# Patient Record
Sex: FEMALE | Race: WHITE | NOT HISPANIC OR LATINO | Employment: OTHER | URBAN - METROPOLITAN AREA
[De-identification: names, ages, dates, MRNs, and addresses within clinical notes are randomized per-mention and may not be internally consistent; named-entity substitution may affect disease eponyms.]

---

## 2020-10-16 ENCOUNTER — OFFICE VISIT (OUTPATIENT)
Dept: URGENT CARE | Facility: CLINIC | Age: 50
End: 2020-10-16
Payer: MEDICARE

## 2020-10-16 VITALS
RESPIRATION RATE: 20 BRPM | SYSTOLIC BLOOD PRESSURE: 138 MMHG | HEART RATE: 99 BPM | TEMPERATURE: 97 F | DIASTOLIC BLOOD PRESSURE: 90 MMHG | OXYGEN SATURATION: 97 %

## 2020-10-16 DIAGNOSIS — M54.50 CHRONIC BILATERAL LOW BACK PAIN WITHOUT SCIATICA: ICD-10-CM

## 2020-10-16 DIAGNOSIS — G89.29 CHRONIC BILATERAL LOW BACK PAIN WITHOUT SCIATICA: ICD-10-CM

## 2020-10-16 DIAGNOSIS — R30.0 DYSURIA: Primary | ICD-10-CM

## 2020-10-16 LAB
SL AMB  POCT GLUCOSE, UA: ABNORMAL
SL AMB LEUKOCYTE ESTERASE,UA: ABNORMAL
SL AMB POCT BILIRUBIN,UA: ABNORMAL
SL AMB POCT BLOOD,UA: ABNORMAL
SL AMB POCT CLARITY,UA: CLEAR
SL AMB POCT COLOR,UA: YELLOW
SL AMB POCT KETONES,UA: ABNORMAL
SL AMB POCT NITRITE,UA: ABNORMAL
SL AMB POCT PH,UA: 5
SL AMB POCT SPECIFIC GRAVITY,UA: 1.01
SL AMB POCT URINE PROTEIN: ABNORMAL
SL AMB POCT UROBILINOGEN: 1

## 2020-10-16 PROCEDURE — 87086 URINE CULTURE/COLONY COUNT: CPT | Performed by: PHYSICIAN ASSISTANT

## 2020-10-16 PROCEDURE — 99203 OFFICE O/P NEW LOW 30 MIN: CPT | Performed by: PHYSICIAN ASSISTANT

## 2020-10-16 PROCEDURE — G0463 HOSPITAL OUTPT CLINIC VISIT: HCPCS | Performed by: PHYSICIAN ASSISTANT

## 2020-10-16 PROCEDURE — 81002 URINALYSIS NONAUTO W/O SCOPE: CPT | Performed by: PHYSICIAN ASSISTANT

## 2020-10-16 RX ORDER — HALOPERIDOL 10 MG/1
10 TABLET ORAL 2 TIMES DAILY
COMMUNITY
End: 2021-01-18 | Stop reason: HOSPADM

## 2020-10-16 RX ORDER — OLANZAPINE 5 MG/1
20 TABLET ORAL
COMMUNITY
End: 2021-01-18 | Stop reason: HOSPADM

## 2020-10-16 RX ORDER — ACETAMINOPHEN 325 MG/1
650 TABLET ORAL EVERY 6 HOURS PRN
Qty: 30 TABLET | Refills: 0 | Status: SHIPPED | OUTPATIENT
Start: 2020-10-16 | End: 2021-11-23 | Stop reason: HOSPADM

## 2020-10-16 RX ORDER — ASPIRIN 81 MG/1
81 TABLET ORAL DAILY
Status: ON HOLD | COMMUNITY
End: 2021-01-18 | Stop reason: SDUPTHER

## 2020-10-16 RX ORDER — DIVALPROEX SODIUM 500 MG/1
500 TABLET, DELAYED RELEASE ORAL EVERY 12 HOURS SCHEDULED
Status: ON HOLD | COMMUNITY
End: 2021-01-18 | Stop reason: SDUPTHER

## 2020-10-16 RX ORDER — LISINOPRIL 20 MG/1
20 TABLET ORAL DAILY
COMMUNITY
End: 2021-01-18 | Stop reason: HOSPADM

## 2020-10-16 RX ORDER — OMEPRAZOLE 20 MG/1
20 CAPSULE, DELAYED RELEASE ORAL DAILY
COMMUNITY
End: 2021-01-07

## 2020-10-16 RX ORDER — FLUOXETINE HYDROCHLORIDE 40 MG/1
40 CAPSULE ORAL DAILY
COMMUNITY
End: 2021-01-18 | Stop reason: HOSPADM

## 2020-10-16 RX ORDER — NITROFURANTOIN 25; 75 MG/1; MG/1
100 CAPSULE ORAL 2 TIMES DAILY
Qty: 10 CAPSULE | Refills: 0 | Status: SHIPPED | OUTPATIENT
Start: 2020-10-16 | End: 2020-10-21

## 2020-10-16 RX ORDER — PALIPERIDONE PALMITATE 156 MG/ML
156 INJECTION INTRAMUSCULAR
COMMUNITY
End: 2021-01-07

## 2020-10-16 RX ORDER — ATORVASTATIN CALCIUM 40 MG/1
40 TABLET, FILM COATED ORAL DAILY
COMMUNITY
End: 2021-10-15 | Stop reason: HOSPADM

## 2020-10-17 LAB — BACTERIA UR CULT: NORMAL

## 2020-10-25 ENCOUNTER — HOSPITAL ENCOUNTER (EMERGENCY)
Facility: HOSPITAL | Age: 50
End: 2020-10-27
Attending: EMERGENCY MEDICINE | Admitting: EMERGENCY MEDICINE
Payer: MEDICARE

## 2020-10-25 DIAGNOSIS — R45.851 SUICIDAL IDEATION: Primary | ICD-10-CM

## 2020-10-25 LAB
AMPHETAMINES SERPL QL SCN: NEGATIVE
BARBITURATES UR QL: NEGATIVE
BENZODIAZ UR QL: NEGATIVE
COCAINE UR QL: NEGATIVE
ETHANOL EXG-MCNC: 0 MG/DL
ETHANOL EXG-MCNC: 0 MG/DL
EXT PREG TEST URINE: NEGATIVE
EXT. CONTROL ED NAV: NORMAL
METHADONE UR QL: NEGATIVE
OPIATES UR QL SCN: NEGATIVE
OXYCODONE+OXYMORPHONE UR QL SCN: NEGATIVE
PCP UR QL: NEGATIVE
SARS-COV-2 RNA RESP QL NAA+PROBE: NEGATIVE
THC UR QL: NEGATIVE

## 2020-10-25 PROCEDURE — 99284 EMERGENCY DEPT VISIT MOD MDM: CPT | Performed by: EMERGENCY MEDICINE

## 2020-10-25 PROCEDURE — 99285 EMERGENCY DEPT VISIT HI MDM: CPT

## 2020-10-25 PROCEDURE — 80307 DRUG TEST PRSMV CHEM ANLYZR: CPT | Performed by: EMERGENCY MEDICINE

## 2020-10-25 PROCEDURE — 82075 ASSAY OF BREATH ETHANOL: CPT

## 2020-10-25 PROCEDURE — 81025 URINE PREGNANCY TEST: CPT | Performed by: EMERGENCY MEDICINE

## 2020-10-25 PROCEDURE — 82075 ASSAY OF BREATH ETHANOL: CPT | Performed by: EMERGENCY MEDICINE

## 2020-10-25 PROCEDURE — 87635 SARS-COV-2 COVID-19 AMP PRB: CPT | Performed by: EMERGENCY MEDICINE

## 2020-10-26 PROCEDURE — 99204 OFFICE O/P NEW MOD 45 MIN: CPT | Performed by: PHYSICIAN ASSISTANT

## 2020-10-26 RX ORDER — ATORVASTATIN CALCIUM 40 MG/1
40 TABLET, FILM COATED ORAL
Status: DISCONTINUED | OUTPATIENT
Start: 2020-10-26 | End: 2020-10-27 | Stop reason: HOSPADM

## 2020-10-26 RX ORDER — ASPIRIN 81 MG/1
81 TABLET ORAL DAILY
Status: DISCONTINUED | OUTPATIENT
Start: 2020-10-26 | End: 2020-10-27 | Stop reason: HOSPADM

## 2020-10-26 RX ORDER — LISINOPRIL 5 MG/1
20 TABLET ORAL DAILY
Status: DISCONTINUED | OUTPATIENT
Start: 2020-10-26 | End: 2020-10-27 | Stop reason: HOSPADM

## 2020-10-26 RX ORDER — FLUOXETINE 10 MG/1
10 CAPSULE ORAL EVERY MORNING
Status: DISCONTINUED | OUTPATIENT
Start: 2020-10-26 | End: 2020-10-27 | Stop reason: HOSPADM

## 2020-10-26 RX ORDER — OLANZAPINE 5 MG/1
5 TABLET ORAL
Status: DISCONTINUED | OUTPATIENT
Start: 2020-10-26 | End: 2020-10-27 | Stop reason: HOSPADM

## 2020-10-26 RX ORDER — DIVALPROEX SODIUM 250 MG/1
500 TABLET, DELAYED RELEASE ORAL EVERY 12 HOURS SCHEDULED
Status: DISCONTINUED | OUTPATIENT
Start: 2020-10-26 | End: 2020-10-27 | Stop reason: HOSPADM

## 2020-10-26 RX ORDER — LORAZEPAM 1 MG/1
1 TABLET ORAL ONCE
Status: COMPLETED | OUTPATIENT
Start: 2020-10-26 | End: 2020-10-26

## 2020-10-26 RX ORDER — FLUOXETINE 10 MG/1
20 CAPSULE ORAL
Status: DISCONTINUED | OUTPATIENT
Start: 2020-10-26 | End: 2020-10-27 | Stop reason: HOSPADM

## 2020-10-26 RX ORDER — PANTOPRAZOLE SODIUM 20 MG/1
20 TABLET, DELAYED RELEASE ORAL
Status: DISCONTINUED | OUTPATIENT
Start: 2020-10-26 | End: 2020-10-27 | Stop reason: HOSPADM

## 2020-10-26 RX ORDER — HALOPERIDOL 10 MG/1
10 TABLET ORAL 2 TIMES DAILY
Status: DISCONTINUED | OUTPATIENT
Start: 2020-10-26 | End: 2020-10-27 | Stop reason: HOSPADM

## 2020-10-26 RX ADMIN — LISINOPRIL 20 MG: 5 TABLET ORAL at 09:05

## 2020-10-26 RX ADMIN — ATORVASTATIN CALCIUM 40 MG: 40 TABLET, FILM COATED ORAL at 17:38

## 2020-10-26 RX ADMIN — PANTOPRAZOLE SODIUM 20 MG: 20 TABLET, DELAYED RELEASE ORAL at 05:18

## 2020-10-26 RX ADMIN — OLANZAPINE 5 MG: 2.5 TABLET, FILM COATED ORAL at 02:05

## 2020-10-26 RX ADMIN — FLUOXETINE 20 MG: 10 CAPSULE ORAL at 17:37

## 2020-10-26 RX ADMIN — HALOPERIDOL 10 MG: 10 TABLET ORAL at 09:05

## 2020-10-26 RX ADMIN — ASPIRIN 81 MG: 81 TABLET, COATED ORAL at 09:05

## 2020-10-26 RX ADMIN — DIVALPROEX SODIUM 500 MG: 250 TABLET, DELAYED RELEASE ORAL at 02:06

## 2020-10-26 RX ADMIN — FLUOXETINE 10 MG: 10 CAPSULE ORAL at 09:05

## 2020-10-26 RX ADMIN — LORAZEPAM 1 MG: 1 TABLET ORAL at 14:04

## 2020-10-26 RX ADMIN — HALOPERIDOL 10 MG: 10 TABLET ORAL at 17:38

## 2020-10-26 RX ADMIN — DIVALPROEX SODIUM 500 MG: 250 TABLET, DELAYED RELEASE ORAL at 13:40

## 2020-10-27 VITALS
SYSTOLIC BLOOD PRESSURE: 129 MMHG | WEIGHT: 275 LBS | OXYGEN SATURATION: 99 % | HEIGHT: 64 IN | TEMPERATURE: 97.6 F | DIASTOLIC BLOOD PRESSURE: 70 MMHG | RESPIRATION RATE: 18 BRPM | BODY MASS INDEX: 46.95 KG/M2 | HEART RATE: 86 BPM

## 2020-10-27 RX ADMIN — ASPIRIN 81 MG: 81 TABLET, COATED ORAL at 08:46

## 2020-10-27 RX ADMIN — DIVALPROEX SODIUM 500 MG: 250 TABLET, DELAYED RELEASE ORAL at 02:16

## 2020-10-27 RX ADMIN — HALOPERIDOL 10 MG: 10 TABLET ORAL at 09:00

## 2020-10-27 RX ADMIN — OLANZAPINE 5 MG: 2.5 TABLET, FILM COATED ORAL at 00:53

## 2020-10-27 RX ADMIN — PANTOPRAZOLE SODIUM 20 MG: 20 TABLET, DELAYED RELEASE ORAL at 08:03

## 2020-10-27 RX ADMIN — LISINOPRIL 20 MG: 5 TABLET ORAL at 08:46

## 2020-10-27 RX ADMIN — FLUOXETINE 10 MG: 10 CAPSULE ORAL at 08:46

## 2020-11-28 ENCOUNTER — HOSPITAL ENCOUNTER (EMERGENCY)
Facility: HOSPITAL | Age: 50
End: 2020-11-29
Attending: EMERGENCY MEDICINE
Payer: MEDICARE

## 2020-11-28 DIAGNOSIS — R45.851 SUICIDAL IDEATIONS: ICD-10-CM

## 2020-11-28 DIAGNOSIS — F32.A DEPRESSION: Primary | ICD-10-CM

## 2020-11-28 LAB
AMPHETAMINES SERPL QL SCN: NEGATIVE
BARBITURATES UR QL: NEGATIVE
BENZODIAZ UR QL: NEGATIVE
COCAINE UR QL: NEGATIVE
ETHANOL EXG-MCNC: 0 MG/DL
EXT PREG TEST URINE: NEGATIVE
EXT. CONTROL ED NAV: NORMAL
METHADONE UR QL: NEGATIVE
OPIATES UR QL SCN: NEGATIVE
OXYCODONE+OXYMORPHONE UR QL SCN: NEGATIVE
PCP UR QL: NEGATIVE
SARS-COV-2 RNA SPEC QL NAA+PROBE: NEGATIVE
THC UR QL: NEGATIVE

## 2020-11-28 PROCEDURE — 99285 EMERGENCY DEPT VISIT HI MDM: CPT | Performed by: EMERGENCY MEDICINE

## 2020-11-28 PROCEDURE — 80307 DRUG TEST PRSMV CHEM ANLYZR: CPT | Performed by: EMERGENCY MEDICINE

## 2020-11-28 PROCEDURE — 99285 EMERGENCY DEPT VISIT HI MDM: CPT

## 2020-11-28 PROCEDURE — 81025 URINE PREGNANCY TEST: CPT | Performed by: EMERGENCY MEDICINE

## 2020-11-28 PROCEDURE — 82075 ASSAY OF BREATH ETHANOL: CPT | Performed by: EMERGENCY MEDICINE

## 2020-11-28 PROCEDURE — U0003 INFECTIOUS AGENT DETECTION BY NUCLEIC ACID (DNA OR RNA); SEVERE ACUTE RESPIRATORY SYNDROME CORONAVIRUS 2 (SARS-COV-2) (CORONAVIRUS DISEASE [COVID-19]), AMPLIFIED PROBE TECHNIQUE, MAKING USE OF HIGH THROUGHPUT TECHNOLOGIES AS DESCRIBED BY CMS-2020-01-R: HCPCS | Performed by: EMERGENCY MEDICINE

## 2020-11-28 RX ORDER — FLUOXETINE 10 MG/1
10 CAPSULE ORAL
Status: DISCONTINUED | OUTPATIENT
Start: 2020-11-29 | End: 2020-11-29 | Stop reason: HOSPADM

## 2020-11-28 RX ORDER — PANTOPRAZOLE SODIUM 20 MG/1
20 TABLET, DELAYED RELEASE ORAL DAILY
Status: DISCONTINUED | OUTPATIENT
Start: 2020-11-29 | End: 2020-11-29 | Stop reason: HOSPADM

## 2020-11-28 RX ORDER — FLUOXETINE 10 MG/1
20 CAPSULE ORAL
Status: DISCONTINUED | OUTPATIENT
Start: 2020-11-28 | End: 2020-11-29 | Stop reason: HOSPADM

## 2020-11-28 RX ORDER — ATORVASTATIN CALCIUM 40 MG/1
40 TABLET, FILM COATED ORAL
Status: DISCONTINUED | OUTPATIENT
Start: 2020-11-28 | End: 2020-11-29 | Stop reason: HOSPADM

## 2020-11-28 RX ORDER — OLANZAPINE 2.5 MG/1
5 TABLET ORAL
Status: DISCONTINUED | OUTPATIENT
Start: 2020-11-28 | End: 2020-11-29 | Stop reason: HOSPADM

## 2020-11-28 RX ORDER — HALOPERIDOL 10 MG/1
10 TABLET ORAL 2 TIMES DAILY
Status: DISCONTINUED | OUTPATIENT
Start: 2020-11-28 | End: 2020-11-29 | Stop reason: HOSPADM

## 2020-11-28 RX ORDER — VALPROIC ACID 250 MG/1
500 CAPSULE, LIQUID FILLED ORAL 2 TIMES DAILY
Status: DISCONTINUED | OUTPATIENT
Start: 2020-11-28 | End: 2020-11-29 | Stop reason: HOSPADM

## 2020-11-28 RX ORDER — ASPIRIN 81 MG/1
81 TABLET, CHEWABLE ORAL DAILY
Status: DISCONTINUED | OUTPATIENT
Start: 2020-11-29 | End: 2020-11-29 | Stop reason: HOSPADM

## 2020-11-28 RX ORDER — LISINOPRIL 5 MG/1
20 TABLET ORAL DAILY
Status: DISCONTINUED | OUTPATIENT
Start: 2020-11-29 | End: 2020-11-29 | Stop reason: HOSPADM

## 2020-11-28 RX ADMIN — HALOPERIDOL 10 MG: 10 TABLET ORAL at 18:47

## 2020-11-28 RX ADMIN — OLANZAPINE 5 MG: 2.5 TABLET, FILM COATED ORAL at 22:17

## 2020-11-28 RX ADMIN — FLUOXETINE 20 MG: 10 CAPSULE ORAL at 22:17

## 2020-11-28 RX ADMIN — VALPROIC ACID 500 MG: 250 CAPSULE, LIQUID FILLED ORAL at 22:17

## 2020-11-28 RX ADMIN — ATORVASTATIN CALCIUM 40 MG: 40 TABLET, FILM COATED ORAL at 18:47

## 2020-11-29 VITALS
HEART RATE: 71 BPM | OXYGEN SATURATION: 97 % | TEMPERATURE: 98.1 F | SYSTOLIC BLOOD PRESSURE: 120 MMHG | DIASTOLIC BLOOD PRESSURE: 63 MMHG | BODY MASS INDEX: 46.95 KG/M2 | RESPIRATION RATE: 17 BRPM | HEIGHT: 64 IN | WEIGHT: 275 LBS

## 2021-01-06 ENCOUNTER — HOSPITAL ENCOUNTER (EMERGENCY)
Facility: HOSPITAL | Age: 51
DRG: 885 | End: 2021-01-08
Attending: EMERGENCY MEDICINE | Admitting: EMERGENCY MEDICINE
Payer: MEDICARE

## 2021-01-06 DIAGNOSIS — F39 MOOD DISORDER (HCC): Primary | ICD-10-CM

## 2021-01-06 DIAGNOSIS — R45.851 SUICIDAL IDEATION: ICD-10-CM

## 2021-01-06 LAB
ETHANOL EXG-MCNC: 0 MG/DL
EXT PREG TEST URINE: NEGATIVE
EXT. CONTROL ED NAV: NORMAL
FLUAV RNA RESP QL NAA+PROBE: NEGATIVE
FLUBV RNA RESP QL NAA+PROBE: NEGATIVE
RSV RNA RESP QL NAA+PROBE: NEGATIVE
SARS-COV-2 RNA RESP QL NAA+PROBE: NEGATIVE

## 2021-01-06 PROCEDURE — 0241U HB NFCT DS VIR RESP RNA 4 TRGT: CPT | Performed by: EMERGENCY MEDICINE

## 2021-01-06 PROCEDURE — 80307 DRUG TEST PRSMV CHEM ANLYZR: CPT | Performed by: EMERGENCY MEDICINE

## 2021-01-06 PROCEDURE — 99285 EMERGENCY DEPT VISIT HI MDM: CPT

## 2021-01-06 PROCEDURE — 99285 EMERGENCY DEPT VISIT HI MDM: CPT | Performed by: EMERGENCY MEDICINE

## 2021-01-06 PROCEDURE — 82075 ASSAY OF BREATH ETHANOL: CPT | Performed by: EMERGENCY MEDICINE

## 2021-01-06 PROCEDURE — 81025 URINE PREGNANCY TEST: CPT | Performed by: EMERGENCY MEDICINE

## 2021-01-06 NOTE — Clinical Note
Sue Francisco should be transferred out to [address] [place] [address] and has been medically cleared

## 2021-01-07 LAB
AMPHETAMINES SERPL QL SCN: NEGATIVE
BARBITURATES UR QL: NEGATIVE
BENZODIAZ UR QL: NEGATIVE
COCAINE UR QL: NEGATIVE
METHADONE UR QL: NEGATIVE
OPIATES UR QL SCN: NEGATIVE
OXYCODONE+OXYMORPHONE UR QL SCN: NEGATIVE
PCP UR QL: NEGATIVE
THC UR QL: NEGATIVE

## 2021-01-07 RX ORDER — LISINOPRIL 5 MG/1
20 TABLET ORAL ONCE
Status: COMPLETED | OUTPATIENT
Start: 2021-01-07 | End: 2021-01-07

## 2021-01-07 RX ORDER — ATORVASTATIN CALCIUM 40 MG/1
40 TABLET, FILM COATED ORAL
Status: DISCONTINUED | OUTPATIENT
Start: 2021-01-07 | End: 2021-01-08 | Stop reason: HOSPADM

## 2021-01-07 RX ORDER — FLUOXETINE 10 MG/1
40 CAPSULE ORAL DAILY
Status: DISCONTINUED | OUTPATIENT
Start: 2021-01-07 | End: 2021-01-08 | Stop reason: HOSPADM

## 2021-01-07 RX ORDER — OLANZAPINE 2.5 MG/1
5 TABLET ORAL
Status: DISCONTINUED | OUTPATIENT
Start: 2021-01-07 | End: 2021-01-08 | Stop reason: HOSPADM

## 2021-01-07 RX ORDER — DIVALPROEX SODIUM 250 MG/1
500 TABLET, DELAYED RELEASE ORAL EVERY 12 HOURS SCHEDULED
Status: DISCONTINUED | OUTPATIENT
Start: 2021-01-07 | End: 2021-01-08 | Stop reason: HOSPADM

## 2021-01-07 RX ORDER — PRAZOSIN HYDROCHLORIDE 1 MG/1
1 CAPSULE ORAL
Status: ON HOLD | COMMUNITY
End: 2021-01-18 | Stop reason: SDUPTHER

## 2021-01-07 RX ORDER — PANTOPRAZOLE SODIUM 20 MG/1
20 TABLET, DELAYED RELEASE ORAL DAILY
COMMUNITY
End: 2021-02-20

## 2021-01-07 RX ADMIN — OLANZAPINE 5 MG: 2.5 TABLET, FILM COATED ORAL at 22:26

## 2021-01-07 RX ADMIN — DIVALPROEX SODIUM 500 MG: 250 TABLET, DELAYED RELEASE ORAL at 14:25

## 2021-01-07 RX ADMIN — DIVALPROEX SODIUM 500 MG: 250 TABLET, DELAYED RELEASE ORAL at 22:26

## 2021-01-07 RX ADMIN — FLUOXETINE 40 MG: 10 CAPSULE ORAL at 14:25

## 2021-01-07 RX ADMIN — ATORVASTATIN CALCIUM 40 MG: 40 TABLET, FILM COATED ORAL at 17:19

## 2021-01-07 RX ADMIN — LISINOPRIL 20 MG: 5 TABLET ORAL at 14:25

## 2021-01-07 NOTE — ED NOTES
Pt laying in bed, no signs of distress  Offered food/drink, bathroom and/or readjustment in bed  Pt denied        Yodit Khan, RN  01/07/21 0020

## 2021-01-07 NOTE — ED NOTES
Patient presents to the emergency department with suicidal ideation with plan  Patient states she has been having worsening depression over the the few days, feels it is caused by several factors  COVID restrictions, boyfriend living far away, and not being able to see other important people to her  Patient also expressed auditory hallucinations starting over the last few days, telling her she is worthless as well as command hallucinations telling her to kill herself  Patient expressed feelings of hopelessness  Patient has had multiple past suicide attempts, most recent she believes was last spring via overdose  Patient denies homicidal ideation as well as visual hallucinations  Patient is compliant with outpatient treatment and medications  Patient is willing to sign a 201

## 2021-01-07 NOTE — ED CARE HANDOFF
Emergency Department Sign Out Note        Sign out and transfer of care from Dr Kamran Cm  See Separate Emergency Department note  The patient, Benny Varghese, was evaluated by the previous provider for suicidal ideation with command hallucinations  Workup Completed:  Patient medically cleared  Crisis consulted  201 signed  Placement pending  ED Course / Workup Pending (followup): Patient monitored in ED with no acute events                                     Procedures  MDM    Disposition  Final diagnoses:   None     ED Disposition     None      MD Documentation      Most Recent Value   Sending MD  Dr Alvarez Chaney    None       Patient's Medications   Discharge Prescriptions    No medications on file     No discharge procedures on file         ED Provider  Electronically Signed by     Boby Bhakta MD  01/07/21 5297

## 2021-01-07 NOTE — ED NOTES
Pt laying in bed  Offered bathroom, food/drink, readjustment of bed and/or blanket for pt  Pt denied  Pt continues to lay in bed with even rise/fall of chest and no signs of distress        Vivian Donovan RN  01/07/21 0021

## 2021-01-07 NOTE — ED PROVIDER NOTES
History  Chief Complaint   Patient presents with    Suicidal     Patient states the voices in her head are telling her to cut her throat and wrists      67-year-old female with prior psychiatric history presents for evaluation of suicidal ideations that started yesterday  Patient reports audio hallucinations with her own thoughts telling her to slit her wrists or slit her throat  Patient states she has been compliant with her medications  Denies drug abuse  No further complaints at this time  Prior to Admission Medications   Prescriptions Last Dose Informant Patient Reported? Taking? FLUoxetine (PROzac) 40 MG capsule   Yes No   Sig: Take 40 mg by mouth daily Take one 40 mg capsule by mouth every morning for depression   OLANZapine (ZyPREXA) 5 mg tablet  Care Giver Yes No   Sig: Take 20 mg by mouth daily at bedtime    acetaminophen (TYLENOL) 325 mg tablet   No No   Sig: Take 2 tablets (650 mg total) by mouth every 6 (six) hours as needed for mild pain   aspirin (ECOTRIN LOW STRENGTH) 81 mg EC tablet   Yes No   Sig: Take 81 mg by mouth daily   atorvastatin (LIPITOR) 40 mg tablet   Yes No   Sig: Take 40 mg by mouth daily   divalproex sodium (DEPAKOTE) 500 mg EC tablet   Yes No   Sig: Take 500 mg by mouth every 12 (twelve) hours   haloperidol (HALDOL) 10 mg tablet   Yes No   Sig: Take 10 mg by mouth 2 (two) times a day   lisinopril (ZESTRIL) 20 mg tablet   Yes No   Sig: Take 20 mg by mouth daily   pantoprazole (PROTONIX) 20 mg tablet  Care Giver Yes Yes   Sig: Take 20 mg by mouth daily Take one capsule every morning before breakfast for heartburn   prazosin (MINIPRESS) 1 mg capsule  Care Giver Yes Yes   Sig: Take 1 mg by mouth daily at bedtime Take one capsule by mouth at bedtime for dreams / sleep disturbances        Facility-Administered Medications: None       Past Medical History:   Diagnosis Date    Bipolar 1 disorder ([facility] [address] )     Hypertension     Psychiatric disorder     PTSD (post-traumatic stress disorder)        Past Surgical History:   Procedure Laterality Date    APPENDECTOMY      CHOLECYSTECTOMY      FOOT SURGERY Right        History reviewed  No pertinent family history  I have reviewed and agree with the history as documented  E-Cigarette/Vaping     E-Cigarette/Vaping Substances     Social History     Tobacco Use    Smoking status: Former Smoker    Smokeless tobacco: Never Used   Substance Use Topics    Alcohol use: Not Currently    Drug use: Not Currently       Review of Systems   Constitutional: Negative for chills, diaphoresis and fever  HENT: Negative for congestion and rhinorrhea  Eyes: Negative for pain and visual disturbance  Respiratory: Negative for cough, shortness of breath and wheezing  Cardiovascular: Negative for chest pain and leg swelling  Gastrointestinal: Negative for abdominal pain, diarrhea, nausea and vomiting  Genitourinary: Negative for difficulty urinating, dysuria, frequency and urgency  Musculoskeletal: Negative for back pain and neck pain  Skin: Negative for color change and rash  Neurological: Negative for syncope, numbness and headaches  Psychiatric/Behavioral: Positive for dysphoric mood, hallucinations and suicidal ideas  All other systems reviewed and are negative  Physical Exam  Physical Exam  Vitals signs and nursing note reviewed  Constitutional:       Appearance: She is well-developed  Comments: Calm, cooperative   HENT:      Head: Normocephalic and atraumatic  Eyes:      Conjunctiva/sclera: Conjunctivae normal    Neck:      Musculoskeletal: Normal range of motion and neck supple  Cardiovascular:      Rate and Rhythm: Normal rate and regular rhythm  Pulmonary:      Effort: Pulmonary effort is normal  No respiratory distress  Abdominal:      Palpations: Abdomen is soft  Tenderness: There is no abdominal tenderness  Musculoskeletal: Normal range of motion  General: No tenderness     Skin:     General: Skin is warm  Findings: No erythema  Neurological:      Mental Status: She is alert and oriented to person, place, and time  Psychiatric:         Behavior: Behavior normal          Vital Signs  ED Triage Vitals [01/06/21 2047]   Temperature Pulse Respirations Blood Pressure SpO2   97 9 °F (36 6 °C) 100 20 136/77 95 %      Temp Source Heart Rate Source Patient Position - Orthostatic VS BP Location FiO2 (%)   Tympanic Monitor Sitting Left arm --      Pain Score       --           Vitals:    01/06/21 2047 01/07/21 1253 01/07/21 1910   BP: 136/77 124/73 101/59   Pulse: 100 86 76   Patient Position - Orthostatic VS: Sitting Sitting Lying         Visual Acuity      ED Medications  Medications   atorvastatin (LIPITOR) tablet 40 mg (40 mg Oral Given 1/7/21 1719)   divalproex sodium (DEPAKOTE) EC tablet 500 mg (500 mg Oral Given 1/7/21 2226)   FLUoxetine (PROzac) capsule 40 mg (40 mg Oral Given 1/7/21 1425)   OLANZapine (ZyPREXA) tablet 5 mg (5 mg Oral Given 1/7/21 2226)   lisinopril (ZESTRIL) tablet 20 mg (20 mg Oral Given 1/7/21 1425)       Diagnostic Studies  Results Reviewed     Procedure Component Value Units Date/Time    Rapid drug screen, urine [[id number]]  (Normal) Collected: 01/06/21 2335    Lab Status: Final result Specimen: Urine, Clean Catch Updated: 01/07/21 0012     Amph/Meth UR Negative     Barbiturate Ur Negative     Benzodiazepine Urine Negative     Cocaine Urine Negative     Methadone Urine Negative     Opiate Urine Negative     PCP Ur Negative     THC Urine Negative     Oxycodone Urine Negative    Narrative:      FOR MEDICAL PURPOSES ONLY  IF CONFIRMATION NEEDED PLEASE CONTACT THE LAB WITHIN 5 DAYS      Drug Screen Cutoff Levels:  AMPHETAMINE/METHAMPHETAMINES  1000 ng/mL  BARBITURATES     200 ng/mL  BENZODIAZEPINES     200 ng/mL  COCAINE      300 ng/mL  METHADONE      300 ng/mL  OPIATES      300 ng/mL  PHENCYCLIDINE     25 ng/mL  THC       50 ng/mL  OXYCODONE      100 ng/mL    POCT pregnancy, urine [[id number]]  (Normal) Resulted: 01/06/21 2335    Lab Status: Final result Updated: 01/06/21 2335     EXT PREG TEST UR (Ref: Negative) negative     Control valid    COVID19, Influenza A/B, RSV PCR, [facility] [[id number]]  (Normal) Collected: 01/06/21 2052    Lab Status: Final result Specimen: Nasopharyngeal Swab Updated: 01/06/21 2151     SARS-CoV-2 Negative     INFLUENZA A PCR Negative     INFLUENZA B PCR Negative     RSV PCR Negative    Narrative: This test has been authorized by FDA under an EUA (Emergency Use Assay) for use by authorized laboratories  Clinical caution and judgement should be used with the interpretation of these results with consideration of the clinical impression and other laboratory testing  Testing reported as "Positive" or "Negative" has been proven to be accurate according to standard laboratory validation requirements  All testing is performed with control materials showing appropriate reactivity at standard intervals  POCT alcohol breath test [[id number]]  (Normal) Resulted: 01/06/21 2049    Lab Status: Final result Updated: 01/06/21 2049     EXTBreath Alcohol 0 00                 No orders to display              Procedures  Procedures         ED Course                             SBIRT 20yo+      Most Recent Value   SBIRT (25 yo +)   In order to provide better care to our patients, we are screening all of our patients for alcohol and drug use  Would it be okay to ask you these screening questions? Yes Filed at: 01/06/2021 2105   Initial Alcohol Screen: US AUDIT-C    1  How often do you have a drink containing alcohol?  0 Filed at: 01/06/2021 2105   2  How many drinks containing alcohol do you have on a typical day you are drinking? 0 Filed at: 01/06/2021 2105   3b  FEMALE Any Age, or MALE 65+: How often do you have 4 or more drinks on one occassion?   0 Filed at: 01/06/2021 2105   Audit-C Score  0 Filed at: 01/06/2021 2105   DAT: How many times in the past year have you  Used an illegal drug or used a prescription medication for non-medical reasons? Never Filed at: 01/06/2021 2105                    MDM  Number of Diagnoses or Management Options  Diagnosis management comments: 25-year-old female with suicidal ideations and audio hallucinations  Would like to sign a 201  Obtain drug screen, alcohol, COVID and crisis consultation  Disposition  Final diagnoses:   None     ED Disposition     None      MD Documentation      Most Recent Value   Sending MD Dr Nova Nolan    None         Patient's Medications   Discharge Prescriptions    No medications on file     No discharge procedures on file      PDMP Review     None          ED Provider  Electronically Signed by           Valentina Mayers DO  01/08/21 0274

## 2021-01-07 NOTE — ED NOTES
Bed Search    [facility]- no beds currently, suggested returning call in the morning  [facility]- Per Marcelina Wallace, patient has exhausted therapeutic benefits   [facility]- no appropriate beds  [place]- no beds at this time  [facility]- no beds at this time, please check back in the morning  [facility]- no beds  [facility]- no beds  [facility]- no beds        Bed search exhausted at this time to continue next shift

## 2021-01-07 NOTE — ED NOTES
Called [facility] earlier this shift to verify they received faxed referral  Was told their faxes sometimes took an hour or more to reach their admission department, so I should try back later to ask again  Called again and was told they never received it  Re-faxed referral to [facility]

## 2021-01-07 NOTE — ED NOTES
Pt sitting up in bed  Pt states she continues to hear voices in her head telling her to cut her wrists  Pt denies any physical distress nor demonstrates it  Pt continues to lay in bed, awaiting crisis        Maykel Muñiz RN  01/07/21 9372

## 2021-01-07 NOTE — ED NOTES
Pt's caregiver provided RN with a list of pt's current medications  Pt's home medications updated in Epic  Caregiver also provided [facility], Michelle Osullivan,  phone number of [phone]         Kelly Tse RN  01/07/21 7423

## 2021-01-07 NOTE — ED NOTES
Information faxed to [facility] at [facility] for review and possible admission

## 2021-01-08 ENCOUNTER — HOSPITAL ENCOUNTER (INPATIENT)
Facility: HOSPITAL | Age: 51
LOS: 10 days | Discharge: HOME | DRG: 885 | End: 2021-01-18
Attending: STUDENT IN AN ORGANIZED HEALTH CARE EDUCATION/TRAINING PROGRAM | Admitting: STUDENT IN AN ORGANIZED HEALTH CARE EDUCATION/TRAINING PROGRAM
Payer: MEDICARE

## 2021-01-08 VITALS
DIASTOLIC BLOOD PRESSURE: 60 MMHG | SYSTOLIC BLOOD PRESSURE: 118 MMHG | TEMPERATURE: 97.6 F | HEART RATE: 88 BPM | OXYGEN SATURATION: 93 % | RESPIRATION RATE: 20 BRPM

## 2021-01-08 DIAGNOSIS — F43.10 PTSD (POST-TRAUMATIC STRESS DISORDER): ICD-10-CM

## 2021-01-08 DIAGNOSIS — Z00.8 MEDICAL CLEARANCE FOR PSYCHIATRIC ADMISSION: Primary | ICD-10-CM

## 2021-01-08 DIAGNOSIS — Z79.82 ENCOUNTER FOR LONG-TERM (CURRENT) ASPIRIN USE: ICD-10-CM

## 2021-01-08 DIAGNOSIS — F31.9 BIPOLAR DISORDER (HCC): ICD-10-CM

## 2021-01-08 DIAGNOSIS — F39 MOOD DISORDER (HCC): ICD-10-CM

## 2021-01-08 RX ORDER — ACETAMINOPHEN 325 MG/1
325 TABLET ORAL EVERY 6 HOURS PRN
Status: CANCELLED | OUTPATIENT
Start: 2021-01-08

## 2021-01-08 RX ORDER — ACETAMINOPHEN 325 MG/1
325 TABLET ORAL EVERY 6 HOURS PRN
Status: DISCONTINUED | OUTPATIENT
Start: 2021-01-08 | End: 2021-01-18 | Stop reason: HOSPADM

## 2021-01-08 RX ORDER — FLUOXETINE 10 MG/1
40 CAPSULE ORAL DAILY
Status: CANCELLED | OUTPATIENT
Start: 2021-01-09

## 2021-01-08 RX ORDER — HALOPERIDOL 5 MG/ML
5 INJECTION INTRAMUSCULAR EVERY 6 HOURS PRN
Status: DISCONTINUED | OUTPATIENT
Start: 2021-01-08 | End: 2021-01-18 | Stop reason: HOSPADM

## 2021-01-08 RX ORDER — LORAZEPAM 2 MG/ML
2 INJECTION INTRAMUSCULAR EVERY 6 HOURS PRN
Status: DISCONTINUED | OUTPATIENT
Start: 2021-01-08 | End: 2021-01-18 | Stop reason: HOSPADM

## 2021-01-08 RX ORDER — FLUOXETINE HYDROCHLORIDE 20 MG/1
40 CAPSULE ORAL DAILY
Status: DISCONTINUED | OUTPATIENT
Start: 2021-01-09 | End: 2021-01-14

## 2021-01-08 RX ORDER — ACETAMINOPHEN 325 MG/1
650 TABLET ORAL EVERY 4 HOURS PRN
Status: DISCONTINUED | OUTPATIENT
Start: 2021-01-08 | End: 2021-01-18 | Stop reason: HOSPADM

## 2021-01-08 RX ORDER — BENZTROPINE MESYLATE 1 MG/ML
1 INJECTION INTRAMUSCULAR; INTRAVENOUS EVERY 6 HOURS PRN
Status: CANCELLED | OUTPATIENT
Start: 2021-01-08

## 2021-01-08 RX ORDER — BENZTROPINE MESYLATE 0.5 MG/1
1 TABLET ORAL EVERY 6 HOURS PRN
Status: CANCELLED | OUTPATIENT
Start: 2021-01-08

## 2021-01-08 RX ORDER — OLANZAPINE 10 MG/1
10 INJECTION, POWDER, LYOPHILIZED, FOR SOLUTION INTRAMUSCULAR EVERY 8 HOURS PRN
Status: CANCELLED | OUTPATIENT
Start: 2021-01-08

## 2021-01-08 RX ORDER — ATORVASTATIN CALCIUM 40 MG/1
40 TABLET, FILM COATED ORAL
Status: CANCELLED | OUTPATIENT
Start: 2021-01-08

## 2021-01-08 RX ORDER — LORAZEPAM 1 MG/1
1 TABLET ORAL EVERY 6 HOURS PRN
Status: CANCELLED | OUTPATIENT
Start: 2021-01-08

## 2021-01-08 RX ORDER — MAGNESIUM HYDROXIDE/ALUMINUM HYDROXICE/SIMETHICONE 120; 1200; 1200 MG/30ML; MG/30ML; MG/30ML
30 SUSPENSION ORAL EVERY 4 HOURS PRN
Status: DISCONTINUED | OUTPATIENT
Start: 2021-01-08 | End: 2021-01-18 | Stop reason: HOSPADM

## 2021-01-08 RX ORDER — BENZTROPINE MESYLATE 1 MG/ML
1 INJECTION INTRAMUSCULAR; INTRAVENOUS EVERY 6 HOURS PRN
Status: DISCONTINUED | OUTPATIENT
Start: 2021-01-08 | End: 2021-01-18 | Stop reason: HOSPADM

## 2021-01-08 RX ORDER — TRAZODONE HYDROCHLORIDE 50 MG/1
50 TABLET ORAL
Status: DISCONTINUED | OUTPATIENT
Start: 2021-01-08 | End: 2021-01-18 | Stop reason: HOSPADM

## 2021-01-08 RX ORDER — BENZTROPINE MESYLATE 1 MG/1
1 TABLET ORAL EVERY 6 HOURS PRN
Status: DISCONTINUED | OUTPATIENT
Start: 2021-01-08 | End: 2021-01-18 | Stop reason: HOSPADM

## 2021-01-08 RX ORDER — HYDROXYZINE 50 MG/1
50 TABLET, FILM COATED ORAL EVERY 6 HOURS PRN
Status: DISCONTINUED | OUTPATIENT
Start: 2021-01-08 | End: 2021-01-18 | Stop reason: HOSPADM

## 2021-01-08 RX ORDER — HALOPERIDOL 5 MG/ML
5 INJECTION INTRAMUSCULAR EVERY 6 HOURS PRN
Status: CANCELLED | OUTPATIENT
Start: 2021-01-08

## 2021-01-08 RX ORDER — HYDROXYZINE HYDROCHLORIDE 25 MG/1
25 TABLET, FILM COATED ORAL EVERY 6 HOURS PRN
Status: CANCELLED | OUTPATIENT
Start: 2021-01-08

## 2021-01-08 RX ORDER — OLANZAPINE 10 MG/1
10 TABLET ORAL EVERY 8 HOURS PRN
Status: DISCONTINUED | OUTPATIENT
Start: 2021-01-08 | End: 2021-01-18 | Stop reason: HOSPADM

## 2021-01-08 RX ORDER — LORAZEPAM 2 MG/ML
2 INJECTION INTRAMUSCULAR EVERY 6 HOURS PRN
Status: CANCELLED | OUTPATIENT
Start: 2021-01-08

## 2021-01-08 RX ORDER — LISINOPRIL 20 MG/1
20 TABLET ORAL ONCE
Status: COMPLETED | OUTPATIENT
Start: 2021-01-09 | End: 2021-01-09

## 2021-01-08 RX ORDER — TRAZODONE HYDROCHLORIDE 50 MG/1
50 TABLET ORAL
Status: CANCELLED | OUTPATIENT
Start: 2021-01-08

## 2021-01-08 RX ORDER — IBUPROFEN 600 MG/1
600 TABLET ORAL EVERY 6 HOURS PRN
Status: DISCONTINUED | OUTPATIENT
Start: 2021-01-08 | End: 2021-01-18 | Stop reason: HOSPADM

## 2021-01-08 RX ORDER — LORAZEPAM 1 MG/1
1 TABLET ORAL EVERY 6 HOURS PRN
Status: DISCONTINUED | OUTPATIENT
Start: 2021-01-08 | End: 2021-01-18 | Stop reason: HOSPADM

## 2021-01-08 RX ORDER — HYDROXYZINE HYDROCHLORIDE 25 MG/1
25 TABLET, FILM COATED ORAL EVERY 6 HOURS PRN
Status: DISCONTINUED | OUTPATIENT
Start: 2021-01-08 | End: 2021-01-18 | Stop reason: HOSPADM

## 2021-01-08 RX ORDER — MAGNESIUM HYDROXIDE/ALUMINUM HYDROXICE/SIMETHICONE 120; 1200; 1200 MG/30ML; MG/30ML; MG/30ML
30 SUSPENSION ORAL EVERY 4 HOURS PRN
Status: CANCELLED | OUTPATIENT
Start: 2021-01-08

## 2021-01-08 RX ORDER — HALOPERIDOL 5 MG
5 TABLET ORAL EVERY 6 HOURS PRN
Status: DISCONTINUED | OUTPATIENT
Start: 2021-01-08 | End: 2021-01-18 | Stop reason: HOSPADM

## 2021-01-08 RX ORDER — HYDROXYZINE HYDROCHLORIDE 25 MG/1
50 TABLET, FILM COATED ORAL EVERY 6 HOURS PRN
Status: CANCELLED | OUTPATIENT
Start: 2021-01-08

## 2021-01-08 RX ORDER — IBUPROFEN 600 MG/1
600 TABLET ORAL EVERY 6 HOURS PRN
Status: CANCELLED | OUTPATIENT
Start: 2021-01-08

## 2021-01-08 RX ORDER — ATORVASTATIN CALCIUM 40 MG/1
40 TABLET, FILM COATED ORAL
Status: DISCONTINUED | OUTPATIENT
Start: 2021-01-09 | End: 2021-01-18 | Stop reason: HOSPADM

## 2021-01-08 RX ORDER — RISPERIDONE 1 MG/1
1 TABLET, ORALLY DISINTEGRATING ORAL
Status: DISCONTINUED | OUTPATIENT
Start: 2021-01-08 | End: 2021-01-18 | Stop reason: HOSPADM

## 2021-01-08 RX ORDER — OLANZAPINE 5 MG/1
5 TABLET ORAL
Status: DISCONTINUED | OUTPATIENT
Start: 2021-01-08 | End: 2021-01-15

## 2021-01-08 RX ORDER — OLANZAPINE 2.5 MG/1
5 TABLET ORAL
Status: CANCELLED | OUTPATIENT
Start: 2021-01-08

## 2021-01-08 RX ORDER — OLANZAPINE 2.5 MG/1
10 TABLET ORAL EVERY 8 HOURS PRN
Status: CANCELLED | OUTPATIENT
Start: 2021-01-08

## 2021-01-08 RX ORDER — DIVALPROEX SODIUM 500 MG/1
500 TABLET, DELAYED RELEASE ORAL EVERY 12 HOURS SCHEDULED
Status: DISCONTINUED | OUTPATIENT
Start: 2021-01-08 | End: 2021-01-18 | Stop reason: HOSPADM

## 2021-01-08 RX ORDER — HALOPERIDOL 5 MG
5 TABLET ORAL EVERY 6 HOURS PRN
Status: CANCELLED | OUTPATIENT
Start: 2021-01-08

## 2021-01-08 RX ORDER — ACETAMINOPHEN 325 MG/1
650 TABLET ORAL EVERY 4 HOURS PRN
Status: CANCELLED | OUTPATIENT
Start: 2021-01-08

## 2021-01-08 RX ORDER — RISPERIDONE 1 MG/1
1 TABLET, ORALLY DISINTEGRATING ORAL
Status: CANCELLED | OUTPATIENT
Start: 2021-01-08

## 2021-01-08 RX ORDER — DIVALPROEX SODIUM 250 MG/1
500 TABLET, DELAYED RELEASE ORAL EVERY 12 HOURS SCHEDULED
Status: CANCELLED | OUTPATIENT
Start: 2021-01-08

## 2021-01-08 RX ORDER — LISINOPRIL 5 MG/1
20 TABLET ORAL ONCE
Status: CANCELLED | OUTPATIENT
Start: 2021-01-09

## 2021-01-08 RX ORDER — OLANZAPINE 10 MG/1
10 INJECTION, POWDER, LYOPHILIZED, FOR SOLUTION INTRAMUSCULAR EVERY 8 HOURS PRN
Status: DISCONTINUED | OUTPATIENT
Start: 2021-01-08 | End: 2021-01-18 | Stop reason: HOSPADM

## 2021-01-08 RX ADMIN — TRAZODONE HYDROCHLORIDE 50 MG: 50 TABLET ORAL at 21:52

## 2021-01-08 RX ADMIN — DIVALPROEX SODIUM 500 MG: 250 TABLET, DELAYED RELEASE ORAL at 08:57

## 2021-01-08 RX ADMIN — DIVALPROEX SODIUM 500 MG: 500 TABLET, DELAYED RELEASE ORAL at 21:51

## 2021-01-08 RX ADMIN — FLUOXETINE 40 MG: 10 CAPSULE ORAL at 08:57

## 2021-01-08 RX ADMIN — ATORVASTATIN CALCIUM 40 MG: 40 TABLET, FILM COATED ORAL at 15:49

## 2021-01-08 RX ADMIN — OLANZAPINE 5 MG: 5 TABLET, FILM COATED ORAL at 21:51

## 2021-01-08 NOTE — ED NOTES
Patient walked to and from the bathroom with no incidents       [place] [address]  01/07/21 2044       [place] [address]  01/07/21 2045

## 2021-01-08 NOTE — ED NOTES
Physician notified of need for psych consult  Awaiting further orders        Sue Dear, RN  01/08/21 1985

## 2021-01-08 NOTE — ED NOTES
Patient is accepted at [facility]  Patient is accepted by Bhargav Sanchez PA-C per Beau Mccarthy at intake     Transportation is at [address] via [address] report is to be called to [phone] prior to patient transfer

## 2021-01-08 NOTE — EMTALA/ACUTE CARE TRANSFER
[facility] EMERGENCY DEPARTMENT  [address] [address]  Dept: [phone]      EMTALA TRANSFER CONSENT    NAME Leydi Pugh                                         1970                              MRN [id number]    I have been informed of my rights regarding examination, treatment, and transfer   by Dr Gretta Whitlock att  providers found    Benefits:      Risks:        Consent for Transfer:  I acknowledge that my medical condition has been evaluated and explained to me by the emergency department physician or other qualified medical person and/or my attending physician, who has recommended that I be transferred to the service of  Accepting Physician: Dr Joana Lane at [address] Name, [address] : [place]  The above potential benefits of such transfer, the potential risks associated with such transfer, and the probable risks of not being transferred have been explained to me, and I fully understand them  The doctor has explained that, in my case, the benefits of transfer outweigh the risks  I agree to be transferred  I authorize the performance of emergency medical procedures and treatments upon me in both transit and upon arrival at the receiving facility  Additionally, I authorize the release of any and all medical records to the receiving facility and request they be transported with me, if possible  I understand that the safest mode of transportation during a medical emergency is an ambulance and that the Hospital advocates the use of this mode of transport  Risks of traveling to the receiving facility by car, including absence of medical control, life sustaining equipment, such as oxygen, and medical personnel has been explained to me and I fully understand them  (PRAKASH CORRECT BOX BELOW)  [  ]  I consent to the stated transfer and to be transported by ambulance/helicopter    [  ]  I consent to the stated transfer, but refuse transportation by ambulance and accept full responsibility for my transportation by car  I understand the risks of non-ambulance transfers and I exonerate the Hospital and its staff from any deterioration in my condition that results from this refusal     X___________________________________________    DATE  21  TIME________  Signature of patient or legally responsible individual signing on patient behalf           RELATIONSHIP TO PATIENT_________________________          Provider Certification    NAME Leydi Mcgrath                                         1970                              MRN [id number]    A medical screening exam was performed on the above named patient  Based on the examination:    Condition Necessitating Transfer The primary encounter diagnosis was Mood disorder ([facility] [address] )  A diagnosis of Suicidal ideation was also pertinent to this visit  Patient Condition: The patient has been stabilized such that within reasonable medical probability, no material deterioration of the patient condition or the condition of the unborn child(courtney) is likely to result from the transfer    Reason for Transfer: Level of Care needed not available at this facility    Transfer Requirements: [facility] [address]   · Space available and qualified personnel available for treatment as acknowledged by    · Agreed to accept transfer and to provide appropriate medical treatment as acknowledged by       Dr Abram Kim  · Appropriate medical records of the examination and treatment of the patient are provided at the time of transfer   [address], [address] _______  · Transfer will be performed by qualified personnel from    and appropriate transfer equipment as required, including the use of necessary and appropriate life support measures      Provider Certification: I have examined the patient and explained the following risks and benefits of being transferred/refusing transfer to the patient/family:  General risk, such as traffic hazards, adverse weather conditions, rough terrain or turbulence, possible failure of equipment (including vehicle or aircraft), or consequences of actions of persons outside the control of the transport personnel, Unanticipated needs of medical equipment and personnel during transport, The possibility of a transport vehicle being unavailable, Risk of worsening condition      Based on these reasonable risks and benefits to the patient and/or the unborn child(courtney), and based upon the information available at the time of the patients examination, I certify that the medical benefits reasonably to be expected from the provision of appropriate medical treatments at another medical facility outweigh the increasing risks, if any, to the individuals medical condition, and in the case of labor to the unborn child, from effecting the transfer      X____________________________________________ DATE 01/08/21        TIME_______      ORIGINAL - SEND TO MEDICAL RECORDS   COPY - SEND WITH PATIENT DURING TRANSFER

## 2021-01-08 NOTE — ED NOTES
Medicare A & B primary    Insurance Authorization for admission:   Phone call placed to [facility]  Phone number: [phone] to [address] completed  Level of care: 201 inpatient mental health treatment         EVS (Eligibility Verification System) called - [phone]  Automated system indicates: active and eligible with [facility]

## 2021-01-08 NOTE — ED NOTES
Patient appears to be sleeping  Symmetrical chest rise, no facial grimace, and comfortable position noted        Rosa Elena Newby RN  01/08/21 5983

## 2021-01-09 PROBLEM — G89.29 CHRONIC MIDLINE LOW BACK PAIN WITHOUT SCIATICA: Status: ACTIVE | Noted: 2021-01-09

## 2021-01-09 PROBLEM — E78.5 HYPERLIPIDEMIA: Status: ACTIVE | Noted: 2021-01-09

## 2021-01-09 PROBLEM — E66.813 CLASS 3 SEVERE OBESITY DUE TO EXCESS CALORIES WITHOUT SERIOUS COMORBIDITY IN ADULT (HCC): Status: ACTIVE | Noted: 2021-01-09

## 2021-01-09 PROBLEM — B96.89 BACTERIAL CONJUNCTIVITIS OF BOTH EYES: Status: ACTIVE | Noted: 2021-01-09

## 2021-01-09 PROBLEM — H10.9 BACTERIAL CONJUNCTIVITIS OF BOTH EYES: Status: ACTIVE | Noted: 2021-01-09

## 2021-01-09 PROBLEM — F31.9 BIPOLAR DISORDER (HCC): Status: ACTIVE | Noted: 2021-01-09

## 2021-01-09 PROBLEM — F43.10 PTSD (POST-TRAUMATIC STRESS DISORDER): Status: ACTIVE | Noted: 2021-01-09

## 2021-01-09 PROBLEM — E66.01 CLASS 3 SEVERE OBESITY DUE TO EXCESS CALORIES WITHOUT SERIOUS COMORBIDITY IN ADULT (HCC): Status: ACTIVE | Noted: 2021-01-09

## 2021-01-09 PROBLEM — I10 ESSENTIAL HYPERTENSION: Status: ACTIVE | Noted: 2021-01-09

## 2021-01-09 PROBLEM — Z00.8 MEDICAL CLEARANCE FOR PSYCHIATRIC ADMISSION: Status: ACTIVE | Noted: 2021-01-09

## 2021-01-09 PROBLEM — M54.50 CHRONIC MIDLINE LOW BACK PAIN WITHOUT SCIATICA: Status: ACTIVE | Noted: 2021-01-09

## 2021-01-09 LAB
BACTERIA UR QL AUTO: ABNORMAL /HPF
BILIRUB UR QL STRIP: NEGATIVE
CLARITY UR: CLEAR
COLOR UR: YELLOW
GLUCOSE UR STRIP-MCNC: NEGATIVE MG/DL
HGB UR QL STRIP.AUTO: NEGATIVE
HYALINE CASTS #/AREA URNS LPF: ABNORMAL /LPF
KETONES UR STRIP-MCNC: NEGATIVE MG/DL
LEUKOCYTE ESTERASE UR QL STRIP: ABNORMAL
MUCOUS THREADS UR QL AUTO: ABNORMAL
NITRITE UR QL STRIP: NEGATIVE
NON-SQ EPI CELLS URNS QL MICRO: ABNORMAL /HPF
PH UR STRIP.AUTO: 6.5 [PH]
PROT UR STRIP-MCNC: NEGATIVE MG/DL
RBC #/AREA URNS AUTO: ABNORMAL /HPF
SP GR UR STRIP.AUTO: 1.02 (ref 1–1.03)
UROBILINOGEN UR QL STRIP.AUTO: 1 E.U./DL
WBC #/AREA URNS AUTO: ABNORMAL /HPF

## 2021-01-09 PROCEDURE — 99223 1ST HOSP IP/OBS HIGH 75: CPT | Performed by: PHYSICIAN ASSISTANT

## 2021-01-09 PROCEDURE — 81001 URINALYSIS AUTO W/SCOPE: CPT | Performed by: PHYSICIAN ASSISTANT

## 2021-01-09 PROCEDURE — 99222 1ST HOSP IP/OBS MODERATE 55: CPT | Performed by: STUDENT IN AN ORGANIZED HEALTH CARE EDUCATION/TRAINING PROGRAM

## 2021-01-09 RX ORDER — PANTOPRAZOLE SODIUM 20 MG/1
20 TABLET, DELAYED RELEASE ORAL
Status: DISCONTINUED | OUTPATIENT
Start: 2021-01-09 | End: 2021-01-18 | Stop reason: HOSPADM

## 2021-01-09 RX ORDER — ZIPRASIDONE HYDROCHLORIDE 20 MG/1
20 CAPSULE ORAL 2 TIMES DAILY WITH MEALS
Status: DISCONTINUED | OUTPATIENT
Start: 2021-01-09 | End: 2021-01-11

## 2021-01-09 RX ORDER — ERYTHROMYCIN 5 MG/G
0.5 OINTMENT OPHTHALMIC EVERY 6 HOURS SCHEDULED
Status: DISPENSED | OUTPATIENT
Start: 2021-01-09 | End: 2021-01-14

## 2021-01-09 RX ORDER — LIDOCAINE 50 MG/G
1 PATCH TOPICAL DAILY
Status: DISCONTINUED | OUTPATIENT
Start: 2021-01-09 | End: 2021-01-18 | Stop reason: HOSPADM

## 2021-01-09 RX ORDER — ASPIRIN 81 MG/1
81 TABLET ORAL DAILY
Status: DISCONTINUED | OUTPATIENT
Start: 2021-01-09 | End: 2021-01-18 | Stop reason: HOSPADM

## 2021-01-09 RX ORDER — PRAZOSIN HYDROCHLORIDE 1 MG/1
1 CAPSULE ORAL
Status: DISCONTINUED | OUTPATIENT
Start: 2021-01-09 | End: 2021-01-18 | Stop reason: HOSPADM

## 2021-01-09 RX ADMIN — PANTOPRAZOLE SODIUM 20 MG: 20 TABLET, DELAYED RELEASE ORAL at 09:18

## 2021-01-09 RX ADMIN — DIVALPROEX SODIUM 500 MG: 500 TABLET, DELAYED RELEASE ORAL at 21:15

## 2021-01-09 RX ADMIN — HALOPERIDOL 5 MG: 5 TABLET ORAL at 07:56

## 2021-01-09 RX ADMIN — TRAZODONE HYDROCHLORIDE 50 MG: 50 TABLET ORAL at 21:15

## 2021-01-09 RX ADMIN — ZIPRASIDONE HYDROCHLORIDE 20 MG: 20 CAPSULE ORAL at 10:19

## 2021-01-09 RX ADMIN — LISINOPRIL 20 MG: 20 TABLET ORAL at 08:32

## 2021-01-09 RX ADMIN — ASPIRIN 81 MG: 81 TABLET, COATED ORAL at 08:04

## 2021-01-09 RX ADMIN — DIVALPROEX SODIUM 500 MG: 500 TABLET, DELAYED RELEASE ORAL at 08:04

## 2021-01-09 RX ADMIN — ZIPRASIDONE HYDROCHLORIDE 20 MG: 20 CAPSULE ORAL at 17:00

## 2021-01-09 RX ADMIN — LORAZEPAM 1 MG: 1 TABLET ORAL at 07:55

## 2021-01-09 RX ADMIN — OLANZAPINE 5 MG: 5 TABLET, FILM COATED ORAL at 21:15

## 2021-01-09 RX ADMIN — ERYTHROMYCIN 0.5 INCH: 5 OINTMENT OPHTHALMIC at 17:00

## 2021-01-09 RX ADMIN — LIDOCAINE 1 PATCH: 50 PATCH TOPICAL at 08:02

## 2021-01-09 RX ADMIN — PRAZOSIN HYDROCHLORIDE 1 MG: 1 CAPSULE ORAL at 21:15

## 2021-01-09 RX ADMIN — FLUOXETINE 40 MG: 20 CAPSULE ORAL at 08:04

## 2021-01-09 RX ADMIN — ATORVASTATIN CALCIUM 40 MG: 40 TABLET, FILM COATED ORAL at 17:00

## 2021-01-09 NOTE — ASSESSMENT & PLAN NOTE
· Patient reports mild midline low back tenderness for several months  · Is able to ambulate without difficulty  No numbness/tingling    No bowel/bladder incontinence  · Reports relief with ibuprofen  · Add lidocaine patch, heating pad PRN

## 2021-01-09 NOTE — ASSESSMENT & PLAN NOTE
· Noted to have purulent discharge, L > R  Conjunctivae injected    Pt denies visual changes  · Start erythromycin drops qid   · May use warm compresses

## 2021-01-09 NOTE — TREATMENT PLAN
TREATMENT PLAN REVIEW - Dg [age] y o  1970 female MRN: [id number]    [address] [place] [address] Room / Bed: [place] [address]/[facility] [id number] Encounter: [id number]          Admit Date/Time:  1/8/2021  7:54 PM    Treatment Team: Attending Provider: Merari Zarco MD; Consulting Physician: Irwin Morales MD; Registered Nurse: Tanesha Bates RN; Medications RN: Marisol Unger RN; Patient Care Technician: Avinash Rodrigues; Patient Care Technician: Johnny Yancey; Patient Care Assistant: Wade Tse; Registered Nurse: Jose Angel Chaney LPN; Registered Nurse: Christina Malhotra RN; Registered Nurse: Catarina Landau, RN; Security: Leticia Salts;  Patient Care Technician: Yee Wbeb    Diagnosis: Principal Problem:    Bipolar disorder ([facility] [address] )  Active Problems:    Medical clearance for psychiatric admission    Essential hypertension    Hyperlipidemia    Class 3 severe obesity due to excess calories without serious comorbidity in adult [facility])    Chronic midline low back pain without sciatica    Bacterial conjunctivitis of both eyes    PTSD (post-traumatic stress disorder)      Patient Strengths/Assets: cooperative, motivation for treatment/growth, patient is on a voluntary commitment    Patient Barriers/Limitations: lack of social/family support    Short Term Goals: decrease in depressive symptoms, decrease in anxiety symptoms, decrease in paranoid thoughts, decrease in psychotic symptoms, decrease in suicidal thoughts    Long Term Goals: improvement in depression, improvement in anxiety, resolution of depressive symptoms, stabilization of mood, free of suicidal thoughts    Progress Towards Goals: starting psychiatric medications as prescribed    Recommended Treatment: medication management, patient medication education, group therapy, milieu therapy, continued Behavioral Health psychiatric evaluation/assessment process    Treatment Frequency: daily medication monitoring, group and milieu therapy daily, monitoring through interdisciplinary rounds, monitoring through weekly patient care conferences    Expected Discharge Date:  5-7 days    Discharge Plan: referral for outpatient medication management with a psychiatrist, referral for outpatient psychotherapy    Treatment Plan Created/Updated By: Scott Villalobos MD

## 2021-01-09 NOTE — PROGRESS NOTES
Patient arrived to the unit with the following items:      At bedside: a shirt, pair of pants, pair of underwear, 2 puzzle books, and deodorant    In locker: a sweatshirt, cell phone, , paperwork, toothbrush, toothpaste and a pen

## 2021-01-09 NOTE — ED NOTES
Patient states, "How are you tonight? 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

## 2021-01-09 NOTE — TREATMENT TEAM
01/09/21 0700   Team Meeting   Meeting Type Daily Rounds   Team Members Present   Team Members Present Physician;Nurse   Physician Team Member Dr Vincent Gaffney Team Member 31 Christy Antonio   Patient/Family Present   Patient Present No   Patient's Family Present No   Daily Rounds: Pt new admission from [address] [facility] ED on 201 with AH to cut throat/wrists  UDS/BAT neg  Pt resides at [facility]  Hx of bipolar and PTSD  Pt c/o AH this morning and received PRN Haldol and Ativan  Visible on unit and sitting in tv room
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
## 2021-01-09 NOTE — PROGRESS NOTES
Leydi has been visible throughout shift oberved socializing with peers  Pt was med and meal compliant and continues to voices auditory hallucinations, pt states voices are"telling me to hurt myself"  Individual denies plan and currently contracts for safety on unit  Pt was encouraged to seek staff with any changes  Will continue to monitor
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
## 2021-01-09 NOTE — CONSULTS
Consult- Miguel Alt 1970, 53 y o  female MRN: [id number]    Unit/Bed#: [facility] 254-02 Encounter: [id number]    Primary Care Provider: No primary care provider on file  Date and time admitted to hospital: 1/8/2021  7:54 PM      Inpatient consult for Medical Clearance for [address] patient  Consult performed by: Laney Mandel PA-C  Consult ordered by: Alon Gonzalez PA-C        Medical clearance for psychiatric admission  Assessment & Plan  · Vital signs stable at time of assessment  · CBC, CMP, TSH ordered not yet collected  · Check EKG  · Patient appears medically stable at this time for inpatient psychiatric treatment    Essential hypertension  Assessment & Plan  · Blood pressure stable  · Continue lisinopril    Hyperlipidemia  Assessment & Plan  · Continue statin    Bacterial conjunctivitis of both eyes  Assessment & Plan  · Noted to have purulent discharge, L > R  Conjunctivae injected  Pt denies visual changes  · Start erythromycin drops qid   · May use warm compresses    Chronic midline low back pain without sciatica  Assessment & Plan  · Patient reports mild midline low back tenderness for several months  · Is able to ambulate without difficulty  No numbness/tingling  No bowel/bladder incontinence  · Reports relief with ibuprofen  · Add lidocaine patch, heating pad PRN    Class 3 severe obesity due to excess calories without serious comorbidity in adult [facility])  Assessment & Plan  · BMI noted to be 45 35  · Would benefit from weight loss  · Lifestyle modifications    Bipolar disorder ([facility] [address] )  Assessment & Plan  · Presented to the ED with auditory hallucinations  · Further plan per psychiatry    VTE Prophylaxis: Reason for no pharmacologic prophylaxis ambulate  / reason for no mechanical VTE prophylaxis psychiatric unit, ambulate     Recommendations for Discharge:  · Follow up with PCP after discharge    Counseling / Coordination of Care Time: 30 minutes    Greater than 50% of total time spent on patient counseling and coordination of care  Collaboration of Care: Were Recommendations Directly Discussed with Primary Treatment Team? - No     History of Present Illness:    Ric Preston is a 53 y o  female who is originally admitted to the psychiatry service due to [facility]  We are consulted for medical clearance  Past medical history significant for HTN, HLD, bipolar disorder  Patient presented to the ED due to persistent AH to harm herself  Patient reports low back pain which has been ongoing for several months - she denies any trauma/recent falls  Is able to ambulate without difficulty  Denies any radiation of pain down her legs or numbness/tingling  Denies saddle anesthesia or urinary/bowel incontinence  Ibuprofen usually helps  She also reports in the morning her eyes have been very itchy with thick discharge  Denies any visual changes  Denies chest pain/palpitations, shortness of breath, nausea/vomiting, abdominal pain  Review of Systems:    Review of Systems   Constitutional: Negative for chills, fatigue, fever and unexpected weight change  HENT: Negative for congestion, sore throat and trouble swallowing  Eyes: Positive for discharge, redness and itching  Negative for photophobia, pain and visual disturbance  Respiratory: Negative for cough, shortness of breath and wheezing  Cardiovascular: Negative for chest pain, palpitations and leg swelling  Gastrointestinal: Negative for abdominal pain, constipation, diarrhea, nausea and vomiting  Endocrine: Negative for polyuria  Genitourinary: Negative for difficulty urinating, dysuria, flank pain, hematuria and urgency  Musculoskeletal: Positive for back pain  Negative for myalgias, neck pain and neck stiffness  Skin: Negative for pallor and rash  Neurological: Negative for dizziness, tremors, syncope, weakness, light-headedness, numbness and headaches  Hematological: Does not bruise/bleed easily     Psychiatric/Behavioral: Positive for hallucinations  Negative for agitation and confusion  The patient is nervous/anxious  Past Medical and Surgical History:     Past Medical History:   Diagnosis Date    Bipolar 1 disorder ([address] )     Hypertension     Psychiatric disorder     PTSD (post-traumatic stress disorder)        Past Surgical History:   Procedure Laterality Date    APPENDECTOMY      CHOLECYSTECTOMY      FOOT SURGERY Right        Meds/Allergies:    all medications and allergies reviewed    Allergies: Allergies   Allergen Reactions    Fish-Derived Products        Social History:     Marital Status: Single    Substance Use History:   Social History     Substance and Sexual Activity   Alcohol Use Not Currently     Social History     Tobacco Use   Smoking Status Former Smoker   Smokeless Tobacco Never Used     Social History     Substance and Sexual Activity   Drug Use Not Currently       Family History:    History reviewed  No pertinent family history  Physical Exam:     Vitals:   Blood Pressure: 115/64 (01/09/21 0727)  Pulse: 99 (01/09/21 0727)  Temperature: (!) 96 5 °F (35 8 °C) (01/09/21 0727)  Temp Source: Tympanic (01/09/21 0727)  Respirations: 16 (01/09/21 0727)  Height: 5' 6" (167 6 cm) (01/08/21 2019)  Weight - Scale: 127 kg (281 lb) (01/08/21 2019)  SpO2: 96 % (01/08/21 2019)    Physical Exam  Vitals signs and nursing note reviewed  Constitutional:       Appearance: Normal appearance  Comments: Appears comfortable, no acute distress   HENT:      Head: Normocephalic  Eyes:      General: No scleral icterus  Extraocular Movements: Extraocular movements intact  Conjunctiva/sclera:      Right eye: Right conjunctiva is injected  Exudate present  Left eye: Left conjunctiva is injected  Exudate present  Neck:      Musculoskeletal: Normal range of motion  Cardiovascular:      Rate and Rhythm: Normal rate and regular rhythm        Heart sounds: S1 normal and S2 normal    Pulmonary:      Effort: Pulmonary effort is normal       Breath sounds: Normal breath sounds  No wheezing, rhonchi or rales  Abdominal:      General: Bowel sounds are normal       Palpations: Abdomen is soft  Tenderness: There is no abdominal tenderness  There is no guarding or rebound  Musculoskeletal:         General: No swelling, tenderness or deformity  Comments: Ambulating unit without difficulty  Paraspinal tenderness lumbar spine  No obvious deformity  No extremity edema   Skin:     General: Skin is warm and dry  Neurological:      Mental Status: She is alert and oriented to person, place, and time  Psychiatric:         Mood and Affect: Mood normal          Speech: Speech normal          Behavior: Behavior normal            Additional Data:     Lab Results: I have personally reviewed pertinent reports  No results found for: HGBA1C            Imaging: I have personally reviewed pertinent reports  No orders to display       EKG, Pathology, and Other Studies Reviewed on Admission:   · EKG: pending    ** Please Note: This note has been constructed using a voice recognition system   **

## 2021-01-09 NOTE — ASSESSMENT & PLAN NOTE
· Vital signs stable at time of assessment  · CBC, CMP, TSH ordered not yet collected  · Check EKG  · Patient appears medically stable at this time for inpatient psychiatric treatment

## 2021-01-09 NOTE — CMS CERTIFICATION NOTE
Recertification: Based upon physical, mental and social evaluations, I certify that inpatient psychiatric services continue to be medically necessary for this patient for a duration of 7 midnights for the treatment of  Bipolar disorder [facility])   Available alternative community resources still do not meet the patient's mental health care needs  I further attest that an established written individualized plan of care has been updated and is outlined in the patient's medical records

## 2021-01-10 LAB
ALBUMIN SERPL BCP-MCNC: 3.1 G/DL (ref 3.5–5)
ALP SERPL-CCNC: 77 U/L (ref 46–116)
ALT SERPL W P-5'-P-CCNC: 23 U/L (ref 12–78)
ANION GAP SERPL CALCULATED.3IONS-SCNC: 6 MMOL/L (ref 4–13)
AST SERPL W P-5'-P-CCNC: 11 U/L (ref 5–45)
BASOPHILS # BLD AUTO: 0.05 THOUSANDS/ΜL (ref 0–0.1)
BASOPHILS NFR BLD AUTO: 1 % (ref 0–1)
BILIRUB SERPL-MCNC: 0.6 MG/DL (ref 0.2–1)
BUN SERPL-MCNC: 16 MG/DL (ref 5–25)
CALCIUM ALBUM COR SERPL-MCNC: 9.4 MG/DL (ref 8.3–10.1)
CALCIUM SERPL-MCNC: 8.7 MG/DL (ref 8.3–10.1)
CHLORIDE SERPL-SCNC: 101 MMOL/L (ref 100–108)
CO2 SERPL-SCNC: 31 MMOL/L (ref 21–32)
CREAT SERPL-MCNC: 0.62 MG/DL (ref 0.6–1.3)
EOSINOPHIL # BLD AUTO: 0.08 THOUSAND/ΜL (ref 0–0.61)
EOSINOPHIL NFR BLD AUTO: 1 % (ref 0–6)
ERYTHROCYTE [DISTWIDTH] IN BLOOD BY AUTOMATED COUNT: 14.5 % (ref 11.6–15.1)
GFR SERPL CREATININE-BSD FRML MDRD: 106 ML/MIN/1.73SQ M
GLUCOSE P FAST SERPL-MCNC: 107 MG/DL (ref 65–99)
GLUCOSE SERPL-MCNC: 107 MG/DL (ref 65–140)
HCG SERPL QL: NEGATIVE
HCT VFR BLD AUTO: 35.8 % (ref 34.8–46.1)
HGB BLD-MCNC: 11.6 G/DL (ref 11.5–15.4)
IMM GRANULOCYTES # BLD AUTO: 0.09 THOUSAND/UL (ref 0–0.2)
IMM GRANULOCYTES NFR BLD AUTO: 1 % (ref 0–2)
LYMPHOCYTES # BLD AUTO: 2 THOUSANDS/ΜL (ref 0.6–4.47)
LYMPHOCYTES NFR BLD AUTO: 29 % (ref 14–44)
MCH RBC QN AUTO: 31.4 PG (ref 26.8–34.3)
MCHC RBC AUTO-ENTMCNC: 32.4 G/DL (ref 31.4–37.4)
MCV RBC AUTO: 97 FL (ref 82–98)
MONOCYTES # BLD AUTO: 0.67 THOUSAND/ΜL (ref 0.17–1.22)
MONOCYTES NFR BLD AUTO: 10 % (ref 4–12)
NEUTROPHILS # BLD AUTO: 4.09 THOUSANDS/ΜL (ref 1.85–7.62)
NEUTS SEG NFR BLD AUTO: 58 % (ref 43–75)
NRBC BLD AUTO-RTO: 0 /100 WBCS
PLATELET # BLD AUTO: 203 THOUSANDS/UL (ref 149–390)
PMV BLD AUTO: 10.3 FL (ref 8.9–12.7)
POTASSIUM SERPL-SCNC: 4.1 MMOL/L (ref 3.5–5.3)
PROT SERPL-MCNC: 6.6 G/DL (ref 6.4–8.2)
RBC # BLD AUTO: 3.7 MILLION/UL (ref 3.81–5.12)
SODIUM SERPL-SCNC: 138 MMOL/L (ref 136–145)
TSH SERPL DL<=0.05 MIU/L-ACNC: 3.64 UIU/ML (ref 0.36–3.74)
VALPROATE SERPL-MCNC: 48 UG/ML (ref 50–100)
WBC # BLD AUTO: 6.98 THOUSAND/UL (ref 4.31–10.16)

## 2021-01-10 PROCEDURE — 84443 ASSAY THYROID STIM HORMONE: CPT | Performed by: STUDENT IN AN ORGANIZED HEALTH CARE EDUCATION/TRAINING PROGRAM

## 2021-01-10 PROCEDURE — 80164 ASSAY DIPROPYLACETIC ACD TOT: CPT | Performed by: STUDENT IN AN ORGANIZED HEALTH CARE EDUCATION/TRAINING PROGRAM

## 2021-01-10 PROCEDURE — 93005 ELECTROCARDIOGRAM TRACING: CPT

## 2021-01-10 PROCEDURE — 84703 CHORIONIC GONADOTROPIN ASSAY: CPT | Performed by: STUDENT IN AN ORGANIZED HEALTH CARE EDUCATION/TRAINING PROGRAM

## 2021-01-10 PROCEDURE — 80053 COMPREHEN METABOLIC PANEL: CPT | Performed by: STUDENT IN AN ORGANIZED HEALTH CARE EDUCATION/TRAINING PROGRAM

## 2021-01-10 PROCEDURE — 85025 COMPLETE CBC W/AUTO DIFF WBC: CPT | Performed by: STUDENT IN AN ORGANIZED HEALTH CARE EDUCATION/TRAINING PROGRAM

## 2021-01-10 PROCEDURE — 99232 SBSQ HOSP IP/OBS MODERATE 35: CPT | Performed by: STUDENT IN AN ORGANIZED HEALTH CARE EDUCATION/TRAINING PROGRAM

## 2021-01-10 PROCEDURE — 86592 SYPHILIS TEST NON-TREP QUAL: CPT | Performed by: STUDENT IN AN ORGANIZED HEALTH CARE EDUCATION/TRAINING PROGRAM

## 2021-01-10 RX ADMIN — DIVALPROEX SODIUM 500 MG: 500 TABLET, DELAYED RELEASE ORAL at 08:09

## 2021-01-10 RX ADMIN — TRAZODONE HYDROCHLORIDE 50 MG: 50 TABLET ORAL at 21:58

## 2021-01-10 RX ADMIN — ERYTHROMYCIN 0.5 INCH: 5 OINTMENT OPHTHALMIC at 12:02

## 2021-01-10 RX ADMIN — ASPIRIN 81 MG: 81 TABLET, COATED ORAL at 08:10

## 2021-01-10 RX ADMIN — FLUOXETINE 40 MG: 20 CAPSULE ORAL at 08:10

## 2021-01-10 RX ADMIN — LIDOCAINE 1 PATCH: 50 PATCH TOPICAL at 08:10

## 2021-01-10 RX ADMIN — ZIPRASIDONE HYDROCHLORIDE 20 MG: 20 CAPSULE ORAL at 08:09

## 2021-01-10 RX ADMIN — ATORVASTATIN CALCIUM 40 MG: 40 TABLET, FILM COATED ORAL at 16:52

## 2021-01-10 RX ADMIN — ZIPRASIDONE HYDROCHLORIDE 20 MG: 20 CAPSULE ORAL at 16:52

## 2021-01-10 RX ADMIN — PRAZOSIN HYDROCHLORIDE 1 MG: 1 CAPSULE ORAL at 21:57

## 2021-01-10 RX ADMIN — DIVALPROEX SODIUM 500 MG: 500 TABLET, DELAYED RELEASE ORAL at 21:57

## 2021-01-10 RX ADMIN — ERYTHROMYCIN 0.5 INCH: 5 OINTMENT OPHTHALMIC at 18:03

## 2021-01-10 RX ADMIN — OLANZAPINE 5 MG: 5 TABLET, FILM COATED ORAL at 21:57

## 2021-01-10 RX ADMIN — PANTOPRAZOLE SODIUM 20 MG: 20 TABLET, DELAYED RELEASE ORAL at 05:52

## 2021-01-10 NOTE — PROGRESS NOTES
Pt pleasant, calm and cooperative  Visible in the milieu, social with select peers  Attended groups today  Denies SI but reports AH and racing thoughts about "wanting to harm myself", verbally contracts for safety  Denies HI/VH  Compliant with medications  Denies concerns

## 2021-01-10 NOTE — PLAN OF CARE
Problem: Alteration in Thoughts and Perception  Goal: Verbalize thoughts and feelings  Description: Interventions:  - Promote a nonjudgmental and trusting relationship with the patient through active listening and therapeutic communication  - Assess patient's level of functioning, behavior and potential for risk  - Engage patient in 1 on 1 interactions  - Encourage patient to express fears, feelings, frustrations, and discuss symptoms    - [place] patient to reality, help patient recognize reality-based thinking   - Administer medications as ordered and assess for potential side effects  - Provide the patient education related to the signs and symptoms of the illness and desired effects of prescribed medications  Outcome: Progressing  Goal: Refrain from acting on delusional thinking/internal stimuli  Description: Interventions:  - Monitor patient closely, per order   - Utilize least restrictive measures   - Set reasonable limits, give positive feedback for acceptable   - Administer medications as ordered and monitor of potential side effects  Outcome: Progressing     Problem: Depression  Goal: Refrain from harming self  Description: Interventions:  - Monitor patient closely, per order   - Supervise medication ingestion, monitor effects and side effects   Outcome: Progressing  Goal: Refrain from isolation  Description: Interventions:  - Develop a trusting relationship   - Encourage socialization   Outcome: Progressing  Goal: Attend and participate in unit activities, including therapeutic, recreational, and educational groups  Description: Interventions:  - Provide therapeutic and educational activities daily, encourage attendance and participation, and document same in the medical record   Outcome: Progressing  Goal: Complete daily ADLs, including personal hygiene independently, as able  Description: Interventions:  - Observe, teach, and assist patient with ADLS  -  Monitor and promote a balance of rest/activity, with adequate nutrition and elimination   Outcome: Progressing     Problem: Anxiety  Goal: Anxiety is at manageable level  Description: Interventions:  - Assess and monitor patient's anxiety level  - Monitor for signs and symptoms (heart palpitations, chest pain, shortness of breath, headaches, nausea, feeling jumpy, restlessness, irritable, apprehensive)  - Collaborate with interdisciplinary team and initiate plan and interventions as ordered    - [place] patient to unit/surroundings  - Explain treatment plan  - Encourage participation in care  - Encourage verbalization of concerns/fears  - Identify coping mechanisms  - Assist in developing anxiety-reducing skills  - Administer/offer alternative therapies  - Limit or eliminate stimulants  Outcome: Progressing     Problem: SELF HARM/SUICIDALITY  Goal: Will have no self-injury during hospital stay  Description: INTERVENTIONS:  - Q 15 MINUTES: Routine safety checks  - Q WAKING SHIFT & PRN: Assess risk to determine if routine checks are adequate to maintain patient safety  - Encourage patient to participate actively in care by formulating a plan to combat response to suicidal ideation, identify supports and resources  Outcome: Progressing

## 2021-01-10 NOTE — NURSING NOTE
Pt visible in the milieu, observed watching tv with peers social   Did not attend evening group, however, was in attendance to morning group and participated appropriately  Denies HI and visual hallucinations  Continues to acknowledge auditory voices telling her to hurt herself  Pt verbally contracts for safety on the unit  Will continue to monitor throughout the night

## 2021-01-10 NOTE — NURSING NOTE
Pt visible and social on unit  Pt reported having CAH of "my own voice to hurt myself  But I don't want to " Pt verbally contracts for safety on the unit  Pt encouraged to utilize coping skills and pt agreeable  Pt sitting in dayroom watching TV with peers  Pt denies any questions or concerns at this time

## 2021-01-10 NOTE — PLAN OF CARE
Problem: Alteration in Thoughts and Perception  Goal: Verbalize thoughts and feelings  Description: Interventions:  - Promote a nonjudgmental and trusting relationship with the patient through active listening and therapeutic communication  - Assess patient's level of functioning, behavior and potential for risk  - Engage patient in 1 on 1 interactions  - Encourage patient to express fears, feelings, frustrations, and discuss symptoms    - [place] patient to reality, help patient recognize reality-based thinking   - Administer medications as ordered and assess for potential side effects  - Provide the patient education related to the signs and symptoms of the illness and desired effects of prescribed medications  Outcome: Progressing  Goal: Refrain from acting on delusional thinking/internal stimuli  Description: Interventions:  - Monitor patient closely, per order   - Utilize least restrictive measures   - Set reasonable limits, give positive feedback for acceptable   - Administer medications as ordered and monitor of potential side effects  Outcome: Progressing     Problem: Depression  Goal: Refrain from harming self  Description: Interventions:  - Monitor patient closely, per order   - Supervise medication ingestion, monitor effects and side effects   Outcome: Progressing  Goal: Refrain from isolation  Description: Interventions:  - Develop a trusting relationship   - Encourage socialization   Outcome: Progressing  Goal: Refrain from self-neglect  Outcome: Progressing  Goal: Attend and participate in unit activities, including therapeutic, recreational, and educational groups  Description: Interventions:  - Provide therapeutic and educational activities daily, encourage attendance and participation, and document same in the medical record   Outcome: Progressing  Goal: Complete daily ADLs, including personal hygiene independently, as able  Description: Interventions:  - Observe, teach, and assist patient with ADLS  -  Monitor and promote a balance of rest/activity, with adequate nutrition and elimination   Outcome: Progressing     Problem: Anxiety  Goal: Anxiety is at manageable level  Description: Interventions:  - Assess and monitor patient's anxiety level  - Monitor for signs and symptoms (heart palpitations, chest pain, shortness of breath, headaches, nausea, feeling jumpy, restlessness, irritable, apprehensive)  - Collaborate with interdisciplinary team and initiate plan and interventions as ordered    - [place] patient to unit/surroundings  - Explain treatment plan  - Encourage participation in care  - Encourage verbalization of concerns/fears  - Identify coping mechanisms  - Assist in developing anxiety-reducing skills  - Administer/offer alternative therapies  - Limit or eliminate stimulants  Outcome: Progressing     Problem: SELF HARM/SUICIDALITY  Goal: Will have no self-injury during hospital stay  Description: INTERVENTIONS:  - Q 15 MINUTES: Routine safety checks  - Q WAKING SHIFT & PRN: Assess risk to determine if routine checks are adequate to maintain patient safety  - Encourage patient to participate actively in care by formulating a plan to combat response to suicidal ideation, identify supports and resources  Outcome: Progressing

## 2021-01-10 NOTE — PROGRESS NOTES
Progress Note - Behavioral Health   Leydi Ehsan Zabala 53 y o  female MRN: [id number]  Unit/Bed#: [facility] 254-02 Encounter: [id number]    Assessment/Plan   Principal Problem:    Bipolar disorder ([address] )  Active Problems:    Medical clearance for psychiatric admission    Essential hypertension    Hyperlipidemia    Class 3 severe obesity due to excess calories without serious comorbidity in [facility])    Chronic midline low back pain without sciatica    Bacterial conjunctivitis of both eyes    PTSD (post-traumatic stress disorder)      Subjective: Patient was seen, chart reviewed and case discussed with team   Patient appears withdrawn, isolative and internally preoccupied  Endorses depressed mood, CAH to kill self and passive death wishes  Sleep and appetite are ok      Psychiatric Review of Systems:  Behavior over the last 24 hours:  unchanged  Sleep: normal  Appetite: normal  Medication side effects: No  ROS: no complaints, all others negative    Current Medications:  Current Facility-Administered Medications   Medication Dose Route Frequency    acetaminophen (TYLENOL) tablet 325 mg  325 mg Oral Q6H PRN    acetaminophen (TYLENOL) tablet 650 mg  650 mg Oral Q4H PRN    aluminum-magnesium hydroxide-simethicone (MYLANTA) oral suspension 30 mL  30 mL Oral Q4H PRN    aspirin (ECOTRIN LOW STRENGTH) EC tablet 81 mg  81 mg Oral Daily    atorvastatin (LIPITOR) tablet 40 mg  40 mg Oral Daily With Dinner    benztropine (COGENTIN) injection 1 mg  1 mg Intramuscular Q6H PRN    benztropine (COGENTIN) tablet 1 mg  1 mg Oral Q6H PRN    divalproex sodium (DEPAKOTE) EC tablet 500 mg  500 mg Oral Q12H [address]    erythromycin (ILOTYCIN) 0 5 % ophthalmic ointment 0 5 inch  0 5 inch Both Eyes Q6H [address]    FLUoxetine (PROzac) capsule 40 mg  40 mg Oral Daily    haloperidol (HALDOL) tablet 5 mg  5 mg Oral Q6H PRN    haloperidol lactate (HALDOL) injection 5 mg  5 mg Intramuscular Q6H PRN    hydrOXYzine HCL (ATARAX) tablet 25 mg  25 mg Oral Q6H PRN  hydrOXYzine HCL (ATARAX) tablet 50 mg  50 mg Oral Q6H PRN    ibuprofen (MOTRIN) tablet 600 mg  600 mg Oral Q6H PRN    lidocaine (LIDODERM) 5 % patch 1 patch  1 patch Topical Daily    LORazepam (ATIVAN) injection 2 mg  2 mg Intramuscular Q6H PRN    LORazepam (ATIVAN) tablet 1 mg  1 mg Oral Q6H PRN    magnesium hydroxide (MILK OF MAGNESIA) oral suspension 30 mL  30 mL Oral Daily PRN    OLANZapine (ZyPREXA) IM injection 10 mg  10 mg Intramuscular Q8H PRN    OLANZapine (ZyPREXA) tablet 10 mg  10 mg Oral Q8H PRN    OLANZapine (ZyPREXA) tablet 5 mg  5 mg Oral HS    pantoprazole (PROTONIX) EC tablet 20 mg  20 mg Oral Early Morning    prazosin (MINIPRESS) capsule 1 mg  1 mg Oral HS    risperiDONE (RisperDAL M-TABS) dispersible tablet 1 mg  1 mg Oral Q3H PRN    traZODone (DESYREL) tablet 50 mg  50 mg Oral HS PRN    ziprasidone (GEODON) capsule 20 mg  20 mg Oral BID With Meals       Behavioral Health Medications:   all current active meds have been reviewed, continue current psychiatric medications and current meds:   Current Facility-Administered Medications   Medication Dose Route Frequency    acetaminophen (TYLENOL) tablet 325 mg  325 mg Oral Q6H PRN    acetaminophen (TYLENOL) tablet 650 mg  650 mg Oral Q4H PRN    aluminum-magnesium hydroxide-simethicone (MYLANTA) oral suspension 30 mL  30 mL Oral Q4H PRN    aspirin (ECOTRIN LOW STRENGTH) EC tablet 81 mg  81 mg Oral Daily    atorvastatin (LIPITOR) tablet 40 mg  40 mg Oral Daily With Dinner    benztropine (COGENTIN) injection 1 mg  1 mg Intramuscular Q6H PRN    benztropine (COGENTIN) tablet 1 mg  1 mg Oral Q6H PRN    divalproex sodium (DEPAKOTE) EC tablet 500 mg  500 mg Oral Q12H [facility]    erythromycin (ILOTYCIN) 0 5 % ophthalmic ointment 0 5 inch  0 5 inch Both Eyes Q6H [facility]    FLUoxetine (PROzac) capsule 40 mg  40 mg Oral Daily    haloperidol (HALDOL) tablet 5 mg  5 mg Oral Q6H PRN    haloperidol lactate (HALDOL) injection 5 mg  5 mg Intramuscular Q6H PRN    hydrOXYzine HCL (ATARAX) tablet 25 mg  25 mg Oral Q6H PRN    hydrOXYzine HCL (ATARAX) tablet 50 mg  50 mg Oral Q6H PRN    ibuprofen (MOTRIN) tablet 600 mg  600 mg Oral Q6H PRN    lidocaine (LIDODERM) 5 % patch 1 patch  1 patch Topical Daily    LORazepam (ATIVAN) injection 2 mg  2 mg Intramuscular Q6H PRN    LORazepam (ATIVAN) tablet 1 mg  1 mg Oral Q6H PRN    magnesium hydroxide (MILK OF MAGNESIA) oral suspension 30 mL  30 mL Oral Daily PRN    OLANZapine (ZyPREXA) IM injection 10 mg  10 mg Intramuscular Q8H PRN    OLANZapine (ZyPREXA) tablet 10 mg  10 mg Oral Q8H PRN    OLANZapine (ZyPREXA) tablet 5 mg  5 mg Oral HS    pantoprazole (PROTONIX) EC tablet 20 mg  20 mg Oral Early Morning    prazosin (MINIPRESS) capsule 1 mg  1 mg Oral HS    risperiDONE (RisperDAL M-TABS) dispersible tablet 1 mg  1 mg Oral Q3H PRN    traZODone (DESYREL) tablet 50 mg  50 mg Oral HS PRN    ziprasidone (GEODON) capsule 20 mg  20 mg Oral BID With Meals     Vitals:  Vitals:    01/10/21 0726   BP: 136/65   Pulse: 79   Resp: 17   Temp: (!) 96 5 °F (35 8 °C)   SpO2:        Laboratory results:  I have personally reviewed all pertinent laboratory/tests results  Mental Status Evaluation:  Appearance:  age appropriate and casually dressed   Behavior:  guarded   Speech:  soft   Mood:  anxious and depressed   Affect:  constricted   Language Appropriate   Thought Process:  concrete, goal directed and logical   Thought Content:  normal   Perceptual Disturbances:  Auditory hallucinations with commands to kill self   Risk Potential: Suicidal Ideations none, Homicidal Ideations none and Potential for Aggression No   Sensorium:  person, place, time/date, situation, day of week, month of year and year   Cognition:  recent and remote memory grossly intact   Consciousness:  alert    Attention: attention span appeared shorter than expected for age   Insight:  limited   Judgment: limited   Gait/Station: normal gait/station   Motor Activity: no abnormal movements     Progress Toward Goals: Slow progress    Recommended Treatment: Continue with pharmacotherapy, group therapy, milieu therapy and occupational therapy

## 2021-01-10 NOTE — TREATMENT TEAM
01/10/21 0700   Team Meeting   Meeting Type Daily Rounds   Team Members Present   Team Members Present Physician;Nurse   Physician Team Member Dr Patt Mata Team Member 31 Christy Antonio   Patient/Family Present   Patient Present No   Patient's Family Present No   Daily Rounds: Pt is pleasant and calm on unit  Visible and social   Still having CAH yesterday however consenting for safety  Slept overnight

## 2021-01-11 LAB
ATRIAL RATE: 87 BPM
ATRIAL RATE: 87 BPM
P AXIS: 30 DEGREES
P AXIS: 30 DEGREES
PR INTERVAL: 212 MS
PR INTERVAL: 212 MS
QRS AXIS: 53 DEGREES
QRS AXIS: 53 DEGREES
QRSD INTERVAL: 88 MS
QRSD INTERVAL: 88 MS
QT INTERVAL: 380 MS
QT INTERVAL: 380 MS
QTC INTERVAL: 457 MS
QTC INTERVAL: 457 MS
RPR SER QL: NORMAL
T WAVE AXIS: 57 DEGREES
T WAVE AXIS: 57 DEGREES
VENTRICULAR RATE: 87 BPM
VENTRICULAR RATE: 87 BPM

## 2021-01-11 PROCEDURE — 99232 SBSQ HOSP IP/OBS MODERATE 35: CPT | Performed by: STUDENT IN AN ORGANIZED HEALTH CARE EDUCATION/TRAINING PROGRAM

## 2021-01-11 PROCEDURE — 93010 ELECTROCARDIOGRAM REPORT: CPT | Performed by: INTERNAL MEDICINE

## 2021-01-11 RX ORDER — ZIPRASIDONE HYDROCHLORIDE 40 MG/1
40 CAPSULE ORAL 2 TIMES DAILY WITH MEALS
Status: DISCONTINUED | OUTPATIENT
Start: 2021-01-11 | End: 2021-01-15

## 2021-01-11 RX ORDER — SENNA PLUS 8.6 MG/1
1 TABLET ORAL 2 TIMES DAILY PRN
Status: DISCONTINUED | OUTPATIENT
Start: 2021-01-11 | End: 2021-01-11

## 2021-01-11 RX ORDER — SENNOSIDES 8.6 MG
1 TABLET ORAL 2 TIMES DAILY PRN
Status: DISCONTINUED | OUTPATIENT
Start: 2021-01-11 | End: 2021-01-17

## 2021-01-11 RX ADMIN — LORAZEPAM 1 MG: 1 TABLET ORAL at 17:58

## 2021-01-11 RX ADMIN — OLANZAPINE 10 MG: 10 TABLET, FILM COATED ORAL at 12:26

## 2021-01-11 RX ADMIN — ATORVASTATIN CALCIUM 40 MG: 40 TABLET, FILM COATED ORAL at 17:15

## 2021-01-11 RX ADMIN — DIVALPROEX SODIUM 500 MG: 500 TABLET, DELAYED RELEASE ORAL at 09:33

## 2021-01-11 RX ADMIN — PRAZOSIN HYDROCHLORIDE 1 MG: 1 CAPSULE ORAL at 21:46

## 2021-01-11 RX ADMIN — ASPIRIN 81 MG: 81 TABLET, COATED ORAL at 09:33

## 2021-01-11 RX ADMIN — FLUOXETINE 40 MG: 20 CAPSULE ORAL at 09:33

## 2021-01-11 RX ADMIN — ZIPRASIDONE HYDROCHLORIDE 20 MG: 20 CAPSULE ORAL at 09:33

## 2021-01-11 RX ADMIN — ZIPRASIDONE HYDROCHLORIDE 40 MG: 40 CAPSULE ORAL at 17:15

## 2021-01-11 RX ADMIN — OLANZAPINE 5 MG: 5 TABLET, FILM COATED ORAL at 21:46

## 2021-01-11 RX ADMIN — DIVALPROEX SODIUM 500 MG: 500 TABLET, DELAYED RELEASE ORAL at 21:46
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
## 2021-01-11 NOTE — PROGRESS NOTES
Progress Note - Behavioral Health   Leydi Fulton 53 y o  female MRN: [id number]  Unit/Bed#: [facility] 254-02 Encounter: [id number]    Assessment/Plan   Principal Problem:    Bipolar disorder ([address] )  Active Problems:    Medical clearance for psychiatric admission    Essential hypertension    Hyperlipidemia    Class 3 severe obesity due to excess calories without serious comorbidity in [facility])    Chronic midline low back pain without sciatica    Bacterial conjunctivitis of both eyes    PTSD (post-traumatic stress disorder)      Subjective: Patient was seen, chart reviewed and case discussed with team   Patient endorses depressed mood and CAH to kill self  She denies any plan or intent  She received PRN medications  Sleep and appetite are good    Psychiatric Review of Systems:  Behavior over the last 24 hours:  unchanged  Sleep: normal  Appetite: normal  Medication side effects: No  ROS: no complaints, all others negative    Current Medications:  Current Facility-Administered Medications   Medication Dose Route Frequency    acetaminophen (TYLENOL) tablet 325 mg  325 mg Oral Q6H PRN    acetaminophen (TYLENOL) tablet 650 mg  650 mg Oral Q4H PRN    aluminum-magnesium hydroxide-simethicone (MYLANTA) oral suspension 30 mL  30 mL Oral Q4H PRN    aspirin (ECOTRIN LOW STRENGTH) EC tablet 81 mg  81 mg Oral Daily    atorvastatin (LIPITOR) tablet 40 mg  40 mg Oral Daily With Dinner    benztropine (COGENTIN) injection 1 mg  1 mg Intramuscular Q6H PRN    benztropine (COGENTIN) tablet 1 mg  1 mg Oral Q6H PRN    divalproex sodium (DEPAKOTE) EC tablet 500 mg  500 mg Oral Q12H [address]    erythromycin (ILOTYCIN) 0 5 % ophthalmic ointment 0 5 inch  0 5 inch Both Eyes Q6H [address]    FLUoxetine (PROzac) capsule 40 mg  40 mg Oral Daily    haloperidol (HALDOL) tablet 5 mg  5 mg Oral Q6H PRN    haloperidol lactate (HALDOL) injection 5 mg  5 mg Intramuscular Q6H PRN    hydrOXYzine HCL (ATARAX) tablet 25 mg  25 mg Oral Q6H PRN    hydrOXYzine HCL (ATARAX) tablet 50 mg  50 mg Oral Q6H PRN    ibuprofen (MOTRIN) tablet 600 mg  600 mg Oral Q6H PRN    lidocaine (LIDODERM) 5 % patch 1 patch  1 patch Topical Daily    LORazepam (ATIVAN) injection 2 mg  2 mg Intramuscular Q6H PRN    LORazepam (ATIVAN) tablet 1 mg  1 mg Oral Q6H PRN    magnesium hydroxide (MILK OF MAGNESIA) oral suspension 30 mL  30 mL Oral Daily PRN    OLANZapine (ZyPREXA) IM injection 10 mg  10 mg Intramuscular Q8H PRN    OLANZapine (ZyPREXA) tablet 10 mg  10 mg Oral Q8H PRN    OLANZapine (ZyPREXA) tablet 5 mg  5 mg Oral HS    pantoprazole (PROTONIX) EC tablet 20 mg  20 mg Oral Early Morning    prazosin (MINIPRESS) capsule 1 mg  1 mg Oral HS    risperiDONE (RisperDAL M-TABS) dispersible tablet 1 mg  1 mg Oral Q3H PRN    senna (SENOKOT) tablet 8 6 mg  1 tablet Oral BID PRN    traZODone (DESYREL) tablet 50 mg  50 mg Oral HS PRN    ziprasidone (GEODON) capsule 40 mg  40 mg Oral BID With Meals       Behavioral Health Medications:   all current active meds have been reviewed, continue current psychiatric medications and current meds:   Current Facility-Administered Medications   Medication Dose Route Frequency    acetaminophen (TYLENOL) tablet 325 mg  325 mg Oral Q6H PRN    acetaminophen (TYLENOL) tablet 650 mg  650 mg Oral Q4H PRN    aluminum-magnesium hydroxide-simethicone (MYLANTA) oral suspension 30 mL  30 mL Oral Q4H PRN    aspirin (ECOTRIN LOW STRENGTH) EC tablet 81 mg  81 mg Oral Daily    atorvastatin (LIPITOR) tablet 40 mg  40 mg Oral Daily With Dinner    benztropine (COGENTIN) injection 1 mg  1 mg Intramuscular Q6H PRN    benztropine (COGENTIN) tablet 1 mg  1 mg Oral Q6H PRN    divalproex sodium (DEPAKOTE) EC tablet 500 mg  500 mg Oral Q12H [address]    erythromycin (ILOTYCIN) 0 5 % ophthalmic ointment 0 5 inch  0 5 inch Both Eyes Q6H [address]    FLUoxetine (PROzac) capsule 40 mg  40 mg Oral Daily    haloperidol (HALDOL) tablet 5 mg  5 mg Oral Q6H PRN    haloperidol lactate (HALDOL) injection 5 mg  5 mg Intramuscular Q6H PRN    hydrOXYzine HCL (ATARAX) tablet 25 mg  25 mg Oral Q6H PRN    hydrOXYzine HCL (ATARAX) tablet 50 mg  50 mg Oral Q6H PRN    ibuprofen (MOTRIN) tablet 600 mg  600 mg Oral Q6H PRN    lidocaine (LIDODERM) 5 % patch 1 patch  1 patch Topical Daily    LORazepam (ATIVAN) injection 2 mg  2 mg Intramuscular Q6H PRN    LORazepam (ATIVAN) tablet 1 mg  1 mg Oral Q6H PRN    magnesium hydroxide (MILK OF MAGNESIA) oral suspension 30 mL  30 mL Oral Daily PRN    OLANZapine (ZyPREXA) IM injection 10 mg  10 mg Intramuscular Q8H PRN    OLANZapine (ZyPREXA) tablet 10 mg  10 mg Oral Q8H PRN    OLANZapine (ZyPREXA) tablet 5 mg  5 mg Oral HS    pantoprazole (PROTONIX) EC tablet 20 mg  20 mg Oral Early Morning    prazosin (MINIPRESS) capsule 1 mg  1 mg Oral HS    risperiDONE (RisperDAL M-TABS) dispersible tablet 1 mg  1 mg Oral Q3H PRN    senna (SENOKOT) tablet 8 6 mg  1 tablet Oral BID PRN    traZODone (DESYREL) tablet 50 mg  50 mg Oral HS PRN    ziprasidone (GEODON) capsule 40 mg  40 mg Oral BID With Meals     Vitals:  Vitals:    01/11/21 0818   BP: 115/60   Pulse: 77   Resp: 18   Temp: (!) 97 3 °F (36 3 °C)   SpO2:        Laboratory results:  I have personally reviewed all pertinent laboratory/tests results  Mental Status Evaluation:  Appearance:  age appropriate and casually dressed   Behavior:  guarded and restless and fidgety   Speech:  soft   Mood:  anxious and depressed   Affect:  constricted, flat and labile   Language Appropriate   Thought Process:  concrete, goal directed, logical and perserverative   Thought Content:  normal   Perceptual Disturbances:  Auditory hallucinations with commands to kill self   Risk Potential: Suicidal Ideations none, Homicidal Ideations none and Potential for Aggression No   Sensorium:  person, place, time/date, situation, day of week, month of year and year   Cognition:  recent and remote memory grossly intact   Consciousness: alert    Attention: attention span appeared shorter than expected for age   Insight:  limited   Judgment: limited   Gait/Station: normal gait/station   Motor Activity: no abnormal movements     Progress Toward Goals: Progressing    Recommended Treatment: Continue with pharmacotherapy, group therapy, milieu therapy and occupational therapy      1  Increase geodon to 40 mg BID

## 2021-01-11 NOTE — PROGRESS NOTES
Pt continues to report having CAH to harm self however pt attempting to ignore these voices by distractions  Pt reports SI however denies intent and verbally contracts for safety  Pt remains social and pleasant in the milieu with peers  Watching television and states enjoying attending groups  States prn medication she received earlier did help to decrease these voices and are not as loud currently  Calm and cooperative

## 2021-01-11 NOTE — PROGRESS NOTES
Pt reports having CAH to hurt self, states having SI however no intent to harm self and approached staff stating voices were loud and she wanted to take prn medication to help decrease AH  Encouraged to medication window to receive prn  Denies HI, VH  Pt attending groups throughout the day  Cooperative in conversation

## 2021-01-11 NOTE — PLAN OF CARE
Problem: Alteration in Thoughts and Perception  Goal: Verbalize thoughts and feelings  Description: Interventions:  - Promote a nonjudgmental and trusting relationship with the patient through active listening and therapeutic communication  - Assess patient's level of functioning, behavior and potential for risk  - Engage patient in 1 on 1 interactions  - Encourage patient to express fears, feelings, frustrations, and discuss symptoms    - [place] patient to reality, help patient recognize reality-based thinking   - Administer medications as ordered and assess for potential side effects  - Provide the patient education related to the signs and symptoms of the illness and desired effects of prescribed medications  Outcome: Progressing  Goal: Refrain from acting on delusional thinking/internal stimuli  Description: Interventions:  - Monitor patient closely, per order   - Utilize least restrictive measures   - Set reasonable limits, give positive feedback for acceptable   - Administer medications as ordered and monitor of potential side effects  Outcome: Progressing     Problem: Depression  Goal: Refrain from harming self  Description: Interventions:  - Monitor patient closely, per order   - Supervise medication ingestion, monitor effects and side effects   Outcome: Progressing  Goal: Refrain from isolation  Description: Interventions:  - Develop a trusting relationship   - Encourage socialization   Outcome: Completed  Goal: Refrain from self-neglect  Outcome: Progressing  Goal: Attend and participate in unit activities, including therapeutic, recreational, and educational groups  Description: Interventions:  - Provide therapeutic and educational activities daily, encourage attendance and participation, and document same in the medical record   Outcome: Progressing  Goal: Complete daily ADLs, including personal hygiene independently, as able  Description: Interventions:  - Observe, teach, and assist patient with ADLS  -  Monitor and promote a balance of rest/activity, with adequate nutrition and elimination   Outcome: Progressing     Problem: Anxiety  Goal: Anxiety is at manageable level  Description: Interventions:  - Assess and monitor patient's anxiety level  - Monitor for signs and symptoms (heart palpitations, chest pain, shortness of breath, headaches, nausea, feeling jumpy, restlessness, irritable, apprehensive)  - Collaborate with interdisciplinary team and initiate plan and interventions as ordered  - [place] patient to unit/surroundings  - Explain treatment plan  - Encourage participation in care  - Encourage verbalization of concerns/fears  - Identify coping mechanisms  - Assist in developing anxiety-reducing skills  - Administer/offer alternative therapies  - Limit or eliminate stimulants  Outcome: Progressing     Problem: SELF HARM/SUICIDALITY  Goal: Will have no self-injury during hospital stay  Description: INTERVENTIONS:  - Q 15 MINUTES: Routine safety checks  - Q WAKING SHIFT & PRN: Assess risk to determine if routine checks are adequate to maintain patient safety  - Encourage patient to participate actively in care by formulating a plan to combat response to suicidal ideation, identify supports and resources  Outcome: Progressing     Problem: Nutrition/Hydration-ADULT  Goal: Nutrient/Hydration intake appropriate for improving, restoring or maintaining nutritional needs  Description: Monitor and assess patient's nutrition/hydration status for malnutrition  Collaborate with interdisciplinary team and initiate plan and interventions as ordered  Monitor patient's weight and dietary intake as ordered or per policy  Utilize nutrition screening tool and intervene as necessary  Determine patient's food preferences and provide high-protein, high-caloric foods as appropriate       INTERVENTIONS:  - Monitor oral intake, urinary output, labs, and treatment plans  - Assess nutrition and hydration status and recommend course of action  - Evaluate amount of meals eaten  - Assist patient with eating if necessary   - Allow adequate time for meals  - Recommend/ encourage appropriate diets, oral nutritional supplements, and vitamin/mineral supplements  - Order, calculate, and assess calorie counts as needed  - Recommend, monitor, and adjust tube feedings and TPN/PPN based on assessed needs  - Assess need for intravenous fluids  - Provide specific nutrition/hydration education as appropriate  - Include patient/family/caregiver in decisions related to nutrition  Outcome: Progressing

## 2021-01-11 NOTE — PROGRESS NOTES
01/11/21 0915 01/11/21 1000 01/11/21 1145   Activity/Group Checklist   Group Admission/Discharge  (self assessment) Community meeting  (positive affirmations/goal setting) Nursing Education   Attendance Attended Attended Attended   Attendance Duration (min) 16-30 31-45 16-30   Interactions Interacted appropriately Interacted appropriately  (engaged in interactions with prompting) Interacted appropriately   Affect/Mood Appropriate Appropriate Appropriate   Goals Achieved Identified feelings; Identified triggers; Discussed coping strategies Able to engage in interactions; Able to listen to others  --       01/11/21 1315   Activity/Group Checklist   Group Life Skills  ([place] of control)   Attendance Attended   Attendance Duration (min) 46-60   Interactions Interacted appropriately  (engaged more frequenlty in group interactions)   Affect/Mood Appropriate   Goals Achieved Able to listen to others; Able to engage in interactions; Identified feelings; Discussed coping strategies; Able to reflect/comment on own behavior;Able to manage/cope with feelings   Pt attended 4/4 therapeutic groups throughout the day  She presented as pleasant and cooperative in group interactions  Pt identified wordsearch puzzles and music as preferred activities for positive coping

## 2021-01-11 NOTE — NURSING NOTE
Pt showered  Staff assisted with dressing as patient reports having difficulty bending her back  Patient appreciative, cooperative, pleasant with staff

## 2021-01-11 NOTE — PROGRESS NOTES
Treatment Plan Meeting:  Pt declined to meet with Treatment Team at this time; CM will review plan  Diagnosis of Bipolar Disorder & PTSD reviewed  Discussed short term goals for decrease in depression, anxiety, paranoid thoughts, psychotic symptoms, & decrease in suicidal thoughts  Pt resides in a [facility] through [address] will return there at discharge  Pt has had multiple hospitalizations & reports she wants a new place to live  At this time, no discharge date is set  All parties in agreement & treatment plan was signed       01/11/21 1495   Team Meeting   Meeting Type Tx Team Meeting   Initial Conference Date 01/11/21   Next Conference Date 01/17/21   Team Members Present   Team Members Present Physician;Nurse;   Physician Team Member Dr Ania Danielle Team Member DANDRE [facility] Management Team Member Eldon   Patient/Family Present   Patient Present No   Patient's Family Present No

## 2021-01-11 NOTE — PROGRESS NOTES
Patient approached staff to show her arm which patient wrote on with her pen stating "Suicidal, kill myself " pt states "This is what the voices are telling me  I dont want to hurt myself but I just wanted you to know what they say to me " pt verbally contracts for safety  Asked staff to help pt wash the writing off  Pt listened to the radio for some times which pt states help distract her from the voices  After some time, pt went to dayroom to watch television  States music and television has been effective with distraction

## 2021-01-11 NOTE — PROGRESS NOTES
Med note: PRN for anxiety  Pt received Ativan 1 mg PO @1758 for severe anxiety  Smith score 27  Upon follow up, pt resting and watching TV  Reports still being anxious  Pt encouraged to use positive coping skills  Medication mildly effective

## 2021-01-12 PROCEDURE — 99232 SBSQ HOSP IP/OBS MODERATE 35: CPT | Performed by: STUDENT IN AN ORGANIZED HEALTH CARE EDUCATION/TRAINING PROGRAM

## 2021-01-12 RX ADMIN — FLUOXETINE 40 MG: 20 CAPSULE ORAL at 08:53

## 2021-01-12 RX ADMIN — PRAZOSIN HYDROCHLORIDE 1 MG: 1 CAPSULE ORAL at 21:30

## 2021-01-12 RX ADMIN — OLANZAPINE 5 MG: 5 TABLET, FILM COATED ORAL at 21:30

## 2021-01-12 RX ADMIN — DIVALPROEX SODIUM 500 MG: 500 TABLET, DELAYED RELEASE ORAL at 08:53

## 2021-01-12 RX ADMIN — ATORVASTATIN CALCIUM 40 MG: 40 TABLET, FILM COATED ORAL at 16:31

## 2021-01-12 RX ADMIN — ZIPRASIDONE HYDROCHLORIDE 40 MG: 40 CAPSULE ORAL at 16:31

## 2021-01-12 RX ADMIN — ZIPRASIDONE HYDROCHLORIDE 40 MG: 40 CAPSULE ORAL at 08:53

## 2021-01-12 RX ADMIN — PANTOPRAZOLE SODIUM 20 MG: 20 TABLET, DELAYED RELEASE ORAL at 06:44

## 2021-01-12 RX ADMIN — ASPIRIN 81 MG: 81 TABLET, COATED ORAL at 08:53

## 2021-01-12 RX ADMIN — DIVALPROEX SODIUM 500 MG: 500 TABLET, DELAYED RELEASE ORAL at 21:30

## 2021-01-12 NOTE — PROGRESS NOTES
Patient denies present SI, however reports "I hear voices though and they tell me to hurt myself, but I do not have a plan "  Pt CFS  OOB and social with peers, sitting in dayroom

## 2021-01-12 NOTE — PLAN OF CARE
Problem: Alteration in Thoughts and Perception  Goal: Verbalize thoughts and feelings  Description: Interventions:  - Promote a nonjudgmental and trusting relationship with the patient through active listening and therapeutic communication  - Assess patient's level of functioning, behavior and potential for risk  - Engage patient in 1 on 1 interactions  - Encourage patient to express fears, feelings, frustrations, and discuss symptoms    - [place] patient to reality, help patient recognize reality-based thinking   - Administer medications as ordered and assess for potential side effects  - Provide the patient education related to the signs and symptoms of the illness and desired effects of prescribed medications  Outcome: Progressing  Goal: Refrain from acting on delusional thinking/internal stimuli  Description: Interventions:  - Monitor patient closely, per order   - Utilize least restrictive measures   - Set reasonable limits, give positive feedback for acceptable   - Administer medications as ordered and monitor of potential side effects  Outcome: Progressing     Problem: Depression  Goal: Refrain from harming self  Description: Interventions:  - Monitor patient closely, per order   - Supervise medication ingestion, monitor effects and side effects   Outcome: Progressing  Goal: Refrain from self-neglect  Outcome: Progressing  Goal: Attend and participate in unit activities, including therapeutic, recreational, and educational groups  Description: Interventions:  - Provide therapeutic and educational activities daily, encourage attendance and participation, and document same in the medical record   Outcome: Progressing  Goal: Complete daily ADLs, including personal hygiene independently, as able  Description: Interventions:  - Observe, teach, and assist patient with ADLS  -  Monitor and promote a balance of rest/activity, with adequate nutrition and elimination   Outcome: Progressing     Problem: Anxiety  Goal: Anxiety is at manageable level  Description: Interventions:  - Assess and monitor patient's anxiety level  - Monitor for signs and symptoms (heart palpitations, chest pain, shortness of breath, headaches, nausea, feeling jumpy, restlessness, irritable, apprehensive)  - Collaborate with interdisciplinary team and initiate plan and interventions as ordered    - [place] patient to unit/surroundings  - Explain treatment plan  - Encourage participation in care  - Encourage verbalization of concerns/fears  - Identify coping mechanisms  - Assist in developing anxiety-reducing skills  - Administer/offer alternative therapies  - Limit or eliminate stimulants  Outcome: Progressing     Problem: SELF HARM/SUICIDALITY  Goal: Will have no self-injury during hospital stay  Description: INTERVENTIONS:  - Q 15 MINUTES: Routine safety checks  - Q WAKING SHIFT & PRN: Assess risk to determine if routine checks are adequate to maintain patient safety  - Encourage patient to participate actively in care by formulating a plan to combat response to suicidal ideation, identify supports and resources  Outcome: Progressing     Problem: DISCHARGE PLANNING  Goal: Discharge to home or other facility with appropriate resources  Description: INTERVENTIONS:  - Identify barriers to discharge w/patient and caregiver  - Arrange for needed discharge resources and transportation as appropriate  - Identify discharge learning needs (meds, wound care, etc )  - Arrange for interpretive services to assist at discharge as needed  - Refer to Case Management Department for coordinating discharge planning if the patient needs post-hospital services based on physician/advanced practitioner order or complex needs related to functional status, cognitive ability, or social support system  Outcome: Progressing     Problem: Ineffective Coping  Goal: Participates in unit activities  Description: Interventions:  - Provide therapeutic environment   - Provide required programming   - Redirect inappropriate behaviors   Outcome: Progressing     Problem: Nutrition/Hydration-ADULT  Goal: Nutrient/Hydration intake appropriate for improving, restoring or maintaining nutritional needs  Description: Monitor and assess patient's nutrition/hydration status for malnutrition  Collaborate with interdisciplinary team and initiate plan and interventions as ordered  Monitor patient's weight and dietary intake as ordered or per policy  Utilize nutrition screening tool and intervene as necessary  Determine patient's food preferences and provide high-protein, high-caloric foods as appropriate       INTERVENTIONS:  - Monitor oral intake, urinary output, labs, and treatment plans  - Assess nutrition and hydration status and recommend course of action  - Evaluate amount of meals eaten  - Assist patient with eating if necessary   - Allow adequate time for meals  - Recommend/ encourage appropriate diets, oral nutritional supplements, and vitamin/mineral supplements  - Order, calculate, and assess calorie counts as needed  - Recommend, monitor, and adjust tube feedings and TPN/PPN based on assessed needs  - Assess need for intravenous fluids  - Provide specific nutrition/hydration education as appropriate  - Include patient/family/caregiver in decisions related to nutrition  Outcome: Progressing

## 2021-01-12 NOTE — PROGRESS NOTES
Progress Note - Behavioral Health   Leydi Cherrie Schooling 53 y o  female MRN: [id number]  Unit/Bed#: [facility] 254-02 Encounter: [id number]    Assessment/Plan   Principal Problem:    Bipolar disorder ([address] )  Active Problems:    Medical clearance for psychiatric admission    Essential hypertension    Hyperlipidemia    Class 3 severe obesity due to excess calories without serious comorbidity in [facility])    Chronic midline low back pain without sciatica    Bacterial conjunctivitis of both eyes    PTSD (post-traumatic stress disorder)      Subjective: Patient was seen, chart reviewed and case discussed with team  As per report patient received PRN medications for voices  Patient appears withdrawn, sad and internally preoccupied  Reports depressed mood and voices telling to hurt herself  Denies any intent or plan, feels safe in the hospital  She is complaint with medications with out side effects     Psychiatric Review of Systems:  Behavior over the last 24 hours:  unchanged  Sleep: normal  Appetite: normal  Medication side effects: No  ROS: no complaints, all others negative    Current Medications:  Current Facility-Administered Medications   Medication Dose Route Frequency    acetaminophen (TYLENOL) tablet 325 mg  325 mg Oral Q6H PRN    acetaminophen (TYLENOL) tablet 650 mg  650 mg Oral Q4H PRN    aluminum-magnesium hydroxide-simethicone (MYLANTA) oral suspension 30 mL  30 mL Oral Q4H PRN    aspirin (ECOTRIN LOW STRENGTH) EC tablet 81 mg  81 mg Oral Daily    atorvastatin (LIPITOR) tablet 40 mg  40 mg Oral Daily With Dinner    benztropine (COGENTIN) injection 1 mg  1 mg Intramuscular Q6H PRN    benztropine (COGENTIN) tablet 1 mg  1 mg Oral Q6H PRN    divalproex sodium (DEPAKOTE) EC tablet 500 mg  500 mg Oral Q12H FEDE    erythromycin (ILOTYCIN) 0 5 % ophthalmic ointment 0 5 inch  0 5 inch Both Eyes Q6H [address]    FLUoxetine (PROzac) capsule 40 mg  40 mg Oral Daily    haloperidol (HALDOL) tablet 5 mg  5 mg Oral Q6H PRN    haloperidol lactate (HALDOL) injection 5 mg  5 mg Intramuscular Q6H PRN    hydrOXYzine HCL (ATARAX) tablet 25 mg  25 mg Oral Q6H PRN    hydrOXYzine HCL (ATARAX) tablet 50 mg  50 mg Oral Q6H PRN    ibuprofen (MOTRIN) tablet 600 mg  600 mg Oral Q6H PRN    lidocaine (LIDODERM) 5 % patch 1 patch  1 patch Topical Daily    LORazepam (ATIVAN) injection 2 mg  2 mg Intramuscular Q6H PRN    LORazepam (ATIVAN) tablet 1 mg  1 mg Oral Q6H PRN    magnesium hydroxide (MILK OF MAGNESIA) oral suspension 30 mL  30 mL Oral Daily PRN    OLANZapine (ZyPREXA) IM injection 10 mg  10 mg Intramuscular Q8H PRN    OLANZapine (ZyPREXA) tablet 10 mg  10 mg Oral Q8H PRN    OLANZapine (ZyPREXA) tablet 5 mg  5 mg Oral HS    pantoprazole (PROTONIX) EC tablet 20 mg  20 mg Oral Early Morning    prazosin (MINIPRESS) capsule 1 mg  1 mg Oral HS    risperiDONE (RisperDAL M-TABS) dispersible tablet 1 mg  1 mg Oral Q3H PRN    senna (SENOKOT) tablet 8 6 mg  1 tablet Oral BID PRN    traZODone (DESYREL) tablet 50 mg  50 mg Oral HS PRN    ziprasidone (GEODON) capsule 40 mg  40 mg Oral BID With Meals       Behavioral Health Medications:   all current active meds have been reviewed, continue current psychiatric medications and current meds:   Current Facility-Administered Medications   Medication Dose Route Frequency    acetaminophen (TYLENOL) tablet 325 mg  325 mg Oral Q6H PRN    acetaminophen (TYLENOL) tablet 650 mg  650 mg Oral Q4H PRN    aluminum-magnesium hydroxide-simethicone (MYLANTA) oral suspension 30 mL  30 mL Oral Q4H PRN    aspirin (ECOTRIN LOW STRENGTH) EC tablet 81 mg  81 mg Oral Daily    atorvastatin (LIPITOR) tablet 40 mg  40 mg Oral Daily With Dinner    benztropine (COGENTIN) injection 1 mg  1 mg Intramuscular Q6H PRN    benztropine (COGENTIN) tablet 1 mg  1 mg Oral Q6H PRN    divalproex sodium (DEPAKOTE) EC tablet 500 mg  500 mg Oral Q12H [address]    erythromycin (ILOTYCIN) 0 5 % ophthalmic ointment 0 5 inch  0 5 inch Both Eyes Q6H [facility]    FLUoxetine (PROzac) capsule 40 mg  40 mg Oral Daily    haloperidol (HALDOL) tablet 5 mg  5 mg Oral Q6H PRN    haloperidol lactate (HALDOL) injection 5 mg  5 mg Intramuscular Q6H PRN    hydrOXYzine HCL (ATARAX) tablet 25 mg  25 mg Oral Q6H PRN    hydrOXYzine HCL (ATARAX) tablet 50 mg  50 mg Oral Q6H PRN    ibuprofen (MOTRIN) tablet 600 mg  600 mg Oral Q6H PRN    lidocaine (LIDODERM) 5 % patch 1 patch  1 patch Topical Daily    LORazepam (ATIVAN) injection 2 mg  2 mg Intramuscular Q6H PRN    LORazepam (ATIVAN) tablet 1 mg  1 mg Oral Q6H PRN    magnesium hydroxide (MILK OF MAGNESIA) oral suspension 30 mL  30 mL Oral Daily PRN    OLANZapine (ZyPREXA) IM injection 10 mg  10 mg Intramuscular Q8H PRN    OLANZapine (ZyPREXA) tablet 10 mg  10 mg Oral Q8H PRN    OLANZapine (ZyPREXA) tablet 5 mg  5 mg Oral HS    pantoprazole (PROTONIX) EC tablet 20 mg  20 mg Oral Early Morning    prazosin (MINIPRESS) capsule 1 mg  1 mg Oral HS    risperiDONE (RisperDAL M-TABS) dispersible tablet 1 mg  1 mg Oral Q3H PRN    senna (SENOKOT) tablet 8 6 mg  1 tablet Oral BID PRN    traZODone (DESYREL) tablet 50 mg  50 mg Oral HS PRN    ziprasidone (GEODON) capsule 40 mg  40 mg Oral BID With Meals     Vitals:  Vitals:    01/12/21 0751   BP: 143/66   Pulse: 79   Resp: 18   Temp: 98 3 °F (36 8 °C)   SpO2:        Laboratory results:  I have personally reviewed all pertinent laboratory/tests results  Mental Status Evaluation:  Appearance:  age appropriate, casually dressed and obese   Behavior:  guarded   Speech:  soft   Mood:  anxious and depressed   Affect:  constricted   Language Appropriate   Thought Process:  concrete, goal directed and logical   Thought Content:  normal   Perceptual Disturbances:  Auditory hallucinations with commands to kill self   Risk Potential: Suicidal Ideations none, Homicidal Ideations none and Potential for Aggression No   Sensorium:  person, place, time/date, situation, day of week, month of year and year   Cognition:  recent and remote memory grossly intact   Consciousness:  alert    Attention: attention span appeared shorter than expected for age   Insight:  limited   Judgment: limited   Gait/Station: normal gait/station   Motor Activity: no abnormal movements     Progress Toward Goals: Progressing    Recommended Treatment: Continue with pharmacotherapy, group therapy, milieu therapy and occupational therapy      1  Increase geodon

## 2021-01-12 NOTE — PROGRESS NOTES
Patient napping often through the morning, denying any HI, AVH, but does report passive SI, however CFS

## 2021-01-13 PROCEDURE — 99232 SBSQ HOSP IP/OBS MODERATE 35: CPT | Performed by: PHYSICIAN ASSISTANT

## 2021-01-13 RX ADMIN — LIDOCAINE 1 PATCH: 50 PATCH TOPICAL at 09:23

## 2021-01-13 RX ADMIN — ZIPRASIDONE HYDROCHLORIDE 40 MG: 40 CAPSULE ORAL at 09:21

## 2021-01-13 RX ADMIN — DIVALPROEX SODIUM 500 MG: 500 TABLET, DELAYED RELEASE ORAL at 21:55

## 2021-01-13 RX ADMIN — HALOPERIDOL 5 MG: 5 TABLET ORAL at 10:52

## 2021-01-13 RX ADMIN — FLUOXETINE 40 MG: 20 CAPSULE ORAL at 09:22

## 2021-01-13 RX ADMIN — DIVALPROEX SODIUM 500 MG: 500 TABLET, DELAYED RELEASE ORAL at 09:22

## 2021-01-13 RX ADMIN — ASPIRIN 81 MG: 81 TABLET, COATED ORAL at 09:22

## 2021-01-13 RX ADMIN — PRAZOSIN HYDROCHLORIDE 1 MG: 1 CAPSULE ORAL at 21:55

## 2021-01-13 RX ADMIN — ZIPRASIDONE HYDROCHLORIDE 40 MG: 40 CAPSULE ORAL at 16:16

## 2021-01-13 RX ADMIN — ATORVASTATIN CALCIUM 40 MG: 40 TABLET, FILM COATED ORAL at 16:16

## 2021-01-13 RX ADMIN — OLANZAPINE 5 MG: 5 TABLET, FILM COATED ORAL at 21:55

## 2021-01-13 NOTE — DISCHARGE INSTR - APPOINTMENTS
Ken Rendon RN, our [employer], will be calling you after your discharge, on the phone number that you provided  She will be available as an additional support, if needed  If you wish to speak with her, you may contact Cady Godinez at [phone]

## 2021-01-13 NOTE — PROGRESS NOTES
Pt OOB and visible on unit throughout the morning  On interaction, pt reports worsening CAH  Pt appears comfortable and smiling and watching tv  Pt states she will see med RN for PRN  Pt reports poor sleep d/t AH however per staff pt appears to sleep all night

## 2021-01-13 NOTE — CASE MANAGEMENT
CM contacted Maricarmen Zimmerman @ [phone] & was told to call [employer] @ [phone]  CM called & spoke to Brandi loomis, who said that CM would need to call [phone]  CM called this number & spoke with Grace Alcantar, who confirmed he was working at [address] [place] [address], where Pt was prior to his admission  He reported it is a temporary set-up for individuals that are hospitalized, so they can be quarantined before returning to their typical placements at [facility] or [facility]  He said that quarantine is typically 14 days, or if they were tested negative, it may be 10 days  Avinash Collins confirmed that they do use [employer]  Avinash Collisn said that he does not have information on Pt & that her assigned care coordinator at [facility] would be able to provide more history  He said that he didn't know who she worked with & provided the [phone] Prasanna Monae [phone], [facility] 128 [id number]  He also gave the emergency number for [facility]: [phone]  CM met with Pt who report she was doing okay; she was resting in bed  Pt had no questions for CM at this time

## 2021-01-13 NOTE — PROGRESS NOTES
01/13/21 1000 01/13/21 1315 01/13/21 1400   Activity/Group Checklist   Group Community meeting  (Self-compassion/goal setting) Life Skills  (positive self talk) Other (Comment)  (creative leisure)   Attendance Attended Attended Attended   Attendance Duration (min) 31-45 31-45 31-45   Interactions Interacted appropriately Interacted appropriately  (engaged spontaneously in group interactions) Interacted appropriately   Affect/Mood Appropriate Appropriate  (appears distracted at times, but easily redirected) Appropriate   Goals Achieved Discussed coping strategies; Able to listen to others; Able to engage in interactions Able to listen to others; Able to engage in interactions; Able to reflect/comment on own behavior;Able to manage/cope with feelings; Discussed coping strategies; Identified feelings Able to engage in interactions   Pt attended 3/3 therapeutic groups today  She verbalized struggling with negative thoughts but wanted to attend groups for distraction and to learn positive coping skills  Pt engaged more spontaneously in group interactions and offered encouraging feedback to peers during creative leisure group  Pt verbalized wordsearch puzzles and the radio as positive coping skills to manage negative thoughts

## 2021-01-13 NOTE — PROGRESS NOTES
Progress Note - Behavioral Health   Leydi Varma 53 y o  female MRN: [id number]  Unit/Bed#: [facility] 254-02 Encounter: [id number]    Assessment/Plan   Principal Problem:    Bipolar disorder ([address] )  Active Problems:    Medical clearance for psychiatric admission    Essential hypertension    Hyperlipidemia    Class 3 severe obesity due to excess calories without serious comorbidity in [facility])    Chronic midline low back pain without sciatica    Bacterial conjunctivitis of both eyes    PTSD (post-traumatic stress disorder)      Subjective: Patient was seen, chart reviewed and case discussed with team   Patient today reports that she remains depressed but appears concrete in thought process  Reports she has most depressive symptoms listed for major depressive disorder  Did not elaborate  Reports low energy, poor sleep, poor appetite, hopelessness, worthlessness, and suicidal thoughts  Contracts for safety  Denies any plan  Not currently manic or agitated  Remains with chronic auditory hallucinations of voices that command her to kill herself  Denied other forms of hallucinations  Did not appear paranoid  Medication compliant  Appears to be tolerating medications well without serious side effects        Psychiatric Review of Systems:  Behavior over the last 24 hours:  unchanged  Sleep: insomnia  Appetite: poor  Medication side effects: No  ROS: no complaints, all others negative    Current Medications:  Current Facility-Administered Medications   Medication Dose Route Frequency    acetaminophen (TYLENOL) tablet 325 mg  325 mg Oral Q6H PRN    acetaminophen (TYLENOL) tablet 650 mg  650 mg Oral Q4H PRN    aluminum-magnesium hydroxide-simethicone (MYLANTA) oral suspension 30 mL  30 mL Oral Q4H PRN    aspirin (ECOTRIN LOW STRENGTH) EC tablet 81 mg  81 mg Oral Daily    atorvastatin (LIPITOR) tablet 40 mg  40 mg Oral Daily With Dinner    benztropine (COGENTIN) injection 1 mg  1 mg Intramuscular Q6H PRN    benztropine (COGENTIN) tablet 1 mg  1 mg Oral Q6H PRN    divalproex sodium (DEPAKOTE) EC tablet 500 mg  500 mg Oral Q12H [facility]    erythromycin (ILOTYCIN) 0 5 % ophthalmic ointment 0 5 inch  0 5 inch Both Eyes Q6H [facility]    FLUoxetine (PROzac) capsule 40 mg  40 mg Oral Daily    haloperidol (HALDOL) tablet 5 mg  5 mg Oral Q6H PRN    haloperidol lactate (HALDOL) injection 5 mg  5 mg Intramuscular Q6H PRN    hydrOXYzine HCL (ATARAX) tablet 25 mg  25 mg Oral Q6H PRN    hydrOXYzine HCL (ATARAX) tablet 50 mg  50 mg Oral Q6H PRN    ibuprofen (MOTRIN) tablet 600 mg  600 mg Oral Q6H PRN    lidocaine (LIDODERM) 5 % patch 1 patch  1 patch Topical Daily    LORazepam (ATIVAN) injection 2 mg  2 mg Intramuscular Q6H PRN    LORazepam (ATIVAN) tablet 1 mg  1 mg Oral Q6H PRN    magnesium hydroxide (MILK OF MAGNESIA) oral suspension 30 mL  30 mL Oral Daily PRN    OLANZapine (ZyPREXA) IM injection 10 mg  10 mg Intramuscular Q8H PRN    OLANZapine (ZyPREXA) tablet 10 mg  10 mg Oral Q8H PRN    OLANZapine (ZyPREXA) tablet 5 mg  5 mg Oral HS    pantoprazole (PROTONIX) EC tablet 20 mg  20 mg Oral Early Morning    prazosin (MINIPRESS) capsule 1 mg  1 mg Oral HS    risperiDONE (RisperDAL M-TABS) dispersible tablet 1 mg  1 mg Oral Q3H PRN    senna (SENOKOT) tablet 8 6 mg  1 tablet Oral BID PRN    traZODone (DESYREL) tablet 50 mg  50 mg Oral HS PRN    ziprasidone (GEODON) capsule 40 mg  40 mg Oral BID With Meals       Behavioral Health Medications: all current active meds have been reviewed and continue current psychiatric medications  Vitals:  Vitals:    01/13/21 0731   BP: 113/55   Pulse: 74   Resp: 18   Temp: 98 2 °F (36 8 °C)   SpO2:        Laboratory results:    I have personally reviewed all pertinent laboratory/tests results    Most Recent Labs:   Lab Results   Component Value Date    WBC 6 98 01/10/2021    RBC 3 70 (L) 01/10/2021    HGB 11 6 01/10/2021    HCT 35 8 01/10/2021     01/10/2021    RDW 14 5 01/10/2021 NEUTROABS 4 09 01/10/2021    SODIUM 138 01/10/2021    K 4 1 01/10/2021     01/10/2021    CO2 31 01/10/2021    BUN 16 01/10/2021    CREATININE 0 62 01/10/2021    GLUC 107 01/10/2021    GLUF 107 (H) 01/10/2021    CALCIUM 8 7 01/10/2021    AST 11 01/10/2021    ALT 23 01/10/2021    ALKPHOS 77 01/10/2021    TP 6 6 01/10/2021    ALB 3 1 (L) 01/10/2021    TBILI 0 60 01/10/2021    VALPROICTOT 48 (L) 01/10/2021    [id number] 3 640 01/10/2021    PREGSERUM Negative 01/10/2021    RPR Non-Reactive 01/10/2021       Mental Status Evaluation:  Appearance:  casually dressed   Behavior:  guarded but cooperative   Speech:  normal pitch and normal volume   Mood:  depressed   Affect:  increased in range   Language Appropriate   Thought Process:  concrete and goal directed   Thought Content:  paranoia   Perceptual Disturbances: chronic AH of voices with commands   Risk Potential: Suicidal ideation without plan  Denied HI  Potential for aggression: No   Sensorium:  person, place and time/date   Cognition:  recent and remote memory grossly intact   Consciousness:  alert and awake    Attention: attention span appeared shorter than expected for age   Insight:  limited   Judgment: limited   Gait/Station: normal gait/station and normal balance   Motor Activity: no abnormal movements     Progress Toward Goals: progressing slowly    Recommended Treatment: Continue with pharmacotherapy, group therapy, milieu therapy and occupational therapy  1   Continue current medications  2  No discharge date at this time    Risks, benefits and possible side effects of Medications:   Risks, benefits, and possible side effects of medications explained to patient and patient verbalizes understanding

## 2021-01-13 NOTE — PROGRESS NOTES
Pt is visible in the milieu and is cooperative and pleasant in conversation  Pt reports fleeting SI with no plan  Pt reports that she is watching television as a way to distract her mind from thinking negative thoughts  Pt denies HI/AVH  Denies having any other concerns or questions

## 2021-01-13 NOTE — CASE MANAGEMENT
CM met with Pt, who initially reported she was "good" but then said she was getting worse  Pt said that she likes listening to country music & that helps; Pt was utilizing the radio  Pt said that she only had one outfit, which staff did wash for her last night & she plans to shower & put on her clothing this afternoon  Pt asked that CM have staff bring in clothing & her shampoo & conditioner  [facility] [address]  Nany Desir @ [phone] & left a messages on the following extensions: Gera [phone], Yesi Mcfadden [phone], & Marylu Ohara [phone]  CM contacted Rudy Zuleta at [phone] & his voicemail said that for immediate assistance to contact [employer] at [phone]  CM called & he reported that Nuris Bunch should be the point of contact & provided cell phone number [phone]; he is currently on the phone & will call when he is available  CM contacted [address] [place] [address] @ [phone] & left a message to notify them of Pt's admission & request a return call to schedule follow-up appointments

## 2021-01-13 NOTE — DISCHARGE INSTR - OTHER ORDERS
[facility]  24/7: [phone]  Vicente  [address]    [facility] has 3 crisis centers:   [employer] at [facility] at [facility] at [facility] services are accessible by phone and through three [facility] hospital Emergency Rooms to individuals, families, and law enforcement  Psychiatric and treatment planning is available, providing for evaluation and disposition and the immediate start of medication and treatment  Substance abuse crisis issues are also addressed  Access to psychiatric consultation is limited to a few hours each weekday  These services are available 24/7 at [facility] at [facility] ([place] and [address]; [phone]) and [employer] at [facility] [address], [facility], [address]; [phone]  The most active crisis unit is [facility] at [address], [address] [facility]; [phone])  Suicide Prevention Lifeline  (816) 804-TALK or (141) Arielle 1165 (959) 840-LQHL    Crisis Text Line is free, 24/7 support for those in crisis  Text HOME to 528710 from anywhere in the [place] to text with a trained Crisis Counselor  Peer Recovery Warmline through [facility], hours of operation are 1:00 PM to 5:00 PM, Monday through Friday, except major holidays  [address] [phone] ·   [address], [place], [facility]  Provides free, anonymous and confidential telephone helpline services to help with severe mental health issues to financial concerns, family issues or simply a desire to be heard      What you need to know aboutcoronavirus disease 2019 (COVID-19)     What is coronavirus disease 2019 (COVID-19)? Coronavirus disease 2019 (COVID-19) is a respiratory illness that can spread from person to person  The virus that causes COVID-19 is a novel coronavirus that was first identified during an investigation into an outbreak in [place], [place]  [place] people in the [place]  get COVID[place]19? Yes  COVID-19 is spreading from person to person in parts of the [facility]  Risk of infection with COVID-19 is higher for people who are close contacts of someone known to have COVID-19, for example healthcare workers, or household members  Other people at higher risk for infection are those who live in or have recently been in an area with ongoing spread of COVID-19  Learn more about places with ongoing spread at   [facility]  html#geographic  Have there been cases of COVID-19 in the [place] ?   Yes  The first case of COVID-19 in the [place] was reported on January 21, 2020  The current count of cases of COVID-19 in the [place] is available on [employer] at [facility]  How does COVID-19 spread? The virus that causes COVID-19 probably emerged from an animal source, but is now spreading from person to person  The virus is thought to spread mainly between people who are in close contact with one another (within about 6 feet) through respiratory droplets produced when an infected person coughs or sneezes  It also may be possible that a person can get COVID-19 by touching a surface or object that has the virus on it and then touching their own mouth, nose, or possibly their eyes, but this is not thought to be the main way the virus spreads  Learn what is known about the spread of newly emerged coronaviruses at [facility]  What are the symptoms of COVID-19?   Patients with COVID-19 have had mild to severe respiratory illness with symptoms of   fever   cough   shortness of breath  What are severe complications from this virus? Some patients have pneumonia in both lungs, multi-organ failure and in some cases death  How can I help protect myself? People can help protect themselves from respiratory illness with everyday preventive actions  Avoid close contact with people who are sick  Avoid touching your eyes, nose, and mouth withunwashed hands  Wash your hands often with soap and water for at least 20 seconds  Use an alcohol-based hand  that contains at least 60% alcohol if soap and water are not available  If you are sick, to keep from spreading respiratory illness to others, you should   Stay home when you are sick  Cover your cough or sneeze with a tissue, then throw the tissue in the trash  Clean and disinfect frequently touched objectsand surfaces  What should I do if I recently traveled from an area with ongoing spread of COVID-19? If you have traveled from an affected area, there may be restrictions on your movements for up to 2 weeks  If you develop symptoms during that period (fever, cough, trouble breathing), seek medical advice  Call the office of your health care provider before you go, and tell them about your travel and your symptoms  They will give you instructions on how to get care without exposing other people to your illness  While sick, avoid contact with people, don't go out and delay any travel to reduce the possibility of spreading illness to others  Is there a vaccine? There is currently no vaccine to protect against COVID-19  The best way to prevent infection is to take everyday preventive actions, like avoiding close contact with people who are sick and washing your hands often  Is there a treatment? There is no specific antiviral treatment for COVID-19  People with COVID-19 can seek medical care to helprelieve symptoms    For more information: www cdc gov/WUDJK78QS 102310-M 03/03/2020       What to do if you are sick withcoronavirus disease 2019 (COVID-19)     If you are sick with COVID-19 or suspect you are infected with the virus that causes COVID-19, follow the steps below to help prevent the disease from spreading to people in your home and community  Stay home except to get medical care   You should restrict activities outside your home, except for getting medical care  Do not go to work, school, or public areas  Avoid using public transportation, ride-sharing, or taxis  Separate yourself from other people and animals inyour home  People: As much as possible, you should stay in a specific room and away from other people in your home  Also, you should use a separate bathroom, if available  Animals: Do not handle pets or other animals while sick  See COVID-19 and Animals for more information  Call ahead before visiting your doctor   If you have a medical appointment, call the healthcare provider and tell them that you have or may have COVID-19  This will help the healthcare provider's office take steps to keep other people from getting infected or exposed  Wear a facemask  You should wear a facemask when you are around other people (e g , sharing a room or vehicle) or pets and before you enter a healthcare provider's office  If you are not able to wear a facemask (for example, because it causes trouble breathing), then people who live with you should not stay in the same room with you, or they should wear a facemask if they enteryour room  Cover your coughs and sneezes   Cover your mouth and nose with a tissue when you cough or sneeze  Throw used tissues in a lined trash can; immediately wash your hands with soap and water for at least 20 seconds or clean your hands with an alcohol-based hand  that contains at least 60 to 95% alcohol, covering all surfaces of your hands and rubbing them together until they feel dry  Soap and water should be used preferentially if hands are visibly dirty    Avoid sharing personal household items   You should not share dishes, drinking glasses, cups, eating utensils, towels, or bedding with other people or pets in your home  After using these items, they should be washed thoroughly with soap and water  Clean your hands often  Wash your hands often with soap and water for at least 20 seconds  If soap and water are not available, clean your hands with an alcohol-based hand  that contains at least 60% alcohol, covering all surfaces of your hands and rubbing them together until they feel dry  Soap and water should be used preferentially if hands are visibly dirty  Avoid touching your eyes, nose, and mouth with unwashed hands  Clean all "high-touch" surfaces every day  High touch surfaces include counters, tabletops, doorknobs, bathroom fixtures, toilets, phones, keyboards, tablets, and bedside tables  Also, clean any surfaces that may have blood, stool, or body fluids on them  Use a household cleaning spray or wipe, according to the label instructions  Labels contain instructions for safe and effective use of the cleaning product including precautions you should take when applying the product, such as wearing gloves and making sure you have good ventilation during use of the product  Monitor your symptoms  Seek prompt medical attention if your illness is worsening (e g , difficulty breathing)  Before seeking care, call your healthcare provider and tell them that you have, or are being evaluated for, COVID-19  Put on a facemask before you enter the facility  These steps will help the healthcare provider's office to keep other people in the office or waiting room from getting infectedor exposed  Ask your healthcare provider to call the local or [facility] health department  Persons who are placed under active monitoring or facilitated self-monitoring should follow instructions provided by their local health department or occupational health professionals, as appropriate     If you have a medical emergency and need to call 911, notify the dispatch personnel that you have, or are being evaluated for COVID-19  If possible, put on a facemask before emergency medical services arrive  Discontinuing home isolation  Patients with confirmed COVID-19 should remain under home isolation precautions until the risk of secondary transmission to others is thought to be low  The decision to discontinue home isolation precautions should be made on a case-by-case basis, in consultation with healthcare providers and state and local health departments  For more information: www cdc gov/YCKIH25DN 155365-R 02/24/2020       Stay home when you are sick,except to get medical care  Wash your hands often with soap and water for at least 20 seconds  Cover your cough or sneeze with a tissue, then throw the tissue in the trash  Clean and disinfect frequently touched objects and surfaces  Avoid touching your eyes, nose, and mouth  STOP THE SPREAD OF GERMS  For more information: www cdc gov/COVID19 Avoid close contact with people who are sick  Help prevent the spread of respiratory diseases like COVID-19  What you need to know aboutcoronavirus disease 2019 (COVID-19)     What is coronavirus disease 2019 (COVID-19)? Coronavirus disease 2019 (COVID-19) is a respiratory illness that can spread from person to person  The virus that causes COVID-19 is a novel coronavirus that was first identified during an investigation into an outbreak in [place], [place]  Can people in the [place]  get COVID[place]19? Yes  COVID-19 is spreading from person to person in parts of the [place]  Risk of infection with COVID-19 is higher for people who are close contacts of someone known to have COVID-19, for example healthcare workers, or household members  Other people at higher risk for infection are those who live in or have recently been in an area with ongoing spread of COVID-19   Learn more about places with ongoing spread at [facility]  html#geographic  Have there been cases of COVID-19 in [place] ?   Yes  The first case of COVID-19 in the [place] was reported on January 21, 2020  The current count of cases of COVID-19 in the [place] is available on [employer] at [facility]  How does COVID-19 spread? The virus that causes COVID-19 probably emerged from an animal source, but is now spreading from person to person  The virus is thought to spread mainly between people who are in close contact with one another (within about 6 feet) through respiratory droplets produced when an infected person coughs or sneezes  It also may be possible that a person can get COVID-19 by touching a surface or object that has the virus on it and then touching their own mouth, nose, or possibly their eyes, but this is not thought to be the main way the virus spreads  Learn what is known about the spread of newly emerged coronaviruses at [facility]  What are the symptoms of COVID-19? Patients with COVID-19 have had mild to severe respiratory illness with symptoms of   fever   cough   shortness of breath  What are severe complications from this virus? Some patients have pneumonia in both lungs, multi-organ failure and in some cases death  How can I help protect myself? People can help protect themselves from respiratory illness with everyday preventive actions  Avoid close contact with people who are sick  Avoid touching your eyes, nose, and mouth withunwashed hands  Wash your hands often with soap and water for at least 20 seconds  Use an alcohol-based hand  that contains at least 60% alcohol if soap and water are not available  If you are sick, to keep from spreading respiratory illness to others, you should   Stay home when you are sick      Cover your cough or sneeze with a tissue, then throw the tissue in the trash  Clean and disinfect frequently touched objectsand surfaces  What should I do if I recently traveled from an area with ongoing spread of COVID-19? If you have traveled from an affected area, there may be restrictions on your movements for up to 2 weeks  If you develop symptoms during that period (fever, cough, trouble breathing), seek medical advice  Call the office of your health care provider before you go, and tell them about your travel and your symptoms  They will give you instructions on how to get care without exposing other people to your illness  While sick, avoid contact with people, don't go out and delay any travel to reduce the possibility of spreading illness to others  Is there a vaccine? There is currently no vaccine to protect against COVID-19  The best way to prevent infection is to take everyday preventive actions, like avoiding close contact with people who are sick and washing your hands often  Is there a treatment? There is no specific antiviral treatment for COVID-19  People with COVID-19 can seek medical care to helprelieve symptoms  For more information: www cdc gov/QWXLT50MB 044263-S 03/03/2020       What to do if you are sick withcoronavirus disease 2019 (COVID-19)     If you are sick with COVID-19 or suspect you are infected with the virus that causes COVID-19, follow the steps below to help prevent the disease from spreading to people in your home and community  Stay home except to get medical care   You should restrict activities outside your home, except for getting medical care  Do not go to work, school, or public areas  Avoid using public transportation, ride-sharing, or taxis  Separate yourself from other people and animals inyour home  People: As much as possible, you should stay in a specific room and away from other people in your home  Also, you should use a separate bathroom, if available    Animals: Do not handle pets or other animals while sick  See COVID-19 and Animals for more information  Call ahead before visiting your doctor   If you have a medical appointment, call the healthcare provider and tell them that you have or may have COVID-19  This will help the healthcare provider's office take steps to keep other people from getting infected or exposed  Wear a facemask  You should wear a facemask when you are around other people (e g , sharing a room or vehicle) or pets and before you enter a healthcare provider's office  If you are not able to wear a facemask (for example, because it causes trouble breathing), then people who live with you should not stay in the same room with you, or they should wear a facemask if they enteryour room  Cover your coughs and sneezes   Cover your mouth and nose with a tissue when you cough or sneeze  Throw used tissues in a lined trash can; immediately wash your hands with soap and water for at least 20 seconds or clean your hands with an alcohol-based hand  that contains at least 60 to 95% alcohol, covering all surfaces of your hands and rubbing them together until they feel dry  Soap and water should be used preferentially if hands are visibly dirty  Avoid sharing personal household items   You should not share dishes, drinking glasses, cups, eating utensils, towels, or bedding with other people or pets in your home  After using these items, they should be washed thoroughly with soap and water  Clean your hands often  Wash your hands often with soap and water for at least 20 seconds  If soap and water are not available, clean your hands with an alcohol-based hand  that contains at least 60% alcohol, covering all surfaces of your hands and rubbing them together until they feel dry  Soap and water should be used preferentially if hands are visibly dirty  Avoid touching your eyes, nose, and mouth with unwashed hands    Clean all "high-touch" surfaces every day  High touch surfaces include counters, tabletops, doorknobs, bathroom fixtures, toilets, phones, keyboards, tablets, and bedside tables  Also, clean any surfaces that may have blood, stool, or body fluids on them  Use a household cleaning spray or wipe, according to the label instructions  Labels contain instructions for safe and effective use of the cleaning product including precautions you should take when applying the product, such as wearing gloves and making sure you have good ventilation during use of the product  Monitor your symptoms  Seek prompt medical attention if your illness is worsening (e g , difficulty breathing)  Before seeking care, call your healthcare provider and tell them that you have, or are being evaluated for, COVID-19  Put on a facemask before you enter the facility  These steps will help the healthcare provider's office to keep other people in the office or waiting room from getting infectedor exposed  Ask your healthcare provider to call the local or state health department  Persons who are placed under active monitoring or facilitated self-monitoring should follow instructions provided by their local health department or occupational health professionals, as appropriate  If you have a medical emergency and need to call 911, notify the dispatch personnel that you have, or are being evaluated for COVID-19  If possible, put on a facemask before emergency medical services arrive  Discontinuing home isolation  Patients with confirmed COVID-19 should remain under home isolation precautions until the risk of secondary transmission to others is thought to be low  The decision to discontinue home isolation precautions should be made on a case-by-case basis, in consultation with healthcare providers and state and local health departments  For more information: www cdc gov/ZTRNE22MU 934548-C 02/24/2020       Stay home when you are sick,except to get medical care    Wash your hands often with soap and water for at least 20 seconds  Cover your cough or sneeze with a tissue, then throw the tissue in the trash  Clean and disinfect frequently touched objects and surfaces  Avoid touching your eyes, nose, and mouth  STOP THE SPREAD OF GERMS  For more information: www cdc gov/COVID19 Avoid close contact with people who are sick  Help prevent the spread of respiratory diseases like COVID-19

## 2021-01-13 NOTE — PLAN OF CARE
Problem: Alteration in Thoughts and Perception  Goal: Verbalize thoughts and feelings  Description: Interventions:  - Promote a nonjudgmental and trusting relationship with the patient through active listening and therapeutic communication  - Assess patient's level of functioning, behavior and potential for risk  - Engage patient in 1 on 1 interactions  - Encourage patient to express fears, feelings, frustrations, and discuss symptoms    - [place] patient to reality, help patient recognize reality-based thinking   - Administer medications as ordered and assess for potential side effects  - Provide the patient education related to the signs and symptoms of the illness and desired effects of prescribed medications  Outcome: Progressing  Goal: Refrain from acting on delusional thinking/internal stimuli  Description: Interventions:  - Monitor patient closely, per order   - Utilize least restrictive measures   - Set reasonable limits, give positive feedback for acceptable   - Administer medications as ordered and monitor of potential side effects  Outcome: Progressing     Problem: Depression  Goal: Refrain from harming self  Description: Interventions:  - Monitor patient closely, per order   - Supervise medication ingestion, monitor effects and side effects   Outcome: Progressing  Goal: Refrain from self-neglect  Outcome: Progressing  Goal: Attend and participate in unit activities, including therapeutic, recreational, and educational groups  Description: Interventions:  - Provide therapeutic and educational activities daily, encourage attendance and participation, and document same in the medical record   Outcome: Progressing  Goal: Complete daily ADLs, including personal hygiene independently, as able  Description: Interventions:  - Observe, teach, and assist patient with ADLS  -  Monitor and promote a balance of rest/activity, with adequate nutrition and elimination   Outcome: Progressing     Problem: Anxiety  Goal: Anxiety is at manageable level  Description: Interventions:  - Assess and monitor patient's anxiety level  - Monitor for signs and symptoms (heart palpitations, chest pain, shortness of breath, headaches, nausea, feeling jumpy, restlessness, irritable, apprehensive)  - Collaborate with interdisciplinary team and initiate plan and interventions as ordered  - [place] patient to unit/surroundings  - Explain treatment plan  - Encourage participation in care  - Encourage verbalization of concerns/fears  - Identify coping mechanisms  - Assist in developing anxiety-reducing skills  - Administer/offer alternative therapies  - Limit or eliminate stimulants  Outcome: Progressing     Problem: SELF HARM/SUICIDALITY  Goal: Will have no self-injury during hospital stay  Description: INTERVENTIONS:  - Q 15 MINUTES: Routine safety checks  - Q WAKING SHIFT & PRN: Assess risk to determine if routine checks are adequate to maintain patient safety  - Encourage patient to participate actively in care by formulating a plan to combat response to suicidal ideation, identify supports and resources  Outcome: Progressing     Problem: Nutrition/Hydration-ADULT  Goal: Nutrient/Hydration intake appropriate for improving, restoring or maintaining nutritional needs  Description: Monitor and assess patient's nutrition/hydration status for malnutrition  Collaborate with interdisciplinary team and initiate plan and interventions as ordered  Monitor patient's weight and dietary intake as ordered or per policy  Utilize nutrition screening tool and intervene as necessary  Determine patient's food preferences and provide high-protein, high-caloric foods as appropriate       INTERVENTIONS:  - Monitor oral intake, urinary output, labs, and treatment plans  - Assess nutrition and hydration status and recommend course of action  - Evaluate amount of meals eaten  - Assist patient with eating if necessary   - Allow adequate time for meals  - Recommend/ encourage appropriate diets, oral nutritional supplements, and vitamin/mineral supplements  - Order, calculate, and assess calorie counts as needed  - Recommend, monitor, and adjust tube feedings and TPN/PPN based on assessed needs  - Assess need for intravenous fluids  - Provide specific nutrition/hydration education as appropriate  - Include patient/family/caregiver in decisions related to nutrition  Outcome: Progressing

## 2021-01-13 NOTE — PROGRESS NOTES
Status: Pt reported CAH to cut herself  Pt redirectable & utilizes coping skills of writing/journaling, & listening to music  Pt came out in the evening & had written on her arm, "suicidal, kill myself"  Pt verbally CFS on the unit  Pt was assisted with washing it off & she eventually went to the day room to watch TV     Medication: no changes / PRN - Zyprexa & Ativan  D/C: next week     01/12/21 0750   Team Meeting   Meeting Type Daily Rounds   Team Members Present   Team Members Present Physician;Nurse;Occupational Therapist;   Physician Team Member Dr Abram Kim / Uilces Cough Team Member DAND[facility] Management Team Member Zoë Thornton / Maulik Trinidad   OT Team Member Nichol Rey / Izzy Hart   Patient/Family Present   Patient Present No   Patient's Family Present No

## 2021-01-13 NOTE — PROGRESS NOTES
Status: Pt refusing her ointment for eye  Pt reported having A/H & said she was distracting herself by watching TV  Pt did not attempt groups yesterday & was sleeping; she had attended the day before     Medication: no changes /   D/C:next week      01/13/21 0830   Team Meeting   Meeting Type Daily Rounds   Team Members Present   Team Members Present Physician;Nurse;Occupational Therapist;   Physician Team Member Dr Kushal Bowens / Judi West Team Member [facility] Management Team Member Rebecca Resendez / Donna Caceres   OT Team Member Slick Major / Merritt Clonts   Patient/Family Present   Patient Present No   Patient's Family Present No

## 2021-01-14 PROCEDURE — 99232 SBSQ HOSP IP/OBS MODERATE 35: CPT | Performed by: PHYSICIAN ASSISTANT

## 2021-01-14 RX ORDER — FLUOXETINE HYDROCHLORIDE 20 MG/1
60 CAPSULE ORAL DAILY
Status: DISCONTINUED | OUTPATIENT
Start: 2021-01-15 | End: 2021-01-18 | Stop reason: HOSPADM

## 2021-01-14 RX ADMIN — DIVALPROEX SODIUM 500 MG: 500 TABLET, DELAYED RELEASE ORAL at 09:25

## 2021-01-14 RX ADMIN — ZIPRASIDONE HYDROCHLORIDE 40 MG: 40 CAPSULE ORAL at 15:54

## 2021-01-14 RX ADMIN — ASPIRIN 81 MG: 81 TABLET, COATED ORAL at 09:25

## 2021-01-14 RX ADMIN — ATORVASTATIN CALCIUM 40 MG: 40 TABLET, FILM COATED ORAL at 15:54

## 2021-01-14 RX ADMIN — ZIPRASIDONE HYDROCHLORIDE 40 MG: 40 CAPSULE ORAL at 09:25

## 2021-01-14 RX ADMIN — PRAZOSIN HYDROCHLORIDE 1 MG: 1 CAPSULE ORAL at 21:49

## 2021-01-14 RX ADMIN — PANTOPRAZOLE SODIUM 20 MG: 20 TABLET, DELAYED RELEASE ORAL at 06:31

## 2021-01-14 RX ADMIN — OLANZAPINE 5 MG: 5 TABLET, FILM COATED ORAL at 21:48

## 2021-01-14 RX ADMIN — FLUOXETINE 40 MG: 20 CAPSULE ORAL at 09:25

## 2021-01-14 RX ADMIN — DIVALPROEX SODIUM 500 MG: 500 TABLET, DELAYED RELEASE ORAL at 21:49

## 2021-01-14 NOTE — PROGRESS NOTES
Pt reports feeling like she got a restless night of sleep overnight and continues to feel tired this morning  Declined to eat breakfast due to feeling tired and wanting to sleep more  Reports having SI with no plan-verbally contracts for safety, States having AH, command in nature stating to harm self however pt reports trying to ignore these voices  Spoke to patient about coping skills such as sound machine, music to assist with distraction  Pt denies any concerns/questions regarding prescribed medications

## 2021-01-14 NOTE — CASE MANAGEMENT
ROSE received a call from Bhupendra Hayes, care coordinator at [facility], who had received 's message yesterday  She reported that Pt had been at [facility] for only 1 day, before she was transferred to [facility] for her quarantine  She said that she believes that Pt had been at another [facility] facility for about a month prior, but she didn't have a lot of information  CM expressed her frustration in not being able to identify a point of contact for Pt through [facility] [employer]  CM reviewed that she had left a message for Sanya at [facility] requesting clothing, but nothing had been delivered yet  Bhupendra Hayes reported that [facility] & they would need to get things to the hospital      CM received a call from [address] reviewed that Bhupendra Hayes had said that he would be the one to contact regarding getting Pt some clothing & hygiene items to the hospital  He reported he didn't know what Pt had at their site but he would follow-up regarding this  Pt asked CM every time she saw her if she had an update regarding her clothing, & CM reviewed that she had spoken with different people at different locations throughout [facility], & was trying to establish who would bring items for Pt

## 2021-01-14 NOTE — PROGRESS NOTES
Progress Note - Behavioral Health   Leydi Andria Pugh 53 y o  female MRN: [id number]  Unit/Bed#: [facility] 254-02 Encounter: [id number]    Assessment/Plan   Principal Problem:    Bipolar disorder ([address] )  Active Problems:    Medical clearance for psychiatric admission    Essential hypertension    Hyperlipidemia    Class 3 severe obesity due to excess calories without serious comorbidity in [facility])    Chronic midline low back pain without sciatica    Bacterial conjunctivitis of both eyes    PTSD (post-traumatic stress disorder)      Subjective: Patient was seen, chart reviewed and case discussed with team   Patient today seclusive to her room not wanting to go to morning group  States she feels depressed and has suicidal thoughts without plan  Does contract for safety  States the voices tell her to kill herself  Does say she was suicidal unprovoked and appears that she has concrete thought process  Denied having any other form of hallucination  Did not appear paranoid  States she has low energy, lack of motivation, anhedonia, hopelessness, and hypersomnia  Did not appear paranoid  Denied PTSD related symptoms  No signs of otoniel or agitation  Medication compliant  Appears to be tolerating medications well without serious side effects        Psychiatric Review of Systems:  Behavior over the last 24 hours:  unchanged  Sleep: normal  Appetite: normal  Medication side effects: No  ROS: no complaints, all others negative    Current Medications:  Current Facility-Administered Medications   Medication Dose Route Frequency    acetaminophen (TYLENOL) tablet 325 mg  325 mg Oral Q6H PRN    acetaminophen (TYLENOL) tablet 650 mg  650 mg Oral Q4H PRN    aluminum-magnesium hydroxide-simethicone (MYLANTA) oral suspension 30 mL  30 mL Oral Q4H PRN    aspirin (ECOTRIN LOW STRENGTH) EC tablet 81 mg  81 mg Oral Daily    atorvastatin (LIPITOR) tablet 40 mg  40 mg Oral Daily With Dinner    benztropine (COGENTIN) injection 1 mg  1 mg Intramuscular Q6H PRN    benztropine (COGENTIN) tablet 1 mg  1 mg Oral Q6H PRN    divalproex sodium (DEPAKOTE) EC tablet 500 mg  500 mg Oral Q12H [address]    FLUoxetine (PROzac) capsule 40 mg  40 mg Oral Daily    haloperidol (HALDOL) tablet 5 mg  5 mg Oral Q6H PRN    haloperidol lactate (HALDOL) injection 5 mg  5 mg Intramuscular Q6H PRN    hydrOXYzine HCL (ATARAX) tablet 25 mg  25 mg Oral Q6H PRN    hydrOXYzine HCL (ATARAX) tablet 50 mg  50 mg Oral Q6H PRN    ibuprofen (MOTRIN) tablet 600 mg  600 mg Oral Q6H PRN    lidocaine (LIDODERM) 5 % patch 1 patch  1 patch Topical Daily    LORazepam (ATIVAN) injection 2 mg  2 mg Intramuscular Q6H PRN    LORazepam (ATIVAN) tablet 1 mg  1 mg Oral Q6H PRN    magnesium hydroxide (MILK OF MAGNESIA) oral suspension 30 mL  30 mL Oral Daily PRN    OLANZapine (ZyPREXA) IM injection 10 mg  10 mg Intramuscular Q8H PRN    OLANZapine (ZyPREXA) tablet 10 mg  10 mg Oral Q8H PRN    OLANZapine (ZyPREXA) tablet 5 mg  5 mg Oral HS    pantoprazole (PROTONIX) EC tablet 20 mg  20 mg Oral Early Morning    prazosin (MINIPRESS) capsule 1 mg  1 mg Oral HS    risperiDONE (RisperDAL M-TABS) dispersible tablet 1 mg  1 mg Oral Q3H PRN    senna (SENOKOT) tablet 8 6 mg  1 tablet Oral BID PRN    traZODone (DESYREL) tablet 50 mg  50 mg Oral HS PRN    ziprasidone (GEODON) capsule 40 mg  40 mg Oral BID With Meals       Behavioral Health Medications: all current active meds have been reviewed and continue current psychiatric medications  Vitals:  Vitals:    01/14/21 0743   BP: 107/61   Pulse: 80   Resp: 16   Temp: 97 7 °F (36 5 °C)   SpO2:        Laboratory results:    I have personally reviewed all pertinent laboratory/tests results    Most Recent Labs:   Lab Results   Component Value Date    WBC 6 98 01/10/2021    RBC 3 70 (L) 01/10/2021    HGB 11 6 01/10/2021    HCT 35 8 01/10/2021     01/10/2021    RDW 14 5 01/10/2021    NEUTROABS 4 09 01/10/2021    SODIUM 138 01/10/2021    K 4 1 01/10/2021     01/10/2021    CO2 31 01/10/2021    BUN 16 01/10/2021    CREATININE 0 62 01/10/2021    GLUC 107 01/10/2021    GLUF 107 (H) 01/10/2021    CALCIUM 8 7 01/10/2021    AST 11 01/10/2021    ALT 23 01/10/2021    ALKPHOS 77 01/10/2021    TP 6 6 01/10/2021    ALB 3 1 (L) 01/10/2021    TBILI 0 60 01/10/2021    VALPROICTOT 48 (L) 01/10/2021    [id number] 3 640 01/10/2021    PREGSERUM Negative 01/10/2021    RPR Non-Reactive 01/10/2021       Mental Status Evaluation:  Appearance:  in hospital attire   Behavior:  guarded   Speech:  soft   Mood:  depressed   Affect:  blunted   Language Appropriate   Thought Process:  concrete and perserverative   Thought Content:  did not appear paranoid   Perceptual Disturbances: AH of voices   Risk Potential: Suicidal ideation without plan  Denied HI  Potential for aggression: No   Sensorium:  person, place and time/date   Cognition:  recent and remote memory grossly intact   Consciousness:  alert and awake    Attention: attention span appeared shorter than expected for age   Insight:  limited   Judgment: limited   Gait/Station: normal gait/station and normal balance   Motor Activity: no abnormal movements     Progress Toward Goals: progressing slowly    Recommended Treatment: Continue with pharmacotherapy, group therapy, milieu therapy and occupational therapy  1   Increase in Prozac dosing to 60mg QD for depression  2  Continue other medications  3  No discharge date at this time  4  Will repeat Depakote level (last one not taken after 3 days of consistent dosing)  Consider increase in dosing    Risks, benefits and possible side effects of Medications:   Risks, benefits, and possible side effects of medications explained to patient and patient verbalizes understanding

## 2021-01-14 NOTE — PROGRESS NOTES
Status: Pt reported fleeting SI & attends groups  No issues to report  Medication: no changes / PRN - haldol  D/C: next week / CM did leave a message for residence requesting they bring in clothing & hygiene items; they did not bring anything       01/14/21 5898   Team Meeting   Meeting Type Daily Rounds   Team Members Present   Team Members Present Physician;Nurse;Occupational Therapist;   Physician Team Member Dr Nicolasa Houser / Claudene Fujisawa Team Member [facility] Management Team Member Tracie Morejon / Marcello Sanz   OT Team Member Heidi Landry / Kostas Narvaez   Patient/Family Present   Patient Present No   Patient's Family Present No

## 2021-01-15 LAB — VALPROATE SERPL-MCNC: 48 UG/ML (ref 50–100)

## 2021-01-15 PROCEDURE — 80164 ASSAY DIPROPYLACETIC ACD TOT: CPT | Performed by: PHYSICIAN ASSISTANT

## 2021-01-15 PROCEDURE — 99232 SBSQ HOSP IP/OBS MODERATE 35: CPT | Performed by: STUDENT IN AN ORGANIZED HEALTH CARE EDUCATION/TRAINING PROGRAM

## 2021-01-15 RX ORDER — ZIPRASIDONE HYDROCHLORIDE 60 MG/1
60 CAPSULE ORAL 2 TIMES DAILY WITH MEALS
Status: DISCONTINUED | OUTPATIENT
Start: 2021-01-15 | End: 2021-01-18 | Stop reason: HOSPADM

## 2021-01-15 RX ADMIN — ASPIRIN 81 MG: 81 TABLET, COATED ORAL at 09:50

## 2021-01-15 RX ADMIN — FLUOXETINE 60 MG: 20 CAPSULE ORAL at 09:50

## 2021-01-15 RX ADMIN — ATORVASTATIN CALCIUM 40 MG: 40 TABLET, FILM COATED ORAL at 17:10

## 2021-01-15 RX ADMIN — ZIPRASIDONE HCL 60 MG: 60 CAPSULE ORAL at 17:10

## 2021-01-15 RX ADMIN — PRAZOSIN HYDROCHLORIDE 1 MG: 1 CAPSULE ORAL at 21:23

## 2021-01-15 RX ADMIN — DIVALPROEX SODIUM 500 MG: 500 TABLET, DELAYED RELEASE ORAL at 21:23

## 2021-01-15 RX ADMIN — PANTOPRAZOLE SODIUM 20 MG: 20 TABLET, DELAYED RELEASE ORAL at 06:15

## 2021-01-15 RX ADMIN — DIVALPROEX SODIUM 500 MG: 500 TABLET, DELAYED RELEASE ORAL at 10:09

## 2021-01-15 RX ADMIN — ZIPRASIDONE HYDROCHLORIDE 40 MG: 40 CAPSULE ORAL at 09:50

## 2021-01-15 NOTE — PROGRESS NOTES
Status: Pt wrote on her arm with pen, while in the day room, "suicidal", & then showed it to her peers  Pt cooperative with washing it off & reported SI, but no plan & reported she had written this because that was what the voices were telling her to do  Pt has a valproic acid level this morning  Medication:Prozac to increase to 60mg daily / no PRNs  D/C: early next week / CM continues to struggle to make contact with staff from Pt's residence        01/15/21 0750   Team Meeting   Meeting Type Daily Rounds   Team Members Present   Team Members Present Physician;Nurse;Occupational Therapist;   Physician Team Member Dr Terrell Salazar / Frank General Team Member [facility] Management Team Member Hong Jasmine / Gabriel Maguire / Sara Castaneda   OT Team Member Irais Erazo / Tessa Wheat   Patient/Family Present   Patient Present No   Patient's Family Present No

## 2021-01-15 NOTE — CASE MANAGEMENT
ROSE contacted Rajwinder Easton at [address] @ [phone] to review that d/c is tentative for Monday  He asked if anyone had brought Pt her belongings which had been requested, but ROSE reported not  Lucila Mortimer confirmed prescriptions should be faxed to [address] [place] [address] he asked that the current medication list be faxed to his attention at [phone]; list faxed as requested  Lucila Mortimer agreed that staff would provide transportation on Monday at 10 AM   He requested that Pt be re-tested for COVID, prior to discharge, as they have having to set up quarantine

## 2021-01-15 NOTE — PROGRESS NOTES
Pt remains pleasant in conversation however scant  Reports having CAH stating to harm self however pt denies plan and verbally contracts for safety  Pt attending groups and remains social with select peers  Compliant with scheduled medications and denies any concerns or questions at this time

## 2021-01-15 NOTE — PROGRESS NOTES
Pleasant, calm and cooperative  Visible in the milieu, social with select peers/staff  Attending groups  Reports AH, command in nature to harm self  Verbally contracts for safety  Denies HI/VH  Compliant with medications

## 2021-01-15 NOTE — PROGRESS NOTES
Progress Note - Behavioral Health   Leydi Annabella Skiff 53 y o  female MRN: [id number]  Unit/Bed#: [facility] 254-02 Encounter: [id number]    Assessment/Plan   Principal Problem:    Bipolar disorder ([address] )  Active Problems:    Medical clearance for psychiatric admission    Essential hypertension    Hyperlipidemia    Class 3 severe obesity due to excess calories without serious comorbidity in [facility])    Chronic midline low back pain without sciatica    Bacterial conjunctivitis of both eyes    PTSD (post-traumatic stress disorder)      Subjective: Patient was seen, chart reviewed and case discussed with team  Patient reports that her mood is getting better as the geodon is "kicking inn" voices are diminishing  Endorses mild depressed mood and fleeting suicidal ideations  Denies any intent or plan  She is visible on the unit participating n groups and activities     Psychiatric Review of Systems:  Behavior over the last 24 hours:  improved  Sleep: normal  Appetite: normal  Medication side effects: No  ROS: no complaints, all others negative    Current Medications:  Current Facility-Administered Medications   Medication Dose Route Frequency    acetaminophen (TYLENOL) tablet 325 mg  325 mg Oral Q6H PRN    acetaminophen (TYLENOL) tablet 650 mg  650 mg Oral Q4H PRN    aluminum-magnesium hydroxide-simethicone (MYLANTA) oral suspension 30 mL  30 mL Oral Q4H PRN    aspirin (ECOTRIN LOW STRENGTH) EC tablet 81 mg  81 mg Oral Daily    atorvastatin (LIPITOR) tablet 40 mg  40 mg Oral Daily With Dinner    benztropine (COGENTIN) injection 1 mg  1 mg Intramuscular Q6H PRN    benztropine (COGENTIN) tablet 1 mg  1 mg Oral Q6H PRN    divalproex sodium (DEPAKOTE) EC tablet 500 mg  500 mg Oral Q12H [address]    FLUoxetine (PROzac) capsule 60 mg  60 mg Oral Daily    haloperidol (HALDOL) tablet 5 mg  5 mg Oral Q6H PRN    haloperidol lactate (HALDOL) injection 5 mg  5 mg Intramuscular Q6H PRN    hydrOXYzine HCL (ATARAX) tablet 25 mg  25 mg Oral Q6H PRN    hydrOXYzine HCL (ATARAX) tablet 50 mg  50 mg Oral Q6H PRN    ibuprofen (MOTRIN) tablet 600 mg  600 mg Oral Q6H PRN    lidocaine (LIDODERM) 5 % patch 1 patch  1 patch Topical Daily    LORazepam (ATIVAN) injection 2 mg  2 mg Intramuscular Q6H PRN    LORazepam (ATIVAN) tablet 1 mg  1 mg Oral Q6H PRN    magnesium hydroxide (MILK OF MAGNESIA) oral suspension 30 mL  30 mL Oral Daily PRN    OLANZapine (ZyPREXA) IM injection 10 mg  10 mg Intramuscular Q8H PRN    OLANZapine (ZyPREXA) tablet 10 mg  10 mg Oral Q8H PRN    OLANZapine (ZyPREXA) tablet 5 mg  5 mg Oral HS    pantoprazole (PROTONIX) EC tablet 20 mg  20 mg Oral Early Morning    prazosin (MINIPRESS) capsule 1 mg  1 mg Oral HS    risperiDONE (RisperDAL M-TABS) dispersible tablet 1 mg  1 mg Oral Q3H PRN    senna (SENOKOT) tablet 8 6 mg  1 tablet Oral BID PRN    traZODone (DESYREL) tablet 50 mg  50 mg Oral HS PRN    ziprasidone (GEODON) capsule 40 mg  40 mg Oral BID With Meals       Behavioral Health Medications: all current active meds have been reviewed  Vitals:  Vitals:    01/15/21 0739   BP: 139/64   Pulse: 73   Resp: 17   Temp: 97 9 °F (36 6 °C)   SpO2:        Laboratory results:  I have personally reviewed all pertinent laboratory/tests results      Mental Status Evaluation:  Appearance:  age appropriate   Behavior:  normal   Speech:  normal pitch and normal volume   Mood:  depressed   Affect:  mood-congruent   Language Appropriate   Thought Process:  concrete, goal directed and logical   Thought Content:  normal   Perceptual Disturbances: None   Risk Potential: Suicidal Ideations none, Homicidal Ideations none and Potential for Aggression No   Sensorium:  person, place, time/date, situation, day of week, month of year and year   Cognition:  recent and remote memory grossly intact   Consciousness:  alert    Attention: attention span appeared shorter than expected for age   Insight:  limited   Judgment: fair   Gait/Station: normal gait/station   Motor Activity: no abnormal movements     Progress Toward Goals: Progressing    Recommended Treatment: Continue with pharmacotherapy, group therapy, milieu therapy and occupational therapy  1  Increase geodon to 60 mg BID  2  discontinue zyprexa  3  COVID testing for placement    Risks, benefits and possible side effects of Medications:   Risks, benefits, and possible side effects of medications explained to patient and patient verbalizes understanding  Risks of medications in pregnancy explained if female patient  Patient verbalizes understanding and agrees to notify her doctor if she becomes pregnant

## 2021-01-15 NOTE — PLAN OF CARE
Problem: Alteration in Thoughts and Perception  Goal: Verbalize thoughts and feelings  Description: Interventions:  - Promote a nonjudgmental and trusting relationship with the patient through active listening and therapeutic communication  - Assess patient's level of functioning, behavior and potential for risk  - Engage patient in 1 on 1 interactions  - Encourage patient to express fears, feelings, frustrations, and discuss symptoms    - [place] patient to reality, help patient recognize reality-based thinking   - Administer medications as ordered and assess for potential side effects  - Provide the patient education related to the signs and symptoms of the illness and desired effects of prescribed medications  Outcome: Progressing  Goal: Refrain from acting on delusional thinking/internal stimuli  Description: Interventions:  - Monitor patient closely, per order   - Utilize least restrictive measures   - Set reasonable limits, give positive feedback for acceptable   - Administer medications as ordered and monitor of potential side effects  Outcome: Progressing     Problem: Depression  Goal: Refrain from harming self  Description: Interventions:  - Monitor patient closely, per order   - Supervise medication ingestion, monitor effects and side effects   Outcome: Progressing  Goal: Refrain from self-neglect  Outcome: Progressing  Goal: Attend and participate in unit activities, including therapeutic, recreational, and educational groups  Description: Interventions:  - Provide therapeutic and educational activities daily, encourage attendance and participation, and document same in the medical record   Outcome: Progressing  Goal: Complete daily ADLs, including personal hygiene independently, as able  Description: Interventions:  - Observe, teach, and assist patient with ADLS  -  Monitor and promote a balance of rest/activity, with adequate nutrition and elimination   Outcome: Progressing     Problem: Anxiety  Goal: Anxiety is at manageable level  Description: Interventions:  - Assess and monitor patient's anxiety level  - Monitor for signs and symptoms (heart palpitations, chest pain, shortness of breath, headaches, nausea, feeling jumpy, restlessness, irritable, apprehensive)  - Collaborate with interdisciplinary team and initiate plan and interventions as ordered  - [place] patient to unit/surroundings  - Explain treatment plan  - Encourage participation in care  - Encourage verbalization of concerns/fears  - Identify coping mechanisms  - Assist in developing anxiety-reducing skills  - Administer/offer alternative therapies  - Limit or eliminate stimulants  Outcome: Progressing     Problem: SELF HARM/SUICIDALITY  Goal: Will have no self-injury during hospital stay  Description: INTERVENTIONS:  - Q 15 MINUTES: Routine safety checks  - Q WAKING SHIFT & PRN: Assess risk to determine if routine checks are adequate to maintain patient safety  - Encourage patient to participate actively in care by formulating a plan to combat response to suicidal ideation, identify supports and resources  Outcome: Progressing     Problem: Nutrition/Hydration-ADULT  Goal: Nutrient/Hydration intake appropriate for improving, restoring or maintaining nutritional needs  Description: Monitor and assess patient's nutrition/hydration status for malnutrition  Collaborate with interdisciplinary team and initiate plan and interventions as ordered  Monitor patient's weight and dietary intake as ordered or per policy  Utilize nutrition screening tool and intervene as necessary  Determine patient's food preferences and provide high-protein, high-caloric foods as appropriate       INTERVENTIONS:  - Monitor oral intake, urinary output, labs, and treatment plans  - Assess nutrition and hydration status and recommend course of action  - Evaluate amount of meals eaten  - Assist patient with eating if necessary   - Allow adequate time for meals  - Recommend/ encourage appropriate diets, oral nutritional supplements, and vitamin/mineral supplements  - Order, calculate, and assess calorie counts as needed  - Recommend, monitor, and adjust tube feedings and TPN/PPN based on assessed needs  - Assess need for intravenous fluids  - Provide specific nutrition/hydration education as appropriate  - Include patient/family/caregiver in decisions related to nutrition  Outcome: Progressing

## 2021-01-15 NOTE — PLAN OF CARE
Problem: Alteration in Thoughts and Perception  Goal: Verbalize thoughts and feelings  Description: Interventions:  - Promote a nonjudgmental and trusting relationship with the patient through active listening and therapeutic communication  - Assess patient's level of functioning, behavior and potential for risk  - Engage patient in 1 on 1 interactions  - Encourage patient to express fears, feelings, frustrations, and discuss symptoms    - [place] patient to reality, help patient recognize reality-based thinking   - Administer medications as ordered and assess for potential side effects  - Provide the patient education related to the signs and symptoms of the illness and desired effects of prescribed medications  Outcome: Progressing  Goal: Refrain from acting on delusional thinking/internal stimuli  Description: Interventions:  - Monitor patient closely, per order   - Utilize least restrictive measures   - Set reasonable limits, give positive feedback for acceptable   - Administer medications as ordered and monitor of potential side effects  Outcome: Progressing     Problem: Depression  Goal: Refrain from harming self  Description: Interventions:  - Monitor patient closely, per order   - Supervise medication ingestion, monitor effects and side effects   Outcome: Progressing  Goal: Refrain from self-neglect  Outcome: Progressing  Goal: Attend and participate in unit activities, including therapeutic, recreational, and educational groups  Description: Interventions:  - Provide therapeutic and educational activities daily, encourage attendance and participation, and document same in the medical record   Outcome: Progressing  Goal: Complete daily ADLs, including personal hygiene independently, as able  Description: Interventions:  - Observe, teach, and assist patient with ADLS  -  Monitor and promote a balance of rest/activity, with adequate nutrition and elimination   Outcome: Progressing     Problem: Anxiety  Goal: Anxiety is at manageable level  Description: Interventions:  - Assess and monitor patient's anxiety level  - Monitor for signs and symptoms (heart palpitations, chest pain, shortness of breath, headaches, nausea, feeling jumpy, restlessness, irritable, apprehensive)  - Collaborate with interdisciplinary team and initiate plan and interventions as ordered  - [place] patient to unit/surroundings  - Explain treatment plan  - Encourage participation in care  - Encourage verbalization of concerns/fears  - Identify coping mechanisms  - Assist in developing anxiety-reducing skills  - Administer/offer alternative therapies  - Limit or eliminate stimulants  Outcome: Progressing     Problem: Anxiety  Goal: Anxiety is at manageable level  Description: Interventions:  - Assess and monitor patient's anxiety level  - Monitor for signs and symptoms (heart palpitations, chest pain, shortness of breath, headaches, nausea, feeling jumpy, restlessness, irritable, apprehensive)  - Collaborate with interdisciplinary team and initiate plan and interventions as ordered    - [place] patient to unit/surroundings  - Explain treatment plan  - Encourage participation in care  - Encourage verbalization of concerns/fears  - Identify coping mechanisms  - Assist in developing anxiety-reducing skills  - Administer/offer alternative therapies  - Limit or eliminate stimulants  Outcome: Progressing     Problem: SELF HARM/SUICIDALITY  Goal: Will have no self-injury during hospital stay  Description: INTERVENTIONS:  - Q 15 MINUTES: Routine safety checks  - Q WAKING SHIFT & PRN: Assess risk to determine if routine checks are adequate to maintain patient safety  - Encourage patient to participate actively in care by formulating a plan to combat response to suicidal ideation, identify supports and resources  Outcome: Progressing     Problem: Nutrition/Hydration-ADULT  Goal: Nutrient/Hydration intake appropriate for improving, restoring or maintaining nutritional needs  Description: Monitor and assess patient's nutrition/hydration status for malnutrition  Collaborate with interdisciplinary team and initiate plan and interventions as ordered  Monitor patient's weight and dietary intake as ordered or per policy  Utilize nutrition screening tool and intervene as necessary  Determine patient's food preferences and provide high-protein, high-caloric foods as appropriate       INTERVENTIONS:  - Monitor oral intake, urinary output, labs, and treatment plans  - Assess nutrition and hydration status and recommend course of action  - Evaluate amount of meals eaten  - Assist patient with eating if necessary   - Allow adequate time for meals  - Recommend/ encourage appropriate diets, oral nutritional supplements, and vitamin/mineral supplements  - Order, calculate, and assess calorie counts as needed  - Recommend, monitor, and adjust tube feedings and TPN/PPN based on assessed needs  - Assess need for intravenous fluids  - Provide specific nutrition/hydration education as appropriate  - Include patient/family/caregiver in decisions related to nutrition  Outcome: Progressing

## 2021-01-15 NOTE — CASE MANAGEMENT
CM contacted [address] [facility] @ [phone] & spoke with Catia Dukes, who reported that Pt is scheduled to talk to her therapist on 1/18 at 4:15 PM(virtual)  Pt did have an appointment with Dr Suzy Steve on Tues, however, it had been cancelled  CM was transferred to [facility] to see if this appointment was still available  CM reached voicemail & left a message

## 2021-01-15 NOTE — CASE MANAGEMENT
Pt yelling for CM from the day room, stating she wanted to go home today  CM reviewed that if Pt had a good weekend, they would discuss discharge for Monday  Pt agreeable with this plan & again asked if someone from the home would be bringing her clothing? ROSE reviewed that Sumit CRESPO had said that someone would, but no one had as of yet

## 2021-01-15 NOTE — PROGRESS NOTES
Pt in dayroom with peers and wrote with a pen on her arm "suicidal" and then showed arm to peers  This writer asked patient to wash arm which pt was cooperative with  Reports SI with no plan-verbally contracts for safety  States she wrote this on her arm "because that's what the voices keep saying to me " Encouraged pt to continue working on healthy coping skills  Pt smiled and states she will watch television with her peers  Social with select peers and denies any current concerns  Encouraged pt to notify staff if voices become bothersome- pt agreeable

## 2021-01-16 PROCEDURE — 99233 SBSQ HOSP IP/OBS HIGH 50: CPT | Performed by: STUDENT IN AN ORGANIZED HEALTH CARE EDUCATION/TRAINING PROGRAM

## 2021-01-16 RX ADMIN — FLUOXETINE 60 MG: 20 CAPSULE ORAL at 08:36

## 2021-01-16 RX ADMIN — PANTOPRAZOLE SODIUM 20 MG: 20 TABLET, DELAYED RELEASE ORAL at 06:37

## 2021-01-16 RX ADMIN — TRAZODONE HYDROCHLORIDE 50 MG: 50 TABLET ORAL at 21:23

## 2021-01-16 RX ADMIN — PRAZOSIN HYDROCHLORIDE 1 MG: 1 CAPSULE ORAL at 21:21

## 2021-01-16 RX ADMIN — LORAZEPAM 1 MG: 1 TABLET ORAL at 14:16

## 2021-01-16 RX ADMIN — ASPIRIN 81 MG: 81 TABLET, COATED ORAL at 08:36

## 2021-01-16 RX ADMIN — ZIPRASIDONE HCL 60 MG: 60 CAPSULE ORAL at 17:00

## 2021-01-16 RX ADMIN — DIVALPROEX SODIUM 500 MG: 500 TABLET, DELAYED RELEASE ORAL at 21:21

## 2021-01-16 RX ADMIN — DIVALPROEX SODIUM 500 MG: 500 TABLET, DELAYED RELEASE ORAL at 08:37

## 2021-01-16 RX ADMIN — HALOPERIDOL 5 MG: 5 TABLET ORAL at 13:45

## 2021-01-16 RX ADMIN — ZIPRASIDONE HCL 60 MG: 60 CAPSULE ORAL at 08:37

## 2021-01-16 RX ADMIN — ATORVASTATIN CALCIUM 40 MG: 40 TABLET, FILM COATED ORAL at 17:00

## 2021-01-16 NOTE — PROGRESS NOTES
Pt remains pleasant in conversation, denies SI/HI, reports CAH have decreased significantly  States she slept well overnight and denies any concerns at this time  Social with select peers and attending groups throughout the day

## 2021-01-16 NOTE — PROGRESS NOTES
Pt reports having increased anxiety and "bad thoughts" due to [facility]  Pt states having a difficult time decreasing her anxiety using coping skills of music, sound machine and television  Did receive prn medications and encouraged to socialize with peer  Pt able to verbally contract for safety  Reports a decrease in anxiety and bad thoughts with prn medications

## 2021-01-16 NOTE — NURSING NOTE
Haldol administered for c/o "bad voices" which patient verbalized as ineffective  Ativan then administered for continued c/o AH  Patient verbalizes that she will let staff know if voices tell her to harm self  Emotional support provided by staff  Patient currently utilizing effective coping techniques, such as socializing with peers and openly talking to staff about AH

## 2021-01-16 NOTE — PROGRESS NOTES
Pt reports AH and suicidal thoughts at the beginning of the shift, wrote "suicidal" on her arm, denies plan and verbally contracts for safety  Pt spoke with her aunt on the phone before dinner and appeared calm, pt then received scheduled Geodon at 1700  Pt reported "feeling bad again" shortly after but was advised to give Geodon more time to work, pt reported no issues or concerns on reassessment  Out in milieu, social with peers, cooperative

## 2021-01-16 NOTE — PROGRESS NOTES
Med note: PRN for anxiety  Pt received Ativan 1 mg PO for severe anxiety @9457  Upon follow up, patient was resting and watching TV in the dayroom  Medication partially effective  Pt stating still anxious

## 2021-01-16 NOTE — TREATMENT TEAM
AM rounds- Continues to report vague SI with no plan, reports having CAH to harm self however denies intent  Social with peers, calm and cooperative  Slept well

## 2021-01-16 NOTE — PROGRESS NOTES
Progress Note - Behavioral Health   Leydi Lesli Roth 53 y o  female MRN: [id number]  Unit/Bed#: [facility] 893-11 Encounter: [id number]    Subjective:    Per nursing, patient remains pleasant in conversation, denies SI/HI this morning, reports CAH have decreased a bit, slept well overnight, denies any concerns, social with peers, received Haldol prn this afternoon with worsening of hallucinations  Per patient, patient reports that the symptoms are "back again "  She reports having suicidal thoughts, reports that she wants to kill herself  She reports feeling "depressed "  She reports that she slept badly last night, reports hard to stay asleep  She reports appetite has been okay  She reports hearing voices telling her to kill herself, reports that she has been talking to staff when feels unsafe  She reports watching the television  Behavior over the last 24 hours:  improved  Medication side effects: No  ROS: no complaints    Objective:    Temp:  [98 4 °F (36 9 °C)] 98 4 °F (36 9 °C)  HR:  [80] 80  Resp:  [16] 16  BP: (106-142)/(56-75) 110/56    Mental Status Evaluation:  Appearance:  sitting comfortably in chair, dressed in casual clothing, adequate hygiene and grooming, unkempt, no apparent distress   Behavior:  No tics, tremors, or behaviors observed   Speech:  Normal rate, rhythm, and volume   Mood:  "depressed"   Affect:  Appears blunted   Thought Process:  Linear and goal directed and [place]   Associations intact associations   Thought Content:  Active suicidal ideation without plan   Perceptual Disturbances: Endorses CAH to harm self- feels safe on unit   Sensorium:  Oriented to person, place, time, and situation   Memory:  recent and remote memory grossly intact   Consciousness:  alert and awake   Attention: mild concentration impairments   Insight:  Limited   Judgment: limited   Gait/Station: normal gait/station   Motor Activity: no abnormal movements       Labs:    I have personally reviewed all pertinent laboratory/tests results  Labs in last 72 hours:   Recent Labs     01/15/21  1008   VALPROICTOT 48*       Progress Toward Goals: Regressing a bit    Recommended Treatment: Continue with group therapy, milieu therapy and occupational therapy  Risks, benefits and possible side effects of Medications:   Risks, benefits, and possible side effects of medications explained to patient and patient verbalizes understanding  Medications: all current active meds have been reviewed    Current Facility-Administered Medications   Medication Dose Route Frequency Provider Last Rate    acetaminophen  325 mg Oral Q6H PRN [facility], PA-C      acetaminophen  650 mg Oral Q4H PRN [facility], PA-C      aluminum-magnesium hydroxide-simethicone  30 mL Oral Q4H PRN [facility], PA-C      aspirin  81 mg Oral Daily [place], [place]      atorvastatin  40 mg Oral Daily With Jed Wiseman, PA-IKER      benztropine  1 mg Intramuscular Q6H PRN [facility], PA-C      benztropine  1 mg Oral Q6H PRN [facility], PA-C      divalproex sodium  500 mg Oral Q12H [facility] [place], [place]      FLUoxetine  60 mg Oral Daily [place], [place]      haloperidol  5 mg Oral Q6H PRN [facility], PA-C      haloperidol lactate  5 mg Intramuscular Q6H PRN [facility], PA-C      hydrOXYzine HCL  25 mg Oral Q6H PRN [facility], PA-C      hydrOXYzine HCL  50 mg Oral Q6H PRN [facility], PA-C      ibuprofen  600 mg Oral Q6H PRN [facility], PA-C      lidocaine  1 patch Topical Daily [place], [place]      LORazepam  2 mg Intramuscular Q6H PRN [facility], PA-C      LORazepam  1 mg Oral Q6H PRN [facility], PA-C      magnesium hydroxide  30 mL Oral Daily PRN [facility], PA-C      OLANZapine  10 mg Intramuscular Q8H PRN [facility], PA-C      OLANZapine  10 mg Oral Q8H PRN [facility], PA-C      pantoprazole  20 mg Oral Early Morning Susi Manriquez PA-C      prazosin  1 mg Oral HS Karri Barger MD      risperiDONE  1 mg Oral Q3H PRN Gayl Graft, JO      senna  1 tablet Oral BID PRN Karri Barger MD      traZODone  50 mg Oral HS PRN Gayl Graft, JO      ziprasidone  60 mg Oral BID With Meals Karri Barger MD         Assessment/Plan   Principal Problem:    Bipolar disorder [facility])  Active Problems:    Medical clearance for psychiatric admission    Essential hypertension    Hyperlipidemia    Class 3 severe obesity due to excess calories without serious comorbidity in adult [facility])    Chronic midline low back pain without sciatica    Bacterial conjunctivitis of both eyes    PTSD (post-traumatic stress disorder)    52 y/o Female with bipolar disorder- continues to report depressive symptoms, fleeting active SI appears chronic in nature, chronic AH, some increased distress today    Plan:  -Geodon dosing adjusted yesterday- will continue to monitor on elevated dosage   -May consider titrating Depakote

## 2021-01-17 PROCEDURE — 87635 SARS-COV-2 COVID-19 AMP PRB: CPT | Performed by: STUDENT IN AN ORGANIZED HEALTH CARE EDUCATION/TRAINING PROGRAM

## 2021-01-17 PROCEDURE — U0003 INFECTIOUS AGENT DETECTION BY NUCLEIC ACID (DNA OR RNA); SEVERE ACUTE RESPIRATORY SYNDROME CORONAVIRUS 2 (SARS-COV-2) (CORONAVIRUS DISEASE [COVID-19]), AMPLIFIED PROBE TECHNIQUE, MAKING USE OF HIGH THROUGHPUT TECHNOLOGIES AS DESCRIBED BY CMS-2020-01-R: HCPCS | Performed by: STUDENT IN AN ORGANIZED HEALTH CARE EDUCATION/TRAINING PROGRAM

## 2021-01-17 PROCEDURE — 99232 SBSQ HOSP IP/OBS MODERATE 35: CPT | Performed by: STUDENT IN AN ORGANIZED HEALTH CARE EDUCATION/TRAINING PROGRAM

## 2021-01-17 RX ORDER — SENNOSIDES 8.6 MG
1 TABLET ORAL 2 TIMES DAILY PRN
Status: DISCONTINUED | OUTPATIENT
Start: 2021-01-17 | End: 2021-01-18 | Stop reason: HOSPADM

## 2021-01-17 RX ADMIN — ATORVASTATIN CALCIUM 40 MG: 40 TABLET, FILM COATED ORAL at 17:08

## 2021-01-17 RX ADMIN — DIVALPROEX SODIUM 500 MG: 500 TABLET, DELAYED RELEASE ORAL at 21:15

## 2021-01-17 RX ADMIN — SENNOSIDES 8.6 MG: 8.6 TABLET, FILM COATED ORAL at 16:18

## 2021-01-17 RX ADMIN — ZIPRASIDONE HCL 60 MG: 60 CAPSULE ORAL at 10:02

## 2021-01-17 RX ADMIN — ASPIRIN 81 MG: 81 TABLET, COATED ORAL at 10:01

## 2021-01-17 RX ADMIN — DIVALPROEX SODIUM 500 MG: 500 TABLET, DELAYED RELEASE ORAL at 10:02

## 2021-01-17 RX ADMIN — PRAZOSIN HYDROCHLORIDE 1 MG: 1 CAPSULE ORAL at 21:15

## 2021-01-17 RX ADMIN — FLUOXETINE 60 MG: 20 CAPSULE ORAL at 10:01

## 2021-01-17 RX ADMIN — PANTOPRAZOLE SODIUM 20 MG: 20 TABLET, DELAYED RELEASE ORAL at 05:52

## 2021-01-17 RX ADMIN — ZIPRASIDONE HCL 60 MG: 60 CAPSULE ORAL at 17:08

## 2021-01-17 NOTE — PLAN OF CARE
Problem: Alteration in Thoughts and Perception  Goal: Verbalize thoughts and feelings  Description: Interventions:  - Promote a nonjudgmental and trusting relationship with the patient through active listening and therapeutic communication  - Assess patient's level of functioning, behavior and potential for risk  - Engage patient in 1 on 1 interactions  - Encourage patient to express fears, feelings, frustrations, and discuss symptoms    - [place] patient to reality, help patient recognize reality-based thinking   - Administer medications as ordered and assess for potential side effects  - Provide the patient education related to the signs and symptoms of the illness and desired effects of prescribed medications  Outcome: Progressing  Goal: Refrain from acting on delusional thinking/internal stimuli  Description: Interventions:  - Monitor patient closely, per order   - Utilize least restrictive measures   - Set reasonable limits, give positive feedback for acceptable   - Administer medications as ordered and monitor of potential side effects  Outcome: Progressing     Problem: Depression  Goal: Refrain from harming self  Description: Interventions:  - Monitor patient closely, per order   - Supervise medication ingestion, monitor effects and side effects   Outcome: Progressing  Goal: Refrain from self-neglect  Outcome: Progressing  Goal: Attend and participate in unit activities, including therapeutic, recreational, and educational groups  Description: Interventions:  - Provide therapeutic and educational activities daily, encourage attendance and participation, and document same in the medical record   Outcome: Progressing  Goal: Complete daily ADLs, including personal hygiene independently, as able  Description: Interventions:  - Observe, teach, and assist patient with ADLS  -  Monitor and promote a balance of rest/activity, with adequate nutrition and elimination   Outcome: Progressing     Problem: Anxiety  Goal: Anxiety is at manageable level  Description: Interventions:  - Assess and monitor patient's anxiety level  - Monitor for signs and symptoms (heart palpitations, chest pain, shortness of breath, headaches, nausea, feeling jumpy, restlessness, irritable, apprehensive)  - Collaborate with interdisciplinary team and initiate plan and interventions as ordered  - [place] patient to unit/surroundings  - Explain treatment plan  - Encourage participation in care  - Encourage verbalization of concerns/fears  - Identify coping mechanisms  - Assist in developing anxiety-reducing skills  - Administer/offer alternative therapies  - Limit or eliminate stimulants  Outcome: Progressing     Problem: SELF HARM/SUICIDALITY  Goal: Will have no self-injury during hospital stay  Description: INTERVENTIONS:  - Q 15 MINUTES: Routine safety checks  - Q WAKING SHIFT & PRN: Assess risk to determine if routine checks are adequate to maintain patient safety  - Encourage patient to participate actively in care by formulating a plan to combat response to suicidal ideation, identify supports and resources  Outcome: Progressing     Problem: Ineffective Coping  Goal: Participates in unit activities  Description: Interventions:  - Provide therapeutic environment   - Provide required programming   - Redirect inappropriate behaviors   Outcome: Progressing     Problem: Nutrition/Hydration-ADULT  Goal: Nutrient/Hydration intake appropriate for improving, restoring or maintaining nutritional needs  Description: Monitor and assess patient's nutrition/hydration status for malnutrition  Collaborate with interdisciplinary team and initiate plan and interventions as ordered  Monitor patient's weight and dietary intake as ordered or per policy  Utilize nutrition screening tool and intervene as necessary  Determine patient's food preferences and provide high-protein, high-caloric foods as appropriate       INTERVENTIONS:  - Monitor oral intake, urinary output, labs, and treatment plans  - Assess nutrition and hydration status and recommend course of action  - Evaluate amount of meals eaten  - Assist patient with eating if necessary   - Allow adequate time for meals  - Recommend/ encourage appropriate diets, oral nutritional supplements, and vitamin/mineral supplements  - Order, calculate, and assess calorie counts as needed  - Recommend, monitor, and adjust tube feedings and TPN/PPN based on assessed needs  - Assess need for intravenous fluids  - Provide specific nutrition/hydration education as appropriate  - Include patient/family/caregiver in decisions related to nutrition  Outcome: Progressing

## 2021-01-17 NOTE — PROGRESS NOTES
Progress Note - Behavioral Health   Leydi Lesli Roth 53 y o  female MRN: [id number]  Unit/Bed#: [facility] 254-02 Encounter: [id number]    Subjective:    Per nursing, patient wrote suicidal on arm yesterday evening, spoke with aunt on phone before dinner and was calmer, later felt badly again  Social with peers and cooperative  Had COVID testing this morning, had some SI this morning, attending groups  Per patient, patient reports that today is going well  She reports her mood has been "pretty good," denying feelings of sadness or depression, denying anger or irritability  She reports sleeping well last night, took a sleeping pill to help her to sleep  She reports appetite has been good  Patient denies any passive or active suicidal ideation, intent, or plan  She reports that the voices have been better than yesterday, no current AH  She reports that she will have to be quarantined at group home  Behavior over the last 24 hours:  improved  Medication side effects: No  ROS: no complaints    Objective:    Temp:  [97 7 °F (36 5 °C)-97 9 °F (36 6 °C)] 97 7 °F (36 5 °C)  HR:  [72-88] 72  Resp:  [17-18] 17  BP: (124-132)/(70-73) 126/73    Mental Status Evaluation:  Appearance:  sitting comfortably in chair, dressed in casual clothing, adequate hygiene and grooming, unkempt   Behavior:  No tics, tremors, or behaviors observed   Speech:  Normal rate, rhythm, and volume   Mood:  "pretty good"   Affect:  Appears generally euthymic, stable, mood-congruent   Thought Process:  Linear and goal directed   Associations intact associations   Thought Content:  No passive or active suicidal or homicidal ideation, intent, or plan     Perceptual Disturbances: Denies any auditory or visual hallucinations   Sensorium:  Oriented to person, place, time, and situation   Memory:  recent and remote memory grossly intact   Consciousness:  alert and awake   Attention: attention span and concentration were age appropriate   Insight:  poor Judgment: limited   Gait/Station: normal gait/station   Motor Activity: no abnormal movements       Labs: I have personally reviewed all pertinent laboratory/tests results  Labs in last 72 hours:   Recent Labs     01/15/21  1008   VALPROICTOT 48*       Progress Toward Goals: Progressing    Recommended Treatment: Continue with group therapy, milieu therapy and occupational therapy  Risks, benefits and possible side effects of Medications:   Risks, benefits, and possible side effects of medications explained to patient and patient verbalizes understanding  Medications: all current active meds have been reviewed    Current Facility-Administered Medications   Medication Dose Route Frequency Provider Last Rate    acetaminophen  325 mg Oral Q6H PRN Henry Levo, PA-C      acetaminophen  650 mg Oral Q4H PRN Henry Levo, PA-C      aluminum-magnesium hydroxide-simethicone  30 mL Oral Q4H PRN Henry Levo, PA-C      aspirin  81 mg Oral Daily [place], [place]      atorvastatin  40 mg Oral Daily With Adkins Soup, PA-C      benztropine  1 mg Intramuscular Q6H PRN Henry Levo, PA-C      benztropine  1 mg Oral Q6H PRN Henry Levo, PA-C      divalproex sodium  500 mg Oral Q12H [facility], [place]      FLUoxetine  60 mg Oral Daily [place]      haloperidol  5 mg Oral Q6H PRN Henry Levo, PA-C      haloperidol lactate  5 mg Intramuscular Q6H PRN Henry Levo, PA-C      hydrOXYzine HCL  25 mg Oral Q6H PRN Henry Levo, PA-C      hydrOXYzine HCL  50 mg Oral Q6H PRN Henry Levo, PA-C      ibuprofen  600 mg Oral Q6H PRN Henry Levo, PA-C      lidocaine  1 patch Topical Daily [place], [place]      LORazepam  2 mg Intramuscular Q6H PRN Henry Levo, PA-C      LORazepam  1 mg Oral Q6H PRN Henry Levo, PA-C      magnesium hydroxide  30 mL Oral Daily PRN Henry Levo, PA-C      OLANZapine 10 mg Intramuscular Q8H PRN Doc Flirt, JO      OLANZapine  10 mg Oral Q8H PRN Doc Flirt, JO      pantoprazole  20 mg Oral Early Morning Jaxson Reyna PA-C      prazosin  1 mg Oral HS Jero Leonardo MD      risperiDONE  1 mg Oral Q3H PRN Doc Flirt, JO      senna  1 tablet Oral BID PRN Jero Leonardo MD      traZODone  50 mg Oral HS PRN Doc Flirt, JO      ziprasidone  60 mg Oral BID With Meals Jero Leonardo MD             Assessment/Plan   Principal Problem:    Bipolar disorder [facility])  Active Problems:    Medical clearance for psychiatric admission    Essential hypertension    Hyperlipidemia    Class 3 severe obesity due to excess calories without serious comorbidity in adult [facility])    Chronic midline low back pain without sciatica    Bacterial conjunctivitis of both eyes    PTSD (post-traumatic stress disorder)    54 y/o Female with bipolar disorder- some increase in symptoms likely related to upcoming discharge, feeling better today than yesterday denying AVH, denying SI  Plan:  -COVID testing completed- awaiting results for placement  -Continue current med regimen

## 2021-01-17 NOTE — TREATMENT TEAM
AM rounds-in the morning reports AH improved, denies SI  Later in day reports SI returned as well as CAH  Received prn medications  Pt wrote "suicidal" on arm," Attending groups, slept overnight

## 2021-01-17 NOTE — PROGRESS NOTES
Pt reports having SI with no plan-verbally contracts for safety  Denies HI  Pt reports having CAH however at this time not bothersome and pt is able to distract self  Pt attending groups and social with select peers

## 2021-01-17 NOTE — PROGRESS NOTES
Med note: PRN for insomnia  Pt received 50 mg po @2123  Upon follow up, patient is sleeping in bed  Medication effective

## 2021-01-18 VITALS
SYSTOLIC BLOOD PRESSURE: 140 MMHG | TEMPERATURE: 97.2 F | OXYGEN SATURATION: 97 % | WEIGHT: 284.39 LBS | HEART RATE: 77 BPM | BODY MASS INDEX: 45.71 KG/M2 | RESPIRATION RATE: 17 BRPM | HEIGHT: 66 IN | DIASTOLIC BLOOD PRESSURE: 79 MMHG

## 2021-01-18 LAB — SARS-COV-2 RNA RESP QL NAA+PROBE: NEGATIVE

## 2021-01-18 PROCEDURE — 99239 HOSP IP/OBS DSCHRG MGMT >30: CPT | Performed by: STUDENT IN AN ORGANIZED HEALTH CARE EDUCATION/TRAINING PROGRAM

## 2021-01-18 RX ORDER — DIVALPROEX SODIUM 500 MG/1
500 TABLET, DELAYED RELEASE ORAL EVERY 12 HOURS SCHEDULED
Qty: 14 TABLET | Refills: 0 | Status: SHIPPED | OUTPATIENT
Start: 2021-01-18 | End: 2021-05-20 | Stop reason: ALTCHOICE

## 2021-01-18 RX ORDER — ZIPRASIDONE HYDROCHLORIDE 60 MG/1
60 CAPSULE ORAL 2 TIMES DAILY WITH MEALS
Qty: 14 CAPSULE | Refills: 0 | Status: SHIPPED | OUTPATIENT
Start: 2021-01-18 | End: 2021-02-20

## 2021-01-18 RX ORDER — PRAZOSIN HYDROCHLORIDE 1 MG/1
1 CAPSULE ORAL
Qty: 7 CAPSULE | Refills: 0 | Status: SHIPPED | OUTPATIENT
Start: 2021-01-18 | End: 2021-10-15 | Stop reason: HOSPADM

## 2021-01-18 RX ORDER — FLUOXETINE HYDROCHLORIDE 20 MG/1
60 CAPSULE ORAL DAILY
Qty: 21 CAPSULE | Refills: 0 | Status: SHIPPED | OUTPATIENT
Start: 2021-01-18 | End: 2021-10-15 | Stop reason: HOSPADM

## 2021-01-18 RX ORDER — ASPIRIN 81 MG/1
81 TABLET ORAL DAILY
Start: 2021-01-18 | End: 2021-10-15 | Stop reason: HOSPADM

## 2021-01-18 RX ADMIN — DIVALPROEX SODIUM 500 MG: 500 TABLET, DELAYED RELEASE ORAL at 09:06

## 2021-01-18 RX ADMIN — ASPIRIN 81 MG: 81 TABLET, COATED ORAL at 09:06

## 2021-01-18 RX ADMIN — ZIPRASIDONE HCL 60 MG: 60 CAPSULE ORAL at 08:10

## 2021-01-18 RX ADMIN — FLUOXETINE 60 MG: 20 CAPSULE ORAL at 09:07

## 2021-01-18 RX ADMIN — PANTOPRAZOLE SODIUM 20 MG: 20 TABLET, DELAYED RELEASE ORAL at 06:26
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
## 2021-01-18 NOTE — CASE MANAGEMENT
CM met with Pt to review & sign IMM  Pt verbalized readiness for discharge & wanted to know who would be picking her up  CM reviewed she had spoken with Nathalia Ibarra from [place] [address] on Friday, but he had not said who would transport Pt  CM reviewed that Pt is scheduled to talk to her therapist today & maybe her psychiatrist tomorrow; Pt in agreement & denied any questions at this time  ROSE received a return call from Carla Combs at [address] [place] [address] & she was able to re-schedule Pt to talk to Dr Tamera Conley tomorrow at 12:30 PM       CM met with Pt to review this appointment & she was in agreement

## 2021-01-18 NOTE — PROGRESS NOTES
Status: Pt social & cooperative on the unit  On Saturday, Pt wrote "suicidal" on her arm but denied any plan & contracted for safety  Pt denied any SI/HI on Sunday  Pt did get re-tested for COVID, per residences requirement  Medication: no changes / PRN - Senokot  D/C: today / [facility] residence is to provide transportation at 10 AM   Pt is scheduled to talk to her therapist this afternoon & possibly see the psychiatrist either tomorrow or next Tues; CM is waiting for a returned call       01/18/21 5200   Team Meeting   Meeting Type Daily Rounds   Team Members Present   Team Members Present Physician;Nurse;Occupational Therapist;; Other (Discipline and Name)   Physician Team Member Dr Sylvia Zhou / Seferino Bazan / Mami Preston Member Pranav Proctor / Nolan Rhodes Management Team Member Kristie Miles / Jennifer Pollard   OT Team Member Gurwinder Desir / Juany Bolivar   Other (Discipline and Name) Nursing Students   Patient/Family Present   Patient Present No   Patient's Family Present No

## 2021-01-18 NOTE — PROGRESS NOTES
Pt is calm and cooperative, pleasant during interaction  Reports sleeping well overnight  Denies SI/HI/AVH this morning  Denies any negative thoughts  Expresses readiness for discharge

## 2021-01-18 NOTE — PROGRESS NOTES
Pt is visible in the milieu and is noted to be social with select peers  Pt is cooperative and pleasant in conversation  Pt denies anxiety and depression and also denies SI/HI/AVH  Pt also denies having any questions or concerns

## 2021-01-18 NOTE — PLAN OF CARE
Problem: Alteration in Thoughts and Perception  Goal: Verbalize thoughts and feelings  Description: Interventions:  - Promote a nonjudgmental and trusting relationship with the patient through active listening and therapeutic communication  - Assess patient's level of functioning, behavior and potential for risk  - Engage patient in 1 on 1 interactions  - Encourage patient to express fears, feelings, frustrations, and discuss symptoms    - [place] patient to reality, help patient recognize reality-based thinking   - Administer medications as ordered and assess for potential side effects  - Provide the patient education related to the signs and symptoms of the illness and desired effects of prescribed medications  Outcome: Progressing  Goal: Refrain from acting on delusional thinking/internal stimuli  Description: Interventions:  - Monitor patient closely, per order   - Utilize least restrictive measures   - Set reasonable limits, give positive feedback for acceptable   - Administer medications as ordered and monitor of potential side effects  Outcome: Progressing     Problem: Depression  Goal: Refrain from harming self  Description: Interventions:  - Monitor patient closely, per order   - Supervise medication ingestion, monitor effects and side effects   Outcome: Progressing  Goal: Refrain from self-neglect  Outcome: Progressing  Goal: Attend and participate in unit activities, including therapeutic, recreational, and educational groups  Description: Interventions:  - Provide therapeutic and educational activities daily, encourage attendance and participation, and document same in the medical record   Outcome: Progressing  Goal: Complete daily ADLs, including personal hygiene independently, as able  Description: Interventions:  - Observe, teach, and assist patient with ADLS  -  Monitor and promote a balance of rest/activity, with adequate nutrition and elimination   Outcome: Progressing     Problem: Anxiety  Goal: Anxiety is at manageable level  Description: Interventions:  - Assess and monitor patient's anxiety level  - Monitor for signs and symptoms (heart palpitations, chest pain, shortness of breath, headaches, nausea, feeling jumpy, restlessness, irritable, apprehensive)  - Collaborate with interdisciplinary team and initiate plan and interventions as ordered  - [place] patient to unit/surroundings  - Explain treatment plan  - Encourage participation in care  - Encourage verbalization of concerns/fears  - Identify coping mechanisms  - Assist in developing anxiety-reducing skills  - Administer/offer alternative therapies  - Limit or eliminate stimulants  Outcome: Progressing     Problem: SELF HARM/SUICIDALITY  Goal: Will have no self-injury during hospital stay  Description: INTERVENTIONS:  - Q 15 MINUTES: Routine safety checks  - Q WAKING SHIFT & PRN: Assess risk to determine if routine checks are adequate to maintain patient safety  - Encourage patient to participate actively in care by formulating a plan to combat response to suicidal ideation, identify supports and resources  Outcome: Progressing     Problem: Nutrition/Hydration-ADULT  Goal: Nutrient/Hydration intake appropriate for improving, restoring or maintaining nutritional needs  Description: Monitor and assess patient's nutrition/hydration status for malnutrition  Collaborate with interdisciplinary team and initiate plan and interventions as ordered  Monitor patient's weight and dietary intake as ordered or per policy  Utilize nutrition screening tool and intervene as necessary  Determine patient's food preferences and provide high-protein, high-caloric foods as appropriate       INTERVENTIONS:  - Monitor oral intake, urinary output, labs, and treatment plans  - Assess nutrition and hydration status and recommend course of action  - Evaluate amount of meals eaten  - Assist patient with eating if necessary   - Allow adequate time for meals  - Recommend/ encourage appropriate diets, oral nutritional supplements, and vitamin/mineral supplements  - Order, calculate, and assess calorie counts as needed  - Recommend, monitor, and adjust tube feedings and TPN/PPN based on assessed needs  - Assess need for intravenous fluids  - Provide specific nutrition/hydration education as appropriate  - Include patient/family/caregiver in decisions related to nutrition  Outcome: Progressing

## 2021-01-22 ENCOUNTER — OFFICE VISIT (OUTPATIENT)
Dept: URGENT CARE | Facility: CLINIC | Age: 51
End: 2021-01-22
Payer: MEDICARE

## 2021-01-22 VITALS
SYSTOLIC BLOOD PRESSURE: 112 MMHG | HEIGHT: 64 IN | RESPIRATION RATE: 18 BRPM | BODY MASS INDEX: 49.2 KG/M2 | OXYGEN SATURATION: 96 % | DIASTOLIC BLOOD PRESSURE: 68 MMHG | TEMPERATURE: 97.6 F | WEIGHT: 288.2 LBS | HEART RATE: 88 BPM

## 2021-01-22 DIAGNOSIS — R42 VERTIGO: Primary | ICD-10-CM

## 2021-01-22 PROCEDURE — G0463 HOSPITAL OUTPT CLINIC VISIT: HCPCS | Performed by: FAMILY MEDICINE

## 2021-01-22 PROCEDURE — 99213 OFFICE O/P EST LOW 20 MIN: CPT | Performed by: FAMILY MEDICINE

## 2021-01-22 RX ORDER — FLUOXETINE HYDROCHLORIDE 40 MG/1
20 CAPSULE ORAL DAILY
COMMUNITY
End: 2021-02-20

## 2021-01-22 RX ORDER — LISINOPRIL 20 MG/1
20 TABLET ORAL
COMMUNITY
Start: 2020-12-16 | End: 2021-10-15 | Stop reason: HOSPADM

## 2021-01-22 RX ORDER — MECLIZINE HCL 12.5 MG/1
12.5 TABLET ORAL EVERY 12 HOURS PRN
Qty: 14 TABLET | Refills: 0 | Status: SHIPPED | OUTPATIENT
Start: 2021-01-22 | End: 2021-02-20

## 2021-01-22 RX ORDER — DULOXETIN HYDROCHLORIDE 20 MG/1
20 CAPSULE, DELAYED RELEASE ORAL DAILY
COMMUNITY
End: 2021-02-20

## 2021-01-22 RX ORDER — TRAZODONE HYDROCHLORIDE 50 MG/1
50 TABLET ORAL
COMMUNITY
Start: 2020-12-15 | End: 2021-02-20

## 2021-01-22 RX ORDER — OLANZAPINE 20 MG/1
20 TABLET ORAL
Status: ON HOLD | COMMUNITY
End: 2021-10-10

## 2021-01-22 NOTE — PROGRESS NOTES
[facility]        NAME: Sumit Murray is a 53 y o  female  : 1970    MRN: [id number]  DATE: 2021  TIME: 6:00 PM    Assessment and Plan   Vertigo [R42]  1  Vertigo           Patient Instructions       Follow up with PCP in 3-5 days  Proceed to  ER if symptoms worsen  Chief Complaint     Chief Complaint   Patient presents with    Dizziness     onset two days dizzy spels, just started smoking 1-2 cogarettes daily, foot ankle pain, drinks Iced tea and coca cola as main drinks, sedentary  History of Present Illness         78-year-old female with a 2 day history of dizziness  Patient feels as if the room is spinning around her  She denies any nausea or vomiting  She denies any chest pain or shortness of breath  She denies any headaches or syncope  Review of Systems   Review of Systems   Constitutional: Negative  HENT: Negative  Eyes: Negative  Respiratory: Negative  Negative for shortness of breath  Cardiovascular: Negative  Negative for chest pain  Gastrointestinal: Negative  Genitourinary: Negative  Musculoskeletal: Negative  Skin: Negative  Allergic/Immunologic: Negative  Neurological: Positive for dizziness  Negative for headaches  Hematological: Negative  Psychiatric/Behavioral: Negative            Current Medications       Current Outpatient Medications:     acetaminophen (TYLENOL) 325 mg tablet, Take 2 tablets (650 mg total) by mouth every 6 (six) hours as needed for mild pain, Disp: 30 tablet, Rfl: 0    aspirin (ECOTRIN LOW STRENGTH) 81 mg EC tablet, Take 1 tablet (81 mg total) by mouth daily At 9am, Disp:  , Rfl:     atorvastatin (LIPITOR) 40 mg tablet, Take 40 mg by mouth daily, Disp: , Rfl:     divalproex sodium (DEPAKOTE) 500 mg EC tablet, Take 1 tablet (500 mg total) by mouth every 12 (twelve) hours At 9am and 9pm, Disp: 14 tablet, Rfl: 0    DULoxetine (Cymbalta) 20 mg capsule, Take 20 mg by mouth daily, Disp: , Rfl:   FLUoxetine (PROzac) 20 mg capsule, Take 3 capsules (60 mg total) by mouth daily At 9am (Patient taking differently: Take 40 mg by mouth daily At 9am), Disp: 21 capsule, Rfl: 0    FLUoxetine (PROzac) 40 MG capsule, Take 20 mg by mouth daily, Disp: , Rfl:     lisinopril (ZESTRIL) 20 mg tablet, Take 20 mg by mouth, Disp: , Rfl:     OLANZapine (ZyPREXA) 20 MG tablet, Take 20 mg by mouth daily at bedtime, Disp: , Rfl:     pantoprazole (PROTONIX) 20 mg tablet, Take 20 mg by mouth daily Take one capsule every morning before breakfast for heartburn, Disp: , Rfl:     prazosin (MINIPRESS) 1 mg capsule, Take 1 capsule (1 mg total) by mouth daily at bedtime Take one capsule by mouth at bedtime for dreams / sleep disturbances  , Disp: 7 capsule, Rfl: 0    traZODone (DESYREL) 50 mg tablet, Take 50 mg by mouth, Disp: , Rfl:     ziprasidone (GEODON) 60 mg capsule, Take 1 capsule (60 mg total) by mouth 2 (two) times a day with meals With breakfast and dinner, Disp: 14 capsule, Rfl: 0    Current Allergies     Allergies as of 01/22/2021 - Reviewed 01/22/2021   Allergen Reaction Noted    Fish-derived products  10/16/2020            The following portions of the patient's history were reviewed and updated as appropriate: allergies, current medications, past family history, past medical history, past social history, past surgical history and problem list      Past Medical History:   Diagnosis Date    Bipolar 1 disorder ([facility] [address] )     Hypertension     Psychiatric disorder     PTSD (post-traumatic stress disorder)        Past Surgical History:   Procedure Laterality Date    APPENDECTOMY      CHOLECYSTECTOMY      FOOT SURGERY Right        History reviewed  No pertinent family history  Medications have been verified  Objective   /68   Pulse 88   Temp 97 6 °F (36 4 °C) (Temporal)   Resp 18   Ht 5' 4" (1 626 m)   Wt 131 kg (288 lb 3 2 oz)   SpO2 96%   BMI 49 47 kg/m²   No LMP recorded   Patient is postmenopausal        Physical Exam     Physical Exam  HENT:      Nose: Nose normal       Mouth/Throat:      Mouth: Mucous membranes are moist    Eyes:      Pupils: Pupils are equal, round, and reactive to light  Neck:      Musculoskeletal: Normal range of motion  Cardiovascular:      Rate and Rhythm: Normal rate  Abdominal:      General: Abdomen is flat  Musculoskeletal: Normal range of motion  Skin:     General: Skin is warm  Neurological:      Mental Status: She is alert

## 2021-01-23 ENCOUNTER — HOSPITAL ENCOUNTER (EMERGENCY)
Facility: HOSPITAL | Age: 51
End: 2021-01-25
Attending: EMERGENCY MEDICINE | Admitting: EMERGENCY MEDICINE
Payer: MEDICARE

## 2021-01-23 DIAGNOSIS — R45.851 SUICIDAL IDEATION: Primary | ICD-10-CM

## 2021-01-23 LAB
AMPHETAMINES SERPL QL SCN: NEGATIVE
BARBITURATES UR QL: NEGATIVE
BENZODIAZ UR QL: NEGATIVE
COCAINE UR QL: NEGATIVE
ETHANOL EXG-MCNC: 0 MG/DL
EXT PREG TEST URINE: NEGATIVE
EXT. CONTROL ED NAV: NORMAL
FLUAV RNA RESP QL NAA+PROBE: NEGATIVE
FLUBV RNA RESP QL NAA+PROBE: NEGATIVE
METHADONE UR QL: NEGATIVE
OPIATES UR QL SCN: NEGATIVE
OXYCODONE+OXYMORPHONE UR QL SCN: NEGATIVE
PCP UR QL: NEGATIVE
RSV RNA RESP QL NAA+PROBE: NEGATIVE
SARS-COV-2 RNA RESP QL NAA+PROBE: NEGATIVE
THC UR QL: NEGATIVE

## 2021-01-23 PROCEDURE — 82075 ASSAY OF BREATH ETHANOL: CPT | Performed by: EMERGENCY MEDICINE

## 2021-01-23 PROCEDURE — 99285 EMERGENCY DEPT VISIT HI MDM: CPT | Performed by: EMERGENCY MEDICINE

## 2021-01-23 PROCEDURE — 81025 URINE PREGNANCY TEST: CPT | Performed by: EMERGENCY MEDICINE

## 2021-01-23 PROCEDURE — 0241U HB NFCT DS VIR RESP RNA 4 TRGT: CPT | Performed by: EMERGENCY MEDICINE

## 2021-01-23 PROCEDURE — 80307 DRUG TEST PRSMV CHEM ANLYZR: CPT | Performed by: EMERGENCY MEDICINE

## 2021-01-23 PROCEDURE — 99285 EMERGENCY DEPT VISIT HI MDM: CPT

## 2021-01-23 RX ORDER — VALPROIC ACID 250 MG/1
500 CAPSULE, LIQUID FILLED ORAL EVERY 12 HOURS SCHEDULED
Status: DISCONTINUED | OUTPATIENT
Start: 2021-01-24 | End: 2021-01-25 | Stop reason: HOSPADM

## 2021-01-23 RX ORDER — TRAZODONE HYDROCHLORIDE 50 MG/1
50 TABLET ORAL
Status: DISCONTINUED | OUTPATIENT
Start: 2021-01-23 | End: 2021-01-25 | Stop reason: HOSPADM

## 2021-01-23 RX ORDER — ZIPRASIDONE HYDROCHLORIDE 60 MG/1
60 CAPSULE ORAL 2 TIMES DAILY WITH MEALS
Status: DISCONTINUED | OUTPATIENT
Start: 2021-01-24 | End: 2021-01-25 | Stop reason: HOSPADM

## 2021-01-23 RX ORDER — FLUOXETINE 10 MG/1
40 CAPSULE ORAL DAILY
Status: DISCONTINUED | OUTPATIENT
Start: 2021-01-24 | End: 2021-01-25 | Stop reason: HOSPADM

## 2021-01-23 RX ORDER — ASPIRIN 81 MG/1
81 TABLET ORAL DAILY
Status: DISCONTINUED | OUTPATIENT
Start: 2021-01-24 | End: 2021-01-25 | Stop reason: HOSPADM

## 2021-01-23 RX ORDER — ATORVASTATIN CALCIUM 40 MG/1
40 TABLET, FILM COATED ORAL
Status: DISCONTINUED | OUTPATIENT
Start: 2021-01-24 | End: 2021-01-25 | Stop reason: HOSPADM

## 2021-01-23 RX ORDER — PRAZOSIN HYDROCHLORIDE 1 MG/1
1 CAPSULE ORAL
Status: DISCONTINUED | OUTPATIENT
Start: 2021-01-24 | End: 2021-01-25 | Stop reason: HOSPADM

## 2021-01-23 RX ORDER — OLANZAPINE 10 MG/1
20 TABLET ORAL
Status: DISCONTINUED | OUTPATIENT
Start: 2021-01-24 | End: 2021-01-24

## 2021-01-23 RX ORDER — ACETAMINOPHEN 325 MG/1
650 TABLET ORAL EVERY 4 HOURS PRN
Status: DISCONTINUED | OUTPATIENT
Start: 2021-01-23 | End: 2021-01-25 | Stop reason: HOSPADM

## 2021-01-23 RX ORDER — LISINOPRIL 5 MG/1
20 TABLET ORAL DAILY
Status: DISCONTINUED | OUTPATIENT
Start: 2021-01-24 | End: 2021-01-25 | Stop reason: HOSPADM

## 2021-01-23 RX ORDER — PANTOPRAZOLE SODIUM 20 MG/1
20 TABLET, DELAYED RELEASE ORAL
Status: DISCONTINUED | OUTPATIENT
Start: 2021-01-24 | End: 2021-01-25 | Stop reason: HOSPADM

## 2021-01-23 RX ORDER — MECLIZINE HCL 12.5 MG/1
12.5 TABLET ORAL EVERY 12 HOURS PRN
Status: DISCONTINUED | OUTPATIENT
Start: 2021-01-23 | End: 2021-01-25 | Stop reason: HOSPADM

## 2021-01-23 NOTE — ED NOTES
Crisis Worker (CW) completed intake and safety assessment with patient (Pt) remotely from [facility] after Pt verbally agreed  Pt admits to feeling suicidal for several months  She stated that she recently moved in a new group home at [facility] about a week ago and the transition has been stressful  Pt admits to feeling depressed and isolated as she has to stay inside her residence due to the Covid-19 pandemic and cannot see her boyfriend, Oj Gonzales who resides at another group home  Pt admits to a command auditory hallcuination telling her to kill herself and the voice has been present and constant for months  Pt admits she attempted suicide 12 times in past with most recent in April 2020 by overdoing on someone else's medications  Pt denies homicidal ideation  Pt lists her supports as her boyfriend who she converses with over the phone  Pt is seeking inpatient mental health treatment and asmits to a superficial cut on her arm done by a plastic fork a few days ago

## 2021-01-23 NOTE — ED CARE HANDOFF
Emergency Department Sign Out Note        Sign out and transfer of care from Dr Amara Walker  See Separate Emergency Department note  The patient, Cara Francisco, was evaluated by the previous provider for suicidal ideation  Workup Completed:  Patient medically cleared and evaluated by Crisis  201 signed  Placement pending  ED Course / Workup Pending (followup): Home medications ordered  Patient continues to await placement  Procedures  MDM    Disposition  Final diagnoses:   None     ED Disposition     None      MD Documentation      Most Recent Value   Sending MD  Dr Donna Quezada    None       Patient's Medications   Discharge Prescriptions    No medications on file     No discharge procedures on file         ED Provider  Electronically Signed by     Gloria Thomason MD  01/23/21 4107

## 2021-01-23 NOTE — ED NOTES
CW called [facility] and spoke to [employer] and she stated to fax [phone] and chart for review as they have one bed left  CW told Patience that he will fax info needed to them soon

## 2021-01-23 NOTE — ED NOTES
CW called [facility] and spoke to Wendy Mcmillan who stated that they will review but will not have a bed until Monday  She stated they will call back after review

## 2021-01-23 NOTE — ED NOTES
Pt ambulated to bathroom at this time with no difficulties, provided with recliner, drink and snack per pt request       Priya Kulkarni  01/23/21 5774

## 2021-01-23 NOTE — ED NOTES
Pt given breakfast tray pt does not want to wake up to eat tray sitting on bedside table      Rosalee Bullard  01/23/21 1010
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
## 2021-01-23 NOTE — ED NOTES
On the phone with crisis, isaias tray at bedside  States that she has no appetite       Deirdre Muñiz RN  01/23/21 4853

## 2021-01-24 PROCEDURE — 99284 EMERGENCY DEPT VISIT MOD MDM: CPT | Performed by: PSYCHIATRY & NEUROLOGY

## 2021-01-24 RX ORDER — LORAZEPAM 0.5 MG/1
0.5 TABLET ORAL ONCE
Status: COMPLETED | OUTPATIENT
Start: 2021-01-24 | End: 2021-01-24

## 2021-01-24 RX ADMIN — VALPROIC ACID 500 MG: 250 CAPSULE, LIQUID FILLED ORAL at 22:00

## 2021-01-24 RX ADMIN — ATORVASTATIN CALCIUM 40 MG: 40 TABLET, FILM COATED ORAL at 16:11

## 2021-01-24 RX ADMIN — LISINOPRIL 20 MG: 5 TABLET ORAL at 08:30

## 2021-01-24 RX ADMIN — ZIPRASIDONE HYDROCHLORIDE 60 MG: 60 CAPSULE ORAL at 08:29

## 2021-01-24 RX ADMIN — ASPIRIN 81 MG: 81 TABLET, DELAYED RELEASE ORAL at 08:30

## 2021-01-24 RX ADMIN — ZIPRASIDONE HYDROCHLORIDE 60 MG: 60 CAPSULE ORAL at 19:09

## 2021-01-24 RX ADMIN — LORAZEPAM 0.5 MG: 0.5 TABLET ORAL at 13:49

## 2021-01-24 RX ADMIN — FLUOXETINE 40 MG: 10 CAPSULE ORAL at 08:31

## 2021-01-24 RX ADMIN — PRAZOSIN HYDROCHLORIDE 1 MG: 1 CAPSULE ORAL at 22:05

## 2021-01-24 RX ADMIN — PANTOPRAZOLE SODIUM 20 MG: 20 TABLET, DELAYED RELEASE ORAL at 07:02

## 2021-01-24 NOTE — ED NOTES
Pt resting comfortably and denies any complaints  Pt states she is still hearing voices that are telling her to kill herself  Snacks and drink provided to patient at this time  Will continue to monitor        Jim Mins  01/23/21 2019

## 2021-01-24 NOTE — ED NOTES
Bed Search Efforts    [facility] per Radha Bennett, no beds  [facility]  [place]- Per Hardy, may have bed, chart reviewd patient has exhausted therapeutic benefits at this facility   [address]- Per Mohit, no beds  [place]- Per Dane, no beds  [place]- Per Jason Kennedy, no beds  [place]- Per [place], no beds  [place]- Per Lorri, no beds  [place]- no psych beds  [facility]- Per Jakub Martinez, patient has exhausted her medicare days and they are not in contract with secondary [facility]  [facility]- no admissions overnight  [place]- Per [place]- No beds  [facility]- no beds         Patient has exhausted Medicare Days and will need to go to facility in network with [facility] and pre cert through them

## 2021-01-24 NOTE — ED NOTES
Pt ambulated to restroom  No complaints at present  Pt went back to bed        David Walsh RN  01/24/21 6028

## 2021-01-24 NOTE — ED CARE HANDOFF
Emergency Department Sign Out Note        Sign out and transfer of care from Dr Savage Serve  See Separate Emergency Department note  The patient, Cara Francisco, was evaluated by the previous provider for suicidal ideation  Workup Completed:  Patient medically cleared and crisis consulted  201 signed  Awaiting placement  Evaluated by psychiatry today  ED Course / Workup Pending (followup): Patient continues to await placement  Procedures  MDM    Disposition  Final diagnoses:   Suicidal ideation     Time reflects when diagnosis was documented in both MDM as applicable and the Disposition within this note     Time User Action Codes Description Comment    1/24/2021  1:04 AM Demar Wolf Add [G23 [id number]] Suicidal ideation       ED Disposition     None      MD Documentation      Most Recent Value   Sending MD Dr Donna Quezada    None       Patient's Medications   Discharge Prescriptions    No medications on file     No discharge procedures on file         ED Provider  Electronically Signed by     Gloria Thomason MD  01/25/21 0110

## 2021-01-24 NOTE — ED CARE HANDOFF
Emergency Department Sign Out Note        Sign out and transfer of care from [address] [place] [address] 110**  See Separate Emergency Department note  The patient, Glorious Blade, was evaluated by the previous provider for suicidal ideation  Workup Completed:  Uds, etoh, covid-19 negative    ED Course / Workup Pending (followup):  201 signed, bed search in place  15 minute checks, cooperative  Routine meds ordered  Consult to psych placed and I texted psychiatry 8 am 1-24  ED Course as of Jan 24 1557   Sun Jan 24, 2021   9221 Psych Arpromise Pace called back - he is aware of consult      [address] [place] [address] from [facility] states probable hold until tomorrow      1336 Anxious with suicidal thoughts - will give ativan 0 5 mg po      1555 Signed out to [place] - suicidal ideation, signed 201, bed search - continual observation        Procedures  MDM  Number of Diagnoses or Management Options  Suicidal ideation: new and requires workup     Amount and/or Complexity of Data Reviewed  Clinical lab tests: reviewed  Obtain history from someone other than the patient: yes  Discuss the patient with other providers: yes    Patient Progress  Patient progress: stable      Disposition  Final diagnoses:   Suicidal ideation     Time reflects when diagnosis was documented in both MDM as applicable and the Disposition within this note     Time User Action Codes Description Comment    1/24/2021  1:04 AM Can Kay Add [V36 [id number]] Suicidal ideation       ED Disposition     None      MD Documentation      Most Recent Value   Sending MD Dr Jennifer Yun    None       Patient's Medications   Discharge Prescriptions    No medications on file     No discharge procedures on file         ED Provider  Electronically Signed by     Sha Giron DO  01/24/21 3764
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
## 2021-01-24 NOTE — ED NOTES
Spoke with Laureano George at [facility] regarding status of referral  Pt is still being reviewed at this time and a decision will not be made until Pt Medicare days available can be verified closer to day shift  Admissions will follow up with [facility] crisis when a decision is made

## 2021-01-25 VITALS
OXYGEN SATURATION: 96 % | RESPIRATION RATE: 17 BRPM | SYSTOLIC BLOOD PRESSURE: 117 MMHG | HEART RATE: 77 BPM | TEMPERATURE: 97.6 F | DIASTOLIC BLOOD PRESSURE: 70 MMHG

## 2021-01-25 RX ADMIN — ZIPRASIDONE HYDROCHLORIDE 60 MG: 60 CAPSULE ORAL at 08:10

## 2021-01-25 RX ADMIN — LISINOPRIL 20 MG: 5 TABLET ORAL at 08:11

## 2021-01-25 RX ADMIN — ASPIRIN 81 MG: 81 TABLET, DELAYED RELEASE ORAL at 08:11

## 2021-01-25 RX ADMIN — VALPROIC ACID 500 MG: 250 CAPSULE, LIQUID FILLED ORAL at 08:11

## 2021-01-25 RX ADMIN — PANTOPRAZOLE SODIUM 20 MG: 20 TABLET, DELAYED RELEASE ORAL at 06:11

## 2021-01-25 RX ADMIN — FLUOXETINE 40 MG: 10 CAPSULE ORAL at 08:11

## 2021-01-25 NOTE — ED NOTES
Patient is accepted at [facility]  Patient is accepted by Dr Michelle Pablo per Jeannette 60 is arranged with TBD   Transportation is scheduled for TBD  Patient may go to the floor at [address] [facility] is working on transport and will call back once patient's  time as been arranged, if [facility] calls ED please notify crisis worker of time so they can coordinate with facility      No nurse report needed for this facility

## 2021-01-25 NOTE — ED NOTES
Bed Search [facility]- Per Charli Maxwell, no beds at this time  [facility]- Per [facility], no beds  [facility]- Per Fermin Robison, no beds, also does not take [address] [place]- Per Alejandro Young, possible discharge bed willing to review, chart faxed at this time, bed called and cancelled  [address]- Per Yasmany Holley, no beds   [facility]- Per Lisa, no beds  [facility]- denied previously and not willing to re-review  [place]- Per Eddie Matthews, possible bed, chart faxed, willing to review and patient has been accepted, see accepting note

## 2021-01-25 NOTE — ED NOTES
Transportation is arranged with [facility]  Transportation is scheduled for 1100  Patient may go to the floor at 1100    Crisis informed Bri[facility] [address]25 admissions of p/u time

## 2021-01-25 NOTE — EMTALA/ACUTE CARE TRANSFER
[facility] EMERGENCY DEPARTMENT  [address] [facility] [address]  Dept: [phone]      EMTALA TRANSFER CONSENT    NAME Leydi Pugh                                         1970                              MRN [id number]    I have been informed of my rights regarding examination, treatment, and transfer   by Dr Gretta Whitlock att  providers found    Benefits:      Risks:        Transfer Request   I acknowledge that my medical condition has been evaluated and explained to me by the emergency department physician or other qualified medical person and/or my attending physician who has recommended and offered to me further medical examination and treatment  I understand the Hospital's obligation with respect to the treatment and stabilization of my emergency medical condition  I nevertheless request to be transferred  I release the Hospital, the doctor, and any other persons caring for me from all responsibility or liability for any injury or ill effects that may result from my transfer and agree to accept all responsibility for the consequences of my choice to transfer, rather than receive stabilizing treatment at the Hospital  I understand that because the transfer is my request, my insurance may not provide reimbursement for the services  The Hospital will assist and direct me and my family in how to make arrangements for transfer, but the hospital is not liable for any fees charged by the transport service  In spite of this understanding, I refuse to consent to further medical examination and treatment which has been offered to me, and request transfer to    I authorize the performance of emergency medical procedures and treatments upon me in both transit and upon arrival at the receiving facility  Additionally, I authorize the release of any and all medical records to the receiving facility and request they be transported with me, if possible      I authorize the performance of emergency medical procedures and treatments upon me in both transit and upon arrival at the receiving facility  Additionally, I authorize the release of any and all medical records to the receiving facility and request they be transported with me, if possible  I understand that the safest mode of transportation during a medical emergency is an ambulance and that the Hospital advocates the use of this mode of transport  Risks of traveling to the receiving facility by car, including absence of medical control, life sustaining equipment, such as oxygen, and medical personnel has been explained to me and I fully understand them  (PRAKASH CORRECT BOX BELOW)  [  ]  I consent to the stated transfer and to be transported by ambulance/helicopter  [  ]  I consent to the stated transfer, but refuse transportation by ambulance and accept full responsibility for my transportation by car  I understand the risks of non-ambulance transfers and I exonerate the Hospital and its staff from any deterioration in my condition that results from this refusal     X___________________________________________    DATE  21  TIME________  Signature of patient or legally responsible individual signing on patient behalf           RELATIONSHIP TO PATIENT_________________________          Provider Certification    NAME Leydi Myers                                        [facility] 1970                              MRN [id number]    A medical screening exam was performed on the above named patient  Based on the examination:    Condition Necessitating Transfer The encounter diagnosis was Suicidal ideation      Patient Condition:      Reason for Transfer:      Transfer Requirements: Facility     · Space available and qualified personnel available for treatment as acknowledged by    · Agreed to accept transfer and to provide appropriate medical treatment as acknowledged by          · Appropriate medical records of the examination and treatment of the patient are provided at the time of transfer   [address] [facility] _______  · Transfer will be performed by qualified personnel from    and appropriate transfer equipment as required, including the use of necessary and appropriate life support measures  Provider Certification: I have examined the patient and explained the following risks and benefits of being transferred/refusing transfer to the patient/family:         Based on these reasonable risks and benefits to the patient and/or the unborn child(courtney), and based upon the information available at the time of the patients examination, I certify that the medical benefits reasonably to be expected from the provision of appropriate medical treatments at another medical facility outweigh the increasing risks, if any, to the individuals medical condition, and in the case of labor to the unborn child, from effecting the transfer      X____________________________________________ DATE 01/25/21        TIME_______      ORIGINAL - SEND TO MEDICAL RECORDS   COPY - SEND WITH PATIENT DURING TRANSFER

## 2021-01-25 NOTE — ED NOTES
Insurance Authorization for admission:   Phone call placed to [facility]  Phone number: [phone]  Spoke to [place] [address]     10 days approved  Level of care: Inpatient Psych 201  Review on 2/3  Authorization # [id number]  EVS (Eligibility Verification System) called - [phone]  Automated system indicates: active with [address] [place] [address] for Transportation:  Not needed if Pt is scheduled with [facility]

## 2021-01-25 NOTE — ED NOTES
Spoke with Aamir Hughes at [facility] regarding transport  [facility] will contact [facility] in AM with transport time for [facility]  [facility] will need to be updated on ETA at that time

## 2021-01-25 NOTE — ED NOTES
Pt woken for vital signs  No complaints at present  Offered food and drink, but pt refused at this time       Butch Medellin, RN  01/25/21 8359

## 2021-02-20 ENCOUNTER — HOSPITAL ENCOUNTER (EMERGENCY)
Facility: HOSPITAL | Age: 51
End: 2021-02-21
Attending: EMERGENCY MEDICINE | Admitting: EMERGENCY MEDICINE
Payer: MEDICARE

## 2021-02-20 DIAGNOSIS — R45.851 SUICIDAL IDEATION: ICD-10-CM

## 2021-02-20 DIAGNOSIS — F32.A DEPRESSION: Primary | ICD-10-CM

## 2021-02-20 LAB
ALBUMIN SERPL BCP-MCNC: 3.5 G/DL (ref 3.5–5)
ALP SERPL-CCNC: 91 U/L (ref 46–116)
ALT SERPL W P-5'-P-CCNC: 21 U/L (ref 12–78)
AMPHETAMINES SERPL QL SCN: NEGATIVE
ANION GAP SERPL CALCULATED.3IONS-SCNC: 8 MMOL/L (ref 4–13)
AST SERPL W P-5'-P-CCNC: 11 U/L (ref 5–45)
BARBITURATES UR QL: NEGATIVE
BASOPHILS # BLD AUTO: 0.04 THOUSANDS/ΜL (ref 0–0.1)
BASOPHILS NFR BLD AUTO: 1 % (ref 0–1)
BENZODIAZ UR QL: NEGATIVE
BILIRUB SERPL-MCNC: 0.8 MG/DL (ref 0.2–1)
BUN SERPL-MCNC: 11 MG/DL (ref 5–25)
CALCIUM SERPL-MCNC: 8.9 MG/DL (ref 8.3–10.1)
CHLORIDE SERPL-SCNC: 99 MMOL/L (ref 100–108)
CO2 SERPL-SCNC: 30 MMOL/L (ref 21–32)
COCAINE UR QL: NEGATIVE
CREAT SERPL-MCNC: 0.64 MG/DL (ref 0.6–1.3)
EOSINOPHIL # BLD AUTO: 0.13 THOUSAND/ΜL (ref 0–0.61)
EOSINOPHIL NFR BLD AUTO: 2 % (ref 0–6)
ERYTHROCYTE [DISTWIDTH] IN BLOOD BY AUTOMATED COUNT: 15.1 % (ref 11.6–15.1)
ETHANOL EXG-MCNC: 0 MG/DL
FLUAV RNA RESP QL NAA+PROBE: NEGATIVE
FLUBV RNA RESP QL NAA+PROBE: NEGATIVE
GFR SERPL CREATININE-BSD FRML MDRD: 104 ML/MIN/1.73SQ M
GLUCOSE SERPL-MCNC: 93 MG/DL (ref 65–140)
HCT VFR BLD AUTO: 36.3 % (ref 34.8–46.1)
HGB BLD-MCNC: 11.9 G/DL (ref 11.5–15.4)
IMM GRANULOCYTES # BLD AUTO: 0.17 THOUSAND/UL (ref 0–0.2)
IMM GRANULOCYTES NFR BLD AUTO: 2 % (ref 0–2)
LYMPHOCYTES # BLD AUTO: 1.74 THOUSANDS/ΜL (ref 0.6–4.47)
LYMPHOCYTES NFR BLD AUTO: 24 % (ref 14–44)
MCH RBC QN AUTO: 31.2 PG (ref 26.8–34.3)
MCHC RBC AUTO-ENTMCNC: 32.8 G/DL (ref 31.4–37.4)
MCV RBC AUTO: 95 FL (ref 82–98)
METHADONE UR QL: NEGATIVE
MONOCYTES # BLD AUTO: 1.04 THOUSAND/ΜL (ref 0.17–1.22)
MONOCYTES NFR BLD AUTO: 14 % (ref 4–12)
NEUTROPHILS # BLD AUTO: 4.27 THOUSANDS/ΜL (ref 1.85–7.62)
NEUTS SEG NFR BLD AUTO: 57 % (ref 43–75)
NRBC BLD AUTO-RTO: 0 /100 WBCS
OPIATES UR QL SCN: NEGATIVE
OXYCODONE+OXYMORPHONE UR QL SCN: NEGATIVE
PCP UR QL: NEGATIVE
PLATELET # BLD AUTO: 201 THOUSANDS/UL (ref 149–390)
PMV BLD AUTO: 10 FL (ref 8.9–12.7)
POTASSIUM SERPL-SCNC: 4.3 MMOL/L (ref 3.5–5.3)
PROT SERPL-MCNC: 7.6 G/DL (ref 6.4–8.2)
RBC # BLD AUTO: 3.82 MILLION/UL (ref 3.81–5.12)
RSV RNA RESP QL NAA+PROBE: NEGATIVE
SARS-COV-2 RNA RESP QL NAA+PROBE: NEGATIVE
SODIUM SERPL-SCNC: 137 MMOL/L (ref 136–145)
THC UR QL: NEGATIVE
VALPROATE SERPL-MCNC: 52 UG/ML (ref 50–100)
WBC # BLD AUTO: 7.39 THOUSAND/UL (ref 4.31–10.16)

## 2021-02-20 PROCEDURE — 0241U HB NFCT DS VIR RESP RNA 4 TRGT: CPT | Performed by: EMERGENCY MEDICINE

## 2021-02-20 PROCEDURE — 80053 COMPREHEN METABOLIC PANEL: CPT | Performed by: EMERGENCY MEDICINE

## 2021-02-20 PROCEDURE — 80164 ASSAY DIPROPYLACETIC ACD TOT: CPT | Performed by: EMERGENCY MEDICINE

## 2021-02-20 PROCEDURE — 80307 DRUG TEST PRSMV CHEM ANLYZR: CPT | Performed by: EMERGENCY MEDICINE

## 2021-02-20 PROCEDURE — 82075 ASSAY OF BREATH ETHANOL: CPT | Performed by: EMERGENCY MEDICINE

## 2021-02-20 PROCEDURE — 99285 EMERGENCY DEPT VISIT HI MDM: CPT

## 2021-02-20 PROCEDURE — 85025 COMPLETE CBC W/AUTO DIFF WBC: CPT | Performed by: EMERGENCY MEDICINE

## 2021-02-20 PROCEDURE — 99285 EMERGENCY DEPT VISIT HI MDM: CPT | Performed by: EMERGENCY MEDICINE

## 2021-02-20 PROCEDURE — 36415 COLL VENOUS BLD VENIPUNCTURE: CPT | Performed by: EMERGENCY MEDICINE

## 2021-02-20 PROCEDURE — 93005 ELECTROCARDIOGRAM TRACING: CPT

## 2021-02-20 RX ORDER — LANOLIN ALCOHOL/MO/W.PET/CERES
3 CREAM (GRAM) TOPICAL
Status: DISCONTINUED | OUTPATIENT
Start: 2021-02-20 | End: 2021-02-21 | Stop reason: HOSPADM

## 2021-02-20 RX ORDER — CLOZAPINE 25 MG/1
12.5 TABLET ORAL
Status: DISCONTINUED | OUTPATIENT
Start: 2021-02-20 | End: 2021-02-21 | Stop reason: HOSPADM

## 2021-02-20 RX ORDER — CLOZAPINE 25 MG/1
12.5 TABLET ORAL
Status: ON HOLD | COMMUNITY
End: 2021-10-10

## 2021-02-20 RX ORDER — ACETAMINOPHEN 325 MG/1
650 TABLET ORAL EVERY 8 HOURS PRN
Status: DISCONTINUED | OUTPATIENT
Start: 2021-02-20 | End: 2021-02-21 | Stop reason: HOSPADM

## 2021-02-20 RX ORDER — FLUOXETINE 10 MG/1
40 CAPSULE ORAL DAILY
Status: DISCONTINUED | OUTPATIENT
Start: 2021-02-21 | End: 2021-02-21 | Stop reason: HOSPADM

## 2021-02-20 RX ORDER — LANOLIN ALCOHOL/MO/W.PET/CERES
3 CREAM (GRAM) TOPICAL
Status: ON HOLD | COMMUNITY
End: 2021-10-10

## 2021-02-20 RX ORDER — LISINOPRIL 5 MG/1
20 TABLET ORAL ONCE
Status: COMPLETED | OUTPATIENT
Start: 2021-02-20 | End: 2021-02-20

## 2021-02-20 RX ORDER — HALOPERIDOL 5 MG
5 TABLET ORAL 2 TIMES DAILY
Status: ON HOLD | COMMUNITY
End: 2021-10-10

## 2021-02-20 RX ORDER — ATORVASTATIN CALCIUM 40 MG/1
40 TABLET, FILM COATED ORAL
Status: DISCONTINUED | OUTPATIENT
Start: 2021-02-20 | End: 2021-02-21 | Stop reason: HOSPADM

## 2021-02-20 RX ORDER — PRAZOSIN HYDROCHLORIDE 1 MG/1
1 CAPSULE ORAL
Status: DISCONTINUED | OUTPATIENT
Start: 2021-02-20 | End: 2021-02-21 | Stop reason: HOSPADM

## 2021-02-20 RX ORDER — HALOPERIDOL 5 MG
5 TABLET ORAL 2 TIMES DAILY
Status: DISCONTINUED | OUTPATIENT
Start: 2021-02-20 | End: 2021-02-21 | Stop reason: HOSPADM

## 2021-02-20 RX ORDER — OLANZAPINE 5 MG/1
20 TABLET, ORALLY DISINTEGRATING ORAL
Status: DISCONTINUED | OUTPATIENT
Start: 2021-02-20 | End: 2021-02-21 | Stop reason: HOSPADM

## 2021-02-20 RX ORDER — ASPIRIN 81 MG/1
81 TABLET, CHEWABLE ORAL EVERY MORNING
Status: DISCONTINUED | OUTPATIENT
Start: 2021-02-21 | End: 2021-02-21 | Stop reason: HOSPADM

## 2021-02-20 RX ORDER — DIVALPROEX SODIUM 250 MG/1
500 TABLET, DELAYED RELEASE ORAL 2 TIMES DAILY
Status: DISCONTINUED | OUTPATIENT
Start: 2021-02-20 | End: 2021-02-21 | Stop reason: HOSPADM

## 2021-02-20 RX ADMIN — OLANZAPINE 20 MG: 5 TABLET, ORALLY DISINTEGRATING ORAL at 21:54

## 2021-02-20 RX ADMIN — LISINOPRIL 20 MG: 5 TABLET ORAL at 19:22

## 2021-02-20 RX ADMIN — MELATONIN 3 MG: at 21:55

## 2021-02-20 RX ADMIN — PRAZOSIN HYDROCHLORIDE 1 MG: 1 CAPSULE ORAL at 21:55

## 2021-02-20 RX ADMIN — ATORVASTATIN CALCIUM 40 MG: 40 TABLET, FILM COATED ORAL at 19:23

## 2021-02-20 RX ADMIN — DIVALPROEX SODIUM 500 MG: 250 TABLET, DELAYED RELEASE ORAL at 21:54

## 2021-02-20 RX ADMIN — CLOZAPINE 12.5 MG: 25 TABLET ORAL at 21:55

## 2021-02-20 RX ADMIN — HALOPERIDOL 5 MG: 5 TABLET ORAL at 19:23
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
## 2021-02-20 NOTE — ED PROVIDER NOTES
History  Chief Complaint   Patient presents with    Psychiatric Evaluation     Patient states that she feels suicidal and wants to cut her self     This is a 64-year-old female who presents via ambulance from [facility] for crisis evaluation  Patient has a history of schizophrenia bipolar disease and posttraumatic stress disorder  She states that she is having thoughts of cutting her throat secondary to depression over relationships and life stress in general   Denies any visual or auditory hallucinations she was admitted to [address] [place] [address] approximately 1 month ago with similar complaints      History provided by:  Patient and EMS personnel  Medical Problem  Location:  Generalized  Quality:  Depression and suicidal ideation  Onset quality:  Gradual  Duration:  2 days  Timing:  Constant  Progression:  Worsening  Chronicity:  Recurrent  Context:  Depression and suicidal ideations over the past 2 days patient comes from a long-term psychiatric facility  Relieved by:  Nothing  Worsened by:  Stress      Prior to Admission Medications   Prescriptions Last Dose Informant Patient Reported? Taking?    FLUoxetine (PROzac) 20 mg capsule   No No   Sig: Take 3 capsules (60 mg total) by mouth daily At 9am   Patient taking differently: Take 40 mg by mouth daily At 9am   OLANZapine (ZyPREXA) 20 MG tablet   Yes No   Sig: Take 20 mg by mouth daily at bedtime   acetaminophen (TYLENOL) 325 mg tablet   No No   Sig: Take 2 tablets (650 mg total) by mouth every 6 (six) hours as needed for mild pain   aspirin (ECOTRIN LOW STRENGTH) 81 mg EC tablet   No No   Sig: Take 1 tablet (81 mg total) by mouth daily At 9am   atorvastatin (LIPITOR) 40 mg tablet   Yes No   Sig: Take 40 mg by mouth daily   cloZAPine (CLOZARIL) 25 mg tablet   Yes Yes   Sig: Take 12 5 mg by mouth daily at bedtime   divalproex sodium (DEPAKOTE) 500 mg EC tablet   No No   Sig: Take 1 tablet (500 mg total) by mouth every 12 (twelve) hours At 9am and 9pm   haloperidol (HALDOL) 5 mg tablet   Yes Yes   Sig: Take 5 mg by mouth 2 (two) times a day   lisinopril (ZESTRIL) 20 mg tablet   Yes No   Sig: Take 20 mg by mouth   melatonin 3 mg   Yes Yes   Sig: Take 3 mg by mouth daily at bedtime   prazosin (MINIPRESS) 1 mg capsule   No No   Sig: Take 1 capsule (1 mg total) by mouth daily at bedtime Take one capsule by mouth at bedtime for dreams / sleep disturbances  Facility-Administered Medications: None       Past Medical History:   Diagnosis Date    Bipolar 1 disorder ([place] [address] )     Borderline personality disorder ([place] [address] )     CAD (coronary artery disease)     Dyslipidemia     GERD (gastroesophageal reflux disease)     Hypertension     Obesity     Psychiatric disorder     PTSD (post-traumatic stress disorder)     Schizoaffective disorder ([place] [address] )     Seizure ([place] [address] )        Past Surgical History:   Procedure Laterality Date    APPENDECTOMY      CHOLECYSTECTOMY      FOOT SURGERY Right        History reviewed  No pertinent family history  I have reviewed and agree with the history as documented  E-Cigarette/Vaping    E-Cigarette Use Never User      E-Cigarette/Vaping Substances    Nicotine No     THC No     CBD No     Flavoring No     Other No     Unknown No      Social History     Tobacco Use    Smoking status: Current Every Day Smoker    Smokeless tobacco: Never Used   Substance Use Topics    Alcohol use: Not Currently    Drug use: Not Currently       Review of Systems   Psychiatric/Behavioral: Positive for dysphoric mood and suicidal ideas  The patient is nervous/anxious  All other systems reviewed and are negative  Physical Exam  Physical Exam  Vitals signs and nursing note reviewed  Constitutional:       General: She is not in acute distress  Appearance: She is obese  She is not ill-appearing, toxic-appearing or diaphoretic  HENT:      Head: Normocephalic and atraumatic        Right Ear: External ear normal       Left Ear: External ear normal  Nose: Nose normal    Eyes:      General: No scleral icterus  Right eye: No discharge  Left eye: No discharge  Extraocular Movements: Extraocular movements intact  Pupils: Pupils are equal, round, and reactive to light  Neck:      Musculoskeletal: Normal range of motion and neck supple  No neck rigidity or muscular tenderness  Cardiovascular:      Rate and Rhythm: Normal rate and regular rhythm  Pulses: Normal pulses  Heart sounds: Normal heart sounds  No murmur  No friction rub  No gallop  Pulmonary:      Effort: Pulmonary effort is normal  No respiratory distress  Breath sounds: Normal breath sounds  No stridor  No wheezing, rhonchi or rales  Abdominal:      General: Abdomen is flat  Bowel sounds are normal  There is no distension  Palpations: Abdomen is soft  Tenderness: There is no abdominal tenderness  There is no guarding or rebound  Musculoskeletal: Normal range of motion  General: No tenderness or signs of injury  Right lower leg: Edema present  Left lower leg: Edema present  Skin:     General: Skin is warm and dry  Findings: No rash  Neurological:      General: No focal deficit present  Mental Status: She is alert and oriented to person, place, and time  Cranial Nerves: No cranial nerve deficit  Sensory: No sensory deficit        Coordination: Coordination normal    Psychiatric:      Comments: Depression with suicidal ideation to cut her throat         Vital Signs  ED Triage Vitals [02/20/21 1726]   Temperature Pulse Respirations Blood Pressure SpO2   99 2 °F (37 3 °C) 79 18 115/80 96 %      Temp Source Heart Rate Source Patient Position - Orthostatic VS BP Location FiO2 (%)   Oral -- Lying Left arm --      Pain Score       --           Vitals:    02/20/21 1726 02/20/21 1922 02/20/21 1927 02/20/21 2155   BP: 115/80 132/63 132/63 115/63   Pulse: 79  84    Patient Position - Orthostatic VS: Lying Visual Acuity      ED Medications  Medications   aspirin chewable tablet 81 mg (81 mg Oral Given 2/21/21 0935)   acetaminophen (TYLENOL) tablet 650 mg (has no administration in time range)   atorvastatin (LIPITOR) tablet 40 mg (40 mg Oral Given 2/20/21 1923)   cloZAPine (CLOZARIL) tablet 12 5 mg (12 5 mg Oral Given 2/20/21 2155)   divalproex sodium (DEPAKOTE) EC tablet 500 mg (500 mg Oral Given 2/21/21 0935)   FLUoxetine (PROzac) capsule 40 mg (40 mg Oral Given 2/21/21 0934)   haloperidol (HALDOL) tablet 5 mg (5 mg Oral Given 2/21/21 0935)   melatonin tablet 3 mg (3 mg Oral Given 2/20/21 2155)   OLANZapine (ZyPREXA ZYDIS) dispersible tablet 20 mg (20 mg Oral Given 2/20/21 2154)   prazosin (MINIPRESS) capsule 1 mg (1 mg Oral Given 2/20/21 2155)   lisinopril (ZESTRIL) tablet 20 mg (20 mg Oral Given 2/20/21 1922)       Diagnostic Studies  Results Reviewed     Procedure Component Value Units Date/Time    COVID19, Influenza A/B, RSV PCR, [facility] [[id number]]  (Normal) Collected: 02/20/21 1744    Lab Status: Final result Specimen: Nares from Nasopharyngeal Swab Updated: 02/20/21 1845     SARS-CoV-2 Negative     INFLUENZA A PCR Negative     INFLUENZA B PCR Negative     RSV PCR Negative    Narrative: This test has been authorized by FDA under an EUA (Emergency Use Assay) for use by authorized laboratories  Clinical caution and judgement should be used with the interpretation of these results with consideration of the clinical impression and other laboratory testing  Testing reported as "Positive" or "Negative" has been proven to be accurate according to standard laboratory validation requirements  All testing is performed with control materials showing appropriate reactivity at standard intervals      POCT pregnancy, urine [[id number]]     Lab Status: No result     Rapid drug screen, urine [[id number]]  (Normal) Collected: 02/20/21 1744    Lab Status: Final result Specimen: Urine, Clean Catch Updated: 02/20/21 1813     Amph/Meth UR Negative     Barbiturate Ur Negative     Benzodiazepine Urine Negative     Cocaine Urine Negative     Methadone Urine Negative     Opiate Urine Negative     PCP Ur Negative     THC Urine Negative     Oxycodone Urine Negative    Narrative:      FOR MEDICAL PURPOSES ONLY  IF CONFIRMATION NEEDED PLEASE CONTACT THE LAB WITHIN 5 DAYS      Drug Screen Cutoff Levels:  AMPHETAMINE/METHAMPHETAMINES  1000 ng/mL  BARBITURATES     200 ng/mL  BENZODIAZEPINES     200 ng/mL  COCAINE      300 ng/mL  METHADONE      300 ng/mL  OPIATES      300 ng/mL  PHENCYCLIDINE     25 ng/mL  THC       50 ng/mL  OXYCODONE      100 ng/mL    Valproic acid level, total [[id number]]  (Normal) Collected: 02/20/21 1739    Lab Status: Final result Specimen: Blood from Arm, Right Updated: 02/20/21 1813     Valproic Acid, Total 52 ug/mL     Comprehensive metabolic panel [[id number]]  (Abnormal) Collected: 02/20/21 1739    Lab Status: Final result Specimen: Blood from Arm, Right Updated: 02/20/21 1812     Sodium 137 mmol/L      Potassium 4 3 mmol/L      Chloride 99 mmol/L      CO2 30 mmol/L      ANION GAP 8 mmol/L      BUN 11 mg/dL      Creatinine 0 64 mg/dL      Glucose 93 mg/dL      Calcium 8 9 mg/dL      AST 11 U/L      ALT 21 U/L      Alkaline Phosphatase 91 U/L      Total Protein 7 6 g/dL      Albumin 3 5 g/dL      Total Bilirubin 0 80 mg/dL      eGFR 104 ml/min/1 73sq m     Narrative:      [facility] guidelines for Chronic Kidney Disease (CKD):     Stage 1 with normal or high GFR (GFR > 90 mL/min/1 73 square meters)    Stage 2 Mild CKD (GFR = 60-89 mL/min/1 73 square meters)    Stage 3A Moderate CKD (GFR = 45-59 mL/min/1 73 square meters)    Stage 3B Moderate CKD (GFR = 30-44 mL/min/1 73 square meters)    Stage 4 Severe CKD (GFR = 15-29 mL/min/1 73 square meters)    Stage 5 End Stage CKD (GFR <15 mL/min/1 73 square meters)  Note: GFR calculation is accurate only with a steady state creatinine    CBC and differential [[id number]]  (Abnormal) Collected: 02/20/21 1739    Lab Status: Final result Specimen: Blood from Arm, Right Updated: 02/20/21 1753     WBC 7 39 Thousand/uL      RBC 3 82 Million/uL      Hemoglobin 11 9 g/dL      Hematocrit 36 3 %      MCV 95 fL      MCH 31 2 pg      MCHC 32 8 g/dL      RDW 15 1 %      MPV 10 0 fL      Platelets 101 Thousands/uL      nRBC 0 /100 WBCs      Neutrophils Relative 57 %      Immat GRANS % 2 %      Lymphocytes Relative 24 %      Monocytes Relative 14 %      Eosinophils Relative 2 %      Basophils Relative 1 %      Neutrophils Absolute 4 27 Thousands/µL      Immature Grans Absolute 0 17 Thousand/uL      Lymphocytes Absolute 1 74 Thousands/µL      Monocytes Absolute 1 04 Thousand/µL      Eosinophils Absolute 0 13 Thousand/µL      Basophils Absolute 0 04 Thousands/µL     POCT alcohol breath test [[id number]]  (Normal) Resulted: 02/20/21 1747    Lab Status: Final result Updated: 02/20/21 1747     EXTBreath Alcohol 0 000                 No orders to display              Procedures  ECG 12 Lead Documentation Only    Date/Time: 2/20/2021 5:52 PM  Performed by: Marcin Delacruz DO  Authorized by: Marcin Delacruz DO     ECG reviewed by me, the ED Provider: yes    Patient location:  ED  Rate:     ECG rate:  80  Rhythm:     Rhythm: sinus rhythm    Conduction:     Conduction: normal    T waves:     T waves: normal               ED Course  ED Course as of Feb 21 1212   Sat Feb 20, 2021   1816 Patient is medically cleared for psychiatric admission                                              MDM  Number of Diagnoses or Management Options  Diagnosis management comments: Depression with suicidal ideations here for medical clearance and crisis evaluation       Amount and/or Complexity of Data Reviewed  Clinical lab tests: ordered        Disposition  Final diagnoses:   Depression   Suicidal ideation     Time reflects when diagnosis was documented in both MDM as applicable and the Disposition within this note     Time User Action Codes Description Comment    2/20/2021  5:35 PM Marian Glover [F32 9] Depression     2/20/2021  5:35 PM Hayden Puga [J96 [id number]] Suicidal ideation       ED Disposition     ED Disposition Condition Date/Time Comment    Transfer to [facility] Feb 20, 2021 10:23 PM Warden Saavedra should be transferred out to [facility] and has been medically cleared          MD Documentation      Most Recent Value   Patient Condition  The patient has been stabilized such that within reasonable medical probability, no material deterioration of the patient condition or the condition of the unborn child(courtney) is likely to result from the transfer   Reason for Transfer  Level of Care needed not available at this facility   Benefits of Transfer  Specialized equipment and/or services available at the receiving facility (Include comment)________________________   Risks of Transfer  Potential for delay in receiving treatment, Potential deterioration of medical condition   Accepting Physician  Dr Syed Mendez NameSapphire   Sending MD  Dr Mansoor Crawford      RN Documentation      [address] Name, [place]      Follow-up Information    None         Discharge Medication List as of 2/21/2021 10:29 AM      CONTINUE these medications which have NOT CHANGED    Details   cloZAPine (CLOZARIL) 25 mg tablet Take 12 5 mg by mouth daily at bedtime, Historical Med      haloperidol (HALDOL) 5 mg tablet Take 5 mg by mouth 2 (two) times a day, Historical Med      melatonin 3 mg Take 3 mg by mouth daily at bedtime, Historical Med      acetaminophen (TYLENOL) 325 mg tablet Take 2 tablets (650 mg total) by mouth every 6 (six) hours as needed for mild pain, Starting Fri 10/16/2020, Print      aspirin (ECOTRIN LOW STRENGTH) 81 mg EC tablet Take 1 tablet (81 mg total) by mouth daily At 9am, Starting Mon 1/18/2021, No Print      atorvastatin (LIPITOR) 40 mg tablet Take 40 mg by mouth daily, Historical Med      divalproex sodium (DEPAKOTE) 500 mg EC tablet Take 1 tablet (500 mg total) by mouth every 12 (twelve) hours At 9am and 9pm, Starting Mon 1/18/2021, Normal      FLUoxetine (PROzac) 20 mg capsule Take 3 capsules (60 mg total) by mouth daily At 9am, Starting Mon 1/18/2021, Normal      lisinopril (ZESTRIL) 20 mg tablet Take 20 mg by mouth, Starting Wed 12/16/2020, Historical Med      OLANZapine (ZyPREXA) 20 MG tablet Take 20 mg by mouth daily at bedtime, Historical Med      prazosin (MINIPRESS) 1 mg capsule Take 1 capsule (1 mg total) by mouth daily at bedtime Take one capsule by mouth at bedtime for dreams / sleep disturbances  , Starting Mon 1/18/2021, Normal           No discharge procedures on file      PDMP Review     None          ED Provider  Electronically Signed by           Geoffrey Bravo DO  02/21/21 8629

## 2021-02-20 NOTE — ED NOTES
Bed Search to following facilities:     [facility]: No beds available  [place]: Spoke with [facility], possible bed availability; chart faxed for review  [facility]: No female beds available  [facility]: Possible beds available; chart faxed for review  [facility]: Patient has exhausted therapeutic benefits  [place]: No answer in admissions      RORY Scott  02/20/21   0016

## 2021-02-20 NOTE — ED NOTES
Per EVS, patient has Medicare primary and [facility] secondary coverage  As of patient's last admission, it was noted that she has exhausted Medicare days and will require placement at a facility in-network with [facility] (ID# [id number])       Chidi Urrutia, RORY  02/20/21   1041

## 2021-02-21 VITALS
RESPIRATION RATE: 16 BRPM | OXYGEN SATURATION: 99 % | BODY MASS INDEX: 48.06 KG/M2 | SYSTOLIC BLOOD PRESSURE: 115 MMHG | TEMPERATURE: 99.2 F | WEIGHT: 279.98 LBS | DIASTOLIC BLOOD PRESSURE: 63 MMHG | HEART RATE: 84 BPM

## 2021-02-21 LAB
ATRIAL RATE: 80 BPM
P AXIS: 32 DEGREES
PR INTERVAL: 208 MS
QRS AXIS: 49 DEGREES
QRSD INTERVAL: 82 MS
QT INTERVAL: 372 MS
QTC INTERVAL: 429 MS
T WAVE AXIS: 36 DEGREES
VENTRICULAR RATE: 80 BPM

## 2021-02-21 PROCEDURE — 93010 ELECTROCARDIOGRAM REPORT: CPT | Performed by: INTERNAL MEDICINE

## 2021-02-21 RX ADMIN — DIVALPROEX SODIUM 500 MG: 250 TABLET, DELAYED RELEASE ORAL at 09:35

## 2021-02-21 RX ADMIN — HALOPERIDOL 5 MG: 5 TABLET ORAL at 09:35

## 2021-02-21 RX ADMIN — ASPIRIN 81 MG: 81 TABLET, CHEWABLE ORAL at 09:35

## 2021-02-21 RX ADMIN — FLUOXETINE 40 MG: 10 CAPSULE ORAL at 09:34

## 2021-02-21 NOTE — ED NOTES
Patient is accepted at [facility]  Patient is accepted by Dr Minh Miranda per [facility] is arranged with [facility]   Transportation is scheduled for 10am  Patient may go to the floor after 10am (21st)        Nurse report is not required for this facility

## 2021-02-21 NOTE — ED NOTES
Insurance Authorization for admission:   Phone call placed to [facility])  Phone number: [phone]  Spoke to [place]     10 days approved  Level of care: [facility]  Review on 03/02/2021  Authorization # [id number]       EVS (Eligibility Verification System) called - [phone]    Automated system indicates: Medicare A and B(exhausted days) [address] for Transportation:    Transportation is arranged with [facility]

## 2021-02-21 NOTE — EMTALA/ACUTE CARE TRANSFER
[facility] EMERGENCY DEPARTMENT  [address] [facility] [address]  Dept: [phone]      EMTALA TRANSFER CONSENT    NAME Leydi Fulton                                         1970                              MRN [id number]    I have been informed of my rights regarding examination, treatment, and transfer   by Dr Wilton Blount DO    Benefits: Specialized equipment and/or services available at the receiving facility (Include comment)________________________    Risks: Potential for delay in receiving treatment, Potential deterioration of medical condition      Consent for Transfer:  I acknowledge that my medical condition has been evaluated and explained to me by the emergency department physician or other qualified medical person and/or my attending physician, who has recommended that I be transferred to the service of  Accepting Physician: Dr Fredy Randle at [address] [facility] Name, [address] : [facility]  The above potential benefits of such transfer, the potential risks associated with such transfer, and the probable risks of not being transferred have been explained to me, and I fully understand them  The doctor has explained that, in my case, the benefits of transfer outweigh the risks  I agree to be transferred  I authorize the performance of emergency medical procedures and treatments upon me in both transit and upon arrival at the receiving facility  Additionally, I authorize the release of any and all medical records to the receiving facility and request they be transported with me, if possible  I understand that the safest mode of transportation during a medical emergency is an ambulance and that the Hospital advocates the use of this mode of transport   Risks of traveling to the receiving facility by car, including absence of medical control, life sustaining equipment, such as oxygen, and medical personnel has been explained to me and I fully understand them     ([address] [facility])  [  ]  I consent to the stated transfer and to be transported by ambulance/helicopter  [  ]  I consent to the stated transfer, but refuse transportation by ambulance and accept full responsibility for my transportation by car  I understand the risks of non-ambulance transfers and I exonerate the Hospital and its staff from any deterioration in my condition that results from this refusal     X___________________________________________    DATE  21  TIME________  Signature of patient or legally responsible individual signing on patient behalf           RELATIONSHIP TO PATIENT_________________________          Provider Certification    NAME Leydi Fulton                                         1970                              MRN [id number]    A medical screening exam was performed on the above named patient  Based on the examination:    Condition Necessitating Transfer The primary encounter diagnosis was Depression  A diagnosis of Suicidal ideation was also pertinent to this visit  Patient Condition: The patient has been stabilized such that within reasonable medical probability, no material deterioration of the patient condition or the condition of the unborn child(courtney) is likely to result from the transfer    Reason for Transfer: Level of Care needed not available at this facility    Transfer Requirements: [address]   · Space available and qualified personnel available for treatment as acknowledged by    · Agreed to accept transfer and to provide appropriate medical treatment as acknowledged by       Dr Fredy Randle  · Appropriate medical records of the examination and treatment of the patient are provided at the time of transfer   [address] [facility], [address] _______  · Transfer will be performed by qualified personnel from    and appropriate transfer equipment as required, including the use of necessary and appropriate life support measures      Provider Certification: I have examined the patient and explained the following risks and benefits of being transferred/refusing transfer to the patient/family:         Based on these reasonable risks and benefits to the patient and/or the unborn child(courtney), and based upon the information available at the time of the patients examination, I certify that the medical benefits reasonably to be expected from the provision of appropriate medical treatments at another medical facility outweigh the increasing risks, if any, to the individuals medical condition, and in the case of labor to the unborn child, from effecting the transfer      X____________________________________________ DATE 02/20/21        TIME_______      ORIGINAL - SEND TO MEDICAL RECORDS   COPY - SEND WITH PATIENT DURING TRANSFER

## 2021-05-20 ENCOUNTER — HOSPITAL ENCOUNTER (EMERGENCY)
Facility: HOSPITAL | Age: 51
Discharge: HOME | End: 2021-05-20
Attending: EMERGENCY MEDICINE
Payer: MEDICARE

## 2021-05-20 VITALS
RESPIRATION RATE: 18 BRPM | HEART RATE: 74 BPM | SYSTOLIC BLOOD PRESSURE: 123 MMHG | TEMPERATURE: 97.3 F | DIASTOLIC BLOOD PRESSURE: 70 MMHG | OXYGEN SATURATION: 99 % | WEIGHT: 279 LBS | BODY MASS INDEX: 47.89 KG/M2

## 2021-05-20 DIAGNOSIS — R45.851 SUICIDAL IDEATIONS: ICD-10-CM

## 2021-05-20 DIAGNOSIS — F32.A DEPRESSION: Primary | ICD-10-CM

## 2021-05-20 LAB
ALBUMIN SERPL BCP-MCNC: 3.6 G/DL (ref 3.5–5)
ALP SERPL-CCNC: 75 U/L (ref 46–116)
ALT SERPL W P-5'-P-CCNC: 26 U/L (ref 12–78)
AMPHETAMINES SERPL QL SCN: NEGATIVE
ANION GAP SERPL CALCULATED.3IONS-SCNC: 8 MMOL/L (ref 4–13)
AST SERPL W P-5'-P-CCNC: 15 U/L (ref 5–45)
BARBITURATES UR QL: NEGATIVE
BASOPHILS # BLD AUTO: 0.05 THOUSANDS/ΜL (ref 0–0.1)
BASOPHILS NFR BLD AUTO: 1 % (ref 0–1)
BENZODIAZ UR QL: NEGATIVE
BILIRUB SERPL-MCNC: 0.6 MG/DL (ref 0.2–1)
BUN SERPL-MCNC: 12 MG/DL (ref 5–25)
CALCIUM SERPL-MCNC: 8.6 MG/DL (ref 8.3–10.1)
CHLORIDE SERPL-SCNC: 105 MMOL/L (ref 100–108)
CO2 SERPL-SCNC: 29 MMOL/L (ref 21–32)
COCAINE UR QL: NEGATIVE
CREAT SERPL-MCNC: 0.74 MG/DL (ref 0.6–1.3)
EOSINOPHIL # BLD AUTO: 0.13 THOUSAND/ΜL (ref 0–0.61)
EOSINOPHIL NFR BLD AUTO: 2 % (ref 0–6)
ERYTHROCYTE [DISTWIDTH] IN BLOOD BY AUTOMATED COUNT: 14.9 % (ref 11.6–15.1)
ETHANOL EXG-MCNC: 0 MG/DL
ETHANOL SERPL-MCNC: <3 MG/DL (ref 0–3)
GFR SERPL CREATININE-BSD FRML MDRD: 95 ML/MIN/1.73SQ M
GLUCOSE SERPL-MCNC: 109 MG/DL (ref 65–140)
HCT VFR BLD AUTO: 36.2 % (ref 34.8–46.1)
HGB BLD-MCNC: 12.1 G/DL (ref 11.5–15.4)
IMM GRANULOCYTES # BLD AUTO: 0.16 THOUSAND/UL (ref 0–0.2)
IMM GRANULOCYTES NFR BLD AUTO: 2 % (ref 0–2)
LYMPHOCYTES # BLD AUTO: 1.94 THOUSANDS/ΜL (ref 0.6–4.47)
LYMPHOCYTES NFR BLD AUTO: 23 % (ref 14–44)
MCH RBC QN AUTO: 32.4 PG (ref 26.8–34.3)
MCHC RBC AUTO-ENTMCNC: 33.4 G/DL (ref 31.4–37.4)
MCV RBC AUTO: 97 FL (ref 82–98)
METHADONE UR QL: NEGATIVE
MONOCYTES # BLD AUTO: 0.74 THOUSAND/ΜL (ref 0.17–1.22)
MONOCYTES NFR BLD AUTO: 9 % (ref 4–12)
NEUTROPHILS # BLD AUTO: 5.33 THOUSANDS/ΜL (ref 1.85–7.62)
NEUTS SEG NFR BLD AUTO: 63 % (ref 43–75)
NRBC BLD AUTO-RTO: 0 /100 WBCS
OPIATES UR QL SCN: NEGATIVE
OXYCODONE+OXYMORPHONE UR QL SCN: NEGATIVE
PCP UR QL: NEGATIVE
PLATELET # BLD AUTO: 198 THOUSANDS/UL (ref 149–390)
PMV BLD AUTO: 9.7 FL (ref 8.9–12.7)
POTASSIUM SERPL-SCNC: 4.2 MMOL/L (ref 3.5–5.3)
PROT SERPL-MCNC: 8 G/DL (ref 6.4–8.2)
RBC # BLD AUTO: 3.73 MILLION/UL (ref 3.81–5.12)
SODIUM SERPL-SCNC: 142 MMOL/L (ref 136–145)
THC UR QL: NEGATIVE
TSH SERPL DL<=0.05 MIU/L-ACNC: 1.73 UIU/ML (ref 0.36–3.74)
WBC # BLD AUTO: 8.35 THOUSAND/UL (ref 4.31–10.16)

## 2021-05-20 PROCEDURE — 80053 COMPREHEN METABOLIC PANEL: CPT | Performed by: EMERGENCY MEDICINE

## 2021-05-20 PROCEDURE — 84443 ASSAY THYROID STIM HORMONE: CPT | Performed by: EMERGENCY MEDICINE

## 2021-05-20 PROCEDURE — 80307 DRUG TEST PRSMV CHEM ANLYZR: CPT | Performed by: EMERGENCY MEDICINE

## 2021-05-20 PROCEDURE — 99284 EMERGENCY DEPT VISIT MOD MDM: CPT | Performed by: EMERGENCY MEDICINE

## 2021-05-20 PROCEDURE — 99284 EMERGENCY DEPT VISIT MOD MDM: CPT

## 2021-05-20 PROCEDURE — 82077 ASSAY SPEC XCP UR&BREATH IA: CPT | Performed by: EMERGENCY MEDICINE

## 2021-05-20 PROCEDURE — 85025 COMPLETE CBC W/AUTO DIFF WBC: CPT | Performed by: EMERGENCY MEDICINE

## 2021-05-20 PROCEDURE — 36415 COLL VENOUS BLD VENIPUNCTURE: CPT | Performed by: EMERGENCY MEDICINE

## 2021-05-20 PROCEDURE — 82075 ASSAY OF BREATH ETHANOL: CPT | Performed by: EMERGENCY MEDICINE

## 2021-05-20 RX ORDER — VALPROIC ACID 250 MG/1
500 CAPSULE, LIQUID FILLED ORAL 4 TIMES DAILY
Status: ON HOLD | COMMUNITY
End: 2021-10-14 | Stop reason: SDUPTHER

## 2021-05-20 RX ORDER — PANTOPRAZOLE SODIUM 40 MG/1
40 TABLET, DELAYED RELEASE ORAL
COMMUNITY
Start: 2020-12-15 | End: 2021-10-15 | Stop reason: HOSPADM

## 2021-05-20 NOTE — ED PROVIDER NOTES
History  Chief Complaint   Patient presents with    Psychiatric Evaluation     To ED for evaluation of depression  Patient states that no one cares for her at her facility and this makes her want to hurt herself  States that sh ehad to stratch her arm today "just to get sent to the hospital"      73-year-old female from [address] has history depression, schizophrenia, bipolar disorder posttraumatic stress disorder  She says she always hears her voice in her head telling her that she  Is fat, is dumb, etcetera  This made her feel suicidal today  She cut her left forearm with a today  She says she wants around front of traffic  She says she is not sure why is happening again to her  She denies any change in medications lately  She denies any recent illness  Prior to Admission Medications   Prescriptions Last Dose Informant Patient Reported? Taking?    FLUoxetine (PROzac) 20 mg capsule   No No   Sig: Take 3 capsules (60 mg total) by mouth daily At 9am   Patient taking differently: Take 40 mg by mouth daily At 9am   OLANZapine (ZyPREXA) 20 MG tablet   Yes No   Sig: Take 20 mg by mouth daily at bedtime   acetaminophen (TYLENOL) 325 mg tablet   No No   Sig: Take 2 tablets (650 mg total) by mouth every 6 (six) hours as needed for mild pain   aspirin (ECOTRIN LOW STRENGTH) 81 mg EC tablet   No No   Sig: Take 1 tablet (81 mg total) by mouth daily At 9am   atorvastatin (LIPITOR) 40 mg tablet   Yes No   Sig: Take 40 mg by mouth daily   cloZAPine (CLOZARIL) 25 mg tablet   Yes No   Sig: Take 12 5 mg by mouth daily at bedtime   haloperidol (HALDOL) 5 mg tablet   Yes No   Sig: Take 5 mg by mouth 2 (two) times a day   lisinopril (ZESTRIL) 20 mg tablet   Yes No   Sig: Take 20 mg by mouth   melatonin 3 mg   Yes No   Sig: Take 3 mg by mouth daily at bedtime   pantoprazole (PROTONIX) 20 mg tablet   Yes Yes   Sig: Take 20 mg by mouth   prazosin (MINIPRESS) 1 mg capsule   No No   Sig: Take 1 capsule (1 mg total) by mouth daily at bedtime Take one capsule by mouth at bedtime for dreams / sleep disturbances  valproic acid (DEPAKENE) 250 mg capsule   Yes No   Sig: Take 500 mg by mouth 4 (four) times a day      Facility-Administered Medications: None       Past Medical History:   Diagnosis Date    Bipolar 1 disorder ([place] [address] )     Borderline personality disorder ([place] [address] )     CAD (coronary artery disease)     Dyslipidemia     GERD (gastroesophageal reflux disease)     Hypertension     Obesity     Psychiatric disorder     PTSD (post-traumatic stress disorder)     Schizoaffective disorder ([place] [address] )     Seizure ([place] [address] )        Past Surgical History:   Procedure Laterality Date    APPENDECTOMY      CHOLECYSTECTOMY      FOOT SURGERY Right        History reviewed  No pertinent family history  I have reviewed and agree with the history as documented  E-Cigarette/Vaping    E-Cigarette Use Never User      E-Cigarette/Vaping Substances    Nicotine No     THC No     CBD No     Flavoring No     Other No     Unknown No      Social History     Tobacco Use    Smoking status: Current Every Day Smoker    Smokeless tobacco: Never Used   Substance Use Topics    Alcohol use: Not Currently    Drug use: Not Currently       Review of Systems   Constitutional: Negative  HENT: Negative  Eyes: Negative  Respiratory: Negative  Cardiovascular: Negative  Gastrointestinal: Negative  Endocrine: Negative  Genitourinary: Negative  Musculoskeletal: Negative  Skin: Negative  Allergic/Immunologic: Negative  Neurological: Negative  Hematological: Negative  Psychiatric/Behavioral: Positive for behavioral problems, decreased concentration, dysphoric mood, hallucinations, self-injury, sleep disturbance and suicidal ideas  All other systems reviewed and are negative  Physical Exam  Physical Exam  Vitals signs and nursing note reviewed  Constitutional:       Appearance: She is well-developed   She is obese  She is not ill-appearing  HENT:      Head: Normocephalic and atraumatic  Nose: Nose normal    Eyes:      Conjunctiva/sclera: Conjunctivae normal       Pupils: Pupils are equal, round, and reactive to light  Neck:      Musculoskeletal: Normal range of motion and neck supple  No neck rigidity or muscular tenderness  Cardiovascular:      Rate and Rhythm: Normal rate and regular rhythm  Pulses: Normal pulses  Heart sounds: No murmur  Pulmonary:      Effort: Pulmonary effort is normal       Breath sounds: Normal breath sounds  Abdominal:      General: Bowel sounds are normal       Palpations: Abdomen is soft  Tenderness: There is no abdominal tenderness  Musculoskeletal: Normal range of motion  General: Signs of injury (  Several parallel linear superficial abrasions along the left ulna ) present  Right lower leg: No edema  Left lower leg: No edema  Skin:     General: Skin is warm and dry  Capillary Refill: Capillary refill takes less than 2 seconds  Findings: Lesion ( superficial abrasions left forearm) present  No rash  Neurological:      General: No focal deficit present  Mental Status: She is alert and oriented to person, place, and time  Mental status is at baseline  Cranial Nerves: No cranial nerve deficit  Coordination: Coordination normal       Deep Tendon Reflexes: Reflexes are normal and symmetric  Psychiatric:         Attention and Perception: Attention and perception normal          Mood and Affect: Affect is flat  Behavior: Behavior normal  Behavior is cooperative  Thought Content: Thought content includes suicidal ideation  Thought content includes suicidal plan           Vital Signs  ED Triage Vitals [05/20/21 1821]   Temperature Pulse Respirations Blood Pressure SpO2   (!) 97 3 °F (36 3 °C) 74 18 123/70 99 %      Temp Source Heart Rate Source Patient Position - Orthostatic VS BP Location FiO2 (%) Tympanic Monitor Lying Right arm --      Pain Score       --           Vitals:    05/20/21 1821   BP: 123/70   Pulse: 74   Patient Position - Orthostatic VS: Lying         Visual Acuity      ED Medications  Medications - No data to display    Diagnostic Studies  Results Reviewed     Procedure Component Value Units Date/Time    TSH [[id number]]  (Normal) Collected: 05/20/21 1836    Lab Status: Final result Specimen: Blood from Arm, Right Updated: 05/20/21 1956     TSH 3RD GENERATON 1 728 uIU/mL     Narrative:      Patients undergoing fluorescein dye angiography may retain small amounts of fluorescein in the body for 48-72 hours post procedure  Samples containing fluorescein can produce falsely depressed TSH values  If the patient had this procedure,a specimen should be resubmitted post fluorescein clearance  Ethanol [[id number]]  (Normal) Collected: 05/20/21 1836    Lab Status: Final result Specimen: Blood from Arm, Right Updated: 05/20/21 1931     Ethanol Lvl <3 mg/dL     Rapid drug screen, urine [[id number]]  (Normal) Collected: 05/20/21 1836    Lab Status: Final result Specimen: Urine, Clean Catch Updated: 05/20/21 1900     Amph/Meth UR Negative     Barbiturate Ur Negative     Benzodiazepine Urine Negative     Cocaine Urine Negative     Methadone Urine Negative     Opiate Urine Negative     PCP Ur Negative     THC Urine Negative     Oxycodone Urine Negative    Narrative:      FOR MEDICAL PURPOSES ONLY  IF CONFIRMATION NEEDED PLEASE CONTACT THE LAB WITHIN 5 DAYS      Drug Screen Cutoff Levels:  AMPHETAMINE/METHAMPHETAMINES  1000 ng/mL  BARBITURATES     200 ng/mL  BENZODIAZEPINES     200 ng/mL  COCAINE      300 ng/mL  METHADONE      300 ng/mL  OPIATES      300 ng/mL  PHENCYCLIDINE     25 ng/mL  THC       50 ng/mL  OXYCODONE      100 ng/mL    Comprehensive metabolic panel [[id number]] Collected: 05/20/21 1836    Lab Status: Final result Specimen: Blood from Arm, Right Updated: 05/20/21 1858     Sodium 142 mmol/L Potassium 4 2 mmol/L      Chloride 105 mmol/L      CO2 29 mmol/L      ANION GAP 8 mmol/L      BUN 12 mg/dL      Creatinine 0 74 mg/dL      Glucose 109 mg/dL      Calcium 8 6 mg/dL      AST 15 U/L      ALT 26 U/L      Alkaline Phosphatase 75 U/L      Total Protein 8 0 g/dL      Albumin 3 6 g/dL      Total Bilirubin 0 60 mg/dL      eGFR 95 ml/min/1 73sq m     Narrative:      National Kid[employer] Disease Foundation guidelines for Chronic Kidney Disease (CKD):     Stage 1 with normal or high GFR (GFR > 90 mL/min/1 73 square meters)    Stage 2 Mild CKD (GFR = 60-89 mL/min/1 73 square meters)    Stage 3A Moderate CKD (GFR = 45-59 mL/min/1 73 square meters)    Stage 3B Moderate CKD (GFR = 30-44 mL/min/1 73 square meters)    Stage 4 Severe CKD (GFR = 15-29 mL/min/1 73 square meters)    Stage 5 End Stage CKD (GFR <15 mL/min/1 73 square meters)  Note: GFR calculation is accurate only with a steady state creatinine    CBC and differential [[id number]]  (Abnormal) Collected: 05/20/21 1836    Lab Status: Final result Specimen: Blood from Arm, Right Updated: 05/20/21 1842     WBC 8 35 Thousand/uL      RBC 3 73 Million/uL      Hemoglobin 12 1 g/dL      Hematocrit 36 2 %      MCV 97 fL      MCH 32 4 pg      MCHC 33 4 g/dL      RDW 14 9 %      MPV 9 7 fL      Platelets 062 Thousands/uL      nRBC 0 /100 WBCs      Neutrophils Relative 63 %      Immat GRANS % 2 %      Lymphocytes Relative 23 %      Monocytes Relative 9 %      Eosinophils Relative 2 %      Basophils Relative 1 %      Neutrophils Absolute 5 33 Thousands/µL      Immature Grans Absolute 0 16 Thousand/uL      Lymphocytes Absolute 1 94 Thousands/µL      Monocytes Absolute 0 74 Thousand/µL      Eosinophils Absolute 0 13 Thousand/µL      Basophils Absolute 0 05 Thousands/µL     POCT alcohol breath test [[id number]]  (Normal) Resulted: 05/20/21 1828    Lab Status: Final result Updated: 05/20/21 1828     EXTBreath Alcohol 0 000                 No orders to display Procedures  Procedures         ED Course  ED Course as of May 20 2121   Thu May 20, 2021   2111   Seen and interviewed by crisis worker  Case discussed with her  Patient is suitable for discharge to group home with continued outpatient therapy and medication management  MDM  Number of Diagnoses or Management Options  Depression: established and worsening  Suicidal ideations: established and improving  Diagnosis management comments:   71-year-old female with bipolar depression, schizophrenia and PTSD seen here very frequently stating she wants to go back into the hospital    She called the ambulance herself tonight  She often talks about suicidal desires but it seems to be this is awaiting get back into hospital   Patient has appropriate outpatient follow-up in his a group home  She is currently on medication  Deemed stable for transfer back to the group home  Staff a group home feels that she does not need hospitalization either  Amount and/or Complexity of Data Reviewed  Clinical lab tests: ordered and reviewed  Review and summarize past medical records: yes  Discuss the patient with other providers: yes  Independent visualization of images, tracings, or specimens: yes        Disposition  Final diagnoses:   Depression   Suicidal ideations     Time reflects when diagnosis was documented in both MDM as applicable and the Disposition within this note     Time User Action Codes Description Comment    5/20/2021  9:16 PM Ree Greenhouse Add [F32 9] Depression     5/20/2021  9:16 PM Ree Greenhouse Add [H79 [id number]] Suicidal ideations     5/20/2021  9:16 PM Bryant Stevevard Suicidal ideations     5/20/2021  9:16 PM Ree Greenhouse Add [G43 [id number]] Suicidal ideations       ED Disposition     ED Disposition Condition Date/Time Comment    Discharge Stable Thu May 20, 2021  9:16 PM Leydi Xiong discharge to home/self care              Follow-up Information     Follow up With Specialties Details Why [facility] Call in 2 days  [address]  [phone]            Patient's Medications   Discharge Prescriptions    No medications on file     No discharge procedures on file      PDMP Review     None          ED Provider  Electronically Signed by           Lane Christensen DO  05/20/21 8125

## 2021-05-21 NOTE — ED NOTES
Patient is well known in the department and typically comes in seeking inpatient treatment when she is upset about something at her group home  She states she enjoys inpatient stays  She reports she was most recently admitted 3-4 weeks ago and has been in the hospital many times this year as well as previous years  She has no specific reason that she wants admitted tonight  She says she feels like she needs inpatient help  She has no specific plan to harm herself  She has outpatient services (psychiatrist and therapist), and lives in a supportive living program  No HI, no psychosis  No specific SI or self-harm plan

## 2021-05-21 NOTE — ED NOTES
Called Domonique Leon for [facility] for ride home, direct number given to staff member to contact KOLBY Rangel RN  05/20/21 7017

## 2021-05-21 NOTE — ED NOTES
Mohini Rivers to report that this patient is ready for discharge  Was told they would try to find someone to come pick her up, and would let us know

## 2021-05-21 NOTE — DISCHARGE INSTRUCTIONS
Continue your present medications  Continue with therapy  Contact your therapist Monday, sooner if worse

## 2021-06-06 ENCOUNTER — HOSPITAL ENCOUNTER (EMERGENCY)
Facility: HOSPITAL | Age: 51
Discharge: HOME | End: 2021-06-07
Attending: EMERGENCY MEDICINE | Admitting: EMERGENCY MEDICINE
Payer: MEDICARE

## 2021-06-06 DIAGNOSIS — R45.851 SUICIDAL IDEATION: Primary | ICD-10-CM

## 2021-06-06 PROBLEM — F39 MOOD DISORDER (HCC): Status: ACTIVE | Noted: 2020-11-29

## 2021-06-06 LAB
AMPHETAMINES SERPL QL SCN: NEGATIVE
ANION GAP SERPL CALCULATED.3IONS-SCNC: 10 MMOL/L (ref 4–13)
BARBITURATES UR QL: NEGATIVE
BASOPHILS # BLD AUTO: 0.06 THOUSANDS/ΜL (ref 0–0.1)
BASOPHILS NFR BLD AUTO: 1 % (ref 0–1)
BENZODIAZ UR QL: NEGATIVE
BILIRUB UR QL STRIP: NEGATIVE
BUN SERPL-MCNC: 11 MG/DL (ref 5–25)
CALCIUM SERPL-MCNC: 8.6 MG/DL (ref 8.3–10.1)
CHLORIDE SERPL-SCNC: 105 MMOL/L (ref 100–108)
CLARITY UR: CLEAR
CO2 SERPL-SCNC: 29 MMOL/L (ref 21–32)
COCAINE UR QL: NEGATIVE
COLOR UR: YELLOW
CREAT SERPL-MCNC: 0.61 MG/DL (ref 0.6–1.3)
EOSINOPHIL # BLD AUTO: 0.16 THOUSAND/ΜL (ref 0–0.61)
EOSINOPHIL NFR BLD AUTO: 2 % (ref 0–6)
ERYTHROCYTE [DISTWIDTH] IN BLOOD BY AUTOMATED COUNT: 15.5 % (ref 11.6–15.1)
ETHANOL EXG-MCNC: 0 MG/DL
GFR SERPL CREATININE-BSD FRML MDRD: 106 ML/MIN/1.73SQ M
GLUCOSE SERPL-MCNC: 106 MG/DL (ref 65–140)
GLUCOSE UR STRIP-MCNC: NEGATIVE MG/DL
HCT VFR BLD AUTO: 33.3 % (ref 34.8–46.1)
HGB BLD-MCNC: 11 G/DL (ref 11.5–15.4)
HGB UR QL STRIP.AUTO: NEGATIVE
IMM GRANULOCYTES # BLD AUTO: 0.19 THOUSAND/UL (ref 0–0.2)
IMM GRANULOCYTES NFR BLD AUTO: 2 % (ref 0–2)
KETONES UR STRIP-MCNC: NEGATIVE MG/DL
LEUKOCYTE ESTERASE UR QL STRIP: NEGATIVE
LYMPHOCYTES # BLD AUTO: 2.22 THOUSANDS/ΜL (ref 0.6–4.47)
LYMPHOCYTES NFR BLD AUTO: 24 % (ref 14–44)
MCH RBC QN AUTO: 32.8 PG (ref 26.8–34.3)
MCHC RBC AUTO-ENTMCNC: 33 G/DL (ref 31.4–37.4)
MCV RBC AUTO: 99 FL (ref 82–98)
METHADONE UR QL: NEGATIVE
MONOCYTES # BLD AUTO: 1.02 THOUSAND/ΜL (ref 0.17–1.22)
MONOCYTES NFR BLD AUTO: 11 % (ref 4–12)
NEUTROPHILS # BLD AUTO: 5.68 THOUSANDS/ΜL (ref 1.85–7.62)
NEUTS SEG NFR BLD AUTO: 60 % (ref 43–75)
NITRITE UR QL STRIP: NEGATIVE
NRBC BLD AUTO-RTO: 0 /100 WBCS
OPIATES UR QL SCN: NEGATIVE
OXYCODONE+OXYMORPHONE UR QL SCN: NEGATIVE
PCP UR QL: NEGATIVE
PH UR STRIP.AUTO: 6 [PH]
PLATELET # BLD AUTO: 206 THOUSANDS/UL (ref 149–390)
PMV BLD AUTO: 9.5 FL (ref 8.9–12.7)
POTASSIUM SERPL-SCNC: 3.9 MMOL/L (ref 3.5–5.3)
PROT UR STRIP-MCNC: NEGATIVE MG/DL
RBC # BLD AUTO: 3.35 MILLION/UL (ref 3.81–5.12)
SARS-COV-2 RNA RESP QL NAA+PROBE: NEGATIVE
SODIUM SERPL-SCNC: 144 MMOL/L (ref 136–145)
SP GR UR STRIP.AUTO: 1.02 (ref 1–1.03)
THC UR QL: NEGATIVE
TSH SERPL DL<=0.05 MIU/L-ACNC: 1.97 UIU/ML (ref 0.36–3.74)
UROBILINOGEN UR QL STRIP.AUTO: 1 E.U./DL
VALPROATE SERPL-MCNC: 49 UG/ML (ref 50–100)
WBC # BLD AUTO: 9.33 THOUSAND/UL (ref 4.31–10.16)

## 2021-06-06 PROCEDURE — U0005 INFEC AGEN DETEC AMPLI PROBE: HCPCS | Performed by: EMERGENCY MEDICINE

## 2021-06-06 PROCEDURE — U0003 INFECTIOUS AGENT DETECTION BY NUCLEIC ACID (DNA OR RNA); SEVERE ACUTE RESPIRATORY SYNDROME CORONAVIRUS 2 (SARS-COV-2) (CORONAVIRUS DISEASE [COVID-19]), AMPLIFIED PROBE TECHNIQUE, MAKING USE OF HIGH THROUGHPUT TECHNOLOGIES AS DESCRIBED BY CMS-2020-01-R: HCPCS | Performed by: EMERGENCY MEDICINE

## 2021-06-06 PROCEDURE — 85025 COMPLETE CBC W/AUTO DIFF WBC: CPT | Performed by: EMERGENCY MEDICINE

## 2021-06-06 PROCEDURE — 93005 ELECTROCARDIOGRAM TRACING: CPT

## 2021-06-06 PROCEDURE — 80048 BASIC METABOLIC PNL TOTAL CA: CPT | Performed by: EMERGENCY MEDICINE

## 2021-06-06 PROCEDURE — 99285 EMERGENCY DEPT VISIT HI MDM: CPT

## 2021-06-06 PROCEDURE — 80164 ASSAY DIPROPYLACETIC ACD TOT: CPT | Performed by: EMERGENCY MEDICINE

## 2021-06-06 PROCEDURE — 82075 ASSAY OF BREATH ETHANOL: CPT | Performed by: EMERGENCY MEDICINE

## 2021-06-06 PROCEDURE — 99285 EMERGENCY DEPT VISIT HI MDM: CPT | Performed by: EMERGENCY MEDICINE

## 2021-06-06 PROCEDURE — 80307 DRUG TEST PRSMV CHEM ANLYZR: CPT | Performed by: EMERGENCY MEDICINE

## 2021-06-06 PROCEDURE — 36415 COLL VENOUS BLD VENIPUNCTURE: CPT | Performed by: EMERGENCY MEDICINE

## 2021-06-06 PROCEDURE — 81003 URINALYSIS AUTO W/O SCOPE: CPT | Performed by: EMERGENCY MEDICINE

## 2021-06-06 PROCEDURE — 84443 ASSAY THYROID STIM HORMONE: CPT | Performed by: EMERGENCY MEDICINE

## 2021-06-06 RX ORDER — VALPROIC ACID 250 MG/1
500 CAPSULE, LIQUID FILLED ORAL EVERY 12 HOURS SCHEDULED
Status: DISCONTINUED | OUTPATIENT
Start: 2021-06-06 | End: 2021-06-08 | Stop reason: HOSPADM

## 2021-06-06 RX ORDER — ACETAMINOPHEN 325 MG/1
650 TABLET ORAL EVERY 6 HOURS PRN
Status: DISCONTINUED | OUTPATIENT
Start: 2021-06-06 | End: 2021-06-08 | Stop reason: HOSPADM

## 2021-06-06 RX ORDER — ASPIRIN 81 MG/1
81 TABLET ORAL DAILY
Status: DISCONTINUED | OUTPATIENT
Start: 2021-06-07 | End: 2021-06-08 | Stop reason: HOSPADM

## 2021-06-06 RX ORDER — OLANZAPINE 10 MG/1
20 TABLET ORAL
Status: DISCONTINUED | OUTPATIENT
Start: 2021-06-06 | End: 2021-06-06

## 2021-06-06 RX ORDER — HALOPERIDOL 5 MG
5 TABLET ORAL 2 TIMES DAILY
Status: CANCELLED | OUTPATIENT
Start: 2021-06-06

## 2021-06-06 RX ORDER — LORAZEPAM 1 MG/1
1 TABLET ORAL ONCE
Status: COMPLETED | OUTPATIENT
Start: 2021-06-06 | End: 2021-06-06

## 2021-06-06 RX ORDER — LANOLIN ALCOHOL/MO/W.PET/CERES
3 CREAM (GRAM) TOPICAL
Status: DISCONTINUED | OUTPATIENT
Start: 2021-06-06 | End: 2021-06-08 | Stop reason: HOSPADM

## 2021-06-06 RX ORDER — FLUOXETINE 10 MG/1
40 CAPSULE ORAL DAILY
Status: DISCONTINUED | OUTPATIENT
Start: 2021-06-07 | End: 2021-06-08 | Stop reason: HOSPADM

## 2021-06-06 RX ORDER — CLOZAPINE 25 MG/1
12.5 TABLET ORAL
Status: CANCELLED | OUTPATIENT
Start: 2021-06-06

## 2021-06-06 RX ORDER — VALPROIC ACID 250 MG/1
500 CAPSULE, LIQUID FILLED ORAL EVERY 6 HOURS SCHEDULED
Status: DISCONTINUED | OUTPATIENT
Start: 2021-06-06 | End: 2021-06-06

## 2021-06-06 RX ORDER — ATORVASTATIN CALCIUM 40 MG/1
40 TABLET, FILM COATED ORAL
Status: DISCONTINUED | OUTPATIENT
Start: 2021-06-07 | End: 2021-06-08 | Stop reason: HOSPADM

## 2021-06-06 RX ORDER — LISINOPRIL 5 MG/1
20 TABLET ORAL DAILY
Status: DISCONTINUED | OUTPATIENT
Start: 2021-06-07 | End: 2021-06-08 | Stop reason: HOSPADM

## 2021-06-06 RX ORDER — PANTOPRAZOLE SODIUM 20 MG/1
20 TABLET, DELAYED RELEASE ORAL
Status: DISCONTINUED | OUTPATIENT
Start: 2021-06-07 | End: 2021-06-08 | Stop reason: HOSPADM

## 2021-06-06 RX ADMIN — LORAZEPAM 1 MG: 1 TABLET ORAL at 17:03

## 2021-06-06 RX ADMIN — VALPROIC ACID 500 MG: 250 CAPSULE, LIQUID FILLED ORAL at 22:29

## 2021-06-06 RX ADMIN — MELATONIN 3 MG: at 22:28

## 2021-06-06 RX ADMIN — OLANZAPINE 15 MG: 10 TABLET, FILM COATED ORAL at 22:28

## 2021-06-06 NOTE — ED NOTES
Patient reporting she does not like the group home where she has lived for several months  She wants to leave, and agrees that her desire not to be there likely contributes to her unhappiness and frequent desire for hospitalization  She says she needs more attention than she is given at the group home, and wishes to live elsewhere, but has been told she must live there for 5 or 6 months at least before she can try to get transferred to some other living situation  She says she most wishes to live with her boyfriend  He lived with her when she had an apartment, but both she and he were hospitalized often back then as well, per her report, and returning to that type of living arrangement seems not to be possible according to what she states  She denies HI and psychosis  She says she has frequent thoughts about how she is ugly or fat or unwanted, but states she only hears herself saying those things in her mind, not other voices  She is reporting a desire to stab herself in the neck with a fork and says she feels suicidal  She says she would not feel safe in any setting other than inpatient hospitalization

## 2021-06-06 NOTE — ED PROVIDER NOTES
History  Chief Complaint   Patient presents with    Psychiatric Evaluation     To ED via EMS for evaluation  Patient states that she does not like the staff at her facility  States that they pick on her and no one loves her there  "they tell me what to do all the time"     Patient with PMH bipolar, borderline brought in by ambulance from [facility] where she resides for thoughts of killing herself  She states that these thoughts have been continuous since the last she was here in May  They are triggered by staff picking on her to lose weight  She has thoughts of stabbing herself with a metal fork in her neck today  Patient would like to go to inpatient psych care  Prior to Admission Medications   Prescriptions Last Dose Informant Patient Reported? Taking? FLUoxetine (PROzac) 20 mg capsule   No No   Sig: Take 3 capsules (60 mg total) by mouth daily At 9am   Patient taking differently: Take 40 mg by mouth daily At 9am   OLANZapine (ZyPREXA) 20 MG tablet   Yes No   Sig: Take 20 mg by mouth daily at bedtime   acetaminophen (TYLENOL) 325 mg tablet   No No   Sig: Take 2 tablets (650 mg total) by mouth every 6 (six) hours as needed for mild pain   aspirin (ECOTRIN LOW STRENGTH) 81 mg EC tablet   No No   Sig: Take 1 tablet (81 mg total) by mouth daily At 9am   atorvastatin (LIPITOR) 40 mg tablet   Yes No   Sig: Take 40 mg by mouth daily   cloZAPine (CLOZARIL) 25 mg tablet   Yes No   Sig: Take 12 5 mg by mouth daily at bedtime   haloperidol (HALDOL) 5 mg tablet   Yes No   Sig: Take 5 mg by mouth 2 (two) times a day   lisinopril (ZESTRIL) 20 mg tablet   Yes No   Sig: Take 20 mg by mouth   melatonin 3 mg   Yes No   Sig: Take 3 mg by mouth daily at bedtime   pantoprazole (PROTONIX) 20 mg tablet   Yes No   Sig: Take 20 mg by mouth   prazosin (MINIPRESS) 1 mg capsule   No No   Sig: Take 1 capsule (1 mg total) by mouth daily at bedtime Take one capsule by mouth at bedtime for dreams / sleep disturbances  valproic acid (DEPAKENE) 250 mg capsule   Yes No   Sig: Take 500 mg by mouth 4 (four) times a day      Facility-Administered Medications: None       Past Medical History:   Diagnosis Date    Bipolar 1 disorder ([place] [address] )     Borderline personality disorder ([place] [address] )     CAD (coronary artery disease)     Dyslipidemia     GERD (gastroesophageal reflux disease)     Hypertension     Obesity     Psychiatric disorder     PTSD (post-traumatic stress disorder)     Schizoaffective disorder ([place] [address] )     Seizure ([place] [address] )        Past Surgical History:   Procedure Laterality Date    APPENDECTOMY      CHOLECYSTECTOMY      FOOT SURGERY Right        History reviewed  No pertinent family history  I have reviewed and agree with the history as documented  E-Cigarette/Vaping    E-Cigarette Use Never User      E-Cigarette/Vaping Substances    Nicotine No     THC No     CBD No     Flavoring No     Other No     Unknown No      Social History     Tobacco Use    Smoking status: Former Smoker    Smokeless tobacco: Never Used   Substance Use Topics    Alcohol use: Not Currently    Drug use: Not Currently       Review of Systems   Constitutional: Negative for chills and fever  HENT: Negative for rhinorrhea and sore throat  Respiratory: Negative for shortness of breath  Cardiovascular: Negative for chest pain  Gastrointestinal: Negative for constipation, diarrhea, nausea and vomiting  Genitourinary: Negative for dysuria and frequency  Skin: Negative for rash  Psychiatric/Behavioral: Positive for suicidal ideas  All other systems reviewed and are negative  Physical Exam  Physical Exam  Vitals signs and nursing note reviewed  Constitutional:       Appearance: She is well-developed  HENT:      Head: Normocephalic and atraumatic        Right Ear: External ear normal       Left Ear: External ear normal       Nose: Nose normal    Eyes:      Conjunctiva/sclera: Conjunctivae normal       Pupils: Pupils are equal, round, and reactive to light  Neck:      Musculoskeletal: Normal range of motion and neck supple  No spinous process tenderness  Cardiovascular:      Rate and Rhythm: Normal rate and regular rhythm  Heart sounds: Normal heart sounds  Pulmonary:      Effort: Pulmonary effort is normal  No respiratory distress  Breath sounds: Normal breath sounds  No wheezing  Abdominal:      General: Bowel sounds are normal  There is no distension  Palpations: Abdomen is soft  Tenderness: There is no abdominal tenderness  Musculoskeletal: Normal range of motion  General: No deformity  Skin:     General: Skin is warm and dry  Findings: No rash  Neurological:      General: No focal deficit present  Mental Status: She is alert and oriented to person, place, and time  GCS: GCS eye subscore is 4  GCS verbal subscore is 5  GCS motor subscore is 6  Sensory: No sensory deficit  Psychiatric:         Attention and Perception: Attention normal          Mood and Affect: Mood is anxious and depressed  Speech: Speech is rapid and pressured  Behavior: Behavior normal  Behavior is cooperative  Thought Content: Thought content includes suicidal ideation  Thought content includes suicidal plan           Cognition and Memory: Cognition normal          Judgment: Judgment normal          Vital Signs  ED Triage Vitals [06/06/21 1645]   Temperature Pulse Respirations Blood Pressure SpO2   98 3 °F (36 8 °C) 100 20 142/70 99 %      Temp Source Heart Rate Source Patient Position - Orthostatic VS BP Location FiO2 (%)   Tympanic Monitor Lying Right arm --      Pain Score       --           Vitals:    06/06/21 1645 06/06/21 2120   BP: 142/70 119/60   Pulse: 100 83   Patient Position - Orthostatic VS: Lying Lying         Visual Acuity  Visual Acuity      Most Recent Value   L Pupil Size (mm)  3   R Pupil Size (mm)  3          ED Medications  Medications   acetaminophen (TYLENOL) tablet 650 mg (has no administration in time range)   aspirin (ECOTRIN LOW STRENGTH) EC tablet 81 mg (has no administration in time range)   atorvastatin (LIPITOR) tablet 40 mg (has no administration in time range)   FLUoxetine (PROzac) capsule 40 mg (has no administration in time range)   lisinopril (ZESTRIL) tablet 20 mg (has no administration in time range)   melatonin tablet 3 mg (3 mg Oral Given 6/6/21 2228)   pantoprazole (PROTONIX) EC tablet 20 mg (has no administration in time range)   valproic acid (DEPAKENE) capsule 500 mg (500 mg Oral Given 6/6/21 2229)   OLANZapine (ZyPREXA) tablet 15 mg (15 mg Oral Given 6/6/21 2228)   LORazepam (ATIVAN) tablet 1 mg (1 mg Oral Given 6/6/21 1703)       Diagnostic Studies  Results Reviewed     Procedure Component Value Units Date/Time    UA w Reflex to Microscopic w Reflex to Culture [[id number]] Collected: 06/06/21 1707    Lab Status: Final result Specimen: Urine, Clean Catch Updated: 06/06/21 1814     Color, UA Yellow     Clarity, UA Clear     Specific [place], UA 1 020     pH, UA 6 0     Leukocytes, UA Negative     Nitrite, UA Negative     Protein, UA Negative mg/dl      Glucose, UA Negative mg/dl      Ketones, UA Negative mg/dl      Urobilinogen, UA 1 0 E U /dl      Bilirubin, UA Negative     Blood, UA Negative    Novel Coronavirus (Covid-19),PCR [facility] - 2 hour stat [[id number]]  (Normal) Collected: 06/06/21 1702    Lab Status: Final result Specimen: Nares from Nasopharyngeal Swab Updated: 06/06/21 1804     SARS-CoV-2 Negative    Narrative: The specimen collection materials, transport medium, and/or testing methodology utilized in the production of these test results have been proven to be reliable in a limited validation with an abbreviated program under the Emergency Utilization Authorization provided by the FDA  Testing reported as "Presumptive positive" will be confirmed with secondary testing to ensure result accuracy    Clinical caution and judgement should be used with the interpretation of these results with consideration of the clinical impression and other laboratory testing  Testing reported as "Positive" or "Negative" has been proven to be accurate according to standard laboratory validation requirements  All testing is performed with control materials showing appropriate reactivity at standard intervals  Valproic acid level, total [[id number]]  (Abnormal) Collected: 06/06/21 1702    Lab Status: Final result Specimen: Blood from Arm, Right Updated: 06/06/21 1737     Valproic Acid, Total 49 ug/mL     Basic metabolic panel [[id number]] Collected: 06/06/21 1702    Lab Status: Final result Specimen: Blood from Arm, Right Updated: 06/06/21 1737     Sodium 144 mmol/L      Potassium 3 9 mmol/L      Chloride 105 mmol/L      CO2 29 mmol/L      ANION GAP 10 mmol/L      BUN 11 mg/dL      Creatinine 0 61 mg/dL      Glucose 106 mg/dL      Calcium 8 6 mg/dL      eGFR 106 ml/min/1 73sq m     Narrative:      [place] guidelines for Chronic Kidney Disease (CKD):     Stage 1 with normal or high GFR (GFR > 90 mL/min/1 73 square meters)    Stage 2 Mild CKD (GFR = 60-89 mL/min/1 73 square meters)    Stage 3A Moderate CKD (GFR = 45-59 mL/min/1 73 square meters)    Stage 3B Moderate CKD (GFR = 30-44 mL/min/1 73 square meters)    Stage 4 Severe CKD (GFR = 15-29 mL/min/1 73 square meters)    Stage 5 End Stage CKD (GFR <15 mL/min/1 73 square meters)  Note: GFR calculation is accurate only with a steady state creatinine    TSH, 3rd generation with Free T4 reflex [[id number]]  (Normal) Collected: 06/06/21 1702    Lab Status: Final result Specimen: Blood from Arm, Right Updated: 06/06/21 1737     TSH 3RD GENERATON 1 965 uIU/mL     Narrative:      Patients undergoing fluorescein dye angiography may retain small amounts of fluorescein in the body for 48-72 hours post procedure   Samples containing fluorescein can produce falsely depressed TSH values  If the patient had this procedure,a specimen should be resubmitted post fluorescein clearance  Rapid drug screen, urine [[id number]]  (Normal) Collected: 06/06/21 1707    Lab Status: Final result Specimen: Urine, Clean Catch Updated: 06/06/21 1722     Amph/Meth UR Negative     Barbiturate Ur Negative     Benzodiazepine Urine Negative     Cocaine Urine Negative     Methadone Urine Negative     Opiate Urine Negative     PCP Ur Negative     THC Urine Negative     Oxycodone Urine Negative    Narrative:      FOR MEDICAL PURPOSES ONLY  IF CONFIRMATION NEEDED PLEASE CONTACT THE LAB WITHIN 5 DAYS      Drug Screen Cutoff Levels:  AMPHETAMINE/METHAMPHETAMINES  1000 ng/mL  BARBITURATES     200 ng/mL  BENZODIAZEPINES     200 ng/mL  COCAINE      300 ng/mL  METHADONE      300 ng/mL  OPIATES      300 ng/mL  PHENCYCLIDINE     25 ng/mL  THC       50 ng/mL  OXYCODONE      100 ng/mL    POCT alcohol breath test [[id number]]  (Normal) Resulted: 06/06/21 1720    Lab Status: Final result Updated: 06/06/21 1720     EXTBreath Alcohol 0 000    CBC and differential [[id number]]  (Abnormal) Collected: 06/06/21 1702    Lab Status: Final result Specimen: Blood from Arm, Right Updated: 06/06/21 1712     WBC 9 33 Thousand/uL      RBC 3 35 Million/uL      Hemoglobin 11 0 g/dL      Hematocrit 33 3 %      MCV 99 fL      MCH 32 8 pg      MCHC 33 0 g/dL      RDW 15 5 %      MPV 9 5 fL      Platelets 311 Thousands/uL      nRBC 0 /100 WBCs      Neutrophils Relative 60 %      Immat GRANS % 2 %      Lymphocytes Relative 24 %      Monocytes Relative 11 %      Eosinophils Relative 2 %      Basophils Relative 1 %      Neutrophils Absolute 5 68 Thousands/µL      Immature Grans Absolute 0 19 Thousand/uL      Lymphocytes Absolute 2 22 Thousands/µL      Monocytes Absolute 1 02 Thousand/µL      Eosinophils Absolute 0 16 Thousand/µL      Basophils Absolute 0 06 Thousands/µL                  No orders to display              Procedures  ECG 12 Lead Documentation Only    Date/Time: 6/6/2021 7:54 PM  Performed by: Alessandra Belcher DO  Authorized by: Alessandra Belcher DO     Indications / Diagnosis:  Elder psych patient  ECG reviewed by me, the ED Provider: yes    Patient location:  ED  Previous ECG:     Previous ECG:  Compared to current    Comparison ECG info:  2-21    Similarity:  No change  Interpretation:     Interpretation: normal    Rate:     ECG rate:  90    ECG rate assessment: normal    Rhythm:     Rhythm: sinus rhythm    Ectopy:     Ectopy: none    QRS:     QRS axis:  Normal    QRS intervals:  Normal  Conduction:     Conduction: normal    ST segments:     ST segments:  Normal  T waves:     T waves: normal               ED Course  ED Course as of Jun 06 2301   Sun Jun 06, 2021   1753 Patient medically cleared for inpatient psychiatric care  1754 Reviewed anemia with patient - she denies any current blood in BM or stool, no vaginal bleeding  Anemia possibly from Depakote  2219 Signed out to [employer] suicidal with plan, 201 faxed to Patient Communicator  SBIRT 20yo+      Most Recent Value   SBIRT (24 yo +)   In order to provide better care to our patients, we are screening all of our patients for alcohol and drug use  Would it be okay to ask you these screening questions?   No Filed at: 06/06/2021 1734                    MDM  Number of Diagnoses or Management Options  Suicidal ideation: new and requires workup     Amount and/or Complexity of Data Reviewed  Clinical lab tests: ordered and reviewed  Obtain history from someone other than the patient: yes  Discuss the patient with other providers: yes    Patient Progress  Patient progress: improved      Disposition  Final diagnoses:   Suicidal ideation - acute with plan     Time reflects when diagnosis was documented in both MDM as applicable and the Disposition within this note     Time User Action Codes Description Comment    6/6/2021  5:54 PM Crista Graf Add [L25 061] Suicidal ideation     6/6/2021 11:01 PM aJckelyn Goode Modify [T47 093] Suicidal ideation acute with plan      ED Disposition     ED Disposition Condition Date/Time Comment    Transfer to [facility] Jun 6, 2021  5:54 PM Magdalena Goncalves should be transferred out to  and has been medically cleared  MD Documentation      Most Recent Value   Sending MD Dr Madison Sims    None         Patient's Medications   Discharge Prescriptions    No medications on file     No discharge procedures on file      PDMP Review     None          ED Provider  Electronically Signed by           Sheridan Dickens DO  06/06/21 4149

## 2021-06-07 VITALS
SYSTOLIC BLOOD PRESSURE: 128 MMHG | BODY MASS INDEX: 47.89 KG/M2 | WEIGHT: 279 LBS | DIASTOLIC BLOOD PRESSURE: 92 MMHG | OXYGEN SATURATION: 97 % | RESPIRATION RATE: 18 BRPM | TEMPERATURE: 97.1 F | HEART RATE: 86 BPM

## 2021-06-07 LAB
ATRIAL RATE: 90 BPM
P AXIS: 59 DEGREES
PR INTERVAL: 210 MS
QRS AXIS: 68 DEGREES
QRSD INTERVAL: 82 MS
QT INTERVAL: 364 MS
QTC INTERVAL: 445 MS
T WAVE AXIS: 65 DEGREES
VENTRICULAR RATE: 90 BPM

## 2021-06-07 PROCEDURE — 93010 ELECTROCARDIOGRAM REPORT: CPT | Performed by: INTERNAL MEDICINE

## 2021-06-07 RX ADMIN — ASPIRIN 81 MG: 81 TABLET, COATED ORAL at 08:50

## 2021-06-07 RX ADMIN — PANTOPRAZOLE SODIUM 20 MG: 20 TABLET, DELAYED RELEASE ORAL at 08:50

## 2021-06-07 RX ADMIN — VALPROIC ACID 500 MG: 250 CAPSULE, LIQUID FILLED ORAL at 08:50

## 2021-06-07 RX ADMIN — OLANZAPINE 15 MG: 10 TABLET, FILM COATED ORAL at 21:38

## 2021-06-07 RX ADMIN — VALPROIC ACID 500 MG: 250 CAPSULE, LIQUID FILLED ORAL at 21:38

## 2021-06-07 RX ADMIN — ATORVASTATIN CALCIUM 40 MG: 40 TABLET, FILM COATED ORAL at 18:55

## 2021-06-07 RX ADMIN — FLUOXETINE 40 MG: 10 CAPSULE ORAL at 08:50

## 2021-06-07 RX ADMIN — LISINOPRIL 20 MG: 5 TABLET ORAL at 08:50

## 2021-06-07 NOTE — ED NOTES
Patient was denied by Janette Valdovinos last night per Henny during a conversation today  No beds available at [facility] per [facility]ong [facility] has one potential bed, and per Ga Degroot, they are in network with [address] [place] [address], which is the patient's secondary insurance  She has used all of her Medicare days, so precert will be needed from secondary

## 2021-06-07 NOTE — ED NOTES
[facility] might have an available bed, but they feel patient's last blood pressure reading was too high  They will only consider her referral if her systolic pressure is lower  She said they would likely consider if it was in the 120-130 range

## 2021-06-07 NOTE — ED NOTES
EVS (Eligibility Verification System) called - [phone]    Automated system indicates: Medicare A and B, [facility] Recipient ID [id number]

## 2021-06-07 NOTE — ED NOTES
Per [facility], Patient is out of medicare days and they are not in network with [facility] - patient will need a precert with [facility]

## 2021-06-07 NOTE — ED NOTES
Patient resting comfortably after nightime med pass  Patient has been sleeping the duration of the night without incident        Jessie Abad RN  06/07/21 4023

## 2021-06-07 NOTE — ED NOTES
Crisis worker spoke with Mia, who had some questions  Crisis worker answered some of the questions, but Mia asked to speak with a nurse or someone who can answer  Mia primarily needs to know when the patients last seizure was  Someone from day shift should talk to the staff at the group home for this information  They can then contact Tabby Nicely ([phone])  And provide them with this information

## 2021-06-07 NOTE — ED NOTES
Bed Search    [facility] Per [facility], no beds   [facility] - Per [place], no beds  [facility]- Per [place], no beds  [facility], no beds   [facility], can review for possible discharge, chart faxed at this time  [address] Per [facility], no beds  [facility] Per [facility], no beds  [facility]- Per [place], no beds  [facility]- Per [place], no beds   [facility]- Per [facility], no beds         Crisis to follow up with Jose and continue bed search next shift

## 2021-06-08 NOTE — ED NOTES
Received word from Maxwell Henson at [facility] that [address] will transport patient to [place] with 2130 pickup  Called Sammy and relayed that information to EDD in admissions department

## 2021-06-08 NOTE — ED NOTES
Patient is accepted at [facility] by Dr Mary Jane Lee in admissions there says no authorizations are needed and they will be in contact and provide [facility] with whatever information is needed  She instructed to set up transport and then let them know eta

## 2021-06-08 NOTE — EMTALA/ACUTE CARE TRANSFER
[facility] EMERGENCY DEPARTMENT  [address]  [facility] [address]  Dept: [phone]      EMTALA TRANSFER CONSENT    NAME Leydi Esparza                                         1970                              MRN [id number]    I have been informed of my rights regarding examination, treatment, and transfer   by Dr Gloria Weinstein DO    Benefits: Specialized equipment and/or services available at the receiving facility (Include comment)________________________    Risks: Potential for delay in receiving treatment      Consent for Transfer:  I acknowledge that my medical condition has been evaluated and explained to me by the emergency department physician or other qualified medical person and/or my attending physician, who has recommended that I be transferred to the service of  Accepting Physician: Dr Pito Unger at [address] Name, [address] : Dameon Solis  The above potential benefits of such transfer, the potential risks associated with such transfer, and the probable risks of not being transferred have been explained to me, and I fully understand them  The doctor has explained that, in my case, the benefits of transfer outweigh the risks  I agree to be transferred  I authorize the performance of emergency medical procedures and treatments upon me in both transit and upon arrival at the receiving facility  Additionally, I authorize the release of any and all medical records to the receiving facility and request they be transported with me, if possible  I understand that the safest mode of transportation during a medical emergency is an ambulance and that the Hospital advocates the use of this mode of transport  Risks of traveling to the receiving facility by car, including absence of medical control, life sustaining equipment, such as oxygen, and medical personnel has been explained to me and I fully understand them      ([facility])  [  ]  I consent to the stated transfer and to be transported by ambulance/helicopter  [  ]  I consent to the stated transfer, but refuse transportation by ambulance and accept full responsibility for my transportation by car  I understand the risks of non-ambulance transfers and I exonerate the Hospital and its staff from any deterioration in my condition that results from this refusal     X___________________________________________    DATE  21  TIME________  Signature of patient or legally responsible individual signing on patient behalf           RELATIONSHIP TO PATIENT_________________________          Provider Certification    NAME Leydi Page                                         1970                              MRN [id number]    A medical screening exam was performed on the above named patient  Based on the examination:    Condition Necessitating Transfer The encounter diagnosis was Suicidal ideation  Patient Condition: The patient has been stabilized such that within reasonable medical probability, no material deterioration of the patient condition or the condition of the unborn child(courtney) is likely to result from the transfer    Reason for Transfer: Level of Care needed not available at this facility    Transfer Requirements: [facility],[address]   · [facility] available and qualified personnel available for treatment as acknowledged by Herman Carson [phone]  · Agreed to accept transfer and to provide appropriate medical treatment as acknowledged by       Dr Aggie Gosselin  · Appropriate medical records of the examination and treatment of the patient are provided at the time of transfer   [address], [address] _______  · Transfer will be performed by qualified personnel from [address] [place] [address]  and appropriate transfer equipment as required, including the use of necessary and appropriate life support measures      Provider Certification: I have examined the patient and explained the following risks and benefits of being transferred/refusing transfer to the patient/family:  General risk, such as traffic hazards, adverse weather conditions, rough terrain or turbulence, possible failure of equipment (including vehicle or aircraft), or consequences of actions of persons outside the control of the transport personnel      Based on these reasonable risks and benefits to the patient and/or the unborn child(courtney), and based upon the information available at the time of the patients examination, I certify that the medical benefits reasonably to be expected from the provision of appropriate medical treatments at another medical facility outweigh the increasing risks, if any, to the individuals medical condition, and in the case of labor to the unborn child, from effecting the transfer      X____________________________________________ DATE 06/07/21        TIME_______      ORIGINAL - SEND TO MEDICAL RECORDS   COPY - SEND WITH PATIENT DURING TRANSFER

## 2021-07-23 ENCOUNTER — HOSPITAL ENCOUNTER (EMERGENCY)
Facility: HOSPITAL | Age: 51
Discharge: NURSING, REHAB OR LONG-TERM CARE | End: 2021-07-25
Attending: EMERGENCY MEDICINE
Payer: MEDICARE

## 2021-07-23 DIAGNOSIS — R45.851 SUICIDAL IDEATION: Primary | ICD-10-CM

## 2021-07-23 LAB
AMPHETAMINES SERPL QL SCN: NEGATIVE
BARBITURATES UR QL: NEGATIVE
BENZODIAZ UR QL: NEGATIVE
COCAINE UR QL: NEGATIVE
ETHANOL EXG-MCNC: 0 MG/DL
EXT PREG TEST URINE: NEGATIVE
EXT. CONTROL ED NAV: NORMAL
METHADONE UR QL: NEGATIVE
OPIATES UR QL SCN: NEGATIVE
OXYCODONE+OXYMORPHONE UR QL SCN: NEGATIVE
PCP UR QL: NEGATIVE
SARS-COV-2 RNA RESP QL NAA+PROBE: NEGATIVE
THC UR QL: NEGATIVE

## 2021-07-23 PROCEDURE — U0003 INFECTIOUS AGENT DETECTION BY NUCLEIC ACID (DNA OR RNA); SEVERE ACUTE RESPIRATORY SYNDROME CORONAVIRUS 2 (SARS-COV-2) (CORONAVIRUS DISEASE [COVID-19]), AMPLIFIED PROBE TECHNIQUE, MAKING USE OF HIGH THROUGHPUT TECHNOLOGIES AS DESCRIBED BY CMS-2020-01-R: HCPCS | Performed by: EMERGENCY MEDICINE

## 2021-07-23 PROCEDURE — U0005 INFEC AGEN DETEC AMPLI PROBE: HCPCS | Performed by: EMERGENCY MEDICINE

## 2021-07-23 PROCEDURE — 99285 EMERGENCY DEPT VISIT HI MDM: CPT | Performed by: EMERGENCY MEDICINE

## 2021-07-23 PROCEDURE — 80307 DRUG TEST PRSMV CHEM ANLYZR: CPT | Performed by: EMERGENCY MEDICINE

## 2021-07-23 PROCEDURE — 82075 ASSAY OF BREATH ETHANOL: CPT | Performed by: EMERGENCY MEDICINE

## 2021-07-23 PROCEDURE — 99285 EMERGENCY DEPT VISIT HI MDM: CPT

## 2021-07-23 PROCEDURE — 81025 URINE PREGNANCY TEST: CPT | Performed by: EMERGENCY MEDICINE

## 2021-07-23 NOTE — ED PROVIDER NOTES
History  Chief Complaint   Patient presents with    Suicidal     pt comes from [facility], pt states that " I miss my boyfriend, no one cares about me, life is going down the toilet I want to kill myself" via  traffic or drink bleach  pt cut her left arm with a plastic fork earlier today  60-year-old female with history of bipolar disorder and borderline personality disorder presents to the emergency department for psychiatric evaluation  The patient is coming from her group home and states that she is having thoughts of suicide  States that she has a plan to  traffic or drink bleach  The patient reports that she is upset because she has not seen her boyfriend and that no one cares about her  The patient denies HI and AVH  She reports that she has no access to a firearm  The patient states that she did try to harm herself with a plastic knife earlier in the day  She has no medical complaints at this time  The patient states that she wants to sign a 201 for inpatient behavioral health treatment  Prior to Admission Medications   Prescriptions Last Dose Informant Patient Reported? Taking?    FLUoxetine (PROzac) 20 mg capsule   No No   Sig: Take 3 capsules (60 mg total) by mouth daily At 9am   Patient taking differently: Take 40 mg by mouth daily At 9am   OLANZapine (ZyPREXA) 20 MG tablet   Yes No   Sig: Take 20 mg by mouth daily at bedtime   acetaminophen (TYLENOL) 325 mg tablet   No No   Sig: Take 2 tablets (650 mg total) by mouth every 6 (six) hours as needed for mild pain   aspirin (ECOTRIN LOW STRENGTH) 81 mg EC tablet   No No   Sig: Take 1 tablet (81 mg total) by mouth daily At 9am   atorvastatin (LIPITOR) 40 mg tablet   Yes No   Sig: Take 40 mg by mouth daily   cloZAPine (CLOZARIL) 25 mg tablet   Yes No   Sig: Take 12 5 mg by mouth daily at bedtime   haloperidol (HALDOL) 5 mg tablet   Yes No   Sig: Take 5 mg by mouth 2 (two) times a day   lisinopril (ZESTRIL) 20 mg tablet   Yes No   Sig: Take 20 mg by mouth   melatonin 3 mg   Yes No   Sig: Take 3 mg by mouth daily at bedtime   pantoprazole (PROTONIX) 20 mg tablet   Yes No   Sig: Take 20 mg by mouth   prazosin (MINIPRESS) 1 mg capsule   No No   Sig: Take 1 capsule (1 mg total) by mouth daily at bedtime Take one capsule by mouth at bedtime for dreams / sleep disturbances  valproic acid (DEPAKENE) 250 mg capsule   Yes No   Sig: Take 500 mg by mouth 4 (four) times a day      Facility-Administered Medications: None       Past Medical History:   Diagnosis Date    Bipolar 1 disorder ([place] [address] )     Borderline personality disorder ([place] [address] )     CAD (coronary artery disease)     Dyslipidemia     GERD (gastroesophageal reflux disease)     Hypertension     Obesity     Psychiatric disorder     PTSD (post-traumatic stress disorder)     Schizoaffective disorder ([place] [address] )     Seizure ([place] [address] )        Past Surgical History:   Procedure Laterality Date    APPENDECTOMY      CHOLECYSTECTOMY      FOOT SURGERY Right        History reviewed  No pertinent family history  I have reviewed and agree with the history as documented  E-Cigarette/Vaping    E-Cigarette Use Never User      E-Cigarette/Vaping Substances    Nicotine No     THC No     CBD No     Flavoring No     Other No     Unknown No      Social History     Tobacco Use    Smoking status: Former Smoker    Smokeless tobacco: Never Used   Vaping Use    Vaping Use: Never used   Substance Use Topics    Alcohol use: Not Currently    Drug use: Not Currently        Review of Systems   Constitutional: Negative for chills and fever  HENT: Negative for ear pain and sore throat  Eyes: Negative for pain and visual disturbance  Respiratory: Negative for cough and shortness of breath  Cardiovascular: Negative for chest pain and palpitations  Gastrointestinal: Negative for abdominal pain and vomiting  Genitourinary: Negative for dysuria and hematuria     Musculoskeletal: Negative for arthralgias and back pain  Skin: Negative for color change and rash  Neurological: Negative for seizures and syncope  Psychiatric/Behavioral: Positive for dysphoric mood, self-injury and suicidal ideas  Negative for hallucinations  All other systems reviewed and are negative  Physical Exam  ED Triage Vitals   Temperature Pulse Respirations Blood Pressure SpO2   07/23/21 1854 07/23/21 1847 07/23/21 1847 07/23/21 1847 07/23/21 1847   98 7 °F (37 1 °C) 94 18 146/66 97 %      Temp Source Heart Rate Source Patient Position - Orthostatic VS BP Location FiO2 (%)   07/23/21 1854 07/24/21 0618 07/24/21 0618 07/24/21 0618 --   Oral Monitor Lying Right arm       Pain Score       07/23/21 1847       No Pain             Orthostatic Vital Signs  Vitals:    07/23/21 1847 07/24/21 0618 07/24/21 1955 07/25/21 1055   BP: 146/66 131/85 150/93 118/76   Pulse: 94 73 73 89   Patient Position - Orthostatic VS:  Lying Sitting Lying       Physical Exam  Vitals and nursing note reviewed  Constitutional:       General: She is not in acute distress  Appearance: She is well-developed  HENT:      Head: Normocephalic and atraumatic  Eyes:      Conjunctiva/sclera: Conjunctivae normal    Cardiovascular:      Rate and Rhythm: Normal rate and regular rhythm  Heart sounds: No murmur heard  Pulmonary:      Effort: Pulmonary effort is normal  No respiratory distress  Breath sounds: Normal breath sounds  Abdominal:      Palpations: Abdomen is soft  Tenderness: There is no abdominal tenderness  Musculoskeletal:      Cervical back: Neck supple  Skin:     General: Skin is warm and dry  Neurological:      Mental Status: She is alert  Psychiatric:         Attention and Perception: She does not perceive auditory or visual hallucinations  Thought Content: Thought content includes suicidal ideation  Thought content does not include homicidal ideation  Thought content includes suicidal plan   Thought content does not include homicidal plan  Judgment: Judgment is inappropriate  ED Medications  Medications   OLANZapine (ZyPREXA) tablet 20 mg (20 mg Oral Given 7/25/21 0044)       Diagnostic Studies  Results Reviewed     Procedure Component Value Units Date/Time    CBC and differential [[id number]] Collected: 07/24/21 0645    Lab Status: Final result Specimen: Blood from Arm, Right Updated: 07/24/21 0743     WBC 8 99 Thousand/uL      RBC 3 85 Million/uL      Hemoglobin 12 4 g/dL      Hematocrit 37 9 %      MCV 98 fL      MCH 32 2 pg      MCHC 32 7 g/dL      RDW 14 8 %      MPV 9 1 fL      Platelets 195 Thousands/uL      nRBC 0 /100 WBCs     Narrative: This is an appended report  These results have been appended to a previously verified report      Manual Differential(PHLEBS Do Not Order) [[id number]]  (Abnormal) Collected: 07/24/21 0645    Lab Status: Final result Specimen: Blood from Arm, Right Updated: 07/24/21 0743     Segmented % 65 %      Bands % 2 %      Lymphocytes % 14 %      Monocytes % 7 %      Eosinophils, % 3 %      Basophils % 0 %      Myelocytes % 1 %      Atypical Lymphocytes % 8 %      Absolute Neutrophils 6 02 Thousand/uL      Lymphocytes Absolute 1 26 Thousand/uL      Monocytes Absolute 0 63 Thousand/uL      Eosinophils Absolute 0 27 Thousand/uL      Basophils Absolute 0 00 Thousand/uL      Total Counted --     Platelet Estimate Adequate    Narrative:      WBC has been corrected due to the presence of NRBC, please see adjusted WBC     Comprehensive metabolic panel [[id number]]  (Abnormal) Collected: 07/24/21 0645    Lab Status: Final result Specimen: Blood from Arm, Right Updated: 07/24/21 0709     Sodium 138 mmol/L      Potassium 3 9 mmol/L      Chloride 105 mmol/L      CO2 30 mmol/L      ANION GAP 3 mmol/L      BUN 10 mg/dL      Creatinine 0 54 mg/dL      Glucose 92 mg/dL      Calcium 9 0 mg/dL      Corrected Calcium 9 6 mg/dL      AST 10 U/L      ALT 22 U/L      Alkaline Phosphatase 68 U/L      Total Protein 7 6 g/dL      Albumin 3 2 g/dL      Total Bilirubin 0 47 mg/dL      eGFR 110 ml/min/1 73sq m     Narrative:      [place] guidelines for Chronic Kidney Disease (CKD):     Stage 1 with normal or high GFR (GFR > 90 mL/min/1 73 square meters)    Stage 2 Mild CKD (GFR = 60-89 mL/min/1 73 square meters)    Stage 3A Moderate CKD (GFR = 45-59 mL/min/1 73 square meters)    Stage 3B Moderate CKD (GFR = 30-44 mL/min/1 73 square meters)    Stage 4 Severe CKD (GFR = 15-29 mL/min/1 73 square meters)    Stage 5 End Stage CKD (GFR <15 mL/min/1 73 square meters)  Note: GFR calculation is accurate only with a steady state creatinine    Novel Coronavirus (Covid-19),PCR [facility] - 2 Hour Stat [[id number]]  (Normal) Collected: 07/23/21 1914    Lab Status: Final result Specimen: Nares from Nose Updated: 07/23/21 2103     SARS-CoV-2 Negative    Narrative:           Rapid drug screen, urine [[id number]]  (Normal) Collected: 07/23/21 1921    Lab Status: Final result Specimen: Urine, Clean Catch Updated: 07/23/21 2017     Amph/Meth UR Negative     Barbiturate Ur Negative     Benzodiazepine Urine Negative     Cocaine Urine Negative     Methadone Urine Negative     Opiate Urine Negative     PCP Ur Negative     THC Urine Negative     Oxycodone Urine Negative    Narrative:      FOR MEDICAL PURPOSES ONLY  IF CONFIRMATION NEEDED PLEASE CONTACT THE LAB WITHIN 5 DAYS      Drug Screen Cutoff Levels:  AMPHETAMINE/METHAMPHETAMINES  1000 ng/mL  BARBITURATES     200 ng/mL  BENZODIAZEPINES     200 ng/mL  COCAINE      300 ng/mL  METHADONE      300 ng/mL  OPIATES      300 ng/mL  PHENCYCLIDINE     25 ng/mL  THC       50 ng/mL  OXYCODONE      100 ng/mL    POCT alcohol breath test [[id number]]  (Normal) Resulted: 07/23/21 1930    Lab Status: Final result Updated: 07/23/21 1930     EXTBreath Alcohol 0    POCT pregnancy, urine [[id number]]  (Normal) Resulted: 07/23/21 1929    Lab Status: Final result Updated: 07/23/21 1930     EXT PREG TEST UR (Ref: Negative) negative     Control valid                 No orders to display         Procedures  Procedures      ED Course  ED Course as of Jul 26 1452   Fri Jul 23, 2021 2039 Patient is medically clear for crisis evaluation  2040 201 signed      Sat Jul 24, 2021 1844 Accepted at [facility] by Dr Alannah Rodgers  Transportation is scheduled for 1330 (07/25)                                SBIRT 22yo+      Most Recent Value   SBIRT (23 yo +)   In order to provide better care to our patients, we are screening all of our patients for alcohol and drug use  Would it be okay to ask you these screening questions? No Filed at: 07/23/2021 2234   Initial Alcohol Screen: US AUDIT-C    1  How often do you have a drink containing alcohol?  0 Filed at: 07/23/2021 2234   2  How many drinks containing alcohol do you have on a typical day you are drinking? 0 Filed at: 07/23/2021 2234   3a  Male UNDER 65: How often do you have five or more drinks on one occasion? 0 Filed at: 07/23/2021 2234   3b  FEMALE Any Age, or MALE 65+: How often do you have 4 or more drinks on one occassion? 0 Filed at: 07/23/2021 2234   Audit-C Score  0 Filed at: 07/23/2021 2234   DAT: How many times in the past year have you    Used an illegal drug or used a prescription medication for non-medical reasons? Never Filed at: 07/23/2021 2234                MDM  Number of Diagnoses or Management Options  Suicidal ideation  Diagnosis management comments: 42-year-old female presented to the emergency department for psychiatric evaluation  Patient expressing suicidal ideation and wanting to sign a 201 form for inpatient behavioral health treatment  The patient was medically cleared for crisis evaluation  Patient signed a 201 form and is currently pending placement  The patient was signed out to a different provider prior to final disposition        Disposition  Final diagnoses:   Suicidal ideation Time reflects when diagnosis was documented in both MDM as applicable and the Disposition within this note     Time User Action Codes Description Comment    7/23/2021  8:39 PM Ale Justin Add [S21 740] Suicidal ideation       ED Disposition     ED Disposition Condition Date/Time Comment    Transfer to [facility]  Fri Jul 23, 2021  8:39 PM Lynnette Muniz should be transferred out and has been medically cleared          MD Documentation      Most Recent Value   Patient Condition  The patient has been stabilized such that within reasonable medical probability, no material deterioration of the patient condition or the condition of the unborn child(courtney) is likely to result from the transfer   Reason for Transfer  Level of Care needed not available at this facility   Benefits of Transfer  Other benefits (Include comment)_______________________ Charlette Heimlich [address]]   Risks of Transfer  Potential for delay in receiving treatment   Accepting Physician  Dr Leno Del Valle Name, [facility]    (Name & Tel number)  Anjel Hinton, LSW   Transported by [employer] and Unit #)  CTS   Sending MD Dr Osullivan Rule   Provider Certification  General risk, such as traffic hazards, adverse weather conditions, rough terrain or turbulence, possible failure of equipment (including vehicle or aircraft), or consequences of actions of persons outside the control of the transport personnel      RN Documentation      [facility] [address] [facility] Name, [facility]   [facility] (Name & Tel number)  Anjel Hinton, LSW   Transported by [employer] and Unit #)  CTS      Follow-up Information    None         Discharge Medication List as of 7/25/2021  1:40 PM      CONTINUE these medications which have NOT CHANGED    Details   acetaminophen (TYLENOL) 325 mg tablet Take 2 tablets (650 mg total) by mouth every 6 (six) hours as needed for mild pain, Starting Fri 10/16/2020, Print      aspirin (ECOTRIN LOW STRENGTH) 81 mg EC tablet Take 1 tablet (81 mg total) by mouth daily At 9am, Starting Mon 1/18/2021, No Print      atorvastatin (LIPITOR) 40 mg tablet Take 40 mg by mouth daily, Historical Med      cloZAPine (CLOZARIL) 25 mg tablet Take 12 5 mg by mouth daily at bedtime, Historical Med      FLUoxetine (PROzac) 20 mg capsule Take 3 capsules (60 mg total) by mouth daily At 9am, Starting Mon 1/18/2021, Normal      haloperidol (HALDOL) 5 mg tablet Take 5 mg by mouth 2 (two) times a day, Historical Med      lisinopril (ZESTRIL) 20 mg tablet Take 20 mg by mouth, Starting Wed 12/16/2020, Historical Med      melatonin 3 mg Take 3 mg by mouth daily at bedtime, Historical Med      OLANZapine (ZyPREXA) 20 MG tablet Take 20 mg by mouth daily at bedtime, Historical Med      pantoprazole (PROTONIX) 20 mg tablet Take 20 mg by mouth, Starting Tue 12/15/2020, Historical Med      prazosin (MINIPRESS) 1 mg capsule Take 1 capsule (1 mg total) by mouth daily at bedtime Take one capsule by mouth at bedtime for dreams / sleep disturbances  , Starting Mon 1/18/2021, Normal      valproic acid (DEPAKENE) 250 mg capsule Take 500 mg by mouth 4 (four) times a day, Historical Med           No discharge procedures on file  PDMP Review     None           ED Provider  Attending physically available and evaluated Ady Kasper I managed the patient along with the ED Attending      Electronically Signed by         Julee Gayle MD  07/26/21 4150

## 2021-07-24 LAB
ALBUMIN SERPL BCP-MCNC: 3.2 G/DL (ref 3.5–5)
ALP SERPL-CCNC: 68 U/L (ref 46–116)
ALT SERPL W P-5'-P-CCNC: 22 U/L (ref 12–78)
ANION GAP SERPL CALCULATED.3IONS-SCNC: 3 MMOL/L (ref 4–13)
AST SERPL W P-5'-P-CCNC: 10 U/L (ref 5–45)
BASOPHILS # BLD MANUAL: 0 THOUSAND/UL (ref 0–0.1)
BASOPHILS NFR MAR MANUAL: 0 % (ref 0–1)
BILIRUB SERPL-MCNC: 0.47 MG/DL (ref 0.2–1)
BUN SERPL-MCNC: 10 MG/DL (ref 5–25)
CALCIUM ALBUM COR SERPL-MCNC: 9.6 MG/DL (ref 8.3–10.1)
CALCIUM SERPL-MCNC: 9 MG/DL (ref 8.3–10.1)
CHLORIDE SERPL-SCNC: 105 MMOL/L (ref 100–108)
CO2 SERPL-SCNC: 30 MMOL/L (ref 21–32)
CREAT SERPL-MCNC: 0.54 MG/DL (ref 0.6–1.3)
EOSINOPHIL # BLD MANUAL: 0.27 THOUSAND/UL (ref 0–0.4)
EOSINOPHIL NFR BLD MANUAL: 3 % (ref 0–6)
ERYTHROCYTE [DISTWIDTH] IN BLOOD BY AUTOMATED COUNT: 14.8 % (ref 11.6–15.1)
GFR SERPL CREATININE-BSD FRML MDRD: 110 ML/MIN/1.73SQ M
GLUCOSE SERPL-MCNC: 92 MG/DL (ref 65–140)
HCT VFR BLD AUTO: 37.9 % (ref 34.8–46.1)
HGB BLD-MCNC: 12.4 G/DL (ref 11.5–15.4)
LYMPHOCYTES # BLD AUTO: 1.26 THOUSAND/UL (ref 0.6–4.47)
LYMPHOCYTES # BLD AUTO: 14 % (ref 14–44)
MCH RBC QN AUTO: 32.2 PG (ref 26.8–34.3)
MCHC RBC AUTO-ENTMCNC: 32.7 G/DL (ref 31.4–37.4)
MCV RBC AUTO: 98 FL (ref 82–98)
MONOCYTES # BLD AUTO: 0.63 THOUSAND/UL (ref 0–1.22)
MONOCYTES NFR BLD: 7 % (ref 4–12)
MYELOCYTES NFR BLD MANUAL: 1 % (ref 0–1)
NEUTROPHILS # BLD MANUAL: 6.02 THOUSAND/UL (ref 1.85–7.62)
NEUTS BAND NFR BLD MANUAL: 2 % (ref 0–8)
NEUTS SEG NFR BLD AUTO: 65 % (ref 43–75)
NRBC BLD AUTO-RTO: 0 /100 WBCS
PLATELET # BLD AUTO: 160 THOUSANDS/UL (ref 149–390)
PLATELET BLD QL SMEAR: ADEQUATE
PMV BLD AUTO: 9.1 FL (ref 8.9–12.7)
POTASSIUM SERPL-SCNC: 3.9 MMOL/L (ref 3.5–5.3)
PROT SERPL-MCNC: 7.6 G/DL (ref 6.4–8.2)
RBC # BLD AUTO: 3.85 MILLION/UL (ref 3.81–5.12)
SODIUM SERPL-SCNC: 138 MMOL/L (ref 136–145)
VARIANT LYMPHS # BLD AUTO: 8 %
WBC # BLD AUTO: 8.99 THOUSAND/UL (ref 4.31–10.16)

## 2021-07-24 PROCEDURE — 80053 COMPREHEN METABOLIC PANEL: CPT | Performed by: EMERGENCY MEDICINE

## 2021-07-24 PROCEDURE — 85027 COMPLETE CBC AUTOMATED: CPT | Performed by: EMERGENCY MEDICINE

## 2021-07-24 PROCEDURE — 85007 BL SMEAR W/DIFF WBC COUNT: CPT | Performed by: EMERGENCY MEDICINE

## 2021-07-24 PROCEDURE — 36415 COLL VENOUS BLD VENIPUNCTURE: CPT | Performed by: EMERGENCY MEDICINE

## 2021-07-24 RX ORDER — CLOZAPINE 25 MG/1
12.5 TABLET ORAL
Status: DISCONTINUED | OUTPATIENT
Start: 2021-07-24 | End: 2021-07-25 | Stop reason: HOSPADM

## 2021-07-24 RX ORDER — HALOPERIDOL 5 MG
5 TABLET ORAL 2 TIMES DAILY
Status: DISCONTINUED | OUTPATIENT
Start: 2021-07-24 | End: 2021-07-25 | Stop reason: HOSPADM

## 2021-07-24 RX ORDER — FLUOXETINE HYDROCHLORIDE 20 MG/1
40 CAPSULE ORAL ONCE
Status: DISCONTINUED | OUTPATIENT
Start: 2021-07-24 | End: 2021-07-25 | Stop reason: HOSPADM

## 2021-07-24 RX ORDER — LANOLIN ALCOHOL/MO/W.PET/CERES
3 CREAM (GRAM) TOPICAL
Status: DISCONTINUED | OUTPATIENT
Start: 2021-07-24 | End: 2021-07-25 | Stop reason: HOSPADM

## 2021-07-24 RX ORDER — PRAZOSIN HYDROCHLORIDE 1 MG/1
1 CAPSULE ORAL
Status: DISCONTINUED | OUTPATIENT
Start: 2021-07-24 | End: 2021-07-25 | Stop reason: HOSPADM

## 2021-07-24 RX ORDER — VALPROIC ACID 250 MG/1
500 CAPSULE, LIQUID FILLED ORAL EVERY 6 HOURS SCHEDULED
Status: DISCONTINUED | OUTPATIENT
Start: 2021-07-25 | End: 2021-07-25 | Stop reason: HOSPADM

## 2021-07-24 RX ORDER — OLANZAPINE 10 MG/1
20 TABLET ORAL ONCE
Status: COMPLETED | OUTPATIENT
Start: 2021-07-24 | End: 2021-07-25

## 2021-07-24 RX ORDER — PANTOPRAZOLE SODIUM 20 MG/1
20 TABLET, DELAYED RELEASE ORAL
Status: DISCONTINUED | OUTPATIENT
Start: 2021-07-25 | End: 2021-07-25 | Stop reason: HOSPADM

## 2021-07-24 RX ORDER — LISINOPRIL 20 MG/1
20 TABLET ORAL DAILY
Status: DISCONTINUED | OUTPATIENT
Start: 2021-07-25 | End: 2021-07-25 | Stop reason: HOSPADM

## 2021-07-24 RX ORDER — ASPIRIN 81 MG/1
81 TABLET, CHEWABLE ORAL DAILY
Status: DISCONTINUED | OUTPATIENT
Start: 2021-07-25 | End: 2021-07-25 | Stop reason: HOSPADM

## 2021-07-24 RX ORDER — ACETAMINOPHEN 325 MG/1
650 TABLET ORAL EVERY 6 HOURS PRN
Status: DISCONTINUED | OUTPATIENT
Start: 2021-07-24 | End: 2021-07-25 | Stop reason: HOSPADM

## 2021-07-24 NOTE — ED NOTES
[address] [facility] reported they can still review even with [facility] being primary payor d/t pt being on a 201  Labs were not received  Faxed resulted labs to [facility]  Crisis to f/u

## 2021-07-24 NOTE — ED ATTENDING ATTESTATION
7/23/2021  I saw and evaluated the patient  I have discussed the patient with the resident physician and agree with the resident's findings, assessment and plan as documented in the resident physician's note, unless otherwise documented below  All available laboratory and imaging studies were reviewed by myself  I was present for key portions of any procedure(s) performed by the resident and I was immediately available to provide assistance  I agree with the current assessment done in the Emergency Department  I have conducted an independent evaluation of this patient  Chief Complaint   Patient presents with    Suicidal     pt comes from [facility], pt states that " I miss my boyfriend, no one cares about me, life is going down the toilet I want to kill myself" via  traffic or drink bleach  pt cut her left arm with a plastic fork earlier today  Raiza Fiore is a 49 y o  female with past medical history of schizoaffective disorder, PTSD, borderline personality disorder, bipolar 1 disorder, coronary artery disease, hypertension, presenting from her group home with worsening low moods and suicide thoughts  Patient reports that due to the distance between her and her boyfriend, patient's boyfriend has not been able to visit and she has been feeling very sad  She feels as though no one cares about her at her group home  Patient reports thoughts of killing herself by standing in the traffic or by drinking bleach  Patient denies drinking any bleach or taking medications in overdose today  She did use a spork to hurt herself by cutting her left medial forearm, denies any pain at present  Does not have access to lethal means  Has been taking medications as prescribed  No recent illnesses      Review of Systems  Constitutional: denies fevers, chills  Visual/Eyes: no changes in vision  HENT: no rhinorrhea, no sore throat  Cardiac: no chest pain  Respiratory: no shortness of breath, no cough  GI: no abdominal pain, nausea, vomiting, or diarrhea  Heme/Onc: no easy bruising  Neuro: no focal weakness or numbness, no headaches    Ten systems reviewed and negative unless otherwise noted in HPI and above    Past Medical History:   Diagnosis Date    Bipolar 1 disorder ([place] [address] )     Borderline personality disorder ([place] [address] )     CAD (coronary artery disease)     Dyslipidemia     GERD (gastroesophageal reflux disease)     Hypertension     Obesity     Psychiatric disorder     PTSD (post-traumatic stress disorder)     Schizoaffective disorder ([place] [address] )     Seizure ([place] [address] )         Physical Exam  Vitals:    07/23/21 1847 07/23/21 1854   BP: 146/66    TempSrc:  Oral   Pulse: 94    Resp: 18    Temp:  98 7 °F (37 1 °C)     SPO2 RA Rest      ED from 7/23/2021 in [address] [place] [address] Emergency Department   SpO2  97 %   SpO2 Activity  At Rest   O2 Device  None (Room air)   O2 Flow Rate  --        Constitutional:  Awake, alert, oriented  No acute distress  HEENT:  Normocephalic, atraumatic  Sclera anicteric, conjunctiva not injected  Moist oral mucosa  Cardiac:  Regular rate and rhythm, no murmurs, rubs, or gallops  2+ radial pulses  Respiratory:  Lungs are clear to auscultation bilaterally, no wheezes or rales  Abdomen:  Nondistended  Extremities:  No deformities, no edema  There is a superficial radial to left medial forearm measuring approximately 10 cm, nothing to repair  Integument:  No rashes over exposed areas, cap refill less than 2 seconds  Neurologic:  Awake, alert, and oriented x3  Moves all extremities spontaneously  Nonfocal exam   Psychiatric:  Somewhat flat affect, mood is low, good eye contact  Patient reports having low moods and reports thoughts of suicide by either standing in traffic or by drinking bleach  Had a self-harm attempt by cutting earlier today  Does not appear to attend to internal stimuli  No evidence of audiovisual hallucinations          Diagnostic Studies  Results Reviewed     Procedure Component Value Units Date/Time    Novel Coronavirus [facility] - 2 Hour Stat [[id number]]  (Normal) Collected: 07/23/21 1914    Lab Status: Final result Specimen: Nares from Nose Updated: 07/23/21 2103     SARS-CoV-2 Negative    Narrative:           Rapid drug screen, urine [[id number]]  (Normal) Collected: 07/23/21 1921    Lab Status: Final result Specimen: Urine, Clean Catch Updated: 07/23/21 2017     Amph/Meth UR Negative     Barbiturate Ur Negative     Benzodiazepine Urine Negative     Cocaine Urine Negative     Methadone Urine Negative     Opiate Urine Negative     PCP Ur Negative     THC Urine Negative     Oxycodone Urine Negative    Narrative:      FOR MEDICAL PURPOSES ONLY  IF CONFIRMATION NEEDED PLEASE CONTACT THE LAB WITHIN 5 DAYS  Drug Screen Cutoff Levels:  AMPHETAMINE/METHAMPHETAMINES  1000 ng/mL  BARBITURATES     200 ng/mL  BENZODIAZEPINES     200 ng/mL  COCAINE      300 ng/mL  METHADONE      300 ng/mL  OPIATES      300 ng/mL  PHENCYCLIDINE     25 ng/mL  THC       50 ng/mL  OXYCODONE      100 ng/mL    POCT alcohol breath test [[id number]]  (Normal) Resulted: 07/23/21 1930    Lab Status: Final result Updated: 07/23/21 1930     EXTBreath Alcohol 0    POCT pregnancy, urine [[id number]]  (Normal) Resulted: 07/23/21 1929    Lab Status: Final result Updated: 07/23/21 1930     EXT PREG TEST UR (Ref: Negative) negative     Control valid          No orders to display       Procedures  Procedures            ED Course  Medications - No data to display    52-year-old female presenting with suicidal ideation  Vital signs reviewed, afebrile, within normal limits  Patient denies chest pain, shortness of breath, abdominal pain, nausea, vomiting  Nothing to suggest underlying medical illness to exacerbate patient's worsening depression and thoughts of suicide    Patient denies self-harm except for 1 episode of cutting earlier today, nothing to do for the abrasion/laceration  Patient is up-to-date on Tdap  COVID-19 is negative  Urine drug screen is negative  Breathalyzer alcohol is 0  Patient is medically cleared for psychiatric evaluation  Patient would like to sign involuntarily, 201 voluntary commitment form signed  We unfortunately do not have patient's home medication list and patient does not recall her medications  We will attempt to obtain medication list from patient's group home      Clinical Impression  Final diagnoses:   Suicidal ideation

## 2021-07-24 NOTE — ED NOTES
Called [facility] and spoke with Tyesha Blackburn they are still reviewing and would like additional labs (CBC &CMP)      FAX LABS TO [facility] INTAKE @ [phone] WHEN THEY ARE DONE

## 2021-07-24 NOTE — ED NOTES
Insurance Authorization for admission:   Phone call placed to [facility]  Phone number: [phone]  Spoke to Nash  5 days approved  Level of care: Acute Inpt  (201)  Review on 07/29/2021  Authorization # [id number]  EVS (Eligibility Verification System) called - [phone]    Automated system indicates: Primary Medicare A&B, secondary [address]  07/24/21   5933

## 2021-07-24 NOTE — ED NOTES
Patient is accepted at [facility]  Patient is accepted by Dr Amina Perez per [place] in Admissions  Transportation is arranged with [facility]  Transportation is scheduled for 1330 (07/25)  Patient may go to the floor at anytime  Nurse report is not needed          RORY Gamboa  07/24/21   8382

## 2021-07-24 NOTE — ED NOTES
Bed Search    [facility]- Per Mary, can review, chart to be faxed  [facility]  [facility]- Per Chelsy Rosas, no beds  [facility]- Per Mehran Moscoso, can review, chart to be faxed  [facility] [employer], can review, chart to be faxed

## 2021-07-24 NOTE — ED NOTES
[address] they are still reviewing      CW called [facility] and spoke with Jewel and she was declined as they do not have an appropriate bed at this time

## 2021-07-24 NOTE — ED NOTES
Per EVS, patient has primary Medicare A&B, and secondary with [address] (ID# [id number])       Angie Kowalski, RORY  07/23/21 2037

## 2021-07-24 NOTE — ED NOTES
Pt is a 49 y o  female who was brought to the ED due to suicidal ideations with a plan to run into traffic  Patient states she started to have these thoughts a few days ago, but didn't mention it to her staff members until tonight  Patient has been living at [facility] since January but doesn't like it there  She cannot identify any specific reason, but feels that nobody cares about her  Patient's group home has staff 24 hours and she has her own room; she denies any issues with the other residents  Patient states that "life in general" is her stressor because it sucks  Patient identifies feeling lonely since she lives so far away from her boyfriend who is in an all male group home  Patient states several times that she doesn't feel like anybody cares about her so there is no point in living  Patient has chronic feelings that she rather be dead than alive  Patient reports prior suicide attempts, most recently in April 2020 via overdose  Patient has received inpatient treatment multiple times in the past, most recently at [facility] in June 2021  Patient is active in outpatient with [facility]  She meets with her therapist every Wednesday and has a good relationship with her  Patient is unsure the last time she saw the psychiatrist or had a medication adjustment  Patient denies homicidal ideations and auditory/visual halluciantions  Patient does hear her own voice telling her that she's "no good, fat, and ugly"  Patient reports poor sleep due to constantly waking up throughout the night  Patient denies any other stressors or triggers  Patient does not feel safe returning home at this time and would like to sign in for inpatient treatment  Chief Complaint   Patient presents with    Suicidal     pt comes from [place]  [employer], pt states that " I miss my boyfriend, no one cares about me, life is going down the toilet I want to kill myself" via  traffic or drink bleach   pt cut her left arm with a plastic fork earlier today        Intake Assessment completed, Safety risk Assessment completed    JonnyRORY Garces  07/23/21 2054

## 2021-07-24 NOTE — ED NOTES
Per Brandy Flor at [facility], patient has exhausted therapeutic benefits, and director has stated patient can not be accommodated at their facility anymore, not willing to reconsider at this time

## 2021-07-24 NOTE — ED NOTES
Call from [facility]  Sanam, requesting an update and providing collateral information  She advised pt resides in their care, has Moderate IDD, Borderline Personality d/o, struggles with utilizing her coping skills when upset or angered, which often has resulted in her being hospitalized, per her history  Sanam is requesting to speak with pt by phone  Advised pt signed a 201 and bed search is in process with [facility] reviewing  Sanam would like to be notified of disposition        :Sanam: [phone]

## 2021-07-25 VITALS
DIASTOLIC BLOOD PRESSURE: 76 MMHG | WEIGHT: 279 LBS | BODY MASS INDEX: 47.89 KG/M2 | SYSTOLIC BLOOD PRESSURE: 118 MMHG | HEART RATE: 89 BPM | RESPIRATION RATE: 19 BRPM | OXYGEN SATURATION: 93 % | TEMPERATURE: 98.7 F

## 2021-07-25 RX ADMIN — PHENYTOIN 1 MG: 125 SUSPENSION ORAL at 00:44

## 2021-07-25 RX ADMIN — HALOPERIDOL 5 MG: 5 TABLET ORAL at 00:45

## 2021-07-25 RX ADMIN — ASPIRIN 81 MG 81 MG: 81 TABLET ORAL at 13:29

## 2021-07-25 RX ADMIN — VALPROIC ACID 500 MG: 250 CAPSULE, LIQUID FILLED ORAL at 00:44

## 2021-07-25 RX ADMIN — Medication 3 MG: at 00:45

## 2021-07-25 RX ADMIN — HALOPERIDOL 5 MG: 5 TABLET ORAL at 13:29

## 2021-07-25 RX ADMIN — LISINOPRIL 20 MG: 20 TABLET ORAL at 13:29

## 2021-07-25 RX ADMIN — OLANZAPINE 20 MG: 10 TABLET, FILM COATED ORAL at 00:44

## 2021-07-25 NOTE — ED CARE HANDOFF
Emergency Department Note      DOS: 07/24/21     The patient, King Ayala, presented on 7/23/2021 for worsening low moods and suicidal ideation  Patient has been calm and cooperative  We were unsuccessful in reaching patient's group home for her medication list     Workup Completed:  Medical clearance    ED Course / Workup Pending (followup): Medications   FLUoxetine (PROzac) capsule 40 mg (has no administration in time range)   OLANZapine (ZyPREXA) tablet 20 mg (has no administration in time range)   acetaminophen (TYLENOL) tablet 650 mg (has no administration in time range)   aspirin chewable tablet 81 mg (has no administration in time range)   cloZAPine (CLOZARIL) tablet 12 5 mg (has no administration in time range)   haloperidol (HALDOL) tablet 5 mg (has no administration in time range)   lisinopril (ZESTRIL) tablet 20 mg (has no administration in time range)   melatonin tablet 3 mg (has no administration in time range)   pantoprazole (PROTONIX) EC tablet 20 mg (has no administration in time range)   prazosin (MINIPRESS) capsule 1 mg (has no administration in time range)   valproic acid (DEPAKENE) capsule 500 mg (has no administration in time range)      Patient has been accepted for admission for at [facility] (Accepted by Dr Joycelyn Mccall), patient is to be transferred at 1330 on 7/25/2021  Patient's home medications ordered         Procedures  MDM  Number of Diagnoses or Management Options  Suicidal ideation: new and requires workup     Amount and/or Complexity of Data Reviewed  Clinical lab tests: ordered and reviewed    Risk of Complications, Morbidity, and/or Mortality  Presenting problems: high  Diagnostic procedures: high  Management options: high    Patient Progress  Patient progress: stable      Disposition  Final diagnoses:   Suicidal ideation     Time reflects when diagnosis was documented in both MDM as applicable and the Disposition within this note     Time User Action Codes Description Comment 7/23/2021  8:39 PM [employer] Add [L75 [id number]] Suicidal ideation       ED Disposition     ED Disposition Condition Date/Time Comment    Transfer to [facility]  Fri Jul 23, 2021  8:39 PM Leydi Owusu should be transferred out and has been medically cleared  MD Documentation      Most Recent Value   Patient Condition  The patient has been stabilized such that within reasonable medical probability, no material deterioration of the patient condition or the condition of the unborn child(courtney) is likely to result from the transfer   Reason for Transfer  Level of Care needed not available at this facility   Benefits of Transfer  Other benefits (Include comment)_______________________ [facility] tx]   Risks of Transfer  Potential for delay in receiving treatment   Accepting Physician  Dr Vikram Rod Name, [facility]    (Name & Tel number)  RORY Payne   Transported by [employer] and Unit #)  Homar Patel   Sending MD Dr Seda Snider   Provider Certification  General risk, such as traffic hazards, adverse weather conditions, rough terrain or turbulence, possible failure of equipment (including vehicle or aircraft), or consequences of actions of persons outside the control of the transport personnel      RN Documentation      [facility] Name, [facility]   [facility] (Name & Tel number)  RORY Payne   Transported by [employer] and Unit #)  CTS      Follow-up Information    None       Patient's Medications   Discharge Prescriptions    No medications on file     No discharge procedures on file         ED Provider  Electronically Signed by     Lu Matthews MD  07/24/21 0180

## 2021-07-25 NOTE — ED NOTES
Pt ambulated to bathroom and given breakfast tray w/ water Pt ate only the Vietnamese toast sticks and stated she did not want the rest of the breakfast  Pt calm, cooperative, and pleasant at this time       Ace Rodgers  07/25/21 [facility]  07/25/21 3472

## 2021-07-25 NOTE — ED NOTES
1:1 care assumed by charge nurse from 10:45-12:00 due to level A Trauma       Fco Arceo  07/25/21 9497

## 2021-07-25 NOTE — ED NOTES
Phone call placed to [facility], spoke with Arlyn Schirmer and let her know that patient will be transported to [facility] tomorrow and provided [facility] contact information

## 2021-07-25 NOTE — ED NOTES
[employer] called by this ED RN numerous times, left message in regards to obtaining patient's medications       Jelena Bryson, JOSÉ MIGUEL  07/25/21 2798

## 2021-07-25 NOTE — ED NOTES
Pt provided with a drink and a box full of snacks  Pt's bed sheet removed and pt provided with new blankets per pt's request at this time  Pt calm and cooperative       Greg Monsivais  07/24/21 [address] [facility] Loi  07/24/21 8295

## 2021-07-25 NOTE — EMTALA/ACUTE CARE TRANSFER
[facility] [address]  Dept: [phone]      GIWXPO TRANSFER CONSENT    NAME Leydi Rolon 1970                              MRN [id number]    I have been informed of my rights regarding examination, treatment, and transfer   by Dr Alan Anderson MD    Benefits: Other benefits (Include comment)_______________________ (Inpt MH tx)    Risks: Potential for delay in receiving treatment      Transfer Request   I acknowledge that my medical condition has been evaluated and explained to me by the emergency department physician or other qualified medical person and/or my attending physician who has recommended and offered to me further medical examination and treatment  I understand the Hospital's obligation with respect to the treatment and stabilization of my emergency medical condition  I nevertheless request to be transferred  I release the Hospital, the doctor, and any other persons caring for me from all responsibility or liability for any injury or ill effects that may result from my transfer and agree to accept all responsibility for the consequences of my choice to transfer, rather than receive stabilizing treatment at the Hospital  I understand that because the transfer is my request, my insurance may not provide reimbursement for the services  The Hospital will assist and direct me and my family in how to make arrangements for transfer, but the hospital is not liable for any fees charged by the transport service  In spite of this understanding, I refuse to consent to further medical examination and treatment which has been offered to me, and request transfer to  [facility]  Name, [address] : Constantin Carrier  I authorize the performance of emergency medical procedures and treatments upon me in both transit and upon arrival at the receiving facility    Additionally, I authorize the release of any and all medical records to the receiving facility and request they be transported with me, if possible  I authorize the performance of emergency medical procedures and treatments upon me in both transit and upon arrival at the receiving facility  Additionally, I authorize the release of any and all medical records to the receiving facility and request they be transported with me, if possible  I understand that the safest mode of transportation during a medical emergency is an ambulance and that the Hospital advocates the use of this mode of transport  Risks of traveling to the receiving facility by car, including absence of medical control, life sustaining equipment, such as oxygen, and medical personnel has been explained to me and I fully understand them  (PRAKASH CORRECT BOX BELOW)  [  ]  I consent to the stated transfer and to be transported by ambulance/helicopter  [  ]  I consent to the stated transfer, but refuse transportation by ambulance and accept full responsibility for my transportation by car  I understand the risks of non-ambulance transfers and I exonerate the Hospital and its staff from any deterioration in my condition that results from this refusal     X___________________________________________    DATE  21  TIME________  Signature of patient or legally responsible individual signing on patient behalf           RELATIONSHIP TO PATIENT_________________________          Provider Certification    NAME Leydi Sánchez                                         1970                              MRN [id number]    A medical screening exam was performed on the above named patient  Based on the examination:    Condition Necessitating Transfer The encounter diagnosis was Suicidal ideation      Patient Condition: The patient has been stabilized such that within reasonable medical probability, no material deterioration of the patient condition or the condition of the unborn child(courtney) is likely to result from the transfer    Reason for Transfer: Level of Care needed not available at this facility    Transfer Requirements: [facility]   · Space available and qualified personnel available for treatment as acknowledged by RORY Moon  · Agreed to accept transfer and to provide appropriate medical treatment as acknowledged by       Dr Neptali Heller  · Appropriate medical records of the examination and treatment of the patient are provided at the time of transfer   [facility],[address] _______  · Transfer will be performed by qualified personnel from [address] [place] [address]  and appropriate transfer equipment as required, including the use of necessary and appropriate life support measures  Provider Certification: I have examined the patient and explained the following risks and benefits of being transferred/refusing transfer to the patient/family:  General risk, such as traffic hazards, adverse weather conditions, rough terrain or turbulence, possible failure of equipment (including vehicle or aircraft), or consequences of actions of persons outside the control of the transport personnel      Based on these reasonable risks and benefits to the patient and/or the unborn child(courtney), and based upon the information available at the time of the patients examination, I certify that the medical benefits reasonably to be expected from the provision of appropriate medical treatments at another medical facility outweigh the increasing risks, if any, to the individuals medical condition, and in the case of labor to the unborn child, from effecting the transfer      X____________________________________________ DATE 07/25/21        TIME_______      ORIGINAL - SEND TO MEDICAL RECORDS   COPY - SEND WITH PATIENT DURING TRANSFER

## 2021-08-06 ENCOUNTER — HOSPITAL ENCOUNTER (EMERGENCY)
Facility: HOSPITAL | Age: 51
End: 2021-08-07
Attending: EMERGENCY MEDICINE | Admitting: EMERGENCY MEDICINE
Payer: MEDICARE

## 2021-08-06 DIAGNOSIS — R45.851 SUICIDAL IDEATIONS: ICD-10-CM

## 2021-08-06 DIAGNOSIS — Z00.8 ENCOUNTER FOR PSYCHOLOGICAL EVALUATION: Primary | ICD-10-CM

## 2021-08-06 LAB
AMPHETAMINES SERPL QL SCN: NEGATIVE
BARBITURATES UR QL: NEGATIVE
BENZODIAZ UR QL: NEGATIVE
COCAINE UR QL: NEGATIVE
ETHANOL EXG-MCNC: 0 MG/DL
METHADONE UR QL: NEGATIVE
OPIATES UR QL SCN: NEGATIVE
OXYCODONE+OXYMORPHONE UR QL SCN: NEGATIVE
PCP UR QL: NEGATIVE
SARS-COV-2 RNA RESP QL NAA+PROBE: NEGATIVE
THC UR QL: NEGATIVE

## 2021-08-06 PROCEDURE — 99284 EMERGENCY DEPT VISIT MOD MDM: CPT | Performed by: EMERGENCY MEDICINE

## 2021-08-06 PROCEDURE — 99285 EMERGENCY DEPT VISIT HI MDM: CPT

## 2021-08-06 PROCEDURE — U0003 INFECTIOUS AGENT DETECTION BY NUCLEIC ACID (DNA OR RNA); SEVERE ACUTE RESPIRATORY SYNDROME CORONAVIRUS 2 (SARS-COV-2) (CORONAVIRUS DISEASE [COVID-19]), AMPLIFIED PROBE TECHNIQUE, MAKING USE OF HIGH THROUGHPUT TECHNOLOGIES AS DESCRIBED BY CMS-2020-01-R: HCPCS | Performed by: EMERGENCY MEDICINE

## 2021-08-06 PROCEDURE — 82075 ASSAY OF BREATH ETHANOL: CPT | Performed by: EMERGENCY MEDICINE

## 2021-08-06 PROCEDURE — 80307 DRUG TEST PRSMV CHEM ANLYZR: CPT | Performed by: EMERGENCY MEDICINE

## 2021-08-06 RX ORDER — HYDROXYZINE HYDROCHLORIDE 25 MG/1
25 TABLET, FILM COATED ORAL ONCE
Status: COMPLETED | OUTPATIENT
Start: 2021-08-06 | End: 2021-08-06

## 2021-08-06 RX ADMIN — HYDROXYZINE HYDROCHLORIDE 25 MG: 25 TABLET, FILM COATED ORAL at 21:31

## 2021-08-06 NOTE — ED PROVIDER NOTES
History  Chief Complaint   Patient presents with    Psychiatric Evaluation     HPI    81-year-old female with extensive psychiatric history, presenting for evaluation of suicidal ideation  Patient presents from a group home, states that yesterday she developed suicidal ideation with a plan to jump in front of a car and slid wrist   Patient did not attempt anything  Patient reports multiple suicide attempts in the past with multiple psychiatric admissions  Patient denies HI/ AH/VH  Admits to sporadic alcohol and cigarette usage  Denies usage of any other recreational drugs  Admits to taking her psych meds   I spoke to her caregiver at group facility, her caregiver states that Leydi has a tendency to admit to SI when upset at the facility in order to leave  States that she does not believe that Leydi is truly suicidal  States that Leydi does not have access to knives outside of plastic silverware  Prior to Admission Medications   Prescriptions Last Dose Informant Patient Reported? Taking?    FLUoxetine (PROzac) 20 mg capsule   No Yes   Sig: Take 3 capsules (60 mg total) by mouth daily At 9am   Patient taking differently: Take 40 mg by mouth daily At 9am   OLANZapine (ZyPREXA) 20 MG tablet   Yes Yes   Sig: Take 20 mg by mouth daily at bedtime   acetaminophen (TYLENOL) 325 mg tablet   No Yes   Sig: Take 2 tablets (650 mg total) by mouth every 6 (six) hours as needed for mild pain   aspirin (ECOTRIN LOW STRENGTH) 81 mg EC tablet   No Yes   Sig: Take 1 tablet (81 mg total) by mouth daily At 9am   atorvastatin (LIPITOR) 40 mg tablet   Yes Yes   Sig: Take 40 mg by mouth daily   cloZAPine (CLOZARIL) 25 mg tablet   Yes Yes   Sig: Take 12 5 mg by mouth daily at bedtime   haloperidol (HALDOL) 5 mg tablet   Yes Yes   Sig: Take 5 mg by mouth 2 (two) times a day   lisinopril (ZESTRIL) 20 mg tablet   Yes Yes   Sig: Take 20 mg by mouth   melatonin 3 mg   Yes Yes   Sig: Take 3 mg by mouth daily at bedtime   pantoprazole (PROTONIX) 20 mg tablet   Yes Yes   Sig: Take 20 mg by mouth   prazosin (MINIPRESS) 1 mg capsule   No Yes   Sig: Take 1 capsule (1 mg total) by mouth daily at bedtime Take one capsule by mouth at bedtime for dreams / sleep disturbances  valproic acid (DEPAKENE) 250 mg capsule   Yes Yes   Sig: Take 500 mg by mouth 4 (four) times a day      Facility-Administered Medications: None       Past Medical History:   Diagnosis Date    Bipolar 1 disorder ([place] [address] )     Borderline personality disorder ([place] [address] )     CAD (coronary artery disease)     Dyslipidemia     GERD (gastroesophageal reflux disease)     Hypertension     Obesity     Psychiatric disorder     PTSD (post-traumatic stress disorder)     Schizoaffective disorder ([place] [address] )     Seizure ([place] [address] )        Past Surgical History:   Procedure Laterality Date    APPENDECTOMY      CHOLECYSTECTOMY      FOOT SURGERY Right        History reviewed  No pertinent family history  I have reviewed and agree with the history as documented  E-Cigarette/Vaping    E-Cigarette Use Never User      E-Cigarette/Vaping Substances    Nicotine No     THC No     CBD No     Flavoring No     Other No     Unknown No      Social History     Tobacco Use    Smoking status: Former Smoker    Smokeless tobacco: Never Used   Vaping Use    Vaping Use: Never used   Substance Use Topics    Alcohol use: Not Currently    Drug use: Not Currently        Review of Systems   Constitutional: Negative for chills and fever  Respiratory: Negative for cough and shortness of breath  Cardiovascular: Negative for chest pain and palpitations  Gastrointestinal: Negative for abdominal pain  Genitourinary: Negative for flank pain  Musculoskeletal: Negative for myalgias  Neurological: Negative for dizziness, light-headedness and headaches  Psychiatric/Behavioral: Positive for self-injury and suicidal ideas  All other systems reviewed and are negative        Physical Exam  ED Triage Vitals Temperature Pulse Respirations Blood Pressure SpO2   08/06/21 1813 08/06/21 1813 08/06/21 1813 08/06/21 1813 08/06/21 1813   98 9 °F (37 2 °C) 87 18 130/62 92 %      Temp Source Heart Rate Source Patient Position - Orthostatic VS BP Location FiO2 (%)   08/06/21 1813 08/06/21 2122 08/06/21 2122 08/06/21 1813 --   Tympanic Monitor Lying Left arm       Pain Score       --                    Orthostatic Vital Signs  Vitals:    08/06/21 1813 08/06/21 2122 08/07/21 0540   BP: 130/62 132/60 123/58   Pulse: 87 81 83   Patient Position - Orthostatic VS:  Lying        Physical Exam  Vitals and nursing note reviewed  Constitutional:       General: She is not in acute distress  Appearance: She is obese  She is not ill-appearing or toxic-appearing  HENT:      Head: Normocephalic and atraumatic  Right Ear: External ear normal       Left Ear: External ear normal       Nose: Nose normal       Mouth/Throat:      Mouth: Mucous membranes are moist       Pharynx: Oropharynx is clear  Eyes:      General: No scleral icterus  Extraocular Movements: Extraocular movements intact  Cardiovascular:      Rate and Rhythm: Normal rate and regular rhythm  Pulses: Normal pulses  Pulmonary:      Effort: Pulmonary effort is normal  No respiratory distress  Breath sounds: Normal breath sounds  Abdominal:      Palpations: Abdomen is soft  Tenderness: There is no abdominal tenderness  Musculoskeletal:         General: Normal range of motion  Cervical back: Normal range of motion and neck supple  Skin:     General: Skin is warm  Capillary Refill: Capillary refill takes less than 2 seconds  Neurological:      General: No focal deficit present  Mental Status: She is alert and oriented to person, place, and time  Psychiatric:         Attention and Perception: Attention and perception normal          Mood and Affect: Mood is depressed           Speech: Speech normal          Behavior: Behavior normal          ED Medications  Medications   hydrOXYzine HCL (ATARAX) tablet 25 mg (25 mg Oral Given 8/6/21 2131)   haloperidol (HALDOL) tablet 2 mg (2 mg Oral Given 8/7/21 1121)   diphenhydrAMINE (BENADRYL) tablet 25 mg (25 mg Oral Given 8/7/21 1122)       Diagnostic Studies  Results Reviewed     Procedure Component Value Units Date/Time    Novel Coronavirus (Covid-19),PCR [facility] [[id number]]  (Normal) Resulted: 08/06/21 1953    Lab Status: Final result Specimen: Nares from Nose Updated: 08/06/21 1953     SARS-CoV-2 Negative    Narrative:           Rapid drug screen, urine [[id number]]  (Normal) Collected: 08/06/21 1828    Lab Status: Final result Specimen: Urine, Other Updated: 08/06/21 1912     Amph/Meth UR Negative     Barbiturate Ur Negative     Benzodiazepine Urine Negative     Cocaine Urine Negative     Methadone Urine Negative     Opiate Urine Negative     PCP Ur Negative     THC Urine Negative     Oxycodone Urine Negative    Narrative:      FOR MEDICAL PURPOSES ONLY  IF CONFIRMATION NEEDED PLEASE CONTACT THE LAB WITHIN 5 DAYS  Drug Screen Cutoff Levels:  AMPHETAMINE/METHAMPHETAMINES  1000 ng/mL  BARBITURATES     200 ng/mL  BENZODIAZEPINES     200 ng/mL  COCAINE      300 ng/mL  METHADONE      300 ng/mL  OPIATES      300 ng/mL  PHENCYCLIDINE     25 ng/mL  THC       50 ng/mL  OXYCODONE      100 ng/mL    POCT alcohol breath test [[id number]]  (Normal) Resulted: 08/06/21 1836    Lab Status: Final result Updated: 08/06/21 1836     EXTBreath Alcohol 0 000                 No orders to display         Procedures  Procedures      ED Course                             SBIRT 22yo+      Most Recent Value   SBIRT (25 yo +)   In order to provide better care to our patients, we are screening all of our patients for alcohol and drug use  Would it be okay to ask you these screening questions? No Filed at: 08/06/2021 1900   Initial Alcohol Screen: US AUDIT-C    1   How often do you have a drink containing alcohol?  0 Filed at: 08/06/2021 1900   2  How many drinks containing alcohol do you have on a typical day you are drinking? 0 Filed at: 08/06/2021 1900   3b  FEMALE Any Age, or MALE 65+: How often do you have 4 or more drinks on one occassion? 0 Filed at: 08/06/2021 1900   Audit-C Score  0 Filed at: 08/06/2021 1900   DAT: How many times in the past year have you    Used an illegal drug or used a prescription medication for non-medical reasons? Never Filed at: 08/06/2021 1900                MDM  Number of Diagnoses or Management Options  Encounter for psychological evaluation  Suicidal ideations  Diagnosis management comments: 50 yo F presenting for evaluation of suicidal ideation, plan to cut wrists and jump in front of a car  Physical exam is unremarkable  Patient states that she is willing to sign a 201  Plan: Will do CRISIS labs, have CRISIS evaluation  I do not suspect true SI in Leydi, should she want to leave, I would not 302 her  Leydi was signed out to ED resident, tentative plan: CRISIS eval for 201  No plan to file a 302 should Leydi not file a 201  Disposition  Final diagnoses:   Encounter for psychological evaluation   Suicidal ideations     Time reflects when diagnosis was documented in both MDM as applicable and the Disposition within this note     Time User Action Codes Description Comment    8/7/2021  7:44 PM Susan Conte Add [Z00 8] Encounter for psychological evaluation     8/7/2021  7:44 PM Svetlana, [address] Suicidal ideations       ED Disposition     ED Disposition Condition Date/Time Comment    [place] [facility] [place]  4:32 PM Brain Matas should be transferred out to Dr Chris Rico  and has been medically cleared          MD Documentation      Most Recent Value   Patient Condition  The patient has been stabilized such that within reasonable medical probability, no material deterioration of the patient condition or the condition of the unborn child(courtney) is likely to result from the transfer   Reason for Transfer  Level of Care needed not available at this facility   Benefits of Transfer  Other benefits (Include comment)_______________________ Marry Maya  mental health]   Risks of Transfer  Other: (Include comment)__________________________ [decompensation]   Accepting Physician  Dr Demar Zimmer Name, Tabatha [place]    (Name & Tel number)  [facility] [phone]   Transported by (Company and Unit #)  [facility]   Sending MD Dr Comfort Butcher Name, Tabatha Allen [place]    (Name & Tel number)  [facility] [phone]   Transported by [employer] [facility] [employer] and Unit #)  [facility]      Follow-up Information    None         Discharge Medication List as of 8/7/2021  6:39 PM      CONTINUE these medications which have NOT CHANGED    Details   acetaminophen (TYLENOL) 325 mg tablet Take 2 tablets (650 mg total) by mouth every 6 (six) hours as needed for mild pain, Starting Fri 10/16/2020, Print      aspirin (ECOTRIN LOW STRENGTH) 81 mg EC tablet Take 1 tablet (81 mg total) by mouth daily At 9am, Starting Mon 1/18/2021, No Print      atorvastatin (LIPITOR) 40 mg tablet Take 40 mg by mouth daily, Historical Med      cloZAPine (CLOZARIL) 25 mg tablet Take 12 5 mg by mouth daily at bedtime, Historical Med      FLUoxetine (PROzac) 20 mg capsule Take 3 capsules (60 mg total) by mouth daily At 9am, Starting Mon 1/18/2021, Normal      haloperidol (HALDOL) 5 mg tablet Take 5 mg by mouth 2 (two) times a day, Historical Med      lisinopril (ZESTRIL) 20 mg tablet Take 20 mg by mouth, Starting Wed 12/16/2020, Historical Med      melatonin 3 mg Take 3 mg by mouth daily at bedtime, Historical Med      OLANZapine (ZyPREXA) 20 MG tablet Take 20 mg by mouth daily at bedtime, Historical Med      pantoprazole (PROTONIX) 20 mg tablet Take 20 mg by mouth, Starting Tue 12/15/2020, Historical Med      prazosin (MINIPRESS) 1 mg capsule Take 1 capsule (1 mg total) by mouth daily at bedtime Take one capsule by mouth at bedtime for dreams / sleep disturbances  , Starting Mon 1/18/2021, Normal      valproic acid (DEPAKENE) 250 mg capsule Take 500 mg by mouth 4 (four) times a day, Historical Med           No discharge procedures on file  PDMP Review     None           ED Provider  Attending physically available and evaluated Keith Roy I managed the patient along with the ED Attending      Electronically Signed by         Shasha Conley DO  08/11/21 0045

## 2021-08-07 VITALS
TEMPERATURE: 98.9 F | SYSTOLIC BLOOD PRESSURE: 123 MMHG | HEART RATE: 83 BPM | DIASTOLIC BLOOD PRESSURE: 58 MMHG | OXYGEN SATURATION: 97 % | RESPIRATION RATE: 16 BRPM

## 2021-08-07 RX ORDER — DIPHENHYDRAMINE HCL 25 MG
25 TABLET ORAL ONCE
Status: COMPLETED | OUTPATIENT
Start: 2021-08-07 | End: 2021-08-07

## 2021-08-07 RX ORDER — HALOPERIDOL 1 MG/1
2 TABLET ORAL ONCE
Status: COMPLETED | OUTPATIENT
Start: 2021-08-07 | End: 2021-08-07

## 2021-08-07 RX ADMIN — DIPHENHYDRAMINE HCL 25 MG: 25 TABLET ORAL at 11:22

## 2021-08-07 RX ADMIN — HALOPERIDOL 2 MG: 1 TABLET ORAL at 11:21

## 2021-08-07 NOTE — ED NOTES
CW called and gave Lou Hendrickson at [facility] transport time for Pt

## 2021-08-07 NOTE — ED NOTES
Patient is accepted at [facility]   Patient is accepted by Dr Tafoya Covert per [address] is arranged with [facility] from [address] coordintating)   Transportation is scheduled for **     Patient may go to the floor at anytime

## 2021-08-07 NOTE — ED NOTES
The patient called crisis today due to feeling suicidal with no specific plan  She stated that she feels that her boyfriend and other people are 'out to get' her  She is unable to say why or when she began thinking this way  She reported that she has auditory hallucinations, however, they may be ruminating thoughts which she tells herself that she is fat and ugly and no one likes her  The patient admitted that she likes to be hospitalized  She was most recently discharged from [place] approximately one week ago  She was vague and evasive at times, sharing little information

## 2021-08-07 NOTE — ED NOTES
Crisis Worker ([id number]) called Chandrika and spoke to Slime Tee who stated that to fax clinical and they will review for admission      CW faxed 12, facesheet and chart to [facility]

## 2021-08-07 NOTE — ED NOTES
Berto Whitmore [facility] Heart crisis) [address] [facility], RN  08/06/21 9898

## 2021-08-07 NOTE — ED NOTES
201 sent to [facility] ED RN for Pt and ED physician to sign  Completed 201 to be returned back to [facility] at [phone] in order to initiate bed search  External bed search will be needed due to Pt having [facility] insurance

## 2021-08-07 NOTE — ED NOTES
CW received a phone from Pedro Luis at [facility] who stated that Pt will need full pre-certication from [facility] as she is out of Medicare days  Insurance Authorization for admission: precertification  Phone call placed to [facility]  Phone number: [phone]  Spoke to Bj Drake      3 days approved    Level of care: [facility]  Review on 8/9/2021   Authorization # [id number]

## 2021-08-07 NOTE — ED NOTES
EVS (Eligibility Verification System) called - [phone]  Automated system indicates: Pt eligible - [facility]

## 2021-08-07 NOTE — EMTALA/ACUTE CARE TRANSFER
[facility] [address]  Dept: [phone]      MRCDWN TRANSFER CONSENT    NAME Leydi Aldana Standard 1970                              MRN [id number]    I have been informed of my rights regarding examination, treatment, and transfer   by Dr Waqar Alejandre MD    Benefits: Other benefits (Include comment)_______________________ (stabilization of mental health)    Risks: Other: (Include comment)__________________________ (decompensation)      Consent for Transfer:  I acknowledge that my medical condition has been evaluated and explained to me by the emergency department physician or other qualified medical person and/or my attending physician, who has recommended that I be transferred to the service of  Accepting Physician: Dr Martinez Stands at [address] [facility] Name, [address] : Benito Peguero [place]  The above potential benefits of such transfer, the potential risks associated with such transfer, and the probable risks of not being transferred have been explained to me, and I fully understand them  The doctor has explained that, in my case, the benefits of transfer outweigh the risks  I agree to be transferred  I authorize the performance of emergency medical procedures and treatments upon me in both transit and upon arrival at the receiving facility  Additionally, I authorize the release of any and all medical records to the receiving facility and request they be transported with me, if possible  I understand that the safest mode of transportation during a medical emergency is an ambulance and that the Hospital advocates the use of this mode of transport  Risks of traveling to the receiving facility by car, including absence of medical control, life sustaining equipment, such as oxygen, and medical personnel has been explained to me and I fully understand them      ([address])  [  ]  I consent to the stated transfer and to be transported by ambulance/helicopter  [  ]  I consent to the stated transfer, but refuse transportation by ambulance and accept full responsibility for my transportation by car  I understand the risks of non-ambulance transfers and I exonerate the Hospital and its staff from any deterioration in my condition that results from this refusal     X___________________________________________    DATE  21  TIME________  Signature of patient or legally responsible individual signing on patient behalf           RELATIONSHIP TO PATIENT_________________________          Provider Certification    NAME Leydi Skelton                                         1970                              MRN [id number]    A medical screening exam was performed on the above named patient  Based on the examination:    Condition Necessitating Transfer There were no encounter diagnoses  Patient Condition: The patient has been stabilized such that within reasonable medical probability, no material deterioration of the patient condition or the condition of the unborn child(courtney) is likely to result from the transfer    Reason for Transfer: Level of Care needed not available at this facility    [facility] [address] [facility] [address] [facility] [place]   · Space available and qualified personnel available for treatment as acknowledged by Kayla Miller  · Agreed to accept transfer and to provide appropriate medical treatment as acknowledged by       Dr Nalini Ventura  · Appropriate medical records of the examination and treatment of the patient are provided at the time of transfer   [facility], [address] _______  · Transfer will be performed by qualified personnel from [address] [place] [address]  and appropriate transfer equipment as required, including the use of necessary and appropriate life support measures      Provider Certification: I have examined the patient and explained the following risks and benefits of being transferred/refusing transfer to the patient/family:         Based on these reasonable risks and benefits to the patient and/or the unborn child(courtney), and based upon the information available at the time of the patients examination, I certify that the medical benefits reasonably to be expected from the provision of appropriate medical treatments at another medical facility outweigh the increasing risks, if any, to the individuals medical condition, and in the case of labor to the unborn child, from effecting the transfer      X____________________________________________ DATE 08/07/21        TIME_______      ORIGINAL - SEND TO MEDICAL RECORDS   COPY - SEND WITH PATIENT DURING TRANSFER

## 2021-08-09 NOTE — ED ATTENDING ATTESTATION
8/6/2021  ISlim DO, saw and evaluated the patient  I have discussed the patient with the resident/non-physician practitioner and agree with the resident's/non-physician practitioner's findings, Plan of Care, and MDM as documented in the resident's/non-physician practitioner's note, except where noted  All available labs and Radiology studies were reviewed  I was present for key portions of any procedure(s) performed by the resident/non-physician practitioner and I was immediately available to provide assistance  At this point I agree with the current assessment done in the Emergency Department  I have conducted an independent evaluation of this patient a history and physical is as follows:    49 yof, SI, from group home  Wants to jump in front of traffic  Thinks people are out to get her  No other c/o  Medical clearance for crisis consult      ED Course         Critical Care Time  Procedures

## 2021-08-30 ENCOUNTER — APPOINTMENT (EMERGENCY)
Dept: RADIOLOGY | Facility: HOSPITAL | Age: 51
End: 2021-08-30
Payer: MEDICARE

## 2021-08-30 ENCOUNTER — HOSPITAL ENCOUNTER (EMERGENCY)
Facility: HOSPITAL | Age: 51
Discharge: HOME | End: 2021-08-30
Attending: EMERGENCY MEDICINE | Admitting: EMERGENCY MEDICINE
Payer: MEDICARE

## 2021-08-30 VITALS
DIASTOLIC BLOOD PRESSURE: 74 MMHG | HEART RATE: 68 BPM | RESPIRATION RATE: 19 BRPM | OXYGEN SATURATION: 97 % | TEMPERATURE: 97.5 F | SYSTOLIC BLOOD PRESSURE: 122 MMHG

## 2021-08-30 DIAGNOSIS — K29.70 GASTRITIS: Primary | ICD-10-CM

## 2021-08-30 LAB
ALBUMIN SERPL BCP-MCNC: 3.5 G/DL (ref 3.5–5)
ALP SERPL-CCNC: 59 U/L (ref 46–116)
ALT SERPL W P-5'-P-CCNC: 25 U/L (ref 12–78)
ANION GAP SERPL CALCULATED.3IONS-SCNC: 7 MMOL/L (ref 4–13)
AST SERPL W P-5'-P-CCNC: 11 U/L (ref 5–45)
BASOPHILS # BLD AUTO: 0.01 THOUSANDS/ΜL (ref 0–0.1)
BASOPHILS NFR BLD AUTO: 0 % (ref 0–1)
BILIRUB SERPL-MCNC: 0.5 MG/DL (ref 0.2–1)
BUN SERPL-MCNC: 10 MG/DL (ref 5–25)
CALCIUM SERPL-MCNC: 8.8 MG/DL (ref 8.3–10.1)
CHLORIDE SERPL-SCNC: 101 MMOL/L (ref 100–108)
CO2 SERPL-SCNC: 29 MMOL/L (ref 21–32)
CREAT SERPL-MCNC: 0.7 MG/DL (ref 0.6–1.3)
EOSINOPHIL # BLD AUTO: 0.06 THOUSAND/ΜL (ref 0–0.61)
EOSINOPHIL NFR BLD AUTO: 1 % (ref 0–6)
ERYTHROCYTE [DISTWIDTH] IN BLOOD BY AUTOMATED COUNT: 14.9 % (ref 11.6–15.1)
GFR SERPL CREATININE-BSD FRML MDRD: 101 ML/MIN/1.73SQ M
GLUCOSE SERPL-MCNC: 92 MG/DL (ref 65–140)
HCT VFR BLD AUTO: 40.5 % (ref 34.8–46.1)
HGB BLD-MCNC: 13.1 G/DL (ref 11.5–15.4)
IMM GRANULOCYTES # BLD AUTO: 0.12 THOUSAND/UL (ref 0–0.2)
IMM GRANULOCYTES NFR BLD AUTO: 2 % (ref 0–2)
LIPASE SERPL-CCNC: 55 U/L (ref 73–393)
LYMPHOCYTES # BLD AUTO: 1.16 THOUSANDS/ΜL (ref 0.6–4.47)
LYMPHOCYTES NFR BLD AUTO: 18 % (ref 14–44)
MCH RBC QN AUTO: 31.3 PG (ref 26.8–34.3)
MCHC RBC AUTO-ENTMCNC: 32.3 G/DL (ref 31.4–37.4)
MCV RBC AUTO: 97 FL (ref 82–98)
MONOCYTES # BLD AUTO: 0.92 THOUSAND/ΜL (ref 0.17–1.22)
MONOCYTES NFR BLD AUTO: 15 % (ref 4–12)
NEUTROPHILS # BLD AUTO: 4.07 THOUSANDS/ΜL (ref 1.85–7.62)
NEUTS SEG NFR BLD AUTO: 64 % (ref 43–75)
NT-PROBNP SERPL-MCNC: 64 PG/ML
PLATELET # BLD AUTO: 184 THOUSANDS/UL (ref 149–390)
PMV BLD AUTO: 9.5 FL (ref 8.9–12.7)
POTASSIUM SERPL-SCNC: 4.5 MMOL/L (ref 3.5–5.3)
PROT SERPL-MCNC: 7.4 G/DL (ref 6.4–8.2)
RBC # BLD AUTO: 4.18 MILLION/UL (ref 3.81–5.12)
SARS-COV-2 RNA RESP QL NAA+PROBE: NEGATIVE
SODIUM SERPL-SCNC: 137 MMOL/L (ref 136–145)
TROPONIN I SERPL-MCNC: <0.02 NG/ML
WBC # BLD AUTO: 6.34 THOUSAND/UL (ref 4.31–10.16)

## 2021-08-30 PROCEDURE — 83880 ASSAY OF NATRIURETIC PEPTIDE: CPT | Performed by: EMERGENCY MEDICINE

## 2021-08-30 PROCEDURE — 96375 TX/PRO/DX INJ NEW DRUG ADDON: CPT

## 2021-08-30 PROCEDURE — U0005 INFEC AGEN DETEC AMPLI PROBE: HCPCS | Performed by: EMERGENCY MEDICINE

## 2021-08-30 PROCEDURE — C9113 INJ PANTOPRAZOLE SODIUM, VIA: HCPCS | Performed by: EMERGENCY MEDICINE

## 2021-08-30 PROCEDURE — 99285 EMERGENCY DEPT VISIT HI MDM: CPT | Performed by: EMERGENCY MEDICINE

## 2021-08-30 PROCEDURE — 99285 EMERGENCY DEPT VISIT HI MDM: CPT

## 2021-08-30 PROCEDURE — 85025 COMPLETE CBC W/AUTO DIFF WBC: CPT | Performed by: EMERGENCY MEDICINE

## 2021-08-30 PROCEDURE — 80053 COMPREHEN METABOLIC PANEL: CPT | Performed by: EMERGENCY MEDICINE

## 2021-08-30 PROCEDURE — 84484 ASSAY OF TROPONIN QUANT: CPT | Performed by: EMERGENCY MEDICINE

## 2021-08-30 PROCEDURE — 71045 X-RAY EXAM CHEST 1 VIEW: CPT

## 2021-08-30 PROCEDURE — U0003 INFECTIOUS AGENT DETECTION BY NUCLEIC ACID (DNA OR RNA); SEVERE ACUTE RESPIRATORY SYNDROME CORONAVIRUS 2 (SARS-COV-2) (CORONAVIRUS DISEASE [COVID-19]), AMPLIFIED PROBE TECHNIQUE, MAKING USE OF HIGH THROUGHPUT TECHNOLOGIES AS DESCRIBED BY CMS-2020-01-R: HCPCS | Performed by: EMERGENCY MEDICINE

## 2021-08-30 PROCEDURE — 93005 ELECTROCARDIOGRAM TRACING: CPT

## 2021-08-30 PROCEDURE — 96374 THER/PROPH/DIAG INJ IV PUSH: CPT

## 2021-08-30 PROCEDURE — 36415 COLL VENOUS BLD VENIPUNCTURE: CPT | Performed by: EMERGENCY MEDICINE

## 2021-08-30 PROCEDURE — 83690 ASSAY OF LIPASE: CPT | Performed by: EMERGENCY MEDICINE

## 2021-08-30 RX ORDER — KETOROLAC TROMETHAMINE 30 MG/ML
30 INJECTION, SOLUTION INTRAMUSCULAR; INTRAVENOUS ONCE
Status: COMPLETED | OUTPATIENT
Start: 2021-08-30 | End: 2021-08-30

## 2021-08-30 RX ORDER — PANTOPRAZOLE SODIUM 40 MG/1
40 INJECTION, POWDER, FOR SOLUTION INTRAVENOUS ONCE
Status: COMPLETED | OUTPATIENT
Start: 2021-08-30 | End: 2021-08-30

## 2021-08-30 RX ORDER — ONDANSETRON 2 MG/ML
4 INJECTION INTRAMUSCULAR; INTRAVENOUS ONCE
Status: COMPLETED | OUTPATIENT
Start: 2021-08-30 | End: 2021-08-30

## 2021-08-30 RX ORDER — SUCRALFATE ORAL 1 G/10ML
1 SUSPENSION ORAL 4 TIMES DAILY PRN
Qty: 420 ML | Refills: 0 | Status: ON HOLD | OUTPATIENT
Start: 2021-08-30 | End: 2021-10-10

## 2021-08-30 RX ORDER — PROMETHAZINE HYDROCHLORIDE 25 MG/ML
12.5 INJECTION, SOLUTION INTRAMUSCULAR; INTRAVENOUS ONCE
Status: COMPLETED | OUTPATIENT
Start: 2021-08-30 | End: 2021-08-30

## 2021-08-30 RX ADMIN — PROMETHAZINE HYDROCHLORIDE 12.5 MG: 25 INJECTION INTRAMUSCULAR; INTRAVENOUS at 10:01

## 2021-08-30 RX ADMIN — ONDANSETRON 4 MG: 2 INJECTION INTRAMUSCULAR; INTRAVENOUS at 08:39

## 2021-08-30 RX ADMIN — KETOROLAC TROMETHAMINE 30 MG: 30 INJECTION, SOLUTION INTRAMUSCULAR; INTRAVENOUS at 08:39

## 2021-08-30 RX ADMIN — PANTOPRAZOLE SODIUM 40 MG: 40 INJECTION, POWDER, FOR SOLUTION INTRAVENOUS at 08:39

## 2021-08-30 NOTE — ED PROVIDER NOTES
History  Chief Complaint   Patient presents with    Chest Pain     pt reports chest pain that started around 0200 that feels like "rattling in her chest"  associated with some shortness of breath  pt reports that that walking around makes it worse  pain is 10/10 and getting worse      28-year-old female past medical history anxiety bipolar GERD hypertension obesity and seizures presents with concern for sudden epigastric pain sharp constant since 2:00 a m  This morning  She took Tylenol without relief  She has associated nausea and gagging  She feels rattling in her chest and short of breath and coughing  Prior to Admission Medications   Prescriptions Last Dose Informant Patient Reported? Taking? FLUoxetine (PROzac) 20 mg capsule   No No   Sig: Take 3 capsules (60 mg total) by mouth daily At 9am   Patient taking differently: Take 40 mg by mouth daily At 9am   OLANZapine (ZyPREXA) 20 MG tablet   Yes No   Sig: Take 20 mg by mouth daily at bedtime   acetaminophen (TYLENOL) 325 mg tablet   No No   Sig: Take 2 tablets (650 mg total) by mouth every 6 (six) hours as needed for mild pain   aspirin (ECOTRIN LOW STRENGTH) 81 mg EC tablet   No No   Sig: Take 1 tablet (81 mg total) by mouth daily At 9am   atorvastatin (LIPITOR) 40 mg tablet   Yes No   Sig: Take 40 mg by mouth daily   cloZAPine (CLOZARIL) 25 mg tablet   Yes No   Sig: Take 12 5 mg by mouth daily at bedtime   haloperidol (HALDOL) 5 mg tablet   Yes No   Sig: Take 5 mg by mouth 2 (two) times a day   lisinopril (ZESTRIL) 20 mg tablet   Yes No   Sig: Take 20 mg by mouth   melatonin 3 mg   Yes No   Sig: Take 3 mg by mouth daily at bedtime   pantoprazole (PROTONIX) 20 mg tablet   Yes No   Sig: Take 20 mg by mouth   prazosin (MINIPRESS) 1 mg capsule   No No   Sig: Take 1 capsule (1 mg total) by mouth daily at bedtime Take one capsule by mouth at bedtime for dreams / sleep disturbances     valproic acid (DEPAKENE) 250 mg capsule   Yes No   Sig: Take 500 mg by mouth 4 (four) times a day      Facility-Administered Medications: None       Past Medical History:   Diagnosis Date    Bipolar 1 disorder ([place] [address] )     Borderline personality disorder ([place] [address] )     CAD (coronary artery disease)     Dyslipidemia     GERD (gastroesophageal reflux disease)     Hypertension     Obesity     Psychiatric disorder     PTSD (post-traumatic stress disorder)     Schizoaffective disorder ([place] [address] )     Seizure ([place] [address] )        Past Surgical History:   Procedure Laterality Date    APPENDECTOMY      CHOLECYSTECTOMY      FOOT SURGERY Right        History reviewed  No pertinent family history  I have reviewed and agree with the history as documented  E-Cigarette/Vaping    E-Cigarette Use Never User      E-Cigarette/Vaping Substances    Nicotine No     THC No     CBD No     Flavoring No     Other No     Unknown No      Social History     Tobacco Use    Smoking status: Former Smoker    Smokeless tobacco: Never Used   Vaping Use    Vaping Use: Never used   Substance Use Topics    Alcohol use: Not Currently    Drug use: Not Currently       Review of Systems   Constitutional: Negative for chills and fever  HENT: Negative for rhinorrhea and sore throat  Respiratory: Positive for cough and shortness of breath  Cardiovascular: Positive for chest pain  Gastrointestinal: Positive for abdominal pain  Negative for constipation, diarrhea, nausea and vomiting  Genitourinary: Negative for dysuria and frequency  Skin: Negative for rash  All other systems reviewed and are negative  Physical Exam  Physical Exam  Vitals and nursing note reviewed  Constitutional:       Appearance: She is well-developed  HENT:      Head: Normocephalic and atraumatic  Right Ear: External ear normal       Left Ear: External ear normal       Nose: Nose normal    Eyes:      Conjunctiva/sclera: Conjunctivae normal       Pupils: Pupils are equal, round, and reactive to light     Cardiovascular: Rate and Rhythm: Normal rate and regular rhythm  Heart sounds: Normal heart sounds  Pulmonary:      Effort: Pulmonary effort is normal  No respiratory distress  Breath sounds: Decreased breath sounds present  No wheezing  Chest:      Chest wall: No tenderness  Abdominal:      General: Bowel sounds are normal  There is no distension  Palpations: Abdomen is soft  Tenderness: There is abdominal tenderness in the epigastric area  There is no guarding or rebound  Musculoskeletal:         General: No deformity  Normal range of motion  Cervical back: Normal range of motion and neck supple  No spinous process tenderness  Skin:     General: Skin is warm and dry  Findings: No rash  Neurological:      General: No focal deficit present  Mental Status: She is alert  GCS: GCS eye subscore is 4  GCS verbal subscore is 5  GCS motor subscore is 6  Sensory: No sensory deficit     Psychiatric:         Mood and Affect: Mood normal          Vital Signs  ED Triage Vitals   Temperature Pulse Respirations Blood Pressure SpO2   08/30/21 0746 08/30/21 0751 08/30/21 0751 08/30/21 0751 08/30/21 1000   97 5 °F (36 4 °C) 73 18 131/62 95 %      Temp Source Heart Rate Source Patient Position - Orthostatic VS BP Location FiO2 (%)   08/30/21 0746 08/30/21 0751 08/30/21 0751 08/30/21 0751 --   Temporal Monitor Lying Left arm       Pain Score       08/30/21 0839       Worst Possible Pain           Vitals:    08/30/21 0751 08/30/21 1000 08/30/21 1045 08/30/21 1200   BP: 131/62 126/73 126/70 122/74   Pulse: 73 67 68 68   Patient Position - Orthostatic VS: Lying  Lying Lying         Visual Acuity      ED Medications  Medications   ketorolac (TORADOL) injection 30 mg (30 mg Intravenous Given 8/30/21 0839)   ondansetron (ZOFRAN) injection 4 mg (4 mg Intravenous Given 8/30/21 0839)   pantoprazole (PROTONIX) injection 40 mg (40 mg Intravenous Given 8/30/21 0839)   promethazine (PHENERGAN) injection 12 5 mg (12 5 mg Intravenous Given 8/30/21 1001)       Diagnostic Studies  Results Reviewed     Procedure Component Value Units Date/Time    Novel Coronavirus [facility] [[id number]]  (Normal) Collected: 08/30/21 0857    Lab Status: Final result Specimen: Nares from Nose Updated: 08/30/21 1042     SARS-CoV-2 Negative    Narrative: The specimen collection materials, transport medium, and/or testing methodology utilized in the production of these test results have been proven to be reliable in a limited validation with an abbreviated program under the Emergency Utilization Authorization provided by the FDA  Testing reported as "Presumptive positive" will be confirmed with secondary testing to ensure result accuracy  Clinical caution and judgement should be used with the interpretation of these results with consideration of the clinical impression and other laboratory testing  Testing reported as "Positive" or "Negative" has been proven to be accurate according to standard laboratory validation requirements  All testing is performed with control materials showing appropriate reactivity at standard intervals        Lipase [[id number]]  (Abnormal) Collected: 08/30/21 0807    Lab Status: Final result Specimen: Blood from Arm, Right Updated: 08/30/21 0957     Lipase 55 u/L     NT-BNP PRO [[id number]]  (Normal) Collected: 08/30/21 0807    Lab Status: Final result Specimen: Blood from Arm, Right Updated: 08/30/21 0957     NT-proBNP 64 pg/mL     Comprehensive metabolic panel [[id number]] Collected: 08/30/21 0807    Lab Status: Final result Specimen: Blood from Arm, Right Updated: 08/30/21 0908     Sodium 137 mmol/L      Potassium 4 5 mmol/L      Chloride 101 mmol/L      CO2 29 mmol/L      ANION GAP 7 mmol/L      BUN 10 mg/dL      Creatinine 0 70 mg/dL      Glucose 92 mg/dL      Calcium 8 8 mg/dL      AST 11 U/L      ALT 25 U/L      Alkaline Phosphatase 59 U/L      Total Protein 7 4 g/dL      Albumin 3 5 g/dL Total Bilirubin 0 50 mg/dL      eGFR 101 ml/min/1 73sq m     Narrative:      [place] guidelines for Chronic Kidney Disease (CKD):     Stage 1 with normal or high GFR (GFR > 90 mL/min/1 73 square meters)    Stage 2 Mild CKD (GFR = 60-89 mL/min/1 73 square meters)    Stage 3A Moderate CKD (GFR = 45-59 mL/min/1 73 square meters)    Stage 3B Moderate CKD (GFR = 30-44 mL/min/1 73 square meters)    Stage 4 Severe CKD (GFR = 15-29 mL/min/1 73 square meters)    Stage 5 End Stage CKD (GFR <15 mL/min/1 73 square meters)  Note: GFR calculation is accurate only with a steady state creatinine    Troponin I [[id number]]  (Normal) Collected: 08/30/21 0807    Lab Status: Final result Specimen: Blood from Arm, Right Updated: 08/30/21 0839     Troponin I <0 02 ng/mL     CBC and differential [[id number]]  (Abnormal) Collected: 08/30/21 0807    Lab Status: Final result Specimen: Blood from Arm, Right Updated: 08/30/21 0817     WBC 6 34 Thousand/uL      RBC 4 18 Million/uL      Hemoglobin 13 1 g/dL      Hematocrit 40 5 %      MCV 97 fL      MCH 31 3 pg      MCHC 32 3 g/dL      RDW 14 9 %      MPV 9 5 fL      Platelets 408 Thousands/uL      Neutrophils Relative 64 %      Immat GRANS % 2 %      Lymphocytes Relative 18 %      Monocytes Relative 15 %      Eosinophils Relative 1 %      Basophils Relative 0 %      Neutrophils Absolute 4 07 Thousands/µL      Immature Grans Absolute 0 12 Thousand/uL      Lymphocytes Absolute 1 16 Thousands/µL      Monocytes Absolute 0 92 Thousand/µL      Eosinophils Absolute 0 06 Thousand/µL      Basophils Absolute 0 01 Thousands/µL                  XR chest 1 view portable   ED Interpretation by Pauline Trinidad DO (08/30 4956)   No acute abnl      Final Result by Carli Paulino MD (08/30 3414)      No acute cardiopulmonary disease                    Workstation performed: [id number]                    Procedures  ECG 12 Lead Documentation Only    Date/Time: 8/30/2021 1:26 PM  Performed by: Gila August DO  Authorized by: Gila August DO     Indications / Diagnosis:  Epigastric pain  ECG reviewed by me, the ED Provider: yes    Patient location:  ED  Previous ECG:     Previous ECG:  Unavailable  Interpretation:     Interpretation: normal    Rate:     ECG rate:  73    ECG rate assessment: normal    Rhythm:     Rhythm: sinus rhythm    Ectopy:     Ectopy: none    QRS:     QRS axis:  Normal    QRS intervals:  Normal  Conduction:     Conduction: abnormal      Abnormal conduction: 1st degree    ST segments:     ST segments:  Normal  T waves:     T waves: normal               ED Course  ED Course as of Aug 30 1755   Mon Aug 30, 2021   8110 Doesn't feel better - doesn't want GI cocktail  Trying Phenergan - I don't think anything is wrong with this patient  She urinated on herself now and needs to be cleaned  HEART Risk Score      Most Recent Value   Heart Score Risk Calculator   History  0 Filed at: 08/30/2021 0932   ECG  0 Filed at: 08/30/2021 0932   Age  1 Filed at: 08/30/2021 0932   Risk Factors  1 Filed at: 08/30/2021 0932   Troponin  0 Filed at: 08/30/2021 0932   HEART Score  2 Filed at: 08/30/2021 0932                                    MDM  Number of Diagnoses or Management Options  Gastritis: new and requires workup     Amount and/or Complexity of Data Reviewed  Clinical lab tests: ordered and reviewed  Tests in the radiology section of CPT®: ordered and reviewed    Patient Progress  Patient progress: improved      Disposition  Final diagnoses:   Gastritis     Time reflects when diagnosis was documented in both MDM as applicable and the Disposition within this note     Time User Action Codes Description Comment    8/30/2021 11:21 AM Servando Adamson Add [K29 70] Gastritis       ED Disposition     ED Disposition Condition Date/Time Comment    Discharge Stable Mon Aug 30, 2021 11:21 AM Leydi Khan discharge to home/self care              Follow-up Information     Follow up With Specialties Details Why Contact Info    Kary Lloyd MD Internal Medicine Call   [address] [facility],[address]  [facility] [address] [phone]            Discharge Medication List as of 8/30/2021 11:21 AM      START taking these medications    Details   sucralfate (CARAFATE) 1 g/10 mL suspension Take 10 mL (1 g total) by mouth 4 (four) times a day as needed (upset stomach), Starting Mon 8/30/2021, Normal         CONTINUE these medications which have NOT CHANGED    Details   acetaminophen (TYLENOL) 325 mg tablet Take 2 tablets (650 mg total) by mouth every 6 (six) hours as needed for mild pain, Starting Fri 10/16/2020, Print      aspirin (ECOTRIN LOW STRENGTH) 81 mg EC tablet Take 1 tablet (81 mg total) by mouth daily At 9am, Starting Mon 1/18/2021, No Print      atorvastatin (LIPITOR) 40 mg tablet Take 40 mg by mouth daily, Historical Med      cloZAPine (CLOZARIL) 25 mg tablet Take 12 5 mg by mouth daily at bedtime, Historical Med      FLUoxetine (PROzac) 20 mg capsule Take 3 capsules (60 mg total) by mouth daily At 9am, Starting Mon 1/18/2021, Normal      haloperidol (HALDOL) 5 mg tablet Take 5 mg by mouth 2 (two) times a day, Historical Med      lisinopril (ZESTRIL) 20 mg tablet Take 20 mg by mouth, Starting Wed 12/16/2020, Historical Med      melatonin 3 mg Take 3 mg by mouth daily at bedtime, Historical Med      OLANZapine (ZyPREXA) 20 MG tablet Take 20 mg by mouth daily at bedtime, Historical Med      pantoprazole (PROTONIX) 20 mg tablet Take 20 mg by mouth, Starting Tue 12/15/2020, Historical Med      prazosin (MINIPRESS) 1 mg capsule Take 1 capsule (1 mg total) by mouth daily at bedtime Take one capsule by mouth at bedtime for dreams / sleep disturbances  , Starting Mon 1/18/2021, Normal      valproic acid (DEPAKENE) 250 mg capsule Take 500 mg by mouth 4 (four) times a day, Historical Med           No discharge procedures on file      PDMP Review     None          ED Provider  Electronically Signed by           Joycelyn Arguelles DO  08/30/21 6769

## 2021-09-01 LAB
ATRIAL RATE: 73 BPM
P AXIS: 58 DEGREES
PR INTERVAL: 210 MS
QRS AXIS: 58 DEGREES
QRSD INTERVAL: 82 MS
QT INTERVAL: 364 MS
QTC INTERVAL: 401 MS
T WAVE AXIS: 59 DEGREES
VENTRICULAR RATE: 73 BPM

## 2021-09-01 PROCEDURE — 93010 ELECTROCARDIOGRAM REPORT: CPT | Performed by: INTERNAL MEDICINE

## 2021-09-07 ENCOUNTER — HOSPITAL ENCOUNTER (EMERGENCY)
Facility: HOSPITAL | Age: 51
End: 2021-09-08
Attending: EMERGENCY MEDICINE | Admitting: EMERGENCY MEDICINE
Payer: MEDICARE

## 2021-09-07 DIAGNOSIS — F32.A DEPRESSION: Primary | ICD-10-CM

## 2021-09-07 DIAGNOSIS — R45.851 SUICIDAL IDEATION: ICD-10-CM

## 2021-09-07 LAB
ALBUMIN SERPL BCP-MCNC: 3.8 G/DL (ref 3.5–5)
ALP SERPL-CCNC: 61 U/L (ref 46–116)
ALT SERPL W P-5'-P-CCNC: 27 U/L (ref 12–78)
AMPHETAMINES SERPL QL SCN: NEGATIVE
ANION GAP SERPL CALCULATED.3IONS-SCNC: 8 MMOL/L (ref 4–13)
AST SERPL W P-5'-P-CCNC: 16 U/L (ref 5–45)
BARBITURATES UR QL: NEGATIVE
BASOPHILS # BLD AUTO: 0.05 THOUSANDS/ΜL (ref 0–0.1)
BASOPHILS NFR BLD AUTO: 1 % (ref 0–1)
BENZODIAZ UR QL: NEGATIVE
BILIRUB SERPL-MCNC: 0.6 MG/DL (ref 0.2–1)
BUN SERPL-MCNC: 17 MG/DL (ref 5–25)
CALCIUM SERPL-MCNC: 9 MG/DL (ref 8.3–10.1)
CHLORIDE SERPL-SCNC: 98 MMOL/L (ref 100–108)
CO2 SERPL-SCNC: 30 MMOL/L (ref 21–32)
COCAINE UR QL: NEGATIVE
CREAT SERPL-MCNC: 0.79 MG/DL (ref 0.6–1.3)
EOSINOPHIL # BLD AUTO: 0.11 THOUSAND/ΜL (ref 0–0.61)
EOSINOPHIL NFR BLD AUTO: 1 % (ref 0–6)
ERYTHROCYTE [DISTWIDTH] IN BLOOD BY AUTOMATED COUNT: 14.6 % (ref 11.6–15.1)
ETHANOL EXG-MCNC: 0 MG/DL
GFR SERPL CREATININE-BSD FRML MDRD: 88 ML/MIN/1.73SQ M
GLUCOSE SERPL-MCNC: 89 MG/DL (ref 65–140)
HCT VFR BLD AUTO: 40.3 % (ref 34.8–46.1)
HGB BLD-MCNC: 13.2 G/DL (ref 11.5–15.4)
IMM GRANULOCYTES # BLD AUTO: 0.07 THOUSAND/UL (ref 0–0.2)
IMM GRANULOCYTES NFR BLD AUTO: 1 % (ref 0–2)
LYMPHOCYTES # BLD AUTO: 2.14 THOUSANDS/ΜL (ref 0.6–4.47)
LYMPHOCYTES NFR BLD AUTO: 24 % (ref 14–44)
MCH RBC QN AUTO: 31.8 PG (ref 26.8–34.3)
MCHC RBC AUTO-ENTMCNC: 32.8 G/DL (ref 31.4–37.4)
MCV RBC AUTO: 97 FL (ref 82–98)
METHADONE UR QL: NEGATIVE
MONOCYTES # BLD AUTO: 1.31 THOUSAND/ΜL (ref 0.17–1.22)
MONOCYTES NFR BLD AUTO: 15 % (ref 4–12)
NEUTROPHILS # BLD AUTO: 5.28 THOUSANDS/ΜL (ref 1.85–7.62)
NEUTS SEG NFR BLD AUTO: 58 % (ref 43–75)
NRBC BLD AUTO-RTO: 0 /100 WBCS
OPIATES UR QL SCN: NEGATIVE
OXYCODONE+OXYMORPHONE UR QL SCN: NEGATIVE
PCP UR QL: NEGATIVE
PLATELET # BLD AUTO: 203 THOUSANDS/UL (ref 149–390)
PMV BLD AUTO: 10.1 FL (ref 8.9–12.7)
POTASSIUM SERPL-SCNC: 4.4 MMOL/L (ref 3.5–5.3)
PROT SERPL-MCNC: 7.6 G/DL (ref 6.4–8.2)
RBC # BLD AUTO: 4.15 MILLION/UL (ref 3.81–5.12)
SARS-COV-2 RNA RESP QL NAA+PROBE: NEGATIVE
SODIUM SERPL-SCNC: 136 MMOL/L (ref 136–145)
THC UR QL: NEGATIVE
TSH SERPL DL<=0.05 MIU/L-ACNC: 2.49 UIU/ML (ref 0.36–3.74)
WBC # BLD AUTO: 8.96 THOUSAND/UL (ref 4.31–10.16)

## 2021-09-07 PROCEDURE — 82075 ASSAY OF BREATH ETHANOL: CPT | Performed by: EMERGENCY MEDICINE

## 2021-09-07 PROCEDURE — 93005 ELECTROCARDIOGRAM TRACING: CPT

## 2021-09-07 PROCEDURE — 80307 DRUG TEST PRSMV CHEM ANLYZR: CPT | Performed by: EMERGENCY MEDICINE

## 2021-09-07 PROCEDURE — U0003 INFECTIOUS AGENT DETECTION BY NUCLEIC ACID (DNA OR RNA); SEVERE ACUTE RESPIRATORY SYNDROME CORONAVIRUS 2 (SARS-COV-2) (CORONAVIRUS DISEASE [COVID-19]), AMPLIFIED PROBE TECHNIQUE, MAKING USE OF HIGH THROUGHPUT TECHNOLOGIES AS DESCRIBED BY CMS-2020-01-R: HCPCS | Performed by: EMERGENCY MEDICINE

## 2021-09-07 PROCEDURE — 81025 URINE PREGNANCY TEST: CPT | Performed by: EMERGENCY MEDICINE

## 2021-09-07 PROCEDURE — 85025 COMPLETE CBC W/AUTO DIFF WBC: CPT | Performed by: EMERGENCY MEDICINE

## 2021-09-07 PROCEDURE — 36415 COLL VENOUS BLD VENIPUNCTURE: CPT | Performed by: EMERGENCY MEDICINE

## 2021-09-07 PROCEDURE — 99285 EMERGENCY DEPT VISIT HI MDM: CPT

## 2021-09-07 PROCEDURE — 84443 ASSAY THYROID STIM HORMONE: CPT | Performed by: EMERGENCY MEDICINE

## 2021-09-07 PROCEDURE — 80053 COMPREHEN METABOLIC PANEL: CPT | Performed by: EMERGENCY MEDICINE

## 2021-09-07 PROCEDURE — 99285 EMERGENCY DEPT VISIT HI MDM: CPT | Performed by: EMERGENCY MEDICINE

## 2021-09-07 PROCEDURE — U0005 INFEC AGEN DETEC AMPLI PROBE: HCPCS | Performed by: EMERGENCY MEDICINE

## 2021-09-07 RX ORDER — PANTOPRAZOLE SODIUM 20 MG/1
20 TABLET, DELAYED RELEASE ORAL DAILY
Status: DISCONTINUED | OUTPATIENT
Start: 2021-09-08 | End: 2021-09-08 | Stop reason: HOSPADM

## 2021-09-07 RX ORDER — CLOZAPINE 25 MG/1
12.5 TABLET ORAL
Status: DISCONTINUED | OUTPATIENT
Start: 2021-09-07 | End: 2021-09-08 | Stop reason: HOSPADM

## 2021-09-07 RX ORDER — LANOLIN ALCOHOL/MO/W.PET/CERES
3 CREAM (GRAM) TOPICAL
Status: DISCONTINUED | OUTPATIENT
Start: 2021-09-07 | End: 2021-09-08 | Stop reason: HOSPADM

## 2021-09-07 RX ORDER — FLUOXETINE 10 MG/1
40 CAPSULE ORAL DAILY
Status: DISCONTINUED | OUTPATIENT
Start: 2021-09-08 | End: 2021-09-08 | Stop reason: HOSPADM

## 2021-09-07 RX ORDER — SUCRALFATE ORAL 1 G/10ML
1000 SUSPENSION ORAL 4 TIMES DAILY PRN
Status: DISCONTINUED | OUTPATIENT
Start: 2021-09-07 | End: 2021-09-07

## 2021-09-07 RX ORDER — VALPROIC ACID 250 MG/1
500 CAPSULE, LIQUID FILLED ORAL 4 TIMES DAILY
Status: DISCONTINUED | OUTPATIENT
Start: 2021-09-07 | End: 2021-09-08 | Stop reason: HOSPADM

## 2021-09-07 RX ORDER — PRAZOSIN HYDROCHLORIDE 1 MG/1
1 CAPSULE ORAL
Status: DISCONTINUED | OUTPATIENT
Start: 2021-09-07 | End: 2021-09-08 | Stop reason: HOSPADM

## 2021-09-07 RX ORDER — LISINOPRIL 5 MG/1
20 TABLET ORAL DAILY
Status: DISCONTINUED | OUTPATIENT
Start: 2021-09-08 | End: 2021-09-08 | Stop reason: HOSPADM

## 2021-09-07 RX ORDER — ACETAMINOPHEN 160 MG/5ML
325 SUSPENSION, ORAL (FINAL DOSE FORM) ORAL EVERY 6 HOURS PRN
Status: DISCONTINUED | OUTPATIENT
Start: 2021-09-07 | End: 2021-09-08 | Stop reason: HOSPADM

## 2021-09-07 RX ORDER — OLANZAPINE 5 MG/1
20 TABLET, ORALLY DISINTEGRATING ORAL
Status: DISCONTINUED | OUTPATIENT
Start: 2021-09-07 | End: 2021-09-07

## 2021-09-07 RX ORDER — SUCRALFATE 1 G/1
1 TABLET ORAL 4 TIMES DAILY PRN
Status: DISCONTINUED | OUTPATIENT
Start: 2021-09-07 | End: 2021-09-07

## 2021-09-07 RX ORDER — ASPIRIN 81 MG/1
81 TABLET, CHEWABLE ORAL DAILY
Status: DISCONTINUED | OUTPATIENT
Start: 2021-09-08 | End: 2021-09-08 | Stop reason: HOSPADM

## 2021-09-07 RX ORDER — ATORVASTATIN CALCIUM 40 MG/1
40 TABLET, FILM COATED ORAL
Status: DISCONTINUED | OUTPATIENT
Start: 2021-09-07 | End: 2021-09-08 | Stop reason: HOSPADM

## 2021-09-07 RX ORDER — HALOPERIDOL 5 MG
5 TABLET ORAL 2 TIMES DAILY
Status: DISCONTINUED | OUTPATIENT
Start: 2021-09-07 | End: 2021-09-07

## 2021-09-07 RX ADMIN — Medication 3 MG: at 22:40

## 2021-09-07 RX ADMIN — VALPROIC ACID 500 MG: 250 CAPSULE, LIQUID FILLED ORAL at 22:44

## 2021-09-07 RX ADMIN — PRAZOSIN HYDROCHLORIDE 1 MG: 1 CAPSULE ORAL at 22:40

## 2021-09-07 RX ADMIN — ATORVASTATIN CALCIUM 40 MG: 40 TABLET, FILM COATED ORAL at 18:54

## 2021-09-07 NOTE — ED NOTES
Pt is a 49 y o  female who was brought to the ED with suicidal ideations since last night  Patient reports thoughts to cut her wrists and throat following a break-up with her boyfriend  Patient also feels stressed from life in general and feels like she gets picked on at home  Patient feels as though she does everything wrong and nobody is supportive of her  Patient reports ongoing suicidal ideations with prior attempt, last being in April via overdose  Patient denies homicidal ideations and visual hallucinations  She does report hearing her own voice that tells her to cut her wrists, throat, or walk in front of a car  Patient reports a decrease in appetite over the last 3-4 days and trouble with sleep because of frequently waking up  Patient has received inpatient treatment multiple times in the past, last discharged about 2 weeks ago from [facility]  Patient is active in outpatient treatment with [facility]  She meets with her therapist weekly and psychiatrist monthly  Patient reports recent medicaiton changes, but is unsure of what they all were  Patient continues to endorse suicidal ideations and does not feel as though she would be safe if discharged  Patient requesting to sign a 201      Chief Complaint   Patient presents with    Psychiatric Evaluation     patient presents to the ER with c/o SI and plan to slit her wrists, states she feels this way because she cant see her boyfriend      Intake Assessment completed, Safety risk Assessment completed    RORY Wolf  09/07/21 1938

## 2021-09-07 NOTE — ED PROVIDER NOTES
History  Chief Complaint   Patient presents with    Psychiatric Evaluation     patient presents to the ER with c/o SI and plan to slit her wrists, states she feels this way because she cant see her boyfriend       This is a 32-year-old female who presents via ambulance from [place] [address] for crisis evaluation suicidal ideation to cut her wrist since yesterday because of her life situation and not being able to be with her boyfriend      History provided by:  Patient  Medical Problem  Location:   generalized  Quality:   anxiety and depression with suicidal ideation to cut wrist  Severity:  Severe  Onset quality:  Gradual  Duration:  2 days  Timing:  Constant  Progression:  Waxing and waning  Chronicity:  New  Context:   anxiety depression and suicidal ideation  Relieved by:   nothing  Worsened by:   not seeing her boyfriend and living situation      Prior to Admission Medications   Prescriptions Last Dose Informant Patient Reported? Taking?    FLUoxetine (PROzac) 20 mg capsule   No No   Sig: Take 3 capsules (60 mg total) by mouth daily At 9am   Patient taking differently: Take 40 mg by mouth daily At 9am   OLANZapine (ZyPREXA) 20 MG tablet Not Taking at Unknown time  Yes No   Sig: Take 20 mg by mouth daily at bedtime   Patient not taking: Reported on 9/7/2021   acetaminophen (TYLENOL) 325 mg tablet   No No   Sig: Take 2 tablets (650 mg total) by mouth every 6 (six) hours as needed for mild pain   aspirin (ECOTRIN LOW STRENGTH) 81 mg EC tablet   No No   Sig: Take 1 tablet (81 mg total) by mouth daily At 9am   atorvastatin (LIPITOR) 40 mg tablet   Yes No   Sig: Take 40 mg by mouth daily   cloZAPine (CLOZARIL) 25 mg tablet Not Taking at Unknown time  Yes No   Sig: Take 12 5 mg by mouth daily at bedtime   Patient not taking: Reported on 9/7/2021   haloperidol (HALDOL) 5 mg tablet Not Taking at Unknown time  Yes No   Sig: Take 5 mg by mouth 2 (two) times a day   Patient not taking: Reported on 9/7/2021   lisinopril (ZESTRIL) 20 mg tablet   Yes No   Sig: Take 20 mg by mouth   melatonin 3 mg   Yes No   Sig: Take 3 mg by mouth daily at bedtime   pantoprazole (PROTONIX) 20 mg tablet   Yes No   Sig: Take 20 mg by mouth   prazosin (MINIPRESS) 1 mg capsule   No No   Sig: Take 1 capsule (1 mg total) by mouth daily at bedtime Take one capsule by mouth at bedtime for dreams / sleep disturbances  sucralfate (CARAFATE) 1 g/10 mL suspension Not Taking at Unknown time  No No   Sig: Take 10 mL (1 g total) by mouth 4 (four) times a day as needed (upset stomach)   Patient not taking: Reported on 9/7/2021   valproic acid (DEPAKENE) 250 mg capsule   Yes No   Sig: Take 500 mg by mouth 4 (four) times a day      Facility-Administered Medications: None       Past Medical History:   Diagnosis Date    Bipolar 1 disorder ([facility] )     Borderline personality disorder ([place] [address] )     CAD (coronary artery disease)     Dyslipidemia     GERD (gastroesophageal reflux disease)     Hypertension     Obesity     Psychiatric disorder     PTSD (post-traumatic stress disorder)     Schizoaffective disorder ([facility] )     Seizure ([facility] )        Past Surgical History:   Procedure Laterality Date    APPENDECTOMY      CHOLECYSTECTOMY      FOOT SURGERY Right        History reviewed  No pertinent family history  I have reviewed and agree with the history as documented  E-Cigarette/Vaping    E-Cigarette Use Never User      E-Cigarette/Vaping Substances    Nicotine No     THC No     CBD No     Flavoring No     Other No     Unknown No      Social History     Tobacco Use    Smoking status: Former Smoker    Smokeless tobacco: Never Used   Vaping Use    Vaping Use: Never used   Substance Use Topics    Alcohol use: Not Currently    Drug use: Not Currently       Review of Systems   Psychiatric/Behavioral: Positive for suicidal ideas  The patient is nervous/anxious  All other systems reviewed and are negative        Physical Exam  Physical Exam  Vitals and nursing note reviewed  Constitutional:       General: She is not in acute distress  Appearance: She is not ill-appearing, toxic-appearing or diaphoretic  HENT:      Head: Normocephalic and atraumatic  Right Ear: Tympanic membrane, ear canal and external ear normal       Left Ear: Tympanic membrane, ear canal and external ear normal       Nose: Nose normal    Eyes:      General: No scleral icterus  Right eye: No discharge  Left eye: No discharge  Extraocular Movements: Extraocular movements intact  Pupils: Pupils are equal, round, and reactive to light  Cardiovascular:      Rate and Rhythm: Normal rate and regular rhythm  Pulses: Normal pulses  Heart sounds: Normal heart sounds  No murmur heard  No friction rub  No gallop  Pulmonary:      Effort: Pulmonary effort is normal  No respiratory distress  Breath sounds: Normal breath sounds  No stridor  No wheezing, rhonchi or rales  Abdominal:      General: There is no distension  Palpations: Abdomen is soft  Tenderness: There is no abdominal tenderness  There is no guarding or rebound  Musculoskeletal:         General: No swelling, tenderness, deformity or signs of injury  Normal range of motion  Cervical back: Normal range of motion and neck supple  No rigidity or tenderness  Right lower leg: No edema  Left lower leg: No edema  Skin:     General: Skin is warm and dry  Coloration: Skin is not jaundiced  Findings: No bruising, erythema or rash  Neurological:      General: No focal deficit present  Mental Status: She is alert and oriented to person, place, and time  Cranial Nerves: No cranial nerve deficit  Sensory: No sensory deficit        Coordination: Coordination normal    Psychiatric:         Behavior: Behavior normal       Comments: Flat affect with suicidal ideation to cut her wrist         Vital Signs  ED Triage Vitals [09/07/21 1725]   Temperature Pulse Respirations Blood Pressure SpO2   98 1 °F (36 7 °C) 87 20 126/70 93 %      Temp Source Heart Rate Source Patient Position - Orthostatic VS BP Location FiO2 (%)   Temporal Monitor Sitting Right arm --      Pain Score       --           Vitals:    09/07/21 1725   BP: 126/70   Pulse: 87   Patient Position - Orthostatic VS: Sitting         Visual Acuity      ED Medications  Medications   aspirin chewable tablet 81 mg (has no administration in time range)   acetaminophen (TYLENOL) oral suspension 325 mg (has no administration in time range)   valproic acid (DEPAKENE) capsule 500 mg (500 mg Oral Not Given 9/7/21 1751)   prazosin (MINIPRESS) capsule 1 mg (has no administration in time range)   pantoprazole (PROTONIX) EC tablet 20 mg (has no administration in time range)   melatonin tablet 3 mg (has no administration in time range)   lisinopril (ZESTRIL) tablet 20 mg (has no administration in time range)   FLUoxetine (PROzac) capsule 40 mg (has no administration in time range)   cloZAPine (CLOZARIL) tablet 12 5 mg (has no administration in time range)   atorvastatin (LIPITOR) tablet 40 mg (40 mg Oral Given 9/7/21 1854)       Diagnostic Studies  Results Reviewed     Procedure Component Value Units Date/Time    Novel Coronavirus (Covid-19),PCR [facility] - 2 Hour Stat [[id number]]  (Normal) Collected: 09/07/21 1807    Lab Status: Final result Specimen: Nares from Nose Updated: 09/07/21 1945     SARS-CoV-2 Negative    Narrative: The specimen collection materials, transport medium, and/or testing methodology utilized in the production of these test results have been proven to be reliable in a limited validation with an abbreviated program under the Emergency Utilization Authorization provided by the FDA  Testing reported as "Presumptive positive" will be confirmed with secondary testing to ensure result accuracy    Clinical caution and judgement should be used with the interpretation of these results with consideration of the clinical impression and other laboratory testing  Testing reported as "Positive" or "Negative" has been proven to be accurate according to standard laboratory validation requirements  All testing is performed with control materials showing appropriate reactivity at standard intervals  TSH [[id number]]  (Normal) Collected: 09/07/21 1807    Lab Status: Final result Specimen: Blood from Arm, Right Updated: 09/07/21 1848     TSH 3RD GENERATON 2 494 uIU/mL     Narrative:      Patients undergoing fluorescein dye angiography may retain small amounts of fluorescein in the body for 48-72 hours post procedure  Samples containing fluorescein can produce falsely depressed TSH values  If the patient had this procedure,a specimen should be resubmitted post fluorescein clearance        Comprehensive metabolic panel [[id number]]  (Abnormal) Collected: 09/07/21 1807    Lab Status: Final result Specimen: Blood from Arm, Right Updated: 09/07/21 1839     Sodium 136 mmol/L      Potassium 4 4 mmol/L      Chloride 98 mmol/L      CO2 30 mmol/L      ANION GAP 8 mmol/L      BUN 17 mg/dL      Creatinine 0 79 mg/dL      Glucose 89 mg/dL      Calcium 9 0 mg/dL      AST 16 U/L      ALT 27 U/L      Alkaline Phosphatase 61 U/L      Total Protein 7 6 g/dL      Albumin 3 8 g/dL      Total Bilirubin 0 60 mg/dL      eGFR 88 ml/min/1 73sq m     Narrative:      [place] guidelines for Chronic Kidney Disease (CKD):     Stage 1 with normal or high GFR (GFR > 90 mL/min/1 73 square meters)    Stage 2 Mild CKD (GFR = 60-89 mL/min/1 73 square meters)    Stage 3A Moderate CKD (GFR = 45-59 mL/min/1 73 square meters)    Stage 3B Moderate CKD (GFR = 30-44 mL/min/1 73 square meters)    Stage 4 Severe CKD (GFR = 15-29 mL/min/1 73 square meters)    Stage 5 End Stage CKD (GFR <15 mL/min/1 73 square meters)  Note: GFR calculation is accurate only with a steady state creatinine    UA w Reflex to Microscopic w Reflex to Culture [[id number]]     Lab Status: No result Specimen: Urine     POCT pregnancy, urine [[id number]]     Lab Status: No result     Rapid drug screen, urine [[id number]]  (Normal) Collected: 09/07/21 1807    Lab Status: Final result Specimen: Urine, Clean Catch Updated: 09/07/21 1827     Amph/Meth UR Negative     Barbiturate Ur Negative     Benzodiazepine Urine Negative     Cocaine Urine Negative     Methadone Urine Negative     Opiate Urine Negative     PCP Ur Negative     THC Urine Negative     Oxycodone Urine Negative    Narrative:      FOR MEDICAL PURPOSES ONLY  IF CONFIRMATION NEEDED PLEASE CONTACT THE LAB WITHIN 5 DAYS      Drug Screen Cutoff Levels:  AMPHETAMINE/METHAMPHETAMINES  1000 ng/mL  BARBITURATES     200 ng/mL  BENZODIAZEPINES     200 ng/mL  COCAINE      300 ng/mL  METHADONE      300 ng/mL  OPIATES      300 ng/mL  PHENCYCLIDINE     25 ng/mL  THC       50 ng/mL  OXYCODONE      100 ng/mL    CBC and differential [[id number]]  (Abnormal) Collected: 09/07/21 1807    Lab Status: Final result Specimen: Blood from Arm, Right Updated: 09/07/21 1815     WBC 8 96 Thousand/uL      RBC 4 15 Million/uL      Hemoglobin 13 2 g/dL      Hematocrit 40 3 %      MCV 97 fL      MCH 31 8 pg      MCHC 32 8 g/dL      RDW 14 6 %      MPV 10 1 fL      Platelets 893 Thousands/uL      nRBC 0 /100 WBCs      Neutrophils Relative 58 %      Immat GRANS % 1 %      Lymphocytes Relative 24 %      Monocytes Relative 15 %      Eosinophils Relative 1 %      Basophils Relative 1 %      Neutrophils Absolute 5 28 Thousands/µL      Immature Grans Absolute 0 07 Thousand/uL      Lymphocytes Absolute 2 14 Thousands/µL      Monocytes Absolute 1 31 Thousand/µL      Eosinophils Absolute 0 11 Thousand/µL      Basophils Absolute 0 05 Thousands/µL     POCT alcohol breath test [[id number]]     Lab Status: No result                  No orders to display              Procedures  ECG 12 Lead Documentation Only    Date/Time: 9/7/2021 5:50 PM  Performed by: Tenisha Phipps Harper Adrian DO  Authorized by: Rehan River DO     ECG reviewed by me, the ED Provider: yes    Patient location:  ED  Rate:     ECG rate:  89  Rhythm:     Rhythm: sinus rhythm    Conduction:     Conduction: normal    T waves:     T waves: normal               ED Course                             SBIRT 22yo+      Most Recent Value   SBIRT (24 yo +)   In order to provide better care to our patients, we are screening all of our patients for alcohol and drug use  Would it be okay to ask you these screening questions? Yes Filed at: 09/07/2021 1743   Initial Alcohol Screen: US AUDIT-C    1  How often do you have a drink containing alcohol?  0 Filed at: 09/07/2021 1743   2  How many drinks containing alcohol do you have on a typical day you are drinking? 0 Filed at: 09/07/2021 1743   3a  Male UNDER 65: How often do you have five or more drinks on one occasion? 0 Filed at: 09/07/2021 1743   3b  FEMALE Any Age, or MALE 65+: How often do you have 4 or more drinks on one occassion? 0 Filed at: 09/07/2021 1743   Audit-C Score  0 Filed at: 09/07/2021 1743   DAT: How many times in the past year have you    Used an illegal drug or used a prescription medication for non-medical reasons?   Never Filed at: 09/07/2021 1743                    MDM  Number of Diagnoses or Management Options  Diagnosis management comments:  Patient presents with suicidal ideation medical clearance in progress       Amount and/or Complexity of Data Reviewed  Clinical lab tests: ordered        Disposition  Final diagnoses:   Depression   Suicidal ideation     Time reflects when diagnosis was documented in both MDM as applicable and the Disposition within this note     Time User Action Codes Description Comment    9/7/2021  5:50 PM Colt Fajardo [F32 9] Depression     9/7/2021  5:50 PM Sari Speaker Add [Z70 181] Suicidal ideation       ED Disposition     ED Disposition Condition Date/Time Comment    Transfer to [address] [place] [address] Sep 7, 2021 5:51 PM Gi Houston should be transferred out to ** behavior health bed search in progress* and has been medically cleared  MD Documentation      Most Recent Value   Patient Condition  The patient has been stabilized such that within reasonable medical probability, no material deterioration of the patient condition or the condition of the unborn child(courtney) is likely to result from the transfer   Reason for Transfer  Level of Care needed not available at this facility   Benefits of Transfer  Other benefits (Include comment)_______________________ Rafa Poll [facility] tx]   Risks of Transfer  Potential for delay in receiving treatment   Accepting Physician  Dr Areli Mata Name, [facility]    (Name & Tel number)  Rosalinda Morelos [facility]   Sending MD  Dr Donovan Must   Provider Certification  General risk, such as traffic hazards, adverse weather conditions, rough terrain or turbulence, possible failure of equipment (including vehicle or aircraft), or consequences of actions of persons outside the control of the transport personnel, Unanticipated needs of medical equipment and personnel during transport      RN Documentation      Most [facility] Name, [facility]    (Name & Tel number)  RORY Blackwood      Follow-up Information    None         Patient's Medications   Discharge Prescriptions    No medications on file     No discharge procedures on file      PDMP Review     None          ED Provider  Electronically Signed by           Jaqueline Nava DO  09/07/21 2145

## 2021-09-08 VITALS
SYSTOLIC BLOOD PRESSURE: 122 MMHG | RESPIRATION RATE: 17 BRPM | HEART RATE: 77 BPM | TEMPERATURE: 98.1 F | DIASTOLIC BLOOD PRESSURE: 59 MMHG | OXYGEN SATURATION: 97 %

## 2021-09-08 LAB
BILIRUB UR QL STRIP: NEGATIVE
CLARITY UR: CLEAR
COLOR UR: YELLOW
EXT PREG TEST URINE: NEGATIVE
EXT. CONTROL ED NAV: NORMAL
GLUCOSE UR STRIP-MCNC: NEGATIVE MG/DL
HGB UR QL STRIP.AUTO: NEGATIVE
KETONES UR STRIP-MCNC: ABNORMAL MG/DL
LEUKOCYTE ESTERASE UR QL STRIP: NEGATIVE
NITRITE UR QL STRIP: NEGATIVE
PH UR STRIP.AUTO: 6 [PH]
PROT UR STRIP-MCNC: NEGATIVE MG/DL
SP GR UR STRIP.AUTO: >=1.03 (ref 1–1.03)
UROBILINOGEN UR QL STRIP.AUTO: 2 E.U./DL

## 2021-09-08 PROCEDURE — 81003 URINALYSIS AUTO W/O SCOPE: CPT | Performed by: EMERGENCY MEDICINE

## 2021-09-08 RX ADMIN — VALPROIC ACID 500 MG: 250 CAPSULE, LIQUID FILLED ORAL at 08:13

## 2021-09-08 RX ADMIN — PANTOPRAZOLE SODIUM 20 MG: 20 TABLET, DELAYED RELEASE ORAL at 08:13

## 2021-09-08 RX ADMIN — FLUOXETINE 40 MG: 10 CAPSULE ORAL at 08:11

## 2021-09-08 RX ADMIN — LISINOPRIL 20 MG: 5 TABLET ORAL at 08:11

## 2021-09-08 RX ADMIN — VALPROIC ACID 500 MG: 250 CAPSULE, LIQUID FILLED ORAL at 11:43

## 2021-09-08 RX ADMIN — ASPIRIN 81 MG: 81 TABLET, CHEWABLE ORAL at 08:13

## 2021-09-08 NOTE — ED NOTES
Spoke with Sanam from [facility] and let her know that Leydi is being transferred to [facility] at [facility]  Sanam is Leydi's care coordinator        [address] [facility]  09/08/21 1037

## 2021-09-08 NOTE — ED CARE HANDOFF
Emergency Department Sign Out Note        Sign out and transfer of care from Dr Susi Magallanes  See Separate Emergency Department note  The patient, Stephanie Sosa, was evaluated by the previous provider for [facility]                                 ED Course as of Sep 08 1401   Wed Sep 08, 2021   0813 Patient care signed out from Dr Susi Magallanes  Patient p/w SI  201 signed  Accepted to inpt psych  EMTALA completed  Procedures  MDM    Disposition  Final diagnoses:   Depression   Suicidal ideation     Time reflects when diagnosis was documented in both MDM as applicable and the Disposition within this note     Time User Action Codes Description Comment    9/7/2021  5:50 PM Jodi Taveras Add [F32 9] Depression     9/7/2021  5:50 PM Jodi Taveras Add [E62 299] Suicidal ideation       ED Disposition     ED Disposition Condition Date/Time Comment    Transfer to [address] [place] [address] Sep 7, 2021  5:51 PM Stephanie Sosa should be transferred out to ** behavior health bed search in progress* and has been medically cleared          MD Documentation      Most Recent Value   Patient Condition  The patient has been stabilized such that within reasonable medical probability, no material deterioration of the patient condition or the condition of the unborn child(courtney) is likely to result from the transfer   Reason for Transfer  Level of Care needed not available at this facility   Benefits of Transfer  Other benefits (Include comment)_______________________ Len[address] tx]   Risks of Transfer  Potential for delay in receiving treatment   Accepting Physician  Dr Thompson Estimable Name, [address] [place] [address]    (Name & Tel number)  Avi Barber [facility]   Sending MD Dr Luis Whitlock   Provider Certification  General risk, such as traffic hazards, adverse weather conditions, rough terrain or turbulence, possible failure of equipment (including vehicle or aircraft), or consequences of actions of persons outside the control of the transport personnel, Unanticipated needs of medical equipment and personnel during transport      RN Documentation      Most [address] Name, [address] [place]    (Name & Tel number)  RORY Watson      Follow-up Information    None       Discharge Medication List as of 9/8/2021  1:27 PM      CONTINUE these medications which have NOT CHANGED    Details   acetaminophen (TYLENOL) 325 mg tablet Take 2 tablets (650 mg total) by mouth every 6 (six) hours as needed for mild pain, Starting Fri 10/16/2020, Print      aspirin (ECOTRIN LOW STRENGTH) 81 mg EC tablet Take 1 tablet (81 mg total) by mouth daily At 9am, Starting Mon 1/18/2021, No Print      atorvastatin (LIPITOR) 40 mg tablet Take 40 mg by mouth daily, Historical Med      cloZAPine (CLOZARIL) 25 mg tablet Take 12 5 mg by mouth daily at bedtime, Historical Med      FLUoxetine (PROzac) 20 mg capsule Take 3 capsules (60 mg total) by mouth daily At 9am, Starting Mon 1/18/2021, Normal      haloperidol (HALDOL) 5 mg tablet Take 5 mg by mouth 2 (two) times a day, Historical Med      lisinopril (ZESTRIL) 20 mg tablet Take 20 mg by mouth, Starting Wed 12/16/2020, Historical Med      melatonin 3 mg Take 3 mg by mouth daily at bedtime, Historical Med      OLANZapine (ZyPREXA) 20 MG tablet Take 20 mg by mouth daily at bedtime, Historical Med      pantoprazole (PROTONIX) 20 mg tablet Take 20 mg by mouth, Starting Tue 12/15/2020, Historical Med      prazosin (MINIPRESS) 1 mg capsule Take 1 capsule (1 mg total) by mouth daily at bedtime Take one capsule by mouth at bedtime for dreams / sleep disturbances  , Starting Mon 1/18/2021, Normal      sucralfate (CARAFATE) 1 g/10 mL suspension Take 10 mL (1 g total) by mouth 4 (four) times a day as needed (upset stomach), Starting Mon 8/30/2021, Normal      valproic acid (DEPAKENE) 250 mg capsule Take 500 mg by mouth 4 (four) times a day, Historical Med           No discharge procedures on file         ED Provider  Electronically Signed by     Perry Brown DO  09/08/21 0630

## 2021-09-08 NOTE — ED NOTES
Patient is accepted at [facility]  Patient is accepted by Dr Can per Rizwana Sales in Admissions  Transportation is arranged with [facility]  Transportation is scheduled for 1pm    Patient may go to the floor at anytime  Nurse report is not needed       Krys Aid, LSW  09/07/21   3024

## 2021-09-08 NOTE — EMTALA/ACUTE CARE TRANSFER
[facility] EMERGENCY DEPARTMENT  [address]  [facility] [address]  Dept: [phone]      EMTALA TRANSFER CONSENT    NAME Leydi Zapata                                         1970                              MRN [id number]    I have been informed of my rights regarding examination, treatment, and transfer   by Dr Carol Chávez DO    Benefits: Other benefits (Include comment)_______________________ (Inpt MH tx)    Risks: Potential for delay in receiving treatment      Consent for Transfer:  I acknowledge that my medical condition has been evaluated and explained to me by the emergency department physician or other qualified medical person and/or my attending physician, who has recommended that I be transferred to the service of  Accepting Physician: Dr Steven Leyva at [address] [facility] Name, [address] : Rani Kingo 1213  The above potential benefits of such transfer, the potential risks associated with such transfer, and the probable risks of not being transferred have been explained to me, and I fully understand them  The doctor has explained that, in my case, the benefits of transfer outweigh the risks  I agree to be transferred  I authorize the performance of emergency medical procedures and treatments upon me in both transit and upon arrival at the receiving facility  Additionally, I authorize the release of any and all medical records to the receiving facility and request they be transported with me, if possible  I understand that the safest mode of transportation during a medical emergency is an ambulance and that the Hospital advocates the use of this mode of transport  Risks of traveling to the receiving facility by car, including absence of medical control, life sustaining equipment, such as oxygen, and medical personnel has been explained to me and I fully understand them      (PRAKASH CORRECT BOX BELOW)  [  ]  I consent to the stated transfer and to be transported by ambulance/helicopter  [  ]  I consent to the stated transfer, but refuse transportation by ambulance and accept full responsibility for my transportation by car  I understand the risks of non-ambulance transfers and I exonerate the Hospital and its staff from any deterioration in my condition that results from this refusal     X___________________________________________    DATE  21  TIME________  Signature of patient or legally responsible individual signing on patient behalf           RELATIONSHIP TO PATIENT_________________________          Provider Certification    NAME Leydi Rosado                                         1970                              MRN [id number]    A medical screening exam was performed on the above named patient  Based on the examination:    Condition Necessitating Transfer The primary encounter diagnosis was Depression  A diagnosis of Suicidal ideation was also pertinent to this visit  Patient Condition: The patient has been stabilized such that within reasonable medical probability, no material deterioration of the patient condition or the condition of the unborn child(courtney) is likely to result from the transfer    Reason for Transfer: Level of Care needed not available at this facility    Transfer Requirements: [address]   · Space available and qualified personnel available for treatment as acknowledged by RORY Moon  · Agreed to accept transfer and to provide appropriate medical treatment as acknowledged by       Dr Can  · Appropriate medical records of the examination and treatment of the patient are provided at the time of transfer   [address], [address] _______  · Transfer will be performed by qualified personnel from    and appropriate transfer equipment as required, including the use of necessary and appropriate life support measures      Provider Certification: I have examined the patient and explained the following risks and benefits of being transferred/refusing transfer to the patient/family:  General risk, such as traffic hazards, adverse weather conditions, rough terrain or turbulence, possible failure of equipment (including vehicle or aircraft), or consequences of actions of persons outside the control of the transport personnel, Unanticipated needs of medical equipment and personnel during transport      Based on these reasonable risks and benefits to the patient and/or the unborn child(courtney), and based upon the information available at the time of the patients examination, I certify that the medical benefits reasonably to be expected from the provision of appropriate medical treatments at another medical facility outweigh the increasing risks, if any, to the individuals medical condition, and in the case of labor to the unborn child, from effecting the transfer      X____________________________________________ DATE 09/07/21        TIME_______      ORIGINAL - SEND TO MEDICAL RECORDS   COPY - SEND WITH PATIENT DURING TRANSFER

## 2021-09-08 NOTE — EMTALA/ACUTE CARE TRANSFER
[facility] EMERGENCY DEPARTMENT  [address] [facility] [address]  Dept: [phone]      EMTALA TRANSFER CONSENT    NAME Leydi Segura                                         1970                              MRN [id number]    I have been informed of my rights regarding examination, treatment, and transfer   by Dr Abigail Curran DO    Benefits: Other benefits (Include comment)_______________________ (Inpt MH tx)    Risks: Potential for delay in receiving treatment      Consent for Transfer:  I acknowledge that my medical condition has been evaluated and explained to me by the emergency department physician or other qualified medical person and/or my attending physician, who has recommended that I be transferred to the service of  Accepting Physician: Dr Fernando Don at [address] [facility] Name, [address] : Rani Marin 1213  The above potential benefits of such transfer, the potential risks associated with such transfer, and the probable risks of not being transferred have been explained to me, and I fully understand them  The doctor has explained that, in my case, the benefits of transfer outweigh the risks  I agree to be transferred  I authorize the performance of emergency medical procedures and treatments upon me in both transit and upon arrival at the receiving facility  Additionally, I authorize the release of any and all medical records to the receiving facility and request they be transported with me, if possible  I understand that the safest mode of transportation during a medical emergency is an ambulance and that the Hospital advocates the use of this mode of transport  Risks of traveling to the receiving facility by car, including absence of medical control, life sustaining equipment, such as oxygen, and medical personnel has been explained to me and I fully understand them      (PRAKASH CORRECT BOX BELOW)  [  ]  I consent to the stated transfer and to be transported by ambulance/helicopter  [  ]  I consent to the stated transfer, but refuse transportation by ambulance and accept full responsibility for my transportation by car  I understand the risks of non-ambulance transfers and I exonerate the Hospital and its staff from any deterioration in my condition that results from this refusal     X___________________________________________    DATE  21  TIME________  Signature of patient or legally responsible individual signing on patient behalf           RELATIONSHIP TO PATIENT_________________________          Provider Certification    NAME Leydi Ribeiro                                         1970                              MRN [id number]    A medical screening exam was performed on the above named patient  Based on the examination:    Condition Necessitating Transfer The primary encounter diagnosis was Depression  A diagnosis of Suicidal ideation was also pertinent to this visit  Patient Condition: The patient has been stabilized such that within reasonable medical probability, no material deterioration of the patient condition or the condition of the unborn child(courtney) is likely to result from the transfer    Reason for Transfer: Level of Care needed not available at this facility    Transfer Requirements: [address]   · Space available and qualified personnel available for treatment as acknowledged by RORY Wolf  · Agreed to accept transfer and to provide appropriate medical treatment as acknowledged by       Dr Scotty Wetzel  · Appropriate medical records of the examination and treatment of the patient are provided at the time of transfer   [address] _______  · Transfer will be performed by qualified personnel from    and appropriate transfer equipment as required, including the use of necessary and appropriate life support measures      Provider Certification: I have examined the patient and explained the following risks and benefits of being transferred/refusing transfer to the patient/family:  General risk, such as traffic hazards, adverse weather conditions, rough terrain or turbulence, possible failure of equipment (including vehicle or aircraft), or consequences of actions of persons outside the control of the transport personnel, Unanticipated needs of medical equipment and personnel during transport      Based on these reasonable risks and benefits to the patient and/or the unborn child(courtney), and based upon the information available at the time of the patients examination, I certify that the medical benefits reasonably to be expected from the provision of appropriate medical treatments at another medical facility outweigh the increasing risks, if any, to the individuals medical condition, and in the case of labor to the unborn child, from effecting the transfer      X____________________________________________ DATE 09/07/21        TIME_______      ORIGINAL - SEND TO MEDICAL RECORDS   COPY - SEND WITH PATIENT DURING TRANSFER

## 2021-09-08 NOTE — ED NOTES
Insurance Authorization for admission:   Phone call placed to [facility]  Phone number: [phone]  Spoke to [facility]      3 days approved  Level of care: Acute Inpt  (201)  Review on 09/10/2021  Authorization # to be obtained by accepting facility upon arrival         EVS (Eligibility Verification System) called - [phone]    Automated system indicates: Primary Medicare A&B, secondary [facility]  (Medicare days exhausted)    RORY Blackwood  09/07/21   2119

## 2021-09-08 NOTE — ED NOTES
Bed search initiated to following facilities:     [facility]: Spoke with Kira Patton, no current beds available  [facility]: No beds available  [facility]: Spoke with Taoalhaji Kd, possible bed available; chart faxed for review  [facility]: No beds available  [facility]: Spoke with Maryjane, no current beds available but can review for discharge; chart faxed for review  [facility]: Spoke with Jack Temple, no beds available  [facility]: Spoke with Clifford Alexander, no beds available  [facility]: Spoke with Rickey Bui, bed available; chart faxed for review       RORY Adame  09/07/21   2030

## 2021-09-09 LAB
ATRIAL RATE: 89 BPM
P AXIS: 23 DEGREES
PR INTERVAL: 198 MS
QRS AXIS: 50 DEGREES
QRSD INTERVAL: 80 MS
QT INTERVAL: 348 MS
QTC INTERVAL: 423 MS
T WAVE AXIS: 55 DEGREES
VENTRICULAR RATE: 89 BPM

## 2021-09-09 PROCEDURE — 93010 ELECTROCARDIOGRAM REPORT: CPT | Performed by: INTERNAL MEDICINE

## 2021-10-08 ENCOUNTER — HOSPITAL ENCOUNTER (EMERGENCY)
Facility: HOSPITAL | Age: 51
DRG: 885 | End: 2021-10-09
Attending: EMERGENCY MEDICINE | Admitting: EMERGENCY MEDICINE
Payer: MEDICARE

## 2021-10-08 DIAGNOSIS — M54.50 LUMBAR BACK PAIN: ICD-10-CM

## 2021-10-08 DIAGNOSIS — R45.851 SUICIDAL IDEATION: Primary | ICD-10-CM

## 2021-10-08 LAB
ALBUMIN SERPL BCP-MCNC: 3.6 G/DL (ref 3.5–5)
ALP SERPL-CCNC: 66 U/L (ref 46–116)
ALT SERPL W P-5'-P-CCNC: 22 U/L (ref 12–78)
AMPHETAMINES SERPL QL SCN: NEGATIVE
ANION GAP SERPL CALCULATED.3IONS-SCNC: 8 MMOL/L (ref 4–13)
ANISOCYTOSIS BLD QL SMEAR: PRESENT
APAP SERPL-MCNC: <2 UG/ML (ref 10–20)
AST SERPL W P-5'-P-CCNC: 11 U/L (ref 5–45)
BARBITURATES UR QL: NEGATIVE
BASOPHILS # BLD MANUAL: 0 THOUSAND/UL (ref 0–0.1)
BASOPHILS NFR MAR MANUAL: 0 % (ref 0–1)
BENZODIAZ UR QL: NEGATIVE
BILIRUB SERPL-MCNC: 0.4 MG/DL (ref 0.2–1)
BILIRUB UR QL STRIP: NEGATIVE
BUN SERPL-MCNC: 13 MG/DL (ref 5–25)
CALCIUM SERPL-MCNC: 8.6 MG/DL (ref 8.3–10.1)
CHLORIDE SERPL-SCNC: 101 MMOL/L (ref 100–108)
CLARITY UR: CLEAR
CO2 SERPL-SCNC: 29 MMOL/L (ref 21–32)
COCAINE UR QL: NEGATIVE
COLOR UR: YELLOW
CREAT SERPL-MCNC: 0.74 MG/DL (ref 0.6–1.3)
EOSINOPHIL # BLD MANUAL: 0.08 THOUSAND/UL (ref 0–0.4)
EOSINOPHIL NFR BLD MANUAL: 1 % (ref 0–6)
ERYTHROCYTE [DISTWIDTH] IN BLOOD BY AUTOMATED COUNT: 14.6 % (ref 11.6–15.1)
ETHANOL SERPL-MCNC: <3 MG/DL (ref 0–3)
GFR SERPL CREATININE-BSD FRML MDRD: 95 ML/MIN/1.73SQ M
GLUCOSE SERPL-MCNC: 90 MG/DL (ref 65–140)
GLUCOSE UR STRIP-MCNC: NEGATIVE MG/DL
HCT VFR BLD AUTO: 37.9 % (ref 34.8–46.1)
HGB BLD-MCNC: 12.3 G/DL (ref 11.5–15.4)
HGB UR QL STRIP.AUTO: NEGATIVE
KETONES UR STRIP-MCNC: NEGATIVE MG/DL
LEUKOCYTE ESTERASE UR QL STRIP: NEGATIVE
LYMPHOCYTES # BLD AUTO: 1.79 THOUSAND/UL (ref 0.6–4.47)
LYMPHOCYTES # BLD AUTO: 22 % (ref 14–44)
MCH RBC QN AUTO: 31.1 PG (ref 26.8–34.3)
MCHC RBC AUTO-ENTMCNC: 32.5 G/DL (ref 31.4–37.4)
MCV RBC AUTO: 96 FL (ref 82–98)
METHADONE UR QL: NEGATIVE
MONOCYTES # BLD AUTO: 0.73 THOUSAND/UL (ref 0–1.22)
MONOCYTES NFR BLD: 9 % (ref 4–12)
NEUTROPHILS # BLD MANUAL: 5.54 THOUSAND/UL (ref 1.85–7.62)
NEUTS SEG NFR BLD AUTO: 68 % (ref 43–75)
NITRITE UR QL STRIP: NEGATIVE
OPIATES UR QL SCN: NEGATIVE
OVALOCYTES BLD QL SMEAR: PRESENT
OXYCODONE+OXYMORPHONE UR QL SCN: NEGATIVE
PCP UR QL: NEGATIVE
PH UR STRIP.AUTO: 6.5 [PH]
PLATELET # BLD AUTO: 187 THOUSANDS/UL (ref 149–390)
PLATELET BLD QL SMEAR: ADEQUATE
PMV BLD AUTO: 9.5 FL (ref 8.9–12.7)
POTASSIUM SERPL-SCNC: 4.7 MMOL/L (ref 3.5–5.3)
PROT SERPL-MCNC: 7 G/DL (ref 6.4–8.2)
PROT UR STRIP-MCNC: NEGATIVE MG/DL
RBC # BLD AUTO: 3.95 MILLION/UL (ref 3.81–5.12)
RBC MORPH BLD: PRESENT
SALICYLATES SERPL-MCNC: <3 MG/DL (ref 3–20)
SARS-COV-2 RNA RESP QL NAA+PROBE: NEGATIVE
SODIUM SERPL-SCNC: 138 MMOL/L (ref 136–145)
SP GR UR STRIP.AUTO: 1.02 (ref 1–1.03)
THC UR QL: NEGATIVE
TSH SERPL DL<=0.05 MIU/L-ACNC: 3.34 UIU/ML (ref 0.36–3.74)
UROBILINOGEN UR QL STRIP.AUTO: 4 E.U./DL
WBC # BLD AUTO: 8.14 THOUSAND/UL (ref 4.31–10.16)

## 2021-10-08 PROCEDURE — U0005 INFEC AGEN DETEC AMPLI PROBE: HCPCS | Performed by: EMERGENCY MEDICINE

## 2021-10-08 PROCEDURE — 82077 ASSAY SPEC XCP UR&BREATH IA: CPT | Performed by: EMERGENCY MEDICINE

## 2021-10-08 PROCEDURE — 80053 COMPREHEN METABOLIC PANEL: CPT | Performed by: PSYCHIATRY & NEUROLOGY

## 2021-10-08 PROCEDURE — U0003 INFECTIOUS AGENT DETECTION BY NUCLEIC ACID (DNA OR RNA); SEVERE ACUTE RESPIRATORY SYNDROME CORONAVIRUS 2 (SARS-COV-2) (CORONAVIRUS DISEASE [COVID-19]), AMPLIFIED PROBE TECHNIQUE, MAKING USE OF HIGH THROUGHPUT TECHNOLOGIES AS DESCRIBED BY CMS-2020-01-R: HCPCS | Performed by: EMERGENCY MEDICINE

## 2021-10-08 PROCEDURE — 80053 COMPREHEN METABOLIC PANEL: CPT | Performed by: EMERGENCY MEDICINE

## 2021-10-08 PROCEDURE — 84443 ASSAY THYROID STIM HORMONE: CPT | Performed by: EMERGENCY MEDICINE

## 2021-10-08 PROCEDURE — 81003 URINALYSIS AUTO W/O SCOPE: CPT | Performed by: EMERGENCY MEDICINE

## 2021-10-08 PROCEDURE — 84703 CHORIONIC GONADOTROPIN ASSAY: CPT | Performed by: PSYCHIATRY & NEUROLOGY

## 2021-10-08 PROCEDURE — 93005 ELECTROCARDIOGRAM TRACING: CPT

## 2021-10-08 PROCEDURE — 80143 DRUG ASSAY ACETAMINOPHEN: CPT | Performed by: EMERGENCY MEDICINE

## 2021-10-08 PROCEDURE — 99285 EMERGENCY DEPT VISIT HI MDM: CPT | Performed by: EMERGENCY MEDICINE

## 2021-10-08 PROCEDURE — 80307 DRUG TEST PRSMV CHEM ANLYZR: CPT | Performed by: EMERGENCY MEDICINE

## 2021-10-08 PROCEDURE — 85027 COMPLETE CBC AUTOMATED: CPT | Performed by: EMERGENCY MEDICINE

## 2021-10-08 PROCEDURE — 36415 COLL VENOUS BLD VENIPUNCTURE: CPT | Performed by: EMERGENCY MEDICINE

## 2021-10-08 PROCEDURE — 84443 ASSAY THYROID STIM HORMONE: CPT | Performed by: PSYCHIATRY & NEUROLOGY

## 2021-10-08 PROCEDURE — 86592 SYPHILIS TEST NON-TREP QUAL: CPT | Performed by: PSYCHIATRY & NEUROLOGY

## 2021-10-08 PROCEDURE — 85007 BL SMEAR W/DIFF WBC COUNT: CPT | Performed by: EMERGENCY MEDICINE

## 2021-10-08 PROCEDURE — 80179 DRUG ASSAY SALICYLATE: CPT | Performed by: EMERGENCY MEDICINE

## 2021-10-08 RX ORDER — LIDOCAINE 50 MG/G
1 PATCH TOPICAL ONCE
Status: COMPLETED | OUTPATIENT
Start: 2021-10-08 | End: 2021-10-09

## 2021-10-08 RX ORDER — ACETAMINOPHEN 325 MG/1
975 TABLET ORAL ONCE
Status: COMPLETED | OUTPATIENT
Start: 2021-10-08 | End: 2021-10-08

## 2021-10-08 RX ADMIN — ACETAMINOPHEN 975 MG: 325 TABLET, FILM COATED ORAL at 17:33

## 2021-10-08 RX ADMIN — LIDOCAINE 5% 1 PATCH: 700 PATCH TOPICAL at 17:33

## 2021-10-09 ENCOUNTER — HOSPITAL ENCOUNTER (INPATIENT)
Facility: HOSPITAL | Age: 51
LOS: 6 days | Discharge: HOME | DRG: 885 | End: 2021-10-15
Attending: STUDENT IN AN ORGANIZED HEALTH CARE EDUCATION/TRAINING PROGRAM | Admitting: PSYCHIATRY & NEUROLOGY
Payer: MEDICARE

## 2021-10-09 VITALS
DIASTOLIC BLOOD PRESSURE: 73 MMHG | HEART RATE: 75 BPM | RESPIRATION RATE: 20 BRPM | TEMPERATURE: 97.3 F | OXYGEN SATURATION: 95 % | SYSTOLIC BLOOD PRESSURE: 136 MMHG

## 2021-10-09 DIAGNOSIS — K59.00 CONSTIPATION: ICD-10-CM

## 2021-10-09 DIAGNOSIS — K21.9 GERD (GASTROESOPHAGEAL REFLUX DISEASE): ICD-10-CM

## 2021-10-09 DIAGNOSIS — E78.5 HYPERLIPIDEMIA, UNSPECIFIED HYPERLIPIDEMIA TYPE: ICD-10-CM

## 2021-10-09 DIAGNOSIS — I10 ESSENTIAL HYPERTENSION: ICD-10-CM

## 2021-10-09 DIAGNOSIS — R45.851 SUICIDAL IDEATION: ICD-10-CM

## 2021-10-09 DIAGNOSIS — F31.4 BIPOLAR AFFECTIVE DISORDER, DEPRESSED, SEVERE (HCC): Primary | ICD-10-CM

## 2021-10-09 DIAGNOSIS — F43.10 PTSD (POST-TRAUMATIC STRESS DISORDER): ICD-10-CM

## 2021-10-09 LAB
ALBUMIN SERPL BCP-MCNC: 3.4 G/DL (ref 3.5–5)
ALP SERPL-CCNC: 64 U/L (ref 46–116)
ALT SERPL W P-5'-P-CCNC: 17 U/L (ref 12–78)
ANION GAP SERPL CALCULATED.3IONS-SCNC: 11 MMOL/L (ref 4–13)
AST SERPL W P-5'-P-CCNC: 5 U/L (ref 5–45)
ATRIAL RATE: 79 BPM
BILIRUB SERPL-MCNC: 0.5 MG/DL (ref 0.2–1)
BUN SERPL-MCNC: 14 MG/DL (ref 5–25)
CALCIUM ALBUM COR SERPL-MCNC: 9.3 MG/DL (ref 8.3–10.1)
CALCIUM SERPL-MCNC: 8.8 MG/DL (ref 8.3–10.1)
CHLORIDE SERPL-SCNC: 98 MMOL/L (ref 100–108)
CO2 SERPL-SCNC: 27 MMOL/L (ref 21–32)
CREAT SERPL-MCNC: 0.67 MG/DL (ref 0.6–1.3)
GFR SERPL CREATININE-BSD FRML MDRD: 103 ML/MIN/1.73SQ M
GLUCOSE SERPL-MCNC: 87 MG/DL (ref 65–140)
HCG SERPL QL: NEGATIVE
P AXIS: 35 DEGREES
POTASSIUM SERPL-SCNC: 4.5 MMOL/L (ref 3.5–5.3)
PR INTERVAL: 200 MS
PROT SERPL-MCNC: 7.3 G/DL (ref 6.4–8.2)
QRS AXIS: 54 DEGREES
QRSD INTERVAL: 82 MS
QT INTERVAL: 364 MS
QTC INTERVAL: 417 MS
SODIUM SERPL-SCNC: 136 MMOL/L (ref 136–145)
T WAVE AXIS: 53 DEGREES
TSH SERPL DL<=0.05 MIU/L-ACNC: 3.22 UIU/ML (ref 0.36–3.74)
VENTRICULAR RATE: 79 BPM

## 2021-10-09 PROCEDURE — 93010 ELECTROCARDIOGRAM REPORT: CPT | Performed by: INTERNAL MEDICINE

## 2021-10-09 RX ORDER — LIDOCAINE 50 MG/G
1 PATCH TOPICAL ONCE
Status: CANCELLED | OUTPATIENT
Start: 2021-10-09 | End: 2021-10-09

## 2021-10-09 RX ORDER — FLUOXETINE HYDROCHLORIDE 20 MG/1
40 CAPSULE ORAL DAILY
Status: DISCONTINUED | OUTPATIENT
Start: 2021-10-10 | End: 2021-10-10

## 2021-10-09 RX ORDER — BENZTROPINE MESYLATE 1 MG/1
1 TABLET ORAL
Status: DISCONTINUED | OUTPATIENT
Start: 2021-10-09 | End: 2021-10-15 | Stop reason: HOSPADM

## 2021-10-09 RX ORDER — LANOLIN ALCOHOL/MO/W.PET/CERES
3 CREAM (GRAM) TOPICAL
Status: CANCELLED | OUTPATIENT
Start: 2021-10-09

## 2021-10-09 RX ORDER — OLANZAPINE 2.5 MG/1
2.5 TABLET ORAL
Status: DISCONTINUED | OUTPATIENT
Start: 2021-10-09 | End: 2021-10-15 | Stop reason: HOSPADM

## 2021-10-09 RX ORDER — VALPROIC ACID 250 MG/1
500 CAPSULE, LIQUID FILLED ORAL ONCE
Status: COMPLETED | OUTPATIENT
Start: 2021-10-09 | End: 2021-10-09

## 2021-10-09 RX ORDER — LORAZEPAM 2 MG/ML
2 INJECTION INTRAMUSCULAR EVERY 6 HOURS PRN
Status: CANCELLED | OUTPATIENT
Start: 2021-10-09

## 2021-10-09 RX ORDER — FLUOXETINE 10 MG/1
40 CAPSULE ORAL ONCE
Status: COMPLETED | OUTPATIENT
Start: 2021-10-09 | End: 2021-10-09

## 2021-10-09 RX ORDER — ASPIRIN 81 MG/1
81 TABLET ORAL ONCE
Status: COMPLETED | OUTPATIENT
Start: 2021-10-09 | End: 2021-10-09

## 2021-10-09 RX ORDER — HYDROXYZINE HYDROCHLORIDE 25 MG/1
25 TABLET, FILM COATED ORAL
Status: CANCELLED | OUTPATIENT
Start: 2021-10-09

## 2021-10-09 RX ORDER — HYDROXYZINE HYDROCHLORIDE 25 MG/1
50 TABLET, FILM COATED ORAL
Status: CANCELLED | OUTPATIENT
Start: 2021-10-09

## 2021-10-09 RX ORDER — OLANZAPINE 2.5 MG/1
2.5 TABLET ORAL
Status: CANCELLED | OUTPATIENT
Start: 2021-10-09

## 2021-10-09 RX ORDER — LANOLIN ALCOHOL/MO/W.PET/CERES
3 CREAM (GRAM) TOPICAL
Status: DISCONTINUED | OUTPATIENT
Start: 2021-10-09 | End: 2021-10-09 | Stop reason: SDUPTHER

## 2021-10-09 RX ORDER — LISINOPRIL 5 MG/1
20 TABLET ORAL DAILY
Status: CANCELLED | OUTPATIENT
Start: 2021-10-09

## 2021-10-09 RX ORDER — LISINOPRIL 5 MG/1
20 TABLET ORAL ONCE
Status: COMPLETED | OUTPATIENT
Start: 2021-10-09 | End: 2021-10-09

## 2021-10-09 RX ORDER — PRAZOSIN HYDROCHLORIDE 1 MG/1
1 CAPSULE ORAL
Status: CANCELLED | OUTPATIENT
Start: 2021-10-09

## 2021-10-09 RX ORDER — PANTOPRAZOLE SODIUM 20 MG/1
20 TABLET, DELAYED RELEASE ORAL
Status: DISCONTINUED | OUTPATIENT
Start: 2021-10-09 | End: 2021-10-10

## 2021-10-09 RX ORDER — HYDROXYZINE HYDROCHLORIDE 25 MG/1
25 TABLET, FILM COATED ORAL
Status: DISCONTINUED | OUTPATIENT
Start: 2021-10-09 | End: 2021-10-15 | Stop reason: HOSPADM

## 2021-10-09 RX ORDER — ATORVASTATIN CALCIUM 40 MG/1
40 TABLET, FILM COATED ORAL
Status: CANCELLED | OUTPATIENT
Start: 2021-10-09

## 2021-10-09 RX ORDER — HALOPERIDOL 5 MG
5 TABLET ORAL 2 TIMES DAILY
Status: CANCELLED | OUTPATIENT
Start: 2021-10-09

## 2021-10-09 RX ORDER — BENZTROPINE MESYLATE 1 MG/ML
1 INJECTION INTRAMUSCULAR; INTRAVENOUS
Status: DISCONTINUED | OUTPATIENT
Start: 2021-10-09 | End: 2021-10-15 | Stop reason: HOSPADM

## 2021-10-09 RX ORDER — IBUPROFEN 400 MG/1
400 TABLET ORAL EVERY 6 HOURS PRN
Status: DISCONTINUED | OUTPATIENT
Start: 2021-10-09 | End: 2021-10-15 | Stop reason: HOSPADM

## 2021-10-09 RX ORDER — IBUPROFEN 600 MG/1
600 TABLET ORAL EVERY 8 HOURS PRN
Status: CANCELLED | OUTPATIENT
Start: 2021-10-09

## 2021-10-09 RX ORDER — PRAZOSIN HYDROCHLORIDE 1 MG/1
1 CAPSULE ORAL
Status: DISCONTINUED | OUTPATIENT
Start: 2021-10-09 | End: 2021-10-10

## 2021-10-09 RX ORDER — ACETAMINOPHEN 325 MG/1
650 TABLET ORAL EVERY 6 HOURS PRN
Status: CANCELLED | OUTPATIENT
Start: 2021-10-09

## 2021-10-09 RX ORDER — IBUPROFEN 400 MG/1
400 TABLET ORAL EVERY 6 HOURS PRN
Status: CANCELLED | OUTPATIENT
Start: 2021-10-09

## 2021-10-09 RX ORDER — OLANZAPINE 10 MG/1
5 INJECTION, POWDER, LYOPHILIZED, FOR SOLUTION INTRAMUSCULAR
Status: CANCELLED | OUTPATIENT
Start: 2021-10-09

## 2021-10-09 RX ORDER — BENZTROPINE MESYLATE 0.5 MG/1
1 TABLET ORAL
Status: CANCELLED | OUTPATIENT
Start: 2021-10-09

## 2021-10-09 RX ORDER — HYDROXYZINE 50 MG/1
100 TABLET, FILM COATED ORAL
Status: DISCONTINUED | OUTPATIENT
Start: 2021-10-09 | End: 2021-10-15 | Stop reason: HOSPADM

## 2021-10-09 RX ORDER — VALPROIC ACID 250 MG/1
500 CAPSULE, LIQUID FILLED ORAL 4 TIMES DAILY
Status: DISCONTINUED | OUTPATIENT
Start: 2021-10-09 | End: 2021-10-15 | Stop reason: HOSPADM

## 2021-10-09 RX ORDER — OLANZAPINE 5 MG/1
5 TABLET ORAL
Status: DISCONTINUED | OUTPATIENT
Start: 2021-10-09 | End: 2021-10-15 | Stop reason: HOSPADM

## 2021-10-09 RX ORDER — ATORVASTATIN CALCIUM 40 MG/1
40 TABLET, FILM COATED ORAL
Status: DISCONTINUED | OUTPATIENT
Start: 2021-10-09 | End: 2021-10-15 | Stop reason: HOSPADM

## 2021-10-09 RX ORDER — FLUOXETINE 10 MG/1
40 CAPSULE ORAL DAILY
Status: CANCELLED | OUTPATIENT
Start: 2021-10-09

## 2021-10-09 RX ORDER — OLANZAPINE 2.5 MG/1
5 TABLET ORAL
Status: CANCELLED | OUTPATIENT
Start: 2021-10-09

## 2021-10-09 RX ORDER — ACETAMINOPHEN 325 MG/1
650 TABLET ORAL EVERY 6 HOURS PRN
Status: DISCONTINUED | OUTPATIENT
Start: 2021-10-09 | End: 2021-10-15 | Stop reason: HOSPADM

## 2021-10-09 RX ORDER — PROPRANOLOL HYDROCHLORIDE 10 MG/1
10 TABLET ORAL EVERY 8 HOURS PRN
Status: DISCONTINUED | OUTPATIENT
Start: 2021-10-09 | End: 2021-10-15 | Stop reason: HOSPADM

## 2021-10-09 RX ORDER — LISINOPRIL 20 MG/1
20 TABLET ORAL DAILY
Status: DISCONTINUED | OUTPATIENT
Start: 2021-10-10 | End: 2021-10-15 | Stop reason: HOSPADM

## 2021-10-09 RX ORDER — DIVALPROEX SODIUM 500 MG/1
500 TABLET, EXTENDED RELEASE ORAL 4 TIMES DAILY
Status: CANCELLED | OUTPATIENT
Start: 2021-10-09

## 2021-10-09 RX ORDER — HYDROXYZINE 50 MG/1
50 TABLET, FILM COATED ORAL
Status: DISCONTINUED | OUTPATIENT
Start: 2021-10-09 | End: 2021-10-15 | Stop reason: HOSPADM

## 2021-10-09 RX ORDER — OLANZAPINE 10 MG/1
2.5 INJECTION, POWDER, LYOPHILIZED, FOR SOLUTION INTRAMUSCULAR
Status: CANCELLED | OUTPATIENT
Start: 2021-10-09

## 2021-10-09 RX ORDER — HALOPERIDOL 5 MG
5 TABLET ORAL ONCE
Status: COMPLETED | OUTPATIENT
Start: 2021-10-09 | End: 2021-10-09

## 2021-10-09 RX ORDER — LIDOCAINE 50 MG/G
1 PATCH TOPICAL ONCE
Status: DISCONTINUED | OUTPATIENT
Start: 2021-10-09 | End: 2021-10-15 | Stop reason: HOSPADM

## 2021-10-09 RX ORDER — DIPHENHYDRAMINE HYDROCHLORIDE 50 MG/ML
50 INJECTION INTRAMUSCULAR; INTRAVENOUS EVERY 6 HOURS PRN
Status: DISCONTINUED | OUTPATIENT
Start: 2021-10-09 | End: 2021-10-15 | Stop reason: HOSPADM

## 2021-10-09 RX ORDER — HYDROXYZINE HYDROCHLORIDE 25 MG/1
100 TABLET, FILM COATED ORAL
Status: CANCELLED | OUTPATIENT
Start: 2021-10-09

## 2021-10-09 RX ORDER — PROPRANOLOL HYDROCHLORIDE 20 MG/1
10 TABLET ORAL EVERY 8 HOURS PRN
Status: CANCELLED | OUTPATIENT
Start: 2021-10-09

## 2021-10-09 RX ORDER — OLANZAPINE 10 MG/1
2.5 INJECTION, POWDER, LYOPHILIZED, FOR SOLUTION INTRAMUSCULAR
Status: DISCONTINUED | OUTPATIENT
Start: 2021-10-09 | End: 2021-10-15 | Stop reason: HOSPADM

## 2021-10-09 RX ORDER — IBUPROFEN 600 MG/1
600 TABLET ORAL EVERY 8 HOURS PRN
Status: DISCONTINUED | OUTPATIENT
Start: 2021-10-09 | End: 2021-10-15 | Stop reason: HOSPADM

## 2021-10-09 RX ORDER — OLANZAPINE 10 MG/1
5 INJECTION, POWDER, LYOPHILIZED, FOR SOLUTION INTRAMUSCULAR
Status: DISCONTINUED | OUTPATIENT
Start: 2021-10-09 | End: 2021-10-15 | Stop reason: HOSPADM

## 2021-10-09 RX ORDER — BENZTROPINE MESYLATE 1 MG/ML
1 INJECTION INTRAMUSCULAR; INTRAVENOUS
Status: CANCELLED | OUTPATIENT
Start: 2021-10-09

## 2021-10-09 RX ORDER — LANOLIN ALCOHOL/MO/W.PET/CERES
3 CREAM (GRAM) TOPICAL
Status: DISCONTINUED | OUTPATIENT
Start: 2021-10-09 | End: 2021-10-10

## 2021-10-09 RX ORDER — LORAZEPAM 2 MG/ML
2 INJECTION INTRAMUSCULAR EVERY 6 HOURS PRN
Status: DISCONTINUED | OUTPATIENT
Start: 2021-10-09 | End: 2021-10-15 | Stop reason: HOSPADM

## 2021-10-09 RX ORDER — HALOPERIDOL 5 MG
5 TABLET ORAL 2 TIMES DAILY
Status: DISCONTINUED | OUTPATIENT
Start: 2021-10-09 | End: 2021-10-10

## 2021-10-09 RX ORDER — DIPHENHYDRAMINE HYDROCHLORIDE 50 MG/ML
50 INJECTION INTRAMUSCULAR; INTRAVENOUS EVERY 6 HOURS PRN
Status: CANCELLED | OUTPATIENT
Start: 2021-10-09

## 2021-10-09 RX ORDER — PANTOPRAZOLE SODIUM 20 MG/1
20 TABLET, DELAYED RELEASE ORAL
Status: CANCELLED | OUTPATIENT
Start: 2021-10-09

## 2021-10-09 RX ADMIN — ATORVASTATIN CALCIUM 40 MG: 40 TABLET, FILM COATED ORAL at 16:23

## 2021-10-09 RX ADMIN — LISINOPRIL 20 MG: 5 TABLET ORAL at 08:16

## 2021-10-09 RX ADMIN — Medication 3 MG: at 22:15

## 2021-10-09 RX ADMIN — FLUOXETINE 40 MG: 10 CAPSULE ORAL at 08:16

## 2021-10-09 RX ADMIN — HALOPERIDOL 5 MG: 5 TABLET ORAL at 08:20

## 2021-10-09 RX ADMIN — HALOPERIDOL 5 MG: 5 TABLET ORAL at 17:31

## 2021-10-09 RX ADMIN — VALPROIC ACID 500 MG: 250 CAPSULE, LIQUID FILLED ORAL at 22:15

## 2021-10-09 RX ADMIN — HYDROXYZINE HYDROCHLORIDE 100 MG: 50 TABLET, FILM COATED ORAL at 13:39

## 2021-10-09 RX ADMIN — PANTOPRAZOLE SODIUM 20 MG: 20 TABLET, DELAYED RELEASE ORAL at 13:39

## 2021-10-09 RX ADMIN — VALPROIC ACID 500 MG: 250 CAPSULE, LIQUID FILLED ORAL at 13:38

## 2021-10-09 RX ADMIN — VALPROIC ACID 500 MG: 250 CAPSULE, LIQUID FILLED ORAL at 17:31

## 2021-10-09 RX ADMIN — ASPIRIN 81 MG: 81 TABLET, DELAYED RELEASE ORAL at 08:16

## 2021-10-09 RX ADMIN — VALPROIC ACID 500 MG: 250 CAPSULE, LIQUID FILLED ORAL at 08:20

## 2021-10-09 RX ADMIN — PRAZOSIN HYDROCHLORIDE 1 MG: 1 CAPSULE ORAL at 22:15

## 2021-10-10 PROBLEM — F31.4 BIPOLAR AFFECTIVE DISORDER, DEPRESSED, SEVERE (HCC): Status: ACTIVE | Noted: 2021-10-10

## 2021-10-10 PROBLEM — F31.63 BIPOLAR 1 DISORDER, MIXED, SEVERE (HCC): Status: ACTIVE | Noted: 2021-10-10

## 2021-10-10 PROCEDURE — 99222 1ST HOSP IP/OBS MODERATE 55: CPT | Performed by: PHYSICIAN ASSISTANT

## 2021-10-10 PROCEDURE — 99222 1ST HOSP IP/OBS MODERATE 55: CPT | Performed by: PSYCHIATRY & NEUROLOGY

## 2021-10-10 RX ORDER — ASPIRIN 81 MG/1
81 TABLET ORAL DAILY
Status: DISCONTINUED | OUTPATIENT
Start: 2021-10-10 | End: 2021-10-15 | Stop reason: HOSPADM

## 2021-10-10 RX ORDER — BUPROPION HYDROCHLORIDE 300 MG/1
300 TABLET ORAL DAILY
Status: DISCONTINUED | OUTPATIENT
Start: 2021-10-10 | End: 2021-10-15 | Stop reason: HOSPADM

## 2021-10-10 RX ORDER — FLUPHENAZINE HYDROCHLORIDE 5 MG/1
5 TABLET ORAL
Status: ON HOLD | COMMUNITY
End: 2021-10-14 | Stop reason: SDUPTHER

## 2021-10-10 RX ORDER — PANTOPRAZOLE SODIUM 40 MG/1
40 TABLET, DELAYED RELEASE ORAL
Status: DISCONTINUED | OUTPATIENT
Start: 2021-10-11 | End: 2021-10-15 | Stop reason: HOSPADM

## 2021-10-10 RX ORDER — PRAZOSIN HYDROCHLORIDE 1 MG/1
2 CAPSULE ORAL
Status: DISCONTINUED | OUTPATIENT
Start: 2021-10-10 | End: 2021-10-15 | Stop reason: HOSPADM

## 2021-10-10 RX ORDER — POLYETHYLENE GLYCOL 3350 17 G/17G
17 POWDER, FOR SOLUTION ORAL DAILY
Status: DISCONTINUED | OUTPATIENT
Start: 2021-10-10 | End: 2021-10-15 | Stop reason: HOSPADM

## 2021-10-10 RX ORDER — FLUPHENAZINE HYDROCHLORIDE 10 MG/1
10 TABLET ORAL
Status: ON HOLD | COMMUNITY
End: 2021-10-14 | Stop reason: SDUPTHER

## 2021-10-10 RX ORDER — BUPROPION HYDROCHLORIDE 150 MG/1
150 TABLET, EXTENDED RELEASE ORAL 2 TIMES DAILY
COMMUNITY
End: 2021-10-15 | Stop reason: HOSPADM

## 2021-10-10 RX ORDER — POLYETHYLENE GLYCOL 3350 17 G/17G
17 POWDER, FOR SOLUTION ORAL DAILY
Status: ON HOLD | COMMUNITY
End: 2021-10-14 | Stop reason: SDUPTHER

## 2021-10-10 RX ORDER — FLUOXETINE HYDROCHLORIDE 20 MG/1
60 CAPSULE ORAL DAILY
Status: DISCONTINUED | OUTPATIENT
Start: 2021-10-11 | End: 2021-10-15 | Stop reason: HOSPADM

## 2021-10-10 RX ORDER — FLUPHENAZINE HYDROCHLORIDE 5 MG/1
10 TABLET ORAL
Status: DISCONTINUED | OUTPATIENT
Start: 2021-10-10 | End: 2021-10-14

## 2021-10-10 RX ORDER — FLUPHENAZINE HYDROCHLORIDE 5 MG/1
5 TABLET ORAL
Status: DISCONTINUED | OUTPATIENT
Start: 2021-10-10 | End: 2021-10-15 | Stop reason: HOSPADM

## 2021-10-10 RX ADMIN — ASPIRIN 81 MG: 81 TABLET, DELAYED RELEASE ORAL at 14:15

## 2021-10-10 RX ADMIN — OLANZAPINE 5 MG: 5 TABLET, FILM COATED ORAL at 11:08

## 2021-10-10 RX ADMIN — FLUOXETINE 40 MG: 20 CAPSULE ORAL at 09:03

## 2021-10-10 RX ADMIN — IBUPROFEN 600 MG: 600 TABLET ORAL at 14:16

## 2021-10-10 RX ADMIN — VALPROIC ACID 500 MG: 250 CAPSULE, LIQUID FILLED ORAL at 17:02

## 2021-10-10 RX ADMIN — ATORVASTATIN CALCIUM 40 MG: 40 TABLET, FILM COATED ORAL at 17:02

## 2021-10-10 RX ADMIN — BUPROPION HYDROCHLORIDE 300 MG: 300 TABLET, FILM COATED, EXTENDED RELEASE ORAL at 14:15

## 2021-10-10 RX ADMIN — VALPROIC ACID 500 MG: 250 CAPSULE, LIQUID FILLED ORAL at 12:06

## 2021-10-10 RX ADMIN — LISINOPRIL 20 MG: 20 TABLET ORAL at 09:03

## 2021-10-10 RX ADMIN — VALPROIC ACID 500 MG: 250 CAPSULE, LIQUID FILLED ORAL at 09:03

## 2021-10-10 RX ADMIN — FLUPHENAZINE HYDROCHLORIDE 5 MG: 5 TABLET, FILM COATED ORAL at 14:15

## 2021-10-10 RX ADMIN — VALPROIC ACID 500 MG: 250 CAPSULE, LIQUID FILLED ORAL at 21:34

## 2021-10-10 RX ADMIN — HALOPERIDOL 5 MG: 5 TABLET ORAL at 09:03

## 2021-10-10 RX ADMIN — HYDROXYZINE HYDROCHLORIDE 100 MG: 50 TABLET, FILM COATED ORAL at 11:08

## 2021-10-10 RX ADMIN — FLUPHENAZINE HYDROCHLORIDE 10 MG: 5 TABLET, FILM COATED ORAL at 21:34

## 2021-10-10 RX ADMIN — PANTOPRAZOLE SODIUM 20 MG: 20 TABLET, DELAYED RELEASE ORAL at 06:11

## 2021-10-11 LAB
ATRIAL RATE: 76 BPM
P AXIS: 59 DEGREES
PR INTERVAL: 202 MS
QRS AXIS: 39 DEGREES
QRSD INTERVAL: 88 MS
QT INTERVAL: 378 MS
QTC INTERVAL: 425 MS
RPR SER QL: NORMAL
T WAVE AXIS: 63 DEGREES
VENTRICULAR RATE: 76 BPM

## 2021-10-11 PROCEDURE — 93010 ELECTROCARDIOGRAM REPORT: CPT | Performed by: INTERNAL MEDICINE

## 2021-10-11 PROCEDURE — 93005 ELECTROCARDIOGRAM TRACING: CPT

## 2021-10-11 PROCEDURE — 99232 SBSQ HOSP IP/OBS MODERATE 35: CPT | Performed by: STUDENT IN AN ORGANIZED HEALTH CARE EDUCATION/TRAINING PROGRAM

## 2021-10-11 RX ADMIN — FLUPHENAZINE HYDROCHLORIDE 5 MG: 5 TABLET, FILM COATED ORAL at 06:12

## 2021-10-11 RX ADMIN — FLUPHENAZINE HYDROCHLORIDE 10 MG: 5 TABLET, FILM COATED ORAL at 21:19

## 2021-10-11 RX ADMIN — PRAZOSIN HYDROCHLORIDE 2 MG: 1 CAPSULE ORAL at 21:19

## 2021-10-11 RX ADMIN — POLYETHYLENE GLYCOL 3350 17 G: 17 POWDER, FOR SOLUTION ORAL at 09:34

## 2021-10-11 RX ADMIN — VALPROIC ACID 500 MG: 250 CAPSULE, LIQUID FILLED ORAL at 21:19

## 2021-10-11 RX ADMIN — PANTOPRAZOLE SODIUM 40 MG: 40 TABLET, DELAYED RELEASE ORAL at 06:12

## 2021-10-11 RX ADMIN — VALPROIC ACID 500 MG: 250 CAPSULE, LIQUID FILLED ORAL at 17:23

## 2021-10-11 RX ADMIN — ASPIRIN 81 MG: 81 TABLET, DELAYED RELEASE ORAL at 09:32

## 2021-10-11 RX ADMIN — FLUOXETINE 60 MG: 20 CAPSULE ORAL at 09:32

## 2021-10-11 RX ADMIN — BUPROPION HYDROCHLORIDE 300 MG: 300 TABLET, FILM COATED, EXTENDED RELEASE ORAL at 09:32

## 2021-10-11 RX ADMIN — LISINOPRIL 20 MG: 20 TABLET ORAL at 09:33

## 2021-10-11 RX ADMIN — VALPROIC ACID 500 MG: 250 CAPSULE, LIQUID FILLED ORAL at 12:16

## 2021-10-11 RX ADMIN — FLUPHENAZINE HYDROCHLORIDE 5 MG: 5 TABLET, FILM COATED ORAL at 12:16

## 2021-10-11 RX ADMIN — ATORVASTATIN CALCIUM 40 MG: 40 TABLET, FILM COATED ORAL at 17:23

## 2021-10-11 RX ADMIN — VALPROIC ACID 500 MG: 250 CAPSULE, LIQUID FILLED ORAL at 09:32

## 2021-10-12 PROCEDURE — 99232 SBSQ HOSP IP/OBS MODERATE 35: CPT | Performed by: STUDENT IN AN ORGANIZED HEALTH CARE EDUCATION/TRAINING PROGRAM

## 2021-10-12 RX ADMIN — VALPROIC ACID 500 MG: 250 CAPSULE, LIQUID FILLED ORAL at 17:09

## 2021-10-12 RX ADMIN — BUPROPION HYDROCHLORIDE 300 MG: 300 TABLET, FILM COATED, EXTENDED RELEASE ORAL at 08:46

## 2021-10-12 RX ADMIN — FLUPHENAZINE HYDROCHLORIDE 5 MG: 5 TABLET, FILM COATED ORAL at 06:14

## 2021-10-12 RX ADMIN — ASPIRIN 81 MG: 81 TABLET, DELAYED RELEASE ORAL at 08:46

## 2021-10-12 RX ADMIN — ATORVASTATIN CALCIUM 40 MG: 40 TABLET, FILM COATED ORAL at 16:19

## 2021-10-12 RX ADMIN — FLUOXETINE 60 MG: 20 CAPSULE ORAL at 08:46

## 2021-10-12 RX ADMIN — LISINOPRIL 20 MG: 20 TABLET ORAL at 08:46

## 2021-10-12 RX ADMIN — FLUPHENAZINE HYDROCHLORIDE 5 MG: 5 TABLET, FILM COATED ORAL at 12:19

## 2021-10-12 RX ADMIN — PANTOPRAZOLE SODIUM 40 MG: 40 TABLET, DELAYED RELEASE ORAL at 06:15

## 2021-10-12 RX ADMIN — VALPROIC ACID 500 MG: 250 CAPSULE, LIQUID FILLED ORAL at 12:19

## 2021-10-12 RX ADMIN — FLUPHENAZINE HYDROCHLORIDE 10 MG: 5 TABLET, FILM COATED ORAL at 21:31

## 2021-10-12 RX ADMIN — VALPROIC ACID 500 MG: 250 CAPSULE, LIQUID FILLED ORAL at 21:32

## 2021-10-13 LAB — VALPROATE SERPL-MCNC: 64 UG/ML (ref 50–100)

## 2021-10-13 PROCEDURE — 99232 SBSQ HOSP IP/OBS MODERATE 35: CPT | Performed by: STUDENT IN AN ORGANIZED HEALTH CARE EDUCATION/TRAINING PROGRAM

## 2021-10-13 PROCEDURE — 80164 ASSAY DIPROPYLACETIC ACD TOT: CPT | Performed by: STUDENT IN AN ORGANIZED HEALTH CARE EDUCATION/TRAINING PROGRAM

## 2021-10-13 RX ADMIN — FLUOXETINE 60 MG: 20 CAPSULE ORAL at 09:40

## 2021-10-13 RX ADMIN — LISINOPRIL 20 MG: 20 TABLET ORAL at 09:40

## 2021-10-13 RX ADMIN — VALPROIC ACID 500 MG: 250 CAPSULE, LIQUID FILLED ORAL at 12:30

## 2021-10-13 RX ADMIN — FLUPHENAZINE HYDROCHLORIDE 5 MG: 5 TABLET, FILM COATED ORAL at 12:30

## 2021-10-13 RX ADMIN — BUPROPION HYDROCHLORIDE 300 MG: 300 TABLET, FILM COATED, EXTENDED RELEASE ORAL at 09:40

## 2021-10-13 RX ADMIN — FLUPHENAZINE HYDROCHLORIDE 10 MG: 5 TABLET, FILM COATED ORAL at 20:55

## 2021-10-13 RX ADMIN — PRAZOSIN HYDROCHLORIDE 2 MG: 1 CAPSULE ORAL at 20:54

## 2021-10-13 RX ADMIN — ATORVASTATIN CALCIUM 40 MG: 40 TABLET, FILM COATED ORAL at 16:16

## 2021-10-13 RX ADMIN — VALPROIC ACID 500 MG: 250 CAPSULE, LIQUID FILLED ORAL at 20:55

## 2021-10-13 RX ADMIN — ASPIRIN 81 MG: 81 TABLET, DELAYED RELEASE ORAL at 09:40

## 2021-10-13 RX ADMIN — VALPROIC ACID 500 MG: 250 CAPSULE, LIQUID FILLED ORAL at 09:39

## 2021-10-13 RX ADMIN — VALPROIC ACID 500 MG: 250 CAPSULE, LIQUID FILLED ORAL at 18:24

## 2021-10-13 RX ADMIN — PANTOPRAZOLE SODIUM 40 MG: 40 TABLET, DELAYED RELEASE ORAL at 06:25

## 2021-10-13 RX ADMIN — FLUPHENAZINE HYDROCHLORIDE 5 MG: 5 TABLET, FILM COATED ORAL at 06:25

## 2021-10-14 PROBLEM — F39 MOOD DISORDER (HCC): Status: RESOLVED | Noted: 2020-11-29 | Resolved: 2021-10-14

## 2021-10-14 PROBLEM — Z00.8 MEDICAL CLEARANCE FOR PSYCHIATRIC ADMISSION: Status: RESOLVED | Noted: 2021-01-09 | Resolved: 2021-10-14

## 2021-10-14 LAB
FLUAV RNA RESP QL NAA+PROBE: NEGATIVE
FLUBV RNA RESP QL NAA+PROBE: NEGATIVE
RSV RNA RESP QL NAA+PROBE: NEGATIVE
SARS-COV-2 RNA RESP QL NAA+PROBE: NEGATIVE

## 2021-10-14 PROCEDURE — 99232 SBSQ HOSP IP/OBS MODERATE 35: CPT | Performed by: PHYSICIAN ASSISTANT

## 2021-10-14 PROCEDURE — 0241U HB NFCT DS VIR RESP RNA 4 TRGT: CPT | Performed by: PHYSICIAN ASSISTANT

## 2021-10-14 RX ORDER — ATORVASTATIN CALCIUM 40 MG/1
40 TABLET, FILM COATED ORAL
Qty: 30 TABLET | Refills: 1 | Status: ON HOLD | OUTPATIENT
Start: 2021-10-14 | End: 2021-11-22 | Stop reason: SDUPTHER

## 2021-10-14 RX ORDER — LISINOPRIL 20 MG/1
20 TABLET ORAL DAILY
Qty: 30 TABLET | Refills: 1 | Status: ON HOLD | OUTPATIENT
Start: 2021-10-15 | End: 2021-11-22 | Stop reason: SDUPTHER

## 2021-10-14 RX ORDER — BUPROPION HYDROCHLORIDE 300 MG/1
300 TABLET ORAL DAILY
Qty: 30 TABLET | Refills: 1 | Status: ON HOLD | OUTPATIENT
Start: 2021-10-15 | End: 2021-11-22 | Stop reason: SDUPTHER

## 2021-10-14 RX ORDER — FLUPHENAZINE HYDROCHLORIDE 5 MG/1
5 TABLET ORAL
Status: DISCONTINUED | OUTPATIENT
Start: 2021-10-14 | End: 2021-10-15 | Stop reason: HOSPADM

## 2021-10-14 RX ORDER — ASPIRIN 81 MG/1
81 TABLET ORAL DAILY
Qty: 30 TABLET | Refills: 1 | Status: SHIPPED | OUTPATIENT
Start: 2021-10-15 | End: 2021-11-23 | Stop reason: HOSPADM

## 2021-10-14 RX ORDER — PRAZOSIN HYDROCHLORIDE 2 MG/1
2 CAPSULE ORAL
Qty: 30 CAPSULE | Refills: 1 | Status: ON HOLD | OUTPATIENT
Start: 2021-10-14 | End: 2021-11-22 | Stop reason: SDUPTHER

## 2021-10-14 RX ORDER — FLUPHENAZINE HYDROCHLORIDE 5 MG/1
5 TABLET ORAL
Qty: 60 TABLET | Refills: 1 | Status: ON HOLD | OUTPATIENT
Start: 2021-10-14 | End: 2021-11-22 | Stop reason: SDUPTHER

## 2021-10-14 RX ORDER — FLUPHENAZINE HYDROCHLORIDE 10 MG/1
10 TABLET ORAL
Qty: 30 TABLET | Refills: 1 | Status: SHIPPED | OUTPATIENT
Start: 2021-10-14 | End: 2021-11-23 | Stop reason: HOSPADM

## 2021-10-14 RX ORDER — VALPROIC ACID 250 MG/1
500 CAPSULE, LIQUID FILLED ORAL 4 TIMES DAILY
Qty: 240 CAPSULE | Refills: 1 | Status: SHIPPED | OUTPATIENT
Start: 2021-10-14 | End: 2021-11-23 | Stop reason: HOSPADM

## 2021-10-14 RX ORDER — FLUOXETINE HYDROCHLORIDE 20 MG/1
60 CAPSULE ORAL DAILY
Qty: 90 CAPSULE | Refills: 1 | Status: SHIPPED | OUTPATIENT
Start: 2021-10-15 | End: 2021-11-23 | Stop reason: HOSPADM

## 2021-10-14 RX ORDER — PANTOPRAZOLE SODIUM 40 MG/1
40 TABLET, DELAYED RELEASE ORAL
Qty: 30 TABLET | Refills: 1 | Status: ON HOLD | OUTPATIENT
Start: 2021-10-15 | End: 2021-11-22 | Stop reason: SDUPTHER

## 2021-10-14 RX ORDER — POLYETHYLENE GLYCOL 3350 17 G/17G
17 POWDER, FOR SOLUTION ORAL DAILY
Qty: 510 G | Refills: 0 | Status: ON HOLD | OUTPATIENT
Start: 2021-10-14 | End: 2021-11-22

## 2021-10-14 RX ADMIN — VALPROIC ACID 500 MG: 250 CAPSULE, LIQUID FILLED ORAL at 12:02

## 2021-10-14 RX ADMIN — LISINOPRIL 20 MG: 20 TABLET ORAL at 09:22

## 2021-10-14 RX ADMIN — BUPROPION HYDROCHLORIDE 300 MG: 300 TABLET, FILM COATED, EXTENDED RELEASE ORAL at 09:21

## 2021-10-14 RX ADMIN — VALPROIC ACID 500 MG: 250 CAPSULE, LIQUID FILLED ORAL at 09:22

## 2021-10-14 RX ADMIN — FLUOXETINE 60 MG: 20 CAPSULE ORAL at 09:21

## 2021-10-14 RX ADMIN — VALPROIC ACID 500 MG: 250 CAPSULE, LIQUID FILLED ORAL at 21:39

## 2021-10-14 RX ADMIN — FLUPHENAZINE HYDROCHLORIDE 5 MG: 5 TABLET, FILM COATED ORAL at 09:22

## 2021-10-14 RX ADMIN — FLUPHENAZINE HYDROCHLORIDE 5 MG: 5 TABLET, FILM COATED ORAL at 12:03

## 2021-10-14 RX ADMIN — FLUPHENAZINE HYDROCHLORIDE 5 MG: 5 TABLET, FILM COATED ORAL at 21:38

## 2021-10-14 RX ADMIN — PANTOPRAZOLE SODIUM 40 MG: 40 TABLET, DELAYED RELEASE ORAL at 06:12

## 2021-10-14 RX ADMIN — FLUPHENAZINE HYDROCHLORIDE 5 MG: 5 TABLET, FILM COATED ORAL at 06:12

## 2021-10-14 RX ADMIN — HYDROXYZINE HYDROCHLORIDE 100 MG: 50 TABLET, FILM COATED ORAL at 15:33

## 2021-10-14 RX ADMIN — ATORVASTATIN CALCIUM 40 MG: 40 TABLET, FILM COATED ORAL at 15:33

## 2021-10-14 RX ADMIN — ASPIRIN 81 MG: 81 TABLET, DELAYED RELEASE ORAL at 09:22

## 2021-10-14 RX ADMIN — VALPROIC ACID 500 MG: 250 CAPSULE, LIQUID FILLED ORAL at 17:16

## 2021-10-15 VITALS
HEART RATE: 77 BPM | RESPIRATION RATE: 16 BRPM | OXYGEN SATURATION: 99 % | TEMPERATURE: 98.7 F | WEIGHT: 274.47 LBS | DIASTOLIC BLOOD PRESSURE: 59 MMHG | HEIGHT: 64 IN | SYSTOLIC BLOOD PRESSURE: 102 MMHG | BODY MASS INDEX: 46.86 KG/M2

## 2021-10-15 PROCEDURE — 99238 HOSP IP/OBS DSCHRG MGMT 30/<: CPT | Performed by: PHYSICIAN ASSISTANT

## 2021-10-15 RX ADMIN — VALPROIC ACID 500 MG: 250 CAPSULE, LIQUID FILLED ORAL at 09:24

## 2021-10-15 RX ADMIN — VALPROIC ACID 500 MG: 250 CAPSULE, LIQUID FILLED ORAL at 11:39

## 2021-10-15 RX ADMIN — ASPIRIN 81 MG: 81 TABLET, DELAYED RELEASE ORAL at 09:24

## 2021-10-15 RX ADMIN — FLUOXETINE 60 MG: 20 CAPSULE ORAL at 09:24

## 2021-10-15 RX ADMIN — PANTOPRAZOLE SODIUM 40 MG: 40 TABLET, DELAYED RELEASE ORAL at 06:22

## 2021-10-15 RX ADMIN — FLUPHENAZINE HYDROCHLORIDE 5 MG: 5 TABLET, FILM COATED ORAL at 11:39

## 2021-10-15 RX ADMIN — BUPROPION HYDROCHLORIDE 300 MG: 300 TABLET, FILM COATED, EXTENDED RELEASE ORAL at 09:24

## 2021-10-15 RX ADMIN — FLUPHENAZINE HYDROCHLORIDE 5 MG: 5 TABLET, FILM COATED ORAL at 06:22

## 2021-10-18 ENCOUNTER — APPOINTMENT (EMERGENCY)
Dept: RADIOLOGY | Facility: HOSPITAL | Age: 51
End: 2021-10-18
Payer: MEDICARE

## 2021-10-18 ENCOUNTER — HOSPITAL ENCOUNTER (EMERGENCY)
Facility: HOSPITAL | Age: 51
Discharge: HOME | End: 2021-10-18
Attending: EMERGENCY MEDICINE
Payer: MEDICARE

## 2021-10-18 VITALS
SYSTOLIC BLOOD PRESSURE: 113 MMHG | HEART RATE: 78 BPM | RESPIRATION RATE: 18 BRPM | WEIGHT: 275 LBS | OXYGEN SATURATION: 94 % | BODY MASS INDEX: 47.2 KG/M2 | TEMPERATURE: 97 F | DIASTOLIC BLOOD PRESSURE: 56 MMHG

## 2021-10-18 DIAGNOSIS — S92.355A NONDISPLACED FRACTURE OF FIFTH METATARSAL BONE, LEFT FOOT, INITIAL ENCOUNTER FOR CLOSED FRACTURE: Primary | ICD-10-CM

## 2021-10-18 PROCEDURE — 99283 EMERGENCY DEPT VISIT LOW MDM: CPT

## 2021-10-18 PROCEDURE — 73610 X-RAY EXAM OF ANKLE: CPT

## 2021-10-18 PROCEDURE — 99284 EMERGENCY DEPT VISIT MOD MDM: CPT | Performed by: PHYSICIAN ASSISTANT

## 2021-10-18 PROCEDURE — 29515 APPLICATION SHORT LEG SPLINT: CPT | Performed by: PHYSICIAN ASSISTANT

## 2021-10-26 ENCOUNTER — APPOINTMENT (OUTPATIENT)
Dept: RADIOLOGY | Facility: CLINIC | Age: 51
End: 2021-10-26
Payer: MEDICARE

## 2021-10-26 ENCOUNTER — OFFICE VISIT (OUTPATIENT)
Dept: OBGYN CLINIC | Facility: CLINIC | Age: 51
End: 2021-10-26
Payer: MEDICARE

## 2021-10-26 VITALS — BODY MASS INDEX: 47.2 KG/M2 | SYSTOLIC BLOOD PRESSURE: 138 MMHG | DIASTOLIC BLOOD PRESSURE: 82 MMHG | HEIGHT: 64 IN

## 2021-10-26 DIAGNOSIS — M79.672 PAIN IN LEFT FOOT: ICD-10-CM

## 2021-10-26 DIAGNOSIS — S92.355A NONDISPLACED FRACTURE OF FIFTH METATARSAL BONE, LEFT FOOT, INITIAL ENCOUNTER FOR CLOSED FRACTURE: Primary | ICD-10-CM

## 2021-10-26 PROCEDURE — 73630 X-RAY EXAM OF FOOT: CPT

## 2021-10-26 PROCEDURE — 99203 OFFICE O/P NEW LOW 30 MIN: CPT | Performed by: PHYSICIAN ASSISTANT

## 2021-11-02 ENCOUNTER — HOSPITAL ENCOUNTER (OUTPATIENT)
Dept: CT IMAGING | Facility: HOSPITAL | Age: 51
Discharge: HOME | End: 2021-11-02
Payer: MEDICARE

## 2021-11-02 DIAGNOSIS — M79.672 PAIN IN LEFT FOOT: ICD-10-CM

## 2021-11-02 DIAGNOSIS — S92.355A NONDISPLACED FRACTURE OF FIFTH METATARSAL BONE, LEFT FOOT, INITIAL ENCOUNTER FOR CLOSED FRACTURE: ICD-10-CM

## 2021-11-02 PROCEDURE — 73700 CT LOWER EXTREMITY W/O DYE: CPT

## 2021-11-05 ENCOUNTER — OFFICE VISIT (OUTPATIENT)
Dept: OBGYN CLINIC | Facility: CLINIC | Age: 51
End: 2021-11-05
Payer: MEDICARE

## 2021-11-05 VITALS — BODY MASS INDEX: 46.95 KG/M2 | WEIGHT: 275 LBS | HEIGHT: 64 IN

## 2021-11-05 DIAGNOSIS — S92.352A CLOSED DISPLACED FRACTURE OF FIFTH METATARSAL BONE OF LEFT FOOT, INITIAL ENCOUNTER: Primary | ICD-10-CM

## 2021-11-05 PROCEDURE — 99213 OFFICE O/P EST LOW 20 MIN: CPT | Performed by: ORTHOPAEDIC SURGERY

## 2021-11-05 RX ORDER — UREA 10 %
1 LOTION (ML) TOPICAL DAILY
Qty: 42 TABLET | Refills: 0 | Status: SHIPPED | OUTPATIENT
Start: 2021-11-05 | End: 2021-11-23 | Stop reason: HOSPADM

## 2021-11-05 RX ORDER — MELATONIN
4000 DAILY
Qty: 120 TABLET | Refills: 1 | Status: SHIPPED | OUTPATIENT
Start: 2021-11-05 | End: 2021-11-23 | Stop reason: HOSPADM

## 2021-11-12 ENCOUNTER — HOSPITAL ENCOUNTER (EMERGENCY)
Facility: HOSPITAL | Age: 51
DRG: 885 | End: 2021-11-13
Attending: EMERGENCY MEDICINE | Admitting: EMERGENCY MEDICINE
Payer: MEDICARE

## 2021-11-12 DIAGNOSIS — R45.851 DEPRESSION WITH SUICIDAL IDEATION: Primary | ICD-10-CM

## 2021-11-12 DIAGNOSIS — F31.4 BIPOLAR AFFECTIVE DISORDER, DEPRESSED, SEVERE (HCC): ICD-10-CM

## 2021-11-12 DIAGNOSIS — F32.A DEPRESSION WITH SUICIDAL IDEATION: Primary | ICD-10-CM

## 2021-11-12 LAB
ALBUMIN SERPL BCP-MCNC: 3.5 G/DL (ref 3.5–5)
ALP SERPL-CCNC: 63 U/L (ref 46–116)
ALT SERPL W P-5'-P-CCNC: 23 U/L (ref 12–78)
AMPHETAMINES SERPL QL SCN: POSITIVE
ANION GAP SERPL CALCULATED.3IONS-SCNC: 8 MMOL/L (ref 4–13)
AST SERPL W P-5'-P-CCNC: 11 U/L (ref 5–45)
BARBITURATES UR QL: NEGATIVE
BASOPHILS # BLD AUTO: 0.05 THOUSANDS/ΜL (ref 0–0.1)
BASOPHILS NFR BLD AUTO: 1 % (ref 0–1)
BENZODIAZ UR QL: NEGATIVE
BILIRUB SERPL-MCNC: 0.5 MG/DL (ref 0.2–1)
BUN SERPL-MCNC: 16 MG/DL (ref 5–25)
CALCIUM SERPL-MCNC: 8.8 MG/DL (ref 8.3–10.1)
CHLORIDE SERPL-SCNC: 100 MMOL/L (ref 100–108)
CO2 SERPL-SCNC: 29 MMOL/L (ref 21–32)
COCAINE UR QL: NEGATIVE
CREAT SERPL-MCNC: 0.67 MG/DL (ref 0.6–1.3)
EOSINOPHIL # BLD AUTO: 0.14 THOUSAND/ΜL (ref 0–0.61)
EOSINOPHIL NFR BLD AUTO: 2 % (ref 0–6)
ERYTHROCYTE [DISTWIDTH] IN BLOOD BY AUTOMATED COUNT: 14.6 % (ref 11.6–15.1)
ETHANOL EXG-MCNC: 0 MG/DL
FLUAV RNA RESP QL NAA+PROBE: NEGATIVE
FLUBV RNA RESP QL NAA+PROBE: NEGATIVE
GFR SERPL CREATININE-BSD FRML MDRD: 103 ML/MIN/1.73SQ M
GLUCOSE SERPL-MCNC: 65 MG/DL (ref 65–140)
HCT VFR BLD AUTO: 41.3 % (ref 34.8–46.1)
HGB BLD-MCNC: 13.6 G/DL (ref 11.5–15.4)
IMM GRANULOCYTES # BLD AUTO: 0.11 THOUSAND/UL (ref 0–0.2)
IMM GRANULOCYTES NFR BLD AUTO: 1 % (ref 0–2)
LYMPHOCYTES # BLD AUTO: 1.72 THOUSANDS/ΜL (ref 0.6–4.47)
LYMPHOCYTES NFR BLD AUTO: 21 % (ref 14–44)
MCH RBC QN AUTO: 31.7 PG (ref 26.8–34.3)
MCHC RBC AUTO-ENTMCNC: 32.9 G/DL (ref 31.4–37.4)
MCV RBC AUTO: 96 FL (ref 82–98)
METHADONE UR QL: NEGATIVE
MONOCYTES # BLD AUTO: 1.26 THOUSAND/ΜL (ref 0.17–1.22)
MONOCYTES NFR BLD AUTO: 15 % (ref 4–12)
NEUTROPHILS # BLD AUTO: 5.1 THOUSANDS/ΜL (ref 1.85–7.62)
NEUTS SEG NFR BLD AUTO: 60 % (ref 43–75)
NRBC BLD AUTO-RTO: 0 /100 WBCS
OPIATES UR QL SCN: NEGATIVE
OXYCODONE+OXYMORPHONE UR QL SCN: NEGATIVE
PCP UR QL: NEGATIVE
PLATELET # BLD AUTO: 189 THOUSANDS/UL (ref 149–390)
PMV BLD AUTO: 9.3 FL (ref 8.9–12.7)
POTASSIUM SERPL-SCNC: 4 MMOL/L (ref 3.5–5.3)
PROT SERPL-MCNC: 7.6 G/DL (ref 6.4–8.2)
RBC # BLD AUTO: 4.29 MILLION/UL (ref 3.81–5.12)
RSV RNA RESP QL NAA+PROBE: NEGATIVE
SARS-COV-2 RNA RESP QL NAA+PROBE: NEGATIVE
SODIUM SERPL-SCNC: 137 MMOL/L (ref 136–145)
THC UR QL: NEGATIVE
VALPROATE SERPL-MCNC: 83 UG/ML (ref 50–100)
WBC # BLD AUTO: 8.38 THOUSAND/UL (ref 4.31–10.16)

## 2021-11-12 PROCEDURE — 85025 COMPLETE CBC W/AUTO DIFF WBC: CPT | Performed by: PHYSICIAN ASSISTANT

## 2021-11-12 PROCEDURE — 0241U HB NFCT DS VIR RESP RNA 4 TRGT: CPT | Performed by: PHYSICIAN ASSISTANT

## 2021-11-12 PROCEDURE — 82075 ASSAY OF BREATH ETHANOL: CPT | Performed by: PHYSICIAN ASSISTANT

## 2021-11-12 PROCEDURE — 80307 DRUG TEST PRSMV CHEM ANLYZR: CPT | Performed by: PHYSICIAN ASSISTANT

## 2021-11-12 PROCEDURE — 80053 COMPREHEN METABOLIC PANEL: CPT | Performed by: PHYSICIAN ASSISTANT

## 2021-11-12 PROCEDURE — 36415 COLL VENOUS BLD VENIPUNCTURE: CPT | Performed by: PHYSICIAN ASSISTANT

## 2021-11-12 PROCEDURE — 99285 EMERGENCY DEPT VISIT HI MDM: CPT

## 2021-11-12 PROCEDURE — 99285 EMERGENCY DEPT VISIT HI MDM: CPT | Performed by: PHYSICIAN ASSISTANT

## 2021-11-12 PROCEDURE — 80164 ASSAY DIPROPYLACETIC ACD TOT: CPT | Performed by: PHYSICIAN ASSISTANT

## 2021-11-12 RX ORDER — LISINOPRIL 5 MG/1
20 TABLET ORAL DAILY
Status: DISCONTINUED | OUTPATIENT
Start: 2021-11-13 | End: 2021-11-13 | Stop reason: HOSPADM

## 2021-11-12 RX ORDER — BUPROPION HYDROCHLORIDE 300 MG/1
300 TABLET ORAL DAILY
Status: DISCONTINUED | OUTPATIENT
Start: 2021-11-13 | End: 2021-11-13 | Stop reason: HOSPADM

## 2021-11-12 RX ORDER — ASPIRIN 81 MG/1
81 TABLET, CHEWABLE ORAL DAILY
Status: DISCONTINUED | OUTPATIENT
Start: 2021-11-13 | End: 2021-11-13 | Stop reason: HOSPADM

## 2021-11-12 RX ORDER — PANTOPRAZOLE SODIUM 40 MG/1
40 TABLET, DELAYED RELEASE ORAL
Status: DISCONTINUED | OUTPATIENT
Start: 2021-11-13 | End: 2021-11-13 | Stop reason: HOSPADM

## 2021-11-12 RX ORDER — CALCIUM CARBONATE 500(1250)
1 TABLET ORAL
Status: DISCONTINUED | OUTPATIENT
Start: 2021-11-13 | End: 2021-11-13 | Stop reason: HOSPADM

## 2021-11-12 RX ORDER — FLUOXETINE HYDROCHLORIDE 20 MG/1
60 CAPSULE ORAL DAILY
Status: DISCONTINUED | OUTPATIENT
Start: 2021-11-13 | End: 2021-11-13 | Stop reason: HOSPADM

## 2021-11-12 RX ORDER — FLUPHENAZINE HYDROCHLORIDE 5 MG/1
10 TABLET ORAL
Status: DISCONTINUED | OUTPATIENT
Start: 2021-11-12 | End: 2021-11-13 | Stop reason: HOSPADM

## 2021-11-12 RX ORDER — VALPROIC ACID 250 MG/1
500 CAPSULE, LIQUID FILLED ORAL EVERY 6 HOURS SCHEDULED
Status: DISCONTINUED | OUTPATIENT
Start: 2021-11-12 | End: 2021-11-13 | Stop reason: HOSPADM

## 2021-11-12 RX ORDER — ATORVASTATIN CALCIUM 40 MG/1
40 TABLET, FILM COATED ORAL
Status: DISCONTINUED | OUTPATIENT
Start: 2021-11-12 | End: 2021-11-13 | Stop reason: HOSPADM

## 2021-11-12 RX ORDER — POLYETHYLENE GLYCOL 3350 17 G/17G
17 POWDER, FOR SOLUTION ORAL DAILY
Status: DISCONTINUED | OUTPATIENT
Start: 2021-11-13 | End: 2021-11-13 | Stop reason: HOSPADM

## 2021-11-12 RX ORDER — MELATONIN
1000 DAILY
Status: DISCONTINUED | OUTPATIENT
Start: 2021-11-13 | End: 2021-11-13 | Stop reason: HOSPADM

## 2021-11-12 RX ORDER — PRAZOSIN HYDROCHLORIDE 1 MG/1
2 CAPSULE ORAL
Status: DISCONTINUED | OUTPATIENT
Start: 2021-11-12 | End: 2021-11-13 | Stop reason: HOSPADM

## 2021-11-12 RX ADMIN — FLUPHENAZINE HYDROCHLORIDE 10 MG: 5 TABLET, FILM COATED ORAL at 22:44

## 2021-11-12 RX ADMIN — ATORVASTATIN CALCIUM 40 MG: 40 TABLET, FILM COATED ORAL at 19:25

## 2021-11-12 RX ADMIN — PRAZOSIN HYDROCHLORIDE 2 MG: 1 CAPSULE ORAL at 22:44

## 2021-11-12 RX ADMIN — VALPROIC ACID 500 MG: 250 CAPSULE, LIQUID FILLED ORAL at 19:25

## 2021-11-13 ENCOUNTER — HOSPITAL ENCOUNTER (INPATIENT)
Facility: HOSPITAL | Age: 51
LOS: 10 days | Discharge: HOME | DRG: 885 | End: 2021-11-23
Attending: STUDENT IN AN ORGANIZED HEALTH CARE EDUCATION/TRAINING PROGRAM | Admitting: PSYCHIATRY & NEUROLOGY
Payer: MEDICARE

## 2021-11-13 VITALS
SYSTOLIC BLOOD PRESSURE: 127 MMHG | HEART RATE: 77 BPM | DIASTOLIC BLOOD PRESSURE: 68 MMHG | TEMPERATURE: 97.8 F | OXYGEN SATURATION: 95 % | RESPIRATION RATE: 16 BRPM

## 2021-11-13 DIAGNOSIS — I10 ESSENTIAL HYPERTENSION: ICD-10-CM

## 2021-11-13 DIAGNOSIS — F31.4 BIPOLAR AFFECTIVE DISORDER, DEPRESSED, SEVERE (HCC): Primary | ICD-10-CM

## 2021-11-13 DIAGNOSIS — K21.9 GERD (GASTROESOPHAGEAL REFLUX DISEASE): ICD-10-CM

## 2021-11-13 DIAGNOSIS — B37.2 CUTANEOUS CANDIDIASIS: ICD-10-CM

## 2021-11-13 DIAGNOSIS — E78.5 HYPERLIPIDEMIA, UNSPECIFIED HYPERLIPIDEMIA TYPE: ICD-10-CM

## 2021-11-13 DIAGNOSIS — F43.10 PTSD (POST-TRAUMATIC STRESS DISORDER): ICD-10-CM

## 2021-11-13 DIAGNOSIS — K59.00 CONSTIPATION: ICD-10-CM

## 2021-11-13 DIAGNOSIS — E55.9 VITAMIN D DEFICIENCY: ICD-10-CM

## 2021-11-13 RX ORDER — LORAZEPAM 2 MG/ML
2 INJECTION INTRAMUSCULAR
Status: DISCONTINUED | OUTPATIENT
Start: 2021-11-13 | End: 2021-11-23 | Stop reason: HOSPADM

## 2021-11-13 RX ORDER — POLYETHYLENE GLYCOL 3350 17 G/17G
17 POWDER, FOR SOLUTION ORAL DAILY
Status: CANCELLED | OUTPATIENT
Start: 2021-11-13

## 2021-11-13 RX ORDER — ACETAMINOPHEN 325 MG/1
650 TABLET ORAL EVERY 4 HOURS PRN
Status: CANCELLED | OUTPATIENT
Start: 2021-11-13

## 2021-11-13 RX ORDER — BENZTROPINE MESYLATE 1 MG/ML
1 INJECTION INTRAMUSCULAR; INTRAVENOUS
Status: CANCELLED | OUTPATIENT
Start: 2021-11-13

## 2021-11-13 RX ORDER — ATORVASTATIN CALCIUM 40 MG/1
40 TABLET, FILM COATED ORAL
Status: DISCONTINUED | OUTPATIENT
Start: 2021-11-14 | End: 2021-11-23 | Stop reason: HOSPADM

## 2021-11-13 RX ORDER — AMOXICILLIN 250 MG
1 CAPSULE ORAL DAILY PRN
Status: DISCONTINUED | OUTPATIENT
Start: 2021-11-13 | End: 2021-11-23 | Stop reason: HOSPADM

## 2021-11-13 RX ORDER — LORAZEPAM 1 MG/1
1 TABLET ORAL
Status: CANCELLED | OUTPATIENT
Start: 2021-11-13

## 2021-11-13 RX ORDER — PRAZOSIN HYDROCHLORIDE 1 MG/1
2 CAPSULE ORAL
Status: CANCELLED | OUTPATIENT
Start: 2021-11-13

## 2021-11-13 RX ORDER — AMOXICILLIN 250 MG
1 CAPSULE ORAL DAILY PRN
Status: CANCELLED | OUTPATIENT
Start: 2021-11-13

## 2021-11-13 RX ORDER — LORAZEPAM 1 MG/1
1 TABLET ORAL
Status: DISCONTINUED | OUTPATIENT
Start: 2021-11-13 | End: 2021-11-23 | Stop reason: HOSPADM

## 2021-11-13 RX ORDER — LORAZEPAM 2 MG/ML
1 INJECTION INTRAMUSCULAR EVERY 4 HOURS PRN
Status: DISCONTINUED | OUTPATIENT
Start: 2021-11-13 | End: 2021-11-23 | Stop reason: HOSPADM

## 2021-11-13 RX ORDER — ACETAMINOPHEN 325 MG/1
975 TABLET ORAL EVERY 6 HOURS PRN
Status: CANCELLED | OUTPATIENT
Start: 2021-11-13

## 2021-11-13 RX ORDER — BENZTROPINE MESYLATE 1 MG/ML
1 INJECTION INTRAMUSCULAR; INTRAVENOUS
Status: DISCONTINUED | OUTPATIENT
Start: 2021-11-13 | End: 2021-11-23 | Stop reason: HOSPADM

## 2021-11-13 RX ORDER — FLUOXETINE HYDROCHLORIDE 20 MG/1
60 CAPSULE ORAL DAILY
Status: DISCONTINUED | OUTPATIENT
Start: 2021-11-14 | End: 2021-11-17

## 2021-11-13 RX ORDER — ACETAMINOPHEN 325 MG/1
975 TABLET ORAL EVERY 6 HOURS PRN
Status: DISCONTINUED | OUTPATIENT
Start: 2021-11-13 | End: 2021-11-15

## 2021-11-13 RX ORDER — RISPERIDONE 1 MG/1
1 TABLET, ORALLY DISINTEGRATING ORAL
Status: DISCONTINUED | OUTPATIENT
Start: 2021-11-13 | End: 2021-11-23 | Stop reason: HOSPADM

## 2021-11-13 RX ORDER — ACETAMINOPHEN 325 MG/1
650 TABLET ORAL EVERY 6 HOURS PRN
Status: DISCONTINUED | OUTPATIENT
Start: 2021-11-13 | End: 2021-11-23 | Stop reason: HOSPADM

## 2021-11-13 RX ORDER — PRAZOSIN HYDROCHLORIDE 1 MG/1
2 CAPSULE ORAL
Status: DISCONTINUED | OUTPATIENT
Start: 2021-11-13 | End: 2021-11-23 | Stop reason: HOSPADM

## 2021-11-13 RX ORDER — HALOPERIDOL 5 MG/ML
2.5 INJECTION INTRAMUSCULAR
Status: DISCONTINUED | OUTPATIENT
Start: 2021-11-13 | End: 2021-11-23 | Stop reason: HOSPADM

## 2021-11-13 RX ORDER — CALCIUM CARBONATE 500(1250)
1 TABLET ORAL
Status: CANCELLED | OUTPATIENT
Start: 2021-11-14

## 2021-11-13 RX ORDER — ACETAMINOPHEN 325 MG/1
650 TABLET ORAL EVERY 4 HOURS PRN
Status: DISCONTINUED | OUTPATIENT
Start: 2021-11-13 | End: 2021-11-23 | Stop reason: HOSPADM

## 2021-11-13 RX ORDER — HALOPERIDOL 5 MG/ML
5 INJECTION INTRAMUSCULAR
Status: DISCONTINUED | OUTPATIENT
Start: 2021-11-13 | End: 2021-11-23 | Stop reason: HOSPADM

## 2021-11-13 RX ORDER — HYDROXYZINE HYDROCHLORIDE 25 MG/1
50 TABLET, FILM COATED ORAL
Status: CANCELLED | OUTPATIENT
Start: 2021-11-13

## 2021-11-13 RX ORDER — FLUPHENAZINE HYDROCHLORIDE 5 MG/1
10 TABLET ORAL
Status: DISCONTINUED | OUTPATIENT
Start: 2021-11-13 | End: 2021-11-23 | Stop reason: HOSPADM

## 2021-11-13 RX ORDER — LANOLIN ALCOHOL/MO/W.PET/CERES
3 CREAM (GRAM) TOPICAL
Status: CANCELLED | OUTPATIENT
Start: 2021-11-13

## 2021-11-13 RX ORDER — BUPROPION HYDROCHLORIDE 300 MG/1
300 TABLET ORAL DAILY
Status: CANCELLED | OUTPATIENT
Start: 2021-11-13

## 2021-11-13 RX ORDER — BENZTROPINE MESYLATE 1 MG/ML
0.5 INJECTION INTRAMUSCULAR; INTRAVENOUS
Status: DISCONTINUED | OUTPATIENT
Start: 2021-11-13 | End: 2021-11-23 | Stop reason: HOSPADM

## 2021-11-13 RX ORDER — PANTOPRAZOLE SODIUM 40 MG/1
40 TABLET, DELAYED RELEASE ORAL
Status: DISCONTINUED | OUTPATIENT
Start: 2021-11-14 | End: 2021-11-23 | Stop reason: HOSPADM

## 2021-11-13 RX ORDER — MELATONIN
1000 DAILY
Status: CANCELLED | OUTPATIENT
Start: 2021-11-13

## 2021-11-13 RX ORDER — HYDROXYZINE 50 MG/1
50 TABLET, FILM COATED ORAL
Status: DISCONTINUED | OUTPATIENT
Start: 2021-11-13 | End: 2021-11-23 | Stop reason: HOSPADM

## 2021-11-13 RX ORDER — RISPERIDONE 0.5 MG/1
0.5 TABLET, ORALLY DISINTEGRATING ORAL
Status: CANCELLED | OUTPATIENT
Start: 2021-11-13

## 2021-11-13 RX ORDER — MELATONIN
1000 DAILY
Status: DISCONTINUED | OUTPATIENT
Start: 2021-11-14 | End: 2021-11-23 | Stop reason: HOSPADM

## 2021-11-13 RX ORDER — HYDROXYZINE HYDROCHLORIDE 25 MG/1
25 TABLET, FILM COATED ORAL
Status: CANCELLED | OUTPATIENT
Start: 2021-11-13

## 2021-11-13 RX ORDER — FLUOXETINE HYDROCHLORIDE 20 MG/1
60 CAPSULE ORAL DAILY
Status: CANCELLED | OUTPATIENT
Start: 2021-11-13

## 2021-11-13 RX ORDER — LISINOPRIL 20 MG/1
20 TABLET ORAL DAILY
Status: DISCONTINUED | OUTPATIENT
Start: 2021-11-14 | End: 2021-11-23 | Stop reason: HOSPADM

## 2021-11-13 RX ORDER — HALOPERIDOL 5 MG/ML
5 INJECTION INTRAMUSCULAR
Status: CANCELLED | OUTPATIENT
Start: 2021-11-13

## 2021-11-13 RX ORDER — LORAZEPAM 2 MG/ML
2 INJECTION INTRAMUSCULAR
Status: CANCELLED | OUTPATIENT
Start: 2021-11-13

## 2021-11-13 RX ORDER — ACETAMINOPHEN 325 MG/1
650 TABLET ORAL EVERY 6 HOURS PRN
Status: CANCELLED | OUTPATIENT
Start: 2021-11-13

## 2021-11-13 RX ORDER — FLUPHENAZINE HYDROCHLORIDE 5 MG/1
10 TABLET ORAL
Status: CANCELLED | OUTPATIENT
Start: 2021-11-13

## 2021-11-13 RX ORDER — BENZTROPINE MESYLATE 1 MG/ML
0.5 INJECTION INTRAMUSCULAR; INTRAVENOUS
Status: CANCELLED | OUTPATIENT
Start: 2021-11-13

## 2021-11-13 RX ORDER — MAGNESIUM HYDROXIDE/ALUMINUM HYDROXICE/SIMETHICONE 120; 1200; 1200 MG/30ML; MG/30ML; MG/30ML
30 SUSPENSION ORAL EVERY 4 HOURS PRN
Status: DISCONTINUED | OUTPATIENT
Start: 2021-11-13 | End: 2021-11-23 | Stop reason: HOSPADM

## 2021-11-13 RX ORDER — LISINOPRIL 5 MG/1
20 TABLET ORAL DAILY
Status: CANCELLED | OUTPATIENT
Start: 2021-11-13

## 2021-11-13 RX ORDER — ASPIRIN 81 MG/1
81 TABLET, CHEWABLE ORAL DAILY
Status: CANCELLED | OUTPATIENT
Start: 2021-11-13

## 2021-11-13 RX ORDER — PANTOPRAZOLE SODIUM 40 MG/1
40 TABLET, DELAYED RELEASE ORAL
Status: CANCELLED | OUTPATIENT
Start: 2021-11-14

## 2021-11-13 RX ORDER — HYDROXYZINE HYDROCHLORIDE 25 MG/1
25 TABLET, FILM COATED ORAL
Status: DISCONTINUED | OUTPATIENT
Start: 2021-11-13 | End: 2021-11-23 | Stop reason: HOSPADM

## 2021-11-13 RX ORDER — RISPERIDONE 0.5 MG/1
0.5 TABLET, ORALLY DISINTEGRATING ORAL
Status: DISCONTINUED | OUTPATIENT
Start: 2021-11-13 | End: 2021-11-23 | Stop reason: HOSPADM

## 2021-11-13 RX ORDER — LORAZEPAM 2 MG/ML
1 INJECTION INTRAMUSCULAR EVERY 4 HOURS PRN
Status: CANCELLED | OUTPATIENT
Start: 2021-11-13

## 2021-11-13 RX ORDER — RISPERIDONE 2 MG/1
2 TABLET, ORALLY DISINTEGRATING ORAL
Status: DISCONTINUED | OUTPATIENT
Start: 2021-11-13 | End: 2021-11-23 | Stop reason: HOSPADM

## 2021-11-13 RX ORDER — POLYETHYLENE GLYCOL 3350 17 G/17G
17 POWDER, FOR SOLUTION ORAL DAILY
Status: DISCONTINUED | OUTPATIENT
Start: 2021-11-14 | End: 2021-11-23 | Stop reason: HOSPADM

## 2021-11-13 RX ORDER — RISPERIDONE 1 MG/1
1 TABLET, ORALLY DISINTEGRATING ORAL
Status: CANCELLED | OUTPATIENT
Start: 2021-11-13

## 2021-11-13 RX ORDER — RISPERIDONE 1 MG/1
2 TABLET, ORALLY DISINTEGRATING ORAL
Status: CANCELLED | OUTPATIENT
Start: 2021-11-13

## 2021-11-13 RX ORDER — BENZTROPINE MESYLATE 1 MG/1
1 TABLET ORAL
Status: DISCONTINUED | OUTPATIENT
Start: 2021-11-13 | End: 2021-11-23 | Stop reason: HOSPADM

## 2021-11-13 RX ORDER — LORAZEPAM 2 MG/ML
1 INJECTION INTRAMUSCULAR
Status: CANCELLED | OUTPATIENT
Start: 2021-11-13

## 2021-11-13 RX ORDER — ATORVASTATIN CALCIUM 40 MG/1
40 TABLET, FILM COATED ORAL
Status: CANCELLED | OUTPATIENT
Start: 2021-11-13

## 2021-11-13 RX ORDER — LANOLIN ALCOHOL/MO/W.PET/CERES
3 CREAM (GRAM) TOPICAL
Status: DISCONTINUED | OUTPATIENT
Start: 2021-11-13 | End: 2021-11-23 | Stop reason: HOSPADM

## 2021-11-13 RX ORDER — CALCIUM CARBONATE 500(1250)
1 TABLET ORAL
Status: DISCONTINUED | OUTPATIENT
Start: 2021-11-14 | End: 2021-11-23 | Stop reason: HOSPADM

## 2021-11-13 RX ORDER — ASPIRIN 81 MG/1
81 TABLET, CHEWABLE ORAL DAILY
Status: DISCONTINUED | OUTPATIENT
Start: 2021-11-14 | End: 2021-11-23 | Stop reason: HOSPADM

## 2021-11-13 RX ORDER — VALPROIC ACID 250 MG/1
500 CAPSULE, LIQUID FILLED ORAL EVERY 6 HOURS SCHEDULED
Status: DISCONTINUED | OUTPATIENT
Start: 2021-11-14 | End: 2021-11-23 | Stop reason: HOSPADM

## 2021-11-13 RX ORDER — LORAZEPAM 2 MG/ML
1 INJECTION INTRAMUSCULAR
Status: DISCONTINUED | OUTPATIENT
Start: 2021-11-13 | End: 2021-11-23 | Stop reason: HOSPADM

## 2021-11-13 RX ORDER — BUPROPION HYDROCHLORIDE 300 MG/1
300 TABLET ORAL DAILY
Status: DISCONTINUED | OUTPATIENT
Start: 2021-11-14 | End: 2021-11-23 | Stop reason: HOSPADM

## 2021-11-13 RX ORDER — HALOPERIDOL 5 MG/ML
2.5 INJECTION INTRAMUSCULAR
Status: CANCELLED | OUTPATIENT
Start: 2021-11-13

## 2021-11-13 RX ORDER — BENZTROPINE MESYLATE 0.5 MG/1
1 TABLET ORAL
Status: CANCELLED | OUTPATIENT
Start: 2021-11-13

## 2021-11-13 RX ORDER — VALPROIC ACID 250 MG/1
500 CAPSULE, LIQUID FILLED ORAL EVERY 6 HOURS SCHEDULED
Status: CANCELLED | OUTPATIENT
Start: 2021-11-13

## 2021-11-13 RX ORDER — MAGNESIUM HYDROXIDE/ALUMINUM HYDROXICE/SIMETHICONE 120; 1200; 1200 MG/30ML; MG/30ML; MG/30ML
30 SUSPENSION ORAL EVERY 4 HOURS PRN
Status: CANCELLED | OUTPATIENT
Start: 2021-11-13

## 2021-11-13 RX ADMIN — BUPROPION HYDROCHLORIDE 300 MG: 300 TABLET, FILM COATED, EXTENDED RELEASE ORAL at 08:04

## 2021-11-13 RX ADMIN — VALPROIC ACID 500 MG: 250 CAPSULE, LIQUID FILLED ORAL at 11:39

## 2021-11-13 RX ADMIN — VALPROIC ACID 500 MG: 250 CAPSULE, LIQUID FILLED ORAL at 00:59

## 2021-11-13 RX ADMIN — ATORVASTATIN CALCIUM 40 MG: 40 TABLET, FILM COATED ORAL at 16:38

## 2021-11-13 RX ADMIN — VALPROIC ACID 500 MG: 250 CAPSULE, LIQUID FILLED ORAL at 06:16

## 2021-11-13 RX ADMIN — PRAZOSIN HYDROCHLORIDE 2 MG: 1 CAPSULE ORAL at 21:32

## 2021-11-13 RX ADMIN — LISINOPRIL 20 MG: 5 TABLET ORAL at 08:03

## 2021-11-13 RX ADMIN — ASPIRIN 81 MG CHEWABLE TABLET 81 MG: 81 TABLET CHEWABLE at 08:02

## 2021-11-13 RX ADMIN — VALPROIC ACID 500 MG: 250 CAPSULE, LIQUID FILLED ORAL at 23:10

## 2021-11-13 RX ADMIN — PANTOPRAZOLE SODIUM 40 MG: 40 TABLET, DELAYED RELEASE ORAL at 06:16

## 2021-11-13 RX ADMIN — Medication 1000 UNITS: at 08:04

## 2021-11-13 RX ADMIN — VALPROIC ACID 500 MG: 250 CAPSULE, LIQUID FILLED ORAL at 17:31

## 2021-11-13 RX ADMIN — FLUOXETINE 60 MG: 20 CAPSULE ORAL at 08:02

## 2021-11-13 RX ADMIN — CALCIUM 1 TABLET: 500 TABLET ORAL at 08:04

## 2021-11-13 RX ADMIN — FLUPHENAZINE HYDROCHLORIDE 10 MG: 5 TABLET, FILM COATED ORAL at 21:32

## 2021-11-14 PROBLEM — S92.353A CLOSED FRACTURE OF BASE OF FIFTH METATARSAL BONE: Status: ACTIVE | Noted: 2021-11-14

## 2021-11-14 PROCEDURE — 99223 1ST HOSP IP/OBS HIGH 75: CPT | Performed by: PHYSICIAN ASSISTANT

## 2021-11-14 PROCEDURE — 99223 1ST HOSP IP/OBS HIGH 75: CPT | Performed by: PSYCHIATRY & NEUROLOGY

## 2021-11-14 RX ORDER — NYSTATIN 100000 [USP'U]/G
POWDER TOPICAL 2 TIMES DAILY
Status: DISCONTINUED | OUTPATIENT
Start: 2021-11-14 | End: 2021-11-23 | Stop reason: HOSPADM

## 2021-11-14 RX ADMIN — VALPROIC ACID 500 MG: 250 CAPSULE, LIQUID FILLED ORAL at 06:33

## 2021-11-14 RX ADMIN — ASPIRIN 81 MG CHEWABLE TABLET 81 MG: 81 TABLET CHEWABLE at 09:53

## 2021-11-14 RX ADMIN — FLUPHENAZINE HYDROCHLORIDE 10 MG: 5 TABLET, FILM COATED ORAL at 21:28

## 2021-11-14 RX ADMIN — CALCIUM 1 TABLET: 500 TABLET ORAL at 09:52

## 2021-11-14 RX ADMIN — PRAZOSIN HYDROCHLORIDE 2 MG: 1 CAPSULE ORAL at 21:28

## 2021-11-14 RX ADMIN — BUPROPION HYDROCHLORIDE 300 MG: 300 TABLET, FILM COATED, EXTENDED RELEASE ORAL at 09:53

## 2021-11-14 RX ADMIN — PANTOPRAZOLE SODIUM 40 MG: 40 TABLET, DELAYED RELEASE ORAL at 06:33

## 2021-11-14 RX ADMIN — FLUOXETINE 60 MG: 20 CAPSULE ORAL at 09:53

## 2021-11-14 RX ADMIN — VALPROIC ACID 500 MG: 250 CAPSULE, LIQUID FILLED ORAL at 14:41

## 2021-11-14 RX ADMIN — LISINOPRIL 20 MG: 20 TABLET ORAL at 09:53

## 2021-11-14 RX ADMIN — Medication 1000 UNITS: at 09:53

## 2021-11-14 RX ADMIN — ACETAMINOPHEN 975 MG: 325 TABLET, FILM COATED ORAL at 10:02

## 2021-11-14 RX ADMIN — RISPERIDONE 0.5 MG: 0.5 TABLET, ORALLY DISINTEGRATING ORAL at 17:10

## 2021-11-14 RX ADMIN — VALPROIC ACID 500 MG: 250 CAPSULE, LIQUID FILLED ORAL at 23:01

## 2021-11-14 RX ADMIN — NYSTATIN: 100000 POWDER TOPICAL at 21:46

## 2021-11-14 RX ADMIN — VALPROIC ACID 500 MG: 250 CAPSULE, LIQUID FILLED ORAL at 17:50

## 2021-11-14 RX ADMIN — ATORVASTATIN CALCIUM 40 MG: 40 TABLET, FILM COATED ORAL at 17:07

## 2021-11-15 PROCEDURE — 99232 SBSQ HOSP IP/OBS MODERATE 35: CPT | Performed by: STUDENT IN AN ORGANIZED HEALTH CARE EDUCATION/TRAINING PROGRAM

## 2021-11-15 RX ORDER — CALCIUM CARBONATE 200(500)MG
1 TABLET,CHEWABLE ORAL DAILY
COMMUNITY
End: 2021-11-23 | Stop reason: HOSPADM

## 2021-11-15 RX ORDER — IBUPROFEN 600 MG/1
600 TABLET ORAL EVERY 6 HOURS PRN
Status: DISCONTINUED | OUTPATIENT
Start: 2021-11-15 | End: 2021-11-23 | Stop reason: HOSPADM

## 2021-11-15 RX ORDER — FLUPHENAZINE HYDROCHLORIDE 5 MG/1
5 TABLET ORAL 2 TIMES DAILY
Status: DISCONTINUED | OUTPATIENT
Start: 2021-11-15 | End: 2021-11-23 | Stop reason: HOSPADM

## 2021-11-15 RX ORDER — LORAZEPAM 1 MG/1
1 TABLET ORAL EVERY 8 HOURS PRN
COMMUNITY
End: 2021-11-23 | Stop reason: HOSPADM

## 2021-11-15 RX ADMIN — ASPIRIN 81 MG CHEWABLE TABLET 81 MG: 81 TABLET CHEWABLE at 08:53

## 2021-11-15 RX ADMIN — NYSTATIN 1 APPLICATION: 100000 POWDER TOPICAL at 17:21

## 2021-11-15 RX ADMIN — IBUPROFEN 600 MG: 600 TABLET ORAL at 13:36

## 2021-11-15 RX ADMIN — VALPROIC ACID 500 MG: 250 CAPSULE, LIQUID FILLED ORAL at 05:34

## 2021-11-15 RX ADMIN — LISINOPRIL 20 MG: 20 TABLET ORAL at 08:53

## 2021-11-15 RX ADMIN — VALPROIC ACID 500 MG: 250 CAPSULE, LIQUID FILLED ORAL at 23:13

## 2021-11-15 RX ADMIN — PANTOPRAZOLE SODIUM 40 MG: 40 TABLET, DELAYED RELEASE ORAL at 05:34

## 2021-11-15 RX ADMIN — ATORVASTATIN CALCIUM 40 MG: 40 TABLET, FILM COATED ORAL at 17:16

## 2021-11-15 RX ADMIN — Medication 1000 UNITS: at 08:53

## 2021-11-15 RX ADMIN — FLUOXETINE 60 MG: 20 CAPSULE ORAL at 08:53

## 2021-11-15 RX ADMIN — VALPROIC ACID 500 MG: 250 CAPSULE, LIQUID FILLED ORAL at 17:15

## 2021-11-15 RX ADMIN — FLUPHENAZINE HYDROCHLORIDE 10 MG: 5 TABLET, FILM COATED ORAL at 21:08

## 2021-11-15 RX ADMIN — CALCIUM 1 TABLET: 500 TABLET ORAL at 08:53

## 2021-11-15 RX ADMIN — PRAZOSIN HYDROCHLORIDE 2 MG: 1 CAPSULE ORAL at 21:08

## 2021-11-15 RX ADMIN — BUPROPION HYDROCHLORIDE 300 MG: 300 TABLET, FILM COATED, EXTENDED RELEASE ORAL at 08:53

## 2021-11-15 RX ADMIN — FLUPHENAZINE HYDROCHLORIDE 5 MG: 5 TABLET, FILM COATED ORAL at 13:35

## 2021-11-15 RX ADMIN — NYSTATIN: 100000 POWDER TOPICAL at 06:51

## 2021-11-15 RX ADMIN — VALPROIC ACID 500 MG: 250 CAPSULE, LIQUID FILLED ORAL at 12:08

## 2021-11-16 PROCEDURE — 99232 SBSQ HOSP IP/OBS MODERATE 35: CPT | Performed by: PHYSICIAN ASSISTANT

## 2021-11-16 RX ADMIN — ASPIRIN 81 MG CHEWABLE TABLET 81 MG: 81 TABLET CHEWABLE at 09:35

## 2021-11-16 RX ADMIN — VALPROIC ACID 500 MG: 250 CAPSULE, LIQUID FILLED ORAL at 23:27

## 2021-11-16 RX ADMIN — PRAZOSIN HYDROCHLORIDE 2 MG: 1 CAPSULE ORAL at 21:32

## 2021-11-16 RX ADMIN — FLUPHENAZINE HYDROCHLORIDE 10 MG: 5 TABLET, FILM COATED ORAL at 21:33

## 2021-11-16 RX ADMIN — VALPROIC ACID 500 MG: 250 CAPSULE, LIQUID FILLED ORAL at 06:17

## 2021-11-16 RX ADMIN — FLUPHENAZINE HYDROCHLORIDE 5 MG: 5 TABLET, FILM COATED ORAL at 09:35

## 2021-11-16 RX ADMIN — LORAZEPAM 1 MG: 1 TABLET ORAL at 15:30

## 2021-11-16 RX ADMIN — CALCIUM 1 TABLET: 500 TABLET ORAL at 09:35

## 2021-11-16 RX ADMIN — ATORVASTATIN CALCIUM 40 MG: 40 TABLET, FILM COATED ORAL at 17:11

## 2021-11-16 RX ADMIN — FLUPHENAZINE HYDROCHLORIDE 5 MG: 5 TABLET, FILM COATED ORAL at 17:12

## 2021-11-16 RX ADMIN — VALPROIC ACID 500 MG: 250 CAPSULE, LIQUID FILLED ORAL at 12:18

## 2021-11-16 RX ADMIN — PANTOPRAZOLE SODIUM 40 MG: 40 TABLET, DELAYED RELEASE ORAL at 06:17

## 2021-11-16 RX ADMIN — VALPROIC ACID 500 MG: 250 CAPSULE, LIQUID FILLED ORAL at 17:12

## 2021-11-16 RX ADMIN — NYSTATIN 1 APPLICATION: 100000 POWDER TOPICAL at 09:38

## 2021-11-16 RX ADMIN — LISINOPRIL 20 MG: 20 TABLET ORAL at 09:35

## 2021-11-16 RX ADMIN — Medication 1000 UNITS: at 09:35

## 2021-11-16 RX ADMIN — FLUOXETINE 60 MG: 20 CAPSULE ORAL at 09:35

## 2021-11-16 RX ADMIN — BUPROPION HYDROCHLORIDE 300 MG: 300 TABLET, FILM COATED, EXTENDED RELEASE ORAL at 09:35

## 2021-11-17 PROCEDURE — 99232 SBSQ HOSP IP/OBS MODERATE 35: CPT | Performed by: PHYSICIAN ASSISTANT

## 2021-11-17 RX ORDER — FLUOXETINE HYDROCHLORIDE 20 MG/1
80 CAPSULE ORAL DAILY
Status: DISCONTINUED | OUTPATIENT
Start: 2021-11-18 | End: 2021-11-23 | Stop reason: HOSPADM

## 2021-11-17 RX ADMIN — VALPROIC ACID 500 MG: 250 CAPSULE, LIQUID FILLED ORAL at 23:13

## 2021-11-17 RX ADMIN — NYSTATIN: 100000 POWDER TOPICAL at 21:37

## 2021-11-17 RX ADMIN — PANTOPRAZOLE SODIUM 40 MG: 40 TABLET, DELAYED RELEASE ORAL at 06:09

## 2021-11-17 RX ADMIN — ATORVASTATIN CALCIUM 40 MG: 40 TABLET, FILM COATED ORAL at 17:01

## 2021-11-17 RX ADMIN — LISINOPRIL 20 MG: 20 TABLET ORAL at 08:11

## 2021-11-17 RX ADMIN — VALPROIC ACID 500 MG: 250 CAPSULE, LIQUID FILLED ORAL at 17:02

## 2021-11-17 RX ADMIN — ASPIRIN 81 MG CHEWABLE TABLET 81 MG: 81 TABLET CHEWABLE at 08:11

## 2021-11-17 RX ADMIN — FLUPHENAZINE HYDROCHLORIDE 5 MG: 5 TABLET, FILM COATED ORAL at 08:11

## 2021-11-17 RX ADMIN — Medication 1000 UNITS: at 08:11

## 2021-11-17 RX ADMIN — FLUOXETINE 60 MG: 20 CAPSULE ORAL at 08:12

## 2021-11-17 RX ADMIN — PRAZOSIN HYDROCHLORIDE 2 MG: 1 CAPSULE ORAL at 21:36

## 2021-11-17 RX ADMIN — BUPROPION HYDROCHLORIDE 300 MG: 300 TABLET, FILM COATED, EXTENDED RELEASE ORAL at 08:11

## 2021-11-17 RX ADMIN — FLUPHENAZINE HYDROCHLORIDE 10 MG: 5 TABLET, FILM COATED ORAL at 21:35

## 2021-11-17 RX ADMIN — VALPROIC ACID 500 MG: 250 CAPSULE, LIQUID FILLED ORAL at 11:24

## 2021-11-17 RX ADMIN — FLUPHENAZINE HYDROCHLORIDE 5 MG: 5 TABLET, FILM COATED ORAL at 17:02

## 2021-11-17 RX ADMIN — VALPROIC ACID 500 MG: 250 CAPSULE, LIQUID FILLED ORAL at 06:09

## 2021-11-17 RX ADMIN — CALCIUM 1 TABLET: 500 TABLET ORAL at 08:11

## 2021-11-18 PROCEDURE — 99232 SBSQ HOSP IP/OBS MODERATE 35: CPT | Performed by: PHYSICIAN ASSISTANT

## 2021-11-18 RX ADMIN — LISINOPRIL 20 MG: 20 TABLET ORAL at 09:21

## 2021-11-18 RX ADMIN — Medication 1000 UNITS: at 09:21

## 2021-11-18 RX ADMIN — PRAZOSIN HYDROCHLORIDE 2 MG: 1 CAPSULE ORAL at 21:24

## 2021-11-18 RX ADMIN — ATORVASTATIN CALCIUM 40 MG: 40 TABLET, FILM COATED ORAL at 15:57

## 2021-11-18 RX ADMIN — VALPROIC ACID 500 MG: 250 CAPSULE, LIQUID FILLED ORAL at 18:01

## 2021-11-18 RX ADMIN — VALPROIC ACID 500 MG: 250 CAPSULE, LIQUID FILLED ORAL at 12:04

## 2021-11-18 RX ADMIN — FLUPHENAZINE HYDROCHLORIDE 5 MG: 5 TABLET, FILM COATED ORAL at 09:21

## 2021-11-18 RX ADMIN — NYSTATIN 1 APPLICATION: 100000 POWDER TOPICAL at 18:08

## 2021-11-18 RX ADMIN — BUPROPION HYDROCHLORIDE 300 MG: 300 TABLET, FILM COATED, EXTENDED RELEASE ORAL at 09:21

## 2021-11-18 RX ADMIN — VALPROIC ACID 500 MG: 250 CAPSULE, LIQUID FILLED ORAL at 06:15

## 2021-11-18 RX ADMIN — VALPROIC ACID 500 MG: 250 CAPSULE, LIQUID FILLED ORAL at 23:25

## 2021-11-18 RX ADMIN — ASPIRIN 81 MG CHEWABLE TABLET 81 MG: 81 TABLET CHEWABLE at 09:21

## 2021-11-18 RX ADMIN — FLUPHENAZINE HYDROCHLORIDE 10 MG: 5 TABLET, FILM COATED ORAL at 21:24

## 2021-11-18 RX ADMIN — FLUOXETINE 80 MG: 20 CAPSULE ORAL at 09:21

## 2021-11-18 RX ADMIN — FLUPHENAZINE HYDROCHLORIDE 5 MG: 5 TABLET, FILM COATED ORAL at 18:01

## 2021-11-18 RX ADMIN — CALCIUM 1 TABLET: 500 TABLET ORAL at 09:21

## 2021-11-18 RX ADMIN — PANTOPRAZOLE SODIUM 40 MG: 40 TABLET, DELAYED RELEASE ORAL at 06:15

## 2021-11-19 PROCEDURE — 99232 SBSQ HOSP IP/OBS MODERATE 35: CPT | Performed by: PHYSICIAN ASSISTANT

## 2021-11-19 RX ADMIN — CALCIUM 1 TABLET: 500 TABLET ORAL at 09:03

## 2021-11-19 RX ADMIN — ASPIRIN 81 MG CHEWABLE TABLET 81 MG: 81 TABLET CHEWABLE at 09:04

## 2021-11-19 RX ADMIN — PANTOPRAZOLE SODIUM 40 MG: 40 TABLET, DELAYED RELEASE ORAL at 06:24

## 2021-11-19 RX ADMIN — VALPROIC ACID 500 MG: 250 CAPSULE, LIQUID FILLED ORAL at 06:24

## 2021-11-19 RX ADMIN — ATORVASTATIN CALCIUM 40 MG: 40 TABLET, FILM COATED ORAL at 17:07

## 2021-11-19 RX ADMIN — PRAZOSIN HYDROCHLORIDE 2 MG: 1 CAPSULE ORAL at 21:18

## 2021-11-19 RX ADMIN — BUPROPION HYDROCHLORIDE 300 MG: 300 TABLET, FILM COATED, EXTENDED RELEASE ORAL at 09:04

## 2021-11-19 RX ADMIN — VALPROIC ACID 500 MG: 250 CAPSULE, LIQUID FILLED ORAL at 17:07

## 2021-11-19 RX ADMIN — NYSTATIN: 100000 POWDER TOPICAL at 17:08

## 2021-11-19 RX ADMIN — VALPROIC ACID 500 MG: 250 CAPSULE, LIQUID FILLED ORAL at 23:01

## 2021-11-19 RX ADMIN — LISINOPRIL 20 MG: 20 TABLET ORAL at 09:03

## 2021-11-19 RX ADMIN — FLUPHENAZINE HYDROCHLORIDE 5 MG: 5 TABLET, FILM COATED ORAL at 17:07

## 2021-11-19 RX ADMIN — VALPROIC ACID 500 MG: 250 CAPSULE, LIQUID FILLED ORAL at 12:10

## 2021-11-19 RX ADMIN — FLUPHENAZINE HYDROCHLORIDE 5 MG: 5 TABLET, FILM COATED ORAL at 09:04

## 2021-11-19 RX ADMIN — Medication 1000 UNITS: at 09:04

## 2021-11-19 RX ADMIN — FLUPHENAZINE HYDROCHLORIDE 10 MG: 5 TABLET, FILM COATED ORAL at 21:18

## 2021-11-19 RX ADMIN — FLUOXETINE 80 MG: 20 CAPSULE ORAL at 09:04

## 2021-11-20 PROCEDURE — 99232 SBSQ HOSP IP/OBS MODERATE 35: CPT | Performed by: STUDENT IN AN ORGANIZED HEALTH CARE EDUCATION/TRAINING PROGRAM

## 2021-11-20 RX ADMIN — PANTOPRAZOLE SODIUM 40 MG: 40 TABLET, DELAYED RELEASE ORAL at 06:08

## 2021-11-20 RX ADMIN — PRAZOSIN HYDROCHLORIDE 2 MG: 1 CAPSULE ORAL at 21:16

## 2021-11-20 RX ADMIN — VALPROIC ACID 500 MG: 250 CAPSULE, LIQUID FILLED ORAL at 23:26

## 2021-11-20 RX ADMIN — FLUPHENAZINE HYDROCHLORIDE 10 MG: 5 TABLET, FILM COATED ORAL at 21:16

## 2021-11-20 RX ADMIN — FLUPHENAZINE HYDROCHLORIDE 5 MG: 5 TABLET, FILM COATED ORAL at 08:22

## 2021-11-20 RX ADMIN — Medication 1000 UNITS: at 08:22

## 2021-11-20 RX ADMIN — ATORVASTATIN CALCIUM 40 MG: 40 TABLET, FILM COATED ORAL at 17:13

## 2021-11-20 RX ADMIN — FLUOXETINE 80 MG: 20 CAPSULE ORAL at 08:22

## 2021-11-20 RX ADMIN — ASPIRIN 81 MG CHEWABLE TABLET 81 MG: 81 TABLET CHEWABLE at 08:22

## 2021-11-20 RX ADMIN — NYSTATIN 1 APPLICATION: 100000 POWDER TOPICAL at 21:27

## 2021-11-20 RX ADMIN — VALPROIC ACID 500 MG: 250 CAPSULE, LIQUID FILLED ORAL at 12:23

## 2021-11-20 RX ADMIN — VALPROIC ACID 500 MG: 250 CAPSULE, LIQUID FILLED ORAL at 17:13

## 2021-11-20 RX ADMIN — LISINOPRIL 20 MG: 20 TABLET ORAL at 08:22

## 2021-11-20 RX ADMIN — FLUPHENAZINE HYDROCHLORIDE 5 MG: 5 TABLET, FILM COATED ORAL at 17:13

## 2021-11-20 RX ADMIN — CALCIUM 1 TABLET: 500 TABLET ORAL at 08:22

## 2021-11-20 RX ADMIN — VALPROIC ACID 500 MG: 250 CAPSULE, LIQUID FILLED ORAL at 06:08

## 2021-11-20 RX ADMIN — BUPROPION HYDROCHLORIDE 300 MG: 300 TABLET, FILM COATED, EXTENDED RELEASE ORAL at 08:22

## 2021-11-21 PROCEDURE — 99232 SBSQ HOSP IP/OBS MODERATE 35: CPT | Performed by: STUDENT IN AN ORGANIZED HEALTH CARE EDUCATION/TRAINING PROGRAM

## 2021-11-21 RX ADMIN — NYSTATIN 1 APPLICATION: 100000 POWDER TOPICAL at 21:44

## 2021-11-21 RX ADMIN — FLUOXETINE 80 MG: 20 CAPSULE ORAL at 10:42

## 2021-11-21 RX ADMIN — PANTOPRAZOLE SODIUM 40 MG: 40 TABLET, DELAYED RELEASE ORAL at 06:18

## 2021-11-21 RX ADMIN — FLUPHENAZINE HYDROCHLORIDE 5 MG: 5 TABLET, FILM COATED ORAL at 10:43

## 2021-11-21 RX ADMIN — VALPROIC ACID 500 MG: 250 CAPSULE, LIQUID FILLED ORAL at 17:11

## 2021-11-21 RX ADMIN — ATORVASTATIN CALCIUM 40 MG: 40 TABLET, FILM COATED ORAL at 17:11

## 2021-11-21 RX ADMIN — FLUPHENAZINE HYDROCHLORIDE 10 MG: 5 TABLET, FILM COATED ORAL at 21:30

## 2021-11-21 RX ADMIN — LISINOPRIL 20 MG: 20 TABLET ORAL at 10:43

## 2021-11-21 RX ADMIN — VALPROIC ACID 500 MG: 250 CAPSULE, LIQUID FILLED ORAL at 23:13

## 2021-11-21 RX ADMIN — VALPROIC ACID 500 MG: 250 CAPSULE, LIQUID FILLED ORAL at 12:33

## 2021-11-21 RX ADMIN — ASPIRIN 81 MG CHEWABLE TABLET 81 MG: 81 TABLET CHEWABLE at 10:42

## 2021-11-21 RX ADMIN — FLUPHENAZINE HYDROCHLORIDE 5 MG: 5 TABLET, FILM COATED ORAL at 17:11

## 2021-11-21 RX ADMIN — PRAZOSIN HYDROCHLORIDE 2 MG: 1 CAPSULE ORAL at 21:30

## 2021-11-21 RX ADMIN — CALCIUM 1 TABLET: 500 TABLET ORAL at 10:43

## 2021-11-21 RX ADMIN — BUPROPION HYDROCHLORIDE 300 MG: 300 TABLET, FILM COATED, EXTENDED RELEASE ORAL at 10:42

## 2021-11-21 RX ADMIN — Medication 1000 UNITS: at 10:43

## 2021-11-21 RX ADMIN — VALPROIC ACID 500 MG: 250 CAPSULE, LIQUID FILLED ORAL at 06:18

## 2021-11-22 PROBLEM — Z00.8 MEDICAL CLEARANCE FOR PSYCHIATRIC ADMISSION: Status: RESOLVED | Noted: 2021-01-09 | Resolved: 2021-11-22

## 2021-11-22 PROCEDURE — 99232 SBSQ HOSP IP/OBS MODERATE 35: CPT | Performed by: PHYSICIAN ASSISTANT

## 2021-11-22 PROCEDURE — 0241U HB NFCT DS VIR RESP RNA 4 TRGT: CPT | Performed by: PHYSICIAN ASSISTANT

## 2021-11-22 RX ORDER — LISINOPRIL 20 MG/1
20 TABLET ORAL DAILY
Qty: 30 TABLET | Refills: 0 | Status: ON HOLD | OUTPATIENT
Start: 2021-11-22 | End: 2021-12-07 | Stop reason: SDUPTHER

## 2021-11-22 RX ORDER — PRAZOSIN HYDROCHLORIDE 2 MG/1
2 CAPSULE ORAL
Qty: 30 CAPSULE | Refills: 0 | Status: ON HOLD | OUTPATIENT
Start: 2021-11-22 | End: 2021-12-07 | Stop reason: SDUPTHER

## 2021-11-22 RX ORDER — VALPROIC ACID 250 MG/1
500 CAPSULE, LIQUID FILLED ORAL EVERY 6 HOURS SCHEDULED
Qty: 240 CAPSULE | Refills: 1 | Status: SHIPPED | OUTPATIENT
Start: 2021-11-22 | End: 2021-12-08 | Stop reason: HOSPADM

## 2021-11-22 RX ORDER — POLYETHYLENE GLYCOL 3350 17 G/17G
17 POWDER, FOR SOLUTION ORAL DAILY
Qty: 510 G | Refills: 0 | Status: ON HOLD | OUTPATIENT
Start: 2021-11-22 | End: 2021-12-07 | Stop reason: SDUPTHER

## 2021-11-22 RX ORDER — MELATONIN
1000 DAILY
Qty: 30 TABLET | Refills: 0 | Status: SHIPPED | OUTPATIENT
Start: 2021-11-23 | End: 2021-12-08 | Stop reason: HOSPADM

## 2021-11-22 RX ORDER — PANTOPRAZOLE SODIUM 40 MG/1
40 TABLET, DELAYED RELEASE ORAL
Qty: 30 TABLET | Refills: 0 | Status: ON HOLD | OUTPATIENT
Start: 2021-11-22 | End: 2021-12-07 | Stop reason: SDUPTHER

## 2021-11-22 RX ORDER — FLUPHENAZINE HYDROCHLORIDE 5 MG/1
5 TABLET ORAL
Qty: 60 TABLET | Refills: 1 | Status: ON HOLD | OUTPATIENT
Start: 2021-11-22 | End: 2021-12-07 | Stop reason: SDUPTHER

## 2021-11-22 RX ORDER — FLUPHENAZINE HYDROCHLORIDE 10 MG/1
10 TABLET ORAL
Qty: 30 TABLET | Refills: 1 | Status: SHIPPED | OUTPATIENT
Start: 2021-11-22 | End: 2021-12-08 | Stop reason: HOSPADM

## 2021-11-22 RX ORDER — BUPROPION HYDROCHLORIDE 300 MG/1
300 TABLET ORAL DAILY
Qty: 30 TABLET | Refills: 1 | Status: ON HOLD | OUTPATIENT
Start: 2021-11-22 | End: 2021-12-07 | Stop reason: SDUPTHER

## 2021-11-22 RX ORDER — FLUOXETINE HYDROCHLORIDE 40 MG/1
80 CAPSULE ORAL DAILY
Qty: 60 CAPSULE | Refills: 1 | Status: ON HOLD | OUTPATIENT
Start: 2021-11-23 | End: 2021-12-07 | Stop reason: SDUPTHER

## 2021-11-22 RX ORDER — CALCIUM CARBONATE 500(1250)
1 TABLET ORAL
Qty: 30 TABLET | Refills: 0 | Status: ON HOLD | OUTPATIENT
Start: 2021-11-23 | End: 2021-12-07 | Stop reason: SDUPTHER

## 2021-11-22 RX ORDER — ASPIRIN 81 MG/1
81 TABLET, CHEWABLE ORAL DAILY
Qty: 30 TABLET | Refills: 0 | Status: SHIPPED | OUTPATIENT
Start: 2021-11-23 | End: 2021-12-08 | Stop reason: HOSPADM

## 2021-11-22 RX ORDER — NYSTATIN 100000 [USP'U]/G
POWDER TOPICAL 2 TIMES DAILY
Qty: 15 G | Refills: 0 | Status: SHIPPED | OUTPATIENT
Start: 2021-11-22 | End: 2022-01-06 | Stop reason: HOSPADM

## 2021-11-22 RX ORDER — ATORVASTATIN CALCIUM 40 MG/1
40 TABLET, FILM COATED ORAL
Qty: 30 TABLET | Refills: 0 | Status: ON HOLD | OUTPATIENT
Start: 2021-11-22 | End: 2021-12-07 | Stop reason: SDUPTHER

## 2021-11-22 RX ADMIN — NYSTATIN 1 APPLICATION: 100000 POWDER TOPICAL at 08:30

## 2021-11-22 RX ADMIN — ATORVASTATIN CALCIUM 40 MG: 40 TABLET, FILM COATED ORAL at 17:25

## 2021-11-22 RX ADMIN — FLUOXETINE 80 MG: 20 CAPSULE ORAL at 08:33

## 2021-11-22 RX ADMIN — Medication 1000 UNITS: at 08:33

## 2021-11-22 RX ADMIN — ASPIRIN 81 MG CHEWABLE TABLET 81 MG: 81 TABLET CHEWABLE at 08:33

## 2021-11-22 RX ADMIN — FLUPHENAZINE HYDROCHLORIDE 5 MG: 5 TABLET, FILM COATED ORAL at 17:25

## 2021-11-22 RX ADMIN — VALPROIC ACID 500 MG: 250 CAPSULE, LIQUID FILLED ORAL at 17:25

## 2021-11-22 RX ADMIN — PANTOPRAZOLE SODIUM 40 MG: 40 TABLET, DELAYED RELEASE ORAL at 06:08

## 2021-11-22 RX ADMIN — NYSTATIN 1 APPLICATION: 100000 POWDER TOPICAL at 17:26

## 2021-11-22 RX ADMIN — PRAZOSIN HYDROCHLORIDE 2 MG: 1 CAPSULE ORAL at 21:14

## 2021-11-22 RX ADMIN — BUPROPION HYDROCHLORIDE 300 MG: 300 TABLET, FILM COATED, EXTENDED RELEASE ORAL at 08:33

## 2021-11-22 RX ADMIN — VALPROIC ACID 500 MG: 250 CAPSULE, LIQUID FILLED ORAL at 23:02

## 2021-11-22 RX ADMIN — FLUPHENAZINE HYDROCHLORIDE 5 MG: 5 TABLET, FILM COATED ORAL at 08:33

## 2021-11-22 RX ADMIN — VALPROIC ACID 500 MG: 250 CAPSULE, LIQUID FILLED ORAL at 12:11

## 2021-11-22 RX ADMIN — LISINOPRIL 20 MG: 20 TABLET ORAL at 08:33

## 2021-11-22 RX ADMIN — FLUPHENAZINE HYDROCHLORIDE 10 MG: 5 TABLET, FILM COATED ORAL at 21:13

## 2021-11-22 RX ADMIN — VALPROIC ACID 500 MG: 250 CAPSULE, LIQUID FILLED ORAL at 06:07

## 2021-11-22 RX ADMIN — CALCIUM 1 TABLET: 500 TABLET ORAL at 08:30

## 2021-11-23 VITALS
SYSTOLIC BLOOD PRESSURE: 123 MMHG | WEIGHT: 260.14 LBS | HEIGHT: 64 IN | BODY MASS INDEX: 44.41 KG/M2 | HEART RATE: 75 BPM | RESPIRATION RATE: 18 BRPM | DIASTOLIC BLOOD PRESSURE: 70 MMHG | OXYGEN SATURATION: 98 % | TEMPERATURE: 98.2 F

## 2021-11-23 PROCEDURE — 99239 HOSP IP/OBS DSCHRG MGMT >30: CPT | Performed by: PHYSICIAN ASSISTANT

## 2021-11-23 RX ADMIN — Medication 1000 UNITS: at 08:26

## 2021-11-23 RX ADMIN — ASPIRIN 81 MG CHEWABLE TABLET 81 MG: 81 TABLET CHEWABLE at 08:26

## 2021-11-23 RX ADMIN — VALPROIC ACID 500 MG: 250 CAPSULE, LIQUID FILLED ORAL at 12:19

## 2021-11-23 RX ADMIN — FLUPHENAZINE HYDROCHLORIDE 5 MG: 5 TABLET, FILM COATED ORAL at 08:26

## 2021-11-23 RX ADMIN — PANTOPRAZOLE SODIUM 40 MG: 40 TABLET, DELAYED RELEASE ORAL at 05:41

## 2021-11-23 RX ADMIN — FLUOXETINE 80 MG: 20 CAPSULE ORAL at 08:25

## 2021-11-23 RX ADMIN — VALPROIC ACID 500 MG: 250 CAPSULE, LIQUID FILLED ORAL at 05:41

## 2021-11-23 RX ADMIN — LISINOPRIL 20 MG: 20 TABLET ORAL at 08:26

## 2021-11-23 RX ADMIN — CALCIUM 1 TABLET: 500 TABLET ORAL at 08:26

## 2021-11-23 RX ADMIN — BUPROPION HYDROCHLORIDE 300 MG: 300 TABLET, FILM COATED, EXTENDED RELEASE ORAL at 08:26

## 2021-12-02 ENCOUNTER — OFFICE VISIT (OUTPATIENT)
Dept: PODIATRY | Facility: CLINIC | Age: 51
End: 2021-12-02
Payer: MEDICARE

## 2021-12-02 VITALS — HEIGHT: 64 IN | WEIGHT: 270 LBS | BODY MASS INDEX: 46.1 KG/M2

## 2021-12-02 DIAGNOSIS — S92.352D CLOSED DISPLACED FRACTURE OF FIFTH METATARSAL BONE OF LEFT FOOT WITH ROUTINE HEALING, SUBSEQUENT ENCOUNTER: Primary | ICD-10-CM

## 2021-12-02 PROCEDURE — 99202 OFFICE O/P NEW SF 15 MIN: CPT | Performed by: PODIATRIST

## 2021-12-04 ENCOUNTER — HOSPITAL ENCOUNTER (EMERGENCY)
Facility: HOSPITAL | Age: 51
DRG: 885 | End: 2021-12-05
Attending: EMERGENCY MEDICINE | Admitting: EMERGENCY MEDICINE
Payer: MEDICARE

## 2021-12-04 DIAGNOSIS — S51.819A SUPERFICIAL LACERATION OF FOREARM: ICD-10-CM

## 2021-12-04 DIAGNOSIS — R45.851 SUICIDAL IDEATION: Primary | ICD-10-CM

## 2021-12-04 DIAGNOSIS — F31.4 BIPOLAR AFFECTIVE DISORDER, DEPRESSED, SEVERE (HCC): ICD-10-CM

## 2021-12-04 DIAGNOSIS — R45.88 NON-SUICIDAL SELF-HARM (HCC): ICD-10-CM

## 2021-12-04 LAB
ALBUMIN SERPL BCP-MCNC: 3.5 G/DL (ref 3.5–5)
ALP SERPL-CCNC: 59 U/L (ref 46–116)
ALT SERPL W P-5'-P-CCNC: 20 U/L (ref 12–78)
AMPHETAMINES SERPL QL SCN: NEGATIVE
ANION GAP SERPL CALCULATED.3IONS-SCNC: 7 MMOL/L (ref 4–13)
APAP SERPL-MCNC: <2 UG/ML (ref 10–20)
AST SERPL W P-5'-P-CCNC: 6 U/L (ref 5–45)
ATRIAL RATE: 76 BPM
BACTERIA UR QL AUTO: NORMAL /HPF
BARBITURATES UR QL: NEGATIVE
BASOPHILS # BLD AUTO: 0.05 THOUSANDS/ΜL (ref 0–0.1)
BASOPHILS NFR BLD AUTO: 1 % (ref 0–1)
BENZODIAZ UR QL: NEGATIVE
BILIRUB SERPL-MCNC: 0.4 MG/DL (ref 0.2–1)
BILIRUB UR QL STRIP: NEGATIVE
BUN SERPL-MCNC: 9 MG/DL (ref 5–25)
CALCIUM SERPL-MCNC: 8.4 MG/DL (ref 8.3–10.1)
CHLORIDE SERPL-SCNC: 100 MMOL/L (ref 100–108)
CLARITY UR: CLEAR
CO2 SERPL-SCNC: 33 MMOL/L (ref 21–32)
COCAINE UR QL: NEGATIVE
COLOR UR: YELLOW
CREAT SERPL-MCNC: 0.58 MG/DL (ref 0.6–1.3)
EOSINOPHIL # BLD AUTO: 0.11 THOUSAND/ΜL (ref 0–0.61)
EOSINOPHIL NFR BLD AUTO: 1 % (ref 0–6)
ERYTHROCYTE [DISTWIDTH] IN BLOOD BY AUTOMATED COUNT: 14.5 % (ref 11.6–15.1)
ETHANOL SERPL-MCNC: <3 MG/DL (ref 0–3)
FLUAV RNA RESP QL NAA+PROBE: NEGATIVE
FLUBV RNA RESP QL NAA+PROBE: NEGATIVE
GFR SERPL CREATININE-BSD FRML MDRD: 108 ML/MIN/1.73SQ M
GLUCOSE SERPL-MCNC: 77 MG/DL (ref 65–140)
GLUCOSE UR STRIP-MCNC: NEGATIVE MG/DL
HCT VFR BLD AUTO: 40.5 % (ref 34.8–46.1)
HGB BLD-MCNC: 13.3 G/DL (ref 11.5–15.4)
HGB UR QL STRIP.AUTO: NEGATIVE
IMM GRANULOCYTES # BLD AUTO: 0.09 THOUSAND/UL (ref 0–0.2)
IMM GRANULOCYTES NFR BLD AUTO: 1 % (ref 0–2)
KETONES UR STRIP-MCNC: ABNORMAL MG/DL
LEUKOCYTE ESTERASE UR QL STRIP: ABNORMAL
LYMPHOCYTES # BLD AUTO: 2.34 THOUSANDS/ΜL (ref 0.6–4.47)
LYMPHOCYTES NFR BLD AUTO: 27 % (ref 14–44)
MCH RBC QN AUTO: 31.6 PG (ref 26.8–34.3)
MCHC RBC AUTO-ENTMCNC: 32.8 G/DL (ref 31.4–37.4)
MCV RBC AUTO: 96 FL (ref 82–98)
METHADONE UR QL: NEGATIVE
MONOCYTES # BLD AUTO: 0.99 THOUSAND/ΜL (ref 0.17–1.22)
MONOCYTES NFR BLD AUTO: 11 % (ref 4–12)
NEUTROPHILS # BLD AUTO: 5.11 THOUSANDS/ΜL (ref 1.85–7.62)
NEUTS SEG NFR BLD AUTO: 59 % (ref 43–75)
NITRITE UR QL STRIP: NEGATIVE
NON-SQ EPI CELLS URNS QL MICRO: NORMAL /HPF
NRBC BLD AUTO-RTO: 0 /100 WBCS
OPIATES UR QL SCN: NEGATIVE
OXYCODONE+OXYMORPHONE UR QL SCN: NEGATIVE
P AXIS: 27 DEGREES
PCP UR QL: NEGATIVE
PH UR STRIP.AUTO: 6 [PH]
PLATELET # BLD AUTO: 212 THOUSANDS/UL (ref 149–390)
PMV BLD AUTO: 9.4 FL (ref 8.9–12.7)
POTASSIUM SERPL-SCNC: 3.9 MMOL/L (ref 3.5–5.3)
PR INTERVAL: 182 MS
PROT SERPL-MCNC: 6.9 G/DL (ref 6.4–8.2)
PROT UR STRIP-MCNC: NEGATIVE MG/DL
QRS AXIS: 27 DEGREES
QRSD INTERVAL: 88 MS
QT INTERVAL: 388 MS
QTC INTERVAL: 436 MS
RBC # BLD AUTO: 4.21 MILLION/UL (ref 3.81–5.12)
RBC #/AREA URNS AUTO: NORMAL /HPF
RSV RNA RESP QL NAA+PROBE: NEGATIVE
SALICYLATES SERPL-MCNC: <3 MG/DL (ref 3–20)
SARS-COV-2 RNA RESP QL NAA+PROBE: NEGATIVE
SODIUM SERPL-SCNC: 140 MMOL/L (ref 136–145)
SP GR UR STRIP.AUTO: 1.01 (ref 1–1.03)
T WAVE AXIS: 55 DEGREES
THC UR QL: NEGATIVE
TSH SERPL DL<=0.05 MIU/L-ACNC: 2.84 UIU/ML (ref 0.36–3.74)
UROBILINOGEN UR QL STRIP.AUTO: 4 E.U./DL
VALPROATE SERPL-MCNC: 101 UG/ML (ref 50–100)
VENTRICULAR RATE: 76 BPM
WBC # BLD AUTO: 8.69 THOUSAND/UL (ref 4.31–10.16)
WBC #/AREA URNS AUTO: NORMAL /HPF

## 2021-12-04 PROCEDURE — 80164 ASSAY DIPROPYLACETIC ACD TOT: CPT | Performed by: EMERGENCY MEDICINE

## 2021-12-04 PROCEDURE — 36415 COLL VENOUS BLD VENIPUNCTURE: CPT | Performed by: EMERGENCY MEDICINE

## 2021-12-04 PROCEDURE — 86592 SYPHILIS TEST NON-TREP QUAL: CPT | Performed by: PSYCHIATRY & NEUROLOGY

## 2021-12-04 PROCEDURE — 80053 COMPREHEN METABOLIC PANEL: CPT | Performed by: PSYCHIATRY & NEUROLOGY

## 2021-12-04 PROCEDURE — 81001 URINALYSIS AUTO W/SCOPE: CPT | Performed by: EMERGENCY MEDICINE

## 2021-12-04 PROCEDURE — 93010 ELECTROCARDIOGRAM REPORT: CPT | Performed by: INTERNAL MEDICINE

## 2021-12-04 PROCEDURE — 85025 COMPLETE CBC W/AUTO DIFF WBC: CPT | Performed by: EMERGENCY MEDICINE

## 2021-12-04 PROCEDURE — 99285 EMERGENCY DEPT VISIT HI MDM: CPT | Performed by: EMERGENCY MEDICINE

## 2021-12-04 PROCEDURE — 80143 DRUG ASSAY ACETAMINOPHEN: CPT | Performed by: EMERGENCY MEDICINE

## 2021-12-04 PROCEDURE — 82077 ASSAY SPEC XCP UR&BREATH IA: CPT | Performed by: EMERGENCY MEDICINE

## 2021-12-04 PROCEDURE — 93005 ELECTROCARDIOGRAM TRACING: CPT

## 2021-12-04 PROCEDURE — 80307 DRUG TEST PRSMV CHEM ANLYZR: CPT | Performed by: EMERGENCY MEDICINE

## 2021-12-04 PROCEDURE — 84703 CHORIONIC GONADOTROPIN ASSAY: CPT | Performed by: PSYCHIATRY & NEUROLOGY

## 2021-12-04 PROCEDURE — 80179 DRUG ASSAY SALICYLATE: CPT | Performed by: EMERGENCY MEDICINE

## 2021-12-04 PROCEDURE — 84443 ASSAY THYROID STIM HORMONE: CPT | Performed by: PSYCHIATRY & NEUROLOGY

## 2021-12-04 PROCEDURE — 0241U HB NFCT DS VIR RESP RNA 4 TRGT: CPT | Performed by: EMERGENCY MEDICINE

## 2021-12-04 PROCEDURE — 99285 EMERGENCY DEPT VISIT HI MDM: CPT

## 2021-12-04 PROCEDURE — 84443 ASSAY THYROID STIM HORMONE: CPT | Performed by: EMERGENCY MEDICINE

## 2021-12-04 PROCEDURE — 80053 COMPREHEN METABOLIC PANEL: CPT | Performed by: EMERGENCY MEDICINE

## 2021-12-04 RX ORDER — ATORVASTATIN CALCIUM 40 MG/1
40 TABLET, FILM COATED ORAL
Status: DISCONTINUED | OUTPATIENT
Start: 2021-12-05 | End: 2021-12-05 | Stop reason: HOSPADM

## 2021-12-04 RX ORDER — CALCIUM CARBONATE 500(1250)
1 TABLET ORAL
Status: DISCONTINUED | OUTPATIENT
Start: 2021-12-05 | End: 2021-12-05 | Stop reason: HOSPADM

## 2021-12-04 RX ORDER — OLANZAPINE 2.5 MG/1
5 TABLET ORAL
Status: CANCELLED | OUTPATIENT
Start: 2021-12-04

## 2021-12-04 RX ORDER — ASPIRIN 81 MG/1
81 TABLET ORAL DAILY
Status: DISCONTINUED | OUTPATIENT
Start: 2021-12-05 | End: 2021-12-05 | Stop reason: HOSPADM

## 2021-12-04 RX ORDER — CHOLECALCIFEROL (VITAMIN D3) 10(400)/ML
1000 DROPS ORAL DAILY
Status: CANCELLED | OUTPATIENT
Start: 2021-12-05

## 2021-12-04 RX ORDER — POLYETHYLENE GLYCOL 3350 17 G/17G
17 POWDER, FOR SOLUTION ORAL DAILY
Status: CANCELLED | OUTPATIENT
Start: 2021-12-05

## 2021-12-04 RX ORDER — OLANZAPINE 10 MG/1
5 INJECTION, POWDER, LYOPHILIZED, FOR SOLUTION INTRAMUSCULAR
Status: CANCELLED | OUTPATIENT
Start: 2021-12-04

## 2021-12-04 RX ORDER — OLANZAPINE 10 MG/1
10 INJECTION, POWDER, LYOPHILIZED, FOR SOLUTION INTRAMUSCULAR
Status: CANCELLED | OUTPATIENT
Start: 2021-12-04

## 2021-12-04 RX ORDER — MELATONIN
1000 DAILY
Status: DISCONTINUED | OUTPATIENT
Start: 2021-12-05 | End: 2021-12-05 | Stop reason: HOSPADM

## 2021-12-04 RX ORDER — LANOLIN ALCOHOL/MO/W.PET/CERES
3 CREAM (GRAM) TOPICAL
Status: CANCELLED | OUTPATIENT
Start: 2021-12-04

## 2021-12-04 RX ORDER — ASPIRIN 81 MG/1
81 TABLET ORAL DAILY
Status: CANCELLED | OUTPATIENT
Start: 2021-12-05

## 2021-12-04 RX ORDER — ATORVASTATIN CALCIUM 40 MG/1
40 TABLET, FILM COATED ORAL
Status: CANCELLED | OUTPATIENT
Start: 2021-12-04

## 2021-12-04 RX ORDER — FLUPHENAZINE HYDROCHLORIDE 5 MG/1
5 TABLET ORAL 2 TIMES DAILY
Status: DISCONTINUED | OUTPATIENT
Start: 2021-12-05 | End: 2021-12-05 | Stop reason: HOSPADM

## 2021-12-04 RX ORDER — FLUPHENAZINE HYDROCHLORIDE 5 MG/1
10 TABLET ORAL
Status: DISCONTINUED | OUTPATIENT
Start: 2021-12-04 | End: 2021-12-05 | Stop reason: HOSPADM

## 2021-12-04 RX ORDER — FLUPHENAZINE HYDROCHLORIDE 5 MG/1
5 TABLET ORAL DAILY
Status: CANCELLED | OUTPATIENT
Start: 2021-12-05

## 2021-12-04 RX ORDER — PANTOPRAZOLE SODIUM 40 MG/1
40 TABLET, DELAYED RELEASE ORAL
Status: CANCELLED | OUTPATIENT
Start: 2021-12-05

## 2021-12-04 RX ORDER — DIPHENHYDRAMINE HYDROCHLORIDE 50 MG/ML
50 INJECTION INTRAMUSCULAR; INTRAVENOUS EVERY 6 HOURS PRN
Status: CANCELLED | OUTPATIENT
Start: 2021-12-04

## 2021-12-04 RX ORDER — PROPRANOLOL HYDROCHLORIDE 20 MG/1
10 TABLET ORAL EVERY 8 HOURS PRN
Status: CANCELLED | OUTPATIENT
Start: 2021-12-04

## 2021-12-04 RX ORDER — CALCIUM CARBONATE 500(1250)
1 TABLET ORAL
Status: CANCELLED | OUTPATIENT
Start: 2021-12-05

## 2021-12-04 RX ORDER — BUPROPION HYDROCHLORIDE 300 MG/1
300 TABLET ORAL DAILY
Status: DISCONTINUED | OUTPATIENT
Start: 2021-12-05 | End: 2021-12-05 | Stop reason: HOSPADM

## 2021-12-04 RX ORDER — BENZTROPINE MESYLATE 1 MG/ML
1 INJECTION INTRAMUSCULAR; INTRAVENOUS
Status: CANCELLED | OUTPATIENT
Start: 2021-12-04

## 2021-12-04 RX ORDER — LISINOPRIL 5 MG/1
20 TABLET ORAL DAILY
Status: CANCELLED | OUTPATIENT
Start: 2021-12-05

## 2021-12-04 RX ORDER — DIVALPROEX SODIUM 500 MG/1
1000 TABLET, EXTENDED RELEASE ORAL
Status: CANCELLED | OUTPATIENT
Start: 2021-12-05

## 2021-12-04 RX ORDER — PRAZOSIN HYDROCHLORIDE 1 MG/1
2 CAPSULE ORAL
Status: DISCONTINUED | OUTPATIENT
Start: 2021-12-04 | End: 2021-12-05 | Stop reason: HOSPADM

## 2021-12-04 RX ORDER — FLUOXETINE 10 MG/1
40 CAPSULE ORAL DAILY
Status: DISCONTINUED | OUTPATIENT
Start: 2021-12-05 | End: 2021-12-05 | Stop reason: HOSPADM

## 2021-12-04 RX ORDER — IBUPROFEN 400 MG/1
400 TABLET ORAL EVERY 6 HOURS PRN
Status: CANCELLED | OUTPATIENT
Start: 2021-12-04

## 2021-12-04 RX ORDER — BACITRACIN, NEOMYCIN, POLYMYXIN B 400; 3.5; 5 [USP'U]/G; MG/G; [USP'U]/G
1 OINTMENT TOPICAL ONCE
Status: COMPLETED | OUTPATIENT
Start: 2021-12-04 | End: 2021-12-04

## 2021-12-04 RX ORDER — PANTOPRAZOLE SODIUM 40 MG/1
40 TABLET, DELAYED RELEASE ORAL
Status: DISCONTINUED | OUTPATIENT
Start: 2021-12-05 | End: 2021-12-05 | Stop reason: HOSPADM

## 2021-12-04 RX ORDER — BUPROPION HYDROCHLORIDE 300 MG/1
300 TABLET ORAL DAILY
Status: CANCELLED | OUTPATIENT
Start: 2021-12-05

## 2021-12-04 RX ORDER — FLUOXETINE HYDROCHLORIDE 20 MG/1
80 CAPSULE ORAL DAILY
Status: CANCELLED | OUTPATIENT
Start: 2021-12-05

## 2021-12-04 RX ORDER — OLANZAPINE 2.5 MG/1
10 TABLET ORAL
Status: CANCELLED | OUTPATIENT
Start: 2021-12-04

## 2021-12-04 RX ORDER — HYDROXYZINE HYDROCHLORIDE 25 MG/1
25 TABLET, FILM COATED ORAL
Status: CANCELLED | OUTPATIENT
Start: 2021-12-04

## 2021-12-04 RX ORDER — LISINOPRIL 5 MG/1
20 TABLET ORAL DAILY
Status: DISCONTINUED | OUTPATIENT
Start: 2021-12-05 | End: 2021-12-05 | Stop reason: HOSPADM

## 2021-12-04 RX ORDER — LORAZEPAM 2 MG/ML
2 INJECTION INTRAMUSCULAR EVERY 6 HOURS PRN
Status: CANCELLED | OUTPATIENT
Start: 2021-12-04

## 2021-12-04 RX ORDER — HYDROXYZINE HYDROCHLORIDE 25 MG/1
100 TABLET, FILM COATED ORAL
Status: CANCELLED | OUTPATIENT
Start: 2021-12-04

## 2021-12-04 RX ORDER — PRAZOSIN HYDROCHLORIDE 1 MG/1
2 CAPSULE ORAL
Status: CANCELLED | OUTPATIENT
Start: 2021-12-04

## 2021-12-04 RX ORDER — ACETAMINOPHEN 325 MG/1
650 TABLET ORAL EVERY 6 HOURS PRN
Status: CANCELLED | OUTPATIENT
Start: 2021-12-04

## 2021-12-04 RX ORDER — IBUPROFEN 600 MG/1
600 TABLET ORAL EVERY 8 HOURS PRN
Status: CANCELLED | OUTPATIENT
Start: 2021-12-04

## 2021-12-04 RX ORDER — BENZTROPINE MESYLATE 0.5 MG/1
1 TABLET ORAL
Status: CANCELLED | OUTPATIENT
Start: 2021-12-04

## 2021-12-04 RX ORDER — HYDROXYZINE HYDROCHLORIDE 25 MG/1
50 TABLET, FILM COATED ORAL
Status: CANCELLED | OUTPATIENT
Start: 2021-12-04

## 2021-12-04 RX ORDER — FLUPHENAZINE HYDROCHLORIDE 5 MG/1
10 TABLET ORAL
Status: CANCELLED | OUTPATIENT
Start: 2021-12-04

## 2021-12-04 RX ORDER — OLANZAPINE 2.5 MG/1
2.5 TABLET ORAL
Status: CANCELLED | OUTPATIENT
Start: 2021-12-04

## 2021-12-04 RX ADMIN — BACITRACIN, NEOMYCIN, POLYMYXIN B 1 SMALL APPLICATION: 400; 3.5; 5 OINTMENT TOPICAL at 16:49

## 2021-12-04 RX ADMIN — PRAZOSIN HYDROCHLORIDE 2 MG: 1 CAPSULE ORAL at 23:12

## 2021-12-04 RX ADMIN — FLUPHENAZINE HYDROCHLORIDE 10 MG: 5 TABLET, FILM COATED ORAL at 23:14

## 2021-12-05 ENCOUNTER — HOSPITAL ENCOUNTER (INPATIENT)
Facility: HOSPITAL | Age: 51
LOS: 3 days | Discharge: HOME | DRG: 885 | End: 2021-12-08
Attending: STUDENT IN AN ORGANIZED HEALTH CARE EDUCATION/TRAINING PROGRAM | Admitting: PSYCHIATRY & NEUROLOGY
Payer: MEDICARE

## 2021-12-05 VITALS
OXYGEN SATURATION: 96 % | SYSTOLIC BLOOD PRESSURE: 132 MMHG | BODY MASS INDEX: 46.1 KG/M2 | HEIGHT: 64 IN | DIASTOLIC BLOOD PRESSURE: 68 MMHG | WEIGHT: 270 LBS | HEART RATE: 71 BPM | RESPIRATION RATE: 18 BRPM | TEMPERATURE: 97.8 F

## 2021-12-05 DIAGNOSIS — K59.00 CONSTIPATION: ICD-10-CM

## 2021-12-05 DIAGNOSIS — F31.4 BIPOLAR AFFECTIVE DISORDER, DEPRESSED, SEVERE (HCC): Primary | ICD-10-CM

## 2021-12-05 DIAGNOSIS — I10 ESSENTIAL HYPERTENSION: ICD-10-CM

## 2021-12-05 DIAGNOSIS — E78.5 HYPERLIPIDEMIA, UNSPECIFIED HYPERLIPIDEMIA TYPE: ICD-10-CM

## 2021-12-05 DIAGNOSIS — F43.10 PTSD (POST-TRAUMATIC STRESS DISORDER): ICD-10-CM

## 2021-12-05 DIAGNOSIS — E55.9 VITAMIN D DEFICIENCY: ICD-10-CM

## 2021-12-05 DIAGNOSIS — K21.9 GERD (GASTROESOPHAGEAL REFLUX DISEASE): ICD-10-CM

## 2021-12-05 LAB
ALBUMIN SERPL BCP-MCNC: 3.5 G/DL (ref 3.5–5)
ALP SERPL-CCNC: 59 U/L (ref 46–116)
ALT SERPL W P-5'-P-CCNC: 25 U/L (ref 12–78)
ANION GAP SERPL CALCULATED.3IONS-SCNC: 15 MMOL/L (ref 4–13)
AST SERPL W P-5'-P-CCNC: 11 U/L (ref 5–45)
BILIRUB SERPL-MCNC: 0.4 MG/DL (ref 0.2–1)
BUN SERPL-MCNC: 9 MG/DL (ref 5–25)
CALCIUM SERPL-MCNC: 8.7 MG/DL (ref 8.3–10.1)
CHLORIDE SERPL-SCNC: 102 MMOL/L (ref 100–108)
CO2 SERPL-SCNC: 25 MMOL/L (ref 21–32)
CREAT SERPL-MCNC: 0.69 MG/DL (ref 0.6–1.3)
GFR SERPL CREATININE-BSD FRML MDRD: 102 ML/MIN/1.73SQ M
GLUCOSE SERPL-MCNC: 75 MG/DL (ref 65–140)
HCG SERPL QL: NEGATIVE
POTASSIUM SERPL-SCNC: 4 MMOL/L (ref 3.5–5.3)
PROT SERPL-MCNC: 7.4 G/DL (ref 6.4–8.2)
SODIUM SERPL-SCNC: 142 MMOL/L (ref 136–145)
TSH SERPL DL<=0.05 MIU/L-ACNC: 2.99 UIU/ML (ref 0.36–3.74)

## 2021-12-05 PROCEDURE — 99222 1ST HOSP IP/OBS MODERATE 55: CPT | Performed by: STUDENT IN AN ORGANIZED HEALTH CARE EDUCATION/TRAINING PROGRAM

## 2021-12-05 RX ORDER — BUPROPION HYDROCHLORIDE 300 MG/1
300 TABLET ORAL DAILY
Status: DISCONTINUED | OUTPATIENT
Start: 2021-12-06 | End: 2021-12-08 | Stop reason: HOSPADM

## 2021-12-05 RX ORDER — POLYETHYLENE GLYCOL 3350 17 G/17G
17 POWDER, FOR SOLUTION ORAL DAILY
Status: DISCONTINUED | OUTPATIENT
Start: 2021-12-06 | End: 2021-12-08 | Stop reason: HOSPADM

## 2021-12-05 RX ORDER — PANTOPRAZOLE SODIUM 40 MG/1
40 TABLET, DELAYED RELEASE ORAL
Status: DISCONTINUED | OUTPATIENT
Start: 2021-12-05 | End: 2021-12-05

## 2021-12-05 RX ORDER — OLANZAPINE 2.5 MG/1
2.5 TABLET ORAL
Status: DISCONTINUED | OUTPATIENT
Start: 2021-12-05 | End: 2021-12-08 | Stop reason: HOSPADM

## 2021-12-05 RX ORDER — ASPIRIN 81 MG/1
81 TABLET ORAL DAILY
Status: DISCONTINUED | OUTPATIENT
Start: 2021-12-05 | End: 2021-12-05

## 2021-12-05 RX ORDER — HYDROXYZINE 50 MG/1
100 TABLET, FILM COATED ORAL
Status: DISCONTINUED | OUTPATIENT
Start: 2021-12-05 | End: 2021-12-08 | Stop reason: HOSPADM

## 2021-12-05 RX ORDER — ACETAMINOPHEN 325 MG/1
650 TABLET ORAL EVERY 6 HOURS PRN
Status: DISCONTINUED | OUTPATIENT
Start: 2021-12-05 | End: 2021-12-08 | Stop reason: HOSPADM

## 2021-12-05 RX ORDER — FLUOXETINE HYDROCHLORIDE 20 MG/1
80 CAPSULE ORAL DAILY
Status: DISCONTINUED | OUTPATIENT
Start: 2021-12-05 | End: 2021-12-05

## 2021-12-05 RX ORDER — OLANZAPINE 10 MG/1
5 INJECTION, POWDER, LYOPHILIZED, FOR SOLUTION INTRAMUSCULAR
Status: DISCONTINUED | OUTPATIENT
Start: 2021-12-05 | End: 2021-12-08 | Stop reason: HOSPADM

## 2021-12-05 RX ORDER — HYDROXYZINE HYDROCHLORIDE 25 MG/1
25 TABLET, FILM COATED ORAL
Status: DISCONTINUED | OUTPATIENT
Start: 2021-12-05 | End: 2021-12-08 | Stop reason: HOSPADM

## 2021-12-05 RX ORDER — POLYETHYLENE GLYCOL 3350 17 G/17G
17 POWDER, FOR SOLUTION ORAL DAILY
Status: DISCONTINUED | OUTPATIENT
Start: 2021-12-05 | End: 2021-12-05

## 2021-12-05 RX ORDER — OLANZAPINE 10 MG/1
10 TABLET ORAL
Status: DISCONTINUED | OUTPATIENT
Start: 2021-12-05 | End: 2021-12-08 | Stop reason: HOSPADM

## 2021-12-05 RX ORDER — IBUPROFEN 400 MG/1
400 TABLET ORAL EVERY 6 HOURS PRN
Status: DISCONTINUED | OUTPATIENT
Start: 2021-12-05 | End: 2021-12-08 | Stop reason: HOSPADM

## 2021-12-05 RX ORDER — LORAZEPAM 2 MG/ML
2 INJECTION INTRAMUSCULAR EVERY 6 HOURS PRN
Status: DISCONTINUED | OUTPATIENT
Start: 2021-12-05 | End: 2021-12-08 | Stop reason: HOSPADM

## 2021-12-05 RX ORDER — LISINOPRIL 20 MG/1
20 TABLET ORAL DAILY
Status: DISCONTINUED | OUTPATIENT
Start: 2021-12-05 | End: 2021-12-05

## 2021-12-05 RX ORDER — FLUPHENAZINE HYDROCHLORIDE 5 MG/1
5 TABLET ORAL DAILY
Status: DISCONTINUED | OUTPATIENT
Start: 2021-12-05 | End: 2021-12-05

## 2021-12-05 RX ORDER — HYDROXYZINE 50 MG/1
50 TABLET, FILM COATED ORAL
Status: DISCONTINUED | OUTPATIENT
Start: 2021-12-05 | End: 2021-12-08 | Stop reason: HOSPADM

## 2021-12-05 RX ORDER — PROPRANOLOL HYDROCHLORIDE 10 MG/1
10 TABLET ORAL EVERY 8 HOURS PRN
Status: DISCONTINUED | OUTPATIENT
Start: 2021-12-05 | End: 2021-12-08 | Stop reason: HOSPADM

## 2021-12-05 RX ORDER — BUPROPION HYDROCHLORIDE 300 MG/1
300 TABLET ORAL DAILY
Status: DISCONTINUED | OUTPATIENT
Start: 2021-12-05 | End: 2021-12-05

## 2021-12-05 RX ORDER — DIPHENHYDRAMINE HYDROCHLORIDE 50 MG/ML
50 INJECTION INTRAMUSCULAR; INTRAVENOUS EVERY 6 HOURS PRN
Status: DISCONTINUED | OUTPATIENT
Start: 2021-12-05 | End: 2021-12-08 | Stop reason: HOSPADM

## 2021-12-05 RX ORDER — OLANZAPINE 10 MG/1
10 INJECTION, POWDER, LYOPHILIZED, FOR SOLUTION INTRAMUSCULAR
Status: DISCONTINUED | OUTPATIENT
Start: 2021-12-05 | End: 2021-12-08 | Stop reason: HOSPADM

## 2021-12-05 RX ORDER — ATORVASTATIN CALCIUM 40 MG/1
40 TABLET, FILM COATED ORAL
Status: DISCONTINUED | OUTPATIENT
Start: 2021-12-05 | End: 2021-12-08 | Stop reason: HOSPADM

## 2021-12-05 RX ORDER — FLUPHENAZINE HYDROCHLORIDE 5 MG/1
10 TABLET ORAL
Status: DISCONTINUED | OUTPATIENT
Start: 2021-12-05 | End: 2021-12-08 | Stop reason: HOSPADM

## 2021-12-05 RX ORDER — DIVALPROEX SODIUM 500 MG/1
1000 TABLET, EXTENDED RELEASE ORAL
Status: DISCONTINUED | OUTPATIENT
Start: 2021-12-05 | End: 2021-12-05

## 2021-12-05 RX ORDER — BENZTROPINE MESYLATE 1 MG/1
1 TABLET ORAL
Status: DISCONTINUED | OUTPATIENT
Start: 2021-12-05 | End: 2021-12-08 | Stop reason: HOSPADM

## 2021-12-05 RX ORDER — PRAZOSIN HYDROCHLORIDE 1 MG/1
2 CAPSULE ORAL
Status: DISCONTINUED | OUTPATIENT
Start: 2021-12-05 | End: 2021-12-08 | Stop reason: HOSPADM

## 2021-12-05 RX ORDER — FLUOXETINE HYDROCHLORIDE 20 MG/1
80 CAPSULE ORAL DAILY
Status: DISCONTINUED | OUTPATIENT
Start: 2021-12-06 | End: 2021-12-08 | Stop reason: HOSPADM

## 2021-12-05 RX ORDER — MELATONIN
1000 DAILY
Status: DISCONTINUED | OUTPATIENT
Start: 2021-12-05 | End: 2021-12-08 | Stop reason: HOSPADM

## 2021-12-05 RX ORDER — OLANZAPINE 5 MG/1
5 TABLET ORAL
Status: DISCONTINUED | OUTPATIENT
Start: 2021-12-05 | End: 2021-12-08 | Stop reason: HOSPADM

## 2021-12-05 RX ORDER — CALCIUM CARBONATE 500(1250)
1 TABLET ORAL
Status: DISCONTINUED | OUTPATIENT
Start: 2021-12-06 | End: 2021-12-08 | Stop reason: HOSPADM

## 2021-12-05 RX ORDER — ASPIRIN 81 MG/1
81 TABLET ORAL DAILY
Status: DISCONTINUED | OUTPATIENT
Start: 2021-12-06 | End: 2021-12-08 | Stop reason: HOSPADM

## 2021-12-05 RX ORDER — PANTOPRAZOLE SODIUM 40 MG/1
40 TABLET, DELAYED RELEASE ORAL
Status: DISCONTINUED | OUTPATIENT
Start: 2021-12-06 | End: 2021-12-08 | Stop reason: HOSPADM

## 2021-12-05 RX ORDER — LISINOPRIL 20 MG/1
20 TABLET ORAL DAILY
Status: DISCONTINUED | OUTPATIENT
Start: 2021-12-06 | End: 2021-12-08 | Stop reason: HOSPADM

## 2021-12-05 RX ORDER — FLUPHENAZINE HYDROCHLORIDE 5 MG/1
5 TABLET ORAL DAILY
Status: DISCONTINUED | OUTPATIENT
Start: 2021-12-06 | End: 2021-12-07

## 2021-12-05 RX ORDER — BENZTROPINE MESYLATE 1 MG/ML
1 INJECTION INTRAMUSCULAR; INTRAVENOUS
Status: DISCONTINUED | OUTPATIENT
Start: 2021-12-05 | End: 2021-12-08 | Stop reason: HOSPADM

## 2021-12-05 RX ORDER — FLUPHENAZINE HYDROCHLORIDE 5 MG/1
5 TABLET ORAL DAILY
Status: DISCONTINUED | OUTPATIENT
Start: 2021-12-05 | End: 2021-12-06

## 2021-12-05 RX ORDER — IBUPROFEN 600 MG/1
600 TABLET ORAL EVERY 8 HOURS PRN
Status: DISCONTINUED | OUTPATIENT
Start: 2021-12-05 | End: 2021-12-08 | Stop reason: HOSPADM

## 2021-12-05 RX ORDER — DIVALPROEX SODIUM 500 MG/1
2000 TABLET, EXTENDED RELEASE ORAL
Status: DISCONTINUED | OUTPATIENT
Start: 2021-12-05 | End: 2021-12-08 | Stop reason: HOSPADM

## 2021-12-05 RX ORDER — LANOLIN ALCOHOL/MO/W.PET/CERES
3 CREAM (GRAM) TOPICAL
Status: DISCONTINUED | OUTPATIENT
Start: 2021-12-05 | End: 2021-12-08 | Stop reason: HOSPADM

## 2021-12-05 RX ADMIN — PANTOPRAZOLE SODIUM 40 MG: 40 TABLET, DELAYED RELEASE ORAL at 08:06

## 2021-12-05 RX ADMIN — DIVALPROEX SODIUM 2000 MG: 500 TABLET, EXTENDED RELEASE ORAL at 21:21

## 2021-12-05 RX ADMIN — Medication 3 MG: at 21:22

## 2021-12-05 RX ADMIN — BUPROPION HYDROCHLORIDE 300 MG: 300 TABLET, FILM COATED, EXTENDED RELEASE ORAL at 08:05

## 2021-12-05 RX ADMIN — PRAZOSIN HYDROCHLORIDE 2 MG: 1 CAPSULE ORAL at 21:21

## 2021-12-05 RX ADMIN — Medication 1000 UNITS: at 08:06

## 2021-12-05 RX ADMIN — FLUPHENAZINE HYDROCHLORIDE 5 MG: 5 TABLET, FILM COATED ORAL at 08:05

## 2021-12-05 RX ADMIN — LISINOPRIL 20 MG: 5 TABLET ORAL at 08:05

## 2021-12-05 RX ADMIN — FLUPHENAZINE HYDROCHLORIDE 10 MG: 5 TABLET, FILM COATED ORAL at 21:21

## 2021-12-05 RX ADMIN — FLUPHENAZINE HYDROCHLORIDE 5 MG: 5 TABLET, FILM COATED ORAL at 13:03

## 2021-12-05 RX ADMIN — Medication 1000 UNITS: at 13:03

## 2021-12-05 RX ADMIN — FLUOXETINE 40 MG: 10 CAPSULE ORAL at 08:04

## 2021-12-05 RX ADMIN — CALCIUM 1 TABLET: 500 TABLET ORAL at 08:06

## 2021-12-05 RX ADMIN — ASPIRIN 81 MG: 81 TABLET, COATED ORAL at 08:04

## 2021-12-05 RX ADMIN — ATORVASTATIN CALCIUM 40 MG: 40 TABLET, FILM COATED ORAL at 17:12

## 2021-12-06 PROBLEM — R56.9 SEIZURES (HCC): Status: ACTIVE | Noted: 2021-12-06

## 2021-12-06 LAB — RPR SER QL: NORMAL

## 2021-12-06 PROCEDURE — 99232 SBSQ HOSP IP/OBS MODERATE 35: CPT | Performed by: PHYSICIAN ASSISTANT

## 2021-12-06 PROCEDURE — 99222 1ST HOSP IP/OBS MODERATE 55: CPT | Performed by: PHYSICIAN ASSISTANT

## 2021-12-06 PROCEDURE — 93005 ELECTROCARDIOGRAM TRACING: CPT

## 2021-12-06 RX ADMIN — LISINOPRIL 20 MG: 20 TABLET ORAL at 08:54

## 2021-12-06 RX ADMIN — PANTOPRAZOLE SODIUM 40 MG: 40 TABLET, DELAYED RELEASE ORAL at 06:01

## 2021-12-06 RX ADMIN — ASPIRIN 81 MG: 81 TABLET, COATED ORAL at 08:54

## 2021-12-06 RX ADMIN — PRAZOSIN HYDROCHLORIDE 2 MG: 1 CAPSULE ORAL at 21:36

## 2021-12-06 RX ADMIN — ATORVASTATIN CALCIUM 40 MG: 40 TABLET, FILM COATED ORAL at 15:55

## 2021-12-06 RX ADMIN — Medication 1000 UNITS: at 08:53

## 2021-12-06 RX ADMIN — FLUPHENAZINE HYDROCHLORIDE 10 MG: 5 TABLET, FILM COATED ORAL at 21:36

## 2021-12-06 RX ADMIN — CALCIUM 1 TABLET: 500 TABLET ORAL at 08:53

## 2021-12-06 RX ADMIN — Medication 3 MG: at 21:37

## 2021-12-06 RX ADMIN — FLUOXETINE 80 MG: 20 CAPSULE ORAL at 08:54

## 2021-12-06 RX ADMIN — BUPROPION HYDROCHLORIDE 300 MG: 300 TABLET, FILM COATED, EXTENDED RELEASE ORAL at 08:54

## 2021-12-06 RX ADMIN — DIVALPROEX SODIUM 2000 MG: 500 TABLET, EXTENDED RELEASE ORAL at 21:37

## 2021-12-06 RX ADMIN — FLUPHENAZINE HYDROCHLORIDE 5 MG: 5 TABLET, FILM COATED ORAL at 08:58

## 2021-12-07 PROBLEM — Z00.8 MEDICAL CLEARANCE FOR PSYCHIATRIC ADMISSION: Status: RESOLVED | Noted: 2021-01-09 | Resolved: 2021-12-07

## 2021-12-07 PROCEDURE — 99232 SBSQ HOSP IP/OBS MODERATE 35: CPT | Performed by: PHYSICIAN ASSISTANT

## 2021-12-07 PROCEDURE — 0241U HB NFCT DS VIR RESP RNA 4 TRGT: CPT | Performed by: PHYSICIAN ASSISTANT

## 2021-12-07 RX ORDER — LISINOPRIL 20 MG/1
20 TABLET ORAL DAILY
Qty: 30 TABLET | Refills: 0 | Status: ON HOLD | OUTPATIENT
Start: 2021-12-07 | End: 2022-01-05 | Stop reason: SDUPTHER

## 2021-12-07 RX ORDER — FLUPHENAZINE HYDROCHLORIDE 5 MG/1
5 TABLET ORAL 2 TIMES DAILY
Status: DISCONTINUED | OUTPATIENT
Start: 2021-12-07 | End: 2021-12-08 | Stop reason: HOSPADM

## 2021-12-07 RX ORDER — CALCIUM CARBONATE 500(1250)
1 TABLET ORAL
Qty: 30 TABLET | Refills: 0 | Status: SHIPPED | OUTPATIENT
Start: 2021-12-07 | End: 2022-01-06 | Stop reason: HOSPADM

## 2021-12-07 RX ORDER — PANTOPRAZOLE SODIUM 40 MG/1
40 TABLET, DELAYED RELEASE ORAL
Qty: 30 TABLET | Refills: 0 | Status: ON HOLD | OUTPATIENT
Start: 2021-12-07 | End: 2021-12-21 | Stop reason: ALTCHOICE

## 2021-12-07 RX ORDER — DIVALPROEX SODIUM 500 MG/1
2000 TABLET, EXTENDED RELEASE ORAL
Qty: 120 TABLET | Refills: 1 | Status: SHIPPED | OUTPATIENT
Start: 2021-12-07 | End: 2022-01-06 | Stop reason: HOSPADM

## 2021-12-07 RX ORDER — FLUOXETINE HYDROCHLORIDE 40 MG/1
80 CAPSULE ORAL DAILY
Qty: 60 CAPSULE | Refills: 1 | Status: SHIPPED | OUTPATIENT
Start: 2021-12-07 | End: 2022-01-06 | Stop reason: HOSPADM

## 2021-12-07 RX ORDER — POLYETHYLENE GLYCOL 3350 17 G/17G
17 POWDER, FOR SOLUTION ORAL DAILY
Qty: 510 G | Refills: 0 | Status: ON HOLD | OUTPATIENT
Start: 2021-12-07 | End: 2022-01-05 | Stop reason: SDUPTHER

## 2021-12-07 RX ORDER — PRAZOSIN HYDROCHLORIDE 2 MG/1
2 CAPSULE ORAL
Qty: 30 CAPSULE | Refills: 0 | Status: ON HOLD | OUTPATIENT
Start: 2021-12-07 | End: 2022-01-05 | Stop reason: SDUPTHER

## 2021-12-07 RX ORDER — BUPROPION HYDROCHLORIDE 300 MG/1
300 TABLET ORAL DAILY
Qty: 30 TABLET | Refills: 1 | Status: ON HOLD | OUTPATIENT
Start: 2021-12-07 | End: 2022-01-05 | Stop reason: SDUPTHER

## 2021-12-07 RX ORDER — ASPIRIN 81 MG/1
81 TABLET ORAL DAILY
Qty: 30 TABLET | Refills: 0 | Status: ON HOLD | OUTPATIENT
Start: 2021-12-08 | End: 2022-01-05 | Stop reason: SDUPTHER

## 2021-12-07 RX ORDER — ATORVASTATIN CALCIUM 40 MG/1
40 TABLET, FILM COATED ORAL
Qty: 30 TABLET | Refills: 0 | Status: ON HOLD | OUTPATIENT
Start: 2021-12-07 | End: 2022-01-05 | Stop reason: SDUPTHER

## 2021-12-07 RX ORDER — MELATONIN
1000 DAILY
Qty: 30 TABLET | Refills: 0 | Status: ON HOLD | OUTPATIENT
Start: 2021-12-08 | End: 2022-01-05 | Stop reason: SDUPTHER

## 2021-12-07 RX ORDER — FLUPHENAZINE HYDROCHLORIDE 5 MG/1
5 TABLET ORAL
Qty: 60 TABLET | Refills: 1 | Status: SHIPPED | OUTPATIENT
Start: 2021-12-07 | End: 2022-01-06 | Stop reason: HOSPADM

## 2021-12-07 RX ORDER — LANOLIN ALCOHOL/MO/W.PET/CERES
3 CREAM (GRAM) TOPICAL
Qty: 30 TABLET | Refills: 1 | Status: ON HOLD | OUTPATIENT
Start: 2021-12-07 | End: 2022-01-05 | Stop reason: SDUPTHER

## 2021-12-07 RX ORDER — FLUPHENAZINE HYDROCHLORIDE 10 MG/1
10 TABLET ORAL
Qty: 30 TABLET | Refills: 1 | Status: SHIPPED | OUTPATIENT
Start: 2021-12-07 | End: 2022-01-06 | Stop reason: HOSPADM

## 2021-12-07 RX ADMIN — RIVAROXABAN 10 MG: 10 TABLET, FILM COATED ORAL at 09:20

## 2021-12-07 RX ADMIN — POLYETHYLENE GLYCOL 3350 17 G: 17 POWDER, FOR SOLUTION ORAL at 09:21

## 2021-12-07 RX ADMIN — PRAZOSIN HYDROCHLORIDE 2 MG: 1 CAPSULE ORAL at 21:18

## 2021-12-07 RX ADMIN — PANTOPRAZOLE SODIUM 40 MG: 40 TABLET, DELAYED RELEASE ORAL at 06:35

## 2021-12-07 RX ADMIN — ATORVASTATIN CALCIUM 40 MG: 40 TABLET, FILM COATED ORAL at 16:21

## 2021-12-07 RX ADMIN — DIVALPROEX SODIUM 2000 MG: 500 TABLET, EXTENDED RELEASE ORAL at 22:08

## 2021-12-07 RX ADMIN — LISINOPRIL 20 MG: 20 TABLET ORAL at 09:21

## 2021-12-07 RX ADMIN — FLUPHENAZINE HYDROCHLORIDE 10 MG: 5 TABLET, FILM COATED ORAL at 21:18

## 2021-12-07 RX ADMIN — FLUOXETINE 80 MG: 20 CAPSULE ORAL at 09:19

## 2021-12-07 RX ADMIN — BUPROPION HYDROCHLORIDE 300 MG: 300 TABLET, FILM COATED, EXTENDED RELEASE ORAL at 09:20

## 2021-12-07 RX ADMIN — FLUPHENAZINE HYDROCHLORIDE 5 MG: 5 TABLET, FILM COATED ORAL at 09:20

## 2021-12-07 RX ADMIN — ASPIRIN 81 MG: 81 TABLET, COATED ORAL at 09:20

## 2021-12-07 RX ADMIN — Medication 3 MG: at 21:18

## 2021-12-07 RX ADMIN — Medication 1000 UNITS: at 09:19

## 2021-12-07 RX ADMIN — FLUPHENAZINE HYDROCHLORIDE 5 MG: 5 TABLET, FILM COATED ORAL at 15:39

## 2021-12-07 RX ADMIN — CALCIUM 1 TABLET: 500 TABLET ORAL at 09:20

## 2021-12-08 VITALS
BODY MASS INDEX: 45.27 KG/M2 | OXYGEN SATURATION: 96 % | TEMPERATURE: 98.5 F | RESPIRATION RATE: 16 BRPM | DIASTOLIC BLOOD PRESSURE: 56 MMHG | HEART RATE: 73 BPM | WEIGHT: 265.2 LBS | HEIGHT: 64 IN | SYSTOLIC BLOOD PRESSURE: 105 MMHG

## 2021-12-08 PROCEDURE — 99239 HOSP IP/OBS DSCHRG MGMT >30: CPT | Performed by: PHYSICIAN ASSISTANT

## 2021-12-08 RX ADMIN — Medication 1000 UNITS: at 08:59

## 2021-12-08 RX ADMIN — CALCIUM 1 TABLET: 500 TABLET ORAL at 08:59

## 2021-12-08 RX ADMIN — BUPROPION HYDROCHLORIDE 300 MG: 300 TABLET, FILM COATED, EXTENDED RELEASE ORAL at 08:59

## 2021-12-08 RX ADMIN — FLUPHENAZINE HYDROCHLORIDE 5 MG: 5 TABLET, FILM COATED ORAL at 08:59

## 2021-12-08 RX ADMIN — POLYETHYLENE GLYCOL 3350 17 G: 17 POWDER, FOR SOLUTION ORAL at 08:59

## 2021-12-08 RX ADMIN — PANTOPRAZOLE SODIUM 40 MG: 40 TABLET, DELAYED RELEASE ORAL at 06:02

## 2021-12-08 RX ADMIN — LISINOPRIL 20 MG: 20 TABLET ORAL at 08:59

## 2021-12-08 RX ADMIN — FLUOXETINE 80 MG: 20 CAPSULE ORAL at 08:59

## 2021-12-08 RX ADMIN — ASPIRIN 81 MG: 81 TABLET, COATED ORAL at 08:59

## 2021-12-08 RX ADMIN — RIVAROXABAN 10 MG: 10 TABLET, FILM COATED ORAL at 08:59

## 2021-12-12 LAB
ATRIAL RATE: 72 BPM
P AXIS: 38 DEGREES
PR INTERVAL: 136 MS
QRS AXIS: 32 DEGREES
QRSD INTERVAL: 90 MS
QT INTERVAL: 376 MS
QTC INTERVAL: 411 MS
T WAVE AXIS: 53 DEGREES
VENTRICULAR RATE: 72 BPM

## 2021-12-12 PROCEDURE — 93010 ELECTROCARDIOGRAM REPORT: CPT | Performed by: INTERNAL MEDICINE

## 2021-12-17 ENCOUNTER — OFFICE VISIT (OUTPATIENT)
Dept: OBGYN CLINIC | Facility: CLINIC | Age: 51
End: 2021-12-17
Payer: MEDICARE

## 2021-12-17 ENCOUNTER — APPOINTMENT (OUTPATIENT)
Dept: RADIOLOGY | Facility: CLINIC | Age: 51
End: 2021-12-17
Payer: MEDICARE

## 2021-12-17 ENCOUNTER — HOSPITAL ENCOUNTER (EMERGENCY)
Facility: HOSPITAL | Age: 51
End: 2021-12-18
Attending: EMERGENCY MEDICINE | Admitting: EMERGENCY MEDICINE
Payer: MEDICARE

## 2021-12-17 VITALS
DIASTOLIC BLOOD PRESSURE: 72 MMHG | OXYGEN SATURATION: 99 % | WEIGHT: 265 LBS | BODY MASS INDEX: 45.24 KG/M2 | RESPIRATION RATE: 20 BRPM | SYSTOLIC BLOOD PRESSURE: 138 MMHG | HEIGHT: 64 IN | HEART RATE: 80 BPM | TEMPERATURE: 98.2 F

## 2021-12-17 VITALS — HEIGHT: 64 IN | WEIGHT: 265 LBS | BODY MASS INDEX: 45.24 KG/M2

## 2021-12-17 DIAGNOSIS — S92.352A CLOSED DISPLACED FRACTURE OF FIFTH METATARSAL BONE OF LEFT FOOT, INITIAL ENCOUNTER: Primary | ICD-10-CM

## 2021-12-17 DIAGNOSIS — R45.851 SUICIDAL IDEATION: Primary | ICD-10-CM

## 2021-12-17 DIAGNOSIS — F43.10 PTSD (POST-TRAUMATIC STRESS DISORDER): ICD-10-CM

## 2021-12-17 DIAGNOSIS — S92.352A CLOSED DISPLACED FRACTURE OF FIFTH METATARSAL BONE OF LEFT FOOT, INITIAL ENCOUNTER: ICD-10-CM

## 2021-12-17 LAB
AMPHETAMINES SERPL QL SCN: NEGATIVE
ANION GAP SERPL CALCULATED.3IONS-SCNC: 6 MMOL/L (ref 4–13)
BARBITURATES UR QL: NEGATIVE
BASOPHILS # BLD AUTO: 0.04 THOUSANDS/ΜL (ref 0–0.1)
BASOPHILS NFR BLD AUTO: 1 % (ref 0–1)
BENZODIAZ UR QL: NEGATIVE
BUN SERPL-MCNC: 7 MG/DL (ref 5–25)
CALCIUM SERPL-MCNC: 8.6 MG/DL (ref 8.3–10.1)
CHLORIDE SERPL-SCNC: 104 MMOL/L (ref 100–108)
CO2 SERPL-SCNC: 29 MMOL/L (ref 21–32)
COCAINE UR QL: NEGATIVE
CREAT SERPL-MCNC: 0.69 MG/DL (ref 0.6–1.3)
EOSINOPHIL # BLD AUTO: 0.16 THOUSAND/ΜL (ref 0–0.61)
EOSINOPHIL NFR BLD AUTO: 2 % (ref 0–6)
ERYTHROCYTE [DISTWIDTH] IN BLOOD BY AUTOMATED COUNT: 14.5 % (ref 11.6–15.1)
ETHANOL EXG-MCNC: 0 MG/DL
EXT PREG TEST URINE: NEGATIVE
EXT. CONTROL ED NAV: NORMAL
FLUAV RNA RESP QL NAA+PROBE: NEGATIVE
FLUBV RNA RESP QL NAA+PROBE: NEGATIVE
GFR SERPL CREATININE-BSD FRML MDRD: 101 ML/MIN/1.73SQ M
GLUCOSE SERPL-MCNC: 97 MG/DL (ref 65–140)
HCT VFR BLD AUTO: 35.5 % (ref 34.8–46.1)
HGB BLD-MCNC: 11.7 G/DL (ref 11.5–15.4)
IMM GRANULOCYTES # BLD AUTO: 0.12 THOUSAND/UL (ref 0–0.2)
IMM GRANULOCYTES NFR BLD AUTO: 2 % (ref 0–2)
LYMPHOCYTES # BLD AUTO: 1.41 THOUSANDS/ΜL (ref 0.6–4.47)
LYMPHOCYTES NFR BLD AUTO: 18 % (ref 14–44)
MCH RBC QN AUTO: 32 PG (ref 26.8–34.3)
MCHC RBC AUTO-ENTMCNC: 33 G/DL (ref 31.4–37.4)
MCV RBC AUTO: 97 FL (ref 82–98)
METHADONE UR QL: NEGATIVE
MONOCYTES # BLD AUTO: 0.84 THOUSAND/ΜL (ref 0.17–1.22)
MONOCYTES NFR BLD AUTO: 10 % (ref 4–12)
NEUTROPHILS # BLD AUTO: 5.47 THOUSANDS/ΜL (ref 1.85–7.62)
NEUTS SEG NFR BLD AUTO: 67 % (ref 43–75)
NRBC BLD AUTO-RTO: 0 /100 WBCS
OPIATES UR QL SCN: NEGATIVE
OXYCODONE+OXYMORPHONE UR QL SCN: NEGATIVE
PCP UR QL: NEGATIVE
PLATELET # BLD AUTO: 186 THOUSANDS/UL (ref 149–390)
PMV BLD AUTO: 9.4 FL (ref 8.9–12.7)
POTASSIUM SERPL-SCNC: 3.8 MMOL/L (ref 3.5–5.3)
RBC # BLD AUTO: 3.66 MILLION/UL (ref 3.81–5.12)
RSV RNA RESP QL NAA+PROBE: NEGATIVE
SARS-COV-2 RNA RESP QL NAA+PROBE: NEGATIVE
SODIUM SERPL-SCNC: 139 MMOL/L (ref 136–145)
THC UR QL: NEGATIVE
TSH SERPL DL<=0.05 MIU/L-ACNC: 1.85 UIU/ML (ref 0.36–3.74)
WBC # BLD AUTO: 8.04 THOUSAND/UL (ref 4.31–10.16)

## 2021-12-17 PROCEDURE — 0241U HB NFCT DS VIR RESP RNA 4 TRGT: CPT | Performed by: EMERGENCY MEDICINE

## 2021-12-17 PROCEDURE — 81025 URINE PREGNANCY TEST: CPT

## 2021-12-17 PROCEDURE — 80307 DRUG TEST PRSMV CHEM ANLYZR: CPT

## 2021-12-17 PROCEDURE — 84443 ASSAY THYROID STIM HORMONE: CPT | Performed by: EMERGENCY MEDICINE

## 2021-12-17 PROCEDURE — 36415 COLL VENOUS BLD VENIPUNCTURE: CPT | Performed by: EMERGENCY MEDICINE

## 2021-12-17 PROCEDURE — 99213 OFFICE O/P EST LOW 20 MIN: CPT | Performed by: ORTHOPAEDIC SURGERY

## 2021-12-17 PROCEDURE — 99285 EMERGENCY DEPT VISIT HI MDM: CPT

## 2021-12-17 PROCEDURE — 73630 X-RAY EXAM OF FOOT: CPT

## 2021-12-17 PROCEDURE — 80048 BASIC METABOLIC PNL TOTAL CA: CPT | Performed by: EMERGENCY MEDICINE

## 2021-12-17 PROCEDURE — 99285 EMERGENCY DEPT VISIT HI MDM: CPT | Performed by: EMERGENCY MEDICINE

## 2021-12-17 PROCEDURE — 82075 ASSAY OF BREATH ETHANOL: CPT

## 2021-12-17 PROCEDURE — 85025 COMPLETE CBC W/AUTO DIFF WBC: CPT | Performed by: EMERGENCY MEDICINE

## 2021-12-17 RX ORDER — MAGNESIUM HYDROXIDE/ALUMINUM HYDROXICE/SIMETHICONE 120; 1200; 1200 MG/30ML; MG/30ML; MG/30ML
30 SUSPENSION ORAL EVERY 4 HOURS PRN
Status: CANCELLED | OUTPATIENT
Start: 2021-12-17

## 2021-12-17 RX ORDER — BISACODYL 10 MG
10 SUPPOSITORY, RECTAL RECTAL DAILY PRN
Status: CANCELLED | OUTPATIENT
Start: 2021-12-17

## 2021-12-17 RX ORDER — FLUPHENAZINE HYDROCHLORIDE 5 MG/1
5 TABLET ORAL
Status: DISCONTINUED | OUTPATIENT
Start: 2021-12-18 | End: 2021-12-18 | Stop reason: HOSPADM

## 2021-12-17 RX ORDER — LANOLIN ALCOHOL/MO/W.PET/CERES
3 CREAM (GRAM) TOPICAL
Status: DISCONTINUED | OUTPATIENT
Start: 2021-12-17 | End: 2021-12-18 | Stop reason: HOSPADM

## 2021-12-17 RX ORDER — ACETAMINOPHEN 325 MG/1
650 TABLET ORAL EVERY 6 HOURS PRN
Status: CANCELLED | OUTPATIENT
Start: 2021-12-17

## 2021-12-17 RX ORDER — ATORVASTATIN CALCIUM 40 MG/1
40 TABLET, FILM COATED ORAL
Status: DISCONTINUED | OUTPATIENT
Start: 2021-12-18 | End: 2021-12-18 | Stop reason: HOSPADM

## 2021-12-17 RX ORDER — FLUPHENAZINE HYDROCHLORIDE 5 MG/1
10 TABLET ORAL
Status: DISCONTINUED | OUTPATIENT
Start: 2021-12-17 | End: 2021-12-18 | Stop reason: HOSPADM

## 2021-12-17 RX ORDER — HALOPERIDOL 5 MG/ML
5 INJECTION INTRAMUSCULAR
Status: CANCELLED | OUTPATIENT
Start: 2021-12-17

## 2021-12-17 RX ORDER — BENZTROPINE MESYLATE 0.5 MG/1
1 TABLET ORAL
Status: CANCELLED | OUTPATIENT
Start: 2021-12-17

## 2021-12-17 RX ORDER — HYDROXYZINE HYDROCHLORIDE 25 MG/1
50 TABLET, FILM COATED ORAL
Status: CANCELLED | OUTPATIENT
Start: 2021-12-17

## 2021-12-17 RX ORDER — HALOPERIDOL 1 MG/1
1 TABLET ORAL EVERY 6 HOURS PRN
Status: CANCELLED | OUTPATIENT
Start: 2021-12-17

## 2021-12-17 RX ORDER — FLUOXETINE 10 MG/1
40 CAPSULE ORAL 2 TIMES DAILY
Status: DISCONTINUED | OUTPATIENT
Start: 2021-12-17 | End: 2021-12-18 | Stop reason: HOSPADM

## 2021-12-17 RX ORDER — BUPROPION HYDROCHLORIDE 300 MG/1
300 TABLET ORAL DAILY
Status: DISCONTINUED | OUTPATIENT
Start: 2021-12-18 | End: 2021-12-18 | Stop reason: HOSPADM

## 2021-12-17 RX ORDER — PANTOPRAZOLE SODIUM 40 MG/1
40 TABLET, DELAYED RELEASE ORAL
Status: DISCONTINUED | OUTPATIENT
Start: 2021-12-18 | End: 2021-12-18 | Stop reason: HOSPADM

## 2021-12-17 RX ORDER — BENZTROPINE MESYLATE 1 MG/ML
0.5 INJECTION INTRAMUSCULAR; INTRAVENOUS
Status: CANCELLED | OUTPATIENT
Start: 2021-12-17

## 2021-12-17 RX ORDER — LISINOPRIL 20 MG/1
20 TABLET ORAL DAILY
Status: DISCONTINUED | OUTPATIENT
Start: 2021-12-18 | End: 2021-12-18 | Stop reason: HOSPADM

## 2021-12-17 RX ORDER — LANOLIN ALCOHOL/MO/W.PET/CERES
3 CREAM (GRAM) TOPICAL
Status: CANCELLED | OUTPATIENT
Start: 2021-12-17

## 2021-12-17 RX ORDER — ASPIRIN 81 MG/1
81 TABLET, CHEWABLE ORAL DAILY
Status: DISCONTINUED | OUTPATIENT
Start: 2021-12-18 | End: 2021-12-18 | Stop reason: HOSPADM

## 2021-12-17 RX ORDER — ACETAMINOPHEN 325 MG/1
975 TABLET ORAL EVERY 6 HOURS PRN
Status: CANCELLED | OUTPATIENT
Start: 2021-12-17

## 2021-12-17 RX ORDER — HALOPERIDOL 5 MG/ML
2.5 INJECTION INTRAMUSCULAR
Status: CANCELLED | OUTPATIENT
Start: 2021-12-17

## 2021-12-17 RX ORDER — HYDROXYZINE HYDROCHLORIDE 25 MG/1
100 TABLET, FILM COATED ORAL
Status: CANCELLED | OUTPATIENT
Start: 2021-12-17

## 2021-12-17 RX ORDER — TRAZODONE HYDROCHLORIDE 50 MG/1
50 TABLET ORAL
Status: CANCELLED | OUTPATIENT
Start: 2021-12-17

## 2021-12-17 RX ORDER — HYDROXYZINE HYDROCHLORIDE 25 MG/1
25 TABLET, FILM COATED ORAL
Status: CANCELLED | OUTPATIENT
Start: 2021-12-17

## 2021-12-17 RX ORDER — PRAZOSIN HYDROCHLORIDE 1 MG/1
2 CAPSULE ORAL
Status: DISCONTINUED | OUTPATIENT
Start: 2021-12-17 | End: 2021-12-18 | Stop reason: HOSPADM

## 2021-12-17 RX ORDER — BENZTROPINE MESYLATE 1 MG/ML
1 INJECTION INTRAMUSCULAR; INTRAVENOUS
Status: CANCELLED | OUTPATIENT
Start: 2021-12-17

## 2021-12-17 RX ORDER — POLYETHYLENE GLYCOL 3350 17 G/17G
17 POWDER, FOR SOLUTION ORAL DAILY PRN
Status: CANCELLED | OUTPATIENT
Start: 2021-12-17

## 2021-12-17 RX ORDER — LORAZEPAM 2 MG/ML
1 INJECTION INTRAMUSCULAR
Status: CANCELLED | OUTPATIENT
Start: 2021-12-17

## 2021-12-17 RX ORDER — ACETAMINOPHEN 325 MG/1
650 TABLET ORAL EVERY 4 HOURS PRN
Status: CANCELLED | OUTPATIENT
Start: 2021-12-17

## 2021-12-17 RX ORDER — MINERAL OIL AND PETROLATUM 150; 830 MG/G; MG/G
1 OINTMENT OPHTHALMIC
Status: CANCELLED | OUTPATIENT
Start: 2021-12-17

## 2021-12-17 RX ORDER — HALOPERIDOL 5 MG
5 TABLET ORAL
Status: CANCELLED | OUTPATIENT
Start: 2021-12-17

## 2021-12-17 RX ORDER — LORAZEPAM 2 MG/ML
2 INJECTION INTRAMUSCULAR EVERY 6 HOURS PRN
Status: CANCELLED | OUTPATIENT
Start: 2021-12-17

## 2021-12-17 RX ORDER — DIPHENHYDRAMINE HYDROCHLORIDE 50 MG/ML
50 INJECTION INTRAMUSCULAR; INTRAVENOUS EVERY 6 HOURS PRN
Status: CANCELLED | OUTPATIENT
Start: 2021-12-17

## 2021-12-17 RX ORDER — AMOXICILLIN 250 MG
1 CAPSULE ORAL DAILY PRN
Status: CANCELLED | OUTPATIENT
Start: 2021-12-17

## 2021-12-17 RX ORDER — DIVALPROEX SODIUM 500 MG/1
500 TABLET, EXTENDED RELEASE ORAL DAILY
Status: DISCONTINUED | OUTPATIENT
Start: 2021-12-18 | End: 2021-12-18 | Stop reason: HOSPADM

## 2021-12-17 RX ORDER — LORAZEPAM 2 MG/ML
2 INJECTION INTRAMUSCULAR
Status: CANCELLED | OUTPATIENT
Start: 2021-12-17

## 2021-12-17 RX ORDER — POLYETHYLENE GLYCOL 3350 17 G/17G
17 POWDER, FOR SOLUTION ORAL DAILY
Status: DISCONTINUED | OUTPATIENT
Start: 2021-12-18 | End: 2021-12-18 | Stop reason: HOSPADM

## 2021-12-17 RX ADMIN — Medication 3 MG: at 23:46

## 2021-12-17 RX ADMIN — PRAZOSIN HYDROCHLORIDE 2 MG: 1 CAPSULE ORAL at 23:46

## 2021-12-17 RX ADMIN — FLUOXETINE 40 MG: 10 CAPSULE ORAL at 23:46

## 2021-12-17 RX ADMIN — FLUPHENAZINE HYDROCHLORIDE 10 MG: 5 TABLET, FILM COATED ORAL at 23:46

## 2021-12-18 ENCOUNTER — HOSPITAL ENCOUNTER (INPATIENT)
Facility: HOSPITAL | Age: 51
LOS: 4 days | Discharge: HOME | DRG: 885 | End: 2021-12-22
Attending: STUDENT IN AN ORGANIZED HEALTH CARE EDUCATION/TRAINING PROGRAM | Admitting: PSYCHIATRY & NEUROLOGY
Payer: MEDICARE

## 2021-12-18 DIAGNOSIS — F31.4 BIPOLAR AFFECTIVE DISORDER, DEPRESSED, SEVERE (HCC): Primary | ICD-10-CM

## 2021-12-18 DIAGNOSIS — F43.10 PTSD (POST-TRAUMATIC STRESS DISORDER): ICD-10-CM

## 2021-12-18 PROBLEM — M79.605 LEFT LEG PAIN: Status: ACTIVE | Noted: 2021-12-18

## 2021-12-18 LAB
25(OH)D3 SERPL-MCNC: 27.1 NG/ML (ref 30–100)
CHOLEST SERPL-MCNC: 119 MG/DL
FOLATE SERPL-MCNC: 10.5 NG/ML (ref 3.1–17.5)
HDLC SERPL-MCNC: 34 MG/DL
LDLC SERPL CALC-MCNC: 63 MG/DL (ref 0–100)
NONHDLC SERPL-MCNC: 85 MG/DL
TRIGL SERPL-MCNC: 112 MG/DL
VIT B12 SERPL-MCNC: 510 PG/ML (ref 100–900)

## 2021-12-18 PROCEDURE — 86592 SYPHILIS TEST NON-TREP QUAL: CPT | Performed by: PSYCHIATRY & NEUROLOGY

## 2021-12-18 PROCEDURE — 80061 LIPID PANEL: CPT | Performed by: PSYCHIATRY & NEUROLOGY

## 2021-12-18 PROCEDURE — 99222 1ST HOSP IP/OBS MODERATE 55: CPT | Performed by: PHYSICIAN ASSISTANT

## 2021-12-18 PROCEDURE — 99221 1ST HOSP IP/OBS SF/LOW 40: CPT | Performed by: PSYCHIATRY & NEUROLOGY

## 2021-12-18 PROCEDURE — 82306 VITAMIN D 25 HYDROXY: CPT | Performed by: PSYCHIATRY & NEUROLOGY

## 2021-12-18 PROCEDURE — 82746 ASSAY OF FOLIC ACID SERUM: CPT | Performed by: PSYCHIATRY & NEUROLOGY

## 2021-12-18 PROCEDURE — 82607 VITAMIN B-12: CPT | Performed by: PSYCHIATRY & NEUROLOGY

## 2021-12-18 RX ORDER — MAGNESIUM HYDROXIDE/ALUMINUM HYDROXICE/SIMETHICONE 120; 1200; 1200 MG/30ML; MG/30ML; MG/30ML
30 SUSPENSION ORAL EVERY 4 HOURS PRN
Status: DISCONTINUED | OUTPATIENT
Start: 2021-12-18 | End: 2021-12-22 | Stop reason: HOSPADM

## 2021-12-18 RX ORDER — AMOXICILLIN 250 MG
1 CAPSULE ORAL DAILY PRN
Status: DISCONTINUED | OUTPATIENT
Start: 2021-12-18 | End: 2021-12-22 | Stop reason: HOSPADM

## 2021-12-18 RX ORDER — MINERAL OIL AND PETROLATUM 150; 830 MG/G; MG/G
1 OINTMENT OPHTHALMIC
Status: DISCONTINUED | OUTPATIENT
Start: 2021-12-18 | End: 2021-12-22 | Stop reason: HOSPADM

## 2021-12-18 RX ORDER — LORAZEPAM 2 MG/ML
1 INJECTION INTRAMUSCULAR
Status: DISCONTINUED | OUTPATIENT
Start: 2021-12-18 | End: 2021-12-22 | Stop reason: HOSPADM

## 2021-12-18 RX ORDER — LORAZEPAM 2 MG/ML
2 INJECTION INTRAMUSCULAR EVERY 6 HOURS PRN
Status: DISCONTINUED | OUTPATIENT
Start: 2021-12-18 | End: 2021-12-22 | Stop reason: HOSPADM

## 2021-12-18 RX ORDER — LANOLIN ALCOHOL/MO/W.PET/CERES
3 CREAM (GRAM) TOPICAL
Status: DISCONTINUED | OUTPATIENT
Start: 2021-12-18 | End: 2021-12-22 | Stop reason: HOSPADM

## 2021-12-18 RX ORDER — FLUOXETINE HYDROCHLORIDE 20 MG/1
80 CAPSULE ORAL DAILY
Status: DISCONTINUED | OUTPATIENT
Start: 2021-12-19 | End: 2021-12-22 | Stop reason: HOSPADM

## 2021-12-18 RX ORDER — POLYETHYLENE GLYCOL 3350 17 G/17G
17 POWDER, FOR SOLUTION ORAL DAILY PRN
Status: DISCONTINUED | OUTPATIENT
Start: 2021-12-18 | End: 2021-12-22 | Stop reason: HOSPADM

## 2021-12-18 RX ORDER — HALOPERIDOL 5 MG
5 TABLET ORAL
Status: DISCONTINUED | OUTPATIENT
Start: 2021-12-18 | End: 2021-12-22 | Stop reason: HOSPADM

## 2021-12-18 RX ORDER — ACETAMINOPHEN 325 MG/1
650 TABLET ORAL EVERY 6 HOURS PRN
Status: DISCONTINUED | OUTPATIENT
Start: 2021-12-18 | End: 2021-12-22 | Stop reason: HOSPADM

## 2021-12-18 RX ORDER — ACETAMINOPHEN 325 MG/1
975 TABLET ORAL EVERY 6 HOURS PRN
Status: DISCONTINUED | OUTPATIENT
Start: 2021-12-18 | End: 2021-12-22 | Stop reason: HOSPADM

## 2021-12-18 RX ORDER — HALOPERIDOL 1 MG/1
1 TABLET ORAL EVERY 6 HOURS PRN
Status: DISCONTINUED | OUTPATIENT
Start: 2021-12-18 | End: 2021-12-22 | Stop reason: HOSPADM

## 2021-12-18 RX ORDER — BENZTROPINE MESYLATE 1 MG/ML
1 INJECTION INTRAMUSCULAR; INTRAVENOUS
Status: DISCONTINUED | OUTPATIENT
Start: 2021-12-18 | End: 2021-12-22 | Stop reason: HOSPADM

## 2021-12-18 RX ORDER — BENZTROPINE MESYLATE 1 MG/ML
0.5 INJECTION INTRAMUSCULAR; INTRAVENOUS
Status: DISCONTINUED | OUTPATIENT
Start: 2021-12-18 | End: 2021-12-22 | Stop reason: HOSPADM

## 2021-12-18 RX ORDER — BISACODYL 10 MG
10 SUPPOSITORY, RECTAL RECTAL DAILY PRN
Status: DISCONTINUED | OUTPATIENT
Start: 2021-12-18 | End: 2021-12-18

## 2021-12-18 RX ORDER — LORAZEPAM 2 MG/ML
2 INJECTION INTRAMUSCULAR
Status: DISCONTINUED | OUTPATIENT
Start: 2021-12-18 | End: 2021-12-22 | Stop reason: HOSPADM

## 2021-12-18 RX ORDER — BENZTROPINE MESYLATE 1 MG/1
1 TABLET ORAL
Status: DISCONTINUED | OUTPATIENT
Start: 2021-12-18 | End: 2021-12-22 | Stop reason: HOSPADM

## 2021-12-18 RX ORDER — HYDROXYZINE HYDROCHLORIDE 25 MG/1
25 TABLET, FILM COATED ORAL
Status: DISCONTINUED | OUTPATIENT
Start: 2021-12-18 | End: 2021-12-22 | Stop reason: HOSPADM

## 2021-12-18 RX ORDER — FLUPHENAZINE HYDROCHLORIDE 5 MG/1
5 TABLET ORAL
Status: DISCONTINUED | OUTPATIENT
Start: 2021-12-19 | End: 2021-12-22 | Stop reason: HOSPADM

## 2021-12-18 RX ORDER — BISACODYL 10 MG
10 SUPPOSITORY, RECTAL RECTAL DAILY PRN
Status: DISCONTINUED | OUTPATIENT
Start: 2021-12-18 | End: 2021-12-22 | Stop reason: HOSPADM

## 2021-12-18 RX ORDER — HALOPERIDOL 5 MG/ML
5 INJECTION INTRAMUSCULAR
Status: DISCONTINUED | OUTPATIENT
Start: 2021-12-18 | End: 2021-12-22 | Stop reason: HOSPADM

## 2021-12-18 RX ORDER — HYDROXYZINE 50 MG/1
50 TABLET, FILM COATED ORAL
Status: DISCONTINUED | OUTPATIENT
Start: 2021-12-18 | End: 2021-12-22 | Stop reason: HOSPADM

## 2021-12-18 RX ORDER — HALOPERIDOL 5 MG/ML
2.5 INJECTION INTRAMUSCULAR
Status: DISCONTINUED | OUTPATIENT
Start: 2021-12-18 | End: 2021-12-22 | Stop reason: HOSPADM

## 2021-12-18 RX ORDER — TRAZODONE HYDROCHLORIDE 50 MG/1
50 TABLET ORAL
Status: DISCONTINUED | OUTPATIENT
Start: 2021-12-18 | End: 2021-12-22 | Stop reason: HOSPADM

## 2021-12-18 RX ORDER — ATORVASTATIN CALCIUM 40 MG/1
40 TABLET, FILM COATED ORAL
Status: DISCONTINUED | OUTPATIENT
Start: 2021-12-18 | End: 2021-12-22 | Stop reason: HOSPADM

## 2021-12-18 RX ORDER — PRAZOSIN HYDROCHLORIDE 1 MG/1
2 CAPSULE ORAL
Status: DISCONTINUED | OUTPATIENT
Start: 2021-12-18 | End: 2021-12-22 | Stop reason: HOSPADM

## 2021-12-18 RX ORDER — FLUPHENAZINE HYDROCHLORIDE 5 MG/1
10 TABLET ORAL
Status: DISCONTINUED | OUTPATIENT
Start: 2021-12-18 | End: 2021-12-22 | Stop reason: HOSPADM

## 2021-12-18 RX ORDER — NYSTATIN 100000 [USP'U]/G
POWDER TOPICAL 2 TIMES DAILY
Status: DISCONTINUED | OUTPATIENT
Start: 2021-12-18 | End: 2021-12-22 | Stop reason: HOSPADM

## 2021-12-18 RX ORDER — HALOPERIDOL 5 MG
2.5 TABLET ORAL
Status: DISCONTINUED | OUTPATIENT
Start: 2021-12-18 | End: 2021-12-22 | Stop reason: HOSPADM

## 2021-12-18 RX ORDER — LISINOPRIL 20 MG/1
20 TABLET ORAL DAILY
Status: DISCONTINUED | OUTPATIENT
Start: 2021-12-18 | End: 2021-12-22 | Stop reason: HOSPADM

## 2021-12-18 RX ORDER — ASPIRIN 81 MG/1
81 TABLET ORAL DAILY
Status: DISCONTINUED | OUTPATIENT
Start: 2021-12-18 | End: 2021-12-22 | Stop reason: HOSPADM

## 2021-12-18 RX ORDER — HYDROXYZINE 50 MG/1
100 TABLET, FILM COATED ORAL
Status: DISCONTINUED | OUTPATIENT
Start: 2021-12-18 | End: 2021-12-22 | Stop reason: HOSPADM

## 2021-12-18 RX ORDER — DIVALPROEX SODIUM 500 MG/1
2000 TABLET, EXTENDED RELEASE ORAL
Status: DISCONTINUED | OUTPATIENT
Start: 2021-12-18 | End: 2021-12-22 | Stop reason: HOSPADM

## 2021-12-18 RX ORDER — ACETAMINOPHEN 325 MG/1
650 TABLET ORAL EVERY 4 HOURS PRN
Status: DISCONTINUED | OUTPATIENT
Start: 2021-12-18 | End: 2021-12-22 | Stop reason: HOSPADM

## 2021-12-18 RX ORDER — DIPHENHYDRAMINE HYDROCHLORIDE 50 MG/ML
50 INJECTION INTRAMUSCULAR; INTRAVENOUS EVERY 6 HOURS PRN
Status: DISCONTINUED | OUTPATIENT
Start: 2021-12-18 | End: 2021-12-22 | Stop reason: HOSPADM

## 2021-12-18 RX ADMIN — LISINOPRIL 20 MG: 20 TABLET ORAL at 08:47

## 2021-12-18 RX ADMIN — DIVALPROEX SODIUM 2000 MG: 500 TABLET, EXTENDED RELEASE ORAL at 21:40

## 2021-12-18 RX ADMIN — ASPIRIN 81 MG: 81 TABLET, COATED ORAL at 08:47

## 2021-12-18 RX ADMIN — NYSTATIN: 100000 POWDER TOPICAL at 09:17

## 2021-12-18 RX ADMIN — DICLOFENAC SODIUM 2 G: 10 GEL TOPICAL at 14:05

## 2021-12-18 RX ADMIN — FLUPHENAZINE HYDROCHLORIDE 10 MG: 5 TABLET, FILM COATED ORAL at 21:40

## 2021-12-18 RX ADMIN — Medication 3 MG: at 21:41

## 2021-12-18 RX ADMIN — ATORVASTATIN CALCIUM 40 MG: 40 TABLET, FILM COATED ORAL at 17:23

## 2021-12-18 RX ADMIN — DICLOFENAC SODIUM 2 G: 10 GEL TOPICAL at 09:17

## 2021-12-18 RX ADMIN — NYSTATIN 1 APPLICATION: 100000 POWDER TOPICAL at 21:40

## 2021-12-18 RX ADMIN — DICLOFENAC SODIUM 2 G: 10 GEL TOPICAL at 21:40

## 2021-12-19 LAB — VALPROATE SERPL-MCNC: 97 UG/ML (ref 50–100)

## 2021-12-19 PROCEDURE — 99232 SBSQ HOSP IP/OBS MODERATE 35: CPT | Performed by: PSYCHIATRY & NEUROLOGY

## 2021-12-19 PROCEDURE — 80164 ASSAY DIPROPYLACETIC ACD TOT: CPT | Performed by: PSYCHIATRY & NEUROLOGY

## 2021-12-19 RX ADMIN — NYSTATIN: 100000 POWDER TOPICAL at 17:16

## 2021-12-19 RX ADMIN — ASPIRIN 81 MG: 81 TABLET, COATED ORAL at 09:00

## 2021-12-19 RX ADMIN — DIVALPROEX SODIUM 2000 MG: 500 TABLET, EXTENDED RELEASE ORAL at 21:49

## 2021-12-19 RX ADMIN — DICLOFENAC SODIUM 2 G: 10 GEL TOPICAL at 17:16

## 2021-12-19 RX ADMIN — FLUPHENAZINE HYDROCHLORIDE 5 MG: 5 TABLET, FILM COATED ORAL at 12:35

## 2021-12-19 RX ADMIN — FLUPHENAZINE HYDROCHLORIDE 5 MG: 5 TABLET, FILM COATED ORAL at 06:19

## 2021-12-19 RX ADMIN — NYSTATIN: 100000 POWDER TOPICAL at 09:02

## 2021-12-19 RX ADMIN — ATORVASTATIN CALCIUM 40 MG: 40 TABLET, FILM COATED ORAL at 16:46

## 2021-12-19 RX ADMIN — Medication 3 MG: at 21:50

## 2021-12-19 RX ADMIN — FLUOXETINE 80 MG: 20 CAPSULE ORAL at 09:00

## 2021-12-19 RX ADMIN — DICLOFENAC SODIUM 2 G: 10 GEL TOPICAL at 09:02

## 2021-12-19 RX ADMIN — DICLOFENAC SODIUM 2 G: 10 GEL TOPICAL at 12:35

## 2021-12-19 RX ADMIN — LISINOPRIL 20 MG: 20 TABLET ORAL at 09:00

## 2021-12-19 RX ADMIN — PRAZOSIN HYDROCHLORIDE 2 MG: 1 CAPSULE ORAL at 21:50

## 2021-12-19 RX ADMIN — HALOPERIDOL 2.5 MG: 5 TABLET ORAL at 17:31

## 2021-12-20 LAB — RPR SER QL: NORMAL

## 2021-12-20 PROCEDURE — 99232 SBSQ HOSP IP/OBS MODERATE 35: CPT | Performed by: PHYSICIAN ASSISTANT

## 2021-12-20 RX ADMIN — DIVALPROEX SODIUM 2000 MG: 500 TABLET, EXTENDED RELEASE ORAL at 22:03

## 2021-12-20 RX ADMIN — FLUPHENAZINE HYDROCHLORIDE 10 MG: 5 TABLET, FILM COATED ORAL at 22:04

## 2021-12-20 RX ADMIN — LISINOPRIL 20 MG: 20 TABLET ORAL at 09:01

## 2021-12-20 RX ADMIN — FLUOXETINE 80 MG: 20 CAPSULE ORAL at 09:01

## 2021-12-20 RX ADMIN — ATORVASTATIN CALCIUM 40 MG: 40 TABLET, FILM COATED ORAL at 15:31

## 2021-12-20 RX ADMIN — NYSTATIN: 100000 POWDER TOPICAL at 17:00

## 2021-12-20 RX ADMIN — ASPIRIN 81 MG: 81 TABLET, COATED ORAL at 09:01

## 2021-12-20 RX ADMIN — FLUPHENAZINE HYDROCHLORIDE 5 MG: 5 TABLET, FILM COATED ORAL at 12:13

## 2021-12-20 RX ADMIN — HALOPERIDOL 1 MG: 1 TABLET ORAL at 15:05

## 2021-12-20 RX ADMIN — PRAZOSIN HYDROCHLORIDE 2 MG: 1 CAPSULE ORAL at 22:04

## 2021-12-21 PROBLEM — Z00.8 MEDICAL CLEARANCE FOR PSYCHIATRIC ADMISSION: Status: RESOLVED | Noted: 2021-01-09 | Resolved: 2021-12-21

## 2021-12-21 PROCEDURE — 0241U HB NFCT DS VIR RESP RNA 4 TRGT: CPT | Performed by: PHYSICIAN ASSISTANT

## 2021-12-21 PROCEDURE — 99232 SBSQ HOSP IP/OBS MODERATE 35: CPT | Performed by: PHYSICIAN ASSISTANT

## 2021-12-21 RX ORDER — BUPROPION HYDROCHLORIDE 300 MG/1
300 TABLET ORAL DAILY
Status: DISCONTINUED | OUTPATIENT
Start: 2021-12-21 | End: 2021-12-22 | Stop reason: HOSPADM

## 2021-12-21 RX ADMIN — ATORVASTATIN CALCIUM 40 MG: 40 TABLET, FILM COATED ORAL at 16:49

## 2021-12-21 RX ADMIN — FLUPHENAZINE HYDROCHLORIDE 5 MG: 5 TABLET, FILM COATED ORAL at 06:39

## 2021-12-21 RX ADMIN — DIVALPROEX SODIUM 2000 MG: 500 TABLET, EXTENDED RELEASE ORAL at 21:05

## 2021-12-21 RX ADMIN — FLUPHENAZINE HYDROCHLORIDE 10 MG: 5 TABLET, FILM COATED ORAL at 21:06

## 2021-12-22 VITALS
OXYGEN SATURATION: 95 % | WEIGHT: 262.13 LBS | BODY MASS INDEX: 44.75 KG/M2 | HEIGHT: 64 IN | DIASTOLIC BLOOD PRESSURE: 72 MMHG | RESPIRATION RATE: 17 BRPM | SYSTOLIC BLOOD PRESSURE: 126 MMHG | HEART RATE: 80 BPM | TEMPERATURE: 98.1 F

## 2021-12-22 PROCEDURE — 99238 HOSP IP/OBS DSCHRG MGMT 30/<: CPT | Performed by: STUDENT IN AN ORGANIZED HEALTH CARE EDUCATION/TRAINING PROGRAM

## 2021-12-22 RX ADMIN — ASPIRIN 81 MG: 81 TABLET, COATED ORAL at 09:10

## 2021-12-22 RX ADMIN — FLUPHENAZINE HYDROCHLORIDE 5 MG: 5 TABLET, FILM COATED ORAL at 06:04

## 2021-12-22 RX ADMIN — FLUOXETINE 80 MG: 20 CAPSULE ORAL at 09:10

## 2021-12-22 RX ADMIN — LISINOPRIL 20 MG: 20 TABLET ORAL at 09:10

## 2021-12-22 RX ADMIN — BUPROPION HYDROCHLORIDE 300 MG: 300 TABLET, FILM COATED, EXTENDED RELEASE ORAL at 09:10

## 2021-12-29 ENCOUNTER — HOSPITAL ENCOUNTER (EMERGENCY)
Facility: HOSPITAL | Age: 51
End: 2021-12-30
Attending: EMERGENCY MEDICINE | Admitting: EMERGENCY MEDICINE
Payer: MEDICARE

## 2021-12-29 DIAGNOSIS — I10 ESSENTIAL HYPERTENSION: ICD-10-CM

## 2021-12-29 DIAGNOSIS — Z00.8 MEDICAL CLEARANCE FOR PSYCHIATRIC ADMISSION: ICD-10-CM

## 2021-12-29 DIAGNOSIS — R45.851 SUICIDAL IDEATION: Primary | ICD-10-CM

## 2021-12-29 DIAGNOSIS — E78.5 HYPERLIPIDEMIA: ICD-10-CM

## 2021-12-29 DIAGNOSIS — F32.A DEPRESSION: ICD-10-CM

## 2021-12-29 LAB
AMPHETAMINES SERPL QL SCN: NEGATIVE
BARBITURATES UR QL: NEGATIVE
BENZODIAZ UR QL: NEGATIVE
COCAINE UR QL: NEGATIVE
ETHANOL EXG-MCNC: 0 MG/DL
FLUAV RNA RESP QL NAA+PROBE: NEGATIVE
FLUBV RNA RESP QL NAA+PROBE: NEGATIVE
METHADONE UR QL: NEGATIVE
OPIATES UR QL SCN: NEGATIVE
OXYCODONE+OXYMORPHONE UR QL SCN: NEGATIVE
PCP UR QL: NEGATIVE
RSV RNA RESP QL NAA+PROBE: NEGATIVE
SARS-COV-2 RNA RESP QL NAA+PROBE: NEGATIVE
THC UR QL: NEGATIVE

## 2021-12-29 PROCEDURE — 82075 ASSAY OF BREATH ETHANOL: CPT | Performed by: EMERGENCY MEDICINE

## 2021-12-29 PROCEDURE — 99285 EMERGENCY DEPT VISIT HI MDM: CPT | Performed by: EMERGENCY MEDICINE

## 2021-12-29 PROCEDURE — 99285 EMERGENCY DEPT VISIT HI MDM: CPT

## 2021-12-29 PROCEDURE — 80307 DRUG TEST PRSMV CHEM ANLYZR: CPT | Performed by: EMERGENCY MEDICINE

## 2021-12-29 PROCEDURE — 0241U HB NFCT DS VIR RESP RNA 4 TRGT: CPT | Performed by: EMERGENCY MEDICINE

## 2021-12-29 RX ORDER — LISINOPRIL 20 MG/1
20 TABLET ORAL DAILY
Status: DISCONTINUED | OUTPATIENT
Start: 2021-12-30 | End: 2021-12-30 | Stop reason: HOSPADM

## 2021-12-29 RX ORDER — FLUPHENAZINE HYDROCHLORIDE 5 MG/1
10 TABLET ORAL
Status: DISCONTINUED | OUTPATIENT
Start: 2021-12-29 | End: 2021-12-30 | Stop reason: HOSPADM

## 2021-12-29 RX ORDER — CALCIUM CARBONATE 200(500)MG
500 TABLET,CHEWABLE ORAL EVERY MORNING
Status: DISCONTINUED | OUTPATIENT
Start: 2021-12-30 | End: 2021-12-30 | Stop reason: HOSPADM

## 2021-12-29 RX ORDER — ATORVASTATIN CALCIUM 40 MG/1
40 TABLET, FILM COATED ORAL
Status: DISCONTINUED | OUTPATIENT
Start: 2021-12-30 | End: 2021-12-30 | Stop reason: HOSPADM

## 2021-12-29 RX ORDER — BUPROPION HYDROCHLORIDE 300 MG/1
300 TABLET ORAL DAILY
Status: DISCONTINUED | OUTPATIENT
Start: 2021-12-30 | End: 2021-12-30 | Stop reason: HOSPADM

## 2021-12-29 RX ORDER — PRAZOSIN HYDROCHLORIDE 1 MG/1
2 CAPSULE ORAL
Status: DISCONTINUED | OUTPATIENT
Start: 2021-12-29 | End: 2021-12-30 | Stop reason: HOSPADM

## 2021-12-29 RX ORDER — FLUOXETINE 10 MG/1
10 CAPSULE ORAL
Status: DISCONTINUED | OUTPATIENT
Start: 2021-12-29 | End: 2021-12-30 | Stop reason: HOSPADM

## 2021-12-29 RX ORDER — ASPIRIN 81 MG/1
81 TABLET, CHEWABLE ORAL DAILY
Status: DISCONTINUED | OUTPATIENT
Start: 2021-12-30 | End: 2021-12-30 | Stop reason: HOSPADM

## 2021-12-29 RX ORDER — POLYETHYLENE GLYCOL 3350 17 G/17G
17 POWDER, FOR SOLUTION ORAL DAILY
Status: DISCONTINUED | OUTPATIENT
Start: 2021-12-30 | End: 2021-12-30 | Stop reason: HOSPADM

## 2021-12-29 RX ORDER — LANOLIN ALCOHOL/MO/W.PET/CERES
3 CREAM (GRAM) TOPICAL
Status: DISCONTINUED | OUTPATIENT
Start: 2021-12-29 | End: 2021-12-30 | Stop reason: HOSPADM

## 2021-12-29 RX ORDER — VALPROIC ACID 250 MG/1
2000 CAPSULE, LIQUID FILLED ORAL
Status: DISCONTINUED | OUTPATIENT
Start: 2021-12-29 | End: 2021-12-30 | Stop reason: HOSPADM

## 2021-12-29 RX ORDER — MELATONIN
1000
Status: DISCONTINUED | OUTPATIENT
Start: 2021-12-29 | End: 2021-12-30 | Stop reason: HOSPADM

## 2021-12-29 RX ORDER — FLUPHENAZINE HYDROCHLORIDE 5 MG/1
5 TABLET ORAL
Status: DISCONTINUED | OUTPATIENT
Start: 2021-12-30 | End: 2021-12-30

## 2021-12-29 RX ADMIN — FLUOXETINE 10 MG: 10 CAPSULE ORAL at 22:04

## 2021-12-29 RX ADMIN — PRAZOSIN HYDROCHLORIDE 2 MG: 1 CAPSULE ORAL at 22:05

## 2021-12-29 RX ADMIN — Medication 1000 UNITS: at 22:05

## 2021-12-29 RX ADMIN — FLUPHENAZINE HYDROCHLORIDE 10 MG: 5 TABLET, FILM COATED ORAL at 22:04

## 2021-12-29 RX ADMIN — VALPROIC ACID 2000 MG: 250 CAPSULE ORAL at 22:04

## 2021-12-30 ENCOUNTER — HOSPITAL ENCOUNTER (INPATIENT)
Facility: HOSPITAL | Age: 51
LOS: 7 days | Discharge: HOME | DRG: 885 | End: 2022-01-06
Attending: STUDENT IN AN ORGANIZED HEALTH CARE EDUCATION/TRAINING PROGRAM | Admitting: STUDENT IN AN ORGANIZED HEALTH CARE EDUCATION/TRAINING PROGRAM
Payer: MEDICARE

## 2021-12-30 VITALS
BODY MASS INDEX: 45.03 KG/M2 | OXYGEN SATURATION: 94 % | WEIGHT: 262.35 LBS | TEMPERATURE: 97.3 F | DIASTOLIC BLOOD PRESSURE: 88 MMHG | RESPIRATION RATE: 20 BRPM | HEART RATE: 77 BPM | SYSTOLIC BLOOD PRESSURE: 150 MMHG

## 2021-12-30 DIAGNOSIS — Z00.8 MEDICAL CLEARANCE FOR PSYCHIATRIC ADMISSION: ICD-10-CM

## 2021-12-30 DIAGNOSIS — E55.9 VITAMIN D DEFICIENCY: ICD-10-CM

## 2021-12-30 DIAGNOSIS — E78.5 HYPERLIPIDEMIA: ICD-10-CM

## 2021-12-30 DIAGNOSIS — F31.4 BIPOLAR AFFECTIVE DISORDER, DEPRESSED, SEVERE (HCC): Primary | ICD-10-CM

## 2021-12-30 DIAGNOSIS — B37.2 CUTANEOUS CANDIDIASIS: ICD-10-CM

## 2021-12-30 DIAGNOSIS — F17.200 NICOTINE DEPENDENCE: ICD-10-CM

## 2021-12-30 DIAGNOSIS — K59.00 CONSTIPATION: ICD-10-CM

## 2021-12-30 DIAGNOSIS — E83.51 HYPOCALCEMIA: ICD-10-CM

## 2021-12-30 DIAGNOSIS — F43.10 PTSD (POST-TRAUMATIC STRESS DISORDER): ICD-10-CM

## 2021-12-30 DIAGNOSIS — I10 ESSENTIAL HYPERTENSION: ICD-10-CM

## 2021-12-30 DIAGNOSIS — M79.605 LEFT LEG PAIN: ICD-10-CM

## 2021-12-30 DIAGNOSIS — E78.5 HYPERLIPIDEMIA, UNSPECIFIED HYPERLIPIDEMIA TYPE: ICD-10-CM

## 2021-12-30 PROCEDURE — NC001 PR NO CHARGE: Performed by: PHYSICIAN ASSISTANT

## 2021-12-30 RX ORDER — OLANZAPINE 10 MG/1
10 INJECTION, POWDER, LYOPHILIZED, FOR SOLUTION INTRAMUSCULAR
Status: CANCELLED | OUTPATIENT
Start: 2021-12-30

## 2021-12-30 RX ORDER — ACETAMINOPHEN 325 MG/1
650 TABLET ORAL EVERY 4 HOURS PRN
Status: DISCONTINUED | OUTPATIENT
Start: 2021-12-30 | End: 2022-01-06 | Stop reason: HOSPADM

## 2021-12-30 RX ORDER — ATORVASTATIN CALCIUM 40 MG/1
40 TABLET, FILM COATED ORAL
Status: CANCELLED | OUTPATIENT
Start: 2021-12-30

## 2021-12-30 RX ORDER — DIPHENHYDRAMINE HYDROCHLORIDE 50 MG/ML
50 INJECTION INTRAMUSCULAR; INTRAVENOUS EVERY 6 HOURS PRN
Status: DISCONTINUED | OUTPATIENT
Start: 2021-12-30 | End: 2022-01-06 | Stop reason: HOSPADM

## 2021-12-30 RX ORDER — LISINOPRIL 20 MG/1
20 TABLET ORAL DAILY
Status: CANCELLED | OUTPATIENT
Start: 2021-12-30

## 2021-12-30 RX ORDER — FLUPHENAZINE HYDROCHLORIDE 5 MG/1
10 TABLET ORAL
Status: CANCELLED | OUTPATIENT
Start: 2021-12-30

## 2021-12-30 RX ORDER — ACETAMINOPHEN 325 MG/1
650 TABLET ORAL EVERY 6 HOURS PRN
Status: CANCELLED | OUTPATIENT
Start: 2021-12-30

## 2021-12-30 RX ORDER — BENZTROPINE MESYLATE 1 MG/1
1 TABLET ORAL
Status: DISCONTINUED | OUTPATIENT
Start: 2021-12-30 | End: 2022-01-06 | Stop reason: HOSPADM

## 2021-12-30 RX ORDER — OLANZAPINE 2.5 MG/1
10 TABLET ORAL
Status: CANCELLED | OUTPATIENT
Start: 2021-12-30

## 2021-12-30 RX ORDER — LISINOPRIL 20 MG/1
20 TABLET ORAL DAILY
Status: DISCONTINUED | OUTPATIENT
Start: 2021-12-31 | End: 2022-01-06 | Stop reason: HOSPADM

## 2021-12-30 RX ORDER — ASPIRIN 81 MG/1
81 TABLET, CHEWABLE ORAL DAILY
Status: DISCONTINUED | OUTPATIENT
Start: 2021-12-31 | End: 2022-01-06 | Stop reason: HOSPADM

## 2021-12-30 RX ORDER — OLANZAPINE 10 MG/1
10 INJECTION, POWDER, LYOPHILIZED, FOR SOLUTION INTRAMUSCULAR
Status: DISCONTINUED | OUTPATIENT
Start: 2021-12-30 | End: 2022-01-06 | Stop reason: HOSPADM

## 2021-12-30 RX ORDER — CALCIUM CARBONATE 200(500)MG
500 TABLET,CHEWABLE ORAL EVERY MORNING
Status: CANCELLED | OUTPATIENT
Start: 2021-12-31

## 2021-12-30 RX ORDER — OLANZAPINE 2.5 MG/1
5 TABLET ORAL
Status: CANCELLED | OUTPATIENT
Start: 2021-12-30

## 2021-12-30 RX ORDER — FLUOXETINE 10 MG/1
10 CAPSULE ORAL
Status: CANCELLED | OUTPATIENT
Start: 2021-12-30

## 2021-12-30 RX ORDER — FLUPHENAZINE HYDROCHLORIDE 5 MG/1
5 TABLET ORAL
Status: CANCELLED | OUTPATIENT
Start: 2021-12-31

## 2021-12-30 RX ORDER — MELATONIN
1000
Status: CANCELLED | OUTPATIENT
Start: 2021-12-30

## 2021-12-30 RX ORDER — ACETAMINOPHEN 325 MG/1
650 TABLET ORAL EVERY 6 HOURS PRN
Status: DISCONTINUED | OUTPATIENT
Start: 2021-12-30 | End: 2022-01-06 | Stop reason: HOSPADM

## 2021-12-30 RX ORDER — OLANZAPINE 10 MG/1
5 INJECTION, POWDER, LYOPHILIZED, FOR SOLUTION INTRAMUSCULAR
Status: CANCELLED | OUTPATIENT
Start: 2021-12-30

## 2021-12-30 RX ORDER — FLUPHENAZINE HYDROCHLORIDE 5 MG/1
5 TABLET ORAL
Status: DISCONTINUED | OUTPATIENT
Start: 2021-12-30 | End: 2021-12-30 | Stop reason: HOSPADM

## 2021-12-30 RX ORDER — LORAZEPAM 2 MG/ML
2 INJECTION INTRAMUSCULAR EVERY 6 HOURS PRN
Status: DISCONTINUED | OUTPATIENT
Start: 2021-12-30 | End: 2022-01-06 | Stop reason: HOSPADM

## 2021-12-30 RX ORDER — ASPIRIN 81 MG/1
81 TABLET, CHEWABLE ORAL DAILY
Status: CANCELLED | OUTPATIENT
Start: 2021-12-30

## 2021-12-30 RX ORDER — MELATONIN
1000
Status: DISCONTINUED | OUTPATIENT
Start: 2021-12-30 | End: 2022-01-06 | Stop reason: HOSPADM

## 2021-12-30 RX ORDER — AMOXICILLIN 250 MG
1 CAPSULE ORAL DAILY PRN
Status: DISCONTINUED | OUTPATIENT
Start: 2021-12-30 | End: 2022-01-06 | Stop reason: HOSPADM

## 2021-12-30 RX ORDER — BENZTROPINE MESYLATE 1 MG/ML
1 INJECTION INTRAMUSCULAR; INTRAVENOUS
Status: CANCELLED | OUTPATIENT
Start: 2021-12-30

## 2021-12-30 RX ORDER — MINERAL OIL AND PETROLATUM 150; 830 MG/G; MG/G
1 OINTMENT OPHTHALMIC
Status: CANCELLED | OUTPATIENT
Start: 2021-12-30

## 2021-12-30 RX ORDER — BISACODYL 10 MG
10 SUPPOSITORY, RECTAL RECTAL DAILY PRN
Status: DISCONTINUED | OUTPATIENT
Start: 2021-12-30 | End: 2022-01-01

## 2021-12-30 RX ORDER — ACETAMINOPHEN 325 MG/1
975 TABLET ORAL EVERY 6 HOURS PRN
Status: CANCELLED | OUTPATIENT
Start: 2021-12-30

## 2021-12-30 RX ORDER — OLANZAPINE 2.5 MG/1
2.5 TABLET ORAL
Status: DISCONTINUED | OUTPATIENT
Start: 2021-12-30 | End: 2022-01-06 | Stop reason: HOSPADM

## 2021-12-30 RX ORDER — LORAZEPAM 2 MG/ML
2 INJECTION INTRAMUSCULAR EVERY 6 HOURS PRN
Status: CANCELLED | OUTPATIENT
Start: 2021-12-30

## 2021-12-30 RX ORDER — HYDROXYZINE 50 MG/1
50 TABLET, FILM COATED ORAL
Status: DISCONTINUED | OUTPATIENT
Start: 2021-12-30 | End: 2022-01-06 | Stop reason: HOSPADM

## 2021-12-30 RX ORDER — HYDROXYZINE HYDROCHLORIDE 25 MG/1
25 TABLET, FILM COATED ORAL
Status: DISCONTINUED | OUTPATIENT
Start: 2021-12-30 | End: 2022-01-06 | Stop reason: HOSPADM

## 2021-12-30 RX ORDER — VALPROIC ACID 250 MG/1
2000 CAPSULE, LIQUID FILLED ORAL
Status: DISCONTINUED | OUTPATIENT
Start: 2021-12-30 | End: 2022-01-03

## 2021-12-30 RX ORDER — FLUPHENAZINE HYDROCHLORIDE 5 MG/1
10 TABLET ORAL
Status: DISCONTINUED | OUTPATIENT
Start: 2021-12-30 | End: 2021-12-31

## 2021-12-30 RX ORDER — BENZTROPINE MESYLATE 1 MG/ML
1 INJECTION INTRAMUSCULAR; INTRAVENOUS
Status: DISCONTINUED | OUTPATIENT
Start: 2021-12-30 | End: 2022-01-06 | Stop reason: HOSPADM

## 2021-12-30 RX ORDER — ATORVASTATIN CALCIUM 40 MG/1
40 TABLET, FILM COATED ORAL
Status: DISCONTINUED | OUTPATIENT
Start: 2021-12-31 | End: 2022-01-06 | Stop reason: HOSPADM

## 2021-12-30 RX ORDER — LANOLIN ALCOHOL/MO/W.PET/CERES
3 CREAM (GRAM) TOPICAL
Status: DISCONTINUED | OUTPATIENT
Start: 2021-12-30 | End: 2022-01-06 | Stop reason: HOSPADM

## 2021-12-30 RX ORDER — HYDROXYZINE 50 MG/1
100 TABLET, FILM COATED ORAL
Status: DISCONTINUED | OUTPATIENT
Start: 2021-12-30 | End: 2022-01-06 | Stop reason: HOSPADM

## 2021-12-30 RX ORDER — HYDROXYZINE HYDROCHLORIDE 25 MG/1
50 TABLET, FILM COATED ORAL
Status: CANCELLED | OUTPATIENT
Start: 2021-12-30

## 2021-12-30 RX ORDER — OLANZAPINE 10 MG/1
10 TABLET ORAL
Status: DISCONTINUED | OUTPATIENT
Start: 2021-12-30 | End: 2022-01-06 | Stop reason: HOSPADM

## 2021-12-30 RX ORDER — MINERAL OIL AND PETROLATUM 150; 830 MG/G; MG/G
1 OINTMENT OPHTHALMIC
Status: DISCONTINUED | OUTPATIENT
Start: 2021-12-30 | End: 2022-01-06 | Stop reason: HOSPADM

## 2021-12-30 RX ORDER — POLYETHYLENE GLYCOL 3350 17 G/17G
17 POWDER, FOR SOLUTION ORAL DAILY PRN
Status: CANCELLED | OUTPATIENT
Start: 2021-12-30

## 2021-12-30 RX ORDER — FLUOXETINE 10 MG/1
10 CAPSULE ORAL
Status: DISCONTINUED | OUTPATIENT
Start: 2021-12-30 | End: 2021-12-31

## 2021-12-30 RX ORDER — OLANZAPINE 2.5 MG/1
2.5 TABLET ORAL
Status: CANCELLED | OUTPATIENT
Start: 2021-12-30

## 2021-12-30 RX ORDER — HYDROXYZINE HYDROCHLORIDE 25 MG/1
25 TABLET, FILM COATED ORAL
Status: CANCELLED | OUTPATIENT
Start: 2021-12-30

## 2021-12-30 RX ORDER — BUPROPION HYDROCHLORIDE 300 MG/1
300 TABLET ORAL DAILY
Status: DISCONTINUED | OUTPATIENT
Start: 2021-12-31 | End: 2022-01-06 | Stop reason: HOSPADM

## 2021-12-30 RX ORDER — MAGNESIUM HYDROXIDE/ALUMINUM HYDROXICE/SIMETHICONE 120; 1200; 1200 MG/30ML; MG/30ML; MG/30ML
30 SUSPENSION ORAL EVERY 4 HOURS PRN
Status: DISCONTINUED | OUTPATIENT
Start: 2021-12-30 | End: 2022-01-06 | Stop reason: HOSPADM

## 2021-12-30 RX ORDER — BUPROPION HYDROCHLORIDE 300 MG/1
300 TABLET ORAL DAILY
Status: CANCELLED | OUTPATIENT
Start: 2021-12-30

## 2021-12-30 RX ORDER — POLYETHYLENE GLYCOL 3350 17 G/17G
17 POWDER, FOR SOLUTION ORAL DAILY PRN
Status: DISCONTINUED | OUTPATIENT
Start: 2021-12-30 | End: 2022-01-01 | Stop reason: SDUPTHER

## 2021-12-30 RX ORDER — TRAZODONE HYDROCHLORIDE 50 MG/1
50 TABLET ORAL
Status: DISCONTINUED | OUTPATIENT
Start: 2021-12-30 | End: 2022-01-06 | Stop reason: HOSPADM

## 2021-12-30 RX ORDER — DIPHENHYDRAMINE HYDROCHLORIDE 50 MG/ML
50 INJECTION INTRAMUSCULAR; INTRAVENOUS EVERY 6 HOURS PRN
Status: CANCELLED | OUTPATIENT
Start: 2021-12-30

## 2021-12-30 RX ORDER — ACETAMINOPHEN 325 MG/1
650 TABLET ORAL EVERY 4 HOURS PRN
Status: CANCELLED | OUTPATIENT
Start: 2021-12-30

## 2021-12-30 RX ORDER — FLUPHENAZINE HYDROCHLORIDE 5 MG/1
5 TABLET ORAL
Status: DISCONTINUED | OUTPATIENT
Start: 2021-12-31 | End: 2022-01-06 | Stop reason: HOSPADM

## 2021-12-30 RX ORDER — TRAZODONE HYDROCHLORIDE 50 MG/1
50 TABLET ORAL
Status: CANCELLED | OUTPATIENT
Start: 2021-12-30

## 2021-12-30 RX ORDER — HYDROXYZINE HYDROCHLORIDE 25 MG/1
100 TABLET, FILM COATED ORAL
Status: CANCELLED | OUTPATIENT
Start: 2021-12-30

## 2021-12-30 RX ORDER — PRAZOSIN HYDROCHLORIDE 1 MG/1
2 CAPSULE ORAL
Status: CANCELLED | OUTPATIENT
Start: 2021-12-30

## 2021-12-30 RX ORDER — ACETAMINOPHEN 325 MG/1
975 TABLET ORAL EVERY 6 HOURS PRN
Status: DISCONTINUED | OUTPATIENT
Start: 2021-12-30 | End: 2022-01-06 | Stop reason: HOSPADM

## 2021-12-30 RX ORDER — OLANZAPINE 5 MG/1
5 TABLET ORAL
Status: DISCONTINUED | OUTPATIENT
Start: 2021-12-30 | End: 2022-01-06 | Stop reason: HOSPADM

## 2021-12-30 RX ORDER — MAGNESIUM HYDROXIDE/ALUMINUM HYDROXICE/SIMETHICONE 120; 1200; 1200 MG/30ML; MG/30ML; MG/30ML
30 SUSPENSION ORAL EVERY 4 HOURS PRN
Status: CANCELLED | OUTPATIENT
Start: 2021-12-30

## 2021-12-30 RX ORDER — AMOXICILLIN 250 MG
1 CAPSULE ORAL DAILY PRN
Status: CANCELLED | OUTPATIENT
Start: 2021-12-30

## 2021-12-30 RX ORDER — PRAZOSIN HYDROCHLORIDE 1 MG/1
2 CAPSULE ORAL
Status: DISCONTINUED | OUTPATIENT
Start: 2021-12-30 | End: 2022-01-06 | Stop reason: HOSPADM

## 2021-12-30 RX ORDER — POLYETHYLENE GLYCOL 3350 17 G/17G
17 POWDER, FOR SOLUTION ORAL DAILY
Status: DISCONTINUED | OUTPATIENT
Start: 2021-12-31 | End: 2022-01-01

## 2021-12-30 RX ORDER — BENZTROPINE MESYLATE 0.5 MG/1
1 TABLET ORAL
Status: CANCELLED | OUTPATIENT
Start: 2021-12-30

## 2021-12-30 RX ORDER — POLYETHYLENE GLYCOL 3350 17 G/17G
17 POWDER, FOR SOLUTION ORAL DAILY
Status: CANCELLED | OUTPATIENT
Start: 2021-12-30

## 2021-12-30 RX ORDER — BISACODYL 10 MG
10 SUPPOSITORY, RECTAL RECTAL DAILY PRN
Status: CANCELLED | OUTPATIENT
Start: 2021-12-30

## 2021-12-30 RX ORDER — OLANZAPINE 10 MG/1
5 INJECTION, POWDER, LYOPHILIZED, FOR SOLUTION INTRAMUSCULAR
Status: DISCONTINUED | OUTPATIENT
Start: 2021-12-30 | End: 2022-01-06 | Stop reason: HOSPADM

## 2021-12-30 RX ORDER — LANOLIN ALCOHOL/MO/W.PET/CERES
3 CREAM (GRAM) TOPICAL
Status: CANCELLED | OUTPATIENT
Start: 2021-12-30

## 2021-12-30 RX ORDER — NYSTATIN 100000 U/G
CREAM TOPICAL 2 TIMES DAILY
Status: DISCONTINUED | OUTPATIENT
Start: 2021-12-30 | End: 2022-01-06 | Stop reason: HOSPADM

## 2021-12-30 RX ORDER — CALCIUM CARBONATE 200(500)MG
500 TABLET,CHEWABLE ORAL EVERY MORNING
Status: DISCONTINUED | OUTPATIENT
Start: 2021-12-31 | End: 2022-01-06 | Stop reason: HOSPADM

## 2021-12-30 RX ORDER — VALPROIC ACID 250 MG/1
2000 CAPSULE, LIQUID FILLED ORAL
Status: CANCELLED | OUTPATIENT
Start: 2021-12-30

## 2021-12-30 RX ADMIN — FLUPHENAZINE HYDROCHLORIDE 5 MG: 5 TABLET, FILM COATED ORAL at 09:38

## 2021-12-30 RX ADMIN — FLUPHENAZINE HYDROCHLORIDE 10 MG: 5 TABLET, FILM COATED ORAL at 21:21

## 2021-12-30 RX ADMIN — ATORVASTATIN CALCIUM 40 MG: 40 TABLET, FILM COATED ORAL at 17:49

## 2021-12-30 RX ADMIN — PRAZOSIN HYDROCHLORIDE 2 MG: 1 CAPSULE ORAL at 21:22

## 2021-12-30 RX ADMIN — BUPROPION HYDROCHLORIDE 300 MG: 300 TABLET, FILM COATED, EXTENDED RELEASE ORAL at 08:43

## 2021-12-30 RX ADMIN — FLUPHENAZINE HYDROCHLORIDE 5 MG: 5 TABLET, FILM COATED ORAL at 17:49

## 2021-12-30 RX ADMIN — NYSTATIN 1 APPLICATION: 100000 CREAM TOPICAL at 20:52

## 2021-12-30 RX ADMIN — LISINOPRIL 20 MG: 20 TABLET ORAL at 08:43

## 2021-12-30 RX ADMIN — Medication 1000 UNITS: at 21:22

## 2021-12-30 RX ADMIN — VALPROIC ACID 2000 MG: 250 CAPSULE ORAL at 21:22

## 2021-12-30 RX ADMIN — Medication 3 MG: at 21:22

## 2021-12-30 RX ADMIN — FLUOXETINE 10 MG: 10 CAPSULE ORAL at 21:22

## 2021-12-30 RX ADMIN — ASPIRIN 81 MG CHEWABLE TABLET 81 MG: 81 TABLET CHEWABLE at 08:43

## 2021-12-30 RX ADMIN — POLYETHYLENE GLYCOL 3350 17 G: 17 POWDER, FOR SOLUTION ORAL at 08:43

## 2021-12-30 RX ADMIN — CALCIUM CARBONATE (ANTACID) CHEW TAB 500 MG 500 MG: 500 CHEW TAB at 08:43

## 2021-12-30 NOTE — ED NOTES
Bed search:    [facility]): No beds  [facility]: No answer  [facility]: No bed  [facility]: No beds  [facility]: No beds  [facility]: no beds  [place]: Clinical faxed  [facility]: No beds  [facility]: Left message  [facility]: No answer

## 2021-12-30 NOTE — ED CARE HANDOFF
Emergency Department Sign Out Note        Sign out and transfer of care from Dr Hermilo Joiner  See Separate Emergency Department note  The patient, Elizabeth Ruiz, was evaluated by the previous provider for suicidal ideation with plan to jump in front of a car  Workup Completed:  Patient medically cleared for inpatient psychiatric admission  Crisis consulted  Patient signed 12  Placement pending  ED Course / Workup Pending (followup): Psychiatry consult placed  Patient evaluated by psychiatry and accepted at [facility]  Procedures  MDM        Disposition  Final diagnoses:   Suicidal ideation   Depression     Time reflects when diagnosis was documented in both MDM as applicable and the Disposition within this note     Time User Action Codes Description Comment    12/29/2021  7:27 PM Creed Snuffer Add [W31 558] Suicidal ideation     12/29/2021  7:27 PM Creed Snuffer Add [facility] Hunt  A] Depression     12/30/2021  2:12 PM Rosalie Hoit Add [Z00 8] Medical clearance for psychiatric admission     12/30/2021  2:12 PM Rosalie Hoit Add [E78 5] Hyperlipidemia     12/30/2021  2:12 PM Rosalie Hoit Add [I10] Essential hypertension       ED Disposition     ED Disposition Condition Date/Time Comment    Transfer to [facility]  Thu Dec 30, 2021  2:22 PM Elizabeth Ruiz should be transferred out to [facility] and has been medically cleared          MD Documentation      Most Recent Value   Patient Condition The patient has been stabilized such that within reasonable medical probability, no material deterioration of the patient condition or the condition of the unborn child(courtnye) is likely to result from the transfer   Benefits of Transfer Specialized equipment and/or services available at the receiving facility (Include comment)________________________  [inpatient psychiatry]   Risks of Transfer Potential for delay in receiving treatment, Potential deterioration of medical condition, Increased discomfort during transfer, Possible worsening of condition or death during transfer   Accepting Physician [address] Name, [facility]   Sending MD Dr Mary Lizarraga   Provider Certification General risk, such as traffic hazards, adverse weather conditions, rough terrain or turbulence, possible failure of equipment (including vehicle or aircraft), or consequences of actions of persons outside the control of the transport personnel, Unanticipated needs of medical equipment and personnel during transport, The possibility of a transport vehicle being unavailable, Risk of worsening condition      RN Documentation      Most Recent Value   Accepting Facility Name, [facility]      Follow-up Information    None       Patient's Medications   Discharge Prescriptions    No medications on file     No discharge procedures on file         ED Provider  Electronically Signed by     Errol Ayala MD  12/30/21 5485

## 2021-12-30 NOTE — ED CARE HANDOFF
Emergency Department Sign Out Note        Sign out and transfer of care from [address] [place] [address]*  See Separate Emergency Department note  The patient, Margarito Hudson, was evaluated by the previous provider for suicidal thoughts to jump in front of a car  Workup Completed:  Signed 201, medically cleared    ED Course / Workup Pending (followup): Bed search                                  ED Course as of 12/30/21 0836   Thu Dec 30, 2021   0705 Sign out to E Mian - thoughts of jumping in front of a car - 201 signed, bed search     Procedures  MDM  Number of Diagnoses or Management Options  Depression: established and worsening  Suicidal ideation: new and requires workup     Amount and/or Complexity of Data Reviewed  Obtain history from someone other than the patient: yes  Discuss the patient with other providers: yes    Patient Progress  Patient progress: improved          Disposition  Final diagnoses:   Suicidal ideation   Depression     Time reflects when diagnosis was documented in both MDM as applicable and the Disposition within this note     Time User Action Codes Description Comment    12/29/2021  7:27 PM Luis Puga [Z38 178] Suicidal ideation     12/29/2021  7:27 PM Luis Puga [place]  A] Depression       ED Disposition     ED Disposition Condition Date/Time Comment    Transfer to [facility] DAVID Garrett 7066 Dec 29, 2021  7:31 PM Margarito Hudson should be transferred out to ** crisis bed search * and has been medically cleared  MD Documentation      Most Recent Value   Sending MD Donna Sanchez DO      Follow-up Information    None       Patient's Medications   Discharge Prescriptions    No medications on file     No discharge procedures on file         ED Provider  Electronically Signed by     Chay Pratt DO  12/30/21 8816

## 2021-12-30 NOTE — ED NOTES
[facility]:    Recipient ID: [id number]  Medical: Medicare A&B  [facility]

## 2021-12-30 NOTE — ED PROVIDER NOTES
History  Chief Complaint   Patient presents with    Psychiatric Evaluation     To ED with c/o suicidal thoughts  States that she misses her family and her boyfriend Wishes to jump in front of a car  This is a 55-year-old female with a history of bipolar disease, depression,and posttraumatic stress disorder who presents via ambulance from [facility] for crisis evaluation  She is stating that she is having suicidal thoughts to jump in front of a car  She was discharged from [facility] 11 days ago with similar presentation and medical clearance  She is feeling sad about the holidays      History provided by:  Patient and EMS personnel  Medical Problem  Location:  Generalized  Quality:  Anxiety depression and suicidal ideation  Severity:  Unable to specify  Onset quality:  Gradual  Duration:  2 days  Timing:  Constant  Progression:  Worsening  Chronicity:  Recurrent  Context:  Chronic recurrent depression and suicidal ideation  Relieved by:  Nothing  Worsened by:  Holiday season      Prior to Admission Medications   Prescriptions Last Dose Informant Patient Reported? Taking?    FLUoxetine (PROzac) 40 MG capsule  Self No No   Sig: Take 2 capsules (80 mg total) by mouth daily   aspirin (ECOTRIN LOW STRENGTH) 81 mg EC tablet  Self No No   Sig: Take 1 tablet (81 mg total) by mouth daily   atorvastatin (LIPITOR) 40 mg tablet  Self No No   Sig: Take 1 tablet (40 mg total) by mouth daily with dinner   buPROPion (WELLBUTRIN XL) 300 mg 24 hr tablet  Self No No   Sig: Take 1 tablet (300 mg total) by mouth daily   calcium carbonate (OYSTER SHELL,OSCAL) 500 mg  Self No No   Sig: Take 1 tablet by mouth daily with breakfast   cholecalciferol (VITAMIN D3) 1,000 units tablet  Self No No   Sig: Take 1 tablet (1,000 Units total) by mouth daily   divalproex sodium (DEPAKOTE ER) 500 mg 24 hr tablet  Self No No   Sig: Take 4 tablets (2,000 mg total) by mouth daily at bedtime   fluPHENAZine (PROLIXIN) 10 MG tablet  Self No No   Sig: Take 1 tablet (10 mg total) by mouth daily at bedtime   fluPHENAZine (PROLIXIN) 5 mg tablet  Self No No   Sig: Take 1 tablet (5 mg total) by mouth 2 (two) times a day before breakfast and lunch   lisinopril (ZESTRIL) 20 mg tablet  Self No No   Sig: Take 1 tablet (20 mg total) by mouth daily   melatonin 3 mg  Self No No   Sig: Take 1 tablet (3 mg total) by mouth daily at bedtime   nystatin (MYCOSTATIN) powder  Self No No   Sig: Apply topically 2 (two) times a day   polyethylene glycol (MIRALAX) 17 g packet  Self No No   Sig: Take 17 g by mouth daily   prazosin (MINIPRESS) 2 mg capsule  Self No No   Sig: Take 1 capsule (2 mg total) by mouth daily at bedtime      Facility-Administered Medications: None       Past Medical History:   Diagnosis Date    Anxiety     Bipolar 1 disorder (HCC)     Bipolar affective disorder, depressed, severe ([facility] ) 10/10/2021    [facility]     CAD (coronary artery disease)     Cognitive impairment     Depression     Dyslipidemia     GERD (gastroesophageal reflux disease)     Hypertension     Obesity     Psychiatric disorder     PTSD (post-traumatic stress disorder)     Schizoaffective disorder ([facility] )     Seizure ([facility] )        Past Surgical History:   Procedure Laterality Date    APPENDECTOMY      PATIENT DENIES HAVING APPENDIX REMOVED    CHOLECYSTECTOMY      FOOT SURGERY Right        History reviewed  No pertinent family history  I have reviewed and agree with the history as documented      E-Cigarette/Vaping    E-Cigarette Use Never User      E-Cigarette/Vaping Substances    Nicotine No     THC No     CBD No     Flavoring No     Other No     Unknown No      Social History     Tobacco Use    Smoking status: Former Smoker    Smokeless tobacco: Never Used   Vaping Use    Vaping Use: Never used   Substance Use Topics    Alcohol use: Not Currently    Drug use: Not Currently       Review of Systems   Psychiatric/Behavioral: Positive for dysphoric mood and suicidal ideas  All other systems reviewed and are negative  Physical Exam  Physical Exam  Vitals and nursing note reviewed  Constitutional:       General: She is not in acute distress  Appearance: She is not ill-appearing, toxic-appearing or diaphoretic  HENT:      Head: Normocephalic and atraumatic  Right Ear: Tympanic membrane, ear canal and external ear normal       Left Ear: Tympanic membrane, ear canal and external ear normal       Nose: Nose normal    Eyes:      General: No scleral icterus  Right eye: No discharge  Left eye: No discharge  Extraocular Movements: Extraocular movements intact  Pupils: Pupils are equal, round, and reactive to light  Cardiovascular:      Rate and Rhythm: Normal rate and regular rhythm  Pulses: Normal pulses  Heart sounds: Normal heart sounds  No murmur heard  No friction rub  No gallop  Pulmonary:      Effort: Pulmonary effort is normal  No respiratory distress  Breath sounds: Normal breath sounds  No stridor  No wheezing, rhonchi or rales  Abdominal:      General: There is no distension  Palpations: Abdomen is soft  Tenderness: There is no abdominal tenderness  There is no guarding or rebound  Musculoskeletal:         General: No swelling, tenderness, deformity or signs of injury  Normal range of motion  Cervical back: Normal range of motion and neck supple  No rigidity or tenderness  Right lower leg: No edema  Left lower leg: No edema  Skin:     General: Skin is warm and dry  Coloration: Skin is not jaundiced  Findings: No bruising, erythema or rash  Neurological:      General: No focal deficit present  Mental Status: She is alert and oriented to person, place, and time  Cranial Nerves: No cranial nerve deficit  Sensory: No sensory deficit  Motor: No weakness        Coordination: Coordination normal    Psychiatric: Behavior: Behavior normal          Thought Content:  Thought content normal          Vital Signs  ED Triage Vitals [12/29/21 1925]   Temperature Pulse Respirations Blood Pressure SpO2   (!) 97 3 °F (36 3 °C) 77 20 123/70 100 %      Temp Source Heart Rate Source Patient Position - Orthostatic VS BP Location FiO2 (%)   Tympanic Monitor Sitting Right arm --      Pain Score       No Pain           Vitals:    12/29/21 1925 12/29/21 2115 12/30/21 0832   BP: 123/70 124/61 150/88   Pulse: 77 77 77   Patient Position - Orthostatic VS: Sitting Sitting Sitting         Visual Acuity      ED Medications  Medications   calcium carbonate (TUMS) chewable tablet 500 mg (500 mg Oral Given 12/30/21 0843)   buPROPion (WELLBUTRIN XL) 24 hr tablet 300 mg (300 mg Oral Given 12/30/21 0843)   atorvastatin (LIPITOR) tablet 40 mg (has no administration in time range)   aspirin chewable tablet 81 mg (81 mg Oral Given 12/30/21 0843)   fluPHENAZine (PROLIXIN) tablet 10 mg (10 mg Oral Given 12/29/21 2204)   FLUoxetine (PROzac) capsule 10 mg (10 mg Oral Given 12/29/21 2204)   valproic acid (DEPAKENE) capsule 2,000 mg (2,000 mg Oral Given 12/29/21 2204)   cholecalciferol (VITAMIN D3) tablet 1,000 Units (1,000 Units Oral Given 12/29/21 2205)   prazosin (MINIPRESS) capsule 2 mg (2 mg Oral Given 12/29/21 2205)   polyethylene glycol (MIRALAX) packet 17 g (17 g Oral Given 12/30/21 0843)   melatonin tablet 3 mg (3 mg Oral Not Given 12/29/21 2205)   lisinopril (ZESTRIL) tablet 20 mg (20 mg Oral Given 12/30/21 0843)   fluPHENAZine (PROLIXIN) tablet 5 mg (5 mg Oral Given 12/30/21 9644)       Diagnostic Studies  Results Reviewed     Procedure Component Value Units Date/Time    COVID/FLU/RSV - 2 hour TAT [[id number]]  (Normal) Collected: 12/29/21 2019    Lab Status: Final result Specimen: Nares from Nasopharyngeal Swab Updated: 12/29/21 2119     SARS-CoV-2 Negative     INFLUENZA A PCR Negative     INFLUENZA B PCR Negative     RSV PCR Negative    Narrative: FOR PEDIATRIC PATIENTS - copy/paste COVID Guidelines URL to browser: [email or web address]  [email or web address]     This test has been authorized by FDA under an EUA (Emergency Use Assay) for use by authorized laboratories  Clinical caution and judgement should be used with the interpretation of these results with consideration of the clinical impression and other laboratory testing  Testing reported as "Positive" or "Negative" has been proven to be accurate according to standard laboratory validation requirements  All testing is performed with control materials showing appropriate reactivity at standard intervals  Rapid drug screen, urine [[id number]]  (Normal) Collected: 12/29/21 2105    Lab Status: Final result Specimen: Urine, Other Updated: 12/29/21 2118     Amph/Meth UR Negative     Barbiturate Ur Negative     Benzodiazepine Urine Negative     Cocaine Urine Negative     Methadone Urine Negative     Opiate Urine Negative     PCP Ur Negative     THC Urine Negative     Oxycodone Urine Negative    Narrative:      FOR MEDICAL PURPOSES ONLY  IF CONFIRMATION NEEDED PLEASE CONTACT THE LAB WITHIN 5 DAYS      Drug Screen Cutoff Levels:  AMPHETAMINE/METHAMPHETAMINES  1000 ng/mL  BARBITURATES     200 ng/mL  BENZODIAZEPINES     200 ng/mL  COCAINE      300 ng/mL  METHADONE      300 ng/mL  OPIATES      300 ng/mL  PHENCYCLIDINE     25 ng/mL  THC       50 ng/mL  OXYCODONE      100 ng/mL    POCT alcohol breath test [[id number]]  (Normal) Resulted: 12/29/21 2023    Lab Status: Final result Updated: 12/29/21 2024     EXTBreath Alcohol 0                 No orders to display              Procedures  Procedures         ED Course  ED Course as of 12/30/21 1218   Wed Dec 29, 2021   1930 Patient can be considered medically cleared for crisis placement                                             MDM  Number of Diagnoses or Management Options  Diagnosis management comments: Suicidal ideation workup in progress will consult crisis      Disposition  Final diagnoses:   Suicidal ideation   Depression     Time reflects when diagnosis was documented in both MDM as applicable and the Disposition within this note     Time User Action Codes Description Comment    12/29/2021  7:27 PM Cesar Krishna Add [Z40 900] Suicidal ideation     12/29/2021  7:27 PM Cesar Krishna Add Onnjel Grumbling  A] Depression       ED Disposition     ED Disposition Condition Date/Time Comment    Transfer to [facility] Dec 29, 2021  7:31 PM Julio C Seip should be transferred out to ** crisis bed search * and has been medically cleared  MD Documentation      Most Recent Value   Sending MD Bulah Kanner, DO      Follow-up Information    None         Patient's Medications   Discharge Prescriptions    No medications on file       No discharge procedures on file      PDMP Review     None          ED Provider  Electronically Signed by           Lloyd Ruggiero DO  12/30/21 5374

## 2021-12-30 NOTE — QUICK NOTE
64-year-old female with a history of bipolar disorder, PTSD, borderline personality disorder who is well known to this psychiatric service presents with recurrent SI and command auditory hallucinations to kill herself  Patient has been accepted to [address] [place] [address] cue to Moises Borden   I did briefly assessed her in person to confirm that she merits inpatient care  She confirms that she remains suicidal with plan to jump in front of a car or to slash her wrists  She is requesting inpatient admission  She is noted to be jovial, smiling and with an incongruent affect during our conversation  She is able to contract for safety  Will defer formal evaluation as patient will be transferred to our unit and receive her H&P there  This will be her 3rd psych admission this month and her 5th psych admission of the past 3 months  Discussed with attending, Dr Marisela Blackman, who is in agreement with plan  Discussed with ED physician, Dr Danilo Trejo, as well

## 2021-12-30 NOTE — ED NOTES
Bed Search    In review, clinical faxed:  [facility] ([address])  [facility] ([address])  [facility]    No beds:  [address] [facility]

## 2021-12-30 NOTE — ED NOTES
Patient is accepted at [facility]  Patient is accepted by Javier Kenyon PA-C with DR Charu Holder as the attending per Chloé Billings  Transportation is arranged with [facility]  Transportation is scheduled for **  Patient may go to the floor at **  Nurse report is to be called to [phone] prior to patient transfer

## 2021-12-30 NOTE — ED NOTES
[facility]: Per Cortney Licea, no beds  [place]: Per Victoria Veloz, no beds  [facility]: Per Dalila, no beds  [place]: Per Travis Goddard, no beds   [facility]: Per Kelly Mcmillan, not accepting pts  [facility]: Per Mallory Hastings, no beds   [facility]: Per Rose Marie Rosa, no beds  [place]: Per Anna Drake, no beds  [place]: No answer

## 2021-12-30 NOTE — ED NOTES
Crisis met with Pt who reported suicidal ideation with plan  Pt reported that she wanted to self harm and jump in front of a car  Pt reported that she would do this because she misses her family  Pt reported paranoia evidenced by her statements that people are out to get her  Pt reported being linked with outpatient via [address]  Pt reported no legal or substance abuse  Pt reported periodic smoking  Pt reported no thoughts of hurting others or intent and denied hallucinations or delusions  Pt willing to sign 201 and stated that she understood how 72 hour notice worked  201 signed by attending

## 2021-12-30 NOTE — ED NOTES
Patient has pants, shirt, jacket and word search puzzle for belongings  Placed in  [facility], patient allowed to have word search with her        Jose Faust RN  12/29/21 6207

## 2021-12-30 NOTE — EMTALA/ACUTE CARE TRANSFER
[facility] EMERGENCY DEPARTMENT  [address] [facility] [address] [place] [address]  Dept: [phone]      EMTALA TRANSFER CONSENT    NAME Leydi Voss                                         1970                              MRN [id number]    I have been informed of my rights regarding examination, treatment, and transfer   by Dr Elva Steen MD    Benefits: Specialized equipment and/or services available at the receiving facility (Include comment)________________________ (inpatient psychiatry)    Risks: Potential for delay in receiving treatment,Potential deterioration of medical condition,Increased discomfort during transfer,Possible worsening of condition or death during transfer      Consent for Transfer:  I acknowledge that my medical condition has been evaluated and explained to me by the emergency department physician or other qualified medical person and/or my attending physician, who has recommended that I be transferred to the service of  Accepting Physician: John Payton at [address], [address] : [address]  The above potential benefits of such transfer, the potential risks associated with such transfer, and the probable risks of not being transferred have been explained to me, and I fully understand them  The doctor has explained that, in my case, the benefits of transfer outweigh the risks  I agree to be transferred  I authorize the performance of emergency medical procedures and treatments upon me in both transit and upon arrival at the receiving facility  Additionally, I authorize the release of any and all medical records to the receiving facility and request they be transported with me, if possible  I understand that the safest mode of transportation during a medical emergency is an ambulance and that the Hospital advocates the use of this mode of transport   Risks of traveling to the receiving facility by car, including absence of medical control, life sustaining equipment, such as oxygen, and medical personnel has been explained to me and I fully understand them  (PRAKASH CORRECT BOX BELOW)  [  ]  I consent to the stated transfer and to be transported by ambulance/helicopter  [  ]  I consent to the stated transfer, but refuse transportation by ambulance and accept full responsibility for my transportation by car  I understand the risks of non-ambulance transfers and I exonerate the Hospital and its staff from any deterioration in my condition that results from this refusal     X___________________________________________    DATE  21  TIME________  Signature of patient or legally responsible individual signing on patient behalf           RELATIONSHIP TO PATIENT_________________________          Provider Certification    NAME Leydi Lira Asp                                         1970                              MRN [id number]    A medical screening exam was performed on the above named patient  Based on the examination:    Condition Necessitating Transfer The primary encounter diagnosis was Suicidal ideation  Diagnoses of Depression, Medical clearance for psychiatric admission, Hyperlipidemia, and Essential hypertension were also pertinent to this visit      Patient Condition: The patient has been stabilized such that within reasonable medical probability, no material deterioration of the patient condition or the condition of the unborn child(courtney) is likely to result from the transfer    Reason for Transfer:      Transfer Requirements: [address]   · Space available and qualified personnel available for treatment as acknowledged by    · Agreed to accept transfer and to provide appropriate medical treatment as acknowledged by       Cornell Corona  · Appropriate medical records of the examination and treatment of the patient are provided at the time of transfer   [address], [address] _______  · Transfer will be performed by qualified personnel from    and appropriate transfer equipment as required, including the use of necessary and appropriate life support measures  Provider Certification: I have examined the patient and explained the following risks and benefits of being transferred/refusing transfer to the patient/family:  General risk, such as traffic hazards, adverse weather conditions, rough terrain or turbulence, possible failure of equipment (including vehicle or aircraft), or consequences of actions of persons outside the control of the transport personnel,Unanticipated needs of medical equipment and personnel during transport,The possibility of a transport vehicle being unavailable,Risk of worsening condition      Based on these reasonable risks and benefits to the patient and/or the unborn child(courtney), and based upon the information available at the time of the patients examination, I certify that the medical benefits reasonably to be expected from the provision of appropriate medical treatments at another medical facility outweigh the increasing risks, if any, to the individuals medical condition, and in the case of labor to the unborn child, from effecting the transfer      X____________________________________________ DATE 12/30/21        TIME_______      ORIGINAL - SEND TO MEDICAL RECORDS   COPY - SEND WITH PATIENT DURING TRANSFER

## 2021-12-31 PROBLEM — F31.5 BIPOLAR AFFECTIVE DISORDER, DEPRESSED, SEVERE, WITH PSYCHOTIC BEHAVIOR (HCC): Status: ACTIVE | Noted: 2021-10-10

## 2021-12-31 LAB
ALBUMIN SERPL BCP-MCNC: 3 G/DL (ref 3.5–5)
ALP SERPL-CCNC: 52 U/L (ref 46–116)
ALT SERPL W P-5'-P-CCNC: 14 U/L (ref 12–78)
ANION GAP SERPL CALCULATED.3IONS-SCNC: 9 MMOL/L (ref 4–13)
ANISOCYTOSIS BLD QL SMEAR: PRESENT
AST SERPL W P-5'-P-CCNC: 6 U/L (ref 5–45)
BASOPHILS # BLD MANUAL: 0 THOUSAND/UL (ref 0–0.1)
BASOPHILS NFR MAR MANUAL: 0 % (ref 0–1)
BILIRUB SERPL-MCNC: 0.4 MG/DL (ref 0.2–1)
BUN SERPL-MCNC: 13 MG/DL (ref 5–25)
CALCIUM ALBUM COR SERPL-MCNC: 9.2 MG/DL (ref 8.3–10.1)
CALCIUM SERPL-MCNC: 8.4 MG/DL (ref 8.3–10.1)
CHLORIDE SERPL-SCNC: 98 MMOL/L (ref 100–108)
CHOLEST SERPL-MCNC: 134 MG/DL
CO2 SERPL-SCNC: 28 MMOL/L (ref 21–32)
CREAT SERPL-MCNC: 0.69 MG/DL (ref 0.6–1.3)
EOSINOPHIL # BLD MANUAL: 0.22 THOUSAND/UL (ref 0–0.4)
EOSINOPHIL NFR BLD MANUAL: 4 % (ref 0–6)
ERYTHROCYTE [DISTWIDTH] IN BLOOD BY AUTOMATED COUNT: 14.7 % (ref 11.6–15.1)
EST. AVERAGE GLUCOSE BLD GHB EST-MCNC: 91 MG/DL
GFR SERPL CREATININE-BSD FRML MDRD: 101 ML/MIN/1.73SQ M
GIANT PLATELETS BLD QL SMEAR: PRESENT
GLUCOSE P FAST SERPL-MCNC: 80 MG/DL (ref 65–99)
GLUCOSE SERPL-MCNC: 80 MG/DL (ref 65–140)
HBA1C MFR BLD: 4.8 %
HCT VFR BLD AUTO: 34.5 % (ref 34.8–46.1)
HDLC SERPL-MCNC: 38 MG/DL
HGB BLD-MCNC: 11.5 G/DL (ref 11.5–15.4)
LDLC SERPL CALC-MCNC: 65 MG/DL (ref 0–100)
LG PLATELETS BLD QL SMEAR: PRESENT
LYMPHOCYTES # BLD AUTO: 1.66 THOUSAND/UL (ref 0.6–4.47)
LYMPHOCYTES # BLD AUTO: 30 % (ref 14–44)
MCH RBC QN AUTO: 31.9 PG (ref 26.8–34.3)
MCHC RBC AUTO-ENTMCNC: 33.3 G/DL (ref 31.4–37.4)
MCV RBC AUTO: 96 FL (ref 82–98)
MONOCYTES # BLD AUTO: 0.39 THOUSAND/UL (ref 0–1.22)
MONOCYTES NFR BLD: 7 % (ref 4–12)
NEUTROPHILS # BLD MANUAL: 3.27 THOUSAND/UL (ref 1.85–7.62)
NEUTS SEG NFR BLD AUTO: 59 % (ref 43–75)
NONHDLC SERPL-MCNC: 96 MG/DL
PLATELET # BLD AUTO: 164 THOUSANDS/UL (ref 149–390)
PLATELET BLD QL SMEAR: ADEQUATE
PMV BLD AUTO: 9.4 FL (ref 8.9–12.7)
POTASSIUM SERPL-SCNC: 3.8 MMOL/L (ref 3.5–5.3)
PROT SERPL-MCNC: 6.2 G/DL (ref 6.4–8.2)
RBC # BLD AUTO: 3.61 MILLION/UL (ref 3.81–5.12)
RBC MORPH BLD: PRESENT
SODIUM SERPL-SCNC: 135 MMOL/L (ref 136–145)
TRIGL SERPL-MCNC: 155 MG/DL
WBC # BLD AUTO: 5.54 THOUSAND/UL (ref 4.31–10.16)

## 2021-12-31 PROCEDURE — 85007 BL SMEAR W/DIFF WBC COUNT: CPT | Performed by: PHYSICIAN ASSISTANT

## 2021-12-31 PROCEDURE — 99222 1ST HOSP IP/OBS MODERATE 55: CPT | Performed by: PSYCHIATRY & NEUROLOGY

## 2021-12-31 PROCEDURE — 99222 1ST HOSP IP/OBS MODERATE 55: CPT | Performed by: PHYSICIAN ASSISTANT

## 2021-12-31 PROCEDURE — 85027 COMPLETE CBC AUTOMATED: CPT | Performed by: PHYSICIAN ASSISTANT

## 2021-12-31 PROCEDURE — 86592 SYPHILIS TEST NON-TREP QUAL: CPT | Performed by: PHYSICIAN ASSISTANT

## 2021-12-31 PROCEDURE — 80061 LIPID PANEL: CPT | Performed by: PHYSICIAN ASSISTANT

## 2021-12-31 PROCEDURE — 83036 HEMOGLOBIN GLYCOSYLATED A1C: CPT | Performed by: PHYSICIAN ASSISTANT

## 2021-12-31 PROCEDURE — 80053 COMPREHEN METABOLIC PANEL: CPT | Performed by: PHYSICIAN ASSISTANT

## 2021-12-31 RX ORDER — FLUPHENAZINE HYDROCHLORIDE 5 MG/1
15 TABLET ORAL
Status: DISCONTINUED | OUTPATIENT
Start: 2021-12-31 | End: 2022-01-06 | Stop reason: HOSPADM

## 2021-12-31 RX ORDER — FLUOXETINE HYDROCHLORIDE 20 MG/1
20 CAPSULE ORAL
Status: DISCONTINUED | OUTPATIENT
Start: 2021-12-31 | End: 2022-01-03

## 2021-12-31 RX ADMIN — FLUOXETINE 20 MG: 20 CAPSULE ORAL at 21:50

## 2021-12-31 RX ADMIN — NYSTATIN: 100000 CREAM TOPICAL at 21:55

## 2021-12-31 RX ADMIN — ATORVASTATIN CALCIUM 40 MG: 40 TABLET, FILM COATED ORAL at 17:16

## 2021-12-31 RX ADMIN — DICLOFENAC SODIUM 2 G: 10 GEL TOPICAL at 22:08

## 2021-12-31 RX ADMIN — VALPROIC ACID 2000 MG: 250 CAPSULE ORAL at 21:52

## 2021-12-31 RX ADMIN — ASPIRIN 81 MG CHEWABLE TABLET 81 MG: 81 TABLET CHEWABLE at 10:09

## 2021-12-31 RX ADMIN — Medication 1000 UNITS: at 21:50

## 2021-12-31 RX ADMIN — LISINOPRIL 20 MG: 20 TABLET ORAL at 10:08

## 2021-12-31 RX ADMIN — BUPROPION HYDROCHLORIDE 300 MG: 300 TABLET, FILM COATED, EXTENDED RELEASE ORAL at 10:08

## 2021-12-31 RX ADMIN — FLUPHENAZINE HYDROCHLORIDE 15 MG: 5 TABLET, FILM COATED ORAL at 21:51

## 2021-12-31 RX ADMIN — ACETAMINOPHEN 975 MG: 325 TABLET, FILM COATED ORAL at 14:32

## 2021-12-31 RX ADMIN — FLUPHENAZINE HYDROCHLORIDE 5 MG: 5 TABLET, FILM COATED ORAL at 14:31

## 2021-12-31 RX ADMIN — FLUPHENAZINE HYDROCHLORIDE 5 MG: 5 TABLET, FILM COATED ORAL at 10:08

## 2021-12-31 RX ADMIN — Medication 3 MG: at 21:51

## 2021-12-31 NOTE — ASSESSMENT & PLAN NOTE
· Vital signs stable at time of assessment  · CBC, CMP, TSH pending  · EKG pending  · Patient appears medically stable at this time for inpatient psychiatric treatment

## 2021-12-31 NOTE — PLAN OF CARE
Pt is a new admission  Problem: SELF HARM/SUICIDALITY  Goal: Will have no self-injury during hospital stay  Description: INTERVENTIONS:  - Q 15 MINUTES: Routine safety checks  - Q WAKING SHIFT & PRN: Assess risk to determine if routine checks are adequate to maintain patient safety  - Encourage patient to participate actively in care by formulating a plan to combat response to suicidal ideation, identify supports and resources  Outcome: Progressing     Problem: DEPRESSION  Goal: Will be euthymic at discharge  Description: INTERVENTIONS:  - Administer medication as ordered  - Provide emotional support via 1:1 interaction with staff  - Encourage involvement in milieu/groups/activities  - Monitor for social isolation  Outcome: Progressing     Problem: BEHAVIOR  Goal: Pt/Family maintain appropriate behavior and adhere to behavioral management agreement, if implemented  Description: INTERVENTIONS:  - Assess the family dynamic   - Encourage verbalization of thoughts and concerns in a socially appropriate manner  - Assess patient/family's coping skills and non-compliant behavior (including use of illegal substances)  - Utilize positive, consistent limit setting strategies supporting safety of patient, staff and others  - Initiate consult with Case Management, Spiritual Care or other ancillary services as appropriate  - If a patient's/visitor's behavior jeopardizes the safety of the patient, staff, or others, refer to organization procedure     - Notify Security of behavior or suspected illegal substances which indicate the need for search of the patient and/or belongings  - Encourage participation in the decision making process about a behavioral management agreement; implement if patient meets criteria  Outcome: Progressing     Problem: ANXIETY  Goal: Will report anxiety at manageable levels  Description: INTERVENTIONS:  - Administer medication as ordered  - Teach and encourage coping skills  - Provide emotional support  - Assess patient/family for anxiety and ability to cope  Outcome: Progressing  Goal: By discharge: Patient will verbalize 2 strategies to deal with anxiety  Description: Interventions:  - Identify any obvious source/trigger to anxiety  - Staff will assist patient in applying identified coping technique/skills  - Encourage attendance of scheduled groups and activities  Outcome: Progressing     Problem: SLEEP DISTURBANCE  Goal: Will exhibit normal sleeping pattern  Description: Interventions:  -  Assess the patients sleep pattern, noting recent changes  - Administer medication as ordered  - Decrease environmental stimuli, including noise, as appropriate during the night  - Encourage the patient to actively participate in unit groups and or exercise during the day to enhance ability to achieve adequate sleep at night  - Assess the patient, in the morning, encouraging a description of sleep experience  Outcome: Progressing

## 2021-12-31 NOTE — NURSING NOTE
Pt is a 201 from [facility]  [facility] (-)  Pt came to the ED after having an increase in CAH to hurt herself  Pt began having SI with a plan to jump in front of a car  Recently discharged from this facility on 12/18  Pt admits to currently having  telling her to harm herself, but verbally contracts for safety on the unit  Pt denies any further questions or concerns at this time
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
## 2021-12-31 NOTE — CMS CERTIFICATION NOTE
Certification: Based upon physical, mental and social evaluations, I certify that inpatient psychiatric services are medically necessary for this patient for a duration of greater than 2 midnights for the treatment of bipolar affective disorder, depressed, severe, with psychotic behavior      Gambia C Holter,

## 2021-12-31 NOTE — ASSESSMENT & PLAN NOTE
· Multiple psychiatric inpatient admissions  · Reported to the ED due to SI, AH  · Further plan per psychiatry

## 2021-12-31 NOTE — CASE MANAGEMENT
Readmit score:  25 (RED)   Confirmed Address:        [facility]: [address] [facility])  [place], [place], [address]   Resides in the home with:    SHELLIE VELÁSQUEZ [facility] - multiple unrelated individuals   Pt does have her own room with her own mini fridge & TV  Pt said that she thinks she would like to have a roommate, as she gets lonely  Pt Will Return Home at Discharge: Yes   Confirmed Phone Number: (residence) [phone] - Sanam ext  [phone]   Confirmed Email Address: None    Marital Status:         Children: Single, never      None   Family History:            Adopted mother -   Does have some aunts but minimally involved  Commitment Status: 201   Admitted from:    [facility] on 2021 at 1922   Presenting C/O:       Pt reporting, "I got suicidal again"  Pt said that she missed her family, but not able to identify any other stressors/triggers  Past Inpatient Tx:    -: [facility]  -2021: [facility]  -: [facility]  10/9-15/2021: [facility]  2021: Norma Tabor  2021: [place]  2021: [place]  2021: Antonio Gann  : [place]  2021: Lisa Page  -: [facility]  Pt reported first hospitalization at age 18 y/o   Current outpatient:     Psychiatrist:  Walt Molina - Dr Elvis Joiner      Therapist:  Claudio Daniels      ACT/ICM/CPS/WRT/SC: N/A has residential care coordinator - [facility]   PCP:    Unsure   Hx:    Pt saw orthopedic surgeon on  & has been wearing a sneaker & denied any pain from broken toe    Pt reported last seizure was over 4 yrs ago   Medications:    Pt said that she takes her medications   Pharmacy:    [facility]  F  [phone]   Spirituality/Bahai:    Sabianist   Education:    12th grade / special education   Work/Income:     Preferred time for appts: SSDI - unknown amount        Residence schedules   Legal:      Probation/[place] Ofc: Pt denied        N/A Access to Firearms:    Pt denied   Transportation:    Residence provides transportation as needed   Referrals Needed: None - Pt lives in a [facility] who provide for all her needs  Edwin Barone has outpatient at [facility]  Transport at Discharge:    [facility] will transport home  [facility] PRECIOUS: N/A   Emergency Contact:     Sandhya Collins(aunt)[phone]      ROIs obtained:       [facility] - outpatient & residential  Caleen Brittle)   Insurance:     Medicare A+B - days exhausted  [facility] - Primary Auth   Audit: 0       PAWSS: 0 BAT: 0 UDS: negative   Substance Abuse: Freq   Amount Last Use Notes:   Heroin           Amp/Meth           Alcohol           Cocaine           Cannabis           Benzodiazepine           Barbituartes           Other           Tobacco Pt denied any current smoking

## 2021-12-31 NOTE — TREATMENT PLAN
TREATMENT PLAN REVIEW - Dg [age] y o  1970 female MRN: [id number]    [address] [place] [address] Room / Bed: [place] [address]/[facility] 794-31 Encounter: [id number]          Admit Date/Time:  12/30/2021  7:30 PM    Treatment Team: Attending Provider: Silvia Paez MD; : Kailey Osborne; Patient Care Technician: ePrry Garcia; Patient Care Technician: Sri Tay; Patient Care Assistant: Ashutosh Ba; Security: Mauricio Mike; Patient Care Technician: Marleny Zambrano;  Registered Nurse: Duyen Ceron, RN; Registered Nurse: Rodriguez Anders, RN; Charge Nurse: Janette Crum RN    Diagnosis: Active Problems:    Medical clearance for psychiatric admission    Essential hypertension    Hyperlipidemia    Class 3 severe obesity due to excess calories without serious comorbidity in adult [facility])    Bipolar affective disorder, depressed, severe ([facility] [address] )      Patient Strengths/Assets: compliant with medication, good support system, motivation for treatment/growth, negotiates basic needs, patient is on a voluntary commitment, patient is willing to work on problems    Patient Barriers/Limitations: poor insight, poor physical health, poor reasoning ability    Short Term Goals: decrease in depressive symptoms, decrease in psychotic symptoms, decrease in suicidal thoughts, decrease in homicidal thoughts, decrease in level of agitation, ability to stay safe on the unit, ability to stay free of restraints, improvement in insight, improvement in reasoning ability, mood stabilization, increase in group attendance, increase in socialization with peers on the unit, acceptance of need for psychiatric treatment, acceptance of psychiatric medications    Long Term Goals: improvement in depression, resolution of depressive symptoms, stabilization of mood, free of suicidal thoughts, free of homicidal thoughts, resolution of psychotic symptoms, improvement in reality testing, improvement in reasoning ability, improved insight, acceptance of need for psychiatric medications, acceptance of need for psychiatric treatment, acceptance of need for psychiatric follow up after discharge, acceptance of psychiatric diagnosis, appropriate interaction with peers, appropriate interaction with family, stable living arrangements upon discharge, establishment of family support upon discharge    Progress Towards Goals: starting psychiatric medications as prescribed, continue psychiatric medications as prescribed    Recommended Treatment: medication management, patient medication education, group therapy, milieu therapy, continued Behavioral Health psychiatric evaluation/assessment process    Treatment Frequency: daily medication monitoring, group and milieu therapy daily, monitoring through interdisciplinary rounds, monitoring through weekly patient care conferences    Expected Discharge Date: TBD per primary team    Discharge Plan: placement in group home, return to previous living arrangement    Treatment Plan Created/Updated By: [place] C Holter, DO

## 2021-12-31 NOTE — NURSING NOTE
Patient appeared asleep without discomfort throughout the night  Will continue to monitor for safety till shift change

## 2021-12-31 NOTE — CONSULTS
[address] [facility] [address] 1970, 49 y o  female MRN: [id number]  Unit/Bed#: [facility] 254-01 Encounter: [id number]  Primary Care Provider: Malcolm Guy MD   Date and time admitted to hospital: 12/30/2021  7:30 PM    Inpatient consult for Medical Clearance for [address] patient  Consult performed by: Jose Adrian PA-C  Consult ordered by: Haley Conklin PA-C        Medical clearance for psychiatric admission  Assessment & Plan  · Vital signs stable at time of assessment  · CBC, CMP, TSH pending  · EKG pending  · Patient appears medically stable at this time for inpatient psychiatric treatment    Essential hypertension  Assessment & Plan  · Blood pressure stable  · Continue lisinopril    Hyperlipidemia  Assessment & Plan  · Continue statin    Bipolar affective disorder, depressed, severe ([facility] [address] )  Assessment & Plan  · Multiple psychiatric inpatient admissions  · Reported to the ED due to SI, AH  · Further plan per psychiatry    Class 3 severe obesity due to excess calories without serious comorbidity in [facility])  Assessment & Plan  · BMI 45 03  · Lifestyle modifications    VTE Prophylaxis:   Patient ambulatory    Recommendations for Discharge:  · Follow-up with PCP after discharge    Counseling / Coordination of Care Time: 20 minutes Greater than 50% of total time spent on patient counseling and coordination of care  Collaboration of Care: Were Recommendations Directly Discussed with Primary Treatment Team? No    History of Present Illness:  Carroll Reddy is a 49 y o  female who is originally admitted to the psychiatry service due to auditory hallucinations  We are consulted for medical clearance  Past medical history significant for bipolar disorder, obesity, hyperlipidemia, hypertension  Patient with multiple inpatient psychiatric admissions  Reports that physically she feels well  Denies chest pain/palpitations, shortness of breath, nausea vomiting, abdominal pain  Requesting Voltaren gel for occasional left ankle discomfort which is chronic  Review of Systems:  Review of Systems   Constitutional: Negative for chills, fatigue, fever and unexpected weight change  HENT: Negative for congestion, sore throat and trouble swallowing  Eyes: Negative for photophobia, pain and visual disturbance  Respiratory: Negative for cough, shortness of breath and wheezing  Cardiovascular: Negative for chest pain, palpitations and leg swelling  Gastrointestinal: Negative for abdominal pain, constipation, diarrhea, nausea and vomiting  Endocrine: Negative for polyuria  Genitourinary: Negative for difficulty urinating, dysuria, flank pain, hematuria and urgency  Musculoskeletal: Negative for back pain, myalgias, neck pain and neck stiffness  Skin: Negative for pallor and rash  Neurological: Negative for dizziness, tremors, syncope, weakness, light-headedness, numbness and headaches  Hematological: Does not bruise/bleed easily  Psychiatric/Behavioral: Positive for hallucinations  Negative for agitation and confusion  Past Medical and Surgical History:   Past Medical History:   Diagnosis Date    Anxiety     Bipolar 1 disorder ([place] [address] )     Bipolar affective disorder, depressed, severe ([place] [address] ) 10/10/2021    Borderline personality disorder ([place] [address] )     CAD (coronary artery disease)     Cognitive impairment     Depression     Dyslipidemia     GERD (gastroesophageal reflux disease)     Hypertension     Obesity     Psychiatric disorder     PTSD (post-traumatic stress disorder)     Schizoaffective disorder (HCC)     Seizure ([place] [address] )        Past Surgical History:   Procedure Laterality Date    APPENDECTOMY      PATIENT DENIES HAVING APPENDIX REMOVED    CHOLECYSTECTOMY      FOOT SURGERY Right        Meds/Allergies:  all medications and allergies reviewed    Allergies:    Allergies   Allergen Reactions    Fish Oil - Food Allergy Other (See Comments)     unknown    Fish-Derived Products - Food Allergy Hives       Social History:  Marital Status: Single  Substance Use History:   Social History     Substance and Sexual Activity   Alcohol Use Not Currently     Social History     Tobacco Use   Smoking Status Light Tobacco Smoker   Smokeless Tobacco Never Used   Tobacco Comment    Pt stated "smokes when stressed"     Social History     Substance and Sexual Activity   Drug Use Not Currently       Family History:  History reviewed  No pertinent family history  Physical Exam:   Vitals:   Blood Pressure: 126/81 (12/30/21 2101)  Pulse: 90 (12/30/21 2101)  Temperature: 98 9 °F (37 2 °C) (12/30/21 1939)  Temp Source: Tympanic (12/30/21 1939)  Respirations: 18 (12/30/21 1939)    Physical Exam  Vitals and nursing note reviewed  Constitutional:       Appearance: Normal appearance  Comments: Appears comfortable, no acute distress   HENT:      Head: Normocephalic  Eyes:      General: No scleral icterus  Extraocular Movements: Extraocular movements intact  Conjunctiva/sclera: Conjunctivae normal    Cardiovascular:      Rate and Rhythm: Normal rate and regular rhythm  Heart sounds: S1 normal and S2 normal    Pulmonary:      Effort: Pulmonary effort is normal       Breath sounds: Normal breath sounds  No wheezing, rhonchi or rales  Abdominal:      General: Bowel sounds are normal       Palpations: Abdomen is soft  Tenderness: There is no abdominal tenderness  There is no guarding or rebound  Musculoskeletal:         General: No swelling, tenderness or deformity  Cervical back: Normal range of motion  Comments: Able to move upper/lower extremities bilaterally, no edema   Skin:     General: Skin is warm and dry  Neurological:      Mental Status: She is alert and oriented to person, place, and time     Psychiatric:         Mood and Affect: Mood normal          Speech: Speech normal          Behavior: Behavior normal           Additional Data:   Lab Results:                    Lab Results   Component Value Date/Time    HGBA1C 5 2 12/21/2019 08:10 AM    HGBA1C 4 8 10/19/2019 07:17 AM               Imaging: No pertinent imaging reviewed  No orders to display       EKG, Pathology, and Other Studies Reviewed on Admission:   · EKG: Pending  ** Please Note: This note may have been constructed using a voice recognition system   **

## 2021-12-31 NOTE — NURSING NOTE
Pt remains pleasant in conversation  States her AH worsened after her most recent discharge from this facility and pt began to have self harming thoughts  Pt states having SI to jump in front of a car  Verbally contracts for safety and states she would like to speak to the doctor today about possibly changing her scheduled medications  Patient reports she has been sleeping well at night  Denies any questions at this time

## 2021-12-31 NOTE — PROGRESS NOTES
Diagnosis of Bipolar affective disorder, depressed, severe reviewed  Short term goals for decrease in depressive symptoms, decrease in psychotic symptoms, decrease in suicidal thoughts, decrease in homicidal thoughts, decrease in level of agitation, ability to stay safe on the unit, ability to stay free of restraints, improvement in insight, improvement in reasoning ability, mood stabilization, increase in group attendance, increase in socialization with peers on the unit, acceptance of need for psychiatric treatment, acceptance of psychiatric medications discussed  All present parties in agreement and treatment plan signed      12/31/21 1350   Team Meeting   Meeting Type Tx Team Meeting   Team Members Present   Team Members Present Physician;Nurse;   Physician Team Member Dr Marcin Kimball Team Member [id number] Management Team Member Juan Carlos   Patient/Family Present   Patient Present No  (pt declined to meet with team)   Patient's Family Present No

## 2022-01-01 PROCEDURE — 99233 SBSQ HOSP IP/OBS HIGH 50: CPT | Performed by: STUDENT IN AN ORGANIZED HEALTH CARE EDUCATION/TRAINING PROGRAM

## 2022-01-01 RX ORDER — BISACODYL 10 MG
10 SUPPOSITORY, RECTAL RECTAL DAILY PRN
Status: DISCONTINUED | OUTPATIENT
Start: 2022-01-01 | End: 2022-01-06 | Stop reason: HOSPADM

## 2022-01-01 RX ORDER — POLYETHYLENE GLYCOL 3350 17 G/17G
17 POWDER, FOR SOLUTION ORAL DAILY PRN
Status: DISCONTINUED | OUTPATIENT
Start: 2022-01-01 | End: 2022-01-06 | Stop reason: HOSPADM

## 2022-01-01 RX ADMIN — POLYETHYLENE GLYCOL 3350 17 G: 17 POWDER, FOR SOLUTION ORAL at 09:33

## 2022-01-01 RX ADMIN — ATORVASTATIN CALCIUM 40 MG: 40 TABLET, FILM COATED ORAL at 17:21

## 2022-01-01 RX ADMIN — Medication 1000 UNITS: at 21:06

## 2022-01-01 RX ADMIN — ACETAMINOPHEN 975 MG: 325 TABLET, FILM COATED ORAL at 17:21

## 2022-01-01 RX ADMIN — FLUOXETINE 20 MG: 20 CAPSULE ORAL at 21:06

## 2022-01-01 RX ADMIN — FLUPHENAZINE HYDROCHLORIDE 15 MG: 5 TABLET, FILM COATED ORAL at 21:06

## 2022-01-01 RX ADMIN — FLUPHENAZINE HYDROCHLORIDE 5 MG: 5 TABLET, FILM COATED ORAL at 12:23

## 2022-01-01 RX ADMIN — ASPIRIN 81 MG CHEWABLE TABLET 81 MG: 81 TABLET CHEWABLE at 09:33

## 2022-01-01 RX ADMIN — FLUPHENAZINE HYDROCHLORIDE 5 MG: 5 TABLET, FILM COATED ORAL at 09:33

## 2022-01-01 RX ADMIN — DICLOFENAC SODIUM 2 G: 10 GEL TOPICAL at 17:32

## 2022-01-01 RX ADMIN — BUPROPION HYDROCHLORIDE 300 MG: 300 TABLET, FILM COATED, EXTENDED RELEASE ORAL at 09:34

## 2022-01-01 RX ADMIN — VALPROIC ACID 2000 MG: 250 CAPSULE ORAL at 21:06

## 2022-01-01 RX ADMIN — LISINOPRIL 20 MG: 20 TABLET ORAL at 09:34

## 2022-01-01 NOTE — NURSING NOTE
Pt given Tylenol 975mg for 10/10 hip pain  Scheduled Voltaren gel also applied  Pt sleeping upon reassessment

## 2022-01-01 NOTE — TREATMENT TEAM
01/01/22 0700   Team Meeting   Meeting Type Daily Rounds   Team Members Present   Team Members Present Physician;Nurse   Physician Team Member Dr Di Foster   Nursing Team Member RV   Patient/Family Present   Patient Present No   Patient's Family Present No     AM rounds:  Fleeting SI, consents for safety on unit  ADLs improved  Visible in milieu, attending groups    Slept

## 2022-01-01 NOTE — PLAN OF CARE
Problem: SELF HARM/SUICIDALITY  Goal: Will have no self-injury during hospital stay  Description: INTERVENTIONS:  - Q 15 MINUTES: Routine safety checks  - Q WAKING SHIFT & PRN: Assess risk to determine if routine checks are adequate to maintain patient safety  - Encourage patient to participate actively in care by formulating a plan to combat response to suicidal ideation, identify supports and resources  Outcome: Progressing     Problem: DEPRESSION  Goal: Will be euthymic at discharge  Description: INTERVENTIONS:  - Administer medication as ordered  - Provide emotional support via 1:1 interaction with staff  - Encourage involvement in milieu/groups/activities  - Monitor for social isolation  Outcome: Progressing     Problem: BEHAVIOR  Goal: Pt/Family maintain appropriate behavior and adhere to behavioral management agreement, if implemented  Description: INTERVENTIONS:  - Assess the family dynamic   - Encourage verbalization of thoughts and concerns in a socially appropriate manner  - Assess patient/family's coping skills and non-compliant behavior (including use of illegal substances)  - Utilize positive, consistent limit setting strategies supporting safety of patient, staff and others  - Initiate consult with Case Management, Spiritual Care or other ancillary services as appropriate  - If a patient's/visitor's behavior jeopardizes the safety of the patient, staff, or others, refer to organization procedure     - Notify Security of behavior or suspected illegal substances which indicate the need for search of the patient and/or belongings  - Encourage participation in the decision making process about a behavioral management agreement; implement if patient meets criteria  Outcome: Progressing     Problem: ANXIETY  Goal: Will report anxiety at manageable levels  Description: INTERVENTIONS:  - Administer medication as ordered  - Teach and encourage coping skills  - Provide emotional support  - Assess patient/family for anxiety and ability to cope  Outcome: Progressing  Goal: By discharge: Patient will verbalize 2 strategies to deal with anxiety  Description: Interventions:  - Identify any obvious source/trigger to anxiety  - Staff will assist patient in applying identified coping technique/skills  - Encourage attendance of scheduled groups and activities  Outcome: Progressing     Problem: SLEEP DISTURBANCE  Goal: Will exhibit normal sleeping pattern  Description: Interventions:  -  Assess the patients sleep pattern, noting recent changes  - Administer medication as ordered  - Decrease environmental stimuli, including noise, as appropriate during the night  - Encourage the patient to actively participate in unit groups and or exercise during the day to enhance ability to achieve adequate sleep at night  - Assess the patient, in the morning, encouraging a description of sleep experience  Outcome: Progressing     Problem: SAFETY ADULT  Goal: Patient will remain free of falls  Description: INTERVENTIONS:  - Educate patient/family on patient safety including physical limitations  - Instruct patient to call for assistance with activity   - Consult OT/PT to assist with strengthening/mobility   - Keep Call bell within reach  - Keep bed low and locked with side rails adjusted as appropriate  - Keep care items and personal belongings within reach  - Initiate and maintain comfort rounds  - Make Fall Risk Sign visible to staff  - Offer Toileting every  Hours, in advance of need  - Initiate/Maintain alarm  - Obtain necessary fall risk management equipment:   - Apply yellow socks and bracelet for high fall risk patients  - Consider moving patient to room near nurses station  Outcome: Progressing

## 2022-01-01 NOTE — PROGRESS NOTES
Progress Note - Behavioral Health     Leydi Yvon Torres 49 y o  female MRN: [id number]   Unit/Bed#: U 254-01 Encounter: [id number]    Subjective    Report from staff regarding this patient received and discussed, and records reviewed prior to seeing this patient  Behavior over the last 24 hours: some improvement  Limited participation in milieu  Compliant with medications  Has been denying SI to staff today  Leydi reports that she is still feeling depressed today and continues to hear voices  She reports that she has been taking her medications and tolerating well  Prozac was increased yesterday from home dose 10 mg daily  She endorses some SI today but states that she would reach out to staff if she felt that she would do something to harm herself  She reports that her appetite and sleep have been good  She denies HI or VH but endorses ongoing   Discussed monitoring for additional day due to recent Prozac increase and patient agreeable      Sleep: normal  Appetite: normal  Medication side effects: No   ROS: no complaints, all other systems are negative      Objective    Mental Status Evaluation:    Appearance:  disheveled, dressed in hospital attire, overweight   Behavior:  cooperative, calm, somewhat guarded, fair eye contact   Speech:  normal rate and volume   Mood:  depressed   Affect:  constricted, appropriately reactive   Thought Process:  logical, goal directed, linear   Associations: intact associations   Thought Content:  no overt delusions   Perceptual Disturbances: auditory hallucinations, no visual hallucinations, does not appear responding to internal stimuli   Risk Potential: Suicidal ideation - Yes, without plan, contracts for safety on the unit  Homicidal ideation - None  Potential for aggression - Not at present   Sensorium:  oriented to person, place and time/date   Memory:  recent and remote memory grossly intact   Consciousness:  alert and awake   Attention/Concentration: attention span and concentration are age appropriate   Insight:  limited   Judgment: limited   Gait/Station: normal gait/station   Motor Activity: no abnormal movements     Vital signs in last 24 hours:    Temp:  [97 2 °F (36 2 °C)-98 9 °F (37 2 °C)] 98 2 °F (36 8 °C)  HR:  [69-91] 75  Resp:  [15-18] 18  BP: (102-116)/(53-63) 102/58    Laboratory results: I have personally reviewed all pertinent laboratory/tests results    Most Recent Labs:   Lab Results   Component Value Date    WBC 5 54 12/31/2021    RBC 3 61 (L) 12/31/2021    HGB 11 5 12/31/2021    HCT 34 5 (L) 12/31/2021     12/31/2021    RDW 14 7 12/31/2021    NEUTROABS 5 47 12/17/2021    SODIUM 135 (L) 12/31/2021    K 3 8 12/31/2021    CL 98 (L) 12/31/2021    CO2 28 12/31/2021    BUN 13 12/31/2021    CREATININE 0 69 12/31/2021    GLUC 80 12/31/2021    CALCIUM 8 4 12/31/2021    AST 6 12/31/2021    ALT 14 12/31/2021    ALKPHOS 52 12/31/2021    TP 6 2 (L) 12/31/2021    ALB 3 0 (L) 12/31/2021    TBILI 0 40 12/31/2021    CHOLESTEROL 134 12/31/2021    HDL 38 (L) 12/31/2021    TRIG 155 (H) 12/31/2021    LDLCALC 65 12/31/2021    [facility] 96 12/31/2021    VALPROICTOT 97 12/19/2021    KLP7VVBCRMME 1 849 12/17/2021    PREGUR negative 12/17/2021    PREGSERUM Negative 12/04/2021    RPR Non-Reactive 12/18/2021    HGBA1C 4 8 12/31/2021    EAG 91 12/31/2021       Progress Toward Goals: progressing    Assessment/Plan   Principal Problem:    Bipolar affective disorder, depressed, severe, with psychotic behavior ([address] )  Active Problems:    Medical clearance for psychiatric admission    Essential hypertension    Hyperlipidemia    Class 3 severe obesity due to excess calories without serious comorbidity in [facility])      Recommended Treatment:     Planned medication and treatment changes: All current active medications have been reviewed  Encourage group therapy, milieu therapy and occupational therapy  Behavioral Health checks every 15 minutes   Prozac increased yesterday    Continue current medications:    Current Facility-Administered Medications   Medication Dose Route Frequency Provider Last Rate    acetaminophen  650 mg Oral Q6H PRN Augustina Miller PA-C      acetaminophen  650 mg Oral Q4H PRN Augustina Miller PA-C      acetaminophen  975 mg Oral Q6H PRN Augustina Miller PA-C      aluminum-magnesium hydroxide-simethicone  30 mL Oral Q4H PRN Augustina Miller PA-C      artificial tear  1 application Both Eyes R6M PRN Augustina Miller PA-C      aspirin  81 mg Oral Daily Augustina Miller PA-C      atorvastatin  40 mg Oral Daily With Brentania Alvarez PA-C      benztropine  1 mg Intramuscular Q4H PRN Max 6/day Augustina Miller PA-C      benztropine  1 mg Oral Q4H PRN Max 6/day Augustina Miller PA-C      bisacodyl  10 mg Rectal Daily PRN Augustina Miller PA-C      buPROPion  300 mg Oral Daily Augustina Miller PA-C      calcium carbonate  500 mg Oral QAM Augustina Miller PA-C      cholecalciferol  1,000 Units Oral HS Augustina Miller PA-C      Diclofenac Sodium  2 g Topical 4x Daily Lon Crow Reyna PA-C      hydrOXYzine HCL  50 mg Oral Q6H PRN Max 4/day Augustina Miller PA-C      Or    diphenhydrAMINE  50 mg Intramuscular Q6H PRN Augustina Miller PA-C      FLUoxetine  20 mg Oral HS Adal C Holter, DO      fluPHENAZine  15 mg Oral HS Adal C Holter, DO      fluPHENAZine  5 mg Oral BID before breakfast/lunch Augustina Miller PA-C      hydrOXYzine HCL  100 mg Oral Q6H PRN Max 4/day Augustina Miller PA-C      Or    LORazepam  2 mg Intramuscular Q6H PRN Augustina Miller PA-C      hydrOXYzine HCL  25 mg Oral Q6H PRN Max 4/day Augustina Miller PA-C      lisinopril  20 mg Oral Daily Augustina Miller PA-C      melatonin  3 mg Oral HS Augustina Miller PA-C      nystatin   Topical BID Marry Fairbanks MD      OLANZapine  10 mg Oral Q3H PRN Max 3/day Augustina Miller PA-C      Or    OLANZapine  10 mg Intramuscular Q3H PRN Max 3/day Augustina Miller PA-C      OLANZapine 5 mg Oral Q3H PRN Max 6/day Mitchael Gey, PA-C      Or    OLANZapine  5 mg Intramuscular Q3H PRN Max 6/day Mitchael Gey, PA-C      OLANZapine  2 5 mg Oral Q3H PRN Max 8/day Mitchael Gey, PA-C      polyethylene glycol  17 g Oral Daily PRN Mitchael Gey, PA-C      polyethylene glycol  17 g Oral Daily Mitchael Gey, PA-C      prazosin  2 mg Oral HS Mitchael Gey, PA-C      senna-docusate sodium  1 tablet Oral Daily PRN Mitchael Gey, PA-C      traZODone  50 mg Oral HS PRN Mitchael Gey, PA-C      valproic acid  2,000 mg Oral HS Mitchael Gey, PA-C       Risks / Benefits of Treatment:    Risks, benefits, and possible side effects of medications explained to patient and patient verbalizes understanding and agreement for treatment  Counseling / Coordination of Care:    Patient's progress discussed with staff in treatment team meeting  Medications, treatment progress and treatment plan reviewed with patient      Thao Quiñones MD 01/01/22

## 2022-01-01 NOTE — NURSING NOTE
Pt isolative to room, lying in bed  Reports good sleep at night  Scant conversation, states "I'm feeling so-so"  Reports fleeting SI, verbally contracts for safety  Reports AH persist, but are not as negative  Denies HI/VH  Compliant with medications

## 2022-01-02 PROCEDURE — 99233 SBSQ HOSP IP/OBS HIGH 50: CPT | Performed by: STUDENT IN AN ORGANIZED HEALTH CARE EDUCATION/TRAINING PROGRAM

## 2022-01-02 RX ADMIN — VALPROIC ACID 2000 MG: 250 CAPSULE ORAL at 21:26

## 2022-01-02 RX ADMIN — BUPROPION HYDROCHLORIDE 300 MG: 300 TABLET, FILM COATED, EXTENDED RELEASE ORAL at 08:57

## 2022-01-02 RX ADMIN — ATORVASTATIN CALCIUM 40 MG: 40 TABLET, FILM COATED ORAL at 17:17

## 2022-01-02 RX ADMIN — POLYETHYLENE GLYCOL 3350 17 G: 17 POWDER, FOR SOLUTION ORAL at 15:38

## 2022-01-02 RX ADMIN — Medication 1000 UNITS: at 21:27

## 2022-01-02 RX ADMIN — LISINOPRIL 20 MG: 20 TABLET ORAL at 08:57

## 2022-01-02 RX ADMIN — FLUPHENAZINE HYDROCHLORIDE 5 MG: 5 TABLET, FILM COATED ORAL at 08:57

## 2022-01-02 RX ADMIN — NYSTATIN 1 APPLICATION: 100000 CREAM TOPICAL at 17:18

## 2022-01-02 RX ADMIN — FLUPHENAZINE HYDROCHLORIDE 15 MG: 5 TABLET, FILM COATED ORAL at 21:26

## 2022-01-02 RX ADMIN — DICLOFENAC SODIUM 2 G: 10 GEL TOPICAL at 17:18

## 2022-01-02 RX ADMIN — FLUPHENAZINE HYDROCHLORIDE 5 MG: 5 TABLET, FILM COATED ORAL at 12:52

## 2022-01-02 RX ADMIN — PRAZOSIN HYDROCHLORIDE 2 MG: 1 CAPSULE ORAL at 21:41

## 2022-01-02 RX ADMIN — ASPIRIN 81 MG CHEWABLE TABLET 81 MG: 81 TABLET CHEWABLE at 08:57

## 2022-01-02 RX ADMIN — DOCUSATE SODIUM AND SENNOSIDES 1 TABLET: 8.6; 5 TABLET ORAL at 08:58

## 2022-01-02 RX ADMIN — FLUOXETINE 20 MG: 20 CAPSULE ORAL at 21:27

## 2022-01-02 NOTE — NURSING NOTE
Pt calm and cooperative  Scant and isolative to self/room  Reports good sleep over night  Continues to refuse ordered EKG  Reports fleeting SI, verbally contracts for safety  Reports AH "are not so bad right now, I'm doing ok I guess "  Denies HI/VH  Compliant with medications

## 2022-01-02 NOTE — PROGRESS NOTES
Progress Note - Behavioral Health     Leydi Pascual Fulton 49 y o  female MRN: [id number]   Unit/Bed#: U 254-01 Encounter: [id number]    Subjective    Report from staff regarding this patient received and discussed, and records reviewed prior to seeing this patient  Behavior over the last 24 hours: improved  Compliant with medications  Participates more appropriately in milieu  Social with peers  Per staff, patient has been denying SI and has been seclusive to room early in the day but more visible later in day  She refused EKG earlier this morning  Leydi reports that she is feeling depressed today  Earlier seen walking around the unit and smiling  She states that she has been having some suicidal thoughts but denies active thoughts, plans, intentions of harming herself on the unit  She denies any HI  She does endorse ongoing AH  She denies any VH  She states that she refused EKG as she "didn't want one" but is agreeable to doing this tomorrow when explained that this would be important while on Prolixin  She denies any questions or concerns at this time      Sleep: normal  Appetite: normal  Medication side effects: No   ROS: no complaints, all other systems are negative      Objective    Mental Status Evaluation:    Appearance:  casually dressed, dressed appropriately, dressed in hospital attire   Behavior:  cooperative, calm, somewhat guarded, limited eye contact   Speech:  normal rate and volume   Mood:  depressed   Affect:  constricted, mood-congruent, appropriately reactive   Thought Process:  logical, goal directed, linear   Associations: intact associations   Thought Content:  no overt delusions   Perceptual Disturbances: auditory hallucinations, denies visual hallucinations when asked, does not appear responding to internal stimuli   Risk Potential: Suicidal ideation - intermittent SI without intent to harm self, contracts for safety on the unit  Homicidal ideation - None  Potential for aggression - Not at present Sensorium:  oriented to person, place and time/date   Memory:  recent and remote memory grossly intact   Consciousness:  alert and awake   Attention/Concentration: attention span and concentration are age appropriate   Insight:  limited   Judgment: limited   Gait/Station: normal gait/station   Motor Activity: no abnormal movements     Vital signs in last 24 hours:    Temp:  [98 1 °F (36 7 °C)-98 2 °F (36 8 °C)] 98 1 °F (36 7 °C)  HR:  [72-75] 72  Resp:  [16-18] 16  BP: ()/(54-62) 133/62    Laboratory results: I have personally reviewed all pertinent laboratory/tests results    Most Recent Labs:   Lab Results   Component Value Date    WBC 5 54 12/31/2021    RBC 3 61 (L) 12/31/2021    HGB 11 5 12/31/2021    HCT 34 5 (L) 12/31/2021     12/31/2021    RDW 14 7 12/31/2021    NEUTROABS 5 47 12/17/2021    SODIUM 135 (L) 12/31/2021    K 3 8 12/31/2021    CL 98 (L) 12/31/2021    CO2 28 12/31/2021    BUN 13 12/31/2021    CREATININE 0 69 12/31/2021    GLUC 80 12/31/2021    CALCIUM 8 4 12/31/2021    AST 6 12/31/2021    ALT 14 12/31/2021    ALKPHOS 52 12/31/2021    TP 6 2 (L) 12/31/2021    ALB 3 0 (L) 12/31/2021    TBILI 0 40 12/31/2021    CHOLESTEROL 134 12/31/2021    HDL 38 (L) 12/31/2021    TRIG 155 (H) 12/31/2021    LDLCALC 65 12/31/2021    [place] 96 12/31/2021    VALPROICTOT 97 12/19/2021    [id number] 1 849 12/17/2021    PREGUR negative 12/17/2021    PREGSERUM Negative 12/04/2021    RPR Non-Reactive 12/18/2021    HGBA1C 4 8 12/31/2021    EAG 91 12/31/2021       Progress Toward Goals: progressing    Assessment/Plan   Principal Problem:    Bipolar affective disorder, depressed, severe, with psychotic behavior ([address] )  Active Problems:    Medical clearance for psychiatric admission    Essential hypertension    Hyperlipidemia    Class 3 severe obesity due to excess calories without serious comorbidity in adult [facility])      Recommended Treatment:     Planned medication and treatment changes:     All current active medications have been reviewed  Encourage group therapy, milieu therapy and occupational therapy  Behavioral Health checks every 15 minutes   Will order new EKG for tomorrow, 1/3  Prozac recently increased to 20 mg and evening Prolixin increased to 15 mg  May consider dose increases if continuing to endorse depressive symptoms or hallucinations    Continue current medications:    Current Facility-Administered Medications   Medication Dose Route Frequency Provider Last Rate    acetaminophen  650 mg Oral Q6H PRN Sharyne Belt, PA-C      acetaminophen  650 mg Oral Q4H PRN Sharyne Belt, PA-C      acetaminophen  975 mg Oral Q6H PRN Sharyne Belt, PA-C      aluminum-magnesium hydroxide-simethicone  30 mL Oral Q4H PRN Sharyne Belt, PA-C      artificial tear  1 application Both Eyes M4A PRN Sharyne Belt, PA-C      aspirin  81 mg Oral Daily Sharyne Belt, PA-C      atorvastatin  40 mg Oral Daily With Ardell Sicard, PA-C      benztropine  1 mg Intramuscular Q4H PRN Max 6/day Sharyne Belt, PA-C      benztropine  1 mg Oral Q4H PRN Max 6/day Sharyne Belt, PA-C      bisacodyl  10 mg Rectal Daily PRN Sharyne Belt, PA-C      buPROPion  300 mg Oral Daily Sharyne Belt, PA-C      calcium carbonate  500 mg Oral QAM Sharyne Belt, PA-C      cholecalciferol  1,000 Units Oral HS Sharyne Belt, PA-C      Diclofenac Sodium  2 g Topical 4x Daily Jennifer Reyna, PA-C      hydrOXYzine HCL  50 mg Oral Q6H PRN Max 4/day Sharyne Belt, PA-C      Or    diphenhydrAMINE  50 mg Intramuscular Q6H PRN Sharyne Belt, PA-C      FLUoxetine  20 mg Oral HS Adal C Holter, DO      fluPHENAZine  15 mg Oral HS Adal C Holter, DO      fluPHENAZine  5 mg Oral BID before breakfast/lunch Sharyne Belt, PA-C      hydrOXYzine HCL  100 mg Oral Q6H PRN Max 4/day Sharyne Belt, PA-C      Or    LORazepam  2 mg Intramuscular Q6H PRN Sharyne Belt, PA-C      hydrOXYzine HCL  25 mg Oral Q6H PRN Max 4/day Haley Conklin PA-C      lisinopril  20 mg Oral Daily Srini Cha      melatonin  3 mg Oral HS Haley Conklin PA-C      nystatin   Topical BID Maryse Cisneros MD      OLANZapine  10 mg Oral Q3H PRN Max 3/day Haley Conklin PA-C      Or    OLANZapine  10 mg Intramuscular Q3H PRN Max 3/day JASMYN Cha-C      OLANZapine  5 mg Oral Q3H PRN Max 6/day Haley Conklin PA-C      Or    OLANZapine  5 mg Intramuscular Q3H PRN Max 6/day JASMYN Cha-IKER      OLANZapine  2 5 mg Oral Q3H PRN Max 8/day Haley Conklin PA-C      polyethylene glycol  17 g Oral Daily PRN Haley Conklin PA-C      prazosin  2 mg Oral HS Haley Conklin PA-C      senna-docusate sodium  1 tablet Oral Daily PRN Haley Conklin PA-C      traZODone  50 mg Oral HS PRN Haley Conklin PA-C      valproic acid  2,000 mg Oral HS Haley Conklin PA-C       Risks / Benefits of Treatment:    Risks, benefits, and possible side effects of medications explained to patient and patient verbalizes understanding and agreement for treatment  Counseling / Coordination of Care:    Patient's progress discussed with staff in treatment team meeting  Medications, treatment progress and treatment plan reviewed with patient      Niall Browning MD 01/02/22

## 2022-01-02 NOTE — TREATMENT TEAM
01/02/22 0700   Team Meeting   Meeting Type Daily Rounds   Team Members Present   Team Members Present Physician;Nurse   Physician Team Member Dr Yarely Umanzor Team Member CM   Patient/Family Present   Patient Present No   Patient's Family Present No     AM rounds- seclusive to her room throughout the day, denies SI/HI  Reports AH however they are not negative  C/o hip pain-tylenol helpful  In evenings more social and attending groups  Slept well  Refused EKG

## 2022-01-03 LAB — RPR SER QL: NORMAL

## 2022-01-03 PROCEDURE — 99232 SBSQ HOSP IP/OBS MODERATE 35: CPT | Performed by: PHYSICIAN ASSISTANT

## 2022-01-03 RX ORDER — FLUOXETINE HYDROCHLORIDE 20 MG/1
40 CAPSULE ORAL
Status: DISCONTINUED | OUTPATIENT
Start: 2022-01-03 | End: 2022-01-04

## 2022-01-03 RX ORDER — DIVALPROEX SODIUM 500 MG/1
2000 TABLET, EXTENDED RELEASE ORAL DAILY
Status: DISCONTINUED | OUTPATIENT
Start: 2022-01-03 | End: 2022-01-06 | Stop reason: HOSPADM

## 2022-01-03 RX ADMIN — BUPROPION HYDROCHLORIDE 300 MG: 300 TABLET, FILM COATED, EXTENDED RELEASE ORAL at 10:11

## 2022-01-03 RX ADMIN — FLUPHENAZINE HYDROCHLORIDE 5 MG: 5 TABLET, FILM COATED ORAL at 10:11

## 2022-01-03 RX ADMIN — LISINOPRIL 20 MG: 20 TABLET ORAL at 10:11

## 2022-01-03 RX ADMIN — FLUPHENAZINE HYDROCHLORIDE 5 MG: 5 TABLET, FILM COATED ORAL at 13:00

## 2022-01-03 RX ADMIN — ACETAMINOPHEN 650 MG: 325 TABLET, FILM COATED ORAL at 21:40

## 2022-01-03 RX ADMIN — ATORVASTATIN CALCIUM 40 MG: 40 TABLET, FILM COATED ORAL at 17:04

## 2022-01-03 RX ADMIN — PRAZOSIN HYDROCHLORIDE 2 MG: 1 CAPSULE ORAL at 21:40

## 2022-01-03 RX ADMIN — CALCIUM CARBONATE (ANTACID) CHEW TAB 500 MG 500 MG: 500 CHEW TAB at 10:11

## 2022-01-03 RX ADMIN — Medication 1000 UNITS: at 21:40

## 2022-01-03 RX ADMIN — DICLOFENAC SODIUM 2 G: 10 GEL TOPICAL at 10:16

## 2022-01-03 RX ADMIN — FLUOXETINE 40 MG: 20 CAPSULE ORAL at 21:40

## 2022-01-03 RX ADMIN — DIVALPROEX SODIUM 2000 MG: 500 TABLET, EXTENDED RELEASE ORAL at 10:11

## 2022-01-03 RX ADMIN — FLUPHENAZINE HYDROCHLORIDE 15 MG: 5 TABLET, FILM COATED ORAL at 21:40

## 2022-01-03 RX ADMIN — Medication 3 MG: at 21:40

## 2022-01-03 RX ADMIN — ASPIRIN 81 MG CHEWABLE TABLET 81 MG: 81 TABLET CHEWABLE at 10:11

## 2022-01-03 NOTE — PLAN OF CARE
Problem: DEPRESSION  Goal: Will be euthymic at discharge  Description: INTERVENTIONS:  - Administer medication as ordered  - Provide emotional support via 1:1 interaction with staff  - Encourage involvement in milieu/groups/activities  - Monitor for social isolation  Outcome: Progressing     Problem: ANXIETY  Goal: Will report anxiety at manageable levels  Description: INTERVENTIONS:  - Administer medication as ordered  - Teach and encourage coping skills  - Provide emotional support  - Assess patient/family for anxiety and ability to cope  Outcome: Progressing

## 2022-01-03 NOTE — NURSING NOTE
Patient resting in bed all morning, only OOB for meals  Upon prompting pt for morning medications, she interjected mid-sentence and said "nope, nicholas them refused "  Writer attempted to understand why patient was being argumentative over morning medications, she reported "my hip hurts, I can't get up" and writer asked pt if she was able to attend meals in the dining room, to which she shut down and reported she would not be taking meds if not brought to her bedside  After giving pt some time, and provider meeting with pt to discuss importance of her taking meds as scheduled, she did eventually take her morning medicine  Pt gagged multiple times over the small pills, yet did not do so with larger ones  She declined groups today, reports a plan to shower at 230pm and denies present SI, HI, VH but does inform staff of [facility] that "I am fat and ugly and should kill myself "  Pt is at this time able to verbally CFS  She reports no BM x 4 days, was encouraged to continue compliance with meds and to be ambulating halls more often to help circulation
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
## 2022-01-04 PROCEDURE — 99232 SBSQ HOSP IP/OBS MODERATE 35: CPT | Performed by: PHYSICIAN ASSISTANT

## 2022-01-04 RX ORDER — FLUOXETINE HYDROCHLORIDE 20 MG/1
60 CAPSULE ORAL EVERY EVENING
Status: DISCONTINUED | OUTPATIENT
Start: 2022-01-04 | End: 2022-01-05

## 2022-01-04 RX ORDER — FLUOXETINE HYDROCHLORIDE 20 MG/1
60 CAPSULE ORAL
Status: DISCONTINUED | OUTPATIENT
Start: 2022-01-04 | End: 2022-01-04

## 2022-01-04 RX ADMIN — FLUOXETINE 60 MG: 20 CAPSULE ORAL at 17:09

## 2022-01-04 RX ADMIN — ATORVASTATIN CALCIUM 40 MG: 40 TABLET, FILM COATED ORAL at 17:09

## 2022-01-04 RX ADMIN — OLANZAPINE 5 MG: 5 TABLET, FILM COATED ORAL at 20:16

## 2022-01-04 RX ADMIN — BUPROPION HYDROCHLORIDE 300 MG: 300 TABLET, FILM COATED, EXTENDED RELEASE ORAL at 09:03

## 2022-01-04 RX ADMIN — FLUPHENAZINE HYDROCHLORIDE 15 MG: 5 TABLET, FILM COATED ORAL at 21:41

## 2022-01-04 RX ADMIN — ACETAMINOPHEN 650 MG: 325 TABLET, FILM COATED ORAL at 17:09

## 2022-01-04 RX ADMIN — Medication 3 MG: at 21:41

## 2022-01-04 RX ADMIN — PRAZOSIN HYDROCHLORIDE 2 MG: 1 CAPSULE ORAL at 21:41

## 2022-01-04 RX ADMIN — FLUPHENAZINE HYDROCHLORIDE 5 MG: 5 TABLET, FILM COATED ORAL at 09:03

## 2022-01-04 RX ADMIN — ASPIRIN 81 MG CHEWABLE TABLET 81 MG: 81 TABLET CHEWABLE at 09:03

## 2022-01-04 RX ADMIN — FLUPHENAZINE HYDROCHLORIDE 5 MG: 5 TABLET, FILM COATED ORAL at 12:56

## 2022-01-04 RX ADMIN — HYDROXYZINE HYDROCHLORIDE 100 MG: 50 TABLET, FILM COATED ORAL at 20:16

## 2022-01-04 RX ADMIN — Medication 1000 UNITS: at 21:41

## 2022-01-04 RX ADMIN — LISINOPRIL 20 MG: 20 TABLET ORAL at 09:05

## 2022-01-04 RX ADMIN — DIVALPROEX SODIUM 2000 MG: 500 TABLET, EXTENDED RELEASE ORAL at 09:03

## 2022-01-04 NOTE — PLAN OF CARE
Patient participated in 2/2 groups for the day    Problem: Ineffective Coping  Goal: Participates in unit activities  Description: Interventions:  - Provide therapeutic environment   - Provide required programming   - Redirect inappropriate behaviors   Outcome: Progressing

## 2022-01-04 NOTE — NURSING NOTE
Pt sitting in tv room following breakfast   Reports feeling depressed and having AH  States not having a good day  When asked if feeling safe, pt states "Not really"  Pt then able to consent for safety and agreeable to notify staff if this changes

## 2022-01-04 NOTE — PLAN OF CARE
Problem: SELF HARM/SUICIDALITY  Goal: Will have no self-injury during hospital stay  Description: INTERVENTIONS:  - Q 15 MINUTES: Routine safety checks  - Q WAKING SHIFT & PRN: Assess risk to determine if routine checks are adequate to maintain patient safety  - Encourage patient to participate actively in care by formulating a plan to combat response to suicidal ideation, identify supports and resources  Outcome: Progressing     Problem: DEPRESSION  Goal: Will be euthymic at discharge  Description: INTERVENTIONS:  - Administer medication as ordered  - Provide emotional support via 1:1 interaction with staff  - Encourage involvement in milieu/groups/activities  - Monitor for social isolation  Outcome: Progressing     Problem: BEHAVIOR  Goal: Pt/Family maintain appropriate behavior and adhere to behavioral management agreement, if implemented  Description: INTERVENTIONS:  - Assess the family dynamic   - Encourage verbalization of thoughts and concerns in a socially appropriate manner  - Assess patient/family's coping skills and non-compliant behavior (including use of illegal substances)  - Utilize positive, consistent limit setting strategies supporting safety of patient, staff and others  - Initiate consult with Case Management, Spiritual Care or other ancillary services as appropriate  - If a patient's/visitor's behavior jeopardizes the safety of the patient, staff, or others, refer to organization procedure     - Notify Security of behavior or suspected illegal substances which indicate the need for search of the patient and/or belongings  - Encourage participation in the decision making process about a behavioral management agreement; implement if patient meets criteria  Outcome: Progressing

## 2022-01-04 NOTE — TREATMENT TEAM
01/04/22 1300   Activity/Group Checklist   Group Life Skills  (Art Therapy- [employer])   Attendance Attended   Attendance Duration (min) 46-60   Interactions Interacted appropriately   Affect/Mood Appropriate;Calm   Goals Achieved Identified feelings; Able to listen to others; Able to engage in interactions; Other (Comment)  (spontaneous self expression, connection, insight)     Patient fully participated in the art therapy group  She had positive interactions with peers and staff throughout

## 2022-01-05 PROBLEM — Z00.8 MEDICAL CLEARANCE FOR PSYCHIATRIC ADMISSION: Status: RESOLVED | Noted: 2021-01-09 | Resolved: 2022-01-05

## 2022-01-05 PROCEDURE — 99232 SBSQ HOSP IP/OBS MODERATE 35: CPT | Performed by: PHYSICIAN ASSISTANT

## 2022-01-05 PROCEDURE — 0241U HB NFCT DS VIR RESP RNA 4 TRGT: CPT

## 2022-01-05 RX ORDER — FLUOXETINE HYDROCHLORIDE 20 MG/1
80 CAPSULE ORAL EVERY EVENING
Status: DISCONTINUED | OUTPATIENT
Start: 2022-01-05 | End: 2022-01-06 | Stop reason: HOSPADM

## 2022-01-05 RX ORDER — ASPIRIN 81 MG/1
81 TABLET ORAL DAILY
Qty: 30 TABLET | Refills: 0 | Status: SHIPPED | OUTPATIENT
Start: 2022-01-05 | End: 2022-02-16 | Stop reason: HOSPADM

## 2022-01-05 RX ORDER — FLUPHENAZINE HYDROCHLORIDE 5 MG/1
5 TABLET ORAL
Qty: 60 TABLET | Refills: 0 | Status: ON HOLD | OUTPATIENT
Start: 2022-01-06 | End: 2022-02-15 | Stop reason: ALTCHOICE

## 2022-01-05 RX ORDER — CALCIUM CARBONATE 200(500)MG
500 TABLET,CHEWABLE ORAL DAILY
Qty: 30 TABLET | Refills: 0 | Status: SHIPPED | OUTPATIENT
Start: 2022-01-05 | End: 2022-02-04

## 2022-01-05 RX ORDER — BUPROPION HYDROCHLORIDE 300 MG/1
300 TABLET ORAL DAILY
Qty: 30 TABLET | Refills: 0 | Status: ON HOLD | OUTPATIENT
Start: 2022-01-05 | End: 2022-02-15 | Stop reason: SDUPTHER

## 2022-01-05 RX ORDER — ATORVASTATIN CALCIUM 40 MG/1
40 TABLET, FILM COATED ORAL
Qty: 30 TABLET | Refills: 0 | Status: ON HOLD | OUTPATIENT
Start: 2022-01-05 | End: 2022-02-15

## 2022-01-05 RX ORDER — LANOLIN ALCOHOL/MO/W.PET/CERES
3 CREAM (GRAM) TOPICAL
Qty: 30 TABLET | Refills: 0 | Status: ON HOLD | OUTPATIENT
Start: 2022-01-05 | End: 2022-02-15 | Stop reason: SDUPTHER

## 2022-01-05 RX ORDER — MELATONIN
1000 DAILY
Qty: 30 TABLET | Refills: 0 | Status: ON HOLD | OUTPATIENT
Start: 2022-01-05 | End: 2022-02-15

## 2022-01-05 RX ORDER — DIVALPROEX SODIUM 500 MG/1
2000 TABLET, EXTENDED RELEASE ORAL DAILY
Qty: 120 TABLET | Refills: 0 | Status: SHIPPED | OUTPATIENT
Start: 2022-01-06 | End: 2022-02-16 | Stop reason: HOSPADM

## 2022-01-05 RX ORDER — POLYETHYLENE GLYCOL 3350 17 G/17G
17 POWDER, FOR SOLUTION ORAL DAILY
Qty: 510 G | Refills: 0 | Status: SHIPPED | OUTPATIENT
Start: 2022-01-05 | End: 2022-02-16 | Stop reason: HOSPADM

## 2022-01-05 RX ORDER — PRAZOSIN HYDROCHLORIDE 2 MG/1
2 CAPSULE ORAL
Qty: 30 CAPSULE | Refills: 0 | Status: SHIPPED | OUTPATIENT
Start: 2022-01-05 | End: 2022-02-16 | Stop reason: HOSPADM

## 2022-01-05 RX ORDER — NYSTATIN 100000 U/G
CREAM TOPICAL 2 TIMES DAILY
Qty: 30 G | Refills: 0 | Status: SHIPPED | OUTPATIENT
Start: 2022-01-05 | End: 2022-02-16 | Stop reason: HOSPADM

## 2022-01-05 RX ORDER — LISINOPRIL 20 MG/1
20 TABLET ORAL DAILY
Qty: 30 TABLET | Refills: 0 | Status: ON HOLD | OUTPATIENT
Start: 2022-01-05 | End: 2022-02-15

## 2022-01-05 RX ORDER — FLUPHENAZINE HYDROCHLORIDE 5 MG/1
15 TABLET ORAL
Qty: 90 TABLET | Refills: 0 | Status: ON HOLD | OUTPATIENT
Start: 2022-01-05 | End: 2022-02-15 | Stop reason: ALTCHOICE

## 2022-01-05 RX ORDER — FLUOXETINE HYDROCHLORIDE 40 MG/1
80 CAPSULE ORAL EVERY EVENING
Qty: 60 CAPSULE | Refills: 0 | Status: SHIPPED | OUTPATIENT
Start: 2022-01-05 | End: 2022-02-16 | Stop reason: HOSPADM

## 2022-01-05 NOTE — NURSING NOTE
Pt reporting sore throat and cough  Pt received Tylenol 650 mg po  Pt was in the day room while peers were playing Wii  Pt not wearing mask correctly  Pt asked multiple times to pull mask above nose  Pt refused  Pt was redirected to go back to room if not going to comply with policy  Pt got up and stomped to room saying "Fuck Y'all I am not wearing a mask"  Pt educated in order to go to groups and be out in the milieu, pt needs to wear mask  Pt staying in room and not talking to this writer at this time

## 2022-01-05 NOTE — NURSING NOTE
Pt became op positionally defiant  Regarding wearing of mask, and threatened to hurt self ,not cooperating in layo  for safety at this time ,placed in front of nurses station in chair, for pt safety , cooperative with meds

## 2022-01-05 NOTE — PLAN OF CARE
Patient participated in 0/2 groups for the day    Problem: Ineffective Coping  Goal: Participates in unit activities  Description: Interventions:  - Provide therapeutic environment   - Provide required programming   - Redirect inappropriate behaviors   Outcome: Not Progressing

## 2022-01-05 NOTE — CASE MANAGEMENT
CM received a phone call from Steve Fraser at [facility], looking for an update  CM reviewed that d/c is tentative for tomorrow & that a COVID test will be completed today & Pt's prescriptions faxed to the pharmacy & residence  Steve Fraser said that he would talk with the supervisor to get a pick-up time for Pt for tomorrow  CM met with Pt who reported she had "okay" sleep  Pt had no questions/concerns for CM

## 2022-01-05 NOTE — CASE MANAGEMENT
ROSE received a call from Sanam, Pt's care coordinator at [facility], who reported she had been out sick  ROSE provided an update on Pt & plans for d/c tomorrow    Sanam confirmed they will provide transportation around 11 AM

## 2022-01-05 NOTE — PROGRESS NOTES
Status: Pt defiant, threatening self harm, if she had to wear a mask  Pt stating she couldn't contract for safety & was placed by the nurses station for observation, however, quickly became bored, stating she no longer felt like self harming & returned to the day room with her peers  Medication: Prozac increased to 60mg in the evening / PRN - Tylenol, Atarax, & Zyprexa  D/C: Thurs / Pt will need COVID test, & prescriptions sent today to residence/pharmacy  01/05/22 0750   Team Meeting   Meeting Type Daily Rounds   Team Members Present   Team Members Present Physician;Nurse;; Other (Discipline and Name)   Physician Team Member Dr Maribel De La O / Yarely Hanna / Christal Youngblood / Feng Sorenson Team Member Graciela Woods / Guille Selby Management Team Member Margarito Britt / Anna Bear   Other (Discipline and Name) Luke Garcia (art therapy)   Patient/Family Present   Patient Present No   Patient's Family Present No

## 2022-01-05 NOTE — PROGRESS NOTES
Progress Note - Behavioral Health     Leydi Khan 49 y o  female MRN: [id number]   Unit/Bed#: -01 Encounter: [id number]    Behavior over the last 24 hours: marcus Holley is a 43-year-old female with a history of bipolar disorder and borderline personality disorder who presents for psychiatric follow-up  Staff reports that she threatened to hurt herself yesterday because she was told to wear a mask  She did not hurt herself and she did end up wearing the mask without any issues  Today, she is interviewed at bedside and reports that she is upset that she is going to be returning to her group home  She had her COVID swab ordered today  She reports feeling frustrated with her group home  She endorses poor appetite and decreased motivation but otherwise denies acute concerns  She is tired and falls asleep towards the end of our conversation  Prior to this, though, she said she did not plan on leaving bed today      Sleep: hypersomnia  Appetite: normal  Medication side effects: No   ROS: all other systems are negative    Mental Status Evaluation:    Appearance:  age appropriate, casually dressed, adequate grooming   Behavior:  cooperative, calm   Speech:  slow   Mood:  "frustrated about going back to my group home"   Affect:  constricted   Thought Process:  organized, linear   Associations: concrete associations   Thought Content:  no overt delusions   Perceptual Disturbances: no auditory hallucinations, no visual hallucinations, does not appear responding to internal stimuli   Risk Potential: Suicidal ideation - None at present  Homicidal ideation - None at present  Potential for aggression - No   Sensorium:  oriented to person, place, time/date and situation   Memory:  recent and remote memory grossly intact   Consciousness:  alert and awake   Attention/Concentration: attention span and concentration appear shorter than expected for age   Insight:  fair   Judgment: fair   Gait/Station: slow gait Motor Activity: no abnormal movements     Vital signs in last 24 hours:    Temp:  [98 4 °F (36 9 °C)-98 7 °F (37 1 °C)] 98 7 °F (37 1 °C)  HR:  [73-88] 73  Resp:  [16-17] 16  BP: (104-120)/(52-64) 104/52    Laboratory results: I have personally reviewed all pertinent laboratory/tests results    Most Recent Labs:   Lab Results   Component Value Date    WBC 5 54 12/31/2021    RBC 3 61 (L) 12/31/2021    HGB 11 5 12/31/2021    HCT 34 5 (L) 12/31/2021     12/31/2021    RDW 14 7 12/31/2021    NEUTROABS 5 47 12/17/2021    SODIUM 135 (L) 12/31/2021    K 3 8 12/31/2021    CL 98 (L) 12/31/2021    CO2 28 12/31/2021    BUN 13 12/31/2021    CREATININE 0 69 12/31/2021    GLUC 80 12/31/2021    CALCIUM 8 4 12/31/2021    AST 6 12/31/2021    ALT 14 12/31/2021    ALKPHOS 52 12/31/2021    TP 6 2 (L) 12/31/2021    ALB 3 0 (L) 12/31/2021    TBILI 0 40 12/31/2021    CHOLESTEROL 134 12/31/2021    HDL 38 (L) 12/31/2021    TRIG 155 (H) 12/31/2021    LDLCALC 65 12/31/2021    [place] 96 12/31/2021    VALPROICTOT 97 12/19/2021    RAC0OSWNNTSO 1 849 12/17/2021    PREGUR negative 12/17/2021    PREGSERUM Negative 12/04/2021    RPR Non-Reactive 12/31/2021    HGBA1C 4 8 12/31/2021    EAG 91 12/31/2021       Progress Toward Goals: progressing, working on coping skills, discharge planning    Assessment/Plan   Principal Problem:    Bipolar affective disorder, depressed, severe, with psychotic behavior ([address] )  Active Problems:    Medical clearance for psychiatric admission    Essential hypertension    Hyperlipidemia    Class 3 severe obesity due to excess calories without serious comorbidity in adult [facility])      Recommended Treatment:     Planned medication and treatment changes: All current active medications have been reviewed  Encourage group therapy, milieu therapy and occupational therapy  Behavioral Health checks every 7 minutes    Increase Prozac back to prior to admission dosing of 80 mg daily      VPA level due tomorrow morning, continues on Depakote ER 2000 mg daily  Continue remainder of medications  Plan to discharge back to group home tomorrow  Patient is on happy about returning to her group home but she is not actively suicidal, homicidal or psychotic      Current Facility-Administered Medications   Medication Dose Route Frequency Provider Last Rate    acetaminophen  650 mg Oral Q6H PRN Haley Conklin PA-C      acetaminophen  650 mg Oral Q4H PRN Haley Conklin PA-C      acetaminophen  975 mg Oral Q6H PRN Haley Conklin PA-C      aluminum-magnesium hydroxide-simethicone  30 mL Oral Q4H PRN Haley Conklin PA-C      artificial tear  1 application Both Eyes J4F PRN Haley Conklin PA-C      aspirin  81 mg Oral Daily Haley JO Conklin      atorvastatin  40 mg Oral Daily With Custar CageJO martínez      benztropine  1 mg Intramuscular Q4H PRN Max 6/day Haley JO Conklin      benztropine  1 mg Oral Q4H PRN Max 6/day Haley Conklin PA-C      bisacodyl  10 mg Rectal Daily PRN Haley Conklin PA-C      buPROPion  300 mg Oral Daily Haley Conklin PA-C      calcium carbonate  500 mg Oral QAM Haley Conklin PA-C      cholecalciferol  1,000 Units Oral HS Haley Conklin PA-C      Diclofenac Sodium  2 g Topical 4x Daily Maggie Reyna PA-C      hydrOXYzine HCL  50 mg Oral Q6H PRN Max 4/day Haley Conklin PA-C      Or    diphenhydrAMINE  50 mg Intramuscular Q6H PRN Haley Conklin PA-C      divalproex sodium  2,000 mg Oral Daily Haleybrenna Conklin PA-C      FLUoxetine  60 mg Oral QPM Haley Conklin PA-C      fluPHENAZine  15 mg Oral HS Adal C Holter, DO      fluPHENAZine  5 mg Oral BID before breakfast/lunch Haley Conklin PA-C      hydrOXYzine HCL  100 mg Oral Q6H PRN Max 4/day Haley Conklin PA-C      Or    LORazepam  2 mg Intramuscular Q6H PRN Haley Conklin PA-C      hydrOXYzine HCL  25 mg Oral Q6H PRN Max 4/day Haleybrenna Conklin PA-C      lisinopril  20 mg Oral Daily Zaina Zurita Shawna Fletcher PA-C      melatonin  3 mg Oral HS Larue Dandy, PA-C      nystatin   Topical BID Endy Bejarano MD      OLANZapine  10 mg Oral Q3H PRN Max 3/day Larue Dandy, PA-C      Or    OLANZapine  10 mg Intramuscular Q3H PRN Max 3/day Larue Dandy, PA-C      OLANZapine  5 mg Oral Q3H PRN Max 6/day Larue Dandy, PA-C      Or    OLANZapine  5 mg Intramuscular Q3H PRN Max 6/day Larue Dandy, PA-C      OLANZapine  2 5 mg Oral Q3H PRN Max 8/day Larue Dandy, PA-C      polyethylene glycol  17 g Oral Daily PRN Larue Dandy, PA-C      prazosin  2 mg Oral HS Larue Dandy, PA-C      senna-docusate sodium  1 tablet Oral Daily PRN Larue Dandy, PA-C      traZODone  50 mg Oral HS PRN Larue Dandy, PA-C       Risks / Benefits of Treatment:    Risks, benefits, and possible side effects of medications explained to patient and patient verbalizes understanding and agreement for treatment  Counseling / Coordination of Care:    Patient's progress discussed with staff in treatment team meeting  Medications, treatment progress and treatment plan reviewed with patient      Larue Dandy, PA-C 01/05/22

## 2022-01-05 NOTE — PROGRESS NOTES
Status: Pt seclusive to her room much of the day, argumentive about having to wear a mask for groups  Pt appears to have slept overnight without issue  Medication: Prozac was increased to 40mg & Depakene switched to Depakote ER  D/C: Thmargarita(?)      01/04/22 0830   Team Meeting   Meeting Type Daily Rounds   Team Members Present   Team Members Present Physician;Nurse;; Other (Discipline and Name)   Physician Team Member Dr Josh Steen / Sima Bettencourt / Marlene Benton / Phan Irwin Team Member [place] Management Team Member Tono Bah / Serafin Mota   Other (Discipline and Name) Jessica Lozano (art therapy)   Patient/Family Present   Patient Present No   Patient's Family Present No

## 2022-01-05 NOTE — NURSING NOTE
Pt seclusive to room at start of shift  States feeling depression with AH "they tell me to hurt myself and are mean " pt however denies [place] for safety  States she worries about discharge and if SI would return  Reviewed with patient positive coping skills and supports she has to utilize after discharge  Pt states she didn't eat breakfast or lunch due to feeling down  Agreeable to eat dinner  Pt social with select peers  Pt has been compliant with wearing a mask in the community setting

## 2022-01-05 NOTE — DISCHARGE INSTR - OTHER ORDERS
[facility] is a confidential 7 days/week telephone support service manned by trained mental health consumers  [facility] operates daily but is not able to accept calls between 2AM-6AM    [facility] provides support, a listening ear and can provide information about available services  [facility] specializes in the concerns of mental health consumers, their families and friends  However, we are also here for anyone who has a mental health concern, is confused about or just doesn't know anything about mental health or where to get information  To reach Yuan Espinal, call [phone] accepts calls between 6:00 AM to 10:00 AM and from 4:00 PM to 12:00 AM    Text CONNECT to 486196 from anywhere in the [place], anytime, about any type of crisis  A live, trained Crisis Counselor receives the text and lets you know that they are here to listen  The volunteer Crisis Counselor will help you move from a hot moment to a cool moment  [facility] ([facility]) AT [facility] - licensed telephone and mobile crisis services that provide mental health assessments to all age groups regardless of income or insurance  [facility] operates 24-hour/7 days a week  [address] [place] [address] assists consumer who are experiencing a mental health emergency and lack the resources to assist themselves  Immediate intervention for suicidal and depressed individuals with home visits/outreach being top priority  Crisis can be contacted at [phone]  The [facility]) offers various education & support groups for you & your family  For more information visit their website at   http://www [employer]/   Dial 2-1-1 to get connected/get help  Free, confidential information & referral available 24/7: Aging Services, Child & Youth Services, Counseling, Education/Training, Food/Shelter/Clothing, Health Services, Parenting, Substance Abuse, Support Groups, Volunteer Opportunities, & much more    Phone: 2-1-1 or [phone], Web: CGB TA898FEBP [facility], Email: [email or web address]    [facility]  [facility] ([place] [address], [facility], [address] [place] [address]) offers persons with a mental illness a safe, healing environment to explore their personal and vocational potential and receive support in achieving their goals  Instead of traditional therapy, the work of the house is the rehabilitation  As members contribute meaningful work to the house, they build confidence and a sense of purpose  Be a Member - It's Free  Membership in the [facility] is open to any [facility] resident who is 25 or older and has a history of mental illness  Membership is free for life  [place] [address], [facility], [address] [place] [address]  Hours: Monday - Friday: 8 a m  - 4 p m  With varying hours on holidays   ([employer] [address] [place] [address] ([address]) provides a stress-free atmosphere for persons 18 and older who have experienced mental health issues  What The [address] [employer]  Holiday gatherings  Recovery  Employment  Education  Community Resources  Empowerment  Helps participants achieve their goals  Enhances quality of life  Encourages leadership    The [address]  Hours: Monday through Friday: 4 p m  - 9 p m  Saturday: 2 p m  - 8 p m    Closed Sundays  Varying hours on holidays            Contact [phone]
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
## 2022-01-05 NOTE — BH TRANSITION RECORD
Contact Information: If you have any questions, concerns, pended studies, tests and/or procedures, or emergencies regarding your inpatient behavioral health visit  Please contact [facility] behavioral health [facility] [phone] and ask to speak to a , nurse or physician  A contact is available 24 hours/ 7 days a week at this number  Summary of Procedures Performed During your Stay:  Below is a list of major procedures performed during your hospital stay and a summary of results:  - No major procedures performed  Pending Studies (From admission, onward)     Start     Ordered    01/06/22 0600  Valproic acid level, total  Morning draw         01/03/22 1057              If studies are pending at discharge, follow up with your PCP and/or referring provider

## 2022-01-05 NOTE — NURSING NOTE
Pt denies SI/SAB-verbally contracts for safety  Reports having AH however would not elaborate  Pt seclusive to room, states "I dont feel good " However pt will not explain what this means  Asked pt if she meant emotional or physically not feeling well  Pt would not answer this  Pt refused morning medications  Reviewed with patient of importance of remaining compliant on medications and to notify staff if having any concerns with meds however pt continues to refuse at this time  Pt states wanting to rest in bed and then pulled blanket over head to end conversation with this writer  Explained she did not have to continue talking if she was tired however the blanket needs to remain below shoulders for safety reasons  Pt cooperative with this

## 2022-01-05 NOTE — DISCHARGE INSTR - APPOINTMENTS
Cleopatra Reinoso RN, [address], will be calling you after your discharge, on the phone number that you provided  She will be available as an additional support, if needed  If you wish to speak with her, you may contact Gideon Essex at [phone]
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
## 2022-01-05 NOTE — NURSING NOTE
Pt wrote note and handed it to staff, stating "Leydi will hurt herself tonight  No contract for safety"  Pt's plan is to smash mirror and cut self with pieces  Pt unable to complete plan due to not having a mirror  Pt was moved out of the room and is sitting in front of nurses station  Pt refused to wear a mask  Pt needed multiple redirection until patient wore a mask  Pt crying hysterically at nurses station

## 2022-01-06 VITALS
RESPIRATION RATE: 18 BRPM | HEIGHT: 64 IN | HEART RATE: 75 BPM | WEIGHT: 262 LBS | DIASTOLIC BLOOD PRESSURE: 61 MMHG | SYSTOLIC BLOOD PRESSURE: 128 MMHG | TEMPERATURE: 98.1 F | BODY MASS INDEX: 44.73 KG/M2 | OXYGEN SATURATION: 96 %

## 2022-01-06 LAB — VALPROATE SERPL-MCNC: 19 UG/ML (ref 50–100)

## 2022-01-06 PROCEDURE — 99239 HOSP IP/OBS DSCHRG MGMT >30: CPT | Performed by: PHYSICIAN ASSISTANT

## 2022-01-06 PROCEDURE — 80164 ASSAY DIPROPYLACETIC ACD TOT: CPT | Performed by: PHYSICIAN ASSISTANT

## 2022-01-06 RX ADMIN — FLUPHENAZINE HYDROCHLORIDE 5 MG: 5 TABLET, FILM COATED ORAL at 08:52

## 2022-01-06 RX ADMIN — ASPIRIN 81 MG CHEWABLE TABLET 81 MG: 81 TABLET CHEWABLE at 08:52

## 2022-01-06 RX ADMIN — BUPROPION HYDROCHLORIDE 300 MG: 300 TABLET, FILM COATED, EXTENDED RELEASE ORAL at 08:52

## 2022-01-06 RX ADMIN — DIVALPROEX SODIUM 2000 MG: 500 TABLET, EXTENDED RELEASE ORAL at 08:52

## 2022-01-06 RX ADMIN — ACETAMINOPHEN 975 MG: 325 TABLET, FILM COATED ORAL at 08:54

## 2022-01-06 RX ADMIN — LISINOPRIL 20 MG: 20 TABLET ORAL at 08:52

## 2022-01-06 NOTE — PROGRESS NOTES
Status: Pt refused meals/medications after getting her COVID test done; Pt knows that she will be discharged once COVID testing is completed  Pt seclusive to her room throughout the day  Pt upset about masking being reinforced for groups/milieu activities  Pt did eat 100% dinner & snack & slept overnight  Medication: Pt refused her medications / no PRNs  D/C: Today / Shauna Ayon is to provide transportation around 11 AM   Pt's prescriptions & negative COVID test were sent to [place] [id number] yesterday  01/06/22 0815   Team Meeting   Meeting Type Daily Rounds   Team Members Present   Team Members Present Physician;Nurse;; Other (Discipline and Name)   Physician Team Member Dr Jessika Wallace / Cortez Carcamo / Mora Segura / Kennedy Cooper Team Member [facility] Team Member Nakul Hart / Glenda Boas   Other (Discipline and Name) Raj Gasca (art therapy)   Patient/Family Present   Patient Present No   Patient's Family Present No

## 2022-01-06 NOTE — CASE MANAGEMENT
CM met with Pt to review & sign IMM  Pt took her morning medications & said that she slept okay overnight  CM reviewed that she spoke with Miguelina Baker & they would be working to create a new behavioral plan with Pt  CM asked if there were things that Pt enjoyed that could be incorporated into her plan? Pt said that she likes to get [place] food & go out shopping & get her nails done  Pt said that she would like to apply for food stamps  Pt said that she had food stamps in the past & there is a man at her residence that has food stamps & he gets to occasionally go out & buy food/snacks that he likes with his food stamps  CM agreed to passes these ideas along to [facility], Pt's care coordinator  Pt had no questions for CM about her d/c plans  CM contacted Sanam @ [phone] ext  [phone] & left a message regarding above

## 2022-01-06 NOTE — NURSING NOTE
Pt refusing medication  This writer tried educating patient on medication  Pt disinterested and yelled "I'm not taking them, leave"

## 2022-01-06 NOTE — NURSING NOTE
Pt reports she slept well overnight  Denies SI/HI, SAB-verbally contracts for safety  Reports continued AH which are negative  Pt states wanting to watch some television to help distract self from them  Encouraged pt to shower and attend to ADL's however pt declined  Pt states "not sure" whether she wants to take her morning medications stating however states reason is "dont feel like it " Encouraged pt the importance of medication compliance

## 2022-01-06 NOTE — NURSING NOTE
AVS discharge instructions reviewed with patient  Patient denies any concerns or questions  Patient was discharged from the unit pleasant and calm

## 2022-02-10 ENCOUNTER — HOSPITAL ENCOUNTER (INPATIENT)
Facility: HOSPITAL | Age: 52
LOS: 6 days | Discharge: HOME | DRG: 885 | End: 2022-02-16
Attending: STUDENT IN AN ORGANIZED HEALTH CARE EDUCATION/TRAINING PROGRAM | Admitting: PSYCHIATRY & NEUROLOGY
Payer: MEDICARE

## 2022-02-10 ENCOUNTER — HOSPITAL ENCOUNTER (EMERGENCY)
Facility: HOSPITAL | Age: 52
End: 2022-02-10
Attending: EMERGENCY MEDICINE | Admitting: EMERGENCY MEDICINE
Payer: MEDICARE

## 2022-02-10 VITALS
BODY MASS INDEX: 45.24 KG/M2 | TEMPERATURE: 97.4 F | HEART RATE: 75 BPM | HEIGHT: 64 IN | OXYGEN SATURATION: 97 % | RESPIRATION RATE: 20 BRPM | SYSTOLIC BLOOD PRESSURE: 130 MMHG | DIASTOLIC BLOOD PRESSURE: 84 MMHG | WEIGHT: 265 LBS

## 2022-02-10 DIAGNOSIS — E78.5 HYPERLIPIDEMIA, UNSPECIFIED HYPERLIPIDEMIA TYPE: ICD-10-CM

## 2022-02-10 DIAGNOSIS — F60.3 BORDERLINE PERSONALITY DISORDER (HCC): ICD-10-CM

## 2022-02-10 DIAGNOSIS — F31.5 BIPOLAR AFFECTIVE DISORDER, DEPRESSED, SEVERE, WITH PSYCHOTIC BEHAVIOR (HCC): Primary | ICD-10-CM

## 2022-02-10 DIAGNOSIS — I10 ESSENTIAL HYPERTENSION: ICD-10-CM

## 2022-02-10 DIAGNOSIS — E78.5 HYPERLIPIDEMIA: ICD-10-CM

## 2022-02-10 DIAGNOSIS — R45.851 SUICIDAL IDEATION: Primary | ICD-10-CM

## 2022-02-10 DIAGNOSIS — F31.4 BIPOLAR AFFECTIVE DISORDER, DEPRESSED, SEVERE (HCC): ICD-10-CM

## 2022-02-10 DIAGNOSIS — E55.9 VITAMIN D DEFICIENCY: ICD-10-CM

## 2022-02-10 LAB
ALBUMIN SERPL BCP-MCNC: 3.6 G/DL (ref 3.5–5)
ALP SERPL-CCNC: 65 U/L (ref 46–116)
ALT SERPL W P-5'-P-CCNC: 25 U/L (ref 12–78)
AMPHETAMINES SERPL QL SCN: NEGATIVE
ANION GAP SERPL CALCULATED.3IONS-SCNC: 4 MMOL/L (ref 4–13)
AST SERPL W P-5'-P-CCNC: 15 U/L (ref 5–45)
BARBITURATES UR QL: NEGATIVE
BASOPHILS # BLD AUTO: 0.03 THOUSANDS/ΜL (ref 0–0.1)
BASOPHILS NFR BLD AUTO: 0 % (ref 0–1)
BENZODIAZ UR QL: NEGATIVE
BILIRUB SERPL-MCNC: 0.6 MG/DL (ref 0.2–1)
BILIRUB UR QL STRIP: NEGATIVE
BUN SERPL-MCNC: 16 MG/DL (ref 5–25)
CALCIUM SERPL-MCNC: 9 MG/DL (ref 8.3–10.1)
CHLORIDE SERPL-SCNC: 101 MMOL/L (ref 100–108)
CLARITY UR: CLEAR
CO2 SERPL-SCNC: 31 MMOL/L (ref 21–32)
COCAINE UR QL: NEGATIVE
COLOR UR: YELLOW
CREAT SERPL-MCNC: 0.72 MG/DL (ref 0.6–1.3)
EOSINOPHIL # BLD AUTO: 0.08 THOUSAND/ΜL (ref 0–0.61)
EOSINOPHIL NFR BLD AUTO: 1 % (ref 0–6)
ERYTHROCYTE [DISTWIDTH] IN BLOOD BY AUTOMATED COUNT: 14.2 % (ref 11.6–15.1)
ETHANOL EXG-MCNC: 0 MG/DL
EXT PREG TEST URINE: NEGATIVE
EXT. CONTROL ED NAV: NORMAL
FLUAV RNA RESP QL NAA+PROBE: NEGATIVE
FLUBV RNA RESP QL NAA+PROBE: NEGATIVE
GFR SERPL CREATININE-BSD FRML MDRD: 97 ML/MIN/1.73SQ M
GLUCOSE SERPL-MCNC: 89 MG/DL (ref 65–140)
GLUCOSE UR STRIP-MCNC: NEGATIVE MG/DL
HCT VFR BLD AUTO: 39.5 % (ref 34.8–46.1)
HGB BLD-MCNC: 12.6 G/DL (ref 11.5–15.4)
HGB UR QL STRIP.AUTO: NEGATIVE
IMM GRANULOCYTES # BLD AUTO: 0.07 THOUSAND/UL (ref 0–0.2)
IMM GRANULOCYTES NFR BLD AUTO: 1 % (ref 0–2)
KETONES UR STRIP-MCNC: NEGATIVE MG/DL
LEUKOCYTE ESTERASE UR QL STRIP: NEGATIVE
LYMPHOCYTES # BLD AUTO: 1.72 THOUSANDS/ΜL (ref 0.6–4.47)
LYMPHOCYTES NFR BLD AUTO: 22 % (ref 14–44)
MCH RBC QN AUTO: 31.3 PG (ref 26.8–34.3)
MCHC RBC AUTO-ENTMCNC: 31.9 G/DL (ref 31.4–37.4)
MCV RBC AUTO: 98 FL (ref 82–98)
METHADONE UR QL: NEGATIVE
MONOCYTES # BLD AUTO: 1.28 THOUSAND/ΜL (ref 0.17–1.22)
MONOCYTES NFR BLD AUTO: 16 % (ref 4–12)
NEUTROPHILS # BLD AUTO: 4.64 THOUSANDS/ΜL (ref 1.85–7.62)
NEUTS SEG NFR BLD AUTO: 60 % (ref 43–75)
NITRITE UR QL STRIP: NEGATIVE
NRBC BLD AUTO-RTO: 0 /100 WBCS
OPIATES UR QL SCN: NEGATIVE
OXYCODONE+OXYMORPHONE UR QL SCN: NEGATIVE
PCP UR QL: NEGATIVE
PH UR STRIP.AUTO: 7.5 [PH]
PLATELET # BLD AUTO: 177 THOUSANDS/UL (ref 149–390)
PMV BLD AUTO: 9.5 FL (ref 8.9–12.7)
POTASSIUM SERPL-SCNC: 4.4 MMOL/L (ref 3.5–5.3)
PROT SERPL-MCNC: 7.3 G/DL (ref 6.4–8.2)
PROT UR STRIP-MCNC: NEGATIVE MG/DL
RBC # BLD AUTO: 4.03 MILLION/UL (ref 3.81–5.12)
RSV RNA RESP QL NAA+PROBE: NEGATIVE
SARS-COV-2 RNA RESP QL NAA+PROBE: NEGATIVE
SODIUM SERPL-SCNC: 136 MMOL/L (ref 136–145)
SP GR UR STRIP.AUTO: 1.01 (ref 1–1.03)
THC UR QL: NEGATIVE
UROBILINOGEN UR QL STRIP.AUTO: 4 E.U./DL
WBC # BLD AUTO: 7.82 THOUSAND/UL (ref 4.31–10.16)

## 2022-02-10 PROCEDURE — 81003 URINALYSIS AUTO W/O SCOPE: CPT | Performed by: EMERGENCY MEDICINE

## 2022-02-10 PROCEDURE — 81025 URINE PREGNANCY TEST: CPT

## 2022-02-10 PROCEDURE — 99285 EMERGENCY DEPT VISIT HI MDM: CPT

## 2022-02-10 PROCEDURE — 80053 COMPREHEN METABOLIC PANEL: CPT | Performed by: EMERGENCY MEDICINE

## 2022-02-10 PROCEDURE — 36415 COLL VENOUS BLD VENIPUNCTURE: CPT | Performed by: EMERGENCY MEDICINE

## 2022-02-10 PROCEDURE — 99285 EMERGENCY DEPT VISIT HI MDM: CPT | Performed by: EMERGENCY MEDICINE

## 2022-02-10 PROCEDURE — 80307 DRUG TEST PRSMV CHEM ANLYZR: CPT | Performed by: EMERGENCY MEDICINE

## 2022-02-10 PROCEDURE — 82075 ASSAY OF BREATH ETHANOL: CPT | Performed by: EMERGENCY MEDICINE

## 2022-02-10 PROCEDURE — 85025 COMPLETE CBC W/AUTO DIFF WBC: CPT | Performed by: EMERGENCY MEDICINE

## 2022-02-10 PROCEDURE — 0241U HB NFCT DS VIR RESP RNA 4 TRGT: CPT | Performed by: EMERGENCY MEDICINE

## 2022-02-10 PROCEDURE — 93005 ELECTROCARDIOGRAM TRACING: CPT

## 2022-02-10 RX ORDER — OLANZAPINE 5 MG/1
5 TABLET ORAL
Status: CANCELLED | OUTPATIENT
Start: 2022-02-10

## 2022-02-10 RX ORDER — MINERAL OIL AND PETROLATUM 150; 830 MG/G; MG/G
1 OINTMENT OPHTHALMIC
Status: CANCELLED | OUTPATIENT
Start: 2022-02-10

## 2022-02-10 RX ORDER — BISACODYL 10 MG
10 SUPPOSITORY, RECTAL RECTAL DAILY PRN
Status: CANCELLED | OUTPATIENT
Start: 2022-02-10

## 2022-02-10 RX ORDER — ACETAMINOPHEN 325 MG/1
650 TABLET ORAL EVERY 4 HOURS PRN
Status: CANCELLED | OUTPATIENT
Start: 2022-02-10

## 2022-02-10 RX ORDER — AMOXICILLIN 250 MG
1 CAPSULE ORAL DAILY PRN
Status: CANCELLED | OUTPATIENT
Start: 2022-02-10

## 2022-02-10 RX ORDER — OLANZAPINE 5 MG/1
5 TABLET ORAL
Status: DISCONTINUED | OUTPATIENT
Start: 2022-02-10 | End: 2022-02-16 | Stop reason: HOSPADM

## 2022-02-10 RX ORDER — AMOXICILLIN 250 MG
1 CAPSULE ORAL DAILY PRN
Status: DISCONTINUED | OUTPATIENT
Start: 2022-02-10 | End: 2022-02-16 | Stop reason: HOSPADM

## 2022-02-10 RX ORDER — ACETAMINOPHEN 325 MG/1
650 TABLET ORAL EVERY 6 HOURS PRN
Status: DISCONTINUED | OUTPATIENT
Start: 2022-02-10 | End: 2022-02-16 | Stop reason: HOSPADM

## 2022-02-10 RX ORDER — LANOLIN ALCOHOL/MO/W.PET/CERES
3 CREAM (GRAM) TOPICAL
Status: CANCELLED | OUTPATIENT
Start: 2022-02-10

## 2022-02-10 RX ORDER — OLANZAPINE 10 MG/1
5 INJECTION, POWDER, LYOPHILIZED, FOR SOLUTION INTRAMUSCULAR
Status: CANCELLED | OUTPATIENT
Start: 2022-02-10

## 2022-02-10 RX ORDER — MAGNESIUM HYDROXIDE/ALUMINUM HYDROXICE/SIMETHICONE 120; 1200; 1200 MG/30ML; MG/30ML; MG/30ML
30 SUSPENSION ORAL EVERY 4 HOURS PRN
Status: DISCONTINUED | OUTPATIENT
Start: 2022-02-10 | End: 2022-02-16 | Stop reason: HOSPADM

## 2022-02-10 RX ORDER — MINERAL OIL AND PETROLATUM 150; 830 MG/G; MG/G
1 OINTMENT OPHTHALMIC
Status: DISCONTINUED | OUTPATIENT
Start: 2022-02-10 | End: 2022-02-16 | Stop reason: HOSPADM

## 2022-02-10 RX ORDER — HYDROXYZINE HYDROCHLORIDE 25 MG/1
25 TABLET, FILM COATED ORAL
Status: DISCONTINUED | OUTPATIENT
Start: 2022-02-10 | End: 2022-02-16 | Stop reason: HOSPADM

## 2022-02-10 RX ORDER — TRAZODONE HYDROCHLORIDE 50 MG/1
50 TABLET ORAL
Status: CANCELLED | OUTPATIENT
Start: 2022-02-10

## 2022-02-10 RX ORDER — HYDROXYZINE HYDROCHLORIDE 25 MG/1
25 TABLET, FILM COATED ORAL
Status: CANCELLED | OUTPATIENT
Start: 2022-02-10

## 2022-02-10 RX ORDER — HYDROXYZINE HYDROCHLORIDE 25 MG/1
50 TABLET, FILM COATED ORAL
Status: CANCELLED | OUTPATIENT
Start: 2022-02-10

## 2022-02-10 RX ORDER — POLYETHYLENE GLYCOL 3350 17 G/17G
17 POWDER, FOR SOLUTION ORAL DAILY PRN
Status: DISCONTINUED | OUTPATIENT
Start: 2022-02-10 | End: 2022-02-16 | Stop reason: HOSPADM

## 2022-02-10 RX ORDER — HYDROXYZINE 50 MG/1
50 TABLET, FILM COATED ORAL
Status: DISCONTINUED | OUTPATIENT
Start: 2022-02-10 | End: 2022-02-16 | Stop reason: HOSPADM

## 2022-02-10 RX ORDER — LANOLIN ALCOHOL/MO/W.PET/CERES
3 CREAM (GRAM) TOPICAL
Status: DISCONTINUED | OUTPATIENT
Start: 2022-02-10 | End: 2022-02-16 | Stop reason: HOSPADM

## 2022-02-10 RX ORDER — BISACODYL 10 MG
10 SUPPOSITORY, RECTAL RECTAL DAILY PRN
Status: DISCONTINUED | OUTPATIENT
Start: 2022-02-10 | End: 2022-02-16 | Stop reason: HOSPADM

## 2022-02-10 RX ORDER — LORAZEPAM 2 MG/ML
2 INJECTION INTRAMUSCULAR EVERY 6 HOURS PRN
Status: DISCONTINUED | OUTPATIENT
Start: 2022-02-10 | End: 2022-02-16 | Stop reason: HOSPADM

## 2022-02-10 RX ORDER — OLANZAPINE 2.5 MG/1
2.5 TABLET ORAL
Status: CANCELLED | OUTPATIENT
Start: 2022-02-10

## 2022-02-10 RX ORDER — MAGNESIUM HYDROXIDE/ALUMINUM HYDROXICE/SIMETHICONE 120; 1200; 1200 MG/30ML; MG/30ML; MG/30ML
30 SUSPENSION ORAL EVERY 4 HOURS PRN
Status: CANCELLED | OUTPATIENT
Start: 2022-02-10

## 2022-02-10 RX ORDER — ACETAMINOPHEN 325 MG/1
975 TABLET ORAL EVERY 6 HOURS PRN
Status: CANCELLED | OUTPATIENT
Start: 2022-02-10

## 2022-02-10 RX ORDER — HYDROXYZINE HYDROCHLORIDE 25 MG/1
100 TABLET, FILM COATED ORAL
Status: CANCELLED | OUTPATIENT
Start: 2022-02-10

## 2022-02-10 RX ORDER — LORAZEPAM 2 MG/ML
2 INJECTION INTRAMUSCULAR EVERY 6 HOURS PRN
Status: CANCELLED | OUTPATIENT
Start: 2022-02-10

## 2022-02-10 RX ORDER — OLANZAPINE 2.5 MG/1
2.5 TABLET ORAL
Status: DISCONTINUED | OUTPATIENT
Start: 2022-02-10 | End: 2022-02-16 | Stop reason: HOSPADM

## 2022-02-10 RX ORDER — ACETAMINOPHEN 325 MG/1
650 TABLET ORAL EVERY 4 HOURS PRN
Status: DISCONTINUED | OUTPATIENT
Start: 2022-02-10 | End: 2022-02-16 | Stop reason: HOSPADM

## 2022-02-10 RX ORDER — OLANZAPINE 10 MG/1
5 INJECTION, POWDER, LYOPHILIZED, FOR SOLUTION INTRAMUSCULAR
Status: DISCONTINUED | OUTPATIENT
Start: 2022-02-10 | End: 2022-02-16 | Stop reason: HOSPADM

## 2022-02-10 RX ORDER — DIPHENHYDRAMINE HYDROCHLORIDE 50 MG/ML
50 INJECTION INTRAMUSCULAR; INTRAVENOUS EVERY 6 HOURS PRN
Status: DISCONTINUED | OUTPATIENT
Start: 2022-02-10 | End: 2022-02-16 | Stop reason: HOSPADM

## 2022-02-10 RX ORDER — BENZTROPINE MESYLATE 1 MG/1
1 TABLET ORAL
Status: DISCONTINUED | OUTPATIENT
Start: 2022-02-10 | End: 2022-02-16 | Stop reason: HOSPADM

## 2022-02-10 RX ORDER — ACETAMINOPHEN 325 MG/1
975 TABLET ORAL EVERY 6 HOURS PRN
Status: DISCONTINUED | OUTPATIENT
Start: 2022-02-10 | End: 2022-02-16 | Stop reason: HOSPADM

## 2022-02-10 RX ORDER — BENZTROPINE MESYLATE 0.5 MG/1
1 TABLET ORAL
Status: CANCELLED | OUTPATIENT
Start: 2022-02-10

## 2022-02-10 RX ORDER — DIPHENHYDRAMINE HYDROCHLORIDE 50 MG/ML
50 INJECTION INTRAMUSCULAR; INTRAVENOUS EVERY 6 HOURS PRN
Status: CANCELLED | OUTPATIENT
Start: 2022-02-10

## 2022-02-10 RX ORDER — ACETAMINOPHEN 325 MG/1
650 TABLET ORAL EVERY 6 HOURS PRN
Status: CANCELLED | OUTPATIENT
Start: 2022-02-10

## 2022-02-10 RX ORDER — POLYETHYLENE GLYCOL 3350 17 G/17G
17 POWDER, FOR SOLUTION ORAL DAILY PRN
Status: CANCELLED | OUTPATIENT
Start: 2022-02-10

## 2022-02-10 RX ORDER — TRAZODONE HYDROCHLORIDE 50 MG/1
50 TABLET ORAL
Status: DISCONTINUED | OUTPATIENT
Start: 2022-02-10 | End: 2022-02-16 | Stop reason: HOSPADM

## 2022-02-10 RX ORDER — HYDROXYZINE 50 MG/1
100 TABLET, FILM COATED ORAL
Status: DISCONTINUED | OUTPATIENT
Start: 2022-02-10 | End: 2022-02-16 | Stop reason: HOSPADM

## 2022-02-10 RX ORDER — OLANZAPINE 10 MG/1
2.5 INJECTION, POWDER, LYOPHILIZED, FOR SOLUTION INTRAMUSCULAR
Status: CANCELLED | OUTPATIENT
Start: 2022-02-10

## 2022-02-10 RX ORDER — OLANZAPINE 10 MG/1
2.5 INJECTION, POWDER, LYOPHILIZED, FOR SOLUTION INTRAMUSCULAR
Status: DISCONTINUED | OUTPATIENT
Start: 2022-02-10 | End: 2022-02-16 | Stop reason: HOSPADM

## 2022-02-10 RX ADMIN — Medication 3 MG: at 23:15

## 2022-02-10 NOTE — ED NOTES
PA Promise    Primary: Medicare A&B  Secondary: [facility]    [phone]

## 2022-02-10 NOTE — ED NOTES
Crisis completed safety and risk assessment with patient in [address]  Patient reports that she was in the hospital about a month ago at [address]  Patient reports that she was doing well until a few days ago and began to hear voices again  The voices were telling her she is fat and ugly  Also experiencing worsening depression, sadness and anxiety  Having thoughts people are out to get her and are against her  Also feels that no one likes her  Patient cannot identify any stressors or triggers for her recent worsening of symptoms  Patient lives at [facility] and has been there for about 6 months  Patient endorses SI and self harm  Stated that she attempted to kill herself using a thumb tack on her arm  Patient does have superficial scratches on her forearm  Patient denies HI and does not have any visual hallucinations  Patient willing to sign 201  Provided patient with her rights

## 2022-02-11 LAB
ATRIAL RATE: 76 BPM
P AXIS: 65 DEGREES
PR INTERVAL: 210 MS
QRS AXIS: 66 DEGREES
QRSD INTERVAL: 86 MS
QT INTERVAL: 382 MS
QTC INTERVAL: 429 MS
T WAVE AXIS: 67 DEGREES
VENTRICULAR RATE: 76 BPM

## 2022-02-11 PROCEDURE — 93010 ELECTROCARDIOGRAM REPORT: CPT | Performed by: INTERNAL MEDICINE

## 2022-02-11 PROCEDURE — 99221 1ST HOSP IP/OBS SF/LOW 40: CPT | Performed by: INTERNAL MEDICINE

## 2022-02-11 PROCEDURE — 99222 1ST HOSP IP/OBS MODERATE 55: CPT | Performed by: STUDENT IN AN ORGANIZED HEALTH CARE EDUCATION/TRAINING PROGRAM

## 2022-02-11 RX ORDER — ASPIRIN 81 MG/1
81 TABLET, CHEWABLE ORAL DAILY
Status: DISCONTINUED | OUTPATIENT
Start: 2022-02-11 | End: 2022-02-16 | Stop reason: HOSPADM

## 2022-02-11 RX ORDER — DIVALPROEX SODIUM 500 MG/1
2000 TABLET, EXTENDED RELEASE ORAL
Status: DISCONTINUED | OUTPATIENT
Start: 2022-02-11 | End: 2022-02-13

## 2022-02-11 RX ORDER — MELATONIN
1000 DAILY
Status: DISCONTINUED | OUTPATIENT
Start: 2022-02-11 | End: 2022-02-16 | Stop reason: HOSPADM

## 2022-02-11 RX ORDER — LISINOPRIL 20 MG/1
20 TABLET ORAL DAILY
Status: DISCONTINUED | OUTPATIENT
Start: 2022-02-11 | End: 2022-02-16 | Stop reason: HOSPADM

## 2022-02-11 RX ORDER — BUPROPION HYDROCHLORIDE 300 MG/1
300 TABLET ORAL DAILY
Status: DISCONTINUED | OUTPATIENT
Start: 2022-02-11 | End: 2022-02-16 | Stop reason: HOSPADM

## 2022-02-11 RX ORDER — FLUOXETINE HYDROCHLORIDE 20 MG/1
80 CAPSULE ORAL DAILY
Status: DISCONTINUED | OUTPATIENT
Start: 2022-02-11 | End: 2022-02-16 | Stop reason: HOSPADM

## 2022-02-11 RX ORDER — ATORVASTATIN CALCIUM 40 MG/1
40 TABLET, FILM COATED ORAL
Status: DISCONTINUED | OUTPATIENT
Start: 2022-02-11 | End: 2022-02-16 | Stop reason: HOSPADM

## 2022-02-11 RX ORDER — ZIPRASIDONE HYDROCHLORIDE 20 MG/1
20 CAPSULE ORAL 2 TIMES DAILY WITH MEALS
Status: DISCONTINUED | OUTPATIENT
Start: 2022-02-11 | End: 2022-02-14

## 2022-02-11 RX ADMIN — Medication 3 MG: at 22:00

## 2022-02-11 RX ADMIN — ACETAMINOPHEN 975 MG: 325 TABLET, FILM COATED ORAL at 13:14

## 2022-02-11 RX ADMIN — BUPROPION HYDROCHLORIDE 300 MG: 300 TABLET, FILM COATED, EXTENDED RELEASE ORAL at 16:17

## 2022-02-11 RX ADMIN — FLUOXETINE 80 MG: 20 CAPSULE ORAL at 16:17

## 2022-02-11 RX ADMIN — ASPIRIN 81 MG CHEWABLE TABLET 81 MG: 81 TABLET CHEWABLE at 16:18

## 2022-02-11 RX ADMIN — DIVALPROEX SODIUM 2000 MG: 500 TABLET, EXTENDED RELEASE ORAL at 22:00

## 2022-02-11 RX ADMIN — ATORVASTATIN CALCIUM 40 MG: 40 TABLET, FILM COATED ORAL at 16:18

## 2022-02-11 RX ADMIN — LISINOPRIL 20 MG: 20 TABLET ORAL at 16:18

## 2022-02-11 RX ADMIN — ZIPRASIDONE HYDROCHLORIDE 20 MG: 20 CAPSULE ORAL at 16:17

## 2022-02-11 RX ADMIN — OLANZAPINE 5 MG: 5 TABLET, FILM COATED ORAL at 13:14

## 2022-02-11 RX ADMIN — HYDROXYZINE HYDROCHLORIDE 100 MG: 50 TABLET, FILM COATED ORAL at 13:14

## 2022-02-11 RX ADMIN — Medication 1000 UNITS: at 16:18

## 2022-02-11 NOTE — ED NOTES
Full report provided to [facility] - no questions at this time       Jaspal Hess RN  02/10/22 5656

## 2022-02-11 NOTE — ED PROVIDER NOTES
History  Chief Complaint   Patient presents with    Suicidal     c/o SI related to "I feel like people don't like me", patient attempted to use thumb-tac to "cut arm to end life"  Small abraised area noted  Denies HI     60-year-old female presents for evaluation of suicidal ideations  Patient took a thumb tack and attempted to slit her left wrist   Patient has a longstanding history of suicidal ideations  No specific inciting factors  P sides the superficial laceration, patient has no further complaints  Denies drug abuse  Patient states she has been compliant with her medications  Patient is calm and cooperative during evaluation  Prior to Admission Medications   Prescriptions Last Dose Informant Patient Reported? Taking?    Diclofenac Sodium (VOLTAREN) 1 %   No No   Sig: Apply 2 g topically 4 (four) times a day   FLUoxetine (PROzac) 40 MG capsule   No No   Sig: Take 2 capsules (80 mg total) by mouth every evening   aspirin (ECOTRIN LOW STRENGTH) 81 mg EC tablet   No No   Sig: Take 1 tablet (81 mg total) by mouth daily   atorvastatin (LIPITOR) 40 mg tablet   No No   Sig: Take 1 tablet (40 mg total) by mouth daily with dinner   buPROPion (WELLBUTRIN XL) 300 mg 24 hr tablet   No No   Sig: Take 1 tablet (300 mg total) by mouth daily   cholecalciferol (VITAMIN D3) 1,000 units tablet   No No   Sig: Take 1 tablet (1,000 Units total) by mouth daily   divalproex sodium (DEPAKOTE ER) 500 mg 24 hr tablet   No No   Sig: Take 4 tablets (2,000 mg total) by mouth daily   fluPHENAZine (PROLIXIN) 5 mg tablet   No No   Sig: Take 3 tablets (15 mg total) by mouth daily at bedtime   fluPHENAZine (PROLIXIN) 5 mg tablet   No No   Sig: Take 1 tablet (5 mg total) by mouth 2 (two) times a day before breakfast and lunch   lisinopril (ZESTRIL) 20 mg tablet   No No   Sig: Take 1 tablet (20 mg total) by mouth daily   melatonin 3 mg   No No   Sig: Take 1 tablet (3 mg total) by mouth daily at bedtime   nystatin (MYCOSTATIN) cream   No No   Sig: Apply topically 2 (two) times a day   polyethylene glycol (MIRALAX) 17 g packet   No No   Sig: Take 17 g by mouth daily   prazosin (MINIPRESS) 2 mg capsule   No No   Sig: Take 1 capsule (2 mg total) by mouth daily at bedtime      Facility-Administered Medications: None       Past Medical History:   Diagnosis Date    Anxiety     Bipolar 1 disorder ([place] [address] )     Bipolar affective disorder, depressed, severe ([place] [address] ) 10/10/2021    Borderline personality disorder ([place] [address] )     CAD (coronary artery disease)     Cognitive impairment     Depression     Dyslipidemia     GERD (gastroesophageal reflux disease)     Hypertension     Obesity     Psychiatric disorder     PTSD (post-traumatic stress disorder)     Schizoaffective disorder ([place] [address] )     Seizure ([place] [address] )        Past Surgical History:   Procedure Laterality Date    APPENDECTOMY      PATIENT DENIES HAVING APPENDIX REMOVED    CHOLECYSTECTOMY      FOOT SURGERY Right        History reviewed  No pertinent family history  I have reviewed and agree with the history as documented  E-Cigarette/Vaping    E-Cigarette Use Never User      E-Cigarette/Vaping Substances    Nicotine No     THC No     CBD No     Flavoring No     Other No     Unknown No      Social History     Tobacco Use    Smoking status: Light Tobacco Smoker    Smokeless tobacco: Never Used    Tobacco comment: Pt stated "smokes when stressed"   Vaping Use    Vaping Use: Never used   Substance Use Topics    Alcohol use: Not Currently    Drug use: Not Currently       Review of Systems   Constitutional: Negative for fever  Skin: Positive for wound  Psychiatric/Behavioral: Positive for suicidal ideas  All other systems reviewed and are negative  Physical Exam  Physical Exam  Vitals and nursing note reviewed  Constitutional:       Appearance: She is well-developed  HENT:      Head: Normocephalic and atraumatic        Right Ear: External ear normal       Left Ear: External ear normal       Nose: Nose normal    Eyes:      General: No scleral icterus  Cardiovascular:      Rate and Rhythm: Normal rate  Pulmonary:      Effort: Pulmonary effort is normal  No respiratory distress  Abdominal:      General: There is no distension  Musculoskeletal:         General: No deformity  Normal range of motion  Cervical back: Normal range of motion  Comments: Superficial abrasion to left wrist   Intact distal pulses  Normal capillary refill  Skin:     Findings: No rash  Neurological:      General: No focal deficit present  Mental Status: She is alert and oriented to person, place, and time     Psychiatric:         Mood and Affect: Mood normal          Vital Signs  ED Triage Vitals   Temperature Pulse Respirations Blood Pressure SpO2   02/10/22 2115 02/10/22 1445 02/10/22 1445 02/10/22 1445 02/10/22 1445   (!) 97 4 °F (36 3 °C) 84 16 132/84 98 %      Temp Source Heart Rate Source Patient Position - Orthostatic VS BP Location FiO2 (%)   02/10/22 2115 02/10/22 1445 02/10/22 1445 02/10/22 1445 --   Temporal Monitor Sitting Left arm       Pain Score       02/10/22 1445       No Pain           Vitals:    02/10/22 1445 02/10/22 2115   BP: 132/84 130/84   Pulse: 84 75   Patient Position - Orthostatic VS: Sitting Lying         Visual Acuity      ED Medications  Medications - No data to display    Diagnostic Studies  Results Reviewed     Procedure Component Value Units Date/Time    POCT pregnancy, urine [[id number]]  (Normal) Resulted: 02/10/22 2119    Lab Status: Final result Updated: 02/10/22 2120     EXT PREG TEST UR (Ref: Negative) Negative     Control Valid    Rapid drug screen, urine [[id number]]  (Normal) Collected: 02/10/22 1715    Lab Status: Final result Specimen: Urine, Clean Catch Updated: 02/10/22 1740     Amph/Meth UR Negative     Barbiturate Ur Negative     Benzodiazepine Urine Negative     Cocaine Urine Negative     Methadone Urine Negative     Opiate Urine Negative     PCP Ur Negative     THC Urine Negative     Oxycodone Urine Negative    Narrative:      FOR MEDICAL PURPOSES ONLY  IF CONFIRMATION NEEDED PLEASE CONTACT THE LAB WITHIN 5 DAYS  Drug Screen Cutoff Levels:  AMPHETAMINE/METHAMPHETAMINES  1000 ng/mL  BARBITURATES     200 ng/mL  BENZODIAZEPINES     200 ng/mL  COCAINE      300 ng/mL  METHADONE      300 ng/mL  OPIATES      300 ng/mL  PHENCYCLIDINE     25 ng/mL  THC       50 ng/mL  OXYCODONE      100 ng/mL    UA w Reflex to Microscopic w Reflex to Culture [[id number]]  (Abnormal) Collected: 02/10/22 1715    Lab Status: Final result Specimen: Urine, Clean Catch Updated: 02/10/22 1723     Color, UA Yellow     Clarity, UA Clear     Specific Gravity, UA 1 015     pH, UA 7 5     Leukocytes, UA Negative     Nitrite, UA Negative     Protein, UA Negative mg/dl      Glucose, UA Negative mg/dl      Ketones, UA Negative mg/dl      Urobilinogen, UA 4 0 E U /dl      Bilirubin, UA Negative     Blood, UA Negative    COVID19, Influenza A/B, RSV PCR, [facility] [[id number]]  (Normal) Collected: 02/10/22 1519    Lab Status: Final result Specimen: Nares from Nose Updated: 02/10/22 1606     SARS-CoV-2 Negative     INFLUENZA A PCR Negative     INFLUENZA B PCR Negative     RSV PCR Negative    Narrative:      FOR PEDIATRIC PATIENTS - copy/paste COVID Guidelines URL to browser: [email or web address]    SARS-CoV-2 assay is a Nucleic Acid Amplification assay intended for the  qualitative detection of nucleic acid from SARS-CoV-2 in nasopharyngeal  swabs  Results are for the presumptive identification of SARS-CoV-2 RNA  Positive results are indicative of infection with SARS-CoV-2, the virus  causing COVID-19, but do not rule out bacterial infection or co-infection  with other viruses  Laboratories within the [place] and its  territories are required to report all positive results to the appropriate  public health authorities  Negative results do not preclude SARS-CoV-2  infection and should not be used as the sole basis for treatment or other  patient management decisions  Negative results must be combined with  clinical observations, patient history, and epidemiological information  This test has not been FDA cleared or approved  This test has been authorized by FDA under an Emergency Use Authorization  (EUA)  This test is only authorized for the duration of time the  declaration that circumstances exist justifying the authorization of the  emergency use of an in vitro diagnostic tests for detection of SARS-CoV-2  virus and/or diagnosis of COVID-19 infection under section 564(b)(1) of  the Act, 21 U  S C  175XCP-6(W)(6), unless the authorization is terminated  or revoked sooner  The test has been validated but independent review by FDA  and CLIA is pending  Test performed using "[employer] GeneXpert: This RT-PCR assay targets N2,  a region unique to SARS-CoV-2  A conserved region in the E-gene was chosen  for pan-Sarbecovirus detection which includes SARS-CoV-2      Comprehensive metabolic panel [[id number]] Collected: 02/10/22 1518    Lab Status: Final result Specimen: Blood from Vein Updated: 02/10/22 1551     Sodium 136 mmol/L      Potassium 4 4 mmol/L      Chloride 101 mmol/L      CO2 31 mmol/L      ANION GAP 4 mmol/L      BUN 16 mg/dL      Creatinine 0 72 mg/dL      Glucose 89 mg/dL      Calcium 9 0 mg/dL      AST 15 U/L      ALT 25 U/L      Alkaline Phosphatase 65 U/L      Total Protein 7 3 g/dL      Albumin 3 6 g/dL      Total Bilirubin 0 60 mg/dL      eGFR 97 ml/min/1 73sq m     Narrative:      [place] guidelines for Chronic Kidney Disease (CKD):     Stage 1 with normal or high GFR (GFR > 90 mL/min/1 73 square meters)    Stage 2 Mild CKD (GFR = 60-89 mL/min/1 73 square meters)    Stage 3A Moderate CKD (GFR = 45-59 mL/min/1 73 square meters)    Stage 3B Moderate CKD (GFR = 30-44 mL/min/1 73 square meters)    Stage 4 Severe CKD (GFR = 15-29 mL/min/1 73 square meters)    Stage 5 End Stage CKD (GFR <15 mL/min/1 73 square meters)  Note: GFR calculation is accurate only with a steady state creatinine    CBC and differential [[id number]]  (Abnormal) Collected: 02/10/22 1518    Lab Status: Final result Specimen: Blood from Vein Updated: 02/10/22 1526     WBC 7 82 Thousand/uL      RBC 4 03 Million/uL      Hemoglobin 12 6 g/dL      Hematocrit 39 5 %      MCV 98 fL      MCH 31 3 pg      MCHC 31 9 g/dL      RDW 14 2 %      MPV 9 5 fL      Platelets 993 Thousands/uL      nRBC 0 /100 WBCs      Neutrophils Relative 60 %      Immat GRANS % 1 %      Lymphocytes Relative 22 %      Monocytes Relative 16 %      Eosinophils Relative 1 %      Basophils Relative 0 %      Neutrophils Absolute 4 64 Thousands/µL      Immature Grans Absolute 0 07 Thousand/uL      Lymphocytes Absolute 1 72 Thousands/µL      Monocytes Absolute 1 28 Thousand/µL      Eosinophils Absolute 0 08 Thousand/µL      Basophils Absolute 0 03 Thousands/µL     POCT alcohol breath test [[id number]]  (Normal) Resulted: 02/10/22 1450    Lab Status: Final result Updated: 02/10/22 1450     EXTBreath Alcohol 0                 No orders to display              Procedures  Procedures         ED Course                                             MDM  Number of Diagnoses or Management Options  Suicidal ideation: new and requires workup  Diagnosis management comments: 60-year-old female presenting with suicidal ideations  T[place]anus was updated 4 years ago  Patient like to sign a 201  Patient is medically cleared for inpatient psychiatric admission           Amount and/or Complexity of Data Reviewed  Clinical lab tests: reviewed and ordered  Tests in the medicine section of CPT®: reviewed and ordered  Decide to obtain previous medical records or to obtain history from someone other than the patient: yes  Review and summarize past medical records: yes        Disposition  Final diagnoses:   Suicidal ideation     Time reflects when diagnosis was documented in both MDM as applicable and the Disposition within this note     Time User Action Codes Description Comment    2/10/2022  2:46 PM Candie Brock Add [C38 042] Suicidal ideation     2/10/2022  9:33 PM Eliana Collins Add [F60 3] Borderline personality disorder [facility])       ED Disposition     ED Disposition Condition Date/Time Comment    Transfer to [facility] Feb 10, 2022  2:46 PM Leydi Chamberlain should be transferred out to [facility] and has been medically cleared          MD Documentation      Most Recent Value   Patient Condition The patient has been stabilized such that within reasonable medical probability, no material deterioration of the patient condition or the condition of the unborn child(courtney) is likely to result from the transfer   Reason for Transfer Level of Care needed not available at this facility   Benefits of Transfer Specialized equipment and/or services available at the receiving facility (Include comment)________________________   Risks of Transfer Potential for delay in receiving treatment, Potential deterioration of medical condition, Increased discomfort during transfer   Accepting Physician Josie Kuhn 67    (Name & Tel number) Jefferson Luna ([place]) [phone]   Transported by Marifer Michael and Unit #) [facility]   Sending MD Rhiannon Carney   Provider Certification General risk, such as traffic hazards, adverse weather conditions, rough terrain or turbulence, possible failure of equipment (including vehicle or aircraft), or consequences of actions of persons outside the control of the transport personnel      RN Documentation      [address] Josie Harris    (Name & Tel number) Jefferson Luna ([place]) [phone]   Medications Reviewed with Next Provider of Service Yes   Transport Mode Ambulance Transported by [employer] and Unit #) [facility]   Level of Care Basic life support   Copies of Medical Records Sent Transfer form   Patient Belongings Disposition Sent with patient      Follow-up Information    None         Patient's Medications   Discharge Prescriptions    No medications on file       No discharge procedures on file      PDMP Review     None          ED Provider  Electronically Signed by           Ryan Silva DO  02/10/22 8676

## 2022-02-11 NOTE — PLAN OF CARE
Problem: Alteration in Thoughts and Perception  Goal: Treatment Goal: Gain control of psychotic behaviors/thinking, reduce/eliminate presenting symptoms and demonstrate improved reality functioning upon discharge  Outcome: Progressing  Goal: Verbalize thoughts and feelings  Description: Interventions:  - Promote a nonjudgmental and trusting relationship with the patient through active listening and therapeutic communication  - Assess patient's level of functioning, behavior and potential for risk  - Engage patient in 1 on 1 interactions  - Encourage patient to express fears, feelings, frustrations, and discuss symptoms    - [place] patient to reality, help patient recognize reality-based thinking   - Administer medications as ordered and assess for potential side effects  - Provide the patient education related to the signs and symptoms of the illness and desired effects of prescribed medications  Outcome: Progressing  Goal: Agree to be compliant with medication regime, as prescribed and report medication side effects  Description: Interventions:  - Offer appropriate PRN medication and supervise ingestion; conduct AIMS, as needed   Outcome: Progressing  Goal: Attend and participate in unit activities, including therapeutic, recreational, and educational groups  Description: Interventions:  -Encourage Visitation and family involvement in care  Outcome: Progressing  Goal: Recognize dysfunctional thoughts, communicate reality-based thoughts at the time of discharge  Description: Interventions:  - Provide medication and psycho-education to assist patient in compliance and developing insight into his/her illness   Outcome: Progressing  Goal: Complete daily ADLs, including personal hygiene independently, as able  Description: Interventions:  - Observe, teach, and assist patient with ADLS  - Monitor and promote a balance of rest/activity, with adequate nutrition and elimination   Outcome: Progressing     Problem: Risk for Self Injury/Neglect  Goal: Treatment Goal: Remain safe during length of stay, learn and adopt new coping skills, and be free of self-injurious ideation, impulses and acts at the time of discharge  Outcome: Progressing  Goal: Verbalize thoughts and feelings  Description: Interventions:  - Assess and re-assess patient's lethality and potential for self-injury  - Engage patient in 1:1 interactions, daily, for a minimum of 15 minutes  - Encourage patient to express feelings, fears, frustrations, hopes  - Establish rapport/trust with patient   Outcome: Progressing  Goal: Refrain from harming self  Description: Interventions:  - Monitor patient closely, per order  - Develop a trusting relationship  - Supervise medication ingestion, monitor effects and side effects   Outcome: Progressing  Goal: Attend and participate in unit activities, including therapeutic, recreational, and educational groups  Description: Interventions:  - Provide therapeutic and educational activities daily, encourage attendance and participation, and document same in the medical record  - Obtain collateral information, encourage visitation and family involvement in care   Outcome: Progressing  Goal: Recognize maladaptive responses and adopt new coping mechanisms  Outcome: Progressing  Goal: Complete daily ADLs, including personal hygiene independently, as able  Description: Interventions:  - Observe, teach, and assist patient with ADLS  - Monitor and promote a balance of rest/activity, with adequate nutrition and elimination  Outcome: Progressing     Problem: Depression  Goal: Treatment Goal: Demonstrate behavioral control of depressive symptoms, verbalize feelings of improved mood/affect, and adopt new coping skills prior to discharge  Outcome: Progressing  Goal: Verbalize thoughts and feelings  Description: Interventions:  - Assess and re-assess patient's level of risk   - Engage patient in 1:1 interactions, daily, for a minimum of 15 minutes   - Encourage patient to express feelings, fears, frustrations, hopes   Outcome: Progressing  Goal: Refrain from harming self  Description: Interventions:  - Monitor patient closely, per order   - Supervise medication ingestion, monitor effects and side effects   Outcome: Progressing  Goal: Refrain from isolation  Description: Interventions:  - Develop a trusting relationship   - Encourage socialization   Outcome: Progressing  Goal: Refrain from self-neglect  Outcome: Progressing  Goal: Attend and participate in unit activities, including therapeutic, recreational, and educational groups  Description: Interventions:  - Provide therapeutic and educational activities daily, encourage attendance and participation, and document same in the medical record   Outcome: Progressing  Goal: Complete daily ADLs, including personal hygiene independently, as able  Description: Interventions:  - Observe, teach, and assist patient with ADLS  -  Monitor and promote a balance of rest/activity, with adequate nutrition and elimination   Outcome: Progressing     Problem: SELF HARM/SUICIDALITY  Goal: Will have no self-injury during hospital stay  Description: INTERVENTIONS:  - Q 15 MINUTES: Routine safety checks  - Q WAKING SHIFT & PRN: Assess risk to determine if routine checks are adequate to maintain patient safety  - Encourage patient to participate actively in care by formulating a plan to combat response to suicidal ideation, identify supports and resources  Outcome: Progressing     Problem: Nutrition/Hydration-ADULT  Goal: Nutrient/Hydration intake appropriate for improving, restoring or maintaining nutritional needs  Description: Monitor and assess patient's nutrition/hydration status for malnutrition  Collaborate with interdisciplinary team and initiate plan and interventions as ordered  Monitor patient's weight and dietary intake as ordered or per policy  Utilize nutrition screening tool and intervene as necessary   Determine patient's food preferences and provide high-protein, high-caloric foods as appropriate       INTERVENTIONS:  - Monitor oral intake, urinary output, labs, and treatment plans  - Assess nutrition and hydration status and recommend course of action  - Evaluate amount of meals eaten  - Assist patient with eating if necessary   - Allow adequate time for meals  - Recommend/ encourage appropriate diets, oral nutritional supplements, and vitamin/mineral supplements  - Order, calculate, and assess calorie counts as needed  - Recommend, monitor, and adjust tube feedings and TPN/PPN based on assessed needs  - Assess need for intravenous fluids  - Provide specific nutrition/hydration education as appropriate  - Include patient/family/caregiver in decisions related to nutrition  Outcome: Progressing

## 2022-02-11 NOTE — CMS CERTIFICATION NOTE
Recertification: Based upon physical, mental and social evaluations, I certify that inpatient psychiatric services continue to be medically necessary for this patient for a duration of 7 midnights for the treatment of  Bipolar affective disorder, depressed, severe, with psychotic behavior ([facility] [address] )   Available alternative community resources still do not meet the patient's mental health care needs  I further attest that an established written individualized plan of care has been updated and is outlined in the patient's medical records

## 2022-02-11 NOTE — PLAN OF CARE
Problem: Risk for Self Injury/Neglect  Goal: Treatment Goal: Remain safe during length of stay, learn and adopt new coping skills, and be free of self-injurious ideation, impulses and acts at the time of discharge  Outcome: Progressing

## 2022-02-11 NOTE — ED NOTES
CW received a call from [facility] admissions requesting Urine Preg test on patient  Patient has not gone to the bathroom yet      [address] [facility]

## 2022-02-11 NOTE — TREATMENT TEAM
02/11/22 1000   Activity/Group Checklist   Group Community meeting  (Self-esteem and goals)   Attendance Attended   Attendance Duration (min) 16-30   Interactions Interacted appropriately   Affect/Mood Appropriate   Goals Achieved Identified feelings; Able to listen to others; Able to engage in interactions     Patient fully participated in community meeting  She shared during the self-esteem activity, whenever it was her turn  She was appropriate and calm throughout  She shared her goals during group

## 2022-02-11 NOTE — CASE MANAGEMENT
CM received a call from Sanam, Pt's care coordinator at [facility] [place]  [employer]  She reported that Pt had been doing well since her discharge in January  She reported they had come up with a rewards chart, & Pt had earned going to get her toes & nails done & going for a haircut  She said that there were two instances when Pt said that she wanted to go to the hospital, but staff was able to talk with her & redirect her & she didn't go  However, Sanam said that Pt didn't tell anyone, she just went & called 9-1-1- & went & sat outside to wait for the ambulance  She said that they out a call from the local police dept that someone had called 911 & they couldn't figure out who it was & then they saw Pt sitting outside  Sanam said that they did try to talk to her, but she insisted she needed to go to the ER  Sanam said that Pt did apparently outreach to her therapist that day & left a message, but by the time she got it, Pt was already en route to the hospital  Sanam said that they are requesting the admission be kept short, & ROSE said that Pt would remain in the hospital through the weekend, most likely Tues would be the earliest day for d/c   CM agreed to call on Monday with an update; Sanam asked that CM call Luis Alfredo Orona at ext  [phone]

## 2022-02-11 NOTE — TREATMENT PLAN
TREATMENT PLAN REVIEW - Dg [age] y o  1970 female MRN: [id number]    [address] [place] [address] Room / Bed: [place] [id number] Encounter: [id number]          Admit Date/Time:  2/10/2022 10:45 PM    Treatment Team: Attending Provider: Jhon Colunga MD; Patient Care Assistant: Rei George; Care Manager: Dagoberto Chiang, RN; Security: Saulo Thorne; Patient Care Assistant: Sonali Roth; Patient Care Technician: Jerson Brennan;  Registered Nurse: Adams Henderson, RN; Charge Nurse: Scotty Andrade, RN; Registered Nurse: Sinan Woodard, RN; Charge Nurse: Mary Grace Dickens, JOSÉ MIGUEL; Registered Nurse: Frederick Daly RN; Consulting Physician: Jessica Murray MD; : Renay Guaman    Diagnosis: Principal Problem:    Bipolar affective disorder, depressed, severe, with psychotic behavior [facility])  Active Problems:    PTSD (post-traumatic stress disorder)    Borderline personality disorder [facility])      Patient Strengths/Assets: cooperative, good past treatment response, patient is on a voluntary commitment    Patient Barriers/Limitations: limited support system    Short Term Goals: decrease in depressive symptoms, decrease in anxiety symptoms, decrease in paranoid thoughts, decrease in psychotic symptoms, decrease in suicidal thoughts, decrease in self abusive behaviors, improvement in self care    Long Term Goals: improvement in depression, improvement in anxiety, resolution of depressive symptoms, resolution of manic symptoms, stabilization of mood, free of suicidal thoughts, improved insight, adequate self care    Progress Towards Goals: starting psychiatric medications as prescribed    Recommended Treatment: medication management, patient medication education, group therapy, milieu therapy, continued Behavioral Health psychiatric evaluation/assessment process    Treatment Frequency: daily medication monitoring, group and milieu therapy daily, monitoring through interdisciplinary rounds, monitoring through weekly patient care conferences    Expected Discharge Date:  5-7 days    Discharge Plan: referral for outpatient medication management with a psychiatrist, referral for outpatient psychotherapy    Treatment Plan Created/Updated By: Humza Hodge MD

## 2022-02-11 NOTE — CONSULTS
[address] 1970, 47 y o  female MRN: [id number]  Unit/Bed#: [facility] 260-02 Encounter: [id number]  Primary Care Provider: Unique Diallo MD   Date and time admitted to hospital: 2/10/2022 10:45 PM    Inpatient consult for Medical Clearance for [address] patient  Consult performed by: Maya Leigh MD  Consult ordered by: Nathalie Hernandez MD        Primary hypertension  Assessment & Plan  · Blood pressure 129/87  · Patient is on lisinopril  · Will continue the same    Class 3 severe obesity due to excess calories without serious comorbidity in [facility])  Assessment & Plan  · Lifestyle modification including daily exercise low-fat diet recommended    Hyperlipidemia  Assessment & Plan  · Continue statin    Medical clearance for psychiatric admission  Assessment & Plan  · Stable medical problems  No active symptoms  · Continue home medications and monitor  · No contraindication for inpatient psych admission      VTE Prophylaxis:  Ambulate    Recommendations for Discharge:  · Resume home medications    Counseling / Coordination of Care Time: 30 minutes  Greater than 50% of total time spent on patient counseling and coordination of care  Collaboration of Care: Were Recommendations Directly Discussed with Primary Treatment Team? - Yes     History of Present Illness:    Abraham Delarosa is a 47 y o  female who is originally admitted to the psychiatry service due to bipolar  We are consulted for medical management  Patient offers no specific complaints today  She has history of hypertension hyperlipidemia and  been compliant with her medications  Denies any chest pain shortness of breath or palpitation      Review of Systems:    Review of Systems  Review systems negative except noted above  Past Medical and Surgical History:     Past Medical History:   Diagnosis Date    Anxiety     Bipolar 1 disorder ([facility] )     Bipolar affective disorder, depressed, severe ([facility] ) 10/10/2021    Borderline personality disorder ([facility] )     CAD (coronary artery disease)     Cognitive impairment     Depression     Dyslipidemia     GERD (gastroesophageal reflux disease)     Hypertension     Obesity     Psychiatric disorder     PTSD (post-traumatic stress disorder)     Schizoaffective disorder (HCC)     Seizure ([facility] )        Past Surgical History:   Procedure Laterality Date    APPENDECTOMY      PATIENT DENIES HAVING APPENDIX REMOVED    CHOLECYSTECTOMY      FOOT SURGERY Right        Meds/Allergies:    all medications and allergies reviewed    Allergies: Allergies   Allergen Reactions    Fish Oil - Food Allergy Other (See Comments)     unknown    Fish-Derived Products - Food Allergy Hives       Social History:     Marital Status: Single    Substance Use History:   Social History     Substance and Sexual Activity   Alcohol Use Not Currently     Social History     Tobacco Use   Smoking Status Never Smoker   Smokeless Tobacco Never Used   Tobacco Comment    Pt stated "smokes when stressed"     Social History     Substance and Sexual Activity   Drug Use Not Currently       Family History:    non-contributory    Physical Exam:     Vitals:   Blood Pressure: 129/87 (02/11/22 1042)  Pulse: 99 (02/11/22 1042)  Temperature: 97 5 °F (36 4 °C) (02/11/22 1042)  Temp Source: Tympanic (02/11/22 1042)  Respirations: 16 (02/11/22 1042)  Height: 5' 4" (162 6 cm) (02/10/22 2249)  Weight - Scale: 120 kg (265 lb 3 2 oz) (02/10/22 2249)  SpO2: 98 % (02/10/22 2249)    Physical Exam    Gen -Patient comfortable   Neck- Supple  No thyromegaly or lymphadenopathy  Lungs-Clear bilaterally without any wheeze or rales   Heart S1-S2, regular rate and rhythm, no murmurs  Abdomen-soft nontender, no organomegaly  Bowel sounds present  Extremities-no cyanosi,  clubbing or edema  Skin- no rash  Neuro-nonfocal       Additional Data:     Lab Results: I have personally reviewed pertinent reports        Results from last 7 days   Lab Units 02/10/22  1518   WBC Thousand/uL 7 82   HEMOGLOBIN g/dL 12 6   HEMATOCRIT % 39 5   PLATELETS Thousands/uL 177   NEUTROS PCT % 60   LYMPHS PCT % 22   MONOS PCT % 16*   EOS PCT % 1     Results from last 7 days   Lab Units 02/10/22  1518   SODIUM mmol/L 136   POTASSIUM mmol/L 4 4   CHLORIDE mmol/L 101   CO2 mmol/L 31   BUN mg/dL 16   CREATININE mg/dL 0 72   ANION GAP mmol/L 4   CALCIUM mg/dL 9 0   ALBUMIN g/dL 3 6   TOTAL BILIRUBIN mg/dL 0 60   ALK PHOS U/L 65   ALT U/L 25   AST U/L 15   GLUCOSE RANDOM mg/dL 89             Lab Results   Component Value Date/Time    HGBA1C 4 8 12/31/2021 07:22 AM    HGBA1C 5 2 12/21/2019 08:10 AM    HGBA1C 4 8 10/19/2019 07:17 AM               Imaging: I have personally reviewed pertinent reports  No orders to display       EKG, Pathology, and Other Studies Reviewed on Admission:   · EKG:     ** Please Note: This note has been constructed using a voice recognition system   **

## 2022-02-11 NOTE — ASSESSMENT & PLAN NOTE
· Stable medical problems    No active symptoms  · Continue home medications and monitor  · No contraindication for inpatient psych admission
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
## 2022-02-11 NOTE — EMTALA/ACUTE CARE TRANSFER
[facility] EMERGENCY DEPARTMENT  [address] [place] [address]  Dept: [phone]      EMTALA TRANSFER CONSENT    NAME Leydi Lawton                                         1970                              MRN [id number]    I have been informed of my rights regarding examination, treatment, and transfer   by Dr Cisco Carrasco DO    Benefits: Specialized equipment and/or services available at the receiving facility (Include comment)________________________    Risks: Potential for delay in receiving treatment,Potential deterioration of medical condition,Increased discomfort during transfer      Consent for Transfer:  I acknowledge that my medical condition has been evaluated and explained to me by the emergency department physician or other qualified medical person and/or my attending physician, who has recommended that I be transferred to the service of  Accepting Physician: Dr Aroldo Kevin at [address] Name, [address] : Ada Mccormick  The above potential benefits of such transfer, the potential risks associated with such transfer, and the probable risks of not being transferred have been explained to me, and I fully understand them  The doctor has explained that, in my case, the benefits of transfer outweigh the risks  I agree to be transferred  I authorize the performance of emergency medical procedures and treatments upon me in both transit and upon arrival at the receiving facility  Additionally, I authorize the release of any and all medical records to the receiving facility and request they be transported with me, if possible  I understand that the safest mode of transportation during a medical emergency is an ambulance and that the Hospital advocates the use of this mode of transport   Risks of traveling to the receiving facility by car, including absence of medical control, life sustaining equipment, such as oxygen, and medical personnel has been explained to me and I fully understand them  (PRAKASH CORRECT BOX BELOW)  [  ]  I consent to the stated transfer and to be transported by ambulance/helicopter  [  ]  I consent to the stated transfer, but refuse transportation by ambulance and accept full responsibility for my transportation by car  I understand the risks of non-ambulance transfers and I exonerate the Hospital and its staff from any deterioration in my condition that results from this refusal     X___________________________________________    DATE  02/10/22  TIME________  Signature of patient or legally responsible individual signing on patient behalf           RELATIONSHIP TO PATIENT_________________________          Provider Certification    NAME Leydi Preciado                                         1970                              MRN [id number]    A medical screening exam was performed on the above named patient  Based on the examination:    Condition Necessitating Transfer The primary encounter diagnosis was Suicidal ideation  A diagnosis of Borderline personality disorder ([facility] ) was also pertinent to this visit      Patient Condition: The patient has been stabilized such that within reasonable medical probability, no material deterioration of the patient condition or the condition of the unborn child(courtney) is likely to result from the transfer    Reason for Transfer: Level of Care needed not available at this facility    Transfer Requirements: [facility]   · Space available and qualified personnel available for treatment as acknowledged by Leonardo Ramon ([address] [facility]) [phone]  · Agreed to accept transfer and to provide appropriate medical treatment as acknowledged by       Dr Sindi Herman  · Appropriate medical records of the examination and treatment of the patient are provided at the time of transfer   [address] _______  · Transfer will be performed by qualified personnel from [facility]  and appropriate transfer equipment as required, including the use of necessary and appropriate life support measures  Provider Certification: I have examined the patient and explained the following risks and benefits of being transferred/refusing transfer to the patient/family:  General risk, such as traffic hazards, adverse weather conditions, rough terrain or turbulence, possible failure of equipment (including vehicle or aircraft), or consequences of actions of persons outside the control of the transport personnel      Based on these reasonable risks and benefits to the patient and/or the unborn child(courtney), and based upon the information available at the time of the patients examination, I certify that the medical benefits reasonably to be expected from the provision of appropriate medical treatments at another medical facility outweigh the increasing risks, if any, to the individuals medical condition, and in the case of labor to the unborn child, from effecting the transfer      X____________________________________________ DATE 02/10/22        TIME_______      ORIGINAL - SEND TO MEDICAL RECORDS   COPY - SEND WITH PATIENT DURING TRANSFER

## 2022-02-11 NOTE — NURSING NOTE
Pt came to the nursing station with c/o severe anxiety  Pt reported having racing thoughts/ thoughts of hurting herself but was able to contract for safety  Repeated back plan to talk to staff if feelings to hurt herself increase  Pt was compliant with PRN medication

## 2022-02-11 NOTE — NURSING NOTE
Patient awake alert, naps during day, Complaints of hip pain, suggested tylenol; patient declined  Patient states has feeling of being SI  Martha Hunt Patient states she will not harm self,and feels safe her  She denies HI  Patient is eating and plans to shower today   She has AH, denies VH

## 2022-02-11 NOTE — ED NOTES
`Insurance Authorization for admission:   Phone call placed to [address] [place] [address]  Phone number: [phone]   Spoke to [facility] approved  Level of care: IP  Review on TBD  Authorization # Call Upon Arrival       EVS (Eligibility Verification System) called - [phone]    Automated system indicates: Medicare A&B primary, [address] [facility] Crisis Worker

## 2022-02-11 NOTE — NURSING NOTE
Patient is a 13 from [facility]  She is well known to us, last discharge was in  December of last year  Patient has long a history of suicidal ideations  Patient lives in a group home ([facility])    Per report patient presented to the ED for evaluation of suicidal ideations  Patient superficially scratched self with a thumb tack below the left wrist     Upon admission to unit patient was cooperative, stated that she has been hearing voices telling her that she is fat and ugly  Patient reports that she has been feeling suicidal  "I cut myself with a thumb tack "  Leydi reports that she has been compliant with her meds  Patient currently reports SI with no plan and is able to contract for safety  Reports auditory hallucinations of voices telling her she is fat and ugly  Denies depression and anxiety  Skin check revealed superficial scratches below Left wrist  Patient received Melatonin 3 mg for sleep

## 2022-02-11 NOTE — ED NOTES
Patient is accepted at [address] ([address])  Patient is accepted by Dr Hanley Frankel per Randi Greer (intake)     Transportation is arranged with [facility]    Transportation is scheduled for 2/10/2022 @2230  Patient may go to the floor at [phone]        Nurse report is to be called to [phone] prior to patient transfer      [address] [place], [place] [address]way Worker

## 2022-02-12 PROCEDURE — 99232 SBSQ HOSP IP/OBS MODERATE 35: CPT | Performed by: STUDENT IN AN ORGANIZED HEALTH CARE EDUCATION/TRAINING PROGRAM

## 2022-02-12 RX ADMIN — FLUOXETINE 80 MG: 20 CAPSULE ORAL at 09:09

## 2022-02-12 RX ADMIN — DIVALPROEX SODIUM 2000 MG: 500 TABLET, EXTENDED RELEASE ORAL at 22:05

## 2022-02-12 RX ADMIN — Medication 1000 UNITS: at 09:09

## 2022-02-12 RX ADMIN — ATORVASTATIN CALCIUM 40 MG: 40 TABLET, FILM COATED ORAL at 16:20

## 2022-02-12 RX ADMIN — ZIPRASIDONE HYDROCHLORIDE 20 MG: 20 CAPSULE ORAL at 16:20

## 2022-02-12 RX ADMIN — LISINOPRIL 20 MG: 20 TABLET ORAL at 09:09

## 2022-02-12 RX ADMIN — Medication 3 MG: at 22:05

## 2022-02-12 RX ADMIN — HYDROXYZINE HYDROCHLORIDE 100 MG: 50 TABLET, FILM COATED ORAL at 12:18

## 2022-02-12 RX ADMIN — BUPROPION HYDROCHLORIDE 300 MG: 300 TABLET, FILM COATED, EXTENDED RELEASE ORAL at 09:09

## 2022-02-12 RX ADMIN — OLANZAPINE 5 MG: 5 TABLET, FILM COATED ORAL at 12:18

## 2022-02-12 RX ADMIN — ASPIRIN 81 MG CHEWABLE TABLET 81 MG: 81 TABLET CHEWABLE at 09:09

## 2022-02-12 RX ADMIN — ZIPRASIDONE HYDROCHLORIDE 20 MG: 20 CAPSULE ORAL at 09:09

## 2022-02-12 NOTE — PROGRESS NOTES
Treatment Plan Meeting:  Diagnosis of Bipolar affective disorder, depressed, severe, with psychotic behavior, PTSD, & Borderline personality disorder reviewed  Discussed short term goals for decrease in depression, anxiety, paranoid thoughts, decrease in psychotic symptoms, decrease in suicidal thoughts, decrease in self abusive behaviors, improvement in self care  Pt will return to her [facility] upon discharge, & they are working with her on next steps for housing, as she was able to remain out of the hospital for 5 weeks  At this time, discharge is tentative for early next week  All parties in agreement & treatment plan was signed       02/11/22 6700   Team Meeting   Meeting Type Tx Team Meeting   Initial Conference Date 02/11/22   Next Conference Date 03/10/22   Team Members Present   Team Members Present Physician;Nurse;   Physician Team Member Dr Lor Delgado Team Member [facility] Management Team Member Eldon   Patient/Family Present   Patient Present Yes   Patient's Family Present No

## 2022-02-12 NOTE — PROGRESS NOTES
Status: Pt is a 89 from [address] [place] [address] who presented with A/H that were negative  Pt reportedly also has superficial cuts to her arm from a pushpin that she used to scratch herself  Pt was malodorous upon arrival to the unit  Pt pleasant & cooperative & slept overnight  Reviewed readmit score: 29(RED), AUDIT: 0, PAWSS: 0, BAT: 0, UDS: negative  Medication: to be reviewed / no PRN   D/C: TBD / new admission     02/11/22 0750   Team Members Present   Team Members Present Physician;Nurse;; Other (Discipline and Name)   Physician Team Member Dr Emilee Santana / Alfonso Monae / Lisa Decent Member Pradeep Graff / Nidia Worley Management Team Member Juan F Dumas / Lisa Nix   Other (Discipline and Name) Shira Willson (art therapy)   Patient/Family Present   Patient Present No   Patient's Family Present No

## 2022-02-12 NOTE — NURSING NOTE
Pt reports elevated anxiety and racing thoughts  Pt reports fleeting SI, contracts for safety  Pt received Atarax 100 mg po for severe anxiety and Zyprexa 5 mg po for severe agitation @1218  Smith score 25  Upon follow up, pt sleeping in bed  Medication effective

## 2022-02-12 NOTE — PROGRESS NOTES
Progress Note - Behavioral Health   Leydi Cheatham 47 y o  female MRN: [id number]  Unit/Bed#: U 260-02 Encounter: [id number]    Assessment/Plan   Principal Problem:    Bipolar affective disorder, depressed, severe, with psychotic behavior ([facility] )  Active Problems:    Medical clearance for psychiatric admission    Primary hypertension    Hyperlipidemia    Class 3 severe obesity due to excess calories without serious comorbidity in adult [facility])    PTSD (post-traumatic stress disorder)    Borderline personality disorder ([facility] )      Recommended Treatment:    Continue current medications    Continue with group therapy, milieu therapy and occupational therapy  Continue frequent safety checks and vitals per unit protocol  Case discussed with treatment team   Risks, benefits and possible side effects of Medications: Risks, benefits, and possible side effects of medications have previously been explained  No new medications at this time  ------------------------------------------------------------    Subjective: Per nursing report, Santiago Vasquez has been generally cooperative on the unit and compliant with medications, though she did refuse labs  Today, Leydi is consenting for safety on the unit  She reports continued command auditory hallucinations to cut her wrists and throat but denies intention to act on these thoughts  She reports significant feelings of depression and hopelessness as a result of her command auditory hallucinations  Progress Toward Goals: slow improvement    Psychiatric Review of Systems:  Behavior over the last 24 hours: unchanged  Sleep: normal  Appetite: adequate  Medication side effects: none verbalized  ROS: Complete review of systems is negative except as noted above      Vital signs in last 24 hours:  Temp:  [97 6 °F (36 4 °C)-98 9 °F (37 2 °C)] 98 1 °F (36 7 °C)  HR:  [70-86] 86  Resp:  [16-17] 17  BP: (105-120)/(53-68) 105/53    Mental Status Exam:  Appearance:  alert, good eye contact, casually dressed and appropriate grooming and hygiene   Behavior:  calm and cooperative   Motor: no abnormal movements   Speech:  spontaneous, scant and coherent   Mood:  depressed   Affect:  constricted   Thought Process:  Organized, logical, goal-directed   Thought Content: no verbalized delusions or overt paranoia, ruminating thoughts, negative thoughts   Perceptual disturbances: auditory hallucinations Of voices telling her to cut her wrists and does not appear to be responding to internal stimuli at this time   Risk Potential: No active homicidal ideation, Suicidal ideation with plan to cut her wrists without intention and layo for safety   Cognition: oriented to self and situation, appears to be below average intelligence and cognition not formally tested   Insight:  Limited   Judgment: Fair     Current Medications:  Current Facility-Administered Medications   Medication Dose Route Frequency Provider Last Rate    acetaminophen  650 mg Oral Q6H PRN Edita Sosa MD      acetaminophen  650 mg Oral Q4H PRN Edita Sosa MD      acetaminophen  975 mg Oral Q6H PRN Edita Sosa MD      aluminum-magnesium hydroxide-simethicone  30 mL Oral Q4H PRN Edita Sosa MD      artificial tear  1 application Both Eyes E7X PRN Edita Sosa MD      aspirin  81 mg Oral Daily Tre Hurst MD      atorvastatin  40 mg Oral Daily With Osiris Grewal MD      benztropine  1 mg Oral Q4H PRN Max 6/day Edita Sosa MD      bisacodyl  10 mg Rectal Daily PRN Edita Sosa MD      buPROPion  300 mg Oral Daily Tre Hurst MD      cholecalciferol  1,000 Units Oral Daily Tre Hurst MD      hydrOXYzine HCL  50 mg Oral Q6H PRN Max 4/day Edita Sosa MD      Or    diphenhydrAMINE  50 mg Intramuscular Q6H PRN Edita Sosa MD      divalproex sodium  2,000 mg Oral HS MD Marly Michael FLUoxetine  80 mg Oral Daily Anisha Goldberg MD      hydrOXYzine HCL  100 mg Oral Q6H PRN Max 4/day Etelvina Yun MD      Or    LORazepam  2 mg Intramuscular Q6H PRN Etelvina Yun MD      hydrOXYzine HCL  25 mg Oral Q6H PRN Max 4/day Etelvina Yun MD      lisinopril  20 mg Oral Daily Anisha Goldberg MD      melatonin  3 mg Oral HS Etelvina Yun MD      nicotine  1 patch Transdermal Daily Etelvina Yun MD      OLANZapine  5 mg Oral Q4H PRN Max 3/day Etelvina Yun MD      Or   Pina May OLANZapine  2 5 mg Intramuscular Q4H PRN Max 3/day Etelvina Yun MD      OLANZapine  5 mg Oral Q3H PRN Max 3/day Etelvina Yun MD      Or    OLANZapine  5 mg Intramuscular Q3H PRN Max 3/day Etelvina Yun MD      OLANZapine  2 5 mg Oral Q4H PRN Max 6/day Etelvina Yun MD      polyethylene glycol  17 g Oral Daily PRN Etelvina Yun MD      senna-docusate sodium  1 tablet Oral Daily PRN Etelvina Yun MD      traZODone  50 mg Oral HS PRN Etelvina Yun MD      ziprasidone  20 mg Oral BID With Meals Anisha Goldberg MD         Behavioral Health Medications: all current active meds have been reviewed  Changes as in plan section above  Laboratory results:  I have personally reviewed all pertinent laboratory/tests results    Recent Results (from the past 48 hour(s))   Rapid drug screen, urine    Collection Time: 02/10/22  5:15 PM   Result Value Ref Range    Amph/Meth UR Negative Negative    Barbiturate Ur Negative Negative    Benzodiazepine Urine Negative Negative    Cocaine Urine Negative Negative    Methadone Urine Negative Negative    Opiate Urine Negative Negative    PCP Ur Negative Negative    THC Urine Negative Negative    Oxycodone Urine Negative Negative   UA w Reflex to Microscopic w Reflex to Culture    Collection Time: 02/10/22  5:15 PM    Specimen: Urine, Clean Catch   Result Value Ref Range    Color, UA Yellow     Clarity, UA Clear     Specific [place], UA 1 015 1 003 - 1 030    pH, UA 7 5 4 5, 5 0, 5 5, 6 0, 6 5, 7 0, 7 5, 8 0    Leukocytes, UA Negative Negative    Nitrite, UA Negative Negative    Protein, UA Negative Negative mg/dl    Glucose, UA Negative Negative mg/dl    Ketones, UA Negative Negative mg/dl    Urobilinogen, UA 4 0 (A) 0 2, 1 0 E U /dl E U /dl    Bilirubin, UA Negative Negative    Blood, UA Negative Negative   POCT pregnancy, urine    Collection Time: 02/10/22  9:19 PM   Result Value Ref Range    EXT PREG TEST UR (Ref: Negative) Negative     Control Valid         Art Benoit MD

## 2022-02-12 NOTE — CASE MANAGEMENT
Readmit score:  29 (RED)   Confirmed Address:        [facility]: [address] [facility])  [place], [place], [address]   Resides in the home with:    [facility] - multiple unrelated individuals   Pt does have her own room with her own mini fridge & TV  Pt said that she thinks she would like to have a roommate, as she gets lonely  Pt Will Return Home at Discharge: Yes   Confirmed Phone Number: (residence) [phone]  Fax: [phone]  Care Coordinator - Sanam ext  [phone] or Yayo ext  [phone]   Confirmed Email Address: None    Marital Status:         Children: Single, never      None   Family History:            Adopted mother -   Does have some aunts but minimally involved  Commitment Status: 201   Admitted from:    [facility] on 2/10/2022 at 1447   Presenting C/O:       Pt reporting she started to hear voices a few days ago, saying I'm fat & ugly  Pt said that she got really sad & suicidal & came to the hospital      Past Inpatient Tx:    2021 to 2022: [facility]  -: [facility]  -2021: [facility]  -: [facility]  10/9-15/2021: [facility]  2021: Aura Simper  2021: Chandrika  2021: Lindsey  2021: [address]  : [facility]  2021: Davion Barillas  -: [facility]  Pt reported first hospitalization at age 18 y/o   Current outpatient:     Psychiatrist:  Collin Teran  [phone]                F  [phone]   Therapist:    Leandro Post ext  [phone]      ACT/ICM/CPS/WRT/SC: N/A has residential care coordinator - Sanam   PCP:    Unsure   Hx:    Pt reported that she fell a few weeks ago & hurt her hip  Pt said that her doctor ordered an X-Ray but she didn't get it done yet  Pt reported last seizure was over 4 yrs ago   Medications:    Pt said that she takes her medications, however, she said that she doesn't want to take Depakote anymore, because the pills are too big to swallow  Pharmacy:    [facility]  F  [phone]   Spirituality/[employer]:    Faith   Education:    12th grade / special education   Work/Income:     Preferred time for appts: SSDI - unknown amount        Residence schedules   Legal:      Probation/[place] Ofc: Pt denied      N/A   Access to Firearms:    Pt denied   Transportation:    Residence provides transportation as needed   Referrals Needed: None - Pt lives in a [facility] who provide for all her needs  Thomas Bojorquez has outpatient at [facility]  Transport at Discharge:    Red Coppola will transport home  [address] [facility] Text PRECIOUS: N/A   Emergency Contact:     Sandhya Collins(aunt)[phone]      ROIs obtained:       [facility] - outpatient & residential     Insurance:     Medicare A+B   COB: [id number]   Audit: 0       PAWSS: 0 BAT: 0 UDS: negative   Substance Abuse: Freq   Amount Last Use Notes:   Heroin           Amp/Meth           Alcohol           Cocaine           Cannabis           Benzodiazepine           Barbituartes           Other           Tobacco Pt denied any current smoking

## 2022-02-12 NOTE — NURSING NOTE
Calm and cooperative  Reports sleeping well over night  Visible in the milieu, social with select peers  Reports fleeting SI, verbally contracts for safety  Denies HI  Currently denies AVH  Compliant with medications

## 2022-02-13 LAB
25(OH)D3 SERPL-MCNC: 23.3 NG/ML (ref 30–100)
CHOLEST SERPL-MCNC: 160 MG/DL
FOLATE SERPL-MCNC: 7.3 NG/ML (ref 3.1–17.5)
HDLC SERPL-MCNC: 38 MG/DL
LDLC SERPL CALC-MCNC: 91 MG/DL (ref 0–100)
NONHDLC SERPL-MCNC: 122 MG/DL
TRIGL SERPL-MCNC: 157 MG/DL
TSH SERPL DL<=0.05 MIU/L-ACNC: 1.58 UIU/ML (ref 0.36–3.74)
VIT B12 SERPL-MCNC: 522 PG/ML (ref 100–900)

## 2022-02-13 PROCEDURE — 99232 SBSQ HOSP IP/OBS MODERATE 35: CPT | Performed by: STUDENT IN AN ORGANIZED HEALTH CARE EDUCATION/TRAINING PROGRAM

## 2022-02-13 PROCEDURE — 80061 LIPID PANEL: CPT | Performed by: PHYSICIAN ASSISTANT

## 2022-02-13 PROCEDURE — 82607 VITAMIN B-12: CPT | Performed by: PHYSICIAN ASSISTANT

## 2022-02-13 PROCEDURE — 84443 ASSAY THYROID STIM HORMONE: CPT | Performed by: PHYSICIAN ASSISTANT

## 2022-02-13 PROCEDURE — 86592 SYPHILIS TEST NON-TREP QUAL: CPT | Performed by: PHYSICIAN ASSISTANT

## 2022-02-13 PROCEDURE — 82746 ASSAY OF FOLIC ACID SERUM: CPT | Performed by: PHYSICIAN ASSISTANT

## 2022-02-13 PROCEDURE — 82306 VITAMIN D 25 HYDROXY: CPT | Performed by: PHYSICIAN ASSISTANT

## 2022-02-13 RX ORDER — DIVALPROEX SODIUM 250 MG/1
2000 TABLET, EXTENDED RELEASE ORAL
Status: DISCONTINUED | OUTPATIENT
Start: 2022-02-13 | End: 2022-02-16 | Stop reason: HOSPADM

## 2022-02-13 RX ADMIN — FLUOXETINE 80 MG: 20 CAPSULE ORAL at 08:54

## 2022-02-13 RX ADMIN — Medication 1000 UNITS: at 08:54

## 2022-02-13 RX ADMIN — Medication 3 MG: at 22:03

## 2022-02-13 RX ADMIN — DIVALPROEX SODIUM 2000 MG: 250 TABLET, FILM COATED, EXTENDED RELEASE ORAL at 22:12

## 2022-02-13 RX ADMIN — LISINOPRIL 20 MG: 20 TABLET ORAL at 08:54

## 2022-02-13 RX ADMIN — ZIPRASIDONE HYDROCHLORIDE 20 MG: 20 CAPSULE ORAL at 16:42

## 2022-02-13 RX ADMIN — ZIPRASIDONE HYDROCHLORIDE 20 MG: 20 CAPSULE ORAL at 08:54

## 2022-02-13 RX ADMIN — ATORVASTATIN CALCIUM 40 MG: 40 TABLET, FILM COATED ORAL at 16:42

## 2022-02-13 RX ADMIN — ASPIRIN 81 MG CHEWABLE TABLET 81 MG: 81 TABLET CHEWABLE at 08:54

## 2022-02-13 RX ADMIN — BUPROPION HYDROCHLORIDE 300 MG: 300 TABLET, FILM COATED, EXTENDED RELEASE ORAL at 08:54

## 2022-02-13 NOTE — NURSING NOTE
Calm and cooperative  Reports good sleep over night  Reports fleeting SI, verbally contracts for safety  Denies AH  Denies questions/concerns

## 2022-02-13 NOTE — TREATMENT TEAM
02/12/22 0700   Team Meeting   Meeting Type Daily Rounds   Team Members Present   Team Members Present Physician;Nurse   Physician Team Member Dr Cherelle Oh   Nursing Team Member CM   Patient/Family Present   Patient Present No   Patient's Family Present No     AM rounds- pleasant and social  Fleeting SI, elevated anxiety and racing thoughts  Med compliant  Slept well

## 2022-02-13 NOTE — PROGRESS NOTES
Progress Note - Behavioral Health   Leydi Dotson 47 y o  female MRN: [id number]  Unit/Bed#: -02 Encounter: [id number]    Assessment/Plan   Principal Problem:    Bipolar affective disorder, depressed, severe, with psychotic behavior ([facility] )  Active Problems:    Medical clearance for psychiatric admission    Primary hypertension    Hyperlipidemia    Class 3 severe obesity due to excess calories without serious comorbidity in adult [facility])    PTSD (post-traumatic stress disorder)    Borderline personality disorder ([facility] )      Recommended Treatment:    Continue current medications    Continue with group therapy, milieu therapy and occupational therapy  Continue frequent safety checks and vitals per unit protocol  Case discussed with treatment team   Risks, benefits and possible side effects of Medications: Risks, benefits, and possible side effects of medications have previously been explained  No new medications at this time  ------------------------------------------------------------    Subjective: Per nursing report, Mariel Narvaez has been having some behavioral problems but overall cooperative on the unit and compliant with medications  Today, Leydi is consenting for safety on the unit  She reports continued auditory hallucinations commanding her to harm herself  She states the voice is her ex-boyfriend  She reports difficulty sleeping last night due to nightmares but cannot recall the content  She denies other complaints  Progress Toward Goals: slow improvement    Psychiatric Review of Systems:  Behavior over the last 24 hours: unchanged  Sleep: insomnia and nightmares  Appetite: adequate  Medication side effects: none verbalized  ROS: Complete review of systems is negative except as noted above      Vital signs in last 24 hours:  Temp:  [97 9 °F (36 6 °C)-98 1 °F (36 7 °C)] 97 9 °F (36 6 °C)  HR:  [72-86] 72  Resp:  [17] 17  BP: (105-112)/(53-56) 112/56    Mental Status Exam:  Appearance:  alert, Poor eye contact, appears stated age, casually dressed, appropriate grooming and hygiene and obese   Behavior:  calm and laying in bed   Motor: no abnormal movements   Speech:  spontaneous and coherent   Mood:  depressed and anxious   Affect:  mood-congruent and blunted   Thought Process:  Organized, logical, goal-directed   Thought Content: no verbalized delusions or overt paranoia   Perceptual disturbances: auditory hallucinations Of her ex-boyfriend's voice telling her to harm herself   Risk Potential: No active suicidal ideation, No active homicidal ideation   Cognition: oriented to self and situation, appears to be below average intelligence and cognition not formally tested   Insight:  Limited   Judgment: Fair     Current Medications:  Current Facility-Administered Medications   Medication Dose Route Frequency Provider Last Rate    acetaminophen  650 mg Oral Q6H PRN Tabatha Garner MD      acetaminophen  650 mg Oral Q4H PRN Tabatha Garner MD      acetaminophen  975 mg Oral Q6H PRN Tabatha Garner MD      aluminum-magnesium hydroxide-simethicone  30 mL Oral Q4H PRN Tabatha Garner MD      artificial tear  1 application Both Eyes S6X PRN Tabatha Garner MD      aspirin  81 mg Oral Daily Ravi Longo MD      atorvastatin  40 mg Oral Daily With Mor Akbar MD      benztropine  1 mg Oral Q4H PRN Max 6/day Tabatha Garner MD      bisacodyl  10 mg Rectal Daily PRN Tabatha Garner MD      buPROPion  300 mg Oral Daily Ravi Longo MD      cholecalciferol  1,000 Units Oral Daily Ravi Longo MD      hydrOXYzine HCL  50 mg Oral Q6H PRN Max 4/day Tabatha Garner MD      Or    diphenhydrAMINE  50 mg Intramuscular Q6H PRN Tabatha Garner MD      divalproex sodium  2,000 mg Oral HS Ravi Longo MD      FLUoxetine  80 mg Oral Daily Ravi Longo MD      hydrOXYzine HCL  100 mg Oral Q6H PRN Max 4/day Lukas Craft MD      Or    LORazepam  2 mg Intramuscular Q6H PRN Lukas Craft MD      hydrOXYzine HCL  25 mg Oral Q6H PRN Max 4/day Lukas Craft MD      lisinopril  20 mg Oral Daily Leonor Rodgers MD      melatonin  3 mg Oral HS Lukas Craft MD      nicotine  1 patch Transdermal Daily Lukas Craft MD      OLANZapine  5 mg Oral Q4H PRN Max 3/day Lukas Craft MD      Or    OLANZapine  2 5 mg Intramuscular Q4H PRN Max 3/day Lukas Craft MD      OLANZapine  5 mg Oral Q3H PRN Max 3/day Lukas Craft MD      Or    OLANZapine  5 mg Intramuscular Q3H PRN Max 3/day Lukas Craft MD      OLANZapine  2 5 mg Oral Q4H PRN Max 6/day Lukas Craft MD      polyethylene glycol  17 g Oral Daily PRN Lukas Craft MD      senna-docusate sodium  1 tablet Oral Daily PRN Lukas Craft MD      traZODone  50 mg Oral HS PRN Lukas Craft MD      ziprasidone  20 mg Oral BID With Meals Leonor Rodgers MD         Behavioral Health Medications: all current active meds have been reviewed  Changes as in plan section above  Laboratory results:  I have personally reviewed all pertinent laboratory/tests results    Recent Results (from the past 48 hour(s))   Lipid panel    Collection Time: 02/13/22  6:15 AM   Result Value Ref Range    Cholesterol 160 See Comment mg/dL    Triglycerides 157 (H) See Comment mg/dL    HDL, Direct 38 (L) >=50 mg/dL    LDL Calculated 91 0 - 100 mg/dL    Non-HDL-Chol (CHOL-HDL) 122 mg/dl   TSH, 3rd generation    Collection Time: 02/13/22  6:15 AM   Result Value Ref Range    TSH 3RD GENERATON 1 585 0 358 - 3 740 uIU/mL        Pablito Wang MD

## 2022-02-13 NOTE — PLAN OF CARE
Problem: Alteration in Thoughts and Perception  Goal: Treatment Goal: Gain control of psychotic behaviors/thinking, reduce/eliminate presenting symptoms and demonstrate improved reality functioning upon discharge  Outcome: Progressing  Goal: Verbalize thoughts and feelings  Description: Interventions:  - Promote a nonjudgmental and trusting relationship with the patient through active listening and therapeutic communication  - Assess patient's level of functioning, behavior and potential for risk  - Engage patient in 1 on 1 interactions  - Encourage patient to express fears, feelings, frustrations, and discuss symptoms    - [place] patient to reality, help patient recognize reality-based thinking   - Administer medications as ordered and assess for potential side effects  - Provide the patient education related to the signs and symptoms of the illness and desired effects of prescribed medications  Outcome: Progressing  Goal: Agree to be compliant with medication regime, as prescribed and report medication side effects  Description: Interventions:  - Offer appropriate PRN medication and supervise ingestion; conduct AIMS, as needed   Outcome: Progressing  Goal: Attend and participate in unit activities, including therapeutic, recreational, and educational groups  Description: Interventions:  -Encourage Visitation and family involvement in care  Outcome: Progressing  Goal: Recognize dysfunctional thoughts, communicate reality-based thoughts at the time of discharge  Description: Interventions:  - Provide medication and psycho-education to assist patient in compliance and developing insight into his/her illness   Outcome: Progressing  Goal: Complete daily ADLs, including personal hygiene independently, as able  Description: Interventions:  - Observe, teach, and assist patient with ADLS  - Monitor and promote a balance of rest/activity, with adequate nutrition and elimination   Outcome: Progressing     Problem: Risk for Self Injury/Neglect  Goal: Treatment Goal: Remain safe during length of stay, learn and adopt new coping skills, and be free of self-injurious ideation, impulses and acts at the time of discharge  Outcome: Progressing  Goal: Verbalize thoughts and feelings  Description: Interventions:  - Assess and re-assess patient's lethality and potential for self-injury  - Engage patient in 1:1 interactions, daily, for a minimum of 15 minutes  - Encourage patient to express feelings, fears, frustrations, hopes  - Establish rapport/trust with patient   Outcome: Progressing  Goal: Refrain from harming self  Description: Interventions:  - Monitor patient closely, per order  - Develop a trusting relationship  - Supervise medication ingestion, monitor effects and side effects   Outcome: Progressing  Goal: Attend and participate in unit activities, including therapeutic, recreational, and educational groups  Description: Interventions:  - Provide therapeutic and educational activities daily, encourage attendance and participation, and document same in the medical record  - Obtain collateral information, encourage visitation and family involvement in care   Outcome: Progressing  Goal: Recognize maladaptive responses and adopt new coping mechanisms  Outcome: Progressing  Goal: Complete daily ADLs, including personal hygiene independently, as able  Description: Interventions:  - Observe, teach, and assist patient with ADLS  - Monitor and promote a balance of rest/activity, with adequate nutrition and elimination  Outcome: Progressing     Problem: Depression  Goal: Treatment Goal: Demonstrate behavioral control of depressive symptoms, verbalize feelings of improved mood/affect, and adopt new coping skills prior to discharge  Outcome: Progressing  Goal: Verbalize thoughts and feelings  Description: Interventions:  - Assess and re-assess patient's level of risk   - Engage patient in 1:1 interactions, daily, for a minimum of 15 minutes   - Encourage patient to express feelings, fears, frustrations, hopes   Outcome: Progressing  Goal: Refrain from harming self  Description: Interventions:  - Monitor patient closely, per order   - Supervise medication ingestion, monitor effects and side effects   Outcome: Progressing  Goal: Refrain from isolation  Description: Interventions:  - Develop a trusting relationship   - Encourage socialization   Outcome: Progressing  Goal: Refrain from self-neglect  Outcome: Progressing  Goal: Attend and participate in unit activities, including therapeutic, recreational, and educational groups  Description: Interventions:  - Provide therapeutic and educational activities daily, encourage attendance and participation, and document same in the medical record   Outcome: Progressing  Goal: Complete daily ADLs, including personal hygiene independently, as able  Description: Interventions:  - Observe, teach, and assist patient with ADLS  -  Monitor and promote a balance of rest/activity, with adequate nutrition and elimination   Outcome: Progressing     Problem: SELF HARM/SUICIDALITY  Goal: Will have no self-injury during hospital stay  Description: INTERVENTIONS:  - Q 15 MINUTES: Routine safety checks  - Q WAKING SHIFT & PRN: Assess risk to determine if routine checks are adequate to maintain patient safety  - Encourage patient to participate actively in care by formulating a plan to combat response to suicidal ideation, identify supports and resources  Outcome: Progressing     Problem: Nutrition/Hydration-ADULT  Goal: Nutrient/Hydration intake appropriate for improving, restoring or maintaining nutritional needs  Description: Monitor and assess patient's nutrition/hydration status for malnutrition  Collaborate with interdisciplinary team and initiate plan and interventions as ordered  Monitor patient's weight and dietary intake as ordered or per policy  Utilize nutrition screening tool and intervene as necessary   Determine patient's food preferences and provide high-protein, high-caloric foods as appropriate       INTERVENTIONS:  - Monitor oral intake, urinary output, labs, and treatment plans  - Assess nutrition and hydration status and recommend course of action  - Evaluate amount of meals eaten  - Assist patient with eating if necessary   - Allow adequate time for meals  - Recommend/ encourage appropriate diets, oral nutritional supplements, and vitamin/mineral supplements  - Order, calculate, and assess calorie counts as needed  - Recommend, monitor, and adjust tube feedings and TPN/PPN based on assessed needs  - Assess need for intravenous fluids  - Provide specific nutrition/hydration education as appropriate  - Include patient/family/caregiver in decisions related to nutrition  Outcome: Progressing

## 2022-02-13 NOTE — TREATMENT TEAM
02/13/22 0700   Team Meeting   Meeting Type Daily Rounds   Team Members Present   Team Members Present Physician;Nurse   Physician Team Member Dr Juan Lowery Team Member CM   Patient/Family Present   Patient Present No   Patient's Family Present No     AM rounds- reports fleeting SI-verbally contracts for safety  Racing thoughts and elevated anxiety  Requested and received prn medication, which were effective  Social with peers and attends most groups  Slept well

## 2022-02-13 NOTE — PLAN OF CARE
Problem: Alteration in Thoughts and Perception  Goal: Treatment Goal: Gain control of psychotic behaviors/thinking, reduce/eliminate presenting symptoms and demonstrate improved reality functioning upon discharge  Outcome: Progressing  Goal: Verbalize thoughts and feelings  Description: Interventions:  - Promote a nonjudgmental and trusting relationship with the patient through active listening and therapeutic communication  - Assess patient's level of functioning, behavior and potential for risk  - Engage patient in 1 on 1 interactions  - Encourage patient to express fears, feelings, frustrations, and discuss symptoms    - [place] patient to reality, help patient recognize reality-based thinking   - Administer medications as ordered and assess for potential side effects  - Provide the patient education related to the signs and symptoms of the illness and desired effects of prescribed medications  Outcome: Progressing  Goal: Agree to be compliant with medication regime, as prescribed and report medication side effects  Description: Interventions:  - Offer appropriate PRN medication and supervise ingestion; conduct AIMS, as needed   Outcome: Progressing  Goal: Attend and participate in unit activities, including therapeutic, recreational, and educational groups  Description: Interventions:  -Encourage Visitation and family involvement in care  Outcome: Progressing  Goal: Recognize dysfunctional thoughts, communicate reality-based thoughts at the time of discharge  Description: Interventions:  - Provide medication and psycho-education to assist patient in compliance and developing insight into his/her illness   Outcome: Progressing  Goal: Complete daily ADLs, including personal hygiene independently, as able  Description: Interventions:  - Observe, teach, and assist patient with ADLS  - Monitor and promote a balance of rest/activity, with adequate nutrition and elimination   Outcome: Progressing     Problem: Risk for Self Injury/Neglect  Goal: Treatment Goal: Remain safe during length of stay, learn and adopt new coping skills, and be free of self-injurious ideation, impulses and acts at the time of discharge  Outcome: Progressing  Goal: Verbalize thoughts and feelings  Description: Interventions:  - Assess and re-assess patient's lethality and potential for self-injury  - Engage patient in 1:1 interactions, daily, for a minimum of 15 minutes  - Encourage patient to express feelings, fears, frustrations, hopes  - Establish rapport/trust with patient   Outcome: Progressing  Goal: Refrain from harming self  Description: Interventions:  - Monitor patient closely, per order  - Develop a trusting relationship  - Supervise medication ingestion, monitor effects and side effects   Outcome: Progressing  Goal: Attend and participate in unit activities, including therapeutic, recreational, and educational groups  Description: Interventions:  - Provide therapeutic and educational activities daily, encourage attendance and participation, and document same in the medical record  - Obtain collateral information, encourage visitation and family involvement in care   Outcome: Progressing  Goal: Recognize maladaptive responses and adopt new coping mechanisms  Outcome: Progressing  Goal: Complete daily ADLs, including personal hygiene independently, as able  Description: Interventions:  - Observe, teach, and assist patient with ADLS  - Monitor and promote a balance of rest/activity, with adequate nutrition and elimination  Outcome: Progressing     Problem: Depression  Goal: Treatment Goal: Demonstrate behavioral control of depressive symptoms, verbalize feelings of improved mood/affect, and adopt new coping skills prior to discharge  Outcome: Progressing  Goal: Verbalize thoughts and feelings  Description: Interventions:  - Assess and re-assess patient's level of risk   - Engage patient in 1:1 interactions, daily, for a minimum of 15 minutes   - Encourage patient to express feelings, fears, frustrations, hopes   Outcome: Progressing  Goal: Refrain from harming self  Description: Interventions:  - Monitor patient closely, per order   - Supervise medication ingestion, monitor effects and side effects   Outcome: Progressing  Goal: Refrain from isolation  Description: Interventions:  - Develop a trusting relationship   - Encourage socialization   Outcome: Progressing  Goal: Refrain from self-neglect  Outcome: Progressing  Goal: Attend and participate in unit activities, including therapeutic, recreational, and educational groups  Description: Interventions:  - Provide therapeutic and educational activities daily, encourage attendance and participation, and document same in the medical record   Outcome: Progressing  Goal: Complete daily ADLs, including personal hygiene independently, as able  Description: Interventions:  - Observe, teach, and assist patient with ADLS  -  Monitor and promote a balance of rest/activity, with adequate nutrition and elimination   Outcome: Progressing     Problem: SELF HARM/SUICIDALITY  Goal: Will have no self-injury during hospital stay  Description: INTERVENTIONS:  - Q 15 MINUTES: Routine safety checks  - Q WAKING SHIFT & PRN: Assess risk to determine if routine checks are adequate to maintain patient safety  - Encourage patient to participate actively in care by formulating a plan to combat response to suicidal ideation, identify supports and resources  Outcome: Progressing     Problem: Nutrition/Hydration-ADULT  Goal: Nutrient/Hydration intake appropriate for improving, restoring or maintaining nutritional needs  Description: Monitor and assess patient's nutrition/hydration status for malnutrition  Collaborate with interdisciplinary team and initiate plan and interventions as ordered  Monitor patient's weight and dietary intake as ordered or per policy  Utilize nutrition screening tool and intervene as necessary   Determine patient's food preferences and provide high-protein, high-caloric foods as appropriate       INTERVENTIONS:  - Monitor oral intake, urinary output, labs, and treatment plans  - Assess nutrition and hydration status and recommend course of action  - Evaluate amount of meals eaten  - Assist patient with eating if necessary   - Allow adequate time for meals  - Recommend/ encourage appropriate diets, oral nutritional supplements, and vitamin/mineral supplements  - Order, calculate, and assess calorie counts as needed  - Recommend, monitor, and adjust tube feedings and TPN/PPN based on assessed needs  - Assess need for intravenous fluids  - Provide specific nutrition/hydration education as appropriate  - Include patient/family/caregiver in decisions related to nutrition  Outcome: Progressing

## 2022-02-14 LAB — RPR SER QL: NORMAL

## 2022-02-14 PROCEDURE — 99232 SBSQ HOSP IP/OBS MODERATE 35: CPT | Performed by: STUDENT IN AN ORGANIZED HEALTH CARE EDUCATION/TRAINING PROGRAM

## 2022-02-14 RX ORDER — ZIPRASIDONE HYDROCHLORIDE 40 MG/1
40 CAPSULE ORAL 2 TIMES DAILY WITH MEALS
Status: DISCONTINUED | OUTPATIENT
Start: 2022-02-14 | End: 2022-02-16 | Stop reason: HOSPADM

## 2022-02-14 RX ADMIN — ASPIRIN 81 MG CHEWABLE TABLET 81 MG: 81 TABLET CHEWABLE at 09:17

## 2022-02-14 RX ADMIN — ZIPRASIDONE HYDROCHLORIDE 40 MG: 40 CAPSULE ORAL at 16:08

## 2022-02-14 RX ADMIN — Medication 1000 UNITS: at 09:17

## 2022-02-14 RX ADMIN — ZIPRASIDONE HYDROCHLORIDE 20 MG: 20 CAPSULE ORAL at 09:17

## 2022-02-14 RX ADMIN — Medication 3 MG: at 21:35

## 2022-02-14 RX ADMIN — BUPROPION HYDROCHLORIDE 300 MG: 300 TABLET, FILM COATED, EXTENDED RELEASE ORAL at 09:17

## 2022-02-14 RX ADMIN — ATORVASTATIN CALCIUM 40 MG: 40 TABLET, FILM COATED ORAL at 16:08

## 2022-02-14 RX ADMIN — FLUOXETINE 80 MG: 20 CAPSULE ORAL at 09:17

## 2022-02-14 RX ADMIN — LISINOPRIL 20 MG: 20 TABLET ORAL at 09:17

## 2022-02-14 RX ADMIN — DIVALPROEX SODIUM 2000 MG: 250 TABLET, FILM COATED, EXTENDED RELEASE ORAL at 21:35

## 2022-02-14 NOTE — NURSING NOTE
Per last note by this writer - pt being observed from nurses station due to fluctuating ability to CFS on unit and after meeting with peer RN pt verbalized ability to CFS and is now in day room watching tv with peers

## 2022-02-14 NOTE — PROGRESS NOTES
Progress Note - Behavioral Health     Leydi Khan 47 y o  female MRN: [id number]   Unit/Bed#: [facility] 260-02 Encounter: [id number]    Behavior over the last 24 hours: unchanged  Leydi is a 46 y/o female with a history of bipolar disorder who presents for psychiatric follow up  Staff reports no issues overnight  Somewhat scant, constricted affect, otherwise calm and cooperative upon approach  Complaining of L hip pain s/p fall prior to coming to the hospital  Still feeling depressed, continues to endorse auditory hallucinations of negative commentary with occasional commands to harm self  Endorsing fleeting SI without plan, able to CFS on the unit  Med and meal compliant  No additional concerns       Sleep: normal  Appetite: normal  Medication side effects: No   ROS: all other systems are negative    Mental Status Evaluation:    Appearance:  age appropriate, casually dressed, adequate grooming   Behavior:  cooperative, calm   Speech:  normal rate and volume   Mood:  depressed   Affect:  constricted   Thought Process:  organized, linear   Associations: concrete associations   Thought Content:  no overt delusions, negative thinking, ruminating thoughts   Perceptual Disturbances: no visual hallucinations, auditory hallucinations with commands to harm self and with derogatory comments, does not appear responding to internal stimuli   Risk Potential: Suicidal ideation - Yes, fleeting suicidal thoughts, contracts for safety on the unit, would talk to staff if not feeling safe on the unit  Homicidal ideation - None  Potential for aggression - No   Sensorium:  oriented to person, place, time/date and situation   Memory:  recent and remote memory grossly intact   Consciousness:  alert and awake   Attention/Concentration: attention span and concentration appear shorter than expected for age   Insight:  fair   Judgment: fair   Gait/Station: slow gait   Motor Activity: no abnormal movements     Vital signs in last 24 hours: Temp:  [97 7 °F (36 5 °C)-99 3 °F (37 4 °C)] 99 3 °F (37 4 °C)  HR:  [84-85] 84  Resp:  [17] 17  BP: (116-146)/(66-75) 146/66    Laboratory results: I have personally reviewed all pertinent laboratory/tests results    Most Recent Labs:   Lab Results   Component Value Date    WBC 7 82 02/10/2022    RBC 4 03 02/10/2022    HGB 12 6 02/10/2022    HCT 39 5 02/10/2022     02/10/2022    RDW 14 2 02/10/2022    NEUTROABS 4 64 02/10/2022    SODIUM 136 02/10/2022    K 4 4 02/10/2022     02/10/2022    CO2 31 02/10/2022    BUN 16 02/10/2022    CREATININE 0 72 02/10/2022    GLUC 89 02/10/2022    CALCIUM 9 0 02/10/2022    AST 15 02/10/2022    ALT 25 02/10/2022    ALKPHOS 65 02/10/2022    TP 7 3 02/10/2022    ALB 3 6 02/10/2022    TBILI 0 60 02/10/2022    CHOLESTEROL 160 02/13/2022    HDL 38 (L) 02/13/2022    TRIG 157 (H) 02/13/2022    [address] 91 02/13/2022    [place] 122 02/13/2022    VALPROICTOT 19 (L) 01/06/2022    TQF9NCSCLHXV 1 585 02/13/2022    PREGUR Negative 02/10/2022    PREGSERUM Negative 12/04/2021    RPR Non-Reactive 02/13/2022    HGBA1C 4 8 12/31/2021    EAG 91 12/31/2021       Progress Toward Goals: progressing    Assessment/Plan   Principal Problem:    Bipolar affective disorder, depressed, severe, with psychotic behavior ([facility] [address] )  Active Problems:    Medical clearance for psychiatric admission    Primary hypertension    Hyperlipidemia    Class 3 severe obesity due to excess calories without serious comorbidity in [facility])    PTSD (post-traumatic stress disorder)    Borderline personality disorder ([facility] [address] )      Recommended Treatment:     Planned medication and treatment changes: All current active medications have been reviewed  Encourage group therapy, milieu therapy and occupational therapy  Behavioral Health checks every 7 minutes    Increase Geodon to 40mg BID for mood and psychotic symptoms      Current Facility-Administered Medications   Medication Dose Route Frequency Provider Last Rate  acetaminophen  650 mg Oral Q6H PRN Jane Sales, MD      acetaminophen  650 mg Oral Q4H PRN Jane Sales, MD      acetaminophen  975 mg Oral Q6H PRN Jane Sales, MD      aluminum-magnesium hydroxide-simethicone  30 mL Oral Q4H PRN Jane Sales, MD      artificial tear  1 application Both Eyes L4C PRN Jane Sales, MD      aspirin  81 mg Oral Daily Chloé Birch, MD      atorvastatin  40 mg Oral Daily With Daniel Lerma MD      benztropine  1 mg Oral Q4H PRN Max 6/day Jane Sales, MD      bisacodyl  10 mg Rectal Daily PRN Jane Sales, MD      buPROPion  300 mg Oral Daily Chloé Birch, MD      cholecalciferol  1,000 Units Oral Daily Anscaritom Eyal, MD      hydrOXYzine HCL  50 mg Oral Q6H PRN Max 4/day Jane Sales, MD      Or    diphenhydrAMINE  50 mg Intramuscular Q6H PRN Jane Sales, MD      divalproex sodium  2,000 mg Oral HS Jerodelm Eyal, MD      FLUoxetine  80 mg Oral Daily Teom Eyal, MD      hydrOXYzine HCL  100 mg Oral Q6H PRN Max 4/day Jane Sales, MD      Or    LORazepam  2 mg Intramuscular Q6H PRN Jane Sales, MD      hydrOXYzine HCL  25 mg Oral Q6H PRN Max 4/day Jane Sales, MD      lisinopril  20 mg Oral Daily Chloé Birch, MD      melatonin  3 mg Oral HS Jane Sales, MD      nicotine  1 patch Transdermal Daily Jane Sales, MD      OLANZapine  5 mg Oral Q4H PRN Max 3/day Jane Sales, MD      Or   Umesh Hwang OLANZapine  2 5 mg Intramuscular Q4H PRN Max 3/day Jane Sales, MD      OLANZapine  5 mg Oral Q3H PRN Max 3/day Jane Sales, MD      Or    OLANZapine  5 mg Intramuscular Q3H PRN Max 3/day Jane Sales, MD      OLANZapine  2 5 mg Oral Q4H PRN Max 6/day Jane Sales, MD      polyethylene glycol  17 g Oral Daily PRN Jane Sales, MD      senna-docusate sodium  1 tablet Oral Daily PRN Salena Kinney MD      traZODone  50 mg Oral HS PRN Salena Kinney MD      ziprasidone  20 mg Oral BID With Meals Anna Marie Vargas MD       Risks / Benefits of Treatment:    Risks, benefits, and possible side effects of medications explained to patient and patient verbalizes understanding and agreement for treatment  Counseling / Coordination of Care:    Patient's progress discussed with staff in treatment team meeting  Medications, treatment progress and treatment plan reviewed with patient      Kirill Britt PA-C 02/14/22

## 2022-02-14 NOTE — PLAN OF CARE
Problem: Alteration in Thoughts and Perception  Goal: Treatment Goal: Gain control of psychotic behaviors/thinking, reduce/eliminate presenting symptoms and demonstrate improved reality functioning upon discharge  Outcome: Progressing     Problem: Risk for Self Injury/Neglect  Goal: Treatment Goal: Remain safe during length of stay, learn and adopt new coping skills, and be free of self-injurious ideation, impulses and acts at the time of discharge  Outcome: Progressing     Problem: Depression  Goal: Treatment Goal: Demonstrate behavioral control of depressive symptoms, verbalize feelings of improved mood/affect, and adopt new coping skills prior to discharge  Outcome: Progressing     Problem: SELF HARM/SUICIDALITY  Goal: Will have no self-injury during hospital stay  Description: INTERVENTIONS:  - Q 15 MINUTES: Routine safety checks  - Q WAKING SHIFT & PRN: Assess risk to determine if routine checks are adequate to maintain patient safety  - Encourage patient to participate actively in care by formulating a plan to combat response to suicidal ideation, identify supports and resources  Outcome: Progressing     Problem: Nutrition/Hydration-ADULT  Goal: Nutrient/Hydration intake appropriate for improving, restoring or maintaining nutritional needs  Description: Monitor and assess patient's nutrition/hydration status for malnutrition  Collaborate with interdisciplinary team and initiate plan and interventions as ordered  Monitor patient's weight and dietary intake as ordered or per policy  Utilize nutrition screening tool and intervene as necessary  Determine patient's food preferences and provide high-protein, high-caloric foods as appropriate       INTERVENTIONS:  - Monitor oral intake, urinary output, labs, and treatment plans  - Assess nutrition and hydration status and recommend course of action  - Evaluate amount of meals eaten  - Assist patient with eating if necessary   - Allow adequate time for meals  - Recommend/ encourage appropriate diets, oral nutritional supplements, and vitamin/mineral supplements  - Order, calculate, and assess calorie counts as needed  - Recommend, monitor, and adjust tube feedings and TPN/PPN based on assessed needs  - Assess need for intravenous fluids  - Provide specific nutrition/hydration education as appropriate  - Include patient/family/caregiver in decisions related to nutrition  Outcome: Progressing

## 2022-02-14 NOTE — NURSING NOTE
Patient reading quietly in room, upon approach from writer to check in on pt this afternoon, she reports "im doing good" then moments later, "oh I am hearing those voices again actually and they want me to hurt myself, I just don't know if I can avoid listening to them "  Writer encouraged her to continue reading her book to cope, however pt stood up and started walking with writer to sit in front of nurses station, knowing the expectation that writer was going to state without being prompted yet  Pt is now presently sitting in chair by nurses station where she can be seen and is not disclosing a plan of how she'd harm self, just reporting increased AH suddenly after writer checked on her and has been encouraged to try a PRN to help with AH

## 2022-02-14 NOTE — NURSING NOTE
Patient is pleasant, social and bright within milieu today  Denies SI/HI/AVH  Attends groups today, per her stated goal   Compliant with meds  Pt showing off her pedicure from pta, and reporting that she is happy not to hear voices today, however "still have depression though" and writer validated this but reminded pt to speak up if support is needed and to use coping skills  Pt reported plans to shower this evening and was given towels and change of clothes

## 2022-02-15 PROBLEM — Z00.8 MEDICAL CLEARANCE FOR PSYCHIATRIC ADMISSION: Status: RESOLVED | Noted: 2021-01-09 | Resolved: 2022-02-15

## 2022-02-15 PROCEDURE — 99232 SBSQ HOSP IP/OBS MODERATE 35: CPT | Performed by: PHYSICIAN ASSISTANT

## 2022-02-15 PROCEDURE — 80164 ASSAY DIPROPYLACETIC ACD TOT: CPT | Performed by: PHYSICIAN ASSISTANT

## 2022-02-15 PROCEDURE — 0241U HB NFCT DS VIR RESP RNA 4 TRGT: CPT | Performed by: PHYSICIAN ASSISTANT

## 2022-02-15 RX ORDER — ATORVASTATIN CALCIUM 40 MG/1
40 TABLET, FILM COATED ORAL
Qty: 30 TABLET | Refills: 0 | Status: ON HOLD | OUTPATIENT
Start: 2022-02-15 | End: 2022-04-16

## 2022-02-15 RX ORDER — BUPROPION HYDROCHLORIDE 300 MG/1
300 TABLET ORAL DAILY
Qty: 30 TABLET | Refills: 1 | Status: SHIPPED | OUTPATIENT
Start: 2022-02-15 | End: 2022-04-19 | Stop reason: HOSPADM

## 2022-02-15 RX ORDER — ZIPRASIDONE HYDROCHLORIDE 40 MG/1
40 CAPSULE ORAL 2 TIMES DAILY WITH MEALS
Qty: 60 CAPSULE | Refills: 1 | Status: SHIPPED | OUTPATIENT
Start: 2022-02-15 | End: 2022-05-09 | Stop reason: HOSPADM

## 2022-02-15 RX ORDER — MELATONIN
1000 DAILY
Qty: 30 TABLET | Refills: 0 | Status: ON HOLD | OUTPATIENT
Start: 2022-02-15 | End: 2022-04-16

## 2022-02-15 RX ORDER — ASPIRIN 81 MG/1
81 TABLET, CHEWABLE ORAL DAILY
Qty: 30 TABLET | Refills: 1 | Status: ON HOLD | OUTPATIENT
Start: 2022-02-16 | End: 2022-04-16 | Stop reason: SDUPTHER

## 2022-02-15 RX ORDER — FLUOXETINE HYDROCHLORIDE 40 MG/1
80 CAPSULE ORAL DAILY
Qty: 60 CAPSULE | Refills: 1 | Status: SHIPPED | OUTPATIENT
Start: 2022-02-16 | End: 2022-05-09 | Stop reason: HOSPADM

## 2022-02-15 RX ORDER — LANOLIN ALCOHOL/MO/W.PET/CERES
3 CREAM (GRAM) TOPICAL
Qty: 30 TABLET | Refills: 1 | Status: ON HOLD | OUTPATIENT
Start: 2022-02-15 | End: 2022-04-16 | Stop reason: SDUPTHER

## 2022-02-15 RX ORDER — DIVALPROEX SODIUM 500 MG/1
2000 TABLET, EXTENDED RELEASE ORAL
Qty: 120 TABLET | Refills: 1 | Status: SHIPPED | OUTPATIENT
Start: 2022-02-15 | End: 2022-05-09 | Stop reason: HOSPADM

## 2022-02-15 RX ORDER — LISINOPRIL 20 MG/1
20 TABLET ORAL DAILY
Qty: 30 TABLET | Refills: 0 | Status: ON HOLD | OUTPATIENT
Start: 2022-02-15 | End: 2022-04-16

## 2022-02-15 RX ADMIN — BUPROPION HYDROCHLORIDE 300 MG: 300 TABLET, FILM COATED, EXTENDED RELEASE ORAL at 09:13

## 2022-02-15 RX ADMIN — ZIPRASIDONE HYDROCHLORIDE 40 MG: 40 CAPSULE ORAL at 16:53

## 2022-02-15 RX ADMIN — FLUOXETINE 80 MG: 20 CAPSULE ORAL at 09:14

## 2022-02-15 RX ADMIN — ATORVASTATIN CALCIUM 40 MG: 40 TABLET, FILM COATED ORAL at 16:53

## 2022-02-15 RX ADMIN — Medication 1000 UNITS: at 09:14

## 2022-02-15 RX ADMIN — ZIPRASIDONE HYDROCHLORIDE 40 MG: 40 CAPSULE ORAL at 09:13

## 2022-02-15 RX ADMIN — LISINOPRIL 20 MG: 20 TABLET ORAL at 09:13

## 2022-02-15 RX ADMIN — DIVALPROEX SODIUM 2000 MG: 250 TABLET, FILM COATED, EXTENDED RELEASE ORAL at 21:39

## 2022-02-15 RX ADMIN — ASPIRIN 81 MG CHEWABLE TABLET 81 MG: 81 TABLET CHEWABLE at 09:13

## 2022-02-15 NOTE — DISCHARGE INSTR - APPOINTMENTS
Elizabeth Magallanes RN, our [employer] [employer], will be calling you after your discharge, on the phone number that you provided  She will be available as an additional support, if needed  If you wish to speak with her, you may contact [facility] at [phone]

## 2022-02-15 NOTE — PROGRESS NOTES
Progress Note - Behavioral Health     Leydi Khan 47 y o  female MRN: [id number]   Unit/Bed#: [facility] 260-02 Encounter: [id number]    Behavior over the last 24 hours: unchanged  Leydi is a 81-year-old female with a history of bipolar disorder who presents for psychiatric follow-up  Staff reports no issues overnight, though she reportedly was experiencing suicidal thoughts with an inability to contract for safety briefly yesterday evening  She brought a chair in front of the nurse station and, shortly thereafter, was no longer feeling suicidal and went back to her room without any issues  Today, she is calm and cooperative upon approach but continues to endorse lateral left hip pain without radiation s/p mechanical fall 2 months ago, worse with position change and with ambulation, minimally relieved with hot packs and Tylenol  She continues to endorse feeling depressed with fleeting SI though no concrete plan and no urge to self-harm  Continues to endorse AH of negative commentary but does not appear internally preoccupied and does not elaborate on this matter  Feels a little bit more tired after her Geodon increase but otherwise tolerating well without any issues  Medication and meal compliant      Sleep: normal  Appetite: normal  Medication side effects: Yes - mild sedation   ROS: reports hip pain, all other systems are negative    Mental Status Evaluation:    Appearance:  age appropriate, casually dressed, adequate grooming   Behavior:  cooperative, calm   Speech:  normal rate and volume   Mood:  depressed   Affect:  reactive, slightly brighter, mood incongruent   Thought Process:  organized, goal directed, linear   Associations: concrete associations   Thought Content:  no overt delusions, somatic preoccupation, negative thinking   Perceptual Disturbances: no visual hallucinations, does not appear responding to internal stimuli, auditory hallucinations of Negative commentary without commands   Risk Potential: Suicidal ideation - Yes, fleeting suicidal thoughts, contracts for safety on the unit  Homicidal ideation - None at present  Potential for aggression - No   Sensorium:  oriented to person, place and time/date   Memory:  recent and remote memory grossly intact   Consciousness:  alert and awake   Attention/Concentration: attention span and concentration appear shorter than expected for age   Insight:  fair   Judgment: fair   Gait/Station: normal gait/station   Motor Activity: no abnormal movements     Vital signs in last 24 hours:    Temp:  [98 2 °F (36 8 °C)-98 8 °F (37 1 °C)] 98 8 °F (37 1 °C)  HR:  [78-81] 78  Resp:  [16-17] 16  BP: (115-143)/(56-79) 143/79    Laboratory results: I have personally reviewed all pertinent laboratory/tests results    Most Recent Labs:   Lab Results   Component Value Date    WBC 7 82 02/10/2022    RBC 4 03 02/10/2022    HGB 12 6 02/10/2022    HCT 39 5 02/10/2022     02/10/2022    RDW 14 2 02/10/2022    NEUTROABS 4 64 02/10/2022    SODIUM 136 02/10/2022    K 4 4 02/10/2022     02/10/2022    CO2 31 02/10/2022    BUN 16 02/10/2022    CREATININE 0 72 02/10/2022    GLUC 89 02/10/2022    CALCIUM 9 0 02/10/2022    AST 15 02/10/2022    ALT 25 02/10/2022    ALKPHOS 65 02/10/2022    TP 7 3 02/10/2022    ALB 3 6 02/10/2022    TBILI 0 60 02/10/2022    CHOLESTEROL 160 02/13/2022    HDL 38 (L) 02/13/2022    TRIG 157 (H) 02/13/2022    [address] 91 02/13/2022    [place] 122 02/13/2022    VALPROICTOT 19 (L) 01/06/2022    XTS3TIWKQANX 1 585 02/13/2022    PREGUR Negative 02/10/2022    PREGSERUM Negative 12/04/2021    RPR Non-Reactive 02/13/2022    HGBA1C 4 8 12/31/2021    EAG 91 12/31/2021       Progress Toward Goals: progressing    Assessment/Plan   Principal Problem:    Bipolar affective disorder, depressed, severe, with psychotic behavior ([facility] [address] )  Active Problems:    Medical clearance for psychiatric admission    Primary hypertension    Hyperlipidemia    Class 3 severe obesity due to excess calories without serious comorbidity in adult [facility])    PTSD (post-traumatic stress disorder)    Borderline personality disorder ([facility] [address] )      Recommended Treatment:     Planned medication and treatment changes: All current active medications have been reviewed  Encourage group therapy, milieu therapy and occupational therapy  Behavioral Health checks every 7 minutes    Continue current medications  Discharge planning back to [facility] for tomorrow  Case management helping to coordinate  Suspect patient may attempt to sabotage her discharge as she does not wish to return to her group home  She has a history of doing this after she gets swabbed for COVID because she recognizes that she will be returning to the group home  Will alert staff to possible worsening of behaviors after COVID swab      Current Facility-Administered Medications   Medication Dose Route Frequency Provider Last Rate    acetaminophen  650 mg Oral Q6H PRN Leda Baez MD      acetaminophen  650 mg Oral Q4H PRN Leda Baez MD      acetaminophen  975 mg Oral Q6H PRN Leda Baez MD      aluminum-magnesium hydroxide-simethicone  30 mL Oral Q4H PRN Leda Baez MD      artificial tear  1 application Both Eyes F1A PRN Leda Baez MD      aspirin  81 mg Oral Daily Hussein Toth MD      atorvastatin  40 mg Oral Daily With Caty Morillo MD      benztropine  1 mg Oral Q4H PRN Max 6/day Leda Baez MD      bisacodyl  10 mg Rectal Daily PRN Leda Baez MD      buPROPion  300 mg Oral Daily Hussein Toth MD      cholecalciferol  1,000 Units Oral Daily Hussein Toth MD      hydrOXYzine HCL  50 mg Oral Q6H PRN Max 4/day Leda Baez MD      Or    diphenhydrAMINE  50 mg Intramuscular Q6H PRN Leda Baez MD      divalproex sodium  2,000 mg Oral HS Hussein Toth MD      FLUoxetine  80 mg Oral Daily Hussein Toth MD      hydrOXYzine HCL  100 mg Oral Q6H PRN Max 4/day Leda Baez MD      Or    LORazepam  2 mg Intramuscular Q6H PRN Leda Baez MD      hydrOXYzine HCL  25 mg Oral Q6H PRN Max 4/day Leda Baez MD      lisinopril  20 mg Oral Daily Hussein Toth MD      melatonin  3 mg Oral HS Leda Baez MD      nicotine  1 patch Transdermal Daily Leda Baez MD      OLANZapine  5 mg Oral Q4H PRN Max 3/day Leda Baez MD      Or    OLANZapine  2 5 mg Intramuscular Q4H PRN Max 3/day Leda Baez MD      OLANZapine  5 mg Oral Q3H PRN Max 3/day Leda Baez MD      Or    OLANZapine  5 mg Intramuscular Q3H PRN Max 3/day Leda Baez MD      OLANZapine  2 5 mg Oral Q4H PRN Max 6/day Leda Baez MD      polyethylene glycol  17 g Oral Daily PRN Leda Baez MD      senna-docusate sodium  1 tablet Oral Daily PRN Leda Baez MD      traZODone  50 mg Oral HS PRN Leda Baez MD      ziprasidone  40 mg Oral BID With Meals Ashlee Nava PA-C       Risks / Benefits of Treatment:    Risks, benefits, and possible side effects of medications explained to patient and patient verbalizes understanding and agreement for treatment  Counseling / Coordination of Care:    Patient's progress discussed with staff in treatment team meeting  Medications, treatment progress and treatment plan reviewed with patient      Ashlee Nava PA-C 02/15/22

## 2022-02-15 NOTE — CASE MANAGEMENT
CM contacted Pt's residence, [facility] @ [phone] ext [phone] & left a message for Pt's care coordinator, Sanam, regarding plans for d/c tomorrow for Pt  CM contacted alternate care coordinator, Carli Tilley at ext  [phone] & confirmed d/c plans for tomorrow  He agreed staff will provide transportation between 10/11 AM   CM confirmed COVID testing will be completed & Pt's prescriptions sent to [facility]

## 2022-02-15 NOTE — DISCHARGE INSTR - OTHER ORDERS
[facility] is a confidential 7 days/week telephone support service manned by trained mental health consumers  [employer] operates daily but is not able to accept calls between 2AM-6AM    [facility] provides support, a listening ear and can provide information about available services  [facility] specializes in the concerns of mental health consumers, their families and friends  However, we are also here for anyone who has a mental health concern, is confused about or just doesn't know anything about mental health or where to get information  To reach Joseph Strickland, call [phone] accepts calls between 6:00 AM to 10:00 AM and from 4:00 PM to 12:00 AM    Text CONNECT to [phone] from anywhere in the [place], anytime, about any type of crisis  A live, trained Crisis Counselor receives the text and lets you know that they are here to listen  The volunteer Crisis Counselor will help you move from a hot moment to a cool moment  [facility] ([facility]) AT [place] [facility] - licensed telephone and mobile crisis services that provide mental health assessments to all age groups regardless of income or insurance  Cris[facility] Intervention operates 24-hour/7 days a week  [address] [place] [address] assists consumer who are experiencing a mental health emergency and lack the resources to assist themselves  Immediate intervention for suicidal and depressed individuals with home visits/outreach being top priority  Crisis can be contacted at [phone]  The [facility]) offers various education & support groups for you & your family  For more information visit their website at   http://www [employer]uli/   Dial 2-1-1 to get connected/get help  Free, confidential information & referral available 24/7: Aging Services, Child & Youth Services, Counseling, Education/Training, Food/Shelter/Clothing, Health Services, Parenting, Substance Abuse, Support Groups, Volunteer Opportunities, & much more    Phone: 2-1-1 or [phone], Web: MARILOU GL222LYKH CHLOE, Email: [email or web address]    [facility]  The [facility] ([address]) offers persons with a mental illness a safe, healing environment to explore their personal and vocational potential and receive support in achieving their goals  Instead of traditional therapy, the work of the house is the rehabilitation  As members contribute meaningful work to the house, they build confidence and a sense of purpose  Be a Member - It's Free  Membership in the [facility] is open to any [facility] resident who is 25 or older and has a history of mental illness  Membership is free for life  [place] [address], [address]  Hours: Monday - Friday: 8 a m  - 4 p m  With varying hours on holidays   ([place] [address] [place] [address] ([address]) provides a stress-free atmosphere for persons 18 and older who have experienced mental health issues  What The [address]  [place]  Holiday gatherings  Recovery  Employment  Education  Community Resources  Empowerment  Helps participants achieve their goals  Enhances quality of life  Encourages leadership    The [address]  Hours: Monday through Friday: 4 p m  - 9 p m  Saturday: 2 p m  - 8 p m    Closed Sundays  Varying hours on holidays            Contact [phone]

## 2022-02-15 NOTE — PLAN OF CARE
D/C is planned for tomorrow with Pt returning to her [facility] & continuing outpatient at [facility]

## 2022-02-15 NOTE — NURSING NOTE
Seclusive to room, lying in bed  Refused to eat breakfast and states her left hip hurts too much for her to eat  Provided heat pack  Reports poor sleep d/t hip pain  Denies SI/HI and hallucinations  Depression remains unchanged from admission per patient  States her boyfriend is home now, he was recently inpatient  Polite in conversation  No additional concerns

## 2022-02-15 NOTE — PROGRESS NOTES
Status: No changes to report  Last evening Pt reported she couldn't contract for safety & on her own, went & got a chair & placed it by the nurses station  Pt sat for about 5 minutes, & then informed staff she could contact, put the chair back, & returned to the day room with her peers  Pt slept overnight without issue  Medication: Geodon increased to 40mg 2 times daily with meals / no PRNs  D/C: Weds / Pt will need COVID testing done & prescriptions sent to [facility] today  02/15/22 0750   Team Meeting   Meeting Type Daily Rounds   Team Members Present   Team Members Present Physician;Nurse; Other (Discipline and Name);    Physician Team Member Dr Karen Clifford / Marilyn Latif / Kvng Hunter Team Member Maico Morgan / Nieves Pop Management Team Member Morena Bennett / Christina Webber   Other (Discipline and Name) Dalphine Knife (art therapy)   Patient/Family Present   Patient Present No   Patient's Family Present No

## 2022-02-15 NOTE — DISCHARGE SUMMARY
Discharge Summary - [facility] [facility] 47 y o  female MRN: [id number]  Unit/Bed#: HUSSEIN Palmer 490-04 Encounter: [id number]     Admission Date: 2/10/2022         Discharge Date: 2/16/22    Attending Psychiatrist: Abigail Rodgers*    Reason for Admission/HPI:     Per initial H&P from Dr Derwood Eisenmenger on 2/11/22:    "Per Crisis worker on 2/10: "Crisis completed safety and risk assessment with patient in [facility]  Patient reports that she was in the hospital about a month ago at [address]  Patient reports that she was doing well until a few days ago and began to hear voices again  The voices were telling her she is fat and ugly  Also experiencing worsening depression, sadness and anxiety  Having thoughts people are out to get her and are against her  Also feels that no one likes her  Patient cannot identify any stressors or triggers for her recent worsening of symptoms  Patient lives at [facility] and has been there for about 6 months  Patient endorses SI and self harm  Stated that she attempted to kill herself using a thumb tack on her arm  Patient does have superficial scratches on her forearm  Patient denies HI and does not have any visual hallucinations  Patient willing to sign 201  Provided patient with her rights "     This is 48 yo female with hx of Schizoaffective disorder/PTSd/anxiety and personality diorder residing at group home admitted to inpatient unit on voluntary status for worsening of voices, mood, self harming behavior and suicidal ideations  Patient appears unkempt, malodorous, depressed, scant with poor eye contact  She endorses depressed mood, negative voices, racing thoughts, suicidal ideation and passive death wishes   Denies any intent or plan currently "      Social History     Tobacco History     Smoking Status  Never Smoker    Smokeless Tobacco Use  Never Used    Tobacco Comment  Pt stated "smokes when stressed"          Alcohol History     Alcohol Use Status  Not Currently          Drug Use     Drug Use Status  Not Currently          Sexual Activity     Sexually Active  Yes Partners  Male          Activities of Daily Living    Not Asked               Additional Substance Use Detail     Questions Responses    Problems Due to Past Use of Alcohol? No    Problems Due to Past Use of Substances?  No    Substance Use Assessment Denies substance use within the past 12 months    Alcohol Use Frequency Denies use in past 12 months    Cannabis frequency Never used    Comment: Never used on 1/9/2021     Heroin Frequency Denies use in past 12 months    Cocaine frequency Never used    Comment: Never used on 1/9/2021     Crack Cocaine Frequency Denies use in past 12 months    Methamphetamine Frequency Denies use in past 12 months    Narcotic Frequency Denies use in past 12 months    Benzodiazepine Frequency Denies use in past 12 months    Amphetamine frequency Denies use in past 12 months    Barbituate Frequency Denies use use in past 12 months    Inhalant frequency Never used    Comment: Never used on 1/9/2021     Hallucinogen frequency Never used    Comment: Never used on 1/9/2021     Ecstasy frequency Never used    Comment: Never used on 1/9/2021     Other drug frequency Never used    Comment: Never used on 1/9/2021     Opiate frequency Denies use in past 12 months    Last reviewed by Tere Haro RN on 2/11/2022          Past Medical History:   Diagnosis Date    Anxiety     [facility]     Bipolar affective disorder, depressed, severe ([facility] ) 10/10/2021    Borderline personality disorder ([facility] )     CAD (coronary artery disease)     Cognitive impairment     Depression     Dyslipidemia     GERD (gastroesophageal reflux disease)     Hypertension     Obesity     Psychiatric disorder     PTSD (post-traumatic stress disorder)     Schizoaffective disorder ([facility] )     Seizure ([facility] )      Past Surgical History:   Procedure Laterality Date    APPENDECTOMY PATIENT DENIES HAVING APPENDIX REMOVED    CHOLECYSTECTOMY      FOOT SURGERY Right        Medications: All current active medications have been reviewed  Medications prior to admission:    Prior to Admission Medications   Prescriptions Last Dose Informant Patient Reported? Taking? Diclofenac Sodium (VOLTAREN) 1 %   No No   Sig: Apply 2 g topically 4 (four) times a day   FLUoxetine (PROzac) 40 MG capsule   No No   Sig: Take 2 capsules (80 mg total) by mouth every evening   aspirin (ECOTRIN LOW STRENGTH) 81 mg EC tablet   No No   Sig: Take 1 tablet (81 mg total) by mouth daily   atorvastatin (LIPITOR) 40 mg tablet   No No   Sig: Take 1 tablet (40 mg total) by mouth daily with dinner   buPROPion (WELLBUTRIN XL) 300 mg 24 hr tablet   No No   Sig: Take 1 tablet (300 mg total) by mouth daily   cholecalciferol (VITAMIN D3) 1,000 units tablet   No No   Sig: Take 1 tablet (1,000 Units total) by mouth daily   divalproex sodium (DEPAKOTE ER) 500 mg 24 hr tablet   No No   Sig: Take 4 tablets (2,000 mg total) by mouth daily   lisinopril (ZESTRIL) 20 mg tablet   No No   Sig: Take 1 tablet (20 mg total) by mouth daily   melatonin 3 mg   No No   Sig: Take 1 tablet (3 mg total) by mouth daily at bedtime   nystatin (MYCOSTATIN) cream   No No   Sig: Apply topically 2 (two) times a day   polyethylene glycol (MIRALAX) 17 g packet   No No   Sig: Take 17 g by mouth daily   prazosin (MINIPRESS) 2 mg capsule   No No   Sig: Take 1 capsule (2 mg total) by mouth daily at bedtime      Facility-Administered Medications: None       Allergies:      Allergies   Allergen Reactions    Fish Oil - Food Allergy Other (See Comments)     unknown    Fish-Derived Products - Food Allergy Hives       Objective     Vital signs in last 24 hours:    Temp:  [98 2 °F (36 8 °C)-98 8 °F (37 1 °C)] 98 8 °F (37 1 °C)  HR:  [78-81] 78  Resp:  [16-17] 16  BP: (115-143)/(56-79) 143/79    No intake or output data in the 24 hours ending 02/15/22 [facility] Se:     Leydi was admitted to the inpatient psychiatric unit and started on Behavioral Health checks every 7 minutes  During the hospitalization she was encouraged to attend individual therapy, group therapy, milieu therapy and occupational therapy  Psychiatric medications were adjusted over the hospital stay  To address depressive symptoms, self-abusive behavior and psychotic symptoms, Leydi was treated with antidepressant Wellbutrin XL, mood stabilizer Depakote ER and antipsychotic medication Geodon and Prolixin  Medication doses were adjusted during the hospital course  Wellbutrin XL was continued at 300mg daily  Depakote ER was continued at 2,000mg qhs  On that dose of Depakote, the VPA level was clinically therapeutic at 66 on 2/15/22  Prolixin was gradually tapered off in favor of Geodon for control of psychotic symptoms  Geodon was added and titrated to 40mg BID with improvement in auditory hallucinations  Prior to beginning of treatment medications risks and benefits and possible side effects including risk of liver impairment related to treatment with Depakote, risk of parkinsonian symptoms, Tardive Dyskinesia and metabolic syndrome related to treatment with antipsychotic medications and risk of suicidality and serotonin syndrome related to treatment with antidepressants were reviewed with Leydi  She verbalized understanding and agreement for treatment  Upon admission Leydi was seen by medical service for medical clearance for inpatient treatment and medical follow up  Leydi's symptoms gradually improved over the hospital course  Initially after admission she was still feeling depressed  With adjustment of medications and therapeutic milieu her symptoms gradually improved  At the end of treatment Leydi was more stable  Her mood was improved at the time of discharge   Leydi denied suicidal ideation, intent or plan at the time of discharge and denied homicidal ideation, intent or plan at the time of discharge  There was no overt psychosis at the time of discharge  Auditory hallucinations were significantly improved and not command in nature  Leydi was participating appropriately in milieu at the time of discharge  Behavior was appropriate on the unit at the time of discharge  Sleep and appetite were improved  Leydi was tolerating medications and was not reporting any significant side effects at the time of discharge  We felt that Leydi achieved the maximum benefit of inpatient stay at that point, was at baseline at the end of the hospitalization and could now be discharged to a lower level of care setting  Leydi also felt stable and ready for discharge at the end of the hospital stay      Mental Status at Time of Discharge:     Appearance:  age appropriate, casually dressed, adequate grooming   Behavior:  pleasant, cooperative, calm   Speech:  normal rate and volume   Mood:  improved   Affect:  appropriate   Thought Process:  organized   Associations: concrete associations   Thought Content:  no overt delusions   Perceptual Disturbances: no visual hallucinations, does not appear responding to internal stimuli, auditory hallucinations still present, but less frequent, able to ignore them, chronic, no commands   Risk Potential: Suicidal ideation - None at present, contracts for safety on the unit  Homicidal ideation - None at present  Potential for aggression - No   Sensorium:  oriented to person, place and time/date   Memory:  recent and remote memory grossly intact   Consciousness:  alert and awake   Attention/Concentration: attention span and concentration appear shorter than expected for age   Insight:  fair   Judgment: fair   Gait/Station: normal gait/station   Motor Activity: no abnormal movements       Admission Diagnosis:    Principal Problem:    Bipolar affective disorder, depressed, severe, with psychotic behavior ([facility] )  Active Problems:    Primary hypertension    Hyperlipidemia    Class 3 severe obesity due to excess calories without serious comorbidity in adult [facility])    PTSD (post-traumatic stress disorder)    Borderline personality disorder [facility])      Discharge Diagnosis:     Principal Problem:    Bipolar affective disorder, depressed, severe, with psychotic behavior ([facility] )  Active Problems:    Primary hypertension    Hyperlipidemia    Class 3 severe obesity due to excess calories without serious comorbidity in adult [facility])    PTSD (post-traumatic stress disorder)    Borderline personality disorder ([facility] )  Resolved Problems:    Medical clearance for psychiatric admission      Lab Results:   I have personally reviewed all pertinent laboratory/tests results  Most Recent Labs:   Lab Results   Component Value Date    WBC 7 82 02/10/2022    RBC 4 03 02/10/2022    HGB 12 6 02/10/2022    HCT 39 5 02/10/2022     02/10/2022    RDW 14 2 02/10/2022    NEUTROABS 4 64 02/10/2022    SODIUM 136 02/10/2022    K 4 4 02/10/2022     02/10/2022    CO2 31 02/10/2022    BUN 16 02/10/2022    CREATININE 0 72 02/10/2022    GLUC 89 02/10/2022    GLUF 80 12/31/2021    CALCIUM 9 0 02/10/2022    AST 15 02/10/2022    ALT 25 02/10/2022    ALKPHOS 65 02/10/2022    TP 7 3 02/10/2022    ALB 3 6 02/10/2022    TBILI 0 60 02/10/2022    CHOLESTEROL 160 02/13/2022    HDL 38 (L) 02/13/2022    TRIG 157 (H) 02/13/2022    LDLCALC 91 02/13/2022    NONHDLC 122 02/13/2022    VALPROICTOT 19 (L) 01/06/2022    AYE5ZNUFMVLP 1 585 02/13/2022    PREGUR Negative 02/10/2022    PREGSERUM Negative 12/04/2021    RPR Non-Reactive 02/13/2022    HGBA1C 4 8 12/31/2021    EAG 91 12/31/2021       Discharge Medications:    See after visit summary for all reconciled discharge medications provided to patient and family  Discharge instructions/Information to patient and family:     See after visit summary for information provided to patient and family        Provisions for Follow-Up Care:    See after visit summary for information related to follow-up care and any pertinent home health orders  Discharge Statement:    I spent 35 minutes discharging the patient  This time was spent on the day of discharge  I had direct contact with the patient on the day of discharge  Additional documentation is required if more than 30 minutes were spent on discharge:    I reviewed with Leydi importance of compliance with medications and outpatient treatment after discharge  I discussed the medication regimen and possible side effects of the medications with Leydi prior to discharge  At the time of discharge she was tolerating psychiatric medications  I discussed outpatient follow up with Leydi  I reviewed with Leydi crisis plan and safety plan upon discharge      Discharge on Two Antipsychotic Medications: Aisha Becerril PA-C 02/15/22

## 2022-02-15 NOTE — CASE MANAGEMENT
ROES met with Pt, who was in her room, reading a book  Pt stating that she was feeling well & confirmed she did talk with her therapist, Juice oneill  Pt had no questions for ROSE at this time

## 2022-02-15 NOTE — TREATMENT TEAM
02/15/22 1000   Activity/Group Checklist   Group Community meeting  (coping skill and mental health questions, goals)   Attendance Attended   Attendance Duration (min) 16-30   Interactions Interacted appropriately   Affect/Mood Appropriate;Calm   Goals Achieved Identified feelings; Able to listen to others; Able to engage in interactions     Patient fully participated in community meeting  She positively, and appropriately answered questions sporadically throughout the session  She complained of hip pain, due to a fall a few months ago, but stated she was okay and didn't need anything for it

## 2022-02-15 NOTE — CASE MANAGEMENT
ROSE contacted [place] @ [phone] & spoke with Raymond Phillips  Pt was scheduled to talk to Dr Ahsan Austin today, however, appointment was able to be changed to 2/22 at 11:40 AM(PHONE)  ROSE spoke with Pt's therapist, Ki Becerra, who reviewed she had been doing a lot of redirecting with Pt  She said that often Pt will call her before she calls 9-1-1, however, on the day of admission Ki Becerra said that she didn't get to call her back right away & when she did, she had called 9-1-1, only five minutes ago  She said that Pt has called her a few times throughout her admission & Pt identified her goal is to stay out longer than 5 weeks    She said that they did have discussion about housing & Pt wants an apartment, but has to again demonstrate she can stay out of the hospital   Ki Becerra said that she will see Pt tomorrow at 2 PM

## 2022-02-15 NOTE — TREATMENT TEAM
02/15/22 1300   Activity/Group Checklist   Group Life Skills  (Art Therapy)   Attendance Attended   Attendance Duration (min) 31-45   Interactions Interacted appropriately   Affect/Mood Appropriate   Goals Achieved Identified feelings; Able to listen to others; Able to engage in interactions     Patient utilized the provided materials to engage in the given directive  She worked on her coloring page throughout her creative process  She engaged in appropriate interactions with this therapist during her time in group  She left early due to feeling too hot in the group  She did not return before the session ended

## 2022-02-16 VITALS
HEIGHT: 64 IN | SYSTOLIC BLOOD PRESSURE: 112 MMHG | HEART RATE: 75 BPM | DIASTOLIC BLOOD PRESSURE: 74 MMHG | BODY MASS INDEX: 45.27 KG/M2 | OXYGEN SATURATION: 98 % | TEMPERATURE: 98.2 F | RESPIRATION RATE: 18 BRPM | WEIGHT: 265.2 LBS

## 2022-02-16 LAB — VALPROATE SERPL-MCNC: 66 UG/ML (ref 50–100)

## 2022-02-16 PROCEDURE — 99239 HOSP IP/OBS DSCHRG MGMT >30: CPT | Performed by: PHYSICIAN ASSISTANT

## 2022-02-16 RX ADMIN — FLUOXETINE 80 MG: 20 CAPSULE ORAL at 08:55

## 2022-02-16 RX ADMIN — LISINOPRIL 20 MG: 20 TABLET ORAL at 08:55

## 2022-02-16 RX ADMIN — Medication 1000 UNITS: at 08:55

## 2022-02-16 RX ADMIN — ZIPRASIDONE HYDROCHLORIDE 40 MG: 40 CAPSULE ORAL at 08:55

## 2022-02-16 RX ADMIN — BUPROPION HYDROCHLORIDE 300 MG: 300 TABLET, FILM COATED, EXTENDED RELEASE ORAL at 08:55

## 2022-02-16 RX ADMIN — ASPIRIN 81 MG CHEWABLE TABLET 81 MG: 81 TABLET CHEWABLE at 08:55

## 2022-02-16 NOTE — CASE MANAGEMENT
CM met with Pt, who was in her room reading; she reported she was looking forward to being allowed to take the book home to finish reading  Pt also had on a new shirt, which she said that a peer who discharged had given to her  Pt & CM reviewed & signed IMM  Pt verbalized readiness for discharge & said that her rewards calender will be restarted  She said that she is unsure what her reward will be, but she is going to try to stay out of the hospital   Pt had no questions about her d/c

## 2022-02-16 NOTE — NURSING NOTE
Patient is visible and social throughout this evening, reading book in day room in between meals and groups  Pt aware of pending discharge tomorrow, continues to deny SI and did not c/o bothersome AH tonight  Compliant with meds, aside from melatonin tonight

## 2022-02-16 NOTE — PROGRESS NOTES
Status: Pt pleasant & reading in her room at times  Valproic acid level was 66 ug/mL on 2/15 & COVID test was negative  She attended most groups & slept overnight, no issus to report  Medication: no changes / no PRNs  D/C: Today - staff from [facility] are to pick her up around 10/11 AM / Pt is scheduled to see her therapist today & psychiatrist on Tuesday  Her prescriptions were sent to [facility] yesterday to be filled & delivered  02/16/22 0750   Team Meeting   Meeting Type Daily Rounds   Team Members Present   Team Members Present Physician;Nurse;; Other (Discipline and Name)   Physician Team Member Dr Justice Howe / Ru Salter / Tyson Taylor Member King Lily / Clau Brown Management Team Member Mayra Sousa / Steven Toussaint   Other (Discipline and Name) Adrianne Murphy (art therapy)   Patient/Family Present   Patient Present No   Patient's Family Present No

## 2022-02-16 NOTE — PLAN OF CARE
Problem: Alteration in Thoughts and Perception  Goal: Treatment Goal: Gain control of psychotic behaviors/thinking, reduce/eliminate presenting symptoms and demonstrate improved reality functioning upon discharge  Outcome: Adequate for Discharge  Goal: Verbalize thoughts and feelings  Description: Interventions:  - Promote a nonjudgmental and trusting relationship with the patient through active listening and therapeutic communication  - Assess patient's level of functioning, behavior and potential for risk  - Engage patient in 1 on 1 interactions  - Encourage patient to express fears, feelings, frustrations, and discuss symptoms    - Cutler patient to reality, help patient recognize reality-based thinking   - Administer medications as ordered and assess for potential side effects  - Provide the patient education related to the signs and symptoms of the illness and desired effects of prescribed medications  Outcome: Adequate for Discharge  Goal: Agree to be compliant with medication regime, as prescribed and report medication side effects  Description: Interventions:  - Offer appropriate PRN medication and supervise ingestion; conduct AIMS, as needed   Outcome: Adequate for Discharge  Goal: Attend and participate in unit activities, including therapeutic, recreational, and educational groups  Description: Interventions:  -Encourage Visitation and family involvement in care  Outcome: Adequate for Discharge  Goal: Recognize dysfunctional thoughts, communicate reality-based thoughts at the time of discharge  Description: Interventions:  - Provide medication and psycho-education to assist patient in compliance and developing insight into his/her illness   Outcome: Adequate for Discharge  Goal: Complete daily ADLs, including personal hygiene independently, as able  Description: Interventions:  - Observe, teach, and assist patient with ADLS  - Monitor and promote a balance of rest/activity, with adequate nutrition and elimination   Outcome: Adequate for Discharge     Problem: Ineffective Coping  Goal: Identifies ineffective coping skills  Outcome: Adequate for Discharge  Goal: Identifies healthy coping skills  Outcome: Adequate for Discharge  Goal: Demonstrates healthy coping skills  Outcome: Adequate for Discharge  Goal: Participates in unit activities  Description: Interventions:  - Provide therapeutic environment   - Provide required programming   - Redirect inappropriate behaviors   Outcome: Adequate for Discharge  Goal: Patient/Family participate in treatment and DC plans  Description: Interventions:  - Provide therapeutic environment  Outcome: Adequate for Discharge  Goal: Patient/Family verbalizes awareness of resources  Outcome: Adequate for Discharge  Goal: Understands least restrictive measures  Description: Interventions:  - Utilize least restrictive behavior  Outcome: Adequate for Discharge     Problem: Risk for Self Injury/Neglect  Goal: Treatment Goal: Remain safe during length of stay, learn and adopt new coping skills, and be free of self-injurious ideation, impulses and acts at the time of discharge  Outcome: Adequate for Discharge  Goal: Verbalize thoughts and feelings  Description: Interventions:  - Assess and re-assess patient's lethality and potential for self-injury  - Engage patient in 1:1 interactions, daily, for a minimum of 15 minutes  - Encourage patient to express feelings, fears, frustrations, hopes  - Establish rapport/trust with patient   Outcome: Adequate for Discharge  Goal: Refrain from harming self  Description: Interventions:  - Monitor patient closely, per order  - Develop a trusting relationship  - Supervise medication ingestion, monitor effects and side effects   Outcome: Adequate for Discharge  Goal: Attend and participate in unit activities, including therapeutic, recreational, and educational groups  Description: Interventions:  - Provide therapeutic and educational activities daily, encourage attendance and participation, and document same in the medical record  - Obtain collateral information, encourage visitation and family involvement in care   Outcome: Adequate for Discharge  Goal: Recognize maladaptive responses and adopt new coping mechanisms  Outcome: Adequate for Discharge  Goal: Complete daily ADLs, including personal hygiene independently, as able  Description: Interventions:  - Observe, teach, and assist patient with ADLS  - Monitor and promote a balance of rest/activity, with adequate nutrition and elimination  Outcome: Adequate for Discharge     Problem: Depression  Goal: Treatment Goal: Demonstrate behavioral control of depressive symptoms, verbalize feelings of improved mood/affect, and adopt new coping skills prior to discharge  Outcome: Adequate for Discharge  Goal: Verbalize thoughts and feelings  Description: Interventions:  - Assess and re-assess patient's level of risk   - Engage patient in 1:1 interactions, daily, for a minimum of 15 minutes   - Encourage patient to express feelings, fears, frustrations, hopes   Outcome: Adequate for Discharge  Goal: Refrain from harming self  Description: Interventions:  - Monitor patient closely, per order   - Supervise medication ingestion, monitor effects and side effects   Outcome: Adequate for Discharge  Goal: Refrain from isolation  Description: Interventions:  - Develop a trusting relationship   - Encourage socialization   Outcome: Adequate for Discharge  Goal: Refrain from self-neglect  Outcome: Adequate for Discharge  Goal: Attend and participate in unit activities, including therapeutic, recreational, and educational groups  Description: Interventions:  - Provide therapeutic and educational activities daily, encourage attendance and participation, and document same in the medical record   Outcome: Adequate for Discharge  Goal: Complete daily ADLs, including personal hygiene independently, as able  Description: Interventions:  - Observe, teach, and assist patient with ADLS  -  Monitor and promote a balance of rest/activity, with adequate nutrition and elimination   Outcome: Adequate for Discharge     Problem: SELF HARM/SUICIDALITY  Goal: Will have no self-injury during hospital stay  Description: INTERVENTIONS:  - Q 15 MINUTES: Routine safety checks  - Q WAKING SHIFT & PRN: Assess risk to determine if routine checks are adequate to maintain patient safety  - Encourage patient to participate actively in care by formulating a plan to combat response to suicidal ideation, identify supports and resources  Outcome: Adequate for Discharge     Problem: DISCHARGE PLANNING  Goal: Discharge to home or other facility with appropriate resources  Description: INTERVENTIONS:  - Identify barriers to discharge w/patient and caregiver  - Arrange for needed discharge resources and transportation as appropriate  - Identify discharge learning needs (meds, wound care, etc )  - Arrange for interpretive services to assist at discharge as needed  - Refer to Case Management Department for coordinating discharge planning if the patient needs post-hospital services based on physician/advanced practitioner order or complex needs related to functional status, cognitive ability, or social support system  Outcome: Adequate for Discharge     Problem: Nutrition/Hydration-ADULT  Goal: Nutrient/Hydration intake appropriate for improving, restoring or maintaining nutritional needs  Description: Monitor and assess patient's nutrition/hydration status for malnutrition  Collaborate with interdisciplinary team and initiate plan and interventions as ordered  Monitor patient's weight and dietary intake as ordered or per policy  Utilize nutrition screening tool and intervene as necessary  Determine patient's food preferences and provide high-protein, high-caloric foods as appropriate       INTERVENTIONS:  - Monitor oral intake, urinary output, labs, and treatment plans  - Assess nutrition and hydration status and recommend course of action  - Evaluate amount of meals eaten  - Assist patient with eating if necessary   - Allow adequate time for meals  - Recommend/ encourage appropriate diets, oral nutritional supplements, and vitamin/mineral supplements  - Order, calculate, and assess calorie counts as needed  - Recommend, monitor, and adjust tube feedings and TPN/PPN based on assessed needs  - Assess need for intravenous fluids  - Provide specific nutrition/hydration education as appropriate  - Include patient/family/caregiver in decisions related to nutrition  Outcome: Adequate for Discharge     Expressed readiness for discharge

## 2022-02-16 NOTE — NURSING NOTE
Polite and friendly during interaction  Expressed readiness for discharge  Discussed healthy coping skills and patient mentioned her friend recently gave her some movies she is excited to watch  Denies SI/HI and hallucinations  Encouraged compliance with medications and OP appointments  No questions or concerns

## 2022-02-17 NOTE — PROGRESS NOTES
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

## 2022-03-25 ENCOUNTER — APPOINTMENT (EMERGENCY)
Dept: RADIOLOGY | Facility: HOSPITAL | Age: 52
End: 2022-03-25
Payer: MEDICARE

## 2022-03-25 VITALS
SYSTOLIC BLOOD PRESSURE: 118 MMHG | TEMPERATURE: 98.4 F | RESPIRATION RATE: 16 BRPM | BODY MASS INDEX: 46.26 KG/M2 | OXYGEN SATURATION: 99 % | DIASTOLIC BLOOD PRESSURE: 54 MMHG | HEART RATE: 74 BPM | HEIGHT: 64 IN | WEIGHT: 271 LBS

## 2022-03-25 LAB
ALBUMIN SERPL BCP-MCNC: 3.4 G/DL (ref 3.5–5)
ALP SERPL-CCNC: 69 U/L (ref 46–116)
ALT SERPL W P-5'-P-CCNC: 27 U/L (ref 12–78)
ANION GAP SERPL CALCULATED.3IONS-SCNC: 6 MMOL/L (ref 4–13)
AST SERPL W P-5'-P-CCNC: 12 U/L (ref 5–45)
ATRIAL RATE: 95 BPM
BASOPHILS # BLD AUTO: 0.03 THOUSANDS/ΜL (ref 0–0.1)
BASOPHILS NFR BLD AUTO: 0 % (ref 0–1)
BILIRUB SERPL-MCNC: 0.4 MG/DL (ref 0.2–1)
BUN SERPL-MCNC: 20 MG/DL (ref 5–25)
CALCIUM ALBUM COR SERPL-MCNC: 9.5 MG/DL (ref 8.3–10.1)
CALCIUM SERPL-MCNC: 9 MG/DL (ref 8.3–10.1)
CARDIAC TROPONIN I PNL SERPL HS: <2 NG/L
CHLORIDE SERPL-SCNC: 101 MMOL/L (ref 100–108)
CO2 SERPL-SCNC: 30 MMOL/L (ref 21–32)
CREAT SERPL-MCNC: 0.65 MG/DL (ref 0.6–1.3)
EOSINOPHIL # BLD AUTO: 0.1 THOUSAND/ΜL (ref 0–0.61)
EOSINOPHIL NFR BLD AUTO: 1 % (ref 0–6)
ERYTHROCYTE [DISTWIDTH] IN BLOOD BY AUTOMATED COUNT: 14.2 % (ref 11.6–15.1)
GFR SERPL CREATININE-BSD FRML MDRD: 103 ML/MIN/1.73SQ M
GLUCOSE SERPL-MCNC: 108 MG/DL (ref 65–140)
HCT VFR BLD AUTO: 39.2 % (ref 34.8–46.1)
HGB BLD-MCNC: 12.9 G/DL (ref 11.5–15.4)
IMM GRANULOCYTES # BLD AUTO: 0.08 THOUSAND/UL (ref 0–0.2)
IMM GRANULOCYTES NFR BLD AUTO: 1 % (ref 0–2)
LYMPHOCYTES # BLD AUTO: 2.12 THOUSANDS/ΜL (ref 0.6–4.47)
LYMPHOCYTES NFR BLD AUTO: 22 % (ref 14–44)
MCH RBC QN AUTO: 32.1 PG (ref 26.8–34.3)
MCHC RBC AUTO-ENTMCNC: 32.9 G/DL (ref 31.4–37.4)
MCV RBC AUTO: 98 FL (ref 82–98)
MONOCYTES # BLD AUTO: 1.05 THOUSAND/ΜL (ref 0.17–1.22)
MONOCYTES NFR BLD AUTO: 11 % (ref 4–12)
NEUTROPHILS # BLD AUTO: 6.47 THOUSANDS/ΜL (ref 1.85–7.62)
NEUTS SEG NFR BLD AUTO: 65 % (ref 43–75)
NRBC BLD AUTO-RTO: 0 /100 WBCS
P AXIS: 50 DEGREES
PLATELET # BLD AUTO: 226 THOUSANDS/UL (ref 149–390)
PMV BLD AUTO: 9.2 FL (ref 8.9–12.7)
POTASSIUM SERPL-SCNC: 4.1 MMOL/L (ref 3.5–5.3)
PR INTERVAL: 176 MS
PROT SERPL-MCNC: 7.3 G/DL (ref 6.4–8.2)
QRS AXIS: 24 DEGREES
QRSD INTERVAL: 86 MS
QT INTERVAL: 332 MS
QTC INTERVAL: 417 MS
RBC # BLD AUTO: 4.02 MILLION/UL (ref 3.81–5.12)
SODIUM SERPL-SCNC: 137 MMOL/L (ref 136–145)
T WAVE AXIS: 90 DEGREES
VALPROATE SERPL-MCNC: 65 UG/ML (ref 50–100)
VENTRICULAR RATE: 95 BPM
WBC # BLD AUTO: 9.85 THOUSAND/UL (ref 4.31–10.16)

## 2022-03-25 PROCEDURE — 36415 COLL VENOUS BLD VENIPUNCTURE: CPT

## 2022-03-25 PROCEDURE — 99284 EMERGENCY DEPT VISIT MOD MDM: CPT

## 2022-03-25 PROCEDURE — 84484 ASSAY OF TROPONIN QUANT: CPT | Performed by: EMERGENCY MEDICINE

## 2022-03-25 PROCEDURE — 71045 X-RAY EXAM CHEST 1 VIEW: CPT

## 2022-03-25 PROCEDURE — 93010 ELECTROCARDIOGRAM REPORT: CPT | Performed by: INTERNAL MEDICINE

## 2022-03-25 PROCEDURE — 80164 ASSAY DIPROPYLACETIC ACD TOT: CPT | Performed by: EMERGENCY MEDICINE

## 2022-03-25 PROCEDURE — 85025 COMPLETE CBC W/AUTO DIFF WBC: CPT | Performed by: EMERGENCY MEDICINE

## 2022-03-25 PROCEDURE — 93005 ELECTROCARDIOGRAM TRACING: CPT

## 2022-03-25 PROCEDURE — 80053 COMPREHEN METABOLIC PANEL: CPT | Performed by: EMERGENCY MEDICINE

## 2022-03-26 ENCOUNTER — HOSPITAL ENCOUNTER (EMERGENCY)
Facility: HOSPITAL | Age: 52
Discharge: HOME | End: 2022-03-26
Attending: EMERGENCY MEDICINE | Admitting: EMERGENCY MEDICINE
Payer: MEDICARE

## 2022-03-26 DIAGNOSIS — R42 LIGHTHEADEDNESS: Primary | ICD-10-CM

## 2022-03-26 PROCEDURE — 99284 EMERGENCY DEPT VISIT MOD MDM: CPT | Performed by: EMERGENCY MEDICINE

## 2022-03-26 NOTE — ED PROVIDER NOTES
History  Chief Complaint   Patient presents with    Headache     Pt reports having a headache and dizziness since 1030 this am when she was visiting the local [facility] with her caregiver (pt got 2 DVD's - "hostel" and "ride along")  Denies LOC, denies head strike, denies v/d, denies trauma/fall  denies drugs/ETOH  denies SI/HI  This is a 23-year-old female presents from wound tracks are for evaluation of temporal headache bilateral dizziness tingling in her arms and diarrhea  History provided by:  Patient  Medical Problem  Location:  Generalized  Quality:  Lightheadedness headache diarrhea  Severity:  Moderate  Onset quality:  Gradual  Duration:  1 day  Timing:  Constant  Chronicity:  New  Context:  Viral syndrome  Associated symptoms: diarrhea and headaches        Cannot display prior to admission medications because the patient has not been admitted in this contact  Past Medical History:   Diagnosis Date    Anxiety     [facility]     Bipolar affective disorder, depressed, severe ([facility] ) 10/10/2021    Borderline personality disorder ([facility] )     CAD (coronary artery disease)     Cognitive impairment     Depression     Dyslipidemia     GERD (gastroesophageal reflux disease)     Hypertension     Obesity     Psychiatric disorder     PTSD (post-traumatic stress disorder)     Schizoaffective disorder ([facility] )     Seizure ([facility] )        Past Surgical History:   Procedure Laterality Date    APPENDECTOMY      PATIENT DENIES HAVING APPENDIX REMOVED    CHOLECYSTECTOMY      FOOT SURGERY Right        History reviewed  No pertinent family history  I have reviewed and agree with the history as documented      E-Cigarette/Vaping    E-Cigarette Use Never User      E-Cigarette/Vaping Substances    Nicotine No     THC No     CBD No     Flavoring No     Other No     Unknown No      Social History     Tobacco Use    Smoking status: Never Smoker    Smokeless tobacco: Never Used   Unype Needs Tobacco comment: Pt stated "smokes when stressed"   Vaping Use    Vaping Use: Never used   Substance Use Topics    Alcohol use: Not Currently    Drug use: Not Currently       Review of Systems   Gastrointestinal: Positive for diarrhea  Neurological: Positive for dizziness, light-headedness and headaches  All other systems reviewed and are negative  Physical Exam  Physical Exam  Vitals and nursing note reviewed  Constitutional:       General: She is not in acute distress  Appearance: She is not ill-appearing, toxic-appearing or diaphoretic  HENT:      Head: Normocephalic and atraumatic  Right Ear: Tympanic membrane, ear canal and external ear normal       Left Ear: Tympanic membrane, ear canal and external ear normal    Eyes:      General: No scleral icterus  Right eye: No discharge  Left eye: No discharge  Extraocular Movements: Extraocular movements intact  Pupils: Pupils are equal, round, and reactive to light  Neck:      Vascular: No carotid bruit  Cardiovascular:      Rate and Rhythm: Normal rate and regular rhythm  Pulses: Normal pulses  Heart sounds: No murmur heard  No friction rub  No gallop  Pulmonary:      Effort: Pulmonary effort is normal  No respiratory distress  Breath sounds: Normal breath sounds  No stridor  No wheezing, rhonchi or rales  Abdominal:      General: There is no distension  Palpations: Abdomen is soft  Tenderness: There is no abdominal tenderness  There is no guarding or rebound  Musculoskeletal:         General: No swelling, tenderness, deformity or signs of injury  Normal range of motion  Cervical back: Normal range of motion and neck supple  No rigidity or tenderness  Right lower leg: No edema  Left lower leg: No edema  Lymphadenopathy:      Cervical: No cervical adenopathy  Skin:     General: Skin is warm and dry  Coloration: Skin is not jaundiced        Findings: No bruising, erythema or rash  Neurological:      General: No focal deficit present  Mental Status: She is alert and oriented to person, place, and time  Cranial Nerves: No cranial nerve deficit  Sensory: No sensory deficit        Coordination: Coordination normal    Psychiatric:         Mood and Affect: Mood normal          Behavior: Behavior normal          Vital Signs  ED Triage Vitals [03/25/22 1609]   Temperature Pulse Respirations Blood Pressure SpO2   98 7 °F (37 1 °C) 81 16 121/57 99 %      Temp Source Heart Rate Source Patient Position - Orthostatic VS BP Location FiO2 (%)   Temporal Monitor Sitting Right arm --      Pain Score       10 - Worst Possible Pain           Vitals:    03/25/22 1609 03/25/22 2034   BP: 121/57 118/54   Pulse: 81 74   Patient Position - Orthostatic VS: Sitting Sitting         Visual Acuity      ED Medications  Medications - No data to display    Diagnostic Studies  Results Reviewed     Procedure Component Value Units Date/Time    HS Troponin I 4hr [[id number]]     Lab Status: No result Specimen: Blood     HS Troponin I 2hr [[id number]]     Lab Status: No result Specimen: Blood     HS Troponin 0hr (reflex protocol) [[id number]]  (Normal) Collected: 03/25/22 1636    Lab Status: Final result Specimen: Blood from Arm, Right Updated: 03/25/22 1723     hs TnI 0hr <2 ng/L     Comprehensive metabolic panel [[id number]]  (Abnormal) Collected: 03/25/22 1636    Lab Status: Final result Specimen: Blood from Arm, Right Updated: 03/25/22 1717     Sodium 137 mmol/L      Potassium 4 1 mmol/L      Chloride 101 mmol/L      CO2 30 mmol/L      ANION GAP 6 mmol/L      BUN 20 mg/dL      Creatinine 0 65 mg/dL      Glucose 108 mg/dL      Calcium 9 0 mg/dL      Corrected Calcium 9 5 mg/dL      AST 12 U/L      ALT 27 U/L      Alkaline Phosphatase 69 U/L      Total Protein 7 3 g/dL      Albumin 3 4 g/dL      Total Bilirubin 0 40 mg/dL      eGFR 103 ml/min/1 73sq m     Narrative:      National Kidney Disease Foundation guidelines for Chronic Kidney Disease (CKD):     Stage 1 with normal or high GFR (GFR > 90 mL/min/1 73 square meters)    Stage 2 Mild CKD (GFR = 60-89 mL/min/1 73 square meters)    Stage 3A Moderate CKD (GFR = 45-59 mL/min/1 73 square meters)    Stage 3B Moderate CKD (GFR = 30-44 mL/min/1 73 square meters)    Stage 4 Severe CKD (GFR = 15-29 mL/min/1 73 square meters)    Stage 5 End Stage CKD (GFR <15 mL/min/1 73 square meters)  Note: GFR calculation is accurate only with a steady state creatinine    Valproic acid level, total [[id number]]  (Normal) Collected: 03/25/22 1636    Lab Status: Final result Specimen: Blood from Arm, Right Updated: 03/25/22 1714     Valproic Acid, Total 65 ug/mL     CBC and differential [[id number]] Collected: 03/25/22 1636    Lab Status: Final result Specimen: Blood from Arm, Right Updated: 03/25/22 1645     WBC 9 85 Thousand/uL      RBC 4 02 Million/uL      Hemoglobin 12 9 g/dL      Hematocrit 39 2 %      MCV 98 fL      MCH 32 1 pg      MCHC 32 9 g/dL      RDW 14 2 %      MPV 9 2 fL      Platelets 904 Thousands/uL      nRBC 0 /100 WBCs      Neutrophils Relative 65 %      Immat GRANS % 1 %      Lymphocytes Relative 22 %      Monocytes Relative 11 %      Eosinophils Relative 1 %      Basophils Relative 0 %      Neutrophils Absolute 6 47 Thousands/µL      Immature Grans Absolute 0 08 Thousand/uL      Lymphocytes Absolute 2 12 Thousands/µL      Monocytes Absolute 1 05 Thousand/µL      Eosinophils Absolute 0 10 Thousand/µL      Basophils Absolute 0 03 Thousands/µL                  XR chest 1 view portable   ED Interpretation by Demetria Arias DO (03/26 0003)   No acute infiltrate or congestive heart failure                 Procedures  ECG 12 Lead Documentation Only    Date/Time: 3/26/2022 12:03 AM  Performed by: Demetria Arias DO  Authorized by: Demetria Arias DO     ECG reviewed by me, the ED Provider: yes    Patient location:  ED  Rate:     ECG rate:  95  Rhythm: Rhythm: sinus rhythm    Conduction:     Conduction: normal    T waves:     T waves: normal               ED Course             HEART Risk Score      Most Recent Value   Heart Score Risk Calculator    History 0 Filed at: 03/26/2022 0004   ECG 0 Filed at: 03/26/2022 0004   Age 1 Filed at: 03/26/2022 0004   Risk Factors 1 Filed at: 03/26/2022 0004   Troponin 0 Filed at: 03/26/2022 0004   HEART Score 2 Filed at: 03/26/2022 0004                        SBIRT 20yo+      Most Recent Value   SBIRT (23 yo +)    In order to provide better care to our patients, we are screening all of our patients for alcohol and drug use  Would it be okay to ask you these screening questions? Yes Filed at: 03/25/2022 1637   Initial Alcohol Screen: US AUDIT-C     1  How often do you have a drink containing alcohol? 0 Filed at: 03/25/2022 1637   2  How many drinks containing alcohol do you have on a typical day you are drinking? 0 Filed at: 03/25/2022 1637   3a  Male UNDER 65: How often do you have five or more drinks on one occasion? 0 Filed at: 03/25/2022 1637   3b  FEMALE Any Age, or MALE 65+: How often do you have 4 or more drinks on one occassion? 0 Filed at: 03/25/2022 1637   Audit-C Score 0 Filed at: 03/25/2022 1637   ADT: How many times in the past year have you    Used an illegal drug or used a prescription medication for non-medical reasons? Never Filed at: 03/25/2022 1637                    MDM    Disposition  Final diagnoses:   Lightheadedness     Time reflects when diagnosis was documented in both MDM as applicable and the Disposition within this note     Time User Action Codes Description Comment    3/26/2022 12:10 AM Lindy Lovett Add [R42] Lightheadedness       ED Disposition     ED Disposition Condition Date/Time Comment    Discharge Stable Sat Mar 26, 2022 12:10 AM Leydi Xiong discharge to home/self care              Follow-up Information     Follow up With Specialties Details Why Contact Info Additional Information Leydi Rivera MD Internal Medicine   [address] [facility],Third Floor  [address] [place] [address]  [address] [facility],[address] Emergency Department Emergency Medicine  As needed, If symptoms worsen [address] [place], [address]  [address] [facility] Emergency Department, [facility], Ayesha Bender [address]          Patient's Medications   Discharge Prescriptions    No medications on file       No discharge procedures on file      PDMP Review     None          ED Provider  Electronically Signed by           Peyton Hairston DO  03/26/22 0010

## 2022-04-10 ENCOUNTER — HOSPITAL ENCOUNTER (EMERGENCY)
Facility: HOSPITAL | Age: 52
End: 2022-04-11
Attending: EMERGENCY MEDICINE | Admitting: EMERGENCY MEDICINE
Payer: MEDICARE

## 2022-04-10 DIAGNOSIS — R56.9 SEIZURES (HCC): ICD-10-CM

## 2022-04-10 DIAGNOSIS — R42 VERTIGO: ICD-10-CM

## 2022-04-10 DIAGNOSIS — E78.5 HYPERLIPIDEMIA: ICD-10-CM

## 2022-04-10 DIAGNOSIS — F32.A DEPRESSION WITH SUICIDAL IDEATION: Primary | ICD-10-CM

## 2022-04-10 DIAGNOSIS — I10 PRIMARY HYPERTENSION: ICD-10-CM

## 2022-04-10 DIAGNOSIS — R45.851 DEPRESSION WITH SUICIDAL IDEATION: Primary | ICD-10-CM

## 2022-04-10 PROCEDURE — 99285 EMERGENCY DEPT VISIT HI MDM: CPT | Performed by: PHYSICIAN ASSISTANT

## 2022-04-10 PROCEDURE — 80307 DRUG TEST PRSMV CHEM ANLYZR: CPT | Performed by: PHYSICIAN ASSISTANT

## 2022-04-10 PROCEDURE — 99285 EMERGENCY DEPT VISIT HI MDM: CPT

## 2022-04-10 PROCEDURE — 0241U HB NFCT DS VIR RESP RNA 4 TRGT: CPT | Performed by: PHYSICIAN ASSISTANT

## 2022-04-10 PROCEDURE — 82075 ASSAY OF BREATH ETHANOL: CPT | Performed by: PHYSICIAN ASSISTANT

## 2022-04-10 PROCEDURE — 81025 URINE PREGNANCY TEST: CPT | Performed by: PHYSICIAN ASSISTANT

## 2022-04-10 RX ORDER — LEVOCARNITINE 330 MG/1
330 TABLET ORAL DAILY
COMMUNITY
End: 2022-04-19 | Stop reason: HOSPADM

## 2022-04-10 RX ORDER — MELATONIN
1000 DAILY
Status: DISCONTINUED | OUTPATIENT
Start: 2022-04-11 | End: 2022-04-11 | Stop reason: HOSPADM

## 2022-04-10 RX ORDER — LISINOPRIL 20 MG/1
20 TABLET ORAL ONCE
Status: DISCONTINUED | OUTPATIENT
Start: 2022-04-11 | End: 2022-04-11 | Stop reason: HOSPADM

## 2022-04-10 RX ORDER — POLYETHYLENE GLYCOL 3350 17 G/17G
17 POWDER, FOR SOLUTION ORAL DAILY
Status: DISCONTINUED | OUTPATIENT
Start: 2022-04-11 | End: 2022-04-11 | Stop reason: HOSPADM

## 2022-04-10 RX ORDER — LEVOCARNITINE 1 G/10ML
330 SOLUTION ORAL DAILY
Status: DISCONTINUED | OUTPATIENT
Start: 2022-04-10 | End: 2022-04-11 | Stop reason: HOSPADM

## 2022-04-10 RX ORDER — LANOLIN ALCOHOL/MO/W.PET/CERES
3 CREAM (GRAM) TOPICAL
Status: DISCONTINUED | OUTPATIENT
Start: 2022-04-10 | End: 2022-04-11 | Stop reason: HOSPADM

## 2022-04-10 RX ORDER — ASPIRIN 81 MG/1
81 TABLET, CHEWABLE ORAL ONCE
Status: COMPLETED | OUTPATIENT
Start: 2022-04-11 | End: 2022-04-11

## 2022-04-10 RX ORDER — FLUOXETINE HYDROCHLORIDE 20 MG/1
80 CAPSULE ORAL DAILY
Status: DISCONTINUED | OUTPATIENT
Start: 2022-04-10 | End: 2022-04-11 | Stop reason: HOSPADM

## 2022-04-10 RX ORDER — BUPROPION HYDROCHLORIDE 300 MG/1
300 TABLET ORAL DAILY
Status: DISCONTINUED | OUTPATIENT
Start: 2022-04-11 | End: 2022-04-11 | Stop reason: HOSPADM

## 2022-04-10 RX ORDER — ATORVASTATIN CALCIUM 40 MG/1
40 TABLET, FILM COATED ORAL
Status: DISCONTINUED | OUTPATIENT
Start: 2022-04-11 | End: 2022-04-11 | Stop reason: HOSPADM

## 2022-04-10 RX ORDER — ZIPRASIDONE HYDROCHLORIDE 20 MG/1
40 CAPSULE ORAL 2 TIMES DAILY WITH MEALS
Status: DISCONTINUED | OUTPATIENT
Start: 2022-04-11 | End: 2022-04-11 | Stop reason: HOSPADM

## 2022-04-10 RX ORDER — DIVALPROEX SODIUM 500 MG/1
2000 TABLET, EXTENDED RELEASE ORAL
Status: DISCONTINUED | OUTPATIENT
Start: 2022-04-10 | End: 2022-04-11 | Stop reason: HOSPADM

## 2022-04-10 RX ORDER — HYDROXYZINE PAMOATE 50 MG/1
50 CAPSULE ORAL 3 TIMES DAILY PRN
COMMUNITY
End: 2022-04-19 | Stop reason: HOSPADM

## 2022-04-10 RX ADMIN — LEVOCARNITINE 330 MG: 1 SOLUTION ORAL at 22:16

## 2022-04-10 RX ADMIN — Medication 3 MG: at 22:16

## 2022-04-10 RX ADMIN — FLUOXETINE 80 MG: 20 CAPSULE ORAL at 22:15

## 2022-04-10 RX ADMIN — DIVALPROEX SODIUM 2000 MG: 500 TABLET, EXTENDED RELEASE ORAL at 22:16

## 2022-04-10 NOTE — ED NOTES
[facility]:    MEDICARE PART B 04/10/2022   MEDICARE PART A   04/10/2022    [id number] [facility] 04/10/2022   [facility]

## 2022-04-10 NOTE — ED NOTES
Pt states her plan is to "walk in front of a moving car"  When asked why she explains "I am afraid my boyfriend is going to leave me because I keep making up thoughts in my head"       Eleazar Paula  04/10/22 8669

## 2022-04-10 NOTE — ED NOTES
1900 - assumed care of pt from [address] [facility], [address] [facility]  Pt has a safety sitter for continuous observation       Ela Castro, JOSÉ MIGUEL  04/10/22 1954

## 2022-04-10 NOTE — ED PROVIDER NOTES
History  Chief Complaint   Patient presents with    Psychiatric Evaluation     To ED for evaluation of worsening SI and "negative thoughts " since last night  Today patient cut left forearm with a thumb tack  Patient is a 48 yo wf with history of anxiety, bipolar disorder, borderline personality disorder, PTSD, schizoaffective disorder HTN, GERD from [facility] stating she has been suicidal since last night  States voices are telling her that her boyfriend Cherrie Dumont is going to leave her  Reports she scratched her L forearm multiple times with thumb tack today  Denies illicit drug use or alcohol use  Denies headache, cp, sob, abd pain, n/v/d  Prior to Admission Medications   Prescriptions Last Dose Informant Patient Reported? Taking?    FLUoxetine (PROzac) 40 MG capsule   No No   Sig: Take 2 capsules (80 mg total) by mouth daily   aspirin 81 mg chewable tablet   No No   Sig: Chew 1 tablet (81 mg total) daily   atorvastatin (LIPITOR) 40 mg tablet   No No   Sig: Take 1 tablet (40 mg total) by mouth daily with dinner   buPROPion (WELLBUTRIN XL) 300 mg 24 hr tablet   No No   Sig: Take 1 tablet (300 mg total) by mouth daily   cholecalciferol (VITAMIN D3) 1,000 units tablet   No No   Sig: Take 1 tablet (1,000 Units total) by mouth daily   divalproex sodium (DEPAKOTE ER) 500 mg 24 hr tablet   No No   Sig: Take 4 tablets (2,000 mg total) by mouth daily at bedtime   hydrOXYzine pamoate (VISTARIL) 50 mg capsule  Outside Facility (Specify) Yes Yes   Sig: Take 50 mg by mouth 3 (three) times a day as needed for itching   levOCARNitine (CARNITOR) 330 MG tablet  Outside Facility (Specify) Yes Yes   Sig: Take 330 mg by mouth in the morning   lisinopril (ZESTRIL) 20 mg tablet   No No   Sig: Take 1 tablet (20 mg total) by mouth daily   melatonin 3 mg   No No   Sig: Take 1 tablet (3 mg total) by mouth daily at bedtime   ziprasidone (GEODON) 40 mg capsule   No No   Sig: Take 1 capsule (40 mg total) by mouth 2 (two) times a day with meals      Facility-Administered Medications: None       Past Medical History:   Diagnosis Date    Anxiety     Bipolar 1 disorder ([place] [address] )     Bipolar affective disorder, depressed, severe ([place] [address] ) 10/10/2021    Borderline personality disorder ([place] [address] )     CAD (coronary artery disease)     Cognitive impairment     Depression     Dyslipidemia     GERD (gastroesophageal reflux disease)     Hypertension     Obesity     Psychiatric disorder     PTSD (post-traumatic stress disorder)     Schizoaffective disorder ([place] [address] )     Seizure ([place] [address] )        Past Surgical History:   Procedure Laterality Date    APPENDECTOMY      PATIENT DENIES HAVING APPENDIX REMOVED    CHOLECYSTECTOMY      FOOT SURGERY Right        History reviewed  No pertinent family history  I have reviewed and agree with the history as documented  E-Cigarette/Vaping    E-Cigarette Use Never User      E-Cigarette/Vaping Substances    Nicotine No     THC No     CBD No     Flavoring No     Other No     Unknown No      Social History     Tobacco Use    Smoking status: Never Smoker    Smokeless tobacco: Never Used    Tobacco comment: Pt stated "smokes when stressed"   Vaping Use    Vaping Use: Never used   Substance Use Topics    Alcohol use: Not Currently    Drug use: Not Currently       Review of Systems   Constitutional: Negative for chills and fever  HENT: Negative for sore throat  Respiratory: Negative for cough and shortness of breath  Cardiovascular: Negative for chest pain and palpitations  Gastrointestinal: Negative for abdominal pain, nausea and vomiting  Genitourinary: Negative for dysuria and hematuria  Musculoskeletal: Negative for arthralgias and back pain  Skin: Positive for wound  Negative for rash  Neurological: Negative for headaches  Psychiatric/Behavioral: Positive for self-injury and suicidal ideas  Negative for agitation     All other systems reviewed and are negative  Physical Exam  Physical Exam  Vitals and nursing note reviewed  Constitutional:       General: She is not in acute distress  Appearance: Normal appearance  She is not ill-appearing, toxic-appearing or diaphoretic  HENT:      Head: Normocephalic  Mouth/Throat:      Mouth: Mucous membranes are moist       Pharynx: Oropharynx is clear  Eyes:      Extraocular Movements: Extraocular movements intact  Conjunctiva/sclera: Conjunctivae normal       Pupils: Pupils are equal, round, and reactive to light  Cardiovascular:      Rate and Rhythm: Normal rate and regular rhythm  Pulses: Normal pulses  Heart sounds: Normal heart sounds  Pulmonary:      Effort: Pulmonary effort is normal       Breath sounds: Normal breath sounds  Abdominal:      General: Abdomen is flat  Bowel sounds are normal       Palpations: Abdomen is soft  Musculoskeletal:         General: Normal range of motion  Cervical back: Normal range of motion and neck supple  Comments: Multiple linear abrasions to L forearm  2+ L radial pulse  Sensation, motor intact  Capillary refill <2 secs in all fingers of L hand    Skin:     General: Skin is warm and dry  Capillary Refill: Capillary refill takes less than 2 seconds  Neurological:      Mental Status: She is alert     Psychiatric:      Comments: Cooperative  Makes eye contact          Vital Signs  ED Triage Vitals [04/10/22 1705]   Temperature Pulse Respirations Blood Pressure SpO2   98 3 °F (36 8 °C) 91 20 136/62 98 %      Temp Source Heart Rate Source Patient Position - Orthostatic VS BP Location FiO2 (%)   Tympanic Monitor Sitting Right arm --      Pain Score       No Pain           Vitals:    04/10/22 1705 04/10/22 1941   BP: 136/62 115/53   Pulse: 91 83   Patient Position - Orthostatic VS: Sitting          Visual Acuity      ED Medications  Medications   aspirin chewable tablet 81 mg (has no administration in time range)   atorvastatin (LIPITOR) tablet 40 mg (has no administration in time range)   buPROPion (WELLBUTRIN XL) 24 hr tablet 300 mg (has no administration in time range)   divalproex sodium (DEPAKOTE ER) 24 hr tablet 2,000 mg (has no administration in time range)   FLUoxetine (PROzac) capsule 80 mg (has no administration in time range)   levOCARNitine (CARNITOR) oral solution 330 mg (has no administration in time range)   lisinopril (ZESTRIL) tablet 20 mg (has no administration in time range)   melatonin tablet 3 mg (has no administration in time range)   nicotine (NICODERM CQ) 7 mg/24hr TD 24 hr patch 7 mg (has no administration in time range)   nystatin-triamcinolone (MYCOLOG-II) cream (has no administration in time range)   polyethylene glycol (MIRALAX) packet 17 g (has no administration in time range)   cholecalciferol (VITAMIN D3) tablet 1,000 Units (has no administration in time range)   ziprasidone (GEODON) capsule 40 mg (has no administration in time range)       Diagnostic Studies  Results Reviewed     Procedure Component Value Units Date/Time    COVID/FLU/RSV - 2 hour TAT [[id number]]  (Normal) Collected: 04/10/22 1758    Lab Status: Final result Specimen: Nares from Nose Updated: 04/10/22 1914     SARS-CoV-2 Negative     INFLUENZA A PCR Negative     INFLUENZA B PCR Negative     RSV PCR Negative    Narrative:      FOR PEDIATRIC PATIENTS - copy/paste COVID Guidelines URL to browser: [email or web address]    SARS-CoV-2 assay is a Nucleic Acid Amplification assay intended for the  qualitative detection of nucleic acid from SARS-CoV-2 in nasopharyngeal  swabs  Results are for the presumptive identification of SARS-CoV-2 RNA  Positive results are indicative of infection with SARS-CoV-2, the virus  causing COVID-19, but do not rule out bacterial infection or co-infection  with other viruses   Laboratories within the [place] and its  territories are required to report all positive results to the appropriate  public health authorities  Negative results do not preclude SARS-CoV-2  infection and should not be used as the sole basis for treatment or other  patient management decisions  Negative results must be combined with  clinical observations, patient history, and epidemiological information  This test has not been FDA cleared or approved  This test has been authorized by FDA under an Emergency Use Authorization  (EUA)  This test is only authorized for the duration of time the  declaration that circumstances exist justifying the authorization of the  emergency use of an in vitro diagnostic tests for detection of SARS-CoV-2  virus and/or diagnosis of COVID-19 infection under section 564(b)(1) of  the Act, 21 U  S C  784VIS-3(I)(6), unless the authorization is terminated  or revoked sooner  The test has been validated but independent review by FDA  and CLIA is pending  Test performed using [employer] Gene[employer]pert: This RT-PCR assay targets N2,  a region unique to SARS-CoV-2  A conserved region in the E-gene was chosen  for pan-Sarbecovirus detection which includes SARS-CoV-2  POCT pregnancy, urine [[id number]]  (Normal) Resulted: 04/10/22 1855    Lab Status: Final result Updated: 04/10/22 1855     EXT PREG TEST UR (Ref: Negative) negative     Control valid    Rapid drug screen, urine [[id number]]  (Normal) Collected: 04/10/22 1758    Lab Status: Final result Specimen: Urine, Clean Catch Updated: 04/10/22 1822     Amph/Meth UR Negative     Barbiturate Ur Negative     Benzodiazepine Urine Negative     Cocaine Urine Negative     Methadone Urine Negative     Opiate Urine Negative     PCP Ur Negative     THC Urine Negative     Oxycodone Urine Negative    Narrative:      FOR MEDICAL PURPOSES ONLY  IF CONFIRMATION NEEDED PLEASE CONTACT THE LAB WITHIN 5 DAYS      Drug Screen Cutoff Levels:  AMPHETAMINE/METHAMPHETAMINES  1000 ng/mL  BARBITURATES     200 ng/mL  BENZODIAZEPINES     200 ng/mL  COCAINE      300 ng/mL  METHADONE      300 ng/mL  OPIATES      300 ng/mL  PHENCYCLIDINE     25 ng/mL  THC       50 ng/mL  OXYCODONE      100 ng/mL    POCT alcohol breath test [[id number]]  (Normal) Resulted: 04/10/22 1727    Lab Status: Final result Updated: 04/10/22 1733     EXTBreath Alcohol 0 00                 No orders to display              Procedures  Procedures         ED Course  ED Course as of 04/10/22 2032   Sun Apr 10, 2022   1842 Patient is medically cleared for [address] [place] [address] evaluation                                              MDM  Number of Diagnoses or Management Options  Depression with suicidal ideation: new and requires workup  Diagnosis management comments: Pt medically cleared for [address] [place] [address] evaluation  Amount and/or Complexity of Data Reviewed  Clinical lab tests: reviewed  Tests in the medicine section of CPT®: reviewed  Decide to obtain previous medical records or to obtain history from someone other than the patient: yes  Review and summarize past medical records: yes  Discuss the patient with other providers: yes  Independent visualization of images, tracings, or specimens: yes    Risk of Complications, Morbidity, and/or Mortality  Presenting problems: moderate  Diagnostic procedures: moderate  Management options: moderate    Patient Progress  Patient progress: stable      Disposition  Final diagnoses:   Depression with suicidal ideation     Time reflects when diagnosis was documented in both MDM as applicable and the Disposition within this note     Time User Action Codes Description Comment    4/10/2022  7:15 PM Jenna Johnston Add Victrix Round  A,  R40 [id number]] Depression with suicidal ideation       ED Disposition     ED Disposition Condition Date/Time Comment    Transfer to [facility] Apr 10, 2022  7:15 PM Snehal Verma should be transferred out to [address] [place] [address] and has been medically cleared          MD Documentation      Most Recent Value   Sending MD Dr Tamar Flannery None         Patient's Medications   Discharge Prescriptions    No medications on file       No discharge procedures on file      PDMP Review     None          ED Provider  Electronically Signed by           Stephy Rapp PA-C  04/10/22 2020       Stephy Rapp PA-C  04/10/22 2032       Stephy Rapp PA-C  04/10/22 7780

## 2022-04-10 NOTE — ED NOTES
Pt presented this evening to the ER with a staff from [facility]  Pt was interviewed and assessed in ED 06  Pt was a female  of stated age that displays some developmental delays mentally  Pt was a open and appeared genuine in answering questions  Pt reports a diagnosis of Bipolar DO and MDD and that she has a therapist and psychiatrist and takes her medications  Pt denies any other community supports  Pt stated that the presenting issue that brought her to the ER this evening was feeling "suicidal " Pt stated that she has SI's with a plan and would "walk in front of a moving car " Pt answered yes to hearing command voices that tell her to hurt herself but not anyone else  Pt shared that she has been inpatient hospitalized at least 20x and that she has experienced the same plan and the cutting as well multiple times  Pt denied any homicidality  Pt also admitted to self harming taking a tack and making superficial cuts on her forearm  Pt reported the precipitating event that brought her to these choices today was a belief that her boyfriend no longer wants to be with her  Pt disclosed upon questioning that he has neither said or done anything that would indicate to her that this was a rational fear  Pt stated that "she just knows " Pt acknowledged that she resides at [facility] and so does her significant other  Pt disclosed that she shares a room at the facility and has a passable relationship with her roommate but did not indicate that they were overly friendly  Pt denies any history of substance abuse, smoking, or engaging in street drugs  Pt stated that her appetite is good and that her sleep has been mixed for the last several days and shared that she sleep walks at times  Pt reported that her ideal outcome of coming to the ER would be hospitalization  Treating physician in agreement and 201 was drafted and signed

## 2022-04-11 ENCOUNTER — HOSPITAL ENCOUNTER (INPATIENT)
Facility: HOSPITAL | Age: 52
LOS: 8 days | Discharge: HOME | DRG: 885 | End: 2022-04-19
Attending: PSYCHIATRY & NEUROLOGY | Admitting: PSYCHIATRY & NEUROLOGY
Payer: MEDICARE

## 2022-04-11 VITALS
OXYGEN SATURATION: 95 % | HEART RATE: 76 BPM | TEMPERATURE: 98.2 F | SYSTOLIC BLOOD PRESSURE: 122 MMHG | WEIGHT: 271.17 LBS | DIASTOLIC BLOOD PRESSURE: 62 MMHG | BODY MASS INDEX: 46.55 KG/M2 | RESPIRATION RATE: 18 BRPM

## 2022-04-11 DIAGNOSIS — F31.5 BIPOLAR AFFECTIVE DISORDER, DEPRESSED, SEVERE, WITH PSYCHOTIC BEHAVIOR (HCC): ICD-10-CM

## 2022-04-11 DIAGNOSIS — R56.9 SEIZURES (HCC): ICD-10-CM

## 2022-04-11 DIAGNOSIS — M19.90 DJD (DEGENERATIVE JOINT DISEASE): ICD-10-CM

## 2022-04-11 DIAGNOSIS — E78.5 HYPERLIPIDEMIA, UNSPECIFIED HYPERLIPIDEMIA TYPE: ICD-10-CM

## 2022-04-11 DIAGNOSIS — E78.5 HYPERLIPIDEMIA: ICD-10-CM

## 2022-04-11 DIAGNOSIS — I10 PRIMARY HYPERTENSION: ICD-10-CM

## 2022-04-11 DIAGNOSIS — E55.9 VITAMIN D DEFICIENCY: ICD-10-CM

## 2022-04-11 DIAGNOSIS — F31.4 BIPOLAR AFFECTIVE DISORDER, DEPRESSED, SEVERE (HCC): ICD-10-CM

## 2022-04-11 DIAGNOSIS — E71.40 CARNITINE DEFICIENCY (HCC): Primary | ICD-10-CM

## 2022-04-11 DIAGNOSIS — L30.4 INTERTRIGO: ICD-10-CM

## 2022-04-11 DIAGNOSIS — I10 ESSENTIAL HYPERTENSION: ICD-10-CM

## 2022-04-11 DIAGNOSIS — R42 VERTIGO: ICD-10-CM

## 2022-04-11 LAB
ALBUMIN SERPL BCP-MCNC: 3.3 G/DL (ref 3.5–5)
ALP SERPL-CCNC: 73 U/L (ref 46–116)
ALT SERPL W P-5'-P-CCNC: 21 U/L (ref 12–78)
ANION GAP SERPL CALCULATED.3IONS-SCNC: 5 MMOL/L (ref 4–13)
AST SERPL W P-5'-P-CCNC: 16 U/L (ref 5–45)
ATRIAL RATE: 72 BPM
BASOPHILS # BLD AUTO: 0.04 THOUSANDS/ΜL (ref 0–0.1)
BASOPHILS NFR BLD AUTO: 1 % (ref 0–1)
BILIRUB SERPL-MCNC: 0.6 MG/DL (ref 0.2–1)
BUN SERPL-MCNC: 14 MG/DL (ref 5–25)
CALCIUM ALBUM COR SERPL-MCNC: 9.4 MG/DL (ref 8.3–10.1)
CALCIUM SERPL-MCNC: 8.8 MG/DL (ref 8.3–10.1)
CHLORIDE SERPL-SCNC: 98 MMOL/L (ref 100–108)
CO2 SERPL-SCNC: 29 MMOL/L (ref 21–32)
CREAT SERPL-MCNC: 0.7 MG/DL (ref 0.6–1.3)
EOSINOPHIL # BLD AUTO: 0.07 THOUSAND/ΜL (ref 0–0.61)
EOSINOPHIL NFR BLD AUTO: 1 % (ref 0–6)
ERYTHROCYTE [DISTWIDTH] IN BLOOD BY AUTOMATED COUNT: 13.8 % (ref 11.6–15.1)
GFR SERPL CREATININE-BSD FRML MDRD: 100 ML/MIN/1.73SQ M
GLUCOSE SERPL-MCNC: 106 MG/DL (ref 65–140)
HCT VFR BLD AUTO: 38 % (ref 34.8–46.1)
HGB BLD-MCNC: 12.6 G/DL (ref 11.5–15.4)
IMM GRANULOCYTES # BLD AUTO: 0.1 THOUSAND/UL (ref 0–0.2)
IMM GRANULOCYTES NFR BLD AUTO: 2 % (ref 0–2)
LYMPHOCYTES # BLD AUTO: 1.59 THOUSANDS/ΜL (ref 0.6–4.47)
LYMPHOCYTES NFR BLD AUTO: 23 % (ref 14–44)
MCH RBC QN AUTO: 32 PG (ref 26.8–34.3)
MCHC RBC AUTO-ENTMCNC: 33.2 G/DL (ref 31.4–37.4)
MCV RBC AUTO: 96 FL (ref 82–98)
MONOCYTES # BLD AUTO: 0.75 THOUSAND/ΜL (ref 0.17–1.22)
MONOCYTES NFR BLD AUTO: 11 % (ref 4–12)
NEUTROPHILS # BLD AUTO: 4.28 THOUSANDS/ΜL (ref 1.85–7.62)
NEUTS SEG NFR BLD AUTO: 62 % (ref 43–75)
NRBC BLD AUTO-RTO: 0 /100 WBCS
P AXIS: 56 DEGREES
PLATELET # BLD AUTO: 189 THOUSANDS/UL (ref 149–390)
PMV BLD AUTO: 9 FL (ref 8.9–12.7)
POTASSIUM SERPL-SCNC: 4.4 MMOL/L (ref 3.5–5.3)
PR INTERVAL: 202 MS
PROT SERPL-MCNC: 7.3 G/DL (ref 6.4–8.2)
QRS AXIS: 62 DEGREES
QRSD INTERVAL: 86 MS
QT INTERVAL: 380 MS
QTC INTERVAL: 416 MS
RBC # BLD AUTO: 3.94 MILLION/UL (ref 3.81–5.12)
SODIUM SERPL-SCNC: 132 MMOL/L (ref 136–145)
T WAVE AXIS: 67 DEGREES
VENTRICULAR RATE: 72 BPM
WBC # BLD AUTO: 6.83 THOUSAND/UL (ref 4.31–10.16)

## 2022-04-11 PROCEDURE — 85025 COMPLETE CBC W/AUTO DIFF WBC: CPT | Performed by: EMERGENCY MEDICINE

## 2022-04-11 PROCEDURE — 80053 COMPREHEN METABOLIC PANEL: CPT | Performed by: EMERGENCY MEDICINE

## 2022-04-11 PROCEDURE — 36415 COLL VENOUS BLD VENIPUNCTURE: CPT | Performed by: EMERGENCY MEDICINE

## 2022-04-11 PROCEDURE — 93005 ELECTROCARDIOGRAM TRACING: CPT

## 2022-04-11 PROCEDURE — 93010 ELECTROCARDIOGRAM REPORT: CPT | Performed by: INTERNAL MEDICINE

## 2022-04-11 RX ORDER — OLANZAPINE 10 MG/1
5 INJECTION, POWDER, LYOPHILIZED, FOR SOLUTION INTRAMUSCULAR
Status: DISCONTINUED | OUTPATIENT
Start: 2022-04-11 | End: 2022-04-19 | Stop reason: HOSPADM

## 2022-04-11 RX ORDER — MELATONIN
1000 DAILY
Status: DISCONTINUED | OUTPATIENT
Start: 2022-04-12 | End: 2022-04-19 | Stop reason: HOSPADM

## 2022-04-11 RX ORDER — ATORVASTATIN CALCIUM 40 MG/1
40 TABLET, FILM COATED ORAL
Status: CANCELLED | OUTPATIENT
Start: 2022-04-11

## 2022-04-11 RX ORDER — QUETIAPINE FUMARATE 25 MG/1
25 TABLET, FILM COATED ORAL
Status: CANCELLED | OUTPATIENT
Start: 2022-04-11

## 2022-04-11 RX ORDER — OLANZAPINE 10 MG/1
5 INJECTION, POWDER, LYOPHILIZED, FOR SOLUTION INTRAMUSCULAR
Status: CANCELLED | OUTPATIENT
Start: 2022-04-11

## 2022-04-11 RX ORDER — LORAZEPAM 2 MG/ML
1 INJECTION INTRAMUSCULAR
Status: DISCONTINUED | OUTPATIENT
Start: 2022-04-11 | End: 2022-04-19 | Stop reason: HOSPADM

## 2022-04-11 RX ORDER — QUETIAPINE FUMARATE 100 MG/1
100 TABLET, FILM COATED ORAL
Status: CANCELLED | OUTPATIENT
Start: 2022-04-11

## 2022-04-11 RX ORDER — LORAZEPAM 1 MG/1
1 TABLET ORAL
Status: CANCELLED | OUTPATIENT
Start: 2022-04-11

## 2022-04-11 RX ORDER — QUETIAPINE FUMARATE 25 MG/1
25 TABLET, FILM COATED ORAL
Status: DISCONTINUED | OUTPATIENT
Start: 2022-04-11 | End: 2022-04-19 | Stop reason: HOSPADM

## 2022-04-11 RX ORDER — LORAZEPAM 0.5 MG/1
0.5 TABLET ORAL
Status: CANCELLED | OUTPATIENT
Start: 2022-04-11

## 2022-04-11 RX ORDER — DIVALPROEX SODIUM 500 MG/1
2000 TABLET, EXTENDED RELEASE ORAL
Status: DISCONTINUED | OUTPATIENT
Start: 2022-04-11 | End: 2022-04-19 | Stop reason: HOSPADM

## 2022-04-11 RX ORDER — LORAZEPAM 0.5 MG/1
0.5 TABLET ORAL
Status: DISCONTINUED | OUTPATIENT
Start: 2022-04-11 | End: 2022-04-19 | Stop reason: HOSPADM

## 2022-04-11 RX ORDER — MELATONIN
1000 DAILY
Status: CANCELLED | OUTPATIENT
Start: 2022-04-12

## 2022-04-11 RX ORDER — LORAZEPAM 2 MG/ML
1 INJECTION INTRAMUSCULAR
Status: CANCELLED | OUTPATIENT
Start: 2022-04-11

## 2022-04-11 RX ORDER — ACETAMINOPHEN 325 MG/1
650 TABLET ORAL EVERY 4 HOURS PRN
Status: DISCONTINUED | OUTPATIENT
Start: 2022-04-11 | End: 2022-04-19 | Stop reason: HOSPADM

## 2022-04-11 RX ORDER — FLUOXETINE HYDROCHLORIDE 20 MG/1
80 CAPSULE ORAL DAILY
Status: CANCELLED | OUTPATIENT
Start: 2022-04-12

## 2022-04-11 RX ORDER — BUPROPION HYDROCHLORIDE 150 MG/1
150 TABLET ORAL DAILY
Status: CANCELLED | OUTPATIENT
Start: 2022-04-12

## 2022-04-11 RX ORDER — HYDROXYZINE HYDROCHLORIDE 25 MG/1
25 TABLET, FILM COATED ORAL
Status: DISCONTINUED | OUTPATIENT
Start: 2022-04-11 | End: 2022-04-19 | Stop reason: HOSPADM

## 2022-04-11 RX ORDER — LEVOCARNITINE 1 G/10ML
330 SOLUTION ORAL DAILY
Status: CANCELLED | OUTPATIENT
Start: 2022-04-12

## 2022-04-11 RX ORDER — LANOLIN ALCOHOL/MO/W.PET/CERES
3 CREAM (GRAM) TOPICAL
Status: CANCELLED | OUTPATIENT
Start: 2022-04-11

## 2022-04-11 RX ORDER — QUETIAPINE FUMARATE 25 MG/1
50 TABLET, FILM COATED ORAL
Status: CANCELLED | OUTPATIENT
Start: 2022-04-11

## 2022-04-11 RX ORDER — BUPROPION HYDROCHLORIDE 150 MG/1
150 TABLET ORAL DAILY
Status: DISCONTINUED | OUTPATIENT
Start: 2022-04-12 | End: 2022-04-12

## 2022-04-11 RX ORDER — POLYETHYLENE GLYCOL 3350 17 G/17G
17 POWDER, FOR SOLUTION ORAL DAILY
Status: CANCELLED | OUTPATIENT
Start: 2022-04-12

## 2022-04-11 RX ORDER — HYDROXYZINE HYDROCHLORIDE 25 MG/1
25 TABLET, FILM COATED ORAL
Status: CANCELLED | OUTPATIENT
Start: 2022-04-11

## 2022-04-11 RX ORDER — LORAZEPAM 1 MG/1
1 TABLET ORAL
Status: DISCONTINUED | OUTPATIENT
Start: 2022-04-11 | End: 2022-04-19 | Stop reason: HOSPADM

## 2022-04-11 RX ORDER — ZIPRASIDONE HYDROCHLORIDE 20 MG/1
40 CAPSULE ORAL 2 TIMES DAILY WITH MEALS
Status: DISCONTINUED | OUTPATIENT
Start: 2022-04-12 | End: 2022-04-19 | Stop reason: HOSPADM

## 2022-04-11 RX ORDER — ACETAMINOPHEN 325 MG/1
650 TABLET ORAL EVERY 4 HOURS PRN
Status: CANCELLED | OUTPATIENT
Start: 2022-04-11

## 2022-04-11 RX ORDER — ACETAMINOPHEN 325 MG/1
975 TABLET ORAL EVERY 6 HOURS PRN
Status: DISCONTINUED | OUTPATIENT
Start: 2022-04-11 | End: 2022-04-12

## 2022-04-11 RX ORDER — LEVOCARNITINE 1 G/10ML
330 SOLUTION ORAL DAILY
Status: DISCONTINUED | OUTPATIENT
Start: 2022-04-12 | End: 2022-04-19 | Stop reason: HOSPADM

## 2022-04-11 RX ORDER — QUETIAPINE FUMARATE 100 MG/1
100 TABLET, FILM COATED ORAL
Status: DISCONTINUED | OUTPATIENT
Start: 2022-04-11 | End: 2022-04-19 | Stop reason: HOSPADM

## 2022-04-11 RX ORDER — TRAZODONE HYDROCHLORIDE 50 MG/1
50 TABLET ORAL
Status: DISCONTINUED | OUTPATIENT
Start: 2022-04-11 | End: 2022-04-19 | Stop reason: HOSPADM

## 2022-04-11 RX ORDER — FLUOXETINE HYDROCHLORIDE 20 MG/1
80 CAPSULE ORAL DAILY
Status: DISCONTINUED | OUTPATIENT
Start: 2022-04-12 | End: 2022-04-19 | Stop reason: HOSPADM

## 2022-04-11 RX ORDER — DIVALPROEX SODIUM 500 MG/1
2000 TABLET, EXTENDED RELEASE ORAL
Status: CANCELLED | OUTPATIENT
Start: 2022-04-11

## 2022-04-11 RX ORDER — POLYETHYLENE GLYCOL 3350 17 G/17G
17 POWDER, FOR SOLUTION ORAL DAILY
Status: DISCONTINUED | OUTPATIENT
Start: 2022-04-12 | End: 2022-04-19 | Stop reason: HOSPADM

## 2022-04-11 RX ORDER — LANOLIN ALCOHOL/MO/W.PET/CERES
3 CREAM (GRAM) TOPICAL
Status: DISCONTINUED | OUTPATIENT
Start: 2022-04-11 | End: 2022-04-19 | Stop reason: HOSPADM

## 2022-04-11 RX ORDER — ACETAMINOPHEN 325 MG/1
975 TABLET ORAL EVERY 6 HOURS PRN
Status: CANCELLED | OUTPATIENT
Start: 2022-04-11

## 2022-04-11 RX ORDER — ATORVASTATIN CALCIUM 40 MG/1
40 TABLET, FILM COATED ORAL
Status: DISCONTINUED | OUTPATIENT
Start: 2022-04-12 | End: 2022-04-19 | Stop reason: HOSPADM

## 2022-04-11 RX ORDER — QUETIAPINE FUMARATE 50 MG/1
50 TABLET, FILM COATED ORAL
Status: DISCONTINUED | OUTPATIENT
Start: 2022-04-11 | End: 2022-04-19 | Stop reason: HOSPADM

## 2022-04-11 RX ORDER — ZIPRASIDONE HYDROCHLORIDE 20 MG/1
40 CAPSULE ORAL 2 TIMES DAILY WITH MEALS
Status: CANCELLED | OUTPATIENT
Start: 2022-04-11

## 2022-04-11 RX ORDER — TRAZODONE HYDROCHLORIDE 50 MG/1
50 TABLET ORAL
Status: CANCELLED | OUTPATIENT
Start: 2022-04-11

## 2022-04-11 RX ADMIN — BUPROPION HYDROCHLORIDE 300 MG: 150 TABLET, FILM COATED, EXTENDED RELEASE ORAL at 10:00

## 2022-04-11 RX ADMIN — Medication 3 MG: at 21:52

## 2022-04-11 RX ADMIN — DIVALPROEX SODIUM 2000 MG: 500 TABLET, EXTENDED RELEASE ORAL at 21:52

## 2022-04-11 RX ADMIN — Medication 1000 UNITS: at 10:00

## 2022-04-11 RX ADMIN — FLUOXETINE 80 MG: 20 CAPSULE ORAL at 10:00

## 2022-04-11 RX ADMIN — ASPIRIN 81 MG CHEWABLE TABLET 81 MG: 81 TABLET CHEWABLE at 10:00

## 2022-04-11 NOTE — ED NOTES
Call to [facility], spoke with [facility], who requested that we ask the patient if she is willing to be admitted, as she has refused placement there in the past      Message sent to Charge RN, Alida Schmitt, to see if the patient is willing to go       Anita Lara LMSW  04/10/22  4028

## 2022-04-11 NOTE — EMTALA/ACUTE CARE TRANSFER
[facility] EMERGENCY DEPARTMENT  [address]  [facility] [address]  Dept: [phone]      EMTALA TRANSFER CONSENT    NAME Leydi Frye                                         1970                              MRN [id number]    I have been informed of my rights regarding examination, treatment, and transfer   by Dr Jessi Miguel att  providers found    Benefits: Specialized equipment and/or services available at the receiving facility (Include comment)________________________    Risks: Potential for delay in receiving treatment      Consent for Transfer:  I acknowledge that my medical condition has been evaluated and explained to me by the emergency department physician or other qualified medical person and/or my attending physician, who has recommended that I be transferred to the service of  Accepting Physician: Dr Kimi Patterson at [facility] [address], [address] : [place]  The above potential benefits of such transfer, the potential risks associated with such transfer, and the probable risks of not being transferred have been explained to me, and I fully understand them  The doctor has explained that, in my case, the benefits of transfer outweigh the risks  I agree to be transferred  I authorize the performance of emergency medical procedures and treatments upon me in both transit and upon arrival at the receiving facility  Additionally, I authorize the release of any and all medical records to the receiving facility and request they be transported with me, if possible  I understand that the safest mode of transportation during a medical emergency is an ambulance and that the Hospital advocates the use of this mode of transport  Risks of traveling to the receiving facility by car, including absence of medical control, life sustaining equipment, such as oxygen, and medical personnel has been explained to me and I fully understand them      ([address] [place])  [  ] I consent to the stated transfer and to be transported by ambulance/helicopter  [  ]  I consent to the stated transfer, but refuse transportation by ambulance and accept full responsibility for my transportation by car  I understand the risks of non-ambulance transfers and I exonerate the Hospital and its staff from any deterioration in my condition that results from this refusal     X___________________________________________    DATE  22  TIME________  Signature of patient or legally responsible individual signing on patient behalf           RELATIONSHIP TO PATIENT_________________________          Provider Certification    NAME Leydi De La Fuente                                         1970                              MRN [id number]    A medical screening exam was performed on the above named patient  Based on the examination:    Condition Necessitating Transfer The primary encounter diagnosis was Depression with suicidal ideation  Diagnoses of Seizures ([facility] [address] ), Hyperlipidemia, Vertigo, and Primary hypertension were also pertinent to this visit      Patient Condition: The patient has been stabilized such that within reasonable medical probability, no material deterioration of the patient condition or the condition of the unborn child(courtney) is likely to result from the transfer    Reason for Transfer: Level of Care needed not available at this facility    Transfer Requirements: [address]   · Space available and qualified personnel available for treatment as acknowledged by [employer]  · Agreed to accept transfer and to provide appropriate medical treatment as acknowledged by       Dr Ean Carrillo  · Appropriate medical records of the examination and treatment of the patient are provided at the time of transfer   [address] _______  · Transfer will be performed by qualified personnel from [facility]  and appropriate transfer equipment as required, including the use of necessary and appropriate life support measures  Provider Certification: I have examined the patient and explained the following risks and benefits of being transferred/refusing transfer to the patient/family:         Based on these reasonable risks and benefits to the patient and/or the unborn child(courtney), and based upon the information available at the time of the patients examination, I certify that the medical benefits reasonably to be expected from the provision of appropriate medical treatments at another medical facility outweigh the increasing risks, if any, to the individuals medical condition, and in the case of labor to the unborn child, from effecting the transfer      X____________________________________________ DATE 04/11/22        TIME_______      ORIGINAL - SEND TO MEDICAL RECORDS   COPY - SEND WITH PATIENT DURING TRANSFER

## 2022-04-11 NOTE — ED NOTES
Patient left department at [phone]  Report attempted to be called x 2    No answer at [address] [facility]  04/11/22 3299

## 2022-04-11 NOTE — EMTALA/ACUTE CARE TRANSFER
[facility] EMERGENCY DEPARTMENT  [address]  [facility] [address]  Dept: [phone]      EMTALA TRANSFER CONSENT    NAME Leydi Frye                                         1970                              MRN [id number]    I have been informed of my rights regarding examination, treatment, and transfer   by Dr Jessi Miguel att  providers found    Benefits: Specialized equipment and/or services available at the receiving facility (Include comment)________________________    Risks: Potential for delay in receiving treatment      Consent for Transfer:  I acknowledge that my medical condition has been evaluated and explained to me by the emergency department physician or other qualified medical person and/or my attending physician, who has recommended that I be transferred to the service of  Accepting Physician: Dr Kimi Patterson at [facility] Name, [address] : Raphael Howell  The above potential benefits of such transfer, the potential risks associated with such transfer, and the probable risks of not being transferred have been explained to me, and I fully understand them  The doctor has explained that, in my case, the benefits of transfer outweigh the risks  I agree to be transferred  I authorize the performance of emergency medical procedures and treatments upon me in both transit and upon arrival at the receiving facility  Additionally, I authorize the release of any and all medical records to the receiving facility and request they be transported with me, if possible  I understand that the safest mode of transportation during a medical emergency is an ambulance and that the Hospital advocates the use of this mode of transport  Risks of traveling to the receiving facility by car, including absence of medical control, life sustaining equipment, such as oxygen, and medical personnel has been explained to me and I fully understand them      ([address] [place])  [  ] I consent to the stated transfer and to be transported by ambulance/helicopter  [  ]  I consent to the stated transfer, but refuse transportation by ambulance and accept full responsibility for my transportation by car  I understand the risks of non-ambulance transfers and I exonerate the Hospital and its staff from any deterioration in my condition that results from this refusal     X___________________________________________    DATE  22  TIME________  Signature of patient or legally responsible individual signing on patient behalf           RELATIONSHIP TO PATIENT_________________________          Provider Certification    NAME Leydi Gordillo                                         1970                              MRN [id number]    A medical screening exam was performed on the above named patient  Based on the examination:    Condition Necessitating Transfer The primary encounter diagnosis was Depression with suicidal ideation  Diagnoses of Seizures ([facility] [address] ), Hyperlipidemia, Vertigo, and Primary hypertension were also pertinent to this visit      Patient Condition: The patient has been stabilized such that within reasonable medical probability, no material deterioration of the patient condition or the condition of the unborn child(courtney) is likely to result from the transfer    Reason for Transfer: Level of Care needed not available at this facility    Transfer Requirements: [address]   · Space available and qualified personnel available for treatment as acknowledged by [employer]  · Agreed to accept transfer and to provide appropriate medical treatment as acknowledged by       Dr Hernesto Metzger  · Appropriate medical records of the examination and treatment of the patient are provided at the time of transfer   [address] _______  · Transfer will be performed by qualified personnel from [facility]  and appropriate transfer equipment as required, including the use of necessary and appropriate life support measures  Provider Certification: I have examined the patient and explained the following risks and benefits of being transferred/refusing transfer to the patient/family:         Based on these reasonable risks and benefits to the patient and/or the unborn child(courtney), and based upon the information available at the time of the patients examination, I certify that the medical benefits reasonably to be expected from the provision of appropriate medical treatments at another medical facility outweigh the increasing risks, if any, to the individuals medical condition, and in the case of labor to the unborn child, from effecting the transfer      X____________________________________________ DATE 04/11/22        TIME_______      ORIGINAL - SEND TO MEDICAL RECORDS   COPY - SEND WITH PATIENT DURING TRANSFER

## 2022-04-11 NOTE — ED NOTES
Called Amarilys Louis from [facility] to complete a coordination of benefits    Was transferred to Bernadine Wheatley who reported that she would enter

## 2022-04-11 NOTE — PLAN OF CARE
Problem: Alteration in Thoughts and Perception  Goal: Verbalize thoughts and feelings  Description: Interventions:  - Promote a nonjudgmental and trusting relationship with the patient through active listening and therapeutic communication  - Assess patient's level of functioning, behavior and potential for risk  - Engage patient in 1 on 1 interactions  - Encourage patient to express fears, feelings, frustrations, and discuss symptoms    - Scarbro patient to reality, help patient recognize reality-based thinking   - Administer medications as ordered and assess for potential side effects  - Provide the patient education related to the signs and symptoms of the illness and desired effects of prescribed medications  Outcome: Progressing     Problem: Ineffective Coping  Goal: Cooperates with admission process  Description: Interventions:   - Complete admission process  Outcome: Progressing

## 2022-04-11 NOTE — ED NOTES
Patient is accepted at [facility]  Patient is accepted by Dr Marina Thurman per [facility]  Transportation is arranged with [facility]  Transportation is scheduled for **  Patient may go to the floor at [phone]        Nurse report is to be called to [phone] prior to patient transfer

## 2022-04-11 NOTE — ED NOTES
Calls placed to:     [facility]: Per [place], they only have 1 adolecent male bed at this time  [facility]: Message left with admissions  [facility]: Per Vic, no beds  [facility]: Per Krishan Coleman, they have no beds  [facility]: Per Dalton Lama, no beds, and filled all Monday beds  [facility]: Per Viniciokathia Gottron, they have no beds  [facility]: Per Brian Gonzalez, they at [facility]: Per Rob Blevins, the requested a call back in the am    [facility]: No answer in admissions  [facility] Per Amy Marks, no beds until morning  [facility]: Per Bentley, they dont have beds, but expect d/c's in the morning  [facility]: Per Oanh Lowery, one adolescent male for bed on 4/12, and 1 transgender bed   [facility]: Message left on clinical assessment team v/m  Requested call back if beds are available  [facility]: Per Flor Downey, they expect adolescent beds in the am    [facility]: Per Ed, we are to call back around 10am   [facility]: Per Aretha Salvador, no beds until 4/12       Anita Lara, [facility]  04/11/22  [id number]

## 2022-04-12 LAB
25(OH)D3 SERPL-MCNC: 38.6 NG/ML (ref 30–100)
FOLATE SERPL-MCNC: 11.1 NG/ML (ref 3.1–17.5)
VIT B12 SERPL-MCNC: 756 PG/ML (ref 100–900)

## 2022-04-12 PROCEDURE — 82306 VITAMIN D 25 HYDROXY: CPT | Performed by: FAMILY MEDICINE

## 2022-04-12 PROCEDURE — 99223 1ST HOSP IP/OBS HIGH 75: CPT | Performed by: PSYCHIATRY & NEUROLOGY

## 2022-04-12 PROCEDURE — 82746 ASSAY OF FOLIC ACID SERUM: CPT | Performed by: FAMILY MEDICINE

## 2022-04-12 PROCEDURE — 82607 VITAMIN B-12: CPT | Performed by: FAMILY MEDICINE

## 2022-04-12 RX ORDER — IBUPROFEN 600 MG/1
600 TABLET ORAL EVERY 6 HOURS PRN
Status: DISCONTINUED | OUTPATIENT
Start: 2022-04-12 | End: 2022-04-19 | Stop reason: HOSPADM

## 2022-04-12 RX ORDER — NYSTATIN 100000 [USP'U]/G
POWDER TOPICAL 3 TIMES DAILY
Status: DISCONTINUED | OUTPATIENT
Start: 2022-04-12 | End: 2022-04-19 | Stop reason: HOSPADM

## 2022-04-12 RX ORDER — LEVOCARNITINE 1 G/10ML
330 SOLUTION ORAL DAILY
Status: DISCONTINUED | OUTPATIENT
Start: 2022-04-12 | End: 2022-04-12 | Stop reason: SDUPTHER

## 2022-04-12 RX ORDER — LISINOPRIL 20 MG/1
20 TABLET ORAL DAILY
Status: DISCONTINUED | OUTPATIENT
Start: 2022-04-12 | End: 2022-04-19 | Stop reason: HOSPADM

## 2022-04-12 RX ORDER — ASPIRIN 81 MG/1
81 TABLET, CHEWABLE ORAL DAILY
Status: DISCONTINUED | OUTPATIENT
Start: 2022-04-12 | End: 2022-04-19 | Stop reason: HOSPADM

## 2022-04-12 RX ADMIN — BUPROPION 150 MG: 150 TABLET, EXTENDED RELEASE ORAL at 08:56

## 2022-04-12 RX ADMIN — ZIPRASIDONE HCL 40 MG: 20 CAPSULE ORAL at 16:10

## 2022-04-12 RX ADMIN — ASPIRIN 81 MG 81 MG: 81 TABLET ORAL at 09:59

## 2022-04-12 RX ADMIN — Medication 1000 UNITS: at 08:56

## 2022-04-12 RX ADMIN — Medication 3 MG: at 22:03

## 2022-04-12 RX ADMIN — ZIPRASIDONE HCL 40 MG: 20 CAPSULE ORAL at 08:55

## 2022-04-12 RX ADMIN — ATORVASTATIN CALCIUM 40 MG: 40 TABLET, FILM COATED ORAL at 16:10

## 2022-04-12 RX ADMIN — LEVOCARNITINE 330 MG: 1 SOLUTION ORAL at 08:57

## 2022-04-12 RX ADMIN — DIVALPROEX SODIUM 2000 MG: 500 TABLET, EXTENDED RELEASE ORAL at 22:03

## 2022-04-12 RX ADMIN — FLUOXETINE 80 MG: 20 CAPSULE ORAL at 08:55

## 2022-04-12 RX ADMIN — LISINOPRIL 20 MG: 20 TABLET ORAL at 09:59

## 2022-04-12 NOTE — CASE MANAGEMENT
CM placed phone call to patient's Group Home: [phone] to provide opportunity for questions/concerns and obtain any information to further patient's treatment/progress  VM left with patient's care co-ordinator, Sanam  Will await return call  [facility] [phone], extension [phone], to speak with patient's therapist, José Miguel Mondragon  Message left via VM to notify provider of need to cancel OP therapy, tomorrow, 4/13/22 as patient is currently hospitalized  Will f/u as needed for aftercare arrangements  CM placed call to "[employer] returning CM call from [facility]  VM message left, providing return call number  Will await return call  No

## 2022-04-12 NOTE — PROGRESS NOTES
Patient belongings as follows:    Room:  Purple top  Galilea Galicia top  [employer] joggers    Room locked cabinet:  Blue top  Flowered dress  w/ zipper  Flowered dress w/ metal  Black pants w/ zippers  Galilea Galicia sneakers  Word search book  Pen    No wallet  No money  No cell phone  No dentures
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
## 2022-04-12 NOTE — TREATMENT PLAN
TREATMENT PLAN REVIEW - Dg [age] y o  1970 female MRN: [id number]    [address]  Room / Bed: [place] [address] Encounter: [id number]          Admit Date/Time:  4/11/2022  7:20 PM    Treatment Team: Attending Provider: Rachele Bolanos MD; Consulting Physician: Dexter De La Torre MD; Patient Care Technician: Zoila Aguirre; Recreational Therapist: Leanne Yee; Certified Nursing Assistant: Destinee Rausch; Certified Nursing Assistant: Verlene Peabody;  Care Manager: Sana Haddad, JOSÉ MIGUEL; Registered Nurse: Landon Garvey, JOSÉ MIGUEL; Patient Care Assistant: Otis Peters; Patient Care Assistant: Wai Pineda    Diagnosis: Principal Problem:    Bipolar affective disorder, depressed, severe, with psychotic behavior ([facility] )  Active Problems:    Primary hypertension    Hyperlipidemia    Class 3 severe obesity due to excess calories without serious comorbidity in [facility])    PTSD (post-traumatic stress disorder)    Borderline personality disorder ([facility] )    Seizures ([facility] )      Patient Strengths/Assets: compliant with medication, negotiates basic needs, patient is on a voluntary commitment    Patient Barriers/Limitations: difficulty adapting, limited education, poor insight    Short Term Goals: decrease in depressive symptoms, decrease in anxiety symptoms, decrease in psychotic symptoms, decrease in suicidal thoughts, ability to stay safe on the unit, improvement in insight, improvement in reasoning ability, improvement in self care, sleep improvement, improvement in appetite, mood stabilization, increase in group attendance, increase in socialization with peers on the unit, acceptance of need for psychiatric treatment, acceptance of psychiatric medications    Long Term Goals: improvement in depression, improvement in anxiety, stabilization of mood, free of suicidal thoughts, resolution of psychotic symptoms, improved insight, acceptance of need for psychiatric medications, acceptance of need for psychiatric treatment, acceptance of need for psychiatric follow up after discharge, adequate self care, adequate sleep, adequate appetite, adequate oral intake, appropriate interaction with peers, stable living arrangements upon discharge, establishment of family support upon discharge    Progress Towards Goals: starting psychiatric medications as prescribed    Recommended Treatment: medication management, patient medication education, group therapy, milieu therapy, continued Behavioral Health psychiatric evaluation/assessment process, medical follow up with medical team    Treatment Frequency: daily medication monitoring, group and milieu therapy daily, monitoring through interdisciplinary rounds, monitoring through weekly patient care conferences    Expected Discharge Date:  10 midnights     Discharge Plan: will be determined    Treatment Plan Created/Updated By: Cain oD MD

## 2022-04-12 NOTE — PROGRESS NOTES
04/12/22 1030   Activity/Group Checklist   Group Personal control   Attendance Attended   Attendance Duration (min) 46-60   Interactions Interacted appropriately   Affect/Mood Appropriate   Goals Achieved Identified feelings; Able to listen to others; Able to engage in interactions; Able to reflect/comment on own behavior;Able to self-disclose; Able to recieve feedback

## 2022-04-12 NOTE — PROGRESS NOTES
04/12/22 1439   Team Meeting   Initial Conference Date 04/12/22   Next Conference Date 05/12/22   Team Members Present   Team Members Present Physician;Nurse;   Physician Team Member Dr Angela Champagne Team Member Payton Killian RN   Care Management Team Member Keenan Medina RN   Patient/Family Present   Patient Present Yes   Patient's Family Present No     Patient and treatment team met and reviewed pt strengths, limitations, coping skills, treatment plan and goals; pt agreeable and signed; copy on chart

## 2022-04-12 NOTE — H&P
Beth jigl  JUL:[birth year] F  GQR:[id number]    KTO:[id number]  Adm Date: 4/11/2022 1920  7:20 PM   ATT PHY: Cherelle Peguero, [address] [facility]         Chief Complaint:  Worsening depression symptoms along with suicidal ideations    History of Presenting Illness: Sanjuanita Brownlee is a(n) 47y o  year old female who was admitted from emergency department [facility] where she presented with worsening depression symptoms along with suicidal ideations with a plan to walk in front of the moving cars  Patient examined at bedside  Patient denied any active suicidal homicidal ideations      Allergies   Allergen Reactions    Fish Oil - Food Allergy Other (See Comments)     unknown    Fish-Derived Products - Food Allergy Hives       Current Facility-Administered Medications on File Prior to Encounter   Medication Dose Route Frequency Provider Last Rate Last Admin    [DISCONTINUED] atorvastatin (LIPITOR) tablet 40 mg  40 mg Oral Daily With Samuel Costa PA-C        [DISCONTINUED] buPROPion (WELLBUTRIN XL) 24 hr tablet 300 mg  300 mg Oral Daily Nahum Costa PA-C   300 mg at 04/11/22 1000    [DISCONTINUED] cholecalciferol (VITAMIN D3) tablet 1,000 Units  1,000 Units Oral Daily Nahum Costa PA-C   1,000 Units at 04/11/22 1000    [DISCONTINUED] divalproex sodium (DEPAKOTE ER) 24 hr tablet 2,000 mg  2,000 mg Oral HS Nahum Costa PA-C   2,000 mg at 04/10/22 2216    [DISCONTINUED] FLUoxetine (PROzac) capsule 80 mg  80 mg Oral Daily Nahum Costa PA-C   80 mg at 04/11/22 1000    [DISCONTINUED] levOCARNitine (CARNITOR) oral solution 330 mg  330 mg Oral Daily Nahum Costa PA-C   330 mg at 04/10/22 2216    [DISCONTINUED] lisinopril (ZESTRIL) tablet 20 mg  20 mg Oral Once Soco Calvert PA-C        [DISCONTINUED] melatonin tablet 3 mg  3 mg Oral HS Nahum Costa PA-C   3 mg at 04/10/22 2216    [DISCONTINUED] nicotine (NICODERM CQ) 7 mg/24hr TD 24 hr patch 7 mg  7 mg Transdermal Once [employer], PA-C        [DISCONTINUED] polyethylene glycol (MIRALAX) packet 17 g  17 g Oral Daily Nahum Costa PA-C        [DISCONTINUED] ziprasidone (GEODON) capsule 40 mg  40 mg Oral BID With Meals Jacqueline Costa PA-C         Current Outpatient Medications on File Prior to Encounter   Medication Sig Dispense Refill    aspirin 81 mg chewable tablet Chew 1 tablet (81 mg total) daily 30 tablet 1    atorvastatin (LIPITOR) 40 mg tablet Take 1 tablet (40 mg total) by mouth daily with dinner 30 tablet 0    buPROPion (WELLBUTRIN XL) 300 mg 24 hr tablet Take 1 tablet (300 mg total) by mouth daily 30 tablet 1    cholecalciferol (VITAMIN D3) 1,000 units tablet Take 1 tablet (1,000 Units total) by mouth daily 30 tablet 0    divalproex sodium (DEPAKOTE ER) 500 mg 24 hr tablet Take 4 tablets (2,000 mg total) by mouth daily at bedtime 120 tablet 1    FLUoxetine (PROzac) 40 MG capsule Take 2 capsules (80 mg total) by mouth daily 60 capsule 1    hydrOXYzine pamoate (VISTARIL) 50 mg capsule Take 50 mg by mouth 3 (three) times a day as needed for itching      levOCARNitine (CARNITOR) 330 MG tablet Take 330 mg by mouth in the morning      lisinopril (ZESTRIL) 20 mg tablet Take 1 tablet (20 mg total) by mouth daily 30 tablet 0    melatonin 3 mg Take 1 tablet (3 mg total) by mouth daily at bedtime 30 tablet 1    ziprasidone (GEODON) 40 mg capsule Take 1 capsule (40 mg total) by mouth 2 (two) times a day with meals 60 capsule 1       Active Ambulatory Problems     Diagnosis Date Noted    Primary hypertension 08/09/2016    Hyperlipidemia 08/09/2016    Class 3 severe obesity due to excess calories without serious comorbidity in adult [facility]) 01/09/2021    Chronic midline low back pain without sciatica 01/09/2021    Bacterial conjunctivitis of both eyes 01/09/2021    PTSD (post-traumatic stress disorder) 01/09/2021    Vertigo 01/22/2021  Bipolar affective disorder, depressed, severe, with psychotic behavior ([place] [address] ) 10/10/2021    Closed fracture of base of fifth metatarsal bone 11/14/2021    Borderline personality disorder ([place] [address] )     Seizures ([place] [address] ) 12/06/2021    Left leg pain 12/18/2021     Resolved Ambulatory Problems     Diagnosis Date Noted    Medical clearance for psychiatric admission 01/09/2021    Mood disorder ([place] [address] ) 11/29/2020     Past Medical History:   Diagnosis Date    Anxiety     Bipolar 1 disorder ([place] [address] )     Bipolar affective disorder, depressed, severe ([place] [address] ) 10/10/2021    CAD (coronary artery disease)     Cognitive impairment     Depression     Dyslipidemia     GERD (gastroesophageal reflux disease)     Hypertension     Obesity     Psychiatric disorder     Schizoaffective disorder ([place] [address] )     Seizure ([place] [address] )        Past Surgical History:   Procedure Laterality Date    APPENDECTOMY      PATIENT DENIES HAVING APPENDIX REMOVED    CHOLECYSTECTOMY      FOOT SURGERY Right        Social History:   Social History     Socioeconomic History    Marital status: Single     Spouse name: None    Number of children: None    Years of education: None    Highest education level: None   Occupational History    None   Tobacco Use    Smoking status: Never Smoker    Smokeless tobacco: Never Used    Tobacco comment: Pt stated "smokes when stressed"   Vaping Use    Vaping Use: Never used   Substance and Sexual Activity    Alcohol use: Not Currently    Drug use: Not Currently    Sexual activity: Yes     Partners: Male   Other Topics Concern    None   Social History Narrative    None     Social Determinants of Health     Financial Resource Strain: Not on file   Food Insecurity: Not on file   Transportation Needs: Not on file   Physical Activity: Not on file   Stress: Not on file   Social Connections: Not on file   Intimate Partner Violence: Not on file   Housing Stability: Not on file       Family History:   Family History   Problem Relation Age of Onset    Kidney cancer Mother     Cerebral aneurysm Father        Review of Systems   Musculoskeletal: Positive for arthralgias, back pain and gait problem  Skin: Positive for rash  Neurological: Positive for headaches  All other systems reviewed and are negative  Physical Exam   Vitals: Blood pressure 159/70, pulse 89, temperature (!) 97 °F (36 1 °C), temperature source Temporal, resp  rate 16, height 5' 6" (1 676 m), weight 118 kg (260 lb 5 8 oz), SpO2 95 %  ,Body mass index is 42 02 kg/m²  Constitutional: Awake and Alert  Well-developed and well-nourished  No distress  HENT: PERR,EOMI, conjunctiva normal  Head: Normocephalic and atraumatic  Mouth/Throat: Oropharynx is clear and moist     Eyes: Conjunctivae and EOM are normal  Pupils are equal, round, and reactive to light  Right and left eye exhibits no discharge  Neck: Neck supple  No tracheal deviation present  No thyromegaly present  Cardiovascular: Normal rate, regular rhythm and normal heart sounds  Exam reveals no friction rub  No murmur heard  Pulmonary/Chest: Effort normal and breath sounds normal  No respiratory distress  She has no wheezes  Abdominal: Soft  Bowel sounds are normal  She exhibits no distension  There is no tenderness  There is no rebound and no guarding  Neurological: Cranial Nerves grossly intact  No sensory deficit  Coordination normal    Musculoskeletal:   Nontender spine  Skin: Skin is warm and dry  No rash noted  No diaphoresis  No erythema  No edema  No cyanosis  Tyler Torres is a(n) 47y o  year old female with bipolar disorder with depression    1  Cardiac with history of hypertension and dyslipidemia  Patient is on lisinopril 20 mg daily, atorvastatin 40 mg daily and aspirin 81 mg daily  2  Carnitine deficiency  Patient is on carnitine supplement levocarnitine 330 mg daily  3  Migraine headaches  Patient may get ibuprofen on as-needed basis  4  Seizure disorder  Patient is on Depakote ER 2000 mg at bedtime  5  Constipation  Patient is on MiraLax 17 g daily  6  DJD/osteoarthritis with lower back pain  Patient may continue to get Tylenol and ibuprofen p r n  7  Intertrigo involving the skin folds and groin  Patient has been put on nystatin powder 3 times daily along with dry gauze to keep the area dry  8  Psych with bipolar disorder with depression  Patient is being managed by psych  Prognosis: Fair  Discharge Plan: In progress  Advanced Directives: I have discussed in detail the patient the advanced directives  Patient has not appointed anybody as her POA and has no living will with advanced healthcare directives  Patient's 1st contact is her aunt Jose Alfredo Yusuf and her phone number is [phone]  When discussing cardiac and pulmonary resuscitation efforts with the patient, the patient wishes to be FULL CODE  I have spent more than 50 minutes gathering data, doing physical examination, and discussing the advanced directives, which was witnessed by caring staff

## 2022-04-12 NOTE — PLAN OF CARE
Problem: Ineffective Coping  Goal: Participates in unit activities  Description: Interventions:  - Provide therapeutic environment   - Provide required programming   - Redirect inappropriate behaviors   Outcome: Progressing        Group Goals: Daily Expectations/ Individual Goals/ Better Sleep Techniques  Attendance: 2/2  Participation: openly shares and remains engaged in group topics  Mood/ Affect: appropriate  Behaviors/ Redirection: n/a

## 2022-04-12 NOTE — PLAN OF CARE
Problem: Alteration in Thoughts and Perception  Goal: Treatment Goal: Gain control of psychotic behaviors/thinking, reduce/eliminate presenting symptoms and demonstrate improved reality functioning upon discharge  Outcome: Progressing  Goal: Verbalize thoughts and feelings  Description: Interventions:  - Promote a nonjudgmental and trusting relationship with the patient through active listening and therapeutic communication  - Assess patient's level of functioning, behavior and potential for risk  - Engage patient in 1 on 1 interactions  - Encourage patient to express fears, feelings, frustrations, and discuss symptoms    - [place] patient to reality, help patient recognize reality-based thinking   - Administer medications as ordered and assess for potential side effects  - Provide the patient education related to the signs and symptoms of the illness and desired effects of prescribed medications  Outcome: Progressing  Goal: Refrain from acting on delusional thinking/internal stimuli  Description: Interventions:  - Monitor patient closely, per order   - Utilize least restrictive measures   - Set reasonable limits, give positive feedback for acceptable   - Administer medications as ordered and monitor of potential side effects  Outcome: Progressing  Goal: Agree to be compliant with medication regime, as prescribed and report medication side effects  Description: Interventions:  - Offer appropriate PRN medication and supervise ingestion; conduct AIMS, as needed   Outcome: Progressing  Goal: Attend and participate in unit activities, including therapeutic, recreational, and educational groups  Description: Interventions:  -Encourage Visitation and family involvement in care  Outcome: Progressing  Goal: Recognize dysfunctional thoughts, communicate reality-based thoughts at the time of discharge  Description: Interventions:  - Provide medication and psycho-education to assist patient in compliance and developing insight into his/her illness   Outcome: Progressing  Goal: Complete daily ADLs, including personal hygiene independently, as able  Description: Interventions:  - Observe, teach, and assist patient with ADLS  - Monitor and promote a balance of rest/activity, with adequate nutrition and elimination   Outcome: Progressing     Problem: Ineffective Coping  Goal: Cooperates with admission process  Description: Interventions:   - Complete admission process  Outcome: Progressing  Goal: Identifies ineffective coping skills  Outcome: Progressing  Goal: Identifies healthy coping skills  Outcome: Progressing  Goal: Demonstrates healthy coping skills  Outcome: Progressing  Goal: Participates in unit activities  Description: Interventions:  - Provide therapeutic environment   - Provide required programming   - Redirect inappropriate behaviors   Outcome: Progressing  Goal: Patient/Family participate in treatment and DC plans  Description: Interventions:  - Provide therapeutic environment  Outcome: Progressing  Goal: Patient/Family verbalizes awareness of resources  Outcome: Progressing  Goal: Understands least restrictive measures  Description: Interventions:  - Utilize least restrictive behavior  Outcome: Progressing  Goal: Free from restraint events  Description: - Utilize least restrictive measures   - Provide behavioral interventions   - Redirect inappropriate behaviors   Outcome: Progressing     Problem: Risk for Self Injury/Neglect  Goal: Treatment Goal: Remain safe during length of stay, learn and adopt new coping skills, and be free of self-injurious ideation, impulses and acts at the time of discharge  Outcome: Progressing  Goal: Verbalize thoughts and feelings  Description: Interventions:  - Assess and re-assess patient's lethality and potential for self-injury  - Engage patient in 1:1 interactions, daily, for a minimum of 15 minutes  - Encourage patient to express feelings, fears, frustrations, hopes  - Establish rapport/trust with patient   Outcome: Progressing  Goal: Refrain from harming self  Description: Interventions:  - Monitor patient closely, per order  - Develop a trusting relationship  - Supervise medication ingestion, monitor effects and side effects   Outcome: Progressing  Goal: Attend and participate in unit activities, including therapeutic, recreational, and educational groups  Description: Interventions:  - Provide therapeutic and educational activities daily, encourage attendance and participation, and document same in the medical record  - Obtain collateral information, encourage visitation and family involvement in care   Outcome: Progressing  Goal: Recognize maladaptive responses and adopt new coping mechanisms  Outcome: Progressing  Goal: Complete daily ADLs, including personal hygiene independently, as able  Description: Interventions:  - Observe, teach, and assist patient with ADLS  - Monitor and promote a balance of rest/activity, with adequate nutrition and elimination  Outcome: Progressing     Problem: Depression  Goal: Treatment Goal: Demonstrate behavioral control of depressive symptoms, verbalize feelings of improved mood/affect, and adopt new coping skills prior to discharge  Outcome: Progressing  Goal: Verbalize thoughts and feelings  Description: Interventions:  - Assess and re-assess patient's level of risk   - Engage patient in 1:1 interactions, daily, for a minimum of 15 minutes   - Encourage patient to express feelings, fears, frustrations, hopes   Outcome: Progressing  Goal: Refrain from harming self  Description: Interventions:  - Monitor patient closely, per order   - Supervise medication ingestion, monitor effects and side effects   Outcome: Progressing  Goal: Refrain from isolation  Description: Interventions:  - Develop a trusting relationship   - Encourage socialization   Outcome: Progressing  Goal: Refrain from self-neglect  Outcome: Progressing  Goal: Attend and participate in unit activities, including therapeutic, recreational, and educational groups  Description: Interventions:  - Provide therapeutic and educational activities daily, encourage attendance and participation, and document same in the medical record   Outcome: Progressing  Goal: Complete daily ADLs, including personal hygiene independently, as able  Description: Interventions:  - Observe, teach, and assist patient with ADLS  -  Monitor and promote a balance of rest/activity, with adequate nutrition and elimination   Outcome: Progressing     Problem: Anxiety  Goal: Anxiety is at manageable level  Description: Interventions:  - Assess and monitor patient's anxiety level  - Monitor for signs and symptoms (heart palpitations, chest pain, shortness of breath, headaches, nausea, feeling jumpy, restlessness, irritable, apprehensive)  - Collaborate with interdisciplinary team and initiate plan and interventions as ordered    - [place] patient to unit/surroundings  - Explain treatment plan  - Encourage participation in care  - Encourage verbalization of concerns/fears  - Identify coping mechanisms  - Assist in developing anxiety-reducing skills  - Administer/offer alternative therapies  - Limit or eliminate stimulants  Outcome: Progressing
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
## 2022-04-12 NOTE — PROGRESS NOTES
04/12/22   Team Meeting   Meeting Type Daily Rounds   Team Members Present   Team Members Present Physician;Nurse;;; Occupational Therapist   Physician Team Member Dr Alicia Hernandez MD   Nursing Team Member Jv Packer, RN   Care Management Team Member Sarah Gibson MS, [facility]   Social Work Team Member Kia Narvaez [place]   OT Team Member Kostas Nichols [place]   Patient/Family Present   Patient Present No   Patient's Family Present No     NEW - 13; group home; SI with plan; stressor: argument with SO; cut forearm with thumbtack; on unit: endorses SI - contracts; AH - "boyfriend doesn't love me";   Hx cutting; slept; med compliant; blood in stool
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
## 2022-04-12 NOTE — ED NOTES
Insurance Authorization for admission:   Phone call placed to [facility] [place] [place]  Phone number: [phone]  Spoke to [place]      5 days approved  Level of care: YAJAIRA bills   Review on 4/15  Authorization #- [employer] [id number]

## 2022-04-12 NOTE — PROGRESS NOTES
04/12/22 0730   Activity/Group Checklist   Group Community meeting   Attendance Attended   Attendance Duration (min) 46-60   Interactions Interacted appropriately   Affect/Mood Appropriate   Goals Achieved Identified feelings; Able to listen to others; Able to engage in interactions; Able to self-disclose; Able to recieve feedback

## 2022-04-12 NOTE — NURSING NOTE
Pt arrived on the unit transported by EMS  Pt ambulated onto the unit and was pleasant and cooperative through the admission process  Pt reported feeling suicidal prior to admission and hearing voices telling her that her boyfriend from her group home was breaking up with her  Pt reported this made her suicidal and she used a thumbtack to cut her forearm  Pt reported high depression and low anxiety  Pt during skin assessment had excoriation to the jerry and inguinal flaps  Pt does report vertigo and will be observed when ambulating  Pt did report current auditory hallucinations about her boyfriend presently  No visual hallucinations  Pt showered and was assisted by MHTs  Pt did state if she becomes suicidal that she'd contact RN  Pt is coherent and alert and oriented x4  Continuous visual safety checks performed throughout the shift  Safety precautions maintained  Will continue to monitor

## 2022-04-13 PROCEDURE — 99232 SBSQ HOSP IP/OBS MODERATE 35: CPT | Performed by: PSYCHIATRY & NEUROLOGY

## 2022-04-13 RX ADMIN — ZIPRASIDONE HCL 40 MG: 20 CAPSULE ORAL at 16:22

## 2022-04-13 RX ADMIN — ZIPRASIDONE HCL 40 MG: 20 CAPSULE ORAL at 08:47

## 2022-04-13 RX ADMIN — Medication 3 MG: at 21:02

## 2022-04-13 RX ADMIN — NYSTATIN 1 APPLICATION: 100000 POWDER TOPICAL at 21:03

## 2022-04-13 RX ADMIN — Medication 1000 UNITS: at 08:46

## 2022-04-13 RX ADMIN — DIVALPROEX SODIUM 2000 MG: 500 TABLET, EXTENDED RELEASE ORAL at 21:02

## 2022-04-13 RX ADMIN — LEVOCARNITINE 330 MG: 1 SOLUTION ORAL at 08:50

## 2022-04-13 RX ADMIN — ASPIRIN 81 MG 81 MG: 81 TABLET ORAL at 08:46

## 2022-04-13 RX ADMIN — ATORVASTATIN CALCIUM 40 MG: 40 TABLET, FILM COATED ORAL at 16:22

## 2022-04-13 RX ADMIN — FLUOXETINE 80 MG: 20 CAPSULE ORAL at 08:46

## 2022-04-13 RX ADMIN — LISINOPRIL 20 MG: 20 TABLET ORAL at 08:47

## 2022-04-13 NOTE — NURSING NOTE
Complaint with evening snack and HS medications  Out in community and positive for snack  No acute behaviors or self- harm noted  Denied any suicidal ideations or hallucinations this evening  Retired to bed early  Refused Mycostatin Powder at HS  Took medications one at time  Appeared to make self choke after this with no emesis  Returned to sleep without further incidents

## 2022-04-13 NOTE — PROGRESS NOTES
Progress Note - Brandy Bernabe 47 y o  female MRN: [id number]    Unit/Bed#[facility] 205-02 Encounter: [id number]        Subjective:   Patient seen and examined at bedside after reviewing the chart and discussing the case with the caring staff  Patient examined at bedside  Patient has no acute concerns  Patient's labs from 04/12/2022 show vitamin B12 at 756 pg/mL, vitamin-D at 38 6 ng/mL, folate 11 1 ng/mL  Physical Exam   Vitals: Blood pressure 125/68, pulse 76, temperature 97 6 °F (36 4 °C), temperature source Temporal, resp  rate 18, height 5' 6" (1 676 m), weight 118 kg (260 lb 5 8 oz), SpO2 95 %  ,Body mass index is 42 02 kg/m²  Constitutional: Patient appears well-developed  HEENT: PERR, EOMI, MMM  Cardiovascular: Normal rate and regular rhythm  Pulmonary/Chest: Effort normal and breath sounds normal    Abdomen: Soft, + BS, NT    Assessment/Plan:  Brandy Bernabe is a(n) 47y o  year old female with bipolar disorder with depression      1  Cardiac with history of hypertension and dyslipidemia  Patient is on lisinopril 20 mg daily, atorvastatin 40 mg daily and aspirin 81 mg daily  2  Carnitine deficiency  Patient is on levocarnitine 330 mg daily  3  Migraine headaches  Patient may get Tylenol/ibuprofen on as-needed basis  4  Seizure disorder  Patient is on Depakote ER 2000 mg at bedtime  5  Constipation  Patient is on MiraLax 17 g daily  6  DJD/osteoarthritis with lower back pain  Patient may continue to get Tylenol and ibuprofen p r n  7  Intertrigo involving the skin folds and groin  Patient has been put on nystatin powder 3 times daily along with dry gauze to keep the area dry  The patient was discussed with Dr Leydi Loaiza and he is in agreement with the above note  8  Vitamin-D deficiency  Patient is on vitamin D3 1000 units daily  The patient was discussed with Dr Leydi Loaiza and he is in agreement with the above note

## 2022-04-13 NOTE — PROGRESS NOTES
04/13/22 0730   Activity/Group Checklist   Group Community meeting   Attendance Attended   Attendance Duration (min) 46-60   Interactions Interacted appropriately   Affect/Mood Appropriate   Goals Achieved Identified feelings; Able to listen to others; Able to engage in interactions; Able to self-disclose; Able to recieve feedback

## 2022-04-13 NOTE — PROGRESS NOTES
04/13/22   Team Meeting   Meeting Type Daily Rounds   Team Members Present   Team Members Present Physician;Nurse;;; Occupational Therapist   Physician Team Member Dr Bry Quintero MD   Nursing Team Member Becky Rosales RN   Care Management Team Member Cindy Newman, [facility]   Social Work Team Member Shannan Holley [place]   OT Team Member Owen Manzano [place]   Patient/Family Present   Patient Present No   Patient's Family Present No     Scratches self; borderline/bipolar; denies all psych symptoms; spoke to SO last night; happy this morning; slept last night; med compliant; lives at [address] [place] [address]

## 2022-04-13 NOTE — PLAN OF CARE
Problem: Alteration in Thoughts and Perception  Goal: Treatment Goal: Gain control of psychotic behaviors/thinking, reduce/eliminate presenting symptoms and demonstrate improved reality functioning upon discharge  Outcome: Progressing  Goal: Verbalize thoughts and feelings  Description: Interventions:  - Promote a nonjudgmental and trusting relationship with the patient through active listening and therapeutic communication  - Assess patient's level of functioning, behavior and potential for risk  - Engage patient in 1 on 1 interactions  - Encourage patient to express fears, feelings, frustrations, and discuss symptoms    - [place] patient to reality, help patient recognize reality-based thinking   - Administer medications as ordered and assess for potential side effects  - Provide the patient education related to the signs and symptoms of the illness and desired effects of prescribed medications  Outcome: Progressing  Goal: Refrain from acting on delusional thinking/internal stimuli  Description: Interventions:  - Monitor patient closely, per order   - Utilize least restrictive measures   - Set reasonable limits, give positive feedback for acceptable   - Administer medications as ordered and monitor of potential side effects  Outcome: Progressing  Goal: Agree to be compliant with medication regime, as prescribed and report medication side effects  Description: Interventions:  - Offer appropriate PRN medication and supervise ingestion; conduct AIMS, as needed   Outcome: Progressing  Goal: Attend and participate in unit activities, including therapeutic, recreational, and educational groups  Description: Interventions:  -Encourage Visitation and family involvement in care  Outcome: Progressing  Goal: Recognize dysfunctional thoughts, communicate reality-based thoughts at the time of discharge  Description: Interventions:  - Provide medication and psycho-education to assist patient in compliance and developing insight into his/her illness   Outcome: Progressing  Goal: Complete daily ADLs, including personal hygiene independently, as able  Description: Interventions:  - Observe, teach, and assist patient with ADLS  - Monitor and promote a balance of rest/activity, with adequate nutrition and elimination   Outcome: Progressing     Problem: Ineffective Coping  Goal: Cooperates with admission process  Description: Interventions:   - Complete admission process  Outcome: Progressing  Goal: Identifies ineffective coping skills  Outcome: Progressing  Goal: Identifies healthy coping skills  Outcome: Progressing  Goal: Demonstrates healthy coping skills  Outcome: Progressing  Goal: Participates in unit activities  Description: Interventions:  - Provide therapeutic environment   - Provide required programming   - Redirect inappropriate behaviors   Outcome: Progressing  Goal: Patient/Family participate in treatment and DC plans  Description: Interventions:  - Provide therapeutic environment  Outcome: Progressing  Goal: Patient/Family verbalizes awareness of resources  Outcome: Progressing  Goal: Understands least restrictive measures  Description: Interventions:  - Utilize least restrictive behavior  Outcome: Progressing  Goal: Free from restraint events  Description: - Utilize least restrictive measures   - Provide behavioral interventions   - Redirect inappropriate behaviors   Outcome: Progressing     Problem: Risk for Self Injury/Neglect  Goal: Treatment Goal: Remain safe during length of stay, learn and adopt new coping skills, and be free of self-injurious ideation, impulses and acts at the time of discharge  Outcome: Progressing  Goal: Verbalize thoughts and feelings  Description: Interventions:  - Assess and re-assess patient's lethality and potential for self-injury  - Engage patient in 1:1 interactions, daily, for a minimum of 15 minutes  - Encourage patient to express feelings, fears, frustrations, hopes  - Establish rapport/trust with patient   Outcome: Progressing  Goal: Refrain from harming self  Description: Interventions:  - Monitor patient closely, per order  - Develop a trusting relationship  - Supervise medication ingestion, monitor effects and side effects   Outcome: Progressing  Goal: Attend and participate in unit activities, including therapeutic, recreational, and educational groups  Description: Interventions:  - Provide therapeutic and educational activities daily, encourage attendance and participation, and document same in the medical record  - Obtain collateral information, encourage visitation and family involvement in care   Outcome: Progressing  Goal: Recognize maladaptive responses and adopt new coping mechanisms  Outcome: Progressing  Goal: Complete daily ADLs, including personal hygiene independently, as able  Description: Interventions:  - Observe, teach, and assist patient with ADLS  - Monitor and promote a balance of rest/activity, with adequate nutrition and elimination  Outcome: Progressing     Problem: Depression  Goal: Treatment Goal: Demonstrate behavioral control of depressive symptoms, verbalize feelings of improved mood/affect, and adopt new coping skills prior to discharge  Outcome: Progressing  Goal: Verbalize thoughts and feelings  Description: Interventions:  - Assess and re-assess patient's level of risk   - Engage patient in 1:1 interactions, daily, for a minimum of 15 minutes   - Encourage patient to express feelings, fears, frustrations, hopes   Outcome: Progressing  Goal: Refrain from harming self  Description: Interventions:  - Monitor patient closely, per order   - Supervise medication ingestion, monitor effects and side effects   Outcome: Progressing  Goal: Refrain from isolation  Description: Interventions:  - Develop a trusting relationship   - Encourage socialization   Outcome: Progressing  Goal: Refrain from self-neglect  Outcome: Progressing  Goal: Attend and participate in unit activities, including therapeutic, recreational, and educational groups  Description: Interventions:  - Provide therapeutic and educational activities daily, encourage attendance and participation, and document same in the medical record   Outcome: Progressing  Goal: Complete daily ADLs, including personal hygiene independently, as able  Description: Interventions:  - Observe, teach, and assist patient with ADLS  -  Monitor and promote a balance of rest/activity, with adequate nutrition and elimination   Outcome: Progressing     Problem: Anxiety  Goal: Anxiety is at manageable level  Description: Interventions:  - Assess and monitor patient's anxiety level  - Monitor for signs and symptoms (heart palpitations, chest pain, shortness of breath, headaches, nausea, feeling jumpy, restlessness, irritable, apprehensive)  - Collaborate with interdisciplinary team and initiate plan and interventions as ordered    - [place] patient to unit/surroundings  - Explain treatment plan  - Encourage participation in care  - Encourage verbalization of concerns/fears  - Identify coping mechanisms  - Assist in developing anxiety-reducing skills  - Administer/offer alternative therapies  - Limit or eliminate stimulants  Outcome: Progressing

## 2022-04-13 NOTE — NURSING NOTE
Patient observed sleeping during most Q 7 minute safety checks  No SI/HI/AH/VH noted  No self abuse noted overnight  Patient shows no s/s of distress  No complaints of pain or aspiration risks  Non-labored breathing  Monitoring continues  Fluids at bedside to promote hydration

## 2022-04-13 NOTE — PLAN OF CARE
Problem: Ineffective Coping  Goal: Participates in unit activities  Description: Interventions:  - Provide therapeutic environment   - Provide required programming   - Redirect inappropriate behaviors   Outcome: Progressing     Group Goals: Daily Expectations/ Individual Goals/ Anger Management   Attendance: 2/2  Participation: openly shares and remains engaged, pt can be child like in her responses  Mood Affect: appropriate   Behaviors/ Redirection: n/a

## 2022-04-13 NOTE — PROGRESS NOTES
04/13/22 1130   Activity/Group Checklist   Group Admission/Discharge   Attendance Attended   Attendance Duration (min) 16-30   Interactions Interacted appropriately   Affect/Mood Appropriate   Goals Achieved Identified feelings; Identified triggers; Identified relapse prevention strategies; Discussed coping strategies; Discussed discharge plans; Identified resources and support systems; Able to listen to others; Able to engage in interactions; Able to reflect/comment on own behavior;Verbalized increased hopefulness; Able to self-disclose; Able to recieve feedback       Patient also completed DERS with therapist   She spoke of her lack of control with cutting since she was 17  At 18 her parent's  and she has used cutting to release her pain since  She is feeling better since speaking with her boyfriend last night  She is hoping to be able to discharge by Friday  Patient requested word searches from RT, these were provided to patient

## 2022-04-13 NOTE — PROGRESS NOTES
Progress Note - Behavioral Health   Leydi Khan 47 y o  female MRN: [id number]  Unit/Bed#: [facility] 205-02 Encounter: [id number]  This note was not shared with the patient due to reasonable likelihood of causing patient harm     Assessment/Plan   Principal Problem:    Bipolar affective disorder, depressed, severe, with psychotic behavior ([facility] )  Active Problems:    Primary hypertension    Hyperlipidemia    Class 3 severe obesity due to excess calories without serious comorbidity in [facility])    PTSD (post-traumatic stress disorder)    Borderline personality disorder ([facility] )    Seizures ([facility] )    Behavior over the last 24 hours:  Minimal improvement    Sleep: slept better  Appetite: normal  Medication side effects: No  ROS: no complaints and all other systems are negative    Mental Status Evaluation:  Appearance:  age appropriate   Behavior:  cooperative   Speech:  normal volume   Mood:  euthymic   Affect:  mood-congruent   Thought Process:  concrete and goal directed   Thought Content:  no overt delusions   Perceptual Disturbances: denies at present   Risk Potential: Suicidal Ideations not at present  Homicidal Ideations none  Potential for Aggression No   Sensorium:  person, place and time/date   Memory:  recent and remote memory grossly intact   Consciousness:  alert and awake    Attention: attention span and concentration were age appropriate   Insight:  limited   Judgment: limited   Gait/Station: normal gait/station   Motor Activity: no abnormal movements     Progress Toward Goals: Patient reports reduced depressed mood, anxiety after she spoke with her SO yesterday  Still remains poor impulse control  She tolerated well taper off Wellbutrin since she has reported history of seizure  Calorie intake and medication compliance have been appropriate  Recommended Treatment: Continue with group therapy, milieu therapy and occupational therapy        Risks, benefits and possible side effects of Medications:   Patient does not verbalize understanding at this time and will require further explanation  Medications: all current active meds have been reviewed  Labs: I have personally reviewed all pertinent laboratory/tests results  Most Recent Labs:   Lab Results   Component Value Date    WBC 6 83 04/11/2022    RBC 3 94 04/11/2022    HGB 12 6 04/11/2022    HCT 38 0 04/11/2022     04/11/2022    RDW 13 8 04/11/2022    NEUTROABS 4 28 04/11/2022    SODIUM 132 (L) 04/11/2022    K 4 4 04/11/2022    CL 98 (L) 04/11/2022    CO2 29 04/11/2022    BUN 14 04/11/2022    CREATININE 0 70 04/11/2022    GLUC 106 04/11/2022    GLUF 80 12/31/2021    CALCIUM 8 8 04/11/2022    AST 16 04/11/2022    ALT 21 04/11/2022    ALKPHOS 73 04/11/2022    TP 7 3 04/11/2022    ALB 3 3 (L) 04/11/2022    TBILI 0 60 04/11/2022    CHOLESTEROL 160 02/13/2022    HDL 38 (L) 02/13/2022    TRIG 157 (H) 02/13/2022    LDLCALC 91 02/13/2022    NONHDLC 122 02/13/2022    VALPROICTOT 65 03/25/2022    YTK9JOEXIMNA 1 585 02/13/2022    PREGSERUM Negative 12/04/2021    RPR Non-Reactive 02/13/2022    HGBA1C 4 8 12/31/2021    EAG 91 12/31/2021       Counseling / Coordination of Care  Total floor / unit time spent today 20 minutes  Greater than 50% of total time was spent with the patient and / or family counseling and / or coordination of care

## 2022-04-13 NOTE — NURSING NOTE
Patient visible in milieu, pleasant and cooperative in interaction  Social with staff and peers  Patient denies anxiety/depression, SI/HI, hallucinations  Stated she felt better after speaking to boyfriend last night  Denies thoughts of self harm  Remains medication compliant and on 7" checks for safety and behaviors

## 2022-04-13 NOTE — PROGRESS NOTES
04/13/22 1030   Activity/Group Checklist   Group Anger management   Attendance Attended   Attendance Duration (min) 46-60   Interactions Interacted appropriately   Affect/Mood Appropriate   Goals Achieved Identified feelings; Discussed coping strategies; Able to listen to others; Able to engage in interactions; Able to reflect/comment on own behavior;Able to self-disclose; Able to recieve feedback

## 2022-04-14 LAB — VALPROATE SERPL-MCNC: 92 UG/ML (ref 50–100)

## 2022-04-14 PROCEDURE — 99232 SBSQ HOSP IP/OBS MODERATE 35: CPT | Performed by: NURSE PRACTITIONER

## 2022-04-14 PROCEDURE — 80164 ASSAY DIPROPYLACETIC ACD TOT: CPT | Performed by: PSYCHIATRY & NEUROLOGY

## 2022-04-14 RX ORDER — AMOXICILLIN 250 MG
1 CAPSULE ORAL 2 TIMES DAILY
Status: DISCONTINUED | OUTPATIENT
Start: 2022-04-14 | End: 2022-04-19 | Stop reason: HOSPADM

## 2022-04-14 RX ADMIN — POLYETHYLENE GLYCOL 3350 17 G: 17 POWDER, FOR SOLUTION ORAL at 08:36

## 2022-04-14 RX ADMIN — ATORVASTATIN CALCIUM 40 MG: 40 TABLET, FILM COATED ORAL at 16:30

## 2022-04-14 RX ADMIN — SENNOSIDES AND DOCUSATE SODIUM 1 TABLET: 50; 8.6 TABLET ORAL at 17:24

## 2022-04-14 RX ADMIN — DIVALPROEX SODIUM 2000 MG: 500 TABLET, EXTENDED RELEASE ORAL at 21:01

## 2022-04-14 RX ADMIN — FLUOXETINE 80 MG: 20 CAPSULE ORAL at 08:38

## 2022-04-14 RX ADMIN — LEVOCARNITINE 330 MG: 1 SOLUTION ORAL at 08:41

## 2022-04-14 RX ADMIN — ASPIRIN 81 MG 81 MG: 81 TABLET ORAL at 08:40

## 2022-04-14 RX ADMIN — SENNOSIDES AND DOCUSATE SODIUM 1 TABLET: 50; 8.6 TABLET ORAL at 12:00

## 2022-04-14 RX ADMIN — Medication 1000 UNITS: at 08:40

## 2022-04-14 RX ADMIN — Medication 3 MG: at 21:02

## 2022-04-14 RX ADMIN — ZIPRASIDONE HCL 40 MG: 20 CAPSULE ORAL at 08:40

## 2022-04-14 RX ADMIN — ZIPRASIDONE HCL 40 MG: 20 CAPSULE ORAL at 16:30

## 2022-04-14 NOTE — PROGRESS NOTES
04/14/22 1030   Activity/Group Checklist   Group Cognitive therapy   Attendance Attended   Attendance Duration (min) 46-60   Interactions Interacted appropriately   Affect/Mood Appropriate   Goals Achieved Identified feelings; Able to listen to others; Able to engage in interactions; Able to reflect/comment on own behavior;Able to self-disclose; Able to recieve feedback

## 2022-04-14 NOTE — PROGRESS NOTES
Progress Note - James Nicole 47 y o  female MRN: [id number]    Unit/Bed#[facility] 205-02 Encounter: [id number]        Subjective:   Patient seen and examined at bedside after reviewing the chart and discussing the case with the caring staff  Patient examined at bedside  Patient is complaining of constipation for the past few days  She refused her MiraLax yesterday  Denies any abdominal pain, nausea, vomiting  Physical Exam   Vitals: Blood pressure 106/57, pulse 82, temperature (!) 97 4 °F (36 3 °C), temperature source Temporal, resp  rate 16, height 5' 6" (1 676 m), weight 118 kg (260 lb 5 8 oz), SpO2 95 %  ,Body mass index is 42 02 kg/m²  Constitutional: Patient appears well-developed  HEENT: PERR, EOMI, MMM  Cardiovascular: Normal rate and regular rhythm  Pulmonary/Chest: Effort normal and breath sounds normal    Abdomen: Soft, + BS, NT    Assessment/Plan:  James Nicole is a(n) 47y o  year old female with bipolar disorder with depression      1  Cardiac with history of hypertension and dyslipidemia  Patient is on lisinopril 20 mg daily, atorvastatin 40 mg daily and aspirin 81 mg daily  2  Carnitine deficiency  Patient is on levocarnitine 330 mg daily  3  Migraine headaches  Patient may get Tylenol/ibuprofen on as-needed basis  4  Seizure disorder  Patient is on Depakote ER 2000 mg at bedtime  5  Constipation  Patient is on MiraLax 17 g daily  Will start patient on Senokot S twice daily, milk of magnesia as needed  6  DJD/osteoarthritis with lower back pain  Patient may continue to get Tylenol and ibuprofen p r n   7  Intertrigo involving the skin folds and groin  Patient has been put on nystatin powder 3 times daily along with dry gauze to keep the area dry  8  Vitamin-D deficiency  Patient is on vitamin D3 1000 units daily  The patient was discussed with Dr Adonay Pugh and he is in agreement with the above note

## 2022-04-14 NOTE — PROGRESS NOTES
Progress Note - Behavioral Health     Leydi Khan 47 y o  female MRN: [id number]   Unit/Bed#: [facility] 205-02 Encounter: [id number]    Behavior over the last 24 hours: unchanged  Leydi presents with limited insight, limited coping  Has had 6 hospitalizations with similar symptoms in past 6 months  She becomes suspicious that her boyfriend no longer likes her, then her own thoughts tell her to kill herself  But now she has spoken to the boyfriend and feel spontaneously better  She does not recognize the problematic pattern and not able to identify any alternative coping  She is tolerating medication changes and currently denies any hallucinations or suicidal thoughts but continues to require impatient hospitalization due to mood lability/impulsivity and poor insight/poor coping  Participates more appropriately in milieu      Sleep: slept off and on  Appetite: fair  Medication side effects: No   ROS: all other systems are negative    Mental Status Evaluation:    Appearance:  marginal hygiene   Behavior:  cooperative   Speech:  normal rate, normal volume   Mood:  improved   Affect:  blunted   Thought Process:  goal directed   Associations: concrete associations   Thought Content:  some paranoia, ruminating thoughts   Perceptual Disturbances: none   Risk Potential: Suicidal ideation - None  Homicidal ideation - None  Potential for aggression - No   Sensorium:  oriented to person, place and time/date   Memory:  recent and remote memory grossly intact   Consciousness:  alert and awake   Attention: decreased concentration and decreased attention span   Insight:  limited   Judgment: poor   Gait/Station: normal gait/station, normal balance   Motor Activity: no abnormal movements     Vital signs in last 24 hours:    Temp:  [97 4 °F (36 3 °C)-97 7 °F (36 5 °C)] 97 4 °F (36 3 °C)  HR:  [75-82] 82  Resp:  [16-18] 16  BP: (106-122)/(56-70) 106/57    Laboratory results: I have personally reviewed all pertinent laboratory/tests results  Progress Toward Goals: progressing    Assessment/Plan   Principal Problem:    Bipolar affective disorder, depressed, severe, with psychotic behavior ([facility] )  Active Problems:    Primary hypertension    Hyperlipidemia    Class 3 severe obesity due to excess calories without serious comorbidity in adult [facility])    PTSD (post-traumatic stress disorder)    Borderline personality disorder ([facility] )    Seizures ([facility] )    Recommended Treatment:     Planned medication and treatment changes:     All current active medications have been reviewed  Encourage group therapy, milieu therapy and occupational therapy  Behavioral Health checks every 7 minutes  Continue current medications:  Current Facility-Administered Medications   Medication Dose Route Frequency Provider Last Rate    acetaminophen  650 mg Oral Q4H PRN Lyndal Patches, MD      acetaminophen  650 mg Oral Q4H PRN Lyndal Patches, MD      aspirin  81 mg Oral Daily Ronna Estrada, MD      atorvastatin  40 mg Oral Daily With Cayla Bidding, MD      cholecalciferol  1,000 Units Oral Daily Lyndal Patches, MD      divalproex sodium  2,000 mg Oral HS Lyndal Patches, MD      FLUoxetine  80 mg Oral Daily Lyndal Patches, MD      hydrOXYzine HCL  25 mg Oral Q6H PRN Max 4/day Lyndal Patches, MD      ibuprofen  600 mg Oral Q6H PRN Ronna Estrada, MD      levOCARNitine  330 mg Oral Daily Lyndal Patches, MD      lisinopril  20 mg Oral Daily Ronna Estrada, MD      LORazepam  1 mg Intramuscular Q6H PRN Max 3/day Lyndal Patches, MD      LORazepam  0 5 mg Oral Q6H PRN Max 4/day Lyndal Patches, MD      LORazepam  1 mg Oral Q6H PRN Max 3/day Lyndal Patches, MD      melatonin  3 mg Oral HS Lyndal Patches, MD      nystatin   Topical TID Ronna Estrada, MD      OLANZapine  5 mg Intramuscular Q3H PRN Max 3/day Lyndal Patches, MD      polyethylene glycol  17 g Oral Daily Lyndal Patches, MD      QUEtiapine  100 mg Oral Q3H PRN Max 3/day Lyndal Patches, MD      QUEtiapine  25 mg Oral Q6H PRN Max 4/day Abigail Blount MD      QUEtiapine  50 mg Oral Q6H PRN Max 4/day Abigail Blount MD      traZODone  50 mg Oral HS PRN Abigail Blount MD      ziprasidone  40 mg Oral BID With Meals Abigail Blount MD         Risks / Benefits of Treatment:    Risks, benefits, and possible side effects of medications explained to patient and patient verbalizes understanding and agreement for treatment  Counseling / Coordination of Care:    Patient's progress discussed with staff in treatment team meeting  Medications, treatment progress and treatment plan reviewed with patient      SHANI Galaviz 04/14/22

## 2022-04-14 NOTE — PLAN OF CARE
Problem: Alteration in Thoughts and Perception  Goal: Treatment Goal: Gain control of psychotic behaviors/thinking, reduce/eliminate presenting symptoms and demonstrate improved reality functioning upon discharge  Outcome: Progressing  Goal: Verbalize thoughts and feelings  Description: Interventions:  - Promote a nonjudgmental and trusting relationship with the patient through active listening and therapeutic communication  - Assess patient's level of functioning, behavior and potential for risk  - Engage patient in 1 on 1 interactions  - Encourage patient to express fears, feelings, frustrations, and discuss symptoms    - [place] patient to reality, help patient recognize reality-based thinking   - Administer medications as ordered and assess for potential side effects  - Provide the patient education related to the signs and symptoms of the illness and desired effects of prescribed medications  Outcome: Progressing  Goal: Refrain from acting on delusional thinking/internal stimuli  Description: Interventions:  - Monitor patient closely, per order   - Utilize least restrictive measures   - Set reasonable limits, give positive feedback for acceptable   - Administer medications as ordered and monitor of potential side effects  Outcome: Progressing  Goal: Agree to be compliant with medication regime, as prescribed and report medication side effects  Description: Interventions:  - Offer appropriate PRN medication and supervise ingestion; conduct AIMS, as needed   Outcome: Progressing  Goal: Attend and participate in unit activities, including therapeutic, recreational, and educational groups  Description: Interventions:  -Encourage Visitation and family involvement in care  Outcome: Progressing  Goal: Recognize dysfunctional thoughts, communicate reality-based thoughts at the time of discharge  Description: Interventions:  - Provide medication and psycho-education to assist patient in compliance and developing insight into his/her illness   Outcome: Progressing  Goal: Complete daily ADLs, including personal hygiene independently, as able  Description: Interventions:  - Observe, teach, and assist patient with ADLS  - Monitor and promote a balance of rest/activity, with adequate nutrition and elimination   Outcome: Progressing     Problem: Ineffective Coping  Goal: Cooperates with admission process  Description: Interventions:   - Complete admission process  Outcome: Progressing  Goal: Identifies ineffective coping skills  Outcome: Progressing  Goal: Identifies healthy coping skills  Outcome: Progressing  Goal: Demonstrates healthy coping skills  Outcome: Progressing  Goal: Participates in unit activities  Description: Interventions:  - Provide therapeutic environment   - Provide required programming   - Redirect inappropriate behaviors   Outcome: Progressing  Goal: Patient/Family participate in treatment and DC plans  Description: Interventions:  - Provide therapeutic environment  Outcome: Progressing  Goal: Patient/Family verbalizes awareness of resources  Outcome: Progressing  Goal: Understands least restrictive measures  Description: Interventions:  - Utilize least restrictive behavior  Outcome: Progressing  Goal: Free from restraint events  Description: - Utilize least restrictive measures   - Provide behavioral interventions   - Redirect inappropriate behaviors   Outcome: Progressing     Problem: Risk for Self Injury/Neglect  Goal: Treatment Goal: Remain safe during length of stay, learn and adopt new coping skills, and be free of self-injurious ideation, impulses and acts at the time of discharge  Outcome: Progressing  Goal: Verbalize thoughts and feelings  Description: Interventions:  - Assess and re-assess patient's lethality and potential for self-injury  - Engage patient in 1:1 interactions, daily, for a minimum of 15 minutes  - Encourage patient to express feelings, fears, frustrations, hopes  - Establish rapport/trust with patient   Outcome: Progressing  Goal: Refrain from harming self  Description: Interventions:  - Monitor patient closely, per order  - Develop a trusting relationship  - Supervise medication ingestion, monitor effects and side effects   Outcome: Progressing  Goal: Attend and participate in unit activities, including therapeutic, recreational, and educational groups  Description: Interventions:  - Provide therapeutic and educational activities daily, encourage attendance and participation, and document same in the medical record  - Obtain collateral information, encourage visitation and family involvement in care   Outcome: Progressing  Goal: Recognize maladaptive responses and adopt new coping mechanisms  Outcome: Progressing  Goal: Complete daily ADLs, including personal hygiene independently, as able  Description: Interventions:  - Observe, teach, and assist patient with ADLS  - Monitor and promote a balance of rest/activity, with adequate nutrition and elimination  Outcome: Progressing     Problem: Depression  Goal: Treatment Goal: Demonstrate behavioral control of depressive symptoms, verbalize feelings of improved mood/affect, and adopt new coping skills prior to discharge  Outcome: Progressing  Goal: Verbalize thoughts and feelings  Description: Interventions:  - Assess and re-assess patient's level of risk   - Engage patient in 1:1 interactions, daily, for a minimum of 15 minutes   - Encourage patient to express feelings, fears, frustrations, hopes   Outcome: Progressing  Goal: Refrain from harming self  Description: Interventions:  - Monitor patient closely, per order   - Supervise medication ingestion, monitor effects and side effects   Outcome: Progressing  Goal: Refrain from isolation  Description: Interventions:  - Develop a trusting relationship   - Encourage socialization   Outcome: Progressing  Goal: Refrain from self-neglect  Outcome: Progressing  Goal: Attend and participate in unit activities, including therapeutic, recreational, and educational groups  Description: Interventions:  - Provide therapeutic and educational activities daily, encourage attendance and participation, and document same in the medical record   Outcome: Progressing  Goal: Complete daily ADLs, including personal hygiene independently, as able  Description: Interventions:  - Observe, teach, and assist patient with ADLS  -  Monitor and promote a balance of rest/activity, with adequate nutrition and elimination   Outcome: Progressing     Problem: Anxiety  Goal: Anxiety is at manageable level  Description: Interventions:  - Assess and monitor patient's anxiety level  - Monitor for signs and symptoms (heart palpitations, chest pain, shortness of breath, headaches, nausea, feeling jumpy, restlessness, irritable, apprehensive)  - Collaborate with interdisciplinary team and initiate plan and interventions as ordered    - [place] patient to unit/surroundings  - Explain treatment plan  - Encourage participation in care  - Encourage verbalization of concerns/fears  - Identify coping mechanisms  - Assist in developing anxiety-reducing skills  - Administer/offer alternative therapies  - Limit or eliminate stimulants  Outcome: Progressing

## 2022-04-14 NOTE — NURSING NOTE
Patient received on the unit , she was in bed asleep  Easy to arouse, describes feeling "  ok "  Easy to arouse  Appropriate affect for sleep  Denies anxiety and depression, denies SI/HI/AVH  No obvious signs of pain or distress  Good med compliance  Consumed evening snack and fluids remained asleep the entire shift except during snack  And meds

## 2022-04-14 NOTE — PLAN OF CARE
Problem: Ineffective Coping  Goal: Participates in unit activities  Description: Interventions:  - Provide therapeutic environment   - Provide required programming   - Redirect inappropriate behaviors   Outcome: Progressing     Group Goals: Daily Expectations/ Individual Goals/ Thought Provoking  Attendance:  2/2  Participation: Openly shares and remains engaged in groups  Mood/Affect: appropriate, bright  Behaviors/ Redirection: n/a

## 2022-04-14 NOTE — PROGRESS NOTES
04/14/22 0730   Activity/Group Checklist   Group Community meeting   Attendance Attended   Attendance Duration (min) 46-60   Interactions Interacted appropriately   Affect/Mood Appropriate   Goals Achieved Identified feelings; Able to listen to others; Able to engage in interactions; Able to self-disclose; Able to recieve feedback

## 2022-04-14 NOTE — SOCIAL WORK
CM met with PT for PT check in  PT reports that she is doing well, denies SI/HI/AH/VH anxiety and depression  CM reviewed with PT that we are looking at possible D/C early next week, PT in agreement with this  PT reported that she is going to call her BF  Reassurance provided

## 2022-04-14 NOTE — NURSING NOTE
Patient visible in milieu, pleasant and cooperative in interaction  Social with staff and peers  Patient endorses anxiety/depression, 4/10, with fleeting SI, no plan identified  Patient states she will come to staff if she feels like hurting self, no self harm  Denies HI, states of having auditory hallucinations telling her her boyfriend is going to leave her  Patient is able to differentiate hallucinations from reality  Remains medication compliant and on 7" checks for safety and behaviors

## 2022-04-14 NOTE — PROGRESS NOTES
04/14/22   Team Meeting   Meeting Type Daily Rounds   Team Members Present   Team Members Present Physician;Nurse;;; Occupational Therapist   Physician Team Member Dr Alicia Hernandez MD   Nursing Team Member Juan Diego Flynn RN   Care Management Team Member Cindy Nunez [address], [facility] [place], [facility]   Social Work Team Member Kia Narvaez [facility]   OT Team Member Kostas Nichols [place]   Patient/Family Present   Patient Present No   Patient's Family Present No     Endorses AH and SI but contracts for safety; DC next Tuesday; slept; med compliant

## 2022-04-15 PROCEDURE — 99232 SBSQ HOSP IP/OBS MODERATE 35: CPT | Performed by: NURSE PRACTITIONER

## 2022-04-15 RX ADMIN — Medication 1000 UNITS: at 08:41

## 2022-04-15 RX ADMIN — LORAZEPAM 0.5 MG: 0.5 TABLET ORAL at 21:01

## 2022-04-15 RX ADMIN — SENNOSIDES AND DOCUSATE SODIUM 1 TABLET: 50; 8.6 TABLET ORAL at 17:05

## 2022-04-15 RX ADMIN — DIVALPROEX SODIUM 2000 MG: 500 TABLET, EXTENDED RELEASE ORAL at 21:01

## 2022-04-15 RX ADMIN — Medication 3 MG: at 21:01

## 2022-04-15 RX ADMIN — ATORVASTATIN CALCIUM 40 MG: 40 TABLET, FILM COATED ORAL at 17:05

## 2022-04-15 RX ADMIN — SENNOSIDES AND DOCUSATE SODIUM 1 TABLET: 50; 8.6 TABLET ORAL at 08:40

## 2022-04-15 RX ADMIN — POLYETHYLENE GLYCOL 3350 17 G: 17 POWDER, FOR SOLUTION ORAL at 08:40

## 2022-04-15 RX ADMIN — ZIPRASIDONE HCL 40 MG: 20 CAPSULE ORAL at 08:40

## 2022-04-15 RX ADMIN — LISINOPRIL 20 MG: 20 TABLET ORAL at 08:40

## 2022-04-15 RX ADMIN — NYSTATIN: 100000 POWDER TOPICAL at 08:43

## 2022-04-15 RX ADMIN — FLUOXETINE 80 MG: 20 CAPSULE ORAL at 08:40

## 2022-04-15 RX ADMIN — ASPIRIN 81 MG 81 MG: 81 TABLET ORAL at 08:40

## 2022-04-15 RX ADMIN — LEVOCARNITINE 330 MG: 1 SOLUTION ORAL at 08:40

## 2022-04-15 RX ADMIN — ZIPRASIDONE HCL 40 MG: 20 CAPSULE ORAL at 17:05

## 2022-04-15 NOTE — PROGRESS NOTES
Progress Note - Vesta Mcgee 47 y o  female MRN: [id number]    Unit/Bed#[facility] 205-02 Encounter: [id number]        Subjective:   Patient seen and examined at bedside after reviewing the chart and discussing the case with the caring staff  Patient examined at bedside  Patient has no acute complaints  Physical Exam   Vitals: Blood pressure 134/75, pulse 80, temperature 97 5 °F (36 4 °C), temperature source Temporal, resp  rate 16, height 5' 6" (1 676 m), weight 118 kg (260 lb 5 8 oz), SpO2 96 %  ,Body mass index is 42 02 kg/m²  Constitutional: Patient appears well-developed  HEENT: PERR, EOMI, MMM  Cardiovascular: Normal rate and regular rhythm  Pulmonary/Chest: Effort normal and breath sounds normal    Abdomen: Soft, + BS, NT, nondistended  Assessment/Plan:  Vesta Mcgee is a(n) 47y o  year old female with bipolar disorder with depression      1  Cardiac with history of hypertension and dyslipidemia  Patient is on lisinopril 20 mg daily, atorvastatin 40 mg daily and aspirin 81 mg daily  2  Carnitine deficiency  Patient is on levocarnitine 330 mg daily  3  Migraine headaches  Patient may get Tylenol/ibuprofen on as-needed basis  4  Seizure disorder  Patient is on Depakote ER 2000 mg at bedtime  5  Constipation  Patient is on MiraLax 17 g daily, Senokot S twice daily, milk of magnesia as needed  6  DJD/osteoarthritis with lower back pain  Patient may continue to get Tylenol and ibuprofen p r n   7  Intertrigo involving the skin folds and groin  Patient has been put on nystatin powder 3 times daily along with dry gauze to keep the area dry  8  Vitamin-D deficiency  Patient is on vitamin D3 1000 units daily  The patient was discussed with Dr Norma Matias and he is in agreement with the above note

## 2022-04-15 NOTE — PROGRESS NOTES
04/15/22    Team Meeting   Meeting Type Daily Rounds   Team Members Present   Team Members Present Physician;Nurse;;; Occupational Therapist   Physician Team Member Dr Lynad Valenzuela MD   Nursing Team Member Angie Acevedo, JOSÉ MIGUEL   Care Management Team Member Cindy Arguelles [facility], [facility], [facility]   Social Work Team Member Clara Murray [facility]   OT Team Member Santiago Haro [place]   Patient/Family Present   Patient Present No   Patient's Family Present No     DC Tues; pleasant, coopeartive; anx/dep 5; slept; denies all else

## 2022-04-15 NOTE — NURSING NOTE
Patient appears to have slept through the majority of the night without issue  No complaints voiced   Will continue to monitor via q7 minute checks

## 2022-04-15 NOTE — PROGRESS NOTES
Patient is bright and interactive with peers and staff  She has been feeling better and just would like to discharge home to be with her boyfriend  She realizes she was wrong in her thoughts and actions of self harm (cutting)  Cutting is how she has been dealing with painful situations since she is 17years old  She spoke about talking before reacting in the future when it comes to concerns, worries and traumas  She is excited and talking about her discharge early next week

## 2022-04-15 NOTE — NURSING NOTE
Patient out for meals and groups but otherwise returns to room to rest  She is pleasant and social with peers  Denies feeling depressed or anxious today  Denies hallucinations and suicidal thoughts  No complaints of pain  She reports having had a BM today  Reports a small amount of blood in stool  Patient encouraged to show staff next time  Will continue monitoring

## 2022-04-15 NOTE — PROGRESS NOTES
Progress Note - Behavioral Health     Leydi Khan 47 y o  female MRN: [id number]   Unit/Bed#: [facility] 205-02 Encounter: [id number]    Behavior over the last 24 hours: unchanged  Leydi in seen in the community room/day room  She continues to report mood is improved and suicidal thoughts have resolved  Describes hearing her own voice prior to admission - telling herself that her boyfriend no longer likes her and she should "just kill myself"  She no longer hears this voice  She has limited insight regarding the repetitive cycle of these thoughts or how to cope with them  She is tolerating the discontinuation of Wellbutrin  Continue all other medications the same  Valproic acid level yesterday was 92  Participates appropriately in milieu  Sleep: slept off and on  Appetite: fair  Medication side effects: No   ROS: all other systems are negative    Mental Status Evaluation:    Appearance:  marginal hygiene   Behavior:  cooperative   Speech:  normal rate, normal volume   Mood:  dysphoric   Affect:  blunted   Thought Process:  perseverative   Associations: concrete associations   Thought Content:  some paranoia   Perceptual Disturbances: none   Risk Potential: Suicidal ideation - None  Homicidal ideation - None  Potential for aggression - No   Sensorium:  oriented to person, place and time/date   Memory:  recent and remote memory grossly intact   Consciousness:  alert and awake   Attention: decreased concentration and decreased attention span   Insight:  limited   Judgment: limited   Gait/Station: normal gait/station, normal balance   Motor Activity: no abnormal movements     Vital signs in last 24 hours:    Temp:  [97 1 °F (36 2 °C)-97 5 °F (36 4 °C)] 97 5 °F (36 4 °C)  HR:  [79-80] 80  Resp:  [16-18] 16  BP: (100-134)/(50-75) 134/75    Laboratory results: I have personally reviewed all pertinent laboratory/tests results        Progress Toward Goals: continues to improve    Assessment/Plan   Principal Problem:    Bipolar affective disorder, depressed, severe, with psychotic behavior ([facility] )  Active Problems:    Primary hypertension    Hyperlipidemia    Class 3 severe obesity due to excess calories without serious comorbidity in adult [facility])    PTSD (post-traumatic stress disorder)    Borderline personality disorder ([facility] )    Seizures ([facility] )    Recommended Treatment:     Planned medication and treatment changes:     All current active medications have been reviewed  Encourage group therapy, milieu therapy and occupational therapy  Behavioral Health checks every 7 minutes  Continue current medications:  Current Facility-Administered Medications   Medication Dose Route Frequency Provider Last Rate    acetaminophen  650 mg Oral Q4H PRN Vlad Bearelliott, MD      acetaminophen  650 mg Oral Q4H PRN Vlad Bearded, MD      aspirin  81 mg Oral Daily Danielle Tay MD      atorvastatin  40 mg Oral Daily With Bridgett Bains MD      cholecalciferol  1,000 Units Oral Daily Vlad Bearded, MD      divalproex sodium  2,000 mg Oral HS Vlad Bearded, MD      FLUoxetine  80 mg Oral Daily Vlad Bearded, MD      hydrOXYzine HCL  25 mg Oral Q6H PRN Max 4/day Vlad Bearded, MD      ibuprofen  600 mg Oral Q6H PRN Danielle Tay MD      levOCARNitine  330 mg Oral Daily Vlad Bearded, MD      lisinopril  20 mg Oral Daily Danielle Tay MD      LORazepam  1 mg Intramuscular Q6H PRN Max 3/day Vlad Bearded, MD      LORazepam  0 5 mg Oral Q6H PRN Max 4/day Vlad Bearded, MD      LORazepam  1 mg Oral Q6H PRN Max 3/day Vlad Bearded, MD      magnesium hydroxide  30 mL Oral Daily PRN Sarha Mckay PA-C      melatonin  3 mg Oral HS Vlad Bearded, MD      nystatin   Topical TID Danielle Tay MD      OLANZapine  5 mg Intramuscular Q3H PRN Max 3/day Vlad Bearded, MD      polyethylene glycol  17 g Oral Daily Vlad Bearelliott, MD      QUEtiapine  100 mg Oral Q3H PRN Max 3/day Vlad Bearded, MD      QUEtiapine  25 mg Oral Q6H PRN Max 4/day Vlad Bearded, MD Nupur Hutton QUEtiapine  50 mg Oral Q6H PRN Max 4/day Vlad Saldivar MD      senna-docusate sodium  1 tablet Oral BID Sarah Mckay PA-C      traZODone  50 mg Oral HS PRN Vlad Saldivar MD      ziprasidone  40 mg Oral BID With Meals Vlad Saldivar MD         Risks / Benefits of Treatment:    Risks, benefits, and possible side effects of medications explained to patient and patient verbalizes understanding and agreement for treatment  Counseling / Coordination of Care:    Patient's progress discussed with staff in treatment team meeting  Medications, treatment progress and treatment plan reviewed with patient      SHANI Mckeon 04/15/22

## 2022-04-15 NOTE — PROGRESS NOTES
04/15/22 0764   Activity/Group Checklist   Group Community meeting   Attendance Attended   Attendance Duration (min) 46-60   Interactions Interacted appropriately   Affect/Mood Appropriate   Goals Achieved Identified feelings; Able to listen to others; Able to engage in interactions; Able to self-disclose; Able to recieve feedback

## 2022-04-15 NOTE — NURSING NOTE
Patient visible on unit, social with select peers  Able to make needs known  Denies SI/HI and hallucinations  Endorses depression as 5/10  Refused Mycostatin powder at HS  No c/o pain or discomfort voice  Medication compliant and positive for snack   Will continue to monitor via q7 minute checks

## 2022-04-15 NOTE — SOCIAL WORK
CM placed call to [facility] with [facility] [phone], ext [phone]   Left detailed message with PT status and progress along with plan to D/C on Tuesday grant it PT continues with progress  Requested return call to review PT case, address any questions and to set up D/C plan

## 2022-04-15 NOTE — PROGRESS NOTES
04/15/22 1030   Activity/Group Checklist   Group Wellness   Attendance Attended   Attendance Duration (min) 46-60   Interactions Interacted appropriately   Affect/Mood Appropriate   Goals Achieved Identified feelings; Able to listen to others; Able to engage in interactions; Able to reflect/comment on own behavior;Verbalized increased hopefulness; Able to self-disclose; Able to recieve feedback

## 2022-04-15 NOTE — PLAN OF CARE
Problem: Ineffective Coping  Goal: Participates in unit activities  Description: Interventions:  - Provide therapeutic environment   - Provide required programming   - Redirect inappropriate behaviors   Outcome: Progressing        Group Goals: Daily Expectations/Individual Goals/Memory Sharing  Attendance: 2/2  Participation: openly shares and remains engaged  Mood Affect: appropriate  Behaviors/Redirection: n/a

## 2022-04-15 NOTE — NURSING NOTE
Around 1500, patient reported feeling like she wants to hurt herself  Patient was unable to verbalize a plan as to how she would hurt herself but states she could think of a way  Patient states the reason for wanting to hurt herself is because she feels her boyfriend does not love her and she has been unable to talk with him since she's been in the hospital  Patient reassured and encouraged to sit at the nurse's station in a recliner  States she feels safe sitting by nurse's station and is unable to hurt herself  As of 31 75 62, patient continues to verbalize these feelings and remains at nurse's station doing word searches as a coping skill  Will continue monitoring

## 2022-04-16 PROCEDURE — 99232 SBSQ HOSP IP/OBS MODERATE 35: CPT | Performed by: PSYCHIATRY & NEUROLOGY

## 2022-04-16 RX ORDER — ASPIRIN 81 MG/1
81 TABLET, CHEWABLE ORAL DAILY
Qty: 30 TABLET | Refills: 0 | Status: ON HOLD | OUTPATIENT
Start: 2022-04-16 | End: 2022-05-06 | Stop reason: SDUPTHER

## 2022-04-16 RX ORDER — LEVOCARNITINE 1 G/10ML
330 SOLUTION ORAL DAILY
Qty: 100 ML | Refills: 0 | Status: ON HOLD | OUTPATIENT
Start: 2022-04-16 | End: 2022-05-06 | Stop reason: SDUPTHER

## 2022-04-16 RX ORDER — IBUPROFEN 600 MG/1
600 TABLET ORAL EVERY 6 HOURS PRN
Qty: 100 TABLET | Refills: 0 | Status: ON HOLD | OUTPATIENT
Start: 2022-04-16 | End: 2022-05-06 | Stop reason: SDUPTHER

## 2022-04-16 RX ORDER — NYSTATIN 100000 [USP'U]/G
POWDER TOPICAL 3 TIMES DAILY
Qty: 15 G | Refills: 0 | Status: SHIPPED | OUTPATIENT
Start: 2022-04-16 | End: 2022-05-09 | Stop reason: HOSPADM

## 2022-04-16 RX ORDER — ATORVASTATIN CALCIUM 40 MG/1
40 TABLET, FILM COATED ORAL
Qty: 30 TABLET | Refills: 0 | Status: ON HOLD | OUTPATIENT
Start: 2022-04-16 | End: 2022-05-06 | Stop reason: SDUPTHER

## 2022-04-16 RX ORDER — LANOLIN ALCOHOL/MO/W.PET/CERES
3 CREAM (GRAM) TOPICAL
Qty: 30 TABLET | Refills: 0 | Status: SHIPPED | OUTPATIENT
Start: 2022-04-16 | End: 2022-04-19 | Stop reason: SDUPTHER

## 2022-04-16 RX ORDER — MELATONIN
1000 DAILY
Qty: 30 TABLET | Refills: 0 | Status: ON HOLD | OUTPATIENT
Start: 2022-04-16 | End: 2022-05-06 | Stop reason: SDUPTHER

## 2022-04-16 RX ORDER — LISINOPRIL 20 MG/1
20 TABLET ORAL DAILY
Qty: 30 TABLET | Refills: 0 | Status: ON HOLD | OUTPATIENT
Start: 2022-04-16 | End: 2022-05-06 | Stop reason: SDUPTHER

## 2022-04-16 RX ADMIN — Medication 3 MG: at 21:07

## 2022-04-16 RX ADMIN — LORAZEPAM 0.5 MG: 0.5 TABLET ORAL at 12:57

## 2022-04-16 RX ADMIN — LEVOCARNITINE 330 MG: 1 SOLUTION ORAL at 07:46

## 2022-04-16 RX ADMIN — DIVALPROEX SODIUM 2000 MG: 500 TABLET, EXTENDED RELEASE ORAL at 21:07

## 2022-04-16 RX ADMIN — LISINOPRIL 20 MG: 20 TABLET ORAL at 07:47

## 2022-04-16 RX ADMIN — ASPIRIN 81 MG 81 MG: 81 TABLET ORAL at 07:47

## 2022-04-16 RX ADMIN — Medication 1000 UNITS: at 07:47

## 2022-04-16 RX ADMIN — FLUOXETINE 80 MG: 20 CAPSULE ORAL at 07:46

## 2022-04-16 RX ADMIN — SENNOSIDES AND DOCUSATE SODIUM 1 TABLET: 50; 8.6 TABLET ORAL at 07:46

## 2022-04-16 RX ADMIN — ZIPRASIDONE HCL 40 MG: 20 CAPSULE ORAL at 07:46

## 2022-04-16 RX ADMIN — ATORVASTATIN CALCIUM 40 MG: 40 TABLET, FILM COATED ORAL at 16:06

## 2022-04-16 RX ADMIN — SENNOSIDES AND DOCUSATE SODIUM 1 TABLET: 50; 8.6 TABLET ORAL at 16:06

## 2022-04-16 RX ADMIN — ZIPRASIDONE HCL 40 MG: 20 CAPSULE ORAL at 16:06

## 2022-04-16 NOTE — PLAN OF CARE
Problem: Alteration in Thoughts and Perception  Goal: Treatment Goal: Gain control of psychotic behaviors/thinking, reduce/eliminate presenting symptoms and demonstrate improved reality functioning upon discharge  Outcome: Progressing  Goal: Verbalize thoughts and feelings  Description: Interventions:  - Promote a nonjudgmental and trusting relationship with the patient through active listening and therapeutic communication  - Assess patient's level of functioning, behavior and potential for risk  - Engage patient in 1 on 1 interactions  - Encourage patient to express fears, feelings, frustrations, and discuss symptoms    - [place] patient to reality, help patient recognize reality-based thinking   - Administer medications as ordered and assess for potential side effects  - Provide the patient education related to the signs and symptoms of the illness and desired effects of prescribed medications  Outcome: Progressing  Goal: Refrain from acting on delusional thinking/internal stimuli  Description: Interventions:  - Monitor patient closely, per order   - Utilize least restrictive measures   - Set reasonable limits, give positive feedback for acceptable   - Administer medications as ordered and monitor of potential side effects  Outcome: Progressing  Goal: Agree to be compliant with medication regime, as prescribed and report medication side effects  Description: Interventions:  - Offer appropriate PRN medication and supervise ingestion; conduct AIMS, as needed   Outcome: Progressing  Goal: Attend and participate in unit activities, including therapeutic, recreational, and educational groups  Description: Interventions:  -Encourage Visitation and family involvement in care  Outcome: Progressing  Goal: Recognize dysfunctional thoughts, communicate reality-based thoughts at the time of discharge  Description: Interventions:  - Provide medication and psycho-education to assist patient in compliance and developing insight into his/her illness   Outcome: Progressing  Goal: Complete daily ADLs, including personal hygiene independently, as able  Description: Interventions:  - Observe, teach, and assist patient with ADLS  - Monitor and promote a balance of rest/activity, with adequate nutrition and elimination   Outcome: Progressing

## 2022-04-16 NOTE — NURSING NOTE
Patient observed sleeping in recliner near nurses station during the majority of the overnight, stating she didn't "feel right" sleeping her room  Able to make needs known  No further complaints voiced   Will continue to monitor via q7 minute safety checks

## 2022-04-16 NOTE — NURSING NOTE
Patient observed sitting quietly in recliner at nurses station  Patient endorses fleeting SI, no plan identified  Patient states  she is afraid her boyfriend will leave her  PRN  Ativan given for anxiety and depression rated 10/10 and a bhakta score of 24, which was effective  She also states she will come to staff if she feels like hurting self, no self harm  Denies HI and hallucinations  Positive for snack  Medication compliant  No acute behaviors noted  Will continue to monitor via q7 minute safety checks

## 2022-04-16 NOTE — NURSING NOTE
Patient reporting thoughts of wanting to harm herself throughout most of the day  0 5mg Ativan PRN administered at 1257 for 10/10 anxiety  Upon reassessment, reported minimal relief  After dinner, patient was able to reach her boyfriend via phone and appeared much brighter and verbalizes feeling much better  She denies any thoughts of self harm at this time  Will continue monitoring

## 2022-04-16 NOTE — NURSING NOTE
Patient rates her anxiety and depression both 10/10 this morning  When asked about hallucinations, she states she is hearing voices but then states it is her own thoughts she is hearing  She states these thoughts are telling her that she wants to hurt herself  She does endorse thoughts of wanting to self harm by cutting herself  These thoughts seem to be fleeting as she told another nurse just prior to being assessed by this nurse that she was not having any thoughts to hurt herself  She has no complaints of pain  She refused her Miralax as she states her bowel movements have become regular  Will continue monitoring

## 2022-04-16 NOTE — PROGRESS NOTES
Progress Note - James Nicole 47 y o  female MRN: [id number]    Unit/Bed#[facility] 209-02 Encounter: [id number]        Subjective:   Patient seen and examined at bedside after reviewing the chart and discussing the case with the caring staff  Patient examined at bedside  Patient has no acute complaints  Patient is a possible discharge for Monday 04/19/2022  Patient is requesting all her prescriptions  I reviewed and reconciled patient's problem list and medications  Physical Exam   Vitals: Blood pressure 123/59, pulse 76, temperature 97 5 °F (36 4 °C), temperature source Temporal, resp  rate 18, height 5' 6" (1 676 m), weight 118 kg (260 lb 5 8 oz), SpO2 97 %  ,Body mass index is 42 02 kg/m²  Constitutional: Patient appears well-developed  HEENT: PERR, EOMI, MMM  Cardiovascular: Normal rate and regular rhythm  Pulmonary/Chest: Effort normal and breath sounds normal    Abdomen: Soft, + BS, NT, nondistended  Assessment/Plan:  James Nicole is a(n) 47y o  year old female with bipolar disorder with depression      MEDICAL CLEARANCE  Patient is medically cleared for discharge  All scripts will be sent out for the patient  1  Cardiac with history of hypertension and dyslipidemia  Patient is on lisinopril 20 mg daily, atorvastatin 40 mg daily and aspirin 81 mg daily  2  Carnitine deficiency  Patient is on levocarnitine 330 mg daily  3  Migraine headaches  Patient may get Tylenol/ibuprofen on as-needed basis  4  Seizure disorder  Patient is on Depakote ER 2000 mg at bedtime  5  Constipation  Patient is on MiraLax 17 g daily, Senokot S twice daily, milk of magnesia as needed  6  DJD/osteoarthritis with lower back pain  Patient may continue to get Tylenol and ibuprofen p r n   7  Intertrigo involving the skin folds and groin  Patient has been put on nystatin powder 3 times daily along with dry gauze to keep the area dry  8  Vitamin-D deficiency    Patient is on vitamin D3 1000 units daily

## 2022-04-16 NOTE — PROGRESS NOTES
C/O" I still feel like Anne Estes hurt myself that is why I am laying in this chair next to the nursing station close to the nursing station'    Report from staff regarding this patient received and record reviewed  prior to seeing this patient   Behavior over the last 24 hours:  Disposition being treated area in the unit for bipolar disorder she is laying in a chair get a chair close to the nursing station as she expressed some mood swings and thoughts to hurt herself but able to contract   Sleep:  Okay  Appetite:  Okay  Medication side effects:  Denies  ROS:  Still symptomatic  Mental Status Evaluation:  Appearance:  Dressed appropraitely, moderately obese white full   Behavior:  cooperative   Speech:  normal   Mood:  Dysphoric   Affect:    blunted   Thought Process:  Disorganized   Thought Content:  Depressive   Perceptual Disturbances: Denied AV hallucination   Risk Potential: Expressed suicidal ideation since heart able to contract   Sensorium:  normal   Cognition:  intact   Consciousness:  Alert, have a   Attention: Fair   Insight:  poor   Judgment: poor   Gait/Station: I did not see her walk she is lying gerichair   Motor Activity: No abnormal movement     Progress Toward Goals: working on current treatment goals, no changes  Made in treatment plan   Recommended Treatment: Continue with group therapy, milieu therapy and occupational therapy  Risks, benefits and possible side effects of Medications:   Risks, benefits, and possible side effects of medications explained to patient and patient verbalizes understanding        Medications:   current meds:   Current Facility-Administered Medications   Medication Dose Route Frequency    acetaminophen (TYLENOL) tablet 650 mg  650 mg Oral Q4H PRN    acetaminophen (TYLENOL) tablet 650 mg  650 mg Oral Q4H PRN    aspirin chewable tablet 81 mg  81 mg Oral Daily    atorvastatin (LIPITOR) tablet 40 mg  40 mg Oral Daily With Dinner    cholecalciferol (VITAMIN D3) tablet 1,000 Units  1,000 Units Oral Daily    divalproex sodium (DEPAKOTE ER) 24 hr tablet 2,000 mg  2,000 mg Oral HS    FLUoxetine (PROzac) capsule 80 mg  80 mg Oral Daily    hydrOXYzine HCL (ATARAX) tablet 25 mg  25 mg Oral Q6H PRN Max 4/day    ibuprofen (MOTRIN) tablet 600 mg  600 mg Oral Q6H PRN    levOCARNitine (CARNITOR) oral solution 330 mg  330 mg Oral Daily    lisinopril (ZESTRIL) tablet 20 mg  20 mg Oral Daily    LORazepam (ATIVAN) injection 1 mg  1 mg Intramuscular Q6H PRN Max 3/day    LORazepam (ATIVAN) tablet 0 5 mg  0 5 mg Oral Q6H PRN Max 4/day    LORazepam (ATIVAN) tablet 1 mg  1 mg Oral Q6H PRN Max 3/day    magnesium hydroxide (MILK OF MAGNESIA) oral suspension 30 mL  30 mL Oral Daily PRN    melatonin tablet 3 mg  3 mg Oral HS    nystatin (MYCOSTATIN) powder   Topical TID    OLANZapine (ZyPREXA) IM injection 5 mg  5 mg Intramuscular Q3H PRN Max 3/day    polyethylene glycol (MIRALAX) packet 17 g  17 g Oral Daily    QUEtiapine (SEROquel) tablet 100 mg  100 mg Oral Q3H PRN Max 3/day    QUEtiapine (SEROquel) tablet 25 mg  25 mg Oral Q6H PRN Max 4/day    QUEtiapine (SEROquel) tablet 50 mg  50 mg Oral Q6H PRN Max 4/day    senna-docusate sodium (SENOKOT S) 8 6-50 mg per tablet 1 tablet  1 tablet Oral BID    traZODone (DESYREL) tablet 50 mg  50 mg Oral HS PRN    ziprasidone (GEODON) capsule 40 mg  40 mg Oral BID With Meals     Labs: NA    Assessment, Diagnosis  and Plan: continue with current meds and goals, F/U tomorrow    Counseling / Coordination of Care  Total floor / unit time spent today20 minutes  minutes  Greater than 50% of total time was spent with the patient and / or family counseling and / or coordination of care  A description of the counseling / coordination of care:      Alverto Lopez MD

## 2022-04-17 PROCEDURE — 99232 SBSQ HOSP IP/OBS MODERATE 35: CPT | Performed by: PSYCHIATRY & NEUROLOGY

## 2022-04-17 RX ADMIN — LISINOPRIL 20 MG: 20 TABLET ORAL at 07:51

## 2022-04-17 RX ADMIN — ZIPRASIDONE HCL 40 MG: 20 CAPSULE ORAL at 16:52

## 2022-04-17 RX ADMIN — ZIPRASIDONE HCL 40 MG: 20 CAPSULE ORAL at 07:53

## 2022-04-17 RX ADMIN — ASPIRIN 81 MG 81 MG: 81 TABLET ORAL at 07:53

## 2022-04-17 RX ADMIN — Medication 3 MG: at 21:53

## 2022-04-17 RX ADMIN — Medication 1000 UNITS: at 07:53

## 2022-04-17 RX ADMIN — SENNOSIDES AND DOCUSATE SODIUM 1 TABLET: 50; 8.6 TABLET ORAL at 16:52

## 2022-04-17 RX ADMIN — FLUOXETINE 80 MG: 20 CAPSULE ORAL at 07:53

## 2022-04-17 RX ADMIN — LEVOCARNITINE 330 MG: 1 SOLUTION ORAL at 07:55

## 2022-04-17 RX ADMIN — ATORVASTATIN CALCIUM 40 MG: 40 TABLET, FILM COATED ORAL at 16:52

## 2022-04-17 RX ADMIN — DIVALPROEX SODIUM 2000 MG: 500 TABLET, EXTENDED RELEASE ORAL at 21:53

## 2022-04-17 RX ADMIN — SENNOSIDES AND DOCUSATE SODIUM 1 TABLET: 50; 8.6 TABLET ORAL at 07:53

## 2022-04-17 NOTE — NURSING NOTE
Pt present in her room at time of assessment  Pt denies anxiety and depression at this time because she reported that her bf contacted her and she's happy now  Pt denied ah, vh, si, hi and stated she was only situationally suicidal before because of her bf not getting a hold of her  Pt was pleasant and cooperative this shift  Compliant with medications  Pt went to bed right after med administration  Continuous visual safety checks performed throughout the shift  Safety precautions maintained  Will continue to monitor

## 2022-04-17 NOTE — PROGRESS NOTES
C/O" my boyfriend called yesterday and he said that he misses me he wants me to come back home and I am looking forward to getting discharged'    Report from staff regarding this patient received and record reviewed  prior to seeing this patient   Behavior over the last 24 hours: This patient was seen in her room not on her bed reports she is doing very well taking medication denied issues she indicated that her boyfriend called and that he is hoping her to get discharged soon she denied any racing thoughts and depression side effect of medications smile on her face ear to ear  Sleep:  Good  Appetite:  Good  Medication side effects:  None  ROS:  Improving  Mental Status Evaluation:  Appearance:  Dressed appropriately, average height white woman moderately obese   Behavior:  cooperative   Speech:  normal   Mood:  euthymic   Affect:  appropriate     Thought Process:  Goal directed   Thought Content:  normal   Perceptual Disturbances: Denied AV hallucination   Risk Potential: NO YECENIA    Sensorium:  normal   Cognition:  intact   Consciousness:  Alert, OX3   Attention: Fair   Insight:  fair   Judgment: fair   Gait/Station: With in normal range   Motor Activity: With in normal range     Progress Toward Goals: working on current treatment goals, no changes  Made in treatment plan   Recommended Treatment: Continue with group therapy, milieu therapy and occupational therapy  Risks, benefits and possible side effects of Medications:   Risks, benefits, and possible side effects of medications explained to patient and patient verbalizes understanding        Medications:   current meds:   Current Facility-Administered Medications   Medication Dose Route Frequency    acetaminophen (TYLENOL) tablet 650 mg  650 mg Oral Q4H PRN    acetaminophen (TYLENOL) tablet 650 mg  650 mg Oral Q4H PRN    aspirin chewable tablet 81 mg  81 mg Oral Daily    atorvastatin (LIPITOR) tablet 40 mg  40 mg Oral Daily With [employer] cholecalciferol (VITAMIN D3) tablet 1,000 Units  1,000 Units Oral Daily    divalproex sodium (DEPAKOTE ER) 24 hr tablet 2,000 mg  2,000 mg Oral HS    FLUoxetine (PROzac) capsule 80 mg  80 mg Oral Daily    hydrOXYzine HCL (ATARAX) tablet 25 mg  25 mg Oral Q6H PRN Max 4/day    ibuprofen (MOTRIN) tablet 600 mg  600 mg Oral Q6H PRN    levOCARNitine (CARNITOR) oral solution 330 mg  330 mg Oral Daily    lisinopril (ZESTRIL) tablet 20 mg  20 mg Oral Daily    LORazepam (ATIVAN) injection 1 mg  1 mg Intramuscular Q6H PRN Max 3/day    LORazepam (ATIVAN) tablet 0 5 mg  0 5 mg Oral Q6H PRN Max 4/day    LORazepam (ATIVAN) tablet 1 mg  1 mg Oral Q6H PRN Max 3/day    magnesium hydroxide (MILK OF MAGNESIA) oral suspension 30 mL  30 mL Oral Daily PRN    melatonin tablet 3 mg  3 mg Oral HS    nystatin (MYCOSTATIN) powder   Topical TID    OLANZapine (ZyPREXA) IM injection 5 mg  5 mg Intramuscular Q3H PRN Max 3/day    polyethylene glycol (MIRALAX) packet 17 g  17 g Oral Daily    QUEtiapine (SEROquel) tablet 100 mg  100 mg Oral Q3H PRN Max 3/day    QUEtiapine (SEROquel) tablet 25 mg  25 mg Oral Q6H PRN Max 4/day    QUEtiapine (SEROquel) tablet 50 mg  50 mg Oral Q6H PRN Max 4/day    senna-docusate sodium (SENOKOT S) 8 6-50 mg per tablet 1 tablet  1 tablet Oral BID    traZODone (DESYREL) tablet 50 mg  50 mg Oral HS PRN    ziprasidone (GEODON) capsule 40 mg  40 mg Oral BID With Meals     Labs: NA    Assessment, Diagnosis  and Plan: continue with current meds and goals, F/U tomorrow    Counseling / Coordination of Care  Total floor / unit time spent today20 minutes  minutes  Greater than 50% of total time was spent with the patient and / or family counseling and / or coordination of care  A description of the counseling / coordination of care:      Violet Vargas MD

## 2022-04-17 NOTE — NURSING NOTE
Patient pleasant and bright this morning  Reports her mood is good and denies feeling depressed or anxious  Denies suicidal thoughts at this time  She is looking forward to upcoming discharge  No complaints of pain  Compliant with medications  Will continue monitoring

## 2022-04-17 NOTE — PLAN OF CARE
Problem: Alteration in Thoughts and Perception  Goal: Treatment Goal: Gain control of psychotic behaviors/thinking, reduce/eliminate presenting symptoms and demonstrate improved reality functioning upon discharge  Outcome: Progressing  Goal: Verbalize thoughts and feelings  Description: Interventions:  - Promote a nonjudgmental and trusting relationship with the patient through active listening and therapeutic communication  - Assess patient's level of functioning, behavior and potential for risk  - Engage patient in 1 on 1 interactions  - Encourage patient to express fears, feelings, frustrations, and discuss symptoms    - [place] patient to reality, help patient recognize reality-based thinking   - Administer medications as ordered and assess for potential side effects  - Provide the patient education related to the signs and symptoms of the illness and desired effects of prescribed medications  Outcome: Progressing  Goal: Refrain from acting on delusional thinking/internal stimuli  Description: Interventions:  - Monitor patient closely, per order   - Utilize least restrictive measures   - Set reasonable limits, give positive feedback for acceptable   - Administer medications as ordered and monitor of potential side effects  Outcome: Progressing  Goal: Agree to be compliant with medication regime, as prescribed and report medication side effects  Description: Interventions:  - Offer appropriate PRN medication and supervise ingestion; conduct AIMS, as needed   Outcome: Progressing  Goal: Attend and participate in unit activities, including therapeutic, recreational, and educational groups  Description: Interventions:  -Encourage Visitation and family involvement in care  Outcome: Progressing  Goal: Recognize dysfunctional thoughts, communicate reality-based thoughts at the time of discharge  Description: Interventions:  - Provide medication and psycho-education to assist patient in compliance and developing insight into his/her illness   Outcome: Progressing  Goal: Complete daily ADLs, including personal hygiene independently, as able  Description: Interventions:  - Observe, teach, and assist patient with ADLS  - Monitor and promote a balance of rest/activity, with adequate nutrition and elimination   Outcome: Progressing

## 2022-04-17 NOTE — PROGRESS NOTES
Progress Note - Benny Varghese 47 y o  female MRN: [id number]    Unit/Bed#[facility] 209-02 Encounter: [id number]        Subjective:   Patient seen and examined at bedside after reviewing the chart and discussing the case with the caring staff  Patient examined at bedside  Patient has no acute complaints  Patient is a possible discharge for Monday 04/19/2022  Patient is requesting all her prescriptions  I reviewed and reconciled patient's problem list and medications  Physical Exam   Vitals: Blood pressure 153/73, pulse 78, temperature 97 7 °F (36 5 °C), temperature source Temporal, resp  rate 18, height 5' 6" (1 676 m), weight 118 kg (260 lb 5 8 oz), SpO2 98 %  ,Body mass index is 42 02 kg/m²  Constitutional: Patient appears well-developed  HEENT: PERR, EOMI, MMM  Cardiovascular: Normal rate and regular rhythm  Pulmonary/Chest: Effort normal and breath sounds normal    Abdomen: Soft, + BS, NT, nondistended  Assessment/Plan:  Benny Varghese is a(n) 47y o  year old female with bipolar disorder with depression      MEDICAL CLEARANCE  Patient is medically cleared for discharge  All scripts will be sent out for the patient  1  Cardiac with history of hypertension and dyslipidemia  Patient is on lisinopril 20 mg daily, atorvastatin 40 mg daily and aspirin 81 mg daily  2  Carnitine deficiency  Patient is on levocarnitine 330 mg daily  3  Migraine headaches  Patient may get Tylenol/ibuprofen on as-needed basis  4  Seizure disorder  Patient is on Depakote ER 2000 mg at bedtime  5  Constipation  Patient is on MiraLax 17 g daily, Senokot S twice daily, milk of magnesia as needed  6  DJD/osteoarthritis with lower back pain  Patient may continue to get Tylenol and ibuprofen p r n   7  Intertrigo involving the skin folds and groin  Patient has been put on nystatin powder 3 times daily along with dry gauze to keep the area dry  8  Vitamin-D deficiency  Patient is on vitamin D3 1000 units daily

## 2022-04-18 PROCEDURE — 0241U HB NFCT DS VIR RESP RNA 4 TRGT: CPT | Performed by: PSYCHIATRY & NEUROLOGY

## 2022-04-18 PROCEDURE — 99232 SBSQ HOSP IP/OBS MODERATE 35: CPT | Performed by: NURSE PRACTITIONER

## 2022-04-18 RX ADMIN — SENNOSIDES AND DOCUSATE SODIUM 1 TABLET: 50; 8.6 TABLET ORAL at 08:43

## 2022-04-18 RX ADMIN — LISINOPRIL 20 MG: 20 TABLET ORAL at 08:42

## 2022-04-18 RX ADMIN — Medication 3 MG: at 21:09

## 2022-04-18 RX ADMIN — SENNOSIDES AND DOCUSATE SODIUM 1 TABLET: 50; 8.6 TABLET ORAL at 17:24

## 2022-04-18 RX ADMIN — ZIPRASIDONE HCL 40 MG: 20 CAPSULE ORAL at 17:20

## 2022-04-18 RX ADMIN — ZIPRASIDONE HCL 40 MG: 20 CAPSULE ORAL at 08:43

## 2022-04-18 RX ADMIN — LEVOCARNITINE 330 MG: 1 SOLUTION ORAL at 08:42

## 2022-04-18 RX ADMIN — FLUOXETINE 80 MG: 20 CAPSULE ORAL at 08:41

## 2022-04-18 RX ADMIN — Medication 1000 UNITS: at 08:41

## 2022-04-18 RX ADMIN — NYSTATIN 1 APPLICATION: 100000 POWDER TOPICAL at 08:42

## 2022-04-18 RX ADMIN — ATORVASTATIN CALCIUM 40 MG: 40 TABLET, FILM COATED ORAL at 17:21

## 2022-04-18 RX ADMIN — NYSTATIN: 100000 POWDER TOPICAL at 17:20

## 2022-04-18 RX ADMIN — ASPIRIN 81 MG 81 MG: 81 TABLET ORAL at 08:41

## 2022-04-18 RX ADMIN — DIVALPROEX SODIUM 2000 MG: 500 TABLET, EXTENDED RELEASE ORAL at 21:09

## 2022-04-18 NOTE — NURSING NOTE
Pleasant and receptive to approach  Denies any level of depression/ anxiety  Denies lethality or A/V hallucinations  Engages readily with peers  Remains out of room much of shift  Motivated and participates in structured groups  Cooperative with medication administration  Appetite good  Looking forward to d/c and return to facility  Will continue to reinforce positive gains

## 2022-04-18 NOTE — PROGRESS NOTES
04/18/22   Team Meeting   Meeting Type Daily Rounds   Team Members Present   Team Members Present Physician;Nurse;   Physician Team Member Dr Tyler Salgado MD   Nursing Team Member Sherice Carmichael RN   Social Work Team Member Katia Macor [place]   Patient/Family Present   Patient Present No   Patient's Family Present No     DC Tuesday; slept; bright, pleasant, cooperative

## 2022-04-18 NOTE — NURSING NOTE
Presents with restricted affect,baseline  Denies SI and His and agrees to approach staff with any SI's and or prior to self harm:denies AH,VH  Attends groups with participation,divides time between room and common area:pleasant,cooperative and stated she will be getting DC'd tomorrow  She requested and was given Ipad for music,listened near nurses station for approximately 1 5 hours  In addition to documented education we discussed s/s of impending psychological decompensation and when to seek help;she stated,'ok"  Will continue to educate,monitor,and provide safe,therapeutic milieu

## 2022-04-18 NOTE — PROGRESS NOTES
04/18/22 1115   Activity/Group Checklist   Group Wellness   Attendance Attended   Attendance Duration (min) 31-45   Interactions Interacted appropriately   Affect/Mood Appropriate;Calm   Goals Achieved Identified feelings; Identified triggers; Discussed coping strategies; Identified resources and support systems; Able to listen to others; Able to engage in interactions

## 2022-04-18 NOTE — PROGRESS NOTES
Progress Note - Brandy Bernabe 47 y o  female MRN: [id number]    Unit/Bed#[facility] 209-02 Encounter: [id number]        Subjective:   Patient seen and examined at bedside after reviewing the chart and discussing the case with the caring staff  Patient examined at bedside  Patient has no acute complaints  Patient is a possible discharge for Tuesday 04/20/2022  Patient is requesting all her prescriptions  I reviewed and reconciled patient's problem list and medications  Physical Exam   Vitals: Blood pressure 120/57, pulse 80, temperature (!) 97 2 °F (36 2 °C), temperature source Temporal, resp  rate 18, height 5' 6" (1 676 m), weight 118 kg (260 lb 5 8 oz), SpO2 98 %  ,Body mass index is 42 02 kg/m²  Constitutional: Patient appears well-developed  HEENT: PERR, EOMI, MMM  Cardiovascular: Normal rate and regular rhythm  Pulmonary/Chest: Effort normal and breath sounds normal    Abdomen: Soft, + BS, NT, nondistended  Assessment/Plan:  Brandy Bernabe is a(n) 47y o  year old female with bipolar disorder with depression      MEDICAL CLEARANCE  Patient is medically cleared for discharge  All scripts will be sent out for the patient  1  Cardiac with history of hypertension and dyslipidemia  Patient is on lisinopril 20 mg daily, atorvastatin 40 mg daily and aspirin 81 mg daily  2  Carnitine deficiency  Patient is on levocarnitine 330 mg daily  3  Migraine headaches  Patient may get Tylenol/ibuprofen on as-needed basis  4  Seizure disorder  Patient is on Depakote ER 2000 mg at bedtime  5  Constipation  Patient is on MiraLax 17 g daily, Senokot S twice daily, milk of magnesia as needed  6  DJD/osteoarthritis with lower back pain  Patient may continue to get Tylenol and ibuprofen p r n   7  Intertrigo involving the skin folds and groin  Patient has been put on nystatin powder 3 times daily along with dry gauze to keep the area dry  8  Vitamin-D deficiency    Patient is on vitamin D3 1000 units daily

## 2022-04-18 NOTE — PLAN OF CARE
Problem: DISCHARGE PLANNING - CARE MANAGEMENT  Goal: Discharge to post-acute care or home with appropriate resources  Description: INTERVENTIONS:  - Conduct assessment to determine patient/family and health care team treatment goals, and need for post-acute services based on payer coverage, community resources, and patient preferences, and barriers to discharge  - Address psychosocial, clinical, and financial barriers to discharge as identified in assessment in conjunction with the patient/family and health care team  - Arrange appropriate level of post-acute services according to patients   needs and preference and payer coverage in collaboration with the physician and health care team  - Communicate with and update the patient/family, physician, and health care team regarding progress on the discharge plan  - Arrange appropriate transportation to post-acute venues  Outcome: Progressing   PT will D/C to group home tomorrow 4/19/22, will follow up with drea Maxwell for medication management and therapy, and also with PCP

## 2022-04-18 NOTE — PLAN OF CARE
Problem: Ineffective Coping  Goal: Identifies healthy coping skills  Outcome: Progressing     Problem: Ineffective Coping  Goal: Participates in unit activities  Description: Interventions:  - Provide therapeutic environment   - Provide required programming   - Redirect inappropriate behaviors   Outcome: Progressing

## 2022-04-18 NOTE — PROGRESS NOTES
Progress Note - Behavioral Health     Leydi Khan 47 y o  female MRN: [id number]   Unit/Bed#: [facility] 209-02 Encounter: [id number]    Behavior over the last 24 hours: unchanged  Leydi dies admit to over the weekend having fleeting thoughts of self harm, specifically "cutting"  But did not act on these thoughts and able to speak to staff  She reports these thoughts never fully go away, but she is usually able to overcome  She is otherwise denying any suicidal thoughts or hallucinations  Limited participation in milieu  Sleep: slept off and on  Appetite: fair  Medication side effects: No   ROS: all other systems are negative    Mental Status Evaluation:    Appearance:  dressed appropriately   Behavior:  cooperative, calm   Speech:  normal rate, normal volume   Mood:  improved   Affect:  brighter   Thought Process:  goal directed   Associations: concrete associations   Thought Content:  no overt delusions   Perceptual Disturbances: none   Risk Potential: Suicidal ideation - None  Homicidal ideation - None  Potential for aggression - No   Sensorium:  oriented to person, place and time/date   Memory:  recent and remote memory grossly intact   Consciousness:  alert and awake   Attention: attention span and concentration appear shorter than expected for age   Insight:  limited   Judgment: poor   Gait/Station: normal gait/station, normal balance   Motor Activity: no abnormal movements     Vital signs in last 24 hours:    Temp:  [97 2 °F (36 2 °C)-97 7 °F (36 5 °C)] 97 2 °F (36 2 °C)  HR:  [80-87] 80  Resp:  [18] 18  BP: (105-122)/(53-59) 120/57    Laboratory results: I have personally reviewed all pertinent laboratory/tests results        Progress Toward Goals: progressing    Assessment/Plan   Principal Problem:    Bipolar affective disorder, depressed, severe, with psychotic behavior ([email or web address] )  Active Problems:    Primary hypertension    Hyperlipidemia    Class 3 severe obesity due to excess calories without serious comorbidity in adult [facility])    PTSD (post-traumatic stress disorder)    Borderline personality disorder ([facility] )    Seizures ([facility] )    Recommended Treatment:     Planned medication and treatment changes:     All current active medications have been reviewed  Encourage group therapy, milieu therapy and occupational therapy  Behavioral Health checks every 7 minutes  Continue current medications:  Current Facility-Administered Medications   Medication Dose Route Frequency Provider Last Rate    acetaminophen  650 mg Oral Q4H PRN Sukh Jarquin MD      acetaminophen  650 mg Oral Q4H PRN Sukh Jarquin MD      aspirin  81 mg Oral Daily Yanna Malin MD      atorvastatin  40 mg Oral Daily With Marry Wagoner MD      cholecalciferol  1,000 Units Oral Daily Sukh Jarquin MD      divalproex sodium  2,000 mg Oral HS Sukh Jarquin MD      FLUoxetine  80 mg Oral Daily Sukh Jarquin MD      hydrOXYzine HCL  25 mg Oral Q6H PRN Max 4/day Sukh Jarquin MD      ibuprofen  600 mg Oral Q6H PRN Yanna Malin MD      levOCARNitine  330 mg Oral Daily Sukh Jarquin MD      lisinopril  20 mg Oral Daily Yanna Malin MD      LORazepam  1 mg Intramuscular Q6H PRN Max 3/day Sukh Jarquin MD      LORazepam  0 5 mg Oral Q6H PRN Max 4/day Sukh Jarquin MD      LORazepam  1 mg Oral Q6H PRN Max 3/day Sukh Jarquin MD      magnesium hydroxide  30 mL Oral Daily PRN Sarah L Dagnall, PA-C      melatonin  3 mg Oral HS Sukh Jarquin MD      nystatin   Topical TID Yanna Malin MD      OLANZapine  5 mg Intramuscular Q3H PRN Max 3/day Sukh Jarquin MD      polyethylene glycol  17 g Oral Daily Sukh Jarquin MD      QUEtiapine  100 mg Oral Q3H PRN Max 3/day Sukh Jarquin MD      QUEtiapine  25 mg Oral Q6H PRN Max 4/day Sukh Jarquin MD      QUEtiapine  50 mg Oral Q6H PRN Max 4/day Sukh Jarquin MD      senna-docusate sodium  1 tablet Oral BID Sarah L Dagnall, PA-C      traZODone  50 mg Oral HS PRN Sukh Jarquin MD      ziprasidone  40 mg Oral BID With Meals Cherelle Peguero MD         Risks / Benefits of Treatment:    Risks, benefits, and possible side effects of medications explained to patient and patient verbalizes understanding and agreement for treatment  Counseling / Coordination of Care:    Patient's progress discussed with staff in treatment team meeting  Medications, treatment progress and treatment plan reviewed with patient      SHANI Kenyon 04/18/22

## 2022-04-18 NOTE — SOCIAL WORK
CM placed follow up call to [facility] group home [phone]  Spoke with Suha García  updated him on PT status and plan of care, Suha Raquel reflected he understood  He confirmed receipt ov voicemail left on Friday indicating that we were looking at D/C on Tuesday  Suha García indicated that his supervisor indicated they require 24 hour notice and that [facility], negative covid test, general D/C papers received  CM indicated that voicemail was left Friday informing of PT planned D/C, scripts were sent to pharmacy, she will order covid stat and will have results tomorrow in which she will fax  CM faxed medications list today, and informed Suha García that she will send PT D/C summary tomorrow at time of D/C, Suha García in agreement with this  Suha García indicated he will follow up with team on plan for D/C  Suha García indiacted that once they receive negative covid test the [facility] will schedule transport  Suha García will call Cm in the am once team reviews and schedules transport  Fax: [phone] attention Vinny Vincent  Call ended mutually  CM placed call to [facility], spoke with Corine Donovan [phone]  Notified of PT scheduled D/C  PT is scheduled for follow up on 4/20/22 at 11:50am, with Dr Goldie Baltazar MD  Transferred to therapist Priyank Galan, [facility] updated [place] on PT status and plan of care along with planned D/C, [place] Jose Angel has PT scheduled for session on 4/20/22 at 11:00am  Call ended mutually  CM placed call to PCP office Christel Otero MD office, [phone] spoke with Brooklynn Valdovinos  Notified of PT scheduled D/C tomorrow and scheduled follow up on 4/25/22 at 1:10pm, call ended mutually

## 2022-04-18 NOTE — DISCHARGE INSTR - OTHER ORDERS
You are being discharged to your home located at [address] located at [address] [facility] [address] [id number], Phone [phone]  Triggers you have identified during your hospitalization that led to your admission distressed mood, regression in mental health  Coping skills you have identified during your hospitalization include talking, coloring, walking, music  If you are unable to deal with your distressed mood alone please contact a member of your group home team at [facility] at , your provider or therapist at [facility] at , your primary care provider Dr Talia Pierre MD at   If that is not effective and you continue to have (ex: suicidal ideation, homicidal ideation, distressed mood, overwhelmed, in crisis) please contact (Crisis #) Faheem Shi 107: [phone], dial 911 or go to the nearest emergency center  [facility] [place] Crisis Hotline: 490.294.1743  *National Suicide Prevention Lifeline:  3-947.353.5462   Alcohol Anonymous: [address] [employer] [facility] [employer] (975) 9044-500 on [address] [facility] [employer]INE: 825.844.1030/Website: www hardeep org  *Substance Abuse and [address] [facility] [employer], which is a confidential, free, 24-hour-a-day, 365-day-a-year, information service for individuals and family members facing mental health and/or substance use disorders  This service provides referrals to local treatment facilities, support groups, and community-based organizations  Callers can also order free publications and other information    Call 9-141.123.5845/Website: www [facility] gov  *[employer] 2-1-1: This is a toll free, confidential, 24-hour-a-day service which connects you to a community  in your area who can help you find services and resources that are available to you locally and provide critical services that can improve and save lives   Call: 211  Elena Anderson: https://[email or web address]/       Merlin Melody, RN, our [employer], will be calling you after your discharge, on the phone number that you provided  She will be available as an additional support, if needed  If you wish to speak with her, you may contact Sahil Guevara at [phone]

## 2022-04-18 NOTE — DISCHARGE INSTRUCTIONS
Fluoxetine (By mouth)   Fluoxetine (euip-LF-n-teen)  Treats depression, obsessive-compulsive disorder (OCD), bulimia nervosa, and panic disorder  This medicine is an SSRI  Brand Name(s): PROzac   There may be other brand names for this medicine  When This Medicine Should Not Be Used: This medicine is not right for everyone  Do not use it if you had an allergic reaction to fluoxetine  How to Use This Medicine:   Capsule, Delayed Release Capsule, Liquid, Tablet  · Take your medicine as directed  Your dose may need to be changed several times to find what works best for you  Take your medicine at the same time each day  · You may need to take this medicine for a month or longer before you feel better  If you feel that the medicine is not working well, do not take more than your normal dose  Call your doctor for instructions  · Measure the oral liquid medicine with a marked measuring spoon, oral syringe, or medicine cup  · Delayed-release capsule: Swallow whole  Do not crush, break, or chew it  · This medicine should come with a Medication Guide  Ask your pharmacist for a copy if you do not have one  · Missed dose: Take a dose as soon as you remember  If it is almost time for your next dose, wait until then and take a regular dose  Do not take extra medicine to make up for a missed dose  · Store the medicine in a closed container at room temperature, away from heat, moisture, and direct light  Drugs and Foods to Avoid:   Ask your doctor or pharmacist before using any other medicine, including over-the-counter medicines, vitamins, and herbal products  · Do not use this medicine together with pimozide or thioridazine  Do not use this medicine within 14 days of using an MAO inhibitor, and do not start an MAOI for at least 5 weeks after you stop using fluoxetine  · Some medicines can affect how fluoxetine works  Tell your doctor if you are using any of the following:  ?  Buspirone, carbamazepine, dolasetron, erythromycin, fentanyl, gatifloxacin, lithium, mefloquine, methadone, moxifloxacin, pentamidine, phenytoin, probucol, Javier's wort, tacrolimus, tramadol, vinblastine  ? Amphetamines  ? Blood thinner (including warfarin)  ? Diuretic (water pill)  ? Medicine for heart rhythm problems  ? Medicine to treat mental illness (including chlorpromazine, droperidol, iloperidone, ziprasidone)  ? NSAID pain or arthritis medicine (including aspirin, celecoxib, diclofenac, ibuprofen, naproxen)  ? Phenothiazine medicine  ? Triptan medicine to treat migraine headache  ? Tryptophan supplements  · Do not drink alcohol while you are using this medicine  · Tell your doctor if you use anything else that makes you sleepy  Some examples are allergy medicine, narcotic pain medicine, and alcohol  Warnings While Using This Medicine:   · Tell your doctor if you are pregnant or breastfeeding, or if you have kidney disease, liver disease, bleeding problems, diabetes, glaucoma, or a history of seizures  Tell your doctor if you have had heart disease, a heart rhythm problem (including QT prolongation), heart attack, heart failure, low blood pressure, or a stroke  · For some children, teenagers, and young adults, this medicine may increase mental or emotional problems  This may lead to thoughts of suicide and violence  Talk with your doctor right away if you have any thoughts or behavior changes that concern you  Tell your doctor if you or anyone in your family has a history of bipolar disorder or suicide attempts  · This medicine may cause the following problems:  ? Serotonin syndrome (may be life-threatening when used with certain other medicines)  ? Increased risk of bleeding problems  ? Heart rhythm problems, including QT prolongation  ? Sexual problems  · Do not stop using this medicine suddenly  Your doctor will need to slowly decrease your dose before you stop it completely    · This medicine may make you dizzy or drowsy  Do not drive or do anything else that could be dangerous until you know how this medicine affects you  · Your doctor will do lab tests at regular visits to check on the effects of this medicine  Keep all appointments  · Keep all medicine out of the reach of children  Never share your medicine with anyone  Possible Side Effects While Using This Medicine:   Call your doctor right away if you notice any of these side effects:  · Allergic reaction: Itching or hives, swelling in your face or hands, swelling or tingling in your mouth or throat, chest tightness, trouble breathing  · Anxiety, restlessness, fever, sweating, muscle spasms, nausea, vomiting, diarrhea, seeing or hearing things that are not there  · Changes in behavior, thoughts of hurting yourself or others  · Confusion, weakness, muscle twitching  · Eye pain, vision changes, seeing halos around lights  · Fast, pounding, or uneven heartbeat, dizziness  · Loss in sexual ability, desire, drive, or performance, delayed or inability to have an orgasm, inability to have or keep an erection  · Trouble keeping still, feeling restless and agitated, racing thoughts, excessive energy, trouble sleeping  · Unusual bleeding or bruising  If you notice these less serious side effects, talk with your doctor:   · Diarrhea, changes in appetite, weight gain or loss  · Drowsiness, sleepiness  · Dry mouth  · Lack or loss of strength  If you notice other side effects that you think are caused by this medicine, tell your doctor  Call your doctor for medical advice about side effects  You may report side effects to FDA at 7-761-FDA-0804    © Copyright [employer] 2022 Information is for End User's use only and may not be sold, redistributed or otherwise used for commercial purposes  The above information is an  only  It is not intended as medical advice for individual conditions or treatments   Talk to your doctor, nurse or pharmacist before following any medical regimen to see if it is safe and effective for you  Ziprasidone (By mouth)   Ziprasidone (nh-OAXC-y-done)  Treats schizophrenia and bipolar disorder  Brand Name(s): Geodon   There may be other brand names for this medicine  When This Medicine Should Not Be Used: This medicine is not right for everyone  Do not use it if you had an allergic reaction to ziprasidone  How to Use This Medicine:   Capsule  · Take your medicine as directed  Your dose may need to be changed several times to find what works best for you  · It is best to take this medicine with food at the same time every day  · Swallow the capsule whole  Do not break, crush, or chew it  · Read and follow the patient instructions that come with this medicine  Talk to your doctor or pharmacist if you have any questions  · Missed dose: Take a dose as soon as you remember  If it is almost time for your next dose, wait until then and take a regular dose  Do not take extra medicine to make up for a missed dose  · Store the medicine in a closed container at room temperature, away from heat, moisture, and direct light  Drugs and Foods to Avoid:   Ask your doctor or pharmacist before using any other medicine, including over-the-counter medicines, vitamins, and herbal products  · Do not use this medicine together with amiodarone, arsenic trioxide, chlorpromazine, disopyramide, dofetilide, dolasetron mesylate, droperidol, gatifloxacin, halofantrine, levomethadyl acetate, mefloquine, mesoridazine, moxifloxacin, pentamidine, pimozide, probucol, procainamide, quinidine, sotalol, sparfloxacin, tacrolimus, or thioridazine  · Some medicines can affect how ziprasidone works  Tell your doctor if you are using any of the following:  ? Carbamazepine, ketoconazole, levodopa  ? Blood pressure medicine  ?  Diuretic (water pill)  Warnings While Using This Medicine:   · Tell your doctor if you are pregnant or breastfeeding, or if you have liver disease, blood or bone marrow problems, diabetes, high cholesterol, trouble swallowing, or a history of seizures or breast cancer  Tell your doctor if you have heart rhythm problems (such as QT prolongation) or any heart or blood vessel problems, including low blood pressure, heart failure, or a history of a heart attack  · This medicine may cause the following problems:  ? Heart rhythm problems  ? Neuroleptic malignant syndrome (possibly life-threatening neurological disorder)  ? Drug reaction with eosinophilia and systemic symptoms (DRESS), which can damage organs such as the liver, kidney, or heart  ? Serious skin reactions  ? Tardive dyskinesia (trouble controlling muscle movements)  · This medicine may make you dizzy or drowsy, or may cause trouble with thinking or controlling body movements, which may lead to falls, fractures, or other injuries  Do not drive or do anything else that could be dangerous until you know how this medicine affects you  Stand or sit up slowly if you feel lightheaded or dizzy  · You may get overheated more easily while you are using this medicine  Use caution when you exercise strenuously or are outside in hot weather  Drink plenty of water to stay hydrated  · Your doctor will do lab tests at regular visits to check on the effects of this medicine  Keep all appointments  · Keep all medicine out of the reach of children  Never share your medicine with anyone    Possible Side Effects While Using This Medicine:   Call your doctor right away if you notice any of these side effects:  · Allergic reaction: Itching or hives, swelling in your face or hands, swelling or tingling in your mouth or throat, chest tightness, trouble breathing  · Blistering, peeling, red skin rash  · Chills, cough, sore throat, and body aches  · Fast, slow, pounding, or uneven heartbeat  · Fever, skin rash, or swollen glands in your armpits, neck, or groin  · Fever, sweating, confusion, muscle stiffness, seizures  · Increased thirst, hunger, or urination  · Lightheadedness, dizziness, or fainting  · Painful, prolonged erection of your penis  · Twitching or muscle movements you cannot control (especially in your face, tongue, or jaw)  · Unusual bleeding, bruising, or weakness  If you notice these less serious side effects, talk with your doctor:   · Nausea  · Sleepiness, tiredness  · Stuffy or runny nose  · Weight gain  If you notice other side effects that you think are caused by this medicine, tell your doctor  Call your doctor for medical advice about side effects  You may report side effects to FDA at 0-472-VOW-6954    © Copyright [employer] 2022 Information is for End User's use only and may not be sold, redistributed or otherwise used for commercial purposes  The above information is an  only  It is not intended as medical advice for individual conditions or treatments  Talk to your doctor, nurse or pharmacist before following any medical regimen to see if it is safe and effective for you  Trazodone (By mouth)   Trazodone (TRAZ-oh-done)  Treats depression  Brand Name(s):   There may be other brand names for this medicine  When This Medicine Should Not Be Used: This medicine is not right for everyone  Do not use it if you had an allergic reaction to trazodone  How to Use This Medicine:   Tablet, Long Acting Tablet  · Take your medicine as directed  Your dose may need to be changed several times to find what works best for you  · Regular tablet: Take it with or shortly after a meal or light snack  · Extended-release tablet: Take it at the same time each day, preferably at bedtime, without food  · The tablet can be swallowed whole, or you may break the tablet in half along the score line  Do not break the tablet unless your doctor tells you to  Do not crush or chew the tablet  · This medicine should come with a Medication Guide   Ask your pharmacist for a copy if you do not have one  · Missed dose: Take a dose as soon as you remember  If it is almost time for your next dose, wait until then and take a regular dose  Do not take extra medicine to make up for a missed dose  · Store the medicine in a closed container at room temperature, away from heat, moisture, and direct light  Drugs and Foods to Avoid:   Ask your doctor or pharmacist before using any other medicine, including over-the-counter medicines, vitamins, and herbal products  · Do not use trazodone if you currently take an MAO inhibitor (MAOI) or have used an MAOI in the past 14 days  · Tell your doctor if you also use any of the following:  ? Carbamazepine, digoxin, phenytoin, indinavir, ritonavir, buspirone, fentanyl, lithium, tryptophan, Javier's wort, tramadol  ? Medicine to treat a fungal infection (such as itraconazole, ketoconazole), a diuretic (water pill), blood pressure medicine, an NSAID pain or arthritis medicine (such as aspirin, celecoxib, diclofenac, ibuprofen, naproxen), a blood thinner (such as warfarin), other medicine for depression, or triptan medicine to treat migraine headaches  · Do not drink alcohol while you are using this medicine  · Tell your doctor if you use anything else that makes you sleepy  Some examples are allergy medicine, narcotic pain medicine, and alcohol  Warnings While Using This Medicine:   · Tell your doctor if you are pregnant or breastfeeding, or if you have kidney disease, liver disease, bleeding problems, glaucoma, heart disease, heart rhythm problems, or low blood pressure  Tell your doctor if you recently had a heart attack  · For some children, teenagers, and young adults, this medicine may increase mental or emotional problems  This may lead to thoughts of suicide and violence  Talk with your doctor right away if you have any thoughts or behavior changes that concern you   Tell your doctor if you or anyone in your family has a history of bipolar disorder or suicide attempts  · This medicine may cause the following problems:  ? Serotonin syndrome (more likely when used with certain other medicines)  ? Heart rhythm problems (QT prolongation)  ? Low sodium levels  ? Higher risk of bleeding  · Do not stop using this medicine suddenly  Your doctor will need to slowly decrease your dose before you stop it completely  · This medicine may make you dizzy or drowsy  Do not drive or do anything that could be dangerous until you know how this medicine affects you  Stand or sit up slowly if you are dizzy  · Tell any doctor or dentist who treats you that you are using this medicine  You may need to stop using this medicine several days before you have surgery or medical tests  · Your doctor will check your progress and the effects of this medicine at regular visits  Keep all appointments  · Keep all medicine out of the reach of children  Never share your medicine with anyone    Possible Side Effects While Using This Medicine:   Call your doctor right away if you notice any of these side effects:  · Allergic reaction: Itching or hives, swelling in your face or hands, swelling or tingling in your mouth or throat, chest tightness, trouble breathing  · Anxiety, restlessness, fever, sweating, muscle spasms, nausea, vomiting, diarrhea, seeing or hearing things that are not there  · Confusion, weakness, muscle twitching  · Fast, pounding, or uneven heartbeat  · Lightheadedness, dizziness, fainting  · Painful, prolonged erection of your penis  · Sudden increase in energy, feeling irritable, trouble sleeping  · Thoughts of hurting yourself or others, unusual behavior  · Unusual bleeding or bruising  If you notice these less serious side effects, talk with your doctor:   · Constipation, mild nausea  · Dry mouth  · Eye pain, vision changes, seeing halos around lights  · Headache  · Sleepiness or unusual drowsiness  If you notice other side effects that you think are caused by this medicine, tell your doctor  Call your doctor for medical advice about side effects  You may report side effects to FDA at 8-515-FDA-6301    © Copyright [address] 2022 Information is for End User's use only and may not be sold, redistributed or otherwise used for commercial purposes  The above information is an  only  It is not intended as medical advice for individual conditions or treatments  Talk to your doctor, nurse or pharmacist before following any medical regimen to see if it is safe and effective for you  Bipolar Disorder   AMBULATORY CARE:   Bipolar disorder  is a long-term chemical imbalance that causes rapid changes in mood and behavior  High moods are called otoniel  Low moods are called depression  Sometimes you will feel manic and sometimes you will feel depressed  You can have alternating episodes of otoniel and depression  This is called a mixed bipolar state  Call 911 if:   · You think about hurting yourself or someone else  Contact your healthcare provider or psychiatrist if:   · You are having trouble managing your bipolar disorder  · You cannot sleep, or are sleeping all the time  · You cannot eat, or are eating more than usual     · You feel dizzy or your stomach is upset  · You cannot make it to your next meeting  · You have questions or concerns about your condition or care      Common signs and symptoms of otoniel:   · Being easily distracted or agitated, or focusing all your attention on a goal    · Insomnia (trouble sleeping) or not needing as much sleep as usual    · Inflated self-esteem or belief in abilities    · Racing thoughts that may not make sense or be understood by others    · Speech that is faster than usual, or you talk more than usual    · Increased thoughts about sex    · Happy and care free, with a sudden change to anger or irritability    · Hallucinations that cause you to see and hear things that are not really there    Common signs and symptoms of depression:   · Anger, worry, anxiousness, or irritability    · Lack of energy    · Sadness or emptiness    · Crying for long periods    · Low self-esteem or sense of worthlessness    · Negative thoughts or not caring about anything    · Too much or too little sleep    Treatment  for bipolar disorder may include medicines to control your mood swings  You may need to see a therapist or psychiatrist regularly for counseling  You may need to go into the hospital for tests and treatment  Follow up with your healthcare provider or psychiatrist as directed:  Write down your questions so you remember to ask them during your visits  Manage bipolar disorder:  Watch for triggers of bipolar disorder symptoms, such as stress  Learn new ways to relax, such as deep breathing, to manage your stress  Tell someone if you feel a manic or depressive period might be coming on  Ask a friend or family member to help watch you for bipolar symptoms  Work to develop skills that will help you manage bipolar disorder  You may need to make lifestyle changes  Ask your healthcare provider or psychiatrist for resources  © Copyright [employer] 2022 Information is for End User's use only and may not be sold, redistributed or otherwise used for commercial purposes  All illustrations and images included in CareNotes® are the copyrighted property of [employer]  or [employer]   The above information is an  only  It is not intended as medical advice for individual conditions or treatments  Talk to your doctor, nurse or pharmacist before following any medical regimen to see if it is safe and effective for you  Divalproex (By mouth)   Divalproex Sodium (dye-PERNELL-proe-ex SHAWN-brandi-um)  Treats seizures  Also treats bipolar disorder and helps prevent migraine headaches  Brand Name(s): Depakote, Depakote ER, Depakote Sprinkles   There may be other brand names for this medicine    When This Medicine Should Not Be Used:   This medicine is not right for everyone  Do not use it if you had an allergic reaction to divalproex, valproate sodium, or valproic acid  Do not use it to prevent migraine headaches if you are pregnant, or if you have liver disease, or certain genetic disorders (including urea cycle disorder or mitochondrial disorder)  How to Use This Medicine:   Delayed Release Capsule, Delayed Release Tablet, Coated Tablet, Long Acting Tablet  · Take your medicine as directed  Your dose may need to be changed several times to find what works best for you  · You may take this medicine with food to decrease stomach upset  · Capsule, tablet, or extended-release tablet: Swallow the medicine whole  Do not crush, break, or chew it  · Sprinkle capsule: You may open the capsule and pour the medicine onto a teaspoonful of soft food (including pudding or applesauce)  Stir this mixture well and swallow it without chewing  · Part of the medicine may pass into your stool after your body has absorbed the medicine  Check with your doctor right away if this happens  · This medicine should come with a Medication Guide  Ask your pharmacist for a copy if you do not have one  · Missed dose: Take a dose as soon as you remember  If it is almost time for your next dose, wait until then and take a regular dose  Do not take extra medicine to make up for a missed dose  If you miss 2 or more doses, call your doctor  · Store the medicine in a closed container at room temperature, away from heat, moisture, and direct light  Drugs and Foods to Avoid:   Ask your doctor or pharmacist before using any other medicine, including over-the-counter medicines, vitamins, and herbal products  · Some medicines can affect how divalproex sodium works   Tell your doctor if you are using any of the following:   ? Amitriptyline, aspirin, chlorpromazine, clonazepam, diazepam, lorazepam, nortriptyline, propofol, rifampin, ritonavir, rufinamide, tolbutamide, zidovudine  ? Birth control pill  ? Blood thinner (including warfarin)  ? Carbapenem antibiotic (including ertapenem, imipenem, meropenem)  ? Other seizure medicines (including carbamazepine, ethosuximide, felbamate, lamotrigine, phenobarbital, phenytoin, primidone, topiramate)  · Alcohol, narcotic pain relievers, or sleeping pills may cause you to feel more lightheaded, dizzy, or faint when used together with this medicine  Warnings While Using This Medicine:   · It is not safe to take this medicine during pregnancy  It could harm an unborn baby  Tell your doctor right away if you become pregnant  · Tell your doctor if you are breastfeeding, or if you have kidney disease, blood disease, pancreas problems, a viral infection (including HIV or cytomegalovirus infection), or a history of depression or mental health problems  · This medicine may cause the following problems:  ? Liver problems  ? Pancreatitis (swelling of the pancreas)  ? Hyperammonemic encephalopathy (too much ammonia in your blood)  ? Depression or thoughts of suicide  ? Bleeding problems (including thrombocytopenia)  ? Hypothermia (low body temperature)  ? Drug reaction with eosinophilia and systemic symptoms (DRESS), which may damage the organs, including the liver, kidney, or heart  · This medicine may make you dizzy or drowsy  Do not drive or do anything else that could be dangerous until you know how this medicine affects you  · Talk with your doctor before using this medicine if you plan to have children  Some men who use this medicine have become infertile (unable to have children)  · Do not stop using this medicine suddenly  Your doctor will need to slowly decrease your dose before you stop it completely  · Tell any doctor or dentist who treats you that you are using this medicine  This medicine may affect certain medical test results  · Your doctor will do lab tests at regular visits to check on the effects of this medicine   Keep all appointments  · Keep all medicine out of the reach of children  Never share your medicine with anyone  Possible Side Effects While Using This Medicine:   Call your doctor right away if you notice any of these side effects:  · Allergic reaction: Itching or hives, swelling in your face or hands, swelling or tingling in your mouth or throat, chest tightness, trouble breathing  · Blistering, peeling, red skin rash  · Confusion, problems with memory, unusual drowsiness, clumsiness  · Dark urine or pale stools, loss of appetite, stomach pain, yellow skin or eyes  · Fever, rash, swollen glands in the neck, armpit, or groin  · Sudden and severe stomach pain, nausea, vomiting, lightheadedness  · Thoughts of hurting yourself, depression, unusual changes in behavior or moods  · Tiny red dots on the skin, especially on the lower legs  · Unusual bleeding, bruising, or weakness  If you notice these less serious side effects, talk with your doctor:   · Diarrhea, stomach upset  · Hair loss  · Tiredness, sleepiness  · Trouble sleeping, tremor  · Vision changes, dizziness, headache  If you notice other side effects that you think are caused by this medicine, tell your doctor  Call your doctor for medical advice about side effects  You may report side effects to FDA at 1-643-FDA-3136    © Copyright [employer] 2022 Information is for End User's use only and may not be sold, redistributed or otherwise used for commercial purposes  The above information is an  only  It is not intended as medical advice for individual conditions or treatments  Talk to your doctor, nurse or pharmacist before following any medical regimen to see if it is safe and effective for you

## 2022-04-18 NOTE — NURSING NOTE
Pt present and visible in the milieu during the duration of the shift socializing with other peers in the dining room  Pt went back to her room and took her medications without issue  Pt affect is bright and pt states she's happy and excited to return to her group home so she could see her boyfriend  Pt had no si, hi, ah, vh  Pt denied anxiety and depression at this time  No concerns or complaints  Continuous visual safety checks performed throughout the shift  Safety precautions maintained  Will continue to monitor

## 2022-04-19 VITALS
RESPIRATION RATE: 22 BRPM | HEIGHT: 66 IN | HEART RATE: 80 BPM | WEIGHT: 260.36 LBS | BODY MASS INDEX: 41.84 KG/M2 | DIASTOLIC BLOOD PRESSURE: 61 MMHG | SYSTOLIC BLOOD PRESSURE: 129 MMHG | TEMPERATURE: 98 F | OXYGEN SATURATION: 97 %

## 2022-04-19 PROBLEM — R56.9 SEIZURES (HCC): Status: RESOLVED | Noted: 2021-12-06 | Resolved: 2022-04-19

## 2022-04-19 PROBLEM — F31.5 BIPOLAR AFFECTIVE DISORDER, DEPRESSED, SEVERE, WITH PSYCHOTIC BEHAVIOR (HCC): Status: RESOLVED | Noted: 2021-10-10 | Resolved: 2022-04-19

## 2022-04-19 PROCEDURE — 99238 HOSP IP/OBS DSCHRG MGMT 30/<: CPT | Performed by: PSYCHIATRY & NEUROLOGY

## 2022-04-19 RX ORDER — FLUOXETINE HYDROCHLORIDE 40 MG/1
80 CAPSULE ORAL DAILY
Qty: 60 CAPSULE | Refills: 0 | Status: SHIPPED | OUTPATIENT
Start: 2022-04-19 | End: 2022-05-09 | Stop reason: HOSPADM

## 2022-04-19 RX ORDER — LANOLIN ALCOHOL/MO/W.PET/CERES
3 CREAM (GRAM) TOPICAL
Qty: 30 TABLET | Refills: 0 | Status: SHIPPED | OUTPATIENT
Start: 2022-04-19 | End: 2022-05-09 | Stop reason: HOSPADM

## 2022-04-19 RX ORDER — ZIPRASIDONE HYDROCHLORIDE 40 MG/1
40 CAPSULE ORAL 2 TIMES DAILY WITH MEALS
Qty: 60 CAPSULE | Refills: 0 | Status: SHIPPED | OUTPATIENT
Start: 2022-04-19 | End: 2022-05-09 | Stop reason: HOSPADM

## 2022-04-19 RX ORDER — DIVALPROEX SODIUM 500 MG/1
2000 TABLET, EXTENDED RELEASE ORAL
Qty: 120 TABLET | Refills: 0 | Status: SHIPPED | OUTPATIENT
Start: 2022-04-19 | End: 2022-05-09 | Stop reason: HOSPADM

## 2022-04-19 RX ADMIN — SENNOSIDES AND DOCUSATE SODIUM 1 TABLET: 50; 8.6 TABLET ORAL at 08:00

## 2022-04-19 RX ADMIN — LEVOCARNITINE 330 MG: 1 SOLUTION ORAL at 08:01

## 2022-04-19 RX ADMIN — Medication 1000 UNITS: at 08:01

## 2022-04-19 RX ADMIN — FLUOXETINE 80 MG: 20 CAPSULE ORAL at 08:01

## 2022-04-19 RX ADMIN — ASPIRIN 81 MG 81 MG: 81 TABLET ORAL at 08:00

## 2022-04-19 RX ADMIN — LISINOPRIL 20 MG: 20 TABLET ORAL at 08:00

## 2022-04-19 RX ADMIN — ZIPRASIDONE HCL 40 MG: 20 CAPSULE ORAL at 08:01

## 2022-04-19 NOTE — NURSING NOTE
Pt up ad saima & gait is steady  Pt denies any depression or anxiety  Pt denies any hallucinations, suicidal or homicidal ideations  Q 7 min checks maintained to monitor pt's behavior & safety  Pt is pleasant & socializes with other patients  Pt is cooperative & compliant with medications  Pt to be discharged today

## 2022-04-19 NOTE — SOCIAL WORK
CM received call from Christy Flannery with [facility]  group [place] whom indicated that they did not receive faxes although CM received confirmation on her end  CM inquired if there is an alternative fax to send to  Christy Flannery indicated no, CM scanned and sent secure email to Christy Flannery negative covid results and medication list  Christy Flannery is unable to open email  CM resent documentation via fax [phone]  Christy Flannery confirmed receipt of faxed information  Scheduled for 2pm transportm, their team will  PT  Call ended mutually

## 2022-04-19 NOTE — BH TRANSITION RECORD
Contact Information: If you have any questions, concerns, pended studies, tests and/or procedures, or emergencies regarding your inpatient behavioral health visit  Please contact [facility] unit [phone] and ask to speak to a , nurse or physician  A contact is available 24 hours/ 7 days a week at this number  Summary of Procedures Performed During your Stay:  Below is a list of major procedures performed during your hospital stay and a summary of results:  - No major procedures performed  Pending Studies (From admission, onward)    None        If studies are pending at discharge, follow up with your PCP and/or referring provider

## 2022-04-19 NOTE — SOCIAL WORK
ROSE spoke with Marlaine Siemens this am with [address] [phone]  CM indicated that she faxed negative covid test results this am  Marlaine Siemens will follow up with his team to schedule transport for after 1pm today and that he will call the CM with the scheduled time  CM encouraged Marlaine Siemens to reach out with any needs or concerns, Marlaine Siemens in agreement, call ended mutually

## 2022-04-19 NOTE — NURSING NOTE
Pt instructed on discharge instructions & medications; & verbalized understanding of instructions  Pt discharged via ambulation via main entrance of hospital accompanied by BHT & belongings sent with pt  Pt transported back to Group home via car

## 2022-04-19 NOTE — PROGRESS NOTES
04/19/22 1030   Activity/Group Checklist   Group Wellness   Attendance Attended   Attendance Duration (min) 46-60   Interactions Interacted appropriately   Affect/Mood Appropriate   Goals Achieved Identified feelings; Discussed coping strategies; Able to listen to others; Able to engage in interactions; Able to self-disclose; Able to recieve feedback

## 2022-04-19 NOTE — PROGRESS NOTES
04/19/22   Team Meeting   Meeting Type Daily Rounds   Team Members Present   Team Members Present Physician;Nurse;;; Occupational Therapist   Physician Team Member Dr Hernesto Metzger MD   Nursing Team Member Lady Tapia RN   Care Management Team Member Cindy Jenkins , [facility], [facility], [place]   Social Work Team Member Suri Myers [facility]   OT Team Member Kt Tabor [place]   Patient/Family Present   Patient Present No   Patient's Family Present No     DC today; denies all symptoms; med compliant

## 2022-04-19 NOTE — DISCHARGE SUMMARY
Discharge Summary - [facility] 47 y o  female MRN: [id number]  Unit/Bed#: Issa James 209-02 Encounter: [id number]  This note was not shared with the patient due to reasonable likelihood of causing patient harm     Admission Date: 4/11/2022         Discharge Date: 4/19/2022    Attending Psychiatrist: Sam Graff MD    Reason for Admission/HPI: Bipolar 1 disorder [facility]) [F31 9]  Depression [F32  A]    History of Present Illness       Snehal Verma is a 47 y o  female with a history of Bipolar Disorder, PTSD and Borderline Personality Disorder who was admitted to the inpatient older adult psychiatric unit on a voluntary 201 commitment basis due to depression, anxiety, unstable mood, auditory hallucinations and suicidal ideation with plan to cut self or walk in front of a car  Pt presented initially to the ER with a staff from [facility] after she cut superficially her forearm multiple times with thumb tack   On evaluation in the inpatient psychiatric unit pt presents limited historian but admits that the presenting issue that brought her to the hospital was increasing SI and AH telling her to hurt herself  Pt states that she has SI with a plan to "walk in front of a moving car " Pt claims ongoing [address] that tell her to hurt herself but not anyone else  She denies any active plan or intent to hurt herself and she is able to contract for safety presently  Pt shares that she has been inpatient hospitalized at least 20x and that she has experienced the same plan and the cutting as well multiple times  Pt reports the precipitating event was a belief that her boyfriend no longer wants to be with her  Pt discloses upon questioning that he has neither said or done anything that would indicate to her that this was a rational fear but states that "she just knows " She denies any history of substance abuse, smoking, or engaging in street drugs   Pt states that her appetite is good but her sleep has been disruptive for the last several days  She agrees to be compliant with medication and treatment plan the unit  Hospital Course: The patient was admitted to the inpatient psychiatric unit and started on every 7 minutes precautions  During the hospitalization the patient was attending individual therapy, group therapy, milieu therapy and occupational therapy  Psychiatric medications were titrated over the hospital stay  To address depression, mood instability, auditory hallucinations and insomnia the patient was started on antidepressant Prozac, mood stabilizer Depakote ER, antipsychotic medication Geodon and hypnotic medication Melatonin  Medication doses were titrated during the hospital course  Wellbutrin was gradually taper off due to reported h/o seizure episodes in the past  Depakote level was 92 done on 4/14/2022  Prior to beginning of treatment medications risks and benefits and possible side effects including risk of liver impairment related to treatment with Depakote, risk of parkinsonian symptoms, Tardive Dyskinesia and metabolic syndrome related to treatment with antipsychotic medications, risk of cardiovascular events in elderly related to treatment with antipsychotic medications and risk of suicidality and serotonin syndrome related to treatment with antidepressants were reviewed with the patient  The patient verbalized understanding and agreement for treatment  Patient's symptoms improved gradually over the hospital course  At the end of treatment the patient was doing well  Mood was stable at the time of discharge  The patient denied suicidal ideation, intent or plan at the time of discharge and denied homicidal ideation, intent or plan at the time of discharge  There was no overt psychosis at the time of discharge  Sleep and appetite were improved  The patient was tolerating medications and was not reporting any significant side effects at the time of discharge      Since the patient was doing well at the end of the hospitalization, treatment team felt that the patient could be safely discharged back to [facility]  The outpatient follow up was arranged by the unit  upon discharge  Mental Status at time of Discharge:     Appearance:  age appropriate   Behavior:  cooperative   Speech:  normal volume   Mood:  euthymic   Affect:  mood-congruent   Thought Process:  goal directed   Thought Content:  no overt delusions   Perceptual Disturbances: None   Risk Potential: Suicidal Ideations none, HI none   Sensorium:  person, place and time/date   Cognition:  recent and remote memory grossly intact   Consciousness:  alert and awake    Attention: attention span and concentration were age appropriate   Insight:  fair   Judgment: fair   Gait/Station: normal gait/station   Motor Activity: no abnormal movements     Admission Diagnosis:Bipolar 1 disorder ([facility] ) [F31 9]  Depression [T18  A]    Discharge Diagnosis:   Principal Problem (Resolved):    Bipolar affective disorder, depressed, severe, with psychotic behavior ([facility] )  Active Problems:    Primary hypertension    Hyperlipidemia    Class 3 severe obesity due to excess calories without serious comorbidity in [facility])    PTSD (post-traumatic stress disorder)    Borderline personality disorder ([facility] )  Resolved Problems:    Seizures ([facility] )      Lab results:  Admission on 04/11/2022   Component Date Value    Vitamin B-12 04/12/2022 756     Vit D, 25-Hydroxy 04/12/2022 38 6     Folate 04/12/2022 11 1     Valproic Acid, Total 04/14/2022 92     SARS-CoV-2 04/18/2022 Negative     INFLUENZA A PCR 04/18/2022 Negative     INFLUENZA B PCR 04/18/2022 Negative     RSV PCR 04/18/2022 Negative        Discharge Medications:  Current Discharge Medication List      START taking these medications    Details   ibuprofen (MOTRIN) 600 mg tablet Take 1 tablet (600 mg total) by mouth every 6 (six) hours as needed for moderate pain  Qty: 100 tablet, Refills: 0 Associated Diagnoses: DJD (degenerative joint disease)      levOCARNitine (CARNITOR) 1 g/10 mL solution Take 3 3 mL (330 mg total) by mouth in the morning  Qty: 100 mL, Refills: 0    Associated Diagnoses: Carnitine deficiency (HCC)      nystatin (MYCOSTATIN) powder Apply topically 3 (three) times a day  Qty: 15 g, Refills: 0    Associated Diagnoses: Intertrigo            Current Discharge Medication List      STOP taking these medications       buPROPion (WELLBUTRIN XL) 300 mg 24 hr tablet Comments:   Reason for Stopping:         hydrOXYzine pamoate (VISTARIL) 50 mg capsule Comments:   Reason for Stopping:         levOCARNitine (CARNITOR) 330 MG tablet Comments:   Reason for Stopping:              Current Discharge Medication List      CONTINUE these medications which have CHANGED    Details   aspirin 81 mg chewable tablet Chew 1 tablet (81 mg total) daily  Qty: 30 tablet, Refills: 0    Associated Diagnoses: Hyperlipidemia      atorvastatin (LIPITOR) 40 mg tablet Take 1 tablet (40 mg total) by mouth daily with dinner  Qty: 30 tablet, Refills: 0    Associated Diagnoses: Hyperlipidemia, unspecified hyperlipidemia type      cholecalciferol (VITAMIN D3) 1,000 units tablet Take 1 tablet (1,000 Units total) by mouth daily  Qty: 30 tablet, Refills: 0    Associated Diagnoses: Vitamin D deficiency      lisinopril (ZESTRIL) 20 mg tablet Take 1 tablet (20 mg total) by mouth daily  Qty: 30 tablet, Refills: 0    Associated Diagnoses: Essential hypertension      melatonin 3 mg Take 1 tablet (3 mg total) by mouth daily at bedtime  Qty: 30 tablet, Refills: 0    Associated Diagnoses: Bipolar affective disorder, depressed, severe (HCC)            Current Discharge Medication List      CONTINUE these medications which have NOT CHANGED    Details   divalproex sodium (DEPAKOTE ER) 500 mg 24 hr tablet Take 4 tablets (2,000 mg total) by mouth daily at bedtime  Qty: 120 tablet, Refills: 1    Associated Diagnoses: Bipolar affective disorder, depressed, severe, with psychotic behavior (HCC)      FLUoxetine (PROzac) 40 MG capsule Take 2 capsules (80 mg total) by mouth daily  Qty: 60 capsule, Refills: 1    Associated Diagnoses: Bipolar affective disorder, depressed, severe, with psychotic behavior (HCC)      ziprasidone (GEODON) 40 mg capsule Take 1 capsule (40 mg total) by mouth 2 (two) times a day with meals  Qty: 60 capsule, Refills: 1    Associated Diagnoses: Bipolar affective disorder, depressed, severe, with psychotic behavior ([facility] )              Discharge instructions/Information to patient and family:   See after visit summary for information provided to patient and family  Provisions for Follow-Up Care:  See after visit summary for information related to follow-up care and any pertinent home health orders  Discharge Statement   I spent 30 minutes discharging the patient  This time was spent on the day of discharge  I had direct contact with the patient on the day of discharge  Additional documentation is required if more than 30 minutes were spent on discharge

## 2022-04-19 NOTE — PROGRESS NOTES
Pt discharged with list of belongings:    Purple shirt  Blue sweat pants with stripes  Elma Right top  [employer] search book  [place] Right sneakers  [employer] dress with zipper  Black pants with zipper  Flower dress with metal  Pen    Pt signed and agreed to belongings on list

## 2022-04-19 NOTE — PROGRESS NOTES
04/19/22 0720   Activity/Group Checklist   Group Community meeting   Attendance Attended   Attendance Duration (min) 46-60   Interactions Interacted appropriately   Affect/Mood Appropriate   Goals Achieved Identified feelings; Discussed discharge plans; Able to listen to others; Able to engage in interactions; Able to self-disclose; Able to recieve feedback

## 2022-04-19 NOTE — PROGRESS NOTES
Progress Note - Benny Varghese 47 y o  female MRN: [id number]    Unit/Bed#Mateusz Lopez 209-02 Encounter: [id number]        Subjective:   Patient seen and examined at bedside after reviewing the chart and discussing the case with the caring staff  Patient examined at bedside  Patient has no acute complaints  Patient is being discharged today  Patient is requesting all her prescriptions  I reviewed and reconciled patient's problem list and medications  Physical Exam   Vitals: Blood pressure 129/61, pulse 80, temperature 98 °F (36 7 °C), temperature source Temporal, resp  rate 22, height 5' 6" (1 676 m), weight 118 kg (260 lb 5 8 oz), SpO2 97 %  ,Body mass index is 42 02 kg/m²  Constitutional: Patient appears well-developed  HEENT: PERR, EOMI, MMM  Cardiovascular: Normal rate and regular rhythm  Pulmonary/Chest: Effort normal and breath sounds normal    Abdomen: Soft, + BS, NT, nondistended  Assessment/Plan:  Benny Varghese is a(n) 47y o  year old female with bipolar disorder with depression      MEDICAL CLEARANCE  Patient is medically cleared for discharge  All scripts will be sent out for the patient  1  Cardiac with history of hypertension and dyslipidemia  Patient is on lisinopril 20 mg daily, atorvastatin 40 mg daily and aspirin 81 mg daily  2  Carnitine deficiency  Patient is on levocarnitine 330 mg daily  3  Migraine headaches  Patient may get Tylenol/ibuprofen on as-needed basis  4  Seizure disorder  Patient is on Depakote ER 2000 mg at bedtime  5  Constipation  Patient is on MiraLax 17 g daily, Senokot S twice daily, milk of magnesia as needed  6  DJD/osteoarthritis with lower back pain  Patient may continue to get Tylenol and ibuprofen p r n   7  Intertrigo involving the skin folds and groin  Patient has been put on nystatin powder 3 times daily along with dry gauze to keep the area dry  8  Vitamin-D deficiency  Patient is on vitamin D3 1000 units daily

## 2022-04-25 ENCOUNTER — HOSPITAL ENCOUNTER (EMERGENCY)
Facility: HOSPITAL | Age: 52
End: 2022-04-26
Attending: EMERGENCY MEDICINE | Admitting: EMERGENCY MEDICINE
Payer: MEDICARE

## 2022-04-25 DIAGNOSIS — R45.851 SUICIDAL IDEATION: ICD-10-CM

## 2022-04-25 DIAGNOSIS — F25.9 SCHIZOAFFECTIVE DISORDER (HCC): ICD-10-CM

## 2022-04-25 DIAGNOSIS — F39 MOOD DISORDER (HCC): Primary | ICD-10-CM

## 2022-04-25 LAB
AMPHETAMINES SERPL QL SCN: NEGATIVE
ANION GAP SERPL CALCULATED.3IONS-SCNC: 7 MMOL/L (ref 4–13)
ATRIAL RATE: 81 BPM
BACTERIA UR QL AUTO: ABNORMAL /HPF
BARBITURATES UR QL: NEGATIVE
BASOPHILS # BLD AUTO: 0.04 THOUSANDS/ΜL (ref 0–0.1)
BASOPHILS NFR BLD AUTO: 0 % (ref 0–1)
BENZODIAZ UR QL: NEGATIVE
BILIRUB UR QL STRIP: NEGATIVE
BUN SERPL-MCNC: 19 MG/DL (ref 5–25)
CALCIUM SERPL-MCNC: 9.5 MG/DL (ref 8.3–10.1)
CHLORIDE SERPL-SCNC: 95 MMOL/L (ref 100–108)
CLARITY UR: CLEAR
CO2 SERPL-SCNC: 27 MMOL/L (ref 21–32)
COCAINE UR QL: NEGATIVE
COLOR UR: ABNORMAL
CREAT SERPL-MCNC: 0.63 MG/DL (ref 0.6–1.3)
EOSINOPHIL # BLD AUTO: 0.06 THOUSAND/ΜL (ref 0–0.61)
EOSINOPHIL NFR BLD AUTO: 1 % (ref 0–6)
ERYTHROCYTE [DISTWIDTH] IN BLOOD BY AUTOMATED COUNT: 14 % (ref 11.6–15.1)
ETHANOL EXG-MCNC: 0 MG/DL
FLUAV RNA RESP QL NAA+PROBE: NEGATIVE
FLUBV RNA RESP QL NAA+PROBE: NEGATIVE
GFR SERPL CREATININE-BSD FRML MDRD: 104 ML/MIN/1.73SQ M
GLUCOSE SERPL-MCNC: 92 MG/DL (ref 65–140)
GLUCOSE UR STRIP-MCNC: NEGATIVE MG/DL
HCT VFR BLD AUTO: 39.8 % (ref 34.8–46.1)
HGB BLD-MCNC: 13.4 G/DL (ref 11.5–15.4)
HGB UR QL STRIP.AUTO: NEGATIVE
IMM GRANULOCYTES # BLD AUTO: 0.1 THOUSAND/UL (ref 0–0.2)
IMM GRANULOCYTES NFR BLD AUTO: 1 % (ref 0–2)
KETONES UR STRIP-MCNC: ABNORMAL MG/DL
LEUKOCYTE ESTERASE UR QL STRIP: ABNORMAL
LYMPHOCYTES # BLD AUTO: 2.13 THOUSANDS/ΜL (ref 0.6–4.47)
LYMPHOCYTES NFR BLD AUTO: 21 % (ref 14–44)
MCH RBC QN AUTO: 32.4 PG (ref 26.8–34.3)
MCHC RBC AUTO-ENTMCNC: 33.7 G/DL (ref 31.4–37.4)
MCV RBC AUTO: 96 FL (ref 82–98)
METHADONE UR QL: NEGATIVE
MONOCYTES # BLD AUTO: 0.89 THOUSAND/ΜL (ref 0.17–1.22)
MONOCYTES NFR BLD AUTO: 9 % (ref 4–12)
NEUTROPHILS # BLD AUTO: 7.02 THOUSANDS/ΜL (ref 1.85–7.62)
NEUTS SEG NFR BLD AUTO: 68 % (ref 43–75)
NITRITE UR QL STRIP: NEGATIVE
NON-SQ EPI CELLS URNS QL MICRO: ABNORMAL /HPF
NRBC BLD AUTO-RTO: 0 /100 WBCS
OPIATES UR QL SCN: NEGATIVE
OXYCODONE+OXYMORPHONE UR QL SCN: NEGATIVE
P AXIS: 57 DEGREES
PCP UR QL: NEGATIVE
PH UR STRIP.AUTO: 5.5 [PH] (ref 4.5–8)
PLATELET # BLD AUTO: 225 THOUSANDS/UL (ref 149–390)
PMV BLD AUTO: 9.9 FL (ref 8.9–12.7)
POTASSIUM SERPL-SCNC: 4.4 MMOL/L (ref 3.5–5.3)
PR INTERVAL: 190 MS
PROT UR STRIP-MCNC: NEGATIVE MG/DL
QRS AXIS: 60 DEGREES
QRSD INTERVAL: 82 MS
QT INTERVAL: 348 MS
QTC INTERVAL: 401 MS
RBC # BLD AUTO: 4.14 MILLION/UL (ref 3.81–5.12)
RBC #/AREA URNS AUTO: ABNORMAL /HPF
RSV RNA RESP QL NAA+PROBE: NEGATIVE
SARS-COV-2 RNA RESP QL NAA+PROBE: NEGATIVE
SODIUM SERPL-SCNC: 129 MMOL/L (ref 136–145)
SP GR UR STRIP.AUTO: 1.02 (ref 1–1.03)
T WAVE AXIS: 62 DEGREES
THC UR QL: NEGATIVE
TSH SERPL DL<=0.05 MIU/L-ACNC: 2.68 UIU/ML (ref 0.45–4.5)
UROBILINOGEN UR QL STRIP.AUTO: 1 E.U./DL
VALPROATE SERPL-MCNC: 76 UG/ML (ref 50–100)
VENTRICULAR RATE: 80 BPM
WBC # BLD AUTO: 10.24 THOUSAND/UL (ref 4.31–10.16)
WBC #/AREA URNS AUTO: ABNORMAL /HPF

## 2022-04-25 PROCEDURE — 99285 EMERGENCY DEPT VISIT HI MDM: CPT

## 2022-04-25 PROCEDURE — 96360 HYDRATION IV INFUSION INIT: CPT

## 2022-04-25 PROCEDURE — 99285 EMERGENCY DEPT VISIT HI MDM: CPT | Performed by: EMERGENCY MEDICINE

## 2022-04-25 PROCEDURE — 36415 COLL VENOUS BLD VENIPUNCTURE: CPT

## 2022-04-25 PROCEDURE — 93005 ELECTROCARDIOGRAM TRACING: CPT

## 2022-04-25 PROCEDURE — 93010 ELECTROCARDIOGRAM REPORT: CPT | Performed by: INTERNAL MEDICINE

## 2022-04-25 PROCEDURE — 0241U HB NFCT DS VIR RESP RNA 4 TRGT: CPT

## 2022-04-25 PROCEDURE — 80048 BASIC METABOLIC PNL TOTAL CA: CPT

## 2022-04-25 PROCEDURE — 84443 ASSAY THYROID STIM HORMONE: CPT

## 2022-04-25 PROCEDURE — 80307 DRUG TEST PRSMV CHEM ANLYZR: CPT

## 2022-04-25 PROCEDURE — 80164 ASSAY DIPROPYLACETIC ACD TOT: CPT

## 2022-04-25 PROCEDURE — 81001 URINALYSIS AUTO W/SCOPE: CPT

## 2022-04-25 PROCEDURE — 82075 ASSAY OF BREATH ETHANOL: CPT

## 2022-04-25 PROCEDURE — 96361 HYDRATE IV INFUSION ADD-ON: CPT

## 2022-04-25 PROCEDURE — 85025 COMPLETE CBC W/AUTO DIFF WBC: CPT

## 2022-04-25 RX ADMIN — SODIUM CHLORIDE 1000 ML: 0.9 INJECTION, SOLUTION INTRAVENOUS at 17:46

## 2022-04-25 NOTE — ED ATTENDING ATTESTATION
4/25/2022  Dimitri Ozuna DO, saw and evaluated the patient  I have discussed the patient with the resident/non-physician practitioner and agree with the resident's/non-physician practitioner's findings, Plan of Care, and MDM as documented in the resident's/non-physician practitioner's note, except where noted  All available labs and Radiology studies were reviewed  I was present for key portions of any procedure(s) performed by the resident/non-physician practitioner and I was immediately available to provide assistance  At this point I agree with the current assessment done in the Emergency Department  I have conducted an independent evaluation of this patient a history and physical is as follows:    Patient presents via EMS for evaluation of depression with suicidal ideation with a plan to step in front of moving traffic  She has previously attempted overdose with Benadryl  She was admitted recently to [facility] and was discharged couple of days ago  When she begins to feel suicidal, she uses some tacks to excoriate her forearms  She has done this recently without need for surgical closure  She reports hearing distressing, non-command auditory hallucinations telling her that her boyfriend does not love her anymore  She denies HI     ROS: Denies f/c, HA, CP, SOB, abdominal pain, n/v/d  12 system ROS o/w negative  PE: NAD, appears comfortable, alert, cooperative; PERRL, EOMI; MMM, no posterior oropharyngeal exudate, edema or erythema; HRR, no murmur; lungs CTA w/o w/r/r, POx 97% on RA (nl); abdomen s/nt/nd, nl BS in all 4 quadrant; (-) LE edema or calf TTP, FROM extremities x4; skin p/w/d; CN II-XII GI/NF, oriented; Psych flat affect, good eye contact, poor insight  DDx: Depression, SI w/credible plan  A/P: Will check BAT, UDS, urine preg, consult crisis for 201 admission             ED Course  ED Course as of 04/25/22 1725 Mon Apr 25, 2022 1724 Sodium(!): 129  Baseline normal but most recently 132  Will give NSS  Critical Care Time  ECG 12 Lead Documentation Only    Date/Time: 4/25/2022 5:24 PM  Performed by: Karyna Snyder DO  Authorized by:  Karyna Snyder DO     Indications / Diagnosis:  Medical clearance  ECG reviewed by me, the ED Provider: yes    Patient location:  ED  Interpretation:     Interpretation: normal    Rate:     ECG rate:  88    ECG rate assessment: normal    Rhythm:     Rhythm: sinus rhythm    Ectopy:     Ectopy: none    Conduction:     Conduction: normal    ST segments:     ST segments:  Normal  T waves:     T waves: normal

## 2022-04-25 NOTE — ED NOTES
Pt comes to the ed via ems after telling her PCP that she is suicidal  Pt has a long psych hx and is diagnosed with MDD  Pt sees Dr Nettie Dolan outpatient and is med compliant  Pt lives at [facility] which is assisted living  Pt denies having an ICM or Act Team  Pt claims to be med compliant  She admits to thoughts of suicide with a plan to walk into traffic  Pt has past suicide attempts by OD and walking into traffic  She denies homicidal ideations but admits to auditory hallucinations  The voices say her boyfriend doesn't love her and she is fat and ugly  Pt has been inpatient multiple times with the last being at [facility] recently  Pt said the suicidal ideations began about two days ago  Sleep is reported as poor and appetite is fair  Pt denies any D&A use or legal issues  Pt said her main support is her boyfriend  She also reports PTSD  Pt is requesting inpatient psych and will sign a 201  She is currently calm and cooperative

## 2022-04-25 NOTE — ED NOTES
There are currently no beds available at:    [facility] per Zulema Wright per [facility] AND [facility] per Suzan Donovan  First  Lugene Sole answer  [facility] per Sanjuanita Kim per Christy Tracy 371 answer  [facility] per Leda    Faxed clinical to [address] [place] [address] for review today  Faxed clinical to [facility] for review of a potential bed tomorrow

## 2022-04-25 NOTE — ED PROVIDER NOTES
History  Chief Complaint   Patient presents with    Suicidal     pt reports being at pcp today and reporting thoughts of being suicidial  Pt reports stabbing herself with a thumb tack in the L arm earlier today      70-year-old female patient with history of bipolar 1, schizoaffective, seizures, HLD presenting with suicidal ideation onset 2 days ago  Patient states that she was just discharged from inpatient Psychiatry 6 days ago for SI  Patient states that she started feeling suicidal 2 days ago  States she has plan to walk in front car  States she has been hurting herself and been using a thumbtack to cut herself  Patient states that she is feeling suicidal because she has hearing voices that are telling her that her boyfriend does not love her, and she is getting into arguments with her roommate  The patient states that she has tried to commit suicide in the past by overdosing on Benadryl in April  Denies HI, physical complaints  Prior to Admission Medications   Prescriptions Last Dose Informant Patient Reported? Taking?    FLUoxetine (PROzac) 40 MG capsule   No No   Sig: Take 2 capsules (80 mg total) by mouth daily   FLUoxetine (PROzac) 40 MG capsule   No No   Sig: Take 2 capsules (80 mg total) by mouth daily   aspirin 81 mg chewable tablet   No No   Sig: Chew 1 tablet (81 mg total) daily   atorvastatin (LIPITOR) 40 mg tablet   No No   Sig: Take 1 tablet (40 mg total) by mouth daily with dinner   cholecalciferol (VITAMIN D3) 1,000 units tablet   No No   Sig: Take 1 tablet (1,000 Units total) by mouth daily   divalproex sodium (DEPAKOTE ER) 500 mg 24 hr tablet   No No   Sig: Take 4 tablets (2,000 mg total) by mouth daily at bedtime   divalproex sodium (DEPAKOTE ER) 500 mg 24 hr tablet   No No   Sig: Take 4 tablets (2,000 mg total) by mouth daily at bedtime   ibuprofen (MOTRIN) 600 mg tablet   No No   Sig: Take 1 tablet (600 mg total) by mouth every 6 (six) hours as needed for moderate pain levOCARNitine (CARNITOR) 1 g/10 mL solution   No No   Sig: Take 3 3 mL (330 mg total) by mouth in the morning   lisinopril (ZESTRIL) 20 mg tablet   No No   Sig: Take 1 tablet (20 mg total) by mouth daily   melatonin 3 mg   No No   Sig: Take 1 tablet (3 mg total) by mouth daily at bedtime   nystatin (MYCOSTATIN) powder   No No   Sig: Apply topically 3 (three) times a day   ziprasidone (GEODON) 40 mg capsule   No No   Sig: Take 1 capsule (40 mg total) by mouth 2 (two) times a day with meals   ziprasidone (GEODON) 40 mg capsule   No No   Sig: Take 1 capsule (40 mg total) by mouth 2 (two) times a day with meals      Facility-Administered Medications: None       Past Medical History:   Diagnosis Date    Anxiety     Bipolar 1 disorder (HCC)     Bipolar affective disorder, depressed, severe ([facility] ) 10/10/2021    [facility]     CAD (coronary artery disease)     Cognitive impairment     Depression     Dyslipidemia     GERD (gastroesophageal reflux disease)     Hypertension     Obesity     Psychiatric disorder     PTSD (post-traumatic stress disorder)     Schizoaffective disorder (HCC)     Seizure ([facility] )        Past Surgical History:   Procedure Laterality Date    APPENDECTOMY      PATIENT DENIES HAVING APPENDIX REMOVED    CHOLECYSTECTOMY      FOOT SURGERY Right        Family History   Problem Relation Age of Onset    Kidney cancer Mother     Cerebral aneurysm Father      I have reviewed and agree with the history as documented      E-Cigarette/Vaping    E-Cigarette Use Never User      E-Cigarette/Vaping Substances    Nicotine No     THC No     CBD No     Flavoring No     Other No     Unknown No      Social History     Tobacco Use    Smoking status: Never Smoker    Smokeless tobacco: Never Used    Tobacco comment: Pt stated "smokes when stressed"   Vaping Use    Vaping Use: Never used   Substance Use Topics    Alcohol use: Not Currently    Drug use: Not Currently Review of Systems   Psychiatric/Behavioral: Positive for hallucinations, self-injury and suicidal ideas  All other systems reviewed and are negative  Physical Exam  ED Triage Vitals   Temperature Pulse Respirations Blood Pressure SpO2   04/25/22 1439 04/25/22 1439 04/25/22 1439 04/25/22 1439 04/25/22 1439   98 4 °F (36 9 °C) 87 16 117/53 97 %      Temp Source Heart Rate Source Patient Position - Orthostatic VS BP Location FiO2 (%)   04/25/22 1439 04/25/22 1439 04/25/22 1439 04/25/22 1439 --   Oral Monitor Lying Left arm       Pain Score       04/26/22 0631       10 - Worst Possible Pain             Orthostatic Vital Signs  Vitals:    04/26/22 0000 04/26/22 0441 04/26/22 0631 04/26/22 0832   BP: 108/62 116/83 132/66 118/56   Pulse: 80 80 74 80   Patient Position - Orthostatic VS:    Lying       Physical Exam  Vitals reviewed  Constitutional:       Appearance: Normal appearance  She is obese  HENT:      Head: Normocephalic and atraumatic  Nose: Nose normal       Mouth/Throat:      Mouth: Mucous membranes are moist       Pharynx: Oropharynx is clear  Eyes:      Extraocular Movements: Extraocular movements intact  Conjunctiva/sclera: Conjunctivae normal    Cardiovascular:      Rate and Rhythm: Normal rate and regular rhythm  Pulses: Normal pulses  Heart sounds: Normal heart sounds  Pulmonary:      Effort: Pulmonary effort is normal       Breath sounds: Normal breath sounds  Abdominal:      General: Bowel sounds are normal       Palpations: Abdomen is soft  Tenderness: There is no abdominal tenderness  Musculoskeletal:         General: Normal range of motion  Cervical back: Normal range of motion  Skin:     General: Skin is warm and dry  Comments: Right arm abrasion with dry blood   Neurological:      General: No focal deficit present  Mental Status: She is alert and oriented to person, place, and time  Mental status is at baseline     Psychiatric: Attention and Perception: She perceives auditory hallucinations  Mood and Affect: Mood normal          Speech: Speech normal          Behavior: Behavior normal          Thought Content: Thought content includes suicidal ideation  Thought content does not include homicidal ideation           ED Medications  Medications   sodium chloride 0 9 % bolus 1,000 mL (0 mL Intravenous Stopped 4/26/22 0222)       Diagnostic Studies  Results Reviewed     Procedure Component Value Units Date/Time    Comprehensive metabolic panel [[id number]]  (Abnormal) Collected: 04/26/22 1042    Lab Status: Final result Specimen: Blood from Arm, Left Updated: 04/26/22 1117     Sodium 130 mmol/L      Potassium 5 0 mmol/L      Chloride 98 mmol/L      CO2 29 mmol/L      ANION GAP 3 mmol/L      BUN 14 mg/dL      Creatinine 0 56 mg/dL      Glucose 94 mg/dL      Calcium 9 4 mg/dL      Corrected Calcium 10 2 mg/dL      AST 29 U/L      ALT 17 U/L      Alkaline Phosphatase 64 U/L      Total Protein 6 9 g/dL      Albumin 3 0 g/dL      Total Bilirubin 0 72 mg/dL      eGFR 108 ml/min/1 73sq m     Narrative:      [place] guidelines for Chronic Kidney Disease (CKD):     Stage 1 with normal or high GFR (GFR > 90 mL/min/1 73 square meters)    Stage 2 Mild CKD (GFR = 60-89 mL/min/1 73 square meters)    Stage 3A Moderate CKD (GFR = 45-59 mL/min/1 73 square meters)    Stage 3B Moderate CKD (GFR = 30-44 mL/min/1 73 square meters)    Stage 4 Severe CKD (GFR = 15-29 mL/min/1 73 square meters)    Stage 5 End Stage CKD (GFR <15 mL/min/1 73 square meters)  Note: GFR calculation is accurate only with a steady state creatinine    Rapid drug screen, urine [[id number]]  (Normal) Collected: 04/25/22 1737    Lab Status: Final result Specimen: Urine, Clean Catch Updated: 04/25/22 1823     Amph/Meth UR Negative     Barbiturate Ur Negative     Benzodiazepine Urine Negative     Cocaine Urine Negative     Methadone Urine Negative     Opiate Urine Negative     PCP Ur Negative     THC Urine Negative     Oxycodone Urine Negative    Narrative:      FOR MEDICAL PURPOSES ONLY  IF CONFIRMATION NEEDED PLEASE CONTACT THE LAB WITHIN 5 DAYS      Drug Screen Cutoff Levels:  AMPHETAMINE/METHAMPHETAMINES  1000 ng/mL  BARBITURATES     200 ng/mL  BENZODIAZEPINES     200 ng/mL  COCAINE      300 ng/mL  METHADONE      300 ng/mL  OPIATES      300 ng/mL  PHENCYCLIDINE     25 ng/mL  THC       50 ng/mL  OXYCODONE      100 ng/mL    Urine Microscopic [[id number]]  (Abnormal) Collected: 04/25/22 1734    Lab Status: Final result Specimen: Urine, Clean Catch Updated: 04/25/22 1807     RBC, UA None Seen /hpf      WBC, UA 4-10 /hpf      Epithelial Cells Occasional /hpf      Bacteria, UA None Seen /hpf     CBC and differential [[id number]]  (Abnormal) Collected: 04/25/22 1747    Lab Status: Final result Specimen: Blood from Arm, Left Updated: 04/25/22 1758     WBC 10 24 Thousand/uL      RBC 4 14 Million/uL      Hemoglobin 13 4 g/dL      Hematocrit 39 8 %      MCV 96 fL      MCH 32 4 pg      MCHC 33 7 g/dL      RDW 14 0 %      MPV 9 9 fL      Platelets 293 Thousands/uL      nRBC 0 /100 WBCs      Neutrophils Relative 68 %      Immat GRANS % 1 %      Lymphocytes Relative 21 %      Monocytes Relative 9 %      Eosinophils Relative 1 %      Basophils Relative 0 %      Neutrophils Absolute 7 02 Thousands/µL      Immature Grans Absolute 0 10 Thousand/uL      Lymphocytes Absolute 2 13 Thousands/µL      Monocytes Absolute 0 89 Thousand/µL      Eosinophils Absolute 0 06 Thousand/µL      Basophils Absolute 0 04 Thousands/µL     COVID/FLU/RSV - 2 hour TAT [[id number]]  (Normal) Collected: 04/25/22 1647    Lab Status: Final result Specimen: Nares from Nose Updated: 04/25/22 1741     SARS-CoV-2 Negative     INFLUENZA A PCR Negative     INFLUENZA B PCR Negative     RSV PCR Negative    Narrative:      FOR PEDIATRIC PATIENTS - copy/paste COVID Guidelines URL to browser: [email or web address]/  [email or web address]    SARS-CoV-2 assay is a Nucleic Acid Amplification assay intended for the  qualitative detection of nucleic acid from SARS-CoV-2 in nasopharyngeal  swabs  Results are for the presumptive identification of SARS-CoV-2 RNA  Positive results are indicative of infection with SARS-CoV-2, the virus  causing COVID-19, but do not rule out bacterial infection or co-infection  with other viruses  Laboratories within the [place] and its  territories are required to report all positive results to the appropriate  public health authorities  Negative results do not preclude SARS-CoV-2  infection and should not be used as the sole basis for treatment or other  patient management decisions  Negative results must be combined with  clinical observations, patient history, and epidemiological information  This test has not been FDA cleared or approved  This test has been authorized by FDA under an Emergency Use Authorization  (EUA)  This test is only authorized for the duration of time the  declaration that circumstances exist justifying the authorization of the  emergency use of an in vitro diagnostic tests for detection of SARS-CoV-2  virus and/or diagnosis of COVID-19 infection under section 564(b)(1) of  the Act, 21 U  S C  851XSC-6(Q)(9), unless the authorization is terminated  or revoked sooner  The test has been validated but independent review by FDA  and CLIA is pending  Test performed using [employer] GeneXpert: This RT-PCR assay targets N2,  a region unique to SARS-CoV-2  A conserved region in the E-gene was chosen  for pan-Sarbecovirus detection which includes SARS-CoV-2      Urine Macroscopic, POC [[id number]]  (Abnormal) Collected: 04/25/22 1734    Lab Status: Final result Specimen: Urine Updated: 04/25/22 1736     Color, UA Abida     Clarity, UA Clear     pH, UA 5 5     Leukocytes, UA Small     Nitrite, UA Negative     Protein, UA Negative mg/dl      Glucose, UA Negative mg/dl      Ketones, UA Trace mg/dl      Urobilinogen, UA 1 0 E U /dl      Bilirubin, UA Negative     Blood, UA Negative     Specific Gravity, UA 1 025    Narrative:      CLINITEK RESULT    TSH, 3rd generation with Free T4 reflex [[id number]]  (Normal) Collected: 04/25/22 1646    Lab Status: Final result Specimen: Blood from Arm, Right Updated: 04/25/22 1718     TSH 3RD GENERATON 2 680 uIU/mL     Narrative:      Patients undergoing fluorescein dye angiography may retain small amounts of fluorescein in the body for 48-72 hours post procedure  Samples containing fluorescein can produce falsely depressed TSH values  If the patient had this procedure,a specimen should be resubmitted post fluorescein clearance        Valproic acid level, total [[id number]]  (Normal) Collected: 04/25/22 1646    Lab Status: Final result Specimen: Blood from Arm, Right Updated: 04/25/22 1718     Valproic Acid, Total 76 ug/mL     Basic metabolic panel [[id number]]  (Abnormal) Collected: 04/25/22 1646    Lab Status: Final result Specimen: Blood from Arm, Right Updated: 04/25/22 1718     Sodium 129 mmol/L      Potassium 4 4 mmol/L      Chloride 95 mmol/L      CO2 27 mmol/L      ANION GAP 7 mmol/L      BUN 19 mg/dL      Creatinine 0 63 mg/dL      Glucose 92 mg/dL      Calcium 9 5 mg/dL      eGFR 104 ml/min/1 73sq m     Narrative:      [place] guidelines for Chronic Kidney Disease (CKD):     Stage 1 with normal or high GFR (GFR > 90 mL/min/1 73 square meters)    Stage 2 Mild CKD (GFR = 60-89 mL/min/1 73 square meters)    Stage 3A Moderate CKD (GFR = 45-59 mL/min/1 73 square meters)    Stage 3B Moderate CKD (GFR = 30-44 mL/min/1 73 square meters)    Stage 4 Severe CKD (GFR = 15-29 mL/min/1 73 square meters)    Stage 5 End Stage CKD (GFR <15 mL/min/1 73 square meters)  Note: GFR calculation is accurate only with a steady state creatinine    POCT alcohol breath test [[id number]] (Normal) Resulted: 04/25/22 1716    Lab Status: Final result Updated: 04/25/22 1716     EXTBreath Alcohol 0 000                 No orders to display         Procedures  Procedures      ED Course  ED Course as of 04/26/22 2247   Mon Apr 25, 2022   1523 Medically cleared for inpatient psych                             SBIRT 20yo+      Most Recent Value   SBIRT (23 yo +)    In order to provide better care to our patients, we are screening all of our patients for alcohol and drug use  Would it be okay to ask you these screening questions? No Filed at: 04/25/2022 2048                MDM  Number of Diagnoses or Management Options  Schizoaffective disorder ([facility] )  Suicidal ideation  Diagnosis management comments: 76-year-old female patient with history of bipolar presenting with suicidal ideation  Patient also has auditory hallucination  Patient has been cutting herself with a thumb tack  On exam, the patient has suicidal ideation, abrasion to right arm  Patient signed 61 51 81, pending placement  Patient found to be hyponatremic started on fluids  Signed out  Amount and/or Complexity of Data Reviewed  Clinical lab tests: reviewed and ordered        Disposition  Final diagnoses:   Schizoaffective disorder ([facility] )   Suicidal ideation     Time reflects when diagnosis was documented in both MDM as applicable and the Disposition within this note     Time User Action Codes Description Comment    4/26/2022 12:14 PM Keyla Bar Add [F39] Mood disorder ([facility] )     4/26/2022  2:40 PM Merle Mote Add [F25 9] Schizoaffective disorder ([place] [address] )     4/26/2022  2:41 PM Merle Mote Add [S97 892] Suicidal ideation       ED Disposition     ED Disposition Condition Date/Time Comment    Transfer to [address] [place] [address] Apr 26, 2022  1:14 PM Leydi Sagastume Kojo should be transferred out  and has been medically cleared          MD Documentation      Most Recent Value   Patient Condition The patient has been stabilized such that within reasonable medical probability, no material deterioration of the patient condition or the condition of the unborn child(courtney) is likely to result from the transfer   Reason for Transfer Level of Care needed not available at this facility   Benefits of Transfer Specialized equipment and/or services available at the receiving facility (Include comment)________________________   Risks of Transfer Potential for delay in receiving treatment   Accepting Physician [address] Name, [address] [place] [address] Rd by [address] and Unit [address]) CTS   Sending MD [facility]   Provider Certification General risk, such as traffic hazards, adverse weather conditions, rough terrain or turbulence, possible failure of equipment (including vehicle or aircraft), or consequences of actions of persons outside the control of the transport personnel      RN Documentation      Most [address] Name, [address] [place] [address] Rd by [address] and Unit [address]) CTS   Level of Care Basic life support      Follow-up Information    None         Discharge Medication List as of 4/26/2022  6:36 PM      CONTINUE these medications which have NOT CHANGED    Details   aspirin 81 mg chewable tablet Chew 1 tablet (81 mg total) daily, Starting Sat 4/16/2022, Until Wed 6/15/2022, Normal      atorvastatin (LIPITOR) 40 mg tablet Take 1 tablet (40 mg total) by mouth daily with dinner, Starting Sat 4/16/2022, Until Mon 5/16/2022, Normal      cholecalciferol (VITAMIN D3) 1,000 units tablet Take 1 tablet (1,000 Units total) by mouth daily, Starting Sat 4/16/2022, Until Mon 5/16/2022, Normal      divalproex sodium (DEPAKOTE ER) 500 mg 24 hr tablet Take 4 tablets (2,000 mg total) by mouth daily at bedtime, Starting Tue 4/19/2022, Until Sat 6/18/2022, Normal      FLUoxetine (PROzac) 40 MG capsule Take 2 capsules (80 mg total) by mouth daily, Starting Tue 4/19/2022, Until Sat 6/18/2022, Normal      ibuprofen (MOTRIN) 600 mg tablet Take 1 tablet (600 mg total) by mouth every 6 (six) hours as needed for moderate pain, Starting Sat 4/16/2022, Normal      levOCARNitine (CARNITOR) 1 g/10 mL solution Take 3 3 mL (330 mg total) by mouth in the morning, Starting Sat 4/16/2022, Until Mon 5/16/2022, Normal      lisinopril (ZESTRIL) 20 mg tablet Take 1 tablet (20 mg total) by mouth daily, Starting Sat 4/16/2022, Until Mon 5/16/2022, Normal      melatonin 3 mg Take 1 tablet (3 mg total) by mouth daily at bedtime, Starting Tue 4/19/2022, Until Sat 6/18/2022, Normal      nystatin (MYCOSTATIN) powder Apply topically 3 (three) times a day, Starting Sat 4/16/2022, Normal      ziprasidone (GEODON) 40 mg capsule Take 1 capsule (40 mg total) by mouth 2 (two) times a day with meals, Starting Tue 4/19/2022, Until Sat 6/18/2022, Normal           No discharge procedures on file  PDMP Review     None           ED Provider  Attending physically available and evaluated Sue Francisco  BANG managed the patient along with the ED Attending      Electronically Signed by         Emerald Mccarthy MD  04/26/22 1375

## 2022-04-26 ENCOUNTER — HOSPITAL ENCOUNTER (INPATIENT)
Facility: HOSPITAL | Age: 52
LOS: 13 days | Discharge: HOME | DRG: 885 | End: 2022-05-09
Attending: STUDENT IN AN ORGANIZED HEALTH CARE EDUCATION/TRAINING PROGRAM | Admitting: PSYCHIATRY & NEUROLOGY
Payer: MEDICARE

## 2022-04-26 VITALS
RESPIRATION RATE: 16 BRPM | DIASTOLIC BLOOD PRESSURE: 56 MMHG | HEART RATE: 80 BPM | TEMPERATURE: 98.4 F | BODY MASS INDEX: 41.97 KG/M2 | OXYGEN SATURATION: 96 % | SYSTOLIC BLOOD PRESSURE: 118 MMHG | WEIGHT: 260 LBS

## 2022-04-26 DIAGNOSIS — F39 MOOD DISORDER (HCC): ICD-10-CM

## 2022-04-26 DIAGNOSIS — E78.5 HYPERLIPIDEMIA, UNSPECIFIED HYPERLIPIDEMIA TYPE: ICD-10-CM

## 2022-04-26 DIAGNOSIS — I10 ESSENTIAL HYPERTENSION: ICD-10-CM

## 2022-04-26 DIAGNOSIS — M19.90 DJD (DEGENERATIVE JOINT DISEASE): ICD-10-CM

## 2022-04-26 DIAGNOSIS — K59.00 CONSTIPATION: ICD-10-CM

## 2022-04-26 DIAGNOSIS — E71.40 CARNITINE DEFICIENCY (HCC): ICD-10-CM

## 2022-04-26 DIAGNOSIS — G47.00 INSOMNIA: ICD-10-CM

## 2022-04-26 DIAGNOSIS — E78.5 HYPERLIPIDEMIA: ICD-10-CM

## 2022-04-26 DIAGNOSIS — F32.3 MAJOR DEPRESSION WITH PSYCHOTIC FEATURES (HCC): Primary | ICD-10-CM

## 2022-04-26 DIAGNOSIS — E55.9 VITAMIN D DEFICIENCY: ICD-10-CM

## 2022-04-26 LAB
ALBUMIN SERPL BCP-MCNC: 3 G/DL (ref 3.5–5)
ALP SERPL-CCNC: 64 U/L (ref 46–116)
ALT SERPL W P-5'-P-CCNC: 17 U/L (ref 12–78)
ANION GAP SERPL CALCULATED.3IONS-SCNC: 3 MMOL/L (ref 4–13)
AST SERPL W P-5'-P-CCNC: 29 U/L (ref 5–45)
BILIRUB SERPL-MCNC: 0.72 MG/DL (ref 0.2–1)
BUN SERPL-MCNC: 14 MG/DL (ref 5–25)
CALCIUM ALBUM COR SERPL-MCNC: 10.2 MG/DL (ref 8.3–10.1)
CALCIUM SERPL-MCNC: 9.4 MG/DL (ref 8.3–10.1)
CHLORIDE SERPL-SCNC: 98 MMOL/L (ref 100–108)
CO2 SERPL-SCNC: 29 MMOL/L (ref 21–32)
CREAT SERPL-MCNC: 0.56 MG/DL (ref 0.6–1.3)
GFR SERPL CREATININE-BSD FRML MDRD: 108 ML/MIN/1.73SQ M
GLUCOSE SERPL-MCNC: 94 MG/DL (ref 65–140)
POTASSIUM SERPL-SCNC: 5 MMOL/L (ref 3.5–5.3)
PROT SERPL-MCNC: 6.9 G/DL (ref 6.4–8.2)
SODIUM SERPL-SCNC: 130 MMOL/L (ref 136–145)

## 2022-04-26 PROCEDURE — 96361 HYDRATE IV INFUSION ADD-ON: CPT

## 2022-04-26 PROCEDURE — 80053 COMPREHEN METABOLIC PANEL: CPT

## 2022-04-26 PROCEDURE — 36415 COLL VENOUS BLD VENIPUNCTURE: CPT

## 2022-04-26 RX ORDER — ATORVASTATIN CALCIUM 40 MG/1
40 TABLET, FILM COATED ORAL
Status: DISCONTINUED | OUTPATIENT
Start: 2022-04-26 | End: 2022-05-09 | Stop reason: HOSPADM

## 2022-04-26 RX ORDER — MINERAL OIL AND PETROLATUM 150; 830 MG/G; MG/G
1 OINTMENT OPHTHALMIC
Status: DISCONTINUED | OUTPATIENT
Start: 2022-04-26 | End: 2022-05-09 | Stop reason: HOSPADM

## 2022-04-26 RX ORDER — POLYETHYLENE GLYCOL 3350 17 G/17G
17 POWDER, FOR SOLUTION ORAL DAILY PRN
Status: DISCONTINUED | OUTPATIENT
Start: 2022-04-26 | End: 2022-05-09 | Stop reason: HOSPADM

## 2022-04-26 RX ORDER — LEVOCARNITINE 1 G/10ML
330 SOLUTION ORAL EVERY MORNING
Status: DISCONTINUED | OUTPATIENT
Start: 2022-04-27 | End: 2022-05-09 | Stop reason: HOSPADM

## 2022-04-26 RX ORDER — OLANZAPINE 10 MG/1
10 INJECTION, POWDER, LYOPHILIZED, FOR SOLUTION INTRAMUSCULAR
Status: DISCONTINUED | OUTPATIENT
Start: 2022-04-26 | End: 2022-05-09 | Stop reason: HOSPADM

## 2022-04-26 RX ORDER — OLANZAPINE 10 MG/1
10 TABLET ORAL
Status: CANCELLED | OUTPATIENT
Start: 2022-04-26

## 2022-04-26 RX ORDER — MAGNESIUM HYDROXIDE/ALUMINUM HYDROXICE/SIMETHICONE 120; 1200; 1200 MG/30ML; MG/30ML; MG/30ML
30 SUSPENSION ORAL EVERY 4 HOURS PRN
Status: CANCELLED | OUTPATIENT
Start: 2022-04-26

## 2022-04-26 RX ORDER — ACETAMINOPHEN 325 MG/1
650 TABLET ORAL EVERY 6 HOURS PRN
Status: DISCONTINUED | OUTPATIENT
Start: 2022-04-26 | End: 2022-05-09 | Stop reason: HOSPADM

## 2022-04-26 RX ORDER — BENZTROPINE MESYLATE 1 MG/1
1 TABLET ORAL 2 TIMES DAILY PRN
Status: DISCONTINUED | OUTPATIENT
Start: 2022-04-26 | End: 2022-05-09 | Stop reason: HOSPADM

## 2022-04-26 RX ORDER — MAGNESIUM HYDROXIDE/ALUMINUM HYDROXICE/SIMETHICONE 120; 1200; 1200 MG/30ML; MG/30ML; MG/30ML
30 SUSPENSION ORAL EVERY 4 HOURS PRN
Status: DISCONTINUED | OUTPATIENT
Start: 2022-04-26 | End: 2022-05-09 | Stop reason: HOSPADM

## 2022-04-26 RX ORDER — ACETAMINOPHEN 325 MG/1
650 TABLET ORAL EVERY 6 HOURS PRN
Status: CANCELLED | OUTPATIENT
Start: 2022-04-26

## 2022-04-26 RX ORDER — ZIPRASIDONE MESYLATE 20 MG/ML
20 INJECTION, POWDER, LYOPHILIZED, FOR SOLUTION INTRAMUSCULAR ONCE
Status: DISCONTINUED | OUTPATIENT
Start: 2022-04-26 | End: 2022-04-26

## 2022-04-26 RX ORDER — ASPIRIN 81 MG/1
81 TABLET, CHEWABLE ORAL DAILY
Status: CANCELLED | OUTPATIENT
Start: 2022-04-27

## 2022-04-26 RX ORDER — OLANZAPINE 5 MG/1
5 TABLET ORAL
Status: CANCELLED | OUTPATIENT
Start: 2022-04-26

## 2022-04-26 RX ORDER — OLANZAPINE 10 MG/1
5 INJECTION, POWDER, LYOPHILIZED, FOR SOLUTION INTRAMUSCULAR
Status: CANCELLED | OUTPATIENT
Start: 2022-04-26

## 2022-04-26 RX ORDER — DIPHENHYDRAMINE HYDROCHLORIDE 50 MG/ML
50 INJECTION INTRAMUSCULAR; INTRAVENOUS EVERY 6 HOURS PRN
Status: CANCELLED | OUTPATIENT
Start: 2022-04-26

## 2022-04-26 RX ORDER — LANOLIN ALCOHOL/MO/W.PET/CERES
3 CREAM (GRAM) TOPICAL
Status: DISCONTINUED | OUTPATIENT
Start: 2022-04-26 | End: 2022-05-09 | Stop reason: HOSPADM

## 2022-04-26 RX ORDER — LANOLIN ALCOHOL/MO/W.PET/CERES
3 CREAM (GRAM) TOPICAL
Status: CANCELLED | OUTPATIENT
Start: 2022-04-26

## 2022-04-26 RX ORDER — HYDROXYZINE 50 MG/1
50 TABLET, FILM COATED ORAL
Status: DISCONTINUED | OUTPATIENT
Start: 2022-04-26 | End: 2022-05-09 | Stop reason: HOSPADM

## 2022-04-26 RX ORDER — LISINOPRIL 20 MG/1
20 TABLET ORAL DAILY
Status: CANCELLED | OUTPATIENT
Start: 2022-04-27

## 2022-04-26 RX ORDER — OLANZAPINE 10 MG/1
10 INJECTION, POWDER, LYOPHILIZED, FOR SOLUTION INTRAMUSCULAR
Status: CANCELLED | OUTPATIENT
Start: 2022-04-26

## 2022-04-26 RX ORDER — LISINOPRIL 10 MG/1
20 TABLET ORAL DAILY
Status: DISCONTINUED | OUTPATIENT
Start: 2022-04-27 | End: 2022-05-09 | Stop reason: HOSPADM

## 2022-04-26 RX ORDER — HYDROXYZINE HYDROCHLORIDE 25 MG/1
25 TABLET, FILM COATED ORAL
Status: DISCONTINUED | OUTPATIENT
Start: 2022-04-26 | End: 2022-05-09 | Stop reason: HOSPADM

## 2022-04-26 RX ORDER — AMOXICILLIN 250 MG
1 CAPSULE ORAL DAILY PRN
Status: DISCONTINUED | OUTPATIENT
Start: 2022-04-26 | End: 2022-04-28

## 2022-04-26 RX ORDER — HYDROXYZINE HYDROCHLORIDE 25 MG/1
50 TABLET, FILM COATED ORAL
Status: CANCELLED | OUTPATIENT
Start: 2022-04-26

## 2022-04-26 RX ORDER — HYDROXYZINE HYDROCHLORIDE 25 MG/1
100 TABLET, FILM COATED ORAL
Status: CANCELLED | OUTPATIENT
Start: 2022-04-26

## 2022-04-26 RX ORDER — AMOXICILLIN 250 MG
1 CAPSULE ORAL DAILY PRN
Status: CANCELLED | OUTPATIENT
Start: 2022-04-26

## 2022-04-26 RX ORDER — POLYETHYLENE GLYCOL 3350 17 G/17G
17 POWDER, FOR SOLUTION ORAL DAILY PRN
Status: CANCELLED | OUTPATIENT
Start: 2022-04-26

## 2022-04-26 RX ORDER — OLANZAPINE 2.5 MG/1
2.5 TABLET ORAL
Status: DISCONTINUED | OUTPATIENT
Start: 2022-04-26 | End: 2022-05-09 | Stop reason: HOSPADM

## 2022-04-26 RX ORDER — ASPIRIN 81 MG/1
81 TABLET, CHEWABLE ORAL DAILY
Status: DISCONTINUED | OUTPATIENT
Start: 2022-04-27 | End: 2022-05-09 | Stop reason: HOSPADM

## 2022-04-26 RX ORDER — DIPHENHYDRAMINE HYDROCHLORIDE 50 MG/ML
50 INJECTION INTRAMUSCULAR; INTRAVENOUS EVERY 6 HOURS PRN
Status: DISCONTINUED | OUTPATIENT
Start: 2022-04-26 | End: 2022-05-09 | Stop reason: HOSPADM

## 2022-04-26 RX ORDER — BENZTROPINE MESYLATE 1 MG/ML
1 INJECTION INTRAMUSCULAR; INTRAVENOUS 2 TIMES DAILY PRN
Status: CANCELLED | OUTPATIENT
Start: 2022-04-26

## 2022-04-26 RX ORDER — PROPRANOLOL HYDROCHLORIDE 10 MG/1
10 TABLET ORAL EVERY 8 HOURS PRN
Status: CANCELLED | OUTPATIENT
Start: 2022-04-26

## 2022-04-26 RX ORDER — ACETAMINOPHEN 325 MG/1
975 TABLET ORAL EVERY 6 HOURS PRN
Status: DISCONTINUED | OUTPATIENT
Start: 2022-04-26 | End: 2022-05-09 | Stop reason: HOSPADM

## 2022-04-26 RX ORDER — OLANZAPINE 10 MG/1
10 TABLET ORAL
Status: DISCONTINUED | OUTPATIENT
Start: 2022-04-26 | End: 2022-05-09 | Stop reason: HOSPADM

## 2022-04-26 RX ORDER — LORAZEPAM 2 MG/ML
2 INJECTION INTRAMUSCULAR EVERY 6 HOURS PRN
Status: DISCONTINUED | OUTPATIENT
Start: 2022-04-26 | End: 2022-05-09 | Stop reason: HOSPADM

## 2022-04-26 RX ORDER — ACETAMINOPHEN 325 MG/1
650 TABLET ORAL EVERY 4 HOURS PRN
Status: DISCONTINUED | OUTPATIENT
Start: 2022-04-26 | End: 2022-05-09 | Stop reason: HOSPADM

## 2022-04-26 RX ORDER — ZIPRASIDONE HYDROCHLORIDE 40 MG/1
40 CAPSULE ORAL 2 TIMES DAILY WITH MEALS
Status: DISCONTINUED | OUTPATIENT
Start: 2022-04-26 | End: 2022-04-26 | Stop reason: HOSPADM

## 2022-04-26 RX ORDER — DIVALPROEX SODIUM 500 MG/1
2000 TABLET, EXTENDED RELEASE ORAL
Status: DISCONTINUED | OUTPATIENT
Start: 2022-04-26 | End: 2022-05-05

## 2022-04-26 RX ORDER — OLANZAPINE 10 MG/1
5 INJECTION, POWDER, LYOPHILIZED, FOR SOLUTION INTRAMUSCULAR
Status: DISCONTINUED | OUTPATIENT
Start: 2022-04-26 | End: 2022-05-09 | Stop reason: HOSPADM

## 2022-04-26 RX ORDER — PROPRANOLOL HYDROCHLORIDE 10 MG/1
10 TABLET ORAL EVERY 8 HOURS PRN
Status: DISCONTINUED | OUTPATIENT
Start: 2022-04-26 | End: 2022-05-09 | Stop reason: HOSPADM

## 2022-04-26 RX ORDER — DIVALPROEX SODIUM 500 MG/1
2000 TABLET, EXTENDED RELEASE ORAL
Status: CANCELLED | OUTPATIENT
Start: 2022-04-26

## 2022-04-26 RX ORDER — ATORVASTATIN CALCIUM 40 MG/1
40 TABLET, FILM COATED ORAL
Status: CANCELLED | OUTPATIENT
Start: 2022-04-26

## 2022-04-26 RX ORDER — HYDROXYZINE HYDROCHLORIDE 25 MG/1
25 TABLET, FILM COATED ORAL
Status: CANCELLED | OUTPATIENT
Start: 2022-04-26

## 2022-04-26 RX ORDER — ACETAMINOPHEN 325 MG/1
650 TABLET ORAL EVERY 4 HOURS PRN
Status: CANCELLED | OUTPATIENT
Start: 2022-04-26

## 2022-04-26 RX ORDER — MELATONIN
1000 DAILY
Status: CANCELLED | OUTPATIENT
Start: 2022-04-27

## 2022-04-26 RX ORDER — ACETAMINOPHEN 325 MG/1
975 TABLET ORAL EVERY 6 HOURS PRN
Status: CANCELLED | OUTPATIENT
Start: 2022-04-26

## 2022-04-26 RX ORDER — LORAZEPAM 2 MG/ML
2 INJECTION INTRAMUSCULAR EVERY 6 HOURS PRN
Status: CANCELLED | OUTPATIENT
Start: 2022-04-26

## 2022-04-26 RX ORDER — BENZTROPINE MESYLATE 1 MG/ML
1 INJECTION INTRAMUSCULAR; INTRAVENOUS 2 TIMES DAILY PRN
Status: DISCONTINUED | OUTPATIENT
Start: 2022-04-26 | End: 2022-05-09 | Stop reason: HOSPADM

## 2022-04-26 RX ORDER — BENZTROPINE MESYLATE 1 MG/1
1 TABLET ORAL 2 TIMES DAILY PRN
Status: CANCELLED | OUTPATIENT
Start: 2022-04-26

## 2022-04-26 RX ORDER — OLANZAPINE 2.5 MG/1
2.5 TABLET ORAL
Status: CANCELLED | OUTPATIENT
Start: 2022-04-26

## 2022-04-26 RX ORDER — HYDROXYZINE 50 MG/1
100 TABLET, FILM COATED ORAL
Status: DISCONTINUED | OUTPATIENT
Start: 2022-04-26 | End: 2022-05-09 | Stop reason: HOSPADM

## 2022-04-26 RX ORDER — LEVOCARNITINE 1 G/10ML
330 SOLUTION ORAL EVERY MORNING
Status: CANCELLED | OUTPATIENT
Start: 2022-04-27

## 2022-04-26 RX ORDER — MELATONIN
1000 DAILY
Status: DISCONTINUED | OUTPATIENT
Start: 2022-04-27 | End: 2022-05-09 | Stop reason: HOSPADM

## 2022-04-26 RX ORDER — OLANZAPINE 5 MG/1
5 TABLET ORAL
Status: DISCONTINUED | OUTPATIENT
Start: 2022-04-26 | End: 2022-05-09 | Stop reason: HOSPADM

## 2022-04-26 RX ORDER — MINERAL OIL AND PETROLATUM 150; 830 MG/G; MG/G
1 OINTMENT OPHTHALMIC
Status: CANCELLED | OUTPATIENT
Start: 2022-04-26

## 2022-04-26 RX ADMIN — ZIPRASIDONE HYDROCHLORIDE 40 MG: 40 CAPSULE ORAL at 17:05

## 2022-04-26 RX ADMIN — OLANZAPINE 5 MG: 5 TABLET, FILM COATED ORAL at 20:17

## 2022-04-26 RX ADMIN — ATORVASTATIN CALCIUM 40 MG: 40 TABLET, FILM COATED ORAL at 20:17

## 2022-04-26 RX ADMIN — ZIPRASIDONE HYDROCHLORIDE 40 MG: 40 CAPSULE ORAL at 08:59

## 2022-04-26 RX ADMIN — DIVALPROEX SODIUM 2000 MG: 500 TABLET, EXTENDED RELEASE ORAL at 21:00

## 2022-04-26 NOTE — ED NOTES
Patient is accepted at [facility]  Patient is accepted by Dr Michelle Power per Cal Packer    Waiting for transport time      Nurse report is to be called to [phone] prior to patient transfer

## 2022-04-26 NOTE — ED NOTES
Insurance Authorization for admission:     Pt has Medicare A&B as primary  No precert required     Completed COB with Michael at [facility]

## 2022-04-26 NOTE — ED NOTES
Pt laying on back with eyes closed no distress noted 1:1 at bedside     Jonnathan Bender RN  04/26/22 6966

## 2022-04-26 NOTE — ED NOTES
Pt provided with snacks and drink per request  1:1 remains at bedside no distress noted     Crista Garcia RN  04/26/22 8315

## 2022-04-26 NOTE — ED NOTES
Pt updated on plan of care aware of transfer awaiting pu time  Pt used portable phone to make phone call no distress noted   1:1 remains at [address], RN  04/26/22 3527

## 2022-04-26 NOTE — ED NOTES
Pt laying in bed  1:1 continued  No needs expressed at this time        Andre Leung, RN  04/26/22 5697

## 2022-04-26 NOTE — ED NOTES
Patient asked writer about placement  Informed patient Rinku castle is reviewing  No further questions or concerns at the moment

## 2022-04-26 NOTE — ED NOTES
Updated on plan of care   Offers no complaints laying on back on bed 1:1 at bedside      Onelia Palacios RN  04/26/22 5138

## 2022-04-26 NOTE — ED NOTES
Pt sitting on side of bed drinking water states "im still hearing voices to tell me to cut my wrists or my throat and they're telling me my boyfriend doesn't love me" pt states "this has been on going for awhile I take medications but I dont know which ones" 1:1 remains at bedside   Provider aware pt takes home medications but unaware of them       Ivet Swanson RN  04/26/22 9512

## 2022-04-26 NOTE — PLAN OF CARE
Problem: Alteration in Thoughts and Perception  Goal: Refrain from acting on delusional thinking/internal stimuli  Description: Interventions:  - Monitor patient closely, per order   - Utilize least restrictive measures   - Set reasonable limits, give positive feedback for acceptable   - Administer medications as ordered and monitor of potential side effects  Outcome: Not Progressing  Goal: Agree to be compliant with medication regime, as prescribed and report medication side effects  Description: Interventions:  - Offer appropriate PRN medication and supervise ingestion; conduct AIMS, as needed   Outcome: Not Progressing

## 2022-04-26 NOTE — ED CARE HANDOFF
Emergency Department Sign Out Note        Sign out and transfer of care from Dr Stacey Sorto  See Separate Emergency Department note  The patient, Carroll Reddy, was evaluated by the previous provider for schizoaffective disorder, suicidal ideation  Workup Completed:  Patient required Zyprexa 40 mg PO this morning for auditory hallucinations  Patient takes this medication at home  ED Course / Workup Pending (followup): Patient is medically cleared for inpatient behavioral health  ED Course as of 04/26/22 1029   Tue Apr 26, 2022   0707 SO: 201 signed, schizoaffective, suicidal ideation,awaiting placement     Procedures  MDM        Disposition  Final diagnoses:   None     ED Disposition     None      MD Documentation      Most Recent Value   Sending MD [id number] [place] [address],[facility]  [address]      Follow-up Information    None       Patient's Medications   Discharge Prescriptions    No medications on file     No discharge procedures on file         ED Provider  Electronically Signed by     Johnathon Chandler DO  04/26/22 1030

## 2022-04-26 NOTE — ED NOTES
Confirmed with Dr Lily Polanco team, Mavis Mondragon MA, Dr Edith Morgan aware of PT/INR 46.4/5.0 and patient has been advised when to stop Warfarin. PT/INR ordered STAT DOS. Pt ambulated to bathroom with steady gait 1:1 remains at bedside     Surekha Jones RN  04/26/22 2594

## 2022-04-26 NOTE — ED NOTES
SL intake stated that they will have to review patient tomorrow for [facility] due to acuity on that floor at this time  Note from previous CW was to call [facility], called and they stated that they are currently full at this time  Followed up with [facility], still reviewing patient at this time  Anurag, they stated that they are currently full, and already have their beds for discharge slotted with patients for tomorrow, but if something changes will review tomorrow

## 2022-04-26 NOTE — ED NOTES
Pt resting with eyes closed 1:1 at bedside no distress noted     Jeanette Humphreys RN  04/26/22 4099

## 2022-04-26 NOTE — ED NOTES
CTS at bedside given chart and all belongings pt transported to [facility], [facility]  04/26/22 Tawana Campos 377

## 2022-04-26 NOTE — EMTALA/ACUTE CARE TRANSFER
[facility] [address]  Dept: [phone]      CJFKOB TRANSFER CONSENT    NAME Leydi Orlando 1970                              MRN [id number]    I have been informed of my rights regarding examination, treatment, and transfer   by Dr Holly Carbajal MD    Benefits: Specialized equipment and/or services available at the receiving facility (Include comment)________________________    Risks: Potential for delay in receiving treatment      Transfer Request   I acknowledge that my medical condition has been evaluated and explained to me by the emergency department physician or other qualified medical person and/or my attending physician who has recommended and offered to me further medical examination and treatment  I understand the Hospital's obligation with respect to the treatment and stabilization of my emergency medical condition  I nevertheless request to be transferred  I release the Hospital, the doctor, and any other persons caring for me from all responsibility or liability for any injury or ill effects that may result from my transfer and agree to accept all responsibility for the consequences of my choice to transfer, rather than receive stabilizing treatment at the Hospital  I understand that because the transfer is my request, my insurance may not provide reimbursement for the services  The Hospital will assist and direct me and my family in how to make arrangements for transfer, but the hospital is not liable for any fees charged by the transport service  In spite of this understanding, I refuse to consent to further medical examination and treatment which has been offered to me, and request transfer to [address], [address] : [facility]  I authorize the performance of emergency medical procedures and treatments upon me in both transit and upon arrival at the receiving facility  Additionally, I authorize the release of any and all medical records to the receiving facility and request they be transported with me, if possible  I authorize the performance of emergency medical procedures and treatments upon me in both transit and upon arrival at the receiving facility  Additionally, I authorize the release of any and all medical records to the receiving facility and request they be transported with me, if possible  I understand that the safest mode of transportation during a medical emergency is an ambulance and that the Hospital advocates the use of this mode of transport  Risks of traveling to the receiving facility by car, including absence of medical control, life sustaining equipment, such as oxygen, and medical personnel has been explained to me and I fully understand them  (PRAKASH CORRECT BOX BELOW)  [  ]  I consent to the stated transfer and to be transported by ambulance/helicopter  [  ]  I consent to the stated transfer, but refuse transportation by ambulance and accept full responsibility for my transportation by car  I understand the risks of non-ambulance transfers and I exonerate the Hospital and its staff from any deterioration in my condition that results from this refusal     X___________________________________________    DATE  22  TIME________  Signature of patient or legally responsible individual signing on patient behalf           RELATIONSHIP TO PATIENT_________________________          Provider Certification    NAME Leydi Burger                                         1970                              MRN [id number]    A medical screening exam was performed on the above named patient  Based on the examination:    Condition Necessitating Transfer The encounter diagnosis was Mood disorder ([facility] [address] )      Patient Condition: The patient has been stabilized such that within reasonable medical probability, no material deterioration of the patient condition or the condition of the unborn child(courtney) is likely to result from the transfer    Reason for Transfer: Level of Care needed not available at this facility    Transfer Requirements: [address]   · Space available and qualified personnel available for treatment as acknowledged by    · Agreed to accept transfer and to provide appropriate medical treatment as acknowledged by       Hank Funk  · Appropriate medical records of the examination and treatment of the patient are provided at the time of transfer   [facility], [address] _______  · Transfer will be performed by qualified personnel from    and appropriate transfer equipment as required, including the use of necessary and appropriate life support measures  Provider Certification: I have examined the patient and explained the following risks and benefits of being transferred/refusing transfer to the patient/family:  General risk, such as traffic hazards, adverse weather conditions, rough terrain or turbulence, possible failure of equipment (including vehicle or aircraft), or consequences of actions of persons outside the control of the transport personnel      Based on these reasonable risks and benefits to the patient and/or the unborn child(courtney), and based upon the information available at the time of the patients examination, I certify that the medical benefits reasonably to be expected from the provision of appropriate medical treatments at another medical facility outweigh the increasing risks, if any, to the individuals medical condition, and in the case of labor to the unborn child, from effecting the transfer      X____________________________________________ DATE 04/26/22        TIME_______      ORIGINAL - SEND TO MEDICAL RECORDS   COPY - SEND WITH PATIENT DURING TRANSFER

## 2022-04-27 PROBLEM — E87.1 HYPONATREMIA: Status: ACTIVE | Noted: 2022-04-27

## 2022-04-27 PROBLEM — Z00.8 MEDICAL CLEARANCE FOR PSYCHIATRIC ADMISSION: Status: ACTIVE | Noted: 2022-04-27

## 2022-04-27 PROBLEM — F32.3 MAJOR DEPRESSION WITH PSYCHOTIC FEATURES (HCC): Status: ACTIVE | Noted: 2020-11-29

## 2022-04-27 PROBLEM — R56.9 SEIZURES (HCC): Status: ACTIVE | Noted: 2022-04-27

## 2022-04-27 PROCEDURE — 99223 1ST HOSP IP/OBS HIGH 75: CPT | Performed by: PSYCHIATRY & NEUROLOGY

## 2022-04-27 PROCEDURE — 99222 1ST HOSP IP/OBS MODERATE 55: CPT | Performed by: PHYSICIAN ASSISTANT

## 2022-04-27 RX ORDER — PRAZOSIN HYDROCHLORIDE 1 MG/1
1 CAPSULE ORAL
Status: DISCONTINUED | OUTPATIENT
Start: 2022-04-27 | End: 2022-05-03

## 2022-04-27 RX ORDER — HALOPERIDOL 5 MG
5 TABLET ORAL
Status: DISCONTINUED | OUTPATIENT
Start: 2022-04-27 | End: 2022-04-29

## 2022-04-27 RX ORDER — MIRTAZAPINE 15 MG/1
15 TABLET, FILM COATED ORAL
Status: DISCONTINUED | OUTPATIENT
Start: 2022-04-27 | End: 2022-05-01

## 2022-04-27 RX ORDER — AMOXICILLIN 250 MG
1 CAPSULE ORAL DAILY
Status: DISCONTINUED | OUTPATIENT
Start: 2022-04-27 | End: 2022-04-28

## 2022-04-27 RX ORDER — IBUPROFEN 600 MG/1
600 TABLET ORAL EVERY 6 HOURS PRN
Status: DISCONTINUED | OUTPATIENT
Start: 2022-04-27 | End: 2022-05-09 | Stop reason: HOSPADM

## 2022-04-27 RX ADMIN — ATORVASTATIN CALCIUM 40 MG: 40 TABLET, FILM COATED ORAL at 15:44

## 2022-04-27 RX ADMIN — ASPIRIN 81 MG CHEWABLE TABLET 81 MG: 81 TABLET CHEWABLE at 08:02

## 2022-04-27 RX ADMIN — OLANZAPINE 10 MG: 10 TABLET, FILM COATED ORAL at 12:04

## 2022-04-27 RX ADMIN — SENNOSIDES AND DOCUSATE SODIUM 1 TABLET: 50; 8.6 TABLET ORAL at 15:45

## 2022-04-27 RX ADMIN — PRAZOSIN HYDROCHLORIDE 1 MG: 1 CAPSULE ORAL at 21:15

## 2022-04-27 RX ADMIN — MIRTAZAPINE 15 MG: 15 TABLET, FILM COATED ORAL at 21:15

## 2022-04-27 RX ADMIN — DIVALPROEX SODIUM 2000 MG: 500 TABLET, EXTENDED RELEASE ORAL at 21:15

## 2022-04-27 RX ADMIN — LEVOCARNITINE 330 MG: 1 SOLUTION ORAL at 08:02

## 2022-04-27 RX ADMIN — IBUPROFEN 600 MG: 600 TABLET ORAL at 15:44

## 2022-04-27 RX ADMIN — Medication 1000 UNITS: at 08:02

## 2022-04-27 RX ADMIN — HALOPERIDOL 5 MG: 5 TABLET ORAL at 21:15

## 2022-04-27 RX ADMIN — LISINOPRIL 20 MG: 10 TABLET ORAL at 08:02

## 2022-04-27 NOTE — PROGRESS NOTES
04/27/22 1041   Team Meeting   Meeting Type Daily Rounds   Team Members Present   Team Members Present Physician;Nurse;   Physician Team Member Dr Marvel Andrews Team Member [address] Management Team Member Mamadou   Patient/Family Present   Patient Present No   Patient's Family Present No   Pt is a new admit from [facility] ED on a 12 for suicidal ideation with a plan to walk into traffic  Pt's readmit score is 25  Discharge to be determined

## 2022-04-27 NOTE — ASSESSMENT & PLAN NOTE
Patient is medically cleared for admission to the [facility] for treatment of the underlying psychiatric illness

## 2022-04-27 NOTE — PROGRESS NOTES
04/27/22 1000   Activity/Group Checklist   Group Other (Comment)  (Group Art Therapy/ Studio-Based, Open Choice)   Attendance Attended   Attendance Duration (min) 16-30  (Left for a meeting with her specialist)   Interactions Interacted appropriately   Affect/Mood Appropriate   Goals Achieved Other (Comment)  (Able to engage the materials)

## 2022-04-27 NOTE — PROGRESS NOTES
Met with Leydi and completed her admission self assessment  Her stressors are feeling suicidal and hearing voices  She likes the fact she has a good boyfriend named Iza Conde in her life  What she likes least is always feeling negative  She is not employed and has graduated high school  She enjoys reading, music, word search and puzzles  She wants to learn about self esteem and relaxation   She will know she is ready for discharge when she is no longer suicidal
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
## 2022-04-27 NOTE — PROGRESS NOTES
BELL Group Note     04/27/22 1430   Activity/Group Checklist   Group Life Skills  (Quotes and Perspectives)   Attendance Attended   Attendance Duration (min) 16-30  (pulled by clinician)   Interactions Interacted appropriately  (verbally scant)   Affect/Mood Appropriate;Calm   Goals Achieved Able to listen to others; Able to engage in interactions; Able to recieve feedback; Able to give feedback to another  (received resources/benefited from social presence of group)

## 2022-04-27 NOTE — ASSESSMENT & PLAN NOTE
· Sodium at 130 on 04/26/22, up from 129 on 04/25/22  · Restrict daily fluid intake to 2L  · Recheck BMP tomorrow

## 2022-04-27 NOTE — NURSING NOTE
47year old female admitted on 201 from [facility]  ED Patient is self admitted after  telling her PCP that she is suicidal  with a plan to walk into traffic  Patient lives at [facility]    Patient has a long psych hx   Pt has past suicide attempts by OD and walking into traffic patient was recently on psych floor 4/11  Patient reported that she was discharged early , and she was not ready for discharge   On admission   PT is AAOx4,  pleasant, and is cooperative with [address] and body assessment  PT reported AH which tells her she is useless  Patient reported that voices make her fell agitated, patient received Zyprexa 5 mg @ 2017   Patient denies street drug use, alcohol, also reported quiet smoking 2 years ago  Patient denies HI  Pt  was educated on medication compliance, proper hydration, and when to come to staff if anxiety or depression becomes overwhelming  Will continue to monitor and provide therapeutic support  Oriented to unit and regulations; unit expectations reviewed  Q 7 minute checks initiated  CSSRS High Risk, reviewed case w Kris Kimball of [place], 1:1 not indicated at this time

## 2022-04-27 NOTE — NURSING NOTE
Pt re-evaluated for pain in the hips  Pt stated having 3/10 pain and the Motrin was effective  Will continue to monitor

## 2022-04-27 NOTE — SOCIAL WORK
Worker received return phone call from Pt's  [phone]) at [facility] who reported Pt has been in their program for almost 2 years and Pt seems to have been in the cycle of going inpatient and then quickly returning to the hospital prior to Pt's admission to their program  Sanam advised Pt's inpatient hospitalizations also seem to be correlated to when her BF goes into the hospital  Sanam advised that Pt's BF is currently hospitalized and Pt appeared to be triggered by this after a bad phone call with BF  Sanam advised the longest Pt has gone without inpatient hospitalization is 53 days  Sanam reiterated the information from [facility] that Pt tends to do best with short length of stay and returning to the community  Sanam advised prior to d/c she will need Pt's medications sent to [facility] pharmacy ([facility], [place], [address]  Phone: [phone] IGA: [phone])  Pt will also need a negative covid test prior to discharge  Sanam advised [address] [place] [address] is a Personal care boarding [place] and as such, Pt will need all PRN medications written out for the pharmacy as well

## 2022-04-27 NOTE — NURSING NOTE
Pt was calm, preoccupied, disorganized, responding to internal stimuli, visible in the periphery, and minimally interactive with peers  Pt c/o AH to hurt self  Pt was able to contract for safety and pt trying to keep self occupied by reading the newspaper or talking with staff  Although +AH, pt denies SI/HI/VH  Pt was compliant with medications and meals  Pt encouraged talking with staff with any concerns or questions about treatment  Pt was receptive  Pt c/o increased +AH, PRN Zyprexa 10 mg PO to help with increased agitation due to voices  Will continue to monitor

## 2022-04-27 NOTE — ASSESSMENT & PLAN NOTE
Patient is medically cleared for admission to the [facility] for treatment of the underlying psychiatric illness

## 2022-04-27 NOTE — SOCIAL WORK
Worker spoke with Mariana Mcpherson at [facility] ([phone]) who advised he is the assigned care manager for [facility]  Mariana Wisece indicated he would like to provide information on Pt and stated she came from [facility] and seems to be in a cycle of inpatient hospitalizations for the past year and a half  Pt is reported to experience SI, sign herself into the hospital, stabilize on medications and then spend around a week at home before re-presenting to the hospital  Mariana Mcpherson stated Pt receives all services through [facility] and he would not recommend referrals to other services at this time as they would be a duplication of services  [facility] stated it appears that Pt does best when her length of stay is kept short

## 2022-04-27 NOTE — PLAN OF CARE
BELL Group Note  Pt attended 2 out of 3 groups in the past 12 hours    Problem: Ineffective Coping  Goal: Participates in unit activities  Description: Interventions:  - Provide therapeutic environment   - Provide required programming   - Redirect inappropriate behaviors   Outcome: Progressing

## 2022-04-27 NOTE — ASSESSMENT & PLAN NOTE
· BP stable since arrival to the unit  · Continue pre hospital lisinopril 20mg QD  · Monitor VS per unit protocol

## 2022-04-27 NOTE — SOCIAL WORK
Worker called [employer] regarding Pt  Worker left a voicemail for [place]  Worker called [facility] [phone] and advised of inpatient admission  Worker was advised Pt's OP appointments had already been cancelled as they were notified when Pt was in ED

## 2022-04-27 NOTE — DISCHARGE INSTR - APPOINTMENTS
Jerad Preciado RN, our [employer], will be calling you after your discharge, on the phone number that you provided  She will be available as an additional support, if needed  If you wish to speak with her, you may contact Daily Kidd at [phone]

## 2022-04-27 NOTE — TREATMENT PLAN
TREATMENT PLAN REVIEW - Dg [age] y o  1970 female MRN: [id number]    [address] [address] Room / Bed: [address] [address] Encounter: [id number]        Admit Date/Time:  4/26/2022  6:45 PM    Treatment Team: Attending Provider: Dorie Lema MD; Care Manager: Sharon Rivera LPC; Registered Nurse: Stacey Scott RN; Patient Care Assistant: Yelena Emmanuel;  Patient Care Technician: Uli Alejandre; Resident: Harry Malik MD; : Kai Merritt    Diagnosis: Principal Problem:    Major depression with psychotic features [facility])  Active Problems:    Primary hypertension    Hyperlipidemia    Class 3 severe obesity due to excess calories without serious comorbidity in adult [facility])    Medical clearance for psychiatric admission    [facility])      Patient Strengths/Assets: cooperative, communication skills, compliant with medication, good past treatment response, good support system, motivated, negotiates basic needs, patient is on a voluntary commitment, self reliant, stable housing, support at group home, supportive family      Patient Barriers/Limitations: chronic mental illness, intellectual disability, limited family ties, limited insight, relationship issues    Short Term Goals: decrease in depressive symptoms, decrease in psychotic symptoms, decrease in suicidal thoughts, ability to stay safe on the unit, ability to stay free from restraints, improvement in insight, sleep improvement, improvement in appetite, tolerate medications, increase in group participation, increase in socialization with peers on the unit    Long Term Goals: improvement in depression, improvement in anxiety, free of suicidal thoughts, no self abusive behavior, resolution of psychotic symptoms, improved insight, no agitation on the unit, no aggressive behavior on the unit, adequate self care, adequate sleep, adequate appetite, appropriate interaction with peers    Progress Towards Goals: starting psychiatric medications as prescribed    Recommended Treatment: medication management, patient medication education, group therapy, milieu therapy, continued Behavioral Health psychiatric evaluation/assessment process     Treatment Frequency: daily medication monitoring, group and milieu therapy daily, monitoring through interdisciplinary rounds, monitoring through weekly patient care conferences    Expected Discharge Date: 7 days - 5/4/2022    Discharge Plan: referrals as indicated, return to previous living arrangement, follow up with established outpatient psychiatrist for medication management    Treatment Plan Created/Updated By: Dottie Gomez MD

## 2022-04-27 NOTE — CONSULTS
Tuan Hollis 59 1970, 47 y o  female MRN: [id number]  Unit/Bed#: [facility] 344-02 Encounter: [id number]  Primary Care Provider: Elke Rosas MD   Date and time admitted to hospital: 4/26/2022  6:45 PM    Inpatient consult for Medical Clearance for [place] patient  Consult performed by: Leticia Bansal PA-C  Consult ordered by: Valerie Barker MD        Medical clearance for psychiatric admission  Assessment & Plan  Patient is medically cleared for admission to the [facility] for treatment of the underlying psychiatric illness    Hyponatremia  Assessment & Plan  · Sodium at 130 on 04/26/22, up from 129 on 04/25/22  · Restrict daily fluid intake to 2L  · Recheck BMP tomorrow     Seizures ([facility] [address] )  Assessment & Plan  · Continue pre hospital Depakote ER 2000mg QHS    Class 3 severe obesity due to excess calories without serious comorbidity in adult [facility])  Assessment & Plan  · BMI 42 8  · Encouraged lifestyle modifications    Hyperlipidemia  Assessment & Plan  · Continue pre hospital atorvastatin 40mg qd    Primary hypertension  Assessment & Plan  · BP stable since arrival to the unit  · Continue pre hospital lisinopril 20mg QD  · Monitor VS per unit protocol    * Major depression with psychotic features [facility])  Assessment & Plan  · Management per psychiatry    ECT Clearance:   History of recent seizure or stroke:  No - last seizure > 5 years ago   History of pheochromocytoma:  no   History of active bleeding (Intracranial hemorrhage, aneurysm or AVM):  no   History of metallic implants in the head or neck:  no   History of increased intracranial pressure with mass effect:  no  ·   EKG within 3 months? Yes - 04/25/22  o If yes, was an arrhythmia present and at baseline?  No - NSR  o If yes, what is the baseline QT interval? 348/401  o If no, obtain prior to ECT for arrhythmia evaluation and baseline QT interval      Based on above criteria, Patient is not medically cleared for ECT should it be recommended  Would recommend neurology consult if ECT is recommended  Counseling / Coordination of Care Time: 30 minutes  Greater than 50% of total time spent on patient counseling and coordination of care  Collaboration of Care: Were Recommendations Directly Discussed with Primary Treatment Team? - No     History of Present Illness:    Ric Preston is a 47 y o  female who is originally admitted to the psychiatry service due to [place] [address]  We are consulted for medical clearance for admission to [place] [facility] and treatment of underlying psychiatric illness  This patient has a past medical history significant for HLD, HTN and seizures  Reports last seizure    On evaluation patient denies any physical complaints such as headache, dizziness, chest pain, shortness of breath, nausea/vomiting/diarrhea at this time  Labs ECG were reviewed  Review of Systems:    Review of Systems   Constitutional: Negative for activity change, chills, diaphoresis and fever  HENT: Negative for congestion, rhinorrhea, sinus pressure, sinus pain and sore throat  Eyes: Negative for visual disturbance  Respiratory: Negative for cough, shortness of breath and wheezing  Cardiovascular: Negative for chest pain and palpitations  Gastrointestinal: Negative for abdominal distention, abdominal pain, constipation, diarrhea, nausea and vomiting  Genitourinary: Negative for dysuria, frequency, hematuria and urgency  Musculoskeletal: Positive for back pain  Negative for arthralgias and myalgias  Skin: Negative for rash  Neurological: Negative for dizziness, weakness, light-headedness and headaches         Past Medical and Surgical History:     Past Medical History:   Diagnosis Date    Anxiety     Bipolar 1 disorder ([facility] )     Bipolar affective disorder, depressed, severe ([facility] ) 10/10/2021    Borderline personality disorder ([facility] )     CAD (coronary artery disease)     Cognitive impairment     Depression     Dyslipidemia     GERD (gastroesophageal reflux disease)     Hypertension     Obesity     Psychiatric disorder     PTSD (post-traumatic stress disorder)     Schizoaffective disorder (HCC)     Seizure ([facility] [address] )        Past Surgical History:   Procedure Laterality Date    APPENDECTOMY      PATIENT DENIES HAVING APPENDIX REMOVED    CHOLECYSTECTOMY      FOOT SURGERY Right        Meds/Allergies:    all medications and allergies reviewed    Allergies: Allergies   Allergen Reactions    Fish Oil - Food Allergy Other (See Comments)     unknown    Fish-Derived Products - Food Allergy Hives       Social History:     Marital Status: Single    Substance Use History:   Social History     Substance and Sexual Activity   Alcohol Use Not Currently     Social History     Tobacco Use   Smoking Status Never Smoker   Smokeless Tobacco Never Used   Tobacco Comment    Pt stated "smokes when stressed"     Social History     Substance and Sexual Activity   Drug Use Not Currently       Family History:    non-contributory    Physical Exam:     Vitals:   Blood Pressure: 156/73 (04/27/22 0757)  Pulse: 92 (04/27/22 0757)  Temperature: 97 8 °F (36 6 °C) (04/27/22 0757)  Temp Source: Temporal (04/27/22 0757)  Respirations: 16 (04/27/22 0757)  Height: 5' 6" (167 6 cm) (04/26/22 1848)  Weight - Scale: 120 kg (265 lb 3 2 oz) (04/26/22 1848)  SpO2: 97 % (04/27/22 0757)    Physical Exam  Constitutional:       General: She is not in acute distress  Appearance: Normal appearance  She is not ill-appearing, toxic-appearing or diaphoretic  HENT:      Head: Normocephalic and atraumatic  Nose: Nose normal  No congestion or rhinorrhea  Mouth/Throat:      Mouth: Mucous membranes are moist    Eyes:      General: No scleral icterus  Extraocular Movements: Extraocular movements intact  Cardiovascular:      Rate and Rhythm: Normal rate and regular rhythm  Heart sounds: Normal heart sounds   No murmur heard       Pulmonary:      Effort: Pulmonary effort is normal  No respiratory distress  Breath sounds: Normal breath sounds  No wheezing  Abdominal:      General: Abdomen is flat  Bowel sounds are normal  There is no distension  Palpations: Abdomen is soft  Tenderness: There is no abdominal tenderness  Musculoskeletal:      Right lower leg: No edema  Left lower leg: No edema  Skin:     General: Skin is warm and dry  Coloration: Skin is not jaundiced  Neurological:      General: No focal deficit present  Mental Status: She is alert and oriented to person, place, and time  Psychiatric:         Mood and Affect: Mood normal          Additional Data:     Lab Results: I have personally reviewed pertinent reports  Results from last 7 days   Lab Units 04/25/22  1747   WBC Thousand/uL 10 24*   HEMOGLOBIN g/dL 13 4   HEMATOCRIT % 39 8   PLATELETS Thousands/uL 225   NEUTROS PCT % 68   LYMPHS PCT % 21   MONOS PCT % 9   EOS PCT % 1     Results from last 7 days   Lab Units 04/26/22  1042   SODIUM mmol/L 130*   POTASSIUM mmol/L 5 0   CHLORIDE mmol/L 98*   CO2 mmol/L 29   BUN mg/dL 14   CREATININE mg/dL 0 56*   ANION GAP mmol/L 3*   CALCIUM mg/dL 9 4   ALBUMIN g/dL 3 0*   TOTAL BILIRUBIN mg/dL 0 72   ALK PHOS U/L 64   ALT U/L 17   AST U/L 29   GLUCOSE RANDOM mg/dL 94             Lab Results   Component Value Date/Time    HGBA1C 4 8 12/31/2021 07:22 AM    HGBA1C 5 2 12/21/2019 08:10 AM    HGBA1C 4 8 10/19/2019 07:17 AM           EKG, Pathology, and Other Studies Reviewed on Admission:   · EKG  Normal sinus rhythm  Normal ECG  When compared with ECG of 11-APR-2022 10:20,  No significant change was found  Confirmed by Elaine Charles ([id number]) on 4/25/2022 8:14:53 PM    ** Please Note: This note has been constructed using a voice recognition system   **

## 2022-04-27 NOTE — PLAN OF CARE
Problem: Alteration in Thoughts and Perception  Goal: Refrain from acting on delusional thinking/internal stimuli  Description: Interventions:  - Monitor patient closely, per order   - Utilize least restrictive measures   - Set reasonable limits, give positive feedback for acceptable   - Administer medications as ordered and monitor of potential side effects  Outcome: Progressing  Goal: Agree to be compliant with medication regime, as prescribed and report medication side effects  Description: Interventions:  - Offer appropriate PRN medication and supervise ingestion; conduct AIMS, as needed   Outcome: Progressing     Problem: Risk for Self Injury/Neglect  Goal: Verbalize thoughts and feelings  Description: Interventions:  - Assess and re-assess patient's lethality and potential for self-injury  - Engage patient in 1:1 interactions, daily, for a minimum of 15 minutes  - Encourage patient to express feelings, fears, frustrations, hopes  - Establish rapport/trust with patient   Outcome: Progressing  Goal: Refrain from harming self  Description: Interventions:  - Monitor patient closely, per order  - Develop a trusting relationship  - Supervise medication ingestion, monitor effects and side effects   Outcome: Progressing     Problem: Depression  Goal: Refrain from isolation  Description: Interventions:  - Develop a trusting relationship   - Encourage socialization   Outcome: Progressing     Problem: Alteration in Orientation  Goal: Express concerns related to confused thinking related to:  Description: Interventions:  - Encourage patient to express feelings, fears, frustrations, hopes  - Assign consistent caregivers   - [place]/re-orient patient as needed  - Allow comfort items, as appropriate  - Provide visual cues, signs, etc    Outcome: Progressing  Goal: Cooperate with recommended testing/procedures  Description: Interventions:  - Determine need for ancillary testing  - Observe for mental status changes  - Implement falls/precaution protocol   Outcome: Progressing

## 2022-04-28 LAB
ANION GAP SERPL CALCULATED.3IONS-SCNC: 9 MMOL/L (ref 5–14)
ATRIAL RATE: 83 BPM
BUN SERPL-MCNC: 15 MG/DL (ref 5–25)
CALCIUM SERPL-MCNC: 9.1 MG/DL (ref 8.4–10.2)
CHLORIDE SERPL-SCNC: 95 MMOL/L (ref 97–108)
CHOLEST SERPL-MCNC: 169 MG/DL
CO2 SERPL-SCNC: 30 MMOL/L (ref 22–30)
CREAT SERPL-MCNC: 0.57 MG/DL (ref 0.6–1.2)
GFR SERPL CREATININE-BSD FRML MDRD: 107 ML/MIN/1.73SQ M
GLUCOSE P FAST SERPL-MCNC: 95 MG/DL (ref 70–99)
GLUCOSE SERPL-MCNC: 95 MG/DL (ref 70–99)
HCG SERPL QL: NEGATIVE
HDLC SERPL-MCNC: 45 MG/DL
LDLC SERPL CALC-MCNC: 88 MG/DL
NONHDLC SERPL-MCNC: 124 MG/DL
P AXIS: 62 DEGREES
POTASSIUM SERPL-SCNC: 4.1 MMOL/L (ref 3.6–5)
PR INTERVAL: 178 MS
QRS AXIS: 39 DEGREES
QRSD INTERVAL: 86 MS
QT INTERVAL: 376 MS
QTC INTERVAL: 441 MS
SODIUM SERPL-SCNC: 134 MMOL/L (ref 137–147)
T WAVE AXIS: 75 DEGREES
TRIGL SERPL-MCNC: 180 MG/DL
VENTRICULAR RATE: 83 BPM

## 2022-04-28 PROCEDURE — 84703 CHORIONIC GONADOTROPIN ASSAY: CPT

## 2022-04-28 PROCEDURE — 80048 BASIC METABOLIC PNL TOTAL CA: CPT

## 2022-04-28 PROCEDURE — 93010 ELECTROCARDIOGRAM REPORT: CPT | Performed by: INTERNAL MEDICINE

## 2022-04-28 PROCEDURE — 99232 SBSQ HOSP IP/OBS MODERATE 35: CPT | Performed by: STUDENT IN AN ORGANIZED HEALTH CARE EDUCATION/TRAINING PROGRAM

## 2022-04-28 PROCEDURE — 80061 LIPID PANEL: CPT

## 2022-04-28 PROCEDURE — 93005 ELECTROCARDIOGRAM TRACING: CPT

## 2022-04-28 RX ORDER — AMOXICILLIN 250 MG
1 CAPSULE ORAL 2 TIMES DAILY
Status: DISCONTINUED | OUTPATIENT
Start: 2022-04-29 | End: 2022-05-09 | Stop reason: HOSPADM

## 2022-04-28 RX ADMIN — ATORVASTATIN CALCIUM 40 MG: 40 TABLET, FILM COATED ORAL at 17:17

## 2022-04-28 RX ADMIN — DIVALPROEX SODIUM 2000 MG: 500 TABLET, EXTENDED RELEASE ORAL at 21:10

## 2022-04-28 RX ADMIN — LEVOCARNITINE 330 MG: 1 SOLUTION ORAL at 08:20

## 2022-04-28 RX ADMIN — POLYETHYLENE GLYCOL 3350 17 G: 17 POWDER, FOR SOLUTION ORAL at 20:51

## 2022-04-28 RX ADMIN — ASPIRIN 81 MG CHEWABLE TABLET 81 MG: 81 TABLET CHEWABLE at 08:21

## 2022-04-28 RX ADMIN — Medication 1000 UNITS: at 08:20

## 2022-04-28 RX ADMIN — HALOPERIDOL 5 MG: 5 TABLET ORAL at 21:10

## 2022-04-28 RX ADMIN — SENNOSIDES AND DOCUSATE SODIUM 1 TABLET: 50; 8.6 TABLET ORAL at 08:19

## 2022-04-28 RX ADMIN — PRAZOSIN HYDROCHLORIDE 1 MG: 1 CAPSULE ORAL at 21:10

## 2022-04-28 RX ADMIN — OLANZAPINE 10 MG: 10 TABLET, FILM COATED ORAL at 12:10

## 2022-04-28 RX ADMIN — MIRTAZAPINE 15 MG: 15 TABLET, FILM COATED ORAL at 21:10

## 2022-04-28 RX ADMIN — LISINOPRIL 20 MG: 10 TABLET ORAL at 08:19

## 2022-04-28 NOTE — PROGRESS NOTES
Progress Note - Behavioral Health   Amy Annabella Skiff 47 y o  female MRN: [id number]  Unit/Bed#: U 344-02 Encounter: [id number]    Assessment/Plan   Principal Problem:    Major depression with psychotic features ([facility] )  Active Problems:    Primary hypertension    Hyperlipidemia    Class 3 severe obesity due to excess calories without serious comorbidity in adult [facility])    Medical clearance for psychiatric admission    Seizures ([facility] )    Hyponatremia      Recommended Treatment:     1  Continue with medications:  a  Continue Depakote 200mg ER for seizures  Will check serum levels on Saturday 4/30/22   b  Continue Haldol 5mg HS for psychosis with plan for continued titration  c  Continue Remeron 15mg for mood/insomnia with plan for continued titration  d  Continue Minipress 1mg HS for nightmares with plan for continued titration  2  Continue with group therapy, milieu therapy and occupational therapy  3  Continue frequent safety checks and vitals per unit protocol  4  Continue with SLIM medical management as indicated  5  Continue coordinating with case management regarding disposition  6  D/c fluid restriction per SLIM; Na 134 this AM, up from 130 yesterday  Recheck BMP tomorrow morning  Case discussed with treatment team   Risks, benefits and possible side effects of Medications: Risks, benefits, and possible side effects of medications have been explained to the patient, who verbalizes understanding    Legal Status: 201   Diet: Regular    ------------------------------------------------------------    Subjective: Per nursing report, Leydi tends to keep to herself but has been pleasant and cooperative when approached  She is compliant with her medications  Last night in the evening and this morning it was reported that she has been expressing CAH to harm herself  The voice she is hearing is her own, though she expresses the ability and willingness to contract for safety on the unit      During our interview today, Leydi still reports CAH to harm herself  She reports that the voice is that of her ex-boyfriend and her own telling her to slit her wrists and her throat  She also expresses suicidal ideation through walking out into traffic  She reports her coping mechanisms are listening to music and completing word search puzzles  She also continues to endorse her willingness to contract for safety on the unit, and expresses that she does feel safe here  On evaluation, Leydi is calm and cooperative  She states her mood is "depressed " She reports having trouble falling asleep, and her energy level today is low  She also reports a continuation of her nightmares about her previous trauma with her ex-boyfriend  Her appetite has been appropriate  She denies any adverse effects from medications  Her goals for today are to attend the groups and to continue coping with her negative thoughts  Today, Leydi is layo for safety on the unit  She denies visual hallucinations, paranoid thoughts or ideation  PRNs overnight:  - Motrin 600mg 4/27/22 @1544 for bilateral hip pain  - Zyprexa 10mg 4/27/22 @ 1204    VS: Reviewed, within normal limits    Progress Toward Goals: Unchanged     Psychiatric Review of Systems:  Behavior over the last 24 hours: unchanged  Sleep: poor, experiences nightmares about her trauma/ex-boyfriend  Appetite: adequate  Medication side effects: none verbalized  Medical ROS: Complete review of systems is negative except as noted above      Vital signs in last 24 hours:  Temp:  [97 °F (36 1 °C)-97 7 °F (36 5 °C)] 97 °F (36 1 °C)  HR:  [87-92] 90  Resp:  [16-18] 16  BP: (108-155)/(59-71) 155/71    Mental Status Exam:  Appearance:  alert, good eye contact, appears stated age, casually dressed, appropriate grooming and hygiene, obese and overtly appearing  female, in no acute distress   Behavior:  Calm and cooperative   Motor: normal gait and balance and slightly restless and fidgety   Speech:  spontaneous, normal rate, normal volume, scant and coherent   Mood:  "depressed"   Affect:  blunted   Thought Process:  relatively linear, concrete   Thought Content: no verbalized delusions or overt paranoia, negative thoughts   Perceptual disturbances: does not appear to be responding to internal stimuli at this time and command auditory hallucinations telling her to cut her wrists and her throat, denies visual hallucinations  Risk Potential: active suicidal ideation with plan to walk in front of traffic, cut her wrists and throat  Denies homicidal ideation  Can contract for safety on the unit     Cognition: cognition not formally tested   Insight:  Limited   Judgment: Limited     Current Medications:  Current Facility-Administered Medications   Medication Dose Route Frequency Provider Last Rate    acetaminophen  650 mg Oral Q6H PRN Harry Malik MD      acetaminophen  650 mg Oral Q4H PRN Harry Malik MD      acetaminophen  975 mg Oral Q6H PRN Harry Malik MD      aluminum-magnesium hydroxide-simethicone  30 mL Oral Q4H PRN Harry Malik MD      artificial tear  1 application Both Eyes U1Z PRN Harry Malik MD      aspirin  81 mg Oral Daily Harry Malik MD      atorvastatin  40 mg Oral Daily With Slime Suh MD      benztropine  1 mg Intramuscular BID PRN Harry Malik MD      benztropine  1 mg Oral BID PRN Harry Malik MD      cholecalciferol  1,000 Units Oral Daily Harry Malik MD      hydrOXYzine HCL  50 mg Oral Q6H PRN Max 4/day Harry Malik MD      Or    diphenhydrAMINE  50 mg Intramuscular Q6H PRN Harry Malik MD      divalproex sodium  2,000 mg Oral HS Harry Malik MD      haloperidol  5 mg Oral HS Harry Malik MD      hydrOXYzine HCL  100 mg Oral Q6H PRN Max 4/day Harry Malik MD      Or    LORazepam  2 mg Intramuscular Q6H PRN MD Judd Malone hydrOXYzine HCL  25 mg Oral Q6H PRN Max 4/day Jane Carlin MD      ibuprofen  600 mg Oral Q6H PRN Raiza Ackerman PA-C      levOCARNitine  330 mg/day Oral QABIANCA Carlin MD      lisinopril  20 mg Oral Daily Jane Carlin MD      melatonin  3 mg Oral HS PRN Jane Carlin MD      mirtazapine  15 mg Oral HS Jane Carlin MD      OLANZapine  10 mg Oral Q3H PRN Max 3/day Jane Carlin MD      Or    OLANZapine  10 mg Intramuscular Q3H PRN Max 3/day Jane Carlin MD      OLANZapine  5 mg Oral Q3H PRN Max 6/day Jane Carlin MD      Or    OLANZapine  5 mg Intramuscular Q3H PRN Max 6/day Jane Carlin MD      OLANZapine  2 5 mg Oral Q3H PRN Max 8/day Jane Carlin MD      polyethylene glycol  17 g Oral Daily PRN Jane Carlin MD      prazosin  1 mg Oral HS Jane Carlin MD      propranolol  10 mg Oral Q8H PRN Jane Carlin MD      senna-docusate sodium  1 tablet Oral Daily PRN Jane Carlin MD      senna-docusate sodium  1 tablet Oral Daily Basil MD Katia         Behavioral Health Medications: all current active meds have been reviewed  Changes as in plan section above  Laboratory results:  I have personally reviewed all pertinent laboratory/tests results     Recent Results (from the past 48 hour(s))   Basic metabolic panel    Collection Time: 04/28/22  6:09 AM   Result Value Ref Range    Sodium 134 (L) 137 - 147 mmol/L    Potassium 4 1 3 6 - 5 0 mmol/L    Chloride 95 (L) 97 - 108 mmol/L    CO2 30 22 - 30 mmol/L    ANION GAP 9 5 - 14 mmol/L    BUN 15 5 - 25 mg/dL    Creatinine 0 57 (L) 0 60 - 1 20 mg/dL    Glucose 95 70 - 99 mg/dL    Glucose, Fasting 95 70 - 99 mg/dL    Calcium 9 1 8 4 - 10 2 mg/dL    eGFR 107 ml/min/1 73sq m   hCG, serum, qualitative    Collection Time: 04/28/22  6:09 AM   Result Value Ref Range    Preg, Serum Negative Negative   Lipid panel    Collection Time: 04/28/22  6:09 AM   Result Value Ref Range    Cholesterol 169 See Comment mg/dL    Triglycerides 180 (H) See Comment mg/dL    HDL, Direct 45 (L) >=50 mg/dL    LDL Calculated 88 <130 mg/dL    Non-HDL-Chol (CHOL-HDL) 124 mg/dl        This note has been constructed using a voice recognition system  There may be translation, syntax, or grammatical errors  If you have any questions, please contact the dictating author      Talia Rowe/[facility]'s Medical Student  MS-IV

## 2022-04-28 NOTE — NURSING NOTE
Pt visible on unit and social w/peers  Reports AH telling her to hurt herself but can contract for safety  Attending group and observed talking on phone repeating "I am very depressed, very depressed"  Med and meal compliant, has been cooperative with fluid restrictions  Denies any unmet needs or complaints at this time  Will monitor

## 2022-04-28 NOTE — PROGRESS NOTES
04/28/22 1000   Activity/Group Checklist   Group Other (Comment)  (Group Art Therapy/Psychodynamic, Open Choice)   Attendance Attended   Attendance Duration (min) Greater than 60   Interactions Interacted appropriately   Affect/Mood Appropriate   Goals Achieved Able to listen to others; Able to engage in interactions; Able to recieve feedback; Able to give feedback to another  (Able to engage materials; full participation)

## 2022-04-28 NOTE — PROGRESS NOTES
04/28/22 1025   Team Meeting   Meeting Type Tx Team Meeting   Initial Conference Date 04/28/22   Team Members Present   Team Members Present Physician;Nurse;   Physician Team Member Dr Starr Duval Team Member [address] Management Team Member Mamadou   Patient/Family Present   Patient Present Yes   Patient's Family Present No   Tx plan was reviewed and discussed with Pt  Pt was encouraged to attend groups  Medication was discussed with Pt  Pt signed tx plan

## 2022-04-28 NOTE — PROGRESS NOTES
Briefly met with patient to inform her that we will be working together and gave her reading material and coping skills to deal with voices  She was receptive  Will meet with her tomorrow

## 2022-04-28 NOTE — PLAN OF CARE
Problem: Alteration in Thoughts and Perception  Goal: Refrain from acting on delusional thinking/internal stimuli  Description: Interventions:  - Monitor patient closely, per order   - Utilize least restrictive measures   - Set reasonable limits, give positive feedback for acceptable   - Administer medications as ordered and monitor of potential side effects  Outcome: Progressing  Goal: Agree to be compliant with medication regime, as prescribed and report medication side effects  Description: Interventions:  - Offer appropriate PRN medication and supervise ingestion; conduct AIMS, as needed   Outcome: Progressing     Problem: Risk for Self Injury/Neglect  Goal: Verbalize thoughts and feelings  Description: Interventions:  - Assess and re-assess patient's lethality and potential for self-injury  - Engage patient in 1:1 interactions, daily, for a minimum of 15 minutes  - Encourage patient to express feelings, fears, frustrations, hopes  - Establish rapport/trust with patient   Outcome: Progressing  Goal: Refrain from harming self  Description: Interventions:  - Monitor patient closely, per order  - Develop a trusting relationship  - Supervise medication ingestion, monitor effects and side effects   Outcome: Progressing     Problem: Depression  Goal: Refrain from isolation  Description: Interventions:  - Develop a trusting relationship   - Encourage socialization   Outcome: Progressing     Problem: Alteration in Orientation  Goal: Express concerns related to confused thinking related to:  Description: Interventions:  - Encourage patient to express feelings, fears, frustrations, hopes  - Assign consistent caregivers   - [place]/re-orient patient as needed  - Allow comfort items, as appropriate  - Provide visual cues, signs, etc    Outcome: Progressing  Goal: Cooperate with recommended testing/procedures  Description: Interventions:  - Determine need for ancillary testing  - Observe for mental status changes  - Implement falls/precaution protocol   Outcome: Progressing     Problem: DISCHARGE PLANNING - CARE MANAGEMENT  Goal: Discharge to post-acute care or home with appropriate resources  Description: INTERVENTIONS:  - Conduct assessment to determine patient/family and health care team treatment goals, and need for post-acute services based on payer coverage, community resources, and patient preferences, and barriers to discharge  - Address psychosocial, clinical, and financial barriers to discharge as identified in assessment in conjunction with the patient/family and health care team  - Arrange appropriate level of post-acute services according to patients   needs and preference and payer coverage in collaboration with the physician and health care team  - Communicate with and update the patient/family, physician, and health care team regarding progress on the discharge plan  - Arrange appropriate transportation to post-acute venues  Outcome: Progressing

## 2022-04-28 NOTE — NURSING NOTE
Pt visible on unit, listening to music on tablet  Pt is withdrawn to self, but pleasant when approached  Pt is flat in affect with delayed responses  Pt endorses anxiety, depression and CAH "of my own voice" to harm self  Pt denies intent and contracts for safety  Pt is compliant with medications

## 2022-04-28 NOTE — PROGRESS NOTES
04/28/22 1024   Team Meeting   Meeting Type Daily Rounds   Team Members Present   Team Members Present Physician;Nurse;   Physician Team Member Dr Luis August Team Member [address] Management Team Member Mamadou   Patient/Family Present   Patient Present No   Patient's Family Present No   Pt remains medication compliant  Pt was started on prazosin, haldol and remeron  Pt continues to report CAH to harm herself  Discharge to be determined

## 2022-04-28 NOTE — NURSING NOTE
Pt approached this writer reporting some racing thoughts and CAH to hurt herself  PRN zyprexa 10mg po given at this time  Sitting in dayroom listening to music  Will monitor

## 2022-04-29 LAB
ANION GAP SERPL CALCULATED.3IONS-SCNC: 6 MMOL/L (ref 5–14)
BUN SERPL-MCNC: 15 MG/DL (ref 5–25)
CALCIUM SERPL-MCNC: 9.3 MG/DL (ref 8.4–10.2)
CHLORIDE SERPL-SCNC: 95 MMOL/L (ref 97–108)
CO2 SERPL-SCNC: 35 MMOL/L (ref 22–30)
CREAT SERPL-MCNC: 0.62 MG/DL (ref 0.6–1.2)
ERYTHROCYTE [DISTWIDTH] IN BLOOD BY AUTOMATED COUNT: 14 % (ref 11.6–15.1)
GFR SERPL CREATININE-BSD FRML MDRD: 104 ML/MIN/1.73SQ M
GLUCOSE P FAST SERPL-MCNC: 74 MG/DL (ref 70–99)
GLUCOSE SERPL-MCNC: 74 MG/DL (ref 70–99)
HCT VFR BLD AUTO: 42.1 % (ref 34.8–46.1)
HGB BLD-MCNC: 13.3 G/DL (ref 11.5–15.4)
MCH RBC QN AUTO: 31.9 PG (ref 26.8–34.3)
MCHC RBC AUTO-ENTMCNC: 31.6 G/DL (ref 31.4–37.4)
MCV RBC AUTO: 101 FL (ref 82–98)
PLATELET # BLD AUTO: 188 THOUSANDS/UL (ref 149–390)
PMV BLD AUTO: 9.3 FL (ref 8.9–12.7)
POTASSIUM SERPL-SCNC: 3.7 MMOL/L (ref 3.6–5)
RBC # BLD AUTO: 4.17 MILLION/UL (ref 3.81–5.12)
SODIUM SERPL-SCNC: 136 MMOL/L (ref 137–147)
WBC # BLD AUTO: 8.19 THOUSAND/UL (ref 4.31–10.16)

## 2022-04-29 PROCEDURE — 85027 COMPLETE CBC AUTOMATED: CPT

## 2022-04-29 PROCEDURE — 99232 SBSQ HOSP IP/OBS MODERATE 35: CPT | Performed by: PSYCHIATRY & NEUROLOGY

## 2022-04-29 PROCEDURE — 80048 BASIC METABOLIC PNL TOTAL CA: CPT

## 2022-04-29 RX ORDER — HALOPERIDOL 10 MG/1
10 TABLET ORAL
Status: DISCONTINUED | OUTPATIENT
Start: 2022-04-29 | End: 2022-05-06

## 2022-04-29 RX ORDER — ONDANSETRON 4 MG/1
4 TABLET, ORALLY DISINTEGRATING ORAL EVERY 6 HOURS PRN
Status: DISCONTINUED | OUTPATIENT
Start: 2022-04-29 | End: 2022-05-09 | Stop reason: HOSPADM

## 2022-04-29 RX ADMIN — Medication 1000 UNITS: at 08:36

## 2022-04-29 RX ADMIN — ONDANSETRON 4 MG: 4 TABLET, ORALLY DISINTEGRATING ORAL at 20:41

## 2022-04-29 RX ADMIN — LISINOPRIL 20 MG: 10 TABLET ORAL at 08:36

## 2022-04-29 RX ADMIN — SENNOSIDES AND DOCUSATE SODIUM 1 TABLET: 50; 8.6 TABLET ORAL at 17:24

## 2022-04-29 RX ADMIN — SENNOSIDES AND DOCUSATE SODIUM 1 TABLET: 50; 8.6 TABLET ORAL at 08:36

## 2022-04-29 RX ADMIN — ASPIRIN 81 MG CHEWABLE TABLET 81 MG: 81 TABLET CHEWABLE at 08:36

## 2022-04-29 RX ADMIN — LEVOCARNITINE 330 MG: 1 SOLUTION ORAL at 08:40

## 2022-04-29 RX ADMIN — ATORVASTATIN CALCIUM 40 MG: 40 TABLET, FILM COATED ORAL at 17:17

## 2022-04-29 NOTE — PROGRESS NOTES
BELL Group Note     04/29/22 1415   Activity/Group Checklist   Group Life Skills  (Teamwork and Communication)   Attendance Attended   Attendance Duration (min) 46-60   Interactions Interacted appropriately  (verbally scant)   Affect/Mood Appropriate; Constricted   Goals Achieved Identified feelings; Identified triggers; Discussed coping strategies; Able to listen to others; Able to engage in interactions; Able to reflect/comment on own behavior;Able to manage/cope with feelings; Able to self-disclose; Able to recieve feedback; Able to give feedback to another

## 2022-04-29 NOTE — PLAN OF CARE
Problem: Alteration in Thoughts and Perception  Goal: Refrain from acting on delusional thinking/internal stimuli  Description: Interventions:  - Monitor patient closely, per order   - Utilize least restrictive measures   - Set reasonable limits, give positive feedback for acceptable   - Administer medications as ordered and monitor of potential side effects  Outcome: Progressing  Goal: Agree to be compliant with medication regime, as prescribed and report medication side effects  Description: Interventions:  - Offer appropriate PRN medication and supervise ingestion; conduct AIMS, as needed   Outcome: Progressing     Problem: Ineffective Coping  Goal: Participates in unit activities  Description: Interventions:  - Provide therapeutic environment   - Provide required programming   - Redirect inappropriate behaviors   Outcome: Progressing     Problem: Risk for Self Injury/Neglect  Goal: Verbalize thoughts and feelings  Description: Interventions:  - Assess and re-assess patient's lethality and potential for self-injury  - Engage patient in 1:1 interactions, daily, for a minimum of 15 minutes  - Encourage patient to express feelings, fears, frustrations, hopes  - Establish rapport/trust with patient   Outcome: Progressing  Goal: Refrain from harming self  Description: Interventions:  - Monitor patient closely, per order  - Develop a trusting relationship  - Supervise medication ingestion, monitor effects and side effects   Outcome: Progressing     Problem: Depression  Goal: Refrain from isolation  Description: Interventions:  - Develop a trusting relationship   - Encourage socialization   Outcome: Progressing     Problem: Alteration in Orientation  Goal: Express concerns related to confused thinking related to:  Description: Interventions:  - Encourage patient to express feelings, fears, frustrations, hopes  - Assign consistent caregivers   - [place]/re-orient patient as needed  - Allow comfort items, as appropriate  - Provide visual cues, signs, etc    Outcome: Progressing  Goal: Cooperate with recommended testing/procedures  Description: Interventions:  - Determine need for ancillary testing  - Observe for mental status changes  - Implement falls/precaution protocol   Outcome: Progressing     Problem: DISCHARGE PLANNING - CARE MANAGEMENT  Goal: Discharge to post-acute care or home with appropriate resources  Description: INTERVENTIONS:  - Conduct assessment to determine patient/family and health care team treatment goals, and need for post-acute services based on payer coverage, community resources, and patient preferences, and barriers to discharge  - Address psychosocial, clinical, and financial barriers to discharge as identified in assessment in conjunction with the patient/family and health care team  - Arrange appropriate level of post-acute services according to patients   needs and preference and payer coverage in collaboration with the physician and health care team  - Communicate with and update the patient/family, physician, and health care team regarding progress on the discharge plan  - Arrange appropriate transportation to post-acute venues  Outcome: Progressing

## 2022-04-29 NOTE — PROGRESS NOTES
Progress Note - Behavioral Health   Leydi Lofton 47 y o  female MRN: [id number]  Unit/Bed#: U 344-02 Encounter: [id number]    Assessment/Plan   Principal Problem:    Major depression with psychotic features ([facility] )  Active Problems:    Primary hypertension    Hyperlipidemia    Class 3 severe obesity due to excess calories without serious comorbidity in [facility])    Medical clearance for psychiatric admission    Seizures ([facility] )    Hyponatremia      Recommended Treatment:     1  Continue with medications:  a  Continue Depakote 200mg ER for seizures  Will check serum levels 4/30/22  b  Increase Haldol to 10mg HS for psychosis  c  Continue Remeron 15mg for mood/insomnia  d  Continue Minipress 1mg HS for nightmares  2  Continue attending group therapy, milieu therapy and occupational therapy  3  Continue safety checks/vitals per unit protocol  4  Continue with as needed [facility] medical management   5  Continue coordinating with case management regarding disposition    Case discussed with treatment team   Risks, benefits and possible side effects of Medications: Risks, benefits, and possible side effects of medications have been explained to the patient, who verbalizes understanding    Legal Status: 201  Diet: Regular    ------------------------------------------------------------    Subjective: Per nursing report, Leydi has been pleasant and cooperative on the unit and compliant with medications  She approached one of the nursing staff last night to inform them that she was experiencing CAH to harm herself  She was given PRN zyprexa, and it appeared to help  In the evening, she had a large bowel movement with fresh blood in the toilet  She denied a history of hemorrhoids  [facility] was contacted and ordered a CBC, which was WNL  On evaluation, Leydi is slightly more talkative and spontaneous  She was attending one of the art groups prior to our interview, and stated she finds them one of the more helpful groups   She expressed her mood is "depressed," though has been having less crying spells  She is still having CAH of her own voice to harm herself through cutting her wrists and slitting her throat, but is able to contract for safety on the unit  She also still expresses thoughts that her boyfriend does not love her anymore  She said she spoke with her boyfriend earlier today, which patients stated helped "pick me up " She said she slept poorly again last night, having difficulty falling asleep, though she denied nightmares  Her energy level today is low  Her appetite has been good  She denies any adverse effects from medications  Her goals for today are to continue to attend groups  Today, Leydi is layo for safety on the unit  She expresses suicidal ideation, though denies homicidal ideation  She is experiencing auditory hallucinations, and denies visual hallucinations  She denies paranoid thoughts or ideation  PRNs overnight:   - Zyprexa 10mg 4/28/22 @ 1210  - Miralax 17g 4/28/22 @ 2051    VS: Reviewed, within normal limits    Progress Toward Goals: Slow improvement    Psychiatric Review of Systems:  Behavior over the last 24 hours: improving  Sleep: insomnia  Appetite: adequate  Medication side effects: none verbalized  Medical ROS: Complete review of systems is negative except as noted above      Vital signs in last 24 hours:  Temp:  [97 5 °F (36 4 °C)-97 6 °F (36 4 °C)] 97 6 °F (36 4 °C)  HR:  [] 127  Resp:  [16] 16  BP: (104-163)/(57-70) 163/70    Mental Status Exam:  Appearance:  alert, good but at times intense eye contact (bordering on staring blankly), appears stated age, casually dressed, appropriate grooming and hygiene, obese and overtly appearing  female   Behavior:  calm, cooperative and sitting comfortably   Motor: no abnormal movements, normal gait and balance and no psychomotor agitation/slowing   Speech:  spontaneous, clear, normal rate, normal volume and coherent, quick to answer, scant at times   Mood:  "depressed"   Affect:  less blunted, more reactive   Thought Process:  logical, linear, concrete, perseverative at times   Thought Content: no verbalized delusions or overt paranoia, negative thoughts   Perceptual disturbances: does not appear to be responding to internal stimuli at this time and command auditory hallucinations to cut her wrists and throat, no visual hallucinations, continues to have paranoia vs  Intrusive thoughts that her boy friend no longer loves her   Risk Potential: active suicidal ideation with plan to cut wrists or neck, no homicidal ideation  Can contract for safetly on the unit     Cognition: cognition not formally tested   Insight:  Limited   Judgment: Limited     Current Medications:  Current Facility-Administered Medications   Medication Dose Route Frequency Provider Last Rate    acetaminophen  650 mg Oral Q6H PRN Neida Du MD      acetaminophen  650 mg Oral Q4H PRN Neida Du MD      acetaminophen  975 mg Oral Q6H PRN Neida Du MD      aluminum-magnesium hydroxide-simethicone  30 mL Oral Q4H PRN Neida Du MD      artificial tear  1 application Both Eyes N2U PRN Neida Du MD      aspirin  81 mg Oral Daily Neida Du MD      atorvastatin  40 mg Oral Daily With Hi Alcala MD      benztropine  1 mg Intramuscular BID PRN Neida Du MD      benztropine  1 mg Oral BID PRN Neida Du MD      cholecalciferol  1,000 Units Oral Daily Neida Du MD      hydrOXYzine HCL  50 mg Oral Q6H PRN Max 4/day Neida Du MD      Or    diphenhydrAMINE  50 mg Intramuscular Q6H PRN Neida Du MD      divalproex sodium  2,000 mg Oral HS Neida Du MD      haloperidol  10 mg Oral HS Kel Sutton MD      hydrOXYzine HCL  100 mg Oral Q6H PRN Max 4/day Neida Du MD      Or    LORazepam  2 mg Intramuscular Q6H PRN Pricilla Snow MD      hydrOXYzine HCL  25 mg Oral Q6H PRN Max 4/day Pricilla Snow MD      ibuprofen  600 mg Oral Q6H PRN yKra Machado PA-C      levOCARNitine  330 mg/day Oral QAM Pricilla Snow MD      lisinopril  20 mg Oral Daily Pricilla Snow MD      melatonin  3 mg Oral HS PRN Pricilla Snow MD      mirtazapine  15 mg Oral HS Pricilla Snow MD      OLANZapine  10 mg Oral Q3H PRN Max 3/day Pricilla Snow MD      Or    OLANZapine  10 mg Intramuscular Q3H PRN Max 3/day Pricilla Snow MD      OLANZapine  5 mg Oral Q3H PRN Max 6/day Pricilla Snow MD      Or    OLANZapine  5 mg Intramuscular Q3H PRN Max 6/day Pricilla Snow MD      OLANZapine  2 5 mg Oral Q3H PRN Max 8/day Pricilla Snow MD      polyethylene glycol  17 g Oral Daily PRN Pricilla Snow MD      prazosin  1 mg Oral HS Pricilla Snow MD      propranolol  10 mg Oral Q8H PRN Pricilla Snow MD      senna-docusate sodium  1 tablet Oral BID Az Gardiner Medications: all current active meds have been reviewed  Changes as in plan section above  Laboratory results:  I have personally reviewed all pertinent laboratory/tests results     Recent Results (from the past 48 hour(s))   Basic metabolic panel    Collection Time: 04/28/22  6:09 AM   Result Value Ref Range    Sodium 134 (L) 137 - 147 mmol/L    Potassium 4 1 3 6 - 5 0 mmol/L    Chloride 95 (L) 97 - 108 mmol/L    CO2 30 22 - 30 mmol/L    ANION GAP 9 5 - 14 mmol/L    BUN 15 5 - 25 mg/dL    Creatinine 0 57 (L) 0 60 - 1 20 mg/dL    Glucose 95 70 - 99 mg/dL    Glucose, Fasting 95 70 - 99 mg/dL    Calcium 9 1 8 4 - 10 2 mg/dL    eGFR 107 ml/min/1 73sq m   hCG, serum, qualitative    Collection Time: 04/28/22  6:09 AM   Result Value Ref Range    Preg, Serum Negative Negative   Lipid panel    Collection Time: 04/28/22  6:09 AM   Result Value Ref Range Cholesterol 169 See Comment mg/dL    Triglycerides 180 (H) See Comment mg/dL    HDL, Direct 45 (L) >=50 mg/dL    LDL Calculated 88 <130 mg/dL    Non-HDL-Chol (CHOL-HDL) 124 mg/dl   ECG 12 lead    Collection Time: 04/28/22  4:08 PM   Result Value Ref Range    Ventricular Rate 83 BPM    Atrial Rate 83 BPM    AK Interval 178 ms    QRSD Interval 86 ms    QT Interval 376 ms    QTC Interval 441 ms    P Axis 62 degrees    QRS Axis 39 degrees    T Wave Axis 75 degrees   Basic metabolic panel    Collection Time: 04/29/22  8:51 AM   Result Value Ref Range    Sodium 136 (L) 137 - 147 mmol/L    Potassium 3 7 3 6 - 5 0 mmol/L    Chloride 95 (L) 97 - 108 mmol/L    CO2 35 (H) 22 - 30 mmol/L    ANION GAP 6 5 - 14 mmol/L    BUN 15 5 - 25 mg/dL    Creatinine 0 62 0 60 - 1 20 mg/dL    Glucose 74 70 - 99 mg/dL    Glucose, Fasting 74 70 - 99 mg/dL    Calcium 9 3 8 4 - 10 2 mg/dL    eGFR 104 ml/min/1 73sq m   CBC and Platelet    Collection Time: 04/29/22  8:51 AM   Result Value Ref Range    WBC 8 19 4 31 - 10 16 Thousand/uL    RBC 4 17 3 81 - 5 12 Million/uL    Hemoglobin 13 3 11 5 - 15 4 g/dL    Hematocrit 42 1 34 8 - 46 1 %     (H) 82 - 98 fL    MCH 31 9 26 8 - 34 3 pg    MCHC 31 6 31 4 - 37 4 g/dL    RDW 14 0 11 6 - 15 1 %    Platelets 765 304 - 558 Thousands/uL    MPV 9 3 8 9 - 12 7 fL        This note has been constructed using a voice recognition system  There may be translation, syntax, or grammatical errors  If you have any questions, please contact the dictating author      Sapna Rowe/[place] Velez   MS-IV

## 2022-04-29 NOTE — PROGRESS NOTES
04/29/22 1000   Activity/Group Checklist   Group Other (Comment)  (OPEN STUDIO Art Therapy/Social Group, [employer])   Attendance Attended   Attendance Duration (min) Greater than 60   Interactions Interacted appropriately   Affect/Mood Appropriate   Goals Achieved Able to listen to others; Able to engage in interactions

## 2022-04-29 NOTE — PROGRESS NOTES
[facility] Group Note     04/29/22 0930   Activity/Group Checklist   Group Community meeting   Attendance Attended   Attendance Duration (min) 0-15   Interactions Interacted appropriately   Affect/Mood Appropriate;Calm   Goals Achieved Identified feelings; Discussed coping strategies; Able to listen to others; Able to engage in interactions; Able to reflect/comment on own behavior;Able to self-disclose; Able to recieve feedback; Able to give feedback to another

## 2022-04-29 NOTE — NURSING NOTE
PTA Meds needs completion  This writer attempted to call pharmacy, but they closed at [facility] opens at 9am-1pm on Saturday  Phone number is

## 2022-04-29 NOTE — PROGRESS NOTES
04/29/22 0951   Team Meeting   Meeting Type Daily Rounds   Team Members Present   Team Members Present Physician;Nurse;   Physician Team Member Dr Meme Lees Team Member [id number] Management Team Member Mamadou   Patient/Family Present   Patient Present No   Patient's Family Present No   Pt is medication compliant  Pt reports AH and anxiety  Discharge to be determined

## 2022-04-29 NOTE — NURSING NOTE
Following large stool with fresh blood, [facility] contacted via TT by Ana Carr  Pt refused topical cream, requested laxatives instead  PRN miralax given  CBC ordered for morning to rule out GIB

## 2022-04-29 NOTE — NURSING NOTE
Patient has been out on the unit utilizing a tablet  Patient has been pleasant and cooperative  Patient c/o feeling tired from the medications  She also stated she has CAH to hurt herself but is able to contract for safety  Patient is med/meal compliant

## 2022-04-29 NOTE — NURSING NOTE
Pt is visible, social and pleasant with peers and staff  Pt does endorse CAH to harm self, but denies intent and contracts for safety  Pt appears less delayed in responses this evening compared to last  Pt cooperative and compliant with treatment and medications  Pt compliant with alerting writer of blood in underwear  Will monitor

## 2022-04-29 NOTE — PLAN OF CARE
[facility] Group Note  Pt attended all groups in the past 24 hours    Problem: Ineffective Coping  Goal: Participates in unit activities  Description: Interventions:  - Provide therapeutic environment   - Provide required programming   - Redirect inappropriate behaviors   Outcome: Progressing

## 2022-04-29 NOTE — NURSING NOTE
Patient had a large stool which she said was difficult to pass  She had fresh blood in the toilet with the stool but she does not seem to know if she has hemorrhoids  She certainly has not had any treatment with hemorrhoid cream - when this is described to her she does not have any familiarity with hemorrhoid treatment

## 2022-04-29 NOTE — PROGRESS NOTES
04/29/22 1300   Activity/Group Checklist   Group   (recovery group)   Attendance Attended   Attendance Duration (min) 46-60   Interactions Interacted appropriately   Affect/Mood Appropriate   Goals Achieved Discussed self-esteem issues; Able to listen to others; Able to engage in interactions; Able to reflect/comment on own behavior;Able to self-disclose; Able to recieve feedback

## 2022-04-29 NOTE — PROGRESS NOTES
Met with Leydi and when asked how she is doing she said she is having suicidal ideations but will contract for safety  When attempting to ask about her depression she said she is a 10 being the worst before this writer could ask about depression  She states she came into the hospital due to "feelings" her boyfriend does not love her  She still has these feelings even after talking with him and he proclaimed his love for her  She knows she has negative thinking which leads to her suicidal thoughts and self harm  She also noted she has voices telling her to do things  Patient was not able to tell this writer what she read about voices and coping skills; we will review it together next week  Patient del cid not seem motivated to change her thinking patterns we discussed the stop technique which shew as aware  Requested that patient jot down her thought three times during the day and we will review them and practice reframing when we meet during the next session  Therapist will encourage patient to attend group therapy

## 2022-04-30 PROCEDURE — 99232 SBSQ HOSP IP/OBS MODERATE 35: CPT | Performed by: PSYCHIATRY & NEUROLOGY

## 2022-04-30 RX ORDER — LOPERAMIDE HYDROCHLORIDE 2 MG/1
2 CAPSULE ORAL 3 TIMES DAILY PRN
Status: DISCONTINUED | OUTPATIENT
Start: 2022-04-30 | End: 2022-05-09 | Stop reason: HOSPADM

## 2022-04-30 RX ADMIN — ATORVASTATIN CALCIUM 40 MG: 40 TABLET, FILM COATED ORAL at 17:06

## 2022-04-30 RX ADMIN — Medication 1000 UNITS: at 08:24

## 2022-04-30 RX ADMIN — LISINOPRIL 20 MG: 10 TABLET ORAL at 08:24

## 2022-04-30 RX ADMIN — SENNOSIDES AND DOCUSATE SODIUM 1 TABLET: 50; 8.6 TABLET ORAL at 08:24

## 2022-04-30 RX ADMIN — ASPIRIN 81 MG CHEWABLE TABLET 81 MG: 81 TABLET CHEWABLE at 08:24

## 2022-04-30 RX ADMIN — LOPERAMIDE HYDROCHLORIDE 2 MG: 2 CAPSULE ORAL at 09:06

## 2022-04-30 RX ADMIN — LOPERAMIDE HYDROCHLORIDE 2 MG: 2 CAPSULE ORAL at 17:17

## 2022-04-30 RX ADMIN — LEVOCARNITINE 330 MG: 1 SOLUTION ORAL at 08:25

## 2022-04-30 NOTE — NURSING NOTE
Patient was isolative to the room  Patient c/o continued nausea, but no further vomiting  The zofran was effective  Patient refused HS medications stating, "I'm too nauseas " Patient denies SI/AVH or thoughts of self harm this shift  Staff to maintain continuous rounding for safety and support

## 2022-04-30 NOTE — NURSING NOTE
Due to c/o diarrhea and nausea, pt is currently on contact precautions  Pt spent the majority of the day resting in bedroom and self-isolating from peers due to feeling sick  Pt remained calm and cooperative  Upon approach pt was polite, pleasant, and preoccupied at times  Pt c/o CAH to hurt self, but pt contracted for safety and said, "they (voices) are not as loud today "  Pt denies SI/HI/VH  Pt was compliant with medications and meals  Will continue to monitor

## 2022-04-30 NOTE — NURSING NOTE
Pt c/o diarrhea around 0900  Doctor on call notified and PRN Imodium 2 mg PO ordered  Pt provided PRN  Pt states feeling nauseous last night with one episode of vomiting, but states, "feeling better today with no nausea "  Will continue to monitor

## 2022-04-30 NOTE — NURSING NOTE
Patient reported she has been feeling nauseas all day and just vomited  PRN zofran was ordered  Will continue to monitor

## 2022-04-30 NOTE — PLAN OF CARE
Problem: Alteration in Thoughts and Perception  Goal: Refrain from acting on delusional thinking/internal stimuli  Description: Interventions:  - Monitor patient closely, per order   - Utilize least restrictive measures   - Set reasonable limits, give positive feedback for acceptable   - Administer medications as ordered and monitor of potential side effects  Outcome: Progressing  Goal: Agree to be compliant with medication regime, as prescribed and report medication side effects  Description: Interventions:  - Offer appropriate PRN medication and supervise ingestion; conduct AIMS, as needed   Outcome: Progressing     Problem: Risk for Self Injury/Neglect  Goal: Verbalize thoughts and feelings  Description: Interventions:  - Assess and re-assess patient's lethality and potential for self-injury  - Engage patient in 1:1 interactions, daily, for a minimum of 15 minutes  - Encourage patient to express feelings, fears, frustrations, hopes  - Establish rapport/trust with patient   Outcome: Progressing  Goal: Refrain from harming self  Description: Interventions:  - Monitor patient closely, per order  - Develop a trusting relationship  - Supervise medication ingestion, monitor effects and side effects   Outcome: Progressing     Problem: Depression  Goal: Refrain from isolation  Description: Interventions:  - Develop a trusting relationship   - Encourage socialization   Outcome: Progressing     Problem: Alteration in Orientation  Goal: Express concerns related to confused thinking related to:  Description: Interventions:  - Encourage patient to express feelings, fears, frustrations, hopes  - Assign consistent caregivers   - Claridge/re-orient patient as needed  - Allow comfort items, as appropriate  - Provide visual cues, signs, etc    Outcome: Progressing  Goal: Cooperate with recommended testing/procedures  Description: Interventions:  - Determine need for ancillary testing  - Observe for mental status changes  - Implement falls/precaution protocol   Outcome: Progressing

## 2022-04-30 NOTE — PROGRESS NOTES
Progress Note - Behavioral Health   Leydi Yvon Torres 47 y o  female MRN: [id number]  Unit/Bed#: -02 Encounter: [id number]    Assessment/Plan   Principal Problem:    Major depression with psychotic features ([facility] 75 )  Active Problems:    Primary hypertension    Hyperlipidemia    Class 3 severe obesity due to excess calories without serious comorbidity in [facility])    Medical clearance for psychiatric admission    Seizures ([facility] 75 )    Hyponatremia     Continue current medication regimen:  o Depakote ER 2000 mg at bedtime for mood stabilization  o Haldol 10 mg at bedtime for mood stabilization and psychosis  o Remeron 15 mg q h s  For sleep  o Minipress 1 mg q h s  For nightmares   Continue to promote patient participation in group therapy, milieu therapy, occupational therapy   Continue medical management by SLIM   Discharge disposition:  pending  Subjective: The patient was evaluated this morning for continuity of care and no acute distress noted throughout the evaluation  Over the past 24 hours staff noted the patient was attending groups yesterday  Patient was feeling nauseous all day and had an episode of vomiting  Zofran was given  At nighttime, patient was isolated to room, continued of continuous nausea  Refuse HS medications due to nausea  Patient had diarrhea at 9:00 a m , and Imodium was given  Today on evaluation, the patient states that she feels tired as she did not get enough sleep at night due to 2 episodes of diarrhea she experienced  She states that she slept 4-5 hours overnight  She states that she has decreased appetite and decreased energy levels today  She reports that she is depressed, and experiencing command auditory hallucinations with voices telling her to kill herself    She herself things that she has passive suicidal thoughts, thoughts of wanting to slit her wrists, but states that she has not done anything harmful on the unit and will not attempt anything while she is in the hospital   She states that she does not agree with these thoughts  Denies visual hallucinations  Denies homicidal ideation      Psychiatric Review of Systems:  Behavior over the last 24 hours:  unchanged  Sleep: decreased  Appetite: decreased  Medication side effects: No   ROS: reports tiredness, diarrhea ; all other systems are negative    Current Medications:  Current Facility-Administered Medications   Medication Dose Route Frequency Provider Last Rate    acetaminophen  650 mg Oral Q6H PRN Sohail Ferrell MD      acetaminophen  650 mg Oral Q4H PRN Sohail Ferrell MD      acetaminophen  975 mg Oral Q6H PRN Sohail Ferrell MD      aluminum-magnesium hydroxide-simethicone  30 mL Oral Q4H PRN Sohail Ferrell MD      artificial tear  1 application Both Eyes M7E PRN Sohail Ferrell MD      aspirin  81 mg Oral Daily Sohail Ferrell MD      atorvastatin  40 mg Oral Daily With Claudio Coughlin MD      benztropine  1 mg Intramuscular BID PRN Sohail Ferrell MD      benztropine  1 mg Oral BID PRN Sohail Ferrell MD      cholecalciferol  1,000 Units Oral Daily Sohail Ferrell MD      hydrOXYzine HCL  50 mg Oral Q6H PRN Max 4/day Sohail Ferrell MD      Or    diphenhydrAMINE  50 mg Intramuscular Q6H PRN Sohail Ferrell MD      divalproex sodium  2,000 mg Oral HS Sohail Ferrell MD      haloperidol  10 mg Oral HS Chiqui Finn MD      hydrOXYzine HCL  100 mg Oral Q6H PRN Max 4/day Sohail Ferrell MD      Or    LORazepam  2 mg Intramuscular Q6H PRN Sohail Ferrell MD      hydrOXYzine HCL  25 mg Oral Q6H PRN Max 4/day Sohail Ferrell MD      ibuprofen  600 mg Oral Q6H PRN Renetta Hadley PA-C      levOCARNitine  330 mg/day Oral QAM Sohail Ferrell MD      lisinopril  20 mg Oral Daily Sohail Ferrell MD      loperamide  2 mg Oral TID PRN Lisa Courtney MD      melatonin  3 mg Oral HS PRN Jane Carlin MD      mirtazapine  15 mg Oral HS Jane Carlin MD      OLANZapine  10 mg Oral Q3H PRN Max 3/day Jane Carlin MD      Or    OLANZapine  10 mg Intramuscular Q3H PRN Max 3/day Jane Carlin MD      OLANZapine  5 mg Oral Q3H PRN Max 6/day Jane Carlin MD      Or    OLANZapine  5 mg Intramuscular Q3H PRN Max 6/day Jane Carlin MD      OLANZapine  2 5 mg Oral Q3H PRN Max 8/day Jane Carlin MD      ondansetron  4 mg Oral Q6H PRN Leydi Warren PA-C      polyethylene glycol  17 g Oral Daily PRN Jane Carlin MD      prazosin  1 mg Oral HS Jane Carlin MD      propranolol  10 mg Oral Q8H PRN Jane Carlin MD      senna-docusate sodium  1 tablet Oral BID SHANI Driver         Behavioral Health Medications:   all current active meds have been reviewed, continue current psychiatric medications and current meds:   Current Facility-Administered Medications   Medication Dose Route Frequency    acetaminophen (TYLENOL) tablet 650 mg  650 mg Oral Q6H PRN    acetaminophen (TYLENOL) tablet 650 mg  650 mg Oral Q4H PRN    acetaminophen (TYLENOL) tablet 975 mg  975 mg Oral Q6H PRN    aluminum-magnesium hydroxide-simethicone (MYLANTA) oral suspension 30 mL  30 mL Oral Q4H PRN    artificial tear (LUBRIFRESH P M ) ophthalmic ointment 1 application  1 application Both Eyes Q3A PRN    aspirin chewable tablet 81 mg  81 mg Oral Daily    atorvastatin (LIPITOR) tablet 40 mg  40 mg Oral Daily With Dinner    benztropine (COGENTIN) injection 1 mg  1 mg Intramuscular BID PRN    benztropine (COGENTIN) tablet 1 mg  1 mg Oral BID PRN    cholecalciferol (VITAMIN D3) tablet 1,000 Units  1,000 Units Oral Daily    hydrOXYzine HCL (ATARAX) tablet 50 mg  50 mg Oral Q6H PRN Max 4/day    Or    diphenhydrAMINE (BENADRYL) injection 50 mg  50 mg Intramuscular Q6H PRN    divalproex sodium (DEPAKOTE ER) 24 hr tablet 2,000 mg  2,000 mg Oral HS    haloperidol (HALDOL) tablet 10 mg  10 mg Oral HS    hydrOXYzine HCL (ATARAX) tablet 100 mg  100 mg Oral Q6H PRN Max 4/day    Or    LORazepam (ATIVAN) injection 2 mg  2 mg Intramuscular Q6H PRN    hydrOXYzine HCL (ATARAX) tablet 25 mg  25 mg Oral Q6H PRN Max 4/day    ibuprofen (MOTRIN) tablet 600 mg  600 mg Oral Q6H PRN    levOCARNitine (CARNITOR) oral solution 330 mg  330 mg/day Oral QAM    lisinopril (ZESTRIL) tablet 20 mg  20 mg Oral Daily    loperamide (IMODIUM) capsule 2 mg  2 mg Oral TID PRN    melatonin tablet 3 mg  3 mg Oral HS PRN    mirtazapine (REMERON) tablet 15 mg  15 mg Oral HS    OLANZapine (ZyPREXA) tablet 10 mg  10 mg Oral Q3H PRN Max 3/day    Or    OLANZapine (ZyPREXA) IM injection 10 mg  10 mg Intramuscular Q3H PRN Max 3/day    OLANZapine (ZyPREXA) tablet 5 mg  5 mg Oral Q3H PRN Max 6/day    Or    OLANZapine (ZyPREXA) IM injection 5 mg  5 mg Intramuscular Q3H PRN Max 6/day    OLANZapine (ZyPREXA) tablet 2 5 mg  2 5 mg Oral Q3H PRN Max 8/day    ondansetron (ZOFRAN-ODT) dispersible tablet 4 mg  4 mg Oral Q6H PRN    polyethylene glycol (MIRALAX) packet 17 g  17 g Oral Daily PRN    prazosin (MINIPRESS) capsule 1 mg  1 mg Oral HS    propranolol (INDERAL) tablet 10 mg  10 mg Oral Q8H PRN    senna-docusate sodium (SENOKOT S) 8 6-50 mg per tablet 1 tablet  1 tablet Oral BID     Vital signs in last 24 hours:  Temp:  [97 6 °F (36 4 °C)-97 7 °F (36 5 °C)] 97 6 °F (36 4 °C)  HR:  [103-105] 105  Resp:  [16] 16  BP: (117-131)/(34-80) 117/80    Laboratory results:    I have personally reviewed all pertinent laboratory/tests results    Most Recent Labs:   Lab Results   Component Value Date    WBC 8 19 04/29/2022    RBC 4 17 04/29/2022    HGB 13 3 04/29/2022    HCT 42 1 04/29/2022     04/29/2022    RDW 14 0 04/29/2022    NEUTROABS 7 02 04/25/2022    SODIUM 136 (L) 04/29/2022    K 3 7 04/29/2022    CL 95 (L) 04/29/2022    CO2 35 (H) 04/29/2022    BUN 15 04/29/2022    CREATININE 0 62 04/29/2022    GLUC 74 04/29/2022    GLUF 74 04/29/2022    CALCIUM 9 3 04/29/2022    AST 29 04/26/2022    ALT 17 04/26/2022    ALKPHOS 64 04/26/2022    TP 6 9 04/26/2022    ALB 3 0 (L) 04/26/2022    TBILI 0 72 04/26/2022    CHOLESTEROL 169 04/28/2022    HDL 45 (L) 04/28/2022    TRIG 180 (H) 04/28/2022    LDLCALC 88 04/28/2022    NONHDLC 124 04/28/2022    VALPROICTOT 76 04/25/2022    [id number] 2 680 04/25/2022    PREGUR negative 04/10/2022    PREGSERUM Negative 04/28/2022    RPR Non-Reactive 02/13/2022    HGBA1C 4 8 12/31/2021    EAG 91 12/31/2021     Mental Status Evaluation:    Appearance:  age appropriate  female, increased BMI, sitting in day room  Casually dressed  Not in acute distress  Behavior:  pleasant, cooperative, calm   Speech:  normal rate and volume   Mood:  Depressed    Affect:  constricted   Thought Process:  goal directed   Associations: intact associations   Thought Content:  normal, no overt delusions   Perceptual Disturbances: Command auditory hallucinations of voices telling her to hurt herself  No visual hallucinations  Risk Potential: Suicidal ideation - Yes, fleeting suicidal thoughts   Contracts for safety on the unit  Homicidal ideation - None  Potential for aggression - No   Sensorium:  oriented to person, place and time/date   Memory:  recent and remote memory grossly intact   Consciousness:  alert and awake   Attention/Concentration: attention span and concentration are age appropriate   Insight:  limited   Judgment: limited   Gait/Station: normal gait/station   Motor Activity: no abnormal movements     Progress Toward Goals:  Progressing  Recommended Treatment:   See above for assessment and plan  Risks, benefits and possible side effects of Medications:   Risks, benefits, and possible side effects of medications explained to patient and patient verbalizes understanding        This note has been constructed using a voice recognition system  There may be translation, syntax, or grammatical errors  If you have any questions, please contact the dictating provider      Radha Bansal MD  Psychiatry Resident PGY-2

## 2022-04-30 NOTE — NURSING NOTE
Pt refused scheduled Senokot  Pt continues to c/o diarrhea  Pt provided PRN Imodium 2 mg PO  Will continue to monitor

## 2022-05-01 LAB — VALPROATE SERPL-MCNC: <10 UG/ML (ref 50–120)

## 2022-05-01 PROCEDURE — 80164 ASSAY DIPROPYLACETIC ACD TOT: CPT

## 2022-05-01 PROCEDURE — 99232 SBSQ HOSP IP/OBS MODERATE 35: CPT | Performed by: PSYCHIATRY & NEUROLOGY

## 2022-05-01 RX ADMIN — Medication 1000 UNITS: at 08:06

## 2022-05-01 RX ADMIN — ATORVASTATIN CALCIUM 40 MG: 40 TABLET, FILM COATED ORAL at 17:10

## 2022-05-01 RX ADMIN — PRAZOSIN HYDROCHLORIDE 1 MG: 1 CAPSULE ORAL at 21:11

## 2022-05-01 RX ADMIN — MIRTAZAPINE 22.5 MG: 7.5 TABLET, FILM COATED ORAL at 21:11

## 2022-05-01 RX ADMIN — ASPIRIN 81 MG CHEWABLE TABLET 81 MG: 81 TABLET CHEWABLE at 08:07

## 2022-05-01 RX ADMIN — LEVOCARNITINE 330 MG: 1 SOLUTION ORAL at 08:07

## 2022-05-01 RX ADMIN — HALOPERIDOL 10 MG: 10 TABLET ORAL at 21:11

## 2022-05-01 RX ADMIN — MELATONIN TAB 3 MG 3 MG: 3 TAB at 21:11

## 2022-05-01 RX ADMIN — DIVALPROEX SODIUM 2000 MG: 500 TABLET, EXTENDED RELEASE ORAL at 21:11

## 2022-05-01 RX ADMIN — LISINOPRIL 20 MG: 10 TABLET ORAL at 08:06

## 2022-05-01 RX ADMIN — OLANZAPINE 10 MG: 10 TABLET, FILM COATED ORAL at 11:51

## 2022-05-01 NOTE — PLAN OF CARE
Problem: Alteration in Thoughts and Perception  Goal: Refrain from acting on delusional thinking/internal stimuli  Description: Interventions:  - Monitor patient closely, per order   - Utilize least restrictive measures   - Set reasonable limits, give positive feedback for acceptable   - Administer medications as ordered and monitor of potential side effects  Outcome: Progressing  Goal: Agree to be compliant with medication regime, as prescribed and report medication side effects  Description: Interventions:  - Offer appropriate PRN medication and supervise ingestion; conduct AIMS, as needed   Outcome: Progressing     Problem: Risk for Self Injury/Neglect  Goal: Verbalize thoughts and feelings  Description: Interventions:  - Assess and re-assess patient's lethality and potential for self-injury  - Engage patient in 1:1 interactions, daily, for a minimum of 15 minutes  - Encourage patient to express feelings, fears, frustrations, hopes  - Establish rapport/trust with patient   Outcome: Progressing  Goal: Refrain from harming self  Description: Interventions:  - Monitor patient closely, per order  - Develop a trusting relationship  - Supervise medication ingestion, monitor effects and side effects   Outcome: Progressing     Problem: Depression  Goal: Refrain from isolation  Description: Interventions:  - Develop a trusting relationship   - Encourage socialization   Outcome: Progressing     Problem: Alteration in Orientation  Goal: Express concerns related to confused thinking related to:  Description: Interventions:  - Encourage patient to express feelings, fears, frustrations, hopes  - Assign consistent caregivers   - [place]/re-orient patient as needed  - Allow comfort items, as appropriate  - Provide visual cues, signs, etc    Outcome: Progressing  Goal: Cooperate with recommended testing/procedures  Description: Interventions:  - Determine need for ancillary testing  - Observe for mental status changes  - Implement falls/precaution protocol   Outcome: Progressing

## 2022-05-01 NOTE — NURSING NOTE
When writer greeted the pt in the morning, pt made it a priority to tell the writer, "I have no diarrhea and no nausea "  Pt was calm, cooperative, overly polite and pleasant upon approach, preoccupied at times, more visible in the community, and social with select peers  Pt denies SI/HI/VH  Pt states having AH telling pt to hurt self  Pt was able to contract for safety  Pt was provided PRN Zyprexa 10 mg PO to help with increased agitation related to "voices telling me to cut myself "  Pt was compliant with medications and meals, but declined the morning dose of Senokot, "I don't want that diarrhea coming back "  Pt remains on 2000 mL fluid restriction (pt receptive)  Will continue to monitor

## 2022-05-01 NOTE — PROGRESS NOTES
Progress Note - Behavioral Health   Leydi Bhakti Mcgrath 47 y o  female MRN: [id number]  Unit/Bed#: U 344-02 Encounter: [id number]    Assessment/Plan   Principal Problem:    Major depression with psychotic features ([facility] 75 )  Active Problems:    Primary hypertension    Hyperlipidemia    Class 3 severe obesity due to excess calories without serious comorbidity in adult [facility])    Medical clearance for psychiatric admission    Seizures ([facility] 75 )    Hyponatremia      Continue current medication regimen:  o Depakote ER 2000 mg qhs for mood stabilization  o Haldol 10 mg daily for mood stabilization and psychosis  o Minipress 1 mg qhs for nightmares  o Increase Remeron from 15 mg to 22 5 mg qhs for depression, sleep   Continue to promote patient participation in group therapy, milieu therapy, occupational therapy   Continue medical management by primary team    Discharge disposition:  pending  Subjective: The patient was evaluated this morning for continuity of care and no acute distress noted throughout the evaluation  Over the past 24 hours staff noted the patient was c/o nausea and diarrhea which has occurred about 5 times throughout the day  Remains calm, cooperative, reports CAH of voices telling her to hurt herself  Medication adherent  Today on evaluation, the patient states that her depression has improved since admission but still feels depressed  She has no diarrhea today and no nausea  She reports CAH of voices telling her to end her life by walking into traffic, but reports that she is safe here and will not attempt to do anything to harm herself or others  Denies HI  She reports that she has passive suicidal thoughts, wanting to walk into traffic  When exploring her AH, she says that the voices are actually her own thoughts and negative thinking and not external voices  She denies VH  Feels safe on the unit  Reports no med side effects  Reports restful 7 hour sleep and has good appetite      Psychiatric Review of Systems:  Behavior over the last 24 hours:  unchanged  Sleep: improved  Appetite: improved  Medication side effects: No   ROS: no complaints, all other systems are negative    Current Medications:  Current Facility-Administered Medications   Medication Dose Route Frequency Provider Last Rate    acetaminophen  650 mg Oral Q6H PRN Janet Nogueira MD      acetaminophen  650 mg Oral Q4H PRN Janet Nogueira MD      acetaminophen  975 mg Oral Q6H PRN Janet Nogueira MD      aluminum-magnesium hydroxide-simethicone  30 mL Oral Q4H PRN Janet Nogueira MD      artificial tear  1 application Both Eyes M3J PRN Janet Nogueira MD      aspirin  81 mg Oral Daily Janet Nogueira MD      atorvastatin  40 mg Oral Daily With Willam Musa MD      benztropine  1 mg Intramuscular BID PRN Janet Nogueira MD      benztropine  1 mg Oral BID PRN Janet Nogueira MD      cholecalciferol  1,000 Units Oral Daily Janet Nogueira MD      hydrOXYzine HCL  50 mg Oral Q6H PRN Max 4/day Janet Nogueira MD      Or    diphenhydrAMINE  50 mg Intramuscular Q6H PRN Janet Nogueira MD      divalproex sodium  2,000 mg Oral HS Janet Nogueira MD      haloperidol  10 mg Oral HS Kris Marquez MD      hydrOXYzine HCL  100 mg Oral Q6H PRN Max 4/day Janet Nogueira MD      Or    LORazepam  2 mg Intramuscular Q6H PRN Janet Nogueira MD      hydrOXYzine HCL  25 mg Oral Q6H PRN Max 4/day Janet Nogueira MD      ibuprofen  600 mg Oral Q6H PRN Metpaco Patel PA-IKER      levOCARNitine  330 mg/day Oral QAM Janet Nogueira MD      lisinopril  20 mg Oral Daily Jante Nogueira MD      loperamide  2 mg Oral TID PRN Falguni Guy MD      melatonin  3 mg Oral HS PRN Janet Nogueira MD      mirtazapine  22 5 mg Oral HS Archana Dwyer MD      OLANZapine  10 mg Oral Q3H PRN Max 3/day Janet Nogueira MD      Or   Timur Frederick OLANZapine  10 mg Intramuscular Q3H PRN Max 3/day Jakob Nevarez MD      OLANZapine  5 mg Oral Q3H PRN Max 6/day Jakob Nevarez MD      Or    OLANZapine  5 mg Intramuscular Q3H PRN Max 6/day Jakob Nevarez MD      OLANZapine  2 5 mg Oral Q3H PRN Max 8/day Jakob Nevarez MD      ondansetron  4 mg Oral Q6H PRN Manual Passer, PA-C      polyethylene glycol  17 g Oral Daily PRN Jakob Nevarez MD      prazosin  1 mg Oral HS Jakob Nevarez MD      propranolol  10 mg Oral Q8H PRN Jakob Nevarez MD      senna-docusate sodium  1 tablet Oral BID [facility] Medications: all current active meds have been reviewed and continue current psychiatric medications  Vital signs in last 24 hours:  Temp:  [97 6 °F (36 4 °C)-98 8 °F (37 1 °C)] 98 8 °F (37 1 °C)  HR:  [] 104  Resp:  [16-18] 18  BP: (127-141)/(76-77) 141/76    Laboratory results:    I have personally reviewed all pertinent laboratory/tests results    Most Recent Labs:   Lab Results   Component Value Date    WBC 8 19 04/29/2022    RBC 4 17 04/29/2022    HGB 13 3 04/29/2022    HCT 42 1 04/29/2022     04/29/2022    RDW 14 0 04/29/2022    NEUTROABS 7 02 04/25/2022    SODIUM 136 (L) 04/29/2022    K 3 7 04/29/2022    CL 95 (L) 04/29/2022    CO2 35 (H) 04/29/2022    BUN 15 04/29/2022    CREATININE 0 62 04/29/2022    GLUC 74 04/29/2022    GLUF 74 04/29/2022    CALCIUM 9 3 04/29/2022    AST 29 04/26/2022    ALT 17 04/26/2022    ALKPHOS 64 04/26/2022    TP 6 9 04/26/2022    ALB 3 0 (L) 04/26/2022    TBILI 0 72 04/26/2022    CHOLESTEROL 169 04/28/2022    HDL 45 (L) 04/28/2022    TRIG 180 (H) 04/28/2022    LDLCALC 88 04/28/2022    NONHDLC 124 04/28/2022    VALPROICTOT <10 0 (L) 05/01/2022    [id number] 2 680 04/25/2022    PREGUR negative 04/10/2022    PREGSERUM Negative 04/28/2022    RPR Non-Reactive 02/13/2022    HGBA1C 4 8 12/31/2021    EAG 91 12/31/2021 Mental Status Evaluation:    Appearance:  age appropriate  female, dressed casually, lying on bed, listening to music on ipad  NAD  Behavior:  pleasant, cooperative, calm   Speech:  normal rate and volume   Mood:  "depressed"   Affect:  constricted   Thought Process:  goal directed   Associations: intact associations   Thought Content:  normal, no overt delusions   Perceptual Disturbances: no auditory hallucinations, no visual hallucinations, does not appear responding to internal stimuli   Risk Potential: Suicidal ideation - Yes, passive death wish  Homicidal ideation - None  Potential for aggression - No   Sensorium:  oriented to person, place and time/date   Memory:  recent and remote memory grossly intact   Consciousness:  alert and awake   Attention/Concentration: attention span and concentration are age appropriate   Insight:  limited   Judgment: limited   Gait/Station: normal gait/station   Motor Activity: no abnormal movements     Progress Toward Goals: progressing  Recommended Treatment:   See above for assessment and plan  Risks, benefits and possible side effects of Medications:   Risks, benefits, and possible side effects of medications explained to patient and patient verbalizes understanding  This note has been constructed using a voice recognition system  There may be translation, syntax, or grammatical errors  If you have any questions, please contact the dictating provider      Nolan Lamar MD  Psychiatry Resident PGY-2

## 2022-05-01 NOTE — NURSING NOTE
Pt is isolative to room  Pt refused PM medication stating "I think I will throw up if I take them"  Pt refused any medication to help with nausea  Pt denies SI/HI and AH/VH at this time  No unmet needs reported  Will continue to monitor

## 2022-05-01 NOTE — NURSING NOTE
Patient visible on unit, calm, and cooperative  Patient denies SI/HI/VH, but earlier had auditory hallucinations  Patient compliant with med and meals

## 2022-05-02 LAB
ANION GAP SERPL CALCULATED.3IONS-SCNC: 7 MMOL/L (ref 5–14)
BUN SERPL-MCNC: 8 MG/DL (ref 5–25)
CALCIUM SERPL-MCNC: 8.5 MG/DL (ref 8.4–10.2)
CHLORIDE SERPL-SCNC: 100 MMOL/L (ref 97–108)
CO2 SERPL-SCNC: 29 MMOL/L (ref 22–30)
CREAT SERPL-MCNC: 0.49 MG/DL (ref 0.6–1.2)
GFR SERPL CREATININE-BSD FRML MDRD: 113 ML/MIN/1.73SQ M
GLUCOSE P FAST SERPL-MCNC: 105 MG/DL (ref 70–99)
GLUCOSE SERPL-MCNC: 105 MG/DL (ref 70–99)
POTASSIUM SERPL-SCNC: 3.5 MMOL/L (ref 3.6–5)
SODIUM SERPL-SCNC: 136 MMOL/L (ref 137–147)
VALPROATE SERPL-MCNC: 43.8 UG/ML (ref 50–120)

## 2022-05-02 PROCEDURE — 80048 BASIC METABOLIC PNL TOTAL CA: CPT | Performed by: PSYCHIATRY & NEUROLOGY

## 2022-05-02 PROCEDURE — 99232 SBSQ HOSP IP/OBS MODERATE 35: CPT | Performed by: PSYCHIATRY & NEUROLOGY

## 2022-05-02 PROCEDURE — 80164 ASSAY DIPROPYLACETIC ACD TOT: CPT | Performed by: PSYCHIATRY & NEUROLOGY

## 2022-05-02 RX ORDER — MIRTAZAPINE 30 MG/1
30 TABLET, FILM COATED ORAL
Status: DISCONTINUED | OUTPATIENT
Start: 2022-05-02 | End: 2022-05-09 | Stop reason: HOSPADM

## 2022-05-02 RX ORDER — HALOPERIDOL 5 MG
5 TABLET ORAL DAILY
Status: DISCONTINUED | OUTPATIENT
Start: 2022-05-02 | End: 2022-05-04

## 2022-05-02 RX ORDER — HALOPERIDOL 5 MG
TABLET ORAL
Status: DISCONTINUED
Start: 2022-05-02 | End: 2022-05-09 | Stop reason: HOSPADM

## 2022-05-02 RX ADMIN — ASPIRIN 81 MG CHEWABLE TABLET 81 MG: 81 TABLET CHEWABLE at 08:01

## 2022-05-02 RX ADMIN — MIRTAZAPINE 30 MG: 30 TABLET, FILM COATED ORAL at 21:18

## 2022-05-02 RX ADMIN — OLANZAPINE 10 MG: 10 TABLET, FILM COATED ORAL at 08:05

## 2022-05-02 RX ADMIN — PRAZOSIN HYDROCHLORIDE 1 MG: 1 CAPSULE ORAL at 21:19

## 2022-05-02 RX ADMIN — LISINOPRIL 20 MG: 10 TABLET ORAL at 08:02

## 2022-05-02 RX ADMIN — HALOPERIDOL 10 MG: 10 TABLET ORAL at 21:18

## 2022-05-02 RX ADMIN — ATORVASTATIN CALCIUM 40 MG: 40 TABLET, FILM COATED ORAL at 17:05

## 2022-05-02 RX ADMIN — LEVOCARNITINE 330 MG: 1 SOLUTION ORAL at 08:01

## 2022-05-02 RX ADMIN — DIVALPROEX SODIUM 2000 MG: 500 TABLET, EXTENDED RELEASE ORAL at 21:17

## 2022-05-02 RX ADMIN — SENNOSIDES AND DOCUSATE SODIUM 1 TABLET: 50; 8.6 TABLET ORAL at 17:05

## 2022-05-02 RX ADMIN — HALOPERIDOL 5 MG: 5 TABLET ORAL at 12:14

## 2022-05-02 RX ADMIN — Medication 1000 UNITS: at 08:02

## 2022-05-02 NOTE — NURSING NOTE
Pt approached nursing station after group and reported numbness in L hand  Pt reports "this has never happened"  She states arm was sitting on the table, not under anything or above heart  Pt states "I cant feel when youre touching my fingers"  Resident approached pt and assessed pt as well, then pt states "yes I can feel pain"  Medical provider made aware and at this time pt has not reported any other symptoms  Will continue to monitor

## 2022-05-02 NOTE — NURSING NOTE
Leydi denies HI/VH but is reporting +SI/AH, command in nature to "cut wrists or my throat"  Pt requested and received PRN Zyprexa 10 mg for +AH  Medication effective per pt and pt reports ability to contract for safety  Pt is accepting of her medications and reports eating and sleeping well  No behavioral issues to be reported  Will continue to monitor

## 2022-05-02 NOTE — PLAN OF CARE
Problem: Alteration in Thoughts and Perception  Goal: Refrain from acting on delusional thinking/internal stimuli  Description: Interventions:  - Monitor patient closely, per order   - Utilize least restrictive measures   - Set reasonable limits, give positive feedback for acceptable   - Administer medications as ordered and monitor of potential side effects  Outcome: Progressing  Goal: Agree to be compliant with medication regime, as prescribed and report medication side effects  Description: Interventions:  - Offer appropriate PRN medication and supervise ingestion; conduct AIMS, as needed   Outcome: Progressing     Problem: Ineffective Coping  Goal: Participates in unit activities  Description: Interventions:  - Provide therapeutic environment   - Provide required programming   - Redirect inappropriate behaviors   Outcome: Progressing     Problem: Risk for Self Injury/Neglect  Goal: Verbalize thoughts and feelings  Description: Interventions:  - Assess and re-assess patient's lethality and potential for self-injury  - Engage patient in 1:1 interactions, daily, for a minimum of 15 minutes  - Encourage patient to express feelings, fears, frustrations, hopes  - Establish rapport/trust with patient   Outcome: Progressing  Goal: Refrain from harming self  Description: Interventions:  - Monitor patient closely, per order  - Develop a trusting relationship  - Supervise medication ingestion, monitor effects and side effects   Outcome: Progressing     Problem: Depression  Goal: Refrain from isolation  Description: Interventions:  - Develop a trusting relationship   - Encourage socialization   Outcome: Progressing     Problem: Alteration in Orientation  Goal: Express concerns related to confused thinking related to:  Description: Interventions:  - Encourage patient to express feelings, fears, frustrations, hopes  - Assign consistent caregivers   - [place]/re-orient patient as needed  - Allow comfort items, as appropriate  - Provide visual cues, signs, etc    Outcome: Progressing  Goal: Cooperate with recommended testing/procedures  Description: Interventions:  - Determine need for ancillary testing  - Observe for mental status changes  - Implement falls/precaution protocol   Outcome: Progressing     Problem: DISCHARGE PLANNING - CARE MANAGEMENT  Goal: Discharge to post-acute care or home with appropriate resources  Description: INTERVENTIONS:  - Conduct assessment to determine patient/family and health care team treatment goals, and need for post-acute services based on payer coverage, community resources, and patient preferences, and barriers to discharge  - Address psychosocial, clinical, and financial barriers to discharge as identified in assessment in conjunction with the patient/family and health care team  - Arrange appropriate level of post-acute services according to patients   needs and preference and payer coverage in collaboration with the physician and health care team  - Communicate with and update the patient/family, physician, and health care team regarding progress on the discharge plan  - Arrange appropriate transportation to post-acute venues  Outcome: Progressing

## 2022-05-02 NOTE — PLAN OF CARE
Pt continues to complain of CAH telling her to harm herself  Pt is redirectable to use coping strategies  Pt reports looking forward to seeing her boyfriend when she returns to her group home     Problem: DISCHARGE PLANNING - CARE MANAGEMENT  Goal: Discharge to post-acute care or home with appropriate resources  Description: INTERVENTIONS:  - Conduct assessment to determine patient/family and health care team treatment goals, and need for post-acute services based on payer coverage, community resources, and patient preferences, and barriers to discharge  - Address psychosocial, clinical, and financial barriers to discharge as identified in assessment in conjunction with the patient/family and health care team  - Arrange appropriate level of post-acute services according to patients   needs and preference and payer coverage in collaboration with the physician and health care team  - Communicate with and update the patient/family, physician, and health care team regarding progress on the discharge plan  - Arrange appropriate transportation to post-acute venues  Outcome: Progressing

## 2022-05-02 NOTE — PROGRESS NOTES
Met briefly with Leydi she states she is having auditory hallucinations and suicidal thoughts; but her affect remains flat but she does not appear concerned  She did not do her assignment to track her thoughts or feelings  Nor can she tell this writer what she read about coping with voices  She did note she was sad due to her boyfriend crying that he missed her  It is this writer's impression that patient uses the comments of hearing voices and being suicidal as a means to communicate vs as severe as it sounds  When asked questions to clarify her thoughts about the voices,, self harm her response has been "I don't know"  Patient would benefit from learning social skills since she can not identify any of her emotions or what she needs  Her focus always comes around to her relationships with boyfriends  It seems being wanted has a comforting effect on her  Therapist will continue to offer support and encouragement

## 2022-05-02 NOTE — PROGRESS NOTES
05/02/22 1300   Activity/Group Checklist   Group   (recovery group)   Attendance Attended   Attendance Duration (min) Greater than 60   Interactions Interacted appropriately   Affect/Mood Appropriate   Goals Achieved Able to listen to others; Able to engage in interactions; Able to self-disclose; Able to recieve feedback Attending Only

## 2022-05-02 NOTE — QUICK NOTE
Patient reported left hand numbness/pain in center of palm to nursing staff on 05/02 while in group session  Discussed with patient, states at time her hand was resting palm facing down on table, and patient was intermittently leaning on elbow  Patient states she experiences numbness in bilateral hands when leaning on hands or gripping pen for too long  Patient denies neck pain, shoulder pain, elbow pain, back pain or pain/numbness/tingling extending from hand up left arm  Patient denies headaches, fevers/chills, dizziness, double vision, loss of vision or blurriness of vision, loss of  strength or motor weakness in either hand or arm, chest pain/shortness of breath, abdominal symptoms  Upon my interview patient states symptoms have mostly resolved with very slight tingling in left fingers  On exam NIHSS 0  No cranial nerve deficits  Sensation grossly intact to crude touch throughout, motor strength 5/5 throughout  No limb ataxia, no dysarthria, no visual deficits  No pain with palpation of wrist/hand  Low suspicion for neurologic etiology of symptoms, suspect patient may have underlying carpal tunnel syndrome as this has happened in the past verses isolated mechanical compression nerves/vessels  Instructed patient to monitor for recurrence of symptoms or worsening of symptoms and to alert nurses as needed

## 2022-05-02 NOTE — NURSING NOTE
Patient remained isolative to room throughout the evening  Upon approach, patient reported AH  Patient was given HS medications and stated she will request additional PRNs if she has no relief  Denied any unmet needs  Will monitor

## 2022-05-02 NOTE — PROGRESS NOTES
Progress Note - Behavioral Health   Leydi Castañeda 47 y o  female MRN: [id number]  Unit/Bed#: -02 Encounter: [id number]    Assessment/Plan   Principal Problem:    Major depression with psychotic features ([facility] )  Active Problems:    Primary hypertension    Hyperlipidemia    Class 3 severe obesity due to excess calories without serious comorbidity in [facility])    Medical clearance for psychiatric admission    Seizures ([facility] )    Hyponatremia      Recommended Treatment:     1  Continue with medications:  a  Prazosin 1mg HS for nightmares  b  Increase Haldol to 5mg in the AM and 10 mg HS for continued auditory hallucinations  c  Increase Rameron to 30mg HS for continued depression  d  Continue Depakote 2000mg HS for seizures  e  Re-Check Depakote level tonight prior to Depakote administration  2  Continue group therapy, milieu therapy and occupational therapy  3  Continue safety checks and collection of vitals per protocol  4  Continue with SLIM management as indicated  5  Continue coordinating with case management    Case discussed with treatment team   Risks, benefits and possible side effects of Medications: Risks, benefits, and possible side effects of medications have been explained to the patient, who verbalizes understanding    Legal Status: 201  Diet: Regular    ------------------------------------------------------------    Subjective: Per nursing report, Leydi has been pleasant and cooperative on the unit and compliant with medications  She had diarrhea on Saturday 4/30 and had nausea and vomiting the night before  It was reported that she refused her evening medications on 4/30 due to the fear that she would vomit them back up  She continued to experience CAH to hurt herself through cutting her wrists and her neck, as well as walking out into traffic  She also consistently demonstrated the ability to contract for safety with staff when these voices begin to overwhelm her      On evaluation, Leydi expressed that she had a "bad" weekend  She states her mood is "depressed " She said her depression and the auditory hallucinations have been getting worse over the weekend, but cannot identify a cause  She has been speaking with her boyfriend frequently and reports that he "lifts my spirits when he talks to me"  When asked if she believes her boyfriend cares about her the same way she does for him, she appeared to be more agreeable than previously though still stated "I hope so"  She reports sleeping "fair", and got "about 5 hours" of sleep last night  She said she had another nightmare about the traumatic experience with her ex-boyfriend last night  Her appetite has been consistent and appropriate  She denies any adverse effects from medications  Her goals for today are to attend groups and stay positive  She also believes her boyfriend is going to call her later today  Today, Leydi is layo for safety on the unit  She denies homicidal ideation, and visual hallucinations  She denies paranoid thoughts or ideation  PRNs overnight:   - Zyprexa mg 5/1/22 @ 1151; 5/2/22 @ 0805    VS: Reviewed, within normal limits    Progress Toward Goals: Slow improvement    Psychiatric Review of Systems:  Behavior over the last 24 hours: unchanged  Sleep: insomnia and nightmares  Appetite: adequate  Medication side effects: none verbalized  Medical ROS: Complete review of systems is negative except as noted above      Vital signs in last 24 hours:  Temp:  [97 5 °F (36 4 °C)-97 7 °F (36 5 °C)] 97 7 °F (36 5 °C)  HR:  [] 101  Resp:  [16] 16  BP: (116-127)/(60-70) 121/70    Mental Status Exam:  Appearance:  alert, appropriate contact, appears stated age, casually dressed, appropriate grooming and hygiene, obese and overtly appearing  female   Behavior:  calm, cooperative and anxious appearing at times   Motor: no abnormal movements and normal gait and balance   Speech:  spontaneous, clear, normal rate, normal volume, coherent and quick to answer   Mood:  "depressed"   Affect:  Blunted but more reactive as compared to previous days   Thought Process:  organized, logical, perseverative regarding her depressed thoughts and auditory hallucinations   Thought Content: no verbalized delusions or overt paranoia   Perceptual disturbances: does not appear to be responding to internal stimuli at this time and auditory allucinations to harm herself, denies visual hallucinations   Risk Potential: suicidal ideation with plan to cut her wrists ans throat and walk out into traffic, denies homicidal ideations, contracts for safety on unit   Cognition: cognition not formally tested   Insight:  Improving   Judgment: Limited     Current Medications:  Current Facility-Administered Medications   Medication Dose Route Frequency Provider Last Rate    acetaminophen  650 mg Oral Q6H PRN Josesito Winters MD      acetaminophen  650 mg Oral Q4H PRN Josesito Winters MD      acetaminophen  975 mg Oral Q6H PRN Josesito Winters MD      aluminum-magnesium hydroxide-simethicone  30 mL Oral Q4H PRN Josesito Winters MD      artificial tear  1 application Both Eyes E6L PRN Josesito Winters MD      aspirin  81 mg Oral Daily Josesito Winters MD      atorvastatin  40 mg Oral Daily With Debra Segura MD      benztropine  1 mg Intramuscular BID PRN Josesito Winters MD      benztropine  1 mg Oral BID PRN Josesito Winters MD      cholecalciferol  1,000 Units Oral Daily Josesito Winters MD      hydrOXYzine HCL  50 mg Oral Q6H PRN Max 4/day Josesito Winters MD      Or    diphenhydrAMINE  50 mg Intramuscular Q6H PRN Josesito Winters MD      divalproex sodium  2,000 mg Oral HS Josesito Winters MD      haloperidol  10 mg Oral HS Kelly Sheppard MD      haloperidol  5 mg Oral Daily Kelly Sheppard MD      hydrOXYzine HCL  100 mg Oral Q6H PRN Max 4/day Josesito Winters MD Or    LORazepam  2 mg Intramuscular Q6H PRN Eino Come, MD      hydrOXYzine HCL  25 mg Oral Q6H PRN Max 4/day Eino Come, MD      ibuprofen  600 mg Oral Q6H PRN Jannetta Schaumann, PA-C      levOCARNitine  330 mg/day Oral QAM Eino Come, MD      lisinopril  20 mg Oral Daily Eino Come, MD      loperamide  2 mg Oral TID PRN Manuel Bettencourt MD      melatonin  3 mg Oral HS PRN Eino Come, MD      mirtazapine  30 mg Oral HS Bryant Serna MD      OLANZapine  10 mg Oral Q3H PRN Max 3/day Eino Come, MD      Or    OLANZapine  10 mg Intramuscular Q3H PRN Max 3/day Eino Come, MD      OLANZapine  5 mg Oral Q3H PRN Max 6/day Eino Come, MD      Or    OLANZapine  5 mg Intramuscular Q3H PRN Max 6/day Eino Come, MD      OLANZapine  2 5 mg Oral Q3H PRN Max 8/day Eino Come, MD      ondansetron  4 mg Oral Q6H PRN Kirk Chairez PA-C      polyethylene glycol  17 g Oral Daily PRN Eino Come, MD      prazosin  1 mg Oral HS Eino Come, MD      propranolol  10 mg Oral Q8H PRN Eino Come, MD      senna-docusate sodium  1 tablet Oral BID Az Cedillo Medications: all current active meds have been reviewed  Changes as in plan section above  Laboratory results:  I have personally reviewed all pertinent laboratory/tests results     Recent Results (from the past 48 hour(s))   Valproic acid level, total    Collection Time: 05/01/22  6:44 AM   Result Value Ref Range    Valproic Acid, Total <10 0 (L) 50 - 120 ug/mL   Basic metabolic panel    Collection Time: 05/02/22  6:47 AM   Result Value Ref Range    Sodium 136 (L) 137 - 147 mmol/L    Potassium 3 5 (L) 3 6 - 5 0 mmol/L    Chloride 100 97 - 108 mmol/L    CO2 29 22 - 30 mmol/L    ANION GAP 7 5 - 14 mmol/L    BUN 8 5 - 25 mg/dL    Creatinine 0 49 (L) 0 60 - 1 20 mg/dL    Glucose 105 (H) 70 - 99 mg/dL    Glucose, Fasting 105 (H) 70 - 99 mg/dL    Calcium 8 5 8 4 - 10 2 mg/dL    eGFR 113 ml/min/1 73sq m        This note has been constructed using a voice recognition system  There may be translation, syntax, or grammatical errors  If you have any questions, please contact the dictating author      [address] Student  MS-IV

## 2022-05-02 NOTE — PROGRESS NOTES
BELL Group Note     05/02/22 1115   Activity/Group Checklist   Group Life Skills  (Values)   Attendance Attended   Attendance Duration (min) 46-60   Interactions Interacted appropriately   Affect/Mood Appropriate;Calm   Goals Achieved Identified feelings; Discussed self-esteem issues; Able to listen to others; Able to engage in interactions; Able to reflect/comment on own behavior;Able to self-disclose; Able to recieve feedback; Able to give feedback to another

## 2022-05-02 NOTE — PROGRESS NOTES
05/02/22 1005   Team Meeting   Meeting Type Daily Rounds   Team Members Present   Team Members Present Physician;Nurse;   Physician Team Member Dr Keshia Angel Team Member [id number] Management Team Member Mamadou   Patient/Family Present   Patient Present No   Patient's Family Present No   Pt is medication compliant  Pt continues to report +CAH to harm herself  Pt denies SI/HI/VH  Discharge to be determined

## 2022-05-03 PROCEDURE — 99232 SBSQ HOSP IP/OBS MODERATE 35: CPT | Performed by: PSYCHIATRY & NEUROLOGY

## 2022-05-03 RX ORDER — PRAZOSIN HYDROCHLORIDE 1 MG/1
2 CAPSULE ORAL
Status: DISCONTINUED | OUTPATIENT
Start: 2022-05-03 | End: 2022-05-09 | Stop reason: HOSPADM

## 2022-05-03 RX ADMIN — ATORVASTATIN CALCIUM 40 MG: 40 TABLET, FILM COATED ORAL at 17:01

## 2022-05-03 RX ADMIN — MIRTAZAPINE 30 MG: 30 TABLET, FILM COATED ORAL at 21:31

## 2022-05-03 RX ADMIN — LEVOCARNITINE 330 MG: 1 SOLUTION ORAL at 08:22

## 2022-05-03 RX ADMIN — SENNOSIDES AND DOCUSATE SODIUM 1 TABLET: 50; 8.6 TABLET ORAL at 08:22

## 2022-05-03 RX ADMIN — SENNOSIDES AND DOCUSATE SODIUM 1 TABLET: 50; 8.6 TABLET ORAL at 17:01

## 2022-05-03 RX ADMIN — HALOPERIDOL 5 MG: 5 TABLET ORAL at 08:23

## 2022-05-03 RX ADMIN — LISINOPRIL 20 MG: 10 TABLET ORAL at 08:22

## 2022-05-03 RX ADMIN — DIVALPROEX SODIUM 2000 MG: 500 TABLET, EXTENDED RELEASE ORAL at 21:31

## 2022-05-03 RX ADMIN — HALOPERIDOL 10 MG: 10 TABLET ORAL at 21:32

## 2022-05-03 RX ADMIN — ASPIRIN 81 MG CHEWABLE TABLET 81 MG: 81 TABLET CHEWABLE at 08:22

## 2022-05-03 RX ADMIN — PRAZOSIN HYDROCHLORIDE 2 MG: 1 CAPSULE ORAL at 21:45

## 2022-05-03 RX ADMIN — OLANZAPINE 10 MG: 10 INJECTION, POWDER, FOR SOLUTION INTRAMUSCULAR at 17:50

## 2022-05-03 RX ADMIN — OLANZAPINE 10 MG: 10 TABLET, FILM COATED ORAL at 14:50

## 2022-05-03 RX ADMIN — Medication 1000 UNITS: at 08:22

## 2022-05-03 NOTE — PROGRESS NOTES
BELL Group Note     05/03/22 1115   Activity/Group Checklist   Group Personal control  (Music and Lyrics)   Attendance Attended   Attendance Duration (min) 46-60   Interactions Other (Comment)  (Slept)   Affect/Mood Constricted   Goals Achieved Able to listen to others; Able to engage in interactions; Able to recieve feedback; Able to give feedback to another

## 2022-05-03 NOTE — PROGRESS NOTES
Attempted to meet with Leydi to discuss coping skills for hearing voices  She refused, "I am not feeling well"  When asked she said its my mental health  Agreed to meet later in the day

## 2022-05-03 NOTE — NURSING NOTE
Leydi denies HI/VH and reports +SI/AH to "hurt myself"  She denies any plan or intent and is able to contract for safety  She is accepting of her medications  She reports eating and sleeping well  No behavioral issues to be reported  Will continue to monitor

## 2022-05-03 NOTE — NURSING NOTE
Pt reported to T that she was having +AH to hurt self and asked if she could have a book to distract her  Book given to pt but pt did not utilize it long  Dinner trays arrived and pt returned her tray to the cart and then came back and took her knife and went to her room to harm herself because "the voices told me I am fat and ugly"  Pt used the knife to cut her L FA and then came out of her room and told staff what she had done  Pt opened small area on LFA with scant blood  Psychiatrist contacted and orders for finger foods obtained  Pts room was emptied and paper scrubs and sheets given  PRN Zyprexa IM 10 mg administered  Pt is in the milieu being monitored  She was accepting of the new orders and medication  Will continue to monitor

## 2022-05-03 NOTE — NURSING NOTE
Pt reports +AH and requested and received PRN Zyprexa 10 mg  Pt states it helps "a little bit" but she wanted something that may help  Will monitor for effectiveness

## 2022-05-03 NOTE — PLAN OF CARE
Problem: Alteration in Thoughts and Perception  Goal: Refrain from acting on delusional thinking/internal stimuli  Description: Interventions:  - Monitor patient closely, per order   - Utilize least restrictive measures   - Set reasonable limits, give positive feedback for acceptable   - Administer medications as ordered and monitor of potential side effects  5/3/2022 0729 by Zhen Gonzales RN  Outcome: Progressing  5/3/2022 0729 by Zhen Gonzales RN  Outcome: Progressing  Goal: Agree to be compliant with medication regime, as prescribed and report medication side effects  Description: Interventions:  - Offer appropriate PRN medication and supervise ingestion; conduct AIMS, as needed   5/3/2022 0729 by Zhen Gonzales RN  Outcome: Progressing  5/3/2022 0729 by Zhen Gonzales RN  Outcome: Progressing     Problem: Ineffective Coping  Goal: Participates in unit activities  Description: Interventions:  - Provide therapeutic environment   - Provide required programming   - Redirect inappropriate behaviors   5/3/2022 0729 by Zhen Gonzales RN  Outcome: Progressing  5/3/2022 0729 by Zhen Gonzales RN  Outcome: Progressing     Problem: Risk for Self Injury/Neglect  Goal: Verbalize thoughts and feelings  Description: Interventions:  - Assess and re-assess patient's lethality and potential for self-injury  - Engage patient in 1:1 interactions, daily, for a minimum of 15 minutes  - Encourage patient to express feelings, fears, frustrations, hopes  - Establish rapport/trust with patient   5/3/2022 0729 by Zhen Gonzales RN  Outcome: Progressing  5/3/2022 0729 by Zhen Gonzales RN  Outcome: Progressing  Goal: Refrain from harming self  Description: Interventions:  - Monitor patient closely, per order  - Develop a trusting relationship  - Supervise medication ingestion, monitor effects and side effects   5/3/2022 0729 by Zhen Gonzales RN  Outcome: Progressing  5/3/2022 0729 by Zhen Gonzales RN  Outcome: Progressing     Problem: Depression  Goal: Refrain from isolation  Description: Interventions:  - Develop a trusting relationship   - Encourage socialization   5/3/2022 0729 by Walt Felix RN  Outcome: Progressing  5/3/2022 0729 by Walt Felix RN  Outcome: Progressing     Problem: Alteration in Orientation  Goal: Express concerns related to confused thinking related to:  Description: Interventions:  - Encourage patient to express feelings, fears, frustrations, hopes  - Assign consistent caregivers   - [place]/re-orient patient as needed  - Allow comfort items, as appropriate  - Provide visual cues, signs, etc    5/3/2022 0729 by Walt Felix RN  Outcome: Progressing  5/3/2022 0729 by Walt Felix RN  Outcome: Progressing  Goal: Cooperate with recommended testing/procedures  Description: Interventions:  - Determine need for ancillary testing  - Observe for mental status changes  - Implement falls/precaution protocol   5/3/2022 0729 by Walt Felix RN  Outcome: Progressing  5/3/2022 0729 by Walt Felix RN  Outcome: Progressing     Problem: DISCHARGE PLANNING - CARE MANAGEMENT  Goal: Discharge to post-acute care or home with appropriate resources  Description: INTERVENTIONS:  - Conduct assessment to determine patient/family and health care team treatment goals, and need for post-acute services based on payer coverage, community resources, and patient preferences, and barriers to discharge  - Address psychosocial, clinical, and financial barriers to discharge as identified in assessment in conjunction with the patient/family and health care team  - Arrange appropriate level of post-acute services according to patients   needs and preference and payer coverage in collaboration with the physician and health care team  - Communicate with and update the patient/family, physician, and health care team regarding progress on the discharge plan  - Arrange appropriate transportation to post-acute venues  5/3/2022 0729 by Walt Felix RN  Outcome: Progressing  5/3/2022 0729 by Shasha Pacheco, RN  Outcome: Progressing

## 2022-05-03 NOTE — NURSING NOTE
Patient has been in the milieu and attended evening wrap up group  Pleasant appropriate and cooperative with blood draw, vital signs and HS medication including increased dose of Remeron  Educated about Prazosin because she asked what it was and did not seem to understand why she was receiving it  Cooperative when the rationale for use was explained to her  She appeared preoccupied at times but did not offer any somatic complaints

## 2022-05-03 NOTE — PROGRESS NOTES
05/03/22 1300   Activity/Group Checklist   Group   (recovery group)   Attendance Attended   Attendance Duration (min) 46-60   Interactions Interacted appropriately   Affect/Mood Appropriate   Goals Achieved Able to listen to others; Able to engage in interactions; Able to self-disclose; Able to recieve feedback; Discussed self-esteem issues; Discussed coping strategies   Discussion was on voices  She was given two handouts on coping strategies to cope; she was able to highlight the coping skills that she believes assist her  She did note that her voices began at 22 when a boyfriend broke up with her  She is so interdependent on her relationship that she appears to be planning her next hospitalization due to the BF going into the hospital  She is resistant to exploring alternative behaviors  It appears she uses her mental health as a means to create relationships even if they don't meet her needs completely  She appears to lack the skills to be intimate with herself or others

## 2022-05-03 NOTE — QUICK NOTE
Examined patient following self-harming episode in which patient scratched left forearm with plastic knife  Pictures are in chart- now with linear shallow abrasions/lacerations on left forearm  No need for sutures, encourage local cleansing and nursing wound care as needed  Encourage patient to alert nurses when she experiences AH/thoughts of self-harm

## 2022-05-03 NOTE — PROGRESS NOTES
Progress Note - Behavioral Health   Leydi Otto 47 y o  female MRN: [id number]  Unit/Bed#: U 344-02 Encounter: [id number]    Assessment/Plan   Principal Problem:    Major depression with psychotic features ([facility] )  Active Problems:    Primary hypertension    Hyperlipidemia    Class 3 severe obesity due to excess calories without serious comorbidity in adult [facility])    Medical clearance for psychiatric admission    Seizures ([facility] )    Hyponatremia      Recommended Treatment:     1  Continue with medications:  a  Increase Prazosin 2mg HS for nightmares  b  Haldol 5mg daytime and 10mg HS for psychosis  c  Mirtazapine 30mg daily for depression  d  Depakote 2000mg HS for seizures  2  Continue participating in group therapy, milieu therapy and occupational therapy  3  Continue timely safety checks and vitals  4  Continue SLIM medical management  5  Continue coordinating with case management for disposition  6  Follow up BMP tomorrow    Case discussed with treatment team   Risks, benefits and possible side effects of Medications: Risks, benefits, and possible side effects of medications have been explained to the patient, who verbalizes understanding    Legal Status: 201  Diet: Regular    ------------------------------------------------------------    Subjective: Per nursing report, Leydi has been cooperative on the unit and compliant with medications  Yesterday afternoon, she approached the nursing station during group to report that her hand was numb  I approached and spoke with her as well, and she reported she could not feel light touch on any of her digits or the palm of her hand  She said she was not sitting on her hand and just had it resting on the table  Medical came and evaluated her later in the afternoon and concluded it was most likely something benign such as carpal tunnel syndrome, and to recommended to continue monitoring her symptoms  Her Valproate level redrawn yesterday evening was 43 8   She also inquired about the purpose of prazosin, which was explained to her by one of the staff members  On evaluation, Leydi is cooperative, though outwardly exhibiting signs of depression, more so than previous days  She states her mood is "depressed " She reports sleeping poorly, having trouble falling asleep and getting about 4 hours  She also reports the same recurring nightmares last night as previous  Her energy level today is low  During the majority of the interview, she is slumped in her chair with her head down and eyes closed  Her appetite has been poor, reporting that she ate some breakfast but not as much as usual  She said she spoke with her boyfriend again yesterday and that made her feel a little better  She does not know why her depression is increasing, but states that she is stressed about not being able to see her boyfriend  She was able to elaborate about what her boyfriend and her do at [facility] together, including watching comedy movies and doing word searches  When asked if her haldol was affecting the voices she hears, she said it does not  She said they have not changed, they still tell her to hurt herself, and she was hearing them during our interview  She denies any adverse effects from medications  Her goals for today are to attend groups and to meet with her therapist     Today, Leydi is layo for safety on the unit  She endorses suidcidal ideation with no plan, and denies homicidal ideation and visual hallucinations  She denies paranoid thoughts or ideation      PRNs overnight:   - Zyprexa 10mg 5/2/22 @ 0805     VS: Reviewed, within normal limits    Progress Toward Goals: slight regression    Psychiatric Review of Systems:  Behavior over the last 24 hours: regressed  Sleep: insomnia; slept 4 hours last night, continues to have nightmares  Appetite: adequate, decreased from baseline  Medication side effects: none verbalized  Medical ROS: Complete review of systems is negative except as noted above      Vital signs in last 24 hours:  Temp:  [98 °F (36 7 °C)-98 3 °F (36 8 °C)] 98 °F (36 7 °C)  HR:  [89-95] 95  Resp:  [16] 16  BP: (115-138)/(60-78) 135/76    Mental Status Exam:  Appearance:  alert, appears stated age, casually dressed, appropriate grooming and hygiene, obese and overtly appearing  female   Behavior:  calm, cooperative and slouching in chair, limited-no eye contact with eyes closed or in downward gaze   Motor: normal gait and balance and psychomotor slowing   Speech:  spontaneous, clear, scant, coherent and quick to answer   Mood:  "depressed"   Affect:  constricted and depressed   Thought Process:  organized, logical, perseverative with regards to her depression   Thought Content: no verbalized delusions or overt paranoia   Perceptual disturbances: does not appear to be responding to internal stimuli at this time and command auditory hallucinations to harm herself, denies visual hallucinations   Risk Potential: endorses suicidal ideation with no plan, denies homicidal ideation, contracts for safety on unit   Cognition: cognition not formally tested   Insight:  Limited   Judgment: Limited     Current Medications:  Current Facility-Administered Medications   Medication Dose Route Frequency Provider Last Rate    acetaminophen  650 mg Oral Q6H PRN Jane Carlin MD      acetaminophen  650 mg Oral Q4H PRN Jane Carlin MD      acetaminophen  975 mg Oral Q6H PRN Jane Carlin MD      aluminum-magnesium hydroxide-simethicone  30 mL Oral Q4H PRN Jane Carlin MD      artificial tear  1 application Both Eyes R3D PRN Jane Carlin MD      aspirin  81 mg Oral Daily Jane Carlin MD      atorvastatin  40 mg Oral Daily With Jake Balderas MD      benztropine  1 mg Intramuscular BID PRN Jane Carlin MD      benztropine  1 mg Oral BID PRN Jane Carlin MD      cholecalciferol  1,000 Units Oral Daily Sohail Ferrell MD      hydrOXYzine HCL  50 mg Oral Q6H PRN Max 4/day Sohail Ferrell MD      Or    diphenhydrAMINE  50 mg Intramuscular Q6H PRN Sohail Ferrell MD      divalproex sodium  2,000 mg Oral HS Sohail Ferrell MD      haloperidol           haloperidol  10 mg Oral HS Chiqui Finn MD      haloperidol  5 mg Oral Daily Chiqui Finn MD      hydrOXYzine HCL  100 mg Oral Q6H PRN Max 4/day Sohail Ferrell MD      Or    LORazepam  2 mg Intramuscular Q6H PRN Sohail Ferrell MD      hydrOXYzine HCL  25 mg Oral Q6H PRN Max 4/day Sohail Ferrell MD      ibuprofen  600 mg Oral Q6H PRN Renetta Hadley PA-C      levOCARNitine  330 mg/day Oral QAM Sohail Ferrell MD      lisinopril  20 mg Oral Daily Sohail Ferrell MD      loperamide  2 mg Oral TID PRN Lisa Courtney MD      melatonin  3 mg Oral HS PRN Sohail Ferrell MD      mirtazapine  30 mg Oral HS Chiqui Finn MD      OLANZapine  10 mg Oral Q3H PRN Max 3/day Sohail Ferrell MD      Or    OLANZapine  10 mg Intramuscular Q3H PRN Max 3/day Sohail Ferrell MD      OLANZapine  5 mg Oral Q3H PRN Max 6/day Sohail Ferrell MD      Or    OLANZapine  5 mg Intramuscular Q3H PRN Max 6/day Sohail Ferrell MD      OLANZapine  2 5 mg Oral Q3H PRN Max 8/day Sohail Ferrell MD      ondansetron  4 mg Oral Q6H PRN JASMYN Oropeza-C      polyethylene glycol  17 g Oral Daily PRN Sohail Ferrell MD      prazosin  1 mg Oral HS Sohail Ferrell MD      propranolol  10 mg Oral Q8H PRN Sohail Ferrell MD      senna-docusate sodium  1 tablet Oral BID Derwin Hammans, [place] Medications: all current active meds have been reviewed  Changes as in plan section above  Laboratory results:  I have personally reviewed all pertinent laboratory/tests results     Recent Results (from the past 48 hour(s)) Basic metabolic panel    Collection Time: 05/02/22  6:47 AM   Result Value Ref Range    Sodium 136 (L) 137 - 147 mmol/L    Potassium 3 5 (L) 3 6 - 5 0 mmol/L    Chloride 100 97 - 108 mmol/L    CO2 29 22 - 30 mmol/L    ANION GAP 7 5 - 14 mmol/L    BUN 8 5 - 25 mg/dL    Creatinine 0 49 (L) 0 60 - 1 20 mg/dL    Glucose 105 (H) 70 - 99 mg/dL    Glucose, Fasting 105 (H) 70 - 99 mg/dL    Calcium 8 5 8 4 - 10 2 mg/dL    eGFR 113 ml/min/1 73sq m   Valproic acid level, total    Collection Time: 05/02/22  9:13 PM   Result Value Ref Range    Valproic Acid, Total 43 8 (L) 50 - 120 ug/mL        This note has been constructed using a voice recognition system  There may be translation, syntax, or grammatical errors  If you have any questions, please contact the dictating author      [address] Sellers 7 Student  MS-IV

## 2022-05-03 NOTE — PROGRESS NOTES
05/03/22 1059   Team Meeting   Meeting Type Daily Rounds   Team Members Present   Team Members Present Physician;Nurse;   Physician Team Member Dr Sri Burgess Team Member [id number] Management Team Member Mamadou   Patient/Family Present   Patient Present No   Patient's Family Present No   Pt is medication compliant  Pt continues to report AH  Pt's Haldol was increased  Discharge to be determined

## 2022-05-04 LAB
ANION GAP SERPL CALCULATED.3IONS-SCNC: 5 MMOL/L (ref 5–14)
BUN SERPL-MCNC: 5 MG/DL (ref 5–25)
CALCIUM SERPL-MCNC: 8.7 MG/DL (ref 8.4–10.2)
CHLORIDE SERPL-SCNC: 100 MMOL/L (ref 97–108)
CO2 SERPL-SCNC: 32 MMOL/L (ref 22–30)
CREAT SERPL-MCNC: 0.46 MG/DL (ref 0.6–1.2)
GFR SERPL CREATININE-BSD FRML MDRD: 115 ML/MIN/1.73SQ M
GLUCOSE P FAST SERPL-MCNC: 94 MG/DL (ref 70–99)
GLUCOSE SERPL-MCNC: 94 MG/DL (ref 70–99)
POTASSIUM SERPL-SCNC: 3.3 MMOL/L (ref 3.6–5)
SODIUM SERPL-SCNC: 137 MMOL/L (ref 137–147)

## 2022-05-04 PROCEDURE — 99232 SBSQ HOSP IP/OBS MODERATE 35: CPT | Performed by: PSYCHIATRY & NEUROLOGY

## 2022-05-04 PROCEDURE — 80048 BASIC METABOLIC PNL TOTAL CA: CPT

## 2022-05-04 RX ORDER — POTASSIUM CHLORIDE 20 MEQ/1
40 TABLET, EXTENDED RELEASE ORAL ONCE
Status: COMPLETED | OUTPATIENT
Start: 2022-05-04 | End: 2022-05-04

## 2022-05-04 RX ORDER — HALOPERIDOL 5 MG
5 TABLET ORAL ONCE
Status: COMPLETED | OUTPATIENT
Start: 2022-05-04 | End: 2022-05-04

## 2022-05-04 RX ORDER — HALOPERIDOL 10 MG/1
10 TABLET ORAL DAILY
Status: DISCONTINUED | OUTPATIENT
Start: 2022-05-05 | End: 2022-05-06

## 2022-05-04 RX ADMIN — LEVOCARNITINE 330 MG: 1 SOLUTION ORAL at 08:38

## 2022-05-04 RX ADMIN — HALOPERIDOL 10 MG: 10 TABLET ORAL at 21:18

## 2022-05-04 RX ADMIN — HALOPERIDOL 5 MG: 5 TABLET ORAL at 08:38

## 2022-05-04 RX ADMIN — HALOPERIDOL 5 MG: 5 TABLET ORAL at 13:08

## 2022-05-04 RX ADMIN — SENNOSIDES AND DOCUSATE SODIUM 1 TABLET: 50; 8.6 TABLET ORAL at 08:38

## 2022-05-04 RX ADMIN — MIRTAZAPINE 30 MG: 30 TABLET, FILM COATED ORAL at 21:17

## 2022-05-04 RX ADMIN — PRAZOSIN HYDROCHLORIDE 2 MG: 1 CAPSULE ORAL at 21:17

## 2022-05-04 RX ADMIN — LISINOPRIL 20 MG: 10 TABLET ORAL at 08:38

## 2022-05-04 RX ADMIN — DIVALPROEX SODIUM 2000 MG: 500 TABLET, EXTENDED RELEASE ORAL at 21:17

## 2022-05-04 RX ADMIN — Medication 1000 UNITS: at 08:38

## 2022-05-04 RX ADMIN — POTASSIUM CHLORIDE 40 MEQ: 20 TABLET, EXTENDED RELEASE ORAL at 13:08

## 2022-05-04 RX ADMIN — ASPIRIN 81 MG CHEWABLE TABLET 81 MG: 81 TABLET CHEWABLE at 08:37

## 2022-05-04 RX ADMIN — ATORVASTATIN CALCIUM 40 MG: 40 TABLET, FILM COATED ORAL at 17:27

## 2022-05-04 RX ADMIN — SENNOSIDES AND DOCUSATE SODIUM 1 TABLET: 50; 8.6 TABLET ORAL at 17:27

## 2022-05-04 NOTE — NURSING NOTE
Patient was on the phone with her boyfriend Melissa Peralta several times this evening - he is also admitted to in pt  "Same reason as me "  She has been asking for a blanket to sleep with on her bed overnight  Explained to patient this was not possible since she has paper linens following the failure to follow through on communicating as she had said she contracted for safety - instead she had engaged in self mutilative behavior this evening and the protocol for safety is being followed

## 2022-05-04 NOTE — NURSING NOTE
Pt visible on unit interacting with patients and staff during shift  Reports feeling "depressed," affect is constricted and incongruent to reported mood  States she is hearing voices telling her to harm herself, contracts for safety at this time  Assessment ongoing

## 2022-05-04 NOTE — PROGRESS NOTES
Progress Note - Behavioral Health   Leydi Torres 47 y o  female MRN: [id number]  Unit/Bed#: U 344-02 Encounter: [id number]    Assessment/Plan   Principal Problem:    Major depression with psychotic features ([facility] )  Active Problems:    Primary hypertension    Hyperlipidemia    Class 3 severe obesity due to excess calories without serious comorbidity in [facility])    Medical clearance for psychiatric admission    Seizures ([facility] )    Hyponatremia      Recommended Treatment:     1  Continue with medications:  a  Depakote 2000mg ER for seizures  b  Increase Haldol 10mg BID for psychosis  c  Remeron 30mg QHS for mood   d  Minipress 2mg QHS for nightmares  2  Continue with group therapy, milieu therapy and occupational therapy  3  Continue frequent safety checks and vitals per unit protocol  4  Continue with SLIM medical management as indicated  5  Continue coordinating with case management regarding disposition  6  Replete potassium 40meQ, K=3 3    a  Monitor BMP tomorrow     Case discussed with treatment team   Risks, benefits and possible side effects of Medications: Risks, benefits, and possible side effects of medications have previously been explained  No new medications at this time  Legal Status: 201  Diet: Regular    ------------------------------------------------------------    Subjective: Per nursing report, Leydi has been medication and meal compliant  She has been noted to be attending several groups  Yesterday afternoon patient endorsed auditory hallucinations and was given PRN zyprexa, and requested a book to distract herself from auditory hallucinations to hurt herself   Patient was given dinner and returned her tray to the cart, however she was able to obtain a plastic knife and brought this to her room to cut her arm because "the voices told [her] [she is] fat and ugly " Patient then immediately reported her actions to staff, and was placed on finger foods and paper scrub orders and her room was emptied  She was also given IM PRN Zyprexa  Last night patient reported to staff that her boyfriend had been admitted upstairs for similar presentation to patient  On evaluation, Leydi is calm and cooperative with evaluation, found coloring in the day room with a peer  She expressed excitement that said peer created a drawing of her name for her  She states her mood is "not so good " She reports sleeping "pretty well" for approximately 5 hours overnight  Her appetite has been adequate and she is currently on finger foods for safety  She endorses morning fatigue but otherwise denies any adverse effects from medications  Her goals for today are to "go to groups"  Today, Leydi is layo for safety on the unit  She endorses passive denies suicidal ideation and continued auditory hallucinations of her own voice saying derogatory things, but denies homicidal ideation and visual hallucinations  She denies paranoid thoughts or ideation  PRNs overnight: zyprexa 10mg PO at 1450, 10mg IM at 1750   VS: Reviewed, hypertensive to 176/83 at 0827 today but on recheck was 127/62, otherwise within normal limits    Progress Toward Goals: No improvement    Psychiatric Review of Systems:  Behavior over the last 24 hours: regressed  Sleep: improving  Appetite: adequate  Medication side effects: as noted above  Medical ROS: Complete review of systems is negative except as noted above      Vital signs in last 24 hours:  Temp:  [97 1 °F (36 2 °C)-97 8 °F (36 6 °C)] 97 1 °F (36 2 °C)  HR:  [80-97] 96  Resp:  [16-18] 18  BP: (112-176)/(59-83) 176/83    Mental Status Exam:  Appearance:  alert, good eye contact, appears stated age, disheveled and wearing paper scrubs, obese, overtly appearing  female   Behavior:  calm and cooperative   Motor: no abnormal movements and normal gait and balance   Speech:  spontaneous, normal rate, normal volume, scant and coherent   Mood:  "not so good"   Affect:  constricted   Thought Process: organized, perseverative   Thought Content: no verbalized delusions or overt paranoia   Perceptual disturbances: auditory hallucinations of derogatory statements, denies visual hallucinations and does not appear to be responding to internal stimuli at this time   Risk Potential: endorses SI without plan, contracts for safety, denies HI   Cognition: cognition not formally tested   Insight:  Limited   Judgment: Limited     Current Medications:  Current Facility-Administered Medications   Medication Dose Route Frequency Provider Last Rate    acetaminophen  650 mg Oral Q6H PRN Neida Florian, MD      acetaminophen  650 mg Oral Q4H PRN Neida Stake, MD      acetaminophen  975 mg Oral Q6H PRN Neida Stake, MD      aluminum-magnesium hydroxide-simethicone  30 mL Oral Q4H PRN Neida Stake, MD      artificial tear  1 application Both Eyes D3A PRN Neidanegro Du, MD      aspirin  81 mg Oral Daily Neida MD Florian      atorvastatin  40 mg Oral Daily With Hi Alcala MD      benztropine  1 mg Intramuscular BID PRN Neida MD Florian      benztropine  1 mg Oral BID PRN Neida MD Florian      cholecalciferol  1,000 Units Oral Daily Neida Du MD      hydrOXYzine HCL  50 mg Oral Q6H PRN Max 4/day Neida Du MD      Or    diphenhydrAMINE  50 mg Intramuscular Q6H PRN Neida Du MD      divalproex sodium  2,000 mg Oral HS Neida Du MD      haloperidol           haloperidol  10 mg Oral HS MD Ben Rutledge ON 5/5/2022] haloperidol  10 mg Oral Daily Neida Du MD      hydrOXYzine HCL  100 mg Oral Q6H PRN Max 4/day Neida Du MD      Or    LORazepam  2 mg Intramuscular Q6H PRN Neida Du MD      hydrOXYzine HCL  25 mg Oral Q6H PRN Max 4/day eNida Du MD      ibuprofen  600 mg Oral Q6H PRN Boswell JO Perez      levOCARNitine  330 mg/day Oral QAM Mariaelena Serrato MD      lisinopril  20 mg Oral Daily Mariaelena Serrato, MD      loperamide  2 mg Oral TID PRN Gurpreet Qiu MD      melatonin  3 mg Oral HS PRN Mariaelena Serrato, MD      mirtazapine  30 mg Oral HS Dario Voss MD      OLANZapine  10 mg Oral Q3H PRN Max 3/day Mariaelena Serrato, MD      Or    OLANZapine  10 mg Intramuscular Q3H PRN Max 3/day Mariaelena Serrato, MD      OLANZapine  5 mg Oral Q3H PRN Max 6/day Mariaelena Serrato, MD      Or    OLANZapine  5 mg Intramuscular Q3H PRN Max 6/day Mariaelena Serrato, MD      OLANZapine  2 5 mg Oral Q3H PRN Max 8/day Mariaelena Serrato, MD      ondansetron  4 mg Oral Q6H PRN Maria G Godinez PA-C      polyethylene glycol  17 g Oral Daily PRN Mariaelena Serrato, MD      prazosin  2 mg Oral HS Mariaelena Serrato, MD      propranolol  10 mg Oral Q8H PRN Mariaelena Serrato, MD      senna-docusate sodium  1 tablet Oral BID Az Rose Medications: all current active meds have been reviewed  Changes as in plan section above  Laboratory results:  I have personally reviewed all pertinent laboratory/tests results  Recent Results (from the past 48 hour(s))   Valproic acid level, total    Collection Time: 05/02/22  9:13 PM   Result Value Ref Range    Valproic Acid, Total 43 8 (L) 50 - 120 ug/mL   Basic metabolic panel    Collection Time: 05/04/22  5:51 AM   Result Value Ref Range    Sodium 137 137 - 147 mmol/L    Potassium 3 3 (L) 3 6 - 5 0 mmol/L    Chloride 100 97 - 108 mmol/L    CO2 32 (H) 22 - 30 mmol/L    ANION GAP 5 5 - 14 mmol/L    BUN 5 5 - 25 mg/dL    Creatinine 0 46 (L) 0 60 - 1 20 mg/dL    Glucose 94 70 - 99 mg/dL    Glucose, Fasting 94 70 - 99 mg/dL    Calcium 8 7 8 4 - 10 2 mg/dL    eGFR 115 ml/min/1 73sq m        This note has been constructed using a voice recognition system  There may be translation, syntax, or grammatical errors   If you have any questions, please contact the dictating author      Aliya Bustos MD

## 2022-05-04 NOTE — CMS CERTIFICATION NOTE
Certification: Based upon physical, mental and social evaluations, I certify that inpatient psychiatric services are medically necessary for this patient for a duration of 7 midnights for the treatment of major depressive disorder with psychotic features  Available alternative community resources do not meet the patient's mental health care needs  I further attest that an established written individualized plan of care has been implemented and is outlined in the patient's medical records

## 2022-05-04 NOTE — NURSING NOTE
Patient is calm and cooperative, medication and meal compliant  Patient currently endorses SI, no plan, however states she will not make any further attempts to harm herself while in our care as she is uncomfortable in the paper scrubs and wishes to be off finger foods  Patient did not appear to have insight regarding this RNs explanation of her safety necessity with the paper scrubs but agreed to be safe      Patient denies HI    Patient endorsed AH and VH however would not elaborate

## 2022-05-04 NOTE — PROGRESS NOTES
05/04/22 1000   Activity/Group Checklist   Group Other (Comment)  (Group Art Therapy/Psychodynamic, Bilateral Exploration)   Attendance Attended   Attendance Duration (min) Greater than 60   Interactions Interacted appropriately   Affect/Mood Appropriate   Goals Achieved Able to listen to others; Able to engage in interactions; Able to recieve feedback; Able to give feedback to another  (Able to engage materials; full participation)

## 2022-05-04 NOTE — PROGRESS NOTES
BELL Group Note     05/04/22 1430   Activity/Group Checklist   Group Life Skills  (Positive Vs  Negative Statements)   Attendance Attended   Attendance Duration (min) 46-60   Interactions Interacted appropriately  (verbally scant/limited participation)   Affect/Mood Appropriate;Calm   Goals Achieved Identified feelings; Able to listen to others; Able to engage in interactions; Able to recieve feedback; Able to give feedback to another  (received resources/benefited from social presence of group)

## 2022-05-04 NOTE — SOCIAL WORK
Worker provided update to group home   Worker advised Pt had one episode of SIB the previous evening  Group home CM advised "she does that here often" and again asked about keeping LOS short  Worker advised Pt is still expressing SI and AH and advised this will need to be further stabilized prior to discharge  CM aware and in agreement with treatment team recommendations

## 2022-05-04 NOTE — PLAN OF CARE
Problem: Alteration in Thoughts and Perception  Goal: Agree to be compliant with medication regime, as prescribed and report medication side effects  Description: Interventions:  - Offer appropriate PRN medication and supervise ingestion; conduct AIMS, as needed   Outcome: Progressing     Problem: Risk for Self Injury/Neglect  Goal: Verbalize thoughts and feelings  Description: Interventions:  - Assess and re-assess patient's lethality and potential for self-injury  - Engage patient in 1:1 interactions, daily, for a minimum of 15 minutes  - Encourage patient to express feelings, fears, frustrations, hopes  - Establish rapport/trust with patient   Outcome: Progressing  Goal: Refrain from harming self  Description: Interventions:  - Monitor patient closely, per order  - Develop a trusting relationship  - Supervise medication ingestion, monitor effects and side effects   Outcome: Progressing     Problem: Depression  Goal: Refrain from isolation  Description: Interventions:  - Develop a trusting relationship   - Encourage socialization   Outcome: Progressing     Problem: Alteration in Orientation  Goal: Express concerns related to confused thinking related to:  Description: Interventions:  - Encourage patient to express feelings, fears, frustrations, hopes  - Assign consistent caregivers   - [place]/re-orient patient as needed  - Allow comfort items, as appropriate  - Provide visual cues, signs, etc    Outcome: Progressing  Goal: Cooperate with recommended testing/procedures  Description: Interventions:  - Determine need for ancillary testing  - Observe for mental status changes  - Implement falls/precaution protocol   Outcome: Progressing     Problem: DISCHARGE PLANNING - CARE MANAGEMENT  Goal: Discharge to post-acute care or home with appropriate resources  Description: INTERVENTIONS:  - Conduct assessment to determine patient/family and health care team treatment goals, and need for post-acute services based on payer coverage, community resources, and patient preferences, and barriers to discharge  - Address psychosocial, clinical, and financial barriers to discharge as identified in assessment in conjunction with the patient/family and health care team  - Arrange appropriate level of post-acute services according to patients   needs and preference and payer coverage in collaboration with the physician and health care team  - Communicate with and update the patient/family, physician, and health care team regarding progress on the discharge plan  - Arrange appropriate transportation to post-acute venues  Outcome: Progressing

## 2022-05-04 NOTE — PROGRESS NOTES
05/04/22 0934   Team Meeting   Meeting Type Daily Rounds   Team Members Present   Team Members Present Physician;Nurse;   Physician Team Member Dr Richard Horn Team Member [facility] Management Team Member Mamadou   Patient/Family Present   Patient Present No   Patient's Family Present No   Pt is medication compliant  Pt reported an incident of SIB by cutting herself with a knife to staff  Pt was given IM Zyprexa and received paper scrubs  Pt continues to report +AH  Further titration of Pt's medication was discussed  Discharge to be determined

## 2022-05-05 LAB
ANION GAP SERPL CALCULATED.3IONS-SCNC: 8 MMOL/L (ref 5–14)
BUN SERPL-MCNC: 9 MG/DL (ref 5–25)
CALCIUM SERPL-MCNC: 8.8 MG/DL (ref 8.4–10.2)
CHLORIDE SERPL-SCNC: 103 MMOL/L (ref 97–108)
CO2 SERPL-SCNC: 27 MMOL/L (ref 22–30)
CREAT SERPL-MCNC: 0.4 MG/DL (ref 0.6–1.2)
GFR SERPL CREATININE-BSD FRML MDRD: 120 ML/MIN/1.73SQ M
GLUCOSE P FAST SERPL-MCNC: 91 MG/DL (ref 70–99)
GLUCOSE SERPL-MCNC: 91 MG/DL (ref 70–99)
POTASSIUM SERPL-SCNC: 4.4 MMOL/L (ref 3.6–5)
SODIUM SERPL-SCNC: 138 MMOL/L (ref 137–147)

## 2022-05-05 PROCEDURE — 99232 SBSQ HOSP IP/OBS MODERATE 35: CPT | Performed by: PSYCHIATRY & NEUROLOGY

## 2022-05-05 PROCEDURE — 80048 BASIC METABOLIC PNL TOTAL CA: CPT

## 2022-05-05 RX ORDER — DIVALPROEX SODIUM 500 MG/1
2500 TABLET, EXTENDED RELEASE ORAL
Status: DISCONTINUED | OUTPATIENT
Start: 2022-05-05 | End: 2022-05-09 | Stop reason: HOSPADM

## 2022-05-05 RX ADMIN — SENNOSIDES AND DOCUSATE SODIUM 1 TABLET: 50; 8.6 TABLET ORAL at 08:23

## 2022-05-05 RX ADMIN — MIRTAZAPINE 30 MG: 30 TABLET, FILM COATED ORAL at 21:27

## 2022-05-05 RX ADMIN — LEVOCARNITINE 330 MG: 1 SOLUTION ORAL at 08:24

## 2022-05-05 RX ADMIN — HALOPERIDOL 10 MG: 10 TABLET ORAL at 21:26

## 2022-05-05 RX ADMIN — Medication 1000 UNITS: at 08:23

## 2022-05-05 RX ADMIN — ASPIRIN 81 MG CHEWABLE TABLET 81 MG: 81 TABLET CHEWABLE at 08:23

## 2022-05-05 RX ADMIN — DIVALPROEX SODIUM 2500 MG: 500 TABLET, EXTENDED RELEASE ORAL at 21:27

## 2022-05-05 RX ADMIN — ATORVASTATIN CALCIUM 40 MG: 40 TABLET, FILM COATED ORAL at 17:00

## 2022-05-05 RX ADMIN — LISINOPRIL 20 MG: 10 TABLET ORAL at 08:23

## 2022-05-05 RX ADMIN — SENNOSIDES AND DOCUSATE SODIUM 1 TABLET: 50; 8.6 TABLET ORAL at 17:54

## 2022-05-05 RX ADMIN — PRAZOSIN HYDROCHLORIDE 2 MG: 1 CAPSULE ORAL at 21:27

## 2022-05-05 RX ADMIN — HALOPERIDOL 10 MG: 10 TABLET ORAL at 08:24

## 2022-05-05 NOTE — NURSING NOTE
Patient appears depressed and disheveled  She denies feeling suicidal  Complaint with medications and unit routine  Pleasant and cooperative  Does not report any unmet needs

## 2022-05-05 NOTE — PROGRESS NOTES
BELL Group Note     05/05/22 7984   Activity/Group Checklist   Group Life Skills  (Core Beliefs/Perspectives)   Attendance Attended   Attendance Duration (min) 31-45   Interactions Interacted appropriately   Affect/Mood Appropriate;Calm   Goals Achieved Identified feelings; Discussed coping strategies; Discussed self-esteem issues; Able to listen to others; Able to engage in interactions; Able to reflect/comment on own behavior;Able to self-disclose; Able to recieve feedback; Able to give feedback to another

## 2022-05-05 NOTE — PROGRESS NOTES
Progress Note - Behavioral Health   Leydi Hay 47 y o  female MRN: [id number]  Unit/Bed#: U 344-02 Encounter: [id number]    Assessment/Plan   Principal Problem:    Major depression with psychotic features ([facility] [address] )  Active Problems:    Primary hypertension    Hyperlipidemia    Class 3 severe obesity due to excess calories without serious comorbidity in adult [facility])    Medical clearance for psychiatric admission    Seizures ([facility] [address] )    Hyponatremia      Recommended Treatment:     1  Continue with medications:  a  Increase Depakote to 2500mg ER HS for seizure disorder, and mood  i  Level on 5/2/22 = 43 8  b  Haldol 10mg BID for psychosis  c  Remeron 30mg QHS for mood  d  Minipress 2mg QHS for nightmares   2  Continue with group therapy, milieu therapy and occupational therapy  3  Continue frequent safety checks and vitals per unit protocol  4  Continue with SLIM medical management as indicated  5  Continue coordinating with case management regarding disposition  6  BMP today K =4 4 and Na = 138, within normal limits  7  Discontiniued finger foods and placed patient on regular diet  If remains in behavioral control, can discontinue paper scrubs tomorrow  Case discussed with treatment team   Risks, benefits and possible side effects of Medications: Risks, benefits, and possible side effects of medications have previously been explained  No new medications at this time  Legal Status: 201  Diet: Regular    ------------------------------------------------------------    Subjective: Per nursing report, Leydi has been cooperative on the unit and compliant with medications  Yesterday, case management updated patient's group home regarding self-injurious behavior on 5/3  Group home case management reported that Leydi "does that often" at her group home  She attended life skills group in the evening  Overnight she had no behavioral events and remained in behavioral control   Per treatment team discussion, Leydi repeatedly has been asking about being taken off of finger foods, and has since been denying auditory hallucinations and suicidal ideations  On evaluation, Leydi is calm and cooperative with evaluation and is more talkative, perseverative about being taken off of finger foods  She states her mood is "great " She reports sleeping "all night" without nightmares, and her energy level today is improved  Her appetite has been adequate and she reports having [place] toast and chocolate milk for breakfast  She denies any adverse effects from medications  Her goals for today are to attend groups and to "get off finger foods"  Today, Leydi denies suicidal ideation, homicidal ideation, auditory hallucinations, and visual hallucinations  Discussed layo for safety if patient began having worsening suicidal ideation or auditory hallucinations, and patient is in agreement, and is layo for safety  PRNs overnight: none   VS: Reviewed, within normal limits    Progress Toward Goals: Slow improvement    Psychiatric Review of Systems:  Behavior over the last 24 hours: improved  Sleep: normal  Appetite: adequate  Medication side effects: none verbalized  Medical ROS: Complete review of systems is negative except as noted above      Vital signs in last 24 hours:  Temp:  [98 °F (36 7 °C)] 98 °F (36 7 °C)  HR:  [75-91] 91  Resp:  [16] 16  BP: (112-159)/(57-70) 159/70    Mental Status Exam:  Appearance:  alert, good eye contact, appears stated age, disheveled and wearing paper scrubs, overtly appearing  female   Behavior:  calm and cooperative   Motor: restless and fidgety and normal gait and balance   Speech:  clear, coherent and normal volume, decreased latency to respond   Mood:  "great"   Affect:  constricted   Thought Process:  generally linear and goal-directed but perseverative at times   Thought Content: no verbalized delusions or overt paranoia   Perceptual disturbances: no reported hallucinations, denies current hallucinations and does not appear to be responding to internal stimuli at this time   Risk Potential: No active or passive suicidal or homicidal ideation was verbalized during interview   Cognition: cognition not formally tested   Insight:  Limited   Judgment: Limited     Current Medications:  Current Facility-Administered Medications   Medication Dose Route Frequency Provider Last Rate    acetaminophen  650 mg Oral Q6H PRN Magno Osman MD      acetaminophen  650 mg Oral Q4H PRN Magno Osman MD      acetaminophen  975 mg Oral Q6H PRN Magno Osman MD      aluminum-magnesium hydroxide-simethicone  30 mL Oral Q4H PRN Magno Osman MD      artificial tear  1 application Both Eyes S8V PRN Magno Osman MD      aspirin  81 mg Oral Daily Magno Osman MD      atorvastatin  40 mg Oral Daily With Azar Kulkarni MD      benztropine  1 mg Intramuscular BID PRN Magno Osman MD      benztropine  1 mg Oral BID PRN Magno Osman MD      cholecalciferol  1,000 Units Oral Daily Magno Osman MD      hydrOXYzine HCL  50 mg Oral Q6H PRN Max 4/day Magno Osman MD      Or    diphenhydrAMINE  50 mg Intramuscular Q6H PRN Magno Osman MD      divalproex sodium  2,500 mg Oral HS Magno Osman MD      haloperidol           haloperidol  10 mg Oral HS Amirah Isaacs MD      haloperidol  10 mg Oral Daily Magno Osman MD      hydrOXYzine HCL  100 mg Oral Q6H PRN Max 4/day Magno Osman MD      Or    LORazepam  2 mg Intramuscular Q6H PRN Magno Osman MD      hydrOXYzine HCL  25 mg Oral Q6H PRN Max 4/day Magno Osman MD      ibuprofen  600 mg Oral Q6H PRN Janie Garcia PA-C      levOCARNitine  330 mg/day Oral QAM Magno Osman MD      lisinopril  20 mg Oral Daily Magno Osman MD      loperamide  2 mg Oral TID PRN Nolan Lamar MD      melatonin  3 mg Oral HS PRN Jean Peguero MD      mirtazapine  30 mg Oral HS Marbella Yao MD      OLANZapine  10 mg Oral Q3H PRN Max 3/day Jean Peguero MD      Or    OLANZapine  10 mg Intramuscular Q3H PRN Max 3/day Jean Peguero MD      OLANZapine  5 mg Oral Q3H PRN Max 6/day Jean Peguero MD      Or    OLANZapine  5 mg Intramuscular Q3H PRN Max 6/day Jean Peguero MD      OLANZapine  2 5 mg Oral Q3H PRN Max 8/day Jean Peguero MD      ondansetron  4 mg Oral Q6H PRN Mary Pascual PA-C      polyethylene glycol  17 g Oral Daily PRN Jean Peguero MD      prazosin  2 mg Oral HS Jean Peguero MD      propranolol  10 mg Oral Q8H PRN Jean Peguero MD      senna-docusate sodium  1 tablet Oral BID [facility] Medications: all current active meds have been reviewed  Changes as in plan section above  Laboratory results:  I have personally reviewed all pertinent laboratory/tests results     Recent Results (from the past 48 hour(s))   Basic metabolic panel    Collection Time: 05/04/22  5:51 AM   Result Value Ref Range    Sodium 137 137 - 147 mmol/L    Potassium 3 3 (L) 3 6 - 5 0 mmol/L    Chloride 100 97 - 108 mmol/L    CO2 32 (H) 22 - 30 mmol/L    ANION GAP 5 5 - 14 mmol/L    BUN 5 5 - 25 mg/dL    Creatinine 0 46 (L) 0 60 - 1 20 mg/dL    Glucose 94 70 - 99 mg/dL    Glucose, Fasting 94 70 - 99 mg/dL    Calcium 8 7 8 4 - 10 2 mg/dL    eGFR 115 ml/min/1 73sq m   Basic metabolic panel    Collection Time: 05/05/22  6:20 AM   Result Value Ref Range    Sodium 138 137 - 147 mmol/L    Potassium 4 4 3 6 - 5 0 mmol/L    Chloride 103 97 - 108 mmol/L    CO2 27 22 - 30 mmol/L    ANION GAP 8 5 - 14 mmol/L    BUN 9 5 - 25 mg/dL    Creatinine 0 40 (L) 0 60 - 1 20 mg/dL    Glucose 91 70 - 99 mg/dL    Glucose, Fasting 91 70 - 99 mg/dL    Calcium 8 8 8 4 - 10 2 mg/dL    eGFR 120 ml/min/1 73sq m        This note has been constructed using a voice recognition system  There may be translation, syntax, or grammatical errors  If you have any questions, please contact the dictating author      Bonifacio Rice MD

## 2022-05-05 NOTE — PROGRESS NOTES
05/05/22 1006   Team Meeting   Meeting Type Daily Rounds   Team Members Present   Team Members Present Physician;Nurse;   Physician Team Member Dr Sherrie Singer Team Member [facility] Management Team Member Mamadou   Patient/Family Present   Patient Present No   Patient's Family Present No   Pt is medication compliant  Pt denies symptoms and Pt's haldol was increased  Discharge to be determined

## 2022-05-05 NOTE — PLAN OF CARE
Problem: Alteration in Thoughts and Perception  Goal: Refrain from acting on delusional thinking/internal stimuli  Description: Interventions:  - Monitor patient closely, per order   - Utilize least restrictive measures   - Set reasonable limits, give positive feedback for acceptable   - Administer medications as ordered and monitor of potential side effects  Outcome: Progressing  Goal: Agree to be compliant with medication regime, as prescribed and report medication side effects  Description: Interventions:  - Offer appropriate PRN medication and supervise ingestion; conduct AIMS, as needed   Outcome: Progressing     Problem: Ineffective Coping  Goal: Participates in unit activities  Description: Interventions:  - Provide therapeutic environment   - Provide required programming   - Redirect inappropriate behaviors   Outcome: Progressing     Problem: Risk for Self Injury/Neglect  Goal: Verbalize thoughts and feelings  Description: Interventions:  - Assess and re-assess patient's lethality and potential for self-injury  - Engage patient in 1:1 interactions, daily, for a minimum of 15 minutes  - Encourage patient to express feelings, fears, frustrations, hopes  - Establish rapport/trust with patient   Outcome: Progressing  Goal: Refrain from harming self  Description: Interventions:  - Monitor patient closely, per order  - Develop a trusting relationship  - Supervise medication ingestion, monitor effects and side effects   Outcome: Progressing     Problem: Depression  Goal: Refrain from isolation  Description: Interventions:  - Develop a trusting relationship   - Encourage socialization   Outcome: Progressing     Problem: Alteration in Orientation  Goal: Express concerns related to confused thinking related to:  Description: Interventions:  - Encourage patient to express feelings, fears, frustrations, hopes  - Assign consistent caregivers   - [place]/re-orient patient as needed  - Allow comfort items, as appropriate  - Provide visual cues, signs, etc    Outcome: Progressing  Goal: Cooperate with recommended testing/procedures  Description: Interventions:  - Determine need for ancillary testing  - Observe for mental status changes  - Implement falls/precaution protocol   Outcome: Progressing     Problem: DISCHARGE PLANNING - CARE MANAGEMENT  Goal: Discharge to post-acute care or home with appropriate resources  Description: INTERVENTIONS:  - Conduct assessment to determine patient/family and health care team treatment goals, and need for post-acute services based on payer coverage, community resources, and patient preferences, and barriers to discharge  - Address psychosocial, clinical, and financial barriers to discharge as identified in assessment in conjunction with the patient/family and health care team  - Arrange appropriate level of post-acute services according to patients   needs and preference and payer coverage in collaboration with the physician and health care team  - Communicate with and update the patient/family, physician, and health care team regarding progress on the discharge plan  - Arrange appropriate transportation to post-acute venues  Outcome: Progressing

## 2022-05-06 PROBLEM — K59.00 CONSTIPATION: Status: ACTIVE | Noted: 2022-05-06

## 2022-05-06 PROBLEM — G47.00 INSOMNIA: Status: ACTIVE | Noted: 2022-05-06

## 2022-05-06 PROCEDURE — 0241U HB NFCT DS VIR RESP RNA 4 TRGT: CPT

## 2022-05-06 PROCEDURE — 99232 SBSQ HOSP IP/OBS MODERATE 35: CPT | Performed by: PSYCHIATRY & NEUROLOGY

## 2022-05-06 RX ORDER — MIRTAZAPINE 30 MG/1
30 TABLET, FILM COATED ORAL
Qty: 30 TABLET | Refills: 0 | Status: ON HOLD | OUTPATIENT
Start: 2022-05-06 | End: 2022-05-17 | Stop reason: SDUPTHER

## 2022-05-06 RX ORDER — IBUPROFEN 600 MG/1
600 TABLET ORAL EVERY 6 HOURS PRN
Qty: 30 TABLET | Refills: 0 | Status: SHIPPED | OUTPATIENT
Start: 2022-05-06 | End: 2022-05-18

## 2022-05-06 RX ORDER — HALOPERIDOL 10 MG/1
10 TABLET ORAL 2 TIMES DAILY
Status: DISCONTINUED | OUTPATIENT
Start: 2022-05-06 | End: 2022-05-09 | Stop reason: HOSPADM

## 2022-05-06 RX ORDER — DIVALPROEX SODIUM 500 MG/1
2500 TABLET, EXTENDED RELEASE ORAL
Qty: 150 TABLET | Refills: 0 | Status: ON HOLD | OUTPATIENT
Start: 2022-05-06 | End: 2022-05-17 | Stop reason: SDUPTHER

## 2022-05-06 RX ORDER — AMOXICILLIN 250 MG
1 CAPSULE ORAL 2 TIMES DAILY
Qty: 60 TABLET | Refills: 0 | Status: ON HOLD | OUTPATIENT
Start: 2022-05-06 | End: 2022-06-24 | Stop reason: SDUPTHER

## 2022-05-06 RX ORDER — ASPIRIN 81 MG/1
81 TABLET, CHEWABLE ORAL DAILY
Qty: 30 TABLET | Refills: 0 | Status: ON HOLD | OUTPATIENT
Start: 2022-05-06 | End: 2022-05-17 | Stop reason: SDUPTHER

## 2022-05-06 RX ORDER — PRAZOSIN HYDROCHLORIDE 2 MG/1
2 CAPSULE ORAL
Qty: 30 CAPSULE | Refills: 0 | Status: ON HOLD | OUTPATIENT
Start: 2022-05-06 | End: 2022-05-17 | Stop reason: SDUPTHER

## 2022-05-06 RX ORDER — LANOLIN ALCOHOL/MO/W.PET/CERES
3 CREAM (GRAM) TOPICAL
Qty: 30 TABLET | Refills: 0 | Status: SHIPPED | OUTPATIENT
Start: 2022-05-06 | End: 2022-05-18

## 2022-05-06 RX ORDER — MELATONIN
1000 DAILY
Qty: 30 TABLET | Refills: 0 | Status: SHIPPED | OUTPATIENT
Start: 2022-05-06 | End: 2022-05-18

## 2022-05-06 RX ORDER — LEVOCARNITINE 1 G/10ML
330 SOLUTION ORAL DAILY
Qty: 118 ML | Refills: 0 | Status: SHIPPED | OUTPATIENT
Start: 2022-05-06 | End: 2022-05-18

## 2022-05-06 RX ORDER — LISINOPRIL 20 MG/1
20 TABLET ORAL DAILY
Qty: 30 TABLET | Refills: 0 | Status: ON HOLD | OUTPATIENT
Start: 2022-05-06 | End: 2022-05-17 | Stop reason: SDUPTHER

## 2022-05-06 RX ORDER — HALOPERIDOL 10 MG/1
10 TABLET ORAL 2 TIMES DAILY
Qty: 60 TABLET | Refills: 0 | Status: ON HOLD | OUTPATIENT
Start: 2022-05-06 | End: 2022-05-17 | Stop reason: SDUPTHER

## 2022-05-06 RX ORDER — ATORVASTATIN CALCIUM 40 MG/1
40 TABLET, FILM COATED ORAL
Qty: 30 TABLET | Refills: 0 | Status: ON HOLD | OUTPATIENT
Start: 2022-05-06 | End: 2022-05-17 | Stop reason: SDUPTHER

## 2022-05-06 RX ADMIN — SENNOSIDES AND DOCUSATE SODIUM 1 TABLET: 50; 8.6 TABLET ORAL at 17:49

## 2022-05-06 RX ADMIN — ATORVASTATIN CALCIUM 40 MG: 40 TABLET, FILM COATED ORAL at 16:16

## 2022-05-06 RX ADMIN — LISINOPRIL 20 MG: 10 TABLET ORAL at 08:04

## 2022-05-06 RX ADMIN — Medication 1000 UNITS: at 08:04

## 2022-05-06 RX ADMIN — HALOPERIDOL 10 MG: 10 TABLET ORAL at 21:59

## 2022-05-06 RX ADMIN — HALOPERIDOL 10 MG: 10 TABLET ORAL at 08:05

## 2022-05-06 RX ADMIN — ASPIRIN 81 MG CHEWABLE TABLET 81 MG: 81 TABLET CHEWABLE at 08:04

## 2022-05-06 RX ADMIN — LEVOCARNITINE 330 MG: 1 SOLUTION ORAL at 08:05

## 2022-05-06 RX ADMIN — PRAZOSIN HYDROCHLORIDE 2 MG: 1 CAPSULE ORAL at 21:58

## 2022-05-06 RX ADMIN — DIVALPROEX SODIUM 2500 MG: 500 TABLET, EXTENDED RELEASE ORAL at 21:58

## 2022-05-06 RX ADMIN — MIRTAZAPINE 30 MG: 30 TABLET, FILM COATED ORAL at 21:59

## 2022-05-06 RX ADMIN — SENNOSIDES AND DOCUSATE SODIUM 1 TABLET: 50; 8.6 TABLET ORAL at 08:04

## 2022-05-06 NOTE — PROGRESS NOTES
05/05/22 1000   Activity/Group Checklist   Group Other (Comment)  (Group Art Therapy/Studio-Based, [employer] Choice)   Attendance Attended   Attendance Duration (min) 46-60   Interactions Interacted appropriately   Affect/Mood Appropriate   Goals Achieved Able to listen to others; Able to engage in interactions; Able to recieve feedback  (Able to engage materials; full participation)

## 2022-05-06 NOTE — BH TRANSITION RECORD
Contact Information: If you have any questions, concerns, pended studies, tests and/or procedures, or emergencies regarding your inpatient behavioral health visit  Please contact [address] [place] [address] behavioral health unit 3B [phone]  and ask to speak to a , nurse or physician  A contact is available 24 hours/ 7 days a week at this number  Summary of Procedures Performed During your Stay:  Below is a list of major procedures performed during your hospital stay and a summary of results:  - No major procedures performed  Pending Studies (From admission, onward)     Start     Ordered    05/08/22 2100  Valproic acid level, total  Once        Comments: MUST BE DRAWN AROUND BEDTIME BEFORE DEPAKOTE DOSE  PLEASE DO NOT PUSH UNTIL MORNING BECAUSE IT WILL BE A WRONG VALUE, THANK YOU       05/06/22 0919    05/08/22 2100  Comprehensive metabolic panel  Once        Comments: PLEASE DRAW AT THE SAME TIME AS BEDTIME DEPAKOTE LEVEL SO PATIENT DOES NOT HAVE TO BE "STUCK" TWICE  Thank you!!      05/06/22 1015              If studies are pending at discharge, follow up with your PCP and/or referring provider

## 2022-05-06 NOTE — PROGRESS NOTES
BELL Group Note     05/06/22 0930   Activity/Group Checklist   Group Community meeting  (Goals)   Attendance Attended   Attendance Duration (min) 0-15   Interactions Did not interact   Affect/Mood Constricted  (slept)   Goals Achieved Able to listen to others

## 2022-05-06 NOTE — SOCIAL WORK
Worker left voicemail for  from [place]  Debara Galeazzi to advise of pending discharge for Monday   Worker requested return phone call to discuss d/c

## 2022-05-06 NOTE — PLAN OF CARE
Problem: Alteration in Thoughts and Perception  Goal: Refrain from acting on delusional thinking/internal stimuli  Description: Interventions:  - Monitor patient closely, per order   - Utilize least restrictive measures   - Set reasonable limits, give positive feedback for acceptable   - Administer medications as ordered and monitor of potential side effects  Outcome: Progressing  Goal: Agree to be compliant with medication regime, as prescribed and report medication side effects  Description: Interventions:  - Offer appropriate PRN medication and supervise ingestion; conduct AIMS, as needed   Outcome: Progressing     Problem: Ineffective Coping  Goal: Participates in unit activities  Description: Interventions:  - Provide therapeutic environment   - Provide required programming   - Redirect inappropriate behaviors   Outcome: Progressing     Problem: Risk for Self Injury/Neglect  Goal: Verbalize thoughts and feelings  Description: Interventions:  - Assess and re-assess patient's lethality and potential for self-injury  - Engage patient in 1:1 interactions, daily, for a minimum of 15 minutes  - Encourage patient to express feelings, fears, frustrations, hopes  - Establish rapport/trust with patient   Outcome: Progressing  Goal: Refrain from harming self  Description: Interventions:  - Monitor patient closely, per order  - Develop a trusting relationship  - Supervise medication ingestion, monitor effects and side effects   Outcome: Progressing     Problem: Depression  Goal: Refrain from isolation  Description: Interventions:  - Develop a trusting relationship   - Encourage socialization   Outcome: Progressing     Problem: Alteration in Orientation  Goal: Express concerns related to confused thinking related to:  Description: Interventions:  - Encourage patient to express feelings, fears, frustrations, hopes  - Assign consistent caregivers   - [place]/re-orient patient as needed  - Allow comfort items, as appropriate  - Provide visual cues, signs, etc    Outcome: Progressing  Goal: Cooperate with recommended testing/procedures  Description: Interventions:  - Determine need for ancillary testing  - Observe for mental status changes  - Implement falls/precaution protocol   Outcome: Progressing     Problem: DISCHARGE PLANNING - CARE MANAGEMENT  Goal: Discharge to post-acute care or home with appropriate resources  Description: INTERVENTIONS:  - Conduct assessment to determine patient/family and health care team treatment goals, and need for post-acute services based on payer coverage, community resources, and patient preferences, and barriers to discharge  - Address psychosocial, clinical, and financial barriers to discharge as identified in assessment in conjunction with the patient/family and health care team  - Arrange appropriate level of post-acute services according to patients   needs and preference and payer coverage in collaboration with the physician and health care team  - Communicate with and update the patient/family, physician, and health care team regarding progress on the discharge plan  - Arrange appropriate transportation to post-acute venues  Outcome: Progressing

## 2022-05-06 NOTE — PROGRESS NOTES
05/06/22 1000   Activity/Group Checklist   Group Other (Comment)  (OPEN STUDIO Art Therapy/Social Group, [employer])   Attendance Attended   Attendance Duration (min) Greater than 60   Interactions Interacted appropriately   Affect/Mood Appropriate   Goals Achieved Able to listen to others; Able to engage in interactions

## 2022-05-06 NOTE — SOCIAL WORK
Worker left voicemail for [employer] to schedule OP follow up appointments  Worker left voicemail for Care Manager (Sanam) from [facility] [address] to discuss discharge  Worker requested return phone call

## 2022-05-06 NOTE — SOCIAL WORK
Worker spoke with Care Manager (Sanam) regarding discharge  Worker advised Pt's medications have been sent to [facility]  Worker advised Covid test has been ordered and will be faxed when results are available  Care Manager advised they will pick pt up upon discharge and have tentatively scheduled pickup time for 11am on Monday pending d/c confirmation

## 2022-05-06 NOTE — PROGRESS NOTES
05/06/22 0956   Team Meeting   Meeting Type Daily Rounds   Team Members Present   Team Members Present Physician;Nurse;   Physician Team Member Dr Leonides Gallardo Team Member [address] Management Team Member Mamadou   Patient/Family Present   Patient Present No   Patient's Family Present No   Pt is medication compliant and denies MH symptoms  Pt was taken off of additional safety precautions following a sustained period of time free from SIB  Discharge pending for Monday

## 2022-05-06 NOTE — NURSING NOTE
Patient visible in the milieu at the start of the shift but has become more seclusive as the shift goes on  Denies SI, denies self abusive behaviors  Reports decreased depression  Compliant with medications  No behavioral issues  Will monitor

## 2022-05-07 PROCEDURE — 99232 SBSQ HOSP IP/OBS MODERATE 35: CPT | Performed by: STUDENT IN AN ORGANIZED HEALTH CARE EDUCATION/TRAINING PROGRAM

## 2022-05-07 RX ADMIN — ASPIRIN 81 MG CHEWABLE TABLET 81 MG: 81 TABLET CHEWABLE at 08:26

## 2022-05-07 RX ADMIN — ACETAMINOPHEN 975 MG: 325 TABLET ORAL at 08:26

## 2022-05-07 RX ADMIN — DIVALPROEX SODIUM 2500 MG: 500 TABLET, EXTENDED RELEASE ORAL at 21:21

## 2022-05-07 RX ADMIN — SENNOSIDES AND DOCUSATE SODIUM 1 TABLET: 50; 8.6 TABLET ORAL at 18:10

## 2022-05-07 RX ADMIN — HALOPERIDOL 10 MG: 10 TABLET ORAL at 08:27

## 2022-05-07 RX ADMIN — ATORVASTATIN CALCIUM 40 MG: 40 TABLET, FILM COATED ORAL at 16:01

## 2022-05-07 RX ADMIN — PRAZOSIN HYDROCHLORIDE 2 MG: 1 CAPSULE ORAL at 21:20

## 2022-05-07 RX ADMIN — LISINOPRIL 20 MG: 10 TABLET ORAL at 08:26

## 2022-05-07 RX ADMIN — SENNOSIDES AND DOCUSATE SODIUM 1 TABLET: 50; 8.6 TABLET ORAL at 08:26

## 2022-05-07 RX ADMIN — MIRTAZAPINE 30 MG: 30 TABLET, FILM COATED ORAL at 21:20

## 2022-05-07 RX ADMIN — HALOPERIDOL 10 MG: 10 TABLET ORAL at 21:19

## 2022-05-07 RX ADMIN — Medication 1000 UNITS: at 08:27

## 2022-05-07 RX ADMIN — LEVOCARNITINE 330 MG: 1 SOLUTION ORAL at 08:27

## 2022-05-07 NOTE — NURSING NOTE
The pt was alert and oriented, she was observed in the milieu and reading a book in the group rm  The pt denied SI and agreed to a verbal contract to safety  The pt informed this writer she was happy to be going home  The pt had no c/o pain or discomfort noted

## 2022-05-07 NOTE — PLAN OF CARE
Problem: Alteration in Thoughts and Perception  Goal: Refrain from acting on delusional thinking/internal stimuli  Description: Interventions:  - Monitor patient closely, per order   - Utilize least restrictive measures   - Set reasonable limits, give positive feedback for acceptable   - Administer medications as ordered and monitor of potential side effects  Outcome: Progressing  Goal: Agree to be compliant with medication regime, as prescribed and report medication side effects  Description: Interventions:  - Offer appropriate PRN medication and supervise ingestion; conduct AIMS, as needed   Outcome: Progressing     Problem: Ineffective Coping  Goal: Participates in unit activities  Description: Interventions:  - Provide therapeutic environment   - Provide required programming   - Redirect inappropriate behaviors   Outcome: Progressing     Problem: Risk for Self Injury/Neglect  Goal: Verbalize thoughts and feelings  Description: Interventions:  - Assess and re-assess patient's lethality and potential for self-injury  - Engage patient in 1:1 interactions, daily, for a minimum of 15 minutes  - Encourage patient to express feelings, fears, frustrations, hopes  - Establish rapport/trust with patient   Outcome: Progressing  Goal: Refrain from harming self  Description: Interventions:  - Monitor patient closely, per order  - Develop a trusting relationship  - Supervise medication ingestion, monitor effects and side effects   Outcome: Progressing     Problem: Depression  Goal: Refrain from isolation  Description: Interventions:  - Develop a trusting relationship   - Encourage socialization   Outcome: Progressing     Problem: Alteration in Orientation  Goal: Express concerns related to confused thinking related to:  Description: Interventions:  - Encourage patient to express feelings, fears, frustrations, hopes  - Assign consistent caregivers   - [place]/re-orient patient as needed  - Allow comfort items, as appropriate  - Provide visual cues, signs, etc    Outcome: Progressing  Goal: Cooperate with recommended testing/procedures  Description: Interventions:  - Determine need for ancillary testing  - Observe for mental status changes  - Implement falls/precaution protocol   Outcome: Progressing     Problem: DISCHARGE PLANNING - CARE MANAGEMENT  Goal: Discharge to post-acute care or home with appropriate resources  Description: INTERVENTIONS:  - Conduct assessment to determine patient/family and health care team treatment goals, and need for post-acute services based on payer coverage, community resources, and patient preferences, and barriers to discharge  - Address psychosocial, clinical, and financial barriers to discharge as identified in assessment in conjunction with the patient/family and health care team  - Arrange appropriate level of post-acute services according to patients   needs and preference and payer coverage in collaboration with the physician and health care team  - Communicate with and update the patient/family, physician, and health care team regarding progress on the discharge plan  - Arrange appropriate transportation to post-acute venues  Outcome: Progressing

## 2022-05-07 NOTE — NURSING NOTE
Patient denies SI, HI, AH's and VH's  She is pleasant and cooperative with staff  She is medication and meal compliant  Pt visible for breakfast out on the unit, and retreated back to bed afterwards  She denies any needs at this time

## 2022-05-07 NOTE — PROGRESS NOTES
Progress Note - Behavioral Health   Leydi Sanchez Signs 47 y o  female MRN: [id number]  Unit/Bed#: -02 Encounter: [id number]    Assessment/Plan   Principal Problem:    Major depression with psychotic features ([facility] )  Active Problems:    Primary hypertension    Hyperlipidemia    Class 3 severe obesity due to excess calories without serious comorbidity in [facility])    Medical clearance for psychiatric admission    Seizures ([facility] )    Hyponatremia    Insomnia    Constipation      Recommended Treatment:    Continue current psychotropic medication regimen   Continue medical management by SLIM team    Continue with group therapy, milieu therapy and occupational therapy   Continue frequent safety checks and vitals per unit protocol   Discharge disposition:  tentative discharge back to group home this week    Case discussed with treatment team   Risks, benefits and possible side effects of Medications: Risks, benefits, and possible side effects of medications have been explained to the patient, who verbalizes understanding       ------------------------------------------------------------  Chief Complaint:  "I am doing good"    Subjective: The patient was evaluated this morning for continuity of care and no acute distress noted throughout the evaluation  Over the past 24 hours staff noted the patient was off of paper clothes and finger foods since yesterday, did have a self-harm episode with plastic knife on 05/03  No episodes of self-harm since that time  Today on exam the patient was laying in bed and reports that she is feeling good, describes her mood is good, reports adequate sleep and appetite  She denies thoughts of self-harm, denies suicidal thoughts, contracts for safety at this time  Denies having any questions or concerns      Psychiatric Review of Systems:  Behavior over the last 24 hours:  improved  Sleep: normal  Appetite: normal  Medication side effects: No   ROS: no complaints, all other systems are negative    Current Medications:  Current Facility-Administered Medications   Medication Dose Route Frequency Provider Last Rate    acetaminophen  650 mg Oral Q6H PRN Harry Malik MD      acetaminophen  650 mg Oral Q4H PRN Harry Malik MD      acetaminophen  975 mg Oral Q6H PRN Harry Malik MD      aluminum-magnesium hydroxide-simethicone  30 mL Oral Q4H PRN Harry Malik MD      artificial tear  1 application Both Eyes N8A PRN Harry Malik MD      aspirin  81 mg Oral Daily Harry Malik MD      atorvastatin  40 mg Oral Daily With Slime Suh MD      benztropine  1 mg Intramuscular BID PRN Harry Malik MD      benztropine  1 mg Oral BID PRN Harry Malik MD      cholecalciferol  1,000 Units Oral Daily Harry Malik MD      hydrOXYzine HCL  50 mg Oral Q6H PRN Max 4/day Harry Malik MD      Or    diphenhydrAMINE  50 mg Intramuscular Q6H PRN Harry Malik MD      divalproex sodium  2,500 mg Oral HS Harry Malik MD      haloperidol           haloperidol  10 mg Oral BID Harry Malik MD      hydrOXYzine HCL  100 mg Oral Q6H PRN Max 4/day Harry Malik MD      Or    LORazepam  2 mg Intramuscular Q6H PRN Harry Malik MD      hydrOXYzine HCL  25 mg Oral Q6H PRN Max 4/day Harry Malik MD      ibuprofen  600 mg Oral Q6H PRN Luba Colvin PA-C      levOCARNitine  330 mg/day Oral QAM Harry Malik MD      lisinopril  20 mg Oral Daily Harry Malik MD      loperamide  2 mg Oral TID PRN Jonnathan Stone MD      melatonin  3 mg Oral HS PRN Harry Malik MD      mirtazapine  30 mg Oral HS Dorie Lema MD      OLANZapine  10 mg Oral Q3H PRN Max 3/day Harry Malik MD      Or    OLANZapine  10 mg Intramuscular Q3H PRN Max 3/day Harry Malik MD      OLANZapine  5 mg Oral Q3H PRN Max 6/day Lori Salvador Deangelo Oliveira MD      Or    OLANZapine  5 mg Intramuscular Q3H PRN Max 6/day Laisha Arteaga MD      OLANZapine  2 5 mg Oral Q3H PRN Max 8/day Laisha Arteaga MD      ondansetron  4 mg Oral Q6H PRN Sarahi Hope PA-C      polyethylene glycol  17 g Oral Daily PRN Laisha Arteaga MD      prazosin  2 mg Oral HS Laisha Arteaga MD      propranolol  10 mg Oral Q8H PRN Laisha Arteaga MD      senna-docusate sodium  1 tablet Oral BID [id number] Medications: all current active meds have been reviewed  Vital signs in last 24 hours:  Temp:  [97 5 °F (36 4 °C)-97 9 °F (36 6 °C)] 97 9 °F (36 6 °C)  HR:  [83-99] 99  Resp:  [16] 16  BP: (120-148)/(65-73) 148/67    Laboratory results:    I have personally reviewed all pertinent laboratory/tests results    Most Recent Labs:   Lab Results   Component Value Date    WBC 8 19 04/29/2022    RBC 4 17 04/29/2022    HGB 13 3 04/29/2022    HCT 42 1 04/29/2022     04/29/2022    RDW 14 0 04/29/2022    NEUTROABS 7 02 04/25/2022    SODIUM 138 05/05/2022    K 4 4 05/05/2022     05/05/2022    CO2 27 05/05/2022    BUN 9 05/05/2022    CREATININE 0 40 (L) 05/05/2022    GLUC 91 05/05/2022    GLUF 91 05/05/2022    CALCIUM 8 8 05/05/2022    AST 29 04/26/2022    ALT 17 04/26/2022    ALKPHOS 64 04/26/2022    TP 6 9 04/26/2022    ALB 3 0 (L) 04/26/2022    TBILI 0 72 04/26/2022    CHOLESTEROL 169 04/28/2022    HDL 45 (L) 04/28/2022    TRIG 180 (H) 04/28/2022    LDLCALC 88 04/28/2022    NONHDLC 124 04/28/2022    VALPROICTOT 43 8 (L) 05/02/2022    [id number] 2 680 04/25/2022    PREGUR negative 04/10/2022    PREGSERUM Negative 04/28/2022    RPR Non-Reactive 02/13/2022    HGBA1C 4 8 12/31/2021    EAG 91 12/31/2021       Mental Status Evaluation:    Appearance:  casually dressed, marginal hygiene, looks stated age   Behavior:  calm, no eye contact, Drowsy   Speech:  scant, soft   Mood:  "good" Affect:  constricted   Thought Process:  coherent, poverty of thought   Associations: intact associations   Thought Content:  no overt delusions   Perceptual Disturbances: no auditory hallucinations, no visual hallucinations, does not appear responding to internal stimuli   Risk Potential: Suicidal ideation - None at present  Homicidal ideation - None at present  Potential for aggression - No   Sensorium:  oriented to person, place and time/date   Memory:  recent and remote memory grossly intact   Consciousness:  alert and awake   Attention/Concentration: attention span and concentration are age appropriate   Insight:  limited   Judgment: limited   Gait/Station: in bed   Motor Activity: no abnormal movements         Recommended Treatment:   See above for assessment and plan  Risks, benefits and possible side effects of Medications:   Risks, benefits, and possible side effects of medications explained to patient and patient verbalizes understanding  This note has been constructed using a voice recognition system  There may be translation, syntax, or grammatical errors  If you have any questions, please contact the dictating provider  EARL Mckinley    Psychiatry PGY-2

## 2022-05-08 LAB
ALBUMIN SERPL BCP-MCNC: 3.8 G/DL (ref 3–5.2)
ALP SERPL-CCNC: 57 U/L (ref 43–122)
ALT SERPL W P-5'-P-CCNC: 24 U/L
ANION GAP SERPL CALCULATED.3IONS-SCNC: 5 MMOL/L (ref 5–14)
AST SERPL W P-5'-P-CCNC: 21 U/L (ref 14–36)
BILIRUB SERPL-MCNC: 0.68 MG/DL
BUN SERPL-MCNC: 11 MG/DL (ref 5–25)
CALCIUM SERPL-MCNC: 9.2 MG/DL (ref 8.4–10.2)
CHLORIDE SERPL-SCNC: 100 MMOL/L (ref 97–108)
CO2 SERPL-SCNC: 33 MMOL/L (ref 22–30)
CREAT SERPL-MCNC: 0.57 MG/DL (ref 0.6–1.2)
GFR SERPL CREATININE-BSD FRML MDRD: 107 ML/MIN/1.73SQ M
GLUCOSE SERPL-MCNC: 105 MG/DL (ref 70–99)
POTASSIUM SERPL-SCNC: 4.6 MMOL/L (ref 3.6–5)
PROT SERPL-MCNC: 7.1 G/DL (ref 5.9–8.4)
SODIUM SERPL-SCNC: 138 MMOL/L (ref 137–147)
VALPROATE SERPL-MCNC: 72.4 UG/ML (ref 50–120)

## 2022-05-08 PROCEDURE — 99232 SBSQ HOSP IP/OBS MODERATE 35: CPT | Performed by: STUDENT IN AN ORGANIZED HEALTH CARE EDUCATION/TRAINING PROGRAM

## 2022-05-08 PROCEDURE — 80164 ASSAY DIPROPYLACETIC ACD TOT: CPT

## 2022-05-08 PROCEDURE — 80053 COMPREHEN METABOLIC PANEL: CPT

## 2022-05-08 RX ADMIN — MIRTAZAPINE 30 MG: 30 TABLET, FILM COATED ORAL at 21:21

## 2022-05-08 RX ADMIN — HALOPERIDOL 10 MG: 10 TABLET ORAL at 08:28

## 2022-05-08 RX ADMIN — SENNOSIDES AND DOCUSATE SODIUM 1 TABLET: 50; 8.6 TABLET ORAL at 17:06

## 2022-05-08 RX ADMIN — SENNOSIDES AND DOCUSATE SODIUM 1 TABLET: 50; 8.6 TABLET ORAL at 08:29

## 2022-05-08 RX ADMIN — HALOPERIDOL 10 MG: 10 TABLET ORAL at 21:22

## 2022-05-08 RX ADMIN — PRAZOSIN HYDROCHLORIDE 2 MG: 1 CAPSULE ORAL at 21:21

## 2022-05-08 RX ADMIN — Medication 1000 UNITS: at 08:30

## 2022-05-08 RX ADMIN — LISINOPRIL 20 MG: 10 TABLET ORAL at 08:29

## 2022-05-08 RX ADMIN — ASPIRIN 81 MG CHEWABLE TABLET 81 MG: 81 TABLET CHEWABLE at 08:29

## 2022-05-08 RX ADMIN — DIVALPROEX SODIUM 2500 MG: 500 TABLET, EXTENDED RELEASE ORAL at 21:21

## 2022-05-08 RX ADMIN — LEVOCARNITINE 330 MG: 1 SOLUTION ORAL at 08:29

## 2022-05-08 RX ADMIN — ATORVASTATIN CALCIUM 40 MG: 40 TABLET, FILM COATED ORAL at 17:07

## 2022-05-08 NOTE — PROGRESS NOTES
Progress Note - Behavioral Health   Leydi Cynthia Hay 47 y o  female MRN: [id number]  Unit/Bed#: -02 Encounter: [id number]    Assessment/Plan   Principal Problem:    Major depression with psychotic features ([facility] )  Active Problems:    Primary hypertension    Hyperlipidemia    Class 3 severe obesity due to excess calories without serious comorbidity in [facility])    Medical clearance for psychiatric admission    Seizures ([facility] )    Hyponatremia    Insomnia    Constipation      Recommended Treatment:    Continue current psychotropic medication regimen   Continue medical management by SLIM team    Continue with group therapy, milieu therapy and occupational therapy   Continue frequent safety checks and vitals per unit protocol   Discharge disposition:  tentative discharge back to group home this week    Case discussed with treatment team   Risks, benefits and possible side effects of Medications: Risks, benefits, and possible side effects of medications have been explained to the patient, who verbalizes understanding       ------------------------------------------------------------  Chief Complaint:  "I'm good"    Subjective: The patient was evaluated this morning for continuity of care and no acute distress noted throughout the evaluation  Over the past 24 hours staff noted the patient was visible on the unit, social, denying all symptoms  Today on exam the patient was laying in bed and reports that she feels good, she describes appetite and sleep as good  She reports feeling safe on the unit, she tells this note writer that she is being discharged tomorrow and is "feeling positive  "She denies SI/HI and denies auditory/visual hallucinations      Psychiatric Review of Systems:  Behavior over the last 24 hours:  unchanged  Sleep: normal  Appetite: normal  Medication side effects: No   ROS: no complaints, all other systems are negative    Current Medications:  Current Facility-Administered Medications Medication Dose Route Frequency Provider Last Rate    acetaminophen  650 mg Oral Q6H PRN Valerie Barker MD      acetaminophen  650 mg Oral Q4H PRN Valerie Barker MD      acetaminophen  975 mg Oral Q6H PRN Valerie Barker MD      aluminum-magnesium hydroxide-simethicone  30 mL Oral Q4H PRN Valerie Barker MD      artificial tear  1 application Both Eyes S0B PRN Valerie Barker MD      aspirin  81 mg Oral Daily Valerie Barker MD      atorvastatin  40 mg Oral Daily With Bulah Gilford, MD      benztropine  1 mg Intramuscular BID PRN Valerie Barker MD      benztropine  1 mg Oral BID PRN Valerie Barker MD      cholecalciferol  1,000 Units Oral Daily Valerie Barker MD      hydrOXYzine HCL  50 mg Oral Q6H PRN Max 4/day Valerie Barker MD      Or    diphenhydrAMINE  50 mg Intramuscular Q6H PRN Valerie Barker MD      divalproex sodium  2,500 mg Oral HS Valerie Barker MD      haloperidol           haloperidol  10 mg Oral BID Valerie Barker MD      hydrOXYzine HCL  100 mg Oral Q6H PRN Max 4/day Valerie Barker MD      Or    LORazepam  2 mg Intramuscular Q6H PRN Valerie Barker MD      hydrOXYzine HCL  25 mg Oral Q6H PRN Max 4/day Valerie Barker MD      ibuprofen  600 mg Oral Q6H PRN Leticia Bansal PA-C      levOCARNitine  330 mg/day Oral QAM Valerie Barker MD      lisinopril  20 mg Oral Daily Valerie Barker MD      loperamide  2 mg Oral TID PRN Ema Ferro MD      melatonin  3 mg Oral HS PRN Valerie Barker MD      mirtazapine  30 mg Oral HS Mary Oh MD      OLANZapine  10 mg Oral Q3H PRN Max 3/day Valerie Barker MD      Or    OLANZapine  10 mg Intramuscular Q3H PRN Max 3/day Valerie Barker MD      OLANZapine  5 mg Oral Q3H PRN Max 6/day Valerie Barker MD      Or    OLANZapine  5 mg Intramuscular Q3H PRN Max 6/day Mally Ramirez Soumya Esposito MD      OLANZapine  2 5 mg Oral Q3H PRN Max 8/day Mariaelena Serrato MD      ondansetron  4 mg Oral Q6H PRN Maria G Godinez PA-C      polyethylene glycol  17 g Oral Daily PRN Mariaelena Serrato MD      prazosin  2 mg Oral HS Mariaelena Serrato MD      propranolol  10 mg Oral Q8H PRN Mariaelena Serrato MD      senna-docusate sodium  1 tablet Oral BID SHANI Chau         Behavioral Health Medications: all current active meds have been reviewed  Vital signs in last 24 hours:  Temp:  [97 4 °F (36 3 °C)-97 5 °F (36 4 °C)] 97 4 °F (36 3 °C)  HR:  [] 92  Resp:  [16] 16  BP: (121-165)/(72-76) 165/76    Laboratory results:    I have personally reviewed all pertinent laboratory/tests results    Most Recent Labs:   Lab Results   Component Value Date    WBC 8 19 04/29/2022    RBC 4 17 04/29/2022    HGB 13 3 04/29/2022    HCT 42 1 04/29/2022     04/29/2022    RDW 14 0 04/29/2022    NEUTROABS 7 02 04/25/2022    SODIUM 138 05/05/2022    K 4 4 05/05/2022     05/05/2022    CO2 27 05/05/2022    BUN 9 05/05/2022    CREATININE 0 40 (L) 05/05/2022    GLUC 91 05/05/2022    GLUF 91 05/05/2022    CALCIUM 8 8 05/05/2022    AST 29 04/26/2022    ALT 17 04/26/2022    ALKPHOS 64 04/26/2022    TP 6 9 04/26/2022    ALB 3 0 (L) 04/26/2022    TBILI 0 72 04/26/2022    CHOLESTEROL 169 04/28/2022    HDL 45 (L) 04/28/2022    TRIG 180 (H) 04/28/2022    LDLCALC 88 04/28/2022    NONHDLC 124 04/28/2022    VALPROICTOT 43 8 (L) 05/02/2022    [id number] 2 680 04/25/2022    PREGUR negative 04/10/2022    PREGSERUM Negative 04/28/2022    RPR Non-Reactive 02/13/2022    HGBA1C 4 8 12/31/2021    EAG 91 12/31/2021       Mental Status Evaluation:    Appearance:  casually dressed, marginal hygiene   Behavior:  calm, poor eye contact, Superficially cooperative   Speech:  scant, soft   Mood:  "good"   Affect:  constricted   Thought Process:  coherent, poverty of thought   Associations: intact associations   Thought Content:  no overt delusions   Perceptual Disturbances: no auditory hallucinations, no visual hallucinations, does not appear responding to internal stimuli   Risk Potential: Suicidal ideation - None at present  Homicidal ideation - None at present  Potential for aggression - No   Sensorium:  oriented to person, place and time/date   Memory:  recent and remote memory grossly intact   Consciousness:  alert and awake   Attention/Concentration: attention span and concentration are age appropriate   Insight:  limited   Judgment: limited   Gait/Station: in bed   Motor Activity: no abnormal movements       Recommended Treatment:   See above for assessment and plan  Risks, benefits and possible side effects of Medications:   Risks, benefits, and possible side effects of medications explained to patient and patient verbalizes understanding  This note has been constructed using a voice recognition system  There may be translation, syntax, or grammatical errors  If you have any questions, please contact the dictating provider  EARL Maldonado    Psychiatry PGY-2

## 2022-05-08 NOTE — PLAN OF CARE
Problem: Alteration in Thoughts and Perception  Goal: Refrain from acting on delusional thinking/internal stimuli  Description: Interventions:  - Monitor patient closely, per order   - Utilize least restrictive measures   - Set reasonable limits, give positive feedback for acceptable   - Administer medications as ordered and monitor of potential side effects  Outcome: Progressing  Goal: Agree to be compliant with medication regime, as prescribed and report medication side effects  Description: Interventions:  - Offer appropriate PRN medication and supervise ingestion; conduct AIMS, as needed   Outcome: Progressing     Problem: Ineffective Coping  Goal: Participates in unit activities  Description: Interventions:  - Provide therapeutic environment   - Provide required programming   - Redirect inappropriate behaviors   Outcome: Progressing     Problem: Risk for Self Injury/Neglect  Goal: Verbalize thoughts and feelings  Description: Interventions:  - Assess and re-assess patient's lethality and potential for self-injury  - Engage patient in 1:1 interactions, daily, for a minimum of 15 minutes  - Encourage patient to express feelings, fears, frustrations, hopes  - Establish rapport/trust with patient   Outcome: Progressing  Goal: Refrain from harming self  Description: Interventions:  - Monitor patient closely, per order  - Develop a trusting relationship  - Supervise medication ingestion, monitor effects and side effects   Outcome: Progressing     Problem: Depression  Goal: Refrain from isolation  Description: Interventions:  - Develop a trusting relationship   - Encourage socialization   Outcome: Progressing     Problem: Alteration in Orientation  Goal: Express concerns related to confused thinking related to:  Description: Interventions:  - Encourage patient to express feelings, fears, frustrations, hopes  - Assign consistent caregivers   - Baylis/re-orient patient as needed  - Allow comfort items, as appropriate  - Provide visual cues, signs, etc    Outcome: Progressing  Goal: Cooperate with recommended testing/procedures  Description: Interventions:  - Determine need for ancillary testing  - Observe for mental status changes  - Implement falls/precaution protocol   Outcome: Progressing     Problem: DISCHARGE PLANNING - CARE MANAGEMENT  Goal: Discharge to post-acute care or home with appropriate resources  Description: INTERVENTIONS:  - Conduct assessment to determine patient/family and health care team treatment goals, and need for post-acute services based on payer coverage, community resources, and patient preferences, and barriers to discharge  - Address psychosocial, clinical, and financial barriers to discharge as identified in assessment in conjunction with the patient/family and health care team  - Arrange appropriate level of post-acute services according to patients   needs and preference and payer coverage in collaboration with the physician and health care team  - Communicate with and update the patient/family, physician, and health care team regarding progress on the discharge plan  - Arrange appropriate transportation to post-acute venues  Outcome: Progressing

## 2022-05-08 NOTE — NURSING NOTE
Sue Francisco is constricted upon approach  Denies depression and anxiety  Denies HI/SI/AH/VH  She is not visible and social on the unit  Isolative to self and room this morning  Medications/meal compliant

## 2022-05-08 NOTE — NURSING NOTE
PT cooperative and pleasant towards staff and interactive to other peers  At the beginning of the shift observed in bed and was refusing vital, later was able to agree and stated "I am sorry I was just tired before"  PT denies SI/HI/VH/AH; "a bite depressed and sad"  Unable to elaborate as to what is causing her sadness and depression  PT in her room reading currently  Meds and meal compliant  No unmet needs at the moment

## 2022-05-09 VITALS
HEIGHT: 66 IN | TEMPERATURE: 97.5 F | SYSTOLIC BLOOD PRESSURE: 124 MMHG | HEART RATE: 85 BPM | DIASTOLIC BLOOD PRESSURE: 68 MMHG | BODY MASS INDEX: 42.11 KG/M2 | OXYGEN SATURATION: 100 % | WEIGHT: 262 LBS | RESPIRATION RATE: 16 BRPM

## 2022-05-09 PROBLEM — Z00.8 MEDICAL CLEARANCE FOR PSYCHIATRIC ADMISSION: Status: RESOLVED | Noted: 2022-04-27 | Resolved: 2022-05-09

## 2022-05-09 PROBLEM — E87.1 HYPONATREMIA: Status: RESOLVED | Noted: 2022-04-27 | Resolved: 2022-05-09

## 2022-05-09 PROBLEM — K59.00 CONSTIPATION: Status: RESOLVED | Noted: 2022-05-06 | Resolved: 2022-05-09

## 2022-05-09 PROCEDURE — 99238 HOSP IP/OBS DSCHRG MGMT 30/<: CPT | Performed by: STUDENT IN AN ORGANIZED HEALTH CARE EDUCATION/TRAINING PROGRAM

## 2022-05-09 RX ADMIN — LEVOCARNITINE 330 MG: 1 SOLUTION ORAL at 08:32

## 2022-05-09 RX ADMIN — ASPIRIN 81 MG CHEWABLE TABLET 81 MG: 81 TABLET CHEWABLE at 08:31

## 2022-05-09 RX ADMIN — HALOPERIDOL 10 MG: 10 TABLET ORAL at 08:31

## 2022-05-09 RX ADMIN — Medication 1000 UNITS: at 08:32

## 2022-05-09 RX ADMIN — LISINOPRIL 20 MG: 10 TABLET ORAL at 08:31

## 2022-05-09 RX ADMIN — SENNOSIDES AND DOCUSATE SODIUM 1 TABLET: 50; 8.6 TABLET ORAL at 08:31
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
## 2022-05-09 NOTE — PLAN OF CARE
Problem: Alteration in Thoughts and Perception  Goal: Refrain from acting on delusional thinking/internal stimuli  Description: Interventions:  - Monitor patient closely, per order   - Utilize least restrictive measures   - Set reasonable limits, give positive feedback for acceptable   - Administer medications as ordered and monitor of potential side effects  Outcome: Progressing  Goal: Agree to be compliant with medication regime, as prescribed and report medication side effects  Description: Interventions:  - Offer appropriate PRN medication and supervise ingestion; conduct AIMS, as needed   Outcome: Progressing     Problem: Ineffective Coping  Goal: Participates in unit activities  Description: Interventions:  - Provide therapeutic environment   - Provide required programming   - Redirect inappropriate behaviors   Outcome: Progressing     Problem: Risk for Self Injury/Neglect  Goal: Verbalize thoughts and feelings  Description: Interventions:  - Assess and re-assess patient's lethality and potential for self-injury  - Engage patient in 1:1 interactions, daily, for a minimum of 15 minutes  - Encourage patient to express feelings, fears, frustrations, hopes  - Establish rapport/trust with patient   Outcome: Progressing  Goal: Refrain from harming self  Description: Interventions:  - Monitor patient closely, per order  - Develop a trusting relationship  - Supervise medication ingestion, monitor effects and side effects   Outcome: Progressing     Problem: Depression  Goal: Refrain from isolation  Description: Interventions:  - Develop a trusting relationship   - Encourage socialization   Outcome: Progressing     Problem: Alteration in Orientation  Goal: Express concerns related to confused thinking related to:  Description: Interventions:  - Encourage patient to express feelings, fears, frustrations, hopes  - Assign consistent caregivers   - [place]/re-orient patient as needed  - Allow comfort items, as appropriate  - Provide visual cues, signs, etc    Outcome: Progressing  Goal: Cooperate with recommended testing/procedures  Description: Interventions:  - Determine need for ancillary testing  - Observe for mental status changes  - Implement falls/precaution protocol   Outcome: Progressing     Problem: DISCHARGE PLANNING - CARE MANAGEMENT  Goal: Discharge to post-acute care or home with appropriate resources  Description: INTERVENTIONS:  - Conduct assessment to determine patient/family and health care team treatment goals, and need for post-acute services based on payer coverage, community resources, and patient preferences, and barriers to discharge  - Address psychosocial, clinical, and financial barriers to discharge as identified in assessment in conjunction with the patient/family and health care team  - Arrange appropriate level of post-acute services according to patients   needs and preference and payer coverage in collaboration with the physician and health care team  - Communicate with and update the patient/family, physician, and health care team regarding progress on the discharge plan  - Arrange appropriate transportation to post-acute venues  Outcome: Progressing

## 2022-05-09 NOTE — NURSING NOTE
Leydi denies MH symptoms  She is accepting of her medications  She reports excitement for discharge  AVS printed and reviewed with pt  Belongings gathered and reviewed with pt  No behavioral issues to be reported  Pt discharged without incident

## 2022-05-09 NOTE — SOCIAL WORK
Pt to D/C today  Pt denies SI/HI/AVH  Pt oriented x3  Pt to d/c to [place]  [employer] and group home staff will  upon discharge  Pt to follow up with [facility] on 5/11 at 10:40am  Scripts sent to preferred pharmacy       Discharge [facility] [address], [facility], [address]  Phone: [phone]

## 2022-05-09 NOTE — PROGRESS NOTES
Met with Leydi to complete her relapse prevention form  Her affect was bright looking forward to returning home  She noted her boyfriend has been hospitalized upstairs on Friday  She denies self harm thoughts and does not hear any voices  She identified her triggers to be admitted were: voices, depression and suicidal thoughts  Her coping skills are word search, puzzles, music and reading  Her support system are: the staff at her home, Garett Reese and Jenna Cleveland  Her warning signs are feeling like she wants to cry, shaking, racing thoughts, thoughts of self harm and less energy   Patient is verbally optimistic that she will not be readmitted into the hospital

## 2022-05-09 NOTE — NURSING NOTE
Pt is pleasant and cooperative  Pt was isolative to room except when making needs known  Pt is med compliant  Pt denies all psych symptoms  No unmet needs reported

## 2022-05-09 NOTE — PROGRESS NOTES
05/09/22 0950   Team Meeting   Meeting Type Daily Rounds   Team Members Present   Team Members Present Physician;Nurse;   Physician Team Member Dr Eileen Harrison Team Member [address] Management Team Member Mamadou   Patient/Family Present   Patient Present No   Patient's Family Present No   Pt is medication compliant and denies symptoms  Pt is scheduled for discharge today

## 2022-05-09 NOTE — BH TRANSITION RECORD
Contact Information: If you have any questions, concerns, pended studies, tests and/or procedures, or emergencies regarding your inpatient behavioral health visit  Please contact [facility] behavioral health unit 3B [phone]  and ask to speak to a , nurse or physician  A contact is available 24 hours/ 7 days a week at this number  Summary of Procedures Performed During your Stay:  Below is a list of major procedures performed during your hospital stay and a summary of results:  - Cardiac Procedures/Studies: EKG 4/28/22: Normal sinus rhythm, QTc 441  Pending Studies (From admission, onward)    None        If studies are pending at discharge, follow up with your PCP and/or referring provider      Sawyer Amaya MD 05/09/22

## 2022-05-12 NOTE — BEHAVIORAL HEALTH HIGH UTILIZER
Patient Name:Leydi Gordillo MRN:  [id number]         : 1970     Age: 47 y o  Sex: female   Utilization History:  (# of ED visits & IP admits; reasons)    Pt had approx 16 ED visits within the past year (7841-5344) related to SI's with a plan, feelings that no one loves or cares about her, self-harm superficial scratches with a thumbtack, and calling 911 on herself from her group home setting  Pt had three 30-day behavioral health readmissions within the past year (1961-7098) related to SI's with a plan, auditory hallucinations, or self-harm behaviors such as scratching herself with a thumbtack or plastic fork  Treatment Recommendations & Presentation:  Presentation in the ED: Upon ED visit, pt will often state that no one cares about her or loves her; she may or may not present with SI or a plan  She may report that she just had a fight with her boyfriend (who resides at [facility]) or that he doesn't love her  Pt may report SI's with a plan to cut her wrists, jump or walk in front of a car or traffic, hang herself, or drink bleach  Pt has repeatedly stated that she will kill herself with a thumbtack and has superficially scratched or poked herself with a thumbtack on multiple occasions and has also utilized a plastic fork to scratch herself  Pt will call 911 on herself in order for EMS to bring her to the ED and tends to do this at certain times when group home staffing is more limited  Pt's boyfriend lives at [facility], if he is being hospitalized then pt also wants hospitalization  Please call Sanam from [facility] when pt comes to ED [phone] or Nahed Fontenot [phone]  ED Recommendations: First line of contact is Care Coordinator Sanam from [facility] [phone] or Estuardo from [facility] at [phone] or Therapist Jas Kenny from [facility] at [phone] ext [phone]  They can develop and initiate an action plan with ED staff for pt's safe discharge from ED       Home Medication Regimen: Discuss pt's most current medication list with staff or nurse from [facility] [phone] or the above mentioned Collins Balderas  Recent Medical Work Ups:  (include Psych testing or ECT)     EKG on 4/25/22, Comprehensive lab work in April and May of 2022, Valproic Acid level of 72 4 on 5/8/22  [facility]ations: [facility] as brief stays have been requested by [facility] staff  Pt should attend groups, pt has a High Risk Therapeutic Plan from Programming  [facility] staff should be contacted as soon as possible to participate in pt's treatment and discharge planning  Outpatient recommendations: Pt is to continue [facility] treatment [facility]  [facility], care coordinators, and therapist should develop a crisis plan with pt and healthy coping skills in response to her distressful feelings  Situation/Relevant Background Info:    Pt is a 47year old female with cognitive impairment who resides in a group home setting at [facility]  Additionally, pt has care coordinators and receives therapy and psychiatric care through the [facility]  Pt has a chronic major mental health disorder history as well as borderline personality disorder  Pt appears to derive secondary gains from ED visits and experiences as well inpatient behavioral health [facility]) admissions; therefore, group home staff has advised that pt should have brief behavioral health stays/hospitalizations  Pt states she has a boyfriend who is a support system for her; staff confirms that pt has a boyfriend who also lives at [facility] and she will often mirror his issues - pt attempts to be hospitalized when her boyfriend is being hospitalized          Diagnoses/Significant Problems (Medical & Psychiatric):   Psychiatric:  Bipolar 1 Disorder, MDD, PTSD, Borderline Personality Disorder    Medical: Hyponatremia ([facility] [address] ), Seizures ([facility] [address] ), Obesity, Hyperlipidemia, HTN, Cognitive Impairment             Drivers of repeated utilization:      Appears to obtain secondary gains from ED visits and inpatient [facility] stays, impulsivity, limited coping skills, attention seeking behaviors, wanting to be inpt if boyfriend is hospitalized                                        Existing Community Resources & Supports:                 Boyfriend Izaiah at [facility] which pt considers supportive  Little to no family involvement    Patient Medical & Psychiatric Care Team:  [address] [place] [address] [phone]  [facility] (group home) [phone], Care Coordinator Rachel Collins [phone]    Estuardo, Care Supervisor Rachel Collins [phone]  Mathew Chanel, Therapist, [facility] [phone]  [phone]  Dr Kun Grant, Psychiatrist, [address] [place] [address] plan date: 05/12/22                   Author:  Mayuri Pennington RN                 Date reviewed with patient:

## 2022-05-13 ENCOUNTER — HOSPITAL ENCOUNTER (EMERGENCY)
Facility: HOSPITAL | Age: 52
End: 2022-05-15
Attending: EMERGENCY MEDICINE | Admitting: EMERGENCY MEDICINE
Payer: MEDICARE

## 2022-05-13 DIAGNOSIS — R45.851 SUICIDAL IDEATION: Primary | ICD-10-CM

## 2022-05-13 LAB
ALBUMIN SERPL BCP-MCNC: 3.4 G/DL (ref 3.5–5)
ALP SERPL-CCNC: 70 U/L (ref 46–116)
ALT SERPL W P-5'-P-CCNC: 22 U/L (ref 12–78)
AMPHETAMINES SERPL QL SCN: NEGATIVE
ANION GAP SERPL CALCULATED.3IONS-SCNC: 6 MMOL/L (ref 4–13)
ANISOCYTOSIS BLD QL SMEAR: PRESENT
AST SERPL W P-5'-P-CCNC: 14 U/L (ref 5–45)
BARBITURATES UR QL: NEGATIVE
BASOPHILS # BLD MANUAL: 0 THOUSAND/UL (ref 0–0.1)
BASOPHILS NFR MAR MANUAL: 0 % (ref 0–1)
BENZODIAZ UR QL: NEGATIVE
BILIRUB SERPL-MCNC: 0.3 MG/DL (ref 0.2–1)
BILIRUB UR QL STRIP: NEGATIVE
BUN SERPL-MCNC: 15 MG/DL (ref 5–25)
CALCIUM ALBUM COR SERPL-MCNC: 9.6 MG/DL (ref 8.3–10.1)
CALCIUM SERPL-MCNC: 9.1 MG/DL (ref 8.3–10.1)
CHLORIDE SERPL-SCNC: 99 MMOL/L (ref 100–108)
CLARITY UR: CLEAR
CO2 SERPL-SCNC: 29 MMOL/L (ref 21–32)
COCAINE UR QL: NEGATIVE
COLOR UR: YELLOW
CREAT SERPL-MCNC: 0.52 MG/DL (ref 0.6–1.3)
EOSINOPHIL # BLD MANUAL: 0.11 THOUSAND/UL (ref 0–0.4)
EOSINOPHIL NFR BLD MANUAL: 1 % (ref 0–6)
ERYTHROCYTE [DISTWIDTH] IN BLOOD BY AUTOMATED COUNT: 14.1 % (ref 11.6–15.1)
FLUAV RNA RESP QL NAA+PROBE: NEGATIVE
FLUBV RNA RESP QL NAA+PROBE: NEGATIVE
GFR SERPL CREATININE-BSD FRML MDRD: 110 ML/MIN/1.73SQ M
GLUCOSE SERPL-MCNC: 99 MG/DL (ref 65–140)
GLUCOSE UR STRIP-MCNC: NEGATIVE MG/DL
HCT VFR BLD AUTO: 38.1 % (ref 34.8–46.1)
HGB BLD-MCNC: 12.4 G/DL (ref 11.5–15.4)
HGB UR QL STRIP.AUTO: NEGATIVE
KETONES UR STRIP-MCNC: ABNORMAL MG/DL
LEUKOCYTE ESTERASE UR QL STRIP: ABNORMAL
LYMPHOCYTES # BLD AUTO: 2.75 THOUSAND/UL (ref 0.6–4.47)
LYMPHOCYTES # BLD AUTO: 26 % (ref 14–44)
MCH RBC QN AUTO: 31.7 PG (ref 26.8–34.3)
MCHC RBC AUTO-ENTMCNC: 32.5 G/DL (ref 31.4–37.4)
MCV RBC AUTO: 97 FL (ref 82–98)
METAMYELOCYTES NFR BLD MANUAL: 3 % (ref 0–1)
METHADONE UR QL: NEGATIVE
MONOCYTES # BLD AUTO: 0.95 THOUSAND/UL (ref 0–1.22)
MONOCYTES NFR BLD: 9 % (ref 4–12)
NEUTROPHILS # BLD MANUAL: 6.44 THOUSAND/UL (ref 1.85–7.62)
NEUTS BAND NFR BLD MANUAL: 1 % (ref 0–8)
NEUTS SEG NFR BLD AUTO: 60 % (ref 43–75)
NITRITE UR QL STRIP: NEGATIVE
OPIATES UR QL SCN: NEGATIVE
OXYCODONE+OXYMORPHONE UR QL SCN: NEGATIVE
PCP UR QL: NEGATIVE
PH UR STRIP.AUTO: 6 [PH]
PLATELET # BLD AUTO: 241 THOUSANDS/UL (ref 149–390)
PLATELET BLD QL SMEAR: ADEQUATE
PMV BLD AUTO: 9.3 FL (ref 8.9–12.7)
POLYCHROMASIA BLD QL SMEAR: PRESENT
POTASSIUM SERPL-SCNC: 4.5 MMOL/L (ref 3.5–5.3)
PROT SERPL-MCNC: 7.2 G/DL (ref 6.4–8.2)
PROT UR STRIP-MCNC: NEGATIVE MG/DL
RBC # BLD AUTO: 3.91 MILLION/UL (ref 3.81–5.12)
RSV RNA RESP QL NAA+PROBE: NEGATIVE
SARS-COV-2 RNA RESP QL NAA+PROBE: NEGATIVE
SODIUM SERPL-SCNC: 134 MMOL/L (ref 136–145)
SP GR UR STRIP.AUTO: 1.02 (ref 1–1.03)
THC UR QL: NEGATIVE
TSH SERPL DL<=0.05 MIU/L-ACNC: 3.56 UIU/ML (ref 0.45–4.5)
UROBILINOGEN UR QL STRIP.AUTO: 1 E.U./DL
WBC # BLD AUTO: 10.56 THOUSAND/UL (ref 4.31–10.16)

## 2022-05-13 PROCEDURE — 85007 BL SMEAR W/DIFF WBC COUNT: CPT | Performed by: EMERGENCY MEDICINE

## 2022-05-13 PROCEDURE — 93005 ELECTROCARDIOGRAM TRACING: CPT

## 2022-05-13 PROCEDURE — 84443 ASSAY THYROID STIM HORMONE: CPT | Performed by: EMERGENCY MEDICINE

## 2022-05-13 PROCEDURE — 36415 COLL VENOUS BLD VENIPUNCTURE: CPT | Performed by: EMERGENCY MEDICINE

## 2022-05-13 PROCEDURE — 99285 EMERGENCY DEPT VISIT HI MDM: CPT | Performed by: EMERGENCY MEDICINE

## 2022-05-13 PROCEDURE — 80053 COMPREHEN METABOLIC PANEL: CPT | Performed by: EMERGENCY MEDICINE

## 2022-05-13 PROCEDURE — 85027 COMPLETE CBC AUTOMATED: CPT | Performed by: EMERGENCY MEDICINE

## 2022-05-13 PROCEDURE — 80307 DRUG TEST PRSMV CHEM ANLYZR: CPT | Performed by: EMERGENCY MEDICINE

## 2022-05-13 PROCEDURE — 0241U HB NFCT DS VIR RESP RNA 4 TRGT: CPT | Performed by: EMERGENCY MEDICINE

## 2022-05-13 PROCEDURE — 99285 EMERGENCY DEPT VISIT HI MDM: CPT

## 2022-05-13 RX ORDER — LANOLIN ALCOHOL/MO/W.PET/CERES
3 CREAM (GRAM) TOPICAL
Status: DISCONTINUED | OUTPATIENT
Start: 2022-05-13 | End: 2022-05-15 | Stop reason: HOSPADM

## 2022-05-13 RX ORDER — PRAZOSIN HYDROCHLORIDE 1 MG/1
2 CAPSULE ORAL DAILY
Status: DISCONTINUED | OUTPATIENT
Start: 2022-05-14 | End: 2022-05-15 | Stop reason: HOSPADM

## 2022-05-13 RX ORDER — DIVALPROEX SODIUM 500 MG/1
2500 TABLET, EXTENDED RELEASE ORAL
Status: DISCONTINUED | OUTPATIENT
Start: 2022-05-13 | End: 2022-05-15 | Stop reason: HOSPADM

## 2022-05-13 RX ORDER — LISINOPRIL 20 MG/1
20 TABLET ORAL DAILY
Status: DISCONTINUED | OUTPATIENT
Start: 2022-05-14 | End: 2022-05-15 | Stop reason: HOSPADM

## 2022-05-13 RX ORDER — ASPIRIN 81 MG/1
81 TABLET, CHEWABLE ORAL DAILY
Status: DISCONTINUED | OUTPATIENT
Start: 2022-05-14 | End: 2022-05-15 | Stop reason: HOSPADM

## 2022-05-13 RX ORDER — SENNOSIDES 8.6 MG
1 TABLET ORAL 2 TIMES DAILY
Status: DISCONTINUED | OUTPATIENT
Start: 2022-05-13 | End: 2022-05-15 | Stop reason: HOSPADM

## 2022-05-13 RX ORDER — HALOPERIDOL 10 MG/1
10 TABLET ORAL 2 TIMES DAILY
Status: DISCONTINUED | OUTPATIENT
Start: 2022-05-13 | End: 2022-05-15 | Stop reason: HOSPADM

## 2022-05-13 RX ORDER — LEVOCARNITINE 1 G/10ML
1000 SOLUTION ORAL EVERY MORNING
Status: DISCONTINUED | OUTPATIENT
Start: 2022-05-14 | End: 2022-05-15 | Stop reason: HOSPADM

## 2022-05-13 RX ORDER — MIRTAZAPINE 15 MG/1
30 TABLET, FILM COATED ORAL
Status: DISCONTINUED | OUTPATIENT
Start: 2022-05-13 | End: 2022-05-15 | Stop reason: HOSPADM

## 2022-05-13 RX ORDER — ATORVASTATIN CALCIUM 40 MG/1
40 TABLET, FILM COATED ORAL
Status: DISCONTINUED | OUTPATIENT
Start: 2022-05-14 | End: 2022-05-15 | Stop reason: HOSPADM

## 2022-05-13 RX ADMIN — SENNOSIDES 8.6 MG: 8.6 TABLET ORAL at 22:15

## 2022-05-13 RX ADMIN — HALOPERIDOL 10 MG: 10 TABLET ORAL at 22:15

## 2022-05-13 RX ADMIN — MIRTAZAPINE 30 MG: 15 TABLET, FILM COATED ORAL at 22:15

## 2022-05-13 RX ADMIN — Medication 3 MG: at 22:15

## 2022-05-13 RX ADMIN — DIVALPROEX SODIUM 2500 MG: 500 TABLET, EXTENDED RELEASE ORAL at 22:15

## 2022-05-13 NOTE — ED PROCEDURE NOTE
Kathy Gonsalez is 47 y o , Caucassian, single female,who is currently on disability for mental health reasons  Patient who self-referred  to ED due to having SI thoughts with an active plan  When asked about the presenting problem, patient stated,she will cut her wrists or throat and if that does not work she will jump out into traffic  "  Patient reported struggling with  SI thoughts for the last few days  She is reporting that she is having Auditory  Hallucinations, the voices are telling her she is fat and ugly  Current stressors include that she does not care about life anymore  Regarding barriers, patient reported none  Patient lives at  [facility] a personal care boarding home  She has been residing there for 6 months  Patient has no access to [employer]  Patient is reporting nochanges in appetite  Patient reports that she is getting 5 hours a sleep a night  Patient denies  legal issues  Regarding mental health treatment, patient reported that she is currently involved with a psychiatrist and therapist through [facility]  Patient was receptive to 201  201 form was completed at this time       Violet Kay MS

## 2022-05-13 NOTE — ED PROVIDER NOTES
History  Chief Complaint   Patient presents with    Suicidal     Patient complaint of "feeling sad last few days, I tried to cut my arm with thumb tack and I think about cutting my throat"     Patient is a 56-year-old female with a past medical history significant for psychiatric disorder, recent discharge from psychiatric facility who today presents with suicidal ideations  Patient reports that she wants to cut her throat or wrists and if that does not work, she wants to jump in front of a car  She states that she was doing better after her discharge, but in the last few days started to have these thoughts again  Denies any homicidal ideations, auditory visual hallucinations  Prior to Admission Medications   Prescriptions Last Dose Informant Patient Reported?  Taking?   aspirin 81 mg chewable tablet   No No   Sig: Chew 1 tablet (81 mg total) daily   atorvastatin (LIPITOR) 40 mg tablet   No No   Sig: Take 1 tablet (40 mg total) by mouth daily with dinner   cholecalciferol (VITAMIN D3) 1,000 units tablet   No No   Sig: Take 1 tablet (1,000 Units total) by mouth daily   divalproex sodium (DEPAKOTE ER) 500 mg 24 hr tablet   No No   Sig: Take 5 tablets (2,500 mg total) by mouth daily at bedtime   haloperidol (HALDOL) 10 mg tablet   No No   Sig: Take 1 tablet (10 mg total) by mouth 2 (two) times a day Take 1 tablet in the morning and 1 tablet before bedtime   ibuprofen (MOTRIN) 600 mg tablet   No No   Sig: Take 1 tablet (600 mg total) by mouth every 6 (six) hours as needed for moderate pain   levOCARNitine (CARNITOR) 1 g/10 mL solution   No No   Sig: Take 3 3 mL (330 mg total) by mouth in the morning   lisinopril (ZESTRIL) 20 mg tablet   No No   Sig: Take 1 tablet (20 mg total) by mouth daily   melatonin 3 mg   No No   Sig: Take 1 tablet (3 mg total) by mouth daily at bedtime as needed (insomnia)   mirtazapine (REMERON) 30 mg tablet   No No   Sig: Take 1 tablet (30 mg total) by mouth daily at bedtime prazosin (MINIPRESS) 2 mg capsule   No No   Sig: Take 1 capsule (2 mg total) by mouth daily at bedtime   senna-docusate sodium (SENOKOT S) 8 6-50 mg per tablet   No No   Sig: Take 1 tablet by mouth 2 (two) times a day      Facility-Administered Medications: None       Past Medical History:   Diagnosis Date    Anxiety     Bipolar 1 disorder ([place] [address] )     Bipolar affective disorder, depressed, severe ([place] [address] ) 10/10/2021    Borderline personality disorder ([place] [address] )     CAD (coronary artery disease)     Cognitive impairment     Depression     Dyslipidemia     GERD (gastroesophageal reflux disease)     Hypertension     Obesity     Psychiatric disorder     PTSD (post-traumatic stress disorder)     Schizoaffective disorder ([place] [address] )     Seizure ([place] [address] )        Past Surgical History:   Procedure Laterality Date    APPENDECTOMY      PATIENT DENIES HAVING APPENDIX REMOVED    CHOLECYSTECTOMY      FOOT SURGERY Right        Family History   Problem Relation Age of Onset    Kidney cancer Mother     Cerebral aneurysm Father      I have reviewed and agree with the history as documented  E-Cigarette/Vaping    E-Cigarette Use Never User      E-Cigarette/Vaping Substances    Nicotine No     THC No     CBD No     Flavoring No     Other No     Unknown No      Social History     Tobacco Use    Smoking status: Never Smoker    Smokeless tobacco: Never Used    Tobacco comment: Pt stated "smokes when stressed"   Vaping Use    Vaping Use: Never used   Substance Use Topics    Alcohol use: Not Currently    Drug use: Not Currently       Review of Systems   Constitutional: Negative for chills and fever  HENT: Negative for congestion and rhinorrhea  Eyes: Negative for photophobia and visual disturbance  Respiratory: Negative for cough and shortness of breath  Cardiovascular: Negative for chest pain and palpitations  Gastrointestinal: Negative for abdominal pain, constipation, diarrhea, nausea and vomiting  Genitourinary: Negative for dysuria, flank pain and hematuria  Musculoskeletal: Negative for back pain and neck pain  Skin: Negative for color change and pallor  Neurological: Negative for dizziness, weakness, light-headedness, numbness and headaches  Psychiatric/Behavioral: Positive for suicidal ideas  Physical Exam  Physical Exam  Vitals and nursing note reviewed  Constitutional:       General: She is not in acute distress  Appearance: Normal appearance  She is obese  She is not ill-appearing, toxic-appearing or diaphoretic  HENT:      Head: Normocephalic and atraumatic  Mouth/Throat:      Mouth: Mucous membranes are moist    Eyes:      Conjunctiva/sclera: Conjunctivae normal       Pupils: Pupils are equal, round, and reactive to light  Cardiovascular:      Rate and Rhythm: Normal rate and regular rhythm  Pulses: Normal pulses  Heart sounds: Normal heart sounds  No murmur heard  Pulmonary:      Effort: Pulmonary effort is normal  No respiratory distress  Breath sounds: Normal breath sounds  No stridor  No wheezing, rhonchi or rales  Chest:      Chest wall: No tenderness  Abdominal:      General: Bowel sounds are normal  There is no distension  Palpations: Abdomen is soft  Tenderness: There is no abdominal tenderness  There is no guarding or rebound  Musculoskeletal:      Cervical back: Neck supple  Right lower leg: No edema  Left lower leg: No edema  Skin:     General: Skin is warm and dry  Neurological:      General: No focal deficit present  Mental Status: She is alert and oriented to person, place, and time  Mental status is at baseline  Psychiatric:         Mood and Affect: Affect is blunt  Behavior: Behavior is cooperative  Thought Content: Thought content includes suicidal ideation  Thought content does not include homicidal ideation  Thought content includes suicidal plan   Thought content does not include homicidal plan  Vital Signs  ED Triage Vitals [05/13/22 1707]   Temperature Pulse Respirations Blood Pressure SpO2   (!) 97 °F (36 1 °C) 98 18 125/72 98 %      Temp src Heart Rate Source Patient Position - Orthostatic VS BP Location FiO2 (%)   -- Monitor Sitting Right arm --      Pain Score       --           Vitals:    05/13/22 1707   BP: 125/72   Pulse: 98   Patient Position - Orthostatic VS: Sitting         Visual Acuity      ED Medications  Medications   aspirin chewable tablet 81 mg (has no administration in time range)   atorvastatin (LIPITOR) tablet 40 mg (has no administration in time range)   divalproex sodium (DEPAKOTE ER) 24 hr tablet 2,500 mg (has no administration in time range)   haloperidol (HALDOL) tablet 10 mg (has no administration in time range)   levOCARNitine (CARNITOR) oral solution 1,000 mg (has no administration in time range)   lisinopril (ZESTRIL) tablet 20 mg (has no administration in time range)   melatonin tablet 3 mg (has no administration in time range)   mirtazapine (REMERON) tablet 30 mg (has no administration in time range)   prazosin (MINIPRESS) capsule 2 mg (has no administration in time range)   senna (SENOKOT) tablet 8 6 mg (has no administration in time range)       Diagnostic Studies  Results Reviewed     Procedure Component Value Units Date/Time    COVID/FLU/RSV - 2 hour TAT [[id number]]  (Normal) Collected: 05/13/22 1733    Lab Status: Final result Specimen: Nares from Nasopharyngeal Swab Updated: 05/13/22 1950     SARS-CoV-2 Negative     INFLUENZA A PCR Negative     INFLUENZA B PCR Negative     RSV PCR Negative    Narrative:      FOR PEDIATRIC PATIENTS - copy/paste COVID Guidelines URL to browser: [email or web address]    SARS-CoV-2 assay is a Nucleic Acid Amplification assay intended for the  qualitative detection of nucleic acid from SARS-CoV-2 in nasopharyngeal  swabs   Results are for the presumptive identification of SARS-CoV-2 RNA  Positive results are indicative of infection with SARS-CoV-2, the virus  causing COVID-19, but do not rule out bacterial infection or co-infection  with other viruses  Laboratories within the [place] and its  territories are required to report all positive results to the appropriate  public health authorities  Negative results do not preclude SARS-CoV-2  infection and should not be used as the sole basis for treatment or other  patient management decisions  Negative results must be combined with  clinical observations, patient history, and epidemiological information  This test has not been FDA cleared or approved  This test has been authorized by FDA under an Emergency Use Authorization  (EUA)  This test is only authorized for the duration of time the  declaration that circumstances exist justifying the authorization of the  emergency use of an in vitro diagnostic tests for detection of SARS-CoV-2  virus and/or diagnosis of COVID-19 infection under section 564(b)(1) of  the Act, 21 U  S C  190UVW-6(C)(7), unless the authorization is terminated  or revoked sooner  The test has been validated but independent review by FDA  and CLIA is pending  Test performed using [facility] GeneXpert: This RT-PCR assay targets N2,  a region unique to SARS-CoV-2  A conserved region in the E-gene was chosen  for pan-Sarbecovirus detection which includes SARS-CoV-2  CBC and differential [[id number]]  (Abnormal) Collected: 05/13/22 1733    Lab Status: Final result Specimen: Blood from Arm, Right Updated: 05/13/22 1905     WBC 10 56 Thousand/uL      RBC 3 91 Million/uL      Hemoglobin 12 4 g/dL      Hematocrit 38 1 %      MCV 97 fL      MCH 31 7 pg      MCHC 32 5 g/dL      RDW 14 1 %      MPV 9 3 fL      Platelets 809 Thousands/uL     Narrative: This is an appended report  These results have been appended to a previously verified report      Manual Differential(PHLEBS Do Not Order) [[id number]]  (Abnormal) Collected: 05/13/22 1733    Lab Status: Final result Specimen: Blood from Arm, Right Updated: 05/13/22 1905     Segmented % 60 %      Bands % 1 %      Lymphocytes % 26 %      Monocytes % 9 %      Eosinophils, % 1 %      Basophils % 0 %      Metamyelocytes% 3 %      Absolute Neutrophils 6 44 Thousand/uL      Lymphocytes Absolute 2 75 Thousand/uL      Monocytes Absolute 0 95 Thousand/uL      Eosinophils Absolute 0 11 Thousand/uL      Basophils Absolute 0 00 Thousand/uL      Total Counted --     Anisocytosis Present     Polychromasia Present     Platelet Estimate Adequate    UA w Reflex to Microscopic w Reflex to Culture [[id number]]  (Abnormal) Collected: 05/13/22 1733    Lab Status: Final result Specimen: Urine, Catheter Updated: 05/13/22 1845     Color, UA Yellow     Clarity, UA Clear     Specific Gravity, UA 1 025     pH, UA 6 0     Leukocytes, UA Moderate     Nitrite, UA Negative     Protein, UA Negative mg/dl      Glucose, UA Negative mg/dl      Ketones, UA Trace mg/dl      Urobilinogen, UA 1 0 E U /dl      Bilirubin, UA Negative     Blood, UA Negative    TSH [[id number]]  (Normal) Collected: 05/13/22 1733    Lab Status: Final result Specimen: Blood from Arm, Right Updated: 05/13/22 1822     TSH 3RD GENERATON 3 556 uIU/mL     Narrative:      Patients undergoing fluorescein dye angiography may retain small amounts of fluorescein in the body for 48-72 hours post procedure  Samples containing fluorescein can produce falsely depressed TSH values  If the patient had this procedure,a specimen should be resubmitted post fluorescein clearance        Comprehensive metabolic panel [[id number]]  (Abnormal) Collected: 05/13/22 1733    Lab Status: Final result Specimen: Blood from Arm, Right Updated: 05/13/22 1822     Sodium 134 mmol/L      Potassium 4 5 mmol/L      Chloride 99 mmol/L      CO2 29 mmol/L      ANION GAP 6 mmol/L      BUN 15 mg/dL      Creatinine 0 52 mg/dL      Glucose 99 mg/dL      Calcium 9 1 mg/dL      Corrected Calcium 9 6 mg/dL      AST 14 U/L      ALT 22 U/L      Alkaline Phosphatase 70 U/L      Total Protein 7 2 g/dL      Albumin 3 4 g/dL      Total Bilirubin 0 30 mg/dL      eGFR 110 ml/min/1 73sq m     Narrative:      [place] guidelines for Chronic Kidney Disease (CKD):     Stage 1 with normal or high GFR (GFR > 90 mL/min/1 73 square meters)    Stage 2 Mild CKD (GFR = 60-89 mL/min/1 73 square meters)    Stage 3A Moderate CKD (GFR = 45-59 mL/min/1 73 square meters)    Stage 3B Moderate CKD (GFR = 30-44 mL/min/1 73 square meters)    Stage 4 Severe CKD (GFR = 15-29 mL/min/1 73 square meters)    Stage 5 End Stage CKD (GFR <15 mL/min/1 73 square meters)  Note: GFR calculation is accurate only with a steady state creatinine    Rapid drug screen, urine [[id number]]  (Normal) Collected: 05/13/22 1733    Lab Status: Final result Specimen: Urine, Catheter Updated: 05/13/22 1807     Amph/Meth UR Negative     Barbiturate Ur Negative     Benzodiazepine Urine Negative     Cocaine Urine Negative     Methadone Urine Negative     Opiate Urine Negative     PCP Ur Negative     THC Urine Negative     Oxycodone Urine Negative    Narrative:      FOR MEDICAL PURPOSES ONLY  IF CONFIRMATION NEEDED PLEASE CONTACT THE LAB WITHIN 5 DAYS      Drug Screen Cutoff Levels:  AMPHETAMINE/METHAMPHETAMINES  1000 ng/mL  BARBITURATES     200 ng/mL  BENZODIAZEPINES     200 ng/mL  COCAINE      300 ng/mL  METHADONE      300 ng/mL  OPIATES      300 ng/mL  PHENCYCLIDINE     25 ng/mL  THC       50 ng/mL  OXYCODONE      100 ng/mL                 No orders to display              Procedures  ECG 12 Lead Documentation Only    Date/Time: 5/13/2022 5:38 PM  Performed by: Lainey Milner DO  Authorized by: Lainey Milner DO     Indications / Diagnosis:  Psychiatric eval  ECG reviewed by me, the ED Provider: yes    Patient location:  ED  Previous ECG:     Previous ECG:  Compared to current    Comparison ECG info:  4/28/22    Similarity:  No change  Interpretation:     Interpretation: normal    Rate:     ECG rate:  93    ECG rate assessment: normal    Rhythm:     Rhythm: sinus rhythm    Ectopy:     Ectopy: none    QRS:     QRS axis:  Normal    QRS intervals:  Normal  Conduction:     Conduction: normal    ST segments:     ST segments:  Normal  T waves:     T waves: normal    Comments:      qtc 440             ED Course  ED Course as of 05/13/22 2155   Fri May 13, 2022   2154 Patient care signed out to Dr Valeria Hogan  Patient is a 46 yo F who p/w SI w/ plan  Medically cleared  201 signed  Home meds ordered  Bed search in progress  MDM  Number of Diagnoses or Management Options  Suicidal ideation  Diagnosis management comments: Assessment and plan:  36-year-old female presenting with suicidal ideations  Will get labs for medical clearance and have crisis evaluate  Disposition  Final diagnoses:   Suicidal ideation     Time reflects when diagnosis was documented in both MDM as applicable and the Disposition within this note     Time User Action Codes Description Comment    5/13/2022  5:12 PM Rupa Yica Add [Q88 [id number]] Suicidal ideation       ED Disposition     ED Disposition   Transfer to [address] [place] [address]   --    Date/Time   Fri May 13, 2022  5:36 PM    Comment   Ric Preston has been medically cleared and is pending crisis evaluation  Follow-up Information    None         Patient's Medications   Discharge Prescriptions    No medications on file       No discharge procedures on file      PDMP Review       Value Time User    PDMP Reviewed  Yes 5/6/2022 10:38 AM Harry Malik MD          ED Provider  Electronically Signed by           Mickie Bob DO  05/13/22 Jocelynn OSS HealthDO  05/13/22 2358

## 2022-05-14 DIAGNOSIS — F31.9 BIPOLAR 1 DISORDER (HCC): Primary | ICD-10-CM

## 2022-05-14 LAB
ATRIAL RATE: 93 BPM
P AXIS: 13 DEGREES
PR INTERVAL: 162 MS
QRS AXIS: 36 DEGREES
QRSD INTERVAL: 78 MS
QT INTERVAL: 354 MS
QTC INTERVAL: 440 MS
T WAVE AXIS: 47 DEGREES
VENTRICULAR RATE: 93 BPM

## 2022-05-14 PROCEDURE — 93010 ELECTROCARDIOGRAM REPORT: CPT | Performed by: INTERNAL MEDICINE

## 2022-05-14 RX ORDER — PROPRANOLOL HYDROCHLORIDE 10 MG/1
10 TABLET ORAL EVERY 8 HOURS PRN
Status: CANCELLED | OUTPATIENT
Start: 2022-05-14

## 2022-05-14 RX ORDER — HYDROXYZINE 50 MG/1
50 TABLET, FILM COATED ORAL
Status: CANCELLED | OUTPATIENT
Start: 2022-05-14

## 2022-05-14 RX ORDER — DIPHENHYDRAMINE HYDROCHLORIDE 50 MG/ML
50 INJECTION INTRAMUSCULAR; INTRAVENOUS EVERY 6 HOURS PRN
Status: CANCELLED | OUTPATIENT
Start: 2022-05-14

## 2022-05-14 RX ORDER — OLANZAPINE 2.5 MG/1
2.5 TABLET ORAL
Status: CANCELLED | OUTPATIENT
Start: 2022-05-14

## 2022-05-14 RX ORDER — HYDROXYZINE HYDROCHLORIDE 25 MG/1
25 TABLET, FILM COATED ORAL
Status: CANCELLED | OUTPATIENT
Start: 2022-05-14

## 2022-05-14 RX ORDER — ACETAMINOPHEN 325 MG/1
650 TABLET ORAL EVERY 6 HOURS PRN
Status: CANCELLED | OUTPATIENT
Start: 2022-05-14

## 2022-05-14 RX ORDER — OLANZAPINE 5 MG/1
5 TABLET ORAL
Status: CANCELLED | OUTPATIENT
Start: 2022-05-14

## 2022-05-14 RX ORDER — OLANZAPINE 10 MG/1
5 INJECTION, POWDER, LYOPHILIZED, FOR SOLUTION INTRAMUSCULAR
Status: CANCELLED | OUTPATIENT
Start: 2022-05-14

## 2022-05-14 RX ORDER — LORAZEPAM 2 MG/ML
2 INJECTION INTRAMUSCULAR EVERY 6 HOURS PRN
Status: CANCELLED | OUTPATIENT
Start: 2022-05-14

## 2022-05-14 RX ORDER — OLANZAPINE 10 MG/1
10 INJECTION, POWDER, LYOPHILIZED, FOR SOLUTION INTRAMUSCULAR
Status: CANCELLED | OUTPATIENT
Start: 2022-05-14

## 2022-05-14 RX ORDER — BENZTROPINE MESYLATE 1 MG/1
1 TABLET ORAL
Status: CANCELLED | OUTPATIENT
Start: 2022-05-14

## 2022-05-14 RX ORDER — OLANZAPINE 10 MG/1
2.5 INJECTION, POWDER, LYOPHILIZED, FOR SOLUTION INTRAMUSCULAR
Status: CANCELLED | OUTPATIENT
Start: 2022-05-14

## 2022-05-14 RX ORDER — OLANZAPINE 10 MG/1
10 TABLET ORAL
Status: CANCELLED | OUTPATIENT
Start: 2022-05-14

## 2022-05-14 RX ORDER — HYDROXYZINE 50 MG/1
100 TABLET, FILM COATED ORAL
Status: CANCELLED | OUTPATIENT
Start: 2022-05-14

## 2022-05-14 RX ORDER — ACETAMINOPHEN 325 MG/1
650 TABLET ORAL EVERY 4 HOURS PRN
Status: CANCELLED | OUTPATIENT
Start: 2022-05-14

## 2022-05-14 RX ORDER — ACETAMINOPHEN 325 MG/1
975 TABLET ORAL EVERY 6 HOURS PRN
Status: CANCELLED | OUTPATIENT
Start: 2022-05-14

## 2022-05-14 RX ADMIN — PRAZOSIN HYDROCHLORIDE 2 MG: 1 CAPSULE ORAL at 11:07

## 2022-05-14 RX ADMIN — HALOPERIDOL 10 MG: 10 TABLET ORAL at 11:07

## 2022-05-14 RX ADMIN — ATORVASTATIN CALCIUM 40 MG: 40 TABLET, FILM COATED ORAL at 19:33

## 2022-05-14 RX ADMIN — Medication 3 MG: at 22:04

## 2022-05-14 RX ADMIN — DIVALPROEX SODIUM 2500 MG: 500 TABLET, EXTENDED RELEASE ORAL at 22:04

## 2022-05-14 RX ADMIN — MIRTAZAPINE 30 MG: 15 TABLET, FILM COATED ORAL at 22:04

## 2022-05-14 RX ADMIN — LEVOCARNITINE 1000 MG: 1 SOLUTION ORAL at 11:12

## 2022-05-14 RX ADMIN — SENNOSIDES 8.6 MG: 8.6 TABLET ORAL at 19:33

## 2022-05-14 RX ADMIN — SENNOSIDES 8.6 MG: 8.6 TABLET ORAL at 11:07

## 2022-05-14 RX ADMIN — ASPIRIN 81 MG CHEWABLE TABLET 81 MG: 81 TABLET CHEWABLE at 11:07

## 2022-05-14 RX ADMIN — LISINOPRIL 20 MG: 20 TABLET ORAL at 11:07

## 2022-05-14 RX ADMIN — HALOPERIDOL 10 MG: 10 TABLET ORAL at 19:33

## 2022-05-14 NOTE — CONSULTS
TeleConsultation - [facility] 47 y o  female MRN: [id number]  Unit/Bed#: Jessica Antonio 01 Encounter: [id number]        REQUIRED DOCUMENTATION:     1  This service was provided via Telemedicine  2  Provider located at [place]   3  TeleMed provider: Suman Aranda MD   4  Identify all parties in room with patient during tele consult:  patient  5  Patient was then informed that this was a Telemedicine visit and that the exam was being conducted confidentially over secure lines  My office door was closed  No one else was in the room  Patient acknowledged consent and understanding of privacy and security of the Telemedicine visit, and gave us permission to have the assistant stay in the room in order to assist with the history and to conduct the exam   I informed the patient that I have reviewed their record in Epic and presented the opportunity for them to ask any questions regarding the visit today  The patient agreed to participate  Assessment/Plan     Assessment:  Patient is a 47 y o  female, unmarried with no children, domiciled in assisted living facility [address] [place] [address] past medical history of seizure disorder (last seizure 5 years ago), HLD, HTN, self-reported psychiatric history of MDD, PTSD who presents voluntarily for worsening depression, suicidal ideation and auditory hallucinations  Patient was recently discharged from the hospital on depakote, remeron, haldol and prazosin however says the CAH makes her suicidal  She recently superficially scratched her wrist longitudinally with a thumbtack  Plan:   Admit to inpatient psychiatry 201  Continue home meds  1:1 for self injurious behavior    Chief Complaint: "I was hearing voices telling me to hurt myself"     History of Present Illness     Reason for Consult / Principal Problem: suicidality  Patient reports CAH to kill herself  She states that if she is discharged that she will commit suicide   She noted a longitudinal scar that she used a thumbtack to scratch   /  Consults    Psychiatric Review Of Systems:  sleep: yes  appetite changes: no  weight changes: no  energy/anergy: no  interest/pleasure/anhedonia: no  somatic symptoms: no  anxiety/panic: no  otoniel: no  guilty/hopeless: no  self injurious behavior/risky behavior: yes    Historical Information   Past Psychiatric History:   Severalhospitalizations, self injurious behavior and suicidality/suicide attempts    Substance Abuse History:  none    Family Psychiatric History:   deferred    Social History  In group home    Traumatic History:   Unknown      Past Medical History:   Diagnosis Date    Anxiety     Bipolar 1 disorder ([place] [address] )     Bipolar affective disorder, depressed, severe ([place] [address] ) 10/10/2021    Borderline personality disorder ([place] [address] )     CAD (coronary artery disease)     Cognitive impairment     Depression     Dyslipidemia     GERD (gastroesophageal reflux disease)     Hypertension     Obesity     Psychiatric disorder     PTSD (post-traumatic stress disorder)     Schizoaffective disorder (HCC)     Seizure ([place] [address] )        Medical Review Of Systems:  Review of Systems    Meds/Allergies   all current active meds have been reviewed  Allergies   Allergen Reactions    Fish Oil - Food Allergy Other (See Comments)     unknown    Fish-Derived Products - Food Allergy Hives       Objective   Vital signs in last 24 hours:  Temp:  [97 °F (36 1 °C)-97 5 °F (36 4 °C)] 97 5 °F (36 4 °C)  HR:  [88-98] 91  Resp:  [16-18] 18  BP: (125-136)/(70-75) 128/70    No intake or output data in the 24 hours ending 05/14/22 1657    Mental Status Evaluation:  Appearance:  age appropriate   Behavior:  normal   Speech:  normal volume   Mood:  normal   Affect:  normal, flattened   Language: wnl   Thought Process:  concrete   Thought Content:  normal   Perceptual Disturbances:  Auditory hallucinations with commands kill/hurt self   Risk Potential: Suicidal Ideations with plan jump into traffic   Sensorium:  person, place and situation   Cognition:  recent and remote memory grossly intact   Consciousness:  awake    Attention: attention span appeared shorter than expected for age   Intellect: decreased   Fund of Knowledge: awareness of current events: nottested   Insight:  limited   Judgment: limited   Muscle Strength and Tone: face   Gait/Station: not observed   Motor Activity: no abnormal movements     Lab Results: reviewed  Imaging Studies: reviewed  EKG, Pathology, and Other Studies: reviewed    Code Status: Prior  Advance Directive and Living Will:      Power of :    POLST:      Counseling / Coordination of Care  Total floor / unit time spent today 60 minutes  Greater than 50% of total time was spent with the patient and / or family counseling and / or coordination of care   A description of the counseling / coordination of care: plan

## 2022-05-14 NOTE — ED CARE HANDOFF
Emergency Department Sign Out Note        Sign out and transfer of care from Dr Jalen Watson  See Separate Emergency Department note  The patient, Sanjuanita Brownlee, was evaluated by the previous provider for suicidal ideation  Workup Completed:  Patient medically cleared for inpatient psychiatric admission  Crisis consulted  201 signed  Psychiatry consult pending  ED Course / Workup Pending (followup): Patient accepted at [facility]  Procedures  MDM        Disposition  Final diagnoses:   Suicidal ideation     Time reflects when diagnosis was documented in both MDM as applicable and the Disposition within this note     Time User Action Codes Description Comment    5/13/2022  5:12 PM Nieves Garcia Add [L06 [id number]] Suicidal ideation       ED Disposition     ED Disposition   Transfer to [address] [place] [address]   --    Date/Time   Sat May 14, 2022  7:45 PM    Comment   Sanjuanita Brownlee should be transferred out to [facility] and has been medically cleared               MD Nathan Love Most Recent Value   Patient Condition The patient has been stabilized such that within reasonable medical probability, no material deterioration of the patient condition or the condition of the unborn child(courtney) is likely to result from the transfer   Benefits of Transfer Specialized equipment and/or services available at the receiving facility (Include comment)________________________  [inpatient psychiatry]   Risks of Transfer Potential for delay in receiving treatment, Potential deterioration of medical condition, Increased discomfort during transfer, Possible worsening of condition or death during transfer   Accepting Physician Dr Sheilda Nissen Name, Graham Vincent   Sending MD Dr Karan Bernardo   Provider Certification General risk, such as traffic hazards, adverse weather conditions, rough terrain or turbulence, possible failure of equipment (including vehicle or aircraft), or consequences of actions of persons outside the control of the transport personnel, Unanticipated needs of medical equipment and personnel during transport, Risk of worsening condition, The possibility of a transport vehicle being unavailable      RN Documentation    [address]and[address]ra Kebede Name, [address] [facility]      Follow-up Information    None       Patient's Medications   Discharge Prescriptions    No medications on file     No discharge procedures on file         ED Provider  Electronically Signed by     Gloria Thomason MD  05/14/22 4255

## 2022-05-14 NOTE — ED NOTES
Bed Search:     [facility]e Treviño) no beds  [place] (no answer)  [facility] beds  [facility] 006/160 adult M/F beds  Adriano Carmichael) no beds  Masha Spence [facility]) no beds; check in AM

## 2022-05-14 NOTE — EMTALA/ACUTE CARE TRANSFER
[facility] EMERGENCY DEPARTMENT  [address]  [place] [facility] [address]  Dept: [phone]      EMTALA TRANSFER CONSENT    NAME Leydi García                                         1970                              MRN [id number]    I have been informed of my rights regarding examination, treatment, and transfer   by Dr Donna Quintanilla att  providers found    Benefits: Specialized equipment and/or services available at the receiving facility (Include comment)________________________ (inpatient psychiatry)    Risks: Potential for delay in receiving treatment, Potential deterioration of medical condition, Increased discomfort during transfer, Possible worsening of condition or death during transfer      Consent for Transfer:  I acknowledge that my medical condition has been evaluated and explained to me by the emergency department physician or other qualified medical person and/or my attending physician, who has recommended that I be transferred to the service of  Accepting Physician: Dr William Ramirez at [facility] Name, [address] : [address]  The above potential benefits of such transfer, the potential risks associated with such transfer, and the probable risks of not being transferred have been explained to me, and I fully understand them  The doctor has explained that, in my case, the benefits of transfer outweigh the risks  I agree to be transferred  I authorize the performance of emergency medical procedures and treatments upon me in both transit and upon arrival at the receiving facility  Additionally, I authorize the release of any and all medical records to the receiving facility and request they be transported with me, if possible  I understand that the safest mode of transportation during a medical emergency is an ambulance and that the Hospital advocates the use of this mode of transport   Risks of traveling to the receiving facility by car, including absence of medical control, life sustaining equipment, such as oxygen, and medical personnel has been explained to me and I fully understand them  (PRAKASH CORRECT BOX BELOW)  [  ]  I consent to the stated transfer and to be transported by ambulance/helicopter  [  ]  I consent to the stated transfer, but refuse transportation by ambulance and accept full responsibility for my transportation by car  I understand the risks of non-ambulance transfers and I exonerate the Hospital and its staff from any deterioration in my condition that results from this refusal     X___________________________________________    DATE  22  TIME________  Signature of patient or legally responsible individual signing on patient behalf           RELATIONSHIP TO PATIENT_________________________          Provider Certification    NAME Leydi Torres                                         1970                              MRN [id number]    A medical screening exam was performed on the above named patient  Based on the examination:    Condition Necessitating Transfer The encounter diagnosis was Suicidal ideation      Patient Condition: The patient has been stabilized such that within reasonable medical probability, no material deterioration of the patient condition or the condition of the unborn child(courtney) is likely to result from the transfer    Reason for Transfer:      Transfer Requirements: [address]   · Space available and qualified personnel available for treatment as acknowledged by    · Agreed to accept transfer and to provide appropriate medical treatment as acknowledged by       Dr Rolanda Kehr  · Appropriate medical records of the examination and treatment of the patient are provided at the time of transfer   [address] _______  · Transfer will be performed by qualified personnel from    and appropriate transfer equipment as required, including the use of necessary and appropriate life support measures  Provider Certification: I have examined the patient and explained the following risks and benefits of being transferred/refusing transfer to the patient/family:  General risk, such as traffic hazards, adverse weather conditions, rough terrain or turbulence, possible failure of equipment (including vehicle or aircraft), or consequences of actions of persons outside the control of the transport personnel, Unanticipated needs of medical equipment and personnel during transport, Risk of worsening condition, The possibility of a transport vehicle being unavailable      Based on these reasonable risks and benefits to the patient and/or the unborn child(courtney), and based upon the information available at the time of the patients examination, I certify that the medical benefits reasonably to be expected from the provision of appropriate medical treatments at another medical facility outweigh the increasing risks, if any, to the individuals medical condition, and in the case of labor to the unborn child, from effecting the transfer      X____________________________________________ DATE 05/14/22        TIME_______      ORIGINAL - SEND TO MEDICAL RECORDS   COPY - SEND WITH PATIENT DURING TRANSFER

## 2022-05-14 NOTE — ED NOTES
Patient is accepted at [facility]  Patient is accepted by Dr Brianna Campbell per Peter Matos  Transportation is arranged with [facility]  Transportation is scheduled for anytime  Patient may go to the floor at anytime

## 2022-05-14 NOTE — ED NOTES
PC from [place], [address] [place] [address] Dr Bello has "exhausted therapeutic benefits with Christopher " Not able to consider

## 2022-05-14 NOTE — ED NOTES
JASMYN Balderas    :[id number]    [facility] [place] 05/14/2022     [address] 05/14/2022     Other or Additional Payor MEDICARE PART B 05/14/2022 05/14/2022  Other or Additional Payor MEDICARE PART A 05/14/2022 05/14/2022

## 2022-05-15 ENCOUNTER — HOSPITAL ENCOUNTER (INPATIENT)
Facility: HOSPITAL | Age: 52
LOS: 3 days | Discharge: HOME | DRG: 885 | End: 2022-05-18
Attending: STUDENT IN AN ORGANIZED HEALTH CARE EDUCATION/TRAINING PROGRAM | Admitting: GENERAL PRACTICE
Payer: MEDICARE

## 2022-05-15 VITALS
SYSTOLIC BLOOD PRESSURE: 132 MMHG | DIASTOLIC BLOOD PRESSURE: 61 MMHG | WEIGHT: 278 LBS | HEIGHT: 67 IN | BODY MASS INDEX: 43.63 KG/M2 | OXYGEN SATURATION: 98 % | TEMPERATURE: 97.1 F | HEART RATE: 88 BPM | RESPIRATION RATE: 18 BRPM

## 2022-05-15 DIAGNOSIS — I10 ESSENTIAL HYPERTENSION: ICD-10-CM

## 2022-05-15 DIAGNOSIS — F31.9 BIPOLAR 1 DISORDER (HCC): ICD-10-CM

## 2022-05-15 DIAGNOSIS — E78.5 HYPERLIPIDEMIA, UNSPECIFIED HYPERLIPIDEMIA TYPE: ICD-10-CM

## 2022-05-15 DIAGNOSIS — E78.5 HYPERLIPIDEMIA: ICD-10-CM

## 2022-05-15 DIAGNOSIS — F32.3 MAJOR DEPRESSION WITH PSYCHOTIC FEATURES (HCC): Primary | ICD-10-CM

## 2022-05-15 RX ORDER — OLANZAPINE 2.5 MG/1
2.5 TABLET ORAL
Status: DISCONTINUED | OUTPATIENT
Start: 2022-05-15 | End: 2022-05-18 | Stop reason: HOSPADM

## 2022-05-15 RX ORDER — ACETAMINOPHEN 325 MG/1
650 TABLET ORAL EVERY 6 HOURS PRN
Status: DISCONTINUED | OUTPATIENT
Start: 2022-05-15 | End: 2022-05-18 | Stop reason: HOSPADM

## 2022-05-15 RX ORDER — OLANZAPINE 2.5 MG/1
2.5 TABLET ORAL
Status: DISCONTINUED | OUTPATIENT
Start: 2022-05-15 | End: 2022-05-17

## 2022-05-15 RX ORDER — ACETAMINOPHEN 325 MG/1
975 TABLET ORAL EVERY 6 HOURS PRN
Status: DISCONTINUED | OUTPATIENT
Start: 2022-05-15 | End: 2022-05-18 | Stop reason: HOSPADM

## 2022-05-15 RX ORDER — HYDROXYZINE 50 MG/1
100 TABLET, FILM COATED ORAL
Status: DISCONTINUED | OUTPATIENT
Start: 2022-05-15 | End: 2022-05-18 | Stop reason: HOSPADM

## 2022-05-15 RX ORDER — OLANZAPINE 5 MG/1
5 TABLET ORAL
Status: DISCONTINUED | OUTPATIENT
Start: 2022-05-15 | End: 2022-05-17

## 2022-05-15 RX ORDER — OLANZAPINE 10 MG/1
5 INJECTION, POWDER, LYOPHILIZED, FOR SOLUTION INTRAMUSCULAR
Status: DISCONTINUED | OUTPATIENT
Start: 2022-05-15 | End: 2022-05-18 | Stop reason: HOSPADM

## 2022-05-15 RX ORDER — PROPRANOLOL HYDROCHLORIDE 10 MG/1
10 TABLET ORAL EVERY 8 HOURS PRN
Status: DISCONTINUED | OUTPATIENT
Start: 2022-05-15 | End: 2022-05-18 | Stop reason: HOSPADM

## 2022-05-15 RX ORDER — HYDROXYZINE 50 MG/1
50 TABLET, FILM COATED ORAL
Status: DISCONTINUED | OUTPATIENT
Start: 2022-05-15 | End: 2022-05-18 | Stop reason: HOSPADM

## 2022-05-15 RX ORDER — OLANZAPINE 5 MG/1
5 TABLET ORAL
Status: DISCONTINUED | OUTPATIENT
Start: 2022-05-15 | End: 2022-05-18 | Stop reason: HOSPADM

## 2022-05-15 RX ORDER — OLANZAPINE 10 MG/1
2.5 INJECTION, POWDER, LYOPHILIZED, FOR SOLUTION INTRAMUSCULAR
Status: DISCONTINUED | OUTPATIENT
Start: 2022-05-15 | End: 2022-05-17

## 2022-05-15 RX ORDER — DIPHENHYDRAMINE HYDROCHLORIDE 50 MG/ML
50 INJECTION INTRAMUSCULAR; INTRAVENOUS EVERY 6 HOURS PRN
Status: DISCONTINUED | OUTPATIENT
Start: 2022-05-15 | End: 2022-05-18 | Stop reason: HOSPADM

## 2022-05-15 RX ORDER — OLANZAPINE 10 MG/1
10 TABLET ORAL
Status: DISCONTINUED | OUTPATIENT
Start: 2022-05-15 | End: 2022-05-17

## 2022-05-15 RX ORDER — HYDROXYZINE HYDROCHLORIDE 25 MG/1
25 TABLET, FILM COATED ORAL
Status: DISCONTINUED | OUTPATIENT
Start: 2022-05-15 | End: 2022-05-18 | Stop reason: HOSPADM

## 2022-05-15 RX ORDER — BENZTROPINE MESYLATE 1 MG/1
1 TABLET ORAL
Status: DISCONTINUED | OUTPATIENT
Start: 2022-05-15 | End: 2022-05-18 | Stop reason: HOSPADM

## 2022-05-15 RX ORDER — LORAZEPAM 2 MG/ML
2 INJECTION INTRAMUSCULAR EVERY 6 HOURS PRN
Status: DISCONTINUED | OUTPATIENT
Start: 2022-05-15 | End: 2022-05-18 | Stop reason: HOSPADM

## 2022-05-15 RX ORDER — ACETAMINOPHEN 325 MG/1
650 TABLET ORAL EVERY 4 HOURS PRN
Status: DISCONTINUED | OUTPATIENT
Start: 2022-05-15 | End: 2022-05-18 | Stop reason: HOSPADM

## 2022-05-15 RX ORDER — OLANZAPINE 10 MG/1
5 INJECTION, POWDER, LYOPHILIZED, FOR SOLUTION INTRAMUSCULAR
Status: DISCONTINUED | OUTPATIENT
Start: 2022-05-15 | End: 2022-05-17

## 2022-05-15 RX ORDER — LANOLIN ALCOHOL/MO/W.PET/CERES
3 CREAM (GRAM) TOPICAL
Status: CANCELLED | OUTPATIENT
Start: 2022-05-15

## 2022-05-15 RX ORDER — OLANZAPINE 10 MG/1
10 INJECTION, POWDER, LYOPHILIZED, FOR SOLUTION INTRAMUSCULAR
Status: DISCONTINUED | OUTPATIENT
Start: 2022-05-15 | End: 2022-05-17

## 2022-05-15 RX ADMIN — ASPIRIN 81 MG CHEWABLE TABLET 81 MG: 81 TABLET CHEWABLE at 09:05

## 2022-05-15 RX ADMIN — SENNOSIDES 8.6 MG: 8.6 TABLET ORAL at 09:06

## 2022-05-15 RX ADMIN — LISINOPRIL 20 MG: 20 TABLET ORAL at 09:10

## 2022-05-15 RX ADMIN — PRAZOSIN HYDROCHLORIDE 2 MG: 1 CAPSULE ORAL at 09:10

## 2022-05-15 RX ADMIN — HALOPERIDOL 10 MG: 10 TABLET ORAL at 09:06

## 2022-05-15 RX ADMIN — LEVOCARNITINE 1000 MG: 1 SOLUTION ORAL at 09:05

## 2022-05-15 NOTE — NURSING NOTE
47 yr old white obese female adm to [facility] C/O fleeting voices telling her to hurt herself denies SI at this time will come to staff if feeling unsafe pt is well known on this unit has had many admissions  To [facility], has long scratch about 6 inches  Done with thumb  Tack pleasant and cooperative

## 2022-05-15 NOTE — NURSING NOTE
Upon arrival pt had no wallet or cellphone  Locker   tj max tote bag      Dress (tank top sleeves)  Shoes pink and gray  Pen paper   Straw paper   Bowl   Bedside   Black pants   Pink shirt   Dress   Blue and black shirt   Plain black sherine  Light blue pants   Purple shirt

## 2022-05-16 PROCEDURE — 99221 1ST HOSP IP/OBS SF/LOW 40: CPT | Performed by: STUDENT IN AN ORGANIZED HEALTH CARE EDUCATION/TRAINING PROGRAM

## 2022-05-16 PROCEDURE — 99222 1ST HOSP IP/OBS MODERATE 55: CPT

## 2022-05-16 RX ORDER — PRAZOSIN HYDROCHLORIDE 1 MG/1
2 CAPSULE ORAL
Status: DISCONTINUED | OUTPATIENT
Start: 2022-05-16 | End: 2022-05-18 | Stop reason: HOSPADM

## 2022-05-16 RX ORDER — LISINOPRIL 20 MG/1
20 TABLET ORAL DAILY
Status: DISCONTINUED | OUTPATIENT
Start: 2022-05-16 | End: 2022-05-18 | Stop reason: HOSPADM

## 2022-05-16 RX ORDER — ATORVASTATIN CALCIUM 40 MG/1
40 TABLET, FILM COATED ORAL
Status: DISCONTINUED | OUTPATIENT
Start: 2022-05-16 | End: 2022-05-18 | Stop reason: HOSPADM

## 2022-05-16 RX ORDER — DIVALPROEX SODIUM 500 MG/1
2500 TABLET, EXTENDED RELEASE ORAL
Status: DISCONTINUED | OUTPATIENT
Start: 2022-05-16 | End: 2022-05-18 | Stop reason: HOSPADM

## 2022-05-16 RX ORDER — MIRTAZAPINE 15 MG/1
30 TABLET, FILM COATED ORAL
Status: DISCONTINUED | OUTPATIENT
Start: 2022-05-16 | End: 2022-05-18 | Stop reason: HOSPADM

## 2022-05-16 RX ORDER — ASPIRIN 81 MG/1
81 TABLET ORAL DAILY
Status: DISCONTINUED | OUTPATIENT
Start: 2022-05-16 | End: 2022-05-18 | Stop reason: HOSPADM

## 2022-05-16 RX ORDER — HALOPERIDOL 10 MG/1
10 TABLET ORAL 2 TIMES DAILY
Status: DISCONTINUED | OUTPATIENT
Start: 2022-05-16 | End: 2022-05-18 | Stop reason: HOSPADM

## 2022-05-16 RX ADMIN — PRAZOSIN HYDROCHLORIDE 2 MG: 1 CAPSULE ORAL at 21:18

## 2022-05-16 RX ADMIN — MIRTAZAPINE 30 MG: 15 TABLET, FILM COATED ORAL at 21:17

## 2022-05-16 RX ADMIN — HALOPERIDOL 10 MG: 10 TABLET ORAL at 17:02

## 2022-05-16 RX ADMIN — LISINOPRIL 20 MG: 20 TABLET ORAL at 09:42

## 2022-05-16 RX ADMIN — ASPIRIN 81 MG: 81 TABLET, COATED ORAL at 09:42

## 2022-05-16 RX ADMIN — ATORVASTATIN CALCIUM 40 MG: 40 TABLET, FILM COATED ORAL at 17:02

## 2022-05-16 RX ADMIN — OLANZAPINE 10 MG: 10 TABLET, FILM COATED ORAL at 09:59

## 2022-05-16 RX ADMIN — DIVALPROEX SODIUM 2500 MG: 500 TABLET, EXTENDED RELEASE ORAL at 21:17

## 2022-05-16 NOTE — CONSULTS
[address] 1970, 47 y o  female MRN: [id number]  Unit/Bed#: [facility] 252-01 Encounter: [id number]  Primary Care Provider: Franklin Jaime MD   Date and time admitted to hospital: 5/15/2022  3:28 PM    Inpatient consult for Medical Clearance for [address] patient  Consult performed by: Cari Prakash PA-C  Consult ordered by: Andrés Saldana MD          Medical clearance for psychiatric admission  Assessment & Plan   Admission labs reviewed, acceptable   Vitals stable    UDS negative    COVID-19 negative   EKG 5/13 reveals NSR,    Medically stable for continued inpatient psychiatric treatment based on available results      Hyperlipidemia  Assessment & Plan  · Continue home atorvastatin 40 mg daily    Hyponatremia  Assessment & Plan  · History of hyponatremia previously maintained on 2L fluid restriction, continue   · Na 134 on admission, acceptable     Seizures ([address] )  Assessment & Plan  · Continue home depakote ER 2500 mg HS   · Outpatient follow up with neurology     Primary hypertension  Assessment & Plan  · Continue home lisinopril 20 mg daily   · SBP on admission 115-130s      Counseling / Coordination of Care Time: 30 minutes  Greater than 50% of total time spent on patient counseling and coordination of care  Collaboration of Care: Were Recommendations Directly Discussed with Primary Treatment Team? - No     History of Present Illness:    James Nicole is a 47 y o  female with PMH of seizures, HTN, HLD, hyponatremia, and extensive psychiatrc chistory recently discharged from [address] [place] [address] who is originally admitted to the psychiatry service due to [address]  We are consulted for medical clearance for admission to [facility] Unit and treatment of underlying psychiatric illness  Patient should continue all prior to admission medications as prescribed by primary care provider/outpatient specialists    Patient denies smoking, drinking or drug use   Available admission lab work and vitals are acceptable  Patient states she feels fatigue as well as L sided LBP and L hip pain but otherwise denies physical complaints and states she feels a baseline physical health  Patient appears medically stable for inpatient psychiatric treatment at this time  Review of Systems:    Review of Systems   Constitutional: Positive for fatigue  Negative for chills and fever  HENT: Negative for congestion, rhinorrhea and sore throat  Eyes: Negative for visual disturbance  Respiratory: Negative for cough, chest tightness, shortness of breath and wheezing  Cardiovascular: Negative for chest pain and palpitations  Gastrointestinal: Negative for abdominal pain, constipation, diarrhea, nausea and vomiting  Genitourinary: Negative for difficulty urinating, dysuria, frequency and urgency  Musculoskeletal: Positive for arthralgias and back pain  Negative for myalgias  Skin: Negative for rash and wound  Neurological: Negative for dizziness, light-headedness and headaches  All other systems reviewed and are negative  Past Medical and Surgical History:     Past Medical History:   Diagnosis Date    Anxiety     Bipolar 1 disorder ([place] [address] )     Bipolar affective disorder, depressed, severe ([place] [address] ) 10/10/2021    Borderline personality disorder ([place] [address] )     CAD (coronary artery disease)     Cognitive impairment     Depression     Dyslipidemia     GERD (gastroesophageal reflux disease)     Hypertension     Obesity     Psychiatric disorder     Psychiatric illness     PTSD (post-traumatic stress disorder)     Schizoaffective disorder (HCC)     Seizure ([place] [address] )        Past Surgical History:   Procedure Laterality Date    APPENDECTOMY      PATIENT DENIES HAVING APPENDIX REMOVED    CHOLECYSTECTOMY      FOOT SURGERY Right        Meds/Allergies:    PTA meds:   Prior to Admission Medications   Prescriptions Last Dose Informant Patient Reported?  Taking?   aspirin 81 mg chewable tablet   No No   Sig: Chew 1 tablet (81 mg total) daily   atorvastatin (LIPITOR) 40 mg tablet   No No   Sig: Take 1 tablet (40 mg total) by mouth daily with dinner   cholecalciferol (VITAMIN D3) 1,000 units tablet   No No   Sig: Take 1 tablet (1,000 Units total) by mouth daily   divalproex sodium (DEPAKOTE ER) 500 mg 24 hr tablet   No No   Sig: Take 5 tablets (2,500 mg total) by mouth daily at bedtime   haloperidol (HALDOL) 10 mg tablet   No No   Sig: Take 1 tablet (10 mg total) by mouth 2 (two) times a day Take 1 tablet in the morning and 1 tablet before bedtime   ibuprofen (MOTRIN) 600 mg tablet   No No   Sig: Take 1 tablet (600 mg total) by mouth every 6 (six) hours as needed for moderate pain   levOCARNitine (CARNITOR) 1 g/10 mL solution   No No   Sig: Take 3 3 mL (330 mg total) by mouth in the morning   lisinopril (ZESTRIL) 20 mg tablet   No No   Sig: Take 1 tablet (20 mg total) by mouth daily   melatonin 3 mg   No No   Sig: Take 1 tablet (3 mg total) by mouth daily at bedtime as needed (insomnia)   mirtazapine (REMERON) 30 mg tablet   No No   Sig: Take 1 tablet (30 mg total) by mouth daily at bedtime   prazosin (MINIPRESS) 2 mg capsule   No No   Sig: Take 1 capsule (2 mg total) by mouth daily at bedtime   senna-docusate sodium (SENOKOT S) 8 6-50 mg per tablet   No No   Sig: Take 1 tablet by mouth 2 (two) times a day      Facility-Administered Medications: None       Allergies:    Allergies   Allergen Reactions    Fish Oil - Food Allergy Other (See Comments)     unknown    Fish-Derived Products - Food Allergy Hives       Social History:     Marital Status: Single    Substance Use History:   Social History     Substance and Sexual Activity   Alcohol Use Not Currently     Social History     Tobacco Use   Smoking Status Never Smoker   Smokeless Tobacco Never Used   Tobacco Comment    Pt stated "smokes when stressed"     Social History     Substance and Sexual Activity   Drug Use Not Currently Family History:    Family History   Problem Relation Age of Onset    Kidney cancer Mother     Cerebral aneurysm Father        Physical Exam:     Vitals:   Blood Pressure: 135/76 (05/16/22 0809)  Pulse: 103 (05/16/22 0809)  Temperature: 98 1 °F (36 7 °C) (05/16/22 0809)  Temp Source: Tympanic (05/16/22 0809)  Respirations: 18 (05/16/22 0809)  Height: 5' 6" (167 6 cm) (05/15/22 1536)  Weight - Scale: 121 kg (266 lb 12 1 oz) (05/16/22 0740)  SpO2: 97 % (05/15/22 2022)    Physical Exam  Vitals and nursing note reviewed  Constitutional:       General: She is not in acute distress  Appearance: She is obese  HENT:      Head: Normocephalic and atraumatic  Nose: Nose normal    Eyes:      General:         Right eye: No discharge  Left eye: No discharge  Cardiovascular:      Rate and Rhythm: Normal rate and regular rhythm  Heart sounds: Normal heart sounds  Pulmonary:      Effort: Pulmonary effort is normal  No respiratory distress  Breath sounds: Normal breath sounds  No wheezing, rhonchi or rales  Abdominal:      General: Bowel sounds are normal       Palpations: Abdomen is soft  Tenderness: There is no abdominal tenderness  There is no guarding  Musculoskeletal:      Right lower leg: Edema present  Left lower leg: Edema present  Comments: States current edema is improved compared to baseline    Skin:     General: Skin is warm and dry  Neurological:      Mental Status: She is alert and oriented to person, place, and time  Psychiatric:         Mood and Affect: Mood normal          Behavior: Behavior normal          Additional Data:     Lab Results: I have personally reviewed pertinent reports        Results from last 7 days   Lab Units 05/13/22  1733   WBC Thousand/uL 10 56*   HEMOGLOBIN g/dL 12 4   HEMATOCRIT % 38 1   PLATELETS Thousands/uL 241   BANDS PCT % 1   LYMPHO PCT % 26   MONO PCT % 9   EOS PCT % 1     Results from last 7 days   Lab Units 05/13/22  1293 SODIUM mmol/L 134*   POTASSIUM mmol/L 4 5   CHLORIDE mmol/L 99*   CO2 mmol/L 29   BUN mg/dL 15   CREATININE mg/dL 0 52*   ANION GAP mmol/L 6   CALCIUM mg/dL 9 1   ALBUMIN g/dL 3 4*   TOTAL BILIRUBIN mg/dL 0 30   ALK PHOS U/L 70   ALT U/L 22   AST U/L 14   GLUCOSE RANDOM mg/dL 99             Lab Results   Component Value Date/Time    HGBA1C 4 8 12/31/2021 07:22 AM    HGBA1C 5 2 12/21/2019 08:10 AM    HGBA1C 4 8 10/19/2019 07:17 AM           EKG, Pathology, and Other Studies Reviewed on Admission:   · EKG (5/13): NSR     ** Please Note: This note has been constructed using a voice recognition system   **

## 2022-05-16 NOTE — CMS CERTIFICATION NOTE
Recertification: Based upon physical, mental and social evaluations, I certify that inpatient psychiatric services continue to be medically necessary for this patient for a duration of 7 midnights for the treatment of  Major depression with psychotic features [facility])   Available alternative community resources still do not meet the patient's mental health care needs  I further attest that an established written individualized plan of care has been updated and is outlined in the patient's medical records

## 2022-05-16 NOTE — NURSING NOTE
Pt reports fleeting CAH to harm self however pt states she is trying to ignore them as pt reports no plan/intent to act on voices  Pt reports being in the television room, watching TV with peers helps to distract hallucinations  Pt denies HI, VH  Pt remains social with peers and attends groups  Denies any questions at this time

## 2022-05-16 NOTE — CASE MANAGEMENT
Readmit score: 29 (RED)   Confirmed Address:        [place]: [address] [facility], [facility])  [address], [address] [facility], [address] ([facility], [address] for GPS)     [facility]   Resides in the home with:    [facility] - multiple unrelated individuals  Pt does have her own room with her own mini fridge & TV  Pt said that she thinks she would like to have a roommate, as she gets lonely  Pt Will Return Home at Discharge: Yes   Confirmed Phone Number: (residence) [phone] Fax: [phone]  Care Coordinator - Sanam ext  [phone] or Yayo ext  [phone]   Confirmed Email Address: None    Marital Status:         Children: Single, never      None   Family History:            Adopted mother -   Does have some aunts but minimally involved  Commitment Status:  Status Change:  201     Admitted from: [facility] on 2022 @ [phone]   Presenting C/O:       Pt reporting she started feeling suicidal   Pt proudly stating that she packed a bag of clothing this time, so she would need [facility] to bring her anything  Pt also reported she has a new boyfriend, but she doesnt think he loves her the way that she would like him to    CM inquired if he was hospitalized, as Pt will often admit herself if her boyfriend is in the hospital, & she reported that he signed himself in, after she went to the hospital - she went first     Past Inpatient Tx:    2022 to 2022: [facility]  [facility]  2/10/2022 to 2022: [facility]  2021 to 2022: [facility]  -: [facility]  -2021: [facility]  -: [facility]  10/9-15/2021: [facility]  2021: [facility]  2021: Chandrika  2021: [facility]  2021: Scott Vargas  : Chandrika  2021: Noah Romero  -: DANNY MARSHALL  Pt reported first hospitalization at age 16 y/o   Current outpatient:     Psychiatrist:    Maribel Nails - Dr Vi Coon  [phone]               F  [phone] Therapist: Anika Pal ext  [phone]   ACT/ICM/CPS/WRT/SC: N/A has residential care coordinator - Sanam   PCP:    Unsure   Hx:    Pt denied any current medical concerns  Pt reported last seizure was over 4 yrs ago   Medications:    Pt said that during her last admission she was started on Remeron & Haldol but hadn't taken them long enough to start working  Pharmacy:    [address]   Spirituality/Sabianist:    [address]   Education:    12th grade / special education   Work/Income:     Preferred time for appts: SSDI - unknown amount      Residence schedules   Legal:      Probation/[place] Ofc: Pt denied      N/A   Access to Firearms:    Pt denied   Transportation:    Residence provides transportation as needed   Strength:    Pt identified that she is kind hearted & a people person as her strength  Coping Skills:   Pt identified that she likes coloring & word searches & listening to music as coping skills  Goal:    Pt unable to identify a goal for this admission  Referrals Needed: None - Pt lives in a [facility] who provide for all her needs  She has outpatient at [facility]  Transport at Discharge:    [address] will transport home  IMM:  5/16/2022 Brian Text PRECIOUS: N/A   Emergency Contact:     Rajani Quintero RKFKRB(PZMX)[phone]      ROIs obtained:    [facility] - outpatient & residential      Insurance:     Medicare A+B   COB: [address]    Audit: 0       PAWSS: 0 BAT: 0 UDS: negative   Substance Abuse: Freq  Amount Last Use Notes:   Heroin           Amp/Meth           Alcohol           Cocaine           Cannabis           Benzodiazepine           Barbituartes           Other           Tobacco Pt denied any current smoking           Pt declined to wait & meet with the treatment team in the morning, & agreed to review & sign Treatment Plan now with CM

## 2022-05-16 NOTE — NURSING NOTE
Pt remains pleasant in conversation  Pt states having CAH to harm self however pt states "I just want to ignore them " pt approached staff to ask for prn medication for [facility] which she received  Pt states feeling safe and feels she is able to seek staff if thoughts/hallucinations worsen  Pt sat by nurses station for some time until hallucinations improved  Denies VH  Social with select peers  Pt states she received an upsetting phone call this morning when she called her home to speak with her boyfriend  Pt states the staff told her that her boyfriend was not there so pt believes he may have been admitted to the hospital  Pt states "whenever one of us goes to the hospital, the other does too  So he probably went in since I did " pt denies any questions at this time

## 2022-05-16 NOTE — TREATMENT PLAN
TREATMENT PLAN REVIEW - Dg [age] y o  1970 female MRN: [id number]    [address] [place] [address] Room / Bed: Dennis Fabry 252/[facility] 720-18 Encounter: [id number]          Admit Date/Time:  5/15/2022  3:28 PM    Treatment Team: Attending Provider: Alexandre Schmidt MD; Consulting Physician: Leighann Holloway MD; Patient Care Technician: Flora Ayers; Care Manager: Julio C Wheat, JOSÉ MIGUEL; Patient Care Assistant: Mike Freedman; Medications RN: Dian Wray, RN; Charge Nurse: Jay Rosenbaum, JOSÉ MIGUEL; : Shimon Brown; Security: [employer];  Patient Care Technician: Chele Law; Registered Nurse: Juanpablo Molina RN    Diagnosis: Principal Problem:    Major depression with psychotic features [facility])  Active Problems:    Primary hypertension    Hyperlipidemia    PTSD (post-traumatic stress disorder)    Borderline personality disorder ([facility] )    Medical clearance for psychiatric admission    Seizures ([facility] )    Hyponatremia      Patient Strengths/Assets: cooperative, motivation for treatment/growth, patient is on a voluntary commitment    Patient Barriers/Limitations: limited support system    Short Term Goals: decrease in depressive symptoms, decrease in anxiety symptoms, decrease in paranoid thoughts, decrease in psychotic symptoms, decrease in suicidal thoughts, decrease in self abusive behaviors, improvement in self care, sleep improvement, improvement in appetite    Long Term Goals: improvement in depression, improvement in anxiety, resolution of depressive symptoms, stabilization of mood, free of suicidal thoughts, no self abusive behavior, adequate self care, adequate sleep, adequate appetite    Progress Towards Goals: starting psychiatric medications as prescribed    Recommended Treatment: medication management, patient medication education, group therapy, milieu therapy, continued Behavioral Health psychiatric evaluation/assessment process    Treatment Frequency: daily medication monitoring, group and milieu therapy daily, monitoring through interdisciplinary rounds, monitoring through weekly patient care conferences    Expected Discharge Date:  5-7 days    Discharge Plan: referral for outpatient medication management with a psychiatrist, referral for outpatient psychotherapy    Treatment Plan Created/Updated By: Paulie Gonzalez MD

## 2022-05-16 NOTE — PROGRESS NOTES
Status: Pt is a 13 from [address] [place] [address], who presented with reports of feeling sad for a few days & she tried to cut her arm with a thumb tack  Pt reported SI to cut her throat or wrist or jump in front of a car  Pt also reported A/H telling her she is fat & ugly  Pt pleasant & cooperative upon arrival to the unit, very comfortable, know most staff by name due to multiple admissions to unit  Pt appeared to have slept overnight  Medication: to be reviewed / no PRNs  D/C: TBD / Pt's residence & outpatient providers advocate for short length of stay    This patient is a 30 day readmission and was most recently discharged on:   [facility], April 26 to May 9, 2022(days)  The previous discharge plan was:   Pt lives at [facility], [facility], & is seen by psychiatry & therapist at [facility] for outpatient  Pt has a care coordinator at [address] [place] [address] as well  [facility] Readmission Risk score is:   29(red)  The identified triggers/events leading up to this admission include:  Pt unable to identify any stressor/trigger  Initial Plans for this admission (and who will be involved in treatment and discharge planning include:  Pt will meet with the psychiatrist to review medication(s) & determine if changes or titration is needed  CM will work with Pt & their supports to identify services/treatment needed at discharge  Nursing will provide education/support on medication  Therapeutic services will facilitate groups & provide support education on coping skills  05/16/22 0750   Team Meeting   Meeting Type Daily Rounds   Team Members Present   Team Members Present Physician;Nurse; Other (Discipline and Name);    Physician Team Member Dr Maribel De La O / Yarely Hanna / Charlie Kaplan Team Member Ean Jay / Guille Selby Management Team Member Margarito Britt / Anna Bear   Other (Discipline and Name) Cuco(art therapy)   Patient/Family Present   Patient Present No   Patient's Family Present No

## 2022-05-16 NOTE — NURSING NOTE
Pt expressing need for prn because of "voices telling me to hurt myself"  Pt given prn zyprexa 10mg at 0959, pt fearful that she would act on impulses to harm self at that time, remained with staff for time afterwards to maintain safety, prn was effective in reducing hallucinations and controlling agitation
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
## 2022-05-16 NOTE — ASSESSMENT & PLAN NOTE
 Admission labs reviewed, acceptable   Vitals stable    UDS negative    COVID-19 negative   EKG 5/13 reveals NSR,    Medically stable for continued inpatient psychiatric treatment based on available results

## 2022-05-16 NOTE — PLAN OF CARE
Pt participated in 3/3 groups for the day    Problem: Ineffective Coping  Goal: Participates in unit activities  Description: Interventions:  - Provide therapeutic environment   - Provide required programming   - Redirect inappropriate behaviors   Outcome: Progressing

## 2022-05-16 NOTE — ASSESSMENT & PLAN NOTE
· History of hyponatremia previously maintained on 2L fluid restriction, continue   · Na 134 on admission, acceptable

## 2022-05-17 PROBLEM — Z00.8 MEDICAL CLEARANCE FOR PSYCHIATRIC ADMISSION: Status: RESOLVED | Noted: 2022-04-27 | Resolved: 2022-05-17

## 2022-05-17 PROCEDURE — 99232 SBSQ HOSP IP/OBS MODERATE 35: CPT | Performed by: PHYSICIAN ASSISTANT

## 2022-05-17 PROCEDURE — 87636 SARSCOV2 & INF A&B AMP PRB: CPT | Performed by: PHYSICIAN ASSISTANT

## 2022-05-17 RX ORDER — PRAZOSIN HYDROCHLORIDE 2 MG/1
2 CAPSULE ORAL
Qty: 30 CAPSULE | Refills: 0 | Status: ON HOLD | OUTPATIENT
Start: 2022-05-17 | End: 2022-06-24

## 2022-05-17 RX ORDER — DIVALPROEX SODIUM 500 MG/1
2500 TABLET, EXTENDED RELEASE ORAL
Qty: 150 TABLET | Refills: 0 | Status: ON HOLD | OUTPATIENT
Start: 2022-05-17 | End: 2022-06-24

## 2022-05-17 RX ORDER — AMOXICILLIN 250 MG
1 CAPSULE ORAL DAILY PRN
Status: DISCONTINUED | OUTPATIENT
Start: 2022-05-17 | End: 2022-05-18 | Stop reason: HOSPADM

## 2022-05-17 RX ORDER — ASPIRIN 81 MG/1
81 TABLET, CHEWABLE ORAL DAILY
Qty: 30 TABLET | Refills: 0 | Status: ON HOLD | OUTPATIENT
Start: 2022-05-17 | End: 2022-06-24

## 2022-05-17 RX ORDER — LISINOPRIL 20 MG/1
20 TABLET ORAL DAILY
Qty: 30 TABLET | Refills: 0 | Status: ON HOLD | OUTPATIENT
Start: 2022-05-17 | End: 2022-06-24

## 2022-05-17 RX ORDER — HALOPERIDOL 10 MG/1
10 TABLET ORAL 2 TIMES DAILY
Qty: 60 TABLET | Refills: 0 | Status: ON HOLD | OUTPATIENT
Start: 2022-05-17 | End: 2022-06-24

## 2022-05-17 RX ORDER — POLYETHYLENE GLYCOL 3350 17 G/17G
17 POWDER, FOR SOLUTION ORAL DAILY PRN
Status: DISCONTINUED | OUTPATIENT
Start: 2022-05-17 | End: 2022-05-17

## 2022-05-17 RX ORDER — OLANZAPINE 10 MG/1
10 TABLET ORAL EVERY 4 HOURS PRN
Status: DISCONTINUED | OUTPATIENT
Start: 2022-05-17 | End: 2022-05-18 | Stop reason: HOSPADM

## 2022-05-17 RX ORDER — MIRTAZAPINE 30 MG/1
30 TABLET, FILM COATED ORAL
Qty: 30 TABLET | Refills: 0 | Status: ON HOLD | OUTPATIENT
Start: 2022-05-17 | End: 2022-06-24

## 2022-05-17 RX ORDER — OLANZAPINE 10 MG/1
10 INJECTION, POWDER, LYOPHILIZED, FOR SOLUTION INTRAMUSCULAR EVERY 4 HOURS PRN
Status: DISCONTINUED | OUTPATIENT
Start: 2022-05-17 | End: 2022-05-18 | Stop reason: HOSPADM

## 2022-05-17 RX ORDER — POLYETHYLENE GLYCOL 3350 17 G/17G
17 POWDER, FOR SOLUTION ORAL DAILY PRN
Status: DISCONTINUED | OUTPATIENT
Start: 2022-05-17 | End: 2022-05-18 | Stop reason: HOSPADM

## 2022-05-17 RX ORDER — ATORVASTATIN CALCIUM 40 MG/1
40 TABLET, FILM COATED ORAL
Qty: 30 TABLET | Refills: 0 | Status: ON HOLD | OUTPATIENT
Start: 2022-05-17 | End: 2022-06-24

## 2022-05-17 RX ADMIN — DIVALPROEX SODIUM 2500 MG: 500 TABLET, EXTENDED RELEASE ORAL at 21:43

## 2022-05-17 RX ADMIN — LISINOPRIL 20 MG: 20 TABLET ORAL at 09:14

## 2022-05-17 RX ADMIN — MIRTAZAPINE 30 MG: 15 TABLET, FILM COATED ORAL at 21:43

## 2022-05-17 RX ADMIN — HALOPERIDOL 10 MG: 10 TABLET ORAL at 17:20

## 2022-05-17 RX ADMIN — ATORVASTATIN CALCIUM 40 MG: 40 TABLET, FILM COATED ORAL at 15:48

## 2022-05-17 RX ADMIN — HALOPERIDOL 10 MG: 10 TABLET ORAL at 09:15

## 2022-05-17 RX ADMIN — DOCUSATE SODIUM AND SENNOSIDES 1 TABLET: 8.6; 5 TABLET ORAL at 13:15

## 2022-05-17 RX ADMIN — ASPIRIN 81 MG: 81 TABLET, COATED ORAL at 09:14

## 2022-05-17 RX ADMIN — PRAZOSIN HYDROCHLORIDE 2 MG: 1 CAPSULE ORAL at 21:43

## 2022-05-17 NOTE — DISCHARGE SUMMARY
Discharge Summary - [facility] 47 y o  female MRN: [id number]  Unit/Bed#: [facility] 252-01 Encounter: [id number]     Admission Date: 5/15/2022         Discharge Date: 5/18/22    Attending Psychiatrist: Chandler Greene*    Reason for Admission/HPI:     Per initial H&P from Dr Giovana Hernandez on 5/16/22:    "Per ED provider on 5/13: "Patient is a 80-year-old female with a past medical history significant for psychiatric disorder, recent discharge from psychiatric facility who today presents with suicidal ideations  Chon Rashid reports that she wants to cut her throat or wrists and if that does not work, she wants to jump in front of a car   She states that she was doing better after her discharge, but in the last few days started to have these thoughts again   Denies any homicidal ideations, auditory visual hallucinations  "     This is 48 yo female with hx of MDD with psychosis/anxiety/borderline personality/PTSD, obesity and seizures admitted to inpatient unit on voluntary status for suicidal ideations with plans and cut her arm with thumb tack in the context of psychosocial stressors  Patient endorses depressed mood and command voices to hurt self  Denies any intent or plan currently  Patient reports feeling lonely and depressed since her boy friend was hospitalized "      Social History     Tobacco History     Smoking Status  Never Smoker    Smokeless Tobacco Use  Never Used    Tobacco Comment  Pt stated "smokes when stressed"          Alcohol History     Alcohol Use Status  Not Currently          Drug Use     Drug Use Status  Not Currently          Sexual Activity     Sexually Active  Yes Partners  Male          Activities of Daily Living    Not Asked               Additional Substance Use Detail     Questions Responses    Problems Due to Past Use of Alcohol? No    Problems Due to Past Use of Substances?  No    Substance Use Assessment Denies substance use within the past 12 months    Alcohol Use Frequency Denies use in past 12 months    Cannabis frequency Never used    Comment: Never used on 1/9/2021     Heroin Frequency Denies use in past 12 months    Cocaine frequency Never used    Comment: Never used on 1/9/2021     Crack Cocaine Frequency Denies use in past 12 months    Methamphetamine Frequency Denies use in past 12 months    Narcotic Frequency Denies use in past 12 months    Benzodiazepine Frequency Denies use in past 12 months    Amphetamine frequency Denies use in past 12 months    Barbituate Frequency Denies use use in past 12 months    Inhalant frequency Never used    Comment: Never used on 1/9/2021     Hallucinogen frequency Never used    Comment: Never used on 1/9/2021     Ecstasy frequency Never used    Comment: Never used on 1/9/2021     Other drug frequency Never used    Comment: Never used on 1/9/2021     Opiate frequency Denies use in past 12 months    Last reviewed by Viv Edward RN on 5/15/2022          Past Medical History:   Diagnosis Date    Anxiety     Bipolar 1 disorder ([facility] )     Bipolar affective disorder, depressed, severe ([facility] ) 10/10/2021    Borderline personality disorder ([place] [address] )     CAD (coronary artery disease)     Cognitive impairment     Depression     Dyslipidemia     GERD (gastroesophageal reflux disease)     Hypertension     Obesity     Psychiatric disorder     Psychiatric illness     PTSD (post-traumatic stress disorder)     Schizoaffective disorder ([facility] )     Seizure ([facility] )      Past Surgical History:   Procedure Laterality Date    APPENDECTOMY      PATIENT DENIES HAVING APPENDIX REMOVED    CHOLECYSTECTOMY      FOOT SURGERY Right        Medications: All current active medications have been reviewed  Medications prior to admission:    Prior to Admission Medications   Prescriptions Last Dose Informant Patient Reported?  Taking?   aspirin 81 mg chewable tablet   No No   Sig: Chew 1 tablet (81 mg total) daily   atorvastatin (LIPITOR) 40 mg tablet   No No Sig: Take 1 tablet (40 mg total) by mouth daily with dinner   cholecalciferol (VITAMIN D3) 1,000 units tablet   No No   Sig: Take 1 tablet (1,000 Units total) by mouth daily   divalproex sodium (DEPAKOTE ER) 500 mg 24 hr tablet   No No   Sig: Take 5 tablets (2,500 mg total) by mouth daily at bedtime   haloperidol (HALDOL) 10 mg tablet   No No   Sig: Take 1 tablet (10 mg total) by mouth 2 (two) times a day Take 1 tablet in the morning and 1 tablet before bedtime   ibuprofen (MOTRIN) 600 mg tablet   No No   Sig: Take 1 tablet (600 mg total) by mouth every 6 (six) hours as needed for moderate pain   levOCARNitine (CARNITOR) 1 g/10 mL solution   No No   Sig: Take 3 3 mL (330 mg total) by mouth in the morning   lisinopril (ZESTRIL) 20 mg tablet   No No   Sig: Take 1 tablet (20 mg total) by mouth daily   melatonin 3 mg   No No   Sig: Take 1 tablet (3 mg total) by mouth daily at bedtime as needed (insomnia)   mirtazapine (REMERON) 30 mg tablet   No No   Sig: Take 1 tablet (30 mg total) by mouth daily at bedtime   prazosin (MINIPRESS) 2 mg capsule   No No   Sig: Take 1 capsule (2 mg total) by mouth daily at bedtime   senna-docusate sodium (SENOKOT S) 8 6-50 mg per tablet   No No   Sig: Take 1 tablet by mouth 2 (two) times a day      Facility-Administered Medications: None       Allergies: Allergies   Allergen Reactions    Fish Oil - Food Allergy Other (See Comments)     unknown    Fish-Derived Products - Food Allergy Hives       Objective     Vital signs in last 24 hours:    Temp:  [97 1 °F (36 2 °C)-97 5 °F (36 4 °C)] 97 1 °F (36 2 °C)  HR:  [88-99] 88  Resp:  [16-18] 16  BP: (115-133)/(57-81) 133/73    No intake or output data in the 24 hours ending 05/18/22 [facility]  Course:     Leydi was admitted to the inpatient psychiatric unit and started on Behavioral Health checks every 7 minutes   During the hospitalization she was encouraged to attend individual therapy, group therapy, milieu therapy and occupational therapy  Psychiatric medications were continued the hospital stay  To address depressive symptoms, self-abusive behavior and auditory hallucinations, Leydi was treated with antidepressant Remeron, mood stabilizer Depakote ER, antipsychotic medication Haldol and medication to help with nightmares and flashbacks Prazosin  Medication doses were continued during the hospital course  Remeron was continued at 30mg qhs  Depakote ER was continued at 2,500mg qhs  Unfortunately, Leydi is a rather difficult stick and we were unable to obtain a sufficient sample to check her serum VPA level  Her mood appeared stable, and it is suspected that her current dose is indeed therapeutic, but she was given a lab slip for repeat VPA level as an outpatient  Haldol was continued at 10mg BID  Minipress was also continued at 2mg qhs  Prior to beginning of treatment medications risks and benefits and possible side effects including risk of liver impairment related to treatment with Depakote, risk of parkinsonian symptoms, Tardive Dyskinesia and metabolic syndrome related to treatment with antipsychotic medications and risk of suicidality and serotonin syndrome related to treatment with antidepressants were reviewed with Leydi  She verbalized understanding and agreement for treatment  Upon admission Leydi was seen by medical service for medical clearance for inpatient treatment and medical follow up  Leydi's symptoms slowly improved over the hospital course  Initially after admission she was still feeling depressed and frustrated  With adjustment of medications and therapeutic milieu her symptoms gradually resolved  At the end of treatment Leydi was doing well  Her mood was improved at the time of discharge  Leydi denied suicidal ideation, intent or plan at the time of discharge and denied homicidal ideation, intent or plan at the time of discharge  There was no overt psychosis at the time of discharge   Auditory hallucinations were significantly improved and not command in nature  Leydi was participating appropriately in milieu at the time of discharge  Behavior was appropriate on the unit at the time of discharge  Sleep and appetite were improved  Leydi was tolerating medications and was not reporting any significant side effects at the time of discharge  We felt that Leydi achieved the maximum benefit of inpatient stay at that point, was at baseline at the end of the hospitalization and could now follow up with outpatient treatment  Samia felt stable and ready for discharge at the end of the hospital stay      Mental Status at Time of Discharge:     Appearance:  age appropriate, casually dressed, adequate grooming   Behavior:  pleasant, cooperative, calm   Speech:  normal rate and volume   Mood:  improved   Affect:  brighter   Thought Process:  organized   Associations: intact associations   Thought Content:  no overt delusions   Perceptual Disturbances: no auditory hallucinations, no visual hallucinations, does not appear responding to internal stimuli   Risk Potential: Suicidal ideation - None at present, contracts for safety on the unit  Homicidal ideation - None at present  Potential for aggression - No   Sensorium:  oriented to person, place and time/date   Memory:  recent and remote memory grossly intact   Consciousness:  alert and awake   Attention/Concentration: attention span and concentration appear shorter than expected for age   Insight:  improved   Judgment: improved   Gait/Station: normal gait/station   Motor Activity: no abnormal movements       Admission Diagnosis:    Principal Problem:    Major depression with psychotic features ([facility] )  Active Problems:    Primary hypertension    Hyperlipidemia    PTSD (post-traumatic stress disorder)    Borderline personality disorder (HCC)    Seizures ([facility] )    Hyponatremia      Discharge Diagnosis:     Principal Problem:    Major depression with psychotic features ([facility] )  Active Problems:    Primary hypertension    Hyperlipidemia    PTSD (post-traumatic stress disorder)    Borderline personality disorder ([address] )    Seizures (HCC)    Hyponatremia  Resolved Problems:    Medical clearance for psychiatric admission      Lab Results:   I have personally reviewed all pertinent laboratory/tests results  Most Recent Labs:   Lab Results   Component Value Date    WBC 10 56 (H) 05/13/2022    RBC 3 91 05/13/2022    HGB 12 4 05/13/2022    HCT 38 1 05/13/2022     05/13/2022    RDW 14 1 05/13/2022    NEUTROABS 7 02 04/25/2022    SODIUM 134 (L) 05/13/2022    K 4 5 05/13/2022    CL 99 (L) 05/13/2022    CO2 29 05/13/2022    BUN 15 05/13/2022    CREATININE 0 52 (L) 05/13/2022    GLUC 99 05/13/2022    GLUF 91 05/05/2022    CALCIUM 9 1 05/13/2022    AST 14 05/13/2022    ALT 22 05/13/2022    ALKPHOS 70 05/13/2022    TP 7 2 05/13/2022    ALB 3 4 (L) 05/13/2022    TBILI 0 30 05/13/2022    CHOLESTEROL 169 04/28/2022    HDL 45 (L) 04/28/2022    TRIG 180 (H) 04/28/2022    LDLCALC 88 04/28/2022    NONHDLC 124 04/28/2022    VALPROICTOT 72 4 05/08/2022    [id number] 3 556 05/13/2022    PREGUR negative 04/10/2022    PREGSERUM Negative 04/28/2022    RPR Non-Reactive 02/13/2022    HGBA1C 4 8 12/31/2021    EAG 91 12/31/2021       Discharge Medications:    See after visit summary for all reconciled discharge medications provided to patient and family  Discharge instructions/Information to patient and family:     See after visit summary for information provided to patient and family  Provisions for Follow-Up Care:    See after visit summary for information related to follow-up care and any pertinent home health orders  Discharge Statement:    I spent 30 minutes discharging the patient  This time was spent on the day of discharge  I had direct contact with the patient on the day of discharge       Additional documentation is required if more than 30 minutes were spent on discharge:    I reviewed with Leydi okeefe of compliance with medications and outpatient treatment after discharge  I discussed the medication regimen and possible side effects of the medications with Leydi prior to discharge  At the time of discharge she was tolerating psychiatric medications  I discussed outpatient follow up with Leydi  I reviewed with Leydi crisis plan and safety plan upon discharge  Leydi was competent to understand risks and benefits of withholding information and risks and benefits of her actions      Discharge on Two Antipsychotic Medications: Aisha Greene PA-C 05/18/22

## 2022-05-17 NOTE — CASE MANAGEMENT
CM contacted Pt's care coordinator at [facility], [place] , [place] [phone] ext [phone] & left a message to notify her of planned discharge for tomorrow  CM contacted the other care coordinator, Sandra Bird at ext  [phone]    CM contacted [address] [facility] @ [phone] & left a message seeking to schedule Pt a follow-up appointment with Dr Pedrito Guy CM called back for Pt's therapist, Ly Henderson, at [id number] & she reported she had been of last week, but upon her return, she was notified of Pt's admission  She reported that there for a while, Pt was doing well with her rewards system but then seemed to lose interest & Ly Henderson said that they were trying to wean off from a big ticket reward each week; Pt stayed out of the hospital for 50 days  CM asked about staff limiting Pt's access to thumbtacks at the residence & Ly Henderson reported she had made this request as well & will bring it up with her supervisor again  CM reviewed d/c plans for tomorrow & Ly Henderson said that she will have contact with Pt later in the day to do her risk assessment  Ly Henderson said that Pt's new boyfriend is going in & out of the hospital as well, & they have talked about moving one of them to a different facility

## 2022-05-17 NOTE — TREATMENT TEAM
05/17/22 1330   Activity/Group Checklist   Group Other (Comment)  (art therapy)   Attendance Attended   Attendance Duration (min) 31-45   Interactions Interacted appropriately   Affect/Mood Appropriate   Goals Achieved Able to engage in interactions; Able to listen to others     Goals for group include promotes authentic, spontaneous self- expression, connection, and insight  Patient utilized the provided materials to engage in the given directive  She spent most of her creative process focused but engaging in conversations with surrounding peers and staff, to which she was appropriate throughout  She left the session early and did not return

## 2022-05-17 NOTE — PROGRESS NOTES
Treatment Plan Meeting:  Diagnosis of Major Depression with psychotic features, PTSD, & Borderline personality disorder reviewed  Discussed short term goals for decrease in depressive symptoms, decrease in anxiety symptoms, decrease in paranoid thoughts, decrease in psychotic symptoms, decrease in suicidal thoughts, decrease in self abusive behaviors, improvement in self care, sleep improvement, improvement in appetite  Pt lives in a personal care boarding home & receives outpatient through [facility]  At this time, d/c is tentative for tomorrow  All parties in agreement & treatment plan was signed       05/17/22 0820   Team Meeting   Meeting Type Tx Team Meeting   Initial Conference Date 05/17/22   Next Conference Date 06/12/22   Team Members Present   Team Members Present Physician;Nurse;   Physician Team Member Dr Amy Bell Team Member [address] Management Team Member [place]   Patient/Family Present   Patient Present No  (Pt declined to meet with Treatment Team; she read & signed Treatment Plan during intake with CM)   Patient's Family Present No

## 2022-05-17 NOTE — PROGRESS NOTES
Progress Note - Behavioral Health     Leydi Khan 47 y o  female MRN: [id number]   Unit/Bed#: [facility] 252-01 Encounter: [id number]    Behavior over the last 24 hours: improving  Leydi is a 71-year-old female with a history of MDD with psychotic features who presents for psychiatric follow-up  Staff reports no issues overnight  She is pleasant, calm and cooperative upon approach  Affect is brighter and she reports her mood is improved, feeling less depressed and more motivated to be social with her peers  She admits some psychosocial stressors of roommate issues at her group home, [address], and she states that this was a significant factor in this current hospitalization  She denies any active thoughts to self-harm and no command AH thus far today  Medication and meal compliant  Notes a little bit of constipation and is requesting Senokot S as a p r n  Otherwise no acute concerns      Sleep: normal  Appetite: normal  Medication side effects: Yes - some constipation   ROS: all other systems are negative    Mental Status Evaluation:    Appearance:  age appropriate, casually dressed, adequate grooming, overweight   Behavior:  pleasant, cooperative, calm   Speech:  normal rate and volume   Mood:  less depressed   Affect:  brighter   Thought Process:  organized, goal directed, linear   Associations: intact associations   Thought Content:  no overt delusions   Perceptual Disturbances: no auditory hallucinations, no visual hallucinations, does not appear responding to internal stimuli   Risk Potential: Suicidal ideation - None at present, status post suicidal gesture, contracts for safety on the unit, would talk to staff if not feeling safe on the unit  Homicidal ideation - None at present  Potential for aggression - No   Sensorium:  oriented to person, place and time/date   Memory:  recent and remote memory grossly intact   Consciousness:  alert and awake   Attention/Concentration: attention span and concentration appear shorter than expected for age   Insight:  fair   Judgment: fair   Gait/Station: normal gait/station   Motor Activity: no abnormal movements     Vital signs in last 24 hours:    Temp:  [98 4 °F (36 9 °C)-98 5 °F (36 9 °C)] 98 5 °F (36 9 °C)  HR:  [] 108  Resp:  [17-18] 17  BP: (114-138)/(62-77) 138/64    Laboratory results: I have personally reviewed all pertinent laboratory/tests results    Most Recent Labs:   Lab Results   Component Value Date    WBC 10 56 (H) 05/13/2022    RBC 3 91 05/13/2022    HGB 12 4 05/13/2022    HCT 38 1 05/13/2022     05/13/2022    RDW 14 1 05/13/2022    NEUTROABS 7 02 04/25/2022    SODIUM 134 (L) 05/13/2022    K 4 5 05/13/2022    CL 99 (L) 05/13/2022    CO2 29 05/13/2022    BUN 15 05/13/2022    CREATININE 0 52 (L) 05/13/2022    GLUC 99 05/13/2022    CALCIUM 9 1 05/13/2022    AST 14 05/13/2022    ALT 22 05/13/2022    ALKPHOS 70 05/13/2022    TP 7 2 05/13/2022    ALB 3 4 (L) 05/13/2022    TBILI 0 30 05/13/2022    CHOLESTEROL 169 04/28/2022    HDL 45 (L) 04/28/2022    TRIG 180 (H) 04/28/2022    LDLCALC 88 04/28/2022    [place] 124 04/28/2022    VALPROICTOT 72 4 05/08/2022    [id number] 3 556 05/13/2022    PREGUR negative 04/10/2022    PREGSERUM Negative 04/28/2022    RPR Non-Reactive 02/13/2022    HGBA1C 4 8 12/31/2021    EAG 91 12/31/2021       Progress Toward Goals: progressing, working on coping skills, discharge planning    Assessment/Plan   Principal Problem:    Major depression with psychotic features ([facility] )  Active Problems:    Primary hypertension    Hyperlipidemia    PTSD (post-traumatic stress disorder)    Borderline personality disorder ([facility] )    Medical clearance for psychiatric admission    Seizures ([address] )    Hyponatremia      Recommended Treatment:     Planned medication and treatment changes:     All current active medications have been reviewed  Encourage group therapy, milieu therapy and occupational therapy  Behavioral Health checks every 7 minutes    Anticipating d/c tomorrow back to [facility]  Will check trough VPA level tonight and order COVID screening  Continue current medications      Current Facility-Administered Medications   Medication Dose Route Frequency Provider Last Rate    acetaminophen  650 mg Oral Q6H PRN uD Flavors, MD      acetaminophen  650 mg Oral Q4H PRN Du Marroquin, MD      acetaminophen  975 mg Oral Q6H PRN Du Marroquin MD      aspirin  81 mg Oral Daily [address] [place] [address]      atorvastatin  40 mg Oral Daily With Jed Wiseman PA-C      benztropine  1 mg Oral Q4H PRN Max 6/day Du Marroquin MD      hydrOXYzine HCL  50 mg Oral Q6H PRN Max 4/day Du Marroquin MD      Or    diphenhydrAMINE  50 mg Intramuscular Q6H PRN Du Marroquin MD      divalproex sodium  2,500 mg Oral  [address], [facility]      haloperidol  10 mg Oral BID Arminda Valdez MD      hydrOXYzine HCL  100 mg Oral Q6H PRN Max 4/day Du Marroquin MD      Or    LORazepam  2 mg Intramuscular Q6H PRN Du Marroquin MD      hydrOXYzine HCL  25 mg Oral Q6H PRN Max 4/day Du Marroquin MD      lisinopril  20 mg Oral Daily [address] [place] [address]      mirtazapine  30 mg Oral HS Arminda Valdez MD      OLANZapine  10 mg Oral Q4H PRN Jesus Guaman PA-C      Or    OLANZapine  10 mg Intramuscular Q4H PRN Jesus Guaman PA-C      OLANZapine  5 mg Oral Q3H PRN Max 6/day Du Marroquin MD      Or    OLANZapine  5 mg Intramuscular Q3H PRN Max 6/day Du Marroquin MD      OLANZapine  2 5 mg Oral Q4H PRN Max 6/day Du Marroquin MD      polyethylene glycol  17 g Oral Daily PRN Jesus Guaman PA-C      prazosin  2 mg Oral HS Arminda Valdez MD      propranolol  10 mg Oral Q8H PRN Du Marroquin MD      senna-docusate sodium  1 tablet Oral Daily PRN Jesus Guaman PA-C       Risks / Benefits of Treatment:    Risks, benefits, and possible side effects of medications explained to patient and patient verbalizes understanding and agreement for treatment  Counseling / Coordination of Care:    Patient's progress discussed with staff in treatment team meeting  Medications, treatment progress and treatment plan reviewed with patient      Chyna Baez PA-C 05/17/22

## 2022-05-17 NOTE — TREATMENT TEAM
05/17/22 1000   Activity/Group Checklist   Group Community meeting   Attendance Attended   Attendance Duration (min) 16-30   Interactions Interacted appropriately   Affect/Mood Appropriate   Goals Achieved Able to engage in interactions; Able to listen to others     Check ins and schedule review was completed at the beginning of the session  Patient fully participated in community meeting  She worked on her goal card and participated in the ice breaker activity  She shared her goals during group discussion  Goal card was collected, to be distributed to wrap up group facilitator, to be reviewed at the end of the day

## 2022-05-17 NOTE — PROGRESS NOTES
Status: Pt reported CAH to harm herself during the day & sat at the nurses station for a period of time, but then contracted for safety & went to group  Pt upset because she couldn't reach her boyfriend at the house & said that she thinks he is in the hospital too  Pt was visible & social in the evening, reported fleeting CAH but no plan or intent to harm herself  Pt slept overnight  Pt's heart rate is elevated this morning at 108  Medication: no changes / PRN - Zyprexa  D/C: Weds / Pt will return to her [facility] & outpatient providers  Pt will need a COVID test & prescriptions sent to [facility] today in preparation for d/c tomorrow     05/17/22 0750   Team Meeting   Meeting Type Daily Rounds   Team Members Present   Team Members Present Physician;Nurse; Other (Discipline and Name);    Physician Team Member Dr Carli Nevarez / Dinesh Cannon / Jim Millan Team Member [facility] Management Team Member Ade Bonilla / Popeye Martin   Other (Discipline and Name) Caterina Cuenca (art therapy)   Patient/Family Present   Patient Present No   Patient's Family Present No

## 2022-05-17 NOTE — NURSING NOTE
Pt is calm and cooperative  Visible on unit and attending groups throughout the morning  Reports sleeping ok overnight  States feeling "not too good" this morning and having AH to harm self  Denies plan or intent and reports feeling safe on unit

## 2022-05-17 NOTE — BH TRANSITION RECORD
Contact Information: If you have any questions, concerns, pended studies, tests and/or procedures, or emergencies regarding your inpatient behavioral health visit  Please contact [address] [place] [address] behavioral health unit [phone] and ask to speak to a , nurse or physician  A contact is available 24 hours/ 7 days a week at this number  Summary of Procedures Performed During your Stay:  Below is a list of major procedures performed during your hospital stay and a summary of results:  - Cardiac Procedures/Studies: ekg  Pending Studies (From admission, onward)     Start     Ordered    05/23/22 1040  Basic metabolic panel  Once         05/17/22 0819    05/17/22 2100  Valproic acid level, total  Once        Comments: Trough VPA level  Leydi is a tough stick  If we cannot obtain a sample tonight then we will send her home tomorrow with a lab order  Thank you      05/17/22 123              If studies are pending at discharge, follow up with your PCP and/or referring provider

## 2022-05-17 NOTE — DISCHARGE INSTR - APPOINTMENTS
Guru Beckford or Lilliana, our Eliza and Reva, will be calling you after your discharge, on the phone number that you provided  They will be available as an additional support, if needed  If you wish to speak with one of them, you may contact Karen Rodriguez at [phone] or Samuel Salgado at [phone]  You are being discharged back to your personal care boarding home, [facility], located at [address], [address] [place] [address]  The best number to reach you is through the residence at [phone]

## 2022-05-17 NOTE — DISCHARGE INSTR - OTHER ORDERS
Janice Rashid is a confidential 7 days/week telephone support service manned by trained mental health consumers  [employer] operates daily but is not able to accept calls between 2AM-6AM    [facility] provides support, a listening ear and can provide information about available services  [facility] specializes in the concerns of mental health consumers, their families and friends  However, we are also here for anyone who has a mental health concern, is confused about or just doesn't know anything about mental health or where to get information  To reach Janice Rashid, call [phone] accepts calls between 6:00 AM to 10:00 AM and from 4:00 PM to 12:00 AM      Text CONNECT to 594337 from anywhere in the [place], anytime, about any type of crisis  A live, trained Crisis Counselor receives the text and lets you know that they are here to listen  The volunteer Crisis Counselor will help you move from a hot moment to a cool moment  [facility] ([facility]) AT [place] - licensed telephone and mobile crisis services that provide mental health assessments to all age groups regardless of income or insurance  Crisis Intervention operates 24-hour/7 days a week  [address] [place] [address] assists consumer who are experiencing a mental health emergency and lack the resources to assist themselves  Immediate intervention for suicidal and depressed individuals with home visits/outreach being top priority  Crisis can be contacted at [phone]  The [facility] Mental [facility]ness [facility]) offers various education & support groups for you & your family  For more information visit their website at http://www [employer]/     Dial 2-1-1 to get connected/get help  Free, confidential information & referral available 24/7: Aging Services, Child & Youth Services, Counseling, Education/Training, Food/Shelter/Clothing, Health Services, Parenting, Substance Abuse, Support Groups, Volunteer Opportunities, & much more    Phone: 2-1-1 or [phone], Web: ZKO [email or web address] WT, Email: [email or web address]    [facility]  The [facility] ([place] [address]) offers persons with a mental illness a safe, healing environment to explore their personal and vocational potential and receive support in achieving their goals  Instead of traditional therapy, the work of the house is the rehabilitation  As members contribute meaningful work to the house, they build confidence and a sense of purpose  Be a Member - It's Free  Membership in the [facility] is open to any [facility] resident who is 25 or older and has a history of mental illness  Membership is free for life  [facility]  [place] [place] [address]  Hours: Monday - Friday: 8 a m  - 4 p m  With varying hours on holidays   ([place] [address] [place] [address] ([place], [place]) provides a stress-free atmosphere for persons 18 and older who have experienced mental health issues  What The [address] [employer]  Outings  Holiday gatherings  Recovery  Employment  Education  Community Resources  Empowerment  Helps participants achieve their goals  Enhances quality of life  Encourages leadership    The [address]  Hours: Monday through Friday: 4 p m  - 9 p m  Saturday: 2 p m  - 8 p m    Closed Sundays  Varying hours on holidays            Contact [phone]

## 2022-05-17 NOTE — PLAN OF CARE
Problem: SELF HARM/SUICIDALITY  Goal: Will have no self-injury during hospital stay  Description: INTERVENTIONS:  - Q 15 MINUTES: Routine safety checks  - Q WAKING SHIFT & PRN: Assess risk to determine if routine checks are adequate to maintain patient safety  - Encourage patient to participate actively in care by formulating a plan to combat response to suicidal ideation, identify supports and resources  Outcome: Progressing     Problem: SELF CARE DEFICIT  Goal: Return ADL status to a safe level of function  Description: INTERVENTIONS:  - Administer medication as ordered  - Assess ADL deficits and provide assistive devices as needed  - Obtain PT/OT consults as needed  - Assist and instruct patient to increase activity and self care as tolerated  Outcome: Progressing

## 2022-05-17 NOTE — TREATMENT TEAM
05/17/22 1230   Activity/Group Checklist   Group Life Skills  (Positive affirmations and how to use them)   Attendance Attended   Attendance Duration (min) 16-30   Interactions Interacted appropriately   Affect/Mood Appropriate   Goals Achieved Identified feelings; Able to listen to others; Able to engage in interactions     Goals for group include identifying aspects of life to utilize positive affirmations, how and when to use them and create positive affirmation cards  Pt fully participated in the life skills group  She followed along and was positive throughout  She shared her positive affirmations, and discussed which one of her chosen ones she felt like she needed the most in the moment

## 2022-05-18 VITALS
HEART RATE: 88 BPM | RESPIRATION RATE: 16 BRPM | DIASTOLIC BLOOD PRESSURE: 73 MMHG | HEIGHT: 66 IN | BODY MASS INDEX: 42.87 KG/M2 | OXYGEN SATURATION: 98 % | TEMPERATURE: 97.1 F | SYSTOLIC BLOOD PRESSURE: 133 MMHG | WEIGHT: 266.76 LBS

## 2022-05-18 LAB
FLUAV RNA RESP QL NAA+PROBE: NEGATIVE
FLUBV RNA RESP QL NAA+PROBE: NEGATIVE
SARS-COV-2 RNA RESP QL NAA+PROBE: NEGATIVE

## 2022-05-18 PROCEDURE — 99238 HOSP IP/OBS DSCHRG MGMT 30/<: CPT | Performed by: PHYSICIAN ASSISTANT

## 2022-05-18 RX ADMIN — HALOPERIDOL 10 MG: 10 TABLET ORAL at 09:27

## 2022-05-18 RX ADMIN — ASPIRIN 81 MG: 81 TABLET, COATED ORAL at 09:27

## 2022-05-18 RX ADMIN — LISINOPRIL 20 MG: 20 TABLET ORAL at 09:27

## 2022-05-18 NOTE — PLAN OF CARE
Problem: SELF HARM/SUICIDALITY  Goal: Will have no self-injury during hospital stay  Description: INTERVENTIONS:  - Q 15 MINUTES: Routine safety checks  - Q WAKING SHIFT & PRN: Assess risk to determine if routine checks are adequate to maintain patient safety  - Encourage patient to participate actively in care by formulating a plan to combat response to suicidal ideation, identify supports and resources  5/18/2022 0841 by Aroldo Corona RN  Outcome: Completed  5/18/2022 0841 by Aroldo Corona RN  Outcome: Adequate for Discharge  5/18/2022 0841 by Aroldo Corona RN  Outcome: Adequate for Discharge     Problem: DEPRESSION  Goal: Will be euthymic at discharge  Description: INTERVENTIONS:  - Administer medication as ordered  - Provide emotional support via 1:1 interaction with staff  - Encourage involvement in milieu/groups/activities  - Monitor for social isolation  5/18/2022 0841 by Aroldo Corona RN  Outcome: Completed  5/18/2022 0841 by Aroldo Corona RN  Outcome: Adequate for Discharge  5/18/2022 0841 by Aroldo Corona RN  Outcome: Adequate for Discharge     Problem: ANXIETY  Goal: Will report anxiety at manageable levels  Description: INTERVENTIONS:  - Administer medication as ordered  - Teach and encourage coping skills  - Provide emotional support  - Assess patient/family for anxiety and ability to cope  5/18/2022 0841 by Aroldo Corona RN  Outcome: Completed  5/18/2022 0841 by Aroldo Corona RN  Outcome: Adequate for Discharge  5/18/2022 0841 by Aroldo Corona RN  Outcome: Adequate for Discharge  Goal: By discharge: Patient will verbalize 2 strategies to deal with anxiety  Description: Interventions:  - Identify any obvious source/trigger to anxiety  - Staff will assist patient in applying identified coping technique/skills  - Encourage attendance of scheduled groups and activities  5/18/2022 0841 by Aroldo Corona RN  Outcome: Completed  5/18/2022 0841 by Aroldo Corona RN  Outcome: Adequate for Discharge  5/18/2022 0841 by Arnoldo Brownlee RN  Outcome: Adequate for Discharge     Problem: SELF CARE DEFICIT  Goal: Return ADL status to a safe level of function  Description: INTERVENTIONS:  - Administer medication as ordered  - Assess ADL deficits and provide assistive devices as needed  - Obtain PT/OT consults as needed  - Assist and instruct patient to increase activity and self care as tolerated  5/18/2022 0841 by Arnoldo Brownlee RN  Outcome: Completed  5/18/2022 0841 by Arnoldo Brownlee RN  Outcome: Adequate for Discharge  5/18/2022 0841 by Arnoldo Brownlee RN  Outcome: Adequate for Discharge     Problem: SAFETY ADULT  Goal: Patient will remain free of falls  Description: INTERVENTIONS:  - Educate patient/family on patient safety including physical limitations  - Instruct patient to call for assistance with activity   - Consult OT/PT to assist with strengthening/mobility   - Keep Call bell within reach  - Keep bed low and locked with side rails adjusted as appropriate  - Keep care items and personal belongings within reach  - Initiate and maintain comfort rounds  - Make Fall Risk Sign visible to staff  - Apply yellow socks and bracelet for high fall risk patients  - Consider moving patient to room near nurses station  5/18/2022 0841 by Arnoldo Brownlee RN  Outcome: Completed  5/18/2022 0841 by Arnoldo Brownlee RN  Outcome: Adequate for Discharge  5/18/2022 0841 by Arnoldo Brownlee RN  Outcome: Adequate for Discharge     Problem: DISCHARGE PLANNING  Goal: Discharge to home or other facility with appropriate resources  Description: INTERVENTIONS:  - Identify barriers to discharge w/patient and caregiver  - Arrange for needed discharge resources and transportation as appropriate  - Identify discharge learning needs (meds, wound care, etc )  - Arrange for interpretive services to assist at discharge as needed  - Refer to Case Management Department for coordinating discharge planning if the patient needs post-hospital services based on physician/advanced practitioner order or complex needs related to functional status, cognitive ability, or social support system  5/18/2022 0841 by Hilario Adhikari RN  Outcome: Completed  5/18/2022 0841 by Hilario Adhikari RN  Outcome: Adequate for Discharge  5/18/2022 0841 by Hilario Adhikari RN  Outcome: Adequate for Discharge     Problem: Ineffective Coping  Goal: Participates in unit activities  Description: Interventions:  - Provide therapeutic environment   - Provide required programming   - Redirect inappropriate behaviors   5/18/2022 0841 by Hilario Adhikari RN  Outcome: Completed  5/18/2022 0841 by Hilario Adhikari RN  Outcome: Adequate for Discharge

## 2022-05-18 NOTE — NURSING NOTE
late entry due to patient care for 2100: patient cooperative with attempt for lab draw  Unsuccessful in obtaining amount of blood needed for specimen  ( only able to get drops)  Notified charge RN of same

## 2022-05-18 NOTE — CASE MANAGEMENT
CM faxed current medication list & COVID test results to [facility] @ [phone]  CM faxed d/c letter to [facility] @ [phone], & to [facility] KISSKRISTINE ROSE received a phone call from Pt's care coordinator at [facility], [place], who reported staff was available to come pick-up Pt now  CM asked about the use of thumbtacks in the residence, & if they could be removed from areas that Pt has access to? Sanam said that they are only used in staff offices  She said that Pt snuck in her office & took a thumbtack once, while she was assisting with medications, but they are trying to make sure she doesn't have access  ROSE also asked about a on-call number for a staff member at [facility], that crisis could utilize when Pt presents to the ER; Sanam will follow-up on this  CM met with Pt who verbalized readiness for d/c  Pt said that her boyfriend, Talha Joyce is still in the hospital, but she doesn't know where  Pt had no questions about her d/c plans

## 2022-05-18 NOTE — CASE MANAGEMENT
Voicemail received from Filipe rivas [employer], who scheduled Pt for a psych eval with Dr Castillo Callrashard for 5/25 @ 1200 (phone)  CM met with Pt to review d/c plans for tomorrow & she was in agreement; IMM signed

## 2022-05-18 NOTE — NURSING NOTE
Pt is calm and cooperative this morning  Pleasant on interaction  Reports feeling tired and is observed napping after breakfast   Denies any thoughts of self harm this morning  Expresses readiness for discharge today

## 2022-05-18 NOTE — NURSING NOTE
AVS reviewed and prescriptions have been e-prescribed  Pt expresses understanding  Denies further questions or concerns  Pt discharged from unit via group home staff member

## 2022-05-18 NOTE — PLAN OF CARE
Problem: SELF HARM/SUICIDALITY  Goal: Will have no self-injury during hospital stay  Description: INTERVENTIONS:  - Q 15 MINUTES: Routine safety checks  - Q WAKING SHIFT & PRN: Assess risk to determine if routine checks are adequate to maintain patient safety  - Encourage patient to participate actively in care by formulating a plan to combat response to suicidal ideation, identify supports and resources  5/18/2022 0841 by Srini Preciado RN  Outcome: Adequate for Discharge  5/18/2022 0841 by Srini Preciado RN  Outcome: Adequate for Discharge     Problem: DEPRESSION  Goal: Will be euthymic at discharge  Description: INTERVENTIONS:  - Administer medication as ordered  - Provide emotional support via 1:1 interaction with staff  - Encourage involvement in milieu/groups/activities  - Monitor for social isolation  5/18/2022 0841 by Srini Preciado RN  Outcome: Adequate for Discharge  5/18/2022 0841 by Srini Preciado RN  Outcome: Adequate for Discharge     Problem: ANXIETY  Goal: Will report anxiety at manageable levels  Description: INTERVENTIONS:  - Administer medication as ordered  - Teach and encourage coping skills  - Provide emotional support  - Assess patient/family for anxiety and ability to cope  5/18/2022 0841 by Srini Preciado RN  Outcome: Adequate for Discharge  5/18/2022 0841 by Srini Preciado RN  Outcome: Adequate for Discharge  Goal: By discharge: Patient will verbalize 2 strategies to deal with anxiety  Description: Interventions:  - Identify any obvious source/trigger to anxiety  - Staff will assist patient in applying identified coping technique/skills  - Encourage attendance of scheduled groups and activities  5/18/2022 0841 by Srini Preciado RN  Outcome: Adequate for Discharge  5/18/2022 0841 by Srini Preciado RN  Outcome: Adequate for Discharge     Problem: SELF CARE DEFICIT  Goal: Return ADL status to a safe level of function  Description: INTERVENTIONS:  - Administer medication as ordered  - Assess ADL deficits and provide assistive devices as needed  - Obtain PT/OT consults as needed  - Assist and instruct patient to increase activity and self care as tolerated  5/18/2022 0841 by Aroldo Corona RN  Outcome: Adequate for Discharge  5/18/2022 0841 by Aroldo Corona RN  Outcome: Adequate for Discharge     Problem: SAFETY ADULT  Goal: Patient will remain free of falls  Description: INTERVENTIONS:  - Educate patient/family on patient safety including physical limitations  - Instruct patient to call for assistance with activity   - Consult OT/PT to assist with strengthening/mobility   - Keep Call bell within reach  - Keep bed low and locked with side rails adjusted as appropriate  - Keep care items and personal belongings within reach  - Initiate and maintain comfort rounds  - Make Fall Risk Sign visible to staff  - Apply yellow socks and bracelet for high fall risk patients  - Consider moving patient to room near nurses station  5/18/2022 0841 by Aroldo Corona RN  Outcome: Adequate for Discharge  5/18/2022 0841 by Aroldo Corona RN  Outcome: Adequate for Discharge     Problem: DISCHARGE PLANNING  Goal: Discharge to home or other facility with appropriate resources  Description: INTERVENTIONS:  - Identify barriers to discharge w/patient and caregiver  - Arrange for needed discharge resources and transportation as appropriate  - Identify discharge learning needs (meds, wound care, etc )  - Arrange for interpretive services to assist at discharge as needed  - Refer to Case Management Department for coordinating discharge planning if the patient needs post-hospital services based on physician/advanced practitioner order or complex needs related to functional status, cognitive ability, or social support system  5/18/2022 0841 by Aroldo Corona RN  Outcome: Adequate for Discharge  5/18/2022 0841 by Aroldo Corona RN  Outcome: Adequate for Discharge     Problem: Ineffective Coping  Goal: Participates in unit activities  Description: Interventions:  - Provide therapeutic environment   - Provide required programming   - Redirect inappropriate behaviors   Outcome: Adequate for Discharge

## 2022-06-04 NOTE — TREATMENT PLAN
Patient oriented to unit  Informed RN will meet with pt twice daily to educate on medications, diagnosis and assess symptoms  Nasal mucosa clear.  Mouth with normal mucosa. Throat is clear.

## 2022-06-22 ENCOUNTER — TELEPHONE (OUTPATIENT)
Dept: BEHAVIORAL HEALTH UNIT | Facility: HOSPITAL | Age: 52
End: 2022-06-22

## 2022-06-22 NOTE — TELEPHONE ENCOUNTER
Spoke with Sanam from [facility] at [phone] (call this number, press option to talk to staff immediately, then ask for Sanam)  Sanam baeza pt has been hinting she wants to go to hospital for the past week  Pt went to ED during/following her therapy session which Sanam states is a common time for her to want to go to ED  Sanam does not feel pt needs a medication change at this point in time, Sanam is open to communication from ED regarding pt's behavior and current status  Sanam baeza pt typically goes to [facility] for inpt [facility] stays but feels pt knows her boyfriend is either on the [facility] or [facility] unit and that is why she is requesting to go there  Pt's boyfriend is currently a patient on the [facility] unit

## 2022-06-23 ENCOUNTER — HOSPITAL ENCOUNTER (INPATIENT)
Facility: HOSPITAL | Age: 52
LOS: 5 days | Discharge: HOME | DRG: 885 | End: 2022-06-28
Attending: STUDENT IN AN ORGANIZED HEALTH CARE EDUCATION/TRAINING PROGRAM | Admitting: STUDENT IN AN ORGANIZED HEALTH CARE EDUCATION/TRAINING PROGRAM
Payer: MEDICARE

## 2022-06-23 DIAGNOSIS — K59.00 CONSTIPATION: ICD-10-CM

## 2022-06-23 DIAGNOSIS — I10 ESSENTIAL HYPERTENSION: ICD-10-CM

## 2022-06-23 DIAGNOSIS — Z00.8 MEDICAL CLEARANCE FOR PSYCHIATRIC ADMISSION: ICD-10-CM

## 2022-06-23 DIAGNOSIS — E78.5 HYPERLIPIDEMIA, UNSPECIFIED HYPERLIPIDEMIA TYPE: ICD-10-CM

## 2022-06-23 DIAGNOSIS — I10 PRIMARY HYPERTENSION: ICD-10-CM

## 2022-06-23 DIAGNOSIS — M79.605 LEFT LEG PAIN: ICD-10-CM

## 2022-06-23 DIAGNOSIS — F32.3 MAJOR DEPRESSION WITH PSYCHOTIC FEATURES (HCC): Primary | ICD-10-CM

## 2022-06-23 DIAGNOSIS — E78.5 HYPERLIPIDEMIA: ICD-10-CM

## 2022-06-23 PROCEDURE — 93005 ELECTROCARDIOGRAM TRACING: CPT

## 2022-06-23 RX ORDER — PRAZOSIN HYDROCHLORIDE 1 MG/1
2 CAPSULE ORAL
Status: DISCONTINUED | OUTPATIENT
Start: 2022-06-23 | End: 2022-06-28 | Stop reason: HOSPADM

## 2022-06-23 RX ORDER — ACETAMINOPHEN 325 MG/1
650 TABLET ORAL EVERY 6 HOURS PRN
Status: DISCONTINUED | OUTPATIENT
Start: 2022-06-23 | End: 2022-06-28 | Stop reason: HOSPADM

## 2022-06-23 RX ORDER — POLYETHYLENE GLYCOL 3350 17 G/17G
17 POWDER, FOR SOLUTION ORAL DAILY PRN
Status: DISCONTINUED | OUTPATIENT
Start: 2022-06-23 | End: 2022-06-28 | Stop reason: HOSPADM

## 2022-06-23 RX ORDER — ACETAMINOPHEN 325 MG/1
650 TABLET ORAL EVERY 4 HOURS PRN
Status: DISCONTINUED | OUTPATIENT
Start: 2022-06-23 | End: 2022-06-28 | Stop reason: HOSPADM

## 2022-06-23 RX ORDER — BENZTROPINE MESYLATE 1 MG/ML
1 INJECTION INTRAMUSCULAR; INTRAVENOUS
Status: DISCONTINUED | OUTPATIENT
Start: 2022-06-23 | End: 2022-06-28 | Stop reason: HOSPADM

## 2022-06-23 RX ORDER — LISINOPRIL 20 MG/1
20 TABLET ORAL DAILY
Status: DISCONTINUED | OUTPATIENT
Start: 2022-06-24 | End: 2022-06-28 | Stop reason: HOSPADM

## 2022-06-23 RX ORDER — HALOPERIDOL 10 MG/1
10 TABLET ORAL 2 TIMES DAILY
Status: DISCONTINUED | OUTPATIENT
Start: 2022-06-23 | End: 2022-06-26

## 2022-06-23 RX ORDER — HALOPERIDOL 5 MG/ML
5 INJECTION INTRAMUSCULAR
Status: DISCONTINUED | OUTPATIENT
Start: 2022-06-23 | End: 2022-06-28 | Stop reason: HOSPADM

## 2022-06-23 RX ORDER — BENZTROPINE MESYLATE 1 MG/1
1 TABLET ORAL
Status: DISCONTINUED | OUTPATIENT
Start: 2022-06-23 | End: 2022-06-28 | Stop reason: HOSPADM

## 2022-06-23 RX ORDER — HALOPERIDOL 5 MG/ML
2.5 INJECTION INTRAMUSCULAR
Status: DISCONTINUED | OUTPATIENT
Start: 2022-06-23 | End: 2022-06-28 | Stop reason: HOSPADM

## 2022-06-23 RX ORDER — LORAZEPAM 2 MG/ML
2 INJECTION INTRAMUSCULAR EVERY 6 HOURS PRN
Status: DISCONTINUED | OUTPATIENT
Start: 2022-06-23 | End: 2022-06-28 | Stop reason: HOSPADM

## 2022-06-23 RX ORDER — MINERAL OIL AND PETROLATUM 150; 830 MG/G; MG/G
1 OINTMENT OPHTHALMIC
Status: DISCONTINUED | OUTPATIENT
Start: 2022-06-23 | End: 2022-06-28 | Stop reason: HOSPADM

## 2022-06-23 RX ORDER — ASPIRIN 81 MG/1
81 TABLET, CHEWABLE ORAL DAILY
Status: DISCONTINUED | OUTPATIENT
Start: 2022-06-24 | End: 2022-06-28 | Stop reason: HOSPADM

## 2022-06-23 RX ORDER — MAGNESIUM HYDROXIDE/ALUMINUM HYDROXICE/SIMETHICONE 120; 1200; 1200 MG/30ML; MG/30ML; MG/30ML
30 SUSPENSION ORAL EVERY 4 HOURS PRN
Status: DISCONTINUED | OUTPATIENT
Start: 2022-06-23 | End: 2022-06-28 | Stop reason: HOSPADM

## 2022-06-23 RX ORDER — HYDROXYZINE HYDROCHLORIDE 25 MG/1
25 TABLET, FILM COATED ORAL
Status: DISCONTINUED | OUTPATIENT
Start: 2022-06-23 | End: 2022-06-28 | Stop reason: HOSPADM

## 2022-06-23 RX ORDER — ATORVASTATIN CALCIUM 40 MG/1
40 TABLET, FILM COATED ORAL
Status: DISCONTINUED | OUTPATIENT
Start: 2022-06-23 | End: 2022-06-28 | Stop reason: HOSPADM

## 2022-06-23 RX ORDER — BENZTROPINE MESYLATE 1 MG/ML
0.5 INJECTION INTRAMUSCULAR; INTRAVENOUS
Status: DISCONTINUED | OUTPATIENT
Start: 2022-06-23 | End: 2022-06-28 | Stop reason: HOSPADM

## 2022-06-23 RX ORDER — LORAZEPAM 2 MG/ML
1 INJECTION INTRAMUSCULAR
Status: DISCONTINUED | OUTPATIENT
Start: 2022-06-23 | End: 2022-06-28 | Stop reason: HOSPADM

## 2022-06-23 RX ORDER — MIRTAZAPINE 15 MG/1
30 TABLET, FILM COATED ORAL
Status: DISCONTINUED | OUTPATIENT
Start: 2022-06-23 | End: 2022-06-25

## 2022-06-23 RX ORDER — LORAZEPAM 2 MG/ML
2 INJECTION INTRAMUSCULAR
Status: DISCONTINUED | OUTPATIENT
Start: 2022-06-23 | End: 2022-06-28 | Stop reason: HOSPADM

## 2022-06-23 RX ORDER — HALOPERIDOL 5 MG/1
5 TABLET ORAL
Status: DISCONTINUED | OUTPATIENT
Start: 2022-06-23 | End: 2022-06-28 | Stop reason: HOSPADM

## 2022-06-23 RX ORDER — DIVALPROEX SODIUM 500 MG/1
2500 TABLET, EXTENDED RELEASE ORAL
Status: DISCONTINUED | OUTPATIENT
Start: 2022-06-23 | End: 2022-06-28 | Stop reason: HOSPADM

## 2022-06-23 RX ORDER — AMOXICILLIN 250 MG
1 CAPSULE ORAL DAILY PRN
Status: DISCONTINUED | OUTPATIENT
Start: 2022-06-23 | End: 2022-06-28 | Stop reason: HOSPADM

## 2022-06-23 RX ORDER — BISACODYL 10 MG
10 SUPPOSITORY, RECTAL RECTAL DAILY PRN
Status: DISCONTINUED | OUTPATIENT
Start: 2022-06-23 | End: 2022-06-28 | Stop reason: HOSPADM

## 2022-06-23 RX ORDER — HALOPERIDOL 2 MG/1
2 TABLET ORAL
Status: DISCONTINUED | OUTPATIENT
Start: 2022-06-23 | End: 2022-06-28 | Stop reason: HOSPADM

## 2022-06-23 RX ORDER — LANOLIN ALCOHOL/MO/W.PET/CERES
3 CREAM (GRAM) TOPICAL
Status: DISCONTINUED | OUTPATIENT
Start: 2022-06-23 | End: 2022-06-28 | Stop reason: HOSPADM

## 2022-06-23 RX ORDER — HYDROXYZINE 50 MG/1
100 TABLET, FILM COATED ORAL
Status: DISCONTINUED | OUTPATIENT
Start: 2022-06-23 | End: 2022-06-28 | Stop reason: HOSPADM

## 2022-06-23 RX ORDER — DIPHENHYDRAMINE HYDROCHLORIDE 50 MG/ML
50 INJECTION INTRAMUSCULAR; INTRAVENOUS EVERY 6 HOURS PRN
Status: DISCONTINUED | OUTPATIENT
Start: 2022-06-23 | End: 2022-06-28 | Stop reason: HOSPADM

## 2022-06-23 RX ORDER — HYDROXYZINE 50 MG/1
50 TABLET, FILM COATED ORAL
Status: DISCONTINUED | OUTPATIENT
Start: 2022-06-23 | End: 2022-06-28 | Stop reason: HOSPADM

## 2022-06-23 RX ORDER — ACETAMINOPHEN 325 MG/1
975 TABLET ORAL EVERY 6 HOURS PRN
Status: DISCONTINUED | OUTPATIENT
Start: 2022-06-23 | End: 2022-06-28 | Stop reason: HOSPADM

## 2022-06-23 RX ORDER — AMOXICILLIN 250 MG
1 CAPSULE ORAL 2 TIMES DAILY
Status: DISCONTINUED | OUTPATIENT
Start: 2022-06-23 | End: 2022-06-28 | Stop reason: HOSPADM

## 2022-06-23 RX ADMIN — DIVALPROEX SODIUM 2500 MG: 500 TABLET, EXTENDED RELEASE ORAL at 21:11

## 2022-06-23 RX ADMIN — HALOPERIDOL 10 MG: 10 TABLET ORAL at 17:09

## 2022-06-23 RX ADMIN — SENNOSIDES AND DOCUSATE SODIUM 1 TABLET: 50; 8.6 TABLET ORAL at 17:09

## 2022-06-23 RX ADMIN — MIRTAZAPINE 30 MG: 15 TABLET, FILM COATED ORAL at 21:12

## 2022-06-23 RX ADMIN — ATORVASTATIN CALCIUM 40 MG: 40 TABLET, FILM COATED ORAL at 17:09

## 2022-06-23 RX ADMIN — PRAZOSIN HYDROCHLORIDE 2 MG: 1 CAPSULE ORAL at 21:12

## 2022-06-23 NOTE — NURSING NOTE
Pt listed as high risk for c-ssrs  Pt room across from nurses station, in 254  Dr Jigar Earl of high risk as well as pt taking a pencil from unit with thoughts to self harm with it  Pt cooperative with returning pencil and sitting by nurses station until feeling safe  Dr Serina Epstein made aware and no further orders at this time

## 2022-06-23 NOTE — NURSING NOTE
Pt is a 201 arriving from [facility] with SI to cut self  Pt states she took a thumbtack from her group home and scratched her arm  Pt has superficial scratches to left forearm  Area was cleaned with soap and water  Pt also wrote on her arm stating she was suicidal  Pt states she is upset due to missing her boyfriend and feeling lonely  Pt denies HI  Reports having CAH stating to harm herself  Pt reports history of seizures with last seizure over 5 years ago, hx of gerd  Denies drug or alcohol use  Non-smoker  Pt cooperative with admission assessment  Pt was observed taking a pencil from unit stating she wanted to harm self  Pencil was taken from pt and pt cooperative with sitting by nurses station until feeling safe  Attending doctor notified

## 2022-06-24 PROBLEM — Z00.8 MEDICAL CLEARANCE FOR PSYCHIATRIC ADMISSION: Status: RESOLVED | Noted: 2021-01-09 | Resolved: 2022-06-24

## 2022-06-24 LAB
ATRIAL RATE: 82 BPM
P AXIS: 50 DEGREES
PR INTERVAL: 198 MS
QRS AXIS: 46 DEGREES
QRSD INTERVAL: 80 MS
QT INTERVAL: 366 MS
QTC INTERVAL: 427 MS
T WAVE AXIS: 59 DEGREES
VENTRICULAR RATE: 82 BPM

## 2022-06-24 PROCEDURE — 93010 ELECTROCARDIOGRAM REPORT: CPT | Performed by: INTERNAL MEDICINE

## 2022-06-24 PROCEDURE — 99222 1ST HOSP IP/OBS MODERATE 55: CPT | Performed by: STUDENT IN AN ORGANIZED HEALTH CARE EDUCATION/TRAINING PROGRAM

## 2022-06-24 PROCEDURE — 99222 1ST HOSP IP/OBS MODERATE 55: CPT | Performed by: PHYSICIAN ASSISTANT

## 2022-06-24 RX ADMIN — SENNOSIDES AND DOCUSATE SODIUM 1 TABLET: 50; 8.6 TABLET ORAL at 09:04

## 2022-06-24 RX ADMIN — MIRTAZAPINE 30 MG: 15 TABLET, FILM COATED ORAL at 21:18

## 2022-06-24 RX ADMIN — PRAZOSIN HYDROCHLORIDE 2 MG: 1 CAPSULE ORAL at 21:18

## 2022-06-24 RX ADMIN — ACETAMINOPHEN 975 MG: 325 TABLET, FILM COATED ORAL at 13:58

## 2022-06-24 RX ADMIN — ATORVASTATIN CALCIUM 40 MG: 40 TABLET, FILM COATED ORAL at 17:28

## 2022-06-24 RX ADMIN — LISINOPRIL 20 MG: 20 TABLET ORAL at 09:04

## 2022-06-24 RX ADMIN — ASPIRIN 81 MG CHEWABLE TABLET 81 MG: 81 TABLET CHEWABLE at 09:04

## 2022-06-24 RX ADMIN — DIVALPROEX SODIUM 2500 MG: 500 TABLET, EXTENDED RELEASE ORAL at 21:18

## 2022-06-24 RX ADMIN — SENNOSIDES AND DOCUSATE SODIUM 1 TABLET: 50; 8.6 TABLET ORAL at 17:29

## 2022-06-24 RX ADMIN — HALOPERIDOL 10 MG: 10 TABLET ORAL at 09:04

## 2022-06-24 RX ADMIN — HALOPERIDOL 10 MG: 10 TABLET ORAL at 17:28

## 2022-06-24 NOTE — BH TRANSITION RECORD
Contact Information: If you have any questions, concerns, pended studies, tests and/or procedures, or emergencies regarding your inpatient behavioral health visit  Please contact [facility] behavioral health unit [phone] and ask to speak to a , nurse or physician  A contact is available 24 hours/ 7 days a week at this number  Summary of Procedures Performed During your Stay:  Below is a list of major procedures performed during your hospital stay and a summary of results:  - No major procedures performed  Pending Studies (From admission, onward)     Start     Ordered    06/28/22 0600  Valproic acid level, total  Morning draw         06/24/22 1426    06/28/22 0600  CBC and differential  Morning draw         06/24/22 1426    06/28/22 0600  Comprehensive metabolic panel  Morning draw         06/24/22 1426    06/28/22 0600  Hemoglobin A1C  Morning draw         06/24/22 1426    06/28/22 0600  Lipid panel  Morning draw         06/24/22 1426    06/28/22 0600  RPR  Morning draw         06/24/22 1426    06/28/22 0600  TSH, 3rd generation with Free T4 reflex  Morning draw         06/24/22 1426              If studies are pending at discharge, follow up with your PCP and/or referring provider

## 2022-06-24 NOTE — CASE MANAGEMENT
06/24/22 1040   Team Meeting   Meeting Type Daily Rounds   Initial Conference Date 06/24/22   Team Members Present   Team Members Present Physician;Nurse;; Other (Discipline and Name)   Physician Team Member Dr Chente Colunga / Susanne East Team Member DANDRE [facility] Management Team Member Favian   Other (Discipline and Name) Cuco   Patient/Family Present   Patient Present No   Patient's Family Present No     Not a 30-day readmission  201 admission from [address] ED for SI with plan to cut self  Used thumbtack to make superficial cuts on L forearm  Stressors: missing boyfriend and loneliness  Lives [address]  Hx of seizures   Malodorous, showered, refused labs, slept

## 2022-06-24 NOTE — MALNUTRITION/BMI
This medical record reflects one or more clinical indicators suggestive of malnutrition and/or morbid obesity  BMI Findings:  Adult BMI Classifications: Morbid Obesity 40-44 9        Body mass index is 44 22 kg/m²  Treat with diet  See Nutrition note dated 6/24/22 for additional details  Completed nutrition assessment is viewable in the nutrition documentation

## 2022-06-24 NOTE — PLAN OF CARE
Pt participated in 3/4 groups for the day    Problem: Ineffective Coping  Goal: Participates in unit activities  Description: Interventions:  - Provide therapeutic environment   - Provide required programming   - Redirect inappropriate behaviors   Outcome: Progressing

## 2022-06-24 NOTE — DISCHARGE SUMMARY
Discharge Summary - [address] [place] [address] 47 y o  female MRN: [id number]  Unit/Bed#: -01 Encounter: [id number]     Admission Date: 6/23/2022         Discharge Date: 6/28/22    Attending Psychiatrist: Dr Ivett Mercedes    Reason for Admission/HPI:     Per initial H&P from Dr Chiqui Barroso on 6/24/22:    "Per ED provider on 6/21: "Patient is a 50-year-old female with a past medical history significant for psychiatric disorder who today presents for suicidal ideations  Nancy Zambrano states that she is hearing voices that are telling her to kill herself  Nancy Zambrano cut her left forearm 2 days ago in an attempt to kill herself  Nancy Zambrano has prior suicide attempts and prior inpatient psychiatric admissions   Denies any homicidal ideations/auditory/visual hallucinations  "     Per Crisis worker on 6/22: "Crisis met with patient to complete an assessment and safety risk assessment after reviewing the [facility] High Utilizer note  Patient reported SI and it was not until she was asked if she had a plan that she reported that she would cut herself with a plastic knife  She also reported that she is having command hallucinations to harm self  When asked what the voices are specifically telling her what to do she stated she could not elaborate further  Patient also did not appear to be responding to internal stimuli throughout the assessment  Patient does not appear to have a strong intent behind her suicidal statements  It appears that she is attempting to use IP treatment as an avoidant behavior and for secondary gain  Crisis asked patient what she hopes to gain from IP treatment as she has sought IP treatment multiple times over the last year and patient was unable to identify what she hoped to gain of seeking a high level of care  Patient also was unable to identify why she felt like returning back to her supportive group home would not be a supportive environment  Patient did not note a change in sleep or appetite   Crisis asked patient for consent to collaborate with staff at [facility] in order to obtain collateral information to make a recommendation for IP treatment or for her to return back to the group home  Patient was hesitant to provide verbal consent, however, after crisis explained the benefits of case collaboration she was agreeable for crisis to call and speak with her providers  Crisis called [facility] (Sanam [phone]) to obtain collateral information and their recommendation per patient's presentation  Crisis was informed that it appears that patient is seeking IP treatment for secondary gain to connect with a peer that she has a relationship within a  psych unit and it is avoidant behavior to process her current emotions theraputically  Staff also do not believe that she has intent with her suicidal thoughts and has limited access to means  It was reported that the group home is in a remote area which is not near a high traffic area and she does not have access to sharp objects  At times patient has attempted to obtain thumb tacs from staff offices, however, they can be removed/locked up from patient's reach  Staff is also willing to lock up plastic wear and put her on 15 minute checks should she be discharged for an extra safety precaution  Staff is not recommending IP treatment at this time for the patient and feel as if patient would be safe to return home should that be the recommendation  "        This is 46 yo female with hx of MDD with psychosis/anxiety/borderline personality/PTSD, obesity and seizures admitted to inpatient unit on voluntary status for command voices to hurt self,  suicidal ideations with plans and cut her arm with thumb tack in the context of psychosocial stressors  Patient reports feeling lonely and depressed since her boy friend was hospitalized  Patient endorses depressed mood and command voices to hurt self   Denies any intent or plan currently "      Social History     Tobacco History Smoking Status  Never Smoker    Smokeless Tobacco Use  Never Used    Tobacco Comment  Pt stated "smokes when stressed"          Alcohol History     Alcohol Use Status  Not Currently          Drug Use     Drug Use Status  Not Currently          Sexual Activity     Sexually Active  Yes Partners  Male          Activities of Daily Living    Not Asked               Additional Substance Use Detail     Questions Responses    Problems Due to Past Use of Alcohol? No    Problems Due to Past Use of Substances? No    Substance Use Assessment Denies substance use within the past 12 months    Alcohol Use Frequency Denies use in past 12 months    Cannabis frequency Never used    Comment: Never used on 1/9/2021     Heroin Frequency Denies use in past 12 months    Cocaine frequency Never used    Comment: Never used on 1/9/2021     Crack Cocaine Frequency Denies use in past 12 months    Methamphetamine Frequency Denies use in past 12 months    Narcotic Frequency Denies use in past 12 months    Benzodiazepine Frequency Denies use in past 12 months    Amphetamine frequency Denies use in past 12 months    Barbituate Frequency Denies use use in past 12 months    Inhalant frequency Never used    Comment: Never used on 1/9/2021     Hallucinogen frequency Never used    Comment: Never used on 1/9/2021     Ecstasy frequency Never used    Comment: Never used on 1/9/2021     Other drug frequency Never used    Comment: Never used on 1/9/2021     Opiate frequency Denies use in past 12 months    Not reviewed            Past Medical History:   Diagnosis Date    Anxiety     Bipolar 1 disorder ([facility] )     Bipolar affective disorder, depressed, severe ([facility] ) 10/10/2021    Borderline personality disorder ([facility] )     CAD (coronary artery disease)     Cognitive impairment     Depression     Dyslipidemia     GERD (gastroesophageal reflux disease)     Hypertension     Obesity     Psychiatric disorder     Psychiatric illness     PTSD (post-traumatic stress disorder)     [facility]oaffective disorder [facility] )     Seizure ([facility] )      Past Surgical History:   Procedure Laterality Date    APPENDECTOMY      PATIENT DENIES HAVING APPENDIX REMOVED    CHOLECYSTECTOMY      FOOT SURGERY Right        Medications: All current active medications have been reviewed  Medications prior to admission:    Prior to Admission Medications   Prescriptions Last Dose Informant Patient Reported? Taking?   aspirin 81 mg chewable tablet   No No   Sig: Chew 1 tablet (81 mg total)  in the morning  atorvastatin (LIPITOR) 40 mg tablet   No No   Sig: Take 1 tablet (40 mg total) by mouth daily with dinner   divalproex sodium (DEPAKOTE ER) 500 mg 24 hr tablet   No No   Sig: Take 5 tablets (2,500 mg total) by mouth daily at bedtime   haloperidol (HALDOL) 10 mg tablet   No No   Sig: Take 1 tablet (10 mg total) by mouth in the morning and 1 tablet (10 mg total) in the evening  Take 1 tablet in the morning and 1 tablet before bedtime  lisinopril (ZESTRIL) 20 mg tablet   No No   Sig: Take 1 tablet (20 mg total) by mouth in the morning  mirtazapine (REMERON) 30 mg tablet   No No   Sig: Take 1 tablet (30 mg total) by mouth daily at bedtime   prazosin (MINIPRESS) 2 mg capsule   No No   Sig: Take 1 capsule (2 mg total) by mouth daily at bedtime   senna-docusate sodium (SENOKOT S) 8 6-50 mg per tablet   No No   Sig: Take 1 tablet by mouth 2 (two) times a day      Facility-Administered Medications: None       Allergies:      Allergies   Allergen Reactions    Fish Oil - Food Allergy Other (See Comments)     unknown    Fish-Derived Products - Food Allergy Hives       Objective     Vital signs in last 24 hours:    Temp:  [99 °F (37 2 °C)-99 1 °F (37 3 °C)] 99 °F (37 2 °C)  HR:  [80] 80  Resp:  [16-18] 16  BP: (107-146)/(56-75) 146/68    No intake or output data in the 24 hours ending 06/28/22 1147    Hospital Course:     Leydi was admitted to the inpatient psychiatric unit and started on Behavioral Health checks every 7 minutes  During the hospitalization she was encouraged to attend individual therapy, group therapy, milieu therapy and occupational therapy  Leydi is a high utilizer of the behavioral health system  She has demonstrated a clear pattern of behavior involving severely diminished stress tolerance and extreme features of borderline personality disorder  She continues to bring herself to the ED stating that she is suicidal with plans to self-harm via cutting her arm with a thumbtack  A plan was in place to safely discharge Leydi back to her group home and avoid positive reinforcement of multiple repeated inpatient psychiatric admissions  Evidently, this plan could not be safely carried out between the ED, Leydi, and her behavioral health team, and as such, Leydi was subsequently admitted for her 10th inpatient behavioral health admission in the past calendar year  Psychiatric medications were continued the hospital stay  To address depressive symptoms and auditory hallucinations, Leydi was treated with antidepressant Remeron, mood stabilizer Depakote ER, antipsychotic medication Haldol and medication to help with nightmares and flashbacks Prazosin  Medication doses were continued during the hospital course  Remeron was adjusted to 45mg qhs for depression  Depakote ER was continued at 2,500mg qhs  On that dose of Depakote ER, the VPA level was 85 on admission and deemed clinically therapeutic  Haldol was continued at 10mg BID  Minipress was continued at 2mg qhs  Prior to beginning of treatment medications risks and benefits and possible side effects including risk of liver impairment related to treatment with Depakote, risk of parkinsonian symptoms, Tardive Dyskinesia and metabolic syndrome related to treatment with antipsychotic medications and risk of suicidality and serotonin syndrome related to treatment with antidepressants were reviewed with Leydi   She verbalized understanding and agreement for treatment  Upon admission Leydi was seen by medical service for medical clearance for inpatient treatment and medical follow up  Leydi's symptoms improved over the hospital course  Initially after admission she was still feeling depressed  With adjustment of medications and therapeutic milieu her symptoms improved  At the end of treatment Leydi was stable  Her mood was doing well at the time of discharge  Leydi denied suicidal ideation, intent or plan at the time of discharge and denied homicidal ideation, intent or plan at the time of discharge  There was no overt psychosis at the time of discharge  Leydi was participating appropriately in milieu at the time of discharge  Behavior was appropriate on the unit at the time of discharge  Sleep and appetite were improved  Leydi was tolerating medications and was not reporting any significant side effects at the time of discharge  We felt that at the end of the hospital stay Leydi was at baseline and was ready for discharge  Leydi continues to demonstrate a pattern of behavior most consistent with severe borderline personality disorder and decreased tolerance for stress  Repeated inpatient behavioral health admissions will prove to be detrimental for this patient in the long run as it reinforces negative behaviors  She would greatly benefit from dedicated dialectical behavioral therapy in the future      Mental Status at Time of Discharge:     Appearance:  age appropriate, casually dressed, adequate grooming, overweight   Behavior:  cooperative, calm   Speech:  slow   Mood:  less depressed   Affect:  constricted   Thought Process:  organized, goal directed, linear   Associations: concrete associations   Thought Content:  no overt delusions   Perceptual Disturbances: no auditory hallucinations, no visual hallucinations, does not appear responding to internal stimuli   Risk Potential: Suicidal ideation - None at present, contracts for safety on the unit, would talk to staff if not feeling safe on the unit  Homicidal ideation - None at present  Potential for aggression - No   Sensorium:  oriented to person, place and time/date   Memory:  recent and remote memory grossly intact   Consciousness:  alert and awake   Attention/Concentration: attention span and concentration appear shorter than expected for age   Insight:  limited   Judgment: limited   Gait/Station: normal gait/station   Motor Activity: no abnormal movements       Admission Diagnosis:    Principal Problem:    Major depression with psychotic features ([facility] )  Active Problems:    Primary hypertension    Hyperlipidemia    Class 3 severe obesity due to excess calories without serious comorbidity in [facility])    PTSD (post-traumatic stress disorder)    Borderline personality disorder ([facility] )    Seizures ([facility] )      Discharge Diagnosis:     Principal Problem:    Major depression with psychotic features ([facility] )  Active Problems:    Primary hypertension    Hyperlipidemia    Class 3 severe obesity due to excess calories without serious comorbidity in [facility])    PTSD (post-traumatic stress disorder)    Borderline personality disorder ([facility] )    Seizures ([facility] )  Resolved Problems:    Medical clearance for psychiatric admission      Lab Results:   I have personally reviewed all pertinent laboratory/tests results    Most Recent Labs:   Lab Results   Component Value Date    WBC 9 17 06/21/2022    RBC 3 63 (L) 06/21/2022    HGB 11 8 06/21/2022    HCT 35 4 06/21/2022     06/21/2022    RDW 14 3 06/21/2022    NEUTROABS 7 02 04/25/2022    SODIUM 136 06/21/2022    K 4 4 06/21/2022     06/21/2022    CO2 28 06/21/2022    BUN 16 06/21/2022    CREATININE 0 84 06/21/2022    GLUC 91 06/21/2022    GLUF 91 05/05/2022    CALCIUM 9 1 06/21/2022    AST 14 06/21/2022    ALT 21 06/21/2022    ALKPHOS 69 06/21/2022    TP 6 9 06/21/2022    ALB 3 3 (L) 06/21/2022    TBILI 0 40 06/21/2022    CHOLESTEROL 169 04/28/2022    HDL 45 (L) 04/28/2022    TRIG 180 (H) 04/28/2022    [address] 88 04/28/2022    [place] 124 04/28/2022    VALPROICTOT 85 06/21/2022    GBJ8PWUNSWAY 2 630 06/21/2022    PREGUR negative 04/10/2022    PREGSERUM Negative 04/28/2022    RPR Non-Reactive 02/13/2022    HGBA1C 4 8 12/31/2021    EAG 91 12/31/2021       Discharge Medications:    See after visit summary for all reconciled discharge medications provided to patient and family  Discharge instructions/Information to patient and family:     See after visit summary for information provided to patient and family  Provisions for Follow-Up Care:    See after visit summary for information related to follow-up care and any pertinent home health orders  Discharge Statement:    I spent 30 minutes discharging the patient  This time was spent on the day of discharge  I had direct contact with the patient on the day of discharge  Additional documentation is required if more than 30 minutes were spent on discharge:    I reviewed with Leydi importance of compliance with medications and outpatient treatment after discharge  I discussed the medication regimen and possible side effects of the medications with Leydi prior to discharge  At the time of discharge she was tolerating psychiatric medications  I discussed outpatient follow up with Leydi  I reviewed with Leydi crisis plan and safety plan upon discharge      Discharge on Two Antipsychotic Medications: No    Alethea Becerril PA-C 06/28/22

## 2022-06-24 NOTE — TREATMENT TEAM
06/24/22 1330   Activity/Group Checklist   Group Other (Comment)  (art therapy)   Attendance Attended   Attendance Duration (min) 31-45   Interactions Interacted appropriately   Affect/Mood Appropriate   Goals Achieved Able to engage in interactions; Able to listen to others     Goals for group include promotes authentic, spontaneous self- expression, connection, and insight  Patient utilized the provided materials to engage in the given directive  She spent most of her creative process focused but engaging in conversations with surrounding peers and staff, to which she was appropriate throughout  She left the session early and did not return before group was over

## 2022-06-24 NOTE — TREATMENT TEAM
06/24/22 1400   Activity/Group Checklist   Group Admission/Discharge   Attendance Attended   Attendance Duration (min) 0-15   Interactions Interacted appropriately   Affect/Mood Appropriate   Goals Achieved Able to engage in interactions     Pt filled out the admission self assessment form, with the assistance of this therapist  Once completed she had no further questions or concerns for this therapist at this time

## 2022-06-24 NOTE — ASSESSMENT & PLAN NOTE
· Blood pressure stable  · Continue lisinopril  · Patient reporting dizziness upon standing, check orthostatic BP

## 2022-06-24 NOTE — ASSESSMENT & PLAN NOTE
· Presented to the emergency department due to worsening auditory hallucinations, suicidal ideation  · Further plan per Psychiatry

## 2022-06-24 NOTE — TREATMENT TEAM
06/24/22 1230   Activity/Group Checklist   Group Life Skills   Attendance Attended   Attendance Duration (min) 16-30   Interactions Interacted appropriately   Affect/Mood Appropriate   Goals Achieved Able to engage in interactions; Able to listen to others; Identified feelings     Goals for group include identifying positive supports, thinking about personal boundaries, communication skills, patience, stress management and coping with anxiety  Pt fully participated in the life skills group  She followed along, was patient waiting for her turn, and answered questions positively and appropriately

## 2022-06-24 NOTE — TREATMENT PLAN
TREATMENT PLAN REVIEW - Dg [age] y o  1970 female MRN: [id number]    [address] [place] [address] Room / Bed: [facility] 254/[facility] Encounter: [id number]          Admit Date/Time:  6/23/2022  2:55 PM    Treatment Team: Attending Provider: Linda Brown MD; Patient Care Assistant: Richard Sigala; : Ishaan Syed; Patient Care Technician: Riaz Dubose; Care Manager: Dao Rea RN; Patient Care Technician: Margarita Whittaker; Patient Care Assistant: Meeta Bourne; Security: Everlina Muscevelyn; Registered Nurse: Jose Pina, RN; Registered Nurse: Windy Elizabeth RN; Physician Assistant: Orly Baez PA-C; Patient Care Assistant: Aren Corona;  Charge Nurse: Gt Dove RN; : Manju Walden    Diagnosis: Principal Problem:    Major depression with psychotic features [facility])  Active Problems:    Medical clearance for psychiatric admission    PTSD (post-traumatic stress disorder)    Borderline personality disorder ([facility] )    Seizures ([facility] )      Patient Strengths/Assets: cooperative, patient is on a voluntary commitment    Patient Barriers/Limitations: limited support system, resistance to treatment    Short Term Goals: decrease in depressive symptoms, decrease in anxiety symptoms, decrease in psychotic symptoms, decrease in suicidal thoughts, decrease in self abusive behaviors, improvement in self care, sleep improvement, improvement in appetite    Long Term Goals: improvement in depression, improvement in anxiety, resolution of depressive symptoms, stabilization of mood, free of suicidal thoughts, no self abusive behavior, adequate self care    Progress Towards Goals: starting psychiatric medications as prescribed    Recommended Treatment: medication management, patient medication education, group therapy, milieu therapy, continued Behavioral Health psychiatric evaluation/assessment process    Treatment Frequency: daily medication monitoring, group and milieu therapy daily, monitoring through interdisciplinary rounds, monitoring through weekly patient care conferences    Expected Discharge Date:  5-7 days    Discharge Plan: referral for outpatient medication management with a psychiatrist, referral for outpatient psychotherapy    Treatment Plan Created/Updated By: Polo Ryan MD

## 2022-06-24 NOTE — ASSESSMENT & PLAN NOTE
· Vital signs stable at time of assessment  · Patient declined morning blood draw    Reviewed blood work from 06/21  · EKG: NSR normal QTc HR 82  · Patient appears medically stable at this time for inpatient psychiatric treatment

## 2022-06-24 NOTE — NURSING NOTE
Leydi is out in the community and social with select peers on the unit  Patient is attending select groups, with appropriate participation when in attendance  Leydi denies current SI/HI/VH, endorses ongoing AH, which she reports, "   aren't too bad right now " After encouragement from staff, Leydi was able to perform ADLs, with minimal assistance from staff to help dry her feet  Leydi reports intermittent anxiety, "   just sometimes " Patient educated r/t importance of routine performance of ADLs, as well as appropriate coping skills to use when experiencing anxiety  Leydi was encouraged to seek staff with any issues and/or concerns, appears receptive at this time

## 2022-06-24 NOTE — DISCHARGE INSTR - APPOINTMENTS
Radha Weller or Lilliana, our Eliza and Reva, will be calling you after your discharge, on the phone number that you provided  They will be available as an additional support, if needed  If you wish to speak with one of them, you may contact Meagan Sepulveda at [phone] or Sal Bustos at [phone]  You are being discharged back to [place]  [employer], located at [facility], [address] [place] [address]  The best way to contact you is through your care coordinator, Sanam, at [phone]

## 2022-06-24 NOTE — CONSULTS
[address] [address] [address] 1970, 47 y o  female MRN: [id number]  Unit/Bed#: U 254-01 Encounter: [id number]  Primary Care Provider: Alejandro Allred MD   Date and time admitted to hospital: 6/23/2022  2:55 PM    Inpatient consult for Medical Clearance for [address] [place] [address] patient  Consult performed by: Cleo Cook PA-C  Consult ordered by: Naima Andrea PA-C        Medical clearance for psychiatric admission  Assessment & Plan  · Vital signs stable at time of assessment  · Patient declined morning blood draw  Reviewed blood work from 06/21  · EKG: NSR normal QTc HR 82  · Patient appears medically stable at this time for inpatient psychiatric treatment    Primary hypertension  Assessment & Plan  · Blood pressure stable  · Continue lisinopril  · Patient reporting dizziness upon standing, check orthostatic BP    Hyperlipidemia  Assessment & Plan  · Continue statin    Seizures ([address] )  Assessment & Plan  · Denies any recent seizure activity  · Continue Depakote    Class 3 severe obesity due to excess calories without serious comorbidity in adult [facility])  Assessment & Plan  · Lifestyle modifications    * Major depression with psychotic features [facility])  Assessment & Plan  · Presented to the emergency department due to worsening auditory hallucinations, suicidal ideation  · Further plan per Psychiatry    VTE Prophylaxis:   Low Risk (Score 0-2) - Encourage Ambulation  Recommendations for Discharge:  · Follow-up with PCP after discharge    Counseling / Coordination of Care Time: 30 minutes Greater than 50% of total time spent on patient counseling and coordination of care  Collaboration of Care: Were Recommendations Directly Discussed with Primary Treatment Team? No    History of Present Illness:  Toby Abdalla is a 47 y o  female who is originally admitted to the psychiatry service due to auditory hallucination  We are consulted for medical clearance      Past medical history significant for depression, borderline personality disorder, hypertension, hyperlipidemia, obesity, seizures  Patient presented to the emergency department due to reported worsening auditory hallucinations telling her to harm herself  Patient has had numerous inpatient psychiatric admissions  Denies chest pain/palpitations, shortness of breath, nausea vomiting, abdominal pain  Reports that she feels mildly dizzy on standing, was able to ambulate independently from the common room back to her bed this morning  Review of Systems:  Review of Systems   Constitutional: Negative for chills, fatigue, fever and unexpected weight change  HENT: Negative for congestion, sore throat and trouble swallowing  Eyes: Negative for photophobia, pain and visual disturbance  Respiratory: Negative for cough, shortness of breath and wheezing  Cardiovascular: Negative for chest pain, palpitations and leg swelling  Gastrointestinal: Negative for abdominal pain, constipation, diarrhea, nausea and vomiting  Endocrine: Negative for polyuria  Genitourinary: Negative for difficulty urinating, dysuria, flank pain, hematuria and urgency  Musculoskeletal: Negative for back pain, myalgias, neck pain and neck stiffness  Skin: Negative for pallor and rash  Neurological: Negative for dizziness, tremors, syncope, weakness, light-headedness, numbness and headaches  Hematological: Does not bruise/bleed easily  Psychiatric/Behavioral: Positive for dysphoric mood and hallucinations  Negative for agitation and confusion  The patient is nervous/anxious          Past Medical and Surgical History:   Past Medical History:   Diagnosis Date    Anxiety     Bipolar 1 disorder ([facility] )     Bipolar affective disorder, depressed, severe ([facility] ) 10/10/2021    Borderline personality disorder ([facility] )     CAD (coronary artery disease)     Cognitive impairment     Depression     Dyslipidemia     GERD (gastroesophageal reflux disease)     Hypertension     Obesity     Psychiatric disorder     Psychiatric illness     PTSD (post-traumatic stress disorder)     Schizoaffective disorder (HCC)     Seizure ([address] )        Past Surgical History:   Procedure Laterality Date    APPENDECTOMY      PATIENT DENIES HAVING APPENDIX REMOVED    CHOLECYSTECTOMY      FOOT SURGERY Right        Meds/Allergies:  all medications and allergies reviewed    Allergies: Allergies   Allergen Reactions    Fish Oil - Food Allergy Other (See Comments)     unknown    Fish-Derived Products - Food Allergy Hives       Social History:  Marital Status: Single  Substance Use History:   Social History     Substance and Sexual Activity   Alcohol Use Not Currently     Social History     Tobacco Use   Smoking Status Never Smoker   Smokeless Tobacco Never Used   Tobacco Comment    Pt stated "smokes when stressed"     Social History     Substance and Sexual Activity   Drug Use Not Currently       Family History:  Family History   Problem Relation Age of Onset    Kidney cancer Mother     Cerebral aneurysm Father        Physical Exam:   Vitals:   Blood Pressure: 121/63 (06/24/22 1017)  Pulse: 88 (06/24/22 1017)  Temperature: (!) 97 3 °F (36 3 °C) (06/24/22 1017)  Temp Source: Tympanic (06/24/22 1017)  Respirations: 17 (06/24/22 1017)  Height: 5' 6" (167 6 cm) (06/23/22 1459)  Weight - Scale: 124 kg (274 lb) (06/23/22 1459)  SpO2: 98 % (06/24/22 0725)    Physical Exam  Vitals and nursing note reviewed  Constitutional:       Appearance: Normal appearance  Comments: No acute distress   HENT:      Head: Normocephalic  Eyes:      General: No scleral icterus  Extraocular Movements: Extraocular movements intact  Conjunctiva/sclera: Conjunctivae normal    Cardiovascular:      Rate and Rhythm: Normal rate and regular rhythm  Heart sounds: S1 normal and S2 normal    Pulmonary:      Effort: Pulmonary effort is normal       Breath sounds: Normal breath sounds   No wheezing, rhonchi or rales  Abdominal:      General: Bowel sounds are normal       Palpations: Abdomen is soft  Tenderness: There is no abdominal tenderness  There is no guarding or rebound  Musculoskeletal:         General: No swelling, tenderness or deformity  Cervical back: Normal range of motion  Comments: Able to move upper/lower extremities bilaterally without difficulty, no edema   Skin:     General: Skin is warm and dry  Neurological:      Mental Status: She is alert and oriented to person, place, and time  Psychiatric:         Mood and Affect: Affect is flat  Speech: Speech is delayed  Behavior: Behavior normal           Additional Data:   Lab Results:    Results from last 7 days   Lab Units 06/21/22  1736   WBC Thousand/uL 9 17   HEMOGLOBIN g/dL 11 8   HEMATOCRIT % 35 4   PLATELETS Thousands/uL 168   LYMPHO PCT % 31   MONO PCT % 1*   EOS PCT % 2     Results from last 7 days   Lab Units 06/21/22  1736   SODIUM mmol/L 136   POTASSIUM mmol/L 4 4   CHLORIDE mmol/L 100   CO2 mmol/L 28   BUN mg/dL 16   CREATININE mg/dL 0 84   ANION GAP mmol/L 8   CALCIUM mg/dL 9 1   ALBUMIN g/dL 3 3*   TOTAL BILIRUBIN mg/dL 0 40   ALK PHOS U/L 69   ALT U/L 21   AST U/L 14   GLUCOSE RANDOM mg/dL 91             Lab Results   Component Value Date/Time    HGBA1C 4 8 12/31/2021 07:22 AM    HGBA1C 4 8 08/31/2021 07:10 AM    HGBA1C 4 7 05/25/2021 07:10 AM    HGBA1C 5 2 12/21/2019 08:10 AM    HGBA1C 4 8 10/19/2019 07:17 AM               Imaging: No pertinent imaging reviewed  No orders to display       EKG, Pathology, and Other Studies Reviewed on Admission:   · EKG: NSR  HR 82     ** Please Note: This note may have been constructed using a voice recognition system   **
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
## 2022-06-24 NOTE — NURSING NOTE
Pt has been attending some groups throughout the day  Denies SI/HI, reports having AH however they are improving and no longer command in nature  Pt denies VH  Reports mild anxiety  Pt remains calm and cooperative  Reports HA in the afternoon, provided with prn and ice pack  Pt currently resting in room

## 2022-06-24 NOTE — TREATMENT TEAM
06/24/22 1307   Team Meeting   Meeting Type Tx Team Meeting   Initial Conference Date 06/24/22   Team Members Present   Team Members Present Physician;Nurse;   Physician Team Member Dr Naomi Butcher Team Member [facility] Management Team Member Klaudia   Patient/Family Present   Patient Present Yes   Patient's Family Present No     Treatment goals include: remaining safe on unit, decreasing depression/anxiety, maintaining appropriate behavior, addressing self care deficit, discharge planning

## 2022-06-24 NOTE — PLAN OF CARE
Problem: SELF HARM/SUICIDALITY  Goal: Will have no self-injury during hospital stay  Description: INTERVENTIONS:  - Q 15 MINUTES: Routine safety checks  - Q WAKING SHIFT & PRN: Assess risk to determine if routine checks are adequate to maintain patient safety  - Encourage patient to participate actively in care by formulating a plan to combat response to suicidal ideation, identify supports and resources  Outcome: Progressing     Problem: DEPRESSION  Goal: Will be euthymic at discharge  Description: INTERVENTIONS:  - Administer medication as ordered  - Provide emotional support via 1:1 interaction with staff  - Encourage involvement in milieu/groups/activities  - Monitor for social isolation  Outcome: Progressing     Problem: BEHAVIOR  Goal: Pt/Family maintain appropriate behavior and adhere to behavioral management agreement, if implemented  Description: INTERVENTIONS:  - Assess the family dynamic   - Encourage verbalization of thoughts and concerns in a socially appropriate manner  - Assess patient/family's coping skills and non-compliant behavior (including use of illegal substances)  - Utilize positive, consistent limit setting strategies supporting safety of patient, staff and others  - Initiate consult with Case Management, Spiritual Care or other ancillary services as appropriate  - If a patient's/visitor's behavior jeopardizes the safety of the patient, staff, or others, refer to organization procedure     - Notify Security of behavior or suspected illegal substances which indicate the need for search of the patient and/or belongings  - Encourage participation in the decision making process about a behavioral management agreement; implement if patient meets criteria  Outcome: Progressing     Problem: ANXIETY  Goal: Will report anxiety at manageable levels  Description: INTERVENTIONS:  - Administer medication as ordered  - Teach and encourage coping skills  - Provide emotional support  - Assess patient/family for anxiety and ability to cope  Outcome: Progressing  Goal: By discharge: Patient will verbalize 2 strategies to deal with anxiety  Description: Interventions:  - Identify any obvious source/trigger to anxiety  - Staff will assist patient in applying identified coping technique/skills  - Encourage attendance of scheduled groups and activities  Outcome: Progressing

## 2022-06-24 NOTE — CMS CERTIFICATION NOTE
Recertification: Based upon physical, mental and social evaluations, I certify that inpatient psychiatric services continue to be medically necessary for this patient for a duration of 7 midnights for the treatment of  Major depression with psychotic features [facility])   Available alternative community resources still do not meet the patient's mental health care needs  I further attest that an established written individualized plan of care has been updated and is outlined in the patient's medical records

## 2022-06-24 NOTE — CASE MANAGEMENT
LVM for [facility] [phone] requesting c/b to secure follow-up appts  LVM for pt's care coordinator at [facility], [address] requesting c/b

## 2022-06-24 NOTE — CASE MANAGEMENT
Psychosocial Assessment 1:1 completed with pt in pt's bedroom  Calm, pleasant, cooperative, blunted affect  Admission / Details: 201 admission from [address] ED for SI with plan to cut self  Ptused thumbtack to make superficial scratches on L forearm  County: [place] Status: 201  Insurance: Medicare A&B / Elly Lamb  Medicaid  Rx coverage: Yes  Marital Status: Single, never   Children: None  Family: Parents , no siblings  Residence: [address]  Can return home: Yes  Lives with: [facility] residents and staff  Level of Ed: HS, special education   Work History: Unemployed  Income/Source: SSDI  Oriental orthodox: Hinduism  Transportation: [address] [place] staff  Legal Issues: Denies  Pharmacy:  [facility], F  [phone]  [address] Tx HX: Hx of Major Depression with Psychotic Features  5/15/2022-2022: [facility]  2022 to 2022: [facility]  2022 to 2022: [facility] [address] [place] [address] [facility]  2/10/2022 to 2022: [facility]  2021 to 2022: Naresh[facility]  -: [facility]  -2021: [facility]  -: [facility]  10/9-15/2021: [facility]  2021: Julius  2021: Chandrika  2021: [place]  2021: Irma Garvin  : [place]  2021: [facility]  -: [facility]  Pt reported first hospitalization at age 18 y/o  D&A HX: Denies  Medical: PMH of seizures, HTN, hyperlipidemia, class 3 severe obesity  Tobacco: Denies   HX: Denies  Access to firearms: Denies  UDS Results: Negative  PCP: Mehdi Maguire  Psych: Garett Alcaraz at [facility]  Therapist: Josette Jeronimo at [facility]  ICM/ACT:  Has residential care coordinator - [facility]  Community Supports: [address] [place]  Stressors: Misses boyfriend and feels lonely  Coping Skills:  Word search puzzles  ROIS Signed: Willem Cerda (aunt), Rhonda Fraser (boyfriend), Latonia Mark, [address], PCP  Treatment Plan Signed: TBD  IMM Signed: Yes  Upcoming Appointments: Unknown  Discharge disposition: [address] [place]

## 2022-06-24 NOTE — DISCHARGE INSTR - OTHER ORDERS
Triggers you have identified during your hospitalization that led to your admission include: loneliness and missing your boyfriend  Coping skills you have identified during your hospitalization include: coloring and word search puzzles  If you are unable to deal with your distressed mood alone, please contact your outpatient provider(s) at [facility] [place] [facility] Recovery  If that is not effective and you continue to have suicidal ideation, a distressed mood, or feel like you're in crisis, please contact 911 and go to the nearest emergency center  [employer] is a confidential 7 days/week telephone support service manned by trained mental health consumers  [facility] operates daily but is not able to accept calls between 2AM-6AM    [facility] provides support, a listening ear and can provide information about available services  [facility] specializes in the concerns of mental health consumers, their families and friends  However, we are also here for anyone who has a mental health concern, is confused about or just doesn't know anything about mental health or where to get information  To reach Gautam Barraza, call [phone] accepts calls between 6:00 AM to 10:00 AM and from 4:00 PM to 12:00 AM    Text CONNECT to [phone] from anywhere in the [place], anytime, about any type of crisis  A live, trained Crisis Counselor receives the text and lets you know that they are here to listen  The volunteer Crisis Counselor will help you move from a hot moment to a cool moment  [facility] ([facility]) AT [facility] - licensed telephone and mobile crisis services that provide mental health assessments to all age groups regardless of income or insurance  [facility] operates 24-hour/7 days a week  [address] [place] [address] assists consumer who are experiencing a mental health emergency and lack the resources to assist themselves    Immediate intervention for suicidal and depressed individuals with home visits/outreach being top priority  Crisis can be contacted at [phone]  The [facility] ([facility]) offers various education & support groups for you & your family  For more information visit their website at   http://PlayerDuel/   Dial 2-1-1 to get connected/get help  Free, confidential information & referral available 24/7: Aging Services, Child & Youth Services, Counseling, Education/Training, Food/Shelter/Clothing, Health Services, Parenting, Substance Abuse, Support Groups, Volunteer Opportunities, & much more  Phone: 2-1-1 or [phone], Web: YVESLAINEY [email or web address] [email or web address], Email: Rebecca@[employer]    [facility]  The [facility] ([address], [place], [address] [place] [address]) offers persons with a mental illness a safe, healing environment to explore their personal and vocational potential and receive support in achieving their goals  Instead of traditional therapy, the work of the house is the rehabilitation  As members contribute meaningful work to the house, they build confidence and a sense of purpose  Be a Member - It's Free  Membership in the [facility] is open to any [facility] resident who is 25 or older and has a history of mental illness  Membership is free for life  [facility]  [address], [place], [address] [place] [address]  Hours: Monday - Friday: 8 a m  - 4 p m  With varying hours on holidays   ([address] of [facility] ([address], [place], [place]) provides a stress-free atmosphere for persons 18 and older who have experienced mental health issues    What The [address]  Holiday gatherings  Recovery  Employment  Education  Community Resources  Empowerment  Helps participants achieve their goals  Enhances quality of life  Encourages leadership    The [address]  Hours: Monday through Friday: 4 p m  - 9 p m   Saturday: 2 p m  - 8 p m  Closed Sundays  Varying hours on holidays            Contact [phone]    Support & Referral Helpline  Support and Referral Helpline is a 24-hour, 7 days-a-week, listening and referral service provided to all of [place]  Please call [phone] or tty [phone] to speak with one of our specialists  The helpline is possible through partnerships with the [email or web address] for [employer]

## 2022-06-25 PROCEDURE — 99232 SBSQ HOSP IP/OBS MODERATE 35: CPT | Performed by: PSYCHIATRY & NEUROLOGY

## 2022-06-25 RX ORDER — MIRTAZAPINE 15 MG/1
45 TABLET, FILM COATED ORAL
Status: DISCONTINUED | OUTPATIENT
Start: 2022-06-26 | End: 2022-06-28 | Stop reason: HOSPADM

## 2022-06-25 RX ADMIN — ASPIRIN 81 MG CHEWABLE TABLET 81 MG: 81 TABLET CHEWABLE at 08:54

## 2022-06-25 RX ADMIN — PRAZOSIN HYDROCHLORIDE 2 MG: 1 CAPSULE ORAL at 21:21

## 2022-06-25 RX ADMIN — MIRTAZAPINE 30 MG: 15 TABLET, FILM COATED ORAL at 21:21

## 2022-06-25 RX ADMIN — LISINOPRIL 20 MG: 20 TABLET ORAL at 08:54

## 2022-06-25 RX ADMIN — HALOPERIDOL 10 MG: 10 TABLET ORAL at 17:13

## 2022-06-25 RX ADMIN — DIVALPROEX SODIUM 2500 MG: 500 TABLET, EXTENDED RELEASE ORAL at 21:22

## 2022-06-25 RX ADMIN — ATORVASTATIN CALCIUM 40 MG: 40 TABLET, FILM COATED ORAL at 17:13

## 2022-06-25 RX ADMIN — SENNOSIDES AND DOCUSATE SODIUM 1 TABLET: 50; 8.6 TABLET ORAL at 17:13

## 2022-06-25 RX ADMIN — HALOPERIDOL 10 MG: 10 TABLET ORAL at 08:54

## 2022-06-25 RX ADMIN — SENNOSIDES AND DOCUSATE SODIUM 1 TABLET: 50; 8.6 TABLET ORAL at 08:54

## 2022-06-25 NOTE — NURSING NOTE
Pt OOB for breakfast   Napping in room throughout the morning  Declines lunch, stating not hungry at this time  Denies SI/HI/AVH

## 2022-06-25 NOTE — PLAN OF CARE
Problem: SELF HARM/SUICIDALITY  Goal: Will have no self-injury during hospital stay  Description: INTERVENTIONS:  - Q 15 MINUTES: Routine safety checks  - Q WAKING SHIFT & PRN: Assess risk to determine if routine checks are adequate to maintain patient safety  - Encourage patient to participate actively in care by formulating a plan to combat response to suicidal ideation, identify supports and resources  6/25/2022 1704 by Rogelio Calvo RN  Outcome: Progressing  6/25/2022 1703 by Rogelio Calvo RN  Outcome: Progressing     Problem: DEPRESSION  Goal: Will be euthymic at discharge  Description: INTERVENTIONS:  - Administer medication as ordered  - Provide emotional support via 1:1 interaction with staff  - Encourage involvement in milieu/groups/activities  - Monitor for social isolation  6/25/2022 1704 by Rogelio Calvo RN  Outcome: Progressing  6/25/2022 1703 by Rogelio Calvo RN  Outcome: Progressing

## 2022-06-25 NOTE — PLAN OF CARE
Problem: DEPRESSION  Goal: Will be euthymic at discharge  Description: INTERVENTIONS:  - Administer medication as ordered  - Provide emotional support via 1:1 interaction with staff  - Encourage involvement in milieu/groups/activities  - Monitor for social isolation  Outcome: Progressing     Problem: ANXIETY  Goal: Will report anxiety at manageable levels  Description: INTERVENTIONS:  - Administer medication as ordered  - Teach and encourage coping skills  - Provide emotional support  - Assess patient/family for anxiety and ability to cope  Outcome: Progressing  Goal: By discharge: Patient will verbalize 2 strategies to deal with anxiety  Description: Interventions:  - Identify any obvious source/trigger to anxiety  - Staff will assist patient in applying identified coping technique/skills  - Encourage attendance of scheduled groups and activities  Outcome: Progressing

## 2022-06-25 NOTE — TREATMENT TEAM
06/25/22 0900   Team Meeting   Meeting Type Daily Rounds   Team Members Present   Team Members Present Physician;Nurse   Physician Team Member Dr Stanislav Padron Team Member Gavin Helton   Patient/Family Present   Patient Present No   Patient's Family Present No     Daily Rounds: Pt denies SI/HI  AH present but not bothersome  Encouraged to shower, does require some assistance  Pt is ordered orthostatic BP q shift

## 2022-06-26 PROCEDURE — 99232 SBSQ HOSP IP/OBS MODERATE 35: CPT | Performed by: PSYCHIATRY & NEUROLOGY

## 2022-06-26 RX ORDER — XYLITOL/YERBA SANTA
5 AEROSOL, SPRAY WITH PUMP (ML) MUCOUS MEMBRANE 4 TIMES DAILY PRN
Status: DISCONTINUED | OUTPATIENT
Start: 2022-06-26 | End: 2022-06-28 | Stop reason: HOSPADM

## 2022-06-26 RX ADMIN — DIVALPROEX SODIUM 2500 MG: 500 TABLET, EXTENDED RELEASE ORAL at 21:23

## 2022-06-26 RX ADMIN — HALOPERIDOL 10 MG: 10 TABLET ORAL at 08:58

## 2022-06-26 RX ADMIN — SENNOSIDES AND DOCUSATE SODIUM 1 TABLET: 50; 8.6 TABLET ORAL at 08:58

## 2022-06-26 RX ADMIN — MIRTAZAPINE 45 MG: 15 TABLET, FILM COATED ORAL at 21:23

## 2022-06-26 RX ADMIN — HALOPERIDOL 7 MG: 5 TABLET ORAL at 21:22

## 2022-06-26 RX ADMIN — PRAZOSIN HYDROCHLORIDE 2 MG: 1 CAPSULE ORAL at 21:23

## 2022-06-26 RX ADMIN — ASPIRIN 81 MG CHEWABLE TABLET 81 MG: 81 TABLET CHEWABLE at 08:58

## 2022-06-26 RX ADMIN — ACETAMINOPHEN 650 MG: 325 TABLET, FILM COATED ORAL at 16:01

## 2022-06-26 RX ADMIN — ATORVASTATIN CALCIUM 40 MG: 40 TABLET, FILM COATED ORAL at 15:42

## 2022-06-26 RX ADMIN — SENNOSIDES AND DOCUSATE SODIUM 1 TABLET: 50; 8.6 TABLET ORAL at 17:18

## 2022-06-26 RX ADMIN — HALOPERIDOL 10 MG: 10 TABLET ORAL at 17:18

## 2022-06-26 NOTE — TREATMENT TEAM
06/26/22 1300   Team Meeting   Meeting Type Daily Rounds   Team Members Present   Team Members Present Physician;Nurse   Physician Team Member Dr Khai Longo Team Member Tahir Pascual   Patient/Family Present   Patient Present No   Patient's Family Present No     Daily Rounds: Pt stable on unit

## 2022-06-26 NOTE — NURSING NOTE
Calm and cooperative this a m  compliant with medication administration  During morning medication administration had complaints of feeling tired and is currently sleeping  Denies SI/HI and verbally consents to safety on the unit  Participating in meal times  Denies questions/concerns during interview

## 2022-06-26 NOTE — PROGRESS NOTES
Progress Note - Behavioral Health   Leydi Buckner 47 y o  female MRN: [id number]  Unit/Bed#: U 254-01 Encounter: [id number]    Assessment/Plan   Principal Problem:    Major depression with psychotic features ([address] )  Active Problems:    Primary hypertension    Hyperlipidemia    Class 3 severe obesity due to excess calories without serious comorbidity in [facility])    PTSD (post-traumatic stress disorder)    Borderline personality disorder (HCC)    Seizures (HCC)    PLANS:  Increased Mirtazapine to 45mg for depression  Cont Haldol 10mg , cont   Medical medications  Encouraged participation on milieu and groups  Cont risk assessment  Discharge planning; discussed with treatment team     Behavior over the last 24 hours:  Unchanged   Sleep: hypersomnolence   Appetite: stable   Medication side effects: denies  ROS: no complaints    Subjective:   Leydi was  Non visible on the unit throughout the day, mostly reclusive to her room  Later was seen there individual with sound cancellation music on  Acknowledged sleeping all day, bc " was tired  + SI reported knowing if thoughts occur to " talk to staff   + auditory hallucinations " demanding her to hurt herself"described as her own voice  Voice cancellation sound box helps  Also thinks her meds are working but unsure what has improved  On Haldol 10mg and Mirtazapine 30mg ( ~4 wks)  Reported history of Depakote 2500 for seizures, have been without x 5 yrs  Mental Status Evaluation:  Appearance:  Leydi is a 47 yr old  female, look stated age  Non disheveled, Non malodorous, seen laying down, with left arm superficial cut/ laceration     Behavior:  evasive slightly    Speech:  normal volume and WNL rate, prosody, coherent    Mood:  normal   Affect:  slightly  distressed appearing / anxious   Thought Process:  logical and linear    Thought Content:  normal   Perceptual Disturbances:  Auditory hallucinations with commands to hurt herself   Risk Potential: Suicidal Ideations: + SI, w/o plan or intent  Homicidal Ideations ; denies   Potential for Aggression : no   Sensorium:  person, place, time/date and situation   Memory:  recent and remote memory grossly intact   Consciousness:  alert and awake    Attention: Non-impaired    Insight:  good   Judgment: good   Gait/Station: normal gait/station   Motor Activity: no abnormal movements     Progress Toward Goals: unchanged     Recommended Treatment: Continue with group therapy, milieu therapy and occupational therapy  Risks, benefits and possible side effects of Medications:   Risks, benefits, and possible side effects of medications explained to patient and patient verbalizes understanding  Medications: all current active meds have been reviewed  Labs: I have personally reviewed all pertinent laboratory/tests results  Most Recent Labs:   Lab Results   Component Value Date    WBC 9 17 06/21/2022    RBC 3 63 (L) 06/21/2022    HGB 11 8 06/21/2022    HCT 35 4 06/21/2022     06/21/2022    RDW 14 3 06/21/2022    NEUTROABS 7 02 04/25/2022    SODIUM 136 06/21/2022    K 4 4 06/21/2022     06/21/2022    CO2 28 06/21/2022    BUN 16 06/21/2022    CREATININE 0 84 06/21/2022    GLUC 91 06/21/2022    GLUF 91 05/05/2022    CALCIUM 9 1 06/21/2022    AST 14 06/21/2022    ALT 21 06/21/2022    ALKPHOS 69 06/21/2022    TP 6 9 06/21/2022    ALB 3 3 (L) 06/21/2022    TBILI 0 40 06/21/2022    CHOLESTEROL 169 04/28/2022    HDL 45 (L) 04/28/2022    TRIG 180 (H) 04/28/2022    LDLCALC 88 04/28/2022    NONHDLC 124 04/28/2022    VALPROICTOT 85 06/21/2022    QFV2JFDGXQCW 2 630 06/21/2022    PREGSERUM Negative 04/28/2022    RPR Non-Reactive 02/13/2022    HGBA1C 4 8 12/31/2021    EAG 91 12/31/2021       Counseling / Coordination of Care  Total floor / unit time spent today 10 minutes  Greater than 50% of total time was spent with the patient and / or family counseling and / or coordination of care   A description of the counseling / coordination of care: Regarding risk assessment and medication adjustments

## 2022-06-27 PROCEDURE — 99232 SBSQ HOSP IP/OBS MODERATE 35: CPT | Performed by: PHYSICIAN ASSISTANT

## 2022-06-27 PROCEDURE — 0241U HB NFCT DS VIR RESP RNA 4 TRGT: CPT | Performed by: PHYSICIAN ASSISTANT

## 2022-06-27 RX ORDER — AMOXICILLIN 250 MG
1 CAPSULE ORAL 2 TIMES DAILY
Qty: 60 TABLET | Refills: 0 | Status: ON HOLD | OUTPATIENT
Start: 2022-06-27 | End: 2022-08-02

## 2022-06-27 RX ORDER — HALOPERIDOL 10 MG/1
10 TABLET ORAL 2 TIMES DAILY
Status: DISCONTINUED | OUTPATIENT
Start: 2022-06-27 | End: 2022-06-28 | Stop reason: HOSPADM

## 2022-06-27 RX ORDER — DIVALPROEX SODIUM 500 MG/1
2500 TABLET, EXTENDED RELEASE ORAL
Qty: 150 TABLET | Refills: 0 | Status: ON HOLD | OUTPATIENT
Start: 2022-06-27 | End: 2022-08-02

## 2022-06-27 RX ORDER — MIRTAZAPINE 45 MG/1
45 TABLET, FILM COATED ORAL
Qty: 30 TABLET | Refills: 1 | Status: ON HOLD | OUTPATIENT
Start: 2022-06-27 | End: 2022-08-02 | Stop reason: SDUPTHER

## 2022-06-27 RX ORDER — LISINOPRIL 20 MG/1
20 TABLET ORAL DAILY
Qty: 30 TABLET | Refills: 0 | Status: ON HOLD | OUTPATIENT
Start: 2022-06-27 | End: 2022-08-02

## 2022-06-27 RX ORDER — PRAZOSIN HYDROCHLORIDE 2 MG/1
2 CAPSULE ORAL
Qty: 30 CAPSULE | Refills: 0 | Status: ON HOLD | OUTPATIENT
Start: 2022-06-27 | End: 2022-08-02

## 2022-06-27 RX ORDER — ASPIRIN 81 MG/1
81 TABLET, CHEWABLE ORAL DAILY
Qty: 30 TABLET | Refills: 0 | Status: ON HOLD | OUTPATIENT
Start: 2022-06-27 | End: 2022-08-02

## 2022-06-27 RX ORDER — HALOPERIDOL 10 MG/1
10 TABLET ORAL 2 TIMES DAILY
Qty: 60 TABLET | Refills: 1 | Status: SHIPPED | OUTPATIENT
Start: 2022-06-27 | End: 2022-07-27

## 2022-06-27 RX ORDER — ATORVASTATIN CALCIUM 40 MG/1
40 TABLET, FILM COATED ORAL
Qty: 30 TABLET | Refills: 0 | Status: ON HOLD | OUTPATIENT
Start: 2022-06-27 | End: 2022-08-02

## 2022-06-27 RX ORDER — HALOPERIDOL 10 MG/1
10 TABLET ORAL 2 TIMES DAILY
Qty: 60 TABLET | Refills: 0 | Status: ON HOLD | OUTPATIENT
Start: 2022-06-27 | End: 2022-08-02

## 2022-06-27 RX ORDER — MIRTAZAPINE 30 MG/1
30 TABLET, FILM COATED ORAL
Qty: 30 TABLET | Refills: 0 | Status: SHIPPED | OUTPATIENT
Start: 2022-06-27 | End: 2022-07-27

## 2022-06-27 RX ADMIN — SENNOSIDES AND DOCUSATE SODIUM 1 TABLET: 50; 8.6 TABLET ORAL at 09:00

## 2022-06-27 RX ADMIN — ASPIRIN 81 MG CHEWABLE TABLET 81 MG: 81 TABLET CHEWABLE at 09:00

## 2022-06-27 RX ADMIN — SENNOSIDES AND DOCUSATE SODIUM 1 TABLET: 50; 8.6 TABLET ORAL at 17:03

## 2022-06-27 RX ADMIN — MIRTAZAPINE 45 MG: 15 TABLET, FILM COATED ORAL at 21:13

## 2022-06-27 RX ADMIN — ATORVASTATIN CALCIUM 40 MG: 40 TABLET, FILM COATED ORAL at 16:15

## 2022-06-27 RX ADMIN — LISINOPRIL 20 MG: 20 TABLET ORAL at 09:00

## 2022-06-27 RX ADMIN — DIVALPROEX SODIUM 2500 MG: 500 TABLET, EXTENDED RELEASE ORAL at 21:12

## 2022-06-27 RX ADMIN — HALOPERIDOL 7 MG: 5 TABLET ORAL at 09:00

## 2022-06-27 RX ADMIN — HALOPERIDOL 10 MG: 10 TABLET ORAL at 21:13

## 2022-06-27 RX ADMIN — PRAZOSIN HYDROCHLORIDE 2 MG: 1 CAPSULE ORAL at 21:12

## 2022-06-27 NOTE — NURSING NOTE
Pt remains pleasant and calm in conversation  Denies SI/HI, AVH  Pt taking naps at times reporting feeling tired and wanting to rest  Declined to attend groups  Denies any concerns at this time

## 2022-06-27 NOTE — PLAN OF CARE
Problem: SELF HARM/SUICIDALITY  Goal: Will have no self-injury during hospital stay  Description: INTERVENTIONS:  - Q 15 MINUTES: Routine safety checks  - Q WAKING SHIFT & PRN: Assess risk to determine if routine checks are adequate to maintain patient safety  - Encourage patient to participate actively in care by formulating a plan to combat response to suicidal ideation, identify supports and resources  Outcome: Progressing     Problem: DEPRESSION  Goal: Will be euthymic at discharge  Description: INTERVENTIONS:  - Administer medication as ordered  - Provide emotional support via 1:1 interaction with staff  - Encourage involvement in milieu/groups/activities  - Monitor for social isolation  Outcome: Progressing     Problem: BEHAVIOR  Goal: Pt/Family maintain appropriate behavior and adhere to behavioral management agreement, if implemented  Description: INTERVENTIONS:  - Assess the family dynamic   - Encourage verbalization of thoughts and concerns in a socially appropriate manner  - Assess patient/family's coping skills and non-compliant behavior (including use of illegal substances)  - Utilize positive, consistent limit setting strategies supporting safety of patient, staff and others  - Initiate consult with Case Management, Spiritual Care or other ancillary services as appropriate  - If a patient's/visitor's behavior jeopardizes the safety of the patient, staff, or others, refer to organization procedure     - Notify Security of behavior or suspected illegal substances which indicate the need for search of the patient and/or belongings  - Encourage participation in the decision making process about a behavioral management agreement; implement if patient meets criteria  Outcome: Progressing     Problem: ANXIETY  Goal: Will report anxiety at manageable levels  Description: INTERVENTIONS:  - Administer medication as ordered  - Teach and encourage coping skills  - Provide emotional support  - Assess patient/family for anxiety and ability to cope  Outcome: Progressing  Goal: By discharge: Patient will verbalize 2 strategies to deal with anxiety  Description: Interventions:  - Identify any obvious source/trigger to anxiety  - Staff will assist patient in applying identified coping technique/skills  - Encourage attendance of scheduled groups and activities  Outcome: Progressing     Problem: SELF CARE DEFICIT  Goal: Return ADL status to a safe level of function  Description: INTERVENTIONS:  - Administer medication as ordered  - Assess ADL deficits and provide assistive devices as needed  - Obtain PT/OT consults as needed  - Assist and instruct patient to increase activity and self care as tolerated  Outcome: Progressing     Problem: DISCHARGE PLANNING  Goal: Discharge to home or other facility with appropriate resources  Description: INTERVENTIONS:  - Identify barriers to discharge w/patient and caregiver  - Arrange for needed discharge resources and transportation as appropriate  - Identify discharge learning needs (meds, wound care, etc )  - Arrange for interpretive services to assist at discharge as needed  - Refer to Case Management Department for coordinating discharge planning if the patient needs post-hospital services based on physician/advanced practitioner order or complex needs related to functional status, cognitive ability, or social support system  Outcome: Progressing     Problem: Ineffective Coping  Goal: Participates in unit activities  Description: Interventions:  - Provide therapeutic environment   - Provide required programming   - Redirect inappropriate behaviors   Outcome: Progressing

## 2022-06-27 NOTE — NURSING NOTE
Pt declined to eat lunch earlier due to feeling tired however did attend dinner  Denies SI/HI, AVH  Reports "a little bit" of anxiety and denies depression  Pt states feeling ready for discharge  Discussed with patient healthy coping skills to utilize when feeling anxious  Pt states usually talking to her boyfriend and sleeping as her coping skills  Encouraged pt to practice other coping skills such as deep breathing, walking and music

## 2022-06-27 NOTE — CASE MANAGEMENT
ROSE contacted [facility] [place] @ [phone] & left a message for her regarding d/c planning for tomorrow for Pt  ROSE called back & left a message at ext [phone] for Rob North, the other care coordinator  ROSE contacted [address] @ [phone] & left a message seeking to schedule Pt a follow-up appointment for med management  CM called back for Pt's therapist, Richelle Kaur, @ ext [phone] & left a message seeking to schedule follow-up as well

## 2022-06-27 NOTE — PLAN OF CARE
Problem: SELF HARM/SUICIDALITY  Goal: Will have no self-injury during hospital stay  Description: INTERVENTIONS:  - Q 15 MINUTES: Routine safety checks  - Q WAKING SHIFT & PRN: Assess risk to determine if routine checks are adequate to maintain patient safety  - Encourage patient to participate actively in care by formulating a plan to combat response to suicidal ideation, identify supports and resources  Outcome: Progressing     Problem: DEPRESSION  Goal: Will be euthymic at discharge  Description: INTERVENTIONS:  - Administer medication as ordered  - Provide emotional support via 1:1 interaction with staff  - Encourage involvement in milieu/groups/activities  - Monitor for social isolation  Outcome: Progressing     Problem: BEHAVIOR  Goal: Pt/Family maintain appropriate behavior and adhere to behavioral management agreement, if implemented  Description: INTERVENTIONS:  - Assess the family dynamic   - Encourage verbalization of thoughts and concerns in a socially appropriate manner  - Assess patient/family's coping skills and non-compliant behavior (including use of illegal substances)  - Utilize positive, consistent limit setting strategies supporting safety of patient, staff and others  - Initiate consult with Case Management, Spiritual Care or other ancillary services as appropriate  - If a patient's/visitor's behavior jeopardizes the safety of the patient, staff, or others, refer to organization procedure     - Notify Security of behavior or suspected illegal substances which indicate the need for search of the patient and/or belongings  - Encourage participation in the decision making process about a behavioral management agreement; implement if patient meets criteria  Outcome: Progressing     Problem: ANXIETY  Goal: Will report anxiety at manageable levels  Description: INTERVENTIONS:  - Administer medication as ordered  - Teach and encourage coping skills  - Provide emotional support  - Assess patient/family for anxiety and ability to cope  Outcome: Progressing  Goal: By discharge: Patient will verbalize 2 strategies to deal with anxiety  Description: Interventions:  - Identify any obvious source/trigger to anxiety  - Staff will assist patient in applying identified coping technique/skills  - Encourage attendance of scheduled groups and activities  Outcome: Progressing     Problem: SELF CARE DEFICIT  Goal: Return ADL status to a safe level of function  Description: INTERVENTIONS:  - Administer medication as ordered  - Assess ADL deficits and provide assistive devices as needed  - Obtain PT/OT consults as needed  - Assist and instruct patient to increase activity and self care as tolerated  Outcome: Progressing

## 2022-06-27 NOTE — PLAN OF CARE
Pt participated in 0/4 groups for the day    Problem: Ineffective Coping  Goal: Participates in unit activities  Description: Interventions:  - Provide therapeutic environment   - Provide required programming   - Redirect inappropriate behaviors   Outcome: Not Progressing

## 2022-06-27 NOTE — NURSING NOTE
Patient continues with treatment  Isolative to room most of the time  She is AAOX4, fair eye contact  Patient is dressed appropriately for time and situation but  appears unkempt  Affect is constricted consistent with low mood  Patient is pleasant and interactive on approach  She reports low energy  Rates anxiety and depression 5/10  Denies SI/HI and reports no hallucinations

## 2022-06-27 NOTE — PROGRESS NOTES
Progress Note - Behavioral Health   Leydi Askew 47 y o  female MRN: [id number]  Unit/Bed#: U 254-01 Encounter: [id number]    Assessment/Plan   Principal Problem:    Major depression with psychotic features ([address] )  Active Problems:    Primary hypertension    Hyperlipidemia    Class 3 severe obesity due to excess calories without serious comorbidity in [facility])    PTSD (post-traumatic stress disorder)    Borderline personality disorder (HCC)    Seizures (HCC)        PLANS:  Pt endorsing auditory hallucination, also with hypersomnolence and dry mouth   Lowered Haldol 10mg BID to 7mg BID starting tomorrow  Cont Mirtazapine 45mg  PRN Mouth Kote    Encouraged participation on milieu and groups  Cont risk assessment  Discussed with treatment team   Discharge plannin - 6 days     Behavior over the last 24 hours:  Unchanged   Sleep: hypersomnolence   Appetite: stable   Medication side effects: dry mouth, sedation   ROS: no complaints    Subjective:  Leydi has not been visible on the unit  Was seen in room sleeping , arousal to her name called  Reported sleeping most of the day and unsure as to why  Appetite is stable, Ate breakfast and dinner  " missed lunch was sleeping  Was informed of possible medication side effects with sleeping  MSE findings of dry mouth  Informed of plan to lower Haldol,  Leydi was accepting  Also , reported + hearing voices  Appears in distress regarding topic  Denied  SI , HI     Mental Status Evaluation:  General Appearance:  Leydi is a 47 yr old  female, look stated age  Non disheveled, Non malodorous, seen laying down     Behavior:  evasive    Speech:  normal volume and WNL rate, prosody, coherent    Mood:  normal   Affect:  slightly  distressed appearing / anxious   Thought Process:  logical and linear , goal directed    Thought Content:  normal   Perceptual Disturbances:  Auditory hallucinations    Risk Potential: Suicidal Ideations: - SI, w/o plan or intent  Homicidal Ideations ; denies   Potential for Aggression : no   Sensorium:  person, place, time/date and situation   Memory:  recent and remote memory grossly intact   Consciousness:  alert and awake    Attention: Non-impaired    Insight:  good   Judgment: good   Gait/Station: normal gait/station   Motor Activity: no abnormal movements     Vitals:    06/26/22 1546   BP: 138/66   Pulse: 84   Resp: 16   Temp: 97 7 °F (36 5 °C)   SpO2: 93%     Progress Toward Goals: unchanged     Recommended Treatment: Continue with group therapy, milieu therapy and occupational therapy  Risks, benefits and possible side effects of Medications:   Risks, benefits, and possible side effects of medications explained to patient and patient verbalizes understanding  Medications: all current active meds have been reviewed  Labs: I have personally reviewed all pertinent laboratory/tests results  Counseling / Coordination of Care  Total floor / unit time spent today 10 minutes  Greater than 50% of total time was spent with the patient and / or family counseling and / or coordination of care  A description of the counseling / coordination of care: Psychoeducational regarding treatment plan, medication s/e , plans for medication adjustment as treatment

## 2022-06-27 NOTE — PROGRESS NOTES
Progress Note - Behavioral Health     Leyid Khan 47 y o  female MRN: [id number]   Unit/Bed#: U 254-01 Encounter: [id number]    Behavior over the last 24 hours: improving  Leydi is a 60-year-old female with a history of MDD with psychotic features and borderline personality disorder presents for psychiatric follow-up  Staff reports no behavioral issues overnight  She is pleasant, calm and cooperative upon approach  Reports feeling better, mood is improved no longer feeling as depressed and no longer suicidal   Compliant with medications  Remeron was increased to 45 mg at bedtime and Haldol was decreased to 7 mg twice a day  No SI or HI  No AH or VH  Sleep and appetite are regular  Still some decreased motivation and energy but otherwise no acute concerns      Sleep: normal  Appetite: normal  Medication side effects: No   ROS: all other systems are negative    Mental Status Evaluation:    Appearance:  age appropriate, casually dressed, adequate grooming   Behavior:  pleasant, cooperative, calm   Speech:  slow, scant, soft   Mood:  improved, less depressed   Affect:  reactive, slightly brighter   Thought Process:  organized, goal directed, linear   Associations: concrete associations   Thought Content:  no overt delusions   Perceptual Disturbances: no auditory hallucinations, no visual hallucinations, does not appear responding to internal stimuli   Risk Potential: Suicidal ideation - None at present  Homicidal ideation - None at present  Potential for aggression - No   Sensorium:  oriented to person, place and time/date   Memory:  recent and remote memory grossly intact   Consciousness:  alert and awake   Attention/Concentration: attention span and concentration appear shorter than expected for age   Insight:  fair   Judgment: fair   Gait/Station: normal gait/station   Motor Activity: no abnormal movements     Vital signs in last 24 hours:    Temp:  [97 2 °F (36 2 °C)-97 7 °F (36 5 °C)] 97 2 °F (36 2 °C)  HR:  [84-95] 95  Resp:  [16-18] 18  BP: (108-147)/(66-77) 108/68    Laboratory results: I have personally reviewed all pertinent laboratory/tests results    Results from the past 24 hours: No results found for this or any previous visit (from the past 24 hour(s))  Most Recent Labs:   Lab Results   Component Value Date    WBC 9 17 06/21/2022    RBC 3 63 (L) 06/21/2022    HGB 11 8 06/21/2022    HCT 35 4 06/21/2022     06/21/2022    RDW 14 3 06/21/2022    NEUTROABS 7 02 04/25/2022    SODIUM 136 06/21/2022    K 4 4 06/21/2022     06/21/2022    CO2 28 06/21/2022    BUN 16 06/21/2022    CREATININE 0 84 06/21/2022    GLUC 91 06/21/2022    CALCIUM 9 1 06/21/2022    AST 14 06/21/2022    ALT 21 06/21/2022    ALKPHOS 69 06/21/2022    TP 6 9 06/21/2022    ALB 3 3 (L) 06/21/2022    TBILI 0 40 06/21/2022    CHOLESTEROL 169 04/28/2022    HDL 45 (L) 04/28/2022    TRIG 180 (H) 04/28/2022    LDLCALC 88 04/28/2022    [place] 124 04/28/2022    VALPROICTOT 85 06/21/2022    HRG3UGQZPKVF 2 630 06/21/2022    PREGUR negative 04/10/2022    PREGSERUM Negative 04/28/2022    RPR Non-Reactive 02/13/2022    HGBA1C 4 8 12/31/2021    EAG 91 12/31/2021       Progress Toward Goals: progressing, no longer suicidal, working on coping skills, discharge planning    Assessment/Plan   Principal Problem:    Major depression with psychotic features ([place] [id number] )  Active Problems:    Primary hypertension    Hyperlipidemia    Class 3 severe obesity due to excess calories without serious comorbidity in adult [facility])    PTSD (post-traumatic stress disorder)    Borderline personality disorder ([facility] )    Seizures ([place] [address] )      Recommended Treatment:     Planned medication and treatment changes: All current active medications have been reviewed  Encourage group therapy, milieu therapy and occupational therapy  Behavioral Health checks every 7 minutes    Change Haldol back to 10 mg twice a day dosing as she has been stable on this dose for quite some time  Continue all other medications  Discharge planning for tomorrow  Leydi continues to demonstrate a pattern of behavior most consistent with severe borderline personality disorder and decreased tolerance for stress  Repeated inpatient behavioral health admissions will prove to be detrimental for this patient in the long run as it reinforces negative behaviors  She would greatly benefit from dedicated dialectical behavioral therapy in the future      Current Facility-Administered Medications   Medication Dose Route Frequency Provider Last Rate    acetaminophen  650 mg Oral Q6H PRN Collie Pulse, PA-C      acetaminophen  650 mg Oral Q4H PRN Collie Pulse, PA-C      acetaminophen  975 mg Oral Q6H PRN Collie Pulse, PA-C      aluminum-magnesium hydroxide-simethicone  30 mL Oral Q4H PRN Collie Pulse, PA-C      artificial tear  1 application Both Eyes A3Z PRN Collie Pulse, PA-C      aspirin  81 mg Oral Daily Collie Pulse, PA-C      atorvastatin  40 mg Oral Daily With Sahara Mchugh, PA-C      haloperidol lactate  2 5 mg Intramuscular Q6H PRN Max 4/day Collie Pulse, PA-C      And    LORazepam  1 mg Intramuscular Q6H PRN Max 4/day Collie Pulse, PA-C      And    benztropine  0 5 mg Intramuscular Q6H PRN Max 4/day Collie Pulse, PA-C      haloperidol lactate  5 mg Intramuscular Q4H PRN Max 4/day Collie Pulse, PA-C      And    LORazepam  2 mg Intramuscular Q4H PRN Max 4/day Collie Pulse, PA-C      And    benztropine  1 mg Intramuscular Q4H PRN Max 4/day Collie Pulse, PA-C      benztropine  1 mg Intramuscular Q4H PRN Max 6/day Collie Pulse, PA-C      benztropine  1 mg Oral Q4H PRN Max 6/day Collie Pulse, PA-C      bisacodyl  10 mg Rectal Daily PRN Collie Pulse, PA-C      hydrOXYzine HCL  50 mg Oral Q6H PRN Max 4/day Collie Pulse, PA-C      Or    diphenhydrAMINE  50 mg Intramuscular Q6H PRN Collie Pulse, PA-C      divalproex sodium  2,500 mg Oral HS Jonel Abrams Kapil Mckeon, JO      haloperidol  10 mg Oral BID Sapna Collet, JO      haloperidol  2 mg Oral Q4H PRN Max 6/day Sapna Collet, JO      haloperidol  5 mg Oral Q6H PRN Max 4/day Sapna Collet, PA-IKER      haloperidol  5 mg Oral Q4H PRN Max 4/day Sapna Collet, JO      hydrOXYzine HCL  100 mg Oral Q6H PRN Max 4/day Sapna Collet, JO      Or    LORazepam  2 mg Intramuscular Q6H PRN Sapna Collet, JO      hydrOXYzine HCL  25 mg Oral Q6H PRN Max 4/day Sapna Collet, JO      lisinopril  20 mg Oral Daily Sapna Collet, JO      melatonin  3 mg Oral HS PRN Sapna Collet, JO      mirtazapine  45 mg Oral HS Alberto Riggs DO      polyethylene glycol  17 g Oral Daily PRN Sapna Collet, JO      prazosin  2 mg Oral HS Sapna Collet, JO      saliva substitute  5 spray Mouth/Throat 4x Daily PRN Laxmi Jain,       senna-docusate sodium  1 tablet Oral BID Sapna Collet, JO      senna-docusate sodium  1 tablet Oral Daily PRN Sapna Collet, JO       Risks / Benefits of Treatment:    Risks, benefits, and possible side effects of medications explained to patient and patient verbalizes understanding and agreement for treatment  Counseling / Coordination of Care:    Patient's progress discussed with staff in treatment team meeting  Medications, treatment progress and treatment plan reviewed with patient      Melody Collet, PA-C 06/27/22

## 2022-06-28 VITALS
HEIGHT: 66 IN | WEIGHT: 274 LBS | SYSTOLIC BLOOD PRESSURE: 146 MMHG | TEMPERATURE: 99 F | HEART RATE: 80 BPM | DIASTOLIC BLOOD PRESSURE: 68 MMHG | BODY MASS INDEX: 44.03 KG/M2 | OXYGEN SATURATION: 92 % | RESPIRATION RATE: 16 BRPM

## 2022-06-28 PROCEDURE — 99238 HOSP IP/OBS DSCHRG MGMT 30/<: CPT | Performed by: PHYSICIAN ASSISTANT

## 2022-06-28 RX ADMIN — ASPIRIN 81 MG CHEWABLE TABLET 81 MG: 81 TABLET CHEWABLE at 08:59

## 2022-06-28 RX ADMIN — SENNOSIDES AND DOCUSATE SODIUM 1 TABLET: 50; 8.6 TABLET ORAL at 08:59

## 2022-06-28 RX ADMIN — LISINOPRIL 20 MG: 20 TABLET ORAL at 08:59

## 2022-06-28 RX ADMIN — HALOPERIDOL 10 MG: 10 TABLET ORAL at 08:59

## 2022-06-28 NOTE — TREATMENT TEAM
06/28/22 0900   Activity/Group Checklist   Group Admission/Discharge   Attendance Attended   Attendance Duration (min) 0-15   Interactions Interacted appropriately   Affect/Mood Appropriate   Goals Achieved Able to engage in interactions     Pt filled out the relapse prevention plan form with the assistance of this therapist  She had no questions once the form was completed

## 2022-06-28 NOTE — CASE MANAGEMENT
CM participated in a Zoom meeting at the request of Pt's therapist, Alexa Neri  Also present on the video meeting was Delmer Reeves from [address] from [facility]  [facility] reported that they felt that Pt should be referred to [facility] for further stabilization  They reported that she is spending more time inpatient than she is outpatient  They have tried several different strategies to help Pt feel safe in the community & try to divert her from calling 9-1-1, but she continues to be readmitted  CM reviewed typically EAC referrals involve individuals that have been functioning at a certain level, & then for whatever reason have decompensated & have not been able to get back to that previous level of functioning & would benefit from an extended inpatient stay  CM reviewed that this is Pt's baseline as this was her pattern of behavior prior to being moved from [place] [address] by [facility]  CM reviewed that Pt reports having CAH, however, during her multiple admissions, she has never appeared RIS or internally preoccupied  CM reviewed that this seems to be more behavioral, which an extended stay & further medication adjustments will not change  CM reviewed that Pt does present depressed at times, & she has her medications changed frequently with her multiple admissions, which is not helping her, however, she does not appeared to be an appropriate candidate for an EAC referral   Herman Jarquin asked about CM staring the [facility] referral & they could follow-up, however, CM reviewed that if the hospital is stating Pt is safe for discharge back to the community, there is no longer a need for that level of care (EAC)  Laureano Geller reiterated what CM said & that this is Pt's baseline & it may just be time to discuss a different level of residential care     CM suggested that if they truly feel she needs to go to [facility], when she saying she is suicidal to [facility] for [facility] & admission within her own county  Ezio Wynn said that the ambulance won't take her & CM agreed that they would take her to the nearest ER for evaluation  Ezio Wynn said that they took Pt once to [facility] & she was admitted, however, when they called to say they were going to bring her for an evaluation, they were told not do that their beds were full    CM & Juana Mratel reviewed the 5 CRCs in [address] [place] [address]:   [address] ([address]    [address] [place] [address], [facility]

## 2022-06-28 NOTE — NURSING NOTE
Pt declined to review AVS   Prescriptions e-prescribed  Pt discharged from unit via [facility] staff

## 2022-06-29 ENCOUNTER — HOSPITAL ENCOUNTER (EMERGENCY)
Facility: HOSPITAL | Age: 52
Discharge: HOME | End: 2022-06-30
Attending: EMERGENCY MEDICINE
Payer: MEDICARE

## 2022-06-29 ENCOUNTER — APPOINTMENT (EMERGENCY)
Dept: CT IMAGING | Facility: HOSPITAL | Age: 52
End: 2022-06-29
Payer: MEDICARE

## 2022-06-29 DIAGNOSIS — R10.9 ABDOMINAL PAIN: Primary | ICD-10-CM

## 2022-06-29 LAB
ALBUMIN SERPL BCP-MCNC: 3.4 G/DL (ref 3.5–5)
ALP SERPL-CCNC: 66 U/L (ref 46–116)
ALT SERPL W P-5'-P-CCNC: 24 U/L (ref 12–78)
ANION GAP SERPL CALCULATED.3IONS-SCNC: 4 MMOL/L (ref 4–13)
AST SERPL W P-5'-P-CCNC: 19 U/L (ref 5–45)
BACTERIA UR QL AUTO: NORMAL /HPF
BASOPHILS # BLD AUTO: 0.05 THOUSANDS/ΜL (ref 0–0.1)
BASOPHILS NFR BLD AUTO: 1 % (ref 0–1)
BILIRUB SERPL-MCNC: 0.5 MG/DL (ref 0.2–1)
BILIRUB UR QL STRIP: NEGATIVE
BUN SERPL-MCNC: 12 MG/DL (ref 5–25)
CALCIUM ALBUM COR SERPL-MCNC: 9.1 MG/DL (ref 8.3–10.1)
CALCIUM SERPL-MCNC: 8.6 MG/DL (ref 8.3–10.1)
CARDIAC TROPONIN I PNL SERPL HS: <2 NG/L
CHLORIDE SERPL-SCNC: 100 MMOL/L (ref 100–108)
CLARITY UR: CLEAR
CO2 SERPL-SCNC: 28 MMOL/L (ref 21–32)
COLOR UR: YELLOW
CREAT SERPL-MCNC: 0.56 MG/DL (ref 0.6–1.3)
EOSINOPHIL # BLD AUTO: 0.1 THOUSAND/ΜL (ref 0–0.61)
EOSINOPHIL NFR BLD AUTO: 1 % (ref 0–6)
ERYTHROCYTE [DISTWIDTH] IN BLOOD BY AUTOMATED COUNT: 14.4 % (ref 11.6–15.1)
EXT PREG TEST URINE: NEGATIVE
EXT. CONTROL ED NAV: NORMAL
GFR SERPL CREATININE-BSD FRML MDRD: 108 ML/MIN/1.73SQ M
GLUCOSE SERPL-MCNC: 135 MG/DL (ref 65–140)
GLUCOSE UR STRIP-MCNC: NEGATIVE MG/DL
HCT VFR BLD AUTO: 38.4 % (ref 34.8–46.1)
HGB BLD-MCNC: 12.4 G/DL (ref 11.5–15.4)
HGB UR QL STRIP.AUTO: NEGATIVE
HOLD SPECIMEN: NORMAL
IMM GRANULOCYTES # BLD AUTO: 0.16 THOUSAND/UL (ref 0–0.2)
IMM GRANULOCYTES NFR BLD AUTO: 2 % (ref 0–2)
KETONES UR STRIP-MCNC: NEGATIVE MG/DL
LEUKOCYTE ESTERASE UR QL STRIP: ABNORMAL
LYMPHOCYTES # BLD AUTO: 1.55 THOUSANDS/ΜL (ref 0.6–4.47)
LYMPHOCYTES NFR BLD AUTO: 19 % (ref 14–44)
MCH RBC QN AUTO: 31.9 PG (ref 26.8–34.3)
MCHC RBC AUTO-ENTMCNC: 32.3 G/DL (ref 31.4–37.4)
MCV RBC AUTO: 99 FL (ref 82–98)
MONOCYTES # BLD AUTO: 0.93 THOUSAND/ΜL (ref 0.17–1.22)
MONOCYTES NFR BLD AUTO: 12 % (ref 4–12)
NEUTROPHILS # BLD AUTO: 5.19 THOUSANDS/ΜL (ref 1.85–7.62)
NEUTS SEG NFR BLD AUTO: 65 % (ref 43–75)
NITRITE UR QL STRIP: NEGATIVE
NON-SQ EPI CELLS URNS QL MICRO: NORMAL /HPF
NRBC BLD AUTO-RTO: 0 /100 WBCS
PH UR STRIP.AUTO: 6 [PH]
PLATELET # BLD AUTO: 177 THOUSANDS/UL (ref 149–390)
PMV BLD AUTO: 9.9 FL (ref 8.9–12.7)
POTASSIUM SERPL-SCNC: 4.2 MMOL/L (ref 3.5–5.3)
PROT SERPL-MCNC: 7 G/DL (ref 6.4–8.2)
PROT UR STRIP-MCNC: NEGATIVE MG/DL
RBC # BLD AUTO: 3.89 MILLION/UL (ref 3.81–5.12)
RBC #/AREA URNS AUTO: NORMAL /HPF
SODIUM SERPL-SCNC: 132 MMOL/L (ref 136–145)
SP GR UR STRIP.AUTO: 1.01 (ref 1–1.03)
UROBILINOGEN UR QL STRIP.AUTO: 0.2 E.U./DL
WBC # BLD AUTO: 7.98 THOUSAND/UL (ref 4.31–10.16)
WBC #/AREA URNS AUTO: NORMAL /HPF

## 2022-06-29 PROCEDURE — 93005 ELECTROCARDIOGRAM TRACING: CPT

## 2022-06-29 PROCEDURE — 99285 EMERGENCY DEPT VISIT HI MDM: CPT

## 2022-06-29 PROCEDURE — 74176 CT ABD & PELVIS W/O CONTRAST: CPT

## 2022-06-29 PROCEDURE — 81001 URINALYSIS AUTO W/SCOPE: CPT | Performed by: EMERGENCY MEDICINE

## 2022-06-29 PROCEDURE — 83690 ASSAY OF LIPASE: CPT | Performed by: EMERGENCY MEDICINE

## 2022-06-29 PROCEDURE — 85025 COMPLETE CBC W/AUTO DIFF WBC: CPT | Performed by: EMERGENCY MEDICINE

## 2022-06-29 PROCEDURE — 99282 EMERGENCY DEPT VISIT SF MDM: CPT | Performed by: EMERGENCY MEDICINE

## 2022-06-29 PROCEDURE — 36415 COLL VENOUS BLD VENIPUNCTURE: CPT

## 2022-06-29 PROCEDURE — 80053 COMPREHEN METABOLIC PANEL: CPT | Performed by: EMERGENCY MEDICINE

## 2022-06-29 PROCEDURE — G1004 CDSM NDSC: HCPCS

## 2022-06-29 PROCEDURE — 81025 URINE PREGNANCY TEST: CPT | Performed by: EMERGENCY MEDICINE

## 2022-06-29 PROCEDURE — 84484 ASSAY OF TROPONIN QUANT: CPT

## 2022-06-30 VITALS
BODY MASS INDEX: 44.03 KG/M2 | RESPIRATION RATE: 18 BRPM | DIASTOLIC BLOOD PRESSURE: 64 MMHG | HEIGHT: 66 IN | WEIGHT: 274 LBS | TEMPERATURE: 98.2 F | OXYGEN SATURATION: 97 % | SYSTOLIC BLOOD PRESSURE: 118 MMHG | HEART RATE: 76 BPM

## 2022-06-30 LAB — LIPASE SERPL-CCNC: 89 U/L (ref 73–393)

## 2022-06-30 NOTE — ED NOTES
Patient transported to [address] [place] [address],[address], [facility]  06/29/22 0062

## 2022-06-30 NOTE — ED PROVIDER NOTES
History  Chief Complaint   Patient presents with    Abdominal Pain     Pt arrives with abd  Pain, +diarrhea 3-4 times today, denies blood in stool  Denies SI/HI  Pt lives at [facility]  52-year-old female presents for evaluation of epigastric abdominal pain that started earlier today  Patient reports multiple episodes of watery, nonbloody stools without nausea and vomiting  Patient denies chest pain, shortness of breath  Pain is worse with palpation  Denies having similar symptoms previously  No known sick contacts  Denies recent use of antibiotics or travel  Prior to Admission Medications   Prescriptions Last Dose Informant Patient Reported?  Taking?   aspirin 81 mg chewable tablet   No No   Sig: Chew 1 tablet (81 mg total) daily   atorvastatin (LIPITOR) 40 mg tablet   No No   Sig: Take 1 tablet (40 mg total) by mouth daily with dinner   divalproex sodium (DEPAKOTE ER) 500 mg 24 hr tablet   No No   Sig: Take 5 tablets (2,500 mg total) by mouth daily at bedtime   haloperidol (HALDOL) 10 mg tablet   No No   Sig: Take 1 tablet (10 mg total) by mouth 2 (two) times a day Take 1 tablet in the morning and 1 tablet before bedtime   haloperidol (HALDOL) 10 mg tablet   No No   Sig: Take 1 tablet (10 mg total) by mouth 2 (two) times a day   lisinopril (ZESTRIL) 20 mg tablet   No No   Sig: Take 1 tablet (20 mg total) by mouth daily   mirtazapine (REMERON) 30 mg tablet   No No   Sig: Take 1 tablet (30 mg total) by mouth daily at bedtime   mirtazapine (REMERON) 45 MG tablet   No No   Sig: Take 1 tablet (45 mg total) by mouth daily at bedtime   prazosin (MINIPRESS) 2 mg capsule   No No   Sig: Take 1 capsule (2 mg total) by mouth daily at bedtime   senna-docusate sodium (SENOKOT S) 8 6-50 mg per tablet   No No   Sig: Take 1 tablet by mouth 2 (two) times a day      Facility-Administered Medications: None       Past Medical History:   Diagnosis Date    Anxiety     Bipolar [facility] disorder ([facility] )     [facility], depressed[facility] 10/10/2021    Borderline personality disorder ([facility] )     CAD (coronary artery disease)     Cognitive impairment     Depression     Dyslipidemia     GERD (gastroesophageal reflux disease)     Hypertension     Obesity     Psychiatric disorder     Psychiatric illness     PTSD (post-traumatic stress disorder)     Schizoaffective disorder (HCC)     Seizure ([facility] )        Past Surgical History:   Procedure Laterality Date    APPENDECTOMY      PATIENT DENIES HAVING APPENDIX REMOVED    CHOLECYSTECTOMY      FOOT SURGERY Right        Family History   Problem Relation Age of Onset    Kidney cancer Mother     Cerebral aneurysm Father      I have reviewed and agree with the history as documented  E-Cigarette/Vaping    E-Cigarette Use Never User      E-Cigarette/Vaping Substances    Nicotine No     THC No     CBD No     Flavoring No     Other No     Unknown No      Social History     Tobacco Use    Smoking status: Never Smoker    Smokeless tobacco: Never Used    Tobacco comment: Pt stated "smokes when stressed"   Vaping Use    Vaping Use: Never used   Substance Use Topics    Alcohol use: Not Currently    Drug use: Not Currently       Review of Systems   Constitutional: Negative for fever  Gastrointestinal: Positive for abdominal pain  All other systems reviewed and are negative  Physical Exam  Physical Exam  Vitals and nursing note reviewed  Constitutional:       Appearance: She is well-developed  HENT:      Head: Normocephalic and atraumatic  Right Ear: External ear normal       Left Ear: External ear normal       Nose: Nose normal    Eyes:      General: No scleral icterus  Cardiovascular:      Rate and Rhythm: Normal rate  Pulmonary:      Effort: Pulmonary effort is normal  No respiratory distress  Abdominal:      General: There is no distension  Tenderness:  There is abdominal tenderness in the epigastric area and periumbilical area  Musculoskeletal:         General: No deformity  Normal range of motion  Cervical back: Normal range of motion  Skin:     Findings: No rash  Neurological:      General: No focal deficit present  Mental Status: She is alert and oriented to person, place, and time     Psychiatric:         Mood and Affect: Mood normal          Vital Signs  ED Triage Vitals   Temperature Pulse Respirations Blood Pressure SpO2   06/29/22 1814 06/29/22 1814 06/29/22 1814 06/29/22 1839 06/29/22 1814   98 2 °F (36 8 °C) 89 18 143/71 99 %      Temp Source Heart Rate Source Patient Position - Orthostatic VS BP Location FiO2 (%)   06/29/22 1814 06/29/22 1814 06/29/22 1814 06/29/22 1814 --   Temporal Monitor Sitting Left arm       Pain Score       06/29/22 1814       10 - Worst Possible Pain           Vitals:    06/29/22 1814 06/29/22 1839 06/29/22 2204 06/29/22 2345   BP:  143/71 115/61 118/64   Pulse: 89  82 76   Patient Position - Orthostatic VS: Sitting            Visual Acuity      ED Medications  Medications - No data to display    Diagnostic Studies  Results Reviewed     Procedure Component Value Units Date/Time    HS Troponin I 2hr [[id number]]     Lab Status: No result Specimen: Blood     HS Troponin I 4hr [[id number]]     Lab Status: No result Specimen: Blood     HS Troponin 0hr (reflex protocol) [[id number]]  (Normal) Collected: 06/29/22 1842    Lab Status: Final result Specimen: Blood from Arm, Left Updated: 06/29/22 2255     hs TnI 0hr <2 ng/L     Urine Microscopic [[id number]]  (Normal) Collected: 06/29/22 2202    Lab Status: Final result Specimen: Urine, Clean Catch Updated: 06/29/22 2234     RBC, UA 0-1 /hpf      WBC, UA 2-4 /hpf      Epithelial Cells Occasional /hpf      Bacteria, UA Occasional /hpf     UA w Reflex to Microscopic w Reflex to Culture [[id number]]  (Abnormal) Collected: 06/29/22 2202    Lab Status: Final result Specimen: Urine, Clean Catch Updated: 06/29/22 2223     Color, UA Yellow Clarity, UA Clear     Specific Gravity, UA 1 010     pH, UA 6 0     Leukocytes, UA Small     Nitrite, UA Negative     Protein, UA Negative mg/dl      Glucose, UA Negative mg/dl      Ketones, UA Negative mg/dl      Urobilinogen, UA 0 2 E U /dl      Bilirubin, UA Negative     Occult Blood, UA Negative    POCT pregnancy, urine [[id number]]  (Normal) Resulted: 06/29/22 2202    Lab Status: Final result Updated: 06/29/22 2202     EXT PREG TEST UR (Ref: Negative) Negative     Control Valid    Trauma tubes on hold [[id number]] Collected: 06/29/22 1842    Lab Status: Final result Specimen: Blood from Arm, Left Updated: 06/29/22 2138    Narrative: The following orders were created for panel order Trauma tubes on hold  Procedure                               Abnormality         Status                     ---------                               -----------         ------                     Xochitl Boom Top on XTNJ[[id number]]                           Final result               Gold top on OEAW[[id number]]                                                            Green / Black tube on JQWZ[[id number]]                       Final result                 Please view results for these tests on the individual orders      Comprehensive metabolic panel [[id number]]  (Abnormal) Collected: 06/29/22 1842    Lab Status: Final result Specimen: Blood from Arm, Left Updated: 06/29/22 1935     Sodium 132 mmol/L      Potassium 4 2 mmol/L      Chloride 100 mmol/L      CO2 28 mmol/L      ANION GAP 4 mmol/L      BUN 12 mg/dL      Creatinine 0 56 mg/dL      Glucose 135 mg/dL      Calcium 8 6 mg/dL      Corrected Calcium 9 1 mg/dL      AST 19 U/L      ALT 24 U/L      Alkaline Phosphatase 66 U/L      Total Protein 7 0 g/dL      Albumin 3 4 g/dL      Total Bilirubin 0 50 mg/dL      eGFR 108 ml/min/1 73sq m     Narrative:      Cammie guidelines for Chronic Kidney Disease (CKD):     Stage 1 with normal or high GFR (GFR > 90 mL/min/1 73 square meters)    Stage 2 Mild CKD (GFR = 60-89 mL/min/1 73 square meters)    Stage 3A Moderate CKD (GFR = 45-59 mL/min/1 73 square meters)    Stage 3B Moderate CKD (GFR = 30-44 mL/min/1 73 square meters)    Stage 4 Severe CKD (GFR = 15-29 mL/min/1 73 square meters)    Stage 5 End Stage CKD (GFR <15 mL/min/1 73 square meters)  Note: GFR calculation is accurate only with a steady state creatinine    CBC and differential [[id number]]  (Abnormal) Collected: 06/29/22 1842    Lab Status: Final result Specimen: Blood from Arm, Left Updated: 06/29/22 1907     WBC 7 98 Thousand/uL      RBC 3 89 Million/uL      Hemoglobin 12 4 g/dL      Hematocrit 38 4 %      MCV 99 fL      MCH 31 9 pg      MCHC 32 3 g/dL      RDW 14 4 %      MPV 9 9 fL      Platelets 967 Thousands/uL      nRBC 0 /100 WBCs      Neutrophils Relative 65 %      Immat GRANS % 2 %      Lymphocytes Relative 19 %      Monocytes Relative 12 %      Eosinophils Relative 1 %      Basophils Relative 1 %      Neutrophils Absolute 5 19 Thousands/µL      Immature Grans Absolute 0 16 Thousand/uL      Lymphocytes Absolute 1 55 Thousands/µL      Monocytes Absolute 0 93 Thousand/µL      Eosinophils Absolute 0 10 Thousand/µL      Basophils Absolute 0 05 Thousands/µL     Lipase [[id number]] Collected: 06/29/22 1842    Lab Status: In process Specimen: Blood from Arm, Left Updated: 06/29/22 1853                 CT abdomen pelvis wo contrast   Final Result by Sukh Guidry MD (06/29 4599)      No acute inflammatory process identified within the abdomen or pelvis  Workstation performed: [id number]                    Procedures  Procedures         ED Course                               SBIRT 22yo+    Flowsheet Row Most Recent Value   SBIRT (25 yo +)    In order to provide better care to our patients, we are screening all of our patients for alcohol and drug use  Would it be okay to ask you these screening questions?  Yes Filed at: 06/29/2022 1842   Initial Alcohol Screen: US AUDIT-C     1  How often do you have a drink containing alcohol? 0 Filed at: 06/29/2022 1842   2  How many drinks containing alcohol do you have on a typical day you are drinking? 0 Filed at: 06/29/2022 1842   3a  Male UNDER 65: How often do you have five or more drinks on one occasion? 0 Filed at: 06/29/2022 1842   3b  FEMALE Any Age, or MALE 65+: How often do you have 4 or more drinks on one occassion? 0 Filed at: 06/29/2022 1842   Audit-C Score 0 Filed at: 06/29/2022 1842   DAT: How many times in the past year have you    Used an illegal drug or used a prescription medication for non-medical reasons? Never Filed at: 06/29/2022 1842                    MDM  Number of Diagnoses or Management Options  Abdominal pain: new and requires workup  Diagnosis management comments: 47 yof abd pain  Labs, ekg, CTAP  Symptom control  Reassess  Amount and/or Complexity of Data Reviewed  Clinical lab tests: reviewed and ordered  Tests in the radiology section of CPT®: reviewed and ordered  Tests in the medicine section of CPT®: ordered and reviewed  Decide to obtain previous medical records or to obtain history from someone other than the patient: yes  Review and summarize past medical records: yes        Disposition  Final diagnoses:   Abdominal pain     Time reflects when diagnosis was documented in both MDM as applicable and the Disposition within this note     Time User Action Codes Description Comment    6/30/2022 12:00 AM Red Kussmaul Add [R10 9] Abdominal pain       ED Disposition     ED Disposition   Discharge    Condition   Stable    Date/Time   Thu Jun 30, 2022 12:00 AM    Comment   Leydi Khan discharge to home/self care                 Follow-up Information     Follow up With Specialties Details Why Contact Info Additional Information    Justin Kendrick MD Internal Medicine   [address] [facility],Third Floor  [facility] [id number] [address] [address] Emergency Department Emergency Medicine  If symptoms worsen [address] [place],[address] Emergency Department, [address] [place], [facility], [address]          Discharge Medication List as of 6/30/2022 12:02 AM      CONTINUE these medications which have NOT CHANGED    Details   aspirin 81 mg chewable tablet Chew 1 tablet (81 mg total) daily, Starting Mon 6/27/2022, Until Wed 7/27/2022, Normal      atorvastatin (LIPITOR) 40 mg tablet Take 1 tablet (40 mg total) by mouth daily with dinner, Starting Mon 6/27/2022, Until Wed 7/27/2022, Normal      divalproex sodium (DEPAKOTE ER) 500 mg 24 hr tablet Take 5 tablets (2,500 mg total) by mouth daily at bedtime, Starting Mon 6/27/2022, Until Wed 7/27/2022, Normal      !! haloperidol (HALDOL) 10 mg tablet Take 1 tablet (10 mg total) by mouth 2 (two) times a day Take 1 tablet in the morning and 1 tablet before bedtime, Starting Mon 6/27/2022, Until Wed 7/27/2022, Normal      !! haloperidol (HALDOL) 10 mg tablet Take 1 tablet (10 mg total) by mouth 2 (two) times a day, Starting Mon 6/27/2022, Until Fri 8/26/2022, Normal      lisinopril (ZESTRIL) 20 mg tablet Take 1 tablet (20 mg total) by mouth daily, Starting Mon 6/27/2022, Until Wed 7/27/2022, Normal      !! mirtazapine (REMERON) 30 mg tablet Take 1 tablet (30 mg total) by mouth daily at bedtime, Starting Mon 6/27/2022, Until Wed 7/27/2022, Normal      !! mirtazapine (REMERON) 45 MG tablet Take 1 tablet (45 mg total) by mouth daily at bedtime, Starting Mon 6/27/2022, Until Fri 8/26/2022, Normal      prazosin (MINIPRESS) 2 mg capsule Take 1 capsule (2 mg total) by mouth daily at bedtime, Starting Mon 6/27/2022, Until Wed 7/27/2022, Normal      senna-docusate sodium (SENOKOT S) 8 6-50 mg per tablet Take 1 tablet by mouth 2 (two) times a day, Starting Mon 6/27/2022, Until Wed 7/27/2022, Normal       !! - Potential duplicate medications found  Please discuss with provider  No discharge procedures on file      PDMP Review       Value Time User    PDMP Reviewed  Yes 5/6/2022 10:38 AM Gail Junior MD          ED Provider  Electronically Signed by           Rola Sales DO  06/30/22 0032

## 2022-06-30 NOTE — PROGRESS NOTES
Status: Pt denies any SI/HI  She refused labs this morning  She slept overnight  Medication: no changes / no PRNs  D/C: Today - CM is waiting to hear back from residence regarding transportation time  / Pt's outpatient providers requesting a meeting at 11 AM today to discuss EAC referral, however, Pt is not appropriate       06/28/22 0750   Team Meeting   Meeting Type Daily Rounds   Team Members Present   Team Members Present Physician;Nurse; Other (Discipline and Name);    Physician Team Member Dr Sanford schwartz / Macy Jara / Kwadwo Hayden Team Member Margoth Beck / Melecio Laureano Management Team Member Eliz Romeo / Sophia Rodriguez   Other (Discipline and Name) Matilda Thomas (art therapy)   Patient/Family Present   Patient Present No   Patient's Family Present No

## 2022-06-30 NOTE — ED NOTES
Patient ambulated to the bathroom to provide a urine sample        Marilyn Goodpasture, RN  06/29/22 3531

## 2022-06-30 NOTE — CASE MANAGEMENT
CM met briefly with Pt upon her arrival to the unit for her to sign ROIs for her residence & outpatient provider, both through [facility]  Pt reported feeling suicidal & needed to come back to the hospital     CM received a phone call from Pt's care coordinator at [facility], [facility], who reviewed that they had tried to divert Pt's admission  She was asking how quickly Pt could be discharged, & CM reviewed that it would mostly likely be Tuesday at the earliest   CM agreed to provide an update on Monday

## 2022-06-30 NOTE — CASE MANAGEMENT
CM met with Pt who verbalized readiness & understanding of d/c  Pt & CM reviewed & signed her IMM  CM asked Pt where she wants to be, as there was going to be a meeting to discuss this  CM reviewed that this is Pt's 17th admission since January 2021, & they are questioning where she wants to live, because she is spending more time in the hospital than in the community  CM said that they would discuss ECT, & Pt quickly said that she did not want that  CM said that they also mentioned EAC which would be at [facility] or [address] [place] [address] said that she was at [address] [place], [place] [address]d would like to go there again, however, Pt not able to say why she feels she needs to be at an [facility] unit  Pt had no questions about her d/c plans

## 2022-07-01 LAB
ATRIAL RATE: 81 BPM
P AXIS: 45 DEGREES
PR INTERVAL: 214 MS
QRS AXIS: 57 DEGREES
QRSD INTERVAL: 78 MS
QT INTERVAL: 354 MS
QTC INTERVAL: 411 MS
T WAVE AXIS: 59 DEGREES
VENTRICULAR RATE: 81 BPM

## 2022-07-01 PROCEDURE — 93010 ELECTROCARDIOGRAM REPORT: CPT | Performed by: INTERNAL MEDICINE

## 2022-07-13 ENCOUNTER — OFFICE VISIT (OUTPATIENT)
Dept: URGENT CARE | Facility: CLINIC | Age: 52
End: 2022-07-13
Payer: MEDICARE

## 2022-07-13 VITALS
TEMPERATURE: 99.4 F | OXYGEN SATURATION: 96 % | HEART RATE: 92 BPM | SYSTOLIC BLOOD PRESSURE: 186 MMHG | DIASTOLIC BLOOD PRESSURE: 93 MMHG | RESPIRATION RATE: 16 BRPM

## 2022-07-13 DIAGNOSIS — J06.9 ACUTE URI: Primary | ICD-10-CM

## 2022-07-13 PROCEDURE — G0463 HOSPITAL OUTPT CLINIC VISIT: HCPCS | Performed by: PHYSICIAN ASSISTANT

## 2022-07-13 PROCEDURE — 99213 OFFICE O/P EST LOW 20 MIN: CPT | Performed by: PHYSICIAN ASSISTANT

## 2022-07-13 RX ORDER — HALOPERIDOL 5 MG/1
TABLET ORAL
COMMUNITY
Start: 2022-06-07 | End: 2022-07-27

## 2022-07-13 RX ORDER — LANOLIN ALCOHOL/MO/W.PET/CERES
CREAM (GRAM) TOPICAL
COMMUNITY
Start: 2022-07-05 | End: 2022-08-03

## 2022-07-13 RX ORDER — LEVOCARNITINE 330 MG/1
330 TABLET ORAL DAILY
COMMUNITY
Start: 2022-03-22 | End: 2022-07-27

## 2022-07-13 RX ORDER — ZIPRASIDONE HYDROCHLORIDE 40 MG/1
CAPSULE ORAL
COMMUNITY
Start: 2022-07-05 | End: 2022-08-03

## 2022-07-13 RX ORDER — LEVOCARNITINE 330 MG/1
TABLET ORAL
COMMUNITY
Start: 2022-05-06 | End: 2022-08-03

## 2022-07-13 RX ORDER — RESVER/WINE/BFL/GRPSD/PC/C/POM 200MG-60MG
CAPSULE ORAL
COMMUNITY
Start: 2022-06-07 | End: 2022-08-03

## 2022-07-13 NOTE — PROGRESS NOTES
NAME: Dayton Haynes is a 47 y o  female  : 1970    MRN: [id number]      Assessment and Plan   Acute URI [J06 9]  1  Acute URI  dextromethorphan-guaifenesin (MUCINEX DM)  MG per 12 hr tablet      discussed likely viral   Over-the-counter medications, fluids and rest   If no improvement in next 4-5 days follow-up with PCP  They acknowledge    Patient Instructions     Patient Instructions   PRN pain relievers as directed such as tylenol       Proceed to ER if symptoms worsen  Chief Complaint     Chief Complaint   Patient presents with    Sore Throat     Pt reports worsening sore throat for 2 days as well as cough  No fever at home, no chills, no congestion  Pt reports she was tested earlier today for COVID and was negative  History of Present Illness   Patient with history of hypertension, CAD and psychiatric disorders presents with staff from [facility] complaining of sore throat congestion x2 days  Reports she has cough, head congestion and a sore throat which hurts with swallowing  Is able to eat and drink still  Denies any fevers or chills or body aches  No chest pain or trouble breathing  No nausea, vomiting or diarrhea  Has not been taking anything over-the-counter COVID test prior to arrival was negative      Review of Systems   Review of Systems   Constitutional: Negative for chills and fever  HENT: Positive for congestion, rhinorrhea, sinus pressure and sore throat  Respiratory: Positive for cough  Negative for chest tightness, shortness of breath, wheezing and stridor  Cardiovascular: Negative for chest pain and palpitations  Gastrointestinal: Negative for diarrhea, nausea and vomiting  Musculoskeletal: Negative for myalgias  Neurological: Negative for dizziness, weakness and numbness           Current Medications       Current Outpatient Medications:     dextromethorphan-guaifenesin (MUCINEX DM)  MG per 12 hr tablet, Take 1 tablet by mouth every 12 (twelve) hours, Disp: 10 tablet, Rfl: 0    levOCARNitine (CARNITOR) 330 MG tablet, Take 330 mg by mouth daily, Disp: , Rfl:     aspirin 81 mg chewable tablet, Chew 1 tablet (81 mg total) daily, Disp: 30 tablet, Rfl: 0    atorvastatin (LIPITOR) 40 mg tablet, Take 1 tablet (40 mg total) by mouth daily with dinner, Disp: 30 tablet, Rfl: 0    D-1000 Extra Strength 25 MCG (1000 UT) tablet, , Disp: , Rfl:     divalproex sodium (DEPAKOTE ER) 500 mg 24 hr tablet, Take 5 tablets (2,500 mg total) by mouth daily at bedtime, Disp: 150 tablet, Rfl: 0    haloperidol (HALDOL) 10 mg tablet, Take 1 tablet (10 mg total) by mouth 2 (two) times a day Take 1 tablet in the morning and 1 tablet before bedtime, Disp: 60 tablet, Rfl: 0    haloperidol (HALDOL) 10 mg tablet, Take 1 tablet (10 mg total) by mouth 2 (two) times a day, Disp: 60 tablet, Rfl: 1    haloperidol (HALDOL) 5 mg tablet, , Disp: , Rfl:     levOCARNitine (CARNITOR) 330 MG tablet, , Disp: , Rfl:     lisinopril (ZESTRIL) 20 mg tablet, Take 1 tablet (20 mg total) by mouth daily, Disp: 30 tablet, Rfl: 0    melatonin 3 mg, , Disp: , Rfl:     mirtazapine (REMERON) 30 mg tablet, Take 1 tablet (30 mg total) by mouth daily at bedtime, Disp: 30 tablet, Rfl: 0    mirtazapine (REMERON) 45 MG tablet, Take 1 tablet (45 mg total) by mouth daily at bedtime, Disp: 30 tablet, Rfl: 1    prazosin (MINIPRESS) 2 mg capsule, Take 1 capsule (2 mg total) by mouth daily at bedtime, Disp: 30 capsule, Rfl: 0    senna-docusate sodium (SENOKOT S) 8 6-50 mg per tablet, Take 1 tablet by mouth 2 (two) times a day, Disp: 60 tablet, Rfl: 0    ziprasidone (GEODON) 40 mg capsule, , Disp: , Rfl:     Current Allergies     Allergies as of 07/13/2022 - Reviewed 07/13/2022   Allergen Reaction Noted    Fish oil - food allergy Other (See Comments) 11/29/2020    Fish-derived products - food allergy Hives 10/16/2020              Past Medical History:   Diagnosis Date    Anxiety     [facility] )  Bipolar affective disorder, depressed, severe ([facility] ) 10/10/2021    Borderline personality disorder ([facility] )     CAD (coronary artery disease)     Cognitive impairment     Depression     Dyslipidemia     GERD (gastroesophageal reflux disease)     Hypertension     Obesity     Psychiatric disorder     Psychiatric illness     PTSD (post-traumatic stress disorder)     Schizoaffective disorder (HCC)     [facility]        Past Surgical History:   Procedure Laterality Date    APPENDECTOMY      PATIENT DENIES HAVING APPENDIX REMOVED    CHOLECYSTECTOMY      FOOT SURGERY Right        Family History   Problem Relation Age of Onset    Kidney cancer Mother     Cerebral aneurysm Father          Medications have been verified  The following portions of the patient's history were reviewed and updated as appropriate: allergies, current medications, past family history, past medical history, past social history, past surgical history and problem list     Objective   BP (!) 186/93   Pulse 92   Temp 99 4 °F (37 4 °C)   Resp 16   SpO2 96%      Physical Exam     Physical Exam  Vitals and nursing note reviewed  Constitutional:       General: She is not in acute distress  Appearance: She is well-developed  She is not ill-appearing, toxic-appearing or diaphoretic  HENT:      Head:      Comments: TMs clear bilaterally without erythema or bulging  Posterior oropharynx is mildly erythematous without tonsillar hypertrophy or exudates  Uvula midline  Soft palate equal   Cardiovascular:      Rate and Rhythm: Normal rate and regular rhythm  Heart sounds: Normal heart sounds  Pulmonary:      Effort: Pulmonary effort is normal  No respiratory distress  Breath sounds: Normal breath sounds  No stridor  No wheezing, rhonchi or rales  Musculoskeletal:      Cervical back: Normal range of motion  Lymphadenopathy:      Cervical: No cervical adenopathy  Skin:     General: Skin is warm        Capillary Refill: Capillary refill takes less than 2 seconds  Neurological:      Mental Status: She is alert and oriented to person, place, and time

## 2022-07-20 ENCOUNTER — HOSPITAL ENCOUNTER (EMERGENCY)
Facility: HOSPITAL | Age: 52
Discharge: HOME | End: 2022-07-20
Attending: EMERGENCY MEDICINE
Payer: MEDICARE

## 2022-07-20 VITALS
WEIGHT: 274 LBS | RESPIRATION RATE: 16 BRPM | DIASTOLIC BLOOD PRESSURE: 69 MMHG | SYSTOLIC BLOOD PRESSURE: 112 MMHG | TEMPERATURE: 98.2 F | HEART RATE: 94 BPM | OXYGEN SATURATION: 94 % | BODY MASS INDEX: 45.65 KG/M2 | HEIGHT: 65 IN

## 2022-07-20 DIAGNOSIS — F32.A DEPRESSION: ICD-10-CM

## 2022-07-20 DIAGNOSIS — R45.851 SUICIDAL IDEATIONS: Primary | ICD-10-CM

## 2022-07-20 LAB
ALBUMIN SERPL BCP-MCNC: 3.5 G/DL (ref 3.5–5)
ALP SERPL-CCNC: 72 U/L (ref 46–116)
ALT SERPL W P-5'-P-CCNC: 30 U/L (ref 12–78)
ANION GAP SERPL CALCULATED.3IONS-SCNC: 9 MMOL/L (ref 4–13)
AST SERPL W P-5'-P-CCNC: 24 U/L (ref 5–45)
BASOPHILS # BLD MANUAL: 0 THOUSAND/UL (ref 0–0.1)
BASOPHILS NFR MAR MANUAL: 0 % (ref 0–1)
BILIRUB SERPL-MCNC: 0.4 MG/DL (ref 0.2–1)
BUN SERPL-MCNC: 10 MG/DL (ref 5–25)
CALCIUM SERPL-MCNC: 9.2 MG/DL (ref 8.3–10.1)
CHLORIDE SERPL-SCNC: 98 MMOL/L (ref 96–108)
CO2 SERPL-SCNC: 27 MMOL/L (ref 21–32)
CREAT SERPL-MCNC: 0.78 MG/DL (ref 0.6–1.3)
EOSINOPHIL # BLD MANUAL: 0.34 THOUSAND/UL (ref 0–0.4)
EOSINOPHIL NFR BLD MANUAL: 3 % (ref 0–6)
ERYTHROCYTE [DISTWIDTH] IN BLOOD BY AUTOMATED COUNT: 14 % (ref 11.6–15.1)
ETHANOL EXG-MCNC: 0 MG/DL
FLUAV RNA RESP QL NAA+PROBE: NEGATIVE
FLUBV RNA RESP QL NAA+PROBE: NEGATIVE
GFR SERPL CREATININE-BSD FRML MDRD: 88 ML/MIN/1.73SQ M
GLUCOSE SERPL-MCNC: 99 MG/DL (ref 65–140)
HCT VFR BLD AUTO: 38.7 % (ref 34.8–46.1)
HGB BLD-MCNC: 12.8 G/DL (ref 11.5–15.4)
LYMPHOCYTES # BLD AUTO: 27 % (ref 14–44)
LYMPHOCYTES # BLD AUTO: 3.03 THOUSAND/UL (ref 0.6–4.47)
MCH RBC QN AUTO: 31.8 PG (ref 26.8–34.3)
MCHC RBC AUTO-ENTMCNC: 33.1 G/DL (ref 31.4–37.4)
MCV RBC AUTO: 96 FL (ref 82–98)
METAMYELOCYTES NFR BLD MANUAL: 1 % (ref 0–1)
MONOCYTES # BLD AUTO: 0.67 THOUSAND/UL (ref 0–1.22)
MONOCYTES NFR BLD: 6 % (ref 4–12)
MYELOCYTES NFR BLD MANUAL: 2 % (ref 0–1)
NEUTROPHILS # BLD MANUAL: 6.85 THOUSAND/UL (ref 1.85–7.62)
NEUTS BAND NFR BLD MANUAL: 1 % (ref 0–8)
NEUTS SEG NFR BLD AUTO: 60 % (ref 43–75)
PLATELET # BLD AUTO: 203 THOUSANDS/UL (ref 149–390)
PLATELET BLD QL SMEAR: ADEQUATE
PMV BLD AUTO: 9.2 FL (ref 8.9–12.7)
POTASSIUM SERPL-SCNC: 4.4 MMOL/L (ref 3.5–5.3)
PROT SERPL-MCNC: 7.7 G/DL (ref 6.4–8.4)
RBC # BLD AUTO: 4.02 MILLION/UL (ref 3.81–5.12)
RSV RNA RESP QL NAA+PROBE: NEGATIVE
SARS-COV-2 RNA RESP QL NAA+PROBE: NEGATIVE
SODIUM SERPL-SCNC: 134 MMOL/L (ref 135–147)
TSH SERPL DL<=0.05 MIU/L-ACNC: 2.04 UIU/ML (ref 0.45–4.5)
WBC # BLD AUTO: 11.23 THOUSAND/UL (ref 4.31–10.16)

## 2022-07-20 PROCEDURE — 0241U HB NFCT DS VIR RESP RNA 4 TRGT: CPT | Performed by: EMERGENCY MEDICINE

## 2022-07-20 PROCEDURE — 93005 ELECTROCARDIOGRAM TRACING: CPT

## 2022-07-20 PROCEDURE — 36415 COLL VENOUS BLD VENIPUNCTURE: CPT | Performed by: EMERGENCY MEDICINE

## 2022-07-20 PROCEDURE — 85007 BL SMEAR W/DIFF WBC COUNT: CPT | Performed by: EMERGENCY MEDICINE

## 2022-07-20 PROCEDURE — 84443 ASSAY THYROID STIM HORMONE: CPT | Performed by: EMERGENCY MEDICINE

## 2022-07-20 PROCEDURE — 80053 COMPREHEN METABOLIC PANEL: CPT | Performed by: EMERGENCY MEDICINE

## 2022-07-20 PROCEDURE — 99284 EMERGENCY DEPT VISIT MOD MDM: CPT

## 2022-07-20 PROCEDURE — 99285 EMERGENCY DEPT VISIT HI MDM: CPT | Performed by: EMERGENCY MEDICINE

## 2022-07-20 PROCEDURE — 82075 ASSAY OF BREATH ETHANOL: CPT | Performed by: EMERGENCY MEDICINE

## 2022-07-20 PROCEDURE — 85027 COMPLETE CBC AUTOMATED: CPT | Performed by: EMERGENCY MEDICINE

## 2022-07-20 NOTE — ED NOTES
Crisis met with pt to complete Crisis Intake and Safety Risk Assessment  Introduce self, role, and evaluation process  Pt presents in ED via ambulance from [facility], [address]  She stated she called 911 because she has been having suicidal thoughts, and cut her left arm with a plastic knife  She also stated she hears voices telling her she is ugly, and fat  Pt has a history of significant psychiatric disorders  She is diagnosed with Major Depressive Disorder, Bipolar Disorder, and Mood Disorder  She has prior suicide attempts and prior in patient psychiatric admission  Pt states she is compliant with taking her medication, with the most recent med intake this morning  Pt denies homicidal ideation  Collateral information obtained from  ROXANNE Geronimo   at [facility] ([phone], who stated that pt left the facility during shift change and called 911  Pt has a chronic history of inpatient admission  It is an avoidant behavior to process her current emotions therapeutically  The group home is in a remote area which is not near a high traffic area  Staff is willing to lock up plastic wear and put her on 15 minute checks should she be discharged for an extra safety precaution, and have her follow up with [facility] Psychiatrist tomorrow   Staff is not recommending IP treatment at this time for the patient and feel as if patient would be safe to return home should that be the recommendation  "

## 2022-07-20 NOTE — ED NOTES
This writer discussed the patients current presentation and recommended discharge plan with Dr Daley  He agrees with the patient being discharged at this time with referrals and/or information about mobile crisis support line  The patient was Instructed to follow up with their pcp, and psychiatrist    The patient was provided with referral information for:   [address] telephone number  This writer and the patient completed a safety plan  Pt agreed to contract to safety, and agrees to call mobile crisis support before calling 911  In addition, the patient was instructed to call local [facility] crisis, other crisis services, 911 or to go to the nearest ER immediately if their situation changes at any time  This writer discussed discharge plans with the patient, and [facility] ROXANNE Marion  who agrees with and understands the discharge plans

## 2022-07-20 NOTE — ED NOTES
Called austen bell @ [phone]  Pressed option #1 thrice and was sent on an automated loop that returned me to initial main menu  Pressed option #3 and left message for         Marita Carver RN  07/20/22 8669

## 2022-07-21 LAB
ATRIAL RATE: 102 BPM
P AXIS: 34 DEGREES
PR INTERVAL: 180 MS
QRS AXIS: 15 DEGREES
QRSD INTERVAL: 84 MS
QT INTERVAL: 334 MS
QTC INTERVAL: 435 MS
T WAVE AXIS: 62 DEGREES
VENTRICULAR RATE: 102 BPM

## 2022-07-21 PROCEDURE — 93010 ELECTROCARDIOGRAM REPORT: CPT | Performed by: INTERNAL MEDICINE

## 2022-07-21 NOTE — ED PROVIDER NOTES
History  Chief Complaint   Patient presents with    Psychiatric Evaluation     Patient presents to ED via EMS for psych eval, per EMS patient walked out of facility to call 911 to be seen for suicidal thoughts  61-year-old female presents for evaluation of vague suicidal ideations  Patient states she cut herself with a plastic knife on her arm yesterday in an attempt to hurt herself but not necessarily kill herself  Patient has a longstanding history of suicidal thoughts with an action plan in place  Patient's staff members called stating they would prefer not to have her admitted and they will do frequent checks on her and she also has an appointment with her psychiatrist this week  Patient initially wanted to sign herself in voluntarily but is also okay with being discharged  Prior to Admission Medications   Prescriptions Last Dose Informant Patient Reported? Taking?    D-1000 Extra Strength 25 MCG (1000 UT) tablet   Yes No   aspirin 81 mg chewable tablet   No No   Sig: Chew 1 tablet (81 mg total) daily   atorvastatin (LIPITOR) 40 mg tablet   No No   Sig: Take 1 tablet (40 mg total) by mouth daily with dinner   dextromethorphan-guaifenesin (MUCINEX DM)  MG per 12 hr tablet   No No   Sig: Take 1 tablet by mouth every 12 (twelve) hours   divalproex sodium (DEPAKOTE ER) 500 mg 24 hr tablet   No No   Sig: Take 5 tablets (2,500 mg total) by mouth daily at bedtime   haloperidol (HALDOL) 10 mg tablet   No No   Sig: Take 1 tablet (10 mg total) by mouth 2 (two) times a day Take 1 tablet in the morning and 1 tablet before bedtime   haloperidol (HALDOL) 10 mg tablet   No No   Sig: Take 1 tablet (10 mg total) by mouth 2 (two) times a day   haloperidol (HALDOL) 5 mg tablet   Yes No   levOCARNitine (CARNITOR) 330 MG tablet   Yes No   Sig: Take 330 mg by mouth daily   levOCARNitine (CARNITOR) 330 MG tablet   Yes No   lisinopril (ZESTRIL) 20 mg tablet   No No   Sig: Take 1 tablet (20 mg total) by mouth daily   melatonin 3 mg   Yes No   mirtazapine (REMERON) 30 mg tablet   No No   Sig: Take 1 tablet (30 mg total) by mouth daily at bedtime   mirtazapine (REMERON) 45 MG tablet   No No   Sig: Take 1 tablet (45 mg total) by mouth daily at bedtime   prazosin (MINIPRESS) 2 mg capsule   No No   Sig: Take 1 capsule (2 mg total) by mouth daily at bedtime   senna-docusate sodium (SENOKOT S) 8 6-50 mg per tablet   No No   Sig: Take 1 tablet by mouth 2 (two) times a day   ziprasidone (GEODON) 40 mg capsule   Yes No      Facility-Administered Medications: None       Past Medical History:   Diagnosis Date    Anxiety     Bipolar 1 disorder ([facility])     [facility], depressed[facility] ) 10/10/2021    Borderline personality disorder ([facility] )     CAD (coronary artery disease)     Cognitive impairment     Depression     Dyslipidemia     GERD (gastroesophageal reflux disease)     Hypertension     Obesity     Psychiatric disorder     Psychiatric illness     PTSD (post-traumatic stress disorder)     Schizoaffective disorder (HCC)     Seizure ([facility] )        Past Surgical History:   Procedure Laterality Date    APPENDECTOMY      PATIENT DENIES HAVING APPENDIX REMOVED    CHOLECYSTECTOMY      FOOT SURGERY Right        Family History   Problem Relation Age of Onset    Kidney cancer Mother     Cerebral aneurysm Father      I have reviewed and agree with the history as documented  E-Cigarette/Vaping    E-Cigarette Use Never User      E-Cigarette/Vaping Substances    Nicotine No     THC No     CBD No     Flavoring No     Other No     Unknown No      Social History     Tobacco Use    Smoking status: Never Smoker    Smokeless tobacco: Never Used    Tobacco comment: Pt stated "smokes when stressed"   Vaping Use    Vaping Use: Never used   Substance Use Topics    Alcohol use: Not Currently    Drug use: Not Currently       Review of Systems   Constitutional: Negative for fever     Psychiatric/Behavioral: Positive for dysphoric mood and suicidal ideas  All other systems reviewed and are negative  Physical Exam  Physical Exam  Vitals and nursing note reviewed  Constitutional:       Appearance: She is well-developed  HENT:      Head: Normocephalic and atraumatic  Right Ear: External ear normal       Left Ear: External ear normal       Nose: Nose normal    Eyes:      General: No scleral icterus  Cardiovascular:      Rate and Rhythm: Normal rate  Pulmonary:      Effort: Pulmonary effort is normal  No respiratory distress  Abdominal:      General: There is no distension  Musculoskeletal:         General: No deformity  Normal range of motion  Cervical back: Normal range of motion  Comments: Superficial abrasion to left forearm   Skin:     Findings: No rash  Neurological:      General: No focal deficit present  Mental Status: She is alert and oriented to person, place, and time     Psychiatric:         Mood and Affect: Mood normal          Vital Signs  ED Triage Vitals   Temperature Pulse Respirations Blood Pressure SpO2   07/20/22 1528 07/20/22 1528 07/20/22 1528 07/20/22 1528 07/20/22 1528   98 7 °F (37 1 °C) (!) 106 18 109/63 92 %      Temp Source Heart Rate Source Patient Position - Orthostatic VS BP Location FiO2 (%)   07/20/22 1528 07/20/22 1528 07/20/22 1706 07/20/22 1706 --   Temporal Monitor Sitting Right arm       Pain Score       07/20/22 1706       No Pain           Vitals:    07/20/22 1528 07/20/22 1706   BP: 109/63 112/69   Pulse: (!) 106 94   Patient Position - Orthostatic VS:  Sitting         Visual Acuity      ED Medications  Medications - No data to display    Diagnostic Studies  Results Reviewed     Procedure Component Value Units Date/Time    CBC and differential [[id number]]  (Abnormal) Collected: 07/20/22 1545    Lab Status: Final result Specimen: Blood from Arm, Right Updated: 07/20/22 1640     WBC 11 23 Thousand/uL      RBC 4 02 Million/uL      Hemoglobin 12 8 g/dL      Hematocrit 38 7 %      MCV 96 fL      MCH 31 8 pg      MCHC 33 1 g/dL      RDW 14 0 %      MPV 9 2 fL      Platelets 333 Thousands/uL     Narrative: This is an appended report  These results have been appended to a previously verified report  Manual Differential(PHLEBS Do Not Order) [[id number]]  (Abnormal) Collected: 07/20/22 1545    Lab Status: Final result Specimen: Blood from Arm, Right Updated: 07/20/22 1640     Segmented % 60 %      Bands % 1 %      Lymphocytes % 27 %      Monocytes % 6 %      Eosinophils, % 3 %      Basophils % 0 %      Metamyelocytes% 1 %      Myelocytes % 2 %      Absolute Neutrophils 6 85 Thousand/uL      Lymphocytes Absolute 3 03 Thousand/uL      Monocytes Absolute 0 67 Thousand/uL      Eosinophils Absolute 0 34 Thousand/uL      Basophils Absolute 0 00 Thousand/uL      Total Counted --     Platelet Estimate Adequate    COVID19, Influenza A/B, RSV PCR, [facility] [[id number]]  (Normal) Collected: 07/20/22 1545    Lab Status: Final result Specimen: Nares from Nose Updated: 07/20/22 1627     SARS-CoV-2 Negative     INFLUENZA A PCR Negative     INFLUENZA B PCR Negative     RSV PCR Negative    Narrative:      FOR PEDIATRIC PATIENTS - copy/paste COVID Guidelines URL to browser: [email or web address]    SARS-CoV-2 assay is a Nucleic Acid Amplification assay intended for the  qualitative detection of nucleic acid from SARS-CoV-2 in nasopharyngeal  swabs  Results are for the presumptive identification of SARS-CoV-2 RNA  Positive results are indicative of infection with SARS-CoV-2, the virus  causing COVID-19, but do not rule out bacterial infection or co-infection  with other viruses  Laboratories within the [place] and its  territories are required to report all positive results to the appropriate  public health authorities   Negative results do not preclude SARS-CoV-2  infection and should not be used as the sole basis for treatment or other  patient management decisions  Negative results must be combined with  clinical observations, patient history, and epidemiological information  This test has not been FDA cleared or approved  This test has been authorized by FDA under an Emergency Use Authorization  (EUA)  This test is only authorized for the duration of time the  declaration that circumstances exist justifying the authorization of the  emergency use of an in vitro diagnostic tests for detection of SARS-CoV-2  virus and/or diagnosis of COVID-19 infection under section 564(b)(1) of  the Act, 21 U  S C  259FOZ-7(U)(5), unless the authorization is terminated  or revoked sooner  The test has been validated but independent review by FDA  and CLIA is pending  Test performed using [employer] GeneXpert: This RT-PCR assay targets N2,  a region unique to SARS-CoV-2  A conserved region in the E-gene was chosen  for pan-Sarbecovirus detection which includes SARS-CoV-2      Comprehensive metabolic panel [[id number]]  (Abnormal) Collected: 07/20/22 1545    Lab Status: Final result Specimen: Blood from Arm, Right Updated: 07/20/22 1619     Sodium 134 mmol/L      Potassium 4 4 mmol/L      Chloride 98 mmol/L      CO2 27 mmol/L      ANION GAP 9 mmol/L      BUN 10 mg/dL      Creatinine 0 78 mg/dL      Glucose 99 mg/dL      Calcium 9 2 mg/dL      AST 24 U/L      ALT 30 U/L      Alkaline Phosphatase 72 U/L      Total Protein 7 7 g/dL      Albumin 3 5 g/dL      Total Bilirubin 0 40 mg/dL      eGFR 88 ml/min/1 73sq m     Narrative:      Cammie guidelines for Chronic Kidney Disease (CKD):     Stage 1 with normal or high GFR (GFR > 90 mL/min/1 73 square meters)    Stage 2 Mild CKD (GFR = 60-89 mL/min/1 73 square meters)    Stage 3A Moderate CKD (GFR = 45-59 mL/min/1 73 square meters)    Stage 3B Moderate CKD (GFR = 30-44 mL/min/1 73 square meters)    Stage 4 Severe CKD (GFR = 15-29 mL/min/1 73 square meters)    Stage 5 End Stage CKD (GFR <15 mL/min/1 73 square meters)  Note: GFR calculation is accurate only with a steady state creatinine    TSH, 3rd generation with Free T4 reflex [[id number]]  (Normal) Collected: 07/20/22 1545    Lab Status: Final result Specimen: Blood from Arm, Right Updated: 07/20/22 1619     TSH 3RD GENERATON 2 044 uIU/mL     Narrative:      Patients undergoing fluorescein dye angiography may retain small amounts of fluorescein in the body for 48-72 hours post procedure  Samples containing fluorescein can produce falsely depressed TSH values  If the patient had this procedure,a specimen should be resubmitted post fluorescein clearance  POCT alcohol breath test [[id number]]  (Normal) Resulted: 07/20/22 1531    Lab Status: Final result Updated: 07/20/22 1531     EXTBreath Alcohol 0 00                 No orders to display              Procedures  Procedures         ED Course                                             MDM  Number of Diagnoses or Management Options  Depression: new and requires workup  Suicidal ideations  Diagnosis management comments: 56-year-old female presenting with vague suicidal thoughts and depression  Discussed case with crisis, obtain labs  Per chart review, patient recently discharged from inpatient psychiatric treatment with documentation stating that repeat inpatient treatment may cause more harm than be beneficial for Leydi  Discussed with patient's care facility who would prefer that she be discharged  Leydi is agreeable with this plan         Amount and/or Complexity of Data Reviewed  Clinical lab tests: reviewed and ordered  Tests in the radiology section of CPT®: reviewed  Tests in the medicine section of CPT®: reviewed  Decide to obtain previous medical records or to obtain history from someone other than the patient: yes  Review and summarize past medical records: yes        Disposition  Final diagnoses:   Suicidal ideations   Depression     Time reflects when diagnosis was documented in both MDM as applicable and the Disposition within this note     Time User Action Codes Description Comment    7/20/2022  3:31 PM Margo Amel Add [L98 297] Suicidal ideations     7/20/2022  4:33 PM Margo Amel Add [F32  A] Depression       ED Disposition     ED Disposition   Discharge    Condition   Stable    Date/Time   Wed Jul 20, 2022  4:33 PM    Comment   Leydi Dove should be transferred out to [address] [place] [address] and has been medically cleared             Follow-up Information     Follow up With Specialties Details Why Contact Info Additional Information    Kimberly Woody MD Internal Medicine   [address]  [place] [address]  [address] [facility],[address] Emergency Department Emergency Medicine  If symptoms worsen [address] [address]  [address] [facility] Emergency Department, [address] [place] [address]          Discharge Medication List as of 7/20/2022  4:34 PM      CONTINUE these medications which have NOT CHANGED    Details   aspirin 81 mg chewable tablet Chew 1 tablet (81 mg total) daily, Starting Mon 6/27/2022, Until Wed 7/27/2022, Normal      atorvastatin (LIPITOR) 40 mg tablet Take 1 tablet (40 mg total) by mouth daily with dinner, Starting Mon 6/27/2022, Until Wed 7/27/2022, Normal      D-1000 Extra Strength 25 MCG (1000 UT) tablet Starting Tue 6/7/2022, Historical Med      dextromethorphan-guaifenesin (MUCINEX DM)  MG per 12 hr tablet Take 1 tablet by mouth every 12 (twelve) hours, Starting Wed 7/13/2022, Normal      divalproex sodium (DEPAKOTE ER) 500 mg 24 hr tablet Take 5 tablets (2,500 mg total) by mouth daily at bedtime, Starting Mon 6/27/2022, Until Wed 7/27/2022, Normal      !! haloperidol (HALDOL) 10 mg tablet Take 1 tablet (10 mg total) by mouth 2 (two) times a day Take 1 tablet in the morning and 1 tablet before bedtime, Starting Mon 6/27/2022, Until Wed 7/27/2022, Normal      !! haloperidol (HALDOL) 10 mg tablet Take 1 tablet (10 mg total) by mouth 2 (two) times a day, Starting Mon 6/27/2022, Until Fri 8/26/2022, Normal      !! haloperidol (HALDOL) 5 mg tablet Starting Tue 6/7/2022, Historical Med      !! levOCARNitine (CARNITOR) 330 MG tablet Take 330 mg by mouth daily, Starting Tue 3/22/2022, Until Wed 3/22/2023, Historical Med      !! levOCARNitine (CARNITOR) 330 MG tablet Starting Fri 5/6/2022, Historical Med      lisinopril (ZESTRIL) 20 mg tablet Take 1 tablet (20 mg total) by mouth daily, Starting Mon 6/27/2022, Until Wed 7/27/2022, Normal      melatonin 3 mg Starting Tue 7/5/2022, Historical Med      !! mirtazapine (REMERON) 30 mg tablet Take 1 tablet (30 mg total) by mouth daily at bedtime, Starting Mon 6/27/2022, Until Wed 7/27/2022, Normal      !! mirtazapine (REMERON) 45 MG tablet Take 1 tablet (45 mg total) by mouth daily at bedtime, Starting Mon 6/27/2022, Until Fri 8/26/2022, Normal      prazosin (MINIPRESS) 2 mg capsule Take 1 capsule (2 mg total) by mouth daily at bedtime, Starting Mon 6/27/2022, Until Wed 7/27/2022, Normal      senna-docusate sodium (SENOKOT S) 8 6-50 mg per tablet Take 1 tablet by mouth 2 (two) times a day, Starting Mon 6/27/2022, Until Wed 7/27/2022, Normal      ziprasidone (GEODON) 40 mg capsule Starting Tue 7/5/2022, Historical Med       !! - Potential duplicate medications found  Please discuss with provider  No discharge procedures on file      PDMP Review       Value Time User    PDMP Reviewed  Yes 5/6/2022 10:38 AM Stepan Steele MD          ED Provider  Electronically Signed by           Amanda Castro DO  07/20/22 1010

## 2022-07-27 ENCOUNTER — HOSPITAL ENCOUNTER (EMERGENCY)
Facility: HOSPITAL | Age: 52
End: 2022-07-28
Attending: EMERGENCY MEDICINE
Payer: MEDICARE

## 2022-07-27 DIAGNOSIS — Z91.52 HISTORY OF NON-SUICIDAL SELF-HARM: ICD-10-CM

## 2022-07-27 DIAGNOSIS — R45.851 SUICIDAL IDEATIONS: Primary | ICD-10-CM

## 2022-07-27 LAB
AMPHETAMINES SERPL QL SCN: NEGATIVE
BARBITURATES UR QL: NEGATIVE
BENZODIAZ UR QL: NEGATIVE
COCAINE UR QL: NEGATIVE
ETHANOL EXG-MCNC: 0 MG/DL
EXT PREG TEST URINE: NORMAL
EXT. CONTROL ED NAV: NORMAL
FLUAV RNA RESP QL NAA+PROBE: NEGATIVE
FLUBV RNA RESP QL NAA+PROBE: NEGATIVE
METHADONE UR QL: NEGATIVE
OPIATES UR QL SCN: NEGATIVE
OXYCODONE+OXYMORPHONE UR QL SCN: NEGATIVE
PCP UR QL: NEGATIVE
RSV RNA RESP QL NAA+PROBE: NEGATIVE
SARS-COV-2 RNA RESP QL NAA+PROBE: NEGATIVE
THC UR QL: NEGATIVE

## 2022-07-27 PROCEDURE — 82075 ASSAY OF BREATH ETHANOL: CPT | Performed by: EMERGENCY MEDICINE

## 2022-07-27 PROCEDURE — 80307 DRUG TEST PRSMV CHEM ANLYZR: CPT | Performed by: EMERGENCY MEDICINE

## 2022-07-27 PROCEDURE — 81025 URINE PREGNANCY TEST: CPT | Performed by: INTERNAL MEDICINE

## 2022-07-27 PROCEDURE — 0241U HB NFCT DS VIR RESP RNA 4 TRGT: CPT | Performed by: INTERNAL MEDICINE

## 2022-07-27 PROCEDURE — 99285 EMERGENCY DEPT VISIT HI MDM: CPT | Performed by: EMERGENCY MEDICINE

## 2022-07-27 PROCEDURE — 99285 EMERGENCY DEPT VISIT HI MDM: CPT

## 2022-07-27 RX ORDER — POLYETHYLENE GLYCOL 3350 17 G/17G
17 POWDER, FOR SOLUTION ORAL DAILY PRN
Status: CANCELLED | OUTPATIENT
Start: 2022-07-27

## 2022-07-27 RX ORDER — BENZTROPINE MESYLATE 1 MG/1
1 TABLET ORAL
Status: CANCELLED | OUTPATIENT
Start: 2022-07-27

## 2022-07-27 RX ORDER — MINERAL OIL AND PETROLATUM 150; 830 MG/G; MG/G
1 OINTMENT OPHTHALMIC
Status: CANCELLED | OUTPATIENT
Start: 2022-07-27

## 2022-07-27 RX ORDER — DIPHENHYDRAMINE HYDROCHLORIDE 50 MG/ML
50 INJECTION INTRAMUSCULAR; INTRAVENOUS EVERY 6 HOURS PRN
Status: CANCELLED | OUTPATIENT
Start: 2022-07-27

## 2022-07-27 RX ORDER — HYDROXYZINE HYDROCHLORIDE 25 MG/1
25 TABLET, FILM COATED ORAL
Status: CANCELLED | OUTPATIENT
Start: 2022-07-27

## 2022-07-27 RX ORDER — BISACODYL 10 MG
10 SUPPOSITORY, RECTAL RECTAL DAILY PRN
Status: CANCELLED | OUTPATIENT
Start: 2022-07-27

## 2022-07-27 RX ORDER — MAGNESIUM HYDROXIDE/ALUMINUM HYDROXICE/SIMETHICONE 120; 1200; 1200 MG/30ML; MG/30ML; MG/30ML
30 SUSPENSION ORAL EVERY 4 HOURS PRN
Status: CANCELLED | OUTPATIENT
Start: 2022-07-27

## 2022-07-27 RX ORDER — HYDROXYZINE HYDROCHLORIDE 25 MG/1
50 TABLET, FILM COATED ORAL
Status: CANCELLED | OUTPATIENT
Start: 2022-07-27

## 2022-07-27 RX ORDER — LORAZEPAM 2 MG/ML
1 INJECTION INTRAMUSCULAR
Status: CANCELLED | OUTPATIENT
Start: 2022-07-27

## 2022-07-27 RX ORDER — LORAZEPAM 2 MG/ML
2 INJECTION INTRAMUSCULAR EVERY 6 HOURS PRN
Status: CANCELLED | OUTPATIENT
Start: 2022-07-27

## 2022-07-27 RX ORDER — HALOPERIDOL 5 MG/1
5 TABLET ORAL
Status: CANCELLED | OUTPATIENT
Start: 2022-07-27

## 2022-07-27 RX ORDER — BENZTROPINE MESYLATE 1 MG/ML
1 INJECTION INTRAMUSCULAR; INTRAVENOUS
Status: CANCELLED | OUTPATIENT
Start: 2022-07-27

## 2022-07-27 RX ORDER — AMOXICILLIN 250 MG
1 CAPSULE ORAL DAILY PRN
Status: CANCELLED | OUTPATIENT
Start: 2022-07-27

## 2022-07-27 RX ORDER — ACETAMINOPHEN 325 MG/1
650 TABLET ORAL EVERY 6 HOURS PRN
Status: CANCELLED | OUTPATIENT
Start: 2022-07-27

## 2022-07-27 RX ORDER — HALOPERIDOL 5 MG/ML
2.5 INJECTION INTRAMUSCULAR
Status: CANCELLED | OUTPATIENT
Start: 2022-07-27

## 2022-07-27 RX ORDER — BENZTROPINE MESYLATE 1 MG/ML
0.5 INJECTION INTRAMUSCULAR; INTRAVENOUS
Status: CANCELLED | OUTPATIENT
Start: 2022-07-27

## 2022-07-27 RX ORDER — ACETAMINOPHEN 325 MG/1
650 TABLET ORAL EVERY 4 HOURS PRN
Status: CANCELLED | OUTPATIENT
Start: 2022-07-27

## 2022-07-27 RX ORDER — TRAZODONE HYDROCHLORIDE 50 MG/1
50 TABLET ORAL
Status: CANCELLED | OUTPATIENT
Start: 2022-07-27

## 2022-07-27 RX ORDER — HALOPERIDOL 1 MG/1
1 TABLET ORAL EVERY 6 HOURS PRN
Status: CANCELLED | OUTPATIENT
Start: 2022-07-27

## 2022-07-27 RX ORDER — HALOPERIDOL 5 MG/ML
5 INJECTION INTRAMUSCULAR
Status: CANCELLED | OUTPATIENT
Start: 2022-07-27

## 2022-07-27 RX ORDER — ACETAMINOPHEN 325 MG/1
975 TABLET ORAL EVERY 6 HOURS PRN
Status: CANCELLED | OUTPATIENT
Start: 2022-07-27

## 2022-07-27 RX ORDER — HYDROXYZINE HYDROCHLORIDE 25 MG/1
100 TABLET, FILM COATED ORAL
Status: CANCELLED | OUTPATIENT
Start: 2022-07-27

## 2022-07-27 RX ORDER — LORAZEPAM 2 MG/ML
2 INJECTION INTRAMUSCULAR
Status: CANCELLED | OUTPATIENT
Start: 2022-07-27

## 2022-07-27 RX ORDER — LANOLIN ALCOHOL/MO/W.PET/CERES
3 CREAM (GRAM) TOPICAL
Status: CANCELLED | OUTPATIENT
Start: 2022-07-27

## 2022-07-27 NOTE — ED ATTENDING ATTESTATION
7/27/2022  IArmando DO, saw and evaluated the patient  I have discussed the patient with the resident/non-physician practitioner and agree with the resident's/non-physician practitioner's findings, Plan of Care, and MDM as documented in the resident's/non-physician practitioner's note, except where noted  All available labs and Radiology studies were reviewed  I was present for key portions of any procedure(s) performed by the resident/non-physician practitioner and I was immediately available to provide assistance  At this point I agree with the current assessment done in the Emergency Department  I have conducted an independent evaluation of this patient a history and physical is as follows:    Patient is a 32-year-old female with a history of cognitive impairment, borderline personality disorder, depression, lives in a group home setting at [facility], brought in after cutting her left forearm with a plastic knife  She says she did this because she has repeated thoughts of depression, was hoping that this would harm herself  She can not tell me specifically what depressing her, just that she has a lot of racing thoughts  She did not take any overdose of medications  She denies any chest pain, denies any palpitations, denies fevers or chills, denies any pain anywhere    Per the discharge summary from her most recent psychiatric admission June 23rd through June 28, 2022, Leydi continues to demonstrate a pattern of behavior most consistent with severe borderline personality disorder and decreased tolerance for stress  Repeated inpatient behavioral health admissions will prove to be detrimental for this patient in the long run as it reinforce is negative behaviors "    Patient was seen July 20, 2022 at an outside emergency department for similar behavior    After crisis discussion with staff and review of previous encounters, decision was made to hurts the patient back to her supervised facility  General:  Patient is well-appearing  Head:  Atraumatic  Eyes:  Conjunctiva pink  ENT:  Mucous membranes are moist  Neck:  Supple  Cardiac:  S1-S2, without murmurs  Lungs:  Clear to auscultation bilaterally  Abdomen:  Soft, nontender, normal bowel sounds, no CVA tenderness, no tympany, no rigidity, no guarding  Extremities:  Left forearm has several linear scratches, no active bleeding, there on the ulnar aspect of the forearm, away from the radial and ulnar arteries  She has normal sensation throughout the entire hand and forearm  No bony tenderness to the radius, ulna, wrist, or hand  Neurologic:  Awake, fluent speech, normal comprehension  AAOx3  Skin:  Pink warm and dry  Psychiatric:  Appearance:  Dressed in paper scrubs; Speech:  No evidence of pressure; Mood:  Does not appear to be depressed, asking for a sandwich; Affect:  Mood congruent; Thought Process:  Goal-directed; Thought Content:  SI as above, denies HI            ED Course     Plan is for crisis evaluation and contacting her group home    Anticipate the patient will be discharged as prior psychiatrist have indicated hospitalization would not be beneficial  Signed out to Dr Caterina Isabel Time  Procedures

## 2022-07-27 NOTE — ED NOTES
After an additional 5 attempts to reach staff at [facility], Licia Kehr- staff member, answered call and expressed that their supervisor feels that if patient is not allowed to sign into the hospital, she will continue her same behaviors until she does  Licia Kehr states they are okay with patient signing in if she wishes to       RORY Santana  07/27/22   [id number]

## 2022-07-27 NOTE — ED NOTES
Call placed to [facility], selected option to speak with staff immediately and once staff answered call was transferred to patient's floor  Call then connected to [place] for [facility], and message was left requesting a return call       RORY Cabrera  07/27/22   0740

## 2022-07-27 NOTE — ED NOTES
Pt is a 47 y o  female who was brought to the ED from group home due to depression and suicidal ideations  Patient states that she has been having more suicidal thoughts, but denies any specific stressor or trigger outside of missing her boyfriend  Patient states she took a plastic knife today and scratched her forearm  Patient has a similar history of scratching herself, and seeking inpatient treatment  Patient has multiple inpatient admissions, 17x since January 2021, [facility]  Patient lives in a monitored group home and has a therapist, psychiatrist, and  through her home at [facility]  Patient states that she has been living there too many years now, and doesn't like it for that reason  Patient denies any real concerns with her living environment and denies anything that has changed recently making her depression worse now  Patient denies issue with appetite, as she ate a turkey sandwich throughout entire assessment  Patient reports difficulty with sleep, but does not elaborate on such  Patient continues to express that if she leaves the hospital she would kill herself by running into traffic  Patient does have a history of scratching herself, and previous suicide attempt via overdose about 2 years ago  Patient denies homicidal ideations and visual hallucinations  Patient initially expressed auditory hallucinations, but when asked specifically she just said they are her own thoughts  Patient feels like she has a lot of racing thoughts and her mind is what makes her want to kill herself  When asked what she does to try and cope with her thoughts, especially since chronic inpatient admissions, patient states that she doesn't do anything  Patient then states that is why she needs to be in the hospital  Patient continues to endorse suicidal ideations and requesting inpatient admission      Chief Complaint   Patient presents with    Psychiatric Evaluation     Patient presents from [address] [place] [address] after attempting cut her forearm with plastic knife  Patient admits to wanting to hurt herself with no desire to live   Denies HI     Intake Assessment completed, Safety risk Assessment completed    RORY Puente  07/27/22   0329

## 2022-07-27 NOTE — ED NOTES
Attempted to contact [facility] staff per recommendation of [phone]  Phone system indicates office hours to be 9a-5p, however still attempted contact with Melisa Orourke, care coordination, Estuardo, and Evangelina Given, therapist  Messages were left for Sanam and Lety Lopez requesting a return call       RORY Mendoza  07/27/22   2671

## 2022-07-27 NOTE — ED PROVIDER NOTES
History  Chief Complaint   Patient presents with    Psychiatric Evaluation     Patient presents from [facility] after attempting cut her forearm with plastic knife  Patient admits to wanting to hurt herself with no desire to live  Denies HI     HPI  67-year-old female with cognitive impairment, bipolar disorder, PTSD and obesity presents to ED for psychiatric evaluation  Patient reports she has been thinking about harming herself  She states she wants to run in front of a car to end her life  She states she has been using a plastic knife to make cuts over her forearm  She denies any homicidal ideation  She reports auditory hallucinations that are commanding her to kill herself  Denies any visual hallucinations  She is coming from a group home  Patient is a high utilizer  She reports her last inpatient psychiatric hospitalization was 5 weeks ago for suicide ideation and cutting  She denies any physical complaints or concerns at this time  She is asking when she can eat and what we have available for food  Prior to Admission Medications   Prescriptions Last Dose Informant Patient Reported? Taking?    D-1000 Extra Strength 25 MCG (1000 UT) tablet   Yes Yes   aspirin 81 mg chewable tablet   No Yes   Sig: Chew 1 tablet (81 mg total) daily   atorvastatin (LIPITOR) 40 mg tablet   No Yes   Sig: Take 1 tablet (40 mg total) by mouth daily with dinner   divalproex sodium (DEPAKOTE ER) 500 mg 24 hr tablet   No Yes   Sig: Take 5 tablets (2,500 mg total) by mouth daily at bedtime   haloperidol (HALDOL) 10 mg tablet   No Yes   Sig: Take 1 tablet (10 mg total) by mouth 2 (two) times a day Take 1 tablet in the morning and 1 tablet before bedtime   levOCARNitine (CARNITOR) 330 MG tablet   Yes Yes   lisinopril (ZESTRIL) 20 mg tablet   No Yes   Sig: Take 1 tablet (20 mg total) by mouth daily   lurasidone (Latuda) 40 mg tablet   Yes Yes   Sig: Take 40 mg by mouth 2 (two) times a day   melatonin 3 mg   Yes Yes mirtazapine (REMERON) 45 MG tablet   No Yes   Sig: Take 1 tablet (45 mg total) by mouth daily at bedtime   prazosin (MINIPRESS) 2 mg capsule   No Yes   Sig: Take 1 capsule (2 mg total) by mouth daily at bedtime   senna-docusate sodium (SENOKOT S) 8 6-50 mg per tablet   No Yes   Sig: Take 1 tablet by mouth 2 (two) times a day   ziprasidone (GEODON) 40 mg capsule   Yes Yes      Facility-Administered Medications: None       Past Medical History:   Diagnosis Date    Anxiety     Bipolar 1 disorder ([facility])     Bipolar affective disorder, depressed, severe ([facility] [address] ) 10/10/2021    [facility]line [facility] disorder [email or web address]     CAD (coronary artery disease)     Cognitive impairment     Depression     Dyslipidemia     GERD (gastroesophageal reflux disease)     Hypertension     Obesity     Psychiatric disorder     Psychiatric illness     PTSD (post-traumatic stress disorder)     Schizoaffective disorder ([facility])     Seizure ([email or web address] )        Past Surgical History:   Procedure Laterality Date    APPENDECTOMY      PATIENT DENIES HAVING APPENDIX REMOVED    CHOLECYSTECTOMY      FOOT SURGERY Right        Family History   Problem Relation Age of Onset    Kidney cancer Mother     Cerebral aneurysm Father      I have reviewed and agree with the history as documented  E-Cigarette/Vaping    E-Cigarette Use Never User      E-Cigarette/Vaping Substances    Nicotine No     THC No     CBD No     Flavoring No     Other No     Unknown No      Social History     Tobacco Use    Smoking status: Former Smoker    Smokeless tobacco: Never Used    Tobacco comment: Pt stated "smokes when stressed"   Vaping Use    Vaping Use: Never used   Substance Use Topics    Alcohol use: Not Currently    Drug use: Not Currently        Review of Systems   All other systems reviewed and are negative           Physical Exam  ED Triage Vitals   Temperature Pulse Respirations Blood Pressure SpO2   07/27/22 1623 07/27/22 1623 07/27/22 1623 07/27/22 1623 07/27/22 1623   98 1 °F (36 7 °C) 102 20 126/74 95 %      Temp Source Heart Rate Source Patient Position - Orthostatic VS BP Location FiO2 (%)   07/27/22 1623 07/27/22 1623 07/27/22 1623 07/27/22 1623 --   Oral Monitor Sitting Left arm       Pain Score       07/27/22 1621       No Pain             Orthostatic Vital Signs  Vitals:    07/27/22 1623   BP: 126/74   Pulse: 102   Patient Position - Orthostatic VS: Sitting       Physical Exam  PHYSICAL EXAM    Constitutional:  Well developed, obese, no acute distress, non-toxic appearance  HEENT:  Conjunctiva normal  Oropharynx moist  Respiratory:  No respiratory distress, normal breath sounds  Cardiovascular:  Normal rate, normal rhythm, no murmurs  GI:  Soft, nondistended, normal bowel sounds, nontender  :  No costovertebral angle tenderness   Musculoskeletal:  No edema, no tenderness, no deformities  Integument:  Well hydrated, no rash   Lymphatic:  No lymphadenopathy noted   Neurologic:  Alert & oriented, normal motor function, normal sensory function, no focal deficits noted   Psychiatric:  Speech and behavior appropriate     ED Medications  Medications - No data to display    Diagnostic Studies  Results Reviewed     Procedure Component Value Units Date/Time    COVID/FLU/RSV [[id number]]  (Normal) Collected: 07/27/22 2004    Lab Status: Final result Specimen: Nares from Nose Updated: 07/27/22 2107     SARS-CoV-2 Negative     INFLUENZA A PCR Negative     INFLUENZA B PCR Negative     RSV PCR Negative    Narrative:      FOR PEDIATRIC PATIENTS - copy/paste COVID Guidelines URL to browser: [email or web address]  [email or web address]    SARS-CoV-2 assay is a Nucleic Acid Amplification assay intended for the  qualitative detection of nucleic acid from SARS-CoV-2 in nasopharyngeal  swabs  Results are for the presumptive identification of SARS-CoV-2 RNA      Positive results are indicative of infection with SARS-CoV-2, the virus  causing COVID-19, but do not rule out bacterial infection or co-infection  with other viruses  Laboratories within the [place] and its  territories are required to report all positive results to the appropriate  public health authorities  Negative results do not preclude SARS-CoV-2  infection and should not be used as the sole basis for treatment or other  patient management decisions  Negative results must be combined with  clinical observations, patient history, and epidemiological information  This test has not been FDA cleared or approved  This test has been authorized by FDA under an Emergency Use Authorization  (EUA)  This test is only authorized for the duration of time the  declaration that circumstances exist justifying the authorization of the  emergency use of an in vitro diagnostic tests for detection of SARS-CoV-2  virus and/or diagnosis of COVID-19 infection under section 564(b)(1) of  the Act, 21 U  S C  038LNO-5(L)(0), unless the authorization is terminated  or revoked sooner  The test has been validated but independent review by FDA  and CLIA is pending  Test performed using [employer] GeneXpert: This RT-PCR assay targets N2,  a region unique to SARS-CoV-2  A conserved region in the E-gene was chosen  for pan-Sarbecovirus detection which includes SARS-CoV-2  POCT pregnancy, urine [[id number]]     Lab Status: No result     Rapid drug screen, urine [[id number]]  (Normal) Collected: 07/27/22 1650    Lab Status: Final result Specimen: Urine, Clean Catch Updated: 07/27/22 1724     Amph/Meth UR Negative     Barbiturate Ur Negative     Benzodiazepine Urine Negative     Cocaine Urine Negative     Methadone Urine Negative     Opiate Urine Negative     PCP Ur Negative     THC Urine Negative     Oxycodone Urine Negative    Narrative:      FOR MEDICAL PURPOSES ONLY  IF CONFIRMATION NEEDED PLEASE CONTACT THE LAB WITHIN 5 DAYS      Drug Screen Cutoff Levels:  AMPHETAMINE/METHAMPHETAMINES  1000 ng/mL  BARBITURATES     200 ng/mL  BENZODIAZEPINES     200 ng/mL  COCAINE      300 ng/mL  METHADONE      300 ng/mL  OPIATES      300 ng/mL  PHENCYCLIDINE     25 ng/mL  THC       50 ng/mL  OXYCODONE      100 ng/mL    POCT alcohol breath test [[id number]]  (Normal) Resulted: 07/27/22 1656    Lab Status: Final result Updated: 07/27/22 1656     EXTBreath Alcohol 0 000                 No orders to display         Procedures  Procedures      ED Course  ED Course as of 07/27/22 2233 Wed Jul 27, 2022   1644 SI with plan  High utilizer  Group home resident  Last psych admission 5 weeks ago  Krista Ton first line of contact is Care Coordinator Sanam from [facility] or [address] from [facility] at [phone]  No answer, no opportunity to leave VM  No other numbers listed   1700 Crisis team consulted   2027 201 signed for SI, placement pending    2232 Patient signed out to next provider                             SBIRT 20yo+    [address] Most Recent Value   SBIRT (25 yo +)    In order to provide better care to our patients, we are screening all of our patients for alcohol and drug use  Would it be okay to ask you these screening questions?  No Filed at: 07/27/2022 1638                MDM  Number of Diagnoses or Management Options     Amount and/or Complexity of Data Reviewed  Clinical lab tests: ordered and reviewed  Discussion of test results with the performing providers: yes  Review and summarize past medical records: yes  Discuss the patient with other providers: yes    Risk of Complications, Morbidity, and/or Mortality  Presenting problems: high  Diagnostic procedures: low        Disposition  Final diagnoses:   Suicidal ideations   History of non-suicidal self-harm     Time reflects when diagnosis was documented in both MDM as applicable and the Disposition within this note     Time User Action Codes Description Comment    7/27/2022  7:41 PM Braeden Stoddard Add [Y74 703] Suicidal ideations     7/27/2022  7:42 PM Min Ivory Tatianna Domínguez Add [Z91 [id number]] History of non-suicidal self-harm       ED Disposition     ED Disposition   Transfer to [address] [place] [address]   --    Date/Time   Wed Jul 27, 2022  7:42 PM    Comment   Leydi Aguirre should be transferred out to [facility] [place] [facility] and has been medically cleared  MD Documentation    Shauna De Souza Most Recent Value   Sending MD Dr Stanislav Cline    None         Patient's Medications   Discharge Prescriptions    No medications on file     No discharge procedures on file  PDMP Review       Value Time User    PDMP Reviewed  Yes 5/6/2022 10:38 AM James Baum MD           ED Provider  Attending physically available and evaluated Dallas Seip I managed the patient along with the ED Attending      Electronically Signed by         Ted Gaffney MD  07/27/22 4009

## 2022-07-28 ENCOUNTER — HOSPITAL ENCOUNTER (INPATIENT)
Facility: HOSPITAL | Age: 52
LOS: 6 days | Discharge: HOME | DRG: 885 | End: 2022-08-03
Attending: STUDENT IN AN ORGANIZED HEALTH CARE EDUCATION/TRAINING PROGRAM | Admitting: PSYCHIATRY & NEUROLOGY
Payer: MEDICARE

## 2022-07-28 VITALS
HEART RATE: 78 BPM | DIASTOLIC BLOOD PRESSURE: 74 MMHG | BODY MASS INDEX: 47.8 KG/M2 | SYSTOLIC BLOOD PRESSURE: 126 MMHG | TEMPERATURE: 97.4 F | HEIGHT: 64 IN | WEIGHT: 279.98 LBS | RESPIRATION RATE: 18 BRPM | OXYGEN SATURATION: 95 %

## 2022-07-28 DIAGNOSIS — R45.851 SUICIDAL IDEATIONS: ICD-10-CM

## 2022-07-28 DIAGNOSIS — E78.5 HYPERLIPIDEMIA, UNSPECIFIED HYPERLIPIDEMIA TYPE: ICD-10-CM

## 2022-07-28 DIAGNOSIS — I10 ESSENTIAL HYPERTENSION: ICD-10-CM

## 2022-07-28 DIAGNOSIS — E55.9 VITAMIN D DEFICIENCY: ICD-10-CM

## 2022-07-28 DIAGNOSIS — F32.3 MAJOR DEPRESSION WITH PSYCHOTIC FEATURES (HCC): Primary | ICD-10-CM

## 2022-07-28 DIAGNOSIS — E78.5 HYPERLIPIDEMIA: ICD-10-CM

## 2022-07-28 DIAGNOSIS — K59.00 CONSTIPATION: ICD-10-CM

## 2022-07-28 PROCEDURE — GZHZZZZ GROUP PSYCHOTHERAPY: ICD-10-PCS | Performed by: STUDENT IN AN ORGANIZED HEALTH CARE EDUCATION/TRAINING PROGRAM

## 2022-07-28 PROCEDURE — GZ59ZZZ INDIVIDUAL PSYCHOTHERAPY, PSYCHOPHYSIOLOGICAL: ICD-10-PCS | Performed by: STUDENT IN AN ORGANIZED HEALTH CARE EDUCATION/TRAINING PROGRAM

## 2022-07-28 PROCEDURE — 99221 1ST HOSP IP/OBS SF/LOW 40: CPT | Performed by: PSYCHIATRY & NEUROLOGY

## 2022-07-28 RX ORDER — ACETAMINOPHEN 325 MG/1
650 TABLET ORAL EVERY 6 HOURS PRN
Status: DISCONTINUED | OUTPATIENT
Start: 2022-07-28 | End: 2022-08-03 | Stop reason: HOSPADM

## 2022-07-28 RX ORDER — OLANZAPINE 10 MG/1
10 TABLET, ORALLY DISINTEGRATING ORAL ONCE
Status: COMPLETED | OUTPATIENT
Start: 2022-07-28 | End: 2022-07-28

## 2022-07-28 RX ORDER — POLYETHYLENE GLYCOL 3350 17 G/17G
17 POWDER, FOR SOLUTION ORAL DAILY PRN
Status: DISCONTINUED | OUTPATIENT
Start: 2022-07-28 | End: 2022-08-03 | Stop reason: HOSPADM

## 2022-07-28 RX ORDER — LORAZEPAM 2 MG/ML
2 INJECTION INTRAMUSCULAR EVERY 6 HOURS PRN
Status: DISCONTINUED | OUTPATIENT
Start: 2022-07-28 | End: 2022-08-03 | Stop reason: HOSPADM

## 2022-07-28 RX ORDER — MIRTAZAPINE 15 MG/1
45 TABLET, FILM COATED ORAL
Status: DISCONTINUED | OUTPATIENT
Start: 2022-07-28 | End: 2022-08-03 | Stop reason: HOSPADM

## 2022-07-28 RX ORDER — HYDROXYZINE HYDROCHLORIDE 25 MG/1
25 TABLET, FILM COATED ORAL
Status: DISCONTINUED | OUTPATIENT
Start: 2022-07-28 | End: 2022-08-03 | Stop reason: HOSPADM

## 2022-07-28 RX ORDER — BISACODYL 10 MG
10 SUPPOSITORY, RECTAL RECTAL DAILY PRN
Status: DISCONTINUED | OUTPATIENT
Start: 2022-07-28 | End: 2022-07-28

## 2022-07-28 RX ORDER — DIVALPROEX SODIUM 500 MG/1
2500 TABLET, EXTENDED RELEASE ORAL
Status: DISCONTINUED | OUTPATIENT
Start: 2022-07-28 | End: 2022-08-03 | Stop reason: HOSPADM

## 2022-07-28 RX ORDER — AMOXICILLIN 250 MG
1 CAPSULE ORAL 2 TIMES DAILY
Status: DISCONTINUED | OUTPATIENT
Start: 2022-07-28 | End: 2022-08-03 | Stop reason: HOSPADM

## 2022-07-28 RX ORDER — OMEGA-3S/DHA/EPA/FISH OIL/D3 300MG-1000
400 CAPSULE ORAL DAILY
Status: DISCONTINUED | OUTPATIENT
Start: 2022-07-28 | End: 2022-08-03 | Stop reason: HOSPADM

## 2022-07-28 RX ORDER — LORAZEPAM 2 MG/ML
2 INJECTION INTRAMUSCULAR
Status: DISCONTINUED | OUTPATIENT
Start: 2022-07-28 | End: 2022-08-03 | Stop reason: HOSPADM

## 2022-07-28 RX ORDER — HALOPERIDOL 1 MG/1
1 TABLET ORAL EVERY 6 HOURS PRN
Status: DISCONTINUED | OUTPATIENT
Start: 2022-07-28 | End: 2022-08-03 | Stop reason: HOSPADM

## 2022-07-28 RX ORDER — ACETAMINOPHEN 325 MG/1
975 TABLET ORAL EVERY 6 HOURS PRN
Status: DISCONTINUED | OUTPATIENT
Start: 2022-07-28 | End: 2022-08-03 | Stop reason: HOSPADM

## 2022-07-28 RX ORDER — OLANZAPINE 10 MG/1
10 TABLET, ORALLY DISINTEGRATING ORAL ONCE
Status: DISCONTINUED | OUTPATIENT
Start: 2022-07-28 | End: 2022-07-28

## 2022-07-28 RX ORDER — MINERAL OIL AND PETROLATUM 150; 830 MG/G; MG/G
1 OINTMENT OPHTHALMIC
Status: DISCONTINUED | OUTPATIENT
Start: 2022-07-28 | End: 2022-08-03 | Stop reason: HOSPADM

## 2022-07-28 RX ORDER — ASPIRIN 81 MG/1
81 TABLET, CHEWABLE ORAL DAILY
Status: DISCONTINUED | OUTPATIENT
Start: 2022-07-28 | End: 2022-08-03 | Stop reason: HOSPADM

## 2022-07-28 RX ORDER — ACETAMINOPHEN 325 MG/1
650 TABLET ORAL EVERY 4 HOURS PRN
Status: DISCONTINUED | OUTPATIENT
Start: 2022-07-28 | End: 2022-08-03 | Stop reason: HOSPADM

## 2022-07-28 RX ORDER — HALOPERIDOL 10 MG/1
10 TABLET ORAL 2 TIMES DAILY
Status: DISCONTINUED | OUTPATIENT
Start: 2022-07-28 | End: 2022-07-28 | Stop reason: HOSPADM

## 2022-07-28 RX ORDER — BENZTROPINE MESYLATE 1 MG/ML
0.5 INJECTION INTRAMUSCULAR; INTRAVENOUS
Status: DISCONTINUED | OUTPATIENT
Start: 2022-07-28 | End: 2022-08-03 | Stop reason: HOSPADM

## 2022-07-28 RX ORDER — HYDROXYZINE 50 MG/1
50 TABLET, FILM COATED ORAL
Status: DISCONTINUED | OUTPATIENT
Start: 2022-07-28 | End: 2022-08-03 | Stop reason: HOSPADM

## 2022-07-28 RX ORDER — HYDROXYZINE 50 MG/1
100 TABLET, FILM COATED ORAL
Status: DISCONTINUED | OUTPATIENT
Start: 2022-07-28 | End: 2022-08-03 | Stop reason: HOSPADM

## 2022-07-28 RX ORDER — DIPHENHYDRAMINE HYDROCHLORIDE 50 MG/ML
50 INJECTION INTRAMUSCULAR; INTRAVENOUS EVERY 6 HOURS PRN
Status: DISCONTINUED | OUTPATIENT
Start: 2022-07-28 | End: 2022-08-03 | Stop reason: HOSPADM

## 2022-07-28 RX ORDER — ATORVASTATIN CALCIUM 40 MG/1
40 TABLET, FILM COATED ORAL
Status: DISCONTINUED | OUTPATIENT
Start: 2022-07-28 | End: 2022-08-03 | Stop reason: HOSPADM

## 2022-07-28 RX ORDER — MAGNESIUM HYDROXIDE/ALUMINUM HYDROXICE/SIMETHICONE 120; 1200; 1200 MG/30ML; MG/30ML; MG/30ML
30 SUSPENSION ORAL EVERY 4 HOURS PRN
Status: DISCONTINUED | OUTPATIENT
Start: 2022-07-28 | End: 2022-08-03 | Stop reason: HOSPADM

## 2022-07-28 RX ORDER — HALOPERIDOL 10 MG/1
10 TABLET ORAL 2 TIMES DAILY
Status: DISCONTINUED | OUTPATIENT
Start: 2022-07-28 | End: 2022-08-03 | Stop reason: HOSPADM

## 2022-07-28 RX ORDER — LANOLIN ALCOHOL/MO/W.PET/CERES
3 CREAM (GRAM) TOPICAL
Status: DISCONTINUED | OUTPATIENT
Start: 2022-07-28 | End: 2022-07-28 | Stop reason: HOSPADM

## 2022-07-28 RX ORDER — PRAZOSIN HYDROCHLORIDE 1 MG/1
2 CAPSULE ORAL
Status: DISCONTINUED | OUTPATIENT
Start: 2022-07-28 | End: 2022-08-03 | Stop reason: HOSPADM

## 2022-07-28 RX ORDER — BENZTROPINE MESYLATE 1 MG/ML
1 INJECTION INTRAMUSCULAR; INTRAVENOUS
Status: DISCONTINUED | OUTPATIENT
Start: 2022-07-28 | End: 2022-08-03 | Stop reason: HOSPADM

## 2022-07-28 RX ORDER — LISINOPRIL 20 MG/1
20 TABLET ORAL DAILY
Status: DISCONTINUED | OUTPATIENT
Start: 2022-07-28 | End: 2022-08-03 | Stop reason: HOSPADM

## 2022-07-28 RX ORDER — HALOPERIDOL 5 MG/ML
2.5 INJECTION INTRAMUSCULAR
Status: DISCONTINUED | OUTPATIENT
Start: 2022-07-28 | End: 2022-08-03 | Stop reason: HOSPADM

## 2022-07-28 RX ORDER — POLYETHYLENE GLYCOL 3350 17 G/17G
17 POWDER, FOR SOLUTION ORAL DAILY PRN
Status: DISCONTINUED | OUTPATIENT
Start: 2022-07-28 | End: 2022-07-28

## 2022-07-28 RX ORDER — HALOPERIDOL 5 MG/ML
5 INJECTION INTRAMUSCULAR
Status: DISCONTINUED | OUTPATIENT
Start: 2022-07-28 | End: 2022-08-03 | Stop reason: HOSPADM

## 2022-07-28 RX ORDER — LANOLIN ALCOHOL/MO/W.PET/CERES
3 CREAM (GRAM) TOPICAL
Status: DISCONTINUED | OUTPATIENT
Start: 2022-07-28 | End: 2022-07-31

## 2022-07-28 RX ORDER — TRAZODONE HYDROCHLORIDE 50 MG/1
50 TABLET ORAL
Status: DISCONTINUED | OUTPATIENT
Start: 2022-07-28 | End: 2022-07-28

## 2022-07-28 RX ORDER — AMOXICILLIN 250 MG
1 CAPSULE ORAL DAILY PRN
Status: DISCONTINUED | OUTPATIENT
Start: 2022-07-28 | End: 2022-08-03 | Stop reason: HOSPADM

## 2022-07-28 RX ORDER — BENZTROPINE MESYLATE 1 MG/1
1 TABLET ORAL
Status: DISCONTINUED | OUTPATIENT
Start: 2022-07-28 | End: 2022-08-03 | Stop reason: HOSPADM

## 2022-07-28 RX ORDER — HALOPERIDOL 5 MG/1
2.5 TABLET ORAL
Status: DISCONTINUED | OUTPATIENT
Start: 2022-07-28 | End: 2022-08-03 | Stop reason: HOSPADM

## 2022-07-28 RX ORDER — HALOPERIDOL 5 MG/1
5 TABLET ORAL
Status: DISCONTINUED | OUTPATIENT
Start: 2022-07-28 | End: 2022-08-03 | Stop reason: HOSPADM

## 2022-07-28 RX ORDER — LORAZEPAM 2 MG/ML
1 INJECTION INTRAMUSCULAR
Status: DISCONTINUED | OUTPATIENT
Start: 2022-07-28 | End: 2022-08-03 | Stop reason: HOSPADM

## 2022-07-28 RX ORDER — BISACODYL 10 MG
10 SUPPOSITORY, RECTAL RECTAL DAILY PRN
Status: DISCONTINUED | OUTPATIENT
Start: 2022-07-28 | End: 2022-08-03 | Stop reason: HOSPADM

## 2022-07-28 RX ADMIN — PRAZOSIN HYDROCHLORIDE 2 MG: 1 CAPSULE ORAL at 21:26

## 2022-07-28 RX ADMIN — Medication 3 MG: at 21:26

## 2022-07-28 RX ADMIN — HALOPERIDOL 10 MG: 10 TABLET ORAL at 17:51

## 2022-07-28 RX ADMIN — MIRTAZAPINE 45 MG: 15 TABLET, FILM COATED ORAL at 21:26

## 2022-07-28 RX ADMIN — SENNOSIDES AND DOCUSATE SODIUM 1 TABLET: 50; 8.6 TABLET ORAL at 17:00

## 2022-07-28 RX ADMIN — ATORVASTATIN CALCIUM 40 MG: 40 TABLET, FILM COATED ORAL at 17:00

## 2022-07-28 RX ADMIN — DIVALPROEX SODIUM 2500 MG: 500 TABLET, EXTENDED RELEASE ORAL at 21:23

## 2022-07-28 RX ADMIN — OLANZAPINE 10 MG: 10 TABLET, ORALLY DISINTEGRATING ORAL at 05:22

## 2022-07-28 RX ADMIN — HALOPERIDOL 10 MG: 10 TABLET ORAL at 11:56
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
## 2022-07-28 NOTE — PLAN OF CARE
Problem: SELF HARM/SUICIDALITY  Goal: Will have no self-injury during hospital stay  Description: INTERVENTIONS:  - Q 15 MINUTES: Routine safety checks  - Q WAKING SHIFT & PRN: Assess risk to determine if routine checks are adequate to maintain patient safety  - Encourage patient to participate actively in care by formulating a plan to combat response to suicidal ideation, identify supports and resources  Outcome: Progressing     Problem: PSYCHOSIS  Goal: Will report no hallucinations or delusions  Description: Interventions:  - Administer medication as  ordered  - Every waking shifts and PRN assess for the presence of hallucinations and or delusions  - Assist with reality testing to support increasing orientation  - Assess if patient's hallucinations or delusions are encouraging self-harm or harm to others and intervene as appropriate  Outcome: Progressing     Problem: ANXIETY  Goal: Will report anxiety at manageable levels  Description: INTERVENTIONS:  - Administer medication as ordered  - Teach and encourage coping skills  - Provide emotional support  - Assess patient/family for anxiety and ability to cope  Outcome: Progressing  Goal: By discharge: Patient will verbalize 2 strategies to deal with anxiety  Description: Interventions:  - Identify any obvious source/trigger to anxiety  - Staff will assist patient in applying identified coping technique/skills  - Encourage attendance of scheduled groups and activities  Outcome: Progressing     Problem: Ineffective Coping  Goal: Participates in unit activities  Description: Interventions:  - Provide therapeutic environment   - Provide required programming   - Redirect inappropriate behaviors   Outcome: Progressing

## 2022-07-28 NOTE — ED NOTES
Patient is accepted at [facility]  Patient is accepted by Dr Susy Moss per Carolyn Gilbert (Intake)     Transportation is arranged with [facility]   Transportation is scheduled for 7/28/22  Patient will leave with [facility] at 1700 hours  Nurse report is to be called  prior to patient transfer

## 2022-07-28 NOTE — ED NOTES
[facility]  07/27/22 2011

## 2022-07-28 NOTE — ED NOTES
Patient leaving her stretcher, yelling and cursing at staff, biting her arm on purpose  Redirected back to bed, staff able to calm patient verbally        Sultana Raymond RN  07/28/22 8991

## 2022-07-28 NOTE — TREATMENT PLAN
TREATMENT PLAN REVIEW - Dg [age] y o  1970 female MRN: [id number]    [address] [place] [address] Room / Bed: [facility] 254/[facility] [address] Encounter: [id number]          Admit Date/Time:  7/28/2022  2:33 PM    Treatment Team: Attending Provider: Doreen Tucker MD; Patient Care Technician: Gt March; Patient Care Technician: Yusuf Cordova;  : Krishan Garcia; Registered Nurse: Jesus Storey RN; Charge Nurse: Jae Rahman RN; Patient Care Assistant: Loras Bamberger    Diagnosis: Principal Problem:    Borderline personality disorder [facility]      Patient Strengths/Assets: patient is on a voluntary commitment    Patient Barriers/Limitations: unresourceful    Short Term Goals: decrease in depressive symptoms, decrease in suicidal thoughts    Long Term Goals: improvement in depression, free of suicidal thoughts    Progress Towards Goals: continue psychiatric medications as prescribed    Recommended Treatment: medication management, patient medication education, group therapy, milieu therapy, continued Behavioral Health psychiatric evaluation/assessment process    Treatment Frequency: daily medication monitoring, group and milieu therapy daily, monitoring through interdisciplinary rounds, monitoring through weekly patient care conferences    Expected Discharge Date:  5 days    Discharge Plan: referrals as indicated    Treatment Plan Created/Updated By: Raiza Patiño MD

## 2022-07-28 NOTE — ED NOTES
Patient resting at this time  No needs expressed  No signs of distress       Morena Sparks IV, RN  07/28/22 7272

## 2022-07-28 NOTE — ED NOTES
Patient received from previous shift  Calm and cooperative  Waiting for transfer to [facility] at 1700  VSS on RA  1:1 at bedside        Partha Kaur RN  07/28/22 0800

## 2022-07-28 NOTE — ED NOTES
Patient sitting quietly at this time  No needs expressed       Tova Meeks IV, JOSÉ MIGUEL  07/28/22 9050

## 2022-07-28 NOTE — PROGRESS NOTES
Status: Pt is a 201 from [facility] ER who presented with SI to cut herself, & she did superficially cut herself with a plastic knife, prior to coming to the ER  Pt reported decreased sleep & said that if she were to be discharged, she would walk into traffic  Transportation is expected for 1800  Medication: to be reviewed /   D/C: TBD / new admission    This patient is a 30 day readmission and [facility]  The previous discharge plan was:   Pt returned to [facility]  She had follow-up appointments with her psychiatrist & therapist the following day(6/29)  The [place] Readmission Risk score is:   25(red)  The identified triggers/events leading up to this admission include:  Pt not able to identify a single stressor/trigger, reports just started feeling suicidal & called 911 after trying to cut herself with a plastic knife  Initial Plans for this admission (and who will be involved in treatment and discharge planning include:  Pt will meet with the psychiatrist to review medication(s) & determine if changes or titration is needed  CM will work with Pt & their supports to identify services/treatment needed at discharge  Nursing will provide education/support on medication  Therapeutic services will facilitate groups & provide support education on coping skills         07/28/22 4250   Team Meeting   Meeting Type Daily Rounds   Team Members Present   Team Members Present Physician;Nurse;   Physician Team Member Dr Estrella Chanel / Krissy Villalta Team Member Sera Oliver / Martha Bauman Management Team Member Renata Girssom / Martha Nicholas   Patient/Family Present   Patient Present No   Patient's Family Present No

## 2022-07-28 NOTE — ED NOTES
Patient sleeping at this time  No needs expressed  No signs of distress       Cate Appiah IV, JOSÉ MIGUEL  07/27/22 1400

## 2022-07-28 NOTE — NURSING NOTE
Locker   Shoes   And green tote bag   No cellphone upon arrival     Bedside   Dress   Under wear   Gray pants   Blue pants   Purple shirt

## 2022-07-28 NOTE — ED NOTES
Patient continues biting herself, states she doesn't want to go to [facility] and wants to stay here in the ER  Psych, social work, security to see patient  Redirected by 1:1 royce Perry RN  07/28/22 1041

## 2022-07-28 NOTE — CASE MANAGEMENT
Readmit score: 25 (RED)   Confirmed Address:        [place]: [address] [facility] ([address], [address], [address] [facility] ([facility], [address])     [facility]   Resides in the home with:    [facility] - multiple unrelated individuals  Pt does have her own room with her own mini fridge & TV  Pt said that she thinks she would like to have a roommate, as she gets lonely  Pt Will Return Home at Discharge: Yes   Confirmed Phone Number: (residence) [phone] Fax: [phone]  Care Coordinator - Donna Smith ext  [phone] or Yayo ext  [phone]   Confirmed Email Address: None    Marital Status:         Children: Single, never      None   Family History:            Adopted mother -   Does have some aunts but minimally involved  Commitment Status:   201     Admitted from: [facility] on 2022 @ 33 64 74   Presenting C/O:       Pt reported she felt suicidal & didn't want to live  Pt not able to identify a stress/trigger  Past Inpatient Tx:    2022 to 2022: [facility]  5/15/2022 to 2022: [facility]  2022 to 2022: [facility] [address]  2022 to 2022: [facility]  2/10/2022 to 2022: [facility]  2021 to 2022: [facility]  -: [facility]  -2021: [facility]  -: [facility]  10/9-15/2021: [facility]  2021: Susi Shafer  2021: Chandrika  2021: Lindsey  2021: Julieta Correa  : Chandrika  2021: Cortney Hart  -: [facility]  Pt reported first hospitalization at age 16 y/o   Current outpatient:     Psychiatrist:    Spring Leo  [phone] F  [phone]   Therapist: Elmer Rosales ext  [phone]   ACT/ICM/CPS/WRT/SC: N/A has residential care coordinator - [facility]   PCP:    Unsure   Hx:    Pt denied any current medical concerns  Pt reported last seizure was over 4 yrs ago   Medications:    Pt reported she takes her medications      Pharmacy:    [facility] [phone]   Spirituality/Mosque:   Gnosticist / no request   Education:   12th grade / special education   Work/Income:     Preferred time for appts: SSDI - unknown amount        Residence schedules   Legal:    Pt denied   Access to Firearms:   Pt denied   Transportation:    Residence provides transportation as needed  Referrals Needed: None - Pt lives in a [facility] who provide for all her needs  She has outpatient at [facility]  Transport at Discharge: [address] will transport home  IMM:  7/29/2022 Brian Text PRECIOUS: N/A   Emergency Contact:    Edward Kimball HKGBFD([facility])[phone]   ROIs obtained:   [facility] - outpatient & residential   Insurance:     Medicare A+B   COB: [address]    Audit: 0       PAWSS: 0 BAT: 0 UDS: negative   Substance Abuse: Freq  Amount Last Use Notes:   Heroin           Amp/Meth           Alcohol           Cocaine           Cannabis           Benzodiazepine           Barbiturates           Other           Tobacco Pt denied any current smoking           Pt read & signed her Treatment Plan  Pt stating she doesn't want to go home

## 2022-07-28 NOTE — EMTALA/ACUTE CARE TRANSFER
[address] [facility] [address]  Dept: [phone]      EMTALA TRANSFER CONSENT    NAME Leydi Conn 1970                              MRN [id number]    I have been informed of my rights regarding examination, treatment, and transfer   by Dr Piedad Mcmillan MD    Benefits: Specialized equipment and/or services available at the receiving facility (Include comment)________________________ (Inpatient psych care)    Risks: Potential for delay in receiving treatment, Potential deterioration of medical condition, Increased discomfort during transfer, Possible worsening of condition or death during transfer      Consent for Transfer:  I acknowledge that my medical condition has been evaluated and explained to me by the emergency department physician or other qualified medical person and/or my attending physician, who has recommended that I be transferred to the service of  Accepting Physician: Jaclyn Land at [address] [place] [address] Name, [address] : [address] [place], [place]  The above potential benefits of such transfer, the potential risks associated with such transfer, and the probable risks of not being transferred have been explained to me, and I fully understand them  The doctor has explained that, in my case, the benefits of transfer outweigh the risks  I agree to be transferred  I authorize the performance of emergency medical procedures and treatments upon me in both transit and upon arrival at the receiving facility  Additionally, I authorize the release of any and all medical records to the receiving facility and request they be transported with me, if possible  I understand that the safest mode of transportation during a medical emergency is an ambulance and that the Hospital advocates the use of this mode of transport   Risks of traveling to the receiving facility by car, including absence of medical control, life sustaining equipment, such as oxygen, and medical personnel has been explained to me and I fully understand them  (PRAKASH CORRECT BOX BELOW)  [  ]  I consent to the stated transfer and to be transported by ambulance/helicopter  [  ]  I consent to the stated transfer, but refuse transportation by ambulance and accept full responsibility for my transportation by car  I understand the risks of non-ambulance transfers and I exonerate the Hospital and its staff from any deterioration in my condition that results from this refusal     X___________________________________________    DATE  22  TIME________  Signature of patient or legally responsible individual signing on patient behalf           RELATIONSHIP TO PATIENT_________________________          Provider Certification    NAME Leydi De                                         1970                              MRN [id number]    A medical screening exam was performed on the above named patient  Based on the examination:    Condition Necessitating Transfer The primary encounter diagnosis was Suicidal ideations  A diagnosis of History of non-suicidal self-harm was also pertinent to this visit      Patient Condition: The patient has been stabilized such that within reasonable medical probability, no material deterioration of the patient condition or the condition of the unborn child(courtney) is likely to result from the transfer    Reason for Transfer: Level of Care needed not available at this facility    Transfer Requirements: [address], [place]   · Space available and qualified personnel available for treatment as acknowledged by PACS  · Agreed to accept transfer and to provide appropriate medical treatment as acknowledged by       Evi Núñez  · Appropriate medical records of the examination and treatment of the patient are provided at the time of transfer   [address] _______  · Transfer will be performed by qualified personnel from [employer]  and appropriate transfer equipment as required, including the use of necessary and appropriate life support measures  Provider Certification: I have examined the patient and explained the following risks and benefits of being transferred/refusing transfer to the patient/family:  Consent was not obtained as patient is committed to psychiatric facility and transfer is mandated      Based on these reasonable risks and benefits to the patient and/or the unborn child(courtney), and based upon the information available at the time of the patients examination, I certify that the medical benefits reasonably to be expected from the provision of appropriate medical treatments at another medical facility outweigh the increasing risks, if any, to the individuals medical condition, and in the case of labor to the unborn child, from effecting the transfer      X____________________________________________ DATE 07/28/22        TIME_______      ORIGINAL - SEND TO MEDICAL RECORDS   COPY - SEND WITH PATIENT DURING TRANSFER

## 2022-07-28 NOTE — CMS CERTIFICATION NOTE
Recertification: Based upon physical, mental and social evaluations, I certify that inpatient psychiatric services continue to be medically necessary for this patient for a duration of 5 midnights for the treatment of  Borderline personality disorder [facility])   Available alternative community resources still do not meet the patient's mental health care needs  I further attest that an established written individualized plan of care has been updated and is outlined in the patient's medical records

## 2022-07-28 NOTE — ED NOTES
Patient lying quietly at this time  No signs of distress, no needs expressed       Seble Velásquez IV, RN  07/28/22 4403

## 2022-07-28 NOTE — ED RE-EVALUATION NOTE
Patient restless and agitated  home meds ordered       [address] [facility],   07/28/22 9122

## 2022-07-28 NOTE — ED NOTES
Patient lying quietly  No complaints  No signs of distress       Dayami So IV, JOSÉ MIGUEL  07/28/22 8451

## 2022-07-28 NOTE — CASE MANAGEMENT
ROSE met with Pt, & she reported she felt suicidal again  CM inquired if her boyfriend was in the hospital, but she said no, & that he lived far away from her; she said this is an ex-boyfriend  CM asked how Pt ended [facility] & she said that she had asked to be taken there  Pt agreed to sign & complete intake forms tomorrow but gave CM verbal permission to call her residential care coordinator  ROSE contacted Sanam care coordinator at [facility] @ [phone] & left a message to notify her of Pt's admission to the unit & request a return call

## 2022-07-28 NOTE — ED NOTES
Spoke with pt to inform her of acceptance to [facility]  Pt understands she will be transported at 1700

## 2022-07-28 NOTE — ED NOTES
Patient sitting quietly  No needs expressed, no signs of distress       Hanny Villalba IV, JOSÉ MIGUEL  07/27/22 7038

## 2022-07-28 NOTE — NURSING NOTE
Pt is a 89 arriving with SI to jump into traffic and cut self  Pt states she was at her group home and began to miss her boyfriend  Pt states she then took a plastic knife and self harmed to left forearm making two linear superficial lacerations to arm  Pt states having CAH to harm self  Denies HI, VH  Pt reports she continues to have SI with thoughts to bite self on the unit  Pt holding arm up to mouth several times stating she was feeling anxious and wanting to bite self  Pt was able to be verbally de-escalated  Pt denies drug or alcohol use  Non-smoker  Medical hx of seizures  Pt high risk on c-ssrs- Dr Rosie Baer aware  Pt agreeable to sit by nurses station when feeling unsafe  Pt room is also close to the nurses station  No further orders at this time

## 2022-07-29 PROBLEM — Z91.52 HISTORY OF NON-SUICIDAL SELF-HARM: Status: ACTIVE | Noted: 2022-07-29

## 2022-07-29 PROCEDURE — 99222 1ST HOSP IP/OBS MODERATE 55: CPT

## 2022-07-29 PROCEDURE — 99232 SBSQ HOSP IP/OBS MODERATE 35: CPT | Performed by: PHYSICIAN ASSISTANT

## 2022-07-29 RX ORDER — LORAZEPAM 1 MG/1
1 TABLET ORAL ONCE AS NEEDED
Status: COMPLETED | OUTPATIENT
Start: 2022-07-29 | End: 2022-07-29

## 2022-07-29 RX ADMIN — LORAZEPAM 1 MG: 1 TABLET ORAL at 17:42

## 2022-07-29 RX ADMIN — PRAZOSIN HYDROCHLORIDE 2 MG: 1 CAPSULE ORAL at 21:07

## 2022-07-29 RX ADMIN — Medication 3 MG: at 21:07

## 2022-07-29 RX ADMIN — MIRTAZAPINE 45 MG: 15 TABLET, FILM COATED ORAL at 21:07

## 2022-07-29 RX ADMIN — DIVALPROEX SODIUM 2500 MG: 500 TABLET, EXTENDED RELEASE ORAL at 21:07

## 2022-07-29 RX ADMIN — HALOPERIDOL 10 MG: 10 TABLET ORAL at 17:00

## 2022-07-29 RX ADMIN — SENNOSIDES AND DOCUSATE SODIUM 1 TABLET: 50; 8.6 TABLET ORAL at 17:00

## 2022-07-29 RX ADMIN — ATORVASTATIN CALCIUM 40 MG: 40 TABLET, FILM COATED ORAL at 16:12

## 2022-07-29 NOTE — PROGRESS NOTES
Progress Note - Behavioral Health     Leydi Khan 47 y o  female MRN: [id number]   Unit/Bed#: -01 Encounter: [id number]    Behavior over the last 24 hours: unchanged  Leydi is a 20-year-old female with a history of bipolar disorder and borderline personality disorder who presents for psychiatric follow-up  She is well known to this interviewer from numerous prior admissions  She is somewhat sedated and interviewed at bedside upon approach today  She states that she did not sleep well, did not get up for breakfast either  She reports feeling tired, not like myself    She reports feeling depressed amidst chronic hallucinations of voices telling her to kill herself and that she is ugly  She is able to contract for safety on the unit  Was restarted on group home meds as of last night  She denies SI and HI and goes back to sleep  No additional concerns      Sleep: frequent awakenings, restless sleep  Appetite: decreased  Medication side effects: No   ROS: all other systems are negative    Mental Status Evaluation:    Appearance:  disheveled, looks older than stated age   Behavior:  guarded   Speech:  scant   Mood:  depressed   Affect:  blunted, mood-congruent   Thought Process:  organized, decreased rate of thoughts, negative thinking, concrete   Associations: concrete associations   Thought Content:  no overt delusions   Perceptual Disturbances: no visual hallucinations, auditory hallucinations with commands to harm self and with derogatory comments, does not appear responding to internal stimuli, does not appear preoccupied   Risk Potential: Suicidal ideation - None at present, contracts for safety on the unit  Homicidal ideation - None at present  Potential for aggression - No   Sensorium:  unable to assess   Memory:  recent and remote memory grossly intact   Consciousness:  sedated   Attention/Concentration: attention span and concentration appear shorter than expected for age   Insight:  fair Judgment: fair   Gait/Station: in bed   Motor Activity: no abnormal movements     Vital signs in last 24 hours:    Temp:  [97 7 °F (36 5 °C)-99 1 °F (37 3 °C)] 99 1 °F (37 3 °C)  HR:  [] 80  Resp:  [16-18] 16  BP: (108-144)/(61-82) 128/66    Laboratory results: I have personally reviewed all pertinent laboratory/tests results    Results from the past 24 hours: No results found for this or any previous visit (from the past 24 hour(s))  Most Recent Labs:   Lab Results   Component Value Date    WBC 11 23 (H) 07/20/2022    RBC 4 02 07/20/2022    HGB 12 8 07/20/2022    HCT 38 7 07/20/2022     07/20/2022    RDW 14 0 07/20/2022    NEUTROABS 5 19 06/29/2022    SODIUM 134 (L) 07/20/2022    K 4 4 07/20/2022    CL 98 07/20/2022    CO2 27 07/20/2022    BUN 10 07/20/2022    CREATININE 0 78 07/20/2022    GLUC 99 07/20/2022    CALCIUM 9 2 07/20/2022    AST 24 07/20/2022    ALT 30 07/20/2022    ALKPHOS 72 07/20/2022    TP 7 7 07/20/2022    ALB 3 5 07/20/2022    TBILI 0 40 07/20/2022    CHOLESTEROL 169 04/28/2022    HDL 45 (L) 04/28/2022    TRIG 180 (H) 04/28/2022    LDLCALC 88 04/28/2022    NONHDLC 124 04/28/2022    VALPROICTOT 85 06/21/2022    UZR9HJUFHWFY 2 044 07/20/2022    PREGUR NEG 07/27/2022    PREGSERUM Negative 04/28/2022    RPR Non-Reactive 02/13/2022    HGBA1C 4 9 07/05/2022    EAG 94 07/05/2022       Progress Toward Goals: progressing    Assessment/Plan   Principal Problem:    Borderline personality disorder ([facility] )  Active Problems:    Primary hypertension    Hyperlipidemia    Medical clearance for psychiatric admission    Seizures ([facility] )      Recommended Treatment:     Planned medication and treatment changes: All current active medications have been reviewed  Encourage group therapy, milieu therapy and occupational therapy  Behavioral Health checks every 7 minutes    Labs to be re-ordered, Leydi is a difficult stick and we were not able too get labs last admission either      Home meds were restarted, continue current regimen  Will await VPA level  Can reduce daytime Haldol if sedation persists      Current Facility-Administered Medications   Medication Dose Route Frequency Provider Last Rate    acetaminophen  650 mg Oral Q6H PRN Reid Torres, MD      acetaminophen  650 mg Oral Q4H PRN Reid Torres, MD      acetaminophen  975 mg Oral Q6H PRN Reid Torres MD      aluminum-magnesium hydroxide-simethicone  30 mL Oral Q4H PRN Reid Torres MD      artificial tear  1 application Both Eyes P2I PRN Reid Torres, MD      aspirin  81 mg Oral Daily Lazara Lopez MD      atorvastatin  40 mg Oral Daily With Teresa Mack MD      haloperidol lactate  2 5 mg Intramuscular Q4H PRN Max 4/day Reid oTrres MD      And    LORazepam  1 mg Intramuscular Q4H PRN Max 4/day Reid Torres MD      And    benztropine  0 5 mg Intramuscular Q4H PRN Max 4/day Reid Torres MD      haloperidol lactate  5 mg Intramuscular Q4H PRN Max 4/day Reid Torres MD      And    LORazepam  2 mg Intramuscular Q4H PRN Max 4/day Reid Torres MD      And    benztropine  1 mg Intramuscular Q4H PRN Max 4/day Reid Torres MD      benztropine  1 mg Oral Q4H PRN Max 6/day Reid Torres MD      bisacodyl  10 mg Rectal Daily PRN Nilson Rachel MD      cholecalciferol  400 Units Oral Daily Lazara Lopez MD      hydrOXYzine HCL  50 mg Oral Q6H PRN Max 4/day MD Jero Thakkar diphenhydrAMINE  50 mg Intramuscular Q6H PRN Reid Torres MD      divalproex sodium  2,500 mg Oral HS Lazara Lopez MD      haloperidol  1 mg Oral Q6H PRN Reid Torres MD      haloperidol  10 mg Oral BID Lazara Lopez MD      haloperidol  2 5 mg Oral Q4H PRN Max 4/day Reid Torres MD      haloperidol  5 mg Oral Q4H PRN Max 4/day Reid Torres MD      hydrOXYzine HCL  100 mg Oral Q6H PRN Max 4/day Reid Torres MD      Or  LORazepam  2 mg Intramuscular Q6H PRN Lucas Arora MD      hydrOXYzine HCL  25 mg Oral Q6H PRN Max 4/day Lucas Arora MD      lisinopril  20 mg Oral Daily Sasha Yeh MD      melatonin  3 mg Oral HS Lucas Arora MD      mirtazapine  45 mg Oral HS Sasha Yeh MD      polyethylene glycol  17 g Oral Daily PRN Evelyn Roca MD      prazosin  2 mg Oral HS Sasha Yeh MD      senna-docusate sodium  1 tablet Oral Daily PRN Lucas Arora MD      senna-docusate sodium  1 tablet Oral BID Sasha Yeh MD       Risks / Benefits of Treatment:    Risks, benefits, and possible side effects of medications explained to patient and patient verbalizes understanding and agreement for treatment  Counseling / Coordination of Care:    Patient's progress discussed with staff in treatment team meeting  Medications, treatment progress and treatment plan reviewed with patient      Jannette Tadeo PA-C 07/29/22

## 2022-07-29 NOTE — PROGRESS NOTES
Treatment Plan Meeting:  Diagnosis of Borderline personality disorder reviewed  Discussed short term goals for decrease in depressive symptoms, & decrease in suicidal thoughts  Pt is a high utilizer of ER/Inpatient [employer]  She resides in a [facility]([employer]), however, reports being unhappy with her placement  Pt was previously d/c from [facility] on 6/28/2022 & a meeting was held as her residence was requesting EAC referral be completed at that time  CM & Pt's insurance reported that Pt was not appropriate for such a referral & it may be necessary to have conversations regarding her residential level of care  At this time, no discharge date is set  Treatment team's goal is to keep LOS minimal; Pt has already stated she does not like being discharged quickly & wants longer admissions, however, even with longer admissions, she readmits quickly once discharged back to the community/[facility]  All parties in agreement & treatment plan was signed       07/29/22 1215   Team Meeting   Meeting Type Tx Team Meeting   Initial Conference Date 07/29/22   Next Conference Date 08/25/22   Team Members Present   Team Members Present Physician;Nurse;   Physician Team Member Dr Rei Francisco Team Member [address] Management Team Member Eldon   Patient/Family Present   Patient Present No   Patient's Family Present No

## 2022-07-29 NOTE — NURSING NOTE
Leydi reported +CAH, "The voices are telling me to hurt myself " Patient given extensive staff support while sitting across from nurse's station  Per PA-C, patient given one time dose of Ativan 1mg  Dheeraj  = 25  Upon follow-up for re-assessment, patient is laying in bed, appears to be resting comfortably, denies further CAH at this time  Patient encouraged to seek staff with any concerns, verbalized understanding  Q7 minute observational status to continue for promotion of patient safety

## 2022-07-29 NOTE — PROGRESS NOTES
Status: Upon arrival the unit yesterday, Pt attempting to bite at her hands, so she was placed in a chair by the nurses station for observation  Pt initially also reported she couldn't contract for safety on the unit, but after speaking further with staff she agreed she could  Order placed that Pt is not to have pens/penciles & she is on a finger food diet - this will be updated  Pt stated she doesn't like that this unit discharges her so quickly & she wants to stay in the hospital longer  Pt slept overnight  Medication: medications started / no PRNs  D/C: TBD /      07/29/22 0750   Team Meeting   Meeting Type Daily Rounds   Team Members Present   Team Members Present Physician;Nurse; Other (Discipline and Name);    Physician Team Member Dr Pari Delatorre / Bossman Hamm / Audrey Daley Team Member Claire Theodore / Phani Li Management Team Member Dimple Beasley / Rajesh Cedillo   Other (Discipline and Name) Jenni Corona (art therapy)   Patient/Family Present   Patient Present No   Patient's Family Present No

## 2022-07-29 NOTE — NURSING NOTE
Pt napping in bed throughout the morning  Declined breakfast and morning meds  Unable to specify a reason for refusing meds except "I don't want to" take them  Pt OOB for lunch and then sitting in tv room  Appears drowsy and falling asleep during interaction  Reports feeling depressed and SI to cut wrist   Denies intent to do so and willing to alert staff if feeling unsafe  When encouraged to shower pt states "I got one last night"  Appears disheveled

## 2022-07-29 NOTE — ASSESSMENT & PLAN NOTE
 Admission labs pending   Vitals stable    UDS negative   COVID-19 negative   EKG pending    Medically stable for continued inpatient psychiatric treatment based on available results

## 2022-07-29 NOTE — NURSING NOTE
Pt resting in bed, woken by staff for assessment, pt falling asleep during interview  Few minutes later pt awake for Dinner  Pt returned to nurse's station with staff approximately [phone] with c/o thoughts to harm self  Pt was sitting at nurse's station and started to bite self on Right forearm  Pt left indentations on skin, no break in skin  Pt cooperative with taking evening dose of Haldol following dinner  Initially reported elevated depression, anxiety, later reports   Staff monitoring pt closely and continuing with q7 minutes checks and hourly rounding   JO notified, after discussion with provider, no new orders

## 2022-07-29 NOTE — NURSING NOTE
Patient visible in milieu, encouraged to shower  Declines at this time  Reports that she will shower in the morning  Denies suicidal and homicidal ideations at this time

## 2022-07-29 NOTE — CONSULTS
[address] 1970, 47 y o  female MRN: [id number]  Unit/Bed#: [facility] 254-01 Encounter: [id number]  Primary Care Provider: Berenice Martin MD   Date and time admitted to hospital: 7/28/2022  2:33 PM    Inpatient consult for Medical Clearance for [address] patient  Consult performed by: lOive Chan PA-C  Consult ordered by: Jeanette Meza MD          Medical clearance for psychiatric admission  Assessment & Plan  · Admission labs pending  · Vitals stable   · UDS negative  · COVID-19 negative  · EKG pending   · Medically stable for continued inpatient psychiatric treatment based on available results     Hyperlipidemia  Assessment & Plan  · Continue home Atorvastatin 40 mg daily + ASA 81 mg daily  · Lipid panel pending     History of non-suicidal self-harm  Assessment & Plan  · Patient with high risk of self-harm, reports using plastic knife to cut her forearms due to suicidal thoughts and auditory hallucinations prior to admission  · Patient with left-sided medial forearm linear, superficial abrasions without evidence of infection  · Picture in chart  · Local wound care/cleaning and supportive care     Seizures ([address] )  Assessment & Plan  · Continue home depakote 2500 mg HS  · Outpatient follow up with neurology     Primary hypertension  Assessment & Plan  · Continue home lisinopril 20 mg daily  · BP stable on admission (-140s)     * Borderline personality disorder [facility])  Assessment & Plan  Treatment per psychiatry        Counseling / Coordination of Care Time: 30 minutes  Greater than 50% of total time spent on patient counseling and coordination of care      Collaboration of Care:  Were Recommendations Directly Discussed with Primary Treatment Team? - No      History of Present Illness:     Alex Roy is a 47 y o  female with PMH of cognitive impairment, bipolar disorder, PTSD, obesity, HTN, HLD, seizures, and history of frequent U admissions (last 1-2 months ago) who is originally admitted to the psychiatry service due to [facility] and self harm behaviors  We are consulted for medical clearance for admission to [address] [place] [address] and treatment of underlying psychiatric illness       Patient should continue all prior to admission medications as prescribed by primary care provider/outpatient specialists  Patient is minimally cooperative during interview as she states "I am sleeping" however she does deny smoking, drinking or drug use  Available admission lab work and vitals are acceptable  Patient denies full ROS with exception of "sometimes I have left rib and back pain", which she states has been occurring "for a long time"  Patient appears medically stable for inpatient psychiatric treatment at this time          Review of Systems:     Review of Systems   Constitutional: Negative for chills and fever  HENT: Negative for congestion and sore throat  Eyes: Negative for visual disturbance  Respiratory: Negative for cough, chest tightness, shortness of breath and wheezing  Cardiovascular: Negative for chest pain and palpitations  Gastrointestinal: Negative for abdominal pain, constipation, diarrhea, nausea and vomiting  Genitourinary: Negative for difficulty urinating, dysuria, frequency and urgency  Musculoskeletal: Positive for back pain and myalgias  Negative for arthralgias  Skin: Positive for rash and wound  Neurological: Negative for dizziness, light-headedness and headaches     All other systems reviewed and are negative         Past Medical and Surgical History:      Medical History        Past Medical History:   Diagnosis Date    Anxiety      Bipolar 1 disorder ([facility] )      Bipolar affective disorder, depressed, severe ([facility] ) 10/10/2021    [facility]      CAD (coronary artery disease)      Cognitive impairment      Depression      Dyslipidemia      GERD (gastroesophageal reflux disease)      Hypertension      Obesity      Psychiatric disorder      Psychiatric illness      PTSD (post-traumatic stress disorder)      Schizoaffective disorder (HCC)      [facility] ([facility] )              Surgical History         Past Surgical History:   Procedure Laterality Date    APPENDECTOMY         PATIENT DENIES HAVING APPENDIX REMOVED    CHOLECYSTECTOMY        FOOT SURGERY Right              Meds/Allergies:     PTA meds:   Prior to Admission Medications   Prescriptions Last Dose Informant Patient Reported? Taking? D-1000 Extra Strength 25 MCG (1000 UT) tablet     Yes No   aspirin 81 mg chewable tablet     No No   Sig: Chew 1 tablet (81 mg total) daily   atorvastatin (LIPITOR) 40 mg tablet     No No   Sig: Take 1 tablet (40 mg total) by mouth daily with dinner   divalproex sodium (DEPAKOTE ER) 500 mg 24 hr tablet     No No   Sig: Take 5 tablets (2,500 mg total) by mouth daily at bedtime   haloperidol (HALDOL) 10 mg tablet     No No   Sig: Take 1 tablet (10 mg total) by mouth 2 (two) times a day Take 1 tablet in the morning and 1 tablet before bedtime   levOCARNitine (CARNITOR) 330 MG tablet     Yes No   lisinopril (ZESTRIL) 20 mg tablet     No No   Sig: Take 1 tablet (20 mg total) by mouth daily   lurasidone (Latuda) 40 mg tablet     Yes No   Sig: Take 40 mg by mouth 2 (two) times a day   melatonin 3 mg     Yes No   mirtazapine (REMERON) 45 MG tablet     No No   Sig: Take 1 tablet (45 mg total) by mouth daily at bedtime   prazosin (MINIPRESS) 2 mg capsule     No No   Sig: Take 1 capsule (2 mg total) by mouth daily at bedtime   senna-docusate sodium (SENOKOT S) 8 6-50 mg per tablet     No No   Sig: Take 1 tablet by mouth 2 (two) times a day   ziprasidone (GEODON) 40 mg capsule     Yes No      Facility-Administered Medications: None         Allergies:          Allergies   Allergen Reactions    Fish Oil - Food Allergy Other (See Comments)       unknown    Fish-Derived Products - Food Allergy Hives         Social History:     Marital Status: Single     Substance Use History:   Social History          Substance and Sexual Activity   Alcohol Use Not Currently      Social History           Tobacco Use   Smoking Status Former Smoker   Smokeless Tobacco Never Used   Tobacco Comment     Pt stated "smokes when stressed"      Social History          Substance and Sexual Activity   Drug Use Not Currently         Family History:           Family History   Problem Relation Age of Onset    Kidney cancer Mother      Cerebral aneurysm Father           Physical Exam:      Vitals:   Blood Pressure: 128/66 (07/29/22 1119)  Pulse: 80 (07/29/22 1119)  Temperature: 99 1 °F (37 3 °C) (07/29/22 1119)  Temp Source: Tympanic (07/29/22 1119)  Respirations: 16 (07/29/22 1119)  Height: 5' 4" (162 6 cm) (07/28/22 1448)  Weight - Scale: 123 kg (271 lb) (Waist: 42") (07/28/22 1448)  SpO2: 96 % (07/29/22 1119)     Physical Exam  Vitals and nursing note reviewed  Constitutional:       General: She is not in acute distress  Appearance: She is not ill-appearing  Comments: Patient intermittently falls asleep during interview and exam, lying flat on back in bed  Awakes to voice and touch   HENT:      Head: Normocephalic and atraumatic  Nose: Nose normal    Eyes:      General: No scleral icterus  Right eye: No discharge  Left eye: No discharge  Cardiovascular:      Rate and Rhythm: Normal rate and regular rhythm  Heart sounds: Normal heart sounds  No murmur heard  Pulmonary:      Effort: Pulmonary effort is normal  No respiratory distress  Breath sounds: Normal breath sounds  No wheezing, rhonchi or rales  Abdominal:      General: Bowel sounds are normal       Palpations: Abdomen is soft  Tenderness: There is no abdominal tenderness  There is no guarding  Musculoskeletal:      Right lower leg: No edema  Left lower leg: No edema  Skin:     General: Skin is warm and dry        Comments: Erythematous linear lacerations on L medial forearm without abscess, cellulitis, or active bleeding    Neurological:      Mental Status: She is alert and oriented to person, place, and time  Psychiatric:         Mood and Affect: Mood normal          Behavior: Behavior normal             Additional Data:      Lab Results: I have personally reviewed pertinent reports                              Lab Results   Component Value Date/Time     HGBA1C 4 9 07/05/2022 06:39 AM     HGBA1C 4 8 12/31/2021 07:22 AM     HGBA1C 4 8 08/31/2021 07:10 AM     HGBA1C 4 7 05/25/2021 07:10 AM             EKG, Pathology, and Other Studies Reviewed on Admission:   · EKG pending      ** Please Note: This note has been constructed using a voice recognition system   **

## 2022-07-29 NOTE — NURSING NOTE
Pt agreeable to ordered blood draw; attempts x2 technicians unsuccessful  Pt cooperative with both attempts  Pt encouraged to focus on hydration for re-attempt at another time

## 2022-07-29 NOTE — ASSESSMENT & PLAN NOTE
· Patient with high risk of self-harm, reports using plastic knife to cut her forearms due to suicidal thoughts and auditory hallucinations prior to admission  · Patient with left-sided medial forearm linear, superficial abrasions without evidence of infection  · Picture in chart  · Local wound care/cleaning and supportive care

## 2022-07-29 NOTE — MALNUTRITION/BMI
This medical record reflects one or more clinical indicators suggestive of malnutrition and/or morbid obesity  BMI Findings:  Adult BMI Classifications: Morbid Obesity 45-49 9        Body mass index is 46 52 kg/m²  Treat with Cardiac Diet  See Nutrition note dated 7/29/22 for additional details  Completed nutrition assessment is viewable in the nutrition documentation

## 2022-07-29 NOTE — TREATMENT TEAM
07/29/22 1230   Activity/Group Checklist   Group Life Skills  (mindfulness)   Attendance Attended   Attendance Duration (min) 16-30   Interactions Did not interact     Pt was in day room when group started  She looked to be half asleep when this therapist approached her  She stated "I don't feel well," and when asked if it was physically, she said "no mentally " She seemed to have fallen asleep, and when peers encouraged her to join, she shook her head no  Staff provided her with water

## 2022-07-29 NOTE — PLAN OF CARE
Problem: Ineffective Coping  Goal: Participates in unit activities  Description: Interventions:  - Provide therapeutic environment   - Provide required programming   - Redirect inappropriate behaviors   Outcome: Not Progressing     Problem: DISCHARGE PLANNING  Goal: Discharge to home or other facility with appropriate resources  Description: INTERVENTIONS:  - Identify barriers to discharge w/patient and caregiver  - Arrange for needed discharge resources and transportation as appropriate  - Identify discharge learning needs (meds, wound care, etc )  - Arrange for interpretive services to assist at discharge as needed  - Refer to Case Management Department for coordinating discharge planning if the patient needs post-hospital services based on physician/advanced practitioner order or complex needs related to functional status, cognitive ability, or social support system  Outcome: Progressing     Problem: SELF HARM/SUICIDALITY  Goal: Will have no self-injury during hospital stay  Description: INTERVENTIONS:  - Q 15 MINUTES: Routine safety checks  - Q WAKING SHIFT & PRN: Assess risk to determine if routine checks are adequate to maintain patient safety  - Encourage patient to participate actively in care by formulating a plan to combat response to suicidal ideation, identify supports and resources  Outcome: Progressing     Problem: DEPRESSION  Goal: Will be euthymic at discharge  Description: INTERVENTIONS:  - Administer medication as ordered  - Provide emotional support via 1:1 interaction with staff  - Encourage involvement in milieu/groups/activities  - Monitor for social isolation  Outcome: Progressing     Problem: PSYCHOSIS  Goal: Will report no hallucinations or delusions  Description: Interventions:  - Administer medication as  ordered  - Every waking shifts and PRN assess for the presence of hallucinations and or delusions  - Assist with reality testing to support increasing orientation  - Assess if patient's hallucinations or delusions are encouraging self-harm or harm to others and intervene as appropriate  Outcome: Progressing     Problem: ANXIETY  Goal: Will report anxiety at manageable levels  Description: INTERVENTIONS:  - Administer medication as ordered  - Teach and encourage coping skills  - Provide emotional support  - Assess patient/family for anxiety and ability to cope  Outcome: Progressing  Goal: By discharge: Patient will verbalize 2 strategies to deal with anxiety  Description: Interventions:  - Identify any obvious source/trigger to anxiety  - Staff will assist patient in applying identified coping technique/skills  - Encourage attendance of scheduled groups and activities  Outcome: Progressing

## 2022-07-29 NOTE — NURSING NOTE
Staff attempted to wake pt for breakfast, pt refused breakfast  Writer attempted twice to administer medications, pt refused  First attempt pt stated being too tired   Second attempted pt refused stating, "I don't want to "

## 2022-07-29 NOTE — PROGRESS NOTES
[address]  Progress Note - Pennye Major 1970, 47 y o  female MRN: [id number]  Unit/Bed#: [facility] 254-01 Encounter: [id number]  Primary Care Provider: Clair Hairston MD   Date and time admitted to hospital: 7/28/2022  2:33 PM    Medical clearance for psychiatric admission  Assessment & Plan   Admission labs pending   Vitals stable    UDS negative   COVID-19 negative   EKG pending    Medically stable for continued inpatient psychiatric treatment based on available results    Hyperlipidemia  Assessment & Plan  · Continue home Atorvastatin 40 mg daily + ASA 81 mg daily  · Lipid panel pending    History of non-suicidal self-harm  Assessment & Plan  · Patient with high risk of self-harm, reports using plastic knife to cut her forearms due to suicidal thoughts and auditory hallucinations prior to admission  · Patient with left-sided medial forearm linear, superficial abrasions without evidence of infection  · Picture in chart  · Local wound care/cleaning and supportive care    Seizures ([facility] [address] )  Assessment & Plan  · Continue home depakote 2500 mg HS  · Outpatient follow up with neurology    Primary hypertension  Assessment & Plan  · Continue home lisinopril 20 mg daily  · BP stable on admission (-140s)    * Borderline personality disorder ([address] )  Assessment & Plan  Treatment per psychiatry      Counseling / Coordination of Care Time: 30 minutes  Greater than 50% of total time spent on patient counseling and coordination of care  Collaboration of Care: Were Recommendations Directly Discussed with Primary Treatment Team? - No     History of Present Illness:    Elizabeth Ruiz is a 47 y o  female with PMH of cognitive impairment, bipolar disorder, PTSD, obesity, HTN, HLD, seizures, and history of frequent [facility] admissions (last 1-2 months ago) who is originally admitted to the psychiatry service due to [facility] and self harm behaviors   We are consulted for medical clearance for admission to Behavioral Health Unit and treatment of underlying psychiatric illness  Patient should continue all prior to admission medications as prescribed by primary care provider/outpatient specialists  Patient is minimally cooperative during interview as she states "I am sleeping" however she does deny smoking, drinking or drug use  Available admission lab work and vitals are acceptable  Patient denies full ROS with exception of "sometimes I have left rib and back pain", which she states has been occurring "for a long time"  Patient appears medically stable for inpatient psychiatric treatment at this time  Review of Systems:    Review of Systems   Constitutional: Negative for chills and fever  HENT: Negative for congestion and sore throat  Eyes: Negative for visual disturbance  Respiratory: Negative for cough, chest tightness, shortness of breath and wheezing  Cardiovascular: Negative for chest pain and palpitations  Gastrointestinal: Negative for abdominal pain, constipation, diarrhea, nausea and vomiting  Genitourinary: Negative for difficulty urinating, dysuria, frequency and urgency  Musculoskeletal: Positive for back pain and myalgias  Negative for arthralgias  Skin: Positive for rash and wound  Neurological: Negative for dizziness, light-headedness and headaches  All other systems reviewed and are negative        Past Medical and Surgical History:     Past Medical History:   Diagnosis Date    Anxiety     [facility]     Bipolar affective disorder, depressed, severe ([facility] ) 10/10/2021    Borderline personality disorder ([facility] )     CAD (coronary artery disease)     Cognitive impairment     Depression     Dyslipidemia     GERD (gastroesophageal reflux disease)     Hypertension     Obesity     Psychiatric disorder     Psychiatric illness     PTSD (post-traumatic stress disorder)     Schizoaffective disorder (HCC)     Seizure ([facility] )        Past Surgical History: Procedure Laterality Date    APPENDECTOMY      PATIENT DENIES HAVING APPENDIX REMOVED    CHOLECYSTECTOMY      FOOT SURGERY Right        Meds/Allergies:    PTA meds:   Prior to Admission Medications   Prescriptions Last Dose Informant Patient Reported? Taking? D-1000 Extra Strength 25 MCG (1000 UT) tablet   Yes No   aspirin 81 mg chewable tablet   No No   Sig: Chew 1 tablet (81 mg total) daily   atorvastatin (LIPITOR) 40 mg tablet   No No   Sig: Take 1 tablet (40 mg total) by mouth daily with dinner   divalproex sodium (DEPAKOTE ER) 500 mg 24 hr tablet   No No   Sig: Take 5 tablets (2,500 mg total) by mouth daily at bedtime   haloperidol (HALDOL) 10 mg tablet   No No   Sig: Take 1 tablet (10 mg total) by mouth 2 (two) times a day Take 1 tablet in the morning and 1 tablet before bedtime   levOCARNitine (CARNITOR) 330 MG tablet   Yes No   lisinopril (ZESTRIL) 20 mg tablet   No No   Sig: Take 1 tablet (20 mg total) by mouth daily   lurasidone (Latuda) 40 mg tablet   Yes No   Sig: Take 40 mg by mouth 2 (two) times a day   melatonin 3 mg   Yes No   mirtazapine (REMERON) 45 MG tablet   No No   Sig: Take 1 tablet (45 mg total) by mouth daily at bedtime   prazosin (MINIPRESS) 2 mg capsule   No No   Sig: Take 1 capsule (2 mg total) by mouth daily at bedtime   senna-docusate sodium (SENOKOT S) 8 6-50 mg per tablet   No No   Sig: Take 1 tablet by mouth 2 (two) times a day   ziprasidone (GEODON) 40 mg capsule   Yes No      Facility-Administered Medications: None       Allergies:    Allergies   Allergen Reactions    Fish Oil - Food Allergy Other (See Comments)     unknown    Fish-Derived Products - Food Allergy Hives       Social History:     Marital Status: Single    Substance Use History:   Social History     Substance and Sexual Activity   Alcohol Use Not Currently     Social History     Tobacco Use   Smoking Status Former Smoker   Smokeless Tobacco Never Used   Tobacco Comment    Pt stated "smokes when stressed" Social History     Substance and Sexual Activity   Drug Use Not Currently       Family History:    Family History   Problem Relation Age of Onset    Kidney cancer Mother     Cerebral aneurysm Father        Physical Exam:     Vitals:   Blood Pressure: 128/66 (07/29/22 1119)  Pulse: 80 (07/29/22 1119)  Temperature: 99 1 °F (37 3 °C) (07/29/22 1119)  Temp Source: Tympanic (07/29/22 1119)  Respirations: 16 (07/29/22 1119)  Height: 5' 4" (162 6 cm) (07/28/22 1448)  Weight - Scale: 123 kg (271 lb) (Waist: 42") (07/28/22 1448)  SpO2: 96 % (07/29/22 1119)    Physical Exam  Vitals and nursing note reviewed  Constitutional:       General: She is not in acute distress  Appearance: She is not ill-appearing  Comments: Patient intermittently falls asleep during interview and exam, lying flat on back in bed  Awakes to voice and touch   HENT:      Head: Normocephalic and atraumatic  Nose: Nose normal    Eyes:      General: No scleral icterus  Right eye: No discharge  Left eye: No discharge  Cardiovascular:      Rate and Rhythm: Normal rate and regular rhythm  Heart sounds: Normal heart sounds  No murmur heard  Pulmonary:      Effort: Pulmonary effort is normal  No respiratory distress  Breath sounds: Normal breath sounds  No wheezing, rhonchi or rales  Abdominal:      General: Bowel sounds are normal       Palpations: Abdomen is soft  Tenderness: There is no abdominal tenderness  There is no guarding  Musculoskeletal:      Right lower leg: No edema  Left lower leg: No edema  Skin:     General: Skin is warm and dry  Comments: Erythematous linear lacerations on L medial forearm without abscess, cellulitis, or active bleeding    Neurological:      Mental Status: She is alert and oriented to person, place, and time     Psychiatric:         Mood and Affect: Mood normal          Behavior: Behavior normal          Additional Data:     Lab Results: I have personally reviewed pertinent reports  Lab Results   Component Value Date/Time    HGBA1C 4 9 07/05/2022 06:39 AM    HGBA1C 4 8 12/31/2021 07:22 AM    HGBA1C 4 8 08/31/2021 07:10 AM    HGBA1C 4 7 05/25/2021 07:10 AM           EKG, Pathology, and Other Studies Reviewed on Admission:   · EKG pending     ** Please Note: This note has been constructed using a voice recognition system   **

## 2022-07-30 PROCEDURE — 99232 SBSQ HOSP IP/OBS MODERATE 35: CPT | Performed by: PSYCHIATRY & NEUROLOGY

## 2022-07-30 RX ADMIN — Medication 3 MG: at 21:23

## 2022-07-30 RX ADMIN — PRAZOSIN HYDROCHLORIDE 2 MG: 1 CAPSULE ORAL at 21:23

## 2022-07-30 RX ADMIN — CHOLECALCIFEROL (VITAMIN D3) 10 MCG (400 UNIT) TABLET 400 UNITS: at 09:37

## 2022-07-30 RX ADMIN — ATORVASTATIN CALCIUM 40 MG: 40 TABLET, FILM COATED ORAL at 17:18

## 2022-07-30 RX ADMIN — DIVALPROEX SODIUM 2500 MG: 500 TABLET, EXTENDED RELEASE ORAL at 21:23

## 2022-07-30 RX ADMIN — LISINOPRIL 20 MG: 20 TABLET ORAL at 09:37

## 2022-07-30 RX ADMIN — ASPIRIN 81 MG CHEWABLE TABLET 81 MG: 81 TABLET CHEWABLE at 09:37

## 2022-07-30 RX ADMIN — MIRTAZAPINE 45 MG: 15 TABLET, FILM COATED ORAL at 21:23

## 2022-07-30 RX ADMIN — SENNOSIDES AND DOCUSATE SODIUM 1 TABLET: 50; 8.6 TABLET ORAL at 17:18

## 2022-07-30 RX ADMIN — SENNOSIDES AND DOCUSATE SODIUM 1 TABLET: 50; 8.6 TABLET ORAL at 09:37

## 2022-07-30 RX ADMIN — HALOPERIDOL 10 MG: 10 TABLET ORAL at 17:18

## 2022-07-30 RX ADMIN — HALOPERIDOL 10 MG: 10 TABLET ORAL at 09:37

## 2022-07-30 NOTE — NURSING NOTE
Pt declined breakfast and slept majority of the morning, did come out for lunch and has been watching television with peers since this time  Pt states "Im not doing good " Expresses elevated anxiety and depression with fleeting thoughts to self harm by biting  Pt states she plans on seeking staff if urges worsen  Pt currently denies AVH  Remains social with select peers  States she slept well  Denies any questions at this time

## 2022-07-30 NOTE — PROGRESS NOTES
Progress Note - Behavioral Health   Leydi Alonzo 47 y o  female MRN: [id number]  Unit/Bed#: [facility] 254-01 Encounter: [id number]    Assessment/Plan   Principal Problem:    Borderline personality disorder ([facility] )  Active Problems:    Primary hypertension    Hyperlipidemia    Medical clearance for psychiatric admission    [facility]ures ([facility] )    History of non-suicidal self-harm  Patient is interviewed in this provider's office after being found pacing gradually in the hallway  Does appear bizarre and stares around the room on occasion  Reports high degree of suicidal ideation with plan to jump in front of a car if she were discharged  Does report that she feels safe currently on the unit  Denies any homicidal ideations  Does report auditory hallucinations of voices telling her that you are no good  You're fat, ugly, and no one wants to be around you    Denies any visual hallucinations  Reports that her nightmares are refractory to current dose of prazosin and further consideration should be given to increase this dose on Monday provided her blood pressure metrics improved as her diastolic is currently 62  Patient was unable to acquire lab work this morning including valproic acid level which is needed for further titration which could be indicated depending on this level as patient's mood continues be unstable  Patient would like to discuss moving from Haldol Thorazine with primary team next week  Does report that sleep and appetite are improved on Remeron and melatonin which will continued without change this time  Patient would like to discuss being taken off cardiac diet and informed her to speak Internal Medicine about this to which she voiced  Will continue to monitor  Recommended Treatment:   1) Continue Depakote ER 2,500 mg QHS for mood stabilization  Could not obtain VPA today and will need to retry for tomorrow as this is needed for titration    2) Continue Haldol 10 mg PO BID for mood stabilization, psychosis, and agitation  Patient would like discuss moving to Thorazine with primary team   3) Continue Remeron 45 mg PO QHS for sleep, appetite, mood, and anxiety  4) Continue Prazosin 2 mg PO QHS for nightmares  Consider further increases come Monday provided blood pressure metics improve  5) Continue Melatonin 3 mg PO QHS for sleeping difficulties  6) Patient wishes to speak with internal medicine about being taken off cardiac diet  Continue with group therapy, milieu therapy and occupational therapy  Continue frequent safety checks and vitals per unit protocol  Case discussed with treatment team   Risks, benefits and possible side effects of Medications: Risks, benefits, and possible side effects of medications have been explained to the patient, who verbalizes understanding    ------------------------------------------------------------    Subjective: Per nursing report, Leydi has been cooperative on the unit and compliant with medications  Today, Leydi is consenting for safety on the unit  She reports that her current mood is not too good   reports that she wishes she was dead and has active thoughts of wanting to jump in front of a car or slit her wrists/throat  Additionally reports auditory hallucinations telling her you are no good  You're fat, ugly, and no one wants to be around you    Denies any visual hallucinations or homicidal ideations  Does report that there was an inability to get her blood this morning as she has small veins  Denies any side effects to medication regimen or other physical symptoms at this time  Reports that she continues to have nightmares which are refractory to current dose of prazosin and was in agreement with increasing this later next week if they remain refractory  Patient wishes to also discuss switching from Haldol to Thorazine as this is been helpful in the past and was agreeable to discussing this next week with primary psychiatric team as well    Per nursing staff, patient is being asked to be placed on a one-to-one which has not been granted to her  Continues to report high level of suicidal ideation and command auditory hallucinations  Did have superficial cut with plastic knife and threatened to use utensils in this manner  Patient has been placed on cardiac diet  Progress Toward Goals: unchanged    Psychiatric Review of Systems:  Behavior over the last 24 hours: unchanged  Sleep: improving  Appetite: adequate  Medication side effects: none verbalized  ROS: Complete review of systems is negative except as noted above  Vital signs in last 24 hours:  Temp:  [97 5 °F (36 4 °C)-98 8 °F (37 1 °C)] 98 8 °F (37 1 °C)  HR:  [78-80] 80  Resp:  [17-18] 18  BP: (117-118)/(62-67) 118/62    Mental Status Exam:  Appearance:  alert, poor eye contact, appears older than stated age, disheveled, obese, messy grey hair and many zits on face   Behavior:  guarded, evasive, sitting comfortably and withdrawn and distant in thought   Motor: no abnormal movements, psychomotor agitation and normal gait and balance   Speech:  spontaneous, slow, normal volume, scant and coherent   Mood:  "Not too good"   Affect:  mood-congruent, blunted, depressed, dysphoric and anxious   Thought Process:  disorganized, illogical, circumstantial, normal rate of thoughts   Thought Content: mild paranoia   Perceptual disturbances: auditory hallucinations "saying you're no good   Your're fat, ugly, and no one wants to be around you ", visual hallucinations none and appears to be responding to internal stimuli   Risk Potential: No active homicidal ideation, Suicidal Ideation with plan to jump in front of care or slit wrists/throat, Potential for aggression due to acute psychosis   Cognition: oriented to self and situation, memory impaired due to psychosis, appears to be below average intelligence, impaired abstract reasoning, attention span appeared shorter than expected for age and cognition not formally tested   Insight:  Limited   Judgment: Limited     Current Medications:  Current Facility-Administered Medications   Medication Dose Route Frequency Provider Last Rate    acetaminophen  650 mg Oral Q6H PRN Melissa Burton MD      acetaminophen  650 mg Oral Q4H PRN Melissa Burton MD      acetaminophen  975 mg Oral Q6H PRN Melissa Burton MD      aluminum-magnesium hydroxide-simethicone  30 mL Oral Q4H PRN Melissa Burton MD      artificial tear  1 application Both Eyes E4R PRN Melissa Burton MD      aspirin  81 mg Oral Daily Elissa Sarah MD      atorvastatin  40 mg Oral Daily With Sylvain Littlejohn MD      haloperidol lactate  2 5 mg Intramuscular Q4H PRN Max 4/day Melissa Burton MD      And    LORazepam  1 mg Intramuscular Q4H PRN Max 4/day Melissa Burton MD      And    benztropine  0 5 mg Intramuscular Q4H PRN Max 4/day Melissa Burton MD      haloperidol lactate  5 mg Intramuscular Q4H PRN Max 4/day Melissa Burton MD      And    LORazepam  2 mg Intramuscular Q4H PRN Max 4/day Melissa Burton MD      And    benztropine  1 mg Intramuscular Q4H PRN Max 4/day Melissa Burton MD      benztropine  1 mg Oral Q4H PRN Max 6/day Melissa Burton MD      bisacodyl  10 mg Rectal Daily PRN Lisa Suarez MD      cholecalciferol  400 Units Oral Daily Elissa Sarah MD      hydrOXYzine HCL  50 mg Oral Q6H PRN Max 4/day MD Jero Hillman diphenhydrAMINE  50 mg Intramuscular Q6H PRN Melissa Burton MD      divalproex sodium  2,500 mg Oral HS Elissa Sarah MD      haloperidol  1 mg Oral Q6H PRN Melissa Burton MD      haloperidol  10 mg Oral BID Elissa Sarah MD      haloperidol  2 5 mg Oral Q4H PRN Max 4/day Melissa Burton MD      haloperidol  5 mg Oral Q4H PRN Max 4/day Melissa Burton MD      hydrOXYzine HCL  100 mg Oral Q6H PRN Max 4/day Melissa Burton MD      Or    LORazepam  2 mg Intramuscular Q6H PRN Linda Gasca MD      hydrOXYzine HCL  25 mg Oral Q6H PRN Max 4/day Linda Gasca MD      lisinopril  20 mg Oral Daily Jelly Colon MD      melatonin  3 mg Oral HS Linda Gasca MD      mirtazapine  45 mg Oral HS Jelly Colon MD      polyethylene glycol  17 g Oral Daily PRN Abdullahi Urias MD      prazosin  2 mg Oral HS Jelly Colon MD      senna-docusate sodium  1 tablet Oral Daily PRN Linda Gasca MD      senna-docusate sodium  1 tablet Oral BID Jelly Colon MD         Behavioral Health Medications: all current active meds have been reviewed  Changes as in plan section above  Laboratory results:  I have personally reviewed all pertinent laboratory/tests results  No results found for this or any previous visit (from the past 48 hour(s))       Mary Faulkner DO

## 2022-07-30 NOTE — TREATMENT TEAM
07/30/22 0900   Team Meeting   Meeting Type Daily Rounds   Team Members Present   Team Members Present Physician;Nurse   Physician Team Member Dr Donna Quigley Team Member Tavon Quintana   Patient/Family Present   Patient Present No   Patient's Family Present No     AM rounds- pt reports having CAH to harm self  Pt observed attempting to bite self  Sat by nurses station until feeling safe  Refused AM medications  Attended 1 group  Slept well

## 2022-07-31 PROCEDURE — 99232 SBSQ HOSP IP/OBS MODERATE 35: CPT | Performed by: PSYCHIATRY & NEUROLOGY

## 2022-07-31 RX ADMIN — ATORVASTATIN CALCIUM 40 MG: 40 TABLET, FILM COATED ORAL at 17:01

## 2022-07-31 RX ADMIN — MIRTAZAPINE 45 MG: 15 TABLET, FILM COATED ORAL at 21:34

## 2022-07-31 RX ADMIN — HYDROXYZINE HYDROCHLORIDE 50 MG: 50 TABLET, FILM COATED ORAL at 23:10

## 2022-07-31 RX ADMIN — HALOPERIDOL 10 MG: 10 TABLET ORAL at 17:01

## 2022-07-31 RX ADMIN — SENNOSIDES AND DOCUSATE SODIUM 1 TABLET: 50; 8.6 TABLET ORAL at 08:45

## 2022-07-31 RX ADMIN — DIVALPROEX SODIUM 2500 MG: 500 TABLET, EXTENDED RELEASE ORAL at 21:37

## 2022-07-31 RX ADMIN — LISINOPRIL 20 MG: 20 TABLET ORAL at 08:45

## 2022-07-31 RX ADMIN — PRAZOSIN HYDROCHLORIDE 2 MG: 1 CAPSULE ORAL at 21:34

## 2022-07-31 RX ADMIN — HALOPERIDOL 10 MG: 10 TABLET ORAL at 08:45

## 2022-07-31 RX ADMIN — CHOLECALCIFEROL (VITAMIN D3) 10 MCG (400 UNIT) TABLET 400 UNITS: at 08:45

## 2022-07-31 RX ADMIN — SENNOSIDES AND DOCUSATE SODIUM 1 TABLET: 50; 8.6 TABLET ORAL at 17:01

## 2022-07-31 RX ADMIN — ASPIRIN 81 MG CHEWABLE TABLET 81 MG: 81 TABLET CHEWABLE at 08:45

## 2022-07-31 NOTE — NURSING NOTE
Pt reports she slept well overnight  Reports having CAH to kill self however pt denies plan and reports feeling safe on the unit  Pt states "I'm trying to just ignore them " pt however does report having urges to bite self however has been using the television to distract herself from doing so  Pt reports she will seek staff if urges worsen  Pt social with select peers  Denies any questions

## 2022-07-31 NOTE — NURSING NOTE
Leydi is calm upon approach and visualized resting in her room and napping intermittently this evening  Leydi reports that her sleep is improved since time of admission  Patient declined to attend evening wrap-up group, despite encouragement from staff, but did get out of bed for snack  Leydi denies current SI/HI/AH/VH, but does report intermittent, fleeting thoughts to either scratch or bite herself, though patient has been able to talk with staff to avoid engaging in these behaviors  Elydi reports feeling safe currently in the hospital  Patient also reports dull hip pain, which she reports is chronic in nature, denies need for PRN pain medication, declined offered heat pack  Leydi reports, "I'm not hearing any voices today, just my own thoughts " Leydi remains compliant with scheduled medications, encouraged to attend groups  Patient encouraged to seek staff with any concerns, verbalized understanding  Q7 minute observational status to continue for promotion of patient safety

## 2022-07-31 NOTE — PROGRESS NOTES
Progress Note - Behavioral Health   Leydi Alonzo 47 y o  female MRN: [id number]  Unit/Bed#: [facility] 254-01 Encounter: [id number]    Assessment/Plan   Principal Problem:    Borderline personality disorder ([facility] 75 )  Active Problems:    Primary hypertension    Hyperlipidemia    Medical clearance for psychiatric admission    Seizures ([facility] 75 )    History of non-suicidal self-harm  Patient was interviewed in her room reluctantly but with her consent  Originally wanted to not have interview but was redirectable when explanation of refusal and what this could mean for her management was explained  Patient continues to appear very depressed and withdrawn  Disinterested in her care  Perseverates on wanting to be taken off her cardiac diet was again encouraged to talk to Internal Medicine about this  Does appear somewhat lethargic and will discontinue patient's melatonin as she reports her sleep is improved  Does continue to have nightmares would likely benefit from increased dose of prazosin, however patient's blood pressure particularly diastolic numbers are prohibitive currently  Will need to obtain valproic acid level for consideration of further titration and management of her Depakote which will be continued at current dose without change  Will also continue patient on Haldol and defer to primary team on whether she should be switched to Thorazine  Will continue Remeron at current dose without change for help with appetite, mood, and anxiety  Does continue to endorse unchanged level of suicidal ideations with plans to overdose or cut her wrist   Denies any changes in homicidal ideations  Does report command auditory hallucinations of my own voice telling her to harm herself  Does not appear acutely agitated and this provider believes that Thorazine would be overly sedating given her current presentation  Will continue to monitor  Recommended Treatment:   1) Continue Depakote ER 2,500 mg QHS for mood stabilization  VPA was reschedule for tomorrow with other labs and will need to retry for titration purposes  2) Continue Haldol 10 mg PO BID for mood stabilization, psychosis, and agitation  [facility]   3) Continue Remeron 45 mg PO QHS for appetite, mood, and anxiety  4) Continue Prazosin 2 mg PO QHS for nightmares  Consider further increases come Monday provided blood pressure metics improve  Diastolic still too low  5) Patient wishes to speak with internal medicine about being taken off cardiac diet  6) Discontinue melatonin due to lethargy and adequate sleep  Continue with group therapy, milieu therapy and occupational therapy  Continue frequent safety checks and vitals per unit protocol  Case discussed with treatment team   Risks, benefits and possible side effects of Medications: Risks, benefits, and possible side effects of medications have been explained to the patient, who verbalizes understanding    ------------------------------------------------------------    Subjective: Per nursing report, Leydi has been cooperative on the unit and compliant with medications  Today, Leydi is consenting for safety on the unit  She reports that her current mood is not too good    States that she continues to have high levels of depression and anxiety  Reports continued suicidal ideations unchanged since yesterday with planned overdose or cut her wrist   Denies any homicidal ideations  Denies any visual hallucinations but does admit to auditory command hallucinations of my own voice telling her to harm herself  Denies any side effects to medication or other physical symptoms at this time  Indicates that her sleep is up and down and her appetite is fair which is an improvement for her  Continues to have nightmares that interfere with her sleep at same level  Per nursing staff, patient has been social at times watching TV    Continues to have command auditory hallucinations telling her to harm herself  Did continue to have thoughts of biting herself but has had no acute behavior  Redirectable to positive thinking pattern  States that yesterday she did not have a good day      Progress Toward Goals: unchanged    Psychiatric Review of Systems:  Behavior over the last 24 hours: unchanged  Sleep: fluctuating  Appetite: decreased from baseline, fair  Medication side effects: none verbalized  ROS: fatigue, Complete review of systems is negative except as noted above      Vital signs in last 24 hours:  Temp:  [98 °F (36 7 °C)-98 6 °F (37 °C)] 98 6 °F (37 °C)  HR:  [80-81] 80  Resp:  [16-18] 16  BP: (120-128)/(52-70) 123/58    Mental Status Exam:  Appearance:  alert, intermittant eye contact, appears stated age, disheveled, obese, messy grey hair and multiple zits on face   Behavior:  limited cooperativity, guarded, evasive and laying in bed   Motor: no abnormal movements, psychomotor retardation and Unable to assess   Speech:  spontaneous, slow, soft, scant and coherent   Mood:  "Not too good"   Affect:  mood-congruent, depressed, dysphoric, anxious and flat   Thought Process:  disorganized, illogical, circumstantial, normal rate of thoughts   Thought Content: mild paranoia   Perceptual disturbances: auditory hallucinations "my own voice telling me to harm myself", visual hallucinations none and appears to be responding to internal stimuli   Risk Potential: No active homicidal ideation, Suicidal Ideation with plan to OD or cut wrists, Low potential for aggression based on previous behavior   Cognition: oriented to self and situation, memory grossly intact, appears to be below average intelligence, impaired abstract reasoning, attention span appeared shorter than expected for age and cognition not formally tested   Insight:  Limited   Judgment: Limited     Current Medications:  Current Facility-Administered Medications   Medication Dose Route Frequency Provider Last Rate    acetaminophen  650 mg Oral Q6H PRN Zahraa Patel MD      acetaminophen  650 mg Oral Q4H PRN Zahraa Patel MD      acetaminophen  975 mg Oral Q6H PRN Zahraa Patel MD      aluminum-magnesium hydroxide-simethicone  30 mL Oral Q4H PRN Zahraa Patel MD      artificial tear  1 application Both Eyes A2K PRN Zahraa Patel MD      aspirin  81 mg Oral Daily Marilin Pichardo MD      atorvastatin  40 mg Oral Daily With Jeanette Cooks, MD      haloperidol lactate  2 5 mg Intramuscular Q4H PRN Max 4/day Zahraa Patel MD      And    LORazepam  1 mg Intramuscular Q4H PRN Max 4/day Zahraa Patel MD      And    benztropine  0 5 mg Intramuscular Q4H PRN Max 4/day Zahraa Patel MD      haloperidol lactate  5 mg Intramuscular Q4H PRN Max 4/day Zahraa Patel MD      And    LORazepam  2 mg Intramuscular Q4H PRN Max 4/day Zahraa Patel MD      And    benztropine  1 mg Intramuscular Q4H PRN Max 4/day Zahraa Patel MD      benztropine  1 mg Oral Q4H PRN Max 6/day Zahraa Patel MD      bisacodyl  10 mg Rectal Daily PRN August Maldonado MD      cholecalciferol  400 Units Oral Daily Marilin Pichardo MD      hydrOXYzine HCL  50 mg Oral Q6H PRN Max 4/day Zahraa Patel MD      Or   Andrew Pert diphenhydrAMINE  50 mg Intramuscular Q6H PRN Zahraa Patel MD      divalproex sodium  2,500 mg Oral HS Marilin Pichardo MD      haloperidol  1 mg Oral Q6H PRN Zahraa Patel MD      haloperidol  10 mg Oral BID Marilin Pichardo MD      haloperidol  2 5 mg Oral Q4H PRN Max 4/day Zahraa Patel MD      haloperidol  5 mg Oral Q4H PRN Max 4/day Zahraa Patel MD      hydrOXYzine HCL  100 mg Oral Q6H PRN Max 4/day Zahraa Patel MD      Or    LORazepam  2 mg Intramuscular Q6H PRN Zahraa Patel MD      hydrOXYzine HCL  25 mg Oral Q6H PRN Max 4/day Zahraa Patel MD      lisinopril  20 mg Oral Daily MD Andrew Luna Pert melatonin  3 mg Oral HS Jeanette Meza MD      mirtazapine  45 mg Oral HS Ace Geller MD      polyethylene glycol  17 g Oral Daily PRN Dk Kim MD      prazosin  2 mg Oral HS Ace Geller MD      senna-docusate sodium  1 tablet Oral Daily PRN MD Ben Bertrandna-docusate sodium  1 tablet Oral BID Ace Geller MD         Behavioral Health Medications: all current active meds have been reviewed  Changes as in plan section above  Laboratory results:  I have personally reviewed all pertinent laboratory/tests results  No results found for this or any previous visit (from the past 48 hour(s))       Mariella Larkin DO

## 2022-07-31 NOTE — NURSING NOTE
Leydi appears to have slept overnight, without interruption or periods of restlessness observed  Patient appears to still be sleeping now  Q7 minute safety checks to continue to promote patient safety

## 2022-07-31 NOTE — TREATMENT TEAM
07/31/22 0800   Team Meeting   Meeting Type Daily Rounds   Team Members Present   Team Members Present Physician;Nurse   Physician Team Member Dr Daniella Tinsley Team Member Cayla Cespedes   Patient/Family Present   Patient Present No   Patient's Family Present No     AM rounds- napping at times throughout the day, reports fleeting thoughts to bite self  CAH at times telling her to bite self  Pt social with peers later in the evening  Med-compliant  Slept well

## 2022-08-01 PROCEDURE — 86592 SYPHILIS TEST NON-TREP QUAL: CPT | Performed by: PSYCHIATRY & NEUROLOGY

## 2022-08-01 PROCEDURE — 99232 SBSQ HOSP IP/OBS MODERATE 35: CPT | Performed by: PHYSICIAN ASSISTANT

## 2022-08-01 RX ORDER — FLUOXETINE HYDROCHLORIDE 20 MG/1
20 CAPSULE ORAL DAILY
Status: DISCONTINUED | OUTPATIENT
Start: 2022-08-02 | End: 2022-08-03 | Stop reason: HOSPADM

## 2022-08-01 RX ORDER — FLUOXETINE 10 MG/1
10 CAPSULE ORAL DAILY
Status: COMPLETED | OUTPATIENT
Start: 2022-08-01 | End: 2022-08-01

## 2022-08-01 RX ADMIN — HYDROXYZINE HYDROCHLORIDE 100 MG: 50 TABLET, FILM COATED ORAL at 14:13

## 2022-08-01 RX ADMIN — DIVALPROEX SODIUM 2500 MG: 500 TABLET, EXTENDED RELEASE ORAL at 21:31

## 2022-08-01 RX ADMIN — HALOPERIDOL 10 MG: 10 TABLET ORAL at 17:28

## 2022-08-01 RX ADMIN — HALOPERIDOL 5 MG: 5 TABLET ORAL at 14:13

## 2022-08-01 RX ADMIN — SENNOSIDES AND DOCUSATE SODIUM 1 TABLET: 50; 8.6 TABLET ORAL at 09:06

## 2022-08-01 RX ADMIN — PRAZOSIN HYDROCHLORIDE 2 MG: 1 CAPSULE ORAL at 21:31

## 2022-08-01 RX ADMIN — FLUOXETINE 10 MG: 10 CAPSULE ORAL at 10:38

## 2022-08-01 RX ADMIN — HALOPERIDOL 10 MG: 10 TABLET ORAL at 09:06

## 2022-08-01 RX ADMIN — SENNOSIDES AND DOCUSATE SODIUM 1 TABLET: 50; 8.6 TABLET ORAL at 17:28

## 2022-08-01 RX ADMIN — ASPIRIN 81 MG CHEWABLE TABLET 81 MG: 81 TABLET CHEWABLE at 09:05

## 2022-08-01 RX ADMIN — MIRTAZAPINE 45 MG: 15 TABLET, FILM COATED ORAL at 21:31

## 2022-08-01 RX ADMIN — LISINOPRIL 20 MG: 20 TABLET ORAL at 09:05

## 2022-08-01 RX ADMIN — ATORVASTATIN CALCIUM 40 MG: 40 TABLET, FILM COATED ORAL at 16:55

## 2022-08-01 RX ADMIN — CHOLECALCIFEROL (VITAMIN D3) 10 MCG (400 UNIT) TABLET 400 UNITS: at 09:06

## 2022-08-01 RX ADMIN — BENZTROPINE MESYLATE 1 MG: 1 TABLET ORAL at 14:13

## 2022-08-01 NOTE — NURSING NOTE
Has been sleeping in bed since the onset of the shift, after given Atarax 50 mg  Po prn moderate anxiety (H=23) at 2310  Remains asleep at present

## 2022-08-01 NOTE — NURSING NOTE
Pt declined ordered blood draw this morning  Pt states they will be agreeable to draw tomorrow morning

## 2022-08-01 NOTE — NURSING NOTE
Leydi is pleasant upon approach and visible in the community  Patient is out of her room more this evening, when compared to previous shifts  Leydi was able to wash her clothes in her room and use unit dryer with encouragement  Patient encouraged to perform ADLs, which she completed prior to bed  Leydi remains compliant with scheduled medications and continues to report intermittent CAH, denies current SI/HI/VH  Patient reports having a "   pretty good day," is visualized in dayroom and is social with select peers and staff  Patient encouraged to seek staff with any questions and/or concerns, verbalized understanding  Q7 minute observational status to continue for promotion of patient safety

## 2022-08-01 NOTE — PROGRESS NOTES
Progress Note - Behavioral Health     Leydi Khan 47 y o  female MRN: [id number]   Unit/Bed#: [facility] 254-01 Encounter: [id number]    Behavior over the last 24 hours: unchanged  Leydi is a 60-year-old female with a history of bipolar disorder and borderline personality disorder who presents for psychiatric follow-up  Staff reports that she continued to try and bite her arm yesterday but was responsive to redirection  She is interviewed at bedside and endorses feeling depressed, hopeless and experiencing command auditory hallucinations to hurt herself, as well as negative comments such as you are ugly, and you are no good    She denies SI today  No HI and no VH  She is amenable to medication adjustments  Has no plans for today  Denies any additional concerns      Sleep: hypersomnia  Appetite: decreased  Medication side effects: No   ROS: all other systems are negative    Mental Status Evaluation:    Appearance:  disheveled   Behavior:  cooperative, calm   Speech:  scant, soft   Mood:  depressed   Affect:  blunted   Thought Process:  concrete, poverty of thought   Associations: concrete associations   Thought Content:  no overt delusions, negative thinking, negative thoughts   Perceptual Disturbances: no visual hallucinations, auditory hallucinations with commands to harm self, with derogatory comments, with negative comments and commenting on Leydi behavior   Risk Potential: Suicidal ideation - None at present  Homicidal ideation - None at present  Potential for aggression - Not at present   Sensorium:  oriented to person, place and time/date   Memory:  recent and remote memory grossly intact   Consciousness:  alert and awake   Attention/Concentration: attention span and concentration appear shorter than expected for age   Insight:  limited   Judgment: fair   Gait/Station: in bed   Motor Activity: no abnormal movements     Vital signs in last 24 hours:    Temp:  [97 5 °F (36 4 °C)-98 8 °F (37 1 °C)] 97 5 °F (36 4 °C)  HR:  [70-97] 71  Resp:  [15-18] 15  BP: (109-135)/(56-68) 120/56    Laboratory results: I have personally reviewed all pertinent laboratory/tests results    Results from the past 24 hours: No results found for this or any previous visit (from the past 24 hour(s))  Most Recent Labs:   Lab Results   Component Value Date    WBC 11 23 (H) 07/20/2022    RBC 4 02 07/20/2022    HGB 12 8 07/20/2022    HCT 38 7 07/20/2022     07/20/2022    RDW 14 0 07/20/2022    NEUTROABS 5 19 06/29/2022    SODIUM 134 (L) 07/20/2022    K 4 4 07/20/2022    CL 98 07/20/2022    CO2 27 07/20/2022    BUN 10 07/20/2022    CREATININE 0 78 07/20/2022    GLUC 99 07/20/2022    CALCIUM 9 2 07/20/2022    AST 24 07/20/2022    ALT 30 07/20/2022    ALKPHOS 72 07/20/2022    TP 7 7 07/20/2022    ALB 3 5 07/20/2022    TBILI 0 40 07/20/2022    CHOLESTEROL 169 04/28/2022    HDL 45 (L) 04/28/2022    TRIG 180 (H) 04/28/2022    LDLCALC 88 04/28/2022    NONHDLC 124 04/28/2022    VALPROICTOT 85 06/21/2022    [id number] 2 044 07/20/2022    PREGUR NEG 07/27/2022    PREGSERUM Negative 04/28/2022    RPR Non-Reactive 02/13/2022    HGBA1C 4 9 07/05/2022    EAG 94 07/05/2022       Progress Toward Goals: progressing    Assessment/Plan   Principal Problem:    Borderline personality disorder ([facility] )  Active Problems:    Primary hypertension    Hyperlipidemia    Medical clearance for psychiatric admission    Seizures ([facility] )    History of non-suicidal self-harm      Recommended Treatment:     Planned medication and treatment changes: All current active medications have been reviewed  Encourage group therapy, milieu therapy and occupational therapy  Behavioral Health checks every 7 minutes    Re-start Prozac at 10mg daily for depressive symptosm, advance to 20mg daily tomorrow       Current Facility-Administered Medications   Medication Dose Route Frequency Provider Last Rate    acetaminophen  650 mg Oral Q6H PRN Salena Kinney MD      acetaminophen  650 mg Oral Q4H PRN Noy Lin MD      acetaminophen  975 mg Oral Q6H PRN Noy Lin MD      aluminum-magnesium hydroxide-simethicone  30 mL Oral Q4H PRN Noy Lin MD      artificial tear  1 application Both Eyes W3V PRN Noy Lin MD      aspirin  81 mg Oral Daily Pete Alvarez MD      atorvastatin  40 mg Oral Daily With Susana Faith MD      haloperidol lactate  2 5 mg Intramuscular Q4H PRN Max 4/day Noy Lin MD      And    LORazepam  1 mg Intramuscular Q4H PRN Max 4/day Noy Lin MD      And    benztropine  0 5 mg Intramuscular Q4H PRN Max 4/day Noy Lin MD      haloperidol lactate  5 mg Intramuscular Q4H PRN Max 4/day Noy Lin MD      And    LORazepam  2 mg Intramuscular Q4H PRN Max 4/day Noy Lin MD      And    benztropine  1 mg Intramuscular Q4H PRN Max 4/day Noy Lin MD      benztropine  1 mg Oral Q4H PRN Max 6/day Noy Lin MD      bisacodyl  10 mg Rectal Daily PRN Linda Brown MD      cholecalciferol  400 Units Oral Daily Pete Alvarez MD      hydrOXYzine HCL  50 mg Oral Q6H PRN Max 4/day Noy Lin MD      Or    diphenhydrAMINE  50 mg Intramuscular Q6H PRN Noy Lin MD      divalproex sodium  2,500 mg Oral HS MD Ben Trujillo ON 8/2/2022] FLUoxetine  20 mg Oral Daily Sneha Weber PA-C      haloperidol  1 mg Oral Q6H PRN Noy Lin MD      haloperidol  10 mg Oral BID Pete Alvarez MD      haloperidol  2 5 mg Oral Q4H PRN Max 4/day Noy Lin MD      haloperidol  5 mg Oral Q4H PRN Max 4/day Noy Lin MD      hydrOXYzine HCL  100 mg Oral Q6H PRN Max 4/day Noy Lin MD      Or    LORazepam  2 mg Intramuscular Q6H PRN Noy Lin MD      hydrOXYzine HCL  25 mg Oral Q6H PRN Max 4/day Noy Lin MD      lisinopril  20 mg Oral Daily MD Anabel Trujillo mirtazapine  45 mg Oral HS Ace Geller MD      polyethylene glycol  17 g Oral Daily PRN Dk Kim MD      prazosin  2 mg Oral HS Ace Geller MD      senna-docusate sodium  1 tablet Oral Daily PRN Jeanette Meza MD      senna-docusate sodium  1 tablet Oral BID Ace Geller MD       Risks / Benefits of Treatment:    Risks, benefits, and possible side effects of medications explained to patient and patient verbalizes understanding and agreement for treatment  Counseling / Coordination of Care:    Patient's progress discussed with staff in treatment team meeting  Medications, treatment progress and treatment plan reviewed with patient      Krish Saenz PA-C 08/01/22

## 2022-08-01 NOTE — PROGRESS NOTES
Status: Pt reported having A/H & was placed in a chair by the nurses station, however, she heard that [place] was on the TV in the day room, so she quickly contracted for safety & quickly went down to the day room to watch the movie  Pt reported having CAH to bite her left arm did so very lightly; it didn't break the skin  Pt refused to shower & the clothing she brought in with her, is dirty  Medication: Melatonin discontinued / PRN - Atarax  D/C: [facility](?) /      08/01/22 0750   Team Meeting   Meeting Type Daily Rounds   Team Members Present   Team Members Present Physician;Nurse; Other (Discipline and Name);    Physician Team Member Dr Sanford schwartz / Liset Breaux Team Member Raeann Lobato / Santos Tucker Management Team Member Kenrick Johnson / Kristofer Burton   Other (Discipline and Name) Sumit Simmons (art therapy)   Patient/Family Present   Patient Present No   Patient's Family Present No

## 2022-08-01 NOTE — CASE MANAGEMENT
CM contacted Pt's residential care coordinator, Sanam at [phone] & left a message to provide & update & review tentative d/c for [facility]  CM called back & left a message for care coordinator, Elías Guevara at ext [phone], as Sanam is off on Mondays  CM contacted [address] @ [phone] & spoke with Karthik Marie, who reported that Pt is already scheduled with Marlene Golden for 8/5 at 10 AM   CM was transferred to [place] who was able to schedule Pt with Dr Kathy Nicole for 8/4 @ 10:20 AM   CM confirmed fax number [phone] to send discharge information

## 2022-08-01 NOTE — NURSING NOTE
Patient resting in bed following breakfast   When prompted, was minimal in conversation, however, did report + CAH  Pt disclosed that her hallucinations have not improved yet, and reports they are commanding "to slit my wrists and throat "  Pt denies a current plan or intention to harm self  Pt was offered PRN medications, however, declined, reporting "I will be fine, just want to rest in bed right now to help "  Denied HI, VH or active SI and was encouraged to shower today if feeling up to it, but declined thus far today

## 2022-08-02 PROBLEM — Z00.8 MEDICAL CLEARANCE FOR PSYCHIATRIC ADMISSION: Status: RESOLVED | Noted: 2022-04-27 | Resolved: 2022-08-02

## 2022-08-02 LAB
25(OH)D3 SERPL-MCNC: 24.6 NG/ML (ref 30–100)
ALBUMIN SERPL BCP-MCNC: 3.6 G/DL (ref 3.5–5)
ALP SERPL-CCNC: 65 U/L (ref 46–116)
ALT SERPL W P-5'-P-CCNC: 29 U/L (ref 12–78)
ANION GAP SERPL CALCULATED.3IONS-SCNC: 7 MMOL/L (ref 4–13)
AST SERPL W P-5'-P-CCNC: 16 U/L (ref 5–45)
BASOPHILS # BLD AUTO: 0.03 THOUSANDS/ΜL (ref 0–0.1)
BASOPHILS NFR BLD AUTO: 1 % (ref 0–1)
BILIRUB SERPL-MCNC: 0.4 MG/DL (ref 0.2–1)
BUN SERPL-MCNC: 12 MG/DL (ref 5–25)
CALCIUM SERPL-MCNC: 9 MG/DL (ref 8.3–10.1)
CHLORIDE SERPL-SCNC: 101 MMOL/L (ref 96–108)
CHOLEST SERPL-MCNC: 162 MG/DL
CO2 SERPL-SCNC: 31 MMOL/L (ref 21–32)
CREAT SERPL-MCNC: 0.64 MG/DL (ref 0.6–1.3)
EOSINOPHIL # BLD AUTO: 0.06 THOUSAND/ΜL (ref 0–0.61)
EOSINOPHIL NFR BLD AUTO: 1 % (ref 0–6)
ERYTHROCYTE [DISTWIDTH] IN BLOOD BY AUTOMATED COUNT: 14 % (ref 11.6–15.1)
FLUAV RNA RESP QL NAA+PROBE: NEGATIVE
FLUBV RNA RESP QL NAA+PROBE: NEGATIVE
FOLATE SERPL-MCNC: 14.2 NG/ML (ref 3.1–17.5)
GFR SERPL CREATININE-BSD FRML MDRD: 103 ML/MIN/1.73SQ M
GLUCOSE SERPL-MCNC: 90 MG/DL (ref 65–140)
HCT VFR BLD AUTO: 39.3 % (ref 34.8–46.1)
HDLC SERPL-MCNC: 37 MG/DL
HGB BLD-MCNC: 13 G/DL (ref 11.5–15.4)
IMM GRANULOCYTES # BLD AUTO: 0.09 THOUSAND/UL (ref 0–0.2)
IMM GRANULOCYTES NFR BLD AUTO: 2 % (ref 0–2)
LDLC SERPL CALC-MCNC: 92 MG/DL (ref 0–100)
LYMPHOCYTES # BLD AUTO: 1.75 THOUSANDS/ΜL (ref 0.6–4.47)
LYMPHOCYTES NFR BLD AUTO: 35 % (ref 14–44)
MCH RBC QN AUTO: 32.4 PG (ref 26.8–34.3)
MCHC RBC AUTO-ENTMCNC: 33.1 G/DL (ref 31.4–37.4)
MCV RBC AUTO: 98 FL (ref 82–98)
MONOCYTES # BLD AUTO: 0.51 THOUSAND/ΜL (ref 0.17–1.22)
MONOCYTES NFR BLD AUTO: 10 % (ref 4–12)
NEUTROPHILS # BLD AUTO: 2.5 THOUSANDS/ΜL (ref 1.85–7.62)
NEUTS SEG NFR BLD AUTO: 51 % (ref 43–75)
NONHDLC SERPL-MCNC: 125 MG/DL
NRBC BLD AUTO-RTO: 0 /100 WBCS
PLATELET # BLD AUTO: 166 THOUSANDS/UL (ref 149–390)
PMV BLD AUTO: 9.9 FL (ref 8.9–12.7)
POTASSIUM SERPL-SCNC: 4.1 MMOL/L (ref 3.5–5.3)
PROT SERPL-MCNC: 7.4 G/DL (ref 6.4–8.4)
RBC # BLD AUTO: 4.01 MILLION/UL (ref 3.81–5.12)
RPR SER QL: NORMAL
RSV RNA RESP QL NAA+PROBE: NEGATIVE
SARS-COV-2 RNA RESP QL NAA+PROBE: NEGATIVE
SODIUM SERPL-SCNC: 139 MMOL/L (ref 135–147)
TRIGL SERPL-MCNC: 163 MG/DL
TSH SERPL DL<=0.05 MIU/L-ACNC: 2.95 UIU/ML (ref 0.45–4.5)
VALPROATE SERPL-MCNC: 102 UG/ML (ref 50–100)
VIT B12 SERPL-MCNC: 748 PG/ML (ref 100–900)
WBC # BLD AUTO: 4.94 THOUSAND/UL (ref 4.31–10.16)

## 2022-08-02 PROCEDURE — 85025 COMPLETE CBC W/AUTO DIFF WBC: CPT | Performed by: PSYCHIATRY & NEUROLOGY

## 2022-08-02 PROCEDURE — 82607 VITAMIN B-12: CPT | Performed by: PSYCHIATRY & NEUROLOGY

## 2022-08-02 PROCEDURE — 80164 ASSAY DIPROPYLACETIC ACD TOT: CPT | Performed by: PSYCHIATRY & NEUROLOGY

## 2022-08-02 PROCEDURE — 80053 COMPREHEN METABOLIC PANEL: CPT | Performed by: PSYCHIATRY & NEUROLOGY

## 2022-08-02 PROCEDURE — 84443 ASSAY THYROID STIM HORMONE: CPT | Performed by: PSYCHIATRY & NEUROLOGY

## 2022-08-02 PROCEDURE — 82306 VITAMIN D 25 HYDROXY: CPT | Performed by: PSYCHIATRY & NEUROLOGY

## 2022-08-02 PROCEDURE — 0241U HB NFCT DS VIR RESP RNA 4 TRGT: CPT | Performed by: PHYSICIAN ASSISTANT

## 2022-08-02 PROCEDURE — 80061 LIPID PANEL: CPT | Performed by: PSYCHIATRY & NEUROLOGY

## 2022-08-02 PROCEDURE — 99232 SBSQ HOSP IP/OBS MODERATE 35: CPT | Performed by: PHYSICIAN ASSISTANT

## 2022-08-02 PROCEDURE — 82746 ASSAY OF FOLIC ACID SERUM: CPT | Performed by: PSYCHIATRY & NEUROLOGY

## 2022-08-02 RX ORDER — DIVALPROEX SODIUM 500 MG/1
2500 TABLET, EXTENDED RELEASE ORAL
Qty: 150 TABLET | Refills: 0 | Status: SHIPPED | OUTPATIENT
Start: 2022-08-02 | End: 2022-10-07

## 2022-08-02 RX ORDER — MIRTAZAPINE 45 MG/1
45 TABLET, FILM COATED ORAL
Qty: 30 TABLET | Refills: 0 | Status: SHIPPED | OUTPATIENT
Start: 2022-08-02 | End: 2022-10-07

## 2022-08-02 RX ORDER — FLUOXETINE HYDROCHLORIDE 20 MG/1
20 CAPSULE ORAL DAILY
Qty: 30 CAPSULE | Refills: 0 | Status: SHIPPED | OUTPATIENT
Start: 2022-08-03 | End: 2022-09-02

## 2022-08-02 RX ORDER — OMEGA-3S/DHA/EPA/FISH OIL/D3 300MG-1000
400 CAPSULE ORAL DAILY
Qty: 30 TABLET | Refills: 0 | Status: SHIPPED | OUTPATIENT
Start: 2022-08-03 | End: 2022-10-07

## 2022-08-02 RX ORDER — PRAZOSIN HYDROCHLORIDE 2 MG/1
2 CAPSULE ORAL
Qty: 30 CAPSULE | Refills: 0 | Status: SHIPPED | OUTPATIENT
Start: 2022-08-02 | End: 2022-10-07

## 2022-08-02 RX ORDER — ASPIRIN 81 MG/1
81 TABLET, CHEWABLE ORAL DAILY
Qty: 30 TABLET | Refills: 0 | Status: SHIPPED | OUTPATIENT
Start: 2022-08-02 | End: 2022-10-07

## 2022-08-02 RX ORDER — AMOXICILLIN 250 MG
1 CAPSULE ORAL 2 TIMES DAILY
Qty: 60 TABLET | Refills: 0 | Status: SHIPPED | OUTPATIENT
Start: 2022-08-02 | End: 2022-10-07

## 2022-08-02 RX ORDER — LISINOPRIL 20 MG/1
20 TABLET ORAL DAILY
Qty: 30 TABLET | Refills: 0 | Status: SHIPPED | OUTPATIENT
Start: 2022-08-02 | End: 2022-10-07

## 2022-08-02 RX ORDER — HALOPERIDOL 10 MG/1
10 TABLET ORAL 2 TIMES DAILY
Qty: 60 TABLET | Refills: 0 | Status: SHIPPED | OUTPATIENT
Start: 2022-08-02 | End: 2022-09-01

## 2022-08-02 RX ORDER — ATORVASTATIN CALCIUM 40 MG/1
40 TABLET, FILM COATED ORAL
Qty: 30 TABLET | Refills: 0 | Status: SHIPPED | OUTPATIENT
Start: 2022-08-02 | End: 2022-10-07

## 2022-08-02 RX ADMIN — MIRTAZAPINE 45 MG: 15 TABLET, FILM COATED ORAL at 21:11

## 2022-08-02 RX ADMIN — DIVALPROEX SODIUM 2500 MG: 500 TABLET, EXTENDED RELEASE ORAL at 21:11

## 2022-08-02 RX ADMIN — CHOLECALCIFEROL (VITAMIN D3) 10 MCG (400 UNIT) TABLET 400 UNITS: at 09:10

## 2022-08-02 RX ADMIN — LISINOPRIL 20 MG: 20 TABLET ORAL at 09:10

## 2022-08-02 RX ADMIN — ASPIRIN 81 MG CHEWABLE TABLET 81 MG: 81 TABLET CHEWABLE at 09:10

## 2022-08-02 RX ADMIN — SENNOSIDES AND DOCUSATE SODIUM 1 TABLET: 50; 8.6 TABLET ORAL at 17:32

## 2022-08-02 RX ADMIN — PRAZOSIN HYDROCHLORIDE 2 MG: 1 CAPSULE ORAL at 21:11

## 2022-08-02 RX ADMIN — ATORVASTATIN CALCIUM 40 MG: 40 TABLET, FILM COATED ORAL at 17:31

## 2022-08-02 RX ADMIN — HALOPERIDOL 10 MG: 10 TABLET ORAL at 17:29

## 2022-08-02 RX ADMIN — SENNOSIDES AND DOCUSATE SODIUM 1 TABLET: 50; 8.6 TABLET ORAL at 09:10

## 2022-08-02 RX ADMIN — FLUOXETINE 20 MG: 20 CAPSULE ORAL at 09:10

## 2022-08-02 RX ADMIN — HALOPERIDOL 10 MG: 10 TABLET ORAL at 09:10

## 2022-08-02 NOTE — DISCHARGE SUMMARY
Discharge Summary - [facility] 47 y o  female MRN: [id number]  Unit/Bed#: -01 Encounter: [id number]     Admission Date: 7/28/2022         Discharge Date: 8/3/22    Attending Psychiatrist: Ton Caballero*    Reason for Admission/HPI:     Per initial H&P from Dr Drake Veronica on 7/28/22:    "Melanie Tolbert is a 47 y o  female presents with increasing SI and hallucinations since her boyfriend moved out 3 years ago  She states she used a plastic utensil to cut herself and bit herself and is requesting thorazine  She was able to verbalize she would feel safe with staff then was unable to state she could ask for help from staff if she had urges to cut self with pencil  Still also with thoughts of running into traffic  Her suicidal thoughts came on suddenly 3 days ago and rapidly worsened and her stress is not being able to live in her own house and not being able to see her boyfriend  She is asking at the end of interview for a 1:1, will keep her close to RN station for now and see if thoughts are able to be managed with coping skills or if patient will require a 1:1      Per D Kristina on 7/27 "Patient states that she has been having more suicidal thoughts, but denies any specific stressor or trigger outside of missing her boyfriend  Patient states she took a plastic knife today and scratched her forearm  Patient has a similar history of scratching herself, and seeking inpatient treatment  Patient has multiple inpatient admissions, 17x since January 2021, and last being at [facility] 5 weeks ago  Patient lives in a monitored group home and has a therapist, psychiatrist, and  through her home at [facility] [employer]  Patient states that she has been living there too many years now, and doesn't like it for that reason   Patient denies any real concerns with her living environment and denies anything that has changed recently making her depression worse now       Patient denies issue with appetite, as she ate a turkey sandwich throughout entire assessment  Patient reports difficulty with sleep, but does not elaborate on such  Patient continues to express that if she leaves the hospital she would kill herself by running into traffic  Patient does have a history of scratching herself, and previous suicide attempt via overdose about 2 years ago "     Psychiatric Review Of Systems:  sleep: yes  appetite changes: yes  weight changes: no  energy/anergy: yes  interest/pleasure/anhedonia: "I just sit around"  somatic symptoms: yes, abdominal pain  anxiety/panic: yes  otoniel: no "shaky"  guilty/hopeless: yes  self injurious behavior/risky behavior: yes, cut L forearm, bit R upper extremity"      Social History     Tobacco History     Smoking Status  Former Smoker    Smokeless Tobacco Use  Never Used    Tobacco Comment  Pt stated "smokes when stressed"          Alcohol History     Alcohol Use Status  Not Currently          Drug Use     Drug Use Status  Not Currently          Sexual Activity     Sexually Active  Not Asked          Activities of Daily Living    Not Asked               Additional Substance Use Detail     Questions Responses    Problems Due to Past Use of Alcohol? No    Problems Due to Past Use of Substances?  No    Substance Use Assessment Denies substance use within the past 12 months    Alcohol Use Frequency Denies use in past 12 months    Cannabis frequency Never used    Comment: Never used on 1/9/2021     Heroin Frequency Denies use in past 12 months    Cocaine frequency Never used    Comment: Never used on 1/9/2021     Crack Cocaine Frequency Denies use in past 12 months    Methamphetamine Frequency Denies use in past 12 months    Narcotic Frequency Denies use in past 12 months    Benzodiazepine Frequency Denies use in past 12 months    Amphetamine frequency Denies use in past 12 months    Barbituate Frequency Denies use use in past 12 months    Inhalant frequency Never used    Comment: Never used on 1/9/2021     Hallucinogen frequency Never used    Comment: Never used on 1/9/2021     Ecstasy frequency Never used    Comment: Never used on 1/9/2021     Other drug frequency Never used    Comment: Never used on 1/9/2021     Opiate frequency Denies use in past 12 months    Last reviewed by Gloria Gillis RN on 7/28/2022          Past Medical History:   Diagnosis Date    Anxiety     Bipolar 1 disorder ([facility] )     Bipolar affective disorder, depressed, severe ([facility] ) 10/10/2021    Borderline personality disorder ([facility] )     CAD (coronary artery disease)     Cognitive impairment     Depression     Dyslipidemia     GERD (gastroesophageal reflux disease)     Hypertension     Obesity     Psychiatric disorder     Psychiatric illness     PTSD (post-traumatic stress disorder)     Schizoaffective disorder ([facility] )     Seizure ([facility] )      Past Surgical History:   Procedure Laterality Date    APPENDECTOMY      PATIENT DENIES HAVING APPENDIX REMOVED    CHOLECYSTECTOMY      FOOT SURGERY Right        Medications: All current active medications have been reviewed  Medications prior to admission:    Prior to Admission Medications   Prescriptions Last Dose Informant Patient Reported? Taking?    D-1000 Extra Strength 25 MCG (1000 UT) tablet   Yes No   aspirin 81 mg chewable tablet   No No   Sig: Chew 1 tablet (81 mg total) daily   atorvastatin (LIPITOR) 40 mg tablet   No No   Sig: Take 1 tablet (40 mg total) by mouth daily with dinner   divalproex sodium (DEPAKOTE ER) 500 mg 24 hr tablet   No No   Sig: Take 5 tablets (2,500 mg total) by mouth daily at bedtime   haloperidol (HALDOL) 10 mg tablet   No No   Sig: Take 1 tablet (10 mg total) by mouth 2 (two) times a day Take 1 tablet in the morning and 1 tablet before bedtime   levOCARNitine (CARNITOR) 330 MG tablet   Yes No   lisinopril (ZESTRIL) 20 mg tablet   No No   Sig: Take 1 tablet (20 mg total) by mouth daily   lurasidone (Latuda) 40 mg tablet   Yes No   Sig: Take 40 mg by mouth 2 (two) times a day   melatonin 3 mg   Yes No   mirtazapine (REMERON) 45 MG tablet   No No   Sig: Take 1 tablet (45 mg total) by mouth daily at bedtime   prazosin (MINIPRESS) 2 mg capsule   No No   Sig: Take 1 capsule (2 mg total) by mouth daily at bedtime   senna-docusate sodium (SENOKOT S) 8 6-50 mg per tablet   No No   Sig: Take 1 tablet by mouth 2 (two) times a day   ziprasidone (GEODON) 40 mg capsule   Yes No      Facility-Administered Medications: None       Allergies: Allergies   Allergen Reactions    Fish Oil - Food Allergy Other (See Comments)     unknown    Fish-Derived Products - Food Allergy Hives       Objective     Vital signs in last 24 hours:    Temp:  [98 °F (36 7 °C)-98 6 °F (37 °C)] 98 °F (36 7 °C)  HR:  [68-99] 69  Resp:  [16-18] 16  BP: (113-120)/(58-62) 113/58    No intake or output data in the 24 hours ending 08/03/22 1031    Hospital Course:     Leydi was admitted to the inpatient psychiatric unit and started on Behavioral Health checks every 7 minutes  During the hospitalization she was encouraged to attend individual therapy, group therapy, milieu therapy and occupational therapy  Psychiatric medications were adjusted over the hospital stay  To address depressive symptoms, self-abusive behavior and auditory hallucinations, Leydi was treated with antidepressant Prozac and Remeron, mood stabilizer Depakote ER, antipsychotic medication Haldol and medication to help with nightmares and flashbacks Prazosin  Medication doses were adjusted during the hospital course  Prozac was added and titrated to 20mg daily for depressive symptoms  Remeron was continued at 45mg qhs  Depakote ER was continued at 2,500mg qhs  On that dose of Depakote ER, the VPA level was 102 on 8/2/22  Unfortunately, that level represents a peak as opposed to a trough (patient is a difficult stick and there was not staff capable of drawing a trough VPA level at 2100 at night)   Her trough VPA level is likely lower, and as such, her dose was deemed clinically therapeutic  Haldol was continued at 10mg BID  Minipress was continued at 2mg qhs  Prior to beginning of treatment medications risks and benefits and possible side effects including risk of liver impairment related to treatment with Depakote, risk of parkinsonian symptoms, Tardive Dyskinesia and metabolic syndrome related to treatment with antipsychotic medications and risk of suicidality and serotonin syndrome related to treatment with antidepressants were reviewed with Leydi  She verbalized understanding and agreement for treatment  Upon admission Leydi was seen by medical service for medical clearance for inpatient treatment and medical follow up  Leydi's symptoms slowly improved over the hospital course  Initially after admission she was still feeling frustrated and overwhelmed  With adjustment of medications and therapeutic milieu her symptoms gradually improved  At the end of treatment Leydi was doing well  Her mood was improved at the time of discharge  Leydi denied suicidal ideation, intent or plan at the time of discharge and denied homicidal ideation, intent or plan at the time of discharge  There was no overt psychosis at the time of discharge  Auditory hallucinations were significantly improved and not command in nature  Leydi was participating appropriately in milieu at the time of discharge  Behavior was appropriate on the unit at the time of discharge  Sleep and appetite were improved  Leydi was tolerating medications and was not reporting any significant side effects at the time of discharge  We felt that at the end of the hospital stay Leydi was at baseline and was ready for discharge      Mental Status at Time of Discharge:     Appearance:  age appropriate, casually dressed, adequate grooming   Behavior:  cooperative, calm   Speech:  normal rate and volume   Mood:  improved, less depressed   Affect:  reactive, slightly brighter   Thought Process:  organized, linear, decreased rate of thoughts, concrete   Associations: concrete associations   Thought Content:  no overt delusions, negative thoughts, intrusive thoughts   Perceptual Disturbances: no visual hallucinations, auditory hallucinations still present, but less frequent, able to ignore them, chronic, no commands   Risk Potential: Suicidal ideation - None at present, self abusive behavior continued early in hospitalization but resolved over final few days  Homicidal ideation - None at present  Potential for aggression - No   Sensorium:  oriented to person, place and time/date   Memory:  recent and remote memory grossly intact   Consciousness:  alert and awake   Attention/Concentration: attention span and concentration appear shorter than expected for age   Insight:  fair   Judgment: fair   Gait/Station: slow gait   Motor Activity: no abnormal movements       Admission Diagnosis:    Principal Problem:    Borderline personality disorder ([place] [id number] )  Active Problems:    Primary hypertension    Hyperlipidemia    Seizures ([place] [address] )    History of non-suicidal self-harm      Discharge Diagnosis:     Principal Problem:    Borderline personality disorder ([place] [address] )  Active Problems:    Primary hypertension    Hyperlipidemia    Seizures ([place] [address] )    History of non-suicidal self-harm  Resolved Problems:    Medical clearance for psychiatric admission      Lab Results:   I have personally reviewed all pertinent laboratory/tests results    Most Recent Labs:   Lab Results   Component Value Date    WBC 4 94 08/02/2022    RBC 4 01 08/02/2022    HGB 13 0 08/02/2022    HCT 39 3 08/02/2022     08/02/2022    RDW 14 0 08/02/2022    NEUTROABS 2 50 08/02/2022    SODIUM 139 08/02/2022    K 4 1 08/02/2022     08/02/2022    CO2 31 08/02/2022    BUN 12 08/02/2022    CREATININE 0 64 08/02/2022    GLUC 90 08/02/2022    GLUF 91 05/05/2022    CALCIUM 9 0 08/02/2022    AST 16 08/02/2022    ALT 29 08/02/2022    ALKPHOS 65 08/02/2022    TP 7 4 08/02/2022 ALB 3 6 08/02/2022    TBILI 0 40 08/02/2022    CHOLESTEROL 162 08/02/2022    HDL 37 (L) 08/02/2022    TRIG 163 (H) 08/02/2022    LDLCALC 92 08/02/2022    NONHDLC 125 08/02/2022    VALPROICTOT 102 (H) 08/02/2022    [id number] 2 950 08/02/2022    PREGUR NEG 07/27/2022    PREGSERUM Negative 04/28/2022    RPR Non-Reactive 08/01/2022    HGBA1C 4 9 07/05/2022    EAG 94 07/05/2022       Discharge Medications:    See after visit summary for all reconciled discharge medications provided to patient and family  Discharge instructions/Information to patient and family:     See after visit summary for information provided to patient and family  Provisions for Follow-Up Care:    See after visit summary for information related to follow-up care and any pertinent home health orders  Discharge Statement:    I spent 33 minutes discharging the patient  This time was spent on the day of discharge  I had direct contact with the patient on the day of discharge  Additional documentation is required if more than 30 minutes were spent on discharge:    I reviewed with Leydi importance of compliance with medications and outpatient treatment after discharge  I discussed the medication regimen and possible side effects of the medications with Leydi prior to discharge  At the time of discharge she was tolerating psychiatric medications  I discussed outpatient follow up with Leydi  I reviewed with Leydi crisis plan and safety plan upon discharge  Leydi was competent to understand risks and benefits of withholding information and risks and benefits of her actions      Discharge on Two Antipsychotic Medications: No    Melody Collet, PA-C 08/03/22

## 2022-08-02 NOTE — DISCHARGE INSTR - APPOINTMENTS
Kaylin Bronson or Lilliana, our Eliza and Reva, will be calling you after your discharge, on the phone number that you provided  They will be available as an additional support, if needed  If you wish to speak with one of them, you may contact Alexandra Godinez at [phone] or Teofilo Maldonado at [phone]  You are being discharged home to [facility], located at [address], [place], [address] [facility]  The best number to contact you, is through your residential care coordinator, Sanam at [phone]

## 2022-08-02 NOTE — NURSING NOTE
Patient prompted for morning labs, after they were attempted and unsuccessful, pt was cooperative with transport downstairs to OP lab for completion  Pt returned, ate breakfast, met with provider, discussed D/C plans and verbalized agreement  Pt then cooperated with Covid swab completed by RN and reports still having presence of CAH "telling me to hurt myself and negative things about me" but did express mild improvement from yesterday  Denies active thoughts to harm self and agrees to speak to staff if unable to remain safe on unit  Resting in bed at this time

## 2022-08-02 NOTE — PROGRESS NOTES
Progress Note - Behavioral Health     Leydi Khan 47 y o  female MRN: [id number]   Unit/Bed#: U 254-01 Encounter: [id number]    Behavior over the last 24 hours: some improvement  Leydi is a 60-year-old female with a history of borderline personality disorder and major depressive disorder presents for psychiatric follow-up  Staff reports that she was pretending to buy her arm in front of the nurse station yesterday and engaging other attention seeking behaviors  She is redirectable  She is calm and cooperative upon approach, expresses her desire not to return back to her group home but feels her mood is improved, auditory hallucinations are less frequent and less derogatory, no commands  Denies SI and HI  No VH and no manic/hypomanic symptoms  Counseled patient regarding recurrent nonsuicidal self-injurious behavior      Sleep: normal  Appetite: normal  Medication side effects: No   ROS: all other systems are negative    Mental Status Evaluation:    Appearance:  age appropriate, casually dressed, adequate grooming   Behavior:  cooperative, calm   Speech:  scant   Mood:  slightly less depressed   Affect:  appropriate, reactive, slightly brighter   Thought Process:  organized, goal directed, linear, decreased rate of thoughts   Associations: concrete associations   Thought Content:  no overt delusions, negative thoughts, intrusive thoughts   Perceptual Disturbances: no visual hallucinations, auditory hallucinations still present, but less frequent, able to ignore them, chronic, no commands, does not appear responding to internal stimuli   Risk Potential: Suicidal ideation - None at present  Homicidal ideation - None at present  Potential for aggression - No   Sensorium:  oriented to person, place and time/date   Memory:  recent and remote memory grossly intact   Consciousness:  alert and awake   Attention/Concentration: attention span and concentration appear shorter than expected for age   Insight:  limited Judgment: fair   Gait/Station: slow gait   Motor Activity: no abnormal movements     Vital signs in last 24 hours:    Temp:  [97 7 °F (36 5 °C)-98 6 °F (37 °C)] 97 7 °F (36 5 °C)  HR:  [70-92] 70  Resp:  [18] 18  BP: (109-124)/(55-76) 109/55    Laboratory results: I have personally reviewed all pertinent laboratory/tests results    Results from the past 24 hours:   Recent Results (from the past 24 hour(s))   COVID/FLU/RSV    Collection Time: 08/02/22  8:54 AM    Specimen: Nose; Nares   Result Value Ref Range    SARS-CoV-2 Negative Negative    INFLUENZA A PCR Negative Negative    INFLUENZA B PCR Negative Negative    RSV PCR Negative Negative   CBC and differential    Collection Time: 08/02/22  9:27 AM   Result Value Ref Range    WBC 4 94 4 31 - 10 16 Thousand/uL    RBC 4 01 3 81 - 5 12 Million/uL    Hemoglobin 13 0 11 5 - 15 4 g/dL    Hematocrit 39 3 34 8 - 46 1 %    MCV 98 82 - 98 fL    MCH 32 4 26 8 - 34 3 pg    MCHC 33 1 31 4 - 37 4 g/dL    RDW 14 0 11 6 - 15 1 %    MPV 9 9 8 9 - 12 7 fL    Platelets 987 414 - 749 Thousands/uL    nRBC 0 /100 WBCs    Neutrophils Relative 51 43 - 75 %    Immat GRANS % 2 0 - 2 %    Lymphocytes Relative 35 14 - 44 %    Monocytes Relative 10 4 - 12 %    Eosinophils Relative 1 0 - 6 %    Basophils Relative 1 0 - 1 %    Neutrophils Absolute 2 50 1 85 - 7 62 Thousands/µL    Immature Grans Absolute 0 09 0 00 - 0 20 Thousand/uL    Lymphocytes Absolute 1 75 0 60 - 4 47 Thousands/µL    Monocytes Absolute 0 51 0 17 - 1 22 Thousand/µL    Eosinophils Absolute 0 06 0 00 - 0 61 Thousand/µL    Basophils Absolute 0 03 0 00 - 0 10 Thousands/µL   Comprehensive metabolic panel    Collection Time: 08/02/22 10:18 AM   Result Value Ref Range    Sodium 139 135 - 147 mmol/L    Potassium 4 1 3 5 - 5 3 mmol/L    Chloride 101 96 - 108 mmol/L    CO2 31 21 - 32 mmol/L    ANION GAP 7 4 - 13 mmol/L    BUN 12 5 - 25 mg/dL    Creatinine 0 64 0 60 - 1 30 mg/dL    Glucose 90 65 - 140 mg/dL    Calcium 9 0 8 3 - 10 1 mg/dL    AST 16 5 - 45 U/L    ALT 29 12 - 78 U/L    Alkaline Phosphatase 65 46 - 116 U/L    Total Protein 7 4 6 4 - 8 4 g/dL    Albumin 3 6 3 5 - 5 0 g/dL    Total Bilirubin 0 40 0 20 - 1 00 mg/dL    eGFR 103 ml/min/1 73sq m   Lipid panel    Collection Time: 08/02/22 10:18 AM   Result Value Ref Range    Cholesterol 162 See Comment mg/dL    Triglycerides 163 (H) See Comment mg/dL    HDL, Direct 37 (L) >=50 mg/dL    LDL Calculated 92 0 - 100 mg/dL    Non-HDL-Chol (CHOL-HDL) 125 mg/dl   Valproic acid level, total    Collection Time: 08/02/22 10:18 AM   Result Value Ref Range    Valproic Acid, Total 102 (H) 50 - 100 ug/mL   TSH, 3rd generation    Collection Time: 08/02/22 10:19 AM   Result Value Ref Range    TSH 3RD GENERATON 2 950 0 450 - 4 500 uIU/mL   Vitamin D 25 hydroxy    Collection Time: 08/02/22 12:48 PM   Result Value Ref Range    Vit D, 25-Hydroxy 24 6 (L) 30 0 - 100 0 ng/mL     Most Recent Labs:   Lab Results   Component Value Date    WBC 4 94 08/02/2022    RBC 4 01 08/02/2022    HGB 13 0 08/02/2022    HCT 39 3 08/02/2022     08/02/2022    RDW 14 0 08/02/2022    NEUTROABS 2 50 08/02/2022    SODIUM 139 08/02/2022    K 4 1 08/02/2022     08/02/2022    CO2 31 08/02/2022    BUN 12 08/02/2022    CREATININE 0 64 08/02/2022    GLUC 90 08/02/2022    CALCIUM 9 0 08/02/2022    AST 16 08/02/2022    ALT 29 08/02/2022    ALKPHOS 65 08/02/2022    TP 7 4 08/02/2022    ALB 3 6 08/02/2022    TBILI 0 40 08/02/2022    CHOLESTEROL 162 08/02/2022    HDL 37 (L) 08/02/2022    TRIG 163 (H) 08/02/2022    LDLCALC 92 08/02/2022    NONHDLC 125 08/02/2022    VALPROICTOT 102 (H) 08/02/2022    [id number] 2 950 08/02/2022    PREGUR NEG 07/27/2022    PREGSERUM Negative 04/28/2022    RPR Non-Reactive 02/13/2022    HGBA1C 4 9 07/05/2022    EAG 94 07/05/2022       Progress Toward Goals: progressing, working on coping skills, discharge planning    Assessment/Plan   Principal Problem:    Borderline personality disorder [facility])  Active Problems:    Primary hypertension    Hyperlipidemia    Seizures ([address] )    History of non-suicidal self-harm      Recommended Treatment:     Planned medication and treatment changes:     All current active medications have been reviewed  Encourage group therapy, milieu therapy and occupational therapy  Behavioral Health checks every 7 minutes  Continue current medications:    Current Facility-Administered Medications   Medication Dose Route Frequency Provider Last Rate    acetaminophen  650 mg Oral Q6H PRN [facility] MD Darrin      acetaminophen  650 mg Oral Q4H PRN [facility] MD Darrin      acetaminophen  975 mg Oral Q6H PRN [facility], MD      aluminum-magnesium hydroxide-simethicone  30 mL Oral Q4H PRN [facility], MD      artificial tear  1 application Both Eyes R5A PRN Mirelaevelyn Clifford MD      aspirin  81 mg Oral Daily Forrest Chan MD      atorvastatin  40 mg Oral Daily With Vivian Oliver MD      haloperidol lactate  2 5 mg Intramuscular Q4H PRN Max 4/day Mirelaevelyn Clifford MD      And    LORazepam  1 mg Intramuscular Q4H PRN Max 4/day Mirelaevelyn Clifford MD      And    benztropine  0 5 mg Intramuscular Q4H PRN Max 4/day [facility] MD Darrin      haloperidol lactate  5 mg Intramuscular Q4H PRN Max 4/day [facility] MD Darrin      And    LORazepam  2 mg Intramuscular Q4H PRN Max 4/day Mirelaevelyn Clifford MD      And    benztropine  1 mg Intramuscular Q4H PRN Max 4/day Mirelaevelyn Clifford MD      benztropine  1 mg Oral Q4H PRN Max 6/day [facility] MD Darrin      bisacodyl  10 mg Rectal Daily PRN Suri Rashid MD      cholecalciferol  400 Units Oral Daily Forrest Chan MD      hydrOXYzine HCL  50 mg Oral Q6H PRN Max 4/day Mirela Clifford MD      Or    diphenhydrAMINE  50 mg Intramuscular Q6H PRN Mirelaevelyn Clifford MD      divalproex sodium  2,500 mg Oral HS Forrest Chan MD      FLUoxetine  20 mg Oral Daily Zollie Cea Aislinn Estrada PA-C      haloperidol  1 mg Oral Q6H PRN Reid Client, MD      haloperidol  10 mg Oral BID Lazara Lopez MD      haloperidol  2 5 mg Oral Q4H PRN Max 4/day Reid Client, MD      haloperidol  5 mg Oral Q4H PRN Max 4/day Reid Client, MD      hydrOXYzine HCL  100 mg Oral Q6H PRN Max 4/day Reid Client, MD      Or    LORazepam  2 mg Intramuscular Q6H PRN Reid Client, MD      hydrOXYzine HCL  25 mg Oral Q6H PRN Max 4/day Reid Client, MD      lisinopril  20 mg Oral Daily Lazara Lopez MD      mirtazapine  45 mg Oral HS Lazara Lopez MD      polyethylene glycol  17 g Oral Daily PRN Nilson Rachel MD      prazosin  2 mg Oral HS Lazara Lopez MD      senna-docusate sodium  1 tablet Oral Daily PRN Reid Client, MD      senna-docusate sodium  1 tablet Oral BID Lazara Lopez MD       Risks / Benefits of Treatment:    Risks, benefits, and possible side effects of medications explained to patient  Patient has limited understanding of risks and benefits of treatment at this time, but agrees to take medications as prescribed  Counseling / Coordination of Care:    Patient's progress discussed with staff in treatment team meeting  Medications, treatment progress and treatment plan reviewed with patient      Jones Clifford PA-C 08/02/22

## 2022-08-02 NOTE — DISCHARGE INSTR - OTHER ORDERS
In order to find a new residence, you must remain out of the hospital & complete the TIP application process  Please utilize your residential & outpatient supports to do this  Malachi Anand is a confidential 7 days/week telephone support service manned by trained mental health consumers  [facility] operates daily but is not able to accept calls between 2AM-6AM    [facility] provides support, a listening ear and can provide information about available services  [facility] specializes in the concerns of mental health consumers, their families and friends  However, we are also here for anyone who has a mental health concern, is confused about or just doesn't know anything about mental health or where to get information  To reach Malachi Anand, call [phone] accepts calls between 6:00 AM to 10:00 AM and from 4:00 PM to 12:00 AM      Text CONNECT to [phone] from anywhere in the [place], anytime, about any type of crisis  A live, trained Crisis Counselor receives the text and lets you know that they are here to listen  The volunteer Crisis Counselor will help you move from a hot moment to a cool moment  [address] [place] [address] Intervention - licensed telephone and mobile crisis services that provide mental health assessments to all age groups regardless of income or insurance  Crisis Intervention operates 24-hour/7 days a week  [address] [place] [address] assists consumer who are experiencing a mental health emergency and lack the resources to assist themselves  Immediate intervention for suicidal and depressed individuals with home visits/outreach being top priority  Crisis can be contacted at [phone]  The [facility] ([facility]) offers various education & support groups for you & your family  For more information visit their website at http://www [employer]/     Dial 2-1-1 to get connected/get help    Free, confidential information & referral available 24/7: [facility], [address] [place] [address] Services, Counseling, Education/Training, Food/Shelter/Clothing, Health Services, Parenting, Substance Abuse, Support Groups, [employer], & much more  Phone: 2-1-1 or [phone], Web: UXU [email or web address] HARISHQ, Email: Toney@[email or web address]    [facility]  The [facility] ([place] [address], [address]) offers persons with a mental illness a safe, healing environment to explore their personal and vocational potential and receive support in achieving their goals  Instead of traditional therapy, the work of the house is the rehabilitation  As members contribute meaningful work to the house, they build confidence and a sense of purpose  Be a Member - It's Free  Membership in the [facility] is open to any [facility] resident who is 25 or older and has a history of mental illness  Membership is free for life  [place] [address], [address]  Hours: Monday - Friday: 8 a m  - 4 p m  With varying hours on holidays   ([place] [address] [place] [address] ([address]) provides a stress-free atmosphere for persons 18 and older who have experienced mental health issues  What The [address]  [employer]  Outings  Holiday gatherings  Recovery  Employment  Education  Community Resources  Empowerment  Helps participants achieve their goals  Enhances quality of life  Encourages leadership    The [address]  Hours: Monday through Friday: 4 p m  - 9 p m  Saturday: 2 p m  - 8 p m  Closed Sundays  Varying hours on holidays            Contact [phone]    Support & Referral Helpline  Support and Referral Helpline is a 24-hour, 7 days-a-week, listening and referral service provided to all of [place]  Please call [phone] or tty [phone] to speak with one of our specialists    The helpline is possible through partnerships with the [employer]

## 2022-08-02 NOTE — NURSING NOTE
Patient denies SI/HI/AVH, anxiety and depression  Patient pleasant and cooperative  Patient snack and med compliant  Patient was visible on the unit  Patient social  Patient asked for a blanket and per MD, with nurses discretion patient could have 1 blanket  Patient continues to wear paper scrubs  In responding to this request, please exercise your independent professional judgment.  The fact that a question is asked does not imply that any particular answer is desired or expected.

## 2022-08-02 NOTE — PROGRESS NOTES
Status: Pt in the milieu off & on throughout the day  In the evening she said she would bite her arm, but she did not  Pt did attend to her ADLs with prompting  Staff unable to get labs this morning; she will be taken down to the phlebotomists, as valproic acid level is needed  Pt only attended wrap-up group & appeared to have slept overnight  Medication: Prozac 10mg daily started / PRN - Cogentin, Haldol, & Atarax  D/C: Weds / Pt will return to [facility]  COVID swab will be done today, as well as prescriptions sent to [facility]  08/02/22 0130   Team Meeting   Meeting Type Daily Rounds   Team Members Present   Team Members Present Physician;Nurse; Other (Discipline and Name);    Physician Team Member Dr Ivett Mercedes / Brooklyn Stephens / Bonnie Jaffe Team Member Corey Bowles / Filiberto Livingston Management Team Member Shae Mchugh / Zev Alvarez   Other (Discipline and Name) Ricky Adams (art therapy)   Patient/Family Present   Patient Present No   Patient's Family Present No

## 2022-08-02 NOTE — CASE MANAGEMENT
CM met with Pt, who was resting in bed  Pt stating she can't go back to [facility], but not able to identify why she does not like it there  CM inquired if there was any place she liked staying at before, & she said "not really"  CM reviewed that she has had several conversations with her supports from [employer], as well as her insurance, & they all confirm that she has to stay out of the hospital to be eligible for other housing  CM reviewed that the other housing programs, will deny her referral based upon her spending so much time in the hospital   Pt just repeating that she wants to go somewhere else, & asking CM to please send her "anywhere but [place]"

## 2022-08-02 NOTE — BH TRANSITION RECORD
Contact Information: If you have any questions, concerns, pended studies, tests and/or procedures, or emergencies regarding your inpatient behavioral health visit  Please contact [address] [place] [address] behavioral health unit [phone] and ask to speak to a , nurse or physician  A contact is available 24 hours/ 7 days a week at this number  Summary of Procedures Performed During your Stay:  Below is a list of major procedures performed during your hospital stay and a summary of results:  - No major procedures performed  Pending Studies (From admission, onward)    None        Please follow up on the above pending studies with your PCP and/or referring provider

## 2022-08-02 NOTE — CASE MANAGEMENT
ROSE contacted Virgilio Conte from [facility] @ [phone] to inquire about process for Pt to find alternative placement  He reported that Pt really needs to work with her residential team to submit the TIP application  He said that Pt needs to be stable in the community to go through the process  CM inquired what residential programs she could get through the TIP application & he reported they have [address]; he was unsure about [facility]  RSOE inquired if there was anything that could be done on the inpatient side to help facilitate the TIP application for Pt, but he said no, just continue to encourage Pt to utilize her community supports to remain in the community

## 2022-08-02 NOTE — NURSING NOTE
Patient resting in bed with eyes closed, only OOB for meals today  Pt was minimal in conversation, due to RN prompting her while she was sleeping/resting in bed  Pt did not outwardly endorse CAH, SI or appear to be RIS/show preoccupation  Pt shows no interest in getting out of bed this evening aside for eating in dining room

## 2022-08-03 VITALS
TEMPERATURE: 98 F | WEIGHT: 271 LBS | BODY MASS INDEX: 46.26 KG/M2 | RESPIRATION RATE: 16 BRPM | OXYGEN SATURATION: 99 % | HEART RATE: 69 BPM | SYSTOLIC BLOOD PRESSURE: 113 MMHG | DIASTOLIC BLOOD PRESSURE: 58 MMHG | HEIGHT: 64 IN

## 2022-08-03 PROCEDURE — 99239 HOSP IP/OBS DSCHRG MGMT >30: CPT | Performed by: PHYSICIAN ASSISTANT

## 2022-08-03 RX ADMIN — ASPIRIN 81 MG CHEWABLE TABLET 81 MG: 81 TABLET CHEWABLE at 09:09

## 2022-08-03 RX ADMIN — LISINOPRIL 20 MG: 20 TABLET ORAL at 09:09

## 2022-08-03 RX ADMIN — HALOPERIDOL 10 MG: 10 TABLET ORAL at 09:10

## 2022-08-03 RX ADMIN — FLUOXETINE 20 MG: 20 CAPSULE ORAL at 09:10

## 2022-08-03 RX ADMIN — SENNOSIDES AND DOCUSATE SODIUM 1 TABLET: 50; 8.6 TABLET ORAL at 09:10

## 2022-08-03 RX ADMIN — CHOLECALCIFEROL (VITAMIN D3) 10 MCG (400 UNIT) TABLET 400 UNITS: at 09:09

## 2022-08-03 NOTE — PLAN OF CARE
Problem: DISCHARGE PLANNING  Goal: Discharge to home or other facility with appropriate resources  Description: INTERVENTIONS:  - Identify barriers to discharge w/patient and caregiver  - Arrange for needed discharge resources and transportation as appropriate  - Identify discharge learning needs (meds, wound care, etc )  - Arrange for interpretive services to assist at discharge as needed  - Refer to Case Management Department for coordinating discharge planning if the patient needs post-hospital services based on physician/advanced practitioner order or complex needs related to functional status, cognitive ability, or social support system  Outcome: Adequate for Discharge  Note: D/C is planned for tomorrow  Pt has medication management on Thurs & therapy on Friday, both appts at [facility]

## 2022-08-03 NOTE — PLAN OF CARE
Problem: DISCHARGE PLANNING  Goal: Discharge to home or other facility with appropriate resources  Description: INTERVENTIONS:  - Identify barriers to discharge w/patient and caregiver  - Arrange for needed discharge resources and transportation as appropriate  - Identify discharge learning needs (meds, wound care, etc )  - Arrange for interpretive services to assist at discharge as needed  - Refer to Case Management Department for coordinating discharge planning if the patient needs post-hospital services based on physician/advanced practitioner order or complex needs related to functional status, cognitive ability, or social support system  Outcome: Adequate for Discharge     Problem: SELF HARM/SUICIDALITY  Goal: Will have no self-injury during hospital stay  Description: INTERVENTIONS:  - Q 15 MINUTES: Routine safety checks  - Q WAKING SHIFT & PRN: Assess risk to determine if routine checks are adequate to maintain patient safety  - Encourage patient to participate actively in care by formulating a plan to combat response to suicidal ideation, identify supports and resources  Outcome: Adequate for Discharge     Problem: DEPRESSION  Goal: Will be euthymic at discharge  Description: INTERVENTIONS:  - Administer medication as ordered  - Provide emotional support via 1:1 interaction with staff  - Encourage involvement in milieu/groups/activities  - Monitor for social isolation  Outcome: Adequate for Discharge     Problem: PSYCHOSIS  Goal: Will report no hallucinations or delusions  Description: Interventions:  - Administer medication as  ordered  - Every waking shifts and PRN assess for the presence of hallucinations and or delusions  - Assist with reality testing to support increasing orientation  - Assess if patient's hallucinations or delusions are encouraging self-harm or harm to others and intervene as appropriate  Outcome: Adequate for Discharge     Problem: ANXIETY  Goal: Will report anxiety at manageable levels  Description: INTERVENTIONS:  - Administer medication as ordered  - Teach and encourage coping skills  - Provide emotional support  - Assess patient/family for anxiety and ability to cope  Outcome: Adequate for Discharge  Goal: By discharge: Patient will verbalize 2 strategies to deal with anxiety  Description: Interventions:  - Identify any obvious source/trigger to anxiety  - Staff will assist patient in applying identified coping technique/skills  - Encourage attendance of scheduled groups and activities  Outcome: Adequate for Discharge     Problem: ANXIETY  Goal: By discharge: Patient will verbalize 2 strategies to deal with anxiety  Description: Interventions:  - Identify any obvious source/trigger to anxiety  - Staff will assist patient in applying identified coping technique/skills  - Encourage attendance of scheduled groups and activities  Outcome: Adequate for Discharge     Problem: Ineffective Coping  Goal: Participates in unit activities  Description: Interventions:  - Provide therapeutic environment   - Provide required programming   - Redirect inappropriate behaviors   Outcome: Adequate for Discharge

## 2022-08-03 NOTE — CASE MANAGEMENT
CM called [address] [place] [address] residential care coordinator, Sanam @ [phone] & left a message regarding d/c plans for tomorrow  CM also left a message for care coordinator Precious Bush at ext  [phone]  CM met with Pt to review d/c plans for tomorrow  Pt continues to state that she does not want to return but CM reviewed the TIP application process that she needs to complete to get a new residence

## 2022-08-03 NOTE — NURSING NOTE
Instructed to call immediately if any new distortion, blurring, decreased vision or eye pain. Pt resting in room this morning, denies SI/HI, AVH  Pt states "Im alright  I think Im ready to go home " pt denies any questions  Reviewed healthy coping skills to utilize  pt called that she wants rx for lipitor sent to [facility], rx faxed

## 2022-08-03 NOTE — NURSING NOTE
AVS discharge instructions reviewed with pt  Pt denies any concerns or questions and expresses readiness for discharge  Patient discharged from the unit pleasant and calm

## 2022-08-04 NOTE — PROGRESS NOTES
Status: Pt seclusive, lying in bed most of the day  Pt reported having CAH in the morning, but denied later int he day  Pt denies any SI/HI  Medication: Prozac increased to 20mg daily / no PRNs  D/C: Today - CM is waiting for a returned call from [facility] staff regarding transportation time  Pt is scheduled to see her psychiatrist on Thurs(8/4) & her therapist on Fri(8/5)  08/03/22 0830   Team Meeting   Meeting Type Daily Rounds   Team Members Present   Team Members Present Physician;Nurse; Other (Discipline and Name);    Physician Team Member Dr Ivett Mercedes / Jose Sorto Team Member [facility] Management Team Member Shae Mchugh / Zev Alvarez   Other (Discipline and Name) Ricky Adams (art therapy)   Patient/Family Present   Patient Present No   Patient's Family Present No

## 2022-08-04 NOTE — PROGRESS NOTES
AUDIT: 0, PAWSS: 0, BAT: 0, UDS: negative    Pt denied any drug, alcohol, or tobacco use  Pt has a psychiatrist appointment 8/4 & therapy on 8/5 at [facility]

## 2022-08-04 NOTE — CASE MANAGEMENT
ROSE met with Pt to review & sign IMM  Pt stating she really doesn't want to got back to [facility], but CM reviewed that she has no other options at this time  CM reviewed that Pt must remain in the community & work with her care coordinator to complete the TIP application/process  CM reviewed that TIP would get her into an LTSR or CRR  Pt verbalized understanding & had no questions for CM

## 2022-09-21 ENCOUNTER — HOSPITAL ENCOUNTER (EMERGENCY)
Facility: HOSPITAL | Age: 52
Discharge: HOME | End: 2022-09-21
Attending: EMERGENCY MEDICINE
Payer: MEDICARE

## 2022-09-21 VITALS
HEIGHT: 66 IN | BODY MASS INDEX: 44.68 KG/M2 | RESPIRATION RATE: 16 BRPM | WEIGHT: 278 LBS | SYSTOLIC BLOOD PRESSURE: 122 MMHG | TEMPERATURE: 99.5 F | DIASTOLIC BLOOD PRESSURE: 76 MMHG | HEART RATE: 101 BPM | OXYGEN SATURATION: 93 %

## 2022-09-21 DIAGNOSIS — F32.A DEPRESSION: Primary | ICD-10-CM

## 2022-09-21 DIAGNOSIS — F31.9 BIPOLAR DISORDER (HCC): ICD-10-CM

## 2022-09-21 DIAGNOSIS — Z00.8 ENCOUNTER FOR PSYCHOLOGICAL EVALUATION: ICD-10-CM

## 2022-09-21 PROCEDURE — 99285 EMERGENCY DEPT VISIT HI MDM: CPT | Performed by: EMERGENCY MEDICINE

## 2022-09-21 PROCEDURE — 99284 EMERGENCY DEPT VISIT MOD MDM: CPT

## 2022-09-21 NOTE — ED NOTES
Spoke w/ staff Gailen Phalen from [facility]  Gailen Phalen stated that it is not believed that at this time the Pt needs to be admitted  Gailen Phalen stated that they are doing everything that they can do at the facility to keep the Pt comfortable  Pt continues to return to these behaviors when not getting enough perceived attention  This worker explained to Pt w/ Gailen Phalen present that the Pt does not meet inpatient criteria this date and that she would be discharged  Pt looked somewhat confused but answered "ok" and then began getting self ready to leave w/ Gailen Phalen

## 2022-09-21 NOTE — DISCHARGE INSTRUCTIONS
Please contact your outpatient therapy team as soon as possible for further evaluation, if symptoms worsen please return to the emergency department

## 2022-09-21 NOTE — ED NOTES
Pt was brought to the ED via staff from [facility] where she resides  Pt is having thoughts to walk into traffic or cut self w/ silverware even though she admits not having access to it  This worker introduced self and role and explained to Pt the purpose of the assessment; Pt understanding after a small pause during which she processed  Pt was amenable to talking w/ this staff  This worker asked Pt what in her words was the reason she was brought in this morning; Pt stated that she is "suicidal " Pt stated that she awoke in tears and had overwhelming thoughts to hurt self  Pt further elaborated that no one loves or cares about her and when questioned believed that this is true per her report  Further perseveration's included the fact that she believes that the staff are not "apprieciative" of her as much as she would like and that they are out to get her which she was not able to elaborate upon when pressed  Pt resides at [facility] and has for the last five years  Pt stated that she shares a room w/ someone and that this is stressful  Pt acknowledged that she has been inpatient more times than she is able to count  Pt receives all of her [address] through [employer] but remembers no names pertaining to providers  Pt endorses poor sleep and ability to maintain rest; no issues mentioned regarding diet  Pt denies visual hallucinations but endorsed verbal complaints of voices that are persecutory and put her down  Pt reports being very depressed  Pt has an extensive history of SI's and did admit to self abuse/harm by cutting  Pt denies any HI or aggressive tendencies now or in the past  Pt reports that she is a nonsmoker and that she denies any HX of D&A  Pt presented very flat and with a response lag; there was no change in countenance at anytime during the assessment  Pt did not appear to be in any overt distress at anytime during the assessment and did not express such   Pt is well know to this ED and has a Behavioral High Utilizer plan that was consulted prior to meeting w/ the Pt
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
## 2022-10-07 ENCOUNTER — OFFICE VISIT (OUTPATIENT)
Dept: URGENT CARE | Facility: CLINIC | Age: 52
End: 2022-10-07
Payer: MEDICARE

## 2022-10-07 VITALS
HEIGHT: 64 IN | OXYGEN SATURATION: 92 % | RESPIRATION RATE: 18 BRPM | TEMPERATURE: 96.9 F | HEART RATE: 88 BPM | BODY MASS INDEX: 45.93 KG/M2 | DIASTOLIC BLOOD PRESSURE: 55 MMHG | WEIGHT: 269 LBS | SYSTOLIC BLOOD PRESSURE: 118 MMHG

## 2022-10-07 DIAGNOSIS — N30.00 ACUTE CYSTITIS WITHOUT HEMATURIA: Primary | ICD-10-CM

## 2022-10-07 DIAGNOSIS — R35.0 FREQUENCY OF MICTURITION: ICD-10-CM

## 2022-10-07 LAB
SL AMB  POCT GLUCOSE, UA: NEGATIVE
SL AMB LEUKOCYTE ESTERASE,UA: ABNORMAL
SL AMB POCT BILIRUBIN,UA: ABNORMAL
SL AMB POCT BLOOD,UA: NEGATIVE
SL AMB POCT CLARITY,UA: CLEAR
SL AMB POCT COLOR,UA: YELLOW
SL AMB POCT KETONES,UA: NEGATIVE
SL AMB POCT NITRITE,UA: NEGATIVE
SL AMB POCT PH,UA: 6
SL AMB POCT SPECIFIC GRAVITY,UA: 1.03
SL AMB POCT URINE PROTEIN: NEGATIVE
SL AMB POCT UROBILINOGEN: 1

## 2022-10-07 PROCEDURE — 81002 URINALYSIS NONAUTO W/O SCOPE: CPT | Performed by: FAMILY MEDICINE

## 2022-10-07 PROCEDURE — G0463 HOSPITAL OUTPT CLINIC VISIT: HCPCS | Performed by: FAMILY MEDICINE

## 2022-10-07 PROCEDURE — 99213 OFFICE O/P EST LOW 20 MIN: CPT | Performed by: FAMILY MEDICINE

## 2022-10-07 PROCEDURE — 87086 URINE CULTURE/COLONY COUNT: CPT | Performed by: FAMILY MEDICINE

## 2022-10-07 RX ORDER — ZIPRASIDONE HYDROCHLORIDE 40 MG/1
CAPSULE ORAL
COMMUNITY
Start: 2022-09-28

## 2022-10-07 RX ORDER — BENZTROPINE MESYLATE 2 MG/1
TABLET ORAL
COMMUNITY
Start: 2022-08-18

## 2022-10-07 RX ORDER — IBUPROFEN 600 MG/1
TABLET ORAL
COMMUNITY
Start: 2022-09-06

## 2022-10-07 RX ORDER — HALOPERIDOL 5 MG/1
TABLET ORAL
COMMUNITY
Start: 2022-09-28

## 2022-10-07 RX ORDER — LURASIDONE HYDROCHLORIDE 80 MG/1
TABLET, FILM COATED ORAL
COMMUNITY
Start: 2022-09-28

## 2022-10-07 RX ORDER — DIVALPROEX SODIUM 500 MG/1
TABLET, DELAYED RELEASE ORAL
COMMUNITY
Start: 2022-09-28

## 2022-10-07 RX ORDER — HYDROXYZINE 50 MG/1
TABLET, FILM COATED ORAL
COMMUNITY
Start: 2022-08-05

## 2022-10-07 RX ORDER — FLUOXETINE HYDROCHLORIDE 40 MG/1
CAPSULE ORAL
COMMUNITY
Start: 2022-09-28

## 2022-10-07 RX ORDER — NITROFURANTOIN 25; 75 MG/1; MG/1
100 CAPSULE ORAL 2 TIMES DAILY
Qty: 10 CAPSULE | Refills: 0 | Status: SHIPPED | OUTPATIENT
Start: 2022-10-07 | End: 2022-10-12

## 2022-10-07 NOTE — PROGRESS NOTES
[facility]        NAME: Simi Parra is a 47 y o  female  : 1970    MRN: [id number]  DATE: 2022  TIME: 2:27 PM    Assessment and Plan   Frequency of micturition [R35 0]  1  Frequency of micturition  Urine culture    POCT urine dip         Patient Instructions       Follow up with PCP in 3-5 days  Proceed to  ER if symptoms worsen  Chief Complaint     Chief Complaint   Patient presents with    Urinary Frequency     Pt reports frequent urination, burning sensation when urinating, and pain in lower abdominal and lower back x 2 days  History of Present Illness       55-year-old female with 1-2 day history of urinary frequency, burning and pressure  Denies any back pain  Denies any fevers chills  Review of Systems   Review of Systems   Constitutional: Negative  Negative for fever  HENT: Negative  Eyes: Negative  Respiratory: Negative  Cardiovascular: Negative  Gastrointestinal: Negative  Genitourinary: Positive for dysuria and frequency  Musculoskeletal: Negative  Skin: Negative  Allergic/Immunologic: Negative  Neurological: Negative  Hematological: Negative  Psychiatric/Behavioral: Negative            Current Medications       Current Outpatient Medications:     aspirin 81 mg chewable tablet, Chew 1 tablet (81 mg total) daily, Disp: 30 tablet, Rfl: 0    atorvastatin (LIPITOR) 40 mg tablet, Take 1 tablet (40 mg total) by mouth daily with dinner, Disp: 30 tablet, Rfl: 0    benztropine (COGENTIN) 2 mg tablet, , Disp: , Rfl:     cholecalciferol (VITAMIN D3) 400 units tablet, Take 1 tablet (400 Units total) by mouth daily, Disp: 30 tablet, Rfl: 0    divalproex sodium (DEPAKOTE ER) 500 mg 24 hr tablet, Take 5 tablets (2,500 mg total) by mouth daily at bedtime (Patient taking differently: Take 2,000 mg by mouth daily at bedtime), Disp: 150 tablet, Rfl: 0    FLUoxetine (PROzac) 40 MG capsule, , Disp: , Rfl:     haloperidol (HALDOL) 5 mg tablet, , Disp: , Rfl:     hydrOXYzine HCL (ATARAX) 50 mg tablet, , Disp: , Rfl:     ibuprofen (MOTRIN) 600 mg tablet, , Disp: , Rfl:     Latuda 80 MG tablet, , Disp: , Rfl:     lisinopril (ZESTRIL) 20 mg tablet, Take 1 tablet (20 mg total) by mouth daily, Disp: 30 tablet, Rfl: 0    mirtazapine (REMERON) 45 MG tablet, Take 1 tablet (45 mg total) by mouth daily at bedtime, Disp: 30 tablet, Rfl: 0    prazosin (MINIPRESS) 2 mg capsule, Take 1 capsule (2 mg total) by mouth daily at bedtime, Disp: 30 capsule, Rfl: 0    senna-docusate sodium (SENOKOT S) 8 6-50 mg per tablet, Take 1 tablet by mouth 2 (two) times a day, Disp: 60 tablet, Rfl: 0    ziprasidone (GEODON) 40 mg capsule, , Disp: , Rfl:     divalproex sodium (DEPAKOTE) 500 mg EC tablet, , Disp: , Rfl:     FLUoxetine (PROzac) 20 mg capsule, Take 1 capsule (20 mg total) by mouth daily, Disp: 30 capsule, Rfl: 0    haloperidol (HALDOL) 10 mg tablet, Take 1 tablet (10 mg total) by mouth 2 (two) times a day Take 1 tablet in the morning and 1 tablet before bedtime, Disp: 60 tablet, Rfl: 0    Current Allergies     Allergies as of 10/07/2022 - Reviewed 09/21/2022   Allergen Reaction Noted    Fish oil - food allergy Other (See Comments) 11/29/2020    Fish-derived products - food allergy Hives 10/16/2020            The following portions of the patient's history were reviewed and updated as appropriate: allergies, current medications, past family history, past medical history, past social history, past surgical history and problem list      Past Medical History:   Diagnosis Date    Anxiety     B[facility] disorder ([facility] )     Bipolar affective disorder, depressed, severe ([facility] ) 10/10/2021    Borderline personality disorder ([facility] )     CAD (coronary artery disease)     Cognitive impairment     Depression     Dyslipidemia     GERD (gastroesophageal reflux disease)     Hypertension     Obesity     Psychiatric disorder     Psychiatric illness     PTSD (post-traumatic stress disorder)     Schizoaffective disorder (HCC)     Seizure ([address] )        Past Surgical History:   Procedure Laterality Date    APPENDECTOMY      PATIENT DENIES HAVING APPENDIX REMOVED    CHOLECYSTECTOMY      FOOT SURGERY Right        Family History   Problem Relation Age of Onset    Kidney cancer Mother     Cerebral aneurysm Father          Medications have been verified  Objective   /55   Pulse 88   Temp (!) 96 9 °F (36 1 °C)   Resp 18   Ht 5' 4" (1 626 m)   Wt 122 kg (269 lb)   SpO2 92%   BMI 46 17 kg/m²   No LMP recorded  Patient is postmenopausal        Physical Exam     Physical Exam  Vitals and nursing note reviewed  Constitutional:       Appearance: She is well-developed  HENT:      Head: Normocephalic  Eyes:      Pupils: Pupils are equal, round, and reactive to light  Cardiovascular:      Rate and Rhythm: Normal rate  Pulmonary:      Effort: Pulmonary effort is normal    Musculoskeletal:         General: Normal range of motion  Skin:     General: Skin is warm and dry  Neurological:      Mental Status: She is alert and oriented to person, place, and time

## 2022-10-09 LAB — BACTERIA UR CULT: NORMAL

## 2022-10-12 PROBLEM — B96.89 BACTERIAL CONJUNCTIVITIS OF BOTH EYES: Status: RESOLVED | Noted: 2021-01-09 | Resolved: 2022-10-12

## 2022-10-12 PROBLEM — H10.9 BACTERIAL CONJUNCTIVITIS OF BOTH EYES: Status: RESOLVED | Noted: 2021-01-09 | Resolved: 2022-10-12

## 2022-11-04 ENCOUNTER — HOSPITAL ENCOUNTER (EMERGENCY)
Facility: HOSPITAL | Age: 52
Discharge: HOME | End: 2022-11-05
Attending: EMERGENCY MEDICINE

## 2022-11-04 VITALS
DIASTOLIC BLOOD PRESSURE: 77 MMHG | TEMPERATURE: 98.7 F | HEART RATE: 106 BPM | WEIGHT: 273.37 LBS | OXYGEN SATURATION: 98 % | SYSTOLIC BLOOD PRESSURE: 138 MMHG | BODY MASS INDEX: 46.92 KG/M2 | RESPIRATION RATE: 18 BRPM

## 2022-11-04 DIAGNOSIS — F32.A DEPRESSION: ICD-10-CM

## 2022-11-04 DIAGNOSIS — F31.9 BIPOLAR DISORDER (HCC): ICD-10-CM

## 2022-11-04 DIAGNOSIS — Z00.8 ENCOUNTER FOR PSYCHOLOGICAL EVALUATION: Primary | ICD-10-CM

## 2022-11-04 LAB
ANION GAP SERPL CALCULATED.3IONS-SCNC: 8 MMOL/L (ref 4–13)
BUN SERPL-MCNC: 19 MG/DL (ref 5–25)
CALCIUM SERPL-MCNC: 8.8 MG/DL (ref 8.3–10.1)
CHLORIDE SERPL-SCNC: 102 MMOL/L (ref 96–108)
CO2 SERPL-SCNC: 29 MMOL/L (ref 21–32)
CREAT SERPL-MCNC: 0.65 MG/DL (ref 0.6–1.3)
GFR SERPL CREATININE-BSD FRML MDRD: 103 ML/MIN/1.73SQ M
GLUCOSE SERPL-MCNC: 103 MG/DL (ref 65–140)
POTASSIUM SERPL-SCNC: 4.2 MMOL/L (ref 3.5–5.3)
SODIUM SERPL-SCNC: 139 MMOL/L (ref 135–147)
VALPROATE SERPL-MCNC: 66 UG/ML (ref 50–100)

## 2022-11-04 RX ORDER — HYDROXYZINE HYDROCHLORIDE 25 MG/1
25 TABLET, FILM COATED ORAL EVERY 6 HOURS PRN
Status: DISCONTINUED | OUTPATIENT
Start: 2022-11-04 | End: 2022-11-05 | Stop reason: HOSPADM

## 2022-11-04 RX ORDER — HALOPERIDOL 5 MG/1
5 TABLET ORAL EVERY 8 HOURS PRN
Status: DISCONTINUED | OUTPATIENT
Start: 2022-11-04 | End: 2022-11-05 | Stop reason: HOSPADM

## 2022-11-04 RX ORDER — FLUOXETINE HYDROCHLORIDE 20 MG/1
40 CAPSULE ORAL DAILY
Status: DISCONTINUED | OUTPATIENT
Start: 2022-11-05 | End: 2022-11-05 | Stop reason: HOSPADM

## 2022-11-04 RX ORDER — ATORVASTATIN CALCIUM 40 MG/1
40 TABLET, FILM COATED ORAL
Status: DISCONTINUED | OUTPATIENT
Start: 2022-11-05 | End: 2022-11-05 | Stop reason: HOSPADM

## 2022-11-04 RX ORDER — LISINOPRIL 20 MG/1
20 TABLET ORAL DAILY
Status: DISCONTINUED | OUTPATIENT
Start: 2022-11-05 | End: 2022-11-05 | Stop reason: HOSPADM

## 2022-11-04 RX ORDER — ACETAMINOPHEN 325 MG/1
650 TABLET ORAL EVERY 8 HOURS PRN
Status: DISCONTINUED | OUTPATIENT
Start: 2022-11-04 | End: 2022-11-05 | Stop reason: HOSPADM

## 2022-11-04 RX ORDER — PRAZOSIN HYDROCHLORIDE 2 MG/1
2 CAPSULE ORAL
Status: DISCONTINUED | OUTPATIENT
Start: 2022-11-04 | End: 2022-11-05 | Stop reason: HOSPADM

## 2022-11-04 RX ORDER — VALPROIC ACID 250 MG/1
2000 CAPSULE, LIQUID FILLED ORAL
Status: DISCONTINUED | OUTPATIENT
Start: 2022-11-04 | End: 2022-11-05 | Stop reason: HOSPADM

## 2022-11-04 RX ORDER — LANOLIN ALCOHOL/MO/W.PET/CERES
3 CREAM (GRAM) TOPICAL
Status: DISCONTINUED | OUTPATIENT
Start: 2022-11-04 | End: 2022-11-05 | Stop reason: HOSPADM

## 2022-11-04 RX ORDER — ASPIRIN 81 MG/1
81 TABLET, CHEWABLE ORAL DAILY
Status: DISCONTINUED | OUTPATIENT
Start: 2022-11-05 | End: 2022-11-05 | Stop reason: HOSPADM

## 2022-11-04 RX ORDER — IBUPROFEN 400 MG/1
400 TABLET ORAL EVERY 8 HOURS PRN
Status: DISCONTINUED | OUTPATIENT
Start: 2022-11-04 | End: 2022-11-05 | Stop reason: HOSPADM

## 2022-11-04 RX ORDER — MIRTAZAPINE 15 MG/1
45 TABLET, FILM COATED ORAL
Status: DISCONTINUED | OUTPATIENT
Start: 2022-11-04 | End: 2022-11-05 | Stop reason: HOSPADM

## 2022-11-04 RX ADMIN — VALPROIC ACID 2000 MG: 250 CAPSULE, LIQUID FILLED ORAL at 22:02

## 2022-11-04 RX ADMIN — PRAZOSIN HYDROCHLORIDE 2 MG: 2 CAPSULE ORAL at 22:01

## 2022-11-04 RX ADMIN — HYDROXYZINE HYDROCHLORIDE 25 MG: 25 TABLET ORAL at 20:13

## 2022-11-04 RX ADMIN — MIRTAZAPINE 45 MG: 15 TABLET, FILM COATED ORAL at 22:01

## 2022-11-04 RX ADMIN — HALOPERIDOL 5 MG: 5 TABLET ORAL at 20:13

## 2022-11-04 NOTE — ED PROVIDER NOTES
nHistory  Chief Complaint   Patient presents with   • Psychiatric Evaluation     Pt reports suicidal thoughts and hearing voices telling her to harm herself for 2 days  Wanted to cut herself with a thumbtack but boyfriend called EMS  Patient is a 62-year-old female patient is a 62-year-old female with a history of obesity, hypertension, hyperlipidemia, anxiety, bipolar/borderline personality and schizoaffective disorder coming in today for suicidal thoughts  Patient states that she was diagnosed in 22 which she has been hearing voices  The voices come and go  She has no homicidal ideation, visual hallucinations  There is no change in auditory hallucinations  She states that she is suicidal multiple stab herself with a thumbtack  She feels safe at home and has no other complaints  Patient [facility] a facility where they take care of her  She has been taking her medications as prescribed      History provided by:  Patient and EMS personnel   used: No    Psychiatric Evaluation  Presenting symptoms: suicidal thoughts and suicidal threats    Degree of incapacity (severity): Unable to specify  Onset quality:  Unable to specify  Timing:  Unable to specify  Chronicity:  Recurrent  Context: not alcohol use, not drug abuse, not medication, not noncompliant, not recent medication change and not stressful life event    Treatment compliance: All of the time  Relieved by:  Nothing  Worsened by:  Nothing  Ineffective treatments:  None tried  Associated symptoms: feelings of worthlessness    Associated symptoms: no abdominal pain and no chest pain    Risk factors: hx of mental illness    Risk factors: no recent psychiatric admission        Prior to Admission Medications   Prescriptions Last Dose Informant Patient Reported? Taking?    FLUoxetine (PROzac) 20 mg capsule   No No   Sig: Take 1 capsule (20 mg total) by mouth daily   FLUoxetine (PROzac) 40 MG capsule   Yes No   Latuda 80 MG tablet   Yes No aspirin 81 mg chewable tablet   No No   Sig: Chew 1 tablet (81 mg total) daily   atorvastatin (LIPITOR) 40 mg tablet   No No   Sig: Take 1 tablet (40 mg total) by mouth daily with dinner   benztropine (COGENTIN) 2 mg tablet   Yes No   cholecalciferol (VITAMIN D3) 400 units tablet   No No   Sig: Take 1 tablet (400 Units total) by mouth daily   divalproex sodium (DEPAKOTE ER) 500 mg 24 hr tablet   No No   Sig: Take 5 tablets (2,500 mg total) by mouth daily at bedtime   Patient taking differently: Take 2,000 mg by mouth daily at bedtime   divalproex sodium (DEPAKOTE) 500 mg EC tablet   Yes No   Patient not taking: Reported on 10/7/2022   haloperidol (HALDOL) 10 mg tablet   No No   Sig: Take 1 tablet (10 mg total) by mouth 2 (two) times a day Take 1 tablet in the morning and 1 tablet before bedtime   haloperidol (HALDOL) 5 mg tablet   Yes No   hydrOXYzine HCL (ATARAX) 50 mg tablet   Yes No   ibuprofen (MOTRIN) 600 mg tablet   Yes No   lisinopril (ZESTRIL) 20 mg tablet   No No   Sig: Take 1 tablet (20 mg total) by mouth daily   mirtazapine (REMERON) 45 MG tablet   No No   Sig: Take 1 tablet (45 mg total) by mouth daily at bedtime   prazosin (MINIPRESS) 2 mg capsule   No No   Sig: Take 1 capsule (2 mg total) by mouth daily at bedtime   senna-docusate sodium (SENOKOT S) 8 6-50 mg per tablet   No No   Sig: Take 1 tablet by mouth 2 (two) times a day   ziprasidone (GEODON) 40 mg capsule   Yes No      Facility-Administered Medications: None       Past Medical History:   Diagnosis Date   • Anxiety    • Bipolar 1 disorder (HCC)    • Bipolar affective disorder, depressed, severe ([facility] [address] ) 10/10/2021   • Borderline personality disorder (HCC)    • CAD (coronary artery disease)    • Cognitive impairment    • Depression    • Dyslipidemia    • GERD (gastroesophageal reflux disease)    • Hypertension    • Obesity    • Psychiatric disorder    • Psychiatric illness    • PTSD (post-traumatic stress disorder)    • Schizoaffective disorder ([facility] [address] )    • Seizure [facility])        Past Surgical History:   Procedure Laterality Date   • APPENDECTOMY      PATIENT DENIES HAVING APPENDIX REMOVED   • CHOLECYSTECTOMY     • FOOT SURGERY Right        Family History   Problem Relation Age of Onset   • Kidney cancer Mother    • Cerebral aneurysm Father      I have reviewed and agree with the history as documented  E-Cigarette/Vaping   • E-Cigarette Use Never User      E-Cigarette/Vaping Substances   • Nicotine No    • THC No    • CBD No    • Flavoring No    • Other No    • Unknown No      Social History     Tobacco Use   • Smoking status: Former Smoker   • Smokeless tobacco: Never Used   • Tobacco comment: Pt stated "smokes when stressed"   Vaping Use   • Vaping Use: Never used   Substance Use Topics   • Alcohol use: Yes     Comment: occasionally   • Drug use: Not Currently       Review of Systems   Constitutional: Negative  Negative for chills and fever  HENT: Negative  Negative for ear pain and sore throat  Eyes: Negative for pain and visual disturbance  Respiratory: Negative  Negative for cough and shortness of breath  Cardiovascular: Negative  Negative for chest pain and palpitations  Gastrointestinal: Negative  Negative for abdominal pain and vomiting  Genitourinary: Negative  Negative for dysuria and hematuria  Musculoskeletal: Negative  Negative for arthralgias and back pain  Skin: Negative  Negative for color change and rash  Neurological: Negative for seizures and syncope  Hematological: Negative  Psychiatric/Behavioral: Positive for suicidal ideas  All other systems reviewed and are negative  Physical Exam  Physical Exam  Vitals and nursing note reviewed  Constitutional:       General: She is not in acute distress  Appearance: She is well-developed  Comments: Patient appears older than stated age   HENT:      Head: Normocephalic and atraumatic  Comments: Patient maintaining airway and secretions   No stridor   No brawniness under tongue  Mouth/Throat:      Mouth: Mucous membranes are moist    Eyes:      Extraocular Movements: Extraocular movements intact  Conjunctiva/sclera: Conjunctivae normal       Pupils: Pupils are equal, round, and reactive to light  Cardiovascular:      Rate and Rhythm: Normal rate and regular rhythm  Heart sounds: No murmur heard  Pulmonary:      Effort: Pulmonary effort is normal  No respiratory distress  Breath sounds: Normal breath sounds  Abdominal:      Palpations: Abdomen is soft  Tenderness: There is no abdominal tenderness  Musculoskeletal:         General: Normal range of motion  Cervical back: Neck supple  Skin:     General: Skin is warm and dry  Capillary Refill: Capillary refill takes less than 2 seconds  Neurological:      General: No focal deficit present  Mental Status: She is alert and oriented to person, place, and time  GCS: GCS eye subscore is 4  GCS verbal subscore is 5  GCS motor subscore is 6  Cranial Nerves: Cranial nerves are intact  Sensory: Sensation is intact  Motor: Motor function is intact  Psychiatric:         Attention and Perception: Attention normal          Mood and Affect: Affect is flat  Speech: Speech is delayed  Behavior: Behavior normal  Behavior is cooperative           Vital Signs  ED Triage Vitals [11/04/22 1916]   Temperature Pulse Respirations Blood Pressure SpO2   98 7 °F (37 1 °C) (!) 120 20 123/58 94 %      Temp src Heart Rate Source Patient Position - Orthostatic VS BP Location FiO2 (%)   -- Monitor Lying Left arm --      Pain Score       --           Vitals:    11/04/22 1916 11/04/22 2204   BP: 123/58 138/77   Pulse: (!) 120 (!) 106   Patient Position - Orthostatic VS: Lying Sitting         Visual Acuity      ED Medications  Medications   haloperidol (HALDOL) tablet 5 mg (5 mg Oral Given 11/4/22 2013)   hydrOXYzine HCL (ATARAX) tablet 25 mg (25 mg Oral Given 11/4/22 2013)   aspirin chewable tablet 81 mg (has no administration in time range)   atorvastatin (LIPITOR) tablet 40 mg (has no administration in time range)   valproic acid (DEPAKENE) capsule 2,000 mg (2,000 mg Oral Given 11/4/22 2202)   FLUoxetine (PROzac) capsule 40 mg (has no administration in time range)   lisinopril (ZESTRIL) tablet 20 mg (has no administration in time range)   prazosin (MINIPRESS) capsule 2 mg (2 mg Oral Given 11/4/22 2201)   mirtazapine (REMERON) tablet 45 mg (45 mg Oral Given 11/4/22 2201)   acetaminophen (TYLENOL) tablet 650 mg (has no administration in time range)   melatonin tablet 3 mg (has no administration in time range)   ibuprofen (MOTRIN) tablet 400 mg (has no administration in time range)       Diagnostic Studies  Results Reviewed     Procedure Component Value Units Date/Time    Fingerstick Glucose (POCT) [[id number]]  (Normal) Collected: 11/04/22 2003    Lab Status: Final result Updated: 11/05/22 0019     POC Glucose 104 mg/dl     Valproic acid level, total [[id number]]  (Normal) Collected: 11/04/22 2013    Lab Status: Final result Specimen: Blood from Arm, Right Updated: 11/04/22 2056     Valproic Acid, Total 66 ug/mL     Basic metabolic panel [[id number]] Collected: 11/04/22 2013    Lab Status: Final result Specimen: Blood from Arm, Right Updated: 11/04/22 2033     Sodium 139 mmol/L      Potassium 4 2 mmol/L      Chloride 102 mmol/L      CO2 29 mmol/L      ANION GAP 8 mmol/L      BUN 19 mg/dL      Creatinine 0 65 mg/dL      Glucose 103 mg/dL      Calcium 8 8 mg/dL      eGFR 103 ml/min/1 73sq m     Narrative:      Cammie guidelines for Chronic Kidney Disease (CKD):   •  Stage 1 with normal or high GFR (GFR > 90 mL/min/1 73 square meters)  •  Stage 2 Mild CKD (GFR = 60-89 mL/min/1 73 square meters)  •  Stage 3A Moderate CKD (GFR = 45-59 mL/min/1 73 square meters)  •  Stage 3B Moderate CKD (GFR = 30-44 mL/min/1 73 square meters)  •  Stage 4 Severe CKD (GFR = 15-29 mL/min/1 73 square meters)  •  Stage 5 End Stage CKD (GFR <15 mL/min/1 73 square meters)  Note: GFR calculation is accurate only with a steady state creatinine                 No orders to display              Procedures  Procedures         ED Course  ED Course as of 11/05/22 0043   [employer] Nov 04, 2022   1929 Patient is a 31-year-old female coming in today complaining of suicidal thoughts  Patient has a high utilizer plan will review  Will me patient with crisis      Disclosure: Voice to text software was used in the preparation of this document and could have resulted in translational errors       Occasional wrong word or "sound a like" substitutions may have occurred due to the inherent limitations of voice recognition software   Read the chart carefully and recognize, using context, where substitutions have occurred         1945 Met with patient with crisis  We did review hydrolyzed or plan  Patient had greater than 15 admissions last year  Patient does state that no one likes her and that is why she wants to hurt herself  Crisis will call    2031 Crisis talked with [facility] - hospitals refer to note  Patient has established care does not meet inpatient criteria  Unknown when patient will be picked up  Will place patient's daily meds and while waiting   2051 Patient resting in bed tolerating p o  Well   2058 Patient's labs are stable  Sat Nov 05, 2022   0018 Patient sleeping  Patient has been calm and cooperative  Will remove one-to-one as patient has made no attempts to hurt herself or leave  Staff has made multiple attempts at contacting facility with no answer   9518 Patient staff from group home is here for discharge                                SBIRT 20yo+    Flowsheet Row Most Recent Value   SBIRT (25 yo +)    In order to provide better care to our patients, we are screening all of our patients for alcohol and drug use   Would it be okay to ask you these screening questions? Yes Filed at: 11/04/2022 2153   Initial Alcohol Screen: US AUDIT-C     1  How often do you have a drink containing alcohol? 1 Filed at: 11/04/2022 2153   2  How many drinks containing alcohol do you have on a typical day you are drinking? 1 Filed at: 11/04/2022 2153   3b  FEMALE Any Age, or MALE 65+: How often do you have 4 or more drinks on one occassion? 0 Filed at: 11/04/2022 2153   Audit-C Score 2 Filed at: 11/04/2022 2153   DAT: How many times in the past year have you    Used an illegal drug or used a prescription medication for non-medical reasons? Never Filed at: 11/04/2022 2153                    MDM    Disposition  Final diagnoses:   Encounter for psychological evaluation   Depression   Bipolar disorder [facility])     Time reflects when diagnosis was documented in both MDM as applicable and the Disposition within this note     Time User Action Codes Description Comment    11/4/2022  8:36 PM Paul Amaya Add [Z00 8] Encounter for psychological evaluation     11/4/2022  8:36 PM Paul Amaya Add Sabinus Calico  A] Depression     11/4/2022  8:36 PM Paul Amaya Add [F31 9] Bipolar disorder [facility])       ED Disposition     ED Disposition   Discharge    Condition   Stable    Date/Time   Fri Nov 4, 2022  8:36 PM    Comment   Zoila Pedro discharge to home/self care  Follow-up Information     Follow up With Specialties Details Why Contact Info    Mary Wolfe MD Internal Medicine Schedule an appointment as soon as possible for a visit in 3 days  [address] [place], [place] [phone]            Patient's Medications   Discharge Prescriptions    No medications on file       No discharge procedures on file      PDMP Review       Value Time User    PDMP Reviewed  Yes 5/6/2022 10:38 AM Galina Manriquez MD          ED Provider  Electronically Signed by           Maya Garvey DO  11/05/22 0793

## 2022-11-05 VITALS
OXYGEN SATURATION: 99 % | DIASTOLIC BLOOD PRESSURE: 76 MMHG | HEART RATE: 95 BPM | RESPIRATION RATE: 16 BRPM | BODY MASS INDEX: 47.8 KG/M2 | SYSTOLIC BLOOD PRESSURE: 145 MMHG | HEIGHT: 64 IN | TEMPERATURE: 98.2 F | WEIGHT: 280 LBS

## 2022-11-05 DIAGNOSIS — R45.851 SUICIDAL IDEATION: Primary | ICD-10-CM

## 2022-11-05 DIAGNOSIS — R44.0 AUDITORY HALLUCINATIONS: ICD-10-CM

## 2022-11-05 LAB
AMPHETAMINES SERPL QL SCN: NEGATIVE
BARBITURATES UR QL: NEGATIVE
BENZODIAZ UR QL: NEGATIVE
COCAINE UR QL: NEGATIVE
ETHANOL EXG-MCNC: 0 MG/DL
GLUCOSE SERPL-MCNC: 104 MG/DL (ref 65–140)
METHADONE UR QL: NEGATIVE
OPIATES UR QL SCN: NEGATIVE
OXYCODONE+OXYMORPHONE UR QL SCN: NEGATIVE
PCP UR QL: NEGATIVE
SARS-COV-2 RNA RESP QL NAA+PROBE: NEGATIVE
THC UR QL: NEGATIVE

## 2022-11-05 NOTE — ED NOTES
AVS reviewed with group home staff who picked up patient for transport back to group [place]  Staff verbalized understanding of d/c instructions  No questions or concerns at this time   Pt left ambulatory with steady gait, alert and oriented at time of d/c     Lanny Carter RN  11/05/22 011
Call placed to [facility] in regard to patient  Estuardo/Sanam, who are listed in patient's [address] are not currently in the building  Contact phone number for [facility] Crisis left with [facility] [place] staff to have someone reach back out to the ED      Yazmin Tomy  Crisis Intervention Specialist II  11/04/22
Called Blake Donovan repeatedly and attempted every phone line option trying to get someone to answer  No one answered the med room line, the "speak to a staff member immediately" line, or even the residents' line  Left a message on the 's line explaining attempts have been made for hours to get someone to come  this patient, and requesting someone return the call right away to discuss their transport plans  The voice mail message says the Adminitrator's name is Kirkland Habermann  The message was left for him 
Patient presents to the Emergency Department via EMS reporting auditory hallucinations and thoughts of self harm  Patient is well known to the  network, and is currently on a [employer]  Patient reports that she hears voices that tell her that no one likes her, and has been hearing voices at baseline since she was age 23  When asked if she believes the voices patient appears unsure  Patient reports wanting to harm herself with whatever she can find, and reported to the provider that she would harm herself with thumbtacks  Patient unable to identify any recent triggering events that would make her feel the way she does  Patient reports that she feels safe in the facility where she lives, and gets along well with the staff  Patient reports she takes her medications as prescribed  Patient reports a recent 30 day on an inpatient unit approximately one month ago  Patient unable to identify how she has improved throughout her inpatient stays, and reports she just wants to go away for a few days   Patient has an established care and treatment team at her residence, and a call was placed there to collect additional collateral      R Bertha Maria  II  11/04/22
Spoke to Tay from [facility] that patient had behavioral issues at the group home, and was refusing to follow certain staff directives  Patient took thumb tacks from the wall, and would not give them to one staff, but did surrender them to Tay Cross reports that they tried everything they could to prevent the patient coming to the ED, but assumed that they would receive a phone call from the ED informing them that the patient does not meet inpatient criteria and someone would need to come pick her up  Tay reports that they do not have staff available to  the patient at this time, but will have more staff coming in later in the evening  Staff from [facility] to inform ED when they are able to come and get the patient      Ramon Dean  Crisis Intervention Specialist II  11/04/22
no

## 2022-11-05 NOTE — ED NOTES
Call from [facility] at [facility]  [employer]  Patient was doing well today until dinner time when she expressed desire to go to the hospital  Sanam is hoping we can divert her from inpatient and send her back to the facility  Can call back Sanam at [phone] and will be available until 11pm  After 11pm call the emergency line at [phone] to reach staff

## 2022-11-05 NOTE — ED NOTES
Crisis Worker met with Patient and explained that she is a high utilize of ed and Crisis talked with [facility] and they explained that she was doing fine until [facility] and Patient gets lonely and wants to go to Ed   [facility] is reporting that she would not be able to Slice her throat with Glass because staff have cleaned out her room and she only uses plastic utensils at this time due her current baseline of hurting herself'  Patient is ok with going back to [facility] with telephone numbers for crisis intervention and the warm line to talk with professionals during these times    Olu Sweeney

## 2022-11-05 NOTE — ED PROVIDER NOTES
History  Chief Complaint   Patient presents with   • Anxiety     Pt reports she has been having boyfriend issues, she keeps thinking that he is going to cheat on her  Pt states that staff at [facility] are not as friendly as she feels they should be, mostly because they don't support the engagement of her and Argeliawone because they have only been dating for two weeks  Pt knows this is the one and is sad because staff at her home is not supportive  Pt denies SI/HI  Has vauge plan to cut her throat so that she would "bleed enough so everyone would feel bad"  47year old female brought by EMS for evaluation of suicidal ideation associated with auditory hallucinations  Patient states she hears voices which tell her that her boyfriend is going to leave her and that she should kill herself by cutting her throat with broken glass  Patient denies any self harming behavior  She states the voices have been worsening over the past 2 days  She currently resides at [facility]  She states she has been compliant with her medications, but does not feel that they are helping  Patient was seen at [facility] yesterday for same  She has a behavioral health high utilizer plan  Patient was last admitted to psychiatry 7/27/22-8/3/22  She reports prior suicide attempt by overdose 1 year ago  Patient has no current medical complaints and states she feels otherwise well  History provided by:  Patient and medical records  Anxiety  Presenting symptoms: suicidal thoughts    Duration:  2 days  Timing:  Constant  Progression:  Worsening  Chronicity:  Chronic  Treatment compliance:   All of the time  Time since last psychoactive medication taken:  2 hours  Relieved by:  Nothing  Worsened by:  Nothing  Ineffective treatments:  Antipsychotics and mood stabilizers  Risk factors: hx of mental illness and hx of suicide attempts        Prior to Admission Medications   Prescriptions Last Dose Informant Patient Reported? Taking?    FLUoxetine (PROzac) 20 mg capsule   No No   Sig: Take 1 capsule (20 mg total) by mouth daily   FLUoxetine (PROzac) 40 MG capsule   Yes No   Latuda 80 MG tablet   Yes No   aspirin 81 mg chewable tablet   No No   Sig: Chew 1 tablet (81 mg total) daily   atorvastatin (LIPITOR) 40 mg tablet   No No   Sig: Take 1 tablet (40 mg total) by mouth daily with dinner   benztropine (COGENTIN) 2 mg tablet   Yes No   cholecalciferol (VITAMIN D3) 400 units tablet   No No   Sig: Take 1 tablet (400 Units total) by mouth daily   divalproex sodium (DEPAKOTE ER) 500 mg 24 hr tablet   No No   Sig: Take 5 tablets (2,500 mg total) by mouth daily at bedtime   Patient taking differently: Take 2,000 mg by mouth daily at bedtime   divalproex sodium (DEPAKOTE) 500 mg EC tablet   Yes No   Patient not taking: Reported on 10/7/2022   haloperidol (HALDOL) 10 mg tablet   No No   Sig: Take 1 tablet (10 mg total) by mouth 2 (two) times a day Take 1 tablet in the morning and 1 tablet before bedtime   haloperidol (HALDOL) 5 mg tablet   Yes No   hydrOXYzine HCL (ATARAX) 50 mg tablet   Yes No   ibuprofen (MOTRIN) 600 mg tablet   Yes No   lisinopril (ZESTRIL) 20 mg tablet   No No   Sig: Take 1 tablet (20 mg total) by mouth daily   mirtazapine (REMERON) 45 MG tablet   No No   Sig: Take 1 tablet (45 mg total) by mouth daily at bedtime   prazosin (MINIPRESS) 2 mg capsule   No No   Sig: Take 1 capsule (2 mg total) by mouth daily at bedtime   senna-docusate sodium (SENOKOT S) 8 6-50 mg per tablet   No No   Sig: Take 1 tablet by mouth 2 (two) times a day   ziprasidone (GEODON) 40 mg capsule   Yes No      Facility-Administered Medications: None       Past Medical History:   Diagnosis Date   • Anxiety    • Bipolar 1 disorder (HCC)    • Bipolar affective disorder, depressed, severe ([facility] [address] ) 10/10/2021   • Borderline personality disorder (HCC)    • CAD (coronary artery disease)    • Cognitive impairment    • Depression    • Dyslipidemia    • GERD (gastroesophageal reflux disease)    • Hypertension    • Obesity    • Psychiatric disorder    • Psychiatric illness    • PTSD (post-traumatic stress disorder)    • Schizoaffective disorder (HCC)    • Seizure (HCC)        Past Surgical History:   Procedure Laterality Date   • APPENDECTOMY      PATIENT DENIES HAVING APPENDIX REMOVED   • CHOLECYSTECTOMY     • FOOT SURGERY Right        Family History   Problem Relation Age of Onset   • Kidney cancer Mother    • Cerebral aneurysm Father      I have reviewed and agree with the history as documented  E-Cigarette/Vaping   • E-Cigarette Use Never User      E-Cigarette/Vaping Substances   • Nicotine No    • THC No    • CBD No    • Flavoring No    • Other No    • Unknown No      Social History     Tobacco Use   • Smoking status: Former Smoker   • Smokeless tobacco: Never Used   • Tobacco comment: Pt stated "smokes when stressed"   Vaping Use   • Vaping Use: Never used   Substance Use Topics   • Alcohol use: Yes     Comment: occasionally   • Drug use: Not Currently       Review of Systems   Unable to perform ROS: Psychiatric disorder   Psychiatric/Behavioral: Positive for suicidal ideas  Physical Exam  Physical Exam  Vitals and nursing note reviewed  Constitutional:       General: She is not in acute distress  Appearance: She is well-developed  She is not toxic-appearing or diaphoretic  HENT:      Head: Normocephalic and atraumatic  Right Ear: External ear normal       Left Ear: External ear normal       Nose: Nose normal    Eyes:      General: No scleral icterus  Pulmonary:      Effort: Pulmonary effort is normal  No respiratory distress  Abdominal:      General: There is no distension  Musculoskeletal:         General: No deformity  Normal range of motion  Skin:     General: Skin is warm and dry  Findings: No rash  Neurological:      General: No focal deficit present  Mental Status: She is alert     Psychiatric:         Attention and Perception: She perceives auditory hallucinations  Mood and Affect: Affect is flat  Thought Content: Thought content includes suicidal ideation  Thought content does not include homicidal ideation  Vital Signs  ED Triage Vitals [11/05/22 1844]   Temperature Pulse Respirations Blood Pressure SpO2   98 2 °F (36 8 °C) 95 16 145/76 99 %      Temp Source Heart Rate Source Patient Position - Orthostatic VS BP Location FiO2 (%)   Oral Monitor Lying Right arm --      Pain Score       No Pain           Vitals:    11/05/22 1844   BP: 145/76   Pulse: 95   Patient Position - Orthostatic VS: Lying         Visual Acuity      ED Medications  Medications - No data to display    Diagnostic Studies  Results Reviewed     Procedure Component Value Units Date/Time    COVID only [[id number]]  (Normal) Collected: 11/05/22 1905    Lab Status: Final result Specimen: Nares from Nose Updated: 11/05/22 1946     SARS-CoV-2 Negative    Narrative:      FOR PEDIATRIC PATIENTS - copy/paste COVID Guidelines URL to browser: [email or web address]  COPsyncx    SARS-CoV-2 assay is a Nucleic Acid Amplification assay intended for the  qualitative detection of nucleic acid from SARS-CoV-2 in nasopharyngeal  swabs  Results are for the presumptive identification of SARS-CoV-2 RNA  Positive results are indicative of infection with SARS-CoV-2, the virus  causing COVID-19, but do not rule out bacterial infection or co-infection  with other viruses  Laboratories within the [place] and its  territories are required to report all positive results to the appropriate  public health authorities  Negative results do not preclude SARS-CoV-2  infection and should not be used as the sole basis for treatment or other  patient management decisions  Negative results must be combined with  clinical observations, patient history, and epidemiological information    This test has not been FDA cleared or approved  This test has been authorized by FDA under an Emergency Use Authorization  (EUA)  This test is only authorized for the duration of time the  declaration that circumstances exist justifying the authorization of the  emergency use of an in vitro diagnostic tests for detection of SARS-CoV-2  virus and/or diagnosis of COVID-19 infection under section 564(b)(1) of  the Act, 21 U  S C  233QEG-1(Y)(7), unless the authorization is terminated  or revoked sooner  The test has been validated but independent review by FDA  and CLIA is pending  Test performed using [employer] GeneXpert: This RT-PCR assay targets N2,  a region unique to SARS-CoV-2  A conserved region in the E-gene was chosen  for pan-Sarbecovirus detection which includes SARS-CoV-2  According to CMS-2020-01-R, this platform meets the definition of high-throughput technology  Rapid drug screen, urine [[id number]]  (Normal) Collected: 11/05/22 1905    Lab Status: Final result Specimen: Urine, Clean Catch Updated: 11/05/22 1946     Amph/Meth UR Negative     Barbiturate Ur Negative     Benzodiazepine Urine Negative     Cocaine Urine Negative     Methadone Urine Negative     Opiate Urine Negative     PCP Ur Negative     THC Urine Negative     Oxycodone Urine Negative    Narrative:      FOR MEDICAL PURPOSES ONLY  IF CONFIRMATION NEEDED PLEASE CONTACT THE LAB WITHIN 5 DAYS      Drug Screen Cutoff Levels:  AMPHETAMINE/METHAMPHETAMINES  1000 ng/mL  BARBITURATES     200 ng/mL  BENZODIAZEPINES     200 ng/mL  COCAINE      300 ng/mL  METHADONE      300 ng/mL  OPIATES      300 ng/mL  PHENCYCLIDINE     25 ng/mL  THC       50 ng/mL  OXYCODONE      100 ng/mL    POCT alcohol breath test [[id number]]  (Normal) Resulted: 11/05/22 1900    Lab Status: Final result Updated: 11/05/22 1900     EXTBreath Alcohol 0 000                 No orders to display              Procedures  Procedures         ED Course         CRAFFT    Flowsheet Row Most Recent Value   SBIRT (12-20 yo)    In order to provide better care to our patients, we are screening all of our patients for alcohol and drug use  Would it be okay to ask you these screening questions? No Filed at: 11/05/2022 1846                            SBIRT 20yo+    Flowsheet Row Most Recent Value   SBIRT (23 yo +)    In order to provide better care to our patients, we are screening all of our patients for alcohol and drug use  Would it be okay to ask you these screening questions? No Filed at: 11/05/2022 1847                    MDM  Number of Diagnoses or Management Options  Auditory hallucinations: new and requires workup  Suicidal ideation: new and requires workup  Diagnosis management comments: 47year old female presents for evaluation of suicidal ideation  Several similar presentations  Currently residing in group home where she is closely monitored  Crisis consulted  Patient agreeable to returning to her group home  No criteria for 302  Amount and/or Complexity of Data Reviewed  Clinical lab tests: ordered and reviewed    Patient Progress  Patient progress: stable      Disposition  Final diagnoses:   Suicidal ideation   Auditory hallucinations     Time reflects when diagnosis was documented in both MDM as applicable and the Disposition within this note     Time User Action Codes Description Comment    11/5/2022  7:18 PM Ankita Arch Add [V05 045] Suicidal ideation     11/5/2022  7:18 PM Ankita Arch Add [R44 0] Auditory hallucinations       ED Disposition     ED Disposition   Discharge    Condition   Stable    Date/Time   Sat Nov 5, 2022  7:18 PM    Comment   Pranav Cummings discharge to home/self care                 Follow-up Information     Follow up With Specialties Details Why Contact Info Additional Devon Valdez [address] Emergency [facility] Emergency Medicine Go to  If symptoms worsen [address] [place],[address]D [id number]  [address] [facility], [address] [place] [address], Luige Edgard 10          Patient's Medications   Discharge Prescriptions    No medications on file       No discharge procedures on file      PDMP Review       Value Time User    PDMP Reviewed  Yes 5/6/2022 10:38 AM Jakob Nevarez MD          ED Provider  Electronically Signed by           Emanuel Olivarez MD  11/05/22 0522

## 2022-11-05 NOTE — DISCHARGE INSTRUCTIONS
This writer discussed the patients current presentation and recommended discharge plan with Dr Erick Koehler  They agree with the patient being discharged at this time with the following plan:    Patient is to continue with treatment at [facility] and [address] staff, care coordinators and therapist should develop a crisis plan with patient and healthy coping skills in response to her distressful feelings  Patient to continue with established therapy and psychiatry, and take medications as prescribed  The patient was Instructed to follow up with their PCP  This writer discussed discharge plans with the patient and facility staff who agree understands the discharge plans  SAFETY PLAN  Warning Signs (thoughts, images, mood, behavior, situations) of a potential crisis: Impulsivity, ignoring staff      Coping Skills (what can I do to take my mind off the problem, or to keep myself safe):  Talk with SO, talk with therapist, preferred activities      Outside Support (who can I reach out to for support and help): Zacarias Hernandez, therapist, SO        National Suicide Prevention Hotline:  [facility] [address] [place] [address]: [facility]: [address] [phone] - Crisis   [phone] - Peer [address] [facility] (1-9pm daily)  [phone] - Teen Support Talk Line (1-9pm daily)  [address] [facility]) [phone] - [address] [facility]

## 2022-11-05 NOTE — ED NOTES
Corbin Bouquet Corbin Bouquet Corbin Bouquet Corbin Bouquet Corbin Bouquet Corbin Bouquet This writer discussed the patients current presentation and recommended discharge plan with Ghada Celaya  They agree with the patient being discharged at this time with rr information about [address] [place] [address] and the warm line     The patient was Instructed to follow up with their therapist and psychiatrist    The patient was provided with  information for:   Warm line and crisis intervention     TThe patient declined to complete a safety plan however a blank plan was provided for future use  In addition, the patient was instructed to call [facility] crisis, other crisis services, 91 or to go to the nearest ER immediately if their situation changes at any time       Discussion was held with [facility]   Regarding talk lines   This writer discussed discharge plans with the addie       SAFETY PLAN  Warning Signs   Coping Skills talk with staff at [facility]      Outside Support  Warm line @ [address]:  [address]: [facility]: [place] [phone] - [address] [facility] [address] [place] [address] [address] [phone] - Crisis   [phone] - Peer [address] [facility] (1-9pm daily)  [phone] - Teen Support Talk Line (1-9pm daily)  [address] ([place]) [phone] - [facility] [facility]

## 2022-11-06 ENCOUNTER — HOSPITAL ENCOUNTER (EMERGENCY)
Facility: HOSPITAL | Age: 52
Discharge: HOME | End: 2022-11-07
Attending: EMERGENCY MEDICINE

## 2022-11-06 VITALS
OXYGEN SATURATION: 95 % | TEMPERATURE: 97.9 F | HEART RATE: 81 BPM | SYSTOLIC BLOOD PRESSURE: 122 MMHG | RESPIRATION RATE: 18 BRPM | DIASTOLIC BLOOD PRESSURE: 64 MMHG

## 2022-11-06 DIAGNOSIS — Z72.89 SELF-MUTILATION: Primary | ICD-10-CM

## 2022-11-06 DIAGNOSIS — R45.851 SUICIDAL IDEATION: ICD-10-CM

## 2022-11-06 RX ORDER — GINSENG 100 MG
2 CAPSULE ORAL ONCE
Status: DISCONTINUED | OUTPATIENT
Start: 2022-11-06 | End: 2022-11-07 | Stop reason: HOSPADM

## 2022-11-06 NOTE — Clinical Note
Tommye Line should be transferred out to [facility] and has been medically cleared

## 2022-11-07 NOTE — DISCHARGE INSTRUCTIONS
Make sure you are taking your medications and seeing your therapist as prescribed  If you have thoughts of suicidal ideation or self harm, call the crisis line  Return to the ER if you develop auditory or visual hallucinations, thoughts to kill yourself or others with a plan, attempt to hurt yourself or others, chest pain, shortness of breath, seizure like activity

## 2022-11-07 NOTE — ED NOTES
Pt presents to the  ED this evening after visiting multiple ED's in the last several days including this ED w/ in the last 24 hours  Pt's identified issue this evening is a recurrent theme of her boyfriend leaving her and not having anything to justify this belief  Pt superficially cut self this evening on both wrists as is also par for her behavior  This worker introduced self and role of the assessment; Pt looked at this worker befuddled and remembered speaking less than a month ago or so  This worker asked what brought her to ED this evening, Pt stated "same thing as usual " This worker asked Pt how she was feeling and she stated that she is feeling the same as she has the last few days  This worker asked if Pt was having any SI/HI and she denied but stated that she was cutting due to being depressed  Pt stated that she is taking her medications as prescribed and that she does attend therapy  Her HX of hospitalization is extensive  Pt currently resides at [facility] and is unemployed  Pt has no access to firerarms  This worker explained to Pt that at this time after reviewing Pt's behavioral action plan which included speaking to the staff at [facility] that she be discharged  This worker stated that at her last ED visit to this site which was on 11/5 she was given an action plan to follow in lieu of calling 911 immediately when she is feeling this way; Pt stated that she had "no idea" what it was and appeared to imply this was the first she heard of it  Pt appeared somewhat confused but not at all upset  Pt's response lag was evident but Pt very nonchalantly stated that that was fine and was observed to ask for clothing  This worker contacted [facility] for a staff to come and retrieve Pt and confirmed w/ the attending that there were no concerns for discharge at this time implied by facility staff

## 2022-11-07 NOTE — ED NOTES
PC to [address]  Informed her that the Pt is here and that in accordance w/ High Utilizer plan I needed to make collateral contact for staff recommendations  [place] stated that she was aware that Pt has made multiple ED rounds in the last couple of days and that she has been discharged from wherever she has presented  [place] stated that there was no reason that the Pt should be admitted and that this worker can contact [phone] and someone will be dispatched to pick her up  This worker stated that he was going to meet w/ the Pt and that he would then CB to arrange P/U time  This worker asked if there were any concerns at this time and that the Pt was just supposed to see the psychiatrist about medications but was unaware if there was an issue but that it did not merit hospitalization

## 2022-11-17 ENCOUNTER — DOCUMENTATION (OUTPATIENT)
Dept: CASE MANAGEMENT | Facility: HOSPITAL | Age: 52
End: 2022-11-17

## 2022-11-17 NOTE — PROGRESS NOTES
Behavioral Health High Utilizer       Patient Adryan Segundo MRN:  [id number]         : 1970     Age: 47 y o  Sex: female   Utilization History:  (# of ED visits & IP admits; reasons)     Pt had approx 16 ED visits within the past year (1409-2993) related to SI's with a plan, feelings that no one loves or cares about her, self-harm superficial scratches with a thumbtack, and calling 911 on herself from her group home setting      Pt had three 30-day behavioral health readmissions within the past year (9847-5210) related to SI's with a plan, auditory hallucinations, or self-harm behaviors such as scratching herself with a thumbtack or plastic fork  Treatment Recommendations & Presentation:  Presentation in the ED: Upon ED visit, pt will often state that no one cares about her or loves her; she may or may not present with SI or a plan  She may report that she just had a fight with her boyfriend (who resides at [facility]) or that he doesn't love her  Pt may report SI's with a plan to cut her wrists, jump or walk in front of a car or traffic, hang herself, or drink bleach  Pt has repeatedly stated that she will kill herself with a thumbtack and has superficially scratched or poked herself with a thumbtack on multiple occasions and has also utilized a plastic fork to scratch herself  Pt also states she does not like living at her group home and does not want to be discharged from inpt stays  Pt will call 911 on herself in order for EMS to bring her to the ED and tends to do this at certain times when group home staffing is more limited  Pt's boyfriend lives at [facility], if he is being hospitalized then pt also wants hospitalization   Please call Sanam from [facility] when pt comes to ED [phone] or Lorrie Esquivel [phone]         ED Recommendations: First line of contact is Care Coordinator Sanam from [facility] [phone] or Estuardo from [facility] at [phone] or Therapist Karla Miller from [facility] at [phone]  They can develop and initiate an action plan with ED staff for pt's safe discharge from ED       Home Medication Regimen: Discuss pt's most current medication list with staff or nurse from [facility] [phone] or the above mentioned [address]  Work Ups:  (include Psych testing or ECT)     EKG on 4/25/22, Comprehensive lab work in April and May of 2022, Valproic Acid level of 72 4 on 5/8/22  Inpatient Recommendations: A non-extended inpt [address] stay should be considered as brief stays have been requested by [facility] staff  Pt should attend groups, pt has a High Risk Therapeutic Plan from [facility]  [facility] staff should be contacted as soon as possible to participate in pt's treatment and discharge planning            Outpatient recommendations: Pt is to continue with treatment at [facility] group home staff, care coordinators, and therapist should develop a crisis plan with pt and healthy coping skills in response to her distressful feelings        Situation/Relevant Background Info:    Pt is a 47year old female with cognitive impairment who resides in a group home setting at [facility]  Additionally, pt has care coordinators and receives therapy and psychiatric care through the [facility]  Pt has a history of Bipolar 1 Disorder as well as Borderline Personality Disorder  Pt appears to derive secondary gains from ED visits and experiences as well inpatient behavioral health [facility]) admissions; therefore, group home staff has advised that pt should have brief behavioral health stays/hospitalizations  Pt states she has a boyfriend who is a support system for her; staff confirms that pt has a boyfriend who also lives at [facility] and she will often mirror his issues - pt attempts to be hospitalized when her boyfriend is being hospitalized     Pt often states she does not want to be in the group home upon [address] inpt admission and does not want to be discharged back to group home  Pt has a possibility of another living situation as per TIP coordinator but pt has to be stable in the community as per CM notes from [address] stay on 7/28/22           Diagnoses/Significant Problems (Medical & Psychiatric):   Psychiatric:  Bipolar 1 Disorder, MDD, PTSD, Borderline Personality Disorder     Medical: Hyponatremia ([facility] [address] ), Seizures ([facility] [address] ), Obesity, Hyperlipidemia, HTN, Cognitive Impairment                  Drivers of repeated utilization:      Appears to obtain secondary gains from ED visits and inpatient [address] [place] [address] stays, impulsivity, limited coping skills, attention seeking behaviors, wanting to be inpt if boyfriend is hospitalized                                           Existing Community Resources & Supports:                 Boyfriend Izaiah at [facility] which pt considers supportive  Little to no family involvement     Patient Medical & Psychiatric Care Team:  [address] [place] [address] [phone]  [address] (group home) [phone], Care Coordinator [facility] [phone]    Estuardo, Care Supervisor [facility] [phone]  Bambi Cerna, Therapist, [facility] [phone]  Dr Jeanna Zapata, Psychiatrist, [address] [place] [address] plan date: 05/12/22                   Author:  Huma Delarosa RN                 Date reviewed with patient:         Electronically signed by Huma Delarosa RN at 5/13/2022  9:17 AM   Electronically signed by Huma Delarosa RN at 5/13/2022  9:22 AM   Electronically signed by Huma Delarosa RN at 6/22/2022  9:09 AM   Electronically signed by Huma Delarosa RN at 8/17/2022  3:33 PM

## 2022-12-06 PROBLEM — N30.00 ACUTE CYSTITIS WITHOUT HEMATURIA: Status: RESOLVED | Noted: 2022-10-07 | Resolved: 2022-12-06

## 2022-12-09 ENCOUNTER — HOSPITAL ENCOUNTER (EMERGENCY)
Facility: HOSPITAL | Age: 52
End: 2022-12-12
Attending: EMERGENCY MEDICINE

## 2022-12-09 DIAGNOSIS — F32.A DEPRESSION: Primary | ICD-10-CM

## 2022-12-09 DIAGNOSIS — R56.9 SEIZURES (HCC): ICD-10-CM

## 2022-12-09 DIAGNOSIS — Z00.8 MEDICAL CLEARANCE FOR PSYCHIATRIC ADMISSION: ICD-10-CM

## 2022-12-09 DIAGNOSIS — I10 PRIMARY HYPERTENSION: ICD-10-CM

## 2022-12-09 DIAGNOSIS — Z72.89 SELF MUTILATING BEHAVIOR: ICD-10-CM

## 2022-12-09 DIAGNOSIS — E78.5 HYPERLIPIDEMIA: ICD-10-CM

## 2022-12-09 LAB
AMPHETAMINES SERPL QL SCN: NEGATIVE
BARBITURATES UR QL: NEGATIVE
BENZODIAZ UR QL: NEGATIVE
COCAINE UR QL: NEGATIVE
EXT PREGNANCY TEST URINE: NEGATIVE
EXT. CONTROL: NORMAL
FLUAV RNA RESP QL NAA+PROBE: NEGATIVE
FLUBV RNA RESP QL NAA+PROBE: NEGATIVE
METHADONE UR QL: NEGATIVE
OPIATES UR QL SCN: NEGATIVE
OXYCODONE+OXYMORPHONE UR QL SCN: NEGATIVE
PCP UR QL: NEGATIVE
RSV RNA RESP QL NAA+PROBE: NEGATIVE
SARS-COV-2 RNA RESP QL NAA+PROBE: NEGATIVE
THC UR QL: NEGATIVE

## 2022-12-10 RX ORDER — ZIPRASIDONE HYDROCHLORIDE 20 MG/1
40 CAPSULE ORAL 2 TIMES DAILY WITH MEALS
Status: DISCONTINUED | OUTPATIENT
Start: 2022-12-11 | End: 2022-12-12 | Stop reason: HOSPADM

## 2022-12-10 RX ORDER — FOLIC ACID 1 MG/1
1 TABLET ORAL DAILY
Status: ON HOLD | COMMUNITY
End: 2022-12-19 | Stop reason: SDUPTHER

## 2022-12-10 RX ORDER — IBUPROFEN 600 MG/1
600 TABLET ORAL EVERY 6 HOURS PRN
Status: DISCONTINUED | OUTPATIENT
Start: 2022-12-10 | End: 2022-12-12 | Stop reason: HOSPADM

## 2022-12-10 RX ORDER — ATORVASTATIN CALCIUM 40 MG/1
40 TABLET, FILM COATED ORAL
Status: DISCONTINUED | OUTPATIENT
Start: 2022-12-11 | End: 2022-12-12 | Stop reason: HOSPADM

## 2022-12-10 RX ORDER — PRAZOSIN HYDROCHLORIDE 1 MG/1
2 CAPSULE ORAL DAILY
Status: DISCONTINUED | OUTPATIENT
Start: 2022-12-10 | End: 2022-12-12 | Stop reason: HOSPADM

## 2022-12-10 RX ORDER — QUETIAPINE FUMARATE 25 MG/1
25 TABLET, FILM COATED ORAL
COMMUNITY
End: 2022-12-20

## 2022-12-10 RX ORDER — QUETIAPINE FUMARATE 25 MG/1
25 TABLET, FILM COATED ORAL
Status: DISCONTINUED | OUTPATIENT
Start: 2022-12-10 | End: 2022-12-12 | Stop reason: HOSPADM

## 2022-12-10 RX ORDER — HYDROXYZINE HYDROCHLORIDE 25 MG/1
100 TABLET, FILM COATED ORAL EVERY 6 HOURS PRN
Status: DISCONTINUED | OUTPATIENT
Start: 2022-12-10 | End: 2022-12-12 | Stop reason: HOSPADM

## 2022-12-10 RX ORDER — MIRTAZAPINE 15 MG/1
45 TABLET, FILM COATED ORAL
Status: DISCONTINUED | OUTPATIENT
Start: 2022-12-10 | End: 2022-12-12 | Stop reason: HOSPADM

## 2022-12-10 RX ORDER — LEVOCARNITINE 330 MG/1
330 TABLET ORAL DAILY
COMMUNITY
End: 2022-12-20

## 2022-12-10 RX ORDER — SENNOSIDES 8.6 MG
1 TABLET ORAL 2 TIMES DAILY
Status: DISCONTINUED | OUTPATIENT
Start: 2022-12-11 | End: 2022-12-12 | Stop reason: HOSPADM

## 2022-12-10 RX ORDER — CYANOCOBALAMIN (VITAMIN B-12) 500 MCG
1000 TABLET ORAL DAILY
Status: ON HOLD | COMMUNITY
End: 2022-12-19 | Stop reason: SDUPTHER

## 2022-12-10 RX ORDER — HALOPERIDOL 10 MG/1
5 TABLET ORAL 2 TIMES DAILY
Status: DISCONTINUED | OUTPATIENT
Start: 2022-12-11 | End: 2022-12-12 | Stop reason: HOSPADM

## 2022-12-10 RX ORDER — BENZTROPINE MESYLATE 0.5 MG/1
2 TABLET ORAL 2 TIMES DAILY PRN
Status: DISCONTINUED | OUTPATIENT
Start: 2022-12-10 | End: 2022-12-12 | Stop reason: HOSPADM

## 2022-12-10 RX ORDER — FOLIC ACID 1 MG/1
1 TABLET ORAL DAILY
Status: DISCONTINUED | OUTPATIENT
Start: 2022-12-11 | End: 2022-12-12 | Stop reason: HOSPADM

## 2022-12-10 RX ORDER — TRAZODONE HYDROCHLORIDE 50 MG/1
50 TABLET ORAL
COMMUNITY
End: 2022-12-20

## 2022-12-10 RX ORDER — OMEGA-3S/DHA/EPA/FISH OIL/D3 300MG-1000
400 CAPSULE ORAL DAILY
Status: DISCONTINUED | OUTPATIENT
Start: 2022-12-11 | End: 2022-12-12 | Stop reason: HOSPADM

## 2022-12-10 RX ORDER — TRAZODONE HYDROCHLORIDE 50 MG/1
50 TABLET ORAL
Status: DISCONTINUED | OUTPATIENT
Start: 2022-12-10 | End: 2022-12-12 | Stop reason: HOSPADM

## 2022-12-10 RX ORDER — ASPIRIN 81 MG/1
81 TABLET, CHEWABLE ORAL DAILY
Status: DISCONTINUED | OUTPATIENT
Start: 2022-12-11 | End: 2022-12-12 | Stop reason: HOSPADM

## 2022-12-10 RX ORDER — LISINOPRIL 20 MG/1
20 TABLET ORAL DAILY
Status: DISCONTINUED | OUTPATIENT
Start: 2022-12-11 | End: 2022-12-12 | Stop reason: HOSPADM

## 2022-12-10 RX ORDER — FLUOXETINE HYDROCHLORIDE 20 MG/1
40 CAPSULE ORAL DAILY
Status: DISCONTINUED | OUTPATIENT
Start: 2022-12-11 | End: 2022-12-12 | Stop reason: HOSPADM

## 2022-12-10 RX ORDER — DIVALPROEX SODIUM 500 MG/1
2000 TABLET, EXTENDED RELEASE ORAL
Status: DISCONTINUED | OUTPATIENT
Start: 2022-12-10 | End: 2022-12-12 | Stop reason: HOSPADM

## 2022-12-10 RX ADMIN — TRAZODONE HYDROCHLORIDE 50 MG: 50 TABLET ORAL at 23:36

## 2022-12-10 RX ADMIN — LURASIDONE HYDROCHLORIDE 80 MG: 80 TABLET, FILM COATED ORAL at 23:39

## 2022-12-10 RX ADMIN — MIRTAZAPINE 45 MG: 15 TABLET, FILM COATED ORAL at 23:36

## 2022-12-10 RX ADMIN — QUETIAPINE FUMARATE 25 MG: 25 TABLET ORAL at 23:36

## 2022-12-10 RX ADMIN — DIVALPROEX SODIUM 2000 MG: 500 TABLET, EXTENDED RELEASE ORAL at 23:39

## 2022-12-10 NOTE — ED NOTES
[facility]    ID# [id number]    [address] 12/10/2022 12/10/2022  [facility] 12/10/2022 12/10/2022    Other or Additional Payor MEDICARE PART B 12/10/2022 12/10/2022  Other or Additional Payor MEDICARE PART A 12/10/2022 12/10/2022

## 2022-12-10 NOTE — ED NOTES
This worker attempted to access [employer]  Called [phone], "call cannot be completed as dialed, please check number and call again " Contacted alternative number, [phone], message stated that there was no one available at this time as office hours are 9-5 during weekday hours  Attempted to follow prompts to reach someone but numbers did not connect to anyone and redirected back to main automated menu

## 2022-12-10 NOTE — ED NOTES
Called Marii - was told he is going to "page the doctor to give us a call back to see what is going on with this patient"        Artie Vasquez RN  12/10/22 [phone]

## 2022-12-10 NOTE — ED NOTES
Called Sanam to update about patient status - left message  Also left message at [facility] main desk and left message        Audrey Brunner RN  12/10/22 7782

## 2022-12-10 NOTE — ED PROVIDER NOTES
History  Chief Complaint   Patient presents with   • Psychiatric Evaluation     SI due to relationship problems, plan to cut her throat, denies HI     52-year-old female with extensive psychiatric history, frequent ureteral health utilizer, plan reviewed presents for evaluation of her typical behavior, reports "relationship trouble" with her boyfriend and states that she was thinking of hurting herself with attack where she superficially scratched herself in the left forearm  Presentation is similar to previous visits no new features  No medications taken prior to arrival   Current medical complaints  Behavioral health utilizer plan reviewed we will have crisis contact  Jennifer Jarquin contacts  Patient is currently lives in [address] [place] [address] with close supervision and multiple outpatient crisis management resources          Prior to Admission Medications   Prescriptions Last Dose Informant Patient Reported? Taking?    Cyanocobalamin (Vitamin B 12) 500 MCG TABS   Yes Yes   Sig: Take 1,000 mcg by mouth in the morning   FLUoxetine (PROzac) 20 mg capsule   No No   Sig: Take 1 capsule (20 mg total) by mouth daily   Patient taking differently: Take 40 mg by mouth daily   Latuda 80 MG tablet   Yes No   QUEtiapine (SEROquel) 25 mg tablet   Yes Yes   Sig: Take 25 mg by mouth daily at bedtime   aspirin 81 mg chewable tablet 12/9/2022  No Yes   Sig: Chew 1 tablet (81 mg total) daily   atorvastatin (LIPITOR) 40 mg tablet 12/9/2022  No Yes   Sig: Take 1 tablet (40 mg total) by mouth daily with dinner   cholecalciferol (VITAMIN D3) 400 units tablet 12/9/2022  No Yes   Sig: Take 1 tablet (400 Units total) by mouth daily   divalproex sodium (DEPAKOTE ER) 500 mg 24 hr tablet   No No   Sig: Take 5 tablets (2,500 mg total) by mouth daily at bedtime   Patient taking differently: Take 2,000 mg by mouth daily at bedtime   folic acid (FOLVITE) 1 mg tablet   Yes Yes   Sig: Take 1 mg by mouth daily   haloperidol (HALDOL) 10 mg tablet   No No Sig: Take 1 tablet (10 mg total) by mouth 2 (two) times a day Take 1 tablet in the morning and 1 tablet before bedtime   Patient taking differently: Take 5 mg by mouth 2 (two) times a day Take 1 tablet in the morning and 1 tablet before bedtime   hydrOXYzine HCL (ATARAX) 50 mg tablet   Yes No   Si mg every 6 (six) hours as needed   ibuprofen (MOTRIN) 600 mg tablet   Yes No   levOCARNitine (CARNITOR) 330 MG tablet   Yes Yes   Sig: Take 330 mg by mouth in the morning   lisinopril (ZESTRIL) 20 mg tablet   No No   Sig: Take 1 tablet (20 mg total) by mouth daily   mirtazapine (REMERON) 45 MG tablet   No No   Sig: Take 1 tablet (45 mg total) by mouth daily at bedtime   prazosin (MINIPRESS) 2 mg capsule   No No   Sig: Take 1 capsule (2 mg total) by mouth daily at bedtime   senna-docusate sodium (SENOKOT S) 8 6-50 mg per tablet   No No   Sig: Take 1 tablet by mouth 2 (two) times a day   traZODone (DESYREL) 50 mg tablet   Yes Yes   Sig: Take 50 mg by mouth daily at bedtime   ziprasidone (GEODON) 40 mg capsule   Yes No   Si mg 2 (two) times a day with meals      Facility-Administered Medications: None       Past Medical History:   Diagnosis Date   • Anxiety    • Bipolar 1 disorder (HCC)    • Bipolar affective disorder, depressed, severe ([facility] [address] ) 10/10/2021   • Borderline personality disorder (HCC)    • CAD (coronary artery disease)    • Cognitive impairment    • Depression    • Dyslipidemia    • GERD (gastroesophageal reflux disease)    • Hypertension    • Obesity    • Psychiatric disorder    • Psychiatric illness    • PTSD (post-traumatic stress disorder)    • Schizoaffective disorder (HCC)    • Seizure (HCC)        Past Surgical History:   Procedure Laterality Date   • APPENDECTOMY      PATIENT DENIES HAVING APPENDIX REMOVED   • CHOLECYSTECTOMY     • FOOT SURGERY Right        Family History   Problem Relation Age of Onset   • Kidney cancer Mother    • Cerebral aneurysm Father      I have reviewed and agree with the history as documented  E-Cigarette/Vaping   • E-Cigarette Use Never User      E-Cigarette/Vaping Substances   • Nicotine No    • THC No    • CBD No    • Flavoring No    • Other No    • Unknown No      Social History     Tobacco Use   • Smoking status: Former   • Smokeless tobacco: Never   • Tobacco comments:     Pt stated "smokes when stressed"   Vaping Use   • Vaping Use: Never used   Substance Use Topics   • Alcohol use: Yes     Comment: occasionally   • Drug use: Not Currently       Review of Systems   Constitutional: Negative for appetite change and fever  HENT: Negative for rhinorrhea and sore throat  Eyes: Negative for photophobia and visual disturbance  Respiratory: Negative for cough, chest tightness and wheezing  Cardiovascular: Negative for chest pain, palpitations and leg swelling  Gastrointestinal: Negative for abdominal distention, abdominal pain, blood in stool, constipation and diarrhea  Genitourinary: Negative for dysuria, flank pain, frequency, hematuria and urgency  Musculoskeletal: Negative for back pain  Skin: Negative for rash  Neurological: Negative for dizziness, weakness and headaches  Psychiatric/Behavioral: Positive for self-injury and suicidal ideas  All other systems reviewed and are negative  Physical Exam  Physical Exam  Vitals and nursing note reviewed  Constitutional:       Appearance: She is well-developed  HENT:      Head: Normocephalic and atraumatic  Eyes:      Pupils: Pupils are equal, round, and reactive to light  Cardiovascular:      Rate and Rhythm: Normal rate and regular rhythm  Heart sounds: No murmur heard  No friction rub  No gallop  Pulmonary:      Effort: Pulmonary effort is normal       Breath sounds: No wheezing or rales  Chest:      Chest wall: No tenderness  Abdominal:      General: There is no distension  Palpations: Abdomen is soft  There is no mass  Tenderness: There is no guarding or rebound  Musculoskeletal:      Cervical back: Normal range of motion and neck supple  Comments: Superficial abrasion to left forearm, appears somewhat old   Skin:     General: Skin is warm and dry  Neurological:      Mental Status: She is alert and oriented to person, place, and time  Vital Signs  ED Triage Vitals   Temperature Pulse Respirations Blood Pressure SpO2   12/09/22 2245 12/09/22 2245 12/09/22 2245 12/09/22 2245 12/09/22 2245   97 6 °F (36 4 °C) 98 20 128/72 95 %      Temp Source Heart Rate Source Patient Position - Orthostatic VS BP Location FiO2 (%)   12/09/22 2238 12/09/22 2238 12/09/22 2245 12/09/22 2245 --   Temporal Monitor Sitting Left arm       Pain Score       12/09/22 2238       No Pain           Vitals:    12/09/22 2245 12/10/22 0703   BP: 128/72 115/60   Pulse: 98 78   Patient Position - Orthostatic VS: Sitting Sitting         Visual Acuity      ED Medications  Medications   prazosin (MINIPRESS) capsule 2 mg (2 mg Oral Not Given 12/10/22 0915)   mirtazapine (REMERON) tablet 45 mg (45 mg Oral Not Given 12/10/22 0200)       Diagnostic Studies  Results Reviewed     Procedure Component Value Units Date/Time    FLU/RSV/COVID - if FLU/RSV clinically relevant [[id number]]  (Normal) Collected: 12/09/22 2300    Lab Status: Final result Specimen: Nares from Nose Updated: 12/09/22 2345     SARS-CoV-2 Negative     INFLUENZA A PCR Negative     INFLUENZA B PCR Negative     RSV PCR Negative    Narrative:      FOR PEDIATRIC PATIENTS - copy/paste COVID Guidelines URL to browser: [email or web address]    SARS-CoV-2 assay is a Nucleic Acid Amplification assay intended for the  qualitative detection of nucleic acid from SARS-CoV-2 in nasopharyngeal  swabs  Results are for the presumptive identification of SARS-CoV-2 RNA      Positive results are indicative of infection with SARS-CoV-2, the virus  causing COVID-19, but do not rule out bacterial infection or co-infection  with other viruses  Laboratories within the [place] and its  territories are required to report all positive results to the appropriate  public health authorities  Negative results do not preclude SARS-CoV-2  infection and should not be used as the sole basis for treatment or other  patient management decisions  Negative results must be combined with  clinical observations, patient history, and epidemiological information  This test has not been FDA cleared or approved  This test has been authorized by FDA under an Emergency Use Authorization  (EUA)  This test is only authorized for the duration of time the  declaration that circumstances exist justifying the authorization of the  emergency use of an in vitro diagnostic tests for detection of SARS-CoV-2  virus and/or diagnosis of COVID-19 infection under section 564(b)(1) of  the Act, 21 U  S C  993BEC-3(N)(8), unless the authorization is terminated  or revoked sooner  The test has been validated but independent review by FDA  and CLIA is pending  Test performed using [employer] Gene[employer]pert: This RT-PCR assay targets N2,  a region unique to SARS-CoV-2  A conserved region in the E-gene was chosen  for pan-Sarbecovirus detection which includes SARS-CoV-2  According to CMS-2020-01-R, this platform meets the definition of high-throughput technology  Rapid drug screen, urine [[id number]]  (Normal) Collected: 12/09/22 2300    Lab Status: Final result Specimen: Urine, Other Updated: 12/09/22 2327     Amph/Meth UR Negative     Barbiturate Ur Negative     Benzodiazepine Urine Negative     Cocaine Urine Negative     Methadone Urine Negative     Opiate Urine Negative     PCP Ur Negative     THC Urine Negative     Oxycodone Urine Negative    Narrative:      FOR MEDICAL PURPOSES ONLY  IF CONFIRMATION NEEDED PLEASE CONTACT THE LAB WITHIN 5 DAYS      Drug Screen Cutoff Levels:  AMPHETAMINE/METHAMPHETAMINES  1000 ng/mL  BARBITURATES     200 ng/mL  BENZODIAZEPINES     200 ng/mL  COCAINE      300 ng/mL  METHADONE      300 ng/mL  OPIATES      300 ng/mL  PHENCYCLIDINE     25 ng/mL  THC       50 ng/mL  OXYCODONE      100 ng/mL    POCT pregnancy, urine [[id number]]  (Normal) Resulted: 12/09/22 2259    Lab Status: Final result Updated: 12/09/22 2259     EXT Preg Test, Ur Negative     Control Valid                 No orders to display              Procedures  Procedures         ED Course  ED Course as of 12/10/22 2241   Sat Dec 10, 2022   0126 This is reached out to facility feel patient would be safe for discharge however tonight they are short staffed and may not be able to offer the level of observation the patient requires will hold the patient in the emergency department overnight and have psychiatry evaluate patient                               SBIRT 20yo+    Flowsheet Row Most Recent Value   SBIRT (23 yo +)    In order to provide better care to our patients, we are screening all of our patients for alcohol and drug use  Would it be okay to ask you these screening questions? Yes Filed at: 12/09/2022 2245   Initial Alcohol Screen: US AUDIT-C     1  How often do you have a drink containing alcohol? 0 Filed at: 12/09/2022 2245   2  How many drinks containing alcohol do you have on a typical day you are drinking? 0 Filed at: 12/09/2022 2245   3b  FEMALE Any Age, or MALE 65+: How often do you have 4 or more drinks on one occassion? 0 Filed at: 12/09/2022 2245   Audit-C Score 0 Filed at: 12/09/2022 2245   DAT: How many times in the past year have you    Used an illegal drug or used a prescription medication for non-medical reasons?  Never Filed at: 12/09/2022 2245                    MDM  Number of Diagnoses or Management Options  Depression: established and worsening  Self mutilating behavior: established and worsening  Diagnosis management comments: 71-year-old with high utilizer behavioral health patient from [address] [place] [address] presents for evaluation complaining of feeling "bad" because of relationship issues with her boyfriend big plan to hurt herself with a thumbtack that she gets from the office with old appearing superficial abrasions to left forearm  Other external signs of trauma no medical complaints will have crisis follow a high utilizer plan  No current criteria for 302       Amount and/or Complexity of Data Reviewed  Clinical lab tests: ordered and reviewed  Tests in the radiology section of CPT®: ordered and reviewed  Tests in the medicine section of CPT®: ordered and reviewed  Decide to obtain previous medical records or to obtain history from someone other than the patient: yes  Obtain history from someone other than the patient: yes  Review and summarize past medical records: yes  Independent visualization of images, tracings, or specimens: yes        Disposition  Final diagnoses:   Depression   Self mutilating behavior     Time reflects when diagnosis was documented in both MDM as applicable and the Disposition within this note     Time User Action Codes Description Comment    12/9/2022 11:01 PM Kathryn Andrade Add [F32  A] Depression     12/9/2022 11:01 PM Kathryn Andrade Add [Z72 89] Self mutilating behavior       ED Disposition     ED Disposition   Transfer to [address] [place] [address]   --    Date/Time   Sat Dec 10, 2022  7:30 PM    Comment   Leydi Askew should be transferred out to [facility] and has been medically cleared  MD Documentation    Elyse Chawla Most Recent Value   Accepting Physician N/A   Accepting Facility Name, [facility]   Sending MD N/A      RN Documentation    [address], [address]  N/A      Follow-up Information    None         Patient's Medications   Discharge Prescriptions    No medications on file       No discharge procedures on file      PDMP Review       Value Time User    PDMP Reviewed  Yes 5/6/2022 10:38 AM Atilio Navarro MD          ED Provider  Electronically Signed by           Tyler Earl, DO  12/10/22 1306 [facility] Kei, DO  12/10/22 2444

## 2022-12-10 NOTE — ED NOTES
Got a callback from [address] [place] [address] and was told that it was never put in que, and to help us out and save time they will put in a consult over the phone and have the psychiatrist call to evaluate a patient  Approximately 30 minutes        Paul Wan RN  12/10/22 1598

## 2022-12-10 NOTE — ED NOTES
Received phone call from the  patient care coordinator from [facility]) which she requested a update of the Pt  CW related to Juanpablo that the Pt is still awaiting a psych consult which will determined the plan of treatment for the Pt  Juanpablo requested a phone call once the Pt's treatment plan has been determined   Phone number for Juanpablo is [phone] [phone]

## 2022-12-10 NOTE — ED NOTES
Pt presents to the ED via ambulance w/ plan to cut throat  Pt is identified as a [place] [address], Pt last presented to ED on 11/6/22 for self mutilation and had presented to other campuses on 11/5 and 11/4 for similar circumstances  This worker introduced self and role of the assessment, Pt stated that she would be truthful in answering questions as it is "important " When asked in her own words as to why she needed to contact EMS to bring her to the hospital she responded, "I'm very suicidal Alyson Cote to the point that I don't care if I live or die " Pt stated this w/ great emphasis  When asked how long Pt was feeling this way she stated for the last 2-3 days  Further questioning led to stressors being ongoing relationship issues w/ paramour of four weeks w/ an unfounded belief that he is cheating on her  "He tells me he loves me but I don't believe it " Pt also stated that she does not like the staff and when asked why she stated that they are "mean to me" which translated by her own description as being redirected because of admitted behaviors on her part  When asked for a concrete example she was not able to give an example and reported that she had no idea  Pt reported taking all medications which she could not confirm being effective  Pt stated that she has persecutory voices that refer to her as "ugly and fat" but denies any direction to hurt self or others  Pt reports that she has not self injured since last ED visit but it is noted that she superficial wounds that are healing  Pt reports a HX of more hospitalizations then she can count but denied any substance abuse related [place]  Pt reports that she is sleeping too much and that her diet is suffering as well  Judgment/impulse control/concentration/memory continue to be in range of fair to poor  Pt states that she continues to be anxious/depressed   Pt denied any HI but stated that she is SI w/ plan to cut throat and when asked if she has means to complete the act she stated, "well no   but I could find something " This worker explained to Pt that due to presentation this evening a psych consult would be requested due to similar presentation as to those a month ago  Pt appeared confused stating, "So you are not going to let me sign in now? Can you explain to the doctor that I just need to get away for a week   just a week " This worker asked why for "just a week" and she blindly stared off not being able to answer  Pt stated that she would like to be home for the holidays but just needs to "get away " Pt was pleasant and appropriate like prior ED presentations  Pt continues to struggle answering questions and apologizes for such  Pt was not able to be assessed immediately but showed no sign of psychiatric distress at any time  This worker spoke to attending physician and a psych consult will be ordered in the AM  Pt resting comfortably at this time  This worker contacted staff at [facility] and it was confirmed that these are ongoing issues and that she was not encouraged to sign in for [address] as it is not believed it is needed due to the LOC that she resides in  Pt receives all services through [facility]

## 2022-12-10 NOTE — TELEMEDICINE
TeleConsultation - [facility] 47 y o  female MRN: [id number]  Unit/Bed#: 31 Christy Antonio 01 Encounter: [id number]        REQUIRED DOCUMENTATION:     1  This service was provided via Telemedicine  2  Provider located at [place]  3  TeleMed provider: Javier Grant MD   4  Identify all parties in room with patient during tele consult:  Patient  5  Patient was then informed that this was a Telemedicine visit and that the exam was being conducted confidentially over secure lines  My office door was closed  No one else was in the room  Patient acknowledged consent and understanding of privacy and security of the Telemedicine visit, and gave us permission to have the assistant stay in the room in order to assist with the history and to conduct the exam   I informed the patient that I have reviewed their record in Epic and presented the opportunity for them to ask any questions regarding the visit today  The patient agreed to participate  Assessment/Plan     Active Problems:    * No active hospital problems  *    Assessment:    Bipolar 1 disorder by history most recent depressed severe without psychotic features; PTSD by history; unspecified anxiety disorder by history    Treatment Plan:    Inpatient psychiatric treatment is indicated for provision of precautions, further diagnostic evaluation and treatment stabilization given that the patient reports she is experiencing suicidal ideation with plan to slash her throat with command auditory hallucinations telling her to do so  The patient agrees with this plan and is appropriate to sign 201 voluntary paperwork otherwise 302 would be indicated  Would confirm and then continue her current home psychiatric medication regimen  May consider using Zyprexa 5 mg orally disintegrating tablet every 6 hours as needed agitation/psychosis  Continue observation suicide precautions are indicated  Reconsult psychiatry as needed      Current Medications:     Current Facility-Administered Medications   Medication Dose Route Frequency Provider Last Rate   • mirtazapine  45 mg Oral HS Juliane Martinez DO     • prazosin  2 mg Oral Daily Juliane Martinez DO         Risks / Benefits of Treatment:    Risks, benefits, and possible side effects of medications explained to patient and patient verbalizes understanding  Consult to Psychiatry  Consult performed by: Neel Bernal MD  Consult ordered by: Juliane Martinez DO        Physician Requesting Consult: Juliane Martinez DO  Principal Problem:<principal problem not specified>    Reason for Consult: Suicidal ideation with plans of/throat using knife or glass      History of Present Illness      Patient is a 47 y o  female who presented to the emergency department with a provider has documented the following: "  SI due to relationship problems, plan to cut her throat, denies HI      44-year-old female with extensive psychiatric history, frequent ureteral health utilizer, plan reviewed presents for evaluation of her typical behavior, reports "relationship trouble" with her boyfriend and states that she was thinking of hurting herself with attack where she superficially scratched herself in the left forearm  Presentation is similar to previous visits no new features  No medications taken prior to arrival   Current medical complaints  Behavioral health utilizer plan reviewed we will have crisis contact  Jennifer Jarquin contacts  Patient is currently lives in [address] [place] [address] with close supervision and multiple outpatient crisis management resources              Prior to Admission Medications   Prescriptions Last Dose Informant Patient Reported? Taking?    FLUoxetine (PROzac) 20 mg capsule     No No   Sig: Take 1 capsule (20 mg total) by mouth daily   FLUoxetine (PROzac) 40 MG capsule     Yes No   Latuda 80 MG tablet     Yes No   aspirin 81 mg chewable tablet     No No   Sig: Chew 1 tablet (81 mg total) daily   atorvastatin (LIPITOR) 40 mg tablet     No No Sig: Take 1 tablet (40 mg total) by mouth daily with dinner   benztropine (COGENTIN) 2 mg tablet     Yes No   cholecalciferol (VITAMIN D3) 400 units tablet     No No   Sig: Take 1 tablet (400 Units total) by mouth daily   divalproex sodium (DEPAKOTE ER) 500 mg 24 hr tablet     No No   Sig: Take 5 tablets (2,500 mg total) by mouth daily at bedtime   Patient taking differently: Take 2,000 mg by mouth daily at bedtime   divalproex sodium (DEPAKOTE) 500 mg EC tablet     Yes No   Patient not taking: Reported on 10/7/2022   haloperidol (HALDOL) 10 mg tablet     No No   Sig: Take 1 tablet (10 mg total) by mouth 2 (two) times a day Take 1 tablet in the morning and 1 tablet before bedtime   haloperidol (HALDOL) 5 mg tablet     Yes No   hydrOXYzine HCL (ATARAX) 50 mg tablet     Yes No   ibuprofen (MOTRIN) 600 mg tablet     Yes No   lisinopril (ZESTRIL) 20 mg tablet     No No   Sig: Take 1 tablet (20 mg total) by mouth daily   mirtazapine (REMERON) 45 MG tablet     No No   Sig: Take 1 tablet (45 mg total) by mouth daily at bedtime   prazosin (MINIPRESS) 2 mg capsule     No No   Sig: Take 1 capsule (2 mg total) by mouth daily at bedtime   senna-docusate sodium (SENOKOT S) 8 6-50 mg per tablet     No No   Sig: Take 1 tablet by mouth 2 (two) times a day   ziprasidone (GEODON) 40 mg capsule     Yes No      Facility-Administered Medications: None         Medical History[]Expand by Default        Past Medical History:   Diagnosis Date   • Anxiety     • Bipolar 1 disorder (HCC)     • Bipolar affective disorder, depressed, severe ([facility] ) 10/10/2021   • Borderline personality disorder (HCC)     • CAD (coronary artery disease)     • Cognitive impairment     • Depression     • Dyslipidemia     • GERD (gastroesophageal reflux disease)     • Hypertension     • Obesity     • Psychiatric disorder     • Psychiatric illness     • PTSD (post-traumatic stress disorder)     • Schizoaffective disorder (HCC)     • Seizure ([facility] )         Surgical History[]Expand by Default         Past Surgical History:   Procedure Laterality Date   • APPENDECTOMY         PATIENT DENIES HAVING APPENDIX REMOVED   • CHOLECYSTECTOMY       • FOOT SURGERY Right              Family History[]Expand by Default         Family History   Problem Relation Age of Onset   • Kidney cancer Mother     • Cerebral aneurysm Father           I have reviewed and agree with the history as documented            E-Cigarette/Vaping   • E-Cigarette Use Never User              E-Cigarette/Vaping Substances   • Nicotine No     • THC No     • CBD No     • Flavoring No     • Other No     • Unknown No        Social History            Tobacco Use   • Smoking status: Former   • Smokeless tobacco: Never   • Tobacco comments:       Pt stated "smokes when stressed"   Vaping Use   • Vaping Use: Never used   Substance Use Topics   • Alcohol use: Yes       Comment: occasionally   • Drug use: Not Currently         Review of Systems   Constitutional: Negative for appetite change and fever  HENT: Negative for rhinorrhea and sore throat  Eyes: Negative for photophobia and visual disturbance  Respiratory: Negative for cough, chest tightness and wheezing  Cardiovascular: Negative for chest pain, palpitations and leg swelling  Gastrointestinal: Negative for abdominal distention, abdominal pain, blood in stool, constipation and diarrhea  Genitourinary: Negative for dysuria, flank pain, frequency, hematuria and urgency  Musculoskeletal: Negative for back pain  Skin: Negative for rash  Neurological: Negative for dizziness, weakness and headaches  Psychiatric/Behavioral: Positive for self-injury and suicidal ideas  All other systems reviewed and are negative        The patient reports to me that she has been suicidal with a plan to cut her throat using class or razor for the last 3 to 4 days and has been experiencing auditory hallucinations commanding her to kill her self over the last week   She states she has been feeling extremely depressed  Past psychiatric history: The patient currently sees outpatient psychiatry for bipolar 1 disorder and PTSD per patient  Social history: The patient is single with no children is living in the group home setting  She reports no abuse  Family history: The patient reports she is adopted and knows nothing of her biologic family    Substance use history: The patient denies use of alcohol and other substances  Mental status examination: The patient is alert and well oriented in all spheres  Affect is depressed and constricted  Speech is unremarkable  Sensorium is clear  Thought processes logical and linear  Thought content is reality based  Associations are tight  Memory is intact in all spheres  She appears to be in the low average intellectual range of functioning by use of vocabulary, general fund of knowledge, semistructured and syntax  Motivated suicidal ideation with plan as above she denies homicidal ideation  She admits to auditory hallucinations commanding her to kill herself over the last week  Insight and judgment are impaired at this time  She does recognize the need for inpatient psychiatric treatment      Past Medical History:   Diagnosis Date   • Anxiety    • Bipolar 1 disorder ([place] [address] )    • Bipolar affective disorder, depressed, severe ([place] [address] ) 10/10/2021   • Borderline personality disorder ([place] [address] )    • CAD (coronary artery disease)    • Cognitive impairment    • Depression    • Dyslipidemia    • GERD (gastroesophageal reflux disease)    • Hypertension    • Obesity    • Psychiatric disorder    • Psychiatric illness    • PTSD (post-traumatic stress disorder)    • Schizoaffective disorder ([place] [address] )    • Seizure ([place] [address] )        Medical Review Of Systems:    Review of Systems    Meds/Allergies     all current active meds have been reviewed  Allergies   Allergen Reactions   • Fish Oil - Food Allergy Other (See Comments)     unknown   • Fish-Derived Products - Food Allergy Hives       Objective     Vital signs in last 24 hours:  Temp:  [97 6 °F (36 4 °C)] 97 6 °F (36 4 °C)  HR:  [78-98] 78  Resp:  [18-20] 18  BP: (115-128)/(60-72) 115/60    No intake or output data in the 24 hours ending 12/10/22 1840        Lab Results: I have personally reviewed all pertinent laboratory/tests results  Imaging Studies: No results found  EKG/Pathology/Other Studies:   Lab Results   Component Value Date    VENTRATE 102 07/20/2022    ATRIALRATE 102 07/20/2022    PRINT 180 07/20/2022    QRSDINT 84 07/20/2022    QTINT 334 07/20/2022    QTCINT 435 07/20/2022    PAXIS 34 07/20/2022    QRSAXIS 15 07/20/2022    TWAVEAXIS 62 07/20/2022        Code Status: Prior  Advance Directive and Living Will:      Power of :    POLST:      Counseling / Coordination of Care: Total floor / unit time spent today 30 minutes  Greater than 50% of total time was spent with the patient and / or family counseling and / or coordination of care  A description of the counseling / coordination of care: Chart review, patient evaluation, coordination and communication with staff, nursing and provider

## 2022-12-10 NOTE — ED NOTES
This worker reached [facility] at [facility], [phone]  Gurpreet Biggs stated that staff are aware that she is here but stated that they are not quite sure as to why  This worker explained that the presenting issues are congruent w/ prior complaints (relationship issues, needing a "break" for two weeks and going somewhere and then going home for the holidays to be w/ said paramour who is the reason she is stating that she needs a break)  Gurpreet Biggs stated that this is in line w/ what the staff are hearing out there  This worker asked Gurpreet Kalyan as to whether she believes that there is any need for a hospitalization and stated that she does not believe so at this time  This worker asked about staff availability to collect Pt from ED and Gurpreet Biggs stated that due to current staffing ratios that transportation would be a day shift scheduling issue  This worker stated that he would follow up w/ [facility] after consultation w/ attending

## 2022-12-11 RX ADMIN — SENNOSIDES 8.6 MG: 8.6 TABLET, FILM COATED ORAL at 18:41

## 2022-12-11 RX ADMIN — CHOLECALCIFEROL (VITAMIN D3) 10 MCG (400 UNIT) TABLET 400 UNITS: at 10:02

## 2022-12-11 RX ADMIN — HALOPERIDOL 5 MG: 10 TABLET ORAL at 10:02

## 2022-12-11 RX ADMIN — DIVALPROEX SODIUM 2000 MG: 500 TABLET, EXTENDED RELEASE ORAL at 22:00

## 2022-12-11 RX ADMIN — IBUPROFEN 600 MG: 600 TABLET, FILM COATED ORAL at 13:40

## 2022-12-11 RX ADMIN — PRAZOSIN HYDROCHLORIDE 2 MG: 1 CAPSULE ORAL at 10:03

## 2022-12-11 RX ADMIN — QUETIAPINE FUMARATE 25 MG: 25 TABLET ORAL at 22:01

## 2022-12-11 RX ADMIN — LISINOPRIL 20 MG: 20 TABLET ORAL at 10:04

## 2022-12-11 RX ADMIN — MIRTAZAPINE 45 MG: 15 TABLET, FILM COATED ORAL at 22:01

## 2022-12-11 RX ADMIN — TRAZODONE HYDROCHLORIDE 50 MG: 50 TABLET ORAL at 22:00

## 2022-12-11 RX ADMIN — FOLIC ACID 1 MG: 1 TABLET ORAL at 10:04

## 2022-12-11 RX ADMIN — FLUOXETINE 40 MG: 20 CAPSULE ORAL at 10:02

## 2022-12-11 RX ADMIN — ASPIRIN 81 MG CHEWABLE TABLET 81 MG: 81 TABLET CHEWABLE at 10:04

## 2022-12-11 RX ADMIN — ATORVASTATIN CALCIUM 40 MG: 40 TABLET, FILM COATED ORAL at 18:42

## 2022-12-11 RX ADMIN — SENNOSIDES 8.6 MG: 8.6 TABLET, FILM COATED ORAL at 10:04

## 2022-12-11 RX ADMIN — LURASIDONE HYDROCHLORIDE 80 MG: 80 TABLET, FILM COATED ORAL at 22:01

## 2022-12-11 RX ADMIN — HALOPERIDOL 5 MG: 10 TABLET ORAL at 18:41

## 2022-12-11 RX ADMIN — ZIPRASIDONE HYDROCHLORIDE 40 MG: 20 CAPSULE ORAL at 18:42

## 2022-12-11 RX ADMIN — ZIPRASIDONE HYDROCHLORIDE 40 MG: 20 CAPSULE ORAL at 10:06

## 2022-12-11 RX ADMIN — CYANOCOBALAMIN TAB 500 MCG 1000 MCG: 500 TAB at 10:04

## 2022-12-11 NOTE — ED NOTES
CIS received a call from Cathy at [facility] (admissions) who informed me that patient has been denied due to not having a appropriate bed  Bed search will continue      Gulshan Gum Crisis Intervention Specialist II

## 2022-12-11 NOTE — ED CARE HANDOFF
Emergency Department Sign Out Note        Sign out and transfer of care from Dr Ronda Reinoso  See Separate Emergency Department note  The patient, Abraham Delarosa, was evaluated by the previous provider for suicidal ideation  Workup Completed:  Patient medically cleared for inpatient psychiatric admission  Crisis consulted  Psychiatry consult pending  ED Course / Workup Pending (followup): Patient evaluated by psychiatry who recommends inpatient psychiatric admission  Patient signed [address]  Placement pending  ED Course as of 12/10/22 1930   Sat Dec 10, 2022   1848 Patient evaluated by psychiatry who recommends inpatient psychiatric admission  Patient agreed to sign [id number] signed     Procedures  MDM        Disposition  Final diagnoses:   Depression   Self mutilating behavior     Time reflects when diagnosis was documented in both MDM as applicable and the Disposition within this note     Time User Action Codes Description Comment    12/9/2022 11:01 PM Jake Boron Add [F32  A] Depression     12/9/2022 11:01 PM Jake Boron Add [Z72 89] Self mutilating behavior       ED Disposition     ED Disposition   Transfer to [address] [place] [address]   --    Date/Time   Sat Dec 10, 2022  7:30 PM    Comment   --         MD Documentation    Flowsheet Row Most Recent Value   Accepting Physician N/A   Accepting Facility Name, Jordyn Ruiz MD N/A      RN Documentation    [address], [address]  N/A      Follow-up Information    None       Patient's Medications   Discharge Prescriptions    No medications on file     No discharge procedures on file         ED Provider  Electronically Signed by     Clement Mccoy MD  12/10/22 8570

## 2022-12-11 NOTE — ED NOTES
Client signed Dhiraj, crisis worker explained rights and responsibilities      201 and facesheet faxed to intake for review

## 2022-12-11 NOTE — ED NOTES
CIS started bed search, CIS called [facility] with [address]- Possible bedShaggy Lamb, CIS faxed clinical for review                                                           formerly [facility]- Possible bed- Rufina Gillis, CIS faxed clinical for review   Bed search to continue      Halle Guaman Crisis Intervention Specialist II

## 2022-12-12 ENCOUNTER — HOSPITAL ENCOUNTER (INPATIENT)
Facility: HOSPITAL | Age: 52
LOS: 8 days | Discharge: HOME | End: 2022-12-20
Attending: STUDENT IN AN ORGANIZED HEALTH CARE EDUCATION/TRAINING PROGRAM | Admitting: STUDENT IN AN ORGANIZED HEALTH CARE EDUCATION/TRAINING PROGRAM

## 2022-12-12 VITALS
TEMPERATURE: 97.6 F | BODY MASS INDEX: 47.8 KG/M2 | HEIGHT: 64 IN | SYSTOLIC BLOOD PRESSURE: 127 MMHG | WEIGHT: 279.98 LBS | HEART RATE: 89 BPM | DIASTOLIC BLOOD PRESSURE: 77 MMHG | RESPIRATION RATE: 18 BRPM | OXYGEN SATURATION: 98 %

## 2022-12-12 DIAGNOSIS — I10 PRIMARY HYPERTENSION: ICD-10-CM

## 2022-12-12 DIAGNOSIS — I10 ESSENTIAL HYPERTENSION: ICD-10-CM

## 2022-12-12 DIAGNOSIS — K59.00 CONSTIPATION: ICD-10-CM

## 2022-12-12 DIAGNOSIS — F32.3 MAJOR DEPRESSION WITH PSYCHOTIC FEATURES (HCC): Primary | ICD-10-CM

## 2022-12-12 DIAGNOSIS — R56.9 SEIZURES (HCC): ICD-10-CM

## 2022-12-12 DIAGNOSIS — E78.5 HYPERLIPIDEMIA: ICD-10-CM

## 2022-12-12 DIAGNOSIS — E78.5 HYPERLIPIDEMIA, UNSPECIFIED HYPERLIPIDEMIA TYPE: ICD-10-CM

## 2022-12-12 DIAGNOSIS — R05.9 COUGH: ICD-10-CM

## 2022-12-12 DIAGNOSIS — Z00.8 MEDICAL CLEARANCE FOR PSYCHIATRIC ADMISSION: ICD-10-CM

## 2022-12-12 DIAGNOSIS — E55.9 VITAMIN D DEFICIENCY: ICD-10-CM

## 2022-12-12 LAB — SARS-COV-2 RNA RESP QL NAA+PROBE: NEGATIVE

## 2022-12-12 RX ORDER — HALOPERIDOL 2 MG/1
2 TABLET ORAL
Status: CANCELLED | OUTPATIENT
Start: 2022-12-12

## 2022-12-12 RX ORDER — DIVALPROEX SODIUM 500 MG/1
2000 TABLET, EXTENDED RELEASE ORAL
Status: DISCONTINUED | OUTPATIENT
Start: 2022-12-12 | End: 2022-12-20 | Stop reason: HOSPADM

## 2022-12-12 RX ORDER — TRAZODONE HYDROCHLORIDE 50 MG/1
50 TABLET ORAL
Status: CANCELLED | OUTPATIENT
Start: 2022-12-12

## 2022-12-12 RX ORDER — QUETIAPINE FUMARATE 25 MG/1
25 TABLET, FILM COATED ORAL
Status: CANCELLED | OUTPATIENT
Start: 2022-12-12

## 2022-12-12 RX ORDER — BENZTROPINE MESYLATE 1 MG/ML
1 INJECTION INTRAMUSCULAR; INTRAVENOUS
Status: DISCONTINUED | OUTPATIENT
Start: 2022-12-12 | End: 2022-12-20 | Stop reason: HOSPADM

## 2022-12-12 RX ORDER — HYDROXYZINE HYDROCHLORIDE 25 MG/1
25 TABLET, FILM COATED ORAL
Status: CANCELLED | OUTPATIENT
Start: 2022-12-12

## 2022-12-12 RX ORDER — FOLIC ACID 1 MG/1
1 TABLET ORAL DAILY
Status: DISCONTINUED | OUTPATIENT
Start: 2022-12-13 | End: 2022-12-20 | Stop reason: HOSPADM

## 2022-12-12 RX ORDER — HYDROXYZINE 50 MG/1
50 TABLET, FILM COATED ORAL
Status: DISCONTINUED | OUTPATIENT
Start: 2022-12-12 | End: 2022-12-20 | Stop reason: HOSPADM

## 2022-12-12 RX ORDER — SENNOSIDES 8.6 MG
1 TABLET ORAL 2 TIMES DAILY
Status: CANCELLED | OUTPATIENT
Start: 2022-12-12

## 2022-12-12 RX ORDER — AMOXICILLIN 250 MG
1 CAPSULE ORAL DAILY PRN
Status: CANCELLED | OUTPATIENT
Start: 2022-12-12

## 2022-12-12 RX ORDER — MIRTAZAPINE 15 MG/1
45 TABLET, FILM COATED ORAL
Status: CANCELLED | OUTPATIENT
Start: 2022-12-12

## 2022-12-12 RX ORDER — BENZTROPINE MESYLATE 1 MG/1
1 TABLET ORAL
Status: DISCONTINUED | OUTPATIENT
Start: 2022-12-12 | End: 2022-12-20 | Stop reason: HOSPADM

## 2022-12-12 RX ORDER — FLUOXETINE HYDROCHLORIDE 20 MG/1
40 CAPSULE ORAL DAILY
Status: CANCELLED | OUTPATIENT
Start: 2022-12-13

## 2022-12-12 RX ORDER — ATORVASTATIN CALCIUM 40 MG/1
40 TABLET, FILM COATED ORAL
Status: CANCELLED | OUTPATIENT
Start: 2022-12-12

## 2022-12-12 RX ORDER — BENZTROPINE MESYLATE 1 MG/ML
0.5 INJECTION INTRAMUSCULAR; INTRAVENOUS
Status: CANCELLED | OUTPATIENT
Start: 2022-12-12

## 2022-12-12 RX ORDER — ACETAMINOPHEN 325 MG/1
650 TABLET ORAL EVERY 6 HOURS PRN
Status: DISCONTINUED | OUTPATIENT
Start: 2022-12-12 | End: 2022-12-20 | Stop reason: HOSPADM

## 2022-12-12 RX ORDER — FLUOXETINE HYDROCHLORIDE 20 MG/1
40 CAPSULE ORAL DAILY
Status: DISCONTINUED | OUTPATIENT
Start: 2022-12-13 | End: 2022-12-20 | Stop reason: HOSPADM

## 2022-12-12 RX ORDER — DIPHENHYDRAMINE HYDROCHLORIDE 50 MG/ML
50 INJECTION INTRAMUSCULAR; INTRAVENOUS EVERY 6 HOURS PRN
Status: CANCELLED | OUTPATIENT
Start: 2022-12-12

## 2022-12-12 RX ORDER — MAGNESIUM HYDROXIDE/ALUMINUM HYDROXICE/SIMETHICONE 120; 1200; 1200 MG/30ML; MG/30ML; MG/30ML
30 SUSPENSION ORAL EVERY 4 HOURS PRN
Status: DISCONTINUED | OUTPATIENT
Start: 2022-12-12 | End: 2022-12-20 | Stop reason: HOSPADM

## 2022-12-12 RX ORDER — ACETAMINOPHEN 325 MG/1
975 TABLET ORAL EVERY 6 HOURS PRN
Status: DISCONTINUED | OUTPATIENT
Start: 2022-12-12 | End: 2022-12-20 | Stop reason: HOSPADM

## 2022-12-12 RX ORDER — HALOPERIDOL 10 MG/1
5 TABLET ORAL
Status: CANCELLED | OUTPATIENT
Start: 2022-12-12

## 2022-12-12 RX ORDER — LORAZEPAM 2 MG/ML
2 INJECTION INTRAMUSCULAR EVERY 6 HOURS PRN
Status: DISCONTINUED | OUTPATIENT
Start: 2022-12-12 | End: 2022-12-20 | Stop reason: HOSPADM

## 2022-12-12 RX ORDER — FOLIC ACID 1 MG/1
1 TABLET ORAL DAILY
Status: CANCELLED | OUTPATIENT
Start: 2022-12-13

## 2022-12-12 RX ORDER — OMEGA-3S/DHA/EPA/FISH OIL/D3 300MG-1000
400 CAPSULE ORAL DAILY
Status: CANCELLED | OUTPATIENT
Start: 2022-12-13

## 2022-12-12 RX ORDER — PRAZOSIN HYDROCHLORIDE 1 MG/1
2 CAPSULE ORAL DAILY
Status: DISCONTINUED | OUTPATIENT
Start: 2022-12-13 | End: 2022-12-13

## 2022-12-12 RX ORDER — MAGNESIUM HYDROXIDE/ALUMINUM HYDROXICE/SIMETHICONE 120; 1200; 1200 MG/30ML; MG/30ML; MG/30ML
30 SUSPENSION ORAL EVERY 4 HOURS PRN
Status: CANCELLED | OUTPATIENT
Start: 2022-12-12

## 2022-12-12 RX ORDER — HYDROXYZINE HYDROCHLORIDE 25 MG/1
25 TABLET, FILM COATED ORAL
Status: DISCONTINUED | OUTPATIENT
Start: 2022-12-12 | End: 2022-12-20 | Stop reason: HOSPADM

## 2022-12-12 RX ORDER — BENZTROPINE MESYLATE 1 MG/ML
1 INJECTION INTRAMUSCULAR; INTRAVENOUS
Status: CANCELLED | OUTPATIENT
Start: 2022-12-12

## 2022-12-12 RX ORDER — LORAZEPAM 2 MG/ML
1 INJECTION INTRAMUSCULAR
Status: CANCELLED | OUTPATIENT
Start: 2022-12-12

## 2022-12-12 RX ORDER — ASPIRIN 81 MG/1
81 TABLET, CHEWABLE ORAL DAILY
Status: DISCONTINUED | OUTPATIENT
Start: 2022-12-13 | End: 2022-12-20 | Stop reason: HOSPADM

## 2022-12-12 RX ORDER — BENZTROPINE MESYLATE 1 MG/ML
0.5 INJECTION INTRAMUSCULAR; INTRAVENOUS
Status: DISCONTINUED | OUTPATIENT
Start: 2022-12-12 | End: 2022-12-20 | Stop reason: HOSPADM

## 2022-12-12 RX ORDER — LORAZEPAM 2 MG/ML
1 INJECTION INTRAMUSCULAR
Status: DISCONTINUED | OUTPATIENT
Start: 2022-12-12 | End: 2022-12-20 | Stop reason: HOSPADM

## 2022-12-12 RX ORDER — HALOPERIDOL 5 MG/ML
5 INJECTION INTRAMUSCULAR
Status: CANCELLED | OUTPATIENT
Start: 2022-12-12

## 2022-12-12 RX ORDER — ACETAMINOPHEN 325 MG/1
975 TABLET ORAL EVERY 6 HOURS PRN
Status: CANCELLED | OUTPATIENT
Start: 2022-12-12

## 2022-12-12 RX ORDER — LISINOPRIL 20 MG/1
20 TABLET ORAL DAILY
Status: DISCONTINUED | OUTPATIENT
Start: 2022-12-13 | End: 2022-12-20 | Stop reason: HOSPADM

## 2022-12-12 RX ORDER — PRAZOSIN HYDROCHLORIDE 1 MG/1
2 CAPSULE ORAL DAILY
Status: CANCELLED | OUTPATIENT
Start: 2022-12-13

## 2022-12-12 RX ORDER — HYDROXYZINE HYDROCHLORIDE 25 MG/1
50 TABLET, FILM COATED ORAL
Status: CANCELLED | OUTPATIENT
Start: 2022-12-12

## 2022-12-12 RX ORDER — HYDROXYZINE HYDROCHLORIDE 25 MG/1
100 TABLET, FILM COATED ORAL
Status: CANCELLED | OUTPATIENT
Start: 2022-12-12

## 2022-12-12 RX ORDER — BISACODYL 10 MG
10 SUPPOSITORY, RECTAL RECTAL DAILY PRN
Status: DISCONTINUED | OUTPATIENT
Start: 2022-12-12 | End: 2022-12-20 | Stop reason: HOSPADM

## 2022-12-12 RX ORDER — POLYETHYLENE GLYCOL 3350 17 G/17G
17 POWDER, FOR SOLUTION ORAL DAILY PRN
Status: DISCONTINUED | OUTPATIENT
Start: 2022-12-12 | End: 2022-12-20 | Stop reason: HOSPADM

## 2022-12-12 RX ORDER — ATORVASTATIN CALCIUM 40 MG/1
40 TABLET, FILM COATED ORAL
Status: DISCONTINUED | OUTPATIENT
Start: 2022-12-13 | End: 2022-12-20 | Stop reason: HOSPADM

## 2022-12-12 RX ORDER — HALOPERIDOL 5 MG/ML
5 INJECTION INTRAMUSCULAR
Status: DISCONTINUED | OUTPATIENT
Start: 2022-12-12 | End: 2022-12-20 | Stop reason: HOSPADM

## 2022-12-12 RX ORDER — LORAZEPAM 2 MG/ML
2 INJECTION INTRAMUSCULAR
Status: CANCELLED | OUTPATIENT
Start: 2022-12-12

## 2022-12-12 RX ORDER — TRAZODONE HYDROCHLORIDE 50 MG/1
50 TABLET ORAL
Status: DISCONTINUED | OUTPATIENT
Start: 2022-12-12 | End: 2022-12-20 | Stop reason: HOSPADM

## 2022-12-12 RX ORDER — BISACODYL 10 MG
10 SUPPOSITORY, RECTAL RECTAL DAILY PRN
Status: CANCELLED | OUTPATIENT
Start: 2022-12-12

## 2022-12-12 RX ORDER — HALOPERIDOL 2 MG/1
2 TABLET ORAL
Status: DISCONTINUED | OUTPATIENT
Start: 2022-12-12 | End: 2022-12-20 | Stop reason: HOSPADM

## 2022-12-12 RX ORDER — ACETAMINOPHEN 325 MG/1
650 TABLET ORAL EVERY 6 HOURS PRN
Status: CANCELLED | OUTPATIENT
Start: 2022-12-12

## 2022-12-12 RX ORDER — HALOPERIDOL 5 MG/1
5 TABLET ORAL
Status: DISCONTINUED | OUTPATIENT
Start: 2022-12-12 | End: 2022-12-20 | Stop reason: HOSPADM

## 2022-12-12 RX ORDER — BENZTROPINE MESYLATE 0.5 MG/1
1 TABLET ORAL
Status: CANCELLED | OUTPATIENT
Start: 2022-12-12

## 2022-12-12 RX ORDER — TRAZODONE HYDROCHLORIDE 50 MG/1
50 TABLET ORAL
Status: DISCONTINUED | OUTPATIENT
Start: 2022-12-12 | End: 2022-12-16

## 2022-12-12 RX ORDER — LORAZEPAM 2 MG/ML
2 INJECTION INTRAMUSCULAR
Status: DISCONTINUED | OUTPATIENT
Start: 2022-12-12 | End: 2022-12-20 | Stop reason: HOSPADM

## 2022-12-12 RX ORDER — QUETIAPINE FUMARATE 25 MG/1
25 TABLET, FILM COATED ORAL
Status: DISCONTINUED | OUTPATIENT
Start: 2022-12-12 | End: 2022-12-14

## 2022-12-12 RX ORDER — AMOXICILLIN 250 MG
1 CAPSULE ORAL DAILY PRN
Status: DISCONTINUED | OUTPATIENT
Start: 2022-12-12 | End: 2022-12-20 | Stop reason: HOSPADM

## 2022-12-12 RX ORDER — ACETAMINOPHEN 325 MG/1
650 TABLET ORAL EVERY 4 HOURS PRN
Status: DISCONTINUED | OUTPATIENT
Start: 2022-12-12 | End: 2022-12-20 | Stop reason: HOSPADM

## 2022-12-12 RX ORDER — MIRTAZAPINE 15 MG/1
45 TABLET, FILM COATED ORAL
Status: DISCONTINUED | OUTPATIENT
Start: 2022-12-12 | End: 2022-12-20 | Stop reason: HOSPADM

## 2022-12-12 RX ORDER — HYDROXYZINE 50 MG/1
100 TABLET, FILM COATED ORAL
Status: DISCONTINUED | OUTPATIENT
Start: 2022-12-12 | End: 2022-12-20 | Stop reason: HOSPADM

## 2022-12-12 RX ORDER — ACETAMINOPHEN 325 MG/1
650 TABLET ORAL EVERY 4 HOURS PRN
Status: CANCELLED | OUTPATIENT
Start: 2022-12-12

## 2022-12-12 RX ORDER — DIVALPROEX SODIUM 500 MG/1
2000 TABLET, EXTENDED RELEASE ORAL
Status: CANCELLED | OUTPATIENT
Start: 2022-12-12

## 2022-12-12 RX ORDER — LISINOPRIL 20 MG/1
20 TABLET ORAL DAILY
Status: CANCELLED | OUTPATIENT
Start: 2022-12-13

## 2022-12-12 RX ORDER — DIPHENHYDRAMINE HYDROCHLORIDE 50 MG/ML
50 INJECTION INTRAMUSCULAR; INTRAVENOUS EVERY 6 HOURS PRN
Status: DISCONTINUED | OUTPATIENT
Start: 2022-12-12 | End: 2022-12-20 | Stop reason: HOSPADM

## 2022-12-12 RX ORDER — HALOPERIDOL 10 MG/1
5 TABLET ORAL 2 TIMES DAILY
Status: CANCELLED | OUTPATIENT
Start: 2022-12-12

## 2022-12-12 RX ORDER — HALOPERIDOL 5 MG/1
5 TABLET ORAL 2 TIMES DAILY
Status: DISCONTINUED | OUTPATIENT
Start: 2022-12-13 | End: 2022-12-14

## 2022-12-12 RX ORDER — SENNOSIDES 8.6 MG
1 TABLET ORAL 2 TIMES DAILY
Status: DISCONTINUED | OUTPATIENT
Start: 2022-12-12 | End: 2022-12-20 | Stop reason: HOSPADM

## 2022-12-12 RX ORDER — POLYETHYLENE GLYCOL 3350 17 G/17G
17 POWDER, FOR SOLUTION ORAL DAILY PRN
Status: CANCELLED | OUTPATIENT
Start: 2022-12-12

## 2022-12-12 RX ORDER — HALOPERIDOL 5 MG/ML
2.5 INJECTION INTRAMUSCULAR
Status: CANCELLED | OUTPATIENT
Start: 2022-12-12

## 2022-12-12 RX ORDER — OMEGA-3S/DHA/EPA/FISH OIL/D3 300MG-1000
400 CAPSULE ORAL DAILY
Status: DISCONTINUED | OUTPATIENT
Start: 2022-12-13 | End: 2022-12-20 | Stop reason: HOSPADM

## 2022-12-12 RX ORDER — ZIPRASIDONE HYDROCHLORIDE 40 MG/1
40 CAPSULE ORAL 2 TIMES DAILY WITH MEALS
Status: DISCONTINUED | OUTPATIENT
Start: 2022-12-12 | End: 2022-12-20 | Stop reason: HOSPADM

## 2022-12-12 RX ORDER — LORAZEPAM 2 MG/ML
2 INJECTION INTRAMUSCULAR EVERY 6 HOURS PRN
Status: CANCELLED | OUTPATIENT
Start: 2022-12-12

## 2022-12-12 RX ORDER — HALOPERIDOL 5 MG/ML
2.5 INJECTION INTRAMUSCULAR
Status: DISCONTINUED | OUTPATIENT
Start: 2022-12-12 | End: 2022-12-20 | Stop reason: HOSPADM

## 2022-12-12 RX ORDER — ASPIRIN 81 MG/1
81 TABLET, CHEWABLE ORAL DAILY
Status: CANCELLED | OUTPATIENT
Start: 2022-12-13

## 2022-12-12 RX ORDER — ZIPRASIDONE HYDROCHLORIDE 20 MG/1
40 CAPSULE ORAL 2 TIMES DAILY WITH MEALS
Status: CANCELLED | OUTPATIENT
Start: 2022-12-12

## 2022-12-12 RX ADMIN — ZIPRASIDONE HCL 40 MG: 40 CAPSULE ORAL at 21:33

## 2022-12-12 RX ADMIN — TRAZODONE HYDROCHLORIDE 50 MG: 50 TABLET ORAL at 21:33

## 2022-12-12 RX ADMIN — ATORVASTATIN CALCIUM 40 MG: 40 TABLET, FILM COATED ORAL at 16:29

## 2022-12-12 RX ADMIN — PRAZOSIN HYDROCHLORIDE 2 MG: 1 CAPSULE ORAL at 08:02

## 2022-12-12 RX ADMIN — ZIPRASIDONE HYDROCHLORIDE 40 MG: 20 CAPSULE ORAL at 08:02

## 2022-12-12 RX ADMIN — HALOPERIDOL 5 MG: 10 TABLET ORAL at 08:02

## 2022-12-12 RX ADMIN — CYANOCOBALAMIN TAB 500 MCG 1000 MCG: 500 TAB at 08:02

## 2022-12-12 RX ADMIN — FOLIC ACID 1 MG: 1 TABLET ORAL at 08:02

## 2022-12-12 RX ADMIN — CHOLECALCIFEROL (VITAMIN D3) 10 MCG (400 UNIT) TABLET 400 UNITS: at 08:03

## 2022-12-12 RX ADMIN — ASPIRIN 81 MG CHEWABLE TABLET 81 MG: 81 TABLET CHEWABLE at 08:03

## 2022-12-12 RX ADMIN — LURASIDONE HYDROCHLORIDE 80 MG: 80 TABLET, FILM COATED ORAL at 21:33

## 2022-12-12 RX ADMIN — DIVALPROEX SODIUM 2000 MG: 500 TABLET, EXTENDED RELEASE ORAL at 21:32

## 2022-12-12 RX ADMIN — SENNOSIDES 8.6 MG: 8.6 TABLET, FILM COATED ORAL at 08:02

## 2022-12-12 RX ADMIN — LISINOPRIL 20 MG: 20 TABLET ORAL at 08:02

## 2022-12-12 RX ADMIN — FLUOXETINE 40 MG: 20 CAPSULE ORAL at 08:02

## 2022-12-12 RX ADMIN — HALOPERIDOL 5 MG: 10 TABLET ORAL at 16:31

## 2022-12-12 RX ADMIN — MIRTAZAPINE 45 MG: 15 TABLET, FILM COATED ORAL at 21:32

## 2022-12-12 RX ADMIN — QUETIAPINE FUMARATE 25 MG: 25 TABLET ORAL at 21:33

## 2022-12-12 NOTE — ED NOTES
Attempted to follow up with [facility] regarding COB  Crisis was unable to get a live person in order to speak with a supervisor and was hung up on

## 2022-12-12 NOTE — EMTALA/ACUTE CARE TRANSFER
[facility] EMERGENCY DEPARTMENT  [address]  [place] [address]  Dept: [phone]      EMTALA TRANSFER CONSENT    NAME Leydi Pina                                         1970                              MRN [id number]    I have been informed of my rights regarding examination, treatment, and transfer   by Dr Rocio Sales DO    Benefits:      Risks: Potential for delay in receiving treatment, Potential deterioration of medical condition, Increased discomfort during transfer, Possible worsening of condition or death during transfer      Consent for Transfer:  I acknowledge that my medical condition has been evaluated and explained to me by the emergency department physician or other qualified medical person and/or my attending physician, who has recommended that I be transferred to the service of  Accepting Physician: Dr Mihaela Castillo at [address], [address] : [place], [place]  The above potential benefits of such transfer, the potential risks associated with such transfer, and the probable risks of not being transferred have been explained to me, and I fully understand them  The doctor has explained that, in my case, the benefits of transfer outweigh the risks  I agree to be transferred  I authorize the performance of emergency medical procedures and treatments upon me in both transit and upon arrival at the receiving facility  Additionally, I authorize the release of any and all medical records to the receiving facility and request they be transported with me, if possible  I understand that the safest mode of transportation during a medical emergency is an ambulance and that the Hospital advocates the use of this mode of transport   Risks of traveling to the receiving facility by car, including absence of medical control, life sustaining equipment, such as oxygen, and medical personnel has been explained to me and I fully understand them  (PRAKASH CORRECT BOX BELOW)  [  ]  I consent to the stated transfer and to be transported by ambulance/helicopter  [  ]  I consent to the stated transfer, but refuse transportation by ambulance and accept full responsibility for my transportation by car  I understand the risks of non-ambulance transfers and I exonerate the Hospital and its staff from any deterioration in my condition that results from this refusal     X___________________________________________    DATE  22  TIME________  Signature of patient or legally responsible individual signing on patient behalf           RELATIONSHIP TO PATIENT_________________________          Provider Certification    NAME Leydi Askew                                         1970                              MRN [id number]    A medical screening exam was performed on the above named patient  Based on the examination:    Condition Necessitating Transfer The primary encounter diagnosis was Depression  Diagnoses of Self mutilating behavior, Medical clearance for psychiatric admission, Hyperlipidemia, Primary hypertension, and Seizures ([facility] ) were also pertinent to this visit      Patient Condition: The patient has been stabilized such that within reasonable medical probability, no material deterioration of the patient condition or the condition of the unborn child(courtney) is likely to result from the transfer    Reason for Transfer: Level of Care needed not available at this facility (inpatient psych)    Transfer Requirements: [address] [place], [place]   · Space available and qualified personnel available for treatment as acknowledged by Elza  · Agreed to accept transfer and to provide appropriate medical treatment as acknowledged by       Dr Yoanna Bledsoe  · Appropriate medical records of the examination and treatment of the patient are provided at the time of transfer   [address] _______  · Transfer will be performed by qualified personnel from [facility]  and appropriate transfer equipment as required, including the use of necessary and appropriate life support measures  Provider Certification: I have examined the patient and explained the following risks and benefits of being transferred/refusing transfer to the patient/family:  General risk, such as traffic hazards, adverse weather conditions, rough terrain or turbulence, possible failure of equipment (including vehicle or aircraft), or consequences of actions of persons outside the control of the transport personnel, Unanticipated needs of medical equipment and personnel during transport, Risk of worsening condition, The possibility of a transport vehicle being unavailable, Consent was not obtained as patient is committed to psychiatric facility and transfer is mandated, The patient is stable for psychiatric transfer because they are medically stable, and is protected from harming him/herself or others during transport      Based on these reasonable risks and benefits to the patient and/or the unborn child(courtney), and based upon the information available at the time of the patient’s examination, I certify that the medical benefits reasonably to be expected from the provision of appropriate medical treatments at another medical facility outweigh the increasing risks, if any, to the individual’s medical condition, and in the case of labor to the unborn child, from effecting the transfer      X____________________________________________ DATE 12/12/22        TIME_______      ORIGINAL - SEND TO MEDICAL RECORDS   COPY - SEND WITH PATIENT DURING TRANSFER

## 2022-12-12 NOTE — ED NOTES
Patient is accepted at [facility]  Patient is accepted by Dr Justice Howe    per [address] is arranged with [facility]   Transportation is scheduled for **  Patient may go to the floor at t b d           Nurse report is to be called to [phone] prior to patient transfer

## 2022-12-12 NOTE — ED NOTES
Insurance Authorization for admission:   Medicare primary, no auth required  Phone call placed to [facility]  Phone number: [phone]    Spoke to **      ** days approved  Level of care: inpatient mental health  Review on **  Authorization # **         EVS (Eligibility Verification System) called - [phone]    Automated system indicates: ACTIVE

## 2022-12-12 NOTE — ED NOTES
Bed Search    [facility]): no bed  Lora Knapp [facility]): exhausted therapeutic benefits  Varinder (Varun): clinical faxed  Abby Fritz): no bed  [facility] Maribell Venegas): no beds  [id number] ([id number]): no appropriate bed due to [employer]: call back after [address]): clinical faxed  Kitty Weeks): declined to no appropriate bed at this time

## 2022-12-12 NOTE — ED NOTES
Yayo at Intake reported pt to be referred to [facility], 2W  Pt will need will COVID test   Provider notified

## 2022-12-13 LAB
25(OH)D3 SERPL-MCNC: 18 NG/ML (ref 30–100)
ALBUMIN SERPL BCP-MCNC: 3.6 G/DL (ref 3.5–5)
ALP SERPL-CCNC: 69 U/L (ref 46–116)
ALT SERPL W P-5'-P-CCNC: 25 U/L (ref 12–78)
ANION GAP SERPL CALCULATED.3IONS-SCNC: 13 MMOL/L (ref 4–13)
AST SERPL W P-5'-P-CCNC: 18 U/L (ref 5–45)
BASOPHILS # BLD MANUAL: 0 THOUSAND/UL (ref 0–0.1)
BASOPHILS NFR MAR MANUAL: 0 % (ref 0–1)
BILIRUB SERPL-MCNC: 0.5 MG/DL (ref 0.2–1)
BUN SERPL-MCNC: 14 MG/DL (ref 5–25)
CALCIUM SERPL-MCNC: 9 MG/DL (ref 8.3–10.1)
CHLORIDE SERPL-SCNC: 99 MMOL/L (ref 96–108)
CHOLEST SERPL-MCNC: 177 MG/DL
CO2 SERPL-SCNC: 26 MMOL/L (ref 21–32)
CREAT SERPL-MCNC: 0.7 MG/DL (ref 0.6–1.3)
EOSINOPHIL # BLD MANUAL: 0.52 THOUSAND/UL (ref 0–0.4)
EOSINOPHIL NFR BLD MANUAL: 5 % (ref 0–6)
ERYTHROCYTE [DISTWIDTH] IN BLOOD BY AUTOMATED COUNT: 13.3 % (ref 11.6–15.1)
EST. AVERAGE GLUCOSE BLD GHB EST-MCNC: 88 MG/DL
GFR SERPL CREATININE-BSD FRML MDRD: 100 ML/MIN/1.73SQ M
GLUCOSE P FAST SERPL-MCNC: 130 MG/DL (ref 65–99)
GLUCOSE SERPL-MCNC: 130 MG/DL (ref 65–140)
HBA1C MFR BLD: 4.7 %
HCT VFR BLD AUTO: 41 % (ref 34.8–46.1)
HDLC SERPL-MCNC: 40 MG/DL
HGB BLD-MCNC: 13.6 G/DL (ref 11.5–15.4)
LDLC SERPL CALC-MCNC: 88 MG/DL (ref 0–100)
LYMPHOCYTES # BLD AUTO: 2.81 THOUSAND/UL (ref 0.6–4.47)
LYMPHOCYTES # BLD AUTO: 27 % (ref 14–44)
MCH RBC QN AUTO: 32.6 PG (ref 26.8–34.3)
MCHC RBC AUTO-ENTMCNC: 33.2 G/DL (ref 31.4–37.4)
MCV RBC AUTO: 98 FL (ref 82–98)
MONOCYTES # BLD AUTO: 1.04 THOUSAND/UL (ref 0–1.22)
MONOCYTES NFR BLD: 10 % (ref 4–12)
MYELOCYTES NFR BLD MANUAL: 2 % (ref 0–1)
NEUTROPHILS # BLD MANUAL: 5.83 THOUSAND/UL (ref 1.85–7.62)
NEUTS SEG NFR BLD AUTO: 56 % (ref 43–75)
NONHDLC SERPL-MCNC: 137 MG/DL
PLATELET # BLD AUTO: 237 THOUSANDS/UL (ref 149–390)
PLATELET BLD QL SMEAR: ADEQUATE
PMV BLD AUTO: 9.9 FL (ref 8.9–12.7)
POTASSIUM SERPL-SCNC: 4.1 MMOL/L (ref 3.5–5.3)
PROT SERPL-MCNC: 8 G/DL (ref 6.4–8.4)
RBC # BLD AUTO: 4.17 MILLION/UL (ref 3.81–5.12)
RPR SER QL: NORMAL
SODIUM SERPL-SCNC: 138 MMOL/L (ref 135–147)
TRIGL SERPL-MCNC: 244 MG/DL
TSH SERPL DL<=0.05 MIU/L-ACNC: 3.26 UIU/ML (ref 0.45–4.5)
WBC # BLD AUTO: 10.41 THOUSAND/UL (ref 4.31–10.16)

## 2022-12-13 RX ORDER — GUAIFENESIN 600 MG/1
600 TABLET, EXTENDED RELEASE ORAL EVERY 12 HOURS SCHEDULED
Status: DISCONTINUED | OUTPATIENT
Start: 2022-12-13 | End: 2022-12-20 | Stop reason: HOSPADM

## 2022-12-13 RX ORDER — PRAZOSIN HYDROCHLORIDE 1 MG/1
2 CAPSULE ORAL
Status: DISCONTINUED | OUTPATIENT
Start: 2022-12-13 | End: 2022-12-20 | Stop reason: HOSPADM

## 2022-12-13 RX ORDER — FLUTICASONE PROPIONATE 50 MCG
1 SPRAY, SUSPENSION (ML) NASAL DAILY
Status: DISCONTINUED | OUTPATIENT
Start: 2022-12-13 | End: 2022-12-20 | Stop reason: HOSPADM

## 2022-12-13 RX ORDER — BENZONATATE 100 MG/1
100 CAPSULE ORAL 3 TIMES DAILY PRN
Status: DISCONTINUED | OUTPATIENT
Start: 2022-12-13 | End: 2022-12-20 | Stop reason: HOSPADM

## 2022-12-13 RX ADMIN — QUETIAPINE FUMARATE 25 MG: 25 TABLET ORAL at 21:12

## 2022-12-13 RX ADMIN — MIRTAZAPINE 45 MG: 15 TABLET, FILM COATED ORAL at 21:11

## 2022-12-13 RX ADMIN — ATORVASTATIN CALCIUM 40 MG: 40 TABLET, FILM COATED ORAL at 16:02

## 2022-12-13 RX ADMIN — CHOLECALCIFEROL (VITAMIN D3) 10 MCG (400 UNIT) TABLET 400 UNITS: at 09:14

## 2022-12-13 RX ADMIN — ZIPRASIDONE HCL 40 MG: 40 CAPSULE ORAL at 16:02

## 2022-12-13 RX ADMIN — FLUTICASONE PROPIONATE 1 SPRAY: 50 SPRAY, METERED NASAL at 14:10

## 2022-12-13 RX ADMIN — LURASIDONE HYDROCHLORIDE 80 MG: 80 TABLET, FILM COATED ORAL at 21:12

## 2022-12-13 RX ADMIN — HALOPERIDOL 5 MG: 5 TABLET ORAL at 17:41

## 2022-12-13 RX ADMIN — GUAIFENESIN 600 MG: 600 TABLET ORAL at 21:15

## 2022-12-13 RX ADMIN — FLUOXETINE 40 MG: 20 CAPSULE ORAL at 09:08

## 2022-12-13 RX ADMIN — PRAZOSIN HYDROCHLORIDE 2 MG: 1 CAPSULE ORAL at 21:12

## 2022-12-13 RX ADMIN — DIVALPROEX SODIUM 2000 MG: 500 TABLET, EXTENDED RELEASE ORAL at 21:12

## 2022-12-13 RX ADMIN — HALOPERIDOL 5 MG: 5 TABLET ORAL at 09:11

## 2022-12-13 RX ADMIN — CYANOCOBALAMIN TAB 500 MCG 1000 MCG: 500 TAB at 09:10

## 2022-12-13 RX ADMIN — LISINOPRIL 20 MG: 20 TABLET ORAL at 09:07

## 2022-12-13 RX ADMIN — ASPIRIN 81 MG CHEWABLE TABLET 81 MG: 81 TABLET CHEWABLE at 09:07

## 2022-12-13 RX ADMIN — FOLIC ACID 1 MG: 1 TABLET ORAL at 09:09

## 2022-12-13 RX ADMIN — TRAZODONE HYDROCHLORIDE 50 MG: 50 TABLET ORAL at 21:12

## 2022-12-13 RX ADMIN — ZIPRASIDONE HCL 40 MG: 40 CAPSULE ORAL at 09:12

## 2022-12-13 RX ADMIN — ACETAMINOPHEN 975 MG: 325 TABLET, FILM COATED ORAL at 16:02

## 2022-12-13 NOTE — TREATMENT TEAM
12/13/22 1920   Activity/Group Checklist   Group Community meeting   Attendance Attended   Attendance Duration (min) 16-30   Interactions Interacted appropriately   Affect/Mood Appropriate   Goals Achieved Able to listen to others; Able to engage in interactions; Other (Comment)  (identified goals for the day)

## 2022-12-13 NOTE — PLAN OF CARE
Problem: Ineffective Coping  Goal: Cooperates with admission process  Description: Interventions:   - Complete admission process  Outcome: Progressing     Problem: Risk for Self Injury/Neglect  Goal: Verbalize thoughts and feelings  Description: Interventions:  - Assess and re-assess patient's lethality and potential for self-injury  - Engage patient in 1:1 interactions, daily, for a minimum of 15 minutes  - Encourage patient to express feelings, fears, frustrations, hopes  - Establish rapport/trust with patient   Outcome: Progressing  Goal: Refrain from harming self  Description: Interventions:  - Monitor patient closely, per order  - Develop a trusting relationship  - Supervise medication ingestion, monitor effects and side effects   Outcome: Progressing

## 2022-12-13 NOTE — PROGRESS NOTES
Status: Pt is a 89 from [facility], who presented with SI to cut her throat, after relationship issues  Pt pleasant & cooperative upon arrival to the unit  She is malodorous, even after showering  Pt has had multiple inpatient admissions to [facility] (11th since January 2021)  Pt requesting that residence bring clothing & underwear for her  Pt appeared to have slept overnight  She reports she hopes to discharge by the holiday  Reviewed readmit score: 25 (RED), AUDIT: 2, PAWSS: 0, BAT: none, UDS: negative  Medication: to be reviewed / no PRNs  D/C: TBD / new admission     12/13/22 0750   Team Meeting   Meeting Type Daily Rounds   Team Members Present   Team Members Present Physician;Nurse; Other (Discipline and Name);    Physician Team Member Dr Alcira Modi / Vickie Nagel / Юлия Meza Team Member DANDRE [facility] Management Team Member Jarad Richter / Paul Marcos / Demarcus Garcia   Other (Discipline and Name) Cuco(art therapy)   Patient/Family Present   Patient Present No   Patient's Family Present No
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
## 2022-12-13 NOTE — ASSESSMENT & PLAN NOTE
· History of seizure disorder, last seizure occurred 6 years ago  · Maintained on Depakote 2000 mg at bedtime  · Seizure precautions

## 2022-12-13 NOTE — NURSING NOTE
Patient asked how she was doing and reported "not too good right now "  RN asked what was wrong, pt explained she has a migraine  PRN Tylenol was given for 8/10 head pain and informed she would follow up in 1 hour for effectiveness  Pt denied SI, admits to feeling safe here and reports pta she was spending her money frivolously on her peers, stating, "I am too kind hearted I don't know what to say "   Pt hoping to work on this upon discharge

## 2022-12-13 NOTE — TREATMENT PLAN
TREATMENT PLAN REVIEW - Dg [age] y o  1970 female MRN: [id number]    [address] [place] [address] Room / Bed: [facility] 256/[facility] 795-74 Encounter: [id number]          Admit Date/Time:  12/12/2022  7:49 PM    Treatment Team: Attending Provider: Billie Gomez MD; Security: Mickey Ordonez; Patient Care Assistant: Marline Lanes; Care Manager: Jade Shaw RN; Patient Care Technician: Jarred Roberts; Registered Nurse: Sergio Moreno, RN; Charge Nurse: Hasmukh Agudelo, RN; Registered Nurse: Rajiv Castillo RN    Diagnosis: Principal Problem:    Major depression with psychotic features [facility])  Active Problems:    Primary hypertension    Hyperlipidemia    Class 3 severe obesity due to excess calories without serious comorbidity in adult [facility])    Chronic midline low back pain without sciatica    PTSD (post-traumatic stress disorder)    Borderline personality disorder ([facility] )      Patient Strengths/Assets: cooperative, compliant with medication, motivation for treatment/growth, patient is on a voluntary commitment    Patient Barriers/Limitations: limited support system, marital/family conflict    Short Term Goals: decrease in depressive symptoms, decrease in anxiety symptoms, decrease in paranoid thoughts, decrease in psychotic symptoms, decrease in suicidal thoughts, decrease in self abusive behaviors, decrease in level of agitation, improvement in insight, improvement in self care, sleep improvement, improvement in appetite    Long Term Goals: improvement in depression, improvement in anxiety, resolution of depressive symptoms, resolution of manic symptoms, stabilization of mood, free of suicidal thoughts, no self abusive behavior, improved insight, adequate self care, adequate sleep, adequate appetite    Progress Towards Goals: starting psychiatric medications as prescribed    Recommended Treatment: medication management, patient medication education, group therapy, milieu therapy, continued Behavioral Health psychiatric evaluation/assessment process    Treatment Frequency: daily medication monitoring, group and milieu therapy daily, monitoring through interdisciplinary rounds, monitoring through weekly patient care conferences    Expected Discharge Date:  5-7 days    Discharge Plan: referral for outpatient medication management with a psychiatrist, referral for outpatient psychotherapy    Treatment Plan Created/Updated By: Earle Christianson MD

## 2022-12-13 NOTE — NURSING NOTE
Polite, cooperative and social   Present in milieu, attending groups and working on word puzzles  ADLs completed  Compliant with meals and medications  Denies SI/HI and hallucinations  Smiling and friendly throughout the day  Plans to stay for at least a week and be home for [place]  Spoke briefly about new boyfriend at [facility]
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
## 2022-12-13 NOTE — PLAN OF CARE
Problem: Risk for Self Injury/Neglect  Goal: Verbalize thoughts and feelings  Description: Interventions:  - Assess and re-assess patient's lethality and potential for self-injury  - Engage patient in 1:1 interactions, daily, for a minimum of 15 minutes  - Encourage patient to express feelings, fears, frustrations, hopes  - Establish rapport/trust with patient   Outcome: Progressing  Goal: Refrain from harming self  Description: Interventions:  - Monitor patient closely, per order  - Develop a trusting relationship  - Supervise medication ingestion, monitor effects and side effects   Outcome: Progressing  Goal: Complete daily ADLs, including personal hygiene independently, as able  Description: Interventions:  - Observe, teach, and assist patient with ADLS  - Monitor and promote a balance of rest/activity, with adequate nutrition and elimination  Outcome: Progressing     Problem: Depression  Goal: Verbalize thoughts and feelings  Description: Interventions:  - Assess and re-assess patient's level of risk   - Engage patient in 1:1 interactions, daily, for a minimum of 15 minutes   - Encourage patient to express feelings, fears, frustrations, hopes   Outcome: Progressing  Goal: Refrain from harming self  Description: Interventions:  - Monitor patient closely, per order   - Supervise medication ingestion, monitor effects and side effects   Outcome: Progressing  Goal: Refrain from isolation  Description: Interventions:  - Develop a trusting relationship   - Encourage socialization   Outcome: Progressing     Problem: Anxiety  Goal: Anxiety is at manageable level  Description: Interventions:  - Assess and monitor patient's anxiety level  - Monitor for signs and symptoms (heart palpitations, chest pain, shortness of breath, headaches, nausea, feeling jumpy, restlessness, irritable, apprehensive)  - Collaborate with interdisciplinary team and initiate plan and interventions as ordered    - [place] patient to unit/surroundings  - Explain treatment plan  - Encourage participation in care  - Encourage verbalization of concerns/fears  - Identify coping mechanisms  - Assist in developing anxiety-reducing skills  - Administer/offer alternative therapies  - Limit or eliminate stimulants  Outcome: Progressing     Problem: SAFETY ADULT  Goal: Patient will remain free of falls  Description: INTERVENTIONS:  - Educate patient/family on patient safety including physical limitations  - Instruct patient to call for assistance with activity   - Consult OT/PT to assist with strengthening/mobility   - Keep Call bell within reach  - Keep bed low and locked with side rails adjusted as appropriate  - Keep care items and personal belongings within reach  - Initiate and maintain comfort rounds  - Make Fall Risk Sign visible to staff  - Apply yellow socks and bracelet for high fall risk patients  - Consider moving patient to room near nurses station  Outcome: Progressing

## 2022-12-13 NOTE — CONSULTS
[address] 1970, 47 y o  female MRN: [id number]  Unit/Bed#: [facility] 256-01 Encounter: [id number]  Primary Care Provider: Rodrigo Sutton MD   Date and time admitted to hospital: 12/12/2022  7:49 PM    Inpatient consult for Medical Clearance for [address] patient  Consult performed by: Sylvester Perkins PA-C  Consult ordered by: Yue Ballesteros PA-C          Medical clearance for psychiatric admission  Assessment & Plan  • Admission labs reviewed, CBC, CMP acceptable  • Vitals stable   • UDS negative  • COVID-19 negative  • EKG pending   • Medically stable for continued inpatient psychiatric treatment based on available results      Hyperlipidemia  Assessment & Plan  · Continue atorvastatin     Seizures ([facility] )  Assessment & Plan  · History of seizure disorder, last seizure occurred 6 years ago  · Maintained on Depakote 2000 mg at bedtime  · Seizure precautions    Borderline personality disorder ([facility] )  Assessment & Plan  · Treatment per psychiatry     PTSD (post-traumatic stress disorder)  Assessment & Plan  · Treatment per psychiatry    Chronic midline low back pain without sciatica  Assessment & Plan  · Supportive care while inpatient    Class 3 severe obesity due to excess calories without serious comorbidity in adult [facility])  Assessment & Plan  · Lifestyle modifications    Primary hypertension  Assessment & Plan  · Continue lisinopril 20 mg     * Major depression with psychotic features [facility])  Assessment & Plan  · Treatment per psychiatry    Counseling / Coordination of Care Time: 30 minutes  Greater than 50% of total time spent on patient counseling and coordination of care  Collaboration of Care:  Were Recommendations Directly Discussed with Primary Treatment Team? - No     History of Present Illness:    Estrellita Lala is a 47 y o  female with PMH HTN, HLD, obesity, PTSD, MDD, borderline personality disorder, seizure disorder, high behavioral health utilizer who is originally admitted to the psychiatry service due to expression of SI/self-harm behavior  We are consulted for medical clearance for admission to [facility] and treatment of underlying psychiatric illness  Discussed patient's medical history with her as above, verified home medications  We will continue all previously prescribed complications while on unit  She denies alcohol use, tobacco use, drug use  Patient currently reports she has "bad cough" that started 4 days ago  She denies associated fevers or chills, headache/dizziness, sore throat, chest pain, palpitations, shortness of breath or difficulty breathing, nausea/vomiting/abdominal pain/diarrhea, dysuria, hematuria, rashes or wounds  No lacerations on forearms/other areas of body  Patient denies additional complaints  Based on all available data, patient peers medically stable to continue with inpatient psychiatric treatment  Review of Systems:    Review of Systems   Constitutional: Negative for chills, fatigue and fever  HENT: Positive for congestion and postnasal drip  Negative for rhinorrhea and sore throat  Eyes: Negative for visual disturbance  Respiratory: Positive for cough  Negative for chest tightness, shortness of breath and wheezing  Cardiovascular: Negative for chest pain and palpitations  Gastrointestinal: Negative for abdominal pain, constipation, diarrhea, nausea and vomiting  Genitourinary: Negative for difficulty urinating, dysuria, frequency and urgency  Musculoskeletal: Negative for arthralgias, back pain and myalgias  Skin: Negative for rash and wound  Neurological: Negative for dizziness, light-headedness and headaches  All other systems reviewed and are negative        Past Medical and Surgical History:     Past Medical History:   Diagnosis Date   • Anxiety    • Bipolar 1 disorder ([facility] )    • Bipolar affective disorder, depressed, severe ([facility] ) 10/10/2021   • Borderline personality disorder ([place] [address] )    • CAD (coronary artery disease)    • Cognitive impairment    • Depression    • Dyslipidemia    • GERD (gastroesophageal reflux disease)    • Hypertension    • Obesity    • Psychiatric disorder    • Psychiatric illness    • PTSD (post-traumatic stress disorder)    • Schizoaffective disorder ([place] [address] )    • Seizure ([place] [address] )        Past Surgical History:   Procedure Laterality Date   • APPENDECTOMY      PATIENT DENIES HAVING APPENDIX REMOVED   • CHOLECYSTECTOMY     • FOOT SURGERY Right        Meds/Allergies:    PTA meds:   Prior to Admission Medications   Prescriptions Last Dose Informant Patient Reported? Taking?    Cyanocobalamin (Vitamin B 12) 500 MCG TABS   Yes No   Sig: Take 1,000 mcg by mouth in the morning   FLUoxetine (PROzac) 20 mg capsule   No No   Sig: Take 1 capsule (20 mg total) by mouth daily   Patient taking differently: Take 40 mg by mouth daily   Latuda 80 MG tablet   Yes No   QUEtiapine (SEROquel) 25 mg tablet   Yes No   Sig: Take 25 mg by mouth daily at bedtime   aspirin 81 mg chewable tablet   No No   Sig: Chew 1 tablet (81 mg total) daily   atorvastatin (LIPITOR) 40 mg tablet   No No   Sig: Take 1 tablet (40 mg total) by mouth daily with dinner   cholecalciferol (VITAMIN D3) 400 units tablet   No No   Sig: Take 1 tablet (400 Units total) by mouth daily   divalproex sodium (DEPAKOTE ER) 500 mg 24 hr tablet   No No   Sig: Take 5 tablets (2,500 mg total) by mouth daily at bedtime   Patient taking differently: Take 2,000 mg by mouth daily at bedtime   folic acid (FOLVITE) 1 mg tablet   Yes No   Sig: Take 1 mg by mouth daily   haloperidol (HALDOL) 10 mg tablet   No No   Sig: Take 1 tablet (10 mg total) by mouth 2 (two) times a day Take 1 tablet in the morning and 1 tablet before bedtime   Patient taking differently: Take 5 mg by mouth 2 (two) times a day Take 1 tablet in the morning and 1 tablet before bedtime   hydrOXYzine HCL (ATARAX) 50 mg tablet   Yes No   Si mg every 6 (six) hours as needed   ibuprofen (MOTRIN) 600 mg tablet   Yes No   levOCARNitine (CARNITOR) 330 MG tablet   Yes No   Sig: Take 330 mg by mouth in the morning   lisinopril (ZESTRIL) 20 mg tablet   No No   Sig: Take 1 tablet (20 mg total) by mouth daily   mirtazapine (REMERON) 45 MG tablet   No No   Sig: Take 1 tablet (45 mg total) by mouth daily at bedtime   prazosin (MINIPRESS) 2 mg capsule   No No   Sig: Take 1 capsule (2 mg total) by mouth daily at bedtime   senna-docusate sodium (SENOKOT S) 8 6-50 mg per tablet   No No   Sig: Take 1 tablet by mouth 2 (two) times a day   traZODone (DESYREL) 50 mg tablet   Yes No   Sig: Take 50 mg by mouth daily at bedtime   ziprasidone (GEODON) 40 mg capsule   Yes No   Si mg 2 (two) times a day with meals      Facility-Administered Medications: None       Allergies: Allergies   Allergen Reactions   • Fish Oil - Food Allergy Other (See Comments)     unknown   • Fish-Derived Products - Food Allergy Hives       Social History:     Marital Status: Single    Substance Use History:   Social History     Substance and Sexual Activity   Alcohol Use Yes    Comment: occasionally     Social History     Tobacco Use   Smoking Status Former   Smokeless Tobacco Never   Tobacco Comments    Pt stated "smokes when stressed"     Social History     Substance and Sexual Activity   Drug Use Not Currently       Family History:    Family History   Problem Relation Age of Onset   • Kidney cancer Mother    • Cerebral aneurysm Father        Physical Exam:     Vitals:   Blood Pressure: 101/73 (22 1114)  Pulse: 85 (22 1114)  Temperature: 97 6 °F (36 4 °C) (22 1114)  Temp Source: Tympanic (22 1114)  Respirations: 16 (22 1114)  Height: 5' 3" (160 cm) (22)  Weight - Scale: 117 kg (257 lb) (waist: 46 inches) (22)  SpO2: 98 % (22 5929)    Physical Exam  Vitals and nursing note reviewed  Constitutional:       General: She is not in acute distress       Appearance: She is obese  She is not ill-appearing  HENT:      Head: Normocephalic and atraumatic  Nose: Nose normal    Eyes:      General:         Right eye: No discharge  Left eye: No discharge  Extraocular Movements: Extraocular movements intact  Conjunctiva/sclera: Conjunctivae normal    Cardiovascular:      Rate and Rhythm: Normal rate and regular rhythm  Heart sounds: Normal heart sounds  No murmur heard  Pulmonary:      Effort: Pulmonary effort is normal  No respiratory distress  Breath sounds: Normal breath sounds  No wheezing, rhonchi or rales  Abdominal:      General: Bowel sounds are normal       Palpations: Abdomen is soft  Tenderness: There is no abdominal tenderness  There is no guarding  Musculoskeletal:      Right lower leg: No edema  Left lower leg: No edema  Skin:     General: Skin is warm and dry  Neurological:      Mental Status: She is alert and oriented to person, place, and time  Psychiatric:         Mood and Affect: Mood normal          Behavior: Behavior normal          Additional Data:     Lab Results: I have personally reviewed pertinent reports        Results from last 7 days   Lab Units 12/13/22  0747   WBC Thousand/uL 10 41*   HEMOGLOBIN g/dL 13 6   HEMATOCRIT % 41 0   PLATELETS Thousands/uL 237   LYMPHO PCT % 27   MONO PCT % 10   EOS PCT % 5     Results from last 7 days   Lab Units 12/13/22  0747   SODIUM mmol/L 138   POTASSIUM mmol/L 4 1   CHLORIDE mmol/L 99   CO2 mmol/L 26   BUN mg/dL 14   CREATININE mg/dL 0 70   ANION GAP mmol/L 13   CALCIUM mg/dL 9 0   ALBUMIN g/dL 3 6   TOTAL BILIRUBIN mg/dL 0 50   ALK PHOS U/L 69   ALT U/L 25   AST U/L 18   GLUCOSE RANDOM mg/dL 130             Lab Results   Component Value Date/Time    HGBA1C 4 9 07/05/2022 06:39 AM    HGBA1C 4 8 12/31/2021 07:22 AM    HGBA1C 4 8 08/31/2021 07:10 AM    HGBA1C 4 7 05/25/2021 07:10 AM           EKG, Pathology, and Other Studies Reviewed on Admission:   · EKG pending     ** Please Note: This note has been constructed using a voice recognition system   **

## 2022-12-13 NOTE — ASSESSMENT & PLAN NOTE
• Admission labs reviewed, CBC, CMP acceptable  • Vitals stable   • UDS negative  • COVID-19 negative  • EKG pending   • Medically stable for continued inpatient psychiatric treatment based on available results

## 2022-12-13 NOTE — CASE MANAGEMENT
Readmit score:  25(RED)   Confirmed Address:        [place]: [address] [facility] ([address], [address], [address] [facility] ([facility], [facility] [address] [place] [address] for GPS)     [facility]   Resides in the [facility]:    [facility]  Pt does have her own room with her own mini fridge & TV  Pt said that she thinks she would like to have a roommate, as she gets lonely  Pt Will Return Home at Discharge: Yes   Confirmed Phone Number: (residence) [phone] Fax: [phone]  Care Coordinator - Benny Nguyen ext  [phone] or Yayo ext  [phone]   Confirmed Email Address: None    Marital Status:         Children: Single, never      None   Family History:            Adopted mother -   Does have some aunts but minimally involved  Commitment Status:    Status change:  201      Admitted from: [facility] 2022 @ 2231   Presenting C/O:        Pt reported she had SI after her boyfriend wasn't spending enough time with her  Pt said that they spend time watching horror movies in his room  Past Inpatient Tx:    2022 to 8/3/2022: [facility]  2022 to 2022: [facility]  5/15/2022 to 2022: [facility]  2022 to 2022: [facility] [address]  2022 to 2022: [facility] [address] [place] [address]  2/10/2022 to 2022: [facility]  2021 to 2022: [facility]  -: [facility]  -2021: [facility]  -: [facility]  10/9-15/2021: [facility]  2021: [facility]  2021: Chandrika  2021: Lindsey  2021: Linda Omer  : Chandrika  2021: Jhon [place]  -: DANNY CUEVASU  Pt reported first hospitalization at age 18 y/o   Current outpatient:     Psychiatrist:    Mario Obrien - Dr Jeaneen Klinefelter  [phone] F  [phone]   Therapist: Syed Client ext  [phone]   ACT/ICM/CPS/WRT/SC: N/A has residential care coordinator - Sanam   PCP:    Unsure   Hx:    Pt denied any current medical concerns    Pt reported last seizure was over 4 yrs ago Medications:    Pt reported she takes her medications  Pharmacy:    [facility]  F  [phone]   Spirituality/Hinduism:    [facility] / no request   Education:    12th grade / special education   Work/Income:     Preferred time for appts: SSDI - unknown amount        Residence schedules   Legal:  Pt denied   Access to Firearms: Pt denied   Transportation: Residence provides transportation as needed  Strength: Pt identified a strength is that she is kind hearted  Coping Skills: Pt identified coping skills of doing word searches, or listening to country or R&B music  Goal:  Pt identified her goal is to stay longer so that she doesn't have to come back to the hospital     Referrals Needed: None - Pt lives in a [facility] who provide for all her needs  She has outpatient at [facility]  Transport at Discharge: [address] will transport home  IMM:  12/14/2022 Brian Srivastava PRECIOUS: N/A   Emergency Contact:     [facility][phone]   ROIs obtained:    [facility] - outpatient & residential   Insurance:     Medicare A+B   COB: [address]    Audit: 0       PAWSS: 0 BAT: 0 UDS: negative   Substance Abuse: Freq   Amount Last Use Notes:   Heroin           Amp/Meth           Alcohol           Cocaine           Cannabis           Benzodiazepine           Barbiturates           Tobacco Pt denied any current smoking

## 2022-12-13 NOTE — NURSING NOTE
Leydi scored "Moderate Risk" on [facility] C-SSRS  Currently, patient is "Low Risk," endorses feeling of safety on the unit and denies current SI/HI/AH/VH  Patient encouraged to seek staff with any issues and/or concerns, verbalized understanding

## 2022-12-13 NOTE — TREATMENT TEAM
12/13/22 1000   Activity/Group Checklist   Group Exercise  (chair yoga)   Attendance Attended   Attendance Duration (min) 16-30   Interactions Interacted appropriately   Affect/Mood Appropriate   Goals Achieved Other (Comment)  (observed peers)

## 2022-12-14 LAB
ATRIAL RATE: 81 BPM
P AXIS: 45 DEGREES
PR INTERVAL: 214 MS
QRS AXIS: 35 DEGREES
QRSD INTERVAL: 84 MS
QT INTERVAL: 376 MS
QTC INTERVAL: 436 MS
T WAVE AXIS: 54 DEGREES
VENTRICULAR RATE: 81 BPM

## 2022-12-14 RX ORDER — HALOPERIDOL 10 MG/1
10 TABLET ORAL 2 TIMES DAILY
Status: DISCONTINUED | OUTPATIENT
Start: 2022-12-14 | End: 2022-12-20 | Stop reason: HOSPADM

## 2022-12-14 RX ADMIN — DIVALPROEX SODIUM 2000 MG: 500 TABLET, EXTENDED RELEASE ORAL at 21:20

## 2022-12-14 RX ADMIN — LISINOPRIL 20 MG: 20 TABLET ORAL at 08:19

## 2022-12-14 RX ADMIN — ZIPRASIDONE HCL 40 MG: 40 CAPSULE ORAL at 08:18

## 2022-12-14 RX ADMIN — GUAIFENESIN 600 MG: 600 TABLET ORAL at 21:20

## 2022-12-14 RX ADMIN — FLUTICASONE PROPIONATE 1 SPRAY: 50 SPRAY, METERED NASAL at 08:18

## 2022-12-14 RX ADMIN — ACETAMINOPHEN 975 MG: 325 TABLET, FILM COATED ORAL at 15:35

## 2022-12-14 RX ADMIN — GUAIFENESIN 600 MG: 600 TABLET ORAL at 08:18

## 2022-12-14 RX ADMIN — ASPIRIN 81 MG CHEWABLE TABLET 81 MG: 81 TABLET CHEWABLE at 08:16

## 2022-12-14 RX ADMIN — ATORVASTATIN CALCIUM 40 MG: 40 TABLET, FILM COATED ORAL at 15:36

## 2022-12-14 RX ADMIN — ZIPRASIDONE HCL 40 MG: 40 CAPSULE ORAL at 15:36

## 2022-12-14 RX ADMIN — HALOPERIDOL 10 MG: 10 TABLET ORAL at 17:39

## 2022-12-14 RX ADMIN — HALOPERIDOL 5 MG: 5 TABLET ORAL at 08:16

## 2022-12-14 RX ADMIN — CYANOCOBALAMIN TAB 500 MCG 1000 MCG: 500 TAB at 08:17

## 2022-12-14 RX ADMIN — CHOLECALCIFEROL (VITAMIN D3) 10 MCG (400 UNIT) TABLET 400 UNITS: at 08:18

## 2022-12-14 RX ADMIN — FLUOXETINE 40 MG: 20 CAPSULE ORAL at 08:17

## 2022-12-14 RX ADMIN — MIRTAZAPINE 45 MG: 15 TABLET, FILM COATED ORAL at 21:20

## 2022-12-14 RX ADMIN — PRAZOSIN HYDROCHLORIDE 2 MG: 1 CAPSULE ORAL at 21:20

## 2022-12-14 RX ADMIN — TRAZODONE HYDROCHLORIDE 50 MG: 50 TABLET ORAL at 21:20

## 2022-12-14 RX ADMIN — FOLIC ACID 1 MG: 1 TABLET ORAL at 08:16

## 2022-12-14 RX ADMIN — LURASIDONE HYDROCHLORIDE 40 MG: 40 TABLET, FILM COATED ORAL at 21:23

## 2022-12-14 NOTE — PROGRESS NOTES
Progress Note - Behavioral Health   Leydi Katina Bolaños 47 y o  female MRN: [id number]  Unit/Bed#: [facility] 256-01 Encounter: [id number]    Assessment/Plan   Principal Problem:    Major depression with psychotic features ([facility] )  Active Problems:    Primary hypertension    Hyperlipidemia    Class 3 severe obesity due to excess calories without serious comorbidity in [facility])    Chronic midline low back pain without sciatica    PTSD (post-traumatic stress disorder)    Borderline personality disorder ([facility] )    Medical clearance for psychiatric admission    Seizures ([facility] )      Subjective: Patient was seen, chart was reviewed, and case was discussed with the team  Patient continues to endorse depressed mood and negative voices  Denies any commands  Sleep and appetite are good  Denies any thoughts to hurt self  She feels safe in the hospital  She is compliant with medications   Denies any side effects  Behavior over the last 24 hours:  unchanged  Sleep: normal  Appetite: normal  Medication side effects: No    Medical ROS: Pertinent items are noted in HPI all other systems are negative    Current Medications:  Current Facility-Administered Medications   Medication Dose Route Frequency   • acetaminophen (TYLENOL) tablet 650 mg  650 mg Oral Q6H PRN   • acetaminophen (TYLENOL) tablet 650 mg  650 mg Oral Q4H PRN   • acetaminophen (TYLENOL) tablet 975 mg  975 mg Oral Q6H PRN   • aluminum-magnesium hydroxide-simethicone (MYLANTA) oral suspension 30 mL  30 mL Oral Q4H PRN   • aspirin chewable tablet 81 mg  81 mg Oral Daily   • atorvastatin (LIPITOR) tablet 40 mg  40 mg Oral Daily With Dinner   • benzonatate (TESSALON PERLES) capsule 100 mg  100 mg Oral TID PRN   • haloperidol lactate (HALDOL) injection 2 5 mg  2 5 mg Intramuscular Q6H PRN Max 4/day    And   • LORazepam (ATIVAN) injection 1 mg  1 mg Intramuscular Q6H PRN Max 4/day    And   • benztropine (COGENTIN) injection 0 5 mg  0 5 mg Intramuscular Q6H PRN Max 4/day   • haloperidol lactate (HALDOL) injection 5 mg  5 mg Intramuscular Q4H PRN Max 4/day    And   • LORazepam (ATIVAN) injection 2 mg  2 mg Intramuscular Q4H PRN Max 4/day    And   • benztropine (COGENTIN) injection 1 mg  1 mg Intramuscular Q4H PRN Max 4/day   • benztropine (COGENTIN) injection 1 mg  1 mg Intramuscular Q4H PRN Max 6/day   • benztropine (COGENTIN) tablet 1 mg  1 mg Oral Q4H PRN Max 6/day   • bisacodyl (DULCOLAX) rectal suppository 10 mg  10 mg Rectal Daily PRN   • cholecalciferol (VITAMIN D3) tablet 400 Units  400 Units Oral Daily   • cyanocobalamin (VITAMIN B-12) tablet 1,000 mcg  1,000 mcg Oral Daily   • hydrOXYzine HCL (ATARAX) tablet 50 mg  50 mg Oral Q6H PRN Max 4/day    Or   • diphenhydrAMINE (BENADRYL) injection 50 mg  50 mg Intramuscular Q6H PRN   • divalproex sodium (DEPAKOTE ER) 24 hr tablet 2,000 mg  2,000 mg Oral HS   • FLUoxetine (PROzac) capsule 40 mg  40 mg Oral Daily   • fluticasone (FLONASE) 50 mcg/act nasal spray 1 spray  1 spray Each Nare Daily   • folic acid (FOLVITE) tablet 1 mg  1 mg Oral Daily   • glycerin-hypromellose- (ARTIFICIAL TEARS) ophthalmic solution 1 drop  1 drop Both Eyes Q3H PRN   • guaiFENesin (MUCINEX) 12 hr tablet 600 mg  600 mg Oral Q12H FEDE   • haloperidol (HALDOL) tablet 10 mg  10 mg Oral BID   • haloperidol (HALDOL) tablet 2 mg  2 mg Oral Q4H PRN Max 6/day   • haloperidol (HALDOL) tablet 5 mg  5 mg Oral Q6H PRN Max 4/day   • haloperidol (HALDOL) tablet 5 mg  5 mg Oral Q4H PRN Max 4/day   • hydrOXYzine HCL (ATARAX) tablet 100 mg  100 mg Oral Q6H PRN Max 4/day    Or   • LORazepam (ATIVAN) injection 2 mg  2 mg Intramuscular Q6H PRN   • hydrOXYzine HCL (ATARAX) tablet 25 mg  25 mg Oral Q6H PRN Max 4/day   • lisinopril (ZESTRIL) tablet 20 mg  20 mg Oral Daily   • lurasidone (LATUDA) tablet 40 mg  40 mg Oral HS   • mirtazapine (REMERON) tablet 45 mg  45 mg Oral HS   • polyethylene glycol (MIRALAX) packet 17 g  17 g Oral Daily PRN   • prazosin (MINIPRESS) capsule 2 mg  2 mg Oral HS   • senna (SENOKOT) tablet 8 6 mg  1 tablet Oral BID   • senna-docusate sodium (SENOKOT S) 8 6-50 mg per tablet 1 tablet  1 tablet Oral Daily PRN   • traZODone (DESYREL) tablet 50 mg  50 mg Oral HS   • traZODone (DESYREL) tablet 50 mg  50 mg Oral HS PRN   • ziprasidone (GEODON) capsule 40 mg  40 mg Oral BID With Meals       Behavioral Health Medications:   all current active meds have been reviewed  Vitals:  Vitals:    12/14/22 0728   BP: 148/88   Pulse: 77   Resp: 18   Temp: (!) 97 4 °F (36 3 °C)   SpO2: 99%       Laboratory results:    I have personally reviewed all pertinent laboratory/tests results  Mental Status Evaluation:    Appearance:  older than stated age and overweight   Behavior:  cooperative   Speech:  normal pitch and normal volume   Mood:  anxious and depressed   Affect:  labile and mood-incongruent   Thought Process:  concrete, goal directed and logical   Thought Content:  normal   Perceptual Disturbances: Auditory hallucinations without commands   Risk Potential: Suicidal Ideations none  Homicidal Ideations none  Potential for Aggression No   Sensorium:  person, place and time/date   Memory:  recent and remote memory grossly intact   Consciousness:  alert and awake    Attention: attention span appeared shorter than expected for age   Insight:  limited   Judgment: limited   Gait/Station: normal gait/station   Motor Activity: no abnormal movements       Progress Toward Goals: Progressing    Recommended Treatment: Continue with pharmacotherapy, group therapy, milieu therapy and occupational therapy  1 Increase haldol to 10 mg BId  2  Taper off Latuda  3  Discontinue Seroquel    Risks, benefits and possible side effects of Medications:   Risks, benefits, alternatives, and possible side effects of patient's psychiatric medications were discussed with patient

## 2022-12-14 NOTE — PROGRESS NOTES
Attended group alongside other people on the unit, and participated in discussion around recovery-centered topic       12/13/22 1230   Activity/Group Checklist   Group Other (Comment)  (12:30 [employer], 3:00 PM Goals, 5:00 Reflective Music Groups)   Attendance Attended  (Attended all groups, left reflective music group before end)   Attendance Duration (min) Greater than 60  (> 150)   Interactions Interacted appropriately   Affect/Mood Appropriate   Goals Achieved Identified feelings; Able to engage in interactions; Able to listen to others; Other (Comment)  (Engaged with CPS and others in conversation around money management/budgeting, differences in personal convictions, and emotions and memories connected to music and lyrics )

## 2022-12-14 NOTE — NURSING NOTE
Leydi is pleasant upon approach, visualized out in the community and socializing with select peers and staff  Patient denies current SI/HI/VH, endorses intermittent AH  Leydi was able to complete ADLs this evening  Patient is attending scheduled groups with appropriate participation  Leydi was encouraged to seek staff with any questions and/or concerns, verbalized understanding

## 2022-12-14 NOTE — TREATMENT TEAM
12/14/22 1000   Activity/Group Checklist   Group Exercise  (meditation body scan)   Attendance Attended   Attendance Duration (min) 16-30   Interactions Interacted appropriately   Affect/Mood Appropriate;Calm   Goals Achieved Able to listen to others; Able to engage in interactions; Other (Comment)  (followed along body scan meditation and engaged in discussion after)

## 2022-12-14 NOTE — PLAN OF CARE
Problem: Ineffective Coping  Goal: Identifies healthy coping skills  Outcome: Progressing  Goal: Demonstrates healthy coping skills  Outcome: Progressing  Goal: Participates in unit activities  Description: Interventions:  - Provide therapeutic environment   - Provide required programming   - Redirect inappropriate behaviors   Outcome: Progressing     Problem: Risk for Self Injury/Neglect  Goal: Verbalize thoughts and feelings  Description: Interventions:  - Assess and re-assess patient's lethality and potential for self-injury  - Engage patient in 1:1 interactions, daily, for a minimum of 15 minutes  - Encourage patient to express feelings, fears, frustrations, hopes  - Establish rapport/trust with patient   Outcome: Progressing  Goal: Refrain from harming self  Description: Interventions:  - Monitor patient closely, per order  - Develop a trusting relationship  - Supervise medication ingestion, monitor effects and side effects   Outcome: Progressing  Goal: Complete daily ADLs, including personal hygiene independently, as able  Description: Interventions:  - Observe, teach, and assist patient with ADLS  - Monitor and promote a balance of rest/activity, with adequate nutrition and elimination  Outcome: Progressing     Problem: Depression  Goal: Verbalize thoughts and feelings  Description: Interventions:  - Assess and re-assess patient's level of risk   - Engage patient in 1:1 interactions, daily, for a minimum of 15 minutes   - Encourage patient to express feelings, fears, frustrations, hopes   Outcome: Progressing  Goal: Refrain from harming self  Description: Interventions:  - Monitor patient closely, per order   - Supervise medication ingestion, monitor effects and side effects   Outcome: Progressing  Goal: Refrain from isolation  Description: Interventions:  - Develop a trusting relationship   - Encourage socialization   Outcome: Progressing     Problem: Anxiety  Goal: Anxiety is at manageable level  Description: Interventions:  - Assess and monitor patient's anxiety level  - Monitor for signs and symptoms (heart palpitations, chest pain, shortness of breath, headaches, nausea, feeling jumpy, restlessness, irritable, apprehensive)  - Collaborate with interdisciplinary team and initiate plan and interventions as ordered  - [place] patient to unit/surroundings  - Explain treatment plan  - Encourage participation in care  - Encourage verbalization of concerns/fears  - Identify coping mechanisms  - Assist in developing anxiety-reducing skills  - Administer/offer alternative therapies  - Limit or eliminate stimulants  Outcome: Progressing     Problem: SAFETY ADULT  Goal: Patient will remain free of falls  Description: INTERVENTIONS:  - Educate patient/family on patient safety including physical limitations  - Instruct patient to call for assistance with activity   - Consult OT/PT to assist with strengthening/mobility   - Keep Call bell within reach  - Keep bed low and locked with side rails adjusted as appropriate  - Keep care items and personal belongings within reach  - Initiate and maintain comfort rounds  - Make Fall Risk Sign visible to staff  - Apply yellow socks and bracelet for high fall risk patients  - Consider moving patient to room near nurses station  Outcome: Progressing     Problem: Nutrition/Hydration-ADULT  Goal: Nutrient/Hydration intake appropriate for improving, restoring or maintaining nutritional needs  Description: Monitor and assess patient's nutrition/hydration status for malnutrition  Collaborate with interdisciplinary team and initiate plan and interventions as ordered  Monitor patient's weight and dietary intake as ordered or per policy  Utilize nutrition screening tool and intervene as necessary  Determine patient's food preferences and provide high-protein, high-caloric foods as appropriate       INTERVENTIONS:  - Monitor oral intake, urinary output, labs, and treatment plans  - Assess nutrition and hydration status and recommend course of action  - Evaluate amount of meals eaten  - Assist patient with eating if necessary   - Allow adequate time for meals  - Recommend/ encourage appropriate diets, oral nutritional supplements, and vitamin/mineral supplements  - Order, calculate, and assess calorie counts as needed  - Recommend, monitor, and adjust tube feedings and TPN/PPN based on assessed needs  - Assess need for intravenous fluids  - Provide specific nutrition/hydration education as appropriate  - Include patient/family/caregiver in decisions related to nutrition  Outcome: Progressing

## 2022-12-14 NOTE — NURSING NOTE
Pt is visible on unit throughout the day and attending groups  Reports she is "not good" when asked and states "the hallucinations are really bad today "  Pt does not appear preoccupied or to RIS  Appears comfortable on unit during interactions  Denies SI and feels safe on unit

## 2022-12-14 NOTE — QUICK NOTE
Patient had the following dropped off:     Dress (ties; not appropriate for unit)  Skirt  tshirt x3  Dress  Pants  3x underwear

## 2022-12-14 NOTE — TREATMENT TEAM
12/13/22 1330   Activity/Group Checklist   Group Other (Comment)  (art therapy)   Attendance Attended   Attendance Duration (min) 46-60   Interactions Interacted appropriately   Affect/Mood Appropriate   Goals Achieved Able to engage in interactions; Able to listen to others; Other (Comment)  (spontaneous and authentic self expression, connection and insight)

## 2022-12-15 LAB
ATRIAL RATE: 79 BPM
P AXIS: 46 DEGREES
PR INTERVAL: 220 MS
QRS AXIS: 35 DEGREES
QRSD INTERVAL: 82 MS
QT INTERVAL: 372 MS
QTC INTERVAL: 426 MS
T WAVE AXIS: 56 DEGREES
VENTRICULAR RATE: 79 BPM

## 2022-12-15 RX ADMIN — CHOLECALCIFEROL (VITAMIN D3) 10 MCG (400 UNIT) TABLET 400 UNITS: at 08:58

## 2022-12-15 RX ADMIN — LISINOPRIL 20 MG: 20 TABLET ORAL at 08:58

## 2022-12-15 RX ADMIN — ZIPRASIDONE HCL 40 MG: 40 CAPSULE ORAL at 08:58

## 2022-12-15 RX ADMIN — TRAZODONE HYDROCHLORIDE 50 MG: 50 TABLET ORAL at 21:20

## 2022-12-15 RX ADMIN — HALOPERIDOL 10 MG: 10 TABLET ORAL at 08:58

## 2022-12-15 RX ADMIN — ATORVASTATIN CALCIUM 40 MG: 40 TABLET, FILM COATED ORAL at 16:24

## 2022-12-15 RX ADMIN — ASPIRIN 81 MG CHEWABLE TABLET 81 MG: 81 TABLET CHEWABLE at 08:58

## 2022-12-15 RX ADMIN — GUAIFENESIN 600 MG: 600 TABLET ORAL at 21:20

## 2022-12-15 RX ADMIN — GUAIFENESIN 600 MG: 600 TABLET ORAL at 08:58

## 2022-12-15 RX ADMIN — FLUTICASONE PROPIONATE 1 SPRAY: 50 SPRAY, METERED NASAL at 08:58

## 2022-12-15 RX ADMIN — FOLIC ACID 1 MG: 1 TABLET ORAL at 08:58

## 2022-12-15 RX ADMIN — ZIPRASIDONE HCL 40 MG: 40 CAPSULE ORAL at 16:24

## 2022-12-15 RX ADMIN — MIRTAZAPINE 45 MG: 15 TABLET, FILM COATED ORAL at 21:19

## 2022-12-15 RX ADMIN — FLUOXETINE 40 MG: 20 CAPSULE ORAL at 08:58

## 2022-12-15 RX ADMIN — PRAZOSIN HYDROCHLORIDE 2 MG: 1 CAPSULE ORAL at 21:20

## 2022-12-15 RX ADMIN — HALOPERIDOL 10 MG: 10 TABLET ORAL at 17:37

## 2022-12-15 RX ADMIN — CYANOCOBALAMIN TAB 500 MCG 1000 MCG: 500 TAB at 08:58

## 2022-12-15 RX ADMIN — DIVALPROEX SODIUM 2000 MG: 500 TABLET, EXTENDED RELEASE ORAL at 21:19

## 2022-12-15 NOTE — PROGRESS NOTES
Status: Pt remains pleasant, cooperative, & social on the unit  She attends groups & appeared to have slept overnight without issue     Medication: Haldol increased to 10mg BID & Latuda decreased to 40mg HS & Seroquel discontinued / PRN - Tylenol  D/C: Tuesday /      12/15/22 0830   Team Meeting   Meeting Type Daily Rounds   Team Members Present   Team Members Present Physician;Nurse;   Physician Team Member Dr Chente Geller / Derek Smith Team Member [facility] Management Team Member Linh Sharp / Fabio Garcia   Patient/Family Present   Patient Present No   Patient's Family Present No

## 2022-12-15 NOTE — CASE MANAGEMENT
ROSE received a call from Ace Marion's residential care coordinator, who said that staff did not get to bring Pt's clothing yesterday, but someone would bring it today  ROSE met with Pt who was happy to hear she would have clothing brought today

## 2022-12-15 NOTE — PLAN OF CARE
Problem: Ineffective Coping  Goal: Identifies healthy coping skills  Outcome: Progressing  Goal: Demonstrates healthy coping skills  Outcome: Progressing  Goal: Participates in unit activities  Description: Interventions:  - Provide therapeutic environment   - Provide required programming   - Redirect inappropriate behaviors   Outcome: Progressing     Problem: Risk for Self Injury/Neglect  Goal: Verbalize thoughts and feelings  Description: Interventions:  - Assess and re-assess patient's lethality and potential for self-injury  - Engage patient in 1:1 interactions, daily, for a minimum of 15 minutes  - Encourage patient to express feelings, fears, frustrations, hopes  - Establish rapport/trust with patient   Outcome: Progressing  Goal: Refrain from harming self  Description: Interventions:  - Monitor patient closely, per order  - Develop a trusting relationship  - Supervise medication ingestion, monitor effects and side effects   Outcome: Progressing  Goal: Complete daily ADLs, including personal hygiene independently, as able  Description: Interventions:  - Observe, teach, and assist patient with ADLS  - Monitor and promote a balance of rest/activity, with adequate nutrition and elimination  Outcome: Progressing     Problem: Depression  Goal: Verbalize thoughts and feelings  Description: Interventions:  - Assess and re-assess patient's level of risk   - Engage patient in 1:1 interactions, daily, for a minimum of 15 minutes   - Encourage patient to express feelings, fears, frustrations, hopes   Outcome: Progressing  Goal: Refrain from harming self  Description: Interventions:  - Monitor patient closely, per order   - Supervise medication ingestion, monitor effects and side effects   Outcome: Progressing  Goal: Refrain from isolation  Description: Interventions:  - Develop a trusting relationship   - Encourage socialization   Outcome: Progressing     Problem: Anxiety  Goal: Anxiety is at manageable level  Description: Interventions:  - Assess and monitor patient's anxiety level  - Monitor for signs and symptoms (heart palpitations, chest pain, shortness of breath, headaches, nausea, feeling jumpy, restlessness, irritable, apprehensive)  - Collaborate with interdisciplinary team and initiate plan and interventions as ordered  - [place] patient to unit/surroundings  - Explain treatment plan  - Encourage participation in care  - Encourage verbalization of concerns/fears  - Identify coping mechanisms  - Assist in developing anxiety-reducing skills  - Administer/offer alternative therapies  - Limit or eliminate stimulants  Outcome: Progressing     Problem: SAFETY ADULT  Goal: Patient will remain free of falls  Description: INTERVENTIONS:  - Educate patient/family on patient safety including physical limitations  - Instruct patient to call for assistance with activity   - Consult OT/PT to assist with strengthening/mobility   - Keep Call bell within reach  - Keep bed low and locked with side rails adjusted as appropriate  - Keep care items and personal belongings within reach  - Initiate and maintain comfort rounds  - Make Fall Risk Sign visible to staff  - Apply yellow socks and bracelet for high fall risk patients  - Consider moving patient to room near nurses station  Outcome: Progressing     Problem: Nutrition/Hydration-ADULT  Goal: Nutrient/Hydration intake appropriate for improving, restoring or maintaining nutritional needs  Description: Monitor and assess patient's nutrition/hydration status for malnutrition  Collaborate with interdisciplinary team and initiate plan and interventions as ordered  Monitor patient's weight and dietary intake as ordered or per policy  Utilize nutrition screening tool and intervene as necessary  Determine patient's food preferences and provide high-protein, high-caloric foods as appropriate       INTERVENTIONS:  - Monitor oral intake, urinary output, labs, and treatment plans  - Assess nutrition and hydration status and recommend course of action  - Evaluate amount of meals eaten  - Assist patient with eating if necessary   - Allow adequate time for meals  - Recommend/ encourage appropriate diets, oral nutritional supplements, and vitamin/mineral supplements  - Order, calculate, and assess calorie counts as needed  - Recommend, monitor, and adjust tube feedings and TPN/PPN based on assessed needs  - Assess need for intravenous fluids  - Provide specific nutrition/hydration education as appropriate  - Include patient/family/caregiver in decisions related to nutrition  Outcome: Progressing

## 2022-12-15 NOTE — PROGRESS NOTES
Treatment Plan Meeting:  Diagnosis of Major depression with psychotic features, PTSD, & Borderline Personality Disorder reviewed  Discussed short term goals for decrease in depressive symptoms, decrease in anxiety symptoms, decrease in paranoid thoughts, decrease in psychotic symptoms, decrease in suicidal thoughts, decrease in self abusive behaviors, decrease in level of agitation, improvement in insight, improvement in self care, sleep improvement, & improvement in appetite  Discharge is tentative for Tuesday, with Pt returning to her [facility] [place]  [employer]  All parties in agreement & treatment plan was signed       12/14/22 0825   Team Meeting   Meeting Type Tx Team Meeting   Initial Conference Date 12/14/22   Next Conference Date 01/09/23   Team Members Present   Team Members Present Physician;Nurse;   Physician Team Member Dr Ana Thorpe Team Member DANDRE [facility] Management Team Member [place]   Patient/Family Present   Patient Present Yes   Patient's Family Present No

## 2022-12-15 NOTE — NURSING NOTE
Pt sleeping for large portion of the day, OOB for meds and meals (skipped lunch)  Reports not sleeping well and "not having a good day"   Scant with writer, steve SI

## 2022-12-15 NOTE — TREATMENT TEAM
12/14/22 1330   Activity/Group Checklist   Group Other (Comment)  (art therapy)   Attendance Attended   Attendance Duration (min) 46-60   Interactions Interacted appropriately   Affect/Mood Appropriate   Goals Achieved Able to engage in interactions; Able to listen to others; Other (Comment)  (authentic, spontaneous self expression, connection, and insight)

## 2022-12-15 NOTE — PROGRESS NOTES
Status: Pt denies any SI & attends groups & is pleasant & social with her peers  Attending physician was able to talk to Pt's outpatient psychiatrist regarding multiple antipsychotic medications & plans to discontinue some of them  Medication: Aspirin, Lipitor, Vitamin D3, [id number], Prozac, Folic Acid, Mucinex, Haldol, Zesteril, & Prazosin started / PRN - Tylenol    D/C: Tuesday(?) / CM will follow back up with care coordinator who was making arrangements for staff to bring Pt clothing & underwear, but they weren't delivered yesterday  12/14/22 0750   Team Meeting   Meeting Type Daily Rounds   Team Members Present   Team Members Present Physician;Nurse; Other (Discipline and Name);    Physician Team Member Dr Abhijit Jessica / Rosie Gusman / Noni Aj Team Member DANDRE [facility] Management Team Member Sarwat Ontiveros / [place]ton [place]   Other (Discipline and Name) Cuco(art therapy)   Patient/Family Present   Patient Present No   Patient's Family Present No

## 2022-12-15 NOTE — TREATMENT TEAM
12/14/22 1500   Activity/Group Checklist   Group Other (Comment)  (goals group)   Attendance Attended   Attendance Duration (min) 16-30   Interactions Interacted appropriately   Affect/Mood Appropriate   Goals Achieved Able to listen to others; Able to engage in interactions; Other (Comment)  (reviewed goals)

## 2022-12-15 NOTE — NURSING NOTE
Pt awake for short period of time, moved from bedroom to dayroom  Scant in conversation, "feeling alright" this afternoon  Denies SI/HI/AVH  Currently sleeping in dayroom

## 2022-12-15 NOTE — CASE MANAGEMENT
ROSE received a phone call from Pt's residential care coordinator, Sanam  She reported that on Friday night the ER was calling & requesting staff come pick Pt up, as they weren't admitting her, however, due to the time, they didn't have enough staff on site for someone to leave  Sanam said that she call the ER Saturday morning to make arrangements to come pick Pt up & they told her that she was going to see the psychiatrist & be admitted  Sanam said that Pt had been doing okay, still calls 911 & has been taken to the ER multiple times since her last admission at [facility] in August, however, she had been discharged back home each time  ROSE provided an update on how Pt is doing on the unit, but that the attending physician does plan to outreach to Pt's outpatient psychiatrist to discuss her medications, as she is on so many antipsychotics  ROSE reviewed that d/c is tentative for Tues at this time  ROSE reviewed that Pt is asking for staff to bring her clothing, particularly underwear  Sanam said that she can have staff pack a bag & bring it over today

## 2022-12-15 NOTE — TREATMENT TEAM
12/14/22 1230   Activity/Group Checklist   Group Life Skills   Attendance Attended   Attendance Duration (min) 16-30   Interactions Interacted appropriately   Affect/Mood Appropriate   Goals Achieved Able to engage in interactions; Able to listen to others; Identified feelings

## 2022-12-15 NOTE — PROGRESS NOTES
Progress Note - Behavioral Health   Leydi Hancock 47 y o  female MRN: [id number]  Unit/Bed#: [facility] 256-01 Encounter: [id number]    Assessment/Plan   Principal Problem:    Major depression with psychotic features ([facility] )  Active Problems:    Primary hypertension    Hyperlipidemia    Class 3 severe obesity due to excess calories without serious comorbidity in [facility])    Chronic midline low back pain without sciatica    PTSD (post-traumatic stress disorder)    Borderline personality disorder ([facility] )    Medical clearance for psychiatric admission    Seizures ([facility] )      Subjective: Patient was seen, chart was reviewed, and case was discussed with the team  Patient appears calm, cooperative and visible in groups  She continues to endorse depressed mood and negative derogatory voices  Denies any commands  Suicidal ideations have diminished  Sleep and appetite are improved  She is compliant with medications  Denies any side effects     Behavior over the last 24 hours:  unchanged  Sleep: normal  Appetite: normal  Medication side effects: No    Medical ROS: Pertinent items are noted in HPI all other systems are negative    Current Medications:  Current Facility-Administered Medications   Medication Dose Route Frequency   • acetaminophen (TYLENOL) tablet 650 mg  650 mg Oral Q6H PRN   • acetaminophen (TYLENOL) tablet 650 mg  650 mg Oral Q4H PRN   • acetaminophen (TYLENOL) tablet 975 mg  975 mg Oral Q6H PRN   • aluminum-magnesium hydroxide-simethicone (MYLANTA) oral suspension 30 mL  30 mL Oral Q4H PRN   • aspirin chewable tablet 81 mg  81 mg Oral Daily   • atorvastatin (LIPITOR) tablet 40 mg  40 mg Oral Daily With Dinner   • benzonatate (TESSALON PERLES) capsule 100 mg  100 mg Oral TID PRN   • haloperidol lactate (HALDOL) injection 2 5 mg  2 5 mg Intramuscular Q6H PRN Max 4/day    And   • LORazepam (ATIVAN) injection 1 mg  1 mg Intramuscular Q6H PRN Max 4/day    And   • benztropine (COGENTIN) injection 0 5 mg  0 5 mg Intramuscular Q6H PRN Max 4/day   • haloperidol lactate (HALDOL) injection 5 mg  5 mg Intramuscular Q4H PRN Max 4/day    And   • LORazepam (ATIVAN) injection 2 mg  2 mg Intramuscular Q4H PRN Max 4/day    And   • benztropine (COGENTIN) injection 1 mg  1 mg Intramuscular Q4H PRN Max 4/day   • benztropine (COGENTIN) injection 1 mg  1 mg Intramuscular Q4H PRN Max 6/day   • benztropine (COGENTIN) tablet 1 mg  1 mg Oral Q4H PRN Max 6/day   • bisacodyl (DULCOLAX) rectal suppository 10 mg  10 mg Rectal Daily PRN   • cholecalciferol (VITAMIN D3) tablet 400 Units  400 Units Oral Daily   • cyanocobalamin (VITAMIN B-12) tablet 1,000 mcg  1,000 mcg Oral Daily   • hydrOXYzine HCL (ATARAX) tablet 50 mg  50 mg Oral Q6H PRN Max 4/day    Or   • diphenhydrAMINE (BENADRYL) injection 50 mg  50 mg Intramuscular Q6H PRN   • divalproex sodium (DEPAKOTE ER) 24 hr tablet 2,000 mg  2,000 mg Oral HS   • FLUoxetine (PROzac) capsule 40 mg  40 mg Oral Daily   • fluticasone (FLONASE) 50 mcg/act nasal spray 1 spray  1 spray Each Nare Daily   • folic acid (FOLVITE) tablet 1 mg  1 mg Oral Daily   • glycerin-hypromellose- (ARTIFICIAL TEARS) ophthalmic solution 1 drop  1 drop Both Eyes Q3H PRN   • guaiFENesin (MUCINEX) 12 hr tablet 600 mg  600 mg Oral Q12H FEDE   • haloperidol (HALDOL) tablet 10 mg  10 mg Oral BID   • haloperidol (HALDOL) tablet 2 mg  2 mg Oral Q4H PRN Max 6/day   • haloperidol (HALDOL) tablet 5 mg  5 mg Oral Q6H PRN Max 4/day   • haloperidol (HALDOL) tablet 5 mg  5 mg Oral Q4H PRN Max 4/day   • hydrOXYzine HCL (ATARAX) tablet 100 mg  100 mg Oral Q6H PRN Max 4/day    Or   • LORazepam (ATIVAN) injection 2 mg  2 mg Intramuscular Q6H PRN   • hydrOXYzine HCL (ATARAX) tablet 25 mg  25 mg Oral Q6H PRN Max 4/day   • lisinopril (ZESTRIL) tablet 20 mg  20 mg Oral Daily   • mirtazapine (REMERON) tablet 45 mg  45 mg Oral HS   • polyethylene glycol (MIRALAX) packet 17 g  17 g Oral Daily PRN   • prazosin (MINIPRESS) capsule 2 mg  2 mg Oral HS   • senna (SENOKOT) tablet 8 6 mg  1 tablet Oral BID   • senna-docusate sodium (SENOKOT S) 8 6-50 mg per tablet 1 tablet  1 tablet Oral Daily PRN   • traZODone (DESYREL) tablet 50 mg  50 mg Oral HS   • traZODone (DESYREL) tablet 50 mg  50 mg Oral HS PRN   • ziprasidone (GEODON) capsule 40 mg  40 mg Oral BID With Meals       Behavioral Health Medications:   all current active meds have been reviewed  Vitals:  Vitals:    12/15/22 0725   BP: 119/78   Pulse: 87   Resp: 18   Temp: (!) 96 8 °F (36 °C)   SpO2: 97%       Laboratory results:    I have personally reviewed all pertinent laboratory/tests results  Mental Status Evaluation:    Appearance:  disheveled, older than stated age and overweight   Behavior:  guarded   Speech:  soft   Mood:  anxious and depressed   Affect:  constricted and flat   Thought Process:  concrete and goal directed   Thought Content:  normal   Perceptual Disturbances: Auditory hallucinations without commands   Risk Potential: Suicidal Ideations none  Homicidal Ideations none  Potential for Aggression No   Sensorium:  person, place and time/date   Memory:  recent and remote memory grossly intact   Consciousness:  alert and awake    Attention: attention span appeared shorter than expected for age   Insight:  limited   Judgment: limited   Gait/Station: normal gait/station   Motor Activity: no abnormal movements       Progress Toward Goals: Progressing    Recommended Treatment: Continue with pharmacotherapy, group therapy, milieu therapy and occupational therapy  Discontinue Latuda  Risks, benefits and possible side effects of Medications:   Risks, benefits, alternatives, and possible side effects of patient's psychiatric medications were discussed with patient

## 2022-12-16 RX ORDER — TRAZODONE HYDROCHLORIDE 100 MG/1
100 TABLET ORAL
Status: DISCONTINUED | OUTPATIENT
Start: 2022-12-16 | End: 2022-12-20 | Stop reason: HOSPADM

## 2022-12-16 RX ADMIN — MIRTAZAPINE 45 MG: 15 TABLET, FILM COATED ORAL at 21:30

## 2022-12-16 RX ADMIN — GUAIFENESIN 600 MG: 600 TABLET ORAL at 21:30

## 2022-12-16 RX ADMIN — TRAZODONE HYDROCHLORIDE 100 MG: 100 TABLET ORAL at 21:30

## 2022-12-16 RX ADMIN — CYANOCOBALAMIN TAB 500 MCG 1000 MCG: 500 TAB at 08:49

## 2022-12-16 RX ADMIN — ASPIRIN 81 MG CHEWABLE TABLET 81 MG: 81 TABLET CHEWABLE at 08:49

## 2022-12-16 RX ADMIN — HALOPERIDOL 10 MG: 10 TABLET ORAL at 18:21

## 2022-12-16 RX ADMIN — ZIPRASIDONE HCL 40 MG: 40 CAPSULE ORAL at 17:03

## 2022-12-16 RX ADMIN — FLUOXETINE 40 MG: 20 CAPSULE ORAL at 08:49

## 2022-12-16 RX ADMIN — ATORVASTATIN CALCIUM 40 MG: 40 TABLET, FILM COATED ORAL at 17:03

## 2022-12-16 RX ADMIN — CHOLECALCIFEROL (VITAMIN D3) 10 MCG (400 UNIT) TABLET 400 UNITS: at 08:49

## 2022-12-16 RX ADMIN — SENNOSIDES 8.6 MG: 8.6 TABLET, FILM COATED ORAL at 18:21

## 2022-12-16 RX ADMIN — LISINOPRIL 20 MG: 20 TABLET ORAL at 08:49

## 2022-12-16 RX ADMIN — GUAIFENESIN 600 MG: 600 TABLET ORAL at 08:49

## 2022-12-16 RX ADMIN — ZIPRASIDONE HCL 40 MG: 40 CAPSULE ORAL at 08:38

## 2022-12-16 RX ADMIN — PRAZOSIN HYDROCHLORIDE 2 MG: 1 CAPSULE ORAL at 21:30

## 2022-12-16 RX ADMIN — FLUTICASONE PROPIONATE 1 SPRAY: 50 SPRAY, METERED NASAL at 08:52

## 2022-12-16 RX ADMIN — HALOPERIDOL 10 MG: 10 TABLET ORAL at 08:49

## 2022-12-16 RX ADMIN — DIVALPROEX SODIUM 2000 MG: 500 TABLET, EXTENDED RELEASE ORAL at 21:30

## 2022-12-16 RX ADMIN — FOLIC ACID 1 MG: 1 TABLET ORAL at 08:49

## 2022-12-16 NOTE — PLAN OF CARE
Problem: Ineffective Coping  Goal: Identifies healthy coping skills  Outcome: Progressing  Goal: Demonstrates healthy coping skills  Outcome: Progressing  Goal: Participates in unit activities  Description: Interventions:  - Provide therapeutic environment   - Provide required programming   - Redirect inappropriate behaviors   Outcome: Progressing     Problem: Risk for Self Injury/Neglect  Goal: Verbalize thoughts and feelings  Description: Interventions:  - Assess and re-assess patient's lethality and potential for self-injury  - Engage patient in 1:1 interactions, daily, for a minimum of 15 minutes  - Encourage patient to express feelings, fears, frustrations, hopes  - Establish rapport/trust with patient   Outcome: Progressing  Goal: Refrain from harming self  Description: Interventions:  - Monitor patient closely, per order  - Develop a trusting relationship  - Supervise medication ingestion, monitor effects and side effects   Outcome: Progressing  Goal: Complete daily ADLs, including personal hygiene independently, as able  Description: Interventions:  - Observe, teach, and assist patient with ADLS  - Monitor and promote a balance of rest/activity, with adequate nutrition and elimination  Outcome: Progressing     Problem: Depression  Goal: Verbalize thoughts and feelings  Description: Interventions:  - Assess and re-assess patient's level of risk   - Engage patient in 1:1 interactions, daily, for a minimum of 15 minutes   - Encourage patient to express feelings, fears, frustrations, hopes   Outcome: Progressing  Goal: Refrain from harming self  Description: Interventions:  - Monitor patient closely, per order   - Supervise medication ingestion, monitor effects and side effects   Outcome: Progressing  Goal: Refrain from isolation  Description: Interventions:  - Develop a trusting relationship   - Encourage socialization   Outcome: Progressing     Problem: Anxiety  Goal: Anxiety is at manageable level  Description: Interventions:  - Assess and monitor patient's anxiety level  - Monitor for signs and symptoms (heart palpitations, chest pain, shortness of breath, headaches, nausea, feeling jumpy, restlessness, irritable, apprehensive)  - Collaborate with interdisciplinary team and initiate plan and interventions as ordered  - [place] patient to unit/surroundings  - Explain treatment plan  - Encourage participation in care  - Encourage verbalization of concerns/fears  - Identify coping mechanisms  - Assist in developing anxiety-reducing skills  - Administer/offer alternative therapies  - Limit or eliminate stimulants  Outcome: Progressing     Problem: SAFETY ADULT  Goal: Patient will remain free of falls  Description: INTERVENTIONS:  - Educate patient/family on patient safety including physical limitations  - Instruct patient to call for assistance with activity   - Consult OT/PT to assist with strengthening/mobility   - Keep Call bell within reach  - Keep bed low and locked with side rails adjusted as appropriate  - Keep care items and personal belongings within reach  - Initiate and maintain comfort rounds  - Make Fall Risk Sign visible to staff  - Apply yellow socks and bracelet for high fall risk patients  - Consider moving patient to room near nurses station  Outcome: Progressing     Problem: Nutrition/Hydration-ADULT  Goal: Nutrient/Hydration intake appropriate for improving, restoring or maintaining nutritional needs  Description: Monitor and assess patient's nutrition/hydration status for malnutrition  Collaborate with interdisciplinary team and initiate plan and interventions as ordered  Monitor patient's weight and dietary intake as ordered or per policy  Utilize nutrition screening tool and intervene as necessary  Determine patient's food preferences and provide high-protein, high-caloric foods as appropriate       INTERVENTIONS:  - Monitor oral intake, urinary output, labs, and treatment plans  - Assess nutrition and hydration status and recommend course of action  - Evaluate amount of meals eaten  - Assist patient with eating if necessary   - Allow adequate time for meals  - Recommend/ encourage appropriate diets, oral nutritional supplements, and vitamin/mineral supplements  - Order, calculate, and assess calorie counts as needed  - Recommend, monitor, and adjust tube feedings and TPN/PPN based on assessed needs  - Assess need for intravenous fluids  - Provide specific nutrition/hydration education as appropriate  - Include patient/family/caregiver in decisions related to nutrition  Outcome: Progressing

## 2022-12-16 NOTE — PROGRESS NOTES
Status:   Medication:  D/C: Tuesday /      12/16/22 0750   Team Meeting   Meeting Type Daily Rounds   Team Members Present   Team Members Present Physician;Nurse; Other (Discipline and Name);    Physician Team Member Dr Destiny Escobar / Charles Alas Team Member Wesley Garcia / Kelly Bray Management Team Member Omaira Plata / Zoe Tripathi   Other (Discipline and Name) Cuco(art therapy)   Patient/Family Present   Patient Present No   Patient's Family Present No

## 2022-12-16 NOTE — NURSING NOTE
Pt reports feeling tired this morning and napping after breakfast   OOB following lunch and sitting in tv room  Plans to attend afternoon groups  Continues to report AH however does not appears preoccupied or RIS

## 2022-12-16 NOTE — TREATMENT TEAM
12/16/22 1230   Activity/Group Checklist   Group Life Skills  (self esteem beverly)   Attendance Attended   Attendance Duration (min) 16-30   Interactions Interacted appropriately   Affect/Mood Appropriate   Goals Achieved Identified feelings; Able to listen to others; Able to engage in interactions; Other (Comment)  (engaged in discussion when asked questions throughout the game)

## 2022-12-16 NOTE — NURSING NOTE
Pt visible in dayroom throughout shift  Brighter affect today, appears to be less depressed even though she is still reporting that she is "not good"  Playing cards games with peers   Denies SI

## 2022-12-16 NOTE — PROGRESS NOTES
Progress Note - Behavioral Health   Leydi Saba 47 y o  female MRN: [id number]  Unit/Bed#: [facility] 256-01 Encounter: [id number]    Assessment/Plan   Principal Problem:    Major depression with psychotic features ([facility] )  Active Problems:    Primary hypertension    Hyperlipidemia    Class 3 severe obesity due to excess calories without serious comorbidity in [facility])    Chronic midline low back pain without sciatica    PTSD (post-traumatic stress disorder)    Borderline personality disorder ([facility] )    Medical clearance for psychiatric admission    Seizures ([facility] )      Subjective: Patient was seen, chart was reviewed, and case was discussed with the team  As per report patient was in bed most of the time yesterday  Today she was seen in day room watching TV  Patient continues to endorse depressed mood and negative voices  Denies any commands  She is compliant with medications  Denies any side effects     Behavior over the last 24 hours:  unchanged  Sleep: Poor  Appetite: normal  Medication side effects: No    Medical ROS: Pertinent items are noted in HPI all other systems are negative    Current Medications:  Current Facility-Administered Medications   Medication Dose Route Frequency   • acetaminophen (TYLENOL) tablet 650 mg  650 mg Oral Q6H PRN   • acetaminophen (TYLENOL) tablet 650 mg  650 mg Oral Q4H PRN   • acetaminophen (TYLENOL) tablet 975 mg  975 mg Oral Q6H PRN   • aluminum-magnesium hydroxide-simethicone (MYLANTA) oral suspension 30 mL  30 mL Oral Q4H PRN   • aspirin chewable tablet 81 mg  81 mg Oral Daily   • atorvastatin (LIPITOR) tablet 40 mg  40 mg Oral Daily With Dinner   • benzonatate (TESSALON PERLES) capsule 100 mg  100 mg Oral TID PRN   • haloperidol lactate (HALDOL) injection 2 5 mg  2 5 mg Intramuscular Q6H PRN Max 4/day    And   • LORazepam (ATIVAN) injection 1 mg  1 mg Intramuscular Q6H PRN Max 4/day    And   • benztropine (COGENTIN) injection 0 5 mg  0 5 mg Intramuscular Q6H PRN Max 4/day   • haloperidol lactate (HALDOL) injection 5 mg  5 mg Intramuscular Q4H PRN Max 4/day    And   • LORazepam (ATIVAN) injection 2 mg  2 mg Intramuscular Q4H PRN Max 4/day    And   • benztropine (COGENTIN) injection 1 mg  1 mg Intramuscular Q4H PRN Max 4/day   • benztropine (COGENTIN) injection 1 mg  1 mg Intramuscular Q4H PRN Max 6/day   • benztropine (COGENTIN) tablet 1 mg  1 mg Oral Q4H PRN Max 6/day   • bisacodyl (DULCOLAX) rectal suppository 10 mg  10 mg Rectal Daily PRN   • cholecalciferol (VITAMIN D3) tablet 400 Units  400 Units Oral Daily   • cyanocobalamin (VITAMIN B-12) tablet 1,000 mcg  1,000 mcg Oral Daily   • hydrOXYzine HCL (ATARAX) tablet 50 mg  50 mg Oral Q6H PRN Max 4/day    Or   • diphenhydrAMINE (BENADRYL) injection 50 mg  50 mg Intramuscular Q6H PRN   • divalproex sodium (DEPAKOTE ER) 24 hr tablet 2,000 mg  2,000 mg Oral HS   • FLUoxetine (PROzac) capsule 40 mg  40 mg Oral Daily   • fluticasone (FLONASE) 50 mcg/act nasal spray 1 spray  1 spray Each Nare Daily   • folic acid (FOLVITE) tablet 1 mg  1 mg Oral Daily   • glycerin-hypromellose- (ARTIFICIAL TEARS) ophthalmic solution 1 drop  1 drop Both Eyes Q3H PRN   • guaiFENesin (MUCINEX) 12 hr tablet 600 mg  600 mg Oral Q12H FEDE   • haloperidol (HALDOL) tablet 10 mg  10 mg Oral BID   • haloperidol (HALDOL) tablet 2 mg  2 mg Oral Q4H PRN Max 6/day   • haloperidol (HALDOL) tablet 5 mg  5 mg Oral Q6H PRN Max 4/day   • haloperidol (HALDOL) tablet 5 mg  5 mg Oral Q4H PRN Max 4/day   • hydrOXYzine HCL (ATARAX) tablet 100 mg  100 mg Oral Q6H PRN Max 4/day    Or   • LORazepam (ATIVAN) injection 2 mg  2 mg Intramuscular Q6H PRN   • hydrOXYzine HCL (ATARAX) tablet 25 mg  25 mg Oral Q6H PRN Max 4/day   • lisinopril (ZESTRIL) tablet 20 mg  20 mg Oral Daily   • mirtazapine (REMERON) tablet 45 mg  45 mg Oral HS   • polyethylene glycol (MIRALAX) packet 17 g  17 g Oral Daily PRN   • prazosin (MINIPRESS) capsule 2 mg  2 mg Oral HS   • senna (SENOKOT) tablet 8 6 mg 1 tablet Oral BID   • senna-docusate sodium (SENOKOT S) 8 6-50 mg per tablet 1 tablet  1 tablet Oral Daily PRN   • traZODone (DESYREL) tablet 50 mg  50 mg Oral HS   • traZODone (DESYREL) tablet 50 mg  50 mg Oral HS PRN   • ziprasidone (GEODON) capsule 40 mg  40 mg Oral BID With Meals       Behavioral Health Medications:   all current active meds have been reviewed  Vitals:  Vitals:    12/16/22 0800   BP: 138/86   Pulse: 82   Resp: 18   Temp: (!) 96 8 °F (36 °C)   SpO2:        Laboratory results:    I have personally reviewed all pertinent laboratory/tests results  Mental Status Evaluation:    Appearance:  disheveled and overweight   Behavior:  cooperative   Speech:  soft   Mood:  anxious and depressed   Affect:  constricted and mood-congruent   Thought Process:  concrete, goal directed and logical   Thought Content:  normal   Perceptual Disturbances: Auditory hallucinations without commands   Risk Potential: Suicidal Ideations none  Homicidal Ideations none  Potential for Aggression No   Sensorium:  person, place and time/date   Memory:  recent and remote memory grossly intact   Consciousness:  alert and awake    Attention: attention span appeared shorter than expected for age   Insight:  limited   Judgment: limited   Gait/Station: normal gait/station   Motor Activity: no abnormal movements       Progress Toward Goals: Progressing    Recommended Treatment: Continue with pharmacotherapy, group therapy, milieu therapy and occupational therapy  1 Increase Trazodone to 100 mg PO HS  Risks, benefits and possible side effects of Medications:   Risks, benefits, alternatives, and possible side effects of patient's psychiatric medications were discussed with patient

## 2022-12-17 RX ADMIN — CYANOCOBALAMIN TAB 500 MCG 1000 MCG: 500 TAB at 10:15

## 2022-12-17 RX ADMIN — PRAZOSIN HYDROCHLORIDE 2 MG: 1 CAPSULE ORAL at 21:25

## 2022-12-17 RX ADMIN — ZIPRASIDONE HCL 40 MG: 40 CAPSULE ORAL at 10:15

## 2022-12-17 RX ADMIN — ASPIRIN 81 MG CHEWABLE TABLET 81 MG: 81 TABLET CHEWABLE at 10:15

## 2022-12-17 RX ADMIN — SENNOSIDES 8.6 MG: 8.6 TABLET, FILM COATED ORAL at 18:56

## 2022-12-17 RX ADMIN — ZIPRASIDONE HCL 40 MG: 40 CAPSULE ORAL at 16:53

## 2022-12-17 RX ADMIN — HALOPERIDOL 10 MG: 10 TABLET ORAL at 18:56

## 2022-12-17 RX ADMIN — FOLIC ACID 1 MG: 1 TABLET ORAL at 10:15

## 2022-12-17 RX ADMIN — SENNOSIDES 8.6 MG: 8.6 TABLET, FILM COATED ORAL at 10:15

## 2022-12-17 RX ADMIN — DIVALPROEX SODIUM 2000 MG: 500 TABLET, EXTENDED RELEASE ORAL at 21:25

## 2022-12-17 RX ADMIN — LISINOPRIL 20 MG: 20 TABLET ORAL at 10:15

## 2022-12-17 RX ADMIN — CHOLECALCIFEROL (VITAMIN D3) 10 MCG (400 UNIT) TABLET 400 UNITS: at 10:15

## 2022-12-17 RX ADMIN — GUAIFENESIN 600 MG: 600 TABLET ORAL at 10:15

## 2022-12-17 RX ADMIN — FLUOXETINE 40 MG: 20 CAPSULE ORAL at 10:15

## 2022-12-17 RX ADMIN — TRAZODONE HYDROCHLORIDE 100 MG: 100 TABLET ORAL at 21:26

## 2022-12-17 RX ADMIN — GUAIFENESIN 600 MG: 600 TABLET ORAL at 21:25

## 2022-12-17 RX ADMIN — ATORVASTATIN CALCIUM 40 MG: 40 TABLET, FILM COATED ORAL at 16:53

## 2022-12-17 RX ADMIN — HALOPERIDOL 10 MG: 10 TABLET ORAL at 10:15

## 2022-12-17 RX ADMIN — MIRTAZAPINE 45 MG: 15 TABLET, FILM COATED ORAL at 21:26

## 2022-12-17 RX ADMIN — POLYETHYLENE GLYCOL 3350 17 G: 17 POWDER, FOR SOLUTION ORAL at 20:23

## 2022-12-17 NOTE — PLAN OF CARE
Problem: Ineffective Coping  Goal: Identifies healthy coping skills  Outcome: Progressing  Goal: Demonstrates healthy coping skills  Outcome: Progressing  Goal: Participates in unit activities  Description: Interventions:  - Provide therapeutic environment   - Provide required programming   - Redirect inappropriate behaviors   Outcome: Progressing     Problem: Risk for Self Injury/Neglect  Goal: Verbalize thoughts and feelings  Description: Interventions:  - Assess and re-assess patient's lethality and potential for self-injury  - Engage patient in 1:1 interactions, daily, for a minimum of 15 minutes  - Encourage patient to express feelings, fears, frustrations, hopes  - Establish rapport/trust with patient   Outcome: Progressing  Goal: Refrain from harming self  Description: Interventions:  - Monitor patient closely, per order  - Develop a trusting relationship  - Supervise medication ingestion, monitor effects and side effects   Outcome: Progressing  Goal: Complete daily ADLs, including personal hygiene independently, as able  Description: Interventions:  - Observe, teach, and assist patient with ADLS  - Monitor and promote a balance of rest/activity, with adequate nutrition and elimination  Outcome: Progressing     Problem: Depression  Goal: Verbalize thoughts and feelings  Description: Interventions:  - Assess and re-assess patient's level of risk   - Engage patient in 1:1 interactions, daily, for a minimum of 15 minutes   - Encourage patient to express feelings, fears, frustrations, hopes   Outcome: Progressing  Goal: Refrain from harming self  Description: Interventions:  - Monitor patient closely, per order   - Supervise medication ingestion, monitor effects and side effects   Outcome: Progressing  Goal: Refrain from isolation  Description: Interventions:  - Develop a trusting relationship   - Encourage socialization   Outcome: Progressing     Problem: Anxiety  Goal: Anxiety is at manageable level  Description: Interventions:  - Assess and monitor patient's anxiety level  - Monitor for signs and symptoms (heart palpitations, chest pain, shortness of breath, headaches, nausea, feeling jumpy, restlessness, irritable, apprehensive)  - Collaborate with interdisciplinary team and initiate plan and interventions as ordered  - [place] patient to unit/surroundings  - Explain treatment plan  - Encourage participation in care  - Encourage verbalization of concerns/fears  - Identify coping mechanisms  - Assist in developing anxiety-reducing skills  - Administer/offer alternative therapies  - Limit or eliminate stimulants  Outcome: Progressing     Problem: SAFETY ADULT  Goal: Patient will remain free of falls  Description: INTERVENTIONS:  - Educate patient/family on patient safety including physical limitations  - Instruct patient to call for assistance with activity   - Consult OT/PT to assist with strengthening/mobility   - Keep Call bell within reach  - Keep bed low and locked with side rails adjusted as appropriate  - Keep care items and personal belongings within reach  - Initiate and maintain comfort rounds  - Make Fall Risk Sign visible to staff  - Apply yellow socks and bracelet for high fall risk patients  - Consider moving patient to room near nurses station  Outcome: Progressing     Problem: Nutrition/Hydration-ADULT  Goal: Nutrient/Hydration intake appropriate for improving, restoring or maintaining nutritional needs  Description: Monitor and assess patient's nutrition/hydration status for malnutrition  Collaborate with interdisciplinary team and initiate plan and interventions as ordered  Monitor patient's weight and dietary intake as ordered or per policy  Utilize nutrition screening tool and intervene as necessary  Determine patient's food preferences and provide high-protein, high-caloric foods as appropriate       INTERVENTIONS:  - Monitor oral intake, urinary output, labs, and treatment plans  - Assess nutrition and hydration status and recommend course of action  - Evaluate amount of meals eaten  - Assist patient with eating if necessary   - Allow adequate time for meals  - Recommend/ encourage appropriate diets, oral nutritional supplements, and vitamin/mineral supplements  - Order, calculate, and assess calorie counts as needed  - Recommend, monitor, and adjust tube feedings and TPN/PPN based on assessed needs  - Assess need for intravenous fluids  - Provide specific nutrition/hydration education as appropriate  - Include patient/family/caregiver in decisions related to nutrition  Outcome: Progressing

## 2022-12-17 NOTE — NURSING NOTE
Pt initially uncooperative with morning med pass  Staff was able to discuss importance of taking meds and pt did take at 1015  Pt visible on unit at times, attending some groups

## 2022-12-17 NOTE — NURSING NOTE
Pt irritable this morning  Pt pleasant during the evening  Denies SI/HI/AH/VH  Pt resting in the day room with peers  Complaint with medication  OOB for meals  Pt reports "I'm depressed but okay"  Denies any question or concern at this time

## 2022-12-17 NOTE — PROGRESS NOTES
Progress Note - Behavioral Health   Leydi Pamela Timmons 47 y o  female MRN: [id number]  Unit/Bed#: U 256-01 Encounter: [id number]    Assessment/Plan   Principal Problem:    Major depression with psychotic features ([facility] )  Active Problems:    Primary hypertension    Hyperlipidemia    Class 3 severe obesity due to excess calories without serious comorbidity in [facility])    Chronic midline low back pain without sciatica    PTSD (post-traumatic stress disorder)    Borderline personality disorder ([facility] )    Medical clearance for psychiatric admission    Seizures ([place] [address] )    • Continue medications as noted below  • Continue to promote patient participation in group therapy, milieu therapy, occupational therapy  • Continue medical management by primary team   • Discharge disposition:  Pending    Subjective: The patient was evaluated this morning for continuity of care and no acute distress noted throughout the evaluation  Over the past 24 hours staff noted the patient was visible in the milieu yesterday  Has reported auditory hallucinations to staff, but appeared to not be preoccupied and does not appear to be responding to internal stimuli  When asked about her mood, she notes "not good "  Observed playing cards, smiling and laughing on the phone, with an affect that is incongruent with stated mood  Today on evaluation Leydi was evaluated at bedside  She notes that her mood is "not good ", but does not elaborate further  Appears to be somewhat uninterested in evaluation and interaction at this time  Reports good appetite, energy levels  When asked about sleep, she notes "so-so "  Denies suicidal ideation, homicidal ideation  Denies auditory hallucinations, visual hallucinations  Feels safe on the unit      Psychiatric Review of Systems:  Behavior over the last 24 hours:  unchanged  Sleep: normal  Appetite: normal  Medication side effects: No   ROS: no complaints, all other systems are negative    Current Medications:  Current Facility-Administered Medications   Medication Dose Route Frequency Provider Last Rate   • acetaminophen  650 mg Oral Q6H PRN Lucia Plasencia PA-C     • acetaminophen  650 mg Oral Q4H PRN Lucia Plasencia PA-C     • acetaminophen  975 mg Oral Q6H PRN Lucia Plasencia PA-C     • aluminum-magnesium hydroxide-simethicone  30 mL Oral Q4H PRN Lucia Plasencia PA-C     • aspirin  81 mg Oral Daily Lucia Plasencia PA-C     • atorvastatin  40 mg Oral Daily With yDlan Cr PA-C     • benzonatate  100 mg Oral TID PRN April Suárez PA-C     • haloperidol lactate  2 5 mg Intramuscular Q6H PRN Max 4/day Lucia Plasencia PA-C      And   • LORazepam  1 mg Intramuscular Q6H PRN Max 4/day Lucia Plasencia PA-C      And   • benztropine  0 5 mg Intramuscular Q6H PRN Max 4/day Lucia Plasencia PA-C     • haloperidol lactate  5 mg Intramuscular Q4H PRN Max 4/day Lucia Plasencia PA-C      And   • LORazepam  2 mg Intramuscular Q4H PRN Max 4/day Lucia Plasencia PA-C      And   • benztropine  1 mg Intramuscular Q4H PRN Max 4/day Lucia Plasencia PA-C     • benztropine  1 mg Intramuscular Q4H PRN Max 6/day Lucia Plasencia PA-C     • benztropine  1 mg Oral Q4H PRN Max 6/day Lucia Plasencia PA-C     • bisacodyl  10 mg Rectal Daily PRN Lucia Plasencia PA-C     • cholecalciferol  400 Units Oral Daily Lucia Plasencia PA-C     • cyanocobalamin  1,000 mcg Oral Daily Lucia Plasencia PA-C     • hydrOXYzine HCL  50 mg Oral Q6H PRN Max 4/day Lucia Plasencia PA-C      Or   • diphenhydrAMINE  50 mg Intramuscular Q6H PRN Lucia Plasencia PA-C     • divalproex sodium  2,000 mg Oral HS Lucia Plasencia PA-C     • FLUoxetine  40 mg Oral Daily Lucia Plasencia PA-C     • fluticasone  1 spray Each Nare Daily Arvis Guerrero [place], PA-C     • folic acid  1 mg Oral Daily Lucia Plasencia PA-C     • glycerin-hypromellose-  1 drop Both Eyes Q3H PRN Lucia Plasencia PA-C     • guaiFENesin  600 mg Oral Q12H [address] [place], [place]     • haloperidol  10 mg Oral BID Nichol Dixon MD     • haloperidol  2 mg Oral Q4H PRN Max 6/day Clara Hearn PA-C     • haloperidol  5 mg Oral Q6H PRN Max 4/day Clara Hearn PA-C     • haloperidol  5 mg Oral Q4H PRN Max 4/day Clara Hearn PA-C     • hydrOXYzine HCL  100 mg Oral Q6H PRN Max 4/day Clara Hearn PA-C      Or   • LORazepam  2 mg Intramuscular Q6H PRN Clara Hearn PA-C     • hydrOXYzine HCL  25 mg Oral Q6H PRN Max 4/day Clara Hearn PA-C     • lisinopril  20 mg Oral Daily Clara Hearn PA-C     • mirtazapine  45 mg Oral HS Clara Hearn PA-C     • polyethylene glycol  17 g Oral Daily PRN Clara Hearn PA-C     • prazosin  2 mg Oral HS Nichol Dixon MD     • senna  1 tablet Oral BID Clara Hearn PA-C     • senna-docusate sodium  1 tablet Oral Daily PRN Clara Hearn PA-C     • traZODone  100 mg Oral HS Nichol Dixon MD     • traZODone  50 mg Oral HS PRN Clara Hearn PA-C     • ziprasidone  40 mg Oral BID With Meals Clara Hearn PA-C         Behavioral Health Medications: all current active meds have been reviewed and continue current psychiatric medications  Vital signs in last 24 hours:  Temp:  [97 5 °F (36 4 °C)] 97 5 °F (36 4 °C)  HR:  [86] 86  Resp:  [18] 18  BP: (112-130)/(68-82) 130/82    Laboratory results:    I have personally reviewed all pertinent laboratory/tests results    Most Recent Labs:   Lab Results   Component Value Date    WBC 10 41 (H) 12/13/2022    RBC 4 17 12/13/2022    HGB 13 6 12/13/2022    HCT 41 0 12/13/2022     12/13/2022    RDW 13 3 12/13/2022    TOTANEUTABS 5 83 12/13/2022    NEUTROABS 2 50 08/02/2022    SODIUM 138 12/13/2022    K 4 1 12/13/2022    CL 99 12/13/2022    CO2 26 12/13/2022    BUN 14 12/13/2022    CREATININE 0 70 12/13/2022    GLUC 130 12/13/2022    GLUF 130 (H) 12/13/2022    CALCIUM 9 0 12/13/2022    AST 18 12/13/2022    ALT 25 12/13/2022    ALKPHOS 69 12/13/2022    TP 8 0 12/13/2022    ALB 3 6 12/13/2022    TBILI 0 50 12/13/2022    CHOLESTEROL 177 12/13/2022    HDL 40 (L) 12/13/2022    TRIG 244 (H) 12/13/2022    LDLCALC 88 12/13/2022    NONHDLC 137 12/13/2022    VALPROICTOT 66 11/04/2022    [id number] 3 262 12/13/2022    PREGUR negative 11/06/2022    PREGSERUM Negative 04/28/2022    RPR Non-Reactive 12/13/2022    HGBA1C 4 7 12/13/2022    EAG 88 12/13/2022           Mental Status Evaluation:    Appearance:  age appropriate, casually dressed,  female, increased BMI  Behavior:  cooperative, guarded, not as engaged in conversation   Speech:  normal rate and volume   Mood:  "Not good "   Affect:  constricted   Thought Process:  goal directed   Associations: concrete associations   Thought Content:  no overt delusions   Perceptual Disturbances: no auditory hallucinations, no visual hallucinations, does not appear responding to internal stimuli   Risk Potential: Suicidal ideation - None at present  Homicidal ideation - None  Potential for aggression - No   Sensorium:  oriented to person, place and time/date   Memory:  recent and remote memory grossly intact   Consciousness:  alert and awake   Attention/Concentration: attention span and concentration appear shorter than expected for age   Insight:  limited   Judgment: limited   Gait/Station: in bed   Motor Activity: no abnormal movements     Progress Toward Goals: Progressing    Recommended Treatment:   See above for assessment and plan  Risks, benefits and possible side effects of Medications:   Risks, benefits, and possible side effects of medications explained to patient and patient verbalizes understanding  Treatment discussed with nursing staff  This note has been constructed using a voice recognition system  There may be translation, syntax, or grammatical errors  If you have any questions, please contact the dictating provider      Shazia Plascencia Martin Vazquez MD

## 2022-12-17 NOTE — TREATMENT TEAM
12/17/22 1000   Team Meeting   Meeting Type Daily Rounds   Team Members Present   Team Members Present Physician;Nurse   Physician Team Member Dr Vika Gutierrez Team Member Moraima   Patient/Family Present   Patient Present No   Patient's Family Present No     AM Rounds:  naps after breakfast, visible in Milieu throughout day, report AH, does not appear preoccupied, No RIS, "not good" when asked about mood, observed playing cards, smiling/laughing on phone

## 2022-12-18 RX ADMIN — GUAIFENESIN 600 MG: 600 TABLET ORAL at 09:00

## 2022-12-18 RX ADMIN — CHOLECALCIFEROL (VITAMIN D3) 10 MCG (400 UNIT) TABLET 400 UNITS: at 09:01

## 2022-12-18 RX ADMIN — LISINOPRIL 20 MG: 20 TABLET ORAL at 09:01

## 2022-12-18 RX ADMIN — CYANOCOBALAMIN TAB 500 MCG 1000 MCG: 500 TAB at 09:01

## 2022-12-18 RX ADMIN — GUAIFENESIN 600 MG: 600 TABLET ORAL at 21:16

## 2022-12-18 RX ADMIN — ZIPRASIDONE HCL 40 MG: 40 CAPSULE ORAL at 09:01

## 2022-12-18 RX ADMIN — POLYETHYLENE GLYCOL 3350 17 G: 17 POWDER, FOR SOLUTION ORAL at 09:07

## 2022-12-18 RX ADMIN — TRAZODONE HYDROCHLORIDE 100 MG: 100 TABLET ORAL at 21:16

## 2022-12-18 RX ADMIN — SENNOSIDES 8.6 MG: 8.6 TABLET, FILM COATED ORAL at 17:58

## 2022-12-18 RX ADMIN — FOLIC ACID 1 MG: 1 TABLET ORAL at 09:00

## 2022-12-18 RX ADMIN — ASPIRIN 81 MG CHEWABLE TABLET 81 MG: 81 TABLET CHEWABLE at 09:01

## 2022-12-18 RX ADMIN — MIRTAZAPINE 45 MG: 15 TABLET, FILM COATED ORAL at 21:16

## 2022-12-18 RX ADMIN — HALOPERIDOL 10 MG: 10 TABLET ORAL at 09:00

## 2022-12-18 RX ADMIN — SENNOSIDES 8.6 MG: 8.6 TABLET, FILM COATED ORAL at 09:00

## 2022-12-18 RX ADMIN — FLUOXETINE 40 MG: 20 CAPSULE ORAL at 09:00

## 2022-12-18 RX ADMIN — ATORVASTATIN CALCIUM 40 MG: 40 TABLET, FILM COATED ORAL at 17:58

## 2022-12-18 RX ADMIN — HALOPERIDOL 10 MG: 10 TABLET ORAL at 17:58

## 2022-12-18 RX ADMIN — ZIPRASIDONE HCL 40 MG: 40 CAPSULE ORAL at 17:58

## 2022-12-18 RX ADMIN — DIVALPROEX SODIUM 2000 MG: 500 TABLET, EXTENDED RELEASE ORAL at 21:16

## 2022-12-18 NOTE — PROGRESS NOTES
Progress Note - Behavioral Health   Leydi Chavez 47 y o  female MRN: [id number]  Unit/Bed#: [facility] 256-01 Encounter: [id number]    Assessment/Plan   Principal Problem:    Major depression with psychotic features ([facility] )  Active Problems:    Primary hypertension    Hyperlipidemia    Class 3 severe obesity due to excess calories without serious comorbidity in [facility])    Chronic midline low back pain without sciatica    PTSD (post-traumatic stress disorder)    Borderline personality disorder ([facility] )    Medical clearance for psychiatric admission    Seizures ([place] [id number] )    • Continue medications as noted below  • Continue to promote patient participation in group therapy, milieu therapy, occupational therapy  • As needed medications for bowel movement  • Continue medical management by primary team   • Discharge disposition:  Pending    Subjective: The patient was evaluated this morning for continuity of care and no acute distress noted throughout the evaluation  Over the past 24 hours staff noted the patient was pleasant during interaction with staff in the evening time  Has denied SI/HI/AVH  Been observed in the day room with peers, adherent with medication regimen  Out of bed for meals  Reported some depression, but said that she was "okay "  Today on evaluation Leydi was evaluated at bedside  Patient awoke when spoken to  She notes that her mood is "okay ", but then said it was okay after asking  She says that she had a fallout with her boyfriend who lives at her group home, and at this time does not want to speak with him, although at some point she wants to speak with him  She denies SI/HI  She denies AVH  Denies feeling anxious at this time  For that she has been experiencing constipation  Encouraged patient to request medications to help with bowel movements      Psychiatric Review of Systems:  Behavior over the last 24 hours:  unchanged  Sleep: normal  Appetite: normal  Medication side effects: No   ROS: reports constipation, all other systems are negative    Current Medications:  Current Facility-Administered Medications   Medication Dose Route Frequency Provider Last Rate   • acetaminophen  650 mg Oral Q6H PRN Xochitl Fraction, PA-C     • acetaminophen  650 mg Oral Q4H PRN Xochitl Fraction, PA-C     • acetaminophen  975 mg Oral Q6H PRN Xochitl Fraction, PA-C     • aluminum-magnesium hydroxide-simethicone  30 mL Oral Q4H PRN Xochitl Fraction, PA-C     • aspirin  81 mg Oral Daily Xochitl Fraction, PA-C     • atorvastatin  40 mg Oral Daily With [facility], PA-C     • benzonatate  100 mg Oral TID PRN Kip [place], PA-C     • haloperidol lactate  2 5 mg Intramuscular Q6H PRN Max 4/day Xochtil Fraction, PA-C      And   • LORazepam  1 mg Intramuscular Q6H PRN Max 4/day Xochitl Fraction, PA-C      And   • benztropine  0 5 mg Intramuscular Q6H PRN Max 4/day Xochitl Fraction, PA-C     • haloperidol lactate  5 mg Intramuscular Q4H PRN Max 4/day Xochitl Fraction, PA-C      And   • LORazepam  2 mg Intramuscular Q4H PRN Max 4/day Xochitl Fraction, PA-C      And   • benztropine  1 mg Intramuscular Q4H PRN Max 4/day Xochitl Fraction, PA-C     • benztropine  1 mg Intramuscular Q4H PRN Max 6/day Xochitl Fraction, PA-C     • benztropine  1 mg Oral Q4H PRN Max 6/day Xochitl Fraction, PA-C     • bisacodyl  10 mg Rectal Daily PRN Xochitl Fraction, PA-C     • cholecalciferol  400 Units Oral Daily Xochitl Fraction, PA-C     • cyanocobalamin  1,000 mcg Oral Daily Xochitl Fraction, PA-C     • hydrOXYzine HCL  50 mg Oral Q6H PRN Max 4/day Xochitl Fraction, PA-C      Or   • diphenhydrAMINE  50 mg Intramuscular Q6H PRN Xochitl Fraction, PA-C     • divalproex sodium  2,000 mg Oral HS Xochitl Fraction, PA-C     • FLUoxetine  40 mg Oral Daily Xochitl Fraction, PA-C     • fluticasone  1 spray Each Nare Daily Kip Leonie Ortega, PA-C     • folic acid  1 mg Oral Daily Xochitl Fraction, PA-C     • glycerin-hypromellose-  1 drop Both Eyes Q3H PRN Junita Goodpasture, PA-C     • guaiFENesin  600 mg Oral Q12H [address] [place], [place]     • haloperidol  10 mg Oral BID Heidi Cain MD     • haloperidol  2 mg Oral Q4H PRN Max 6/day Junita Goodpasture, PA-C     • haloperidol  5 mg Oral Q6H PRN Max 4/day Junita Goodpasture, PA-C     • haloperidol  5 mg Oral Q4H PRN Max 4/day Junita Goodpasture, PA-C     • hydrOXYzine HCL  100 mg Oral Q6H PRN Max 4/day Junita Goodpasture, PA-C      Or   • LORazepam  2 mg Intramuscular Q6H PRN Junita Goodpasture, PA-C     • hydrOXYzine HCL  25 mg Oral Q6H PRN Max 4/day Junita Goodpasture, PA-C     • lisinopril  20 mg Oral Daily Junita Goodpasture, PA-C     • mirtazapine  45 mg Oral HS Junita Goodpasture, PA-C     • polyethylene glycol  17 g Oral Daily PRN Junita Goodpasture, PA-C     • prazosin  2 mg Oral HS Heidi Cain MD     • senna  1 tablet Oral BID Junita Goodpasture, JO     • senna-docusate sodium  1 tablet Oral Daily PRN Junita Goodpasture, PA-C     • traZODone  100 mg Oral HS Heidi Cain MD     • traZODone  50 mg Oral HS PRN Junita Goodpasture, JO     • ziprasidone  40 mg Oral BID With Meals Junita Goodpasture, PA-C         Behavioral Health Medications: all current active meds have been reviewed and continue current psychiatric medications  Vital signs in last 24 hours:  Temp:  [97 8 °F (36 6 °C)-97 9 °F (36 6 °C)] 97 8 °F (36 6 °C)  HR:  [72-91] 72  Resp:  [16-18] 16  BP: (110-115)/(76-81) 115/76    Laboratory results:    I have personally reviewed all pertinent laboratory/tests results    Most Recent Labs:   Lab Results   Component Value Date    WBC 10 41 (H) 12/13/2022    RBC 4 17 12/13/2022    HGB 13 6 12/13/2022    HCT 41 0 12/13/2022     12/13/2022    RDW 13 3 12/13/2022    TOTANEUTABS 5 83 12/13/2022    NEUTROABS 2 50 08/02/2022    SODIUM 138 12/13/2022    K 4 1 12/13/2022    CL 99 12/13/2022    CO2 26 12/13/2022    BUN 14 12/13/2022    CREATININE 0 70 12/13/2022    GLUC 130 12/13/2022    GLUF 130 (H) 12/13/2022    CALCIUM 9 0 12/13/2022    AST 18 12/13/2022    ALT 25 12/13/2022    ALKPHOS 69 12/13/2022    TP 8 0 12/13/2022    ALB 3 6 12/13/2022    TBILI 0 50 12/13/2022    CHOLESTEROL 177 12/13/2022    HDL 40 (L) 12/13/2022    TRIG 244 (H) 12/13/2022    LDLCALC 88 12/13/2022    NONHDLC 137 12/13/2022    VALPROICTOT 66 11/04/2022    [id number] 3 262 12/13/2022    PREGUR negative 11/06/2022    PREGSERUM Negative 04/28/2022    RPR Non-Reactive 12/13/2022    HGBA1C 4 7 12/13/2022    EAG 88 12/13/2022           Mental Status Evaluation:    Appearance:  age appropriate, casually dressed  female  Increased BMI  Behavior:  pleasant, cooperative, calm   Speech:  normal rate and volume   Mood:  "Okay "   Affect:  constricted   Thought Process:  goal directed   Associations: intact associations   Thought Content:  no overt delusions   Perceptual Disturbances: no auditory hallucinations, no visual hallucinations   Risk Potential: Suicidal ideation - None  Homicidal ideation - None  Potential for aggression - No   Sensorium:  oriented to person, place and time/date   Memory:  recent and remote memory grossly intact   Consciousness:  alert and awake   Attention/Concentration: attention span and concentration appear shorter than expected for age   Insight:  limited   Judgment: limited   Gait/Station: normal gait/station   Motor Activity: no abnormal movements     Progress Toward Goals: Progressing    Recommended Treatment:   See above for assessment and plan  Risks, benefits and possible side effects of Medications:   Risks, benefits, and possible side effects of medications explained to patient and patient verbalizes understanding  Treatment discussed with nursing staff  This note has been constructed using a voice recognition system  There may be translation, syntax, or grammatical errors   If you have any questions, please contact the dictating provider      Fco Garcia MD

## 2022-12-18 NOTE — PLAN OF CARE
Problem: Risk for Self Injury/Neglect  Goal: Verbalize thoughts and feelings  Description: Interventions:  - Assess and re-assess patient's lethality and potential for self-injury  - Engage patient in 1:1 interactions, daily, for a minimum of 15 minutes  - Encourage patient to express feelings, fears, frustrations, hopes  - Establish rapport/trust with patient   Outcome: Progressing  Goal: Refrain from harming self  Description: Interventions:  - Monitor patient closely, per order  - Develop a trusting relationship  - Supervise medication ingestion, monitor effects and side effects   Outcome: Progressing  Goal: Complete daily ADLs, including personal hygiene independently, as able  Description: Interventions:  - Observe, teach, and assist patient with ADLS  - Monitor and promote a balance of rest/activity, with adequate nutrition and elimination  Outcome: Progressing     Problem: Depression  Goal: Verbalize thoughts and feelings  Description: Interventions:  - Assess and re-assess patient's level of risk   - Engage patient in 1:1 interactions, daily, for a minimum of 15 minutes   - Encourage patient to express feelings, fears, frustrations, hopes   Outcome: Progressing  Goal: Refrain from harming self  Description: Interventions:  - Monitor patient closely, per order   - Supervise medication ingestion, monitor effects and side effects   Outcome: Progressing  Goal: Refrain from isolation  Description: Interventions:  - Develop a trusting relationship   - Encourage socialization   Outcome: Progressing     Problem: Anxiety  Goal: Anxiety is at manageable level  Description: Interventions:  - Assess and monitor patient's anxiety level  - Monitor for signs and symptoms (heart palpitations, chest pain, shortness of breath, headaches, nausea, feeling jumpy, restlessness, irritable, apprehensive)  - Collaborate with interdisciplinary team and initiate plan and interventions as ordered    - [place] patient to unit/surroundings  - Explain treatment plan  - Encourage participation in care  - Encourage verbalization of concerns/fears  - Identify coping mechanisms  - Assist in developing anxiety-reducing skills  - Administer/offer alternative therapies  - Limit or eliminate stimulants  Outcome: Progressing     Problem: SAFETY ADULT  Goal: Patient will remain free of falls  Description: INTERVENTIONS:  - Educate patient/family on patient safety including physical limitations  - Instruct patient to call for assistance with activity   - Consult OT/PT to assist with strengthening/mobility   - Keep Call bell within reach  - Keep bed low and locked with side rails adjusted as appropriate  - Keep care items and personal belongings within reach  - Initiate and maintain comfort rounds  - Make Fall Risk Sign visible to staff  - Apply yellow socks and bracelet for high fall risk patients  - Consider moving patient to room near nurses station  Outcome: Progressing     Problem: Nutrition/Hydration-ADULT  Goal: Nutrient/Hydration intake appropriate for improving, restoring or maintaining nutritional needs  Description: Monitor and assess patient's nutrition/hydration status for malnutrition  Collaborate with interdisciplinary team and initiate plan and interventions as ordered  Monitor patient's weight and dietary intake as ordered or per policy  Utilize nutrition screening tool and intervene as necessary  Determine patient's food preferences and provide high-protein, high-caloric foods as appropriate       INTERVENTIONS:  - Monitor oral intake, urinary output, labs, and treatment plans  - Assess nutrition and hydration status and recommend course of action  - Evaluate amount of meals eaten  - Assist patient with eating if necessary   - Allow adequate time for meals  - Recommend/ encourage appropriate diets, oral nutritional supplements, and vitamin/mineral supplements  - Order, calculate, and assess calorie counts as needed  - Recommend, monitor, and adjust tube feedings and TPN/PPN based on assessed needs  - Assess need for intravenous fluids  - Provide specific nutrition/hydration education as appropriate  - Include patient/family/caregiver in decisions related to nutrition  Outcome: Progressing

## 2022-12-18 NOTE — TREATMENT TEAM
12/18/22 0800   Team Meeting   Meeting Type Daily Rounds   Team Members Present   Team Members Present Physician;Nurse   Physician Team Member [facility]   Nursing Team Member Rip   Patient/Family Present   Patient Present No   Patient's Family Present No     AM rounds- denies SI, initially refused scheduled medications however later was compliant  Remains pleasant in conversation  C/o constipation and received prn medications and prune juice  Slept well

## 2022-12-18 NOTE — NURSING NOTE
Pt received prune juice before dinner  Pt complaining of abdomen pain from the constipation  Pt encouraged to walk and to drink adequate fluid  Pt was able to have a bowel movement and reported "I feel great"  Denies SI/HI/AH/VH  Pt complaint with medication  Denies any question or concern at this time

## 2022-12-18 NOTE — NURSING NOTE
Pt remains pleasant in conversation  Reports having mild anxiety and depression which pt states is an improvement since admission to hospital  Pt denies SI/HI, AVH  Reports having constipation and unsure when last BM was  Pt received prn medication and prune juice  Denies any discomfort to abdomen  Pt withdrawn to room, sleeping at times  Needs encouraged to attend groups  Denies any questions at this time

## 2022-12-19 PROBLEM — Z00.8 MEDICAL CLEARANCE FOR PSYCHIATRIC ADMISSION: Status: RESOLVED | Noted: 2022-04-27 | Resolved: 2022-12-19

## 2022-12-19 LAB — SARS-COV-2 RNA RESP QL NAA+PROBE: NEGATIVE

## 2022-12-19 RX ORDER — ASPIRIN 81 MG/1
81 TABLET, CHEWABLE ORAL DAILY
Qty: 30 TABLET | Refills: 0 | Status: SHIPPED | OUTPATIENT
Start: 2022-12-19 | End: 2023-01-18

## 2022-12-19 RX ORDER — AMOXICILLIN 250 MG
1 CAPSULE ORAL 2 TIMES DAILY
Qty: 60 TABLET | Refills: 0 | Status: SHIPPED | OUTPATIENT
Start: 2022-12-19 | End: 2023-01-18

## 2022-12-19 RX ORDER — FLUTICASONE PROPIONATE 50 MCG
1 SPRAY, SUSPENSION (ML) NASAL DAILY
Qty: 11.1 ML | Refills: 0 | Status: SHIPPED | OUTPATIENT
Start: 2022-12-20

## 2022-12-19 RX ORDER — ATORVASTATIN CALCIUM 40 MG/1
40 TABLET, FILM COATED ORAL
Qty: 30 TABLET | Refills: 0 | Status: SHIPPED | OUTPATIENT
Start: 2022-12-19 | End: 2023-01-18

## 2022-12-19 RX ORDER — MIRTAZAPINE 45 MG/1
45 TABLET, FILM COATED ORAL
Qty: 30 TABLET | Refills: 0 | Status: SHIPPED | OUTPATIENT
Start: 2022-12-19 | End: 2023-01-18

## 2022-12-19 RX ORDER — FLUOXETINE HYDROCHLORIDE 40 MG/1
40 CAPSULE ORAL DAILY
Qty: 30 CAPSULE | Refills: 0 | Status: SHIPPED | OUTPATIENT
Start: 2022-12-20 | End: 2023-01-19

## 2022-12-19 RX ORDER — HALOPERIDOL 10 MG/1
10 TABLET ORAL 2 TIMES DAILY
Qty: 60 TABLET | Refills: 0 | Status: SHIPPED | OUTPATIENT
Start: 2022-12-19 | End: 2023-01-18

## 2022-12-19 RX ORDER — ZIPRASIDONE HYDROCHLORIDE 40 MG/1
40 CAPSULE ORAL 2 TIMES DAILY WITH MEALS
Qty: 60 CAPSULE | Refills: 0 | Status: SHIPPED | OUTPATIENT
Start: 2022-12-19 | End: 2023-01-18

## 2022-12-19 RX ORDER — PRAZOSIN HYDROCHLORIDE 2 MG/1
2 CAPSULE ORAL
Qty: 30 CAPSULE | Refills: 0 | Status: SHIPPED | OUTPATIENT
Start: 2022-12-19 | End: 2023-01-18

## 2022-12-19 RX ORDER — GUAIFENESIN 600 MG/1
600 TABLET, EXTENDED RELEASE ORAL EVERY 12 HOURS SCHEDULED
Qty: 14 TABLET | Refills: 0 | Status: SHIPPED | OUTPATIENT
Start: 2022-12-19 | End: 2022-12-26

## 2022-12-19 RX ORDER — OMEGA-3S/DHA/EPA/FISH OIL/D3 300MG-1000
400 CAPSULE ORAL DAILY
Qty: 30 TABLET | Refills: 0 | Status: SHIPPED | OUTPATIENT
Start: 2022-12-19 | End: 2023-01-18

## 2022-12-19 RX ORDER — DIVALPROEX SODIUM 500 MG/1
2000 TABLET, EXTENDED RELEASE ORAL
Qty: 120 TABLET | Refills: 0 | Status: SHIPPED | OUTPATIENT
Start: 2022-12-19 | End: 2023-01-18

## 2022-12-19 RX ORDER — TRAZODONE HYDROCHLORIDE 100 MG/1
100 TABLET ORAL
Qty: 30 TABLET | Refills: 0 | Status: SHIPPED | OUTPATIENT
Start: 2022-12-19 | End: 2023-01-18

## 2022-12-19 RX ORDER — CYANOCOBALAMIN (VITAMIN B-12) 500 MCG
1000 TABLET ORAL DAILY
Qty: 30 TABLET | Refills: 0 | Status: SHIPPED | OUTPATIENT
Start: 2022-12-19

## 2022-12-19 RX ORDER — FOLIC ACID 1 MG/1
1 TABLET ORAL DAILY
Qty: 30 TABLET | Refills: 0 | Status: SHIPPED | OUTPATIENT
Start: 2022-12-19 | End: 2023-01-18

## 2022-12-19 RX ORDER — LISINOPRIL 20 MG/1
20 TABLET ORAL DAILY
Qty: 30 TABLET | Refills: 0 | Status: SHIPPED | OUTPATIENT
Start: 2022-12-19 | End: 2023-01-18

## 2022-12-19 RX ADMIN — PRAZOSIN HYDROCHLORIDE 2 MG: 1 CAPSULE ORAL at 21:21

## 2022-12-19 RX ADMIN — ZIPRASIDONE HCL 40 MG: 40 CAPSULE ORAL at 08:27

## 2022-12-19 RX ADMIN — LISINOPRIL 20 MG: 20 TABLET ORAL at 08:29

## 2022-12-19 RX ADMIN — GUAIFENESIN 600 MG: 600 TABLET ORAL at 21:23

## 2022-12-19 RX ADMIN — FOLIC ACID 1 MG: 1 TABLET ORAL at 08:27

## 2022-12-19 RX ADMIN — HALOPERIDOL 10 MG: 10 TABLET ORAL at 17:38

## 2022-12-19 RX ADMIN — SENNOSIDES 8.6 MG: 8.6 TABLET, FILM COATED ORAL at 17:38

## 2022-12-19 RX ADMIN — GUAIFENESIN 600 MG: 600 TABLET ORAL at 08:27

## 2022-12-19 RX ADMIN — FLUOXETINE 40 MG: 20 CAPSULE ORAL at 08:27

## 2022-12-19 RX ADMIN — ZIPRASIDONE HCL 40 MG: 40 CAPSULE ORAL at 17:30

## 2022-12-19 RX ADMIN — TRAZODONE HYDROCHLORIDE 100 MG: 100 TABLET ORAL at 21:21

## 2022-12-19 RX ADMIN — MIRTAZAPINE 45 MG: 15 TABLET, FILM COATED ORAL at 21:20

## 2022-12-19 RX ADMIN — ASPIRIN 81 MG CHEWABLE TABLET 81 MG: 81 TABLET CHEWABLE at 08:28

## 2022-12-19 RX ADMIN — HALOPERIDOL 10 MG: 10 TABLET ORAL at 08:27

## 2022-12-19 RX ADMIN — CYANOCOBALAMIN TAB 500 MCG 1000 MCG: 500 TAB at 08:27

## 2022-12-19 RX ADMIN — DIVALPROEX SODIUM 2000 MG: 500 TABLET, EXTENDED RELEASE ORAL at 21:18

## 2022-12-19 RX ADMIN — ATORVASTATIN CALCIUM 40 MG: 40 TABLET, FILM COATED ORAL at 17:30

## 2022-12-19 RX ADMIN — CHOLECALCIFEROL (VITAMIN D3) 10 MCG (400 UNIT) TABLET 400 UNITS: at 08:27

## 2022-12-19 RX ADMIN — SENNOSIDES 8.6 MG: 8.6 TABLET, FILM COATED ORAL at 08:31

## 2022-12-19 NOTE — PROGRESS NOTES
Pt completed ADL's  BP on Sunday was 86/72,  but they both corrected themselves  DC: TBD    12/19/22 0758   Team Meeting   Meeting Type Daily Rounds   Team Members Present   Team Members Present Physician;Nurse;; Other (Discipline and Name)   Physician Team Member Dr Batsheva Preciado / Jennifer Hu Team Member DANDRE [facility] Management Team Member Juan Carlos   Other (Discipline and Name) Nomi Ton - Art Therapy   Patient/Family Present   Patient Present No   Patient's Family Present No

## 2022-12-19 NOTE — TREATMENT TEAM
12/19/22 1721   Activity/Group Checklist   Group Community meeting   Attendance Attended   Attendance Duration (min) 16-30   Interactions Interacted appropriately   Affect/Mood Appropriate   Goals Achieved Able to listen to others; Able to engage in interactions; Other (Comment)  (identified goals for the day)

## 2022-12-19 NOTE — TREATMENT TEAM
12/19/22 1330   Activity/Group Checklist   Group Other (Comment)  (art therapy)   Attendance Attended   Attendance Duration (min) 46-60   Interactions Interacted appropriately   Affect/Mood Appropriate;Calm   Goals Achieved Able to listen to others; Able to engage in interactions; Other (Comment)  (authentic, spontaneous self expression, connection, and insight)

## 2022-12-19 NOTE — PLAN OF CARE
Problem: DISCHARGE PLANNING  Goal: Discharge to home or other facility with appropriate resources  Description: INTERVENTIONS:  - Identify barriers to discharge w/patient and caregiver  - Arrange for needed discharge resources and transportation as appropriate  - Identify discharge learning needs (meds, wound care, etc )  - Arrange for interpretive services to assist at discharge as needed  - Refer to Case Management Department for coordinating discharge planning if the patient needs post-hospital services based on physician/advanced practitioner order or complex needs related to functional status, cognitive ability, or social support system  Outcome: Adequate for Discharge  Note: D/c is tentative for tomorrow with Pt returning to [address] [place] [address] outpatient at [facility]

## 2022-12-19 NOTE — DISCHARGE INSTR - APPOINTMENTS
Morris Esposito or Lilliana, our Eliza and Reva, will be calling you after your discharge, on the phone number that you provided  They will be available as an additional support, if needed  If you wish to speak with one of them, you may contact Neale Goldmann at [phone] or Ryan Pathak at [phone]  You are being discharged home to [facility], located at [facility], [address] [place] [address]  The best way to contact you, is through your care coordinator, Sanam, at [phone]

## 2022-12-19 NOTE — DISCHARGE INSTR - OTHER ORDERS
Ifrah Brunner is a confidential 7 days/week telephone support service manned by trained mental health consumers  [facility] operates daily but is not able to accept calls between 2AM-6AM    [facility] provides support, a listening ear and can provide information about available services  [facility] specializes in the concerns of mental health consumers, their families and friends  However, we are also here for anyone who has a mental health concern, is confused about or just doesn't know anything about mental health or where to get information  To reach Ifrah Brunner, call [phone] accepts calls between 6:00 AM to 10:00 AM and from 4:00 PM to 12:00 AM      Text CONNECT to [phone] from anywhere in the [place], anytime, about any type of crisis  A live, trained Crisis Counselor receives the text and lets you know that they are here to listen  The volunteer Crisis Counselor will help you move from a hot moment to a cool moment  [facility] ([facility]) AT [facility] - licensed telephone and mobile crisis services that provide mental health assessments to all age groups regardless of income or insurance  [facility] operates 24-hour/7 days a week  [address] [place] [address] assists consumer who are experiencing a mental health emergency and lack the resources to assist themselves  Immediate intervention for suicidal and depressed individuals with home visits/outreach being top priority  Crisis can be contacted at [phone]  The [facility] Mental Illness [facility]) offers various education & support groups for you & your family  For more information visit their website at http://www I-lighting/     Dial 2-1-1 to get connected/get help  Free, confidential information & referral available 24/7: Aging Services, Child & Youth Services, Counseling, Education/Training, Food/Shelter/Clothing, Health Services, Parenting, Substance Abuse, Support Groups, Volunteer Opportunities, & much more    Phone: 2-1-1 or [phone], Web: GRACIA [email or web address] [facility], Email: [email or web address]    [facility]  The [facility] ([place] [address], [facility], [address] [place] [address]) offers persons with a mental illness a safe, healing environment to explore their personal and vocational potential and receive support in achieving their goals  Instead of traditional therapy, the work of the house is the rehabilitation  As members contribute meaningful work to the house, they build confidence and a sense of purpose  Be a Member - It's Free  Membership in the [facility] is open to any [facility] resident who is 25 or older and has a history of mental illness  Membership is free for life  [place] [address], [facility], [address] [place] [address]  Hours: Monday - Friday: 8 a m  - 4 p m  With varying hours on holidays   ([place] [address] [place] [address] ([address]) provides a stress-free atmosphere for persons 18 and older who have experienced mental health issues  What The [facility] [employer]  Holiday gatherings  Recovery  Employment  Education  Community Resources  Empowerment  Helps participants achieve their goals  Enhances quality of life  Encourages leadership    The [address]  Hours: Monday through Friday: 4 p m  - 9 p m  Saturday: 2 p m  - 8 p m  Closed Sundays  Varying hours on holidays            Contact [phone]    Support & Referral Helpline  Support and Referral Helpline is a 24-hour, 7 days-a-week, listening and referral service provided to all of [place]  Please call [phone] or tty [phone] to speak with one of our specialists  The helpline is possible through partnerships with the [address] [employer]        The [address] [place] [address] (formerly known as the [employer] [facility]) provides free and confidential emotional support to people in suicidal crisis or emotional distress 24 hours a day, 7 days a week, across the [place]  The Lifeline is comprised of a national network of over 200 local crisis centers, combining custom local care and resources with national standards and best practices

## 2022-12-19 NOTE — PROGRESS NOTES
Progress Note - Behavioral Health     Leydi Khan 47 y o  female MRN: [id number]   Unit/Bed#: [facility] 256-01 Encounter: [id number]    Behavior over the last 24 hours: unchanged  Leydi is a 50-year-old female with a history of MDD with psychotic features and borderline personality disorder who presents for psychiatric follow-up  Staff reports no behavioral issues overnight  She is pleasant, calm and cooperative upon approach  Her grooming is improved, her affect is brighter, there is less psychomotor retardation, and she has been more visible and social in the milieu  Despite this, she states, "I still feel depressed  I am still hearing voices "  She does not appear distracted or preoccupied, nor does she appear to be responding to internal stimuli  She has been compliant with her medications and feels they are helpful  Sleep and appetite are regular  Denies any additional concerns  She does not wish to return to her group home  Sleep: normal  Appetite: normal  Medication side effects: No   ROS: all other systems are negative    Mental Status Evaluation:    Appearance: casually dressed, appears consistent with stated age, improved grooming  Motor: no psychomotor disturbances, slow gait  Behavior: pleasant, calm, cooperative, interacts with this writer appropriately  Speech: slight delay, normal volume  Mood: "depressed"  Affect: bright, cheery, mood incongruent  Thought Process: organized, linear, decreased rate of thoughts; concrete associations  Thought Content: denies any delusional material, no preoccupation  Perception: Admits AH of voices telling her to harm herself, denies VH, states AH are chronic and somewhat improved relative to prior to admission; does not appear preoccupied, does not appear to be responding to internal stimuli, not demonstrating obvious negative symptoms, denies other perceptual disturbances  Risk Potential: denies suicidal ideation, plan, or intent   Denies homicidal ideation  Sensorium: Oriented to person, place, time, and situation  Cognition: cognitive ability appears low average but was not quantitatively tested  Consciousness: alert and awake  Attention/Concentration: able to focus without difficulty, attention and concentration are shorter than expected for age  Insight: Limited  Judgement: Limited    Vital signs in last 24 hours:    Temp:  [98 3 °F (36 8 °C)-98 7 °F (37 1 °C)] 98 3 °F (36 8 °C)  HR:  [] 73  Resp:  [16-18] 16  BP: ()/(66-89) 136/89    Laboratory results: I have personally reviewed all pertinent laboratory/tests results    Results from the past 24 hours: No results found for this or any previous visit (from the past 24 hour(s))    Most Recent Labs:   Lab Results   Component Value Date    WBC 10 41 (H) 12/13/2022    RBC 4 17 12/13/2022    HGB 13 6 12/13/2022    HCT 41 0 12/13/2022     12/13/2022    RDW 13 3 12/13/2022    NEUTROABS 2 50 08/02/2022    TOTANEUTABS 5 83 12/13/2022    SODIUM 138 12/13/2022    K 4 1 12/13/2022    CL 99 12/13/2022    CO2 26 12/13/2022    BUN 14 12/13/2022    CREATININE 0 70 12/13/2022    GLUC 130 12/13/2022    CALCIUM 9 0 12/13/2022    AST 18 12/13/2022    ALT 25 12/13/2022    ALKPHOS 69 12/13/2022    TP 8 0 12/13/2022    ALB 3 6 12/13/2022    TBILI 0 50 12/13/2022    CHOLESTEROL 177 12/13/2022    HDL 40 (L) 12/13/2022    TRIG 244 (H) 12/13/2022    [address] 88 12/13/2022    [place] 137 12/13/2022    VALPROICTOT 66 11/04/2022    ATG4ZNTGRFCX 3 262 12/13/2022    PREGUR negative 11/06/2022    PREGSERUM Negative 04/28/2022    RPR Non-Reactive 12/13/2022    HGBA1C 4 7 12/13/2022    EAG 88 12/13/2022       Progress Toward Goals: progressing, working on coping skills, discharge planning    Assessment/Plan   Principal Problem:    Major depression with psychotic features ([facility] [address] )  Active Problems:    Primary hypertension    Hyperlipidemia    Class 3 severe obesity due to excess calories without serious comorbidity in adult ([facility] )    Chronic midline low back pain without sciatica    PTSD (post-traumatic stress disorder)    Borderline personality disorder ([facility] )    Medical clearance for psychiatric admission    Seizures ([place] [address] )      Recommended Treatment:     Planned medication and treatment changes: All current active medications have been reviewed  Encourage group therapy, milieu therapy and occupational therapy  Behavioral Health checks every 7 minutes    Continue current medications  Discharge planning back to group [place] tomorrow  We will need screening COVID test today      Current Facility-Administered Medications   Medication Dose Route Frequency Provider Last Rate   • acetaminophen  650 mg Oral Q6H PRN [facility], PA-C     • acetaminophen  650 mg Oral Q4H PRN [facility], PA-C     • acetaminophen  975 mg Oral Q6H PRN [facility], PA-C     • aluminum-magnesium hydroxide-simethicone  30 mL Oral Q4H PRN [facility], PA-C     • aspirin  81 mg Oral Daily [facility], PA-C     • atorvastatin  40 mg Oral Daily With Lucas Pope PA-C     • benzonatate  100 mg Oral TID PRN JASMYN Verduzco-C     • haloperidol lactate  2 5 mg Intramuscular Q6H PRN Max 4/day [facility], PA-C      And   • LORazepam  1 mg Intramuscular Q6H PRN Max 4/day [facility], PA-C      And   • benztropine  0 5 mg Intramuscular Q6H PRN Max 4/day [facility], PA-C     • haloperidol lactate  5 mg Intramuscular Q4H PRN Max 4/day [facility], PA-C      And   • LORazepam  2 mg Intramuscular Q4H PRN Max 4/day [facility], PA-C      And   • benztropine  1 mg Intramuscular Q4H PRN Max 4/day [facility], PA-C     • benztropine  1 mg Intramuscular Q4H PRN Max 6/day [facility], PA-C     • benztropine  1 mg Oral Q4H PRN Max 6/day [facility], PA-C     • bisacodyl  10 mg Rectal Daily PRN Melinaie [facility], PA-C     • cholecalciferol  400 Units Oral Daily Ovi Jacksonant, PA-C     • cyanocobalamin  1,000 mcg Oral Daily Linh JO Daly     • hydrOXYzine HCL  50 mg Oral Q6H PRN Max 4/day Linh JO Daly      Or   • diphenhydrAMINE  50 mg Intramuscular Q6H PRN Linh JO Daly     • divalproex sodium  2,000 mg Oral HS Linh JULIA DalyC     • FLUoxetine  40 mg Oral Daily Linh JO Daly     • fluticasone  1 spray Each Nare Daily [facility], located within [facility] JO Ortega     • folic acid  1 mg Oral Daily Linh JO Daly     • glycerin-hypromellose-  1 drop Both Eyes Q3H PRN Linh JO Daly     • guaiFENesin  600 mg Oral Q12H [address] [place], [place]     • haloperidol  10 mg Oral BID Constantino Pickett MD     • haloperidol  2 mg Oral Q4H PRN Max 6/day Linh JO Daly     • haloperidol  5 mg Oral Q6H PRN Max 4/day Ilnh JO Daly     • haloperidol  5 mg Oral Q4H PRN Max 4/day Linh JO Daly     • hydrOXYzine HCL  100 mg Oral Q6H PRN Max 4/day Linh JO Daly      Or   • LORazepam  2 mg Intramuscular Q6H PRN Linh JO Daly     • hydrOXYzine HCL  25 mg Oral Q6H PRN Max 4/day Linh JULIA DalyC     • lisinopril  20 mg Oral Daily Linh JO Daly     • mirtazapine  45 mg Oral HS Linh JO Daly     • polyethylene glycol  17 g Oral Daily PRN Linh JO Daly     • prazosin  2 mg Oral HS Constantino Pickett MD     • senna  1 tablet Oral BID Linh JO Daly     • senna-docusate sodium  1 tablet Oral Daily PRN Linh JO Daly     • traZODone  100 mg Oral HS Constantino Pickett MD     • traZODone  50 mg Oral HS PRN Linh JO Daly     • ziprasidone  40 mg Oral BID With Meals Linh Daly PA-C       Risks / Benefits of Treatment:    Risks, benefits, and possible side effects of medications explained to patient and patient verbalizes understanding and agreement for treatment      Counseling / Coordination of Care:    Patient's progress discussed with staff in treatment team meeting  Medications, treatment progress and treatment plan reviewed with patient      Cyn Quintana PA-C 12/19/22

## 2022-12-19 NOTE — CASE MANAGEMENT
CM met briefly with Pt, who was waiting calmly for lunch  Pt reported she wasn't feeling good, stating, "I'm hearing all the voices today, it's not good"  However Pt did not appear any distress & quickly changed the topic to the weather & what CM was wearing  CM contacted [facility],  @ [phone] & spoke with Hussein Milner to confirm d/c for tomorrow  He asked that the COVID test results & Pt's medication list be faxed  CM faxed current medication & list agreed to fax COVID test results when available  CM contacted [facility] @ [phone] & was able to schedule Pt a follow-up appointment with Dr Willis Wood on 12/22 @ 1:50 PM (IN PERSON)  CM was transferred to Pt's therapist, Thao Huitron & left a message seeking a return call to schedule Pt an appointment
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
## 2022-12-19 NOTE — NURSING NOTE
Pt visible on unit and attending groups  Sitting in tv room on approach  Pt reports passive SI and AH  Feels safe on unit and in group setting  Appears comfortable and relaxed

## 2022-12-20 VITALS
HEART RATE: 83 BPM | HEIGHT: 63 IN | OXYGEN SATURATION: 95 % | SYSTOLIC BLOOD PRESSURE: 120 MMHG | BODY MASS INDEX: 45.54 KG/M2 | RESPIRATION RATE: 17 BRPM | TEMPERATURE: 97.4 F | DIASTOLIC BLOOD PRESSURE: 79 MMHG | WEIGHT: 257 LBS

## 2022-12-20 RX ADMIN — FLUTICASONE PROPIONATE 1 SPRAY: 50 SPRAY, METERED NASAL at 09:15

## 2022-12-20 RX ADMIN — GUAIFENESIN 600 MG: 600 TABLET ORAL at 09:16

## 2022-12-20 RX ADMIN — LISINOPRIL 20 MG: 20 TABLET ORAL at 09:16

## 2022-12-20 RX ADMIN — ASPIRIN 81 MG CHEWABLE TABLET 81 MG: 81 TABLET CHEWABLE at 09:16

## 2022-12-20 RX ADMIN — CYANOCOBALAMIN TAB 500 MCG 1000 MCG: 500 TAB at 09:16

## 2022-12-20 RX ADMIN — SENNOSIDES 8.6 MG: 8.6 TABLET, FILM COATED ORAL at 09:16

## 2022-12-20 RX ADMIN — FOLIC ACID 1 MG: 1 TABLET ORAL at 09:16

## 2022-12-20 RX ADMIN — HALOPERIDOL 10 MG: 10 TABLET ORAL at 09:16

## 2022-12-20 RX ADMIN — ZIPRASIDONE HCL 40 MG: 40 CAPSULE ORAL at 09:16

## 2022-12-20 RX ADMIN — CHOLECALCIFEROL (VITAMIN D3) 10 MCG (400 UNIT) TABLET 400 UNITS: at 09:16

## 2022-12-20 RX ADMIN — FLUOXETINE 40 MG: 20 CAPSULE ORAL at 09:16

## 2022-12-20 NOTE — NURSING NOTE
Leydi dash on approach, reports a positive mood this evening  Denies SI, denies negative thoughts, denies AVH  Patient said, "I'm leaving tomorrow!" Feels safe and ready to return to her group home  Patient encouraged to come to staff with any questions or concerns, verbalized understanding

## 2022-12-20 NOTE — TREATMENT TEAM
12/20/22 1000   Activity/Group Checklist   Group Other (Comment)  (art therapy)   Attendance Attended   Attendance Duration (min) Greater than 60   Interactions Interacted appropriately   Affect/Mood Appropriate;Calm   Goals Achieved Able to listen to others; Able to engage in interactions; Other (Comment)  (authentic, spontaneous self expression connection and insight)

## 2022-12-20 NOTE — TREATMENT TEAM
12/20/22 9559   Activity/Group Checklist   Group Admission/Discharge   Attendance Attended   Attendance Duration (min) 0-15   Interactions Interacted appropriately  (pt filled out the relapse prevention plan and 2nd DERS form with the assistance of this therapist  once completed she had no questions  she was encouraged to use her relapse rpevention plan when needed after DC and informed she would have a copy infolder)   Affect/Mood Appropriate   Goals Achieved Able to engage in interactions; Able to listen to others

## 2022-12-20 NOTE — PLAN OF CARE
Problem: DISCHARGE PLANNING  Goal: Discharge to home or other facility with appropriate resources  Description: INTERVENTIONS:  - Identify barriers to discharge w/patient and caregiver  - Arrange for needed discharge resources and transportation as appropriate  - Identify discharge learning needs (meds, wound care, etc )  - Arrange for interpretive services to assist at discharge as needed  - Refer to Case Management Department for coordinating discharge planning if the patient needs post-hospital services based on physician/advanced practitioner order or complex needs related to functional status, cognitive ability, or social support system  Outcome: Adequate for Discharge     Problem: Ineffective Coping  Goal: Identifies healthy coping skills  Outcome: Adequate for Discharge  Goal: Demonstrates healthy coping skills  Outcome: Adequate for Discharge  Goal: Participates in unit activities  Description: Interventions:  - Provide therapeutic environment   - Provide required programming   - Redirect inappropriate behaviors   Outcome: Adequate for Discharge     Problem: Risk for Self Injury/Neglect  Goal: Verbalize thoughts and feelings  Description: Interventions:  - Assess and re-assess patient's lethality and potential for self-injury  - Engage patient in 1:1 interactions, daily, for a minimum of 15 minutes  - Encourage patient to express feelings, fears, frustrations, hopes  - Establish rapport/trust with patient   Outcome: Adequate for Discharge  Goal: Refrain from harming self  Description: Interventions:  - Monitor patient closely, per order  - Develop a trusting relationship  - Supervise medication ingestion, monitor effects and side effects   Outcome: Adequate for Discharge  Goal: Complete daily ADLs, including personal hygiene independently, as able  Description: Interventions:  - Observe, teach, and assist patient with ADLS  - Monitor and promote a balance of rest/activity, with adequate nutrition and elimination  Outcome: Adequate for Discharge     Problem: Depression  Goal: Verbalize thoughts and feelings  Description: Interventions:  - Assess and re-assess patient's level of risk   - Engage patient in 1:1 interactions, daily, for a minimum of 15 minutes   - Encourage patient to express feelings, fears, frustrations, hopes   Outcome: Adequate for Discharge  Goal: Refrain from harming self  Description: Interventions:  - Monitor patient closely, per order   - Supervise medication ingestion, monitor effects and side effects   Outcome: Adequate for Discharge  Goal: Refrain from isolation  Description: Interventions:  - Develop a trusting relationship   - Encourage socialization   Outcome: Adequate for Discharge     Problem: Anxiety  Goal: Anxiety is at manageable level  Description: Interventions:  - Assess and monitor patient's anxiety level  - Monitor for signs and symptoms (heart palpitations, chest pain, shortness of breath, headaches, nausea, feeling jumpy, restlessness, irritable, apprehensive)  - Collaborate with interdisciplinary team and initiate plan and interventions as ordered    - [place] patient to unit/surroundings  - Explain treatment plan  - Encourage participation in care  - Encourage verbalization of concerns/fears  - Identify coping mechanisms  - Assist in developing anxiety-reducing skills  - Administer/offer alternative therapies  - Limit or eliminate stimulants  Outcome: Adequate for Discharge     Problem: SAFETY ADULT  Goal: Patient will remain free of falls  Description: INTERVENTIONS:  - Educate patient/family on patient safety including physical limitations  - Instruct patient to call for assistance with activity   - Consult OT/PT to assist with strengthening/mobility   - Keep Call bell within reach  - Keep bed low and locked with side rails adjusted as appropriate  - Keep care items and personal belongings within reach  - Initiate and maintain comfort rounds  - Make Fall Risk Sign visible to staff  - Apply yellow socks and bracelet for high fall risk patients  - Consider moving patient to room near nurses station  Outcome: Adequate for Discharge     Problem: Nutrition/Hydration-ADULT  Goal: Nutrient/Hydration intake appropriate for improving, restoring or maintaining nutritional needs  Description: Monitor and assess patient's nutrition/hydration status for malnutrition  Collaborate with interdisciplinary team and initiate plan and interventions as ordered  Monitor patient's weight and dietary intake as ordered or per policy  Utilize nutrition screening tool and intervene as necessary  Determine patient's food preferences and provide high-protein, high-caloric foods as appropriate  INTERVENTIONS:  - Monitor oral intake, urinary output, labs, and treatment plans  - Assess nutrition and hydration status and recommend course of action  - Evaluate amount of meals eaten  - Assist patient with eating if necessary   - Allow adequate time for meals  - Recommend/ encourage appropriate diets, oral nutritional supplements, and vitamin/mineral supplements  - Order, calculate, and assess calorie counts as needed  - Recommend, monitor, and adjust tube feedings and TPN/PPN based on assessed needs  - Assess need for intravenous fluids  - Provide specific nutrition/hydration education as appropriate  - Include patient/family/caregiver in decisions related to nutrition  Outcome: Adequate for Discharge    Pt expresses readiness for discharge

## 2022-12-20 NOTE — PLAN OF CARE
Problem: Ineffective Coping  Goal: Identifies healthy coping skills  Outcome: Progressing  Goal: Demonstrates healthy coping skills  Outcome: Progressing  Goal: Participates in unit activities  Description: Interventions:  - Provide therapeutic environment   - Provide required programming   - Redirect inappropriate behaviors   Outcome: Progressing     Problem: Risk for Self Injury/Neglect  Goal: Verbalize thoughts and feelings  Description: Interventions:  - Assess and re-assess patient's lethality and potential for self-injury  - Engage patient in 1:1 interactions, daily, for a minimum of 15 minutes  - Encourage patient to express feelings, fears, frustrations, hopes  - Establish rapport/trust with patient   Outcome: Progressing  Goal: Refrain from harming self  Description: Interventions:  - Monitor patient closely, per order  - Develop a trusting relationship  - Supervise medication ingestion, monitor effects and side effects   Outcome: Progressing  Goal: Complete daily ADLs, including personal hygiene independently, as able  Description: Interventions:  - Observe, teach, and assist patient with ADLS  - Monitor and promote a balance of rest/activity, with adequate nutrition and elimination  Outcome: Progressing     Problem: Depression  Goal: Verbalize thoughts and feelings  Description: Interventions:  - Assess and re-assess patient's level of risk   - Engage patient in 1:1 interactions, daily, for a minimum of 15 minutes   - Encourage patient to express feelings, fears, frustrations, hopes   Outcome: Progressing  Goal: Refrain from harming self  Description: Interventions:  - Monitor patient closely, per order   - Supervise medication ingestion, monitor effects and side effects   Outcome: Progressing  Goal: Refrain from isolation  Description: Interventions:  - Develop a trusting relationship   - Encourage socialization   Outcome: Progressing     Problem: Anxiety  Goal: Anxiety is at manageable level  Description: Interventions:  - Assess and monitor patient's anxiety level  - Monitor for signs and symptoms (heart palpitations, chest pain, shortness of breath, headaches, nausea, feeling jumpy, restlessness, irritable, apprehensive)  - Collaborate with interdisciplinary team and initiate plan and interventions as ordered    - [place] patient to unit/surroundings  - Explain treatment plan  - Encourage participation in care  - Encourage verbalization of concerns/fears  - Identify coping mechanisms  - Assist in developing anxiety-reducing skills  - Administer/offer alternative therapies  - Limit or eliminate stimulants  Outcome: Progressing     Problem: SAFETY ADULT  Goal: Patient will remain free of falls  Description: INTERVENTIONS:  - Educate patient/family on patient safety including physical limitations  - Instruct patient to call for assistance with activity   - Consult OT/PT to assist with strengthening/mobility   - Keep Call bell within reach  - Keep bed low and locked with side rails adjusted as appropriate  - Keep care items and personal belongings within reach  - Initiate and maintain comfort rounds  - Make Fall Risk Sign visible to staff  - Apply yellow socks and bracelet for high fall risk patients  - Consider moving patient to room near nurses station  Outcome: Progressing

## 2022-12-20 NOTE — NURSING NOTE
Patient observed lying down with eyes closed throughout shift  Pt  appears to be sleeping  Q 7 minute safety checks maintained

## 2022-12-20 NOTE — TREATMENT TEAM
12/20/22 6297   Activity/Group Checklist   Group Community meeting   Attendance Attended   Attendance Duration (min) 16-30   Interactions Interacted appropriately   Affect/Mood Calm; Other (Comment)  (tired, dozing off throughout)   Goals Achieved Other (Comment)  (identified goals for after DC)

## 2022-12-20 NOTE — NURSING NOTE
Pt is pleasant and cooperative on interaction today  Expresses readiness for discharge and states "I feel much better!"  Looking forward to returning back to group home and being home for the holiday    Denies SI

## 2022-12-23 NOTE — BH TRANSITION RECORD
Contact Information: If you have any questions, concerns, pended studies, tests and/or procedures, or emergencies regarding your inpatient behavioral health visit  Please contact [facility] unit [phone] and ask to speak to a , nurse or physician  A contact is available 24 hours/ 7 days a week at this number  Summary of Procedures Performed During your Stay:  Below is a list of major procedures performed during your hospital stay and a summary of results:  - No major procedures performed  Pending Studies (From admission, onward)    None        Please follow up on the above pending studies with your PCP and/or referring provider

## 2022-12-28 ENCOUNTER — HOSPITAL ENCOUNTER (EMERGENCY)
Facility: HOSPITAL | Age: 52
Discharge: HOME | End: 2022-12-29
Attending: EMERGENCY MEDICINE

## 2022-12-28 DIAGNOSIS — R45.851 SUICIDAL IDEATION: Primary | ICD-10-CM

## 2022-12-29 VITALS
HEIGHT: 66 IN | HEART RATE: 71 BPM | OXYGEN SATURATION: 98 % | TEMPERATURE: 97.5 F | SYSTOLIC BLOOD PRESSURE: 124 MMHG | DIASTOLIC BLOOD PRESSURE: 60 MMHG | RESPIRATION RATE: 18 BRPM | BODY MASS INDEX: 43.71 KG/M2 | WEIGHT: 272 LBS

## 2022-12-29 RX ORDER — ZIPRASIDONE HYDROCHLORIDE 20 MG/1
40 CAPSULE ORAL 2 TIMES DAILY WITH MEALS
Status: DISCONTINUED | OUTPATIENT
Start: 2022-12-29 | End: 2022-12-29 | Stop reason: HOSPADM

## 2022-12-29 RX ORDER — ATORVASTATIN CALCIUM 40 MG/1
40 TABLET, FILM COATED ORAL
Status: DISCONTINUED | OUTPATIENT
Start: 2022-12-29 | End: 2022-12-29 | Stop reason: HOSPADM

## 2022-12-29 RX ORDER — DIVALPROEX SODIUM 500 MG/1
500 TABLET, EXTENDED RELEASE ORAL DAILY
Status: DISCONTINUED | OUTPATIENT
Start: 2022-12-29 | End: 2022-12-29 | Stop reason: HOSPADM

## 2022-12-29 RX ORDER — PRAZOSIN HYDROCHLORIDE 1 MG/1
2 CAPSULE ORAL DAILY
Status: DISCONTINUED | OUTPATIENT
Start: 2022-12-29 | End: 2022-12-29 | Stop reason: HOSPADM

## 2022-12-29 RX ORDER — TRAZODONE HYDROCHLORIDE 50 MG/1
100 TABLET ORAL
Status: DISCONTINUED | OUTPATIENT
Start: 2022-12-29 | End: 2022-12-29 | Stop reason: HOSPADM

## 2022-12-29 RX ORDER — HALOPERIDOL 10 MG/1
10 TABLET ORAL DAILY
Status: DISCONTINUED | OUTPATIENT
Start: 2022-12-29 | End: 2022-12-29 | Stop reason: HOSPADM

## 2022-12-29 RX ORDER — ASPIRIN 81 MG/1
81 TABLET, CHEWABLE ORAL DAILY
Status: DISCONTINUED | OUTPATIENT
Start: 2022-12-29 | End: 2022-12-29 | Stop reason: HOSPADM

## 2022-12-29 RX ORDER — FLUOXETINE HYDROCHLORIDE 20 MG/1
40 CAPSULE ORAL DAILY
Status: DISCONTINUED | OUTPATIENT
Start: 2022-12-29 | End: 2022-12-29 | Stop reason: HOSPADM

## 2022-12-29 RX ORDER — LISINOPRIL 20 MG/1
20 TABLET ORAL DAILY
Status: DISCONTINUED | OUTPATIENT
Start: 2022-12-29 | End: 2022-12-29 | Stop reason: HOSPADM

## 2022-12-29 RX ORDER — MIRTAZAPINE 15 MG/1
45 TABLET, FILM COATED ORAL
Status: DISCONTINUED | OUTPATIENT
Start: 2022-12-29 | End: 2022-12-29 | Stop reason: HOSPADM

## 2022-12-29 NOTE — ED NOTES
Met w/ Pt post vitals, Pt made aware that she will not be going inpatient  Pt stated, "oh   ok" and proceeded to go back to sleep

## 2022-12-29 NOTE — ED NOTES
This worker attempted 2x additionally to reach [facility], [phone], no answer and same message as before

## 2022-12-29 NOTE — ED NOTES
This worker attempted 3x to call [phone] per [facility] protocol  3x a message stating "We're sorry, this call cannot be connected to the number dialed   Please try again "

## 2022-12-29 NOTE — ED NOTES
This worker attempted to contact the alternate # for [facility] Therapist, [phone]  As it is after hours no one is available

## 2022-12-29 NOTE — CONSULTS
TeleConsultation - [facility] 47 y o  female MRN: [id number]  Unit/Bed#: Jessica Antonio 02 Encounter: [id number]        REQUIRED DOCUMENTATION:     1  This service was provided via Telemedicine  2  Provider located at [facility]   3  TeleMed provider: Brook Burgos MD   4  Identify all parties in room with patient during tele consult: Patient   5  Patient was then informed that this was a Telemedicine visit and that the exam was being conducted confidentially over secure lines  My office door was closed  No one else was in the room  Patient acknowledged consent and understanding of privacy and security of the Telemedicine visit, and gave us permission to have the assistant stay in the room in order to assist with the history and to conduct the exam   I informed the patient that I have reviewed their record in Epic and presented the opportunity for them to ask any questions regarding the visit today  The patient agreed to participate  Discussed with Ceferino Washington, DO    Assessment/Plan     Active Problems:    * No active hospital problems  *    Assessment:  John Jara is a 48 y/o female with PMH significant for Borderline Personality Disorder, PTSD, Historical Diagnosis of Bipolar I, and HTN that presented for Suicidal Ideations  Patient presents in line with prior presentations as outlined in [facility] but have been unable to contact group home to establish plan of care  Recommend monitoring patient overnight and contacting [facility] in the AM as patient was recently discharged from [address] [place] [address]  Patient with extreme features of Borderline Personality Disorder with very limited stress tolerance and recommend against multiple repeated IP psychiatric admissions as this can be positively reinforcing  Suicide Risk Assessment: Acute: Low  Chronic: High, Non-Imminent (unchanged)   Primary risk factors are Prior SAs, Substance Use, Impulsivity  However, patient has access to and desire for care   She is future-oriented and cites children as reasons for living  Engaged in treatment  Borderline Personality Disorder    Treatment Plan:    Planned Medication Changes:    -None     Current Medications:         Risks / Benefits of Treatment:    Risks, benefits, and possible side effects of medications explained to patient and patient verbalizes understanding  Inpatient consult to Psychiatry  Consult performed by: Francis Nolan MD  Consult ordered by: Shelia Arango DO        Physician Requesting Consult: Shelia Arango DO  Principal Problem:<principal problem not specified>    Reason for Consult: Psych Evaluation       History of Present Illness      47 y o female Pt presents to the ED via ambulance with SI with no plan  [place] [address]  Pt was seen last week for a similar event  Pt was admitted to [facility] for inpatient treatment on 12/12/22 and discharged 12/20/22  Pt reported SI wanting to cut her throat due to stressors of feeling worthless, no one cares about her, and family loss in the the past Pt stated she is feeling depressed  Pt reported having command auditory hallucinations of her own voice telling her to harm herself by slicing her throat  Pt denies any HI or visual hallucinations or delusions  Pt denies any legal or substance abuse issues  Pt currently lives at [facility] and is currently under the supervision of her Psychiatrist Dr Donita Burrell and has a therapist named Ken Fajardo  Pt stated she currently feels safe at her group home which she has staff supervision  Pt stated she currently feels safe with the other residents  Pt feels she needs inpatient mental health and will sign a 201  Patient states that she has been feeling very suicidal for the last 2-3 days and that she feels very worthless  Patient states that her last IP stay was "ok" stating that it helps her out a lot  Patient states that she doesn't dislike her group home   Patient states that she has been feeling suicidal daily and that she plans to cut her throat  Patient states that she has a psychiatrist and sees him virtually  Patient states that she has attempted suicide 5-6x with last attempt last year  Patient reports that she needs another 2 weeks of IP psych as she was released to soon  Patient endorsing active SI with plan  Psychiatric Review Of Systems:    sleep: yes  appetite changes: no  weight changes: no  energy/anergy: no  interest/pleasure/anhedonia: no  somatic symptoms: no  anxiety/panic: no  otoniel: no  guilty/hopeless: yes  self injurious behavior/risky behavior: no    Historical Information     Past Psychiatric History:     Psychiatric Hospitalizations:   • Multiple past inpatient psychiatric admissions  Outpatient Treatment History:   • Yes  Suicide Attempts:   • Yes, multiple attempts by overdose  History of self-harm:   • Yes, history of self-abusive behavior  Violence History:   • no  Past Psychiatric medication trials: Unable to recall     Substance Abuse History: Denied        Family Psychiatric History:  Denied         Social History:     Living arrangement, social support: The patient lives in a group home under the supervision of unspecified  Occupational History: on permanent disability  Functioning Relationships: good support system    Other Pertinent History: None    Traumatic History:     Abuse: None  Other Traumatic Events: none    Past Medical History:   Diagnosis Date   • Anxiety    • Bipolar 1 disorder ([facility] )    • Bipolar affective disorder, depressed, severe ([place] [address] ) 10/10/2021   • Borderline personality disorder ([place] [address] )    • CAD (coronary artery disease)    • Cognitive impairment    • Depression    • Dyslipidemia    • GERD (gastroesophageal reflux disease)    • Hypertension    • Obesity    • Psychiatric disorder    • Psychiatric illness    • PTSD (post-traumatic stress disorder)    • Schizoaffective disorder ([place] [address] )    • Seizure [facility])        Medical Review Of Systems:    Review of Systems    Meds/Allergies     all current active meds have been reviewed  Allergies   Allergen Reactions   • Fish Oil - Food Allergy Other (See Comments)     unknown   • Fish-Derived Products - Food Allergy Hives       Objective     Vital signs in last 24 hours:  Temp:  [98 5 °F (36 9 °C)] 98 5 °F (36 9 °C)  HR:  [82] 82  Resp:  [18] 18  BP: (102)/(61) 102/61    No intake or output data in the 24 hours ending 12/28/22 6938    Mental Status Evaluation:    Appearance:  age appropriate   Behavior:  cooperative   Speech:  normal pitch and normal volume   Mood:  dysthymic   Affect:  labile   Language: naming objects   Thought Process:  perserverative   Associations intact associations   Thought Content:  normal   Perceptual Disturbances: None   Risk Potential: Suicidal Ideations with plan per HPI  Homicidal Ideations none  Potential for Aggression No   Sensorium:  person, place and time/date   Cognition:  recent and remote memory grossly intact   Consciousness:  alert    Attention: attention span and concentration were age appropriate   Intellect: within normal limits   Fund of Knowledge: awareness of current events: President    Insight:  limited   Judgment: limited   Muscle Strength:  Muscle Tone: normal NFT  normal   Gait/Station: normal gait/station   Motor Activity: no abnormal movements       Lab Results: I have personally reviewed all pertinent laboratory/tests results       Most Recent Labs:   Lab Results   Component Value Date    WBC 10 41 (H) 12/13/2022    RBC 4 17 12/13/2022    HGB 13 6 12/13/2022    HCT 41 0 12/13/2022     12/13/2022    RDW 13 3 12/13/2022    NEUTROABS 2 50 08/02/2022    SODIUM 138 12/13/2022    K 4 1 12/13/2022    CL 99 12/13/2022    CO2 26 12/13/2022    BUN 14 12/13/2022    CREATININE 0 70 12/13/2022    GLUC 130 12/13/2022    GLUF 130 (H) 12/13/2022    CALCIUM 9 0 12/13/2022    AST 18 12/13/2022    ALT 25 12/13/2022    ALKPHOS 69 12/13/2022    TP 8 0 12/13/2022 ALB 3 6 12/13/2022    TBILI 0 50 12/13/2022    CHOLESTEROL 177 12/13/2022    HDL 40 (L) 12/13/2022    TRIG 244 (H) 12/13/2022    LDLCALC 88 12/13/2022    NONHDLC 137 12/13/2022    VALPROICTOT 66 11/04/2022    UYP5YUMSYYFT 3 262 12/13/2022    PREGSERUM Negative 04/28/2022    RPR Non-Reactive 12/13/2022    HGBA1C 4 7 12/13/2022    EAG 88 12/13/2022       Imaging Studies: No results found  EKG/Pathology/Other Studies:   Lab Results   Component Value Date    VENTRATE 81 12/14/2022    ATRIALRATE 81 12/14/2022    PRINT 214 12/14/2022    QRSDINT 84 12/14/2022    QTINT 376 12/14/2022    QTCINT 436 12/14/2022    PAXIS 45 12/14/2022    QRSAXIS 35 12/14/2022    TWAVEAXIS 54 12/14/2022        Code Status: Prior  Advance Directive and Living Will:      Power of :    POLST:      Counseling / Coordination of Care: Total floor / unit time spent today 30 minutes  Greater than 50% of total time was spent with the patient and / or family counseling and / or coordination of care  A description of the counseling / coordination of care: Direct Patient Care, Chart Review, and Documentation

## 2022-12-29 NOTE — ED PROVIDER NOTES
History  Chief Complaint   Patient presents with   • Suicidal     Patient arrives via EMS from [facility], reports suicidal thoughts starting yesterday and continuing today  Does not have any plan  Was seen last week for a similar event  No HI reported  Patient with suicidal thoughts, never felt better while leaving [facility] 8 days ago  Thoughts of cutting herself in the throat with a knife  Hearing voices telling her to cut herself in the throat with a knife  Depressed from mother's death 4 years ago, no one cares about her, feels worthless  Prior to Admission Medications   Prescriptions Last Dose Informant Patient Reported? Taking? Cyanocobalamin (Vitamin B 12) 500 MCG TABS   No No   Sig: Take 1,000 mcg by mouth in the morning   FLUoxetine (PROzac) 40 MG capsule   No No   Sig: Take 1 capsule (40 mg total) by mouth daily Do not start before 2022  aspirin 81 mg chewable tablet   No No   Sig: Chew 1 tablet (81 mg total) daily   atorvastatin (LIPITOR) 40 mg tablet   No No   Sig: Take 1 tablet (40 mg total) by mouth daily with dinner   cholecalciferol (VITAMIN D3) 400 units tablet   No No   Sig: Take 1 tablet (400 Units total) by mouth daily   divalproex sodium (DEPAKOTE ER) 500 mg 24 hr tablet   No No   Sig: Take 4 tablets (2,000 mg total) by mouth daily at bedtime   fluticasone (FLONASE) 50 mcg/act nasal spray   No No   Si spray into each nostril daily Do not start before 2022     folic acid (FOLVITE) 1 mg tablet   No No   Sig: Take 1 tablet (1 mg total) by mouth daily   haloperidol (HALDOL) 10 mg tablet   No No   Sig: Take 1 tablet (10 mg total) by mouth 2 (two) times a day   lisinopril (ZESTRIL) 20 mg tablet   No No   Sig: Take 1 tablet (20 mg total) by mouth daily   mirtazapine (REMERON) 45 MG tablet   No No   Sig: Take 1 tablet (45 mg total) by mouth daily at bedtime   prazosin (MINIPRESS) 2 mg capsule   No No   Sig: Take 1 capsule (2 mg total) by mouth daily at bedtime   senna-docusate sodium (SENOKOT S) 8 6-50 mg per tablet   No No   Sig: Take 1 tablet by mouth 2 (two) times a day   traZODone (DESYREL) 100 mg tablet   No No   Sig: Take 1 tablet (100 mg total) by mouth daily at bedtime   ziprasidone (GEODON) 40 mg capsule   No No   Sig: Take 1 capsule (40 mg total) by mouth 2 (two) times a day with meals      Facility-Administered Medications: None       Past Medical History:   Diagnosis Date   • Anxiety    • Bipolar 1 disorder (HCC)    • Bipolar affective disorder, depressed, severe [facility] ) 10/10/2021   • Borderline personality disorder ([place] [id number] )    • CAD (coronary artery disease)    • Cognitive impairment    • Depression    • Dyslipidemia    • GERD (gastroesophageal reflux disease)    • Hypertension    • Obesity    • Psychiatric disorder    • Psychiatric illness    • PTSD (post-traumatic stress disorder)    • Schizoaffective disorder ([facility] )    • Seizure ([facility] )        Past Surgical History:   Procedure Laterality Date   • APPENDECTOMY      PATIENT DENIES HAVING APPENDIX REMOVED   • CHOLECYSTECTOMY     • FOOT SURGERY Right        Family History   Problem Relation Age of Onset   • Kidney cancer Mother    • Cerebral aneurysm Father      I have reviewed and agree with the history as documented  E-Cigarette/Vaping   • E-Cigarette Use Never User      E-Cigarette/Vaping Substances   • Nicotine No    • THC No    • CBD No    • Flavoring No    • Other No    • Unknown No      Social History     Tobacco Use   • Smoking status: Former   • Smokeless tobacco: Never   • Tobacco comments:     Pt stated "smokes when stressed"   Vaping Use   • Vaping Use: Never used   Substance Use Topics   • Alcohol use: Yes     Comment: occasionally   • Drug use: Not Currently       Review of Systems   Constitutional: Negative for chills and fever  HENT: Negative for rhinorrhea and sore throat  Respiratory: Negative for shortness of breath  Cardiovascular: Negative for chest pain  Gastrointestinal: Negative for constipation, diarrhea, nausea and vomiting  Genitourinary: Negative for dysuria and frequency  Skin: Negative for rash  All other systems reviewed and are negative  Physical Exam  Physical Exam  Vitals and nursing note reviewed  Constitutional:       Appearance: She is well-developed  HENT:      Head: Normocephalic and atraumatic  Right Ear: External ear normal       Left Ear: External ear normal       Nose: Nose normal    Eyes:      Conjunctiva/sclera: Conjunctivae normal       Pupils: Pupils are equal, round, and reactive to light  Cardiovascular:      Rate and Rhythm: Normal rate and regular rhythm  Heart sounds: Normal heart sounds  Pulmonary:      Effort: Pulmonary effort is normal  No respiratory distress  Breath sounds: Normal breath sounds  No wheezing  Abdominal:      General: Bowel sounds are normal  There is no distension  Palpations: Abdomen is soft  Tenderness: There is no abdominal tenderness  Musculoskeletal:         General: No deformity  Normal range of motion  Cervical back: Normal range of motion and neck supple  No spinous process tenderness  Skin:     General: Skin is warm and dry  Findings: No rash  Neurological:      General: No focal deficit present  Mental Status: She is alert and oriented to person, place, and time  GCS: GCS eye subscore is 4  GCS verbal subscore is 5  GCS motor subscore is 6  Sensory: No sensory deficit  Psychiatric:         Mood and Affect: Mood normal          Speech: Speech normal          Behavior: Behavior normal  Behavior is cooperative  Thought Content: Thought content includes suicidal ideation           Vital Signs  ED Triage Vitals [12/28/22 2002]   Temperature Pulse Respirations Blood Pressure SpO2   98 5 °F (36 9 °C) 82 18 102/61 94 %      Temp Source Heart Rate Source Patient Position - Orthostatic VS BP Location FiO2 (%)   Oral Monitor Sitting Right arm --      Pain Score       No Pain           Vitals:    12/28/22 2002   BP: 102/61   Pulse: 82   Patient Position - Orthostatic VS: Sitting         Visual Acuity      ED Medications  Medications   aspirin chewable tablet 81 mg (has no administration in time range)   atorvastatin (LIPITOR) tablet 40 mg (has no administration in time range)   divalproex sodium (DEPAKOTE ER) 24 hr tablet 500 mg (has no administration in time range)   FLUoxetine (PROzac) capsule 40 mg (has no administration in time range)   haloperidol (HALDOL) tablet 10 mg (has no administration in time range)   lisinopril (ZESTRIL) tablet 20 mg (has no administration in time range)   mirtazapine (REMERON) tablet 45 mg (has no administration in time range)   prazosin (MINIPRESS) capsule 2 mg (has no administration in time range)   traZODone (DESYREL) tablet 100 mg (has no administration in time range)   ziprasidone (GEODON) capsule 40 mg (has no administration in time range)       Diagnostic Studies  Results Reviewed     Procedure Component Value Units Date/Time    FLU/RSV/COVID - if FLU/RSV clinically relevant [[id number]]  (Normal) Collected: 12/28/22 2009    Lab Status: Final result Specimen: Nares from Nose Updated: 12/28/22 2102     SARS-CoV-2 Negative     INFLUENZA A PCR Negative     INFLUENZA B PCR Negative     RSV PCR Negative    Narrative:      FOR PEDIATRIC PATIENTS - copy/paste COVID Guidelines URL to browser: [email or web address]    SARS-CoV-2 assay is a Nucleic Acid Amplification assay intended for the  qualitative detection of nucleic acid from SARS-CoV-2 in nasopharyngeal  swabs  Results are for the presumptive identification of SARS-CoV-2 RNA  Positive results are indicative of infection with SARS-CoV-2, the virus  causing COVID-19, but do not rule out bacterial infection or co-infection  with other viruses   Laboratories within the [place] and its  territories are required to report all positive results to the appropriate  public health authorities  Negative results do not preclude SARS-CoV-2  infection and should not be used as the sole basis for treatment or other  patient management decisions  Negative results must be combined with  clinical observations, patient history, and epidemiological information  This test has not been FDA cleared or approved  This test has been authorized by FDA under an Emergency Use Authorization  (EUA)  This test is only authorized for the duration of time the  declaration that circumstances exist justifying the authorization of the  emergency use of an in vitro diagnostic tests for detection of SARS-CoV-2  virus and/or diagnosis of COVID-19 infection under section 564(b)(1) of  the Act, 21 U  S C  100HFX-7(F)(6), unless the authorization is terminated  or revoked sooner  The test has been validated but independent review by FDA  and CLIA is pending  Test performed using [employer] GeneXpert: This RT-PCR assay targets N2,  a region unique to SARS-CoV-2  A conserved region in the E-gene was chosen  for pan-Sarbecovirus detection which includes SARS-CoV-2  According to CMS-2020-01-R, this platform meets the definition of high-throughput technology  Rapid drug screen, urine [[id number]]  (Normal) Collected: 12/28/22 2009    Lab Status: Final result Specimen: Urine, Clean Catch Updated: 12/28/22 2030     Amph/Meth UR Negative     Barbiturate Ur Negative     Benzodiazepine Urine Negative     Cocaine Urine Negative     Methadone Urine Negative     Opiate Urine Negative     PCP Ur Negative     THC Urine Negative     Oxycodone Urine Negative    Narrative:      FOR MEDICAL PURPOSES ONLY  IF CONFIRMATION NEEDED PLEASE CONTACT THE LAB WITHIN 5 DAYS      Drug Screen Cutoff Levels:  AMPHETAMINE/METHAMPHETAMINES  1000 ng/mL  BARBITURATES     200 ng/mL  BENZODIAZEPINES     200 ng/mL  COCAINE      300 ng/mL  METHADONE      300 ng/mL  OPIATES      300 ng/mL  PHENCYCLIDINE     25 ng/mL  THC       50 ng/mL  OXYCODONE      100 ng/mL    POCT alcohol breath test [[id number]]  (Normal) Resulted: 12/28/22 2027    Lab Status: Final result Updated: 12/28/22 2027     EXTBreath Alcohol 0 000                 No orders to display              Procedures  Procedures         ED Course     chronic suicidal ideation - psych evaluated patient - will discharge back to [facility]                                        MDM  Number of Diagnoses or Management Options  Suicidal ideation: established and worsening     Amount and/or Complexity of Data Reviewed  Clinical lab tests: ordered and reviewed  Obtain history from someone other than the patient: yes  Discuss the patient with other providers: yes        Disposition  Final diagnoses:   Suicidal ideation - chronic     Time reflects when diagnosis was documented in both MDM as applicable and the Disposition within this note     Time User Action Codes Description Comment    12/28/2022 10:11 PM Dulce Kasper Add [N57 [id number]] Suicidal ideation     12/29/2022 12:44 AM Dulce Kasper Modify [O67 [id number]] Suicidal ideation chronic      ED Disposition     ED Disposition   Discharge    Condition   Stable    Date/Time   Thu Dec 29, 2022  1:10 AM    Comment              Follow-up Information     Follow up With Specialties Details Why [address] [place] [address] Schedule an appointment as soon as possible for a visit   [address]  [phone]            Patient's Medications   Discharge Prescriptions    No medications on file       No discharge procedures on file      PDMP Review       Value Time User    PDMP Reviewed  Yes 5/6/2022 10:38 AM Kandi Dockery MD          ED Provider  Electronically Signed by           Tano Syed DO  12/29/22 0123

## 2022-12-29 NOTE — ED NOTES
47 y o female Pt presents to the ED via ambulance with SI with no plan  Pt is identified as a [facility]  Pt was seen last week for a similar event  Pt was admitted to [facility] for inpatient treatment on 12/12/22 and discharged 12/20/22  Pt reported SI wanting to cut her throat due to stressors of feeling worthless, no one cares about her, and family loss in the the past Pt stated she is feeling depressed  Pt reported having command auditory hallucinations of her own voice telling her to harm herself by slicing her throat  Pt denies any HI or visual hallucinations or delusions  Pt denies any legal or substance abuse issues  Pt currently lives at [facility] and is currently under the supervision of her Psychiatrist Dr Agapito Vallejo and has a therapist named Mone Winkler  Pt stated she currently feels safe at her group [place] which she has staff supervision  Pt stated she currently feels safe with the other residents  Pt feels she needs inpatient mental health and will sign a 201

## 2022-12-29 NOTE — ED NOTES
This worker again attempted to call [phone]  [facility], [phone], no answer and same message as before

## 2023-01-10 NOTE — DISCHARGE INSTR - OTHER ORDERS
Catheter located in the coronary sinus. If you are experiencing a mental health emergency, you may call the [phone] 24 hours a day, 7 days per week at [phone]  In [facility], call [phone]  When you need someone to listen, the [facility] is available for 16 hours a day, 7 days a week, from the time of 7-10am and 2pm-2am   It is not available from the hours of 2am-6am and 10am-2pm  A representative can be reached at [phone]  - - -

## 2023-01-26 ENCOUNTER — HOSPITAL ENCOUNTER (EMERGENCY)
Facility: HOSPITAL | Age: 53
Discharge: HOME | End: 2023-01-26
Attending: EMERGENCY MEDICINE

## 2023-01-26 VITALS
OXYGEN SATURATION: 94 % | HEIGHT: 66 IN | SYSTOLIC BLOOD PRESSURE: 131 MMHG | TEMPERATURE: 97.8 F | WEIGHT: 272 LBS | BODY MASS INDEX: 43.71 KG/M2 | HEART RATE: 102 BPM | DIASTOLIC BLOOD PRESSURE: 78 MMHG | RESPIRATION RATE: 20 BRPM

## 2023-01-26 DIAGNOSIS — R07.89 ATYPICAL CHEST PAIN: Primary | ICD-10-CM

## 2023-01-26 LAB
ALBUMIN SERPL BCP-MCNC: 3.9 G/DL (ref 3.5–5)
ALP SERPL-CCNC: 71 U/L (ref 46–116)
ALT SERPL W P-5'-P-CCNC: 30 U/L (ref 12–78)
ANION GAP SERPL CALCULATED.3IONS-SCNC: 8 MMOL/L (ref 4–13)
APTT PPP: 25 SECONDS (ref 23–37)
AST SERPL W P-5'-P-CCNC: 18 U/L (ref 5–45)
ATRIAL RATE: 93 BPM
BASOPHILS # BLD AUTO: 0.03 THOUSANDS/ÂΜL (ref 0–0.1)
BASOPHILS NFR BLD AUTO: 0 % (ref 0–1)
BILIRUB SERPL-MCNC: 0.4 MG/DL (ref 0.2–1)
BUN SERPL-MCNC: 14 MG/DL (ref 5–25)
CALCIUM SERPL-MCNC: 8.9 MG/DL (ref 8.3–10.1)
CARDIAC TROPONIN I PNL SERPL HS: <2 NG/L
CHLORIDE SERPL-SCNC: 103 MMOL/L (ref 96–108)
CO2 SERPL-SCNC: 28 MMOL/L (ref 21–32)
CREAT SERPL-MCNC: 0.72 MG/DL (ref 0.6–1.3)
D DIMER PPP FEU-MCNC: 0.3 UG/ML FEU
EOSINOPHIL # BLD AUTO: 0.11 THOUSAND/ÂΜL (ref 0–0.61)
EOSINOPHIL NFR BLD AUTO: 2 % (ref 0–6)
ERYTHROCYTE [DISTWIDTH] IN BLOOD BY AUTOMATED COUNT: 12.8 % (ref 11.6–15.1)
GFR SERPL CREATININE-BSD FRML MDRD: 97 ML/MIN/1.73SQ M
GLUCOSE SERPL-MCNC: 101 MG/DL (ref 65–140)
HCT VFR BLD AUTO: 42.8 % (ref 34.8–46.1)
HGB BLD-MCNC: 14.5 G/DL (ref 11.5–15.4)
IMM GRANULOCYTES # BLD AUTO: 0.06 THOUSAND/UL (ref 0–0.2)
IMM GRANULOCYTES NFR BLD AUTO: 1 % (ref 0–2)
INR PPP: 0.97 (ref 0.84–1.19)
LIPASE SERPL-CCNC: 55 U/L (ref 73–393)
LYMPHOCYTES # BLD AUTO: 2.62 THOUSANDS/ÂΜL (ref 0.6–4.47)
LYMPHOCYTES NFR BLD AUTO: 35 % (ref 14–44)
MCH RBC QN AUTO: 32.6 PG (ref 26.8–34.3)
MCHC RBC AUTO-ENTMCNC: 33.9 G/DL (ref 31.4–37.4)
MCV RBC AUTO: 96 FL (ref 82–98)
MONOCYTES # BLD AUTO: 0.71 THOUSAND/ÂΜL (ref 0.17–1.22)
MONOCYTES NFR BLD AUTO: 10 % (ref 4–12)
NEUTROPHILS # BLD AUTO: 3.87 THOUSANDS/ÂΜL (ref 1.85–7.62)
NEUTS SEG NFR BLD AUTO: 52 % (ref 43–75)
NRBC BLD AUTO-RTO: 0 /100 WBCS
P AXIS: 42 DEGREES
PLATELET # BLD AUTO: 186 THOUSANDS/UL (ref 149–390)
PMV BLD AUTO: 9.6 FL (ref 8.9–12.7)
POTASSIUM SERPL-SCNC: 4 MMOL/L (ref 3.5–5.3)
PR INTERVAL: 214 MS
PROT SERPL-MCNC: 8.2 G/DL (ref 6.4–8.4)
PROTHROMBIN TIME: 13.5 SECONDS (ref 11.6–14.5)
QRS AXIS: 43 DEGREES
QRSD INTERVAL: 78 MS
QT INTERVAL: 334 MS
QTC INTERVAL: 415 MS
RBC # BLD AUTO: 4.45 MILLION/UL (ref 3.81–5.12)
SODIUM SERPL-SCNC: 139 MMOL/L (ref 135–147)
T WAVE AXIS: 119 DEGREES
VENTRICULAR RATE: 93 BPM
WBC # BLD AUTO: 7.4 THOUSAND/UL (ref 4.31–10.16)

## 2023-01-26 RX ORDER — PANTOPRAZOLE SODIUM 40 MG/10ML
40 INJECTION, POWDER, LYOPHILIZED, FOR SOLUTION INTRAVENOUS ONCE
Status: COMPLETED | OUTPATIENT
Start: 2023-01-26 | End: 2023-01-26

## 2023-01-26 RX ORDER — PANTOPRAZOLE SODIUM 20 MG/1
20 TABLET, DELAYED RELEASE ORAL DAILY
Qty: 20 TABLET | Refills: 0 | Status: SHIPPED | OUTPATIENT
Start: 2023-01-26

## 2023-01-26 RX ORDER — KETOROLAC TROMETHAMINE 30 MG/ML
15 INJECTION, SOLUTION INTRAMUSCULAR; INTRAVENOUS ONCE
Status: DISCONTINUED | OUTPATIENT
Start: 2023-01-26 | End: 2023-01-26

## 2023-01-26 RX ORDER — ONDANSETRON 2 MG/ML
4 INJECTION INTRAMUSCULAR; INTRAVENOUS ONCE
Status: COMPLETED | OUTPATIENT
Start: 2023-01-26 | End: 2023-01-26

## 2023-01-26 RX ORDER — ONDANSETRON 4 MG/1
4 TABLET, ORALLY DISINTEGRATING ORAL EVERY 6 HOURS PRN
Qty: 20 TABLET | Refills: 0 | Status: SHIPPED | OUTPATIENT
Start: 2023-01-26

## 2023-01-26 RX ORDER — MAGNESIUM HYDROXIDE/ALUMINUM HYDROXICE/SIMETHICONE 120; 1200; 1200 MG/30ML; MG/30ML; MG/30ML
30 SUSPENSION ORAL ONCE
Status: COMPLETED | OUTPATIENT
Start: 2023-01-26 | End: 2023-01-26

## 2023-01-26 RX ADMIN — SODIUM CHLORIDE 1000 ML: 0.9 INJECTION, SOLUTION INTRAVENOUS at 02:06

## 2023-01-26 RX ADMIN — PANTOPRAZOLE SODIUM 40 MG: 40 INJECTION, POWDER, FOR SOLUTION INTRAVENOUS at 01:49

## 2023-01-26 RX ADMIN — ALUMINUM HYDROXIDE, MAGNESIUM HYDROXIDE, AND DIMETHICONE 30 ML: 200; 20; 200 SUSPENSION ORAL at 01:49

## 2023-01-26 RX ADMIN — ONDANSETRON 4 MG: 2 INJECTION INTRAMUSCULAR; INTRAVENOUS at 01:49

## 2023-01-26 NOTE — ED PROVIDER NOTES
History  Chief Complaint   Patient presents with   • Chest Pain     Pt c/o of chest pain x 2 hours  58-year-old female past medical history of anxiety, bipolar, CAD, hyperlipidemia, GERD, hypertension presents for evaluation of epigastric chest pain radiating into her midsternal region, lightheadedness especially when she stands up, nausea, and shortness of breath  Patient states the chest pain is worse when she takes a deep breath  Patient is taken prior to arrival no similar history in the past   She is minimally tachycardic heart rate of 105 with saturation 94% on room air no acute respiratory distress  On chart review patient is a frequent user of the healthcare system namely for psychiatric complaints however currently denies any suicidal homicidal ideation  Prior to Admission Medications   Prescriptions Last Dose Informant Patient Reported? Taking? Cyanocobalamin (Vitamin B 12) 500 MCG TABS   No No   Sig: Take 1,000 mcg by mouth in the morning   FLUoxetine (PROzac) 40 MG capsule   No No   Sig: Take 1 capsule (40 mg total) by mouth daily Do not start before 2022  aspirin 81 mg chewable tablet   No No   Sig: Chew 1 tablet (81 mg total) daily   atorvastatin (LIPITOR) 40 mg tablet   No No   Sig: Take 1 tablet (40 mg total) by mouth daily with dinner   cholecalciferol (VITAMIN D3) 400 units tablet   No No   Sig: Take 1 tablet (400 Units total) by mouth daily   divalproex sodium (DEPAKOTE ER) 500 mg 24 hr tablet   No No   Sig: Take 4 tablets (2,000 mg total) by mouth daily at bedtime   fluticasone (FLONASE) 50 mcg/act nasal spray   No No   Si spray into each nostril daily Do not start before 2022     folic acid (FOLVITE) 1 mg tablet   No No   Sig: Take 1 tablet (1 mg total) by mouth daily   haloperidol (HALDOL) 10 mg tablet   No No   Sig: Take 1 tablet (10 mg total) by mouth 2 (two) times a day   lisinopril (ZESTRIL) 20 mg tablet   No No   Sig: Take 1 tablet (20 mg total) by mouth daily   mirtazapine (REMERON) 45 MG tablet   No No   Sig: Take 1 tablet (45 mg total) by mouth daily at bedtime   prazosin (MINIPRESS) 2 mg capsule   No No   Sig: Take 1 capsule (2 mg total) by mouth daily at bedtime   senna-docusate sodium (SENOKOT S) 8 6-50 mg per tablet   No No   Sig: Take 1 tablet by mouth 2 (two) times a day   traZODone (DESYREL) 100 mg tablet   No No   Sig: Take 1 tablet (100 mg total) by mouth daily at bedtime   ziprasidone (GEODON) 40 mg capsule   No No   Sig: Take 1 capsule (40 mg total) by mouth 2 (two) times a day with meals      Facility-Administered Medications: None       Past Medical History:   Diagnosis Date   • Anxiety    • Bipolar 1 disorder (HCC)    • Bipolar affective disorder, depressed, severe ([facility] ) 10/10/2021   • Borderline personality disorder ([place] [address] )    • CAD (coronary artery disease)    • Cognitive impairment    • Depression    • Dyslipidemia    • GERD (gastroesophageal reflux disease)    • Hypertension    • Obesity    • Psychiatric disorder    • Psychiatric illness    • PTSD (post-traumatic stress disorder)    • Schizoaffective disorder (HCC)    • Seizure ([facility] )        Past Surgical History:   Procedure Laterality Date   • APPENDECTOMY      PATIENT DENIES HAVING APPENDIX REMOVED   • CHOLECYSTECTOMY     • FOOT SURGERY Right        Family History   Problem Relation Age of Onset   • Kidney cancer Mother    • Cerebral aneurysm Father      I have reviewed and agree with the history as documented      E-Cigarette/Vaping   • E-Cigarette Use Never User      E-Cigarette/Vaping Substances   • Nicotine No    • THC No    • CBD No    • Flavoring No    • Other No    • Unknown No      Social History     Tobacco Use   • Smoking status: Former   • Smokeless tobacco: Never   • Tobacco comments:     Pt stated "smokes when stressed"   Vaping Use   • Vaping Use: Never used   Substance Use Topics   • Alcohol use: Yes     Comment: occasionally   • Drug use: Not Currently       Review of Systems    Physical Exam  Physical Exam  Vitals and nursing note reviewed  Constitutional:       Appearance: She is well-developed  HENT:      Head: Normocephalic and atraumatic  Eyes:      Pupils: Pupils are equal, round, and reactive to light  Cardiovascular:      Rate and Rhythm: Normal rate and regular rhythm  Heart sounds: No murmur heard  No friction rub  No gallop  Pulmonary:      Effort: Pulmonary effort is normal       Breath sounds: No wheezing or rales  Chest:      Chest wall: No tenderness  Abdominal:      General: There is no distension  Palpations: Abdomen is soft  There is no mass  Tenderness: There is no abdominal tenderness  There is no guarding or rebound  Musculoskeletal:      Cervical back: Normal range of motion and neck supple  Skin:     General: Skin is warm and dry  Neurological:      Mental Status: She is alert and oriented to person, place, and time           Vital Signs  ED Triage Vitals   Temperature Pulse Respirations Blood Pressure SpO2   01/26/23 0105 01/26/23 0105 01/26/23 0105 01/26/23 0105 01/26/23 0105   97 8 °F (36 6 °C) 105 18 126/85 94 %      Temp Source Heart Rate Source Patient Position - Orthostatic VS BP Location FiO2 (%)   01/26/23 0105 01/26/23 0158 01/26/23 0105 01/26/23 0105 --   Temporal Monitor Lying Right arm       Pain Score       --                  Vitals:    01/26/23 0130 01/26/23 0154 01/26/23 0156 01/26/23 0158   BP: 107/76 115/71 131/78    Pulse: 92 85 95 102   Patient Position - Orthostatic VS:  Lying - Orthostatic VS Sitting - Orthostatic VS Standing - Orthostatic VS         Visual Acuity  Visual Acuity    Flowsheet Row Most Recent Value   L Pupil Size (mm) 3   R Pupil Size (mm) 3          ED Medications  Medications   pantoprazole (PROTONIX) injection 40 mg (40 mg Intravenous Given 1/26/23 0149)   aluminum-magnesium hydroxide-simethicone (MYLANTA) oral suspension 30 mL (30 mL Oral Given 1/26/23 0149)   ondansetron [facility] injection 4 mg (4 mg Intravenous Given 1/26/23 0149)   sodium chloride 0 9 % bolus 1,000 mL (0 mL Intravenous Stopped 1/26/23 0237)       Diagnostic Studies  Results Reviewed     Procedure Component Value Units Date/Time    HS Troponin 0hr (reflex protocol) [[id number]]  (Normal) Collected: 01/26/23 0137    Lab Status: Final result Specimen: Blood from Arm, Right Updated: 01/26/23 0207     hs TnI 0hr <2 ng/L     D-dimer, quantitative [[id number]]  (Normal) Collected: 01/26/23 0137    Lab Status: Final result Specimen: Blood from Arm, Right Updated: 01/26/23 0206     D-Dimer, Quant 0 30 ug/ml FEU     Narrative: In the evaluation for possible pulmonary embolism, in the appropriate (Well's Score of 4 or less) patient, the age adjusted d-dimer cutoff for this patient can be calculated as:    Age x 0 01 (in ug/mL) for Age-adjusted D-dimer exclusion threshold for a patient over 50 years      Protime-INR [[id number]]  (Normal) Collected: 01/26/23 0137    Lab Status: Final result Specimen: Blood from Arm, Right Updated: 01/26/23 0203     Protime 13 5 seconds      INR 0 97    APTT [[id number]]  (Normal) Collected: 01/26/23 0137    Lab Status: Final result Specimen: Blood from Arm, Right Updated: 01/26/23 0203     PTT 25 seconds     Comprehensive metabolic panel [[id number]] Collected: 01/26/23 0137    Lab Status: Final result Specimen: Blood from Arm, Right Updated: 01/26/23 0200     Sodium 139 mmol/L      Potassium 4 0 mmol/L      Chloride 103 mmol/L      CO2 28 mmol/L      ANION GAP 8 mmol/L      BUN 14 mg/dL      Creatinine 0 72 mg/dL      Glucose 101 mg/dL      Calcium 8 9 mg/dL      AST 18 U/L      ALT 30 U/L      Alkaline Phosphatase 71 U/L      Total Protein 8 2 g/dL      Albumin 3 9 g/dL      Total Bilirubin 0 40 mg/dL      eGFR 97 ml/min/1 73sq m     Narrative:      [facility] guidelines for Chronic Kidney Disease (CKD):   •  Stage 1 with normal or high GFR (GFR > 90 mL/min/1 73 square meters)  •  Stage 2 Mild CKD (GFR = 60-89 mL/min/1 73 square meters)  •  Stage 3A Moderate CKD (GFR = 45-59 mL/min/1 73 square meters)  •  Stage 3B Moderate CKD (GFR = 30-44 mL/min/1 73 square meters)  •  Stage 4 Severe CKD (GFR = 15-29 mL/min/1 73 square meters)  •  Stage 5 End Stage CKD (GFR <15 mL/min/1 73 square meters)  Note: GFR calculation is accurate only with a steady state creatinine    Lipase [[id number]]  (Abnormal) Collected: 01/26/23 0137    Lab Status: Final result Specimen: Blood from Arm, Right Updated: 01/26/23 0200     Lipase 55 u/L     CBC and differential [[id number]] Collected: 01/26/23 0137    Lab Status: Final result Specimen: Blood from Arm, Right Updated: 01/26/23 0145     WBC 7 40 Thousand/uL      RBC 4 45 Million/uL      Hemoglobin 14 5 g/dL      Hematocrit 42 8 %      MCV 96 fL      MCH 32 6 pg      MCHC 33 9 g/dL      RDW 12 8 %      MPV 9 6 fL      Platelets 955 Thousands/uL      nRBC 0 /100 WBCs      Neutrophils Relative 52 %      Immat GRANS % 1 %      Lymphocytes Relative 35 %      Monocytes Relative 10 %      Eosinophils Relative 2 %      Basophils Relative 0 %      Neutrophils Absolute 3 87 Thousands/µL      Immature Grans Absolute 0 06 Thousand/uL      Lymphocytes Absolute 2 62 Thousands/µL      Monocytes Absolute 0 71 Thousand/µL      Eosinophils Absolute 0 11 Thousand/µL      Basophils Absolute 0 03 Thousands/µL                  No orders to display              Procedures  Procedures         ED Course  ED Course as of 01/26/23 0351   Thu Jan 26, 2023   0118 Procedure Note: EKG  Date/Time: 01/26/23 1:18 AM   Performed by: Sylwia Nolasco  Authorized by: Sylwia Nolasco  Indications / Diagnosis: CP  ECG reviewed by me, the ED Provider: yes   The EKG demonstrates:  Rhythm: normal sinus  Intervals: normal intervals  Axis: normal axis  QRS/Blocks: normal QRS  ST Changes: No acute ST Changes, no STD/MYESHA                                                   Medical Decision Making  27-year-old female with epigastric chest pain radiating to her substernal region,   She did have Pasta salad for dinner this evening however has a history of cholecystectomy, so less likely biliary obstruction causing her symptomology, EKG without any acute ischemic changes, will evaluate with lab work to further assess for likelihood of ACS, gastritis, pancreatitis, will obtain D-dimer given patient's mild tachycardia and borderline hypoxia in setting of chest pain and shortness of breath  Will reevaluate after symptomatic treatment for possible gastritis    Amount and/or Complexity of Data Reviewed  Labs: ordered  Risk  OTC drugs  Prescription drug management  Disposition  Final diagnoses:   Atypical chest pain     Time reflects when diagnosis was documented in both MDM as applicable and the Disposition within this note     Time User Action Codes Description Comment    1/26/2023  2:23 AM Prudence Víctor Puga [R07 [id number]] Atypical chest pain       ED Disposition     ED Disposition   Discharge    Condition   Stable    Date/Time   Thu Jan 26, 2023  2:23 AM    Comment   Indra Rodney discharge to home/self care                 Follow-up Information     Follow up With Specialties Details Why Contact Info Additional Information    Hanh Marcano MD Internal Medicine Schedule an appointment as soon as possible for a visit   [address] [facility],Third Floor  [facility] [address] [facility]  [address] [facility],[address] Emergency Department Emergency Medicine  If symptoms worsen [address] [place],[address] [address]  [address] [facility], [address] [facility] , [address]          Discharge Medication List as of 1/26/2023  2:23 AM      START taking these medications    Details   ondansetron (Zofran ODT) 4 mg disintegrating tablet Take 1 tablet (4 mg total) by mouth every 6 (six) hours as needed for nausea or vomiting, Starting Thu 1/26/2023, Normal      pantoprazole (PROTONIX) 20 mg tablet Take 1 tablet (20 mg total) by mouth daily, Starting Thu 1/26/2023, Normal         CONTINUE these medications which have NOT CHANGED    Details   aspirin 81 mg chewable tablet Chew 1 tablet (81 mg total) daily, Starting Mon 12/19/2022, Until Wed 1/18/2023, Normal      atorvastatin (LIPITOR) 40 mg tablet Take 1 tablet (40 mg total) by mouth daily with dinner, Starting Mon 12/19/2022, Until Wed 1/18/2023, Normal      cholecalciferol (VITAMIN D3) 400 units tablet Take 1 tablet (400 Units total) by mouth daily, Starting Mon 12/19/2022, Until Wed 1/18/2023, Normal      Cyanocobalamin (Vitamin B 12) 500 MCG TABS Take 1,000 mcg by mouth in the morning, Starting Mon 12/19/2022, Normal      divalproex sodium (DEPAKOTE ER) 500 mg 24 hr tablet Take 4 tablets (2,000 mg total) by mouth daily at bedtime, Starting Mon 12/19/2022, Until Wed 1/18/2023, Normal      FLUoxetine (PROzac) 40 MG capsule Take 1 capsule (40 mg total) by mouth daily Do not start before December 20, 2022 , Starting Tue 12/20/2022, Until Thu 1/19/2023, Normal      fluticasone (FLONASE) 50 mcg/act nasal spray 1 spray into each nostril daily Do not start before December 20, 2022 , Starting Tue 12/99/2387, Normal      folic acid (FOLVITE) 1 mg tablet Take 1 tablet (1 mg total) by mouth daily, Starting Mon 12/19/2022, Until Wed 1/18/2023, Normal      haloperidol (HALDOL) 10 mg tablet Take 1 tablet (10 mg total) by mouth 2 (two) times a day, Starting Mon 12/19/2022, Until Wed 1/18/2023, Normal      lisinopril (ZESTRIL) 20 mg tablet Take 1 tablet (20 mg total) by mouth daily, Starting Mon 12/19/2022, Until Wed 1/18/2023, Normal      mirtazapine (REMERON) 45 MG tablet Take 1 tablet (45 mg total) by mouth daily at bedtime, Starting Mon 12/19/2022, Until Wed 1/18/2023, Normal      prazosin (MINIPRESS) 2 mg capsule Take 1 capsule (2 mg total) by mouth daily at bedtime, Starting Mon 12/19/2022, Until Wed 1/18/2023, Normal      senna-docusate sodium (SENOKOT S) 8 6-50 mg per tablet Take 1 tablet by mouth 2 (two) times a day, Starting Mon 12/19/2022, Until Wed 1/18/2023, Normal      traZODone (DESYREL) 100 mg tablet Take 1 tablet (100 mg total) by mouth daily at bedtime, Starting Mon 12/19/2022, Until Wed 1/18/2023, Normal      ziprasidone (GEODON) 40 mg capsule Take 1 capsule (40 mg total) by mouth 2 (two) times a day with meals, Starting Mon 12/19/2022, Until Wed 1/18/2023, Normal             No discharge procedures on file      PDMP Review       Value Time User    PDMP Reviewed  Yes 5/6/2022 10:38 AM Jack Burr MD          ED Provider  Electronically Signed by           Shyann Dennis DO  01/26/23 8514 Initial Size Of Lesion: 0.9

## 2023-01-26 NOTE — ED NOTES
RN unable to complete orthostatic vital signs due to patient not able to stand indepedently  Patient reporting increased dizziness when standing and has unsteady gait  RN to notify Dr Yvan So RN  01/26/23 6879

## 2023-01-27 ENCOUNTER — HOSPITAL ENCOUNTER (EMERGENCY)
Facility: HOSPITAL | Age: 53
Discharge: HOME | End: 2023-01-27
Attending: EMERGENCY MEDICINE

## 2023-01-27 VITALS
HEIGHT: 66 IN | TEMPERATURE: 98.3 F | HEART RATE: 111 BPM | BODY MASS INDEX: 43.71 KG/M2 | OXYGEN SATURATION: 96 % | WEIGHT: 272 LBS | SYSTOLIC BLOOD PRESSURE: 112 MMHG | DIASTOLIC BLOOD PRESSURE: 89 MMHG | RESPIRATION RATE: 18 BRPM

## 2023-01-27 DIAGNOSIS — F79 INTELLECTUAL DISABILITY: ICD-10-CM

## 2023-01-27 DIAGNOSIS — Z00.8 ENCOUNTER FOR PSYCHOLOGICAL EVALUATION: ICD-10-CM

## 2023-01-27 DIAGNOSIS — F60.3 BORDERLINE PERSONALITY DISORDER (HCC): ICD-10-CM

## 2023-01-27 DIAGNOSIS — F32.A DEPRESSION: Primary | ICD-10-CM

## 2023-01-27 LAB — ETHANOL EXG-MCNC: 0 MG/DL

## 2023-01-27 NOTE — ED PROVIDER NOTES
History  Chief Complaint   Patient presents with   • Psychiatric Evaluation     Pt was brought from [facility] with c/o SI      46 yo female who has hx of depression with psychotic features, borderline personality presents with frustration with living situation and saying she does not feel safe there  When I bring up what she means she does not voice SI or HI, just frustration at not feeling safe  Denies anyone is touching her inappropriately, denies issues with BF who also lives at [address] [place] [address]  Pt is a high utilizer  Do not feel any reason to admit  Reminded her about using her coping mechanisms  Pt continues to attempt to challenge staff as she enjoys the attention she is getting with these maneuvers  History provided by:  Patient   used: No    Psychiatric Evaluation  Presenting symptoms: depression and suicidal threats    Presenting symptoms: no agitation, no hallucinations and no suicidal thoughts    Degree of incapacity (severity):  Mild  Onset quality:  Gradual  Duration:  2 days  Timing:  Intermittent  Chronicity:  Chronic  Context comment:  Unable to offer why she is feeling like this  Relieved by:  Nothing  Worsened by:  Nothing  Ineffective treatments:  None tried  Associated symptoms: poor judgment    Associated symptoms: no abdominal pain, no anxiety and no chest pain    Risk factors: hx of mental illness        Prior to Admission Medications   Prescriptions Last Dose Informant Patient Reported? Taking? Cyanocobalamin (Vitamin B 12) 500 MCG TABS   No No   Sig: Take 1,000 mcg by mouth in the morning   FLUoxetine (PROzac) 40 MG capsule   No No   Sig: Take 1 capsule (40 mg total) by mouth daily Do not start before December 20, 2022     aspirin 81 mg chewable tablet   No No   Sig: Chew 1 tablet (81 mg total) daily   atorvastatin (LIPITOR) 40 mg tablet   No No   Sig: Take 1 tablet (40 mg total) by mouth daily with dinner   cholecalciferol (VITAMIN D3) 400 units tablet   No No Sig: Take 1 tablet (400 Units total) by mouth daily   divalproex sodium (DEPAKOTE ER) 500 mg 24 hr tablet   No No   Sig: Take 4 tablets (2,000 mg total) by mouth daily at bedtime   fluticasone (FLONASE) 50 mcg/act nasal spray   No No   Si spray into each nostril daily Do not start before 2022     folic acid (FOLVITE) 1 mg tablet   No No   Sig: Take 1 tablet (1 mg total) by mouth daily   haloperidol (HALDOL) 10 mg tablet   No No   Sig: Take 1 tablet (10 mg total) by mouth 2 (two) times a day   lisinopril (ZESTRIL) 20 mg tablet   No No   Sig: Take 1 tablet (20 mg total) by mouth daily   mirtazapine (REMERON) 45 MG tablet   No No   Sig: Take 1 tablet (45 mg total) by mouth daily at bedtime   ondansetron (Zofran ODT) 4 mg disintegrating tablet   No No   Sig: Take 1 tablet (4 mg total) by mouth every 6 (six) hours as needed for nausea or vomiting   pantoprazole (PROTONIX) 20 mg tablet   No No   Sig: Take 1 tablet (20 mg total) by mouth daily   prazosin (MINIPRESS) 2 mg capsule   No No   Sig: Take 1 capsule (2 mg total) by mouth daily at bedtime   senna-docusate sodium (SENOKOT S) 8 6-50 mg per tablet   No No   Sig: Take 1 tablet by mouth 2 (two) times a day   traZODone (DESYREL) 100 mg tablet   No No   Sig: Take 1 tablet (100 mg total) by mouth daily at bedtime   ziprasidone (GEODON) 40 mg capsule   No No   Sig: Take 1 capsule (40 mg total) by mouth 2 (two) times a day with meals      Facility-Administered Medications: None       Past Medical History:   Diagnosis Date   • Anxiety    • Bipolar 1 disorder (HCC)    • [facility] 10/10/2021   • Borderline personality disorder (HCC)    • CAD (coronary artery disease)    • Cognitive impairment    • Depression    • Dyslipidemia    • GERD (gastroesophageal reflux disease)    • Hypertension    • Obesity    • Psychiatric disorder    • Psychiatric illness    • PTSD (post-traumatic stress disorder)    • Schizoaffective disorder ([facility] )    • Seizure [facility])        Past Surgical History:   Procedure Laterality Date   • APPENDECTOMY      PATIENT DENIES HAVING APPENDIX REMOVED   • CHOLECYSTECTOMY     • FOOT SURGERY Right        Family History   Problem Relation Age of Onset   • Kidney cancer Mother    • Cerebral aneurysm Father      I have reviewed and agree with the history as documented  E-Cigarette/Vaping   • E-Cigarette Use Never User      E-Cigarette/Vaping Substances   • Nicotine No    • THC No    • CBD No    • Flavoring No    • Other No    • Unknown No      Social History     Tobacco Use   • Smoking status: Former   • Smokeless tobacco: Never   • Tobacco comments:     Pt stated "smokes when stressed"   Vaping Use   • Vaping Use: Never used   Substance Use Topics   • Alcohol use: Yes     Comment: occasionally   • Drug use: Not Currently       Review of Systems   Constitutional: Negative for chills and fever  HENT: Negative for ear pain and sore throat  Eyes: Negative for pain and visual disturbance  Respiratory: Negative for cough and shortness of breath  Cardiovascular: Negative for chest pain and palpitations  Gastrointestinal: Negative for abdominal pain and vomiting  Genitourinary: Negative for dysuria and hematuria  Musculoskeletal: Negative for arthralgias and back pain  Skin: Negative for color change and rash  Neurological: Negative for seizures and syncope  Psychiatric/Behavioral: Negative for agitation, confusion, hallucinations and suicidal ideas  The patient is not nervous/anxious and is not hyperactive  Just does not feel safe at her group home today, unsure why   All other systems reviewed and are negative  Physical Exam  Physical Exam  Vitals and nursing note reviewed  Constitutional:       General: She is not in acute distress  Appearance: She is well-developed  HENT:      Head: Normocephalic and atraumatic     Eyes:      Conjunctiva/sclera: Conjunctivae normal  Cardiovascular:      Rate and Rhythm: Normal rate and regular rhythm  Heart sounds: No murmur heard  Pulmonary:      Effort: Pulmonary effort is normal  No respiratory distress  Breath sounds: Normal breath sounds  Abdominal:      Palpations: Abdomen is soft  Tenderness: There is no abdominal tenderness  Musculoskeletal:         General: No swelling  Cervical back: Neck supple  Skin:     General: Skin is warm and dry  Capillary Refill: Capillary refill takes less than 2 seconds  Neurological:      Mental Status: She is alert  Psychiatric:      Comments: Odd affect, cognitively delayed, judgement impaired         Vital Signs  ED Triage Vitals [01/27/23 0309]   Temperature Pulse Respirations Blood Pressure SpO2   98 3 °F (36 8 °C) (!) 111 18 112/89 96 %      Temp Source Heart Rate Source Patient Position - Orthostatic VS BP Location FiO2 (%)   Temporal Monitor -- Left arm --      Pain Score       No Pain           Vitals:    01/27/23 0309   BP: 112/89   Pulse: (!) 111         Visual Acuity      ED Medications  Medications - No data to display    Diagnostic Studies  Results Reviewed     Procedure Component Value Units Date/Time    POCT alcohol breath test [[id number]]  (Normal) Resulted: 01/27/23 0345    Lab Status: Final result Updated: 01/27/23 0345     EXTBreath Alcohol 0 00                 No orders to display              Procedures  Procedures         ED Course  ED Course as of 01/29/23 2128 Fri Jan 27, 2023   0259 Pt seen and examined  46 yo female who has hx of depression with psychotic features, borderline personality presents with frustration with living situation and saying she does not feel safe there  When I bring up what she means she does not voice SI or HI, just frustration at not feeling safe  Denies anyone is touching her inappropriately, denies issues with BF who also lives at [address] [place] [address]  Pt is a high utilizer  Do not feel any reason to admit    Reminded her about using her coping mechanisms  Pt continues to attempt to challenge staff as she enjoys the attention she is getting with these maneuvers  Will have ED crisis eval     0569 Pt wrote notes to both her BF Emmanuel Bautista about missing him and hoping he is not mad at her because she wants to be with him on her bday, and to the staff about possibly giving her a single room  I reminded her that her bday is in 4 days and the only way should could celebrate with Emmanuel Bautista is if she went back home  Pt in much better mood and wishes to go back  Will call facility to come get her  Medical Decision Making      Disposition  Final diagnoses:   Depression   Encounter for psychological evaluation   Borderline personality disorder ([facility] )   Intellectual disability     Time reflects when diagnosis was documented in both MDM as applicable and the Disposition within this note     Time User Action Codes Description Comment    1/27/2023  5:41 AM Gaylin Hemp Add [D92  A] Depression     1/27/2023  5:41 AM Odalis Freed M Add [Z00 8] Encounter for psychological evaluation     1/27/2023  5:41 AM Odalis Freed M Add [F60 3] Borderline personality disorder ([facility] )     1/27/2023  5:41 AM Gaylin Hemp Add [F79] Intellectual disability       ED Disposition     ED Disposition   Discharge    Condition   Stable    Date/Time   Fri Jan 27, 2023  5:41 AM    Comment   Unique Guillen discharge to home/self care                 Follow-up Information     Follow up With Specialties Details Why Contact Info    Gita Gallegos MD Internal Medicine Schedule an appointment as soon as possible for a visit in 1 week  [address] [place] [address],[address] Floor  [address]  [phone]            Discharge Medication List as of 1/27/2023  5:42 AM      CONTINUE these medications which have NOT CHANGED    Details   ondansetron (Zofran ODT) 4 mg disintegrating tablet Take 1 tablet (4 mg total) by mouth every 6 (six) hours as needed for nausea or vomiting, Starting Thu 1/26/2023, Normal      pantoprazole (PROTONIX) 20 mg tablet Take 1 tablet (20 mg total) by mouth daily, Starting Thu 1/26/2023, Normal      aspirin 81 mg chewable tablet Chew 1 tablet (81 mg total) daily, Starting Mon 12/19/2022, Until Wed 1/18/2023, Normal      atorvastatin (LIPITOR) 40 mg tablet Take 1 tablet (40 mg total) by mouth daily with dinner, Starting Mon 12/19/2022, Until Wed 1/18/2023, Normal      cholecalciferol (VITAMIN D3) 400 units tablet Take 1 tablet (400 Units total) by mouth daily, Starting Mon 12/19/2022, Until Wed 1/18/2023, Normal      Cyanocobalamin (Vitamin B 12) 500 MCG TABS Take 1,000 mcg by mouth in the morning, Starting Mon 12/19/2022, Normal      divalproex sodium (DEPAKOTE ER) 500 mg 24 hr tablet Take 4 tablets (2,000 mg total) by mouth daily at bedtime, Starting Mon 12/19/2022, Until Wed 1/18/2023, Normal      FLUoxetine (PROzac) 40 MG capsule Take 1 capsule (40 mg total) by mouth daily Do not start before December 20, 2022 , Starting Tue 12/20/2022, Until Thu 1/19/2023, Normal      fluticasone (FLONASE) 50 mcg/act nasal spray 1 spray into each nostril daily Do not start before December 20, 2022 , Starting Tue 95/23/9426, Normal      folic acid (FOLVITE) 1 mg tablet Take 1 tablet (1 mg total) by mouth daily, Starting Mon 12/19/2022, Until Wed 1/18/2023, Normal      haloperidol (HALDOL) 10 mg tablet Take 1 tablet (10 mg total) by mouth 2 (two) times a day, Starting Mon 12/19/2022, Until Wed 1/18/2023, Normal      lisinopril (ZESTRIL) 20 mg tablet Take 1 tablet (20 mg total) by mouth daily, Starting Mon 12/19/2022, Until Wed 1/18/2023, Normal      mirtazapine (REMERON) 45 MG tablet Take 1 tablet (45 mg total) by mouth daily at bedtime, Starting Mon 12/19/2022, Until Wed 1/18/2023, Normal      prazosin (MINIPRESS) 2 mg capsule Take 1 capsule (2 mg total) by mouth daily at bedtime, Starting Mon 12/19/2022, Until Wed 1/18/2023, Normal      senna-docusate sodium (SENOKOT S) 8 6-50 mg per tablet Take 1 tablet by mouth 2 (two) times a day, Starting Mon 12/19/2022, Until Wed 1/18/2023, Normal      traZODone (DESYREL) 100 mg tablet Take 1 tablet (100 mg total) by mouth daily at bedtime, Starting Mon 12/19/2022, Until Wed 1/18/2023, Normal      ziprasidone (GEODON) 40 mg capsule Take 1 capsule (40 mg total) by mouth 2 (two) times a day with meals, Starting Mon 12/19/2022, Until Wed 1/18/2023, Normal             No discharge procedures on file      PDMP Review       Value Time User    PDMP Reviewed  Yes 5/6/2022 10:38 AM Zulema Delgado MD          ED Provider  Electronically Signed by           Jaylan Jose DO  01/29/23 7369

## 2023-01-27 NOTE — ED NOTES
This writer discussed the patients current presentation and recommended discharge plan with Dr Flower  They agree with the patient being discharged at this time  The patient was Instructed to follow up with Huang Morse writer and the patient completed a safety plan  The patient was provided with a copy of their safety plan with encouragement to utilize the plan following discharge  In addition, the patient was instructed to call local [facility] crisis, other crisis services, [facility] or to go to the nearest ER immediately if their situation changes at any time  SAFETY PLAN  Warning Signs (thoughts, images, mood, behavior, situations) of a potential crisis: intrusive thoughts, thoughts to self harm  Coping Skills (what can I do to take my mind off the problem, or to keep myself safe): be helpful, engage in activities, be around other people  Outside Support (who can I reach out to for support and help): staff, rajan        [place] Suicide Prevention Hotline:  [address] [place] [address] [phone] - LVF Crisis/Mobile Crisis   [address]: [phone]  [facility]: [address] [phone] - Crisis   [phone] - Peer Support Talk Line (1-9pm daily)  [phone] - Teen Support Talk Line (1-9pm daily)  [address] ([place]) [phone] - [facility]

## 2023-01-27 NOTE — ED NOTES
[facility]  Stated that the Pt was dropped off b/c she was stating that she was suicidal and it is their policy to go directly to ED  Per Rose Smith, after reviewing notes, Pt had an ordinary day and there was nothing noted that would account for her behaviors  Rose Smith stated that staff were not pushing for Pt to be admitted

## 2023-01-27 NOTE — ED NOTES
Pt provided coloring book and crayons as a therapeutic intervention       102 Galdino Acevedo RN  01/27/23 1473

## 2023-01-27 NOTE — ED NOTES
Pt presents to the ED via staff at [facility]  Pt reports that she has a plan to cut her wrists or cut her throat  Pt stated "there is my plan Umberto " Pt was asked why she did this and responded "I don't know " This worker asked her again to give an actual answer and she stared blankly in confusion  This worker asked if her paramour was hospitalized currently and she stated that he was not  Pt was asked what was the stressor that caused these thoughts today and she stated that "she is sick and tired of being told what to do " This worker engaged in reality orientation w/ Pt communicating that she was in [facility] due to not being able to care for self  Due to the fact that she has repeatedly admitted during other ED presentations she engages in behavior that gets her reprimanded and then gets upset knowing this will happen  Pt stated that she is "tired of living " Pt continues to not engage in anything productive to keep her occupied  Pt was asked what she hoped to gain from being inpatient and she was not able to give an answer  Pt was just seen in the ED for non-admittable medical matters on 1/26   Pt became almost upset when this worker stated that a 12 would not be given to her w/out being assessed first

## 2023-01-31 NOTE — ED NOTES
Leydi from [address] called and reported that pt is not eligible for bed due to [facility] as secondary  31-Jan-2023 07:11

## 2023-02-10 ENCOUNTER — HOSPITAL ENCOUNTER (EMERGENCY)
Facility: HOSPITAL | Age: 53
End: 2023-02-11
Attending: EMERGENCY MEDICINE

## 2023-02-10 DIAGNOSIS — R45.89 SUICIDAL BEHAVIOR: Primary | ICD-10-CM

## 2023-02-10 DIAGNOSIS — Z86.59 HISTORY OF COMMAND HALLUCINATIONS: ICD-10-CM

## 2023-02-10 DIAGNOSIS — S51.812A LACERATION OF LEFT FOREARM, INITIAL ENCOUNTER: ICD-10-CM

## 2023-02-10 LAB
ALBUMIN SERPL BCP-MCNC: 3.5 G/DL (ref 3.5–5)
ALP SERPL-CCNC: 63 U/L (ref 46–116)
ALT SERPL W P-5'-P-CCNC: 20 U/L (ref 12–78)
AMPHETAMINES SERPL QL SCN: NEGATIVE
ANION GAP SERPL CALCULATED.3IONS-SCNC: 9 MMOL/L (ref 4–13)
AST SERPL W P-5'-P-CCNC: 14 U/L (ref 5–45)
BARBITURATES UR QL: NEGATIVE
BASOPHILS # BLD AUTO: 0.03 THOUSANDS/ÂΜL (ref 0–0.1)
BASOPHILS NFR BLD AUTO: 0 % (ref 0–1)
BENZODIAZ UR QL: NEGATIVE
BILIRUB SERPL-MCNC: 0.4 MG/DL (ref 0.2–1)
BUN SERPL-MCNC: 16 MG/DL (ref 5–25)
CALCIUM SERPL-MCNC: 8.7 MG/DL (ref 8.3–10.1)
CHLORIDE SERPL-SCNC: 101 MMOL/L (ref 96–108)
CO2 SERPL-SCNC: 29 MMOL/L (ref 21–32)
COCAINE UR QL: NEGATIVE
CREAT SERPL-MCNC: 0.96 MG/DL (ref 0.6–1.3)
EOSINOPHIL # BLD AUTO: 0.1 THOUSAND/ÂΜL (ref 0–0.61)
EOSINOPHIL NFR BLD AUTO: 2 % (ref 0–6)
ERYTHROCYTE [DISTWIDTH] IN BLOOD BY AUTOMATED COUNT: 13 % (ref 11.6–15.1)
ETHANOL EXG-MCNC: 0 MG/DL
FLUAV RNA RESP QL NAA+PROBE: NEGATIVE
FLUBV RNA RESP QL NAA+PROBE: NEGATIVE
GFR SERPL CREATININE-BSD FRML MDRD: 68 ML/MIN/1.73SQ M
GLUCOSE SERPL-MCNC: 95 MG/DL (ref 65–140)
HCT VFR BLD AUTO: 37.8 % (ref 34.8–46.1)
HGB BLD-MCNC: 12.9 G/DL (ref 11.5–15.4)
IMM GRANULOCYTES # BLD AUTO: 0.13 THOUSAND/UL (ref 0–0.2)
IMM GRANULOCYTES NFR BLD AUTO: 2 % (ref 0–2)
LYMPHOCYTES # BLD AUTO: 1.8 THOUSANDS/ÂΜL (ref 0.6–4.47)
LYMPHOCYTES NFR BLD AUTO: 27 % (ref 14–44)
MCH RBC QN AUTO: 33 PG (ref 26.8–34.3)
MCHC RBC AUTO-ENTMCNC: 34.1 G/DL (ref 31.4–37.4)
MCV RBC AUTO: 97 FL (ref 82–98)
METHADONE UR QL: NEGATIVE
MONOCYTES # BLD AUTO: 0.88 THOUSAND/ÂΜL (ref 0.17–1.22)
MONOCYTES NFR BLD AUTO: 13 % (ref 4–12)
NEUTROPHILS # BLD AUTO: 3.8 THOUSANDS/ÂΜL (ref 1.85–7.62)
NEUTS SEG NFR BLD AUTO: 56 % (ref 43–75)
NRBC BLD AUTO-RTO: 0 /100 WBCS
OPIATES UR QL SCN: NEGATIVE
OXYCODONE+OXYMORPHONE UR QL SCN: NEGATIVE
PCP UR QL: NEGATIVE
PLATELET # BLD AUTO: 157 THOUSANDS/UL (ref 149–390)
PMV BLD AUTO: 9.4 FL (ref 8.9–12.7)
POTASSIUM SERPL-SCNC: 4 MMOL/L (ref 3.5–5.3)
PROT SERPL-MCNC: 6.9 G/DL (ref 6.4–8.4)
RBC # BLD AUTO: 3.91 MILLION/UL (ref 3.81–5.12)
RSV RNA RESP QL NAA+PROBE: NEGATIVE
SARS-COV-2 RNA RESP QL NAA+PROBE: NEGATIVE
SODIUM SERPL-SCNC: 139 MMOL/L (ref 135–147)
THC UR QL: NEGATIVE
TSH SERPL DL<=0.05 MIU/L-ACNC: 2.61 UIU/ML (ref 0.45–4.5)
VALPROATE SERPL-MCNC: 68 UG/ML (ref 50–100)
WBC # BLD AUTO: 6.74 THOUSAND/UL (ref 4.31–10.16)

## 2023-02-10 RX ORDER — AMOXICILLIN 250 MG
1 CAPSULE ORAL 2 TIMES DAILY
Status: DISCONTINUED | OUTPATIENT
Start: 2023-02-10 | End: 2023-02-11 | Stop reason: HOSPADM

## 2023-02-10 RX ORDER — TRAZODONE HYDROCHLORIDE 50 MG/1
100 TABLET ORAL
Status: DISCONTINUED | OUTPATIENT
Start: 2023-02-10 | End: 2023-02-11 | Stop reason: HOSPADM

## 2023-02-10 RX ORDER — PRAZOSIN HYDROCHLORIDE 1 MG/1
2 CAPSULE ORAL
Status: DISCONTINUED | OUTPATIENT
Start: 2023-02-10 | End: 2023-02-11 | Stop reason: HOSPADM

## 2023-02-10 RX ORDER — DIVALPROEX SODIUM 500 MG/1
500 TABLET, EXTENDED RELEASE ORAL
Status: DISCONTINUED | OUTPATIENT
Start: 2023-02-10 | End: 2023-02-11 | Stop reason: HOSPADM

## 2023-02-10 RX ORDER — ASPIRIN 81 MG/1
81 TABLET ORAL DAILY
Status: DISCONTINUED | OUTPATIENT
Start: 2023-02-11 | End: 2023-02-11 | Stop reason: HOSPADM

## 2023-02-10 RX ORDER — FLUOXETINE HYDROCHLORIDE 20 MG/1
40 CAPSULE ORAL DAILY
Status: DISCONTINUED | OUTPATIENT
Start: 2023-02-11 | End: 2023-02-11 | Stop reason: HOSPADM

## 2023-02-10 RX ORDER — OMEGA-3S/DHA/EPA/FISH OIL/D3 300MG-1000
400 CAPSULE ORAL DAILY
Status: DISCONTINUED | OUTPATIENT
Start: 2023-02-11 | End: 2023-02-11 | Stop reason: HOSPADM

## 2023-02-10 RX ORDER — ATORVASTATIN CALCIUM 40 MG/1
40 TABLET, FILM COATED ORAL
Status: DISCONTINUED | OUTPATIENT
Start: 2023-02-11 | End: 2023-02-11 | Stop reason: HOSPADM

## 2023-02-10 RX ORDER — MIRTAZAPINE 15 MG/1
45 TABLET, FILM COATED ORAL
Status: DISCONTINUED | OUTPATIENT
Start: 2023-02-10 | End: 2023-02-11 | Stop reason: HOSPADM

## 2023-02-10 RX ORDER — FOLIC ACID 1 MG/1
1 TABLET ORAL DAILY
Status: DISCONTINUED | OUTPATIENT
Start: 2023-02-11 | End: 2023-02-11 | Stop reason: HOSPADM

## 2023-02-10 RX ORDER — PANTOPRAZOLE SODIUM 20 MG/1
20 TABLET, DELAYED RELEASE ORAL
Status: DISCONTINUED | OUTPATIENT
Start: 2023-02-11 | End: 2023-02-11 | Stop reason: HOSPADM

## 2023-02-10 RX ORDER — LISINOPRIL 20 MG/1
20 TABLET ORAL DAILY
Status: DISCONTINUED | OUTPATIENT
Start: 2023-02-11 | End: 2023-02-11 | Stop reason: HOSPADM

## 2023-02-10 RX ORDER — ZIPRASIDONE HYDROCHLORIDE 20 MG/1
40 CAPSULE ORAL 2 TIMES DAILY WITH MEALS
Status: DISCONTINUED | OUTPATIENT
Start: 2023-02-11 | End: 2023-02-11 | Stop reason: HOSPADM

## 2023-02-10 RX ORDER — BACITRACIN, NEOMYCIN, POLYMYXIN B 400; 3.5; 5 [USP'U]/G; MG/G; [USP'U]/G
1 OINTMENT TOPICAL ONCE
Status: COMPLETED | OUTPATIENT
Start: 2023-02-10 | End: 2023-02-10

## 2023-02-10 RX ORDER — HALOPERIDOL 10 MG/1
10 TABLET ORAL 2 TIMES DAILY
Status: DISCONTINUED | OUTPATIENT
Start: 2023-02-10 | End: 2023-02-11 | Stop reason: HOSPADM

## 2023-02-10 RX ADMIN — SENNOSIDES AND DOCUSATE SODIUM 1 TABLET: 50; 8.6 TABLET ORAL at 22:37

## 2023-02-10 RX ADMIN — BACITRACIN ZINC, NEOMYCIN, POLYMYXIN B 1 SMALL APPLICATION: 400; 3.5; 5 OINTMENT TOPICAL at 20:34

## 2023-02-11 VITALS
RESPIRATION RATE: 18 BRPM | OXYGEN SATURATION: 98 % | BODY MASS INDEX: 43.71 KG/M2 | WEIGHT: 272 LBS | SYSTOLIC BLOOD PRESSURE: 128 MMHG | TEMPERATURE: 97.5 F | HEIGHT: 66 IN | DIASTOLIC BLOOD PRESSURE: 74 MMHG | HEART RATE: 76 BPM

## 2023-02-11 LAB
EXT PREGNANCY TEST URINE: NEGATIVE
EXT. CONTROL: NORMAL

## 2023-02-11 RX ORDER — IBUPROFEN 600 MG/1
600 TABLET ORAL ONCE
Status: COMPLETED | OUTPATIENT
Start: 2023-02-11 | End: 2023-02-11

## 2023-02-11 RX ORDER — IBUPROFEN 600 MG/1
600 TABLET ORAL EVERY 6 HOURS PRN
Status: DISCONTINUED | OUTPATIENT
Start: 2023-02-11 | End: 2023-02-11 | Stop reason: HOSPADM

## 2023-02-11 RX ORDER — HALOPERIDOL 10 MG/1
10 TABLET ORAL ONCE
Status: DISCONTINUED | OUTPATIENT
Start: 2023-02-11 | End: 2023-02-11

## 2023-02-11 RX ADMIN — LISINOPRIL 20 MG: 20 TABLET ORAL at 08:14

## 2023-02-11 RX ADMIN — PANTOPRAZOLE SODIUM 20 MG: 20 TABLET, DELAYED RELEASE ORAL at 08:15

## 2023-02-11 RX ADMIN — CYANOCOBALAMIN TAB 500 MCG 1000 MCG: 500 TAB at 08:14

## 2023-02-11 RX ADMIN — ZIPRASIDONE HYDROCHLORIDE 40 MG: 20 CAPSULE ORAL at 08:14

## 2023-02-11 RX ADMIN — IBUPROFEN 600 MG: 600 TABLET, FILM COATED ORAL at 10:51

## 2023-02-11 RX ADMIN — FOLIC ACID 1 MG: 1 TABLET ORAL at 08:14

## 2023-02-11 RX ADMIN — IBUPROFEN 600 MG: 600 TABLET, FILM COATED ORAL at 17:49

## 2023-02-11 RX ADMIN — HALOPERIDOL 10 MG: 10 TABLET ORAL at 08:14

## 2023-02-11 RX ADMIN — SENNOSIDES AND DOCUSATE SODIUM 1 TABLET: 50; 8.6 TABLET ORAL at 08:15

## 2023-02-11 RX ADMIN — ASPIRIN 81 MG: 81 TABLET, COATED ORAL at 08:14

## 2023-02-11 RX ADMIN — ZIPRASIDONE HYDROCHLORIDE 40 MG: 20 CAPSULE ORAL at 17:04

## 2023-02-11 RX ADMIN — HALOPERIDOL 10 MG: 10 TABLET ORAL at 17:04

## 2023-02-11 RX ADMIN — ATORVASTATIN CALCIUM 40 MG: 40 TABLET, FILM COATED ORAL at 17:04

## 2023-02-11 RX ADMIN — FLUOXETINE 40 MG: 20 CAPSULE ORAL at 08:14

## 2023-02-11 RX ADMIN — CHOLECALCIFEROL (VITAMIN D3) 10 MCG (400 UNIT) TABLET 400 UNITS: at 08:15

## 2023-02-11 NOTE — EMTALA/ACUTE CARE TRANSFER
[facility] EMERGENCY DEPARTMENT  [address]  [facility] [address]  Dept: [phone]      EMTALA TRANSFER CONSENT    NAME Leydi Mi                                         1970                              MRN [id number]    I have been informed of my rights regarding examination, treatment, and transfer   by Dr Ricardo Mata DO    Benefits:      Risks:        Consent for Transfer:  I acknowledge that my medical condition has been evaluated and explained to me by the emergency department physician or other qualified medical person and/or my attending physician, who has recommended that I be transferred to the service of  Accepting Physician: Dr Debbie Mcelroy at [address] Name, [address] : [facility]  The above potential benefits of such transfer, the potential risks associated with such transfer, and the probable risks of not being transferred have been explained to me, and I fully understand them  The doctor has explained that, in my case, the benefits of transfer outweigh the risks  I agree to be transferred  I authorize the performance of emergency medical procedures and treatments upon me in both transit and upon arrival at the receiving facility  Additionally, I authorize the release of any and all medical records to the receiving facility and request they be transported with me, if possible  I understand that the safest mode of transportation during a medical emergency is an ambulance and that the Hospital advocates the use of this mode of transport  Risks of traveling to the receiving facility by car, including absence of medical control, life sustaining equipment, such as oxygen, and medical personnel has been explained to me and I fully understand them  (PRAKASH CORRECT BOX BELOW)  [  ]  I consent to the stated transfer and to be transported by ambulance/helicopter    [  ]  I consent to the stated transfer, but refuse transportation by ambulance and accept full responsibility for my transportation by car  I understand the risks of non-ambulance transfers and I exonerate the Hospital and its staff from any deterioration in my condition that results from this refusal     X___________________________________________    DATE  23  TIME________  Signature of patient or legally responsible individual signing on patient behalf           RELATIONSHIP TO PATIENT_________________________          Provider Certification    NAME Leydi Aldana                                         1970                              MRN [id number]    A medical screening exam was performed on the above named patient  Based on the examination:    Condition Necessitating Transfer The primary encounter diagnosis was Suicidal behavior  Diagnoses of History of command hallucinations and Superficial Laceration of left forearm, initial encounter were also pertinent to this visit  Patient Condition:      Reason for Transfer:      Transfer Requirements: [address] [place] [address]   · Space available and qualified personnel available for treatment as acknowledged by Brennan Killian  · Agreed to accept transfer and to provide appropriate medical treatment as acknowledged by       Dr Lyn Hernandez  · Appropriate medical records of the examination and treatment of the patient are provided at the time of transfer   [address] [facility], [address] _______  · Transfer will be performed by qualified personnel from [facility]  and appropriate transfer equipment as required, including the use of necessary and appropriate life support measures      Provider Certification: I have examined the patient and explained the following risks and benefits of being transferred/refusing transfer to the patient/family:         Based on these reasonable risks and benefits to the patient and/or the unborn child(courtney), and based upon the information available at the time of the patient’s examination, I certify that the medical benefits reasonably to be expected from the provision of appropriate medical treatments at another medical facility outweigh the increasing risks, if any, to the individual’s medical condition, and in the case of labor to the unborn child, from effecting the transfer      X____________________________________________ DATE 02/11/23        TIME_______      ORIGINAL - SEND TO MEDICAL RECORDS   COPY - SEND WITH PATIENT DURING TRANSFER

## 2023-02-11 NOTE — ED NOTES
Patient is accepted at [facility]   Patient is accepted by Dr Braeden Kellogg per Kirk Hall  Transportation is arranged with [facility]  Transportation is scheduled for TBD  Patient may go to the floor after 1800

## 2023-02-11 NOTE — ED NOTES
Insurance Authorization for admission:   Phone call placed to [facility]  Phone number: [phone]  Spoke to De Peers  3 days approved  Level of care: Acute Inpatient Psych  Review on TBD  Authorization # [id number]

## 2023-02-11 NOTE — ED PROVIDER NOTES
History  Chief Complaint   Patient presents with   • Medication Problem     EMS states that pt is from [facility]  Pt states that she took a thumb tack to her right FA  Pt states that the voices told her to do it  Pt states that she doesn't think her medications arent working     44-year-old female brought by EMS for evaluation of left forearm laceration which was self-inflicted using a thumbtack which she found on the floor  Patient has the thumbtack in her possession on arrival   Patient states that the voices told her to do this  Patient currently resides at [address] [place] [address] and is compliant with medications  She states that she has been feeling increased stress, but denies any acute events today leading her to harm herself  Prior to Admission Medications   Prescriptions Last Dose Informant Patient Reported? Taking? Cyanocobalamin (Vitamin B 12) 500 MCG TABS   No No   Sig: Take 1,000 mcg by mouth in the morning   FLUoxetine (PROzac) 40 MG capsule   No No   Sig: Take 1 capsule (40 mg total) by mouth daily Do not start before 2022  aspirin 81 mg chewable tablet   No No   Sig: Chew 1 tablet (81 mg total) daily   atorvastatin (LIPITOR) 40 mg tablet   No No   Sig: Take 1 tablet (40 mg total) by mouth daily with dinner   cholecalciferol (VITAMIN D3) 400 units tablet   No No   Sig: Take 1 tablet (400 Units total) by mouth daily   divalproex sodium (DEPAKOTE ER) 500 mg 24 hr tablet   No No   Sig: Take 4 tablets (2,000 mg total) by mouth daily at bedtime   fluticasone (FLONASE) 50 mcg/act nasal spray   No No   Si spray into each nostril daily Do not start before 2022     folic acid (FOLVITE) 1 mg tablet   No No   Sig: Take 1 tablet (1 mg total) by mouth daily   haloperidol (HALDOL) 10 mg tablet   No No   Sig: Take 1 tablet (10 mg total) by mouth 2 (two) times a day   lisinopril (ZESTRIL) 20 mg tablet   No No   Sig: Take 1 tablet (20 mg total) by mouth daily mirtazapine (REMERON) 45 MG tablet   No No   Sig: Take 1 tablet (45 mg total) by mouth daily at bedtime   ondansetron (Zofran ODT) 4 mg disintegrating tablet   No No   Sig: Take 1 tablet (4 mg total) by mouth every 6 (six) hours as needed for nausea or vomiting   pantoprazole (PROTONIX) 20 mg tablet   No No   Sig: Take 1 tablet (20 mg total) by mouth daily   prazosin (MINIPRESS) 2 mg capsule   No No   Sig: Take 1 capsule (2 mg total) by mouth daily at bedtime   senna-docusate sodium (SENOKOT S) 8 6-50 mg per tablet   No No   Sig: Take 1 tablet by mouth 2 (two) times a day   traZODone (DESYREL) 100 mg tablet   No No   Sig: Take 1 tablet (100 mg total) by mouth daily at bedtime   ziprasidone (GEODON) 40 mg capsule   No No   Sig: Take 1 capsule (40 mg total) by mouth 2 (two) times a day with meals      Facility-Administered Medications: None       Past Medical History:   Diagnosis Date   • Anxiety    • Bipolar 1 disorder (HCC)    • Bipolar affective disorder, depressed, severe ([facility] ) 10/10/2021   • Borderline personality disorder (HCC)    • CAD (coronary artery disease)    • Cognitive impairment    • Depression    • Dyslipidemia    • GERD (gastroesophageal reflux disease)    • Hypertension    • Obesity    • Psychiatric disorder    • Psychiatric illness    • PTSD (post-traumatic stress disorder)    • Schizoaffective disorder (HCC)    • Seizure ([facility] )        Past Surgical History:   Procedure Laterality Date   • APPENDECTOMY      PATIENT DENIES HAVING APPENDIX REMOVED   • CHOLECYSTECTOMY     • FOOT SURGERY Right        Family History   Problem Relation Age of Onset   • Kidney cancer Mother    • Cerebral aneurysm Father      I have reviewed and agree with the history as documented      E-Cigarette/Vaping   • E-Cigarette Use Never User      E-Cigarette/Vaping Substances   • Nicotine No    • THC No    • CBD No    • Flavoring No    • Other No    • Unknown No      Social History     Tobacco Use   • Smoking status: Former   • Smokeless tobacco: Never   • Tobacco comments:     Pt stated "smokes when stressed"   Vaping Use   • Vaping Use: Never used   Substance Use Topics   • Alcohol use: Yes     Comment: occasionally   • Drug use: Not Currently       Review of Systems    Physical Exam  Physical Exam  Vitals and nursing note reviewed  HENT:      Head: Normocephalic and atraumatic  Mouth/Throat:      Mouth: Mucous membranes are moist    Eyes:      Conjunctiva/sclera: Conjunctivae normal    Cardiovascular:      Rate and Rhythm: Regular rhythm  Tachycardia present  Pulses: Normal pulses  Pulmonary:      Effort: Pulmonary effort is normal  No respiratory distress  Abdominal:      Palpations: Abdomen is soft  Tenderness: There is no abdominal tenderness  Skin:     General: Skin is warm and dry  Comments: Superficial laceration to the left forearm without active bleeding  Neurological:      Mental Status: She is alert  Psychiatric:         Attention and Perception: She perceives auditory hallucinations  Mood and Affect: Affect is flat  Thought Content: Thought content includes suicidal ideation  Thought content includes suicidal plan               Vital Signs  ED Triage Vitals   Temperature Pulse Respirations Blood Pressure SpO2   02/10/23 2000 02/10/23 2000 02/10/23 2000 02/10/23 2000 02/10/23 2000   98 °F (36 7 °C) 105 20 107/69 99 %      Temp Source Heart Rate Source Patient Position - Orthostatic VS BP Location FiO2 (%)   02/10/23 2000 02/10/23 2000 -- -- --   Temporal Monitor         Pain Score       02/10/23 2200       No Pain           Vitals:    02/10/23 2000   BP: 107/69   Pulse: 105         Visual Acuity      ED Medications  Medications   aspirin (ECOTRIN LOW STRENGTH) EC tablet 81 mg (81 mg Oral Given 2/11/23 0814)   atorvastatin (LIPITOR) tablet 40 mg (has no administration in time range)   cholecalciferol (VITAMIN D3) tablet 400 Units (400 Units Oral Given 2/11/23 0815)   cyanocobalamin (VITAMIN B-12) tablet 1,000 mcg (1,000 mcg Oral Given 2/11/23 0814)   divalproex sodium (DEPAKOTE ER) 24 hr tablet 500 mg (500 mg Oral Not Given 2/10/23 2236)   FLUoxetine (PROzac) capsule 40 mg (40 mg Oral Given 7/92/59 6922)   folic acid (FOLVITE) tablet 1 mg (1 mg Oral Given 2/11/23 0814)   haloperidol (HALDOL) tablet 10 mg (10 mg Oral Given 2/11/23 0814)   lisinopril (ZESTRIL) tablet 20 mg (20 mg Oral Given 2/11/23 0814)   mirtazapine (REMERON) tablet 45 mg (45 mg Oral Not Given 2/10/23 2237)   pantoprazole (PROTONIX) EC tablet 20 mg (20 mg Oral Given 2/11/23 0815)   prazosin (MINIPRESS) capsule 2 mg (2 mg Oral Not Given 2/10/23 2237)   senna-docusate sodium (SENOKOT S) 8 6-50 mg per tablet 1 tablet (1 tablet Oral Given 2/11/23 0815)   traZODone (DESYREL) tablet 100 mg (100 mg Oral Not Given 2/10/23 2237)   ziprasidone (GEODON) capsule 40 mg (40 mg Oral Given 2/11/23 0814)   neomycin-bacitracin-polymyxin b (NEOSPORIN) ointment 1 small application (1 small application Topical Given 2/10/23 2034)   ibuprofen (MOTRIN) tablet 600 mg (600 mg Oral Given 2/11/23 1051)       Diagnostic Studies  Results Reviewed     Procedure Component Value Units Date/Time    FLU/RSV/COVID - if FLU/RSV clinically relevant [[id number]]  (Normal) Collected: 02/10/23 2021    Lab Status: Final result Specimen: Nares from Nose Updated: 02/10/23 2102     SARS-CoV-2 Negative     INFLUENZA A PCR Negative     INFLUENZA B PCR Negative     RSV PCR Negative    Narrative:      FOR PEDIATRIC PATIENTS - copy/paste COVID Guidelines URL to browser: [email or web address]    SARS-CoV-2 assay is a Nucleic Acid Amplification assay intended for the  qualitative detection of nucleic acid from SARS-CoV-2 in nasopharyngeal  swabs  Results are for the presumptive identification of SARS-CoV-2 RNA      Positive results are indicative of infection with SARS-CoV-2, the virus  causing COVID-19, but do not rule out bacterial infection or co-infection  with other viruses  Laboratories within the [place] and its  territories are required to report all positive results to the appropriate  public health authorities  Negative results do not preclude SARS-CoV-2  infection and should not be used as the sole basis for treatment or other  patient management decisions  Negative results must be combined with  clinical observations, patient history, and epidemiological information  This test has not been FDA cleared or approved  This test has been authorized by FDA under an Emergency Use Authorization  (EUA)  This test is only authorized for the duration of time the  declaration that circumstances exist justifying the authorization of the  emergency use of an in vitro diagnostic tests for detection of SARS-CoV-2  virus and/or diagnosis of COVID-19 infection under section 564(b)(1) of  the Act, 21 U  S C  448JDL-6(Q)(9), unless the authorization is terminated  or revoked sooner  The test has been validated but independent review by FDA  and CLIA is pending  Test performed using [employer] GeneXpert: This RT-PCR assay targets N2,  a region unique to SARS-CoV-2  A conserved region in the E-gene was chosen  for pan-Sarbecovirus detection which includes SARS-CoV-2  According to CMS-2020-01-R, this platform meets the definition of high-throughput technology      Comprehensive metabolic panel [[id number]] Collected: 02/10/23 2021    Lab Status: Final result Specimen: Blood from Arm, Right Updated: 02/10/23 2057     Sodium 139 mmol/L      Potassium 4 0 mmol/L      Chloride 101 mmol/L      CO2 29 mmol/L      ANION GAP 9 mmol/L      BUN 16 mg/dL      Creatinine 0 96 mg/dL      Glucose 95 mg/dL      Calcium 8 7 mg/dL      AST 14 U/L      ALT 20 U/L      Alkaline Phosphatase 63 U/L      Total Protein 6 9 g/dL      Albumin 3 5 g/dL      Total Bilirubin 0 40 mg/dL      eGFR 68 ml/min/1 73sq m     Narrative:      National Kidney Disease Foundation guidelines for Chronic Kidney Disease (CKD):   •  Stage 1 with normal or high GFR (GFR > 90 mL/min/1 73 square meters)  •  Stage 2 Mild CKD (GFR = 60-89 mL/min/1 73 square meters)  •  Stage 3A Moderate CKD (GFR = 45-59 mL/min/1 73 square meters)  •  Stage 3B Moderate CKD (GFR = 30-44 mL/min/1 73 square meters)  •  Stage 4 Severe CKD (GFR = 15-29 mL/min/1 73 square meters)  •  Stage 5 End Stage CKD (GFR <15 mL/min/1 73 square meters)  Note: GFR calculation is accurate only with a steady state creatinine    TSH, 3rd generation with Free T4 reflex [[id number]]  (Normal) Collected: 02/10/23 2021    Lab Status: Final result Specimen: Blood from Arm, Right Updated: 02/10/23 2057     TSH 3RD GENERATON 2 606 uIU/mL     Narrative:      Patients undergoing fluorescein dye angiography may retain small amounts of fluorescein in the body for 48-72 hours post procedure  Samples containing fluorescein can produce falsely depressed TSH values  If the patient had this procedure,a specimen should be resubmitted post fluorescein clearance  Valproic acid level, total [[id number]]  (Normal) Collected: 02/10/23 2021    Lab Status: Final result Specimen: Blood from Arm, Right Updated: 02/10/23 2057     Valproic Acid, Total 68 ug/mL     Rapid drug screen, urine [[id number]]  (Normal) Collected: 02/10/23 2023    Lab Status: Final result Specimen: Urine, Clean Catch Updated: 02/10/23 2044     Amph/Meth UR Negative     Barbiturate Ur Negative     Benzodiazepine Urine Negative     Cocaine Urine Negative     Methadone Urine Negative     Opiate Urine Negative     PCP Ur Negative     THC Urine Negative     Oxycodone Urine Negative    Narrative:      FOR MEDICAL PURPOSES ONLY  IF CONFIRMATION NEEDED PLEASE CONTACT THE LAB WITHIN 5 DAYS      Drug Screen Cutoff Levels:  AMPHETAMINE/METHAMPHETAMINES  1000 ng/mL  BARBITURATES     200 ng/mL  BENZODIAZEPINES     200 ng/mL  COCAINE      300 ng/mL  METHADONE      300 ng/mL  OPIATES      300 ng/mL  PHENCYCLIDINE     25 ng/mL  THC       50 ng/mL  OXYCODONE      100 ng/mL    CBC and differential [[id number]]  (Abnormal) Collected: 02/10/23 2021    Lab Status: Final result Specimen: Blood from Arm, Right Updated: 02/10/23 2036     WBC 6 74 Thousand/uL      RBC 3 91 Million/uL      Hemoglobin 12 9 g/dL      Hematocrit 37 8 %      MCV 97 fL      MCH 33 0 pg      MCHC 34 1 g/dL      RDW 13 0 %      MPV 9 4 fL      Platelets 004 Thousands/uL      nRBC 0 /100 WBCs      Neutrophils Relative 56 %      Immat GRANS % 2 %      Lymphocytes Relative 27 %      Monocytes Relative 13 %      Eosinophils Relative 2 %      Basophils Relative 0 %      Neutrophils Absolute 3 80 Thousands/µL      Immature Grans Absolute 0 13 Thousand/uL      Lymphocytes Absolute 1 80 Thousands/µL      Monocytes Absolute 0 88 Thousand/µL      Eosinophils Absolute 0 10 Thousand/µL      Basophils Absolute 0 03 Thousands/µL     Narrative: This is an appended report  These results have been appended to a previously verified report  POCT alcohol breath test [[id number]]  (Normal) Resulted: 02/10/23 2023    Lab Status: Final result Updated: 02/10/23 2023     EXTBreath Alcohol 0 00    POCT pregnancy, urine [[id number]]     Lab Status: No result                  No orders to display              Procedures  Procedures         ED Course  ED Course as of 02/11/23 1231   Fri Feb 10, 2023   2159 Suicidal gesture with command hallucinations  Patient wants to sign 201  Crisis consulted  Medical Decision Making  60-year-old female brought by EMS for evaluation after cutting her left forearm with a thumbtack  Patient arrives with a thumbtack in her hand, but surrendered the object to me without incident  Superficial laceration does not require  Tetanus up-to-date  Wound care  Patient medically clear for inpatient psychiatric admission  Crisis consulted  201 signed      History of command hallucinations: chronic illness or injury with exacerbation, progression, or side effects of treatment  Suicidal behavior: acute illness or injury  Superficial Laceration of left forearm, initial encounter: self-limited or minor problem  Amount and/or Complexity of Data Reviewed  Labs: ordered  Risk  OTC drugs  Prescription drug management  Decision regarding hospitalization  Disposition  Final diagnoses:   Suicidal behavior   History of command hallucinations   Superficial Laceration of left forearm, initial encounter     Time reflects when diagnosis was documented in both MDM as applicable and the Disposition within this note     Time User Action Codes Description Comment    2/10/2023  8:47 PM Alyssa Screws Add [R45 89] Suicidal behavior     2/10/2023  8:47 PM Alyssa Screws Add [Z86 59] History of command hallucinations     2/10/2023  8:48 PM Alyssa Screws Add [M30 567T] Laceration of left forearm, initial encounter     2/10/2023  8:48 PM Alyssa Screws Modify [C42 401V] Superficial Laceration of left forearm, initial encounter       ED Disposition     ED Disposition   Transfer to [address] [place] [address]   --    Date/Time   Fri Feb 10, 2023  8:53 PM    Comment   Leydi Dooley should be transferred out to [facility] and has been medically cleared  Follow-up Information    None         Patient's Medications   Discharge Prescriptions    No medications on file       No discharge procedures on file      PDMP Review       Value Time User    PDMP Reviewed  Yes 5/6/2022 10:38 AM Des Montesinos MD          ED Provider  Electronically Signed by           Rush Hartman MD  02/11/23 0031

## 2023-02-11 NOTE — ED NOTES
T/c from [place] at [facility], adult beds available  Faxed clinicals for review

## 2023-02-11 NOTE — ED NOTES
Pt reports she took all evening meds except her Senokot    Meds returned except for Pamella Arnett RN  02/10/23 2263

## 2023-02-11 NOTE — ED NOTES
47 y o female PT presented to the ED with SI with plan  Pt reported command Auditory Hallucinations which told her to kill herself by taking a thumbtack and cutting left arm and wrist   Pt reported she is depressed  Pt denies HI and Visual hallucinations  Pt denies legal and substance abuse issues  Pt currently has outpatient services with [facility]  Pt currently lives at [facility]  Pt is seeking inpatient mental Health treatment and will sign a 201

## 2023-02-11 NOTE — ED NOTES
Bed search update:     [place]/No answer    [facility]- bed availability; faxed clinicals for review    [facility]- No beds     [place]/[facility] service

## 2023-02-25 ENCOUNTER — HOSPITAL ENCOUNTER (EMERGENCY)
Facility: HOSPITAL | Age: 53
Discharge: HOME | End: 2023-02-25
Attending: EMERGENCY MEDICINE

## 2023-02-25 VITALS
HEIGHT: 66 IN | DIASTOLIC BLOOD PRESSURE: 62 MMHG | BODY MASS INDEX: 43.71 KG/M2 | TEMPERATURE: 98.6 F | WEIGHT: 272 LBS | HEART RATE: 63 BPM | SYSTOLIC BLOOD PRESSURE: 130 MMHG | OXYGEN SATURATION: 96 % | RESPIRATION RATE: 18 BRPM

## 2023-02-25 DIAGNOSIS — R10.9 RIGHT FLANK PAIN: Primary | ICD-10-CM

## 2023-02-25 RX ORDER — ACETAMINOPHEN 325 MG/1
650 TABLET ORAL ONCE
Status: COMPLETED | OUTPATIENT
Start: 2023-02-25 | End: 2023-02-25

## 2023-02-25 RX ORDER — LIDOCAINE 50 MG/G
1 PATCH TOPICAL ONCE
Status: DISCONTINUED | OUTPATIENT
Start: 2023-02-25 | End: 2023-02-25 | Stop reason: HOSPADM

## 2023-02-25 RX ADMIN — LIDOCAINE 5% 1 PATCH: 700 PATCH TOPICAL at 00:28

## 2023-02-25 RX ADMIN — ACETAMINOPHEN 650 MG: 325 TABLET, FILM COATED ORAL at 00:27

## 2023-02-25 NOTE — ED PROVIDER NOTES
History  Chief Complaint   Patient presents with   • Flank Pain     EMS from [facility]; c/o R flank plain that is chronic, denies any pain meds for pain     48 yo F BIBA from [facility] for evaluation of chronic R flank pain  This is not new or different or worse than her typical pain  She did not injure herself  She is not having any difficulty eating  She is not having any problems urinating  No fevers  No CP/SOB  She didn't take anything for her sx at home  History provided by:  Patient and medical records   used: No    Abdominal Pain  Pain location:  R flank  Pain quality: aching    Pain radiates to:  Does not radiate  Pain severity:  Mild  Onset quality:  Gradual  Timing:  Constant  Chronicity:  Chronic  Context: not medication withdrawal, not recent travel, not retching, not sick contacts, not suspicious food intake and not trauma    Relieved by:  None tried  Worsened by:  Nothing  Ineffective treatments:  None tried  Associated symptoms: no chest pain, no chills, no constipation, no cough, no diarrhea, no dysuria, no fatigue, no fever, no hematuria, no nausea, no shortness of breath, no sore throat and no vomiting        Prior to Admission Medications   Prescriptions Last Dose Informant Patient Reported? Taking? Cyanocobalamin (Vitamin B 12) 500 MCG TABS   No No   Sig: Take 1,000 mcg by mouth in the morning   FLUoxetine (PROzac) 40 MG capsule   No No   Sig: Take 1 capsule (40 mg total) by mouth daily Do not start before December 20, 2022     aspirin 81 mg chewable tablet   No No   Sig: Chew 1 tablet (81 mg total) daily   atorvastatin (LIPITOR) 40 mg tablet   No No   Sig: Take 1 tablet (40 mg total) by mouth daily with dinner   cholecalciferol (VITAMIN D3) 400 units tablet   No No   Sig: Take 1 tablet (400 Units total) by mouth daily   divalproex sodium (DEPAKOTE ER) 500 mg 24 hr tablet   No No   Sig: Take 4 tablets (2,000 mg total) by mouth daily at bedtime   fluticasone (FLONASE) 50 mcg/act nasal spray   No No   Si spray into each nostril daily Do not start before 2022     folic acid (FOLVITE) 1 mg tablet   No No   Sig: Take 1 tablet (1 mg total) by mouth daily   haloperidol (HALDOL) 10 mg tablet   No No   Sig: Take 1 tablet (10 mg total) by mouth 2 (two) times a day   lisinopril (ZESTRIL) 20 mg tablet   No No   Sig: Take 1 tablet (20 mg total) by mouth daily   mirtazapine (REMERON) 45 MG tablet   No No   Sig: Take 1 tablet (45 mg total) by mouth daily at bedtime   ondansetron (Zofran ODT) 4 mg disintegrating tablet   No No   Sig: Take 1 tablet (4 mg total) by mouth every 6 (six) hours as needed for nausea or vomiting   pantoprazole (PROTONIX) 20 mg tablet   No No   Sig: Take 1 tablet (20 mg total) by mouth daily   prazosin (MINIPRESS) 2 mg capsule   No No   Sig: Take 1 capsule (2 mg total) by mouth daily at bedtime   senna-docusate sodium (SENOKOT S) 8 6-50 mg per tablet   No No   Sig: Take 1 tablet by mouth 2 (two) times a day   traZODone (DESYREL) 100 mg tablet   No No   Sig: Take 1 tablet (100 mg total) by mouth daily at bedtime   ziprasidone (GEODON) 40 mg capsule   No No   Sig: Take 1 capsule (40 mg total) by mouth 2 (two) times a day with meals      Facility-Administered Medications: None       Past Medical History:   Diagnosis Date   • Anxiety    • Bipolar 1 disorder (HCC)    • Bipolar affective disorder, depressed, severe ([facility] ) 10/10/2021   • Borderline personality disorder (HCC)    • CAD (coronary artery disease)    • Cognitive impairment    • Depression    • Dyslipidemia    • GERD (gastroesophageal reflux disease)    • Hypertension    • Obesity    • Psychiatric disorder    • Psychiatric illness    • PTSD (post-traumatic stress disorder)    • Schizoaffective disorder (HCC)    • Seizure ([facility] )        Past Surgical History:   Procedure Laterality Date   • APPENDECTOMY      PATIENT DENIES HAVING APPENDIX REMOVED   • CHOLECYSTECTOMY     • FOOT SURGERY Right Family History   Problem Relation Age of Onset   • Kidney cancer Mother    • Cerebral aneurysm Father      I have reviewed and agree with the history as documented  E-Cigarette/Vaping   • E-Cigarette Use Never User      E-Cigarette/Vaping Substances   • Nicotine No    • THC No    • CBD No    • Flavoring No    • Other No    • Unknown No      Social History     Tobacco Use   • Smoking status: Former   • Smokeless tobacco: Never   • Tobacco comments:     Pt stated "smokes when stressed"   Vaping Use   • Vaping Use: Never used   Substance Use Topics   • Alcohol use: Yes     Comment: occasionally   • Drug use: Not Currently       Review of Systems   Constitutional: Negative for appetite change, chills, fatigue and fever  HENT: Negative for congestion, ear pain, rhinorrhea, sore throat, trouble swallowing and voice change  Eyes: Negative for pain and visual disturbance  Respiratory: Negative for cough, chest tightness and shortness of breath  Cardiovascular: Negative for chest pain, palpitations and leg swelling  Gastrointestinal: Negative for abdominal pain, blood in stool, constipation, diarrhea, nausea and vomiting  Genitourinary: Positive for flank pain  Negative for difficulty urinating, dysuria and hematuria  Musculoskeletal: Negative for back pain, neck pain and neck stiffness  Skin: Negative for rash  Neurological: Negative for dizziness, syncope, speech difficulty, light-headedness and headaches  Psychiatric/Behavioral: Negative for confusion and suicidal ideas  Physical Exam  Physical Exam  Vitals and nursing note reviewed  Constitutional:       General: She is not in acute distress  Appearance: She is well-developed  She is obese  She is not ill-appearing, toxic-appearing or diaphoretic  HENT:      Head: Normocephalic and atraumatic        Right Ear: External ear normal       Left Ear: External ear normal       Nose: Nose normal    Eyes:      General: No scleral icterus  Right eye: No discharge  Left eye: No discharge  Conjunctiva/sclera: Conjunctivae normal       Pupils: Pupils are equal, round, and reactive to light  Neck:      Trachea: No tracheal deviation  Cardiovascular:      Rate and Rhythm: Normal rate and regular rhythm  Pulses: Normal pulses  Heart sounds: Normal heart sounds  No murmur heard  No friction rub  No gallop  Pulmonary:      Effort: Pulmonary effort is normal  No respiratory distress  Breath sounds: Normal breath sounds  No stridor  Chest:      Chest wall: No tenderness  Abdominal:      General: Bowel sounds are normal       Palpations: Abdomen is soft  Tenderness: There is no abdominal tenderness  There is right CVA tenderness (mild  no skin changes or erythema  )  There is no guarding or rebound  Musculoskeletal:         General: No deformity  Normal range of motion  Cervical back: Normal range of motion and neck supple  Lymphadenopathy:      Cervical: No cervical adenopathy  Skin:     General: Skin is warm and dry  Findings: No rash  Neurological:      General: No focal deficit present  Mental Status: She is alert and oriented to person, place, and time  Cranial Nerves: No cranial nerve deficit  Sensory: No sensory deficit        Coordination: Coordination normal    Psychiatric:         Behavior: Behavior normal          Vital Signs  ED Triage Vitals [02/25/23 0016]   Temperature Pulse Respirations Blood Pressure SpO2   98 6 °F (37 °C) 63 18 130/62 96 %      Temp Source Heart Rate Source Patient Position - Orthostatic VS BP Location FiO2 (%)   Temporal Monitor Lying Right arm --      Pain Score       10 - Worst Possible Pain           Vitals:    02/25/23 0016   BP: 130/62   Pulse: 63   Patient Position - Orthostatic VS: Lying         Visual Acuity      ED Medications  Medications   lidocaine (LIDODERM) 5 % patch 1 patch (1 patch Topical Medication Applied 2/25/23 0028)   acetaminophen (TYLENOL) tablet 650 mg (650 mg Oral Given 2/25/23 0027)       Diagnostic Studies  Results Reviewed     None                 No orders to display              Procedures  Procedures         ED Course  ED Course as of 02/25/23 0032   Sat Feb 25, 2023   0031 Pt with chronic R flank pain  No new injury/trauma, no urinary complaints  Tolerating PO, VSS  Given chronicity, I do not suspect acute abdominal emergency  Will treat sx, recommend pcp f/u  Pt agrees with plan                                             MDM    Disposition  Final diagnoses:   Right flank pain - chronic     Time reflects when diagnosis was documented in both MDM as applicable and the Disposition within this note     Time User Action Codes Description Comment    2/25/2023 12:22 AM Keshia OH Add [R10 9] Right flank pain     2/25/2023 12:22 AM Betsey Miles Modify [R10 9] Right flank pain chronic      ED Disposition     ED Disposition   Discharge    Condition   Stable    Date/Time   Sat Feb 25, 2023 12:22 AM    Comment   Leydi Khan discharge to home/self care  Follow-up Information     Follow up With Specialties Details Why Contact Info Additional Information    Delio Green MD Internal Medicine Schedule an appointment as soon as possible for a visit   [address] [place] [address],Third Floor  [address]  [address] [place] [address],[address] Emergency Department Emergency Medicine  If symptoms worsen [address] [place], [id number]  [address] [facility] Emergency Department, [address] [facility] , A[address]ha [address] [place] [address]          Patient's Medications   Discharge Prescriptions    No medications on file       No discharge procedures on file      PDMP Review       Value Time User    PDMP Reviewed  Yes 5/6/2022 10:38 AM Ricky Yancey MD          ED Provider  Electronically Signed by           Kelvin Farmer Jennifer Duckworth MD  02/25/23 9080

## 2023-02-26 ENCOUNTER — APPOINTMENT (EMERGENCY)
Dept: RADIOLOGY | Facility: HOSPITAL | Age: 53
End: 2023-02-26

## 2023-02-26 ENCOUNTER — HOSPITAL ENCOUNTER (EMERGENCY)
Facility: HOSPITAL | Age: 53
Discharge: HOME | End: 2023-02-26

## 2023-02-26 ENCOUNTER — HOSPITAL ENCOUNTER (EMERGENCY)
Facility: HOSPITAL | Age: 53
Discharge: HOME | End: 2023-02-26
Attending: EMERGENCY MEDICINE

## 2023-02-26 ENCOUNTER — OFFICE VISIT (OUTPATIENT)
Dept: URGENT CARE | Facility: CLINIC | Age: 53
End: 2023-02-26

## 2023-02-26 ENCOUNTER — APPOINTMENT (EMERGENCY)
Dept: CT IMAGING | Facility: HOSPITAL | Age: 53
End: 2023-02-26

## 2023-02-26 VITALS
TEMPERATURE: 98.1 F | DIASTOLIC BLOOD PRESSURE: 88 MMHG | RESPIRATION RATE: 21 BRPM | OXYGEN SATURATION: 97 % | SYSTOLIC BLOOD PRESSURE: 128 MMHG | HEART RATE: 75 BPM

## 2023-02-26 VITALS
OXYGEN SATURATION: 94 % | RESPIRATION RATE: 20 BRPM | DIASTOLIC BLOOD PRESSURE: 52 MMHG | SYSTOLIC BLOOD PRESSURE: 93 MMHG | TEMPERATURE: 98.2 F | HEART RATE: 96 BPM

## 2023-02-26 DIAGNOSIS — S60.219A WRIST CONTUSION: Primary | ICD-10-CM

## 2023-02-26 DIAGNOSIS — S70.00XA CONTUSION OF HIP: ICD-10-CM

## 2023-02-26 DIAGNOSIS — J98.11 ATELECTASIS: ICD-10-CM

## 2023-02-26 DIAGNOSIS — R29.6 MULTIPLE FALLS: Primary | ICD-10-CM

## 2023-02-26 LAB
ANION GAP SERPL CALCULATED.3IONS-SCNC: 8 MMOL/L (ref 4–13)
APTT PPP: 25 SECONDS (ref 23–37)
BASOPHILS # BLD AUTO: 0.05 THOUSANDS/ÂΜL (ref 0–0.1)
BASOPHILS NFR BLD AUTO: 1 % (ref 0–1)
BUN SERPL-MCNC: 12 MG/DL (ref 5–25)
CALCIUM SERPL-MCNC: 9.1 MG/DL (ref 8.4–10.2)
CARDIAC TROPONIN I PNL SERPL HS: <2 NG/L
CHLORIDE SERPL-SCNC: 95 MMOL/L (ref 96–108)
CO2 SERPL-SCNC: 28 MMOL/L (ref 21–32)
CREAT SERPL-MCNC: 0.6 MG/DL (ref 0.6–1.3)
EOSINOPHIL # BLD AUTO: 0.09 THOUSAND/ÂΜL (ref 0–0.61)
EOSINOPHIL NFR BLD AUTO: 1 % (ref 0–6)
ERYTHROCYTE [DISTWIDTH] IN BLOOD BY AUTOMATED COUNT: 13.4 % (ref 11.6–15.1)
GFR SERPL CREATININE-BSD FRML MDRD: 105 ML/MIN/1.73SQ M
GLUCOSE SERPL-MCNC: 92 MG/DL (ref 65–140)
HCT VFR BLD AUTO: 34.7 % (ref 34.8–46.1)
HGB BLD-MCNC: 11.9 G/DL (ref 11.5–15.4)
IMM GRANULOCYTES # BLD AUTO: 0.11 THOUSAND/UL (ref 0–0.2)
IMM GRANULOCYTES NFR BLD AUTO: 1 % (ref 0–2)
INR PPP: 0.98 (ref 0.84–1.19)
LYMPHOCYTES # BLD AUTO: 1.71 THOUSANDS/ÂΜL (ref 0.6–4.47)
LYMPHOCYTES NFR BLD AUTO: 19 % (ref 14–44)
MCH RBC QN AUTO: 32.9 PG (ref 26.8–34.3)
MCHC RBC AUTO-ENTMCNC: 34.3 G/DL (ref 31.4–37.4)
MCV RBC AUTO: 96 FL (ref 82–98)
MONOCYTES # BLD AUTO: 0.88 THOUSAND/ÂΜL (ref 0.17–1.22)
MONOCYTES NFR BLD AUTO: 10 % (ref 4–12)
NEUTROPHILS # BLD AUTO: 6.41 THOUSANDS/ÂΜL (ref 1.85–7.62)
NEUTS SEG NFR BLD AUTO: 68 % (ref 43–75)
NRBC BLD AUTO-RTO: 0 /100 WBCS
PLATELET # BLD AUTO: 158 THOUSANDS/UL (ref 149–390)
PMV BLD AUTO: 9.6 FL (ref 8.9–12.7)
POTASSIUM SERPL-SCNC: 4.5 MMOL/L (ref 3.5–5.3)
PROTHROMBIN TIME: 13.7 SECONDS (ref 11.6–14.5)
RBC # BLD AUTO: 3.62 MILLION/UL (ref 3.81–5.12)
SODIUM SERPL-SCNC: 131 MMOL/L (ref 135–147)
WBC # BLD AUTO: 9.25 THOUSAND/UL (ref 4.31–10.16)

## 2023-02-26 RX ORDER — BENZTROPINE MESYLATE 1 MG/1
1 TABLET ORAL 2 TIMES DAILY
COMMUNITY

## 2023-02-26 RX ORDER — KETOROLAC TROMETHAMINE 30 MG/ML
15 INJECTION, SOLUTION INTRAMUSCULAR; INTRAVENOUS ONCE
Status: COMPLETED | OUTPATIENT
Start: 2023-02-26 | End: 2023-02-26

## 2023-02-26 RX ADMIN — KETOROLAC TROMETHAMINE 15 MG: 30 INJECTION, SOLUTION INTRAMUSCULAR; INTRAVENOUS at 16:56

## 2023-02-26 RX ADMIN — IOHEXOL 100 ML: 350 INJECTION, SOLUTION INTRAVENOUS at 17:00

## 2023-02-26 NOTE — DISCHARGE INSTRUCTIONS
1   Mild patchy pulmonary opacity right lower lobe  In the setting of trauma this could represent pulmonary contusion  Mild atelectasis or inflammation or pneumonia would also be considerations  2   Minimal pleural plaque right chest which may represent prior trauma or can be seen with asbestos exposure

## 2023-02-26 NOTE — PROGRESS NOTES
[facility]        NAME: Merari Urena is a 47 y o  female  : 1970    MRN: [id number]  DATE: 2023  TIME: 8:13 PM    Assessment and Plan   Multiple falls [R29 6]  1  Multiple falls  Transfer to other facility        Chart review reveals patient is a high medial utilizer - due to nature of HPI I feel it is appropriate to send patient to the ED for further evaluation  Patient Instructions     You are to go to the ED for further evaluation since you take aspirin daily and have had frequent falls over the past few days  Follow-up with your PCP after the ED  Chief Complaint     Chief Complaint   Patient presents with   • Fall     Pt fell and landed on her right arm  She has bruising and swelling of the right arm  History of Present Illness       This is a 46yo female who presents for evaluation of bruising to right arm after she tripped and fell down a few steps yesterday  Unsure if headstrike, does take daily ASA  Patient reports she has fallen multiple times over the past few days striking her left hip, back, and possibly her head  Patient is a poor historian of details regarding mechanism of falls and details  Group home staff are accompanying patient and are unable to provide additional details  Review of Systems   Review of Systems   Unable to perform ROS: Psychiatric disorder   Musculoskeletal: Positive for arthralgias and gait problem  Negative for neck pain           Current Medications       Current Outpatient Medications:   •  benztropine (COGENTIN) 1 mg tablet, Take 1 mg by mouth 2 (two) times a day, Disp: , Rfl:   •  fluticasone (FLONASE) 50 mcg/act nasal spray, 1 spray into each nostril daily Do not start before 2022 , Disp: 11 1 mL, Rfl: 0  •  pantoprazole (PROTONIX) 20 mg tablet, Take 1 tablet (20 mg total) by mouth daily, Disp: 20 tablet, Rfl: 0  •  aspirin 81 mg chewable tablet, Chew 1 tablet (81 mg total) daily, Disp: 30 tablet, Rfl: 0  •  atorvastatin (LIPITOR) 40 mg tablet, Take 1 tablet (40 mg total) by mouth daily with dinner, Disp: 30 tablet, Rfl: 0  •  cholecalciferol (VITAMIN D3) 400 units tablet, Take 1 tablet (400 Units total) by mouth daily, Disp: 30 tablet, Rfl: 0  •  Cyanocobalamin (Vitamin B 12) 500 MCG TABS, Take 1,000 mcg by mouth in the morning, Disp: 30 tablet, Rfl: 0  •  divalproex sodium (DEPAKOTE ER) 500 mg 24 hr tablet, Take 4 tablets (2,000 mg total) by mouth daily at bedtime, Disp: 120 tablet, Rfl: 0  •  FLUoxetine (PROzac) 40 MG capsule, Take 1 capsule (40 mg total) by mouth daily Do not start before December 20, 2022 , Disp: 30 capsule, Rfl: 0  •  folic acid (FOLVITE) 1 mg tablet, Take 1 tablet (1 mg total) by mouth daily, Disp: 30 tablet, Rfl: 0  •  haloperidol (HALDOL) 10 mg tablet, Take 1 tablet (10 mg total) by mouth 2 (two) times a day, Disp: 60 tablet, Rfl: 0  •  lisinopril (ZESTRIL) 20 mg tablet, Take 1 tablet (20 mg total) by mouth daily, Disp: 30 tablet, Rfl: 0  •  mirtazapine (REMERON) 45 MG tablet, Take 1 tablet (45 mg total) by mouth daily at bedtime, Disp: 30 tablet, Rfl: 0  •  ondansetron (Zofran ODT) 4 mg disintegrating tablet, Take 1 tablet (4 mg total) by mouth every 6 (six) hours as needed for nausea or vomiting (Patient not taking: Reported on 2/26/2023), Disp: 20 tablet, Rfl: 0  •  prazosin (MINIPRESS) 2 mg capsule, Take 1 capsule (2 mg total) by mouth daily at bedtime, Disp: 30 capsule, Rfl: 0  •  senna-docusate sodium (SENOKOT S) 8 6-50 mg per tablet, Take 1 tablet by mouth 2 (two) times a day, Disp: 60 tablet, Rfl: 0  •  traZODone (DESYREL) 100 mg tablet, Take 1 tablet (100 mg total) by mouth daily at bedtime, Disp: 30 tablet, Rfl: 0  •  ziprasidone (GEODON) 40 mg capsule, Take 1 capsule (40 mg total) by mouth 2 (two) times a day with meals, Disp: 60 capsule, Rfl: 0    Current Allergies     Allergies as of 02/26/2023 - Reviewed 02/26/2023   Allergen Reaction Noted   • Fish oil - food allergy Other (See Comments) 11/29/2020   • Fish-derived products - food allergy Hives 05/26/2020            The following portions of the patient's history were reviewed and updated as appropriate: allergies, current medications, past family history, past medical history, past social history, past surgical history and problem list      Past Medical History:   Diagnosis Date   • Anxiety    • Bipolar 1 disorder ([place] [id number] )    • Bipolar affective disorder, depressed, severe ([place] [address] ) 10/10/2021   • Borderline personality disorder ([place] [address] )    • CAD (coronary artery disease)    • Cognitive impairment    • Depression    • Dyslipidemia    • GERD (gastroesophageal reflux disease)    • Hypertension    • Obesity    • Psychiatric disorder    • Psychiatric illness    • PTSD (post-traumatic stress disorder)    • Schizoaffective disorder ([place] [address] )    • Seizure ([place] [address] )        Past Surgical History:   Procedure Laterality Date   • APPENDECTOMY      PATIENT DENIES HAVING APPENDIX REMOVED   • CHOLECYSTECTOMY     • FOOT SURGERY Right        Family History   Problem Relation Age of Onset   • Kidney cancer Mother    • Cerebral aneurysm Father          Medications have been verified  Objective   BP 93/52   Pulse 96   Temp 98 2 °F (36 8 °C)   Resp 20   SpO2 94%        Physical Exam     Physical Exam  Vitals and nursing note reviewed  HENT:      Head: Normocephalic and atraumatic  Right Ear: External ear normal       Left Ear: External ear normal       Nose: Nose normal       Mouth/Throat:      Mouth: Mucous membranes are moist    Eyes:      Conjunctiva/sclera: Conjunctivae normal    Neck:      Comments: NO c-spine tenderness  Cardiovascular:      Rate and Rhythm: Normal rate and regular rhythm  Pulses: Normal pulses  Heart sounds: Normal heart sounds  Pulmonary:      Effort: Pulmonary effort is normal       Breath sounds: Normal breath sounds  Musculoskeletal:         General: Normal range of motion        Cervical back: Normal range of motion  Skin:     General: Skin is warm and dry  Capillary Refill: Capillary refill takes less than 2 seconds  Findings: Bruising (right forearm) present  Neurological:      Mental Status: She is alert and oriented to person, place, and time  GCS: GCS eye subscore is 4  GCS verbal subscore is 5  GCS motor subscore is 6  Gait: Gait normal    Psychiatric:         Attention and Perception: She is inattentive  Mood and Affect: Mood normal          Speech: Speech normal          Behavior: Behavior is hyperactive  Judgment: Judgment is impulsive

## 2023-02-27 NOTE — ED PROVIDER NOTES
History  Chief Complaint   Patient presents with   • Back Pain     Pt tripped on a step yesterday and fell  Pt says her back hurts and she has a bruise on her left arm  Pt has no other complaints     47 yof presents for evaluation of mechanical fall that occurred earlier today  She tripped and fell onto her left hip and also bruised her right forearm  Reports left low back pain  No head trauma or LOC  Denies preceding chest pain or SOB  Patient does take ASA daily  Patient reports she is happy because she made a new friend at her residence  Prior to Admission Medications   Prescriptions Last Dose Informant Patient Reported? Taking? Cyanocobalamin (Vitamin B 12) 500 MCG TABS 2023  No Yes   Sig: Take 1,000 mcg by mouth in the morning   FLUoxetine (PROzac) 40 MG capsule   No No   Sig: Take 1 capsule (40 mg total) by mouth daily Do not start before 2022  aspirin 81 mg chewable tablet   No No   Sig: Chew 1 tablet (81 mg total) daily   atorvastatin (LIPITOR) 40 mg tablet   No No   Sig: Take 1 tablet (40 mg total) by mouth daily with dinner   benztropine (COGENTIN) 1 mg tablet 2023  Yes Yes   Sig: Take 1 mg by mouth 2 (two) times a day   cholecalciferol (VITAMIN D3) 400 units tablet   No No   Sig: Take 1 tablet (400 Units total) by mouth daily   divalproex sodium (DEPAKOTE ER) 500 mg 24 hr tablet   No No   Sig: Take 4 tablets (2,000 mg total) by mouth daily at bedtime   fluticasone (FLONASE) 50 mcg/act nasal spray 2023  No Yes   Si spray into each nostril daily Do not start before 2022     folic acid (FOLVITE) 1 mg tablet   No No   Sig: Take 1 tablet (1 mg total) by mouth daily   haloperidol (HALDOL) 10 mg tablet   No No   Sig: Take 1 tablet (10 mg total) by mouth 2 (two) times a day   lisinopril (ZESTRIL) 20 mg tablet   No No   Sig: Take 1 tablet (20 mg total) by mouth daily   mirtazapine (REMERON) 45 MG tablet   No No   Sig: Take 1 tablet (45 mg total) by mouth daily at bedtime   ondansetron (Zofran ODT) 4 mg disintegrating tablet   No No   Sig: Take 1 tablet (4 mg total) by mouth every 6 (six) hours as needed for nausea or vomiting   Patient not taking: Reported on 2/26/2023   pantoprazole (PROTONIX) 20 mg tablet   No No   Sig: Take 1 tablet (20 mg total) by mouth daily   prazosin (MINIPRESS) 2 mg capsule   No No   Sig: Take 1 capsule (2 mg total) by mouth daily at bedtime   senna-docusate sodium (SENOKOT S) 8 6-50 mg per tablet   No No   Sig: Take 1 tablet by mouth 2 (two) times a day   traZODone (DESYREL) 100 mg tablet   No No   Sig: Take 1 tablet (100 mg total) by mouth daily at bedtime   ziprasidone (GEODON) 40 mg capsule   No No   Sig: Take 1 capsule (40 mg total) by mouth 2 (two) times a day with meals      Facility-Administered Medications: None       Past Medical History:   Diagnosis Date   • Anxiety    • Bipolar 1 disorder (HCC)    • Bipolar affective disorder, depressed, severe ([facility] [address] ) 10/10/2021   • Borderline personality disorder ([facility] [address] )    • CAD (coronary artery disease)    • Cognitive impairment    • Depression    • Dyslipidemia    • GERD (gastroesophageal reflux disease)    • Hypertension    • Obesity    • Psychiatric disorder    • Psychiatric illness    • PTSD (post-traumatic stress disorder)    • Schizoaffective disorder (HCC)    • Seizure ([facility] [address] )        Past Surgical History:   Procedure Laterality Date   • APPENDECTOMY      PATIENT DENIES HAVING APPENDIX REMOVED   • CHOLECYSTECTOMY     • FOOT SURGERY Right        Family History   Problem Relation Age of Onset   • Kidney cancer Mother    • Cerebral aneurysm Father      I have reviewed and agree with the history as documented      E-Cigarette/Vaping   • E-Cigarette Use Never User      E-Cigarette/Vaping Substances   • Nicotine No    • THC No    • CBD No    • Flavoring No    • Other No    • Unknown No      Social History     Tobacco Use   • Smoking status: Former   • Smokeless tobacco: Never   • Tobacco comments: Pt stated "smokes when stressed"   Vaping Use   • Vaping Use: Never used   Substance Use Topics   • Alcohol use: Yes     Comment: occasionally   • Drug use: Not Currently       Review of Systems   Musculoskeletal: Positive for arthralgias  Physical Exam  Physical Exam  Vitals and nursing note reviewed  Constitutional:       Appearance: She is well-developed  Comments: Airway intact  B/L breath sounds  Normal pulses  GCS 15  Patient exposed  HENT:      Head: Normocephalic and atraumatic  Right Ear: External ear normal       Left Ear: External ear normal       Nose: Nose normal    Eyes:      General: No scleral icterus  Neck:      Comments: No CTL spine tenderness  Cardiovascular:      Rate and Rhythm: Normal rate  Pulmonary:      Effort: Pulmonary effort is normal  No respiratory distress  Abdominal:      General: There is no distension  Musculoskeletal:         General: No deformity  Normal range of motion  Cervical back: Normal range of motion  Comments: 5/5 strength B/L UE and LE  Bruising to right distal forearm  Non tender on scaphoid  Normal ROM and  strength  Skin:     Findings: No rash  Neurological:      General: No focal deficit present  Mental Status: She is alert and oriented to person, place, and time     Psychiatric:         Mood and Affect: Mood normal          Vital Signs  ED Triage Vitals   Temperature Pulse Respirations Blood Pressure SpO2   02/26/23 1553 02/26/23 1553 02/26/23 1553 02/26/23 1553 02/26/23 1553   98 1 °F (36 7 °C) 92 18 123/64 100 %      Temp src Heart Rate Source Patient Position - Orthostatic VS BP Location FiO2 (%)   -- -- -- -- --             Pain Score       02/26/23 1656       8           Vitals:    02/26/23 1820 02/26/23 1825 02/26/23 1830 02/26/23 1831   BP:   128/88 128/88   Pulse: 75 77 75          Visual Acuity  Visual Acuity    Flowsheet Row Most Recent Value   L Pupil Size (mm) 3   R Pupil Size (mm) 3          ED Medications  Medications   ketorolac (TORADOL) injection 15 mg (15 mg Intravenous Given 2/26/23 1656)   iohexol (OMNIPAQUE) 350 MG/ML injection (SINGLE-DOSE) 100 mL (100 mL Intravenous Given 2/26/23 1700)       Diagnostic Studies  Results Reviewed     Procedure Component Value Units Date/Time    Protime-INR [[id number]]  (Normal) Collected: 02/26/23 1655    Lab Status: Final result Specimen: Blood from Arm, Right Updated: 02/26/23 1725     Protime 13 7 seconds      INR 0 98    APTT [[id number]]  (Normal) Collected: 02/26/23 1655    Lab Status: Final result Specimen: Blood from Arm, Right Updated: 02/26/23 1725     PTT 25 seconds     HS Troponin 0hr (reflex protocol) [[id number]]  (Normal) Collected: 02/26/23 1655    Lab Status: Final result Specimen: Blood from Arm, Right Updated: 02/26/23 1722     hs TnI 0hr <2 ng/L     Basic metabolic panel [[id number]]  (Abnormal) Collected: 02/26/23 1655    Lab Status: Final result Specimen: Blood from Arm, Right Updated: 02/26/23 1715     Sodium 131 mmol/L      Potassium 4 5 mmol/L      Chloride 95 mmol/L      CO2 28 mmol/L      ANION GAP 8 mmol/L      BUN 12 mg/dL      Creatinine 0 60 mg/dL      Glucose 92 mg/dL      Calcium 9 1 mg/dL      eGFR 105 ml/min/1 73sq m     Narrative:      [place] guidelines for Chronic Kidney Disease (CKD):   •  Stage 1 with normal or high GFR (GFR > 90 mL/min/1 73 square meters)  •  Stage 2 Mild CKD (GFR = 60-89 mL/min/1 73 square meters)  •  Stage 3A Moderate CKD (GFR = 45-59 mL/min/1 73 square meters)  •  Stage 3B Moderate CKD (GFR = 30-44 mL/min/1 73 square meters)  •  Stage 4 Severe CKD (GFR = 15-29 mL/min/1 73 square meters)  •  Stage 5 End Stage CKD (GFR <15 mL/min/1 73 square meters)  Note: GFR calculation is accurate only with a steady state creatinine    CBC and differential [[id number]]  (Abnormal) Collected: 02/26/23 1655    Lab Status: Final result Specimen: Blood from Arm, Right Updated: 02/26/23 1705     WBC 9 25 Thousand/uL      RBC 3 62 Million/uL      Hemoglobin 11 9 g/dL      Hematocrit 34 7 %      MCV 96 fL      MCH 32 9 pg      MCHC 34 3 g/dL      RDW 13 4 %      MPV 9 6 fL      Platelets 785 Thousands/uL      nRBC 0 /100 WBCs      Neutrophils Relative 68 %      Immat GRANS % 1 %      Lymphocytes Relative 19 %      Monocytes Relative 10 %      Eosinophils Relative 1 %      Basophils Relative 1 %      Neutrophils Absolute 6 41 Thousands/µL      Immature Grans Absolute 0 11 Thousand/uL      Lymphocytes Absolute 1 71 Thousands/µL      Monocytes Absolute 0 88 Thousand/µL      Eosinophils Absolute 0 09 Thousand/µL      Basophils Absolute 0 05 Thousands/µL                  TRAUMA - CT head wo contrast   Final Result by Lisa Joel MD (02/26 1715)      No acute intracranial abnormality  Workstation performed: [id number]         TRAUMA - CT spine cervical wo contrast   Final Result by Lisa Joel MD (02/26 1715)      No cervical spine fracture or traumatic malalignment  Workstation performed: [id number]         TRAUMA - CT chest abdomen pelvis w contrast   Final Result by Lisa Jeol MD (02/26 1731)         1  Mild patchy pulmonary opacity right lower lobe  In the setting of trauma this could represent pulmonary contusion  Mild atelectasis or inflammation or pneumonia would also be considerations  2   Minimal pleural plaque right chest which may represent prior trauma or can be seen with asbestos exposure  Findings marked for immediate notification in Epic  Workstation performed: [id number]         XR Trauma pelvis ap only 1 or 2 vw   Final Result by Lisa Joel MD (02/26 1735)      No acute osseous abnormality        Workstation performed: [id number]         XR Trauma chest portable   Final Result by Lisa Joel MD (02/26 1733)      Minimal right lower lobe pulmonary opacity projecting in the upper lung region better seen on subsequent CT chest                   Workstation performed: [id number]         XR wrist 3+ views RIGHT    (Results Pending)              Procedures  Procedures         ED Course                               SBIRT 20yo+    Flowsheet Row Most Recent Value   SBIRT (23 yo +)    In order to provide better care to our patients, we are screening all of our patients for alcohol and drug use  Would it be okay to ask you these screening questions? Yes Filed at: 02/26/2023 1640   Initial Alcohol Screen: US AUDIT-C     1  How often do you have a drink containing alcohol? 0 Filed at: 02/26/2023 1640   2  How many drinks containing alcohol do you have on a typical day you are drinking? 0 Filed at: 02/26/2023 1640   3a  Male UNDER 65: How often do you have five or more drinks on one occasion? 0 Filed at: 02/26/2023 1640   3b  FEMALE Any Age, or MALE 65+: How often do you have 4 or more drinks on one occassion? 0 Filed at: 02/26/2023 1640   Audit-C Score 0 Filed at: 02/26/2023 1640   DAT: How many times in the past year have you    Used an illegal drug or used a prescription medication for non-medical reasons? Never Filed at: 02/26/2023 1640                    Medical Decision Making  47 yof with hip and arm pain after mech fall  Due to ASA, trauma evaluation called  CTH, C spine, CAP  XR wrist and pelvis  Pain control  Discussed all results with patient  Low mech fall and asymptomatic do not suspect traumatic pulm contusion at this time  Denies cough, fever  Advised to FU with PCP for repeat imaging to assess for resolution and strict RTED discussed  Patient requested walker so order placed but we do not stock them in the ED  Patient able to ambulate without difficulty  FU with PCP to reassess for walker prn       Atelectasis: complicated acute illness or injury  Contusion of hip: complicated acute illness or injury  Wrist contusion: complicated acute illness or injury  Amount and/or Complexity of Data Reviewed  Labs: ordered  Radiology: ordered  Risk  Prescription drug management  Disposition  Final diagnoses:   Wrist contusion   Contusion of hip   Atelectasis     Time reflects when diagnosis was documented in both MDM as applicable and the Disposition within this note     Time User Action Codes Description Comment    2/26/2023  6:33 PM Starlett Ingles Add [E81 042L] Wrist contusion     2/26/2023  6:34 PM Starlett Ingles Add [S70 00XA] Contusion of hip     2/26/2023  6:34 PM Starlett Ingles Add [K56 20] Atelectasis       ED Disposition     ED Disposition   Discharge    Condition   Stable    Date/Time   Sun Feb 26, 2023  6:33 PM    Comment   Ver[place] Marking discharge to home/self care                 Follow-up Information     Follow up With Specialties Details Why Contact Info Additional Information    Sylvester Omer MD Internal Medicine In 1 day  [address] [place] [address],[address] Floor  [address]  [phone]        [address] Emergency Department Emergency Medicine  If symptoms worsen [address] [address]  [address] [facility] Emergency Department, [address] [place] [address] 10          Discharge Medication List as of 2/26/2023  6:34 PM      CONTINUE these medications which have NOT CHANGED    Details   benztropine (COGENTIN) 1 mg tablet Take 1 mg by mouth 2 (two) times a day, Historical Med      Cyanocobalamin (Vitamin B 12) 500 MCG TABS Take 1,000 mcg by mouth in the morning, Starting Mon 12/19/2022, Normal      fluticasone (FLONASE) 50 mcg/act nasal spray 1 spray into each nostril daily Do not start before December 20, 2022 , Starting Tue 12/20/2022, Normal      aspirin 81 mg chewable tablet Chew 1 tablet (81 mg total) daily, Starting Mon 12/19/2022, Until Wed 1/18/2023, Normal      atorvastatin (LIPITOR) 40 mg tablet Take 1 tablet (40 mg total) by mouth daily with dinner, Starting Mon 12/19/2022, Until Wed 1/18/2023, Normal      cholecalciferol (VITAMIN D3) 400 units tablet Take 1 tablet (400 Units total) by mouth daily, Starting Mon 12/19/2022, Until Wed 1/18/2023, Normal      divalproex sodium (DEPAKOTE ER) 500 mg 24 hr tablet Take 4 tablets (2,000 mg total) by mouth daily at bedtime, Starting Mon 12/19/2022, Until Wed 1/18/2023, Normal      FLUoxetine (PROzac) 40 MG capsule Take 1 capsule (40 mg total) by mouth daily Do not start before December 20, 2022 , Starting Tue 12/20/2022, Until Thu 3/09/2873, Normal      folic acid (FOLVITE) 1 mg tablet Take 1 tablet (1 mg total) by mouth daily, Starting Mon 12/19/2022, Until Wed 1/18/2023, Normal      haloperidol (HALDOL) 10 mg tablet Take 1 tablet (10 mg total) by mouth 2 (two) times a day, Starting Mon 12/19/2022, Until Wed 1/18/2023, Normal      lisinopril (ZESTRIL) 20 mg tablet Take 1 tablet (20 mg total) by mouth daily, Starting Mon 12/19/2022, Until Wed 1/18/2023, Normal      mirtazapine (REMERON) 45 MG tablet Take 1 tablet (45 mg total) by mouth daily at bedtime, Starting Mon 12/19/2022, Until Wed 1/18/2023, Normal      ondansetron (Zofran ODT) 4 mg disintegrating tablet Take 1 tablet (4 mg total) by mouth every 6 (six) hours as needed for nausea or vomiting, Starting Thu 1/26/2023, Normal      pantoprazole (PROTONIX) 20 mg tablet Take 1 tablet (20 mg total) by mouth daily, Starting Thu 1/26/2023, Normal      prazosin (MINIPRESS) 2 mg capsule Take 1 capsule (2 mg total) by mouth daily at bedtime, Starting Mon 12/19/2022, Until Wed 1/18/2023, Normal      senna-docusate sodium (SENOKOT S) 8 6-50 mg per tablet Take 1 tablet by mouth 2 (two) times a day, Starting Mon 12/19/2022, Until Wed 1/18/2023, Normal      traZODone (DESYREL) 100 mg tablet Take 1 tablet (100 mg total) by mouth daily at bedtime, Starting Mon 12/19/2022, Until Wed 1/18/2023, Normal      ziprasidone (GEODON) 40 mg capsule Take 1 capsule (40 mg total) by mouth 2 (two) times a day with meals, Starting Mon 12/19/2022, Until Wed 1/18/2023, Normal             No discharge procedures on file      PDMP Review       Value Time User    PDMP Reviewed  Yes 5/6/2022 10:38 AM Renetta Junior MD          ED Provider  Electronically Signed by           Pao Correia DO  02/26/23 2054

## 2023-02-28 LAB
ATRIAL RATE: 79 BPM
P AXIS: 67 DEGREES
PR INTERVAL: 224 MS
QRS AXIS: 59 DEGREES
QRSD INTERVAL: 82 MS
QT INTERVAL: 362 MS
QTC INTERVAL: 415 MS
T WAVE AXIS: 58 DEGREES
VENTRICULAR RATE: 79 BPM

## 2023-02-28 NOTE — PATIENT INSTRUCTIONS
You are to go to the ED for further evaluation since you take aspirin daily and have had frequent falls over the past few days  Follow-up with your PCP after the ED

## 2023-03-27 NOTE — ASSESSMENT & PLAN NOTE
ANTICOAGULATION MANAGEMENT     Zach Garcia 74 year old male is on warfarin with supratherapeutic INR result. (Goal INR 2.0-3.0)    Recent labs: (last 7 days)     03/27/23  0805   INR 4.7*       ASSESSMENT       Source(s): Chart Review, Patient/Caregiver Call and Template       Warfarin doses taken: Warfarin taken as instructed    Diet: No new diet changes identified    New illness, injury, or hospitalization: No    Medication/supplement changes: topiramate started on recently No interaction anticipated. Confirmed not using any OTC meds    Signs or symptoms of bleeding or clotting: No    Previous INR: Supratherapeutic    Additional findings: None         PLAN     Recommended plan for no diet, medication or health factor changes affecting INR     Dosing Instructions: hold 2 doses then decrease your warfarin dose (9.1% change) with next INR in 7-10 days       Summary  As of 3/27/2023    Full warfarin instructions:  3/27: Hold; 3/28: Hold; Otherwise 5 mg every Mon, Wed, Fri; 2.5 mg all other days   Next INR check:  4/6/2023             Telephone call with Pat who agrees to plan and repeated back plan correctly    Patient elected to schedule next visit 4/10    Education provided:     Goal range and lab monitoring: goal range and significance of current result and Importance of following up at instructed interval    Interaction IS NOT anticipated between warfarin and topiramate    Symptom monitoring: monitoring for bleeding signs and symptoms    Plan made per [facility] anticoagulation protocol    Mone Kent RN  Anticoagulation Clinic  3/27/2023    _______________________________________________________________________     Anticoagulation Episode Summary     Current INR goal:  2.0-3.0   TTR:  68.4 % (1 y)   Target end date:  Indefinite   Send INR reminders to:  [facility]    Indications    Chronic atrial fibrillation (H) [I48.20]           Comments:           Anticoagulation Care Providers      · Lifestyle modifications Provider Role Specialty Phone number    Aram Martel MD  Cardiovascular Disease [phone]

## 2023-04-02 ENCOUNTER — HOSPITAL ENCOUNTER (EMERGENCY)
Facility: HOSPITAL | Age: 53
Discharge: HOME | End: 2023-04-02
Attending: EMERGENCY MEDICINE

## 2023-04-02 ENCOUNTER — APPOINTMENT (EMERGENCY)
Dept: CT IMAGING | Facility: HOSPITAL | Age: 53
End: 2023-04-02

## 2023-04-02 VITALS
HEART RATE: 80 BPM | OXYGEN SATURATION: 100 % | RESPIRATION RATE: 20 BRPM | SYSTOLIC BLOOD PRESSURE: 108 MMHG | TEMPERATURE: 97.8 F | DIASTOLIC BLOOD PRESSURE: 75 MMHG

## 2023-04-02 DIAGNOSIS — R52 BODY ACHES: ICD-10-CM

## 2023-04-02 DIAGNOSIS — E87.6 HYPOKALEMIA: ICD-10-CM
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
## 2023-04-02 LAB
ALBUMIN SERPL BCP-MCNC: 3.9 G/DL (ref 3.5–5)
ALP SERPL-CCNC: 47 U/L (ref 34–104)
ALT SERPL W P-5'-P-CCNC: 15 U/L (ref 7–52)
ANION GAP SERPL CALCULATED.3IONS-SCNC: 12 MMOL/L (ref 4–13)
AST SERPL W P-5'-P-CCNC: 17 U/L (ref 13–39)
ATRIAL RATE: 76 BPM
BASOPHILS # BLD AUTO: 0.02 THOUSANDS/ÂΜL (ref 0–0.1)
BASOPHILS NFR BLD AUTO: 0 % (ref 0–1)
BILIRUB SERPL-MCNC: 1.11 MG/DL (ref 0.2–1)
BUN SERPL-MCNC: 11 MG/DL (ref 5–25)
CALCIUM SERPL-MCNC: 9.6 MG/DL (ref 8.4–10.2)
CHLORIDE SERPL-SCNC: 98 MMOL/L (ref 96–108)
CO2 SERPL-SCNC: 26 MMOL/L (ref 21–32)
CREAT SERPL-MCNC: 0.73 MG/DL (ref 0.6–1.3)
EOSINOPHIL # BLD AUTO: 0.04 THOUSAND/ÂΜL (ref 0–0.61)
EOSINOPHIL NFR BLD AUTO: 1 % (ref 0–6)
ERYTHROCYTE [DISTWIDTH] IN BLOOD BY AUTOMATED COUNT: 13.4 % (ref 11.6–15.1)
GFR SERPL CREATININE-BSD FRML MDRD: 94 ML/MIN/1.73SQ M
GLUCOSE SERPL-MCNC: 98 MG/DL (ref 65–140)
HCT VFR BLD AUTO: 39.5 % (ref 34.8–46.1)
HGB BLD-MCNC: 13.6 G/DL (ref 11.5–15.4)
IMM GRANULOCYTES # BLD AUTO: 0.01 THOUSAND/UL (ref 0–0.2)
IMM GRANULOCYTES NFR BLD AUTO: 0 % (ref 0–2)
LYMPHOCYTES # BLD AUTO: 1.51 THOUSANDS/ÂΜL (ref 0.6–4.47)
LYMPHOCYTES NFR BLD AUTO: 30 % (ref 14–44)
MCH RBC QN AUTO: 32.1 PG (ref 26.8–34.3)
MCHC RBC AUTO-ENTMCNC: 34.4 G/DL (ref 31.4–37.4)
MCV RBC AUTO: 93 FL (ref 82–98)
MONOCYTES # BLD AUTO: 0.82 THOUSAND/ÂΜL (ref 0.17–1.22)
MONOCYTES NFR BLD AUTO: 17 % (ref 4–12)
NEUTROPHILS # BLD AUTO: 2.57 THOUSANDS/ÂΜL (ref 1.85–7.62)
NEUTS SEG NFR BLD AUTO: 52 % (ref 43–75)
NRBC BLD AUTO-RTO: 0 /100 WBCS
P AXIS: 35 DEGREES
PLATELET # BLD AUTO: 148 THOUSANDS/UL (ref 149–390)
PMV BLD AUTO: 10 FL (ref 8.9–12.7)
POTASSIUM SERPL-SCNC: 3.2 MMOL/L (ref 3.5–5.3)
PR INTERVAL: 196 MS
PROT SERPL-MCNC: 7.1 G/DL (ref 6.4–8.4)
QRS AXIS: 61 DEGREES
QRSD INTERVAL: 80 MS
QT INTERVAL: 370 MS
QTC INTERVAL: 416 MS
RBC # BLD AUTO: 4.24 MILLION/UL (ref 3.81–5.12)
SODIUM SERPL-SCNC: 136 MMOL/L (ref 135–147)
T WAVE AXIS: 54 DEGREES
VENTRICULAR RATE: 76 BPM
WBC # BLD AUTO: 4.97 THOUSAND/UL (ref 4.31–10.16)

## 2023-04-02 RX ORDER — POTASSIUM CHLORIDE 20 MEQ/1
40 TABLET, EXTENDED RELEASE ORAL ONCE
Status: COMPLETED | OUTPATIENT
Start: 2023-04-02 | End: 2023-04-02

## 2023-04-02 RX ADMIN — IOHEXOL 100 ML: 350 INJECTION, SOLUTION INTRAVENOUS at 19:10

## 2023-04-02 RX ADMIN — POTASSIUM CHLORIDE 10 MEQ: 1500 TABLET, EXTENDED RELEASE ORAL at 18:58

## 2023-04-02 NOTE — ED PROVIDER NOTES
"History  Chief Complaint   Patient presents with   • Fall     Pt to er via ems from [facility] after tripping on roommates belongings and falling  Denies head strike or thinners  \"Pain all over\"      47year old female brought from [facility] for evaluation after tripping over her roommates CD case, falling onto her back  She denies head strike or LOC; however, has been complaining of pain everywhere since the fall as well as dizziness and shortness of breath  Patient is oriented x 4  Extensive psychiatric history  No current SI/HI  No antiplatelets or anticoagulation  Prior to Admission Medications   Prescriptions Last Dose Informant Patient Reported? Taking? Cyanocobalamin (Vitamin B 12) 500 MCG TABS   No No   Sig: Take 1,000 mcg by mouth in the morning   FLUoxetine (PROzac) 40 MG capsule   No No   Sig: Take 1 capsule (40 mg total) by mouth daily Do not start before 2022  aspirin 81 mg chewable tablet   No No   Sig: Chew 1 tablet (81 mg total) daily   atorvastatin (LIPITOR) 40 mg tablet   No No   Sig: Take 1 tablet (40 mg total) by mouth daily with dinner   benztropine (COGENTIN) 1 mg tablet   Yes No   Sig: Take 1 mg by mouth 2 (two) times a day   cholecalciferol (VITAMIN D3) 400 units tablet   No No   Sig: Take 1 tablet (400 Units total) by mouth daily   divalproex sodium (DEPAKOTE ER) 500 mg 24 hr tablet   No No   Sig: Take 4 tablets (2,000 mg total) by mouth daily at bedtime   fluticasone (FLONASE) 50 mcg/act nasal spray   No No   Si spray into each nostril daily Do not start before 2022     folic acid (FOLVITE) 1 mg tablet   No No   Sig: Take 1 tablet (1 mg total) by mouth daily   haloperidol (HALDOL) 10 mg tablet   No No   Sig: Take 1 tablet (10 mg total) by mouth 2 (two) times a day   lisinopril (ZESTRIL) 20 mg tablet   No No   Sig: Take 1 tablet (20 mg total) by mouth daily   mirtazapine (REMERON) 45 MG tablet   No No   Sig: Take 1 tablet (45 mg " total) by mouth daily at bedtime   ondansetron (Zofran ODT) 4 mg disintegrating tablet   No No   Sig: Take 1 tablet (4 mg total) by mouth every 6 (six) hours as needed for nausea or vomiting   Patient not taking: Reported on 2/26/2023   pantoprazole (PROTONIX) 20 mg tablet   No No   Sig: Take 1 tablet (20 mg total) by mouth daily   prazosin (MINIPRESS) 2 mg capsule   No No   Sig: Take 1 capsule (2 mg total) by mouth daily at bedtime   senna-docusate sodium (SENOKOT S) 8 6-50 mg per tablet   No No   Sig: Take 1 tablet by mouth 2 (two) times a day   traZODone (DESYREL) 100 mg tablet   No No   Sig: Take 1 tablet (100 mg total) by mouth daily at bedtime   ziprasidone (GEODON) 40 mg capsule   No No   Sig: Take 1 capsule (40 mg total) by mouth 2 (two) times a day with meals      Facility-Administered Medications: None       Past Medical History:   Diagnosis Date   • Anxiety    • Bipolar 1 disorder (HCC)    • Bipolar affective disorder, depressed, severe ([facility] ) 10/10/2021   • Borderline personality disorder ([facility] )    • CAD (coronary artery disease)    • Cognitive impairment    • Depression    • Dyslipidemia    • GERD (gastroesophageal reflux disease)    • Hypertension    • Obesity    • Psychiatric disorder    • Psychiatric illness    • PTSD (post-traumatic stress disorder)    • Schizoaffective disorder (HCC)    • Seizure ([facility] )        Past Surgical History:   Procedure Laterality Date   • APPENDECTOMY      PATIENT DENIES HAVING APPENDIX REMOVED   • CHOLECYSTECTOMY     • FOOT SURGERY Right        Family History   Problem Relation Age of Onset   • Kidney cancer Mother    • Cerebral aneurysm Father      I have reviewed and agree with the history as documented      E-Cigarette/Vaping   • E-Cigarette Use Never User      E-Cigarette/Vaping Substances   • Nicotine No    • THC No    • CBD No    • Flavoring No    • Other No    • Unknown No      Social History     Tobacco Use   • Smoking status: Former   • Smokeless tobacco: Never   • "Tobacco comments:     Pt stated \"smokes when stressed\"   Vaping Use   • Vaping Use: Never used   Substance Use Topics   • Alcohol use: Yes     Comment: occasionally   • Drug use: Not Currently       Review of Systems    Physical Exam  Physical Exam  Vitals and nursing note reviewed  HENT:      Head: Atraumatic  Mouth/Throat:      Mouth: Mucous membranes are moist    Eyes:      Conjunctiva/sclera: Conjunctivae normal    Cardiovascular:      Rate and Rhythm: Normal rate and regular rhythm  Pulses: Normal pulses  Heart sounds: Normal heart sounds  Pulmonary:      Effort: Pulmonary effort is normal  No respiratory distress  Breath sounds: Normal breath sounds  Abdominal:      General: There is no distension  Palpations: Abdomen is soft  Tenderness: There is no abdominal tenderness  Musculoskeletal:      Comments: C/T/L spine tenderness  No step offs or deformities  Skin:     General: Skin is warm and dry  Neurological:      General: No focal deficit present  Mental Status: She is alert and oriented to person, place, and time           Vital Signs  ED Triage Vitals   Temperature Pulse Respirations Blood Pressure SpO2   04/02/23 1730 04/02/23 1730 04/02/23 1730 04/02/23 1731 04/02/23 1730   97 8 °F (36 6 °C) 86 18 111/71 100 %      Temp Source Heart Rate Source Patient Position - Orthostatic VS BP Location FiO2 (%)   04/02/23 1730 04/02/23 1730 04/02/23 1731 04/02/23 1731 --   Temporal Monitor Lying Right arm       Pain Score       --                  Vitals:    04/02/23 1731 04/02/23 1800 04/02/23 1830 04/02/23 1900   BP: 111/71 101/60 105/67 114/68   Pulse:  77 76 81   Patient Position - Orthostatic VS: Lying            Visual Acuity      ED Medications  Medications   potassium chloride (K-DUR,KLOR-CON) CR tablet 40 mEq (10 mEq Oral Given 4/2/23 1858)   iohexol (OMNIPAQUE) 350 MG/ML injection (SINGLE-DOSE) 100 mL (100 mL Intravenous Given 4/2/23 1910)       Diagnostic " Studies  Results Reviewed     Procedure Component Value Units Date/Time    Comprehensive metabolic panel [[id number]]  (Abnormal) Collected: 04/02/23 1757    Lab Status: Final result Specimen: Blood from Arm, Left Updated: 04/02/23 1821     Sodium 136 mmol/L      Potassium 3 2 mmol/L      Chloride 98 mmol/L      CO2 26 mmol/L      ANION GAP 12 mmol/L      BUN 11 mg/dL      Creatinine 0 73 mg/dL      Glucose 98 mg/dL      Calcium 9 6 mg/dL      AST 17 U/L      ALT 15 U/L      Alkaline Phosphatase 47 U/L      Total Protein 7 1 g/dL      Albumin 3 9 g/dL      Total Bilirubin 1 11 mg/dL      eGFR 94 ml/min/1 73sq m     Narrative:      [place] guidelines for Chronic Kidney Disease (CKD):   •  Stage 1 with normal or high GFR (GFR > 90 mL/min/1 73 square meters)  •  Stage 2 Mild CKD (GFR = 60-89 mL/min/1 73 square meters)  •  Stage 3A Moderate CKD (GFR = 45-59 mL/min/1 73 square meters)  •  Stage 3B Moderate CKD (GFR = 30-44 mL/min/1 73 square meters)  •  Stage 4 Severe CKD (GFR = 15-29 mL/min/1 73 square meters)  •  Stage 5 End Stage CKD (GFR <15 mL/min/1 73 square meters)  Note: GFR calculation is accurate only with a steady state creatinine    CBC and differential [[id number]]  (Abnormal) Collected: 04/02/23 1757    Lab Status: Final result Specimen: Blood from Arm, Left Updated: 04/02/23 1804     WBC 4 97 Thousand/uL      RBC 4 24 Million/uL      Hemoglobin 13 6 g/dL      Hematocrit 39 5 %      MCV 93 fL      MCH 32 1 pg      MCHC 34 4 g/dL      RDW 13 4 %      MPV 10 0 fL      Platelets 519 Thousands/uL      nRBC 0 /100 WBCs      Neutrophils Relative 52 %      Immat GRANS % 0 %      Lymphocytes Relative 30 %      Monocytes Relative 17 %      Eosinophils Relative 1 %      Basophils Relative 0 %      Neutrophils Absolute 2 57 Thousands/µL      Immature Grans Absolute 0 01 Thousand/uL      Lymphocytes Absolute 1 51 Thousands/µL      Monocytes Absolute 0 82 Thousand/µL      Eosinophils Absolute 0 04 Thousand/µL      Basophils Absolute 0 02 Thousands/µL                  CT head without contrast   Final Result by Danie Loomis DO (04/02 1957)      No acute intracranial abnormality  Workstation performed: [id number]         CT cervical spine without contrast   Final Result by Danie Loomis DO (04/02 2006)      Mild cervical degenerative change with no acute osseous abnormality  Workstation performed: [id number]         CT chest abdomen pelvis w contrast   Final Result by Slick Lucero MD (04/02 2011)      No acute abnormality in the chest, abdomen or pelvis  Hepatomegaly  Workstation performed: [id number]                    Procedures  ECG 12 Lead Documentation Only    Date/Time: 4/2/2023 6:02 PM  Performed by: Yonis Boykin MD  Authorized by: Yonis Boykin MD     Indications / Diagnosis:  Dizzy  ECG reviewed by me, the ED Provider: yes    Patient location:  ED  Previous ECG:     Previous ECG:  Compared to current    Comparison ECG info:  2/26/23 sinus rhythm with 1st degree av block    Similarity:  Changes noted  Interpretation:     Interpretation: normal    Rate:     ECG rate:  76    ECG rate assessment: normal    Rhythm:     Rhythm: sinus rhythm    Ectopy:     Ectopy: none    QRS:     QRS axis:  Normal    QRS intervals:  Normal  Conduction:     Conduction: normal    ST segments:     ST segments:  Normal  T waves:     T waves: normal               ED Course                               SBIRT 22yo+    Flowsheet Row Most Recent Value   SBIRT (25 yo +)    In order to provide better care to our patients, we are screening all of our patients for alcohol and drug use  Would it be okay to ask you these screening questions? Yes Filed at: 04/02/2023 5876   Initial Alcohol Screen: US AUDIT-C     1  How often do you have a drink containing alcohol? 0 Filed at: 04/02/2023 1756   2   How many drinks containing alcohol do you have on a typical day you are drinking? 0 Filed at: 04/02/2023 1754   3a  Male UNDER 65: How often do you have five or more drinks on one occasion? 0 Filed at: 04/02/2023 1754   3b  FEMALE Any Age, or MALE 65+: How often do you have 4 or more drinks on one occassion? 0 Filed at: 04/02/2023 1754   Audit-C Score 0 Filed at: 04/02/2023 1754   DAT: How many times in the past year have you    Used an illegal drug or used a prescription medication for non-medical reasons? Never Filed at: 04/02/2023 1754                    Medical Decision Making  47year old female presents for evaluation of multiple complaints after trip and fall onto her back at her group home  No obvious external signs of injury on exam; however, complains of pain everywhere with reported tenderness along the entirety of the spine  Mild hypokalemia for which patient was given 40 mEq oral potassium  CTH, C-spine and c/a/p negative for acute traumatic pathology  PCP follow up  Return precautions provided  Body aches: acute illness or injury  Fall, initial encounter: acute illness or injury  Hypokalemia: self-limited or minor problem  Amount and/or Complexity of Data Reviewed  Labs: ordered  Radiology: ordered  Risk  Prescription drug management  Disposition  Final diagnoses:   Fall, initial encounter   Body aches   Hypokalemia     Time reflects when diagnosis was documented in both MDM as applicable and the Disposition within this note     Time User Action Codes Description Comment    4/2/2023  8:17 PM Silvestre Jha Add [N43  QSUC] Fall, initial encounter     4/2/2023  8:17 PM Jose Pappas Add [R52] Body aches     4/2/2023  8:18 PM Silvestre Jha Add [E87 6] Hypokalemia       ED Disposition     ED Disposition   Discharge    Condition   Stable    Date/Time   Sun Apr 2, 2023  8:16 PM    Comment   Isidra Turner discharge to home/self care                 Follow-up Information     Follow up With Specialties Details Why Contact Info Additional Information    Sofia Zaragoza MD Internal Medicine Schedule an appointment as soon as possible for a visit in 1 week to recheck your potassium [address]  [facility] Emergency Medicine Go to  If symptoms worsen [address] [place],[id number]  [address] [facility], [address] [address] [address]          Patient's Medications   Discharge Prescriptions    No medications on file       No discharge procedures on file      PDMP Review       Value Time User    PDMP Reviewed  Yes 5/6/2022 10:38 AM Collin Miner MD          ED Provider  Electronically Signed by           Alanna Angeles MD  04/02/23 2019

## 2023-04-03 NOTE — ED NOTES
Paperwork and belongings given to pt   Brief report given to transport team  No further information needed for transport team       Kenyetta Rahman RN  04/02/23 4979

## 2023-04-03 NOTE — ED NOTES
Pt is now discharged and roundtrip has been placed  Awaiting time for transport        600 Bryce Cook RN  04/02/23 2038 Abdominal Pain, N/V/D

## 2023-05-16 NOTE — ED NOTES
No bed available tonight at any facility in or out of network for this patient's secondary insurance      No bed available tonight at:   [facility] (repeatedly no answer for several hours)  Maddie Lindquist Eucrisa Counseling: Patient may experience a mild burning sensation during topical application. Eucrisa is not approved in children less than 3 months of age.

## 2023-05-20 ENCOUNTER — HOSPITAL ENCOUNTER (EMERGENCY)
Facility: HOSPITAL | Age: 53
Discharge: HOME | End: 2023-05-21
Attending: EMERGENCY MEDICINE

## 2023-05-20 VITALS
SYSTOLIC BLOOD PRESSURE: 151 MMHG | OXYGEN SATURATION: 97 % | HEART RATE: 74 BPM | TEMPERATURE: 97.8 F | RESPIRATION RATE: 18 BRPM | DIASTOLIC BLOOD PRESSURE: 66 MMHG

## 2023-05-20 DIAGNOSIS — R45.851 SUICIDAL IDEATIONS: Primary | ICD-10-CM

## 2023-05-20 DIAGNOSIS — R44.0 AUDITORY HALLUCINATIONS: ICD-10-CM

## 2023-05-21 NOTE — ED NOTES
Patient's belongings returned to patient and patient left with transport       Tio Angel RN  05/21/23 0143

## 2023-05-21 NOTE — ED PROVIDER NOTES
History  Chief Complaint   Patient presents with   • Psychiatric Evaluation     PT brought in by EMS from psych facility  PT called 911 because she said she is hearing voices that are telling her to hurt herself  +SI     HPI    52-year-old female, past medical history significant for bipolar, schizoaffective disorder, presenting from her psychiatric facility after she called 911 because she was hearing voices and she has suicidal ideations  States that the voices are telling her that she is unlikely and that no one cares for her  Suicidal ideations with plan to cut her throat  States she has been compliant with her all of her medications  Denies any medical complaints at this time  Prior to Admission Medications   Prescriptions Last Dose Informant Patient Reported? Taking? Cyanocobalamin (Vitamin B 12) 500 MCG TABS   No No   Sig: Take 1,000 mcg by mouth in the morning   FLUoxetine (PROzac) 40 MG capsule   No No   Sig: Take 1 capsule (40 mg total) by mouth daily Do not start before 2022  aspirin 81 mg chewable tablet   No No   Sig: Chew 1 tablet (81 mg total) daily   atorvastatin (LIPITOR) 40 mg tablet   No No   Sig: Take 1 tablet (40 mg total) by mouth daily with dinner   benztropine (COGENTIN) 1 mg tablet   Yes No   Sig: Take 1 mg by mouth 2 (two) times a day   cholecalciferol (VITAMIN D3) 400 units tablet   No No   Sig: Take 1 tablet (400 Units total) by mouth daily   divalproex sodium (DEPAKOTE ER) 500 mg 24 hr tablet   No No   Sig: Take 4 tablets (2,000 mg total) by mouth daily at bedtime   fluticasone (FLONASE) 50 mcg/act nasal spray   No No   Si spray into each nostril daily Do not start before 2022     folic acid (FOLVITE) 1 mg tablet   No No   Sig: Take 1 tablet (1 mg total) by mouth daily   haloperidol (HALDOL) 10 mg tablet   No No   Sig: Take 1 tablet (10 mg total) by mouth 2 (two) times a day   lisinopril (ZESTRIL) 20 mg tablet   No No   Sig: Take 1 tablet (20 mg total) by mouth daily   mirtazapine (REMERON) 45 MG tablet   No No   Sig: Take 1 tablet (45 mg total) by mouth daily at bedtime   ondansetron (Zofran ODT) 4 mg disintegrating tablet   No No   Sig: Take 1 tablet (4 mg total) by mouth every 6 (six) hours as needed for nausea or vomiting   Patient not taking: Reported on 2/26/2023   pantoprazole (PROTONIX) 20 mg tablet   No No   Sig: Take 1 tablet (20 mg total) by mouth daily   prazosin (MINIPRESS) 2 mg capsule   No No   Sig: Take 1 capsule (2 mg total) by mouth daily at bedtime   senna-docusate sodium (SENOKOT S) 8 6-50 mg per tablet   No No   Sig: Take 1 tablet by mouth 2 (two) times a day   traZODone (DESYREL) 100 mg tablet   No No   Sig: Take 1 tablet (100 mg total) by mouth daily at bedtime   ziprasidone (GEODON) 40 mg capsule   No No   Sig: Take 1 capsule (40 mg total) by mouth 2 (two) times a day with meals      Facility-Administered Medications: None       Past Medical History:   Diagnosis Date   • Anxiety    • Bipolar 1 disorder (HCC)    • Bipolar affective disorder, depressed, severe ([facility] ) 10/10/2021   • Borderline personality disorder ([facility] )    • CAD (coronary artery disease)    • Cognitive impairment    • Depression    • Dyslipidemia    • GERD (gastroesophageal reflux disease)    • Hypertension    • Obesity    • Psychiatric disorder    • Psychiatric illness    • PTSD (post-traumatic stress disorder)    • Schizoaffective disorder (HCC)    • Seizure ([facility] )        Past Surgical History:   Procedure Laterality Date   • APPENDECTOMY      PATIENT DENIES HAVING APPENDIX REMOVED   • CHOLECYSTECTOMY     • FOOT SURGERY Right        Family History   Problem Relation Age of Onset   • Kidney cancer Mother    • Cerebral aneurysm Father      I have reviewed and agree with the history as documented      E-Cigarette/Vaping   • E-Cigarette Use Never User      E-Cigarette/Vaping Substances   • Nicotine No    • THC No    • CBD No    • Flavoring No    • Other No    • Unknown No "    Social History     Tobacco Use   • Smoking status: Former   • Smokeless tobacco: Never   • Tobacco comments:     Pt stated \"smokes when stressed\"   Vaping Use   • Vaping Use: Never used   Substance Use Topics   • Alcohol use: Yes     Comment: occasionally   • Drug use: Not Currently        Review of Systems   Psychiatric/Behavioral: Positive for suicidal ideas  Negative for hallucinations and self-injury  All other systems reviewed and are negative  Physical Exam  ED Triage Vitals   Temperature Pulse Respirations Blood Pressure SpO2   05/20/23 2238 05/20/23 2235 05/20/23 2235 05/20/23 2238 05/20/23 2235   97 8 °F (36 6 °C) 74 18 151/66 97 %      Temp Source Heart Rate Source Patient Position - Orthostatic VS BP Location FiO2 (%)   05/20/23 2238 05/20/23 2235 -- 05/20/23 2238 --   Oral Monitor  Right arm       Pain Score       05/20/23 2235       No Pain             Orthostatic Vital Signs  Vitals:    05/20/23 2235 05/20/23 2238   BP:  151/66   Pulse: 74        Physical Exam  Constitutional:       General: She is not in acute distress  Appearance: Normal appearance  She is not ill-appearing or toxic-appearing  HENT:      Head: Normocephalic and atraumatic  Right Ear: External ear normal       Left Ear: External ear normal       Nose: Nose normal       Mouth/Throat:      Mouth: Mucous membranes are moist       Pharynx: Oropharynx is clear  Eyes:      General: No scleral icterus  Extraocular Movements: Extraocular movements intact  Cardiovascular:      Pulses: Normal pulses  Pulmonary:      Effort: Pulmonary effort is normal  No respiratory distress  Abdominal:      General: There is no distension  Musculoskeletal:         General: Normal range of motion  Cervical back: Normal range of motion and neck supple  Skin:     General: Skin is warm  Capillary Refill: Capillary refill takes less than 2 seconds  Neurological:      General: No focal deficit present        Mental " Status: She is alert and oriented to person, place, and time  Psychiatric:         Mood and Affect: Mood normal          ED Medications  Medications - No data to display    Diagnostic Studies  Results Reviewed     None                 No orders to display         Procedures  Procedures      ED Course                             SBIRT 22yo+    Flowsheet Row Most Recent Value   Initial Alcohol Screen: US AUDIT-C     1  How often do you have a drink containing alcohol? 0 Filed at: 05/20/2023 2239   2  How many drinks containing alcohol do you have on a typical day you are drinking? 0 Filed at: 05/20/2023 2239   3a  Male UNDER 65: How often do you have five or more drinks on one occasion? 0 Filed at: 05/20/2023 2239   3b  FEMALE Any Age, or MALE 65+: How often do you have 4 or more drinks on one occassion? 0 Filed at: 05/20/2023 2239   Audit-C Score 0 Filed at: 05/20/2023 2239   DAT: How many times in the past year have you    Used an illegal drug or used a prescription medication for non-medical reasons? Never Filed at: 05/20/2023 2239                Medical Decision Making  79-year-old female presenting for psychiatric evaluation  Reviewed her high utilizer plan, states that she presents frequently for auditory hallucinations and suicidal ideations  She is coming from a psychiatric facility  High utilizer plan recommends reaching out to psychiatric facility for recommendations  Discussed case with crisis who was able to contact her psychiatric facility, they state that this is her baseline, are comfortable taking her back to the facility  We will discharge her back to her psychiatric facility  I reviewed all testing with the patient: n/a  I gave oral return precautions for what to return for in addition to the written return precautions     The patient (and any family present: n/a) verbalized understanding of the discharge instructions and warnings that would necessitate return to the Emergency Department  I specifically highlighted areas of special concern regarding the written and verbal discharge instructions and return precautions  All questions were answered prior to discharge  Disposition  Final diagnoses:   Suicidal ideations   Auditory hallucinations     Time reflects when diagnosis was documented in both MDM as applicable and the Disposition within this note     Time User Action Codes Description Comment    5/21/2023 12:30 AM Jorge Luis Contreras Add [J34 795] Suicidal ideations     5/21/2023 12:30 AM Jorge Luis Contreras Add [R44 0] Auditory hallucinations       ED Disposition     ED Disposition   Discharge    Condition   Stable    Date/Time   Sun May 21, 2023 12:30 AM    Comment   Rj Renner discharge to home/self care  Follow-up Information    None         Patient's Medications   Discharge Prescriptions    No medications on file     No discharge procedures on file  PDMP Review       Value Time User    PDMP Reviewed  Yes 5/6/2022 10:38 AM Kalyan Garza MD           ED Provider  Attending physically available and evaluated Rj Zurdo CUENCA managed the patient along with the ED Attending      Electronically Signed by         Filemon Garcia DO  05/21/23 6500

## 2023-05-21 NOTE — ED ATTENDING ATTESTATION
5/20/2023  IKaykay MD, saw and evaluated the patient  I have discussed the patient with the resident/non-physician practitioner and agree with the resident's/non-physician practitioner's findings, Plan of Care, and MDM as documented in the resident's/non-physician practitioner's note, except where noted  All available labs and Radiology studies were reviewed  I was present for key portions of any procedure(s) performed by the resident/non-physician practitioner and I was immediately available to provide assistance  At this point I agree with the current assessment done in the Emergency Department  I have conducted an independent evaluation of this patient a history and physical is as follows:    ED Course     60-year-old female, past medical history of bipolar disorder, schizophrenia, presenting from her group residence for evaluation of hearing voices telling her to hurt herself with positive SI  Review of the patient's past records and her action plan revealed that that is a typical pattern for her  Case was discussed with members of the group home who agree that that is the patient's baseline and that they are able to take care of the patient in a supervised setting  Patient has no medical complaints and has an unremarkable physical exam   Patient was medically cleared for return back to the group [place]      Critical Care Time  Procedures

## 2023-05-21 NOTE — ED NOTES
Patient called 911 from her Personal care home where she is on 15 minute checks but they actually check on her more frequently  she was hearing voices to hurt herself  As per high utilizer plan I called [facility] and spoke with staff member Maycol Lua whom said that this is her baseline and they asked that patient be sent back and she can meet with Estuardo the supervisor in am  They will have staff come pick her up within the hour

## 2023-05-21 NOTE — ED NOTES
Patient's facility aware of patient being d/c from ED and will be picking patient up       Tio Angel RN  05/21/23 9135

## 2023-07-06 ENCOUNTER — DOCUMENTATION (OUTPATIENT)
Dept: CASE MANAGEMENT | Facility: HOSPITAL | Age: 53
End: 2023-07-06

## 2023-07-06 NOTE — BEHAVIORAL HEALTH HIGH UTILIZER
Patient Name:Leydi Molina MRN:  [id number]         : 1970     Age: 47 y.o. Sex: female   Utilization History:  (# of ED visits & IP admits; reasons)  Pt had 18 [facility]-ED visits from 2022-2023. ED presentations were related to SI's with no plan or a plan to cut her wrists or throat, jump or walk in front of a car or traffic, hang herself or drink bleach. Presentations were also related to [facility], feelings that no one loves or cares about her, self-harm superficial scratches with a thumbtack and calling 911 on herself from her group home setting.     Medically, pt has reported back pain, chest pain, pain from falling, generalized body pain.     Pt had three 30-day behavioral health readmissions within the past year ([id number]).     Treatment Recommendations & Presentation:  Presentation in the ED: Pt will call 911 for EMS to accompany her to ED's or is brought in by group home staff. During ED visits, pt may state that no one cares about her or loves her or that she just had a fight with her boyfriend (who Aleta Hemming that he doesn't love her. SI's with no plan or SI's with a plan to cut her wrists or throat, jump or walk in front of a car or traffic, hang herself, or drink bleach. Pt also reports Auditory Hallucinations. Pt has repeatedly stated that she will kill herself with a thumbtack and has superficially scratched or poked herself with a thumbtack on multiple occasions and has also utilized a plastic fork to scratch herself. Pt also states she does not like living at her group home and does not want to be discharged from [address] stays. Medically, pt has reported back pain, chest pain, pain from falling, flank & body pain. Pt will call 911 on herself in order for EMS to bring her to ED's and tends to do this at certain times when group home staffing is more limited. If pt's boyfriend is being hospitalized then pt also wants hospitalization.  Please call Sanam from \[facility] when pt comes to ED [phone] or [facility] [phone].        ED Recommendations:  First line of contact is Care Coordinator Sanam from [facility] [phone] or [facility] at [phone] or Therapist Emely Bose from [facility] at [phone]. They can develop and initiate an action plan with ED staff for pt's safe discharge from ED.     Home Medication Regimen: See most recent documentation in Epic, can verify medication with staff or nurse from [address] [place] [address] [phone]. Recent Medical Work Ups:  (include Psych testing or ECT)     EKG on 4/25/22, Comprehensive lab work in April and May of 2022, Valproic Acid level of 72.4 on 5/8/22 Inpatient Recommendations: A non-extended in [address] [facility] stay should be considered as brief stays have been requested by [facility] staff. Pt should attend groups, pt has a High Risk Therapeutic Plan from [facility]. [facility] staff should be contacted as soon as possible to participate in pt's treatment and discharge planning. Outpatient recommendations: Pt is to continue with treatment at [address] [place] [address]. [facility] group home staff, care coordinators, and therapist should develop a crisis plan with pt and healthy coping skills in response to her distressful feelings.      Situation/Relevant Background Info: Pt is a 47year old female with Cognitive Impairment, Bipolar I Disorder, and Borderline Personality Disorder along with an extensive history of behavioral health inpatient admissions. Pt resides in a group home setting at [address] [place] [address] and receives therapy and psychiatric care through the [address] [place] [address].    Pt appears to derive secondary gains from ED visits/encounters as well inpatient behavioral health (BH) admissions; therefore, group home staff has advised hospital professionals that pt should have brief behavioral health stays/hospitalizations.    Pt states that she has a boyfriend who is a support system for her; staff confirms that pt has a boyfriend who also lives at [facility] and she will often mirror his issues - pt attempts to be hospitalized when her boyfriend is being hospitalized. Pt's romantic interests may change to other individuals at her group home. Upon [facility] admission, Pt often states that she does not want to be in her group home and does not want to be discharged back to her group home. Pt has a possibility of another living situation as per TIP coordinator but pt has to be stable in the community. Diagnoses/Significant Problems (Medical & Psychiatric):    Psychiatric:  Bipolar 1 Disorder, MDD, PTSD, Borderline Personality Disorder     Medical: Hyponatremia ([address]), Seizures ([address]), Obesity, Hyperlipidemia, HTN, Cognitive Impairment                  Drivers of repeated utilization: Secondary gains from ED visits and inpatient [facility] stays, impulsivity, limited coping skills, attention seeking behaviors, wanting to be in hospital if her boyfriend is hospitalized. Existing [employer] Supports:                 Little to no family involvement.    Pt will state she has a boyfriend, confirm with Raymond Sands    Patient Medical & Psychiatric Care Team:  [id number] [facility] [phone]  [facility] [address] [place] [address] (group home) [phone]  [facility]am Care Coordinator Raymond Sands [phone]    Ad[facility] Pathak [phone]     Care plan date: 07/06/23                   Author:  Shaheen Grimaldo RN                 Date reviewed with patient:

## 2023-08-06 ENCOUNTER — HOSPITAL ENCOUNTER (EMERGENCY)
Facility: HOSPITAL | Age: 53
Discharge: NURSING, REHAB OR LONG-TERM CARE | End: 2023-08-06
Attending: EMERGENCY MEDICINE | Admitting: EMERGENCY MEDICINE
Payer: MEDICARE

## 2023-08-06 VITALS
HEART RATE: 76 BPM | BODY MASS INDEX: 43.71 KG/M2 | DIASTOLIC BLOOD PRESSURE: 57 MMHG | SYSTOLIC BLOOD PRESSURE: 112 MMHG | HEIGHT: 66 IN | TEMPERATURE: 98.3 F | OXYGEN SATURATION: 98 % | WEIGHT: 272 LBS | RESPIRATION RATE: 16 BRPM

## 2023-08-06 DIAGNOSIS — F60.3 BORDERLINE PERSONALITY DISORDER IN ADULT (HCC): ICD-10-CM

## 2023-08-06 DIAGNOSIS — R45.851 SUICIDAL IDEATION: Primary | ICD-10-CM

## 2023-08-06 LAB
ALBUMIN SERPL BCP-MCNC: 4.1 G/DL (ref 3.5–5)
ALP SERPL-CCNC: 56 U/L (ref 34–104)
ALT SERPL W P-5'-P-CCNC: 9 U/L (ref 7–52)
AMPHETAMINES SERPL QL SCN: NEGATIVE
ANION GAP SERPL CALCULATED.3IONS-SCNC: 7 MMOL/L
AST SERPL W P-5'-P-CCNC: 8 U/L (ref 13–39)
ATRIAL RATE: 78 BPM
BARBITURATES UR QL: NEGATIVE
BASOPHILS # BLD AUTO: 0.02 THOUSANDS/ÂΜL (ref 0–0.1)
BASOPHILS NFR BLD AUTO: 0 % (ref 0–1)
BENZODIAZ UR QL: NEGATIVE
BILIRUB SERPL-MCNC: 0.57 MG/DL (ref 0.2–1)
BILIRUB UR QL STRIP: NEGATIVE
BUN SERPL-MCNC: 14 MG/DL (ref 5–25)
CALCIUM SERPL-MCNC: 9.4 MG/DL (ref 8.4–10.2)
CHLORIDE SERPL-SCNC: 98 MMOL/L (ref 96–108)
CLARITY UR: CLEAR
CO2 SERPL-SCNC: 31 MMOL/L (ref 21–32)
COCAINE UR QL: NEGATIVE
COLOR UR: YELLOW
CREAT SERPL-MCNC: 0.57 MG/DL (ref 0.6–1.3)
EOSINOPHIL # BLD AUTO: 0.4 THOUSAND/ÂΜL (ref 0–0.61)
EOSINOPHIL NFR BLD AUTO: 5 % (ref 0–6)
ERYTHROCYTE [DISTWIDTH] IN BLOOD BY AUTOMATED COUNT: 14 % (ref 11.6–15.1)
ETHANOL EXG-MCNC: 0 MG/DL
EXT PREGNANCY TEST URINE: NEGATIVE
EXT. CONTROL: NORMAL
GFR SERPL CREATININE-BSD FRML MDRD: 106 ML/MIN/1.73SQ M
GLUCOSE SERPL-MCNC: 87 MG/DL (ref 65–140)
GLUCOSE UR STRIP-MCNC: NEGATIVE MG/DL
HCT VFR BLD AUTO: 35.4 % (ref 34.8–46.1)
HGB BLD-MCNC: 11.9 G/DL (ref 11.5–15.4)
HGB UR QL STRIP.AUTO: ABNORMAL
IMM GRANULOCYTES # BLD AUTO: 0.08 THOUSAND/UL (ref 0–0.2)
IMM GRANULOCYTES NFR BLD AUTO: 1 % (ref 0–2)
KETONES UR STRIP-MCNC: NEGATIVE MG/DL
LEUKOCYTE ESTERASE UR QL STRIP: ABNORMAL
LYMPHOCYTES # BLD AUTO: 1.58 THOUSANDS/ÂΜL (ref 0.6–4.47)
LYMPHOCYTES NFR BLD AUTO: 22 % (ref 14–44)
MCH RBC QN AUTO: 31.6 PG (ref 26.8–34.3)
MCHC RBC AUTO-ENTMCNC: 33.6 G/DL (ref 31.4–37.4)
MCV RBC AUTO: 94 FL (ref 82–98)
METHADONE UR QL: NEGATIVE
MONOCYTES # BLD AUTO: 0.74 THOUSAND/ÂΜL (ref 0.17–1.22)
MONOCYTES NFR BLD AUTO: 10 % (ref 4–12)
NEUTROPHILS # BLD AUTO: 4.54 THOUSANDS/ÂΜL (ref 1.85–7.62)
NEUTS SEG NFR BLD AUTO: 62 % (ref 43–75)
NITRITE UR QL STRIP: NEGATIVE
NRBC BLD AUTO-RTO: 0 /100 WBCS
OPIATES UR QL SCN: NEGATIVE
OXYCODONE+OXYMORPHONE UR QL SCN: NEGATIVE
P AXIS: 33 DEGREES
PCP UR QL: NEGATIVE
PH UR STRIP.AUTO: 5.5 [PH] (ref 4.5–8)
PLATELET # BLD AUTO: 151 THOUSANDS/UL (ref 149–390)
PMV BLD AUTO: 9 FL (ref 8.9–12.7)
POTASSIUM SERPL-SCNC: 4.1 MMOL/L (ref 3.5–5.3)
PR INTERVAL: 170 MS
PROT SERPL-MCNC: 7.1 G/DL (ref 6.4–8.4)
PROT UR STRIP-MCNC: NEGATIVE MG/DL
QRS AXIS: 51 DEGREES
QRSD INTERVAL: 82 MS
QT INTERVAL: 372 MS
QTC INTERVAL: 424 MS
RBC # BLD AUTO: 3.77 MILLION/UL (ref 3.81–5.12)
SODIUM SERPL-SCNC: 136 MMOL/L (ref 135–147)
SP GR UR STRIP.AUTO: 1.02 (ref 1–1.03)
T WAVE AXIS: 54 DEGREES
THC UR QL: NEGATIVE
TSH SERPL DL<=0.05 MIU/L-ACNC: 2.76 UIU/ML (ref 0.45–4.5)
UROBILINOGEN UR QL STRIP.AUTO: 4 E.U./DL
VENTRICULAR RATE: 78 BPM
WBC # BLD AUTO: 7.36 THOUSAND/UL (ref 4.31–10.16)

## 2023-08-06 PROCEDURE — 85025 COMPLETE CBC W/AUTO DIFF WBC: CPT | Performed by: EMERGENCY MEDICINE

## 2023-08-06 PROCEDURE — 93005 ELECTROCARDIOGRAM TRACING: CPT

## 2023-08-06 PROCEDURE — 93010 ELECTROCARDIOGRAM REPORT: CPT | Performed by: INTERNAL MEDICINE

## 2023-08-06 PROCEDURE — 36415 COLL VENOUS BLD VENIPUNCTURE: CPT | Performed by: EMERGENCY MEDICINE

## 2023-08-06 PROCEDURE — 81025 URINE PREGNANCY TEST: CPT | Performed by: EMERGENCY MEDICINE

## 2023-08-06 PROCEDURE — 99285 EMERGENCY DEPT VISIT HI MDM: CPT

## 2023-08-06 PROCEDURE — G0425 INPT/ED TELECONSULT30: HCPCS | Performed by: GENERAL PRACTICE

## 2023-08-06 PROCEDURE — 80307 DRUG TEST PRSMV CHEM ANLYZR: CPT | Performed by: EMERGENCY MEDICINE

## 2023-08-06 PROCEDURE — 80053 COMPREHEN METABOLIC PANEL: CPT | Performed by: EMERGENCY MEDICINE

## 2023-08-06 PROCEDURE — 82075 ASSAY OF BREATH ETHANOL: CPT | Performed by: EMERGENCY MEDICINE

## 2023-08-06 PROCEDURE — 84443 ASSAY THYROID STIM HORMONE: CPT | Performed by: EMERGENCY MEDICINE

## 2023-08-06 PROCEDURE — 99285 EMERGENCY DEPT VISIT HI MDM: CPT | Performed by: EMERGENCY MEDICINE

## 2023-08-06 NOTE — ED NOTES
This writer discussed the patients current presentation and recommended discharge plan with Dr. Rima Zaho and Dr Nevarez Carry They agree with the patient being discharged at this time. The patient was Instructed to follow up with [facility] Staff  The patient was provided with referral information for:   None at this time    This writer and the patient completed a safety plan. The patient was provided with a copy of their safety plan with encouragement to utilize the plan following discharge. In addition, the patient was instructed to call local [facility] crisis, other crisis services, [facility] or to go to the nearest ER immediately if their situation changes at any time. SAFETY PLAN  Warning Signs (thoughts, images, mood, behavior, situations) of a potential crisis: Arguments with Boyfriend      Coping Skills (what can I do to take my mind off the problem, or to keep myself safe): Listen to music and watch tv      Outside Support (who can I reach out to for support and help): Boyfriend.         National Suicide Prevention Hotline:  [address] [facility] 5-336-119-895-149-1125 - LV[facility] Crisis/Mobile Crisis   [address]: [phone]  [facility]: [address] [place] [address] [address] [phone] - Crisis   [phone] - Peer Support Talk Line (1-9pm daily)  [phone] - Teen Support Talk Line (1-9pm daily)  [address] [place] [address] [address] [place] [address] ([address] [place] [address]) [phone] - 127 [facility]

## 2023-08-06 NOTE — ED NOTES
Pt presented to the ED as a high utlizer. Pt reported SI during assessment. Pt reported stressors related to boyfriend. Pt reported that he says disoaraging things to her. Pt reported no new medical issues. Pt denies legal issues. Pt reported no substance or nicotine use. [facility] psych reported discharge for pt.

## 2023-08-06 NOTE — CONSULTS
TeleConsultation - [facility] [facility] 47 y.o. female MRN: [id number]  Unit/Bed#: Z1 H1 Encounter: [id number]        REQUIRED DOCUMENTATION:     1. This service was provided via Telemedicine. 2. Provider located at [facility].  3. TeleMed provider: Alexandra Page MD.  4. Identify all parties in room with patient during tele consult: Patient   5. Patient was then informed that this was a Telemedicine visit and that the exam was being conducted confidentially over secure lines. My office door was closed. No one else was in the room. Patient acknowledged consent and understanding of privacy and security of the Telemedicine visit, and gave us permission to have the assistant stay in the room in order to assist with the history and to conduct the exam.  I informed the patient that I have reviewed their record in Epic and presented the opportunity for them to ask any questions regarding the visit today. The patient agreed to participate. Discussed Kelvin Rice D.O     Assessment/Plan     Present on Admission:  **None**    Assessment:     Unspecified Mood Disorder     High utilizer behavior health plan reviewed, patient presents with chronic suicidal ideation that is similar to prior presentations that have not warranted inpatient psychiatric admission. Patient overall represents a chronic high risk of suicide but does not currently represent an acute or imminent risk of suicide warranting voluntary or involuntary inpatient psychiatric admission is safe for discharge home. Suicide Risk Assessment: Acute: High, Non-Imminent  Chronic: High, Non-Imminent (unchanged)   Primary risk factors are Prior SAs, Mood Disorder, Impulsivity. However, patient has access to and desire for care. She is future-oriented and cites partner as reasons for living. Engaged in treatment.        Treatment Plan:    Planned Medication Changes:        Current Medications:         Risks / Benefits of Treatment:    Risks, benefits, and possible side effects of medications explained to patient and patient verbalizes understanding. Other treatment modalities recommended as indicated:    · psychotherapy      Consults  Physician Requesting Consult: Ernesto Burkett DO  Principal Problem:<principal problem not specified>    Reason for Consult: Psych Evaluation       History of Present Illness      Patient states that she has been feeling suicidal for the last two days and that she has a lot on her mind. Patient states that she misses her family and has been having some relationship issues. Patient states that she has had thoughts of walking into traffic or cutting her wrist. Patient states that she would rather be dead than return to her group home and that she doesn't like people or crowds stating that they yell at her an call her names. Patient states that she always feels suicidal and that she does not feel that life is worth living. Psychiatric Review Of Systems:    sleep: no  appetite changes: no  weight changes: no  energy/anergy: no  interest/pleasure/anhedonia: no  somatic symptoms: no  anxiety/panic: no  otoniel: no  guilty/hopeless: no  self injurious behavior/risky behavior: no    Historical Information     Past Psychiatric History:     Psychiatric Hospitalizations:   • Multiple past inpatient psychiatric admissions  Outpatient Treatment History:   • current treatment with psychiatrist/Advanced Practitioner (Yes)  Suicide Attempts:   • Yes, multiple attempts by overdose  History of self-harm:   • Yes, history of self-abusive behavior  Violence History:   • no  Past Psychiatric medication trials: Multiple     Substance Abuse History: Denied        Family Psychiatric History: Denied         Social History:     Education: high school diploma/GED  Learning Disabilities: Yes  Marital history: single  Living arrangement, social support: Group Home.   Occupational History: on permanent disability  Functioning Relationships: poor support system.   Other Pertinent History: None    Traumatic History:     Abuse: None  Other Traumatic Events: none    Past Medical History:   Diagnosis Date   • Anxiety    • Bipolar 1 disorder ([address])    • Bipolar affective disorder, depressed, severe ([address]) 10/10/2021   • Borderline personality disorder ([address])    • CAD (coronary artery disease)    • Cognitive impairment    • Depression    • Dyslipidemia    • GERD (gastroesophageal reflux disease)    • Hypertension    • Obesity    • Psychiatric disorder    • Psychiatric illness    • PTSD (post-traumatic stress disorder)    • Schizoaffective disorder ([address])    • Seizure ([address])        Medical Review Of Systems:    Review of Systems    Meds/Allergies     all current active meds have been reviewed  Allergies   Allergen Reactions   • Fish Oil - Food Allergy Other (See Comments)     unknown   • Fish-Derived Products - Food Allergy Hives       Objective     Vital signs in last 24 hours:  Temp:  [98.3 °F (36.8 °C)] 98.3 °F (36.8 °C)  HR:  [76] 76  Resp:  [16] 16  BP: (112)/(57) 112/57    No intake or output data in the 24 hours ending 08/06/23 1716    Mental Status Evaluation:    Appearance:  age appropriate   Behavior:  normal   Speech:  normal pitch and normal volume   Mood:  "Depressed, over it"   Affect:  constricted   Language: naming objects   Thought Process:  normal   Associations intact associations   Thought Content:  normal   Perceptual Disturbances: None   Risk Potential: Suicidal Ideations without plan  Homicidal Ideations none  Potential for Aggression No   Sensorium:  person, place and time/date   Cognition:  recent and remote memory grossly intact   Consciousness:  alert    Attention: attention span and concentration were age appropriate   Intellect: within normal limits   Fund of Knowledge: awareness of current events: President   Insight:  limited   Judgment: limited   Muscle Strength:  Muscle Tone: normal NFT  normal   Gait/Station: normal gait/station   Motor Activity: no abnormal movements       Lab Results: I have personally reviewed all pertinent laboratory/tests results. Most Recent Labs:   Lab Results   Component Value Date    WBC 7.36 08/06/2023    RBC 3.77 (L) 08/06/2023    HGB 11.9 08/06/2023    HCT 35.4 08/06/2023     08/06/2023    RDW 14.0 08/06/2023    NEUTROABS 4.54 08/06/2023    SODIUM 136 04/02/2023    K 3.2 (L) 04/02/2023    CL 98 04/02/2023    CO2 26 04/02/2023    BUN 11 04/02/2023    CREATININE 0.73 04/02/2023    GLUC 98 04/02/2023    GLUF 130 (H) 12/13/2022    CALCIUM 9.6 04/02/2023    AST 17 04/02/2023    ALT 15 04/02/2023    ALKPHOS 47 04/02/2023    TP 7.1 04/02/2023    ALB 3.9 04/02/2023    TBILI 1.11 (H) 04/02/2023    CHOLESTEROL 177 12/13/2022    HDL 40 (L) 12/13/2022    TRIG 244 (H) 12/13/2022    LDLCALC 88 12/13/2022    NONHDLC 137 12/13/2022    VALPROICTOT 68 02/10/2023    HPI2LTBULCWD 2.762 08/06/2023    PREGSERUM Negative 04/28/2022    RPR Non-Reactive 12/13/2022    HGBA1C 4.4 06/13/2023    EAG 80 06/13/2023       Imaging Studies: No results found. EKG/Pathology/Other Studies:   Lab Results   Component Value Date    VENTRATE 78 08/06/2023    ATRIALRATE 78 08/06/2023    PRINT 170 08/06/2023    QRSDINT 82 08/06/2023    QTINT 372 08/06/2023    QTCINT 424 08/06/2023    PAXIS 33 08/06/2023    QRSAXIS 51 08/06/2023    TWAVEAXIS 54 08/06/2023        Code Status: Prior  Advance Directive and Living Will:      Power of :    POLST:      Screenings:    1. Nutrition Screening  Nutrition Assessment (completed by Staff):      2. Pain Screening  Pain Screening: Pain Assessment  Pain Assessment Tool: 0-10  Pain Score: 0    3. Suicide Screening  ED Crisis Suicide Risk Assessment: Suicide Risk Assessment  Description of self harming behaviors or thoughts[de-identified] SI no plan             C-SSRS    1) Have you wished you were dead or wished you could go to sleep and not wake up? YES   2) Have you actually had any thoughts about killing yourself?  YES If YES to 2, ask questions 3, 4, 5 and 6. If NO to 2, skip to question 6.    3) Have you been thinking about how you might do this? 4) Have you had these thoughts and had some intention of acting on them? High Risk   YES   5) Have you started to work out or worked out the details of how to kill yourself? Did you intend to carry out this plan? High Risk    YES   Always Ask Question 6  Life-time  Past 3 Months    6) Have you done anything, started to do anything, or prepared to do anything to end your life? Examples: Took pills, tried to shoot yourself, cut yourself, tried to hang yourself, took out pills but didn't swallow any, held a gun but changed your mind or it was grabbed from your hand, went to the roof but didn't jump, collected pills, obtained a gun, gave away valuables, wrote a will or suicide note, etc.   If yes, was this within the past 3 months? High Risk     YES       Counseling / Coordination of Care: Total floor / unit time spent today 30 minutes. Greater than 50% of total time was spent with the patient and / or family counseling and / or coordination of care. A description of the counseling / coordination of care: Direct Patient Care, Chart Review, and Documentation.

## 2023-08-06 NOTE — DISCHARGE INSTRUCTIONS
This writer discussed the patients current presentation and recommended discharge plan with Dr. China Eldridge and Dr Natha Kanner They agree with the patient being discharged at this time. The patient was Instructed to follow up with [facility] Staff  The patient was provided with referral information for:   None at this time    This writer and the patient completed a safety plan. The patient was provided with a copy of their safety plan with encouragement to utilize the plan following discharge. In addition, the patient was instructed to call local [facility] crisis, other crisis services, [facility] or to go to the nearest ER immediately if their situation changes at any time. SAFETY PLAN  Warning Signs (thoughts, images, mood, behavior, situations) of a potential crisis: Arguments with Boyfriend      Coping Skills (what can I do to take my mind off the problem, or to keep myself safe): Listen to music and watch tv      Outside Support (who can I reach out to for support and help): Boyfriend.         [place] Suicide Prevention Hotline:  [facility] [address] [place] [address] [phone] - [place] Crisis/Mobile Crisis   [address]: [phone]  [facility]: [address] [place] [address] [address] [phone] - Crisis   [phone] - Peer Support Talk Line (1-9pm daily)  [phone] - Teen Support Talk Line (1-9pm daily)  [address] [place] [address] [phone] - 127 [facility]

## 2023-08-06 NOTE — ED NOTES
Patient declined to sign SLPF release for [facility] ([facility]) AT [place]

## 2023-08-06 NOTE — ED PROVIDER NOTES
History  Chief Complaint   Patient presents with   • Psychiatric Evaluation     EMS from [facility]; SI due to argument with boyfriend, no plan, no HI     27-year-old female with history of anxiety, bipolar 1 disorder, borderline personality disorder, CAD, cognitive impairments, pretension, obesity, PTSD seizure disorder, schizoaffective disorder was brought by ambulance from [facility]. She states that she plans to take a tack off the wall to cut herself to kill herself. She is upset and has had suicidal ideation with this plan for the past 2 to 3 days. She states this is because there has been arguments with her romantic interest.  Patient also admits to chronic auditory hallucinations. These are from her boyfriend who listed her in the past.  She states there is no change in this auditory hallucination. Denies recent injury or illness. States she is compliant on her medications. Per our crisis worker patient does have a  health high utilizer plan          Prior to Admission Medications   Prescriptions Last Dose Informant Patient Reported? Taking? Cyanocobalamin (Vitamin B 12) 500 MCG TABS   No No   Sig: Take 1,000 mcg by mouth in the morning   FLUoxetine (PROzac) 40 MG capsule  Self No No   Sig: Take 1 capsule (40 mg total) by mouth daily Do not start before 2022.    aspirin 81 mg chewable tablet   No No   Sig: Chew 1 tablet (81 mg total) daily   atorvastatin (LIPITOR) 40 mg tablet   No No   Sig: Take 1 tablet (40 mg total) by mouth daily with dinner   benztropine (COGENTIN) 1 mg tablet   Yes No   Sig: Take 1 mg by mouth 2 (two) times a day   cholecalciferol (VITAMIN D3) 400 units tablet   No No   Sig: Take 1 tablet (400 Units total) by mouth daily   divalproex sodium (DEPAKOTE ER) 500 mg 24 hr tablet   No No   Sig: Take 4 tablets (2,000 mg total) by mouth daily at bedtime   fluticasone (FLONASE) 50 mcg/act nasal spray   No No   Si spray into each nostril daily Do not start before December 20, 2022.    folic acid (FOLVITE) 1 mg tablet   No No   Sig: Take 1 tablet (1 mg total) by mouth daily   haloperidol (HALDOL) 10 mg tablet   No No   Sig: Take 1 tablet (10 mg total) by mouth 2 (two) times a day   lisinopril (ZESTRIL) 20 mg tablet  Self No No   Sig: Take 1 tablet (20 mg total) by mouth daily   mirtazapine (REMERON) 45 MG tablet   No No   Sig: Take 1 tablet (45 mg total) by mouth daily at bedtime   ondansetron (Zofran ODT) 4 mg disintegrating tablet   No No   Sig: Take 1 tablet (4 mg total) by mouth every 6 (six) hours as needed for nausea or vomiting   Patient not taking: Reported on 2/26/2023   pantoprazole (PROTONIX) 20 mg tablet   No No   Sig: Take 1 tablet (20 mg total) by mouth daily   prazosin (MINIPRESS) 2 mg capsule   No No   Sig: Take 1 capsule (2 mg total) by mouth daily at bedtime   senna-docusate sodium (SENOKOT S) 8.6-50 mg per tablet   No No   Sig: Take 1 tablet by mouth 2 (two) times a day   traZODone (DESYREL) 100 mg tablet   No No   Sig: Take 1 tablet (100 mg total) by mouth daily at bedtime   ziprasidone (GEODON) 40 mg capsule   No No   Sig: Take 1 capsule (40 mg total) by mouth 2 (two) times a day with meals      Facility-Administered Medications: None       Past Medical History:   Diagnosis Date   • Anxiety    • Bipolar 1 disorder (HCC)    • Bipolar affective disorder, depressed, severe ([facility]) 10/10/2021   • Borderline personality disorder (HCC)    • CAD (coronary artery disease)    • Cognitive impairment    • Depression    • Dyslipidemia    • GERD (gastroesophageal reflux disease)    • Hypertension    • Obesity    • Psychiatric disorder    • Psychiatric illness    • PTSD (post-traumatic stress disorder)    • Schizoaffective disorder (HCC)    • Seizure ([facility])        Past Surgical History:   Procedure Laterality Date   • APPENDECTOMY      PATIENT DENIES HAVING APPENDIX REMOVED   • CHOLECYSTECTOMY     • FOOT SURGERY Right        Family History   Problem Relation Age of Onset   • Kidney cancer Mother    • Cerebral aneurysm Father      I have reviewed and agree with the history as documented. E-Cigarette/Vaping   • E-Cigarette Use Never User      E-Cigarette/Vaping Substances   • Nicotine No    • THC No    • CBD No    • Flavoring No    • Other No    • Unknown No      Social History     Tobacco Use   • Smoking status: Former   • Smokeless tobacco: Never   • Tobacco comments:     Pt stated "smokes when stressed"   Vaping Use   • Vaping Use: Never used   Substance Use Topics   • Alcohol use: Yes     Comment: occasionally   • Drug use: Not Currently       Review of Systems   Constitutional: Negative for fatigue and fever. Respiratory: Negative for cough and shortness of breath. Cardiovascular: Negative for chest pain. Gastrointestinal: Negative for abdominal pain, diarrhea and vomiting. Psychiatric/Behavioral: Positive for dysphoric mood and hallucinations. Physical Exam  Physical Exam  Vitals and nursing note reviewed. Constitutional:       General: She is not in acute distress. Appearance: She is well-developed. She is obese. She is not ill-appearing or diaphoretic. HENT:      Head: Normocephalic and atraumatic. Right Ear: External ear normal.      Left Ear: External ear normal.   Eyes:      General: No scleral icterus. Conjunctiva/sclera: Conjunctivae normal.      Pupils: Pupils are equal, round, and reactive to light. Cardiovascular:      Rate and Rhythm: Normal rate and regular rhythm. Pulses: Normal pulses. Heart sounds: Normal heart sounds. No murmur heard. Pulmonary:      Effort: Pulmonary effort is normal.      Breath sounds: Normal breath sounds. Abdominal:      General: Bowel sounds are normal.      Palpations: Abdomen is soft. Tenderness: There is no abdominal tenderness. Musculoskeletal:         General: Normal range of motion. Cervical back: Normal range of motion and neck supple.       Right lower leg: No edema. Left lower leg: No edema. Skin:     General: Skin is warm and dry. Capillary Refill: Capillary refill takes less than 2 seconds. Findings: No rash. Neurological:      General: No focal deficit present. Mental Status: She is alert and oriented to person, place, and time. Cranial Nerves: No cranial nerve deficit. Coordination: Coordination normal.      Deep Tendon Reflexes: Reflexes are normal and symmetric.    Psychiatric:         Mood and Affect: Mood normal.         Behavior: Behavior normal.         Vital Signs  ED Triage Vitals [08/06/23 1547]   Temperature Pulse Respirations Blood Pressure SpO2   98.3 °F (36.8 °C) 76 16 112/57 97 %      Temp Source Heart Rate Source Patient Position - Orthostatic VS BP Location FiO2 (%)   Oral Monitor Lying Right arm --      Pain Score       No Pain           Vitals:    08/06/23 1547 08/06/23 1713   BP: 112/57    Pulse: 76 76   Patient Position - Orthostatic VS: Lying          Visual Acuity      ED Medications  Medications - No data to display    Diagnostic Studies  Results Reviewed     Procedure Component Value Units Date/Time    Comprehensive metabolic panel [[id number]]  (Abnormal) Collected: 08/06/23 1636    Lab Status: Final result Specimen: Blood from Arm, Left Updated: 08/06/23 1806     Sodium 136 mmol/L      Potassium 4.1 mmol/L      Chloride 98 mmol/L      CO2 31 mmol/L      ANION GAP 7 mmol/L      BUN 14 mg/dL      Creatinine 0.57 mg/dL      Glucose 87 mg/dL      Calcium 9.4 mg/dL      AST 8 U/L      ALT 9 U/L      Alkaline Phosphatase 56 U/L      Total Protein 7.1 g/dL      Albumin 4.1 g/dL      Total Bilirubin 0.57 mg/dL      eGFR 106 ml/min/1.73sq m     Narrative:      [place] guidelines for Chronic Kidney Disease (CKD):   •  Stage 1 with normal or high GFR (GFR > 90 mL/min/1.73 square meters)  •  Stage 2 Mild CKD (GFR = 60-89 mL/min/1.73 square meters)  •  Stage 3A Moderate CKD (GFR = 45-59 mL/min/1.73 square meters)  •  Stage 3B Moderate CKD (GFR = 30-44 mL/min/1.73 square meters)  •  Stage 4 Severe CKD (GFR = 15-29 mL/min/1.73 square meters)  •  Stage 5 End Stage CKD (GFR <15 mL/min/1.73 square meters)  Note: GFR calculation is accurate only with a steady state creatinine    TSH [[id number]]  (Normal) Collected: 08/06/23 1636    Lab Status: Final result Specimen: Blood from Arm, Left Updated: 08/06/23 1719     TSH 3RD GENERATON 2.762 uIU/mL     Rapid drug screen, urine [[id number]]  (Normal) Collected: 08/06/23 1626    Lab Status: Final result Specimen: Urine, Clean Catch Updated: 08/06/23 1651     Amph/Meth UR Negative     Barbiturate Ur Negative     Benzodiazepine Urine Negative     Cocaine Urine Negative     Methadone Urine Negative     Opiate Urine Negative     PCP Ur Negative     THC Urine Negative     Oxycodone Urine Negative    Narrative:      FOR MEDICAL PURPOSES ONLY. IF CONFIRMATION NEEDED PLEASE CONTACT THE LAB WITHIN 5 DAYS.     Drug Screen Cutoff Levels:  AMPHETAMINE/METHAMPHETAMINES  1000 ng/mL  BARBITURATES     200 ng/mL  BENZODIAZEPINES     200 ng/mL  COCAINE      300 ng/mL  METHADONE      300 ng/mL  OPIATES      300 ng/mL  PHENCYCLIDINE     25 ng/mL  THC       50 ng/mL  OXYCODONE      100 ng/mL    CBC and differential [[id number]]  (Abnormal) Collected: 08/06/23 1636    Lab Status: Final result Specimen: Blood from Arm, Left Updated: 08/06/23 1641     WBC 7.36 Thousand/uL      RBC 3.77 Million/uL      Hemoglobin 11.9 g/dL      Hematocrit 35.4 %      MCV 94 fL      MCH 31.6 pg      MCHC 33.6 g/dL      RDW 14.0 %      MPV 9.0 fL      Platelets 277 Thousands/uL      nRBC 0 /100 WBCs      Neutrophils Relative 62 %      Immat GRANS % 1 %      Lymphocytes Relative 22 %      Monocytes Relative 10 %      Eosinophils Relative 5 %      Basophils Relative 0 %      Neutrophils Absolute 4.54 Thousands/µL      Immature Grans Absolute 0.08 Thousand/uL      Lymphocytes Absolute 1.58 Thousands/µL Monocytes Absolute 0.74 Thousand/µL      Eosinophils Absolute 0.40 Thousand/µL      Basophils Absolute 0.02 Thousands/µL     POCT pregnancy, urine [[id number]]  (Normal) Resulted: 08/06/23 1623    Lab Status: Final result Specimen: Urine Updated: 08/06/23 1624     EXT Preg Test, Ur Negative     Control Valid    Urine Macroscopic, POC [[id number]]  (Abnormal) Collected: 08/06/23 1621    Lab Status: Final result Specimen: Urine Updated: 08/06/23 1623     Color, UA Yellow     Clarity, UA Clear     pH, UA 5.5     Leukocytes, UA Small     Nitrite, UA Negative     Protein, UA Negative mg/dl      Glucose, UA Negative mg/dl      Ketones, UA Negative mg/dl      Urobilinogen, UA 4.0 E.U./dl      Bilirubin, UA Negative     Occult Blood, UA Trace     Specific [place], UA 1.025    Urine Microscopic [[id number]] Collected: 08/06/23 1621    Lab Status: No result Specimen: Urine, Clean Catch     POCT alcohol breath test [[id number]]  (Normal) Resulted: 08/06/23 1554    Lab Status: Final result Updated: 08/06/23 1556     EXTBreath Alcohol 0.00                 No orders to display              Procedures  ECG 12 Lead Documentation Only    Date/Time: 8/6/2023 4:01 PM    Performed by: Americo Dockery DO  Authorized by: Americo Dockery DO    ECG reviewed by me, the ED Provider: yes    Patient location:  ED  Previous ECG:     Previous ECG:  Compared to current    Similarity:  No change    Comparison to cardiac monitor: No    Interpretation:     Interpretation: normal               ED Course  ED Course as of 08/06/23 1913   [facility] Aug 06, 2023   1743 Telemedicine consult performed by psychiatry, Dr. Fleeta Kayser. He states patient can be discharged. Medical Decision Making  81-year-old female with well-documented behavioral health issues. Patient returns with her typical complaints including suicidal ideation. States that she wants to kill himself by cutting himself with a thumbtack.   This was triggered by an argument with her romantic interest at her congregate care setting. The high utilizer plan was reviewed. Patient was medically cleared. Patient was seen by crisis and evaluated by psychiatrist.  Is felt patient is safe for return to her group home setting. Has remained cooperative here. Has not expressed any further SI HI and is not acting as if responding to internal stimuli. She ate a meal while here and is waiting patiently for her ride home. Borderline personality disorder in [facility]): chronic illness or injury  Suicidal ideation: chronic illness or injury  Amount and/or Complexity of Data Reviewed  Independent Historian: EMS  External Data Reviewed: labs and notes. Labs: ordered. Decision-making details documented in ED Course. Discussion of management or test interpretation with external provider(s): Allan Farias        Disposition  Final diagnoses:   Suicidal ideation   Borderline personality disorder in [facility])     Time reflects when diagnosis was documented in both MDM as applicable and the Disposition within this note     Time User Action Codes Description Comment    8/6/2023  3:48 PM Juice Puga [L08.020] Suicidal ideation     8/6/2023  6:38 PM Juice Puga [F60.3] Borderline personality disorder in [facility])       ED Disposition     ED Disposition   Discharge    Condition   Stable    Date/Time   Sun Aug 6, 2023  6:38 PM    Comment   Maulik Payne discharge to home/self care. Follow-up Information     Follow up With Specialties Details Why [id number] [address] Kei, care supervisor at [employer]              Patient's Medications   Discharge Prescriptions    No medications on file       No discharge procedures on file.     PDMP Review       Value Time User    PDMP Reviewed  Yes 5/6/2022 10:38 AM Flora Mauricio MD          ED Provider  Electronically Signed by           Juice Huerta DO  08/06/23 [id number]

## 2023-08-06 NOTE — ED NOTES
Clinical coordinator ([address] [place] [address]) called [facility] and was not able to connect with staff.

## 2023-08-06 NOTE — ED NOTES
[employer] out for attending.   TAMIR consult requested before crisis eval for appropriateness of admission

## 2023-08-06 NOTE — ED NOTES
Per provider patient to have [id number] as appropriate monitoring, patient calm and cooperative.       Rosanna Jorge RN  08/06/23 4334

## 2023-08-10 ENCOUNTER — APPOINTMENT (EMERGENCY)
Dept: RADIOLOGY | Facility: HOSPITAL | Age: 53
End: 2023-08-10
Payer: MEDICARE

## 2023-08-10 ENCOUNTER — HOSPITAL ENCOUNTER (EMERGENCY)
Facility: HOSPITAL | Age: 53
Discharge: HOME | End: 2023-08-10
Attending: EMERGENCY MEDICINE
Payer: MEDICARE

## 2023-08-10 VITALS
OXYGEN SATURATION: 98 % | HEART RATE: 84 BPM | DIASTOLIC BLOOD PRESSURE: 64 MMHG | RESPIRATION RATE: 20 BRPM | TEMPERATURE: 98.7 F | SYSTOLIC BLOOD PRESSURE: 119 MMHG

## 2023-08-10 DIAGNOSIS — N39.0 URINARY TRACT INFECTION: Primary | ICD-10-CM

## 2023-08-10 DIAGNOSIS — R07.9 CHEST PAIN WITH LOW RISK OF ACUTE CORONARY SYNDROME: ICD-10-CM

## 2023-08-10 LAB
ALBUMIN SERPL BCP-MCNC: 4 G/DL (ref 3.5–5)
ALP SERPL-CCNC: 48 U/L (ref 34–104)
ALT SERPL W P-5'-P-CCNC: 6 U/L (ref 7–52)
ANION GAP SERPL CALCULATED.3IONS-SCNC: 5 MMOL/L
AST SERPL W P-5'-P-CCNC: 9 U/L (ref 13–39)
BACTERIA UR QL AUTO: ABNORMAL /HPF
BASOPHILS # BLD AUTO: 0.04 THOUSANDS/ÂΜL (ref 0–0.1)
BASOPHILS NFR BLD AUTO: 1 % (ref 0–1)
BILIRUB SERPL-MCNC: 0.48 MG/DL (ref 0.2–1)
BILIRUB UR QL STRIP: NEGATIVE
BUN SERPL-MCNC: 13 MG/DL (ref 5–25)
CALCIUM SERPL-MCNC: 9.3 MG/DL (ref 8.4–10.2)
CARDIAC TROPONIN I PNL SERPL HS: <2 NG/L
CHLORIDE SERPL-SCNC: 100 MMOL/L (ref 96–108)
CLARITY UR: CLEAR
CO2 SERPL-SCNC: 29 MMOL/L (ref 21–32)
COLOR UR: ABNORMAL
CREAT SERPL-MCNC: 0.55 MG/DL (ref 0.6–1.3)
EOSINOPHIL # BLD AUTO: 0.1 THOUSAND/ÂΜL (ref 0–0.61)
EOSINOPHIL NFR BLD AUTO: 1 % (ref 0–6)
ERYTHROCYTE [DISTWIDTH] IN BLOOD BY AUTOMATED COUNT: 13.9 % (ref 11.6–15.1)
GFR SERPL CREATININE-BSD FRML MDRD: 108 ML/MIN/1.73SQ M
GLUCOSE SERPL-MCNC: 55 MG/DL (ref 65–140)
GLUCOSE UR STRIP-MCNC: NEGATIVE MG/DL
HCT VFR BLD AUTO: 35 % (ref 34.8–46.1)
HGB BLD-MCNC: 11.5 G/DL (ref 11.5–15.4)
HGB UR QL STRIP.AUTO: NEGATIVE
IMM GRANULOCYTES # BLD AUTO: 0.1 THOUSAND/UL (ref 0–0.2)
IMM GRANULOCYTES NFR BLD AUTO: 1 % (ref 0–2)
KETONES UR STRIP-MCNC: NEGATIVE MG/DL
LEUKOCYTE ESTERASE UR QL STRIP: ABNORMAL
LYMPHOCYTES # BLD AUTO: 1.7 THOUSANDS/ÂΜL (ref 0.6–4.47)
LYMPHOCYTES NFR BLD AUTO: 23 % (ref 14–44)
MCH RBC QN AUTO: 30.9 PG (ref 26.8–34.3)
MCHC RBC AUTO-ENTMCNC: 32.9 G/DL (ref 31.4–37.4)
MCV RBC AUTO: 94 FL (ref 82–98)
MONOCYTES # BLD AUTO: 0.85 THOUSAND/ÂΜL (ref 0.17–1.22)
MONOCYTES NFR BLD AUTO: 11 % (ref 4–12)
NEUTROPHILS # BLD AUTO: 4.72 THOUSANDS/ÂΜL (ref 1.85–7.62)
NEUTS SEG NFR BLD AUTO: 63 % (ref 43–75)
NITRITE UR QL STRIP: NEGATIVE
NON-SQ EPI CELLS URNS QL MICRO: ABNORMAL /HPF
NRBC BLD AUTO-RTO: 0 /100 WBCS
PH UR STRIP.AUTO: 7 [PH]
PLATELET # BLD AUTO: 149 THOUSANDS/UL (ref 149–390)
PMV BLD AUTO: 9.5 FL (ref 8.9–12.7)
POTASSIUM SERPL-SCNC: 4 MMOL/L (ref 3.5–5.3)
PROT SERPL-MCNC: 6.9 G/DL (ref 6.4–8.4)
PROT UR STRIP-MCNC: NEGATIVE MG/DL
RBC # BLD AUTO: 3.72 MILLION/UL (ref 3.81–5.12)
RBC #/AREA URNS AUTO: ABNORMAL /HPF
SODIUM SERPL-SCNC: 134 MMOL/L (ref 135–147)
SP GR UR STRIP.AUTO: <1.005 (ref 1–1.03)
UROBILINOGEN UR STRIP-ACNC: <2 MG/DL
VALPROATE SERPL-MCNC: 13 UG/ML (ref 50–100)
WBC # BLD AUTO: 7.51 THOUSAND/UL (ref 4.31–10.16)
WBC #/AREA URNS AUTO: ABNORMAL /HPF

## 2023-08-10 PROCEDURE — 81001 URINALYSIS AUTO W/SCOPE: CPT

## 2023-08-10 PROCEDURE — 99285 EMERGENCY DEPT VISIT HI MDM: CPT

## 2023-08-10 PROCEDURE — 71045 X-RAY EXAM CHEST 1 VIEW: CPT

## 2023-08-10 PROCEDURE — 84484 ASSAY OF TROPONIN QUANT: CPT

## 2023-08-10 PROCEDURE — NC001 PR NO CHARGE

## 2023-08-10 PROCEDURE — 80164 ASSAY DIPROPYLACETIC ACD TOT: CPT

## 2023-08-10 PROCEDURE — 87086 URINE CULTURE/COLONY COUNT: CPT

## 2023-08-10 PROCEDURE — 93005 ELECTROCARDIOGRAM TRACING: CPT

## 2023-08-10 PROCEDURE — 80053 COMPREHEN METABOLIC PANEL: CPT

## 2023-08-10 PROCEDURE — 36415 COLL VENOUS BLD VENIPUNCTURE: CPT

## 2023-08-10 PROCEDURE — 85025 COMPLETE CBC W/AUTO DIFF WBC: CPT

## 2023-08-10 RX ORDER — CEPHALEXIN 250 MG/1
500 CAPSULE ORAL ONCE
Status: COMPLETED | OUTPATIENT
Start: 2023-08-10 | End: 2023-08-10

## 2023-08-10 RX ORDER — CEPHALEXIN 500 MG/1
500 CAPSULE ORAL EVERY 12 HOURS SCHEDULED
Qty: 14 CAPSULE | Refills: 0 | Status: SHIPPED | OUTPATIENT
Start: 2023-08-10 | End: 2023-08-17

## 2023-08-10 RX ORDER — LIDOCAINE HYDROCHLORIDE AND EPINEPHRINE 10; 10 MG/ML; UG/ML
5 INJECTION, SOLUTION INFILTRATION; PERINEURAL ONCE
Status: DISCONTINUED | OUTPATIENT
Start: 2023-08-10 | End: 2023-08-10

## 2023-08-10 RX ADMIN — CEPHALEXIN 500 MG: 250 CAPSULE ORAL at 20:47

## 2023-08-10 NOTE — ED PROVIDER NOTES
History  Chief Complaint   Patient presents with   • Chest Pain   • Breast Pain     Patient c/o chest pain and bilateral breast pain x 3 days. Vitals stable. Patient ambulatory to ambulance      This is a 48 y/o female with PMH anxiety, bipolar, CAD, HTN, HLD, PTSD who presents to the ER for chest pain x 3 days. Patient says that she noticed it gradually starting and says her entire chest wall and both breasts hurt to the touch. Denies any heavy lifting, injuries, trauma, falls, recent coughing. Says its a "10/10." denies anything making it better or worse such eating, breathing. Denies any difficulty breathing, abdominal pain, nausea, vomiting, coughing up blood, fevers, chills, back pain, numbness, tingling. Patient lives at a facility. Has not taken any medications for this. Admits to having this in the past but says it usually doesn't last this long. Patient is frequently at this emergency department for a wide number of chief complaints. History provided by:  Patient   used: No        Prior to Admission Medications   Prescriptions Last Dose Informant Patient Reported? Taking? Cyanocobalamin (Vitamin B 12) 500 MCG TABS   No No   Sig: Take 1,000 mcg by mouth in the morning   FLUoxetine (PROzac) 40 MG capsule  Self No No   Sig: Take 1 capsule (40 mg total) by mouth daily Do not start before 2022.    aspirin 81 mg chewable tablet   No No   Sig: Chew 1 tablet (81 mg total) daily   atorvastatin (LIPITOR) 40 mg tablet   No No   Sig: Take 1 tablet (40 mg total) by mouth daily with dinner   benztropine (COGENTIN) 1 mg tablet   Yes No   Sig: Take 1 mg by mouth 2 (two) times a day   cholecalciferol (VITAMIN D3) 400 units tablet   No No   Sig: Take 1 tablet (400 Units total) by mouth daily   divalproex sodium (DEPAKOTE ER) 500 mg 24 hr tablet   No No   Sig: Take 4 tablets (2,000 mg total) by mouth daily at bedtime   fluticasone (FLONASE) 50 mcg/act nasal spray   No No   Si spray into each nostril daily Do not start before December 20, 2022.    folic acid (FOLVITE) 1 mg tablet   No No   Sig: Take 1 tablet (1 mg total) by mouth daily   haloperidol (HALDOL) 10 mg tablet   No No   Sig: Take 1 tablet (10 mg total) by mouth 2 (two) times a day   lisinopril (ZESTRIL) 20 mg tablet  Self No No   Sig: Take 1 tablet (20 mg total) by mouth daily   mirtazapine (REMERON) 45 MG tablet   No No   Sig: Take 1 tablet (45 mg total) by mouth daily at bedtime   ondansetron (Zofran ODT) 4 mg disintegrating tablet   No No   Sig: Take 1 tablet (4 mg total) by mouth every 6 (six) hours as needed for nausea or vomiting   Patient not taking: Reported on 2/26/2023   pantoprazole (PROTONIX) 20 mg tablet   No No   Sig: Take 1 tablet (20 mg total) by mouth daily   prazosin (MINIPRESS) 2 mg capsule   No No   Sig: Take 1 capsule (2 mg total) by mouth daily at bedtime   senna-docusate sodium (SENOKOT S) 8.6-50 mg per tablet   No No   Sig: Take 1 tablet by mouth 2 (two) times a day   traZODone (DESYREL) 100 mg tablet   No No   Sig: Take 1 tablet (100 mg total) by mouth daily at bedtime   ziprasidone (GEODON) 40 mg capsule   No No   Sig: Take 1 capsule (40 mg total) by mouth 2 (two) times a day with meals      Facility-Administered Medications: None       Past Medical History:   Diagnosis Date   • Anxiety    • Bipolar 1 disorder (HCC)    • Bipolar affective disorder, depressed, severe ([facility]) 10/10/2021   • Borderline personality disorder (HCC)    • CAD (coronary artery disease)    • Cognitive impairment    • Depression    • Dyslipidemia    • GERD (gastroesophageal reflux disease)    • Hypertension    • Obesity    • Psychiatric disorder    • Psychiatric illness    • PTSD (post-traumatic stress disorder)    • Schizoaffective disorder (HCC)    • Seizure ([facility])        Past Surgical History:   Procedure Laterality Date   • APPENDECTOMY      PATIENT DENIES HAVING APPENDIX REMOVED   • CHOLECYSTECTOMY     • FOOT SURGERY Right Family History   Problem Relation Age of Onset   • Kidney cancer Mother    • Cerebral aneurysm Father      I have reviewed and agree with the history as documented. E-Cigarette/Vaping   • E-Cigarette Use Never User      E-Cigarette/Vaping Substances   • Nicotine No    • THC No    • CBD No    • Flavoring No    • Other No    • Unknown No      Social History     Tobacco Use   • Smoking status: Former   • Smokeless tobacco: Never   • Tobacco comments:     Pt stated "smokes when stressed"   Vaping Use   • Vaping Use: Never used   Substance Use Topics   • Alcohol use: Yes     Comment: occasionally   • Drug use: Not Currently       Review of Systems   Constitutional: Negative for chills and fever. Respiratory: Negative for shortness of breath. Cardiovascular: Positive for chest pain. Gastrointestinal: Negative for abdominal pain, nausea and vomiting. Skin: Negative for color change. Neurological: Negative for dizziness, weakness, light-headedness, numbness and headaches. Psychiatric/Behavioral: Negative for behavioral problems and sleep disturbance. All other systems reviewed and are negative. Physical Exam  Physical Exam  Vitals and nursing note reviewed. Constitutional:       General: She is awake. Appearance: Normal appearance. She is well-developed. She is obese. HENT:      Head: Normocephalic and atraumatic. Right Ear: External ear normal.      Left Ear: External ear normal.      Nose: Nose normal.   Eyes:      General: No scleral icterus. Extraocular Movements: Extraocular movements intact. Cardiovascular:      Rate and Rhythm: Normal rate and regular rhythm. Heart sounds: Normal heart sounds, S1 normal and S2 normal. No murmur heard. No gallop. Pulmonary:      Effort: Pulmonary effort is normal.      Breath sounds: Normal breath sounds. No wheezing, rhonchi or rales. Chest:      Chest wall: Tenderness present.       Comments: Diffuse tenderness with palpation to chest wall. No visible bruising, swelling, redness, crepitus or deformities noted. Musculoskeletal:         General: Normal range of motion. Cervical back: Normal range of motion. Skin:     General: Skin is warm and dry. Neurological:      General: No focal deficit present. Mental Status: She is alert. Psychiatric:         Attention and Perception: Attention and perception normal.         Mood and Affect: Mood normal.         Behavior: Behavior normal. Behavior is cooperative. Vital Signs  ED Triage Vitals [08/10/23 1845]   Temperature Pulse Respirations Blood Pressure SpO2   98.7 °F (37.1 °C) 84 20 119/64 98 %      Temp Source Heart Rate Source Patient Position - Orthostatic VS BP Location FiO2 (%)   Oral Monitor Lying Right arm --      Pain Score       10 - Worst Possible Pain           Vitals:    08/10/23 1845   BP: 119/64   Pulse: 84   Patient Position - Orthostatic VS: Lying         Visual Acuity      ED Medications  Medications   cephalexin (KEFLEX) capsule 500 mg (500 mg Oral Given 8/10/23 2047)       Diagnostic Studies  Results Reviewed     Procedure Component Value Units Date/Time    Urine Microscopic [[id number]]  (Abnormal) Collected: 08/10/23 1951    Lab Status: Final result Specimen: Urine, Clean Catch Updated: 08/10/23 2034     RBC, UA None Seen /hpf      WBC, UA 10-20 /hpf      Epithelial Cells Occasional /hpf      Bacteria, UA Occasional /hpf     Urine culture [[id number]] Collected: 08/10/23 1951    Lab Status:  In process Specimen: Urine, Clean Catch Updated: 08/10/23 2034    UA w Reflex to Microscopic w Reflex to Culture [[id number]]  (Abnormal) Collected: 08/10/23 1951    Lab Status: Final result Specimen: Urine, Clean Catch Updated: 08/10/23 2026     Color, UA Light Yellow     Clarity, UA Clear     Specific Gravity, UA <1.005     pH, UA 7.0     Leukocytes, UA Large     Nitrite, UA Negative     Protein, UA Negative mg/dl      Glucose, UA Negative mg/dl Ketones, UA Negative mg/dl      Urobilinogen, UA <2.0 mg/dl      Bilirubin, UA Negative     Occult Blood, UA Negative    HS Troponin 0hr (reflex protocol) [[id number]]  (Normal) Collected: 08/10/23 1909    Lab Status: Final result Specimen: Blood from Arm, Right Updated: 08/10/23 1938     hs TnI 0hr <2 ng/L     Comprehensive metabolic panel [[id number]]  (Abnormal) Collected: 08/10/23 1909    Lab Status: Final result Specimen: Blood from Arm, Right Updated: 08/10/23 1931     Sodium 134 mmol/L      Potassium 4.0 mmol/L      Chloride 100 mmol/L      CO2 29 mmol/L      ANION GAP 5 mmol/L      BUN 13 mg/dL      Creatinine 0.55 mg/dL      Glucose 55 mg/dL      Calcium 9.3 mg/dL      AST 9 U/L      ALT 6 U/L      Alkaline Phosphatase 48 U/L      Total Protein 6.9 g/dL      Albumin 4.0 g/dL      Total Bilirubin 0.48 mg/dL      eGFR 108 ml/min/1.73sq m     Narrative:      [place] guidelines for Chronic Kidney Disease (CKD):   •  Stage 1 with normal or high GFR (GFR > 90 mL/min/1.73 square meters)  •  Stage 2 Mild CKD (GFR = 60-89 mL/min/1.73 square meters)  •  Stage 3A Moderate CKD (GFR = 45-59 mL/min/1.73 square meters)  •  Stage 3B Moderate CKD (GFR = 30-44 mL/min/1.73 square meters)  •  Stage 4 Severe CKD (GFR = 15-29 mL/min/1.73 square meters)  •  Stage 5 End Stage CKD (GFR <15 mL/min/1.73 square meters)  Note: GFR calculation is accurate only with a steady state creatinine    Valproic acid level, total [[id number]]  (Abnormal) Collected: 08/10/23 1909    Lab Status: Final result Specimen: Blood from Arm, Right Updated: 08/10/23 1931     Valproic Acid, Total 13 ug/mL     CBC and differential [[id number]]  (Abnormal) Collected: 08/10/23 1909    Lab Status: Final result Specimen: Blood from Arm, Right Updated: 08/10/23 1915     WBC 7.51 Thousand/uL      RBC 3.72 Million/uL      Hemoglobin 11.5 g/dL      Hematocrit 35.0 %      MCV 94 fL      MCH 30.9 pg      MCHC 32.9 g/dL      RDW 13.9 %      MPV 9.5 fL      Platelets 884 Thousands/uL      nRBC 0 /100 WBCs      Neutrophils Relative 63 %      Immat GRANS % 1 %      Lymphocytes Relative 23 %      Monocytes Relative 11 %      Eosinophils Relative 1 %      Basophils Relative 1 %      Neutrophils Absolute 4.72 Thousands/µL      Immature Grans Absolute 0.10 Thousand/uL      Lymphocytes Absolute 1.70 Thousands/µL      Monocytes Absolute 0.85 Thousand/µL      Eosinophils Absolute 0.10 Thousand/µL      Basophils Absolute 0.04 Thousands/µL                  XR chest 1 view portable   ED Interpretation by Shelly Ying PA-C (08/10 1927)   No acute cardiopulmonary disease. Similar to CXR 2/26/23                 Procedures  ECG 12 Lead Documentation Only    Date/Time: 8/10/2023 6:50 PM    Performed by: Shelly Ying PA-C  Authorized by: Shelly Ying PA-C    Indications / Diagnosis:  Chest pain  ECG reviewed by me, the ED Provider: yes    Patient location:  ED  Previous ECG:     Previous ECG:  Compared to current    Comparison ECG info:  Normal sinus rhythm with no ST elevations/depressions and when compared to EKG 8/6/23 no changes found    Similarity:  No change  Interpretation:     Interpretation: normal    Rate:     ECG rate:  85    ECG rate assessment: normal    Rhythm:     Rhythm: sinus rhythm    Ectopy:     Ectopy: none    ST segments:     ST segments:  Normal             ED Course             HEART Risk Score    Flowsheet Row Most Recent Value   Heart Score Risk Calculator    History 0 Filed at: 08/10/2023 2233   ECG 0 Filed at: 08/10/2023 2233   Age 1 Filed at: 08/10/2023 2233   Risk Factors 1 Filed at: 08/10/2023 2233   Troponin 0 Filed at: 08/10/2023 2233   HEART Score 2 Filed at: 08/10/2023 2233                        SBIRT 20yo+    Flowsheet Row Most Recent Value   Initial Alcohol Screen: US AUDIT-C     1. How often do you have a drink containing alcohol? 0 Filed at: 08/10/2023 1844   2.  How many drinks containing alcohol do you have on a typical day you are drinking? 0 Filed at: 08/10/2023 1844   3a. Male UNDER 65: How often do you have five or more drinks on one occasion? 0 Filed at: 08/10/2023 1844   3b. FEMALE Any Age, or MALE 65+: How often do you have 4 or more drinks on one occassion? 0 Filed at: 08/10/2023 1844   Audit-C Score 0 Filed at: 08/10/2023 1844   DAT: How many times in the past year have you. .. Used an illegal drug or used a prescription medication for non-medical reasons? Never Filed at: 08/10/2023 1844                    Medical Decision Making  48 y/o female here for chest pain  Differential diagnosis including but not limited to: ACS, arrhythmia, costochondritis, pneumonia, muscular strain   Assessment: chest pain low risk of ACS, UTI  Plan: heart score of 2. Suspect muscular in etiology. Supportive care. UTI found on labs - will start keflex. Sent patient back with printed prescription since she is from a facility. She  was given strict return to ER precautions both verbally and in discharge papers. Patient verbalized understanding and agrees with plan. Amount and/or Complexity of Data Reviewed  Labs: ordered. Radiology: ordered and independent interpretation performed. Risk  Prescription drug management. Disposition  Final diagnoses:   Urinary tract infection   Chest pain with low risk of acute coronary syndrome     Time reflects when diagnosis was documented in both MDM as applicable and the Disposition within this note     Time User Action Codes Description Comment    8/10/2023  9:05 PM Cassandra Been Add [N39.0] Urinary tract infection     8/10/2023  9:05 PM Cassandra Been Add [R07.9] Chest pain with low risk of acute coronary syndrome       ED Disposition     ED Disposition   Discharge    Condition   Stable    Date/Time   Thu Aug 10, 2023  9:05 PM    Comment   Leydi Khan discharge to home/self care.                Follow-up Information    None         Patient's Medications   Discharge Prescriptions CEPHALEXIN (KEFLEX) 500 MG CAPSULE    Take 1 capsule (500 mg total) by mouth every 12 (twelve) hours for 7 days       Start Date: 8/10/2023 End Date: 8/17/2023       Order Dose: 500 mg       Quantity: 14 capsule    Refills: 0       No discharge procedures on file.     PDMP Review       Value Time User    PDMP Reviewed  Yes 5/6/2022 10:38 AM Rollene Aschoff, MD          ED Provider  Electronically Signed by           Lee Benavides PA-C  08/10/23 7274

## 2023-08-11 LAB
ATRIAL RATE: 85 BPM
P AXIS: 49 DEGREES
PR INTERVAL: 194 MS
QRS AXIS: 26 DEGREES
QRSD INTERVAL: 78 MS
QT INTERVAL: 356 MS
QTC INTERVAL: 423 MS
T WAVE AXIS: 52 DEGREES
VENTRICULAR RATE: 85 BPM

## 2023-08-11 PROCEDURE — 93010 ELECTROCARDIOGRAM REPORT: CPT | Performed by: INTERNAL MEDICINE

## 2023-08-11 NOTE — DISCHARGE INSTRUCTIONS
Take keflex 500 mg every 12 hours for one week for UTI  Take ibuprofen or tylenol every 4-6 hours as needed for pain   Follow up with PCP  Return to the ER if you develop sudden chest pain, radiating to left arm or jaw, cant stop vomiting, palpitations, fevers, back pain, abdominal pain

## 2023-08-13 LAB — BACTERIA UR CULT: NORMAL

## 2023-08-26 ENCOUNTER — HOSPITAL ENCOUNTER (EMERGENCY)
Facility: HOSPITAL | Age: 53
Discharge: HOME | End: 2023-08-27
Attending: EMERGENCY MEDICINE
Payer: MEDICARE

## 2023-08-26 ENCOUNTER — APPOINTMENT (EMERGENCY)
Dept: RADIOLOGY | Facility: HOSPITAL | Age: 53
End: 2023-08-26
Payer: MEDICARE

## 2023-08-26 DIAGNOSIS — M94.0 COSTOCHONDRITIS: ICD-10-CM

## 2023-08-26 DIAGNOSIS — R42 VERTIGO: ICD-10-CM

## 2023-08-26 DIAGNOSIS — R07.9 CHEST PAIN: Primary | ICD-10-CM

## 2023-08-26 DIAGNOSIS — R45.851 SUICIDAL IDEATION: ICD-10-CM

## 2023-08-26 LAB
ALBUMIN SERPL BCP-MCNC: 4.2 G/DL (ref 3.5–5)
ALP SERPL-CCNC: 50 U/L (ref 34–104)
ALT SERPL W P-5'-P-CCNC: 7 U/L (ref 7–52)
AMPHETAMINES SERPL QL SCN: NEGATIVE
ANION GAP SERPL CALCULATED.3IONS-SCNC: 7 MMOL/L
AST SERPL W P-5'-P-CCNC: 7 U/L (ref 13–39)
ATRIAL RATE: 81 BPM
BACTERIA UR QL AUTO: NORMAL /HPF
BARBITURATES UR QL: NEGATIVE
BASOPHILS # BLD AUTO: 0.02 THOUSANDS/ÂΜL (ref 0–0.1)
BASOPHILS NFR BLD AUTO: 0 % (ref 0–1)
BENZODIAZ UR QL: NEGATIVE
BILIRUB SERPL-MCNC: 0.49 MG/DL (ref 0.2–1)
BILIRUB UR QL STRIP: NEGATIVE
BUN SERPL-MCNC: 10 MG/DL (ref 5–25)
CALCIUM SERPL-MCNC: 9.5 MG/DL (ref 8.4–10.2)
CARDIAC TROPONIN I PNL SERPL HS: <2 NG/L
CHLORIDE SERPL-SCNC: 98 MMOL/L (ref 96–108)
CLARITY UR: CLEAR
CO2 SERPL-SCNC: 29 MMOL/L (ref 21–32)
COCAINE UR QL: NEGATIVE
COLOR UR: YELLOW
CREAT SERPL-MCNC: 0.48 MG/DL (ref 0.6–1.3)
EOSINOPHIL # BLD AUTO: 0.25 THOUSAND/ÂΜL (ref 0–0.61)
EOSINOPHIL NFR BLD AUTO: 3 % (ref 0–6)
ERYTHROCYTE [DISTWIDTH] IN BLOOD BY AUTOMATED COUNT: 13.9 % (ref 11.6–15.1)
ETHANOL EXG-MCNC: 0 MG/DL
FLUAV RNA RESP QL NAA+PROBE: NEGATIVE
FLUBV RNA RESP QL NAA+PROBE: NEGATIVE
GFR SERPL CREATININE-BSD FRML MDRD: 113 ML/MIN/1.73SQ M
GLUCOSE SERPL-MCNC: 87 MG/DL (ref 65–140)
GLUCOSE UR STRIP-MCNC: NEGATIVE MG/DL
HCT VFR BLD AUTO: 35.2 % (ref 34.8–46.1)
HGB BLD-MCNC: 11.7 G/DL (ref 11.5–15.4)
HGB UR QL STRIP.AUTO: NEGATIVE
IMM GRANULOCYTES # BLD AUTO: 0.07 THOUSAND/UL (ref 0–0.2)
IMM GRANULOCYTES NFR BLD AUTO: 1 % (ref 0–2)
KETONES UR STRIP-MCNC: NEGATIVE MG/DL
LEUKOCYTE ESTERASE UR QL STRIP: ABNORMAL
LYMPHOCYTES # BLD AUTO: 2.17 THOUSANDS/ÂΜL (ref 0.6–4.47)
LYMPHOCYTES NFR BLD AUTO: 26 % (ref 14–44)
MCH RBC QN AUTO: 30.7 PG (ref 26.8–34.3)
MCHC RBC AUTO-ENTMCNC: 33.2 G/DL (ref 31.4–37.4)
MCV RBC AUTO: 92 FL (ref 82–98)
MONOCYTES # BLD AUTO: 0.86 THOUSAND/ÂΜL (ref 0.17–1.22)
MONOCYTES NFR BLD AUTO: 10 % (ref 4–12)
NEUTROPHILS # BLD AUTO: 5.01 THOUSANDS/ÂΜL (ref 1.85–7.62)
NEUTS SEG NFR BLD AUTO: 60 % (ref 43–75)
NITRITE UR QL STRIP: NEGATIVE
NON-SQ EPI CELLS URNS QL MICRO: NORMAL /HPF
NRBC BLD AUTO-RTO: 0 /100 WBCS
OPIATES UR QL SCN: NEGATIVE
OXYCODONE+OXYMORPHONE UR QL SCN: NEGATIVE
P AXIS: 63 DEGREES
PCP UR QL: NEGATIVE
PH UR STRIP.AUTO: 5 [PH]
PLATELET # BLD AUTO: 154 THOUSANDS/UL (ref 149–390)
PMV BLD AUTO: 9.4 FL (ref 8.9–12.7)
POTASSIUM SERPL-SCNC: 3.5 MMOL/L (ref 3.5–5.3)
PR INTERVAL: 218 MS
PROT SERPL-MCNC: 6.9 G/DL (ref 6.4–8.4)
PROT UR STRIP-MCNC: NEGATIVE MG/DL
QRS AXIS: 51 DEGREES
QRSD INTERVAL: 84 MS
QT INTERVAL: 378 MS
QTC INTERVAL: 439 MS
RBC # BLD AUTO: 3.81 MILLION/UL (ref 3.81–5.12)
RBC #/AREA URNS AUTO: NORMAL /HPF
RSV RNA RESP QL NAA+PROBE: NEGATIVE
SARS-COV-2 RNA RESP QL NAA+PROBE: NEGATIVE
SODIUM SERPL-SCNC: 134 MMOL/L (ref 135–147)
SP GR UR STRIP.AUTO: 1.01 (ref 1–1.03)
T WAVE AXIS: 52 DEGREES
THC UR QL: NEGATIVE
TSH SERPL DL<=0.05 MIU/L-ACNC: 3.29 UIU/ML (ref 0.45–4.5)
UROBILINOGEN UR STRIP-ACNC: <2 MG/DL
VENTRICULAR RATE: 81 BPM
WBC # BLD AUTO: 8.38 THOUSAND/UL (ref 4.31–10.16)
WBC #/AREA URNS AUTO: NORMAL /HPF

## 2023-08-26 PROCEDURE — 84484 ASSAY OF TROPONIN QUANT: CPT | Performed by: EMERGENCY MEDICINE

## 2023-08-26 PROCEDURE — 36415 COLL VENOUS BLD VENIPUNCTURE: CPT | Performed by: EMERGENCY MEDICINE

## 2023-08-26 PROCEDURE — 0241U HB NFCT DS VIR RESP RNA 4 TRGT: CPT | Performed by: EMERGENCY MEDICINE

## 2023-08-26 PROCEDURE — 71045 X-RAY EXAM CHEST 1 VIEW: CPT

## 2023-08-26 PROCEDURE — 81001 URINALYSIS AUTO W/SCOPE: CPT | Performed by: EMERGENCY MEDICINE

## 2023-08-26 PROCEDURE — 85025 COMPLETE CBC W/AUTO DIFF WBC: CPT | Performed by: EMERGENCY MEDICINE

## 2023-08-26 PROCEDURE — 96374 THER/PROPH/DIAG INJ IV PUSH: CPT

## 2023-08-26 PROCEDURE — 80053 COMPREHEN METABOLIC PANEL: CPT | Performed by: EMERGENCY MEDICINE

## 2023-08-26 PROCEDURE — 99285 EMERGENCY DEPT VISIT HI MDM: CPT | Performed by: EMERGENCY MEDICINE

## 2023-08-26 PROCEDURE — 80307 DRUG TEST PRSMV CHEM ANLYZR: CPT | Performed by: EMERGENCY MEDICINE

## 2023-08-26 PROCEDURE — 84443 ASSAY THYROID STIM HORMONE: CPT | Performed by: EMERGENCY MEDICINE

## 2023-08-26 PROCEDURE — 93005 ELECTROCARDIOGRAM TRACING: CPT

## 2023-08-26 PROCEDURE — 82075 ASSAY OF BREATH ETHANOL: CPT | Performed by: EMERGENCY MEDICINE

## 2023-08-26 PROCEDURE — 93010 ELECTROCARDIOGRAM REPORT: CPT | Performed by: INTERNAL MEDICINE

## 2023-08-26 PROCEDURE — 99285 EMERGENCY DEPT VISIT HI MDM: CPT

## 2023-08-26 PROCEDURE — G0427 INPT/ED TELECONSULT70: HCPCS | Performed by: GENERAL PRACTICE

## 2023-08-26 RX ORDER — ONDANSETRON 2 MG/ML
1 INJECTION INTRAMUSCULAR; INTRAVENOUS ONCE
Status: COMPLETED | OUTPATIENT
Start: 2023-08-26 | End: 2023-08-26

## 2023-08-26 RX ORDER — KETOROLAC TROMETHAMINE 30 MG/ML
15 INJECTION, SOLUTION INTRAMUSCULAR; INTRAVENOUS ONCE
Status: COMPLETED | OUTPATIENT
Start: 2023-08-26 | End: 2023-08-26

## 2023-08-26 RX ORDER — MECLIZINE HCL 12.5 MG/1
25 TABLET ORAL ONCE
Status: COMPLETED | OUTPATIENT
Start: 2023-08-26 | End: 2023-08-26

## 2023-08-26 RX ORDER — DIAZEPAM 5 MG/1
5 TABLET ORAL ONCE
Status: COMPLETED | OUTPATIENT
Start: 2023-08-26 | End: 2023-08-26

## 2023-08-26 RX ADMIN — KETOROLAC TROMETHAMINE 15 MG: 30 INJECTION, SOLUTION INTRAMUSCULAR; INTRAVENOUS at 21:12

## 2023-08-26 RX ADMIN — DIAZEPAM 5 MG: 5 TABLET ORAL at 22:50

## 2023-08-26 RX ADMIN — MECLIZINE 25 MG: 12.5 TABLET ORAL at 22:50

## 2023-08-27 VITALS
RESPIRATION RATE: 15 BRPM | HEART RATE: 72 BPM | DIASTOLIC BLOOD PRESSURE: 74 MMHG | HEIGHT: 66 IN | WEIGHT: 210 LBS | OXYGEN SATURATION: 97 % | SYSTOLIC BLOOD PRESSURE: 146 MMHG | BODY MASS INDEX: 33.75 KG/M2 | TEMPERATURE: 98.7 F

## 2023-08-27 NOTE — ED NOTES
Interval History: NAEON. Afebrile. HDS. Reports feeling better this morning. Denies n/v, passing flatus. NGT with 1650 out in 24 hours, 500 overnight. Pain is controlled with current regimen. Adequate UOP. No new symptoms or concerns this morning. Wife at bedside. All questions answered.       Medications:  Continuous Infusions:   lactated ringers 125 mL/hr at 07/03/22 1430     Scheduled Meds:   diatrizoate meglumineand-diatrizoate sodium  50 mL Per NG tube Once    enoxaparin  40 mg Subcutaneous Daily    metoprolol  5 mg Intravenous Q12H     PRN Meds:acetaminophen, HYDROmorphone, HYDROmorphone, ondansetron, sodium chloride 0.9%     Review of patient's allergies indicates:   Allergen Reactions    Novocain [procaine] Shortness Of Breath           Objective:     Vital Signs (Most Recent):  Temp: 98.1 °F (36.7 °C) (07/05/22 0545)  Pulse: 88 (07/05/22 0545)  Resp: 16 (07/05/22 0545)  BP: (!) 153/84 (07/05/22 0545)  SpO2: (!) 92 % (07/05/22 0545)   Vital Signs (24h Range):  Temp:  [97.6 °F (36.4 °C)-98.1 °F (36.7 °C)] 98.1 °F (36.7 °C)  Pulse:  [78-88] 88  Resp:  [16-18] 16  SpO2:  [88 %-97 %] 92 %  BP: (129-164)/(72-91) 153/84     Weight: 86.2 kg (190 lb)  Body mass index is 29.75 kg/m².    Intake/Output - Last 3 Shifts         07/03 0700  07/04 0659 07/04 0700 07/05 0659 07/05 0700 07/06 0659    IV Piggyback 1698.3      Total Intake(mL/kg) 1698.3 (19.7)      Urine (mL/kg/hr) 200 650 (0.3)     Drains 1600 1650     Total Output 1800 2300     Net -101.7 -2300            Urine Occurrence  1 x             Physical Exam  Vitals and nursing note reviewed.   Constitutional:       General: He is not in acute distress.     Comments: Room air  NGT to LIWS   HENT:      Head: Normocephalic and atraumatic.      Nose: Nose normal.      Mouth/Throat:      Mouth: Mucous membranes are moist.      Pharynx: Oropharynx is clear.   Eyes:      Extraocular Movements: Extraocular movements intact.      Conjunctiva/sclera: Conjunctivae normal.  TAMIR psych eval requested due to high utilizer. Pt added to que.   Cardiovascular:      Rate and Rhythm: Normal rate.   Pulmonary:      Effort: Pulmonary effort is normal. No respiratory distress.   Abdominal:      Palpations: Abdomen is soft.      Tenderness: There is no guarding or rebound.      Comments: Soft, non-tender. Less distension than day prior. No peritonitic signs   Musculoskeletal:         General: No deformity.   Skin:     General: Skin is warm and dry.   Neurological:      General: No focal deficit present.      Mental Status: He is alert.       Significant Labs:  I have reviewed all pertinent lab results within the past 24 hours.  CBC:   Recent Labs   Lab 07/05/22  0242   WBC 7.94   RBC 4.38*   HGB 13.9*   HCT 41.7      MCV 95   MCH 31.7*   MCHC 33.3       CMP:   Recent Labs   Lab 07/05/22  0242   GLU 84   CALCIUM 8.7   ALBUMIN 2.9*   PROT 5.9*      K 3.7   CO2 32*   CL 94*   BUN 17   CREATININE 0.8   ALKPHOS 82   ALT 41   AST 48*   BILITOT 1.8*         Significant Diagnostics:  I have reviewed all pertinent imaging results/findings within the past 24 hours.

## 2023-08-27 NOTE — ED CARE HANDOFF
Emergency Department Sign Out Note        Sign out and transfer of care from Dr Pamela Martinez. See Separate Emergency Department note. The patient, Enriqueta Banks, was evaluated by the previous provider for dizziness and suicidal ideation. Workup Completed:  EKG and troponin unremarkable    ED Course / Workup Pending (followup): Patient was evaluated by psychiatrist Dr. Kendrick Manzanares and cleared for discharge outpatient follow-up.   She continues to complain of some dizziness we will give her a dose of Valium for now and she can also be follow-up with her primary care physician      HEART Risk Score    Flowsheet Row Most Recent Value   Heart Score Risk Calculator    History 0 Filed at: 08/26/2023 2031   ECG 1 Filed at: 08/26/2023 2031   Age 1 Filed at: 08/26/2023 2031   Risk Factors 2 Filed at: 08/26/2023 2031   Troponin 0 Filed at: 08/26/2023 2031   HEART Score 4 Filed at: 08/26/2023 2031                                     Procedures  MDM        Disposition  Final diagnoses:   Chest pain   Costochondritis   Suicidal ideation   Vertigo     Time reflects when diagnosis was documented in both MDM as applicable and the Disposition within this note     Time User Action Codes Description Comment    8/26/2023  8:35 PM Hicks Shark Add [R07.9] Chest pain     8/26/2023  8:35 PM Hicks Shark Add [M94.0] Costochondritis     8/26/2023  8:35 PM Hicks Shark Add [R45.851] Suicidal ideation     8/26/2023  8:35 PM Tia Oats J Add [R42] Vertigo       ED Disposition     None      Follow-up Information    None       Patient's Medications   Discharge Prescriptions    No medications on file            ED Provider  Electronically Signed by     Nolan Freeman DO  08/26/23 1931

## 2023-08-27 NOTE — ED NOTES
[place] PROMISe    Primary: Medicare A&B  ID: [id number]    Secondary: [facility]  Recipient ID: [id number]

## 2023-08-27 NOTE — CONSULTS
TeleConsultation - [address] [facility] 47 y.o. female MRN: [id number]  Unit/Bed#: ED 05 Encounter: [id number]        REQUIRED DOCUMENTATION:     1. This service was provided via Telemedicine. 2. Provider located at [facility].  3. TeleMed provider: Polina Marte MD.  4. Identify all parties in room with patient during tele consult: Patient   5. Patient was then informed that this was a Telemedicine visit and that the exam was being conducted confidentially over secure lines. My office door was closed. No one else was in the room. Patient acknowledged consent and understanding of privacy and security of the Telemedicine visit, and gave us permission to have the assistant stay in the room in order to assist with the history and to conduct the exam.  I informed the patient that I have reviewed their record in Epic and presented the opportunity for them to ask any questions regarding the visit today. The patient agreed to participate. Discussed with Maris Dai D.O     Assessment/Plan     Present on Admission:  **None**    Assessment:    Borderline Personality Disorder  Unspecified Mood Disorder      High utilizer behavior health plan reviewed, patient presents with chronic suicidal ideation that is similar to prior presentations that have not warranted inpatient psychiatric admission. Patient overall represents a chronic high risk of suicide but does not currently represent an acute or imminent risk of suicide warranting voluntary or involuntary inpatient psychiatric admission is safe for discharge home.     Suicide Risk Assessment: Acute: High, Non-Imminent  Chronic: High, Non-Imminent    Primary risk factors are Prior SAs, Mood Disorder, Impulsivity. However, patient has access to and desire for care. She is future-oriented and cites partner and staff as reasons for living.  Engaged in treatment.        Treatment Plan:    Planned Medication Changes:    -None     Current Medications:     Current Facility-Administered Medications   Medication Dose Route Frequency Provider Last Rate   • meclizine  25 mg Oral Once Dereck Sousa MD         Risks / Benefits of Treatment:    Risks, benefits, and possible side effects of medications explained to patient and patient verbalizes understanding. Other treatment modalities recommended as indicated:    · psychotherapy  · outpatient referral      Consults  Physician Requesting Consult: Pastora Cole DO  Principal Problem:<principal problem not specified>    Reason for Consult: Psych Evaluation       History of Present Illness      Patient reports that she has been feeling like strangling herself and that she hates her residential home. Patient states that she has had suicidal thoughts that she has felt in the last few weeks. Patient states that she feels her roommate is what keeps her alive. Patient states that she is never been happy in her life. Patient states that there are a few people are nice to her such as yvon. Patient states that she sees a psychiatrist every 4 weeks and that she has not had any medication changes. Patient states that she has not been IP in one year which she is proud of. Patient denied any current HI/AVH or other acute psychiatric complaints.       Psychiatric Review Of Systems:    sleep: no  appetite changes: no  weight changes: no  energy/anergy: no  interest/pleasure/anhedonia: no  somatic symptoms: no  anxiety/panic: no  otoniel: no  guilty/hopeless: no  self injurious behavior/risky behavior: no     Historical Information            Past Psychiatric History:      Psychiatric Hospitalizations:   • Multiple past inpatient psychiatric admissions  Outpatient Treatment History:   • current treatment with psychiatrist/Advanced Practitioner (Yes)  Suicide Attempts:   • Yes, multiple attempts by overdose  History of self-harm:   • Yes, history of self-abusive behavior  Violence History:   • no  Past Psychiatric medication trials: Multiple      Substance Abuse History: Denied           Family Psychiatric History: Denied           Social History:      Education: high school diploma/GED  Learning Disabilities: Yes  Marital history: single  Living arrangement, social support: Group Home. Occupational History: on permanent disability  Functioning Relationships: poor support system.   Other Pertinent History: None     Traumatic History:      Abuse: None  Other Traumatic Events: none      Past Medical History:   Diagnosis Date   • Anxiety    • Bipolar 1 disorder ([address])    • Bipolar affective disorder, depressed, severe ([address]) 10/10/2021   • Borderline personality disorder ([address])    • CAD (coronary artery disease)    • Cognitive impairment    • Depression    • Dyslipidemia    • GERD (gastroesophageal reflux disease)    • Hypertension    • Obesity    • Psychiatric disorder    • Psychiatric illness    • PTSD (post-traumatic stress disorder)    • Schizoaffective disorder ([address])    • Seizure ([address])        Medical Review Of Systems:    Review of Systems    Meds/Allergies     all current active meds have been reviewed  Allergies   Allergen Reactions   • Fish Oil - Food Allergy Other (See Comments)     unknown   • Fish-Derived Products - Food Allergy Hives       Objective     Vital signs in last 24 hours:  Temp:  [98.7 °F (37.1 °C)] 98.7 °F (37.1 °C)  HR:  [80-82] 80  Resp:  [14-20] 20  BP: (141-145)/(81-83) 142/83    No intake or output data in the 24 hours ending 08/26/23 2204    Mental Status Evaluation:     Appearance:  age appropriate   Behavior:  normal   Speech:  normal pitch and normal volume   Mood:  "Depressed, over it as always"   Affect:  constricted   Language: naming objects   Thought Process:  normal   Associations intact associations   Thought Content:  normal   Perceptual Disturbances: None   Risk Potential: Suicidal Ideations without plan  Homicidal Ideations none  Potential for Aggression No   Sensorium:  person, place and time/date Cognition:  recent and remote memory grossly intact   Consciousness:  alert    Attention: attention span and concentration were age appropriate   Intellect: within normal limits   Fund of Knowledge: awareness of current events: President   Insight:  limited   Judgment: limited   Muscle Strength:  Muscle Tone: normal NFT  normal   Gait/Station: normal gait/station   Motor Activity: no abnormal movements            Lab Results: I have personally reviewed all pertinent laboratory/tests results. Most Recent Labs:   Lab Results   Component Value Date    WBC 8.38 08/26/2023    RBC 3.81 08/26/2023    HGB 11.7 08/26/2023    HCT 35.2 08/26/2023     08/26/2023    RDW 13.9 08/26/2023    NEUTROABS 5.01 08/26/2023    SODIUM 134 (L) 08/26/2023    K 3.5 08/26/2023    CL 98 08/26/2023    CO2 29 08/26/2023    BUN 10 08/26/2023    CREATININE 0.48 (L) 08/26/2023    GLUC 87 08/26/2023    GLUF 130 (H) 12/13/2022    CALCIUM 9.5 08/26/2023    AST 7 (L) 08/26/2023    ALT 7 08/26/2023    ALKPHOS 50 08/26/2023    TP 6.9 08/26/2023    ALB 4.2 08/26/2023    TBILI 0.49 08/26/2023    CHOLESTEROL 177 12/13/2022    HDL 40 (L) 12/13/2022    TRIG 244 (H) 12/13/2022    LDLCALC 88 12/13/2022    NONHDLC 137 12/13/2022    VALPROICTOT 13 (L) 08/10/2023    CUC4KBXVNBWD 2.762 08/06/2023    PREGSERUM Negative 04/28/2022    RPR Non-Reactive 12/13/2022    HGBA1C 4.5 08/08/2023    EAG 82 08/08/2023       Imaging Studies: XR chest 1 view portable    Result Date: 8/11/2023  Narrative: CHEST INDICATION:   chest pain. COMPARISON: Chest CT 4/2/2023. EXAM PERFORMED/VIEWS:  XR CHEST PORTABLE. FINDINGS: Cardiomediastinal silhouette normal. Low lung volumes producing vascular crowding. No definite acute disease. No effusion or pneumothorax. Upper abdomen normal. Mild curvature of the spine. Impression: Low lung volumes with vascular crowding. No definite acute disease.  Workstation performed: [id number]     EKG/Pathology/Other Studies:   Lab Results Component Value Date    VENTRATE 81 08/26/2023    ATRIALRATE 81 08/26/2023    PRINT 218 08/26/2023    QRSDINT 84 08/26/2023    QTINT 378 08/26/2023    QTCINT 439 08/26/2023    PAXIS 63 08/26/2023    QRSAXIS 51 08/26/2023    TWAVEAXIS 52 08/26/2023        Code Status: Prior  Advance Directive and Living Will:      Power of :    POLST:      Screenings:    1. Nutrition Screening  Nutrition Assessment (completed by Staff):      2. Pain Screening  Pain Screening: Pain Assessment  Pain Assessment Tool: 0-10  Pain Score: 6  Pain Location/Orientation: Location: Chest  Pain Onset/Description: Descriptor: Stabbing    3. Suicide Screening  ED Crisis Suicide Risk Assessment:               C-SSRS    1) Have you wished you were dead or wished you could go to sleep and not wake up? YES   2) Have you actually had any thoughts about killing yourself? YES   If YES to 2, ask questions 3, 4, 5 and 6. If NO to 2, skip to question 6.    3) Have you been thinking about how you might do this? YES   4) Have you had these thoughts and had some intention of acting on them? High Risk YES   5) Have you started to work out or worked out the details of how to kill yourself? Did you intend to carry out this plan? High Risk YES   Always Ask Question 6  Life-time  Past 3 Months    6) Have you done anything, started to do anything, or prepared to do anything to end your life? Examples: Took pills, tried to shoot yourself, cut yourself, tried to hang yourself, took out pills but didn't swallow any, held a gun but changed your mind or it was grabbed from your hand, went to the roof but didn't jump, collected pills, obtained a gun, gave away valuables, wrote a will or suicide note, etc.   If yes, was this within the past 3 months?   High Risk NO             SAFE-T Suicide Risk Assessment    Risk Factors: Prior SA, Mood Disorder, Limited Social Supports     Examples: prior psychiatric history, prior suicide attempts or self-directed violence, current psychiatric disorder, substance abuse, anhedonia, impulsivity, aggression, hopelessness, anxiety, insomnia, psychosis, family history of suicide attempts, child maltreatment, ongoing medical illness, intoxication, family distress, history of trauma or abuse, social isolation, loss of primary relationships, culture, or sense of community      Access to lethal means: Denied          Protective Factors:  Reasons for Living     Examples: family and community support, feelings of connectedness, support from ongoing medical care relationship, outpatient mental health support, skills in problem solving, skills in conflict resolution, cultural/Hoahaoism beliefs that discourage suicide, responsibility to children or animals, fear of death or dying, identifies reasons for living, engaged in work or school         Suicide Inquiry:     Ideation: Yes, current     (frequency, intensity, duration - recent, worst ever)    Plan: Hanging      (timing, location, lethality, availability, preparation)    Behavior: Yes, prior Attempts     (past attempts, aborted attempts, rehearsals)    Intent: Denied    (extent to which patient believes plan is lethal, expects to carry out plan /wishes to die, regrets survival)    Or    See suicide screen         Suicide Risk Level: High     (High/Moderate/Low)         Counseling / Coordination of Care: Total floor / unit time spent today 30 minutes. Greater than 50% of total time was spent with the patient and / or family counseling and / or coordination of care. A description of the counseling / coordination of care: Direct Patient Care, Chart Review, and Documentation.

## 2023-08-27 NOTE — ED PROVIDER NOTES
History  Chief Complaint   Patient presents with   • Chest Pain     Pt states that she has been having chest pain in the middle of chest with dizziness since yesterday. Pt states that t is room spinning dizzy. Pt states that the pain is sharp and in the middle of chest      47year old female presents for evaluation of sharp mid chest pain since yesterday as well as dizziness described as a room spinning sensation which worsens with head movement and opening her eyes. Patient states that her symptoms began yesterday. Prior to Admission Medications   Prescriptions Last Dose Informant Patient Reported? Taking? Cyanocobalamin (Vitamin B 12) 500 MCG TABS   No No   Sig: Take 1,000 mcg by mouth in the morning   FLUoxetine (PROzac) 40 MG capsule  Self No No   Sig: Take 1 capsule (40 mg total) by mouth daily Do not start before 2022. aspirin 81 mg chewable tablet   No No   Sig: Chew 1 tablet (81 mg total) daily   atorvastatin (LIPITOR) 40 mg tablet   No No   Sig: Take 1 tablet (40 mg total) by mouth daily with dinner   benztropine (COGENTIN) 1 mg tablet   Yes No   Sig: Take 1 mg by mouth 2 (two) times a day   cholecalciferol (VITAMIN D3) 400 units tablet   No No   Sig: Take 1 tablet (400 Units total) by mouth daily   divalproex sodium (DEPAKOTE ER) 500 mg 24 hr tablet   No No   Sig: Take 4 tablets (2,000 mg total) by mouth daily at bedtime   fluticasone (FLONASE) 50 mcg/act nasal spray   No No   Si spray into each nostril daily Do not start before 2022.    folic acid (FOLVITE) 1 mg tablet   No No   Sig: Take 1 tablet (1 mg total) by mouth daily   haloperidol (HALDOL) 10 mg tablet   No No   Sig: Take 1 tablet (10 mg total) by mouth 2 (two) times a day   lisinopril (ZESTRIL) 20 mg tablet  Self No No   Sig: Take 1 tablet (20 mg total) by mouth daily   mirtazapine (REMERON) 45 MG tablet   No No   Sig: Take 1 tablet (45 mg total) by mouth daily at bedtime   ondansetron (Zofran ODT) 4 mg disintegrating tablet   No No   Sig: Take 1 tablet (4 mg total) by mouth every 6 (six) hours as needed for nausea or vomiting   Patient not taking: Reported on 2/26/2023   pantoprazole (PROTONIX) 20 mg tablet   No No   Sig: Take 1 tablet (20 mg total) by mouth daily   prazosin (MINIPRESS) 2 mg capsule   No No   Sig: Take 1 capsule (2 mg total) by mouth daily at bedtime   senna-docusate sodium (SENOKOT S) 8.6-50 mg per tablet   No No   Sig: Take 1 tablet by mouth 2 (two) times a day   traZODone (DESYREL) 100 mg tablet   No No   Sig: Take 1 tablet (100 mg total) by mouth daily at bedtime   ziprasidone (GEODON) 40 mg capsule   No No   Sig: Take 1 capsule (40 mg total) by mouth 2 (two) times a day with meals      Facility-Administered Medications: None       Past Medical History:   Diagnosis Date   • Anxiety    • Bipolar 1 disorder (HCC)    • Bipolar affective disorder, depressed, severe ([address]) 10/10/2021   • Borderline personality disorder ([address])    • CAD (coronary artery disease)    • Cognitive impairment    • Depression    • Dyslipidemia    • GERD (gastroesophageal reflux disease)    • Hypertension    • Obesity    • Psychiatric disorder    • Psychiatric illness    • PTSD (post-traumatic stress disorder)    • Schizoaffective disorder ([address])    • Seizure ([address])        Past Surgical History:   Procedure Laterality Date   • APPENDECTOMY      PATIENT DENIES HAVING APPENDIX REMOVED   • CHOLECYSTECTOMY     • FOOT SURGERY Right        Family History   Problem Relation Age of Onset   • Kidney cancer Mother    • Cerebral aneurysm Father      I have reviewed and agree with the history as documented.     E-Cigarette/Vaping   • E-Cigarette Use Never User      E-Cigarette/Vaping Substances   • Nicotine No    • THC No    • CBD No    • Flavoring No    • Other No    • Unknown No      Social History     Tobacco Use   • Smoking status: Former   • Smokeless tobacco: Never   • Tobacco comments:     Pt stated "smokes when stressed"   Vaping Use   • Vaping Use: Never used   Substance Use Topics   • Alcohol use: Yes     Comment: occasionally   • Drug use: Not Currently       Review of Systems    Physical Exam  Physical Exam  Vitals and nursing note reviewed. HENT:      Head: Normocephalic and atraumatic. Mouth/Throat:      Mouth: Mucous membranes are moist.   Cardiovascular:      Rate and Rhythm: Normal rate and regular rhythm. Pulses: Normal pulses. Heart sounds: Normal heart sounds. Pulmonary:      Effort: Pulmonary effort is normal. No respiratory distress. Breath sounds: Normal breath sounds. Chest:       Abdominal:      General: There is no distension. Palpations: Abdomen is soft. Tenderness: There is no abdominal tenderness. Skin:     General: Skin is warm and dry. Neurological:      Mental Status: She is alert.          Vital Signs  ED Triage Vitals [08/26/23 2010]   Temperature Pulse Respirations Blood Pressure SpO2   98.7 °F (37.1 °C) 82 20 143/83 100 %      Temp Source Heart Rate Source Patient Position - Orthostatic VS BP Location FiO2 (%)   Oral Monitor Lying Right arm --      Pain Score       10 - Worst Possible Pain           Vitals:    08/26/23 2130 08/26/23 2200 08/26/23 2300 08/27/23 0000   BP: 142/83 135/75 141/80 146/74   Pulse: 80 79 69 72   Patient Position - Orthostatic VS: Lying Lying Lying Lying         Visual Acuity      ED Medications  Medications   ketorolac (TORADOL) injection 15 mg (15 mg Intravenous Given 8/26/23 2112)   meclizine (ANTIVERT) tablet 25 mg (25 mg Oral Given 8/26/23 2250)   ondansetron (FOR EMS ONLY) (ZOFRAN) 4 mg/2 mL injection 4 mg (0 mg Does not apply Given to EMS 8/26/23 2111)   diazepam (VALIUM) tablet 5 mg (5 mg Oral Given 8/26/23 2250)       Diagnostic Studies  Results Reviewed     Procedure Component Value Units Date/Time    Rapid drug screen, urine [[id number]] Collected: 08/26/23 2218    Lab Status: Final result Specimen: Urine, Catheter Updated: 08/26/23 2259 Amph/Meth UR Negative     Barbiturate Ur Negative     Benzodiazepine Urine Negative     Cocaine Urine Negative     Methadone Urine --     Opiate Urine Negative     PCP Ur Negative     THC Urine Negative     Oxycodone Urine Negative    Narrative:      No methadone test performed; if required call laboratory. FOR MEDICAL PURPOSES ONLY. IF CONFIRMATION NEEDED PLEASE CONTACT THE LAB WITHIN 5 DAYS. Drug Screen Cutoff Levels:  AMPHETAMINE/METHAMPHETAMINES  1000 ng/mL  BARBITURATES     200 ng/mL  BENZODIAZEPINES     200 ng/mL  COCAINE      300 ng/mL  METHADONE      300 ng/mL  OPIATES      300 ng/mL  PHENCYCLIDINE     25 ng/mL  THC       50 ng/mL  OXYCODONE      100 ng/mL    Urine Microscopic [[id number]]  (Normal) Collected: 08/26/23 2218    Lab Status: Final result Specimen: Urine, Clean Catch Updated: 08/26/23 2257     RBC, UA None Seen /hpf      WBC, UA 2-4 /hpf      Epithelial Cells None Seen /hpf      Bacteria, UA None Seen /hpf     UA (URINE) with reflex to Scope [[id number]]  (Abnormal) Collected: 08/26/23 2218    Lab Status: Final result Specimen: Urine, Clean Catch Updated: 08/26/23 2244     Color, UA Yellow     Clarity, UA Clear     Specific Gravity, UA 1.015     pH, UA 5.0     Leukocytes, UA Moderate     Nitrite, UA Negative     Protein, UA Negative mg/dl      Glucose, UA Negative mg/dl      Ketones, UA Negative mg/dl      Urobilinogen, UA <2.0 mg/dl      Bilirubin, UA Negative     Occult Blood, UA Negative    FLU/RSV/COVID - if FLU/RSV clinically relevant [[id number]]  (Normal) Collected: 08/26/23 2122    Lab Status: Final result Specimen: Nares from Nose Updated: 08/26/23 2227     SARS-CoV-2 Negative     INFLUENZA A PCR Negative     INFLUENZA B PCR Negative     RSV PCR Negative    Narrative:      FOR PEDIATRIC PATIENTS - copy/paste COVID Guidelines URL to browser: [email or web address]    SARS-CoV-2 assay is a Nucleic Acid Amplification assay intended for the  qualitative detection of nucleic acid from SARS-CoV-2 in nasopharyngeal  swabs. Results are for the presumptive identification of SARS-CoV-2 RNA. Positive results are indicative of infection with SARS-CoV-2, the virus  causing COVID-19, but do not rule out bacterial infection or co-infection  with other viruses. Laboratories within the [facility] and its  territories are required to report all positive results to the appropriate  public health authorities. Negative results do not preclude SARS-CoV-2  infection and should not be used as the sole basis for treatment or other  patient management decisions. Negative results must be combined with  clinical observations, patient history, and epidemiological information. This test has not been FDA cleared or approved. This test has been authorized by FDA under an Emergency Use Authorization  (EUA). This test is only authorized for the duration of time the  declaration that circumstances exist justifying the authorization of the  emergency use of an in vitro diagnostic tests for detection of SARS-CoV-2  virus and/or diagnosis of COVID-19 infection under section 564(b)(1) of  the Act, 21 U. S.C. 147KZQ-8(E)(2), unless the authorization is terminated  or revoked sooner. The test has been validated but independent review by FDA  and CLIA is pending. Test performed using [employer] GeneXpert: This RT-PCR assay targets N2,  a region unique to SARS-CoV-2. A conserved region in the E-gene was chosen  for pan-Sarbecovirus detection which includes SARS-CoV-2. According to CMS-2020-01-R, this platform meets the definition of high-throughput technology.     TSH, 3rd generation with Free T4 reflex [[id number]]  (Normal) Collected: 08/26/23 2122    Lab Status: Final result Specimen: Blood from Arm, Right Updated: 08/26/23 2224     TSH 3RD GENERATON 3.285 uIU/mL     POCT alcohol breath test [[id number]]  (Normal) Resulted: 08/26/23 2138    Lab Status: Final result Updated: 08/26/23 2138     EXTBreath Alcohol 0.000    Comprehensive metabolic panel [[id number]]  (Abnormal) Collected: 08/26/23 2020    Lab Status: Final result Specimen: Blood from Arm, Right Updated: 08/26/23 2103     Sodium 134 mmol/L      Potassium 3.5 mmol/L      Chloride 98 mmol/L      CO2 29 mmol/L      ANION GAP 7 mmol/L      BUN 10 mg/dL      Creatinine 0.48 mg/dL      Glucose 87 mg/dL      Calcium 9.5 mg/dL      AST 7 U/L      ALT 7 U/L      Alkaline Phosphatase 50 U/L      Total Protein 6.9 g/dL      Albumin 4.2 g/dL      Total Bilirubin 0.49 mg/dL      eGFR 113 ml/min/1.73sq m     Narrative:      [place] guidelines for Chronic Kidney Disease (CKD):   •  Stage 1 with normal or high GFR (GFR > 90 mL/min/1.73 square meters)  •  Stage 2 Mild CKD (GFR = 60-89 mL/min/1.73 square meters)  •  Stage 3A Moderate CKD (GFR = 45-59 mL/min/1.73 square meters)  •  Stage 3B Moderate CKD (GFR = 30-44 mL/min/1.73 square meters)  •  Stage 4 Severe CKD (GFR = 15-29 mL/min/1.73 square meters)  •  Stage 5 End Stage CKD (GFR <15 mL/min/1.73 square meters)  Note: GFR calculation is accurate only with a steady state creatinine    HS Troponin 0hr (reflex protocol) [[id number]]  (Normal) Collected: 08/26/23 2020    Lab Status: Final result Specimen: Blood from Arm, Right Updated: 08/26/23 2056     hs TnI 0hr <2 ng/L     CBC and differential [[id number]] Collected: 08/26/23 2020    Lab Status: Final result Specimen: Blood from Arm, Right Updated: 08/26/23 2033     WBC 8.38 Thousand/uL      RBC 3.81 Million/uL      Hemoglobin 11.7 g/dL      Hematocrit 35.2 %      MCV 92 fL      MCH 30.7 pg      MCHC 33.2 g/dL      RDW 13.9 %      MPV 9.4 fL      Platelets 231 Thousands/uL      nRBC 0 /100 WBCs      Neutrophils Relative 60 %      Immat GRANS % 1 %      Lymphocytes Relative 26 %      Monocytes Relative 10 %      Eosinophils Relative 3 %      Basophils Relative 0 %      Neutrophils Absolute 5.01 Thousands/µL Immature Grans Absolute 0.07 Thousand/uL      Lymphocytes Absolute 2.17 Thousands/µL      Monocytes Absolute 0.86 Thousand/µL      Eosinophils Absolute 0.25 Thousand/µL      Basophils Absolute 0.02 Thousands/µL                  XR chest 1 view portable   ED Interpretation by Akil Chávez MD (08/26 2037)   No acute pulmonary pathology      Final Result by Lorena Carnes MD (08/27 7987)      No acute cardiopulmonary disease. Workstation performed: [id number]                    Procedures  ECG 12 Lead Documentation Only    Date/Time: 8/26/2023 8:18 PM    Performed by: Akil Chávez MD  Authorized by: Akil Chávez MD    Indications / Diagnosis:  Chest pain  ECG reviewed by me, the ED Provider: yes    Patient location:  ED  Previous ECG:     Previous ECG:  Compared to current    Comparison ECG info:  8/10/23 normal ekg    Similarity:  Changes noted  Interpretation:     Interpretation: abnormal    Rate:     ECG rate:  81    ECG rate assessment: normal    Rhythm:     Rhythm: sinus rhythm    Ectopy:     Ectopy: none    QRS:     QRS axis:  Normal    QRS intervals:  Normal  Conduction:     Conduction: abnormal      Abnormal conduction: 1st degree    ST segments:     ST segments:  Normal  T waves:     T waves: flattening and inverted      Flattening:  AVL    Inverted:  V2             ED Course  ED Course as of 08/27/23 1108   Sat Aug 26, 2023   2033 Patient reports she is now feeling suicidal.  Crisis consult placed.    2045 Psychiatry consult placed             HEART Risk Score    Flowsheet Row Most Recent Value   Heart Score Risk Calculator    History 0 Filed at: 08/26/2023 2031   ECG 1 Filed at: 08/26/2023 2031   Age 1 Filed at: 08/26/2023 2031   Risk Factors 2 Filed at: 08/26/2023 2031   Troponin 0 Filed at: 08/26/2023 2031   HEART Score 4 Filed at: 08/26/2023 2031                        SBIRT 22yo+    Flowsheet Row Most Recent Value   Initial Alcohol Screen: US AUDIT-C     1. How often do you have a drink containing alcohol? 0 Filed at: 08/26/2023 2031   2. How many drinks containing alcohol do you have on a typical day you are drinking? 0 Filed at: 08/26/2023 2031   3a. Male UNDER 65: How often do you have five or more drinks on one occasion? 0 Filed at: 08/26/2023 2031   3b. FEMALE Any Age, or MALE 65+: How often do you have 4 or more drinks on one occassion? 0 Filed at: 08/26/2023 2031   Audit-C Score 0 Filed at: 08/26/2023 2031   DAT: How many times in the past year have you. .. Used an illegal drug or used a prescription medication for non-medical reasons? Never Filed at: 08/26/2023 2031                    Medical Decision Making  47year old female presents for evaluation of chest pain and dizziness. Dizziness consistent with peripheral vertigo. Meclizine ordered. Chest pain with chest wall tenderness over costochondral cartilage suspicious for costochondritis. Toradol given for pain. EKG nonspecific without acute ischemic changes or arrhythmia on my independent interpretation. HEART score 4 with history of CAD. Ambulatory referral to cardiology placed. While in the ED, patient reported suicidal ideation. Crisis and psychiatry consulted. Patient signed out to Dr. Marleni De Paz. Amount and/or Complexity of Data Reviewed  Labs: ordered. Radiology: ordered and independent interpretation performed. Risk  Prescription drug management.           Disposition  Final diagnoses:   Chest pain   Costochondritis   Suicidal ideation   Vertigo     Time reflects when diagnosis was documented in both MDM as applicable and the Disposition within this note     Time User Action Codes Description Comment    8/26/2023  8:35 PM Sharona Fortune Add [R07.9] Chest pain     8/26/2023  8:35 PM Sharona Fortune Add [M94.0] Costochondritis     8/26/2023  8:35 PM Sharona Fortune Add [R45.851] Suicidal ideation     8/26/2023  8:35 PM Mansi Pappas Add [R42] Vertigo ED Disposition     ED Disposition   Discharge    Condition   Stable    Date/Time   Sat Aug 26, 2023 10:33 PM    Comment   Hermilogabe Suarez discharge to home/self care.                Follow-up Information     Follow up With Specialties Details Why Contact Info Additional Information    Bj Hernandez MD Internal Medicine In 2 days  [address] [place] [address]   [phone]        [address] [facility] Emergency Department Emergency Medicine  As needed, If symptoms worsen [address]  [phone] [address] [place] [address] Emergency Department, [facility], [facility], [address]          Discharge Medication List as of 8/26/2023 10:34 PM      CONTINUE these medications which have NOT CHANGED    Details   aspirin 81 mg chewable tablet Chew 1 tablet (81 mg total) daily, Starting Mon 12/19/2022, Until Wed 1/18/2023, Normal      atorvastatin (LIPITOR) 40 mg tablet Take 1 tablet (40 mg total) by mouth daily with dinner, Starting Mon 12/19/2022, Until Wed 1/18/2023, Normal      benztropine (COGENTIN) 1 mg tablet Take 1 mg by mouth 2 (two) times a day, Historical Med      cholecalciferol (VITAMIN D3) 400 units tablet Take 1 tablet (400 Units total) by mouth daily, Starting Mon 12/19/2022, Until Wed 1/18/2023, Normal      Cyanocobalamin (Vitamin B 12) 500 MCG TABS Take 1,000 mcg by mouth in the morning, Starting Mon 12/19/2022, Normal      divalproex sodium (DEPAKOTE ER) 500 mg 24 hr tablet Take 4 tablets (2,000 mg total) by mouth daily at bedtime, Starting Mon 12/19/2022, Until Wed 1/18/2023, Normal      FLUoxetine (PROzac) 40 MG capsule Take 1 capsule (40 mg total) by mouth daily Do not start before December 20, 2022., Starting Tue 12/20/2022, Until Thu 1/19/2023, Normal      fluticasone (FLONASE) 50 mcg/act nasal spray 1 spray into each nostril daily Do not start before December 20, 2022., Starting Tue 26/31/9001, Normal      folic acid (FOLVITE) 1 mg tablet Take 1 tablet (1 mg total) by mouth daily, Starting Mon 12/19/2022, Until Wed 1/18/2023, Normal      haloperidol (HALDOL) 10 mg tablet Take 1 tablet (10 mg total) by mouth 2 (two) times a day, Starting Mon 12/19/2022, Until Wed 1/18/2023, Normal      lisinopril (ZESTRIL) 20 mg tablet Take 1 tablet (20 mg total) by mouth daily, Starting Mon 12/19/2022, Until Wed 1/18/2023, Normal      mirtazapine (REMERON) 45 MG tablet Take 1 tablet (45 mg total) by mouth daily at bedtime, Starting Mon 12/19/2022, Until Wed 1/18/2023, Normal      ondansetron (Zofran ODT) 4 mg disintegrating tablet Take 1 tablet (4 mg total) by mouth every 6 (six) hours as needed for nausea or vomiting, Starting Thu 1/26/2023, Normal      pantoprazole (PROTONIX) 20 mg tablet Take 1 tablet (20 mg total) by mouth daily, Starting u 1/26/2023, Normal      prazosin (MINIPRESS) 2 mg capsule Take 1 capsule (2 mg total) by mouth daily at bedtime, Starting Mon 12/19/2022, Until Wed 1/18/2023, Normal      senna-docusate sodium (SENOKOT S) 8.6-50 mg per tablet Take 1 tablet by mouth 2 (two) times a day, Starting Mon 12/19/2022, Until Wed 1/18/2023, Normal      traZODone (DESYREL) 100 mg tablet Take 1 tablet (100 mg total) by mouth daily at bedtime, Starting Mon 12/19/2022, Until Wed 1/18/2023, Normal      ziprasidone (GEODON) 40 mg capsule Take 1 capsule (40 mg total) by mouth 2 (two) times a day with meals, Starting Mon 12/19/2022, Until Wed 1/18/2023, Normal                 PDMP Review       Value Time User    PDMP Reviewed  Yes 5/6/2022 10:38 AM Mitzi Norwood MD          ED Provider  Electronically Signed by           Pascual Santiago MD  08/27/23 8318

## 2023-10-17 NOTE — BH TRANSITION RECORD
Contact Information: If you have any questions, concerns, pended studies, tests and/or procedures, or emergencies regarding your inpatient behavioral health visit  Please contact [facility] [phone] and ask to speak to a , nurse or physician  A contact is available 24 hours/ 7 days a week at this number  Summary of Procedures Performed During your Stay:  Below is a list of major procedures performed during your hospital stay and a summary of results:  - Cardiac Procedures/Studies: ekg  Pending Studies (From admission, onward)    None        If studies are pending at discharge, follow up with your PCP and/or referring provider  [id number] OBS or IP - low complexity OR 25-34 mins [id number] OBS or IP - moderate complexity OR 35-49 mins

## 2023-10-23 ENCOUNTER — HOSPITAL ENCOUNTER (EMERGENCY)
Facility: HOSPITAL | Age: 53
Discharge: HOME | End: 2023-10-23
Attending: EMERGENCY MEDICINE
Payer: MEDICARE

## 2023-10-23 VITALS
HEART RATE: 81 BPM | DIASTOLIC BLOOD PRESSURE: 65 MMHG | TEMPERATURE: 98.7 F | RESPIRATION RATE: 18 BRPM | HEIGHT: 66 IN | WEIGHT: 210 LBS | OXYGEN SATURATION: 95 % | SYSTOLIC BLOOD PRESSURE: 118 MMHG | BODY MASS INDEX: 33.75 KG/M2

## 2023-10-23 DIAGNOSIS — N39.0 UTI (URINARY TRACT INFECTION): Primary | ICD-10-CM

## 2023-10-23 LAB
ALBUMIN SERPL BCP-MCNC: 4.2 G/DL (ref 3.5–5)
ALP SERPL-CCNC: 59 U/L (ref 34–104)
ALT SERPL W P-5'-P-CCNC: 10 U/L (ref 7–52)
ANION GAP SERPL CALCULATED.3IONS-SCNC: 4 MMOL/L
AST SERPL W P-5'-P-CCNC: 9 U/L (ref 13–39)
BACTERIA UR QL AUTO: ABNORMAL /HPF
BASOPHILS # BLD AUTO: 0.03 THOUSANDS/ÂΜL (ref 0–0.1)
BASOPHILS NFR BLD AUTO: 0 % (ref 0–1)
BILIRUB SERPL-MCNC: 0.41 MG/DL (ref 0.2–1)
BILIRUB UR QL STRIP: NEGATIVE
BUN SERPL-MCNC: 14 MG/DL (ref 5–25)
CALCIUM SERPL-MCNC: 9.3 MG/DL (ref 8.4–10.2)
CARDIAC TROPONIN I PNL SERPL HS: <2 NG/L
CHLORIDE SERPL-SCNC: 100 MMOL/L (ref 96–108)
CLARITY UR: CLEAR
CO2 SERPL-SCNC: 32 MMOL/L (ref 21–32)
COLOR UR: YELLOW
CREAT SERPL-MCNC: 0.62 MG/DL (ref 0.6–1.3)
EOSINOPHIL # BLD AUTO: 0.14 THOUSAND/ÂΜL (ref 0–0.61)
EOSINOPHIL NFR BLD AUTO: 2 % (ref 0–6)
ERYTHROCYTE [DISTWIDTH] IN BLOOD BY AUTOMATED COUNT: 14.3 % (ref 11.6–15.1)
GFR SERPL CREATININE-BSD FRML MDRD: 104 ML/MIN/1.73SQ M
GLUCOSE SERPL-MCNC: 99 MG/DL (ref 65–140)
GLUCOSE UR STRIP-MCNC: NEGATIVE MG/DL
HCT VFR BLD AUTO: 34.9 % (ref 34.8–46.1)
HGB BLD-MCNC: 11.5 G/DL (ref 11.5–15.4)
HGB UR QL STRIP.AUTO: NEGATIVE
IMM GRANULOCYTES # BLD AUTO: 0.15 THOUSAND/UL (ref 0–0.2)
IMM GRANULOCYTES NFR BLD AUTO: 2 % (ref 0–2)
KETONES UR STRIP-MCNC: NEGATIVE MG/DL
LEUKOCYTE ESTERASE UR QL STRIP: ABNORMAL
LIPASE SERPL-CCNC: 12 U/L (ref 11–82)
LYMPHOCYTES # BLD AUTO: 1.96 THOUSANDS/ÂΜL (ref 0.6–4.47)
LYMPHOCYTES NFR BLD AUTO: 25 % (ref 14–44)
MCH RBC QN AUTO: 30.7 PG (ref 26.8–34.3)
MCHC RBC AUTO-ENTMCNC: 33 G/DL (ref 31.4–37.4)
MCV RBC AUTO: 93 FL (ref 82–98)
MONOCYTES # BLD AUTO: 0.81 THOUSAND/ÂΜL (ref 0.17–1.22)
MONOCYTES NFR BLD AUTO: 10 % (ref 4–12)
NEUTROPHILS # BLD AUTO: 4.77 THOUSANDS/ÂΜL (ref 1.85–7.62)
NEUTS SEG NFR BLD AUTO: 61 % (ref 43–75)
NITRITE UR QL STRIP: NEGATIVE
NON-SQ EPI CELLS URNS QL MICRO: ABNORMAL /HPF
NRBC BLD AUTO-RTO: 0 /100 WBCS
PH UR STRIP.AUTO: 5.5 [PH]
PLATELET # BLD AUTO: 148 THOUSANDS/UL (ref 149–390)
PMV BLD AUTO: 9.6 FL (ref 8.9–12.7)
POTASSIUM SERPL-SCNC: 4.2 MMOL/L (ref 3.5–5.3)
PROT SERPL-MCNC: 7.1 G/DL (ref 6.4–8.4)
PROT UR STRIP-MCNC: NEGATIVE MG/DL
RBC # BLD AUTO: 3.75 MILLION/UL (ref 3.81–5.12)
RBC #/AREA URNS AUTO: ABNORMAL /HPF
SODIUM SERPL-SCNC: 136 MMOL/L (ref 135–147)
SP GR UR STRIP.AUTO: 1.02 (ref 1–1.03)
UROBILINOGEN UR STRIP-ACNC: <2 MG/DL
WBC # BLD AUTO: 7.86 THOUSAND/UL (ref 4.31–10.16)
WBC #/AREA URNS AUTO: ABNORMAL /HPF

## 2023-10-23 PROCEDURE — 85025 COMPLETE CBC W/AUTO DIFF WBC: CPT | Performed by: EMERGENCY MEDICINE

## 2023-10-23 PROCEDURE — 81001 URINALYSIS AUTO W/SCOPE: CPT | Performed by: EMERGENCY MEDICINE

## 2023-10-23 PROCEDURE — 93005 ELECTROCARDIOGRAM TRACING: CPT

## 2023-10-23 PROCEDURE — 99284 EMERGENCY DEPT VISIT MOD MDM: CPT

## 2023-10-23 PROCEDURE — 83690 ASSAY OF LIPASE: CPT | Performed by: EMERGENCY MEDICINE

## 2023-10-23 PROCEDURE — 36415 COLL VENOUS BLD VENIPUNCTURE: CPT

## 2023-10-23 PROCEDURE — 80053 COMPREHEN METABOLIC PANEL: CPT | Performed by: EMERGENCY MEDICINE

## 2023-10-23 PROCEDURE — 84484 ASSAY OF TROPONIN QUANT: CPT | Performed by: EMERGENCY MEDICINE

## 2023-10-23 RX ORDER — CEPHALEXIN 250 MG/1
500 CAPSULE ORAL ONCE
Status: COMPLETED | OUTPATIENT
Start: 2023-10-23 | End: 2023-10-23

## 2023-10-23 RX ORDER — CEPHALEXIN 500 MG/1
500 CAPSULE ORAL EVERY 6 HOURS SCHEDULED
Qty: 28 CAPSULE | Refills: 0 | Status: SHIPPED | OUTPATIENT
Start: 2023-10-23 | End: 2023-10-30

## 2023-10-23 RX ADMIN — CEPHALEXIN 500 MG: 250 CAPSULE ORAL at 19:23

## 2023-10-23 NOTE — ED PROVIDER NOTES
History  Chief Complaint   Patient presents with    Possible UTI     Patient presents to the ED with c/o lower pelvic pain and possible UTI, states her urine is "dark" in color. These complaints associated with dizziness began last evening      This is a 48 y/o female with PMH PMH anxiety, bipolar disorder, CAD, HTN, HLD, PTSD who presents to the ER for lower abdominal pain worsening with urination as well as burning with urination and increased urinary urgency x 2 days. Patient states it feels like a UTI because she gets them often. She denies any flank pain, blood in her urine, fevers, chills, chest pain, shortness of breath. She admits to some lightheadedness briefly last night but says that has since resolved. No head or room spinning sensations. Denies any numbness, tingling, decreased sensation, difficulty walking, difficulty speaking, facial droop. History provided by:  Patient   used: No        Prior to Admission Medications   Prescriptions Last Dose Informant Patient Reported? Taking? Cyanocobalamin (Vitamin B 12) 500 MCG TABS   No No   Sig: Take 1,000 mcg by mouth in the morning   FLUoxetine (PROzac) 40 MG capsule  Self No No   Sig: Take 1 capsule (40 mg total) by mouth daily Do not start before 2022. aspirin 81 mg chewable tablet   No No   Sig: Chew 1 tablet (81 mg total) daily   atorvastatin (LIPITOR) 40 mg tablet   No No   Sig: Take 1 tablet (40 mg total) by mouth daily with dinner   benztropine (COGENTIN) 1 mg tablet   Yes No   Sig: Take 1 mg by mouth 2 (two) times a day   cholecalciferol (VITAMIN D3) 400 units tablet   No No   Sig: Take 1 tablet (400 Units total) by mouth daily   divalproex sodium (DEPAKOTE ER) 500 mg 24 hr tablet   No No   Sig: Take 4 tablets (2,000 mg total) by mouth daily at bedtime   fluticasone (FLONASE) 50 mcg/act nasal spray   No No   Si spray into each nostril daily Do not start before 2022.    folic acid (FOLVITE) 1 mg tablet   No No   Sig: Take 1 tablet (1 mg total) by mouth daily   haloperidol (HALDOL) 10 mg tablet   No No   Sig: Take 1 tablet (10 mg total) by mouth 2 (two) times a day   lisinopril (ZESTRIL) 20 mg tablet  Self No No   Sig: Take 1 tablet (20 mg total) by mouth daily   mirtazapine (REMERON) 45 MG tablet   No No   Sig: Take 1 tablet (45 mg total) by mouth daily at bedtime   ondansetron (Zofran ODT) 4 mg disintegrating tablet   No No   Sig: Take 1 tablet (4 mg total) by mouth every 6 (six) hours as needed for nausea or vomiting   Patient not taking: Reported on 2/26/2023   pantoprazole (PROTONIX) 20 mg tablet   No No   Sig: Take 1 tablet (20 mg total) by mouth daily   prazosin (MINIPRESS) 2 mg capsule   No No   Sig: Take 1 capsule (2 mg total) by mouth daily at bedtime   senna-docusate sodium (SENOKOT S) 8.6-50 mg per tablet   No No   Sig: Take 1 tablet by mouth 2 (two) times a day   traZODone (DESYREL) 100 mg tablet   No No   Sig: Take 1 tablet (100 mg total) by mouth daily at bedtime   ziprasidone (GEODON) 40 mg capsule   No No   Sig: Take 1 capsule (40 mg total) by mouth 2 (two) times a day with meals      Facility-Administered Medications: None       Past Medical History:   Diagnosis Date    Anxiety     Bipolar 1 disorder (HCC)     Bipolar affective disorder, depressed, severe ([address]) 10/10/2021    Borderline personality disorder (HCC)     CAD (coronary artery disease)     Cognitive impairment     Depression     Dyslipidemia     GERD (gastroesophageal reflux disease)     Hypertension     Obesity     Psychiatric disorder     Psychiatric illness     PTSD (post-traumatic stress disorder)     Schizoaffective disorder (HCC)     Seizure (HCC)        Past Surgical History:   Procedure Laterality Date    APPENDECTOMY      PATIENT DENIES HAVING APPENDIX REMOVED    CHOLECYSTECTOMY      FOOT SURGERY Right        Family History   Problem Relation Age of Onset    Kidney cancer Mother     Cerebral aneurysm Father      I have reviewed and agree with the history as documented. E-Cigarette/Vaping    E-Cigarette Use Never User      E-Cigarette/Vaping Substances    Nicotine No     THC No     CBD No     Flavoring No     Other No     Unknown No      Social History     Tobacco Use    Smoking status: Former    Smokeless tobacco: Never    Tobacco comments:     Pt stated "smokes when stressed"   Vaping Use    Vaping Use: Never used   Substance Use Topics    Alcohol use: Yes     Comment: occasionally    Drug use: Not Currently       Review of Systems   Constitutional:  Negative for chills and fever. Respiratory:  Negative for chest tightness and shortness of breath. Cardiovascular:  Negative for chest pain. Gastrointestinal:  Positive for abdominal pain. Negative for nausea and vomiting. Genitourinary:  Positive for dysuria, frequency and urgency. Skin:  Negative for color change. Neurological:  Positive for light-headedness (resolved). Negative for dizziness, weakness, numbness and headaches. Psychiatric/Behavioral:  Negative for behavioral problems and sleep disturbance. All other systems reviewed and are negative. Physical Exam  Physical Exam  Vitals and nursing note reviewed. Constitutional:       General: She is awake. Appearance: Normal appearance. She is well-developed. She is obese. HENT:      Head: Normocephalic and atraumatic. Right Ear: External ear normal.      Left Ear: External ear normal.      Nose: Nose normal.   Eyes:      General: No scleral icterus. Extraocular Movements: Extraocular movements intact. Cardiovascular:      Rate and Rhythm: Normal rate and regular rhythm. Heart sounds: Normal heart sounds, S1 normal and S2 normal. No murmur heard. No gallop. Pulmonary:      Effort: Pulmonary effort is normal.      Breath sounds: Normal breath sounds. No wheezing, rhonchi or rales. Abdominal:      General: Abdomen is protuberant.  Bowel sounds are normal.      Palpations: Abdomen is soft. Tenderness: There is abdominal tenderness in the suprapubic area. There is no right CVA tenderness or left CVA tenderness. Musculoskeletal:         General: Normal range of motion. Cervical back: Normal range of motion. Skin:     General: Skin is warm and dry. Neurological:      General: No focal deficit present. Mental Status: She is alert. Psychiatric:         Attention and Perception: Attention and perception normal.         Mood and Affect: Mood normal.         Behavior: Behavior normal. Behavior is cooperative. Vital Signs  ED Triage Vitals [10/23/23 1711]   Temperature Pulse Respirations Blood Pressure SpO2   98.7 °F (37.1 °C) 85 18 (!) 180/75 96 %      Temp Source Heart Rate Source Patient Position - Orthostatic VS BP Location FiO2 (%)   Temporal Monitor Sitting Left arm --      Pain Score       9           Vitals:    10/23/23 1711 10/23/23 1935   BP: (!) 180/75 118/65   Pulse: 85 81   Patient Position - Orthostatic VS: Sitting          Visual Acuity      ED Medications  Medications   cephalexin (KEFLEX) capsule 500 mg (500 mg Oral Given 10/23/23 1923)       Diagnostic Studies  Results Reviewed       Procedure Component Value Units Date/Time    [place] draw [[id number]] Collected: 10/23/23 1718    Lab Status: Final result Specimen: Blood from Arm, Right Updated: 10/23/23 1901    Narrative: The following orders were created for panel order [place] draw. Procedure                               Abnormality         Status                     ---------                               -----------         ------                     Evelena Steven Top on QNAN[[id number]]                           Final result               Gold top on XCDN[[id number]]                                 Final result                 Please view results for these tests on the individual orders.     Urine Microscopic [[id number]]  (Abnormal) Collected: 10/23/23 1728    Lab Status: Final result Specimen: Urine, Clean Catch Updated: 10/23/23 1759     RBC, UA None Seen /hpf      WBC, UA 4-10 /hpf      Epithelial Cells Occasional /hpf      Bacteria, UA Occasional /hpf     HS Troponin 0hr (reflex protocol) [[id number]]  (Normal) Collected: 10/23/23 1718    Lab Status: Final result Specimen: Blood from Arm, Right Updated: 10/23/23 1753     hs TnI 0hr <2 ng/L     UA w Reflex to Microscopic w Reflex to Culture [[id number]]  (Abnormal) Collected: 10/23/23 1728    Lab Status: Final result Specimen: Urine, Clean Catch Updated: 10/23/23 1747     Color, UA Yellow     Clarity, UA Clear     Specific Gravity, UA 1.020     pH, UA 5.5     Leukocytes, UA Large     Nitrite, UA Negative     Protein, UA Negative mg/dl      Glucose, UA Negative mg/dl      Ketones, UA Negative mg/dl      Urobilinogen, UA <2.0 mg/dl      Bilirubin, UA Negative     Occult Blood, UA Negative    Comprehensive metabolic panel [[id number]]  (Abnormal) Collected: 10/23/23 1718    Lab Status: Final result Specimen: Blood from Arm, Right Updated: 10/23/23 1745     Sodium 136 mmol/L      Potassium 4.2 mmol/L      Chloride 100 mmol/L      CO2 32 mmol/L      ANION GAP 4 mmol/L      BUN 14 mg/dL      Creatinine 0.62 mg/dL      Glucose 99 mg/dL      Calcium 9.3 mg/dL      AST 9 U/L      ALT 10 U/L      Alkaline Phosphatase 59 U/L      Total Protein 7.1 g/dL      Albumin 4.2 g/dL      Total Bilirubin 0.41 mg/dL      eGFR 104 ml/min/1.73sq m     Narrative:      [place] guidelines for Chronic Kidney Disease (CKD):     Stage 1 with normal or high GFR (GFR > 90 mL/min/1.73 square meters)    Stage 2 Mild CKD (GFR = 60-89 mL/min/1.73 square meters)    Stage 3A Moderate CKD (GFR = 45-59 mL/min/1.73 square meters)    Stage 3B Moderate CKD (GFR = 30-44 mL/min/1.73 square meters)    Stage 4 Severe CKD (GFR = 15-29 mL/min/1.73 square meters)    Stage 5 End Stage CKD (GFR <15 mL/min/1.73 square meters)  Note: GFR calculation is accurate only with a steady state creatinine    Lipase [[id number]]  (Normal) Collected: 10/23/23 1718    Lab Status: Final result Specimen: Blood from Arm, Right Updated: 10/23/23 1745     Lipase 12 u/L     CBC and differential [[id number]]  (Abnormal) Collected: 10/23/23 1718    Lab Status: Final result Specimen: Blood from Arm, Right Updated: 10/23/23 1723     WBC 7.86 Thousand/uL      RBC 3.75 Million/uL      Hemoglobin 11.5 g/dL      Hematocrit 34.9 %      MCV 93 fL      MCH 30.7 pg      MCHC 33.0 g/dL      RDW 14.3 %      MPV 9.6 fL      Platelets 554 Thousands/uL      nRBC 0 /100 WBCs      Neutrophils Relative 61 %      Immat GRANS % 2 %      Lymphocytes Relative 25 %      Monocytes Relative 10 %      Eosinophils Relative 2 %      Basophils Relative 0 %      Neutrophils Absolute 4.77 Thousands/µL      Immature Grans Absolute 0.15 Thousand/uL      Lymphocytes Absolute 1.96 Thousands/µL      Monocytes Absolute 0.81 Thousand/µL      Eosinophils Absolute 0.14 Thousand/µL      Basophils Absolute 0.03 Thousands/µL                    No orders to display              Procedures  ECG 12 Lead Documentation Only    Date/Time: 10/23/2023 5:12 PM    Performed by: Milady Goodman PA-C  Authorized by: Milady Goodman PA-C    Indications / Diagnosis:  Lightheadedness  Patient location:  ED  Previous ECG:     Previous ECG:  Unavailable  Interpretation:     Interpretation: normal    Rate:     ECG rate:  84    ECG rate assessment: normal    Rhythm:     Rhythm: sinus rhythm    Ectopy:     Ectopy: none    ST segments:     ST segments:  Normal           ED Course             HEART Risk Score      Flowsheet Row Most Recent Value   Heart Score Risk Calculator    History 0 Filed at: 10/23/2023 2336   ECG 0 Filed at: 10/23/2023 2336   Age 1 Filed at: 10/23/2023 2336   Risk Factors 2 Filed at: 10/23/2023 2336   Troponin 0 Filed at: 10/23/2023 2336   HEART Score 3 Filed at: 10/23/2023 2336                          SBIRT 20yo+      Flowsheet Row Most Recent Value   Initial Alcohol Screen: US AUDIT-C     1. How often do you have a drink containing alcohol? 0 Filed at: 10/23/2023 1713   2. How many drinks containing alcohol do you have on a typical day you are drinking? 0 Filed at: 10/23/2023 1713   3a. Male UNDER 65: How often do you have five or more drinks on one occasion? 0 Filed at: 10/23/2023 1713   3b. FEMALE Any Age, or MALE 65+: How often do you have 4 or more drinks on one occassion? 0 Filed at: 10/23/2023 1713   Audit-C Score 0 Filed at: 10/23/2023 1713   DAT: How many times in the past year have you. .. Used an illegal drug or used a prescription medication for non-medical reasons? Never Filed at: 10/23/2023 1713                      Medical Decision Making  46 y/o female here for UTI symptoms  Differential diagnosis including but not limited to: UTI, pyelonephritis, ACS, arrhythmia, dehydration, electrolyte abnormality, metabolic disturbance   Assessment: UTI  Plan:  heart score of 3. UA shows UTI. Will start on keflex, gave paper script as patient is living at [address] [place] [address]. She  was given strict return to ER precautions both verbally and in discharge papers. Patient verbalized understanding and agrees with plan. Here with worker from [address] [place] [address] who was given same instructions. Amount and/or Complexity of Data Reviewed  External Data Reviewed: notes. Details: reviewed notes from 8/10/23 and 8/26/23 from previous ER visits  Labs: ordered. Risk  Prescription drug management.              Disposition  Final diagnoses:   UTI (urinary tract infection)     Time reflects when diagnosis was documented in both MDM as applicable and the Disposition within this note       Time User Action Codes Description Comment    10/23/2023  7:51 PM Kelsie Garcia Add [N39.0] UTI (urinary tract infection)           ED Disposition       ED Disposition   Discharge    Condition   Stable    Date/Time   Mon Oct 23, 2023 1951    Comment   Leydi Briseno discharge to home/self care.                    Follow-up Information    None         Discharge Medication List as of 10/23/2023  7:54 PM        START taking these medications    Details   cephalexin (KEFLEX) 500 mg capsule Take 1 capsule (500 mg total) by mouth every 6 (six) hours for 7 days, Starting Mon 10/23/2023, Until Mon 10/30/2023, Print           CONTINUE these medications which have NOT CHANGED    Details   aspirin 81 mg chewable tablet Chew 1 tablet (81 mg total) daily, Starting Mon 12/19/2022, Until Wed 1/18/2023, Normal      atorvastatin (LIPITOR) 40 mg tablet Take 1 tablet (40 mg total) by mouth daily with dinner, Starting Mon 12/19/2022, Until Wed 1/18/2023, Normal      benztropine (COGENTIN) 1 mg tablet Take 1 mg by mouth 2 (two) times a day, Historical Med      cholecalciferol (VITAMIN D3) 400 units tablet Take 1 tablet (400 Units total) by mouth daily, Starting Mon 12/19/2022, Until Wed 1/18/2023, Normal      Cyanocobalamin (Vitamin B 12) 500 MCG TABS Take 1,000 mcg by mouth in the morning, Starting Mon 12/19/2022, Normal      divalproex sodium (DEPAKOTE ER) 500 mg 24 hr tablet Take 4 tablets (2,000 mg total) by mouth daily at bedtime, Starting Mon 12/19/2022, Until Wed 1/18/2023, Normal      FLUoxetine (PROzac) 40 MG capsule Take 1 capsule (40 mg total) by mouth daily Do not start before December 20, 2022., Starting Tue 12/20/2022, Until Thu 1/19/2023, Normal      fluticasone (FLONASE) 50 mcg/act nasal spray 1 spray into each nostril daily Do not start before December 20, 2022., Starting Tue 03/71/3693, Normal      folic acid (FOLVITE) 1 mg tablet Take 1 tablet (1 mg total) by mouth daily, Starting Mon 12/19/2022, Until Wed 1/18/2023, Normal      haloperidol (HALDOL) 10 mg tablet Take 1 tablet (10 mg total) by mouth 2 (two) times a day, Starting Mon 12/19/2022, Until Wed 1/18/2023, Normal      lisinopril (ZESTRIL) 20 mg tablet Take 1 tablet (20 mg total) by mouth daily, Starting Mon 12/19/2022, Until Wed 1/18/2023, Normal      mirtazapine (REMERON) 45 MG tablet Take 1 tablet (45 mg total) by mouth daily at bedtime, Starting Mon 12/19/2022, Until Wed 1/18/2023, Normal      ondansetron (Zofran ODT) 4 mg disintegrating tablet Take 1 tablet (4 mg total) by mouth every 6 (six) hours as needed for nausea or vomiting, Starting u 1/26/2023, Normal      pantoprazole (PROTONIX) 20 mg tablet Take 1 tablet (20 mg total) by mouth daily, Starting u 1/26/2023, Normal      prazosin (MINIPRESS) 2 mg capsule Take 1 capsule (2 mg total) by mouth daily at bedtime, Starting Mon 12/19/2022, Until Wed 1/18/2023, Normal      senna-docusate sodium (SENOKOT S) 8.6-50 mg per tablet Take 1 tablet by mouth 2 (two) times a day, Starting Mon 12/19/2022, Until Wed 1/18/2023, Normal      traZODone (DESYREL) 100 mg tablet Take 1 tablet (100 mg total) by mouth daily at bedtime, Starting Mon 12/19/2022, Until Wed 1/18/2023, Normal      ziprasidone (GEODON) 40 mg capsule Take 1 capsule (40 mg total) by mouth 2 (two) times a day with meals, Starting Mon 12/19/2022, Until Wed 1/18/2023, Normal             No discharge procedures on file.     PDMP Review         Value Time User    PDMP Reviewed  Yes 5/6/2022 10:38 AM Margie Simmonds, MD            ED Provider  Electronically Signed by             Farhad Ordonez PA-C  10/23/23 2347

## 2023-10-24 LAB
ATRIAL RATE: 84 BPM
P AXIS: 45 DEGREES
PR INTERVAL: 182 MS
QRS AXIS: 18 DEGREES
QRSD INTERVAL: 84 MS
QT INTERVAL: 368 MS
QTC INTERVAL: 434 MS
T WAVE AXIS: 42 DEGREES
VENTRICULAR RATE: 84 BPM

## 2023-10-24 PROCEDURE — 93010 ELECTROCARDIOGRAM REPORT: CPT | Performed by: INTERNAL MEDICINE

## 2023-11-02 ENCOUNTER — HOSPITAL ENCOUNTER (EMERGENCY)
Facility: HOSPITAL | Age: 53
End: 2023-11-03
Attending: EMERGENCY MEDICINE
Payer: MEDICARE

## 2023-11-02 VITALS
SYSTOLIC BLOOD PRESSURE: 134 MMHG | OXYGEN SATURATION: 97 % | HEART RATE: 89 BPM | TEMPERATURE: 98 F | RESPIRATION RATE: 20 BRPM | DIASTOLIC BLOOD PRESSURE: 69 MMHG

## 2023-11-02 DIAGNOSIS — R45.851 SUICIDAL IDEATIONS: Primary | ICD-10-CM

## 2023-11-02 DIAGNOSIS — F32.A DEPRESSION: ICD-10-CM

## 2023-11-02 LAB
ALBUMIN SERPL BCP-MCNC: 4.1 G/DL (ref 3.5–5)
ALP SERPL-CCNC: 58 U/L (ref 34–104)
ALT SERPL W P-5'-P-CCNC: 10 U/L (ref 7–52)
AMPHETAMINES SERPL QL SCN: NEGATIVE
ANION GAP SERPL CALCULATED.3IONS-SCNC: 5 MMOL/L
AST SERPL W P-5'-P-CCNC: 10 U/L (ref 13–39)
BACTERIA UR QL AUTO: ABNORMAL /HPF
BARBITURATES UR QL: NEGATIVE
BASOPHILS # BLD AUTO: 0.03 THOUSANDS/ÂΜL (ref 0–0.1)
BASOPHILS NFR BLD AUTO: 0 % (ref 0–1)
BENZODIAZ UR QL: NEGATIVE
BILIRUB SERPL-MCNC: 0.34 MG/DL (ref 0.2–1)
BILIRUB UR QL STRIP: NEGATIVE
BUN SERPL-MCNC: 12 MG/DL (ref 5–25)
CALCIUM SERPL-MCNC: 9.2 MG/DL (ref 8.4–10.2)
CHLORIDE SERPL-SCNC: 101 MMOL/L (ref 96–108)
CLARITY UR: CLEAR
CO2 SERPL-SCNC: 30 MMOL/L (ref 21–32)
COCAINE UR QL: NEGATIVE
COLOR UR: YELLOW
CREAT SERPL-MCNC: 0.61 MG/DL (ref 0.6–1.3)
EOSINOPHIL # BLD AUTO: 0.26 THOUSAND/ÂΜL (ref 0–0.61)
EOSINOPHIL NFR BLD AUTO: 4 % (ref 0–6)
ERYTHROCYTE [DISTWIDTH] IN BLOOD BY AUTOMATED COUNT: 14.4 % (ref 11.6–15.1)
ETHANOL EXG-MCNC: 0 MG/DL
ETHANOL SERPL-MCNC: <10 MG/DL
FLUAV RNA RESP QL NAA+PROBE: NEGATIVE
FLUBV RNA RESP QL NAA+PROBE: NEGATIVE
GFR SERPL CREATININE-BSD FRML MDRD: 104 ML/MIN/1.73SQ M
GLUCOSE SERPL-MCNC: 107 MG/DL (ref 65–140)
GLUCOSE UR STRIP-MCNC: NEGATIVE MG/DL
HCT VFR BLD AUTO: 33.5 % (ref 34.8–46.1)
HGB BLD-MCNC: 10.9 G/DL (ref 11.5–15.4)
HGB UR QL STRIP.AUTO: NEGATIVE
IMM GRANULOCYTES # BLD AUTO: 0.12 THOUSAND/UL (ref 0–0.2)
IMM GRANULOCYTES NFR BLD AUTO: 2 % (ref 0–2)
KETONES UR STRIP-MCNC: NEGATIVE MG/DL
LEUKOCYTE ESTERASE UR QL STRIP: ABNORMAL
LYMPHOCYTES # BLD AUTO: 1.89 THOUSANDS/ÂΜL (ref 0.6–4.47)
LYMPHOCYTES NFR BLD AUTO: 28 % (ref 14–44)
MCH RBC QN AUTO: 30.8 PG (ref 26.8–34.3)
MCHC RBC AUTO-ENTMCNC: 32.5 G/DL (ref 31.4–37.4)
MCV RBC AUTO: 95 FL (ref 82–98)
MONOCYTES # BLD AUTO: 0.81 THOUSAND/ÂΜL (ref 0.17–1.22)
MONOCYTES NFR BLD AUTO: 12 % (ref 4–12)
NEUTROPHILS # BLD AUTO: 3.77 THOUSANDS/ÂΜL (ref 1.85–7.62)
NEUTS SEG NFR BLD AUTO: 54 % (ref 43–75)
NITRITE UR QL STRIP: NEGATIVE
NON-SQ EPI CELLS URNS QL MICRO: ABNORMAL /HPF
NRBC BLD AUTO-RTO: 0 /100 WBCS
OPIATES UR QL SCN: NEGATIVE
OXYCODONE+OXYMORPHONE UR QL SCN: NEGATIVE
PCP UR QL: NEGATIVE
PH UR STRIP.AUTO: 5.5 [PH]
PLATELET # BLD AUTO: 158 THOUSANDS/UL (ref 149–390)
PMV BLD AUTO: 9.3 FL (ref 8.9–12.7)
POTASSIUM SERPL-SCNC: 3.9 MMOL/L (ref 3.5–5.3)
PROT SERPL-MCNC: 6.9 G/DL (ref 6.4–8.4)
PROT UR STRIP-MCNC: NEGATIVE MG/DL
RBC # BLD AUTO: 3.54 MILLION/UL (ref 3.81–5.12)
RBC #/AREA URNS AUTO: ABNORMAL /HPF
RSV RNA RESP QL NAA+PROBE: NEGATIVE
SARS-COV-2 RNA RESP QL NAA+PROBE: NEGATIVE
SODIUM SERPL-SCNC: 136 MMOL/L (ref 135–147)
SP GR UR STRIP.AUTO: 1.01 (ref 1–1.03)
THC UR QL: NEGATIVE
TSH SERPL DL<=0.05 MIU/L-ACNC: 3.88 UIU/ML (ref 0.45–4.5)
UROBILINOGEN UR STRIP-ACNC: <2 MG/DL
WBC # BLD AUTO: 6.88 THOUSAND/UL (ref 4.31–10.16)
WBC #/AREA URNS AUTO: ABNORMAL /HPF

## 2023-11-02 PROCEDURE — 84443 ASSAY THYROID STIM HORMONE: CPT | Performed by: EMERGENCY MEDICINE

## 2023-11-02 PROCEDURE — 81001 URINALYSIS AUTO W/SCOPE: CPT | Performed by: EMERGENCY MEDICINE

## 2023-11-02 PROCEDURE — 80307 DRUG TEST PRSMV CHEM ANLYZR: CPT | Performed by: EMERGENCY MEDICINE

## 2023-11-02 PROCEDURE — 99285 EMERGENCY DEPT VISIT HI MDM: CPT

## 2023-11-02 PROCEDURE — 80053 COMPREHEN METABOLIC PANEL: CPT | Performed by: EMERGENCY MEDICINE

## 2023-11-02 PROCEDURE — 99285 EMERGENCY DEPT VISIT HI MDM: CPT | Performed by: EMERGENCY MEDICINE

## 2023-11-02 PROCEDURE — 36415 COLL VENOUS BLD VENIPUNCTURE: CPT | Performed by: EMERGENCY MEDICINE

## 2023-11-02 PROCEDURE — 82075 ASSAY OF BREATH ETHANOL: CPT | Performed by: EMERGENCY MEDICINE

## 2023-11-02 PROCEDURE — 85025 COMPLETE CBC W/AUTO DIFF WBC: CPT | Performed by: EMERGENCY MEDICINE

## 2023-11-02 PROCEDURE — 82077 ASSAY SPEC XCP UR&BREATH IA: CPT | Performed by: EMERGENCY MEDICINE

## 2023-11-02 PROCEDURE — 93005 ELECTROCARDIOGRAM TRACING: CPT

## 2023-11-02 PROCEDURE — 0241U HB NFCT DS VIR RESP RNA 4 TRGT: CPT | Performed by: EMERGENCY MEDICINE

## 2023-11-02 RX ORDER — MAGNESIUM HYDROXIDE/ALUMINUM HYDROXICE/SIMETHICONE 120; 1200; 1200 MG/30ML; MG/30ML; MG/30ML
30 SUSPENSION ORAL EVERY 4 HOURS PRN
Status: CANCELLED | OUTPATIENT
Start: 2023-11-02

## 2023-11-02 RX ORDER — RISPERIDONE 2 MG/1
2 TABLET ORAL 2 TIMES DAILY
Status: DISCONTINUED | OUTPATIENT
Start: 2023-11-02 | End: 2023-11-03 | Stop reason: HOSPADM

## 2023-11-02 RX ORDER — TRAZODONE HYDROCHLORIDE 50 MG/1
100 TABLET ORAL
Status: DISCONTINUED | OUTPATIENT
Start: 2023-11-02 | End: 2023-11-03 | Stop reason: HOSPADM

## 2023-11-02 RX ORDER — LORAZEPAM 1 MG/1
1 TABLET ORAL EVERY 8 HOURS PRN
Status: ON HOLD | COMMUNITY

## 2023-11-02 RX ORDER — VENLAFAXINE HYDROCHLORIDE 37.5 MG/1
37.5 CAPSULE, EXTENDED RELEASE ORAL DAILY
Status: DISCONTINUED | OUTPATIENT
Start: 2023-11-03 | End: 2023-11-03 | Stop reason: HOSPADM

## 2023-11-02 RX ORDER — AMOXICILLIN 250 MG
2 CAPSULE ORAL
Status: DISCONTINUED | OUTPATIENT
Start: 2023-11-02 | End: 2023-11-03 | Stop reason: HOSPADM

## 2023-11-02 RX ORDER — FLUOXETINE 10 MG/1
40 CAPSULE ORAL DAILY
Status: DISCONTINUED | OUTPATIENT
Start: 2023-11-03 | End: 2023-11-03 | Stop reason: HOSPADM

## 2023-11-02 RX ORDER — ACETAMINOPHEN 325 MG/1
650 TABLET ORAL EVERY 6 HOURS PRN
Status: DISCONTINUED | OUTPATIENT
Start: 2023-11-02 | End: 2023-11-03 | Stop reason: HOSPADM

## 2023-11-02 RX ORDER — LANOLIN ALCOHOL/MO/W.PET/CERES
3 CREAM (GRAM) TOPICAL
Status: CANCELLED | OUTPATIENT
Start: 2023-11-02

## 2023-11-02 RX ORDER — LORAZEPAM 2 MG/ML
1 INJECTION INTRAMUSCULAR
Status: CANCELLED | OUTPATIENT
Start: 2023-11-02

## 2023-11-02 RX ORDER — PANTOPRAZOLE SODIUM 20 MG/1
20 TABLET, DELAYED RELEASE ORAL
Status: DISCONTINUED | OUTPATIENT
Start: 2023-11-02 | End: 2023-11-03 | Stop reason: HOSPADM

## 2023-11-02 RX ORDER — RISPERIDONE 0.25 MG/1
0.25 TABLET ORAL
Status: CANCELLED | OUTPATIENT
Start: 2023-11-02

## 2023-11-02 RX ORDER — LORATADINE 10 MG/1
10 TABLET ORAL DAILY PRN
Status: DISCONTINUED | OUTPATIENT
Start: 2023-11-02 | End: 2023-11-03 | Stop reason: HOSPADM

## 2023-11-02 RX ORDER — VENLAFAXINE HYDROCHLORIDE 37.5 MG/1
37.5 CAPSULE, EXTENDED RELEASE ORAL DAILY
Status: ON HOLD | COMMUNITY

## 2023-11-02 RX ORDER — CEPHALEXIN 250 MG/1
500 CAPSULE ORAL EVERY 8 HOURS SCHEDULED
Status: DISCONTINUED | OUTPATIENT
Start: 2023-11-02 | End: 2023-11-03 | Stop reason: HOSPADM

## 2023-11-02 RX ORDER — FLUTICASONE PROPIONATE 50 MCG
1 SPRAY, SUSPENSION (ML) NASAL DAILY PRN
Status: DISCONTINUED | OUTPATIENT
Start: 2023-11-02 | End: 2023-11-03 | Stop reason: HOSPADM

## 2023-11-02 RX ORDER — LORAZEPAM 1 MG/1
1 TABLET ORAL 2 TIMES DAILY PRN
Status: DISCONTINUED | OUTPATIENT
Start: 2023-11-02 | End: 2023-11-03 | Stop reason: HOSPADM

## 2023-11-02 RX ORDER — HYDROXYZINE HYDROCHLORIDE 25 MG/1
25 TABLET, FILM COATED ORAL
Status: CANCELLED | OUTPATIENT
Start: 2023-11-02

## 2023-11-02 RX ORDER — SENNA AND DOCUSATE SODIUM 50; 8.6 MG/1; MG/1
1 TABLET, FILM COATED ORAL DAILY
Status: ON HOLD | COMMUNITY
End: 2023-11-03

## 2023-11-02 RX ORDER — MECLIZINE HYDROCHLORIDE 25 MG/1
25 TABLET ORAL 2 TIMES DAILY
Status: ON HOLD | COMMUNITY

## 2023-11-02 RX ORDER — RISPERIDONE 0.25 MG/1
0.5 TABLET ORAL
Status: CANCELLED | OUTPATIENT
Start: 2023-11-02

## 2023-11-02 RX ORDER — HALOPERIDOL 5 MG/ML
5 INJECTION INTRAMUSCULAR
Status: CANCELLED | OUTPATIENT
Start: 2023-11-02

## 2023-11-02 RX ORDER — RISPERIDONE 2 MG/1
2 TABLET, ORALLY DISINTEGRATING ORAL EVERY 12 HOURS
Status: ON HOLD | COMMUNITY

## 2023-11-02 RX ORDER — MECLIZINE HCL 12.5 MG/1
25 TABLET ORAL EVERY 12 HOURS PRN
Status: DISCONTINUED | OUTPATIENT
Start: 2023-11-02 | End: 2023-11-03 | Stop reason: HOSPADM

## 2023-11-02 RX ORDER — PRAZOSIN HYDROCHLORIDE 1 MG/1
2 CAPSULE ORAL
Status: DISCONTINUED | OUTPATIENT
Start: 2023-11-02 | End: 2023-11-03 | Stop reason: HOSPADM

## 2023-11-02 RX ORDER — DIVALPROEX SODIUM 250 MG/1
1000 TABLET, DELAYED RELEASE ORAL
Status: DISCONTINUED | OUTPATIENT
Start: 2023-11-02 | End: 2023-11-03 | Stop reason: HOSPADM

## 2023-11-02 RX ORDER — RISPERIDONE 1 MG/1
1 TABLET ORAL
Status: CANCELLED | OUTPATIENT
Start: 2023-11-02

## 2023-11-02 RX ORDER — CEPHALEXIN 500 MG/1
500 CAPSULE ORAL EVERY 6 HOURS SCHEDULED
Status: ON HOLD | COMMUNITY

## 2023-11-02 RX ORDER — BENZTROPINE MESYLATE 0.5 MG/1
1 TABLET ORAL DAILY PRN
Status: DISCONTINUED | OUTPATIENT
Start: 2023-11-02 | End: 2023-11-03 | Stop reason: HOSPADM

## 2023-11-02 RX ORDER — ACETAMINOPHEN 80 MG/1
500 TABLET, CHEWABLE ORAL EVERY 6 HOURS PRN
Status: ON HOLD | COMMUNITY

## 2023-11-02 RX ADMIN — PRAZOSIN HYDROCHLORIDE 2 MG: 1 CAPSULE ORAL at 21:45

## 2023-11-02 RX ADMIN — PANTOPRAZOLE SODIUM 20 MG: 20 TABLET, DELAYED RELEASE ORAL at 19:57

## 2023-11-02 RX ADMIN — RISPERIDONE 2 MG: 2 TABLET ORAL at 19:57

## 2023-11-02 RX ADMIN — CEPHALEXIN 500 MG: 250 CAPSULE ORAL at 21:44

## 2023-11-02 RX ADMIN — TRAZODONE HYDROCHLORIDE 100 MG: 50 TABLET ORAL at 21:44

## 2023-11-02 RX ADMIN — DIVALPROEX SODIUM 1000 MG: 250 TABLET, DELAYED RELEASE ORAL at 21:45

## 2023-11-02 NOTE — ED NOTES
201 and face sheet faxed to Intake. Pt requested not to be referred to [facility].

## 2023-11-02 NOTE — ED NOTES
Manfred Balderas    Recipient ID: [id number]    Primary:  MEDICARE PART B 11/02/2023 11/02/2023  MEDICARE PART A 11/02/2023 11/02/2023    Secondary:  [place]  Information Contact  Telephone: [phone]    [facility]  Telephone: [phone]
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
## 2023-11-02 NOTE — ED PROVIDER NOTES
History  Chief Complaint   Patient presents with    Psychiatric Evaluation     Pt to er with staff member reports of having suicidal thoughts with plan to walk in front of car. Reports having "voices in her head telling her that no one wants to be around her and that she is ugly and fat, and is having money problems"      21-year-old female from group home with history of anxiety, bipolar, depression, GERD, PTSD presents for evaluation of worsening depression as well as suicidal thoughts of walking in front of a car, states that the voices in her head have been bothering her more stating that she is fat and ugly. She has been cutting herself on the left forearm, superficial scratches consistent with self-harm behavior no specific suicidal attempt. States That she has been compliant with her medications, no specific medical complaints    On chart review patient's last Tdap was in 2017        Prior to Admission Medications   Prescriptions Last Dose Informant Patient Reported? Taking? Cyanocobalamin (Vitamin B 12) 500 MCG TABS   No No   Sig: Take 1,000 mcg by mouth in the morning   FLUoxetine (PROzac) 40 MG capsule  Self No Yes   Sig: Take 1 capsule (40 mg total) by mouth daily Do not start before December 20, 2022.    LORazepam (ATIVAN) 1 mg tablet   Yes Yes   Sig: Take 1 mg by mouth every 8 (eight) hours as needed for anxiety   acetaminophen (TYLENOL) 80 mg chewable tablet   Yes Yes   Sig: Chew 500 mg every 6 (six) hours as needed for mild pain   aspirin 81 mg chewable tablet   No No   Sig: Chew 1 tablet (81 mg total) daily   atorvastatin (LIPITOR) 40 mg tablet   No No   Sig: Take 1 tablet (40 mg total) by mouth daily with dinner   benztropine (COGENTIN) 1 mg tablet   Yes No   Sig: Take 1 mg by mouth 2 (two) times a day   cephalexin (KEFLEX) 500 mg capsule   Yes Yes   Sig: Take 500 mg by mouth every 6 (six) hours   cholecalciferol (VITAMIN D3) 400 units tablet   No No   Sig: Take 1 tablet (400 Units total) by mouth daily   divalproex sodium (DEPAKOTE ER) 500 mg 24 hr tablet   No Yes   Sig: Take 4 tablets (2,000 mg total) by mouth daily at bedtime   fluticasone (FLONASE) 50 mcg/act nasal spray   No Yes   Si spray into each nostril daily Do not start before 2022.    folic acid (FOLVITE) 1 mg tablet   No No   Sig: Take 1 tablet (1 mg total) by mouth daily   haloperidol (HALDOL) 10 mg tablet   No No   Sig: Take 1 tablet (10 mg total) by mouth 2 (two) times a day   lisinopril (ZESTRIL) 20 mg tablet  Self No No   Sig: Take 1 tablet (20 mg total) by mouth daily   meclizine (ANTIVERT) 25 mg tablet   Yes Yes   Sig: Take 25 mg by mouth 2 (two) times a day   mirtazapine (REMERON) 45 MG tablet   No No   Sig: Take 1 tablet (45 mg total) by mouth daily at bedtime   ondansetron (Zofran ODT) 4 mg disintegrating tablet   No No   Sig: Take 1 tablet (4 mg total) by mouth every 6 (six) hours as needed for nausea or vomiting   Patient not taking: Reported on 2023   pantoprazole (PROTONIX) 20 mg tablet   No Yes   Sig: Take 1 tablet (20 mg total) by mouth daily   prazosin (MINIPRESS) 2 mg capsule   No Yes   Sig: Take 1 capsule (2 mg total) by mouth daily at bedtime   risperiDONE (RisperDAL M-TAB) 2 mg disintegrating tablet   Yes Yes   Sig: Take 2 mg by mouth 2 (two) times a day   senna-docusate sodium (SENOKOT S) 8.6-50 mg per tablet   No No   Sig: Take 1 tablet by mouth 2 (two) times a day   senna-docusate sodium (SENOKOT-S) 8.6-50 mg per tablet   Yes Yes   Sig: Take 1 tablet by mouth daily   traZODone (DESYREL) 100 mg tablet   No Yes   Sig: Take 1 tablet (100 mg total) by mouth daily at bedtime   venlafaxine (EFFEXOR-XR) 37.5 mg 24 hr capsule   Yes Yes   Sig: Take 37.5 mg by mouth daily   ziprasidone (GEODON) 40 mg capsule   No No   Sig: Take 1 capsule (40 mg total) by mouth 2 (two) times a day with meals      Facility-Administered Medications: None       Past Medical History:   Diagnosis Date    Anxiety     Bipolar 1 disorder ([address])     Bipolar affective disorder, depressed, severe ([address]) 10/10/2021    Borderline personality disorder ([address])     CAD (coronary artery disease)     Cognitive impairment     Depression     Dyslipidemia     GERD (gastroesophageal reflux disease)     Hypertension     Obesity     Psychiatric disorder     Psychiatric illness     PTSD (post-traumatic stress disorder)     Schizoaffective disorder (HCC)     Seizure ([address])        Past Surgical History:   Procedure Laterality Date    APPENDECTOMY      PATIENT DENIES HAVING APPENDIX REMOVED    CHOLECYSTECTOMY      FOOT SURGERY Right        Family History   Problem Relation Age of Onset    Kidney cancer Mother     Cerebral aneurysm Father      I have reviewed and agree with the history as documented. E-Cigarette/Vaping    E-Cigarette Use Never User      E-Cigarette/Vaping Substances    Nicotine No     THC No     CBD No     Flavoring No     Other No     Unknown No      Social History     Tobacco Use    Smoking status: Former    Smokeless tobacco: Never    Tobacco comments:     Pt stated "smokes when stressed"   Vaping Use    Vaping Use: Never used   Substance Use Topics    Alcohol use: Yes     Comment: occasionally    Drug use: Not Currently       Review of Systems   Constitutional:  Negative for appetite change and fever. HENT:  Negative for rhinorrhea and sore throat. Eyes:  Negative for photophobia and visual disturbance. Respiratory:  Negative for cough, chest tightness and wheezing. Cardiovascular:  Negative for chest pain, palpitations and leg swelling. Gastrointestinal:  Negative for abdominal distention, abdominal pain, blood in stool, constipation and diarrhea. Genitourinary:  Negative for dysuria, flank pain, frequency, hematuria and urgency. Musculoskeletal:  Negative for back pain. Skin:  Negative for rash. Neurological:  Negative for dizziness, weakness and headaches.    Psychiatric/Behavioral:  Positive for dysphoric mood, hallucinations, self-injury and suicidal ideas. Negative for decreased concentration. The patient is nervous/anxious. All other systems reviewed and are negative. Physical Exam  Physical Exam  Vitals and nursing note reviewed. Constitutional:       Appearance: She is well-developed. HENT:      Head: Normocephalic and atraumatic. Eyes:      Pupils: Pupils are equal, round, and reactive to light. Cardiovascular:      Rate and Rhythm: Normal rate and regular rhythm. Heart sounds: No murmur heard. No friction rub. No gallop. Pulmonary:      Effort: Pulmonary effort is normal.      Breath sounds: No wheezing or rales. Chest:      Chest wall: No tenderness. Abdominal:      General: There is no distension. Palpations: Abdomen is soft. There is no mass. Tenderness: There is no guarding or rebound. Musculoskeletal:      Cervical back: Normal range of motion and neck supple. Comments: Superficial vertical scratches on left forearm, no deep lacerations, no active bleeding, no overlying signs of infection   Skin:     General: Skin is warm and dry. Neurological:      Mental Status: She is alert and oriented to person, place, and time.          Vital Signs  ED Triage Vitals [11/02/23 1734]   Temperature Pulse Respirations Blood Pressure SpO2   98 °F (36.7 °C) 89 20 139/67 97 %      Temp Source Heart Rate Source Patient Position - Orthostatic VS BP Location FiO2 (%)   Temporal Monitor Sitting Left arm --      Pain Score       --           Vitals:    11/02/23 1734 11/02/23 2145   BP: 139/67 134/69   Pulse: 89    Patient Position - Orthostatic VS: Sitting          Visual Acuity      ED Medications  Medications   cephalexin (KEFLEX) capsule 500 mg (500 mg Oral Given 11/2/23 2144)   divalproex sodium (DEPAKOTE) DR tablet 1,000 mg (1,000 mg Oral Given 11/2/23 2145)   FLUoxetine (PROzac) capsule 40 mg (has no administration in time range)   meclizine (ANTIVERT) tablet 25 mg (has no administration in time range)   pantoprazole (PROTONIX) EC tablet 20 mg (20 mg Oral Given 11/2/23 1957)   prazosin (MINIPRESS) capsule 2 mg (2 mg Oral Given 11/2/23 2145)   risperiDONE (RisperDAL) tablet 2 mg (2 mg Oral Given 11/2/23 1957)   senna-docusate sodium (SENOKOT S) 8.6-50 mg per tablet 2 tablet (2 tablets Oral Not Given 11/2/23 2144)   traZODone (DESYREL) tablet 100 mg (100 mg Oral Given 11/2/23 2144)   venlafaxine (EFFEXOR-XR) 24 hr capsule 37.5 mg (has no administration in time range)   acetaminophen (TYLENOL) tablet 650 mg (has no administration in time range)   benztropine (COGENTIN) tablet 1 mg (has no administration in time range)   loratadine (CLARITIN) tablet 10 mg (has no administration in time range)   fluticasone (FLONASE) 50 mcg/act nasal spray 1 spray (has no administration in time range)   LORazepam (ATIVAN) tablet 1 mg (has no administration in time range)       Diagnostic Studies  Results Reviewed       Procedure Component Value Units Date/Time    Urine Microscopic [[id number]]  (Abnormal) Collected: 11/02/23 1926    Lab Status: Final result Specimen: Urine, Other Updated: 11/02/23 2116     RBC, UA None Seen /hpf      WBC, UA 4-10 /hpf      Epithelial Cells Occasional /hpf      Bacteria, UA Occasional /hpf     UA w Reflex to Microscopic w Reflex to Culture [[id number]]  (Abnormal) Collected: 11/02/23 1926    Lab Status: Final result Specimen: Urine, Other Updated: 11/02/23 2112     Color, UA Yellow     Clarity, UA Clear     Specific Gravity, UA 1.015     pH, UA 5.5     Leukocytes, UA Moderate     Nitrite, UA Negative     Protein, UA Negative mg/dl      Glucose, UA Negative mg/dl      Ketones, UA Negative mg/dl      Urobilinogen, UA <2.0 mg/dl      Bilirubin, UA Negative     Occult Blood, UA Negative    Rapid drug screen, urine [[id number]] Collected: 11/02/23 1926    Lab Status: Final result Specimen: Urine, Other Updated: 11/02/23 2004     Amph/Meth UR Negative     Barbiturate Ur Negative Benzodiazepine Urine Negative     Cocaine Urine Negative     Methadone Urine --     Opiate Urine Negative     PCP Ur Negative     THC Urine Negative     Oxycodone Urine Negative    Narrative:      FOR MEDICAL PURPOSES ONLY. IF CONFIRMATION NEEDED PLEASE CONTACT THE LAB WITHIN 5 DAYS. Drug Screen Cutoff Levels:  AMPHETAMINE/METHAMPHETAMINES  1000 ng/mL  BARBITURATES     200 ng/mL  BENZODIAZEPINES     200 ng/mL  COCAINE      300 ng/mL  METHADONE      300 ng/mL  OPIATES      300 ng/mL  PHENCYCLIDINE     25 ng/mL  THC       50 ng/mL  OXYCODONE      100 ng/mL    FLU/RSV/COVID - if FLU/RSV clinically relevant [[id number]]  (Normal) Collected: 11/02/23 1806    Lab Status: Final result Specimen: Nares from Nose Updated: 11/02/23 1854     SARS-CoV-2 Negative     INFLUENZA A PCR Negative     INFLUENZA B PCR Negative     RSV PCR Negative    Narrative:      FOR PEDIATRIC PATIENTS - copy/paste COVID Guidelines URL to browser: [email or web address]    SARS-CoV-2 assay is a Nucleic Acid Amplification assay intended for the  qualitative detection of nucleic acid from SARS-CoV-2 in nasopharyngeal  swabs. Results are for the presumptive identification of SARS-CoV-2 RNA. Positive results are indicative of infection with SARS-CoV-2, the virus  causing COVID-19, but do not rule out bacterial infection or co-infection  with other viruses. Laboratories within the [facility] and its  territories are required to report all positive results to the appropriate  public health authorities. Negative results do not preclude SARS-CoV-2  infection and should not be used as the sole basis for treatment or other  patient management decisions. Negative results must be combined with  clinical observations, patient history, and epidemiological information. This test has not been FDA cleared or approved. This test has been authorized by FDA under an Emergency Use Authorization  (EUA). This test is only authorized for the duration of time the  declaration that circumstances exist justifying the authorization of the  emergency use of an in vitro diagnostic tests for detection of SARS-CoV-2  virus and/or diagnosis of COVID-19 infection under section 564(b)(1) of  the Act, 21 U. S.C. 052OSH-4(U)(6), unless the authorization is terminated  or revoked sooner. The test has been validated but independent review by FDA  and CLIA is pending. Test performed using [employer]: This RT-PCR assay targets N2,  a region unique to SARS-CoV-2. A conserved region in the E-gene was chosen  for pan-Sarbecovirus detection which includes SARS-CoV-2. According to CMS-2020-01-R, this platform meets the definition of high-throughput technology.     TSH [[id number]]  (Normal) Collected: 11/02/23 1806    Lab Status: Final result Specimen: Blood from Arm, Right Updated: 11/02/23 1846     TSH 3RD GENERATON 3.882 uIU/mL     Comprehensive metabolic panel [[id number]]  (Abnormal) Collected: 11/02/23 1806    Lab Status: Final result Specimen: Blood from Arm, Right Updated: 11/02/23 1829     Sodium 136 mmol/L      Potassium 3.9 mmol/L      Chloride 101 mmol/L      CO2 30 mmol/L      ANION GAP 5 mmol/L      BUN 12 mg/dL      Creatinine 0.61 mg/dL      Glucose 107 mg/dL      Calcium 9.2 mg/dL      AST 10 U/L      ALT 10 U/L      Alkaline Phosphatase 58 U/L      Total Protein 6.9 g/dL      Albumin 4.1 g/dL      Total Bilirubin 0.34 mg/dL      eGFR 104 ml/min/1.73sq m     Narrative:      [place] guidelines for Chronic Kidney Disease (CKD):     Stage 1 with normal or high GFR (GFR > 90 mL/min/1.73 square meters)    Stage 2 Mild CKD (GFR = 60-89 mL/min/1.73 square meters)    Stage 3A Moderate CKD (GFR = 45-59 mL/min/1.73 square meters)    Stage 3B Moderate CKD (GFR = 30-44 mL/min/1.73 square meters)    Stage 4 Severe CKD (GFR = 15-29 mL/min/1.73 square meters)    Stage 5 End Stage CKD (GFR <15 mL/min/1.73 square meters)  Note: GFR calculation is accurate only with a steady state creatinine    Ethanol [[id number]]  (Normal) Collected: 11/02/23 1806    Lab Status: Final result Specimen: Blood from Arm, Right Updated: 11/02/23 1828     Ethanol Lvl <10 mg/dL     CBC and differential [[id number]]  (Abnormal) Collected: 11/02/23 1806    Lab Status: Final result Specimen: Blood from Arm, Right Updated: 11/02/23 1815     WBC 6.88 Thousand/uL      RBC 3.54 Million/uL      Hemoglobin 10.9 g/dL      Hematocrit 33.5 %      MCV 95 fL      MCH 30.8 pg      MCHC 32.5 g/dL      RDW 14.4 %      MPV 9.3 fL      Platelets 948 Thousands/uL      nRBC 0 /100 WBCs      Neutrophils Relative 54 %      Immat GRANS % 2 %      Lymphocytes Relative 28 %      Monocytes Relative 12 %      Eosinophils Relative 4 %      Basophils Relative 0 %      Neutrophils Absolute 3.77 Thousands/µL      Immature Grans Absolute 0.12 Thousand/uL      Lymphocytes Absolute 1.89 Thousands/µL      Monocytes Absolute 0.81 Thousand/µL      Eosinophils Absolute 0.26 Thousand/µL      Basophils Absolute 0.03 Thousands/µL     POCT alcohol breath test [[id number]]  (Normal) Resulted: 11/02/23 1802    Lab Status: Final result Updated: 11/02/23 1802     EXTBreath Alcohol 0.000                   No orders to display              Procedures  Procedures         ED Course  ED Course as of 11/02/23 2201   Thu Nov 02, 2023   1741 Patient seen for general physical examination 10/24/2023, started on antibiotics for UTI otherwise stable chronic conditions   1809 Procedure Note: EKG  Date/Time: 11/02/23 6:09 PM   Performed by: Donnell Bosworth  Authorized by: Donnell Bosworth  Indications / Diagnosis: Psych  ECG reviewed by me, the ED Provider: yes   The EKG demonstrates:  Rhythm: normal sinus  Intervals: normal intervals  Axis: normal axis  QRS/Blocks: normal QRS  ST Changes:No acute ST Changes, no STD/MYESHA.       1845 Hemoglobin(!): 10.9  Similar to baseline SBIRT 20yo+      Flowsheet Row Most Recent Value   Initial Alcohol Screen: US AUDIT-C     1. How often do you have a drink containing alcohol? 0 Filed at: 11/02/2023 1735   2. How many drinks containing alcohol do you have on a typical day you are drinking? 0 Filed at: 11/02/2023 1735   3a. Male UNDER 65: How often do you have five or more drinks on one occasion? 0 Filed at: 11/02/2023 1735   3b. FEMALE Any Age, or MALE 65+: How often do you have 4 or more drinks on one occassion? 0 Filed at: 11/02/2023 1735   Audit-C Score 0 Filed at: 11/02/2023 1735   DAT: How many times in the past year have you. .. Used an illegal drug or used a prescription medication for non-medical reasons? Never Filed at: 11/02/2023 1735                      Medical Decision Making  45-year-old female with worsening depression, hallucinations, suicidal thoughts with thoughts of walking in front of the car. EKG to screen for arrhythmia, lab work to evaluate for electrolyte abnormalities, TSH evaluate for hyperthyroidism as part of medical clearance,  Will have crisis evaluate patient    Amount and/or Complexity of Data Reviewed  Labs: ordered. Decision-making details documented in ED Course. Risk  OTC drugs. Prescription drug management. Decision regarding hospitalization. Disposition  Final diagnoses:   Suicidal ideations   Depression     Time reflects when diagnosis was documented in both MDM as applicable and the Disposition within this note       Time User Action Codes Description Comment    11/2/2023  6:45 PM Raphael Puga [I46.134] Suicidal ideations     11/2/2023  6:45 PM Raphael Puga [email or web address]. A] Depression           ED Disposition       ED Disposition   Transfer to [facility]   --    Date/Time   Thu Nov 2, 2023 3431    Comment   Leydi Taylor should be transferred out to [facility] and has been medically cleared.                MD Documentation      [facility] Recent Value   Patient Condition The patient has been stabilized such that within reasonable medical probability, no material deterioration of the patient condition or the condition of the unborn child(courtney) is likely to result from the transfer   Reason for Transfer Level of Care needed not available at this facility   Benefits of Transfer Specialized equipment and/or services available at the receiving facility (Include comment)________________________   Risks of Transfer Potential for delay in receiving treatment, Potential deterioration of medical condition, Increased discomfort during transfer, Possible worsening of condition or death during transfer   Accepting Physician Dr Jimmie Perkins Name, Elise Spence    (Name & Tel number) Stephanie Higgins DO   Provider Certification Unanticipated needs of medical equipment and personnel during transport, Risk of worsening condition, The possibility of a transport vehicle being unavailable, General risk, such as traffic hazards, adverse weather conditions, rough terrain or turbulence, possible failure of equipment (including vehicle or aircraft), or consequences of actions of persons outside the control of the transport personnel          RN Documentation      Flowsheet Row Most [address] [place] [address] Name, [address] [place] [address]    (Name & Tel number) [facility]          Follow-up Information    None         Patient's Medications   Discharge Prescriptions    No medications on file       No discharge procedures on file.     PDMP Review         Value Time User    PDMP Reviewed  Yes 5/6/2022 10:38 AM Damaris Domingo MD            ED Provider  Electronically Signed by             Evangelist Higgins DO  11/02/23 2425

## 2023-11-02 NOTE — ED NOTES
Pt has a [employer] plan. Called Care Coordinator for [facility] and was unable to speak with them due to the offices being closed.

## 2023-11-03 ENCOUNTER — HOSPITAL ENCOUNTER (INPATIENT)
Facility: HOSPITAL | Age: 53
LOS: 13 days | Discharge: NURSING, REHAB OR LONG-TERM CARE | DRG: 885 | End: 2023-11-16
Attending: PSYCHIATRY & NEUROLOGY | Admitting: PSYCHIATRY & NEUROLOGY
Payer: MEDICARE

## 2023-11-03 DIAGNOSIS — L30.4 INTERTRIGO: ICD-10-CM

## 2023-11-03 DIAGNOSIS — M79.605 LEFT LEG PAIN: ICD-10-CM

## 2023-11-03 DIAGNOSIS — R42 DIZZINESS: ICD-10-CM

## 2023-11-03 DIAGNOSIS — R07.89 ATYPICAL CHEST PAIN: ICD-10-CM

## 2023-11-03 DIAGNOSIS — R56.9 SEIZURES (HCC): ICD-10-CM

## 2023-11-03 DIAGNOSIS — E55.9 VITAMIN D DEFICIENCY: ICD-10-CM

## 2023-11-03 DIAGNOSIS — G47.00 INSOMNIA: ICD-10-CM

## 2023-11-03 DIAGNOSIS — Z72.0 TOBACCO ABUSE: ICD-10-CM

## 2023-11-03 DIAGNOSIS — R45.851 SUICIDAL IDEATIONS: ICD-10-CM

## 2023-11-03 DIAGNOSIS — K59.00 CONSTIPATION: ICD-10-CM

## 2023-11-03 DIAGNOSIS — F43.10 PTSD (POST-TRAUMATIC STRESS DISORDER): ICD-10-CM

## 2023-11-03 DIAGNOSIS — L85.3 DRY SKIN: ICD-10-CM

## 2023-11-03 DIAGNOSIS — F32.3 MAJOR DEPRESSION WITH PSYCHOTIC FEATURES (HCC): Primary | ICD-10-CM

## 2023-11-03 PROBLEM — D64.9 ANEMIA: Status: ACTIVE | Noted: 2023-11-03

## 2023-11-03 LAB
ANION GAP SERPL CALCULATED.3IONS-SCNC: 6 MMOL/L
ATRIAL RATE: 87 BPM
BASOPHILS # BLD AUTO: 0.03 THOUSANDS/ÂΜL (ref 0–0.1)
BASOPHILS NFR BLD AUTO: 0 % (ref 0–1)
BUN SERPL-MCNC: 13 MG/DL (ref 5–25)
CALCIUM SERPL-MCNC: 9.2 MG/DL (ref 8.4–10.2)
CHLORIDE SERPL-SCNC: 97 MMOL/L (ref 96–108)
CHOLEST SERPL-MCNC: 212 MG/DL
CO2 SERPL-SCNC: 31 MMOL/L (ref 21–32)
CREAT SERPL-MCNC: 0.5 MG/DL (ref 0.6–1.3)
EOSINOPHIL # BLD AUTO: 0.21 THOUSAND/ÂΜL (ref 0–0.61)
EOSINOPHIL NFR BLD AUTO: 3 % (ref 0–6)
ERYTHROCYTE [DISTWIDTH] IN BLOOD BY AUTOMATED COUNT: 14.5 % (ref 11.6–15.1)
GFR SERPL CREATININE-BSD FRML MDRD: 111 ML/MIN/1.73SQ M
GLUCOSE SERPL-MCNC: 84 MG/DL (ref 65–140)
HCT VFR BLD AUTO: 36.5 % (ref 34.8–46.1)
HDLC SERPL-MCNC: 42 MG/DL
HGB BLD-MCNC: 11.9 G/DL (ref 11.5–15.4)
IMM GRANULOCYTES # BLD AUTO: 0.11 THOUSAND/UL (ref 0–0.2)
IMM GRANULOCYTES NFR BLD AUTO: 2 % (ref 0–2)
LDLC SERPL CALC-MCNC: 125 MG/DL (ref 0–100)
LYMPHOCYTES # BLD AUTO: 1.81 THOUSANDS/ÂΜL (ref 0.6–4.47)
LYMPHOCYTES NFR BLD AUTO: 25 % (ref 14–44)
MCH RBC QN AUTO: 31.2 PG (ref 26.8–34.3)
MCHC RBC AUTO-ENTMCNC: 32.6 G/DL (ref 31.4–37.4)
MCV RBC AUTO: 96 FL (ref 82–98)
MONOCYTES # BLD AUTO: 0.87 THOUSAND/ÂΜL (ref 0.17–1.22)
MONOCYTES NFR BLD AUTO: 12 % (ref 4–12)
NEUTROPHILS # BLD AUTO: 4.33 THOUSANDS/ÂΜL (ref 1.85–7.62)
NEUTS SEG NFR BLD AUTO: 58 % (ref 43–75)
NONHDLC SERPL-MCNC: 170 MG/DL
NRBC BLD AUTO-RTO: 0 /100 WBCS
P AXIS: 49 DEGREES
PLATELET # BLD AUTO: 214 THOUSANDS/UL (ref 149–390)
PMV BLD AUTO: 9.8 FL (ref 8.9–12.7)
POTASSIUM SERPL-SCNC: 4.2 MMOL/L (ref 3.5–5.3)
PR INTERVAL: 200 MS
QRS AXIS: 52 DEGREES
QRSD INTERVAL: 82 MS
QT INTERVAL: 364 MS
QTC INTERVAL: 438 MS
RBC # BLD AUTO: 3.82 MILLION/UL (ref 3.81–5.12)
SODIUM SERPL-SCNC: 134 MMOL/L (ref 135–147)
T WAVE AXIS: 55 DEGREES
TRIGL SERPL-MCNC: 223 MG/DL
VENTRICULAR RATE: 87 BPM
WBC # BLD AUTO: 7.36 THOUSAND/UL (ref 4.31–10.16)

## 2023-11-03 PROCEDURE — 82746 ASSAY OF FOLIC ACID SERUM: CPT | Performed by: PSYCHIATRY & NEUROLOGY

## 2023-11-03 PROCEDURE — 82306 VITAMIN D 25 HYDROXY: CPT | Performed by: PSYCHIATRY & NEUROLOGY

## 2023-11-03 PROCEDURE — 93010 ELECTROCARDIOGRAM REPORT: CPT | Performed by: INTERNAL MEDICINE

## 2023-11-03 PROCEDURE — 99223 1ST HOSP IP/OBS HIGH 75: CPT | Performed by: PSYCHIATRY & NEUROLOGY

## 2023-11-03 PROCEDURE — 85025 COMPLETE CBC W/AUTO DIFF WBC: CPT | Performed by: PSYCHIATRY & NEUROLOGY

## 2023-11-03 PROCEDURE — 80061 LIPID PANEL: CPT | Performed by: PSYCHIATRY & NEUROLOGY

## 2023-11-03 PROCEDURE — 82607 VITAMIN B-12: CPT | Performed by: PSYCHIATRY & NEUROLOGY

## 2023-11-03 PROCEDURE — 80048 BASIC METABOLIC PNL TOTAL CA: CPT | Performed by: PSYCHIATRY & NEUROLOGY

## 2023-11-03 RX ORDER — TRAZODONE HYDROCHLORIDE 100 MG/1
100 TABLET ORAL
Status: DISCONTINUED | OUTPATIENT
Start: 2023-11-03 | End: 2023-11-16 | Stop reason: HOSPADM

## 2023-11-03 RX ORDER — RISPERIDONE 0.25 MG/1
0.25 TABLET ORAL
Status: DISCONTINUED | OUTPATIENT
Start: 2023-11-03 | End: 2023-11-16 | Stop reason: HOSPADM

## 2023-11-03 RX ORDER — LORAZEPAM 2 MG/ML
1 INJECTION INTRAMUSCULAR
Status: DISCONTINUED | OUTPATIENT
Start: 2023-11-03 | End: 2023-11-16 | Stop reason: HOSPADM

## 2023-11-03 RX ORDER — NYSTATIN 100000 [USP'U]/G
POWDER TOPICAL 2 TIMES DAILY
Status: DISCONTINUED | OUTPATIENT
Start: 2023-11-03 | End: 2023-11-14

## 2023-11-03 RX ORDER — AMMONIUM LACTATE 12 G/100G
LOTION TOPICAL 2 TIMES DAILY
Status: DISCONTINUED | OUTPATIENT
Start: 2023-11-03 | End: 2023-11-16 | Stop reason: HOSPADM

## 2023-11-03 RX ORDER — DIVALPROEX SODIUM 500 MG/1
1000 TABLET, EXTENDED RELEASE ORAL
Status: DISCONTINUED | OUTPATIENT
Start: 2023-11-03 | End: 2023-11-16 | Stop reason: HOSPADM

## 2023-11-03 RX ORDER — ACETAMINOPHEN 325 MG/1
975 TABLET ORAL EVERY 6 HOURS PRN
Status: DISCONTINUED | OUTPATIENT
Start: 2023-11-03 | End: 2023-11-16 | Stop reason: HOSPADM

## 2023-11-03 RX ORDER — MECLIZINE HCL 12.5 MG/1
25 TABLET ORAL EVERY 8 HOURS PRN
Status: DISCONTINUED | OUTPATIENT
Start: 2023-11-03 | End: 2023-11-03

## 2023-11-03 RX ORDER — FLUOXETINE HYDROCHLORIDE 20 MG/1
40 CAPSULE ORAL DAILY
Status: DISCONTINUED | OUTPATIENT
Start: 2023-11-04 | End: 2023-11-07

## 2023-11-03 RX ORDER — AMOXICILLIN 250 MG
2 CAPSULE ORAL
Status: DISCONTINUED | OUTPATIENT
Start: 2023-11-03 | End: 2023-11-16 | Stop reason: HOSPADM

## 2023-11-03 RX ORDER — ACETAMINOPHEN 325 MG/1
650 TABLET ORAL EVERY 4 HOURS PRN
Status: DISCONTINUED | OUTPATIENT
Start: 2023-11-03 | End: 2023-11-16 | Stop reason: HOSPADM

## 2023-11-03 RX ORDER — MECLIZINE HCL 12.5 MG/1
25 TABLET ORAL 2 TIMES DAILY
Status: DISCONTINUED | OUTPATIENT
Start: 2023-11-03 | End: 2023-11-16 | Stop reason: HOSPADM

## 2023-11-03 RX ORDER — BENZTROPINE MESYLATE 1 MG/1
1 TABLET ORAL 2 TIMES DAILY PRN
Status: DISCONTINUED | OUTPATIENT
Start: 2023-11-03 | End: 2023-11-16 | Stop reason: HOSPADM

## 2023-11-03 RX ORDER — RISPERIDONE 1 MG/1
1 TABLET ORAL
Status: DISCONTINUED | OUTPATIENT
Start: 2023-11-03 | End: 2023-11-16 | Stop reason: HOSPADM

## 2023-11-03 RX ORDER — RISPERIDONE 2 MG/1
2 TABLET ORAL EVERY 12 HOURS
Status: DISCONTINUED | OUTPATIENT
Start: 2023-11-03 | End: 2023-11-05

## 2023-11-03 RX ORDER — HYDROXYZINE HYDROCHLORIDE 25 MG/1
25 TABLET, FILM COATED ORAL
Status: DISCONTINUED | OUTPATIENT
Start: 2023-11-03 | End: 2023-11-03

## 2023-11-03 RX ORDER — ACETAMINOPHEN 325 MG/1
650 TABLET ORAL EVERY 6 HOURS PRN
Status: DISCONTINUED | OUTPATIENT
Start: 2023-11-03 | End: 2023-11-16 | Stop reason: HOSPADM

## 2023-11-03 RX ORDER — PRAZOSIN HYDROCHLORIDE 1 MG/1
2 CAPSULE ORAL
Status: DISCONTINUED | OUTPATIENT
Start: 2023-11-03 | End: 2023-11-16 | Stop reason: HOSPADM

## 2023-11-03 RX ORDER — LORAZEPAM 1 MG/1
1 TABLET ORAL EVERY 8 HOURS PRN
Status: DISCONTINUED | OUTPATIENT
Start: 2023-11-03 | End: 2023-11-16 | Stop reason: HOSPADM

## 2023-11-03 RX ORDER — HYDROXYZINE 50 MG/1
50 TABLET, FILM COATED ORAL
Status: DISCONTINUED | OUTPATIENT
Start: 2023-11-03 | End: 2023-11-16 | Stop reason: HOSPADM

## 2023-11-03 RX ORDER — LANOLIN ALCOHOL/MO/W.PET/CERES
3 CREAM (GRAM) TOPICAL
Status: DISCONTINUED | OUTPATIENT
Start: 2023-11-03 | End: 2023-11-16 | Stop reason: HOSPADM

## 2023-11-03 RX ORDER — MAGNESIUM HYDROXIDE/ALUMINUM HYDROXICE/SIMETHICONE 120; 1200; 1200 MG/30ML; MG/30ML; MG/30ML
30 SUSPENSION ORAL EVERY 4 HOURS PRN
Status: DISCONTINUED | OUTPATIENT
Start: 2023-11-03 | End: 2023-11-16 | Stop reason: HOSPADM

## 2023-11-03 RX ORDER — CEPHALEXIN 250 MG/1
500 CAPSULE ORAL EVERY 6 HOURS SCHEDULED
Status: DISCONTINUED | OUTPATIENT
Start: 2023-11-03 | End: 2023-11-08

## 2023-11-03 RX ORDER — RISPERIDONE 0.5 MG/1
0.5 TABLET ORAL
Status: DISCONTINUED | OUTPATIENT
Start: 2023-11-03 | End: 2023-11-04

## 2023-11-03 RX ORDER — POLYETHYLENE GLYCOL 3350 17 G/17G
17 POWDER, FOR SOLUTION ORAL DAILY PRN
Status: DISCONTINUED | OUTPATIENT
Start: 2023-11-03 | End: 2023-11-10

## 2023-11-03 RX ORDER — HALOPERIDOL 5 MG/ML
5 INJECTION INTRAMUSCULAR
Status: DISCONTINUED | OUTPATIENT
Start: 2023-11-03 | End: 2023-11-16 | Stop reason: HOSPADM

## 2023-11-03 RX ORDER — MUSCLE RUB CREAM 100; 150 MG/G; MG/G
CREAM TOPICAL 4 TIMES DAILY PRN
Status: DISCONTINUED | OUTPATIENT
Start: 2023-11-03 | End: 2023-11-06

## 2023-11-03 RX ORDER — PANTOPRAZOLE SODIUM 20 MG/1
20 TABLET, DELAYED RELEASE ORAL
Status: DISCONTINUED | OUTPATIENT
Start: 2023-11-04 | End: 2023-11-16 | Stop reason: HOSPADM

## 2023-11-03 RX ADMIN — Medication 3 MG: at 21:52

## 2023-11-03 RX ADMIN — VENLAFAXINE HYDROCHLORIDE 37.5 MG: 37.5 CAPSULE, EXTENDED RELEASE ORAL at 10:23

## 2023-11-03 RX ADMIN — CEPHALEXIN 500 MG: 250 CAPSULE ORAL at 17:16

## 2023-11-03 RX ADMIN — TRAZODONE HYDROCHLORIDE 100 MG: 100 TABLET ORAL at 21:52

## 2023-11-03 RX ADMIN — DIVALPROEX SODIUM 1000 MG: 500 TABLET, FILM COATED, EXTENDED RELEASE ORAL at 21:52

## 2023-11-03 RX ADMIN — RISPERIDONE 2 MG: 2 TABLET ORAL at 21:52

## 2023-11-03 RX ADMIN — CEPHALEXIN 500 MG: 250 CAPSULE ORAL at 10:23

## 2023-11-03 RX ADMIN — MECLIZINE 25 MG: 12.5 TABLET ORAL at 17:16

## 2023-11-03 RX ADMIN — FLUOXETINE 40 MG: 10 CAPSULE ORAL at 10:23

## 2023-11-03 RX ADMIN — PRAZOSIN HYDROCHLORIDE 2 MG: 1 CAPSULE ORAL at 21:52

## 2023-11-03 RX ADMIN — CEPHALEXIN 500 MG: 250 CAPSULE ORAL at 21:53

## 2023-11-03 RX ADMIN — PANTOPRAZOLE SODIUM 20 MG: 20 TABLET, DELAYED RELEASE ORAL at 10:24

## 2023-11-03 RX ADMIN — RISPERIDONE 2 MG: 2 TABLET ORAL at 10:24

## 2023-11-03 RX ADMIN — Medication 1 APPLICATION: at 21:55

## 2023-11-03 NOTE — ED NOTES
Patient is accepted at [facility]  Patient is accepted by Ashlee Torres per [address] [facility] [address] is arranged with [facility]. Transportation is scheduled for 11/03/23   Patient may go to the floor at 1000. Nurse report is to be called to [phone] prior to patient transfer.

## 2023-11-03 NOTE — NURSING NOTE
Upon arrival to the unit pt is bright and pleasant. She reports that she lives at [facility]. She states that her anxiety and depression are very high and she has recently had an increase in auditory hallucinations of voices telling her bad things about herself. She states that she has been feeling she does not want to live any more and feels suicidal but does not have a plan. She reports feeling safe here on the unit. She states that she has been compliant with all of her prescribed medications but feels they are not effective any more- typically when she takes her medications the voices go away 100%. She has been ambulatory without difficulty. She does require x's 1 assist in the shower but is otherwise independent. No glasses, hearing aids, or dentures. Does have an allergy to fish. She has been oriented to the unit. Plan of care initiated. Q7 minute safety checks initiated.

## 2023-11-03 NOTE — NURSING NOTE
Skin intact. Scratch from SIB on left forearm that extends the length of left forearm. Slight redness noted under abdominal fold. George Aburto PA-C informed and Nystatin powder ordered.

## 2023-11-03 NOTE — PROGRESS NOTES
11/03/23 1320   Activity/Group Checklist   Group Admission/Discharge   Attendance Attended   Attendance Duration (min) 16-30   Interactions Interacted appropriately   Affect/Mood Appropriate   Goals Achieved Identified feelings; Identified triggers; Identified relapse prevention strategies; Discussed coping strategies; Identified resources and support systems; Able to listen to others; Able to engage in interactions; Able to reflect/comment on own behavior;Able to manage/cope with feelings; Able to self-disclose; Able to recieve feedback     Patient agreeable to meet and complete self assessment and relapse prevention plan with [facility]. Patient open to share she is here due to [facility] [employer] telling her to self harm; patient has depression, anxiety self harm due to [facility] [employer] and thoughts of suicide.

## 2023-11-03 NOTE — SOCIAL WORK
CM placed call to [address] [place] [address] to notify of PT admission. [phone] spoke with [facility] and notified of PT admission, she will update team. Confirmed fax number. Call ended mutually. ROSE placed call to [phone] [facility]. Spoke with Josr Molina whom will update team. Call ended mutually. ROSE placed call to [facility]  to notify of PT admission. Spoke with Mike Iqbal and transferred to care coordinator Lilian Burton and updated her on PT status and plan of care. PT became upset over money nothing our of norm per regi, compliant with medications. PT may return. [address] [facility] end services on [address] [facility] [place] [address] [place]/Phone: [phone]. Confirmed facility fax is [phone]. Call ended mutually.

## 2023-11-03 NOTE — EMTALA/ACUTE CARE TRANSFER
[facility] EMERGENCY DEPARTMENT  [address]  [facility] [address]  Dept: [phone]      EMTALA TRANSFER CONSENT    NAME Leydi Muro                                         1970                              MRN [id number]    I have been informed of my rights regarding examination, treatment, and transfer   by Dr. Wendi Robert DO    Benefits: Specialized equipment and/or services available at the receiving facility (Include comment)________________________    Risks: Potential for delay in receiving treatment, Potential deterioration of medical condition, Increased discomfort during transfer, Possible worsening of condition or death during transfer      Consent for Transfer:  I acknowledge that my medical condition has been evaluated and explained to me by the emergency department physician or other qualified medical person and/or my attending physician, who has recommended that I be transferred to the service of  Accepting Physician: Dr Horacio Bergman at [address] [place] [address] [address] Name, [address] : [place], [place]. The above potential benefits of such transfer, the potential risks associated with such transfer, and the probable risks of not being transferred have been explained to me, and I fully understand them. The doctor has explained that, in my case, the benefits of transfer outweigh the risks. I agree to be transferred. I authorize the performance of emergency medical procedures and treatments upon me in both transit and upon arrival at the receiving facility. Additionally, I authorize the release of any and all medical records to the receiving facility and request they be transported with me, if possible. I understand that the safest mode of transportation during a medical emergency is an ambulance and that the Hospital advocates the use of this mode of transport.  Risks of traveling to the receiving facility by car, including absence of medical control, life sustaining equipment, such as oxygen, and medical personnel has been explained to me and I fully understand them. (PRAKASH CORRECT BOX BELOW)  [  ]  I consent to the stated transfer and to be transported by ambulance/helicopter. [  ]  I consent to the stated transfer, but refuse transportation by ambulance and accept full responsibility for my transportation by car. I understand the risks of non-ambulance transfers and I exonerate the Hospital and its staff from any deterioration in my condition that results from this refusal.    X___________________________________________    DATE  23  TIME________  Signature of patient or legally responsible individual signing on patient behalf           RELATIONSHIP TO PATIENT_________________________          Provider Certification    NAME Leydi Lala                                         1970                              MRN [id number]    A medical screening exam was performed on the above named patient. Based on the examination:    Condition Necessitating Transfer The primary encounter diagnosis was Suicidal ideations. A diagnosis of Depression was also pertinent to this visit.     Patient Condition: The patient has been stabilized such that within reasonable medical probability, no material deterioration of the patient condition or the condition of the unborn child(courtney) is likely to result from the transfer    Reason for Transfer: Level of Care needed not available at this facility    Transfer Requirements: Facility [place], [place]   Space available and qualified personnel available for treatment as acknowledged by Elza  Agreed to accept transfer and to provide appropriate medical treatment as acknowledged by       Dr Iron Plascencia  Appropriate medical records of the examination and treatment of the patient are provided at the time of transfer   [address] _______  Transfer will be performed by qualified personnel from    and appropriate transfer equipment as required, including the use of necessary and appropriate life support measures. Provider Certification: I have examined the patient and explained the following risks and benefits of being transferred/refusing transfer to the patient/family:  Unanticipated needs of medical equipment and personnel during transport, Risk of worsening condition, The possibility of a transport vehicle being unavailable, General risk, such as traffic hazards, adverse weather conditions, rough terrain or turbulence, possible failure of equipment (including vehicle or aircraft), or consequences of actions of persons outside the control of the transport personnel      Based on these reasonable risks and benefits to the patient and/or the unborn child(courtney), and based upon the information available at the time of the patient’s examination, I certify that the medical benefits reasonably to be expected from the provision of appropriate medical treatments at another medical facility outweigh the increasing risks, if any, to the individual’s medical condition, and in the case of labor to the unborn child, from effecting the transfer.     X____________________________________________ DATE 11/02/23        TIME_______      ORIGINAL - SEND TO MEDICAL RECORDS   COPY - SEND WITH PATIENT DURING TRANSFER

## 2023-11-03 NOTE — PROGRESS NOTES
11/03/23 1330   Team Meeting   Meeting Type Tx Team Meeting   Team Members Present   Team Members Present Physician;Nurse;   Physician Team Member Dr. Tiffani Mckeon MD   Nursing Team Member Joshua Arnett, JOSÉ MIGUEL   Care Management Team Member Aliya Leigh, MS, [facility], [facility]   Patient/Family Present   Patient Present Yes   Patient's Family Present No     PT met with [facility] team, reviewed [facility] plan and goals all in agreement and signed.

## 2023-11-03 NOTE — TREATMENT PLAN
TREATMENT PLAN REVIEW - [address] [facility] Aron Cook 47 y.o. 1970 female MRN: [id number]    [address] [address] Room / Bed: Kell Wilks/OABHU 597-07 Encounter: [id number]          Admit Date/Time:  11/3/2023 11:49 AM    Treatment Team: Attending Provider: Yola Ayala MD; : Joanie Sagastume MS; Registered Nurse: Waldo Newby RN; Recreational Therapist: Brandon Lind    Diagnosis: Principal Problem:    Major depression with psychotic features [facility])  Active Problems:    Primary hypertension    Hyperlipidemia    Class 3 severe obesity due to excess calories without serious comorbidity in adult [facility])    Chronic midline low back pain without sciatica    PTSD (post-traumatic stress disorder)    Vertigo    Seizures ([address] )    Insomnia    Anemia      Patient Strengths/Assets: cooperative, negotiates basic needs, patient is on a voluntary commitment    Patient Barriers/Limitations: limited family ties, poor physical health, self-care deficit    Short Term Goals: decrease in depressive symptoms, decrease in anxiety symptoms, decrease in psychotic symptoms, decrease in suicidal thoughts, ability to stay safe on the unit, improvement in reasoning ability, improvement in self care, sleep improvement, improvement in appetite, mood stabilization, increase in group attendance, increase in socialization with peers on the unit, acceptance of need for psychiatric treatment, acceptance of psychiatric medications    Long Term Goals: improvement in depression, improvement in anxiety, stabilization of mood, free of suicidal thoughts, improvement in reasoning ability, improved insight, able to express basic needs, acceptance of need for psychiatric medications, acceptance of need for psychiatric treatment, adequate self care, adequate sleep, adequate appetite, adequate oral intake, appropriate interaction with peers, stable living arrangements upon discharge    Progress Towards Goals: starting psychiatric medications as prescribed    Recommended Treatment: medication management, patient medication education, group therapy, milieu therapy, continued Behavioral Health psychiatric evaluation/assessment process, medical follow up with medical team    Treatment Frequency: daily medication monitoring, group and milieu therapy daily, monitoring through interdisciplinary rounds, monitoring through weekly patient care conferences    Expected Discharge Date: 10 midnights     Discharge Plan: will be determined    Treatment Plan Created/Updated By: Crystal Domingo MD

## 2023-11-03 NOTE — PROGRESS NOTES
Lori Antonio  [facility]:[birth year] F  YVO:[id number]    AGE:[id number]  Adm Date: 11/3/2023 1149  11:49 AM   ATT PHY: Gill Lofton, [address] [facility]         Chief Complaint: depression, suicidal ideations, auditory hallucinations. History of Presenting Illness: Lori Antonio is a(n) 47y.o. year old female who is admitted to [address] [place] [address] on voluntary 201 committment basis. Patient originally presented to [address] [place] [address] ED on 11/2/2023 for depression, suicidal ideations, auditory hallucinations. Patient examined at bedside. Patient currently complains of dizziness due to vertigo. She states taking meclizine with improvement. While in the ED patient was started on keflex for UTI. Currently, she denies any urinary symptoms. Denies dysuria, urgency, frequency, flank pain. Denies chest pain, shortness of breath, nausea, vomiting, headache.     Allergies   Allergen Reactions    Fish Oil - Food Allergy Other (See Comments)     unknown    Fish-Derived Products - Food Allergy Hives     Current Facility-Administered Medications on File Prior to Encounter   Medication Dose Route Frequency Provider Last Rate Last Admin    [DISCONTINUED] acetaminophen (TYLENOL) tablet 650 mg  650 mg Oral Q6H PRN Raphael Champagnes, DO        [DISCONTINUED] benztropine (COGENTIN) tablet 1 mg  1 mg Oral Daily PRN Raphael Champagnes, DO        [DISCONTINUED] cephalexin (KEFLEX) capsule 500 mg  500 mg Oral [facility] Fatuam Arteaga, DO   500 mg at 11/03/23 1023    [DISCONTINUED] divalproex sodium (DEPAKOTE) DR tablet 1,000 mg  1,000 mg Oral HS Raphael Champagnes, DO   1,000 mg at 11/02/23 2145    [DISCONTINUED] FLUoxetine (PROzac) capsule 40 mg  40 mg Oral Daily Raphael Jessica, DO   40 mg at 11/03/23 1023    [DISCONTINUED] fluticasone (FLONASE) 50 mcg/act nasal spray 1 spray  1 spray Each Nare Daily PRN Raphael Champagnes, DO        [DISCONTINUED] loratadine (CLARITIN) tablet 10 mg  10 mg Oral Daily PRN Lincoln Pi, DO        [DISCONTINUED] LORazepam (ATIVAN) tablet 1 mg  1 mg Oral BID PRN Lincoln Pi, DO        [DISCONTINUED] meclizine (ANTIVERT) tablet 25 mg  25 mg Oral Q12H PRN Linda Pi, DO        [DISCONTINUED] pantoprazole (PROTONIX) EC tablet 20 mg  20 mg Oral Early Morning Lincoln Pi, DO   20 mg at 11/03/23 1024    [DISCONTINUED] prazosin (MINIPRESS) capsule 2 mg  2 mg Oral HS Linda Pi, DO   2 mg at 11/02/23 2145    [DISCONTINUED] risperiDONE (RisperDAL) tablet 2 mg  2 mg Oral BID Lincoln Pi, DO   2 mg at 11/03/23 1024    [DISCONTINUED] senna-docusate sodium (SENOKOT S) 8.6-50 mg per tablet 2 tablet  2 tablet Oral HS Lincoln Pi, DO        [DISCONTINUED] traZODone (DESYREL) tablet 100 mg  100 mg Oral HS Linda Pi, DO   100 mg at 11/02/23 2144    [DISCONTINUED] venlafaxine (EFFEXOR-XR) 24 hr capsule 37.5 mg  37.5 mg Oral Daily Linda Pi, DO   37.5 mg at 11/03/23 1023     Current Outpatient Medications on File Prior to Encounter   Medication Sig Dispense Refill    cephalexin (KEFLEX) 500 mg capsule Take 500 mg by mouth every 6 (six) hours 10:00 AM, 1:00 PM, 4:00 PM      divalproex sodium (DEPAKOTE ER) 500 mg 24 hr tablet Take 4 tablets (2,000 mg total) by mouth daily at bedtime (Patient taking differently: Take 1,000 mg by mouth daily at bedtime Patient uses this medication to prevent seizures) 120 tablet 0    FLUoxetine (PROzac) 40 MG capsule Take 1 capsule (40 mg total) by mouth daily Do not start before December 20, 2022. 30 capsule 0    meclizine (ANTIVERT) 25 mg tablet Take 25 mg by mouth 2 (two) times a day      pantoprazole (PROTONIX) 20 mg tablet Take 1 tablet (20 mg total) by mouth daily 20 tablet 0    prazosin (MINIPRESS) 2 mg capsule Take 1 capsule (2 mg total) by mouth daily at bedtime 30 capsule 0    risperiDONE (RisperDAL M-TAB) 2 mg disintegrating tablet Take 2 mg by mouth every 12 (twelve) hours      senna-docusate sodium (SENOKOT S) 8.6-50 mg per tablet Take 1 tablet by mouth 2 (two) times a day (Patient taking differently: Take 2 tablets by mouth daily at bedtime) 60 tablet 0    traZODone (DESYREL) 100 mg tablet Take 1 tablet (100 mg total) by mouth daily at bedtime 30 tablet 0    venlafaxine (EFFEXOR-XR) 37.5 mg 24 hr capsule Take 37.5 mg by mouth daily      acetaminophen (TYLENOL) 80 mg chewable tablet Chew 500 mg every 6 (six) hours as needed for mild pain      aspirin 81 mg chewable tablet Chew 1 tablet (81 mg total) daily (Patient not taking: Reported on 11/3/2023) 30 tablet 0    atorvastatin (LIPITOR) 40 mg tablet Take 1 tablet (40 mg total) by mouth daily with dinner (Patient not taking: Reported on 11/3/2023) 30 tablet 0    benztropine (COGENTIN) 1 mg tablet Take 1 mg by mouth 2 (two) times a day as needed for tremors (EPS Symptoms)      cholecalciferol (VITAMIN D3) 400 units tablet Take 1 tablet (400 Units total) by mouth daily (Patient not taking: Reported on 11/3/2023) 30 tablet 0    Cyanocobalamin (Vitamin B 12) 500 MCG TABS Take 1,000 mcg by mouth in the morning (Patient not taking: Reported on 11/3/2023) 30 tablet 0    fluticasone (FLONASE) 50 mcg/act nasal spray 1 spray into each nostril daily Do not start before December 20, 2022. 74.4 mL 0    folic acid (FOLVITE) 1 mg tablet Take 1 tablet (1 mg total) by mouth daily (Patient not taking: Reported on 11/3/2023) 30 tablet 0    haloperidol (HALDOL) 10 mg tablet Take 1 tablet (10 mg total) by mouth 2 (two) times a day (Patient not taking: Reported on 11/3/2023) 60 tablet 0    lisinopril (ZESTRIL) 20 mg tablet Take 1 tablet (20 mg total) by mouth daily (Patient not taking: Reported on 11/3/2023) 30 tablet 0    LORazepam (ATIVAN) 1 mg tablet Take 1 mg by mouth every 8 (eight) hours as needed for anxiety      mirtazapine (REMERON) 45 MG tablet Take 1 tablet (45 mg total) by mouth daily at bedtime (Patient not taking: Reported on 11/3/2023) 30 tablet 0    ondansetron (Zofran ODT) 4 mg disintegrating tablet Take 1 tablet (4 mg total) by mouth every 6 (six) hours as needed for nausea or vomiting (Patient not taking: Reported on 2/26/2023) 20 tablet 0    ziprasidone (GEODON) 40 mg capsule Take 1 capsule (40 mg total) by mouth 2 (two) times a day with meals (Patient not taking: Reported on 11/3/2023) 60 capsule 0    [DISCONTINUED] senna-docusate sodium (SENOKOT-S) 8.6-50 mg per tablet Take 1 tablet by mouth daily       Active Ambulatory Problems     Diagnosis Date Noted    Primary hypertension 08/09/2016    Hyperlipidemia 08/09/2016    Major depression with psychotic features ([address]) 11/29/2020    Class 3 severe obesity due to excess calories without serious comorbidity in adult ([address]) 01/09/2021    Chronic midline low back pain without sciatica 01/09/2021    PTSD (post-traumatic stress disorder) 01/09/2021    Vertigo 01/22/2021    Closed fracture of base of fifth metatarsal bone 11/14/2021    Borderline personality disorder ([address])     Left leg pain 12/18/2021    Seizures ([address]) 04/27/2022    Hyponatremia 04/27/2022    Insomnia 05/06/2022    History of non-suicidal self-harm 07/29/2022     Resolved Ambulatory Problems     Diagnosis Date Noted    Medical clearance for psychiatric admission 01/09/2021    Bacterial conjunctivitis of both eyes 01/09/2021    Bipolar affective disorder, depressed, severe, with psychotic behavior ([address]) 10/10/2021    Seizures ([address]) 12/06/2021    Medical clearance for psychiatric admission 04/27/2022    Constipation 05/06/2022    Acute cystitis without hematuria 10/07/2022     Past Medical History:   Diagnosis Date    Anxiety     Bipolar 1 disorder (HCC)     Bipolar affective disorder, depressed, severe ([address]) 10/10/2021    CAD (coronary artery disease)     Cognitive impairment     Depression     Dyslipidemia     GERD (gastroesophageal reflux disease)     Hypertension     Obesity     Psychiatric disorder     Psychiatric illness     Schizoaffective disorder ([address])     Seizure ([address])      Past Surgical History:   Procedure Laterality Date    APPENDECTOMY      PATIENT DENIES HAVING APPENDIX REMOVED    CHOLECYSTECTOMY      FOOT SURGERY Right      Social History:   Social History     Socioeconomic History    Marital status: Single     Spouse name: Not on file    Number of children: Not on file    Years of education: Not on file    Highest education level: Not on file   Occupational History    Not on file   Tobacco Use    Smoking status: Former    Smokeless tobacco: Never    Tobacco comments:     Pt stated "smokes when stressed"   Vaping Use    Vaping Use: Never used   Substance and Sexual Activity    Alcohol use: Yes     Comment: occasionally    Drug use: Not Currently    Sexual activity: Not on file   Other Topics Concern    Not on file   Social History Narrative    Not on file     Social Determinants of Health     Financial Resource Strain: Not on file   Food Insecurity: Not on file   Transportation Needs: Not on file   Physical Activity: Not on file   Stress: Not on file   Social Connections: Not on file   Intimate Partner Violence: Not on file   Housing Stability: Not on file     Family History:   Family History   Problem Relation Age of Onset    Kidney cancer Mother     Cerebral aneurysm Father      Review of Systems   Constitutional:  Negative for chills and fever. HENT:  Negative for ear pain and sore throat. Eyes:  Negative for pain and visual disturbance. Respiratory:  Negative for cough and shortness of breath. Cardiovascular:  Negative for chest pain and palpitations. Gastrointestinal:  Negative for abdominal pain, constipation, diarrhea, nausea and vomiting. Genitourinary:  Negative for difficulty urinating, dysuria, flank pain, frequency and hematuria. Musculoskeletal:  Positive for arthralgias. Negative for back pain. Skin:  Negative for color change and rash. Neurological:  Positive for dizziness (hx vertigo) and seizures. Negative for headaches.    Psychiatric/Behavioral:  Positive for dysphoric mood, hallucinations and suicidal ideas. All other systems reviewed and are negative. Physical Exam   Vitals: Blood pressure 135/76, pulse 80, temperature 97.6 °F (36.4 °C), temperature source Temporal, resp. rate 16, weight 117 kg (258 lb), SpO2 98 %. ,Body mass index is 41.64 kg/m². Constitutional: Awake, alert, in no acute distress. Head: Normocephalic and atraumatic. Mouth/Throat: Oropharynx is clear and moist.    Eyes: Conjunctivae and EOM are normal.   Neck: Neck supple. Cardiovascular: Normal rate, regular rhythm. Pulmonary/Chest: Effort normal and breath sounds normal.   Abdominal: Soft. Bowel sounds are normal.  There is no tenderness. Neurological: No focal deficits. Musculoskeletal:  Nontender spine. Skin: Skin is warm and dry. No edema. Dry skin to feet bilaterally. Callus noted to lateral aspect of right foot. Elijah Simmons is a(n) 47 y.o. female with MDD. 1.  Cardiac with hx of HTN, HLD. Continue Prazosin 2 mg nightly. Continue to monitor. 2.  GERD. Continue Protonix 20 mg daily. 3.  Constipation. Continue Senokot S 2 tablets daily at bedtime. 4.  Seizure disorder. Seizure precautions. Patient is on Depakote 1000 mg nightly. Reports last seizure was over 10 years ago. 5.  DJD/OA. Tylenol as needed. 6.  Dry skin. Apply ammonium lactate to bilateral feet twice daily. 7.  Vertigo. Continue meclizine 25 mg twice daily. 8.  Intertrigo. Apply nystatin powder twice daily. 9.  Acute UTI. Patient will be continued on Keflex 500 mg q6h x 5 days. 10.  Psych with MDD. This is being managed by the psych team.     Prognosis: Fair. Discharge Plan: In progress. Advanced Directives: I have discussed in detail the patient the advanced directives. Patient has not appointed anybody as her POA and has no living will with advanced healthcare directives. Patient's 1st contact is her aunt Justina Byrne and her phone number is [phone].  When discussing cardiac and pulmonary resuscitation efforts with the patient, the patient wishes to be FULL CODE. I have spent more than 50 minutes gathering data, doing physical examination, and discussing the advanced directives, which was witnessed by caring staff. The patient was discussed with Dr. Dante Burnett and he is in agreement with the above note.

## 2023-11-03 NOTE — PROGRESS NOTES
Patient came in with the following belongings:    Black socks  White underwear  Bra  Black leggings  Red,black and white long sleeve    LOCKED:  Green/white jacket  Hair brush  White sneakers with laces  . 90 cents    Patient signed and agreed to belongings

## 2023-11-03 NOTE — H&P
Psychiatric Evaluation - Behavioral Health     Identification Data:Leydi Taylor 47 y.o. female MRN: [id number]  Unit/Bed#: [facility] 201-02 Encounter: [id number]    Chief Complaint: depression, anxiety, suicidal ideation, and auditory hallucinations    History of Present Illness     Leydi Taylor is a 47 y.o. female with a history of depression versus bipolar disorder, PTSD , anxiety who was admitted to the inpatient older adult psychiatric unit on a voluntary 201 commitment basis due to depression, anxiety, unstable mood, auditory hallucinations, and suicidal ideation. Patient was brought to ED by [address] [place] staff member due to increased depressive symptoms, auditory hallucinations with suicidal thoughts. She reported +AH telling her to walk in front of the car. UDS was negative, EKG with no acute changes. Hb 10.9  On evaluation in the inpatient psychiatric unit Leydi presents mildly anxious, limited historian but cooperative and able to engage in appropriate interview. Patient states that she has been increasingly depressed, feeling hopeless and worthless, having bad thoughts about her self-image  with worsening of SI. She admits to increased paranoia and hearing voices telling her to walk in the street and get hit by a car to end her life. Denies any active plan or intent to hurt herself and she is able to contract for safety presently. Denies any current homicidal, delusion or manic symptoms but admits feeling frustrated related to "not getting the money" at her living setting. Patient admits to multiple psych admissions and suicide attempt in the past by overdose, cutting self or scratching her arm. She does have superficial laceration on her left forearm. Patient denies any recent alcohol or illicit drug use. She agreed to be compliant with medication and treatment plan in the unit.     Psychiatric Review Of Systems:    Sleep changes: decreased  Appetite changes:no  Weight changes: no  Energy: decreased  Interest/pleasure/: no  Anhedonia: no  Anxiety: yes  Fabienne: history of mood swings  Guilt:  no  Hopeless:  yes  Self injurious behavior/risky behavior: not recently  Suicidal ideation: yes, no plan  Homicidal ideation: no  Auditory hallucinations: yes, auditory hallucinations  Visual hallucinations: no  Delusional thinking: paranoid thoughts  Eating disorder history: no  Obsessive/compulsive symptoms: no    Historical Information     Past Psychiatric History:     Past Inpatient Psychiatric Treatment:   Multiple past inpatient psychiatric admissions  Past Outpatient Psychiatric Treatment:    Currently in outpatient psychiatric treatment with a psychiatrist  Past Suicide Attempts: yes, by overdose and cutting self  Past Violent Behavior: denies  Past Psychiatric Medication Trials: multiple psychiatric medication trials     Substance Abuse History:    Social History       Tobacco History       Smoking Status  Former      Smokeless Tobacco Use  Never      Tobacco Comments  Pt stated "smokes when stressed"              Alcohol History       Alcohol Use Status  Yes Comment  occasionally              Drug Use       Drug Use Status  Not Currently              Sexual Activity       Sexually Active  Not Asked              Activities of Daily Living    Not Asked                 Additional Substance Use Detail       Questions Responses    Problems Due to Past Use of Alcohol? No    Problems Due to Past Use of Substances?  No    Substance Use Assessment Denies substance use within the past 12 months    Alcohol Use Frequency Denies use in past 12 months    Cannabis frequency Never used    Comment: Never used on 1/9/2021     Heroin Frequency Denies use in past 12 months    Cocaine frequency Never used    Comment: Never used on 1/9/2021     Crack Cocaine Frequency Denies use in past 12 months    Methamphetamine Frequency Denies use in past 12 months    Narcotic Frequency Denies use in past 12 months    Benzodiazepine Frequency Denies use in past 12 months    Amphetamine frequency Denies use in past 12 months    Barbituate Frequency Denies use use in past 12 months    Inhalant frequency Never used    Comment: Never used on 1/9/2021     Hallucinogen frequency Never used    Comment: Never used on 1/9/2021     Ecstasy frequency Never used    Comment: Never used on 1/9/2021     Other drug frequency Never used    Comment: Never used on 1/9/2021     Opiate frequency Denies use in past 12 months    Not reviewed. I have assessed this patient for substance use within the past 12 months    Alcohol use: denies use  Recreational drug use: denies    Family Psychiatric History: Denies    Social History:    Education: high school diploma/GED, special education  Marital History: single  Children: none  Living Arrangement: lives in a group home  Occupational History: on permanent disability  Functioning Relationships: good support system  Legal History: none   History: None    Traumatic History:   Yes    Past Medical History:      Past Medical History:   Diagnosis Date    Anxiety     Bipolar 1 disorder ([address])     Bipolar affective disorder, depressed, severe ([address]) 10/10/2021    Borderline personality disorder ([address])     CAD (coronary artery disease)     Cognitive impairment     Depression     Dyslipidemia     GERD (gastroesophageal reflux disease)     Hypertension     Obesity     Psychiatric disorder     Psychiatric illness     PTSD (post-traumatic stress disorder)     Schizoaffective disorder ([address])     Seizure ([address])      Past Surgical History:   Procedure Laterality Date    APPENDECTOMY      PATIENT DENIES HAVING APPENDIX REMOVED    CHOLECYSTECTOMY      FOOT SURGERY Right        Medical Review Of Systems:    Pertinent items are noted in HPI. Allergies: Allergies   Allergen Reactions    Fish Oil - Food Allergy Other (See Comments)     unknown    Fish-Derived Products - Food Allergy Hives       Medications:      All current active medications have been reviewed. OBJECTIVE:    Vital signs in last 24 hours:    Temp:  [97.6 °F (36.4 °C)-98 °F (36.7 °C)] 97.6 °F (36.4 °C)  HR:  [80-89] 80  Resp:  [16-20] 16  BP: (134-139)/(67-76) 135/76    No intake or output data in the 24 hours ending 11/03/23 1403     Mental Status Evaluation:    Appearance:  age appropriate, overweight   Behavior:  cooperative   Speech:  normal rate and volume   Mood:  depressed, anxious   Affect:  tearful   Language: naming objects   Thought Process:  goal directed, concrete   Associations: concrete associations   Thought Content:  mild paranoia   Perceptual Disturbances: auditory hallucinations with commands to harm self   Risk Potential: Suicidal ideation - Yes, without plan  Homicidal ideation - None  Potential for aggression - Not at present   Sensorium:  oriented to person, place, and time/date   Memory:  recent and remote memory grossly intact   Consciousness:  alert and awake   Attention: attention span and concentration are age appropriate   Intellect: below average   Fund of Knowledge: awareness of current events: limited   Insight:  limited   Judgment: limited   Muscle Strength Muscle Tone: normal  normal   Gait/Station: normal gait/station   Motor Activity: no abnormal movements       Laboratory Results:   I have personally reviewed all pertinent laboratory/tests results.   Most Recent Labs:   Lab Results   Component Value Date    WBC 6.88 11/02/2023    RBC 3.54 (L) 11/02/2023    HGB 10.9 (L) 11/02/2023    HCT 33.5 (L) 11/02/2023     11/02/2023    RDW 14.4 11/02/2023    TOTANEUTABS 5.83 12/13/2022    NEUTROABS 3.77 11/02/2023    SODIUM 136 11/02/2023    K 3.9 11/02/2023     11/02/2023    CO2 30 11/02/2023    BUN 12 11/02/2023    CREATININE 0.61 11/02/2023    GLUC 107 11/02/2023    GLUF 130 (H) 12/13/2022    CALCIUM 9.2 11/02/2023    AST 10 (L) 11/02/2023    ALT 10 11/02/2023    ALKPHOS 58 11/02/2023    TP 6.9 11/02/2023    ALB 4.1 11/02/2023 TBILI 0.34 11/02/2023    CHOLESTEROL 177 12/13/2022    HDL 40 (L) 12/13/2022    TRIG 244 (H) 12/13/2022    LDLCALC 88 12/13/2022    NONHDLC 137 12/13/2022    VALPROICTOT 13 (L) 08/10/2023    KKH1EDAPIDZS 3.882 11/02/2023    PREGUR negative 11/06/2022    PREGSERUM Negative 04/28/2022    RPR Non-Reactive 12/13/2022    HGBA1C 4.5 08/08/2023    EAG 82 08/08/2023       Imaging Studies:   No results found. Code Status: Prior  Advance Directive and Living Will: <no information>    Assessment/Plan   Principal Problem:    Major depression with psychotic features ([address])  Active Problems:    Primary hypertension    Hyperlipidemia    Class 3 severe obesity due to excess calories without serious comorbidity in [facility])    Chronic midline low back pain without sciatica    PTSD (post-traumatic stress disorder)    Vertigo    Seizures ([address])    Insomnia    Anemia      Patient Strengths: cooperative, negotiates basic needs, patient is on a voluntary commitment     Patient Barriers: chronic mental illness, limited education, poor insight    Treatment Plan:     Planned Treatment and Medication Changes: All current active medications have been reviewed  Encourage group therapy, milieu therapy and occupational therapy  Behavioral Health checks every 7 minutes  Medication record from [address] [place] [address], were reviewed. Depakote 1000 mg po hs (SZ disorder)   Prozac 40 mg po daily  Trazodone 100 mg po hs  Risperidone 2 mg po q 12 hours  Cogentin and Ativan prn    Risks / Benefits of Treatment:    Risks, benefits, and possible side effects of medications explained to patient and patient verbalizes understanding and agreement for treatment. Counseling / Coordination of Care:    Patient's presentation on admission and proposed treatment plan discussed with treatment team.  Diagnosis, medication changes and treatment plan reviewed with patient. Events leading to admission reviewed with patient.     Inpatient Psychiatric Certification:    Estimated length of stay: 10 midnights      Madelin Camejo MD 11/03/23
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
## 2023-11-03 NOTE — PLAN OF CARE
Problem: SELF HARM/SUICIDALITY  Goal: Will have no self-injury during hospital stay  Description: INTERVENTIONS:  - Q 15 MINUTES: Routine safety checks  - Q WAKING SHIFT & PRN: Assess risk to determine if routine checks are adequate to maintain patient safety  - Encourage patient to participate actively in care by formulating a plan to combat response to suicidal ideation, identify supports and resources  Outcome: Progressing     Problem: DEPRESSION  Goal: Will be euthymic at discharge  Description: INTERVENTIONS:  - Administer medication as ordered  - Provide emotional support via 1:1 interaction with staff  - Encourage involvement in milieu/groups/activities  - Monitor for social isolation  Outcome: Progressing     Patient newly admitted this shift. Care plan initiated.

## 2023-11-03 NOTE — PROGRESS NOTES
11/03/23 1206   C-SSRS Risk (Since Last Contact)   Calculated C-SSRS Risk Score (Since Last Contact) Low Risk     Patient's C-SSRS shift score is low. Lifetime score is moderate. Dr. Poppy Vazquez aware. In agreement that patient is safe to remain on Q7 minute checks at this time. She agrees to come to staff if she begins to have any suicidal thoughts.

## 2023-11-03 NOTE — NURSING NOTE
Patient's PTA medications were verified by comparing MAR from [facility] from [facility] ED where patient was overnight into this morning.

## 2023-11-04 LAB
25(OH)D3 SERPL-MCNC: 17 NG/ML (ref 30–100)
FOLATE SERPL-MCNC: 13.4 NG/ML
VIT B12 SERPL-MCNC: 379 PG/ML (ref 180–914)

## 2023-11-04 PROCEDURE — 99232 SBSQ HOSP IP/OBS MODERATE 35: CPT

## 2023-11-04 RX ORDER — ERGOCALCIFEROL 1.25 MG/1
50000 CAPSULE ORAL WEEKLY
Status: DISCONTINUED | OUTPATIENT
Start: 2023-11-04 | End: 2023-11-16 | Stop reason: HOSPADM

## 2023-11-04 RX ORDER — MELATONIN
1000 DAILY
Status: DISCONTINUED | OUTPATIENT
Start: 2024-01-06 | End: 2023-11-16 | Stop reason: HOSPADM

## 2023-11-04 RX ORDER — RISPERIDONE 0.5 MG/1
0.5 TABLET ORAL
Status: DISCONTINUED | OUTPATIENT
Start: 2023-11-04 | End: 2023-11-16 | Stop reason: HOSPADM

## 2023-11-04 RX ADMIN — LORAZEPAM 1 MG: 1 TABLET ORAL at 09:11

## 2023-11-04 RX ADMIN — DIVALPROEX SODIUM 1000 MG: 500 TABLET, FILM COATED, EXTENDED RELEASE ORAL at 21:37

## 2023-11-04 RX ADMIN — MECLIZINE 25 MG: 12.5 TABLET ORAL at 17:00

## 2023-11-04 RX ADMIN — SENNOSIDES AND DOCUSATE SODIUM 2 TABLET: 8.6; 5 TABLET ORAL at 21:37

## 2023-11-04 RX ADMIN — PRAZOSIN HYDROCHLORIDE 2 MG: 1 CAPSULE ORAL at 21:37

## 2023-11-04 RX ADMIN — ERGOCALCIFEROL 50000 UNITS: 1.25 CAPSULE, LIQUID FILLED ORAL at 17:00

## 2023-11-04 RX ADMIN — CYANOCOBALAMIN TAB 500 MCG 500 MCG: 500 TAB at 17:00

## 2023-11-04 RX ADMIN — CEPHALEXIN 500 MG: 250 CAPSULE ORAL at 17:00

## 2023-11-04 RX ADMIN — CEPHALEXIN 500 MG: 250 CAPSULE ORAL at 12:32

## 2023-11-04 RX ADMIN — Medication 3 MG: at 21:37

## 2023-11-04 RX ADMIN — RISPERIDONE 2 MG: 2 TABLET ORAL at 21:37

## 2023-11-04 RX ADMIN — PANTOPRAZOLE SODIUM 20 MG: 20 TABLET, DELAYED RELEASE ORAL at 06:12

## 2023-11-04 RX ADMIN — CEPHALEXIN 500 MG: 250 CAPSULE ORAL at 21:38

## 2023-11-04 RX ADMIN — FLUOXETINE 40 MG: 20 CAPSULE ORAL at 09:11

## 2023-11-04 RX ADMIN — MECLIZINE 25 MG: 12.5 TABLET ORAL at 09:11

## 2023-11-04 RX ADMIN — CEPHALEXIN 500 MG: 250 CAPSULE ORAL at 06:12

## 2023-11-04 RX ADMIN — Medication: at 09:11

## 2023-11-04 RX ADMIN — RISPERIDONE 2 MG: 2 TABLET ORAL at 12:32

## 2023-11-04 RX ADMIN — TRAZODONE HYDROCHLORIDE 100 MG: 100 TABLET ORAL at 21:38

## 2023-11-04 NOTE — PROGRESS NOTES
Progress Note - Behavioral Health   Leydi Galilea Saravia 47 y.o. female MRN: [id number]  Unit/Bed#: [facility] 201-02 Encounter: [id number]    Assessment/Plan   Principal Problem:    Major depression with psychotic features ([address])  Active Problems:    Primary hypertension    Hyperlipidemia    Class 3 severe obesity due to excess calories without serious comorbidity in adult [facility])    Chronic midline low back pain without sciatica    PTSD (post-traumatic stress disorder)    Vertigo    Seizures ([address])    Insomnia    Anemia      Subjective:Patient was seen today for continuation of care, records reviewed and  patient was discussed with the morning case review team.  Danielle Marquez recently admitted from group home due to depression, anxiety, unstable mood, auditory hallucinations telling her to run in traffic. Patient reports sleeping on and off last night, trying to settle into the unit. Received as needed lorazepam this morning with mild effectiveness. Endorses chronic anxiety and depression specifically related to money issues. Reports command AH are decreasing in intensity and is doing her best to ignore them. Denies having any intent or plan to act on hallucinations telling her to run into traffic, contract for safety on the unit, agrees to tell staff if any plan develops. She appears flat, depressed, withdrawn-agreeable attempt participation in milieu therapy. Has been compliant with all psychiatric medications and is denying adverse effects. Patient denies endorsing any suicidal or homicidal ideation.      Psychiatric Review of Systems:    Sleep: slept off and on  Appetite: fair  Medication side effects: No   ROS: all other systems are negative    Vitals:  Vitals:    11/04/23 0806   BP: 143/74   Pulse: 84   Resp: 18   Temp: 98.2 °F (36.8 °C)   SpO2: 95%       Mental Status Evaluation:    Appearance:  age appropriate, looks older than stated age, overweight   Behavior:  cooperative   Speech:  normal rate and volume   Mood:  depressed Affect:  labile   Thought Process:  concrete   Associations: concrete associations   Thought Content:  mild paranoia   Perceptual Disturbances: auditory hallucinations without commands still present, but less frequent   Risk Potential: Suicidal ideation - None at present  Homicidal ideation - None at present  Potential for aggression - No   Sensorium:  oriented to person and place   Memory:  recent and remote memory grossly intact   Consciousness:  alert and awake   Attention: attention span and concentration are age appropriate   Insight:  limited   Judgment: limited   Gait/Station: normal gait/station   Motor Activity: no abnormal movements     Laboratory results:  I have personally reviewed all pertinent laboratory/tests results.   Most Recent Labs:   Lab Results   Component Value Date    WBC 7.36 11/03/2023    RBC 3.82 11/03/2023    HGB 11.9 11/03/2023    HCT 36.5 11/03/2023     11/03/2023    RDW 14.5 11/03/2023    TOTANEUTABS 5.83 12/13/2022    NEUTROABS 4.33 11/03/2023    SODIUM 134 (L) 11/03/2023    K 4.2 11/03/2023    CL 97 11/03/2023    CO2 31 11/03/2023    BUN 13 11/03/2023    CREATININE 0.50 (L) 11/03/2023    GLUC 84 11/03/2023    GLUF 130 (H) 12/13/2022    CALCIUM 9.2 11/03/2023    AST 10 (L) 11/02/2023    ALT 10 11/02/2023    ALKPHOS 58 11/02/2023    TP 6.9 11/02/2023    ALB 4.1 11/02/2023    TBILI 0.34 11/02/2023    CHOLESTEROL 212 (H) 11/03/2023    HDL 42 (L) 11/03/2023    TRIG 223 (H) 11/03/2023    LDLCALC 125 (H) 11/03/2023    NONHDLC 170 11/03/2023    VALPROICTOT 13 (L) 08/10/2023    [id number] 3.882 11/02/2023    PREGUR negative 11/06/2022    PREGSERUM Negative 04/28/2022    RPR Non-Reactive 12/13/2022    HGBA1C 4.5 08/08/2023    EAG 82 08/08/2023     Depakote:   Lab Results   Component Value Date    VALPROICTOT 13 (L) 08/10/2023         Recommended Treatment:     -Continue current medication regimen      All current active medications have been reviewed  Encourage group therapy, milieu therapy and occupational therapy  Continue treatment with group therapy, milieu therapy and occupational therapy  Behavioral Health checks every 7 minutes  Continue current medications:  Current Facility-Administered Medications   Medication Dose Route Frequency Provider Last Rate    acetaminophen  650 mg Oral Q4H PRN Sarah L Dagnall, PA-C      acetaminophen  650 mg Oral Q6H PRN Sarah L Dagnall, PA-C      acetaminophen  975 mg Oral Q6H PRN Sarah L Dagnall, PA-C      aluminum-magnesium hydroxide-simethicone  30 mL Oral Q4H PRN Frosty Renetta, MD      ammonium lactate   Topical BID Sarah L Stoneynall, PA-C      benztropine  1 mg Oral BID PRN Sharifa Bel, MD      cephalexin  500 mg Oral Q6H [address] Sarah L Roel, PA-C      divalproex sodium  1,000 mg Oral HS Sharifa Gladys, MD      FLUoxetine  40 mg Oral Daily Sharifa Gladys, MD      haloperidol lactate  5 mg Intramuscular Q4H PRN Max 4/day Frosty Renetta, MD      hydrOXYzine HCL  50 mg Oral Q6H PRN Max 4/day Sharifa Bel, MD      LORazepam  1 mg Intramuscular Q6H PRN Max 3/day Frosty Renetta, MD      LORazepam  1 mg Oral Q8H PRN Sharifa Bel, MD      meclizine  25 mg Oral BID Sarah LETTY Luanall, PA-C      melatonin  3 mg Oral HS Frosty Renetta, MD      menthol-methyl salicylate   Apply externally 4x Daily PRN Sarah LETTY Stoneynall, PA-C      nicotine polacrilex  4 mg Oral Q2H PRN Frosty Renetta, MD      nystatin   Topical BID Sarah LETTY Luanall, PA-C      pantoprazole  20 mg Oral Early Morning Sarah L Dagnall, PA-C      polyethylene glycol  17 g Oral Daily PRN Sarah L Dagnall, PA-C      prazosin  2 mg Oral HS Sarah L Roel, PA-C      risperiDONE  0.25 mg Oral Q4H PRN Max 6/day Frosty Renetta, MD      risperiDONE  0.5 mg Oral Q4H PRN Max 3/day SHANI Bailey      risperiDONE  1 mg Oral Q2H PRN Max 3/day Frosty Renetta, MD      risperiDONE  2 mg Oral Q12H Sharifa Gladys, MD      senna-docusate sodium  2 tablet Oral HS Sarah L Roel, PA-C traZODone  100 mg Oral HS Yola Ayala MD         Risks / Benefits of Treatment:     Risks, benefits, and possible side effects of medications explained to patient. Patient has limited understanding of risks and benefits of treatment at this time, but agrees to take medications as prescribed. Counseling / Coordination of Care:     Patient's progress reviewed with nursing staff. Medications, treatment progress and treatment plan reviewed with patient. Supportive counseling provided to the patient.           SHANI Romano

## 2023-11-04 NOTE — NURSING NOTE
Patient visible on the unit. Flat, withdrawn to self. Out for group. Compliant with evening medications. Denies SI, HI, AVH. Endorses moderate anxiety and depression. No complaints of pain. Continuous rounding maintained.

## 2023-11-04 NOTE — NURSING NOTE
Patient sitting in dorcas-chair by nurse's station. Reports feeling safe, less anxious, and less bothered by the voices at this time. Medication effective.

## 2023-11-04 NOTE — PLAN OF CARE
Problem: Alteration in Thoughts and Perception  Goal: Verbalize thoughts and feelings  Description: Interventions:  - Promote a nonjudgmental and trusting relationship with the patient through active listening and therapeutic communication  - Assess patient's level of functioning, behavior and potential for risk  - Engage patient in 1 on 1 interactions  - Encourage patient to express fears, feelings, frustrations, and discuss symptoms    - [place] patient to reality, help patient recognize reality-based thinking   - Administer medications as ordered and assess for potential side effects  - Provide the patient education related to the signs and symptoms of the illness and desired effects of prescribed medications  Outcome: Progressing     Problem: Ineffective Coping  Goal: Cooperates with admission process  Description: Interventions:   - Complete admission process  Outcome: Completed  Goal: Understands least restrictive measures  Description: Interventions:  - Utilize least restrictive behavior  Outcome: Progressing  Goal: Free from restraint events  Description: - Utilize least restrictive measures   - Provide behavioral interventions   - Redirect inappropriate behaviors   Outcome: Progressing

## 2023-11-04 NOTE — PROGRESS NOTES
Progress Note - Carl Keith 47 y.o. female MRN: [id number]    Unit/Bed#[facility] 201-02 Encounter: [id number]        Subjective:   Patient seen and examined at bedside after reviewing the chart and discussing the case with the caring staff. Patient examined at bedside. Patient was found to have evidence of UTI with moderate amount of leukocytes. Patient was a started on Keflex 500 mg every 6 hours for 5 days. On review of patient's labs it was found that patient's vitamin D level was low 17.0 and B12 level is also low 379. Physical Exam   Vitals: Blood pressure 143/74, pulse 84, temperature 98.2 °F (36.8 °C), temperature source Temporal, resp. rate 18, height 5' 6" (1.676 m), weight 117 kg (258 lb), SpO2 95 %. ,Body mass index is 41.64 kg/m². Constitutional: He appears well-developed. HEENT: PERR, EOMI, MMM  Cardiovascular: Normal rate and regular rhythm. Pulmonary/Chest: Effort normal and breath sounds normal.   Abdomen: Soft, + BS, NT    Assessment/Plan:     1. Cardiac with hx of HTN, HLD. Continue Prazosin 2 mg nightly. Continue to monitor. 2.  GERD. Continue Protonix 20 mg daily. 3.  Constipation. Continue Senokot S 2 tablets daily at bedtime. 4.  Seizure disorder. Seizure precautions. Patient is on Depakote 1000 mg nightly. Reports last seizure was over 10 years ago. 5.  DJD/OA. Tylenol as needed. 6.  Dry skin. Apply ammonium lactate to bilateral feet twice daily. 7.  Vertigo. Continue meclizine 25 mg twice daily. 8.  Intertrigo. Apply nystatin powder twice daily. 9.  Acute UTI. Patient will be continued on Keflex 500 mg q6h x 5 days. 10.  New vitamin D deficiency. I will put the patient on vitamin D bolus doses for 10 weeks followed by vitamin D3 1000 units daily. 11.  New vitamin B12 deficiency. Patient will be put on B12 supplements.

## 2023-11-04 NOTE — NURSING NOTE
Patient endorsing severe anxiety and depression this morning. She denies active suicidal thoughts but does endorse a passive death wish. She also reports hearing voices telling her to hurt herself. She agrees not to at this time and is agreeable to sit in a dorcas-chair at the nurse's station for safety. No complaints of pain. She takes all medications without difficulty.

## 2023-11-04 NOTE — PROGRESS NOTES
11/04/23 0911   Smith Anxiety Scale   Anxious Mood 4   Tension 2   Fears 4   Insomnia 0   Intellectual 3   Depressed Mood 4   Somatic Complaints: Muscular 1   Somatic Complaints: Sensory 0   Cardiovascular Symptoms 0   Respiratory Symptoms 0   Gastrointestinal Symptoms 0   Genitourinary Symptoms 0   Autonomic Symptoms 1   Behavior at Interview 1   Smith Anxiety Score 20     Patient reporting severe anxiety this morning due to her hallucinations and is requesting PRN medication. 1mg Ativan administered at 0911 for moderate anxiety as indicated by [facility] score.

## 2023-11-05 PROCEDURE — 97163 PT EVAL HIGH COMPLEX 45 MIN: CPT

## 2023-11-05 PROCEDURE — 99232 SBSQ HOSP IP/OBS MODERATE 35: CPT

## 2023-11-05 RX ORDER — RISPERIDONE 2 MG/1
2 TABLET ORAL 2 TIMES DAILY
Status: DISCONTINUED | OUTPATIENT
Start: 2023-11-05 | End: 2023-11-09

## 2023-11-05 RX ADMIN — RISPERIDONE 2 MG: 2 TABLET ORAL at 17:03

## 2023-11-05 RX ADMIN — CEPHALEXIN 500 MG: 250 CAPSULE ORAL at 11:38

## 2023-11-05 RX ADMIN — CYANOCOBALAMIN TAB 500 MCG 500 MCG: 500 TAB at 08:37

## 2023-11-05 RX ADMIN — CEPHALEXIN 500 MG: 250 CAPSULE ORAL at 17:03

## 2023-11-05 RX ADMIN — PRAZOSIN HYDROCHLORIDE 2 MG: 1 CAPSULE ORAL at 21:41

## 2023-11-05 RX ADMIN — TRAZODONE HYDROCHLORIDE 100 MG: 100 TABLET ORAL at 21:41

## 2023-11-05 RX ADMIN — MENTHOL, METHYL SALICYLATE 1 APPLICATION: 10; 15 CREAM TOPICAL at 21:47

## 2023-11-05 RX ADMIN — DIVALPROEX SODIUM 1000 MG: 500 TABLET, FILM COATED, EXTENDED RELEASE ORAL at 21:41

## 2023-11-05 RX ADMIN — CEPHALEXIN 500 MG: 250 CAPSULE ORAL at 06:00

## 2023-11-05 RX ADMIN — PANTOPRAZOLE SODIUM 20 MG: 20 TABLET, DELAYED RELEASE ORAL at 06:00

## 2023-11-05 RX ADMIN — MECLIZINE 25 MG: 12.5 TABLET ORAL at 17:03

## 2023-11-05 RX ADMIN — MECLIZINE 25 MG: 12.5 TABLET ORAL at 08:37

## 2023-11-05 RX ADMIN — FLUOXETINE 40 MG: 20 CAPSULE ORAL at 08:37

## 2023-11-05 RX ADMIN — CEPHALEXIN 500 MG: 250 CAPSULE ORAL at 21:41

## 2023-11-05 RX ADMIN — SENNOSIDES AND DOCUSATE SODIUM 2 TABLET: 8.6; 5 TABLET ORAL at 21:41

## 2023-11-05 RX ADMIN — RISPERIDONE 2 MG: 2 TABLET ORAL at 11:38

## 2023-11-05 RX ADMIN — Medication 3 MG: at 21:41

## 2023-11-05 RX ADMIN — Medication 1 APPLICATION: at 21:42

## 2023-11-05 NOTE — PLAN OF CARE
Problem: Alteration in Thoughts and Perception  Goal: Verbalize thoughts and feelings  Description: Interventions:  - Promote a nonjudgmental and trusting relationship with the patient through active listening and therapeutic communication  - Assess patient's level of functioning, behavior and potential for risk  - Engage patient in 1 on 1 interactions  - Encourage patient to express fears, feelings, frustrations, and discuss symptoms    - [place] patient to reality, help patient recognize reality-based thinking   - Administer medications as ordered and assess for potential side effects  - Provide the patient education related to the signs and symptoms of the illness and desired effects of prescribed medications  Outcome: Progressing     Problem: Ineffective Coping  Goal: Understands least restrictive measures  Description: Interventions:  - Utilize least restrictive behavior  Outcome: Progressing  Goal: Free from restraint events  Description: - Utilize least restrictive measures   - Provide behavioral interventions   - Redirect inappropriate behaviors   Outcome: Progressing

## 2023-11-05 NOTE — PHYSICAL THERAPY NOTE
Physical Therapy Evaluation   Time in: 1335  Time out: 1345  Total evaluation time: 10 minutes    Patient's Name: Edmundo Stevens    Admitting Diagnosis  Major depressive disorder, single episode, unspecified [F32.9]  Bipolar 1 disorder ([address]) [F31.9]    Problem List  Patient Active Problem List   Diagnosis    Primary hypertension    Hyperlipidemia    Major depression with psychotic features ([address])    Class 3 severe obesity due to excess calories without serious comorbidity in adult [facility])    Chronic midline low back pain without sciatica    PTSD (post-traumatic stress disorder)    Vertigo    Closed fracture of base of fifth metatarsal bone    Borderline personality disorder ([address])    Left leg pain    Seizures ([address])    Hyponatremia    Insomnia    History of non-suicidal self-harm    Anemia       Past Medical History  Past Medical History:   Diagnosis Date    Anxiety     Bipolar 1 disorder ([address])     Bipolar affective disorder, depressed, severe ([address]) 10/10/2021    Borderline personality disorder ([address])     CAD (coronary artery disease)     Cognitive impairment     Depression     Dyslipidemia     GERD (gastroesophageal reflux disease)     Hypertension     Obesity     Psychiatric disorder     Psychiatric illness     PTSD (post-traumatic stress disorder)     Schizoaffective disorder (HCC)     Seizure ([address])        Past Surgical History  Past Surgical History:   Procedure Laterality Date    APPENDECTOMY      PATIENT DENIES HAVING APPENDIX REMOVED    CHOLECYSTECTOMY      FOOT SURGERY Right        PT performed at least 2 patient identifiers during session: Name and wristband. 11/05/23 1345   PT Last Visit   PT Visit Date 11/05/23   Note Type   Note type Evaluation   Pain Assessment   Pain Assessment Tool 0-10   Pain Score 6   Pain Location/Orientation Orientation: Left; Location: Shoulder  (anterior upper arm/ region of biceps)   Pain Onset/Description Onset: Ongoing;Frequency: Intermittent  (with arm elevation)   Patient's Stated Pain Goal No pain   Hospital Pain Intervention(s) Repositioned; Emotional support   Restrictions/Precautions   Weight Bearing Precautions Per Order No   Other Precautions Fall Risk;Pain  (obesity)   Home Living   Type of Home Group Home  [facility])   Home Layout Two level; Able to live on main level with bedroom/bathroom; Performs ADLs on one level  (1st floor setup)   [address]   (pt denies use of AD primarily at baseline, will utilize RW if +dizziness)   Prior Function   Level of [place] Independent with functional mobility; Needs assistance with ADLs; Needs assistance with IADLS   Lives With   (staff at group home)   [facility] attendant  (staff at group home)   IADLs Family/Friend/Other provides transportation; Family/Friend/Other provides meals; Family/Friend/Other provides medication management   Falls in the last 6 months 0   General   Family/Caregiver Present No   Cognition   Arousal/Participation Alert   Orientation Level Oriented X4   Memory Decreased recall of precautions;Decreased recall of biographical information   Following Commands Follows one step commands with increased time or repetition   Comments pt agreeable to PT session   Subjective   Subjective "I don't feel like I hurt my shoulder at all"   RUE Assessment   RUE Assessment WFL   LUE Assessment   LUE Assessment X   LUE Overall AROM   L Shoulder Flexion 90*   LUE Overall PROM   L Shoulder Flexion full 180*, pt with noted ability to assist with eccentric phase utilizing biceps on lowering arm   LUE Strength   LUE Overall Strength   (DNT MMT to shoulder; elbow and wrist MMT grossly = 4/5)   RLE Assessment   RLE Assessment WFL   LLE Assessment   LLE Assessment WFL   Coordination   Movements are Fluid and Coordinated 0   Coordination and Movement Description segmental movements   Sensation WFL   Bed Mobility   Supine to Sit Unable to assess  (pt received seated OOB in hallway by telephones)   Transfers   Sit to Stand 6  Modified independent   Stand to Sit 6  Modified independent   Additional Comments pt endorses + dizziness during standing tasks which progressed during level surface mobility   Ambulation/Elevation   Gait pattern Wide OJJO; Decreased foot clearance; Short stride   Gait Assistance 5  Supervision   Additional items Assist x 1   Assistive Device None   Distance 120 ft   Stair Management Assistance Not tested   Balance   Static Sitting Normal   Dynamic Sitting Good   Static Standing Good   Dynamic Standing Fair +   Ambulatory Fair +   Endurance Deficit   Endurance Deficit Yes   Endurance Deficit Description compared to baseline   Activity Tolerance   Activity Tolerance Patient limited by pain  (L shoulder pain and dizziness)   Nurse Made Aware Yes   Assessment   Prognosis Good   Problem List Decreased range of motion;Decreased strength;Decreased mobility;Obesity;Pain;Decreased endurance   Assessment Pt is 47 y.o. female seen for high-complexity PT evaluation on 11/5/2023 s/p admit to [address] [place] [address] on 11/3/2023 w/ Major depression with psychotic features ([address] PT was consulted to assess pt's functional mobility and d/c needs. Order placed for PT eval and tx, w/ up and OOB as tolerated order. PTA, pt resides at [address] [facility], reports assistance for ADLs provided at baseline, ambulatory (I) c or s RW- depends on dizziness s/s. At time of eval, pt performs STS transfers at MOD I level, amb x 120 ft with SUP. Upon evaluation, pt presenting with impaired functional mobility d/t decreased strength, decreased ROM, decreased endurance, decreased mobility, obesity c BMI of 41.64 kg/m2, and pain. Pertinent PMHx and current co-morbidities affecting pt's physical performance at time of assessment include: HTN, HLD, GERD, constipation, seizure disorder, DJD/OA, vertigo, intertrigo, acute UTI.  Personal factors affecting pt at time of eval include: limited insight into impairments. The following objective measures performed on IE also reveal limitations: -PAC 6-Clicks: 68/62. Pt's clinical presentation is currently unstable/unpredictable seen in pt's presentation of ongoing medical assessment and depression, anxiety, unstable mood & auditory hallucinations on admission . Overall, pt's rehab potential and prognosis to return to PLOF is good as impacted by objective findings, warranting pt to receive further skilled PT interventions to address identified impairments, activity limitation(s), and participation restriction(s). Pt to benefit from continued PT tx to address deficits as defined above and maximize level of functional independent mobility and consistency in order for pt to improve activity tolerance. From PT/mobility standpoint, recommendation at time of d/c would be level 1 (minimum resource intensity), pending progress in order to facilitate return to PLOF. Barriers to Discharge None   Goals   Patient Goals "to feel better"   [facility] Expiration Date 11/25/23   Short Term Goal #1 In 15-20 days: Perform all transfers independently to improve independence, Ambulate > 250 ft. with least restrictive assistive device modified independent w/o LOB and w/ normalized gait pattern 100% of the time, Increase dynamic standing and ambulatory balance 1/2 grade to decrease risk for falls, Complete exercise program independently, and Ascertain vestibular deficits/needs   PT Treatment Day 0   Plan   Treatment/Interventions LE strengthening/ROM; Therapeutic exercise; Endurance training;Patient/family training;Equipment eval/education;Gait training;Spoke to nursing   PT Frequency 2-3x/wk   Discharge Recommendation   Rehab Resource Intensity Level, PT III (Minimum Resource Intensity)  (OP PT with focus on L shoulder impairments & h/o vertigo with + dizziness)   Equipment Recommended   (none at this time; TBD)   Additional Comments Pt's raw score on the AM-PAC Basic Mobility inpatient short form is 21, standardized score is 45.55. Patients at this level are likely to benefit from DC to home with no services, however, please refer to therapist recommendation for safe DC planning. AM-PAC Basic Mobility Inpatient   Turning in Flat Bed Without Bedrails 4   Lying on Back to Sitting on Edge of Flat Bed Without Bedrails 4   Moving Bed to Chair 4   Standing Up From Chair Using Arms 4   Walk in Room 3   Climb 3-5 Stairs With Railing 2   Basic Mobility Inpatient Raw Score 21   Basic Mobility Standardized Score 45.55   Highest Level Of Mobility   JH-HLM Goal 6: Walk 10 steps or more   JH-HLM Achieved 7: Walk 25 feet or more   End of Consult   Patient Position at End of Consult Bedside chair; All needs within reach  (pt returned to sitting OOB in hallway)       Kp Young, PT, DPT

## 2023-11-05 NOTE — NURSING NOTE
Patient visible in milieu, pleasant and cooperative in interaction. Social with staff and select peers. Patient denies anxiety however endorses moderate depression, denies SI/HI, visual hallucinations. Patient with auditory hallucinations telling her to "hurt" herself. Patient stated she would come to staff if she felt like harming self. Affect depressed, flat. Remains on Keflex series for UTI, offers no urinary C/O. Denies urinary frequency, urgency, dysuria. Remains medication compliant and on 7" checks for safety and behaviors. B - Abdomen soft, non-tender, non-distended, round with + bowel sounds x 4. No N/V. L - Lungs CTA, no cough/congestion or SOB. E - BLE edema, non pitting  P - Positive pedal pulses bilaterally, brisk capillary refill. S - Erythema under abdominal fold continues, patient currently refusing to use Mycostatin powder. Scratch to left arm with S/S infection. No other areas of erythema, edema, ecchymosis noted during this assessment.

## 2023-11-05 NOTE — PROGRESS NOTES
Progress Note - Joceline Smalls 47 y.o. female MRN: [id number]    Unit/Bed#[facility] 201-02 Encounter: [id number]        Subjective:   Patient seen and examined at bedside after reviewing the chart and discussing the case with the caring staff. Patient examined at bedside. Patient reports no acute symptoms. On review of patient's labs it was found that patient's vitamin D level was low 17.0 and B12 level is also low 379. Physical Exam   Vitals: Blood pressure 115/63, pulse 80, temperature (!) 97.3 °F (36.3 °C), temperature source Temporal, resp. rate 16, height 5' 6" (1.676 m), weight 117 kg (258 lb), SpO2 93 %. ,Body mass index is 41.64 kg/m². Constitutional: He appears well-developed. HEENT: PERR, EOMI, MMM  Cardiovascular: Normal rate and regular rhythm. Pulmonary/Chest: Effort normal and breath sounds normal.   Abdomen: Soft, + BS, NT    Assessment/Plan:     1. Cardiac with hx of HTN, HLD. Continue Prazosin 2 mg nightly. Continue to monitor. 2.  GERD. Continue Protonix 20 mg daily. 3.  Constipation. Continue Senokot S 2 tablets daily at bedtime. 4.  Seizure disorder. Seizure precautions. Patient is on Depakote 1000 mg nightly. Reports last seizure was over 10 years ago. 5.  DJD/OA. Tylenol as needed. 6.  Dry skin. Apply ammonium lactate to bilateral feet twice daily. 7.  Vertigo. Continue meclizine 25 mg twice daily. 8.  Intertrigo. Apply nystatin powder twice daily. 9.  Acute UTI. Patient will be continued on Keflex 500 mg q6h x 5 days. 10.  New vitamin D deficiency. I will put the patient on vitamin D bolus doses for 10 weeks followed by vitamin D3 1000 units daily. 11.  New vitamin B12 deficiency. Patient will be put on B12 supplements.

## 2023-11-05 NOTE — PLAN OF CARE
Problem: PHYSICAL THERAPY ADULT  Goal: Performs mobility at highest level of function for planned discharge setting. See evaluation for individualized goals. Description: Treatment/Interventions: LE strengthening/ROM, Therapeutic exercise, Endurance training, Patient/family training, Equipment eval/education, Gait training, Spoke to nursing  Equipment Recommended:  (none at this time; TBD)       See flowsheet documentation for full assessment, interventions and recommendations. Note: Prognosis: Good  Problem List: Decreased range of motion, Decreased strength, Decreased mobility, Obesity, Pain, Decreased endurance  Assessment: Pt is 47 y.o. female seen for high-complexity PT evaluation on 11/5/2023 s/p admit to [address] on 11/3/2023 w/ Major depression with psychotic features ([address]). PT was consulted to assess pt's functional mobility and d/c needs. Order placed for PT eval and tx, w/ up and OOB as tolerated order. PTA, pt resides at [address] home, reports assistance for ADLs provided at baseline, ambulatory (I) c or s RW- depends on dizziness s/s. At time of eval, pt performs STS transfers at MOD I level, amb x 120 ft with SUP. Upon evaluation, pt presenting with impaired functional mobility d/t decreased strength, decreased ROM, decreased endurance, decreased mobility, obesity c BMI of 41.64 kg/m2, and pain. Pertinent PMHx and current co-morbidities affecting pt's physical performance at time of assessment include: HTN, HLD, GERD, constipation, seizure disorder, DJD/OA, vertigo, intertrigo, acute UTI. Personal factors affecting pt at time of eval include: limited insight into impairments. The following objective measures performed on IE also reveal limitations: AM-PAC 6-Clicks: 83/02.  Pt's clinical presentation is currently unstable/unpredictable seen in pt's presentation of ongoing medical assessment and depression, anxiety, unstable mood & auditory hallucinations on admission . Overall, pt's rehab potential and prognosis to return to PLOF is good as impacted by objective findings, warranting pt to receive further skilled PT interventions to address identified impairments, activity limitation(s), and participation restriction(s). Pt to benefit from continued PT tx to address deficits as defined above and maximize level of functional independent mobility and consistency in order for pt to improve activity tolerance. From PT/mobility standpoint, recommendation at time of d/c would be level 1 (minimum resource intensity), pending progress in order to facilitate return to PLOF. Barriers to Discharge: None     Rehab Resource Intensity Level, PT: III (Minimum Resource Intensity) (OP PT with focus on L shoulder impairments & h/o vertigo with + dizziness)    See flowsheet documentation for full assessment.

## 2023-11-05 NOTE — NURSING NOTE
Patient C/O numbness/tingling/pain bilateral hands. Refusing PRN pain medication. Patient feeding self without difficulty. MD aware of same. Remains on 7" checks for safety and behaviors.

## 2023-11-05 NOTE — PROGRESS NOTES
Progress Note - Behavioral Health   Leydi Destin Hilario 47 y.o. female MRN: [id number]  Unit/Bed#: Rei Chrissy 201-02 Encounter: [id number]    Assessment/Plan   Principal Problem:    Major depression with psychotic features ([address])  Active Problems:    Primary hypertension    Hyperlipidemia    Class 3 severe obesity due to excess calories without serious comorbidity in adult [facility])    Chronic midline low back pain without sciatica    PTSD (post-traumatic stress disorder)    Vertigo    Seizures ([address])    Insomnia    Anemia      Subjective:Patient was seen today for continuation of care, records reviewed and  patient was discussed with the morning case review team. Milind Monik to report derogatory auditory hallucinations telling patient she is no good and no nobody likes her, frequency of hallucination decreasing somewhat with recent Risperdal 2 mg twice daily. Denies command hallucinations today, appears to be in no distress during conversation. Denies any thoughts of self-harm or homicidal ideation. She is more pleasant, calm compared to previous day. Reports compliance with Risperdal and denying side effects. Reporting adequate sleep and appetite on the unit.     Psychiatric Review of Systems:    Sleep: slept better  Appetite: fair  Medication side effects: No   ROS: all other systems are negative    Vitals:  Vitals:    11/05/23 0704   BP: 115/63   Pulse: 80   Resp: 16   Temp: (!) 97.3 °F (36.3 °C)   SpO2: 93%       Mental Status Evaluation:    Appearance:  age appropriate, looks older than stated age, overweight   Behavior:  pleasant, cooperative, calm   Speech:  normal rate and volume   Mood:  depressed   Affect:  constricted   Thought Process:  concrete   Associations: concrete associations   Thought Content:  mild paranoia   Perceptual Disturbances: auditory hallucinations without commands still present, but less frequent   Risk Potential: Suicidal ideation - None at present  Homicidal ideation - None at present  Potential for aggression - No   Sensorium:  oriented to person and place   Memory:  recent and remote memory grossly intact   Consciousness:  alert and awake   Attention: attention span and concentration are age appropriate   Insight:  limited   Judgment: limited   Gait/Station: normal gait/station   Motor Activity: no abnormal movements     Laboratory results:  I have personally reviewed all pertinent laboratory/tests results.   Most Recent Labs:   Lab Results   Component Value Date    WBC 7.36 11/03/2023    RBC 3.82 11/03/2023    HGB 11.9 11/03/2023    HCT 36.5 11/03/2023     11/03/2023    RDW 14.5 11/03/2023    TOTANEUTABS 5.83 12/13/2022    NEUTROABS 4.33 11/03/2023    SODIUM 134 (L) 11/03/2023    K 4.2 11/03/2023    CL 97 11/03/2023    CO2 31 11/03/2023    BUN 13 11/03/2023    CREATININE 0.50 (L) 11/03/2023    GLUC 84 11/03/2023    GLUF 130 (H) 12/13/2022    CALCIUM 9.2 11/03/2023    AST 10 (L) 11/02/2023    ALT 10 11/02/2023    ALKPHOS 58 11/02/2023    TP 6.9 11/02/2023    ALB 4.1 11/02/2023    TBILI 0.34 11/02/2023    CHOLESTEROL 212 (H) 11/03/2023    HDL 42 (L) 11/03/2023    TRIG 223 (H) 11/03/2023    LDLCALC 125 (H) 11/03/2023    NONHDLC 170 11/03/2023    VALPROICTOT 13 (L) 08/10/2023    [id number] 3.882 11/02/2023    PREGUR negative 11/06/2022    PREGSERUM Negative 04/28/2022    RPR Non-Reactive 12/13/2022    HGBA1C 4.5 08/08/2023    EAG 82 08/08/2023     Depakote:   Lab Results   Component Value Date    VALPROICTOT 13 (L) 08/10/2023         Recommended Treatment:     -Continue current medication regimen, denies adverse effects  -On Depakote 1,000mg hs for seizure disorder    All current active medications have been reviewed  Encourage group therapy, milieu therapy and occupational therapy  Continue treatment with group therapy, milieu therapy and occupational therapy  Behavioral Health checks every 7 minutes  Continue current medications:  Current Facility-Administered Medications   Medication Dose Route Frequency Provider Last Rate    acetaminophen  650 mg Oral Q4H PRN Sarah L Dagnall, PA-C      acetaminophen  650 mg Oral Q6H PRN Sarah L Dagnall, PA-C      acetaminophen  975 mg Oral Q6H PRN Sarah L Dagnall, PA-C      aluminum-magnesium hydroxide-simethicone  30 mL Oral Q4H PRN Socorro Rush MD      ammonium lactate   Topical BID Sarah L Dagnall, PA-C      benztropine  1 mg Oral BID PRN Mahnazl MD Benji      cephalexin  500 mg Oral Q6H [address] Sarah L Stoneynall, PA-C      [START ON 1/6/2024] cholecalciferol  1,000 Units Oral Daily Kirsty Hurtado MD      cyanocobalamin  500 mcg Oral Daily Kirsty Hurtado MD      divalproex sodium  1,000 mg Oral HS Tien Leblanc MD      ergocalciferol  50,000 Units Oral Weekly Kirsty Hurtado MD      FLUoxetine  40 mg Oral Daily Nardaryl NailMD rashard      haloperidol lactate  5 mg Intramuscular Q4H PRN Max 4/day Socorro Rush MD      hydrOXYzine HCL  50 mg Oral Q6H PRN Max 4/day Tien Leblanc MD      LORazepam  1 mg Intramuscular Q6H PRN Max 3/day Socorro Rush MD      LORazepam  1 mg Oral Q8H PRN Mahnazl NailMD rashard      meclizine  25 mg Oral BID Sarah L Stoneynall, PA-C      melatonin  3 mg Oral HS Socorro Rush MD      menthol-methyl salicylate   Apply externally 4x Daily PRN Sarah L Dagnall, PA-C      nicotine polacrilex  4 mg Oral Q2H PRN Socorro Rush MD      nystatin   Topical BID Sarah L Dagnall, PA-C      pantoprazole  20 mg Oral Early Morning Sarah L Dagnall, PA-C      polyethylene glycol  17 g Oral Daily PRN Sarah L Dagnall, PA-C      prazosin  2 mg Oral HS Sarah L Dagnall, PA-C      risperiDONE  0.25 mg Oral Q4H PRN Max 6/day Socorro Rush MD      risperiDONE  0.5 mg Oral Q4H PRN Max 3/day SHANI Piper      risperiDONE  1 mg Oral Q2H PRN Max 3/day Socorro Rush MD      risperiDONE  2 mg Oral BID Ane Peer, CRNP      senna-docusate sodium  2 tablet Oral HS Sarah Mckay PA-C      traZODone  100 mg Oral HS Tien Leblanc MD Risks / Benefits of Treatment:     Risks, benefits, and possible side effects of medications explained to patient. Patient has limited understanding of risks and benefits of treatment at this time, but agrees to take medications as prescribed. Counseling / Coordination of Care:     Patient's progress reviewed with nursing staff. Medications, treatment progress and treatment plan reviewed with patient. Supportive counseling provided to the patient.           SHANI Henao

## 2023-11-06 PROCEDURE — 97166 OT EVAL MOD COMPLEX 45 MIN: CPT

## 2023-11-06 PROCEDURE — 99233 SBSQ HOSP IP/OBS HIGH 50: CPT | Performed by: PSYCHIATRY & NEUROLOGY

## 2023-11-06 PROCEDURE — 97110 THERAPEUTIC EXERCISES: CPT

## 2023-11-06 RX ADMIN — DIVALPROEX SODIUM 1000 MG: 500 TABLET, FILM COATED, EXTENDED RELEASE ORAL at 21:37

## 2023-11-06 RX ADMIN — RISPERIDONE 0.5 MG: 0.5 TABLET ORAL at 20:04

## 2023-11-06 RX ADMIN — TRAZODONE HYDROCHLORIDE 100 MG: 100 TABLET ORAL at 21:37

## 2023-11-06 RX ADMIN — MECLIZINE 25 MG: 12.5 TABLET ORAL at 08:35

## 2023-11-06 RX ADMIN — CYANOCOBALAMIN TAB 500 MCG 500 MCG: 500 TAB at 08:37

## 2023-11-06 RX ADMIN — SENNOSIDES AND DOCUSATE SODIUM 2 TABLET: 8.6; 5 TABLET ORAL at 21:37

## 2023-11-06 RX ADMIN — CEPHALEXIN 500 MG: 250 CAPSULE ORAL at 12:22

## 2023-11-06 RX ADMIN — DICLOFENAC SODIUM 4 G: 10 GEL TOPICAL at 21:40

## 2023-11-06 RX ADMIN — RISPERIDONE 2 MG: 2 TABLET ORAL at 17:26

## 2023-11-06 RX ADMIN — Medication 3 MG: at 21:37

## 2023-11-06 RX ADMIN — DICLOFENAC SODIUM 4 G: 10 GEL TOPICAL at 12:22

## 2023-11-06 RX ADMIN — CEPHALEXIN 500 MG: 250 CAPSULE ORAL at 17:25

## 2023-11-06 RX ADMIN — RISPERIDONE 2 MG: 2 TABLET ORAL at 08:35

## 2023-11-06 RX ADMIN — LORAZEPAM 1 MG: 1 TABLET ORAL at 20:04

## 2023-11-06 RX ADMIN — PRAZOSIN HYDROCHLORIDE 2 MG: 1 CAPSULE ORAL at 21:37

## 2023-11-06 RX ADMIN — CEPHALEXIN 500 MG: 250 CAPSULE ORAL at 00:30

## 2023-11-06 RX ADMIN — CEPHALEXIN 500 MG: 250 CAPSULE ORAL at 23:32

## 2023-11-06 RX ADMIN — CEPHALEXIN 500 MG: 250 CAPSULE ORAL at 06:13

## 2023-11-06 RX ADMIN — PANTOPRAZOLE SODIUM 20 MG: 20 TABLET, DELAYED RELEASE ORAL at 06:13

## 2023-11-06 RX ADMIN — MECLIZINE 25 MG: 12.5 TABLET ORAL at 17:26

## 2023-11-06 RX ADMIN — FLUOXETINE 40 MG: 20 CAPSULE ORAL at 08:36

## 2023-11-06 NOTE — PROGRESS NOTES
11/06/23    Team Meeting   Meeting Type Daily Rounds   Team Members Present   Team Members Present Physician;Nurse;;Occupational Therapist   Physician Team Member Dr. Kip Velásquez MD; SHANI Herrera   Nursing Team Member Ovidio Gamez RN   Care Management Team Member MS Curly, [facility], [facility]   OT Team Member Justin Snyder [place]   Patient/Family Present   Patient Present No   Patient's Family Present No   [facility]. Appears flat and depressed, cooperative, pleasant endorses mild anxiety and depression. Command AH to cut wrists this am contracted for safety with staff, on keflex for uti.

## 2023-11-06 NOTE — OCCUPATIONAL THERAPY NOTE
Occupational Therapy Evaluation     Patient Name: Merlene Lanes CNSUT'A Date: 11/6/2023  Problem List  Principal Problem:    Major depression with psychotic features ([address])  Active Problems:    Primary hypertension    Hyperlipidemia    Class 3 severe obesity due to excess calories without serious comorbidity in [facility])    Chronic midline low back pain without sciatica    PTSD (post-traumatic stress disorder)    Vertigo    Seizures (HCC)    Insomnia    Anemia    Past Medical History  Past Medical History:   Diagnosis Date    Anxiety     Bipolar 1 disorder ([address])     Bipolar affective disorder, depressed, severe ([address]) 10/10/2021    Borderline personality disorder (HCC)     CAD (coronary artery disease)     Cognitive impairment     Depression     Dyslipidemia     GERD (gastroesophageal reflux disease)     Hypertension     Obesity     Psychiatric disorder     Psychiatric illness     PTSD (post-traumatic stress disorder)     Schizoaffective disorder (HCC)     Seizure ([address])      Past Surgical History  Past Surgical History:   Procedure Laterality Date    APPENDECTOMY      PATIENT DENIES HAVING APPENDIX REMOVED    CHOLECYSTECTOMY      FOOT SURGERY Right            11/06/23 1449   OT Last Visit   OT Visit Date 11/06/23   Note Type   Note type Evaluation   Pain Assessment   Pain Assessment Tool 0-10   Pain Score No Pain   Restrictions/Precautions   Weight Bearing Precautions Per Order No   Braces or Orthoses   (none reported)   Other Precautions Fall Risk;Pain  (obesity)   Home Living   Type of Home Group Home  [facility])   Home Layout Two level; Able to live on main level with bedroom/bathroom; Performs ADLs on one level  (1st floor set-up)   Bathroom Shower/Tub Walk-in shower   Bathroom Toilet Standard   Bathroom Accessibility Accessible   Home Equipment   (no AD usage at baseline; will utilize RW if dizzy)   Prior Function   Level of [place] Independent with functional mobility; Needs assistance with ADLs;Needs assistance with IADLS   Lives With   (staff at group home)   [facility] attendant  (staff at group home)   IADLs Family/Friend/Other provides transportation; Family/Friend/Other provides meals; Family/Friend/Other provides medication management   Falls in the last 6 months 0  (pt denies)   Subjective   Subjective "I have a hard time gripping the utensils when eating"   ADL   Where Assessed Chair   Eating Deficit   (Pt would benefit from trialed on built-up handles d/t decreased /strength)   UB Bathing Assistance 5  Supervision/Setup   LB Bathing Assistance 5  Supervision/Setup   UB Dressing Assistance 5  Supervision/Setup   LB Dressing Assistance 4  Minimal Assistance   LB Dressing Deficit Don/doff R sock; Don/doff L sock  (min A to doff/don bilateral socks - pt reports requiring assitance for threading BLE into underwear/pants)   Bed Mobility   Additional Comments DNT; pt seated in chair upon arrival and conclusion of session   Transfers   Sit to Stand 6  Modified independent   Additional items Increased time required   Stand to Sit 6  Modified independent   Additional items Increased time required   Additional Comments no device used   Functional Mobility   Functional Mobility 5  Supervision   Additional Comments Pt completed short distance ADL mobility around hallway at S level w/ no device.    Additional items   (none)   Balance   Static Sitting Normal   Dynamic Sitting Good   Static Standing Good   Dynamic Standing Fair +   Ambulatory Fair +   Activity Tolerance   Activity Tolerance Patient limited by fatigue   Nurse Made Aware Yes, nursing staff made aware of session outcomes   RUE Assessment   RUE Assessment WFL   RUE Strength   RUE Overall Strength   (3/5  strength)   LUE Assessment   LUE Assessment X  (90* AROM - full 180* with PROM)   LUE Strength   LUE Overall Strength   (3+/5  strength)   Hand Function   Gross Motor Coordination Functional   Fine Motor Coordination Impaired   Sensation   Light Touch No apparent deficits   Cognition   Overall Cognitive Status WFL   Arousal/Participation Alert; Responsive   Attention Within functional limits   Orientation Level Oriented X4   Memory Decreased recall of precautions;Decreased recall of biographical information   Following Commands Follows one step commands with increased time or repetition   Comments Pt agreeable to OT session   Assessment   Limitation Decreased ADL status; Decreased UE strength;Decreased Safe judgement during ADL;Decreased endurance;Decreased high-level ADLs; Decreased UE ROM   Prognosis Good   Assessment Pt is a 47 y.o. female seen for OT evaluation s/p admit to [facility]. Lu's on 11/3/2023 w/ Major depression with psychotic features ([address]). Comorbidities affecting pt's functional performance at time of assessment include: HTN, obesity, chronic midline low back pain, PTSD, vertigo, seizures, left leg pain. Personal factors affecting pt at time of IE include:behavioral pattern, difficulty performing ADLS, difficulty performing IADLS , limited insight into deficits, compliance, decreased initiation and engagement , and health management . Prior to admission, pt was I with ADLs and required A with IADLs. Upon evaluation: the following deficits impact occupational performance: weakness, decreased ROM, decreased strength, decreased balance, decreased tolerance, impaired problem solving, and decreased safety awareness. Pt to benefit from continued skilled OT tx while in the hospital to address deficits as defined above and maximize level of functional independence w ADL's and functional mobility. Occupational Performance areas to address include: grooming, bathing/shower, toilet hygiene, dressing, functional mobility, community mobility, and clothing management. From OT standpoint, recommendation at time of d/c would be home.    Goals   Patient Goals to feel better   Plan   Treatment Interventions ADL retraining;Functional transfer training;UE strengthening/ROM; Endurance training;Patient/family training;Energy conservation; Activityengagement   Goal Expiration Date 11/26/23   OT Treatment Day 0   OT Frequency   (2-5x)   Discharge Recommendation   Rehab Resource Intensity Level, OT III (Minimum Resource Intensity)   Additional Comments  The patient's raw score on the AM-PAC Daily Activity inpatient short form is 19, standardized score is 40.22, greater than 39.4. Patients at this level are likely to benefit from discharge to home. Please refer to the recommendation of the Occupational Therapist for safe discharge planning.    AM-PAC Daily Activity Inpatient   Lower Body Dressing 3   Bathing 3   Toileting 3   Upper Body Dressing 3   Grooming 3   Eating 4   Daily Activity Raw Score 19   Daily Activity Standardized Score (Calc for Raw Score >=11) 40.22   -PAC Applied Cognition Inpatient   Following a Speech/Presentation 3   Understanding Ordinary Conversation 4   Taking Medications 3   Remembering Where Things Are Placed or Put Away 3   Remembering List of 4-5 Errands 2   Taking Care of Complicated Tasks 2   Applied Cognition Raw Score 17   Applied Cognition Standardized Score 36.52     GOALS:    Pt will achieve the following within specified time frame: LTG  Pt will achieve the following goals within 20 days    *ADL transfers with (I) for inc'd independence with ADLs/purposeful tasks    *Self Feeding- (I) with built-up handles for inc'd independence with providing self nourishment    *UB ADL with (I) for inc'd independence with self cares    *LB ADL with (I) using AE prn for inc'd independence with self cares    *Toileting with (I) for clothing management and hygiene for return to PLOF with personal care    *Increase static stand balance and dyn stand balance to G for inc'd safety with standing purposeful tasks    *Increase stand tolerance x7 m for inc'd tolerance with standing purposeful tasks    *Bed mobility- (I) for inc'd independence to manage own comfort and initiate EOB & OOB purposeful tasks    *Participate in 10-15m UE therex to increase overall stamina/activity tolerance for purposeful tasks      Shantelle Solorzano MS, OTR/L

## 2023-11-06 NOTE — PROGRESS NOTES
Progress Note - Musa Campos 47 y.o. female MRN: [id number]    Unit/Bed#Howard Jaramillo 201-02 Encounter: [id number]        Subjective:   Patient seen and examined at bedside after reviewing the chart and discussing the case with the caring staff. Patient examined at bedside. Patient reports continued moderate degree of pain in bilateral shoulder and arms    On review of patient's labs it was found that patient's vitamin D level was low 17.0 and B12 level is also low 379. Physical Exam   Vitals: Blood pressure 115/64, pulse 74, temperature (!) 97.2 °F (36.2 °C), temperature source Temporal, resp. rate 16, height 5' 6" (1.676 m), weight 117 kg (258 lb), SpO2 96 %. ,Body mass index is 41.64 kg/m². Constitutional: He appears well-developed. HEENT: PERR, EOMI, MMM  Cardiovascular: Normal rate and regular rhythm. Pulmonary/Chest: Effort normal and breath sounds normal.   Abdomen: Soft, + BS, NT    Assessment/Plan:     1. Cardiac with hx of HTN, HLD. Continue Prazosin 2 mg nightly. Continue to monitor. 2.  GERD. Continue Protonix 20 mg daily. 3.  Constipation. Continue Senokot S 2 tablets daily at bedtime. 4.  Seizure disorder. Seizure precautions. Patient is on Depakote 1000 mg nightly. Reports last seizure was over 10 years ago. 5.  DJD/OA along with bilateral shoulder and arm pain. Tylenol as needed. And may get Voltaren gel 4 times daily over the shoulders and arm. 6.  Dry skin. Apply ammonium lactate to bilateral feet twice daily. 7.  Vertigo. Continue meclizine 25 mg twice daily. 8.  Intertrigo. Apply nystatin powder twice daily. 9.  Acute UTI. Patient will be continued on Keflex 500 mg q6h x 5 days. 10.  New vitamin D deficiency. I will put the patient on vitamin D bolus doses for 10 weeks followed by vitamin D3 1000 units daily. 11.  New vitamin B12 deficiency. Patient will be put on B12 supplements.

## 2023-11-06 NOTE — PROGRESS NOTES
Progress Note - Behavioral Health     Leydi Khan 47 y.o. female MRN: [id number]   Unit/Bed#: Matilda Nelson 201-02 Encounter: [id number]    Behavior over the last 24 hours: unchanged. Leydi seen today for continuing management. She states that she is feeling depressed and sad because she likes a yesenia in her group home, "but he doesn't like me in the same way." She states that she is also worried about finances and states that she only gets $21/week. She states that she has voices telling her to hurt herself by cutting her throat or wrists. She also believes that "everyone is out to get" her. She states that she feels safe here, but if she were to leave she would " front of a car" even though she "doesn't want to." She states that she is currently having SI, but denies any plan and contracted for safety. She endorses symptoms of decreased energy and difficulty concentrating. She states she is eating and sleeping well. Staff report she is social with staff and select peers and is pleasant and cooperative in groups and on the unit. Sleep: slept better  Appetite: normal  Medication side effects: Denies.    ROS: all other systems are negative    Mental Status Evaluation:    Appearance:  marginal hygiene, looks older than stated age, overweight   Behavior:  pleasant, cooperative, calm   Speech:  normal rate, normal volume   Mood:  "not too good"   Affect:  constricted   Thought Process:  concrete   Associations: concrete associations   Thought Content:  mild paranoia   Perceptual Disturbances: auditory hallucinations with commands to cut her wrists and throat   Risk Potential: Suicidal ideation - Yes, without plan, but would harm self if discharged  Homicidal ideation - None   Sensorium:  oriented to person, place, and time/date   Memory:  recent and remote memory grossly intact   Consciousness:  alert and awake   Attention: attention span and concentration are age appropriate   Insight:  limited   Judgment: limited Gait/Station: normal gait/station   Motor Activity: no abnormal movements     Vital signs in last 24 hours:    Temp:  [97.2 °F (36.2 °C)-99 °F (37.2 °C)] 97.2 °F (36.2 °C)  HR:  [70-80] 74  Resp:  [16-18] 16  BP: (110-132)/(55-66) 115/64    Laboratory results: I have personally reviewed all pertinent laboratory/tests results. Most Recent Labs:   Lab Results   Component Value Date    WBC 7.36 11/03/2023    RBC 3.82 11/03/2023    HGB 11.9 11/03/2023    HCT 36.5 11/03/2023     11/03/2023    RDW 14.5 11/03/2023    TOTANEUTABS 5.83 12/13/2022    NEUTROABS 4.33 11/03/2023    SODIUM 134 (L) 11/03/2023    K 4.2 11/03/2023    CL 97 11/03/2023    CO2 31 11/03/2023    BUN 13 11/03/2023    CREATININE 0.50 (L) 11/03/2023    GLUC 84 11/03/2023    GLUF 130 (H) 12/13/2022    CALCIUM 9.2 11/03/2023    AST 10 (L) 11/02/2023    ALT 10 11/02/2023    ALKPHOS 58 11/02/2023    TP 6.9 11/02/2023    ALB 4.1 11/02/2023    TBILI 0.34 11/02/2023    CHOLESTEROL 212 (H) 11/03/2023    HDL 42 (L) 11/03/2023    TRIG 223 (H) 11/03/2023    LDLCALC 125 (H) 11/03/2023    NONHDLC 170 11/03/2023    VALPROICTOT 13 (L) 08/10/2023    MWQ7GERNSKIG 3.882 11/02/2023    PREGUR negative 11/06/2022    PREGSERUM Negative 04/28/2022    RPR Non-Reactive 12/13/2022    HGBA1C 4.5 08/08/2023    EAG 82 08/08/2023       Assessment/Plan   Principal Problem:    Major depression with psychotic features ([address])  Active Problems:    Primary hypertension    Hyperlipidemia    Class 3 severe obesity due to excess calories without serious comorbidity in adult [facility])    Chronic midline low back pain without sciatica    PTSD (post-traumatic stress disorder)    Vertigo    Seizures (HCC)    Insomnia    Anemia    Recommended Treatment:     Planned medication and treatment changes:     All current active medications have been reviewed  Encourage group therapy, milieu therapy and occupational therapy  Behavioral Health checks every 7 minutes  Requires continued inpatient treatment due to chronic illness and high risk of decompensation if discharged before long term stability is achieved  Current Facility-Administered Medications   Medication Dose Route Frequency Provider Last Rate    acetaminophen  650 mg Oral Q4H PRN Sarah L Dagnall, PA-C      acetaminophen  650 mg Oral Q6H PRN Sarah L Dagnall, PA-C      acetaminophen  975 mg Oral Q6H PRN Sarah L Dagnall, PA-C      aluminum-magnesium hydroxide-simethicone  30 mL Oral Q4H PRN Robe Avendaño MD      ammonium lactate   Topical BID Sarah L Dagnall, PA-C      benztropine  1 mg Oral BID PRN Juan Daley MD      cephalexin  500 mg Oral Q6H [facility] Sarah L DagnallJO      [START ON 1/6/2024] cholecalciferol  1,000 Units Oral Daily Elisa Bowen MD      cyanocobalamin  500 mcg Oral Daily Elisa Bowen MD      divalproex sodium  1,000 mg Oral HS Juan Daley MD      ergocalciferol  50,000 Units Oral Weekly Elisa Bowen MD      FLUoxetine  40 mg Oral Daily Juan Daley MD      haloperidol lactate  5 mg Intramuscular Q4H PRN Max 4/day Robe Avendaño MD      hydrOXYzine HCL  50 mg Oral Q6H PRN Max 4/day Juan Daley MD      LORazepam  1 mg Intramuscular Q6H PRN Max 3/day Robe Avendaño MD      LORazepam  1 mg Oral Q8H PRN Juan Daley MD      meclizine  25 mg Oral BID Sarah L Dagnall, PA-C      melatonin  3 mg Oral HS Robe Avendaño MD      menthol-methyl salicylate   Apply externally 4x Daily PRN Sarah L Dagnall, PA-C      nicotine polacrilex  4 mg Oral Q2H PRN Robe Avendaño MD      nystatin   Topical BID Sarah L Dagnall, PA-C      pantoprazole  20 mg Oral Early Morning Sarah L Dagnall, PA-C      polyethylene glycol  17 g Oral Daily PRN Sarah L Dagnall, PA-C      prazosin  2 mg Oral HS Sarah L Dagnall, PA-C      risperiDONE  0.25 mg Oral Q4H PRN Max 6/day Robe Avendaño MD      risperiDONE  0.5 mg Oral Q4H PRN Max 3/day SHANI Pleitez      risperiDONE  1 mg Oral Q2H PRN Max 3/day Robe Avendaño MD risperiDONE  2 mg Oral BID SHANI Sibley      senna-docusate sodium  2 tablet Oral HS Sarah Mckay PA-C      traZODone  100 mg Oral HS Domonique Morelos MD         Risks / Benefits of Treatment:    Risks, benefits, and possible side effects of medications explained to patient and patient verbalizes understanding and agreement for treatment. Counseling / Coordination of Care:    Patient's progress discussed with staff in treatment team meeting. Medications, treatment progress and treatment plan reviewed with patient. Supportive therapy provided to patient. Group attendance encouraged.     Kodak Gunter MD 11/06/23

## 2023-11-06 NOTE — PHYSICAL THERAPY NOTE
PHYSICAL THERAPY TREATMENT NOTE  NAME:  Enriqueta Banks  DATE: 11/06/23    Length Of Stay: 3  Performed at least 2 patient identifiers during session: Name and ID bracelet    TREATMENT:      11/06/23 1326   PT Last Visit   PT Visit Date 11/06/23   Note Type   Note Type Treatment   Pain Assessment   Pain Assessment Tool 0-10   Pain Score No Pain   Restrictions/Precautions   Weight Bearing Precautions Per Order No   Other Precautions Fall Risk  (obesity)   General   Chart Reviewed Yes   Family/Caregiver Present No   Cognition   Overall Cognitive Status WFL   Arousal/Participation Alert; Responsive   Attention Within functional limits   Orientation Level Oriented X4   Memory Decreased recall of precautions;Decreased recall of biographical information   Following Commands Follows one step commands with increased time or repetition   Comments pt agreeable to bed level PT only   Subjective   Subjective "I'm not getting OOB, I'm too cold"   Endurance Deficit   Endurance Deficit Yes   Activity Tolerance   Activity Tolerance Patient limited by fatigue   Nurse Made Aware yes   Exercises   Quad Sets Supine;20 reps;AROM; Bilateral   Heelslides Supine;20 reps;AROM; Bilateral   Hip Abduction Supine;20 reps;AROM; Bilateral   Hip Adduction Supine;20 reps;AROM; Bilateral   Knee AROM Short Arc Quad Supine;20 reps;AROM; Bilateral   Ankle Pumps Supine;20 reps;AROM; Bilateral   Assessment   Prognosis Good   Problem List Decreased strength;Decreased range of motion;Decreased mobility; Decreased endurance;Obesity;Pain   Assessment Pt seen for PT treatment session this date with interventions consisting of Therapeutic exercise consisting of: AROM 20 reps B LE in supine position. Pt agreeable to PT treatment session upon arrival, pt found supine in bed w/ HOB elevated, in no apparent distress and responsive. In comparison to previous session, pt with no improvements as evidenced by pt declined OOB mobility .  Post session: pt returned BTB, all needs in reach, and RN notified of session findings/recommendations. Continue to recommend Level III (Minimum Resource Intensity) at time of d/c in order to maximize pt's functional independence and safety w/ mobility. Pt continues to be functioning below baseline level. PT will continue to see pt during current hospitalization in order to address the deficits listed above and provide interventions consistent w/ POC in effort to achieve STGs. Leydi Clayton PTA   Barriers to Discharge None   Goals   STG Expiration Date 11/25/23   PT Treatment Day 1   Plan   Treatment/Interventions Functional transfer training;LE strengthening/ROM; Therapeutic exercise; Endurance training;Bed mobility;Gait training; Compensatory technique education   PT Frequency 2-3x/wk   Discharge Recommendation   Rehab Resource Intensity Level, PT III (Minimum Resource Intensity)   AM-PAC Basic Mobility Inpatient   Turning in Flat Bed Without Bedrails 4   Lying on Back to Sitting on Edge of Flat Bed Without Bedrails 4   Moving Bed to Chair 4   Standing Up From Chair Using Arms 4   Walk in Room 3   Climb 3-5 Stairs With Railing 2   Basic Mobility Inpatient Raw Score 21   Basic Mobility Standardized Score 45.55   Highest Level Of Mobility   JH-HLM Goal 6: Walk 10 steps or more   JH-HLM Achieved 7: Walk 25 feet or more   End of Consult   Patient Position at End of Consult Supine; All needs within reach         The patient's AM-PAC Basic Mobility Inpatient Short Form Raw Score is 21. A Raw score of greater than 16 suggests the patient may benefit from discharge to home. Please also refer to the recommendation of the Physical Therapist for safe discharge planning.       Leonardo Portillo PTA,PTA

## 2023-11-06 NOTE — PLAN OF CARE
Problem: PHYSICAL THERAPY ADULT  Goal: Performs mobility at highest level of function for planned discharge setting. See evaluation for individualized goals. Description: Treatment/Interventions: LE strengthening/ROM, Therapeutic exercise, Endurance training, Patient/family training, Equipment eval/education, Gait training, Spoke to nursing  Equipment Recommended:  (none at this time; TBD)       See flowsheet documentation for full assessment, interventions and recommendations. Outcome: Not Progressing  Note: Prognosis: Good  Problem List: Decreased strength, Decreased range of motion, Decreased mobility, Decreased endurance, Obesity, Pain  Assessment: Pt seen for PT treatment session this date with interventions consisting of Therapeutic exercise consisting of: AROM 20 reps B LE in supine position. Pt agreeable to PT treatment session upon arrival, pt found supine in bed w/ HOB elevated, in no apparent distress and responsive. In comparison to previous session, pt with no improvements as evidenced by pt declined OOB mobility . Post session: pt returned BTB, all needs in reach, and RN notified of session findings/recommendations. Continue to recommend Level III (Minimum Resource Intensity) at time of d/c in order to maximize pt's functional independence and safety w/ mobility. Pt continues to be functioning below baseline level. PT will continue to see pt during current hospitalization in order to address the deficits listed above and provide interventions consistent w/ POC in effort to achieve STGs. Leydi Randolph, PTA  Barriers to Discharge: None     Rehab Resource Intensity Level, PT: III (Minimum Resource Intensity)    See flowsheet documentation for full assessment.

## 2023-11-06 NOTE — ED NOTES
COORDINATION OF BENEFITS:   Phone call placed to [facility]. Phone number: [phone]. Spoke to [employer]. Level of care: [address]      Care Manager of [facility] to follow up with RENUKA Luna @ discharge to [phone]

## 2023-11-06 NOTE — MALNUTRITION/BMI
This medical record reflects one or more clinical indicators suggestive of malnutrition and/or morbid obesity. BMI Findings:  Adult BMI Classifications: Morbid Obesity 40-44.9     Energy intake > energy output over time. Body mass index is 41.64 kg/m². See Nutrition note dated 11/6/2023 for additional details. Completed nutrition assessment is viewable in the nutrition documentation.

## 2023-11-06 NOTE — NUTRITION
23 1312   Biochemical Data,Medical Tests, and Procedures   Biochemical Data/Medical Tests/Procedures Lab values reviewed; Meds reviewed   Labs (Comment) 11/3 Na:134, creat:0.50, CHOL:212, TRI, HDL:42, LDL:125, Vit D:17.0, CBC WNL   Meds (Comment) cogentin, keflex, Vit D, Vit B12, haldol, ativan, melatonin, antivert, protonix, minipress, risperdal, senna, desyrel   Nutrition-Focused Physical Exam   Nutrition-Focused Physical Exam Findings RN skin assessment reviewed; No skin issues documented   Nutrition-Focused Physical Exam Findings Nonpitting BUE edema. LBM . Medical-Related Concerns anxiety, bipolar 1 disorder, borderline personality disorder, CAD, cognitive impairment, depression, dyslipidemia, GERD, HTN, obesity, psychiatric illness, PTSD, schizoaffective disorder, seizure   Adequacy of Intake   Nutrition Modality PO   Feeding Route   PO Independent   Current PO Intake   Current Diet Order Regular diet thin liquids   Current Meal Intake %   Estimated calorie intake compared to estimated need Nutrient needs met   PES Statement   Problem Clinical   Weight (3) Overweight/obesity NC-3.3   Overweight/ obesity specific to Obese, Class III (5)   Related to Energy intake>energy output over time   As evidenced by: BMI   Recommendations/Interventions   Malnutrition/BMI Present Yes  (energy intake > energy output over time.)   Adult BMI Classifications Morbid Obesity 40-44.9   Summary High BMI. Regular diet thin liquids. Meal completions 100%. Per notes is from "[employer]". She reports good appetite. She states she is provided 3 meals and snacks daily at her facility. She states she does not follow any diet plan. Patient requesting double portions however explained it is not warranted at this time and would only promote weight gain. Patient agreeable to continue diet as prescribed. 11/3/#; #; 4/10/#; #.  Weight appears to fluctuate, will monitor weight status. Nonpitting BUE edema. LBM 11/4. Continue diet as prescribed.    Interventions/Recommendations Continue current diet order   Education Assessment   Education Patient/caregiver not appropriate for education at this time   Patient Nutrition Goals   Goal Avoid weight gain   Goal Status Initiated   Timeframe to complete goal by next f/u   Nutrition Complexity Risk   Nutrition complexity level Low risk   Follow up date 11/20/23

## 2023-11-06 NOTE — PLAN OF CARE
Problem: OCCUPATIONAL THERAPY ADULT  Goal: Performs self-care activities at highest level of function for planned discharge setting. See evaluation for individualized goals. Description: Treatment Interventions: ADL retraining, Functional transfer training, UE strengthening/ROM, Endurance training, Patient/family training, Energy conservation, Activityengagement     See flowsheet documentation for full assessment, interventions and recommendations. Note: Limitation: Decreased ADL status, Decreased UE strength, Decreased Safe judgement during ADL, Decreased endurance, Decreased high-level ADLs, Decreased UE ROM  Prognosis: Good  Assessment: Pt is a 47 y.o. female seen for OT evaluation s/p admit to [facility] on 11/3/2023 w/ Major depression with psychotic features ([address] Comorbidities affecting pt's functional performance at time of assessment include: HTN, obesity, chronic midline low back pain, PTSD, vertigo, seizures, left leg pain. Personal factors affecting pt at time of IE include:behavioral pattern, difficulty performing ADLS, difficulty performing IADLS , limited insight into deficits, compliance, decreased initiation and engagement , and health management . Prior to admission, pt was I with ADLs and required A with IADLs. Upon evaluation: the following deficits impact occupational performance: weakness, decreased ROM, decreased strength, decreased balance, decreased tolerance, impaired problem solving, and decreased safety awareness. Pt to benefit from continued skilled OT tx while in the hospital to address deficits as defined above and maximize level of functional independence w ADL's and functional mobility. Occupational Performance areas to address include: grooming, bathing/shower, toilet hygiene, dressing, functional mobility, community mobility, and clothing management. From OT standpoint, recommendation at time of d/c would be home.      Rehab Resource Intensity Level, OT: III (Minimum Resource Intensity)     Glen Rapp MS, OTR/L

## 2023-11-06 NOTE — NURSING NOTE
Patient visible on the unit. Withdrawn to self. Appears flat and depressed, cooperative with assessment. Denies SI, HI, Hallucinations. Anxiety and depression mild to moderate throughout the day. Compliant with medications. Continuous rounding maintained.

## 2023-11-06 NOTE — NURSING NOTE
Pt was withdrawn to self. Pt is calm and cooperative. Pt is medication compliant. Pt endorses mild depression and CAH to cut her wrists. Pt denies SI/HI. Pt states she feels safe on the unit and would come to staff if at any point she feels unsafe. Pt ambulates independently in the milieu. Q 7 minute safety checks were maintained.

## 2023-11-07 PROCEDURE — 99233 SBSQ HOSP IP/OBS HIGH 50: CPT | Performed by: PSYCHIATRY & NEUROLOGY

## 2023-11-07 RX ORDER — FLUOXETINE HYDROCHLORIDE 20 MG/1
60 CAPSULE ORAL DAILY
Status: DISCONTINUED | OUTPATIENT
Start: 2023-11-08 | End: 2023-11-16 | Stop reason: HOSPADM

## 2023-11-07 RX ADMIN — SENNOSIDES AND DOCUSATE SODIUM 2 TABLET: 8.6; 5 TABLET ORAL at 21:31

## 2023-11-07 RX ADMIN — CEPHALEXIN 500 MG: 250 CAPSULE ORAL at 05:48

## 2023-11-07 RX ADMIN — DIVALPROEX SODIUM 1000 MG: 500 TABLET, FILM COATED, EXTENDED RELEASE ORAL at 21:31

## 2023-11-07 RX ADMIN — PRAZOSIN HYDROCHLORIDE 2 MG: 1 CAPSULE ORAL at 21:31

## 2023-11-07 RX ADMIN — HYDROXYZINE HYDROCHLORIDE 50 MG: 50 TABLET, FILM COATED ORAL at 19:46

## 2023-11-07 RX ADMIN — TRAZODONE HYDROCHLORIDE 100 MG: 100 TABLET ORAL at 21:31

## 2023-11-07 RX ADMIN — CEPHALEXIN 500 MG: 250 CAPSULE ORAL at 17:10

## 2023-11-07 RX ADMIN — MECLIZINE 25 MG: 12.5 TABLET ORAL at 17:10

## 2023-11-07 RX ADMIN — RISPERIDONE 2 MG: 2 TABLET ORAL at 08:28

## 2023-11-07 RX ADMIN — Medication 3 MG: at 21:31

## 2023-11-07 RX ADMIN — MECLIZINE 25 MG: 12.5 TABLET ORAL at 08:27

## 2023-11-07 RX ADMIN — DICLOFENAC SODIUM 4 G: 10 GEL TOPICAL at 21:35

## 2023-11-07 RX ADMIN — RISPERIDONE 2 MG: 2 TABLET ORAL at 17:09

## 2023-11-07 RX ADMIN — CEPHALEXIN 500 MG: 250 CAPSULE ORAL at 23:01

## 2023-11-07 RX ADMIN — CYANOCOBALAMIN TAB 500 MCG 500 MCG: 500 TAB at 08:27

## 2023-11-07 RX ADMIN — RISPERIDONE 1 MG: 1 TABLET ORAL at 19:46

## 2023-11-07 RX ADMIN — FLUOXETINE 40 MG: 20 CAPSULE ORAL at 08:27

## 2023-11-07 RX ADMIN — CEPHALEXIN 500 MG: 250 CAPSULE ORAL at 12:30

## 2023-11-07 RX ADMIN — PANTOPRAZOLE SODIUM 20 MG: 20 TABLET, DELAYED RELEASE ORAL at 05:48

## 2023-11-07 NOTE — NURSING NOTE
Patient noted to increasingly agitated and anxious evidenced by increased restlessness and hitting self in the head. Utilized prn Ativan 1 mg for bhakta anxiety score of 27 and Risperdal 0.5 mg for moderate agitation. Will monitor and follow up for effectiveness.

## 2023-11-07 NOTE — SOCIAL WORK
ROSE received voicemail from Sherrell with [employer] to update her on PT status and plan of care, Sherrell in agreement with all. Sherrell reflected that they do not do discharges on Fridays to keep this in mind when d/c is being planned. CM in agreement. Call ended mutually. [address]

## 2023-11-07 NOTE — PROGRESS NOTES
Progress Note - Behavioral Health     Leydi Khan 47 y.o. female MRN: [id number]   Unit/Bed#: [facility]ie [facility] 201-02 Encounter: [id number]    Behavior over the last 24 hours: regressed. Leydi was seen today for continuing management. She states that she is not feeling well and ruminates about her low self images. She states that she is also worried voices telling her to hurt herself, verbalizes having SI but feels safe here and contracted for safety. She endorses symptoms of decreased energy and difficulty concentrating. She states she is eating and sleeping well. Staff report she is social with staff and select peers and is pleasant and cooperative in groups and on the unit. Sleep:  improving  Appetite: normal  Medication side effects: Denies. ROS: all other systems are negative    Mental Status Evaluation:    Appearance:  age appropriate, overweight   Behavior:  restless   Speech:  normal rate, normal volume   Mood:  "not too good"   Affect:  constricted   Thought Process:  concrete   Associations: concrete associations   Thought Content:  mild paranoia   Perceptual Disturbances: auditory hallucinations with commands to cut her wrists and throat   Risk Potential: Suicidal ideation - Yes, without plan, but would harm self if discharged  Homicidal ideation - None   Sensorium:  oriented to person, place, and time/date   Memory:  recent and remote memory grossly intact   Consciousness:  alert and awake   Attention: attention span and concentration are age appropriate   Insight:  limited   Judgment: limited   Gait/Station: normal gait/station   Motor Activity: no abnormal movements     Vital signs in last 24 hours:    Temp:  [97.5 °F (36.4 °C)-97.8 °F (36.6 °C)] 97.7 °F (36.5 °C)  HR:  [74-78] 78  Resp:  [18] 18  BP: (126-167)/(69-75) 167/69    Laboratory results: I have personally reviewed all pertinent laboratory/tests results.   Most Recent Labs:   Lab Results   Component Value Date    WBC 7.36 11/03/2023    RBC 3.82 11/03/2023    HGB 11.9 11/03/2023    HCT 36.5 11/03/2023     11/03/2023    RDW 14.5 11/03/2023    TOTANEUTABS 5.83 12/13/2022    NEUTROABS 4.33 11/03/2023    SODIUM 134 (L) 11/03/2023    K 4.2 11/03/2023    CL 97 11/03/2023    CO2 31 11/03/2023    BUN 13 11/03/2023    CREATININE 0.50 (L) 11/03/2023    GLUC 84 11/03/2023    GLUF 130 (H) 12/13/2022    CALCIUM 9.2 11/03/2023    AST 10 (L) 11/02/2023    ALT 10 11/02/2023    ALKPHOS 58 11/02/2023    TP 6.9 11/02/2023    ALB 4.1 11/02/2023    TBILI 0.34 11/02/2023    CHOLESTEROL 212 (H) 11/03/2023    HDL 42 (L) 11/03/2023    TRIG 223 (H) 11/03/2023    LDLCALC 125 (H) 11/03/2023    [address] 170 11/03/2023    VALPROICTOT 13 (L) 08/10/2023    KGO5ARIYNUBR 3.882 11/02/2023    PREGUR negative 11/06/2022    PREGSERUM Negative 04/28/2022    RPR Non-Reactive 12/13/2022    HGBA1C 4.5 08/08/2023    EAG 82 08/08/2023       Assessment/Plan   Principal Problem:    Major depression with psychotic features ([address])  Active Problems:    Primary hypertension    Hyperlipidemia    Class 3 severe obesity due to excess calories without serious comorbidity in [facility])    Chronic midline low back pain without sciatica    PTSD (post-traumatic stress disorder)    Vertigo    Seizures (HCC)    Insomnia    Anemia    Recommended Treatment:     Planned medication and treatment changes:     All current active medications have been reviewed  Encourage group therapy, milieu therapy and occupational therapy  Behavioral Health checks every 7 minutes  Increase Prozac to 60 mg po daily    Current Facility-Administered Medications   Medication Dose Route Frequency Provider Last Rate    acetaminophen  650 mg Oral Q4H PRN Sarah Mckay PA-C      acetaminophen  650 mg Oral Q6H PRN JULIA TsangC      acetaminophen  975 mg Oral Q6H PRN Sarah Mckay PA-C      aluminum-magnesium hydroxide-simethicone  30 mL Oral Q4H PRN Lulu Essex, MD      ammonium lactate   Topical BID Sarah SAENZ Woody, PA-C      benztropine  1 mg Oral BID PRN Carlo Jimenez MD      cephalexin  500 mg Oral Q6H [address] Sarah L Stoneynall, PANormaC      [START ON 1/6/2024] cholecalciferol  1,000 Units Oral Daily Carroll Rapp MD      cyanocobalamin  500 mcg Oral Daily Carroll Rapp MD      Diclofenac Sodium  4 g Topical 4x Daily Carroll Rapp MD      divalproex sodium  1,000 mg Oral HS Carlo Jimenez MD      ergocalciferol  50,000 Units Oral Weekly Carroll Rapp MD      FLUoxetine  40 mg Oral Daily Carlo Jimenez MD      haloperidol lactate  5 mg Intramuscular Q4H PRN Max 4/day Orin Mitchell MD      hydrOXYzine HCL  50 mg Oral Q6H PRN Max 4/day Carlo Jimenez MD      LORazepam  1 mg Intramuscular Q6H PRN Max 3/day Orin Mitchell MD      LORazepam  1 mg Oral Q8H PRN Carlo Jimenez MD      meclizine  25 mg Oral BID Sarah L Stoneynall, PA-C      melatonin  3 mg Oral HS Orin Mitchell MD      nicotine polacrilex  4 mg Oral Q2H PRN Orin Mitchell MD      nystatin   Topical BID Sarah L Dagnall, PA-C      pantoprazole  20 mg Oral Early Morning Sarah L Dagnall, PA-C      polyethylene glycol  17 g Oral Daily PRN Sarah L Dagnall, PA-C      prazosin  2 mg Oral HS Sarah L Stoneynall, PA-C      risperiDONE  0.25 mg Oral Q4H PRN Max 6/day Orin Mitchell MD      risperiDONE  0.5 mg Oral Q4H PRN Max 3/day SHANI Zavaleta      risperiDONE  1 mg Oral Q2H PRN Max 3/day Orin Mitchell MD      risperiDONE  2 mg Oral BID SHANI Zavaleta      senna-docusate sodium  2 tablet Oral HS Sarah L Dagnall, PA-C      traZODone  100 mg Oral HS Carlo Jimenez MD         Risks / Benefits of Treatment:    Risks, benefits, and possible side effects of medications explained to patient and patient verbalizes understanding and agreement for treatment. Counseling / Coordination of Care:    Patient's progress discussed with staff in treatment team meeting. Medications, treatment progress and treatment plan reviewed with patient.   Supportive therapy provided to patient. Group attendance encouraged.     Elise Burns MD 11/07/23

## 2023-11-07 NOTE — PROGRESS NOTES
Progress Note - Graeme Holt 47 y.o. female MRN: [id number]    Unit/Bed#Matilda Nelson 201-02 Encounter: [id number]        Subjective:   Patient seen and examined at bedside after reviewing the chart and discussing the case with the caring staff. Patient examined at bedside. Patient reports no acute symptoms. On review of patient's labs it was found that patient's vitamin D level was low 17.0 and B12 level is also low 379. Physical Exam   Vitals: Blood pressure 167/69, pulse 78, temperature 97.7 °F (36.5 °C), temperature source Temporal, resp. rate 18, height 5' 6" (1.676 m), weight 117 kg (258 lb), SpO2 98 %. ,Body mass index is 41.64 kg/m². Constitutional: He appears well-developed. HEENT: PERR, EOMI, MMM  Cardiovascular: Normal rate and regular rhythm. Pulmonary/Chest: Effort normal and breath sounds normal.   Abdomen: Soft, + BS, NT    Assessment/Plan:     1. Cardiac with hx of HTN, HLD. Continue Prazosin 2 mg nightly. Continue to monitor. 2.  GERD. Continue Protonix 20 mg daily. 3.  Constipation. Continue Senokot S 2 tablets daily at bedtime. 4.  Seizure disorder. Seizure precautions. Patient is on Depakote 1000 mg nightly. Reports last seizure was over 10 years ago. 5.  DJD/OA along with bilateral shoulder and arm pain. Tylenol as needed. And may get Voltaren gel 4 times daily over the shoulders and arm. 6.  Dry skin. Apply ammonium lactate to bilateral feet twice daily. 7.  Vertigo. Continue meclizine 25 mg twice daily. 8.  Intertrigo. Apply nystatin powder twice daily. 9.  Acute UTI. Patient will be continued on Keflex 500 mg q6h x 5 days. Finishing up today 11/7/2023. 10.  New vitamin D deficiency. I will put the patient on vitamin D bolus doses for 10 weeks followed by vitamin D3 1000 units daily. 11.  New vitamin B12 deficiency. Patient will be put on B12 supplements.

## 2023-11-07 NOTE — NURSING NOTE
Patient visible in milieu, mood and affect labile, irritable edge. Patient endorsed high anxiety/depression, unable to identify cause. Denies HI, hallucinations. Patient did state she wants to harm herself by hitting her head. "These thoughts are in my own mind". Noted patient to be writing on self, paper, and clothing about wanting to harm self and writing derogatory comments about herself. No evidence of hitting head visualized by staff. Patient out in milieu in staff presence. MD aware of behaviors, no new orders at this time. No noted erythema, edema, ecchymosis to head. Attended groups x 2 thus far. Remains medication compliant and on 7" checks for safety and behaviors.

## 2023-11-07 NOTE — NURSING NOTE
Patient noted to be visible and social w/ select peer on the milieu. Denied Hallucinations and HI during interaction. Endorsed fleeting SI, moderate to severe anxiety and depression. Patient states she feels safe on the unit and will make staff aware otherwise. Affect flat. Utilized prn's for severe anxiety and moderate agitation. Patient reported prn's given were ineffective but observed by this writer to be more calm w/ no signs of agitation an hour s/p. Offers no further complaints at this time. Resting comfortably w/ no signs of discomfort nor distress. Q 7 min safety checks continuous and maintained.

## 2023-11-07 NOTE — NURSING NOTE
Prn Ativan 1 mg given for anxiety and Risperdal 0.5 mg for agitation reported by patient as ineffective. Patient observed by this writer to be visibly more calm w/ no signs of agitation.

## 2023-11-07 NOTE — PLAN OF CARE
Problem: SELF HARM/SUICIDALITY  Goal: Will have no self-injury during hospital stay  Description: INTERVENTIONS:  - Q 15 MINUTES: Routine safety checks  - Q WAKING SHIFT & PRN: Assess risk to determine if routine checks are adequate to maintain patient safety  - Encourage patient to participate actively in care by formulating a plan to combat response to suicidal ideation, identify supports and resources  Outcome: Progressing     Problem: DEPRESSION  Goal: Will be euthymic at discharge  Description: INTERVENTIONS:  - Administer medication as ordered  - Provide emotional support via 1:1 interaction with staff  - Encourage involvement in milieu/groups/activities  - Monitor for social isolation  Outcome: Progressing     Problem: Alteration in Thoughts and Perception  Goal: Verbalize thoughts and feelings  Description: Interventions:  - Promote a nonjudgmental and trusting relationship with the patient through active listening and therapeutic communication  - Assess patient's level of functioning, behavior and potential for risk  - Engage patient in 1 on 1 interactions  - Encourage patient to express fears, feelings, frustrations, and discuss symptoms    - Hasty patient to reality, help patient recognize reality-based thinking   - Administer medications as ordered and assess for potential side effects  - Provide the patient education related to the signs and symptoms of the illness and desired effects of prescribed medications  Outcome: Progressing

## 2023-11-07 NOTE — PROGRESS NOTES
11/07/23    Team Meeting   Meeting Type Daily Rounds   Team Members Present   Team Members Present Physician;Nurse;;Occupational Therapist   Physician Team Member Dr. Jesus Dudley MD; SHANI Cervantes   Nursing Team Member Shiraz Vela RN   Care Management Team Member MS Curly, [facility]   OT Team Member Monica Cruz [place]   Patient/Family Present   Patient Present No   Patient's Family Present No   Will return to [address],9D and follow up with new vitae. Slept, endorses high anxiety and depression. PT reporting want to hurt self contracts for safety on unit per nursing. Denies avh. On keflex for uti. Prns. PT was hitting self last night.

## 2023-11-07 NOTE — NURSING NOTE
No further behaviors noted. Currently watching TV with peers. Mood pleasant and cooperative in interaction. Remains on 7" checks for safety and behaviors.

## 2023-11-08 PROCEDURE — 99232 SBSQ HOSP IP/OBS MODERATE 35: CPT | Performed by: PSYCHIATRY & NEUROLOGY

## 2023-11-08 RX ADMIN — RISPERIDONE 1 MG: 1 TABLET ORAL at 20:21

## 2023-11-08 RX ADMIN — PRAZOSIN HYDROCHLORIDE 2 MG: 1 CAPSULE ORAL at 21:08

## 2023-11-08 RX ADMIN — MECLIZINE 25 MG: 12.5 TABLET ORAL at 09:14

## 2023-11-08 RX ADMIN — TRAZODONE HYDROCHLORIDE 100 MG: 100 TABLET ORAL at 21:08

## 2023-11-08 RX ADMIN — Medication: at 22:01

## 2023-11-08 RX ADMIN — CYANOCOBALAMIN TAB 500 MCG 500 MCG: 500 TAB at 09:14

## 2023-11-08 RX ADMIN — SENNOSIDES AND DOCUSATE SODIUM 2 TABLET: 8.6; 5 TABLET ORAL at 21:08

## 2023-11-08 RX ADMIN — FLUOXETINE 60 MG: 20 CAPSULE ORAL at 09:14

## 2023-11-08 RX ADMIN — RISPERIDONE 2 MG: 2 TABLET ORAL at 16:23

## 2023-11-08 RX ADMIN — DICLOFENAC SODIUM 4 G: 10 GEL TOPICAL at 21:08

## 2023-11-08 RX ADMIN — MECLIZINE 25 MG: 12.5 TABLET ORAL at 16:23

## 2023-11-08 RX ADMIN — DIVALPROEX SODIUM 1000 MG: 500 TABLET, FILM COATED, EXTENDED RELEASE ORAL at 21:08

## 2023-11-08 RX ADMIN — Medication 3 MG: at 21:08

## 2023-11-08 RX ADMIN — RISPERIDONE 2 MG: 2 TABLET ORAL at 09:15

## 2023-11-08 NOTE — PROGRESS NOTES
11/08/23     Team Meeting   Meeting Type Daily Rounds   Team Members Present   Team Members Present Physician;Nurse;;Occupational Therapist   Physician Team Member Dr. [facility], MD; Ever Orellana, [address] [place] [address]   Nursing Team Member Ela Maguire RN   Care Management Team Member MS Curly, [facility] [place], [facility]   OT Team Member Collette Mate, [place]   Patient/Family Present   Patient Present No   Patient's Family Present No   Slept, denies all today, yesterday pt wrote on self thoughts to harm self etc. Safety measures to be implemented, prns. Will return to [facility] and continue with new [facility] for psych when stable.

## 2023-11-08 NOTE — NURSING NOTE
Prn Atarax 50 mg given for anxiety and Risperdal 1 mg for agitation reported by patient as effective.

## 2023-11-08 NOTE — PROGRESS NOTES
Progress Note - Behavioral Health     Leydi Khan 47 y.o. female MRN: [id number]   Unit/Bed#: [facility] 208-01 Encounter: [id number]    Behavior over the last 24 hours: minimal improvement. Leydi was seen today for continuing care. She states feeling tired, depressed and worries about her current urinary symptoms. Continues experiencing "derogatory voices telling her to bad things", having SI but she is able to contracted for safety. Admits episodes of high anxiety and takes Atarax prn which has been partially effective. She states she is eating and sleeping ok. Staff report pt is withdrawn today with limited interaction in groups and on the unit. Sleep:  slept better  Appetite: normal  Medication side effects: Denies. ROS: all other systems are negative    Mental Status Evaluation:    Appearance:  age appropriate, overweight   Behavior:  guarded, responds to redirection   Speech:  normal rate, normal volume   Mood:  "tired"   Affect:  blunted   Thought Process:  concrete   Associations: concrete associations   Thought Content:  mild paranoia   Perceptual Disturbances: auditory hallucinations with derogatory comments   Risk Potential: Suicidal ideation - Yes, without plan, but would harm self if discharged  Homicidal ideation - None   Sensorium:  oriented to person, place, and time/date   Memory:  recent and remote memory grossly intact   Consciousness:  alert and awake   Attention: attention span and concentration are age appropriate   Insight:  limited   Judgment: limited   Gait/Station: normal gait/station   Motor Activity: no abnormal movements     Vital signs in last 24 hours:    Temp:  [97.9 °F (36.6 °C)-98.6 °F (37 °C)] 98.3 °F (36.8 °C)  HR:  [78-82] 82  Resp:  [18] 18  BP: (120-136)/(53-71) 136/58    Laboratory results: I have personally reviewed all pertinent laboratory/tests results.   Most Recent Labs:   Lab Results   Component Value Date    WBC 7.36 11/03/2023    RBC 3.82 11/03/2023    HGB 11.9 11/03/2023    HCT 36.5 11/03/2023     11/03/2023    RDW 14.5 11/03/2023    TOTANEUTABS 5.83 12/13/2022    NEUTROABS 4.33 11/03/2023    SODIUM 134 (L) 11/03/2023    K 4.2 11/03/2023    CL 97 11/03/2023    CO2 31 11/03/2023    BUN 13 11/03/2023    CREATININE 0.50 (L) 11/03/2023    GLUC 84 11/03/2023    GLUF 130 (H) 12/13/2022    CALCIUM 9.2 11/03/2023    AST 10 (L) 11/02/2023    ALT 10 11/02/2023    ALKPHOS 58 11/02/2023    TP 6.9 11/02/2023    ALB 4.1 11/02/2023    TBILI 0.34 11/02/2023    CHOLESTEROL 212 (H) 11/03/2023    HDL 42 (L) 11/03/2023    TRIG 223 (H) 11/03/2023    LDLCALC 125 (H) 11/03/2023    [address] 170 11/03/2023    VALPROICTOT 13 (L) 08/10/2023    [id number] 3.882 11/02/2023    PREGUR negative 11/06/2022    PREGSERUM Negative 04/28/2022    RPR Non-Reactive 12/13/2022    HGBA1C 4.5 08/08/2023    EAG 82 08/08/2023       Assessment/Plan   Principal Problem:    Major depression with psychotic features ([address])  Active Problems:    Primary hypertension    Hyperlipidemia    Class 3 severe obesity due to excess calories without serious comorbidity in adult [facility])    Chronic midline low back pain without sciatica    PTSD (post-traumatic stress disorder)    Vertigo    Seizures (HCC)    Insomnia    Anemia    Recommended Treatment:     Planned medication and treatment changes:     All current active medications have been reviewed  Encourage group therapy, milieu therapy and occupational therapy  Behavioral Health checks every 7 minutes  Requires continued inpatient treatment due to chronic illness and high risk of decompensation if discharged before long term stability is achieved     Current Facility-Administered Medications   Medication Dose Route Frequency Provider Last Rate    acetaminophen  650 mg Oral Q4H PRN Sarah Mckay PA-C      acetaminophen  650 mg Oral Q6H PRN Sarah Mckay PA-C      acetaminophen  975 mg Oral Q6H PRN Sarah Mckay PA-C      aluminum-magnesium hydroxide-simethicone 30 mL Oral Q4H PRN Ana Bustos, MD      ammonium lactate   Topical BID Sarah L Dagtamir, PA-C      benztropine  1 mg Oral BID PRN Petey Atkinson, MD Lonny New ON 1/6/2024] cholecalciferol  1,000 Units Oral Daily Tamara Castaneda, MD      cyanocobalamin  500 mcg Oral Daily Tamara Castaneda, MD      Diclofenac Sodium  4 g Topical 4x Daily Tamara Castaneda, MD      divalproex sodium  1,000 mg Oral HS Petey Alvarez, MD      ergocalciferol  50,000 Units Oral Weekly Tamara Castaneda, MD      FLUoxetine  60 mg Oral Daily Petey Atkinson, MD      haloperidol lactate  5 mg Intramuscular Q4H PRN Max 4/day Ana Bustos, MD      hydrOXYzine HCL  50 mg Oral Q6H PRN Max 4/day Petey Atkinson, MD      LORazepam  1 mg Intramuscular Q6H PRN Max 3/day Ana Bustos, MD      LORazepam  1 mg Oral Q8H PRN Petey Atkinson, MD      meclizine  25 mg Oral BID Sarah L Woody, PA-C      melatonin  3 mg Oral HS Ana Bustos, MD      nicotine polacrilex  4 mg Oral Q2H PRN Ana Bustos, MD      nystatin   Topical BID Sarah L Dagnall, PA-C      pantoprazole  20 mg Oral Early Morning Sarah L Dagnall, PA-C      polyethylene glycol  17 g Oral Daily PRN Sarah L Dagnall, PA-C      prazosin  2 mg Oral HS Sarah L Roel, PA-C      risperiDONE  0.25 mg Oral Q4H PRN Max 6/day Ana Bustos, MD      risperiDONE  0.5 mg Oral Q4H PRN Max 3/day SHANI Coleman      risperiDONE  1 mg Oral Q2H PRN Max 3/day Ana Bustos, MD      risperiDONE  2 mg Oral BID SHANI Coleman      senna-docusate sodium  2 tablet Oral HS Sarah L Roel, PA-C      traZODone  100 mg Oral HS Petey Alvarez MD         Risks / Benefits of Treatment:    Risks, benefits, and possible side effects of medications explained to patient and patient verbalizes understanding and agreement for treatment. Counseling / Coordination of Care:    Patient's progress discussed with staff in treatment team meeting.   Medications, treatment progress and treatment plan reviewed with patient. Supportive therapy provided to patient. Group attendance encouraged.     Ricardo Smith MD 11/08/23

## 2023-11-08 NOTE — NURSING NOTE
Patient noted to increasingly agitated and anxious r/t to increased unit stimulation. Utilized prn Atarax 50 mg for bhakta anxiety score of 18 and Risperdal 1 mg for severe agitation. Will monitor and follow up for effectiveness.

## 2023-11-08 NOTE — SOCIAL WORK
CM met with PT for PT check in. PT was pleasant in conversation, denies si/hi/vh/ah, PT reported thoughts to hurt self but no plan, PT in agreement to go to staff member should that change. (CM notified charge nurse). PT indicated that she would be getting up to go to group. Reassurance provided.

## 2023-11-08 NOTE — PHYSICAL THERAPY NOTE
11/08/23 1412   PT Last Visit   PT Visit Date 11/08/23   Note Type   Note Type Cancelled Session   Cancel Reasons Refusal

## 2023-11-08 NOTE — PLAN OF CARE
Problem: Alteration in Thoughts and Perception  Goal: Verbalize thoughts and feelings  Description: Interventions:  - Promote a nonjudgmental and trusting relationship with the patient through active listening and therapeutic communication  - Assess patient's level of functioning, behavior and potential for risk  - Engage patient in 1 on 1 interactions  - Encourage patient to express fears, feelings, frustrations, and discuss symptoms    - [place] patient to reality, help patient recognize reality-based thinking   - Administer medications as ordered and assess for potential side effects  - Provide the patient education related to the signs and symptoms of the illness and desired effects of prescribed medications  Outcome: Progressing     Problem: Ineffective Coping  Goal: Participates in unit activities  Description: Interventions:  - Provide therapeutic environment   - Provide required programming   - Redirect inappropriate behaviors   Outcome: Progressing  Goal: Patient/Family verbalizes awareness of resources  Outcome: Progressing  Goal: Understands least restrictive measures  Description: Interventions:  - Utilize least restrictive behavior  Outcome: Progressing  Goal: Free from restraint events  Description: - Utilize least restrictive measures   - Provide behavioral interventions   - Redirect inappropriate behaviors   Outcome: Progressing     Problem: Nutrition/Hydration-ADULT  Goal: Nutrient/Hydration intake appropriate for improving, restoring or maintaining nutritional needs  Description: Monitor and assess patient's nutrition/hydration status for malnutrition. Collaborate with interdisciplinary team and initiate plan and interventions as ordered. Monitor patient's weight and dietary intake as ordered or per policy. Utilize nutrition screening tool and intervene as necessary. Determine patient's food preferences and provide high-protein, high-caloric foods as appropriate.      INTERVENTIONS:  - Monitor oral intake, urinary output, labs, and treatment plans  - Assess nutrition and hydration status and recommend course of action  - Evaluate amount of meals eaten  - Assist patient with eating if necessary   - Allow adequate time for meals  - Recommend/ encourage appropriate diets, oral nutritional supplements, and vitamin/mineral supplements  - Order, calculate, and assess calorie counts as needed  - Recommend, monitor, and adjust tube feedings and TPN/PPN based on assessed needs  - Assess need for intravenous fluids  - Provide specific nutrition/hydration education as appropriate  - Include patient/family/caregiver in decisions related to nutrition  Outcome: Progressing

## 2023-11-08 NOTE — NURSING NOTE
Patient has been withdrawn to  her room throughout the day except for meals. Patient appears tired. Patient affect was flat and mood is depressed. Patient denies anxiety and endorsed depression. Denied SI/HI/AVH this morning. Endorsed SI with plan this evening; "plan is to hit self" Patient placed in paper clothes for safety and visible in milieu. Provided multiple activities in agreement by patient to cope with. Patient continues to have thoughts throughout the evening. Will continue to monitor for safety and behaviors. Q 7 min safety checks maintained.

## 2023-11-08 NOTE — NURSING NOTE
Patient noted to be visible and social w/ select peer on the milieu. Denied Hallucinations and SI/HI during interaction. Endorsed moderate to severe anxiety and depression. Affect flat. Utilized prn's for moderate anxiety and severe agitation r/t unit stimulation. Patient reported prn's given were effective as well as removing herself from the stimulation by retreating to her room for the remainder of the evening. Offers no further complaints at this time. Q 7 min safety checks continuous and maintained.

## 2023-11-09 PROCEDURE — 99232 SBSQ HOSP IP/OBS MODERATE 35: CPT | Performed by: PSYCHIATRY & NEUROLOGY

## 2023-11-09 PROCEDURE — 97530 THERAPEUTIC ACTIVITIES: CPT

## 2023-11-09 PROCEDURE — 97116 GAIT TRAINING THERAPY: CPT

## 2023-11-09 RX ORDER — RISPERIDONE 2 MG/1
2 TABLET ORAL DAILY
Status: DISCONTINUED | OUTPATIENT
Start: 2023-11-10 | End: 2023-11-16 | Stop reason: HOSPADM

## 2023-11-09 RX ADMIN — TRAZODONE HYDROCHLORIDE 100 MG: 100 TABLET ORAL at 20:59

## 2023-11-09 RX ADMIN — DIVALPROEX SODIUM 1000 MG: 500 TABLET, FILM COATED, EXTENDED RELEASE ORAL at 20:59

## 2023-11-09 RX ADMIN — SENNOSIDES AND DOCUSATE SODIUM 2 TABLET: 8.6; 5 TABLET ORAL at 20:58

## 2023-11-09 RX ADMIN — RISPERIDONE 3 MG: 2 TABLET ORAL at 20:58

## 2023-11-09 RX ADMIN — Medication 3 MG: at 20:59

## 2023-11-09 RX ADMIN — RISPERIDONE 2 MG: 2 TABLET ORAL at 08:15

## 2023-11-09 RX ADMIN — MECLIZINE 25 MG: 12.5 TABLET ORAL at 17:31

## 2023-11-09 RX ADMIN — MECLIZINE 25 MG: 12.5 TABLET ORAL at 08:14

## 2023-11-09 RX ADMIN — LORAZEPAM 1 MG: 1 TABLET ORAL at 11:05

## 2023-11-09 RX ADMIN — ACETAMINOPHEN 975 MG: 325 TABLET ORAL at 19:45

## 2023-11-09 RX ADMIN — DICLOFENAC SODIUM 4 G: 10 GEL TOPICAL at 21:12

## 2023-11-09 RX ADMIN — Medication 1 APPLICATION: at 21:12

## 2023-11-09 RX ADMIN — FLUOXETINE 60 MG: 20 CAPSULE ORAL at 08:14

## 2023-11-09 RX ADMIN — PANTOPRAZOLE SODIUM 20 MG: 20 TABLET, DELAYED RELEASE ORAL at 06:16

## 2023-11-09 RX ADMIN — PRAZOSIN HYDROCHLORIDE 2 MG: 1 CAPSULE ORAL at 20:58

## 2023-11-09 RX ADMIN — CYANOCOBALAMIN TAB 500 MCG 500 MCG: 500 TAB at 08:15

## 2023-11-09 NOTE — NURSING NOTE
Upon assessment this morning pt is irritable. She reports feeling over stimulated by select peers being "very loud" in the dining areas. Coping skills discussed w/ pt and she verbalize she will leave the area if she beings to feel irritated. She does come to staff and express frustration throughout the morning. She is able to be redirected each time. She does report wanting to "be safe" and requests we take her utensils from her. She has remained in eye sight of the nursing desk this morning. She is currently doing word searches and coloring which she reports are a coping skill. Plan of care continues. Q7 minute safety checks in progress.

## 2023-11-09 NOTE — NURSING NOTE
Patient complains or feeling severely anxious and irritable due to the unit being very loud this morning. She requests PRN intervention. PRN Ativan 1mg administered at 1105 for a Smith score of "23".

## 2023-11-09 NOTE — PROGRESS NOTES
Progress Note - Behavioral Health     Leydi Khan 47 y.o. female MRN: [id number]   Unit/Bed#: [facility] 208-01 Encounter: [id number]    Behavior over the last 24 hours: minimal improvement. Leydi was seen today for continuing care. She reports "feeling bad" and claims she hit herself last night out of frustration. Continues hearing "derogatory voices telling her she is no good and ugly". Admits to SI but denies any active plan or intent to hurt herself and is able to contract for safety. Continues with episodes of high anxiety and takes prn meds which has been partially effective. Staff report pt is withdrawn today with limited interaction in groups and on the unit. Sleep:  improving  Appetite: normal  Medication side effects: Denies. ROS: all other systems are negative    Mental Status Evaluation:    Appearance:  age appropriate, overweight   Behavior:  guarded, responds to redirection   Speech:  normal rate, normal volume   Mood:  "not well "   Affect:  constricted   Thought Process:  concrete   Associations: concrete associations   Thought Content:  mild paranoia   Perceptual Disturbances: auditory hallucinations with derogatory comments   Risk Potential: Suicidal ideation - Yes, without plan, would not feel safe if discharged  Homicidal ideation - None   Sensorium:  oriented to person, place, and time/date   Memory:  recent and remote memory grossly intact   Consciousness:  alert and awake   Attention: attention span and concentration are age appropriate   Insight:  limited   Judgment: limited   Gait/Station: normal gait/station   Motor Activity: no abnormal movements     Vital signs in last 24 hours:    Temp:  [97.6 °F (36.4 °C)-99.2 °F (37.3 °C)] 97.6 °F (36.4 °C)  HR:  [79-84] 84  Resp:  [16-18] 16  BP: (108-142)/(57-76) 108/57    Laboratory results: I have personally reviewed all pertinent laboratory/tests results.   Most Recent Labs:   Lab Results   Component Value Date    WBC 7.36 11/03/2023    RBC 3.82 11/03/2023    HGB 11.9 11/03/2023    HCT 36.5 11/03/2023     11/03/2023    RDW 14.5 11/03/2023    TOTANEUTABS 5.83 12/13/2022    NEUTROABS 4.33 11/03/2023    SODIUM 134 (L) 11/03/2023    K 4.2 11/03/2023    CL 97 11/03/2023    CO2 31 11/03/2023    BUN 13 11/03/2023    CREATININE 0.50 (L) 11/03/2023    GLUC 84 11/03/2023    GLUF 130 (H) 12/13/2022    CALCIUM 9.2 11/03/2023    AST 10 (L) 11/02/2023    ALT 10 11/02/2023    ALKPHOS 58 11/02/2023    TP 6.9 11/02/2023    ALB 4.1 11/02/2023    TBILI 0.34 11/02/2023    CHOLESTEROL 212 (H) 11/03/2023    HDL 42 (L) 11/03/2023    TRIG 223 (H) 11/03/2023    LDLCALC 125 (H) 11/03/2023    [address] 11/03/2023    VALPROICTOT 13 (L) 08/10/2023    [id number] 3.882 11/02/2023    PREGUR negative 11/06/2022    PREGSERUM Negative 04/28/2022    RPR Non-Reactive 12/13/2022    HGBA1C 4.5 08/08/2023    EAG 82 08/08/2023       Assessment/Plan   Principal Problem:    Major depression with psychotic features ([address])  Active Problems:    Primary hypertension    Hyperlipidemia    Class 3 severe obesity due to excess calories without serious comorbidity in [facility])    Chronic midline low back pain without sciatica    PTSD (post-traumatic stress disorder)    Vertigo    Seizures (HCC)    Insomnia    Anemia    Recommended Treatment:     Planned medication and treatment changes:     All current active medications have been reviewed  Encourage group therapy, milieu therapy and occupational therapy  Behavioral Health checks every 7 minutes  Increase Risperidal to 5 (2 mg am + 3 mg hs) mg po daily      Current Facility-Administered Medications   Medication Dose Route Frequency Provider Last Rate    acetaminophen  650 mg Oral Q4H PRN Sarah L Dagnall, PA-C      acetaminophen  650 mg Oral Q6H PRN Sarah Mckay PA-C      acetaminophen  975 mg Oral Q6H PRN Sarah Mckay PA-C      aluminum-magnesium hydroxide-simethicone  30 mL Oral Q4H PRN Seth Gonsalves MD      ammonium lactate Topical BID Sarah L Dagnall, PA-C      benztropine  1 mg Oral BID PRN Robert Garcia MD      Martina Courtney ON 1/6/2024] cholecalciferol  1,000 Units Oral Daily Arman Cushing, MD      cyanocobalamin  500 mcg Oral Daily Arman Cushing, MD      Diclofenac Sodium  4 g Topical 4x Daily Arman Cushing, MD      divalproex sodium  1,000 mg Oral HS Robert Garcia MD      ergocalciferol  50,000 Units Oral Weekly Arman Cushing, MD      FLUoxetine  60 mg Oral Daily Robetr Garcia MD      haloperidol lactate  5 mg Intramuscular Q4H PRN Max 4/day Andrew Camacho MD      hydrOXYzine HCL  50 mg Oral Q6H PRN Max 4/day Robert Garcia MD      LORazepam  1 mg Intramuscular Q6H PRN Max 3/day Andrew Camacho MD      LORazepam  1 mg Oral Q8H PRN Robert Garcia MD      meclizine  25 mg Oral BID Sarah L Dagnall, PA-C      melatonin  3 mg Oral HS Andrew Camacho MD      nicotine polacrilex  4 mg Oral Q2H PRN Andrew Camacho MD      nystatin   Topical BID Sarah L Dagnall, PA-C      pantoprazole  20 mg Oral Early Morning Sarah L Dagnall, PA-C      polyethylene glycol  17 g Oral Daily PRN Sarah L Dagnall, PA-C      prazosin  2 mg Oral HS Sarah L Dagnall, PA-C      risperiDONE  0.25 mg Oral Q4H PRN Max 6/day Anrdew Camacho MD      risperiDONE  0.5 mg Oral Q4H PRN Max 3/day SHANI Ferrara      risperiDONE  1 mg Oral Q2H PRN Max 3/day Andrew Camacho MD      risperiDONE  2 mg Oral BID SHANI Ferrara      senna-docusate sodium  2 tablet Oral HS Sarah L Dagnall, PA-C      traZODone  100 mg Oral HS Robert Garcia MD         Risks / Benefits of Treatment:    Risks, benefits, and possible side effects of medications explained to patient and patient verbalizes understanding and agreement for treatment. Counseling / Coordination of Care:    Patient's progress discussed with staff in treatment team meeting. Medications, treatment progress and treatment plan reviewed with patient. Supportive therapy provided to patient.   Group attendance encouraged.     Tiki Philippe MD 11/09/23

## 2023-11-09 NOTE — NURSING NOTE
Patient appears to have slept through the overnight hours without issue in room 211, unlocked. No complaints voiced. No acute behaviors observed. Patient remains in bed sleeping at this time. Continuous safety rounding in progress.

## 2023-11-09 NOTE — NURSING NOTE
Patient was observed to be visible during the entire evening hours. She continues to endorse SI with plan to hit herself. She remains in paper scrubs and is visible. Leydi was medication compliant at HS. Safety socks on b/l feet. Patient is agreeable to sleeping in room 211 for closer monitoring d/t SI. Verbal support given to patient as she is able to communicate with staff when she feels like hurting herself. Leydi endorses anxiety and depression. She denies HI. Endorses AH of voices telling her she is fat, ugly and she should hurt herself. Continues to have negative thinking. Writing on paper with crayons. Positive for snack and fluids tonight. Continuous safety rounding in progress.

## 2023-11-09 NOTE — NURSING NOTE
Patient states to this writer, "I want to hit myself until I have a seizure."  Patient presents with agitation, unable to express any reasons for increased agitation. She was instructed to remain visible and is currently wearing paper clothes for safety. She is writing with zenobia currently and sitting near the nurses station. Performed an Agitation scale rating with a result of 36. Administered PRN Risperdal 1 mg as per order. Will monitor for medication effectiveness.

## 2023-11-09 NOTE — NURSING NOTE
Pt reports Prn Ativan 1mg has been effective for her. She reports feeling "a little tired". She has been able to endorse feeling safe and less irritated by peers.

## 2023-11-09 NOTE — OCCUPATIONAL THERAPY NOTE
11/09/23 1204   Note Type   Note Type Treatment   Pain Assessment   Pain Assessment Tool 0-10   Pain Score No Pain   Restrictions/Precautions   Weight Bearing Precautions Per Order No   Other Precautions Fall Risk;Pain   Transfers   Sit to Stand 6  Modified independent   Additional items Increased time required;Verbal cues   Stand to Sit 6  Modified independent   Additional items Increased time required;Verbal cues   Stand pivot 5  Supervision   Additional items Assist x 1; Increased time required;Verbal cues   Functional Mobility   Functional Mobility 5  Supervision   Additional Comments Pt ambulated 200 feet with S x 1 and no AD. Therapeutic Excerise-Strength   UE Strength Yes   Right Upper Extremity- Strength   R Shoulder Flexion; Extension   R Position Seated   Equipment Dumbbell   R Weight/Reps/Sets 1.5# weight x 5-8 reps   Left Upper Extremity-Strength   L Weights/Reps/Sets TE same as R UE above   Cognition   Overall Cognitive Status WFL   Arousal/Participation Alert; Responsive; Cooperative   Attention Within functional limits   Orientation Level Oriented X4   Memory Decreased recall of precautions;Decreased recall of biographical information   Following Commands Follows one step commands with increased time or repetition   Comments PT agreeable to OT treatment. Activity Tolerance   Activity Tolerance Other (Comment)  (Cognition)   Assessment   Assessment Patient participated in Skilled OT session this date with interventions consisting of functional transfer training, Energy Conservation techniques and therapeutic exercise to: increase functional use of BUEs, increase BUE muscle strength  . Patient agreeable to OT treatment session, upon arrival patient was found seated OOB to Chair, alert, responsive , and in no apparent distress. Patient transfers sit to stand with Mod I. Patient then ambulates 200+ feet without assistive device and occasional Vcs for safety.  Patient then performs UB TE as detailed in flow sheet. Patient required max encouragement for participation and stated "I'm not in the mood for this." Declined further activity at this time. Session ended with patient seated OOB to chair and all needs met. In comparison to previous session, patient with improvements in endurance and functional transfers/mobility. Patient requiring verbal cues for correct technique, verbal cues for pacing thru activity steps, and frequent rest periods. Patient performance  demonstrated good carryover of learned techniques and strategies to facilitate safety during functional tasks. Patient continues to be functioning below baseline level, occupational performance remains limited secondary to factors listed above and increased risk for falls and injury. From OT standpoint, recommendation at time of d/c would be Home OT Patient to benefit from continued Occupational Therapy treatment while in the hospital to address deficits as defined above and maximize level of functional independence with ADLs and functional mobility in order to return to OF. Plan   Treatment Interventions Functional transfer training;UE strengthening/ROM; Energy conservation   Goal Expiration Date 11/26/23   OT Treatment Day 1   OT Frequency   (2-5x/wk)   Discharge Recommendation   Rehab Resource Intensity Level, OT III (Minimum Resource Intensity)   Additional Comments  The patient's raw score on the AM-PAC Daily Activity Inpatient Short Form is 19. A raw score of greater than or equal to 19 suggests the patient may benefit from discharge to home. Please refer to the recommendation of the Occupational Therapist for safe discharge planning.    AM-PAC Daily Activity Inpatient   Lower Body Dressing 3   Bathing 3   Toileting 3   Upper Body Dressing 3   Grooming 3   Eating 4   Daily Activity Raw Score 19   Daily Activity Standardized Score (Calc for Raw Score >=11) 40.22   -PAC Applied Cognition Inpatient   Following a Speech/Presentation 3 Understanding Ordinary Conversation 4   Taking Medications 3   Remembering Where Things Are Placed or Put Away 3   Remembering List of 4-5 Errands 2   Taking Care of Complicated Tasks 2   Applied Cognition Raw Score 17   Applied Cognition Standardized Score 36.52       Bhakti White

## 2023-11-09 NOTE — PLAN OF CARE
Problem: SELF HARM/SUICIDALITY  Goal: Will have no self-injury during hospital stay  Description: INTERVENTIONS:  - Q 15 MINUTES: Routine safety checks  - Q WAKING SHIFT & PRN: Assess risk to determine if routine checks are adequate to maintain patient safety  - Encourage patient to participate actively in care by formulating a plan to combat response to suicidal ideation, identify supports and resources  11/8/2023 2328 by Rahel Cruz RN  Outcome: Progressing  11/8/2023 2312 by Rahel Cruz RN  Outcome: Progressing     Problem: Alteration in Thoughts and Perception  Goal: Verbalize thoughts and feelings  Description: Interventions:  - Promote a nonjudgmental and trusting relationship with the patient through active listening and therapeutic communication  - Assess patient's level of functioning, behavior and potential for risk  - Engage patient in 1 on 1 interactions  - Encourage patient to express fears, feelings, frustrations, and discuss symptoms    - [place] patient to reality, help patient recognize reality-based thinking   - Administer medications as ordered and assess for potential side effects  - Provide the patient education related to the signs and symptoms of the illness and desired effects of prescribed medications  11/8/2023 2328 by Rahel Cruz RN  Outcome: Progressing  11/8/2023 2312 by Rahel Cruz RN  Outcome: Progressing  Goal: Refrain from acting on delusional thinking/internal stimuli  Description: Interventions:  - Monitor patient closely, per order   - Utilize least restrictive measures   - Set reasonable limits, give positive feedback for acceptable   - Administer medications as ordered and monitor of potential side effects  11/8/2023 2328 by Rahel Cruz RN  Outcome: Progressing  11/8/2023 2312 by Rahel Cruz RN  Outcome: Progressing  Goal: Agree to be compliant with medication regime, as prescribed and report medication side effects  Description: Interventions:  - Offer appropriate PRN medication and supervise ingestion; conduct AIMS, as needed   11/8/2023 2328 by Marifer Lancaster RN  Outcome: Progressing  11/8/2023 2312 by Marifer Lancaster RN  Outcome: Progressing     Problem: Ineffective Coping  Goal: Understands least restrictive measures  Description: Interventions:  - Utilize least restrictive behavior  11/8/2023 2328 by Marifer Lancaster RN  Outcome: Progressing  11/8/2023 2312 by Marifer Lancaster RN  Outcome: Progressing  Goal: Free from restraint events  Description: - Utilize least restrictive measures   - Provide behavioral interventions   - Redirect inappropriate behaviors   11/8/2023 2328 by Marifer Lancaster RN  Outcome: Progressing  11/8/2023 2312 by Marifer Lancaster RN  Outcome: Progressing     Problem: Anxiety  Goal: Anxiety is at manageable level  Description: Interventions:  - Assess and monitor patient's anxiety level. - Monitor for signs and symptoms (heart palpitations, chest pain, shortness of breath, headaches, nausea, feeling jumpy, restlessness, irritable, apprehensive). - Collaborate with interdisciplinary team and initiate plan and interventions as ordered. - [place] patient to unit/surroundings  - Explain treatment plan  - Encourage participation in care  - Encourage verbalization of concerns/fears  - Identify coping mechanisms  - Assist in developing anxiety-reducing skills  - Administer/offer alternative therapies  - Limit or eliminate stimulants  Outcome: Progressing     Problem: Nutrition/Hydration-ADULT  Goal: Nutrient/Hydration intake appropriate for improving, restoring or maintaining nutritional needs  Description: Monitor and assess patient's nutrition/hydration status for malnutrition. Collaborate with interdisciplinary team and initiate plan and interventions as ordered. Monitor patient's weight and dietary intake as ordered or per policy. Utilize nutrition screening tool and intervene as necessary.  Determine patient's food preferences and provide high-protein, high-caloric foods as appropriate.      INTERVENTIONS:  - Monitor oral intake, urinary output, labs, and treatment plans  - Assess nutrition and hydration status and recommend course of action  - Evaluate amount of meals eaten  - Assist patient with eating if necessary   - Allow adequate time for meals  - Recommend/ encourage appropriate diets, oral nutritional supplements, and vitamin/mineral supplements  - Order, calculate, and assess calorie counts as needed  - Recommend, monitor, and adjust tube feedings and TPN/PPN based on assessed needs  - Assess need for intravenous fluids  - Provide specific nutrition/hydration education as appropriate  - Include patient/family/caregiver in decisions related to nutrition  Outcome: Progressing

## 2023-11-09 NOTE — PROGRESS NOTES
Progress Note - Teri Tyler 47 y.o. female MRN: [id number]    Unit/Bed#[facility] 208-01 Encounter: [id number]        Subjective:   Patient seen and examined at bedside after reviewing the chart and discussing the case with the caring staff. Patient examined at bedside. Patient reports no acute symptoms. Physical Exam   Vitals: Blood pressure 119/61, pulse 79, temperature 99.2 °F (37.3 °C), temperature source Temporal, resp. rate 18, height 5' 6" (1.676 m), weight 117 kg (258 lb), SpO2 97 %. ,Body mass index is 41.64 kg/m². Constitutional: Patient in no acute distress. HEENT: PERR, EOMI, MMM. Cardiovascular: Normal rate and regular rhythm. Pulmonary/Chest: Effort normal and breath sounds normal.   Abdomen: Soft, + BS, NT. Assessment/Plan:     1. Cardiac with hx of HTN, HLD. Continue Prazosin 2 mg nightly. Continue to monitor. 2.  GERD. Continue Protonix 20 mg daily. 3.  Constipation. Continue Senokot S 2 tablets daily at bedtime. 4.  Seizure disorder. Seizure precautions. Patient is on Depakote 1000 mg nightly. Reports last seizure was over 10 years ago. 5.  DJD/OA along with bilateral shoulder and arm pain. Tylenol as needed. And may get Voltaren gel 4 times daily over the shoulders and arm. 6.  Dry skin. Apply ammonium lactate to bilateral feet twice daily. 7.  Vertigo. Continue meclizine 25 mg twice daily. 8.  Intertrigo. Apply nystatin powder twice daily. 9.  Vitamin D deficiency. Patient started on vitamin D bolus doses for 10 weeks followed by vitamin D3 1000 units daily. 10.  Vitamin B12 deficiency. Patient started on B12 supplements. The patient was discussed with Dr. Radha Fritz and he is in agreement with the above note.

## 2023-11-09 NOTE — NURSING NOTE
Patient currently sleeping in room 211 without issue. No acute behaviors observed. No self-injurious behaviors observed. Continuous safety rounding in place.

## 2023-11-09 NOTE — PHYSICAL THERAPY NOTE
PHYSICAL THERAPY TREATMENT NOTE  NAME:  Shay Nava  DATE: 11/09/23    Length Of Stay: 6  Performed at least 2 patient identifiers during session: Name and ID bracelet    TREATMENT:      11/09/23 1200   PT Last Visit   PT Visit Date 11/09/23   Note Type   Note Type Treatment   Pain Assessment   Pain Assessment Tool 0-10   Pain Score No Pain   Restrictions/Precautions   Weight Bearing Precautions Per Order No   Other Precautions Fall Risk   General   Chart Reviewed Yes   Family/Caregiver Present No   Cognition   Overall Cognitive Status WFL   Arousal/Participation Alert; Responsive   Attention Within functional limits   Orientation Level Oriented X4   Memory Decreased recall of precautions;Decreased recall of biographical information   Following Commands Follows one step commands without difficulty   Comments pt agreeable to limites PT session   Subjective   Subjective "physically I'm fine, mentally I'm not"   Bed Mobility   Additional Comments DNT, patient sitting in chair on unit   Transfers   Sit to Stand 6  Modified independent   Additional items Increased time required;Verbal cues   Stand to Sit 6  Modified independent   Additional items Armrests; Increased time required   Additional Comments no AD utilized   Ambulation/Elevation   Gait pattern Improper Weight shift; Wide JOJO; Decreased foot clearance; Short stride   Gait Assistance 5  Supervision   Additional items Assist x 1   Assistive Device None   Distance 200 ft   Ambulation/Elevation Additional Comments slight lateral path deviation, no gross LOB noted   Balance   Static Sitting Normal   Dynamic Sitting Good   Static Standing Good   Dynamic Standing Fair +   Ambulatory Fair +   Endurance Deficit   Endurance Deficit Yes   Endurance Deficit Description pt declined ther ex d/t fatigue   Activity Tolerance   Activity Tolerance Patient limited by fatigue   Nurse Made Aware yes   Assessment   Prognosis Good   Problem List Decreased strength;Decreased endurance;Decreased range of motion;Decreased mobility;Obesity;Pain   Assessment Pt seen for PT treatment session this date with interventions consisting of gait training w/ emphasis on improving pt's ability to ambulate level surfaces x 200 ftx1 with close S provided by therapist with no AD and therapeutic activity consisting of training: sit<>stand transfers. Pt agreeable to PT treatment session upon arrival, pt found seated OOB in chair, in no apparent distress and responsive. In comparison to previous session, pt with improvements in distance ambulated . Post session: all needs in reach and RN notified of session findings/recommendations. Continue to recommend Level III (Minimum Resource Intensity) at time of d/c in order to maximize pt's functional independence and safety w/ mobility. Pt continues to be functioning below baseline level. PT will continue to see pt during current hospitalization in order to address the deficits listed above and provide interventions consistent w/ POC in effort to achieve STGs. Leydi Hoyos, PTA   Barriers to Discharge None   Goals   Patient Goals none stated   STG Expiration Date 11/25/23   PT Treatment Day 2   Plan   Treatment/Interventions Functional transfer training;LE strengthening/ROM; Therapeutic exercise; Endurance training;Bed mobility;Gait training   PT Frequency 2-3x/wk   Discharge Recommendation   Rehab Resource Intensity Level, PT III (Minimum Resource Intensity)   AM-PAC Basic Mobility Inpatient   Turning in Flat Bed Without Bedrails 4   Lying on Back to Sitting on Edge of Flat Bed Without Bedrails 4   Moving Bed to Chair 4   Standing Up From Chair Using Arms 4   Walk in Room 3   Climb 3-5 Stairs With Railing 2   Basic Mobility Inpatient Raw Score 21   Basic Mobility Standardized Score 45.55   Highest Level Of Mobility   JH-HLM Goal 6: Walk 10 steps or more   JH-HLM Achieved 7: Walk 25 feet or more   End of Consult   Patient Position at End of Consult All needs within reach         The patient's AM-PAC Basic Mobility Inpatient Short Form Raw Score is 21. A Raw score of greater than 16 suggests the patient may benefit from discharge to home. Please also refer to the recommendation of the Physical Therapist for safe discharge planning.       Wagner Carey, PTA,PTA

## 2023-11-09 NOTE — NURSING NOTE
On reassessment, patient informs the medication did not work for  her. She appears to be calm and cooperative. She remains visible in the milieu. Patient continues to endorse wanting to hit herself.

## 2023-11-09 NOTE — PROGRESS NOTES
Progress Note - Damián Trimble 47 y.o. female MRN: [id number]    Unit/Bed#[facility] 208-01 Encounter: [id number]        Subjective:   Patient seen and examined at bedside after reviewing the chart and discussing the case with the caring staff. Patient reports no acute symptoms. Physical Exam   Vitals: Blood pressure 108/57, pulse 84, temperature 97.6 °F (36.4 °C), temperature source Temporal, resp. rate 16, height 5' 6" (1.676 m), weight 117 kg (258 lb), SpO2 95 %. ,Body mass index is 41.64 kg/m². Constitutional: Patient in no acute distress. HEENT: PERR, EOMI, MMM. Cardiovascular: Normal rate and regular rhythm. Pulmonary/Chest: Effort normal and breath sounds normal.   Abdomen: Soft, + BS, NT. Assessment/Plan:     1. Cardiac with hx of HTN, HLD. Continue Prazosin 2 mg nightly. Continue to monitor. 2.  GERD. Continue Protonix 20 mg daily. 3.  Constipation. Continue Senokot S 2 tablets daily at bedtime. 4.  Seizure disorder. Seizure precautions. Patient is on Depakote 1000 mg nightly. Reports last seizure was over 10 years ago. 5.  DJD/OA along with bilateral shoulder and arm pain. Tylenol as needed. Voltaren gel 4 times daily over the shoulders and arm. 6.  Dry skin. Apply ammonium lactate to bilateral feet twice daily. 7.  Vertigo. Continue meclizine 25 mg twice daily. 8.  Intertrigo. Apply nystatin powder twice daily. 9.  Vitamin D deficiency. Patient started on vitamin D bolus doses for 10 weeks followed by vitamin D3 1000 units daily. 10.  Vitamin B12 deficiency. Patient started on B12 supplements. The patient was discussed with Dr. Joann Greco and he is in agreement with the above note.

## 2023-11-09 NOTE — PROGRESS NOTES
11/09/23    Team Meeting   Meeting Type Daily Rounds   Team Members Present   Team Members Present Physician;Nurse;;Occupational Therapist   Physician Team Member Dr. Sean Barfield MD; SHANI Benson   Nursing Team Member Catarnio Villanueva RN   Care Management Team Member MS Curly, [facility] [place]   OT Team Member Landon Cadena [place]   Patient/Family Present   Patient Present No   Patient's Family Present No   From [facility], follows up with new Johnella Model. Flat, depressed, si to hurt self, wrote letter negative, quiet room. AH continue that are negative. PT wanting to go to [facility]. Papers, finger foods, not to have any pencils or pens. All toiletries to be secured.

## 2023-11-09 NOTE — PLAN OF CARE
Problem: OCCUPATIONAL THERAPY ADULT  Goal: Performs self-care activities at highest level of function for planned discharge setting. See evaluation for individualized goals. Description: Treatment Interventions: ADL retraining, Functional transfer training, UE strengthening/ROM, Endurance training, Patient/family training, Energy conservation, Activityengagement          See flowsheet documentation for full assessment, interventions and recommendations. Outcome: Progressing  Note: Limitation: Decreased ADL status, Decreased UE strength, Decreased Safe judgement during ADL, Decreased endurance, Decreased high-level ADLs, Decreased UE ROM  Prognosis: Good  Assessment: Patient participated in Skilled OT session this date with interventions consisting of functional transfer training, Energy Conservation techniques and therapeutic exercise to: increase functional use of BUEs, increase BUE muscle strength  . Patient agreeable to OT treatment session, upon arrival patient was found seated OOB to Chair, alert, responsive , and in no apparent distress. Patient transfers sit to stand with Mod I. Patient then ambulates 200+ feet without assistive device and occasional Vcs for safety. Patient then performs UB TE as detailed in flow sheet. Patient required max encouragement for participation and stated "I'm not in the mood for this." Declined further activity at this time. Session ended with patient seated OOB to chair and all needs met. In comparison to previous session, patient with improvements in endurance and functional transfers/mobility. Patient requiring verbal cues for correct technique, verbal cues for pacing thru activity steps, and frequent rest periods. Patient performance  demonstrated good carryover of learned techniques and strategies to facilitate safety during functional tasks.  Patient continues to be functioning below baseline level, occupational performance remains limited secondary to factors listed above and increased risk for falls and injury. From OT standpoint, recommendation at time of d/c would be Home OT Patient to benefit from continued Occupational Therapy treatment while in the hospital to address deficits as defined above and maximize level of functional independence with ADLs and functional mobility in order to return to PLOF.      Rehab Resource Intensity Level, OT: III (Minimum Resource Intensity)

## 2023-11-09 NOTE — PLAN OF CARE
Problem: PHYSICAL THERAPY ADULT  Goal: Performs mobility at highest level of function for planned discharge setting. See evaluation for individualized goals. Description: Treatment/Interventions: LE strengthening/ROM, Therapeutic exercise, Endurance training, Patient/family training, Equipment eval/education, Gait training, Spoke to nursing  Equipment Recommended:  (none at this time; TBD)       See flowsheet documentation for full assessment, interventions and recommendations. Outcome: Progressing  Note: Prognosis: Good  Problem List: Decreased strength, Decreased endurance, Decreased range of motion, Decreased mobility, Obesity, Pain  Assessment: Pt seen for PT treatment session this date with interventions consisting of gait training w/ emphasis on improving pt's ability to ambulate level surfaces x 200 ftx1 with close S provided by therapist with no AD and therapeutic activity consisting of training: sit<>stand transfers. Pt agreeable to PT treatment session upon arrival, pt found seated OOB in chair, in no apparent distress and responsive. In comparison to previous session, pt with improvements in distance ambulated . Post session: all needs in reach and RN notified of session findings/recommendations. Continue to recommend Level III (Minimum Resource Intensity) at time of d/c in order to maximize pt's functional independence and safety w/ mobility. Pt continues to be functioning below baseline level. PT will continue to see pt during current hospitalization in order to address the deficits listed above and provide interventions consistent w/ POC in effort to achieve STGs. Leydi Oliver PTA  Barriers to Discharge: None     Rehab Resource Intensity Level, PT: III (Minimum Resource Intensity)    See flowsheet documentation for full assessment.

## 2023-11-10 PROCEDURE — 97530 THERAPEUTIC ACTIVITIES: CPT

## 2023-11-10 PROCEDURE — 99232 SBSQ HOSP IP/OBS MODERATE 35: CPT | Performed by: PSYCHIATRY & NEUROLOGY

## 2023-11-10 PROCEDURE — 97116 GAIT TRAINING THERAPY: CPT

## 2023-11-10 RX ORDER — BISACODYL 5 MG/1
10 TABLET, DELAYED RELEASE ORAL DAILY PRN
Status: DISCONTINUED | OUTPATIENT
Start: 2023-11-10 | End: 2023-11-16 | Stop reason: HOSPADM

## 2023-11-10 RX ADMIN — TRAZODONE HYDROCHLORIDE 100 MG: 100 TABLET ORAL at 21:05

## 2023-11-10 RX ADMIN — RISPERIDONE 2 MG: 2 TABLET ORAL at 08:43

## 2023-11-10 RX ADMIN — FLUOXETINE 60 MG: 20 CAPSULE ORAL at 08:43

## 2023-11-10 RX ADMIN — Medication 1 APPLICATION: at 21:37

## 2023-11-10 RX ADMIN — PRAZOSIN HYDROCHLORIDE 2 MG: 1 CAPSULE ORAL at 21:04

## 2023-11-10 RX ADMIN — RISPERIDONE 3 MG: 2 TABLET ORAL at 21:04

## 2023-11-10 RX ADMIN — DIVALPROEX SODIUM 1000 MG: 500 TABLET, FILM COATED, EXTENDED RELEASE ORAL at 21:04

## 2023-11-10 RX ADMIN — DICLOFENAC SODIUM 4 G: 10 GEL TOPICAL at 21:37

## 2023-11-10 RX ADMIN — HYDROXYZINE HYDROCHLORIDE 50 MG: 50 TABLET, FILM COATED ORAL at 17:30

## 2023-11-10 RX ADMIN — SENNOSIDES AND DOCUSATE SODIUM 2 TABLET: 8.6; 5 TABLET ORAL at 21:04

## 2023-11-10 RX ADMIN — PANTOPRAZOLE SODIUM 20 MG: 20 TABLET, DELAYED RELEASE ORAL at 05:41

## 2023-11-10 RX ADMIN — MECLIZINE 25 MG: 12.5 TABLET ORAL at 17:28

## 2023-11-10 RX ADMIN — Medication 3 MG: at 21:04

## 2023-11-10 RX ADMIN — CYANOCOBALAMIN TAB 500 MCG 500 MCG: 500 TAB at 08:43

## 2023-11-10 RX ADMIN — MECLIZINE 25 MG: 12.5 TABLET ORAL at 08:43

## 2023-11-10 NOTE — OCCUPATIONAL THERAPY NOTE
11/10/23 1222   OT Last Visit   OT Visit Date 11/11/23   Note Type   Note Type Treatment   Pain Assessment   Pain Assessment Tool 0-10   Pain Score No Pain   Restrictions/Precautions   Weight Bearing Precautions Per Order No   Other Precautions Fall Risk;Pain   Bed Mobility   Supine to Sit 6  Modified independent   Additional items Increased time required;Verbal cues   Additional Comments Sat unsupported EOB 1-2  min with no LOB noted   Transfers   Sit to Stand 6  Modified independent   Additional items Increased time required;Verbal cues   Stand to Sit 6  Modified independent   Additional items Increased time required;Verbal cues   Functional Mobility   Functional Mobility 5  Supervision   Additional Comments Pt ambulated 80 feet with S x 1. Cognition   Overall Cognitive Status WFL   Arousal/Participation Alert; Responsive; Cooperative   Attention Within functional limits   Orientation Level Oriented X4   Memory Decreased recall of precautions;Decreased recall of recent events   Following Commands Follows one step commands with increased time or repetition   Comments Pt agreeable to OT treatment. Activity Tolerance   Activity Tolerance Patient limited by fatigue  (cognition)   Assessment   Assessment Patient participated in Skilled OT session this date with interventions consisting of functional transfer training and Energy Conservation techniques . Patient agreeable to OT treatment session, upon arrival patient was found supine in bed, responsive , in no apparent distress, and sleepy. Patient transfrs supine to sit EOB with Mod I. Patient sits unsupported EOB 2-3 min with no LOB. Patient then stands and ambulates with S x 1 x 80 feet. Patient declined UB TE despite encouragement to participate. Session ended with patient seated OOB to chair and all needs met. In comparison to previous session, patient with improvements in bed mobility and functional transfers.  Patient requiring verbal cues for correct technique, verbal cues for pacing thru activity steps, and frequent rest periods. Patient performance  demonstrated good carryover of learned techniques and strategies to facilitate safety during functional tasks. Patient continues to be functioning below baseline level, occupational performance remains limited secondary to factors listed above and increased risk for falls and injury. From OT standpoint, recommendation at time of d/c would be Home OT. Patient to benefit from continued Occupational Therapy treatment while in the hospital to address deficits as defined above and maximize level of functional independence with ADLs and functional mobility in order to return to [facility]. Plan   Treatment Interventions Functional transfer training;Energy conservation   Goal Expiration Date 11/26/23   OT Treatment Day 2   OT Frequency   (2-5x/wk)   Discharge Recommendation   Rehab Resource Intensity Level, OT III (Minimum Resource Intensity)   Additional Comments  The patient's raw score on the AM-PAC Daily Activity Inpatient Short Form is 19. A raw score of greater than or equal to 19 suggests the patient may benefit from discharge to home. Please refer to the recommendation of the Occupational Therapist for safe discharge planning.    AM-PAC Daily Activity Inpatient   Lower Body Dressing 3   Bathing 3   Toileting 3   Upper Body Dressing 3   Grooming 3   Eating 4   Daily Activity Raw Score 19   Daily Activity Standardized Score (Calc for Raw Score >=11) 40.22   AM-PAC Applied Cognition Inpatient   Following a Speech/Presentation 3   Understanding Ordinary Conversation 4   Taking Medications 3   Remembering Where Things Are Placed or Put Away 3   Remembering List of 4-5 Errands 2   Taking Care of Complicated Tasks 2   Applied Cognition Raw Score 17   Applied Cognition Standardized Score 36.52       Ranjeet White

## 2023-11-10 NOTE — PROGRESS NOTES
Progress Note - Behavioral Health     Leydi Khan 47 y.o. female MRN: [id number]   Unit/Bed#: [facility] 208-01 Encounter: [id number]    Behavior over the last 24 hours: regressed. Leydi was seen today for continuing care. She reports feeling tired and admits she was in quiet room last night she tried to ask scratched herself superficially. Continues hearing "derogatory voices telling her she is no good and hurt herself. Denies that she hit herself last night and does not verbalizes active plan or intent to hurt herself and is able to contract for safety. Continues with episodes of high anxiety and takes prn meds. Staff report pt is withdrawn today with limited interaction in groups and on the unit. Sleep:  slept better  Appetite: normal  Medication side effects: Denies. ROS: all other systems are negative    Mental Status Evaluation:    Appearance:  age appropriate, overweight   Behavior:  guarded, responds to redirection   Speech:  normal rate, normal volume   Mood:  "Tired"   Affect:  constricted   Thought Process:  concrete   Associations: concrete associations   Thought Content:  mild paranoia   Perceptual Disturbances: auditory hallucinations with derogatory comments   Risk Potential: Suicidal ideation - Yes, passive death wish, contracts for safety on the unit  Homicidal ideation - None   Sensorium:  oriented to person, place, and time/date   Memory:  recent and remote memory grossly intact   Consciousness:  alert and awake   Attention: attention span and concentration are age appropriate   Insight:  limited   Judgment: limited   Gait/Station: normal gait/station   Motor Activity: no abnormal movements     Vital signs in last 24 hours:    Temp:  [97.6 °F (36.4 °C)-98.2 °F (36.8 °C)] 97.6 °F (36.4 °C)  HR:  [72-79] 72  Resp:  [18] 18  BP: (111-136)/(68-71) 136/71    Laboratory results: I have personally reviewed all pertinent laboratory/tests results.   Most Recent Labs:   Lab Results   Component Value Date WBC 7.36 11/03/2023    RBC 3.82 11/03/2023    HGB 11.9 11/03/2023    HCT 36.5 11/03/2023     11/03/2023    RDW 14.5 11/03/2023    TOTANEUTABS 5.83 12/13/2022    NEUTROABS 4.33 11/03/2023    SODIUM 134 (L) 11/03/2023    K 4.2 11/03/2023    CL 97 11/03/2023    CO2 31 11/03/2023    BUN 13 11/03/2023    CREATININE 0.50 (L) 11/03/2023    GLUC 84 11/03/2023    GLUF 130 (H) 12/13/2022    CALCIUM 9.2 11/03/2023    AST 10 (L) 11/02/2023    ALT 10 11/02/2023    ALKPHOS 58 11/02/2023    TP 6.9 11/02/2023    ALB 4.1 11/02/2023    TBILI 0.34 11/02/2023    CHOLESTEROL 212 (H) 11/03/2023    HDL 42 (L) 11/03/2023    TRIG 223 (H) 11/03/2023    LDLCALC 125 (H) 11/03/2023    [address] 170 11/03/2023    VALPROICTOT 13 (L) 08/10/2023    MRB7NMWBDBST 3.882 11/02/2023    PREGUR negative 11/06/2022    PREGSERUM Negative 04/28/2022    RPR Non-Reactive 12/13/2022    HGBA1C 4.5 08/08/2023    EAG 82 08/08/2023       Assessment/Plan   Principal Problem:    Major depression with psychotic features ([address])  Active Problems:    Primary hypertension    Hyperlipidemia    Class 3 severe obesity due to excess calories without serious comorbidity in adult [facility])    Chronic midline low back pain without sciatica    PTSD (post-traumatic stress disorder)    Vertigo    Seizures (HCC)    Insomnia    Anemia    Recommended Treatment:     Planned medication and treatment changes:     All current active medications have been reviewed  Encourage group therapy, milieu therapy and occupational therapy  Behavioral Health checks every 7 minutes  Requires continued inpatient treatment due to chronic illness and high risk of decompensation if discharged before long term stability is achieved     Current Facility-Administered Medications   Medication Dose Route Frequency Provider Last Rate    acetaminophen  650 mg Oral Q4H PRN Saraheddi Mckay PA-C      acetaminophen  650 mg Oral Q6H PRN Saraheddi Mckay PA-C      acetaminophen  975 mg Oral Q6H PRN Sarah L Dagnall, PA-C      aluminum-magnesium hydroxide-simethicone  30 mL Oral Q4H PRN Amilcar King MD      ammonium lactate   Topical BID Sarah L Dagnall, PA-C      benztropine  1 mg Oral BID PRN MD Elizabeth Vazquez ON 1/6/2024] cholecalciferol  1,000 Units Oral Daily Vern Molina MD      cyanocobalamin  500 mcg Oral Daily Vern Molina MD      Diclofenac Sodium  4 g Topical 4x Daily Vern Molina MD      divalproex sodium  1,000 mg Oral HS Tiffany Koenig MD      ergocalciferol  50,000 Units Oral Weekly Vern Molina MD      FLUoxetine  60 mg Oral Daily Tiffany Koenig MD      haloperidol lactate  5 mg Intramuscular Q4H PRN Max 4/day Amilcar King MD      hydrOXYzine HCL  50 mg Oral Q6H PRN Max 4/day Tiffany Koenig MD      LORazepam  1 mg Intramuscular Q6H PRN Max 3/day Amilcar King MD      LORazepam  1 mg Oral Q8H PRN Tiffany Koenig MD      meclizine  25 mg Oral BID Sarah L Dagnall, PA-C      melatonin  3 mg Oral HS Amilcar King MD      nicotine polacrilex  4 mg Oral Q2H PRN Amilcar King MD      nystatin   Topical BID Sarah L Dagnall, PA-C      pantoprazole  20 mg Oral Early Morning Sarah L Dagnall, PA-C      polyethylene glycol  17 g Oral Daily PRN Sarah L Dagnall, PA-C      prazosin  2 mg Oral HS Sarah L Roel, PA-C      risperiDONE  0.25 mg Oral Q4H PRN Max 6/day Amilcar King MD      risperiDONE  0.5 mg Oral Q4H PRN Max 3/day SHANI Contreras      risperiDONE  1 mg Oral Q2H PRN Max 3/day Amilcar King MD      risperiDONE  2 mg Oral Daily Tiffany Koenig MD      risperiDONE  3 mg Oral HS Tiffany Koenig MD      senna-docusate sodium  2 tablet Oral HS Sarah L Roel, PA-C      traZODone  100 mg Oral HS Tiffany Koenig MD         Risks / Benefits of Treatment:    Risks, benefits, and possible side effects of medications explained to patient and patient verbalizes understanding and agreement for treatment.     Counseling / Coordination of Care:    Patient's progress discussed with staff in treatment team meeting. Medications, treatment progress and treatment plan reviewed with patient. Supportive therapy provided to patient. Group attendance encouraged.     Gloria Beltre MD 11/10/23

## 2023-11-10 NOTE — SOCIAL WORK
CM met with PT for PT check in. PT inquiring about regular clothing, CM reviewed with PT the reason for paper clothes and goals set to be able to safely return to regular clothing. PT reflected that she understood and that she will work towards maintaining safety and working with staff. Verbal positive reinforcement provided. PT was pleasant and cooperative in conversation. PT denies si/hi/ah/vh, moderate anxiety and depression.

## 2023-11-10 NOTE — PLAN OF CARE
Problem: SELF HARM/SUICIDALITY  Goal: Will have no self-injury during hospital stay  Description: INTERVENTIONS:  - Q 15 MINUTES: Routine safety checks  - Q WAKING SHIFT & PRN: Assess risk to determine if routine checks are adequate to maintain patient safety  - Encourage patient to participate actively in care by formulating a plan to combat response to suicidal ideation, identify supports and resources  Outcome: Not Progressing     Problem: Alteration in Thoughts and Perception  Goal: Verbalize thoughts and feelings  Description: Interventions:  - Promote a nonjudgmental and trusting relationship with the patient through active listening and therapeutic communication  - Assess patient's level of functioning, behavior and potential for risk  - Engage patient in 1 on 1 interactions  - Encourage patient to express fears, feelings, frustrations, and discuss symptoms    - Micro patient to reality, help patient recognize reality-based thinking   - Administer medications as ordered and assess for potential side effects  - Provide the patient education related to the signs and symptoms of the illness and desired effects of prescribed medications  Outcome: Progressing  Goal: Refrain from acting on delusional thinking/internal stimuli  Description: Interventions:  - Monitor patient closely, per order   - Utilize least restrictive measures   - Set reasonable limits, give positive feedback for acceptable   - Administer medications as ordered and monitor of potential side effects  Outcome: Not Progressing  Goal: Agree to be compliant with medication regime, as prescribed and report medication side effects  Description: Interventions:  - Offer appropriate PRN medication and supervise ingestion; conduct AIMS, as needed   Outcome: Progressing  Goal: Attend and participate in unit activities, including therapeutic, recreational, and educational groups  Description: Interventions:  -Encourage Visitation and family involvement in care  Outcome: Progressing  Goal: Complete daily ADLs, including personal hygiene independently, as able  Description: Interventions:  - Observe, teach, and assist patient with ADLS  - Monitor and promote a balance of rest/activity, with adequate nutrition and elimination   Outcome: Progressing     Problem: Ineffective Coping  Goal: Demonstrates healthy coping skills  Outcome: Progressing  Goal: Participates in unit activities  Description: Interventions:  - Provide therapeutic environment   - Provide required programming   - Redirect inappropriate behaviors   Outcome: Progressing  Goal: Patient/Family participate in treatment and DC plans  Description: Interventions:  - Provide therapeutic environment  Outcome: Progressing  Goal: Patient/Family verbalizes awareness of resources  Outcome: Progressing  Goal: Understands least restrictive measures  Description: Interventions:  - Utilize least restrictive behavior  Outcome: Progressing  Goal: Free from restraint events  Description: - Utilize least restrictive measures   - Provide behavioral interventions   - Redirect inappropriate behaviors   Outcome: Progressing     Problem: Anxiety  Goal: Anxiety is at manageable level  Description: Interventions:  - Assess and monitor patient's anxiety level. - Monitor for signs and symptoms (heart palpitations, chest pain, shortness of breath, headaches, nausea, feeling jumpy, restlessness, irritable, apprehensive). - Collaborate with interdisciplinary team and initiate plan and interventions as ordered.   - [place] patient to unit/surroundings  - Explain treatment plan  - Encourage participation in care  - Encourage verbalization of concerns/fears  - Identify coping mechanisms  - Assist in developing anxiety-reducing skills  - Administer/offer alternative therapies  - Limit or eliminate stimulants  Outcome: Progressing     Problem: DISCHARGE PLANNING - CARE MANAGEMENT  Goal: Discharge to post-acute care or home with appropriate resources  Description: INTERVENTIONS:  - Conduct assessment to determine patient/family and health care team treatment goals, and need for post-acute services based on payer coverage, community resources, and patient preferences, and barriers to discharge  - Address psychosocial, clinical, and financial barriers to discharge as identified in assessment in conjunction with the patient/family and health care team  - Arrange appropriate level of post-acute services according to patient’s   needs and preference and payer coverage in collaboration with the physician and health care team  - Communicate with and update the patient/family, physician, and health care team regarding progress on the discharge plan  - Arrange appropriate transportation to post-acute venues  Outcome: Progressing     Problem: Nutrition/Hydration-ADULT  Goal: Nutrient/Hydration intake appropriate for improving, restoring or maintaining nutritional needs  Description: Monitor and assess patient's nutrition/hydration status for malnutrition. Collaborate with interdisciplinary team and initiate plan and interventions as ordered. Monitor patient's weight and dietary intake as ordered or per policy. Utilize nutrition screening tool and intervene as necessary. Determine patient's food preferences and provide high-protein, high-caloric foods as appropriate.      INTERVENTIONS:  - Monitor oral intake, urinary output, labs, and treatment plans  - Assess nutrition and hydration status and recommend course of action  - Evaluate amount of meals eaten  - Assist patient with eating if necessary   - Allow adequate time for meals  - Recommend/ encourage appropriate diets, oral nutritional supplements, and vitamin/mineral supplements  - Order, calculate, and assess calorie counts as needed  - Recommend, monitor, and adjust tube feedings and TPN/PPN based on assessed needs  - Assess need for intravenous fluids  - Provide specific nutrition/hydration education as appropriate  - Include patient/family/caregiver in decisions related to nutrition  Outcome: Progressing

## 2023-11-10 NOTE — NURSING NOTE
Patient was observed to be visible in the community this evening; sitting by the nurses station. She has been calm and cooperative during staff interactions. She denies feeling anxious, however endorses severe depression, denies HI. She continues to endorse AH of voices telling her negative things such as she is fat and ugly. States she does not feel safe, but states she is able to talk with staff if she feels like she is going to hurt herself. No self-injurious behaviors observed thus far on this shift. She was agreeable to sleeping in room 211, unlocked. Leydi was medication compliant at HS. Positive for snack and fluids tonight. Continuous safety rounding in progress. Safety tread socks on b/l feet.

## 2023-11-10 NOTE — CMS CERTIFICATION NOTE
Recertification: Based upon physical, mental and social evaluations, I certify that inpatient psychiatric services continue to be medically necessary for this patient for a duration of 30 midnights for the treatment of Major depression with psychotic features ([address])

## 2023-11-10 NOTE — PHYSICAL THERAPY NOTE
PHYSICAL THERAPY TREATMENT NOTE  NAME:  Gerardo Avery  DATE: 11/10/23    Length Of Stay: 7  Performed at least 2 patient identifiers during session: Name and ID bracelet    TREATMENT:      11/10/23 1223   PT Last Visit   PT Visit Date 11/10/23   Note Type   Note Type Treatment   Pain Assessment   Pain Assessment Tool 0-10   Pain Score No Pain   Restrictions/Precautions   Weight Bearing Precautions Per Order No   Other Precautions Fall Risk;Pain   General   Chart Reviewed Yes   Family/Caregiver Present No   Cognition   Overall Cognitive Status WFL   Arousal/Participation Alert; Responsive   Attention Within functional limits   Orientation Level Oriented X4   Memory Decreased recall of precautions;Decreased recall of recent events   Following Commands Follows one step commands with increased time or repetition   Comments pt requires encouragement for participation   Bed Mobility   Supine to Sit 6  Modified independent   Additional items Increased time required;Verbal cues   Additional Comments Sat unsupported EOB 1-2  min with no LOB noted   Transfers   Sit to Stand 6  Modified independent   Additional items Increased time required;Verbal cues   Stand to Sit 6  Modified independent   Additional items Increased time required;Verbal cues   Stand pivot 5  Supervision   Additional items Increased time required;Verbal cues   Additional Comments no AD utilized   Ambulation/Elevation   Gait pattern Wide JOJO; Decreased foot clearance; Short stride   Gait Assistance 5  Supervision   Additional items Assist x 1   Assistive Device None   Distance 80 ftx1 to DR   Ambulation/Elevation Additional Comments slight lateral path deviation, no gross LOB noted   Balance   Static Sitting Normal   Dynamic Sitting Good   Static Standing Good   Dynamic Standing Fair +   Ambulatory Fair +   Endurance Deficit   Endurance Deficit Yes   Endurance Deficit Description pt declined ther ex   Activity Tolerance   Activity Tolerance Patient limited by fatigue   Nurse Made Aware yes   Assessment   Prognosis Good   Problem List Decreased strength;Decreased endurance; Impaired balance;Decreased mobility; Impaired judgement;Decreased safety awareness   Assessment Pt seen for PT treatment session this date with interventions consisting of gait training w/ emphasis on improving pt's ability to ambulate level surfaces x 80 ft with distant S provided by therapist with no AD and therapeutic activity consisting of training: bed mobility, supine<>sit transfers, and sit<>stand transfers. Pt agreeable to PT treatment session upon arrival, pt found supine in bed w/ HOB elevated, in no apparent distress and responsive. In comparison to previous session, pt with no improvements as evidenced by decreased ambulation distance . Post session: all needs in reach and RN notified of session findings/recommendations. Continue to recommend Level III (Minimum Resource Intensity) at time of d/c in order to maximize pt's functional independence and safety w/ mobility. Pt continues to be functioning below baseline level. PT will continue to see pt during current hospitalization in order to address the deficits listed above and provide interventions consistent w/ POC in effort to achieve STGs. Leydi Kuhn PTA   Barriers to Discharge None   Goals   Patient Goals none stated   STG Expiration Date 11/25/23   Plan   Treatment/Interventions Functional transfer training;LE strengthening/ROM; Therapeutic exercise; Endurance training;Bed mobility;Gait training;Spoke to nursing   PT Frequency 2-3x/wk   Discharge Recommendation   Rehab Resource Intensity Level, PT III (Minimum Resource Intensity)   AM-PAC Basic Mobility Inpatient   Turning in Flat Bed Without Bedrails 4   Lying on Back to Sitting on Edge of Flat Bed Without Bedrails 4   Moving Bed to Chair 4   Standing Up From Chair Using Arms 4   Walk in Room 3   Climb 3-5 Stairs With Railing 2   Basic Mobility Inpatient Raw Score 21   Basic Mobility Standardized Score 45.55   Highest Level Of Mobility   -HLM Goal 6: Walk 10 steps or more   JH-HLM Achieved 7: Walk 25 feet or more   End of Consult   Patient Position at End of Consult All needs within reach         The patient's AM-PAC Basic Mobility Inpatient Short Form Raw Score is 21. A Raw score of greater than 16 suggests the patient may benefit from discharge to home. Please also refer to the recommendation of the Physical Therapist for safe discharge planning.       Marco Jeff, PTA,PTA

## 2023-11-10 NOTE — NURSING NOTE
Patient requested and received PRN Tylenol 975 mg for c/o headache with a rating of 10/10. Administered as per order for severe pain.

## 2023-11-10 NOTE — NURSING NOTE
Patient has been withdrawn to her room throughout the day except for meals. Patient appears tired. Patient affect was flat and mood is depressed. Patient rates 8/10 anxiety and depression. Denied SI/HI/AVH this morning. Patient remains in paper clothes for safety and finger foods no utensils. Patient states she feels great and requested to have utensils, explained if she feels safe for at least 24 hours she may have utensils. Will continue to monitor for safety and behaviors. Q 7 min safety checks maintained.

## 2023-11-10 NOTE — PLAN OF CARE
Problem: PHYSICAL THERAPY ADULT  Goal: Performs mobility at highest level of function for planned discharge setting. See evaluation for individualized goals. Description: Treatment/Interventions: LE strengthening/ROM, Therapeutic exercise, Endurance training, Patient/family training, Equipment eval/education, Gait training, Spoke to nursing  Equipment Recommended:  (none at this time; TBD)       See flowsheet documentation for full assessment, interventions and recommendations. Outcome: Not Progressing  Note: Prognosis: Good  Problem List: Decreased strength, Decreased endurance, Impaired balance, Decreased mobility, Impaired judgement, Decreased safety awareness  Assessment: Pt seen for PT treatment session this date with interventions consisting of gait training w/ emphasis on improving pt's ability to ambulate level surfaces x 80 ft with distant S provided by therapist with no AD and therapeutic activity consisting of training: bed mobility, supine<>sit transfers, and sit<>stand transfers. Pt agreeable to PT treatment session upon arrival, pt found supine in bed w/ HOB elevated, in no apparent distress and responsive. In comparison to previous session, pt with no improvements as evidenced by decreased ambulation distance . Post session: all needs in reach and RN notified of session findings/recommendations. Continue to recommend Level III (Minimum Resource Intensity) at time of d/c in order to maximize pt's functional independence and safety w/ mobility. Pt continues to be functioning below baseline level. PT will continue to see pt during current hospitalization in order to address the deficits listed above and provide interventions consistent w/ POC in effort to achieve STGs. Leydi Martinez PTA  Barriers to Discharge: None     Rehab Resource Intensity Level, PT: III (Minimum Resource Intensity)    See flowsheet documentation for full assessment.

## 2023-11-10 NOTE — PROGRESS NOTES
Progress Note - Edmundo Stevens 47 y.o. female MRN: [id number]    Unit/Bed#[facility] 208-01 Encounter: [id number]        Subjective:   Patient seen and examined at bedside after reviewing the chart and discussing the case with the caring staff. Patient complaining of constipation earlier this morning but states she just had a bowel movement 1 hour ago. She denies any abdominal pain, nausea, vomiting. Physical Exam   Vitals: Blood pressure 136/71, pulse 72, temperature 97.6 °F (36.4 °C), temperature source Temporal, resp. rate 18, height 5' 6" (1.676 m), weight 117 kg (258 lb), SpO2 96 %. ,Body mass index is 41.64 kg/m². Constitutional: Patient in no acute distress. HEENT: PERR, EOMI, MMM. Cardiovascular: Normal rate and regular rhythm. Pulmonary/Chest: Effort normal and breath sounds normal.  Abdomen: Soft, + BS, NT. Assessment/Plan:     1. Cardiac with hx of HTN, HLD. Continue Prazosin 2 mg nightly. Continue to monitor. 2.  GERD. Continue Protonix 20 mg daily. 3.  Constipation. Continue Senokot S 2 tablets daily at bedtime. Add Dulcolax 10 mg daily prn.   4.  Seizure disorder. Seizure precautions. Patient is on Depakote 1000 mg nightly. Reports last seizure was over 10 years ago. 5.  DJD/OA along with bilateral shoulder and arm pain. Tylenol as needed. Voltaren gel 4 times daily over the shoulders and arm. 6.  Dry skin. Apply ammonium lactate to bilateral feet twice daily. 7.  Vertigo. Continue meclizine 25 mg twice daily. 8.  Intertrigo. Apply nystatin powder twice daily. 9.  Vitamin D deficiency. Patient started on vitamin D bolus doses for 10 weeks followed by vitamin D3 1000 units daily. 10.  Vitamin B12 deficiency. Patient started on B12 supplements. The patient was discussed with Dr. Katerine Chow and he is in agreement with the above note.

## 2023-11-10 NOTE — NURSING NOTE
Patient appears to have slept through the overnight hours without issue in room 211, unlocked. No complaints voiced. No acute behaviors observed. Patient remains in bed sleeping at this time. No self-injurious behaviors observed. Continuous safety rounding in progress.

## 2023-11-10 NOTE — NURSING NOTE
Patient complains of increased anxiety, requesting PRN Atarax, unable to redirect verbally, with exercise, diversion activity, food/fluids. Discussed coping skills as patient also states she feels like hitting herself. Remains visible on the unit and provided safe activity for coping. PRN Atarax 50 mg given as ordered. Remains on 7" checks for safety and behaviors.

## 2023-11-10 NOTE — PLAN OF CARE
Problem: OCCUPATIONAL THERAPY ADULT  Goal: Performs self-care activities at highest level of function for planned discharge setting. See evaluation for individualized goals. Description: Treatment Interventions: ADL retraining, Functional transfer training, UE strengthening/ROM, Endurance training, Patient/family training, Energy conservation, Activityengagement          See flowsheet documentation for full assessment, interventions and recommendations. Outcome: Progressing  Note: Limitation: Decreased ADL status, Decreased UE strength, Decreased Safe judgement during ADL, Decreased endurance, Decreased high-level ADLs, Decreased UE ROM  Prognosis: Good  Assessment: Patient participated in Skilled OT session this date with interventions consisting of functional transfer training and Energy Conservation techniques . Patient agreeable to OT treatment session, upon arrival patient was found supine in bed, responsive , in no apparent distress, and sleepy. Patient transfrs supine to sit EOB with Mod I. Patient sits unsupported EOB 2-3 min with no LOB. Patient then stands and ambulates with S x 1 x 80 feet. Patient declined UB TE despite encouragement to participate. Session ended with patient seated OOB to chair and all needs met. In comparison to previous session, patient with improvements in bed mobility and functional transfers. Patient requiring verbal cues for correct technique, verbal cues for pacing thru activity steps, and frequent rest periods. Patient performance  demonstrated good carryover of learned techniques and strategies to facilitate safety during functional tasks. Patient continues to be functioning below baseline level, occupational performance remains limited secondary to factors listed above and increased risk for falls and injury. From OT standpoint, recommendation at time of d/c would be Home OT.   Patient to benefit from continued Occupational Therapy treatment while in the hospital to address deficits as defined above and maximize level of functional independence with ADLs and functional mobility in order to return to PLOF.      Rehab Resource Intensity Level, OT: III (Minimum Resource Intensity)

## 2023-11-10 NOTE — NURSING NOTE
On reassessment of PRN Tylenol, patient states she continues to have a headache; states the pain rating decreased to 9/10. PRN was helpful per patient.

## 2023-11-10 NOTE — NURSING NOTE
Prn atarax 50 mg ineffective; safe activities provided for patient to cope. Remains visible in milieu. Will continue to monitor.

## 2023-11-11 PROCEDURE — 99232 SBSQ HOSP IP/OBS MODERATE 35: CPT | Performed by: PSYCHIATRY & NEUROLOGY

## 2023-11-11 RX ADMIN — ERGOCALCIFEROL 50000 UNITS: 1.25 CAPSULE, LIQUID FILLED ORAL at 08:38

## 2023-11-11 RX ADMIN — PANTOPRAZOLE SODIUM 20 MG: 20 TABLET, DELAYED RELEASE ORAL at 06:14

## 2023-11-11 RX ADMIN — RISPERIDONE 3 MG: 2 TABLET ORAL at 21:42

## 2023-11-11 RX ADMIN — DICLOFENAC SODIUM 4 G: 10 GEL TOPICAL at 17:01

## 2023-11-11 RX ADMIN — SENNOSIDES AND DOCUSATE SODIUM 2 TABLET: 8.6; 5 TABLET ORAL at 21:41

## 2023-11-11 RX ADMIN — MECLIZINE 25 MG: 12.5 TABLET ORAL at 17:01

## 2023-11-11 RX ADMIN — TRAZODONE HYDROCHLORIDE 100 MG: 100 TABLET ORAL at 21:42

## 2023-11-11 RX ADMIN — CYANOCOBALAMIN TAB 500 MCG 500 MCG: 500 TAB at 08:38

## 2023-11-11 RX ADMIN — DICLOFENAC SODIUM 4 G: 10 GEL TOPICAL at 21:41

## 2023-11-11 RX ADMIN — Medication 3 MG: at 21:42

## 2023-11-11 RX ADMIN — FLUOXETINE 60 MG: 20 CAPSULE ORAL at 08:38

## 2023-11-11 RX ADMIN — Medication 1 APPLICATION: at 21:41

## 2023-11-11 RX ADMIN — RISPERIDONE 2 MG: 2 TABLET ORAL at 08:38

## 2023-11-11 RX ADMIN — HYDROXYZINE HYDROCHLORIDE 50 MG: 50 TABLET, FILM COATED ORAL at 15:56

## 2023-11-11 RX ADMIN — PRAZOSIN HYDROCHLORIDE 2 MG: 1 CAPSULE ORAL at 21:41

## 2023-11-11 RX ADMIN — DIVALPROEX SODIUM 1000 MG: 500 TABLET, FILM COATED, EXTENDED RELEASE ORAL at 21:49

## 2023-11-11 RX ADMIN — MECLIZINE 25 MG: 12.5 TABLET ORAL at 08:38

## 2023-11-11 NOTE — NURSING NOTE
Patient appears to have slept through the overnight hours without issue. No complaints voiced. No acute behaviors observed. Patient remains in a recliner nearby the nurses station, sleeping at this time. Continuous safety rounding in progress.

## 2023-11-11 NOTE — PLAN OF CARE
Problem: SELF HARM/SUICIDALITY  Goal: Will have no self-injury during hospital stay  Description: INTERVENTIONS:  - Q 15 MINUTES: Routine safety checks  - Q WAKING SHIFT & PRN: Assess risk to determine if routine checks are adequate to maintain patient safety  - Encourage patient to participate actively in care by formulating a plan to combat response to suicidal ideation, identify supports and resources  Outcome: Progressing     Problem: DEPRESSION  Goal: Will be euthymic at discharge  Description: INTERVENTIONS:  - Administer medication as ordered  - Provide emotional support via 1:1 interaction with staff  - Encourage involvement in milieu/groups/activities  - Monitor for social isolation  Outcome: Progressing     Problem: Alteration in Thoughts and Perception  Goal: Treatment Goal: Gain control of psychotic behaviors/thinking, reduce/eliminate presenting symptoms and demonstrate improved reality functioning upon discharge  Outcome: Progressing  Goal: Verbalize thoughts and feelings  Description: Interventions:  - Promote a nonjudgmental and trusting relationship with the patient through active listening and therapeutic communication  - Assess patient's level of functioning, behavior and potential for risk  - Engage patient in 1 on 1 interactions  - Encourage patient to express fears, feelings, frustrations, and discuss symptoms    - [place] patient to reality, help patient recognize reality-based thinking   - Administer medications as ordered and assess for potential side effects  - Provide the patient education related to the signs and symptoms of the illness and desired effects of prescribed medications  Outcome: Progressing  Goal: Refrain from acting on delusional thinking/internal stimuli  Description: Interventions:  - Monitor patient closely, per order   - Utilize least restrictive measures   - Set reasonable limits, give positive feedback for acceptable   - Administer medications as ordered and monitor of potential side effects  Outcome: Progressing  Goal: Agree to be compliant with medication regime, as prescribed and report medication side effects  Description: Interventions:  - Offer appropriate PRN medication and supervise ingestion; conduct AIMS, as needed   Outcome: Progressing  Goal: Attend and participate in unit activities, including therapeutic, recreational, and educational groups  Description: Interventions:  -Encourage Visitation and family involvement in care  Outcome: Progressing  Goal: Recognize dysfunctional thoughts, communicate reality-based thoughts at the time of discharge  Description: Interventions:  - Provide medication and psycho-education to assist patient in compliance and developing insight into his/her illness   Outcome: Progressing  Goal: Complete daily ADLs, including personal hygiene independently, as able  Description: Interventions:  - Observe, teach, and assist patient with ADLS  - Monitor and promote a balance of rest/activity, with adequate nutrition and elimination   Outcome: Progressing     Problem: Ineffective Coping  Goal: Identifies ineffective coping skills  Outcome: Progressing  Goal: Identifies healthy coping skills  Outcome: Progressing  Goal: Demonstrates healthy coping skills  Outcome: Progressing  Goal: Participates in unit activities  Description: Interventions:  - Provide therapeutic environment   - Provide required programming   - Redirect inappropriate behaviors   Outcome: Progressing  Goal: Patient/Family participate in treatment and DC plans  Description: Interventions:  - Provide therapeutic environment  Outcome: Progressing  Goal: Patient/Family verbalizes awareness of resources  Outcome: Progressing  Goal: Understands least restrictive measures  Description: Interventions:  - Utilize least restrictive behavior  Outcome: Progressing  Goal: Free from restraint events  Description: - Utilize least restrictive measures   - Provide behavioral interventions   - Redirect inappropriate behaviors   Outcome: Progressing     Problem: Anxiety  Goal: Anxiety is at manageable level  Description: Interventions:  - Assess and monitor patient's anxiety level. - Monitor for signs and symptoms (heart palpitations, chest pain, shortness of breath, headaches, nausea, feeling jumpy, restlessness, irritable, apprehensive). - Collaborate with interdisciplinary team and initiate plan and interventions as ordered. - [place] patient to unit/surroundings  - Explain treatment plan  - Encourage participation in care  - Encourage verbalization of concerns/fears  - Identify coping mechanisms  - Assist in developing anxiety-reducing skills  - Administer/offer alternative therapies  - Limit or eliminate stimulants  Outcome: Not Progressing     Problem: DISCHARGE PLANNING - CARE MANAGEMENT  Goal: Discharge to post-acute care or home with appropriate resources  Description: INTERVENTIONS:  - Conduct assessment to determine patient/family and health care team treatment goals, and need for post-acute services based on payer coverage, community resources, and patient preferences, and barriers to discharge  - Address psychosocial, clinical, and financial barriers to discharge as identified in assessment in conjunction with the patient/family and health care team  - Arrange appropriate level of post-acute services according to patient’s   needs and preference and payer coverage in collaboration with the physician and health care team  - Communicate with and update the patient/family, physician, and health care team regarding progress on the discharge plan  - Arrange appropriate transportation to post-acute venues  Outcome: Progressing     Problem: Nutrition/Hydration-ADULT  Goal: Nutrient/Hydration intake appropriate for improving, restoring or maintaining nutritional needs  Description: Monitor and assess patient's nutrition/hydration status for malnutrition.  Collaborate with interdisciplinary team and initiate plan and interventions as ordered. Monitor patient's weight and dietary intake as ordered or per policy. Utilize nutrition screening tool and intervene as necessary. Determine patient's food preferences and provide high-protein, high-caloric foods as appropriate.      INTERVENTIONS:  - Monitor oral intake, urinary output, labs, and treatment plans  - Assess nutrition and hydration status and recommend course of action  - Evaluate amount of meals eaten  - Assist patient with eating if necessary   - Allow adequate time for meals  - Recommend/ encourage appropriate diets, oral nutritional supplements, and vitamin/mineral supplements  - Order, calculate, and assess calorie counts as needed  - Recommend, monitor, and adjust tube feedings and TPN/PPN based on assessed needs  - Assess need for intravenous fluids  - Provide specific nutrition/hydration education as appropriate  - Include patient/family/caregiver in decisions related to nutrition  Outcome: Progressing

## 2023-11-11 NOTE — NURSING NOTE
Patient was observed to be visible in the community this evening; sitting in a chair near the nurses station. She has been calm and cooperative during staff interactions. She states she always has severe anxiety and depression, denies HI. She continues to have AH of voices with negative remarks that she is fat and ugly. Ongoing passive SI with intermittent thoughts to hit herself. No self-injurious behaviors observed. Patient continues to agree to talk with staff if her feeling change; positive reinforcement made verbally. Leydi was medication compliant at . For , patient is agreeable to staying visible by sleeping in a recliner chair nearby the nurses station. Continuous safety rounding in progress. Non-skid footwear remains in place b/l.

## 2023-11-11 NOTE — NURSING NOTE
Patient has been visible in the milieu this morning. Her mood remains labile. She is calm and cooperative. She endorses wanting to set goals so she can get some of her clothing back. She displays positive ideas and denies thoughts of self harm. She reports wanting to have a good day. She does continue to verbalize feeling over stimulated when the unit is loud. She has been up and ambulatory without issue. She is able to make her needs known and offers no current complaints/ concerns. Plan of care continues. Q7 minute safety checks in progress.

## 2023-11-11 NOTE — TREATMENT TEAM
11/11/23 1601   Smith Anxiety Scale   Anxious Mood 1   Tension 1   Fears 1   Insomnia 1   Intellectual 1   Depressed Mood 1   Somatic Complaints: Muscular 1   Somatic Complaints: Sensory 1   Cardiovascular Symptoms 1   Respiratory Symptoms 1   Gastrointestinal Symptoms 1   Genitourinary Symptoms 1   Autonomic Symptoms 1   Behavior at Interview 1   Smith Anxiety Score 14     Atarax 50 mg administered as ordered per pt's request for mild anxiety. Pt observed in room. Pt appeared anxious and tensed. Stated that she's feeling anxious r/t the unit. Pt attempted coping skills working in her puzzle book. Will continue to monitor. 7 minute safety checks continued.

## 2023-11-11 NOTE — NURSING NOTE
Follow up Atarax 50 mg pt stated that she's feeling better and appears calmer. Pt is in dinning area socializing with peers. Will continue to monitor. 7 minute safety checks continued.

## 2023-11-11 NOTE — PROGRESS NOTES
Progress Note - Behavioral Health     Leydi Khan 47 y.o. female MRN: [id number]   Unit/Bed#: [facility] 208-01 Encounter: [id number]    Behavior over the last 24 hours: limited improvement. Sleep:  slept better  Appetite: normal  Medication side effects: Denies. ROS: all other systems are negative    Mental Status Evaluation:    Appearance:  age appropriate, overweight   Behavior:  less guarded, responds to redirection   Speech:  normal rate, normal volume   Mood:  "ok"   Affect:  constricted   Thought Process:  concrete   Associations: concrete associations   Thought Content:  mild paranoia   Perceptual Disturbances: auditory hallucinations with derogatory comments   Risk Potential: Suicidal ideation - Yes, passive death wish, contracts for safety on the unit  Homicidal ideation - None   Sensorium:  oriented to person, place, and time/date   Memory:  recent and remote memory grossly intact   Consciousness:  alert and awake   Attention: attention span and concentration are age appropriate   Insight:  limited   Judgment: limited   Gait/Station: normal gait/station   Motor Activity: no abnormal movements     Vital signs in last 24 hours:    Temp:  [97.7 °F (36.5 °C)-98.3 °F (36.8 °C)] 98.3 °F (36.8 °C)  HR:  [66-81] 73  Resp:  [16-18] 18  BP: (113-137)/(55-63) 137/63    Laboratory results: I have personally reviewed all pertinent laboratory/tests results.   Most Recent Labs:   Lab Results   Component Value Date    WBC 7.36 11/03/2023    RBC 3.82 11/03/2023    HGB 11.9 11/03/2023    HCT 36.5 11/03/2023     11/03/2023    RDW 14.5 11/03/2023    TOTANEUTABS 5.83 12/13/2022    NEUTROABS 4.33 11/03/2023    SODIUM 134 (L) 11/03/2023    K 4.2 11/03/2023    CL 97 11/03/2023    CO2 31 11/03/2023    BUN 13 11/03/2023    CREATININE 0.50 (L) 11/03/2023    GLUC 84 11/03/2023    GLUF 130 (H) 12/13/2022    CALCIUM 9.2 11/03/2023    AST 10 (L) 11/02/2023    ALT 10 11/02/2023    ALKPHOS 58 11/02/2023    TP 6.9 11/02/2023    ALB 4.1 11/02/2023    TBILI 0.34 11/02/2023    CHOLESTEROL 212 (H) 11/03/2023    HDL 42 (L) 11/03/2023    TRIG 223 (H) 11/03/2023    LDLCALC 125 (H) 11/03/2023    [address] 11/03/2023    VALPROICTOT 13 (L) 08/10/2023    MEH6CQUQBRKT 3.882 11/02/2023    PREGUR negative 11/06/2022    PREGSERUM Negative 04/28/2022    RPR Non-Reactive 12/13/2022    HGBA1C 4.5 08/08/2023    EAG 82 08/08/2023       Assessment/Plan   Principal Problem:    Major depression with psychotic features ([address])  Active Problems:    Primary hypertension    Hyperlipidemia    Class 3 severe obesity due to excess calories without serious comorbidity in [facility])    Chronic midline low back pain without sciatica    PTSD (post-traumatic stress disorder)    Vertigo    Seizures (HCC)    Insomnia    Anemia    Recommended Treatment:     Planned medication and treatment changes:     All current active medications have been reviewed  Encourage group therapy, milieu therapy and occupational therapy  Behavioral Health checks every 7 minutes  Requires continued inpatient treatment due to chronic illness and high risk of decompensation if discharged before long term stability is achieved     Current Facility-Administered Medications   Medication Dose Route Frequency Provider Last Rate    acetaminophen  650 mg Oral Q4H PRN Sarah L Dagnall, PA-C      acetaminophen  650 mg Oral Q6H PRN Sarah L Dagnall, PA-C      acetaminophen  975 mg Oral Q6H PRN Sarah L Dagnall, PA-C      aluminum-magnesium hydroxide-simethicone  30 mL Oral Q4H PRN Socorro Rush MD      ammonium lactate   Topical BID Sarah L Dagnall, PA-C      benztropine  1 mg Oral BID PRN Tien Leblanc MD      bisacodyl  10 mg Oral Daily PRN Sarah L Woody, PANormaC      [START ON 1/6/2024] cholecalciferol  1,000 Units Oral Daily Kirsty Hurtado MD      cyanocobalamin  500 mcg Oral Daily Kirsty Hurtado MD      Diclofenac Sodium  4 g Topical 4x Daily Kirsty Hurtado MD      divalproex sodium  1,000 mg Oral HS Yola Ayala MD      ergocalciferol  50,000 Units Oral Weekly Roxy Hogan MD      FLUoxetine  60 mg Oral Daily Yola Ayala MD      haloperidol lactate  5 mg Intramuscular Q4H PRN Max 4/day Ladora Olszewski, MD      hydrOXYzine HCL  50 mg Oral Q6H PRN Max 4/day Yola Ayala MD      LORazepam  1 mg Intramuscular Q6H PRN Max 3/day Ladora Olszewski, MD      LORazepam  1 mg Oral Q8H PRN Yola Ayala MD      meclizine  25 mg Oral BID Sarah L Dagnall, PA-C      melatonin  3 mg Oral HS Ladora Olszewski, MD      nicotine polacrilex  4 mg Oral Q2H PRN Ladora Olszewski, MD      nystatin   Topical BID Sarah L Dagnall, PA-C      pantoprazole  20 mg Oral Early Morning Sarah L Dagnall, PA-C      prazosin  2 mg Oral HS Sarah L Dagnall, PA-C      risperiDONE  0.25 mg Oral Q4H PRN Max 6/day Ladora Olszewski, MD      risperiDONE  0.5 mg Oral Q4H PRN Max 3/day SHANI Browning      risperiDONE  1 mg Oral Q2H PRN Max 3/day Ladora Olszewski, MD      risperiDONE  2 mg Oral Daily Yola Ayala MD      risperiDONE  3 mg Oral HS Yola Ayala MD      senna-docusate sodium  2 tablet Oral HS Sarah L Dagnall, PA-C      traZODone  100 mg Oral HS Yola Ayala MD         Risks / Benefits of Treatment:    Risks, benefits, and possible side effects of medications explained to patient and patient verbalizes understanding and agreement for treatment. Counseling / Coordination of Care:    Patient's progress discussed with staff in treatment team meeting. Medications, treatment progress and treatment plan reviewed with patient. Supportive therapy provided to patient. Group attendance encouraged.     Jeremie Harvey MD 11/11/23

## 2023-11-11 NOTE — PROGRESS NOTES
Progress Note - Mai Diaz 47 y.o. female MRN: [id number]    Unit/Bed#[facility]01 Encounter: [id number]        Subjective:   Patient seen and examined at bedside after reviewing the chart and discussing the case with the caring staff. Patient examined at bedside. Patient reports no acute symptoms. Physical Exam   Vitals: Blood pressure 113/58, pulse 66, temperature 97.7 °F (36.5 °C), temperature source Temporal, resp. rate 18, height 5' 6" (1.676 m), weight 118 kg (260 lb 6.4 oz), SpO2 98 %. ,Body mass index is 42.03 kg/m². Constitutional: Patient in no acute distress. HEENT: PERR, EOMI, MMM. Cardiovascular: Normal rate and regular rhythm. Pulmonary/Chest: Effort normal and breath sounds normal.  Abdomen: Soft, + BS, NT. Assessment/Plan:     1. Cardiac with hx of HTN, HLD. Continue Prazosin 2 mg nightly. Continue to monitor. 2.  GERD. Continue Protonix 20 mg daily. 3.  Constipation. Continue Senokot S 2 tablets daily at bedtime. Add Dulcolax 10 mg daily prn.   4.  Seizure disorder. Seizure precautions. Patient is on Depakote 1000 mg nightly. Reports last seizure was over 10 years ago. 5.  DJD/OA along with bilateral shoulder and arm pain. Tylenol as needed. Voltaren gel 4 times daily over the shoulders and arm. 6.  Dry skin. Apply ammonium lactate to bilateral feet twice daily. 7.  Vertigo. Continue meclizine 25 mg twice daily. 8.  Intertrigo. Apply nystatin powder twice daily. 9.  Vitamin D deficiency. Patient started on vitamin D bolus doses for 10 weeks followed by vitamin D3 1000 units daily. 10.  Vitamin B12 deficiency. Patient started on B12 supplements.

## 2023-11-12 LAB
BILIRUB UR QL STRIP: NEGATIVE
CLARITY UR: CLEAR
COLOR UR: YELLOW
GLUCOSE UR STRIP-MCNC: NEGATIVE MG/DL
HGB UR QL STRIP.AUTO: NEGATIVE
KETONES UR STRIP-MCNC: NEGATIVE MG/DL
LEUKOCYTE ESTERASE UR QL STRIP: NEGATIVE
NITRITE UR QL STRIP: NEGATIVE
PH UR STRIP.AUTO: 6.5 [PH]
PROT UR STRIP-MCNC: NEGATIVE MG/DL
SP GR UR STRIP.AUTO: 1.02
UROBILINOGEN UR QL STRIP.AUTO: 1 E.U./DL

## 2023-11-12 PROCEDURE — 81003 URINALYSIS AUTO W/O SCOPE: CPT | Performed by: FAMILY MEDICINE

## 2023-11-12 PROCEDURE — 99232 SBSQ HOSP IP/OBS MODERATE 35: CPT | Performed by: PSYCHIATRY & NEUROLOGY

## 2023-11-12 RX ADMIN — FLUOXETINE 60 MG: 20 CAPSULE ORAL at 08:16

## 2023-11-12 RX ADMIN — RISPERIDONE 3 MG: 2 TABLET ORAL at 21:17

## 2023-11-12 RX ADMIN — TRAZODONE HYDROCHLORIDE 100 MG: 100 TABLET ORAL at 21:17

## 2023-11-12 RX ADMIN — RISPERIDONE 2 MG: 2 TABLET ORAL at 08:16

## 2023-11-12 RX ADMIN — NYSTATIN: 100000 POWDER TOPICAL at 13:50

## 2023-11-12 RX ADMIN — MECLIZINE 25 MG: 12.5 TABLET ORAL at 17:15

## 2023-11-12 RX ADMIN — SENNOSIDES AND DOCUSATE SODIUM 2 TABLET: 8.6; 5 TABLET ORAL at 21:17

## 2023-11-12 RX ADMIN — PRAZOSIN HYDROCHLORIDE 2 MG: 1 CAPSULE ORAL at 21:17

## 2023-11-12 RX ADMIN — CYANOCOBALAMIN TAB 500 MCG 500 MCG: 500 TAB at 08:16

## 2023-11-12 RX ADMIN — DIVALPROEX SODIUM 1000 MG: 500 TABLET, FILM COATED, EXTENDED RELEASE ORAL at 21:17

## 2023-11-12 RX ADMIN — MECLIZINE 25 MG: 12.5 TABLET ORAL at 08:16

## 2023-11-12 RX ADMIN — PANTOPRAZOLE SODIUM 20 MG: 20 TABLET, DELAYED RELEASE ORAL at 06:11

## 2023-11-12 RX ADMIN — Medication 3 MG: at 21:17

## 2023-11-12 NOTE — NURSING NOTE
Pt was visible on the unit this evening, social w/ staff. Pt endorsed mild anxiety and depression, denied all other psych symptoms. Pt was flat but pleasant, cooperative, and med compliant. Will CTM. Continuous pt safety checks ongoing.

## 2023-11-12 NOTE — PLAN OF CARE
Problem: Alteration in Thoughts and Perception  Goal: Verbalize thoughts and feelings  Description: Interventions:  - Promote a nonjudgmental and trusting relationship with the patient through active listening and therapeutic communication  - Assess patient's level of functioning, behavior and potential for risk  - Engage patient in 1 on 1 interactions  - Encourage patient to express fears, feelings, frustrations, and discuss symptoms    - [place] patient to reality, help patient recognize reality-based thinking   - Administer medications as ordered and assess for potential side effects  - Provide the patient education related to the signs and symptoms of the illness and desired effects of prescribed medications  Outcome: Progressing     Problem: Ineffective Coping  Goal: Understands least restrictive measures  Description: Interventions:  - Utilize least restrictive behavior  Outcome: Progressing     Problem: Ineffective Coping  Goal: Free from restraint events  Description: - Utilize least restrictive measures   - Provide behavioral interventions   - Redirect inappropriate behaviors   Outcome: Progressing

## 2023-11-12 NOTE — NURSING NOTE
BLEPS assessment has been completed. Bowel- Last BM 11/12/23  Lungs- Clear  Extremities- Non pitting edema to b/l lower extremities. Pt encouraged to elevate her legs/ feet at time of rest- which she is currently doing. Pulses- Palpable  Skin- Dry skin on b/l feet- pt is currently utilizing ammonium lactate at HS.

## 2023-11-12 NOTE — NURSING NOTE
Patient has been visible in the milieu today. She is pleasant and cooperative. She reports feeling "better" today. Endorses wanting to "do good" so she can go back home. She continues to endorse positive thinking and wanting to gain back unit privileges. She denies Si/Hi and hallucinations. She offers no current complaints/ concerns. Plan of care continues. Q7 minute safety checks in progress.

## 2023-11-12 NOTE — PROGRESS NOTES
Progress Note - Behavioral Health     Leydi Khan 47 y.o. female MRN: [id number]   Unit/Bed#: [facility] 208-01 Encounter: [id number]    Behavior over the last 24 hours: limited improvement. Leydi was seen today for continuing care. She reports feeling better and willing to behave better so she can return her place soon. She has not been hitting or scratching herself for the past  2 days. Denies current AH and was noted coloring appropriately. Does not verbalizes plan or intent to hurt herself and is able to contract for safety. Continues with episodes of anxiety and takes prn meds. Staff report pt is withdrawn today with limited interaction in groups and on the unit. Sleep:  improved  Appetite: normal  Medication side effects: Denies. ROS: all other systems are negative    Mental Status Evaluation:    Appearance:  age appropriate, overweight   Behavior:  guarded, responds to redirection   Speech:  normal rate, normal volume   Mood:  "better"   Affect:  brighter   Thought Process:  concrete   Associations: concrete associations   Thought Content:  mild paranoia   Perceptual Disturbances: denies auditory hallucinations when asked   Risk Potential: Suicidal ideation - None at present, contracts for safety on the unit  Homicidal ideation - None   Sensorium:  oriented to person, place, and time/date   Memory:  recent and remote memory grossly intact   Consciousness:  alert and awake   Attention: attention span and concentration are age appropriate   Insight:  limited   Judgment: limited   Gait/Station: normal gait/station   Motor Activity: no abnormal movements     Vital signs in last 24 hours:    Temp:  [98 °F (36.7 °C)-98.4 °F (36.9 °C)] 98 °F (36.7 °C)  HR:  [73-76] 76  Resp:  [18] 18  BP: (135-150)/(63-70) 150/69    Laboratory results: I have personally reviewed all pertinent laboratory/tests results.   Most Recent Labs:   Lab Results   Component Value Date    WBC 7.36 11/03/2023    RBC 3.82 11/03/2023    HGB 11.9 11/03/2023    HCT 36.5 11/03/2023     11/03/2023    RDW 14.5 11/03/2023    TOTANEUTABS 5.83 12/13/2022    NEUTROABS 4.33 11/03/2023    SODIUM 134 (L) 11/03/2023    K 4.2 11/03/2023    CL 97 11/03/2023    CO2 31 11/03/2023    BUN 13 11/03/2023    CREATININE 0.50 (L) 11/03/2023    GLUC 84 11/03/2023    GLUF 130 (H) 12/13/2022    CALCIUM 9.2 11/03/2023    AST 10 (L) 11/02/2023    ALT 10 11/02/2023    ALKPHOS 58 11/02/2023    TP 6.9 11/02/2023    ALB 4.1 11/02/2023    TBILI 0.34 11/02/2023    CHOLESTEROL 212 (H) 11/03/2023    HDL 42 (L) 11/03/2023    TRIG 223 (H) 11/03/2023    LDLCALC 125 (H) 11/03/2023    [address] 11/03/2023    VALPROICTOT 13 (L) 08/10/2023    OAN4RXINBHAY 3.882 11/02/2023    PREGUR negative 11/06/2022    PREGSERUM Negative 04/28/2022    RPR Non-Reactive 12/13/2022    HGBA1C 4.5 08/08/2023    EAG 82 08/08/2023       Assessment/Plan   Principal Problem:    Major depression with psychotic features ([address])  Active Problems:    Primary hypertension    Hyperlipidemia    Class 3 severe obesity due to excess calories without serious comorbidity in adult [facility])    Chronic midline low back pain without sciatica    PTSD (post-traumatic stress disorder)    Vertigo    Seizures (HCC)    Insomnia    Anemia    Recommended Treatment:     Planned medication and treatment changes:     All current active medications have been reviewed  Encourage group therapy, milieu therapy and occupational therapy  Behavioral Health checks every 7 minutes  Requires continued inpatient treatment due to chronic illness and high risk of decompensation if discharged before long term stability is achieved     Current Facility-Administered Medications   Medication Dose Route Frequency Provider Last Rate    acetaminophen  650 mg Oral Q4H PRN Sarah Mckay PA-C      acetaminophen  650 mg Oral Q6H PRN Sarah Mckay PA-C      acetaminophen  975 mg Oral Q6H PRN Sarah Mckay PA-C      aluminum-magnesium hydroxide-simethicone 30 mL Oral Q4H PRN Yanet Blue MD      ammonium lactate   Topical BID Sarah L Dagnall, PA-C      benztropine  1 mg Oral BID PRN Abner Cordero MD      bisacodyl  10 mg Oral Daily PRN Sarah L Dagnall, PA-C      [START ON 1/6/2024] cholecalciferol  1,000 Units Oral Daily Norma Graham MD      cyanocobalamin  500 mcg Oral Daily Norma Graham MD      Diclofenac Sodium  4 g Topical 4x Daily Norma Graham MD      divalproex sodium  1,000 mg Oral HS Abner Cordero MD      ergocalciferol  50,000 Units Oral Weekly Norma Graham MD      FLUoxetine  60 mg Oral Daily Abner Cordero MD      haloperidol lactate  5 mg Intramuscular Q4H PRN Max 4/day Yanet Blue MD      hydrOXYzine HCL  50 mg Oral Q6H PRN Max 4/day Abner Cordero MD      LORazepam  1 mg Intramuscular Q6H PRN Max 3/day Yanet Blue MD      LORazepam  1 mg Oral Q8H PRN Abner Cordero MD      meclizine  25 mg Oral BID Sarah L Dagnall, PA-C      melatonin  3 mg Oral HS Yanet Blue MD      nicotine polacrilex  4 mg Oral Q2H PRN Yanet Blue MD      nystatin   Topical BID Sarah L Dagnall, PA-C      pantoprazole  20 mg Oral Early Morning Sarah L Dagnall, PA-C      prazosin  2 mg Oral HS Sarah L Stoneynall, PA-C      risperiDONE  0.25 mg Oral Q4H PRN Max 6/day Yanet Blue MD      risperiDONE  0.5 mg Oral Q4H PRN Max 3/day SHANI Wilson      risperiDONE  1 mg Oral Q2H PRN Max 3/day Yanet Blue MD      risperiDONE  2 mg Oral Daily Abner Cordero MD      risperiDONE  3 mg Oral HS Abner Cordero MD      senna-docusate sodium  2 tablet Oral HS Sarah L Dagnall, PA-C      traZODone  100 mg Oral HS Abner Cordero MD         Risks / Benefits of Treatment:    Risks, benefits, and possible side effects of medications explained to patient and patient verbalizes understanding and agreement for treatment. Counseling / Coordination of Care:    Patient's progress discussed with staff in treatment team meeting.   Medications, treatment progress and treatment plan reviewed with patient. Supportive therapy provided to patient. Group attendance encouraged.     Madelin Camejo MD 11/12/23

## 2023-11-12 NOTE — PROGRESS NOTES
Progress Note - Leonardo Johnston 47 y.o. female MRN: [id number]    Unit/Bed#[facility] 208-01 Encounter: [id number]        Subjective:   Patient seen and examined at bedside after reviewing the chart and discussing the case with the caring staff. Patient examined at bedside. Patient is complaining of dysuria which is new. Follow-up urine analysis. Physical Exam   Vitals: Blood pressure 121/61, pulse 76, temperature 97.5 °F (36.4 °C), temperature source Temporal, resp. rate 18, height 5' 6" (1.676 m), weight 118 kg (260 lb 6.4 oz), SpO2 99 %. ,Body mass index is 42.03 kg/m². Constitutional: Patient in no acute distress. HEENT: PERR, EOMI, MMM. Cardiovascular: Normal rate and regular rhythm. Pulmonary/Chest: Effort normal and breath sounds normal.  Abdomen: Soft, + BS, NT. Assessment/Plan:     1. Cardiac with hx of HTN, HLD. Continue Prazosin 2 mg nightly. Continue to monitor. 2.  GERD. Continue Protonix 20 mg daily. 3.  Constipation. Continue Senokot S 2 tablets daily at bedtime. Add Dulcolax 10 mg daily prn.   4.  Seizure disorder. Seizure precautions. Patient is on Depakote 1000 mg nightly. Reports last seizure was over 10 years ago. 5.  DJD/OA along with bilateral shoulder and arm pain. Tylenol as needed. Voltaren gel 4 times daily over the shoulders and arm. 6.  Dry skin. Apply ammonium lactate to bilateral feet twice daily. 7.  Vertigo. Continue meclizine 25 mg twice daily. 8.  Intertrigo. Apply nystatin powder twice daily. 9.  Vitamin D deficiency. Patient started on vitamin D bolus doses for 10 weeks followed by vitamin D3 1000 units daily. 10.  Vitamin B12 deficiency. Patient started on B12 supplements. 11.  Dysuria. I will get urine analysis to rule out UTI.

## 2023-11-13 PROCEDURE — 99232 SBSQ HOSP IP/OBS MODERATE 35: CPT | Performed by: PSYCHIATRY & NEUROLOGY

## 2023-11-13 PROCEDURE — 97110 THERAPEUTIC EXERCISES: CPT

## 2023-11-13 RX ADMIN — Medication 3 MG: at 20:56

## 2023-11-13 RX ADMIN — TRAZODONE HYDROCHLORIDE 100 MG: 100 TABLET ORAL at 20:58

## 2023-11-13 RX ADMIN — NYSTATIN: 100000 POWDER TOPICAL at 09:35

## 2023-11-13 RX ADMIN — PRAZOSIN HYDROCHLORIDE 2 MG: 1 CAPSULE ORAL at 20:57

## 2023-11-13 RX ADMIN — RISPERIDONE 3 MG: 2 TABLET ORAL at 20:57

## 2023-11-13 RX ADMIN — CYANOCOBALAMIN TAB 500 MCG 500 MCG: 500 TAB at 09:35

## 2023-11-13 RX ADMIN — MECLIZINE 25 MG: 12.5 TABLET ORAL at 09:36

## 2023-11-13 RX ADMIN — DICLOFENAC SODIUM 4 G: 10 GEL TOPICAL at 20:55

## 2023-11-13 RX ADMIN — FLUOXETINE 60 MG: 20 CAPSULE ORAL at 09:35

## 2023-11-13 RX ADMIN — Medication: at 09:36

## 2023-11-13 RX ADMIN — MECLIZINE 25 MG: 12.5 TABLET ORAL at 17:01

## 2023-11-13 RX ADMIN — SENNOSIDES AND DOCUSATE SODIUM 2 TABLET: 8.6; 5 TABLET ORAL at 20:58

## 2023-11-13 RX ADMIN — RISPERIDONE 2 MG: 2 TABLET ORAL at 09:35

## 2023-11-13 RX ADMIN — DIVALPROEX SODIUM 1000 MG: 500 TABLET, FILM COATED, EXTENDED RELEASE ORAL at 20:56

## 2023-11-13 NOTE — PROGRESS NOTES
Progress Note - Behavioral Health     Leydi Khan 47 y.o. female MRN: [id number]   Unit/Bed#: [facility] 208-01 Encounter: [id number]    Behavior over the last 24 hours: improving. Leydi was seen for continuing care today, appears mild nervous, restless and states that the noise from the other patients has been bothering her. She says that they have been causing her some increased worries. She says she is still experiencing some anxiety but overall she says she has been feeling better since admission. She is eager to go home and is hoping to do so soon. She states she has been sleeping and eating better and feels her energy reflects that. Staff report fair participation in groups and on the unit. Sleep: improved  Appetite: normal  Medication side effects: denies  ROS: no complaints, all other systems are negative    Mental Status Evaluation:    Appearance:  dressed appropriately   Behavior:  cooperative   Speech:  normal rate and volume   Mood:  anxious mild   Affect:  normal range and intensity   Thought Process:  organized   Associations: intact associations   Thought Content:  mild paranoia   Perceptual Disturbances: denies auditory hallucinations when asked   Risk Potential: Suicidal ideation - None at present  Homicidal ideation - None at present   Sensorium:  oriented to person, place, and time/date   Memory:  recent and remote memory grossly intact   Consciousness:  alert and awake   Attention: attention span and concentration are age appropriate   Insight:  limited   Judgment: fair   Gait/Station: normal gait/station   Motor Activity: no abnormal movements     Vital signs in last 24 hours:    Temp:  [97.2 °F (36.2 °C)-97.7 °F (36.5 °C)] 97.2 °F (36.2 °C)  HR:  [76-93] 93  Resp:  [18] 18  BP: (120-124)/(59-61) 120/59    Laboratory results: I have personally reviewed all pertinent laboratory/tests results.   Most Recent Labs:   Lab Results   Component Value Date    WBC 7.36 11/03/2023    RBC 3.82 11/03/2023 HGB 11.9 11/03/2023    HCT 36.5 11/03/2023     11/03/2023    RDW 14.5 11/03/2023    TOTANEUTABS 5.83 12/13/2022    NEUTROABS 4.33 11/03/2023    SODIUM 134 (L) 11/03/2023    K 4.2 11/03/2023    CL 97 11/03/2023    CO2 31 11/03/2023    BUN 13 11/03/2023    CREATININE 0.50 (L) 11/03/2023    GLUC 84 11/03/2023    GLUF 130 (H) 12/13/2022    CALCIUM 9.2 11/03/2023    AST 10 (L) 11/02/2023    ALT 10 11/02/2023    ALKPHOS 58 11/02/2023    TP 6.9 11/02/2023    ALB 4.1 11/02/2023    TBILI 0.34 11/02/2023    CHOLESTEROL 212 (H) 11/03/2023    HDL 42 (L) 11/03/2023    TRIG 223 (H) 11/03/2023    LDLCALC 125 (H) 11/03/2023    [address] 170 11/03/2023    VALPROICTOT 13 (L) 08/10/2023    [id number] 3.882 11/02/2023    PREGUR negative 11/06/2022    PREGSERUM Negative 04/28/2022    RPR Non-Reactive 12/13/2022    HGBA1C 4.5 08/08/2023    EAG 82 08/08/2023       Assessment/Plan   Principal Problem:    Major depression with psychotic features ([address])  Active Problems:    Primary hypertension    Hyperlipidemia    Class 3 severe obesity due to excess calories without serious comorbidity in [facility])    Chronic midline low back pain without sciatica    PTSD (post-traumatic stress disorder)    Vertigo    Seizures (HCC)    Insomnia    Anemia    Recommended Treatment:     Planned medication and treatment changes:     All current active medications have been reviewed  Encourage group therapy, milieu therapy and occupational therapy  Behavioral Health checks every 7 minutes  Possible discharge in a few days if continues to improve    Current Facility-Administered Medications   Medication Dose Route Frequency Provider Last Rate    acetaminophen  650 mg Oral Q4H PRN Sarah L Dagnall, PA-C      acetaminophen  650 mg Oral Q6H PRN Sarah L Dagnall, PA-C      acetaminophen  975 mg Oral Q6H PRN Sarah Mckay PA-C      aluminum-magnesium hydroxide-simethicone  30 mL Oral Q4H PRN Jemal Vera MD      ammonium lactate   Topical BID Sarah Mckay, PA-C      benztropine  1 mg Oral BID PRN Carlo Jimenez MD      bisacodyl  10 mg Oral Daily PRN Sarah L Roel PA-C      [START ON 1/6/2024] cholecalciferol  1,000 Units Oral Daily Carroll Rapp MD      cyanocobalamin  500 mcg Oral Daily Carroll Rapp MD      Diclofenac Sodium  4 g Topical 4x Daily Carroll Rapp MD      divalproex sodium  1,000 mg Oral HS Carlo Jimenez MD      ergocalciferol  50,000 Units Oral Weekly Carroll Rapp MD      FLUoxetine  60 mg Oral Daily Carlo Jimenez MD      haloperidol lactate  5 mg Intramuscular Q4H PRN Max 4/day Orin Mitchell MD      hydrOXYzine HCL  50 mg Oral Q6H PRN Max 4/day Carlo Jimenez MD      LORazepam  1 mg Intramuscular Q6H PRN Max 3/day Orin Mitchell MD      LORazepam  1 mg Oral Q8H PRN Carlo Jimenez MD      meclizine  25 mg Oral BID Sarah L Woody, PA-C      melatonin  3 mg Oral HS Orin Mitchell MD      nicotine polacrilex  4 mg Oral Q2H PRN Orin Mitchell MD      nystatin   Topical BID Sarah L Roel, PA-C      pantoprazole  20 mg Oral Early Morning Sarah L Stoneynall, PA-C      prazosin  2 mg Oral HS Sarah L oRel, PA-C      risperiDONE  0.25 mg Oral Q4H PRN Max 6/day Orin Mitchell MD      risperiDONE  0.5 mg Oral Q4H PRN Max 3/day SHANI Zavaleta      risperiDONE  1 mg Oral Q2H PRN Max 3/day Orin Mitchell MD      risperiDONE  2 mg Oral Daily Carlo Jimenez MD      risperiDONE  3 mg Oral HS Carlo Jimenez MD      senna-docusate sodium  2 tablet Oral HS Sarah L Roel, PA-C      traZODone  100 mg Oral HS Carlo Jimenez MD         Risks / Benefits of Treatment:    Risks, benefits, and possible side effects of medications explained to patient and patient verbalizes understanding and agreement for treatment. Counseling / Coordination of Care:    Patient's progress discussed with staff in treatment team meeting. Medications, treatment progress and treatment plan reviewed with patient.   Supportive therapy provided to patient. Group attendance encouraged.     Elmer Lopez MD 11/13/23

## 2023-11-13 NOTE — PLAN OF CARE
Problem: Alteration in Thoughts and Perception  Goal: Verbalize thoughts and feelings  Description: Interventions:  - Promote a nonjudgmental and trusting relationship with the patient through active listening and therapeutic communication  - Assess patient's level of functioning, behavior and potential for risk  - Engage patient in 1 on 1 interactions  - Encourage patient to express fears, feelings, frustrations, and discuss symptoms    - Kent patient to reality, help patient recognize reality-based thinking   - Administer medications as ordered and assess for potential side effects  - Provide the patient education related to the signs and symptoms of the illness and desired effects of prescribed medications  Outcome: Progressing

## 2023-11-13 NOTE — NURSING NOTE
Pt was visible on the unit this evening social w/ staff and peers. Pt endorsed mild anxiety and depression, denied all other psych symptoms. Pt feels ready for d/c now. Pt was pleasant, cooperative, and med compliant. Will CTM. Continuous pt safety checks ongoing.

## 2023-11-13 NOTE — OCCUPATIONAL THERAPY NOTE
11/13/23 1123   Note Type   Note Type Cancelled Session   Cancel Reasons   (unavailable)       Attempted OT treatment today at 1123. Patient unavailable due to attending group. Will continue with POC as able.   Che Wren

## 2023-11-13 NOTE — PLAN OF CARE
Problem: PHYSICAL THERAPY ADULT  Goal: Performs mobility at highest level of function for planned discharge setting. See evaluation for individualized goals. Description: Treatment/Interventions: LE strengthening/ROM, Therapeutic exercise, Endurance training, Patient/family training, Equipment eval/education, Gait training, Spoke to nursing  Equipment Recommended:  (none at this time; TBD)       See flowsheet documentation for full assessment, interventions and recommendations. Outcome: Not Progressing  Note: Prognosis: Good  Problem List: Decreased strength, Impaired balance, Decreased endurance, Decreased mobility, Impaired judgement, Decreased safety awareness, Pain  Assessment: Pt seen for PT treatment session this date with interventions consisting of BLE TE, and education provided as needed for safety and direction to improve functional mobility, safety awareness, and activity tolerance. Pt agreeable to PT treatment session upon arrival, pt found sitting in community room. At end of session, pt left sitting in community room with all needs in reach. In comparison to previous session, pt with no improvements as evidenced by no functional gains made this session due to patient deferred participation in all activity except TE . Continue to recommend Level III (Minimum Resource Intensity) at time of d/c in order to maximize pt's functional independence and safety w/ mobility. Pt continues to be functioning below baseline level. PT will continue to see pt while here in order to address the deficits listed above and provide interventions consistent w/ POC in effort to achieve STGs. Barriers to Discharge: None     Rehab Resource Intensity Level, PT: III (Minimum Resource Intensity)    See flowsheet documentation for full assessment.

## 2023-11-13 NOTE — PHYSICAL THERAPY NOTE
PHYSICAL THERAPY TREATMENT NOTE  NAME:  Hai Dickson  DATE: 11/13/23    Length Of Stay: 10  Performed at least 2 patient identifiers during session: Name and ID bracelet       11/13/23 1122   PT Last Visit   PT Visit Date 11/13/23   Note Type   Note Type Treatment   Pain Assessment   Pain Assessment Tool 0-10   Pain Score 5   Pain Location/Orientation Orientation: Bilateral;Location: Arm   Pain Onset/Description Onset: Ongoing; Descriptor: Aching   Patient's Stated Pain Goal No pain   Hospital Pain Intervention(s) Repositioned; Ambulation/increased activity   Restrictions/Precautions   Weight Bearing Precautions Per Order No   Other Precautions Fall Risk;Pain   General   Chart Reviewed Yes   Response to Previous Treatment Patient with no complaints from previous session. Family/Caregiver Present No   Cognition   Overall Cognitive Status WFL   Arousal/Participation Alert; Responsive   Attention Within functional limits   Orientation Level Oriented X4   Memory Decreased recall of precautions;Decreased recall of recent events   Following Commands Follows one step commands without difficulty   Comments Pt. initially agreeable to participate in PT treatment but after TE, pt. deferred further participation. Subjective   Subjective "I don't wanna do anymore."   Bed Mobility   Additional Comments Pt. found sitting in community room. Transfers   Sit to Stand Unable to assess  (Pt. deferred)   Ambulation/Elevation   Gait pattern Not tested   Ambulation/Elevation Additional Comments pt. deferred   Balance   Static Sitting Normal   Dynamic Sitting Good   Endurance Deficit   Endurance Deficit Yes   Activity Tolerance   Activity Tolerance Patient limited by fatigue;Patient limited by pain   Nurse Made Aware yes   Exercises   Quad Sets Sitting;15 reps;AROM; Bilateral   Heelslides Sitting;15 reps;AROM; Bilateral   Glute Sets Sitting;15 reps;AROM; Bilateral   Hip Flexion Sitting;15 reps;AROM; Bilateral   Hip Abduction Sitting;15 reps;AROM; Bilateral   Hip Adduction Sitting;15 reps;AROM; Bilateral   Knee AROM Long Arc Quad Sitting;15 reps;AROM; Bilateral   Ankle Pumps Sitting;15 reps;AROM; Bilateral   Marching Sitting;15 reps;AROM; Bilateral   Assessment   Prognosis Good   Problem List Decreased strength; Impaired balance;Decreased endurance;Decreased mobility; Impaired judgement;Decreased safety awareness;Pain   Goals   Patient Goals nothing stated   PT Treatment Day 3   Plan   Treatment/Interventions Functional transfer training;LE strengthening/ROM; Elevations; Therapeutic exercise; Endurance training;Patient/family training;Equipment eval/education; Bed mobility;Gait training; Compensatory technique education;Spoke to nursing   Progress No functional improvements   PT Frequency 2-3x/wk   Discharge Recommendation   Rehab Resource Intensity Level, PT III (Minimum Resource Intensity)   AM-PAC Basic Mobility Inpatient   Turning in Flat Bed Without Bedrails 4   Lying on Back to Sitting on Edge of Flat Bed Without Bedrails 4   Moving Bed to Chair 4   Standing Up From Chair Using Arms 4   Walk in Room 3   Climb 3-5 Stairs With Railing 2   Basic Mobility Inpatient Raw Score 21   Basic Mobility Standardized Score 45.55   Highest Level Of Mobility   JH-HLM Goal 6: Walk 10 steps or more   JH-HLM Achieved 2: Bed activities/Dependent transfer  (seated TE performed and deferred all other mobility tasks)     The patient's AM-PAC Basic Mobility Inpatient Short Form Raw Score is 21. A raw score greater than 16 suggests the patient may benefit from discharge to home. Please also refer to the recommendation of the Physical Therapist for safe discharge planning. Pt seen for PT treatment session this date with interventions consisting of BLE TE, and education provided as needed for safety and direction to improve functional mobility, safety awareness, and activity tolerance. Pt agreeable to PT treatment session upon arrival, pt found sitting in community room.  At end of session, pt left sitting in community room with all needs in reach. In comparison to previous session, pt with no improvements as evidenced by no functional gains made this session due to patient deferred participation in all activity except TE . Continue to recommend Level III (Minimum Resource Intensity) at time of d/c in order to maximize pt's functional independence and safety w/ mobility. Pt continues to be functioning below baseline level. PT will continue to see pt while here in order to address the deficits listed above and provide interventions consistent w/ POC in effort to achieve STGs.     Cheyanne Lujan

## 2023-11-13 NOTE — PROGRESS NOTES
Progress Note - Veto Contreras 47 y.o. female MRN: [id number]    [facility] Encounter: [id number]        Subjective:   Patient seen and examined at bedside after reviewing the chart and discussing the case with the caring staff. Patient examined at bedside. Patient reports no acute symptoms today. Patient's UA from 11/12/2023 showed no evidence of UTI. Physical Exam   Vitals: Blood pressure 120/59, pulse 93, temperature (!) 97.2 °F (36.2 °C), temperature source Temporal, resp. rate 18, height 5' 6" (1.676 m), weight 118 kg (260 lb 6.4 oz), SpO2 94 %. ,Body mass index is 42.03 kg/m². Constitutional: Patient in no acute distress. HEENT: PERR, EOMI, MMM. Cardiovascular: Normal rate and regular rhythm. Pulmonary/Chest: Effort normal and breath sounds normal.  Abdomen: Soft, + BS, NT. Assessment/Plan:     1. Cardiac with hx of HTN, HLD. Continue Prazosin 2 mg nightly. Continue to monitor. 2.  GERD. Continue Protonix 20 mg daily. 3.  Constipation. Continue Senokot S 2 tablets daily at bedtime. Add Dulcolax 10 mg daily prn.   4.  Seizure disorder. Seizure precautions. Patient is on Depakote 1000 mg nightly. Reports last seizure was over 10 years ago. 5.  DJD/OA along with bilateral shoulder and arm pain. Tylenol as needed. Voltaren gel 4 times daily over the shoulders and arm. 6.  Dry skin. Apply ammonium lactate to bilateral feet twice daily. 7.  Vertigo. Continue meclizine 25 mg twice daily. 8.  Intertrigo. Apply nystatin powder twice daily. 9.  Vitamin D deficiency. Patient started on vitamin D bolus doses for 10 weeks followed by vitamin D3 1000 units daily. 10.  Vitamin B12 deficiency. Patient started on B12 supplements. 11.  Dysuria. No UTI as per AUA 11/12/2023.

## 2023-11-13 NOTE — PLAN OF CARE
Problem: Alteration in Thoughts and Perception  Goal: Verbalize thoughts and feelings  Description: Interventions:  - Promote a nonjudgmental and trusting relationship with the patient through active listening and therapeutic communication  - Assess patient's level of functioning, behavior and potential for risk  - Engage patient in 1 on 1 interactions  - Encourage patient to express fears, feelings, frustrations, and discuss symptoms    - [place] patient to reality, help patient recognize reality-based thinking   - Administer medications as ordered and assess for potential side effects  - Provide the patient education related to the signs and symptoms of the illness and desired effects of prescribed medications  11/13/2023 0231 by Kathaleen Burkitt, RN  Outcome: Progressing  11/13/2023 0223 by Kathaleen Burkitt, RN  Outcome: Progressing     Problem: Ineffective Coping  Goal: Understands least restrictive measures  Description: Interventions:  - Utilize least restrictive behavior  Outcome: Progressing  Goal: Free from restraint events  Description: - Utilize least restrictive measures   - Provide behavioral interventions   - Redirect inappropriate behaviors   Outcome: Progressing

## 2023-11-13 NOTE — PROGRESS NOTES
11/13/23    Team Meeting   Meeting Type Daily Rounds   Team Members Present   Team Members Present Physician;Nurse;;Occupational Therapist   Physician Team Member Dr. Vamsi Ayala MD; SHANI De Jesus   Nursing Team Member Rosie Kowalski RN   Care Management Team Member [place] Cur[place] [facility] [place] [facility] [place]   [place] Team Member José Torres   Patient/Family Present   Patient Present No   Patient's Family Present No   Will return to [address] [place],[address] and follow up with new [facility] for psych when stable. D/c Thursday. Meds to be sent tomorrow to pharmacy. Slept. Feeling better, wants to be off finger foods. Regular clothes. No writting utensils. Urine reviewed. D/c finger foods today. Medication adjustment.

## 2023-11-13 NOTE — NURSING NOTE
Pt was visible and social on the unit Pt was calm and cooperative. Pt was medication compliant. Pt attended group and participated appropriately. Pt was bright and stated she feels ready for discharge. Pt stated she feels safe. Pt endorses mild anxiety and depression. Pt denies SI/HI/AVH. Q 7 minute safety checks were maintained.

## 2023-11-14 PROCEDURE — 97116 GAIT TRAINING THERAPY: CPT

## 2023-11-14 PROCEDURE — 99232 SBSQ HOSP IP/OBS MODERATE 35: CPT | Performed by: PSYCHIATRY & NEUROLOGY

## 2023-11-14 RX ORDER — FLUOXETINE HYDROCHLORIDE 20 MG/1
60 CAPSULE ORAL DAILY
Qty: 90 CAPSULE | Refills: 0 | Status: SHIPPED | OUTPATIENT
Start: 2023-11-15

## 2023-11-14 RX ORDER — AMMONIUM LACTATE 12 G/100G
LOTION TOPICAL 2 TIMES DAILY
Qty: 500 G | Refills: 0 | Status: SHIPPED | OUTPATIENT
Start: 2023-11-14

## 2023-11-14 RX ORDER — NYSTATIN 100000 [USP'U]/G
POWDER TOPICAL 2 TIMES DAILY PRN
Status: DISCONTINUED | OUTPATIENT
Start: 2023-11-14 | End: 2023-11-16 | Stop reason: HOSPADM

## 2023-11-14 RX ORDER — MELATONIN
1000 DAILY
Qty: 30 TABLET | Refills: 0 | Status: SHIPPED | OUTPATIENT
Start: 2024-01-06

## 2023-11-14 RX ORDER — MECLIZINE HYDROCHLORIDE 25 MG/1
25 TABLET ORAL 2 TIMES DAILY
Qty: 30 TABLET | Refills: 0 | Status: SHIPPED | OUTPATIENT
Start: 2023-11-14

## 2023-11-14 RX ORDER — PRAZOSIN HYDROCHLORIDE 2 MG/1
2 CAPSULE ORAL
Qty: 30 CAPSULE | Refills: 0 | Status: SHIPPED | OUTPATIENT
Start: 2023-11-14 | End: 2023-12-14

## 2023-11-14 RX ORDER — PANTOPRAZOLE SODIUM 20 MG/1
20 TABLET, DELAYED RELEASE ORAL DAILY
Qty: 30 TABLET | Refills: 0 | Status: SHIPPED | OUTPATIENT
Start: 2023-11-14

## 2023-11-14 RX ORDER — ERGOCALCIFEROL 1.25 MG/1
50000 CAPSULE ORAL WEEKLY
Qty: 4 CAPSULE | Refills: 0 | Status: SHIPPED | OUTPATIENT
Start: 2023-11-18 | End: 2024-01-07

## 2023-11-14 RX ORDER — ACETAMINOPHEN 325 MG/1
650 TABLET ORAL EVERY 4 HOURS PRN
Qty: 90 TABLET | Refills: 0 | Status: SHIPPED | OUTPATIENT
Start: 2023-11-14

## 2023-11-14 RX ORDER — TRAZODONE HYDROCHLORIDE 100 MG/1
100 TABLET ORAL
Qty: 30 TABLET | Refills: 0 | Status: SHIPPED | OUTPATIENT
Start: 2023-11-14 | End: 2023-12-14

## 2023-11-14 RX ORDER — NYSTATIN 100000 [USP'U]/G
POWDER TOPICAL 2 TIMES DAILY PRN
Qty: 60 G | Refills: 0 | Status: SHIPPED | OUTPATIENT
Start: 2023-11-14

## 2023-11-14 RX ORDER — LANOLIN ALCOHOL/MO/W.PET/CERES
3 CREAM (GRAM) TOPICAL
Qty: 30 TABLET | Refills: 0 | Status: SHIPPED | OUTPATIENT
Start: 2023-11-14

## 2023-11-14 RX ORDER — RISPERIDONE 3 MG/1
3 TABLET ORAL
Qty: 30 TABLET | Refills: 0 | Status: SHIPPED | OUTPATIENT
Start: 2023-11-14

## 2023-11-14 RX ORDER — CYANOCOBALAMIN (VITAMIN B-12) 500 MCG
1000 TABLET ORAL DAILY
Qty: 30 TABLET | Refills: 0 | Status: SHIPPED | OUTPATIENT
Start: 2023-11-14

## 2023-11-14 RX ORDER — RISPERIDONE 2 MG/1
2 TABLET ORAL DAILY
Qty: 30 TABLET | Refills: 0 | Status: SHIPPED | OUTPATIENT
Start: 2023-11-15

## 2023-11-14 RX ORDER — DIVALPROEX SODIUM 500 MG/1
1000 TABLET, EXTENDED RELEASE ORAL
Qty: 60 TABLET | Refills: 0 | Status: SHIPPED | OUTPATIENT
Start: 2023-11-14

## 2023-11-14 RX ORDER — AMOXICILLIN 250 MG
2 CAPSULE ORAL
Qty: 60 TABLET | Refills: 0 | Status: SHIPPED | OUTPATIENT
Start: 2023-11-14 | End: 2023-12-14

## 2023-11-14 RX ADMIN — RISPERIDONE 2 MG: 2 TABLET ORAL at 08:33

## 2023-11-14 RX ADMIN — TRAZODONE HYDROCHLORIDE 100 MG: 100 TABLET ORAL at 21:06

## 2023-11-14 RX ADMIN — FLUOXETINE 60 MG: 20 CAPSULE ORAL at 08:33

## 2023-11-14 RX ADMIN — Medication 3 MG: at 21:06

## 2023-11-14 RX ADMIN — RISPERIDONE 3 MG: 2 TABLET ORAL at 21:06

## 2023-11-14 RX ADMIN — BISACODYL 10 MG: 5 TABLET, COATED ORAL at 19:48

## 2023-11-14 RX ADMIN — CYANOCOBALAMIN TAB 500 MCG 500 MCG: 500 TAB at 08:33

## 2023-11-14 RX ADMIN — DIVALPROEX SODIUM 1000 MG: 500 TABLET, FILM COATED, EXTENDED RELEASE ORAL at 21:06

## 2023-11-14 RX ADMIN — MECLIZINE 25 MG: 12.5 TABLET ORAL at 08:33

## 2023-11-14 RX ADMIN — PRAZOSIN HYDROCHLORIDE 2 MG: 1 CAPSULE ORAL at 21:06

## 2023-11-14 RX ADMIN — MECLIZINE 25 MG: 12.5 TABLET ORAL at 17:45

## 2023-11-14 RX ADMIN — Medication 1 APPLICATION: at 21:06

## 2023-11-14 RX ADMIN — Medication: at 08:34

## 2023-11-14 NOTE — NURSING NOTE
Visible on the unit and social with select peers. Cooperative with staff during care. Compliant with medication. Endorsed mild to moderate anxiety and depression r/t unit. Denied SI, HI, or AVH. Pt's mood was labile. Eye contact was good. Speech pattern was normal and relaxed. Appearance and hygiene was unremarkable. Pt is resting in her room. 7 minute safety checks continued.

## 2023-11-14 NOTE — SOCIAL WORK
CM placed call to [facility] with [facility] to notify of PT scheduled discharge. Left message informing of PT scheduled discharge requesting return call. [phone] ext [phone].

## 2023-11-14 NOTE — NURSING NOTE
Pt denies any anxiety & c/o moderate depression. Pt is pleasant & cooperative. Pt denies any hallucinations, suicidal or homicidal ideations. Q 7 min checks maintained to monitor pt's behavior & safety. Pt is compliant with medications. Pt up ad saima & gait is steady.

## 2023-11-14 NOTE — NURSING NOTE
Pt having passive SI to harm self without plan. Pt is now seated @ nurses station on recliner chair. Pt is rambling @ times. Q 7 min checks maintained to monitor pt's behavior & safety.

## 2023-11-14 NOTE — PROGRESS NOTES
Progress Note - Cuca Gallo 47 y.o. female MRN: [id number]    Unit/Bed#[facility] 208-01 Encounter: [id number]        Subjective:   Patient seen and examined at bedside after reviewing the chart and discussing the case with the caring staff. Patient examined at bedside. Patient reports no acute symptoms today. Patient is being discharged on Thursday, 11/16/2023. Patient is requesting all her prescriptions. I reviewed and reconciled patient's problem list and medications. Physical Exam   Vitals: Blood pressure 131/70, pulse 82, temperature (!) 97 °F (36.1 °C), temperature source Temporal, resp. rate 18, height 5' 6" (1.676 m), weight 118 kg (260 lb 6.4 oz), SpO2 94 %. ,Body mass index is 42.03 kg/m². Constitutional: Patient in no acute distress. HEENT: PERR, EOMI, MMM. Cardiovascular: Normal rate and regular rhythm. Pulmonary/Chest: Effort normal and breath sounds normal.  Abdomen: Soft, + BS, NT. Assessment/Plan:  Cuca Gallo is a(n) 47 y.o. female with MDD. Medical clearance. Patient is medically cleared for discharge. All scripts will be sent out for the patient. 1.  Cardiac with hx of HTN, HLD. Continue Prazosin 2 mg nightly. Continue to monitor. 2.  GERD. Continue Protonix 20 mg daily. 3.  Constipation. Continue Senokot S 2 tablets daily at bedtime. Add Dulcolax 10 mg daily prn.   4.  Seizure disorder. Seizure precautions. Patient is on Depakote 1000 mg nightly. Reports last seizure was over 10 years ago. 5.  DJD/OA along with bilateral shoulder and arm pain. Tylenol as needed. Voltaren gel 4 times daily over the shoulders and arm. 6.  Dry skin. Apply ammonium lactate to bilateral feet twice daily. 7.  Vertigo. Continue meclizine 25 mg twice daily. 8.  Intertrigo. Apply nystatin powder twice daily. 9.  Vitamin D deficiency. Patient started on vitamin D bolus doses for 10 weeks followed by vitamin D3 1000 units daily. 10.  Vitamin B12 deficiency.   Patient started on B12 supplements.

## 2023-11-14 NOTE — OCCUPATIONAL THERAPY NOTE
11/14/23 1004   OT Last Visit   OT Visit Date 11/14/23   Note Type   Note Type Cancelled Session   Cancel Reasons Refusal       Attempted OT treatment this morning at 1004. Patient in bed at approach and reports feeling depressed. Declined OT treatment today despite encouragement. Will continue with POC as able.   Montserrat Colin

## 2023-11-14 NOTE — PLAN OF CARE
Problem: PHYSICAL THERAPY ADULT  Goal: Performs mobility at highest level of function for planned discharge setting. See evaluation for individualized goals. Description: Treatment/Interventions: LE strengthening/ROM, Therapeutic exercise, Endurance training, Patient/family training, Equipment eval/education, Gait training, Spoke to nursing  Equipment Recommended:  (none at this time; TBD)       See flowsheet documentation for full assessment, interventions and recommendations. Outcome: Progressing  Note: Prognosis: Good  Problem List: Decreased strength, Impaired balance, Decreased mobility, Impaired judgement, Decreased safety awareness, Pain  Assessment: Pt seen for PT treatment session this date with interventions consisting of transfer training, gait training, and education provided as needed for safety and direction to improve functional mobility, safety awareness, and activity tolerance. Pt agreeable to PT treatment session upon arrival, pt found sitting in community room. At end of session, pt left sitting in community room with all needs in reach. In comparison to previous session, pt with improvements in ambulation distance . Continue to recommend Level III (Minimum Resource Intensity) at time of d/c in order to maximize pt's functional independence and safety w/ mobility. Pt continues to be functioning below baseline level. PT will continue to see pt while here in order to address the deficits listed above and provide interventions consistent w/ POC in effort to achieve STGs. Barriers to Discharge: None     Rehab Resource Intensity Level, PT: III (Minimum Resource Intensity)    See flowsheet documentation for full assessment.

## 2023-11-14 NOTE — PROGRESS NOTES
11/14/23    Team Meeting   Meeting Type Daily Rounds   Team Members Present   Team Members Present Physician;Nurse;;Occupational Therapist   Physician Team Member Dr. Georgia Ma MD; SHANI Graves   Nursing Team Member Polo Jean RN   Care Management Team Member MS Curly, [place], [place]; BRITT Rojas   OT Team Member Jose Zimmer [place]   Patient/Family Present   Patient Present No   Patient's Family Present No   Will return to [facility] and follow up with new [facility]. Visible, social, mild anxiety and depressed. No prns. Scripts to pharmacy today. D/C Thursday.

## 2023-11-14 NOTE — SOCIAL WORK
Ernesto Damon from [facility] indicated that Lilian Burton is out of office at this time and that she is covering. Ernesto Damon indicated that she is ok with PT return on Thursday. Updated on PT status and plan of care. Ernesto Damon confirmed receipt of medications with pharmacy. Ernesto Damon will follow up with CM on transportation arrangements. Confirmed Fax: [phone]. Call ended mutually.

## 2023-11-14 NOTE — NURSING NOTE
Patient has been visible in the milieu today. She has been pleasant and appropriate. She continues to endorse mild irritability in regards to loud noise volume on the unit. She has been compliant with her scheduled medications. Her appetite is good. She endorses feeling ready to discharge on Thursday. She denies Si/Hi and hallucinations. She has been up and ambulatory without difficulty. Plan of care continues. Q7 minute safety checks in progress.

## 2023-11-14 NOTE — PHYSICAL THERAPY NOTE
PHYSICAL THERAPY TREATMENT NOTE  NAME:  Lori Antonio  DATE: 11/14/23    Length Of Stay: 11  Performed at least 2 patient identifiers during session: Name and ID bracelet    TREATMENT FLOWSHEET:    11/14/23 1418   PT Last Visit   PT Visit Date 11/14/23   Note Type   Note Type Treatment   Pain Assessment   Pain Assessment Tool 0-10   Pain Score 5   Pain Location/Orientation Orientation: Bilateral;Location: Arm;Location: Shoulder   Pain Onset/Description Onset: Ongoing;Frequency: Constant/Continuous; Descriptor: Aching   Patient's Stated Pain Goal No pain   Hospital Pain Intervention(s) Ambulation/increased activity   Restrictions/Precautions   Weight Bearing Precautions Per Order No   Other Precautions Fall Risk;Pain   General   Chart Reviewed Yes   Response to Previous Treatment Patient with no complaints from previous session. Family/Caregiver Present No   Cognition   Overall Cognitive Status WFL   Arousal/Participation Alert; Responsive   Attention Attends with cues to redirect   Orientation Level Oriented X4   Memory Decreased recall of precautions;Decreased recall of recent events   Following Commands Follows one step commands without difficulty   Comments Pt. tearful during session because pt. was told she could not have a pen to use due to safety concerns   Subjective   Subjective "They won't let me have a pen so I'll just ask the next shift that comes on."   Bed Mobility   Additional Comments Pt. found sitting in the community room   Transfers   Sit to Stand 6  Modified independent   Additional items Armrests; Increased time required   Stand to Sit 6  Modified independent   Additional items Armrests; Increased time required   Additional Comments No AD used   Ambulation/Elevation   Gait pattern Improper Weight shift;Decreased foot clearance;Decreased heel strike;Decreased toe off;Step through pattern   Gait Assistance 5  Supervision   Additional items Assist x 1;Verbal cues   Assistive Device None   Distance 200ft   Stair Management Assistance Not tested   Balance   Static Sitting Normal   Dynamic Sitting Good   Static Standing Good   Dynamic Standing Fair +   Ambulatory Fair +   Endurance Deficit   Endurance Deficit No   Activity Tolerance   Activity Tolerance Patient tolerated treatment well   Nurse Made Aware yes   Assessment   Prognosis Good   Problem List Decreased strength; Impaired balance;Decreased mobility; Impaired judgement;Decreased safety awareness;Pain   Goals   Patient Goals to get a pen to use   PT Treatment Day 4   Plan   Treatment/Interventions Functional transfer training;LE strengthening/ROM; Elevations; Therapeutic exercise; Endurance training;Patient/family training;Bed mobility;Gait training; Compensatory technique education;Spoke to nursing   Progress Progressing toward goals   PT Frequency 2-3x/wk   Discharge Recommendation   Rehab Resource Intensity Level, PT III (Minimum Resource Intensity)   AM-PAC Basic Mobility Inpatient   Turning in Flat Bed Without Bedrails 4   Lying on Back to Sitting on Edge of Flat Bed Without Bedrails 4   Moving Bed to Chair 4   Standing Up From Chair Using Arms 4   Walk in Room 4   Climb 3-5 Stairs With Railing 3   Basic Mobility Inpatient Raw Score 23   Basic Mobility Standardized Score 50.88   Highest Level Of Mobility   JH-HLM Goal 7: Walk 25 feet or more   JH-HLM Achieved 7: Walk 25 feet or more         The patient's AM-PAC Basic Mobility Inpatient Short Form Raw Score is 23. A raw score greater than 16 suggests the patient may benefit from discharge to home. Please also refer to the recommendation of the Physical Therapist for safe discharge planning. Pt seen for PT treatment session this date with interventions consisting of transfer training, gait training, and education provided as needed for safety and direction to improve functional mobility, safety awareness, and activity tolerance.  Pt agreeable to PT treatment session upon arrival, pt found sitting in community room. At end of session, pt left sitting in community room with all needs in reach. In comparison to previous session, pt with improvements in ambulation distance . Continue to recommend Level III (Minimum Resource Intensity) at time of d/c in order to maximize pt's functional independence and safety w/ mobility. Pt continues to be functioning below baseline level. PT will continue to see pt while here in order to address the deficits listed above and provide interventions consistent w/ POC in effort to achieve STGs.     Cheyanne Khan

## 2023-11-14 NOTE — PROGRESS NOTES
Progress Note - Behavioral Health     Leydi Khan 47 y.o. female MRN: [id number]   Unit/Bed#: [facility] 208-01 Encounter: [id number]    Behavior over the last 24 hours: slowly improving. Leydi was seen for continuing care today, appears less anxious, mild somnolence but able to be engaged appropriately. She reports doing "ok" and did not experience AH last night. She was taken off from finger food yesterday and has not shown any self harming behavior for the past several days. She has been coloring and participating in groups appropriately. She has been sleeping and eating better and feels her energy level is good. Staff report fair participation in groups and on the unit. Sleep: improved  Appetite: normal  Medication side effects: denies  ROS: no complaints, all other systems are negative    Mental Status Evaluation:    Appearance:  dressed appropriately, overweight   Behavior:  cooperative, calm   Speech:  normal rate and volume   Mood:  anxious mild   Affect:  normal range and intensity   Thought Process:  organized   Associations: intact associations   Thought Content:  no overt delusions   Perceptual Disturbances: denies auditory hallucinations when asked   Risk Potential: Suicidal ideation - None at present  Homicidal ideation - None at present   Sensorium:  oriented to person, place, and time/date   Memory:  recent and remote memory grossly intact   Consciousness:  alert and awake   Attention: attention span and concentration are age appropriate   Insight:  limited   Judgment: fair   Gait/Station: normal gait/station   Motor Activity: no abnormal movements     Vital signs in last 24 hours:    Temp:  [97 °F (36.1 °C)-98.1 °F (36.7 °C)] 97 °F (36.1 °C)  HR:  [70-82] 82  Resp:  [18] 18  BP: (126-131)/(59-70) 131/70    Laboratory results: I have personally reviewed all pertinent laboratory/tests results.   Most Recent Labs:   Lab Results   Component Value Date    WBC 7.36 11/03/2023    RBC 3.82 11/03/2023    HGB 11.9 11/03/2023    HCT 36.5 11/03/2023     11/03/2023    RDW 14.5 11/03/2023    TOTANEUTABS 5.83 12/13/2022    NEUTROABS 4.33 11/03/2023    SODIUM 134 (L) 11/03/2023    K 4.2 11/03/2023    CL 97 11/03/2023    CO2 31 11/03/2023    BUN 13 11/03/2023    CREATININE 0.50 (L) 11/03/2023    GLUC 84 11/03/2023    GLUF 130 (H) 12/13/2022    CALCIUM 9.2 11/03/2023    AST 10 (L) 11/02/2023    ALT 10 11/02/2023    ALKPHOS 58 11/02/2023    TP 6.9 11/02/2023    ALB 4.1 11/02/2023    TBILI 0.34 11/02/2023    CHOLESTEROL 212 (H) 11/03/2023    HDL 42 (L) 11/03/2023    TRIG 223 (H) 11/03/2023    LDLCALC 125 (H) 11/03/2023    [address] 170 11/03/2023    VALPROICTOT 13 (L) 08/10/2023    PZA3YSORVLPQ 3.882 11/02/2023    PREGUR negative 11/06/2022    PREGSERUM Negative 04/28/2022    RPR Non-Reactive 12/13/2022    HGBA1C 4.5 08/08/2023    EAG 82 08/08/2023       Assessment/Plan   Principal Problem:    Major depression with psychotic features ([address])  Active Problems:    Primary hypertension    Hyperlipidemia    Class 3 severe obesity due to excess calories without serious comorbidity in adult [facility])    Chronic midline low back pain without sciatica    PTSD (post-traumatic stress disorder)    Vertigo    Seizures (HCC)    Insomnia    Anemia    Recommended Treatment:     Planned medication and treatment changes:     All current active medications have been reviewed  Encourage group therapy, milieu therapy and occupational therapy  Behavioral Health checks every 7 minutes  Possible discharge in 1 or 2 days if continues to improve    Current Facility-Administered Medications   Medication Dose Route Frequency Provider Last Rate    acetaminophen  650 mg Oral Q4H PRN Sarah L Dagnall, PA-C      acetaminophen  650 mg Oral Q6H PRN Sarah L Dagnall, PA-C      acetaminophen  975 mg Oral Q6H PRN Sarah Mckay PA-C      aluminum-magnesium hydroxide-simethicone  30 mL Oral Q4H PRN Ladora Olszewski, MD      ammonium lactate   Topical BID Sarah L Woody, PA-C      benztropine  1 mg Oral BID PRN Tho Garcia MD      bisacodyl  10 mg Oral Daily PRN Sarah L JULIA MckayC      [START ON 1/6/2024] cholecalciferol  1,000 Units Oral Daily Neha Magallanes MD      cyanocobalamin  500 mcg Oral Daily Neha Magallanes MD      Diclofenac Sodium  4 g Topical 4x Daily PRN Neha Magallanes MD      divalproex sodium  1,000 mg Oral HS Tho Garcia MD      ergocalciferol  50,000 Units Oral Weekly Neha Magallaens MD      FLUoxetine  60 mg Oral Daily Tho Garcia MD      haloperidol lactate  5 mg Intramuscular Q4H PRN Max 4/day Seth Gonsalves MD      hydrOXYzine HCL  50 mg Oral Q6H PRN Max 4/day Tho Garcia MD      LORazepam  1 mg Intramuscular Q6H PRN Max 3/day Seth Gonsalves MD      LORazepam  1 mg Oral Q8H PRN Tho Garcia MD      meclizine  25 mg Oral BID Sarah L Roel, PA-C      melatonin  3 mg Oral HS Seth Gonsalves MD      nicotine polacrilex  4 mg Oral Q2H PRN Seth Gonsalves MD      nystatin   Topical BID PRN Neha Magallanes MD      pantoprazole  20 mg Oral Early Morning Sarah L Dagnall, PA-C      prazosin  2 mg Oral HS Sarah L Roel, PA-C      risperiDONE  0.25 mg Oral Q4H PRN Max 6/day Seth Gonsalves MD      risperiDONE  0.5 mg Oral Q4H PRN Max 3/day SHANI Ochoa      risperiDONE  1 mg Oral Q2H PRN Max 3/day Seth Gonsalves MD      risperiDONE  2 mg Oral Daily Tho Garcia MD      risperiDONE  3 mg Oral HS Tho Garcia MD      senna-docusate sodium  2 tablet Oral HS Sarah L Dagnall, PA-C      traZODone  100 mg Oral HS Tho Garcia MD         Risks / Benefits of Treatment:    Risks, benefits, and possible side effects of medications explained to patient and patient verbalizes understanding and agreement for treatment. Counseling / Coordination of Care:    Patient's progress discussed with staff in treatment team meeting. Medications, treatment progress and treatment plan reviewed with patient.   Supportive therapy provided to patient. Group attendance encouraged.     Gloria Beltre MD 11/14/23

## 2023-11-15 ENCOUNTER — DOCUMENTATION (OUTPATIENT)
Dept: CASE MANAGEMENT | Facility: HOSPITAL | Age: 53
End: 2023-11-15

## 2023-11-15 PROBLEM — S92.353A CLOSED FRACTURE OF BASE OF FIFTH METATARSAL BONE: Status: RESOLVED | Noted: 2021-11-14 | Resolved: 2023-11-15

## 2023-11-15 PROBLEM — R42 VERTIGO: Status: RESOLVED | Noted: 2021-01-22 | Resolved: 2023-11-15

## 2023-11-15 PROBLEM — M79.605 LEFT LEG PAIN: Status: RESOLVED | Noted: 2021-12-18 | Resolved: 2023-11-15

## 2023-11-15 PROBLEM — F32.3 MAJOR DEPRESSION WITH PSYCHOTIC FEATURES (HCC): Status: RESOLVED | Noted: 2020-11-29 | Resolved: 2023-11-15

## 2023-11-15 PROBLEM — E87.1 HYPONATREMIA: Status: RESOLVED | Noted: 2022-04-27 | Resolved: 2023-11-15

## 2023-11-15 PROBLEM — G47.00 INSOMNIA: Status: RESOLVED | Noted: 2022-05-06 | Resolved: 2023-11-15

## 2023-11-15 PROBLEM — Z91.52 HISTORY OF NON-SUICIDAL SELF-HARM: Status: RESOLVED | Noted: 2022-07-29 | Resolved: 2023-11-15

## 2023-11-15 PROCEDURE — 99232 SBSQ HOSP IP/OBS MODERATE 35: CPT

## 2023-11-15 RX ADMIN — PRAZOSIN HYDROCHLORIDE 2 MG: 1 CAPSULE ORAL at 21:05

## 2023-11-15 RX ADMIN — HYDROXYZINE HYDROCHLORIDE 50 MG: 50 TABLET, FILM COATED ORAL at 19:43

## 2023-11-15 RX ADMIN — Medication 3 MG: at 21:05

## 2023-11-15 RX ADMIN — MECLIZINE 25 MG: 12.5 TABLET ORAL at 08:12

## 2023-11-15 RX ADMIN — CYANOCOBALAMIN TAB 500 MCG 500 MCG: 500 TAB at 08:12

## 2023-11-15 RX ADMIN — TRAZODONE HYDROCHLORIDE 100 MG: 100 TABLET ORAL at 21:05

## 2023-11-15 RX ADMIN — RISPERIDONE 3 MG: 2 TABLET ORAL at 21:05

## 2023-11-15 RX ADMIN — FLUOXETINE 60 MG: 20 CAPSULE ORAL at 08:12

## 2023-11-15 RX ADMIN — RISPERIDONE 2 MG: 2 TABLET ORAL at 08:12

## 2023-11-15 RX ADMIN — Medication: at 08:11

## 2023-11-15 RX ADMIN — Medication 1 APPLICATION: at 20:54

## 2023-11-15 RX ADMIN — MECLIZINE 25 MG: 12.5 TABLET ORAL at 17:10

## 2023-11-15 RX ADMIN — DIVALPROEX SODIUM 1000 MG: 500 TABLET, FILM COATED, EXTENDED RELEASE ORAL at 21:00

## 2023-11-15 NOTE — SOCIAL WORK
ROSE placed follow up call to [phone] ext. [phone] to follow up on transportation arrangements. Left message requesting return call. ROSE placed call to [facility] [phone] to notify of PT scheduled discharge and to schedule follow up. Left message requesting return call. ROSE placed call to [facility] [phone] to notify of PT scheduled discharge. Spoke with [facility] she will update team. PT is scheduled for 11/17/23 at 4pm. Call ended mutually.

## 2023-11-15 NOTE — PROGRESS NOTES
11/15/23 930   Team Meeting   Meeting Type Daily Rounds   Team Members Present   Team Members Present Physician;Nurse;   Physician Team Member Dr. Shannan Burgess MD [address] [place] [address] [address]   Nursing Team Member Laurie Schmid RN   Care Management Team Member Curly ANDRADE, [facility] P[place], MSW   Patient/Family Present   Patient Present No   Patient's Family Present No   201. D/C home tomorrow 11/15 to [facility]. Slept well. Passive SI. Will f/u with [facility].

## 2023-11-15 NOTE — PLAN OF CARE
Problem: DISCHARGE PLANNING - CARE MANAGEMENT  Goal: Discharge to post-acute care or home with appropriate resources  Description: INTERVENTIONS:  - Conduct assessment to determine patient/family and health care team treatment goals, and need for post-acute services based on payer coverage, community resources, and patient preferences, and barriers to discharge  - Address psychosocial, clinical, and financial barriers to discharge as identified in assessment in conjunction with the patient/family and health care team  - Arrange appropriate level of post-acute services according to patient’s   needs and preference and payer coverage in collaboration with the physician and health care team  - Communicate with and update the patient/family, physician, and health care team regarding progress on the discharge plan  - Arrange appropriate transportation to post-acute venues  Outcome: Completed   D/C to [facility] and follow up with [facility] for psych. Transport scheduled for 10am tomorrow.

## 2023-11-15 NOTE — NURSING NOTE
Patient has been sleeping nearby the nurses' station in a recliner chair, however she woke to utilize the bathroom. She informs she would like to sleep in her bed, in her room. Leydi informs to nursing staff that she feels safe to return to her room. No self-injurious behaviors observed. Continuous safety rounding in progress.

## 2023-11-15 NOTE — NURSING NOTE
Patient has been visible in the milieu this morning. She is bright and pleasant. She reports having a good morning and is looking forward to her discharge tomorrow. She has been compliant with her scheduled medications. Her appetite is good- 100% of meals. She reports mild anxiety and depression. Denies Si/Hi and hallucinations at this time. She is able to make her needs known and offers no current complaints/ concerns. Plan of care continues. Q7 minute safety checks in progress.

## 2023-11-15 NOTE — NURSING NOTE
Patient declines early AM medication, Protonix, stating "I haven't taken that and I'm not taking it today."  Medication returned to the cabinet.

## 2023-11-15 NOTE — NURSING NOTE
Patient visible in the community; spending most of her time in a recliner situated by the nurses station as patient informed earlier that she did not feel safe. She continues to deny feeling anxious, endorses moderate depression. Denies HI and hallucinations, however endorses ongoing thoughts of self-injurious behavior with plan to hit herself. Patient observed with no self-injurious behaviors. She also requested and received PRN Dulcolax at [address] [place] [address],[address] for c/o constipation. On reassessment, patient informs she had a large BM. Later at [facility] medication administration, she declined scheduled Senna and was compliant with other scheduled medications. Positive for snack and fluids tonight. Leydi has been pleasant and cooperative during staff interactions. Continuous safety rounding in progress.

## 2023-11-15 NOTE — BH TRANSITION RECORD
Contact Information: If you have any questions, concerns, pended studies, tests and/or procedures, or emergencies regarding your inpatient behavioral health visit. Please contact [facility] [phone] and ask to speak to a , nurse or physician. A contact is available 24 hours/ 7 days a week at this number. Summary of Procedures Performed During your Stay:  Below is a list of major procedures performed during your hospital stay and a summary of results:  - No major procedures performed. Pending Studies (From admission, onward)      None          Please follow up on the above pending studies with your PCP and/or referring provider.

## 2023-11-15 NOTE — PLAN OF CARE
Problem: SELF HARM/SUICIDALITY  Goal: Will have no self-injury during hospital stay  Description: INTERVENTIONS:  - Q 15 MINUTES: Routine safety checks  - Q WAKING SHIFT & PRN: Assess risk to determine if routine checks are adequate to maintain patient safety  - Encourage patient to participate actively in care by formulating a plan to combat response to suicidal ideation, identify supports and resources  Outcome: Progressing     Problem: Alteration in Thoughts and Perception  Goal: Verbalize thoughts and feelings  Description: Interventions:  - Promote a nonjudgmental and trusting relationship with the patient through active listening and therapeutic communication  - Assess patient's level of functioning, behavior and potential for risk  - Engage patient in 1 on 1 interactions  - Encourage patient to express fears, feelings, frustrations, and discuss symptoms    - Falls patient to reality, help patient recognize reality-based thinking   - Administer medications as ordered and assess for potential side effects  - Provide the patient education related to the signs and symptoms of the illness and desired effects of prescribed medications  Outcome: Progressing  Goal: Agree to be compliant with medication regime, as prescribed and report medication side effects  Description: Interventions:  - Offer appropriate PRN medication and supervise ingestion; conduct AIMS, as needed   Outcome: Progressing  Goal: Attend and participate in unit activities, including therapeutic, recreational, and educational groups  Description: Interventions:  -Encourage Visitation and family involvement in care  Outcome: Progressing  Goal: Recognize dysfunctional thoughts, communicate reality-based thoughts at the time of discharge  Description: Interventions:  - Provide medication and psycho-education to assist patient in compliance and developing insight into his/her illness   Outcome: Progressing  Goal: Complete daily ADLs, including personal hygiene independently, as able  Description: Interventions:  - Observe, teach, and assist patient with ADLS  - Monitor and promote a balance of rest/activity, with adequate nutrition and elimination   Outcome: Progressing     Problem: Ineffective Coping  Goal: Demonstrates healthy coping skills  Outcome: Progressing  Goal: Participates in unit activities  Description: Interventions:  - Provide therapeutic environment   - Provide required programming   - Redirect inappropriate behaviors   Outcome: Progressing  Goal: Patient/Family participate in treatment and DC plans  Description: Interventions:  - Provide therapeutic environment  Outcome: Progressing  Goal: Patient/Family verbalizes awareness of resources  Outcome: Progressing  Goal: Understands least restrictive measures  Description: Interventions:  - Utilize least restrictive behavior  Outcome: Progressing  Goal: Free from restraint events  Description: - Utilize least restrictive measures   - Provide behavioral interventions   - Redirect inappropriate behaviors   Outcome: Progressing     Problem: Anxiety  Goal: Anxiety is at manageable level  Description: Interventions:  - Assess and monitor patient's anxiety level. - Monitor for signs and symptoms (heart palpitations, chest pain, shortness of breath, headaches, nausea, feeling jumpy, restlessness, irritable, apprehensive). - Collaborate with interdisciplinary team and initiate plan and interventions as ordered.   - [place] patient to unit/surroundings  - Explain treatment plan  - Encourage participation in care  - Encourage verbalization of concerns/fears  - Identify coping mechanisms  - Assist in developing anxiety-reducing skills  - Administer/offer alternative therapies  - Limit or eliminate stimulants  Outcome: Progressing     Problem: Nutrition/Hydration-ADULT  Goal: Nutrient/Hydration intake appropriate for improving, restoring or maintaining nutritional needs  Description: Monitor and assess patient's nutrition/hydration status for malnutrition. Collaborate with interdisciplinary team and initiate plan and interventions as ordered. Monitor patient's weight and dietary intake as ordered or per policy. Utilize nutrition screening tool and intervene as necessary. Determine patient's food preferences and provide high-protein, high-caloric foods as appropriate.      INTERVENTIONS:  - Monitor oral intake, urinary output, labs, and treatment plans  - Assess nutrition and hydration status and recommend course of action  - Evaluate amount of meals eaten  - Assist patient with eating if necessary   - Allow adequate time for meals  - Recommend/ encourage appropriate diets, oral nutritional supplements, and vitamin/mineral supplements  - Order, calculate, and assess calorie counts as needed  - Recommend, monitor, and adjust tube feedings and TPN/PPN based on assessed needs  - Assess need for intravenous fluids  - Provide specific nutrition/hydration education as appropriate  - Include patient/family/caregiver in decisions related to nutrition  Outcome: Progressing

## 2023-11-15 NOTE — PROGRESS NOTES
Progress Note - Behavioral Health   Leydi Molina 47 y.o. female MRN: [id number]  Unit/Bed#: [facility] 208-01 Encounter: [id number]    Assessment/Plan   Principal Problem:    Major depression with psychotic features ([address])  Active Problems:    Primary hypertension    Hyperlipidemia    Class 3 severe obesity due to excess calories without serious comorbidity in [facility])    Chronic midline low back pain without sciatica    PTSD (post-traumatic stress disorder)    Vertigo    Seizures (HCC)    Insomnia    Anemia      Behavior over the last 24 hours:  improved  Sleep: normal  Appetite: normal  Medication side effects: No  ROS: no complaints and all other systems are negative    Subjective: Leydi was seen today for psychiatric follow-up. Patient calm, cooperative. She is visible on the unit, social with peers. Patient is positive for discharge tomorrow. Her mood continues to be controlled on the unit. According to nursing staff patient is compliant with her current medication regimen. She denied any sleep disturbance, SI/HI/AVH. She did not appear internally preoccupied. Mental Status Evaluation:  Appearance:  age appropriate, casually dressed, and overweight   Behavior:  Calm, cooperative   Speech:  normal pitch and normal volume   Mood:  improved   Affect:  mood-congruent   Thought Process:  goal directed   Associations: intact associations   Thought Content:  No overt delusions   Perceptual Disturbances: Denied AVH, did not appear internally preoccupied   Risk Potential: Suicidal Ideations none at present  Homicidal Ideations none at present  Potential for Aggression No   Sensorium:  person, place, and time/date   Memory:  recent and remote memory grossly intact   Consciousness:  alert and awake    Attention: attention span and concentration were age appropriate   Insight:  limited   Judgment: fair   Gait/Station: normal gait/station   Motor Activity: no abnormal movements     Progress Toward Goals:  Improved.  Patient continues to exhibit a controlled mood on the unit. She is compliant with her current psychotropic medication regimen. She denied side effects from current psychotropic medication regimen. Will continue current psychotropic medication regimen. No discharge date at this time. Recommended Treatment: Continue with group therapy, milieu therapy and occupational therapy. Risks, benefits and possible side effects of Medications:   Risks, benefits, and possible side effects of medications explained to patient and patient verbalizes understanding. Medications: all current active meds have been reviewed. Labs: I have personally reviewed all pertinent laboratory/tests results. Most Recent Labs:   Lab Results   Component Value Date    WBC 7.36 11/03/2023    RBC 3.82 11/03/2023    HGB 11.9 11/03/2023    HCT 36.5 11/03/2023     11/03/2023    RDW 14.5 11/03/2023    NEUTROABS 4.33 11/03/2023    SODIUM 134 (L) 11/03/2023    K 4.2 11/03/2023    CL 97 11/03/2023    CO2 31 11/03/2023    BUN 13 11/03/2023    CREATININE 0.50 (L) 11/03/2023    GLUC 84 11/03/2023    GLUF 130 (H) 12/13/2022    CALCIUM 9.2 11/03/2023    AST 10 (L) 11/02/2023    ALT 10 11/02/2023    ALKPHOS 58 11/02/2023    TP 6.9 11/02/2023    ALB 4.1 11/02/2023    TBILI 0.34 11/02/2023    CHOLESTEROL 212 (H) 11/03/2023    HDL 42 (L) 11/03/2023    TRIG 223 (H) 11/03/2023    LDLCALC 125 (H) 11/03/2023    NONHDLC 170 11/03/2023    VALPROICTOT 13 (L) 08/10/2023    IEM6ZGJNCTQM 3.882 11/02/2023    PREGSERUM Negative 04/28/2022    RPR Non-Reactive 12/13/2022    HGBA1C 4.5 08/08/2023    EAG 82 08/08/2023       Counseling / Coordination of Care  Total floor / unit time spent today 25 minutes.

## 2023-11-15 NOTE — DISCHARGE SUMMARY
Discharge Summary - [address] [facility] 47 y.o. female MRN: [id number]  Unit/Bed#: [facility] 910-91 Encounter: [id number]     Admission Date: 11/3/2023         Discharge Date: 11/16/2023    Attending Psychiatrist: Janet Mcmillan MD    Reason for Admission/HPI: Major depressive disorder, single episode, unspecified [F32.9]  Bipolar 1 disorder [facility]) [F31.9]    Patient is a 47 y.o. female presented with with a history of depression versus bipolar disorder, PTSD , anxiety who was admitted to the inpatient older adult psychiatric unit on a voluntary 201 commitment basis due to depression, anxiety, unstable mood, auditory hallucinations, and suicidal ideation. Patient was brought to ED by [facility], staff member due to increased depressive symptoms, auditory hallucinations with suicidal thoughts. She reported +AH telling her to walk in front of the car. UDS was negative, EKG with no acute changes. Hb 10.9  On evaluation in the inpatient psychiatric unit Leydi presents mildly anxious, limited historian but cooperative and able to engage in appropriate interview. Patient states that she has been increasingly depressed, feeling hopeless and worthless, having bad thoughts about her self-image  with worsening of SI. She admits to increased paranoia and hearing voices telling her to walk in the street and get hit by a car to end her life. Denies any active plan or intent to hurt herself and she is able to contract for safety presently. Denies any current homicidal, delusion or manic symptoms but admits feeling frustrated related to "not getting the money" at her living setting. Patient admits to multiple psych admissions and suicide attempt in the past by overdose, cutting self or scratching her arm. She does have superficial laceration on her left forearm. Patient denies any recent alcohol or illicit drug use. She agreed to be compliant with medication and treatment plan in the unit.     According to H&P of  [facility] Course: The patient was admitted to the inpatient psychiatric unit and started on every 7 minutes precautions. During the hospitalization the patient was attending individual therapy, group therapy, milieu therapy and occupational therapy. Psychiatric medications were titrated over the hospital stay. To address depression, mood instability, psychotic symptoms, nightmares, and insomnia the patient was started on antidepressant Prozac, mood stabilizer Depakote ER, antipsychotic medication Risperdal, hypnotic medication Trazodone, and medication to help with nightmares and flashbacks Prazosin. Medication doses were titrated during the hospital course. Prior to beginning of treatment medications risks and benefits and possible side effects including risk of liver impairment related to treatment with Depakote, risk of cardiovascular events in elderly related to treatment with antipsychotic medications, risk of suicidality and serotonin syndrome related to treatment with antidepressants, and risk of impaired next-day mental alertness, complex sleep-related behavior and dependence related to treatment with hypnotic medications were reviewed with the patient. The patient verbalized understanding and agreement for treatment. Patient's symptoms improved gradually over the hospital course. At the end of treatment the patient was doing well. Mood was stable at the time of discharge. The patient denied suicidal ideation, intent or plan at the time of discharge and denied homicidal ideation, intent or plan at the time of discharge. There was no overt psychosis at the time of discharge. Sleep and appetite were improved. The patient was tolerating medications and was not reporting any significant side effects at the time of discharge. Since the patient was doing well at the end of the hospitalization, treatment team felt that the patient could be safely discharged to outpatient care.      The outpatient follow up with  Mari Morales for Psych and Pcp  was arranged by the unit  upon discharge. Mental Status at time of Discharge:     Appearance:  age appropriate and casually dressed   Behavior:  Calm, cooperative   Speech:  normal pitch and normal volume   Mood:  improved   Affect:  mood-congruent   Thought Process:  goal directed   Thought Content:  No overt delusions   Perceptual Disturbances: Denied AVH, did not appear internally preoccupied   Risk Potential: none   Sensorium:  person, place, and time/date   Cognition:  recent and remote memory grossly intact   Consciousness:  alert and awake    Attention: attention span and concentration were age appropriate   Insight:  fair   Judgment: fair   Gait/Station: normal gait/station   Motor Activity: no abnormal movements     Admission Diagnosis:Major depressive disorder, single episode, unspecified [F32.9]  Bipolar 1 disorder ([address]) [F31.9]    Discharge Diagnosis:   Principal Problem (Resolved):     Major depression with psychotic features ([address])  Active Problems:    Primary hypertension    Hyperlipidemia    Class 3 severe obesity due to excess calories without serious comorbidity in adult [facility])    Chronic midline low back pain without sciatica    PTSD (post-traumatic stress disorder)    Seizures ([address])    Anemia  Resolved Problems:    Vertigo    Insomnia        Lab results:  Admission on 11/03/2023   Component Date Value    Sodium 11/03/2023 134 (L)     Potassium 11/03/2023 4.2     Chloride 11/03/2023 97     CO2 11/03/2023 31     ANION GAP 11/03/2023 6     BUN 11/03/2023 13     Creatinine 11/03/2023 0.50 (L)     Glucose 11/03/2023 84     Calcium 11/03/2023 9.2     eGFR 11/03/2023 111     WBC 11/03/2023 7.36     RBC 11/03/2023 3.82     Hemoglobin 11/03/2023 11.9     Hematocrit 11/03/2023 36.5     MCV 11/03/2023 96     MCH 11/03/2023 31.2     MCHC 11/03/2023 32.6     RDW 11/03/2023 14.5     MPV 11/03/2023 9.8     Platelets 75/86/6348 214     nRBC 11/03/2023 0 Neutrophils Relative 11/03/2023 58     Immat GRANS % 11/03/2023 2     Lymphocytes Relative 11/03/2023 25     Monocytes Relative 11/03/2023 12     Eosinophils Relative 11/03/2023 3     Basophils Relative 11/03/2023 0     Neutrophils Absolute 11/03/2023 4.33     Immature Grans Absolute 11/03/2023 0.11     Lymphocytes Absolute 11/03/2023 1.81     Monocytes Absolute 11/03/2023 0.87     Eosinophils Absolute 11/03/2023 0.21     Basophils Absolute 11/03/2023 0.03     Vitamin B-12 11/03/2023 379     Folate 11/03/2023 13.4     Vit D, 25-Hydroxy 11/03/2023 17.0 (L)     Cholesterol 11/03/2023 212 (H)     Triglycerides 11/03/2023 223 (H)     HDL, Direct 11/03/2023 42 (L)     LDL Calculated 11/03/2023 125 (H)     Non-HDL-Chol (CHOL-HDL) 11/03/2023 170     Color, UA 11/12/2023 Yellow     Clarity, UA 11/12/2023 Clear     Specific Gravity, UA 11/12/2023 1.020     pH, UA 11/12/2023 6.5     Leukocytes, UA 11/12/2023 Negative     Nitrite, UA 11/12/2023 Negative     Protein, UA 11/12/2023 Negative     Glucose, UA 11/12/2023 Negative     Ketones, UA 11/12/2023 Negative     Urobilinogen, UA 11/12/2023 1.0     Bilirubin, UA 11/12/2023 Negative     Occult Blood, UA 11/12/2023 Negative        Discharge Medications:  Current Discharge Medication List        START taking these medications    Details   acetaminophen (TYLENOL) 325 mg tablet Take 2 tablets (650 mg total) by mouth every 4 (four) hours as needed for moderate pain  Qty: 90 tablet, Refills: 0    Associated Diagnoses: Left leg pain      ammonium lactate (LAC-HYDRIN) 12 % lotion Apply topically 2 (two) times a day  Qty: 500 g, Refills: 0    Associated Diagnoses: Dry skin      Diclofenac Sodium (VOLTAREN) 1 % Apply 4 g topically 4 (four) times a day as needed (4 times daily PRN)  Qty: 350 g, Refills: 0    Associated Diagnoses: Left leg pain      ergocalciferol (VITAMIN D2) 50,000 units Take 1 capsule (50,000 Units total) by mouth once a week for 8 doses Do not start before November 18, 2023. Qty: 4 capsule, Refills: 0    Associated Diagnoses: Vitamin D deficiency      melatonin 3 mg Take 1 tablet (3 mg total) by mouth daily at bedtime  Qty: 30 tablet, Refills: 0    Associated Diagnoses: Insomnia      nicotine polacrilex (NICORETTE) 4 mg gum Chew 1 each (4 mg total) every 2 (two) hours as needed for smoking cessation  Qty: 100 each, Refills: 0    Associated Diagnoses: Tobacco abuse      nystatin (MYCOSTATIN) powder Apply topically 2 (two) times a day as needed (As needed)  Qty: 60 g, Refills: 0    Associated Diagnoses: Intertrigo      !! risperiDONE (RisperDAL) 2 mg tablet Take 1 tablet (2 mg total) by mouth daily Do not start before November 15, 2023. Qty: 30 tablet, Refills: 0    Associated Diagnoses: Major depression with psychotic features ([address]); PTSD (post-traumatic stress disorder)      !! risperiDONE (RisperDAL) 3 mg tablet Take 1 tablet (3 mg total) by mouth daily at bedtime  Qty: 30 tablet, Refills: 0    Associated Diagnoses: Major depression with psychotic features ([address]); PTSD (post-traumatic stress disorder)       ! ! - Potential duplicate medications found. Please discuss with provider.            Current Discharge Medication List        STOP taking these medications       cephalexin (KEFLEX) 500 mg capsule Comments:   Reason for Stopping:         risperiDONE (RisperDAL M-TAB) 2 mg disintegrating tablet Comments:   Reason for Stopping:         venlafaxine (EFFEXOR-XR) 37.5 mg 24 hr capsule Comments:   Reason for Stopping:         acetaminophen (TYLENOL) 80 mg chewable tablet Comments:   Reason for Stopping:         aspirin 81 mg chewable tablet Comments:   Reason for Stopping:         atorvastatin (LIPITOR) 40 mg tablet Comments:   Reason for Stopping:         benztropine (COGENTIN) 1 mg tablet Comments:   Reason for Stopping:         fluticasone (FLONASE) 50 mcg/act nasal spray Comments:   Reason for Stopping:         folic acid (FOLVITE) 1 mg tablet Comments:   Reason for Stopping:         haloperidol (HALDOL) 10 mg tablet Comments:   Reason for Stopping:         lisinopril (ZESTRIL) 20 mg tablet Comments:   Reason for Stopping:         LORazepam (ATIVAN) 1 mg tablet Comments:   Reason for Stopping:         mirtazapine (REMERON) 45 MG tablet Comments:   Reason for Stopping:         ondansetron (Zofran ODT) 4 mg disintegrating tablet Comments:   Reason for Stopping:         ziprasidone (GEODON) 40 mg capsule Comments:   Reason for Stopping:                Current Discharge Medication List        CONTINUE these medications which have CHANGED    Details   cholecalciferol (VITAMIN D3) 1,000 units tablet Take 1 tablet (1,000 Units total) by mouth daily Do not start before January 6, 2024. Qty: 30 tablet, Refills: 0    Associated Diagnoses: Vitamin D deficiency      Cyanocobalamin (Vitamin B 12) 500 MCG TABS Take 1,000 mcg by mouth in the morning  Qty: 30 tablet, Refills: 0    Associated Diagnoses: Vitamin D deficiency      divalproex sodium (DEPAKOTE ER) 500 mg 24 hr tablet Take 2 tablets (1,000 mg total) by mouth daily at bedtime  Qty: 60 tablet, Refills: 0    Associated Diagnoses: Seizures (HCC)      FLUoxetine (PROzac) 20 mg capsule Take 3 capsules (60 mg total) by mouth daily Do not start before November 15, 2023.   Qty: 90 capsule, Refills: 0    Associated Diagnoses: Major depression with psychotic features ([address]); PTSD (post-traumatic stress disorder)      meclizine (ANTIVERT) 25 mg tablet Take 1 tablet (25 mg total) by mouth 2 (two) times a day  Qty: 30 tablet, Refills: 0    Associated Diagnoses: Dizziness      pantoprazole (PROTONIX) 20 mg tablet Take 1 tablet (20 mg total) by mouth daily  Qty: 30 tablet, Refills: 0    Associated Diagnoses: Atypical chest pain      prazosin (MINIPRESS) 2 mg capsule Take 1 capsule (2 mg total) by mouth daily at bedtime  Qty: 30 capsule, Refills: 0    Associated Diagnoses: Major depression with psychotic features (HCC)      senna-docusate sodium (SENOKOT S) 8.6-50 mg per tablet Take 2 tablets by mouth daily at bedtime  Qty: 60 tablet, Refills: 0    Associated Diagnoses: Constipation      traZODone (DESYREL) 100 mg tablet Take 1 tablet (100 mg total) by mouth daily at bedtime  Qty: 30 tablet, Refills: 0    Associated Diagnoses: Major depression with psychotic features [facility])              Current Discharge Medication List           Discharge instructions/Information to patient and family:   See after visit summary for information provided to patient and family. Provisions for Follow-Up Care:  See after visit summary for information related to follow-up care and any pertinent home health orders. Discharge Statement     I spent 30 minutes discharging the patient. This time was spent on the day of discharge. I had direct contact with the patient on the day of discharge. Additional documentation is required if more than 30 minutes were spent on discharge:    I reviewed with Leydi importance of compliance with medications and outpatient treatment after discharge. I discussed the medication regimen and possible side effects of the medications with Leydi prior to discharge. At the time of discharge she was tolerating psychiatric medications. I discussed outpatient follow up with Leydi.

## 2023-11-15 NOTE — NURSING NOTE
Patient appears to have slept through the majority of the overnight hours without issue. She did return back to her room at approximately 0240. No complaints voiced. No acute behaviors observed. Patient remains in bed sleeping at this time. Continuous safety rounding in progress.

## 2023-11-15 NOTE — SOCIAL WORK
CM received voicemail from [facility] with [facility] whom indicated that they do not have a  to transport PT tomorrow and requested for hospital to assist with transport. CM contacted [facility] and received permission for transport to be provided by hospital for PT discharge. PCM gave permission. CM sent transport request via roundtrip for 10 am tomorrow, pending response. CM received confirmation from 22 Carol Cook claimed the ride for [employer] in unit/room [facility] 208 bed [facility] 208-01, and will arrive on 11/16/2023 at 10:00am EST. Contact them at [phone]. Ride details here: [email or web address]    CM placed call to [facility] with [facility] and left message informing  her that PT transport has been scheduled for PT return tomorrow. Requested return call. [phone] ext. [phone]     CM received voicemail from [place] with [facility] [phone]. Brooks Coleman indicated that she will see PT on site tomorrow for therapy, and that PT is schedule to see Dr. Kevin Brown MD on site on Tuesday for medication management.

## 2023-11-15 NOTE — PROGRESS NOTES
Progress Note - Dalila Dove 47 y.o. female MRN: [id number]    Unit/Bed#[facility] 208-01 Encounter: [id number]        Subjective:   Patient seen and examined at bedside after reviewing the chart and discussing the case with the caring staff. Patient examined at bedside. Patient reports no acute symptoms today. Patient is being discharged on Thursday, 11/16/2023. Patient is requesting all her prescriptions. I reviewed and reconciled patient's problem list and medications. Physical Exam   Vitals: Blood pressure 130/65, pulse 80, temperature 98.4 °F (36.9 °C), temperature source Temporal, resp. rate 16, height 5' 6" (1.676 m), weight 118 kg (260 lb 6.4 oz), SpO2 96 %. ,Body mass index is 42.03 kg/m². Constitutional: Patient in no acute distress. HEENT: PERR, EOMI, MMM. Cardiovascular: Normal rate and regular rhythm. Pulmonary/Chest: Effort normal and breath sounds normal.  Abdomen: Soft, + BS, NT. Assessment/Plan:  Dalila Dove is a(n) 47 y.o. female with MDD. Medical clearance. Patient is medically cleared for discharge. All scripts will be sent out for the patient. 1.  Cardiac with hx of HTN, HLD. Continue Prazosin 2 mg nightly. Continue to monitor. 2.  GERD. Continue Protonix 20 mg daily. 3.  Constipation. Continue Senokot S 2 tablets daily at bedtime. Add Dulcolax 10 mg daily prn.   4.  Seizure disorder. Seizure precautions. Patient is on Depakote 1000 mg nightly. Reports last seizure was over 10 years ago. 5.  DJD/OA along with bilateral shoulder and arm pain. Tylenol as needed. Voltaren gel 4 times daily over the shoulders and arm. 6.  Dry skin. Apply ammonium lactate to bilateral feet twice daily. 7.  Vertigo. Continue meclizine 25 mg twice daily. 8.  Intertrigo. Apply nystatin powder twice daily. 9.  Vitamin D deficiency. Patient started on vitamin D bolus doses for 10 weeks followed by vitamin D3 1000 units daily. 10.  Vitamin B12 deficiency.   Patient started on B12 supplements.

## 2023-11-15 NOTE — BEHAVIORAL HEALTH HIGH UTILIZER
Patient Name:Leydi New MRN:  [id number]         : 1970     Age: 47 y.o. Sex: female   Utilization History:  (# of ED visits & IP admits; reasons)  Pt had 18 [facility]-ED visits from 2022-2023. ED presentations were related to SI's with no plan or a plan to cut her wrists or throat, jump or walk in front of a car or traffic, hang herself or drink bleach. Presentations were also related to [facility], feelings that no one loves or cares about her, self-harm superficial scratches with a thumbtack and calling 911 on herself from her group home setting. Medically, pt has reported back pain, chest pain, pain from falling, generalized body pain, UTI symptoms. Pt had three 30-day behavioral health readmissions within the past year ([id number]). Treatment Recommendations & Presentation:  Presentation in the ED: Pt will typically call [phone] for EMS/police to bring her to ED's or is accompanied by her group home staff. During ED visits, pt may state that no one cares about her or loves her or that she just had a fight with her boyfriend and that he doesn't love her. SI's with no plan or SI's with a plan to cut her wrists or throat, jump or walk in front of a car or traffic, hang herself, drink bleach or kill herself with a thumbtack. Pt also reports Auditory Hallucinations. Pt has superficially scratched or poked herself with a thumbtack on multiple occasions and has also utilized a plastic fork to scratch herself. Pt also states she does not like living at her group home and does not want to be discharged from [address] stays. Medically, pt has reported back pain, chest pain, pain from falling, flank & body pain, UTI symptoms. Pt will call 911 on herself in order for EMS to bring her to ED's and tends to do this at certain times when group home staffing is more limited. If pt's boyfriend is being hospitalized then pt also wants hospitalization.  Pt may seek hospitalization because she spent her allowance and is frustrated. ED Recommendations:  Contact Lorrie Zhou - Supervisor, Care Coordinator [phone] ext. [phone] or [facility] [phone] or Supervisor BRITT Morejon - [phone] [phone], or Therapist Ector Welsh from [facility] at [phone] ext [phone]. They can develop and initiate an action plan with ED staff for pt's safe discharge from ED. Pt will often de-escalate when allotted some time in the ED. Home Medication Regimen:See most recent documentation in Epic, can verify medication with staff or nurse from [address] [place] [address] [phone]. Recent Medical Work Ups:  (include Psych testing or ECT)     EKG on 4/25/22, Comprehensive lab work in April and May of 2022, Valproic Acid level of 72.4 on 5/8/22. Labs in ED's - 2023 Inpatient Recommendations: A non-extended in [address] [facility] stay should be considered as brief stays have been requested by [facility] staff. Pt should attend groups, pt has a High Risk Therapeutic Plan from Programming. [facility] staff should be contacted as soon as possible to participate in pt's treatment and discharge planning. Outpatient recommendations: Pt is to continue with treatment at [address] [place] [address]. [facility] group home staff, care coordinators, and therapist should develop a crisis plan with pt and healthy coping skills in response to her distressful feelings. Situation/Relevant Background Info:    Pt is a 47year old female with Cognitive Impairment, Bipolar I Disorder, and Borderline Personality Disorder along with an extensive history of behavioral health inpatient admissions. Pt resides in a group home setting at [facility]. Pt also has Care Coordinators and receives therapy and psychiatric care through the [address] [place] [address].     Pt appears to derive secondary gains from ED visits/encounters as well inpatient behavioral health [facility]) admissions; therefore, group home staff has advised hospital professionals that pt should have brief behavioral health stays/hospitalizations. Pt states that she has a boyfriend who is a support system for her; staff confirms that pt has a boyfriend who also lives at [facility] and she will often mirror his issues - pt attempts to be hospitalized when her boyfriend is being hospitalized. Pt's romantic interests may change to other individuals at her group home. Upon [facility] admission, Pt often states that she does not want to be in her group home and does not want to be discharged back to her group home. Pt has a possibility of another living situation as per TIP coordinator but pt has to be stable in the community. Pt was moved from [facility] to [facility] in 2023 and is still getting used to her new staff and housing situation. Therapist Iwona Ocasio states pt's most recent [facility] admit 11/2023 was due to pt spending her allowance on take out food and then being very upset that money was gone. They intend to work on her budgeting issues. Diagnoses/Significant Problems (Medical & Psychiatric):    Psychiatric:  Bipolar 1 Disorder, MDD, PTSD, Borderline Personality Disorder     Medical: Hyponatremia ([address]), Seizures ([address]), Obesity, Hyperlipidemia, HTN, Cognitive Impairment         Drivers of repeated utilization:   Secondary gains from ED visits and inpatient [facility] stays, impulsivity, limited coping skills, attention seeking behaviors, wanting to be in hospital if her boyfriend is hospitalized. Existing [employer] Supports:       Little to no family involvement. Pt will state she has a boyfriend, confirm with Sona Jaimes                Patient Medical & Psychiatric Care Team:  [id number] [facility] ([address] [phone]  Therapist Iwona Ocasio - [phone] ext [facility] care coordinator [phone] [phone] [address] coordinator [phone] ext [phone]  [facility] - [phone] ext.  [phone], supervisor       Care plan date: 11/15/23                   Author:  Abhi Vargas                 Date reviewed with patient:

## 2023-11-15 NOTE — OCCUPATIONAL THERAPY NOTE
11/15/23 1144   Note Type   Note Type Cancelled Session   Cancel Reasons Refusal       Attempted OT treatment today at 1144. Patient refused despite encouragement to participate. Will continue with POC as able.   Ethel Marroquin

## 2023-11-15 NOTE — PLAN OF CARE
Problem: Alteration in Thoughts and Perception  Goal: Attend and participate in unit activities, including therapeutic, recreational, and educational groups  Description: Interventions:  -Encourage Visitation and family involvement in care  Outcome: Progressing     Problem: Ineffective Coping  Goal: Participates in unit activities  Description: Interventions:  - Provide therapeutic environment   - Provide required programming   - Redirect inappropriate behaviors   Outcome: Progressing

## 2023-11-16 VITALS
HEART RATE: 64 BPM | OXYGEN SATURATION: 94 % | RESPIRATION RATE: 16 BRPM | BODY MASS INDEX: 41.85 KG/M2 | TEMPERATURE: 98.7 F | SYSTOLIC BLOOD PRESSURE: 133 MMHG | DIASTOLIC BLOOD PRESSURE: 63 MMHG | HEIGHT: 66 IN | WEIGHT: 260.4 LBS

## 2023-11-16 PROCEDURE — 99238 HOSP IP/OBS DSCHRG MGMT 30/<: CPT

## 2023-11-16 RX ADMIN — MECLIZINE 25 MG: 12.5 TABLET ORAL at 08:59

## 2023-11-16 RX ADMIN — RISPERIDONE 2 MG: 2 TABLET ORAL at 08:59

## 2023-11-16 RX ADMIN — CYANOCOBALAMIN TAB 500 MCG 500 MCG: 500 TAB at 08:59

## 2023-11-16 RX ADMIN — FLUOXETINE 60 MG: 20 CAPSULE ORAL at 08:59

## 2023-11-16 NOTE — NURSING NOTE
Patient c/o increased anxiety d/t overstimulation of activities in the dining area. States, "I'm not having a good night." She requests PRN medication. Performed a Smith Scale rating with a result of 13. Administered PRN Atarax 50 mg as per order for mild anxiety. Also states, "I don't feel safe right now."  Patient encouraged to remain visible in the community. Continuous safety rounding in progress.

## 2023-11-16 NOTE — PROGRESS NOTES
11/16/23 1001   Discharge Charting   Discharge Via Wheelchair   Accompanied by: Transport Team   AVS Reviewed With & Written Instructions Given To: Receiving Facility   Prescription Given To: No Prescription Written     Reviewed AVS/discharge packet with patient. No further questions. Belongings and AVS/discharge packet given to patient upon discharge.

## 2023-11-16 NOTE — PROGRESS NOTES
Progress Note - Teri Tyler 47 y.o. female MRN: [id number]    Unit/Bed#[facility] 208-01 Encounter: [id number]        Subjective:   Patient seen and examined at bedside after reviewing the chart and discussing the case with the caring staff. Patient examined at bedside. Patient reports no acute symptoms. Patient is being discharged today, Thursday 11/16/2023. Patient is requesting all her prescriptions. I reviewed and reconciled patient's problem list and medications. Physical Exam   Vitals: Blood pressure 133/63, pulse 64, temperature 98.7 °F (37.1 °C), temperature source Temporal, resp. rate 16, height 5' 6" (1.676 m), weight 118 kg (260 lb 6.4 oz), SpO2 94 %. ,Body mass index is 42.03 kg/m². Constitutional: Patient in no acute distress. HEENT: PERR, EOMI, MMM. Cardiovascular: Normal rate and regular rhythm. Pulmonary/Chest: Effort normal and breath sounds normal.  Abdomen: Soft, + BS, NT. Assessment/Plan:  Teri Tyler is a(n) 47 y.o. female with MDD. Medical clearance. Patient is medically cleared for discharge. All scripts will be sent out for the patient. 1.  Cardiac with hx of HTN, HLD. Continue Prazosin 2 mg nightly. Continue to monitor. 2.  GERD. Continue Protonix 20 mg daily. 3.  Constipation. Continue Senokot S 2 tablets daily at bedtime. Add Dulcolax 10 mg daily prn.   4.  Seizure disorder. Seizure precautions. Patient is on Depakote 1000 mg nightly. Reports last seizure was over 10 years ago. 5.  DJD/OA along with bilateral shoulder and arm pain. Tylenol as needed. Voltaren gel 4 times daily over the shoulders and arm. 6.  Dry skin. Apply ammonium lactate to bilateral feet twice daily. 7.  Vertigo. Continue meclizine 25 mg twice daily. 8.  Intertrigo. Apply nystatin powder twice daily. 9.  Vitamin D deficiency. Patient started on vitamin D bolus doses for 10 weeks followed by vitamin D3 1000 units daily. 10.  Vitamin B12 deficiency.   Patient started on B12 supplements. The patient was discussed with Dr. Mikey Martin and he is in agreement with the above note.

## 2023-11-16 NOTE — SOCIAL WORK
CM met with pt to review d/c plan and f/u supports. IMM explained to pt who expressed verbal confirmation of understanding. Pt declined the right to appeal d/c at this time. Pt signed IMM and verbalized readiness to d/c home today.

## 2023-11-16 NOTE — PLAN OF CARE
Problem: SELF HARM/SUICIDALITY  Goal: Will have no self-injury during hospital stay  Description: INTERVENTIONS:  - Q 15 MINUTES: Routine safety checks  - Q WAKING SHIFT & PRN: Assess risk to determine if routine checks are adequate to maintain patient safety  - Encourage patient to participate actively in care by formulating a plan to combat response to suicidal ideation, identify supports and resources  Outcome: Progressing     Problem: DEPRESSION  Goal: Will be euthymic at discharge  Description: INTERVENTIONS:  - Administer medication as ordered  - Provide emotional support via 1:1 interaction with staff  - Encourage involvement in milieu/groups/activities  - Monitor for social isolation  Outcome: Progressing     Problem: Alteration in Thoughts and Perception  Goal: Treatment Goal: Gain control of psychotic behaviors/thinking, reduce/eliminate presenting symptoms and demonstrate improved reality functioning upon discharge  Outcome: Progressing  Goal: Verbalize thoughts and feelings  Description: Interventions:  - Promote a nonjudgmental and trusting relationship with the patient through active listening and therapeutic communication  - Assess patient's level of functioning, behavior and potential for risk  - Engage patient in 1 on 1 interactions  - Encourage patient to express fears, feelings, frustrations, and discuss symptoms    - Murphy patient to reality, help patient recognize reality-based thinking   - Administer medications as ordered and assess for potential side effects  - Provide the patient education related to the signs and symptoms of the illness and desired effects of prescribed medications  Outcome: Progressing  Goal: Refrain from acting on delusional thinking/internal stimuli  Description: Interventions:  - Monitor patient closely, per order   - Utilize least restrictive measures   - Set reasonable limits, give positive feedback for acceptable   - Administer medications as ordered and monitor of potential side effects  Outcome: Progressing  Goal: Agree to be compliant with medication regime, as prescribed and report medication side effects  Description: Interventions:  - Offer appropriate PRN medication and supervise ingestion; conduct AIMS, as needed   Outcome: Progressing  Goal: Attend and participate in unit activities, including therapeutic, recreational, and educational groups  Description: Interventions:  -Encourage Visitation and family involvement in care  Outcome: Progressing  Goal: Recognize dysfunctional thoughts, communicate reality-based thoughts at the time of discharge  Description: Interventions:  - Provide medication and psycho-education to assist patient in compliance and developing insight into his/her illness   Outcome: Progressing  Goal: Complete daily ADLs, including personal hygiene independently, as able  Description: Interventions:  - Observe, teach, and assist patient with ADLS  - Monitor and promote a balance of rest/activity, with adequate nutrition and elimination   Outcome: Progressing     Problem: Ineffective Coping  Goal: Identifies ineffective coping skills  Outcome: Progressing  Goal: Identifies healthy coping skills  Outcome: Progressing  Goal: Demonstrates healthy coping skills  Outcome: Progressing  Goal: Participates in unit activities  Description: Interventions:  - Provide therapeutic environment   - Provide required programming   - Redirect inappropriate behaviors   Outcome: Progressing  Goal: Patient/Family participate in treatment and DC plans  Description: Interventions:  - Provide therapeutic environment  Outcome: Progressing  Goal: Patient/Family verbalizes awareness of resources  Outcome: Progressing  Goal: Understands least restrictive measures  Description: Interventions:  - Utilize least restrictive behavior  Outcome: Progressing  Goal: Free from restraint events  Description: - Utilize least restrictive measures   - Provide behavioral interventions   - Redirect inappropriate behaviors   Outcome: Progressing     Problem: Anxiety  Goal: Anxiety is at manageable level  Description: Interventions:  - Assess and monitor patient's anxiety level. - Monitor for signs and symptoms (heart palpitations, chest pain, shortness of breath, headaches, nausea, feeling jumpy, restlessness, irritable, apprehensive). - Collaborate with interdisciplinary team and initiate plan and interventions as ordered. - [place] patient to unit/surroundings  - Explain treatment plan  - Encourage participation in care  - Encourage verbalization of concerns/fears  - Identify coping mechanisms  - Assist in developing anxiety-reducing skills  - Administer/offer alternative therapies  - Limit or eliminate stimulants  Outcome: Progressing     Problem: Nutrition/Hydration-ADULT  Goal: Nutrient/Hydration intake appropriate for improving, restoring or maintaining nutritional needs  Description: Monitor and assess patient's nutrition/hydration status for malnutrition. Collaborate with interdisciplinary team and initiate plan and interventions as ordered. Monitor patient's weight and dietary intake as ordered or per policy. Utilize nutrition screening tool and intervene as necessary. Determine patient's food preferences and provide high-protein, high-caloric foods as appropriate.      INTERVENTIONS:  - Monitor oral intake, urinary output, labs, and treatment plans  - Assess nutrition and hydration status and recommend course of action  - Evaluate amount of meals eaten  - Assist patient with eating if necessary   - Allow adequate time for meals  - Recommend/ encourage appropriate diets, oral nutritional supplements, and vitamin/mineral supplements  - Order, calculate, and assess calorie counts as needed  - Recommend, monitor, and adjust tube feedings and TPN/PPN based on assessed needs  - Assess need for intravenous fluids  - Provide specific nutrition/hydration education as appropriate  - Include patient/family/caregiver in decisions related to nutrition  Outcome: Progressing

## 2023-11-16 NOTE — NURSING NOTE
Patient was visible in the community this evening; spending time in a recliner chair nearby the nurses station. Positive for snack and fluids tonight. Bright and social with both staff and peers. Denies any pain. Endorses mild anxiety and depression. Denies HI. Endorses passive SI with no plan, states she is having auditory hallucinations of voices telling her she is fat and ugly. No self-injurious behaviors observed. Continuous safety rounding in progress.

## 2023-11-16 NOTE — PROGRESS NOTES
PT DISCHARGED WITH THE FOLLOWING:    Black socks  White underwear  Bra  Black leggings  Red, black and white long sleeve shirt  Green jacket  White sneakers with laces  90 cents       Patient agreed and signed

## 2023-11-16 NOTE — NURSING NOTE
Patient appears to have slept through the overnight hours without issue. No complaints voiced. No acute behaviors observed. Patient remains sleeping at this time. Continuous safety rounding in progress.

## 2023-11-16 NOTE — NURSING NOTE
On reassessment of PRN Atarax, patient informs the medication did not help her. She presents with a brighter affect; social with both staff and peers. She is currently sitting in a recliner nearby the nurses station.

## 2023-11-16 NOTE — NURSING NOTE
Patient is compliant with medications. Patient states she is ready to leave. No acute behaviors noted. Q 7 min safety checks maintained. Fall precautions maintained. Monitoring continues.

## 2023-11-16 NOTE — PROGRESS NOTES
11/16/23 930   Team Meeting   Meeting Type Daily Rounds   Team Members Present   Team Members Present Physician;Nurse;;Occupational Therapist   Physician Team Member MD Racquel Salmon, [address] [facility]   Nursing Team Member Og Pulido, JOSÉ MIGUEL   Care Management Team Member Clarebrady, [id number] [address] [place] , MSW   OT Team Member Cheikh Martinez [place]   Patient/Family Present   Patient Present No   Patient's Family Present No   Pt ready for d/c today.  Return home to the [facility] and f/u with DONNA Rojas, steve SI depression and anxiety

## 2023-11-25 ENCOUNTER — HOSPITAL ENCOUNTER (EMERGENCY)
Facility: HOSPITAL | Age: 53
End: 2023-11-26
Attending: EMERGENCY MEDICINE
Payer: MEDICARE

## 2023-11-25 DIAGNOSIS — R45.851 DEPRESSION WITH SUICIDAL IDEATION: Primary | ICD-10-CM

## 2023-11-25 DIAGNOSIS — N39.0 UTI (URINARY TRACT INFECTION): ICD-10-CM

## 2023-11-25 DIAGNOSIS — F32.A DEPRESSION WITH SUICIDAL IDEATION: Primary | ICD-10-CM

## 2023-11-25 LAB
ALBUMIN SERPL BCP-MCNC: 4 G/DL (ref 3.5–5)
ALP SERPL-CCNC: 54 U/L (ref 34–104)
ALT SERPL W P-5'-P-CCNC: 6 U/L (ref 7–52)
AMPHETAMINES SERPL QL SCN: NEGATIVE
ANION GAP SERPL CALCULATED.3IONS-SCNC: 9 MMOL/L
ANISOCYTOSIS BLD QL SMEAR: PRESENT
AST SERPL W P-5'-P-CCNC: 10 U/L (ref 13–39)
BACTERIA UR QL AUTO: ABNORMAL /HPF
BARBITURATES UR QL: NEGATIVE
BASOPHILS # BLD MANUAL: 0 THOUSAND/UL (ref 0–0.1)
BASOPHILS NFR MAR MANUAL: 0 % (ref 0–1)
BENZODIAZ UR QL: NEGATIVE
BILIRUB SERPL-MCNC: 0.38 MG/DL (ref 0.2–1)
BILIRUB UR QL STRIP: NEGATIVE
BUN SERPL-MCNC: 14 MG/DL (ref 5–25)
CALCIUM SERPL-MCNC: 8.9 MG/DL (ref 8.4–10.2)
CHLORIDE SERPL-SCNC: 103 MMOL/L (ref 96–108)
CLARITY UR: CLEAR
CO2 SERPL-SCNC: 24 MMOL/L (ref 21–32)
COCAINE UR QL: NEGATIVE
COLOR UR: YELLOW
CREAT SERPL-MCNC: 0.53 MG/DL (ref 0.6–1.3)
EOSINOPHIL # BLD MANUAL: 0.19 THOUSAND/UL (ref 0–0.4)
EOSINOPHIL NFR BLD MANUAL: 3 % (ref 0–6)
ERYTHROCYTE [DISTWIDTH] IN BLOOD BY AUTOMATED COUNT: 14.4 % (ref 11.6–15.1)
ETHANOL SERPL-MCNC: <10 MG/DL
GFR SERPL CREATININE-BSD FRML MDRD: 109 ML/MIN/1.73SQ M
GLUCOSE SERPL-MCNC: 117 MG/DL (ref 65–140)
GLUCOSE UR STRIP-MCNC: NEGATIVE MG/DL
HCT VFR BLD AUTO: 36.7 % (ref 34.8–46.1)
HGB BLD-MCNC: 11.9 G/DL (ref 11.5–15.4)
HGB UR QL STRIP.AUTO: NEGATIVE
KETONES UR STRIP-MCNC: NEGATIVE MG/DL
LEUKOCYTE ESTERASE UR QL STRIP: ABNORMAL
LYMPHOCYTES # BLD AUTO: 1.63 THOUSAND/UL (ref 0.6–4.47)
LYMPHOCYTES # BLD AUTO: 26 % (ref 14–44)
MCH RBC QN AUTO: 30.4 PG (ref 26.8–34.3)
MCHC RBC AUTO-ENTMCNC: 32.4 G/DL (ref 31.4–37.4)
MCV RBC AUTO: 94 FL (ref 82–98)
MONOCYTES # BLD AUTO: 0.5 THOUSAND/UL (ref 0–1.22)
MONOCYTES NFR BLD: 8 % (ref 4–12)
NEUTROPHILS # BLD MANUAL: 3.94 THOUSAND/UL (ref 1.85–7.62)
NEUTS SEG NFR BLD AUTO: 63 % (ref 43–75)
NITRITE UR QL STRIP: NEGATIVE
NON-SQ EPI CELLS URNS QL MICRO: ABNORMAL /HPF
OPIATES UR QL SCN: NEGATIVE
OXYCODONE+OXYMORPHONE UR QL SCN: NEGATIVE
PCP UR QL: NEGATIVE
PH UR STRIP.AUTO: 6 [PH]
PLATELET # BLD AUTO: 151 THOUSANDS/UL (ref 149–390)
PLATELET BLD QL SMEAR: ABNORMAL
PMV BLD AUTO: 9.7 FL (ref 8.9–12.7)
POTASSIUM SERPL-SCNC: 4.2 MMOL/L (ref 3.5–5.3)
PROT SERPL-MCNC: 6.9 G/DL (ref 6.4–8.4)
PROT UR STRIP-MCNC: NEGATIVE MG/DL
RBC # BLD AUTO: 3.92 MILLION/UL (ref 3.81–5.12)
RBC #/AREA URNS AUTO: ABNORMAL /HPF
RBC MORPH BLD: PRESENT
SODIUM SERPL-SCNC: 136 MMOL/L (ref 135–147)
SP GR UR STRIP.AUTO: 1.02 (ref 1–1.03)
THC UR QL: NEGATIVE
TSH SERPL DL<=0.05 MIU/L-ACNC: 2.2 UIU/ML (ref 0.45–4.5)
UROBILINOGEN UR STRIP-ACNC: 2 MG/DL
VALPROATE SERPL-MCNC: 48 UG/ML (ref 50–100)
WBC # BLD AUTO: 6.25 THOUSAND/UL (ref 4.31–10.16)
WBC #/AREA URNS AUTO: ABNORMAL /HPF

## 2023-11-25 PROCEDURE — 80307 DRUG TEST PRSMV CHEM ANLYZR: CPT | Performed by: PHYSICIAN ASSISTANT

## 2023-11-25 PROCEDURE — 84443 ASSAY THYROID STIM HORMONE: CPT | Performed by: PHYSICIAN ASSISTANT

## 2023-11-25 PROCEDURE — 85027 COMPLETE CBC AUTOMATED: CPT | Performed by: PHYSICIAN ASSISTANT

## 2023-11-25 PROCEDURE — 99285 EMERGENCY DEPT VISIT HI MDM: CPT

## 2023-11-25 PROCEDURE — 80053 COMPREHEN METABOLIC PANEL: CPT | Performed by: PHYSICIAN ASSISTANT

## 2023-11-25 PROCEDURE — 80164 ASSAY DIPROPYLACETIC ACD TOT: CPT | Performed by: PHYSICIAN ASSISTANT

## 2023-11-25 PROCEDURE — 36415 COLL VENOUS BLD VENIPUNCTURE: CPT | Performed by: PHYSICIAN ASSISTANT

## 2023-11-25 PROCEDURE — 87086 URINE CULTURE/COLONY COUNT: CPT | Performed by: PHYSICIAN ASSISTANT

## 2023-11-25 PROCEDURE — 85007 BL SMEAR W/DIFF WBC COUNT: CPT | Performed by: PHYSICIAN ASSISTANT

## 2023-11-25 PROCEDURE — 81001 URINALYSIS AUTO W/SCOPE: CPT | Performed by: PHYSICIAN ASSISTANT

## 2023-11-25 PROCEDURE — 93005 ELECTROCARDIOGRAM TRACING: CPT

## 2023-11-25 PROCEDURE — 82077 ASSAY SPEC XCP UR&BREATH IA: CPT | Performed by: PHYSICIAN ASSISTANT

## 2023-11-25 PROCEDURE — 99285 EMERGENCY DEPT VISIT HI MDM: CPT | Performed by: PHYSICIAN ASSISTANT

## 2023-11-25 RX ORDER — ACETAMINOPHEN 325 MG/1
650 TABLET ORAL EVERY 6 HOURS PRN
Status: DISCONTINUED | OUTPATIENT
Start: 2023-11-25 | End: 2023-11-26 | Stop reason: HOSPADM

## 2023-11-25 RX ORDER — MECLIZINE HCL 12.5 MG/1
25 TABLET ORAL EVERY 12 HOURS PRN
Status: DISCONTINUED | OUTPATIENT
Start: 2023-11-25 | End: 2023-11-26 | Stop reason: HOSPADM

## 2023-11-25 RX ORDER — FLUOXETINE HYDROCHLORIDE 20 MG/1
60 CAPSULE ORAL DAILY
Status: DISCONTINUED | OUTPATIENT
Start: 2023-11-26 | End: 2023-11-26 | Stop reason: HOSPADM

## 2023-11-25 RX ORDER — PANTOPRAZOLE SODIUM 20 MG/1
20 TABLET, DELAYED RELEASE ORAL DAILY
Status: DISCONTINUED | OUTPATIENT
Start: 2023-11-26 | End: 2023-11-26 | Stop reason: HOSPADM

## 2023-11-25 RX ORDER — CEPHALEXIN 250 MG/1
500 CAPSULE ORAL EVERY 12 HOURS SCHEDULED
Status: DISCONTINUED | OUTPATIENT
Start: 2023-11-25 | End: 2023-11-26 | Stop reason: HOSPADM

## 2023-11-25 RX ORDER — DIVALPROEX SODIUM 500 MG/1
1000 TABLET, EXTENDED RELEASE ORAL
Status: DISCONTINUED | OUTPATIENT
Start: 2023-11-25 | End: 2023-11-26 | Stop reason: HOSPADM

## 2023-11-25 RX ORDER — TRAZODONE HYDROCHLORIDE 50 MG/1
100 TABLET ORAL
Status: DISCONTINUED | OUTPATIENT
Start: 2023-11-25 | End: 2023-11-26 | Stop reason: HOSPADM

## 2023-11-25 RX ORDER — PRAZOSIN HYDROCHLORIDE 1 MG/1
2 CAPSULE ORAL
Status: DISCONTINUED | OUTPATIENT
Start: 2023-11-25 | End: 2023-11-26 | Stop reason: HOSPADM

## 2023-11-25 RX ORDER — LANOLIN ALCOHOL/MO/W.PET/CERES
3 CREAM (GRAM) TOPICAL
Status: DISCONTINUED | OUTPATIENT
Start: 2023-11-25 | End: 2023-11-26 | Stop reason: HOSPADM

## 2023-11-25 RX ORDER — RISPERIDONE 2 MG/1
2 TABLET ORAL DAILY
Status: DISCONTINUED | OUTPATIENT
Start: 2023-11-26 | End: 2023-11-26 | Stop reason: HOSPADM

## 2023-11-25 RX ADMIN — CEPHALEXIN 500 MG: 250 CAPSULE ORAL at 22:54

## 2023-11-25 RX ADMIN — RISPERIDONE 3 MG: 2 TABLET ORAL at 22:50

## 2023-11-25 RX ADMIN — Medication 3 MG: at 22:51

## 2023-11-25 RX ADMIN — DIVALPROEX SODIUM 1000 MG: 500 TABLET, EXTENDED RELEASE ORAL at 22:51

## 2023-11-25 RX ADMIN — TRAZODONE HYDROCHLORIDE 100 MG: 50 TABLET ORAL at 22:49

## 2023-11-25 RX ADMIN — PRAZOSIN HYDROCHLORIDE 2 MG: 1 CAPSULE ORAL at 22:50

## 2023-11-25 NOTE — ED NOTES
Pt has been cooperative with staff and in a pleasant demeanor. Pt was provided dinner which she was happy to eat. Pt currently has no complaints and has either been resting or watching tv. Nothing further to report at this time.       Pily Medrano  11/25/23 2652

## 2023-11-25 NOTE — ED NOTES
Cain Gold who is patient  at [facility] and  explained that patient signed a 12.  Please call Cain Gold at [phone] ext [phone] and let her know inpatient status       [place] Mary, [address] [place] [address]  11/25/23

## 2023-11-25 NOTE — ED PROVIDER NOTES
History  Chief Complaint   Patient presents with    Psychiatric Evaluation     Patient is a 46 y/o F with h/o BIpolar disorder, borderline personality disorder, CAD that presents to the ED with worsening depression and SI with a plan to jump into traffic or hit her head against the wall. SHe states she is upset because her boyfriend broke up with her and she is "fat and ugly." She wrote on her arm:  " I want to go to the [facility], I am suicidal."  She has a therapist and psychiatrist.  No recent medication changes. History provided by:  Patient  Psychiatric Evaluation  Presenting symptoms: suicidal thoughts    Associated symptoms: no chest pain        Prior to Admission Medications   Prescriptions Last Dose Informant Patient Reported? Taking? Cyanocobalamin (Vitamin B 12) 500 MCG TABS   No No   Sig: Take 1,000 mcg by mouth in the morning   Diclofenac Sodium (VOLTAREN) 1 %   No No   Sig: Apply 4 g topically 4 (four) times a day as needed (4 times daily PRN)   FLUoxetine (PROzac) 20 mg capsule   No No   Sig: Take 3 capsules (60 mg total) by mouth daily Do not start before November 15, 2023. acetaminophen (TYLENOL) 325 mg tablet   No No   Sig: Take 2 tablets (650 mg total) by mouth every 4 (four) hours as needed for moderate pain   ammonium lactate (LAC-HYDRIN) 12 % lotion   No No   Sig: Apply topically 2 (two) times a day   cholecalciferol (VITAMIN D3) 1,000 units tablet   No No   Sig: Take 1 tablet (1,000 Units total) by mouth daily Do not start before January 6, 2024. divalproex sodium (DEPAKOTE ER) 500 mg 24 hr tablet   No No   Sig: Take 2 tablets (1,000 mg total) by mouth daily at bedtime   ergocalciferol (VITAMIN D2) 50,000 units   No No   Sig: Take 1 capsule (50,000 Units total) by mouth once a week for 8 doses Do not start before November 18, 2023.    meclizine (ANTIVERT) 25 mg tablet   No No   Sig: Take 1 tablet (25 mg total) by mouth 2 (two) times a day   melatonin 3 mg   No No   Sig: Take 1 tablet (3 mg total) by mouth daily at bedtime   nicotine polacrilex (NICORETTE) 4 mg gum   No No   Sig: Chew 1 each (4 mg total) every 2 (two) hours as needed for smoking cessation   nystatin (MYCOSTATIN) powder   No No   Sig: Apply topically 2 (two) times a day as needed (As needed)   pantoprazole (PROTONIX) 20 mg tablet   No No   Sig: Take 1 tablet (20 mg total) by mouth daily   prazosin (MINIPRESS) 2 mg capsule   No No   Sig: Take 1 capsule (2 mg total) by mouth daily at bedtime   risperiDONE (RisperDAL) 2 mg tablet   No No   Sig: Take 1 tablet (2 mg total) by mouth daily Do not start before November 15, 2023. risperiDONE (RisperDAL) 3 mg tablet   No No   Sig: Take 1 tablet (3 mg total) by mouth daily at bedtime   senna-docusate sodium (SENOKOT S) 8.6-50 mg per tablet   No No   Sig: Take 2 tablets by mouth daily at bedtime   traZODone (DESYREL) 100 mg tablet   No No   Sig: Take 1 tablet (100 mg total) by mouth daily at bedtime      Facility-Administered Medications: None       Past Medical History:   Diagnosis Date    Anxiety     Bipolar 1 disorder (HCC)     Bipolar affective disorder, depressed, severe ([address]) 10/10/2021    Borderline personality disorder (HCC)     CAD (coronary artery disease)     Cognitive impairment     Depression     Dyslipidemia     GERD (gastroesophageal reflux disease)     Hypertension     Obesity     Psychiatric disorder     Psychiatric illness     PTSD (post-traumatic stress disorder)     Schizoaffective disorder (HCC)     Seizure ([address])        Past Surgical History:   Procedure Laterality Date    APPENDECTOMY      PATIENT DENIES HAVING APPENDIX REMOVED    CHOLECYSTECTOMY      FOOT SURGERY Right        Family History   Problem Relation Age of Onset    Kidney cancer Mother     Cerebral aneurysm Father      I have reviewed and agree with the history as documented.     E-Cigarette/Vaping    E-Cigarette Use Never User      E-Cigarette/Vaping Substances    Nicotine No     THC No     CBD No Flavoring No     Other No     Unknown No      Social History     Tobacco Use    Smoking status: Former    Smokeless tobacco: Never    Tobacco comments:     Pt stated "smokes when stressed"   Vaping Use    Vaping Use: Never used   Substance Use Topics    Alcohol use: Yes     Comment: occasionally    Drug use: Not Currently       Review of Systems   Constitutional:  Negative for chills and fever. Cardiovascular:  Negative for chest pain. Skin:  Negative for color change, pallor and rash. Neurological:  Negative for dizziness, weakness, light-headedness and numbness. Psychiatric/Behavioral:  Positive for suicidal ideas. All other systems reviewed and are negative. Physical Exam  Physical Exam  Vitals and nursing note reviewed. Constitutional:       General: She is not in acute distress. Appearance: Normal appearance. She is well-developed and well-groomed. She is obese. She is not ill-appearing or diaphoretic. HENT:      Head: Normocephalic and atraumatic. Right Ear: External ear normal.      Left Ear: External ear normal.      Nose: Nose normal.      Mouth/Throat:      Mouth: Mucous membranes are moist.   Eyes:      Conjunctiva/sclera: Conjunctivae normal.      Pupils: Pupils are equal.   Cardiovascular:      Rate and Rhythm: Normal rate and regular rhythm. Heart sounds: Normal heart sounds. Pulmonary:      Effort: Pulmonary effort is normal.      Breath sounds: Normal breath sounds. No wheezing, rhonchi or rales. Musculoskeletal:         General: Normal range of motion. Cervical back: Normal range of motion. Skin:     General: Skin is warm and dry. Coloration: Skin is not jaundiced or pale. Findings: No rash. Neurological:      General: No focal deficit present. Mental Status: She is alert. Mental status is at baseline. GCS: GCS eye subscore is 4. GCS verbal subscore is 5. GCS motor subscore is 6.    Psychiatric:         Mood and Affect: Mood is depressed. Affect is flat. Speech: Speech normal.         Behavior: Behavior is cooperative. Thought Content: Thought content is not paranoid or delusional. Thought content includes suicidal ideation. Thought content does not include homicidal ideation. Thought content includes suicidal plan. Thought content does not include homicidal plan.          Vital Signs  ED Triage Vitals [11/25/23 1552]   Temperature Pulse Respirations Blood Pressure SpO2   97.9 °F (36.6 °C) 78 18 138/68 96 %      Temp Source Heart Rate Source Patient Position - Orthostatic VS BP Location FiO2 (%)   Temporal Monitor Sitting Left arm --      Pain Score       --           Vitals:    11/25/23 1552   BP: 138/68   Pulse: 78   Patient Position - Orthostatic VS: Sitting         Visual Acuity      ED Medications  Medications   cephalexin (KEFLEX) capsule 500 mg (has no administration in time range)   acetaminophen (TYLENOL) tablet 650 mg (has no administration in time range)   cyanocobalamin (VITAMIN B-12) tablet 1,000 mcg (has no administration in time range)   divalproex sodium (DEPAKOTE ER) 24 hr tablet 1,000 mg (has no administration in time range)   FLUoxetine (PROzac) capsule 60 mg (has no administration in time range)   meclizine (ANTIVERT) tablet 25 mg (has no administration in time range)   melatonin tablet 3 mg (has no administration in time range)   pantoprazole (PROTONIX) EC tablet 20 mg (has no administration in time range)   prazosin (MINIPRESS) capsule 2 mg (has no administration in time range)   risperiDONE (RisperDAL) tablet 2 mg (has no administration in time range)   risperiDONE (RisperDAL) tablet 3 mg (has no administration in time range)   traZODone (DESYREL) tablet 100 mg (has no administration in time range)       Diagnostic Studies  Results Reviewed       Procedure Component Value Units Date/Time    Rapid drug screen, urine [[id number]] Collected: 11/25/23 3401    Lab Status: Final result Specimen: Urine, Other Updated: 11/25/23 1757     Amph/Meth UR Negative     Barbiturate Ur Negative     Benzodiazepine Urine Negative     Cocaine Urine Negative     Methadone Urine --     Opiate Urine Negative     PCP Ur Negative     THC Urine Negative     Oxycodone Urine Negative    Narrative:      FOR MEDICAL PURPOSES ONLY. IF CONFIRMATION NEEDED PLEASE CONTACT THE LAB WITHIN 5 DAYS. Drug Screen Cutoff Levels:  AMPHETAMINE/METHAMPHETAMINES  1000 ng/mL  BARBITURATES     200 ng/mL  BENZODIAZEPINES     200 ng/mL  COCAINE      300 ng/mL  METHADONE      300 ng/mL  OPIATES      300 ng/mL  PHENCYCLIDINE     25 ng/mL  THC       50 ng/mL  OXYCODONE      100 ng/mL    Urine Microscopic [[id number]]  (Abnormal) Collected: 11/25/23 1735    Lab Status: Final result Specimen: Urine, Other Updated: 11/25/23 1755     RBC, UA None Seen /hpf      WBC, UA 10-20 /hpf      Epithelial Cells Occasional /hpf      Bacteria, UA Occasional /hpf     Urine culture [[id number]] Collected: 11/25/23 1735    Lab Status:  In process Specimen: Urine, Other Updated: 11/25/23 1755    UA w Reflex to Microscopic w Reflex to Culture [[id number]]  (Abnormal) Collected: 11/25/23 1735    Lab Status: Final result Specimen: Urine, Other Updated: 11/25/23 1744     Color, UA Yellow     Clarity, UA Clear     Specific Gravity, UA 1.020     pH, UA 6.0     Leukocytes, UA Moderate     Nitrite, UA Negative     Protein, UA Negative mg/dl      Glucose, UA Negative mg/dl      Ketones, UA Negative mg/dl      Urobilinogen, UA 2.0 mg/dl      Bilirubin, UA Negative     Occult Blood, UA Negative    RBC Morphology Reflex Test [[id number]] Collected: 11/25/23 1604    Lab Status: Final result Specimen: Blood from Arm, Right Updated: 11/25/23 1702    Valproic acid level, total [[id number]]  (Abnormal) Collected: 11/25/23 1604    Lab Status: Final result Specimen: Blood from Arm, Right Updated: 11/25/23 1646     Valproic Acid, Total 48 ug/mL     TSH, 3rd generation with Free T4 reflex [[id number]] (Normal) Collected: 11/25/23 1604    Lab Status: Final result Specimen: Blood from Arm, Right Updated: 11/25/23 1643     TSH 3RD GENERATON 2.196 uIU/mL     Comprehensive metabolic panel [[id number]]  (Abnormal) Collected: 11/25/23 1604    Lab Status: Final result Specimen: Blood from Arm, Right Updated: 11/25/23 1643     Sodium 136 mmol/L      Potassium 4.2 mmol/L      Chloride 103 mmol/L      CO2 24 mmol/L      ANION GAP 9 mmol/L      BUN 14 mg/dL      Creatinine 0.53 mg/dL      Glucose 117 mg/dL      Calcium 8.9 mg/dL      AST 10 U/L      ALT 6 U/L      Alkaline Phosphatase 54 U/L      Total Protein 6.9 g/dL      Albumin 4.0 g/dL      Total Bilirubin 0.38 mg/dL      eGFR 109 ml/min/1.73sq m     Narrative:      National Kidney Disease Foundation guidelines for Chronic Kidney Disease (CKD):     Stage 1 with normal or high GFR (GFR > 90 mL/min/1.73 square meters)    Stage 2 Mild CKD (GFR = 60-89 mL/min/1.73 square meters)    Stage 3A Moderate CKD (GFR = 45-59 mL/min/1.73 square meters)    Stage 3B Moderate CKD (GFR = 30-44 mL/min/1.73 square meters)    Stage 4 Severe CKD (GFR = 15-29 mL/min/1.73 square meters)    Stage 5 End Stage CKD (GFR <15 mL/min/1.73 square meters)  Note: GFR calculation is accurate only with a steady state creatinine    Manual Differential(PHLEBS Do Not Order) [[id number]]  (Abnormal) Collected: 11/25/23 1604    Lab Status: Final result Specimen: Blood from Arm, Right Updated: 11/25/23 1631     Segmented % 63 %      Lymphocytes % 26 %      Monocytes % 8 %      Eosinophils, % 3 %      Basophils % 0 %      Absolute Neutrophils 3.94 Thousand/uL      Lymphocytes Absolute 1.63 Thousand/uL      Monocytes Absolute 0.50 Thousand/uL      Eosinophils Absolute 0.19 Thousand/uL      Basophils Absolute 0.00 Thousand/uL      Total Counted --     RBC Morphology Present     Platelet Estimate Borderline     Anisocytosis Present    CBC and differential [[id number]]  (Normal) Collected: 11/25/23 1604    Lab Status: Final result Specimen: Blood from Arm, Right Updated: 11/25/23 1631     WBC 6.25 Thousand/uL      RBC 3.92 Million/uL      Hemoglobin 11.9 g/dL      Hematocrit 36.7 %      MCV 94 fL      MCH 30.4 pg      MCHC 32.4 g/dL      RDW 14.4 %      MPV 9.7 fL      Platelets 634 Thousands/uL     Narrative: This is an appended report. These results have been appended to a previously verified report. Ethanol [[id number]]  (Normal) Collected: 11/25/23 1604    Lab Status: Final result Specimen: Blood from Arm, Right Updated: 11/25/23 1629     Ethanol Lvl <10 mg/dL                    No orders to display              Procedures  ECG 12 Lead Documentation Only    Date/Time: 11/25/2023 4:24 PM    Performed by: Tahira Fairbanks PA-C  Authorized by: Tahira Fairbanks PA-C    Indications / Diagnosis:  BHU clearance  ECG reviewed by me, the ED Provider: yes    Patient location:  ED  Previous ECG:     Previous ECG:  Compared to current    Similarity:  No change  Rate:     ECG rate:  87  Rhythm:     Rhythm: sinus rhythm    Conduction:     Conduction: normal    ST segments:     ST segments:  Normal  T waves:     T waves: normal             ED Course  ED Course as of 11/25/23 1809   Sat Nov 25, 2023   1623 Crisis worker aware of patient and will evaluate her in the ER.    1642 Patient signed 201. Bed search in progress. [address] transferred to DR. Parnell Seen. Bed search in progress. SBIRT 20yo+      Flowsheet Row Most Recent Value   Initial Alcohol Screen: US AUDIT-C     1. How often do you have a drink containing alcohol? 0 Filed at: 11/25/2023 1551   2. How many drinks containing alcohol do you have on a typical day you are drinking? 0 Filed at: 11/25/2023 1551   3a. Male UNDER 65: How often do you have five or more drinks on one occasion? 0 Filed at: 11/25/2023 1551   3b. FEMALE Any Age, or MALE 65+: How often do you have 4 or more drinks on one occassion?  0 Filed at: 11/25/2023 1551 Audit-C Score 0 Filed at: 11/25/2023 1551   DAT: How many times in the past year have you. .. Used an illegal drug or used a prescription medication for non-medical reasons? Never Filed at: 11/25/2023 1551                      Medical Decision Making  Amount and/or Complexity of Data Reviewed  Labs: ordered. Risk  OTC drugs. Prescription drug management. Decision regarding hospitalization. Disposition  Final diagnoses:   Depression with suicidal ideation   UTI (urinary tract infection)     Time reflects when diagnosis was documented in both MDM as applicable and the Disposition within this note       Time User Action Codes Description Comment    11/25/2023  4:23 PM Denis Bolaños Add [V56. A,  R45.851] Depression with suicidal ideation     11/25/2023  5:55 PM Denis Bolaños Add [N39.0] UTI (urinary tract infection)           ED Disposition       ED Disposition   Transfer to [address] [place] [address]   --    Date/Time   Sat Nov 25, 2023 1623    [address] should be transferred out to  and has been medically cleared. MD Documentation      [facility] Most Recent Value   Sending MD Dr Neisha Fam    None         Patient's Medications   Discharge Prescriptions    No medications on file       No discharge procedures on file.     PDMP Review         Value Time User    PDMP Reviewed  Yes 11/15/2023 10:53 AM Norvel Squibb, [address] [facility]            ED Provider  Electronically Signed by             Kevin Perea PA-C  11/25/23 4132

## 2023-11-25 NOTE — ED NOTES
Lasalle Claude is a 47 y.o.,   single, unemployed, female, who self-referred  to ED due to having SI thoughts with a plan to jump in front of a car or to bang her head against the wall. When asked about the presenting problem, patient stated, "That her boyfriend broke up with her and the voices in her head are telling her that she is fat and ugly."  Patient reported struggling with depression and these thoughts for 4 days and they are not going away. Patient reports taking her medications as RX and that her PRN is not working. Patient is SI and having auditory hallucinations Regarding substance use, patient reported none. Current stressors include living in a new group home and having problems with residents. Patient reports that she is not happy where she lives Regarding barriers, patient reported none. no access to guns. no reported  changes in appetite and reported no changes in sleep. No  Hx of legal issues. Regarding mental health treatment, patient reported that she has a therapist and psychiatrist at [address]. Therapist name is Jannette Smith and Psychiatrist is Jerman Tenorio   Patient was receptive to [phone] form was completed at this time.      Aaliyah Celis, MS  11/25/23

## 2023-11-26 VITALS
RESPIRATION RATE: 16 BRPM | BODY MASS INDEX: 41.81 KG/M2 | OXYGEN SATURATION: 96 % | WEIGHT: 260.14 LBS | HEIGHT: 66 IN | SYSTOLIC BLOOD PRESSURE: 137 MMHG | HEART RATE: 76 BPM | DIASTOLIC BLOOD PRESSURE: 86 MMHG | TEMPERATURE: 97.8 F

## 2023-11-26 LAB
ATRIAL RATE: 87 BPM
P AXIS: 50 DEGREES
PR INTERVAL: 200 MS
QRS AXIS: 53 DEGREES
QRSD INTERVAL: 82 MS
QT INTERVAL: 358 MS
QTC INTERVAL: 430 MS
T WAVE AXIS: 64 DEGREES
VENTRICULAR RATE: 87 BPM

## 2023-11-26 PROCEDURE — 93010 ELECTROCARDIOGRAM REPORT: CPT | Performed by: INTERNAL MEDICINE

## 2023-11-26 RX ADMIN — CEPHALEXIN 500 MG: 250 CAPSULE ORAL at 09:11

## 2023-11-26 RX ADMIN — FLUOXETINE 60 MG: 20 CAPSULE ORAL at 09:11

## 2023-11-26 RX ADMIN — PANTOPRAZOLE SODIUM 20 MG: 20 TABLET, DELAYED RELEASE ORAL at 09:11

## 2023-11-26 RX ADMIN — CYANOCOBALAMIN TAB 500 MCG 1000 MCG: 500 TAB at 09:11

## 2023-11-26 RX ADMIN — RISPERIDONE 2 MG: 2 TABLET ORAL at 09:11

## 2023-11-26 NOTE — ED NOTES
Patient is accepted at [facility]  Patient is accepted by Dr. Danielle Etienne per [address] is arranged with [employer].      Transportation is scheduled for [address]   Patient may go to the floor at anytime        No nurse report

## 2023-11-26 NOTE — ED NOTES
Bed Search    [place] ([address]): clinical faxed  [facility] Busing: clinical faxed  Rich Molina): clinical faxed  [place]: no bed  Chandrika Butcher): clinical faxed  [facility] Am[facility]al): clinical to be faxed after 1600  [place] Jane Sour): clinical faxed

## 2023-11-26 NOTE — EMTALA/ACUTE CARE TRANSFER
[facility] EMERGENCY DEPARTMENT  [address] [facility] [address]  Dept: [phone]      EMTALA TRANSFER CONSENT    NAME Leydi Ro                                         1970                              MRN [id number]    I have been informed of my rights regarding examination, treatment, and transfer   by Dr. Poornima Urena:      Risks: Potential for delay in receiving treatment, Potential deterioration of medical condition, Increased discomfort during transfer, Possible worsening of condition or death during transfer      Consent for Transfer:  I acknowledge that my medical condition has been evaluated and explained to me by the emergency department physician or other qualified medical person and/or my attending physician, who has recommended that I be transferred to the service of  Accepting Physician: Benjamín Doan at [address] [place] [address] [address] Name, [address] : [facility]. The above potential benefits of such transfer, the potential risks associated with such transfer, and the probable risks of not being transferred have been explained to me, and I fully understand them. The doctor has explained that, in my case, the benefits of transfer outweigh the risks. I agree to be transferred. I authorize the performance of emergency medical procedures and treatments upon me in both transit and upon arrival at the receiving facility. Additionally, I authorize the release of any and all medical records to the receiving facility and request they be transported with me, if possible. I understand that the safest mode of transportation during a medical emergency is an ambulance and that the Hospital advocates the use of this mode of transport. Risks of traveling to the receiving facility by car, including absence of medical control, life sustaining equipment, such as oxygen, and medical personnel has been explained to me and I fully understand them.     ([address])  [ ]  I consent to the stated transfer and to be transported by ambulance/helicopter. [  ]  I consent to the stated transfer, but refuse transportation by ambulance and accept full responsibility for my transportation by car. I understand the risks of non-ambulance transfers and I exonerate the Hospital and its staff from any deterioration in my condition that results from this refusal.    X___________________________________________    DATE  23  TIME________  Signature of patient or legally responsible individual signing on patient behalf           RELATIONSHIP TO PATIENT_________________________          Provider Certification    NAME Leydi Taylor                                         1970                              MRN [id number]    A medical screening exam was performed on the above named patient. Based on the examination:    Condition Necessitating Transfer The primary encounter diagnosis was Depression with suicidal ideation. A diagnosis of UTI (urinary tract infection) was also pertinent to this visit. Patient Condition: The patient has been stabilized such that within reasonable medical probability, no material deterioration of the patient condition or the condition of the unborn child(courtney) is likely to result from the transfer    Reason for Transfer: Level of Care needed not available at this facility (inpatient psych care)    Transfer Requirements: [address] available and qualified personnel available for treatment as acknowledged by    Agreed to accept transfer and to provide appropriate medical treatment as acknowledged by       [facility]  Appropriate medical records of the examination and treatment of the patient are provided at the time of transfer   [address] _______  Transfer will be performed by qualified personnel from    and appropriate transfer equipment as required, including the use of necessary and appropriate life support measures.     Provider Certification: I have examined the patient and explained the following risks and benefits of being transferred/refusing transfer to the patient/family:  General risk, such as traffic hazards, adverse weather conditions, rough terrain or turbulence, possible failure of equipment (including vehicle or aircraft), or consequences of actions of persons outside the control of the transport personnel, Unanticipated needs of medical equipment and personnel during transport, Risk of worsening condition, The possibility of a transport vehicle being unavailable, Consent was not obtained as patient is committed to psychiatric facility and transfer is mandated, The patient is stable for psychiatric transfer because they are medically stable, and is protected from harming him/herself or others during transport      Based on these reasonable risks and benefits to the patient and/or the unborn child(courtney), and based upon the information available at the time of the patient’s examination, I certify that the medical benefits reasonably to be expected from the provision of appropriate medical treatments at another medical facility outweigh the increasing risks, if any, to the individual’s medical condition, and in the case of labor to the unborn child, from effecting the transfer.     X____________________________________________ DATE 11/26/23        TIME_______      ORIGINAL - SEND TO MEDICAL RECORDS   COPY - SEND WITH PATIENT DURING TRANSFER

## 2023-11-26 NOTE — ED NOTES
Insurance Authorization for admission:   Phone call placed to [facility]  Phone number: [phone] to [employer]  5 days approved. Level of care: inpatient mental health  Review on pending admission  Authorization # pending admission    Eligibility Verification System checked - ([phone]).   Online system / automated system indicates: active

## 2023-11-26 NOTE — ED NOTES
Bed Search    SL intake- patient has to wait until Monday due to being a high utilize.  The team has to meet and see if patient can be accepted at a [address]- is reviewing chart sent   [address] is reviewing [facility] sent     Leanne Busby, [address] [facility]  11/25/23

## 2023-11-27 LAB — BACTERIA UR CULT: NORMAL

## 2023-12-23 ENCOUNTER — HOSPITAL ENCOUNTER (EMERGENCY)
Facility: HOSPITAL | Age: 53
Discharge: HOME | End: 2023-12-23
Attending: EMERGENCY MEDICINE
Payer: MEDICARE

## 2023-12-23 VITALS
WEIGHT: 260.14 LBS | DIASTOLIC BLOOD PRESSURE: 66 MMHG | OXYGEN SATURATION: 94 % | HEIGHT: 66 IN | RESPIRATION RATE: 16 BRPM | BODY MASS INDEX: 41.81 KG/M2 | TEMPERATURE: 98.9 F | SYSTOLIC BLOOD PRESSURE: 116 MMHG | HEART RATE: 104 BPM

## 2023-12-23 DIAGNOSIS — R45.851 SUICIDAL IDEATION: Primary | ICD-10-CM

## 2023-12-23 LAB
AMPHETAMINES SERPL QL SCN: NEGATIVE
BARBITURATES UR QL: NEGATIVE
BENZODIAZ UR QL: NEGATIVE
COCAINE UR QL: NEGATIVE
ETHANOL EXG-MCNC: 0 MG/DL
OPIATES UR QL SCN: NEGATIVE
OXYCODONE+OXYMORPHONE UR QL SCN: NEGATIVE
PCP UR QL: NEGATIVE
THC UR QL: NEGATIVE

## 2023-12-23 PROCEDURE — 99284 EMERGENCY DEPT VISIT MOD MDM: CPT | Performed by: EMERGENCY MEDICINE

## 2023-12-23 PROCEDURE — 82075 ASSAY OF BREATH ETHANOL: CPT | Performed by: EMERGENCY MEDICINE

## 2023-12-23 PROCEDURE — 80307 DRUG TEST PRSMV CHEM ANLYZR: CPT | Performed by: EMERGENCY MEDICINE

## 2023-12-23 PROCEDURE — 99285 EMERGENCY DEPT VISIT HI MDM: CPT

## 2023-12-24 NOTE — ED PROVIDER NOTES
"History  Chief Complaint   Patient presents with    Psychiatric Evaluation     Patient to the ED accompanied by staff member from [facility] where she resides currently.  Staff member brought copies of of suicide notes where she describes wanting to kill herself by walking into traffic.  Patient also has message written in ink on her left \"I feel suicidal suicide.\"     This is a 53-year-old female who presents from [facility] for evaluation of suicidal ideations that began approximately 1 week ago.  Patient has a history of recurrent presentations for similar complaints and she has an action plan.  She states that she feels like walking out into traffic.  She does have some superficial lacerations to the left forearm from a watchband..      History provided by:  Patient  Medical Problem  Location:  Generalized  Quality:  Anxiety and suicidal ideation  Onset quality:  Gradual  Duration:  1 week  Timing:  Constant  Progression:  Waxing and waning  Chronicity:  Recurrent  Context:  Recurrent suicidal ideation      Prior to Admission Medications   Prescriptions Last Dose Informant Patient Reported? Taking?   Cyanocobalamin (Vitamin B 12) 500 MCG TABS   No No   Sig: Take 1,000 mcg by mouth in the morning   Diclofenac Sodium (VOLTAREN) 1 %   No No   Sig: Apply 4 g topically 4 (four) times a day as needed (4 times daily PRN)   FLUoxetine (PROzac) 20 mg capsule   No No   Sig: Take 3 capsules (60 mg total) by mouth daily Do not start before November 15, 2023.   acetaminophen (TYLENOL) 325 mg tablet   No No   Sig: Take 2 tablets (650 mg total) by mouth every 4 (four) hours as needed for moderate pain   ammonium lactate (LAC-HYDRIN) 12 % lotion   No No   Sig: Apply topically 2 (two) times a day   cholecalciferol (VITAMIN D3) 1,000 units tablet   No No   Sig: Take 1 tablet (1,000 Units total) by mouth daily Do not start before January 6, 2024.   divalproex sodium (DEPAKOTE ER) 500 mg 24 hr tablet   No No   Sig: Take 2 tablets (1,000 " mg total) by mouth daily at bedtime   ergocalciferol (VITAMIN D2) 50,000 units   No No   Sig: Take 1 capsule (50,000 Units total) by mouth once a week for 8 doses Do not start before November 18, 2023.   meclizine (ANTIVERT) 25 mg tablet   No No   Sig: Take 1 tablet (25 mg total) by mouth 2 (two) times a day   melatonin 3 mg   No No   Sig: Take 1 tablet (3 mg total) by mouth daily at bedtime   nicotine polacrilex (NICORETTE) 4 mg gum   No No   Sig: Chew 1 each (4 mg total) every 2 (two) hours as needed for smoking cessation   nystatin (MYCOSTATIN) powder   No No   Sig: Apply topically 2 (two) times a day as needed (As needed)   pantoprazole (PROTONIX) 20 mg tablet   No No   Sig: Take 1 tablet (20 mg total) by mouth daily   prazosin (MINIPRESS) 2 mg capsule   No No   Sig: Take 1 capsule (2 mg total) by mouth daily at bedtime   risperiDONE (RisperDAL) 2 mg tablet   No No   Sig: Take 1 tablet (2 mg total) by mouth daily Do not start before November 15, 2023.   risperiDONE (RisperDAL) 3 mg tablet   No No   Sig: Take 1 tablet (3 mg total) by mouth daily at bedtime   senna-docusate sodium (SENOKOT S) 8.6-50 mg per tablet   No No   Sig: Take 2 tablets by mouth daily at bedtime   traZODone (DESYREL) 100 mg tablet   No No   Sig: Take 1 tablet (100 mg total) by mouth daily at bedtime      Facility-Administered Medications: None       Past Medical History:   Diagnosis Date    Anxiety     Bipolar 1 disorder (HCC)     Bipolar affective disorder, depressed, severe (HCC) 10/10/2021    Borderline personality disorder (HCC)     CAD (coronary artery disease)     Cognitive impairment     Depression     Dyslipidemia     GERD (gastroesophageal reflux disease)     Hypertension     Obesity     Psychiatric disorder     Psychiatric illness     PTSD (post-traumatic stress disorder)     Schizoaffective disorder (HCC)     Seizure ([facility])        Past Surgical History:   Procedure Laterality Date    APPENDECTOMY      PATIENT DENIES HAVING APPENDIX  "REMOVED    CHOLECYSTECTOMY      FOOT SURGERY Right        Family History   Problem Relation Age of Onset    Kidney cancer Mother     Cerebral aneurysm Father      I have reviewed and agree with the history as documented.    E-Cigarette/Vaping    E-Cigarette Use Never User      E-Cigarette/Vaping Substances    Nicotine No     THC No     CBD No     Flavoring No     Other No     Unknown No      Social History     Tobacco Use    Smoking status: Former    Smokeless tobacco: Never    Tobacco comments:     Pt stated \"smokes when stressed\"   Vaping Use    Vaping status: Never Used   Substance Use Topics    Alcohol use: Yes     Comment: occasionally    Drug use: Not Currently       Review of Systems   Psychiatric/Behavioral:  Positive for suicidal ideas.    All other systems reviewed and are negative.      Physical Exam  Physical Exam  Vitals and nursing note reviewed.   Constitutional:       General: She is not in acute distress.     Appearance: She is not ill-appearing, toxic-appearing or diaphoretic.   HENT:      Head: Normocephalic and atraumatic.      Right Ear: External ear normal.      Left Ear: External ear normal.   Eyes:      General:         Right eye: No discharge.         Left eye: No discharge.      Extraocular Movements: Extraocular movements intact.      Pupils: Pupils are equal, round, and reactive to light.   Cardiovascular:      Rate and Rhythm: Regular rhythm.      Pulses: Normal pulses.      Heart sounds: No murmur heard.     No friction rub. No gallop.   Pulmonary:      Effort: Pulmonary effort is normal. No respiratory distress.      Breath sounds: No stridor. No wheezing, rhonchi or rales.   Abdominal:      General: There is no distension.      Palpations: Abdomen is soft.      Tenderness: There is no abdominal tenderness. There is no guarding or rebound.   Musculoskeletal:         General: No swelling, tenderness, deformity or signs of injury. Normal range of motion.      Cervical back: Normal range " of motion and neck supple. No rigidity or tenderness.      Right lower leg: No edema.      Left lower leg: No edema.   Skin:     General: Skin is warm and dry.      Coloration: Skin is not jaundiced.      Findings: No bruising.      Comments: Superficial lacerations to the left forearm   Neurological:      General: No focal deficit present.      Mental Status: She is alert and oriented to person, place, and time.      Cranial Nerves: No cranial nerve deficit.      Sensory: No sensory deficit.      Coordination: Coordination normal.   Psychiatric:      Comments: Suicidal ideation to walk out into traffic         Vital Signs  ED Triage Vitals   Temperature Pulse Respirations Blood Pressure SpO2   12/23/23 1928 12/23/23 1919 12/23/23 1919 12/23/23 1919 12/23/23 1919   98.9 °F (37.2 °C) 104 16 116/66 94 %      Temp Source Heart Rate Source Patient Position - Orthostatic VS BP Location FiO2 (%)   12/23/23 1928 12/23/23 1919 -- -- --   Oral Monitor         Pain Score       --                  Vitals:    12/23/23 1919   BP: 116/66   Pulse: 104         Visual Acuity  Visual Acuity      Flowsheet Row Most Recent Value   L Pupil Size (mm) 3   R Pupil Size (mm) 3            ED Medications  Medications - No data to display    Diagnostic Studies  Results Reviewed       Procedure Component Value Units Date/Time    Rapid drug screen, urine [[id number]] Collected: 12/23/23 1939    Lab Status: Final result Specimen: Urine, Clean Catch Updated: 12/23/23 2003     Amph/Meth UR Negative     Barbiturate Ur Negative     Benzodiazepine Urine Negative     Cocaine Urine Negative     Methadone Urine --     Opiate Urine Negative     PCP Ur Negative     THC Urine Negative     Oxycodone Urine Negative    Narrative:      FOR MEDICAL PURPOSES ONLY.   IF CONFIRMATION NEEDED PLEASE CONTACT THE LAB WITHIN 5 DAYS.    Drug Screen Cutoff Levels:  AMPHETAMINE/METHAMPHETAMINES  1000 ng/mL  BARBITURATES     200 ng/mL  BENZODIAZEPINES     200  ng/mL  COCAINE      300 ng/mL  METHADONE      300 ng/mL  OPIATES      300 ng/mL  PHENCYCLIDINE     25 ng/mL  THC       50 ng/mL  OXYCODONE      100 ng/mL    POCT alcohol breath test [[id number]]  (Normal) Resulted: 12/23/23 1943    Lab Status: Final result Updated: 12/23/23 1943     EXTBreath Alcohol 0.00                   No orders to display              Procedures  Procedures         ED Course  ED Course as of 12/23/23 2101   Sat Dec 23, 2023   2059 Patient was evaluated by crisis she has a high utilizer plan therefore staff at [facility] was contacted and they will keep a close eye on her at the facility at this time it is felt that she does not require admission                               SBIRT 20yo+      Flowsheet Row Most Recent Value   Initial Alcohol Screen: US AUDIT-C     1. How often do you have a drink containing alcohol? 0 Filed at: 12/23/2023 1919   2. How many drinks containing alcohol do you have on a typical day you are drinking?  0 Filed at: 12/23/2023 1919   3a. Male UNDER 65: How often do you have five or more drinks on one occasion? 0 Filed at: 12/23/2023 1919   3b. FEMALE Any Age, or MALE 65+: How often do you have 4 or more drinks on one occassion? 0 Filed at: 12/23/2023 1919   Audit-C Score 0 Filed at: 12/23/2023 1919   DAT: How many times in the past year have you...    Used an illegal drug or used a prescription medication for non-medical reasons? Never Filed at: 12/23/2023 1919                      Medical Decision Making  Suicidal ideation with recurrent similar episodes and action plan in place will consult crisis for evaluation and disposition    Amount and/or Complexity of Data Reviewed  Labs: ordered.             Disposition  Final diagnoses:   Suicidal ideation     Time reflects when diagnosis was documented in both MDM as applicable and the Disposition within this note       Time User Action Codes Description Comment    12/23/2023  8:36 PM Aron Holder Add [R45.851] Suicidal  ideation           ED Disposition       ED Disposition   Discharge    Condition   Stable    Date/Time   Sat Dec 23, 2023 2100    Comment   Leydi Khan discharge to home/self care.                   Follow-up Information       Follow up With Specialties Details Why Contact Info Additional Information    Sundar Lowe MD Internal Medicine   [address] [address]  [phone]        [facility] Emergency Department Emergency Medicine  As needed, If symptoms worsen [address] [facility] [address] [phone] [facility] Emergency Department, [address] [place], [place] [address]    [facility] [address] [place] [address] [phone]               Patient's Medications   Discharge Prescriptions    No medications on file       No discharge procedures on file.    PDMP Review         Value Time User    PDMP Reviewed  Yes 11/15/2023 10:53 AM SHANI Hawk            ED Provider  Electronically Signed by             Aron Holder DO  12/23/23 2101

## 2023-12-24 NOTE — ED NOTES
Crisis Worker met with Patient. Patient explained that she wrote an SI note and is going to run out into traffic. Patient resides in a group home with staff. Patient is reporting that some staff are nice and pay attention to her and other staff are not nice and do not pay attention her. Leydi has staff that will be able to monitor her behavior and will able to intervene behavior. Leydi explained that she does not like her group home and does not want live there. Crisis worker will follow the high utilize behavioral health plan. Crisis worker will call staff and come up with a safe discharge back to group home.     Kell Kurse, MS 12/23/23

## 2023-12-24 NOTE — ED NOTES
This writer discussed the patients current presentation and recommended discharge plan with Dr.Joseph Holder.  They agree with the patient being discharged at this time back to group home where there is 24/7 staff who can monitor the situation     The patient was Instructed to follow up with their therapist and psychiatrist    The patient was provided with referral information for: the warm Line to talk      This writer and the patient completed a safety plan.  The patient was provided with a copy of their safety plan with encouragement to utilize the plan following discharge. The saff will continue to do 15 minute checks by staff       SAFETY PLAN  Warning Signs (thoughts, images, mood, behavior, situations) of a potential crisis: Patient will avoid people who make her angry       Coping Skills (what can I do to take my mind off the problem, or to keep myself safe): Patient will get involved with group activities provided at [facility]      Outside Support (who can I reach out to for support and help): Talk with staff, warm line or crisis intervention         National Suicide Prevention Hotline:  988      [facility] [phone] - Crisis   [facility] [phone] - LVF Crisis/Mobile Crisis   [phone] - SLPF Crisis   [facility]: [phone]  [facility]: [phone]   [facility] [phone] - Crisis   [facility] [phone] - Crisis     [facility] [phone] - Crisis   [facility] [phone] - Crisis   [phone] - Peer Support Talk Line (1-9pm daily)  [phone] - Teen Support Talk Line (1-9pm daily)  [phone] - [facility] [phone]- Crisis    [facility] [phone] - Crisis   [facility] [phone] - Crisis    [facility]) [phone] - [facility] Crisis

## 2023-12-24 NOTE — ED NOTES
This nurse spoke with crisis.  Based on patient high utilization plan, patient will be changed into paper scrubs, belongings will be taken, and she will placed on a virtual monitor.     Selma Rangel RN  12/23/23 8332

## 2023-12-24 NOTE — ED NOTES
Spoke with Venancio the  and went over the safe discharge plan. Crisis called her therapist and left a message for her to see Patient when she returns to work. Venancio explained that they are going to continue to do 15 min checks on Patient. Shift  supervisor explained that the house is remote and would be highly unlikely that she would be able to walk out into traffic. Staff will  patient by 10:30 this evening.     Kell Kruse,MS 12/23/23

## 2024-02-04 ENCOUNTER — HOSPITAL ENCOUNTER (EMERGENCY)
Facility: HOSPITAL | Age: 54
Discharge: HOME | End: 2024-02-04
Attending: EMERGENCY MEDICINE
Payer: MEDICARE

## 2024-02-04 VITALS
WEIGHT: 270 LBS | RESPIRATION RATE: 18 BRPM | OXYGEN SATURATION: 95 % | BODY MASS INDEX: 43.39 KG/M2 | SYSTOLIC BLOOD PRESSURE: 147 MMHG | TEMPERATURE: 98.3 F | DIASTOLIC BLOOD PRESSURE: 87 MMHG | HEIGHT: 66 IN | HEART RATE: 97 BPM

## 2024-02-04 DIAGNOSIS — R45.851 SUICIDAL IDEATION: Primary | ICD-10-CM

## 2024-02-04 DIAGNOSIS — Z00.8 ENCOUNTER FOR PSYCHOLOGICAL EVALUATION: ICD-10-CM

## 2024-02-04 LAB
ALBUMIN SERPL BCP-MCNC: 4.2 G/DL (ref 3.5–5)
ALP SERPL-CCNC: 56 U/L (ref 34–104)
ALT SERPL W P-5'-P-CCNC: 14 U/L (ref 7–52)
AMPHETAMINES SERPL QL SCN: NEGATIVE
ANION GAP SERPL CALCULATED.3IONS-SCNC: 7 MMOL/L
AST SERPL W P-5'-P-CCNC: 11 U/L (ref 13–39)
BACTERIA UR QL AUTO: NORMAL /HPF
BARBITURATES UR QL: NEGATIVE
BASOPHILS # BLD AUTO: 0.02 THOUSANDS/ÂΜL (ref 0–0.1)
BASOPHILS NFR BLD AUTO: 0 % (ref 0–1)
BENZODIAZ UR QL: NEGATIVE
BILIRUB SERPL-MCNC: 0.39 MG/DL (ref 0.2–1)
BILIRUB UR QL STRIP: NEGATIVE
BUN SERPL-MCNC: 12 MG/DL (ref 5–25)
CALCIUM SERPL-MCNC: 9.5 MG/DL (ref 8.4–10.2)
CHLORIDE SERPL-SCNC: 100 MMOL/L (ref 96–108)
CLARITY UR: CLEAR
CO2 SERPL-SCNC: 30 MMOL/L (ref 21–32)
COCAINE UR QL: NEGATIVE
COLOR UR: YELLOW
CREAT SERPL-MCNC: 0.85 MG/DL (ref 0.6–1.3)
EOSINOPHIL # BLD AUTO: 0.12 THOUSAND/ÂΜL (ref 0–0.61)
EOSINOPHIL NFR BLD AUTO: 2 % (ref 0–6)
ERYTHROCYTE [DISTWIDTH] IN BLOOD BY AUTOMATED COUNT: 14.4 % (ref 11.6–15.1)
ETHANOL EXG-MCNC: 0 MG/DL
GFR SERPL CREATININE-BSD FRML MDRD: 78 ML/MIN/1.73SQ M
GLUCOSE SERPL-MCNC: 157 MG/DL (ref 65–140)
GLUCOSE UR STRIP-MCNC: NEGATIVE MG/DL
HCT VFR BLD AUTO: 37.4 % (ref 34.8–46.1)
HGB BLD-MCNC: 12.2 G/DL (ref 11.5–15.4)
HGB UR QL STRIP.AUTO: NEGATIVE
IMM GRANULOCYTES # BLD AUTO: 0.08 THOUSAND/UL (ref 0–0.2)
IMM GRANULOCYTES NFR BLD AUTO: 1 % (ref 0–2)
KETONES UR STRIP-MCNC: NEGATIVE MG/DL
LEUKOCYTE ESTERASE UR QL STRIP: ABNORMAL
LYMPHOCYTES # BLD AUTO: 1.73 THOUSANDS/ÂΜL (ref 0.6–4.47)
LYMPHOCYTES NFR BLD AUTO: 25 % (ref 14–44)
MCH RBC QN AUTO: 30.1 PG (ref 26.8–34.3)
MCHC RBC AUTO-ENTMCNC: 32.6 G/DL (ref 31.4–37.4)
MCV RBC AUTO: 92 FL (ref 82–98)
METHADONE UR QL: NEGATIVE
MONOCYTES # BLD AUTO: 0.69 THOUSAND/ÂΜL (ref 0.17–1.22)
MONOCYTES NFR BLD AUTO: 10 % (ref 4–12)
NEUTROPHILS # BLD AUTO: 4.16 THOUSANDS/ÂΜL (ref 1.85–7.62)
NEUTS SEG NFR BLD AUTO: 62 % (ref 43–75)
NITRITE UR QL STRIP: NEGATIVE
NON-SQ EPI CELLS URNS QL MICRO: NORMAL /HPF
NRBC BLD AUTO-RTO: 0 /100 WBCS
OPIATES UR QL SCN: NEGATIVE
OXYCODONE+OXYMORPHONE UR QL SCN: NEGATIVE
PCP UR QL: NEGATIVE
PH UR STRIP.AUTO: 6 [PH]
PLATELET # BLD AUTO: 157 THOUSANDS/UL (ref 149–390)
PMV BLD AUTO: 9.4 FL (ref 8.9–12.7)
POTASSIUM SERPL-SCNC: 4.1 MMOL/L (ref 3.5–5.3)
PROT SERPL-MCNC: 7.9 G/DL (ref 6.4–8.4)
PROT UR STRIP-MCNC: NEGATIVE MG/DL
RBC # BLD AUTO: 4.05 MILLION/UL (ref 3.81–5.12)
RBC #/AREA URNS AUTO: NORMAL /HPF
SODIUM SERPL-SCNC: 137 MMOL/L (ref 135–147)
SP GR UR STRIP.AUTO: 1.02 (ref 1–1.03)
THC UR QL: NEGATIVE
TSH SERPL DL<=0.05 MIU/L-ACNC: 3.03 UIU/ML (ref 0.45–4.5)
UROBILINOGEN UR STRIP-ACNC: <2 MG/DL
WBC # BLD AUTO: 6.8 THOUSAND/UL (ref 4.31–10.16)
WBC #/AREA URNS AUTO: NORMAL /HPF

## 2024-02-04 PROCEDURE — 85025 COMPLETE CBC W/AUTO DIFF WBC: CPT | Performed by: EMERGENCY MEDICINE

## 2024-02-04 PROCEDURE — 80053 COMPREHEN METABOLIC PANEL: CPT | Performed by: EMERGENCY MEDICINE

## 2024-02-04 PROCEDURE — 99283 EMERGENCY DEPT VISIT LOW MDM: CPT

## 2024-02-04 PROCEDURE — 81001 URINALYSIS AUTO W/SCOPE: CPT | Performed by: EMERGENCY MEDICINE

## 2024-02-04 PROCEDURE — 80307 DRUG TEST PRSMV CHEM ANLYZR: CPT

## 2024-02-04 PROCEDURE — 36415 COLL VENOUS BLD VENIPUNCTURE: CPT | Performed by: EMERGENCY MEDICINE

## 2024-02-04 PROCEDURE — 93005 ELECTROCARDIOGRAM TRACING: CPT

## 2024-02-04 PROCEDURE — 99285 EMERGENCY DEPT VISIT HI MDM: CPT | Performed by: EMERGENCY MEDICINE

## 2024-02-04 PROCEDURE — 84443 ASSAY THYROID STIM HORMONE: CPT | Performed by: EMERGENCY MEDICINE

## 2024-02-04 PROCEDURE — 82075 ASSAY OF BREATH ETHANOL: CPT

## 2024-02-05 LAB
ATRIAL RATE: 86 BPM
P AXIS: 55 DEGREES
PR INTERVAL: 206 MS
QRS AXIS: 57 DEGREES
QRSD INTERVAL: 86 MS
QT INTERVAL: 356 MS
QTC INTERVAL: 426 MS
T WAVE AXIS: 61 DEGREES
VENTRICULAR RATE: 86 BPM

## 2024-02-05 NOTE — ED CARE HANDOFF
Emergency Department Sign Out Note        Sign out and transfer of care from . See Separate Emergency Department note.     The patient, Leydi Khan, was evaluated by the previous provider for depression.                                    ED Course as of 02/04/24 2227   Sun Feb 04, 2024 2110 Reports wanting to bang head into wall, speaking to psych   2227 Patient cleared by psychiatry for discharge no indication for further inpatient evaluation or treatment     Procedures  Medical Decision Making  Amount and/or Complexity of Data Reviewed  Labs: ordered.            Disposition  Final diagnoses:   Suicidal ideation   Encounter for psychological evaluation     Time reflects when diagnosis was documented in both MDM as applicable and the Disposition within this note       Time User Action Codes Description Comment    2/4/2024  7:38 PM Aron Holder [R45.851] Suicidal ideation     2/4/2024  9:56 PM Fatuma Arteaga [Z00.8] Encounter for psychological evaluation           ED Disposition       ED Disposition   Discharge    Condition   Stable    Date/Time   Sun Feb 4, 2024  9:56 PM    Comment   Leydi Khan discharge to home/self care.                   Follow-up Information       Follow up With Specialties Details Why Contact Info Additional Information     [facility] Emergency Department Emergency Medicine  If symptoms worsen [address] [facility] [address] [phone] [facility] Emergency Department, [address] [place], [place] [address]    Sundar Lowe MD Internal Medicine Schedule an appointment as soon as possible for a visit   [address] [address] [phone]             Patient's Medications   Discharge Prescriptions    No medications on file     No discharge procedures on file.       ED Provider  Electronically Signed by     Fatuma Arteaga DO  02/04/24 2227

## 2024-02-05 NOTE — ED PROVIDER NOTES
History  Chief Complaint   Patient presents with    Psychiatric Evaluation     Pt states that she feels like her skin is crawling. Pt states that she was hitting her head but this wasn't seen by any staff. Pt is taking her meds. Pt was given ativan at 6pm     This is a 54-year-old female who presents for crisis evaluation from [facility].  Patient states that she was hitting her head against the wall today to try encode herself.  She has been seen here many times for similar complaints has an action plan in place.  She was given Ativan prior to arrival.  She states that she is taking her medication and denies any recent changes      History provided by:  Patient  Medical Problem  Location:  Generalized  Quality:  Depression with suicidal ideation  Duration:  4 hours  Timing:  Constant  Progression:  Waxing and waning  Chronicity:  Recurrent  Context:  Suicidal ideation      Prior to Admission Medications   Prescriptions Last Dose Informant Patient Reported? Taking?   Cyanocobalamin (Vitamin B 12) 500 MCG TABS   No No   Sig: Take 1,000 mcg by mouth in the morning   Diclofenac Sodium (VOLTAREN) 1 %   No No   Sig: Apply 4 g topically 4 (four) times a day as needed (4 times daily PRN)   FLUoxetine (PROzac) 20 mg capsule   No No   Sig: Take 3 capsules (60 mg total) by mouth daily Do not start before November 15, 2023.   acetaminophen (TYLENOL) 325 mg tablet   No No   Sig: Take 2 tablets (650 mg total) by mouth every 4 (four) hours as needed for moderate pain   ammonium lactate (LAC-HYDRIN) 12 % lotion   No No   Sig: Apply topically 2 (two) times a day   cholecalciferol (VITAMIN D3) 1,000 units tablet   No No   Sig: Take 1 tablet (1,000 Units total) by mouth daily Do not start before January 6, 2024.   divalproex sodium (DEPAKOTE ER) 500 mg 24 hr tablet   No No   Sig: Take 2 tablets (1,000 mg total) by mouth daily at bedtime   ergocalciferol (VITAMIN D2) 50,000 units   No No   Sig: Take 1 capsule (50,000 Units  total) by mouth once a week for 8 doses Do not start before November 18, 2023.   meclizine (ANTIVERT) 25 mg tablet   No No   Sig: Take 1 tablet (25 mg total) by mouth 2 (two) times a day   melatonin 3 mg   No No   Sig: Take 1 tablet (3 mg total) by mouth daily at bedtime   nicotine polacrilex (NICORETTE) 4 mg gum   No No   Sig: Chew 1 each (4 mg total) every 2 (two) hours as needed for smoking cessation   nystatin (MYCOSTATIN) powder   No No   Sig: Apply topically 2 (two) times a day as needed (As needed)   pantoprazole (PROTONIX) 20 mg tablet   No No   Sig: Take 1 tablet (20 mg total) by mouth daily   prazosin (MINIPRESS) 2 mg capsule   No No   Sig: Take 1 capsule (2 mg total) by mouth daily at bedtime   risperiDONE (RisperDAL) 2 mg tablet   No No   Sig: Take 1 tablet (2 mg total) by mouth daily Do not start before November 15, 2023.   risperiDONE (RisperDAL) 3 mg tablet   No No   Sig: Take 1 tablet (3 mg total) by mouth daily at bedtime   senna-docusate sodium (SENOKOT S) 8.6-50 mg per tablet   No No   Sig: Take 2 tablets by mouth daily at bedtime   traZODone (DESYREL) 100 mg tablet   No No   Sig: Take 1 tablet (100 mg total) by mouth daily at bedtime      Facility-Administered Medications: None       Past Medical History:   Diagnosis Date    Anxiety     Bipolar 1 disorder (HCC)     Bipolar affective disorder, depressed, severe (HCC) 10/10/2021    Borderline personality disorder (HCC)     CAD (coronary artery disease)     Cognitive impairment     Depression     Dyslipidemia     GERD (gastroesophageal reflux disease)     Hypertension     Obesity     Psychiatric disorder     Psychiatric illness     PTSD (post-traumatic stress disorder)     Schizoaffective disorder (HCC)     Seizure (HCC)        Past Surgical History:   Procedure Laterality Date    APPENDECTOMY      PATIENT DENIES HAVING APPENDIX REMOVED    CHOLECYSTECTOMY      FOOT SURGERY Right        Family History   Problem Relation Age of Onset    Kidney cancer  "Mother     Cerebral aneurysm Father      I have reviewed and agree with the history as documented.    E-Cigarette/Vaping    E-Cigarette Use Never User      E-Cigarette/Vaping Substances    Nicotine No     THC No     CBD No     Flavoring No     Other No     Unknown No      Social History     Tobacco Use    Smoking status: Every Day     Types: Cigarettes, Cigars    Smokeless tobacco: Never    Tobacco comments:     Pt stated \"smokes when stressed\"   Vaping Use    Vaping status: Never Used   Substance Use Topics    Alcohol use: Not Currently     Comment: occasionally    Drug use: Not Currently       Review of Systems   Psychiatric/Behavioral:  Positive for dysphoric mood and suicidal ideas.    All other systems reviewed and are negative.      Physical Exam  Physical Exam  Vitals and nursing note reviewed.   Constitutional:       General: She is not in acute distress.     Appearance: She is not ill-appearing or toxic-appearing.   HENT:      Head: Normocephalic and atraumatic.      Right Ear: External ear normal.      Left Ear: External ear normal.   Eyes:      General:         Right eye: No discharge.         Left eye: No discharge.      Extraocular Movements: Extraocular movements intact.      Pupils: Pupils are equal, round, and reactive to light.   Cardiovascular:      Rate and Rhythm: Normal rate and regular rhythm.      Pulses: Normal pulses.      Heart sounds: No murmur heard.     No friction rub. No gallop.   Pulmonary:      Effort: Pulmonary effort is normal. No respiratory distress.      Breath sounds: No stridor. No wheezing, rhonchi or rales.   Abdominal:      General: There is no distension.      Palpations: Abdomen is soft.      Tenderness: There is no abdominal tenderness. There is no guarding or rebound.   Musculoskeletal:         General: No swelling, tenderness, deformity or signs of injury. Normal range of motion.      Cervical back: Normal range of motion and neck supple. No rigidity or tenderness.     "  Right lower leg: No edema.      Left lower leg: No edema.   Skin:     General: Skin is warm and dry.      Coloration: Skin is not jaundiced.      Findings: No bruising, erythema or rash.   Neurological:      General: No focal deficit present.      Mental Status: She is alert and oriented to person, place, and time.      Cranial Nerves: No cranial nerve deficit.      Sensory: No sensory deficit.      Motor: No weakness.      Coordination: Coordination normal.   Psychiatric:      Comments: Flat affect with depression and suicidal ideation to hit her head against a wall         Vital Signs  ED Triage Vitals [02/04/24 1907]   Temperature Pulse Respirations Blood Pressure SpO2   98.3 °F (36.8 °C) 97 18 147/87 95 %      Temp Source Heart Rate Source Patient Position - Orthostatic VS BP Location FiO2 (%)   Temporal -- -- -- --      Pain Score       --           Vitals:    02/04/24 1907   BP: 147/87   Pulse: 97         Visual Acuity      ED Medications  Medications - No data to display    Diagnostic Studies  Results Reviewed       Procedure Component Value Units Date/Time    Urine Microscopic [[id number]]  (Normal) Collected: 02/04/24 2025    Lab Status: Final result Specimen: Urine, Other Updated: 02/04/24 2143     RBC, UA None Seen /hpf      WBC, UA 1-2 /hpf      Epithelial Cells Occasional /hpf      Bacteria, UA None Seen /hpf     Narrative:      Microscopic performed by Yayo Lopes.     Rapid drug screen, urine [[id number]]  (Normal) Collected: 02/04/24 2025    Lab Status: Final result Specimen: Urine, Other Updated: 02/04/24 2041     Amph/Meth UR Negative     Barbiturate Ur Negative     Benzodiazepine Urine Negative     Cocaine Urine Negative     Methadone Urine Negative     Opiate Urine Negative     PCP Ur Negative     THC Urine Negative     Oxycodone Urine Negative    Narrative:      FOR MEDICAL PURPOSES ONLY.   IF CONFIRMATION NEEDED PLEASE CONTACT THE LAB WITHIN 5 DAYS.    Drug Screen Cutoff  Levels:  AMPHETAMINE/METHAMPHETAMINES  1000 ng/mL  BARBITURATES     200 ng/mL  BENZODIAZEPINES     200 ng/mL  COCAINE      300 ng/mL  METHADONE      300 ng/mL  OPIATES      300 ng/mL  PHENCYCLIDINE     25 ng/mL  THC       50 ng/mL  OXYCODONE      100 ng/mL    UA w Reflex to Microscopic w Reflex to Culture [[id number]]  (Abnormal) Collected: 02/04/24 2025    Lab Status: Final result Specimen: Urine, Other Updated: 02/04/24 2032     Color, UA Yellow     Clarity, UA Clear     Specific Gravity, UA 1.020     pH, UA 6.0     Leukocytes, UA Moderate     Nitrite, UA Negative     Protein, UA Negative mg/dl      Glucose, UA Negative mg/dl      Ketones, UA Negative mg/dl      Urobilinogen, UA <2.0 mg/dl      Bilirubin, UA Negative     Occult Blood, UA Negative    TSH, 3rd generation with Free T4 reflex [[id number]]  (Normal) Collected: 02/04/24 1953    Lab Status: Final result Specimen: Blood from Arm, Right Updated: 02/04/24 2031     TSH 3RD GENERATON 3.030 uIU/mL     Comprehensive metabolic panel [[id number]]  (Abnormal) Collected: 02/04/24 1953    Lab Status: Final result Specimen: Blood from Arm, Right Updated: 02/04/24 2015     Sodium 137 mmol/L      Potassium 4.1 mmol/L      Chloride 100 mmol/L      CO2 30 mmol/L      ANION GAP 7 mmol/L      BUN 12 mg/dL      Creatinine 0.85 mg/dL      Glucose 157 mg/dL      Calcium 9.5 mg/dL      AST 11 U/L      ALT 14 U/L      Alkaline Phosphatase 56 U/L      Total Protein 7.9 g/dL      Albumin 4.2 g/dL      Total Bilirubin 0.39 mg/dL      eGFR 78 ml/min/1.73sq m     Narrative:      National Kidney Disease Foundation guidelines for Chronic Kidney Disease (CKD):     Stage 1 with normal or high GFR (GFR > 90 mL/min/1.73 square meters)    Stage 2 Mild CKD (GFR = 60-89 mL/min/1.73 square meters)    Stage 3A Moderate CKD (GFR = 45-59 mL/min/1.73 square meters)    Stage 3B Moderate CKD (GFR = 30-44 mL/min/1.73 square meters)    Stage 4 Severe CKD (GFR = 15-29 mL/min/1.73 square meters)    Stage  5 End Stage CKD (GFR <15 mL/min/1.73 square meters)  Note: GFR calculation is accurate only with a steady state creatinine    CBC and differential [[id number]] Collected: 02/04/24 1953    Lab Status: Final result Specimen: Blood from Arm, Right Updated: 02/04/24 2003     WBC 6.80 Thousand/uL      RBC 4.05 Million/uL      Hemoglobin 12.2 g/dL      Hematocrit 37.4 %      MCV 92 fL      MCH 30.1 pg      MCHC 32.6 g/dL      RDW 14.4 %      MPV 9.4 fL      Platelets 157 Thousands/uL      nRBC 0 /100 WBCs      Neutrophils Relative 62 %      Immat GRANS % 1 %      Lymphocytes Relative 25 %      Monocytes Relative 10 %      Eosinophils Relative 2 %      Basophils Relative 0 %      Neutrophils Absolute 4.16 Thousands/µL      Immature Grans Absolute 0.08 Thousand/uL      Lymphocytes Absolute 1.73 Thousands/µL      Monocytes Absolute 0.69 Thousand/µL      Eosinophils Absolute 0.12 Thousand/µL      Basophils Absolute 0.02 Thousands/µL     POCT alcohol breath test [[id number]]  (Normal) Resulted: 02/04/24 1956    Lab Status: Final result Updated: 02/04/24 1956     EXTBreath Alcohol 0.000                   No orders to display              Procedures  ECG 12 Lead Documentation Only    Date/Time: 2/4/2024 8:07 PM    Performed by: Aron Holder DO  Authorized by: Aron Holder DO    ECG reviewed by me, the ED Provider: yes    Patient location:  ED  Rate:     ECG rate:  86  Rhythm:     Rhythm: sinus rhythm    Conduction:     Conduction: normal    T waves:     T waves: normal             ED Course                                             Medical Decision Making  Suicidal ideation will check labs for medical clearance and consult crisis and psychiatry    Amount and/or Complexity of Data Reviewed  Labs: ordered.             Disposition  Final diagnoses:   Suicidal ideation   Encounter for psychological evaluation     Time reflects when diagnosis was documented in both MDM as applicable and the Disposition within this note        Time User Action Codes Description Comment    2/4/2024  7:38 PM Gallo Aron Edd [R45.851] Suicidal ideation     2/4/2024  9:56 PM Fatuma Arteaga [Z00.8] Encounter for psychological evaluation           ED Disposition       ED Disposition   Discharge    Condition   Stable    Date/Time   Sun Feb 4, 2024  9:56 PM    Comment   Leydi Khan discharge to home/self care.                   Follow-up Information       Follow up With Specialties Details Why Contact Info Additional Information     [facility] Emergency Department Emergency Medicine  If symptoms worsen [address] [address] [phone] [facility] Emergency Department, [address] [place], [place] [address]    Sundar Lowe MD Internal Medicine Schedule an appointment as soon as possible for a visit   [address] [address] [phone]               Discharge Medication List as of 2/4/2024  9:57 PM        CONTINUE these medications which have NOT CHANGED    Details   acetaminophen (TYLENOL) 325 mg tablet Take 2 tablets (650 mg total) by mouth every 4 (four) hours as needed for moderate pain, Starting Tue 11/14/2023, Normal      ammonium lactate (LAC-HYDRIN) 12 % lotion Apply topically 2 (two) times a day, Starting Tue 11/14/2023, Normal      cholecalciferol (VITAMIN D3) 1,000 units tablet Take 1 tablet (1,000 Units total) by mouth daily Do not start before January 6, 2024., Starting Sat 1/6/2024, Normal      Cyanocobalamin (Vitamin B 12) 500 MCG TABS Take 1,000 mcg by mouth in the morning, Starting Tue 11/14/2023, Normal      Diclofenac Sodium (VOLTAREN) 1 % Apply 4 g topically 4 (four) times a day as needed (4 times daily PRN), Starting Tue 11/14/2023, Normal      divalproex sodium (DEPAKOTE ER) 500 mg 24 hr tablet Take 2 tablets (1,000 mg total) by mouth daily at bedtime, Starting Tue 11/14/2023, Normal       ergocalciferol (VITAMIN D2) 50,000 units Take 1 capsule (50,000 Units total) by mouth once a week for 8 doses Do not start before November 18, 2023., Starting Sat 11/18/2023, Until Sun 1/7/2024, Normal      FLUoxetine (PROzac) 20 mg capsule Take 3 capsules (60 mg total) by mouth daily Do not start before November 15, 2023., Starting Wed 11/15/2023, Normal      meclizine (ANTIVERT) 25 mg tablet Take 1 tablet (25 mg total) by mouth 2 (two) times a day, Starting Tue 11/14/2023, Normal      melatonin 3 mg Take 1 tablet (3 mg total) by mouth daily at bedtime, Starting Tue 11/14/2023, Normal      nicotine polacrilex (NICORETTE) 4 mg gum Chew 1 each (4 mg total) every 2 (two) hours as needed for smoking cessation, Starting Tue 11/14/2023, Normal      nystatin (MYCOSTATIN) powder Apply topically 2 (two) times a day as needed (As needed), Starting Tue 11/14/2023, Normal      pantoprazole (PROTONIX) 20 mg tablet Take 1 tablet (20 mg total) by mouth daily, Starting Tue 11/14/2023, Normal      prazosin (MINIPRESS) 2 mg capsule Take 1 capsule (2 mg total) by mouth daily at bedtime, Starting Tue 11/14/2023, Until Thu 12/14/2023, Normal      !! risperiDONE (RisperDAL) 2 mg tablet Take 1 tablet (2 mg total) by mouth daily Do not start before November 15, 2023., Starting Wed 11/15/2023, Normal      !! risperiDONE (RisperDAL) 3 mg tablet Take 1 tablet (3 mg total) by mouth daily at bedtime, Starting Tue 11/14/2023, Normal      senna-docusate sodium (SENOKOT S) 8.6-50 mg per tablet Take 2 tablets by mouth daily at bedtime, Starting Tue 11/14/2023, Until Thu 12/14/2023, Normal      traZODone (DESYREL) 100 mg tablet Take 1 tablet (100 mg total) by mouth daily at bedtime, Starting Tue 11/14/2023, Until Thu 12/14/2023, Normal       !! - Potential duplicate medications found. Please discuss with provider.          No discharge procedures on file.    PDMP Review         Value Time User    PDMP Reviewed  Yes 11/15/2023 10:53 AM Aditya  SHANI Farias            ED Provider  Electronically Signed by             Aron Holder DO  02/07/24 6924

## 2024-02-05 NOTE — CONSULTS
TeleConsultation - Behavioral Health   Leydi Khan 54 y.o. female MRN: [id number]  Unit/Bed#: Z1 H1 Encounter: [id number]        REQUIRED DOCUMENTATION:     1. This service was provided via Telemedicine.  2. Provider located at [place].  3. TeleMed provider: Nidia Lagunas MD.  4. Identify all parties in room with patient during tele consult: Patient   5.Patient was then informed that this was a Telemedicine visit and that the exam was being conducted confidentially over secure lines. My office door was closed. No one else was in the room.  Patient acknowledged consent and understanding of privacy and security of the Telemedicine visit, and gave us permission to have the assistant stay in the room in order to assist with the history and to conduct the exam.  I informed the patient that I have reviewed their record in Epic and presented the opportunity for them to ask any questions regarding the visit today.  The patient agreed to participate.      Discussed with  Aron Holder DO    Assessment/Plan     Present on Admission:  **None**    Assessment:    Borderline Personality Disorder    Patient presents with chronic suicidal ideation that is at baseline and no indication for voluntary or involuntary IP Psychiatric admission and stable for discharge home.     Treatment Plan:    Planned Medication Changes:    -None    Current Medications:         Risks / Benefits of Treatment:    Risks, benefits, and possible side effects of medications explained to patient and patient verbalizes understanding.      Other treatment modalities recommended as indicated:    psychotherapy  outpatient referral      Inpatient consult to Psychiatry  Consult performed by: Nidia Lagunas MD  Consult ordered by: Aron Holder DO      Physician Requesting Consult: Aron Holder DO  Principal Problem:<principal problem not specified>    Reason for Consult:  Psych Evaluation       History of Present Illness      Patient states that she has  been feeling like she is crawling out of her skin and that she has been having worsening bad thoughts. Patient states that she has been having worsening troubles with money. Patient states that she was hospitalized in November and found that to be helpful. Patient states that she liked going to therapy and groups. Patient states that she was hitting her head with her fist and that she wanted to die. Patient reports that she is still feeling suicidal and that she has been crying a lot lately. Patient states that she wants to get run over with a tractor trailer to die. Patient denied any AVH or other psychotic symptoms.      Psychiatric Review Of Systems:    sleep: yes  appetite changes: no  weight changes: no  energy/anergy: no  interest/pleasure/anhedonia: no  somatic symptoms: no  anxiety/panic: no  otoniel: no  guilty/hopeless: no  self injurious behavior/risky behavior: yes    Historical Information     Past Psychiatric History:     Psychiatric Hospitalizations:   Multiple past inpatient psychiatric admissions  Outpatient Treatment History:   current treatment with psychiatrist/Advanced Practitioner (Unsure )  Suicide Attempts:   None  History of self-harm:   None  Violence History:   no  Past Psychiatric medication trials: Multiple     Substance Abuse History: Denied       Family Psychiatric History:           Social History:    Education: high school diploma/GED  Learning Disabilities:  Denied   Marital history: single  Children: Denied   Living arrangement, social support: The patient lives in a group home under the supervision of Eileen.  Occupational History: on permanent disability  Functioning Relationships: good support system.  Other Pertinent History: None    Traumatic History:     Abuse: None  Other Traumatic Events: none    Past Medical History:   Diagnosis Date    Anxiety     Bipolar 1 disorder (HCC)     Bipolar affective disorder, depressed, severe (HCC) 10/10/2021    Borderline personality disorder  (HCC)     CAD (coronary artery disease)     Cognitive impairment     Depression     Dyslipidemia     GERD (gastroesophageal reflux disease)     Hypertension     Obesity     Psychiatric disorder     Psychiatric illness     PTSD (post-traumatic stress disorder)     Schizoaffective disorder (HCC)     Seizure (HCC)        Medical Review Of Systems:    Review of Systems    Meds/Allergies     all current active meds have been reviewed  Allergies   Allergen Reactions    Fish Oil - Food Allergy Other (See Comments)     unknown    Fish-Derived Products - Food Allergy Hives       Objective     Vital signs in last 24 hours:  Temp:  [98.3 °F (36.8 °C)] 98.3 °F (36.8 °C)  HR:  [97] 97  Resp:  [18] 18  BP: (147)/(87) 147/87    No intake or output data in the 24 hours ending 02/04/24 2034    Mental Status Evaluation:    Appearance:  age appropriate   Behavior:  normal   Speech:  normal pitch and normal volume   Mood:  normal   Affect:  normal   Language: naming objects   Thought Process:  normal   Associations intact associations   Thought Content:  normal   Perceptual Disturbances: None   Risk Potential: Suicidal Ideations Yes  Homicidal Ideations none  Potential for Aggression No   Sensorium:  person, place, and time/date   Cognition:  recent and remote memory grossly intact   Consciousness:  alert    Attention: attention span and concentration were age appropriate   Intellect: within normal limits   Fund of Knowledge: awareness of current events: President    Insight:  limited   Judgment: limited   Muscle Strength:  Muscle Tone: normal  NFT  normal   Gait/Station: normal gait/station   Motor Activity: no abnormal movements       Lab Results: I have personally reviewed all pertinent laboratory/tests results.     Most Recent Labs:   Lab Results   Component Value Date    WBC 6.80 02/04/2024    RBC 4.05 02/04/2024    HGB 12.2 02/04/2024    HCT 37.4 02/04/2024     02/04/2024    RDW 14.4 02/04/2024    NEUTROABS 4.16 02/04/2024     SODIUM 137 02/04/2024    K 4.1 02/04/2024     02/04/2024    CO2 30 02/04/2024    BUN 12 02/04/2024    CREATININE 0.85 02/04/2024    GLUC 157 (H) 02/04/2024    GLUF 130 (H) 12/13/2022    CALCIUM 9.5 02/04/2024    AST 11 (L) 02/04/2024    ALT 14 02/04/2024    ALKPHOS 56 02/04/2024    TP 7.9 02/04/2024    ALB 4.2 02/04/2024    TBILI 0.39 02/04/2024    CHOLESTEROL 212 (H) 11/03/2023    HDL 42 (L) 11/03/2023    TRIG 223 (H) 11/03/2023    LDLCALC 125 (H) 11/03/2023    NONHDLC 170 11/03/2023    VALPROICTOT 48 (L) 11/25/2023    [id number] 3.030 02/04/2024    PREGSERUM Negative 04/28/2022    RPR Non-Reactive 12/13/2022    HGBA1C 4.5 08/08/2023    EAG 82 08/08/2023       Imaging Studies: No results found.  EKG/Pathology/Other Studies:   Lab Results   Component Value Date    VENTRATE 86 02/04/2024    ATRIALRATE 86 02/04/2024    PRINT 206 02/04/2024    QRSDINT 86 02/04/2024    QTINT 356 02/04/2024    QTCINT 426 02/04/2024    PAXIS 55 02/04/2024    QRSAXIS 57 02/04/2024    TWAVEAXIS 61 02/04/2024        Code Status: Prior  Advance Directive and Living Will:      Power of :    POLST:      Screenings:    1. Nutrition Screening  Nutrition Assessment (completed by Staff):      2. Pain Screening  Pain Screening:      3. Suicide Screening  ED Crisis Suicide Risk Assessment:        Counseling / Coordination of Care:    Total floor / unit time spent today 30 minutes. Greater than 50% of total time was spent with the patient and / or family counseling and / or coordination of care. A description of the counseling / coordination of care: Direct Patient Care, Chart Review, and Documentation.

## 2024-02-07 ENCOUNTER — DOCUMENTATION (OUTPATIENT)
Dept: CASE MANAGEMENT | Facility: HOSPITAL | Age: 54
End: 2024-02-07

## 2024-02-07 ENCOUNTER — HOSPITAL ENCOUNTER (EMERGENCY)
Facility: HOSPITAL | Age: 54
End: 2024-02-07
Attending: EMERGENCY MEDICINE
Payer: MEDICARE

## 2024-02-07 VITALS
WEIGHT: 268.96 LBS | HEART RATE: 98 BPM | HEIGHT: 66 IN | RESPIRATION RATE: 18 BRPM | DIASTOLIC BLOOD PRESSURE: 73 MMHG | OXYGEN SATURATION: 97 % | BODY MASS INDEX: 43.23 KG/M2 | TEMPERATURE: 98.8 F | SYSTOLIC BLOOD PRESSURE: 122 MMHG

## 2024-02-07 DIAGNOSIS — R45.851 SUICIDAL IDEATION: Primary | ICD-10-CM

## 2024-02-07 DIAGNOSIS — R45.89 DIFFICULTY COPING: ICD-10-CM

## 2024-02-07 LAB
AMPHETAMINES SERPL QL SCN: NEGATIVE
BARBITURATES UR QL: NEGATIVE
BENZODIAZ UR QL: NEGATIVE
COCAINE UR QL: NEGATIVE
ETHANOL EXG-MCNC: 0 MG/DL
METHADONE UR QL: NEGATIVE
OPIATES UR QL SCN: NEGATIVE
OXYCODONE+OXYMORPHONE UR QL SCN: NEGATIVE
PCP UR QL: NEGATIVE
THC UR QL: NEGATIVE

## 2024-02-07 PROCEDURE — 99284 EMERGENCY DEPT VISIT MOD MDM: CPT | Performed by: EMERGENCY MEDICINE

## 2024-02-07 PROCEDURE — 80307 DRUG TEST PRSMV CHEM ANLYZR: CPT | Performed by: EMERGENCY MEDICINE

## 2024-02-07 PROCEDURE — 99285 EMERGENCY DEPT VISIT HI MDM: CPT

## 2024-02-07 PROCEDURE — 82075 ASSAY OF BREATH ETHANOL: CPT | Performed by: EMERGENCY MEDICINE

## 2024-02-07 RX ORDER — LORAZEPAM 0.5 MG/1
0.5 TABLET ORAL ONCE
Status: COMPLETED | OUTPATIENT
Start: 2024-02-07 | End: 2024-02-07

## 2024-02-07 RX ADMIN — LORAZEPAM 0.5 MG: 0.5 TABLET ORAL at 18:46

## 2024-02-07 NOTE — ED NOTES
Patient Name:Leydi Khan MRN:  [id number]         : 1970     Age: 54 y.o.    Sex: female   Utilization History:  (# of ED visits & IP admits; reasons)  Pt had 16 [facility]-ED visits from 2023-2024. ED presentations were related to SI's with no plan or a plan to cut her wrists or throat, jump or walk in front of a car or traffic, hang herself or drink bleach.  Presentations were also related to AH, feelings that no one loves or cares about her, boyfriend break-ups, self-harm superficial scratches with a thumbtack and calling 911 on herself from her group home setting.     Medically, pt has reported back pain, chest pain, pain from falling, generalized body pain, UTI symptoms.     Pt had multiple 30-day behavioral health readmissions.       Treatment Recommendations & Presentation:  Presentation in the ED: Pt will typically call 911 for EMS/police to bring her to ED's or is accompanied by her group home staff. During ED visits, pt may state that no one cares about her or loves her or that she just had a fight with her boyfriend and that he doesn't love her or they broke up. SI's with no plan or SI's with a plan to cut her wrists or throat, jump or walk in front of a car or traffic, hang herself, drink bleach or kill herself with a thumbtack. Pt also reports Auditory Hallucinations.   Pt has superficially scratched or poked herself with a thumbtack on multiple occasions and has also utilized a plastic fork to scratch herself. Pt also states she does not like living at her group [facility] and [facility] Medically, pt has reported back pain, chest pain, pain from falling, flank & body pain, UTI symptoms.  Pt will call 911 on herself in order for EMS to bring her to ED's and tends to do this at certain times when group home staffing is more limited. If pt's boyfriend is being hospitalized then pt also wants hospitalization. Pt may seek hospitalization because she spent her  allowance and is frustrated.        ED Recommendations: Following medical evaluation and treatment, allow pt time for de-escalation and for provider to establish acute risk versus pt's chronic behavioral disturbances.  Contact [facility] to develop a safe discharge plan back to [facility] [phone]. Specific contacts are: Letitia Lucia - Supervisor, Care Coordinator [phone]. [phone] or Fabio - Care Coordinator [phone] or Supervisor BRITT Betts - [phone]. [phone], or Therapist Dana from [facility] at [phone].      Home Medication Regimen: See most recent documentation in Epic, can verify medication with staff or nurse from [facility] [phone].           Recent Medical Work Ups:  (include Psych testing or ECT)     EKG's - 2023     Labs in ED's - 2023 Inpatient Recommendations: Collaborate with pt's community sources to develop a comprehensive discharge plan.           Outpatient recommendations: Pt is to continue with treatment at [facility]. [facility] staff, care coordinators, and therapist should develop a crisis plan with pt and healthy coping skills in response to her distressful feelings.           Situation/Relevant Background Info:  Pt is a 54 year old female with Cognitive Impairment, Bipolar I Disorder, and Borderline Personality Disorder along with an extensive history of behavioral health inpatient admissions.  Pt resides in a group home setting at [facility]. Pt also has Care Coordinators and receives therapy and psychiatric care through the [facility].   Pt appears to derive secondary gains from ED visits/encounters as well inpatient behavioral health (BH) admissions; therefore, group home staff has advised hospital professionals that pt should have brief behavioral health stays/hospitalizations.   Pt states that she has a boyfriend who is a support  system for her; staff confirms that pt has a boyfriend who also lives at [facility] and she will often mirror his issues - pt attempts to be hospitalized when her boyfriend is being hospitalized. Pt's romantic interests may change to other individuals at her group home.  Upon  admission, Pt often states that she does not want to be in her group home and does not want to be discharged back to her group home. Pt has a possibility of another living situation as per TIP coordinator but pt has to be stable in the community.  Pt was moved from [facility] to [facility] in 2023 and is still getting used to her new staff and housing situation. Therapist Daan states pt's most recent  admit 11/2023 was due to pt spending her allowance on take out food and then being very upset that money was gone. They intend to work on her budgeting issues.                Diagnoses/Significant Problems (Medical & Psychiatric):     Psychiatric:  Bipolar 1 Disorder, MDD, PTSD, Borderline Personality Disorder     Medical: Hyponatremia (HCC), Seizures (HCC), Obesity, Hyperlipidemia, HTN, Cognitive Impairment

## 2024-02-07 NOTE — ED NOTES
Patient given meal and fluids, tolerating well. Patient denies current complaints. [facility] [place] remains in place for safety.      Jerrica Lorenz RN  02/07/24 1611

## 2024-02-07 NOTE — ED PROVIDER NOTES
History  Chief Complaint   Patient presents with    Psychiatric Evaluation     Pt was sitting outside at [facility], stated she was sitting on a concrete wall and had thoughts of hitting her head on the wall.      54 year old female presents for evaluation of suicidal ideation.  Patient had called 911 after developing suicidal thoughts to hit her head against a concrete wall.  Per facility, patient had run out of funds and was unhappy with the dinner selection this evening.  Patient did not engage in any self harm prior to arrival.  She states she has difficulty sleeping due to racing thoughts.  Normal appetite.  Recent COVID infection from which she states she has fully recovered.  Patient states she is compliant with medications.  Facility reports that they are able to increase safety checks if she returns this evening.      Psychiatric Evaluation      Prior to Admission Medications   Prescriptions Last Dose Informant Patient Reported? Taking?   Cyanocobalamin (Vitamin B 12) 500 MCG TABS   No No   Sig: Take 1,000 mcg by mouth in the morning   Diclofenac Sodium (VOLTAREN) 1 %   No No   Sig: Apply 4 g topically 4 (four) times a day as needed (4 times daily PRN)   FLUoxetine (PROzac) 20 mg capsule   No No   Sig: Take 3 capsules (60 mg total) by mouth daily Do not start before November 15, 2023.   acetaminophen (TYLENOL) 325 mg tablet   No No   Sig: Take 2 tablets (650 mg total) by mouth every 4 (four) hours as needed for moderate pain   ammonium lactate (LAC-HYDRIN) 12 % lotion   No No   Sig: Apply topically 2 (two) times a day   cholecalciferol (VITAMIN D3) 1,000 units tablet   No No   Sig: Take 1 tablet (1,000 Units total) by mouth daily Do not start before January 6, 2024.   divalproex sodium (DEPAKOTE ER) 500 mg 24 hr tablet   No No   Sig: Take 2 tablets (1,000 mg total) by mouth daily at bedtime   ergocalciferol (VITAMIN D2) 50,000 units   No No   Sig: Take 1 capsule (50,000 Units total) by mouth once a  week for 8 doses Do not start before November 18, 2023.   meclizine (ANTIVERT) 25 mg tablet   No No   Sig: Take 1 tablet (25 mg total) by mouth 2 (two) times a day   melatonin 3 mg   No No   Sig: Take 1 tablet (3 mg total) by mouth daily at bedtime   nicotine polacrilex (NICORETTE) 4 mg gum   No No   Sig: Chew 1 each (4 mg total) every 2 (two) hours as needed for smoking cessation   nystatin (MYCOSTATIN) powder   No No   Sig: Apply topically 2 (two) times a day as needed (As needed)   pantoprazole (PROTONIX) 20 mg tablet   No No   Sig: Take 1 tablet (20 mg total) by mouth daily   prazosin (MINIPRESS) 2 mg capsule   No No   Sig: Take 1 capsule (2 mg total) by mouth daily at bedtime   risperiDONE (RisperDAL) 2 mg tablet   No No   Sig: Take 1 tablet (2 mg total) by mouth daily Do not start before November 15, 2023.   risperiDONE (RisperDAL) 3 mg tablet   No No   Sig: Take 1 tablet (3 mg total) by mouth daily at bedtime   senna-docusate sodium (SENOKOT S) 8.6-50 mg per tablet   No No   Sig: Take 2 tablets by mouth daily at bedtime   traZODone (DESYREL) 100 mg tablet   No No   Sig: Take 1 tablet (100 mg total) by mouth daily at bedtime      Facility-Administered Medications: None       Past Medical History:   Diagnosis Date    Anxiety     Bipolar 1 disorder (HCC)     Bipolar affective disorder, depressed, severe (HCC) 10/10/2021    Borderline personality disorder (HCC)     CAD (coronary artery disease)     Cognitive impairment     Depression     Dyslipidemia     GERD (gastroesophageal reflux disease)     Hypertension     Obesity     Psychiatric disorder     Psychiatric illness     PTSD (post-traumatic stress disorder)     Schizoaffective disorder (HCC)     Seizure (HCC)        Past Surgical History:   Procedure Laterality Date    APPENDECTOMY      PATIENT DENIES HAVING APPENDIX REMOVED    CHOLECYSTECTOMY      FOOT SURGERY Right        Family History   Problem Relation Age of Onset    Kidney cancer Mother     Cerebral  "aneurysm Father      I have reviewed and agree with the history as documented.    E-Cigarette/Vaping    E-Cigarette Use Never User      E-Cigarette/Vaping Substances    Nicotine No     THC No     CBD No     Flavoring No     Other No     Unknown No      Social History     Tobacco Use    Smoking status: Every Day     Current packs/day: 0.25     Average packs/day: 0.3 packs/day     Types: Cigarettes, Cigars     Start date: 2/5/2024    Smokeless tobacco: Never    Tobacco comments:     Pt stated \"smokes when stressed\"   Vaping Use    Vaping status: Never Used   Substance Use Topics    Alcohol use: Not Currently     Comment: occasionally    Drug use: Not Currently       Review of Systems    Physical Exam  Physical Exam  Vitals and nursing note reviewed.   HENT:      Head: Normocephalic and atraumatic.   Cardiovascular:      Rate and Rhythm: Normal rate and regular rhythm.      Pulses: Normal pulses.   Pulmonary:      Effort: Pulmonary effort is normal. No respiratory distress.   Abdominal:      General: There is no distension.      Palpations: Abdomen is soft.      Tenderness: There is no abdominal tenderness.   Musculoskeletal:         General: No deformity.   Skin:     General: Skin is warm and dry.   Neurological:      Mental Status: She is alert.   Psychiatric:         Mood and Affect: Affect is flat.         Thought Content: Thought content includes suicidal ideation.         Vital Signs  ED Triage Vitals [02/07/24 1711]   Temperature Pulse Respirations Blood Pressure SpO2   98.8 °F (37.1 °C) 100 18 124/75 97 %      Temp Source Heart Rate Source Patient Position - Orthostatic VS BP Location FiO2 (%)   Oral Monitor Lying Right arm --      Pain Score       No Pain           Vitals:    02/07/24 1711 02/07/24 1930   BP: 124/75 122/73   Pulse: 100 98   Patient Position - Orthostatic VS: Lying Lying         Visual Acuity      ED Medications  Medications   LORazepam (ATIVAN) tablet 0.5 mg (0.5 mg Oral Given 2/7/24 3125) "       Diagnostic Studies  Results Reviewed       Procedure Component Value Units Date/Time    Rapid drug screen, urine [[id number]]  (Normal) Collected: 02/07/24 1723    Lab Status: Final result Specimen: Urine, Clean Catch Updated: 02/07/24 1742     Amph/Meth UR Negative     Barbiturate Ur Negative     Benzodiazepine Urine Negative     Cocaine Urine Negative     Methadone Urine Negative     Opiate Urine Negative     PCP Ur Negative     THC Urine Negative     Oxycodone Urine Negative    Narrative:      FOR MEDICAL PURPOSES ONLY.   IF CONFIRMATION NEEDED PLEASE CONTACT THE LAB WITHIN 5 DAYS.    Drug Screen Cutoff Levels:  AMPHETAMINE/METHAMPHETAMINES  1000 ng/mL  BARBITURATES     200 ng/mL  BENZODIAZEPINES     200 ng/mL  COCAINE      300 ng/mL  METHADONE      300 ng/mL  OPIATES      300 ng/mL  PHENCYCLIDINE     25 ng/mL  THC       50 ng/mL  OXYCODONE      100 ng/mL    POCT alcohol breath test [[id number]]  (Normal) Resulted: 02/07/24 1717    Lab Status: Final result Updated: 02/07/24 1717     EXTBreath Alcohol 0.000                   No orders to display              Procedures  Procedures         ED Course                               SBIRT 22yo+      Flowsheet Row Most Recent Value   Initial Alcohol Screen: US AUDIT-C     1. How often do you have a drink containing alcohol? 0 Filed at: 02/07/2024 1715   2. How many drinks containing alcohol do you have on a typical day you are drinking?  0 Filed at: 02/07/2024 1715   3a. Male UNDER 65: How often do you have five or more drinks on one occasion? 0 Filed at: 02/07/2024 1715   3b. FEMALE Any Age, or MALE 65+: How often do you have 4 or more drinks on one occassion? 0 Filed at: 02/07/2024 1715   Audit-C Score 0 Filed at: 02/07/2024 1715   DAT: How many times in the past year have you...    Used an illegal drug or used a prescription medication for non-medical reasons? Never Filed at: 02/07/2024 1715                      Medical Decision Making  54 year old female  presents for evaluation of suicidal ideation.  Patient has frequent ED visits for same and an action plan is in place.  Patient has had similar presentation in the past after running out of her allowance per high utilizer care plan.  No signs of injury on exam.  Patient medically cleared.  Crisis consulted.  Patient discharged back to her facility.    Amount and/or Complexity of Data Reviewed  Labs: ordered.    Risk  Prescription drug management.             Disposition  Final diagnoses:   Suicidal ideation   Difficulty coping     Time reflects when diagnosis was documented in both MDM as applicable and the Disposition within this note       Time User Action Codes Description Comment    2/7/2024  7:33 PM Ana Maria Pappas Add [R45.851] Suicidal ideation     2/7/2024  7:34 PM Ana Maria Pappas Add [R45.89] Difficulty coping           ED Disposition       ED Disposition   Discharge    Condition   Stable    Date/Time   Wed Feb 7, 2024 1934    Comment   Leydi Khan discharge to home/self care.                   Follow-up Information       Follow up With Specialties Details Why Contact Info Additional Information     [facility] Emergency Department Emergency Medicine Go to  If symptoms worsen [address] [facility] [address] [phone] [facility] Emergency Department, [address] [place], [place] [address]            Discharge Medication List as of 2/7/2024  8:04 PM        CONTINUE these medications which have NOT CHANGED    Details   acetaminophen (TYLENOL) 325 mg tablet Take 2 tablets (650 mg total) by mouth every 4 (four) hours as needed for moderate pain, Starting Tue 11/14/2023, Normal      ammonium lactate (LAC-HYDRIN) 12 % lotion Apply topically 2 (two) times a day, Starting Tue 11/14/2023, Normal      cholecalciferol (VITAMIN D3) 1,000 units tablet Take 1 tablet (1,000 Units total) by mouth daily Do not start before January 6,  2024., Starting Sat 1/6/2024, Normal      Cyanocobalamin (Vitamin B 12) 500 MCG TABS Take 1,000 mcg by mouth in the morning, Starting Tue 11/14/2023, Normal      Diclofenac Sodium (VOLTAREN) 1 % Apply 4 g topically 4 (four) times a day as needed (4 times daily PRN), Starting Tue 11/14/2023, Normal      divalproex sodium (DEPAKOTE ER) 500 mg 24 hr tablet Take 2 tablets (1,000 mg total) by mouth daily at bedtime, Starting Tue 11/14/2023, Normal      ergocalciferol (VITAMIN D2) 50,000 units Take 1 capsule (50,000 Units total) by mouth once a week for 8 doses Do not start before November 18, 2023., Starting Sat 11/18/2023, Until Sun 1/7/2024, Normal      FLUoxetine (PROzac) 20 mg capsule Take 3 capsules (60 mg total) by mouth daily Do not start before November 15, 2023., Starting Wed 11/15/2023, Normal      meclizine (ANTIVERT) 25 mg tablet Take 1 tablet (25 mg total) by mouth 2 (two) times a day, Starting Tue 11/14/2023, Normal      melatonin 3 mg Take 1 tablet (3 mg total) by mouth daily at bedtime, Starting Tue 11/14/2023, Normal      nicotine polacrilex (NICORETTE) 4 mg gum Chew 1 each (4 mg total) every 2 (two) hours as needed for smoking cessation, Starting Tue 11/14/2023, Normal      nystatin (MYCOSTATIN) powder Apply topically 2 (two) times a day as needed (As needed), Starting Tue 11/14/2023, Normal      pantoprazole (PROTONIX) 20 mg tablet Take 1 tablet (20 mg total) by mouth daily, Starting Tue 11/14/2023, Normal      prazosin (MINIPRESS) 2 mg capsule Take 1 capsule (2 mg total) by mouth daily at bedtime, Starting Tue 11/14/2023, Until u 12/14/2023, Normal      !! risperiDONE (RisperDAL) 2 mg tablet Take 1 tablet (2 mg total) by mouth daily Do not start before November 15, 2023., Starting Wed 11/15/2023, Normal      !! risperiDONE (RisperDAL) 3 mg tablet Take 1 tablet (3 mg total) by mouth daily at bedtime, Starting Tue 11/14/2023, Normal      senna-docusate sodium (SENOKOT S) 8.6-50 mg per tablet Take 2  tablets by mouth daily at bedtime, Starting Tue 11/14/2023, Until Thu 12/14/2023, Normal      traZODone (DESYREL) 100 mg tablet Take 1 tablet (100 mg total) by mouth daily at bedtime, Starting Tue 11/14/2023, Until Thu 12/14/2023, Normal       !! - Potential duplicate medications found. Please discuss with provider.          No discharge procedures on file.    PDMP Review         Value Time User    PDMP Reviewed  Yes 11/15/2023 10:53 AM SHANI Hawk            ED Provider  Electronically Signed by             Ana Maria Pappas MD  02/08/24 0049

## 2024-02-07 NOTE — BEHAVIORAL HEALTH HIGH UTILIZER
Patient Name:Leydi Khan MRN:  [id number]         : 1970     Age: 54 y.o.    Sex: female   Utilization History:  (# of ED visits & IP admits; reasons)  Pt had 16 [facility]-ED visits from 2023-2024. ED presentations were related to SI's with no plan or a plan to cut her wrists or throat, jump or walk in front of a car or traffic, hang herself or drink bleach.  Presentations were also related to AH, feelings that no one loves or cares about her, boyfriend break-ups, self-harm superficial scratches with a thumbtack and calling 911 on herself from her group home setting.     Medically, pt has reported back pain, chest pain, pain from falling, generalized body pain, UTI symptoms.     Pt had multiple 30-day behavioral health readmissions.       Treatment Recommendations & Presentation:  Presentation in the ED: Pt will typically call 911 for EMS/police to bring her to ED's or is accompanied by her group home staff. During ED visits, pt may state that no one cares about her or loves her or that she just had a fight with her boyfriend and that he doesn't love her or they broke up. SI's with no plan or SI's with a plan to cut her wrists or throat, jump or walk in front of a car or traffic, hang herself, drink bleach or kill herself with a thumbtack. Pt also reports Auditory Hallucinations.   Pt has superficially scratched or poked herself with a thumbtack on multiple occasions and has also utilized a plastic fork to scratch herself. Pt also states she does not like living at her group home and [facility] Medically, pt has reported back pain, chest pain, pain from falling, flank & body pain, UTI symptoms.  Pt will call 911 on herself in order for EMS to bring her to ED's and tends to do this at certain times when group home staffing is more limited. If pt's boyfriend is being hospitalized then pt also wants hospitalization. Pt may seek hospitalization because she spent her  allowance and is frustrated.       ED Recommendations: Following medical evaluation and treatment, allow pt time for de-escalation and for provider to establish acute risk versus pt's chronic behavioral disturbances.  Contact [facility] to develop a safe discharge plan back to [facility] [phone]. Specific contacts are: Letitia Lucia - Supervisor, Care Coordinator [phone]. [phone] or Fabio - Care Coordinator [phone] or Supervisor BRITT Betts - [phone]. [phone], or Therapist Dana from [facility] at [phone].     Home Medication Regimen: See most recent documentation in Epic, can verify medication with staff or nurse from [facility] [phone].          Recent Medical Work Ups:  (include Psych testing or ECT)     EKG's - 2023    Labs in ED's - 2023 Inpatient Recommendations: Collaborate with pt's community sources to develop a comprehensive discharge plan.        Outpatient recommendations: Pt is to continue with treatment at [facility] staff, care coordinators, and therapist should develop a crisis plan with pt and healthy coping skills in response to her distressful feelings.          Situation/Relevant Background Info:  Pt is a 54 year old female with Cognitive Impairment, Bipolar I Disorder, and Borderline Personality Disorder along with an extensive history of behavioral health inpatient admissions.  Pt resides in a group home setting at [facility]. Pt also has Care Coordinators and receives therapy and psychiatric care through the [facility].   Pt appears to derive secondary gains from ED visits/encounters as well inpatient behavioral health (BH) admissions; therefore, group home staff has advised hospital professionals that pt should have brief behavioral health stays/hospitalizations.   Pt states that she has a boyfriend who is a support system for  her; staff confirms that pt has a boyfriend who also lives at [facility] and she will often mirror his issues - pt attempts to be hospitalized when her boyfriend is being hospitalized. Pt's romantic interests may change to other individuals at her group home.  Upon  admission, Pt often states that she does not want to be in her group home and does not want to be discharged back to her group home. Pt has a possibility of another living situation as per TIP coordinator but pt has to be stable in the community.  Pt was moved from [facility] to [facility] in 2023 and is still getting used to her new staff and housing situation. Therapist Dana states pt's most recent  admit 11/2023 was due to pt spending her allowance on take out food and then being very upset that money was gone. They intend to work on her budgeting issues.             Diagnoses/Significant Problems (Medical & Psychiatric):    Psychiatric:  Bipolar 1 Disorder, MDD, PTSD, Borderline Personality Disorder     Medical: Hyponatremia (HCC), Seizures (HCC), Obesity, Hyperlipidemia, HTN, Cognitive Impairment               Drivers of repeated utilization:  Secondary gains from ED visits and inpatient  stays, impulsivity, limited coping skills, attention seeking behaviors, wanting to be in hospital if her boyfriend is hospitalized.                                                 Existing Community Resources & Supports:                    Little to no family involvement.   Pt will state she has a boyfriend            Patient Medical & Psychiatric Care Team:  [facility] [place] [phone]  [facility] [phone]  Therapist Dana - [phone]  Letitia Lucia - supervisor care coordinator [phone]. [phone]  Fabio - care coordinator [phone]  Ivan Betts - [phone] ext. [phone], supervisor        Care plan date: 02/07/24                   Author:  Brianna Coleman,  RN                 Date reviewed with patient:

## 2024-02-07 NOTE — ED NOTES
54 y.o female patient presented to the ED via EMS with self harming behaviors. Pt is a resident of the [facility].  Pt has a Behavior Health High Utilizer plan. Pt presented to the ED this past Sunday 2/04/24  for SI with no attempt and not liking her current residence.  Pt was discharged back to her group home under the supervision of her staff.Pt reported she was command auditory hallucinations to self harm by hitting her head on a wall.  Pt reported having flash backs of when she had a miscarriage. Pt stated she does not like where she is living. Pt denies any SI, HI and visual hallucinations.  Pt currently has outpatient Psychiatry and Therapy with [facility].  Pt has multiple inpatient mental health hospitalizations.  Pt denies any legal and substance abuse issues.  [facility] staff stated if pt is discharged they can provide 24 hour supervision and q15 minute checks for safety.  As per  Utilizer plan the patient will be giving time for de-escalation and for the provider to establish acute risk verus pt's chronic behavioral disturbances.

## 2024-02-07 NOTE — ED NOTES
Received a phone call from [facility] Staff about the patient's disposition. Pt became upset today due to running out of her monthly money allowance and  what they were serving her for dinner tonight.  Staff reported she started making comments about command auditory hallucinations telling her to hit her head against a wall and that she was having flashbacks.  Staff stated she called EMS on her own.  Staff stated if pt comes back to the residence they can provide q15 minutes checks on her and staff will be observing her through out the night..  [facility] number is [phone] or [phone].

## 2024-02-08 NOTE — ED NOTES
This writer discussed the patients current presentation and recommended discharge plan with .  They agree with the patient being discharged at this time to [facility]    The patient was Instructed to follow up with their Psychiatrist.        The patient declined to complete a safety plan however a blank plan was provided for future use.      In addition, the patient was instructed to call [facility] crisis, other crisis services, 911 or to go to the nearest ER immediately if their situation changes at any time.     This writer discussed discharge plans with the patient who agrees with and understands the discharge plans.         SAFETY PLAN  Warning Signs (thoughts, images, mood, behavior, situations) of a potential crisis: declined      Coping Skills (what can I do to take my mind off the problem, or to keep myself safe): Declined      Outside Support (who can I reach out to for support and help): Declined        National Suicide Prevention Hotline:  988      [facility] [phone] - Crisis   [facility] [phone] - LVF Crisis/Mobile Crisis   [phone] - SLPF Crisis   [facility]: [phone]  [facility]: [phone]   [facility] [phone] - Crisis   [facility] [phone] - Crisis     [facility] [phone] - Crisis   [facility] [phone] - Crisis   [phone] - Peer Support Talk Line (1-9pm daily)  [phone] - Teen Support Talk Line (1-9pm daily)  [phone] - [facility]   [facility] [phone]- Crisis    [facility] [phone] - Crisis   [facility] [phone] - Crisis    [facility]) [phone] - [facility] Crisis

## 2024-02-08 NOTE — DISCHARGE INSTRUCTIONS
igned         This writer discussed the patients current presentation and recommended discharge plan with .  They agree with the patient being discharged at this time to [place] [facility]     The patient was Instructed to follow up with their Psychiatrist.         The patient declined to complete a safety plan however a blank plan was provided for future use.       In addition, the patient was instructed to call [facility] crisis, other crisis services, 911 or to go to the nearest ER immediately if their situation changes at any time.      This writer discussed discharge plans with the patient who agrees with and understands the discharge plans.            SAFETY PLAN  Warning Signs (thoughts, images, mood, behavior, situations) of a potential crisis: declined        Coping Skills (what can I do to take my mind off the problem, or to keep myself safe): Declined        Outside Support (who can I reach out to for support and help): Declined           National Suicide Prevention Hotline:  988       [facility] [phone] - Crisis   [facility] [phone] - LVF Crisis/Mobile Crisis   [phone] - SLPF Crisis   [facility]: [phone]  [facility]: [phone]   [facility] [phone] - Crisis   [facility] [phone] - Crisis      [facility] [phone] - Crisis   [facility] [phone] - Crisis   [phone] - Peer Support Talk Line (1-9pm daily)  [phone] - Teen Support Talk Line (1-9pm daily)  [phone] - [facility]   [facility] [phone]- Crisis    [facility] [phone] - Crisis   [facility] [phone] - Crisis    [facility]) [phone] - [facility] Crisis

## 2024-02-09 ENCOUNTER — HOSPITAL ENCOUNTER (EMERGENCY)
Facility: HOSPITAL | Age: 54
End: 2024-02-09
Attending: EMERGENCY MEDICINE
Payer: MEDICARE

## 2024-02-09 VITALS
OXYGEN SATURATION: 96 % | RESPIRATION RATE: 20 BRPM | HEART RATE: 100 BPM | DIASTOLIC BLOOD PRESSURE: 53 MMHG | WEIGHT: 268 LBS | HEIGHT: 66 IN | TEMPERATURE: 98.2 F | SYSTOLIC BLOOD PRESSURE: 113 MMHG | BODY MASS INDEX: 43.07 KG/M2

## 2024-02-09 DIAGNOSIS — Z72.89 DELIBERATE SELF-CUTTING: Primary | ICD-10-CM

## 2024-02-09 PROCEDURE — 99284 EMERGENCY DEPT VISIT MOD MDM: CPT | Performed by: EMERGENCY MEDICINE

## 2024-02-09 PROCEDURE — 99282 EMERGENCY DEPT VISIT SF MDM: CPT

## 2024-02-09 RX ORDER — LORAZEPAM 1 MG/1
2 TABLET ORAL ONCE
Status: COMPLETED | OUTPATIENT
Start: 2024-02-09 | End: 2024-02-09

## 2024-02-09 RX ORDER — GINSENG 100 MG
1 CAPSULE ORAL ONCE
Status: COMPLETED | OUTPATIENT
Start: 2024-02-09 | End: 2024-02-09

## 2024-02-09 RX ADMIN — LORAZEPAM 2 MG: 1 TABLET ORAL at 18:46

## 2024-02-09 RX ADMIN — BACITRACIN 1 SMALL APPLICATION: 500 OINTMENT TOPICAL at 18:46

## 2024-02-09 NOTE — ED NOTES
[facility] was called and staff reports that they are on their way to  patient.      Elida Jaffe, JOSÉ MIGUEL  02/09/24 1360

## 2024-02-09 NOTE — ED NOTES
Called [facility] ([phone]) to let them know the Pt is being discharged and is ready for .  Staff will be here shortly.

## 2024-02-09 NOTE — ED PROVIDER NOTES
History  Chief Complaint   Patient presents with    Psychiatric Evaluation     Pt arrives via EMS after staff reported that pt attempted to cut herself with a metal fork. Pt reports hearing voices.      Patient is a 54-year-old female.  She is a high utilizer of the emergency room.  She has behavioral utilizer plan.  Patient is a resident of [facility].  She became aggravated after dinner.  She ended up scraping her left forearm with a fork.  She called an ambulance telling them that she was suicidal.  Patient has done this before.  She never truly is suicidal.  She has a history of bipolar disorder, PTSD, depression, anxiety schizoaffective disorder, borderline personality disorder and cognitive impairment.  She has a history of hypertension and coronary artery disease.  She has a history of seizures.  She is compliant with her medications.        Prior to Admission Medications   Prescriptions Last Dose Informant Patient Reported? Taking?   Cyanocobalamin (Vitamin B 12) 500 MCG TABS   No No   Sig: Take 1,000 mcg by mouth in the morning   Diclofenac Sodium (VOLTAREN) 1 %   No No   Sig: Apply 4 g topically 4 (four) times a day as needed (4 times daily PRN)   FLUoxetine (PROzac) 20 mg capsule   No No   Sig: Take 3 capsules (60 mg total) by mouth daily Do not start before November 15, 2023.   acetaminophen (TYLENOL) 325 mg tablet   No No   Sig: Take 2 tablets (650 mg total) by mouth every 4 (four) hours as needed for moderate pain   ammonium lactate (LAC-HYDRIN) 12 % lotion   No No   Sig: Apply topically 2 (two) times a day   cholecalciferol (VITAMIN D3) 1,000 units tablet   No No   Sig: Take 1 tablet (1,000 Units total) by mouth daily Do not start before January 6, 2024.   divalproex sodium (DEPAKOTE ER) 500 mg 24 hr tablet   No No   Sig: Take 2 tablets (1,000 mg total) by mouth daily at bedtime   ergocalciferol (VITAMIN D2) 50,000 units   No No   Sig: Take 1 capsule (50,000 Units total) by mouth once a week for  8 doses Do not start before November 18, 2023.   meclizine (ANTIVERT) 25 mg tablet   No No   Sig: Take 1 tablet (25 mg total) by mouth 2 (two) times a day   melatonin 3 mg   No No   Sig: Take 1 tablet (3 mg total) by mouth daily at bedtime   nicotine polacrilex (NICORETTE) 4 mg gum   No No   Sig: Chew 1 each (4 mg total) every 2 (two) hours as needed for smoking cessation   nystatin (MYCOSTATIN) powder   No No   Sig: Apply topically 2 (two) times a day as needed (As needed)   pantoprazole (PROTONIX) 20 mg tablet   No No   Sig: Take 1 tablet (20 mg total) by mouth daily   prazosin (MINIPRESS) 2 mg capsule   No No   Sig: Take 1 capsule (2 mg total) by mouth daily at bedtime   risperiDONE (RisperDAL) 2 mg tablet   No No   Sig: Take 1 tablet (2 mg total) by mouth daily Do not start before November 15, 2023.   risperiDONE (RisperDAL) 3 mg tablet   No No   Sig: Take 1 tablet (3 mg total) by mouth daily at bedtime   senna-docusate sodium (SENOKOT S) 8.6-50 mg per tablet   No No   Sig: Take 2 tablets by mouth daily at bedtime   traZODone (DESYREL) 100 mg tablet   No No   Sig: Take 1 tablet (100 mg total) by mouth daily at bedtime      Facility-Administered Medications: None       Past Medical History:   Diagnosis Date    Anxiety     Bipolar 1 disorder (HCC)     Bipolar affective disorder, depressed, severe (HCC) 10/10/2021    Borderline personality disorder (HCC)     CAD (coronary artery disease)     Cognitive impairment     Depression     Dyslipidemia     GERD (gastroesophageal reflux disease)     Hypertension     Obesity     Psychiatric disorder     Psychiatric illness     PTSD (post-traumatic stress disorder)     Schizoaffective disorder (HCC)     Seizure (HCC)        Past Surgical History:   Procedure Laterality Date    APPENDECTOMY      PATIENT DENIES HAVING APPENDIX REMOVED    CHOLECYSTECTOMY      FOOT SURGERY Right        Family History   Problem Relation Age of Onset    Kidney cancer Mother     Cerebral aneurysm  "Father      I have reviewed and agree with the history as documented.    E-Cigarette/Vaping    E-Cigarette Use Never User      E-Cigarette/Vaping Substances    Nicotine No     THC No     CBD No     Flavoring No     Other No     Unknown No      Social History     Tobacco Use    Smoking status: Every Day     Current packs/day: 0.25     Average packs/day: 0.3 packs/day     Types: Cigarettes, Cigars     Start date: 2/5/2024    Smokeless tobacco: Never    Tobacco comments:     Pt stated \"smokes when stressed\"   Vaping Use    Vaping status: Never Used   Substance Use Topics    Alcohol use: Not Currently     Comment: occasionally    Drug use: Not Currently       Review of Systems   Constitutional:  Negative for chills and fever.   HENT:  Negative for rhinorrhea and sore throat.    Eyes:  Negative for pain, redness and visual disturbance.   Respiratory:  Negative for cough and shortness of breath.    Cardiovascular:  Negative for chest pain and leg swelling.   Gastrointestinal:  Negative for abdominal pain, diarrhea and vomiting.   Endocrine: Negative for polydipsia and polyuria.   Genitourinary:  Negative for dysuria, frequency, hematuria, vaginal bleeding and vaginal discharge.   Musculoskeletal:  Negative for back pain and neck pain.   Skin:  Negative for rash and wound.   Allergic/Immunologic: Negative for immunocompromised state.   Neurological:  Negative for weakness, numbness and headaches.   Hematological:  Does not bruise/bleed easily.   Psychiatric/Behavioral:  Positive for hallucinations, self-injury and suicidal ideas.    All other systems reviewed and are negative.      Physical Exam  Physical Exam  Vitals reviewed.   Constitutional:       General: She is not in acute distress.     Appearance: She is obese.   HENT:      Head: Normocephalic and atraumatic.      Nose: Nose normal.      Mouth/Throat:      Mouth: Mucous membranes are moist.   Eyes:      General:         Right eye: No discharge.         Left eye: No " discharge.      Conjunctiva/sclera: Conjunctivae normal.   Cardiovascular:      Rate and Rhythm: Normal rate and regular rhythm.      Pulses: Normal pulses.      Heart sounds: Normal heart sounds. No murmur heard.     No friction rub. No gallop.   Pulmonary:      Effort: Pulmonary effort is normal. No respiratory distress.      Breath sounds: Normal breath sounds. No stridor. No wheezing, rhonchi or rales.   Abdominal:      General: Bowel sounds are normal. There is no distension.      Palpations: Abdomen is soft.      Tenderness: There is no abdominal tenderness. There is no right CVA tenderness, left CVA tenderness, guarding or rebound.   Musculoskeletal:         General: Signs of injury present. No swelling, tenderness or deformity. Normal range of motion.      Cervical back: Normal range of motion and neck supple. No rigidity.      Right lower leg: No edema.      Left lower leg: No edema.      Comments: No calf tenderness or unilateral leg swelling.  There are superficial abrasions to the left forearm.   Skin:     General: Skin is warm and dry.      Coloration: Skin is not jaundiced.      Findings: No rash.   Neurological:      General: No focal deficit present.      Mental Status: She is alert and oriented to person, place, and time.      Sensory: No sensory deficit.      Motor: Motor function is intact.   Psychiatric:         Mood and Affect: Mood normal.         Behavior: Behavior normal.         Vital Signs  ED Triage Vitals [02/09/24 1806]   Temperature Pulse Respirations Blood Pressure SpO2   98.2 °F (36.8 °C) 94 20 113/53 93 %      Temp Source Heart Rate Source Patient Position - Orthostatic VS BP Location FiO2 (%)   Temporal Monitor Sitting Right arm --      Pain Score       No Pain           Vitals:    02/09/24 1806   BP: 113/53   Pulse: 94   Patient Position - Orthostatic VS: Sitting         Visual Acuity      ED Medications  Medications   bacitracin topical ointment 1 small application (has no  administration in time range)   LORazepam (ATIVAN) tablet 2 mg (has no administration in time range)       Diagnostic Studies  Results Reviewed       None                   No orders to display              Procedures  Procedures         ED Course                                             Medical Decision Making  Patient is not truly suicidal.  This is a chronic behavior.  The abrasions were very superficial.  She has abraded herself several times in the past.  Spoke with crisis who knows patient.  This is her usual behavior.  Reviewed high utilizer plan.  Usually patient will de-escalate in the emergency room and can be discharged.  Today's behavior is similar to prior episodes.  Patient was here 2 days ago also complaining of suicidal ideation.  Usually patient has an issue with dinner and/or runs out of money for the month.  Patient is appropriate for discharge and outpatient management.    Risk  OTC drugs.  Prescription drug management.  Decision regarding hospitalization.             Disposition  Final diagnoses:   Deliberate self-cutting     Time reflects when diagnosis was documented in both MDM as applicable and the Disposition within this note       Time User Action Codes Description Comment    2/9/2024  6:33 PM Remington Aguilar Add [Z72.89] Deliberate self-cutting           ED Disposition       ED Disposition   Discharge    Condition   Stable    Date/Time   Fri Feb 9, 2024  6:33 PM    Comment   Leydi Khan discharge to home/self care.                   Follow-up Information       Follow up With Specialties Details Why Contact Info    Follow-up with psychiatry as scheduled                Patient's Medications   Discharge Prescriptions    No medications on file       No discharge procedures on file.    PDMP Review         Value Time User    PDMP Reviewed  Yes 11/15/2023 10:53 AM SHANI Hawk            ED Provider  Electronically Signed by             Remington Aguilar MD  02/09/24 6893

## 2024-02-09 NOTE — ED NOTES
Patient Name:Leydi Khan MRN:  [id number]         : 1970     Age: 54 y.o.    Sex: female   Utilization History:  (# of ED visits & IP admits; reasons)  Pt had 16 [facility]-ED visits from 2023-2024. ED presentations were related to SI's with no plan or a plan to cut her wrists or throat, jump or walk in front of a car or traffic, hang herself or drink bleach.  Presentations were also related to AH, feelings that no one loves or cares about her, boyfriend break-ups, self-harm superficial scratches with a thumbtack and calling 911 on herself from her group home setting.     Medically, pt has reported back pain, chest pain, pain from falling, generalized body pain, UTI symptoms.     Pt had multiple 30-day behavioral health readmissions.       Treatment Recommendations & Presentation:  Presentation in the ED: Pt will typically call 911 for EMS/police to bring her to ED's or is accompanied by her group home staff. During ED visits, pt may state that no one cares about her or loves her or that she just had a fight with her boyfriend and that he doesn't love her or they broke up. SI's with no plan or SI's with a plan to cut her wrists or throat, jump or walk in front of a car or traffic, hang herself, drink bleach or kill herself with a thumbtack. Pt also reports Auditory Hallucinations.   Pt has superficially scratched or poked herself with a thumbtack on multiple occasions and has also utilized a plastic fork to scratch herself. Pt also states she does not like living at her group home and [facility] Medically, pt has reported back pain, chest pain, pain from falling, flank & body pain, UTI symptoms.  Pt will call 911 on herself in order for EMS to bring her to ED's and tends to do this at certain times when group home staffing is more limited. If pt's boyfriend is being hospitalized then pt also wants hospitalization. Pt may seek hospitalization because she spent her  allowance and is frustrated.        ED Recommendations: Following medical evaluation and treatment, allow pt time for de-escalation and for provider to establish acute risk versus pt's chronic behavioral disturbances.  Contact [facility] to develop a safe discharge plan back to [facility] [phone]. Specific contacts are: Letitia Lucia - Supervisor, Care Coordinator [phone]. [phone] or Fabio - Care Coordinator [phone] or Supervisor BRITT Betts - [phone] ext. [phone], or Therapist Dnaa from [facility] at [phone] ext [phone].      Home Medication Regimen: See most recent documentation in Epic, can verify medication with staff or nurse from [facility] [phone].           Recent Medical Work Ups:  (include Psych testing or ECT)     EKG's - 2023     Labs in ED's - 2023 Inpatient Recommendations: Collaborate with pt's community sources to develop a comprehensive discharge plan.           Outpatient recommendations: Pt is to continue with treatment at [facility]. [facility] staff, care coordinators, and therapist should develop a crisis plan with pt and healthy coping skills in response to her distressful feelings.           Situation/Relevant Background Info:  Pt is a 54 year old female with Cognitive Impairment, Bipolar I Disorder, and Borderline Personality Disorder along with an extensive history of behavioral health inpatient admissions.  Pt resides in a group home setting at [facility]. Pt also has Care Coordinators and receives therapy and psychiatric care through the [facility].   Pt appears to derive secondary gains from ED visits/encounters as well inpatient behavioral health (BH) admissions; therefore, group home staff has advised hospital professionals that pt should have brief behavioral health stays/hospitalizations.   Pt states that she has a boyfriend who is a support  system for her; staff confirms that pt has a boyfriend who also lives at [facility] and she will often mirror his issues - pt attempts to be hospitalized when her boyfriend is being hospitalized. Pt's romantic interests may change to other individuals at her group home.  Upon  admission, Pt often states that she does not want to be in her group home and does not want to be discharged back to her group home. Pt has a possibility of another living situation as per TIP coordinator but pt has to be stable in the community.  Pt was moved from [facility] to [facility] in 2023 and is still getting used to her new staff and housing situation. Therapist Dana states pt's most recent  admit 11/2023 was due to pt spending her allowance on take out food and then being very upset that money was gone. They intend to work on her budgeting issues.                Diagnoses/Significant Problems (Medical & Psychiatric):     Psychiatric:  Bipolar 1 Disorder, MDD, PTSD, Borderline Personality Disorder     Medical: Hyponatremia (HCC), Seizures (HCC), Obesity, Hyperlipidemia, HTN, Cognitive Impairment                  Drivers of repeated utilization:  Secondary gains from ED visits and inpatient  stays, impulsivity, limited coping skills, attention seeking behaviors, wanting to be in hospital if her boyfriend is hospitalized.                                                    Existing Community Resources & Supports:                    Little to no family involvement.   Pt will state she has a boyfriend             Patient Medical & Psychiatric Care Team:  [facility] [place] [phone]  [facility] [phone]  Therapist Dana - [phone]  Letitia Lucia - supervisor care coordinator [phone]. [phone]  Fabio - care coordinator [phone]  Ivan Betts - [phone] ext. [phone], supervisor         Care plan date: 02/07/24                    Author:  Brianna Coleman RN                 Date reviewed with patient:

## 2024-02-11 ENCOUNTER — HOSPITAL ENCOUNTER (EMERGENCY)
Facility: HOSPITAL | Age: 54
End: 2024-02-12
Attending: EMERGENCY MEDICINE
Payer: MEDICARE

## 2024-02-11 DIAGNOSIS — R45.851 SUICIDAL IDEATION: Primary | ICD-10-CM

## 2024-02-11 DIAGNOSIS — E66.01 CLASS 3 SEVERE OBESITY DUE TO EXCESS CALORIES WITHOUT SERIOUS COMORBIDITY WITH BODY MASS INDEX (BMI) OF 45.0 TO 49.9 IN ADULT (HCC): ICD-10-CM

## 2024-02-11 DIAGNOSIS — E78.2 MIXED HYPERLIPIDEMIA: ICD-10-CM

## 2024-02-11 DIAGNOSIS — I10 PRIMARY HYPERTENSION: ICD-10-CM

## 2024-02-11 LAB
ALBUMIN SERPL BCP-MCNC: 4.1 G/DL (ref 3.5–5)
ALP SERPL-CCNC: 58 U/L (ref 34–104)
ALT SERPL W P-5'-P-CCNC: 15 U/L (ref 7–52)
AMPHETAMINES SERPL QL SCN: NEGATIVE
ANION GAP SERPL CALCULATED.3IONS-SCNC: 7 MMOL/L
AST SERPL W P-5'-P-CCNC: 14 U/L (ref 13–39)
BARBITURATES UR QL: NEGATIVE
BASOPHILS # BLD AUTO: 0.03 THOUSANDS/ÂΜL (ref 0–0.1)
BASOPHILS NFR BLD AUTO: 0 % (ref 0–1)
BENZODIAZ UR QL: NEGATIVE
BILIRUB SERPL-MCNC: 0.47 MG/DL (ref 0.2–1)
BUN SERPL-MCNC: 14 MG/DL (ref 5–25)
CALCIUM SERPL-MCNC: 9.7 MG/DL (ref 8.4–10.2)
CHLORIDE SERPL-SCNC: 98 MMOL/L (ref 96–108)
CO2 SERPL-SCNC: 30 MMOL/L (ref 21–32)
COCAINE UR QL: NEGATIVE
CREAT SERPL-MCNC: 0.72 MG/DL (ref 0.6–1.3)
EOSINOPHIL # BLD AUTO: 0.16 THOUSAND/ÂΜL (ref 0–0.61)
EOSINOPHIL NFR BLD AUTO: 2 % (ref 0–6)
ERYTHROCYTE [DISTWIDTH] IN BLOOD BY AUTOMATED COUNT: 14.6 % (ref 11.6–15.1)
ETHANOL EXG-MCNC: 0 MG/DL
GFR SERPL CREATININE-BSD FRML MDRD: 95 ML/MIN/1.73SQ M
GLUCOSE SERPL-MCNC: 116 MG/DL (ref 65–140)
HCT VFR BLD AUTO: 38.1 % (ref 34.8–46.1)
HGB BLD-MCNC: 12.7 G/DL (ref 11.5–15.4)
IMM GRANULOCYTES # BLD AUTO: 0.12 THOUSAND/UL (ref 0–0.2)
IMM GRANULOCYTES NFR BLD AUTO: 2 % (ref 0–2)
LYMPHOCYTES # BLD AUTO: 1.7 THOUSANDS/ÂΜL (ref 0.6–4.47)
LYMPHOCYTES NFR BLD AUTO: 21 % (ref 14–44)
MCH RBC QN AUTO: 30.6 PG (ref 26.8–34.3)
MCHC RBC AUTO-ENTMCNC: 33.3 G/DL (ref 31.4–37.4)
MCV RBC AUTO: 92 FL (ref 82–98)
METHADONE UR QL: NEGATIVE
MONOCYTES # BLD AUTO: 0.87 THOUSAND/ÂΜL (ref 0.17–1.22)
MONOCYTES NFR BLD AUTO: 11 % (ref 4–12)
NEUTROPHILS # BLD AUTO: 5.36 THOUSANDS/ÂΜL (ref 1.85–7.62)
NEUTS SEG NFR BLD AUTO: 64 % (ref 43–75)
NRBC BLD AUTO-RTO: 0 /100 WBCS
OPIATES UR QL SCN: NEGATIVE
OXYCODONE+OXYMORPHONE UR QL SCN: NEGATIVE
PCP UR QL: NEGATIVE
PLATELET # BLD AUTO: 158 THOUSANDS/UL (ref 149–390)
PMV BLD AUTO: 9.3 FL (ref 8.9–12.7)
POTASSIUM SERPL-SCNC: 4.1 MMOL/L (ref 3.5–5.3)
PROT SERPL-MCNC: 7.7 G/DL (ref 6.4–8.4)
RBC # BLD AUTO: 4.15 MILLION/UL (ref 3.81–5.12)
SODIUM SERPL-SCNC: 135 MMOL/L (ref 135–147)
THC UR QL: NEGATIVE
TSH SERPL DL<=0.05 MIU/L-ACNC: 2.77 UIU/ML (ref 0.45–4.5)
WBC # BLD AUTO: 8.24 THOUSAND/UL (ref 4.31–10.16)

## 2024-02-11 PROCEDURE — 80053 COMPREHEN METABOLIC PANEL: CPT | Performed by: EMERGENCY MEDICINE

## 2024-02-11 PROCEDURE — 80307 DRUG TEST PRSMV CHEM ANLYZR: CPT | Performed by: EMERGENCY MEDICINE

## 2024-02-11 PROCEDURE — 82607 VITAMIN B-12: CPT

## 2024-02-11 PROCEDURE — 84443 ASSAY THYROID STIM HORMONE: CPT | Performed by: EMERGENCY MEDICINE

## 2024-02-11 PROCEDURE — 93005 ELECTROCARDIOGRAM TRACING: CPT

## 2024-02-11 PROCEDURE — 99285 EMERGENCY DEPT VISIT HI MDM: CPT | Performed by: EMERGENCY MEDICINE

## 2024-02-11 PROCEDURE — 82075 ASSAY OF BREATH ETHANOL: CPT | Performed by: EMERGENCY MEDICINE

## 2024-02-11 PROCEDURE — 99285 EMERGENCY DEPT VISIT HI MDM: CPT

## 2024-02-11 PROCEDURE — 85025 COMPLETE CBC W/AUTO DIFF WBC: CPT | Performed by: EMERGENCY MEDICINE

## 2024-02-11 PROCEDURE — 36415 COLL VENOUS BLD VENIPUNCTURE: CPT | Performed by: EMERGENCY MEDICINE

## 2024-02-11 RX ORDER — DIPHENHYDRAMINE HCL 25 MG
25 CAPSULE ORAL 2 TIMES DAILY PRN
COMMUNITY
End: 2024-02-22

## 2024-02-11 RX ORDER — LISINOPRIL 20 MG/1
20 TABLET ORAL DAILY
Status: ON HOLD | COMMUNITY
End: 2024-02-19

## 2024-02-11 RX ORDER — DESVENLAFAXINE SUCCINATE 50 MG/1
25 TABLET, EXTENDED RELEASE ORAL DAILY
COMMUNITY
End: 2024-02-22

## 2024-02-11 RX ORDER — CLONAZEPAM 1 MG/1
1 TABLET ORAL 2 TIMES DAILY
COMMUNITY
End: 2024-02-22

## 2024-02-11 RX ORDER — VILAZODONE HYDROCHLORIDE 40 MG/1
40 TABLET ORAL
COMMUNITY
End: 2024-02-22

## 2024-02-11 RX ORDER — QUETIAPINE FUMARATE 25 MG/1
25 TABLET, FILM COATED ORAL
COMMUNITY
End: 2024-02-22

## 2024-02-11 RX ORDER — ATORVASTATIN CALCIUM 10 MG/1
10 TABLET, FILM COATED ORAL
Status: ON HOLD | COMMUNITY
End: 2024-02-19

## 2024-02-11 NOTE — LETTER
[facility] EMERGENCY DEPARTMENT  [address]  [facility] [address]  Dept: [phone]      EMTALA TRANSFER CONSENT    NAME Leydi VELASQUEZ 1970                              MRN [id number]    I have been informed of my rights regarding examination, treatment, and transfer   by Dr. Aisha villela. providers found    Benefits: Continuity of care    Risks: Potential for delay in receiving treatment      Consent for Transfer:  I acknowledge that my medical condition has been evaluated and explained to me by the emergency department physician or other qualified medical person and/or my attending physician, who has recommended that I be transferred to the service of  Accepting Physician: Dr Anthony at Accepting Facility Name, City & State : [place], [place]. The above potential benefits of such transfer, the potential risks associated with such transfer, and the probable risks of not being transferred have been explained to me, and I fully understand them.  The doctor has explained that, in my case, the benefits of transfer outweigh the risks.  I agree to be transferred.    I authorize the performance of emergency medical procedures and treatments upon me in both transit and upon arrival at the receiving facility.  Additionally, I authorize the release of any and all medical records to the receiving facility and request they be transported with me, if possible.  I understand that the safest mode of transportation during a medical emergency is an ambulance and that the Hospital advocates the use of this mode of transport. Risks of traveling to the receiving facility by car, including absence of medical control, life sustaining equipment, such as oxygen, and medical personnel has been explained to me and I fully understand them.    (PRAKASH CORRECT BOX BELOW)  [  ]  I consent to the stated transfer and to be transported by  ambulance/helicopter.  [  ]  I consent to the stated transfer, but refuse transportation by ambulance and accept full responsibility for my transportation by car.  I understand the risks of non-ambulance transfers and I exonerate the Hospital and its staff from any deterioration in my condition that results from this refusal.    X___________________________________________    DATE  24  TIME________  Signature of patient or legally responsible individual signing on patient behalf           RELATIONSHIP TO PATIENT_________________________          Provider Certification    NAME Leydi Khan                                         1970                              MRN [id number]    A medical screening exam was performed on the above named patient.  Based on the examination:    Condition Necessitating Transfer The primary encounter diagnosis was Suicidal ideation. Diagnoses of Class 3 severe obesity due to excess calories without serious comorbidity with body mass index (BMI) of 45.0 to 49.9 in adult (HCC), Primary hypertension, and Mixed hyperlipidemia were also pertinent to this visit.    Patient Condition: The patient has been stabilized such that within reasonable medical probability, no material deterioration of the patient condition or the condition of the unborn child(courtney) is likely to result from the transfer    Reason for Transfer: Level of Care needed not available at this facility    Transfer Requirements: [place], [place]   Space available and qualified personnel available for treatment as acknowledged by Elza  Agreed to accept transfer and to provide appropriate medical treatment as acknowledged by       Dr Anthony  Appropriate medical records of the examination and treatment of the patient are provided at the time of transfer   STAFF INITIAL WHEN COMPLETED _______  Transfer will be performed by qualified personnel from    and appropriate transfer equipment as  required, including the use of necessary and appropriate life support measures.    Provider Certification: I have examined the patient and explained the following risks and benefits of being transferred/refusing transfer to the patient/family:  The patient is stable for psychiatric transfer because they are medically stable, and is protected from harming him/herself or others during transport      Based on these reasonable risks and benefits to the patient and/or the unborn child(courtney), and based upon the information available at the time of the patient’s examination, I certify that the medical benefits reasonably to be expected from the provision of appropriate medical treatments at another medical facility outweigh the increasing risks, if any, to the individual’s medical condition, and in the case of labor to the unborn child, from effecting the transfer.    X____________________________________________ DATE 02/12/24        TIME_______      ORIGINAL - SEND TO MEDICAL RECORDS   COPY - SEND WITH PATIENT DURING TRANSFER

## 2024-02-12 ENCOUNTER — HOSPITAL ENCOUNTER (INPATIENT)
Facility: HOSPITAL | Age: 54
LOS: 10 days | Discharge: HOME | DRG: 885 | End: 2024-02-22
Attending: PSYCHIATRY & NEUROLOGY | Admitting: PSYCHIATRY & NEUROLOGY
Payer: MEDICARE

## 2024-02-12 VITALS
RESPIRATION RATE: 18 BRPM | TEMPERATURE: 98.7 F | OXYGEN SATURATION: 94 % | SYSTOLIC BLOOD PRESSURE: 140 MMHG | DIASTOLIC BLOOD PRESSURE: 79 MMHG | HEART RATE: 99 BPM

## 2024-02-12 DIAGNOSIS — E55.9 VITAMIN D DEFICIENCY: ICD-10-CM

## 2024-02-12 DIAGNOSIS — Z72.0 TOBACCO ABUSE: ICD-10-CM

## 2024-02-12 DIAGNOSIS — I10 PRIMARY HYPERTENSION: ICD-10-CM

## 2024-02-12 DIAGNOSIS — L85.3 DRY SKIN: ICD-10-CM

## 2024-02-12 DIAGNOSIS — E66.01 CLASS 3 SEVERE OBESITY DUE TO EXCESS CALORIES WITHOUT SERIOUS COMORBIDITY WITH BODY MASS INDEX (BMI) OF 45.0 TO 49.9 IN ADULT (HCC): ICD-10-CM

## 2024-02-12 DIAGNOSIS — M54.50 CHRONIC MIDLINE LOW BACK PAIN WITHOUT SCIATICA: ICD-10-CM

## 2024-02-12 DIAGNOSIS — G89.29 CHRONIC MIDLINE LOW BACK PAIN WITHOUT SCIATICA: ICD-10-CM

## 2024-02-12 DIAGNOSIS — E78.2 MIXED HYPERLIPIDEMIA: ICD-10-CM

## 2024-02-12 DIAGNOSIS — R42 DIZZINESS: ICD-10-CM

## 2024-02-12 DIAGNOSIS — R07.89 ATYPICAL CHEST PAIN: ICD-10-CM

## 2024-02-12 DIAGNOSIS — F33.3 MDD (MAJOR DEPRESSIVE DISORDER), RECURRENT, SEVERE, WITH PSYCHOSIS (HCC): Primary | ICD-10-CM

## 2024-02-12 DIAGNOSIS — F32.3 MAJOR DEPRESSION WITH PSYCHOTIC FEATURES (HCC): ICD-10-CM

## 2024-02-12 DIAGNOSIS — G47.00 INSOMNIA: ICD-10-CM

## 2024-02-12 LAB
25(OH)D3 SERPL-MCNC: 15.2 NG/ML (ref 30–100)
ATRIAL RATE: 98 BPM
FOLATE SERPL-MCNC: 14.3 NG/ML
P AXIS: 45 DEGREES
PR INTERVAL: 188 MS
QRS AXIS: 37 DEGREES
QRSD INTERVAL: 82 MS
QT INTERVAL: 344 MS
QTC INTERVAL: 439 MS
T WAVE AXIS: 58 DEGREES
VENTRICULAR RATE: 98 BPM

## 2024-02-12 PROCEDURE — 82746 ASSAY OF FOLIC ACID SERUM: CPT

## 2024-02-12 PROCEDURE — G0008 ADMIN INFLUENZA VIRUS VAC: HCPCS | Performed by: FAMILY MEDICINE

## 2024-02-12 PROCEDURE — 90686 IIV4 VACC NO PRSV 0.5 ML IM: CPT | Performed by: FAMILY MEDICINE

## 2024-02-12 PROCEDURE — 82306 VITAMIN D 25 HYDROXY: CPT

## 2024-02-12 RX ORDER — VILAZODONE HYDROCHLORIDE 40 MG/1
40 TABLET ORAL DAILY
Status: DISCONTINUED | OUTPATIENT
Start: 2024-02-12 | End: 2024-02-12 | Stop reason: HOSPADM

## 2024-02-12 RX ORDER — HYDROXYZINE HYDROCHLORIDE 25 MG/1
25 TABLET, FILM COATED ORAL
Status: CANCELLED | OUTPATIENT
Start: 2024-02-12

## 2024-02-12 RX ORDER — DIVALPROEX SODIUM 500 MG/1
1000 TABLET, DELAYED RELEASE ORAL
Status: DISCONTINUED | OUTPATIENT
Start: 2024-02-12 | End: 2024-02-12 | Stop reason: HOSPADM

## 2024-02-12 RX ORDER — ACETAMINOPHEN 325 MG/1
975 TABLET ORAL ONCE
Status: COMPLETED | OUTPATIENT
Start: 2024-02-12 | End: 2024-02-12

## 2024-02-12 RX ORDER — DESVENLAFAXINE 25 MG/1
25 TABLET, EXTENDED RELEASE ORAL DAILY
Status: CANCELLED | OUTPATIENT
Start: 2024-02-13

## 2024-02-12 RX ORDER — OLANZAPINE 2.5 MG/1
2.5 TABLET, FILM COATED ORAL
Status: CANCELLED | OUTPATIENT
Start: 2024-02-12

## 2024-02-12 RX ORDER — PANTOPRAZOLE SODIUM 20 MG/1
20 TABLET, DELAYED RELEASE ORAL
Status: CANCELLED | OUTPATIENT
Start: 2024-02-13

## 2024-02-12 RX ORDER — LANOLIN ALCOHOL/MO/W.PET/CERES
3 CREAM (GRAM) TOPICAL
Status: CANCELLED | OUTPATIENT
Start: 2024-02-12

## 2024-02-12 RX ORDER — PANTOPRAZOLE SODIUM 20 MG/1
20 TABLET, DELAYED RELEASE ORAL
Status: DISCONTINUED | OUTPATIENT
Start: 2024-02-12 | End: 2024-02-12 | Stop reason: HOSPADM

## 2024-02-12 RX ORDER — ACETAMINOPHEN 325 MG/1
975 TABLET ORAL EVERY 6 HOURS PRN
Status: DISCONTINUED | OUTPATIENT
Start: 2024-02-12 | End: 2024-02-14

## 2024-02-12 RX ORDER — BENZTROPINE MESYLATE 0.5 MG/1
0.5 TABLET ORAL
Status: CANCELLED | OUTPATIENT
Start: 2024-02-12

## 2024-02-12 RX ORDER — DIVALPROEX SODIUM 250 MG/1
1000 TABLET, DELAYED RELEASE ORAL
Status: CANCELLED | OUTPATIENT
Start: 2024-02-12

## 2024-02-12 RX ORDER — LISINOPRIL 20 MG/1
20 TABLET ORAL DAILY
Status: DISCONTINUED | OUTPATIENT
Start: 2024-02-13 | End: 2024-02-22 | Stop reason: HOSPADM

## 2024-02-12 RX ORDER — PRAZOSIN HYDROCHLORIDE 1 MG/1
2 CAPSULE ORAL
Status: DISCONTINUED | OUTPATIENT
Start: 2024-02-12 | End: 2024-02-22 | Stop reason: HOSPADM

## 2024-02-12 RX ORDER — LORAZEPAM 2 MG/ML
1 INJECTION INTRAMUSCULAR
Status: DISCONTINUED | OUTPATIENT
Start: 2024-02-12 | End: 2024-02-22 | Stop reason: HOSPADM

## 2024-02-12 RX ORDER — LANOLIN ALCOHOL/MO/W.PET/CERES
3 CREAM (GRAM) TOPICAL
Status: DISCONTINUED | OUTPATIENT
Start: 2024-02-12 | End: 2024-02-22 | Stop reason: HOSPADM

## 2024-02-12 RX ORDER — OLANZAPINE 10 MG/2ML
5 INJECTION, POWDER, FOR SOLUTION INTRAMUSCULAR
Status: DISCONTINUED | OUTPATIENT
Start: 2024-02-12 | End: 2024-02-22 | Stop reason: HOSPADM

## 2024-02-12 RX ORDER — OLANZAPINE 5 MG/1
5 TABLET ORAL
Status: DISCONTINUED | OUTPATIENT
Start: 2024-02-12 | End: 2024-02-22 | Stop reason: HOSPADM

## 2024-02-12 RX ORDER — TRAZODONE HYDROCHLORIDE 50 MG/1
50 TABLET ORAL
Status: DISCONTINUED | OUTPATIENT
Start: 2024-02-12 | End: 2024-02-22 | Stop reason: HOSPADM

## 2024-02-12 RX ORDER — LORAZEPAM 0.5 MG/1
0.5 TABLET ORAL
Status: CANCELLED | OUTPATIENT
Start: 2024-02-12

## 2024-02-12 RX ORDER — OLANZAPINE 10 MG/2ML
5 INJECTION, POWDER, FOR SOLUTION INTRAMUSCULAR
Status: CANCELLED | OUTPATIENT
Start: 2024-02-12

## 2024-02-12 RX ORDER — VILAZODONE HYDROCHLORIDE 40 MG/1
40 TABLET ORAL DAILY
Status: CANCELLED | OUTPATIENT
Start: 2024-02-13

## 2024-02-12 RX ORDER — LORAZEPAM 1 MG/1
1 TABLET ORAL
Status: CANCELLED | OUTPATIENT
Start: 2024-02-12

## 2024-02-12 RX ORDER — QUETIAPINE FUMARATE 25 MG/1
25 TABLET, FILM COATED ORAL
Status: CANCELLED | OUTPATIENT
Start: 2024-02-12

## 2024-02-12 RX ORDER — LISINOPRIL 20 MG/1
20 TABLET ORAL DAILY
Status: CANCELLED | OUTPATIENT
Start: 2024-02-13

## 2024-02-12 RX ORDER — ACETAMINOPHEN 325 MG/1
975 TABLET ORAL EVERY 6 HOURS PRN
Status: CANCELLED | OUTPATIENT
Start: 2024-02-12

## 2024-02-12 RX ORDER — PANTOPRAZOLE SODIUM 20 MG/1
20 TABLET, DELAYED RELEASE ORAL
Status: DISCONTINUED | OUTPATIENT
Start: 2024-02-13 | End: 2024-02-22 | Stop reason: HOSPADM

## 2024-02-12 RX ORDER — DESVENLAFAXINE 25 MG/1
25 TABLET, EXTENDED RELEASE ORAL DAILY
Status: DISCONTINUED | OUTPATIENT
Start: 2024-02-12 | End: 2024-02-12 | Stop reason: HOSPADM

## 2024-02-12 RX ORDER — ACETAMINOPHEN 325 MG/1
650 TABLET ORAL EVERY 4 HOURS PRN
Status: DISCONTINUED | OUTPATIENT
Start: 2024-02-12 | End: 2024-02-22 | Stop reason: HOSPADM

## 2024-02-12 RX ORDER — QUETIAPINE FUMARATE 25 MG/1
25 TABLET, FILM COATED ORAL
Status: DISCONTINUED | OUTPATIENT
Start: 2024-02-12 | End: 2024-02-12 | Stop reason: HOSPADM

## 2024-02-12 RX ORDER — RISPERIDONE 3 MG/1
3 TABLET ORAL 2 TIMES DAILY
COMMUNITY
End: 2024-02-22

## 2024-02-12 RX ORDER — ACETAMINOPHEN 325 MG/1
650 TABLET ORAL EVERY 4 HOURS PRN
Status: CANCELLED | OUTPATIENT
Start: 2024-02-12

## 2024-02-12 RX ORDER — DIVALPROEX SODIUM 500 MG/1
1000 TABLET, DELAYED RELEASE ORAL
Status: DISCONTINUED | OUTPATIENT
Start: 2024-02-12 | End: 2024-02-22 | Stop reason: HOSPADM

## 2024-02-12 RX ORDER — OLANZAPINE 5 MG/1
5 TABLET ORAL
Status: CANCELLED | OUTPATIENT
Start: 2024-02-12

## 2024-02-12 RX ORDER — ATORVASTATIN CALCIUM 10 MG/1
10 TABLET, FILM COATED ORAL
Status: DISCONTINUED | OUTPATIENT
Start: 2024-02-12 | End: 2024-02-12

## 2024-02-12 RX ORDER — LORAZEPAM 1 MG/1
1 TABLET ORAL
Status: DISCONTINUED | OUTPATIENT
Start: 2024-02-12 | End: 2024-02-22 | Stop reason: HOSPADM

## 2024-02-12 RX ORDER — ATORVASTATIN CALCIUM 10 MG/1
10 TABLET, FILM COATED ORAL
Status: CANCELLED | OUTPATIENT
Start: 2024-02-12

## 2024-02-12 RX ORDER — LISINOPRIL 20 MG/1
20 TABLET ORAL DAILY
Status: DISCONTINUED | OUTPATIENT
Start: 2024-02-12 | End: 2024-02-12 | Stop reason: HOSPADM

## 2024-02-12 RX ORDER — BENZTROPINE MESYLATE 0.5 MG/1
0.5 TABLET ORAL
Status: DISCONTINUED | OUTPATIENT
Start: 2024-02-12 | End: 2024-02-22 | Stop reason: HOSPADM

## 2024-02-12 RX ORDER — OLANZAPINE 2.5 MG/1
2.5 TABLET, FILM COATED ORAL
Status: DISCONTINUED | OUTPATIENT
Start: 2024-02-12 | End: 2024-02-22 | Stop reason: HOSPADM

## 2024-02-12 RX ORDER — PRAZOSIN HYDROCHLORIDE 1 MG/1
2 CAPSULE ORAL
Status: DISCONTINUED | OUTPATIENT
Start: 2024-02-12 | End: 2024-02-12 | Stop reason: HOSPADM

## 2024-02-12 RX ORDER — PRAZOSIN HYDROCHLORIDE 1 MG/1
2 CAPSULE ORAL
Status: CANCELLED | OUTPATIENT
Start: 2024-02-12

## 2024-02-12 RX ORDER — HYDROXYZINE HYDROCHLORIDE 25 MG/1
25 TABLET, FILM COATED ORAL
Status: DISCONTINUED | OUTPATIENT
Start: 2024-02-12 | End: 2024-02-22 | Stop reason: HOSPADM

## 2024-02-12 RX ORDER — DIVALPROEX SODIUM 500 MG/1
1000 TABLET, DELAYED RELEASE ORAL
Status: ON HOLD | COMMUNITY
Start: 2023-12-20 | End: 2024-02-21

## 2024-02-12 RX ORDER — LORAZEPAM 2 MG/ML
1 INJECTION INTRAMUSCULAR
Status: CANCELLED | OUTPATIENT
Start: 2024-02-12

## 2024-02-12 RX ORDER — ATORVASTATIN CALCIUM 10 MG/1
10 TABLET, FILM COATED ORAL
Status: DISCONTINUED | OUTPATIENT
Start: 2024-02-12 | End: 2024-02-12 | Stop reason: HOSPADM

## 2024-02-12 RX ORDER — ATORVASTATIN CALCIUM 10 MG/1
10 TABLET, FILM COATED ORAL
Status: DISCONTINUED | OUTPATIENT
Start: 2024-02-13 | End: 2024-02-22 | Stop reason: HOSPADM

## 2024-02-12 RX ORDER — CLONAZEPAM 0.5 MG/1
1 TABLET ORAL 2 TIMES DAILY PRN
Status: DISCONTINUED | OUTPATIENT
Start: 2024-02-12 | End: 2024-02-12 | Stop reason: HOSPADM

## 2024-02-12 RX ORDER — QUETIAPINE FUMARATE 25 MG/1
25 TABLET, FILM COATED ORAL
Status: DISCONTINUED | OUTPATIENT
Start: 2024-02-12 | End: 2024-02-13

## 2024-02-12 RX ORDER — VILAZODONE HYDROCHLORIDE 40 MG/1
40 TABLET ORAL DAILY
Status: DISCONTINUED | OUTPATIENT
Start: 2024-02-13 | End: 2024-02-13

## 2024-02-12 RX ORDER — TRAZODONE HYDROCHLORIDE 50 MG/1
50 TABLET ORAL
Status: CANCELLED | OUTPATIENT
Start: 2024-02-12

## 2024-02-12 RX ORDER — LORAZEPAM 0.5 MG/1
0.5 TABLET ORAL
Status: DISCONTINUED | OUTPATIENT
Start: 2024-02-12 | End: 2024-02-22 | Stop reason: HOSPADM

## 2024-02-12 RX ORDER — LANOLIN ALCOHOL/MO/W.PET/CERES
3 CREAM (GRAM) TOPICAL
Status: DISCONTINUED | OUTPATIENT
Start: 2024-02-12 | End: 2024-02-12 | Stop reason: HOSPADM

## 2024-02-12 RX ORDER — DESVENLAFAXINE 25 MG/1
25 TABLET, EXTENDED RELEASE ORAL DAILY
Status: DISCONTINUED | OUTPATIENT
Start: 2024-02-13 | End: 2024-02-13

## 2024-02-12 RX ORDER — DIPHENHYDRAMINE HCL 25 MG
25 TABLET ORAL 2 TIMES DAILY PRN
Status: DISCONTINUED | OUTPATIENT
Start: 2024-02-12 | End: 2024-02-12 | Stop reason: HOSPADM

## 2024-02-12 RX ADMIN — INFLUENZA VIRUS VACCINE 0.5 ML: 15; 15; 15; 15 SUSPENSION INTRAMUSCULAR at 20:47

## 2024-02-12 RX ADMIN — DESVENLAFAXINE SUCCINATE 25 MG: 25 TABLET, FILM COATED, EXTENDED RELEASE ORAL at 09:05

## 2024-02-12 RX ADMIN — PRAZOSIN HYDROCHLORIDE 2 MG: 1 CAPSULE ORAL at 20:46

## 2024-02-12 RX ADMIN — RISPERIDONE 3 MG: 1 TABLET ORAL at 20:46

## 2024-02-12 RX ADMIN — LISINOPRIL 20 MG: 20 TABLET ORAL at 09:05

## 2024-02-12 RX ADMIN — Medication 3 MG: at 20:46

## 2024-02-12 RX ADMIN — QUETIAPINE FUMARATE 25 MG: 25 TABLET ORAL at 20:45

## 2024-02-12 RX ADMIN — QUETIAPINE FUMARATE 25 MG: 25 TABLET ORAL at 01:55

## 2024-02-12 RX ADMIN — ACETAMINOPHEN 975 MG: 325 TABLET, FILM COATED ORAL at 12:04

## 2024-02-12 RX ADMIN — VILAZODONE HYDROCHLORIDE 40 MG: 40 TABLET ORAL at 09:05

## 2024-02-12 RX ADMIN — RISPERIDONE 3 MG: 2 TABLET ORAL at 01:55

## 2024-02-12 RX ADMIN — DIVALPROEX SODIUM 1000 MG: 500 TABLET, DELAYED RELEASE ORAL at 01:55

## 2024-02-12 RX ADMIN — PANTOPRAZOLE SODIUM 20 MG: 20 TABLET, DELAYED RELEASE ORAL at 09:04

## 2024-02-12 RX ADMIN — ATORVASTATIN CALCIUM 10 MG: 10 TABLET, FILM COATED ORAL at 01:56

## 2024-02-12 RX ADMIN — Medication 3 MG: at 01:56

## 2024-02-12 RX ADMIN — PRAZOSIN HYDROCHLORIDE 2 MG: 1 CAPSULE ORAL at 01:55

## 2024-02-12 RX ADMIN — DIVALPROEX SODIUM 1000 MG: 500 TABLET, DELAYED RELEASE ORAL at 20:44

## 2024-02-12 RX ADMIN — RISPERIDONE 3 MG: 2 TABLET ORAL at 09:04

## 2024-02-12 NOTE — ED NOTES
Pt has high utilizer plan and to have [facility] eval to determine appropriate psych eval.  [id number] setup with virtual rounding.

## 2024-02-12 NOTE — ED NOTES
Bed Search    INTAKE (faxed for AM review)  Julius (Carito) fax for AM review for d/c bed  Sapna Garcia (no answer)  Varinder (no answer)  Pato (Nahum) no beds  Friends (Nuris) no beds  Haven (Salma) OON for CB  Chandrika (Nimco) fax for review  Daryl (no answer)  Kitty (Kenny) OON for CBH

## 2024-02-12 NOTE — ED PROVIDER NOTES
"History  Chief Complaint   Patient presents with    Psychiatric Evaluation     Pt arrives via EMS after pt reports cutting her left arm with a plastic fork.      Patient is a 54 year old female who is brought in by ambulance due to SI. Patient was seen on 2/9 after using a fork to scratch up her forearm as a suicide attempt. Today, she wrote \"Kill Leydi\" and \"I am suicidal\" on her left arm. Patient reports that she currently still feels suicidal. Does not have a plan. Denies HI, AH, VH.             Prior to Admission Medications   Prescriptions Last Dose Informant Patient Reported? Taking?   Cyanocobalamin (Vitamin B 12) 500 MCG TABS Not Taking  No No   Sig: Take 1,000 mcg by mouth in the morning   Patient not taking: Reported on 2/11/2024   Diclofenac Sodium (VOLTAREN) 1 % Not Taking  No No   Sig: Apply 4 g topically 4 (four) times a day as needed (4 times daily PRN)   Patient not taking: Reported on 2/11/2024   FLUoxetine (PROzac) 20 mg capsule Not Taking  No No   Sig: Take 3 capsules (60 mg total) by mouth daily Do not start before November 15, 2023.   Patient not taking: Reported on 2/11/2024   QUEtiapine (SEROquel) 25 mg tablet 2/10/2024  Yes Yes   Sig: Take 25 mg by mouth daily at bedtime   acetaminophen (TYLENOL) 325 mg tablet More than a month  No No   Sig: Take 2 tablets (650 mg total) by mouth every 4 (four) hours as needed for moderate pain   ammonium lactate (LAC-HYDRIN) 12 % lotion Not Taking  No No   Sig: Apply topically 2 (two) times a day   Patient not taking: Reported on 2/11/2024   atorvastatin (LIPITOR) 10 mg tablet 2/10/2024  Yes Yes   Sig: Take 10 mg by mouth daily   cholecalciferol (VITAMIN D3) 1,000 units tablet Not Taking  No No   Sig: Take 1 tablet (1,000 Units total) by mouth daily Do not start before January 6, 2024.   Patient not taking: Reported on 2/11/2024   clonazePAM (KlonoPIN) 1 mg tablet 2/10/2024  Yes Yes   Sig: Take 1 mg by mouth 2 (two) times a day   desvenlafaxine succinate " (PRISTIQ) 50 mg 24 hr tablet 2/10/2024  Yes Yes   Sig: Take 25 mg by mouth daily   diphenhydrAMINE (BENADRYL) 25 mg capsule More than a month  Yes No   Sig: Take 25 mg by mouth every 6 (six) hours as needed for itching   divalproex sodium (DEPAKOTE ER) 500 mg 24 hr tablet 2/10/2024  No Yes   Sig: Take 2 tablets (1,000 mg total) by mouth daily at bedtime   ergocalciferol (VITAMIN D2) 50,000 units   No No   Sig: Take 1 capsule (50,000 Units total) by mouth once a week for 8 doses Do not start before November 18, 2023.   lisinopril (ZESTRIL) 20 mg tablet 2/10/2024  Yes Yes   Sig: Take 20 mg by mouth daily   meclizine (ANTIVERT) 25 mg tablet Not Taking  No No   Sig: Take 1 tablet (25 mg total) by mouth 2 (two) times a day   Patient not taking: Reported on 2/11/2024   melatonin 3 mg 2/10/2024  No Yes   Sig: Take 1 tablet (3 mg total) by mouth daily at bedtime   nicotine polacrilex (NICORETTE) 4 mg gum Not Taking  No No   Sig: Chew 1 each (4 mg total) every 2 (two) hours as needed for smoking cessation   Patient not taking: Reported on 2/11/2024   nystatin (MYCOSTATIN) powder Not Taking  No No   Sig: Apply topically 2 (two) times a day as needed (As needed)   Patient not taking: Reported on 2/11/2024   pantoprazole (PROTONIX) 20 mg tablet 2/10/2024  No Yes   Sig: Take 1 tablet (20 mg total) by mouth daily   prazosin (MINIPRESS) 2 mg capsule   No Yes   Sig: Take 1 capsule (2 mg total) by mouth daily at bedtime   risperiDONE (RisperDAL) 2 mg tablet Not Taking  No No   Sig: Take 1 tablet (2 mg total) by mouth daily Do not start before November 15, 2023.   Patient not taking: Reported on 2/11/2024   risperiDONE (RisperDAL) 3 mg tablet 2/10/2024  No Yes   Sig: Take 1 tablet (3 mg total) by mouth daily at bedtime   senna-docusate sodium (SENOKOT S) 8.6-50 mg per tablet   No No   Sig: Take 2 tablets by mouth daily at bedtime   traZODone (DESYREL) 100 mg tablet   No No   Sig: Take 1 tablet (100 mg total) by mouth daily at bedtime  "  vilazodone (VIIBRYD) 40 mg tablet 2/11/2024  Yes Yes   Sig: Take 40 mg by mouth daily with breakfast      Facility-Administered Medications: None       Past Medical History:   Diagnosis Date    Anxiety     Bipolar 1 disorder (HCC)     Bipolar affective disorder, depressed, severe (HCC) 10/10/2021    Borderline personality disorder (HCC)     CAD (coronary artery disease)     Cognitive impairment     Depression     Dyslipidemia     GERD (gastroesophageal reflux disease)     Hypertension     Obesity     Psychiatric disorder     Psychiatric illness     PTSD (post-traumatic stress disorder)     Schizoaffective disorder (HCC)     Seizure (HCC)        Past Surgical History:   Procedure Laterality Date    APPENDECTOMY      PATIENT DENIES HAVING APPENDIX REMOVED    CHOLECYSTECTOMY      FOOT SURGERY Right        Family History   Problem Relation Age of Onset    Kidney cancer Mother     Cerebral aneurysm Father      I have reviewed and agree with the history as documented.    E-Cigarette/Vaping    E-Cigarette Use Never User      E-Cigarette/Vaping Substances    Nicotine No     THC No     CBD No     Flavoring No     Other No     Unknown No      Social History     Tobacco Use    Smoking status: Every Day     Current packs/day: 0.25     Average packs/day: 0.3 packs/day     Types: Cigarettes, Cigars     Start date: 2/5/2024    Smokeless tobacco: Never    Tobacco comments:     Pt stated \"smokes when stressed\"   Vaping Use    Vaping status: Never Used   Substance Use Topics    Alcohol use: Not Currently     Comment: occasionally    Drug use: Not Currently       Review of Systems   Psychiatric/Behavioral:  Positive for suicidal ideas.        Physical Exam  Physical Exam  Vitals and nursing note reviewed.   Constitutional:       General: She is not in acute distress.     Appearance: Normal appearance. She is obese. She is not ill-appearing, toxic-appearing or diaphoretic.   HENT:      Head: Normocephalic and atraumatic.      " Mouth/Throat:      Mouth: Mucous membranes are moist.   Eyes:      Conjunctiva/sclera: Conjunctivae normal.      Pupils: Pupils are equal, round, and reactive to light.   Cardiovascular:      Rate and Rhythm: Normal rate and regular rhythm.   Pulmonary:      Effort: Pulmonary effort is normal. No respiratory distress.      Breath sounds: Normal breath sounds. No stridor. No wheezing, rhonchi or rales.   Chest:      Chest wall: No tenderness.   Abdominal:      General: Bowel sounds are normal. There is no distension.      Palpations: Abdomen is soft.      Tenderness: There is no abdominal tenderness. There is no guarding or rebound.   Skin:     General: Skin is warm and dry.   Neurological:      General: No focal deficit present.      Mental Status: She is alert and oriented to person, place, and time. Mental status is at baseline.   Psychiatric:         Thought Content: Thought content includes suicidal ideation. Thought content does not include suicidal plan.         Vital Signs  ED Triage Vitals   Temperature Pulse Respirations Blood Pressure SpO2   02/11/24 1915 02/11/24 1916 02/11/24 1916 02/11/24 1916 02/11/24 1916   98.7 °F (37.1 °C) 98 18 132/73 93 %      Temp Source Heart Rate Source Patient Position - Orthostatic VS BP Location FiO2 (%)   02/11/24 1915 02/11/24 1916 02/11/24 1916 02/11/24 1916 --   Temporal Monitor Sitting Right arm       Pain Score       02/11/24 1916       No Pain           Vitals:    02/11/24 1916 02/11/24 2300   BP: 132/73 108/53   Pulse: 98 84   Patient Position - Orthostatic VS: Sitting Lying         Visual Acuity      ED Medications  Medications   atorvastatin (LIPITOR) tablet 10 mg (has no administration in time range)   clonazePAM (KlonoPIN) tablet 1 mg (has no administration in time range)   divalproex sodium (DEPAKOTE ER) 24 hr tablet 1,000 mg (has no administration in time range)   lisinopril (ZESTRIL) tablet 20 mg (has no administration in time range)   pantoprazole (PROTONIX)  EC tablet 20 mg (has no administration in time range)   prazosin (MINIPRESS) capsule 2 mg (has no administration in time range)   QUEtiapine (SEROquel) tablet 25 mg (has no administration in time range)   risperiDONE (RisperDAL) tablet 3 mg (has no administration in time range)   vilazodone (VIIBRYD) tablet 40 mg (has no administration in time range)   melatonin tablet 3 mg (has no administration in time range)   diphenhydrAMINE (BENADRYL) tablet 25 mg (has no administration in time range)       Diagnostic Studies  Results Reviewed       Procedure Component Value Units Date/Time    Rapid drug screen, urine [[id number]]  (Normal) Collected: 02/11/24 2036    Lab Status: Final result Specimen: Urine, Clean Catch Updated: 02/11/24 2056     Amph/Meth UR Negative     Barbiturate Ur Negative     Benzodiazepine Urine Negative     Cocaine Urine Negative     Methadone Urine Negative     Opiate Urine Negative     PCP Ur Negative     THC Urine Negative     Oxycodone Urine Negative    Narrative:      FOR MEDICAL PURPOSES ONLY.   IF CONFIRMATION NEEDED PLEASE CONTACT THE LAB WITHIN 5 DAYS.    Drug Screen Cutoff Levels:  AMPHETAMINE/METHAMPHETAMINES  1000 ng/mL  BARBITURATES     200 ng/mL  BENZODIAZEPINES     200 ng/mL  COCAINE      300 ng/mL  METHADONE      300 ng/mL  OPIATES      300 ng/mL  PHENCYCLIDINE     25 ng/mL  THC       50 ng/mL  OXYCODONE      100 ng/mL    TSH [[id number]]  (Normal) Collected: 02/11/24 1931    Lab Status: Final result Specimen: Blood from Arm, Right Updated: 02/11/24 2008     TSH 3RD GENERATON 2.768 uIU/mL     Comprehensive metabolic panel [[id number]] Collected: 02/11/24 1931    Lab Status: Final result Specimen: Blood from Arm, Right Updated: 02/11/24 1953     Sodium 135 mmol/L      Potassium 4.1 mmol/L      Chloride 98 mmol/L      CO2 30 mmol/L      ANION GAP 7 mmol/L      BUN 14 mg/dL      Creatinine 0.72 mg/dL      Glucose 116 mg/dL      Calcium 9.7 mg/dL      AST 14 U/L      ALT 15 U/L      Alkaline  Phosphatase 58 U/L      Total Protein 7.7 g/dL      Albumin 4.1 g/dL      Total Bilirubin 0.47 mg/dL      eGFR 95 ml/min/1.73sq m     Narrative:      National Kidney Disease Foundation guidelines for Chronic Kidney Disease (CKD):     Stage 1 with normal or high GFR (GFR > 90 mL/min/1.73 square meters)    Stage 2 Mild CKD (GFR = 60-89 mL/min/1.73 square meters)    Stage 3A Moderate CKD (GFR = 45-59 mL/min/1.73 square meters)    Stage 3B Moderate CKD (GFR = 30-44 mL/min/1.73 square meters)    Stage 4 Severe CKD (GFR = 15-29 mL/min/1.73 square meters)    Stage 5 End Stage CKD (GFR <15 mL/min/1.73 square meters)  Note: GFR calculation is accurate only with a steady state creatinine    POCT alcohol breath test [[id number]]  (Normal) Resulted: 02/11/24 1952    Lab Status: Final result Updated: 02/11/24 1952     EXTBreath Alcohol 0    CBC and differential [[id number]] Collected: 02/11/24 1931    Lab Status: Final result Specimen: Blood from Arm, Right Updated: 02/11/24 1936     WBC 8.24 Thousand/uL      RBC 4.15 Million/uL      Hemoglobin 12.7 g/dL      Hematocrit 38.1 %      MCV 92 fL      MCH 30.6 pg      MCHC 33.3 g/dL      RDW 14.6 %      MPV 9.3 fL      Platelets 158 Thousands/uL      nRBC 0 /100 WBCs      Neutrophils Relative 64 %      Immat GRANS % 2 %      Lymphocytes Relative 21 %      Monocytes Relative 11 %      Eosinophils Relative 2 %      Basophils Relative 0 %      Neutrophils Absolute 5.36 Thousands/µL      Immature Grans Absolute 0.12 Thousand/uL      Lymphocytes Absolute 1.70 Thousands/µL      Monocytes Absolute 0.87 Thousand/µL      Eosinophils Absolute 0.16 Thousand/µL      Basophils Absolute 0.03 Thousands/µL                    No orders to display              Procedures  ECG 12 Lead Documentation Only    Date/Time: 2/11/2024 7:57 PM    Performed by: Mara Dumont DO  Authorized by: Mara Dumont DO    Indications / Diagnosis:  Medical clearance  ECG reviewed by me, the ED Provider: yes     Patient location:  ED  Previous ECG:     Previous ECG:  Compared to current    Comparison ECG info:  2/4/2024    Similarity:  No change  Interpretation:     Interpretation: normal    Rate:     ECG rate:  98    ECG rate assessment: normal    Rhythm:     Rhythm: sinus rhythm    Ectopy:     Ectopy: none    QRS:     QRS axis:  Normal    QRS intervals:  Normal  Conduction:     Conduction: normal    ST segments:     ST segments:  Normal  T waves:     T waves: normal    Comments:      Qtc 439           ED Course  ED Course as of 02/12/24 0009   Sun Feb 11, 2024 2039 Patient evaluated by psychiatrist who recommends inpatient admission. 201 signed   2135 Patient care signed out to Dr. Arteaga. Patient is a 53 yo F who p/w SI. Medically cleared. Evaluated by psych. Recommended voluntary admission to inpatient psych.                               SBIRT 20yo+      Flowsheet Row Most Recent Value   Initial Alcohol Screen: US AUDIT-C     1. How often do you have a drink containing alcohol? 0 Filed at: 02/11/2024 1926   2. How many drinks containing alcohol do you have on a typical day you are drinking?  0 Filed at: 02/11/2024 1926   3a. Male UNDER 65: How often do you have five or more drinks on one occasion? 0 Filed at: 02/11/2024 1926   3b. FEMALE Any Age, or MALE 65+: How often do you have 4 or more drinks on one occassion? 0 Filed at: 02/11/2024 1926   Audit-C Score 0 Filed at: 02/11/2024 1926   DAT: How many times in the past year have you...    Used an illegal drug or used a prescription medication for non-medical reasons? Never Filed at: 02/11/2024 1926                      Medical Decision Making  Assessment and Plan:   Review of medical records shows that this is patient's 4th ER visit for SI since beginning of February. Due to escalating ER visits, will get psych consult for recommendations on plan of care. Patient's left forearm scabs are well healing. No surrounding erythema of concern for infection at this time.      Additionally, patient has a past medical history significant for bipolar disorder, depression, anxiety, schizoaffective disorder, borderline personality, and cognitive impairment.     Amount and/or Complexity of Data Reviewed  Labs: ordered.    Risk  OTC drugs.  Prescription drug management.  Decision regarding hospitalization.             Disposition  Final diagnoses:   Suicidal ideation     Time reflects when diagnosis was documented in both MDM as applicable and the Disposition within this note       Time User Action Codes Description Comment    2/11/2024  7:10 PM Mara Dumont Add [R45.851] Suicidal ideation           ED Disposition       ED Disposition   Transfer to Behavioral Health Condition   --    Date/Time   Sun Feb 11, 2024 1956    Comment   Leydi Khan has been medically cleared and is pending crisis eval.               Follow-up Information    None         Patient's Medications   Discharge Prescriptions    No medications on file       No discharge procedures on file.    PDMP Review         Value Time User    PDMP Reviewed  Yes 11/15/2023 10:53 AM SHANI Hawk            ED Provider  Electronically Signed by             Mara Dumont DO  02/12/24 0009

## 2024-02-12 NOTE — ED NOTES
[place]    Recipient ID: [id number]    Primary: Medicare A&B   ID:  [id number]    Secondary: [facility]  ID: [id number]

## 2024-02-12 NOTE — ED NOTES
Patient is accepted at [facility]  Patient is accepted by Dr. Gabrielle Zee    Transportation is arranged with [facility].     Transportation is scheduled for 13:00 CTS.   Patient may go to the floor at anytime         Nurse report is to be called to [phone] prior to patient transfer.

## 2024-02-12 NOTE — ED NOTES
Called [facility] ([phone]) to obtain the prior auth.  Had to leave a voice mail and awaiting a return call.

## 2024-02-12 NOTE — H&P
Leydi Khan  :1970 F  MRN:[id number]    [facility]:[id number]  Adm Date: 2024  2:16 PM   ATT PHY: Renato Mcmanus Md  [facility]         Chief Complaint:   Worsening depression and anxiety    History of Presenting Illness: Leydi Khan is a(n) 54 y.o. year old female was admitted through ED because of worsening depression symptoms along with suicidal ideations.  Patient has history of PTSD with flashbacks of miscarriage.    Patient examined at bedside.  Patient denied any active suicidal homicidal ideations.    Allergies   Allergen Reactions    Fish Oil - Food Allergy Other (See Comments)     unknown    Fish-Derived Products - Food Allergy Hives       Current Facility-Administered Medications on File Prior to Encounter   Medication Dose Route Frequency Provider Last Rate Last Admin    [COMPLETED] acetaminophen (TYLENOL) tablet 975 mg  975 mg Oral Once Oliverarias Daley, DO   975 mg at 24 1204    [DISCONTINUED] atorvastatin (LIPITOR) tablet 10 mg  10 mg Oral Daily With Dinner Mara Dumont DO        [DISCONTINUED] atorvastatin (LIPITOR) tablet 10 mg  10 mg Oral HS Mara Dumont, DO   10 mg at 24 0156    [DISCONTINUED] clonazePAM (KlonoPIN) tablet 1 mg  1 mg Oral BID PRN Mara Dumont DO        [DISCONTINUED] Desvenlafaxine Succinate ER TB24 25 mg  25 mg Oral Daily Mara Elamberg, DO   25 mg at 24 0905    [DISCONTINUED] diphenhydrAMINE (BENADRYL) tablet 25 mg  25 mg Oral BID PRN Mara Dumont DO        [DISCONTINUED] divalproex sodium (DEPAKOTE) DR tablet 1,000 mg  1,000 mg Oral HS Mara Dumont, DO   1,000 mg at 24 0155    [DISCONTINUED] lisinopril (ZESTRIL) tablet 20 mg  20 mg Oral Daily Mara Elamberg, DO   20 mg at 24 0905    [DISCONTINUED] melatonin tablet 3 mg  3 mg Oral HS Mara Dumont, DO   3 mg at 24 0156    [DISCONTINUED] pantoprazole (PROTONIX) EC tablet 20 mg  20 mg Oral Early  Morning Mara Julia, DO   20 mg at 02/12/24 0904    [DISCONTINUED] prazosin (MINIPRESS) capsule 2 mg  2 mg Oral HS Mara Dumont, DO   2 mg at 02/12/24 0155    [DISCONTINUED] QUEtiapine (SEROquel) tablet 25 mg  25 mg Oral HS Maraevelyn ElamJulia, DO   25 mg at 02/12/24 0155    [DISCONTINUED] risperiDONE (RisperDAL) tablet 3 mg  3 mg Oral BID Mara Elamberg, DO   3 mg at 02/12/24 0904    [DISCONTINUED] vilazodone (VIIBRYD) tablet 40 mg  40 mg Oral Daily Mara Julia, DO   40 mg at 02/12/24 0905     Current Outpatient Medications on File Prior to Encounter   Medication Sig Dispense Refill    acetaminophen (TYLENOL) 325 mg tablet Take 2 tablets (650 mg total) by mouth every 4 (four) hours as needed for moderate pain 90 tablet 0    atorvastatin (LIPITOR) 10 mg tablet Take 10 mg by mouth daily at bedtime      clonazePAM (KlonoPIN) 1 mg tablet Take 1 mg by mouth 2 (two) times a day      desvenlafaxine succinate (PRISTIQ) 50 mg 24 hr tablet Take 25 mg by mouth daily      diphenhydrAMINE (BENADRYL) 25 mg capsule Take 25 mg by mouth 2 (two) times a day as needed for itching      divalproex sodium (DEPAKOTE) 500 mg DR tablet Take 1,000 mg by mouth daily at bedtime      lisinopril (ZESTRIL) 20 mg tablet Take 20 mg by mouth daily      melatonin 3 mg Take 1 tablet (3 mg total) by mouth daily at bedtime 30 tablet 0    pantoprazole (PROTONIX) 20 mg tablet Take 1 tablet (20 mg total) by mouth daily 30 tablet 0    QUEtiapine (SEROquel) 25 mg tablet Take 25 mg by mouth daily at bedtime      risperiDONE (RisperDAL) 3 mg tablet Take 3 mg by mouth 2 (two) times a day      vilazodone (VIIBRYD) 40 mg tablet Take 40 mg by mouth daily with breakfast      prazosin (MINIPRESS) 2 mg capsule Take 1 capsule (2 mg total) by mouth daily at bedtime 30 capsule 0    [DISCONTINUED] ammonium lactate (LAC-HYDRIN) 12 % lotion Apply topically 2 (two) times a day (Patient not taking: Reported on 2/11/2024) 500 g 0    [DISCONTINUED]  cholecalciferol (VITAMIN D3) 1,000 units tablet Take 1 tablet (1,000 Units total) by mouth daily Do not start before January 6, 2024. (Patient not taking: Reported on 2/11/2024) 30 tablet 0    [DISCONTINUED] Cyanocobalamin (Vitamin B 12) 500 MCG TABS Take 1,000 mcg by mouth in the morning (Patient not taking: Reported on 2/11/2024) 30 tablet 0    [DISCONTINUED] Diclofenac Sodium (VOLTAREN) 1 % Apply 4 g topically 4 (four) times a day as needed (4 times daily PRN) (Patient not taking: Reported on 2/11/2024) 350 g 0    [DISCONTINUED] divalproex sodium (DEPAKOTE ER) 500 mg 24 hr tablet Take 2 tablets (1,000 mg total) by mouth daily at bedtime 60 tablet 0    [DISCONTINUED] ergocalciferol (VITAMIN D2) 50,000 units Take 1 capsule (50,000 Units total) by mouth once a week for 8 doses Do not start before November 18, 2023. 4 capsule 0    [DISCONTINUED] FLUoxetine (PROzac) 20 mg capsule Take 3 capsules (60 mg total) by mouth daily Do not start before November 15, 2023. (Patient not taking: Reported on 2/11/2024) 90 capsule 0    [DISCONTINUED] meclizine (ANTIVERT) 25 mg tablet Take 1 tablet (25 mg total) by mouth 2 (two) times a day (Patient not taking: Reported on 2/11/2024) 30 tablet 0    [DISCONTINUED] nicotine polacrilex (NICORETTE) 4 mg gum Chew 1 each (4 mg total) every 2 (two) hours as needed for smoking cessation (Patient not taking: Reported on 2/11/2024) 100 each 0    [DISCONTINUED] nystatin (MYCOSTATIN) powder Apply topically 2 (two) times a day as needed (As needed) (Patient not taking: Reported on 2/11/2024) 60 g 0    [DISCONTINUED] risperiDONE (RisperDAL) 2 mg tablet Take 1 tablet (2 mg total) by mouth daily Do not start before November 15, 2023. (Patient not taking: Reported on 2/11/2024) 30 tablet 0    [DISCONTINUED] risperiDONE (RisperDAL) 3 mg tablet Take 1 tablet (3 mg total) by mouth daily at bedtime 30 tablet 0    [DISCONTINUED] senna-docusate sodium (SENOKOT S) 8.6-50 mg per tablet Take 2 tablets by  mouth daily at bedtime 60 tablet 0    [DISCONTINUED] traZODone (DESYREL) 100 mg tablet Take 1 tablet (100 mg total) by mouth daily at bedtime 30 tablet 0       Active Ambulatory Problems     Diagnosis Date Noted    Primary hypertension 08/09/2016    Hyperlipidemia 08/09/2016    Class 3 severe obesity due to excess calories without serious comorbidity in adult ([facility]) 01/09/2021    Chronic midline low back pain without sciatica 01/09/2021    PTSD (post-traumatic stress disorder) 01/09/2021    Borderline personality disorder ([facility])     Seizures ([facility]) 04/27/2022    Anemia 11/03/2023     Resolved Ambulatory Problems     Diagnosis Date Noted    Medical clearance for psychiatric admission 01/09/2021    Major depression with psychotic features ([facility]) 11/29/2020    Bacterial conjunctivitis of both eyes 01/09/2021    Vertigo 01/22/2021    Bipolar affective disorder, depressed, severe, with psychotic behavior ([facility]) 10/10/2021    Closed fracture of base of fifth metatarsal bone 11/14/2021    Seizures ([facility]) 12/06/2021    Left leg pain 12/18/2021    Medical clearance for psychiatric admission 04/27/2022    Hyponatremia 04/27/2022    Insomnia 05/06/2022    Constipation 05/06/2022    History of non-suicidal self-harm 07/29/2022    Acute cystitis without hematuria 10/07/2022     Past Medical History:   Diagnosis Date    Anxiety     Bipolar 1 disorder ([facility])     Bipolar affective disorder, depressed, severe ([facility]) 10/10/2021    CAD (coronary artery disease)     Cognitive impairment     Depression     Dyslipidemia     GERD (gastroesophageal reflux disease)     Hypertension     Obesity     Psychiatric disorder     Psychiatric illness     Schizoaffective disorder ([facility])     Seizure ([facility])        Past Surgical History:   Procedure Laterality Date    APPENDECTOMY      PATIENT DENIES HAVING APPENDIX REMOVED    CHOLECYSTECTOMY      FOOT SURGERY Right        Social History:   Social History     Socioeconomic History    Marital status: Single      "Spouse name: None    Number of children: None    Years of education: None    Highest education level: None   Occupational History    None   Tobacco Use    Smoking status: Every Day     Current packs/day: 0.25     Average packs/day: 0.3 packs/day     Types: Cigarettes, Cigars     Start date: 2/5/2024    Smokeless tobacco: Never    Tobacco comments:     Pt stated \"smokes when stressed\"   Vaping Use    Vaping status: Never Used   Substance and Sexual Activity    Alcohol use: Not Currently     Comment: occasionally    Drug use: Not Currently    Sexual activity: None   Other Topics Concern    None   Social History Narrative    None     Social Determinants of Health     Financial Resource Strain: Not on file   Food Insecurity: No Food Insecurity (7/25/2021)    Received from [facility]    Hunger Vital Sign     Worried About Running Out of Food in the Last Year: Never true     Ran Out of Food in the Last Year: Never true   Transportation Needs: Not on file   Physical Activity: Not on file   Stress: Not on file   Social Connections: Socially Isolated (7/26/2021)    Received from [facility]    Social Connection and Isolation Panel [NHANES]     Frequency of Communication with Friends and Family: More than three times a week     Frequency of Social Gatherings with Friends and Family: Never     Attends Restorationist Services: Never     Active Member of Clubs or Organizations: No     Attends Club or Organization Meetings: Never     Marital Status: Never    Intimate Partner Violence: Unknown (4/3/2021)    Received from [facility]    Intimate Partner Violence     Within the last year, have you been afraid of your partner, ex-partner or family member?: Not on file     Within the last year, have you been humiliated or emotionally abused in other ways by your partner, ex-partner or family member?: Not on file     Within the last year, have you been kicked, hit, slapped, or otherwise " physically hurt by your partner, ex-partner or family member?: Not on file     Within the last year, have you been raped or forced to have any kind of sexual activity by your partner, ex-partner or family member?: Not on file   Housing Stability: Not on file       Family History:   Family History   Problem Relation Age of Onset    Kidney cancer Mother     Cerebral aneurysm Father        Review of Systems   Gastrointestinal:  Positive for diarrhea.   Musculoskeletal:  Positive for arthralgias and back pain.   Neurological:  Positive for headaches.   Psychiatric/Behavioral:  Positive for sleep disturbance.    All other systems reviewed and are negative.      Physical Exam   Vitals: Blood pressure 131/74, pulse 88, temperature 98.5 °F (36.9 °C), temperature source Temporal, resp. rate 18, weight 123 kg (271 lb 9.6 oz), SpO2 95%.,Body mass index is 43.84 kg/m².  Constitutional: Awake and Alert. Well-developed and well-nourished. No distress.   HENT: PERR,EOMI, conjunctiva normal  Head: Normocephalic and atraumatic.   Mouth/Throat: Oropharynx is clear and moist.    Eyes: Conjunctivae and EOM are normal. Pupils are equal, round, and reactive to light. Right and left eye exhibits no discharge.  Neck: Neck supple. No tracheal deviation present. No thyromegaly present.   Cardiovascular: Normal rate, regular rhythm and normal heart sounds.  Exam reveals no friction rub. No murmur heard.  Pulmonary/Chest: Effort normal and breath sounds normal. No respiratory distress. She has no wheezes.   Abdominal: Soft. Bowel sounds are normal. She exhibits no distension. There is no tenderness. There is no rebound and no guarding.   Neurological: Cranial Nerves grossly intact. No sensory deficit. Coordination normal.   Musculoskeletal:   Nontender spine  Skin: Skin is warm and dry. No rash noted. No diaphoresis. No erythema. No edema. No cyanosis.    Assessment     Leydi Khan is a(n) 54 y.o. year old female with MDD with suicidal  ideations    1. Cardiac with history of hypertension and dyslipidemia.  Patient is on lisinopril 20 mg daily and atorvastatin 10 mg daily.  2. Migraine headaches.  Patient may get Tylenol on as-needed basis.  3.  Tobacco abuse.  Patient refused nicotine patch but may get nicotine gum as needed.  4. Seizure disorder.  Patient is on Depakote ER 1000 mg at bedtime.  5. DJD/osteoarthritis with lower back pain.  Patient may continue to get Tylenol p.r.n.  6.  GERD.  Patient is on Protonix 20 mg daily.  7. Psych with MDD.  Patient is being managed by psych.       Prognosis: Fair.     Discharge Plan: In progress.     Advanced Directives: I have discussed in detail the patient the advanced directives.  Patient has not appointed anybody as her POA and has no living will with advanced healthcare directives.  Patient's 1st contact is her aunt Sandhya Collins and her phone number is [phone]. When discussing cardiac and pulmonary resuscitation efforts with the patient, the patient wishes to be FULL CODE.     I have spent more than 50 minutes gathering data, doing physical examination, and discussing the advanced directives, which was witnessed by caring staff.

## 2024-02-12 NOTE — ED NOTES
Please call [phone] for pick-up and they stated that they don't have staff to send with her     Mariia Esteves RN  02/11/24 [id number]

## 2024-02-12 NOTE — ED NOTES
PC to [facility]. [facility] was prepared for the denial. [facility] did indicate that the Pt was just there within the last 30 days so she was more than aware that coverage would be moot.

## 2024-02-12 NOTE — ED NOTES
Pt ambulated to bathroom with standby assist of this RN. Pt assisted back to stretcher after bathroom and provided with additional blanket.     Muriel Kunz RN  02/12/24 [id number]

## 2024-02-12 NOTE — ED NOTES
[facility] that at this point the Pt will need to go to a hospital based program based on exhaustion of Medicare days. [facility] stated [facility] is not an option.

## 2024-02-12 NOTE — ED NOTES
"[facility] provider, Dr Delaney, reported recommendation of 201 along with \"lithium or clozapine or maybe even ECT.\"    Conversation to be had on 2/12/24 regarding pt receptiveness to ECT.    "
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
## 2024-02-12 NOTE — NURSING NOTE
Endocrine Consult note    Attending Assessment/Plan :     Thyrotoxicosis. Differential includes transient thyroiditis or hyperthyroidism.  Given her age, long standing low normal TSH, goiter   and past CT contrast exposure we also need to consider possible iodine induced hyperthyroidism in the setting of thyroid autonomy    The differential could be proven with a 123I thyroid scan.  If we could get this, dose for this today, it might be worthwhile to hold therapy to get this test.  It is a 2 day test and it would have to start today.  If we can't get it today and tomorrow, I would start treatment as indicated below   I have already added on labs to existing specimens: total T3, total T4, TSI, CRP inflammation.   Given her cardiac status, would not wait for results to start treatment: Start methimazole -20 mg loading dose and then 10 mg three times/day.  Avoid CT contrast if you can     Goiter is noted on exam.  As per # 1     Hypercalcemia can be due to # 1     Cardiac-decompensated heart failure, history of a fib- this makes the treatment of # 1 more important.     Discussed with [place] [address] team via telephone.     June Acuña MD    Chief complaint:  Isadora is a 89 year old female seen in consultation at the request of Dr Dina Oakes for hyperthyroidism  I have reviewed Care Everywhere including [facility], [facility], [facility]  lab reports, imaging reports and provider notes as indicated.      HISTORY OF PRESENT ILLNESS   I saw Isadora along with her daughter Aida  today.   Isadora was admitted 5/24/23 after being seen in clinic with complaints of dizziness, nausea and vomiting.  Symptoms have been present at some level since February, starting with her vomiting at a doctors appt for her .  She had had some nausea since then. She has been found to have gall stones.      She was hospitalized 10/20/22 with CP, paroxysmal A fib, SSS s/p PPM insetion on 10/17/22.  I can see there was discussion of  Patient told me that she wanted to hurt herself and was feeling suicidal. Checked with Charge Nurse and she said to have her sit by Nurse's station so we can keep an eye on her. Continued care and 7 minute safety checks in progress.   amiodarone initiation in March 2023 but I am unable to document it was ever given.  She had previously declined anticoagulation as well .     She had COVID in Oct or Nov 2022, took Paxlovid.  She doesn't recall other recent viral illnesses.  She cites travel about 1-2 weeks ago.  She denies thyroid tenderness.     Endocrine relevant labs are as follows:  8/6/07 TSH 0.73  8/18/10 TSH 1.13  8/18/11 TSH 0.464  5/15/12 TSh 0.39  8/21/12 TSH 0.95  1/15/16 TSH 0.448  4/6/23 CT contrast  5/24/23 TSH < 0.01, free T4 > 7.77, BNP 2414, troponin 38, glucose 122, Ca 9.9, creatinine 0.89, WBC 7000, platelets 146K  5/25/23 TSH < 0.01, T3 420, free t4 7.71, T4 16.9, CRP 9.1, Ca 9.8    Relevant current meds  Metoprolol 50 mg  /day    Relevant imaging is as follows: (as read by me as it pertains to endocrine relevant organs)  There is no thyroid imaging  4/6/2023 CT abdomen and pelvis: adrenals appear normal  5/21/2023 CXR: small pleural effusion; no obvious evidence of large goiter     5/25/23 EKg NSR HR 72/minute     REVIEW OF SYSTEMS  More cold than heat intolerance  Isn't noticing heart much - except once when she forgot metoprolol  No MCKNIGHT   Diarrhea with urgency has been occurring lately  No tremor  10 system ROS otherwise as per the HPI or negative    Past Medical History  Past Medical History:   Diagnosis Date     Atrial fibrillation (H) 01/01/2011     Cataract      Closed nondisplaced fracture of styloid process of radius      Dry eyes      Facial fracture (H) 01/01/2006     Hypertension      Low bone density      NSVT (nonsustained ventricular tachycardia) (H) 01/01/2009    during exercise echo, neg EP study     Pacemaker 07/01/2010     Postmenopausal status      S/P cardiac catheterization 01/01/2009    30-40% non obstructive lesion     SSS (sick sinus syndrome) (H) 10/2022     Ventricular tachycardia, nonsustained (H) 01/01/2009    during stress echo       Past Surgical History:   Procedure Laterality Date     CATARACT  EXTRACTION W/ INTRAOCULAR LENS IMPLANT Right 12/04/2018     IMPLANT PACEMAKER       PPM  06/30/2010    Permanent Pacemaker     Medications  Current Facility-Administered Medications   Medication     acetaminophen (TYLENOL) tablet 650 mg    Or     acetaminophen (TYLENOL) Suppository 650 mg     famotidine (PEPCID) tablet 20 mg     lidocaine (LMX4) cream     lidocaine 1 % 0.1-1 mL     lisinopril-hydrochlorothiazide (ZESTORETIC) 10-12.5 MG per tablet 2 tablet     melatonin tablet 1 mg     metoprolol succinate ER (TOPROL XL) 24 hr tablet 100 mg     metoprolol succinate ER (TOPROL XL) 24 hr tablet 50 mg     polyethylene glycol (MIRALAX) Packet 17 g     senna-docusate (SENOKOT-S/PERICOLACE) 8.6-50 MG per tablet 1 tablet    Or     senna-docusate (SENOKOT-S/PERICOLACE) 8.6-50 MG per tablet 2 tablet     sodium chloride (PF) 0.9% PF flush 3 mL     sodium chloride (PF) 0.9% PF flush 3 mL     Vitamin D3 (CHOLECALCIFEROL) tablet 25 mcg       Allergies  No Known Allergies    Family History  family history includes Arrhythmia in her brother, brother, and sister; Atrial fibrillation in her son; Blood Disease in her son; Breast Cancer (age of onset: 50) in her daughter; Cardiovascular in her paternal grandfather; Cardiovascular (age of onset: 76) in her father; Colon Cancer in her sister; Coronary Artery Disease in her father and paternal grandfather; Diabetes Type 2  in her maternal grandfather; Hypertension in her mother; Leukemia in her maternal grandfather; Macular Degeneration in her mother; Myocardial Infarction (age of onset: 70) in her father; Myocardial Infarction (age of onset: 88) in her mother; Neuropathy in her sister; Uterine Cancer in her daughter.    Social History  Social History     Tobacco Use     Smoking status: Never     Smokeless tobacco: Never   Substance Use Topics     Alcohol use: No     Drug use: No     ;     Physical Exam  /46 (BP Location: Right arm, Patient Position: Semi-Ramírez's, Cuff Size:  Adult Regular)   Pulse 68   Temp 97.9  F (36.6  C) (Oral)   Resp 18   SpO2 96%   There is no height or weight on file to calculate BMI.  GENERAL : elderly woman   In no apparent distress; she was walking with PT/ walker when I met her on the joiner.  She has somewhat stooped stance, moves slowly  SKIN: Normal color, normal temperature, texture.   EYES: PER,  NECK: No visible masses. + palpable goiter firm, I can only feel upper extent with swallow.  Size estimated at maybe 2 x normal.   RESP: Lungs clear to auscultation bilaterally  CARDIAC: hyperdynamic,  Normal /slow rate S1 S2, without murmurs, rubs or gallops     NEURO: awake, alert, responds appropriately to questions.  Speech is a lttle hard for me to understand at times.    Moves all extremities; Gait normal.  No tremor of the outstretched hand.,   EXTREMITIES: sock indentation edema.      DATA REVIEW     Latest Ref Rng 5/15/2012  9:14 AM 8/21/2012  7:44 AM 5/24/2023  6:52 PM 5/25/2023  6:35 AM   ENDO THYROID LABS-[facility]        TSH 0.4 - 5.0 mU/L 0.39 (L)  0.95      TSH 0.30 - 4.20 uIU/mL   <0.01 (L)  <0.01 (L)    FREE T4 0.90 - 1.70 ng/dL   >7.77 (H)        Legend:  (L) Low  (H) High
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
## 2024-02-12 NOTE — ED NOTES
Pt presented to ED with suicidal ideation due to PTSD flashbacks of miscarriage.  Pt reported wanting to kill herself with a fork.  Pt does not have access to guns.  Pt reported no new medical issues.  Pt denies legal issues.  PT reported no substance use.  Pt reported 1-2 cigarettes a day.  Pt denies homicidal ideation.  Pt reported command auditory hallucinations.  Psychiatry recommending inpatient.

## 2024-02-12 NOTE — ED NOTES
Insurance Authorization for admission:   Phone call placed to [facility].  Phone number: [phone].     Spoke to Ana.     03 days approved.  Level of care: inpatient mental health.  Review on 2/15/24   Authorization # upon admission

## 2024-02-12 NOTE — ED NOTES
Pt Behavioral Health High Utilizer Plan     Treatment Recommendations & Presentation:  Presentation in the ED: Pt will typically call 911 for EMS/police to bring her to ED's or is accompanied by her group home staff. During ED visits, pt may state that no one cares about her or loves her or that she just had a fight with her boyfriend and that he doesn't love her or they broke up. SI's with no plan or SI's with a plan to cut her wrists or throat, jump or walk in front of a car or traffic, hang herself, drink bleach or kill herself with a thumbtack. Pt also reports Auditory Hallucinations.   Pt has superficially scratched or poked herself with a thumbtack on multiple occasions and has also utilized a plastic fork to scratch herself. Pt also states she does not like living at [facility] group [facility] and [facility] Medically, pt has reported back pain, chest pain, pain from falling, flank & body pain, UTI symptoms.    Pt will call 911 on herself in order for EMS to bring her to ED's and tends to do this at certain times when group home staffing is more limited. If pt's boyfriend is being hospitalized then pt also wants hospitalization. Pt may seek hospitalization because she spent her allowance and is frustrated.     ED Recommendations:   Following medical evaluation and treatment, allow pt time for de-escalation and for provider to establish acute risk versus pt's chronic behavioral disturbances.  Contact [facility] to develop a safe discharge plan back to longterm [phone]. Specific contacts are: Letitia Lucia - Supervisor, Care Coordinator [phone] or Fabio - Care Coordinator [phone] or Supervisor BRITT Betts - [phone]. [phone], or Therapist Daan Jarquin at [phone] ext [phone].

## 2024-02-12 NOTE — ED NOTES
Insurance Authorization for admission:   Phone call placed to [facility]  Phone number: [phone]  Level of care: voluntary inpatient mental health  Voicemail left.        Eligibility Verification System checked - [phone]).  Online system / automated system indicates: active    Pt has exhausted Medicare Days

## 2024-02-13 PROBLEM — F33.3 MDD (MAJOR DEPRESSIVE DISORDER), RECURRENT, SEVERE, WITH PSYCHOSIS (HCC): Status: ACTIVE | Noted: 2024-02-13

## 2024-02-13 PROCEDURE — 99223 1ST HOSP IP/OBS HIGH 75: CPT | Performed by: PSYCHIATRY & NEUROLOGY

## 2024-02-13 RX ORDER — AMMONIUM LACTATE 12 G/100G
LOTION TOPICAL 2 TIMES DAILY
Status: DISCONTINUED | OUTPATIENT
Start: 2024-02-13 | End: 2024-02-22 | Stop reason: HOSPADM

## 2024-02-13 RX ORDER — TRAZODONE HYDROCHLORIDE 50 MG/1
50 TABLET ORAL
Status: DISCONTINUED | OUTPATIENT
Start: 2024-02-13 | End: 2024-02-22 | Stop reason: HOSPADM

## 2024-02-13 RX ORDER — DESVENLAFAXINE SUCCINATE 50 MG/1
50 TABLET, EXTENDED RELEASE ORAL DAILY
Status: DISCONTINUED | OUTPATIENT
Start: 2024-02-13 | End: 2024-02-14

## 2024-02-13 RX ORDER — ERGOCALCIFEROL 1.25 MG/1
50000 CAPSULE ORAL WEEKLY
Status: DISCONTINUED | OUTPATIENT
Start: 2024-02-13 | End: 2024-02-22 | Stop reason: HOSPADM

## 2024-02-13 RX ORDER — MELATONIN
1000 DAILY
Status: DISCONTINUED | OUTPATIENT
Start: 2024-04-30 | End: 2024-02-22 | Stop reason: HOSPADM

## 2024-02-13 RX ADMIN — PANTOPRAZOLE SODIUM 20 MG: 20 TABLET, DELAYED RELEASE ORAL at 08:13

## 2024-02-13 RX ADMIN — TRAZODONE HYDROCHLORIDE 50 MG: 50 TABLET ORAL at 20:55

## 2024-02-13 RX ADMIN — Medication: at 20:58

## 2024-02-13 RX ADMIN — RISPERIDONE 3 MG: 1 TABLET ORAL at 20:54

## 2024-02-13 RX ADMIN — Medication 3 MG: at 20:53

## 2024-02-13 RX ADMIN — DIVALPROEX SODIUM 1000 MG: 500 TABLET, DELAYED RELEASE ORAL at 20:52

## 2024-02-13 RX ADMIN — RISPERIDONE 3 MG: 1 TABLET ORAL at 08:09

## 2024-02-13 RX ADMIN — ERGOCALCIFEROL 50000 UNITS: 1.25 CAPSULE, LIQUID FILLED ORAL at 16:16

## 2024-02-13 RX ADMIN — ATORVASTATIN CALCIUM 10 MG: 10 TABLET, FILM COATED ORAL at 20:54

## 2024-02-13 RX ADMIN — LORAZEPAM 1 MG: 1 TABLET ORAL at 16:19

## 2024-02-13 RX ADMIN — PRAZOSIN HYDROCHLORIDE 2 MG: 1 CAPSULE ORAL at 20:53

## 2024-02-13 RX ADMIN — LISINOPRIL 20 MG: 20 TABLET ORAL at 08:09

## 2024-02-13 RX ADMIN — DESVENLAFAXINE 50 MG: 50 TABLET, FILM COATED, EXTENDED RELEASE ORAL at 11:55

## 2024-02-13 NOTE — PLAN OF CARE
Problem: DISCHARGE PLANNING - CARE MANAGEMENT  Goal: Discharge to post-acute care or home with appropriate resources  Description: INTERVENTIONS:  - Conduct assessment to determine patient/family and health care team treatment goals, and need for post-acute services based on payer coverage, community resources, and patient preferences, and barriers to discharge  - Address psychosocial, clinical, and financial barriers to discharge as identified in assessment in conjunction with the patient/family and health care team  - Arrange appropriate level of post-acute services according to patient’s   needs and preference and payer coverage in collaboration with the physician and health care team  - Communicate with and update the patient/family, physician, and health care team regarding progress on the discharge plan  - Arrange appropriate transportation to post-acute venues  Outcome: Progressing     New 201, goal initiated.

## 2024-02-13 NOTE — NURSING NOTE
Patient observed sleeping during shift. Remained at nurse's station sitting in recliner.  No acute behaviors or suicidal ideations overnight.  No change in medical condition overnight.  Maintained on q 7 minute safety checks. Will continue to monitor.

## 2024-02-13 NOTE — NURSING NOTE
"Patient tearful, increased respiratory rate noted as she expressed being very depressed/anxious. PRN Ativan 1 mg PO given for Smith score of 26. Remains on 7\" checks for safety and behaviors.  "

## 2024-02-13 NOTE — PROGRESS NOTES
02/13/24 1100   Activity/Group Checklist   Group Admission/Discharge  (Self Assessment)   Attendance Attended   Attendance Duration (min) 16-30   Interactions Interacted appropriately   Affect/Mood Appropriate;Blunted/flat   Goals Achieved Identified feelings;Identified triggers;Discussed coping strategies;Able to listen to others;Able to engage in interactions;Able to reflect/comment on own behavior;Able to manage/cope with feelings;Able to self-disclose;Able to recieve feedback     Patient agreeable to meet and complete self assessment with CTRS.  Patient shared that she is here due to relationship problems and lead to her have SI and self harm cutting.  She shared she likes music, art, board games, word search, coloring, watch tv/movies and socializing with peers.

## 2024-02-13 NOTE — PROGRESS NOTES
"Progress Note - Leydi Khan 54 y.o. female MRN: [id number]    Unit/Bed#: [facility] 203-01 Encounter: [id number]        Subjective:   Patient seen and examined at bedside after reviewing the chart and discussing the case with the caring staff.      Patient examined at bedside.  Patient complaining of dry cracked skin on her bilateral heel and wants something for it.    On review of patient's labs it was found that patient's vitamin D level is 15.2.  Patient's vitamin B12 level is still pending.    Physical Exam   Vitals: Blood pressure 137/65, pulse 89, temperature 98.1 °F (36.7 °C), temperature source Temporal, resp. rate 18, height 5' 6\" (1.676 m), weight 123 kg (271 lb), SpO2 95%.,Body mass index is 43.74 kg/m².  Constitutional: He appears well-developed.   HEENT: PERR, EOMI, MMM  Cardiovascular: Normal rate and regular rhythm.    Pulmonary/Chest: Effort normal and breath sounds normal.   Abdomen: Soft, + BS, NT    Assessment/Plan:  Leydi Khan is a(n) 54 y.o. year old female with MDD with suicidal ideations     1. Cardiac with history of hypertension and dyslipidemia.  Patient is on lisinopril 20 mg daily and atorvastatin 10 mg daily.  2. Migraine headaches.  Patient may get Tylenol on as-needed basis.  3.  Tobacco abuse.  Patient refused nicotine patch but may get nicotine gum as needed.  4. Seizure disorder.  Patient is on Depakote ER 1000 mg at bedtime.  5. DJD/osteoarthritis with lower back pain.  Patient may continue to get Tylenol p.r.n.  6.  GERD.  Patient is on Protonix 20 mg daily.  7.  Dry cracked skin bilateral heel.  Patient may get ammonium lactate 2 times daily.  8.  New vitamin D deficiency.  I will put the patient on vitamin D bolus doses for 12 weeks followed by vitamin D3 1000 units daily.  "

## 2024-02-13 NOTE — PROGRESS NOTES
02/13/24 0900   Team Meeting   Meeting Type Daily Rounds   Team Members Present   Team Members Present Physician;Nurse;;Occupational Therapist   Physician Team Member Dr. Marietta MD; SHANI Rubio   Nursing Team Member Lety Olmedo, JOSÉ MIGUEL   Care Management Team Member Elif Laws, MS, NCC, LPC   OT Team Member Selma Arrington, MARIS   Patient/Family Present   Patient Present No   Patient's Family Present No   201 new admission. From [facility], high depression and low anxiety. No vh. Reviewed psych and medical hx. Came in due to self harm scratching self.

## 2024-02-13 NOTE — NURSING NOTE
"Patient visible in milieu, pleasant and cooperative in interaction. Social with staff and peers. Patient denies anxiety, endorses high depression. Patient with fleeting SI/wanting to harm self by punching self in head. Patient with auditory hallucinations telling her to harm herself. Patient stated she would come to staff if she was to harm self. Currently sitting by staff, coloring. Attended groups as scheduled. Remains medication compliant and on 7\" checks for safety and behaviors.  "

## 2024-02-13 NOTE — NURSING NOTE
"Patient friendly and upbeat during assessment.  Denied anxiety. Stated she is severally depressed.  Talked about getting ECT done.  Stated she felt unsafe being in her room and wished to sit at Nursing Station in recliner, (till she feels safe and then \"will go to her room\").  Complaint with medications and snack.  Maintained on q 7 minute safety checks.  Social to select peers.  Intrusive and needy at times. Will continue to monitor.  "

## 2024-02-13 NOTE — NUTRITION
02/13/24 1314   Biochemical Data,Medical Tests, and Procedures   Biochemical Data/Medical Tests/Procedures Lab values reviewed;Meds reviewed   Labs (Comment) 2/11 CMP & CBC WNL, Vit D:15.2   Meds (Comment) lipitor, cogentin, depakote, atarax, zestril, ativan, melatonin, zyprexa, protonix, minipress, seroquel, desyrel   Nutrition-Focused Physical Exam   Nutrition-Focused Physical Exam Findings RN skin assessment reviewed  (Wound left arm per documentation)   Nutrition-Focused Physical Exam Findings +1 BLE edema. LBM 2/12.   Medical-Related Concerns Anxiety     Bipolar 1 disorder (HCC)     Bipolar affective disorder, depressed, severe (HCC) 10/10/2021    Borderline personality disorder (HCC)     CAD (coronary artery disease)     Cognitive impairment     Depression     Dyslipidemia     GERD (gastroesophageal reflux disease)     Hypertension     Obesity     Psychiatric disorder     Psychiatric illness     PTSD (post-traumatic stress disorder)     Schizoaffective disorder (HCC)     Seizure (HCC)   Adequacy of Intake   Nutrition Modality PO   Feeding Route   PO Independent   Current PO Intake   Current Diet Order Regular diet thin liquids   Current Meal Intake %   Estimated calorie intake compared to estimated need Nutrient needs met.   PES Statement   Problem Clinical   Weight (3) Overweight/obesity NC-3.3   Overweight/ obesity specific to Obese, Class III (5)   Related to Energy intake>energy output over time   As evidenced by: BMI   Recommendations/Interventions   Malnutrition/BMI Present Yes  (energy intake > energy output over time.)   Adult BMI Classifications Morbid Obesity 40-44.9   Summary High BMI. Regular diet thin liquids. Meal completions 100%. Patient known to this writer from previous admission. Per notes is from a group home.  Appetite is adequate. Provided 3 meals and snacks daily at her facility. No diet plan. During previous admission patient requested double portions; double portions remains  inappropriate at this time. 2/12/#; 11/3/#; 9/29/#; 4/10/#, 44#(19%) weight gain; 12/12/#. Weight appears to fluctuate, will monitor weight status. Wound left arm per documentation. +1 BLE edema. LBM 2/12.   Interventions/Recommendations Continue current diet order   Education Assessment   Education Patient/caregiver not appropriate for education at this time   Patient Nutrition Goals   Goal Avoid weight gain   Goal Status Initiated   Timeframe to complete goal by next f/u   Nutrition Complexity Risk   Nutrition complexity level Low risk   Follow up date 02/27/24

## 2024-02-13 NOTE — MALNUTRITION/BMI
This medical record reflects one or more clinical indicators suggestive of malnutrition and/or morbid obesity.      BMI Findings:  Adult BMI Classifications: Morbid Obesity 40-44.9     Energy intake > energy output over time.     Body mass index is 43.74 kg/m².     See Nutrition note dated 2/13/2024 for additional details.  Completed nutrition assessment is viewable in the nutrition documentation.

## 2024-02-13 NOTE — PROGRESS NOTES
02/13/24 1020   Team Meeting   Meeting Type Tx Team Meeting   Team Members Present   Team Members Present Physician;Nurse;   Physician Team Member Dr. Marietta MD   Nursing Team Member Lety Olmedo, RN   Care Management Team Member Elif Laws, MS, NCC, LPC   Patient/Family Present   Patient Present Yes   Patient's Family Present No     PT met with TX team, reviewed TX plan and goals, all in agreement and signed.

## 2024-02-13 NOTE — TREATMENT PLAN
TREATMENT PLAN REVIEW - Behavioral Health Leydi Khan 54 y.o. 1970 female MRN: [id number]    [facility] Room / Bed: [facility] 203/[facility] 203-01 Encounter: [id number]          Admit Date/Time:  2/12/2024  2:16 PM    Treatment Team:   MD Eulalia Levine MD Nicole Barrett Nicole L Saas Paul F Klose, RN Veronda Sestok Celeste Pawling Sarah Matika, RD Patricia Solt, RN    Diagnosis: Principal Problem:    MDD (major depressive disorder), recurrent, severe, with psychosis ([facility])  Active Problems:    Primary hypertension    Hyperlipidemia    Class 3 severe obesity due to excess calories without serious comorbidity in adult (HCC)    PTSD (post-traumatic stress disorder)    Seizures ([facility])      Patient Strengths/Assets: compliant with medication, negotiates basic needs, patient is on a voluntary commitment    Patient Barriers/Limitations: difficulty adapting, limited family ties, poor reasoning ability, self-care deficit    Short Term Goals: decrease in depressive symptoms, decrease in anxiety symptoms, decrease in suicidal thoughts, ability to stay safe on the unit, ability to stay free of restraints, improvement in reasoning ability, sleep improvement, improvement in appetite, mood stabilization, acceptance of need for psychiatric treatment, acceptance of psychiatric medications    Long Term Goals: improvement in depression, improvement in anxiety, stabilization of mood, free of suicidal thoughts, improvement in reasoning ability, improved insight, acceptance of need for psychiatric medications, acceptance of need for psychiatric treatment, adequate self care, adequate sleep, adequate appetite    Progress Towards Goals: starting psychiatric medications as prescribed    Recommended Treatment: medication management, patient medication education, group therapy, milieu therapy, continued Behavioral Health psychiatric  evaluation/assessment process, medical follow up with medical team    Treatment Frequency: daily medication monitoring, group and milieu therapy daily, monitoring through interdisciplinary rounds, monitoring through weekly patient care conferences    Expected Discharge Date: 10 midnights     Discharge Plan: will be determined    Treatment Plan Created/Updated By: Renato Mcmanus MD

## 2024-02-13 NOTE — H&P
"Psychiatric Evaluation - Behavioral Health     Identification Data:Leydi Khan 54 y.o. female MRN: [id number]  Unit/Bed#: [facility] 203-01 Encounter: [id number]    Chief Complaint: depression, anxiety, suicidal ideation, and self-abusive behavior    History of Present Illness     Leydi Khan is a 54 y.o. female with a history of Major Depressive Disorder, anxiety, and PTSD who was admitted to the inpatient older adult psychiatric unit on a voluntary 201 commitment basis due to depression, anxiety, unstable mood, suicidal ideation, and self-abusive behavior. Patient was brought by EMS/police to ED accompanied by her group home staff after she cut her forearm superficially using a fork. She also verbalized suicidal ideation with plan to cut her wrist along with worsening of auditory hallucinations. UDS was negative. EKG with no acute changes.   On evaluation in the inpatient psychiatric unit Leydi presents anxious, restless but able to engage in appropriate conversation. Patient reports that for the past several weeks she has been feeling increasingly anxious, depressed, increased hopeless and helplessness with suicidal ideation to cut her wrists or hang herself. Pt admits she cut her forearm   using a fork in as well as writing \"kill Leydi\" on her left arm. Denies any active plan or intent to hurt herself and she is able to contract for safety presently. She feels fairly adjusted to the unit since she is well known to unit team due to past admissions. Pt verbalizes feeling anxious due to ongoing auditory hallucination periodically but denies any command to hurt herself and is more derogatory at this time. Denies any current paranoid delusion, homicidal ideation, recent manic episode, alcohol or illicit drug use.  Patient agreed to be compliant with medication and treatment plan.    Psychiatric Review Of Systems:    Sleep changes: decreased  Appetite changes:no  Weight changes: no  Energy: decreased  Interest/pleasure/: " "no  Anhedonia: no  Anxiety: yes  Fabienne: past mixed episodes  Guilt:  no  Hopeless:  yes  Self injurious behavior/risky behavior: yes, cutting arms  Suicidal ideation: yes, no plan  Homicidal ideation: no  Auditory hallucinations: yes, auditory hallucinations with derogatory comments  Visual hallucinations: no  Delusional thinking: paranoid thoughts  Eating disorder history: no  Obsessive/compulsive symptoms: no    Historical Information     Past Psychiatric History:     Past Inpatient Psychiatric Treatment:   Multiple past inpatient psychiatric admissions  Past Outpatient Psychiatric Treatment:    Currently in outpatient psychiatric treatment with a psychiatrist  Past Suicide Attempts: yes, by overdose on medications and cutting self  Past Violent Behavior: denies  Past Psychiatric Medication Trials: multiple psychiatric medication trials     Substance Abuse History:    Social History       Tobacco History       Smoking Status  Every Day Smoking Start Date  2/5/2024 Current Packs/Day  0.3 packs/day Average Packs/Day  0.3 packs/day Smoking Tobacco Type  Cigarettes since 2/5/2024, Cigars   Pack Year History     Packs/Day From To Years    0.25 2/5/2024  0.0      Smokeless Tobacco Use  Never      Tobacco Comments  Pt stated \"smokes when stressed\"              Alcohol History       Alcohol Use Status  Not Currently Comment  occasionally              Drug Use       Drug Use Status  Not Currently              Sexual Activity       Sexually Active  Not Asked              Activities of Daily Living    Not Asked                 Additional Substance Use Detail       Questions Responses    Problems Due to Past Use of Alcohol? No    Problems Due to Past Use of Substances? No    Substance Use Assessment Denies substance use within the past 12 months    Alcohol Use Frequency Denies use in past 12 months    Cannabis frequency Never used    Comment: Never used on 1/9/2021     Heroin Frequency Denies use in past 12 months    Cocaine " frequency Never used    Comment: Never used on 1/9/2021     Crack Cocaine Frequency Denies use in past 12 months    Methamphetamine Frequency Denies use in past 12 months    Narcotic Frequency Denies use in past 12 months    Benzodiazepine Frequency Denies use in past 12 months    Amphetamine frequency Denies use in past 12 months    Barbituate Frequency Denies use use in past 12 months    Inhalant frequency Never used    Comment: Never used on 1/9/2021     Hallucinogen frequency Never used    Comment: Never used on 1/9/2021     Ecstasy frequency Never used    Comment: Never used on 1/9/2021     Other drug frequency Never used    Comment: Never used on 1/9/2021     Opiate frequency Denies use in past 12 months    Last reviewed by Sheyla Ferrari LPN on 2/12/2024          I have assessed this patient for substance use within the past 12 months    Alcohol use: denies use  Recreational drug use: denies    Family Psychiatric History: denies    Social History:    Education: high school diploma/GED, Special education  Marital History: single  Children: none  Living Arrangement: lives in a group home  Occupational History: on permanent disability  Functioning Relationships: good support system  Legal History: none   History: None    Traumatic History:   Yes    Past Medical History:      Past Medical History:   Diagnosis Date    Anxiety     Bipolar 1 disorder (HCC)     Bipolar affective disorder, depressed, severe (HCC) 10/10/2021    Borderline personality disorder (HCC)     CAD (coronary artery disease)     Cognitive impairment     Depression     Dyslipidemia     GERD (gastroesophageal reflux disease)     Hypertension     Obesity     Psychiatric disorder     Psychiatric illness     PTSD (post-traumatic stress disorder)     Schizoaffective disorder (HCC)     Seizure (HCC)      Past Surgical History:   Procedure Laterality Date    APPENDECTOMY      PATIENT DENIES HAVING APPENDIX REMOVED    CHOLECYSTECTOMY      FOOT  SURGERY Right        Medical Review Of Systems:    Pertinent items are noted in HPI.    Allergies:    Allergies   Allergen Reactions    Fish Oil - Food Allergy Other (See Comments)     unknown    Fish-Derived Products - Food Allergy Hives       Medications:     All current active medications have been reviewed.    OBJECTIVE:    Vital signs in last 24 hours:    Temp:  [98.2 °F (36.8 °C)-98.5 °F (36.9 °C)] 98.2 °F (36.8 °C)  HR:  [88-99] 88  Resp:  [18] 18  BP: (112-140)/(57-79) 114/59    No intake or output data in the 24 hours ending 02/13/24 0710     Mental Status Evaluation:    Appearance:  age appropriate, overweight   Behavior:  cooperative   Speech:  normal rate and volume   Mood:  depressed, dysphoric   Affect:  tearful   Language: naming objects   Thought Process:  goal directed, concrete   Associations: concrete associations   Thought Content:  mild paranoia   Perceptual Disturbances: auditory hallucinations with derogatory comments   Risk Potential: Suicidal ideation - Yes, fleeting suicidal thoughts  Homicidal ideation - None  Potential for aggression - Not at present   Sensorium:  oriented to person, place, and time/date   Memory:  recent and remote memory grossly intact   Consciousness:  alert and awake   Attention: attention span and concentration are age appropriate   Intellect: average   Fund of Knowledge: awareness of current events: limited   Insight:  poor   Judgment: limited   Muscle Strength Muscle Tone: normal  normal   Gait/Station: normal gait/station   Motor Activity: no abnormal movements       Laboratory Results:   I have personally reviewed all pertinent laboratory/tests results.  Most Recent Labs:   Lab Results   Component Value Date    WBC 8.24 02/11/2024    RBC 4.15 02/11/2024    HGB 12.7 02/11/2024    HCT 38.1 02/11/2024     02/11/2024    RDW 14.6 02/11/2024    TOTANEUTABS 3.94 11/25/2023    NEUTROABS 5.36 02/11/2024    SODIUM 135 02/11/2024    K 4.1 02/11/2024    CL 98  02/11/2024    CO2 30 02/11/2024    BUN 14 02/11/2024    CREATININE 0.72 02/11/2024    GLUC 116 02/11/2024    GLUF 130 (H) 12/13/2022    CALCIUM 9.7 02/11/2024    AST 14 02/11/2024    ALT 15 02/11/2024    ALKPHOS 58 02/11/2024    TP 7.7 02/11/2024    ALB 4.1 02/11/2024    TBILI 0.47 02/11/2024    CHOLESTEROL 212 (H) 11/03/2023    HDL 42 (L) 11/03/2023    TRIG 223 (H) 11/03/2023    LDLCALC 125 (H) 11/03/2023    NONHDLC 170 11/03/2023    VALPROICTOT 48 (L) 11/25/2023    [id number] 2.768 02/11/2024    PREGUR negative 11/06/2022    PREGSERUM Negative 04/28/2022    RPR Non-Reactive 12/13/2022    HGBA1C 4.5 08/08/2023    EAG 82 08/08/2023       Imaging Studies:   No results found.    Code Status: Level 1 - Full Code  Advance Directive and Living Will: <no information>    Assessment/Plan   Principal Problem:    MDD (major depressive disorder), recurrent, severe, with psychosis ([facility])  Active Problems:    Primary hypertension    Hyperlipidemia    Class 3 severe obesity due to excess calories without serious comorbidity in adult (HCC)    PTSD (post-traumatic stress disorder)    Seizures (HCC)      Patient Strengths: cooperative, negotiates basic needs, patient is on a voluntary commitment     Patient Barriers: chronic mental illness, poor insight, poor reasoning ability    Treatment Plan:     Planned Treatment and Medication Changes:    All current active medications have been reviewed  Encourage group therapy, milieu therapy and occupational therapy  Behavioral Health checks every 7 minutes  Prestig 50 mg po daily, Depakote DR 1000 mg po hs  Prazosin  2 mg po hs, Risperidal 3 mg po bid  Trazodone 50 mg po hs    Risks / Benefits of Treatment:    Risks, benefits, and possible side effects of medications explained to patient and patient verbalizes understanding and agreement for treatment.    Counseling / Coordination of Care:    Patient's presentation on admission and proposed treatment plan discussed with treatment  team.  Diagnosis, medication changes and treatment plan reviewed with patient.  Events leading to admission reviewed with patient.    Inpatient Psychiatric Certification:    Estimated length of stay: 10 midnights      Renato Mcmanus MD 02/13/24

## 2024-02-13 NOTE — NURSING NOTE
"Patient with  mild relief from anxiety S/P PRN Ativan. Currently in dining room socializing with peers. Remains on 7\" checks for safety and behaviors.  "

## 2024-02-13 NOTE — PLAN OF CARE
Problem: Risk for Self Injury/Neglect  Goal: Treatment Goal: Remain safe during length of stay, learn and adopt new coping skills, and be free of self-injurious ideation, impulses and acts at the time of discharge  Outcome: Progressing  Goal: Verbalize thoughts and feelings  Description: Interventions:  - Assess and re-assess patient's lethality and potential for self-injury  - Engage patient in 1:1 interactions, daily, for a minimum of 15 minutes  - Encourage patient to express feelings, fears, frustrations, hopes  - Establish rapport/trust with patient   Outcome: Progressing  Goal: Refrain from harming self  Description: Interventions:  - Monitor patient closely, per order  - Develop a trusting relationship  - Supervise medication ingestion, monitor effects and side effects   Outcome: Progressing  Goal: Attend and participate in unit activities, including therapeutic, recreational, and educational groups  Description: Interventions:  - Provide therapeutic and educational activities daily, encourage attendance and participation, and document same in the medical record  - Obtain collateral information, encourage visitation and family involvement in care   Outcome: Progressing  Goal: Recognize maladaptive responses and adopt new coping mechanisms  Outcome: Progressing  Goal: Complete daily ADLs, including personal hygiene independently, as able  Description: Interventions:  - Observe, teach, and assist patient with ADLS  - Monitor and promote a balance of rest/activity, with adequate nutrition and elimination  Outcome: Progressing     Problem: Depression  Goal: Treatment Goal: Demonstrate behavioral control of depressive symptoms, verbalize feelings of improved mood/affect, and adopt new coping skills prior to discharge  Outcome: Progressing  Goal: Verbalize thoughts and feelings  Description: Interventions:  - Assess and re-assess patient's level of risk   - Engage patient in 1:1 interactions, daily, for a minimum  of 15 minutes   - Encourage patient to express feelings, fears, frustrations, hopes   Outcome: Progressing  Goal: Refrain from harming self  Description: Interventions:  - Monitor patient closely, per order   - Supervise medication ingestion, monitor effects and side effects   Outcome: Progressing  Goal: Refrain from isolation  Description: Interventions:  - Develop a trusting relationship   - Encourage socialization   Outcome: Progressing  Goal: Refrain from self-neglect  Outcome: Progressing  Goal: Attend and participate in unit activities, including therapeutic, recreational, and educational groups  Description: Interventions:  - Provide therapeutic and educational activities daily, encourage attendance and participation, and document same in the medical record   Outcome: Progressing  Goal: Complete daily ADLs, including personal hygiene independently, as able  Description: Interventions:  - Observe, teach, and assist patient with ADLS  -  Monitor and promote a balance of rest/activity, with adequate nutrition and elimination   Outcome: Progressing     Problem: Anxiety  Goal: Anxiety is at manageable level  Description: Interventions:  - Assess and monitor patient's anxiety level.   - Monitor for signs and symptoms (heart palpitations, chest pain, shortness of breath, headaches, nausea, feeling jumpy, restlessness, irritable, apprehensive).   - Collaborate with interdisciplinary team and initiate plan and interventions as ordered.  - [place] patient to unit/surroundings  - Explain treatment plan  - Encourage participation in care  - Encourage verbalization of concerns/fears  - Identify coping mechanisms  - Assist in developing anxiety-reducing skills  - Administer/offer alternative therapies  - Limit or eliminate stimulants  Outcome: Progressing

## 2024-02-14 PROCEDURE — 99233 SBSQ HOSP IP/OBS HIGH 50: CPT | Performed by: PSYCHIATRY & NEUROLOGY

## 2024-02-14 RX ORDER — ACETAMINOPHEN 325 MG/1
975 TABLET ORAL EVERY 6 HOURS PRN
Status: DISCONTINUED | OUTPATIENT
Start: 2024-02-14 | End: 2024-02-22 | Stop reason: HOSPADM

## 2024-02-14 RX ORDER — IBUPROFEN 600 MG/1
600 TABLET ORAL EVERY 6 HOURS PRN
Status: DISCONTINUED | OUTPATIENT
Start: 2024-02-14 | End: 2024-02-22 | Stop reason: HOSPADM

## 2024-02-14 RX ORDER — DESVENLAFAXINE SUCCINATE 50 MG/1
100 TABLET, EXTENDED RELEASE ORAL DAILY
Status: DISCONTINUED | OUTPATIENT
Start: 2024-02-15 | End: 2024-02-22 | Stop reason: HOSPADM

## 2024-02-14 RX ADMIN — RISPERIDONE 3 MG: 1 TABLET ORAL at 21:47

## 2024-02-14 RX ADMIN — RISPERIDONE 3 MG: 1 TABLET ORAL at 08:29

## 2024-02-14 RX ADMIN — DIVALPROEX SODIUM 1000 MG: 500 TABLET, DELAYED RELEASE ORAL at 21:47

## 2024-02-14 RX ADMIN — PRAZOSIN HYDROCHLORIDE 2 MG: 1 CAPSULE ORAL at 21:47

## 2024-02-14 RX ADMIN — ATORVASTATIN CALCIUM 10 MG: 10 TABLET, FILM COATED ORAL at 21:47

## 2024-02-14 RX ADMIN — LISINOPRIL 20 MG: 20 TABLET ORAL at 08:29

## 2024-02-14 RX ADMIN — TRAZODONE HYDROCHLORIDE 50 MG: 50 TABLET ORAL at 21:48

## 2024-02-14 RX ADMIN — Medication 3 MG: at 21:48

## 2024-02-14 RX ADMIN — DESVENLAFAXINE 50 MG: 50 TABLET, FILM COATED, EXTENDED RELEASE ORAL at 08:29

## 2024-02-14 RX ADMIN — PANTOPRAZOLE SODIUM 20 MG: 20 TABLET, DELAYED RELEASE ORAL at 06:13

## 2024-02-14 RX ADMIN — Medication: at 21:48

## 2024-02-14 NOTE — PLAN OF CARE
Problem: Ineffective Coping  Goal: Participates in unit activities  Description: Interventions:  - Provide therapeutic environment   - Provide required programming   - Redirect inappropriate behaviors   Outcome: Progressing     Problem: Risk for Self Injury/Neglect  Goal: Attend and participate in unit activities, including therapeutic, recreational, and educational groups  Description: Interventions:  - Provide therapeutic and educational activities daily, encourage attendance and participation, and document same in the medical record  - Obtain collateral information, encourage visitation and family involvement in care   Outcome: Progressing     Problem: Depression  Goal: Attend and participate in unit activities, including therapeutic, recreational, and educational groups  Description: Interventions:  - Provide therapeutic and educational activities daily, encourage attendance and participation, and document same in the medical record   Outcome: Progressing   Patient joining all groups offered daily.

## 2024-02-14 NOTE — PROGRESS NOTES
"Progress Note - Behavioral Health     Leydi Khan 54 y.o. female MRN: [id number]   Unit/Bed#: [facility] 203-01 Encounter: [id number]    Behavior over the last 24 hours: unchanged.     Leydi was seen for continuation of care. She reports that she is feeling \"highly depressed\" and that she is having thoughts of suicide and self-harm. Plans to repeatedly hit her head against a wall. She says this is all because of her past trauma and because people are \"making fun of her\" at the group home. In addition, she is having auditory hallucinations of her ex-boyfriend, telling her that she is \"not good enough\" or that she is \"fat and ugly.\" She reports anhedonia, excess guilt, decreased concentration, and troubles sleeping. She is eating 3 meals a day and is using word searches and coloring as a coping method, but claims that \"nothing is fun.\" Denies anxiety, VH, and HI. States she is taking medications and is not experiencing any side effects.    Sleep: decreased  Appetite: fair  Medication side effects: Denies  ROS: no complaints, all other systems are negative    Mental Status Evaluation:    Appearance:  looks older than stated age, overweight   Behavior:  cooperative   Speech:  normal rate, normal volume   Mood:  depressed   Affect:  constricted, mood-congruent   Thought Process:  goal directed   Associations: concrete associations   Thought Content:  negative thoughts   Perceptual Disturbances: auditory hallucinations with derogatory comments. Voice of her ex-boyfriend telling her she is \"no good and fat/ ugly\"   Risk Potential: Suicidal ideation - Yes, with plan to hit head against the wall  Homicidal ideation - None   Sensorium:  oriented to person, place, and time/date   Memory:  recent and remote memory grossly intact   Consciousness:  alert and awake   Attention: attention span and concentration are age appropriate   Insight:  poor   Judgment: poor   Gait/Station: normal gait/station   Motor Activity: no abnormal movements "     Vital signs in last 24 hours:    Temp:  [98.1 °F (36.7 °C)-98.8 °F (37.1 °C)] 98.1 °F (36.7 °C)  HR:  [82-96] 92  Resp:  [18] 18  BP: (123-132)/(71-78) 130/77    Laboratory results: I have personally reviewed all pertinent laboratory/tests results.  Most Recent Labs:   Lab Results   Component Value Date    WBC 8.24 02/11/2024    RBC 4.15 02/11/2024    HGB 12.7 02/11/2024    HCT 38.1 02/11/2024     02/11/2024    RDW 14.6 02/11/2024    TOTANEUTABS 3.94 11/25/2023    NEUTROABS 5.36 02/11/2024    SODIUM 135 02/11/2024    K 4.1 02/11/2024    CL 98 02/11/2024    CO2 30 02/11/2024    BUN 14 02/11/2024    CREATININE 0.72 02/11/2024    GLUC 116 02/11/2024    GLUF 130 (H) 12/13/2022    CALCIUM 9.7 02/11/2024    AST 14 02/11/2024    ALT 15 02/11/2024    ALKPHOS 58 02/11/2024    TP 7.7 02/11/2024    ALB 4.1 02/11/2024    TBILI 0.47 02/11/2024    CHOLESTEROL 212 (H) 11/03/2023    HDL 42 (L) 11/03/2023    TRIG 223 (H) 11/03/2023    LDLCALC 125 (H) 11/03/2023    NONHDLC 170 11/03/2023    VALPROICTOT 48 (L) 11/25/2023    [id number] 2.768 02/11/2024    PREGUR negative 11/06/2022    PREGSERUM Negative 04/28/2022    RPR Non-Reactive 12/13/2022    HGBA1C 4.5 08/08/2023    EAG 82 08/08/2023       Assessment/Plan   Principal Problem:    MDD (major depressive disorder), recurrent, severe, with psychosis (HCC)  Active Problems:    Primary hypertension    Hyperlipidemia    Class 3 severe obesity due to excess calories without serious comorbidity in adult (HCC)    PTSD (post-traumatic stress disorder)    Seizures (HCC)    Recommended Treatment:     Planned medication and treatment changes:    All current active medications have been reviewed  Encourage group therapy, milieu therapy and occupational therapy  Behavioral Health checks every 7 minutes  Increase Prestig to 100 mg po daily     Current Facility-Administered Medications   Medication Dose Route Frequency Provider Last Rate    acetaminophen  650 mg Oral Q4H PRN Aditya  Garbielle-Anom, CRNP      acetaminophen  650 mg Oral Q4H PRN Aditya Gabrielle-Anom, CRNP      acetaminophen  975 mg Oral Q6H PRN Sarah Mckay PA-C      ammonium lactate   Topical BID Sudhir Jarquin MD      atorvastatin  10 mg Oral HS Aditya Gabrielle-Anom, CRNP      benztropine  0.5 mg Oral Q4H PRN Max 6/day Aditya Gabrielle-Anom, CRNP      [START ON 4/30/2024] cholecalciferol  1,000 Units Oral Daily Sudhir Jarquin MD      desvenlafaxine succinate  50 mg Oral Daily [place] Gabrielle-Anom, CRNP      divalproex sodium  1,000 mg Oral HS [place] Gabrielle-Anom, CRNP      ergocalciferol  50,000 Units Oral Weekly Sudhir Jarquin MD      hydrOXYzine HCL  25 mg Oral Q6H PRN Max 4/day Aditya Gabrielle-Anom, CRNP      ibuprofen  600 mg Oral Q6H PRN Sarah Mckay PA-C      lisinopril  20 mg Oral Daily Aditya Gabrielle-Anom, CRNP      LORazepam  1 mg Intramuscular Q6H PRN Max 3/day [place] Gabrielle-Anom, CRNP      LORazepam  0.5 mg Oral Q6H PRN Max 4/day [place] Gabrielle-Anom, CRNP      LORazepam  1 mg Oral Q6H PRN Max 3/day [place] Gabrielle-Anom, CRNP      melatonin  3 mg Oral HS [place] Gabrielle-Anom, CRNP      nicotine polacrilex  2 mg Oral Q2H PRN Sudhir Jarquin MD      OLANZapine  5 mg Intramuscular Q3H PRN Max 3/day [place] Gabrielle-Anom, CRNP      OLANZapine  2.5 mg Oral Q4H PRN Max 6/day [place] Gabrielle-Anom, CRNP      OLANZapine  5 mg Oral Q4H PRN Max 3/day [place] Gabrielle-Anom, CRNP      OLANZapine  5 mg Oral Q3H PRN Max 3/day [place] Gabrielle-Anom, CRNP      pantoprazole  20 mg Oral Early Morning Aditya Gabrielle-Anom, CRNP      prazosin  2 mg Oral HS [place] Gabrielle-Anom, CRNP      risperiDONE  3 mg Oral BID [place] Gabrielle-Anom, CRNP      traZODone  50 mg Oral HS PRN [place] Gabrielle-Anom, CRNP      traZODone  50 mg Oral HS Renato Mcmanus MD         Risks / Benefits of Treatment:    Risks, benefits, and possible side effects of medications explained to patient and patient verbalizes understanding and agreement for treatment.    Counseling / Coordination of  Care:    Patient's progress discussed with staff in treatment team meeting.  Medications, treatment progress and treatment plan reviewed with patient.  Supportive therapy provided to patient.  Group attendance encouraged.    Renato Mcmanus MD 02/14/24

## 2024-02-14 NOTE — NURSING NOTE
Patient stated she was feeling suicidal . Nurse had patient change into paper scrubs and patient currently sitting in chair by nurses station.safety precautions maintained.

## 2024-02-14 NOTE — PLAN OF CARE
Problem: Risk for Self Injury/Neglect  Goal: Treatment Goal: Remain safe during length of stay, learn and adopt new coping skills, and be free of self-injurious ideation, impulses and acts at the time of discharge  Outcome: Progressing  Goal: Verbalize thoughts and feelings  Description: Interventions:  - Assess and re-assess patient's lethality and potential for self-injury  - Engage patient in 1:1 interactions, daily, for a minimum of 15 minutes  - Encourage patient to express feelings, fears, frustrations, hopes  - Establish rapport/trust with patient   Outcome: Progressing  Goal: Refrain from harming self  Description: Interventions:  - Monitor patient closely, per order  - Develop a trusting relationship  - Supervise medication ingestion, monitor effects and side effects   Outcome: Progressing  Goal: Attend and participate in unit activities, including therapeutic, recreational, and educational groups  Description: Interventions:  - Provide therapeutic and educational activities daily, encourage attendance and participation, and document same in the medical record  - Obtain collateral information, encourage visitation and family involvement in care   Outcome: Progressing  Goal: Recognize maladaptive responses and adopt new coping mechanisms  Outcome: Progressing  Goal: Complete daily ADLs, including personal hygiene independently, as able  Description: Interventions:  - Observe, teach, and assist patient with ADLS  - Monitor and promote a balance of rest/activity, with adequate nutrition and elimination  Outcome: Progressing     Problem: Depression  Goal: Treatment Goal: Demonstrate behavioral control of depressive symptoms, verbalize feelings of improved mood/affect, and adopt new coping skills prior to discharge  Outcome: Progressing  Goal: Verbalize thoughts and feelings  Description: Interventions:  - Assess and re-assess patient's level of risk   - Engage patient in 1:1 interactions, daily, for a minimum  of 15 minutes   - Encourage patient to express feelings, fears, frustrations, hopes   Outcome: Progressing  Goal: Refrain from harming self  Description: Interventions:  - Monitor patient closely, per order   - Supervise medication ingestion, monitor effects and side effects   Outcome: Progressing  Goal: Refrain from isolation  Description: Interventions:  - Develop a trusting relationship   - Encourage socialization   Outcome: Progressing  Goal: Refrain from self-neglect  Outcome: Progressing  Goal: Attend and participate in unit activities, including therapeutic, recreational, and educational groups  Description: Interventions:  - Provide therapeutic and educational activities daily, encourage attendance and participation, and document same in the medical record   Outcome: Progressing  Goal: Complete daily ADLs, including personal hygiene independently, as able  Description: Interventions:  - Observe, teach, and assist patient with ADLS  -  Monitor and promote a balance of rest/activity, with adequate nutrition and elimination   Outcome: Progressing     Problem: Anxiety  Goal: Anxiety is at manageable level  Description: Interventions:  - Assess and monitor patient's anxiety level.   - Monitor for signs and symptoms (heart palpitations, chest pain, shortness of breath, headaches, nausea, feeling jumpy, restlessness, irritable, apprehensive).   - Collaborate with interdisciplinary team and initiate plan and interventions as ordered.  - [place] patient to unit/surroundings  - Explain treatment plan  - Encourage participation in care  - Encourage verbalization of concerns/fears  - Identify coping mechanisms  - Assist in developing anxiety-reducing skills  - Administer/offer alternative therapies  - Limit or eliminate stimulants  Outcome: Progressing

## 2024-02-14 NOTE — SOCIAL WORK
ROSE placed call to [facility]  to notify of PT admission. Spoke with Alba she will update team. Call ended mutually.     ROSE placed call to [facility]  to notify of PT admission. Spoke with Elida she will update team. Call ended mutually.     ROSE placed call to [facility]  to notify of PT admission. Spoke with Jud whom will update team as well as PT  Letitia. Scripts to be sent to [facility] in [place]. Fax discharge summary to . Reviewed d/c for next week, Jud in agreement. Jud indicated no major changes or triggers in home, baseline behaviors. Call ended mutually.

## 2024-02-14 NOTE — PROGRESS NOTES
"Progress Note - Leydi Khan 54 y.o. female MRN: [id number]    Unit/Bed#: [facility] 203-01 Encounter: [id number]        Subjective:   Patient seen and examined at bedside after reviewing the chart and discussing the case with the caring staff.      Patient examined at bedside.  Patient complaining of a mild headache and requesting ibuprofen which she usually takes at home.  She otherwise denies any other symptoms.     Physical Exam   Vitals: Blood pressure 130/77, pulse 92, temperature 98.1 °F (36.7 °C), temperature source Temporal, resp. rate 18, height 5' 6\" (1.676 m), weight 123 kg (271 lb), SpO2 94%.,Body mass index is 43.74 kg/m².  Constitutional: Patient in no acute distress.   HEENT: PERR, EOMI, MMM.  Cardiovascular: Normal rate and regular rhythm.    Pulmonary/Chest: Effort normal and breath sounds normal.   Abdomen: Soft, + BS, NT.    Assessment/Plan:  Leydi Khan is a(n) 54 y.o. year old female with MDD.     1.  Cardiac with hx of HTN, HLD.  Continue lisinopril 20 mg daily and atorvastatin 10 mg daily.  2.  Migraine headaches.  Add ibuprofen as needed.  3.  Tobacco abuse.  Declines nicotine patch.  Nicotine gum as needed.  4.  Seizure disorder.  Patient is on Depakote ER 1000 mg at bedtime.  5.  DJD/OA/chronic low back pain. Tylenol as needed.  6.  GERD.  Patient is on Protonix 20 mg daily.  7.  Dry cracked skin bilateral heel.  Apply ammonium lactate 2 times daily.  8.  Vitamin D deficiency.  Patient started on vitamin D2 09440 units weekly for 12 weeks followed by vitamin D3 1000 units daily.    The patient was discussed with Dr. Jarquin and he is in agreement with the above note.  "

## 2024-02-14 NOTE — NURSING NOTE
"Patient slept overnight.  Continues to sit at nursing station in recliner per her request (due to feeling \"safe with staff around\").  No complaint during shift.  No respiratory distress or change in medical condition.  Unable to assess suicidal ideations or hallucinations at present.  Maintained on q 7 minute safety checks.  "

## 2024-02-14 NOTE — NURSING NOTE
"Patient sitting in hallway by nurse's station.  States she feels \"safer\" while being around staff.  Needy and intrusive at times.  Mood labile. Wrote multi paged note (mostly in third person) with various death themes. Patient is having occasional suicidal ideations, but feels safe at nurse's station.  She will let staff know if she feels like acting on thoughts.  Continues to have command auditory hallucinations.  Stated she had BM yesterday.  Taking fluids and snack. Maintained on q 7 minute safety checks. Will continue to monitored.   "
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
## 2024-02-14 NOTE — NURSING NOTE
Patient stated she slept well at the nurses station last night because she felt safe there. She denied anxiety. Depression is high. Thoughts of SI were high but stated she feels safe around people on the unit and will not hurt herself while here. Denied HI. Stated she was hearing voices but then said they were her own thoughts. Attended group.Medication compliant. Safety precautions maintained.

## 2024-02-15 LAB
VALPROATE SERPL-MCNC: 46 UG/ML (ref 50–100)
VIT B12 SERPL-MCNC: 469 PG/ML (ref 180–914)

## 2024-02-15 PROCEDURE — 99232 SBSQ HOSP IP/OBS MODERATE 35: CPT | Performed by: PSYCHIATRY & NEUROLOGY

## 2024-02-15 PROCEDURE — 80164 ASSAY DIPROPYLACETIC ACD TOT: CPT

## 2024-02-15 RX ORDER — LIDOCAINE 50 MG/G
1 PATCH TOPICAL DAILY
Status: DISCONTINUED | OUTPATIENT
Start: 2024-02-15 | End: 2024-02-18

## 2024-02-15 RX ADMIN — LIDOCAINE 5% 1 PATCH: 700 PATCH TOPICAL at 12:27

## 2024-02-15 RX ADMIN — LISINOPRIL 20 MG: 20 TABLET ORAL at 08:28

## 2024-02-15 RX ADMIN — Medication 3 MG: at 21:12

## 2024-02-15 RX ADMIN — DESVENLAFAXINE 100 MG: 50 TABLET, FILM COATED, EXTENDED RELEASE ORAL at 08:28

## 2024-02-15 RX ADMIN — ACETAMINOPHEN 975 MG: 325 TABLET ORAL at 07:05

## 2024-02-15 RX ADMIN — Medication: at 21:12

## 2024-02-15 RX ADMIN — ATORVASTATIN CALCIUM 10 MG: 10 TABLET, FILM COATED ORAL at 21:12

## 2024-02-15 RX ADMIN — TRAZODONE HYDROCHLORIDE 50 MG: 50 TABLET ORAL at 21:12

## 2024-02-15 RX ADMIN — DIVALPROEX SODIUM 1000 MG: 500 TABLET, DELAYED RELEASE ORAL at 21:12

## 2024-02-15 RX ADMIN — RISPERIDONE 3 MG: 1 TABLET ORAL at 21:12

## 2024-02-15 RX ADMIN — PRAZOSIN HYDROCHLORIDE 2 MG: 1 CAPSULE ORAL at 21:12

## 2024-02-15 RX ADMIN — RISPERIDONE 3 MG: 1 TABLET ORAL at 08:28

## 2024-02-15 RX ADMIN — PANTOPRAZOLE SODIUM 20 MG: 20 TABLET, DELAYED RELEASE ORAL at 06:41

## 2024-02-15 NOTE — NURSING NOTE
Patient sitting at nurse's station (per her request) due to having thoughts of hurting herself if she was in room alone.  No acute behaviors. Bright and social with staff and peers. Denies anxiety, states depression is high.  Had shower, staff assisted.  Positive for HS snack.  Maintained on q 7 minute checks.

## 2024-02-15 NOTE — PROGRESS NOTES
"Progress Note - Leydi Khan 54 y.o. female MRN: [id number]    Unit/Bed#: [facility] 203-01 Encounter: [id number]        Subjective:   Patient seen and examined at bedside after reviewing the chart and discussing the case with the caring staff.      Patient examined at bedside.  Patient complaining of low back pain and requesting something for this.      Physical Exam   Vitals: Blood pressure 116/64, pulse 97, temperature 97.8 °F (36.6 °C), temperature source Temporal, resp. rate 18, height 5' 6\" (1.676 m), weight 123 kg (271 lb), SpO2 93%.,Body mass index is 43.74 kg/m².  Constitutional: Patient in no acute distress.   HEENT: PERR, EOMI, MMM.  Cardiovascular: Normal rate and regular rhythm.    Pulmonary/Chest: Effort normal and breath sounds normal.   Abdomen: Soft, + BS, NT.    Assessment/Plan:  Leydi Khan is a(n) 54 y.o. year old female with MDD.     1.  Cardiac with hx of HTN, HLD.  Continue lisinopril 20 mg daily and atorvastatin 10 mg daily.  2.  Migraine headaches.  Add ibuprofen as needed.  3.  Tobacco abuse.  Declines nicotine patch.  Nicotine gum as needed.  4.  Seizure disorder.  Patient is on Depakote ER 1000 mg at bedtime.  5.  DJD/OA/chronic low back pain.  Tylenol as needed.  Add lidocaine patch and Voltaren gel.  6.  GERD.  Patient is on Protonix 20 mg daily.  7.  Dry cracked skin bilateral heel.  Apply ammonium lactate 2 times daily.  8.  Vitamin D deficiency.  Patient started on vitamin D2 96328 units weekly for 12 weeks followed by vitamin D3 1000 units daily.    The patient was discussed with Dr. Jarquin and he is in agreement with the above note.  "

## 2024-02-15 NOTE — PROGRESS NOTES
02/15/24 [id number]   Team Meeting   Meeting Type Daily Rounds   Team Members Present   Team Members Present Physician;Nurse;;Occupational Therapist   Physician Team Member Dr. Marietta MD; SHANI Rubio   Nursing Team Member Shira Glez, RN   Care Management Team Member Elif Laws, MS, NCC, [facility]   OT Team Member Selma Arrington, MARIS   Patient/Family Present   Patient Present No   Patient's Family Present No   Suicidal ideations, thoughts to bang head, depressed, denies hi. Will return to [facility].

## 2024-02-15 NOTE — PROGRESS NOTES
"Progress Note - Behavioral Health     Leydi Khan 54 y.o. female MRN: [id number]   Unit/Bed#: [facility] 203-01 Encounter: [id number]    Behavior over the last 24 hours: unchanged.     Leydi was seen for continuation of care. She was confined to her room today, because she did not want to be around others. She reports that she is \"not doing well\". Still feeling depressed and that she is having thoughts of suicide and self-harm. Still verbalized having auditory hallucinations of her ex-boyfriend, telling her that she is \"not good enough\" or that she is \"fat and ugly.\" Patient is not sure if these are just her own thoughts or not. Her sleep was better last night but she did have some trouble falling asleep. Patient says she had an omelette for breakfast and pierogies and sausage for lunch. Denies anxiety, VH, and HI. States she is taking medications and is not experiencing any side effects.    Sleep: difficulty falling asleep  Appetite: fair  Medication side effects: Denies  ROS: all other systems are negative, except for back pain    Mental Status Evaluation:    Appearance:  looks older than stated age, overweight   Behavior:  cooperative   Speech:  normal rate, normal volume   Mood:  depressed   Affect:  constricted, mood-congruent   Thought Process:  goal directed   Associations: concrete associations   Thought Content:  negative thoughts   Perceptual Disturbances: auditory hallucinations with derogatory comments. Voice of her ex-boyfriend telling her she is \"no good and fat/ ugly.\" She is unsure if they are just her own thoughts   Risk Potential: Suicidal ideation - Yes, with plan to hit head against the wall  Homicidal ideation - None   Sensorium:  oriented to person, place, and time/date   Memory:  recent and remote memory grossly intact   Consciousness:  alert and awake   Attention: attention span and concentration are age appropriate   Insight:  poor   Judgment: poor   Gait/Station: in bed   Motor Activity: no " abnormal movements     Vital signs in last 24 hours:    Temp:  [97.6 °F (36.4 °C)-98.6 °F (37 °C)] 97.8 °F (36.6 °C)  HR:  [78-97] 97  Resp:  [16-18] 18  BP: ()/(53-64) 116/64    Laboratory results: I have personally reviewed all pertinent laboratory/tests results.  Most Recent Labs:   Lab Results   Component Value Date    WBC 8.24 02/11/2024    RBC 4.15 02/11/2024    HGB 12.7 02/11/2024    HCT 38.1 02/11/2024     02/11/2024    RDW 14.6 02/11/2024    TOTANEUTABS 3.94 11/25/2023    NEUTROABS 5.36 02/11/2024    SODIUM 135 02/11/2024    K 4.1 02/11/2024    CL 98 02/11/2024    CO2 30 02/11/2024    BUN 14 02/11/2024    CREATININE 0.72 02/11/2024    GLUC 116 02/11/2024    GLUF 130 (H) 12/13/2022    CALCIUM 9.7 02/11/2024    AST 14 02/11/2024    ALT 15 02/11/2024    ALKPHOS 58 02/11/2024    TP 7.7 02/11/2024    ALB 4.1 02/11/2024    TBILI 0.47 02/11/2024    CHOLESTEROL 212 (H) 11/03/2023    HDL 42 (L) 11/03/2023    TRIG 223 (H) 11/03/2023    LDLCALC 125 (H) 11/03/2023    NONHDLC 170 11/03/2023    VALPROICTOT 48 (L) 11/25/2023    NOJ2UCEOHRES 2.768 02/11/2024    PREGUR negative 11/06/2022    PREGSERUM Negative 04/28/2022    RPR Non-Reactive 12/13/2022    HGBA1C 4.5 08/08/2023    EAG 82 08/08/2023       Assessment/Plan   Principal Problem:    MDD (major depressive disorder), recurrent, severe, with psychosis (HCC)  Active Problems:    Primary hypertension    Hyperlipidemia    Class 3 severe obesity due to excess calories without serious comorbidity in adult (HCC)    PTSD (post-traumatic stress disorder)    Seizures (HCC)    Recommended Treatment:     Planned medication and treatment changes:    All current active medications have been reviewed  Encourage group therapy, milieu therapy and occupational therapy  Behavioral Health checks every 7 minutes  Requires continued inpatient treatment due to chronic illness and high risk of decompensation if discharged before long term stability is achieved     Current  Facility-Administered Medications   Medication Dose Route Frequency Provider Last Rate    acetaminophen  650 mg Oral Q4H PRN Aditya Gabrielle-Anom, CRNP      acetaminophen  650 mg Oral Q4H PRN Aditya Gabrielle-Anom, CRNP      acetaminophen  975 mg Oral Q6H PRN Sarah L DagJULIA garnettC      ammonium lactate   Topical BID Sudhir Jarquin MD      atorvastatin  10 mg Oral HS Aditya Gabrielle-Anom, CRNP      benztropine  0.5 mg Oral Q4H PRN Max 6/day Aditya Gabrielle-Anom, CRNP      [START ON 4/30/2024] cholecalciferol  1,000 Units Oral Daily Sudhir Jarquin MD      desvenlafaxine succinate  100 mg Oral Daily Renato Mcmanus MD      Diclofenac Sodium  2 g Topical 4x Daily PRN Sraah Mckay PA-C      divalproex sodium  1,000 mg Oral HS Aditya Gabrielle-Anom, CRNP      ergocalciferol  50,000 Units Oral Weekly Sudhir Jarquin MD      hydrOXYzine HCL  25 mg Oral Q6H PRN Max 4/day Aditya Gabrielle-Anom, CRNP      ibuprofen  600 mg Oral Q6H PRN JULIA TsangC      lidocaine  1 patch Topical Daily Sarah Mckay PA-C      lisinopril  20 mg Oral Daily Aditya Gabrielle-Anom, CRNP      LORazepam  1 mg Intramuscular Q6H PRN Max 3/day Aditya Gabrielle-Anom, CRNP      LORazepam  0.5 mg Oral Q6H PRN Max 4/day Aditya Gabrielle-Anom, CRNP      LORazepam  1 mg Oral Q6H PRN Max 3/day Aditya Gabrielle-Anom, CRNP      melatonin  3 mg Oral HS Steele Gabrielle-Anom, CRNP      nicotine polacrilex  2 mg Oral Q2H PRN Sudhir Jarquin MD      OLANZapine  5 mg Intramuscular Q3H PRN Max 3/day Aditya Gabrielle-Anom, CRNP      OLANZapine  2.5 mg Oral Q4H PRN Max 6/day Aditya Gabrielle-Anom, CRNP      OLANZapine  5 mg Oral Q4H PRN Max 3/day Aditya Gabrielle-Anom, CRNP      OLANZapine  5 mg Oral Q3H PRN Max 3/day Aditya Gabrielle-Anom, CRNP      pantoprazole  20 mg Oral Early Morning SHANI Hawk      prazosin  2 mg Oral HS SHANI Hawk      risperiDONE  3 mg Oral BID SHANI Hawk      traZODone  50 mg Oral HS PRN SHANI Hawk   50 mg Oral HS Renato Mcmanus MD         Risks / Benefits of Treatment:    Risks, benefits, and possible side effects of medications explained to patient and patient verbalizes understanding and agreement for treatment.    Counseling / Coordination of Care:    Patient's progress discussed with staff in treatment team meeting.  Medications, treatment progress and treatment plan reviewed with patient.  Supportive therapy provided to patient.  Group attendance encouraged.    Renato Mcmanus MD 02/15/24

## 2024-02-15 NOTE — PLAN OF CARE
Problem: Risk for Self Injury/Neglect  Goal: Treatment Goal: Remain safe during length of stay, learn and adopt new coping skills, and be free of self-injurious ideation, impulses and acts at the time of discharge  Outcome: Progressing  Goal: Verbalize thoughts and feelings  Description: Interventions:  - Assess and re-assess patient's lethality and potential for self-injury  - Engage patient in 1:1 interactions, daily, for a minimum of 15 minutes  - Encourage patient to express feelings, fears, frustrations, hopes  - Establish rapport/trust with patient   Outcome: Progressing  Goal: Refrain from harming self  Description: Interventions:  - Monitor patient closely, per order  - Develop a trusting relationship  - Supervise medication ingestion, monitor effects and side effects   Outcome: Progressing  Goal: Attend and participate in unit activities, including therapeutic, recreational, and educational groups  Description: Interventions:  - Provide therapeutic and educational activities daily, encourage attendance and participation, and document same in the medical record  - Obtain collateral information, encourage visitation and family involvement in care   Outcome: Progressing  Goal: Recognize maladaptive responses and adopt new coping mechanisms  Outcome: Progressing  Goal: Complete daily ADLs, including personal hygiene independently, as able  Description: Interventions:  - Observe, teach, and assist patient with ADLS  - Monitor and promote a balance of rest/activity, with adequate nutrition and elimination  Outcome: Progressing     Problem: Depression  Goal: Treatment Goal: Demonstrate behavioral control of depressive symptoms, verbalize feelings of improved mood/affect, and adopt new coping skills prior to discharge  Outcome: Progressing  Goal: Verbalize thoughts and feelings  Description: Interventions:  - Assess and re-assess patient's level of risk   - Engage patient in 1:1 interactions, daily, for a minimum  of 15 minutes   - Encourage patient to express feelings, fears, frustrations, hopes   Outcome: Progressing  Goal: Refrain from harming self  Description: Interventions:  - Monitor patient closely, per order   - Supervise medication ingestion, monitor effects and side effects   Outcome: Progressing  Goal: Refrain from isolation  Description: Interventions:  - Develop a trusting relationship   - Encourage socialization   Outcome: Progressing  Goal: Refrain from self-neglect  Outcome: Progressing  Goal: Attend and participate in unit activities, including therapeutic, recreational, and educational groups  Description: Interventions:  - Provide therapeutic and educational activities daily, encourage attendance and participation, and document same in the medical record   Outcome: Progressing  Goal: Complete daily ADLs, including personal hygiene independently, as able  Description: Interventions:  - Observe, teach, and assist patient with ADLS  -  Monitor and promote a balance of rest/activity, with adequate nutrition and elimination   Outcome: Progressing     Problem: Anxiety  Goal: Anxiety is at manageable level  Description: Interventions:  - Assess and monitor patient's anxiety level.   - Monitor for signs and symptoms (heart palpitations, chest pain, shortness of breath, headaches, nausea, feeling jumpy, restlessness, irritable, apprehensive).   - Collaborate with interdisciplinary team and initiate plan and interventions as ordered.  - [place] patient to unit/surroundings  - Explain treatment plan  - Encourage participation in care  - Encourage verbalization of concerns/fears  - Identify coping mechanisms  - Assist in developing anxiety-reducing skills  - Administer/offer alternative therapies  - Limit or eliminate stimulants  Outcome: Progressing

## 2024-02-15 NOTE — PROGRESS NOTES
02/14/24 [id number]   Team Meeting   Meeting Type Daily Rounds   Team Members Present   Team Members Present Physician;Nurse;;Occupational Therapist   Physician Team Member Dr. Marietta MD; SHANI Rubio   Nursing Team Member Lety Olmedo, JOSÉ MIGUEL   Care Management Team Member Elif Laws, MS, NCC, LPC   OT Team Member Selma Arrington , MARIS   Patient/Family Present   Patient Present No   Patient's Family Present No   Scratched arm yesterday, prn, high anxiety, command ah, contracts for safety. Will return to [facility] and follow up with new [facility].

## 2024-02-15 NOTE — NURSING NOTE
Patient has been visible on the unit throughout the day. She is pleasant and cooperative. During morning medication administration she stated she felt like hurting herself. Endorsed high depression. Denied SI or HI. Endorsed hearing voices but then stated they were her own thoughts. She is social with select peers. Continues to be in paper scrubs due to having thoughts of self harm. Safety precautions maintained.

## 2024-02-15 NOTE — NURSING NOTE
"Patient in recliner (by nurse's station) all night. Requested to do so so she won't \"bang head against the wall\".  Stated she feels safe around staff.  Patient observed sleeping during shift.  No acute behaviors noted.  No change in medical condition overnight.  Maintained on q 7 minute safety checks. Will continue to monitor.    "

## 2024-02-16 PROCEDURE — 99232 SBSQ HOSP IP/OBS MODERATE 35: CPT | Performed by: PSYCHIATRY & NEUROLOGY

## 2024-02-16 RX ADMIN — Medication: at 17:16

## 2024-02-16 RX ADMIN — RISPERIDONE 3 MG: 1 TABLET ORAL at 20:26

## 2024-02-16 RX ADMIN — Medication 3 MG: at 21:12

## 2024-02-16 RX ADMIN — DIVALPROEX SODIUM 1000 MG: 500 TABLET, DELAYED RELEASE ORAL at 21:13

## 2024-02-16 RX ADMIN — RISPERIDONE 3 MG: 1 TABLET ORAL at 08:28

## 2024-02-16 RX ADMIN — TRAZODONE HYDROCHLORIDE 50 MG: 50 TABLET ORAL at 21:13

## 2024-02-16 RX ADMIN — DESVENLAFAXINE 100 MG: 50 TABLET, FILM COATED, EXTENDED RELEASE ORAL at 08:28

## 2024-02-16 RX ADMIN — PRAZOSIN HYDROCHLORIDE 2 MG: 1 CAPSULE ORAL at 21:13

## 2024-02-16 RX ADMIN — PANTOPRAZOLE SODIUM 20 MG: 20 TABLET, DELAYED RELEASE ORAL at 06:40

## 2024-02-16 RX ADMIN — ATORVASTATIN CALCIUM 10 MG: 10 TABLET, FILM COATED ORAL at 21:13

## 2024-02-16 RX ADMIN — TRAZODONE HYDROCHLORIDE 50 MG: 50 TABLET ORAL at 21:12

## 2024-02-16 RX ADMIN — LISINOPRIL 20 MG: 20 TABLET ORAL at 08:28

## 2024-02-16 NOTE — NURSING NOTE
Patient noted to be visible on the milieu and social w/ select peers. Pleasant and cooperative throughout interaction. Continues to endorse severe depression and mild anxiety. Denied SI/HI. Denied hallucinations during interaction.Compliant w/ hs med regimen. Q 7 min safety checks continuous and maintained.

## 2024-02-16 NOTE — NURSING NOTE
Assumed care of pt at 1500. Pt currently sitting at nurses station for direct eyesight r/t pt reporting SI. Pts affect is incongruent with thoughts. She is observed smiling and pleasant with staff. Will monitor while awake until pt can report feeling safe. Pt remains in paper scrubs.

## 2024-02-16 NOTE — PROGRESS NOTES
"Progress Note - Behavioral Health     Leydi Khan 54 y.o. female MRN: [id number]   Unit/Bed#: [facility] 203-01 Encounter: [id number]    Behavior over the last 24 hours: unchanged.     Leydi was seen for continuation of care. She reports not doing well, feeling depressed and that she is having thoughts of self-harm. Continue verbalizing having auditory hallucinations telling her that she is \"not good enough\" and making derogatory comment. Her sleep was better last night and states her anxiety is slightly reduced this morning. Denies active plan or intent to hurt herself in the unit but wrote \"I am not ready to be discharge\". States she is taking medications and is not experiencing any side effects. Pt has been interacting with selective peers appropriately in the unit.    Sleep: slept better  Appetite: fair  Medication side effects: denies  ROS: all other systems are negative, except for back pain    Mental Status Evaluation:    Appearance:  age appropriate, overweight   Behavior:  cooperative   Speech:  normal rate, normal volume   Mood:  depressed, anxious   Affect:  constricted, mood-congruent   Thought Process:  goal directed   Associations: concrete associations   Thought Content:  negative thoughts   Perceptual Disturbances: auditory hallucinations with derogatory comments    Risk Potential: Suicidal ideation - Yes, without plan presently  Homicidal ideation - None   Sensorium:  oriented to person, place, and time/date   Memory:  recent and remote memory grossly intact   Consciousness:  alert and awake   Attention: attention span and concentration are age appropriate   Insight:  poor   Judgment: limited   Gait/Station: in bed   Motor Activity: no abnormal movements     Vital signs in last 24 hours:    Temp:  [97.6 °F (36.4 °C)-97.9 °F (36.6 °C)] 97.6 °F (36.4 °C)  HR:  [78-89] 78  Resp:  [16-18] 18  BP: (124-134)/(65-71) 124/71    Laboratory results: I have personally reviewed all pertinent laboratory/tests " results.  Most Recent Labs:   Lab Results   Component Value Date    WBC 8.24 02/11/2024    RBC 4.15 02/11/2024    HGB 12.7 02/11/2024    HCT 38.1 02/11/2024     02/11/2024    RDW 14.6 02/11/2024    TOTANEUTABS 3.94 11/25/2023    NEUTROABS 5.36 02/11/2024    SODIUM 135 02/11/2024    K 4.1 02/11/2024    CL 98 02/11/2024    CO2 30 02/11/2024    BUN 14 02/11/2024    CREATININE 0.72 02/11/2024    GLUC 116 02/11/2024    GLUF 130 (H) 12/13/2022    CALCIUM 9.7 02/11/2024    AST 14 02/11/2024    ALT 15 02/11/2024    ALKPHOS 58 02/11/2024    TP 7.7 02/11/2024    ALB 4.1 02/11/2024    TBILI 0.47 02/11/2024    CHOLESTEROL 212 (H) 11/03/2023    HDL 42 (L) 11/03/2023    TRIG 223 (H) 11/03/2023    LDLCALC 125 (H) 11/03/2023    NONHDLC 170 11/03/2023    VALPROICTOT 46 (L) 02/15/2024    [id number] 2.768 02/11/2024    PREGUR negative 11/06/2022    PREGSERUM Negative 04/28/2022    RPR Non-Reactive 12/13/2022    HGBA1C 4.5 08/08/2023    EAG 82 08/08/2023       Assessment/Plan   Principal Problem:    MDD (major depressive disorder), recurrent, severe, with psychosis (HCC)  Active Problems:    Primary hypertension    Hyperlipidemia    Class 3 severe obesity due to excess calories without serious comorbidity in adult (HCC)    PTSD (post-traumatic stress disorder)    Seizures (HCC)    Recommended Treatment:     Planned medication and treatment changes:    All current active medications have been reviewed  Encourage group therapy, milieu therapy and occupational therapy  Behavioral Health checks every 7 minutes  Requires continued inpatient treatment due to chronic illness and high risk of decompensation if discharged before long term stability is achieved     Current Facility-Administered Medications   Medication Dose Route Frequency Provider Last Rate    acetaminophen  650 mg Oral Q4H PRN SHANI Hawk      acetaminophen  650 mg Oral Q4H PRN ISABELL HawkNP      acetaminophen  975 mg Oral Q6H PRN Sarah SAENZ  JO Mckay      ammonium lactate   Topical BID Merged with [facility] MD Britton      atorvastatin  10 mg Oral HS Gibsonia Gabrielle-Anom, CRNP      benztropine  0.5 mg Oral Q4H PRN Max 6/day Aditya Gabrielle-Anom, CRNP      [START ON 4/30/2024] cholecalciferol  1,000 Units Oral Daily Sudhir Jarquin MD      desvenlafaxine succinate  100 mg Oral Daily Renato Mcmanus MD      Diclofenac Sodium  2 g Topical 4x Daily PRN Sarah Mckay PA-C      divalproex sodium  1,000 mg Oral HS Gibsonia Gabrielle-Anom, CRNP      ergocalciferol  50,000 Units Oral Weekly Sudhir Jarquin MD      hydrOXYzine HCL  25 mg Oral Q6H PRN Max 4/day Gibsonia Gabrielle-Anom, CRNP      ibuprofen  600 mg Oral Q6H PRN Sarah Mckay PA-C      lidocaine  1 patch Topical Daily Sarah Mckay PA-C      lisinopril  20 mg Oral Daily Gibsonia Gabrielle-Anom, CRNP      LORazepam  1 mg Intramuscular Q6H PRN Max 3/day Gibsonia Gabrielle-Anom, CRNP      LORazepam  0.5 mg Oral Q6H PRN Max 4/day Gibsonia Gabrielle-Anom, CRNP      LORazepam  1 mg Oral Q6H PRN Max 3/day Gibsonia Gabrielle-Anom, CRNP      melatonin  3 mg Oral HS Gibsonia Gabrielle-Anom, CRNP      nicotine polacrilex  2 mg Oral Q2H PRN Sudhir Jarquin MD      OLANZapine  5 mg Intramuscular Q3H PRN Max 3/day Gibsonia Gabrielle-Anom, CRNP      OLANZapine  2.5 mg Oral Q4H PRN Max 6/day Gibsonia Gabrielle-Anom, CRNP      OLANZapine  5 mg Oral Q4H PRN Max 3/day Gibsonia Gabrielle-Anom, CRNP      OLANZapine  5 mg Oral Q3H PRN Max 3/day Gibsonia Gabrielle-Anom, CRNP      pantoprazole  20 mg Oral Early Morning Gibsonia Gabrielle-Anom, CRNP      prazosin  2 mg Oral HS Gibsonia Gabrielle-Anom, CRNP      risperiDONE  3 mg Oral BID Gibsonia Gabrielle-Anom, CRNP      traZODone  50 mg Oral HS PRN Gibsonia Gabrielle-Anom, CRNP      traZODone  50 mg Oral HS Renato Mcmanus MD         Risks / Benefits of Treatment:    Risks, benefits, and possible side effects of medications explained to patient and patient verbalizes understanding and agreement for treatment.    Counseling / Coordination of  Care:    Patient's progress discussed with staff in treatment team meeting.  Medications, treatment progress and treatment plan reviewed with patient.  Supportive therapy provided to patient.  Group attendance encouraged.    Renato Mcmanus MD 02/16/24

## 2024-02-16 NOTE — SOCIAL WORK
CM met with PT for PT check in. Reviewed PT strengths and healthy coping skills. PT reflected on feeling not being ready to discharge. CM provided reassurance that PT is not discharging today but that we are looking at next week with return to facility. PT denies si/hi/vh, reported moderate anxiety and depression and AH.

## 2024-02-16 NOTE — NURSING NOTE
Patient was seen in her room by this writer for morning medication administration. She was just waking up . She stated she slept well . She was irritable. Denied anxiety but endorsed high depression. She stated she was feeling suicidal. Endorsed hearing voices telling her to hurt herself. Denied HI. Safety precautions maintained. Nurse had patient come out of her room to be visible among staff. Initially patient stated she was not going to take her medications but she did take them after re-approaching her. She continues to wear paper scrubs. Safety precautions maintained.

## 2024-02-16 NOTE — PLAN OF CARE
Problem: Ineffective Coping  Goal: Cooperates with admission process  Description: Interventions:   - Complete admission process  Outcome: Progressing  Goal: Participates in unit activities  Description: Interventions:  - Provide therapeutic environment   - Provide required programming   - Redirect inappropriate behaviors   Outcome: Progressing     Problem: Depression  Goal: Verbalize thoughts and feelings  Description: Interventions:  - Assess and re-assess patient's level of risk   - Engage patient in 1:1 interactions, daily, for a minimum of 15 minutes   - Encourage patient to express feelings, fears, frustrations, hopes   Outcome: Progressing  Goal: Attend and participate in unit activities, including therapeutic, recreational, and educational groups  Description: Interventions:  - Provide therapeutic and educational activities daily, encourage attendance and participation, and document same in the medical record   Outcome: Progressing     Problem: Anxiety  Goal: Anxiety is at manageable level  Description: Interventions:  - Assess and monitor patient's anxiety level.   - Monitor for signs and symptoms (heart palpitations, chest pain, shortness of breath, headaches, nausea, feeling jumpy, restlessness, irritable, apprehensive).   - Collaborate with interdisciplinary team and initiate plan and interventions as ordered.  - [place] patient to unit/surroundings  - Explain treatment plan  - Encourage participation in care  - Encourage verbalization of concerns/fears  - Identify coping mechanisms  - Assist in developing anxiety-reducing skills  - Administer/offer alternative therapies  - Limit or eliminate stimulants  Outcome: Progressing

## 2024-02-16 NOTE — PROGRESS NOTES
02/16/24 0947   Team Meeting   Meeting Type Daily Rounds   Team Members Present   Team Members Present Physician;Nurse;   Physician Team Member Dr. Marietta MD; SHANI Rubio   Nursing Team Member Shira Glez, RN   Care Management Team Member Yeimy Charles, LPC; Tali Rendon MSW; Elif Laws MS, NCC, LPC   Patient/Family Present   Patient Present No   Patient's Family Present No     Slept, suicidal, high depression, limit setting, encouragement with medications. Will return to [facility] and follow up with [facility] when stable.

## 2024-02-16 NOTE — PLAN OF CARE
Problem: Ineffective Coping  Goal: Participates in unit activities  Description: Interventions:  - Provide therapeutic environment   - Provide required programming   - Redirect inappropriate behaviors   Outcome: Progressing   Did attend RT AM group briefly. Minimally able to attend to topic or participate.

## 2024-02-16 NOTE — NURSING NOTE
Patient stated she was feeling suicidal. Patient was changed into paper clothing and currently sitting by nurses station coloring with staff nearby.

## 2024-02-16 NOTE — PROGRESS NOTES
"Progress Note - Leydi Khan 54 y.o. female MRN: [id number]    Unit/Bed#: [facility] 203-01 Encounter: [id number]        Subjective:   Patient seen and examined at bedside after reviewing the chart and discussing the case with the caring staff.      Patient examined at bedside.  Patient reports feeling tired today.  She otherwise denies any acute symptoms.    Physical Exam   Vitals: Blood pressure 124/71, pulse 78, temperature 97.6 °F (36.4 °C), temperature source Temporal, resp. rate 18, height 5' 6\" (1.676 m), weight 123 kg (271 lb), SpO2 90%.,Body mass index is 43.74 kg/m².  Constitutional: Patient in no acute distress.   HEENT: PERR, EOMI, MMM.  Cardiovascular: Normal rate and regular rhythm.    Pulmonary/Chest: Effort normal and breath sounds normal.   Abdomen: Soft, + BS, NT.    Assessment/Plan:  Leydi Khan is a(n) 54 y.o. year old female with MDD.     1.  Cardiac with hx of HTN, HLD.  Continue lisinopril 20 mg daily and atorvastatin 10 mg daily.  2.  Migraine headaches.  Ibuprofen as needed.  3.  Tobacco abuse.  Declines nicotine patch.  Nicotine gum as needed.  4.  Seizure disorder.  Patient is on Depakote ER 1000 mg at bedtime.  5.  DJD/OA/chronic low back pain.  Tylenol, lidocaine patch daily and Voltaren gel.  6.  GERD.  Patient is on Protonix 20 mg daily.  7.  Dry cracked skin bilateral heel.  Apply ammonium lactate 2 times daily.  8.  Vitamin D deficiency.  Patient started on vitamin D2 16525 units weekly for 12 weeks followed by vitamin D3 1000 units daily.    The patient was discussed with Dr. Jarquin and he is in agreement with the above note.  "

## 2024-02-17 PROCEDURE — 99232 SBSQ HOSP IP/OBS MODERATE 35: CPT | Performed by: PSYCHIATRY & NEUROLOGY

## 2024-02-17 RX ADMIN — DESVENLAFAXINE 100 MG: 50 TABLET, FILM COATED, EXTENDED RELEASE ORAL at 08:13

## 2024-02-17 RX ADMIN — LISINOPRIL 20 MG: 20 TABLET ORAL at 08:13

## 2024-02-17 RX ADMIN — RISPERIDONE 3 MG: 1 TABLET ORAL at 08:13

## 2024-02-17 RX ADMIN — Medication: at 08:14

## 2024-02-17 RX ADMIN — Medication 1 APPLICATION: at 17:15

## 2024-02-17 RX ADMIN — PANTOPRAZOLE SODIUM 20 MG: 20 TABLET, DELAYED RELEASE ORAL at 06:21

## 2024-02-17 NOTE — NURSING NOTE
Patient is calm and cooperative upon assessment. She reports feeling tired this morning but has been sleeping well. She is up and ambulatory without assistance. She denies pain. She is compliant with her scheduled medications. Her appetite is good. She reports mild depression and moderate anxiety. No behaviors displayed thus far this morning but she does remain in paper scrubs. Denies Si/Hi and hallucinations at this time. She is able to make her needs known. Plan of care continues. Q7 minute safety checks in progress.

## 2024-02-17 NOTE — PLAN OF CARE
Problem: Ineffective Coping  Goal: Participates in unit activities  Description: Interventions:  - Provide therapeutic environment   - Provide required programming   - Redirect inappropriate behaviors   Outcome: Progressing     Problem: Ineffective Coping  Goal: Patient/Family verbalizes awareness of resources  Outcome: Progressing     Problem: Ineffective Coping  Goal: Understands least restrictive measures  Description: Interventions:  - Utilize least restrictive behavior  Outcome: Progressing     Problem: Ineffective Coping  Goal: Free from restraint events  Description: - Utilize least restrictive measures   - Provide behavioral interventions   - Redirect inappropriate behaviors   Outcome: Progressing     Problem: Risk for Self Injury/Neglect  Goal: Verbalize thoughts and feelings  Description: Interventions:  - Assess and re-assess patient's lethality and potential for self-injury  - Engage patient in 1:1 interactions, daily, for a minimum of 15 minutes  - Encourage patient to express feelings, fears, frustrations, hopes  - Establish rapport/trust with patient   Outcome: Progressing     Problem: Risk for Self Injury/Neglect  Goal: Refrain from harming self  Description: Interventions:  - Monitor patient closely, per order  - Develop a trusting relationship  - Supervise medication ingestion, monitor effects and side effects   Outcome: Progressing     Problem: Risk for Self Injury/Neglect  Goal: Attend and participate in unit activities, including therapeutic, recreational, and educational groups  Description: Interventions:  - Provide therapeutic and educational activities daily, encourage attendance and participation, and document same in the medical record  - Obtain collateral information, encourage visitation and family involvement in care   Outcome: Progressing     Problem: Depression  Goal: Verbalize thoughts and feelings  Description: Interventions:  - Assess and re-assess patient's level of risk   -  Engage patient in 1:1 interactions, daily, for a minimum of 15 minutes   - Encourage patient to express feelings, fears, frustrations, hopes   Outcome: Progressing     Problem: Depression  Goal: Refrain from harming self  Description: Interventions:  - Monitor patient closely, per order   - Supervise medication ingestion, monitor effects and side effects   Outcome: Progressing     Problem: Depression  Goal: Refrain from isolation  Description: Interventions:  - Develop a trusting relationship   - Encourage socialization   Outcome: Progressing     Problem: Depression  Goal: Attend and participate in unit activities, including therapeutic, recreational, and educational groups  Description: Interventions:  - Provide therapeutic and educational activities daily, encourage attendance and participation, and document same in the medical record   Outcome: Progressing

## 2024-02-17 NOTE — PROGRESS NOTES
"Progress Note - Leydi Khan 54 y.o. female MRN: [id number]    Unit/Bed#: [facility] 203-01 Encounter: [id number]        Subjective:   Patient seen and examined at bedside after reviewing the chart and discussing the case with the caring staff.      Patient examined at bedside.  Patient denies any acute symptoms.    Physical Exam   Vitals: Blood pressure 122/70, pulse 81, temperature 98.4 °F (36.9 °C), temperature source Temporal, resp. rate 18, height 5' 6\" (1.676 m), weight 123 kg (271 lb), SpO2 95%.,Body mass index is 43.74 kg/m².  Constitutional: Patient in no acute distress.   HEENT: PERR, EOMI, MMM.  Cardiovascular: Normal rate and regular rhythm.    Pulmonary/Chest: Effort normal and breath sounds normal.   Abdomen: Soft, + BS, NT.    Assessment/Plan:  Leydi Khan is a(n) 54 y.o. year old female with MDD.     1.  Cardiac with hx of HTN, HLD.  Continue lisinopril 20 mg daily and atorvastatin 10 mg daily.  2.  Migraine headaches.  Ibuprofen as needed.  3.  Tobacco abuse.  Declines nicotine patch.  Nicotine gum as needed.  4.  Seizure disorder.  Patient is on Depakote ER 1000 mg at bedtime.  5.  DJD/OA/chronic low back pain.  Tylenol, lidocaine patch daily and Voltaren gel.  6.  GERD.  Patient is on Protonix 20 mg daily.  7.  Dry cracked skin bilateral heel.  Apply ammonium lactate 2 times daily.  8.  Vitamin D deficiency.  Patient started on vitamin D2 41591 units weekly for 12 weeks followed by vitamin D3 1000 units daily.    The patient was discussed with Dr. Jarquin and he is in agreement with the above note.  "

## 2024-02-17 NOTE — PROGRESS NOTES
"Progress Note - Behavioral Health     Leydi Khan 54 y.o. female MRN: [id number]   Unit/Bed#: [facility] 203-01 Encounter: [id number]    Behavior over the last 24 hours: unchanged.     Leydi was seen for continuation of care. She reports she \"does not want to be bothered today\" and is going to stay in bed. She is wearing paper clothes because she started to feel unsafe. Claims her depression and thoughts of self-harm are \"getting worse.\" Wants to start \"hitting her head.\" States she is taking medications and is not experiencing any side effects. Just having some mild hot flashes, which she thinks is menopausal. Denies HI or hallucinations, she is just hearing her in voice tell her that she \"is not good enough.\" Pt has been interacting with selective peers appropriately in the unit.    Sleep: improved  Appetite: fair  Medication side effects: denies  ROS: all other systems are negative, except for back pain    Mental Status Evaluation:    Appearance:  age appropriate, overweight, paper clothes   Behavior:  guarded   Speech:  normal rate, normal volume   Mood:  depressed, dysphoric   Affect:  constricted, mood-congruent   Thought Process:  goal directed   Associations: concrete associations   Thought Content:  negative thoughts   Perceptual Disturbances: denies auditory or visual hallucinations when asked    Risk Potential: Suicidal ideation - Yes, with plan to hit her head, would talk to staff if not feeling safe on the unit  Homicidal ideation - None   Sensorium:  oriented to person, place, and time/date   Memory:  recent and remote memory grossly intact   Consciousness:  alert and awake   Attention: attention span and concentration are age appropriate   Insight:  poor   Judgment: limited   Gait/Station: in bed   Motor Activity: no abnormal movements     Vital signs in last 24 hours:    Temp:  [97.6 °F (36.4 °C)-98 °F (36.7 °C)] 97.8 °F (36.6 °C)  HR:  [78-89] 89  Resp:  [16-18] 16  BP: (124-135)/(60-71) " 135/61    Laboratory results: I have personally reviewed all pertinent laboratory/tests results.  Most Recent Labs:   Lab Results   Component Value Date    WBC 8.24 02/11/2024    RBC 4.15 02/11/2024    HGB 12.7 02/11/2024    HCT 38.1 02/11/2024     02/11/2024    RDW 14.6 02/11/2024    TOTANEUTABS 3.94 11/25/2023    NEUTROABS 5.36 02/11/2024    SODIUM 135 02/11/2024    K 4.1 02/11/2024    CL 98 02/11/2024    CO2 30 02/11/2024    BUN 14 02/11/2024    CREATININE 0.72 02/11/2024    GLUC 116 02/11/2024    GLUF 130 (H) 12/13/2022    CALCIUM 9.7 02/11/2024    AST 14 02/11/2024    ALT 15 02/11/2024    ALKPHOS 58 02/11/2024    TP 7.7 02/11/2024    ALB 4.1 02/11/2024    TBILI 0.47 02/11/2024    CHOLESTEROL 212 (H) 11/03/2023    HDL 42 (L) 11/03/2023    TRIG 223 (H) 11/03/2023    LDLCALC 125 (H) 11/03/2023    NONHDLC 170 11/03/2023    VALPROICTOT 46 (L) 02/15/2024    [id number] 2.768 02/11/2024    PREGUR negative 11/06/2022    PREGSERUM Negative 04/28/2022    RPR Non-Reactive 12/13/2022    HGBA1C 4.5 08/08/2023    EAG 82 08/08/2023       Assessment/Plan   Principal Problem:    MDD (major depressive disorder), recurrent, severe, with psychosis (HCC)  Active Problems:    Primary hypertension    Hyperlipidemia    Class 3 severe obesity due to excess calories without serious comorbidity in adult (HCC)    PTSD (post-traumatic stress disorder)    Seizures (HCC)    Recommended Treatment:     Planned medication and treatment changes:    All current active medications have been reviewed  Encourage group therapy, milieu therapy and occupational therapy  Behavioral Health checks every 7 minutes  Requires continued inpatient treatment due to chronic illness and high risk of decompensation if discharged before long term stability is achieved     Current Facility-Administered Medications   Medication Dose Route Frequency Provider Last Rate    acetaminophen  650 mg Oral Q4H PRN SHANI Hawk      acetaminophen  650 mg  Oral Q4H PRN Aditya Gabrielle-Anom, CRNP      acetaminophen  975 mg Oral Q6H PRN Sarah Mckay PA-C      ammonium lactate   Topical BID Sudhir Jarquin MD      atorvastatin  10 mg Oral HS Aditya Gabrielle-Anom, CRNP      benztropine  0.5 mg Oral Q4H PRN Max 6/day Aditya Gabrielle-Anom, CRNP      [START ON 4/30/2024] cholecalciferol  1,000 Units Oral Daily Sudhir Jarquin MD      desvenlafaxine succinate  100 mg Oral Daily Renato Mcmanus MD      Diclofenac Sodium  2 g Topical 4x Daily PRN Sarah Mckay PA-C      divalproex sodium  1,000 mg Oral HS Aditya Gabrielle-Anom, CRMANJINDER      ergocalciferol  50,000 Units Oral Weekly Sudhir Jarquin MD      hydrOXYzine HCL  25 mg Oral Q6H PRN Max 4/day Aditya Gabrielle-Anom, CRNP      ibuprofen  600 mg Oral Q6H PRN Sarah Mckay PA-C      lidocaine  1 patch Topical Daily Sarah Mckay PA-C      lisinopril  20 mg Oral Daily Aditya Gabrielle-Anom, CRNP      LORazepam  1 mg Intramuscular Q6H PRN Max 3/day Aditya Gabrielle-Anom, CRNP      LORazepam  0.5 mg Oral Q6H PRN Max 4/day Aditya Gabrielle-Anom, CRNP      LORazepam  1 mg Oral Q6H PRN Max 3/day Aditya Gabrielle-Anom, CRNP      melatonin  3 mg Oral HS [place] Gabrielle-Anom, CRNP      nicotine polacrilex  2 mg Oral Q2H PRN Sudhir Jarquin MD      OLANZapine  5 mg Intramuscular Q3H PRN Max 3/day Aditya Gabrielle-Anom, CRNP      OLANZapine  2.5 mg Oral Q4H PRN Max 6/day Aditya Gabrielle-Anom, CRNP      OLANZapine  5 mg Oral Q4H PRN Max 3/day Aditya Gabrielle-Anom, CRNP      OLANZapine  5 mg Oral Q3H PRN Max 3/day Aditya Gabrielle-Anom, CRNP      pantoprazole  20 mg Oral Early Morning Aditya Gabrielle-Anom, CRNP      prazosin  2 mg Oral HS Aditya Gabrielle-Anom, CRNP      risperiDONE  3 mg Oral BID Aditya Gabrielle-Anom, CRNP      traZODone  50 mg Oral HS PRN SHANI Hawk      traZODone  50 mg Oral HS Renato Mcmanus MD         Risks / Benefits of Treatment:    Risks, benefits, and possible side effects of medications explained to patient and patient verbalizes  understanding and agreement for treatment.    Counseling / Coordination of Care:    Patient's progress discussed with staff in treatment team meeting.  Medications, treatment progress and treatment plan reviewed with patient.  Supportive therapy provided to patient.  Group attendance encouraged.    Renato Mcmanus MD 02/17/24

## 2024-02-17 NOTE — NURSING NOTE
Pt remained in direct eyesight of nursing staff until bedtime. Pt stated she felt safe to sleep. Pt was irritable towards select peers. She needed redirection regarding threatening statements towards peer. Pt reports command auditory hallucinations telling her to hurt herself. Pt is pleasant and respectful towards staff. She was compliant with medications and ADL's.

## 2024-02-17 NOTE — NURSING NOTE
Presents with WDL affect,brightens upon approach:denies SI,HI<VH,admits to chronic,command AH to harm self.Leydi states she will inform staff when thoughts become over whelming or prior to self harm.She is visible ,converses with peers and staff,pleasant,cooperative,compliant with medications and unit rules.We discussed s/s of impending psychological decompensation and when to seek assistance.Will continue to educate,monitor,and provide safe,therapeutic milieu.

## 2024-02-17 NOTE — NURSING NOTE
Patient has been observed sleeping on the majority of Q7 minute rounds.  Patient shows no s/s of distress.  Non-labored breathing.  Monitoring continues.

## 2024-02-18 PROCEDURE — 99232 SBSQ HOSP IP/OBS MODERATE 35: CPT | Performed by: PSYCHIATRY & NEUROLOGY

## 2024-02-18 RX ORDER — LIDOCAINE 50 MG/G
1 PATCH TOPICAL DAILY PRN
Status: DISCONTINUED | OUTPATIENT
Start: 2024-02-18 | End: 2024-02-22 | Stop reason: HOSPADM

## 2024-02-18 RX ADMIN — PANTOPRAZOLE SODIUM 20 MG: 20 TABLET, DELAYED RELEASE ORAL at 06:20

## 2024-02-18 RX ADMIN — TRAZODONE HYDROCHLORIDE 50 MG: 50 TABLET ORAL at 21:01

## 2024-02-18 RX ADMIN — DESVENLAFAXINE 100 MG: 50 TABLET, FILM COATED, EXTENDED RELEASE ORAL at 08:15

## 2024-02-18 RX ADMIN — Medication: at 08:15

## 2024-02-18 RX ADMIN — LISINOPRIL 20 MG: 20 TABLET ORAL at 08:15

## 2024-02-18 RX ADMIN — Medication 1 APPLICATION: at 17:12

## 2024-02-18 RX ADMIN — Medication 3 MG: at 21:01

## 2024-02-18 RX ADMIN — RISPERIDONE 3 MG: 1 TABLET ORAL at 08:15

## 2024-02-18 RX ADMIN — PRAZOSIN HYDROCHLORIDE 2 MG: 1 CAPSULE ORAL at 21:00

## 2024-02-18 RX ADMIN — DIVALPROEX SODIUM 1000 MG: 500 TABLET, DELAYED RELEASE ORAL at 21:01

## 2024-02-18 RX ADMIN — RISPERIDONE 3 MG: 1 TABLET ORAL at 21:01

## 2024-02-18 RX ADMIN — OLANZAPINE 5 MG: 5 TABLET, FILM COATED ORAL at 15:42

## 2024-02-18 RX ADMIN — ATORVASTATIN CALCIUM 10 MG: 10 TABLET, FILM COATED ORAL at 21:01

## 2024-02-18 NOTE — PROGRESS NOTES
"Progress Note - Leydi Khan 54 y.o. female MRN: [id number]    Unit/Bed#: [facility] 203-01 Encounter: [id number]        Subjective:   Patient seen and examined at bedside after reviewing the chart and discussing the case with the caring staff.      Patient examined at bedside.  Patient denies any acute symptoms.    Physical Exam   Vitals: Blood pressure 150/78, pulse 80, temperature 98 °F (36.7 °C), temperature source Temporal, resp. rate 18, height 5' 6\" (1.676 m), weight 123 kg (271 lb), SpO2 95%.,Body mass index is 43.74 kg/m².  Constitutional: Patient in no acute distress.   HEENT: PERR, EOMI, MMM.  Cardiovascular: Normal rate and regular rhythm.    Pulmonary/Chest: Effort normal and breath sounds normal.   Abdomen: Soft, + BS, NT.    Assessment/Plan:  Leydi Khan is a(n) 54 y.o. year old female with MDD.     1.  Cardiac with hx of HTN, HLD.  Continue lisinopril 20 mg daily and atorvastatin 10 mg daily.  2.  Migraine headaches.  Ibuprofen as needed.  3.  Tobacco abuse.  Declines nicotine patch.  Nicotine gum as needed.  4.  Seizure disorder.  Patient is on Depakote ER 1000 mg at bedtime.  5.  DJD/OA/chronic low back pain.  Tylenol, lidocaine patch daily and Voltaren gel.  6.  GERD.  Patient is on Protonix 20 mg daily.  7.  Dry cracked skin bilateral heel.  Apply ammonium lactate 2 times daily.  8.  Vitamin D deficiency.  Patient started on vitamin D2 27408 units weekly for 12 weeks followed by vitamin D3 1000 units daily.    The patient was discussed with Dr. Jarquin and he is in agreement with the above note.  "

## 2024-02-18 NOTE — NURSING NOTE
"Post prn zyprexa,Leydi states she is not annoyed or irritated 'that much.\" No c/o SE or s/s thereof.Will continue to monitor.  "

## 2024-02-18 NOTE — NURSING NOTE
"Presents anxious,irritable,states she is 'very annoyed\" with patients trying to talk to her and the aaaaAH telling her to harm herself:placed in close proximity to nurses station,visible,offered and accepted 5 mg po zyprexa with education on exp[ected therapeutics and SE to report.She endorses high anxiety/depression/irritation,and AH,denies VH.Leydi is visible on the unit,makes needs known.We discussed ways to increase coping skills.Will continue to educate,monitor,and provide safe,therapeutic milieu.  "

## 2024-02-18 NOTE — NURSING NOTE
Upon assessment pt is bright and social with staff. She reports sleeping well last night. She has been compliant with her scheduled medications. Her appetite is good- 100% of meals. She denies any current command auditory hallucinations. She also reports feeling safe although she does remain in paper scrubs. She has been up and ambulatory without assistance. She is able to make her needs known and offers no current complaints/ concerns. Plan of care continues. Q7 minute safety checks in progress.

## 2024-02-18 NOTE — NURSING NOTE
BLEPS assessment has been completed.  Bowel- Last BM 2/18/24  Lungs- Clear  Extremities- WDL  Pulses- Palpable  Skin- Pt's skin is warm, dry, intact. She does has some dryness to her b/l heels- ammonium lactate applied BID.

## 2024-02-18 NOTE — PROGRESS NOTES
"Progress Note - Behavioral Health     Leydi Khan 54 y.o. female MRN: [id number]   Unit/Bed#: [facility] 203-01 Encounter: [id number]    Behavior over the last 24 hours: minimal improvement.     Leydi was seen for continuation of care. She reports doing \" ok\" and she is not having thoughts of self-harm this morning. Continue verbalizing vague auditory hallucinations making derogatory comment. She slept better last night and states her anxiety is slightly reduced this morning. Denies active plan or intent to hurt herself in the unit but states she is not not ready to be discharge. She is taking medications and is not experiencing any side effects. Pt has been interacting with selective peers appropriately in the unit.    Sleep: slept better  Appetite: fair  Medication side effects: denies  ROS: all other systems are negative, except for back pain    Mental Status Evaluation:    Appearance:  age appropriate, overweight   Behavior:  slightly more redirectable   Speech:  normal rate, normal volume   Mood:  depressed, anxious   Affect:  constricted, mood-congruent   Thought Process:  goal directed   Associations: concrete associations   Thought Content:  negative thoughts   Perceptual Disturbances: auditory hallucinations with derogatory comments    Risk Potential: Suicidal ideation - None at present, but would not feel safe if discharged today  Homicidal ideation - None   Sensorium:  oriented to person, place, and time/date   Memory:  recent and remote memory grossly intact   Consciousness:  alert and awake   Attention: attention span and concentration are age appropriate   Insight:  poor   Judgment: limited   Gait/Station: normal gait/station   Motor Activity: no abnormal movements     Vital signs in last 24 hours:    Temp:  [97.6 °F (36.4 °C)-98 °F (36.7 °C)] 97.6 °F (36.4 °C)  HR:  [80-84] 84  Resp:  [18] 18  BP: (105-150)/(64-78) 105/64    Laboratory results: I have personally reviewed all pertinent laboratory/tests " results.  Most Recent Labs:   Lab Results   Component Value Date    WBC 8.24 02/11/2024    RBC 4.15 02/11/2024    HGB 12.7 02/11/2024    HCT 38.1 02/11/2024     02/11/2024    RDW 14.6 02/11/2024    TOTANEUTABS 3.94 11/25/2023    NEUTROABS 5.36 02/11/2024    SODIUM 135 02/11/2024    K 4.1 02/11/2024    CL 98 02/11/2024    CO2 30 02/11/2024    BUN 14 02/11/2024    CREATININE 0.72 02/11/2024    GLUC 116 02/11/2024    GLUF 130 (H) 12/13/2022    CALCIUM 9.7 02/11/2024    AST 14 02/11/2024    ALT 15 02/11/2024    ALKPHOS 58 02/11/2024    TP 7.7 02/11/2024    ALB 4.1 02/11/2024    TBILI 0.47 02/11/2024    CHOLESTEROL 212 (H) 11/03/2023    HDL 42 (L) 11/03/2023    TRIG 223 (H) 11/03/2023    LDLCALC 125 (H) 11/03/2023    NONHDLC 170 11/03/2023    VALPROICTOT 46 (L) 02/15/2024    ECQ1RZHDPLZM 2.768 02/11/2024    PREGUR negative 11/06/2022    PREGSERUM Negative 04/28/2022    RPR Non-Reactive 12/13/2022    HGBA1C 4.5 08/08/2023    EAG 82 08/08/2023       Assessment/Plan   Principal Problem:    MDD (major depressive disorder), recurrent, severe, with psychosis (HCC)  Active Problems:    Primary hypertension    Hyperlipidemia    Class 3 severe obesity due to excess calories without serious comorbidity in adult (HCC)    PTSD (post-traumatic stress disorder)    Seizures (HCC)    Recommended Treatment:     Planned medication and treatment changes:    All current active medications have been reviewed  Encourage group therapy, milieu therapy and occupational therapy  Behavioral Health checks every 7 minutes  Requires continued inpatient treatment due to chronic illness and high risk of decompensation if discharged before long term stability is achieved     Current Facility-Administered Medications   Medication Dose Route Frequency Provider Last Rate    acetaminophen  650 mg Oral Q4H PRN SHANI Hawk      acetaminophen  650 mg Oral Q4H PRN ISABELL HawkNP      acetaminophen  975 mg Oral Q6H PRN Sarah SAENZ  Woody, JO      ammonium lactate   Topical BID Maldivian MD Britton      atorvastatin  10 mg Oral HS Carleton Gabrielle-Anom, CRNP      benztropine  0.5 mg Oral Q4H PRN Max 6/day Aditya Gabrielle-Anom, CRNP      [START ON 4/30/2024] cholecalciferol  1,000 Units Oral Daily Sudhir Jarquin MD      desvenlafaxine succinate  100 mg Oral Daily Renato Mcmanus MD      Diclofenac Sodium  2 g Topical 4x Daily PRN Sarah Mckya PA-C      divalproex sodium  1,000 mg Oral HS Carleton Gabrielle-Anom, CRNP      ergocalciferol  50,000 Units Oral Weekly Sudhir Jarquin MD      hydrOXYzine HCL  25 mg Oral Q6H PRN Max 4/day Carleton Gabrielle-Anom, CRNP      ibuprofen  600 mg Oral Q6H PRN Sarah Mckay PA-C      lidocaine  1 patch Topical Daily PRN Sarah Mckay PA-C      lisinopril  20 mg Oral Daily Carleton Gabrielle-Anom, CRNP      LORazepam  1 mg Intramuscular Q6H PRN Max 3/day Carleton Gabrielle-Anom, CRNP      LORazepam  0.5 mg Oral Q6H PRN Max 4/day Carleton Gabrielle-Anom, CRNP      LORazepam  1 mg Oral Q6H PRN Max 3/day Carleton Gabrielle-Anom, CRNP      melatonin  3 mg Oral HS Carleton Gabrielle-Anom, CRNP      nicotine polacrilex  2 mg Oral Q2H PRN Sudhir Jarquin MD      OLANZapine  5 mg Intramuscular Q3H PRN Max 3/day Carleton Gabrielle-Anom, CRNP      OLANZapine  2.5 mg Oral Q4H PRN Max 6/day Carleton Gabrielle-Anom, CRNP      OLANZapine  5 mg Oral Q4H PRN Max 3/day Carleton Gabrielle-Anom, CRNP      OLANZapine  5 mg Oral Q3H PRN Max 3/day Carleton Gabrielle-Anom, CRNP      pantoprazole  20 mg Oral Early Morning Carleton Gabrielle-Anom, CRNP      prazosin  2 mg Oral HS Carleton Gabrielle-Anom, CRNP      risperiDONE  3 mg Oral BID Carleton Gabrielle-Anom, CRNP      traZODone  50 mg Oral HS PRN Carleton Gabrielle-Anom, CRNP      traZODone  50 mg Oral HS Renato Mcmanus MD         Risks / Benefits of Treatment:    Risks, benefits, and possible side effects of medications explained to patient and patient verbalizes understanding and agreement for treatment.    Counseling / Coordination of  Care:    Patient's progress discussed with staff in treatment team meeting.  Medications, treatment progress and treatment plan reviewed with patient.  Supportive therapy provided to patient.  Group attendance encouraged.    Renato Mcmanus MD 02/18/24

## 2024-02-18 NOTE — PLAN OF CARE
Problem: Ineffective Coping  Goal: Participates in unit activities  Description: Interventions:  - Provide therapeutic environment   - Provide required programming   - Redirect inappropriate behaviors   Outcome: Progressing     Problem: Ineffective Coping  Goal: Understands least restrictive measures  Description: Interventions:  - Utilize least restrictive behavior  Outcome: Progressing     Problem: Ineffective Coping  Goal: Free from restraint events  Description: - Utilize least restrictive measures   - Provide behavioral interventions   - Redirect inappropriate behaviors   Outcome: Progressing     Problem: Risk for Self Injury/Neglect  Goal: Verbalize thoughts and feelings  Description: Interventions:  - Assess and re-assess patient's lethality and potential for self-injury  - Engage patient in 1:1 interactions, daily, for a minimum of 15 minutes  - Encourage patient to express feelings, fears, frustrations, hopes  - Establish rapport/trust with patient   Outcome: Progressing     Problem: Risk for Self Injury/Neglect  Goal: Refrain from harming self  Description: Interventions:  - Monitor patient closely, per order  - Develop a trusting relationship  - Supervise medication ingestion, monitor effects and side effects   Outcome: Progressing     Problem: Depression  Goal: Verbalize thoughts and feelings  Description: Interventions:  - Assess and re-assess patient's level of risk   - Engage patient in 1:1 interactions, daily, for a minimum of 15 minutes   - Encourage patient to express feelings, fears, frustrations, hopes   Outcome: Progressing     Problem: Depression  Goal: Refrain from harming self  Description: Interventions:  - Monitor patient closely, per order   - Supervise medication ingestion, monitor effects and side effects   Outcome: Progressing     Problem: Depression  Goal: Refrain from isolation  Description: Interventions:  - Develop a trusting relationship   - Encourage socialization   Outcome:  Progressing     Problem: Depression  Goal: Refrain from self-neglect  Outcome: Progressing

## 2024-02-19 PROCEDURE — 99232 SBSQ HOSP IP/OBS MODERATE 35: CPT | Performed by: PSYCHIATRY & NEUROLOGY

## 2024-02-19 RX ORDER — PANTOPRAZOLE SODIUM 20 MG/1
20 TABLET, DELAYED RELEASE ORAL DAILY
Qty: 30 TABLET | Refills: 0 | Status: SHIPPED | OUTPATIENT
Start: 2024-02-19

## 2024-02-19 RX ORDER — ACETAMINOPHEN 325 MG/1
650 TABLET ORAL EVERY 4 HOURS PRN
Qty: 90 TABLET | Refills: 0 | Status: SHIPPED | OUTPATIENT
Start: 2024-02-19

## 2024-02-19 RX ORDER — LISINOPRIL 20 MG/1
20 TABLET ORAL DAILY
Qty: 30 TABLET | Refills: 0 | Status: SHIPPED | OUTPATIENT
Start: 2024-02-19

## 2024-02-19 RX ORDER — MECLIZINE HCL 12.5 MG/1
12.5 TABLET ORAL EVERY 8 HOURS PRN
Status: DISCONTINUED | OUTPATIENT
Start: 2024-02-19 | End: 2024-02-22 | Stop reason: HOSPADM

## 2024-02-19 RX ORDER — PRAZOSIN HYDROCHLORIDE 2 MG/1
2 CAPSULE ORAL
Qty: 30 CAPSULE | Refills: 0 | Status: SHIPPED | OUTPATIENT
Start: 2024-02-19 | End: 2024-03-20

## 2024-02-19 RX ORDER — IBUPROFEN 600 MG/1
600 TABLET ORAL EVERY 6 HOURS PRN
Qty: 60 TABLET | Refills: 0 | Status: SHIPPED | OUTPATIENT
Start: 2024-02-19

## 2024-02-19 RX ORDER — MELATONIN
1000 DAILY
Qty: 30 TABLET | Refills: 0 | Status: SHIPPED | OUTPATIENT
Start: 2024-04-30

## 2024-02-19 RX ORDER — AMMONIUM LACTATE 12 G/100G
LOTION TOPICAL 2 TIMES DAILY
Qty: 225 G | Refills: 0 | Status: SHIPPED | OUTPATIENT
Start: 2024-02-19

## 2024-02-19 RX ORDER — MECLIZINE HCL 12.5 MG/1
12.5 TABLET ORAL EVERY 8 HOURS PRN
Qty: 30 TABLET | Refills: 0 | Status: SHIPPED | OUTPATIENT
Start: 2024-02-19

## 2024-02-19 RX ORDER — ERGOCALCIFEROL 1.25 MG/1
50000 CAPSULE ORAL WEEKLY
Qty: 10 CAPSULE | Refills: 0 | Status: SHIPPED | OUTPATIENT
Start: 2024-02-20 | End: 2024-05-01

## 2024-02-19 RX ORDER — GABAPENTIN 100 MG/1
100 CAPSULE ORAL 3 TIMES DAILY
Status: DISCONTINUED | OUTPATIENT
Start: 2024-02-19 | End: 2024-02-22 | Stop reason: HOSPADM

## 2024-02-19 RX ORDER — LIDOCAINE 50 MG/G
1 PATCH TOPICAL DAILY PRN
Qty: 30 PATCH | Refills: 0 | Status: SHIPPED | OUTPATIENT
Start: 2024-02-19

## 2024-02-19 RX ORDER — ATORVASTATIN CALCIUM 10 MG/1
10 TABLET, FILM COATED ORAL
Qty: 30 TABLET | Refills: 0 | Status: SHIPPED | OUTPATIENT
Start: 2024-02-19

## 2024-02-19 RX ADMIN — RISPERIDONE 3 MG: 1 TABLET ORAL at 20:43

## 2024-02-19 RX ADMIN — TRAZODONE HYDROCHLORIDE 50 MG: 50 TABLET ORAL at 20:43

## 2024-02-19 RX ADMIN — PANTOPRAZOLE SODIUM 20 MG: 20 TABLET, DELAYED RELEASE ORAL at 05:51

## 2024-02-19 RX ADMIN — LISINOPRIL 20 MG: 20 TABLET ORAL at 08:12

## 2024-02-19 RX ADMIN — GABAPENTIN 100 MG: 100 CAPSULE ORAL at 12:06

## 2024-02-19 RX ADMIN — DESVENLAFAXINE 100 MG: 50 TABLET, FILM COATED, EXTENDED RELEASE ORAL at 08:12

## 2024-02-19 RX ADMIN — GABAPENTIN 100 MG: 100 CAPSULE ORAL at 20:48

## 2024-02-19 RX ADMIN — HYDROXYZINE HYDROCHLORIDE 25 MG: 25 TABLET ORAL at 08:12

## 2024-02-19 RX ADMIN — PRAZOSIN HYDROCHLORIDE 2 MG: 1 CAPSULE ORAL at 20:43

## 2024-02-19 RX ADMIN — GABAPENTIN 100 MG: 100 CAPSULE ORAL at 17:23

## 2024-02-19 RX ADMIN — Medication 3 MG: at 20:43

## 2024-02-19 RX ADMIN — DIVALPROEX SODIUM 1000 MG: 500 TABLET, DELAYED RELEASE ORAL at 20:43

## 2024-02-19 RX ADMIN — MECLIZINE 12.5 MG: 12.5 TABLET ORAL at 16:08

## 2024-02-19 RX ADMIN — RISPERIDONE 3 MG: 1 TABLET ORAL at 08:12

## 2024-02-19 RX ADMIN — ATORVASTATIN CALCIUM 10 MG: 10 TABLET, FILM COATED ORAL at 20:43

## 2024-02-19 NOTE — PROGRESS NOTES
"Progress Note - Leydi Khan 54 y.o. female MRN: [id number]    Unit/Bed#: [facility] 203-01 Encounter: [id number]        Subjective:   Patient seen and examined at bedside after reviewing the chart and discussing the case with the caring staff.      Patient examined at bedside.  Patient reports that she has bilateral wrist and arm numbness and tingling.    Patient is being discharged on Thursday, 2/22/2024.  Patient is requesting prescriptions.  I reviewed and reconciled patient's problem list and medications.    Physical Exam   Vitals: Blood pressure 123/60, pulse 81, temperature 97.7 °F (36.5 °C), temperature source Temporal, resp. rate 18, height 5' 6\" (1.676 m), weight 123 kg (271 lb), SpO2 94%.,Body mass index is 43.74 kg/m².  Constitutional: Patient in no acute distress.   HEENT: PERR, EOMI, MMM.  Cardiovascular: Normal rate and regular rhythm.    Pulmonary/Chest: Effort normal and breath sounds normal.   Abdomen: Soft, + BS, NT.    Assessment/Plan:  Leydi Khan is a(n) 54 y.o. year old female with MDD.     Medical clearance.  Patient is medically cleared for discharge.  All scripts will be sent out for the patient.    1.  Cardiac with hx of HTN, HLD.  Continue lisinopril 20 mg daily and atorvastatin 10 mg daily.  2.  Migraine headaches.  Ibuprofen as needed.  3.  Tobacco abuse.  Declines nicotine patch.  Nicotine gum as needed.  4.  Seizure disorder.  Patient is on Depakote ER 1000 mg at bedtime.  5.  DJD/OA/chronic low back pain.  Tylenol, lidocaine patch daily and Voltaren gel.  6.  GERD.  Patient is on Protonix 20 mg daily.  7.  Dry cracked skin bilateral heel.  Apply ammonium lactate 2 times daily.  8.  Vitamin D deficiency.  Patient started on vitamin D2 87469 units weekly for 12 weeks followed by vitamin D3 1000 units daily.  9.  Neuropathy involving bilateral wrist and arm.  I will put the patient on gabapentin 100 mg 3 times daily 2/19/2024.  "

## 2024-02-19 NOTE — NURSING NOTE
Pt was in group therapy and reported feeling dizzy. RN obtained /58 HR 80. Pt walked back to her room with assistance to lay down a bit. Pt reports feeling safe at this time, she contracted for safety. Pt remains in paper scrubs and bed is stripped currently. Pt is offers paper sheet to which she declined at this time. Q 7 min safety checks maintained. Pt told to ring bell if she starts feeling a decline or attempts to get up.

## 2024-02-19 NOTE — NURSING NOTE
Patient C/O dizziness, MD aware, new order for Antivert. PRN Antivert given with positive effect, no further dizziness noted.

## 2024-02-19 NOTE — NURSING NOTE
Pt visible on milieu throughout majority of the shift, interacting with select peers. Med and meal compliant without prompting required. Attending groups with appropriate participation. Pt reports severe depression and fleeting SI to 'bang head' or 'strangle herself' and all linens were removed from room, remains in paper scrubs for safety. Plan to discharge back to group home Thursday. Able to express needs and concerns, independent with ADLs. Maintain safety rounds as ordered.

## 2024-02-19 NOTE — SOCIAL WORK
02/19/24 930   Team Meeting   Meeting Type Daily Rounds   Team Members Present   Team Members Present Physician;Nurse;;Occupational Therapist   Physician Team Member MD Aditya Valdovinos CRNP   Nursing Team Member Shira Glez RN   Care Management Team Member BRITT Iqbal   OT Team Member ROSETTE Trinidad   Patient/Family Present   Patient Present No   Patient's Family Present No   Pt to return to group home with f/u at [facility], slept, papers, only crayons no pencils, endorses si with plan to bang head,

## 2024-02-19 NOTE — PROGRESS NOTES
"Progress Note - Behavioral Health     Leydi Khan 54 y.o. female MRN: [id number]   Unit/Bed#: [facility] 203-01 Encounter: [id number]    Behavior over the last 24 hours: limited improvement.     Leydi was seen for continuation of care. She appears less anxious this morning and  able to engaged in short conversation. Still reports she is \"not doing well\", feeling depressed and that she is having thoughts of self-harm periodically. Denies any current AH and her sleep was better last night. States she is taking medications and is not experiencing any side effects.  Pt was asked for possible day for her discharge this week, reluctantly states Thursday, then she became angry and dismissive. Denies any physical compliant and calorie intake is stable.      Sleep: slept better  Appetite: fair  Medication side effects: denies  ROS: all other systems are negative, except for back pain    Mental Status Evaluation:    Appearance:  age appropriate, overweight   Behavior:  cooperative initially   Speech:  normal rate, normal volume   Mood:  dysphoric   Affect:  constricted, mood-congruent   Thought Process:  goal directed   Associations: concrete associations   Thought Content:  negative thoughts   Perceptual Disturbances: denies auditory hallucinations when asked, appears preoccupied.    Risk Potential: Suicidal ideation - Yes, without plan, contracts for safety on the unit  Homicidal ideation - None   Sensorium:  oriented to person, place, and time/date   Memory:  recent and remote memory grossly intact   Consciousness:  alert and awake   Attention: attention span and concentration are age appropriate   Insight:  poor   Judgment: limited   Gait/Station: in bed   Motor Activity: no abnormal movements     Vital signs in last 24 hours:    Temp:  [97.6 °F (36.4 °C)-97.7 °F (36.5 °C)] 97.7 °F (36.5 °C)  HR:  [81-84] 81  Resp:  [18] 18  BP: (105-123)/(60-68) 123/60    Laboratory results: I have personally reviewed all pertinent " laboratory/tests results.  Most Recent Labs:   Lab Results   Component Value Date    WBC 8.24 02/11/2024    RBC 4.15 02/11/2024    HGB 12.7 02/11/2024    HCT 38.1 02/11/2024     02/11/2024    RDW 14.6 02/11/2024    TOTANEUTABS 3.94 11/25/2023    NEUTROABS 5.36 02/11/2024    SODIUM 135 02/11/2024    K 4.1 02/11/2024    CL 98 02/11/2024    CO2 30 02/11/2024    BUN 14 02/11/2024    CREATININE 0.72 02/11/2024    GLUC 116 02/11/2024    GLUF 130 (H) 12/13/2022    CALCIUM 9.7 02/11/2024    AST 14 02/11/2024    ALT 15 02/11/2024    ALKPHOS 58 02/11/2024    TP 7.7 02/11/2024    ALB 4.1 02/11/2024    TBILI 0.47 02/11/2024    CHOLESTEROL 212 (H) 11/03/2023    HDL 42 (L) 11/03/2023    TRIG 223 (H) 11/03/2023    LDLCALC 125 (H) 11/03/2023    NONHDLC 170 11/03/2023    VALPROICTOT 46 (L) 02/15/2024    [id number] 2.768 02/11/2024    PREGUR negative 11/06/2022    PREGSERUM Negative 04/28/2022    RPR Non-Reactive 12/13/2022    HGBA1C 4.5 08/08/2023    EAG 82 08/08/2023       Assessment/Plan   Principal Problem:    MDD (major depressive disorder), recurrent, severe, with psychosis (HCC)  Active Problems:    Primary hypertension    Hyperlipidemia    Class 3 severe obesity due to excess calories without serious comorbidity in adult (HCC)    PTSD (post-traumatic stress disorder)    Seizures (HCC)    Recommended Treatment:     Planned medication and treatment changes:    All current active medications have been reviewed  Encourage group therapy, milieu therapy and occupational therapy  Behavioral Health checks every 7 minutes  Requires continued inpatient treatment due to chronic illness and high risk of decompensation if discharged before long term stability is achieved     Current Facility-Administered Medications   Medication Dose Route Frequency Provider Last Rate    acetaminophen  650 mg Oral Q4H PRN SHANI Hawk      acetaminophen  650 mg Oral Q4H PRN SHANI Hawk      acetaminophen  975 mg Oral Q6H  PRN Sarah Mckay PANormaC      ammonium lactate   Topical BID Sudhir Jarquin MD      atorvastatin  10 mg Oral HS Aditya Gabrielle-Anom, CRNP      benztropine  0.5 mg Oral Q4H PRN Max 6/day Aditya Gabrielle-Anom, CRNP      [START ON 4/30/2024] cholecalciferol  1,000 Units Oral Daily Sudhir Jarquin MD      desvenlafaxine succinate  100 mg Oral Daily Renato Mcmanus MD      Diclofenac Sodium  2 g Topical 4x Daily PRN JULIA TsangC      divalproex sodium  1,000 mg Oral HS Aditya Gabrielle-Anom, CRNP      ergocalciferol  50,000 Units Oral Weekly Sudhir Jarquin MD      gabapentin  100 mg Oral TID Sudhir Jarquin MD      hydrOXYzine HCL  25 mg Oral Q6H PRN Max 4/day Aditya Gabrielle-Anom, CRNP      ibuprofen  600 mg Oral Q6H PRN Sarah Mckay PA-C      lidocaine  1 patch Topical Daily PRN JULIA TsangC      lisinopril  20 mg Oral Daily Aditya Gabrielle-Anom, CRNP      LORazepam  1 mg Intramuscular Q6H PRN Max 3/day Aditya Gabrielle-Anom, CRNP      LORazepam  0.5 mg Oral Q6H PRN Max 4/day Aditya Gabrielle-Anom, CRNP      LORazepam  1 mg Oral Q6H PRN Max 3/day Aditya Gabrielle-Anom, CRNP      melatonin  3 mg Oral HS [place] Gabrielle-Anom, CRNP      nicotine polacrilex  2 mg Oral Q2H PRN Sudhir Jarquin MD      OLANZapine  5 mg Intramuscular Q3H PRN Max 3/day Aditya Gabrielle-Anom, CRNP      OLANZapine  2.5 mg Oral Q4H PRN Max 6/day Aditya Gabrielle-Anom, CRNP      OLANZapine  5 mg Oral Q4H PRN Max 3/day Aditya Gabrielle-Anom, CRNP      OLANZapine  5 mg Oral Q3H PRN Max 3/day Aditya Gabrielle-Anom, CRNP      pantoprazole  20 mg Oral Early Morning Aditya Gabrielle-Anom, CRNP      prazosin  2 mg Oral HS [place] Gabrielle-Anom, CRNP      risperiDONE  3 mg Oral BID [place] Gabrielle-Anom, CRNP      traZODone  50 mg Oral HS PRN SHANI Hawk      traZODone  50 mg Oral HS Renato Mcmanus MD         Risks / Benefits of Treatment:    Risks, benefits, and possible side effects of medications explained to patient and patient verbalizes understanding and  agreement for treatment.    Counseling / Coordination of Care:    Patient's progress discussed with staff in treatment team meeting.  Medications, treatment progress and treatment plan reviewed with patient.  Supportive therapy provided to patient.  Group attendance encouraged.    Renato Mcmanus MD 02/19/24

## 2024-02-19 NOTE — PROGRESS NOTES
02/19/24 1100   Activity/Group Checklist   Group Admission/Discharge  (Relapse Prevention Plan)   Attendance Attended   Attendance Duration (min) Greater than 60   Interactions Interacted appropriately   Affect/Mood Appropriate   Goals Achieved Identified feelings;Identified triggers;Discussed coping strategies;Discussed discharge plans;Identified resources and support systems;Able to listen to others;Able to engage in interactions;Able to reflect/comment on own behavior;Able to manage/cope with feelings;Able to self-disclose;Able to recieve feedback     Patient has been joining group and completing relapse prevention plan with [facility]. She has coping skills and going to reach out to her supports daily.

## 2024-02-19 NOTE — PLAN OF CARE
Problem: Ineffective Coping  Goal: Participates in unit activities  Description: Interventions:  - Provide therapeutic environment   - Provide required programming   - Redirect inappropriate behaviors   Outcome: Progressing     Problem: Risk for Self Injury/Neglect  Goal: Attend and participate in unit activities, including therapeutic, recreational, and educational groups  Description: Interventions:  - Provide therapeutic and educational activities daily, encourage attendance and participation, and document same in the medical record  - Obtain collateral information, encourage visitation and family involvement in care   Outcome: Progressing     Problem: Depression  Goal: Attend and participate in unit activities, including therapeutic, recreational, and educational groups  Description: Interventions:  - Provide therapeutic and educational activities daily, encourage attendance and participation, and document same in the medical record   Outcome: Progressing

## 2024-02-20 LAB
BACTERIA UR QL AUTO: ABNORMAL /HPF
BILIRUB UR QL STRIP: NEGATIVE
CLARITY UR: CLEAR
COLOR UR: YELLOW
GLUCOSE UR STRIP-MCNC: NEGATIVE MG/DL
HGB UR QL STRIP.AUTO: NEGATIVE
KETONES UR STRIP-MCNC: NEGATIVE MG/DL
LEUKOCYTE ESTERASE UR QL STRIP: ABNORMAL
NITRITE UR QL STRIP: NEGATIVE
NON-SQ EPI CELLS URNS QL MICRO: ABNORMAL /HPF
PH UR STRIP.AUTO: 7 [PH]
PROT UR STRIP-MCNC: NEGATIVE MG/DL
RBC #/AREA URNS AUTO: ABNORMAL /HPF
SP GR UR STRIP.AUTO: 1.01
UROBILINOGEN UR QL STRIP.AUTO: 1 E.U./DL
WBC #/AREA URNS AUTO: ABNORMAL /HPF

## 2024-02-20 PROCEDURE — 81001 URINALYSIS AUTO W/SCOPE: CPT | Performed by: FAMILY MEDICINE

## 2024-02-20 PROCEDURE — 99232 SBSQ HOSP IP/OBS MODERATE 35: CPT | Performed by: PSYCHIATRY & NEUROLOGY

## 2024-02-20 PROCEDURE — 81003 URINALYSIS AUTO W/O SCOPE: CPT | Performed by: FAMILY MEDICINE

## 2024-02-20 RX ADMIN — Medication 3 MG: at 21:29

## 2024-02-20 RX ADMIN — LISINOPRIL 20 MG: 20 TABLET ORAL at 08:19

## 2024-02-20 RX ADMIN — GABAPENTIN 100 MG: 100 CAPSULE ORAL at 08:19

## 2024-02-20 RX ADMIN — DESVENLAFAXINE 100 MG: 50 TABLET, FILM COATED, EXTENDED RELEASE ORAL at 08:19

## 2024-02-20 RX ADMIN — Medication: at 19:19

## 2024-02-20 RX ADMIN — RISPERIDONE 3 MG: 1 TABLET ORAL at 08:19

## 2024-02-20 RX ADMIN — GABAPENTIN 100 MG: 100 CAPSULE ORAL at 15:38

## 2024-02-20 RX ADMIN — PANTOPRAZOLE SODIUM 20 MG: 20 TABLET, DELAYED RELEASE ORAL at 07:07

## 2024-02-20 RX ADMIN — DIVALPROEX SODIUM 1000 MG: 500 TABLET, DELAYED RELEASE ORAL at 21:29

## 2024-02-20 RX ADMIN — TRAZODONE HYDROCHLORIDE 50 MG: 50 TABLET ORAL at 21:29

## 2024-02-20 RX ADMIN — ERGOCALCIFEROL 50000 UNITS: 1.25 CAPSULE, LIQUID FILLED ORAL at 08:19

## 2024-02-20 RX ADMIN — PRAZOSIN HYDROCHLORIDE 2 MG: 1 CAPSULE ORAL at 21:28

## 2024-02-20 RX ADMIN — RISPERIDONE 3 MG: 1 TABLET ORAL at 21:26

## 2024-02-20 RX ADMIN — Medication: at 08:20

## 2024-02-20 RX ADMIN — GABAPENTIN 100 MG: 100 CAPSULE ORAL at 21:29

## 2024-02-20 RX ADMIN — ATORVASTATIN CALCIUM 10 MG: 10 TABLET, FILM COATED ORAL at 21:27

## 2024-02-20 NOTE — SOCIAL WORK
CM received call from  primary care Nick at [facility] [phone]. Post d/c appt secured on 2/26 at 1:10pm in-person with Love Richmond NP.

## 2024-02-20 NOTE — SOCIAL WORK
"CM met with pt in the dining mcneill. Pt was socializing with peers. Pt stated that she was doing \"ok\". Pt denies si/hi, endorsed anx and dep and command AH. Pt stated that she wanted to bang her head at times however assured cm that she would not. CM provided reassurance and encouragement.    "

## 2024-02-20 NOTE — PROGRESS NOTES
"Progress Note - Leydi Khan 54 y.o. female MRN: [id number]    Unit/Bed#: [facility] 203-01 Encounter: [id number]        Subjective:   Patient seen and examined at bedside after reviewing the chart and discussing the case with the caring staff.      Patient examined at bedside.  Patient planing of feeling dizzy and requesting something for it.  Patient also feels burning pain on urination and suspects that she might have UTI..    Patient is being discharged on Thursday, 2/22/2024.  Patient is requesting prescriptions.  I reviewed and reconciled patient's problem list and medications.    Physical Exam   Vitals: Blood pressure 120/59, pulse 85, temperature 97.6 °F (36.4 °C), temperature source Temporal, resp. rate 16, height 5' 6\" (1.676 m), weight 123 kg (271 lb), SpO2 98%.,Body mass index is 43.74 kg/m².  Constitutional: Patient in no acute distress.   HEENT: PERR, EOMI, MMM.  Cardiovascular: Normal rate and regular rhythm.    Pulmonary/Chest: Effort normal and breath sounds normal.   Abdomen: Soft, + BS, NT.    Assessment/Plan:  Leydi Khan is a(n) 54 y.o. year old female with MDD.     Medical clearance.  Patient is medically cleared for discharge.  All scripts will be sent out for the patient.    1.  Cardiac with hx of HTN, HLD.  Continue lisinopril 20 mg daily and atorvastatin 10 mg daily.  2.  Migraine headaches.  Ibuprofen as needed.  3.  Tobacco abuse.  Declines nicotine patch.  Nicotine gum as needed.  4.  Seizure disorder.  Patient is on Depakote ER 1000 mg at bedtime.  5.  DJD/OA/chronic low back pain.  Tylenol, lidocaine patch daily and Voltaren gel.  6.  GERD.  Patient is on Protonix 20 mg daily.  7.  Dry cracked skin bilateral heel.  Apply ammonium lactate 2 times daily.  8.  Vitamin D deficiency.  Patient started on vitamin D2 42391 units weekly for 12 weeks followed by vitamin D3 1000 units daily.  9.  Neuropathy involving bilateral wrist and arm.  I will put the patient on gabapentin 100 mg 3 times daily " 2/19/2024.  10.  Dizziness/giddiness.  I will put the patient on meclizine 12.5 mg every 8 hours as needed.  11.  Dysuria.  I will get urine analysis to rule out UTI.

## 2024-02-20 NOTE — SOCIAL WORK
02/20/24 930   Team Meeting   Meeting Type Daily Rounds   Team Members Present   Team Members Present Physician;Nurse;;Occupational Therapist   Physician Team Member MD Aditya Valdovinos CRNP   Nursing Team Member Shira Glez, RN   Care Management Team Member BRITT Iqbal   OT Team Member ROSETTE Hernandez   Patient/Family Present   Patient Present No   Patient's Family Present No   Pt to return to [facility] with [facility] f/u Thursday, bright, pleasant, denies all

## 2024-02-20 NOTE — PLAN OF CARE
Problem: Ineffective Coping  Goal: Participates in unit activities  Description: Interventions:  - Provide therapeutic environment   - Provide required programming   - Redirect inappropriate behaviors   Outcome: Progressing  Goal: Patient/Family participate in treatment and DC plans  Description: Interventions:  - Provide therapeutic environment  Outcome: Progressing  Goal: Patient/Family verbalizes awareness of resources  Outcome: Progressing  Goal: Understands least restrictive measures  Description: Interventions:  - Utilize least restrictive behavior  Outcome: Progressing  Goal: Free from restraint events  Description: - Utilize least restrictive measures   - Provide behavioral interventions   - Redirect inappropriate behaviors   Outcome: Progressing     Problem: Risk for Self Injury/Neglect  Goal: Verbalize thoughts and feelings  Description: Interventions:  - Assess and re-assess patient's lethality and potential for self-injury  - Engage patient in 1:1 interactions, daily, for a minimum of 15 minutes  - Encourage patient to express feelings, fears, frustrations, hopes  - Establish rapport/trust with patient   Outcome: Progressing  Goal: Refrain from harming self  Description: Interventions:  - Monitor patient closely, per order  - Develop a trusting relationship  - Supervise medication ingestion, monitor effects and side effects   Outcome: Progressing  Goal: Complete daily ADLs, including personal hygiene independently, as able  Description: Interventions:  - Observe, teach, and assist patient with ADLS  - Monitor and promote a balance of rest/activity, with adequate nutrition and elimination  Outcome: Progressing     Problem: Depression  Goal: Refrain from isolation  Description: Interventions:  - Develop a trusting relationship   - Encourage socialization   Outcome: Progressing  Goal: Refrain from self-neglect  Outcome: Progressing  Goal: Complete daily ADLs, including personal hygiene independently, as  able  Description: Interventions:  - Observe, teach, and assist patient with ADLS  -  Monitor and promote a balance of rest/activity, with adequate nutrition and elimination   Outcome: Progressing     Problem: Anxiety  Goal: Anxiety is at manageable level  Description: Interventions:  - Assess and monitor patient's anxiety level.   - Monitor for signs and symptoms (heart palpitations, chest pain, shortness of breath, headaches, nausea, feeling jumpy, restlessness, irritable, apprehensive).   - Collaborate with interdisciplinary team and initiate plan and interventions as ordered.  - [place] patient to unit/surroundings  - Explain treatment plan  - Encourage participation in care  - Encourage verbalization of concerns/fears  - Identify coping mechanisms  - Assist in developing anxiety-reducing skills  - Administer/offer alternative therapies  - Limit or eliminate stimulants  Outcome: Progressing

## 2024-02-20 NOTE — NURSING NOTE
Pt observed visible on the unit, social with select peers. Compliant with medication. Cooperative with staff. Endorsed moderate to sever anxiety and depression, command AH. Stated that she wanted to bang her head off the floor d/t racing thoughts. Pt refused additional PRN medication. Pt remained visible on the unit and stated that she would approach staff if feelings worsen. Pt stated that her anxiety and depression improved and she was  currently not having thoughts of self harm. Denied SI, HI, or VH. Affect was appropriate to circumstance. Eye contact was good. Speech pattern was normal and relaxed. Appearance and hygiene was unremarkable. Made no c/o pain or discomfort. Pt is resting in her room. 7 minute safety checks continued.    (1) body pink, extremities blue

## 2024-02-20 NOTE — SOCIAL WORK
CM received return call from Letitia at [facility] [phone]. Pt to be p/u at 12pm on Thursday via [employer] staff.

## 2024-02-20 NOTE — NURSING NOTE
Patient has been observed in the milieu this morning. She is calm and cooperative. Social with select peers. Irritable edge. She has been compliant with her scheduled medications. Her appetite is good. She remains in paper scrubs for safety purposes. She endorses moderate anxiety and depression. Denies Si/Hi and hallucinations. Pt reports she is able to tell staff if she is feeling unsafe. She has been up and ambulatory without assistance. She is able to make her needs known and offer no current complaints/ concerns. Plan of care continues. Q7 minute safety checks in progress.

## 2024-02-20 NOTE — SOCIAL WORK
ROSE received return call from Letitia at [facility]. ROSE reviewed d/c plan and supports. She will let cm know time of  once confirmed with her drivers. ROSE placed call to [facility] [phone] behavioral health outpatient and secure post d/c appt on 2/27 at  1:20pm. ROSE was then transferred to pt therapist Dana to secure appt and directed to [facility].

## 2024-02-20 NOTE — PROGRESS NOTES
"Progress Note - Behavioral Health     Leydi Khan 54 y.o. female MRN: [id number]   Unit/Bed#: [facility] 203-01 Encounter: [id number]    Behavior over the last 24 hours: unchanged.     Leydi was seen for continuation of care. She appears frustrated because a peers interrupted her  during the group this morning. Continues labile, irritable and states she is \"not ok\" and does not want to discuss her discharge plan. Still verbalizes thoughts of self-harm periodically but denies any active plan presently. Denies any current AH and is taking medications regularly. Denies any physical compliant. Staff reports some participation in [employer].      Sleep: improved  Appetite: fair  Medication side effects: denies  ROS: all other systems are negative, except for back pain    Mental Status Evaluation:    Appearance:  age appropriate, overweight   Behavior:  cooperative superficially    Speech:  normal rate, normal volume   Mood:  dysphoric   Affect:  mood-congruent   Thought Process:  goal directed   Associations: concrete associations   Thought Content:  negative thoughts   Perceptual Disturbances: denies auditory hallucinations when asked.    Risk Potential: Suicidal ideation - Yes, without plan, contracts for safety on the unit  Homicidal ideation - None   Sensorium:  oriented to person, place, and time/date   Memory:  recent and remote memory grossly intact   Consciousness:  alert and awake   Attention: attention span and concentration are age appropriate   Insight:  poor   Judgment: limited   Gait/Station: in bed   Motor Activity: no abnormal movements     Vital signs in last 24 hours:    Temp:  [97.6 °F (36.4 °C)-98.1 °F (36.7 °C)] 97.6 °F (36.4 °C)  HR:  [79-88] 85  Resp:  [16-18] 16  BP: (118-128)/(58-70) 120/59    Laboratory results: I have personally reviewed all pertinent laboratory/tests results.  Most Recent Labs:   Lab Results   Component Value Date    WBC 8.24 02/11/2024    RBC 4.15 02/11/2024    HGB 12.7 02/11/2024    " HCT 38.1 02/11/2024     02/11/2024    RDW 14.6 02/11/2024    TOTANEUTABS 3.94 11/25/2023    NEUTROABS 5.36 02/11/2024    SODIUM 135 02/11/2024    K 4.1 02/11/2024    CL 98 02/11/2024    CO2 30 02/11/2024    BUN 14 02/11/2024    CREATININE 0.72 02/11/2024    GLUC 116 02/11/2024    GLUF 130 (H) 12/13/2022    CALCIUM 9.7 02/11/2024    AST 14 02/11/2024    ALT 15 02/11/2024    ALKPHOS 58 02/11/2024    TP 7.7 02/11/2024    ALB 4.1 02/11/2024    TBILI 0.47 02/11/2024    CHOLESTEROL 212 (H) 11/03/2023    HDL 42 (L) 11/03/2023    TRIG 223 (H) 11/03/2023    LDLCALC 125 (H) 11/03/2023    NONHDLC 170 11/03/2023    VALPROICTOT 46 (L) 02/15/2024    [id number] 2.768 02/11/2024    PREGUR negative 11/06/2022    PREGSERUM Negative 04/28/2022    RPR Non-Reactive 12/13/2022    HGBA1C 4.5 08/08/2023    EAG 82 08/08/2023       Assessment/Plan   Principal Problem:    MDD (major depressive disorder), recurrent, severe, with psychosis (HCC)  Active Problems:    Primary hypertension    Hyperlipidemia    Class 3 severe obesity due to excess calories without serious comorbidity in adult (HCC)    PTSD (post-traumatic stress disorder)    Seizures (HCC)    Recommended Treatment:     Planned medication and treatment changes:    All current active medications have been reviewed  Encourage group therapy, milieu therapy and occupational therapy  Behavioral Health checks every 7 minutes  Requires continued inpatient treatment due to chronic illness and high risk of decompensation if discharged before long term stability is achieved     Current Facility-Administered Medications   Medication Dose Route Frequency Provider Last Rate    acetaminophen  650 mg Oral Q4H PRN Aditya Gabrielle-Anom, CRNP      acetaminophen  650 mg Oral Q4H PRN Aditya Anthony-Anom, CRNP      acetaminophen  975 mg Oral Q6H PRN Sarah Mckay PA-C      ammonium lactate   Topical BID [place] Britton, MD      atorvastatin  10 mg Oral HS SHANI Hawk       benztropine  0.5 mg Oral Q4H PRN Max 6/day Aditya Gabrielle-Anom, CRNP      [START ON 4/30/2024] cholecalciferol  1,000 Units Oral Daily Sudhir Jarquin MD      desvenlafaxine succinate  100 mg Oral Daily Renato Mcmanus MD      Diclofenac Sodium  2 g Topical 4x Daily PRN Sarah Mckay PA-C      divalproex sodium  1,000 mg Oral HS [place] Gabrielle-Anom, CRNP      ergocalciferol  50,000 Units Oral Weekly Sudhir Jarquin MD      gabapentin  100 mg Oral TID Sudhir Jarquin MD      hydrOXYzine HCL  25 mg Oral Q6H PRN Max 4/day [place] Gabrielle-Anom, CRNP      ibuprofen  600 mg Oral Q6H PRN Sarah Mckay PA-C      lidocaine  1 patch Topical Daily PRN Sarah Mckay PA-C      lisinopril  20 mg Oral Daily Aditya Gabrielle-Anom, CRNP      LORazepam  1 mg Intramuscular Q6H PRN Max 3/day [place] Gabrielle-Anom, CRNP      LORazepam  0.5 mg Oral Q6H PRN Max 4/day [place] Gabrielle-Anom, CRNP      LORazepam  1 mg Oral Q6H PRN Max 3/day [place] Gabrielle-Anom, CRNP      meclizine  12.5 mg Oral Q8H PRN Sudhir Jarquin MD      melatonin  3 mg Oral HS [place] Gabrielle-Anom, CRNP      nicotine polacrilex  2 mg Oral Q2H PRN Sudhir Jarquin MD      OLANZapine  5 mg Intramuscular Q3H PRN Max 3/day [place] Gabrielle-Anom, CRNP      OLANZapine  2.5 mg Oral Q4H PRN Max 6/day [place] Gabrielle-Anom, CRNP      OLANZapine  5 mg Oral Q4H PRN Max 3/day [place] Gabrielle-Anom, CRNP      OLANZapine  5 mg Oral Q3H PRN Max 3/day [place] Gabrielle-Anom, CRNP      pantoprazole  20 mg Oral Early Morning Aditya Gabrielle-Anom, CRNP      prazosin  2 mg Oral HS [place] Gabrielle-Anom, CRNP      risperiDONE  3 mg Oral BID [place] Gabrielle-Anom, CRNP      traZODone  50 mg Oral HS PRN [place] Gabrielle-Anom, CRNP      traZODone  50 mg Oral HS Renato Mcmanus MD         Risks / Benefits of Treatment:    Risks, benefits, and possible side effects of medications explained to patient and patient verbalizes understanding and agreement for treatment.    Counseling / Coordination of Care:    Patient's progress discussed  with staff in treatment team meeting.  Medications, treatment progress and treatment plan reviewed with patient.  Supportive therapy provided to patient.  Group attendance encouraged.    Renato Mcmanus MD 02/20/24

## 2024-02-20 NOTE — SOCIAL WORK
CM placed call to [facility] [phone] to notify of PT plan to d/c on Thurs. CM spoke with Maylin. Pt will f/u with Dr Nettles and therapist Makayla upon return.  Maylin stated she will have care coordinator call with p/u time. Scripts should be sent to [facility] [phone] in [place].     ROSE placed call to pt primary care [facility] [phone]. CM directed to [facility] requesting a return call with appt time and date.

## 2024-02-20 NOTE — NURSING NOTE
PT observed sleeping during q7 minute safety checks.  No distress or labor breathing observed.   No changes in medical condition. No behavior noted.  Will continue to monitor.

## 2024-02-21 PROCEDURE — 99232 SBSQ HOSP IP/OBS MODERATE 35: CPT | Performed by: PSYCHIATRY & NEUROLOGY

## 2024-02-21 RX ORDER — RISPERIDONE 3 MG/1
3 TABLET ORAL 2 TIMES DAILY
Qty: 60 TABLET | Refills: 0 | Status: SHIPPED | OUTPATIENT
Start: 2024-02-21

## 2024-02-21 RX ORDER — DESVENLAFAXINE 100 MG/1
100 TABLET, EXTENDED RELEASE ORAL DAILY
Qty: 30 TABLET | Refills: 0 | Status: SHIPPED | OUTPATIENT
Start: 2024-02-22

## 2024-02-21 RX ORDER — GABAPENTIN 100 MG/1
100 CAPSULE ORAL 3 TIMES DAILY
Qty: 90 CAPSULE | Refills: 0 | Status: SHIPPED | OUTPATIENT
Start: 2024-02-21

## 2024-02-21 RX ORDER — LANOLIN ALCOHOL/MO/W.PET/CERES
3 CREAM (GRAM) TOPICAL
Qty: 30 TABLET | Refills: 0 | Status: SHIPPED | OUTPATIENT
Start: 2024-02-21

## 2024-02-21 RX ORDER — DIVALPROEX SODIUM 500 MG/1
1000 TABLET, DELAYED RELEASE ORAL
Qty: 60 TABLET | Refills: 0 | Status: SHIPPED | OUTPATIENT
Start: 2024-02-21

## 2024-02-21 RX ORDER — TRAZODONE HYDROCHLORIDE 50 MG/1
50 TABLET ORAL
Qty: 30 TABLET | Refills: 0 | Status: SHIPPED | OUTPATIENT
Start: 2024-02-21

## 2024-02-21 RX ADMIN — LISINOPRIL 20 MG: 20 TABLET ORAL at 08:07

## 2024-02-21 RX ADMIN — RISPERIDONE 3 MG: 1 TABLET ORAL at 21:45

## 2024-02-21 RX ADMIN — GABAPENTIN 100 MG: 100 CAPSULE ORAL at 08:07

## 2024-02-21 RX ADMIN — RISPERIDONE 3 MG: 1 TABLET ORAL at 08:07

## 2024-02-21 RX ADMIN — ATORVASTATIN CALCIUM 10 MG: 10 TABLET, FILM COATED ORAL at 21:46

## 2024-02-21 RX ADMIN — GABAPENTIN 100 MG: 100 CAPSULE ORAL at 15:51

## 2024-02-21 RX ADMIN — DESVENLAFAXINE 100 MG: 50 TABLET, FILM COATED, EXTENDED RELEASE ORAL at 08:07

## 2024-02-21 RX ADMIN — Medication: at 08:08

## 2024-02-21 RX ADMIN — PANTOPRAZOLE SODIUM 20 MG: 20 TABLET, DELAYED RELEASE ORAL at 06:07

## 2024-02-21 RX ADMIN — LORAZEPAM 1 MG: 1 TABLET ORAL at 17:04

## 2024-02-21 RX ADMIN — Medication: at 19:01

## 2024-02-21 RX ADMIN — PRAZOSIN HYDROCHLORIDE 2 MG: 1 CAPSULE ORAL at 21:44

## 2024-02-21 RX ADMIN — TRAZODONE HYDROCHLORIDE 50 MG: 50 TABLET ORAL at 21:45

## 2024-02-21 RX ADMIN — HYDROXYZINE HYDROCHLORIDE 25 MG: 25 TABLET ORAL at 15:51

## 2024-02-21 RX ADMIN — Medication 3 MG: at 21:45

## 2024-02-21 RX ADMIN — GABAPENTIN 100 MG: 100 CAPSULE ORAL at 21:45

## 2024-02-21 RX ADMIN — DIVALPROEX SODIUM 1000 MG: 500 TABLET, DELAYED RELEASE ORAL at 21:44

## 2024-02-21 NOTE — NURSING NOTE
"Upon assessment this morning pt is bright and pleasant. She reports sleeping well last night. She is social with staff and peers. She has been compliant with her scheduled medications. Her appetite is good- 100% of meals. She does agitate easily. She reports mild anxiety and depression. Denies Si/Hi and hallucinations. She reports that she will \"work very hard\" today to gain back some of her privileges. She remains in paper scrubs at this time. She has been up and ambulatory without assistance. She is able to make her needs known and offers no current complaints/ concerns. Plan of care continues. Q7 minute safety checks in progress.   "

## 2024-02-21 NOTE — NURSING NOTE
Leydi reports prn Ativan 1 mg was effective. She states feeling calm and can tolerate peers behavior. She ate dinner without any behaviors or anxiety.

## 2024-02-21 NOTE — PROGRESS NOTES
Progress Note - Behavioral Health     Leydi Khan 54 y.o. female MRN: [id number]   Unit/Bed#: [facility] 203-01 Encounter: [id number]    Behavior over the last 24 hours: some improvement.     Leydi was seen for continuation of care. She appears calm, less restless this morning. States she wants to get off from finger food because she is doing better and not suicidal. She is in agreement for discharge plan for tomorrow and denies any thoughts of self-harm presently. Denies any current delusion or AH. She has been adherence to meds and denies any current side effects. Pt appears to be at her baseline. Staff reports some participation in [employer].      Sleep: improved  Appetite: fair  Medication side effects: denies  ROS: all other systems are negative, except for back pain    Mental Status Evaluation:    Appearance:  age appropriate, overweight   Behavior:  cooperative, calm    Speech:  normal rate, normal volume   Mood:  euthymic   Affect:  mood-congruent   Thought Process:  goal directed   Associations: concrete associations   Thought Content:  no overt delusions   Perceptual Disturbances: none.    Risk Potential: Suicidal ideation - None  Homicidal ideation - None   Sensorium:  oriented to person, place, and time/date   Memory:  recent and remote memory grossly intact   Consciousness:  alert and awake   Attention: attention span and concentration are age appropriate   Insight:  limited   Judgment: fair   Gait/Station: normal gait/station   Motor Activity: no abnormal movements     Vital signs in last 24 hours:    Temp:  [97.8 °F (36.6 °C)-98.3 °F (36.8 °C)] 97.8 °F (36.6 °C)  HR:  [80-84] 80  Resp:  [18] 18  BP: (106-134)/(52-72) 116/66    Laboratory results: I have personally reviewed all pertinent laboratory/tests results.  Most Recent Labs:   Lab Results   Component Value Date    WBC 8.24 02/11/2024    RBC 4.15 02/11/2024    HGB 12.7 02/11/2024    HCT 38.1 02/11/2024     02/11/2024    RDW 14.6 02/11/2024     TOTANEUTABS 3.94 11/25/2023    NEUTROABS 5.36 02/11/2024    SODIUM 135 02/11/2024    K 4.1 02/11/2024    CL 98 02/11/2024    CO2 30 02/11/2024    BUN 14 02/11/2024    CREATININE 0.72 02/11/2024    GLUC 116 02/11/2024    GLUF 130 (H) 12/13/2022    CALCIUM 9.7 02/11/2024    AST 14 02/11/2024    ALT 15 02/11/2024    ALKPHOS 58 02/11/2024    TP 7.7 02/11/2024    ALB 4.1 02/11/2024    TBILI 0.47 02/11/2024    CHOLESTEROL 212 (H) 11/03/2023    HDL 42 (L) 11/03/2023    TRIG 223 (H) 11/03/2023    LDLCALC 125 (H) 11/03/2023    NONHDLC 170 11/03/2023    VALPROICTOT 46 (L) 02/15/2024    [id number] 2.768 02/11/2024    PREGUR negative 11/06/2022    PREGSERUM Negative 04/28/2022    RPR Non-Reactive 12/13/2022    HGBA1C 4.5 08/08/2023    EAG 82 08/08/2023       Assessment/Plan   Principal Problem:    MDD (major depressive disorder), recurrent, severe, with psychosis (HCC)  Active Problems:    Primary hypertension    Hyperlipidemia    Class 3 severe obesity due to excess calories without serious comorbidity in adult (HCC)    PTSD (post-traumatic stress disorder)    Seizures (HCC)    Recommended Treatment:     Planned medication and treatment changes:    All current active medications have been reviewed  Encourage group therapy, milieu therapy and occupational therapy  Behavioral Health checks every 7 minutes  Discharge planned for tomorrow     Current Facility-Administered Medications   Medication Dose Route Frequency Provider Last Rate    acetaminophen  650 mg Oral Q4H PRN Aditya Anthony-Anom, CRNP      acetaminophen  650 mg Oral Q4H PRN Aditya Anthony-Anom, CRNP      acetaminophen  975 mg Oral Q6H PRN Sarah Mckay PA-C      ammonium lactate   Topical BID Sudhir Jarquin MD      atorvastatin  10 mg Oral HS Aditya Farias, CRNP      benztropine  0.5 mg Oral Q4H PRN Max 6/day SHANI Hawk      [START ON 4/30/2024] cholecalciferol  1,000 Units Oral Daily Sudhir Jarquin MD      desvenlafaxine succinate  100 mg Oral  Daily Renato Mcmanus MD      Diclofenac Sodium  2 g Topical 4x Daily PRN Sarah Mckay PA-C      divalproex sodium  1,000 mg Oral HS [place] Gabri[facility], CRNP      ergocalciferol  50,000 Units Oral Weekly Sudhir Jarquin MD      gabapentin  100 mg Oral TID Sudhir Jarquin MD      hydrOXYzine HCL  25 mg Oral Q6H PRN Max 4/day [place] Gabrielle-Anom, CRNP      ibuprofen  600 mg Oral Q6H PRN Sarah Mckay PA-C      lidocaine  1 patch Topical Daily PRN Sarah Mckay PA-C      lisinopril  20 mg Oral Daily Aditya Gabrielle-Ano, CRNP      LORazepam  1 mg Intramuscular Q6H PRN Max 3/day [place] Gabrielle-Anom, CRNP      LORazepam  0.5 mg Oral Q6H PRN Max 4/day [place] Gabrielle-Anom, CRNP      LORazepam  1 mg Oral Q6H PRN Max 3/day [place] Gabrielle-Ano, CRNP      meclizine  12.5 mg Oral Q8H PRN Sudhir Jarquin MD      melatonin  3 mg Oral HS [facility], CRNP      nicotine polacrilex  2 mg Oral Q2H PRN Sudhir Jarquin MD      OLANZapine  5 mg Intramuscular Q3H PRN Max 3/day [place] Gabrielle-Anom, CRNP      OLANZapine  2.5 mg Oral Q4H PRN Max 6/day [place] Gabrielle-Anom, CRNP      OLANZapine  5 mg Oral Q4H PRN Max 3/day [place] Gabrielle-Anom, CRNP      OLANZapine  5 mg Oral Q3H PRN Max 3/day [place] Gabrielle-Anom, CRNP      pantoprazole  20 mg Oral Early Morning [place] Gabrielle-Anom, CRNP      prazosin  2 mg Oral HS [place] Gabrielle-Anom, CRNP      risperiDONE  3 mg Oral BID [place] Gabrielle-Ano, CRNP      traZODone  50 mg Oral HS PRN [place] Gabrielle-Anom, CRNP      traZODone  50 mg Oral HS Renato Mcmanus MD         Risks / Benefits of Treatment:    Risks, benefits, and possible side effects of medications explained to patient and patient verbalizes understanding and agreement for treatment.    Counseling / Coordination of Care:    Patient's progress discussed with staff in treatment team meeting.  Medications, treatment progress and treatment plan reviewed with patient.  Supportive therapy provided to patient.  Group attendance encouraged.  Discharge  plan discussed with patient.    Renato Mcmanus MD 02/21/24

## 2024-02-21 NOTE — NURSING NOTE
"Leydi reports feeling anxious and agitated r/t peer's intrusive disorganized behavior. Leydi is loudly yelling at peer when she's in her vicinity. She is making harsh comments and states she will hurt herself. This is Leydi's baseline, her reaction whenever she becomes upset, anxious, or frustrated is to say, \"I'm going to hurt myself or someone else.\" Leydi's Smith score is 17. Prn Atarax 25 mg administered at 1551 for anxiety. If pt remains anxious at f/u assessment will administer moderate prn medication.  "

## 2024-02-21 NOTE — DISCHARGE INSTR - OTHER ORDERS
You are being discharged to your group home [facility] located at [address] [place] [address] , Phone: [phone].     Triggers you have identified during your hospitalization that led to your admission distressed mood, includes regression in mental health. Coping skills you have identified during your hospitalization include talking with others, music, writing, coloring. If you are unable to deal with your distressed mood alone please contact your provider with [facility] at [phone] or your primary care provider with [facility] at [phone]. If that is not effective and you continue to have (ex: suicidal ideation, homicidal ideation, distressed mood, overwhelmed, in crisis) please contact (Crisis #) [facility]  or [place] [phone], dial 911 or go to the nearest emergency center.        [facility] [phone]   [facility] [phone]   *National Suicide Prevention Lifeline:  1-325.690.9274  *Alcohol Anonymous: 160.936.9165  *[facility] Drug and Alcohol Commission: 504.802.5136  *National [place] [employer]ness (MONIKA) HELPLINE: 709.105.9971/Website: www.monika.org  *Substance Abuse and Mental Health Services Administration([facility]) National Helpline, which is a confidential, free, 24-hour-a-day, 365-day-a-year, information service for individuals and family members facing mental health and/or substance use disorders. This service provides referrals to local treatment facilities, support groups, and community-based organizations. Callers can also order free publications and other information.  Call 1-598.600.8846/Website: www.[facility].gov  *United [employer] 2-1-1: This is a toll free, confidential, 24-hour-a-day service which connects you to a community  in your area who can help you find services and resources that are available to you locally and provide critical services that can improve and save lives.  Call: 211   /Website: http://www.211.org/       Franca, or Lilliana, our Behavioral Health Nurse Navigators, will be calling you after your discharge, on the phone number that you provided.  They will be available as an additional support, if needed.   If you wish to speak with one of them, you may contact Franca at [phone] or Lilliana at [phone].  You are being discharged to

## 2024-02-21 NOTE — SOCIAL WORK
CM met with PT for PT check in. PT denies si/hi/avh anxiety and depression, reviewed discharge plan for tomorrow. PT in agreement with all. PT feels she is ready for discharge. PT expressed gratitude. Positive reinforcement provided for PT gains.

## 2024-02-21 NOTE — PLAN OF CARE
Problem: DISCHARGE PLANNING - CARE MANAGEMENT  Goal: Discharge to post-acute care or home with appropriate resources  Description: INTERVENTIONS:  - Conduct assessment to determine patient/family and health care team treatment goals, and need for post-acute services based on payer coverage, community resources, and patient preferences, and barriers to discharge  - Address psychosocial, clinical, and financial barriers to discharge as identified in assessment in conjunction with the patient/family and health care team  - Arrange appropriate level of post-acute services according to patient’s   needs and preference and payer coverage in collaboration with the physician and health care team  - Communicate with and update the patient/family, physician, and health care team regarding progress on the discharge plan  - Arrange appropriate transportation to post-acute venues  Outcome: Completed   PT will d/c to [facility] tomorrow 2/22/24 and will f/u w/ [facility] for psych and w/ pcp.

## 2024-02-21 NOTE — NURSING NOTE
"Pt is severely anxious/agitated after prn Atarax 25 mg was administered at 1551. Pt reports positive coping skills are not effective at this time and she doesn't want to do anything that will mess her d/c up tomorrow. Pt states, \"Once I'm out of here, I'll be fine. It's this woman (peer) that messes me up!\" Smith score 24. Prn Ativan 1 mg administered at 1704 for severe anxiety.  "

## 2024-02-21 NOTE — PLAN OF CARE
Problem: Ineffective Coping  Goal: Participates in unit activities  Description: Interventions:  - Provide therapeutic environment   - Provide required programming   - Redirect inappropriate behaviors   Outcome: Progressing  Goal: Patient/Family participate in treatment and DC plans  Description: Interventions:  - Provide therapeutic environment  Outcome: Progressing  Goal: Patient/Family verbalizes awareness of resources  Outcome: Progressing  Goal: Understands least restrictive measures  Description: Interventions:  - Utilize least restrictive behavior  Outcome: Progressing  Goal: Free from restraint events  Description: - Utilize least restrictive measures   - Provide behavioral interventions   - Redirect inappropriate behaviors   Outcome: Progressing     Problem: Risk for Self Injury/Neglect  Goal: Verbalize thoughts and feelings  Description: Interventions:  - Assess and re-assess patient's lethality and potential for self-injury  - Engage patient in 1:1 interactions, daily, for a minimum of 15 minutes  - Encourage patient to express feelings, fears, frustrations, hopes  - Establish rapport/trust with patient   Outcome: Progressing  Goal: Refrain from harming self  Description: Interventions:  - Monitor patient closely, per order  - Develop a trusting relationship  - Supervise medication ingestion, monitor effects and side effects   Outcome: Progressing     Problem: Depression  Goal: Refrain from isolation  Description: Interventions:  - Develop a trusting relationship   - Encourage socialization   Outcome: Progressing  Goal: Refrain from self-neglect  Outcome: Progressing     Problem: Anxiety  Goal: Anxiety is at manageable level  Description: Interventions:  - Assess and monitor patient's anxiety level.   - Monitor for signs and symptoms (heart palpitations, chest pain, shortness of breath, headaches, nausea, feeling jumpy, restlessness, irritable, apprehensive).   - Collaborate with interdisciplinary team  and initiate plan and interventions as ordered.  - [place] patient to unit/surroundings  - Explain treatment plan  - Encourage participation in care  - Encourage verbalization of concerns/fears  - Identify coping mechanisms  - Assist in developing anxiety-reducing skills  - Administer/offer alternative therapies  - Limit or eliminate stimulants  Outcome: Progressing

## 2024-02-21 NOTE — NURSING NOTE
"Pt reports medication wasn't effective. Pt continues to remain tense, and reports she is still anxious and agitated. She continues to stomp and pound table when particular peer in near. RN provides therapeutic support, positive encouragement, and reviews positive coping skills such as time alone, coloring in her room, and deep breathing. Pt's response is \"Nothing works, give me a pill before I lose it.\"  "

## 2024-02-21 NOTE — SOCIAL WORK
CM met with pt to review d/c plan and f/u supports. IMM explained to pt who expressed verbal confirmation of understanding and in agreement. Pt declined the right to appeal d/c at this time. Pt signed IMM and verbalized readiness to d/c home tomorrow.

## 2024-02-21 NOTE — CMS CERTIFICATION NOTE
Recertification: Based upon physical, mental and social evaluations, I certify that inpatient psychiatric services continue to be medically necessary for this patient for a duration of 20 midnights for the treatment of MDD (major depressive disorder), recurrent, severe, with psychosis (HCC) Available alternative community resources still do not meet the patient's mental health care needs. I further attest that an established written individualized plan of care has been updated and is outlined in the patient's medical records.

## 2024-02-21 NOTE — NURSING NOTE
"Leydi remained frustrated, irritable, and angry throughout the evening. She continued to make statements and write notes about inflicting self harm, these appear to be attention seeking tactics. Leydi stated to writer, \"I'm not discharging, I'm going to do whatever I have to, I'll bang my head, I'll hurt someone.\" Writer explained that these behaviors are not effective. Pt was receptive to feedback, yet was unable to implement positive behaviors. Pt is triggered by a specific peer and frequently verbally lashes out. Staff provides therapeutic support and positive encouragement. Pt remains in paper scrubs, paper sheets, and no writing utensils other than a crayon.   "

## 2024-02-22 VITALS
SYSTOLIC BLOOD PRESSURE: 144 MMHG | WEIGHT: 271 LBS | BODY MASS INDEX: 43.55 KG/M2 | OXYGEN SATURATION: 95 % | TEMPERATURE: 98.3 F | RESPIRATION RATE: 16 BRPM | HEIGHT: 66 IN | DIASTOLIC BLOOD PRESSURE: 70 MMHG | HEART RATE: 95 BPM

## 2024-02-22 PROBLEM — F33.3 MDD (MAJOR DEPRESSIVE DISORDER), RECURRENT, SEVERE, WITH PSYCHOSIS (HCC): Status: RESOLVED | Noted: 2024-02-13 | Resolved: 2024-02-22

## 2024-02-22 PROCEDURE — 99238 HOSP IP/OBS DSCHRG MGMT 30/<: CPT | Performed by: PSYCHIATRY & NEUROLOGY

## 2024-02-22 RX ADMIN — PANTOPRAZOLE SODIUM 20 MG: 20 TABLET, DELAYED RELEASE ORAL at 05:53

## 2024-02-22 RX ADMIN — RISPERIDONE 3 MG: 1 TABLET ORAL at 08:20

## 2024-02-22 RX ADMIN — LISINOPRIL 20 MG: 20 TABLET ORAL at 08:20

## 2024-02-22 RX ADMIN — DESVENLAFAXINE 100 MG: 50 TABLET, FILM COATED, EXTENDED RELEASE ORAL at 08:20

## 2024-02-22 RX ADMIN — Medication: at 08:20

## 2024-02-22 RX ADMIN — GABAPENTIN 100 MG: 100 CAPSULE ORAL at 08:20

## 2024-02-22 NOTE — SOCIAL WORK
CM met with PT for PT check in. Reviewed discharge plan along with follow up care and supports, PT in agreement with all. PT denies si/hi/avh depression, reported some anxiety. PT verbalized that she is ready for discharge. PT expressed gratitude.

## 2024-02-22 NOTE — PROGRESS NOTES
"Progress Note - Leydi Khan 54 y.o. female MRN: [id number]    Unit/Bed#: [facility] 203-01 Encounter: [id number]        Subjective:   Patient seen and examined at bedside after reviewing the chart and discussing the case with the caring staff.      Patient examined at bedside.  Patient has no acute complaints.    Patient is being discharged today, Thursday 2/22/2024.  Patient is requesting prescriptions.  I reviewed and reconciled patient's problem list and medications.    Physical Exam   Vitals: Blood pressure 144/70, pulse 95, temperature 98.3 °F (36.8 °C), temperature source Temporal, resp. rate 16, height 5' 6\" (1.676 m), weight 123 kg (271 lb), SpO2 95%.,Body mass index is 43.74 kg/m².  Constitutional: Patient in no acute distress.   HEENT: PERR, EOMI, MMM.  Cardiovascular: Normal rate and regular rhythm.    Pulmonary/Chest: Effort normal and breath sounds normal.   Abdomen: Soft, + BS, NT.    Assessment/Plan:  Leydi Khan is a(n) 54 y.o. year old female with MDD.     Medical clearance.  Patient is medically cleared for discharge.  All scripts will be sent out for the patient.    1.  Cardiac with hx of HTN, HLD.  Continue lisinopril 20 mg daily and atorvastatin 10 mg daily.  2.  Migraine headaches.  Ibuprofen as needed.  3.  Tobacco abuse.  Declines nicotine patch.  Nicotine gum as needed.  4.  Seizure disorder.  Patient is on Depakote ER 1000 mg at bedtime.  5.  DJD/OA/chronic low back pain.  Tylenol, lidocaine patch daily and Voltaren gel.  6.  GERD.  Patient is on Protonix 20 mg daily.  7.  Dry cracked skin bilateral heel.  Apply ammonium lactate 2 times daily.  8.  Vitamin D deficiency.  Patient started on vitamin D2 27922 units weekly for 12 weeks followed by vitamin D3 1000 units daily.  9.  Neuropathy involving bilateral wrist and arm.  Patient started on gabapentin 100 mg 3 times daily 2/19/2024.  10.  Dizziness/giddiness.  Patient may request meclizine 12.5 mg every 8 hours as needed.  11.  Dysuria.  UA " 2/20/2024 showed no evidence of UTI.    The patient was discussed with Dr. Jarquin and he is in agreement with the above note.

## 2024-02-22 NOTE — NURSING NOTE
Leydi is resting in bed, eyes closed, respirations regular, even and non-labored.  Pt. arouses easily. No s/s of anxiety or distress noted. Personal items within reach. Q 7 minute safety checks maintained throughout shift.

## 2024-02-22 NOTE — NURSING NOTE
"Pt awoke to take HS medications. She reports sleeping to make the time pass. She states feeling ready to \"get the hell out of here.\" She denies psych symptoms when asked.   "

## 2024-02-22 NOTE — NURSING NOTE
Assumed care of this patient from prior shift nurse at 2315.  Patient is currently in bed, appears to be sleeping.  No acute behaviors observed.  Breathing is easy and non-labored on room air.  Continuous safety rounding in progress.

## 2024-02-22 NOTE — NURSING NOTE
Patient appears to have slept through the overnight hours without issue.  No complaints voiced.  No acute behaviors observed. Patient remains in bed sleeping at this time.  Continuous safety rounding in progress.

## 2024-02-22 NOTE — PLAN OF CARE
Problem: Risk for Self Injury/Neglect  Goal: Verbalize thoughts and feelings  Description: Interventions:  - Assess and re-assess patient's lethality and potential for self-injury  - Engage patient in 1:1 interactions, daily, for a minimum of 15 minutes  - Encourage patient to express feelings, fears, frustrations, hopes  - Establish rapport/trust with patient   Outcome: Progressing  Goal: Refrain from harming self  Description: Interventions:  - Monitor patient closely, per order  - Develop a trusting relationship  - Supervise medication ingestion, monitor effects and side effects   Outcome: Progressing     Problem: Anxiety  Goal: Anxiety is at manageable level  Description: Interventions:  - Assess and monitor patient's anxiety level.   - Monitor for signs and symptoms (heart palpitations, chest pain, shortness of breath, headaches, nausea, feeling jumpy, restlessness, irritable, apprehensive).   - Collaborate with interdisciplinary team and initiate plan and interventions as ordered.  - [place] patient to unit/surroundings  - Explain treatment plan  - Encourage participation in care  - Encourage verbalization of concerns/fears  - Identify coping mechanisms  - Assist in developing anxiety-reducing skills  - Administer/offer alternative therapies  - Limit or eliminate stimulants  Outcome: Progressing

## 2024-02-22 NOTE — DISCHARGE SUMMARY
"  Discharge Summary - Behavioral Health   Leydi Khan 54 y.o. female MRN: [id number]  Unit/Bed#: [facility] 203-01 Encounter: [id number]     Admission Date: 2/12/2024         Discharge Date: 2/22/2024    Attending Psychiatrist: Renato Mcmanus MD    Reason for Admission/HPI: Suicidal ideation [R45.851]    History of Present Illness:     Leydi Khan is a 54 y.o. female with a history of Major Depressive Disorder, anxiety, and PTSD who was admitted to the inpatient older adult psychiatric unit on a voluntary 201 commitment basis due to depression, anxiety, unstable mood, suicidal ideation, and self-abusive behavior. Patient was brought by EMS/police to ED accompanied by her group home staff after she cut her forearm superficially using a fork. She also verbalized suicidal ideation with plan to cut her wrist along with worsening of auditory hallucinations. UDS was negative. EKG with no acute changes.   On evaluation in the inpatient psychiatric unit Leydi presents anxious, restless but able to engage in appropriate conversation. Patient reports that for the past several weeks she has been feeling increasingly anxious, depressed, increased hopeless and helplessness with suicidal ideation to cut her wrists or hang herself. Pt admits she cut her forearm   using a fork in as well as writing \"kill Leydi\" on her left arm. Denies any active plan or intent to hurt herself and she is able to contract for safety presently. She feels fairly adjusted to the unit since she is well known to unit team due to past admissions. Patient verbalizes feeling anxious due to ongoing auditory hallucination periodically but denies any command to hurt herself and is more derogatory at this time. Denies any current paranoid delusion, homicidal ideation, recent manic episode, alcohol or illicit drug use. Patient agreed to be compliant with medication and treatment plan.    Hospital Course: The patient was admitted to the inpatient psychiatric unit and started on " every 7 minutes precautions. During the hospitalization the patient was attending individual therapy, group therapy, milieu therapy and occupational therapy.    Psychiatric medications were titrated over the hospital stay. To address mood instability, irritability, self-abusive behavior, auditory hallucinations, anxiety symptoms, and insomnia the patient was started on antidepressant Pristiq, mood stabilizer Depakote ER, antipsychotic medication Risperdal, hypnotic medication Trazodone, and medication to help with nightmares and flashbacks Prazosin. Medication doses were titrated during the hospital course. Prior to beginning of treatment medications risks and benefits and possible side effects including risk of suicidality and serotonin syndrome related to treatment with antidepressants, risks of misuse, abuse or dependence, sedation and respiratory depression related to treatment with benzodiazepine medications, and risks of cardiovascular side effects including elevated blood pressure, risk of misuse, abuse or dependence and risk of increased anxiety related to treatment with stimulant medications were reviewed with the patient. The patient verbalized understanding and agreement for treatment.     Patient's symptoms improved gradually over the hospital course. At the end of treatment the patient was doing well. Mood was stable at the time of discharge. The patient denied suicidal ideation, intent or plan at the time of discharge and denied homicidal ideation, intent or plan at the time of discharge. There was no overt psychosis at the time of discharge. Sleep and appetite were improved. The patient was tolerating medications and was not reporting any significant side effects at the time of discharge.    Since the patient was doing well at the end of the hospitalization, treatment team felt that the patient could be safely discharged to [facility] with outpatient care. The outpatient follow up was arranged  by the unit  upon discharge.    Mental Status at time of Discharge:     Appearance:  age appropriate and overweight   Behavior:  cooperative   Speech:  normal volume   Mood:  euthymic   Affect:  mood-congruent   Thought Process:  goal directed   Thought Content:  no overt delusions   Perceptual Disturbances: None   Risk Potential: Suicidal Ideations none, HI none   Sensorium:  person, place, and time/date   Cognition:  recent and remote memory grossly intact   Consciousness:  alert and awake    Attention: attention span and concentration were age appropriate   Insight:  fair   Judgment: fair   Gait/Station: normal gait/station   Motor Activity: no abnormal movements     Admission Diagnosis:Suicidal ideation [R45.851]    Discharge Diagnosis:   Principal Problem (Resolved):    MDD (major depressive disorder), recurrent, severe, with psychosis ([facility])  Active Problems:    Primary hypertension    Hyperlipidemia    Class 3 severe obesity due to excess calories without serious comorbidity in adult ([facility])    PTSD (post-traumatic stress disorder)    Seizures ([facility])    Lab results:  Admission on 02/12/2024, Discharged on 02/22/2024   Component Date Value    Vit D, 25-Hydroxy 02/11/2024 15.2 (L)     Vitamin B-12 02/11/2024 469     Folate 02/11/2024 14.3     Valproic Acid, Total 02/15/2024 46 (L)     Color, UA 02/20/2024 Yellow     Clarity, UA 02/20/2024 Clear     Specific Gravity, UA 02/20/2024 1.010     pH, UA 02/20/2024 7.0     Leukocytes, UA 02/20/2024 2+ (A)     Nitrite, UA 02/20/2024 Negative     Protein, UA 02/20/2024 Negative     Glucose, UA 02/20/2024 Negative     Ketones, UA 02/20/2024 Negative     Urobilinogen, UA 02/20/2024 1.0     Bilirubin, UA 02/20/2024 Negative     Occult Blood, UA 02/20/2024 Negative     RBC, UA 02/20/2024 0-1     WBC, UA 02/20/2024 4-10 (A)     Epithelial Cells 02/20/2024 Occasional     Bacteria, UA 02/20/2024 Occasional        Discharge Medications:  Current Discharge Medication List         START taking these medications    Details   ammonium lactate (LAC-HYDRIN) 12 % lotion Apply topically 2 (two) times a day  Qty: 225 g, Refills: 0    Associated Diagnoses: Dry skin      cholecalciferol (VITAMIN D3) 1,000 units tablet Take 1 tablet (1,000 Units total) by mouth daily Do not start before April 30, 2024.  Qty: 30 tablet, Refills: 0    Associated Diagnoses: Vitamin D deficiency      Diclofenac Sodium (VOLTAREN) 1 % Apply 2 g topically 4 (four) times a day as needed (pain)  Qty: 350 g, Refills: 0    Associated Diagnoses: Chronic midline low back pain without sciatica      ergocalciferol (VITAMIN D2) 50,000 units Take 1 capsule (50,000 Units total) by mouth once a week for 11 doses  Qty: 10 capsule, Refills: 0    Associated Diagnoses: Vitamin D deficiency      gabapentin (NEURONTIN) 100 mg capsule Take 1 capsule (100 mg total) by mouth 3 (three) times a day  Qty: 90 capsule, Refills: 0    Associated Diagnoses: MDD (major depressive disorder), recurrent, severe, with psychosis (HCC)      ibuprofen (MOTRIN) 600 mg tablet Take 1 tablet (600 mg total) by mouth every 6 (six) hours as needed for headaches, fever or moderate pain  Qty: 60 tablet, Refills: 0    Associated Diagnoses: Chronic midline low back pain without sciatica      lidocaine (LIDODERM) 5 % Apply 1 patch topically over 12 hours daily as needed (Daily PRN) Remove & Discard patch within 12 hours or as directed by MD  Qty: 30 patch, Refills: 0    Associated Diagnoses: Chronic midline low back pain without sciatica      meclizine (ANTIVERT) 12.5 MG tablet Take 1 tablet (12.5 mg total) by mouth every 8 (eight) hours as needed for dizziness  Qty: 30 tablet, Refills: 0    Associated Diagnoses: Dizziness      nicotine polacrilex (NICORETTE) 2 mg gum Chew 1 each (2 mg total) every 2 (two) hours as needed for smoking cessation  Qty: 100 each, Refills: 0    Associated Diagnoses: Tobacco abuse      traZODone (DESYREL) 50 mg tablet Take 1 tablet (50 mg  total) by mouth daily at bedtime  Qty: 30 tablet, Refills: 0    Associated Diagnoses: Insomnia; MDD (major depressive disorder), recurrent, severe, with psychosis (HCC)              Current Discharge Medication List        STOP taking these medications       clonazePAM (KlonoPIN) 1 mg tablet Comments:   Reason for Stopping:         diphenhydrAMINE (BENADRYL) 25 mg capsule Comments:   Reason for Stopping:         QUEtiapine (SEROquel) 25 mg tablet Comments:   Reason for Stopping:         vilazodone (VIIBRYD) 40 mg tablet Comments:   Reason for Stopping:                Current Discharge Medication List        CONTINUE these medications which have CHANGED    Details   acetaminophen (TYLENOL) 325 mg tablet Take 2 tablets (650 mg total) by mouth every 4 (four) hours as needed for mild pain  Qty: 90 tablet, Refills: 0    Associated Diagnoses: Chronic midline low back pain without sciatica      atorvastatin (LIPITOR) 10 mg tablet Take 1 tablet (10 mg total) by mouth daily at bedtime  Qty: 30 tablet, Refills: 0    Associated Diagnoses: Mixed hyperlipidemia      desvenlafaxine succinate (PRISTIQ) 100 mg 24 hr tablet Take 1 tablet (100 mg total) by mouth daily  Qty: 30 tablet, Refills: 0    Associated Diagnoses: MDD (major depressive disorder), recurrent, severe, with psychosis (HCC)      divalproex sodium (DEPAKOTE) 500 mg DR tablet Take 2 tablets (1,000 mg total) by mouth daily at bedtime  Qty: 60 tablet, Refills: 0    Associated Diagnoses: MDD (major depressive disorder), recurrent, severe, with psychosis (HCC)      lisinopril (ZESTRIL) 20 mg tablet Take 1 tablet (20 mg total) by mouth daily  Qty: 30 tablet, Refills: 0    Associated Diagnoses: Primary hypertension      melatonin 3 mg Take 1 tablet (3 mg total) by mouth daily at bedtime  Qty: 30 tablet, Refills: 0    Associated Diagnoses: Insomnia      pantoprazole (PROTONIX) 20 mg tablet Take 1 tablet (20 mg total) by mouth daily  Qty: 30 tablet, Refills: 0    Associated  Diagnoses: Atypical chest pain      prazosin (MINIPRESS) 2 mg capsule Take 1 capsule (2 mg total) by mouth daily at bedtime  Qty: 30 capsule, Refills: 0    Associated Diagnoses: Major depression with psychotic features (HCC)      risperiDONE (RisperDAL) 3 mg tablet Take 1 tablet (3 mg total) by mouth 2 (two) times a day  Qty: 60 tablet, Refills: 0    Associated Diagnoses: MDD (major depressive disorder), recurrent, severe, with psychosis (HCC)              Current Discharge Medication List           Discharge instructions/Information to patient and family:   See after visit summary for information provided to patient and family.      Provisions for Follow-Up Care:  See after visit summary for information related to follow-up care and any pertinent home health orders.      Discharge Statement   I spent 20 minutes discharging the patient. This time was spent on the day of discharge. I had direct contact with the patient on the day of discharge. Additional documentation is required if more than 30 minutes were spent on discharge.

## 2024-02-22 NOTE — PROGRESS NOTES
02/21/24 0930   Team Meeting   Meeting Type Daily Rounds   Team Members Present   Team Members Present Physician;Nurse;   Physician Team Member Dr. Marietta MD; SHANI Rubio   Nursing Team Member Shira Glez, RN   Care Management Team Member Elif Laws, MS, NCC, LPC   OT Team Member Selma Arrington, MARIS   Patient/Family Present   Patient Present No   Patient's Family Present No   Will d/c to [facility] and follow up with new vitae tomorrow at 1200. Social, bright, obtained urine.

## 2024-02-22 NOTE — NURSING NOTE
"Patient has been visible in the milieu throughout the morning. She is calm ad cooperative. Continues to agitate easily which appears to be her baseline. Social with select peers. Compliant with her scheduled medications. Her appetite is good. She reports she \"can't wait\" to discharge this afternoon. She denies Si/Hi and hallucinations at this time. She is able to make her needs known and offers no current complaints/ concerns. Plan of care continues. Q7 minute safety checks in progress.   "

## 2024-02-22 NOTE — BH TRANSITION RECORD
Contact Information: If you have any questions, concerns, pended studies, tests and/or procedures, or emergencies regarding your inpatient behavioral health visit. Please contact [facility] at [phone] and ask to speak to a , nurse or physician. A contact is available 24 hours/ 7 days a week at this number.     Summary of Procedures Performed During your Stay:  Below is a list of major procedures performed during your hospital stay and a summary of results:  - No major procedures performed.    Pending Studies (From admission, onward)      None          Please follow up on the above pending studies with your PCP and/or referring provider.

## 2024-03-03 ENCOUNTER — HOSPITAL ENCOUNTER (EMERGENCY)
Facility: HOSPITAL | Age: 54
Discharge: HOME | End: 2024-03-04
Attending: EMERGENCY MEDICINE
Payer: MEDICARE

## 2024-03-03 VITALS
SYSTOLIC BLOOD PRESSURE: 117 MMHG | BODY MASS INDEX: 43.55 KG/M2 | HEART RATE: 87 BPM | TEMPERATURE: 98.4 F | HEIGHT: 66 IN | WEIGHT: 271 LBS | DIASTOLIC BLOOD PRESSURE: 69 MMHG | RESPIRATION RATE: 19 BRPM | OXYGEN SATURATION: 93 %

## 2024-03-03 DIAGNOSIS — Z72.89 DELIBERATE SELF-CUTTING: ICD-10-CM

## 2024-03-03 DIAGNOSIS — R45.851 SUICIDAL IDEATION: Primary | ICD-10-CM

## 2024-03-03 LAB
ETHANOL EXG-MCNC: 0 MG/DL
FLUAV RNA RESP QL NAA+PROBE: NEGATIVE
FLUBV RNA RESP QL NAA+PROBE: NEGATIVE
RSV RNA RESP QL NAA+PROBE: NEGATIVE
SARS-COV-2 RNA RESP QL NAA+PROBE: NEGATIVE

## 2024-03-03 PROCEDURE — 99285 EMERGENCY DEPT VISIT HI MDM: CPT

## 2024-03-03 PROCEDURE — 82075 ASSAY OF BREATH ETHANOL: CPT | Performed by: EMERGENCY MEDICINE

## 2024-03-03 PROCEDURE — 0241U HB NFCT DS VIR RESP RNA 4 TRGT: CPT | Performed by: EMERGENCY MEDICINE

## 2024-03-03 PROCEDURE — 99284 EMERGENCY DEPT VISIT MOD MDM: CPT | Performed by: EMERGENCY MEDICINE

## 2024-03-03 PROCEDURE — G0426 INPT/ED TELECONSULT50: HCPCS | Performed by: GENERAL PRACTICE

## 2024-03-04 NOTE — ED NOTES
Patient unable to void at this time to complete UDS. Will reassess patient readiness at a later time.     Chaya Chu RN  03/03/24 [id number]

## 2024-03-04 NOTE — CONSULTS
TeleConsultation - Behavioral Health   Leydi Khan 54 y.o. female MRN: [id number]  Unit/Bed#: ED 06 Encounter: [id number]        REQUIRED DOCUMENTATION:     1. This service was provided via Telemedicine.  2. Provider located at [place].  3. TeleMed provider: Nidia Lagunas MD.  4. Identify all parties in room with patient during tele consult: Patient   5.Patient was then informed that this was a Telemedicine visit and that the exam was being conducted confidentially over secure lines. My office door was closed. No one else was in the room.  Patient acknowledged consent and understanding of privacy and security of the Telemedicine visit, and gave us permission to have the assistant stay in the room in order to assist with the history and to conduct the exam.  I informed the patient that I have reviewed their record in Epic and presented the opportunity for them to ask any questions regarding the visit today.  The patient agreed to participate.      Discussed with  Ana Maria Pappas MD      Assessment/Plan     Present on Admission:  **None**    Assessment:    Borderline Personality Disorder  PTSD  MDD, Moderate, Recurrent     BH High Utilizer Plan Reviewed. Patient presents with chronic suicidal ideation that is at baseline and no indication for voluntary or involuntary IP Psychiatric admission and stable for discharge home.      Treatment Plan:    Planned Medication Changes:    -None     Current Medications:         Risks / Benefits of Treatment:    Risks, benefits, and possible side effects of medications explained to patient and patient verbalizes understanding.      Other treatment modalities recommended as indicated:    psychotherapy  outpatient referral      Inpatient consult to Psychiatry  Consult performed by: Nidia Lagunas MD  Consult ordered by: Ana Maria Pappas MD      Physician Requesting Consult: Ana Maria Pappas*  Principal Problem:<principal problem not specified>    Reason for  Consult:  Psych Evaluation       History of Present Illness          Patient states that she is having suicidal ideation and that she is hearing voices that she should hit her head. Patient states that she is considering moving out of her group home. Patient states that her boyfriend won't answer his phone and that upsets her. Patient states that she started cutting herself again. Patient states that she is having active suicidal thoughts with a plan to cut her throat. Patient states that she is hear her own thoughts and ex-bfs voices as well that she is fat and ugly and doesn't deserve to live.      Psychiatric Review Of Systems:      sleep: yes  appetite changes: no  weight changes: no  energy/anergy: no  interest/pleasure/anhedonia: no  somatic symptoms: no  anxiety/panic: no  otoniel: no  guilty/hopeless: no  self injurious behavior/risky behavior: yes     Historical Information            Past Psychiatric History:      Psychiatric Hospitalizations:   Multiple past inpatient psychiatric admissions  Outpatient Treatment History:   current treatment with psychiatrist/Advanced Practitioner (Unsure )  Suicide Attempts:   None  History of self-harm:   None  Violence History:   no  Past Psychiatric medication trials: Multiple      Substance Abuse History: Denied         Family Psychiatric History:             Social History:     Education: high school diploma/GED  Learning Disabilities:  Denied   Marital history: single  Children: Denied   Living arrangement, social support: The patient lives in a group home under the supervision of Eileen.  Occupational History: on permanent disability  Functioning Relationships: good support system.  Other Pertinent History: None     Traumatic History:      Abuse: None  Other Traumatic Events: none    Past Medical History:   Diagnosis Date   • Anxiety    • Bipolar 1 disorder (HCC)    • Bipolar affective disorder, depressed, severe (HCC) 10/10/2021   • Borderline personality disorder  ([facility])    • CAD (coronary artery disease)    • Cognitive impairment    • Depression    • Dyslipidemia    • GERD (gastroesophageal reflux disease)    • Hypertension    • Obesity    • Psychiatric disorder    • Psychiatric illness    • PTSD (post-traumatic stress disorder)    • Schizoaffective disorder ([facility])    • Seizure ([facility])        Medical Review Of Systems:    Review of Systems    Meds/Allergies     all current active meds have been reviewed  Allergies   Allergen Reactions   • Fish Oil - Food Allergy Other (See Comments)     unknown   • Fish-Derived Products - Food Allergy Hives       Objective     Vital signs in last 24 hours:  Temp:  [98.4 °F (36.9 °C)] 98.4 °F (36.9 °C)  HR:  [87] 87  Resp:  [19] 19  BP: (117)/(69) 117/69    No intake or output data in the 24 hours ending 03/03/24 1953    Mental Status Evaluation:    Appearance:  age appropriate   Behavior:  normal   Speech:  normal pitch and normal volume   Mood:  dysthymic   Affect:  constricted   Language: naming objects   Thought Process:  normal   Associations intact associations   Thought Content:  normal   Perceptual Disturbances: None   Risk Potential: Suicidal Ideations none  Homicidal Ideations none  Potential for Aggression No   Sensorium:  person, place, and time/date   Cognition:  recent and remote memory grossly intact   Consciousness:  alert    Attention: attention span and concentration were age appropriate   Intellect: within normal limits   Fund of Knowledge: awareness of current events: President   Insight:  limited   Judgment: limited   Muscle Strength:  Muscle Tone: normal  NFT  normal   Gait/Station: normal gait/station   Motor Activity: no abnormal movements       Lab Results: I have personally reviewed all pertinent laboratory/tests results.     Most Recent Labs:   Lab Results   Component Value Date    WBC 8.24 02/11/2024    RBC 4.15 02/11/2024    HGB 12.7 02/11/2024    HCT 38.1 02/11/2024     02/11/2024    RDW 14.6 02/11/2024     NEUTROABS 5.36 02/11/2024    SODIUM 135 02/11/2024    K 4.1 02/11/2024    CL 98 02/11/2024    CO2 30 02/11/2024    BUN 14 02/11/2024    CREATININE 0.72 02/11/2024    GLUC 116 02/11/2024    GLUF 130 (H) 12/13/2022    CALCIUM 9.7 02/11/2024    AST 14 02/11/2024    ALT 15 02/11/2024    ALKPHOS 58 02/11/2024    TP 7.7 02/11/2024    ALB 4.1 02/11/2024    TBILI 0.47 02/11/2024    CHOLESTEROL 212 (H) 11/03/2023    HDL 42 (L) 11/03/2023    TRIG 223 (H) 11/03/2023    LDLCALC 125 (H) 11/03/2023    NONHDLC 170 11/03/2023    VALPROICTOT 46 (L) 02/15/2024    GRW4RMQLXQUF 2.768 02/11/2024    PREGSERUM Negative 04/28/2022    RPR Non-Reactive 12/13/2022    HGBA1C 4.5 08/08/2023    EAG 82 08/08/2023       Imaging Studies: No results found.  EKG/Pathology/Other Studies:   Lab Results   Component Value Date    VENTRATE 98 02/11/2024    ATRIALRATE 98 02/11/2024    PRINT 188 02/11/2024    QRSDINT 82 02/11/2024    QTINT 344 02/11/2024    QTCINT 439 02/11/2024    PAXIS 45 02/11/2024    QRSAXIS 37 02/11/2024    TWAVEAXIS 58 02/11/2024        Code Status: Prior  Advance Directive and Living Will:      Power of :    POLST:      Screenings:    1. Nutrition Screening  Nutrition Assessment (completed by Staff):      2. Pain Screening  Pain Screening: Pain Assessment  Pain Assessment Tool: 0-10  Pain Score: 0    3. Suicide Screening  ED Crisis Suicide Risk Assessment:        Counseling / Coordination of Care:    Total floor / unit time spent today 30 minutes. Greater than 50% of total time was spent with the patient and / or family counseling and / or coordination of care. A description of the counseling / coordination of care: Direct Patient Care, Chart Review, and Documentation.

## 2024-03-04 NOTE — ED NOTES
CW spoke with Kota Em to log psych consult request.    The attending psychiatrist is Dr Jannteh sutherland.    CW alerted pt's nurse Chaya VALENZUELA and Dr Pappas of the above.    SELENA Peterson, CIS ll  03/03/24 22:52

## 2024-03-04 NOTE — ED PROVIDER NOTES
History  Chief Complaint   Patient presents with    Psychiatric Evaluation     Pt to ED with c/o suicidal thoughts and took a plastic fork to cut her left arm. Denies HI.     54 year old female brought by EMS for evaluation after scratching her left forearm with a metal fork.  Patient states that while the staff was getting the other residents dinner, she took her fork into the bathroom to cut herself.  She states she feels suicidal due to issues with her boyfriend.  Patient has multiple prior ED visits for similar behavior.  Patient states she was given her Klonopin this evening, but that it did not help.      Psychiatric Evaluation      Prior to Admission Medications   Prescriptions Last Dose Informant Patient Reported? Taking?   Diclofenac Sodium (VOLTAREN) 1 %   No No   Sig: Apply 2 g topically 4 (four) times a day as needed (pain)   acetaminophen (TYLENOL) 325 mg tablet   No No   Sig: Take 2 tablets (650 mg total) by mouth every 4 (four) hours as needed for mild pain   ammonium lactate (LAC-HYDRIN) 12 % lotion   No No   Sig: Apply topically 2 (two) times a day   atorvastatin (LIPITOR) 10 mg tablet   No No   Sig: Take 1 tablet (10 mg total) by mouth daily at bedtime   cholecalciferol (VITAMIN D3) 1,000 units tablet   No No   Sig: Take 1 tablet (1,000 Units total) by mouth daily Do not start before April 30, 2024.   desvenlafaxine succinate (PRISTIQ) 100 mg 24 hr tablet   No No   Sig: Take 1 tablet (100 mg total) by mouth daily   divalproex sodium (DEPAKOTE) 500 mg DR tablet   No No   Sig: Take 2 tablets (1,000 mg total) by mouth daily at bedtime   ergocalciferol (VITAMIN D2) 50,000 units   No No   Sig: Take 1 capsule (50,000 Units total) by mouth once a week for 11 doses   gabapentin (NEURONTIN) 100 mg capsule   No No   Sig: Take 1 capsule (100 mg total) by mouth 3 (three) times a day   ibuprofen (MOTRIN) 600 mg tablet   No No   Sig: Take 1 tablet (600 mg total) by mouth every 6 (six) hours as needed for  headaches, fever or moderate pain   lidocaine (LIDODERM) 5 %   No No   Sig: Apply 1 patch topically over 12 hours daily as needed (Daily PRN) Remove & Discard patch within 12 hours or as directed by MD   lisinopril (ZESTRIL) 20 mg tablet   No No   Sig: Take 1 tablet (20 mg total) by mouth daily   meclizine (ANTIVERT) 12.5 MG tablet   No No   Sig: Take 1 tablet (12.5 mg total) by mouth every 8 (eight) hours as needed for dizziness   melatonin 3 mg   No No   Sig: Take 1 tablet (3 mg total) by mouth daily at bedtime   nicotine polacrilex (NICORETTE) 2 mg gum   No No   Sig: Chew 1 each (2 mg total) every 2 (two) hours as needed for smoking cessation   pantoprazole (PROTONIX) 20 mg tablet   No No   Sig: Take 1 tablet (20 mg total) by mouth daily   prazosin (MINIPRESS) 2 mg capsule   No No   Sig: Take 1 capsule (2 mg total) by mouth daily at bedtime   risperiDONE (RisperDAL) 3 mg tablet   No No   Sig: Take 1 tablet (3 mg total) by mouth 2 (two) times a day   traZODone (DESYREL) 50 mg tablet   No No   Sig: Take 1 tablet (50 mg total) by mouth daily at bedtime      Facility-Administered Medications: None       Past Medical History:   Diagnosis Date    Anxiety     Bipolar 1 disorder ([facility])     Bipolar affective disorder, depressed, severe (HCC) 10/10/2021    Borderline personality disorder ([facility])     CAD (coronary artery disease)     Cognitive impairment     Depression     Dyslipidemia     GERD (gastroesophageal reflux disease)     Hypertension     Obesity     Psychiatric disorder     Psychiatric illness     PTSD (post-traumatic stress disorder)     Schizoaffective disorder ([facility])     Seizure ([facility])        Past Surgical History:   Procedure Laterality Date    APPENDECTOMY      PATIENT DENIES HAVING APPENDIX REMOVED    CHOLECYSTECTOMY      FOOT SURGERY Right        Family History   Problem Relation Age of Onset    Kidney cancer Mother     Cerebral aneurysm Father      I have reviewed and agree with the history as  "documented.    E-Cigarette/Vaping    E-Cigarette Use Never User      E-Cigarette/Vaping Substances    Nicotine No     THC No     CBD No     Flavoring No     Other No     Unknown No      Social History     Tobacco Use    Smoking status: Every Day     Current packs/day: 0.25     Average packs/day: 0.3 packs/day for 0.1 years     Types: Cigarettes, Cigars     Start date: 2/5/2024    Smokeless tobacco: Never    Tobacco comments:     Pt stated \"smokes when stressed\"   Vaping Use    Vaping status: Never Used   Substance Use Topics    Alcohol use: Not Currently     Comment: occasionally    Drug use: Not Currently       Review of Systems    Physical Exam  Physical Exam  Vitals and nursing note reviewed.   Cardiovascular:      Rate and Rhythm: Normal rate and regular rhythm.      Pulses: Normal pulses.   Pulmonary:      Effort: Pulmonary effort is normal. No respiratory distress.   Abdominal:      General: There is no distension.      Palpations: Abdomen is soft.   Skin:     General: Skin is warm and dry.      Comments: Superficial scratch to left forearm without active bleeding.  No surrounding erythema or induration.   Neurological:      Mental Status: She is alert.         Vital Signs  ED Triage Vitals [03/03/24 2131]   Temperature Pulse Respirations Blood Pressure SpO2   98.4 °F (36.9 °C) 87 19 117/69 93 %      Temp Source Heart Rate Source Patient Position - Orthostatic VS BP Location FiO2 (%)   Temporal Monitor -- Right arm --      Pain Score       No Pain           Vitals:    03/03/24 2131   BP: 117/69   Pulse: 87         Visual Acuity      ED Medications  Medications - No data to display    Diagnostic Studies  Results Reviewed       Procedure Component Value Units Date/Time    FLU/RSV/COVID - if FLU/RSV clinically relevant [[id number]]  (Normal) Collected: 03/03/24 2141    Lab Status: Final result Specimen: Nares from Nose Updated: 03/03/24 2328     SARS-CoV-2 Negative     INFLUENZA A PCR Negative     INFLUENZA B PCR " Negative     RSV PCR Negative    Narrative:      FOR PEDIATRIC PATIENTS - copy/paste COVID Guidelines URL to browser: [email or web address]    SARS-CoV-2 assay is a Nucleic Acid Amplification assay intended for the  qualitative detection of nucleic acid from SARS-CoV-2 in nasopharyngeal  swabs. Results are for the presumptive identification of SARS-CoV-2 RNA.    Positive results are indicative of infection with SARS-CoV-2, the virus  causing COVID-19, but do not rule out bacterial infection or co-infection  with other viruses. Laboratories within the [place] and its  territories are required to report all positive results to the appropriate  public health authorities. Negative results do not preclude SARS-CoV-2  infection and should not be used as the sole basis for treatment or other  patient management decisions. Negative results must be combined with  clinical observations, patient history, and epidemiological information.  This test has not been FDA cleared or approved.    This test has been authorized by FDA under an Emergency Use Authorization  (EUA). This test is only authorized for the duration of time the  declaration that circumstances exist justifying the authorization of the  emergency use of an in vitro diagnostic tests for detection of SARS-CoV-2  virus and/or diagnosis of COVID-19 infection under section 564(b)(1) of  the Act, 21 U.S.C. 360bbb-3(b)(1), unless the authorization is terminated  or revoked sooner. The test has been validated but independent review by FDA  and CLIA is pending.    Test performed using [employer] GeneXpert: This RT-PCR assay targets N2,  a region unique to SARS-CoV-2. A conserved region in the E-gene was chosen  for pan-Sarbecovirus detection which includes SARS-CoV-2.    According to CMS-2020-01-R, this platform meets the definition of high-throughput technology.    POCT alcohol breath test [[id number]]  (Normal)  Resulted: 03/03/24 2144    Lab Status: Final result Updated: 03/03/24 2144     EXTBreath Alcohol 0.00                   No orders to display              Procedures  Procedures         ED Course  ED Course as of 03/03/24 2332   Sun Mar 03, 2024   2318 Patient seen by psychiatry.  Cleared for discharge.                               SBIRT 20yo+      Flowsheet Row Most Recent Value   Initial Alcohol Screen: US AUDIT-C     1. How often do you have a drink containing alcohol? 0 Filed at: 03/03/2024 2145   2. How many drinks containing alcohol do you have on a typical day you are drinking?  0 Filed at: 03/03/2024 2145   3a. Male UNDER 65: How often do you have five or more drinks on one occasion? 0 Filed at: 03/03/2024 2145   3b. FEMALE Any Age, or MALE 65+: How often do you have 4 or more drinks on one occassion? 0 Filed at: 03/03/2024 2145   Audit-C Score 0 Filed at: 03/03/2024 2145   DAT: How many times in the past year have you...    Used an illegal drug or used a prescription medication for non-medical reasons? Never Filed at: 03/03/2024 2145                      Medical Decision Making  54 year old female presents for evaluation of suicidal ideation after scratching her left forearm with a fork.  Wound is very superficial and does not require repair.  No signs of infection.  Last tetanus 2018.  Patient medically cleared.  Psychiatry consulted.  Patient cleared for discharge back to her facility.    Amount and/or Complexity of Data Reviewed  Labs: ordered.             Disposition  Final diagnoses:   Suicidal ideation   Deliberate self-cutting     Time reflects when diagnosis was documented in both MDM as applicable and the Disposition within this note       Time User Action Codes Description Comment    3/3/2024  9:34 PM Ana Maria Pappas [R45.851] Suicidal ideation     3/3/2024  9:35 PM Ana Maria Pappas [Z72.89] Deliberate self-cutting           ED Disposition       ED Disposition   Discharge     Condition   Stable    Date/Time   Sun Mar 3, 2024 2319    Comment   Leydi Khan discharge to home/self care.                   Follow-up Information       Follow up With Specialties Details Why Contact Info Additional Information    Sundar Lowe MD Internal Medicine  As needed [address]  [facility] [address]  [phone]        [facility] Emergency Medicine Go to  If symptoms worsen [address] [address] [phone] [facility], [address], [place] [address]            Patient's Medications   Discharge Prescriptions    No medications on file       No discharge procedures on file.    PDMP Review         Value Time User    PDMP Reviewed  Yes 2/12/2024 11:26 AM SHANI Hawk            ED Provider  Electronically Signed by             Ana Maria Pappas MD  03/03/24 1942

## 2024-03-14 ENCOUNTER — APPOINTMENT (EMERGENCY)
Dept: RADIOLOGY | Facility: HOSPITAL | Age: 54
End: 2024-03-14
Payer: MEDICARE

## 2024-03-14 ENCOUNTER — HOSPITAL ENCOUNTER (EMERGENCY)
Facility: HOSPITAL | Age: 54
Discharge: HOME | End: 2024-03-14
Attending: EMERGENCY MEDICINE
Payer: MEDICARE

## 2024-03-14 ENCOUNTER — OFFICE VISIT (OUTPATIENT)
Dept: CARDIOLOGY CLINIC | Facility: CLINIC | Age: 54
End: 2024-03-14
Payer: MEDICARE

## 2024-03-14 VITALS
TEMPERATURE: 98.3 F | HEART RATE: 82 BPM | SYSTOLIC BLOOD PRESSURE: 132 MMHG | RESPIRATION RATE: 18 BRPM | OXYGEN SATURATION: 99 % | DIASTOLIC BLOOD PRESSURE: 68 MMHG

## 2024-03-14 VITALS — DIASTOLIC BLOOD PRESSURE: 78 MMHG | SYSTOLIC BLOOD PRESSURE: 124 MMHG | HEART RATE: 99 BPM

## 2024-03-14 DIAGNOSIS — R42 LIGHTHEADEDNESS: ICD-10-CM

## 2024-03-14 DIAGNOSIS — R07.9 CHEST PAIN: ICD-10-CM

## 2024-03-14 DIAGNOSIS — R07.9 CHEST PAIN WITH LOW RISK OF ACUTE CORONARY SYNDROME: ICD-10-CM

## 2024-03-14 DIAGNOSIS — R42 LIGHTHEADEDNESS: Primary | ICD-10-CM

## 2024-03-14 DIAGNOSIS — E78.2 MIXED HYPERLIPIDEMIA: ICD-10-CM

## 2024-03-14 DIAGNOSIS — I10 PRIMARY HYPERTENSION: Primary | ICD-10-CM

## 2024-03-14 DIAGNOSIS — R11.0 NAUSEA: ICD-10-CM

## 2024-03-14 LAB
ALBUMIN SERPL BCP-MCNC: 4.3 G/DL (ref 3.5–5)
ALP SERPL-CCNC: 65 U/L (ref 34–104)
ALT SERPL W P-5'-P-CCNC: 20 U/L (ref 7–52)
ANION GAP SERPL CALCULATED.3IONS-SCNC: 8 MMOL/L (ref 4–13)
ANISOCYTOSIS BLD QL SMEAR: PRESENT
AST SERPL W P-5'-P-CCNC: 16 U/L (ref 13–39)
BASOPHILS # BLD MANUAL: 0 THOUSAND/UL (ref 0–0.1)
BASOPHILS NFR MAR MANUAL: 0 % (ref 0–1)
BILIRUB SERPL-MCNC: 0.48 MG/DL (ref 0.2–1)
BUN SERPL-MCNC: 10 MG/DL (ref 5–25)
CALCIUM SERPL-MCNC: 9.8 MG/DL (ref 8.4–10.2)
CARDIAC TROPONIN I PNL SERPL HS: <2 NG/L
CHLORIDE SERPL-SCNC: 96 MMOL/L (ref 96–108)
CO2 SERPL-SCNC: 32 MMOL/L (ref 21–32)
CREAT SERPL-MCNC: 0.42 MG/DL (ref 0.6–1.3)
EOSINOPHIL # BLD MANUAL: 0.2 THOUSAND/UL (ref 0–0.4)
EOSINOPHIL NFR BLD MANUAL: 3 % (ref 0–6)
ERYTHROCYTE [DISTWIDTH] IN BLOOD BY AUTOMATED COUNT: 14.8 % (ref 11.6–15.1)
GFR SERPL CREATININE-BSD FRML MDRD: 117 ML/MIN/1.73SQ M
GLUCOSE SERPL-MCNC: 175 MG/DL (ref 65–140)
HCT VFR BLD AUTO: 38.9 % (ref 34.8–46.1)
HGB BLD-MCNC: 12.9 G/DL (ref 11.5–15.4)
LYMPHOCYTES # BLD AUTO: 1.97 THOUSAND/UL (ref 0.6–4.47)
LYMPHOCYTES # BLD AUTO: 29 % (ref 14–44)
MCH RBC QN AUTO: 31.3 PG (ref 26.8–34.3)
MCHC RBC AUTO-ENTMCNC: 33.2 G/DL (ref 31.4–37.4)
MCV RBC AUTO: 94 FL (ref 82–98)
METAMYELOCYTES NFR BLD MANUAL: 1 % (ref 0–1)
MONOCYTES # BLD AUTO: 0.41 THOUSAND/UL (ref 0–1.22)
MONOCYTES NFR BLD: 6 % (ref 4–12)
NEUTROPHILS # BLD MANUAL: 4.14 THOUSAND/UL (ref 1.85–7.62)
NEUTS BAND NFR BLD MANUAL: 1 % (ref 0–8)
NEUTS SEG NFR BLD AUTO: 60 % (ref 43–75)
PLATELET # BLD AUTO: 174 THOUSANDS/UL (ref 149–390)
PLATELET BLD QL SMEAR: ADEQUATE
PMV BLD AUTO: 9.8 FL (ref 8.9–12.7)
POLYCHROMASIA BLD QL SMEAR: PRESENT
POTASSIUM SERPL-SCNC: 3.7 MMOL/L (ref 3.5–5.3)
PROT SERPL-MCNC: 8.2 G/DL (ref 6.4–8.4)
RBC # BLD AUTO: 4.12 MILLION/UL (ref 3.81–5.12)
RBC MORPH BLD: PRESENT
SODIUM SERPL-SCNC: 136 MMOL/L (ref 135–147)
WBC # BLD AUTO: 6.78 THOUSAND/UL (ref 4.31–10.16)

## 2024-03-14 PROCEDURE — 84484 ASSAY OF TROPONIN QUANT: CPT

## 2024-03-14 PROCEDURE — 99285 EMERGENCY DEPT VISIT HI MDM: CPT

## 2024-03-14 PROCEDURE — 99284 EMERGENCY DEPT VISIT MOD MDM: CPT

## 2024-03-14 PROCEDURE — 85027 COMPLETE CBC AUTOMATED: CPT

## 2024-03-14 PROCEDURE — 36415 COLL VENOUS BLD VENIPUNCTURE: CPT

## 2024-03-14 PROCEDURE — 93005 ELECTROCARDIOGRAM TRACING: CPT

## 2024-03-14 PROCEDURE — 99204 OFFICE O/P NEW MOD 45 MIN: CPT | Performed by: INTERNAL MEDICINE

## 2024-03-14 PROCEDURE — 80053 COMPREHEN METABOLIC PANEL: CPT

## 2024-03-14 PROCEDURE — 85007 BL SMEAR W/DIFF WBC COUNT: CPT

## 2024-03-14 PROCEDURE — 93000 ELECTROCARDIOGRAM COMPLETE: CPT | Performed by: INTERNAL MEDICINE

## 2024-03-14 PROCEDURE — 71045 X-RAY EXAM CHEST 1 VIEW: CPT

## 2024-03-14 RX ORDER — BENZTROPINE MESYLATE 0.5 MG/1
TABLET ORAL
COMMUNITY
Start: 2024-03-07

## 2024-03-14 RX ORDER — ONDANSETRON 4 MG/1
4 TABLET, ORALLY DISINTEGRATING ORAL EVERY 6 HOURS PRN
Qty: 12 TABLET | Refills: 0 | Status: SHIPPED | OUTPATIENT
Start: 2024-03-14

## 2024-03-14 RX ORDER — ESCITALOPRAM OXALATE 10 MG/1
TABLET ORAL
COMMUNITY
Start: 2024-03-12

## 2024-03-14 RX ORDER — CLONAZEPAM 0.5 MG/1
TABLET ORAL
COMMUNITY
Start: 2024-02-22

## 2024-03-14 RX ORDER — CLONAZEPAM 1 MG/1
TABLET ORAL
COMMUNITY
Start: 2024-02-22

## 2024-03-14 RX ORDER — LOPERAMIDE HYDROCHLORIDE 2 MG/1
CAPSULE ORAL
COMMUNITY
Start: 2024-02-28

## 2024-03-14 NOTE — PROGRESS NOTES
[facility] Cardiology  Office Consultation  Leydi Khan 54 y.o. female MRN: [id number]        Chief Complaint    Chief Complaint   Patient presents with    Dizziness     Very dizzy at the moment    New Patient Visit      lethargic at the moment, minimal communication from pt       Referring Provider: Ana Maria Pappas*    Impression & Plan:    1. Chest pain  Unclear etiology. Patient is poor historian. She has significant psychiatric illness. It is unclear if she will tolerate stress testing. If we can slow down her heart rate we can consider cardiac CTA as this would be a shorter test in terms of scanner duration. Otherwise we may consider NM SPECT pharmacologic. I do not think it would be safe to treadmill her given her frequent lightheadedness, unsteadiness on her feet, morbid obesity / deconditioning, and psychiatric comorbidities. As we are sending patient to ER will defer test ordering.   - Ambulatory Referral to Cardiology  - POCT ECG    2. Primary hypertension  Controlled    3. Mixed hyperlipidemia  Continue statin    4. Lightheadedness  Patient c/o lightheadedness. Orthostatics are negative. BP WNL. EKG in office unremarkable with NSR 99 bpm. However patient is requesting we call 9-1-1 for transport to the ER.  EMS called to office to transport patient to ER.         We will see Leydi Khan back TBD    HPI: Leydi Khan is a 54 y.o. year old female with HTN, HLD, borderline personality disorder, morbid obesity, high utilizer of emergency services for psychiatric reasons, she is partially dependent for ADLs, presenting for chest pain.     She was seen in the ER on 8/26/23 for chest pain. There are 12 admissions or ER visits since mostly psychiatric. She states she has been having the chest pain for 2 weeks. When asked about her ER visit in Aug 2023, she then states she have been having chest pain since then. She gets the pain walking up or down steps or takes deep breaths. The pain feels like a stabbing  "sensation.     She has been having swelling in her legs for \"a long time\".     She is currently vertiginous.     Orthostatics negative in the office.   Supine 116/70  Sitting 112/78  Standing 124/70    Patient reports currently feeling presyncopal but states she feels like this all the time. She then states she needs to go to the emergency room.         Cardiac testing:     EKG reviewed personally:   EKG 3/14/24 - NSR, 99 bpm, normal axis and intervals. Normal study.       Relevant Laboratory Studies:  (Laboratory studies personally reviewed)    CMP 2/11/24 - Cr 0.72, K 4.1  Lipid panel 11/3/23 -  mg/dL  CBC 2/11/24 - Hgb 12.7    Review of Systems      Past Medical History:   Diagnosis Date    Anxiety     Bipolar 1 disorder (HCC)     Bipolar affective disorder, depressed, severe (HCC) 10/10/2021    Borderline personality disorder (HCC)     CAD (coronary artery disease)     Cognitive impairment     Depression     Dyslipidemia     GERD (gastroesophageal reflux disease)     Hypertension     Obesity     Psychiatric disorder     Psychiatric illness     PTSD (post-traumatic stress disorder)     Schizoaffective disorder (HCC)     Seizure (HCC)      Past Surgical History:   Procedure Laterality Date    APPENDECTOMY      PATIENT DENIES HAVING APPENDIX REMOVED    CHOLECYSTECTOMY      FOOT SURGERY Right      Social History     Substance and Sexual Activity   Alcohol Use Not Currently    Comment: occasionally     Social History     Substance and Sexual Activity   Drug Use Not Currently     Social History     Tobacco Use   Smoking Status Every Day    Current packs/day: 0.25    Average packs/day: 0.3 packs/day for 0.1 years    Types: Cigarettes, Cigars    Start date: 2/5/2024   Smokeless Tobacco Never   Tobacco Comments    Pt stated \"smokes when stressed\"     Family History   Problem Relation Age of Onset    Kidney cancer Mother     Cerebral aneurysm Father        Allergies:  Allergies   Allergen Reactions    Fish Oil - " Food Allergy Other (See Comments)     unknown    Fish-Derived Products - Food Allergy Hives       Medications (as of START of this encounter):   Outpatient Medications Prior to Visit   Medication Sig Dispense Refill    acetaminophen (TYLENOL) 325 mg tablet Take 2 tablets (650 mg total) by mouth every 4 (four) hours as needed for mild pain 90 tablet 0    ammonium lactate (LAC-HYDRIN) 12 % lotion Apply topically 2 (two) times a day 225 g 0    atorvastatin (LIPITOR) 10 mg tablet Take 1 tablet (10 mg total) by mouth daily at bedtime 30 tablet 0    benztropine (COGENTIN) 0.5 mg tablet       [START ON 4/30/2024] cholecalciferol (VITAMIN D3) 1,000 units tablet Take 1 tablet (1,000 Units total) by mouth daily Do not start before April 30, 2024. 30 tablet 0    clonazePAM (KlonoPIN) 0.5 mg tablet       desvenlafaxine succinate (PRISTIQ) 100 mg 24 hr tablet Take 1 tablet (100 mg total) by mouth daily 30 tablet 0    Diclofenac Sodium (VOLTAREN) 1 % Apply 2 g topically 4 (four) times a day as needed (pain) 350 g 0    divalproex sodium (DEPAKOTE) 500 mg DR tablet Take 2 tablets (1,000 mg total) by mouth daily at bedtime 60 tablet 0    ergocalciferol (VITAMIN D2) 50,000 units Take 1 capsule (50,000 Units total) by mouth once a week for 11 doses 10 capsule 0    escitalopram (LEXAPRO) 10 mg tablet       gabapentin (NEURONTIN) 100 mg capsule Take 1 capsule (100 mg total) by mouth 3 (three) times a day 90 capsule 0    ibuprofen (MOTRIN) 600 mg tablet Take 1 tablet (600 mg total) by mouth every 6 (six) hours as needed for headaches, fever or moderate pain 60 tablet 0    lidocaine (LIDODERM) 5 % Apply 1 patch topically over 12 hours daily as needed (Daily PRN) Remove & Discard patch within 12 hours or as directed by MD 30 patch 0    lisinopril (ZESTRIL) 20 mg tablet Take 1 tablet (20 mg total) by mouth daily 30 tablet 0    loperamide (IMODIUM) 2 mg capsule       meclizine (ANTIVERT) 12.5 MG tablet Take 1 tablet (12.5 mg total) by mouth  "every 8 (eight) hours as needed for dizziness 30 tablet 0    melatonin 3 mg Take 1 tablet (3 mg total) by mouth daily at bedtime 30 tablet 0    nicotine polacrilex (NICORETTE) 2 mg gum Chew 1 each (2 mg total) every 2 (two) hours as needed for smoking cessation 100 each 0    pantoprazole (PROTONIX) 20 mg tablet Take 1 tablet (20 mg total) by mouth daily 30 tablet 0    prazosin (MINIPRESS) 2 mg capsule Take 1 capsule (2 mg total) by mouth daily at bedtime 30 capsule 0    risperiDONE (RisperDAL) 3 mg tablet Take 1 tablet (3 mg total) by mouth 2 (two) times a day 60 tablet 0    traZODone (DESYREL) 50 mg tablet Take 1 tablet (50 mg total) by mouth daily at bedtime 30 tablet 0    clonazePAM (KlonoPIN) 1 mg tablet  (Patient not taking: Reported on 3/14/2024)       No facility-administered medications prior to visit.         Vitals:    03/14/24 0845   BP: 124/78   Pulse: 99     Weight (last 2 days)       None            General: Leydi Khan is a morbidly obese, unkempt female, with food and saliva smeared across her face  HEENT: deferred  Neck: deferred  Cardiovascular: unremarkable S1/S2, regular rate and rhythm, no murmurs, rubs or gallops   Pulmonary: normal respiratory effort, CTAB   Abdomen: obese  Extremities: +lower extremity edema. Warm and well perfused extremities.   Neuro: deferred  Psych:deferred        Time Spent:  Total time (face-to-face and non-face-to-face) spent on today's visit was 55 minutes. This includes preparation for the visits (i.e. reviewing test results), performance of a medically appropriate history and examination, and orders for medications, tests or other procedures. This time is exclusive of procedures performed and time spent teaching.    Americo Mckinney MD    This note was completed in part utilizing [employer] recognition software. Grammatical errors, random word insertion, spelling mistakes, occasional wrong word or \"sound-alike\" substitutions and incomplete sentences may be an " occasional consequence of the system secondary to software limitations, ambient noise and hardware issues. At the time of dictation, efforts were made to edit, clarify and /or correct errors.  Please read the chart carefully and recognize, using context, where substitutions have occurred.  If you have any questions or concerns about the context, text or information contained within the body of this dictation, please contact myself, the provider, for further clarification.

## 2024-03-14 NOTE — ED PROVIDER NOTES
History  Chief Complaint   Patient presents with    Vertigo - Recurrent     Patient presents to the ED with c/o dizziness, states was in ED last for chest pain and was told to f/u with cardiologist. Her f/u appt was today and told them she has a hx of vertigo but it was slightly worse today.  took meclizine this AM      This is a 55 y/o female with PMHbipolar disorder, schizoaffective disorder, obesity, HTN, HLD who presents to the ER for lightheadedness and chest pain x 2 weeks. Patient was at the cardiologist office today for a referral from a previous ER visit and requested to be sent to the ER. Patient states the lightheadedness is the sensation that she might pass out, denies any head or room spinning sensation. It has been intermittently happening for the past two weeks.  Denies abdominal pain, nausea, vomiting, visual disturbance, headache, numbness, tingling, weakness, palpitations. Has chronic bilateral leg swelling, no new changes. No recent travel, hospitalizations or surgeries.       History provided by:  Patient   used: No        Prior to Admission Medications   Prescriptions Last Dose Informant Patient Reported? Taking?   Diclofenac Sodium (VOLTAREN) 1 %  Self No No   Sig: Apply 2 g topically 4 (four) times a day as needed (pain)   acetaminophen (TYLENOL) 325 mg tablet  Self No No   Sig: Take 2 tablets (650 mg total) by mouth every 4 (four) hours as needed for mild pain   ammonium lactate (LAC-HYDRIN) 12 % lotion  Self No No   Sig: Apply topically 2 (two) times a day   atorvastatin (LIPITOR) 10 mg tablet  Self No No   Sig: Take 1 tablet (10 mg total) by mouth daily at bedtime   benztropine (COGENTIN) 0.5 mg tablet  Self Yes No   cholecalciferol (VITAMIN D3) 1,000 units tablet  Self No No   Sig: Take 1 tablet (1,000 Units total) by mouth daily Do not start before April 30, 2024.   clonazePAM (KlonoPIN) 0.5 mg tablet  Self Yes No   clonazePAM (KlonoPIN) 1 mg tablet  Self Yes No    Patient not taking: Reported on 3/14/2024   desvenlafaxine succinate (PRISTIQ) 100 mg 24 hr tablet  Self No No   Sig: Take 1 tablet (100 mg total) by mouth daily   divalproex sodium (DEPAKOTE) 500 mg DR tablet  Self No No   Sig: Take 2 tablets (1,000 mg total) by mouth daily at bedtime   ergocalciferol (VITAMIN D2) 50,000 units  Self No No   Sig: Take 1 capsule (50,000 Units total) by mouth once a week for 11 doses   escitalopram (LEXAPRO) 10 mg tablet  Self Yes No   gabapentin (NEURONTIN) 100 mg capsule  Self No No   Sig: Take 1 capsule (100 mg total) by mouth 3 (three) times a day   ibuprofen (MOTRIN) 600 mg tablet  Self No No   Sig: Take 1 tablet (600 mg total) by mouth every 6 (six) hours as needed for headaches, fever or moderate pain   lidocaine (LIDODERM) 5 %  Self No No   Sig: Apply 1 patch topically over 12 hours daily as needed (Daily PRN) Remove & Discard patch within 12 hours or as directed by MD   lisinopril (ZESTRIL) 20 mg tablet  Self No No   Sig: Take 1 tablet (20 mg total) by mouth daily   loperamide (IMODIUM) 2 mg capsule  Self Yes No   meclizine (ANTIVERT) 12.5 MG tablet  Self No No   Sig: Take 1 tablet (12.5 mg total) by mouth every 8 (eight) hours as needed for dizziness   melatonin 3 mg  Self No No   Sig: Take 1 tablet (3 mg total) by mouth daily at bedtime   nicotine polacrilex (NICORETTE) 2 mg gum  Self No No   Sig: Chew 1 each (2 mg total) every 2 (two) hours as needed for smoking cessation   pantoprazole (PROTONIX) 20 mg tablet  Self No No   Sig: Take 1 tablet (20 mg total) by mouth daily   prazosin (MINIPRESS) 2 mg capsule  Self No No   Sig: Take 1 capsule (2 mg total) by mouth daily at bedtime   risperiDONE (RisperDAL) 3 mg tablet  Self No No   Sig: Take 1 tablet (3 mg total) by mouth 2 (two) times a day   traZODone (DESYREL) 50 mg tablet  Self No No   Sig: Take 1 tablet (50 mg total) by mouth daily at bedtime      Facility-Administered Medications: None       Past Medical History:  "  Diagnosis Date    Anxiety     Bipolar 1 disorder (HCC)     Bipolar affective disorder, depressed, severe (HCC) 10/10/2021    Borderline personality disorder (HCC)     CAD (coronary artery disease)     Cognitive impairment     Depression     Dyslipidemia     GERD (gastroesophageal reflux disease)     Hypertension     Obesity     Psychiatric disorder     Psychiatric illness     PTSD (post-traumatic stress disorder)     Schizoaffective disorder (HCC)     Seizure (HCC)        Past Surgical History:   Procedure Laterality Date    APPENDECTOMY      PATIENT DENIES HAVING APPENDIX REMOVED    CHOLECYSTECTOMY      FOOT SURGERY Right        Family History   Problem Relation Age of Onset    Kidney cancer Mother     Cerebral aneurysm Father      I have reviewed and agree with the history as documented.    E-Cigarette/Vaping    E-Cigarette Use Never User      E-Cigarette/Vaping Substances    Nicotine No     THC No     CBD No     Flavoring No     Other No     Unknown No      Social History     Tobacco Use    Smoking status: Former     Current packs/day: 0.25     Average packs/day: 0.3 packs/day for 0.1 years     Types: Cigarettes, Cigars     Start date: 2/5/2024    Smokeless tobacco: Never    Tobacco comments:     Pt stated \"smokes when stressed\"   Vaping Use    Vaping status: Never Used   Substance Use Topics    Alcohol use: Not Currently     Comment: occasionally    Drug use: Not Currently       Review of Systems   Constitutional:  Negative for chills and fever.   HENT:  Negative for congestion.    Respiratory:  Negative for cough, chest tightness and shortness of breath.    Cardiovascular:  Positive for chest pain and leg swelling (chronic). Negative for palpitations.   Gastrointestinal:  Negative for abdominal pain, nausea and vomiting.   Skin:  Negative for color change.   Neurological:  Positive for light-headedness. Negative for dizziness, weakness, numbness and headaches.   Psychiatric/Behavioral:  Negative for " behavioral problems and sleep disturbance.    All other systems reviewed and are negative.      Physical Exam  Physical Exam  Vitals and nursing note reviewed.   Constitutional:       General: She is awake.      Appearance: Normal appearance. She is well-developed. She is obese.   HENT:      Head: Normocephalic and atraumatic.      Right Ear: External ear normal.      Left Ear: External ear normal.      Nose: Nose normal.   Eyes:      General: No scleral icterus.     Extraocular Movements: Extraocular movements intact.   Cardiovascular:      Rate and Rhythm: Normal rate and regular rhythm.      Heart sounds: Normal heart sounds, S1 normal and S2 normal. No murmur heard.     No gallop.   Pulmonary:      Effort: Pulmonary effort is normal.      Breath sounds: Normal breath sounds. No wheezing, rhonchi or rales.   Chest:       Abdominal:      General: Abdomen is protuberant. Bowel sounds are normal.      Palpations: Abdomen is soft.      Tenderness: There is no abdominal tenderness.   Musculoskeletal:         General: Normal range of motion.      Cervical back: Normal range of motion.   Skin:     General: Skin is warm and dry.   Neurological:      General: No focal deficit present.      Mental Status: She is alert and oriented to person, place, and time.      GCS: GCS eye subscore is 4. GCS verbal subscore is 5. GCS motor subscore is 6.      Cranial Nerves: No facial asymmetry.      Sensory: Sensation is intact.      Motor: Motor function is intact.   Psychiatric:         Attention and Perception: Attention and perception normal.         Mood and Affect: Mood normal.         Behavior: Behavior normal. Behavior is cooperative.         Vital Signs  ED Triage Vitals   Temperature Pulse Respirations Blood Pressure SpO2   03/14/24 0956 03/14/24 0956 03/14/24 0956 03/14/24 0956 03/14/24 0956   98.3 °F (36.8 °C) 95 18 140/72 97 %      Temp Source Heart Rate Source Patient Position - Orthostatic VS BP Location FiO2 (%)    03/14/24 1125 03/14/24 0956 03/14/24 1125 03/14/24 1145 --   Oral Monitor Lying Left arm       Pain Score       03/14/24 0955       No Pain           Vitals:    03/14/24 0956 03/14/24 1125 03/14/24 1145   BP: 140/72 129/63 132/68   Pulse: 95 83 82   Patient Position - Orthostatic VS:  Lying Lying         Visual Acuity  Visual Acuity      Flowsheet Row Most Recent Value   L Pupil Size (mm) 3   R Pupil Size (mm) 3            ED Medications  Medications - No data to display    Diagnostic Studies  Results Reviewed       Procedure Component Value Units Date/Time    RBC Morphology Reflex Test [[id number]] Collected: 03/14/24 1047    Lab Status: Final result Specimen: Blood from Arm, Left Updated: 03/14/24 1201    Comprehensive metabolic panel [[id number]]  (Abnormal) Collected: 03/14/24 1047    Lab Status: Final result Specimen: Blood from Arm, Left Updated: 03/14/24 1143     Sodium 136 mmol/L      Potassium 3.7 mmol/L      Chloride 96 mmol/L      CO2 32 mmol/L      ANION GAP 8 mmol/L      BUN 10 mg/dL      Creatinine 0.42 mg/dL      Glucose 175 mg/dL      Calcium 9.8 mg/dL      AST 16 U/L      ALT 20 U/L      Alkaline Phosphatase 65 U/L      Total Protein 8.2 g/dL      Albumin 4.3 g/dL      Total Bilirubin 0.48 mg/dL      eGFR 117 ml/min/1.73sq m     Narrative:      National Kidney Disease Foundation guidelines for Chronic Kidney Disease (CKD):     Stage 1 with normal or high GFR (GFR > 90 mL/min/1.73 square meters)    Stage 2 Mild CKD (GFR = 60-89 mL/min/1.73 square meters)    Stage 3A Moderate CKD (GFR = 45-59 mL/min/1.73 square meters)    Stage 3B Moderate CKD (GFR = 30-44 mL/min/1.73 square meters)    Stage 4 Severe CKD (GFR = 15-29 mL/min/1.73 square meters)    Stage 5 End Stage CKD (GFR <15 mL/min/1.73 square meters)  Note: GFR calculation is accurate only with a steady state creatinine    CBC and differential [[id number]]  (Normal) Collected: 03/14/24 1047    Lab Status: Final result Specimen: Blood from Arm, Left  Updated: 03/14/24 1124     WBC 6.78 Thousand/uL      RBC 4.12 Million/uL      Hemoglobin 12.9 g/dL      Hematocrit 38.9 %      MCV 94 fL      MCH 31.3 pg      MCHC 33.2 g/dL      RDW 14.8 %      MPV 9.8 fL      Platelets 174 Thousands/uL     Narrative:      This is an appended report.  These results have been appended to a previously verified report.    Manual Differential(PHLEBS Do Not Order) [[id number]] Collected: 03/14/24 1047    Lab Status: Final result Specimen: Blood from Arm, Left Updated: 03/14/24 1124     Segmented % 60 %      Bands % 1 %      Lymphocytes % 29 %      Monocytes % 6 %      Eosinophils % 3 %      Basophils % 0 %      Metamyelocytes % 1 %      Absolute Neutrophils 4.14 Thousand/uL      Absolute Lymphocytes 1.97 Thousand/uL      Absolute Monocytes 0.41 Thousand/uL      Absolute Eosinophils 0.20 Thousand/uL      Absolute Basophils 0.00 Thousand/uL      Total Counted --     RBC Morphology Present     Platelet Estimate Adequate     Anisocytosis Present     Polychromasia Present    HS Troponin 0hr (reflex protocol) [[id number]]  (Normal) Collected: 03/14/24 1047    Lab Status: Final result Specimen: Blood from Arm, Left Updated: 03/14/24 1120     hs TnI 0hr <2 ng/L                    XR chest 1 view portable   ED Interpretation by Jessica Oneil PA-C (03/14 1151)   No acute cardiopulmonary disease. No changes when compared to CXR from 8/26/23      Final Result by Joaquin Quiros MD (03/14 1342)      No acute cardiopulmonary disease.            Workstation performed: [id number]                    Procedures  Procedures         ED Course             HEART Risk Score      Flowsheet Row Most Recent Value   Heart Score Risk Calculator    History 0 Filed at: 03/14/2024 1612   ECG 0 Filed at: 03/14/2024 1612   Age 1 Filed at: 03/14/2024 1612   Risk Factors 1 Filed at: 03/14/2024 1612   Troponin 0 Filed at: 03/14/2024 1612   HEART Score 2 Filed at: 03/14/2024 1612                          SBIRT 20yo+       Flowsheet Row Most Recent Value   Initial Alcohol Screen: US AUDIT-C     1. How often do you have a drink containing alcohol? 0 Filed at: 03/14/2024 0956   2. How many drinks containing alcohol do you have on a typical day you are drinking?  0 Filed at: 03/14/2024 0956   3a. Male UNDER 65: How often do you have five or more drinks on one occasion? 0 Filed at: 03/14/2024 0956   3b. FEMALE Any Age, or MALE 65+: How often do you have 4 or more drinks on one occassion? 0 Filed at: 03/14/2024 0956   Audit-C Score 0 Filed at: 03/14/2024 0956   DAT: How many times in the past year have you...    Used an illegal drug or used a prescription medication for non-medical reasons? Never Filed at: 03/14/2024 0956                      Medical Decision Making  53 y/o female here for chest pain, lightheadedness intermittently x 2 weeks  Differential diagnosis including but not limited to: ACS, arrythmia, dehydration, MARIE, electrolyte abnormality, metabolic disturbance, pneumonia, vertigo  Plan: heart score of 2. Labs unremarkable. Advised PCP f/u. Prescribed zofran for nausea as needed patient  was given strict return to ER precautions both verbally and in discharge papers. Patient verbalized understanding and agrees with plan.      Amount and/or Complexity of Data Reviewed  Labs: ordered.  Radiology: ordered and independent interpretation performed.    Risk  Prescription drug management.             Disposition  Final diagnoses:   Lightheadedness   Nausea   Chest pain with low risk of acute coronary syndrome     Time reflects when diagnosis was documented in both MDM as applicable and the Disposition within this note       Time User Action Codes Description Comment    3/14/2024 12:04 PM Jessica Oneil Add [R42] Lightheadedness     3/14/2024 12:04 PM Jessica Oneil Add [R07.9] Chest pain     3/14/2024 12:04 PM Jessica Oneil Add [R11.0] Nausea     3/14/2024  4:12 PM Jessica Oneil Remove [R07.9] Chest pain     3/14/2024   4:12 PM Jessica Oneil [R07.9] Chest pain with low risk of acute coronary syndrome           ED Disposition       ED Disposition   Discharge    Condition   Stable    Date/Time   Thu Mar 14, 2024 1204    Comment   Leydi Khan discharge to home/self care.                   Follow-up Information       Follow up With Specialties Details Why Contact Info Additional Information    Sundar Lowe MD Internal Medicine Call  As needed [address]  [address]  [facility] [id number] [phone]        [facility] Emergency Department Emergency Medicine Go to  If symptoms worsen [address] [facility] [address] [phone] [facility] Emergency Department, [address] [place], [place] [address]            Discharge Medication List as of 3/14/2024 12:06 PM        START taking these medications    Details   ondansetron (ZOFRAN-ODT) 4 mg disintegrating tablet Take 1 tablet (4 mg total) by mouth every 6 (six) hours as needed for nausea or vomiting, Starting Thu 3/14/2024, Normal           CONTINUE these medications which have NOT CHANGED    Details   acetaminophen (TYLENOL) 325 mg tablet Take 2 tablets (650 mg total) by mouth every 4 (four) hours as needed for mild pain, Starting Mon 2/19/2024, Normal      ammonium lactate (LAC-HYDRIN) 12 % lotion Apply topically 2 (two) times a day, Starting Mon 2/19/2024, Normal      atorvastatin (LIPITOR) 10 mg tablet Take 1 tablet (10 mg total) by mouth daily at bedtime, Starting Mon 2/19/2024, Normal      benztropine (COGENTIN) 0.5 mg tablet Historical Med      cholecalciferol (VITAMIN D3) 1,000 units tablet Take 1 tablet (1,000 Units total) by mouth daily Do not start before April 30, 2024., Starting Tue 4/30/2024, Normal      !! clonazePAM (KlonoPIN) 0.5 mg tablet Historical Med      !! clonazePAM (KlonoPIN) 1 mg tablet Historical Med      desvenlafaxine succinate (PRISTIQ) 100 mg 24 hr  tablet Take 1 tablet (100 mg total) by mouth daily, Starting Thu 2/22/2024, Normal      Diclofenac Sodium (VOLTAREN) 1 % Apply 2 g topically 4 (four) times a day as needed (pain), Starting Mon 2/19/2024, Normal      divalproex sodium (DEPAKOTE) 500 mg DR tablet Take 2 tablets (1,000 mg total) by mouth daily at bedtime, Starting Wed 2/21/2024, Normal      ergocalciferol (VITAMIN D2) 50,000 units Take 1 capsule (50,000 Units total) by mouth once a week for 11 doses, Starting Tue 2/20/2024, Until Wed 5/1/2024, Normal      escitalopram (LEXAPRO) 10 mg tablet Historical Med      gabapentin (NEURONTIN) 100 mg capsule Take 1 capsule (100 mg total) by mouth 3 (three) times a day, Starting Wed 2/21/2024, Normal      ibuprofen (MOTRIN) 600 mg tablet Take 1 tablet (600 mg total) by mouth every 6 (six) hours as needed for headaches, fever or moderate pain, Starting Mon 2/19/2024, Normal      lidocaine (LIDODERM) 5 % Apply 1 patch topically over 12 hours daily as needed (Daily PRN) Remove & Discard patch within 12 hours or as directed by MD, Starting Mon 2/19/2024, Normal      lisinopril (ZESTRIL) 20 mg tablet Take 1 tablet (20 mg total) by mouth daily, Starting Mon 2/19/2024, Normal      loperamide (IMODIUM) 2 mg capsule Historical Med      meclizine (ANTIVERT) 12.5 MG tablet Take 1 tablet (12.5 mg total) by mouth every 8 (eight) hours as needed for dizziness, Starting Mon 2/19/2024, Normal      melatonin 3 mg Take 1 tablet (3 mg total) by mouth daily at bedtime, Starting Wed 2/21/2024, Normal      nicotine polacrilex (NICORETTE) 2 mg gum Chew 1 each (2 mg total) every 2 (two) hours as needed for smoking cessation, Starting Mon 2/19/2024, Normal      pantoprazole (PROTONIX) 20 mg tablet Take 1 tablet (20 mg total) by mouth daily, Starting Mon 2/19/2024, Normal      prazosin (MINIPRESS) 2 mg capsule Take 1 capsule (2 mg total) by mouth daily at bedtime, Starting Mon 2/19/2024, Until Wed 3/20/2024, Normal      risperiDONE  (RisperDAL) 3 mg tablet Take 1 tablet (3 mg total) by mouth 2 (two) times a day, Starting Wed 2/21/2024, Normal      traZODone (DESYREL) 50 mg tablet Take 1 tablet (50 mg total) by mouth daily at bedtime, Starting Wed 2/21/2024, Normal       !! - Potential duplicate medications found. Please discuss with provider.          No discharge procedures on file.    PDMP Review         Value Time User    PDMP Reviewed  Yes 2/12/2024 11:26 AM SHANI Hawk            ED Provider  Electronically Signed by             Jessica Oneil PA-C  03/14/24 7010

## 2024-03-15 LAB
ATRIAL RATE: 93 BPM
P AXIS: 58 DEGREES
PR INTERVAL: 202 MS
QRS AXIS: 63 DEGREES
QRSD INTERVAL: 84 MS
QT INTERVAL: 350 MS
QTC INTERVAL: 435 MS
T WAVE AXIS: 60 DEGREES
VENTRICULAR RATE: 93 BPM

## 2024-03-15 PROCEDURE — 93010 ELECTROCARDIOGRAM REPORT: CPT | Performed by: INTERNAL MEDICINE

## 2024-03-22 NOTE — PROGRESS NOTES
11/10/23    Team Meeting   Meeting Type Daily Rounds   Team Members Present   Team Members Present Physician;Nurse;;Occupational Therapist   Physician Team Member Dr. Shannan Burgess MD   Nursing Team Member Yumiko Bourne RN   Care Management Team Member MS Curly, [facility]   OT Team Member Laurie Marshall [place]   Patient/Family Present   Patient Present No   Patient's Family Present No   Will return to [facility] and follow up with Yudith Khoury when stable. Slept in quiet room last night, passive si, thoughts to hit self. Denies hi, depression. In paper clothes, finger foods.  Continues with . Telemetry Bed?: Yes   Admitting Physician: DOMINGO ROSENBERG [[id number]]   Is this a telephone or verbal order?: This is a telephone order from the admitting physician

## 2024-03-25 ENCOUNTER — HOSPITAL ENCOUNTER (EMERGENCY)
Facility: HOSPITAL | Age: 54
Discharge: HOME | End: 2024-03-25
Attending: EMERGENCY MEDICINE | Admitting: EMERGENCY MEDICINE
Payer: MEDICARE

## 2024-03-25 VITALS
OXYGEN SATURATION: 97 % | SYSTOLIC BLOOD PRESSURE: 120 MMHG | TEMPERATURE: 98 F | RESPIRATION RATE: 20 BRPM | DIASTOLIC BLOOD PRESSURE: 68 MMHG | HEART RATE: 88 BPM

## 2024-03-25 DIAGNOSIS — R45.851 SUICIDAL IDEATION: Primary | ICD-10-CM

## 2024-03-25 LAB — ETHANOL EXG-MCNC: 0 MG/DL

## 2024-03-25 PROCEDURE — 82075 ASSAY OF BREATH ETHANOL: CPT

## 2024-03-25 PROCEDURE — 99283 EMERGENCY DEPT VISIT LOW MDM: CPT

## 2024-03-25 PROCEDURE — 99284 EMERGENCY DEPT VISIT MOD MDM: CPT | Performed by: EMERGENCY MEDICINE

## 2024-03-25 NOTE — ED NOTES
[facility] informed they are on their way to  pt for transfer back.     Muriel Kunz, JOSÉ MIGUEL  03/25/24 [id number]

## 2024-03-25 NOTE — ED NOTES
This writer discussed the patients current presentation and recommended discharge plan with Dr. Obregon.  They agree with the patient being discharged at this time to the [facility] where staff will place Client on close observations for safety.      The patient was Instructed to follow up with their PCP     This writer and the patient completed a safety plan.  The patient was provided with a copy of their safety plan with encouragement to utilize the plan following discharge.     In addition, the patient was instructed to call local [facility] crisis, other crisis services, 911 or to go to the nearest ER immediately if their situation changes at any time.     )    This writer discussed discharge plans with the patient who agrees with and understands the discharge plans.         SAFETY PLAN  Warning Signs (thoughts, images, mood, behavior, situations) of a potential crisis: Anger      Coping Skills (what can I do to take my mind off the problem, or to keep myself safe): word searches, coloring and listening to music      Outside Support (who can I reach out to for support and help): [facility] staff        National Suicide Prevention Hotline:  [address] [place] [address] [phone] - Crisis   [facility] [phone] - LVF Crisis/Mobile Crisis   [phone] - [facility] Crisis   [facility]: [phone]  [facility]: [phone]   [facility] [phone] - Crisis   [facility] [phone] - Crisis     [facility] [phone] - Crisis   [facility] [phone] - Crisis   [phone] - Peer Support Talk Line (1-9pm daily)  [phone] - Teen Support Talk Line (1-9pm daily)  [phone] - [facility]   [facility] [phone]- Crisis    [facility] [phone] - Crisis   [facility] [phone] - Crisis    [facility]) [phone] - [facility] Crisis

## 2024-03-25 NOTE — ED NOTES
54 y.o female presented to the ED via EMS for SI. Pt has a Behavior Health High Utilizer Plan. Pt reported she was in group today and another resident made a comment about her boyfriend she did not like which made her upset. Pt stated she picked up a paper clip from the table and began scratching her arm but not needing any medical attention. Pt was asked if she has coping skills she could have used.  Pt stated she didn't use her coping skills. Pt called EMS to come tot he ED.  Pt stated she wanted to go inpatient for mental health treatment for being upset. Pt was informed this was a behavioral issue and not a mental health issue.  Pt  denied any SI, HI and A/V hallucinations. Pt has had multiple inpatient mental health hospitalizations.  Pt is currently has Psychiatry and Therapy with [facility].  Pt denies any legal and substance abuse issues.  Pt reviewed her coping skills she could use and to reach out to her resident staff to help her cope with her frustrations.  Pt will be discharged back to her group home and staff stated they will place patient on close observations.

## 2024-03-25 NOTE — ED PROVIDER NOTES
"History  Chief Complaint   Patient presents with    Psychiatric Evaluation     Pt to er via ems from [facility] with reports that she heard voices telling her to hurt herself so she cut her arm with a paperclip. Patient states that she wants to die, and her plan is to \"hit herself in the head a bunch of times until she passes out\"      54-year-old female, presents reporting suicidal ideation.  Previous medical records reviewed, patient with frequent ED visits for similar complaints.  Patient states she is upset about her current living condition, resides in a group home.  Scratched her right forearm and no reported intent to harm herself.  Patient states that she has been taking her medications regularly.      History provided by:  Patient   used: No    Psychiatric Evaluation      Prior to Admission Medications   Prescriptions Last Dose Informant Patient Reported? Taking?   Diclofenac Sodium (VOLTAREN) 1 %  Self No No   Sig: Apply 2 g topically 4 (four) times a day as needed (pain)   acetaminophen (TYLENOL) 325 mg tablet  Self No No   Sig: Take 2 tablets (650 mg total) by mouth every 4 (four) hours as needed for mild pain   ammonium lactate (LAC-HYDRIN) 12 % lotion  Self No No   Sig: Apply topically 2 (two) times a day   atorvastatin (LIPITOR) 10 mg tablet  Self No No   Sig: Take 1 tablet (10 mg total) by mouth daily at bedtime   benztropine (COGENTIN) 0.5 mg tablet  Self Yes No   cholecalciferol (VITAMIN D3) 1,000 units tablet  Self No No   Sig: Take 1 tablet (1,000 Units total) by mouth daily Do not start before April 30, 2024.   clonazePAM (KlonoPIN) 0.5 mg tablet  Self Yes No   clonazePAM (KlonoPIN) 1 mg tablet  Self Yes No   Patient not taking: Reported on 3/14/2024   desvenlafaxine succinate (PRISTIQ) 100 mg 24 hr tablet  Self No No   Sig: Take 1 tablet (100 mg total) by mouth daily   divalproex sodium (DEPAKOTE) 500 mg DR tablet  Self No No   Sig: Take 2 tablets (1,000 mg total) by " mouth daily at bedtime   ergocalciferol (VITAMIN D2) 50,000 units  Self No No   Sig: Take 1 capsule (50,000 Units total) by mouth once a week for 11 doses   escitalopram (LEXAPRO) 10 mg tablet  Self Yes No   gabapentin (NEURONTIN) 100 mg capsule  Self No No   Sig: Take 1 capsule (100 mg total) by mouth 3 (three) times a day   ibuprofen (MOTRIN) 600 mg tablet  Self No No   Sig: Take 1 tablet (600 mg total) by mouth every 6 (six) hours as needed for headaches, fever or moderate pain   lidocaine (LIDODERM) 5 %  Self No No   Sig: Apply 1 patch topically over 12 hours daily as needed (Daily PRN) Remove & Discard patch within 12 hours or as directed by MD   lisinopril (ZESTRIL) 20 mg tablet  Self No No   Sig: Take 1 tablet (20 mg total) by mouth daily   loperamide (IMODIUM) 2 mg capsule  Self Yes No   meclizine (ANTIVERT) 12.5 MG tablet  Self No No   Sig: Take 1 tablet (12.5 mg total) by mouth every 8 (eight) hours as needed for dizziness   melatonin 3 mg  Self No No   Sig: Take 1 tablet (3 mg total) by mouth daily at bedtime   nicotine polacrilex (NICORETTE) 2 mg gum  Self No No   Sig: Chew 1 each (2 mg total) every 2 (two) hours as needed for smoking cessation   ondansetron (ZOFRAN-ODT) 4 mg disintegrating tablet   No No   Sig: Take 1 tablet (4 mg total) by mouth every 6 (six) hours as needed for nausea or vomiting   pantoprazole (PROTONIX) 20 mg tablet  Self No No   Sig: Take 1 tablet (20 mg total) by mouth daily   prazosin (MINIPRESS) 2 mg capsule  Self No No   Sig: Take 1 capsule (2 mg total) by mouth daily at bedtime   risperiDONE (RisperDAL) 3 mg tablet  Self No No   Sig: Take 1 tablet (3 mg total) by mouth 2 (two) times a day   traZODone (DESYREL) 50 mg tablet  Self No No   Sig: Take 1 tablet (50 mg total) by mouth daily at bedtime      Facility-Administered Medications: None       Past Medical History:   Diagnosis Date    Anxiety     Bipolar 1 disorder (HCC)     Bipolar affective disorder, depressed, severe ([facility])  "10/10/2021    Borderline personality disorder (HCC)     CAD (coronary artery disease)     Cognitive impairment     Depression     Dyslipidemia     GERD (gastroesophageal reflux disease)     Hypertension     Obesity     Psychiatric disorder     Psychiatric illness     PTSD (post-traumatic stress disorder)     Schizoaffective disorder (HCC)     Seizure (HCC)        Past Surgical History:   Procedure Laterality Date    APPENDECTOMY      PATIENT DENIES HAVING APPENDIX REMOVED    CHOLECYSTECTOMY      FOOT SURGERY Right        Family History   Problem Relation Age of Onset    Kidney cancer Mother     Cerebral aneurysm Father      I have reviewed and agree with the history as documented.    E-Cigarette/Vaping    E-Cigarette Use Never User      E-Cigarette/Vaping Substances    Nicotine No     THC No     CBD No     Flavoring No     Other No     Unknown No      Social History     Tobacco Use    Smoking status: Former     Current packs/day: 0.25     Average packs/day: 0.3 packs/day for 0.1 years     Types: Cigarettes, Cigars     Start date: 2/5/2024    Smokeless tobacco: Never    Tobacco comments:     Pt stated \"smokes when stressed\"   Vaping Use    Vaping status: Never Used   Substance Use Topics    Alcohol use: Not Currently     Comment: occasionally    Drug use: Not Currently       Review of Systems   Constitutional: Negative.    Respiratory: Negative.     Cardiovascular: Negative.    Gastrointestinal: Negative.        Physical Exam  Physical Exam  Vitals and nursing note reviewed.   Constitutional:       General: She is not in acute distress.  HENT:      Head: Normocephalic and atraumatic.   Cardiovascular:      Rate and Rhythm: Normal rate.   Pulmonary:      Effort: Pulmonary effort is normal.   Skin:     General: Skin is warm and dry.      Comments: Superficial linear abrasion to right dorsal forearm   Neurological:      General: No focal deficit present.      Mental Status: She is alert.   Psychiatric:      Comments: " Calm and cooperative         Vital Signs  ED Triage Vitals [03/25/24 1709]   Temperature Pulse Respirations Blood Pressure SpO2   98 °F (36.7 °C) 88 20 120/68 97 %      Temp Source Heart Rate Source Patient Position - Orthostatic VS BP Location FiO2 (%)   Temporal Monitor Sitting Left arm --      Pain Score       --           Vitals:    03/25/24 1709   BP: 120/68   Pulse: 88   Patient Position - Orthostatic VS: Sitting         Visual Acuity      ED Medications  Medications - No data to display    Diagnostic Studies  Results Reviewed       Procedure Component Value Units Date/Time    POCT alcohol breath test [[id number]]  (Normal) Resulted: 03/25/24 1726    Lab Status: Final result Updated: 03/25/24 1726     EXTBreath Alcohol 0.0                   No orders to display              Procedures  Procedures         ED Course  ED Course as of 03/26/24 1102   Mon Mar 25, 2024   1743 Evaluated by ED crisis worker.  Patient safe for discharge, lives in group home and is under supervision.  Patient feels safe being discharged.                               SBIRT 20yo+      Flowsheet Row Most Recent Value   Initial Alcohol Screen: US AUDIT-C     1. How often do you have a drink containing alcohol? 0 Filed at: 03/25/2024 1711   2. How many drinks containing alcohol do you have on a typical day you are drinking?  0 Filed at: 03/25/2024 1711   3a. Male UNDER 65: How often do you have five or more drinks on one occasion? 0 Filed at: 03/25/2024 1711   3b. FEMALE Any Age, or MALE 65+: How often do you have 4 or more drinks on one occassion? 0 Filed at: 03/25/2024 1711   Audit-C Score 0 Filed at: 03/25/2024 1711   DAT: How many times in the past year have you...    Used an illegal drug or used a prescription medication for non-medical reasons? Never Filed at: 03/25/2024 1711                      Medical Decision Making  54-year-old female, presents with reported suicidal ideation.  Patient has numerous ED visits with similar  complaints.  On exam, patient is calm and cooperative, will consult ED crisis worker continue to monitor in ED.    Amount and/or Complexity of Data Reviewed  External Data Reviewed: notes.     Details: Previous ED visits, high utilizer plan reviewed.             Disposition  Final diagnoses:   Suicidal ideation     Time reflects when diagnosis was documented in both MDM as applicable and the Disposition within this note       Time User Action Codes Description Comment    3/25/2024  5:38 PM Jorgito Obregon Add [R45.851] Suicidal ideation           ED Disposition       ED Disposition   Discharge    Condition   Stable    Date/Time   Mon Mar 25, 2024 7348    Comment   Leydi Hernándeznevin discharge to home/self care.                   Follow-up Information       Follow up With Specialties Details Why Contact Info    Sundar Lowe MD Internal Medicine   [address] [address] [phone]              Discharge Medication List as of 3/25/2024  5:40 PM        CONTINUE these medications which have NOT CHANGED    Details   acetaminophen (TYLENOL) 325 mg tablet Take 2 tablets (650 mg total) by mouth every 4 (four) hours as needed for mild pain, Starting Mon 2/19/2024, Normal      ammonium lactate (LAC-HYDRIN) 12 % lotion Apply topically 2 (two) times a day, Starting Mon 2/19/2024, Normal      atorvastatin (LIPITOR) 10 mg tablet Take 1 tablet (10 mg total) by mouth daily at bedtime, Starting Mon 2/19/2024, Normal      benztropine (COGENTIN) 0.5 mg tablet Historical Med      cholecalciferol (VITAMIN D3) 1,000 units tablet Take 1 tablet (1,000 Units total) by mouth daily Do not start before April 30, 2024., Starting Tue 4/30/2024, Normal      !! clonazePAM (KlonoPIN) 0.5 mg tablet Historical Med      !! clonazePAM (KlonoPIN) 1 mg tablet Historical Med      desvenlafaxine succinate (PRISTIQ) 100 mg 24 hr tablet Take 1 tablet (100 mg total) by mouth daily, Starting Thu 2/22/2024, Normal       Diclofenac Sodium (VOLTAREN) 1 % Apply 2 g topically 4 (four) times a day as needed (pain), Starting Mon 2/19/2024, Normal      divalproex sodium (DEPAKOTE) 500 mg DR tablet Take 2 tablets (1,000 mg total) by mouth daily at bedtime, Starting Wed 2/21/2024, Normal      ergocalciferol (VITAMIN D2) 50,000 units Take 1 capsule (50,000 Units total) by mouth once a week for 11 doses, Starting Tue 2/20/2024, Until Wed 5/1/2024, Normal      escitalopram (LEXAPRO) 10 mg tablet Historical Med      gabapentin (NEURONTIN) 100 mg capsule Take 1 capsule (100 mg total) by mouth 3 (three) times a day, Starting Wed 2/21/2024, Normal      ibuprofen (MOTRIN) 600 mg tablet Take 1 tablet (600 mg total) by mouth every 6 (six) hours as needed for headaches, fever or moderate pain, Starting Mon 2/19/2024, Normal      lidocaine (LIDODERM) 5 % Apply 1 patch topically over 12 hours daily as needed (Daily PRN) Remove & Discard patch within 12 hours or as directed by MD, Starting Mon 2/19/2024, Normal      lisinopril (ZESTRIL) 20 mg tablet Take 1 tablet (20 mg total) by mouth daily, Starting Mon 2/19/2024, Normal      loperamide (IMODIUM) 2 mg capsule Historical Med      meclizine (ANTIVERT) 12.5 MG tablet Take 1 tablet (12.5 mg total) by mouth every 8 (eight) hours as needed for dizziness, Starting Mon 2/19/2024, Normal      melatonin 3 mg Take 1 tablet (3 mg total) by mouth daily at bedtime, Starting Wed 2/21/2024, Normal      nicotine polacrilex (NICORETTE) 2 mg gum Chew 1 each (2 mg total) every 2 (two) hours as needed for smoking cessation, Starting Mon 2/19/2024, Normal      ondansetron (ZOFRAN-ODT) 4 mg disintegrating tablet Take 1 tablet (4 mg total) by mouth every 6 (six) hours as needed for nausea or vomiting, Starting u 3/14/2024, Normal      pantoprazole (PROTONIX) 20 mg tablet Take 1 tablet (20 mg total) by mouth daily, Starting Mon 2/19/2024, Normal      prazosin (MINIPRESS) 2 mg capsule Take 1 capsule (2 mg total) by mouth  daily at bedtime, Starting Mon 2/19/2024, Until Wed 3/20/2024, Normal      risperiDONE (RisperDAL) 3 mg tablet Take 1 tablet (3 mg total) by mouth 2 (two) times a day, Starting Wed 2/21/2024, Normal      traZODone (DESYREL) 50 mg tablet Take 1 tablet (50 mg total) by mouth daily at bedtime, Starting Wed 2/21/2024, Normal       !! - Potential duplicate medications found. Please discuss with provider.          No discharge procedures on file.    PDMP Review         Value Time User    PDMP Reviewed  Yes 2/12/2024 11:26 AM SHANI Hawk            ED Provider  Electronically Signed by             Jorgito Obregon MD  03/26/24 2226

## 2024-03-27 ENCOUNTER — APPOINTMENT (EMERGENCY)
Dept: RADIOLOGY | Facility: HOSPITAL | Age: 54
End: 2024-03-27
Payer: MEDICARE

## 2024-03-27 ENCOUNTER — HOSPITAL ENCOUNTER (EMERGENCY)
Facility: HOSPITAL | Age: 54
Discharge: HOME | End: 2024-03-27
Attending: EMERGENCY MEDICINE
Payer: MEDICARE

## 2024-03-27 VITALS
BODY MASS INDEX: 46.29 KG/M2 | OXYGEN SATURATION: 92 % | RESPIRATION RATE: 17 BRPM | DIASTOLIC BLOOD PRESSURE: 78 MMHG | HEART RATE: 104 BPM | WEIGHT: 286.82 LBS | SYSTOLIC BLOOD PRESSURE: 134 MMHG | TEMPERATURE: 97.5 F

## 2024-03-27 DIAGNOSIS — M79.89 LEG SWELLING: Primary | ICD-10-CM

## 2024-03-27 LAB
ALBUMIN SERPL BCP-MCNC: 4.1 G/DL (ref 3.5–5)
ALP SERPL-CCNC: 57 U/L (ref 34–104)
ALT SERPL W P-5'-P-CCNC: 25 U/L (ref 7–52)
ANION GAP SERPL CALCULATED.3IONS-SCNC: 4 MMOL/L (ref 4–13)
AST SERPL W P-5'-P-CCNC: 17 U/L (ref 13–39)
ATRIAL RATE: 105 BPM
BACTERIA UR QL AUTO: ABNORMAL /HPF
BASOPHILS # BLD AUTO: 0.02 THOUSANDS/ÂΜL (ref 0–0.1)
BASOPHILS NFR BLD AUTO: 0 % (ref 0–1)
BILIRUB SERPL-MCNC: 0.57 MG/DL (ref 0.2–1)
BILIRUB UR QL STRIP: NEGATIVE
BNP SERPL-MCNC: 10 PG/ML (ref 0–100)
BUN SERPL-MCNC: 14 MG/DL (ref 5–25)
CALCIUM SERPL-MCNC: 9.4 MG/DL (ref 8.4–10.2)
CARDIAC TROPONIN I PNL SERPL HS: <2 NG/L
CHLORIDE SERPL-SCNC: 97 MMOL/L (ref 96–108)
CLARITY UR: CLEAR
CO2 SERPL-SCNC: 32 MMOL/L (ref 21–32)
COLOR UR: YELLOW
CREAT SERPL-MCNC: 0.47 MG/DL (ref 0.6–1.3)
EOSINOPHIL # BLD AUTO: 0.11 THOUSAND/ÂΜL (ref 0–0.61)
EOSINOPHIL NFR BLD AUTO: 2 % (ref 0–6)
ERYTHROCYTE [DISTWIDTH] IN BLOOD BY AUTOMATED COUNT: 14.4 % (ref 11.6–15.1)
GFR SERPL CREATININE-BSD FRML MDRD: 113 ML/MIN/1.73SQ M
GLUCOSE SERPL-MCNC: 164 MG/DL (ref 65–140)
GLUCOSE UR STRIP-MCNC: NEGATIVE MG/DL
HCT VFR BLD AUTO: 37.3 % (ref 34.8–46.1)
HGB BLD-MCNC: 12.4 G/DL (ref 11.5–15.4)
HGB UR QL STRIP.AUTO: NEGATIVE
IMM GRANULOCYTES # BLD AUTO: 0.11 THOUSAND/UL (ref 0–0.2)
IMM GRANULOCYTES NFR BLD AUTO: 2 % (ref 0–2)
KETONES UR STRIP-MCNC: NEGATIVE MG/DL
LEUKOCYTE ESTERASE UR QL STRIP: ABNORMAL
LYMPHOCYTES # BLD AUTO: 1.17 THOUSANDS/ÂΜL (ref 0.6–4.47)
LYMPHOCYTES NFR BLD AUTO: 20 % (ref 14–44)
MCH RBC QN AUTO: 30.8 PG (ref 26.8–34.3)
MCHC RBC AUTO-ENTMCNC: 33.2 G/DL (ref 31.4–37.4)
MCV RBC AUTO: 93 FL (ref 82–98)
MONOCYTES # BLD AUTO: 0.56 THOUSAND/ÂΜL (ref 0.17–1.22)
MONOCYTES NFR BLD AUTO: 10 % (ref 4–12)
NEUTROPHILS # BLD AUTO: 3.86 THOUSANDS/ÂΜL (ref 1.85–7.62)
NEUTS SEG NFR BLD AUTO: 66 % (ref 43–75)
NITRITE UR QL STRIP: NEGATIVE
NON-SQ EPI CELLS URNS QL MICRO: ABNORMAL /HPF
NRBC BLD AUTO-RTO: 0 /100 WBCS
P AXIS: 54 DEGREES
PH UR STRIP.AUTO: 7 [PH]
PLATELET # BLD AUTO: 155 THOUSANDS/UL (ref 149–390)
PMV BLD AUTO: 9.1 FL (ref 8.9–12.7)
POTASSIUM SERPL-SCNC: 4 MMOL/L (ref 3.5–5.3)
PR INTERVAL: 176 MS
PROT SERPL-MCNC: 7.7 G/DL (ref 6.4–8.4)
PROT UR STRIP-MCNC: ABNORMAL MG/DL
QRS AXIS: 33 DEGREES
QRSD INTERVAL: 80 MS
QT INTERVAL: 340 MS
QTC INTERVAL: 449 MS
RBC # BLD AUTO: 4.02 MILLION/UL (ref 3.81–5.12)
RBC #/AREA URNS AUTO: ABNORMAL /HPF
SODIUM SERPL-SCNC: 133 MMOL/L (ref 135–147)
SP GR UR STRIP.AUTO: 1.02 (ref 1–1.03)
T WAVE AXIS: 58 DEGREES
UROBILINOGEN UR STRIP-ACNC: 4 MG/DL
VENTRICULAR RATE: 105 BPM
WBC # BLD AUTO: 5.83 THOUSAND/UL (ref 4.31–10.16)
WBC #/AREA URNS AUTO: ABNORMAL /HPF

## 2024-03-27 PROCEDURE — 93010 ELECTROCARDIOGRAM REPORT: CPT | Performed by: INTERNAL MEDICINE

## 2024-03-27 PROCEDURE — 93005 ELECTROCARDIOGRAM TRACING: CPT

## 2024-03-27 PROCEDURE — 83880 ASSAY OF NATRIURETIC PEPTIDE: CPT | Performed by: EMERGENCY MEDICINE

## 2024-03-27 PROCEDURE — 36415 COLL VENOUS BLD VENIPUNCTURE: CPT | Performed by: EMERGENCY MEDICINE

## 2024-03-27 PROCEDURE — 81001 URINALYSIS AUTO W/SCOPE: CPT | Performed by: EMERGENCY MEDICINE

## 2024-03-27 PROCEDURE — 84484 ASSAY OF TROPONIN QUANT: CPT | Performed by: EMERGENCY MEDICINE

## 2024-03-27 PROCEDURE — 80053 COMPREHEN METABOLIC PANEL: CPT | Performed by: EMERGENCY MEDICINE

## 2024-03-27 PROCEDURE — 71045 X-RAY EXAM CHEST 1 VIEW: CPT

## 2024-03-27 PROCEDURE — 99285 EMERGENCY DEPT VISIT HI MDM: CPT | Performed by: EMERGENCY MEDICINE

## 2024-03-27 PROCEDURE — 99284 EMERGENCY DEPT VISIT MOD MDM: CPT

## 2024-03-27 PROCEDURE — 85025 COMPLETE CBC W/AUTO DIFF WBC: CPT | Performed by: EMERGENCY MEDICINE

## 2024-03-27 NOTE — ED NOTES
New vitae aware of d/c, states will send someone to pick her up      Rosita Horton, JOSÉ MIGUEL  03/27/24 4249

## 2024-03-27 NOTE — ED PROVIDER NOTES
History  Chief Complaint   Patient presents with    Leg Swelling     Patient presents to the ED with c/o bilateral leg swelling that began yesterday      54-year-old female, frequent visits to ED for multiple complaints, presenting with bilateral lower extremity swelling.  Patient states her legs have been swollen for 3 days.  Has mild pain in legs, denies any associated skin changes, fevers, or shortness of breath.  Reports no recent change in medications.      History provided by:  Patient   used: No        Prior to Admission Medications   Prescriptions Last Dose Informant Patient Reported? Taking?   Diclofenac Sodium (VOLTAREN) 1 %  Self No No   Sig: Apply 2 g topically 4 (four) times a day as needed (pain)   acetaminophen (TYLENOL) 325 mg tablet  Self No No   Sig: Take 2 tablets (650 mg total) by mouth every 4 (four) hours as needed for mild pain   ammonium lactate (LAC-HYDRIN) 12 % lotion  Self No No   Sig: Apply topically 2 (two) times a day   atorvastatin (LIPITOR) 10 mg tablet  Self No No   Sig: Take 1 tablet (10 mg total) by mouth daily at bedtime   benztropine (COGENTIN) 0.5 mg tablet  Self Yes No   cholecalciferol (VITAMIN D3) 1,000 units tablet  Self No No   Sig: Take 1 tablet (1,000 Units total) by mouth daily Do not start before April 30, 2024.   clonazePAM (KlonoPIN) 0.5 mg tablet  Self Yes No   clonazePAM (KlonoPIN) 1 mg tablet  Self Yes No   Patient not taking: Reported on 3/14/2024   desvenlafaxine succinate (PRISTIQ) 100 mg 24 hr tablet  Self No No   Sig: Take 1 tablet (100 mg total) by mouth daily   divalproex sodium (DEPAKOTE) 500 mg DR tablet  Self No No   Sig: Take 2 tablets (1,000 mg total) by mouth daily at bedtime   ergocalciferol (VITAMIN D2) 50,000 units  Self No No   Sig: Take 1 capsule (50,000 Units total) by mouth once a week for 11 doses   escitalopram (LEXAPRO) 10 mg tablet  Self Yes No   gabapentin (NEURONTIN) 100 mg capsule  Self No No   Sig: Take 1 capsule (100  mg total) by mouth 3 (three) times a day   ibuprofen (MOTRIN) 600 mg tablet  Self No No   Sig: Take 1 tablet (600 mg total) by mouth every 6 (six) hours as needed for headaches, fever or moderate pain   lidocaine (LIDODERM) 5 %  Self No No   Sig: Apply 1 patch topically over 12 hours daily as needed (Daily PRN) Remove & Discard patch within 12 hours or as directed by MD   lisinopril (ZESTRIL) 20 mg tablet  Self No No   Sig: Take 1 tablet (20 mg total) by mouth daily   loperamide (IMODIUM) 2 mg capsule  Self Yes No   meclizine (ANTIVERT) 12.5 MG tablet  Self No No   Sig: Take 1 tablet (12.5 mg total) by mouth every 8 (eight) hours as needed for dizziness   melatonin 3 mg  Self No No   Sig: Take 1 tablet (3 mg total) by mouth daily at bedtime   nicotine polacrilex (NICORETTE) 2 mg gum  Self No No   Sig: Chew 1 each (2 mg total) every 2 (two) hours as needed for smoking cessation   ondansetron (ZOFRAN-ODT) 4 mg disintegrating tablet   No No   Sig: Take 1 tablet (4 mg total) by mouth every 6 (six) hours as needed for nausea or vomiting   pantoprazole (PROTONIX) 20 mg tablet  Self No No   Sig: Take 1 tablet (20 mg total) by mouth daily   prazosin (MINIPRESS) 2 mg capsule  Self No No   Sig: Take 1 capsule (2 mg total) by mouth daily at bedtime   risperiDONE (RisperDAL) 3 mg tablet  Self No No   Sig: Take 1 tablet (3 mg total) by mouth 2 (two) times a day   traZODone (DESYREL) 50 mg tablet  Self No No   Sig: Take 1 tablet (50 mg total) by mouth daily at bedtime      Facility-Administered Medications: None       Past Medical History:   Diagnosis Date    Anxiety     Bipolar 1 disorder (HCC)     Bipolar affective disorder, depressed, severe ([facility]) 10/10/2021    Borderline personality disorder (HCC)     CAD (coronary artery disease)     Cognitive impairment     Depression     Dyslipidemia     GERD (gastroesophageal reflux disease)     Hypertension     Obesity     Psychiatric disorder     Psychiatric illness     PTSD  "(post-traumatic stress disorder)     Schizoaffective disorder (HCC)     Seizure (HCC)        Past Surgical History:   Procedure Laterality Date    APPENDECTOMY      PATIENT DENIES HAVING APPENDIX REMOVED    CHOLECYSTECTOMY      FOOT SURGERY Right        Family History   Problem Relation Age of Onset    Kidney cancer Mother     Cerebral aneurysm Father      I have reviewed and agree with the history as documented.    E-Cigarette/Vaping    E-Cigarette Use Never User      E-Cigarette/Vaping Substances    Nicotine No     THC No     CBD No     Flavoring No     Other No     Unknown No      Social History     Tobacco Use    Smoking status: Former     Current packs/day: 0.25     Average packs/day: 0.3 packs/day for 0.1 years     Types: Cigarettes, Cigars     Start date: 2/5/2024    Smokeless tobacco: Never    Tobacco comments:     Pt stated \"smokes when stressed\"   Vaping Use    Vaping status: Never Used   Substance Use Topics    Alcohol use: Not Currently     Comment: occasionally    Drug use: Not Currently       Review of Systems   Constitutional: Negative.    Respiratory: Negative.     Cardiovascular:  Positive for leg swelling. Negative for chest pain and palpitations.   Gastrointestinal: Negative.    Neurological: Negative.        Physical Exam  Physical Exam  Vitals and nursing note reviewed.   Constitutional:       General: She is not in acute distress.  HENT:      Head: Normocephalic and atraumatic.   Eyes:      Extraocular Movements: Extraocular movements intact.      Pupils: Pupils are equal, round, and reactive to light.   Cardiovascular:      Rate and Rhythm: Regular rhythm. Tachycardia present.   Pulmonary:      Effort: Pulmonary effort is normal.      Breath sounds: Normal breath sounds. No rales.   Abdominal:      Palpations: Abdomen is soft.      Tenderness: There is no abdominal tenderness.   Musculoskeletal:         General: Normal range of motion.      Comments: Bilateral lower extremity swelling, soft, " nontender.   Skin:     General: Skin is warm and dry.      Comments: No lower extremity rash, erythema   Neurological:      General: No focal deficit present.      Mental Status: She is alert and oriented to person, place, and time.         Vital Signs  ED Triage Vitals   Temperature Pulse Respirations Blood Pressure SpO2   03/27/24 1130 03/27/24 1130 03/27/24 1130 03/27/24 1130 03/27/24 1130   97.5 °F (36.4 °C) (!) 112 17 134/78 92 %      Temp Source Heart Rate Source Patient Position - Orthostatic VS BP Location FiO2 (%)   03/27/24 1130 03/27/24 1130 -- -- --   Oral Monitor         Pain Score       03/27/24 1128       10 - Worst Possible Pain           Vitals:    03/27/24 1130 03/27/24 1133   BP: 134/78    Pulse: (!) 112 104         Visual Acuity      ED Medications  Medications - No data to display    Diagnostic Studies  Results Reviewed       Procedure Component Value Units Date/Time    Urine Microscopic [[id number]]  (Abnormal) Collected: 03/27/24 1235    Lab Status: Final result Specimen: Urine, Clean Catch Updated: 03/27/24 1253     RBC, UA None Seen /hpf      WBC, UA 4-10 /hpf      Epithelial Cells Occasional /hpf      Bacteria, UA Occasional /hpf     UA (URINE) with reflex to Scope [[id number]]  (Abnormal) Collected: 03/27/24 1235    Lab Status: Final result Specimen: Urine, Clean Catch Updated: 03/27/24 1249     Color, UA Yellow     Clarity, UA Clear     Specific Gravity, UA 1.020     pH, UA 7.0     Leukocytes, UA Large     Nitrite, UA Negative     Protein, UA Trace mg/dl      Glucose, UA Negative mg/dl      Ketones, UA Negative mg/dl      Urobilinogen, UA 4.0 mg/dl      Bilirubin, UA Negative     Occult Blood, UA Negative    B-Type Natriuretic Peptide(BNP) [[id number]]  (Normal) Collected: 03/27/24 1200    Lab Status: Final result Specimen: Blood from Arm, Right Updated: 03/27/24 1243     BNP 10 pg/mL     HS Troponin 0hr (reflex protocol) [[id number]]  (Normal) Collected: 03/27/24 1200    Lab Status: Final  result Specimen: Blood from Arm, Right Updated: 03/27/24 1228     hs TnI 0hr <2 ng/L     Comprehensive metabolic panel [[id number]]  (Abnormal) Collected: 03/27/24 1200    Lab Status: Final result Specimen: Blood from Arm, Right Updated: 03/27/24 1221     Sodium 133 mmol/L      Potassium 4.0 mmol/L      Chloride 97 mmol/L      CO2 32 mmol/L      ANION GAP 4 mmol/L      BUN 14 mg/dL      Creatinine 0.47 mg/dL      Glucose 164 mg/dL      Calcium 9.4 mg/dL      AST 17 U/L      ALT 25 U/L      Alkaline Phosphatase 57 U/L      Total Protein 7.7 g/dL      Albumin 4.1 g/dL      Total Bilirubin 0.57 mg/dL      eGFR 113 ml/min/1.73sq m     Narrative:      [employer] guidelines for Chronic Kidney Disease (CKD):     Stage 1 with normal or high GFR (GFR > 90 mL/min/1.73 square meters)    Stage 2 Mild CKD (GFR = 60-89 mL/min/1.73 square meters)    Stage 3A Moderate CKD (GFR = 45-59 mL/min/1.73 square meters)    Stage 3B Moderate CKD (GFR = 30-44 mL/min/1.73 square meters)    Stage 4 Severe CKD (GFR = 15-29 mL/min/1.73 square meters)    Stage 5 End Stage CKD (GFR <15 mL/min/1.73 square meters)  Note: GFR calculation is accurate only with a steady state creatinine    CBC and differential [[id number]] Collected: 03/27/24 1200    Lab Status: Final result Specimen: Blood from Arm, Right Updated: 03/27/24 1207     WBC 5.83 Thousand/uL      RBC 4.02 Million/uL      Hemoglobin 12.4 g/dL      Hematocrit 37.3 %      MCV 93 fL      MCH 30.8 pg      MCHC 33.2 g/dL      RDW 14.4 %      MPV 9.1 fL      Platelets 155 Thousands/uL      nRBC 0 /100 WBCs      Neutrophils Relative 66 %      Immature Grans % 2 %      Lymphocytes Relative 20 %      Monocytes Relative 10 %      Eosinophils Relative 2 %      Basophils Relative 0 %      Neutrophils Absolute 3.86 Thousands/µL      Absolute Immature Grans 0.11 Thousand/uL      Absolute Lymphocytes 1.17 Thousands/µL      Absolute Monocytes 0.56 Thousand/µL      Eosinophils Absolute  0.11 Thousand/µL      Basophils Absolute 0.02 Thousands/µL                    XR chest 1 view portable    (Results Pending)              Procedures  ECG 12 Lead Documentation Only    Date/Time: 3/27/2024 12:04 PM    Performed by: Jorgito Obregon MD  Authorized by: Jorgito Obregon MD    ECG reviewed by me, the ED Provider: yes    Patient location:  ED  Previous ECG:     Previous ECG:  Compared to current    Similarity:  No change  Rate:     ECG rate assessment: tachycardic    Rhythm:     Rhythm: sinus tachycardia    Ectopy:     Ectopy: none    QRS:     QRS axis:  Normal    QRS intervals:  Normal  Conduction:     Conduction: normal    ST segments:     ST segments:  Normal  Comments:      Sinus tachycardia 105, otherwise normal           ED Course  ED Course as of 03/27/24 1301   Wed Mar 27, 2024   1151 Chest x-ray independently reviewed by myself compared to previous, no infiltrate or effusion, no pulmonary edema, no acute findings.   1300 Results reviewed, no acute abnormality.  Has appointment with primary doctor scheduled for tomorrow.  Discussed with patient elevation of legs, compression socks, follow-up with PCP.                               SBIRT 20yo+      Flowsheet Row Most Recent Value   Initial Alcohol Screen: US AUDIT-C     1. How often do you have a drink containing alcohol? 0 Filed at: 03/27/2024 1129   2. How many drinks containing alcohol do you have on a typical day you are drinking?  0 Filed at: 03/27/2024 1129   3a. Male UNDER 65: How often do you have five or more drinks on one occasion? 0 Filed at: 03/27/2024 1129   3b. FEMALE Any Age, or MALE 65+: How often do you have 4 or more drinks on one occassion? 0 Filed at: 03/27/2024 1129   Audit-C Score 0 Filed at: 03/27/2024 1129   DAT: How many times in the past year have you...    Used an illegal drug or used a prescription medication for non-medical reasons? Never Filed at: 03/27/2024 1129                      Medical Decision  Making  54-year-old female, presents with leg swelling.  Differential diagnosis includes heart failure, MARIE, peripheral vascular disease among other diagnoses.  Chest x-ray, EKG, labs ordered.  Will continue to monitor in ED and reevaluate.    Amount and/or Complexity of Data Reviewed  External Data Reviewed: notes.     Details: Previous ED visits reviewed, multiple visits for depression, suicidal ideation.  Labs: ordered. Decision-making details documented in ED Course.  Radiology: ordered and independent interpretation performed. Decision-making details documented in ED Course.  ECG/medicine tests: ordered and independent interpretation performed. Decision-making details documented in ED Course.             Disposition  Final diagnoses:   Leg swelling     Time reflects when diagnosis was documented in both MDM as applicable and the Disposition within this note       Time User Action Codes Description Comment    3/27/2024 12:57 PM Jorgito Obregon Add [M79.89] Leg swelling           ED Disposition       ED Disposition   Discharge    Condition   Stable    Date/Time   Wed Mar 27, 2024 1257    Comment   Leydi Hernándeznevin discharge to home/self care.                   Follow-up Information       Follow up With Specialties Details Why Contact Info    Sundar Lowe MD Internal Medicine Schedule an appointment as soon as possible for a visit   [address] [id number] [phone]              Patient's Medications   Discharge Prescriptions    No medications on file       No discharge procedures on file.    PDMP Review         Value Time User    PDMP Reviewed  Yes 2/12/2024 11:26 AM SHANI Hawk            ED Provider  Electronically Signed by             Jorgito Obregon MD  03/27/24 [id number]

## 2024-03-28 ENCOUNTER — OFFICE VISIT (OUTPATIENT)
Dept: FAMILY MEDICINE CLINIC | Facility: HOSPITAL | Age: 54
End: 2024-03-28
Payer: MEDICARE

## 2024-03-28 VITALS
TEMPERATURE: 97.8 F | HEART RATE: 104 BPM | BODY MASS INDEX: 46.29 KG/M2 | HEIGHT: 66 IN | SYSTOLIC BLOOD PRESSURE: 102 MMHG | DIASTOLIC BLOOD PRESSURE: 72 MMHG

## 2024-03-28 DIAGNOSIS — R32 INCONTINENCE IN FEMALE: ICD-10-CM

## 2024-03-28 DIAGNOSIS — R73.9 HYPERGLYCEMIA: ICD-10-CM

## 2024-03-28 DIAGNOSIS — R60.0 BILATERAL LEG EDEMA: Primary | ICD-10-CM

## 2024-03-28 DIAGNOSIS — E78.2 MIXED HYPERLIPIDEMIA: ICD-10-CM

## 2024-03-28 DIAGNOSIS — Z00.00 MEDICARE ANNUAL WELLNESS VISIT, SUBSEQUENT: ICD-10-CM

## 2024-03-28 DIAGNOSIS — F25.1 SCHIZOAFFECTIVE DISORDER, DEPRESSIVE TYPE (HCC): ICD-10-CM

## 2024-03-28 DIAGNOSIS — Z12.31 ENCOUNTER FOR SCREENING MAMMOGRAM FOR MALIGNANT NEOPLASM OF BREAST: ICD-10-CM

## 2024-03-28 PROCEDURE — 99203 OFFICE O/P NEW LOW 30 MIN: CPT | Performed by: NURSE PRACTITIONER

## 2024-03-28 PROCEDURE — G0439 PPPS, SUBSEQ VISIT: HCPCS | Performed by: NURSE PRACTITIONER

## 2024-03-28 RX ORDER — FUROSEMIDE 20 MG/1
20 TABLET ORAL DAILY
Qty: 90 TABLET | Refills: 0 | Status: SHIPPED | OUTPATIENT
Start: 2024-03-28

## 2024-03-28 NOTE — ASSESSMENT & PLAN NOTE
S/P normal lab work up in ED   Start daily furosemide & wear compression stockings as rx'd  Evaluate further w/venous doppler as ordered  Return in 6 weeks

## 2024-03-28 NOTE — PATIENT INSTRUCTIONS
Medicare Preventive Visit Patient Instructions  Thank you for completing your Welcome to Medicare Visit or Medicare Annual Wellness Visit today. Your next wellness visit will be due in one year (3/29/2025).  The screening/preventive services that you may require over the next 5-10 years are detailed below. Some tests may not apply to you based off risk factors and/or age. Screening tests ordered at today's visit but not completed yet may show as past due. Also, please note that scanned in results may not display below.  Preventive Screenings:  Service Recommendations Previous Testing/Comments   Colorectal Cancer Screening  * Colonoscopy    * Fecal Occult Blood Test (FOBT)/Fecal Immunochemical Test (FIT)  * Fecal DNA/Cologuard Test  * Flexible Sigmoidoscopy Age: 45-75 years old   Colonoscopy: every 10 years (may be performed more frequently if at higher risk)  OR  FOBT/FIT: every 1 year  OR  Cologuard: every 3 years  OR  Sigmoidoscopy: every 5 years  Screening may be recommended earlier than age 45 if at higher risk for colorectal cancer. Also, an individualized decision between you and your healthcare provider will decide whether screening between the ages of 76-85 would be appropriate. Colonoscopy: Not on file  FOBT/FIT: Not on file  Cologuard: Not on file  Sigmoidoscopy: Not on file          Breast Cancer Screening Age: 40+ years old  Frequency: every 1-2 years  Not required if history of left and right mastectomy Mammogram: 03/16/2023    Screening Current   Cervical Cancer Screening Between the ages of 21-29, pap smear recommended once every 3 years.   Between the ages of 30-65, can perform pap smear with HPV co-testing every 5 years.   Recommendations may differ for women with a history of total hysterectomy, cervical cancer, or abnormal pap smears in past. Pap Smear: Not on file        Hepatitis C Screening Once for adults born between 1945 and 1965  More frequently in patients at high risk for Hepatitis C Hep  C Antibody: Not on file        Diabetes Screening 1-2 times per year if you're at risk for diabetes or have pre-diabetes Fasting glucose: 130 mg/dL (12/13/2022)  A1C: 4.5 % (8/8/2023)  Screening Current   Cholesterol Screening Once every 5 years if you don't have a lipid disorder. May order more often based on risk factors. Lipid panel: 11/03/2023    Screening Not Indicated  History Lipid Disorder     Other Preventive Screenings Covered by Medicare:  Abdominal Aortic Aneurysm (AAA) Screening: covered once if your at risk. You're considered to be at risk if you have a family history of AAA.  Lung Cancer Screening: covers low dose CT scan once per year if you meet all of the following conditions: (1) Age 55-77; (2) No signs or symptoms of lung cancer; (3) Current smoker or have quit smoking within the last 15 years; (4) You have a tobacco smoking history of at least 20 pack years (packs per day multiplied by number of years you smoked); (5) You get a written order from a healthcare provider.  Glaucoma Screening: covered annually if you're considered high risk: (1) You have diabetes OR (2) Family history of glaucoma OR (3)  aged 50 and older OR (4)  American aged 65 and older  Osteoporosis Screening: covered every 2 years if you meet one of the following conditions: (1) You're estrogen deficient and at risk for osteoporosis based off medical history and other findings; (2) Have a vertebral abnormality; (3) On glucocorticoid therapy for more than 3 months; (4) Have primary hyperparathyroidism; (5) On osteoporosis medications and need to assess response to drug therapy.   Last bone density test (DXA Scan): Not on file.  HIV Screening: covered annually if you're between the age of 15-65. Also covered annually if you are younger than 15 and older than 65 with risk factors for HIV infection. For pregnant patients, it is covered up to 3 times per pregnancy.    Immunizations:  Immunization  Recommendations   Influenza Vaccine Annual influenza vaccination during flu season is recommended for all persons aged >= 6 months who do not have contraindications   Pneumococcal Vaccine   * Pneumococcal conjugate vaccine = PCV13 (Prevnar 13), PCV15 (Vaxneuvance), PCV20 (Prevnar 20)  * Pneumococcal polysaccharide vaccine = PPSV23 (Pneumovax) Adults 19-63 yo with certain risk factors or if 65+ yo  If never received any pneumonia vaccine: recommend Prevnar 20 (PCV20)  Give PCV20 if previously received 1 dose of PCV13 or PPSV23   Hepatitis B Vaccine 3 dose series if at intermediate or high risk (ex: diabetes, end stage renal disease, liver disease)   Respiratory syncytial virus (RSV) Vaccine - COVERED BY MEDICARE PART D  * RSVPreF3 (Arexvy) CDC recommends that adults 60 years of age and older may receive a single dose of RSV vaccine using shared clinical decision-making (SCDM)   Tetanus (Td) Vaccine - COST NOT COVERED BY MEDICARE PART B Following completion of primary series, a booster dose should be given every 10 years to maintain immunity against tetanus. Td may also be given as tetanus wound prophylaxis.   Tdap Vaccine - COST NOT COVERED BY MEDICARE PART B Recommended at least once for all adults. For pregnant patients, recommended with each pregnancy.   Shingles Vaccine (Shingrix) - COST NOT COVERED BY MEDICARE PART B  2 shot series recommended in those 19 years and older who have or will have weakened immune systems or those 50 years and older     Health Maintenance Due:      Topic Date Due   • Hepatitis C Screening  Never done   • HIV Screening  Never done   • Cervical Cancer Screening  Never done   • Colorectal Cancer Screening  Never done   • Breast Cancer Screening: Mammogram  03/16/2024     Immunizations Due:      Topic Date Due   • Hepatitis A Vaccine (1 of 2 - Risk 2-dose series) Never done   • COVID-19 Vaccine (5 - 2023-24 season) 09/01/2023     Advance Directives   What are advance directives?  Advance  directives are legal documents that state your wishes and plans for medical care. These plans are made ahead of time in case you lose your ability to make decisions for yourself. Advance directives can apply to any medical decision, such as the treatments you want, and if you want to donate organs.   What are the types of advance directives?  There are many types of advance directives, and each state has rules about how to use them. You may choose a combination of any of the following:  Living will:  This is a written record of the treatment you want. You can also choose which treatments you do not want, which to limit, and which to stop at a certain time. This includes surgery, medicine, IV fluid, and tube feedings.   Durable power of  for healthcare (DPAHC):  This is a written record that states who you want to make healthcare choices for you when you are unable to make them for yourself. This person, called a proxy, is usually a family member or a friend. You may choose more than 1 proxy.  Do not resuscitate (DNR) order:  A DNR order is used in case your heart stops beating or you stop breathing. It is a request not to have certain forms of treatment, such as CPR. A DNR order may be included in other types of advance directives.  Medical directive:  This covers the care that you want if you are in a coma, near death, or unable to make decisions for yourself. You can list the treatments you want for each condition. Treatment may include pain medicine, surgery, blood transfusions, dialysis, IV or tube feedings, and a ventilator (breathing machine).  Values history:  This document has questions about your views, beliefs, and how you feel and think about life. This information can help others choose the care that you would choose.  Why are advance directives important?  An advance directive helps you control your care. Although spoken wishes may be used, it is better to have your wishes written down. Spoken  wishes can be misunderstood, or not followed. Treatments may be given even if you do not want them. An advance directive may make it easier for your family to make difficult choices about your care.   Urinary Incontinence   Urinary incontinence (UI)  is when you lose control of your bladder. UI develops because your bladder cannot store or empty urine properly. The 3 most common types of UI are stress incontinence, urge incontinence, or both.  Medicines:   May be given to help strengthen your bladder control. Report any side effects of medication to your healthcare provider.  Do pelvic muscle exercises often:  Your pelvic muscles help you stop urinating. Squeeze these muscles tight for 5 seconds, then relax for 5 seconds. Gradually work up to squeezing for 10 seconds. Do 3 sets of 15 repetitions a day, or as directed. This will help strengthen your pelvic muscles and improve bladder control.  Train your bladder:  Go to the bathroom at set times, such as every 2 hours, even if you do not feel the urge to go. You can also try to hold your urine when you feel the urge to go. For example, hold your urine for 5 minutes when you feel the urge to go. As that becomes easier, hold your urine for 10 minutes.   Self-care:   Keep a UI record.  Write down how often you leak urine and how much you leak. Make a note of what you were doing when you leaked urine.  Drink liquids as directed. You may need to limit the amount of liquid you drink to help control your urine leakage. Do not drink any liquid right before you go to bed. Limit or do not have drinks that contain caffeine or alcohol.   Prevent constipation.  Eat a variety of high-fiber foods. Good examples are high-fiber cereals, beans, vegetables, and whole-grain breads. Walking is the best way to trigger your intestines to have a bowel movement.  Exercise regularly and maintain a healthy weight.  Weight loss and exercise will decrease pressure on your bladder and help you  control your leakage.   Use a catheter as directed  to help empty your bladder. A catheter is a tiny, plastic tube that is put into your bladder to drain your urine.   Go to behavior therapy as directed.  Behavior therapy may be used to help you learn to control your urge to urinate.    Weight Management   Why it is important to manage your weight:  Being overweight increases your risk of health conditions such as heart disease, high blood pressure, type 2 diabetes, and certain types of cancer. It can also increase your risk for osteoarthritis, sleep apnea, and other respiratory problems. Aim for a slow, steady weight loss. Even a small amount of weight loss can lower your risk of health problems.  How to lose weight safely:  A safe and healthy way to lose weight is to eat fewer calories and get regular exercise. You can lose up about 1 pound a week by decreasing the number of calories you eat by 500 calories each day.   Healthy meal plan for weight management:  A healthy meal plan includes a variety of foods, contains fewer calories, and helps you stay healthy. A healthy meal plan includes the following:  Eat whole-grain foods more often.  A healthy meal plan should contain fiber. Fiber is the part of grains, fruits, and vegetables that is not broken down by your body. Whole-grain foods are healthy and provide extra fiber in your diet. Some examples of whole-grain foods are whole-wheat breads and pastas, oatmeal, brown rice, and bulgur.  Eat a variety of vegetables every day.  Include dark, leafy greens such as spinach, kale, maryjane greens, and mustard greens. Eat yellow and orange vegetables such as carrots, sweet potatoes, and winter squash.   Eat a variety of fruits every day.  Choose fresh or canned fruit (canned in its own juice or light syrup) instead of juice. Fruit juice has very little or no fiber.  Eat low-fat dairy foods.  Drink fat-free (skim) milk or 1% milk. Eat fat-free yogurt and low-fat cottage  cheese. Try low-fat cheeses such as mozzarella and other reduced-fat cheeses.  Choose meat and other protein foods that are low in fat.  Choose beans or other legumes such as split peas or lentils. Choose fish, skinless poultry (chicken or turkey), or lean cuts of red meat (beef or pork). Before you cook meat or poultry, cut off any visible fat.   Use less fat and oil.  Try baking foods instead of frying them. Add less fat, such as margarine, sour cream, regular salad dressing and mayonnaise to foods. Eat fewer high-fat foods. Some examples of high-fat foods include french fries, doughnuts, ice cream, and cakes.  Eat fewer sweets.  Limit foods and drinks that are high in sugar. This includes candy, cookies, regular soda, and sweetened drinks.  Exercise:  Exercise at least 30 minutes per day on most days of the week. Some examples of exercise include walking, biking, dancing, and swimming. You can also fit in more physical activity by taking the stairs instead of the elevator or parking farther away from stores. Ask your healthcare provider about the best exercise plan for you.      © Copyright [employer] 2018 Information is for End User's use only and may not be sold, redistributed or otherwise used for commercial purposes. All illustrations and images included in CareNotes® are the copyrighted property of [employer] or [employer]

## 2024-03-28 NOTE — PROGRESS NOTES
Assessment and Plan:     Problem List Items Addressed This Visit       Hyperlipidemia     States she is compliant w/daily atorvastatin as rx'd  Update FLP before next appt in 6 weeks         Relevant Orders    Comprehensive metabolic panel    Lipid Panel with Direct LDL reflex    Schizoaffective disorder, depressive type (HCC)     Continue meds as managed by psych         Bilateral leg edema - Primary     S/P normal lab work up in ED   Start daily furosemide & wear compression stockings as rx'd  Evaluate further w/venous doppler as ordered  Return in 6 weeks         Relevant Medications    furosemide (LASIX) 20 mg tablet    Other Relevant Orders    Compression Stocking     VAS VENOUS DUPLEX - LOWER LIMB BILATERAL    Comprehensive metabolic panel     Other Visit Diagnoses       Hyperglycemia        3/27 glucose of 164 - include A1C w/next labs ordered    Relevant Orders    Comprehensive metabolic panel    Hemoglobin A1C    Medicare annual wellness visit, subsequent        AWV updated, next due in 1 year    Encounter for screening mammogram for malignant neoplasm of breast        Relevant Orders    Mammo screening bilateral w 3d & cad    Incontinence in female        Relevant Medications    Incontinence Supply Disposable (Depend Adjustable Underwear) MISC            Depression Screening and Follow-up Plan: Patient was screened for depression during today's encounter. They screened negative with a PHQ-2 score of 2.      Preventive health issues were discussed with patient, and age appropriate screening tests were ordered as noted in patient's After Visit Summary.  Personalized health advice and appropriate referrals for health education or preventive services given if needed, as noted in patient's After Visit Summary.     History of Present Illness:     Patient presents for a Medicare Wellness Visit    New patient arrived w/group home  at 9:34am for a 9:20am appointment. Here to get established, due to update  "AWV.   Pt states she was in ED yesterday for leg swelling. Labs were OK & no meds were given. She is requesting script for lasix as was previously beneficial.  She denies concerns otherwise.       Patient Care Team:  SHANI Petersen as PCP - General (Family Medicine)     Review of Systems:     Review of Systems   Constitutional:  Positive for activity change (NWB in wheelchair due to leg swelling).   Respiratory:  Positive for shortness of breath (\"sometimes\", not currently).    Cardiovascular:  Positive for leg swelling (bilat feet and legs for awhile, seen in ED yesterday, had labs done and discharged (no meds given), requesting lasix today (previously took in her 30s)). Negative for chest pain and palpitations.   Musculoskeletal:  Positive for gait problem (due to leg swelling, in wheelchair currently; normal walks independently).   Psychiatric/Behavioral: Negative.          Problem List:     Patient Active Problem List   Diagnosis    Primary hypertension    Hyperlipidemia    Class 3 severe obesity due to excess calories without serious comorbidity in adult (HCC)    Chronic midline low back pain without sciatica    PTSD (post-traumatic stress disorder)    Borderline personality disorder (HCC)    Seizures (HCC)    Anemia    Schizoaffective disorder, depressive type (HCC)    Bilateral leg edema      Past Medical and Surgical History:     Past Medical History:   Diagnosis Date    Anxiety     Bipolar 1 disorder (HCC)     Bipolar affective disorder, depressed, severe ([facility]) 10/10/2021    Borderline personality disorder ([facility])     CAD (coronary artery disease)     Cognitive impairment     Depression     Dyslipidemia     GERD (gastroesophageal reflux disease)     Hypertension     Obesity     Psychiatric disorder     Psychiatric illness     PTSD (post-traumatic stress disorder)     Schizoaffective disorder (HCC)     Seizure (HCC)      Past Surgical History:   Procedure Laterality Date    APPENDECTOMY      PATIENT " "DENIES HAVING APPENDIX REMOVED    CHOLECYSTECTOMY      FOOT SURGERY Right       Family History:     Family History   Problem Relation Age of Onset    Kidney cancer Mother     Cerebral aneurysm Father       Social History:     Social History     Socioeconomic History    Marital status: Single     Spouse name: None    Number of children: None    Years of education: None    Highest education level: None   Occupational History    None   Tobacco Use    Smoking status: Former     Current packs/day: 0.25     Average packs/day: 0.3 packs/day for 0.1 years     Types: Cigarettes, Cigars     Start date: 2/5/2024    Smokeless tobacco: Never    Tobacco comments:     Pt stated \"smokes when stressed\"   Vaping Use    Vaping status: Never Used   Substance and Sexual Activity    Alcohol use: Not Currently     Comment: occasionally    Drug use: Not Currently    Sexual activity: None   Other Topics Concern    None   Social History Narrative    None     Social Determinants of Health     Financial Resource Strain: Not on file   Food Insecurity: No Food Insecurity (3/28/2024)    Hunger Vital Sign     Worried About Running Out of Food in the Last Year: Never true     Ran Out of Food in the Last Year: Never true   Transportation Needs: No Transportation Needs (3/28/2024)    PRAPARE - Transportation     Lack of Transportation (Medical): No     Lack of Transportation (Non-Medical): No   Physical Activity: Not on file   Stress: Not on file   Social Connections: Socially Isolated (7/26/2021)    Received from [facility]    Social Connection and Isolation Panel [NHANES]     Frequency of Communication with Friends and Family: More than three times a week     Frequency of Social Gatherings with Friends and Family: Never     Attends Orthodox Services: Never     Active Member of Clubs or Organizations: No     Attends Club or Organization Meetings: Never     Marital Status: Never    Intimate Partner Violence: Unknown (4/3/2021) "    Received from [facility]    Intimate Partner Violence     Within the last year, have you been afraid of your partner, ex-partner or family member?: Not on file     Within the last year, have you been humiliated or emotionally abused in other ways by your partner, ex-partner or family member?: Not on file     Within the last year, have you been kicked, hit, slapped, or otherwise physically hurt by your partner, ex-partner or family member?: Not on file     Within the last year, have you been raped or forced to have any kind of sexual activity by your partner, ex-partner or family member?: Not on file   Housing Stability: Unknown (3/28/2024)    Housing Stability Vital Sign     Unable to Pay for Housing in the Last Year: No     Number of Places Lived in the Last Year: Not on file     Unstable Housing in the Last Year: No      Medications and Allergies:     Current Outpatient Medications   Medication Sig Dispense Refill    acetaminophen (TYLENOL) 325 mg tablet Take 2 tablets (650 mg total) by mouth every 4 (four) hours as needed for mild pain 90 tablet 0    atorvastatin (LIPITOR) 10 mg tablet Take 1 tablet (10 mg total) by mouth daily at bedtime 30 tablet 0    benztropine (COGENTIN) 0.5 mg tablet       clonazePAM (KlonoPIN) 0.5 mg tablet Take 0.5 mg by mouth 2 (two) times a day as needed for anxiety      clonazePAM (KlonoPIN) 1 mg tablet Take 1 mg by mouth 2 (two) times a day      desvenlafaxine succinate (PRISTIQ) 100 mg 24 hr tablet Take 1 tablet (100 mg total) by mouth daily 30 tablet 0    Diclofenac Sodium (VOLTAREN) 1 % Apply 2 g topically 4 (four) times a day as needed (pain) 350 g 0    divalproex sodium (DEPAKOTE) 500 mg DR tablet Take 2 tablets (1,000 mg total) by mouth daily at bedtime 60 tablet 0    ergocalciferol (VITAMIN D2) 50,000 units Take 1 capsule (50,000 Units total) by mouth once a week for 11 doses 10 capsule 0    escitalopram (LEXAPRO) 10 mg tablet Take 10 mg by mouth daily       furosemide (LASIX) 20 mg tablet Take 1 tablet (20 mg total) by mouth daily 90 tablet 0    gabapentin (NEURONTIN) 100 mg capsule Take 1 capsule (100 mg total) by mouth 3 (three) times a day 90 capsule 0    ibuprofen (MOTRIN) 600 mg tablet Take 1 tablet (600 mg total) by mouth every 6 (six) hours as needed for headaches, fever or moderate pain 60 tablet 0    Incontinence Supply Disposable (Depend Adjustable Underwear) MISC Use as needed (as needed) Depends 3XL 1 each 3    lidocaine (LIDODERM) 5 % Apply 1 patch topically over 12 hours daily as needed (Daily PRN) Remove & Discard patch within 12 hours or as directed by MD 30 patch 0    lisinopril (ZESTRIL) 20 mg tablet Take 1 tablet (20 mg total) by mouth daily 30 tablet 0    meclizine (ANTIVERT) 12.5 MG tablet Take 1 tablet (12.5 mg total) by mouth every 8 (eight) hours as needed for dizziness 30 tablet 0    melatonin 3 mg Take 1 tablet (3 mg total) by mouth daily at bedtime 30 tablet 0    nicotine polacrilex (NICORETTE) 2 mg gum Chew 1 each (2 mg total) every 2 (two) hours as needed for smoking cessation 100 each 0    ondansetron (ZOFRAN-ODT) 4 mg disintegrating tablet Take 1 tablet (4 mg total) by mouth every 6 (six) hours as needed for nausea or vomiting 12 tablet 0    pantoprazole (PROTONIX) 20 mg tablet Take 1 tablet (20 mg total) by mouth daily 30 tablet 0    prazosin (MINIPRESS) 2 mg capsule Take 1 capsule (2 mg total) by mouth daily at bedtime 30 capsule 0    risperiDONE (RisperDAL) 3 mg tablet Take 1 tablet (3 mg total) by mouth 2 (two) times a day 60 tablet 0    traZODone (DESYREL) 50 mg tablet Take 1 tablet (50 mg total) by mouth daily at bedtime 30 tablet 0    ammonium lactate (LAC-HYDRIN) 12 % lotion Apply topically 2 (two) times a day (Patient not taking: Reported on 3/28/2024) 225 g 0    loperamide (IMODIUM) 2 mg capsule  (Patient not taking: Reported on 3/28/2024)       No current facility-administered medications for this visit.     Allergies    Allergen Reactions    Fish Oil - Food Allergy Other (See Comments)     unknown    Fish-Derived Products - Food Allergy Hives      Immunizations:     Immunization History   Administered Date(s) Administered    COVID-19 PFIZER VACCINE 0.3 ML IM 02/17/2021, 03/17/2021, 10/19/2021    COVID-19 Pfizer vac (Tristan-sucrose, gray cap) 12 yr+ IM 06/15/2022    INFLUENZA 10/10/2020, 10/19/2021    Influenza, injectable, quadrivalent, preservative free 0.5 mL 02/12/2024    Pneumococcal Polysaccharide PPV23 06/27/2017    Td (adult), adsorbed 08/17/2017    Tdap 08/21/2018      Health Maintenance:         Topic Date Due    Hepatitis C Screening  Never done    HIV Screening  Never done    Colorectal Cancer Screening  Never done    Breast Cancer Screening: Mammogram  03/16/2024    Cervical Cancer Screening  11/21/2028         Topic Date Due    Hepatitis A Vaccine (1 of 2 - Risk 2-dose series) Never done    COVID-19 Vaccine (5 - 2023-24 season) 09/01/2023      Medicare Screening Tests and Risk Assessments:     Leydi is here for her Subsequent Wellness visit.     Health Risk Assessment:   Patient rates overall health as fair. Patient feels that their physical health rating is same. Patient is satisfied with their life. Eyesight was rated as slightly worse. Hearing was rated as same. Patients states they are sometimes angry. Patient states they are often unusually tired/fatigued. Pain experienced in the last 7 days has been a lot. Patient's pain rating has been 10/10. Patient states that she has experienced no weight loss or gain in last 6 months.     Depression Screening:   PHQ-2 Score: 2      Fall Risk Screening:   In the past year, patient has experienced: no history of falling in past year      Urinary Incontinence Screening:   Patient has leaked urine accidently in the last six months.     Home Safety:  Patient has trouble with stairs inside or outside of their home. Patient has working smoke alarms and has working carbon monoxide  "detector. Home safety hazards include: none.     Nutrition:   Current diet is Regular.     Medications:   Patient is currently taking over-the-counter supplements. OTC medications include: see medication list. Patient is not able to manage medications.     Activities of Daily Living (ADLs)/Instrumental Activities of Daily Living (IADLs):   Walk and transfer into and out of bed and chair?: No  Dress and groom yourself?: No    Bathe or shower yourself?: No    Feed yourself? Yes  Do your laundry/housekeeping?: No  Manage your money, pay your bills and track your expenses?: No  Make your own meals?: No    Do your own shopping?: No    Advance Care Planning:   Living will: No    Durable POA for healthcare: Yes    Advanced directive: No      PREVENTIVE SCREENINGS      Cardiovascular Screening:    General: Screening Not Indicated and History Lipid Disorder      Diabetes Screening:     General: Risks and Benefits Discussed    Due for: Blood Glucose      Colorectal Cancer Screening:       Due for: Colonoscopy - Low Risk      Breast Cancer Screening:     General: Screening Current      Cervical Cancer Screening:    General: Screening Current      Osteoporosis Screening:    General: Screening Not Indicated      Abdominal Aortic Aneurysm (AAA) Screening:        General: Screening Not Indicated      Lung Cancer Screening:     General: Screening Not Indicated    Vision Screening (Inadequate exam)   Comments: Patient refuses eye exam, says her vision is too blurry to see       Physical Exam:     /72   Pulse 104   Temp 97.8 °F (36.6 °C)   Ht 5' 6\" (1.676 m)   BMI 46.29 kg/m²       Physical Exam  Vitals reviewed.   Constitutional:       General: She is not in acute distress.     Appearance: She is obese.   HENT:      Head: Normocephalic.   Eyes:      General: No scleral icterus.  Neck:      Thyroid: No thyromegaly.   Cardiovascular:      Rate and Rhythm: Normal rate and regular rhythm.      Heart sounds: No murmur " heard.  Pulmonary:      Effort: Pulmonary effort is normal. No respiratory distress.      Breath sounds: Normal breath sounds.   Musculoskeletal:      Cervical back: Normal range of motion.      Right lower leg: Edema (+3-4 non-pitting) present.      Left lower leg: Edema (+3 non-pitting) present.   Lymphadenopathy:      Cervical: No cervical adenopathy.   Skin:     General: Skin is warm and dry.      Findings: No erythema or rash.   Neurological:      General: No focal deficit present.      Mental Status: She is alert and oriented to person, place, and time.      Motor: Weakness (bilat LE) present.      Gait: Gait abnormal (NWB in wheelchair).   Psychiatric:         Attention and Perception: Attention normal.         Mood and Affect: Mood normal.         Speech: Speech normal.         Behavior: Behavior normal. Behavior is cooperative.         Thought Content: Thought content normal.         Cognition and Memory: Cognition normal.          SHANI Petersen

## 2024-03-29 ENCOUNTER — TELEPHONE (OUTPATIENT)
Dept: ADMINISTRATIVE | Facility: OTHER | Age: 54
End: 2024-03-29

## 2024-03-29 NOTE — TELEPHONE ENCOUNTER
Upon review of the In Basket request we were able to locate, review, and update the patient chart as requested for Pap Smear (HPV) aka Cervical Cancer Screening.    Any additional questions or concerns should be emailed to the Practice Liaisons via the appropriate education email address, please do not reply via In Basket.    Thank you  Rodrigo Winston MA

## 2024-03-29 NOTE — TELEPHONE ENCOUNTER
----- Message from Yoanna Quan sent at 3/28/2024 12:45 PM EDT -----  Regarding: [facility], Pap  03/28/24 12:46 PM    Nelson, our patient Leydi Khan has had Pap Smear (HPV) aka Cervical Cancer Screening completed/performed. Please assist in updating the patient chart by pulling the Care Everywhere (CE) document. The date of service is 11/23/2023, result is dated 12/23/2023.     Thank you,  Yoanna Quan  [facility] [address] [address]

## 2024-04-04 ENCOUNTER — TELEPHONE (OUTPATIENT)
Dept: GASTROENTEROLOGY | Facility: CLINIC | Age: 54
End: 2024-04-04

## 2024-04-04 ENCOUNTER — OFFICE VISIT (OUTPATIENT)
Dept: GASTROENTEROLOGY | Facility: CLINIC | Age: 54
End: 2024-04-04
Payer: MEDICARE

## 2024-04-04 VITALS
WEIGHT: 290.8 LBS | DIASTOLIC BLOOD PRESSURE: 70 MMHG | BODY MASS INDEX: 49.64 KG/M2 | SYSTOLIC BLOOD PRESSURE: 127 MMHG | HEIGHT: 64 IN

## 2024-04-04 DIAGNOSIS — E66.01 OBESITY, CLASS III, BMI 40-49.9 (MORBID OBESITY) (HCC): ICD-10-CM

## 2024-04-04 DIAGNOSIS — Z12.12 SCREENING FOR COLORECTAL CANCER: Primary | ICD-10-CM

## 2024-04-04 DIAGNOSIS — Z12.11 SCREENING FOR COLORECTAL CANCER: Primary | ICD-10-CM

## 2024-04-04 PROCEDURE — 99203 OFFICE O/P NEW LOW 30 MIN: CPT | Performed by: INTERNAL MEDICINE

## 2024-04-04 RX ORDER — BISACODYL 5 MG/1
TABLET, DELAYED RELEASE ORAL
Qty: 4 TABLET | Refills: 0 | Status: SHIPPED | OUTPATIENT
Start: 2024-04-04

## 2024-04-04 RX ORDER — POLYETHYLENE GLYCOL 3350 17 G/17G
238 POWDER, FOR SOLUTION ORAL ONCE
Qty: 238 G | Refills: 0 | Status: SHIPPED | OUTPATIENT
Start: 2024-04-04 | End: 2024-04-04

## 2024-04-04 NOTE — PROGRESS NOTES
[facility] - Outpatient Consultation  Leydi Khan 54 y.o. female MRN: [id number]  Encounter: [id number]    ASSESSMENT AND PLAN:    1. Screening for colorectal cancer  -     Colonoscopy; Future; Expected date: 04/04/2024  -     polyethylene glycol (MiraLax) 17 GM/SCOOP powder; Take 238 g by mouth once for 1 dose Take 238 g my mouth. Use as directed  -     bisacodyl (DULCOLAX) 5 mg EC tablet; Bowel Prep: four (4) 5 mg tablets as directed    2. Obesity, Class III, BMI 40-49.9 (morbid obesity) ([facility])      Screening for colorectal cancer.  The patient is due for a colorectal cancer screening, as she has an average risk for colorectal cancer. The proposed plan of action involves a colonoscopy with MiraLAX preparation. The patient has been informed of the associated risks, benefits, and alternatives, and has expressed her desire to proceed with the colonoscopy. Consent has been obtained during the visit.      ---    Chief Complaint   Patient presents with   • Schedule Colonoscopy       HPI:   Leydi Khan is a 54 y.o. year old female who is presenting for evaluation for colorectal cancer screening. The patient has a past medical history of seizures, schizoaffective disorder, and obesity.    She reports no gastrointestinal symptoms such as nausea, vomiting, or abdominal pain. Her bowel movements are regular, occurring daily, and she denies any hematochezia. She is not currently on any anticoagulant therapy.     Her surgical history includes a cholecystectomy.    She is adopted, so she is unsure of any family history of colon cancer.    LAB/RADIOLOGY/ENDOSCOPY RESULTS:     Historical Information   Past Medical History:   Diagnosis Date   • Anxiety    • Bipolar 1 disorder (HCC)    • Bipolar affective disorder, depressed, severe (HCC) 10/10/2021   • Borderline personality disorder (HCC)    • CAD (coronary artery disease)    • Cognitive impairment    • Depression    • Dyslipidemia    • GERD  "(gastroesophageal reflux disease)    • Hypertension    • Obesity    • Psychiatric disorder    • Psychiatric illness    • PTSD (post-traumatic stress disorder)    • Schizoaffective disorder (HCC)    • Seizure (HCC)      Past Surgical History:   Procedure Laterality Date   • APPENDECTOMY      PATIENT DENIES HAVING APPENDIX REMOVED   • CHOLECYSTECTOMY     • FOOT SURGERY Right      Social History     Substance and Sexual Activity   Alcohol Use Not Currently    Comment: occasionally     Social History     Substance and Sexual Activity   Drug Use Not Currently     Social History     Tobacco Use   Smoking Status Former   • Current packs/day: 0.25   • Average packs/day: 0.3 packs/day for 0.2 years   • Types: Cigarettes, Cigars   • Start date: 2/5/2024   Smokeless Tobacco Never   Tobacco Comments    Pt stated \"smokes when stressed\"     Family History   Problem Relation Age of Onset   • Kidney cancer Mother    • Cerebral aneurysm Father        Meds/Allergies     Current Outpatient Medications:   •  acetaminophen (TYLENOL) 325 mg tablet  •  atorvastatin (LIPITOR) 10 mg tablet  •  benztropine (COGENTIN) 0.5 mg tablet  •  bisacodyl (DULCOLAX) 5 mg EC tablet  •  clonazePAM (KlonoPIN) 0.5 mg tablet  •  clonazePAM (KlonoPIN) 1 mg tablet  •  desvenlafaxine succinate (PRISTIQ) 100 mg 24 hr tablet  •  Diclofenac Sodium (VOLTAREN) 1 %  •  divalproex sodium (DEPAKOTE) 500 mg DR tablet  •  escitalopram (LEXAPRO) 10 mg tablet  •  furosemide (LASIX) 20 mg tablet  •  gabapentin (NEURONTIN) 100 mg capsule  •  ibuprofen (MOTRIN) 600 mg tablet  •  Incontinence Supply Disposable (Depend Adjustable Underwear) MISC  •  lidocaine (LIDODERM) 5 %  •  lisinopril (ZESTRIL) 20 mg tablet  •  meclizine (ANTIVERT) 12.5 MG tablet  •  melatonin 3 mg  •  nicotine polacrilex (NICORETTE) 2 mg gum  •  ondansetron (ZOFRAN-ODT) 4 mg disintegrating tablet  •  pantoprazole (PROTONIX) 20 mg tablet  •  polyethylene glycol (MiraLax) 17 GM/SCOOP powder  •  prazosin " "(MINIPRESS) 2 mg capsule  •  risperiDONE (RisperDAL) 3 mg tablet  •  traZODone (DESYREL) 50 mg tablet  •  ammonium lactate (LAC-HYDRIN) 12 % lotion  •  ergocalciferol (VITAMIN D2) 50,000 units  •  loperamide (IMODIUM) 2 mg capsule  Allergies   Allergen Reactions   • Fish Oil - Food Allergy Other (See Comments)     unknown   • Fish-Derived Products - Food Allergy Hives       PHYSICAL EXAM:    Blood pressure 127/70, height 5' 4\" (1.626 m), weight 132 kg (290 lb 12.8 oz). Body mass index is 49.92 kg/m².  General Appearance: No apparent distress, cooperative, alert.  Eyes: Anicteric.  Gastrointestinal: Soft, non-tender, non-distended; normal bowel sounds; no masses, no organomegaly.  Rectal: Deferred.  Musculoskeletal: No edema.  Skin: No jaundice.    OTHER LAB RESULTS:   Lab Results   Component Value Date    WBC 5.83 03/27/2024    WBC 6.78 03/14/2024    WBC 8.24 02/11/2024    HGB 12.4 03/27/2024    HGB 12.9 03/14/2024    HGB 12.7 02/11/2024    MCV 93 03/27/2024     03/27/2024     03/14/2024     02/11/2024    INR 0.98 02/26/2023    INR 0.97 01/26/2023     Lab Results   Component Value Date    K 4.0 03/27/2024    CL 97 03/27/2024    CO2 32 03/27/2024    BUN 14 03/27/2024    CREATININE 0.47 (L) 03/27/2024    GLUF 130 (H) 12/13/2022    CALCIUM 9.4 03/27/2024    CORRECTEDCA 9.1 06/29/2022    AST 17 03/27/2024    AST 16 03/14/2024    AST 14 02/11/2024    ALT 25 03/27/2024    ALT 20 03/14/2024    ALT 15 02/11/2024    ALKPHOS 57 03/27/2024    ALKPHOS 65 03/14/2024    ALKPHOS 58 02/11/2024    EGFR 113 03/27/2024     No results found for: \"IRON\", \"TIBC\", \"FERRITIN\"  Lab Results   Component Value Date    LIPASE 12 10/23/2023       OTHER RADIOLOGY RESULTS:   XR chest 1 view portable    Result Date: 3/27/2024  Narrative: XR CHEST PORTABLE INDICATION: edema. COMPARISON: 3/14/2024. FINDINGS: No focal airspace consolidation, pleural effusion, signs of congestion, or pneumothorax. Cardiomediastinal silhouette is " unremarkable. Osseous structures are grossly intact.     Impression: No acute cardiopulmonary disease. Workstation performed: [id number]     XR chest 1 view portable    Result Date: 3/14/2024  Narrative: XR CHEST PORTABLE INDICATION: lightheadness. COMPARISON: Chest radiograph dated 8/26/2023. FINDINGS: No focal airspace consolidation. No pneumothorax or pleural effusion. Unremarkable cardiomediastinal silhouette. Bones are unremarkable for age.     Impression: No acute cardiopulmonary disease. Workstation performed: [id number]       Transcribed for Mounika George MD, by Ana Molina on 04/04/24 at 12:19 PM. Powered by Dragon [employer].

## 2024-04-04 NOTE — TELEPHONE ENCOUNTER
Scheduled date of colonoscopy (as of today):[id number]  Physician performing colonoscopy:Ariel  Location of colonoscopy:[facility]  Bowel prep reviewed with patient:Miralax  Instructions reviewed with patient by:DORIS  Clearances: NONE    **Patient lives in a facility and needs the miralax prep sent to the pharmacy.**

## 2024-04-07 ENCOUNTER — HOSPITAL ENCOUNTER (EMERGENCY)
Facility: HOSPITAL | Age: 54
Discharge: HOME | End: 2024-04-07
Attending: EMERGENCY MEDICINE
Payer: MEDICARE

## 2024-04-07 VITALS
TEMPERATURE: 98 F | RESPIRATION RATE: 20 BRPM | HEART RATE: 71 BPM | SYSTOLIC BLOOD PRESSURE: 136 MMHG | DIASTOLIC BLOOD PRESSURE: 73 MMHG | OXYGEN SATURATION: 96 %

## 2024-04-07 DIAGNOSIS — F25.1 SCHIZOAFFECTIVE DISORDER, DEPRESSIVE TYPE (HCC): Primary | ICD-10-CM

## 2024-04-07 DIAGNOSIS — R45.851 SUICIDAL IDEATION: ICD-10-CM

## 2024-04-07 LAB — ETHANOL EXG-MCNC: 0 MG/DL

## 2024-04-07 PROCEDURE — 99285 EMERGENCY DEPT VISIT HI MDM: CPT | Performed by: EMERGENCY MEDICINE

## 2024-04-07 PROCEDURE — 82075 ASSAY OF BREATH ETHANOL: CPT | Performed by: EMERGENCY MEDICINE

## 2024-04-07 NOTE — ED PROVIDER NOTES
History  Chief Complaint   Patient presents with    Psychiatric Evaluation     Pt coming in for SI. Pt coming for wanting to cut throat with fork last night.      Patient is a 54-year-old female brought in by ambulance due to suicidal ideations.  Patient states that she wants to use a fork to cut her throat.  Patient reports that she continues to have suicidal ideations.  Denies SI, AH, VH.          Prior to Admission Medications   Prescriptions Last Dose Informant Patient Reported? Taking?   Diclofenac Sodium (VOLTAREN) 1 %  Self No No   Sig: Apply 2 g topically 4 (four) times a day as needed (pain)   Incontinence Supply Disposable (Depend Adjustable Underwear) MISC   No No   Sig: Use as needed (as needed) Depends 3XL   acetaminophen (TYLENOL) 325 mg tablet  Self No No   Sig: Take 2 tablets (650 mg total) by mouth every 4 (four) hours as needed for mild pain   ammonium lactate (LAC-HYDRIN) 12 % lotion  Self No No   Sig: Apply topically 2 (two) times a day   Patient not taking: Reported on 3/28/2024   atorvastatin (LIPITOR) 10 mg tablet  Self No No   Sig: Take 1 tablet (10 mg total) by mouth daily at bedtime   benztropine (COGENTIN) 0.5 mg tablet  Self Yes No   bisacodyl (DULCOLAX) 5 mg EC tablet   No No   Sig: Bowel Prep: four (4) 5 mg tablets as directed   clonazePAM (KlonoPIN) 0.5 mg tablet  Self Yes No   Sig: Take 0.5 mg by mouth 2 (two) times a day as needed for anxiety   clonazePAM (KlonoPIN) 1 mg tablet  Self Yes No   Sig: Take 1 mg by mouth 2 (two) times a day   desvenlafaxine succinate (PRISTIQ) 100 mg 24 hr tablet  Self No No   Sig: Take 1 tablet (100 mg total) by mouth daily   divalproex sodium (DEPAKOTE) 500 mg DR tablet  Self No No   Sig: Take 2 tablets (1,000 mg total) by mouth daily at bedtime   ergocalciferol (VITAMIN D2) 50,000 units  Self No No   Sig: Take 1 capsule (50,000 Units total) by mouth once a week for 11 doses   escitalopram (LEXAPRO) 10 mg tablet  Self Yes No   Sig: Take 10 mg by mouth  daily   furosemide (LASIX) 20 mg tablet   No No   Sig: Take 1 tablet (20 mg total) by mouth daily   gabapentin (NEURONTIN) 100 mg capsule  Self No No   Sig: Take 1 capsule (100 mg total) by mouth 3 (three) times a day   ibuprofen (MOTRIN) 600 mg tablet  Self No No   Sig: Take 1 tablet (600 mg total) by mouth every 6 (six) hours as needed for headaches, fever or moderate pain   lidocaine (LIDODERM) 5 %  Self No No   Sig: Apply 1 patch topically over 12 hours daily as needed (Daily PRN) Remove & Discard patch within 12 hours or as directed by MD   lisinopril (ZESTRIL) 20 mg tablet  Self No No   Sig: Take 1 tablet (20 mg total) by mouth daily   loperamide (IMODIUM) 2 mg capsule  Self Yes No   Patient not taking: Reported on 3/28/2024   meclizine (ANTIVERT) 12.5 MG tablet  Self No No   Sig: Take 1 tablet (12.5 mg total) by mouth every 8 (eight) hours as needed for dizziness   melatonin 3 mg  Self No No   Sig: Take 1 tablet (3 mg total) by mouth daily at bedtime   nicotine polacrilex (NICORETTE) 2 mg gum  Self No No   Sig: Chew 1 each (2 mg total) every 2 (two) hours as needed for smoking cessation   ondansetron (ZOFRAN-ODT) 4 mg disintegrating tablet   No No   Sig: Take 1 tablet (4 mg total) by mouth every 6 (six) hours as needed for nausea or vomiting   pantoprazole (PROTONIX) 20 mg tablet  Self No No   Sig: Take 1 tablet (20 mg total) by mouth daily   polyethylene glycol (MiraLax) 17 GM/SCOOP powder   No No   Sig: Take 238 g by mouth once for 1 dose Take 238 g my mouth. Use as directed   prazosin (MINIPRESS) 2 mg capsule  Self No No   Sig: Take 1 capsule (2 mg total) by mouth daily at bedtime   risperiDONE (RisperDAL) 3 mg tablet  Self No No   Sig: Take 1 tablet (3 mg total) by mouth 2 (two) times a day   traZODone (DESYREL) 50 mg tablet  Self No No   Sig: Take 1 tablet (50 mg total) by mouth daily at bedtime      Facility-Administered Medications: None       Past Medical History:   Diagnosis Date    Anxiety      "Bipolar 1 disorder (HCC)     Bipolar affective disorder, depressed, severe (HCC) 10/10/2021    Borderline personality disorder (HCC)     CAD (coronary artery disease)     Cognitive impairment     Depression     Dyslipidemia     GERD (gastroesophageal reflux disease)     Hypertension     Obesity     Psychiatric disorder     Psychiatric illness     PTSD (post-traumatic stress disorder)     Schizoaffective disorder (HCC)     Seizure (HCC)        Past Surgical History:   Procedure Laterality Date    APPENDECTOMY      PATIENT DENIES HAVING APPENDIX REMOVED    CHOLECYSTECTOMY      FOOT SURGERY Right        Family History   Problem Relation Age of Onset    Kidney cancer Mother     Cerebral aneurysm Father      I have reviewed and agree with the history as documented.    E-Cigarette/Vaping    E-Cigarette Use Never User      E-Cigarette/Vaping Substances    Nicotine No     THC No     CBD No     Flavoring No     Other No     Unknown No      Social History     Tobacco Use    Smoking status: Former     Current packs/day: 0.25     Average packs/day: 0.3 packs/day for 0.2 years     Types: Cigarettes, Cigars     Start date: 2/5/2024    Smokeless tobacco: Never    Tobacco comments:     Pt stated \"smokes when stressed\"   Vaping Use    Vaping status: Never Used   Substance Use Topics    Alcohol use: Not Currently     Comment: occasionally    Drug use: Not Currently       Review of Systems   Psychiatric/Behavioral:  Positive for suicidal ideas.        Physical Exam  Physical Exam  Vitals and nursing note reviewed.   Constitutional:       General: She is not in acute distress.     Appearance: Normal appearance. She is obese. She is not ill-appearing, toxic-appearing or diaphoretic.   HENT:      Head: Normocephalic and atraumatic.      Mouth/Throat:      Mouth: Mucous membranes are moist.   Eyes:      Conjunctiva/sclera: Conjunctivae normal.      Pupils: Pupils are equal, round, and reactive to light.   Neck:      Comments: No wounds or " marks on patient's neck  Cardiovascular:      Rate and Rhythm: Normal rate and regular rhythm.      Pulses: Normal pulses.      Heart sounds: Normal heart sounds. No murmur heard.  Pulmonary:      Effort: Pulmonary effort is normal. No respiratory distress.      Breath sounds: Normal breath sounds. No stridor. No wheezing, rhonchi or rales.   Chest:      Chest wall: No tenderness.   Abdominal:      General: Bowel sounds are normal. There is no distension.      Palpations: Abdomen is soft.      Tenderness: There is no abdominal tenderness. There is no guarding or rebound.   Skin:     General: Skin is warm and dry.   Neurological:      General: No focal deficit present.      Mental Status: She is alert and oriented to person, place, and time. Mental status is at baseline.   Psychiatric:         Behavior: Behavior is cooperative.         Thought Content: Thought content includes suicidal ideation. Thought content does not include homicidal ideation. Thought content includes suicidal plan. Thought content does not include homicidal plan.         Vital Signs  ED Triage Vitals [04/07/24 1427]   Temperature Pulse Respirations Blood Pressure SpO2   98 °F (36.7 °C) 95 20 137/76 95 %      Temp Source Heart Rate Source Patient Position - Orthostatic VS BP Location FiO2 (%)   Oral Monitor Sitting Left arm --      Pain Score       --           Vitals:    04/07/24 1427   BP: 137/76   Pulse: 95   Patient Position - Orthostatic VS: Sitting         Visual Acuity      ED Medications  Medications - No data to display    Diagnostic Studies  Results Reviewed       Procedure Component Value Units Date/Time    POCT alcohol breath test [[id number]]  (Normal) Resulted: 04/07/24 1456    Lab Status: Final result Updated: 04/07/24 1456     EXTBreath Alcohol 0.000                   No orders to display              Procedures  Procedures         ED Course  ED Course as of 04/07/24 1540   Sun Apr 07, 2024   1518 Patient care signed out to   Daley.  Patient is a 54-year-old female who presents with suicidal ideations.  There is a psych consult in place.  If psychiatry clears the patient then she can go back to the group home, but if psychiatry believes that the patient needs to be admitted for inpatient, she will need labs for medical clearance and crisis evaluation for placement.                               SBIRT 22yo+      Flowsheet Row Most Recent Value   Initial Alcohol Screen: US AUDIT-C     1. How often do you have a drink containing alcohol? 0 Filed at: 04/07/2024 1436   2. How many drinks containing alcohol do you have on a typical day you are drinking?  0 Filed at: 04/07/2024 1436   3b. FEMALE Any Age, or MALE 65+: How often do you have 4 or more drinks on one occassion? 0 Filed at: 04/07/2024 1436   Audit-C Score 0 Filed at: 04/07/2024 1436   DAT: How many times in the past year have you...    Used an illegal drug or used a prescription medication for non-medical reasons? Never Filed at: 04/07/2024 1436                      Medical Decision Making    Assessment and plan:  Will obtain psychiatric consult in order to assess whether patient needs inpatient treatment or if this is within patient's baseline psychiatric disorder.      Amount and/or Complexity of Data Reviewed  Labs: ordered.    Risk  Decision regarding hospitalization.             Disposition  Final diagnoses:   Schizoaffective disorder, depressive type (HCC)   Suicidal ideation     Time reflects when diagnosis was documented in both MDM as applicable and the Disposition within this note       Time User Action Codes Description Comment    4/7/2024  2:56 PM Mara Dumont Add [F25.1] Schizoaffective disorder, depressive type (HCC)     4/7/2024  2:56 PM Mara Dumont Add [R45.851] Suicidal ideation           ED Disposition       ED Disposition   Transfer to Behavioral Health    Condition   --    Date/Time   Sun Apr 7, 2024 5833    Comment   Leydi Khan has been medically  cleared and is pending psychiatric evaluation.               Follow-up Information    None         Patient's Medications   Discharge Prescriptions    No medications on file       No discharge procedures on file.    PDMP Review         Value Time User    PDMP Reviewed  Yes 2/12/2024 11:26 AM SHANI Hawk            ED Provider  Electronically Signed by             Mara Dumont DO  04/07/24 1047
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
## 2024-04-07 NOTE — ED NOTES
"Pt facility called at this time for p/u. Pt refusing to change into her clothes at this time. Pt states \"can't I just wear my shoes.\"      Iliana Petersen RN  04/07/24 1922    "

## 2024-04-10 ENCOUNTER — TELEPHONE (OUTPATIENT)
Dept: FAMILY MEDICINE CLINIC | Facility: HOSPITAL | Age: 54
End: 2024-04-10

## 2024-04-10 NOTE — TELEPHONE ENCOUNTER
LM madelyn Carlisle to call our office to schedule an appointment  Did you want to write a PRN script for diarrhea, please advise, thank you

## 2024-04-10 NOTE — TELEPHONE ENCOUNTER
Hi, my name's Maylin. I'm calling from [facility] and [facility]. I'm calling in regards to two of our residents. One of them has a newer patient at your office. Her name is Leydi Khan. Her birth date is [birth year]. Maybe it's having some diarrhea and I wanted to make her an appointment, but I also wanted to see if maybe we could get an order for some like PRN Imodium she could take until we could get her in. And the other resident is Alberta Trevino. Her birthday is [birth year]. Marietta is trying to quit smoking right now. We got patches sent to us last week for her. Thank you for that, but she's still kind of smoking on it. I was wondering if maybe we could get some like nicotine gum to better help her with like, the random cravings throughout the day. Give me a call back [phone]. Thank you. Sean.      **ary [address] is taking care of alberta trevino

## 2024-04-11 DIAGNOSIS — R19.7 DIARRHEA, UNSPECIFIED TYPE: Primary | ICD-10-CM

## 2024-04-11 RX ORDER — LOPERAMIDE HYDROCHLORIDE 2 MG/1
2 CAPSULE ORAL 4 TIMES DAILY PRN
Qty: 30 CAPSULE | Refills: 0 | Status: SHIPPED | OUTPATIENT
Start: 2024-04-11

## 2024-04-11 NOTE — TELEPHONE ENCOUNTER
She already had imodium listed on her med list, but I did reorder it to take QID prn. A GI consult was ordered at her recent appt so they should schedule her with GI to evaluate the diarrhea - she is due for colonoscopy

## 2024-04-12 NOTE — TELEPHONE ENCOUNTER
Spoke to Maylin, made aware of script, she also reports 5/14/24 is ok to wait utnil seen next, declined anything sooner.  Will get GI scheduled as well.

## 2024-04-20 ENCOUNTER — APPOINTMENT (OUTPATIENT)
Dept: RADIOLOGY | Facility: HOSPITAL | Age: 54
End: 2024-04-20
Payer: MEDICARE

## 2024-04-20 ENCOUNTER — APPOINTMENT (EMERGENCY)
Dept: CT IMAGING | Facility: HOSPITAL | Age: 54
End: 2024-04-20
Payer: MEDICARE

## 2024-04-20 ENCOUNTER — HOSPITAL ENCOUNTER (EMERGENCY)
Facility: HOSPITAL | Age: 54
Discharge: HOME | End: 2024-04-20
Attending: EMERGENCY MEDICINE | Admitting: EMERGENCY MEDICINE
Payer: MEDICARE

## 2024-04-20 VITALS
HEART RATE: 83 BPM | TEMPERATURE: 98.2 F | OXYGEN SATURATION: 98 % | DIASTOLIC BLOOD PRESSURE: 71 MMHG | RESPIRATION RATE: 20 BRPM | SYSTOLIC BLOOD PRESSURE: 156 MMHG

## 2024-04-20 DIAGNOSIS — R42 DIZZINESS: Primary | ICD-10-CM

## 2024-04-20 DIAGNOSIS — R07.9 CHEST PAIN: ICD-10-CM

## 2024-04-20 LAB
ALBUMIN SERPL BCP-MCNC: 4.3 G/DL (ref 3.5–5)
ALP SERPL-CCNC: 59 U/L (ref 34–104)
ALT SERPL W P-5'-P-CCNC: 41 U/L (ref 7–52)
ANION GAP SERPL CALCULATED.3IONS-SCNC: 6 MMOL/L (ref 4–13)
AST SERPL W P-5'-P-CCNC: 24 U/L (ref 13–39)
ATRIAL RATE: 82 BPM
BACTERIA UR QL AUTO: ABNORMAL /HPF
BASOPHILS # BLD AUTO: 0.03 THOUSANDS/ÂΜL (ref 0–0.1)
BASOPHILS NFR BLD AUTO: 0 % (ref 0–1)
BILIRUB SERPL-MCNC: 0.45 MG/DL (ref 0.2–1)
BILIRUB UR QL STRIP: NEGATIVE
BUN SERPL-MCNC: 8 MG/DL (ref 5–25)
CALCIUM SERPL-MCNC: 9.1 MG/DL (ref 8.4–10.2)
CARDIAC TROPONIN I PNL SERPL HS: <2 NG/L
CHLORIDE SERPL-SCNC: 100 MMOL/L (ref 96–108)
CLARITY UR: CLEAR
CO2 SERPL-SCNC: 31 MMOL/L (ref 21–32)
COLOR UR: YELLOW
CREAT SERPL-MCNC: 0.54 MG/DL (ref 0.6–1.3)
EOSINOPHIL # BLD AUTO: 0.09 THOUSAND/ÂΜL (ref 0–0.61)
EOSINOPHIL NFR BLD AUTO: 1 % (ref 0–6)
ERYTHROCYTE [DISTWIDTH] IN BLOOD BY AUTOMATED COUNT: 14.3 % (ref 11.6–15.1)
GFR SERPL CREATININE-BSD FRML MDRD: 108 ML/MIN/1.73SQ M
GLUCOSE SERPL-MCNC: 114 MG/DL (ref 65–140)
GLUCOSE UR STRIP-MCNC: NEGATIVE MG/DL
HCT VFR BLD AUTO: 35.3 % (ref 34.8–46.1)
HGB BLD-MCNC: 11.6 G/DL (ref 11.5–15.4)
HGB UR QL STRIP.AUTO: NEGATIVE
IMM GRANULOCYTES # BLD AUTO: 0.17 THOUSAND/UL (ref 0–0.2)
IMM GRANULOCYTES NFR BLD AUTO: 2 % (ref 0–2)
KETONES UR STRIP-MCNC: NEGATIVE MG/DL
LEUKOCYTE ESTERASE UR QL STRIP: ABNORMAL
LYMPHOCYTES # BLD AUTO: 1.87 THOUSANDS/ÂΜL (ref 0.6–4.47)
LYMPHOCYTES NFR BLD AUTO: 26 % (ref 14–44)
MCH RBC QN AUTO: 31.4 PG (ref 26.8–34.3)
MCHC RBC AUTO-ENTMCNC: 32.9 G/DL (ref 31.4–37.4)
MCV RBC AUTO: 95 FL (ref 82–98)
MONOCYTES # BLD AUTO: 0.77 THOUSAND/ÂΜL (ref 0.17–1.22)
MONOCYTES NFR BLD AUTO: 11 % (ref 4–12)
NEUTROPHILS # BLD AUTO: 4.34 THOUSANDS/ÂΜL (ref 1.85–7.62)
NEUTS SEG NFR BLD AUTO: 60 % (ref 43–75)
NITRITE UR QL STRIP: NEGATIVE
NON-SQ EPI CELLS URNS QL MICRO: ABNORMAL /HPF
NRBC BLD AUTO-RTO: 0 /100 WBCS
P AXIS: 31 DEGREES
PH UR STRIP.AUTO: 6.5 [PH]
PLATELET # BLD AUTO: 175 THOUSANDS/UL (ref 149–390)
PMV BLD AUTO: 9.5 FL (ref 8.9–12.7)
POTASSIUM SERPL-SCNC: 4 MMOL/L (ref 3.5–5.3)
PR INTERVAL: 164 MS
PROT SERPL-MCNC: 7.6 G/DL (ref 6.4–8.4)
PROT UR STRIP-MCNC: NEGATIVE MG/DL
QRS AXIS: 33 DEGREES
QRSD INTERVAL: 86 MS
QT INTERVAL: 380 MS
QTC INTERVAL: 443 MS
RBC # BLD AUTO: 3.7 MILLION/UL (ref 3.81–5.12)
RBC #/AREA URNS AUTO: ABNORMAL /HPF
SODIUM SERPL-SCNC: 137 MMOL/L (ref 135–147)
SP GR UR STRIP.AUTO: 1.01 (ref 1–1.03)
T WAVE AXIS: 40 DEGREES
UROBILINOGEN UR STRIP-ACNC: 2 MG/DL
VENTRICULAR RATE: 82 BPM
WBC # BLD AUTO: 7.27 THOUSAND/UL (ref 4.31–10.16)
WBC #/AREA URNS AUTO: ABNORMAL /HPF

## 2024-04-20 PROCEDURE — 80053 COMPREHEN METABOLIC PANEL: CPT | Performed by: EMERGENCY MEDICINE

## 2024-04-20 PROCEDURE — 71045 X-RAY EXAM CHEST 1 VIEW: CPT

## 2024-04-20 PROCEDURE — 85025 COMPLETE CBC W/AUTO DIFF WBC: CPT | Performed by: EMERGENCY MEDICINE

## 2024-04-20 PROCEDURE — 93010 ELECTROCARDIOGRAM REPORT: CPT | Performed by: INTERNAL MEDICINE

## 2024-04-20 PROCEDURE — 84484 ASSAY OF TROPONIN QUANT: CPT | Performed by: EMERGENCY MEDICINE

## 2024-04-20 PROCEDURE — 93005 ELECTROCARDIOGRAM TRACING: CPT

## 2024-04-20 PROCEDURE — 99285 EMERGENCY DEPT VISIT HI MDM: CPT | Performed by: EMERGENCY MEDICINE

## 2024-04-20 PROCEDURE — 70450 CT HEAD/BRAIN W/O DYE: CPT

## 2024-04-20 PROCEDURE — 81001 URINALYSIS AUTO W/SCOPE: CPT | Performed by: PHYSICIAN ASSISTANT

## 2024-04-20 PROCEDURE — 99285 EMERGENCY DEPT VISIT HI MDM: CPT

## 2024-04-20 PROCEDURE — 36415 COLL VENOUS BLD VENIPUNCTURE: CPT

## 2024-04-20 NOTE — DISCHARGE INSTRUCTIONS
Rest, increase fluids.  Follow up with family doctor in 2 days for recheck.  Return to ER if symptoms worsen.

## 2024-04-20 NOTE — ED PROVIDER NOTES
"History  Chief Complaint   Patient presents with    Dizziness     Pt states \"I have pressure in my chest and my head and feel dizzy since this morning.\" Feels \"Unbalanced\" Reports blurry vision. Denies any complications with speech.      Patient is a 55 y/o F with h/o schizoaffective disorder, HTN, GERD that presents to the ED with multiple complaints.  SHe states this morning she felt dizzy and has chest pain.  SHe has SOB off and on.  No numbness or weakness.  She is alert and oriented x 3.  She describes the dizziness as the room is spinning.  No nausea or vomiting.  She states she has urinary frequency, but denies dysuria.       History provided by:  Patient  Dizziness  Associated symptoms: chest pain, headaches and shortness of breath    Associated symptoms: no diarrhea, no nausea, no palpitations, no vomiting and no weakness        Prior to Admission Medications   Prescriptions Last Dose Informant Patient Reported? Taking?   Diclofenac Sodium (VOLTAREN) 1 %  Self No No   Sig: Apply 2 g topically 4 (four) times a day as needed (pain)   Incontinence Supply Disposable (Depend Adjustable Underwear) MISC   No No   Sig: Use as needed (as needed) Depends 3XL   acetaminophen (TYLENOL) 325 mg tablet  Self No No   Sig: Take 2 tablets (650 mg total) by mouth every 4 (four) hours as needed for mild pain   ammonium lactate (LAC-HYDRIN) 12 % lotion  Self No No   Sig: Apply topically 2 (two) times a day   Patient not taking: Reported on 3/28/2024   atorvastatin (LIPITOR) 10 mg tablet  Self No No   Sig: Take 1 tablet (10 mg total) by mouth daily at bedtime   benztropine (COGENTIN) 0.5 mg tablet  Self Yes No   bisacodyl (DULCOLAX) 5 mg EC tablet   No No   Sig: Bowel Prep: four (4) 5 mg tablets as directed   clonazePAM (KlonoPIN) 0.5 mg tablet  Self Yes No   Sig: Take 0.5 mg by mouth 2 (two) times a day as needed for anxiety   clonazePAM (KlonoPIN) 1 mg tablet  Self Yes No   Sig: Take 1 mg by mouth 2 (two) times a day "   desvenlafaxine succinate (PRISTIQ) 100 mg 24 hr tablet  Self No No   Sig: Take 1 tablet (100 mg total) by mouth daily   divalproex sodium (DEPAKOTE) 500 mg DR tablet  Self No No   Sig: Take 2 tablets (1,000 mg total) by mouth daily at bedtime   ergocalciferol (VITAMIN D2) 50,000 units  Self No No   Sig: Take 1 capsule (50,000 Units total) by mouth once a week for 11 doses   escitalopram (LEXAPRO) 10 mg tablet  Self Yes No   Sig: Take 10 mg by mouth daily   furosemide (LASIX) 20 mg tablet   No No   Sig: Take 1 tablet (20 mg total) by mouth daily   gabapentin (NEURONTIN) 100 mg capsule  Self No No   Sig: Take 1 capsule (100 mg total) by mouth 3 (three) times a day   ibuprofen (MOTRIN) 600 mg tablet  Self No No   Sig: Take 1 tablet (600 mg total) by mouth every 6 (six) hours as needed for headaches, fever or moderate pain   lidocaine (LIDODERM) 5 %  Self No No   Sig: Apply 1 patch topically over 12 hours daily as needed (Daily PRN) Remove & Discard patch within 12 hours or as directed by MD   lisinopril (ZESTRIL) 20 mg tablet  Self No No   Sig: Take 1 tablet (20 mg total) by mouth daily   loperamide (IMODIUM) 2 mg capsule  Self Yes No   Patient not taking: Reported on 3/28/2024   loperamide (IMODIUM) 2 mg capsule   No No   Sig: Take 1 capsule (2 mg total) by mouth 4 (four) times a day as needed for diarrhea   meclizine (ANTIVERT) 12.5 MG tablet  Self No No   Sig: Take 1 tablet (12.5 mg total) by mouth every 8 (eight) hours as needed for dizziness   melatonin 3 mg  Self No No   Sig: Take 1 tablet (3 mg total) by mouth daily at bedtime   nicotine polacrilex (NICORETTE) 2 mg gum  Self No No   Sig: Chew 1 each (2 mg total) every 2 (two) hours as needed for smoking cessation   ondansetron (ZOFRAN-ODT) 4 mg disintegrating tablet   No No   Sig: Take 1 tablet (4 mg total) by mouth every 6 (six) hours as needed for nausea or vomiting   pantoprazole (PROTONIX) 20 mg tablet  Self No No   Sig: Take 1 tablet (20 mg total) by  "mouth daily   polyethylene glycol (MiraLax) 17 GM/SCOOP powder   No No   Sig: Take 238 g by mouth once for 1 dose Take 238 g my mouth. Use as directed   prazosin (MINIPRESS) 2 mg capsule  Self No No   Sig: Take 1 capsule (2 mg total) by mouth daily at bedtime   risperiDONE (RisperDAL) 3 mg tablet  Self No No   Sig: Take 1 tablet (3 mg total) by mouth 2 (two) times a day   traZODone (DESYREL) 50 mg tablet  Self No No   Sig: Take 1 tablet (50 mg total) by mouth daily at bedtime      Facility-Administered Medications: None       Past Medical History:   Diagnosis Date    Anxiety     Bipolar 1 disorder (HCC)     Bipolar affective disorder, depressed, severe (HCC) 10/10/2021    Borderline personality disorder (HCC)     CAD (coronary artery disease)     Cognitive impairment     Depression     Dyslipidemia     GERD (gastroesophageal reflux disease)     Hypertension     Obesity     Psychiatric disorder     Psychiatric illness     PTSD (post-traumatic stress disorder)     Schizoaffective disorder (HCC)     Seizure (HCC)        Past Surgical History:   Procedure Laterality Date    APPENDECTOMY      PATIENT DENIES HAVING APPENDIX REMOVED    CHOLECYSTECTOMY      FOOT SURGERY Right        Family History   Problem Relation Age of Onset    Kidney cancer Mother     Cerebral aneurysm Father      I have reviewed and agree with the history as documented.    E-Cigarette/Vaping    E-Cigarette Use Never User      E-Cigarette/Vaping Substances    Nicotine No     THC No     CBD No     Flavoring No     Other No     Unknown No      Social History     Tobacco Use    Smoking status: Former     Current packs/day: 0.25     Average packs/day: 0.3 packs/day for 0.2 years (0.1 ttl pk-yrs)     Types: Cigarettes, Cigars     Start date: 2/5/2024    Smokeless tobacco: Never    Tobacco comments:     Pt stated \"smokes when stressed\"   Vaping Use    Vaping status: Never Used   Substance Use Topics    Alcohol use: Not Currently     Comment: occasionally    " Drug use: Not Currently       Review of Systems   Constitutional:  Negative for chills and fever.   HENT: Negative.     Respiratory:  Positive for shortness of breath. Negative for cough.    Cardiovascular:  Positive for chest pain. Negative for palpitations and leg swelling.   Gastrointestinal:  Negative for diarrhea, nausea and vomiting.   Genitourinary:  Positive for frequency. Negative for dysuria.   Skin:  Negative for color change and rash.   Neurological:  Positive for dizziness and headaches. Negative for facial asymmetry, speech difficulty, weakness and numbness.   Psychiatric/Behavioral:  Negative for confusion.    All other systems reviewed and are negative.      Physical Exam  Physical Exam  Vitals and nursing note reviewed.   Constitutional:       General: She is not in acute distress.     Appearance: Normal appearance. She is well-developed and well-groomed. She is obese. She is not ill-appearing or diaphoretic.   HENT:      Head: Normocephalic and atraumatic.      Right Ear: Hearing normal.      Left Ear: Hearing normal.      Nose: Nose normal.      Mouth/Throat:      Mouth: Mucous membranes are moist.      Pharynx: Oropharynx is clear.   Eyes:      Extraocular Movements: Extraocular movements intact.      Conjunctiva/sclera: Conjunctivae normal.      Pupils: Pupils are equal, round, and reactive to light.   Cardiovascular:      Rate and Rhythm: Normal rate and regular rhythm.      Heart sounds: Normal heart sounds.   Pulmonary:      Effort: Pulmonary effort is normal.      Breath sounds: Normal breath sounds. No wheezing, rhonchi or rales.   Abdominal:      General: Abdomen is flat. Bowel sounds are normal.      Palpations: Abdomen is soft.      Tenderness: There is no abdominal tenderness.   Musculoskeletal:         General: Normal range of motion.      Cervical back: Normal range of motion.      Right lower leg: No edema.      Left lower leg: No edema.   Skin:     General: Skin is warm and dry.       Coloration: Skin is not pale.      Findings: No erythema or rash.   Neurological:      Mental Status: She is alert and oriented to person, place, and time.      GCS: GCS eye subscore is 4. GCS verbal subscore is 5. GCS motor subscore is 6.      Cranial Nerves: Cranial nerves 2-12 are intact.      Sensory: Sensation is intact.      Motor: Motor function is intact.      Coordination: Coordination is intact. Finger-Nose-Finger Test normal.      Gait: Gait is intact.   Psychiatric:         Mood and Affect: Affect is flat.         Behavior: Behavior is cooperative.         Vital Signs  ED Triage Vitals [04/20/24 1600]   Temperature Pulse Respirations Blood Pressure SpO2   98.2 °F (36.8 °C) 84 20 106/59 97 %      Temp Source Heart Rate Source Patient Position - Orthostatic VS BP Location FiO2 (%)   Temporal Monitor Sitting Left arm --      Pain Score       10 - Worst Possible Pain           Vitals:    04/20/24 1600 04/20/24 1645 04/20/24 1745   BP: 106/59 119/58 156/71   Pulse: 84 79 83   Patient Position - Orthostatic VS: Sitting Sitting Sitting         Visual Acuity  Visual Acuity      Flowsheet Row Most Recent Value   L Pupil Size (mm) 3   R Pupil Size (mm) 3            ED Medications  Medications - No data to display    Diagnostic Studies  Results Reviewed       Procedure Component Value Units Date/Time    Urine Microscopic [[id number]]  (Abnormal) Collected: 04/20/24 1746    Lab Status: Final result Specimen: Urine, Clean Catch Updated: 04/20/24 1804     RBC, UA None Seen /hpf      WBC, UA 4-10 /hpf      Epithelial Cells Occasional /hpf      Bacteria, UA Occasional /hpf     UA w Reflex to Microscopic w Reflex to Culture [[id number]]  (Abnormal) Collected: 04/20/24 1746    Lab Status: Final result Specimen: Urine, Clean Catch Updated: 04/20/24 1804     Color, UA Yellow     Clarity, UA Clear     Specific Gravity, UA 1.010     pH, UA 6.5     Leukocytes, UA Moderate     Nitrite, UA Negative     Protein, UA Negative mg/dl       Glucose, UA Negative mg/dl      Ketones, UA Negative mg/dl      Urobilinogen, UA 2.0 mg/dl      Bilirubin, UA Negative     Occult Blood, UA Negative    HS Troponin 0hr (reflex protocol) [[id number]]  (Normal) Collected: 04/20/24 1608    Lab Status: Final result Specimen: Blood from Arm, Right Updated: 04/20/24 1635     hs TnI 0hr <2 ng/L     Comprehensive metabolic panel [[id number]]  (Abnormal) Collected: 04/20/24 1608    Lab Status: Final result Specimen: Blood from Arm, Right Updated: 04/20/24 1628     Sodium 137 mmol/L      Potassium 4.0 mmol/L      Chloride 100 mmol/L      CO2 31 mmol/L      ANION GAP 6 mmol/L      BUN 8 mg/dL      Creatinine 0.54 mg/dL      Glucose 114 mg/dL      Calcium 9.1 mg/dL      AST 24 U/L      ALT 41 U/L      Alkaline Phosphatase 59 U/L      Total Protein 7.6 g/dL      Albumin 4.3 g/dL      Total Bilirubin 0.45 mg/dL      eGFR 108 ml/min/1.73sq m     Narrative:      National Kidney Disease Foundation guidelines for Chronic Kidney Disease (CKD):     Stage 1 with normal or high GFR (GFR > 90 mL/min/1.73 square meters)    Stage 2 Mild CKD (GFR = 60-89 mL/min/1.73 square meters)    Stage 3A Moderate CKD (GFR = 45-59 mL/min/1.73 square meters)    Stage 3B Moderate CKD (GFR = 30-44 mL/min/1.73 square meters)    Stage 4 Severe CKD (GFR = 15-29 mL/min/1.73 square meters)    Stage 5 End Stage CKD (GFR <15 mL/min/1.73 square meters)  Note: GFR calculation is accurate only with a steady state creatinine    CBC and differential [[id number]]  (Abnormal) Collected: 04/20/24 1608    Lab Status: Final result Specimen: Blood from Arm, Right Updated: 04/20/24 1613     WBC 7.27 Thousand/uL      RBC 3.70 Million/uL      Hemoglobin 11.6 g/dL      Hematocrit 35.3 %      MCV 95 fL      MCH 31.4 pg      MCHC 32.9 g/dL      RDW 14.3 %      MPV 9.5 fL      Platelets 175 Thousands/uL      nRBC 0 /100 WBCs      Segmented % 60 %      Immature Grans % 2 %      Lymphocytes % 26 %      Monocytes % 11 %       Eosinophils Relative 1 %      Basophils Relative 0 %      Absolute Neutrophils 4.34 Thousands/µL      Absolute Immature Grans 0.17 Thousand/uL      Absolute Lymphocytes 1.87 Thousands/µL      Absolute Monocytes 0.77 Thousand/µL      Eosinophils Absolute 0.09 Thousand/µL      Basophils Absolute 0.03 Thousands/µL                    CT head without contrast   Final Result by Angel Herrmann MD (04/20 1738)      No acute intracranial abnormality.                  Workstation performed: [id number]         XR chest 1 view portable    (Results Pending)              Procedures  ECG 12 Lead Documentation Only    Date/Time: 4/20/2024 4:03 PM    Performed by: Yoanna Lopez PA-C  Authorized by: Yoanna Lopez PA-C    Indications / Diagnosis:  Chest pain  Patient location:  ED  Previous ECG:     Previous ECG:  Compared to current    Similarity:  No change  Rate:     ECG rate:  82  Rhythm:     Rhythm: sinus rhythm    Conduction:     Conduction: normal    ST segments:     ST segments:  Normal  T waves:     T waves: normal             ED Course                               SBIRT 20yo+      Flowsheet Row Most Recent Value   Initial Alcohol Screen: US AUDIT-C     1. How often do you have a drink containing alcohol? 0 Filed at: 04/20/2024 1743   2. How many drinks containing alcohol do you have on a typical day you are drinking?  0 Filed at: 04/20/2024 1743   3a. Male UNDER 65: How often do you have five or more drinks on one occasion? 0 Filed at: 04/20/2024 1743   3b. FEMALE Any Age, or MALE 65+: How often do you have 4 or more drinks on one occassion? 0 Filed at: 04/20/2024 1743   Audit-C Score 0 Filed at: 04/20/2024 1743   DAT: How many times in the past year have you...    Used an illegal drug or used a prescription medication for non-medical reasons? Never Filed at: 04/20/2024 1743                      Medical Decision Making  Patient with chest pain and dizziness, will order labs, UA, EKG, CXR, CT  head to r/o ACS, cardiac arrhythmia, UTI, electrolyte abnormality, anemia, brain tumor.   No acute abnormalities on labs.  Will discharge with return precautions.     Amount and/or Complexity of Data Reviewed  External Data Reviewed: labs, ECG and notes.  Labs: ordered.  Radiology: ordered.  ECG/medicine tests: ordered and independent interpretation performed.             Disposition  Final diagnoses:   Dizziness   Chest pain     Time reflects when diagnosis was documented in both MDM as applicable and the Disposition within this note       Time User Action Codes Description Comment    4/20/2024  5:50 PM Yoanna Lopez Add [R42] Dizziness     4/20/2024  5:50 PM Yoanna Lopez Add [R07.9] Chest pain           ED Disposition       ED Disposition   Discharge    Condition   Stable    Date/Time   Sat Apr 20, 2024 1807    Comment   Leydi Khan discharge to home/self care.                   Follow-up Information       Follow up With Specialties Details Why Contact Info Additional Information    [facility], Nurse Practitioner Schedule an appointment as soon as possible for a visit in 2 days For recheck [address] [place] [id number]  [phone]        [facility] Emergency Department Emergency Medicine Go to  If symptoms worsen [address] [facility] [address] [phone] [facility] Emergency Department, [address], [place] [address]            Patient's Medications   Discharge Prescriptions    No medications on file       No discharge procedures on file.    PDMP Review         Value Time User    PDMP Reviewed  Yes 2/12/2024 11:26 AM SHANI Hawk            ED Provider  Electronically Signed by             Yoanna Lopez PA-C  04/20/24 [id number]

## 2024-04-25 ENCOUNTER — OFFICE VISIT (OUTPATIENT)
Dept: FAMILY MEDICINE CLINIC | Facility: HOSPITAL | Age: 54
End: 2024-04-25
Payer: MEDICARE

## 2024-04-25 VITALS
TEMPERATURE: 97.2 F | DIASTOLIC BLOOD PRESSURE: 84 MMHG | OXYGEN SATURATION: 97 % | HEIGHT: 65 IN | WEIGHT: 289.8 LBS | SYSTOLIC BLOOD PRESSURE: 128 MMHG | HEART RATE: 94 BPM | BODY MASS INDEX: 48.28 KG/M2

## 2024-04-25 DIAGNOSIS — R42 DIZZINESS: Primary | ICD-10-CM

## 2024-04-25 DIAGNOSIS — J30.1 SEASONAL ALLERGIC RHINITIS DUE TO POLLEN: ICD-10-CM

## 2024-04-25 PROCEDURE — G2211 COMPLEX E/M VISIT ADD ON: HCPCS | Performed by: NURSE PRACTITIONER

## 2024-04-25 PROCEDURE — 99214 OFFICE O/P EST MOD 30 MIN: CPT | Performed by: NURSE PRACTITIONER

## 2024-04-25 RX ORDER — LORATADINE 10 MG/1
10 TABLET ORAL DAILY
Qty: 30 TABLET | Refills: 0 | Status: SHIPPED | OUTPATIENT
Start: 2024-04-25

## 2024-04-25 NOTE — PROGRESS NOTES
Assessment/Plan:     She continues with dizziness. Unclear if she is taking meclizine.   Her cluster of symptoms may be secondary to allergies. I will have her start daily loratadine.   If dizziness persists then she may benefit from vestibular therapy.   New Vitae paperwork completed.      Diagnoses and all orders for this visit:    Dizziness    Seasonal allergic rhinitis due to pollen  -     loratadine (CLARITIN) 10 mg tablet; Take 1 tablet (10 mg total) by mouth daily          Subjective:     Patient ID: Leydi Khan is a 54 y.o. female.    In ER 4/20 withy dizziness and chest pain. On and off sob. Labs neg. CT head neg. Neg ECG and HS trop. D/C'd on meclizine. Pt is resident at [facility]. Schizoaffective d/o. Here with [facility] escort.   She still reports feeling dizzy. Has pressure in eyes and behind. Has seasonal allergies. Eyes hurt. No itchy or watery. Does not have glasses. Has trouble reading. Needs large print. No vision loss. Has a HA back of her head. Saw a doctor at [facility] and was prescribed something for the HA. Has had nasal congestion. Mostly runny nose. No fever or chills.         Review of Systems   Constitutional:  Negative for chills and fever.   HENT:  Positive for congestion and rhinorrhea.    Eyes:  Positive for pain. Negative for photophobia, discharge, redness, itching and visual disturbance.   Respiratory:  Negative for shortness of breath.    Cardiovascular:  Negative for chest pain, palpitations and leg swelling.   Neurological:  Positive for dizziness and headaches.         The following portions of the patient's history were reviewed and updated as appropriate: allergies, current medications, past family history, past medical history, past social history, past surgical history and problem list.    Objective:  Vitals:    04/25/24 1002   BP: 128/84   Pulse: 94   Temp: (!) 97.2 °F (36.2 °C)   SpO2: 97%      Physical Exam  Vitals reviewed.   Constitutional:       Appearance: Normal  appearance. She is obese.   HENT:      Right Ear: Tympanic membrane, ear canal and external ear normal.      Left Ear: Tympanic membrane, ear canal and external ear normal.      Mouth/Throat:      Mouth: Mucous membranes are moist.      Pharynx: Oropharynx is clear.   Cardiovascular:      Rate and Rhythm: Normal rate and regular rhythm.      Heart sounds: Normal heart sounds. No murmur heard.  Pulmonary:      Effort: Pulmonary effort is normal.      Breath sounds: Normal breath sounds.   Skin:     General: Skin is warm and dry.   Neurological:      Mental Status: She is alert and oriented to person, place, and time.   Psychiatric:         Mood and Affect: Mood normal.         Behavior: Behavior normal.         Thought Content: Thought content normal.         Judgment: Judgment normal.

## 2024-04-26 ENCOUNTER — TELEPHONE (OUTPATIENT)
Age: 54
End: 2024-04-26

## 2024-04-26 NOTE — TELEPHONE ENCOUNTER
PA for loratidine Approved     Date(s) approved 4/26/24-12/31/24    Case #[id number]    Patient advised by          [x] [employer] Touch EMRhart Message  [] Phone call   []LMOM  []L/M to call office as no active Communication consent on file  []Unable to leave detailed message as VM not approved on Communication consent         Pharmacy advised by    [x]Fax  []Phone call    Approval letter scanned into Media Yes

## 2024-04-26 NOTE — TELEPHONE ENCOUNTER
PA for loratadine (CLARITIN)     Submitted via    [x]CMM-KEY [id number]  [][employer]-Case ID #    []Faxed to plan   []Other website    []Phone call Case ID #      Office notes sent, clinical questions answered. Awaiting determination    Turnaround time for your insurance to make a decision on your Prior Authorization can take 7-21 business days.

## 2024-04-28 ENCOUNTER — HOSPITAL ENCOUNTER (EMERGENCY)
Facility: HOSPITAL | Age: 54
Discharge: HOME | End: 2024-04-28
Attending: EMERGENCY MEDICINE
Payer: MEDICARE

## 2024-04-28 VITALS
TEMPERATURE: 97.9 F | RESPIRATION RATE: 18 BRPM | SYSTOLIC BLOOD PRESSURE: 144 MMHG | HEART RATE: 99 BPM | DIASTOLIC BLOOD PRESSURE: 77 MMHG | OXYGEN SATURATION: 94 %

## 2024-04-28 DIAGNOSIS — R45.851 SUICIDAL IDEATION: ICD-10-CM

## 2024-04-28 DIAGNOSIS — F32.A DEPRESSION: Primary | ICD-10-CM

## 2024-04-28 LAB
ATRIAL RATE: 100 BPM
P AXIS: 46 DEGREES
PR INTERVAL: 174 MS
QRS AXIS: 23 DEGREES
QRSD INTERVAL: 82 MS
QT INTERVAL: 322 MS
QTC INTERVAL: 415 MS
T WAVE AXIS: 34 DEGREES
VENTRICULAR RATE: 100 BPM

## 2024-04-28 PROCEDURE — 99285 EMERGENCY DEPT VISIT HI MDM: CPT | Performed by: EMERGENCY MEDICINE

## 2024-04-28 PROCEDURE — 99285 EMERGENCY DEPT VISIT HI MDM: CPT

## 2024-04-28 PROCEDURE — 93010 ELECTROCARDIOGRAM REPORT: CPT | Performed by: INTERNAL MEDICINE

## 2024-04-28 PROCEDURE — 93005 ELECTROCARDIOGRAM TRACING: CPT

## 2024-04-28 NOTE — ED NOTES
Pt is a 54 y.o. female who was brought to the ED with   Chief Complaint   Patient presents with    Psychiatric Evaluation     Pt has SI with plan. Pt want to slit her throat. Pt wrote on her left arm with a pen stating suicidal kill Leydi.    CIS received a call from Kofi ([facility]) who informed [facility] that patient was on her way in to be seen for Crisis eval, although Kofi reports that patient wants to be admitted due to having a argument with her boyfriend and her roommate, and staff tried to talk her out of coming to the ED.   CIS met with patient and discussed what happened today patient reports that she is always talking about being suicidal (her baseline) and that today she was mad at her boyfriend and her roommate.  Based upon the patients BH high utilizerr careplan and in consultation with ED Physician it is recommended that patient be discharged back to her facility, where she can continue to develop the coping skills.  CIS discussed this case with ED Physician who is in agreement that patient can be discharged back to her facility. CIS contacted Kofi ([facility]) and informed her that patient will be discharged, kofi informed CIS that staff will be there shortly          Summary: Behavioral Health High Utilizer Careplan     Patient Name:Leydi Khan MRN:  [id number]         : 1970     Age: 54 y.o.    Sex: female   Utilization History:  (# of ED visits & IP admits; reasons)  Pt had 19 [facility]-ED visits from 2023-2024. ED presentations were related to SI's with no plan or a plan to cut her wrists or throat, jump or walk in front of a car or traffic, hang herself or drink bleach.  Presentations were also related to AH, feelings that no one loves or cares about her, boyfriend break-ups, self-injurious superficial scratches or scrapes, banging head and calling 911 on herself from her group home setting.     Medically, pt has reported back pain, chest pain, pain from falling, generalized  body pain, UTI symptoms.     Pt had multiple 30-day behavioral health readmissions.       Treatment Recommendations & Presentation:  Presentation in the ED: Pt will typically call 911 for EMS/police to bring her to ED's or is accompanied by her group home staff. During ED visits, pt may state that no one cares about her or loves her or that she just had a fight with her boyfriend and that he doesn't love her or they broke up. SI's with no plan or SI's with a plan to cut her wrists or throat, jump or walk in front of a car or traffic, hang herself, bang head, drink bleach or kill herself with a thumbtack. Pt also reports Auditory Hallucinations.   Pt has superficially scratched or poked herself with a thumbtack on multiple occasions and has also utilized a plastic fork to scratch herself. Pt also states she does not like living at her group home and [facility]. Medically, pt has reported back pain, chest pain, pain from falling, flank & body pain, UTI symptoms.  Pt will call 911 on herself in order for EMS to bring her to ED's and tends to do this at certain times when group home staffing is more limited. If pt's boyfriend is being hospitalized then pt also wants hospitalization. Pt may seek hospitalization because she spent her allowance and is frustrated.        ED Recommendations: Following medical evaluation and treatment, allow pt time for de-escalation and for provider to establish acute risk versus pt's chronic behavioral disturbances.  Contact [facility] to develop a [facility] plan [facility] [phone]. Specific contacts are: Letitia Lucia - Supervisor, Care Coordinator [phone]. [phone] or Fabio - Care Coordinator [phone] or Supervisor BRITT Betts - [phone] ext. [phone], or Therapist aDna putnam [place] at [phone] ext [phone].      Home Medication Regimen: See most recent documentation in Epic, can verify medication with  staff or nurse from [facility] [phone].           Recent Medical Work Ups:  (include Psych testing or ECT)     EKG's - 2023     Labs in ED's - 2023 Inpatient Recommendations: Collaborate with pt's community sources to develop a comprehensive discharge plan.           Outpatient recommendations: Pt is to continue with treatment at [facility]. [facility] group home staff, care coordinators, and therapist should develop a crisis plan with pt and healthy coping skills in response to her distressful feelings.           Situation/Relevant Background Info:  Pt is a 54 year old female with Cognitive Impairment, Bipolar I Disorder, and Borderline Personality Disorder along with an extensive history of behavioral health inpatient admissions.  Pt resides in a group home setting at [facility]. Pt also has Care Coordinators and receives therapy and psychiatric care through the [facility].   Pt appears to derive secondary gains from ED visits/encounters as well inpatient behavioral health (BH) admissions; therefore, group home staff has advised hospital professionals that pt should have brief behavioral health stays/hospitalizations.   Pt states that she has a boyfriend who is a support system for her; staff confirms that pt has a boyfriend who also lives at [facility] and she will often mirror his issues - pt attempts to be hospitalized when her boyfriend is being hospitalized. Pt's romantic interests may change to other individuals at her group home.  Upon  admission, Pt often states that she does not want to be in her group home and does not want to be discharged back to her group home. Pt has a possibility of another living situation as per TIP coordinator but pt has to be stable in the community.  Pt was moved from [facility] to [facility] in 2023 and is still getting used to her new staff and housing  situation. Therapist aDna states pt's most recent  admit 11/2023 was due to pt spending her allowance on take out food and then being very upset that money was gone. They intend to work on her budgeting issues.                Diagnoses/Significant Problems (Medical & Psychiatric):     Psychiatric:  Bipolar 1 Disorder, MDD, PTSD, Borderline Personality Disorder     Medical: Hyponatremia (HCC), Seizures (HCC), Obesity, Hyperlipidemia, HTN, Cognitive Impairment                  Drivers of repeated utilization:  Secondary gains from ED visits and inpatient  stays, impulsivity, limited coping skills, attention seeking behaviors, wanting to be in hospital if her boyfriend is hospitalized.                                                    Existing Community Resources & Supports:                    Little to no family involvement.   Pt will state she has a boyfriend             Patient Medical & Psychiatric Care Team:  [facility] [phone]  [facility] [phone]  Therapist Dana - [phone]  Letitia Lucia - supervisor care coordinator [phone]. [phone]  Fabio - care coordinator [phone]  Ivan Betts - [phone] ext. [phone], supervisor                                  Oliver Lovelace  Crisis Intervention Specialist II

## 2024-05-01 ENCOUNTER — TELEPHONE (OUTPATIENT)
Age: 54
End: 2024-05-01

## 2024-05-01 NOTE — TELEPHONE ENCOUNTER
Anastasia from [facility] called because patient was given Vitamin D3 when she was discharged from the hospital on 04/28/24 and she would like to Know if patient's PCP still wants her to take Vitamin D. Please advise. Call back # [phone] ext [phone]

## 2024-05-06 ENCOUNTER — DOCUMENTATION (OUTPATIENT)
Dept: CASE MANAGEMENT | Facility: HOSPITAL | Age: 54
End: 2024-05-06

## 2024-05-06 NOTE — BEHAVIORAL HEALTH HIGH UTILIZER
Patient Name:Leydi Khan MRN:  [id number]         : 1970     Age: 54 y.o.    Sex: female   Utilization History:  (# of ED visits & IP admits; reasons)  Pt had 20 [facility]-ED visits from 2023-2024. ED presentations were related to SI's with no plan or a plan to cut her wrists or throat, jump or walk in front of a car or traffic, hang herself, bang her head or drink bleach.  Presentations were also related to AH, feelings that no one loves or cares about her, boyfriend break-ups, complaints about her group home and needs not being met, self-injurious superficial scratches or scrapes with pens/forks/paperclips/thumbtack, banging head and calling 911 on herself from her group home setting.     Medical presentations were related to back pain, chest pain, pain from falling, generalized body pain, UTI symptoms, bilateral leg swelling, dizziness.     Pt had multiple 30-day behavioral health readmissions.       Treatment Recommendations & Presentation:  Presentation in the ED: Pt will typically call 911 for EMS/police to bring her to ED's or is accompanied by group home staff. ED visits were related to SI's with no plan or a plan to cut her wrists or throat, jump or walk in front of a car or traffic, hang herself, bang her head or drink bleach. s were also related to AH, feelings that no one loves or cares about her, boyfriend break-ups, complaints about her group home and needs not being met, self-injurious superficial scratches or scrapes with pens/forks/paperclips, banging head and calling 911 on herself from her group home setting.  Medical visits were related to back pain, chest pain, pain from falling, generalized body pain, UTI symptoms, bilateral leg swelling, dizziness.  Pt has superficially scratched or poked herself with a thumbtack on multiple occasions and has also utilized a plastic fork to scratch herself. Pt also states she does not like living at her group home and does not want to be discharged from  inpt BH stays.   Pt will call 911 on herself in order for EMS to bring her to ED's and tends to do this at certain times when group home staffing is more limited. If pt's boyfriend is being hospitalized then pt also wants hospitalization. Pt may seek hospitalization because she spent her allowance and is frustrated.          ED Recommendations: Following medical evaluation and treatment, allow pt time for de-escalation and for provider to establish acute risk versus pt's chronic behavioral disturbances.  Contact [facility] to develop a safe discharge plan back to [facility] [phone]. Specific contacts are: Letitia Lucia - Supervisor, Care Coordinator [phone]. [phone] or Fabio - Care Coordinator [phone] or Supervisor BRITT Betts - [phone] ext. [phone], or Therapist Dana from [facility] at [phone].        Home Medication Regimen: See most recent documentation in Epic, can verify medication with staff or nurse from [facility] [phone].          Recent Medical Work Ups:  (include Psych testing or ECT)     EKG's - 2023   Labs - 2023, 2024 Inpatient Recommendations:  Collaborate with pt's community sources to develop a comprehensive discharge plan.           Outpatient recommendations:  Pt is to continue with treatment at [facility]. [facility] staff, care coordinators, and therapist should develop a crisis plan with pt and healthy coping skills in response to her distressful feelings.             Situation/Relevant Background Info: Pt is a 54 year old female with Cognitive Impairment, Bipolar I Disorder, and Borderline Personality Disorder along with an extensive history of behavioral health inpatient admissions.  Pt resides in a group home setting at [facility]. Pt also has Care Coordinators and receives therapy and psychiatric care through the [facility].   Pt appears  to derive secondary gains from ED visits/encounters as well inpatient behavioral health (BH) admissions; therefore, group home staff has advised hospital professionals that pt should have brief behavioral health stays/hospitalizations.   Pt states that she has a boyfriend who is a support system for her; staff confirms that pt has a boyfriend who also lives at [facility] and she will often mirror his issues - pt attempts to be hospitalized when her boyfriend is being hospitalized. Pt's romantic interests may change to other individuals at her group home.  Upon BH admission, Pt often states that she does not want to be in her group home and does not want to be discharged back to her group home. Pt has a possibility of another living situation as per TIP coordinator but pt has to be stable in the community.  Pt was moved from [facility] to [facility] in 2023 and is still getting used to her new staff and housing situation. Therapist Dana states pt's most recent  admit 11/2023 was due to pt spending her allowance on take out food and then being very upset that money was gone. They intend to work on her budgeting issues.                  Diagnoses/Significant Problems (Medical & Psychiatric):    Psychiatric:  Bipolar 1 Disorder, MDD, PTSD, Borderline Personality Disorder     Medical: Hyponatremia (HCC), Seizures (HCC), Obesity, Hyperlipidemia, HTN, Cognitive Impairment               Drivers of repeated utilization:  Secondary gains from ED visits and inpatient BH stays, impulsivity, limited coping skills, attention seeking behaviors, wanting to be in hospital if her boyfriend is hospitalized, personality disordered behaviors, unhappy with group home.                                               Existing Community Resources & Supports:                 Little to no family involvement.   Pt will state she has a boyfriend         Patient Medical & Psychiatric Care Team:  [facility] [phone]  Nu [place] Villaltatowalexander Zimmerman [phone]  Therapist Dana - [phone]  Letitia Lucia - supervisor care coordinator [phone]. [phone]  Fabio - care coordinator [phone]  Ivan Betts - [phone] ext. [phone], supervisor     Care plan date: 05/06/24                   Author:  Brianna Coleman RN                 Date reviewed with patient:

## 2024-05-07 DIAGNOSIS — E55.9 VITAMIN D DEFICIENCY: Primary | ICD-10-CM

## 2024-05-07 NOTE — TELEPHONE ENCOUNTER
Per [facility], there is no need for 5/9 appt if Rx is sent for D3. 5/14 appt for f/u will be kept

## 2024-05-07 NOTE — TELEPHONE ENCOUNTER
Please fax Rx to Anastasia at [facility] for D3 [id number] daily, they are not able to give her this without an Rx. TY  Fax [phone]

## 2024-05-08 ENCOUNTER — HOSPITAL ENCOUNTER (OUTPATIENT)
Dept: NON INVASIVE DIAGNOSTICS | Facility: HOSPITAL | Age: 54
Discharge: HOME | End: 2024-05-08
Payer: MEDICARE

## 2024-05-08 DIAGNOSIS — R60.0 BILATERAL LEG EDEMA: ICD-10-CM

## 2024-05-08 PROCEDURE — 93970 EXTREMITY STUDY: CPT | Performed by: SURGERY

## 2024-05-08 PROCEDURE — 93970 EXTREMITY STUDY: CPT

## 2024-06-12 ENCOUNTER — HOSPITAL ENCOUNTER (EMERGENCY)
Facility: HOSPITAL | Age: 54
Discharge: HOME | End: 2024-06-12
Attending: EMERGENCY MEDICINE | Admitting: EMERGENCY MEDICINE
Payer: MEDICARE

## 2024-06-12 ENCOUNTER — APPOINTMENT (EMERGENCY)
Dept: CT IMAGING | Facility: HOSPITAL | Age: 54
End: 2024-06-12
Payer: MEDICARE

## 2024-06-12 VITALS
DIASTOLIC BLOOD PRESSURE: 61 MMHG | SYSTOLIC BLOOD PRESSURE: 127 MMHG | TEMPERATURE: 97.9 F | RESPIRATION RATE: 20 BRPM | OXYGEN SATURATION: 95 % | HEART RATE: 89 BPM

## 2024-06-12 DIAGNOSIS — K59.00 CONSTIPATION: ICD-10-CM

## 2024-06-12 DIAGNOSIS — R10.32 LEFT LOWER QUADRANT ABDOMINAL PAIN: Primary | ICD-10-CM

## 2024-06-12 LAB
ALBUMIN SERPL BCP-MCNC: 4.1 G/DL (ref 3.5–5)
ALP SERPL-CCNC: 64 U/L (ref 34–104)
ALT SERPL W P-5'-P-CCNC: 18 U/L (ref 7–52)
ANION GAP SERPL CALCULATED.3IONS-SCNC: 8 MMOL/L (ref 4–13)
ANISOCYTOSIS BLD QL SMEAR: PRESENT
AST SERPL W P-5'-P-CCNC: 11 U/L (ref 13–39)
BACTERIA UR QL AUTO: NORMAL /HPF
BASOPHILS # BLD MANUAL: 0 THOUSAND/UL (ref 0–0.1)
BASOPHILS NFR MAR MANUAL: 0 % (ref 0–1)
BILIRUB SERPL-MCNC: 0.46 MG/DL (ref 0.2–1)
BILIRUB UR QL STRIP: NEGATIVE
BUN SERPL-MCNC: 14 MG/DL (ref 5–25)
CALCIUM SERPL-MCNC: 9.2 MG/DL (ref 8.4–10.2)
CHLORIDE SERPL-SCNC: 96 MMOL/L (ref 96–108)
CLARITY UR: CLEAR
CO2 SERPL-SCNC: 30 MMOL/L (ref 21–32)
COLOR UR: ABNORMAL
CREAT SERPL-MCNC: 0.52 MG/DL (ref 0.6–1.3)
EOSINOPHIL # BLD MANUAL: 0.28 THOUSAND/UL (ref 0–0.4)
EOSINOPHIL NFR BLD MANUAL: 4 % (ref 0–6)
ERYTHROCYTE [DISTWIDTH] IN BLOOD BY AUTOMATED COUNT: 14.2 % (ref 11.6–15.1)
GFR SERPL CREATININE-BSD FRML MDRD: 109 ML/MIN/1.73SQ M
GLUCOSE SERPL-MCNC: 208 MG/DL (ref 65–140)
GLUCOSE UR STRIP-MCNC: NEGATIVE MG/DL
HCT VFR BLD AUTO: 37.6 % (ref 34.8–46.1)
HGB BLD-MCNC: 12.2 G/DL (ref 11.5–15.4)
HGB UR QL STRIP.AUTO: NEGATIVE
KETONES UR STRIP-MCNC: ABNORMAL MG/DL
LEUKOCYTE ESTERASE UR QL STRIP: ABNORMAL
LIPASE SERPL-CCNC: 13 U/L (ref 11–82)
LYMPHOCYTES # BLD AUTO: 1.61 THOUSAND/UL (ref 0.6–4.47)
LYMPHOCYTES # BLD AUTO: 23 % (ref 14–44)
MCH RBC QN AUTO: 31.5 PG (ref 26.8–34.3)
MCHC RBC AUTO-ENTMCNC: 32.4 G/DL (ref 31.4–37.4)
MCV RBC AUTO: 97 FL (ref 82–98)
METAMYELOCYTE ABSOLUTE CT: 0.07 THOUSAND/UL (ref 0–0.1)
METAMYELOCYTES NFR BLD MANUAL: 1 % (ref 0–1)
MONOCYTES # BLD AUTO: 0.35 THOUSAND/UL (ref 0–1.22)
MONOCYTES NFR BLD: 5 % (ref 4–12)
NEUTROPHILS # BLD MANUAL: 4.7 THOUSAND/UL (ref 1.85–7.62)
NEUTS BAND NFR BLD MANUAL: 4 % (ref 0–8)
NEUTS SEG NFR BLD AUTO: 63 % (ref 43–75)
NITRITE UR QL STRIP: NEGATIVE
NON-SQ EPI CELLS URNS QL MICRO: NORMAL /HPF
PH UR STRIP.AUTO: 5.5 [PH]
PLATELET # BLD AUTO: 167 THOUSANDS/UL (ref 149–390)
PLATELET BLD QL SMEAR: ADEQUATE
PMV BLD AUTO: 9.7 FL (ref 8.9–12.7)
POTASSIUM SERPL-SCNC: 4.1 MMOL/L (ref 3.5–5.3)
PROT SERPL-MCNC: 7.5 G/DL (ref 6.4–8.4)
PROT UR STRIP-MCNC: NEGATIVE MG/DL
RBC # BLD AUTO: 3.87 MILLION/UL (ref 3.81–5.12)
RBC #/AREA URNS AUTO: NORMAL /HPF
RBC MORPH BLD: PRESENT
SODIUM SERPL-SCNC: 134 MMOL/L (ref 135–147)
SP GR UR STRIP.AUTO: 1.02 (ref 1–1.03)
UROBILINOGEN UR STRIP-ACNC: <2 MG/DL
WBC # BLD AUTO: 7.01 THOUSAND/UL (ref 4.31–10.16)
WBC #/AREA URNS AUTO: NORMAL /HPF

## 2024-06-12 PROCEDURE — 36415 COLL VENOUS BLD VENIPUNCTURE: CPT | Performed by: PHYSICIAN ASSISTANT

## 2024-06-12 PROCEDURE — 81001 URINALYSIS AUTO W/SCOPE: CPT | Performed by: PHYSICIAN ASSISTANT

## 2024-06-12 PROCEDURE — 96361 HYDRATE IV INFUSION ADD-ON: CPT

## 2024-06-12 PROCEDURE — 83690 ASSAY OF LIPASE: CPT | Performed by: PHYSICIAN ASSISTANT

## 2024-06-12 PROCEDURE — 80053 COMPREHEN METABOLIC PANEL: CPT | Performed by: PHYSICIAN ASSISTANT

## 2024-06-12 PROCEDURE — 74177 CT ABD & PELVIS W/CONTRAST: CPT

## 2024-06-12 PROCEDURE — 96374 THER/PROPH/DIAG INJ IV PUSH: CPT

## 2024-06-12 PROCEDURE — 85007 BL SMEAR W/DIFF WBC COUNT: CPT | Performed by: PHYSICIAN ASSISTANT

## 2024-06-12 PROCEDURE — 99285 EMERGENCY DEPT VISIT HI MDM: CPT | Performed by: PHYSICIAN ASSISTANT

## 2024-06-12 PROCEDURE — 99284 EMERGENCY DEPT VISIT MOD MDM: CPT

## 2024-06-12 PROCEDURE — 85027 COMPLETE CBC AUTOMATED: CPT | Performed by: PHYSICIAN ASSISTANT

## 2024-06-12 RX ORDER — KETOROLAC TROMETHAMINE 30 MG/ML
30 INJECTION, SOLUTION INTRAMUSCULAR; INTRAVENOUS ONCE
Status: COMPLETED | OUTPATIENT
Start: 2024-06-12 | End: 2024-06-12

## 2024-06-12 RX ADMIN — IOHEXOL 100 ML: 350 INJECTION, SOLUTION INTRAVENOUS at 10:35

## 2024-06-12 RX ADMIN — KETOROLAC TROMETHAMINE 30 MG: 30 INJECTION, SOLUTION INTRAMUSCULAR; INTRAVENOUS at 09:34

## 2024-06-12 RX ADMIN — SODIUM CHLORIDE 500 ML: 0.9 INJECTION, SOLUTION INTRAVENOUS at 09:22

## 2024-06-12 NOTE — ED NOTES
Pt reports severe pain, but upon arrival to room, pt resting comfortably with eyes closed. Toradol IV administered per order     Farzana Hernandez RN  06/12/24 [id number]

## 2024-06-12 NOTE — ED PROVIDER NOTES
History  Chief Complaint   Patient presents with    Abdominal Pain     Pt reports left lower abdominal pain that started last night. Reports that it hurts to move. Denies nausea or vomiting.      Patient is a 53 yo wf with from [facility]. History of dyslipidemia, GERD, HTN, obesity, seizure disorder who reports onset 7 pm last night of L lower abdominal pain. States pain is constant. No fever, chills, nausea, vomiting, diarrhea. Last bm last night was formed. No melena or hematochezia. No urinary symptoms. No back pain or flank pain.         Prior to Admission Medications   Prescriptions Last Dose Informant Patient Reported? Taking?   Cholecalciferol 100 MCG (4000 UT) CAPS   No No   Sig: Take 1 capsule (4,000 Units total) by mouth daily   Diclofenac Sodium (VOLTAREN) 1 %  Self No No   Sig: Apply 2 g topically 4 (four) times a day as needed (pain)   Incontinence Supply Disposable (Depend Adjustable Underwear) MISC   No No   Sig: Use as needed (as needed) Depends 3XL   acetaminophen (TYLENOL) 325 mg tablet  Self No No   Sig: Take 2 tablets (650 mg total) by mouth every 4 (four) hours as needed for mild pain   ammonium lactate (LAC-HYDRIN) 12 % lotion  Self No No   Sig: Apply topically 2 (two) times a day   Patient not taking: Reported on 3/28/2024   atorvastatin (LIPITOR) 10 mg tablet  Self No No   Sig: Take 1 tablet (10 mg total) by mouth daily at bedtime   benztropine (COGENTIN) 0.5 mg tablet  Self Yes No   bisacodyl (DULCOLAX) 5 mg EC tablet   No No   Sig: Bowel Prep: four (4) 5 mg tablets as directed   clonazePAM (KlonoPIN) 0.5 mg tablet  Self Yes No   Sig: Take 0.5 mg by mouth 2 (two) times a day as needed for anxiety   clonazePAM (KlonoPIN) 1 mg tablet  Self Yes No   Sig: Take 1 mg by mouth 2 (two) times a day   desvenlafaxine succinate (PRISTIQ) 100 mg 24 hr tablet  Self No No   Sig: Take 1 tablet (100 mg total) by mouth daily   divalproex sodium (DEPAKOTE) 500 mg DR tablet  Self No No   Sig:  Take 2 tablets (1,000 mg total) by mouth daily at bedtime   ergocalciferol (VITAMIN D2) 50,000 units  Self No No   Sig: Take 1 capsule (50,000 Units total) by mouth once a week for 11 doses   escitalopram (LEXAPRO) 10 mg tablet  Self Yes No   Sig: Take 10 mg by mouth daily   furosemide (LASIX) 20 mg tablet   No No   Sig: Take 1 tablet (20 mg total) by mouth daily   gabapentin (NEURONTIN) 100 mg capsule  Self No No   Sig: Take 1 capsule (100 mg total) by mouth 3 (three) times a day   ibuprofen (MOTRIN) 600 mg tablet  Self No No   Sig: Take 1 tablet (600 mg total) by mouth every 6 (six) hours as needed for headaches, fever or moderate pain   lidocaine (LIDODERM) 5 %  Self No No   Sig: Apply 1 patch topically over 12 hours daily as needed (Daily PRN) Remove & Discard patch within 12 hours or as directed by MD   lisinopril (ZESTRIL) 20 mg tablet  Self No No   Sig: Take 1 tablet (20 mg total) by mouth daily   loperamide (IMODIUM) 2 mg capsule  Self Yes No   Patient not taking: Reported on 3/28/2024   loperamide (IMODIUM) 2 mg capsule   No No   Sig: Take 1 capsule (2 mg total) by mouth 4 (four) times a day as needed for diarrhea   loratadine (CLARITIN) 10 mg tablet   No No   Sig: Take 1 tablet (10 mg total) by mouth daily   meclizine (ANTIVERT) 12.5 MG tablet  Self No No   Sig: Take 1 tablet (12.5 mg total) by mouth every 8 (eight) hours as needed for dizziness   melatonin 3 mg  Self No No   Sig: Take 1 tablet (3 mg total) by mouth daily at bedtime   nicotine polacrilex (NICORETTE) 2 mg gum  Self No No   Sig: Chew 1 each (2 mg total) every 2 (two) hours as needed for smoking cessation   ondansetron (ZOFRAN-ODT) 4 mg disintegrating tablet   No No   Sig: Take 1 tablet (4 mg total) by mouth every 6 (six) hours as needed for nausea or vomiting   pantoprazole (PROTONIX) 20 mg tablet  Self No No   Sig: Take 1 tablet (20 mg total) by mouth daily   polyethylene glycol (MiraLax) 17 GM/SCOOP powder   No No   Sig: Take 238 g by  "mouth once for 1 dose Take 238 g my mouth. Use as directed   prazosin (MINIPRESS) 2 mg capsule  Self No No   Sig: Take 1 capsule (2 mg total) by mouth daily at bedtime   risperiDONE (RisperDAL) 3 mg tablet  Self No No   Sig: Take 1 tablet (3 mg total) by mouth 2 (two) times a day   traZODone (DESYREL) 50 mg tablet  Self No No   Sig: Take 1 tablet (50 mg total) by mouth daily at bedtime      Facility-Administered Medications: None       Past Medical History:   Diagnosis Date    Anxiety     Bipolar 1 disorder ([facility])     Bipolar affective disorder, depressed, severe ([facility]) 10/10/2021    Borderline personality disorder ([facility])     CAD (coronary artery disease)     Cognitive impairment     Depression     Dyslipidemia     GERD (gastroesophageal reflux disease)     Hypertension     Obesity     Psychiatric disorder     Psychiatric illness     PTSD (post-traumatic stress disorder)     Schizoaffective disorder ([facility])     Seizure ([facility])        Past Surgical History:   Procedure Laterality Date    APPENDECTOMY      PATIENT DENIES HAVING APPENDIX REMOVED    CHOLECYSTECTOMY      FOOT SURGERY Right        Family History   Problem Relation Age of Onset    Kidney cancer Mother     Cerebral aneurysm Father      I have reviewed and agree with the history as documented.    E-Cigarette/Vaping    E-Cigarette Use Never User      E-Cigarette/Vaping Substances    Nicotine No     THC No     CBD No     Flavoring No     Other No     Unknown No      Social History     Tobacco Use    Smoking status: Former     Current packs/day: 0.25     Average packs/day: 0.3 packs/day for 0.3 years (0.1 ttl pk-yrs)     Types: Cigarettes, Cigars     Start date: 2/5/2024    Smokeless tobacco: Never    Tobacco comments:     Pt stated \"smokes when stressed\"   Vaping Use    Vaping status: Never Used   Substance Use Topics    Alcohol use: Not Currently     Comment: occasionally    Drug use: Not Currently       Review of Systems   Constitutional:  Negative for chills and " fever.   Respiratory:  Negative for shortness of breath.    Cardiovascular:  Negative for chest pain.   Gastrointestinal:  Positive for abdominal pain. Negative for constipation, diarrhea, nausea and vomiting.   Genitourinary:  Negative for dysuria, flank pain and hematuria.   Musculoskeletal:  Negative for back pain.       Physical Exam  Physical Exam  Vitals and nursing note reviewed.   Constitutional:       General: She is not in acute distress.     Appearance: She is obese. She is not ill-appearing, toxic-appearing or diaphoretic.   HENT:      Head: Normocephalic and atraumatic.      Mouth/Throat:      Mouth: Mucous membranes are moist.      Pharynx: Oropharynx is clear.   Eyes:      Extraocular Movements: Extraocular movements intact.      Pupils: Pupils are equal, round, and reactive to light.   Cardiovascular:      Rate and Rhythm: Normal rate and regular rhythm.      Heart sounds: Normal heart sounds.   Pulmonary:      Effort: Pulmonary effort is normal.      Breath sounds: Normal breath sounds.   Abdominal:      General: Bowel sounds are normal.      Palpations: Abdomen is soft.      Tenderness: There is no right CVA tenderness, left CVA tenderness, guarding or rebound. Negative signs include Flower's sign, Rovsing's sign, McBurney's sign and psoas sign.      Comments: Tenderness L mid lateral to lower abdomen    Skin:     General: Skin is warm and dry.   Neurological:      Mental Status: She is alert.         Vital Signs  ED Triage Vitals   Temperature Pulse Respirations Blood Pressure SpO2   06/12/24 0905 06/12/24 0906 06/12/24 0906 06/12/24 0906 06/12/24 0906   97.9 °F (36.6 °C) 89 20 127/61 95 %      Temp Source Heart Rate Source Patient Position - Orthostatic VS BP Location FiO2 (%)   06/12/24 0905 06/12/24 0906 06/12/24 0906 06/12/24 0906 --   Temporal Monitor Lying Left arm       Pain Score       06/12/24 0907       9           Vitals:    06/12/24 0906   BP: 127/61   Pulse: 89   Patient Position -  Orthostatic VS: Lying         Visual Acuity      ED Medications  Medications   sodium chloride 0.9 % bolus 500 mL (0 mL Intravenous Stopped 6/12/24 1038)   ketorolac (TORADOL) injection 30 mg (30 mg Intravenous Given 6/12/24 0934)   iohexol (OMNIPAQUE) 350 MG/ML injection (MULTI-DOSE) 100 mL (100 mL Intravenous Given 6/12/24 1035)       Diagnostic Studies  Results Reviewed       Procedure Component Value Units Date/Time    RBC Morphology Reflex Test [[id number]] Collected: 06/12/24 0921    Lab Status: Final result Specimen: Blood from Line Updated: 06/12/24 1101    Urine Microscopic [[id number]]  (Normal) Collected: 06/12/24 1011    Lab Status: Final result Specimen: Urine, Clean Catch Updated: 06/12/24 1041     RBC, UA 0-1 /hpf      WBC, UA 0-1 /hpf      Epithelial Cells None Seen /hpf      Bacteria, UA Occasional /hpf     UA w Reflex to Microscopic w Reflex to Culture [[id number]]  (Abnormal) Collected: 06/12/24 1011    Lab Status: Final result Specimen: Urine, Clean Catch Updated: 06/12/24 1022     Color, UA Light Yellow     Clarity, UA Clear     Specific Gravity, UA 1.025     pH, UA 5.5     Leukocytes, UA Small     Nitrite, UA Negative     Protein, UA Negative mg/dl      Glucose, UA Negative mg/dl      Ketones, UA Trace mg/dl      Urobilinogen, UA <2.0 mg/dl      Bilirubin, UA Negative     Occult Blood, UA Negative    CBC and differential [[id number]]  (Normal) Collected: 06/12/24 0921    Lab Status: Final result Specimen: Blood from Line Updated: 06/12/24 1018     WBC 7.01 Thousand/uL      RBC 3.87 Million/uL      Hemoglobin 12.2 g/dL      Hematocrit 37.6 %      MCV 97 fL      MCH 31.5 pg      MCHC 32.4 g/dL      RDW 14.2 %      MPV 9.7 fL      Platelets 167 Thousands/uL     Narrative:      This is an appended report.  These results have been appended to a previously verified report.    Manual Differential(PHLEBS Do Not Order) [[id number]] Collected: 06/12/24 0921    Lab Status: Final result Specimen: Blood from  Line Updated: 06/12/24 1018     Segmented % 63 %      Bands % 4 %      Lymphocytes % 23 %      Monocytes % 5 %      Eosinophils % 4 %      Basophils % 0 %      Metamyelocytes % 1 %      Absolute Neutrophils 4.70 Thousand/uL      Absolute Lymphocytes 1.61 Thousand/uL      Absolute Monocytes 0.35 Thousand/uL      Absolute Eosinophils 0.28 Thousand/uL      Absolute Basophils 0.00 Thousand/uL      Absolute Metamyelocytes 0.07 Thousand/uL      Total Counted --     RBC Morphology Present     Platelet Estimate Adequate     Anisocytosis Present    Lipase [[id number]]  (Normal) Collected: 06/12/24 0921    Lab Status: Final result Specimen: Blood from Line Updated: 06/12/24 0947     Lipase 13 u/L     Comprehensive metabolic panel [[id number]]  (Abnormal) Collected: 06/12/24 0921    Lab Status: Final result Specimen: Blood from Line Updated: 06/12/24 0947     Sodium 134 mmol/L      Potassium 4.1 mmol/L      Chloride 96 mmol/L      CO2 30 mmol/L      ANION GAP 8 mmol/L      BUN 14 mg/dL      Creatinine 0.52 mg/dL      Glucose 208 mg/dL      Calcium 9.2 mg/dL      AST 11 U/L      ALT 18 U/L      Alkaline Phosphatase 64 U/L      Total Protein 7.5 g/dL      Albumin 4.1 g/dL      Total Bilirubin 0.46 mg/dL      eGFR 109 ml/min/1.73sq m     Narrative:      National Kidney Disease Foundation guidelines for Chronic Kidney Disease (CKD):     Stage 1 with normal or high GFR (GFR > 90 mL/min/1.73 square meters)    Stage 2 Mild CKD (GFR = 60-89 mL/min/1.73 square meters)    Stage 3A Moderate CKD (GFR = 45-59 mL/min/1.73 square meters)    Stage 3B Moderate CKD (GFR = 30-44 mL/min/1.73 square meters)    Stage 4 Severe CKD (GFR = 15-29 mL/min/1.73 square meters)    Stage 5 End Stage CKD (GFR <15 mL/min/1.73 square meters)  Note: GFR calculation is accurate only with a steady state creatinine                   CT abdomen pelvis with contrast   Final Result by Joaquin Quiros MD (06/12 7475)      Findings suggestive of cystitis; correlate with  urinalysis.   Constipation.   Uncomplicated colonic diverticulosis.   Marked hepatomegaly/hepatic steatosis.      The study was marked in EPIC for immediate notification.         Workstation performed: [id number]                    Procedures  Procedures         ED Course                               SBIRT 22yo+      Flowsheet Row Most Recent Value   Initial Alcohol Screen: US AUDIT-C     1. How often do you have a drink containing alcohol? 0 Filed at: 06/12/2024 0906   2. How many drinks containing alcohol do you have on a typical day you are drinking?  0 Filed at: 06/12/2024 0906   3a. Male UNDER 65: How often do you have five or more drinks on one occasion? 0 Filed at: 06/12/2024 0906   3b. FEMALE Any Age, or MALE 65+: How often do you have 4 or more drinks on one occassion? 0 Filed at: 06/12/2024 0906   Audit-C Score 0 Filed at: 06/12/2024 0906   DAT: How many times in the past year have you...    Used an illegal drug or used a prescription medication for non-medical reasons? Never Filed at: 06/12/2024 0906                      Medical Decision Making  Differential diagnosis considered includes diverticulitis, ureteral stone, UTI, other intra-abdominal pathology.  Labs reviewed.  Patient with a blood sugar of 208.  She states she ate oatmeal this morning so this is not a fasting sample.  She did have fasting blood work yesterday with a blood sugar of 116.  Sodium is 134 which is likely pseudohyponatremia related to the elevated sugar.  CT abdomen pelvis shows constipation, marked hepatomegaly, hepatic steatosis, uncomplicated colonic diverticulosis.  There is no acute pathology to explain her left lower abdominal pain.  Her urinalysis does not show any infection.  Patient was advised to follow-up with her primary care provider.  She was advised she will need to have a fasting blood sugar rechecked in the next 1 to 2 weeks.  She is advised she may take Tylenol for pain.  She states her bowel movements have been  regular.  She does not note any constipation.  Return precautions reviewed including fever or increased abdominal pain.    Amount and/or Complexity of Data Reviewed  Labs: ordered.  Radiology: ordered.    Risk  Prescription drug management.             Disposition  Final diagnoses:   Left lower quadrant abdominal pain   Constipation     Time reflects when diagnosis was documented in both MDM as applicable and the Disposition within this note       Time User Action Codes Description Comment    6/12/2024 11:41 AM Nahum Costa [R10.32] Left lower quadrant abdominal pain     6/12/2024 11:41 AM Nahum Costa [K59.00] Constipation           ED Disposition       ED Disposition   Discharge    Condition   Stable    Date/Time   Wed Jun 12, 2024 1141    Comment   Leydi Hernándeznevin discharge to home/self care.                   Follow-up Information    None         Patient's Medications   Discharge Prescriptions    No medications on file       No discharge procedures on file.    PDMP Review         Value Time User    PDMP Reviewed  Yes 2/12/2024 11:26 AM SHANI Hawk            ED Provider  Electronically Signed by             Nahum Costa PA-C  06/12/24 6077

## 2024-06-12 NOTE — DISCHARGE INSTRUCTIONS
The following is your ct scan results.   Constipation.  Uncomplicated colonic diverticulosis.  Marked hepatomegaly/hepatic steatosis.    You also have an elevated random blood sugar of 208 in the ED. Your fasting blood sugar was 116. This blood sugar should be followed by your primary care provider in next week    Return to ED for fever, increased pain, worsening symptoms

## 2024-06-13 ENCOUNTER — TELEPHONE (OUTPATIENT)
Age: 54
End: 2024-06-13

## 2024-06-13 DIAGNOSIS — K59.09 CHRONIC CONSTIPATION: Primary | ICD-10-CM

## 2024-06-13 RX ORDER — DOCUSATE SODIUM 100 MG/1
100 CAPSULE, LIQUID FILLED ORAL 2 TIMES DAILY PRN
Qty: 60 CAPSULE | Refills: 5 | Status: ON HOLD | OUTPATIENT
Start: 2024-06-13

## 2024-06-13 NOTE — TELEPHONE ENCOUNTER
Krysten from [facility] called patient was seen in ED yesterday for constipation.  They are asking if a PRN order for constipation can be added to medication.       Confirmed pharmacy [facility]    Fax # if order is added is [phone]    Thank you

## 2024-06-13 NOTE — TELEPHONE ENCOUNTER
She already has dulcolax prescribed, which can be used for constipation. Colace script sent to be used in addition if needed

## 2024-06-13 NOTE — TELEPHONE ENCOUNTER
Krysten from [facility] called back after talking with patient. She is requesting that medication for constipation be in pill form per patient request.

## 2024-06-19 ENCOUNTER — HOSPITAL ENCOUNTER (EMERGENCY)
Facility: HOSPITAL | Age: 54
Discharge: HOME | End: 2024-06-19
Attending: EMERGENCY MEDICINE
Payer: MEDICARE

## 2024-06-19 VITALS
OXYGEN SATURATION: 95 % | DIASTOLIC BLOOD PRESSURE: 81 MMHG | RESPIRATION RATE: 18 BRPM | TEMPERATURE: 98.7 F | SYSTOLIC BLOOD PRESSURE: 141 MMHG | HEART RATE: 103 BPM

## 2024-06-19 DIAGNOSIS — F32.A DEPRESSION: Primary | ICD-10-CM

## 2024-06-19 LAB — ETHANOL EXG-MCNC: 0 MG/DL

## 2024-06-19 PROCEDURE — 99284 EMERGENCY DEPT VISIT MOD MDM: CPT | Performed by: PHYSICIAN ASSISTANT

## 2024-06-19 PROCEDURE — 82075 ASSAY OF BREATH ETHANOL: CPT | Performed by: PHYSICIAN ASSISTANT

## 2024-06-19 PROCEDURE — 99285 EMERGENCY DEPT VISIT HI MDM: CPT

## 2024-06-19 NOTE — ED NOTES
Summary: Behavioral Health High Utilizer Careplan    Patient Name:Leydi Khan MRN:  [id number]         : 1970     Age: 54 y.o.    Sex: female   Utilization History:  (# of ED visits & IP admits; reasons)  Pt had 20 [facility]-ED visits from 2023-2024. ED presentations were related to SI's with no plan or a plan to cut her wrists or throat, jump or walk in front of a car or traffic, hang herself, bang her head or drink bleach.  Presentations were also related to AH, feelings that no one loves or cares about her, boyfriend break-ups, complaints about her group home and needs not being met, self-injurious superficial scratches or scrapes with pens/forks/paperclips/thumbtack, banging head and calling 911 on herself from her group home setting.     Medical presentations were related to back pain, chest pain, pain from falling, generalized body pain, UTI symptoms, bilateral leg swelling, dizziness.     Pt had multiple 30-day behavioral health readmissions.       Treatment Recommendations & Presentation:  Presentation in the ED: Pt will typically call 911 for EMS/police to bring her to ED's or is accompanied by group home staff. ED visits were related to SI's with no plan or a plan to cut her wrists or throat, jump or walk in front of a car or traffic, hang herself, bang her head or drink bleach. s were also related to AH, feelings that no one loves or cares about her, boyfriend break-ups, complaints about her group home and needs not being met, self-injurious superficial scratches or scrapes with pens/forks/paperclips, banging head and calling 911 on herself from her group home setting.  Medical visits were related to back pain, chest pain, pain from falling, generalized body pain, UTI symptoms, bilateral leg swelling, dizziness.  Pt has superficially scratched or poked herself with a thumbtack on multiple occasions and has also utilized a plastic fork to scratch herself. Pt also states she does not like living at  her group home and [facility]   Pt will call 911 on herself in order for EMS to bring her to ED's and tends to do this at certain times when group home staffing is more limited. If pt's boyfriend is being hospitalized then pt also wants hospitalization. Pt may seek hospitalization because she spent her allowance and is frustrated.           ED Recommendations: Following medical evaluation and treatment, allow pt time for de-escalation and for provider to establish acute risk versus pt's chronic behavioral disturbances.  Contact [facility] to develop a safe discharge plan back to [facility] [phone]. Specific contacts are: Letitia Lucia - Supervisor, Care Coordinator [phone] ext. [phone] or Fabio - Care Coordinator [phone] or Supervisor BRITT Betts - [phone] ext. [phone], or Therapist Dana from [facility] at [phone].         Home Medication Regimen: See most recent documentation in Epic, can verify medication with staff or nurse from [facility] [phone].           Recent Medical Work Ups:  (include Psych testing or ECT)     EKG's - 2023   Labs - 2023, 2024 Inpatient Recommendations:  Collaborate with pt's community sources to develop a comprehensive discharge plan.              Outpatient recommendations:  Pt is to continue with treatment at [facility]. [facility] staff, care coordinators, and therapist should develop a crisis plan with pt and healthy coping skills in response to her distressful feelings.              Situation/Relevant Background Info: Pt is a 54 year old female with Cognitive Impairment, Bipolar I Disorder, and Borderline Personality Disorder along with an extensive history of behavioral health inpatient admissions.  Pt resides in a group home setting at [facility]. Pt also has Care Coordinators and receives therapy and psychiatric care  through the [facility].   Pt appears to derive secondary gains from ED visits/encounters as well inpatient behavioral health (BH) admissions; therefore, group home staff has advised hospital professionals that pt should have brief behavioral health stays/hospitalizations.   Pt states that she has a boyfriend who is a support system for her; staff confirms that pt has a boyfriend who also lives at [facility] and she will often mirror his issues - pt attempts to be hospitalized when her boyfriend is being hospitalized. Pt's romantic interests may change to other individuals at her group home.  Upon BH admission, Pt often states that she does not want to be in her group home and does not want to be discharged back to her group home. Pt has a possibility of another living situation as per TIP coordinator but pt has to be stable in the community.  Pt was moved from [facility] to [facility] in 2023 and is still getting used to her new staff and housing situation. Therapist aDna states pt's most recent  admit 11/2023 was due to pt spending her allowance on take out food and then being very upset that money was gone. They intend to work on her budgeting issues.                     Diagnoses/Significant Problems (Medical & Psychiatric):     Psychiatric:  Bipolar 1 Disorder, MDD, PTSD, Borderline Personality Disorder     Medical: Hyponatremia (HCC), Seizures (HCC), Obesity, Hyperlipidemia, HTN, Cognitive Impairment                  Drivers of repeated utilization:  Secondary gains from ED visits and inpatient BH stays, impulsivity, limited coping skills, attention seeking behaviors, wanting to be in hospital if her boyfriend is hospitalized, personality disordered behaviors, unhappy with group home.                                                  Existing Community Resources & Supports:                 Little to no family involvement.   Pt will state she has a  boyfriend          Patient Medical & Psychiatric Care Team:  [facility] [phone]  [facility] [place] [phone]  Therapist Dana - [phone] ext [phone]  Letitia Lucia - supervisor care coordinator [phone]. [phone]  Fabio - care coordinator [phone]  Ivan Betts - [phone] ext. [phone], supervisor

## 2024-06-19 NOTE — DISCHARGE INSTRUCTIONS
Follow up with your therapist and psychiatrist.   Continue your current medications.   This writer discussed the patients current presentation and recommended discharge plan with Yoanna Lopez PA-C They agree with the patient being discharged at this time to [facility].      The patient was Instructed to follow up with their Therapist and Psychiatrist.   .      This writer and the patient completed a safety plan.  The patient was provided with a copy of their safety plan with encouragement to utilize the plan following discharge.      In addition, the patient was instructed to call [facility] crisis, other crisis services, [facility] or to go to the nearest ER immediately if their situation changes at any time.      This writer discussed discharge plans with the patient and family  who agrees with and understands the discharge plans.            SAFETY PLAN  Warning Signs (thoughts, images, mood, behavior, situations) of a potential crisis: Depression        Coping Skills (what can I do to take my mind off the problem, or to keep myself safe): coloring watching TV        Outside Support (who can I reach out to for support and help): [facility] Staff           National Suicide Prevention Hotline:  988       [facility] [phone] - Crisis   [facility] [phone] - LVF Crisis/Mobile Crisis   [phone] - SLPF Crisis   [facility]: [phone]  [facility]: [phone]   [facility] [phone] - Crisis   [facility] [phone] - Crisis      [facility] [phone] - Crisis   [facility] [phone] - Crisis   [phone] - Peer Support Talk Line (1-9pm daily)  [phone] - Teen Support Talk Line (1-9pm daily)  [phone] - [facility]   [facility] [phone]- Crisis    [facility] [phone] - Crisis   [facility] [phone] - Crisis    [facility]) [phone] - [facility]

## 2024-06-19 NOTE — ED NOTES
This writer discussed the patients current presentation and recommended discharge plan with Yoanna Lopez PA-C They agree with the patient being discharged at this time to [facility].     The patient was Instructed to follow up with their Therapist and Psychiatrist.   .     This writer and the patient completed a safety plan.  The patient was provided with a copy of their safety plan with encouragement to utilize the plan following discharge.     In addition, the patient was instructed to call local [facility] crisis, other crisis services, 91 or to go to the nearest ER immediately if their situation changes at any time.     This writer discussed discharge plans with the patient and family  who agrees with and understands the discharge plans.         SAFETY PLAN  Warning Signs (thoughts, images, mood, behavior, situations) of a potential crisis: Depression      Coping Skills (what can I do to take my mind off the problem, or to keep myself safe): coloring watching TV      Outside Support (who can I reach out to for support and help): [facility]        [place] Suicide Prevention Hotline:  [address] [place] [address] [phone] - Crisis   [facility] [phone] - LVF Crisis/Mobile Crisis   [phone] - SLPF Crisis   [facility]: [phone]  [facility]: [phone]   [facility] [phone] - Crisis   [facility] [phone] - Crisis     [facility] [phone] - Crisis   [facility] [phone] - Crisis   [phone] - Peer Support Talk Line (1-9pm daily)  [phone] - Teen Support Talk Line (1-9pm daily)  [phone] - [facility]   [facility] [phone]- Crisis    [facility] [phone] - Crisis   [facility] [phone] - Crisis    [facility]) [phone] - [facility]
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
## 2024-06-20 ENCOUNTER — HOSPITAL ENCOUNTER (EMERGENCY)
Facility: HOSPITAL | Age: 54
End: 2024-06-20
Attending: EMERGENCY MEDICINE
Payer: MEDICARE

## 2024-06-20 ENCOUNTER — HOSPITAL ENCOUNTER (INPATIENT)
Facility: HOSPITAL | Age: 54
LOS: 15 days | Discharge: NURSING, REHAB OR LONG-TERM CARE | DRG: 885 | End: 2024-07-05
Attending: PSYCHIATRY & NEUROLOGY | Admitting: PSYCHIATRY & NEUROLOGY
Payer: MEDICARE

## 2024-06-20 VITALS
OXYGEN SATURATION: 96 % | SYSTOLIC BLOOD PRESSURE: 123 MMHG | DIASTOLIC BLOOD PRESSURE: 64 MMHG | TEMPERATURE: 98.4 F | HEART RATE: 85 BPM | RESPIRATION RATE: 20 BRPM

## 2024-06-20 DIAGNOSIS — G47.00 INSOMNIA: ICD-10-CM

## 2024-06-20 DIAGNOSIS — F25.1 SCHIZOAFFECTIVE DISORDER, DEPRESSIVE TYPE (HCC): Primary | ICD-10-CM

## 2024-06-20 DIAGNOSIS — K59.00 CONSTIPATION: ICD-10-CM

## 2024-06-20 DIAGNOSIS — L30.4 INTERTRIGO: ICD-10-CM

## 2024-06-20 DIAGNOSIS — I10 PRIMARY HYPERTENSION: ICD-10-CM

## 2024-06-20 DIAGNOSIS — F32.A DEPRESSION: ICD-10-CM

## 2024-06-20 DIAGNOSIS — S51.812A LACERATION OF LEFT FOREARM: ICD-10-CM

## 2024-06-20 DIAGNOSIS — F32.A DEPRESSION: Primary | ICD-10-CM

## 2024-06-20 DIAGNOSIS — R07.89 ATYPICAL CHEST PAIN: ICD-10-CM

## 2024-06-20 DIAGNOSIS — R60.0 BILATERAL LEG EDEMA: ICD-10-CM

## 2024-06-20 DIAGNOSIS — L85.3 DRY SKIN: ICD-10-CM

## 2024-06-20 DIAGNOSIS — E78.2 MIXED HYPERLIPIDEMIA: ICD-10-CM

## 2024-06-20 DIAGNOSIS — F41.1 GENERALIZED ANXIETY DISORDER: ICD-10-CM

## 2024-06-20 DIAGNOSIS — N39.0 UTI (URINARY TRACT INFECTION): ICD-10-CM

## 2024-06-20 DIAGNOSIS — E55.9 VITAMIN D DEFICIENCY: ICD-10-CM

## 2024-06-20 DIAGNOSIS — F32.3 MAJOR DEPRESSION WITH PSYCHOTIC FEATURES (HCC): ICD-10-CM

## 2024-06-20 DIAGNOSIS — R45.851 SUICIDAL IDEATIONS: ICD-10-CM

## 2024-06-20 LAB
ALBUMIN SERPL BCG-MCNC: 4 G/DL (ref 3.5–5)
ALP SERPL-CCNC: 61 U/L (ref 34–104)
ALT SERPL W P-5'-P-CCNC: 25 U/L (ref 7–52)
AMPHETAMINES SERPL QL SCN: NEGATIVE
ANION GAP SERPL CALCULATED.3IONS-SCNC: 9 MMOL/L (ref 4–13)
AST SERPL W P-5'-P-CCNC: 16 U/L (ref 13–39)
ATRIAL RATE: 100 BPM
BACTERIA UR QL AUTO: ABNORMAL /HPF
BARBITURATES UR QL: NEGATIVE
BASOPHILS # BLD MANUAL: 0 THOUSAND/UL (ref 0–0.1)
BASOPHILS NFR MAR MANUAL: 0 % (ref 0–1)
BENZODIAZ UR QL: NEGATIVE
BILIRUB SERPL-MCNC: 0.41 MG/DL (ref 0.2–1)
BILIRUB UR QL STRIP: NEGATIVE
BUN SERPL-MCNC: 14 MG/DL (ref 5–25)
CALCIUM SERPL-MCNC: 9.5 MG/DL (ref 8.4–10.2)
CHLORIDE SERPL-SCNC: 99 MMOL/L (ref 96–108)
CLARITY UR: ABNORMAL
CO2 SERPL-SCNC: 29 MMOL/L (ref 21–32)
COCAINE UR QL: NEGATIVE
COLOR UR: YELLOW
CREAT SERPL-MCNC: 0.58 MG/DL (ref 0.6–1.3)
EOSINOPHIL # BLD MANUAL: 0.08 THOUSAND/UL (ref 0–0.4)
EOSINOPHIL NFR BLD MANUAL: 1 % (ref 0–6)
ERYTHROCYTE [DISTWIDTH] IN BLOOD BY AUTOMATED COUNT: 14.1 % (ref 11.6–15.1)
ETHANOL EXG-MCNC: 0 MG/DL
FENTANYL UR QL SCN: NEGATIVE
GFR SERPL CREATININE-BSD FRML MDRD: 105 ML/MIN/1.73SQ M
GLUCOSE SERPL-MCNC: 165 MG/DL (ref 65–140)
GLUCOSE UR STRIP-MCNC: NEGATIVE MG/DL
HCT VFR BLD AUTO: 35.4 % (ref 34.8–46.1)
HGB BLD-MCNC: 12 G/DL (ref 11.5–15.4)
HGB UR QL STRIP.AUTO: NEGATIVE
HYDROCODONE UR QL SCN: NEGATIVE
KETONES UR STRIP-MCNC: NEGATIVE MG/DL
LEUKOCYTE ESTERASE UR QL STRIP: ABNORMAL
LYMPHOCYTES # BLD AUTO: 0.7 THOUSAND/UL (ref 0.6–4.47)
LYMPHOCYTES # BLD AUTO: 9 % (ref 14–44)
MCH RBC QN AUTO: 32.3 PG (ref 26.8–34.3)
MCHC RBC AUTO-ENTMCNC: 33.9 G/DL (ref 31.4–37.4)
MCV RBC AUTO: 95 FL (ref 82–98)
METHADONE UR QL: NEGATIVE
MONOCYTES # BLD AUTO: 0.16 THOUSAND/UL (ref 0–1.22)
MONOCYTES NFR BLD: 2 % (ref 4–12)
MUCOUS THREADS UR QL AUTO: ABNORMAL
NEUTROPHILS # BLD MANUAL: 6.87 THOUSAND/UL (ref 1.85–7.62)
NEUTS SEG NFR BLD AUTO: 88 % (ref 43–75)
NITRITE UR QL STRIP: NEGATIVE
NON-SQ EPI CELLS URNS QL MICRO: ABNORMAL /HPF
OPIATES UR QL SCN: NEGATIVE
OXYCODONE+OXYMORPHONE UR QL SCN: NEGATIVE
P AXIS: 60 DEGREES
PCP UR QL: NEGATIVE
PH UR STRIP.AUTO: 7 [PH]
PLATELET # BLD AUTO: 177 THOUSANDS/UL (ref 149–390)
PLATELET BLD QL SMEAR: ADEQUATE
PMV BLD AUTO: 9.4 FL (ref 8.9–12.7)
POTASSIUM SERPL-SCNC: 3.7 MMOL/L (ref 3.5–5.3)
PR INTERVAL: 184 MS
PROT SERPL-MCNC: 7.3 G/DL (ref 6.4–8.4)
PROT UR STRIP-MCNC: NEGATIVE MG/DL
QRS AXIS: 65 DEGREES
QRSD INTERVAL: 86 MS
QT INTERVAL: 342 MS
QTC INTERVAL: 441 MS
RBC # BLD AUTO: 3.71 MILLION/UL (ref 3.81–5.12)
RBC #/AREA URNS AUTO: ABNORMAL /HPF
RBC MORPH BLD: NORMAL
SODIUM SERPL-SCNC: 137 MMOL/L (ref 135–147)
SP GR UR STRIP.AUTO: 1.02 (ref 1–1.03)
T WAVE AXIS: 64 DEGREES
THC UR QL: NEGATIVE
TSH SERPL DL<=0.05 MIU/L-ACNC: 3.24 UIU/ML (ref 0.45–4.5)
UROBILINOGEN UR STRIP-ACNC: 4 MG/DL
VENTRICULAR RATE: 100 BPM
WBC # BLD AUTO: 7.81 THOUSAND/UL (ref 4.31–10.16)
WBC #/AREA URNS AUTO: ABNORMAL /HPF

## 2024-06-20 PROCEDURE — 93005 ELECTROCARDIOGRAM TRACING: CPT

## 2024-06-20 PROCEDURE — 80307 DRUG TEST PRSMV CHEM ANLYZR: CPT | Performed by: PHYSICIAN ASSISTANT

## 2024-06-20 PROCEDURE — 99284 EMERGENCY DEPT VISIT MOD MDM: CPT

## 2024-06-20 PROCEDURE — 85007 BL SMEAR W/DIFF WBC COUNT: CPT | Performed by: PHYSICIAN ASSISTANT

## 2024-06-20 PROCEDURE — 99285 EMERGENCY DEPT VISIT HI MDM: CPT | Performed by: PHYSICIAN ASSISTANT

## 2024-06-20 PROCEDURE — 85027 COMPLETE CBC AUTOMATED: CPT | Performed by: PHYSICIAN ASSISTANT

## 2024-06-20 PROCEDURE — 36415 COLL VENOUS BLD VENIPUNCTURE: CPT | Performed by: PHYSICIAN ASSISTANT

## 2024-06-20 PROCEDURE — 82075 ASSAY OF BREATH ETHANOL: CPT | Performed by: PHYSICIAN ASSISTANT

## 2024-06-20 PROCEDURE — 80053 COMPREHEN METABOLIC PANEL: CPT | Performed by: PHYSICIAN ASSISTANT

## 2024-06-20 PROCEDURE — 87086 URINE CULTURE/COLONY COUNT: CPT | Performed by: PHYSICIAN ASSISTANT

## 2024-06-20 PROCEDURE — 81001 URINALYSIS AUTO W/SCOPE: CPT | Performed by: PHYSICIAN ASSISTANT

## 2024-06-20 PROCEDURE — 93010 ELECTROCARDIOGRAM REPORT: CPT | Performed by: INTERNAL MEDICINE

## 2024-06-20 PROCEDURE — 84443 ASSAY THYROID STIM HORMONE: CPT | Performed by: PHYSICIAN ASSISTANT

## 2024-06-20 RX ORDER — TRAZODONE HYDROCHLORIDE 50 MG/1
50 TABLET ORAL
Status: DISCONTINUED | OUTPATIENT
Start: 2024-06-20 | End: 2024-06-20 | Stop reason: HOSPADM

## 2024-06-20 RX ORDER — LANOLIN ALCOHOL/MO/W.PET/CERES
3 CREAM (GRAM) TOPICAL
Status: CANCELLED | OUTPATIENT
Start: 2024-06-20

## 2024-06-20 RX ORDER — RISPERIDONE 0.25 MG/1
0.5 TABLET ORAL
Status: CANCELLED | OUTPATIENT
Start: 2024-06-20

## 2024-06-20 RX ORDER — LANOLIN ALCOHOL/MO/W.PET/CERES
3 CREAM (GRAM) TOPICAL
Status: DISCONTINUED | OUTPATIENT
Start: 2024-06-21 | End: 2024-07-05 | Stop reason: HOSPADM

## 2024-06-20 RX ORDER — PROPRANOLOL HYDROCHLORIDE 10 MG/1
5 TABLET ORAL EVERY 8 HOURS PRN
Status: CANCELLED | OUTPATIENT
Start: 2024-06-20

## 2024-06-20 RX ORDER — DESVENLAFAXINE SUCCINATE 50 MG/1
100 TABLET, EXTENDED RELEASE ORAL DAILY
Status: DISCONTINUED | OUTPATIENT
Start: 2024-06-21 | End: 2024-06-20 | Stop reason: HOSPADM

## 2024-06-20 RX ORDER — TRAZODONE HYDROCHLORIDE 50 MG/1
50 TABLET ORAL
Status: CANCELLED | OUTPATIENT
Start: 2024-06-20

## 2024-06-20 RX ORDER — RISPERIDONE 0.5 MG/1
0.5 TABLET ORAL
Status: DISCONTINUED | OUTPATIENT
Start: 2024-06-20 | End: 2024-07-05 | Stop reason: HOSPADM

## 2024-06-20 RX ORDER — RISPERIDONE 0.25 MG/1
0.25 TABLET ORAL
Status: CANCELLED | OUTPATIENT
Start: 2024-06-20

## 2024-06-20 RX ORDER — ACETAMINOPHEN 325 MG/1
650 TABLET ORAL EVERY 4 HOURS PRN
Status: CANCELLED | OUTPATIENT
Start: 2024-06-20

## 2024-06-20 RX ORDER — HALOPERIDOL 5 MG/ML
5 INJECTION INTRAMUSCULAR
Status: CANCELLED | OUTPATIENT
Start: 2024-06-20

## 2024-06-20 RX ORDER — CEPHALEXIN 250 MG/1
500 CAPSULE ORAL EVERY 12 HOURS SCHEDULED
Status: DISCONTINUED | OUTPATIENT
Start: 2024-06-20 | End: 2024-06-20 | Stop reason: HOSPADM

## 2024-06-20 RX ORDER — ACETAMINOPHEN 325 MG/1
975 TABLET ORAL EVERY 6 HOURS PRN
Status: CANCELLED | OUTPATIENT
Start: 2024-06-20

## 2024-06-20 RX ORDER — ATORVASTATIN CALCIUM 10 MG/1
10 TABLET, FILM COATED ORAL
Status: DISCONTINUED | OUTPATIENT
Start: 2024-06-20 | End: 2024-06-20 | Stop reason: HOSPADM

## 2024-06-20 RX ORDER — RISPERIDONE 1 MG/1
1 TABLET ORAL
Status: CANCELLED | OUTPATIENT
Start: 2024-06-20

## 2024-06-20 RX ORDER — PANTOPRAZOLE SODIUM 20 MG/1
20 TABLET, DELAYED RELEASE ORAL DAILY
Status: DISCONTINUED | OUTPATIENT
Start: 2024-06-21 | End: 2024-06-20 | Stop reason: HOSPADM

## 2024-06-20 RX ORDER — TRAZODONE HYDROCHLORIDE 50 MG/1
50 TABLET ORAL
Status: DISCONTINUED | OUTPATIENT
Start: 2024-06-20 | End: 2024-07-05 | Stop reason: HOSPADM

## 2024-06-20 RX ORDER — IBUPROFEN 600 MG/1
600 TABLET ORAL EVERY 6 HOURS PRN
Status: DISCONTINUED | OUTPATIENT
Start: 2024-06-20 | End: 2024-06-20 | Stop reason: HOSPADM

## 2024-06-20 RX ORDER — HYDROXYZINE 50 MG/1
50 TABLET, FILM COATED ORAL
Status: DISCONTINUED | OUTPATIENT
Start: 2024-06-20 | End: 2024-07-05 | Stop reason: HOSPADM

## 2024-06-20 RX ORDER — PROPRANOLOL HYDROCHLORIDE 10 MG/1
5 TABLET ORAL EVERY 8 HOURS PRN
Status: DISCONTINUED | OUTPATIENT
Start: 2024-06-20 | End: 2024-07-05 | Stop reason: HOSPADM

## 2024-06-20 RX ORDER — LISINOPRIL 20 MG/1
20 TABLET ORAL DAILY
Status: DISCONTINUED | OUTPATIENT
Start: 2024-06-21 | End: 2024-06-20 | Stop reason: HOSPADM

## 2024-06-20 RX ORDER — RISPERIDONE 1 MG/1
1 TABLET ORAL
Status: DISCONTINUED | OUTPATIENT
Start: 2024-06-20 | End: 2024-07-05 | Stop reason: HOSPADM

## 2024-06-20 RX ORDER — ESCITALOPRAM OXALATE 10 MG/1
10 TABLET ORAL DAILY
Status: DISCONTINUED | OUTPATIENT
Start: 2024-06-21 | End: 2024-06-20 | Stop reason: HOSPADM

## 2024-06-20 RX ORDER — MAGNESIUM HYDROXIDE/ALUMINUM HYDROXICE/SIMETHICONE 120; 1200; 1200 MG/30ML; MG/30ML; MG/30ML
30 SUSPENSION ORAL EVERY 4 HOURS PRN
Status: CANCELLED | OUTPATIENT
Start: 2024-06-20

## 2024-06-20 RX ORDER — HALOPERIDOL 5 MG/ML
5 INJECTION INTRAMUSCULAR
Status: DISCONTINUED | OUTPATIENT
Start: 2024-06-20 | End: 2024-07-05 | Stop reason: HOSPADM

## 2024-06-20 RX ORDER — BENZTROPINE MESYLATE 0.5 MG/1
0.5 TABLET ORAL DAILY
Status: DISCONTINUED | OUTPATIENT
Start: 2024-06-21 | End: 2024-06-20 | Stop reason: HOSPADM

## 2024-06-20 RX ORDER — MAGNESIUM HYDROXIDE/ALUMINUM HYDROXICE/SIMETHICONE 120; 1200; 1200 MG/30ML; MG/30ML; MG/30ML
30 SUSPENSION ORAL EVERY 4 HOURS PRN
Status: DISCONTINUED | OUTPATIENT
Start: 2024-06-20 | End: 2024-07-05 | Stop reason: HOSPADM

## 2024-06-20 RX ORDER — HYDROXYZINE HYDROCHLORIDE 25 MG/1
25 TABLET, FILM COATED ORAL
Status: CANCELLED | OUTPATIENT
Start: 2024-06-20

## 2024-06-20 RX ORDER — CLONAZEPAM 0.5 MG/1
0.5 TABLET ORAL 2 TIMES DAILY
Status: DISCONTINUED | OUTPATIENT
Start: 2024-06-20 | End: 2024-06-20 | Stop reason: HOSPADM

## 2024-06-20 RX ORDER — FUROSEMIDE 20 MG/1
20 TABLET ORAL DAILY
Status: DISCONTINUED | OUTPATIENT
Start: 2024-06-21 | End: 2024-06-20 | Stop reason: HOSPADM

## 2024-06-20 RX ORDER — ACETAMINOPHEN 325 MG/1
975 TABLET ORAL EVERY 6 HOURS PRN
Status: DISCONTINUED | OUTPATIENT
Start: 2024-06-20 | End: 2024-07-05 | Stop reason: HOSPADM

## 2024-06-20 RX ORDER — RISPERIDONE 0.25 MG/1
0.25 TABLET ORAL
Status: DISCONTINUED | OUTPATIENT
Start: 2024-06-20 | End: 2024-07-05 | Stop reason: HOSPADM

## 2024-06-20 RX ORDER — GABAPENTIN 100 MG/1
100 CAPSULE ORAL 3 TIMES DAILY
Status: DISCONTINUED | OUTPATIENT
Start: 2024-06-20 | End: 2024-06-20 | Stop reason: HOSPADM

## 2024-06-20 RX ORDER — DIVALPROEX SODIUM 500 MG/1
1000 TABLET, DELAYED RELEASE ORAL
Status: DISCONTINUED | OUTPATIENT
Start: 2024-06-20 | End: 2024-06-20 | Stop reason: HOSPADM

## 2024-06-20 RX ORDER — ACETAMINOPHEN 325 MG/1
650 TABLET ORAL EVERY 4 HOURS PRN
Status: DISCONTINUED | OUTPATIENT
Start: 2024-06-20 | End: 2024-07-05 | Stop reason: HOSPADM

## 2024-06-20 RX ORDER — HYDROXYZINE HYDROCHLORIDE 25 MG/1
25 TABLET, FILM COATED ORAL
Status: DISCONTINUED | OUTPATIENT
Start: 2024-06-20 | End: 2024-07-05 | Stop reason: HOSPADM

## 2024-06-20 RX ORDER — HYDROXYZINE HYDROCHLORIDE 25 MG/1
50 TABLET, FILM COATED ORAL
Status: CANCELLED | OUTPATIENT
Start: 2024-06-20

## 2024-06-20 RX ORDER — ACETAMINOPHEN 325 MG/1
650 TABLET ORAL EVERY 4 HOURS PRN
Status: DISCONTINUED | OUTPATIENT
Start: 2024-06-20 | End: 2024-06-20 | Stop reason: HOSPADM

## 2024-06-20 RX ADMIN — GABAPENTIN 100 MG: 100 CAPSULE ORAL at 21:10

## 2024-06-20 RX ADMIN — RISPERIDONE 3 MG: 2 TABLET, FILM COATED ORAL at 17:39

## 2024-06-20 RX ADMIN — ATORVASTATIN CALCIUM 10 MG: 10 TABLET, FILM COATED ORAL at 21:10

## 2024-06-20 RX ADMIN — GABAPENTIN 100 MG: 100 CAPSULE ORAL at 17:39

## 2024-06-20 RX ADMIN — DIVALPROEX SODIUM 1000 MG: 500 TABLET, DELAYED RELEASE ORAL at 22:14

## 2024-06-20 RX ADMIN — CEPHALEXIN 500 MG: 250 CAPSULE ORAL at 21:10

## 2024-06-20 RX ADMIN — TRAZODONE HYDROCHLORIDE 50 MG: 50 TABLET ORAL at 21:10

## 2024-06-20 RX ADMIN — CLONAZEPAM 0.5 MG: 0.5 TABLET ORAL at 17:39
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
## 2024-06-20 NOTE — ED NOTES
Summary: Behavioral Health High Utilizer Careplan    Patient Name:Leydi Khan MRN:  [id number]         : 1970     Age: 54 y.o.    Sex: female   Utilization History:  (# of ED visits & IP admits; reasons)  Pt had 20 [facility]-ED visits from 2023-2024. ED presentations were related to SI's with no plan or a plan to cut her wrists or throat, jump or walk in front of a car or traffic, hang herself, bang her head or drink bleach.  Presentations were also related to AH, feelings that no one loves or cares about her, boyfriend break-ups, complaints about her group home and needs not being met, self-injurious superficial scratches or scrapes with pens/forks/paperclips/thumbtack, banging head and calling 911 on herself from her group home setting.     Medical presentations were related to back pain, chest pain, pain from falling, generalized body pain, UTI symptoms, bilateral leg swelling, dizziness.     Pt had multiple 30-day behavioral health readmissions.       Treatment Recommendations & Presentation:  Presentation in the ED: Pt will typically call 911 for EMS/police to bring her to ED's or is accompanied by group home staff. ED visits were related to SI's with no plan or a plan to cut her wrists or throat, jump or walk in front of a car or traffic, hang herself, bang her head or drink bleach. s were also related to AH, feelings that no one loves or cares about her, boyfriend break-ups, complaints about her group home and needs not being met, self-injurious superficial scratches or scrapes with pens/forks/paperclips, banging head and calling 911 on herself from her group home setting.  Medical visits were related to back pain, chest pain, pain from falling, generalized body pain, UTI symptoms, bilateral leg swelling, dizziness.  Pt has superficially scratched or poked herself with a thumbtack on multiple occasions and has also utilized a plastic fork to scratch herself. Pt also states she does not like living at  [facility] group [facility] and [facility]   Pt will call 911 on herself in order for EMS to bring her to ED's and tends to do this at certain times when group home staffing is more limited. If pt's boyfriend is being hospitalized then pt also wants hospitalization. Pt may seek hospitalization because she spent her allowance and is frustrated.           ED Recommendations: Following medical evaluation and treatment, allow pt time for de-escalation and for provider to establish acute risk versus pt's chronic behavioral disturbances.  Contact [facility] to develop a safe discharge plan back to [facility] [phone]. Specific contacts are: Letitia Lucia - Supervisor, Care Coordinator [phone]. [phone] or Fabio - Care Coordinator [phone] or Supervisor BRITT Betts - [phone]. [phone], or Therapist aDna from [facility] at [phone].         Home Medication Regimen: See most recent documentation in Epic, can verify medication with staff or nurse from [facility] [phone].           Recent Medical Work Ups:  (include Psych testing or ECT)     EKG's - 2023   Labs - 2023, 2024 Inpatient Recommendations:  Collaborate with pt's community sources to develop a comprehensive discharge plan.              Outpatient recommendations:  Pt is to continue with treatment at [facility]. [facility] staff, care coordinators, and therapist should develop a crisis plan with pt and healthy coping skills in response to her distressful feelings.              Situation/Relevant Background Info: Pt is a 54 year old female with Cognitive Impairment, Bipolar I Disorder, and Borderline Personality Disorder along with an extensive history of behavioral health inpatient admissions.  Pt resides in a group home setting at [facility]. Pt also has Care Coordinators and receives therapy and psychiatric care  through the [facility].   Pt appears to derive secondary gains from ED visits/encounters as well inpatient behavioral health (BH) admissions; therefore, group home staff has advised hospital professionals that pt should have brief behavioral health stays/hospitalizations.   Pt states that she has a boyfriend who is a support system for her; staff confirms that pt has a boyfriend who also lives at [facility] and she will often mirror his issues - pt attempts to be hospitalized when her boyfriend is being hospitalized. Pt's romantic interests may change to other individuals at her group home.  Upon BH admission, Pt often states that she does not want to be in her group home and does not want to be discharged back to her group home. Pt has a possibility of another living situation as per TIP coordinator but pt has to be stable in the community.  Pt was moved from [facility] to [facility] in 2023 and is still getting used to her new staff and housing situation. Therapist Dana states pt's most recent  admit 11/2023 was due to pt spending her allowance on take out food and then being very upset that money was gone. They intend to work on her budgeting issues.                     Diagnoses/Significant Problems (Medical & Psychiatric):     Psychiatric:  Bipolar 1 Disorder, MDD, PTSD, Borderline Personality Disorder     Medical: Hyponatremia (HCC), Seizures (HCC), Obesity, Hyperlipidemia, HTN, Cognitive Impairment                  Drivers of repeated utilization:  Secondary gains from ED visits and inpatient BH stays, impulsivity, limited coping skills, attention seeking behaviors, wanting to be in hospital if her boyfriend is hospitalized, personality disordered behaviors, unhappy with group home.                                                  Existing Community Resources & Supports:                 Little to no family involvement.   Pt will state she has a  boyfriend          Patient Medical & Psychiatric Care Team:  [facility] [phone]  [facility] [phone]  Therapist Dana - [phone]  Letitia Lucia - supervisor care coordinator [phone]. [phone]  Fabio - care coordinator [phone]  Ivan Betts - [phone] ext. [phone], supervisor      Care plan date: 05/06/24                   Author:  Brianna Coleman RN                 Date reviewed with patient:               Electronically signed by Brianna Coleman RN at 5/6/2024 12:23 PM

## 2024-06-20 NOTE — ED NOTES
201 was signed by the patient and the Provider.  Rights and Restrictions where explained. Pt requested to be referred to [facility] or [facility].

## 2024-06-20 NOTE — ED NOTES
Patient is accepted at [facility].  Patient is accepted by Dr.Qureshi david Hassan.     Transportation is arranged with [employer].     Transportation is scheduled for 2300-SDM.   Patient may go to the floor at 2300.          Nurse report is to be called to [phone] prior to patient transfer.
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
## 2024-06-20 NOTE — ED NOTES
54 y.o female patient presented to the ED with SI and self harming behaviors. Pt resides at [facility].  Pt has a High Utilizer Plan.  Pt presented to the ED last night for SI with no plan. Pt's SI was more behavioral than being an acute risk behaviors.  Pt was discharged  back to her group home as per \[facility]\"" recommendations. Pt's reported she has SI and self harmed herself today by taking a stapler and cut her arm in attempt to kill herself. Pt reported having a confrontation with another resident which is making her feel depressed and want to hurt herself. Pt stated she had a fight with her boyfriend today which made her upset. Pt denies any HI and A/V hallucinations. Pt has had multiple hospitalizations for mental health.  Pt has outpatient Psychiatry and therapy with [facility].  Pt denies any legal and substance abuse issues. Pt is wiling to sign a 201.  Provider is in agreement with this treatment plan.

## 2024-06-21 LAB
25(OH)D3 SERPL-MCNC: 13.6 NG/ML (ref 30–100)
ALBUMIN SERPL BCG-MCNC: 4 G/DL (ref 3.5–5)
ALP SERPL-CCNC: 55 U/L (ref 34–104)
ALT SERPL W P-5'-P-CCNC: 23 U/L (ref 7–52)
ANION GAP SERPL CALCULATED.3IONS-SCNC: 9 MMOL/L (ref 4–13)
AST SERPL W P-5'-P-CCNC: 14 U/L (ref 13–39)
ATRIAL RATE: 77 BPM
BACTERIA UR CULT: NORMAL
BILIRUB SERPL-MCNC: 0.51 MG/DL (ref 0.2–1)
BUN SERPL-MCNC: 10 MG/DL (ref 5–25)
CALCIUM SERPL-MCNC: 9.4 MG/DL (ref 8.4–10.2)
CHLORIDE SERPL-SCNC: 98 MMOL/L (ref 96–108)
CHOLEST SERPL-MCNC: 168 MG/DL
CO2 SERPL-SCNC: 30 MMOL/L (ref 21–32)
CREAT SERPL-MCNC: 0.42 MG/DL (ref 0.6–1.3)
ERYTHROCYTE [DISTWIDTH] IN BLOOD BY AUTOMATED COUNT: 14.3 % (ref 11.6–15.1)
FOLATE SERPL-MCNC: 10.2 NG/ML
GFR SERPL CREATININE-BSD FRML MDRD: 117 ML/MIN/1.73SQ M
GLUCOSE P FAST SERPL-MCNC: 114 MG/DL (ref 65–99)
GLUCOSE SERPL-MCNC: 114 MG/DL (ref 65–140)
HCT VFR BLD AUTO: 37.1 % (ref 34.8–46.1)
HDLC SERPL-MCNC: 32 MG/DL
HGB BLD-MCNC: 12.2 G/DL (ref 11.5–15.4)
LDLC SERPL CALC-MCNC: 105 MG/DL (ref 0–100)
MCH RBC QN AUTO: 32 PG (ref 26.8–34.3)
MCHC RBC AUTO-ENTMCNC: 32.9 G/DL (ref 31.4–37.4)
MCV RBC AUTO: 97 FL (ref 82–98)
NONHDLC SERPL-MCNC: 136 MG/DL
P AXIS: 19 DEGREES
PLATELET # BLD AUTO: 173 THOUSANDS/UL (ref 149–390)
PMV BLD AUTO: 9.7 FL (ref 8.9–12.7)
POTASSIUM SERPL-SCNC: 4 MMOL/L (ref 3.5–5.3)
PR INTERVAL: 176 MS
PROT SERPL-MCNC: 7 G/DL (ref 6.4–8.4)
QRS AXIS: 39 DEGREES
QRSD INTERVAL: 92 MS
QT INTERVAL: 380 MS
QTC INTERVAL: 430 MS
RBC # BLD AUTO: 3.81 MILLION/UL (ref 3.81–5.12)
SODIUM SERPL-SCNC: 137 MMOL/L (ref 135–147)
T WAVE AXIS: 53 DEGREES
TREPONEMA PALLIDUM IGG+IGM AB [PRESENCE] IN SERUM OR PLASMA BY IMMUNOASSAY: NORMAL
TRIGL SERPL-MCNC: 156 MG/DL
VENTRICULAR RATE: 77 BPM
VIT B12 SERPL-MCNC: 423 PG/ML (ref 180–914)
WBC # BLD AUTO: 6.94 THOUSAND/UL (ref 4.31–10.16)

## 2024-06-21 PROCEDURE — 80061 LIPID PANEL: CPT | Performed by: PSYCHIATRY & NEUROLOGY

## 2024-06-21 PROCEDURE — 86780 TREPONEMA PALLIDUM: CPT | Performed by: PSYCHIATRY & NEUROLOGY

## 2024-06-21 PROCEDURE — 82607 VITAMIN B-12: CPT | Performed by: PSYCHIATRY & NEUROLOGY

## 2024-06-21 PROCEDURE — 99223 1ST HOSP IP/OBS HIGH 75: CPT | Performed by: PSYCHIATRY & NEUROLOGY

## 2024-06-21 PROCEDURE — 82746 ASSAY OF FOLIC ACID SERUM: CPT | Performed by: PSYCHIATRY & NEUROLOGY

## 2024-06-21 PROCEDURE — 85027 COMPLETE CBC AUTOMATED: CPT | Performed by: PSYCHIATRY & NEUROLOGY

## 2024-06-21 PROCEDURE — 93005 ELECTROCARDIOGRAM TRACING: CPT

## 2024-06-21 PROCEDURE — 93010 ELECTROCARDIOGRAM REPORT: CPT | Performed by: INTERNAL MEDICINE

## 2024-06-21 PROCEDURE — 82306 VITAMIN D 25 HYDROXY: CPT | Performed by: PSYCHIATRY & NEUROLOGY

## 2024-06-21 PROCEDURE — 80053 COMPREHEN METABOLIC PANEL: CPT | Performed by: PSYCHIATRY & NEUROLOGY

## 2024-06-21 RX ORDER — CEPHALEXIN 500 MG/1
500 CAPSULE ORAL EVERY 12 HOURS SCHEDULED
COMMUNITY
End: 2024-07-05

## 2024-06-21 RX ORDER — GABAPENTIN 100 MG/1
100 CAPSULE ORAL 3 TIMES DAILY
Status: DISCONTINUED | OUTPATIENT
Start: 2024-06-21 | End: 2024-06-21

## 2024-06-21 RX ORDER — LISINOPRIL 20 MG/1
20 TABLET ORAL DAILY
Status: DISCONTINUED | OUTPATIENT
Start: 2024-06-21 | End: 2024-07-05 | Stop reason: HOSPADM

## 2024-06-21 RX ORDER — GABAPENTIN 100 MG/1
100 CAPSULE ORAL 3 TIMES DAILY
Status: DISCONTINUED | OUTPATIENT
Start: 2024-06-21 | End: 2024-07-02

## 2024-06-21 RX ORDER — MECLIZINE HCL 12.5 MG/1
12.5 TABLET ORAL EVERY 8 HOURS PRN
Status: DISCONTINUED | OUTPATIENT
Start: 2024-06-21 | End: 2024-07-05 | Stop reason: HOSPADM

## 2024-06-21 RX ORDER — PRAZOSIN HYDROCHLORIDE 1 MG/1
2 CAPSULE ORAL
Status: DISCONTINUED | OUTPATIENT
Start: 2024-06-21 | End: 2024-07-05 | Stop reason: HOSPADM

## 2024-06-21 RX ORDER — FUROSEMIDE 20 MG/1
20 TABLET ORAL DAILY
Status: DISCONTINUED | OUTPATIENT
Start: 2024-06-21 | End: 2024-07-05 | Stop reason: HOSPADM

## 2024-06-21 RX ORDER — CEPHALEXIN 250 MG/1
500 CAPSULE ORAL EVERY 12 HOURS SCHEDULED
Status: DISCONTINUED | OUTPATIENT
Start: 2024-06-21 | End: 2024-06-25

## 2024-06-21 RX ORDER — TRAZODONE HYDROCHLORIDE 50 MG/1
50 TABLET ORAL
Status: DISCONTINUED | OUTPATIENT
Start: 2024-06-21 | End: 2024-07-05 | Stop reason: HOSPADM

## 2024-06-21 RX ORDER — DIVALPROEX SODIUM 500 MG/1
1000 TABLET, DELAYED RELEASE ORAL
Status: DISCONTINUED | OUTPATIENT
Start: 2024-06-21 | End: 2024-06-28

## 2024-06-21 RX ORDER — NYSTATIN 100000 [USP'U]/G
POWDER TOPICAL 2 TIMES DAILY
Status: DISCONTINUED | OUTPATIENT
Start: 2024-06-21 | End: 2024-07-05 | Stop reason: HOSPADM

## 2024-06-21 RX ORDER — ATORVASTATIN CALCIUM 10 MG/1
10 TABLET, FILM COATED ORAL
Status: DISCONTINUED | OUTPATIENT
Start: 2024-06-21 | End: 2024-07-05 | Stop reason: HOSPADM

## 2024-06-21 RX ORDER — DIVALPROEX SODIUM 500 MG/1
1000 TABLET, EXTENDED RELEASE ORAL
Status: DISCONTINUED | OUTPATIENT
Start: 2024-06-21 | End: 2024-06-21

## 2024-06-21 RX ORDER — AMMONIUM LACTATE 12 G/100G
LOTION TOPICAL 2 TIMES DAILY
Status: DISCONTINUED | OUTPATIENT
Start: 2024-06-21 | End: 2024-07-05 | Stop reason: HOSPADM

## 2024-06-21 RX ORDER — PANTOPRAZOLE SODIUM 20 MG/1
20 TABLET, DELAYED RELEASE ORAL DAILY
Status: DISCONTINUED | OUTPATIENT
Start: 2024-06-21 | End: 2024-07-05 | Stop reason: HOSPADM

## 2024-06-21 RX ADMIN — TRAZODONE HYDROCHLORIDE 50 MG: 50 TABLET ORAL at 21:07

## 2024-06-21 RX ADMIN — GABAPENTIN 100 MG: 100 CAPSULE ORAL at 21:07

## 2024-06-21 RX ADMIN — PRAZOSIN HYDROCHLORIDE 2 MG: 1 CAPSULE ORAL at 21:07

## 2024-06-21 RX ADMIN — CEPHALEXIN 500 MG: 250 CAPSULE ORAL at 09:09

## 2024-06-21 RX ADMIN — NYSTATIN: 100000 POWDER TOPICAL at 21:08

## 2024-06-21 RX ADMIN — PANTOPRAZOLE SODIUM 20 MG: 20 TABLET, DELAYED RELEASE ORAL at 09:10

## 2024-06-21 RX ADMIN — GABAPENTIN 100 MG: 100 CAPSULE ORAL at 09:10

## 2024-06-21 RX ADMIN — Medication 3 MG: at 21:08

## 2024-06-21 RX ADMIN — HYDROXYZINE HYDROCHLORIDE 50 MG: 50 TABLET, FILM COATED ORAL at 13:55

## 2024-06-21 RX ADMIN — Medication 1000 UNITS: at 09:10

## 2024-06-21 RX ADMIN — CEPHALEXIN 500 MG: 250 CAPSULE ORAL at 21:07

## 2024-06-21 RX ADMIN — ATORVASTATIN CALCIUM 10 MG: 10 TABLET, FILM COATED ORAL at 21:07

## 2024-06-21 RX ADMIN — FUROSEMIDE 20 MG: 20 TABLET ORAL at 09:10

## 2024-06-21 RX ADMIN — LISINOPRIL 20 MG: 20 TABLET ORAL at 09:10

## 2024-06-21 RX ADMIN — DIVALPROEX SODIUM 1000 MG: 500 TABLET, DELAYED RELEASE ORAL at 21:07

## 2024-06-21 RX ADMIN — GABAPENTIN 100 MG: 100 CAPSULE ORAL at 17:25

## 2024-06-21 RX ADMIN — RISPERIDONE 3 MG: 2 TABLET, FILM COATED ORAL at 10:07

## 2024-06-21 RX ADMIN — RISPERIDONE 3 MG: 2 TABLET, FILM COATED ORAL at 17:24

## 2024-06-21 NOTE — PROGRESS NOTES
06/21/24 1047   Activity/Group Checklist   Group Admission/Discharge  (self assessment)   Attendance Attended   Attendance Duration (min) 0-15   Interactions Interacted appropriately   Affect/Mood Appropriate   Goals Achieved Identified feelings;Identified triggers;Discussed coping strategies;Able to listen to others;Able to engage in interactions;Able to reflect/comment on own behavior;Able to self-disclose;Able to recieve feedback     Patient was agreeable to meet and complete self assessment with [facility].  Patient information from form can be found in media.

## 2024-06-21 NOTE — ED NOTES
Pt sent to psych facility with paperwork and belongings.  Transported via special delivery.      Jesu Jo RN  06/20/24 9733
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
## 2024-06-21 NOTE — NURSING NOTE
Patient was observed in the dining area this morning for breakfast.  She is pleasant and calm during staff interactions.  Continues to endorse severe anxiety and depression.  Denies SI, HI and hallucinations while on the unit. Leydi was medication compliant this morning.  Denies any pain. Continuous safety rounding in progress.

## 2024-06-21 NOTE — NURSING NOTE
[facility]  Patient ambulated on to the unit with steady gait.  She is alert and oriented x 4.  Denies any pain.  She states she came to the ED because she is not getting along with other residents at her group home; she states residents and staff laugh at her.  She informs she felt like hurting herself and took one staple and used it to make scratches on her arm. She endorses high anxiety and depression, denies HI and hallucinations. She states she feels safe here but states she would walk in front of a moving car if she were not here on the unit.      Leydi was cooperative with the admission process.  She was able to sign paperwork and reconcile her PTA/  home medication list with this writer.  She did refuse scheduled Melatonin for tonight, stating she was tired and would be able to sleep on her own without it.     Skin assessment performed by two nurses, ABDI and SC with the following observations made:  left forearm with superficial scratches (2 band-aids on left forearm that patient wanted to keep intact) with no drainage, abdominal and groin folds with light red excoriation and mild odor present, b/l feet are dry with callous-like areas present.    Patient reports LBM 6/19/2024.  Leydi was started on Keflex for a UTI for which she currently denies any symptoms; denies burning on urination, denies urinary frequency. She requested fresh paper pants and shirt which were provided.  Declines shower tonight, stating she would like to go to sleep.  Patient was shown to her  where she is currently in bed, appears to be sleeping. Continuous safety rounding in progress.

## 2024-06-21 NOTE — MALNUTRITION/BMI
This medical record reflects one or more clinical indicators suggestive of malnutrition and/or morbid obesity.  Findings:  Adult BMI Classifications: Morbid Obesity 50-59.9     Energy intake > energy output over time.     Body mass index is 51.12 kg/m².     See Nutrition note dated 6/21/2024 for additional details.  Completed nutrition assessment is viewable in the nutrition documentation.

## 2024-06-21 NOTE — SOCIAL WORK
CM placed call to [facility]  to notify of PT admission. Left message requested return call.     ROSE placed call to [facility] . Spoke with Krysten. Updated on PT status and plan of care. Krysten in agreement with all. Krysten indicated that PT reason was behavioral for not getting something preferred. Krysten will update team on information provided in call. Provided contact information. Call ended mutually.     ROSE placed call to PCP with [facility]  to notify of PT admission. Left message requesting return call.

## 2024-06-21 NOTE — H&P
Leydi Khan  :1970 F  MRN:[id number]    CSN:[id number]  Adm Date: 2024  11:41 PM   ATT PHY: [facility]  [facility]         Chief Complaint: anxiety, depression, suicidal ideations      History of Presenting Illness: Leydi Khan is a(n) 54 y.o. year old female who is admitted to [facility] on voluntary 201 commitment basis.  Patient originally presented to [facility] ED on 2024 for worsening anxiety and depression with suicidal ideations.     Patient examined at bedside.  Patient currently denies any physical complaints.     Allergies   Allergen Reactions    Fish Oil - Food Allergy Other (See Comments)     unknown    Fish-Derived Products - Food Allergy Hives     Current Facility-Administered Medications on File Prior to Encounter   Medication Dose Route Frequency Provider Last Rate Last Admin    [DISCONTINUED] acetaminophen (TYLENOL) tablet 650 mg  650 mg Oral Q4H PRN Yoanna Lopez PA-C        [DISCONTINUED] atorvastatin (LIPITOR) tablet 10 mg  10 mg Oral HS Yoanna Lopez PA-C   10 mg at 24    [DISCONTINUED] benztropine (COGENTIN) tablet 0.5 mg  0.5 mg Oral Daily Yoanna Lopez PA-C        [DISCONTINUED] cephalexin (KEFLEX) capsule 500 mg  500 mg Oral Q12H FEDE Yoanna Lopez PA-C   500 mg at 24    [DISCONTINUED] clonazePAM (KlonoPIN) tablet 0.5 mg  0.5 mg Oral BID Yoanna Lopez PA-C   0.5 mg at 24 1739    [DISCONTINUED] desvenlafaxine succinate (PRISTIQ) 24 hr tablet 100 mg  100 mg Oral Daily Yoanna oLpez PA-C        [DISCONTINUED] divalproex sodium (DEPAKOTE) DR tablet 1,000 mg  1,000 mg Oral HS Yoanna Lopez PA-C   1,000 mg at 244    [DISCONTINUED] escitalopram (LEXAPRO) tablet 10 mg  10 mg Oral Daily Yoanna Lopez PA-C        [DISCONTINUED] furosemide (LASIX) tablet 20 mg  20  mg Oral Daily Yoanna Lopez PA-C        [DISCONTINUED] gabapentin (NEURONTIN) capsule 100 mg  100 mg Oral TID Yoanna Lopez PA-C   100 mg at 06/20/24 2110    [DISCONTINUED] ibuprofen (MOTRIN) tablet 600 mg  600 mg Oral Q6H PRN Yoanna Lopez PA-C        [DISCONTINUED] lisinopril (ZESTRIL) tablet 20 mg  20 mg Oral Daily Yoanna Lopez PA-C        [DISCONTINUED] pantoprazole (PROTONIX) EC tablet 20 mg  20 mg Oral Daily Yoanna Lopez PA-C        [DISCONTINUED] risperiDONE (RisperDAL) tablet 3 mg  3 mg Oral BID Yoanna Lopez PA-C   3 mg at 06/20/24 1739    [DISCONTINUED] traZODone (DESYREL) tablet 50 mg  50 mg Oral HS Yoanna Lopez PA-C   50 mg at 06/20/24 2110     Current Outpatient Medications on File Prior to Encounter   Medication Sig Dispense Refill    atorvastatin (LIPITOR) 10 mg tablet Take 1 tablet (10 mg total) by mouth daily at bedtime 30 tablet 0    cephalexin (KEFLEX) 500 mg capsule Take 500 mg by mouth every 12 (twelve) hours      clonazePAM (KlonoPIN) 0.5 mg tablet Take 0.5 mg by mouth 2 (two) times a day as needed for anxiety      divalproex sodium (DEPAKOTE) 500 mg DR tablet Take 2 tablets (1,000 mg total) by mouth daily at bedtime 60 tablet 0    docusate sodium (COLACE) 100 mg capsule Take 1 capsule (100 mg total) by mouth 2 (two) times a day as needed for constipation 60 capsule 5    furosemide (LASIX) 20 mg tablet Take 1 tablet (20 mg total) by mouth daily 90 tablet 0    gabapentin (NEURONTIN) 100 mg capsule Take 1 capsule (100 mg total) by mouth 3 (three) times a day 90 capsule 0    Incontinence Supply Disposable (Depend Adjustable Underwear) MISC Use as needed (as needed) Depends 3XL 1 each 3    lisinopril (ZESTRIL) 20 mg tablet Take 1 tablet (20 mg total) by mouth daily 30 tablet 0    loratadine (CLARITIN) 10 mg tablet Take 1 tablet (10 mg total) by mouth daily (Patient taking differently: Take 10 mg by mouth daily) 30 tablet 0     melatonin 3 mg Take 1 tablet (3 mg total) by mouth daily at bedtime 30 tablet 0    risperiDONE (RisperDAL) 3 mg tablet Take 1 tablet (3 mg total) by mouth 2 (two) times a day 60 tablet 0    traZODone (DESYREL) 50 mg tablet Take 1 tablet (50 mg total) by mouth daily at bedtime 30 tablet 0    acetaminophen (TYLENOL) 325 mg tablet Take 2 tablets (650 mg total) by mouth every 4 (four) hours as needed for mild pain 90 tablet 0    ammonium lactate (LAC-HYDRIN) 12 % lotion Apply topically 2 (two) times a day (Patient not taking: Reported on 3/28/2024) 225 g 0    benztropine (COGENTIN) 0.5 mg tablet  (Patient not taking: Reported on 6/20/2024)      bisacodyl (DULCOLAX) 5 mg EC tablet Bowel Prep: four (4) 5 mg tablets as directed (Patient not taking: Reported on 6/20/2024) 4 tablet 0    Cholecalciferol 100 MCG (4000 UT) CAPS Take 1 capsule (4,000 Units total) by mouth daily 90 capsule 1    clonazePAM (KlonoPIN) 1 mg tablet Take 1 mg by mouth 2 (two) times a day (Patient not taking: Reported on 6/20/2024)      desvenlafaxine succinate (PRISTIQ) 100 mg 24 hr tablet Take 1 tablet (100 mg total) by mouth daily 30 tablet 0    Diclofenac Sodium (VOLTAREN) 1 % Apply 2 g topically 4 (four) times a day as needed (pain) (Patient not taking: Reported on 6/20/2024) 350 g 0    ergocalciferol (VITAMIN D2) 50,000 units Take 1 capsule (50,000 Units total) by mouth once a week for 11 doses 10 capsule 0    escitalopram (LEXAPRO) 10 mg tablet Take 10 mg by mouth daily      ibuprofen (MOTRIN) 600 mg tablet Take 1 tablet (600 mg total) by mouth every 6 (six) hours as needed for headaches, fever or moderate pain 60 tablet 0    lidocaine (LIDODERM) 5 % Apply 1 patch topically over 12 hours daily as needed (Daily PRN) Remove & Discard patch within 12 hours or as directed by MD (Patient not taking: Reported on 6/21/2024) 30 patch 0    loperamide (IMODIUM) 2 mg capsule  (Patient not taking: Reported on 3/28/2024)      loperamide (IMODIUM) 2 mg  capsule Take 1 capsule (2 mg total) by mouth 4 (four) times a day as needed for diarrhea (Patient not taking: Reported on 6/21/2024) 30 capsule 0    meclizine (ANTIVERT) 12.5 MG tablet Take 1 tablet (12.5 mg total) by mouth every 8 (eight) hours as needed for dizziness 30 tablet 0    nicotine polacrilex (NICORETTE) 2 mg gum Chew 1 each (2 mg total) every 2 (two) hours as needed for smoking cessation (Patient not taking: Reported on 6/21/2024) 100 each 0    ondansetron (ZOFRAN-ODT) 4 mg disintegrating tablet Take 1 tablet (4 mg total) by mouth every 6 (six) hours as needed for nausea or vomiting (Patient not taking: Reported on 6/21/2024) 12 tablet 0    pantoprazole (PROTONIX) 20 mg tablet Take 1 tablet (20 mg total) by mouth daily 30 tablet 0    polyethylene glycol (MiraLax) 17 GM/SCOOP powder Take 238 g by mouth once for 1 dose Take 238 g my mouth. Use as directed 238 g 0    prazosin (MINIPRESS) 2 mg capsule Take 1 capsule (2 mg total) by mouth daily at bedtime 30 capsule 0     Active Ambulatory Problems     Diagnosis Date Noted    Primary hypertension 08/09/2016    Hyperlipidemia 08/09/2016    Class 3 severe obesity due to excess calories without serious comorbidity in adult ([facility]) 01/09/2021    Chronic midline low back pain without sciatica 01/09/2021    PTSD (post-traumatic stress disorder) 01/09/2021    Borderline personality disorder ([facility])     Seizures ([facility]) 04/27/2022    Anemia 11/03/2023    Schizoaffective disorder, depressive type ([facility]) 03/28/2024    Bilateral leg edema 03/28/2024     Resolved Ambulatory Problems     Diagnosis Date Noted    Medical clearance for psychiatric admission 01/09/2021    Major depression with psychotic features ([facility]) 11/29/2020    Bacterial conjunctivitis of both eyes 01/09/2021    Vertigo 01/22/2021    Bipolar affective disorder, depressed, severe, with psychotic behavior ([facility]) 10/10/2021    Closed fracture of base of fifth metatarsal bone 11/14/2021    Seizures ([facility]) 12/06/2021  "   Left leg pain 12/18/2021    Medical clearance for psychiatric admission 04/27/2022    Hyponatremia 04/27/2022    Insomnia 05/06/2022    Constipation 05/06/2022    History of non-suicidal self-harm 07/29/2022    Acute cystitis without hematuria 10/07/2022    MDD (major depressive disorder), recurrent, severe, with psychosis ([facility]) 02/13/2024     Past Medical History:   Diagnosis Date    Anxiety     Bipolar 1 disorder ([facility])     Bipolar affective disorder, depressed, severe ([facility]) 10/10/2021    CAD (coronary artery disease)     Cognitive impairment     Depression     Dyslipidemia     GERD (gastroesophageal reflux disease)     Hypertension     Obesity     Psychiatric disorder     Psychiatric illness     Schizoaffective disorder ([facility])     Seizure ([facility])      Past Surgical History:   Procedure Laterality Date    APPENDECTOMY      PATIENT DENIES HAVING APPENDIX REMOVED    CHOLECYSTECTOMY      FOOT SURGERY Right      Social History:   Social History     Socioeconomic History    Marital status: Single     Spouse name: Not on file    Number of children: Not on file    Years of education: Not on file    Highest education level: Not on file   Occupational History    Not on file   Tobacco Use    Smoking status: Former     Current packs/day: 0.25     Average packs/day: 0.2 packs/day for 0.4 years (0.1 ttl pk-yrs)     Types: Cigarettes, Cigars     Start date: 2/5/2024    Smokeless tobacco: Never    Tobacco comments:     Pt stated \"smokes when stressed\"   Vaping Use    Vaping status: Never Used   Substance and Sexual Activity    Alcohol use: Not Currently     Comment: occasionally    Drug use: Not Currently    Sexual activity: Not on file   Other Topics Concern    Not on file   Social History Narrative    Not on file     Social Determinants of Health     Financial Resource Strain: Not on file   Food Insecurity: No Food Insecurity (3/28/2024)    Hunger Vital Sign     Worried About Running Out of Food in the Last Year: Never true     " Ran Out of Food in the Last Year: Never true   Transportation Needs: No Transportation Needs (3/28/2024)    PRAPARE - Transportation     Lack of Transportation (Medical): No     Lack of Transportation (Non-Medical): No   Physical Activity: Not on file   Stress: Not on file   Social Connections: Socially Isolated (7/26/2021)    Received from [facility], [facility]    Social Connection and Isolation Panel [NHANES]     Frequency of Communication with Friends and Family: More than three times a week     Frequency of Social Gatherings with Friends and Family: Never     Attends Worship Services: Never     Active Member of Clubs or Organizations: No     Attends Club or Organization Meetings: Never     Marital Status: Never    Intimate Partner Violence: Unknown (4/3/2021)    Received from [facility], [facility]    Intimate Partner Violence     Within the last year, have you been afraid of your partner, ex-partner or family member?: Not on file     Within the last year, have you been humiliated or emotionally abused in other ways by your partner, ex-partner or family member?: Not on file     Within the last year, have you been kicked, hit, slapped, or otherwise physically hurt by your partner, ex-partner or family member?: Not on file     Within the last year, have you been raped or forced to have any kind of sexual activity by your partner, ex-partner or family member?: Not on file   Housing Stability: Unknown (3/28/2024)    Housing Stability Vital Sign     Unable to Pay for Housing in the Last Year: No     Number of Times Moved in the Last Year: Not on file     Homeless in the Last Year: No     Family History:   Family History   Problem Relation Age of Onset    Kidney cancer Mother     Cerebral aneurysm Father      Review of Systems   Constitutional:  Negative for chills and fever.   HENT:  Negative for ear pain and sore throat.    Eyes:  Negative for pain  "and visual disturbance.   Respiratory:  Negative for cough and shortness of breath.    Cardiovascular:  Negative for chest pain and palpitations.   Gastrointestinal:  Negative for abdominal pain, constipation, diarrhea, nausea and vomiting.   Genitourinary:  Negative for difficulty urinating, dysuria and hematuria.   Musculoskeletal:  Positive for arthralgias and back pain.   Skin:  Negative for color change and rash.   Neurological:  Negative for dizziness and headaches.   Psychiatric/Behavioral:  Positive for dysphoric mood, hallucinations and suicidal ideas. The patient is nervous/anxious.    All other systems reviewed and are negative.    Physical Exam   Vitals: Blood pressure 129/76, pulse 88, temperature 97.7 °F (36.5 °C), temperature source Temporal, resp. rate 18, height 5' 4\" (1.626 m), weight 135 kg (297 lb 12.8 oz), SpO2 96%.,Body mass index is 51.12 kg/m².  Constitutional: Awake, alert, in no acute distress.  Head: Normocephalic and atraumatic.   Mouth/Throat: Oropharynx is clear and moist.    Eyes: Conjunctivae and EOM are normal.   Neck: Neck supple.   Cardiovascular: Normal rate, regular rhythm and normal heart sounds.    Pulmonary/Chest: Effort normal and breath sounds normal.   Abdominal: Soft. Bowel sounds are normal. There is no tenderness.   Neurological: No focal deficits.   Skin: Skin is warm and dry.     Assessment     Leydi Khan is a(n) 54 y.o. year old female with MDD.     Cardiac with hx of HTN, HLD.  Continue lisinopril 20 mg daily, atorvastatin 10 mg daily, Lasix 20 mg daily.  Seizure disorder.  Patient is on Depakote DR 1000 mg at bedtime.  DJD/OA/chronic low back pain.  Tylenol, lidocaine patch daily and Voltaren gel.  GERD.  Patient is on Protonix 20 mg daily.  Prediabetes.  Hgb A1c 5.7% on 6/11/24.  Lifestyle modifications.  Neuropathy involving bilateral wrist and arms.  Continue gabapentin 100 mg 3 times daily.  Vitamin D deficiency.  Patient is on vitamin D3 1000 units " daily.  Acute UTI.  Patient started on Keflex 500 q12h x 5 days in the ED.  Urine culture pending.   Psych with MDD.  This is being managed by the psych team.       Prognosis: Fair.    Discharge Plan: In progress.    Advanced Directives: I have discussed in detail with the patient the advanced directives. The patient does not have an appointed POA and does not have a living will.  Patient's emergency contact is her on, Sandhya Collins, whose number is [phone].  When discussing cardiac and pulmonary resuscitation efforts with the patient, the patient wishes to be full code.    I have spent more than 50 minutes gathering data, doing physical examination, and discussing the advanced directives, which was witnessed by caring staff.    The patient was discussed with Dr. Jarquin and he is in agreement with the above note.

## 2024-06-21 NOTE — TREATMENT TEAM
06/21/24 0104   Provider Notification   Reason for Communication Admission   Provider Name Dr. ELKE Pisano.   Provider Role Consulting physician   Method of Communication Other (Comment)  (via EPIC secure)   Response No new orders   Notification Time 0104   Shift Event Other (Comment)  (reporting C-SSRS scoring of low for lifetime & last contact; reporting completion/ reconciliation of PTA/ home med list.)     PTA/ Home medication list was reconciled with the patient.

## 2024-06-21 NOTE — NUTRITION
24 1536   Biochemical Data,Medical Tests, and Procedures   Biochemical Data/Medical Tests/Procedures Lab values reviewed;Meds reviewed   Labs (Comment)  creat:0.42, B, TRI, HDL:32, LDL:105, CBC WNL   Meds (Comment) lipitor, keflex, Vit D, depakote, lasix, neurontin, haldol, atarax, zestril, antivert, melatonin, inderal, desyrel   Nutrition-Focused Physical Exam   Nutrition-Focused Physical Exam Findings RN skin assessment reviewed;No skin issues documented   Nutrition-Focused Physical Exam Findings +1 BLE edema. LBM .   Medical-Related Concerns Anxiety     Bipolar 1 disorder (HCC)     Bipolar affective disorder, depressed, severe (HCC) 10/10/2021    Borderline personality disorder (HCC)     CAD (coronary artery disease)     Cognitive impairment     Depression     Dyslipidemia     GERD (gastroesophageal reflux disease)     Hypertension     Obesity     Psychiatric disorder     Psychiatric illness     PTSD (post-traumatic stress disorder)     Schizoaffective disorder (HCC)     Seizure (HCC)   Adequacy of Intake   Nutrition Modality PO   Feeding Route   PO Independent   Current PO Intake   Current Diet Order Regular diet thin liquids   Current Meal Intake %   Estimated calorie intake compared to estimated need Nutrient needs met.   PES Statement   Problem Clinical   Weight (3) Overweight/obesity NC-3.3   Overweight/ obesity specific to Obese, Class III (5)   Related to Energy intake>energy output over time   As evidenced by: BMI   Recommendations/Interventions   Malnutrition/BMI Present Yes   Adult BMI Classifications Morbid Obesity 50-59.9   Summary High BMI. Regular diet thin liquids. Meal completions 100%. Patient known to this writer from previous admission. Per notes is from a group home. Reports appetite is good. Provided 3 meals and snacks daily at her facility. No diet plan. States she enjoys Pepsi and is unwilling to cut soda out of her diet. #; #;  11/21/#; 4/10/#, 70#(31%) significant weight gain. Patient reports she feels her medication may be causing her weight gain. +1 BLE edema. LBM 6/19. Skin intact.   Interventions/Recommendations Continue current diet order   Education Assessment   Education Patient/caregiver not appropriate for education at this time   Patient Nutrition Goals   Goal Avoid weight gain   Goal Status Initiated   Timeframe to complete goal by next f/u   Nutrition Complexity Risk   Nutrition complexity level Low risk   Follow up date 07/05/24

## 2024-06-21 NOTE — ED NOTES
Insurance Authorization for admission:   Phone call placed to [facility].  Phone number:[phone].     Spoke to Diane clark.  Level of care: inpatient mental health 201.  Review on: discharge .   Authorization #: (for reference, may use [facility] representative name and time) - [id number].

## 2024-06-21 NOTE — PLAN OF CARE
Problem: Ineffective Coping  Goal: Cooperates with admission process  Description: Interventions:   - Complete admission process  Outcome: Completed   New admit as of 6/20/2024 at 2341
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
## 2024-06-21 NOTE — TREATMENT PLAN
TREATMENT PLAN REVIEW - Behavioral Health Leydi Khan 54 y.o. 1970 female MRN: [id number]    [facility] Room / Bed: [facility] 202/[facility] 202-01 Encounter: [id number]        Admit Date/Time:  6/20/2024 11:41 PM    Treatment Team:   MD Leydi Levine RN Paul F Klose, RN Loree Smith Pope, LPC Nicole L Saas Isabelle Kenna Veronda Sestok Tammy Morejovanni  [place] Galindo Laws, MS    Diagnosis: Principal Problem:    Schizoaffective disorder, depressive type ([facility])  Active Problems:    Primary hypertension    Hyperlipidemia    Class 3 severe obesity due to excess calories without serious comorbidity in adult (HCC)    Chronic midline low back pain without sciatica    PTSD (post-traumatic stress disorder)    Borderline personality disorder (HCC)    Seizures ([facility])    Anemia    Bilateral leg edema    Patient Strengths/Assets: ability for insight, adapts well, cooperative, communication skills, compliant with medication, negotiates basic needs      Patient Barriers/Limitations: chronic mental illness, intellectual disability    Short Term Goals: decrease in depressive symptoms, decrease in paranoid thoughts, decrease in psychotic symptoms    Long Term Goals: improvement in depression, free of suicidal thoughts, no self abusive behavior, resolution of psychotic symptoms, improved reality testing    Progress Towards Goals: starting psychiatric medications as prescribed, restarting psychiatric medications as prescribed    Recommended Treatment: medication management, patient medication education, group therapy, milieu therapy, continued Behavioral Health psychiatric evaluation/assessment process     Treatment Frequency: daily medication monitoring, group and milieu therapy daily, monitoring through interdisciplinary rounds, monitoring through weekly patient care conferences    Expected Discharge Date: 14 days - 7/5/2024    Discharge Plan: return to previous  living arrangement    Treatment Plan Created/Updated By: Jose Elias Aquino MD

## 2024-06-21 NOTE — NURSING NOTE
Patient is visible in milieu for meals and groups. Patient endorsed anxiety and depression throughout the day and expresses dislike of residency. Patient is compliant with medications. Patient did endorse passive SI no plan; colored pencils removed. Q 7 min safety checks maintained. Fall precautions maintained.

## 2024-06-21 NOTE — PROGRESS NOTES
06/21/24 1115   Team Meeting   Meeting Type Tx Team Meeting   Team Members Present   Team Members Present Physician;Nurse;   Physician Team Member Dr. Marietta MD   Nursing Team Member Stephy River, RN   Care Management Team Member Elif Laws, MS, NCC, LPC   Patient/Family Present   Patient Present Yes   Patient's Family Present No     PT met with TX team, reviewed TX plan and goals, all in agreement and signed.

## 2024-06-21 NOTE — PROGRESS NOTES
06/21/24 1355   Smith Anxiety Scale   Anxious Mood 3   Tension 3   Fears 3   Insomnia 0   Intellectual 3   Depressed Mood 3   Somatic Complaints: Muscular 0   Somatic Complaints: Sensory 0   Cardiovascular Symptoms 0   Respiratory Symptoms 0   Gastrointestinal Symptoms 0   Genitourinary Symptoms 0   Autonomic Symptoms 0   Behavior at Interview 3   Smith Anxiety Score 18     Patient complains of increased anxiety and depression, unable to redirect verbally, with exercise, diversion activity, food/fluids. PRN atarax 50 mg given as ordered. Remains on Q 7 min checks for safety and behaviors. Will monitor for prn effects.   [facility]. Because your confidence in your caregivers is very important to us, it is our commitment to always treat you and your family with courtesy and respect.  Our goal is to always answer any questions or worries or concerns you may have about the care you received If you have any suggestions for improvement or worries or concerns please do not hesitate to let us know.                                                                          Signature of  Patient Representative:             Signature of Discharge Nurse :     Date:    Time:

## 2024-06-21 NOTE — TREATMENT TEAM
06/21/24 0049   Provider Notification   Reason for Communication Admission   Provider Name Dr. Jarquin   Provider Role Attending physician   Method of Communication Other (Comment)  (via medical clipboard)   Response Waiting for response   Notification Time 0050   Shift Event Other (Comment)  (new admit; H&P; PTA med list completed with patient.)   Charting Type   Charting Type Admission   Neurological   Neuro (WDL) WDL   Level of Consciousness Alert/awake   Orientation Level Oriented X4   Cognition Follows commands   Speech Clear   Swallow Able to swallow solids and liquids without difficulty   [place] Coma Scale   Eye Opening 4   Best Verbal Response 5   Best Motor Response 6   [place] Coma Scale Score 15   HEENT   HEENT (WDL) WDL   Respiratory   Respiratory (WDL) WDL   Cardiac   Cardiac (WDL) WDL   Pain Assessment   Pain Assessment Tool 0-10   Pain Score No Pain   Peripheral Vascular   Edema Right lower extremity;Left lower extremity   RLE Edema +1   LLE Edema +1   Integumentary   Integumentary (WDL) X   Skin Color Appropriate for ethnicity   Skin Condition/Temp Warm;Dry   Skin Integrity Excoriation;Laceration   Skin Location left forearm superficial scratches  (abd & groin folds with excoriation, dry b/l feet)   2 RN Skin Assessment completed with AH, SC   Bedside Mobility Assessment Tool - BMAT   Level 1 - Sit and shake 1   Level 2 - Stretch and Point 1   Level 3 - Stand 1   Level 4 - Walk 1   BMAT Level 4   Gastrointestinal   Gastrointestinal (WDL) X   Abdomen Inspection Obese;Rounded   Bowel Sounds (All Quadrants) Normoactive;Present   Tenderness Soft;Nontender   Last BM Date 06/19/24   GI Symptoms None   Genitourinary   Genitourinary Symptoms Other (Comment)  (stress incontinence)   Anal/Rectal   Anal/Rectal (WDL) WDL   Psychosocial   Psychosocial (WDL) X   Patient Behaviors/Mood Calm;Cooperative   Needs Expressed Emotional   Ability to Express Feelings Able to express   Ability to Express Needs Able to  express   Ability to Express Thoughts Able to express   Ability to Understand Others Understands     Patient was able to complete the PTA/ home medication list with this writer.  Medical clipboard request made to provider to inform.

## 2024-06-22 PROCEDURE — 99232 SBSQ HOSP IP/OBS MODERATE 35: CPT | Performed by: PSYCHIATRY & NEUROLOGY

## 2024-06-22 RX ORDER — CYANOCOBALAMIN 1000 UG/ML
1000 INJECTION, SOLUTION INTRAMUSCULAR; SUBCUTANEOUS
Status: DISCONTINUED | OUTPATIENT
Start: 2024-06-22 | End: 2024-07-05 | Stop reason: HOSPADM

## 2024-06-22 RX ORDER — ERGOCALCIFEROL 1.25 MG/1
50000 CAPSULE ORAL WEEKLY
Status: DISCONTINUED | OUTPATIENT
Start: 2024-06-22 | End: 2024-07-05 | Stop reason: HOSPADM

## 2024-06-22 RX ADMIN — CEPHALEXIN 250 MG: 250 CAPSULE ORAL at 08:56

## 2024-06-22 RX ADMIN — Medication 1000 UNITS: at 08:57

## 2024-06-22 RX ADMIN — RISPERIDONE 3 MG: 2 TABLET, FILM COATED ORAL at 17:33

## 2024-06-22 RX ADMIN — FUROSEMIDE 20 MG: 20 TABLET ORAL at 08:56

## 2024-06-22 RX ADMIN — CYANOCOBALAMIN 1000 MCG: 1000 INJECTION, SOLUTION INTRAMUSCULAR; SUBCUTANEOUS at 17:30

## 2024-06-22 RX ADMIN — GABAPENTIN 100 MG: 100 CAPSULE ORAL at 08:57

## 2024-06-22 RX ADMIN — PANTOPRAZOLE SODIUM 20 MG: 20 TABLET, DELAYED RELEASE ORAL at 08:57

## 2024-06-22 RX ADMIN — ERGOCALCIFEROL 50000 UNITS: 1.25 CAPSULE, LIQUID FILLED ORAL at 17:33

## 2024-06-22 RX ADMIN — RISPERIDONE 3 MG: 2 TABLET, FILM COATED ORAL at 08:56

## 2024-06-22 RX ADMIN — GABAPENTIN 100 MG: 100 CAPSULE ORAL at 17:33

## 2024-06-22 RX ADMIN — GABAPENTIN 100 MG: 100 CAPSULE ORAL at 21:15

## 2024-06-22 RX ADMIN — DIVALPROEX SODIUM 1000 MG: 500 TABLET, DELAYED RELEASE ORAL at 21:15

## 2024-06-22 RX ADMIN — CEPHALEXIN 500 MG: 250 CAPSULE ORAL at 21:15

## 2024-06-22 RX ADMIN — TRAZODONE HYDROCHLORIDE 50 MG: 50 TABLET ORAL at 21:15

## 2024-06-22 RX ADMIN — Medication 1 APPLICATION: at 18:27

## 2024-06-22 RX ADMIN — LISINOPRIL 20 MG: 20 TABLET ORAL at 08:57

## 2024-06-22 RX ADMIN — ATORVASTATIN CALCIUM 10 MG: 10 TABLET, FILM COATED ORAL at 21:16

## 2024-06-22 RX ADMIN — Medication 3 MG: at 21:15

## 2024-06-22 RX ADMIN — PRAZOSIN HYDROCHLORIDE 2 MG: 1 CAPSULE ORAL at 21:15

## 2024-06-22 NOTE — PROGRESS NOTES
"Progress Note - Behavioral Health   Leydi Khan 54 y.o. female MRN: [id number]  Unit/Bed#: [facility] 202-01 Encounter: [id number]    Assessment & Plan   Principal Problem:    Schizoaffective disorder, depressive type (HCC)  Active Problems:    Primary hypertension    Hyperlipidemia    Class 3 severe obesity due to excess calories without serious comorbidity in adult (HCC)    Chronic midline low back pain without sciatica    PTSD (post-traumatic stress disorder)    Borderline personality disorder (HCC)    Seizures (HCC)    Anemia    Bilateral leg edema    Recommended Treatment:   Continue current psychotropic treatment regimen, no changes at this time  Continue with group therapy, milieu therapy and occupational therapy.    Continue frequent safety checks and vitals per unit protocol.    Case discussed with treatment team.  Risks, benefits and possible side effects of Medications: Risks, benefits, and possible side effects of medications have been explained to the patient, who verbalizes understanding.      ------------------------------------------------------------  Chief Complaint:  \" I have impulsive thoughts\"    Interval History: Per staff, patient has been under behavioral control while on the unit.  Patient reports that she has thoughts to hurt herself and she does not know why.  She states her thoughts are impulsive but she does not want to die.  She states that she is willing to accept help and is able to contract for safety.     Medication compliant.     Adverse effects of medications: Denies    Progress Toward Goals: slow improvement    Psychiatric Review of Systems:  Behavior over the last 24 hours: improved  Sleep: normal  Appetite: good  Medication side effects: none verbalized  ROS: Complete review of systems is negative except as noted above.    Vital signs in last 24 hours:  Temp:  [97.4 °F (36.3 °C)-98.5 °F (36.9 °C)] 97.6 °F (36.4 °C)  HR:  [85-97] 97  Resp:  [18] 18  BP: (114-123)/(58-79) " "122/79    Mental Status Evaluation:  Appearance:  drowsy, minimal eye contact, appears younger than stated age, casually dressed, and appropriate grooming and hygiene   Behavior:  calm, cooperative, and laying in bed   Attitude:  pleasant and cooperative   Speech:  soft, scant, and coherent   Mood:  \"fine\"   Affect:  constricted   Thought Process:  concrete   Thought Content: no verbalized delusions or overt paranoia, intrusive thoughts   Perceptual disturbances: no reported hallucinations and does not appear to be responding to internal stimuli at this time   Risk Potential: No active suicidal ideation, No active homicidal ideation, she continues to report intrusive thoughts to harm herself   Cognition: oriented to self and situation, appears to be below average intelligence, and cognition not formally tested   Insight:  Poor   Judgment: Poor     Current Medications:  Current Facility-Administered Medications   Medication Dose Route Frequency Provider Last Rate    acetaminophen  650 mg Oral Q4H PRN Zita Pisano MD      acetaminophen  650 mg Oral Q4H PRN Zita Pisano MD      acetaminophen  975 mg Oral Q6H PRN iZta Pisano MD      aluminum-magnesium hydroxide-simethicone  30 mL Oral Q4H PRN Zita Pisano MD      ammonium lactate   Topical BID Sarah L Dagnall, PA-C      atorvastatin  10 mg Oral HS Sarah L Dagnall, PA-C      cephalexin  500 mg Oral Q12H FEDE Sarah L Dagnall, PA-C      Cholecalciferol  1,000 Units Oral Daily Sarah L Dagnall, PA-C      divalproex sodium  1,000 mg Oral HS Sarah L Dagnall, PA-C      furosemide  20 mg Oral Daily Sarah L Dagnall, PA-C      gabapentin  100 mg Oral TID Sarah L Dagnall, PA-C      haloperidol lactate  5 mg Intramuscular Q4H PRN Max 4/day Zita Pisano MD      hydrOXYzine HCL  25 mg Oral Q6H PRN Max 4/day Zita Pisano MD      hydrOXYzine HCL  50 mg Oral Q6H PRN Max 4/day Zita Pisano MD      lisinopril  20 mg Oral Daily Sarah L " Daglanel, PA-C      meclizine  12.5 mg Oral Q8H PRN Sarah SAENZ Dagtamir, PA-C      melatonin  3 mg Oral HS Zita Pisano MD      nicotine polacrilex  4 mg Oral Q2H PRN Zita Pisano MD      nystatin   Topical BID Sarah L Dagnall, PA-C      pantoprazole  20 mg Oral Daily Sarah SAENZ Woody, JO      prazosin  2 mg Oral HS Jose Elias Aquino MD      propranolol  5 mg Oral Q8H PRN Zita Pisano MD      risperiDONE  0.25 mg Oral Q4H PRN Max 6/day Zita Pisano MD      risperiDONE  0.5 mg Oral Q4H PRN Max 3/day Zita Pisano MD      risperiDONE  1 mg Oral Q2H PRN Max 3/day Zita Pisano MD      risperiDONE  3 mg Oral BID Jose Elias Aquino MD      traZODone  50 mg Oral Q6H PRN Max 3/day Zita Pisano MD      traZODone  50 mg Oral HS Jose Elias Aquino MD         Behavioral Health Medications: all current active meds have been reviewed. Changes as in plan section above.    Laboratory results:  I have personally reviewed all pertinent laboratory/tests results.  Recent Results (from the past 48 hour(s))   ECG 12 lead    Collection Time: 06/20/24  3:11 PM   Result Value Ref Range    Ventricular Rate 100 BPM    Atrial Rate 100 BPM    SD Interval 184 ms    QRSD Interval 86 ms    QT Interval 342 ms    QTC Interval 441 ms    P Axis 60 degrees    QRS Axis 65 degrees    T Wave Axis 64 degrees   POCT alcohol breath test    Collection Time: 06/20/24  3:16 PM   Result Value Ref Range    EXTBreath Alcohol 0.00    CBC and differential    Collection Time: 06/20/24  3:26 PM   Result Value Ref Range    WBC 7.81 4.31 - 10.16 Thousand/uL    RBC 3.71 (L) 3.81 - 5.12 Million/uL    Hemoglobin 12.0 11.5 - 15.4 g/dL    Hematocrit 35.4 34.8 - 46.1 %    MCV 95 82 - 98 fL    MCH 32.3 26.8 - 34.3 pg    MCHC 33.9 31.4 - 37.4 g/dL    RDW 14.1 11.6 - 15.1 %    MPV 9.4 8.9 - 12.7 fL    Platelets 177 149 - 390 Thousands/uL   Comprehensive metabolic panel    Collection Time: 06/20/24  3:26 PM   Result Value Ref Range     Sodium 137 135 - 147 mmol/L    Potassium 3.7 3.5 - 5.3 mmol/L    Chloride 99 96 - 108 mmol/L    CO2 29 21 - 32 mmol/L    ANION GAP 9 4 - 13 mmol/L    BUN 14 5 - 25 mg/dL    Creatinine 0.58 (L) 0.60 - 1.30 mg/dL    Glucose 165 (H) 65 - 140 mg/dL    Calcium 9.5 8.4 - 10.2 mg/dL    AST 16 13 - 39 U/L    ALT 25 7 - 52 U/L    Alkaline Phosphatase 61 34 - 104 U/L    Total Protein 7.3 6.4 - 8.4 g/dL    Albumin 4.0 3.5 - 5.0 g/dL    Total Bilirubin 0.41 0.20 - 1.00 mg/dL    eGFR 105 ml/min/1.73sq m   TSH    Collection Time: 06/20/24  3:26 PM   Result Value Ref Range    TSH 3RD GENERATON 3.243 0.450 - 4.500 uIU/mL   Manual Differential(PHLEBS Do Not Order)    Collection Time: 06/20/24  3:26 PM   Result Value Ref Range    Segmented % 88 (H) 43 - 75 %    Lymphocytes % 9 (L) 14 - 44 %    Monocytes % 2 (L) 4 - 12 %    Eosinophils % 1 0 - 6 %    Basophils % 0 0 - 1 %    Absolute Neutrophils 6.87 1.85 - 7.62 Thousand/uL    Absolute Lymphocytes 0.70 0.60 - 4.47 Thousand/uL    Absolute Monocytes 0.16 0.00 - 1.22 Thousand/uL    Absolute Eosinophils 0.08 0.00 - 0.40 Thousand/uL    Absolute Basophils 0.00 0.00 - 0.10 Thousand/uL    Total Counted      RBC Morphology Normal     Platelet Estimate Adequate Adequate   Rapid drug screen, urine    Collection Time: 06/20/24  5:50 PM   Result Value Ref Range    Amph/Meth UR Negative Negative    Barbiturate Ur Negative Negative    Benzodiazepine Urine Negative Negative    Cocaine Urine Negative Negative    Methadone Urine Negative Negative    Opiate Urine Negative Negative    PCP Ur Negative Negative    THC Urine Negative Negative    Oxycodone Urine Negative Negative    Fentanyl Urine Negative Negative    HYDROCODONE URINE Negative Negative   UA w Reflex to Microscopic w Reflex to Culture    Collection Time: 06/20/24  5:50 PM    Specimen: Urine, Clean Catch   Result Value Ref Range    Color, UA Yellow     Clarity, UA Hazy     Specific [place], UA 1.020 1.005 - 1.030    pH, UA 7.0 4.5, 5.0, 5.5,  6.0, 6.5, 7.0, 7.5, 8.0    Leukocytes, UA Large (A) Negative    Nitrite, UA Negative Negative    Protein, UA Negative Negative mg/dl    Glucose, UA Negative Negative mg/dl    Ketones, UA Negative Negative mg/dl    Urobilinogen, UA 4.0 (A) <2.0 mg/dl mg/dl    Bilirubin, UA Negative Negative    Occult Blood, UA Negative Negative   Urine Microscopic    Collection Time: 06/20/24  5:50 PM   Result Value Ref Range    RBC, UA None Seen None Seen, 0-1, 1-2, 2-4, 0-5 /hpf    WBC, UA 10-20 (A) None Seen, 0-1, 1-2, 0-5, 2-4 /hpf    Epithelial Cells Occasional None Seen, Occasional /hpf    Bacteria, UA None Seen None Seen, Occasional /hpf    MUCUS THREADS None Seen None Seen   Urine culture    Collection Time: 06/20/24  5:50 PM    Specimen: Urine, Clean Catch   Result Value Ref Range    Urine Culture No Growth <1000 cfu/mL    Comprehensive metabolic panel    Collection Time: 06/21/24  5:28 AM   Result Value Ref Range    Sodium 137 135 - 147 mmol/L    Potassium 4.0 3.5 - 5.3 mmol/L    Chloride 98 96 - 108 mmol/L    CO2 30 21 - 32 mmol/L    ANION GAP 9 4 - 13 mmol/L    BUN 10 5 - 25 mg/dL    Creatinine 0.42 (L) 0.60 - 1.30 mg/dL    Glucose 114 65 - 140 mg/dL    Glucose, Fasting 114 (H) 65 - 99 mg/dL    Calcium 9.4 8.4 - 10.2 mg/dL    AST 14 13 - 39 U/L    ALT 23 7 - 52 U/L    Alkaline Phosphatase 55 34 - 104 U/L    Total Protein 7.0 6.4 - 8.4 g/dL    Albumin 4.0 3.5 - 5.0 g/dL    Total Bilirubin 0.51 0.20 - 1.00 mg/dL    eGFR 117 ml/min/1.73sq m   CBC and differential    Collection Time: 06/21/24  5:28 AM   Result Value Ref Range    WBC 6.94 4.31 - 10.16 Thousand/uL    RBC 3.81 3.81 - 5.12 Million/uL    Hemoglobin 12.2 11.5 - 15.4 g/dL    Hematocrit 37.1 34.8 - 46.1 %    MCV 97 82 - 98 fL    MCH 32.0 26.8 - 34.3 pg    MCHC 32.9 31.4 - 37.4 g/dL    RDW 14.3 11.6 - 15.1 %    MPV 9.7 8.9 - 12.7 fL    Platelets 173 149 - 390 Thousands/uL   Vitamin B12    Collection Time: 06/21/24  5:28 AM   Result Value Ref Range    Vitamin B-12 423  180 - 914 pg/mL   Folate    Collection Time: 06/21/24  5:28 AM   Result Value Ref Range    Folate 10.2 >5.9 ng/mL   Vitamin D 25 hydroxy    Collection Time: 06/21/24  5:28 AM   Result Value Ref Range    Vit D, 25-Hydroxy 13.6 (L) 30.0 - 100.0 ng/mL   Lipid panel    Collection Time: 06/21/24  5:28 AM   Result Value Ref Range    Cholesterol 168 See Comment mg/dL    Triglycerides 156 (H) See Comment mg/dL    HDL, Direct 32 (L) >=50 mg/dL    LDL Calculated 105 (H) 0 - 100 mg/dL    Non-HDL-Chol (CHOL-HDL) 136 mg/dl   Total Syphilis (IgG/IgM) Screening    Collection Time: 06/21/24  5:28 AM   Result Value Ref Range    Syphilis Total Antibody Non-reactive Non-Reactive   ECG 12 lead    Collection Time: 06/21/24  7:40 AM   Result Value Ref Range    Ventricular Rate 77 BPM    Atrial Rate 77 BPM    [place] Interval 176 ms    QRSD Interval 92 ms    QT Interval 380 ms    QTC Interval 430 ms    P [place] 19 degrees    QRS Axis 39 degrees    T Wave Axis 53 degrees        This note has been constructed using a voice recognition system. There may be translation, syntax, or grammatical errors. If you have any questions, please contact the dictating provider.

## 2024-06-22 NOTE — NURSING NOTE
Patient is visible on unit, medication compliant, labile, having passive SI, took spork from dinner and grazed left arm with it, states she has thoughts of banging her head as well. Patient sitting near nurse's station for extra safety checks. Encouraging patient to use coping skills. Patient endorses moderate anxiety, moderate depression and passive SI, denies hallucinations and HI. Patient stated she is not sure why she feels like this. Continued care and continual safety rounds are in progress.

## 2024-06-22 NOTE — NURSING NOTE
Patient visible on the unit. Calm and cooperative.  Denies SI, HI, and hallucinations,. Reports mild anxiety and depression. Compliant with medications. Able to make needs known. Continuous rounding maintained. Will continue to monitor and access.

## 2024-06-22 NOTE — PLAN OF CARE
Problem: Ineffective Coping  Goal: Participates in unit activities  Description: Interventions:  - Provide therapeutic environment   - Provide required programming   - Redirect inappropriate behaviors   Outcome: Progressing  Goal: Free from restraint events  Description: - Utilize least restrictive measures   - Provide behavioral interventions   - Redirect inappropriate behaviors   Outcome: Progressing     Problem: Risk for Self Injury/Neglect  Goal: Refrain from harming self  Description: Interventions:  - Monitor patient closely, per order  - Develop a trusting relationship  - Supervise medication ingestion, monitor effects and side effects   Outcome: Progressing

## 2024-06-22 NOTE — PROGRESS NOTES
"Progress Note - Leydi Khan 54 y.o. female MRN: [id number]    Unit/Bed#: -01 Encounter: [id number]        Subjective:   Patient seen and examined at bedside after reviewing the chart and discussing the case with the caring staff.      Patient examined at bedside.  Patient has no acute issues.    On review of patient's labs it was found that patient's urine analysis showed evidence of UTI with large leukocytes but nitrites negative.  Patient vitamin D level was very low at 13.6 and B12 level is also low 423.  Urine culture is a still pending.    Physical Exam   Vitals: Blood pressure 122/79, pulse 97, temperature 97.6 °F (36.4 °C), temperature source Temporal, resp. rate 18, height 5' 4\" (1.626 m), weight 135 kg (297 lb 12.8 oz), SpO2 96%.,Body mass index is 51.12 kg/m².  Constitutional: He appears well-developed.   HEENT: PERR, EOMI, MMM  Cardiovascular: Normal rate and regular rhythm.    Pulmonary/Chest: Effort normal and breath sounds normal.   Abdomen: Soft, + BS, NT    Assessment/Plan:  Leydi Khan is a(n) 54 y.o. year old female with MDD.     Cardiac with hx of HTN, HLD.  Continue lisinopril 20 mg daily, atorvastatin 10 mg daily, Lasix 20 mg daily.  Seizure disorder.  Patient is on Depakote DR 1000 mg at bedtime.  DJD/OA/chronic low back pain.  Tylenol, lidocaine patch daily and Voltaren gel.  GERD.  Patient is on Protonix 20 mg daily.  Prediabetes.  Hgb A1c 5.7% on 6/11/24.  Lifestyle modifications.  Neuropathy involving bilateral wrist and arms.  Continue gabapentin 100 mg 3 times daily.  Vitamin D deficiency.  Patient is on vitamin D3 1000 units daily.  Acute UTI.  Patient started on Keflex 500 q12h x 5 days in the ED.  Urine culture pending.  New vitamin D deficiency.  I will put the patient on vitamin D bolus doses for 14 weeks followed by vitamin D3 1000 units daily.  New vitamin B12 deficiency.  I will put the patient on monthly B12 injections.  "

## 2024-06-23 PROCEDURE — 99232 SBSQ HOSP IP/OBS MODERATE 35: CPT | Performed by: PSYCHIATRY & NEUROLOGY

## 2024-06-23 RX ORDER — BISACODYL 10 MG
10 SUPPOSITORY, RECTAL RECTAL DAILY PRN
Status: DISCONTINUED | OUTPATIENT
Start: 2024-06-23 | End: 2024-07-05 | Stop reason: HOSPADM

## 2024-06-23 RX ORDER — MINERAL OIL 100 G/100G
1 OIL RECTAL ONCE
Status: DISCONTINUED | OUTPATIENT
Start: 2024-06-23 | End: 2024-06-23

## 2024-06-23 RX ORDER — AMOXICILLIN 250 MG
1 CAPSULE ORAL
Status: DISCONTINUED | OUTPATIENT
Start: 2024-06-23 | End: 2024-07-05 | Stop reason: HOSPADM

## 2024-06-23 RX ADMIN — GABAPENTIN 100 MG: 100 CAPSULE ORAL at 16:38

## 2024-06-23 RX ADMIN — ATORVASTATIN CALCIUM 10 MG: 10 TABLET, FILM COATED ORAL at 21:01

## 2024-06-23 RX ADMIN — Medication 3 MG: at 21:01

## 2024-06-23 RX ADMIN — RISPERIDONE 3 MG: 2 TABLET, FILM COATED ORAL at 09:42

## 2024-06-23 RX ADMIN — GABAPENTIN 100 MG: 100 CAPSULE ORAL at 21:01

## 2024-06-23 RX ADMIN — CEPHALEXIN 500 MG: 250 CAPSULE ORAL at 08:24

## 2024-06-23 RX ADMIN — DIVALPROEX SODIUM 1000 MG: 500 TABLET, DELAYED RELEASE ORAL at 21:01

## 2024-06-23 RX ADMIN — CEPHALEXIN 500 MG: 250 CAPSULE ORAL at 21:01

## 2024-06-23 RX ADMIN — PRAZOSIN HYDROCHLORIDE 2 MG: 1 CAPSULE ORAL at 21:00

## 2024-06-23 RX ADMIN — HYDROXYZINE HYDROCHLORIDE 50 MG: 50 TABLET, FILM COATED ORAL at 13:34

## 2024-06-23 RX ADMIN — PANTOPRAZOLE SODIUM 20 MG: 20 TABLET, DELAYED RELEASE ORAL at 08:24

## 2024-06-23 RX ADMIN — GABAPENTIN 100 MG: 100 CAPSULE ORAL at 08:24

## 2024-06-23 RX ADMIN — FUROSEMIDE 20 MG: 20 TABLET ORAL at 08:25

## 2024-06-23 RX ADMIN — RISPERIDONE 3 MG: 2 TABLET, FILM COATED ORAL at 18:12

## 2024-06-23 RX ADMIN — LISINOPRIL 20 MG: 20 TABLET ORAL at 08:24

## 2024-06-23 RX ADMIN — SENNOSIDES AND DOCUSATE SODIUM 1 TABLET: 50; 8.6 TABLET ORAL at 21:22

## 2024-06-23 RX ADMIN — TRAZODONE HYDROCHLORIDE 50 MG: 50 TABLET ORAL at 21:01

## 2024-06-23 RX ADMIN — SENNOSIDES AND DOCUSATE SODIUM 1 TABLET: 50; 8.6 TABLET ORAL at 14:36

## 2024-06-23 NOTE — NURSING NOTE
Pt reports vague feelings of urges to hurt herself. When patient was asked if she was looking to get herself put on a 1:1 and if that is what she was trying to do, pt responded that is what she was trying to do. Encouraged client to take her medications as prescribed and to go to the day room where she can work with her coping skills, pt verbalized understanding. Pt placed at the nurses station for observation.

## 2024-06-23 NOTE — PROGRESS NOTES
"Progress Note - Leydi Khan 54 y.o. female MRN: [id number]    Unit/Bed#: [facility] 202-01 Encounter: [id number]        Subjective:   Patient seen and examined at bedside after reviewing the chart and discussing the case with the caring staff.      Patient examined at bedside.  Patient reports and claims that she had no bowel movement for the past 3 days.  Patient is requesting suppository.  It was also noticed that patient had superficial scratch marks on her left forearm when she self inflicted it with a plastic fork yesterday.    Patient vitamin D level was very low at 13.6 and B12 level is also low 423.  Urine culture is a still pending.    Physical Exam   Vitals: Blood pressure 115/60, pulse 87, temperature 97.5 °F (36.4 °C), temperature source Temporal, resp. rate 18, height 5' 4\" (1.626 m), weight 135 kg (297 lb 12.8 oz), SpO2 95%.,Body mass index is 51.12 kg/m².  Constitutional: He appears well-developed.   HEENT: PERR, EOMI, MMM  Cardiovascular: Normal rate and regular rhythm.    Pulmonary/Chest: Effort normal and breath sounds normal.   Abdomen: Soft, + BS, NT    Assessment/Plan:  Leydi Khan is a(n) 54 y.o. year old female with MDD.     Cardiac with hx of HTN, HLD.  Continue lisinopril 20 mg daily, atorvastatin 10 mg daily, Lasix 20 mg daily.  Seizure disorder.  Patient is on Depakote DR 1000 mg at bedtime.  DJD/OA/chronic low back pain.  Tylenol, lidocaine patch daily and Voltaren gel.  GERD.  Patient is on Protonix 20 mg daily.  Prediabetes.  Hgb A1c 5.7% on 6/11/24.  Lifestyle modifications.  Neuropathy involving bilateral wrist and arms.  Continue gabapentin 100 mg 3 times daily.  Vitamin D deficiency.  Patient is on vitamin D3 1000 units daily.  Acute UTI.  Patient started on Keflex 500 q12h x 5 days in the ED.  Urine culture pending.  New vitamin D deficiency.  I will put the patient on vitamin D bolus doses for 14 weeks followed by vitamin D3 1000 units daily.  New vitamin B12 deficiency.  I will put the " patient on monthly B12 injections.  Constipation.  I will put the patient on Senokot as daily along with Dulcolax suppository as needed.

## 2024-06-23 NOTE — NURSING NOTE
Patient pleasant, calm and cooperative, able to make needs known. Is visible on the module, social with peers, attends groups. Denies SI/HI/AVH, reports mild anxiety reduced with PRN, denies depression. Medication compliant. Will continue to monitor.

## 2024-06-23 NOTE — NURSING NOTE
Pt reports moderate anxiety with some reported increased sob, l/s cta. + cap refill, offered PRN, accepted.

## 2024-06-23 NOTE — PROGRESS NOTES
"Progress Note - Behavioral Health   Leydi Khan 54 y.o. female MRN: [id number]  Unit/Bed#: [facility] 202-01 Encounter: [id number]    Assessment & Plan   Principal Problem:    Schizoaffective disorder, depressive type (HCC)  Active Problems:    Primary hypertension    Hyperlipidemia    Class 3 severe obesity due to excess calories without serious comorbidity in adult (HCC)    Chronic midline low back pain without sciatica    PTSD (post-traumatic stress disorder)    Borderline personality disorder (HCC)    Seizures (HCC)    Anemia    Bilateral leg edema    Recommended Treatment:   Continue current psychotropic treatment regimen, no changes at this time  Patient placed on finger foods  Continue with group therapy, milieu therapy and occupational therapy.    Continue frequent safety checks and vitals per unit protocol.    Case discussed with treatment team.  Risks, benefits and possible side effects of Medications: Risks, benefits, and possible side effects of medications have been explained to the patient, who verbalizes understanding.      ------------------------------------------------------------  Chief Complaint:  \" I do not want to go back to the group home\"    Interval History: Per staff, patient during meal yesterday took her support and superficially cut herself.  When confronted by staff she stated that she felt alone and no one was watching her.  Patient reports that she was upset yesterday because she did not want to go back to the group home.  Leydi is observed most of the day socializing with peers and staff, watching TV, coloring, walking around the unit.  She has been bright on approach but when asked how she is doing she reports \"I am not good, I want to go to the group home where my boyfriend lives.\"  Patient was placed on finger foods and she has been apologizing to the staff on the unit for using the sport inappropriately.    Medication compliant.     Adverse effects of medications: Denies    Progress " "Toward Goals: slow improvement    Psychiatric Review of Systems:  Behavior over the last 24 hours: unchanged  Sleep: normal  Appetite: good  Medication side effects: none verbalized  ROS: Complete review of systems is negative except as noted above.    Vital signs in last 24 hours:  Temp:  [97.5 °F (36.4 °C)-98 °F (36.7 °C)] 97.5 °F (36.4 °C)  HR:  [87-88] 87  Resp:  [18] 18  BP: (115-134)/(60) 115/60    Mental Status Evaluation:  Appearance:  alert, good eye contact, appears younger than stated age, and marginal grooming/hygiene   Behavior:  calm, cooperative, and sitting comfortably   Attitude:  pleasant and cooperative   Speech:  spontaneous, scant, and coherent   Mood:  \"Not good\"   Affect:  mood-incongruent and euthymic   Thought Process:  [place]   Thought Content: no verbalized delusions or overt paranoia   Perceptual disturbances: no reported hallucinations and does not appear to be responding to internal stimuli at this time   Risk Potential: No active or passive suicidal or homicidal ideation was verbalized during interview /she does report intrusive thoughts to self-harm -she contracts for safety when asked   Cognition: oriented to self and situation, appears to be below average intelligence, and cognition not formally tested   Insight:  Poor   Judgment: Poor     Current Medications:  Current Facility-Administered Medications   Medication Dose Route Frequency Provider Last Rate    acetaminophen  650 mg Oral Q4H PRN Zita Pisano MD      acetaminophen  650 mg Oral Q4H PRN Zita Pisano MD      acetaminophen  975 mg Oral Q6H PRN Zita Pisano MD      aluminum-magnesium hydroxide-simethicone  30 mL Oral Q4H PRN Zita Pisano MD      ammonium lactate   Topical BID Sarah LETTY Mckay PA-C      atorvastatin  10 mg Oral HS JASMYN Tsang-C      bisacodyl  10 mg Rectal Daily PRN Sudhir Jarquin MD      cephalexin  500 mg Oral Q12H FEDE Sarah Mckay PA-C      [START ON 9/21/2024] " Cholecalciferol  1,000 Units Oral Daily Sudhir Jarquin MD      cyanocobalamin  1,000 mcg Intramuscular Q30 Days Sudhir Jarquin MD      divalproex sodium  1,000 mg Oral HS Sarah L Dagnall, PA-C      ergocalciferol  50,000 Units Oral Weekly Sduhir Jarquin MD      furosemide  20 mg Oral Daily Sarah L Dagnall, PA-C      gabapentin  100 mg Oral TID Sarah L Dagnall, PA-C      haloperidol lactate  5 mg Intramuscular Q4H PRN Max 4/day Zita Pisano MD      hydrOXYzine HCL  25 mg Oral Q6H PRN Max 4/day Zita Pisano MD      hydrOXYzine HCL  50 mg Oral Q6H PRN Max 4/day Zita Pisano MD      lisinopril  20 mg Oral Daily Sarah L Dagnall, PA-C      meclizine  12.5 mg Oral Q8H PRN Sarah L Dagnall, PA-C      melatonin  3 mg Oral HS Zita Pisano MD      nicotine polacrilex  4 mg Oral Q2H PRN Zita Pisano MD      nystatin   Topical BID Sarah L Dagnall, PA-C      pantoprazole  20 mg Oral Daily Sarah L Dagnall, PA-C      prazosin  2 mg Oral HS Jose Elias Aquino MD      propranolol  5 mg Oral Q8H PRN Zita Pisano MD      risperiDONE  0.25 mg Oral Q4H PRN Max 6/day Zita Pisano MD      risperiDONE  0.5 mg Oral Q4H PRN Max 3/day Zita Pisano MD      risperiDONE  1 mg Oral Q2H PRN Max 3/day Zita Pisano MD      risperiDONE  3 mg Oral BID Jose Elias Aquino MD      senna-docusate sodium  1 tablet Oral HS Sudhir Jarquin MD      traZODone  50 mg Oral Q6H PRN Max 3/day Zita Pisano MD      traZODone  50 mg Oral HS Jose Elias Aquino MD         Behavioral Health Medications: all current active meds have been reviewed. Changes as in plan section above.    Laboratory results:  I have personally reviewed all pertinent laboratory/tests results.  No results found for this or any previous visit (from the past 48 hour(s)).     This note has been constructed using a voice recognition system. There may be translation, syntax, or grammatical errors. If you have any questions, please contact  the dictating provider.

## 2024-06-23 NOTE — NURSING NOTE
Pt condition unchanged since AM assessment. Continues to deny SI/HI/AVH,  reports mild anxiety, denies depression, will continue to monitor.

## 2024-06-23 NOTE — NURSING NOTE
Patient observed ar nurses station coloring. Sad and depressed. Endorses fleeting SI. Needs much reassurance. No reports of HI, or hallucinations. Moderate anxiety and depression. Participated in snack. Compliant with medications. Continuous rounding maintained.

## 2024-06-24 PROCEDURE — 99232 SBSQ HOSP IP/OBS MODERATE 35: CPT | Performed by: PSYCHIATRY & NEUROLOGY

## 2024-06-24 RX ADMIN — Medication: at 17:11

## 2024-06-24 RX ADMIN — GABAPENTIN 100 MG: 100 CAPSULE ORAL at 17:10

## 2024-06-24 RX ADMIN — CEPHALEXIN 500 MG: 250 CAPSULE ORAL at 08:54

## 2024-06-24 RX ADMIN — PANTOPRAZOLE SODIUM 20 MG: 20 TABLET, DELAYED RELEASE ORAL at 08:55

## 2024-06-24 RX ADMIN — PRAZOSIN HYDROCHLORIDE 2 MG: 1 CAPSULE ORAL at 21:31

## 2024-06-24 RX ADMIN — SENNOSIDES AND DOCUSATE SODIUM 1 TABLET: 50; 8.6 TABLET ORAL at 21:31

## 2024-06-24 RX ADMIN — LISINOPRIL 20 MG: 20 TABLET ORAL at 08:54

## 2024-06-24 RX ADMIN — Medication 3 MG: at 21:31

## 2024-06-24 RX ADMIN — Medication: at 08:56

## 2024-06-24 RX ADMIN — TRAZODONE HYDROCHLORIDE 50 MG: 50 TABLET ORAL at 21:31

## 2024-06-24 RX ADMIN — PROPRANOLOL HYDROCHLORIDE 5 MG: 10 TABLET ORAL at 14:26

## 2024-06-24 RX ADMIN — RISPERIDONE 3 MG: 2 TABLET, FILM COATED ORAL at 08:54

## 2024-06-24 RX ADMIN — ATORVASTATIN CALCIUM 10 MG: 10 TABLET, FILM COATED ORAL at 21:31

## 2024-06-24 RX ADMIN — NYSTATIN: 100000 POWDER TOPICAL at 08:56

## 2024-06-24 RX ADMIN — DIVALPROEX SODIUM 1000 MG: 500 TABLET, DELAYED RELEASE ORAL at 21:31

## 2024-06-24 RX ADMIN — RISPERIDONE 3 MG: 2 TABLET, FILM COATED ORAL at 17:10

## 2024-06-24 RX ADMIN — FUROSEMIDE 20 MG: 20 TABLET ORAL at 08:55

## 2024-06-24 RX ADMIN — GABAPENTIN 100 MG: 100 CAPSULE ORAL at 21:31

## 2024-06-24 RX ADMIN — CEPHALEXIN 500 MG: 250 CAPSULE ORAL at 21:31

## 2024-06-24 RX ADMIN — NYSTATIN: 100000 POWDER TOPICAL at 17:11

## 2024-06-24 RX ADMIN — GABAPENTIN 100 MG: 100 CAPSULE ORAL at 08:54

## 2024-06-24 NOTE — SOCIAL WORK
CM met with PT for PT check in. Reviewed plan for return to group home when stable and that she is not appropriate for [facility] at this time. Reviewed expectation and goals in place. Reviewed appropriate use of coping skills. PT presented with mood lability, much encouragement and redirection provided.

## 2024-06-24 NOTE — NURSING NOTE
Patient is visible in milieu for meals. Patient is compliant with medications. Patient presents as flat and depressed on approach. Pleasant and cooperative. Patient endorsed anxiety and depression. Endorsed urges of thoughts to harm self with no plan. Limited colored pencil and marker use. Communicated with BHTs. No acute behaviors noted. Q 7 min safety checks maintained. Fall precautions maintained.   Consent 3/Introductory Paragraph: I gave the patient a chance to ask questions they had about the procedure.  Following this I explained the Mohs procedure and consent was obtained. The risks, benefits and alternatives to therapy were discussed in detail. Specifically, the risks of infection, scarring, bleeding, prolonged wound healing, incomplete removal, allergy to anesthesia, nerve injury and recurrence were addressed. Prior to the procedure, the treatment site was clearly identified and confirmed by the patient. All components of Universal Protocol/PAUSE Rule completed.

## 2024-06-24 NOTE — PROGRESS NOTES
"Progress Note - Leydi Khan 54 y.o. female MRN: [id number]    Unit/Bed#: [facility]01 Encounter: [id number]        Subjective:   Patient seen and examined at bedside after reviewing the chart and discussing the case with the caring staff.      Patient examined at bedside.  Patient reports and claims that she had no bowel movement for the past 3 days.  Patient is requesting suppository.     Physical Exam   Vitals: Blood pressure 126/61, pulse 87, temperature 97.7 °F (36.5 °C), temperature source Temporal, resp. rate 18, height 5' 4\" (1.626 m), weight 135 kg (297 lb 12.8 oz), SpO2 95%.,Body mass index is 51.12 kg/m².  Constitutional: He appears well-developed.   HEENT: PERR, EOMI, MMM  Cardiovascular: Normal rate and regular rhythm.    Pulmonary/Chest: Effort normal and breath sounds normal.   Abdomen: Soft, + BS, NT    Assessment/Plan:  Leydi Khan is a(n) 54 y.o. year old female with MDD.     Cardiac with hx of HTN, HLD.  Continue lisinopril 20 mg daily, atorvastatin 10 mg daily, Lasix 20 mg daily.  Seizure disorder.  Patient is on Depakote DR 1000 mg at bedtime.  DJD/OA/chronic low back pain.  Tylenol, lidocaine patch daily and Voltaren gel.  GERD.  Patient is on Protonix 20 mg daily.  Prediabetes.  Hgb A1c 5.7% on 6/11/24.  Lifestyle modifications.  Neuropathy involving bilateral wrist and arms.  Continue gabapentin 100 mg 3 times daily.  Vitamin D deficiency.  Patient is on vitamin D3 1000 units daily.  Acute UTI.  Patient started on Keflex 500 q12h x 5 days in the ED.  Urine culture pending.  New vitamin D deficiency.  I will put the patient on vitamin D bolus doses for 14 weeks followed by vitamin D3 1000 units daily.  New vitamin B12 deficiency.  I will put the patient on monthly B12 injections.  Constipation.  I will put the patient on Senokot as daily along with Dulcolax suppository as needed.  "

## 2024-06-24 NOTE — PROGRESS NOTES
06/24/24 0930   Team Meeting   Meeting Type Daily Rounds   Team Members Present   Team Members Present Physician;Nurse;;Occupational Therapist   Physician Team Member Dr. Marietta MD; SHANI Rubio; Jose Elias Aquino MD   Nursing Team Member hSira Glez, RN   Care Management Team Member Elif Laws, MS, NCC, LPC   OT Team Member Selma Arrington, CTRS   Patient/Family Present   Patient Present No   Patient's Family Present No   Paper clothing, finger foods, no writing utensils in room due to self harm, passive si, scratches to left arm, limit setting, slept, will return to group home and follow up with [facility].

## 2024-06-24 NOTE — PLAN OF CARE
Problem: Ineffective Coping  Goal: Participates in unit activities  Description: Interventions:  - Provide therapeutic environment   - Provide required programming   - Redirect inappropriate behaviors   Outcome: Progressing     Problem: Risk for Self Injury/Neglect  Goal: Attend and participate in unit activities, including therapeutic, recreational, and educational groups  Description: Interventions:  - Provide therapeutic and educational activities daily, encourage attendance and participation, and document same in the medical record  - Obtain collateral information, encourage visitation and family involvement in care   Outcome: Progressing   Patient joins groups and remains having self harming thoughts over weekend. She is visible and interactive with staff and peers.

## 2024-06-24 NOTE — PROGRESS NOTES
"PROGRESS NOTE - BEHAVIORAL HEALTH  Leydi Khan 54 y.o. female MRN: [id number]   UNIT/BED#: [facility]01 ENCOUNTER: [id number]  DATE: 06/24/24  SUBJECTIVE    Per RN report, patient had 0 acute behavioral events. Patient had been observed to be asking for 1:1 and threatening to hurt herself superficially, requesting discharge to new group home where her boyfriend lives, cooperative with medication on the unit.     Leydi Khan was seen for continuation of care today. On examination, patient is lying in bed. Appears dysphoric, blunted affect. States that she is very depressed and would like to kill herself by bashing her head into the wall. Slow rate of speech and normal volume. States that she hears voices calling her a \"fat ugly bitch\" and that she has been feeling suicidal for the past two months. She endorses wanting to try ECT because she has become exhausted from her depressive symptoms.     BEHAVIOR: no change  SLEEP: slept through the night   APPETITE: eats meals to completion   MEDICATION SIDE EFFECTS: no side effects noted today    OBJECTIVE    MENTAL STATUS EXAM  APPEARANCE 53 y/o female, looks stated age, causally dressed, obese   BEHAVIOR Cooperative   SPEECH Normal   MOOD Depressed   AFFECT Constricted   THOUGHT PROCESS Standard associations, linear and goal directed   THOUGHT CONTENT Paranoid ideation   PERCEPTUAL DISTURBANCES Endorses AH, does appear internally preoccupied   RISK POTENTIAL Suicidal ideation - Yes with plan  Homicidal ideation - None   COGNITION Appears grossly to be below average    MEMORY Appears grossly intact   INSIGHT Poor   JUDGEMENT Poor   GAIT/STATION Normal   MOTOR ACTIVITY No EPS     VITAL SIGNS    Temp:  [97.5 °F (36.4 °C)-97.7 °F (36.5 °C)] 97.7 °F (36.5 °C)  HR:  [87-96] 96  Resp:  [18-20] 20  BP: (115-136)/(60-64) 136/64    VITAL SIGNS REVIEWED: yes    LABORATORY RESULTS    BMP  Lab Results   Component Value Date    SODIUM 137 06/21/2024    K 4.0 06/21/2024    CL 98 " "06/21/2024    CO2 30 06/21/2024    BUN 10 06/21/2024    CREATININE 0.42 (L) 06/21/2024    GLUC 114 06/21/2024    CALCIUM 9.4 06/21/2024        CBC  Lab Results   Component Value Date    WBC 6.94 06/21/2024    HGB 12.2 06/21/2024     06/21/2024        THYROID HORMONE   Lab Results   Component Value Date    UOS1SQJPGCZN 3.243 06/20/2024    TSH 1.80 07/08/2023        LIPID + DM   Lab Results   Component Value Date    CHOLESTEROL 168 06/21/2024    HDL 32 (L) 06/21/2024    TRIG 156 (H) 06/21/2024    NONHDLC 136 06/21/2024       Lab Results   Component Value Date    GLUC 114 06/21/2024    GLUC 165 (H) 06/20/2024    GLUC 208 (H) 06/12/2024     Lab Results   Component Value Date    HGBA1C 5.7 (H) 06/11/2024     No results found for: \"MICROALBUR\", \"[id number]\"     LIVER FUNCTION   Lab Results   Component Value Date    ALT 23 06/21/2024    AST 14 06/21/2024    ALKPHOS 55 06/21/2024      No results found for: \"HAV\", \"HEPAIGM\", \"HEPBIGM\", \"HEPBCAB\", \"HBEAG\", \"HEPCAB\"     SERUM DRUG LEVELS  No results found for: \"LITHIUM\"  No results found for: \"VALPROATE\"   No results found for: \"CLOZAPINE\"   No results found for: \"LAMOTRIGINE\"    URINE DRUG SCREEN  Lab Results   Component Value Date    BDZUR Negative 06/20/2024    COCAINEUR Negative 06/20/2024    METHADONEUR Negative 06/20/2024    OPIATEUR Negative 06/20/2024    THCUR Negative 06/20/2024    PCPUR Negative 06/20/2024    OXYCODONEUR Negative 06/20/2024       ASSESSMENT    No medication changes at this time. Will discuss further with treatment team.    Principal Problem:    Schizoaffective disorder, depressive type (HCC)  Active Problems:    Primary hypertension    Hyperlipidemia    Class 3 severe obesity due to excess calories without serious comorbidity in adult (HCC)    Chronic midline low back pain without sciatica    PTSD (post-traumatic stress disorder)    Borderline personality disorder (HCC)    Seizures (HCC)    Anemia    Bilateral leg edema      Current " Facility-Administered Medications   Medication Dose Route Frequency Provider Last Rate Last Admin    acetaminophen (TYLENOL) tablet 650 mg  650 mg Oral Q4H PRN Zita Pisano MD        acetaminophen (TYLENOL) tablet 650 mg  650 mg Oral Q4H PRN Zita Pisano MD        acetaminophen (TYLENOL) tablet 975 mg  975 mg Oral Q6H PRN Zita Pisano MD        aluminum-magnesium hydroxide-simethicone (MAALOX) oral suspension 30 mL  30 mL Oral Q4H PRN Zita Pisano MD        ammonium lactate (LAC-HYDRIN) 12 % lotion   Topical BID Sarah LETTY Daglanel, PA-C   1 Application at 06/22/24 1827    atorvastatin (LIPITOR) tablet 10 mg  10 mg Oral HS Sarah L Dagnall, PA-C   10 mg at 06/23/24 2101    bisacodyl (DULCOLAX) rectal suppository 10 mg  10 mg Rectal Daily PRN Sudhir Jarquin MD        cephalexin (KEFLEX) capsule 500 mg  500 mg Oral Q12H FEDE Sarah L Dagnall, PA-C   500 mg at 06/23/24 2101    [START ON 9/21/2024] Cholecalciferol (VITAMIN D3) tablet 1,000 Units  1,000 Units Oral Daily Sudhir Jarquin MD        cyanocobalamin injection 1,000 mcg  1,000 mcg Intramuscular Q30 Days Sudhir Jarquin MD   1,000 mcg at 06/22/24 1730    divalproex sodium (DEPAKOTE) DR tablet 1,000 mg  1,000 mg Oral HS Sarah L Dagnall, PA-C   1,000 mg at 06/23/24 2101    ergocalciferol (VITAMIN D2) capsule 50,000 Units  50,000 Units Oral Weekly Sudhir Jarquin MD   50,000 Units at 06/22/24 1733    furosemide (LASIX) tablet 20 mg  20 mg Oral Daily Sarah L Dagnall, PA-C   20 mg at 06/23/24 0825    gabapentin (NEURONTIN) capsule 100 mg  100 mg Oral TID Sarah L Dagnall, PA-C   100 mg at 06/23/24 2101    haloperidol lactate (HALDOL) injection 5 mg  5 mg Intramuscular Q4H PRN Max 4/day Zita Pisano MD        hydrOXYzine HCL (ATARAX) tablet 25 mg  25 mg Oral Q6H PRN Max 4/day Zita Pisano MD        hydrOXYzine HCL (ATARAX) tablet 50 mg  50 mg Oral Q6H PRN Max 4/day Zita Pisano MD   50 mg at 06/23/24 1334    lisinopril (ZESTRIL) tablet  20 mg  20 mg Oral Daily JASMYN Tsang-C   20 mg at 06/23/24 0824    meclizine (ANTIVERT) tablet 12.5 mg  12.5 mg Oral Q8H PRN JASMYN Tsang-C        melatonin tablet 3 mg  3 mg Oral HS Zita Pisano MD   3 mg at 06/23/24 2101    nicotine polacrilex (NICORETTE) gum 4 mg  4 mg Oral Q2H PRN Zita Pisano MD        nystatin (MYCOSTATIN) powder   Topical BID JASMYN Tsang-C   Given at 06/21/24 2108    pantoprazole (PROTONIX) EC tablet 20 mg  20 mg Oral Daily SarahJASMYN Farrar-C   20 mg at 06/23/24 0824    prazosin (MINIPRESS) capsule 2 mg  2 mg Oral HS Jose Elias Aquino MD   2 mg at 06/23/24 2100    propranolol (INDERAL) tablet 5 mg  5 mg Oral Q8H PRN Zita Pisano MD        risperiDONE (RisperDAL) tablet 0.25 mg  0.25 mg Oral Q4H PRN Max 6/day Zita Pisano MD        risperiDONE (RisperDAL) tablet 0.5 mg  0.5 mg Oral Q4H PRN Max 3/day Zita Pisano MD        risperiDONE (RisperDAL) tablet 1 mg  1 mg Oral Q2H PRN Max 3/day Zita Pisano MD        risperiDONE (RisperDAL) tablet 3 mg  3 mg Oral BID Jose Elias Aquino MD   3 mg at 06/23/24 1812    senna-docusate sodium (SENOKOT S) 8.6-50 mg per tablet 1 tablet  1 tablet Oral HS Sudhir Jarquin MD   1 tablet at 06/23/24 2122    traZODone (DESYREL) tablet 50 mg  50 mg Oral Q6H PRN Max 3/day Zita Pisano MD        traZODone (DESYREL) tablet 50 mg  50 mg Oral HS Jose Elias Aquino MD   50 mg at 06/23/24 2101       COORDINATION OF CARE  Patient's presentation on admission and proposed treatment plan discussed with treatment team.  Diagnosis, medication changes and treatment plan reviewed with patient.  All current active medications have been reviewed  Encourage group therapy, milieu therapy and occupational therapy  Behavioral Health checks every 7 minutes    DISCHARGE PLANNING: pending further improvement    CODE STATUS: Prior  ADVANCED DIRECTIVE AND LIVING WILL: <no information>    ORDERS REVIEWED: yes     Total  floor/unit time spent today 20 minutes. Greater than 50% of total time was spent with the patient and / or family counseling and / or coordination of care. A description of the counseling / coordination of care: medication education, treatment plan, supportive therapy.    Jose Elias Aquino M.D.  PGY-2, [facility] [place] [address]  [phone]

## 2024-06-25 PROCEDURE — 99232 SBSQ HOSP IP/OBS MODERATE 35: CPT | Performed by: PSYCHIATRY & NEUROLOGY

## 2024-06-25 RX ADMIN — PRAZOSIN HYDROCHLORIDE 2 MG: 1 CAPSULE ORAL at 21:00

## 2024-06-25 RX ADMIN — TRAZODONE HYDROCHLORIDE 50 MG: 50 TABLET ORAL at 21:00

## 2024-06-25 RX ADMIN — RISPERIDONE 3 MG: 2 TABLET, FILM COATED ORAL at 17:16

## 2024-06-25 RX ADMIN — GABAPENTIN 100 MG: 100 CAPSULE ORAL at 17:16

## 2024-06-25 RX ADMIN — GABAPENTIN 100 MG: 100 CAPSULE ORAL at 08:31

## 2024-06-25 RX ADMIN — NYSTATIN: 100000 POWDER TOPICAL at 08:33

## 2024-06-25 RX ADMIN — CEPHALEXIN 500 MG: 250 CAPSULE ORAL at 08:31

## 2024-06-25 RX ADMIN — ATORVASTATIN CALCIUM 10 MG: 10 TABLET, FILM COATED ORAL at 21:00

## 2024-06-25 RX ADMIN — Medication: at 08:33

## 2024-06-25 RX ADMIN — Medication 3 MG: at 21:00

## 2024-06-25 RX ADMIN — LISINOPRIL 20 MG: 20 TABLET ORAL at 08:31

## 2024-06-25 RX ADMIN — FUROSEMIDE 20 MG: 20 TABLET ORAL at 08:31

## 2024-06-25 RX ADMIN — DIVALPROEX SODIUM 1000 MG: 500 TABLET, DELAYED RELEASE ORAL at 21:00

## 2024-06-25 RX ADMIN — PANTOPRAZOLE SODIUM 20 MG: 20 TABLET, DELAYED RELEASE ORAL at 08:31

## 2024-06-25 RX ADMIN — HYDROXYZINE HYDROCHLORIDE 50 MG: 50 TABLET, FILM COATED ORAL at 21:00

## 2024-06-25 RX ADMIN — GABAPENTIN 100 MG: 100 CAPSULE ORAL at 21:00

## 2024-06-25 RX ADMIN — RISPERIDONE 3 MG: 2 TABLET, FILM COATED ORAL at 08:31

## 2024-06-25 RX ADMIN — SENNOSIDES AND DOCUSATE SODIUM 1 TABLET: 50; 8.6 TABLET ORAL at 21:00

## 2024-06-25 NOTE — PROGRESS NOTES
"Progress Note - Leydi Khan 54 y.o. female MRN: [id number]    Unit/Bed#: [facility]01 Encounter: [id number]        Subjective:   Patient seen and examined at bedside after reviewing the chart and discussing the case with the caring staff.      Patient examined at bedside.  Patient has no acute symptoms.     Physical Exam   Vitals: Blood pressure 123/58, pulse 77, temperature (!) 97.3 °F (36.3 °C), temperature source Temporal, resp. rate 16, height 5' 4\" (1.626 m), weight 135 kg (297 lb 12.8 oz), SpO2 94%.,Body mass index is 51.12 kg/m².  Constitutional: Patient in no acute distress.  HEENT: PERR, EOMI, MMM.  Cardiovascular: Normal rate and regular rhythm.    Pulmonary/Chest: Effort normal and breath sounds normal.   Abdomen: Soft, + BS, NT.    Assessment/Plan:  Leydi Khan is a(n) 54 y.o. year old female with MDD.     Cardiac with hx of HTN, HLD.  Continue lisinopril 20 mg daily, atorvastatin 10 mg daily, Lasix 20 mg daily.  Seizure disorder.  Patient is on Depakote DR 1000 mg at bedtime.  DJD/OA/chronic low back pain.  Tylenol, lidocaine patch daily and Voltaren gel.  GERD.  Patient is on Protonix 20 mg daily.  Prediabetes.  Hgb A1c 5.7% on 6/11/24.  Lifestyle modifications.  Neuropathy involving bilateral wrist and arms.  Continue gabapentin 100 mg 3 times daily.  Vitamin D deficiency.  Patient is on vitamin D3 1000 units daily.  Acute UTI.  Patient started on Keflex 500 q12h x 5 days in the ED.  Urine culture 6/20 showing no growth - d/c abx.  Vitamin D deficiency.  Patient started on vitamin D bolus doses for 14 weeks followed by vitamin D3 1000 units daily.  Vitamin B12 deficiency.  Patient started on monthly B12 injections.  Constipation.  Patient started on Senokot S daily.  Dulcolax suppository as needed.    The patient was discussed with Dr. Jarquin and he is in agreement with the above note.  "

## 2024-06-25 NOTE — SOCIAL WORK
CM met with PT for PT check in. PT was pleasant in conversation. Reviewed PT progress and verbal positive reinforcement was provided. PT denies si/hi/avh, reported mild anxiety and depression. Reassurance provided.

## 2024-06-25 NOTE — NURSING NOTE
Patient is visible in milieu for meals and group. Patient brightens in conversation. Patient denies SI/HI/AVH and has been 24 hours since feeling urges to harm self. Patient allowed spork for meals and changed from finger foods. Patient is compliant with medications. No acute behaviors noted. Q 7 min safety checks maintained. Fall precautions maintained.

## 2024-06-25 NOTE — NURSING NOTE
Pt was visible on the unit. Compliant with medication. Cooperative with staff during care. Endorsed mild to moderate anxiety and depression. Made no SI statement or thoughts of self harm. Denied HI, or AVH. Pt's affect was appropriate to circumstance. Eye contact was good. Speech pattern ws normal and relaxed. Appearance and hygiene was unremarkable. Made no c/o pain or discomfort. 7 minute safety checks continued.
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
## 2024-06-26 PROCEDURE — 99232 SBSQ HOSP IP/OBS MODERATE 35: CPT | Performed by: PSYCHIATRY & NEUROLOGY

## 2024-06-26 RX ORDER — KETOTIFEN FUMARATE 0.35 MG/ML
1 SOLUTION/ DROPS OPHTHALMIC 2 TIMES DAILY
Status: DISCONTINUED | OUTPATIENT
Start: 2024-06-26 | End: 2024-07-05 | Stop reason: HOSPADM

## 2024-06-26 RX ADMIN — HYDROXYZINE HYDROCHLORIDE 50 MG: 50 TABLET, FILM COATED ORAL at 19:54

## 2024-06-26 RX ADMIN — DIVALPROEX SODIUM 1000 MG: 500 TABLET, DELAYED RELEASE ORAL at 22:11

## 2024-06-26 RX ADMIN — GABAPENTIN 100 MG: 100 CAPSULE ORAL at 22:11

## 2024-06-26 RX ADMIN — SENNOSIDES AND DOCUSATE SODIUM 1 TABLET: 50; 8.6 TABLET ORAL at 22:10

## 2024-06-26 RX ADMIN — FUROSEMIDE 20 MG: 20 TABLET ORAL at 08:26

## 2024-06-26 RX ADMIN — Medication: at 17:10

## 2024-06-26 RX ADMIN — TRAZODONE HYDROCHLORIDE 50 MG: 50 TABLET ORAL at 22:10

## 2024-06-26 RX ADMIN — PRAZOSIN HYDROCHLORIDE 2 MG: 1 CAPSULE ORAL at 22:11

## 2024-06-26 RX ADMIN — ATORVASTATIN CALCIUM 10 MG: 10 TABLET, FILM COATED ORAL at 22:11

## 2024-06-26 RX ADMIN — KETOTIFEN FUMARATE 1 DROP: 0.25 SOLUTION/ DROPS OPHTHALMIC at 17:11

## 2024-06-26 RX ADMIN — Medication 3 MG: at 22:11

## 2024-06-26 RX ADMIN — RISPERIDONE 3 MG: 2 TABLET, FILM COATED ORAL at 08:25

## 2024-06-26 RX ADMIN — GABAPENTIN 100 MG: 100 CAPSULE ORAL at 08:25

## 2024-06-26 RX ADMIN — LISINOPRIL 20 MG: 20 TABLET ORAL at 08:26

## 2024-06-26 RX ADMIN — PANTOPRAZOLE SODIUM 20 MG: 20 TABLET, DELAYED RELEASE ORAL at 08:25

## 2024-06-26 RX ADMIN — RISPERIDONE 3 MG: 2 TABLET, FILM COATED ORAL at 17:09

## 2024-06-26 RX ADMIN — GABAPENTIN 100 MG: 100 CAPSULE ORAL at 17:10

## 2024-06-26 NOTE — NURSING NOTE
"Patient visible in milieu, mood and affect labile. Social with staff and select peers. Patient initially stated of having self harm ideation, no plan identified. Placed in paper clothing, finger foods initiated. Patient visible to staff throughout the day. Patient rates anxiety/depression as high, denies HI, hallucinations. Patient verbally aggressive towards peer who was sitting in \"her chair\". Patient redirected however threatened to \"bang head\". MD aware of behaviors. Remains medication compliant and on 7\" checks for safety and behaviors.   "
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
## 2024-06-26 NOTE — SOCIAL WORK
CM received voicemail from [facility] with [facility]. CM returned call  ext [phone]. CM returned call and left message requesting return call.

## 2024-06-26 NOTE — NURSING NOTE
Patient noted to be visible on the milieu. Patient denied SI/HI/AVH. Endorsed moderate depression and anxiety. Reports dislike and not feeling safe w/ her roommate. Presents as child-like w/ flat affect. Compliant w/ hs med regimen. Currently seated in recliner near nurses station. Q 7 min safety checks continuous and maintained.

## 2024-06-26 NOTE — PROGRESS NOTES
"Progress Note - Leydi Khan 54 y.o. female MRN: [id number]    Unit/Bed#: [facility] 202-01 Encounter: [id number]        Subjective:   Patient seen and examined at bedside after reviewing the chart and discussing the case with the caring staff.      Patient examined at bedside.  Patient complaining of eyes feeling itchy and watery for past few days.  Zaditor eye drops ordered.  She otherwise has no acute symptoms.    Physical Exam   Vitals: Blood pressure 122/61, pulse 79, temperature 97.8 °F (36.6 °C), temperature source Temporal, resp. rate 18, height 5' 4\" (1.626 m), weight 135 kg (297 lb 12.8 oz), SpO2 93%.,Body mass index is 51.12 kg/m².  Constitutional: Patient in no acute distress.  HEENT: PERR, EOMI, MMM.  Cardiovascular: Normal rate and regular rhythm.    Pulmonary/Chest: Effort normal and breath sounds normal.   Abdomen: Soft, + BS, NT.    Assessment/Plan:  Leydi Khan is a(n) 54 y.o. year old female with MDD.     Cardiac with hx of HTN, HLD.  Continue lisinopril 20 mg daily, atorvastatin 10 mg daily, Lasix 20 mg daily.  Seizure disorder.  Patient is on Depakote DR 1000 mg at bedtime.  DJD/OA/chronic low back pain.  Tylenol, lidocaine patch daily and Voltaren gel.  GERD.  Patient is on Protonix 20 mg daily.  Prediabetes.  Hgb A1c 5.7% on 6/11/24.  Lifestyle modifications.  Neuropathy involving bilateral wrist and arms.  Continue gabapentin 100 mg 3 times daily.  Vitamin D deficiency.  Patient started on vitamin D bolus doses for 14 weeks followed by vitamin D3 1000 units daily.  Vitamin B12 deficiency.  Patient started on monthly B12 injections.  Constipation.  Patient started on Senokot S daily.  Dulcolax suppository as needed.    The patient was discussed with Dr. Jarquin and he is in agreement with the above note.  " Yes

## 2024-06-26 NOTE — PROGRESS NOTES
06/26/24 0930   Team Meeting   Meeting Type Daily Rounds   Team Members Present   Team Members Present Physician;Nurse;;Occupational Therapist   Physician Team Member Dr. Marietta MD; SHANI Zafar; Jose Elias Aquino MD   Nursing Team Member Shira Glez, RN   Care Management Team Member Elif Laws, MS, NCC, LPC   OT Team Member Selma Arrington, CTRS   Patient/Family Present   Patient Present No   Patient's Family Present No   Slept, si no plan, high anxiety and depression, safety precaution put in place, exploring ect with provider. Will return to [facility] and follow up with [facility] for psych when stable.

## 2024-06-26 NOTE — PLAN OF CARE
Problem: Ineffective Coping  Goal: Free from restraint events  Description: - Utilize least restrictive measures   - Provide behavioral interventions   - Redirect inappropriate behaviors   Outcome: Progressing     Problem: Risk for Self Injury/Neglect  Goal: Refrain from harming self  Description: Interventions:  - Monitor patient closely, per order  - Develop a trusting relationship  - Supervise medication ingestion, monitor effects and side effects   Outcome: Progressing  Goal: Attend and participate in unit activities, including therapeutic, recreational, and educational groups  Description: Interventions:  - Provide therapeutic and educational activities daily, encourage attendance and participation, and document same in the medical record  - Obtain collateral information, encourage visitation and family involvement in care   Outcome: Progressing     Problem: Nutrition/Hydration-ADULT  Goal: Nutrient/Hydration intake appropriate for improving, restoring or maintaining nutritional needs  Description: Monitor and assess patient's nutrition/hydration status for malnutrition. Collaborate with interdisciplinary team and initiate plan and interventions as ordered.  Monitor patient's weight and dietary intake as ordered or per policy. Utilize nutrition screening tool and intervene as necessary. Determine patient's food preferences and provide high-protein, high-caloric foods as appropriate.     INTERVENTIONS:  - Monitor oral intake, urinary output, labs, and treatment plans  - Assess nutrition and hydration status and recommend course of action  - Evaluate amount of meals eaten  - Assist patient with eating if necessary   - Allow adequate time for meals  - Recommend/ encourage appropriate diets, oral nutritional supplements, and vitamin/mineral supplements  - Order, calculate, and assess calorie counts as needed  - Recommend, monitor, and adjust tube feedings and TPN/PPN based on assessed needs  - Assess need for  intravenous fluids  - Provide specific nutrition/hydration education as appropriate  - Include patient/family/caregiver in decisions related to nutrition  Outcome: Progressing

## 2024-06-26 NOTE — NURSING NOTE
Patient reports feeling increasingly anxious r/t issues w/ her roommate. Utilized prn Atrax 50 mg for bhakta anxiety score of 20. Will monitor and follow up for effectiveness.

## 2024-06-26 NOTE — PLAN OF CARE
Problem: Ineffective Coping  Goal: Participates in unit activities  Description: Interventions:  - Provide therapeutic environment   - Provide required programming   - Redirect inappropriate behaviors   Outcome: Progressing     Problem: Risk for Self Injury/Neglect  Goal: Attend and participate in unit activities, including therapeutic, recreational, and educational groups  Description: Interventions:  - Provide therapeutic and educational activities daily, encourage attendance and participation, and document same in the medical record  - Obtain collateral information, encourage visitation and family involvement in care   Outcome: Progressing   Patient joins groups but remains limited in her interactions; she continues to attention seek with staff.

## 2024-06-27 LAB — VALPROATE SERPL-MCNC: 46 UG/ML (ref 50–100)

## 2024-06-27 PROCEDURE — 97161 PT EVAL LOW COMPLEX 20 MIN: CPT

## 2024-06-27 PROCEDURE — 80164 ASSAY DIPROPYLACETIC ACD TOT: CPT

## 2024-06-27 PROCEDURE — 99232 SBSQ HOSP IP/OBS MODERATE 35: CPT | Performed by: PSYCHIATRY & NEUROLOGY

## 2024-06-27 RX ADMIN — GABAPENTIN 100 MG: 100 CAPSULE ORAL at 21:22

## 2024-06-27 RX ADMIN — Medication: at 08:05

## 2024-06-27 RX ADMIN — FUROSEMIDE 20 MG: 20 TABLET ORAL at 08:02

## 2024-06-27 RX ADMIN — LISINOPRIL 20 MG: 20 TABLET ORAL at 08:01

## 2024-06-27 RX ADMIN — GABAPENTIN 100 MG: 100 CAPSULE ORAL at 17:11

## 2024-06-27 RX ADMIN — Medication: at 21:34

## 2024-06-27 RX ADMIN — RISPERIDONE 3 MG: 2 TABLET, FILM COATED ORAL at 08:02

## 2024-06-27 RX ADMIN — DIVALPROEX SODIUM 1000 MG: 500 TABLET, DELAYED RELEASE ORAL at 21:22

## 2024-06-27 RX ADMIN — HYDROXYZINE HYDROCHLORIDE 25 MG: 25 TABLET ORAL at 13:32

## 2024-06-27 RX ADMIN — RISPERIDONE 3 MG: 2 TABLET, FILM COATED ORAL at 17:10

## 2024-06-27 RX ADMIN — TRAZODONE HYDROCHLORIDE 50 MG: 50 TABLET ORAL at 21:22

## 2024-06-27 RX ADMIN — KETOTIFEN FUMARATE 1 DROP: 0.25 SOLUTION/ DROPS OPHTHALMIC at 08:04

## 2024-06-27 RX ADMIN — ATORVASTATIN CALCIUM 10 MG: 10 TABLET, FILM COATED ORAL at 21:22

## 2024-06-27 RX ADMIN — Medication 3 MG: at 21:23

## 2024-06-27 RX ADMIN — GABAPENTIN 100 MG: 100 CAPSULE ORAL at 08:02

## 2024-06-27 RX ADMIN — KETOTIFEN FUMARATE 1 DROP: 0.25 SOLUTION/ DROPS OPHTHALMIC at 17:12

## 2024-06-27 RX ADMIN — PANTOPRAZOLE SODIUM 20 MG: 20 TABLET, DELAYED RELEASE ORAL at 08:02

## 2024-06-27 NOTE — PHYSICAL THERAPY NOTE
PHYSICAL THERAPY EVALUATION  NAME:  Leydi Khan  DATE: 06/27/24    AGE:   54 y.o.  Mrn:   [id number]  ADMIT DX:  Major depressive disorder, single episode, unspecified [F32.9]  Major depressive disorder [F32.9]  Problem List:   Patient Active Problem List   Diagnosis    Primary hypertension    Hyperlipidemia    Class 3 severe obesity due to excess calories without serious comorbidity in adult (HCC)    Chronic midline low back pain without sciatica    PTSD (post-traumatic stress disorder)    Borderline personality disorder (HCC)    Seizures (HCC)    Anemia    Schizoaffective disorder, depressive type (HCC)    Bilateral leg edema       Past Medical History  Past Medical History:   Diagnosis Date    Anxiety     Bipolar 1 disorder (HCC)     Bipolar affective disorder, depressed, severe (HCC) 10/10/2021    Borderline personality disorder (HCC)     CAD (coronary artery disease)     Cognitive impairment     Depression     Dyslipidemia     GERD (gastroesophageal reflux disease)     Hypertension     Obesity     Psychiatric disorder     Psychiatric illness     PTSD (post-traumatic stress disorder)     Schizoaffective disorder (HCC)     Seizure (HCC)        Past Surgical History  Past Surgical History:   Procedure Laterality Date    APPENDECTOMY      PATIENT DENIES HAVING APPENDIX REMOVED    CHOLECYSTECTOMY      FOOT SURGERY Right        Length Of Stay: 7  Performed at least 2 patient identifiers during session: Name and Birthday       06/27/24 1208   PT Last Visit   PT Visit Date 06/27/24   Note Type   Note type Evaluation   Pain Assessment   Pain Assessment Tool 0-10   Pain Score No Pain   Restrictions/Precautions   Weight Bearing Precautions Per Order No   Other Precautions   (decreased energy)   Home Living   Type of Home Group Home  ([facility])   Home Layout Two level;Able to live on main level with bedroom/bathroom;Performs ADLs on one level;Stairs to enter with rails  (3-5 MYESHA)   Bathroom Shower/Tub Walk-in  shower   Bathroom Toilet Standard   Bathroom Equipment Grab bars in shower   Home Equipment   (no AD used at baseline)   Prior Function   Level of [place] Independent with functional mobility;Needs assistance with IADLS;Needs assistance with ADLs   Lives With Facility staff   Receives Help From Personal care attendant   IADLs Family/Friend/Other provides transportation;Family/Friend/Other provides meals;Family/Friend/Other provides medication management   Falls in the last 6 months 0   Vocational On disability   General   Family/Caregiver Present No   Cognition   Overall Cognitive Status Impaired   Arousal/Participation Arousable   Orientation Level Oriented X4   Memory Decreased recall of recent events   Following Commands Follows one step commands without difficulty   RLE Assessment   RLE Assessment WFL   LLE Assessment   LLE Assessment WFL   Vision-Basic Assessment   Current Vision No visual deficits   Coordination   Sensation WFL   Transfers   Sit to Stand 5  Supervision   Additional items Armrests   Stand to Sit 5  Supervision   Additional items Armrests   Toilet transfer 5  Supervision   Additional items Standard toilet;Verbal cues  (cues to use grab bar)   Additional Comments pt reported dizziness with transitional movement however this resolved quickly   Ambulation/Elevation   Gait pattern Decreased foot clearance;Short stride;Excessively slow   Gait Assistance 5  Supervision   Additional items Verbal cues   Assistive Device None   Distance 100 and 15 ft   Ambulation/Elevation Additional Comments began with unsteay gait, using the walls to stabilize  however progressed to steadier gait as session progressed   Balance   Static Sitting Good   Dynamic Sitting Fair +   Static Standing Fair   Dynamic Standing Fair   Ambulatory Fair   Endurance Deficit   Endurance Deficit Yes   Endurance Deficit Description pt reported  feeling fatigue with activity   Activity Tolerance   Activity Tolerance Patient limited by  fatigue   Assessment   Prognosis Good   Assessment Pt is 54 y.o. female seen for PT evaluation s/p admit to [facility] on 6/20/2024 w/ Schizoaffective disorder, depressive type (HCC). PT consulted to assess pt's functional mobility and d/c needs. Order placed for PT eval and tx, w/ up and OOB as tolerated order. Pt agreeable to PT  session upon arrival, pt found seated OOB in recliner. The following objective measures performed on IE also reveal limitations: AM-PAC 6 Clicks 23/24.  Patient was independent/supervision w/ all functional mobility w/ no AD.   From PT/mobility standpoint, recommendation at time of d/c would be anticipate no needs/resources. Upon conclusion pt seated in chair. D/c PT services at this time. Complexity: Comorbidities affecting pt's physical performance at time of assessment include: obesity and ID, and schzioaffective disorder . Personal factors affecting pt at time of IE include: limited insight into impairments and steps to enter home. Please find objective findings from PT assessment regarding body systems outlined above with impairments and limitations including decreased endurance.  Pt's clinical presentation is currently evolving seen in pt's presentation of  low energy and engagement . The patient's AM-PAC Basic Mobility Inpatient Short Form Raw Score is 23. Please also refer to the recommendation of the Physical Therapist for safe discharge planning.   Barriers to Discharge None   Goals   Patient Goals to go to the bathroom   Discharge Recommendation   Rehab Resource Intensity Level, PT No post-acute rehabilitation needs   AM-PAC Basic Mobility Inpatient   Turning in Flat Bed Without Bedrails 4   Lying on Back to Sitting on Edge of Flat Bed Without Bedrails 4   Moving Bed to Chair 4   Standing Up From Chair Using Arms 4   Walk in Room 4   Climb 3-5 Stairs With Railing 3   Basic Mobility Inpatient Raw Score 23   Basic Mobility Standardized Score 50.88   [facility]  Level Of Mobility   JH-HLM Goal 7: Walk 25 feet or more   JH-HLM Achieved 7: Walk 25 feet or more       Time In: 1157  Time Out: 1208  Total Evaluation Minutes: 11    Salome Patel, PT

## 2024-06-27 NOTE — NURSING NOTE
"Patient expressing mild anxiety, unable to identify cause. Patient requested PRN Atarax. No outward signs of anxiety as she is currently sitting in recliner chair in [place] and writing. Atarax given per request for Smith score of 5. Remains on 7\" checks for safety and behaviors.   "

## 2024-06-27 NOTE — NURSING NOTE
"Pt visible on unit in the hallway. Pt is cooperative. Pt endorsed high anxiety and depression. Pt denies SI/HI/AVH. Pt states she did have thoughts of banging her head on the wall but she would not act on it because it would \"hurt.\" Pt verbalized she would come to staff if she felt unsafe. Continuous monitoring maintained for safety and behaviors.   "

## 2024-06-27 NOTE — PROGRESS NOTES
06/25/24 0930    Team Meeting   Meeting Type Daily Rounds   Team Members Present   Team Members Present Physician;Nurse;;Occupational Therapist   Physician Team Member Dr. Marietta MD; SHANI Rubio; Jose Elias Aquino MD   Nursing Team Member Shira Glez, RN   Care Management Team Member Elif Laws, MS, NCC, LPC   OT Team Member Selma Arrington, CTRS   Patient/Family Present   Patient Present No   Patient's Family Present No   Slept, no behaviors 24 hours. Moderate anxiety and depression, no prns, return regular utensils, ah. Will return to [facility] when stable and follow up with [facility].

## 2024-06-27 NOTE — PROGRESS NOTES
06/27/24 1225   Activity/Group Checklist   Group Admission/Discharge   Attendance Attended   Attendance Duration (min) 0-15   Interactions Interacted appropriately   Affect/Mood Appropriate   Goals Achieved Identified feelings;Identified triggers;Identified relapse prevention strategies;Discussed coping strategies;Discussed discharge plans;Able to listen to others;Able to engage in interactions;Able to reflect/comment on own behavior;Able to manage/cope with feelings;Able to self-disclose;Able to recieve feedback     Patient agreeable to meet and complete relapse prevention plan with CTRS.  Patient information from forms can be found in media.

## 2024-06-27 NOTE — PROGRESS NOTES
"Progress Note - Leydi Khan 54 y.o. female MRN: [id number]    Unit/Bed#: [facility] 202-01 Encounter: [id number]        Subjective:   Patient seen and examined at bedside after reviewing the chart and discussing the case with the caring staff.      Patient examined at bedside.  Patient has no acute symptoms.    Physical Exam   Vitals: Blood pressure 120/60, pulse 74, temperature 97.9 °F (36.6 °C), temperature source Temporal, resp. rate 18, height 5' 4\" (1.626 m), weight 135 kg (297 lb 12.8 oz), SpO2 99%.,Body mass index is 51.12 kg/m².  Constitutional: Patient in no acute distress.  HEENT: PERR, EOMI, MMM.  Cardiovascular: Normal rate and regular rhythm.    Pulmonary/Chest: Effort normal and breath sounds normal.   Abdomen: Soft, + BS, NT.    Assessment/Plan:  Leydi Khan is a(n) 54 y.o. year old female with MDD.     Cardiac with hx of HTN, HLD.  Continue lisinopril 20 mg daily, atorvastatin 10 mg daily, Lasix 20 mg daily.  Seizure disorder.  Patient is on Depakote DR 1000 mg at bedtime.  DJD/OA/chronic low back pain.  Tylenol, lidocaine patch daily and Voltaren gel.  GERD.  Patient is on Protonix 20 mg daily.  Prediabetes.  Hgb A1c 5.7% on 6/11/24.  Lifestyle modifications.  Neuropathy involving bilateral wrist and arms.  Continue gabapentin 100 mg 3 times daily.  Vitamin D deficiency.  Patient started on vitamin D bolus doses for 14 weeks followed by vitamin D3 1000 units daily.  Vitamin B12 deficiency.  Patient started on monthly B12 injections.  Constipation.  Patient started on Senokot S daily.  Dulcolax suppository as needed.    The patient was discussed with Dr. Jarquin and he is in agreement with the above note.  "

## 2024-06-27 NOTE — DISCHARGE INSTR - OTHER ORDERS
You are being discharged to your group home at [facility] located at [address] [address] , Phone: [phone].     Triggers you have identified during your hospitalization that led to your admission distressed mood, regression in mental health. Coping skills you have identified during your hospitalization include talking with others, music, coloring, writing, music, movies. If you are unable to deal with your distressed mood alone please contact a member of your group home staff at , your provider at [facility] at [phone] or your primary care provider with [facility] at . If that is not effective and you continue to have (ex: suicidal ideation, homicidal ideation, distressed mood, overwhelmed, in crisis) please contact (Crisis #) [facility] Crisis Hotline:  1-247.773.8665 , dial 911 or go to the nearest emergency center.      *[facility] Crisis Hotline:  1-445.939.1186   *[place] Suicide Prevention Lifeline:  1-449.281.3890  *Alcohol Anonymous: 402.975.4919  *[facility] Drug & Alcohol Commission: (277) 801-7068   *National [employer] on Mental Illness (MONIKA) HELPLINE: 616.976.8098/Website: www.monika.org  *Substance Abuse and Mental Health Services Administration([facility]) National Helpline, which is a confidential, free, 24-hour-a-day, 365-day-a-year, information service for individuals and family members facing mental health and/or substance use disorders. This service provides referrals to local treatment facilities, support groups, and community-based organizations. Callers can also order free publications and other information.  Call 1-566.566.9854/Website: www.[facility].gov  *United [employer] 2-1-1: This is a toll free, confidential, 24-hour-a-day service which connects you to a community  in your area who can help you find services and resources that are available to you locally and provide critical services that can improve and save lives.  Call:  211  /Website: http://www.[employer].org/       Franca, or Lilliana, our Behavioral Health Nurse Navigators, will be calling you after your discharge, on the phone number that you provided.  They will be available as an additional support, if needed.   If you wish to speak with one of them, you may contact Franca at [phone] or Lilliana at [phone].     [Fever] : no fever [Chills] : no chills [Chest Pain] : no chest pain [Palpitations] : no palpitations [Shortness Of Breath] : no shortness of breath [Dizziness] : no dizziness [Fainting] : no fainting

## 2024-06-27 NOTE — NURSING NOTE
"Patient with mild relief of anxiety S/P PRN Atarax. Currently socializing with staff and peers. Remains on 7\" checks for safety and behaviors.  "

## 2024-06-27 NOTE — PROGRESS NOTES
Progress Note - Behavioral Health   Leydi Khan 54 y.o. female MRN: [id number]  Unit/Bed#: [facility] 202-01 Encounter: [id number]    Assessment & Plan   Principal Problem:    Schizoaffective disorder, depressive type (HCC)  Active Problems:    Primary hypertension    Hyperlipidemia    Class 3 severe obesity due to excess calories without serious comorbidity in adult (HCC)    Chronic midline low back pain without sciatica    PTSD (post-traumatic stress disorder)    Borderline personality disorder (HCC)    Seizures (HCC)    Anemia    Bilateral leg edema      Behavior over the last 24 hours:  unchanged  Sleep: normal  Appetite: normal  Medication side effects: No  ROS: no complaints and all other systems are negative    Subjective: Leydi was seen today for psychiatric follow-up.  On assessment patient is calm and cooperative.  She is labile with mood with periods of self-injurious behaviors.  Patient reports high anxiety and depression, and intermittent AH of voices, telling her to bang her head on the wall, although she would not.  Her mood is controlled on the unit with no recent behavioral outburst.  She is compliant with the current medication regimen and unit rules.  She denied any SI/HI/AVH.  She did not appear internally preoccupied.    Mental Status Evaluation:  Appearance:  age appropriate and overweight   Behavior:  cooperative   Speech:  normal pitch and normal volume   Mood:  labile   Affect:  labile   Thought Process:  concrete   Associations: concrete associations   Thought Content:  ruminations   Perceptual Disturbances: Endorsed AH of voices telling her to bang her head   Risk Potential: Suicidal Ideations none at present  Homicidal Ideations none at present  Potential for Aggression No   Sensorium:  person, place, and time/date   Memory:  recent and remote memory grossly intact   Consciousness:  alert and awake    Attention: attention span appeared shorter than expected for age   Insight:  poor   Judgment:  poor   Gait/Station: normal gait/station   Motor Activity: no abnormal movements     Progress Toward Goals: Unchanged.  Patient is labile on the unit she endorses intermittent AH of voices telling her to bang her head although she would not.  Patient is able to contract for safety on the unit.  She is compliant with her current psychotropic medication regimen.  She denies side effects from current psychotropic medication regimen.  Will continue current psychotropic medication regimen.  No discharge date at this time.    Recommended Treatment: Continue with group therapy, milieu therapy and occupational therapy.      Risks, benefits and possible side effects of Medications:   Risks, benefits, and possible side effects of medications explained to patient and patient verbalizes understanding.      Medications: all current active meds have been reviewed.    Labs: I have personally reviewed all pertinent laboratory/tests results. Most Recent Labs:   Lab Results   Component Value Date    WBC 6.94 06/21/2024    RBC 3.81 06/21/2024    HGB 12.2 06/21/2024    HCT 37.1 06/21/2024     06/21/2024    RDW 14.3 06/21/2024    NEUTROABS 4.34 04/20/2024    SODIUM 137 06/21/2024    K 4.0 06/21/2024    CL 98 06/21/2024    CO2 30 06/21/2024    BUN 10 06/21/2024    CREATININE 0.42 (L) 06/21/2024    GLUC 114 06/21/2024    GLUF 114 (H) 06/21/2024    CALCIUM 9.4 06/21/2024    AST 14 06/21/2024    ALT 23 06/21/2024    ALKPHOS 55 06/21/2024    TP 7.0 06/21/2024    ALB 4.0 06/21/2024    TBILI 0.51 06/21/2024    CHOLESTEROL 168 06/21/2024    HDL 32 (L) 06/21/2024    TRIG 156 (H) 06/21/2024    LDLCALC 105 (H) 06/21/2024    NONHDLC 136 06/21/2024    VALPROICTOT 46 (L) 02/15/2024    [id number] 3.243 06/20/2024    FREET4 0.65 06/11/2024    PREGSERUM Negative 04/28/2022    RPR Non-Reactive 12/13/2022    HGBA1C 5.7 (H) 06/11/2024     06/11/2024       Counseling / Coordination of Care  Total floor / unit time spent today 25 minutes.

## 2024-06-27 NOTE — NURSING NOTE
"Patient visible in milieu, pleasant and cooperative in interaction. Social with staff and peers. Patient continues to endorse SI without plan willingly to staff. Patient remains in paper scrubs, on finger foods, and is visible to staff. No noted behaviors thus far. Endorses high anxiety/depression, denies HI, visual hallucinations. Patient states she is having auditory hallucinations of her ex-boyfriend saying \"He's out to get her\", support provided. Remains medication compliant and on 7\" checks for safety and behaviors.   "

## 2024-06-27 NOTE — TREATMENT TEAM
06/26/24 1954   Smith Anxiety Scale   Anxious Mood 2   Tension 2   Fears 2   Insomnia 1   Intellectual 2   Depressed Mood 3   Somatic Complaints: Muscular 2   Somatic Complaints: Sensory 2   Cardiovascular Symptoms 0   Respiratory Symptoms 0   Gastrointestinal Symptoms 0   Genitourinary Symptoms 0   Autonomic Symptoms 2   Behavior at Interview 2   Smith Anxiety Score 20     Pt administered Atarax 50 mg for moderate anxiety.

## 2024-06-28 PROCEDURE — 87081 CULTURE SCREEN ONLY: CPT

## 2024-06-28 PROCEDURE — 97166 OT EVAL MOD COMPLEX 45 MIN: CPT

## 2024-06-28 PROCEDURE — 99232 SBSQ HOSP IP/OBS MODERATE 35: CPT

## 2024-06-28 RX ADMIN — RISPERIDONE 3 MG: 2 TABLET, FILM COATED ORAL at 17:08

## 2024-06-28 RX ADMIN — GABAPENTIN 100 MG: 100 CAPSULE ORAL at 08:15

## 2024-06-28 RX ADMIN — FUROSEMIDE 20 MG: 20 TABLET ORAL at 08:15

## 2024-06-28 RX ADMIN — PRAZOSIN HYDROCHLORIDE 2 MG: 1 CAPSULE ORAL at 21:42

## 2024-06-28 RX ADMIN — RISPERIDONE 3 MG: 2 TABLET, FILM COATED ORAL at 08:14

## 2024-06-28 RX ADMIN — ATORVASTATIN CALCIUM 10 MG: 10 TABLET, FILM COATED ORAL at 21:42

## 2024-06-28 RX ADMIN — KETOTIFEN FUMARATE 1 DROP: 0.25 SOLUTION/ DROPS OPHTHALMIC at 08:16

## 2024-06-28 RX ADMIN — KETOTIFEN FUMARATE 1 DROP: 0.25 SOLUTION/ DROPS OPHTHALMIC at 17:09

## 2024-06-28 RX ADMIN — TRAZODONE HYDROCHLORIDE 50 MG: 50 TABLET ORAL at 21:42

## 2024-06-28 RX ADMIN — DIVALPROEX SODIUM 1250 MG: 250 TABLET, DELAYED RELEASE ORAL at 21:42

## 2024-06-28 RX ADMIN — Medication 1 APPLICATION: at 21:43

## 2024-06-28 RX ADMIN — SENNOSIDES AND DOCUSATE SODIUM 1 TABLET: 50; 8.6 TABLET ORAL at 21:42

## 2024-06-28 RX ADMIN — LISINOPRIL 20 MG: 20 TABLET ORAL at 08:15

## 2024-06-28 RX ADMIN — PANTOPRAZOLE SODIUM 20 MG: 20 TABLET, DELAYED RELEASE ORAL at 08:15

## 2024-06-28 RX ADMIN — Medication 3 MG: at 21:42

## 2024-06-28 RX ADMIN — GABAPENTIN 100 MG: 100 CAPSULE ORAL at 21:42

## 2024-06-28 RX ADMIN — GABAPENTIN 100 MG: 100 CAPSULE ORAL at 17:08

## 2024-06-28 RX ADMIN — Medication: at 08:16

## 2024-06-28 NOTE — PLAN OF CARE
Problem: Alteration in Thoughts and Perception  Goal: Treatment Goal: Gain control of psychotic behaviors/thinking, reduce/eliminate presenting symptoms and demonstrate improved reality functioning upon discharge  Outcome: Progressing  Goal: Refrain from acting on delusional thinking/internal stimuli  Description: Interventions:  - Monitor patient closely, per order   - Utilize least restrictive measures   - Set reasonable limits, give positive feedback for acceptable   - Administer medications as ordered and monitor of potential side effects  Outcome: Progressing  Goal: Agree to be compliant with medication regime, as prescribed and report medication side effects  Description: Interventions:  - Offer appropriate PRN medication and supervise ingestion; conduct AIMS, as needed   Outcome: Progressing  Goal: Recognize dysfunctional thoughts, communicate reality-based thoughts at the time of discharge  Description: Interventions:  - Provide medication and psycho-education to assist patient in compliance and developing insight into his/her illness   Outcome: Progressing     Problem: Risk for Self Injury/Neglect  Goal: Treatment Goal: Remain safe during length of stay, learn and adopt new coping skills, and be free of self-injurious ideation, impulses and acts at the time of discharge  Outcome: Progressing  Goal: Refrain from harming self  Description: Interventions:  - Monitor patient closely, per order  - Develop a trusting relationship  - Supervise medication ingestion, monitor effects and side effects   Outcome: Progressing  Goal: Attend and participate in unit activities, including therapeutic, recreational, and educational groups  Description: Interventions:  - Provide therapeutic and educational activities daily, encourage attendance and participation, and document same in the medical record  - Obtain collateral information, encourage visitation and family involvement in care   Outcome: Progressing  Goal:  Recognize maladaptive responses and adopt new coping mechanisms  Outcome: Progressing  Goal: Complete daily ADLs, including personal hygiene independently, as able  Description: Interventions:  - Observe, teach, and assist patient with ADLS  - Monitor and promote a balance of rest/activity, with adequate nutrition and elimination  Outcome: Progressing     Problem: Depression  Goal: Treatment Goal: Demonstrate behavioral control of depressive symptoms, verbalize feelings of improved mood/affect, and adopt new coping skills prior to discharge  Outcome: Progressing  Goal: Verbalize thoughts and feelings  Description: Interventions:  - Assess and re-assess patient's level of risk   - Engage patient in 1:1 interactions, daily, for a minimum of 15 minutes   - Encourage patient to express feelings, fears, frustrations, hopes   Outcome: Progressing  Goal: Refrain from isolation  Description: Interventions:  - Develop a trusting relationship   - Encourage socialization   Outcome: Progressing  Goal: Refrain from self-neglect  Outcome: Progressing     Problem: Anxiety  Goal: Anxiety is at manageable level  Description: Interventions:  - Assess and monitor patient's anxiety level.   - Monitor for signs and symptoms (heart palpitations, chest pain, shortness of breath, headaches, nausea, feeling jumpy, restlessness, irritable, apprehensive).   - Collaborate with interdisciplinary team and initiate plan and interventions as ordered.  - [place] patient to unit/surroundings  - Explain treatment plan  - Encourage participation in care  - Encourage verbalization of concerns/fears  - Identify coping mechanisms  - Assist in developing anxiety-reducing skills  - Administer/offer alternative therapies  - Limit or eliminate stimulants  Outcome: Progressing

## 2024-06-28 NOTE — PLAN OF CARE
Problem: OCCUPATIONAL THERAPY ADULT  Goal: Performs self-care activities at highest level of function for planned discharge setting.  See evaluation for individualized goals.  Description: Treatment Interventions: Equipment evaluation/education, ADL retraining       See flowsheet documentation for full assessment, interventions and recommendations.   Note: Limitation: Decreased ADL status, Decreased endurance  Prognosis: Good  Assessment: Pt is a 54 y.o. female seen for OT evaluation s/p admit to [facility] on 6/20/2024 w/ Schizoaffective disorder, depressive type (HCC).  Comorbidities affecting pt's functional performance at time of assessment include: HTN, hyperlipidemia, obesity, chronic midlow back pain, PTSD, borderline personality disorder, seizures, anemia, bilateral leg edema. Personal factors affecting pt at time of IE include:difficulty performing ADLS, difficulty performing IADLS , decreased initiation and engagement , and health management . Prior to admission, pt was I with ADLs and requires A with IADLs. Upon evaluation: the following deficits impact occupational performance: weakness, decreased strength, decreased balance, decreased tolerance, impaired problem solving, and decreased safety awareness. Pt to benefit from continued skilled OT tx while in the hospital to address deficits as defined above and maximize level of functional independence w ADL's and functional mobility. Occupational Performance areas to address include: grooming, bathing/shower, toilet hygiene, dressing, and clothing management. From OT standpoint, recommendation at time of d/c would with no post acute rehab needs.     Rehab Resource Intensity Level, OT: No post-acute rehabilitation needs     Angela Dimas MS, OTR/L

## 2024-06-28 NOTE — NURSING NOTE
Extensive 1 to 1 spent with patient.  Patient continues to feel unsafe on unit. Leydi stated that it is due to roommate talking to herself and walking around in room overnight.  Room changed. Agreed to come to staff is she again feels unsafe on unit. Rated anxiety high, depression mild. Currently resting quietly in bed without issue. Maintained on q 7 minute safety checks.

## 2024-06-28 NOTE — PROGRESS NOTES
06/27/24 0930   Team Meeting   Meeting Type Daily Rounds   Team Members Present   Team Members Present Physician;Nurse;;Occupational Therapist   Physician Team Member Dr. Marietta MD; SHANI Rubio   Nursing Team Member Lety Olmedo, JOSÉ MIGUEL   Care Management Team Member Elif Laws, MS, NCC, LPC   OT Team Member Selma Arrington, MARIS   Patient/Family Present   Patient Present No   Patient's Family Present No   High anxiety and depression, ah, paranoid, passive si, safety precautions in place, threats to nag head, prn, d/c next week. Will return to [facility] and follow up with [facility] for psych.

## 2024-06-28 NOTE — PROGRESS NOTES
06/28/24 1000    Team Meeting   Meeting Type Daily Rounds   Team Members Present   Team Members Present Physician;Nurse;Occupational Therapist;   Physician Team Member Dr. Anneliese MD; SHANI Rubio   Nursing Team Member Cortney Olmedo, JOSÉ MIGUEL   Care Management Team Member Elif Laws, MS, NCC, LPC   OT Team Member MARI TrinidadS   Patient/Family Present   Patient Present No   Patient's Family Present No   High depression, moderate anxiety, fleeting si-no plan, safety precautions, will return to [facility] and continue with [facility] for psych follow up.

## 2024-06-28 NOTE — SOCIAL WORK
CM met with PT for PT check in. PT was pleasant in conversation. Reviewed tx goals and the importance of PT participation. PT reflected she understood. Reviewed plan is to return to [facility] at time of discharge. PT remains fixated that she is not returning and that she will stay on [facility]. CM reviewed with PT that she is not appropriate for state and that she will return to group home where she can work with her community supports for alt living. PT reflected she understood. Reassurance provided.

## 2024-06-28 NOTE — NURSING NOTE
Adjusting well to room change. Sleep observed as sound during shift, no respiratory distress.  Continues on q 7 minute safety checks.  Clutter free environment continued to prevent falls. Fluids maintained at beside to promote hydration.  Will continue to monitor.

## 2024-06-28 NOTE — NURSING NOTE
"Patient visible in milieu, pleasant and cooperative in interaction. Social with staff and peers. Patient endorses high depression with moderate anxiety. Expresses SI without plan, patient stated she would come to staff if she felt like harming self. Visible in milieu, attended groups x 3. Denies HI, hallucinations. Remains medication compliant and on 7\" checks for safety and behaviors.   "

## 2024-06-28 NOTE — OCCUPATIONAL THERAPY NOTE
Occupational Therapy Evaluation     Patient Name: Leydi Khan  Today's Date: 6/28/2024  Problem List  Principal Problem:    Schizoaffective disorder, depressive type (HCC)  Active Problems:    Primary hypertension    Hyperlipidemia    Class 3 severe obesity due to excess calories without serious comorbidity in adult (HCC)    Chronic midline low back pain without sciatica    PTSD (post-traumatic stress disorder)    Borderline personality disorder (HCC)    Seizures (HCC)    Anemia    Bilateral leg edema    Past Medical History  Past Medical History:   Diagnosis Date    Anxiety     Bipolar 1 disorder ([facility])     Bipolar affective disorder, depressed, severe ([facility]) 10/10/2021    Borderline personality disorder ([facility])     CAD (coronary artery disease)     Cognitive impairment     Depression     Dyslipidemia     GERD (gastroesophageal reflux disease)     Hypertension     Obesity     Psychiatric disorder     Psychiatric illness     PTSD (post-traumatic stress disorder)     Schizoaffective disorder (HCC)     Seizure (HCC)      Past Surgical History  Past Surgical History:   Procedure Laterality Date    APPENDECTOMY      PATIENT DENIES HAVING APPENDIX REMOVED    CHOLECYSTECTOMY      FOOT SURGERY Right          06/28/24 1148   OT Last Visit   OT Visit Date 06/28/24   Note Type   Note type Evaluation   Pain Assessment   Pain Assessment Tool 0-10   Pain Score 10 - Worst Possible Pain   Pain Location/Orientation Orientation: Bilateral;Location: Shoulder   Pain Radiating Towards n/a   Pain Onset/Description Onset: Ongoing;Frequency: Intermittent   Effect of Pain on Daily Activities pericare and LB dressing as reported by patient   Patient's Stated Pain Goal No pain   Hospital Pain Intervention(s) Emotional support;Environmental changes   Multiple Pain Sites No   Restrictions/Precautions   Weight Bearing Precautions Per Order No   Braces or Orthoses   (none reported)   Other Precautions Pain   Home Living   Type of Home Group  "Home  ([facility])   Home Layout Two level;Able to live on main level with bedroom/bathroom;Performs ADLs on one level;Stairs to enter with rails  (3-5 MYESHA)   Bathroom Shower/Tub Walk-in shower   Bathroom Toilet Standard   Bathroom Equipment Grab bars in shower   Bathroom Accessibility Accessible   Home Equipment   (no AD used at baseline)   Prior Function   Level of [place] Independent with functional mobility;Needs assistance with IADLS;Needs assistance with ADLs   Lives With Facility staff   Receives Help From Personal care attendant   IADLs Family/Friend/Other provides transportation;Family/Friend/Other provides meals;Family/Friend/Other provides medication management   Falls in the last 6 months 0  (pt denies)   Vocational On disability   Subjective   Subjective \"I am having trouble wiping myself\"   ADL   Where Assessed Chair   UB Bathing Assistance 6  Modified Independent   LB Bathing Assistance 5  Supervision/Setup   UB Dressing Assistance 6  Modified independent   LB Dressing Assistance 5  Supervision/Setup   LB Dressing Deficit Use of adaptive equipment  (pt would benefit from education on appropriate AE)   Bed Mobility   Additional Comments DNT; pt seated in dining room chair upon arrival and conclusion of session   Transfers   Sit to Stand 5  Supervision   Additional items Armrests   Stand to Sit 5  Supervision   Additional items Armrests   Toilet transfer 5  Supervision   Additional items Standard toilet   Additional Comments no device used; denied dizziness with transitional movements on this date   Functional Mobility   Functional Mobility 5  Supervision   Additional Comments Pt completed short distance ADL mobility around hallway at S level w/ no device. No significant LOB observed, mild instability.   Additional items   (no device)   Balance   Static Sitting Good   Dynamic Sitting Fair +   Static Standing Fair   Dynamic Standing Fair   Ambulatory Fair   Activity Tolerance   Activity " Tolerance Patient limited by fatigue   Nurse Made Aware Yes, nursing staff made aware of session outcomes   RUE Assessment   RUE Assessment WFL   RUE Strength   RUE Overall Strength   (3+/5 grossly assessed)   LUE Assessment   LUE Assessment WFL   LUE Strength   LUE Overall Strength   (3+/5 grossly assessed)   Hand Function   Gross Motor Coordination Functional   Fine Motor Coordination Functional   Vision-Basic Assessment   Current Vision No visual deficits   Cognition   Overall Cognitive Status Impaired   Arousal/Participation Alert;Responsive   Attention Attends with cues to redirect   Orientation Level Oriented X4   Memory Decreased recall of recent events   Following Commands Follows one step commands without difficulty   Comments Pt agreeable to OT evaluation, pleasant   Assessment   Limitation Decreased ADL status;Decreased endurance   Prognosis Good   Assessment Pt is a 54 y.o. female seen for OT evaluation s/p admit to [facility] on 6/20/2024 w/ Schizoaffective disorder, depressive type (HCC).  Comorbidities affecting pt's functional performance at time of assessment include: HTN, hyperlipidemia, obesity, chronic midlow back pain, PTSD, borderline personality disorder, seizures, anemia, bilateral leg edema. Personal factors affecting pt at time of IE include:difficulty performing ADLS, difficulty performing IADLS , decreased initiation and engagement , and health management . Prior to admission, pt was I with ADLs and requires A with IADLs. Upon evaluation: the following deficits impact occupational performance: weakness, decreased strength, decreased balance, decreased tolerance, impaired problem solving, and decreased safety awareness. Pt to benefit from continued skilled OT tx while in the hospital to address deficits as defined above and maximize level of functional independence w ADL's and functional mobility. Occupational Performance areas to address include: grooming, bathing/shower, toilet hygiene,  dressing, and clothing management. From OT standpoint, recommendation at time of d/c would with no post acute rehab needs.   Goals   Patient Goals to be able to wipe self   Plan   Treatment Interventions Equipment evaluation/education;ADL retraining   Goal Expiration Date 07/18/24   OT Treatment Day 0   OT Frequency   (x1 follow-up with AE education)   Discharge Recommendation   Rehab Resource Intensity Level, OT No post-acute rehabilitation needs   Additional Comments  The patient's raw score on the AM-PAC Daily Activity inpatient short form is 21, standardized score is 44.27, greater than 39.4. Patients at this level are likely to benefit from discharge to home with no post acute rehab needs. Please refer to the recommendation of the Occupational Therapist for safe discharge planning.   AM-PAC Daily Activity Inpatient   Lower Body Dressing 3   Bathing 3   Toileting 3   Upper Body Dressing 4   Grooming 4   Eating 4   Daily Activity Raw Score 21   Daily Activity Standardized Score (Calc for Raw Score >=11) 44.27   AM-PAC Applied Cognition Inpatient   Following a Speech/Presentation 3   Understanding Ordinary Conversation 4   Taking Medications 3   Remembering Where Things Are Placed or Put Away 3   Remembering List of 4-5 Errands 2   Taking Care of Complicated Tasks 2   Applied Cognition Raw Score 17   Applied Cognition Standardized Score 36.52     GOALS:    Pt will achieve the following within specified time frame: LTG  Pt will achieve the following goals within 20 days    *ADL transfers with (I) for inc'd independence with ADLs/purposeful tasks    *UB ADL with (I) for inc'd independence with self cares    *LB ADL with (I) using AE prn for inc'd independence with self cares    *Toileting with (I) for clothing management and hygiene for return to PLOF with personal care    *Increase static stand balance and dyn stand balance to G for inc'd safety with standing purposeful tasks    *Increase stand tolerance x7 m for  inc'd tolerance with standing purposeful tasks    *Bed mobility- (I) for inc'd independence to manage own comfort and initiate EOB & OOB purposeful tasks    *Participate in 10-15m UE therex to increase overall stamina/activity tolerance for purposeful tasks      Angela Dimas MS, OTR/L

## 2024-06-28 NOTE — PROGRESS NOTES
Progress Note - Behavioral Health   Leydi Khan 54 y.o. female MRN: [id number]  Unit/Bed#: [facility] 204-02 Encounter: [id number]    Assessment & Plan   Principal Problem:    Schizoaffective disorder, depressive type (HCC)  Active Problems:    Primary hypertension    Hyperlipidemia    Class 3 severe obesity due to excess calories without serious comorbidity in adult (HCC)    Chronic midline low back pain without sciatica    PTSD (post-traumatic stress disorder)    Borderline personality disorder (HCC)    Seizures (HCC)    Anemia    Bilateral leg edema      Behavior over the last 24 hours:  unchanged  Sleep: normal  Appetite: normal  Medication side effects: No  ROS: no complaints and all other systems are negative    Subjective: Leydi was seen today for psychiatric follow-up.  Patient is calm withdrawn to her room laying in bed. Patient reports having thought to harm herself, however she would not act on the thoughts and she will be able to make staff know if thoughts become unbearable. Patient is controlled this morning with no recent behavioral outbursts. She is able to contract for safety. She is compliant with her current medication regimen. She denied any SI/HI.    Mental Status Evaluation:  Appearance:  age appropriate and overweight   Behavior:  cooperative   Speech:  normal pitch and normal volume   Mood:  labile   Affect:  labile   Thought Process:  concrete   Associations: concrete associations   Thought Content:  ruminations   Perceptual Disturbances: Endorsed thoughts to self harm, able to contract for safety   Risk Potential: Suicidal Ideations none at present  Homicidal Ideations none at present  Potential for Aggression No   Sensorium:  person, place, and time/date   Memory:  recent and remote memory grossly intact   Consciousness:  alert and awake    Attention: attention span appeared shorter than expected for age   Insight:  poor   Judgment: poor   Gait/Station: In bed   Motor Activity: no abnormal  movements     Progress Toward Goals: Unchanged.  Patient continues to endorse thoughts to self-harm, She is however, able to contract for safety on the unit. She is compliant with her current psychotropic medication regimen. She denied side effects from current psychotropic medication regimen. Will continue current psychotropic medication regimen. No discharge date at this time.    Depakote level on 06/27/2024 was 46 and subtherapeutic.  Depakote increased to thousand to 1,250 p.o. daily at bedtime.  Depakote level ordered for 7/1/2024 at 1800.                Recommended Treatment: Continue with group therapy, milieu therapy and occupational therapy.      Risks, benefits and possible side effects of Medications:   Risks, benefits, and possible side effects of medications explained to patient and patient verbalizes understanding.      Medications: all current active meds have been reviewed.    Labs: I have personally reviewed all pertinent laboratory/tests results. Most Recent Labs:   Lab Results   Component Value Date    WBC 6.94 06/21/2024    RBC 3.81 06/21/2024    HGB 12.2 06/21/2024    HCT 37.1 06/21/2024     06/21/2024    RDW 14.3 06/21/2024    NEUTROABS 4.34 04/20/2024    SODIUM 137 06/21/2024    K 4.0 06/21/2024    CL 98 06/21/2024    CO2 30 06/21/2024    BUN 10 06/21/2024    CREATININE 0.42 (L) 06/21/2024    GLUC 114 06/21/2024    GLUF 114 (H) 06/21/2024    CALCIUM 9.4 06/21/2024    AST 14 06/21/2024    ALT 23 06/21/2024    ALKPHOS 55 06/21/2024    TP 7.0 06/21/2024    ALB 4.0 06/21/2024    TBILI 0.51 06/21/2024    CHOLESTEROL 168 06/21/2024    HDL 32 (L) 06/21/2024    TRIG 156 (H) 06/21/2024    LDLCALC 105 (H) 06/21/2024    NONHDLC 136 06/21/2024    VALPROICTOT 46 (L) 06/27/2024    OPK9CPNTKQZT 3.243 06/20/2024    FREET4 0.65 06/11/2024    PREGSERUM Negative 04/28/2022    RPR Non-Reactive 12/13/2022    HGBA1C 5.7 (H) 06/11/2024     06/11/2024       Counseling / Coordination of Care  Total floor  / unit time spent today 25 minutes.

## 2024-06-28 NOTE — PROGRESS NOTES
"Progress Note - Leydi Khan 54 y.o. female MRN: [id number]    Unit/Bed#: [facility] 204-02 Encounter: [id number]        Subjective:   Patient seen and examined at bedside after reviewing the chart and discussing the case with the caring staff.      Patient examined at bedside.  Patient states she had leg swelling last night but this is improved now.    Physical Exam   Vitals: Blood pressure 122/66, pulse 86, temperature 97.6 °F (36.4 °C), temperature source Temporal, resp. rate 18, height 5' 4\" (1.626 m), weight 135 kg (297 lb 12.8 oz), SpO2 97%.,Body mass index is 51.12 kg/m².  Constitutional: Patient in no acute distress.  HEENT: PERR, EOMI, MMM.  Cardiovascular: Normal rate and regular rhythm.    Pulmonary/Chest: Effort normal and breath sounds normal.   Abdomen: Soft, + BS, NT.  Skin: trace bilateral lower extremity edema    Assessment/Plan:  Leydi Khan is a(n) 54 y.o. year old female with MDD.     Cardiac with hx of HTN, HLD.  Continue lisinopril 20 mg daily, atorvastatin 10 mg daily, Lasix 20 mg daily.  Seizure disorder.  Patient is on Depakote DR 1000 mg at bedtime.  DJD/OA/chronic low back pain.  Tylenol, lidocaine patch daily and Voltaren gel.  GERD.  Patient is on Protonix 20 mg daily.  Prediabetes.  Hgb A1c 5.7% on 6/11/24.  Lifestyle modifications.  Neuropathy involving bilateral wrist and arms.  Continue gabapentin 100 mg 3 times daily.  Vitamin D deficiency.  Patient started on vitamin D bolus doses for 14 weeks followed by vitamin D3 1000 units daily.  Vitamin B12 deficiency.  Patient started on monthly B12 injections.  Constipation.  Patient started on Senokot S daily.  Dulcolax suppository as needed.  Leg swelling.  Compression stockings ordered.  Encouraged to elevate lower extremities and avoid sodium.  Cont to monitor.    The patient was discussed with Dr. Jarquin and he is in agreement with the above note.  "

## 2024-06-29 PROCEDURE — 99232 SBSQ HOSP IP/OBS MODERATE 35: CPT | Performed by: STUDENT IN AN ORGANIZED HEALTH CARE EDUCATION/TRAINING PROGRAM

## 2024-06-29 RX ADMIN — PANTOPRAZOLE SODIUM 20 MG: 20 TABLET, DELAYED RELEASE ORAL at 09:24

## 2024-06-29 RX ADMIN — HYDROXYZINE HYDROCHLORIDE 25 MG: 25 TABLET ORAL at 19:36

## 2024-06-29 RX ADMIN — ERGOCALCIFEROL 50000 UNITS: 1.25 CAPSULE, LIQUID FILLED ORAL at 09:24

## 2024-06-29 RX ADMIN — TRAZODONE HYDROCHLORIDE 50 MG: 50 TABLET ORAL at 21:05

## 2024-06-29 RX ADMIN — PRAZOSIN HYDROCHLORIDE 2 MG: 1 CAPSULE ORAL at 21:04

## 2024-06-29 RX ADMIN — ATORVASTATIN CALCIUM 10 MG: 10 TABLET, FILM COATED ORAL at 21:05

## 2024-06-29 RX ADMIN — LISINOPRIL 20 MG: 20 TABLET ORAL at 09:24

## 2024-06-29 RX ADMIN — KETOTIFEN FUMARATE 1 DROP: 0.25 SOLUTION/ DROPS OPHTHALMIC at 17:00

## 2024-06-29 RX ADMIN — GABAPENTIN 100 MG: 100 CAPSULE ORAL at 09:24

## 2024-06-29 RX ADMIN — NYSTATIN: 100000 POWDER TOPICAL at 09:31

## 2024-06-29 RX ADMIN — RISPERIDONE 3 MG: 2 TABLET, FILM COATED ORAL at 09:24

## 2024-06-29 RX ADMIN — KETOTIFEN FUMARATE 1 DROP: 0.25 SOLUTION/ DROPS OPHTHALMIC at 09:31

## 2024-06-29 RX ADMIN — FUROSEMIDE 20 MG: 20 TABLET ORAL at 09:24

## 2024-06-29 RX ADMIN — GABAPENTIN 100 MG: 100 CAPSULE ORAL at 21:05

## 2024-06-29 RX ADMIN — Medication: at 17:03

## 2024-06-29 RX ADMIN — Medication: at 09:31

## 2024-06-29 RX ADMIN — RISPERIDONE 3 MG: 2 TABLET, FILM COATED ORAL at 16:59

## 2024-06-29 RX ADMIN — DIVALPROEX SODIUM 1250 MG: 250 TABLET, DELAYED RELEASE ORAL at 21:04

## 2024-06-29 RX ADMIN — Medication 3 MG: at 21:05

## 2024-06-29 RX ADMIN — GABAPENTIN 100 MG: 100 CAPSULE ORAL at 16:59

## 2024-06-29 NOTE — NURSING NOTE
"Patient presents to the this writer; she is tearful and states she feels that she could hurt herself.  Patient informs, \"I lied to everyone today, I told everyone that I was feeling good but I'm not.\" She does not have any specific reason as to why she feels this way.  Performed a Smith Scale rating with a result of 13.  Administered PRN Atarax 25 mg as per order for mild anxiety.  Patient required to remain visible in the milieu; is currently sitting in a chair positioned at the nurses station. She is wearing paper pants and shirt.  Finger foods order remains in place.     Denies HI and hallucinations.  Endorses mild anxiety and depression. LBM 6/29/2024 per patient. Continuous safety rounding in progress.   "

## 2024-06-29 NOTE — NURSING NOTE
B - Abdomen is large due to obesity. It is soft. Bowel sounds positive x4. Patient reports having 3 BMs yesterday.  L - Lungs clear to auscultation. Denies SOB or chest pain.  E - Non-pitting edema observed in BLLE. TEDS unable to be placed on patient due to size of legs. Encouraged to elevate legs at this time.  P - CRT <3 seconds. PPP.  S - Excoriation under abdominal fold. Patient uses Nystatin powder.

## 2024-06-29 NOTE — PROGRESS NOTES
"Progress Note - Behavioral Health   Leydi Khan 54 y.o. female MRN: [id number]  Unit/Bed#: [facility] 204-02 Encounter: [id number]    Subjective:    Per nursing, patient is visible in milieu, pleasant and cooperative, social with staff and peers, high depression and moderate anxiety.  Continues to have intermittent SI.      Per patient, patient reports some frustrations being on finger foods.  She reports otherwise her mood has been \"good, a little sad.\"  She reports sleeping and eating okay.  She reports feeling treated well by staff although notes that she gets frustrated when staff don't want to help her wipe herself.  She is working with OT on some functional skills.  She endorses some feelings of dizziness at times.    Behavior over the last 24 hours:  unchanged  Medication side effects: No  ROS:  dizziness    Objective:    Temp:  [97.6 °F (36.4 °C)-98.7 °F (37.1 °C)] 97.6 °F (36.4 °C)  HR:  [] 89  Resp:  [18] 18  BP: (120-143)/(58-69) 143/69    Mental Status Evaluation:  Appearance:  Dressed in hospital scrubs, a bit unkempt, cooperative with interview   Behavior:  No tics, tremors, or behaviors observed   Speech:  Normal rate, rhythm, and volume   Mood:  \"Good, a little sad\"   Affect:  Appears blunted   Thought Process:  Linear and goal directed   Associations intact associations   Thought Content:  No passive or active suicidal or homicidal ideation, intent, or plan.   Perceptual Disturbances: Denies any auditory or visual hallucinations   Sensorium:  Oriented to person, place, time, and situation   Memory:  recent and remote memory grossly intact   Consciousness:  alert and awake   Attention: mild concentration impairments   Insight:  Limited   Judgment: impaired   Gait/Station: normal gait/station   Motor Activity: no abnormal movements       Labs: I have personally reviewed all pertinent laboratory/tests results.  Labs in last 72 hours:   Recent Labs     06/27/24  1808   VALPROICTOT 46*       Progress " Toward Goals: Progressing    Recommended Treatment: Continue with group therapy, milieu therapy and occupational therapy.      Risks, benefits and possible side effects of Medications:   Risks, benefits, and possible side effects of medications explained to patient and patient verbalizes understanding.      Medications: all current active meds have been reviewed.  Current Facility-Administered Medications   Medication Dose Route Frequency Provider Last Rate    acetaminophen  650 mg Oral Q4H PRN Zita Pisano MD      acetaminophen  650 mg Oral Q4H PRN Zita Pisano MD      acetaminophen  975 mg Oral Q6H PRN Zita Pisano MD      aluminum-magnesium hydroxide-simethicone  30 mL Oral Q4H PRN Zita Pisano MD      ammonium lactate   Topical BID Sarah L Dagnall, PA-C      atorvastatin  10 mg Oral HS Sarha L Dagnall, PA-C      bisacodyl  10 mg Rectal Daily PRN Sudhir Jarquin MD      [START ON 9/21/2024] Cholecalciferol  1,000 Units Oral Daily Sudhir Jarquin MD      cyanocobalamin  1,000 mcg Intramuscular Q30 Days Sudhir Jarquin MD      divalproex sodium  1,250 mg Oral HS SHANI Hawk      ergocalciferol  50,000 Units Oral Weekly Sudhir Jarquin MD      furosemide  20 mg Oral Daily Sarah L Dagnall, PA-C      gabapentin  100 mg Oral TID Sarah L Dagnall, PA-C      haloperidol lactate  5 mg Intramuscular Q4H PRN Max 4/day Zita Pisano MD      hydrOXYzine HCL  25 mg Oral Q6H PRN Max 4/day Zita Pisano MD      hydrOXYzine HCL  50 mg Oral Q6H PRN Max 4/day Zita Pisano MD      Ketotifen Fumarate  1 drop Both Eyes BID Sarah L Dagnall, PA-C      lisinopril  20 mg Oral Daily Sarah L Dagnall, PA-C      meclizine  12.5 mg Oral Q8H PRN Sarah L Roel, PA-C      melatonin  3 mg Oral HS Zita Pisano MD      nicotine polacrilex  4 mg Oral Q2H PRN Zita Pisano MD      nystatin   Topical BID Sarah L Dagnall, PA-C      pantoprazole  20 mg Oral Daily Sarah L Dagnall, PA-C       prazosin  2 mg Oral HS Jose Elias Aquino MD      propranolol  5 mg Oral Q8H PRN Zita Pisano MD      risperiDONE  0.25 mg Oral Q4H PRN Max 6/day Zita Pisano MD      risperiDONE  0.5 mg Oral Q4H PRN Max 3/day Zita Pisano MD      risperiDONE  1 mg Oral Q2H PRN Max 3/day Zita Pisano MD      risperiDONE  3 mg Oral BID Jose Elias Aquino MD      senna-docusate sodium  1 tablet Oral HS Sudhir Jarquin MD      traZODone  50 mg Oral Q6H PRN Max 3/day Zita Pisano MD      traZODone  50 mg Oral HS Jose Elias Aquino MD             Assessment & Plan   Principal Problem:    Schizoaffective disorder, depressive type ([facility])  Active Problems:    Primary hypertension    Hyperlipidemia    Class 3 severe obesity due to excess calories without serious comorbidity in adult (HCC)    Chronic midline low back pain without sciatica    PTSD (post-traumatic stress disorder)    Borderline personality disorder (HCC)    Seizures ([facility])    Anemia    Bilateral leg edema    55 y/o Female with schizoaffective disorder, PTSD, borderline personality disorder, seizures- continues to have intermittent SI, no self-injurious behaviors today, continues to endorse some depressive symptoms, poor self-care    Plan:  -Continue current med regimen.  Depakote was titrated on 6/27/24, next serum VPA level on 7/1/24.

## 2024-06-29 NOTE — PROGRESS NOTES
"Progress Note - Leydi Khan 54 y.o. female MRN: [id number]    Unit/Bed#: [facility] 204-02 Encounter: [id number]        Subjective:   Patient seen and examined at bedside after reviewing the chart and discussing the case with the caring staff.      Patient examined at bedside.  Patient has no acute symptoms.    Physical Exam   Vitals: Blood pressure 143/69, pulse 89, temperature 97.6 °F (36.4 °C), temperature source Temporal, resp. rate 18, height 5' 4\" (1.626 m), weight 134 kg (296 lb 3.2 oz), SpO2 100%.,Body mass index is 50.84 kg/m².  Constitutional: Patient in no acute distress.  HEENT: PERR, EOMI, MMM.  Cardiovascular: Normal rate and regular rhythm.    Pulmonary/Chest: Effort normal and breath sounds normal.   Abdomen: Soft, + BS, NT.  Skin: trace bilateral lower extremity edema    Assessment/Plan:  Leydi Khan is a(n) 54 y.o. year old female with MDD.     Cardiac with hx of HTN, HLD.  Continue lisinopril 20 mg daily, atorvastatin 10 mg daily, Lasix 20 mg daily.  Seizure disorder.  Patient is on Depakote DR 1000 mg at bedtime.  DJD/OA/chronic low back pain.  Tylenol, lidocaine patch daily and Voltaren gel.  GERD.  Patient is on Protonix 20 mg daily.  Prediabetes.  Hgb A1c 5.7% on 6/11/24.  Lifestyle modifications.  Neuropathy involving bilateral wrist and arms.  Continue gabapentin 100 mg 3 times daily.  Vitamin D deficiency.  Patient started on vitamin D bolus doses for 14 weeks followed by vitamin D3 1000 units daily.  Vitamin B12 deficiency.  Patient started on monthly B12 injections.  Constipation.  Patient started on Senokot S daily.  Dulcolax suppository as needed.  Leg swelling.  Compression stockings ordered.  Encouraged to elevate lower extremities and avoid sodium.  Cont to monitor.    The patient was discussed with Dr. Jarquin and he is in agreement with the above note.  "

## 2024-06-29 NOTE — NURSING NOTE
Patient visible on the unit. Cooperative. Pleasant with staff. Denies SI, HI, hallucinations. Reports moderate anxiety, and depression. Compliant with medications. Able to make needs known. Continuous rounding maintained. Will continue to monitor and access.

## 2024-06-29 NOTE — NURSING NOTE
Patient slept through breakfast and declined her meal but was awake later in the morning. She then spent most of her day in the dining room. She socializes with peers.  She presents with flat affect. She does report severe anxiety and depression today. Endorses suicidal thoughts with plan to jump in front of a car or bang her head hard enough to cause damage. She does agree at this time that she will be safe and will not make any attempts to hurt herself. Agrees to come to staff if she begins feeling unsafe and her thoughts increase.  Positive coping skills, such as writing and coloring, were provided to patient and encouraged. She has no complaints of pain. No acute behaviors throughout the day.

## 2024-06-30 LAB — MRSA NOSE QL CULT: NORMAL

## 2024-06-30 PROCEDURE — 99232 SBSQ HOSP IP/OBS MODERATE 35: CPT | Performed by: STUDENT IN AN ORGANIZED HEALTH CARE EDUCATION/TRAINING PROGRAM

## 2024-06-30 RX ADMIN — Medication: at 09:30

## 2024-06-30 RX ADMIN — PRAZOSIN HYDROCHLORIDE 2 MG: 1 CAPSULE ORAL at 21:42

## 2024-06-30 RX ADMIN — HYDROXYZINE HYDROCHLORIDE 50 MG: 50 TABLET, FILM COATED ORAL at 17:36

## 2024-06-30 RX ADMIN — GABAPENTIN 100 MG: 100 CAPSULE ORAL at 21:42

## 2024-06-30 RX ADMIN — Medication 3 MG: at 21:42

## 2024-06-30 RX ADMIN — DIVALPROEX SODIUM 1250 MG: 250 TABLET, DELAYED RELEASE ORAL at 21:42

## 2024-06-30 RX ADMIN — SENNOSIDES AND DOCUSATE SODIUM 1 TABLET: 50; 8.6 TABLET ORAL at 21:42

## 2024-06-30 RX ADMIN — PANTOPRAZOLE SODIUM 20 MG: 20 TABLET, DELAYED RELEASE ORAL at 09:32

## 2024-06-30 RX ADMIN — GABAPENTIN 100 MG: 100 CAPSULE ORAL at 09:32

## 2024-06-30 RX ADMIN — GABAPENTIN 100 MG: 100 CAPSULE ORAL at 17:36

## 2024-06-30 RX ADMIN — KETOTIFEN FUMARATE 1 DROP: 0.25 SOLUTION/ DROPS OPHTHALMIC at 09:31

## 2024-06-30 RX ADMIN — NYSTATIN: 100000 POWDER TOPICAL at 09:32

## 2024-06-30 RX ADMIN — RISPERIDONE 3 MG: 2 TABLET, FILM COATED ORAL at 17:36

## 2024-06-30 RX ADMIN — ATORVASTATIN CALCIUM 10 MG: 10 TABLET, FILM COATED ORAL at 21:41

## 2024-06-30 RX ADMIN — TRAZODONE HYDROCHLORIDE 50 MG: 50 TABLET ORAL at 21:42

## 2024-06-30 RX ADMIN — RISPERIDONE 3 MG: 2 TABLET, FILM COATED ORAL at 09:31

## 2024-06-30 RX ADMIN — KETOTIFEN FUMARATE 1 DROP: 0.25 SOLUTION/ DROPS OPHTHALMIC at 17:38

## 2024-06-30 RX ADMIN — LISINOPRIL 20 MG: 20 TABLET ORAL at 09:32

## 2024-06-30 RX ADMIN — FUROSEMIDE 20 MG: 20 TABLET ORAL at 09:31

## 2024-06-30 NOTE — NURSING NOTE
Leydi remains visible in the community at this time. She continues to inform that she does not feel safe.  A recliner was positioned near the nurses station for ongoing monitoring of behavior.  She was present for snack time without issue.  Leydi utilized the bathroom tonight then informed she hit herself on the head with her hand while in the bathroom.  No obvious signs of injury noted.  She took most of her HS medications tonight, however declined scheduled Senna, stating she had multiple BM today and did not need the medication.  Continuous safety rounding in progress.

## 2024-06-30 NOTE — PROGRESS NOTES
"Progress Note - Behavioral Health   Leydi Khan 54 y.o. female MRN: [id number]  Unit/Bed#: [facility] 204-02 Encounter: [id number]    Subjective:    Per nursing, patient reported yesterday evening thoughts to harm herself, received prn Atarax.  Expressed later in the evening she still did not feel safe, hit self on head with her hand in the bathroom.      Per patient, patient reports not feeling well last night, crying a bit at night.  She reports her mood is \"pretty good' today.  She reports that she has been tired, sleeping okay.  She reports having thoughts of harming herself last night but reports that they are better today.  She reports feeling dizzy and nauseous at times.  She denies any active SI/HI today.     Behavior over the last 24 hours:  regressed  Medication side effects: No  ROS: nausea and dizziness    Objective:    Temp:  [98.2 °F (36.8 °C)-98.7 °F (37.1 °C)] 98.7 °F (37.1 °C)  HR:  [70-92] 70  Resp:  [17-18] 17  BP: (117-122)/(50-60) 117/50    Mental Status Evaluation:  Appearance:  Lying in bed, calm, appropriate, participates in interview, child-like   Behavior:  No tics, tremors, or behaviors observed   Speech:  Normal rate, rhythm, and volume   Mood:  \"Pretty good\"   Affect:  Appears constricted in depressed range, stable, mood-congruent   Thought Process:  Linear and goal directed, concrete   Associations intact associations   Thought Content:  No passive or active suicidal or homicidal ideation, intent, or plan.   Perceptual Disturbances: Denies any auditory or visual hallucinations   Sensorium:  Oriented to person, place, time, and situation   Memory:  recent and remote memory grossly intact   Consciousness:  alert and awake   Attention: mild concentration impairments   Insight:  Limited   Judgment: poor   Gait/Station: Did not assess   Motor Activity: no abnormal movements       Labs: I have personally reviewed all pertinent laboratory/tests results.  Labs in last 72 hours:   Recent Labs     " 06/27/24  1808   VALPROICTOT 46*       Progress Toward Goals: Minimal progress    Recommended Treatment: Continue with group therapy, milieu therapy and occupational therapy.      Risks, benefits and possible side effects of Medications:   Risks, benefits, and possible side effects of medications explained to patient and patient verbalizes understanding.      Medications: all current active meds have been reviewed.  Current Facility-Administered Medications   Medication Dose Route Frequency Provider Last Rate    acetaminophen  650 mg Oral Q4H PRN Zita Pisano MD      acetaminophen  650 mg Oral Q4H PRN Zita Pisano MD      acetaminophen  975 mg Oral Q6H PRN Zita Pisano MD      aluminum-magnesium hydroxide-simethicone  30 mL Oral Q4H PRN Zita Pisano MD      ammonium lactate   Topical BID Sarah L Dagnall, PA-C      atorvastatin  10 mg Oral HS Sarah Mckay, PA-C      bisacodyl  10 mg Rectal Daily PRN Sudhir Jarquin MD      [START ON 9/21/2024] Cholecalciferol  1,000 Units Oral Daily Sudhir Jarquin MD      cyanocobalamin  1,000 mcg Intramuscular Q30 Days Sudhir Jarquin MD      divalproex sodium  1,250 mg Oral HS SHANI Hawk      ergocalciferol  50,000 Units Oral Weekly Sudhir Jarquin MD      furosemide  20 mg Oral Daily Sarah L Dagnall, PA-C      gabapentin  100 mg Oral TID Sarah Mckay, PA-C      haloperidol lactate  5 mg Intramuscular Q4H PRN Max 4/day Zita Pisano MD      hydrOXYzine HCL  25 mg Oral Q6H PRN Max 4/day Zita Pisano MD      hydrOXYzine HCL  50 mg Oral Q6H PRN Max 4/day Zita Pisano MD      Ketotifen Fumarate  1 drop Both Eyes BID Sarah L Woody, PA-C      lisinopril  20 mg Oral Daily Sarah L Daglanel, PA-C      meclizine  12.5 mg Oral Q8H PRN Sarah Mckay, PA-C      melatonin  3 mg Oral HS Zita Pisano MD      nicotine polacrilex  4 mg Oral Q2H PRN Zita Pisano MD      nystatin   Topical BID Sarah L Woody, PA-C       pantoprazole  20 mg Oral Daily Sarah Mckay PA-C      prazosin  2 mg Oral HS Jose Elias Aquino MD      propranolol  5 mg Oral Q8H PRN Zita Pisano MD      risperiDONE  0.25 mg Oral Q4H PRN Max 6/day Zita Pisano MD      risperiDONE  0.5 mg Oral Q4H PRN Max 3/day Zita Pisano MD      risperiDONE  1 mg Oral Q2H PRN Max 3/day Zita Pisano MD      risperiDONE  3 mg Oral BID Jose Elias Aquino MD      senna-docusate sodium  1 tablet Oral HS Sudhir Jarquin MD      traZODone  50 mg Oral Q6H PRN Max 3/day Zita Pisano MD      traZODone  50 mg Oral HS Jose Elias Aquino MD             Assessment & Plan   Principal Problem:    Schizoaffective disorder, depressive type (HCC)  Active Problems:    Primary hypertension    Hyperlipidemia    Class 3 severe obesity due to excess calories without serious comorbidity in adult (HCC)    Chronic midline low back pain without sciatica    PTSD (post-traumatic stress disorder)    Borderline personality disorder (HCC)    Seizures (HCC)    Anemia    Bilateral leg edema    53 y/o Female with schizoaffective disorder, borderline personality disorder, PTSD- had some self-injurious thoughts yesterday evening, hitting self on head in bathroom, no current self-injury, no current SI, constricted affect    Plan:  -Continue current med regimen.  -Would consider behavioral modification plan to incentivize keeping self safe.  Continue paper scrubs and finger foods for now.

## 2024-06-30 NOTE — PLAN OF CARE
Problem: PAIN - ADULT  Goal: Verbalizes/displays adequate comfort level or baseline comfort level  Description: Interventions:  - Encourage patient to monitor pain and request assistance  - Assess pain using appropriate pain scale  - Administer analgesics based on type and severity of pain and evaluate response  - Implement non-pharmacological measures as appropriate and evaluate response  - Consider cultural and social influences on pain and pain management  - Notify physician/advanced practitioner if interventions unsuccessful or patient reports new pain  Outcome: Progressing     Problem: SAFETY ADULT  Goal: Patient will remain free of falls  Description: INTERVENTIONS:  - Educate patient/family on patient safety including physical limitations  - Instruct patient to call for assistance with activity   - Consult OT/PT to assist with strengthening/mobility   - Keep Call bell within reach  - Keep bed low and locked with side rails adjusted as appropriate  - Keep care items and personal belongings within reach  - Initiate and maintain comfort rounds  - Make Fall Risk Sign visible to staff  - Apply yellow socks and bracelet for high fall risk patients  - Consider moving patient to room near nurses station  Outcome: Progressing     Problem: Alteration in Thoughts and Perception  Goal: Treatment Goal: Gain control of psychotic behaviors/thinking, reduce/eliminate presenting symptoms and demonstrate improved reality functioning upon discharge  Outcome: Progressing  Goal: Refrain from acting on delusional thinking/internal stimuli  Description: Interventions:  - Monitor patient closely, per order   - Utilize least restrictive measures   - Set reasonable limits, give positive feedback for acceptable   - Administer medications as ordered and monitor of potential side effects  Outcome: Progressing  Goal: Agree to be compliant with medication regime, as prescribed and report medication side effects  Description:  Interventions:  - Offer appropriate PRN medication and supervise ingestion; conduct AIMS, as needed   Outcome: Progressing     Problem: Ineffective Coping  Goal: Demonstrates healthy coping skills  Outcome: Progressing  Goal: Participates in unit activities  Description: Interventions:  - Provide therapeutic environment   - Provide required programming   - Redirect inappropriate behaviors   Outcome: Progressing  Goal: Patient/Family participate in treatment and DC plans  Description: Interventions:  - Provide therapeutic environment  Outcome: Progressing  Goal: Patient/Family verbalizes awareness of resources  Outcome: Progressing  Goal: Understands least restrictive measures  Description: Interventions:  - Utilize least restrictive behavior  Outcome: Progressing  Goal: Free from restraint events  Description: - Utilize least restrictive measures   - Provide behavioral interventions   - Redirect inappropriate behaviors   Outcome: Progressing     Problem: Risk for Self Injury/Neglect  Goal: Treatment Goal: Remain safe during length of stay, learn and adopt new coping skills, and be free of self-injurious ideation, impulses and acts at the time of discharge  Outcome: Not Progressing  Goal: Attend and participate in unit activities, including therapeutic, recreational, and educational groups  Description: Interventions:  - Provide therapeutic and educational activities daily, encourage attendance and participation, and document same in the medical record  - Obtain collateral information, encourage visitation and family involvement in care   Outcome: Progressing  Goal: Complete daily ADLs, including personal hygiene independently, as able  Description: Interventions:  - Observe, teach, and assist patient with ADLS  - Monitor and promote a balance of rest/activity, with adequate nutrition and elimination  Outcome: Progressing     Problem: Depression  Goal: Verbalize thoughts and feelings  Description: Interventions:  -  Assess and re-assess patient's level of risk   - Engage patient in 1:1 interactions, daily, for a minimum of 15 minutes   - Encourage patient to express feelings, fears, frustrations, hopes   Outcome: Progressing  Goal: Refrain from isolation  Description: Interventions:  - Develop a trusting relationship   - Encourage socialization   Outcome: Progressing  Goal: Refrain from self-neglect  Outcome: Progressing     Problem: Anxiety  Goal: Anxiety is at manageable level  Description: Interventions:  - Assess and monitor patient's anxiety level.   - Monitor for signs and symptoms (heart palpitations, chest pain, shortness of breath, headaches, nausea, feeling jumpy, restlessness, irritable, apprehensive).   - Collaborate with interdisciplinary team and initiate plan and interventions as ordered.  - [place] patient to unit/surroundings  - Explain treatment plan  - Encourage participation in care  - Encourage verbalization of concerns/fears  - Identify coping mechanisms  - Assist in developing anxiety-reducing skills  - Administer/offer alternative therapies  - Limit or eliminate stimulants  Outcome: Progressing     Problem: Nutrition/Hydration-ADULT  Goal: Nutrient/Hydration intake appropriate for improving, restoring or maintaining nutritional needs  Description: Monitor and assess patient's nutrition/hydration status for malnutrition. Collaborate with interdisciplinary team and initiate plan and interventions as ordered.  Monitor patient's weight and dietary intake as ordered or per policy. Utilize nutrition screening tool and intervene as necessary. Determine patient's food preferences and provide high-protein, high-caloric foods as appropriate.     INTERVENTIONS:  - Monitor oral intake, urinary output, labs, and treatment plans  - Assess nutrition and hydration status and recommend course of action  - Evaluate amount of meals eaten  - Assist patient with eating if necessary   - Allow adequate time for meals  -  Recommend/ encourage appropriate diets, oral nutritional supplements, and vitamin/mineral supplements  - Order, calculate, and assess calorie counts as needed  - Recommend, monitor, and adjust tube feedings and TPN/PPN based on assessed needs  - Assess need for intravenous fluids  - Provide specific nutrition/hydration education as appropriate  - Include patient/family/caregiver in decisions related to nutrition  Outcome: Progressing

## 2024-06-30 NOTE — PROGRESS NOTES
06/30/24 1736   Smith Anxiety Scale   Anxious Mood 4   Tension 3   Fears 4   Insomnia 0   Intellectual 3   Depressed Mood 3   Somatic Complaints: Muscular 0   Somatic Complaints: Sensory 0   Cardiovascular Symptoms 0   Respiratory Symptoms 0   Gastrointestinal Symptoms 0   Genitourinary Symptoms 0   Autonomic Symptoms 1   Behavior at Interview 2   Smith Anxiety Score 20     50mg Atarax administered at 1736 for moderate anxiety as indicated by Smith score.

## 2024-06-30 NOTE — NURSING NOTE
"Upon initial assessment this morning, patient was pleasant and affect appeared more animated. She was socializing with peers in the dining room. She did endorse mild depression and anxiety but denied suicidal thoughts and was reporting feeling safe.  Later in the day, patient wrote the word \"suicidal\" on her forearm with a crayon and was reporting suicidal thoughts with plan to smash her head into a wall. She was then sat by the nurse's station in a chair. At the end of this nurse's shift, patient continues to report feeling unsafe and is unable to agree not to harm herself at this time. She continues sitting with staff for safety at this time.  "

## 2024-06-30 NOTE — NURSING NOTE
On reassessment of PRN Atarax effectiveness, patient informs the medication did not help her.  She presents as calm with no tearfulness.  She remains visible in the community; positive for snack and fluids tonight.

## 2024-06-30 NOTE — NURSING NOTE
"Patient appears to be sleeping well thus far, currently in bed. No expression of self harm or feeling unsafe. Remains on 7\" checks for safety and behaviors.   "

## 2024-06-30 NOTE — PROGRESS NOTES
"Progress Note - Leydi Khan 54 y.o. female MRN: [id number]    Unit/Bed#: [facility] 204-02 Encounter: [id number]        Subjective:   Patient seen and examined at bedside after reviewing the chart and discussing the case with the caring staff.      Patient examined at bedside.  Patient has no acute symptoms.    Physical Exam   Vitals: Blood pressure 117/50, pulse 70, temperature 98.7 °F (37.1 °C), temperature source Temporal, resp. rate 17, height 5' 4\" (1.626 m), weight 134 kg (296 lb 3.2 oz), SpO2 90%.,Body mass index is 50.84 kg/m².  Constitutional: Patient in no acute distress.  HEENT: PERR, EOMI, MMM.  Cardiovascular: Normal rate and regular rhythm.    Pulmonary/Chest: Effort normal and breath sounds normal.   Abdomen: Soft, + BS, NT.  Skin: trace bilateral lower extremity edema    Assessment/Plan:  Leydi Khan is a(n) 54 y.o. year old female with MDD.     Cardiac with hx of HTN, HLD.  Continue lisinopril 20 mg daily, atorvastatin 10 mg daily, Lasix 20 mg daily.  Seizure disorder.  Patient is on Depakote DR 1000 mg at bedtime.  DJD/OA/chronic low back pain.  Tylenol, lidocaine patch daily and Voltaren gel.  GERD.  Patient is on Protonix 20 mg daily.  Prediabetes.  Hgb A1c 5.7% on 6/11/24.  Lifestyle modifications.  Neuropathy involving bilateral wrist and arms.  Continue gabapentin 100 mg 3 times daily.  Vitamin D deficiency.  Patient started on vitamin D bolus doses for 14 weeks followed by vitamin D3 1000 units daily.  Vitamin B12 deficiency.  Patient started on monthly B12 injections.  Constipation.  Patient started on Senokot S daily.  Dulcolax suppository as needed.  Leg swelling.  Compression stockings ordered.  Encouraged to elevate lower extremities and avoid sodium.  Cont to monitor.    The patient was discussed with Dr. Jarquin and he is in agreement with the above note.  "

## 2024-07-01 LAB — VALPROATE SERPL-MCNC: 55 UG/ML (ref 50–100)

## 2024-07-01 PROCEDURE — 80164 ASSAY DIPROPYLACETIC ACD TOT: CPT

## 2024-07-01 PROCEDURE — 99232 SBSQ HOSP IP/OBS MODERATE 35: CPT

## 2024-07-01 RX ADMIN — DIVALPROEX SODIUM 1250 MG: 250 TABLET, DELAYED RELEASE ORAL at 21:02

## 2024-07-01 RX ADMIN — KETOTIFEN FUMARATE 1 DROP: 0.25 SOLUTION/ DROPS OPHTHALMIC at 09:18

## 2024-07-01 RX ADMIN — GABAPENTIN 100 MG: 100 CAPSULE ORAL at 17:41

## 2024-07-01 RX ADMIN — ATORVASTATIN CALCIUM 10 MG: 10 TABLET, FILM COATED ORAL at 21:02

## 2024-07-01 RX ADMIN — FUROSEMIDE 20 MG: 20 TABLET ORAL at 08:34

## 2024-07-01 RX ADMIN — GABAPENTIN 100 MG: 100 CAPSULE ORAL at 21:03

## 2024-07-01 RX ADMIN — LISINOPRIL 20 MG: 20 TABLET ORAL at 08:34

## 2024-07-01 RX ADMIN — RISPERIDONE 3 MG: 2 TABLET, FILM COATED ORAL at 17:41

## 2024-07-01 RX ADMIN — TRAZODONE HYDROCHLORIDE 50 MG: 50 TABLET ORAL at 21:02

## 2024-07-01 RX ADMIN — PANTOPRAZOLE SODIUM 20 MG: 20 TABLET, DELAYED RELEASE ORAL at 08:34

## 2024-07-01 RX ADMIN — RISPERIDONE 3 MG: 2 TABLET, FILM COATED ORAL at 08:34

## 2024-07-01 RX ADMIN — PRAZOSIN HYDROCHLORIDE 2 MG: 1 CAPSULE ORAL at 21:02

## 2024-07-01 RX ADMIN — SENNOSIDES AND DOCUSATE SODIUM 1 TABLET: 50; 8.6 TABLET ORAL at 21:03

## 2024-07-01 RX ADMIN — GABAPENTIN 100 MG: 100 CAPSULE ORAL at 08:34

## 2024-07-01 RX ADMIN — NYSTATIN 1 APPLICATION: 100000 POWDER TOPICAL at 09:13

## 2024-07-01 RX ADMIN — Medication 3 MG: at 21:02

## 2024-07-01 NOTE — NURSING NOTE
Patient observed in chair at nurses station. Calm and cooperative. Reported she was having suicidal thoughts today, feels better this evening. Denies HI, and hallucinations. Endorses moderate anxiety and depression.  Participated in snack. Compliant with medications. Continuous rounding maintained.

## 2024-07-01 NOTE — NURSING NOTE
Patient is withdrawn to room, medication compliant and cooperative. Patient has high anxiety and high depression, passive SI, denies HI and hallucinations. Continued care and continual safety rounds in progress.

## 2024-07-01 NOTE — PROGRESS NOTES
"Progress Note - Leydi Khan 54 y.o. female MRN: [id number]    Unit/Bed#: [facility] 204-02 Encounter: [id number]        Subjective:   Patient seen and examined at bedside after reviewing the chart and discussing the case with the caring staff.      Patient examined at bedside.  Patient has no acute symptoms.    Physical Exam   Vitals: Blood pressure 141/64, pulse 70, temperature (!) 97 °F (36.1 °C), temperature source Temporal, resp. rate 16, height 5' 4\" (1.626 m), weight 134 kg (296 lb 3.2 oz), SpO2 99%.,Body mass index is 50.84 kg/m².  Constitutional: Patient in no acute distress.  HEENT: PERR, EOMI, MMM.  Cardiovascular: Normal rate and regular rhythm.    Pulmonary/Chest: Effort normal and breath sounds normal.   Abdomen: Soft, + BS, NT.  Skin: trace bilateral lower extremity edema    Assessment/Plan:  Leydi Khan is a(n) 54 y.o. year old female with MDD.     Cardiac with hx of HTN, HLD.  Continue lisinopril 20 mg daily, atorvastatin 10 mg daily, Lasix 20 mg daily.  Seizure disorder.  Patient is on Depakote DR 1000 mg at bedtime.  DJD/OA/chronic low back pain.  Tylenol, lidocaine patch daily and Voltaren gel.  GERD.  Patient is on Protonix 20 mg daily.  Prediabetes.  Hgb A1c 5.7% on 6/11/24.  Lifestyle modifications.  Neuropathy involving bilateral wrist and arms.  Continue gabapentin 100 mg 3 times daily.  Vitamin D deficiency.  Patient started on vitamin D bolus doses for 14 weeks followed by vitamin D3 1000 units daily.  Vitamin B12 deficiency.  Patient started on monthly B12 injections.  Constipation.  Patient started on Senokot S daily.  Dulcolax suppository as needed.  Leg swelling.  Compression stockings ordered.  Encouraged to elevate lower extremities and avoid sodium.  Cont to monitor.  "

## 2024-07-01 NOTE — PROGRESS NOTES
"Progress Note - Behavioral Health   Leydi Khan 54 y.o. female MRN: [id number]  Unit/Bed#: [facility] 204-02 Encounter: [id number]    Assessment & Plan   Principal Problem:    Schizoaffective disorder, depressive type (HCC)  Active Problems:    Primary hypertension    Hyperlipidemia    Class 3 severe obesity due to excess calories without serious comorbidity in adult (HCC)    Chronic midline low back pain without sciatica    PTSD (post-traumatic stress disorder)    Borderline personality disorder (HCC)    Seizures (HCC)    Anemia    Bilateral leg edema      Behavior over the last 24 hours:  unchanged  Sleep: normal  Appetite: normal  Medication side effects: No  ROS: no complaints    Subjective: Leydi was seen today for psychotropic medication regimen. Patient is calm, visible on the unit and social with peers. She reports chronic thoughts to self harm, however, she would not act on them and would be able to let staff know if thoughts become unbearable. She is able to contract for safety on the unit. She is compliant with her current medication regimen. She denied any HI/AVH    Mental Status Evaluation:  Appearance:  age appropriate and overweight   Behavior:  cooperative   Speech:  normal pitch and normal volume   Mood:  \"depressed\"   Affect:  constricted   Thought Process:  goal directed   Associations: intact associations   Thought Content:  No overt delusions   Perceptual Disturbances: Denied AVH, did not appear internally preoccupied   Risk Potential: Suicidal Ideations none at present  Homicidal Ideations none at present  Potential for Aggression No   Sensorium:  person, place, and time/date   Memory:  recent and remote memory grossly intact   Consciousness:  alert and awake    Attention: attention span appeared shorter than expected for age   Insight:  limited   Judgment: poor   Gait/Station: Seated in a chair   Motor Activity: no abnormal movements     Progress Toward Goals: Unchanged. Patient endorses chronic " thoughts to self harm. She is able to contract for safety on the unit. She is compliant with her current psychotropic medication regimen. She denied side effects from current psychotropic medication regimen. Will continue current psychotropic medication regimen. No discharge date at this time.    Depakote level due on 7/1/2024 at 6 pm.    Recommended Treatment: Continue with group therapy, milieu therapy and occupational therapy.      Risks, benefits and possible side effects of Medications:   Risks, benefits, and possible side effects of medications explained to patient and patient verbalizes understanding.      Medications: all current active meds have been reviewed.    Labs: I have personally reviewed all pertinent laboratory/tests results. Most Recent Labs:   Lab Results   Component Value Date    WBC 6.94 06/21/2024    RBC 3.81 06/21/2024    HGB 12.2 06/21/2024    HCT 37.1 06/21/2024     06/21/2024    RDW 14.3 06/21/2024    NEUTROABS 4.34 04/20/2024    SODIUM 137 06/21/2024    K 4.0 06/21/2024    CL 98 06/21/2024    CO2 30 06/21/2024    BUN 10 06/21/2024    CREATININE 0.42 (L) 06/21/2024    GLUC 114 06/21/2024    GLUF 114 (H) 06/21/2024    CALCIUM 9.4 06/21/2024    AST 14 06/21/2024    ALT 23 06/21/2024    ALKPHOS 55 06/21/2024    TP 7.0 06/21/2024    ALB 4.0 06/21/2024    TBILI 0.51 06/21/2024    CHOLESTEROL 168 06/21/2024    HDL 32 (L) 06/21/2024    TRIG 156 (H) 06/21/2024    LDLCALC 105 (H) 06/21/2024    NONHDLC 136 06/21/2024    VALPROICTOT 46 (L) 06/27/2024    [id number] 3.243 06/20/2024    FREET4 0.65 06/11/2024    PREGSERUM Negative 04/28/2022    RPR Non-Reactive 12/13/2022    HGBA1C 5.7 (H) 06/11/2024     06/11/2024       Counseling / Coordination of Care  Total floor / unit time spent today 25 minutes.

## 2024-07-02 PROCEDURE — 99232 SBSQ HOSP IP/OBS MODERATE 35: CPT | Performed by: PSYCHIATRY & NEUROLOGY

## 2024-07-02 RX ORDER — DIVALPROEX SODIUM 250 MG/1
1250 TABLET, DELAYED RELEASE ORAL
Qty: 150 TABLET | Refills: 0 | Status: SHIPPED | OUTPATIENT
Start: 2024-07-02

## 2024-07-02 RX ORDER — GABAPENTIN 100 MG/1
200 CAPSULE ORAL 3 TIMES DAILY
Qty: 180 CAPSULE | Refills: 0 | Status: SHIPPED | OUTPATIENT
Start: 2024-07-02

## 2024-07-02 RX ORDER — TRAZODONE HYDROCHLORIDE 50 MG/1
50 TABLET ORAL
Qty: 30 TABLET | Refills: 0 | Status: SHIPPED | OUTPATIENT
Start: 2024-07-02

## 2024-07-02 RX ORDER — NYSTATIN 100000 [USP'U]/G
POWDER TOPICAL 2 TIMES DAILY
Qty: 30 G | Refills: 0 | Status: SHIPPED | OUTPATIENT
Start: 2024-07-03

## 2024-07-02 RX ORDER — ERGOCALCIFEROL 1.25 MG/1
50000 CAPSULE ORAL WEEKLY
Qty: 12 CAPSULE | Refills: 0 | Status: SHIPPED | OUTPATIENT
Start: 2024-07-06 | End: 2024-09-22

## 2024-07-02 RX ORDER — LISINOPRIL 20 MG/1
20 TABLET ORAL DAILY
Qty: 30 TABLET | Refills: 0 | Status: SHIPPED | OUTPATIENT
Start: 2024-07-02

## 2024-07-02 RX ORDER — LANOLIN ALCOHOL/MO/W.PET/CERES
3 CREAM (GRAM) TOPICAL
Qty: 30 TABLET | Refills: 0 | Status: SHIPPED | OUTPATIENT
Start: 2024-07-02

## 2024-07-02 RX ORDER — GABAPENTIN 100 MG/1
200 CAPSULE ORAL 3 TIMES DAILY
Status: DISCONTINUED | OUTPATIENT
Start: 2024-07-02 | End: 2024-07-05 | Stop reason: HOSPADM

## 2024-07-02 RX ORDER — FUROSEMIDE 20 MG/1
20 TABLET ORAL DAILY
Qty: 30 TABLET | Refills: 0 | Status: SHIPPED | OUTPATIENT
Start: 2024-07-02

## 2024-07-02 RX ORDER — ATORVASTATIN CALCIUM 10 MG/1
10 TABLET, FILM COATED ORAL
Qty: 30 TABLET | Refills: 0 | Status: SHIPPED | OUTPATIENT
Start: 2024-07-02

## 2024-07-02 RX ORDER — PANTOPRAZOLE SODIUM 20 MG/1
20 TABLET, DELAYED RELEASE ORAL DAILY
Qty: 30 TABLET | Refills: 0 | Status: SHIPPED | OUTPATIENT
Start: 2024-07-02

## 2024-07-02 RX ORDER — AMOXICILLIN 250 MG
1 CAPSULE ORAL
Qty: 30 TABLET | Refills: 0 | Status: SHIPPED | OUTPATIENT
Start: 2024-07-03

## 2024-07-02 RX ORDER — RISPERIDONE 3 MG/1
3 TABLET ORAL 2 TIMES DAILY
Qty: 60 TABLET | Refills: 0 | Status: SHIPPED | OUTPATIENT
Start: 2024-07-02

## 2024-07-02 RX ORDER — AMMONIUM LACTATE 12 G/100G
LOTION TOPICAL 2 TIMES DAILY
Qty: 225 G | Refills: 0 | Status: SHIPPED | OUTPATIENT
Start: 2024-07-02

## 2024-07-02 RX ORDER — PRAZOSIN HYDROCHLORIDE 2 MG/1
2 CAPSULE ORAL
Qty: 30 CAPSULE | Refills: 0 | Status: SHIPPED | OUTPATIENT
Start: 2024-07-02 | End: 2024-08-01

## 2024-07-02 RX ADMIN — NYSTATIN 1 APPLICATION: 100000 POWDER TOPICAL at 21:07

## 2024-07-02 RX ADMIN — Medication: at 08:45

## 2024-07-02 RX ADMIN — PANTOPRAZOLE SODIUM 20 MG: 20 TABLET, DELAYED RELEASE ORAL at 08:44

## 2024-07-02 RX ADMIN — LISINOPRIL 20 MG: 20 TABLET ORAL at 08:44

## 2024-07-02 RX ADMIN — DIVALPROEX SODIUM 1250 MG: 250 TABLET, DELAYED RELEASE ORAL at 21:07

## 2024-07-02 RX ADMIN — SENNOSIDES AND DOCUSATE SODIUM 1 TABLET: 50; 8.6 TABLET ORAL at 21:07

## 2024-07-02 RX ADMIN — KETOTIFEN FUMARATE 1 DROP: 0.25 SOLUTION/ DROPS OPHTHALMIC at 16:56

## 2024-07-02 RX ADMIN — GABAPENTIN 100 MG: 100 CAPSULE ORAL at 08:44

## 2024-07-02 RX ADMIN — ATORVASTATIN CALCIUM 10 MG: 10 TABLET, FILM COATED ORAL at 21:06

## 2024-07-02 RX ADMIN — Medication 3 MG: at 21:07

## 2024-07-02 RX ADMIN — RISPERIDONE 3 MG: 2 TABLET, FILM COATED ORAL at 08:43

## 2024-07-02 RX ADMIN — GABAPENTIN 200 MG: 100 CAPSULE ORAL at 15:14

## 2024-07-02 RX ADMIN — TRAZODONE HYDROCHLORIDE 50 MG: 50 TABLET ORAL at 21:07

## 2024-07-02 RX ADMIN — GABAPENTIN 200 MG: 100 CAPSULE ORAL at 21:06

## 2024-07-02 RX ADMIN — RISPERIDONE 3 MG: 2 TABLET, FILM COATED ORAL at 16:55

## 2024-07-02 RX ADMIN — FUROSEMIDE 20 MG: 20 TABLET ORAL at 08:44

## 2024-07-02 RX ADMIN — Medication 1 APPLICATION: at 21:07

## 2024-07-02 RX ADMIN — KETOTIFEN FUMARATE 1 DROP: 0.25 SOLUTION/ DROPS OPHTHALMIC at 08:45

## 2024-07-02 RX ADMIN — PRAZOSIN HYDROCHLORIDE 2 MG: 1 CAPSULE ORAL at 21:07

## 2024-07-02 NOTE — SOCIAL WORK
CM received message from Jud with [facility] whom indicated they will  PT on Friday at 1200pm. CM returned call and left message confirming transport time, requested return call with any needs.

## 2024-07-02 NOTE — PROGRESS NOTES
"Progress Note - Leydi Khan 54 y.o. female MRN: [id number]    Unit/Bed#: -02 Encounter: [id number]        Subjective:   Patient seen and examined at bedside after reviewing the chart and discussing the case with the caring staff.      Patient examined at bedside.  Patient complaining of neuropathy in her bilateral lower extremities.  Currently taking gabapentin.      Physical Exam   Vitals: Blood pressure 126/60, pulse 84, temperature 97.9 °F (36.6 °C), temperature source Temporal, resp. rate 18, height 5' 4\" (1.626 m), weight 134 kg (296 lb 3.2 oz), SpO2 97%.,Body mass index is 50.84 kg/m².  Constitutional: Patient in no acute distress.  HEENT: PERR, EOMI, MMM.  Cardiovascular: Normal rate and regular rhythm.    Pulmonary/Chest: Effort normal and breath sounds normal.   Abdomen: Soft, + BS, NT.    Assessment/Plan:  Leydi Khan is a(n) 54 y.o. year old female with MDD.     Cardiac with hx of HTN, HLD.  Continue lisinopril 20 mg daily, atorvastatin 10 mg daily, Lasix 20 mg daily.  Seizure disorder.  Patient is on Depakote DR 1000 mg at bedtime.  DJD/OA/chronic low back pain.  Tylenol, lidocaine patch daily and Voltaren gel.  GERD.  Patient is on Protonix 20 mg daily.  Prediabetes.  Hgb A1c 5.7% on 6/11/24.  Lifestyle modifications.  Neuropathy.  Involving upper and lower extremities.  Increase gabapentin to 200 mg 3 times daily 7/2/24.  Vitamin D deficiency.  Patient started on vitamin D bolus doses for 14 weeks followed by vitamin D3 1000 units daily.  Vitamin B12 deficiency.  Patient started on monthly B12 injections.  Constipation.  Patient started on Senokot S daily.  Dulcolax suppository as needed.  Leg swelling.  Compression stockings ordered.  Encouraged to elevate lower extremities and avoid sodium.  Cont to monitor.    The patient was discussed with Dr. Jarquin and he is in agreement with the above note.  "

## 2024-07-02 NOTE — NURSING NOTE
Patient was observed to be visible in the community this evening; she spent most of the time in the dining area with peers.  She has been pleasant and social with both staff and peers. Speech is loud at times. Denies any pain. Endorses ongoing high anxiety and depression, denies SI, HI and hallucinations at time of assessment.  No acute behaviors observed.  Leydi was medication compliant at .  Continuous safety rounding in progress.

## 2024-07-02 NOTE — PLAN OF CARE
Problem: PAIN - ADULT  Goal: Verbalizes/displays adequate comfort level or baseline comfort level  Description: Interventions:  - Encourage patient to monitor pain and request assistance  - Assess pain using appropriate pain scale  - Administer analgesics based on type and severity of pain and evaluate response  - Implement non-pharmacological measures as appropriate and evaluate response  - Consider cultural and social influences on pain and pain management  - Notify physician/advanced practitioner if interventions unsuccessful or patient reports new pain  Outcome: Progressing     Problem: SAFETY ADULT  Goal: Patient will remain free of falls  Description: INTERVENTIONS:  - Educate patient/family on patient safety including physical limitations  - Instruct patient to call for assistance with activity   - Consult OT/PT to assist with strengthening/mobility   - Keep Call bell within reach  - Keep bed low and locked with side rails adjusted as appropriate  - Keep care items and personal belongings within reach  - Initiate and maintain comfort rounds  - Make Fall Risk Sign visible to staff  - Apply yellow socks and bracelet for high fall risk patients  - Consider moving patient to room near nurses station  Outcome: Progressing     Problem: Alteration in Thoughts and Perception  Goal: Treatment Goal: Gain control of psychotic behaviors/thinking, reduce/eliminate presenting symptoms and demonstrate improved reality functioning upon discharge  Outcome: Progressing  Goal: Refrain from acting on delusional thinking/internal stimuli  Description: Interventions:  - Monitor patient closely, per order   - Utilize least restrictive measures   - Set reasonable limits, give positive feedback for acceptable   - Administer medications as ordered and monitor of potential side effects  Outcome: Progressing  Goal: Agree to be compliant with medication regime, as prescribed and report medication side effects  Description:  Interventions:  - Offer appropriate PRN medication and supervise ingestion; conduct AIMS, as needed   Outcome: Progressing  Goal: Recognize dysfunctional thoughts, communicate reality-based thoughts at the time of discharge  Description: Interventions:  - Provide medication and psycho-education to assist patient in compliance and developing insight into his/her illness   Outcome: Progressing     Problem: Ineffective Coping  Goal: Identifies healthy coping skills  Outcome: Progressing  Goal: Demonstrates healthy coping skills  Outcome: Progressing  Goal: Participates in unit activities  Description: Interventions:  - Provide therapeutic environment   - Provide required programming   - Redirect inappropriate behaviors   Outcome: Progressing  Goal: Patient/Family participate in treatment and DC plans  Description: Interventions:  - Provide therapeutic environment  Outcome: Progressing  Goal: Patient/Family verbalizes awareness of resources  Outcome: Progressing  Goal: Understands least restrictive measures  Description: Interventions:  - Utilize least restrictive behavior  Outcome: Progressing  Goal: Free from restraint events  Description: - Utilize least restrictive measures   - Provide behavioral interventions   - Redirect inappropriate behaviors   Outcome: Progressing     Problem: Risk for Self Injury/Neglect  Goal: Treatment Goal: Remain safe during length of stay, learn and adopt new coping skills, and be free of self-injurious ideation, impulses and acts at the time of discharge  Outcome: Progressing  Goal: Refrain from harming self  Description: Interventions:  - Monitor patient closely, per order  - Develop a trusting relationship  - Supervise medication ingestion, monitor effects and side effects   Outcome: Progressing  Goal: Attend and participate in unit activities, including therapeutic, recreational, and educational groups  Description: Interventions:  - Provide therapeutic and educational activities  daily, encourage attendance and participation, and document same in the medical record  - Obtain collateral information, encourage visitation and family involvement in care   Outcome: Progressing  Goal: Recognize maladaptive responses and adopt new coping mechanisms  Outcome: Progressing  Goal: Complete daily ADLs, including personal hygiene independently, as able  Description: Interventions:  - Observe, teach, and assist patient with ADLS  - Monitor and promote a balance of rest/activity, with adequate nutrition and elimination  Outcome: Progressing     Problem: Depression  Goal: Treatment Goal: Demonstrate behavioral control of depressive symptoms, verbalize feelings of improved mood/affect, and adopt new coping skills prior to discharge  Outcome: Progressing  Goal: Verbalize thoughts and feelings  Description: Interventions:  - Assess and re-assess patient's level of risk   - Engage patient in 1:1 interactions, daily, for a minimum of 15 minutes   - Encourage patient to express feelings, fears, frustrations, hopes   Outcome: Progressing  Goal: Refrain from isolation  Description: Interventions:  - Develop a trusting relationship   - Encourage socialization   Outcome: Progressing  Goal: Refrain from self-neglect  Outcome: Progressing     Problem: Anxiety  Goal: Anxiety is at manageable level  Description: Interventions:  - Assess and monitor patient's anxiety level.   - Monitor for signs and symptoms (heart palpitations, chest pain, shortness of breath, headaches, nausea, feeling jumpy, restlessness, irritable, apprehensive).   - Collaborate with interdisciplinary team and initiate plan and interventions as ordered.  - Carson patient to unit/surroundings  - Explain treatment plan  - Encourage participation in care  - Encourage verbalization of concerns/fears  - Identify coping mechanisms  - Assist in developing anxiety-reducing skills  - Administer/offer alternative therapies  - Limit or eliminate  stimulants  Outcome: Progressing     Problem: Nutrition/Hydration-ADULT  Goal: Nutrient/Hydration intake appropriate for improving, restoring or maintaining nutritional needs  Description: Monitor and assess patient's nutrition/hydration status for malnutrition. Collaborate with interdisciplinary team and initiate plan and interventions as ordered.  Monitor patient's weight and dietary intake as ordered or per policy. Utilize nutrition screening tool and intervene as necessary. Determine patient's food preferences and provide high-protein, high-caloric foods as appropriate.     INTERVENTIONS:  - Monitor oral intake, urinary output, labs, and treatment plans  - Assess nutrition and hydration status and recommend course of action  - Evaluate amount of meals eaten  - Assist patient with eating if necessary   - Allow adequate time for meals  - Recommend/ encourage appropriate diets, oral nutritional supplements, and vitamin/mineral supplements  - Order, calculate, and assess calorie counts as needed  - Recommend, monitor, and adjust tube feedings and TPN/PPN based on assessed needs  - Assess need for intravenous fluids  - Provide specific nutrition/hydration education as appropriate  - Include patient/family/caregiver in decisions related to nutrition  Outcome: Progressing

## 2024-07-02 NOTE — NURSING NOTE
"Patient visible in milieu, pleasant and cooperative in interaction. Social with staff and peers. Patient endorses moderate anxiety/depression, denies SI/HI, hallucinations. Remains medication compliant and on 7\" checks for safety and behaviors.   "

## 2024-07-02 NOTE — PROGRESS NOTES
"Progress Note - Behavioral Health   Leydi Khan 54 y.o. female MRN: [id number]  Unit/Bed#: [facility] 204-02 Encounter: [id number]    Assessment & Plan   Principal Problem:    Schizoaffective disorder, depressive type (HCC)  Active Problems:    Primary hypertension    Hyperlipidemia    Class 3 severe obesity due to excess calories without serious comorbidity in adult (HCC)    Chronic midline low back pain without sciatica    PTSD (post-traumatic stress disorder)    Borderline personality disorder (HCC)    Seizures (HCC)    Anemia    Bilateral leg edema      Behavior over the last 24 hours:  unchanged  Sleep: normal  Appetite: normal  Medication side effects: No  ROS: no complaints    Subjective: Leydi was seen today for psychiatric follow-up.  Patient is cooperative, visible on the unit, social with peers. Patient reports having no thoughts of self harm. Her mood is controlled on the unit with no recent behavioral outbursts. According to nursing staff patient is compliant with her current psychotropic medication regimen. She denied any SI/HI/AVH. She did not appear internally preoccupied.    Mental Status Evaluation:  Appearance:  age appropriate and overweight   Behavior:  cooperative   Speech:  normal pitch and normal volume   Mood:  \"A little depressed\"   Affect:  constricted   Thought Process:  goal directed   Associations: intact associations   Thought Content:  No overt delusions   Perceptual Disturbances: Denied AVH, did not appear internally preoccupied   Risk Potential: Suicidal Ideations none at present  Homicidal Ideations none at present  Potential for Aggression No   Sensorium:  person, place, and time/date   Memory:  recent and remote memory grossly intact   Consciousness:  alert and awake    Attention: attention span appeared shorter than expected for age   Insight:  limited   Judgment: poor   Gait/Station: Seated in a chair   Motor Activity: no abnormal movements     Progress Toward Goals: Unchanged. " Patient is controlled on the unit, she denies thoughts of self harm. She is compliant with her current psychotropic medication regimen. She denies side effects from current psychotropic medication regimen. Will continue current psychotropic medication regimen. Anticipated discharge on 7/5/2024.  Depakote level is 55 and therapeutic.    Recommended Treatment: Continue with group therapy, milieu therapy and occupational therapy.      Risks, benefits and possible side effects of Medications:   Risks, benefits, and possible side effects of medications explained to patient and patient verbalizes understanding.      Medications: all current active meds have been reviewed.    Labs: I have personally reviewed all pertinent laboratory/tests results. Most Recent Labs:   Lab Results   Component Value Date    WBC 6.94 06/21/2024    RBC 3.81 06/21/2024    HGB 12.2 06/21/2024    HCT 37.1 06/21/2024     06/21/2024    RDW 14.3 06/21/2024    NEUTROABS 4.34 04/20/2024    SODIUM 137 06/21/2024    K 4.0 06/21/2024    CL 98 06/21/2024    CO2 30 06/21/2024    BUN 10 06/21/2024    CREATININE 0.42 (L) 06/21/2024    GLUC 114 06/21/2024    GLUF 114 (H) 06/21/2024    CALCIUM 9.4 06/21/2024    AST 14 06/21/2024    ALT 23 06/21/2024    ALKPHOS 55 06/21/2024    TP 7.0 06/21/2024    ALB 4.0 06/21/2024    TBILI 0.51 06/21/2024    CHOLESTEROL 168 06/21/2024    HDL 32 (L) 06/21/2024    TRIG 156 (H) 06/21/2024    LDLCALC 105 (H) 06/21/2024    NONHDLC 136 06/21/2024    VALPROICTOT 55 07/01/2024    [id number] 3.243 06/20/2024    FREET4 0.65 06/11/2024    PREGSERUM Negative 04/28/2022    RPR Non-Reactive 12/13/2022    HGBA1C 5.7 (H) 06/11/2024     06/11/2024       Counseling / Coordination of Care  Total floor / unit time spent today 25 minutes.

## 2024-07-02 NOTE — SOCIAL WORK
ROSE placed call to [facility] to notify of PT scheduled discharge. [phone] left message requesting return call to discuss discharge plan for Friday.     ROSE placed call to [facility]   to notify of PT scheduled discharge and to schedule follow up. Spoke with Leydi gonzalez will update team. PT is scheduled for follow up on 7/9/23 at 1:40pm with Dr. Tho MD.      ROSE received call back from [facility] with [facility], Reviewed discharge plan for Friday along with follow up care and supports. Jud in agreement with all. [facility] will call ROSE back today with transport time from their team for Friday. Scripts to be sent to pharmacy today to [facility] located at [address] [place] [address] Madhu VINES. Jud requested for med list to be faxed to her today for review, CM in agreement. Call ended mutually.     ROSE faxed medication list to [facility] , confirmation received.     ROSE placed call to PCP office, spoke with Nelda, notified of PT scheduled discharge, Nelda will update team. PT is scheduled for follow up on 7/9/24 at 1:30pm. Call ended mutually. [phone].

## 2024-07-02 NOTE — NURSING NOTE
Patient appears to have slept through the overnight hours without issue.  No complaints voiced.  No acute behaviors observed. Leydi remains in bed sleeping at this time.  Continuous safety rounding in progress.

## 2024-07-03 PROCEDURE — 99232 SBSQ HOSP IP/OBS MODERATE 35: CPT

## 2024-07-03 RX ADMIN — TRAZODONE HYDROCHLORIDE 50 MG: 50 TABLET ORAL at 21:10

## 2024-07-03 RX ADMIN — GABAPENTIN 200 MG: 100 CAPSULE ORAL at 15:21

## 2024-07-03 RX ADMIN — RISPERIDONE 3 MG: 2 TABLET, FILM COATED ORAL at 16:36

## 2024-07-03 RX ADMIN — Medication: at 08:08

## 2024-07-03 RX ADMIN — PANTOPRAZOLE SODIUM 20 MG: 20 TABLET, DELAYED RELEASE ORAL at 08:06

## 2024-07-03 RX ADMIN — KETOTIFEN FUMARATE 1 DROP: 0.25 SOLUTION/ DROPS OPHTHALMIC at 08:11

## 2024-07-03 RX ADMIN — ATORVASTATIN CALCIUM 10 MG: 10 TABLET, FILM COATED ORAL at 21:10

## 2024-07-03 RX ADMIN — GABAPENTIN 200 MG: 100 CAPSULE ORAL at 08:07

## 2024-07-03 RX ADMIN — DIVALPROEX SODIUM 1250 MG: 250 TABLET, DELAYED RELEASE ORAL at 21:10

## 2024-07-03 RX ADMIN — SENNOSIDES AND DOCUSATE SODIUM 1 TABLET: 50; 8.6 TABLET ORAL at 21:09

## 2024-07-03 RX ADMIN — RISPERIDONE 3 MG: 2 TABLET, FILM COATED ORAL at 08:07

## 2024-07-03 RX ADMIN — Medication: at 21:13

## 2024-07-03 RX ADMIN — KETOTIFEN FUMARATE 1 DROP: 0.25 SOLUTION/ DROPS OPHTHALMIC at 16:36

## 2024-07-03 RX ADMIN — PRAZOSIN HYDROCHLORIDE 2 MG: 1 CAPSULE ORAL at 21:10

## 2024-07-03 RX ADMIN — Medication 3 MG: at 21:10

## 2024-07-03 RX ADMIN — GABAPENTIN 200 MG: 100 CAPSULE ORAL at 21:10

## 2024-07-03 NOTE — SOCIAL WORK
CM met with PT for PT check in. PT denies si/hi/avh anxiety and depression. Reviewed discharge plan for Friday for return to [facility], along with follow up with [facility], PT in agreement. Positive reinforcement provided for PT gains and progress on tx goals. PT was pleasant and cooperative in conversation.

## 2024-07-03 NOTE — PROGRESS NOTES
"Progress Note - Leydi Khan 54 y.o. female MRN: [id number]    Unit/Bed#: [facility] 204-02 Encounter: [id number]        Subjective:   Patient seen and examined at bedside after reviewing the chart and discussing the case with the caring staff.      Patient examined at bedside.  Patient has no acute symptoms.    Patient is a possible discharge on Friday, 7/5/2024.    Physical Exam   Vitals: Blood pressure 106/60, pulse 83, temperature 97.6 °F (36.4 °C), temperature source Temporal, resp. rate 18, height 5' 4\" (1.626 m), weight 134 kg (296 lb 3.2 oz), SpO2 93%.,Body mass index is 50.84 kg/m².  Constitutional: Patient in no acute distress.  HEENT: PERR, EOMI, MMM.  Cardiovascular: Normal rate and regular rhythm.    Pulmonary/Chest: Effort normal and breath sounds normal.   Abdomen: Soft, + BS, NT.    Assessment/Plan:  Leydi Khan is a(n) 54 y.o. year old female with MDD.    Medical clearance.  Patient is medically cleared for discharge.  All scripts were sent for the patient.     Cardiac with hx of HTN, HLD.  Continue lisinopril 20 mg daily, atorvastatin 10 mg daily, Lasix 20 mg daily.  Seizure disorder.  Patient is on Depakote DR 1000 mg at bedtime.  DJD/OA/chronic low back pain.  Tylenol, lidocaine patch daily and Voltaren gel.  GERD.  Patient is on Protonix 20 mg daily.  Prediabetes.  Hgb A1c 5.7% on 6/11/24.  Lifestyle modifications.  Neuropathy.  Involving upper and lower extremities.  Increased gabapentin to 200 mg 3 times daily on 7/2/24.  Vitamin D deficiency.  Patient started on vitamin D bolus doses for 14 weeks followed by vitamin D3 1000 units daily.  Vitamin B12 deficiency.  Patient started on monthly B12 injections.  Constipation.  Patient started on Senokot S daily.  Dulcolax suppository as needed.  Leg swelling.  Compression stockings ordered.  Encouraged to elevate lower extremities and avoid sodium.  Cont to monitor.    The patient was discussed with Dr. Jarquin and he is in agreement with the above note.  "

## 2024-07-03 NOTE — PROGRESS NOTES
Team Meeting   Meeting Type Daily Rounds   Team Members Present   Team Members Present Physician;Nurse;   Physician Team Member Dr. Marietta MD; SHANI Rubio   Nursing Team Member Lety Olmedo, JOSÉ MIGUEL    Care Management Team Member Elif Laws, MS, NCC, LPC   Patient/Family Present   Patient Present No   Patient's Family Present No   Will return to group home on Friday and will follow up with [facility], safety precautions in place, reviewed labs, denies all.

## 2024-07-03 NOTE — NURSING NOTE
"Patient visible in milieu, pleasant and cooperative in interaction. Social with staff and peers. Patient denies anxiety, endorses moderate depression, denies SI/HI, hallucinations. Remains medication compliant and on 7\" checks for safety and behaviors.  "

## 2024-07-03 NOTE — PROGRESS NOTES
07/01/24 0930   Team Meeting   Meeting Type Daily Rounds   Team Members Present   Team Members Present Physician;Nurse;   Physician Team Member Dr. Marietta MD; SHANI Rubio   Nursing Team Member Shira Glez, RN   Care Management Team Member Elif Laws, MS, NCC, LPC   Patient/Family Present   Patient Present No   Patient's Family Present No   Slept, paper clothes and finger foods, si to bang head on wall, high anxiety and depression, valproic level due, will return to [facility] when stable and follow up with [facility] for psych.    2

## 2024-07-03 NOTE — NURSING NOTE
Patient was observed to be visible in the community this evening.  She is social with both staff and peers.  Endorses high anxiety and depression.  Denies SI, HI and hallucinations. She became tearful later in the evening without reason, however was redirectable and able to remain in the milieu.  Denies any pain. Leydi was medication compliant at HS.  Positive for snack and fluids tonight. Continuous safety rounding in progress.

## 2024-07-03 NOTE — PROGRESS NOTES
"Progress Note - Behavioral Health   Leydi Khan 54 y.o. female MRN: [id number]  Unit/Bed#: [facility] 204-02 Encounter: [id number]    Assessment & Plan   Principal Problem:    Schizoaffective disorder, depressive type (HCC)  Active Problems:    Primary hypertension    Hyperlipidemia    Class 3 severe obesity due to excess calories without serious comorbidity in adult (HCC)    Chronic midline low back pain without sciatica    PTSD (post-traumatic stress disorder)    Borderline personality disorder (HCC)    Seizures (HCC)    Anemia    Bilateral leg edema      Behavior over the last 24 hours: richar improving  Sleep: normal  Appetite: normal  Medication side effects: No  ROS: no complaints    Subjective: Leydi was seen today for psychiatric follow-up.  Patient is visible in the dining area, social with peers. She reports moderate depression,  no recent thoughts of self harm and anxiety. Her mood is controlled on the unit. According to nursing staff report, patient is compliant with her current medication regimen and unit rules. She denied any SI/HI/AVH. She id not appear internally preoccupied.    Mental Status Evaluation:  Appearance:  age appropriate and overweight   Behavior:  Calm, pleasant   Speech:  normal pitch and normal volume   Mood:  \"depressed\"   Affect:  constricted   Thought Process:  goal directed   Associations: intact associations   Thought Content:  No overt delusions   Perceptual Disturbances: Denied AVH, did not appear internally preoccupied   Risk Potential: Suicidal Ideations none at present  Homicidal Ideations none at present  Potential for Aggression No   Sensorium:  person, place, and time/date   Memory:  recent and remote memory grossly intact   Consciousness:  alert and awake    Attention: attention span appeared shorter than expected for age   Insight:  limited   Judgment: poor   Gait/Station: Normal gait and station   Motor Activity: no abnormal movements     Progress Toward Goals: Unchanged. " Patient remains controlled on the unit with no recent self injurious behaviors. She is compliant with her current psychotropic medication regimen. She denied side effects from current psychotropic medication regimen. Will continue current psychotropic medication regimen. Anticipated discharge on 7/5/2024    Recommended Treatment: Continue with group therapy, milieu therapy and occupational therapy.      Risks, benefits and possible side effects of Medications:   Risks, benefits, and possible side effects of medications explained to patient and patient verbalizes understanding.      Medications: all current active meds have been reviewed.    Labs: I have personally reviewed all pertinent laboratory/tests results. Most Recent Labs:   Lab Results   Component Value Date    WBC 6.94 06/21/2024    RBC 3.81 06/21/2024    HGB 12.2 06/21/2024    HCT 37.1 06/21/2024     06/21/2024    RDW 14.3 06/21/2024    NEUTROABS 4.34 04/20/2024    SODIUM 137 06/21/2024    K 4.0 06/21/2024    CL 98 06/21/2024    CO2 30 06/21/2024    BUN 10 06/21/2024    CREATININE 0.42 (L) 06/21/2024    GLUC 114 06/21/2024    GLUF 114 (H) 06/21/2024    CALCIUM 9.4 06/21/2024    AST 14 06/21/2024    ALT 23 06/21/2024    ALKPHOS 55 06/21/2024    TP 7.0 06/21/2024    ALB 4.0 06/21/2024    TBILI 0.51 06/21/2024    CHOLESTEROL 168 06/21/2024    HDL 32 (L) 06/21/2024    TRIG 156 (H) 06/21/2024    LDLCALC 105 (H) 06/21/2024    NONHDLC 136 06/21/2024    VALPROICTOT 55 07/01/2024    CJS4UBWJRJBM 3.243 06/20/2024    FREET4 0.65 06/11/2024    PREGSERUM Negative 04/28/2022    RPR Non-Reactive 12/13/2022    HGBA1C 5.7 (H) 06/11/2024     06/11/2024       Counseling / Coordination of Care  Total floor / unit time spent today 25 minutes.

## 2024-07-03 NOTE — PLAN OF CARE
Problem: Ineffective Coping  Goal: Participates in unit activities  Description: Interventions:  - Provide therapeutic environment   - Provide required programming   - Redirect inappropriate behaviors   Outcome: Not Progressing   Remained withdrawn to room throughout AM. Did not attend RT AM group.

## 2024-07-03 NOTE — PLAN OF CARE
Problem: PAIN - ADULT  Goal: Verbalizes/displays adequate comfort level or baseline comfort level  Description: Interventions:  - Encourage patient to monitor pain and request assistance  - Assess pain using appropriate pain scale  - Administer analgesics based on type and severity of pain and evaluate response  - Implement non-pharmacological measures as appropriate and evaluate response  - Consider cultural and social influences on pain and pain management  - Notify physician/advanced practitioner if interventions unsuccessful or patient reports new pain  Outcome: Progressing     Problem: SAFETY ADULT  Goal: Patient will remain free of falls  Description: INTERVENTIONS:  - Educate patient/family on patient safety including physical limitations  - Instruct patient to call for assistance with activity   - Consult OT/PT to assist with strengthening/mobility   - Keep Call bell within reach  - Keep bed low and locked with side rails adjusted as appropriate  - Keep care items and personal belongings within reach  - Initiate and maintain comfort rounds  - Make Fall Risk Sign visible to staff  - Apply yellow socks and bracelet for high fall risk patients  - Consider moving patient to room near nurses station  Outcome: Progressing     Problem: Ineffective Coping  Goal: Demonstrates healthy coping skills  Outcome: Progressing  Goal: Participates in unit activities  Description: Interventions:  - Provide therapeutic environment   - Provide required programming   - Redirect inappropriate behaviors   Outcome: Progressing  Goal: Patient/Family participate in treatment and DC plans  Description: Interventions:  - Provide therapeutic environment  Outcome: Progressing  Goal: Patient/Family verbalizes awareness of resources  Outcome: Progressing  Goal: Understands least restrictive measures  Description: Interventions:  - Utilize least restrictive behavior  Outcome: Progressing  Goal: Free from restraint events  Description: -  Utilize least restrictive measures   - Provide behavioral interventions   - Redirect inappropriate behaviors   Outcome: Progressing     Problem: Risk for Self Injury/Neglect  Goal: Treatment Goal: Remain safe during length of stay, learn and adopt new coping skills, and be free of self-injurious ideation, impulses and acts at the time of discharge  Outcome: Progressing  Goal: Refrain from harming self  Description: Interventions:  - Monitor patient closely, per order  - Develop a trusting relationship  - Supervise medication ingestion, monitor effects and side effects   Outcome: Progressing  Goal: Attend and participate in unit activities, including therapeutic, recreational, and educational groups  Description: Interventions:  - Provide therapeutic and educational activities daily, encourage attendance and participation, and document same in the medical record  - Obtain collateral information, encourage visitation and family involvement in care   Outcome: Progressing     Problem: Depression  Goal: Verbalize thoughts and feelings  Description: Interventions:  - Assess and re-assess patient's level of risk   - Engage patient in 1:1 interactions, daily, for a minimum of 15 minutes   - Encourage patient to express feelings, fears, frustrations, hopes   Outcome: Progressing  Goal: Refrain from isolation  Description: Interventions:  - Develop a trusting relationship   - Encourage socialization   Outcome: Progressing  Goal: Refrain from self-neglect  Outcome: Progressing     Problem: Anxiety  Goal: Anxiety is at manageable level  Description: Interventions:  - Assess and monitor patient's anxiety level.   - Monitor for signs and symptoms (heart palpitations, chest pain, shortness of breath, headaches, nausea, feeling jumpy, restlessness, irritable, apprehensive).   - Collaborate with interdisciplinary team and initiate plan and interventions as ordered.  - [place] patient to unit/surroundings  - Explain treatment plan  -  Encourage participation in care  - Encourage verbalization of concerns/fears  - Identify coping mechanisms  - Assist in developing anxiety-reducing skills  - Administer/offer alternative therapies  - Limit or eliminate stimulants  Outcome: Progressing     Problem: Nutrition/Hydration-ADULT  Goal: Nutrient/Hydration intake appropriate for improving, restoring or maintaining nutritional needs  Description: Monitor and assess patient's nutrition/hydration status for malnutrition. Collaborate with interdisciplinary team and initiate plan and interventions as ordered.  Monitor patient's weight and dietary intake as ordered or per policy. Utilize nutrition screening tool and intervene as necessary. Determine patient's food preferences and provide high-protein, high-caloric foods as appropriate.     INTERVENTIONS:  - Monitor oral intake, urinary output, labs, and treatment plans  - Assess nutrition and hydration status and recommend course of action  - Evaluate amount of meals eaten  - Assist patient with eating if necessary   - Allow adequate time for meals  - Recommend/ encourage appropriate diets, oral nutritional supplements, and vitamin/mineral supplements  - Order, calculate, and assess calorie counts as needed  - Recommend, monitor, and adjust tube feedings and TPN/PPN based on assessed needs  - Assess need for intravenous fluids  - Provide specific nutrition/hydration education as appropriate  - Include patient/family/caregiver in decisions related to nutrition  Outcome: Progressing     Problem: Prexisting or High Potential for Compromised Skin Integrity  Goal: Skin integrity is maintained or improved  Description: INTERVENTIONS:  - Identify patients at risk for skin breakdown  - Assess and monitor skin integrity  - Assess and monitor nutrition and hydration status  - Monitor labs   - Assess for incontinence   - Turn and reposition patient  - Assist with mobility/ambulation  - Relieve pressure over bony  prominences  - Avoid friction and shearing  - Provide appropriate hygiene as needed including keeping skin clean and dry  - Evaluate need for skin moisturizer/barrier cream  - Collaborate with interdisciplinary team   - Patient/family teaching  - Consider wound care consult   Outcome: Progressing

## 2024-07-04 PROBLEM — F32.9 MAJOR DEPRESSIVE DISORDER: Status: RESOLVED | Noted: 2024-07-04 | Resolved: 2024-07-04

## 2024-07-04 PROBLEM — F32.9 MAJOR DEPRESSIVE DISORDER, SINGLE EPISODE, UNSPECIFIED: Status: ACTIVE | Noted: 2024-07-04

## 2024-07-04 PROCEDURE — 80164 ASSAY DIPROPYLACETIC ACD TOT: CPT

## 2024-07-04 PROCEDURE — 99232 SBSQ HOSP IP/OBS MODERATE 35: CPT | Performed by: PHYSICIAN ASSISTANT

## 2024-07-04 RX ORDER — POLYETHYLENE GLYCOL 3350 17 G/17G
17 POWDER, FOR SOLUTION ORAL DAILY
Status: DISCONTINUED | OUTPATIENT
Start: 2024-07-04 | End: 2024-07-04

## 2024-07-04 RX ADMIN — HYDROXYZINE HYDROCHLORIDE 50 MG: 50 TABLET, FILM COATED ORAL at 19:47

## 2024-07-04 RX ADMIN — GABAPENTIN 200 MG: 100 CAPSULE ORAL at 21:12

## 2024-07-04 RX ADMIN — ATORVASTATIN CALCIUM 10 MG: 10 TABLET, FILM COATED ORAL at 21:13

## 2024-07-04 RX ADMIN — RISPERIDONE 3 MG: 2 TABLET, FILM COATED ORAL at 17:20

## 2024-07-04 RX ADMIN — LISINOPRIL 20 MG: 20 TABLET ORAL at 08:36

## 2024-07-04 RX ADMIN — Medication 3 MG: at 21:13

## 2024-07-04 RX ADMIN — FUROSEMIDE 20 MG: 20 TABLET ORAL at 08:36

## 2024-07-04 RX ADMIN — PRAZOSIN HYDROCHLORIDE 2 MG: 1 CAPSULE ORAL at 21:12

## 2024-07-04 RX ADMIN — GABAPENTIN 200 MG: 100 CAPSULE ORAL at 17:26

## 2024-07-04 RX ADMIN — TRAZODONE HYDROCHLORIDE 50 MG: 50 TABLET ORAL at 21:13

## 2024-07-04 RX ADMIN — GABAPENTIN 200 MG: 100 CAPSULE ORAL at 08:36

## 2024-07-04 RX ADMIN — PANTOPRAZOLE SODIUM 20 MG: 20 TABLET, DELAYED RELEASE ORAL at 08:36

## 2024-07-04 RX ADMIN — DIVALPROEX SODIUM 1250 MG: 250 TABLET, DELAYED RELEASE ORAL at 21:12

## 2024-07-04 RX ADMIN — RISPERIDONE 3 MG: 2 TABLET, FILM COATED ORAL at 08:36

## 2024-07-04 RX ADMIN — SENNOSIDES AND DOCUSATE SODIUM 1 TABLET: 50; 8.6 TABLET ORAL at 21:13

## 2024-07-04 NOTE — NURSING NOTE
Pt was visible on the milieu this evening, social w/ peers. Pt endorsed depression this evening, denied all other psych symptoms. Pt was pleasant, cooperative, and med compliant. No SI or self-harming behaviors/ observed. Will CTM. Continuous pt safety checks ongoing.

## 2024-07-04 NOTE — PLAN OF CARE
Problem: Risk for Self Injury/Neglect  Goal: Refrain from harming self  Description: Interventions:  - Monitor patient closely, per order  - Develop a trusting relationship  - Supervise medication ingestion, monitor effects and side effects   7/4/2024 0258 by Charles Espinosa RN  Outcome: Progressing    Problem: Depression  Goal: Verbalize thoughts and feelings  Description: Interventions:  - Assess and re-assess patient's level of risk   - Engage patient in 1:1 interactions, daily, for a minimum of 15 minutes   - Encourage patient to express feelings, fears, frustrations, hopes   7/4/2024 0258 by Charles Espinosa RN  Outcome: Progressing    Problem: Depression  Goal: Refrain from isolation  Description: Interventions:  - Develop a trusting relationship   - Encourage socialization   Outcome: Progressing

## 2024-07-04 NOTE — NURSING NOTE
Patient is visible in milieu with peers and attended group therapy. Patient denies all, no anxiety, no depression, no SI/HI or hallucinations. Continued care and continual safety checks in progress.

## 2024-07-04 NOTE — PLAN OF CARE
Problem: Risk for Self Injury/Neglect  Goal: Refrain from harming self  Description: Interventions:  - Monitor patient closely, per order  - Develop a trusting relationship  - Supervise medication ingestion, monitor effects and side effects   7/4/2024 0255 by Charles Espinosa RN  Outcome: Progressing  7/4/2024 0255 by Charles Espinosa, RN  Outcome: Progressing     Problem: Depression  Goal: Verbalize thoughts and feelings  Description: Interventions:  - Assess and re-assess patient's level of risk   - Engage patient in 1:1 interactions, daily, for a minimum of 15 minutes   - Encourage patient to express feelings, fears, frustrations, hopes   Outcome: Progressing

## 2024-07-04 NOTE — PROGRESS NOTES
"Progress Note - Leydi Khan 54 y.o. female MRN: [id number]    Unit/Bed#: [facility] 204-02 Encounter: [id number]        Subjective:   Patient seen and examined at bedside after reviewing the chart and discussing the case with the caring staff.      Patient examined at bedside.  Patient reports no acute symptoms.    Patient is a possible discharge on Friday, 7/5/2024.    Physical Exam   Vitals: Blood pressure 126/57, pulse 82, temperature 97.5 °F (36.4 °C), temperature source Temporal, resp. rate 18, height 5' 4\" (1.626 m), weight 134 kg (296 lb 3.2 oz), SpO2 98%.,Body mass index is 50.84 kg/m².  Constitutional: Patient in no acute distress.  HEENT: PERR, EOMI, MMM.  Cardiovascular: Normal rate and regular rhythm.    Pulmonary/Chest: Effort normal and breath sounds normal.   Abdomen: Soft, + BS, NT.    Assessment/Plan:  Leydi Khan is a(n) 54 y.o. year old female with MDD.    Medical clearance.  Patient is medically cleared for discharge.  All scripts were sent for the patient.     Cardiac with hx of HTN, HLD.  Continue lisinopril 20 mg daily, atorvastatin 10 mg daily, Lasix 20 mg daily.  Seizure disorder.  Patient is on Depakote DR 1000 mg at bedtime.  DJD/OA/chronic low back pain.  Tylenol, lidocaine patch daily and Voltaren gel.  GERD.  Patient is on Protonix 20 mg daily.  Prediabetes.  Hgb A1c 5.7% on 6/11/24.  Lifestyle modifications.  Neuropathy.  Involving upper and lower extremities.  Increased gabapentin to 200 mg 3 times daily on 7/2/24.  Vitamin D deficiency.  Patient started on vitamin D bolus doses for 14 weeks followed by vitamin D3 1000 units daily.  Vitamin B12 deficiency.  Patient started on monthly B12 injections.  Constipation.  Patient started on Senokot S daily.  Dulcolax suppository as needed.  Leg swelling.  Compression stockings ordered.  Encouraged to elevate lower extremities and avoid sodium.  Cont to monitor.  "

## 2024-07-04 NOTE — BH TRANSITION RECORD
Contact Information: If you have any questions, concerns, pended studies, tests and/or procedures, or emergencies regarding your inpatient behavioral health visit. Please contact [facility] [phone] and ask to speak to a , nurse or physician. A contact is available 24 hours/ 7 days a week at this number.     Summary of Procedures Performed During your Stay:  Below is a list of major procedures performed during your hospital stay and a summary of results:  - Cardiac Procedures/Studies: EKG. NSR

## 2024-07-04 NOTE — PROGRESS NOTES
Progress Note - Behavioral Health     Leydi Khan 54 y.o. female MRN: [id number]   Unit/Bed#: [facility] 204-02 Encounter: [id number]      Documentation, nursing notes, medication reconciliation, labs, and vitals reviewed. Patient was seen for continuing care and reviewed with treatment team. No acute events over the past 24 hours.    Ashley seen today for psychiatric follow-up visit.  Staff reporting that patient has remained visible and social with peers.  Patient was endorsing some mild depression symptoms last evening but denies all other psych symptoms.  Has remained pleasant and cooperative and med compliant.  Denies any recent thoughts of hurting herself.  Patient states last time she had thoughts of hurting herself was 4 days ago.  Reporting that previous nightmares have subsided.  Depakote level pending for this afternoon.  Denies any SI, HI or AVH.  Does not appear internally preoccupied.  Sleep: normal  Appetite: normal  Medication side effects: No   ROS: all other systems are negative    Mental Status Evaluation:    Appearance:  age appropriate, adequate grooming, overweight   Behavior:  pleasant, calm   Speech:  normal rate and volume   Mood:  still somewhat depressed   Affect:  brighter   Thought Process:  goal directed   Associations: intact associations   Thought Content:  no overt delusions, preoccupied with getting a pencil   Perceptual Disturbances: denies auditory or visual hallucinations when asked, does not appear responding to internal stimuli   Risk Potential: Suicidal ideation - None at present  Homicidal ideation - None  Potential for aggression - No   Sensorium:  oriented to person, place, and time/date   Memory:  recent and remote memory grossly intact   Consciousness:  alert and awake   Attention: attention span and concentration appear shorter than expected for age   Insight:  limited   Judgment: limited   Gait/Station: normal gait/station   Motor Activity: no abnormal movements     Vital  signs in last 24 hours:    Temp:  [97.5 °F (36.4 °C)-97.7 °F (36.5 °C)] 97.5 °F (36.4 °C)  HR:  [82-90] 82  Resp:  [18] 18  BP: (121-142)/(57-65) 126/57    Laboratory results: I have personally reviewed all pertinent laboratory/tests results.  Most Recent Labs:   Lab Results   Component Value Date    WBC 6.94 06/21/2024    RBC 3.81 06/21/2024    HGB 12.2 06/21/2024    HCT 37.1 06/21/2024     06/21/2024    RDW 14.3 06/21/2024    TOTANEUTABS 6.87 06/20/2024    NEUTROABS 4.34 04/20/2024    SODIUM 137 06/21/2024    K 4.0 06/21/2024    CL 98 06/21/2024    CO2 30 06/21/2024    BUN 10 06/21/2024    CREATININE 0.42 (L) 06/21/2024    GLUC 114 06/21/2024    GLUF 114 (H) 06/21/2024    CALCIUM 9.4 06/21/2024    AST 14 06/21/2024    ALT 23 06/21/2024    ALKPHOS 55 06/21/2024    TP 7.0 06/21/2024    ALB 4.0 06/21/2024    TBILI 0.51 06/21/2024    CHOLESTEROL 168 06/21/2024    HDL 32 (L) 06/21/2024    TRIG 156 (H) 06/21/2024    LDLCALC 105 (H) 06/21/2024    NONHDLC 136 06/21/2024    VALPROICTOT 55 07/01/2024    [id number] 3.243 06/20/2024    FREET4 0.65 06/11/2024    PREGUR negative 11/06/2022    PREGSERUM Negative 04/28/2022    RPR Non-Reactive 12/13/2022    HGBA1C 5.7 (H) 06/11/2024     06/11/2024             Assessment & Plan   Principal Problem:    Schizoaffective disorder, depressive type (HCC)  Active Problems:    Primary hypertension    Hyperlipidemia    Class 3 severe obesity due to excess calories without serious comorbidity in adult (HCC)    Chronic midline low back pain without sciatica    PTSD (post-traumatic stress disorder)    Borderline personality disorder (HCC)    Seizures (HCC)    Anemia    Bilateral leg edema    Recommended Treatment:     Planned medication and treatment changes:    All current active medications have been reviewed  Encourage group therapy, milieu therapy and occupational therapy  Behavioral Health checks every 7 minutes  Discharge planning  Depakote level pending for this  afternoon.  No medication changes at this time.  Planned discharge for tomorrow 7/5/2024 back to group home.    Requires continued inpatient treatment due to chronic illness and high risk of decompensation if discharged before long term stability is achieved.      Current Facility-Administered Medications   Medication Dose Route Frequency Provider Last Rate    acetaminophen  650 mg Oral Q4H PRN Zita Pisano MD      acetaminophen  650 mg Oral Q4H PRN Zita Pisano MD      acetaminophen  975 mg Oral Q6H PRN Zita Pisano MD      aluminum-magnesium hydroxide-simethicone  30 mL Oral Q4H PRN Zita Pisano MD      ammonium lactate   Topical BID Sarah L Dagnall, PA-C      atorvastatin  10 mg Oral HS Sarah L Dagnall, PA-C      bisacodyl  10 mg Rectal Daily PRN Sudhir Jarquin MD      [START ON 9/21/2024] Cholecalciferol  1,000 Units Oral Daily Sudhir Jarquin MD      cyanocobalamin  1,000 mcg Intramuscular Q30 Days Sudhir Jarquin MD      divalproex sodium  1,250 mg Oral HS SHANI [place]      ergocalciferol  50,000 Units Oral Weekly Sudhir Jarquin MD      furosemide  20 mg Oral Daily Sarah L Dagnall, PA-C      gabapentin  200 mg Oral TID Sarah L Dagnall, PA-C      haloperidol lactate  5 mg Intramuscular Q4H PRN Max 4/day Zita Pisano MD      hydrOXYzine HCL  25 mg Oral Q6H PRN Max 4/day Zita Pisano MD      hydrOXYzine HCL  50 mg Oral Q6H PRN Max 4/day Zita Pisano MD      Ketotifen Fumarate  1 drop Both Eyes BID Sarah L Dagnall, PA-C      lisinopril  20 mg Oral Daily Sarah L Dagnall, PA-C      meclizine  12.5 mg Oral Q8H PRN Sarah L Roel, PA-C      melatonin  3 mg Oral HS Zita Pisano MD      nicotine polacrilex  4 mg Oral Q2H PRN Zita Pisano MD      nystatin   Topical BID Sarah L Dagnall, PA-C      pantoprazole  20 mg Oral Daily Sarah L Daglanel, PA-C      prazosin  2 mg Oral HS Jose Elias Aquino MD      propranolol  5 mg Oral Q8H PRN Zita Pisano,  MD      risperiDONE  0.25 mg Oral Q4H PRN Max 6/day Zita Pisano MD      risperiDONE  0.5 mg Oral Q4H PRN Max 3/day Zita Pisano MD      risperiDONE  1 mg Oral Q2H PRN Max 3/day Zita Pisano MD      risperiDONE  3 mg Oral BID Jose Elias Aquino MD      senna-docusate sodium  1 tablet Oral HS Sudhir Jarquin MD      traZODone  50 mg Oral Q6H PRN Max 3/day Zita Pisano MD      traZODone  50 mg Oral HS Jose Elias Aquino MD         Risks / Benefits of Treatment:    Risks, benefits, and possible side effects of medications explained to patient and patient verbalizes understanding and agreement for treatment.    Counseling / Coordination of Care:    Total floor / unit time spent today 20 minutes. Greater than 50% of total time was spent with the patient and / or family counseling and / or coordination of care. A description of counseling / coordination of care:  Patient's progress discussed with staff in treatment team meeting.  Medications, treatment progress and treatment plan reviewed with patient.    Lamine Mcmahon PA-C 07/04/24

## 2024-07-04 NOTE — DISCHARGE SUMMARY
"  Discharge Summary - Behavioral Health   Leydi Khan 54 y.o. female MRN: [id number]  Unit/Bed#: [facility] 204-02 Encounter: [id number]     Admission Date: 6/20/2024         Discharge Date: 7/5/2024    Attending Psychiatrist: Renato Mcmanus MD    Reason for Admission/HPI: Major depressive disorder, single episode, unspecified [F32.9]  Major depressive disorder [F32.9]    According to H&P of Dr. Aquino    Patient is a 54 y.o. female presented with a past psychiatric history of borderline personality disorder, schizoaffective disorder, depressive type, PTSD, intellectual disability, medically complicated by HTN, class III obesity, HLD, who was admitted to the inpatient adult psychiatric unit on a voluntary (201) commitment due to suicidal ideation, self-harming behavior.      Per Crisis Team on 6/20/2024  \"54 y.o female patient presented to the ED with SI and self harming behaviors. Pt resides at [facility]. Pt has a High Utilizer Plan. Pt presented to the ED last night for SI with no plan. Pt's SI was more behavioral than being an acute risk behaviors. Pt was discharged back to her group home as per [facility] recommendations. Pt's reported she has SI and self harmed herself today by taking a stapler and cut her arm in attempt to kill herself. Pt reported having a confrontation with another resident which is making her feel depressed and want to hurt herself. Pt stated she had a fight with her boyfriend today which made her upset. Pt denies any HI and A/V hallucinations. Pt has had multiple hospitalizations for mental health. Pt has outpatient Psychiatry and therapy with [facility]. Pt denies any legal and substance abuse issues. Pt is wiling to sign a 201. Provider is in agreement with this treatment plan.\"         Symptoms prior to presentation include several months of worsening depressive symptoms including decreased sleep, anhedonia, increased guilt and hopelessness, low energy, poor concentration, " "psychomotor retardation, and suicidal ideation. Leydi endorses a history of suicide attempts and suicidal gestures starting at the age of 18, and reports having attempted to end her life by cutting her wrist. She endorses increased anxiety over the last several months, and increased frequency of panic attacks (tearfulness, shortness of breath, chest pain, dizziness, sense of impending doom) over the past week. She endorses having night terrors several times per week. She endorses auditory hallucinations that are chronic. She denies substance abuse behavior, including alcohol. She denies a history of otoniel as described as decreased need for sleep, racing thoughts, pressured speech, and excess energy lasting for a period of days to weeks.     On initial evaluation, Leydi states that she brought herself to the ED because she was feeling suicidal at her group home. She states that the staff and peers at [facility] are verbally abusive to her, but is unable to cite a specific reason for the conflict at her group home. Further exploring her symptoms reveals that she is having chronic auditory hallucinations that are verbally abusive to the patient, and that \"only sometimes\" does she have conflict with her group home staff. She states that she would like to transfer to a different group home where her boyfriend also lives, and would still prefer not to return to [facility]. She endorses good response with Geodon and Zyprexa in the past in regards to lessening her auditory hallucinations, states that Clozapine caused intolerable dizziness, and that she has never tried Lithium before.    Hospital Course: The patient was admitted to the inpatient psychiatric unit and started on every 7 minutes precautions. During the hospitalization the patient was attending individual therapy, group therapy, milieu therapy and occupational therapy.    Psychiatric medications were titrated over the hospital stay. To address depressive " symptoms, mood instability, psychotic symptoms, nightmares, and insomnia the patient was started on mood stabilizer Depakote ER, antipsychotic medication Risperdal, anxiolytic medication Neurontin, hypnotic medication Trazodone and Melatonin, and medication to help with nightmares and flashbacks Prazosin. Medication doses were titrated during the hospital course. Prior to beginning of treatment medications risks and benefits and possible side effects including risk of liver impairment related to treatment with Depakote, risk of parkinsonian symptoms, Tardive Dyskinesia and metabolic syndrome related to treatment with antipsychotic medications, risk of cardiovascular events in elderly related to treatment with antipsychotic medications, and risk of impaired next-day mental alertness, complex sleep-related behavior and dependence related to treatment with hypnotic medications were reviewed with the patient. The patient verbalized understanding and agreement for treatment.     Patient's symptoms improved gradually over the hospital course. At the end of treatment the patient was doing well. Mood was stable at the time of discharge. The patient denied suicidal ideation, intent or plan at the time of discharge and denied homicidal ideation, intent or plan at the time of discharge. There was no overt psychosis at the time of discharge. Sleep and appetite were improved. The patient was tolerating medications and was not reporting any significant side effects at the time of discharge.    Since the patient was doing well at the end of the hospitalization, treatment team felt that the patient could be safely discharged to outpatient care.     The outpatient follow up with  [facility]  was arranged by the unit  upon discharge.    Mental Status at time of Discharge:     Appearance:  age appropriate and overweight   Behavior:  cooperative   Speech:  normal pitch and normal volume   Mood:   improved   Affect:  mood-congruent   Thought Process:  goal directed   Thought Content:  No overt delusions   Perceptual Disturbances: Denied AVH, did not appear internally preoccupied   Risk Potential: None at present    Sensorium:  person, place, and time/date   Cognition:  recent and remote memory grossly intact   Consciousness:  alert and awake    Attention: attention span appeared shorter than expected for age   Insight:  limited   Judgment: limited   Gait/Station: Seated in a chair   Motor Activity: no abnormal movements     Admission Diagnosis:Major depressive disorder, single episode, unspecified [F32.9]  Major depressive disorder [F32.9]    Discharge Diagnosis:   Principal Problem:    Schizoaffective disorder, depressive type ([facility])  Active Problems:    Primary hypertension    Hyperlipidemia    Class 3 severe obesity due to excess calories without serious comorbidity in adult ([facility])    Chronic midline low back pain without sciatica    PTSD (post-traumatic stress disorder)    Borderline personality disorder ([facility])    Seizures ([facility])    Anemia    Bilateral leg edema  Resolved Problems:    Major depressive disorder        Lab results:  Admission on 06/20/2024   Component Date Value    Sodium 06/21/2024 137     Potassium 06/21/2024 4.0     Chloride 06/21/2024 98     CO2 06/21/2024 30     ANION GAP 06/21/2024 9     BUN 06/21/2024 10     Creatinine 06/21/2024 0.42 (L)     Glucose 06/21/2024 114     Glucose, Fasting 06/21/2024 114 (H)     Calcium 06/21/2024 9.4     AST 06/21/2024 14     ALT 06/21/2024 23     Alkaline Phosphatase 06/21/2024 55     Total Protein 06/21/2024 7.0     Albumin 06/21/2024 4.0     Total Bilirubin 06/21/2024 0.51     eGFR 06/21/2024 117     WBC 06/21/2024 6.94     RBC 06/21/2024 3.81     Hemoglobin 06/21/2024 12.2     Hematocrit 06/21/2024 37.1     MCV 06/21/2024 97     MCH 06/21/2024 32.0     MCHC 06/21/2024 32.9     RDW 06/21/2024 14.3     MPV 06/21/2024 9.7     Platelets 06/21/2024 173      Vitamin B-12 06/21/2024 423     Folate 06/21/2024 10.2     Vit D, 25-Hydroxy 06/21/2024 13.6 (L)     Cholesterol 06/21/2024 168     Triglycerides 06/21/2024 156 (H)     HDL, Direct 06/21/2024 32 (L)     LDL Calculated 06/21/2024 105 (H)     Non-HDL-Chol (CHOL-HDL) 06/21/2024 136     Syphilis Total Antibody 06/21/2024 Non-reactive     Ventricular Rate 06/21/2024 77     Atrial Rate 06/21/2024 77     CA Interval 06/21/2024 176     QRSD Interval 06/21/2024 92     QT Interval 06/21/2024 380     QTC Interval 06/21/2024 430     P [place] 06/21/2024 19     QRS Axis 06/21/2024 39     T Wave [place] 06/21/2024 53     Valproic Acid, Total 06/27/2024 46 (L)     MRSA Culture Only 06/28/2024 No Methicillin Resistant Staphlyococcus aureus (MRSA) isolated     Valproic Acid, Total 07/01/2024 55        Discharge Medications:  Current Discharge Medication List        START taking these medications    Details   Cholecalciferol (VITAMIN D3) 1,000 units tablet Take 1 tablet (1,000 Units total) by mouth daily Do not start before September 21, 2024.  Qty: 30 tablet, Refills: 0    Associated Diagnoses: Vitamin D deficiency      nystatin (MYCOSTATIN) powder Apply topically 2 (two) times a day  Qty: 30 g, Refills: 0    Associated Diagnoses: Intertrigo      senna-docusate sodium (SENOKOT S) 8.6-50 mg per tablet Take 1 tablet by mouth daily at bedtime  Qty: 30 tablet, Refills: 0    Associated Diagnoses: Constipation              Current Discharge Medication List        STOP taking these medications       cephalexin (KEFLEX) 500 mg capsule Comments:   Reason for Stopping:         clonazePAM (KlonoPIN) 0.5 mg tablet Comments:   Reason for Stopping:         docusate sodium (COLACE) 100 mg capsule Comments:   Reason for Stopping:         benztropine (COGENTIN) 0.5 mg tablet Comments:   Reason for Stopping:         bisacodyl (DULCOLAX) 5 mg EC tablet Comments:   Reason for Stopping:         Cholecalciferol 100 MCG (4000 UT) CAPS Comments:   Reason for  Stopping:         clonazePAM (KlonoPIN) 1 mg tablet Comments:   Reason for Stopping:         desvenlafaxine succinate (PRISTIQ) 100 mg 24 hr tablet Comments:   Reason for Stopping:         Diclofenac Sodium (VOLTAREN) 1 % Comments:   Reason for Stopping:         escitalopram (LEXAPRO) 10 mg tablet Comments:   Reason for Stopping:         ibuprofen (MOTRIN) 600 mg tablet Comments:   Reason for Stopping:         lidocaine (LIDODERM) 5 % Comments:   Reason for Stopping:         loperamide (IMODIUM) 2 mg capsule Comments:   Reason for Stopping:         loperamide (IMODIUM) 2 mg capsule Comments:   Reason for Stopping:         nicotine polacrilex (NICORETTE) 2 mg gum Comments:   Reason for Stopping:         ondansetron (ZOFRAN-ODT) 4 mg disintegrating tablet Comments:   Reason for Stopping:         polyethylene glycol (MiraLax) 17 GM/SCOOP powder Comments:   Reason for Stopping:                Current Discharge Medication List        CONTINUE these medications which have CHANGED    Details   ammonium lactate (LAC-HYDRIN) 12 % lotion Apply topically 2 (two) times a day  Qty: 225 g, Refills: 0    Associated Diagnoses: Dry skin      atorvastatin (LIPITOR) 10 mg tablet Take 1 tablet (10 mg total) by mouth daily at bedtime  Qty: 30 tablet, Refills: 0    Associated Diagnoses: Mixed hyperlipidemia      divalproex sodium (DEPAKOTE) 250 mg DR tablet Take 5 tablets (1,250 mg total) by mouth daily at bedtime  Qty: 150 tablet, Refills: 0    Associated Diagnoses: Schizoaffective disorder, depressive type (HCC)      ergocalciferol (VITAMIN D2) 50,000 units Take 1 capsule (50,000 Units total) by mouth once a week for 12 doses Do not start before July 6, 2024.  Qty: 12 capsule, Refills: 0    Associated Diagnoses: Vitamin D deficiency      furosemide (LASIX) 20 mg tablet Take 1 tablet (20 mg total) by mouth daily  Qty: 30 tablet, Refills: 0    Associated Diagnoses: Bilateral leg edema      gabapentin (NEURONTIN) 100 mg capsule Take 2  capsules (200 mg total) by mouth 3 (three) times a day  Qty: 180 capsule, Refills: 0    Associated Diagnoses: Generalized anxiety disorder      lisinopril (ZESTRIL) 20 mg tablet Take 1 tablet (20 mg total) by mouth daily  Qty: 30 tablet, Refills: 0    Associated Diagnoses: Primary hypertension      melatonin 3 mg Take 1 tablet (3 mg total) by mouth daily at bedtime  Qty: 30 tablet, Refills: 0    Associated Diagnoses: Insomnia      pantoprazole (PROTONIX) 20 mg tablet Take 1 tablet (20 mg total) by mouth daily  Qty: 30 tablet, Refills: 0    Associated Diagnoses: Atypical chest pain      prazosin (MINIPRESS) 2 mg capsule Take 1 capsule (2 mg total) by mouth daily at bedtime  Qty: 30 capsule, Refills: 0    Associated Diagnoses: Major depression with psychotic features ([facility])      risperiDONE (RisperDAL) 3 mg tablet Take 1 tablet (3 mg total) by mouth 2 (two) times a day  Qty: 60 tablet, Refills: 0    Associated Diagnoses: Schizoaffective disorder, depressive type (HCC)      traZODone (DESYREL) 50 mg tablet Take 1 tablet (50 mg total) by mouth daily at bedtime  Qty: 30 tablet, Refills: 0    Associated Diagnoses: Insomnia              Current Discharge Medication List        CONTINUE these medications which have NOT CHANGED    Details   Incontinence Supply Disposable (Depend Adjustable Underwear) MISC Use as needed (as needed) Depends 3XL  Qty: 1 each, Refills: 3    Associated Diagnoses: Incontinence in female      loratadine (CLARITIN) 10 mg tablet Take 1 tablet (10 mg total) by mouth daily  Qty: 30 tablet, Refills: 0    Associated Diagnoses: Seasonal allergic rhinitis due to pollen      acetaminophen (TYLENOL) 325 mg tablet Take 2 tablets (650 mg total) by mouth every 4 (four) hours as needed for mild pain  Qty: 90 tablet, Refills: 0    Associated Diagnoses: Chronic midline low back pain without sciatica      meclizine (ANTIVERT) 12.5 MG tablet Take 1 tablet (12.5 mg total) by mouth every 8 (eight) hours as needed for  dizziness  Qty: 30 tablet, Refills: 0    Associated Diagnoses: Dizziness              Discharge instructions/Information to patient and family:   See after visit summary for information provided to patient and family.      Provisions for Follow-Up Care:  See after visit summary for information related to follow-up care and any pertinent home health orders.      Discharge Statement     I spent 30 minutes discharging the patient. This time was spent on the day of discharge. I had direct contact with the patient on the day of discharge.     Additional documentation is required if more than 30 minutes were spent on discharge:    I reviewed with Leydi importance of compliance with medications and outpatient treatment after discharge.  I discussed the medication regimen and possible side effects of the medications with Leydi prior to discharge. At the time of discharge she was tolerating psychiatric medications.  I discussed outpatient follow up with Leydi.  I reviewed with Leydi crisis plan and safety plan upon discharge.

## 2024-07-05 VITALS
SYSTOLIC BLOOD PRESSURE: 121 MMHG | RESPIRATION RATE: 18 BRPM | HEIGHT: 64 IN | HEART RATE: 84 BPM | DIASTOLIC BLOOD PRESSURE: 61 MMHG | OXYGEN SATURATION: 98 % | WEIGHT: 293 LBS | BODY MASS INDEX: 50.02 KG/M2 | TEMPERATURE: 97.5 F

## 2024-07-05 LAB — VALPROATE SERPL-MCNC: 51 UG/ML (ref 50–100)

## 2024-07-05 PROCEDURE — 99238 HOSP IP/OBS DSCHRG MGMT 30/<: CPT

## 2024-07-05 RX ADMIN — FUROSEMIDE 20 MG: 20 TABLET ORAL at 08:36

## 2024-07-05 RX ADMIN — GABAPENTIN 200 MG: 100 CAPSULE ORAL at 08:36

## 2024-07-05 RX ADMIN — PANTOPRAZOLE SODIUM 20 MG: 20 TABLET, DELAYED RELEASE ORAL at 08:36

## 2024-07-05 RX ADMIN — RISPERIDONE 3 MG: 2 TABLET, FILM COATED ORAL at 08:36

## 2024-07-05 RX ADMIN — LISINOPRIL 20 MG: 20 TABLET ORAL at 08:36

## 2024-07-05 RX ADMIN — HYDROXYZINE HYDROCHLORIDE 50 MG: 50 TABLET, FILM COATED ORAL at 10:31

## 2024-07-05 NOTE — NURSING NOTE
Patient visible in chair by nurses station. Pleasant and cooperative. Atarax noted to have positive results.. Decreased anxiety noted. Denies SI,HI,AVH. Safety checks continue Q 7 minutes.

## 2024-07-05 NOTE — PROGRESS NOTES
"Progress Note - Leydi Khan 54 y.o. female MRN: [id number]    Unit/Bed#: [facility] 204-02 Encounter: [id number]        Subjective:   Patient seen and examined at bedside after reviewing the chart and discussing the case with the caring staff.      Patient examined at bedside.  Patient reports no acute symptoms.    Patient is being discharged today, Friday, 7/5/2024.    Physical Exam   Vitals: Blood pressure 121/61, pulse 84, temperature 97.5 °F (36.4 °C), temperature source Temporal, resp. rate 18, height 5' 4\" (1.626 m), weight 134 kg (296 lb 3.2 oz), SpO2 98%.,Body mass index is 50.84 kg/m².  Constitutional: Patient in no acute distress.  HEENT: PERR, EOMI, MMM.  Cardiovascular: Normal rate and regular rhythm.    Pulmonary/Chest: Effort normal and breath sounds normal.   Abdomen: Soft, + BS, NT.    Assessment/Plan:  Leydi Khan is a(n) 54 y.o. year old female with MDD.    Medical clearance.  Patient is medically cleared for discharge.  All scripts were sent for the patient.     Cardiac with hx of HTN, HLD.  Continue lisinopril 20 mg daily, atorvastatin 10 mg daily, Lasix 20 mg daily.  Seizure disorder.  Patient is on Depakote DR 1000 mg at bedtime.  DJD/OA/chronic low back pain.  Tylenol, lidocaine patch daily and Voltaren gel.  GERD.  Patient is on Protonix 20 mg daily.  Prediabetes.  Hgb A1c 5.7% on 6/11/24.  Lifestyle modifications.  Neuropathy.  Involving upper and lower extremities.  Increased gabapentin to 200 mg 3 times daily on 7/2/24.  Vitamin D deficiency.  Patient started on vitamin D bolus doses for 14 weeks followed by vitamin D3 1000 units daily.  Vitamin B12 deficiency.  Patient started on monthly B12 injections.  Constipation.  Patient started on Senokot S daily.  Dulcolax suppository as needed.  Leg swelling.  Compression stockings ordered.  Encouraged to elevate lower extremities and avoid sodium.  Cont to monitor.    The patient was discussed with Dr. Jarquin and he is in agreement with the above " note.

## 2024-07-05 NOTE — PROGRESS NOTES
07/05/24    Team Meeting   Meeting Type Daily Rounds   Team Members Present   Team Members Present Physician;Speech Language Pathologist;Nurse;;Occupational Therapist   Physician Team Member Dr Marietta MCLEOD   Nursing Team Member Shira Glez RN   Social Work Team Member BRITT Iqbal   Patient/Family Present   Patient Present No   Patient's Family Present No   PRN Atarax, baseline, d/c today [facility], med complaint

## 2024-07-05 NOTE — NURSING NOTE
Patient is visible in milieu with peers, medication compliant and cooperative. Patient has moderate to high anxiety and requested Atarax 50 mg PRN which she received at 1031 for a Smith of 25. Patient endorses moderate depression, denies SI/HI and hallucinations. Patient is set to discharge today at 1200. Continued care and continual safety rounds in progress.

## 2024-07-05 NOTE — SOCIAL WORK
CM met with pt to review d/c plan and f/u supports. IMM explained to pt who expressed verbal confirmation of understanding and in agreement. Pt declined the right to appeal d/c at this time. Pt signed IMM and verbalized readiness to d/c home today.

## 2024-07-05 NOTE — NURSING NOTE
Patient in hallway crying. Increased anxiety noted, Patient does not want to go home tomorrow. Smith score 20. Atarax 50 given at 1947.

## 2024-07-05 NOTE — PLAN OF CARE
Problem: PAIN - ADULT  Goal: Verbalizes/displays adequate comfort level or baseline comfort level  Description: Interventions:  - Encourage patient to monitor pain and request assistance  - Assess pain using appropriate pain scale  - Administer analgesics based on type and severity of pain and evaluate response  - Implement non-pharmacological measures as appropriate and evaluate response  - Consider cultural and social influences on pain and pain management  - Notify physician/advanced practitioner if interventions unsuccessful or patient reports new pain  Outcome: Completed     Problem: SAFETY ADULT  Goal: Patient will remain free of falls  Description: INTERVENTIONS:  - Educate patient/family on patient safety including physical limitations  - Instruct patient to call for assistance with activity   - Consult OT/PT to assist with strengthening/mobility   - Keep Call bell within reach  - Keep bed low and locked with side rails adjusted as appropriate  - Keep care items and personal belongings within reach  - Initiate and maintain comfort rounds  - Make Fall Risk Sign visible to staff  - Offer Toileting every 2 Hours, in advance of need  - Initiate/Maintain bed alarm  - Obtain necessary fall risk management equipment:   Problem: Alteration in Thoughts and Perception  Goal: Treatment Goal: Gain control of psychotic behaviors/thinking, reduce/eliminate presenting symptoms and demonstrate improved reality functioning upon discharge  Outcome: Completed  Goal: Refrain from acting on delusional thinking/internal stimuli  Description: Interventions:  - Monitor patient closely, per order   - Utilize least restrictive measures   - Set reasonable limits, give positive feedback for acceptable   - Administer medications as ordered and monitor of potential side effects  Outcome: Completed  Goal: Agree to be compliant with medication regime, as prescribed and report medication side effects  Description: Interventions:  - Offer  appropriate PRN medication and supervise ingestion; conduct AIMS, as needed   Outcome: Completed  Goal: Recognize dysfunctional thoughts, communicate reality-based thoughts at the time of discharge  Description: Interventions:  - Provide medication and psycho-education to assist patient in compliance and developing insight into his/her illness   Outcome: Completed     Problem: Ineffective Coping  Goal: Identifies ineffective coping skills  Outcome: Completed  Goal: Identifies healthy coping skills  Outcome: Completed  Goal: Demonstrates healthy coping skills  Outcome: Completed  Goal: Participates in unit activities  Description: Interventions:  - Provide therapeutic environment   - Provide required programming   - Redirect inappropriate behaviors   Outcome: Completed  Goal: Patient/Family participate in treatment and DC plans  Description: Interventions:  - Provide therapeutic environment  Outcome: Completed  Goal: Patient/Family verbalizes awareness of resources  Outcome: Completed  Goal: Understands least restrictive measures  Description: Interventions:  - Utilize least restrictive behavior  Outcome: Completed  Goal: Free from restraint events  Description: - Utilize least restrictive measures   - Provide behavioral interventions   - Redirect inappropriate behaviors   Outcome: Completed     Problem: Risk for Self Injury/Neglect  Goal: Treatment Goal: Remain safe during length of stay, learn and adopt new coping skills, and be free of self-injurious ideation, impulses and acts at the time of discharge  Outcome: Completed  Goal: Refrain from harming self  Description: Interventions:  - Monitor patient closely, per order  - Develop a trusting relationship  - Supervise medication ingestion, monitor effects and side effects   Outcome: Completed  Goal: Attend and participate in unit activities, including therapeutic, recreational, and educational groups  Description: Interventions:  - Provide therapeutic and  educational activities daily, encourage attendance and participation, and document same in the medical record  - Obtain collateral information, encourage visitation and family involvement in care   Outcome: Completed  Goal: Recognize maladaptive responses and adopt new coping mechanisms  Outcome: Completed  Goal: Complete daily ADLs, including personal hygiene independently, as able  Description: Interventions:  - Observe, teach, and assist patient with ADLS  - Monitor and promote a balance of rest/activity, with adequate nutrition and elimination  Outcome: Completed     Problem: Depression  Goal: Treatment Goal: Demonstrate behavioral control of depressive symptoms, verbalize feelings of improved mood/affect, and adopt new coping skills prior to discharge  Outcome: Completed  Goal: Verbalize thoughts and feelings  Description: Interventions:  - Assess and re-assess patient's level of risk   - Engage patient in 1:1 interactions, daily, for a minimum of 15 minutes   - Encourage patient to express feelings, fears, frustrations, hopes   Outcome: Completed  Goal: Refrain from isolation  Description: Interventions:  - Develop a trusting relationship   - Encourage socialization   Outcome: Completed  Goal: Refrain from self-neglect  Outcome: Completed     Problem: Anxiety  Goal: Anxiety is at manageable level  Description: Interventions:  - Assess and monitor patient's anxiety level.   - Monitor for signs and symptoms (heart palpitations, chest pain, shortness of breath, headaches, nausea, feeling jumpy, restlessness, irritable, apprehensive).   - Collaborate with interdisciplinary team and initiate plan and interventions as ordered.  - [place] patient to unit/surroundings  - Explain treatment plan  - Encourage participation in care  - Encourage verbalization of concerns/fears  - Identify coping mechanisms  - Assist in developing anxiety-reducing skills  - Administer/offer alternative therapies  - Limit or eliminate  stimulants  Outcome: Completed     Problem: Nutrition/Hydration-ADULT  Goal: Nutrient/Hydration intake appropriate for improving, restoring or maintaining nutritional needs  Description: Monitor and assess patient's nutrition/hydration status for malnutrition. Collaborate with interdisciplinary team and initiate plan and interventions as ordered.  Monitor patient's weight and dietary intake as ordered or per policy. Utilize nutrition screening tool and intervene as necessary. Determine patient's food preferences and provide high-protein, high-caloric foods as appropriate.     INTERVENTIONS:  - Monitor oral intake, urinary output, labs, and treatment plans  - Assess nutrition and hydration status and recommend course of action  - Evaluate amount of meals eaten  - Assist patient with eating if necessary   - Allow adequate time for meals  - Recommend/ encourage appropriate diets, oral nutritional supplements, and vitamin/mineral supplements  - Order, calculate, and assess calorie counts as needed  - Recommend, monitor, and adjust tube feedings and TPN/PPN based on assessed needs  - Assess need for intravenous fluids  - Provide specific nutrition/hydration education as appropriate  - Include patient/family/caregiver in decisions related to nutrition  Outcome: Completed     Problem: Prexisting or High Potential for Compromised Skin Integrity  Goal: Skin integrity is maintained or improved  Description: INTERVENTIONS:  - Identify patients at risk for skin breakdown  - Assess and monitor skin integrity  - Assess and monitor nutrition and hydration status  - Monitor labs   - Assess for incontinence   - Turn and reposition patient  - Assist with mobility/ambulation  - Relieve pressure over bony prominences  - Avoid friction and shearing  - Provide appropriate hygiene as needed including keeping skin clean and dry  - Evaluate need for skin moisturizer/barrier cream  - Collaborate with interdisciplinary team   - Patient/family  teaching  - Consider wound care consult   Outcome: Completed     - Apply yellow socks and bracelet for high fall risk patients  - Consider moving patient to room near nurses station  Outcome: Completed

## 2024-07-09 ENCOUNTER — DOCUMENTATION (OUTPATIENT)
Dept: CASE MANAGEMENT | Facility: HOSPITAL | Age: 54
End: 2024-07-09

## 2024-07-09 NOTE — BEHAVIORAL HEALTH HIGH UTILIZER
Patient Name:Leydi Khan MRN:  [id number]         : 1970     Age: 54 y.o.    Sex: female   Utilization History:  (# of ED visits & IP admits; reasons)  Pt had 14 [facility]-ED visits from 2024-2024. ED presentations were related to SI's with no plan or a plan to cut her wrists or throat, jump or walk in front of a car or traffic, hang herself, bang her head or drink bleach.  Presentations were also related to AH, feelings that no one loves or cares about her, boyfriend break-ups, complaints about her group home and needs not being met, self-injurious superficial scratches or scrapes with pens/forks/paperclips/thumbtack/stapler, banging head and calling 911 on herself from her group home setting.     Medical presentations were related to back pain, chest pain, pain from falling, generalized body pain, UTI symptoms, bilateral leg swelling, dizziness.     Pt had multiple 30-day behavioral health readmissions.    Treatment Recommendations & Presentation:  Presentation in the ED: Pt will typically call 911 for EMS/police to bring her to ED's or is accompanied by group home staff. ED visits were related to SI's with no plan or a plan to cut her wrists or throat, jump or walk in front of a car or traffic, hang herself, bang her head or drink bleach. Visits were also related to AH, feelings that no one loves or cares about her, boyfriend break-ups, complaints about her group home and needs not being met, self-injurious superficial scratches or scrapes with pens/forks/paperclips/stapler, banging head and calling 911 on herself from her group home setting.  Medical visits were related to back pain, chest pain, pain from falling, generalized body pain, UTI symptoms, bilateral leg swelling, dizziness.  Pt has superficially scratched or poked herself with a thumbtack on multiple occasions and has also utilized a plastic fork to scratch herself. Pt also states she does not like living at her group home and [facility]   Pt will call 911 on herself in order for EMS to bring her to ED's and tends to do this at certain times when group home staffing is more limited. If pt's boyfriend is being hospitalized then pt also wants hospitalization. Pt may seek hospitalization because she spent her allowance and is frustrated.                ED Recommendations: Following medical evaluation and treatment, allow pt time for de-escalation and for provider to establish acute risk versus pt's chronic behavioral disturbances.  Contact [facility] to develop a safe discharge plan back to [facility] [phone]. Specific contacts are: Letitia Lucia - Supervisor, Care Coordinator [phone] or Fabio - Care Coordinator [phone] or Supervisor BRITT Betts - [phone]. [phone], or Therapist Dana from [facility] at [phone].        Home Medication Regimen: See most recent documentation in Epic, can verify medication with staff or nurse from [facility] [phone].          Recent Medical Work Ups:  (include Psych testing or ECT)     EKG's - 2023, 2024   Labs - 2023, 2024 Inpatient Recommendations: Collaborate with pt's community sources to develop a comprehensive discharge plan.              Outpatient recommendations: Pt is to continue with treatment at [facility]. [facility] staff, care coordinators, and therapist should develop a crisis plan with pt and healthy coping skills in response to her distressful feelings.          Situation/Relevant Background Info:  Pt is a 54 year old female with Cognitive Impairment, Bipolar I Disorder, and Borderline Personality Disorder along with an extensive history of behavioral health inpatient admissions.  Pt resides in a group home setting at [facility]. Pt also has Care Coordinators and receives therapy and psychiatric care through the [facility].   Pt appears to derive secondary gains from ED visits/encounters as well inpatient behavioral health (BH) admissions; therefore, group home staff has advised hospital professionals that pt should have brief behavioral health stays/hospitalizations.   Pt states that she has a boyfriend who is a support system for her; staff confirms that pt has a boyfriend who also lives at [facility] and she will often mirror his issues - pt attempts to be hospitalized when her boyfriend is being hospitalized. Pt's romantic interests may change to other individuals at her group home.  Upon BH admission, Pt often states that she does not want to be in her group home and does not want to be discharged back to her group home. Pt has a possibility of another living situation as per TIP coordinator but pt has to be stable in the community.  Pt was moved from [facility] to [facility] in 2023 and is still getting used to her new staff and housing situation. Therapist Dana states pt's most recent  admit 11/2023 was due to pt spending her allowance on take out food and then being very upset that money was gone. They intend to work on her budgeting issues.                     Diagnoses/Significant Problems (Medical & Psychiatric):      Psychiatric:  Bipolar 1 Disorder, MDD, PTSD, Borderline Personality Disorder     Medical: Hyponatremia (HCC), Seizures (HCC), Obesity, Hyperlipidemia, HTN, Cognitive Impairment            Drivers of repeated utilization:   Secondary gains from ED visits and inpatient BH stays, impulsivity, limited coping skills, attention seeking behaviors, wanting to be in hospital if her boyfriend is hospitalized, personality disordered behaviors, unhappy with group home.                                                  Existing Community Resources & Supports:                 Little to no family involvement.   Pt will state she has a boyfriend    Patient Medical &  Psychiatric Care Team:  [facility] [place] [phone] or [phone]  [facility] [phone]  Therapist Dana from [facility] - [phone]    [facility] - [phone]   Endocrinology, [place] - [phone]  [facility] - [phone] ext. [phone], supervisor     Care plan date: 07/09/24                   Author:  Brianna Coleman RN                 Date reviewed with patient:

## 2024-07-10 ENCOUNTER — RA CDI HCC (OUTPATIENT)
Dept: OTHER | Facility: HOSPITAL | Age: 54
End: 2024-07-10

## 2024-07-10 DIAGNOSIS — G89.29 CHRONIC MIDLINE LOW BACK PAIN WITHOUT SCIATICA: ICD-10-CM

## 2024-07-10 DIAGNOSIS — M54.50 CHRONIC MIDLINE LOW BACK PAIN WITHOUT SCIATICA: ICD-10-CM

## 2024-07-10 NOTE — TELEPHONE ENCOUNTER
Jaqueline from [facility] called asking for refills to be sent for the PRN tylenol and loperamide to [facility] pharmacy.    thank you

## 2024-07-11 RX ORDER — ACETAMINOPHEN 325 MG/1
650 TABLET ORAL EVERY 4 HOURS PRN
Qty: 90 TABLET | Refills: 0 | Status: SHIPPED | OUTPATIENT
Start: 2024-07-11

## 2024-07-12 ENCOUNTER — HOSPITAL ENCOUNTER (EMERGENCY)
Facility: HOSPITAL | Age: 54
End: 2024-07-13
Attending: EMERGENCY MEDICINE
Payer: MEDICARE

## 2024-07-12 DIAGNOSIS — R45.851 SUICIDAL IDEATION: ICD-10-CM

## 2024-07-12 DIAGNOSIS — R45.851 DEPRESSION WITH SUICIDAL IDEATION: Primary | ICD-10-CM

## 2024-07-12 DIAGNOSIS — F32.A DEPRESSION WITH SUICIDAL IDEATION: Primary | ICD-10-CM

## 2024-07-12 LAB
AMPHETAMINES SERPL QL SCN: NEGATIVE
BARBITURATES UR QL: NEGATIVE
BENZODIAZ UR QL: NEGATIVE
COCAINE UR QL: NEGATIVE
ETHANOL EXG-MCNC: 0 MG/DL
EXT PREGNANCY TEST URINE: NEGATIVE
EXT. CONTROL: NORMAL
FENTANYL UR QL SCN: NEGATIVE
HYDROCODONE UR QL SCN: NEGATIVE
METHADONE UR QL: NEGATIVE
OPIATES UR QL SCN: NEGATIVE
OXYCODONE+OXYMORPHONE UR QL SCN: NEGATIVE
PCP UR QL: NEGATIVE
THC UR QL: NEGATIVE

## 2024-07-12 PROCEDURE — G0427 INPT/ED TELECONSULT70: HCPCS | Performed by: PSYCHIATRY & NEUROLOGY

## 2024-07-12 PROCEDURE — 99285 EMERGENCY DEPT VISIT HI MDM: CPT

## 2024-07-12 PROCEDURE — 82075 ASSAY OF BREATH ETHANOL: CPT | Performed by: EMERGENCY MEDICINE

## 2024-07-12 PROCEDURE — 81025 URINE PREGNANCY TEST: CPT | Performed by: EMERGENCY MEDICINE

## 2024-07-12 PROCEDURE — 99285 EMERGENCY DEPT VISIT HI MDM: CPT | Performed by: EMERGENCY MEDICINE

## 2024-07-12 PROCEDURE — 80307 DRUG TEST PRSMV CHEM ANLYZR: CPT | Performed by: EMERGENCY MEDICINE

## 2024-07-12 RX ORDER — LANOLIN ALCOHOL/MO/W.PET/CERES
3 CREAM (GRAM) TOPICAL
Status: DISCONTINUED | OUTPATIENT
Start: 2024-07-12 | End: 2024-07-13 | Stop reason: HOSPADM

## 2024-07-12 RX ORDER — LORAZEPAM 1 MG/1
1 TABLET ORAL ONCE
Status: COMPLETED | OUTPATIENT
Start: 2024-07-12 | End: 2024-07-12

## 2024-07-12 RX ORDER — DIVALPROEX SODIUM 500 MG/1
1250 TABLET, EXTENDED RELEASE ORAL
COMMUNITY

## 2024-07-12 RX ORDER — ATORVASTATIN CALCIUM 10 MG/1
10 TABLET, FILM COATED ORAL
Status: DISCONTINUED | OUTPATIENT
Start: 2024-07-13 | End: 2024-07-13 | Stop reason: HOSPADM

## 2024-07-12 RX ORDER — LISINOPRIL 20 MG/1
20 TABLET ORAL DAILY
Status: DISCONTINUED | OUTPATIENT
Start: 2024-07-13 | End: 2024-07-13 | Stop reason: HOSPADM

## 2024-07-12 RX ORDER — PRAZOSIN HYDROCHLORIDE 1 MG/1
2 CAPSULE ORAL
Status: DISCONTINUED | OUTPATIENT
Start: 2024-07-12 | End: 2024-07-13 | Stop reason: HOSPADM

## 2024-07-12 RX ORDER — DIVALPROEX SODIUM 250 MG/1
1250 TABLET, DELAYED RELEASE ORAL
Status: DISCONTINUED | OUTPATIENT
Start: 2024-07-12 | End: 2024-07-12

## 2024-07-12 RX ORDER — GABAPENTIN 100 MG/1
200 CAPSULE ORAL 3 TIMES DAILY
Status: DISCONTINUED | OUTPATIENT
Start: 2024-07-12 | End: 2024-07-13 | Stop reason: HOSPADM

## 2024-07-12 RX ORDER — PANTOPRAZOLE SODIUM 20 MG/1
20 TABLET, DELAYED RELEASE ORAL
Status: DISCONTINUED | OUTPATIENT
Start: 2024-07-13 | End: 2024-07-13 | Stop reason: HOSPADM

## 2024-07-12 RX ORDER — FUROSEMIDE 20 MG/1
20 TABLET ORAL DAILY
Status: DISCONTINUED | OUTPATIENT
Start: 2024-07-13 | End: 2024-07-13 | Stop reason: HOSPADM

## 2024-07-12 RX ORDER — TRAZODONE HYDROCHLORIDE 50 MG/1
50 TABLET ORAL
Status: DISCONTINUED | OUTPATIENT
Start: 2024-07-12 | End: 2024-07-13 | Stop reason: HOSPADM

## 2024-07-12 RX ADMIN — GABAPENTIN 200 MG: 100 CAPSULE ORAL at 23:57

## 2024-07-12 RX ADMIN — PRAZOSIN HYDROCHLORIDE 2 MG: 1 CAPSULE ORAL at 23:57

## 2024-07-12 RX ADMIN — Medication 3 MG: at 23:57

## 2024-07-12 RX ADMIN — LORAZEPAM 1 MG: 1 TABLET ORAL at 17:56

## 2024-07-12 RX ADMIN — TRAZODONE HYDROCHLORIDE 50 MG: 50 TABLET ORAL at 23:57

## 2024-07-12 NOTE — ED NOTES
"54 y.o female patient presented from the ED via EMS for SI.  Pt has a Behavioral Health High Utilizer Plan. Pt is a resident of [facility]/ [facility].   Pt reported she was recently discharged from [facility] on 7/05/24. Pt stated she has been refusing her medications but took her afternoon dosage.  Pt reports anxiety and depression and requesting medications for her mood.  Pt reported having SI of wanting to walk into traffic.  Pt stated having Auditory Hallucinations of voices telling her she is no good, ugly and worthless. Pt was asked in the voices told her to walk into traffic.  Pt answered \"no\". Pt stated her recent inpatient stay was fair and felt it did not help her. Pt denies any HI or visual hallucinations. Pt has a history multiple inpatient hospitalizations admissions.  Pt has outpatient Psychiatry and Therapy with [facility]. Pt denies any legal and substance abuse issues.   "

## 2024-07-12 NOTE — ED NOTES
Summary: Behavioral Health High Utilizer Careplan    Patient Name:Leydi Khan MRN:  [id number]         : 1970     Age: 54 y.o.    Sex: female   Utilization History:  (# of ED visits & IP admits; reasons)  Pt had 14 [facility]-ED visits from 2024-2024. ED presentations were related to SI's with no plan or a plan to cut her wrists or throat, jump or walk in front of a car or traffic, hang herself, bang her head or drink bleach.  Presentations were also related to AH, feelings that no one loves or cares about her, boyfriend break-ups, complaints about her group home and needs not being met, self-injurious superficial scratches or scrapes with pens/forks/paperclips/thumbtack/stapler, banging head and calling 911 on herself from her group home setting.     Medical presentations were related to back pain, chest pain, pain from falling, generalized body pain, UTI symptoms, bilateral leg swelling, dizziness.     Pt had multiple 30-day behavioral health readmissions.    Treatment Recommendations & Presentation:  Presentation in the ED: Pt will typically call 911 for EMS/police to bring her to ED's or is accompanied by group home staff. ED visits were related to SI's with no plan or a plan to cut her wrists or throat, jump or walk in front of a car or traffic, hang herself, bang her head or drink bleach. Visits were also related to AH, feelings that no one loves or cares about her, boyfriend break-ups, complaints about her group home and needs not being met, self-injurious superficial scratches or scrapes with pens/forks/paperclips/stapler, banging head and calling 911 on herself from her group home setting.  Medical visits were related to back pain, chest pain, pain from falling, generalized body pain, UTI symptoms, bilateral leg swelling, dizziness.  Pt has superficially scratched or poked herself with a thumbtack on multiple occasions and has also utilized a plastic fork to scratch herself. Pt also states she does  not like living at [facility]   Pt will call 911 on herself in order for EMS to bring her to ED's and tends to do this at certain times when group home staffing is more limited. If pt's boyfriend is being hospitalized then pt also wants hospitalization. Pt may seek hospitalization because she spent her allowance and is frustrated.                 ED Recommendations: Following medical evaluation and treatment, allow pt time for de-escalation and for provider to establish acute risk versus pt's chronic behavioral disturbances.  Contact [facility] to develop a safe discharge plan back to [facility] [phone]. Specific contacts are: Letitia Lucia - Supervisor, Care Coordinator [phone] or Fabio - Care Coordinator [phone] or Supervisor BRITT Betts - [phone]. [phone], or Therapist Dana from [facility] at [phone].         Home Medication Regimen: See most recent documentation in Epic, can verify medication with staff or nurse from [facility] [phone].           Recent Medical Work Ups:  (include Psych testing or ECT)     EKG's - 2023, 2024   Labs - 2023, 2024 Inpatient Recommendations: Collaborate with pt's community sources to develop a comprehensive discharge plan.                 Outpatient recommendations: Pt is to continue with treatment at [facility]. [facility] staff, care coordinators, and therapist should develop a crisis plan with pt and healthy coping skills in response to her distressful feelings.           Situation/Relevant Background Info:  Pt is a 54 year old female with Cognitive Impairment, Bipolar I Disorder, and Borderline Personality Disorder along with an extensive history of behavioral health inpatient admissions.  Pt resides in a group home setting at [facility]. Pt also has Care Coordinators and receives  therapy and psychiatric care through the [facility].   Pt appears to derive secondary gains from ED visits/encounters as well inpatient behavioral health (BH) admissions; therefore, group home staff has advised hospital professionals that pt should have brief behavioral health stays/hospitalizations.   Pt states that she has a boyfriend who is a support system for her; staff confirms that pt has a boyfriend who also lives at [facility] and she will often mirror his issues - pt attempts to be hospitalized when her boyfriend is being hospitalized. Pt's romantic interests may change to other individuals at her group home.  Upon BH admission, Pt often states that she does not want to be in her group home and does not want to be discharged back to her group home. Pt has a possibility of another living situation as per TIP coordinator but pt has to be stable in the community.  Pt was moved from [facility] to [facility] in 2023 and is still getting used to her new staff and housing situation. Therapist Dana states pt's most recent BH admit 11/2023 was due to pt spending her allowance on take out food and then being very upset that money was gone. They intend to work on her budgeting issues.                        Diagnoses/Significant Problems (Medical & Psychiatric):        Psychiatric:  Bipolar 1 Disorder, MDD, PTSD, Borderline Personality Disorder     Medical: Hyponatremia (HCC), Seizures (HCC), Obesity, Hyperlipidemia, HTN, Cognitive Impairment               Drivers of repeated utilization:   Secondary gains from ED visits and inpatient BH stays, impulsivity, limited coping skills, attention seeking behaviors, wanting to be in hospital if her boyfriend is hospitalized, personality disordered behaviors, unhappy with group home.                                                     Existing Community Resources & Supports:                 Little to no family  involvement.   Pt will state she has a boyfriend     Patient Medical & Psychiatric Care Team:  [facility] [place] [phone] or [phone]  [facility] [phone]  Therapist Dana from [facility] - [phone]     [facility] - [phone]   Endocrinology, [place] - [phone]  [facility] - [phone] ext. [phone], supervisor      Care plan date: 07/09/24                   Author:  Brianna Coleman RN                 Date reviewed with patient:
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
## 2024-07-12 NOTE — ED PROVIDER NOTES
History  Chief Complaint   Patient presents with    Psychiatric Evaluation     Pt reports SI with plan. Pt ha been feeling more depressed. Pt was just dc Friday and thinks she left too soon. Has not been compliment / med's but took her afternoon ones.      54-year-old female with a history of schizoaffective disorder presents with depression and suicidal ideation.  Patient states that nobody loves her and she feels sad and has had thoughts about committing suicide.      Psychiatric Evaluation      Prior to Admission Medications   Prescriptions Last Dose Informant Patient Reported? Taking?   Cholecalciferol (VITAMIN D3) 1,000 units tablet   No No   Sig: Take 1 tablet (1,000 Units total) by mouth daily Do not start before September 21, 2024.   Incontinence Supply Disposable (Depend Adjustable Underwear) MISC  Self No No   Sig: Use as needed (as needed) Depends 3XL   acetaminophen (TYLENOL) 325 mg tablet   No No   Sig: Take 2 tablets (650 mg total) by mouth every 4 (four) hours as needed for mild pain   ammonium lactate (LAC-HYDRIN) 12 % lotion   No No   Sig: Apply topically 2 (two) times a day   atorvastatin (LIPITOR) 10 mg tablet   No No   Sig: Take 1 tablet (10 mg total) by mouth daily at bedtime   divalproex sodium (DEPAKOTE ER) 500 mg 24 hr tablet   Yes Yes   Sig: Take 1,250 mg by mouth daily at bedtime   ergocalciferol (VITAMIN D2) 50,000 units   No No   Sig: Take 1 capsule (50,000 Units total) by mouth once a week for 12 doses Do not start before July 6, 2024.   furosemide (LASIX) 20 mg tablet   No No   Sig: Take 1 tablet (20 mg total) by mouth daily   gabapentin (NEURONTIN) 100 mg capsule   No No   Sig: Take 2 capsules (200 mg total) by mouth 3 (three) times a day   lisinopril (ZESTRIL) 20 mg tablet   No No   Sig: Take 1 tablet (20 mg total) by mouth daily   loratadine (CLARITIN) 10 mg tablet  Self No No   Sig: Take 1 tablet (10 mg total) by mouth daily   Patient taking differently: Take 10 mg by mouth daily    meclizine (ANTIVERT) 12.5 MG tablet  Self No No   Sig: Take 1 tablet (12.5 mg total) by mouth every 8 (eight) hours as needed for dizziness   melatonin 3 mg   No No   Sig: Take 1 tablet (3 mg total) by mouth daily at bedtime   nystatin (MYCOSTATIN) powder   No No   Sig: Apply topically 2 (two) times a day   pantoprazole (PROTONIX) 20 mg tablet   No No   Sig: Take 1 tablet (20 mg total) by mouth daily   prazosin (MINIPRESS) 2 mg capsule   No No   Sig: Take 1 capsule (2 mg total) by mouth daily at bedtime   risperiDONE (RisperDAL) 3 mg tablet   No No   Sig: Take 1 tablet (3 mg total) by mouth 2 (two) times a day   senna-docusate sodium (SENOKOT S) 8.6-50 mg per tablet   No No   Sig: Take 1 tablet by mouth daily at bedtime   traZODone (DESYREL) 50 mg tablet   No No   Sig: Take 1 tablet (50 mg total) by mouth daily at bedtime      Facility-Administered Medications: None       Past Medical History:   Diagnosis Date    Anxiety     Bipolar 1 disorder (HCC)     Bipolar affective disorder, depressed, severe (HCC) 10/10/2021    Borderline personality disorder (HCC)     CAD (coronary artery disease)     Cognitive impairment     Depression     Dyslipidemia     GERD (gastroesophageal reflux disease)     Hypertension     Obesity     Psychiatric disorder     Psychiatric illness     PTSD (post-traumatic stress disorder)     Schizoaffective disorder (HCC)     Seizure (HCC)        Past Surgical History:   Procedure Laterality Date    APPENDECTOMY      PATIENT DENIES HAVING APPENDIX REMOVED    CHOLECYSTECTOMY      FOOT SURGERY Right        Family History   Problem Relation Age of Onset    Kidney cancer Mother     Cerebral aneurysm Father      I have reviewed and agree with the history as documented.    E-Cigarette/Vaping    E-Cigarette Use Never User      E-Cigarette/Vaping Substances    Nicotine No     THC No     CBD No     Flavoring No     Other No     Unknown No      Social History     Tobacco Use    Smoking status: Former      "Current packs/day: 0.25     Average packs/day: 0.2 packs/day for 0.4 years (0.1 ttl pk-yrs)     Types: Cigarettes, Cigars     Start date: 2/5/2024    Smokeless tobacco: Never    Tobacco comments:     Pt stated \"smokes when stressed\"   Vaping Use    Vaping status: Never Used   Substance Use Topics    Alcohol use: Not Currently     Comment: occasionally    Drug use: Not Currently       Review of Systems    Physical Exam  Physical Exam  Vitals and nursing note reviewed.   Constitutional:       General: She is not in acute distress.     Appearance: She is well-developed.   HENT:      Head: Normocephalic and atraumatic.      Right Ear: External ear normal.      Left Ear: External ear normal.   Eyes:      General: No scleral icterus.  Pulmonary:      Effort: Pulmonary effort is normal. No respiratory distress.   Abdominal:      General: There is no distension.   Musculoskeletal:         General: Normal range of motion.      Cervical back: Normal range of motion.   Skin:     General: Skin is warm and dry.      Findings: No rash.   Neurological:      Mental Status: She is alert. Mental status is at baseline.   Psychiatric:         Mood and Affect: Mood is depressed.         Thought Content: Thought content includes suicidal ideation. Thought content includes suicidal (cut wrist or neck, sit in traffic) plan.         Vital Signs  ED Triage Vitals   Temperature Pulse Respirations Blood Pressure SpO2   07/12/24 1513 07/12/24 1505 07/12/24 1505 07/12/24 1505 07/12/24 1505   98.2 °F (36.8 °C) 86 19 143/79 96 %      Temp Source Heart Rate Source Patient Position - Orthostatic VS BP Location FiO2 (%)   07/12/24 1513 07/12/24 1505 07/12/24 1505 07/12/24 1505 --   Oral Monitor Sitting Right arm       Pain Score       07/12/24 1505       No Pain           Vitals:    07/12/24 1940 07/12/24 2357 07/13/24 0042 07/13/24 0841   BP: 145/80 150/66 150/66 162/75   Pulse: 88  86 92   Patient Position - Orthostatic VS:    Sitting "         Visual Acuity      ED Medications  Medications   atorvastatin (LIPITOR) tablet 10 mg (has no administration in time range)   furosemide (LASIX) tablet 20 mg (20 mg Oral Given 7/13/24 0834)   gabapentin (NEURONTIN) capsule 200 mg (200 mg Oral Given 7/13/24 0834)   lisinopril (ZESTRIL) tablet 20 mg (20 mg Oral Given 7/13/24 0835)   melatonin tablet 3 mg (3 mg Oral Given 7/12/24 2357)   pantoprazole (PROTONIX) EC tablet 20 mg (20 mg Oral Given 7/13/24 0831)   prazosin (MINIPRESS) capsule 2 mg (2 mg Oral Given 7/12/24 2357)   risperiDONE (RisperDAL) tablet 3 mg (3 mg Oral Given 7/13/24 0836)   traZODone (DESYREL) tablet 50 mg (50 mg Oral Given 7/12/24 2357)   divalproex sodium (DEPAKOTE ER) 24 hr tablet 1,250 mg (1,250 mg Oral Given 7/13/24 0041)   LORazepam (ATIVAN) tablet 1 mg (1 mg Oral Given 7/12/24 1756)       Diagnostic Studies  Results Reviewed       Procedure Component Value Units Date/Time    POCT pregnancy, urine [[id number]]  (Normal) Resulted: 07/12/24 1938    Lab Status: Final result Updated: 07/12/24 1938     EXT Preg Test, Ur Negative     Control Valid    Rapid drug screen, urine [[id number]]  (Normal) Collected: 07/12/24 1521    Lab Status: Final result Specimen: Urine, Clean Catch Updated: 07/12/24 1543     Amph/Meth UR Negative     Barbiturate Ur Negative     Benzodiazepine Urine Negative     Cocaine Urine Negative     Methadone Urine Negative     Opiate Urine Negative     PCP Ur Negative     THC Urine Negative     Oxycodone Urine Negative     Fentanyl Urine Negative     HYDROCODONE URINE Negative    Narrative:      FOR MEDICAL PURPOSES ONLY.   IF CONFIRMATION NEEDED PLEASE CONTACT THE LAB WITHIN 5 DAYS.    Drug Screen Cutoff Levels:  AMPHETAMINE/METHAMPHETAMINES  1000 ng/mL  BARBITURATES     200 ng/mL  BENZODIAZEPINES     200 ng/mL  COCAINE      300 ng/mL  METHADONE      300 ng/mL  OPIATES      300 ng/mL  PHENCYCLIDINE     25 ng/mL  THC       50 ng/mL  OXYCODONE      100 ng/mL  FENTANYL      5  ng/mL  HYDROCODONE     300 ng/mL    POCT alcohol breath test [[id number]]  (Normal) Resulted: 07/12/24 1514    Lab Status: Final result Updated: 07/12/24 1514     EXTBreath Alcohol 0.00                   No orders to display              Procedures  Procedures         ED Course  ED Course as of 07/13/24 1511   Fri Jul 12, 2024   1528 Discussed case with Rojelio regarding the patient's recurrent suicidal ideation with plan of cutting self cutting neck or walking into traffic which is well-documented in the patient's high utilizer plan.   1620 Patient signed out to Dr. Steve with Crisis eval pending.                                 SBIRT 22yo+      Flowsheet Row Most Recent Value   Initial Alcohol Screen: US AUDIT-C     1. How often do you have a drink containing alcohol? 0 Filed at: 07/12/2024 1530   2. How many drinks containing alcohol do you have on a typical day you are drinking?  0 Filed at: 07/12/2024 1530   3a. Male UNDER 65: How often do you have five or more drinks on one occasion? 0 Filed at: 07/12/2024 1530   3b. FEMALE Any Age, or MALE 65+: How often do you have 4 or more drinks on one occassion? 0 Filed at: 07/12/2024 1530   Audit-C Score 0 Filed at: 07/12/2024 1530   DAT: How many times in the past year have you...    Used an illegal drug or used a prescription medication for non-medical reasons? Never Filed at: 07/12/2024 1530                      Medical Decision Making  Plan is for medical clearance and crisis evaluation    Amount and/or Complexity of Data Reviewed  External Data Reviewed: notes.     Details: High Utilizer plan reviewed  Labs: ordered.    Risk  OTC drugs.  Prescription drug management.  Decision regarding hospitalization.                 Disposition  Final diagnoses:   Depression with suicidal ideation     Time reflects when diagnosis was documented in both MDM as applicable and the Disposition within this note       Time User Action Codes Description Comment    7/12/2024  3:28 PM  Freddy Davila Add [F32.A,  R45.851] Depression with suicidal ideation           ED Disposition       ED Disposition   Transfer to Behavioral Health Condition   --    Date/Time   Fri Jul 12, 2024 1528    Comment   Leydi Khan should be transferred out to inpatient  and has been medically cleared.               Follow-up Information    None         Patient's Medications   Discharge Prescriptions    No medications on file       No discharge procedures on file.    PDMP Review         Value Time User    PDMP Reviewed  Yes 7/4/2024  9:55 AM SHANI Hawk            ED Provider  Electronically Signed by             Freddy Davila DO  07/13/24 1519

## 2024-07-13 VITALS
HEART RATE: 81 BPM | DIASTOLIC BLOOD PRESSURE: 63 MMHG | SYSTOLIC BLOOD PRESSURE: 142 MMHG | TEMPERATURE: 98.2 F | RESPIRATION RATE: 20 BRPM | OXYGEN SATURATION: 96 %

## 2024-07-13 RX ADMIN — DIVALPROEX SODIUM 1250 MG: 500 TABLET, EXTENDED RELEASE ORAL at 00:41

## 2024-07-13 RX ADMIN — FUROSEMIDE 20 MG: 20 TABLET ORAL at 08:34

## 2024-07-13 RX ADMIN — RISPERIDONE 3 MG: 2 TABLET, FILM COATED ORAL at 08:36

## 2024-07-13 RX ADMIN — ATORVASTATIN CALCIUM 10 MG: 10 TABLET, FILM COATED ORAL at 17:00

## 2024-07-13 RX ADMIN — RISPERIDONE 3 MG: 2 TABLET, FILM COATED ORAL at 00:40

## 2024-07-13 RX ADMIN — LISINOPRIL 20 MG: 20 TABLET ORAL at 08:35

## 2024-07-13 RX ADMIN — PANTOPRAZOLE SODIUM 20 MG: 20 TABLET, DELAYED RELEASE ORAL at 08:31

## 2024-07-13 RX ADMIN — GABAPENTIN 200 MG: 100 CAPSULE ORAL at 17:00

## 2024-07-13 RX ADMIN — GABAPENTIN 200 MG: 100 CAPSULE ORAL at 08:34

## 2024-07-13 NOTE — ED CARE HANDOFF
Emergency Department Sign Out Note        Sign out and transfer of care from Dr. Steve. See Separate Emergency Department note.     The patient, Leydi Khan, was evaluated by the previous provider for SI.    Workup Completed:  Medical clearance.  Psychiatry consult.  201 signed.    ED Course / Workup Pending (followup):  Pending placement.                                  ED Course as of 07/13/24 0353   Sat Jul 13, 2024   0020 SO - 53 yo F. SI. Does not like group home. 201 signed.     Procedures  Medical Decision Making    54 y.o. female presenting for SI.  Continual observation in place.    Patient is medically stable for inpatient psychiatric care.  Patient agreeable to 201. 201 form signed and placed in patient chart.  Patient care will be transferred pending placement.    Amount and/or Complexity of Data Reviewed  Labs: ordered.    Risk  OTC drugs.  Prescription drug management.  Decision regarding hospitalization.            Disposition  Final diagnoses:   Depression with suicidal ideation     Time reflects when diagnosis was documented in both MDM as applicable and the Disposition within this note       Time User Action Codes Description Comment    7/12/2024  3:28 PM Freddy Davila Add [F32.A,  R45.851] Depression with suicidal ideation           ED Disposition       ED Disposition   Transfer to Behavioral Health Condition   --    Date/Time   Fri Jul 12, 2024  3:28 PM    Comment   Leydi Khan should be transferred out to inpatient  and has been medically cleared.               Follow-up Information    None       Patient's Medications   Discharge Prescriptions    No medications on file     No discharge procedures on file.       ED Provider  Electronically Signed by     Gerald Joseph DO  07/13/24 0353

## 2024-07-13 NOTE — ED NOTES
Spoke with [facility] they will accept Patient. Patient is out of medicare days. They are asking for a Precert from [facility].     Crisis worker Called [facility] and left a message     Jayleen Kruse MS

## 2024-07-13 NOTE — ED NOTES
Bed Search    [facility] (jason) [facility]als faxed  Sapna Garcia (joellne) clinicals faxed  Okay - no answer  [facility] (kia) clinicals faxed  [facility] (arc) clinicals faxed  Karlee QUINTANILLA (kathleen) no beds

## 2024-07-13 NOTE — ED CARE HANDOFF
Emergency Department Sign Out Note        Sign out and transfer of care from Dr. Davila. See Separate Emergency Department note.     The patient, Leydi Khan, was evaluated by the previous provider for SI, depression.    Workup Completed:  Medically cleared.  Medical history reviewed including the fact that she was discharged from inpatient behavioral health unit recently.    ED Course / Workup Pending (followup):  Psychiatric consultation was performed.  Psychiatrist believes patient should sign 201.  Crisis discussed this with patient, who did sign a 201.  Bed search underway.                                     Procedures  Medical Decision Making  Amount and/or Complexity of Data Reviewed  Labs: ordered.    Risk  Prescription drug management.  Decision regarding hospitalization.            Disposition  Final diagnoses:   Depression with suicidal ideation     Time reflects when diagnosis was documented in both MDM as applicable and the Disposition within this note       Time User Action Codes Description Comment    7/12/2024  3:28 PM Freddy Davila Add [F32.A,  R45.851] Depression with suicidal ideation           ED Disposition       ED Disposition   Transfer to Behavioral Health Condition   --    Date/Time   Fri Jul 12, 2024  3:28 PM    Comment   Leydi Khan should be transferred out to inpatient  and has been medically cleared.               Follow-up Information    None       Patient's Medications   Discharge Prescriptions    No medications on file     No discharge procedures on file.       ED Provider  Electronically Signed by     Rodrigo Steve DO  07/12/24 1903

## 2024-07-13 NOTE — ED NOTES
Crisis Worker called [facility] at [phone] and left a second message       Kell Kruse MS

## 2024-07-13 NOTE — ED NOTES
..Patient is accepted at [facility]   Patient is accepted by Dr. Jackson Leon  per intake     Transportation is arranged with [facility].     Transportation is scheduled for TBD  Patient may go to the floor at  anytime         No Nurse report needed    Kell Kruse, MS

## 2024-07-13 NOTE — ED NOTES
Bed Search    [facility]- no answer    [facility] - reviewing   [facility]- Reviewing       Kell Kruse MS

## 2024-07-13 NOTE — ED NOTES
Julius -spoke with Akosua, declined due to acuity   Sapna Garcia-DENIED  Edward - no answer  Pato-spoke with Jimmie, declined due to acuity   Friends-spoke with Jelena, declined due to acuity   Karlee QUINTANILLA-spoke with Angela ,no beds

## 2024-07-14 NOTE — EMTALA/ACUTE CARE TRANSFER
[facility] EMERGENCY DEPARTMENT  [address] [facility]  [place] [address]  Dept: [phone]      EMTALA TRANSFER CONSENT    NAME Leydi Khan                                         1970                              MRN [id number]    I have been informed of my rights regarding examination, treatment, and transfer   by Dr. Freddy Davila,     Benefits: Continuity of care    Risks:        Consent for Transfer:  I acknowledge that my medical condition has been evaluated and explained to me by the emergency department physician or other qualified medical person and/or my attending physician, who has recommended that I be transferred to the service of  Accepting Physician: Dr Jackson Leon at Accepting Facility Name, City & State : [address] [place] [address]. The above potential benefits of such transfer, the potential risks associated with such transfer, and the probable risks of not being transferred have been explained to me, and I fully understand them.  The doctor has explained that, in my case, the benefits of transfer outweigh the risks.  I agree to be transferred.    I authorize the performance of emergency medical procedures and treatments upon me in both transit and upon arrival at the receiving facility.  Additionally, I authorize the release of any and all medical records to the receiving facility and request they be transported with me, if possible.  I understand that the safest mode of transportation during a medical emergency is an ambulance and that the Hospital advocates the use of this mode of transport. Risks of traveling to the receiving facility by car, including absence of medical control, life sustaining equipment, such as oxygen, and medical personnel has been explained to me and I fully understand them.    (PRAKASH CORRECT BOX BELOW)  [  ]  I consent to the stated transfer and to be transported by ambulance/helicopter.  [  ]  I  consent to the stated transfer, but refuse transportation by ambulance and accept full responsibility for my transportation by car.  I understand the risks of non-ambulance transfers and I exonerate the Hospital and its staff from any deterioration in my condition that results from this refusal.    X___________________________________________    DATE  24  TIME________  Signature of patient or legally responsible individual signing on patient behalf           RELATIONSHIP TO PATIENT_________________________          Provider Certification    NAME Leydi Khan                                         1970                              MRN [id number]    A medical screening exam was performed on the above named patient.  Based on the examination:    Condition Necessitating Transfer The primary encounter diagnosis was Depression with suicidal ideation. A diagnosis of Suicidal ideation was also pertinent to this visit.    Patient Condition: The patient has been stabilized such that within reasonable medical probability, no material deterioration of the patient condition or the condition of the unborn child(courtney) is likely to result from the transfer    Reason for Transfer: Level of Care needed not available at this facility    Transfer Requirements: Facility [facility] [address] [place] [address]   Space available and qualified personnel available for treatment as acknowledged by    Agreed to accept transfer and to provide appropriate medical treatment as acknowledged by       Dr Jackson Leon  Appropriate medical records of the examination and treatment of the patient are provided at the time of transfer   STAFF INITIAL WHEN COMPLETED _______  Transfer will be performed by qualified personnel from    and appropriate transfer equipment as required, including the use of necessary and appropriate life support measures.    Provider Certification: I have examined the patient and explained the following  risks and benefits of being transferred/refusing transfer to the patient/family:  The patient is stable for psychiatric transfer because they are medically stable, and is protected from harming him/herself or others during transport      Based on these reasonable risks and benefits to the patient and/or the unborn child(courtney), and based upon the information available at the time of the patient’s examination, I certify that the medical benefits reasonably to be expected from the provision of appropriate medical treatments at another medical facility outweigh the increasing risks, if any, to the individual’s medical condition, and in the case of labor to the unborn child, from effecting the transfer.    X____________________________________________ DATE 07/13/24        TIME_______      ORIGINAL - SEND TO MEDICAL RECORDS   COPY - SEND WITH PATIENT DURING TRANSFER

## 2024-07-14 NOTE — ED NOTES
...................Insurance Authorization for admission:   Phone call placed to *[facility]   Phone number: [phone]  Spoke to Tishone A  3 days approved.  Level of care: inpatient   Review on day of Arrival   Authorization #  will be given on day of arrival         Kell Kruse MS

## 2024-07-31 ENCOUNTER — TELEPHONE (OUTPATIENT)
Age: 54
End: 2024-07-31

## 2024-07-31 DIAGNOSIS — G89.29 CHRONIC MIDLINE LOW BACK PAIN WITHOUT SCIATICA: ICD-10-CM

## 2024-07-31 DIAGNOSIS — M54.50 CHRONIC MIDLINE LOW BACK PAIN WITHOUT SCIATICA: ICD-10-CM

## 2024-08-01 RX ORDER — ACETAMINOPHEN 325 MG/1
650 TABLET ORAL EVERY 4 HOURS PRN
Qty: 90 TABLET | Refills: 0 | Status: SHIPPED | OUTPATIENT
Start: 2024-08-01

## 2024-08-02 ENCOUNTER — HOSPITAL ENCOUNTER (EMERGENCY)
Facility: HOSPITAL | Age: 54
Discharge: HOME | End: 2024-08-02
Attending: EMERGENCY MEDICINE
Payer: MEDICARE

## 2024-08-02 VITALS
HEART RATE: 101 BPM | SYSTOLIC BLOOD PRESSURE: 122 MMHG | DIASTOLIC BLOOD PRESSURE: 62 MMHG | TEMPERATURE: 98.3 F | RESPIRATION RATE: 18 BRPM | OXYGEN SATURATION: 97 %

## 2024-08-02 DIAGNOSIS — F32.A DEPRESSION: Primary | ICD-10-CM

## 2024-08-02 PROCEDURE — 99284 EMERGENCY DEPT VISIT MOD MDM: CPT | Performed by: EMERGENCY MEDICINE

## 2024-08-02 PROCEDURE — 99284 EMERGENCY DEPT VISIT MOD MDM: CPT

## 2024-08-02 RX ORDER — LORAZEPAM 0.5 MG/1
0.5 TABLET ORAL ONCE
Status: DISCONTINUED | OUTPATIENT
Start: 2024-08-02 | End: 2024-08-03 | Stop reason: HOSPADM

## 2024-08-02 NOTE — ED PROVIDER NOTES
History  Chief Complaint   Patient presents with    Mental Health Problem     Pt to er via ems with reports that she is having increased depression     54 year old female presents for evaluation of increased depression since last night associated with suicidal ideation.  Patient states she is feeling lonely and like no one cares about her.  She reports that she tried to kill herself by scratching her forearm with the tines of a plastic fork.  Patient states she is taking her medications as prescribed, but does not feel they are helping her.          Prior to Admission Medications   Prescriptions Last Dose Informant Patient Reported? Taking?   Cholecalciferol (VITAMIN D3) 1,000 units tablet   No No   Sig: Take 1 tablet (1,000 Units total) by mouth daily Do not start before September 21, 2024.   Incontinence Supply Disposable (Depend Adjustable Underwear) MISC  Self No No   Sig: Use as needed (as needed) Depends 3XL   acetaminophen (TYLENOL) 325 mg tablet   No No   Sig: Take 2 tablets (650 mg total) by mouth every 4 (four) hours as needed for mild pain   ammonium lactate (LAC-HYDRIN) 12 % lotion   No No   Sig: Apply topically 2 (two) times a day   atorvastatin (LIPITOR) 10 mg tablet   No No   Sig: Take 1 tablet (10 mg total) by mouth daily at bedtime   divalproex sodium (DEPAKOTE ER) 500 mg 24 hr tablet   Yes No   Sig: Take 1,250 mg by mouth daily at bedtime   ergocalciferol (VITAMIN D2) 50,000 units   No No   Sig: Take 1 capsule (50,000 Units total) by mouth once a week for 12 doses Do not start before July 6, 2024.   furosemide (LASIX) 20 mg tablet   No No   Sig: Take 1 tablet (20 mg total) by mouth daily   gabapentin (NEURONTIN) 100 mg capsule   No No   Sig: Take 2 capsules (200 mg total) by mouth 3 (three) times a day   lisinopril (ZESTRIL) 20 mg tablet   No No   Sig: Take 1 tablet (20 mg total) by mouth daily   loratadine (CLARITIN) 10 mg tablet  Self No No   Sig: Take 1 tablet (10 mg total) by mouth daily    Patient taking differently: Take 10 mg by mouth daily   meclizine (ANTIVERT) 12.5 MG tablet  Self No No   Sig: Take 1 tablet (12.5 mg total) by mouth every 8 (eight) hours as needed for dizziness   melatonin 3 mg   No No   Sig: Take 1 tablet (3 mg total) by mouth daily at bedtime   nystatin (MYCOSTATIN) powder   No No   Sig: Apply topically 2 (two) times a day   pantoprazole (PROTONIX) 20 mg tablet   No No   Sig: Take 1 tablet (20 mg total) by mouth daily   prazosin (MINIPRESS) 2 mg capsule   No No   Sig: Take 1 capsule (2 mg total) by mouth daily at bedtime   risperiDONE (RisperDAL) 3 mg tablet   No No   Sig: Take 1 tablet (3 mg total) by mouth 2 (two) times a day   senna-docusate sodium (SENOKOT S) 8.6-50 mg per tablet   No No   Sig: Take 1 tablet by mouth daily at bedtime   traZODone (DESYREL) 50 mg tablet   No No   Sig: Take 1 tablet (50 mg total) by mouth daily at bedtime      Facility-Administered Medications: None       Past Medical History:   Diagnosis Date    Anxiety     Bipolar 1 disorder (HCC)     Bipolar affective disorder, depressed, severe (HCC) 10/10/2021    Borderline personality disorder (HCC)     CAD (coronary artery disease)     Cognitive impairment     Depression     Dyslipidemia     GERD (gastroesophageal reflux disease)     Hypertension     Obesity     Psychiatric disorder     Psychiatric illness     PTSD (post-traumatic stress disorder)     Schizoaffective disorder (HCC)     Seizure (HCC)        Past Surgical History:   Procedure Laterality Date    APPENDECTOMY      PATIENT DENIES HAVING APPENDIX REMOVED    CHOLECYSTECTOMY      FOOT SURGERY Right        Family History   Problem Relation Age of Onset    Kidney cancer Mother     Cerebral aneurysm Father      I have reviewed and agree with the history as documented.    E-Cigarette/Vaping    E-Cigarette Use Never User      E-Cigarette/Vaping Substances    Nicotine No     THC No     CBD No     Flavoring No     Other No     Unknown No      Social  "History     Tobacco Use    Smoking status: Former     Current packs/day: 0.25     Average packs/day: 0.3 packs/day for 0.5 years (0.1 ttl pk-yrs)     Types: Cigarettes, Cigars     Start date: 2/5/2024    Smokeless tobacco: Never    Tobacco comments:     Pt stated \"smokes when stressed\"   Vaping Use    Vaping status: Never Used   Substance Use Topics    Alcohol use: Not Currently     Comment: occasionally    Drug use: Not Currently       Review of Systems    Physical Exam  Physical Exam  Vitals and nursing note reviewed.   HENT:      Head: Normocephalic and atraumatic.   Cardiovascular:      Rate and Rhythm: Normal rate and regular rhythm.      Pulses: Normal pulses.   Pulmonary:      Effort: Pulmonary effort is normal. No respiratory distress.   Abdominal:      General: There is no distension.      Palpations: Abdomen is soft.   Skin:     General: Skin is warm and dry.      Comments: Very superficial linear scratches of the left forearm none of which are deep enough to draw blood.   Neurological:      Mental Status: She is alert.         Vital Signs  ED Triage Vitals [08/02/24 1823]   Temperature Pulse Respirations Blood Pressure SpO2   98.3 °F (36.8 °C) 101 18 122/62 97 %      Temp Source Heart Rate Source Patient Position - Orthostatic VS BP Location FiO2 (%)   Oral Monitor Lying Right arm --      Pain Score       --           Vitals:    08/02/24 1823   BP: 122/62   Pulse: 101   Patient Position - Orthostatic VS: Lying         Visual Acuity      ED Medications  Medications   LORazepam (ATIVAN) tablet 0.5 mg (has no administration in time range)       Diagnostic Studies  Results Reviewed       Procedure Component Value Units Date/Time    Rapid drug screen, urine [[id number]]     Lab Status: No result Specimen: Urine     POCT alcohol breath test [[id number]]     Lab Status: No result                    No orders to display              Procedures  Procedures         ED Course                                 SBIRT " 20yo+      Flowsheet Row Most Recent Value   Initial Alcohol Screen: US AUDIT-C     1. How often do you have a drink containing alcohol? 0 Filed at: 08/02/2024 1822   2. How many drinks containing alcohol do you have on a typical day you are drinking?  0 Filed at: 08/02/2024 1822   3a. Male UNDER 65: How often do you have five or more drinks on one occasion? 0 Filed at: 08/02/2024 1822   3b. FEMALE Any Age, or MALE 65+: How often do you have 4 or more drinks on one occassion? 0 Filed at: 08/02/2024 1822   Audit-C Score 0 Filed at: 08/02/2024 1822   DAT: How many times in the past year have you...    Used an illegal drug or used a prescription medication for non-medical reasons? Never Filed at: 08/02/2024 1822                      Medical Decision Making  54 year old female well known to this facility presents for evaluation of increased depression.  Self harming behavior with very superficial scratches to the forearm from a plastic fork.  Crisis consulted.  No criteria for inpatient psychiatric admission at this time as patient has adequate outpatient resources.    Amount and/or Complexity of Data Reviewed  Labs: ordered.    Risk  Prescription drug management.                 Disposition  Final diagnoses:   Depression     Time reflects when diagnosis was documented in both MDM as applicable and the Disposition within this note       Time User Action Codes Description Comment    8/2/2024 10:07 PM Ana Maria Pappas Add [F32.A] Depression           ED Disposition       ED Disposition   Discharge    Condition   Stable    Date/Time   Fri Aug 2, 2024 2207    Comment   Leydi Khan discharge to home/self care.                   Follow-up Information       Follow up With Specialties Details Why Contact Info Additional Information    [facility], Nurse Practitioner  As needed [facility]  [address] [place] [address]  [phone]        [facility] Emergency Department  Emergency Medicine Go to  If symptoms worsen [address] [facility] [address] [phone] [facility] Emergency Department, [address] [place], [place] [address]            Patient's Medications   Discharge Prescriptions    No medications on file       No discharge procedures on file.    PDMP Review         Value Time User    PDMP Reviewed  Yes 7/4/2024  9:55 AM SHANI Hawk            ED Provider  Electronically Signed by             Ana Maria Pappas MD  08/02/24 9799

## 2024-08-03 NOTE — DISCHARGE INSTRUCTIONS
This writer discussed the patients current presentation and recommended discharge plan with .  They agree with the patient being discharged at this time with referrals and/or information about Depression     The patient was Instructed to follow up with their Providers at [facility]      In addition, the patient was instructed to call local [facility] crisis, other crisis services, 911 or to go to the nearest ER immediately if their situation changes at any time.       This writer discussed discharge plans with the patient, who agrees with and understands the discharge plans.         SAFETY PLAN  Warning Signs (thoughts, images, mood, behavior, situations) of a potential crisis: wanting to go to the hospital       Coping Skills (what can I do to take my mind off the problem, or to keep myself safe): talk to staff, listen to music       Outside Support (who can I reach out to for support and help): staff        [place] Suicide Prevention Hotline:  988      [facility] [phone] - [facility] [phone] - LVF Crisis/Mobile Crisis   [phone] - SLPF Crisis   [facility]: [phone]  [facility]: [phone]   [facility] [phone] - Crisis   [facility] [phone] - Crisis     [facility] [phone] - Crisis   [facility] [phone] - Crisis   [phone] - Peer Support Talk Line (1-9pm daily)  [phone] - Teen Support Talk Line (1-9pm daily)  [phone] - [facility] [phone]- Crisis    [facility] [phone] - Crisis   [facility] [phone] - Crisis    [facility]) [phone] - [facility] Crisis

## 2024-08-03 NOTE — ED NOTES
Pt is a 54 y.o. female who was brought to the ED with   Chief Complaint   Patient presents with    Mental Health Problem     Pt to er via ems with reports that she is having increased depression   CIS received a call from Ivan ([facility] staff) who informed me that patient called EMS to take her to the hospital, and there was no stressor today that made patient feel this way, Ivan reports that staff spoke with patient for several hours trying to distract patient.this is patient baseline behavior. Patient was addiment about coming to the ED. Per Ivan patient was just discharged from [facility] on Wednesday. CIS met with patient and discussed what happened patient reports that she does have some coping skills, and now wish to return to [facility]. CIS discussed this case with ED Physician. Patient will be discharged per ED Physician  CIS  contacted Ivan ([facility] staff) and informed him that patient will be discharged. Ivan informed CIS that he will send a staff member to go and pick her up.      Oliver Lovelace  Crisis Intervention Specialist II

## 2024-08-03 NOTE — ED NOTES
RN placed socks on pt and pt her in a wheelchair. Pt is waiting in waiting room for her ride that will be coming shortly      Mariia Esteves RN  08/02/24 7150

## 2024-08-06 ENCOUNTER — HOSPITAL ENCOUNTER (EMERGENCY)
Facility: HOSPITAL | Age: 54
End: 2024-08-07
Attending: EMERGENCY MEDICINE
Payer: MEDICARE

## 2024-08-06 DIAGNOSIS — G89.29 CHRONIC MIDLINE LOW BACK PAIN WITHOUT SCIATICA: ICD-10-CM

## 2024-08-06 DIAGNOSIS — M54.50 CHRONIC MIDLINE LOW BACK PAIN WITHOUT SCIATICA: ICD-10-CM

## 2024-08-06 DIAGNOSIS — I10 PRIMARY HYPERTENSION: ICD-10-CM

## 2024-08-06 DIAGNOSIS — F32.A DEPRESSION WITH SUICIDAL IDEATION: Primary | ICD-10-CM

## 2024-08-06 DIAGNOSIS — R45.851 DEPRESSION WITH SUICIDAL IDEATION: Primary | ICD-10-CM

## 2024-08-06 DIAGNOSIS — F25.1 SCHIZOAFFECTIVE DISORDER, DEPRESSIVE TYPE (HCC): ICD-10-CM

## 2024-08-06 DIAGNOSIS — E78.2 MIXED HYPERLIPIDEMIA: ICD-10-CM

## 2024-08-06 PROCEDURE — 80307 DRUG TEST PRSMV CHEM ANLYZR: CPT | Performed by: EMERGENCY MEDICINE

## 2024-08-06 PROCEDURE — 82075 ASSAY OF BREATH ETHANOL: CPT | Performed by: EMERGENCY MEDICINE

## 2024-08-06 PROCEDURE — 81025 URINE PREGNANCY TEST: CPT | Performed by: EMERGENCY MEDICINE

## 2024-08-06 PROCEDURE — 99285 EMERGENCY DEPT VISIT HI MDM: CPT | Performed by: EMERGENCY MEDICINE

## 2024-08-06 PROCEDURE — 99284 EMERGENCY DEPT VISIT MOD MDM: CPT

## 2024-08-06 RX ORDER — ATORVASTATIN CALCIUM 10 MG/1
10 TABLET, FILM COATED ORAL
Status: DISCONTINUED | OUTPATIENT
Start: 2024-08-07 | End: 2024-08-07 | Stop reason: HOSPADM

## 2024-08-06 RX ORDER — RISPERIDONE 0.5 MG/1
3 TABLET, ORALLY DISINTEGRATING ORAL ONCE
Status: COMPLETED | OUTPATIENT
Start: 2024-08-06 | End: 2024-08-06

## 2024-08-06 RX ORDER — TRAZODONE HYDROCHLORIDE 50 MG/1
50 TABLET, FILM COATED ORAL
Status: DISCONTINUED | OUTPATIENT
Start: 2024-08-06 | End: 2024-08-07 | Stop reason: HOSPADM

## 2024-08-06 RX ORDER — GABAPENTIN 100 MG/1
200 CAPSULE ORAL 3 TIMES DAILY
Status: DISCONTINUED | OUTPATIENT
Start: 2024-08-06 | End: 2024-08-07 | Stop reason: HOSPADM

## 2024-08-06 RX ORDER — FUROSEMIDE 20 MG
20 TABLET ORAL DAILY
Status: DISCONTINUED | OUTPATIENT
Start: 2024-08-07 | End: 2024-08-07 | Stop reason: HOSPADM

## 2024-08-06 RX ORDER — LISINOPRIL 20 MG/1
20 TABLET ORAL DAILY
Status: DISCONTINUED | OUTPATIENT
Start: 2024-08-07 | End: 2024-08-07 | Stop reason: HOSPADM

## 2024-08-06 RX ADMIN — GABAPENTIN 200 MG: 100 CAPSULE ORAL at 22:00

## 2024-08-06 RX ADMIN — TRAZODONE HYDROCHLORIDE 50 MG: 50 TABLET ORAL at 22:21

## 2024-08-06 RX ADMIN — RISPERIDONE 3 MG: 0.5 TABLET, ORALLY DISINTEGRATING ORAL at 20:06

## 2024-08-06 RX ADMIN — DIVALPROEX SODIUM 1250 MG: 250 TABLET, EXTENDED RELEASE ORAL at 22:21

## 2024-08-06 NOTE — ED NOTES
Received a phone call from Melanie from [facility] in reference of the patient.  Melanie stated Leydi was having a good day and does not know why she called EMS to bring her to the ED.  [facility] is will  patient when she is ready to return.

## 2024-08-06 NOTE — ED NOTES
54 y.o female patient presented tot he ED via EMS. Per Ems she stated she wanted to talk to someone about her increased depression.  Pt currently resides at [facility].  Pt stated she does not like living there anymore and she wants to go inpatient.  Pt does have a Behavior Health [employer] Plan due to her excessive visits to the ED. Pt presented to the ED on 7/26/24 for the same reason. Pt stated she is depressed and has low self esteem and no one likes her.  Pt stated she will hurt herself if she has to go back to the group home by banging her head against the wall or attempting to choke herself while in the ED. Pt reported she used her coping skills but they did not work. Pt denies  HI and A/V hallucinations.  Pt has had multiple inpatient hospitalizations. Pt currently has outpatient Psychiatry and Therapy with [facility].  Pt denies any legal and substance abuse issues. [facility] Staff reported having no problems and concerns with the pt today and don't know why she called EMS.  [facility] will  pt if she is going to be discharged back to the [facility]. Pt has staff support available 24 hours a day for support and guidance.

## 2024-08-06 NOTE — ED NOTES
Summary: Behavioral Health High Utilizer Careplan    Patient Name:Leydi Khan MRN:  [id number]         : 1970     Age: 54 y.o.    Sex: female   Utilization History:  (# of ED visits & IP admits; reasons)  Pt had 14 [facility]-ED visits from 2024-2024. ED presentations were related to SI's with no plan or a plan to cut her wrists or throat, jump or walk in front of a car or traffic, hang herself, bang her head or drink bleach.  Presentations were also related to AH, feelings that no one loves or cares about her, boyfriend break-ups, complaints about her group home and needs not being met, self-injurious superficial scratches or scrapes with pens/forks/paperclips/thumbtack/stapler, banging head and calling 911 on herself from her group home setting.     Medical presentations were related to back pain, chest pain, pain from falling, generalized body pain, UTI symptoms, bilateral leg swelling, dizziness.     Pt had multiple 30-day behavioral health readmissions.    Treatment Recommendations & Presentation:  Presentation in the ED: Pt will typically call 911 for EMS/police to bring her to ED's or is accompanied by group home staff. ED visits were related to SI's with no plan or a plan to cut her wrists or throat, jump or walk in front of a car or traffic, hang herself, bang her head or drink bleach. Visits were also related to AH, feelings that no one loves or cares about her, boyfriend break-ups, complaints about her group home and needs not being met, self-injurious superficial scratches or scrapes with pens/forks/paperclips/stapler, banging head and calling 911 on herself from her group home setting.  Medical visits were related to back pain, chest pain, pain from falling, generalized body pain, UTI symptoms, bilateral leg swelling, dizziness.  Pt has superficially scratched or poked herself with a thumbtack on multiple occasions and has also utilized a plastic fork to scratch herself. Pt also states she does  not like living at her group [facility] and [facility]   Pt will call 911 on herself in order for EMS to bring her to ED's and tends to do this at certain times when group home staffing is more limited. If pt's boyfriend is being hospitalized then pt also wants hospitalization. Pt may seek hospitalization because she spent her allowance and is frustrated.                 ED Recommendations: Following medical evaluation and treatment, allow pt time for de-escalation and for provider to establish acute risk versus pt's chronic behavioral disturbances.  Contact [facility] to develop a safe discharge plan back to [facility] [phone]. Specific contacts are: Letitia Lucia - Supervisor, Care Coordinator [phone]. [phone] or Fabio - Care Coordinator [phone] or Supervisor BRITT Betts - [phone]. [phone], or Therapist Dana from [facility] at [phone] ext [phone].         Home Medication Regimen: See most recent documentation in Epic, can verify medication with staff or nurse from [facility] [phone].           Recent Medical Work Ups:  (include Psych testing or ECT)     EKG's - 2023, 2024   Labs - 2023, 2024 Inpatient Recommendations: Collaborate with pt's community sources to develop a comprehensive discharge plan.                 Outpatient recommendations: Pt is to continue with treatment at [facility]. [facility] staff, care coordinators, and therapist should develop a crisis plan with pt and healthy coping skills in response to her distressful feelings.           Situation/Relevant Background Info:  Pt is a 54 year old female with Cognitive Impairment, Bipolar I Disorder, and Borderline Personality Disorder along with an extensive history of behavioral health inpatient admissions.  Pt resides in a group home setting at [facility]. Pt also has Care Coordinators and receives  therapy and psychiatric care through the [facility].   Pt appears to derive secondary gains from ED visits/encounters as well inpatient behavioral health (BH) admissions; therefore, group home staff has advised hospital professionals that pt should have brief behavioral health stays/hospitalizations.   Pt states that she has a boyfriend who is a support system for her; staff confirms that pt has a boyfriend who also lives at [facility] and she will often mirror his issues - pt attempts to be hospitalized when her boyfriend is being hospitalized. Pt's romantic interests may change to other individuals at her group home.  Upon BH admission, Pt often states that she does not want to be in her group home and does not want to be discharged back to her group home. Pt has a possibility of another living situation as per TIP coordinator but pt has to be stable in the community.  Pt was moved from [facility] to [facility] in 2023 and is still getting used to her new staff and housing situation. Therapist Dana states pt's most recent BH admit 11/2023 was due to pt spending her allowance on take out food and then being very upset that money was gone. They intend to work on her budgeting issues.                        Diagnoses/Significant Problems (Medical & Psychiatric):        Psychiatric:  Bipolar 1 Disorder, MDD, PTSD, Borderline Personality Disorder     Medical: Hyponatremia (HCC), Seizures (HCC), Obesity, Hyperlipidemia, HTN, Cognitive Impairment               Drivers of repeated utilization:   Secondary gains from ED visits and inpatient BH stays, impulsivity, limited coping skills, attention seeking behaviors, wanting to be in hospital if her boyfriend is hospitalized, personality disordered behaviors, unhappy with group home.                                                     Existing Community Resources & Supports:                 Little to no family  involvement.   Pt will state she has a boyfriend     Patient Medical & Psychiatric Care Team:  [facility] [place] [phone] or [phone]  [facility] [phone]  Therapist Dana from [facility] - [phone]     [facility] - [phone]   Endocrinology, [place] - [phone]  [facility] - [phone]. [phone], supervisor      Care plan date: 07/09/24                   Author:  Brianna Coleman RN                 Date reviewed with patient:               Electronically signed by Brianna Coleman RN at 7/9/2024  1:32 PM         Documentation on 7/9/2024          Note shared with patient

## 2024-08-06 NOTE — ED PROVIDER NOTES
"History  Chief Complaint   Patient presents with    Mental Health Problem     Pt to er via ems with reports of increased depression and requesting \"someone to talk to\"      54-year-old female presents by ambulance for the evaluation of recurrent depression with suicidal ideation that has been ongoing for the past 5 days.  Patient cannot identify any specific trigger upon my questioning as to her recurrent thoughts of SI with vague plan to smother herself.        Prior to Admission Medications   Prescriptions Last Dose Informant Patient Reported? Taking?   Cholecalciferol (VITAMIN D3) 1,000 units tablet   No No   Sig: Take 1 tablet (1,000 Units total) by mouth daily Do not start before September 21, 2024.   Incontinence Supply Disposable (Depend Adjustable Underwear) MISC  Self No No   Sig: Use as needed (as needed) Depends 3XL   acetaminophen (TYLENOL) 325 mg tablet   No No   Sig: Take 2 tablets (650 mg total) by mouth every 4 (four) hours as needed for mild pain   ammonium lactate (LAC-HYDRIN) 12 % lotion   No No   Sig: Apply topically 2 (two) times a day   atorvastatin (LIPITOR) 10 mg tablet   No No   Sig: Take 1 tablet (10 mg total) by mouth daily at bedtime   divalproex sodium (DEPAKOTE ER) 500 mg 24 hr tablet   Yes No   Sig: Take 1,250 mg by mouth daily at bedtime   ergocalciferol (VITAMIN D2) 50,000 units   No No   Sig: Take 1 capsule (50,000 Units total) by mouth once a week for 12 doses Do not start before July 6, 2024.   furosemide (LASIX) 20 mg tablet   No No   Sig: Take 1 tablet (20 mg total) by mouth daily   gabapentin (NEURONTIN) 100 mg capsule   No No   Sig: Take 2 capsules (200 mg total) by mouth 3 (three) times a day   lisinopril (ZESTRIL) 20 mg tablet   No No   Sig: Take 1 tablet (20 mg total) by mouth daily   loratadine (CLARITIN) 10 mg tablet  Self No No   Sig: Take 1 tablet (10 mg total) by mouth daily   Patient taking differently: Take 10 mg by mouth daily   meclizine (ANTIVERT) 12.5 MG tablet  " Self No No   Sig: Take 1 tablet (12.5 mg total) by mouth every 8 (eight) hours as needed for dizziness   melatonin 3 mg   No No   Sig: Take 1 tablet (3 mg total) by mouth daily at bedtime   nystatin (MYCOSTATIN) powder   No No   Sig: Apply topically 2 (two) times a day   pantoprazole (PROTONIX) 20 mg tablet   No No   Sig: Take 1 tablet (20 mg total) by mouth daily   prazosin (MINIPRESS) 2 mg capsule   No No   Sig: Take 1 capsule (2 mg total) by mouth daily at bedtime   risperiDONE (RisperDAL) 3 mg tablet   No No   Sig: Take 1 tablet (3 mg total) by mouth 2 (two) times a day   senna-docusate sodium (SENOKOT S) 8.6-50 mg per tablet   No No   Sig: Take 1 tablet by mouth daily at bedtime   traZODone (DESYREL) 50 mg tablet   No No   Sig: Take 1 tablet (50 mg total) by mouth daily at bedtime      Facility-Administered Medications: None       Past Medical History:   Diagnosis Date    Anxiety     Bipolar 1 disorder (HCC)     Bipolar affective disorder, depressed, severe (HCC) 10/10/2021    Borderline personality disorder ([facility])     CAD (coronary artery disease)     Cognitive impairment     Depression     Dyslipidemia     GERD (gastroesophageal reflux disease)     Hypertension     Obesity     Psychiatric disorder     Psychiatric illness     PTSD (post-traumatic stress disorder)     Schizoaffective disorder (HCC)     Seizure ([facility])        Past Surgical History:   Procedure Laterality Date    APPENDECTOMY      PATIENT DENIES HAVING APPENDIX REMOVED    CHOLECYSTECTOMY      FOOT SURGERY Right        Family History   Problem Relation Age of Onset    Kidney cancer Mother     Cerebral aneurysm Father      I have reviewed and agree with the history as documented.    E-Cigarette/Vaping    E-Cigarette Use Never User      E-Cigarette/Vaping Substances    Nicotine No     THC No     CBD No     Flavoring No     Other No     Unknown No      Social History     Tobacco Use    Smoking status: Former     Current packs/day: 0.25     Average  "packs/day: 0.3 packs/day for 0.5 years (0.1 ttl pk-yrs)     Types: Cigarettes, Cigars     Start date: 2/5/2024    Smokeless tobacco: Never    Tobacco comments:     Pt stated \"smokes when stressed\"   Vaping Use    Vaping status: Never Used   Substance Use Topics    Alcohol use: Not Currently     Comment: occasionally    Drug use: Not Currently       Review of Systems    Physical Exam  Physical Exam  Vitals and nursing note reviewed.   Constitutional:       General: She is not in acute distress.     Appearance: She is well-developed.   HENT:      Head: Normocephalic and atraumatic.      Right Ear: External ear normal.      Left Ear: External ear normal.   Eyes:      General: No scleral icterus.  Pulmonary:      Effort: Pulmonary effort is normal. No respiratory distress.   Abdominal:      General: There is no distension.   Musculoskeletal:         General: Normal range of motion.      Cervical back: Normal range of motion.   Skin:     General: Skin is warm and dry.      Findings: No rash.   Neurological:      Mental Status: She is alert. Mental status is at baseline.   Psychiatric:         Mood and Affect: Mood is depressed. Affect is tearful.         Thought Content: Thought content includes suicidal ideation.       Vital Signs  ED Triage Vitals [08/06/24 1727]   Temperature Pulse Respirations Blood Pressure SpO2   97.7 °F (36.5 °C) (!) 107 18 158/80 97 %      Temp Source Heart Rate Source Patient Position - Orthostatic VS BP Location FiO2 (%)   Temporal Monitor Sitting Right arm --      Pain Score       --           Vitals:    08/06/24 1727   BP: 158/80   Pulse: (!) 107   Patient Position - Orthostatic VS: Sitting         Visual Acuity      ED Medications  Medications   atorvastatin (LIPITOR) tablet 10 mg (has no administration in time range)   divalproex sodium (DEPAKOTE ER) 24 hr tablet 1,250 mg (has no administration in time range)   furosemide (LASIX) tablet 20 mg (has no administration in time range) "   gabapentin (NEURONTIN) capsule 200 mg (has no administration in time range)   lisinopril (ZESTRIL) tablet 20 mg (has no administration in time range)   traZODone (DESYREL) tablet 50 mg (has no administration in time range)   risperiDONE (RisperDAL M-TAB) disintegrating tablet 3 mg (3 mg Oral Given 8/6/24 2006)       Diagnostic Studies  Results Reviewed       Procedure Component Value Units Date/Time    Rapid drug screen, urine [[id number]]  (Normal) Collected: 08/06/24 1748    Lab Status: Final result Specimen: Urine Updated: 08/06/24 1825     Amph/Meth UR Negative     Barbiturate Ur Negative     Benzodiazepine Urine Negative     Cocaine Urine Negative     Methadone Urine Negative     Opiate Urine Negative     PCP Ur Negative     THC Urine Negative     Oxycodone Urine Negative     Fentanyl Urine Negative     HYDROCODONE URINE Negative    Narrative:      FOR MEDICAL PURPOSES ONLY.   IF CONFIRMATION NEEDED PLEASE CONTACT THE LAB WITHIN 5 DAYS.    Drug Screen Cutoff Levels:  AMPHETAMINE/METHAMPHETAMINES  1000 ng/mL  BARBITURATES     200 ng/mL  BENZODIAZEPINES     200 ng/mL  COCAINE      300 ng/mL  METHADONE      300 ng/mL  OPIATES      300 ng/mL  PHENCYCLIDINE     25 ng/mL  THC       50 ng/mL  OXYCODONE      100 ng/mL  FENTANYL      5 ng/mL  HYDROCODONE     300 ng/mL    POCT pregnancy, urine [[id number]]  (Normal) Resulted: 08/06/24 1746    Lab Status: Final result Specimen: Urine Updated: 08/06/24 1751     EXT Preg Test, Ur Negative     Control Valid    POCT alcohol breath test [[id number]]  (Normal) Resulted: 08/06/24 1746    Lab Status: Final result Updated: 08/06/24 1746     EXTBreath Alcohol 0.0                   No orders to display              Procedures  Procedures         ED Course  ED Course as of 08/06/24 2104   Tue Aug 06, 2024   2051 Discussed case with Sonya from crisis.  Plan for psychiatry consult   2057 Plan for Sign out to Dr. Arteaga with psych consult pending                                 SBIRT 22yo+       Flowsheet Row Most Recent Value   Initial Alcohol Screen: US AUDIT-C     1. How often do you have a drink containing alcohol? 0 Filed at: 08/06/2024 1727   2. How many drinks containing alcohol do you have on a typical day you are drinking?  0 Filed at: 08/06/2024 1727   3a. Male UNDER 65: How often do you have five or more drinks on one occasion? 0 Filed at: 08/06/2024 1727   3b. FEMALE Any Age, or MALE 65+: How often do you have 4 or more drinks on one occassion? 0 Filed at: 08/06/2024 1727   Audit-C Score 0 Filed at: 08/06/2024 1727   DAT: How many times in the past year have you...    Used an illegal drug or used a prescription medication for non-medical reasons? Never Filed at: 08/06/2024 1727                      Medical Decision Making  With extensive behavioral health history and multiple visits to the emergency department for similar symptoms.  Patient with high utilizer plan which were reviewed.  Patient's anxiety will be treated with her previously prescribed Risperdal.  I have discussed the case with him from crisis who has discussed with patient's group home.   Plan for psych eval    Amount and/or Complexity of Data Reviewed  External Data Reviewed: notes.     Details: Recent Crisis notes reviewed  Labs: ordered.    Risk  Prescription drug management.                 Disposition  Final diagnoses:   Depression with suicidal ideation   Schizoaffective disorder, depressive type (HCC)     Time reflects when diagnosis was documented in both MDM as applicable and the Disposition within this note       Time User Action Codes Description Comment    8/6/2024  8:51 PM Freddy Davila Add [F32.A,  R45.851] Depression with suicidal ideation     8/6/2024  8:51 PM Freddy Davila Add [F25.1] Schizoaffective disorder, depressive type (HCC)           ED Disposition       None          Follow-up Information    None         Patient's Medications   Discharge Prescriptions    No medications on file       No discharge  procedures on file.    PDMP Review         Value Time User    PDMP Reviewed  Yes 7/4/2024  9:55 AM SHANI Hawk            ED Provider  Electronically Signed by             Freddy Davila DO  08/07/24 7737

## 2024-08-07 ENCOUNTER — HOSPITAL ENCOUNTER (INPATIENT)
Facility: HOSPITAL | Age: 54
LOS: 23 days | Discharge: HOME | DRG: 885 | End: 2024-08-30
Attending: PSYCHIATRY & NEUROLOGY | Admitting: PSYCHIATRY & NEUROLOGY
Payer: MEDICARE

## 2024-08-07 VITALS
TEMPERATURE: 97.8 F | RESPIRATION RATE: 17 BRPM | OXYGEN SATURATION: 95 % | HEART RATE: 80 BPM | DIASTOLIC BLOOD PRESSURE: 74 MMHG | SYSTOLIC BLOOD PRESSURE: 130 MMHG

## 2024-08-07 DIAGNOSIS — G89.29 CHRONIC MIDLINE LOW BACK PAIN WITHOUT SCIATICA: ICD-10-CM

## 2024-08-07 DIAGNOSIS — I10 PRIMARY HYPERTENSION: ICD-10-CM

## 2024-08-07 DIAGNOSIS — R60.0 BILATERAL LEG EDEMA: ICD-10-CM

## 2024-08-07 DIAGNOSIS — F32.A DEPRESSION WITH SUICIDAL IDEATION: ICD-10-CM

## 2024-08-07 DIAGNOSIS — R07.89 ATYPICAL CHEST PAIN: ICD-10-CM

## 2024-08-07 DIAGNOSIS — M54.50 CHRONIC MIDLINE LOW BACK PAIN WITHOUT SCIATICA: ICD-10-CM

## 2024-08-07 DIAGNOSIS — R42 VERTIGO: ICD-10-CM

## 2024-08-07 DIAGNOSIS — F39 MOOD DISORDER (HCC): ICD-10-CM

## 2024-08-07 DIAGNOSIS — L60.2 OVERGROWN TOENAILS: ICD-10-CM

## 2024-08-07 DIAGNOSIS — R45.851 DEPRESSION WITH SUICIDAL IDEATION: ICD-10-CM

## 2024-08-07 DIAGNOSIS — F25.1 SCHIZOAFFECTIVE DISORDER, DEPRESSIVE TYPE (HCC): Primary | ICD-10-CM

## 2024-08-07 DIAGNOSIS — F43.10 PTSD (POST-TRAUMATIC STRESS DISORDER): ICD-10-CM

## 2024-08-07 DIAGNOSIS — K59.00 CONSTIPATION: ICD-10-CM

## 2024-08-07 DIAGNOSIS — J30.1 SEASONAL ALLERGIC RHINITIS DUE TO POLLEN: ICD-10-CM

## 2024-08-07 DIAGNOSIS — E55.9 VITAMIN D DEFICIENCY: ICD-10-CM

## 2024-08-07 DIAGNOSIS — E78.2 MIXED HYPERLIPIDEMIA: ICD-10-CM

## 2024-08-07 LAB
ALBUMIN SERPL BCG-MCNC: 3.9 G/DL (ref 3.5–5)
ALP SERPL-CCNC: 60 U/L (ref 34–104)
ALT SERPL W P-5'-P-CCNC: 17 U/L (ref 7–52)
ANION GAP SERPL CALCULATED.3IONS-SCNC: 7 MMOL/L (ref 4–13)
AST SERPL W P-5'-P-CCNC: 11 U/L (ref 13–39)
BASOPHILS # BLD MANUAL: 0.07 THOUSAND/UL (ref 0–0.1)
BASOPHILS NFR MAR MANUAL: 1 % (ref 0–1)
BILIRUB SERPL-MCNC: 0.49 MG/DL (ref 0.2–1)
BUN SERPL-MCNC: 10 MG/DL (ref 5–25)
CALCIUM SERPL-MCNC: 9.3 MG/DL (ref 8.4–10.2)
CHLORIDE SERPL-SCNC: 98 MMOL/L (ref 96–108)
CO2 SERPL-SCNC: 33 MMOL/L (ref 21–32)
CREAT SERPL-MCNC: 0.47 MG/DL (ref 0.6–1.3)
EOSINOPHIL # BLD MANUAL: 0.14 THOUSAND/UL (ref 0–0.4)
EOSINOPHIL NFR BLD MANUAL: 2 % (ref 0–6)
ERYTHROCYTE [DISTWIDTH] IN BLOOD BY AUTOMATED COUNT: 14.6 % (ref 11.6–15.1)
GFR SERPL CREATININE-BSD FRML MDRD: 113 ML/MIN/1.73SQ M
GLUCOSE SERPL-MCNC: 140 MG/DL (ref 65–140)
HCT VFR BLD AUTO: 35.9 % (ref 34.8–46.1)
HGB BLD-MCNC: 12 G/DL (ref 11.5–15.4)
LYMPHOCYTES # BLD AUTO: 1.39 THOUSAND/UL (ref 0.6–4.47)
LYMPHOCYTES # BLD AUTO: 20 % (ref 14–44)
MCH RBC QN AUTO: 31.8 PG (ref 26.8–34.3)
MCHC RBC AUTO-ENTMCNC: 33.4 G/DL (ref 31.4–37.4)
MCV RBC AUTO: 95 FL (ref 82–98)
MONOCYTES # BLD AUTO: 0.28 THOUSAND/UL (ref 0–1.22)
MONOCYTES NFR BLD: 4 % (ref 4–12)
NEUTROPHILS # BLD MANUAL: 5.06 THOUSAND/UL (ref 1.85–7.62)
NEUTS SEG NFR BLD AUTO: 73 % (ref 43–75)
PLATELET # BLD AUTO: 169 THOUSANDS/UL (ref 149–390)
PLATELET BLD QL SMEAR: ADEQUATE
PMV BLD AUTO: 9.8 FL (ref 8.9–12.7)
POTASSIUM SERPL-SCNC: 4.4 MMOL/L (ref 3.5–5.3)
PROT SERPL-MCNC: 7 G/DL (ref 6.4–8.4)
RBC # BLD AUTO: 3.77 MILLION/UL (ref 3.81–5.12)
RBC MORPH BLD: NORMAL
SODIUM SERPL-SCNC: 138 MMOL/L (ref 135–147)
TSH SERPL DL<=0.05 MIU/L-ACNC: 2.18 UIU/ML (ref 0.45–4.5)
WBC # BLD AUTO: 6.93 THOUSAND/UL (ref 4.31–10.16)

## 2024-08-07 PROCEDURE — 84443 ASSAY THYROID STIM HORMONE: CPT | Performed by: EMERGENCY MEDICINE

## 2024-08-07 PROCEDURE — 80053 COMPREHEN METABOLIC PANEL: CPT | Performed by: EMERGENCY MEDICINE

## 2024-08-07 PROCEDURE — 93005 ELECTROCARDIOGRAM TRACING: CPT

## 2024-08-07 PROCEDURE — 85027 COMPLETE CBC AUTOMATED: CPT | Performed by: EMERGENCY MEDICINE

## 2024-08-07 PROCEDURE — 85007 BL SMEAR W/DIFF WBC COUNT: CPT | Performed by: EMERGENCY MEDICINE

## 2024-08-07 PROCEDURE — 36415 COLL VENOUS BLD VENIPUNCTURE: CPT | Performed by: EMERGENCY MEDICINE

## 2024-08-07 RX ORDER — LORAZEPAM 2 MG/ML
1 INJECTION INTRAMUSCULAR
Status: DISCONTINUED | OUTPATIENT
Start: 2024-08-07 | End: 2024-08-30 | Stop reason: HOSPADM

## 2024-08-07 RX ORDER — OLANZAPINE 5 MG/1
5 TABLET ORAL
Status: DISCONTINUED | OUTPATIENT
Start: 2024-08-07 | End: 2024-08-30 | Stop reason: HOSPADM

## 2024-08-07 RX ORDER — ACETAMINOPHEN 325 MG/1
650 TABLET ORAL EVERY 4 HOURS PRN
Status: CANCELLED | OUTPATIENT
Start: 2024-08-07

## 2024-08-07 RX ORDER — LORAZEPAM 2 MG/ML
1 INJECTION INTRAMUSCULAR
Status: CANCELLED | OUTPATIENT
Start: 2024-08-07

## 2024-08-07 RX ORDER — HYDROXYZINE HYDROCHLORIDE 25 MG/1
25 TABLET, FILM COATED ORAL
Status: DISCONTINUED | OUTPATIENT
Start: 2024-08-07 | End: 2024-08-30 | Stop reason: HOSPADM

## 2024-08-07 RX ORDER — OLANZAPINE 2.5 MG/1
2.5 TABLET, FILM COATED ORAL
Status: DISCONTINUED | OUTPATIENT
Start: 2024-08-07 | End: 2024-08-30 | Stop reason: HOSPADM

## 2024-08-07 RX ORDER — ACETAMINOPHEN 325 MG/1
975 TABLET ORAL EVERY 6 HOURS PRN
Status: CANCELLED | OUTPATIENT
Start: 2024-08-07

## 2024-08-07 RX ORDER — LISINOPRIL 20 MG/1
20 TABLET ORAL DAILY
Status: DISCONTINUED | OUTPATIENT
Start: 2024-08-08 | End: 2024-08-30 | Stop reason: HOSPADM

## 2024-08-07 RX ORDER — HYDROXYZINE HYDROCHLORIDE 25 MG/1
25 TABLET, FILM COATED ORAL
Status: CANCELLED | OUTPATIENT
Start: 2024-08-07

## 2024-08-07 RX ORDER — LORAZEPAM 1 MG/1
1 TABLET ORAL
Status: DISCONTINUED | OUTPATIENT
Start: 2024-08-07 | End: 2024-08-30 | Stop reason: HOSPADM

## 2024-08-07 RX ORDER — GABAPENTIN 100 MG/1
200 CAPSULE ORAL 3 TIMES DAILY
Status: DISCONTINUED | OUTPATIENT
Start: 2024-08-07 | End: 2024-08-09

## 2024-08-07 RX ORDER — FUROSEMIDE 20 MG
20 TABLET ORAL DAILY
Status: CANCELLED | OUTPATIENT
Start: 2024-08-08

## 2024-08-07 RX ORDER — GABAPENTIN 100 MG/1
200 CAPSULE ORAL 3 TIMES DAILY
Status: CANCELLED | OUTPATIENT
Start: 2024-08-07

## 2024-08-07 RX ORDER — BENZTROPINE MESYLATE 0.5 MG/1
0.5 TABLET ORAL
Status: DISCONTINUED | OUTPATIENT
Start: 2024-08-07 | End: 2024-08-30 | Stop reason: HOSPADM

## 2024-08-07 RX ORDER — BENZTROPINE MESYLATE 0.5 MG/1
0.5 TABLET ORAL
Status: CANCELLED | OUTPATIENT
Start: 2024-08-07

## 2024-08-07 RX ORDER — LORAZEPAM 1 MG/1
1 TABLET ORAL
Status: CANCELLED | OUTPATIENT
Start: 2024-08-07

## 2024-08-07 RX ORDER — OLANZAPINE 2.5 MG/1
5 TABLET, FILM COATED ORAL
Status: CANCELLED | OUTPATIENT
Start: 2024-08-07

## 2024-08-07 RX ORDER — ATORVASTATIN CALCIUM 10 MG/1
10 TABLET, FILM COATED ORAL
Status: CANCELLED | OUTPATIENT
Start: 2024-08-07

## 2024-08-07 RX ORDER — OLANZAPINE 2.5 MG/1
2.5 TABLET, FILM COATED ORAL
Status: CANCELLED | OUTPATIENT
Start: 2024-08-07

## 2024-08-07 RX ORDER — ATORVASTATIN CALCIUM 10 MG/1
10 TABLET, FILM COATED ORAL
Status: DISCONTINUED | OUTPATIENT
Start: 2024-08-08 | End: 2024-08-30 | Stop reason: HOSPADM

## 2024-08-07 RX ORDER — ACETAMINOPHEN 325 MG/1
650 TABLET ORAL EVERY 4 HOURS PRN
Status: DISCONTINUED | OUTPATIENT
Start: 2024-08-07 | End: 2024-08-30 | Stop reason: HOSPADM

## 2024-08-07 RX ORDER — LORAZEPAM 0.5 MG/1
0.5 TABLET ORAL
Status: DISCONTINUED | OUTPATIENT
Start: 2024-08-07 | End: 2024-08-30 | Stop reason: HOSPADM

## 2024-08-07 RX ORDER — OLANZAPINE 10 MG/2ML
5 INJECTION, POWDER, FOR SOLUTION INTRAMUSCULAR
Status: DISCONTINUED | OUTPATIENT
Start: 2024-08-07 | End: 2024-08-30 | Stop reason: HOSPADM

## 2024-08-07 RX ORDER — FUROSEMIDE 20 MG
20 TABLET ORAL DAILY
Status: DISCONTINUED | OUTPATIENT
Start: 2024-08-08 | End: 2024-08-30 | Stop reason: HOSPADM

## 2024-08-07 RX ORDER — TRAZODONE HYDROCHLORIDE 50 MG/1
50 TABLET, FILM COATED ORAL
Status: DISCONTINUED | OUTPATIENT
Start: 2024-08-07 | End: 2024-08-30 | Stop reason: HOSPADM

## 2024-08-07 RX ORDER — LISINOPRIL 20 MG/1
20 TABLET ORAL DAILY
Status: CANCELLED | OUTPATIENT
Start: 2024-08-08

## 2024-08-07 RX ORDER — ACETAMINOPHEN 325 MG/1
975 TABLET ORAL EVERY 6 HOURS PRN
Status: DISCONTINUED | OUTPATIENT
Start: 2024-08-07 | End: 2024-08-30 | Stop reason: HOSPADM

## 2024-08-07 RX ORDER — LORAZEPAM 0.5 MG/1
0.5 TABLET ORAL
Status: CANCELLED | OUTPATIENT
Start: 2024-08-07

## 2024-08-07 RX ORDER — TRAZODONE HYDROCHLORIDE 50 MG/1
50 TABLET, FILM COATED ORAL
Status: CANCELLED | OUTPATIENT
Start: 2024-08-07

## 2024-08-07 RX ORDER — OLANZAPINE 10 MG/2ML
5 INJECTION, POWDER, FOR SOLUTION INTRAMUSCULAR
Status: CANCELLED | OUTPATIENT
Start: 2024-08-07

## 2024-08-07 RX ADMIN — DIVALPROEX SODIUM 1250 MG: 500 TABLET, FILM COATED, EXTENDED RELEASE ORAL at 22:02

## 2024-08-07 RX ADMIN — GABAPENTIN 200 MG: 100 CAPSULE ORAL at 18:08

## 2024-08-07 RX ADMIN — ATORVASTATIN CALCIUM 10 MG: 10 TABLET, FILM COATED ORAL at 18:08

## 2024-08-07 RX ADMIN — TRAZODONE HYDROCHLORIDE 50 MG: 50 TABLET ORAL at 22:02

## 2024-08-07 RX ADMIN — FUROSEMIDE 20 MG: 20 TABLET ORAL at 09:32

## 2024-08-07 RX ADMIN — GABAPENTIN 200 MG: 100 CAPSULE ORAL at 09:32

## 2024-08-07 RX ADMIN — LISINOPRIL 20 MG: 20 TABLET ORAL at 09:32

## 2024-08-07 RX ADMIN — GABAPENTIN 200 MG: 100 CAPSULE ORAL at 22:02

## 2024-08-07 NOTE — ED NOTES
Manda from [facility] called to confirm the patient is able to go to any Facility who is in network with [facility].  [facility] recommends the ACT Team from [facility] proceed with the EAC for placement after pt's discharge.

## 2024-08-07 NOTE — ED NOTES
"Crisis received a call from Dana (Herman Jarquin- Therapist) requesting that patient is referred to a hospital in network with [facility]. Dana shared that the hope is to get Leydi into an EAC following this IP admission due to her high utilization of services. Dana shared that it was reported by [facility] that they can assist in the placement in EAC but only if patient is placed within a [facility] hospital who is in network with [facility]. Dana stated that [facility] Jarquin is \"not capable of taking her back.\" Crisis informed Dana that patient has expressed interested in being referred to only [facility] and has denied another placement when a bed was available. Dana requested to speak with patient regarding being open to other placement options. Crisis informed Dana that patient is being referred by [facility] and denying this bed to search for a [facility] hospital could extend patients wait in ER. Dana spoke with patient. Following conversation Dana stated that patient would be open to going to a [facility] with a preference of [facility] or [facility] [place].     Dana requested to be update regarding placement via email [email or web address].     Crisis intake updated   "

## 2024-08-07 NOTE — ED NOTES
Bed Search    INTAKE (faxed for AM consideration)  Sapna Garcia (Geraldo) fax for review  Chandrika (Corina) fax for d/c bed consideration

## 2024-08-07 NOTE — ED NOTES
PC from [facility]. Pt can be accepted but has no Medicare days left. Transpo time will be determined after precert is completed.

## 2024-08-07 NOTE — ED NOTES
Pt just completed a Psych Evaluation thru [facility].  Recommendation for pt to sign a 201 for inpatient mental health treatment.

## 2024-08-07 NOTE — ED NOTES
Insurance Authorization for admission:   Phone call placed to [facility].  Phone number: [phone].     Spoke to Natty DANY.     03 days approved.  Level of care: Inpatient Behavioral Health (201).  Review on **.   Authorization # To be given upon admission.        Eligibility Verification System checked - ([phone]).  Online system / automated system indicates:     [address] AND [facility]08/07/202408/07/2024  [facility]08/07/202408/07/2024     Other or Additional PayorMEDICARE PART B08/07/202408/07/2024  Other or Additional PayorMEDICARE PART A08/07/202408/07/2024    Insurance Authorization for Transportation:    Phone call placed to **.   Phone number **.   Spoke to **.   Authorization #: **

## 2024-08-07 NOTE — ED NOTES
Called Ondina Zimmerman spoke to Letitia to inform her that the pt will be going in for inpatient mental health treatment.
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
## 2024-08-07 NOTE — ED CARE HANDOFF
Emergency Department Sign Out Note        Sign out and transfer of care from Dr. Arteaga. See Separate Emergency Department note.     The patient, Leydi Khan, was evaluated by the previous provider for depression with SI.  201 was signed..    Workup Completed:  Patient was seen by on-call psychiatry.    ED Course / Workup Pending (followup):  Patient is pending placement.  Seen by me today.  She is awake and alert and oriented.  She is ordered for her daily medications.  Patient had breakfast this morning.  Currently offers no complaints except for feeling depressed.  Discussed with ED crisis worker and patient requires additional labs for placement.  These labs were ordered.      On review all labs are within normal limits.  EKG read by me is normal sinus rhythm with no ischemic changes heart rate is 87 QTc is 430.                                  ED Course as of 08/07/24 1636   Wed Aug 07, 2024   0709 S/o from Dr. Arteaga. Patient has been seen by ED crisis. Telepsychiatry consult completed. Patient has signed 201. Pending placement.    1048 Patient is medically cleared for inpatient psychiatry admission.  EKG reviewed by me shows normal sinus rhythm, QTc 430.  No ischemic changes.     Procedures  Medical Decision Making  Amount and/or Complexity of Data Reviewed  Labs: ordered.    Risk  Prescription drug management.            Disposition  Final diagnoses:   Depression with suicidal ideation   Schizoaffective disorder, depressive type (HCC)     Time reflects when diagnosis was documented in both MDM as applicable and the Disposition within this note       Time User Action Codes Description Comment    8/6/2024  8:51 PM Freddy Davila Add [F32.A,  R45.851] Depression with suicidal ideation     8/6/2024  8:51 PM Freddy Davila Add [F25.1] Schizoaffective disorder, depressive type (HCC)     8/7/2024  4:18 PM Aditya Farias Add [I10] Primary hypertension     8/7/2024  4:18 PM Aditya Farias Add [E78.2] Mixed  hyperlipidemia     8/7/2024  4:18 PM Aditya Farias Add [M54.50,  G89.29] Chronic midline low back pain without sciatica           ED Disposition       None          MD Documentation      Flowsheet Row Most Recent Value   Sending MD Dr Fatuma Arteaga, DO          Follow-up Information    None       Patient's Medications   Discharge Prescriptions    No medications on file     No discharge procedures on file.       ED Provider  Electronically Signed by     Freya Mayen MD  08/07/24 5865

## 2024-08-07 NOTE — ED NOTES
Patient is accepted at [facility]  Patient is accepted by Dr. Marietta Gu     Transportation is arranged with [facility].     Transportation is scheduled for  2030 CTS  Patient may go to the floor at.  2030        Nurse report is to be called to [phone] prior to patient transfer.

## 2024-08-07 NOTE — ED NOTES
[facility]    ID# [id number]    [id number] 08/07/2024 08/07/2024  [facility] 08/07/2024 08/07/2024    Other or Additional Payor MEDICARE PART B 08/07/2024 08/07/2024  Other or Additional Payor MEDICARE PART A 08/07/2024 08/07/2024

## 2024-08-07 NOTE — ED NOTES
This worker woke Pt to assess if Pt would be amenable to return to [place] for IP TX. Pt thought about it for a moment and stated that she did not wish to return there based on a previous experience. Pt was made aware an in network bed may not be available at [facility] as she requested. Pt stated that she is willing to wait.

## 2024-08-07 NOTE — ED NOTES
Jud ([facility] , [phone] ext [phone]) requesting an update. Bed Search update provided.

## 2024-08-07 NOTE — ED NOTES
PC to [facility], [phone]. Message left w/ CB info for precert.

## 2024-08-07 NOTE — EMTALA/ACUTE CARE TRANSFER
[facility] EMERGENCY DEPARTMENT  [address] [facility] [address]  [place] [address]  Dept: [phone]      EMTALA TRANSFER CONSENT    NAME Leydi VELASQUEZ 1970                              MRN [id number]    I have been informed of my rights regarding examination, treatment, and transfer   by Dr. Freya Mayen MD    Benefits: Specialized equipment and/or services available at the receiving facility (Include comment)________________________ (inpatient psychiatry)    Risks: Potential for delay in receiving treatment, Potential deterioration of medical condition, Increased discomfort during transfer, Possible worsening of condition or death during transfer      Consent for Transfer:  I acknowledge that my medical condition has been evaluated and explained to me by the emergency department physician or other qualified medical person and/or my attending physician, who has recommended that I be transferred to the service of  Accepting Physician: Dr. Mcmanus at Accepting Facility Name, City & State : [facility]. The above potential benefits of such transfer, the potential risks associated with such transfer, and the probable risks of not being transferred have been explained to me, and I fully understand them.  The doctor has explained that, in my case, the benefits of transfer outweigh the risks.  I agree to be transferred.    I authorize the performance of emergency medical procedures and treatments upon me in both transit and upon arrival at the receiving facility.  Additionally, I authorize the release of any and all medical records to the receiving facility and request they be transported with me, if possible.  I understand that the safest mode of transportation during a medical emergency is an ambulance and that the Hospital advocates the use of this mode of transport. Risks of traveling to the receiving facility by car, including absence of  medical control, life sustaining equipment, such as oxygen, and medical personnel has been explained to me and I fully understand them.    (PRAKASH CORRECT BOX BELOW)  [  ]  I consent to the stated transfer and to be transported by ambulance/helicopter.  [  ]  I consent to the stated transfer, but refuse transportation by ambulance and accept full responsibility for my transportation by car.  I understand the risks of non-ambulance transfers and I exonerate the Hospital and its staff from any deterioration in my condition that results from this refusal.    X___________________________________________    DATE  24  TIME________  Signature of patient or legally responsible individual signing on patient behalf           RELATIONSHIP TO PATIENT_________________________          Provider Certification    NAME Leydi Khan                                         1970                              MRN [id number]    A medical screening exam was performed on the above named patient.  Based on the examination:    Condition Necessitating Transfer The primary encounter diagnosis was Depression with suicidal ideation. Diagnoses of Schizoaffective disorder, depressive type (HCC), Primary hypertension, Mixed hyperlipidemia, and Chronic midline low back pain without sciatica were also pertinent to this visit.    Patient Condition: The patient has been stabilized such that within reasonable medical probability, no material deterioration of the patient condition or the condition of the unborn child(courtney) is likely to result from the transfer    Reason for Transfer: Level of Care needed not available at this facility (inpatient psychiatry)    Transfer Requirements: Facility [facility]   Space available and qualified personnel available for treatment as acknowledged by ED Crisis  Agreed to accept transfer and to provide appropriate medical treatment as acknowledged by       Dr. Mcmanus  Appropriate medical records of the  examination and treatment of the patient are provided at the time of transfer   STAFF INITIAL WHEN COMPLETED _______  Transfer will be performed by qualified personnel from [facility] or [facility]  and appropriate transfer equipment as required, including the use of necessary and appropriate life support measures.    Provider Certification: I have examined the patient and explained the following risks and benefits of being transferred/refusing transfer to the patient/family:  The patient is stable for psychiatric transfer because they are medically stable, and is protected from harming him/herself or others during transport      Based on these reasonable risks and benefits to the patient and/or the unborn child(courtney), and based upon the information available at the time of the patient’s examination, I certify that the medical benefits reasonably to be expected from the provision of appropriate medical treatments at another medical facility outweigh the increasing risks, if any, to the individual’s medical condition, and in the case of labor to the unborn child, from effecting the transfer.    X____________________________________________ DATE 08/07/24        TIME_______      ORIGINAL - SEND TO MEDICAL RECORDS   COPY - SEND WITH PATIENT DURING TRANSFER

## 2024-08-07 NOTE — ED NOTES
Patient made aware of being the previous referral to [facility] per her request as there is a bed. Patient stated she wanted to go to [facility] and be referred to a [facility]. Crisis notified intake and completed bed search.     Bed Search:    [facility] ([facility]) clinicals faxed  [facility] ([facility]) clinicals faxed.  [facility] ([facility]) no beds

## 2024-08-08 LAB
25(OH)D3 SERPL-MCNC: 19.3 NG/ML (ref 30–100)
VIT B12 SERPL-MCNC: 433 PG/ML (ref 180–914)

## 2024-08-08 PROCEDURE — 87081 CULTURE SCREEN ONLY: CPT

## 2024-08-08 PROCEDURE — 82306 VITAMIN D 25 HYDROXY: CPT

## 2024-08-08 PROCEDURE — 99223 1ST HOSP IP/OBS HIGH 75: CPT | Performed by: PSYCHIATRY & NEUROLOGY

## 2024-08-08 PROCEDURE — 82607 VITAMIN B-12: CPT

## 2024-08-08 RX ORDER — RISPERIDONE 2 MG/1
2 TABLET ORAL
Status: DISCONTINUED | OUTPATIENT
Start: 2024-08-08 | End: 2024-08-12

## 2024-08-08 RX ORDER — CLONAZEPAM 1 MG/1
1 TABLET ORAL 2 TIMES DAILY
COMMUNITY
End: 2024-08-30

## 2024-08-08 RX ORDER — DESVENLAFAXINE 100 MG/1
125 TABLET, EXTENDED RELEASE ORAL DAILY
COMMUNITY
End: 2024-08-30

## 2024-08-08 RX ORDER — NYSTATIN 100000 [USP'U]/G
POWDER TOPICAL 2 TIMES DAILY PRN
Status: DISCONTINUED | OUTPATIENT
Start: 2024-08-08 | End: 2024-08-12

## 2024-08-08 RX ORDER — AMMONIUM LACTATE 12 G/100G
LOTION TOPICAL 2 TIMES DAILY
Status: DISCONTINUED | OUTPATIENT
Start: 2024-08-08 | End: 2024-08-27

## 2024-08-08 RX ORDER — PRAZOSIN HYDROCHLORIDE 1 MG/1
1 CAPSULE ORAL
Status: DISCONTINUED | OUTPATIENT
Start: 2024-08-08 | End: 2024-08-30 | Stop reason: HOSPADM

## 2024-08-08 RX ORDER — PANTOPRAZOLE SODIUM 20 MG/1
20 TABLET, DELAYED RELEASE ORAL
Status: DISCONTINUED | OUTPATIENT
Start: 2024-08-09 | End: 2024-08-30 | Stop reason: HOSPADM

## 2024-08-08 RX ADMIN — GABAPENTIN 200 MG: 100 CAPSULE ORAL at 21:04

## 2024-08-08 RX ADMIN — HYDROXYZINE HYDROCHLORIDE 25 MG: 25 TABLET ORAL at 08:16

## 2024-08-08 RX ADMIN — TRAZODONE HYDROCHLORIDE 50 MG: 50 TABLET ORAL at 21:04

## 2024-08-08 RX ADMIN — ATORVASTATIN CALCIUM 10 MG: 10 TABLET, FILM COATED ORAL at 16:36

## 2024-08-08 RX ADMIN — PRAZOSIN HYDROCHLORIDE 1 MG: 1 CAPSULE ORAL at 21:04

## 2024-08-08 RX ADMIN — HYDROXYZINE HYDROCHLORIDE 25 MG: 25 TABLET ORAL at 16:36

## 2024-08-08 RX ADMIN — GABAPENTIN 200 MG: 100 CAPSULE ORAL at 16:36

## 2024-08-08 RX ADMIN — LISINOPRIL 20 MG: 20 TABLET ORAL at 08:16

## 2024-08-08 RX ADMIN — DIVALPROEX SODIUM 1250 MG: 500 TABLET, FILM COATED, EXTENDED RELEASE ORAL at 21:02

## 2024-08-08 RX ADMIN — RISPERIDONE 2 MG: 2 TABLET, FILM COATED ORAL at 21:03

## 2024-08-08 RX ADMIN — GABAPENTIN 200 MG: 100 CAPSULE ORAL at 08:15

## 2024-08-08 RX ADMIN — LORAZEPAM 0.5 MG: 0.5 TABLET ORAL at 21:03

## 2024-08-08 RX ADMIN — FUROSEMIDE 20 MG: 20 TABLET ORAL at 08:15

## 2024-08-08 RX ADMIN — Medication: at 21:02

## 2024-08-08 NOTE — NURSING NOTE
Patient is pleasant and appropriate upon interaction. Requires reassurance and support for ongoing anxiety. Prn atarax 25 mg is effective per patient this morning. Patient endorses high depression and wishes not to return to [place]. Patient endorses SI with no plan or intent. Patient is able to contract for safety. Patient is compliant with medications. No acute behaviors noted. Q 7 min safety checks maintained. Fall precautions maintained.

## 2024-08-08 NOTE — PROGRESS NOTES
08/08/24 1226   Activity/Group Checklist   Group Admission/Discharge   Attendance Attended   Attendance Duration (min) 0-15   Interactions Interacted appropriately   Affect/Mood Appropriate   Goals Achieved Identified feelings;Identified triggers;Discussed coping strategies;Able to listen to others;Able to engage in interactions;Able to reflect/comment on own behavior;Able to manage/cope with feelings;Able to self-disclose;Able to recieve feedback     Patient agreeable to meet and complete self assessment with [facility].  Patient information from form can be found in media.

## 2024-08-08 NOTE — PROGRESS NOTES
08/08/24    Team Meeting   Meeting Type Daily Rounds   Team Members Present   Team Members Present Physician;Nurse;;Occupational Therapist   Physician Team Member Marietta MORALES    Nursing Team Member Aleta VALDEZ   Social Work Team Member Justice DE LA TORRE   OT Team Member Murphy DINERO   Patient/Family Present   Patient Present No   Patient's Family Present No   New admit, 201, high dep and anx, si with no plan, PRN, crayons only, finger foods, papers, contracts for safety, feels lonely, no d/c date, med complaint

## 2024-08-08 NOTE — NURSING NOTE
"Patient admitted to [facility] under a 201 status.  Patient arrived to the unit; ambulates with a steady gait.  She is alert and oriented x4; denies any pain.  She was cooperative with the admission process, including signing all paperwork.      She states she is \"fed up with my group home.\" And \"people there stress me out.\"  Endorses high anxiety and depression.  Denies SI at time of assessment, denies HI and hallucinations. She informs that she felt suicidal in the recent past but did not have a plan.  She informs she feels sad and lonely.     Skin assessment performed by two nurses (ABDI, JO) with the following observations:  pink/ reddened area under the right breast, reddened areas under the b/l groin/ pannus, sacrum reddened as well; no open areas noted.  Bottoms of b/l feet are dry and calloused.  Right outer great toe with callous.    Patient did shower and was provided paper pants and shirt.  She was medication compliant at .  Leydi gave verbal consent for this writer to contact [facility] ([facility]ida) to obtain a list of her current medications as the patient could not remember what all of her medications are.  C-SSRS scoring of low for lifetime and last contact were reported to on-call psychiatry, Dr. Pisano. Patient received snack and fluids tonight which she consumed without issue. Continuous safety rounding in progress.   "

## 2024-08-08 NOTE — PLAN OF CARE
Problem: Ineffective Coping  Goal: Cooperates with admission process  Description: Interventions:   - Complete admission process  Outcome: Progressing   New admit as of 8/7/2024 at 2104; care plan initiated.

## 2024-08-08 NOTE — PROGRESS NOTES
08/08/24 1636   Smith Anxiety Scale   Anxious Mood 3   Tension 3   Fears 2   Insomnia 0   Intellectual 2   Depressed Mood 2   Somatic Complaints: Muscular 0   Somatic Complaints: Sensory 0   Cardiovascular Symptoms 0   Respiratory Symptoms 0   Gastrointestinal Symptoms 0   Genitourinary Symptoms 0   Autonomic Symptoms 2   Behavior at Interview 2   Smith Anxiety Score 16       Patient complains of increased anxiety, requesting PRN Atarax, unable to redirect verbally, with exercise, diversion activity, food/fluids. PRN Atarax 25 mg given at as ordered. Remains on Q 7 minute checks for safety and behaviors. PRN moderately effective per patient.

## 2024-08-08 NOTE — PROGRESS NOTES
08/08/24 0816   Smith Anxiety Scale   Anxious Mood 2   Tension 2   Fears 2   Insomnia 0   Intellectual 2   Depressed Mood 2   Somatic Complaints: Muscular 0   Somatic Complaints: Sensory 0   Cardiovascular Symptoms 0   Respiratory Symptoms 0   Gastrointestinal Symptoms 0   Genitourinary Symptoms 0   Autonomic Symptoms 1   Behavior at Interview 2   Smith Anxiety Score 13       Patient complained of anxiety. Smith scale 13. Atarax 25 mg given per order. Will monitor for prn effects.
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
## 2024-08-08 NOTE — TREATMENT TEAM
08/08/24 0446   Provider Notification   Reason for Communication Admission   Provider Name Dr. Pisano/ [facility]   Provider Role Attending physician   Method of Communication Other (Comment)  (via Epic Secure and medical clipboard)   Response No new orders   Notification Time 0446   Shift Event Other (Comment)  (informed providers of PTA med list/ reconcilation completed.)     PTA medication list was reconciled utilizing a faxed copy of patient's current med list sent by Shahida from [facility] on 8/8/2024.  On-call psychiatry, Dr. Pisano was made aware via [facility] and on-call medical was made aware via medical clipboard.

## 2024-08-08 NOTE — TREATMENT PLAN
TREATMENT PLAN REVIEW - Behavioral Health Leydi Khan 54 y.o. 1970 female MRN: [id number]    [facility] Room / Bed: [facility] 209/[facility] 209-02 Encounter: [id number]          Admit Date/Time:  8/7/2024  9:04 PM    Treatment Team:   MD Sudhir Levine MD Amber Pendergast Loree Smith Pope, LPC Nicole L Saas Isabelle Kenna Cailie Niebell Paul F Klose, JOSÉ MIGUEL River RN    Diagnosis: Principal Problem:    Schizoaffective disorder, depressive type ([facility])  Active Problems:    Primary hypertension    Hyperlipidemia    Class 3 severe obesity due to excess calories without serious comorbidity in adult (HCC)    PTSD (post-traumatic stress disorder)    Borderline personality disorder ([facility])    Bilateral leg edema    Patient Strengths/Assets: negotiates basic needs, patient is on a voluntary commitment    Patient Barriers/Limitations: difficulty adapting, poor insight, poor interpersonal skills, poor physical health    Short Term Goals: decrease in depressive symptoms, decrease in anxiety symptoms, decrease in suicidal thoughts, ability to stay safe on the unit, ability to stay free of restraints, improvement in reasoning ability, improvement in self care, sleep improvement, improvement in appetite, mood stabilization, increase in group attendance, increase in socialization with peers on the unit, acceptance of need for psychiatric treatment, acceptance of psychiatric medications    Long Term Goals: improvement in depression, improvement in anxiety, resolution of depressive symptoms, stabilization of mood, free of suicidal thoughts, improvement in reasoning ability, acceptance of need for psychiatric medications, acceptance of need for psychiatric treatment, adequate self care, adequate sleep, appropriate interaction with peers    Progress Towards Goals: starting psychiatric medications as prescribed    Recommended Treatment: medication  management, patient medication education, group therapy, milieu therapy, continued Behavioral Health psychiatric evaluation/assessment process, medical follow up with medical team    Treatment Frequency: daily medication monitoring, group and milieu therapy daily, monitoring through interdisciplinary rounds, monitoring through weekly patient care conferences    Expected Discharge Date: 7 midnights     Discharge Plan: will be determined    Treatment Plan Created/Updated By: Renato Mcmanus MD
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
## 2024-08-08 NOTE — H&P
Leydi Khan  :1970 F  MRN:[id number]    CSN:[id number]  Adm Date: 2024  9:04 PM   ATT PHY: Renato Mcmanus Md  [facility]         Chief Complaint: depression, suicidal ideations      History of Presenting Illness: Leydi Khan is a(n) 54 y.o. year old female who is admitted to [facility] on voluntary 201 commitment basis.  Patient originally presented to [facility] ED on 24 for worsening depression with suicidal ideations.     Patient examined at bedside.  Patient denies any physical symptoms at this time.    Allergies   Allergen Reactions    Fish Oil - Food Allergy Other (See Comments)     unknown    Fish-Derived Products - Food Allergy Hives    Shellfish-Derived Products - Food Allergy Other (See Comments)           Current Facility-Administered Medications on File Prior to Encounter   Medication Dose Route Frequency Provider Last Rate Last Admin    [DISCONTINUED] atorvastatin (LIPITOR) tablet 10 mg  10 mg Oral Daily With Dinner Freddy Davila, DO   10 mg at 24    [DISCONTINUED] divalproex sodium (DEPAKOTE ER) 24 hr tablet 1,250 mg  1,250 mg Oral HS Freddy Davila, DO   1,250 mg at 24    [DISCONTINUED] furosemide (LASIX) tablet 20 mg  20 mg Oral Daily Freddy Davila, DO   20 mg at 24 0932    [DISCONTINUED] gabapentin (NEURONTIN) capsule 200 mg  200 mg Oral TID Freddy Davila, DO   200 mg at 24 180    [DISCONTINUED] lisinopril (ZESTRIL) tablet 20 mg  20 mg Oral Daily Freddy Germant, DO   20 mg at 24 0932    [DISCONTINUED] traZODone (DESYREL) tablet 50 mg  50 mg Oral HS Freddy Davila, DO   50 mg at 24     Current Outpatient Medications on File Prior to Encounter   Medication Sig Dispense Refill    divalproex sodium (DEPAKOTE ER) 500 mg 24 hr tablet Take 1,250 mg by mouth daily at bedtime      melatonin 3 mg Take 1 tablet (3 mg  total) by mouth daily at bedtime (Patient taking differently: Take 10 mg by mouth daily at bedtime) 30 tablet 0    acetaminophen (TYLENOL) 325 mg tablet Take 2 tablets (650 mg total) by mouth every 4 (four) hours as needed for mild pain 90 tablet 0    ammonium lactate (LAC-HYDRIN) 12 % lotion Apply topically 2 (two) times a day (Patient not taking: Reported on 8/8/2024) 225 g 0    atorvastatin (LIPITOR) 10 mg tablet Take 1 tablet (10 mg total) by mouth daily at bedtime (Patient taking differently: Take 10 mg by mouth daily at bedtime) 30 tablet 0    [START ON 9/21/2024] Cholecalciferol (VITAMIN D3) 1,000 units tablet Take 1 tablet (1,000 Units total) by mouth daily Do not start before September 21, 2024. (Patient not taking: Reported on 8/8/2024 Do not start before September 21, 2024.) 30 tablet 0    clonazePAM (KlonoPIN) 1 mg tablet Take 1 mg by mouth 2 (two) times a day      desvenlafaxine (PRISTIQ) 100 mg 24 hr tablet Take 125 mg by mouth daily      ergocalciferol (VITAMIN D2) 50,000 units Take 1 capsule (50,000 Units total) by mouth once a week for 12 doses Do not start before July 6, 2024. (Patient not taking: Reported on 8/8/2024) 12 capsule 0    FLUoxetine (PROzac) 20 mg capsule Take 20 mg by mouth daily      furosemide (LASIX) 20 mg tablet Take 1 tablet (20 mg total) by mouth daily (Patient taking differently: Take 20 mg by mouth daily) 30 tablet 0    gabapentin (NEURONTIN) 100 mg capsule Take 2 capsules (200 mg total) by mouth 3 (three) times a day (Patient taking differently: Take 100 mg by mouth 3 (three) times a day) 180 capsule 0    Incontinence Supply Disposable (Depend Adjustable Underwear) MISC Use as needed (as needed) Depends 3XL 1 each 3    lisinopril (ZESTRIL) 20 mg tablet Take 1 tablet (20 mg total) by mouth daily (Patient taking differently: Take 20 mg by mouth daily) 30 tablet 0    loratadine (CLARITIN) 10 mg tablet Take 1 tablet (10 mg total) by mouth daily (Patient taking differently: Take  10 mg by mouth as needed for allergies Take daily PRN) 30 tablet 0    meclizine (ANTIVERT) 12.5 MG tablet Take 1 tablet (12.5 mg total) by mouth every 8 (eight) hours as needed for dizziness (Patient taking differently: Take 25 mg by mouth daily) 30 tablet 0    nystatin (MYCOSTATIN) powder Apply topically 2 (two) times a day (Patient not taking: Reported on 8/8/2024) 30 g 0    pantoprazole (PROTONIX) 20 mg tablet Take 1 tablet (20 mg total) by mouth daily (Patient taking differently: Take 20 mg by mouth daily) 30 tablet 0    prazosin (MINIPRESS) 2 mg capsule Take 1 capsule (2 mg total) by mouth daily at bedtime 30 capsule 0    risperiDONE (RisperDAL) 3 mg tablet Take 1 tablet (3 mg total) by mouth 2 (two) times a day (Patient taking differently: Take 3 mg by mouth 2 (two) times a day) 60 tablet 0    senna-docusate sodium (SENOKOT S) 8.6-50 mg per tablet Take 1 tablet by mouth daily at bedtime (Patient not taking: Reported on 8/8/2024) 30 tablet 0    traZODone (DESYREL) 50 mg tablet Take 1 tablet (50 mg total) by mouth daily at bedtime (Patient not taking: Reported on 8/8/2024) 30 tablet 0     Active Ambulatory Problems     Diagnosis Date Noted    Primary hypertension 08/09/2016    Hyperlipidemia 08/09/2016    Class 3 severe obesity due to excess calories without serious comorbidity in adult ([facility]) 01/09/2021    Chronic midline low back pain without sciatica 01/09/2021    PTSD (post-traumatic stress disorder) 01/09/2021    Borderline personality disorder ([facility])     Seizures ([facility]) 04/27/2022    Anemia 11/03/2023    Schizoaffective disorder, depressive type ([facility]) 03/28/2024    Bilateral leg edema 03/28/2024     Resolved Ambulatory Problems     Diagnosis Date Noted    Medical clearance for psychiatric admission 01/09/2021    Major depression with psychotic features ([facility]) 11/29/2020    Bacterial conjunctivitis of both eyes 01/09/2021    Vertigo 01/22/2021    Bipolar affective disorder, depressed, severe, with psychotic  "behavior ([facility]) 10/10/2021    Closed fracture of base of fifth metatarsal bone 11/14/2021    Seizures ([facility]) 12/06/2021    Left leg pain 12/18/2021    Medical clearance for psychiatric admission 04/27/2022    Hyponatremia 04/27/2022    Insomnia 05/06/2022    Constipation 05/06/2022    History of non-suicidal self-harm 07/29/2022    Acute cystitis without hematuria 10/07/2022    MDD (major depressive disorder), recurrent, severe, with psychosis ([facility]) 02/13/2024    Major depressive disorder 07/04/2024     Past Medical History:   Diagnosis Date    Anxiety     Bipolar 1 disorder ([facility])     Bipolar affective disorder, depressed, severe ([facility]) 10/10/2021    CAD (coronary artery disease)     Cognitive impairment     Depression     Dyslipidemia     GERD (gastroesophageal reflux disease)     Hypertension     Obesity     Psychiatric disorder     Psychiatric illness     Schizoaffective disorder ([facility])     Seizure ([facility])      Past Surgical History:   Procedure Laterality Date    APPENDECTOMY      PATIENT DENIES HAVING APPENDIX REMOVED    CHOLECYSTECTOMY      FOOT SURGERY Right      Social History:   Social History     Socioeconomic History    Marital status: Single     Spouse name: Not on file    Number of children: Not on file    Years of education: Not on file    Highest education level: Not on file   Occupational History    Not on file   Tobacco Use    Smoking status: Former     Current packs/day: 0.25     Average packs/day: 0.3 packs/day for 0.5 years (0.1 ttl pk-yrs)     Types: Cigarettes, Cigars     Start date: 2/5/2024    Smokeless tobacco: Never    Tobacco comments:     Pt stated \"smokes when stressed\"   Vaping Use    Vaping status: Never Used   Substance and Sexual Activity    Alcohol use: Not Currently     Comment: occasionally    Drug use: Not Currently    Sexual activity: Not on file   Other Topics Concern    Not on file   Social History Narrative    Not on file     Social Determinants of Health     Financial Resource " Strain: Not on file   Food Insecurity: No Food Insecurity (6/21/2024)    Hunger Vital Sign     Worried About Running Out of Food in the Last Year: Never true     Ran Out of Food in the Last Year: Never true   Transportation Needs: No Transportation Needs (6/21/2024)    PRAPARE - Transportation     Lack of Transportation (Medical): No     Lack of Transportation (Non-Medical): No   Physical Activity: Not on file   Stress: Not on file   Social Connections: Socially Isolated (7/26/2021)    [facility]    Social Connection and Isolation Panel [NHANES]     Frequency of Communication with Friends and Family: More than three times a week     Frequency of Social Gatherings with Friends and Family: Never     Attends Zoroastrianism Services: Never     Active Member of Clubs or Organizations: No     Attends Club or Organization Meetings: Never     Marital Status: Never    Intimate Partner Violence: Not At Risk (6/21/2024)    Humiliation, Afraid, Rape, and Kick questionnaire     Fear of Current or Ex-Partner: No     Emotionally Abused: No     Physically Abused: No     Sexually Abused: No   Housing Stability: Low Risk  (6/21/2024)    Housing Stability Vital Sign     Unable to Pay for Housing in the Last Year: No     Number of Times Moved in the Last Year: 1     Homeless in the Last Year: No     Family History:   Family History   Problem Relation Age of Onset    Kidney cancer Mother     Cerebral aneurysm Father      Review of Systems   Constitutional:  Negative for chills and fever.   HENT:  Negative for ear pain and sore throat.    Eyes:  Negative for pain and visual disturbance.   Respiratory:  Negative for cough and shortness of breath.    Cardiovascular:  Negative for chest pain and palpitations.   Gastrointestinal:  Negative for abdominal pain and vomiting.   Genitourinary:  Negative for dysuria and hematuria.   Musculoskeletal:  Positive for arthralgias and back pain.  "  Skin:  Positive for rash. Negative for color change.   Neurological:  Positive for seizures. Negative for dizziness and headaches.   Psychiatric/Behavioral:  Positive for dysphoric mood and suicidal ideas.    All other systems reviewed and are negative.    Physical Exam   Vitals: Blood pressure 127/67, pulse 85, temperature 97.6 °F (36.4 °C), temperature source Temporal, resp. rate 20, height 5' 4\" (1.626 m), weight 136 kg (299 lb 6.4 oz), SpO2 97%.,Body mass index is 51.39 kg/m².  Constitutional: Awake, alert, in no acute distress.  Head: Normocephalic and atraumatic.   Mouth/Throat: Oropharynx is clear and moist.    Eyes: Conjunctivae and EOM are normal.   Neck: Neck supple.   Cardiovascular: Normal rate, regular rhythm and normal heart sounds.    Pulmonary/Chest: Effort normal and breath sounds normal.   Abdominal: Soft. Bowel sounds are normal. There is no tenderness.   Neurological: No focal deficits.   Skin: Skin is warm and dry.     Assessment     Leydi Khan is a(n) 54 y.o. year old female with MDD.     Cardiac with hx of HTN, HLD.  Continue lisinopril 20 mg daily, atorvastatin 10 mg daily, Lasix 20 mg daily.  Seizure disorder.  Patient is on Depakote DR 1250 mg at bedtime.  DJD/OA/chronic low back pain.  Tylenol, lidocaine patch daily and Voltaren gel.  GERD.  Patient is on Protonix 20 mg daily.  Prediabetes.  Hgb A1c 5.7% on 6/11/24.  Lifestyle modifications.  Neuropathy.  Continue gabapentin to 200 mg TID.  Vitamin D deficiency.  Not on supplement.  Recheck level.  Vitamin B12 deficiency.  Not on supplement.  Recheck level.   Intertrigo.  Involving undersurface of right breast, groin, sacrum.  Apply nystatin powder twice daily as needed.  Dry skin.  Bilateral feet.  Apply ammonium lactate twice daily.  MDD.  This is being managed by the psych team.       Prognosis: Fair.    Discharge Plan: In progress.    Advanced Directives: I have discussed in detail with the patient the advanced directives. The " patient does not have an appointed POA and does not have a living will.  Patient's emergency contact is her aunt, Sandhya Collins, whose number is [phone].  When discussing cardiac and pulmonary resuscitation efforts with the patient, the patient wishes to be full code.     I have spent more than 50 minutes gathering data, doing physical examination, and discussing the advanced directives, which was witnessed by caring staff.    The patient was discussed with Dr. Jarquin and he is in agreement with the above note.

## 2024-08-08 NOTE — NURSING NOTE
Patient appears to have slept through the overnight hours without issue.  No complaints voiced.  No acute behaviors observed. Patient remains in bed sleeping at this time.  MRSA swab specimen collected without issue. Continuous safety rounding in progress.

## 2024-08-09 LAB
ATRIAL RATE: 87 BPM
MRSA NOSE QL CULT: NORMAL
P AXIS: 37 DEGREES
PR INTERVAL: 176 MS
QRS AXIS: 64 DEGREES
QRSD INTERVAL: 84 MS
QT INTERVAL: 358 MS
QTC INTERVAL: 430 MS
T WAVE AXIS: 58 DEGREES
VENTRICULAR RATE: 87 BPM

## 2024-08-09 PROCEDURE — 93010 ELECTROCARDIOGRAM REPORT: CPT | Performed by: INTERNAL MEDICINE

## 2024-08-09 PROCEDURE — 99232 SBSQ HOSP IP/OBS MODERATE 35: CPT | Performed by: PSYCHIATRY & NEUROLOGY

## 2024-08-09 RX ORDER — GABAPENTIN 300 MG/1
300 CAPSULE ORAL 3 TIMES DAILY
Status: DISCONTINUED | OUTPATIENT
Start: 2024-08-09 | End: 2024-08-30 | Stop reason: HOSPADM

## 2024-08-09 RX ORDER — ERGOCALCIFEROL 1.25 MG/1
50000 CAPSULE, LIQUID FILLED ORAL WEEKLY
Status: DISCONTINUED | OUTPATIENT
Start: 2024-08-10 | End: 2024-08-30 | Stop reason: HOSPADM

## 2024-08-09 RX ADMIN — GABAPENTIN 300 MG: 300 CAPSULE ORAL at 20:59

## 2024-08-09 RX ADMIN — GLYCERIN 2 DROP: .002; .002; .01 SOLUTION/ DROPS OPHTHALMIC at 10:57

## 2024-08-09 RX ADMIN — FUROSEMIDE 20 MG: 20 TABLET ORAL at 08:22

## 2024-08-09 RX ADMIN — DIVALPROEX SODIUM 1250 MG: 500 TABLET, FILM COATED, EXTENDED RELEASE ORAL at 21:00

## 2024-08-09 RX ADMIN — ATORVASTATIN CALCIUM 10 MG: 10 TABLET, FILM COATED ORAL at 16:42

## 2024-08-09 RX ADMIN — GABAPENTIN 200 MG: 100 CAPSULE ORAL at 08:22

## 2024-08-09 RX ADMIN — LISINOPRIL 20 MG: 20 TABLET ORAL at 08:21

## 2024-08-09 RX ADMIN — TRAZODONE HYDROCHLORIDE 50 MG: 50 TABLET ORAL at 21:00

## 2024-08-09 RX ADMIN — OLANZAPINE 5 MG: 5 TABLET, FILM COATED ORAL at 17:21

## 2024-08-09 RX ADMIN — Medication: at 20:59

## 2024-08-09 RX ADMIN — GLYCERIN 2 DROP: .002; .002; .01 SOLUTION/ DROPS OPHTHALMIC at 16:42

## 2024-08-09 RX ADMIN — PRAZOSIN HYDROCHLORIDE 1 MG: 1 CAPSULE ORAL at 21:00

## 2024-08-09 RX ADMIN — PANTOPRAZOLE SODIUM 20 MG: 20 TABLET, DELAYED RELEASE ORAL at 06:11

## 2024-08-09 RX ADMIN — RISPERIDONE 2 MG: 2 TABLET, FILM COATED ORAL at 21:00

## 2024-08-09 NOTE — PLAN OF CARE
Problem: Ineffective Coping  Goal: Participates in unit activities  Description: Interventions:  - Provide therapeutic environment   - Provide required programming   - Redirect inappropriate behaviors   Outcome: Progressing     Problem: Risk for Self Injury/Neglect  Goal: Attend and participate in unit activities, including therapeutic, recreational, and educational groups  Description: Interventions:  - Provide therapeutic and educational activities daily, encourage attendance and participation, and document same in the medical record  - Obtain collateral information, encourage visitation and family involvement in care   Interventions:  - Provide therapeutic and educational activities daily, encourage attendance and participation, and document same in the medical record   Outcome: Progressing     Problem: Depression  Goal: Attend and participate in unit activities, including therapeutic, recreational, and educational groups  Description: Interventions:  - Provide therapeutic and educational activities daily, encourage attendance and participation, and document same in the medical record  - Obtain collateral information, encourage visitation and family involvement in care   Interventions:  - Provide therapeutic and educational activities daily, encourage attendance and participation, and document same in the medical record   Outcome: Progressing

## 2024-08-09 NOTE — SOCIAL WORK
CM placed call to [facility] . Spoke with  Isabella. Updated on PT status and plan of care. Pt plan was to go to [facility] funded [facility] however at ER pt refused and requested [facility]. Pt wants to go to EAC .  ROSE informed Lydia that our tx team is not rec EAC. Discussed pros and cons of pt going to an EAC vs implementation of an ACT team.   Lydia stated she would further discuss with her team.     ROSE placed call to PCP with [facility]  to notify of PT admission. Left message requesting return call.

## 2024-08-09 NOTE — PLAN OF CARE
Problem: DISCHARGE PLANNING  Goal: Discharge to home or other facility with appropriate resources  Description: INTERVENTIONS:  - Identify barriers to discharge w/patient and caregiver  - Arrange for needed discharge resources and transportation as appropriate  - Identify discharge learning needs (meds, wound care, etc.)  - Arrange for interpretive services to assist at discharge as needed  - Refer to Case Management Department for coordinating discharge planning if the patient needs post-hospital services based on physician/advanced practitioner order or complex needs related to functional status, cognitive ability, or social support system  Outcome: Progressing     Problem: Risk for Self Injury/Neglect  Goal: Treatment Goal: Remain safe during length of stay, learn and adopt new coping skills, and be free of self-injurious ideation, impulses and acts at the time of discharge  Outcome: Progressing  Goal: Verbalize thoughts and feelings  Description: Interventions:  - Assess and re-assess patient's lethality and potential for self-injury  - Engage patient in 1:1 interactions, daily, for a minimum of 15 minutes  - Encourage patient to express feelings, fears, frustrations, hopes  - Establish rapport/trust with patient   Interventions:  - Assess and re-assess patient's level of risk   - Engage patient in 1:1 interactions, daily, for a minimum of 15 minutes   - Encourage patient to express feelings, fears, frustrations, hopes   Outcome: Progressing  Goal: Attend and participate in unit activities, including therapeutic, recreational, and educational groups  Description: Interventions:  - Provide therapeutic and educational activities daily, encourage attendance and participation, and document same in the medical record  - Obtain collateral information, encourage visitation and family involvement in care   Interventions:  - Provide therapeutic and educational activities daily, encourage attendance and participation,  and document same in the medical record   Outcome: Progressing  Goal: Recognize maladaptive responses and adopt new coping mechanisms  Outcome: Progressing     Problem: Depression  Goal: Verbalize thoughts and feelings  Description: Interventions:  - Assess and re-assess patient's lethality and potential for self-injury  - Engage patient in 1:1 interactions, daily, for a minimum of 15 minutes  - Encourage patient to express feelings, fears, frustrations, hopes  - Establish rapport/trust with patient   Interventions:  - Assess and re-assess patient's level of risk   - Engage patient in 1:1 interactions, daily, for a minimum of 15 minutes   - Encourage patient to express feelings, fears, frustrations, hopes   Outcome: Progressing  Goal: Attend and participate in unit activities, including therapeutic, recreational, and educational groups  Description: Interventions:  - Provide therapeutic and educational activities daily, encourage attendance and participation, and document same in the medical record  - Obtain collateral information, encourage visitation and family involvement in care   Interventions:  - Provide therapeutic and educational activities daily, encourage attendance and participation, and document same in the medical record   Outcome: Progressing  Goal: Treatment Goal: Demonstrate behavioral control of depressive symptoms, verbalize feelings of improved mood/affect, and adopt new coping skills prior to discharge  Outcome: Progressing  Goal: Refrain from harming self  Description: Interventions:  - Monitor patient closely, per order   - Supervise medication ingestion, monitor effects and side effects   Outcome: Progressing  Goal: Refrain from isolation  Description: Interventions:  - Develop a trusting relationship   - Encourage socialization   Outcome: Progressing  Goal: Refrain from self-neglect  Outcome: Progressing  Goal: Complete daily ADLs, including personal hygiene independently, as  able  Description: Interventions:  - Observe, teach, and assist patient with ADLS  -  Monitor and promote a balance of rest/activity, with adequate nutrition and elimination   Outcome: Progressing     Problem: Anxiety  Goal: Anxiety is at manageable level  Description: Interventions:  - Assess and monitor patient's anxiety level.   - Monitor for signs and symptoms (heart palpitations, chest pain, shortness of breath, headaches, nausea, feeling jumpy, restlessness, irritable, apprehensive).   - Collaborate with interdisciplinary team and initiate plan and interventions as ordered.  - [place] patient to unit/surroundings  - Explain treatment plan  - Encourage participation in care  - Encourage verbalization of concerns/fears  - Identify coping mechanisms  - Assist in developing anxiety-reducing skills  - Administer/offer alternative therapies  - Limit or eliminate stimulants  Outcome: Progressing

## 2024-08-09 NOTE — NURSING NOTE
"Patient visible in milieu, mood and affect labile. Periods of tearfulness noted due to not wanting to go back to group home, support provided. Patient endorses moderate anxiety, high depression. Per patient, \"I want to go to EAC\". Patient denies HI, hallucinations. Patient with passive SI, no plan identified. Patient remains visible in milieu. PRN Zyprexa given for moderate agitation, score of 32 with minimal effect. Patient remains medication compliant and on 7\" checks for safety and behaviors.  "

## 2024-08-09 NOTE — PROGRESS NOTES
Progress Note - Behavioral Health     Leydi Khan 54 y.o. female MRN: [id number]   Unit/Bed#: [facility] 209-02 Encounter: [id number]    Behavior over the last 24 hours: minimal improvement.     Leydi was seen today, appears slightly less anxious and restless but continues verbalizing vague suicidal ideation with no specific plan. States she is willing to go to [facility] and adjusted fairly well to the unit. Denies any active plan to hurt herself. She perseverates about not going back to [facility]. She has been compliant with medication and denied any current side effects. Staff report some participation in groups and on the unit.    Sleep: slept better  Appetite: fair  Medication side effects: denies  ROS: no complaints, all other systems are negative    Mental Status Evaluation:    Appearance:  age appropriate, overweight   Behavior:  more cooperative   Speech:  normal rate and volume   Mood:  depressed, anxious   Affect:  mood-congruent   Thought Process:  goal directed, concrete   Associations: concrete associations   Thought Content:  some paranoia   Perceptual Disturbances: denies auditory hallucinations when asked   Risk Potential: Suicidal ideation - Yes, passive death wish, would talk to staff if not feeling safe on the unit  Homicidal ideation - None   Sensorium:  oriented to person, place, and time/date   Memory:  recent and remote memory grossly intact   Consciousness:  alert and awake   Attention: attention span and concentration are age appropriate   Insight:  limited   Judgment: limited   Gait/Station: normal gait/station   Motor Activity: no abnormal movements     Vital signs in last 24 hours:    Temp:  [97.6 °F (36.4 °C)-97.9 °F (36.6 °C)] 97.6 °F (36.4 °C)  HR:  [82-87] 87  Resp:  [18-19] 19  BP: (113-126)/(57-62) 126/62    Laboratory results: I have personally reviewed all pertinent laboratory/tests results.  Most Recent Labs:   Lab Results   Component Value Date    WBC 6.93 08/07/2024    RBC 3.77 (L)  08/07/2024    HGB 12.0 08/07/2024    HCT 35.9 08/07/2024     08/07/2024    RDW 14.6 08/07/2024    TOTANEUTABS 5.06 08/07/2024    NEUTROABS 4.34 04/20/2024    SODIUM 138 08/07/2024    K 4.4 08/07/2024    CL 98 08/07/2024    CO2 33 (H) 08/07/2024    BUN 10 08/07/2024    CREATININE 0.47 (L) 08/07/2024    GLUC 140 08/07/2024    GLUF 114 (H) 06/21/2024    CALCIUM 9.3 08/07/2024    AST 11 (L) 08/07/2024    ALT 17 08/07/2024    ALKPHOS 60 08/07/2024    TP 7.0 08/07/2024    ALB 3.9 08/07/2024    TBILI 0.49 08/07/2024    CHOLESTEROL 168 06/21/2024    HDL 32 (L) 06/21/2024    TRIG 156 (H) 06/21/2024    LDLCALC 105 (H) 06/21/2024    NONHDLC 136 06/21/2024    VALPROICTOT 51 07/04/2024    [id number] 2.179 08/07/2024    FREET4 0.65 06/11/2024    PREGUR negative 11/06/2022    PREGSERUM Negative 04/28/2022    RPR Non-Reactive 12/13/2022    HGBA1C 5.7 (H) 06/11/2024     06/11/2024       Assessment & Plan   Principal Problem:    Schizoaffective disorder, depressive type (HCC)  Active Problems:    Primary hypertension    Hyperlipidemia    Class 3 severe obesity due to excess calories without serious comorbidity in adult (HCC)    PTSD (post-traumatic stress disorder)    Borderline personality disorder (HCC)    Bilateral leg edema    Recommended Treatment:     Planned medication and treatment changes:    All current active medications have been reviewed  Encourage group therapy, milieu therapy and occupational therapy  Behavioral Health checks every 7 minutes  Increase Neurontin to 300 mg po tid    Current Facility-Administered Medications   Medication Dose Route Frequency Provider Last Rate    acetaminophen  650 mg Oral Q4H PRN SHANI Hawk      acetaminophen  650 mg Oral Q4H PRN Aditya Gabrielle-Anom, CRNP      acetaminophen  975 mg Oral Q6H PRN Aditya Anthony-Anom, CRNP      ammonium lactate   Topical BID Saraheddi Mckay PA-C      Artificial Tears  2 drop Both Eyes BID Saraheddi Mckay PA-C       atorvastatin  10 mg Oral Daily With Dinner [facility], CRNP      benztropine  0.5 mg Oral Q4H PRN Max 6/day [facility], CRNP      [START ON 9/29/2024] Cholecalciferol  1,000 Units Oral Daily Sarah Mckay PA-C      divalproex sodium  1,250 mg Oral HS [facility], CRNP      [START ON 8/10/2024] ergocalciferol  50,000 Units Oral Weekly Sarah Mckay PA-C      furosemide  20 mg Oral Daily [facility], CRNP      gabapentin  200 mg Oral TID [facility], CRNP      hydrOXYzine HCL  25 mg Oral Q6H PRN Max 4/day [facility], CRNP      lisinopril  20 mg Oral Daily [facility], CRNP      LORazepam  1 mg Intramuscular Q6H PRN Max 3/day [facility], CRNP      LORazepam  0.5 mg Oral Q6H PRN Max 4/day [facility], CRNP      LORazepam  1 mg Oral Q6H PRN Max 3/day [facility], CRNP      nystatin   Topical BID PRN Sarah Mckay PA-C      OLANZapine  5 mg Intramuscular Q3H PRN Max 3/day [facility], CRNP      OLANZapine  2.5 mg Oral Q4H PRN Max 6/day [facility], CRNP      OLANZapine  5 mg Oral Q4H PRN Max 3/day [facility], CRNP      OLANZapine  5 mg Oral Q3H PRN Max 3/day [facility], CRNP      pantoprazole  20 mg Oral Early Morning Sarah Mckay PA-C      prazosin  1 mg Oral HS Renato Mcmanus MD      risperiDONE  2 mg Oral HS Renato Mcmanus MD      traZODone  50 mg Oral HS [facility], CRNP         Risks / Benefits of Treatment:    Risks, benefits, and possible side effects of medications explained to patient and patient verbalizes understanding and agreement for treatment.    Counseling / Coordination of Care:    Patient's progress discussed with staff in treatment team meeting.  Medications, treatment progress and treatment plan reviewed with patient.  Supportive therapy provided to patient.  Group attendance encouraged.    Renato Mcmanus MD 08/09/24

## 2024-08-09 NOTE — PROGRESS NOTES
08/09/24    Team Meeting   Meeting Type Daily Rounds   Team Members Present   Team Members Present Physician;Nurse;;Occupational Therapist   Physician Team Member Marietta MCLEOD   Nursing Team Member Miguel VALDEZ   Social Work Team Member Justice DE LA TORRE   OT Team Member Murphy CTRS   Patient/Family Present   Patient Present No   Patient's Family Present No   return to [facility], papers, crayons, complaint with meds, endorses si/anx/dep, denies hi

## 2024-08-09 NOTE — PROGRESS NOTES
"Progress Note - Leydi Khan 54 y.o. female MRN: [id number]    Unit/Bed#: [facility] 209-02 Encounter: [id number]        Subjective:   Patient seen and examined at bedside after reviewing the chart and discussing the case with the caring staff.      Patient examined at bedside.  Patient complaining of dry eyes and requesting eye drops.    Physical Exam   Vitals: Blood pressure 126/62, pulse 87, temperature 97.6 °F (36.4 °C), temperature source Temporal, resp. rate 19, height 5' 4\" (1.626 m), weight 136 kg (299 lb 6.4 oz), SpO2 96%.,Body mass index is 51.39 kg/m².  Constitutional: Patient in no acute distress.  HEENT: PERR, EOMI, MMM.  Cardiovascular: Normal rate and regular rhythm.    Pulmonary/Chest: Effort normal and breath sounds normal.   Abdomen: Soft, + BS, NT.    Assessment/Plan:  Leydi Khan is a(n) 54 y.o. year old female with MDD.     Cardiac with hx of HTN, HLD.  Continue lisinopril 20 mg daily, atorvastatin 10 mg daily, Lasix 20 mg daily.  Seizure disorder.  Patient is on Depakote DR 1250 mg at bedtime.  DJD/OA/chronic low back pain.  Tylenol, lidocaine patch daily and Voltaren gel.  GERD.  Patient is on Protonix 20 mg daily.  Prediabetes.  Hgb A1c 5.7% on 6/11/24.  Lifestyle modifications.  Neuropathy.  Continue gabapentin to 200 mg TID.  Vitamin D deficiency.  Patient started on vitamin D2 02428 units weekly x 8 weeks followed by vitamin D3 1000 units daily.   Vitamin B12 deficiency.  Not on supplement.  Intertrigo.  Involving undersurface of right breast, groin, sacrum.  Apply nystatin powder twice daily as needed.  Dry skin.  Bilateral feet.  Apply ammonium lactate twice daily.  Dry eyes.  Started on artificial tear drops twice daily 8/9/24.    The patient was discussed with Dr. Jarquin and he is in agreement with the above note.  "

## 2024-08-09 NOTE — NURSING NOTE
Patient social and out in the community (mostly sitting in chair near nursing station.   Rated depression and anxiety as high.  Admitted to vague suicidal thoughts.  Allow to stay at nursing station till she felt safe. Smith Scale 24 at 2103. Requested and received Ativan 0.5 mg at this time. Reassessed at 2202. Smith Scale 11 at this time. Stated she felt slightly better. Retired to bed at 2200, feeling safe on unit. Pleasant during assessment. Compliant with medications and snacks. Medication education given. Care Plan reviewed and amended. Maintained on q 7 minute checks.  Will continue to monitor.

## 2024-08-10 PROCEDURE — 99232 SBSQ HOSP IP/OBS MODERATE 35: CPT

## 2024-08-10 RX ADMIN — LISINOPRIL 20 MG: 20 TABLET ORAL at 08:58

## 2024-08-10 RX ADMIN — PANTOPRAZOLE SODIUM 20 MG: 20 TABLET, DELAYED RELEASE ORAL at 06:18

## 2024-08-10 RX ADMIN — GABAPENTIN 300 MG: 300 CAPSULE ORAL at 21:31

## 2024-08-10 RX ADMIN — ERGOCALCIFEROL 50000 UNITS: 1.25 CAPSULE, LIQUID FILLED ORAL at 08:58

## 2024-08-10 RX ADMIN — ATORVASTATIN CALCIUM 10 MG: 10 TABLET, FILM COATED ORAL at 15:24

## 2024-08-10 RX ADMIN — Medication: at 08:59

## 2024-08-10 RX ADMIN — GLYCERIN 2 DROP: .002; .002; .01 SOLUTION/ DROPS OPHTHALMIC at 19:01

## 2024-08-10 RX ADMIN — PRAZOSIN HYDROCHLORIDE 1 MG: 1 CAPSULE ORAL at 21:30

## 2024-08-10 RX ADMIN — FUROSEMIDE 20 MG: 20 TABLET ORAL at 08:58

## 2024-08-10 RX ADMIN — GABAPENTIN 300 MG: 300 CAPSULE ORAL at 15:24

## 2024-08-10 RX ADMIN — DIVALPROEX SODIUM 1250 MG: 500 TABLET, FILM COATED, EXTENDED RELEASE ORAL at 21:30

## 2024-08-10 RX ADMIN — GLYCERIN 2 DROP: .002; .002; .01 SOLUTION/ DROPS OPHTHALMIC at 08:59

## 2024-08-10 RX ADMIN — RISPERIDONE 2 MG: 2 TABLET, FILM COATED ORAL at 21:31

## 2024-08-10 RX ADMIN — GABAPENTIN 300 MG: 300 CAPSULE ORAL at 08:58

## 2024-08-10 RX ADMIN — TRAZODONE HYDROCHLORIDE 50 MG: 50 TABLET ORAL at 21:31

## 2024-08-10 NOTE — PLAN OF CARE
Problem: Risk for Self Injury/Neglect  Goal: Treatment Goal: Remain safe during length of stay, learn and adopt new coping skills, and be free of self-injurious ideation, impulses and acts at the time of discharge  Outcome: Progressing  Goal: Verbalize thoughts and feelings  Description: Interventions:  - Assess and re-assess patient's lethality and potential for self-injury  - Engage patient in 1:1 interactions, daily, for a minimum of 15 minutes  - Encourage patient to express feelings, fears, frustrations, hopes  - Establish rapport/trust with patient   Interventions:  - Assess and re-assess patient's level of risk   - Engage patient in 1:1 interactions, daily, for a minimum of 15 minutes   - Encourage patient to express feelings, fears, frustrations, hopes   Outcome: Progressing  Goal: Attend and participate in unit activities, including therapeutic, recreational, and educational groups  Description: Interventions:  - Provide therapeutic and educational activities daily, encourage attendance and participation, and document same in the medical record  - Obtain collateral information, encourage visitation and family involvement in care   Interventions:  - Provide therapeutic and educational activities daily, encourage attendance and participation, and document same in the medical record   Outcome: Progressing  Goal: Recognize maladaptive responses and adopt new coping mechanisms  Outcome: Progressing     Problem: Depression  Goal: Verbalize thoughts and feelings  Description: Interventions:  - Assess and re-assess patient's lethality and potential for self-injury  - Engage patient in 1:1 interactions, daily, for a minimum of 15 minutes  - Encourage patient to express feelings, fears, frustrations, hopes  - Establish rapport/trust with patient   Interventions:  - Assess and re-assess patient's level of risk   - Engage patient in 1:1 interactions, daily, for a minimum of 15 minutes   - Encourage patient to express  feelings, fears, frustrations, hopes   Outcome: Progressing  Goal: Attend and participate in unit activities, including therapeutic, recreational, and educational groups  Description: Interventions:  - Provide therapeutic and educational activities daily, encourage attendance and participation, and document same in the medical record  - Obtain collateral information, encourage visitation and family involvement in care   Interventions:  - Provide therapeutic and educational activities daily, encourage attendance and participation, and document same in the medical record   Outcome: Progressing  Goal: Treatment Goal: Demonstrate behavioral control of depressive symptoms, verbalize feelings of improved mood/affect, and adopt new coping skills prior to discharge  Outcome: Progressing  Goal: Refrain from harming self  Description: Interventions:  - Monitor patient closely, per order   - Supervise medication ingestion, monitor effects and side effects   Outcome: Progressing  Goal: Refrain from isolation  Description: Interventions:  - Develop a trusting relationship   - Encourage socialization   Outcome: Progressing  Goal: Refrain from self-neglect  Outcome: Progressing  Goal: Complete daily ADLs, including personal hygiene independently, as able  Description: Interventions:  - Observe, teach, and assist patient with ADLS  -  Monitor and promote a balance of rest/activity, with adequate nutrition and elimination   Outcome: Progressing     Problem: Anxiety  Goal: Anxiety is at manageable level  Description: Interventions:  - Assess and monitor patient's anxiety level.   - Monitor for signs and symptoms (heart palpitations, chest pain, shortness of breath, headaches, nausea, feeling jumpy, restlessness, irritable, apprehensive).   - Collaborate with interdisciplinary team and initiate plan and interventions as ordered.  - [place] patient to unit/surroundings  - Explain treatment plan  - Encourage participation in care  -  Encourage verbalization of concerns/fears  - Identify coping mechanisms  - Assist in developing anxiety-reducing skills  - Administer/offer alternative therapies  - Limit or eliminate stimulants  Outcome: Progressing

## 2024-08-10 NOTE — PROGRESS NOTES
"Progress Note - Leydi Khan 54 y.o. female MRN: [id number]    Unit/Bed#: [facility] 209-02 Encounter: [id number]        Subjective:   Patient seen and examined at bedside after reviewing the chart and discussing the case with the caring staff.      Patient examined at bedside.  Patient has no acute symptoms.     Physical Exam   Vitals: Blood pressure 114/58, pulse 90, temperature 97.9 °F (36.6 °C), temperature source Temporal, resp. rate 18, height 5' 4\" (1.626 m), weight 136 kg (299 lb 6.4 oz), SpO2 91%.,Body mass index is 51.39 kg/m².  Constitutional: Patient in no acute distress.  HEENT: PERR, EOMI, MMM.  Cardiovascular: Normal rate and regular rhythm.    Pulmonary/Chest: Effort normal and breath sounds normal.   Abdomen: Soft, + BS, NT.    Assessment/Plan:  Leydi Khan is a(n) 54 y.o. year old female with MDD.     Cardiac with hx of HTN, HLD.  Continue lisinopril 20 mg daily, atorvastatin 10 mg daily, Lasix 20 mg daily.  Seizure disorder.  Patient is on Depakote DR 1250 mg at bedtime.  DJD/OA/chronic low back pain.  Tylenol, lidocaine patch daily and Voltaren gel.  GERD.  Patient is on Protonix 20 mg daily.  Prediabetes.  Hgb A1c 5.7% on 6/11/24.  Lifestyle modifications.  Neuropathy.  Continue gabapentin to 200 mg TID.  Vitamin D deficiency.  Patient started on vitamin D2 46358 units weekly x 8 weeks followed by vitamin D3 1000 units daily.   Vitamin B12 deficiency.  Not on supplement.  Intertrigo.  Involving undersurface of right breast, groin, sacrum.  Apply nystatin powder twice daily as needed.  Dry skin.  Bilateral feet.  Apply ammonium lactate twice daily.  Dry eyes.  Started on artificial tear drops twice daily 8/9/24.    The patient was discussed with Dr. Jarquin and he is in agreement with the above note.  "

## 2024-08-10 NOTE — NURSING NOTE
Patient reports mild depression and moderate anxiety today. She denies suicidal thoughts or thoughts of self harm at time of assessment. She is spending time in the community and is socializing with peers. She states she was feeling positive after speaking to her boyfriend on the phone. No complaints of pain. Appetite is good. Compliant with medications.

## 2024-08-10 NOTE — PROGRESS NOTES
Progress Note - Behavioral Health   Leydi Khan 54 y.o. female MRN: [id number]  Unit/Bed#: [facility] 209-02 Encounter: [id number]    Assessment & Plan   Principal Problem:    Schizoaffective disorder, depressive type (HCC)  Active Problems:    Primary hypertension    Hyperlipidemia    Class 3 severe obesity due to excess calories without serious comorbidity in adult (HCC)    PTSD (post-traumatic stress disorder)    Borderline personality disorder (HCC)    Bilateral leg edema      Subjective:Patient was seen today for continuation of care, records reviewed and  patient was discussed with the morning case review team. Patient received prn po Zyprexa last evening for moderate agitation with effect, no further behaviors. She is cooperative, pleasant on approach, focused on ECT, afraid of having to return to group home. Endorses chronic passive SI, contracts for safety on unit. Moderate depression and anxiety. Reports sleeping and eating well. Patient denies endorsing any suicidal or homicidal ideation. Denies AH, VH. Remains medication compliance. Denies any side effects from medications. Valproic acid within normal limits.     Psychiatric Review of Systems:    Sleep: difficulty falling asleep  Appetite: fair  Medication side effects: No   ROS: all other systems are negative    Vitals:  Vitals:    08/10/24 0754   BP: 114/58   Pulse: 90   Resp: 18   Temp: 97.9 °F (36.6 °C)   SpO2: 91%       Mental Status Evaluation:    Appearance:  overweight   Behavior:  cooperative   Speech:  normal rate and volume   Mood:  depressed   Affect:  constricted   Thought Process:  concrete   Associations: concrete associations   Thought Content:  mild paranoia   Perceptual Disturbances: denies auditory hallucinations when asked   Risk Potential: Suicidal ideation - Yes, passive death wish contracts for safety on unit  Homicidal ideation - None at present  Potential for aggression - No   Sensorium:  oriented to person, place, and time/date    Memory:  recent and remote memory grossly intact   Consciousness:  alert and awake   Attention: attention span and concentration appear shorter than expected for age   Insight:  limited   Judgment: limited   Gait/Station: in bed   Motor Activity: no abnormal movements     Laboratory results:  I have personally reviewed all pertinent laboratory/tests results.  Most Recent Labs:   Lab Results   Component Value Date    WBC 6.93 08/07/2024    RBC 3.77 (L) 08/07/2024    HGB 12.0 08/07/2024    HCT 35.9 08/07/2024     08/07/2024    RDW 14.6 08/07/2024    TOTANEUTABS 5.06 08/07/2024    NEUTROABS 4.34 04/20/2024    SODIUM 138 08/07/2024    K 4.4 08/07/2024    CL 98 08/07/2024    CO2 33 (H) 08/07/2024    BUN 10 08/07/2024    CREATININE 0.47 (L) 08/07/2024    GLUC 140 08/07/2024    GLUF 114 (H) 06/21/2024    CALCIUM 9.3 08/07/2024    AST 11 (L) 08/07/2024    ALT 17 08/07/2024    ALKPHOS 60 08/07/2024    TP 7.0 08/07/2024    ALB 3.9 08/07/2024    TBILI 0.49 08/07/2024    CHOLESTEROL 168 06/21/2024    HDL 32 (L) 06/21/2024    TRIG 156 (H) 06/21/2024    LDLCALC 105 (H) 06/21/2024    NONHDLC 136 06/21/2024    VALPROICTOT 51 07/04/2024    [id number] 2.179 08/07/2024    FREET4 0.65 06/11/2024    PREGUR negative 11/06/2022    PREGSERUM Negative 04/28/2022    RPR Non-Reactive 12/13/2022    HGBA1C 5.7 (H) 06/11/2024     06/11/2024     Depakote:   Lab Results   Component Value Date    VALPROICTOT 51 07/04/2024           Recommended Treatment:     -Continue current medications      All current active medications have been reviewed  Encourage group therapy, milieu therapy and occupational therapy  Continue treatment with group therapy, milieu therapy and occupational therapy  Behavioral Health checks every 7 minutes  Continue current medications:  Current Facility-Administered Medications   Medication Dose Route Frequency Provider Last Rate    acetaminophen  650 mg Oral Q4H PRN SHANI Hawk   650 mg Oral Q4H PRN [facility], CRNP      acetaminophen  975 mg Oral Q6H PRN [facility], CRNP      ammonium lactate   Topical BID Sarah Mckay PA-C      Artificial Tears  2 drop Both Eyes BID Sarah L JO Mckay      atorvastatin  10 mg Oral Daily With Dinner [facility], CRNP      benztropine  0.5 mg Oral Q4H PRN Max 6/day [facility], CRNP      [START ON 9/29/2024] Cholecalciferol  1,000 Units Oral Daily Sarah LETTY Mckay PA-C      divalproex sodium  1,250 mg Oral HS [facility], CRNP      ergocalciferol  50,000 Units Oral Weekly Sarah Mckay PA-C      furosemide  20 mg Oral Daily [facility], CRNP      gabapentin  300 mg Oral TID Renato Mcmanus MD      hydrOXYzine HCL  25 mg Oral Q6H PRN Max 4/day [facility], CRNP      lisinopril  20 mg Oral Daily [facility], CRNP      LORazepam  1 mg Intramuscular Q6H PRN Max 3/day [facility], CRNP      LORazepam  0.5 mg Oral Q6H PRN Max 4/day [facility], CRNP      LORazepam  1 mg Oral Q6H PRN Max 3/day [facility], CRNP      nystatin   Topical BID PRN Sarah Mckay PA-C      OLANZapine  5 mg Intramuscular Q3H PRN Max 3/day [facility], CRNP      OLANZapine  2.5 mg Oral Q4H PRN Max 6/day [facility], CRNP      OLANZapine  5 mg Oral Q4H PRN Max 3/day [facility], CRNP      OLANZapine  5 mg Oral Q3H PRN Max 3/day [facility], CRNP      pantoprazole  20 mg Oral Early Morning Sarah LETTY Mckay PA-C      prazosin  1 mg Oral HS Renato Mcmanus MD      risperiDONE  2 mg Oral HS Renato Mcmanus MD      traZODone  50 mg Oral HS [facility], CRNP         Risks / Benefits of Treatment:     Risks, benefits, and possible side effects of medications explained to patient. Patient does not verbalize understanding of benefits or risks of treatment refusal at this time and needs ongoing explanation of treatment benefits and treatment plan.    Counseling /  Coordination of Care:     Patient's progress reviewed with nursing staff.  Medications, treatment progress and treatment plan reviewed with patient.  Supportive counseling provided to the patient.          SHANI Colbert

## 2024-08-10 NOTE — NURSING NOTE
Patient had some difficulty falling asleep and staying asleep. No signs or symptoms of distress. Q 7 minute safety checks maintained.

## 2024-08-10 NOTE — NURSING NOTE
Patient visible on the unit. Tearful at times. Participated in snack. Compliant with medications. Currently denies Si, HI, hallucinations, anxiety, and depression. Continuous rounding maintained.

## 2024-08-11 PROCEDURE — 99232 SBSQ HOSP IP/OBS MODERATE 35: CPT

## 2024-08-11 RX ADMIN — Medication: at 08:54

## 2024-08-11 RX ADMIN — GABAPENTIN 300 MG: 300 CAPSULE ORAL at 08:54

## 2024-08-11 RX ADMIN — ATORVASTATIN CALCIUM 10 MG: 10 TABLET, FILM COATED ORAL at 16:36

## 2024-08-11 RX ADMIN — DIVALPROEX SODIUM 1250 MG: 500 TABLET, FILM COATED, EXTENDED RELEASE ORAL at 20:51

## 2024-08-11 RX ADMIN — LISINOPRIL 20 MG: 20 TABLET ORAL at 08:54

## 2024-08-11 RX ADMIN — GABAPENTIN 300 MG: 300 CAPSULE ORAL at 16:36

## 2024-08-11 RX ADMIN — FUROSEMIDE 20 MG: 20 TABLET ORAL at 08:54

## 2024-08-11 RX ADMIN — RISPERIDONE 2 MG: 2 TABLET, FILM COATED ORAL at 20:53

## 2024-08-11 RX ADMIN — GABAPENTIN 300 MG: 300 CAPSULE ORAL at 20:52

## 2024-08-11 RX ADMIN — PRAZOSIN HYDROCHLORIDE 1 MG: 1 CAPSULE ORAL at 20:52

## 2024-08-11 RX ADMIN — PANTOPRAZOLE SODIUM 20 MG: 20 TABLET, DELAYED RELEASE ORAL at 06:09

## 2024-08-11 RX ADMIN — TRAZODONE HYDROCHLORIDE 50 MG: 50 TABLET ORAL at 20:53

## 2024-08-11 RX ADMIN — GLYCERIN 2 DROP: .002; .002; .01 SOLUTION/ DROPS OPHTHALMIC at 08:55

## 2024-08-11 NOTE — PROGRESS NOTES
Progress Note - Behavioral Health   Leydi Khan 54 y.o. female MRN: [id number]  Unit/Bed#: [facility] 209-02 Encounter: [id number]    Assessment & Plan   Principal Problem:    Schizoaffective disorder, depressive type (HCC)  Active Problems:    Primary hypertension    Hyperlipidemia    Class 3 severe obesity due to excess calories without serious comorbidity in adult (HCC)    PTSD (post-traumatic stress disorder)    Borderline personality disorder (HCC)    Bilateral leg edema      Subjective:Patient was seen today for continuation of care, records reviewed and  patient was discussed with the morning case review team.  No change in patient's behaviors in the past 24 hours, slept well with no issues.  Mood remains labile when patient's needs are not met immediately, no overt aggression or agitation, no PRNs required.  Patient is looking forward to GRISELDA Sarmiento when talking about her supportive boyfriend.  Endorses moderate depression and anxiety, denies auditory or visual hallucinations.  She remains compliant with medication regimen valproic acid within normal limits.  Denies suicidal or homicidal ideation.        Psychiatric Review of Systems:    Sleep: improved  Appetite: fair  Medication side effects: No   ROS: all other systems are negative    Vitals:  Vitals:    08/11/24 0748   BP: 122/59   Pulse: 83   Resp: 18   Temp: 97.5 °F (36.4 °C)   SpO2: 94%       Mental Status Evaluation:    Appearance:  overweight   Behavior:  cooperative, agitated   Speech:  normal rate and volume   Mood:  depressed, labile   Affect:  constricted   Thought Process:  concrete   Associations: concrete associations   Thought Content:  mild paranoia   Perceptual Disturbances: denies auditory hallucinations when asked   Risk Potential: Suicidal ideation - Yes, passive death wish contracts for safety on unit  Homicidal ideation - None at present  Potential for aggression - No   Sensorium:  oriented to person, place, and time/date   Memory:   recent and remote memory grossly intact   Consciousness:  alert and awake   Attention: attention span and concentration appear shorter than expected for age   Insight:  limited   Judgment: limited   Gait/Station: in bed   Motor Activity: no abnormal movements     Laboratory results:  I have personally reviewed all pertinent laboratory/tests results.  Most Recent Labs:   Lab Results   Component Value Date    WBC 6.93 08/07/2024    RBC 3.77 (L) 08/07/2024    HGB 12.0 08/07/2024    HCT 35.9 08/07/2024     08/07/2024    RDW 14.6 08/07/2024    TOTANEUTABS 5.06 08/07/2024    NEUTROABS 4.34 04/20/2024    SODIUM 138 08/07/2024    K 4.4 08/07/2024    CL 98 08/07/2024    CO2 33 (H) 08/07/2024    BUN 10 08/07/2024    CREATININE 0.47 (L) 08/07/2024    GLUC 140 08/07/2024    GLUF 114 (H) 06/21/2024    CALCIUM 9.3 08/07/2024    AST 11 (L) 08/07/2024    ALT 17 08/07/2024    ALKPHOS 60 08/07/2024    TP 7.0 08/07/2024    ALB 3.9 08/07/2024    TBILI 0.49 08/07/2024    CHOLESTEROL 168 06/21/2024    HDL 32 (L) 06/21/2024    TRIG 156 (H) 06/21/2024    LDLCALC 105 (H) 06/21/2024    NONHDLC 136 06/21/2024    VALPROICTOT 51 07/04/2024    EBK8MVICNYKC 2.179 08/07/2024    FREET4 0.65 06/11/2024    PREGUR negative 11/06/2022    PREGSERUM Negative 04/28/2022    RPR Non-Reactive 12/13/2022    HGBA1C 5.7 (H) 06/11/2024     06/11/2024     Depakote:   Lab Results   Component Value Date    VALPROICTOT 51 07/04/2024           Recommended Treatment:     -Continue current medications      All current active medications have been reviewed  Encourage group therapy, milieu therapy and occupational therapy  Continue treatment with group therapy, milieu therapy and occupational therapy  Behavioral Health checks every 7 minutes  Continue current medications:  Current Facility-Administered Medications   Medication Dose Route Frequency Provider Last Rate    acetaminophen  650 mg Oral Q4H PRN SHANI Hawk      acetaminophen  650 mg  Oral Q4H PRN [facility], CRNP      acetaminophen  975 mg Oral Q6H PRN Aditya Gabrielle-[facility], CRNP      ammonium lactate   Topical BID Sarah Mckay PA-C      Artificial Tears  2 drop Both Eyes BID Sarah L JO Mckay      atorvastatin  10 mg Oral Daily With Dinner [facility]      benztropine  0.5 mg Oral Q4H PRN Max 6/day [facility], CRNP      [START ON 9/29/2024] Cholecalciferol  1,000 Units Oral Daily Sarah L JO Mckay      divalproex sodium  1,250 mg Oral HS [facility], CRNP      ergocalciferol  50,000 Units Oral Weekly Sarah LETTY Mckay PA-C      furosemide  20 mg Oral Daily [facility], CR[facility]      gabapentin  300 mg Oral TID Renato Mcmanus MD      hydrOXYzine HCL  25 mg Oral Q6H PRN Max 4/day [facility], CRNP      lisinopril  20 mg Oral Daily [facility], CRNP      LORazepam  1 mg Intramuscular Q6H PRN Max 3/day [facility], CRNP      LORazepam  0.5 mg Oral Q6H PRN Max 4/day [facility], CRNP      LORazepam  1 mg Oral Q6H PRN Max 3/day [facility], CRNP      nystatin   Topical BID PRN Sarah Mckay PA-C      OLANZapine  5 mg Intramuscular Q3H PRN Max 3/day Hines Gabrielle-[facility], CRNP      OLANZapine  2.5 mg Oral Q4H PRN Max 6/day [facility], CRNP      OLANZapine  5 mg Oral Q4H PRN Max 3/day [facility], CRNP      OLANZapine  5 mg Oral Q3H PRN Max 3/day [facility] Gabrielle[facility], CRNP      pantoprazole  20 mg Oral Early Morning Sarah LETTY Mckay PA-C      prazosin  1 mg Oral HS Renato Mcmanus MD      risperiDONE  2 mg Oral HS Renato Mcmanus MD      traZODone  50 mg Oral HS [facility], CRNP         Risks / Benefits of Treatment:     Risks, benefits, and possible side effects of medications explained to patient. Patient does not verbalize understanding of benefits or risks of treatment refusal at this time and needs ongoing explanation of treatment benefits and treatment plan.    Counseling / Coordination of  Care:     Patient's progress reviewed with nursing staff.  Medications, treatment progress and treatment plan reviewed with patient.  Supportive counseling provided to the patient.          SHANI Colbert

## 2024-08-11 NOTE — NURSING NOTE
B - Abdomen is soft and large due to obesity. Bowel sounds positive x4. Patient reports having had a BM today (08/11/24).  L - Lungs clear to auscultation. Denies SOB or chest pain.  E - Patient has +1 edema in BLLE.   P - CRT <3 seconds. PPP.  S - Slight redness observed under right side of patient's abdomen. Written on clipboard for medical provider to assess. Dry, flaky, callused skin on BL heels, lac hydrin cream being applied.

## 2024-08-11 NOTE — NURSING NOTE
Patient out for meals. She has been napping in her room for a large part of the afternoon.  When awake, she is pleasant and reports a good mood today. Denies feeling anxious or depressed. She denies suicidal thoughts and thoughts of self harm throughout the day. She is asking to get clothing back and be off finger food diet. Encouraged to speak to treatment team tomorrow.  She has no complaints of pain. Compliant with medications.

## 2024-08-11 NOTE — PROGRESS NOTES
"Progress Note - Leydi Khan 54 y.o. female MRN: [id number]    Unit/Bed#: [facility] 209-02 Encounter: [id number]        Subjective:   Patient seen and examined at bedside after reviewing the chart and discussing the case with the caring staff.      Patient examined at bedside.  Patient has no acute symptoms.     Physical Exam   Vitals: Blood pressure 122/59, pulse 83, temperature 97.5 °F (36.4 °C), temperature source Temporal, resp. rate 18, height 5' 4\" (1.626 m), weight 136 kg (299 lb 6.4 oz), SpO2 94%.,Body mass index is 51.39 kg/m².  Constitutional: Patient in no acute distress.  HEENT: PERR, EOMI, MMM.  Cardiovascular: Normal rate and regular rhythm.    Pulmonary/Chest: Effort normal and breath sounds normal.   Abdomen: Soft, + BS, NT.    Assessment/Plan:  Leydi Khan is a(n) 54 y.o. year old female with MDD.     Cardiac with hx of HTN, HLD.  Continue lisinopril 20 mg daily, atorvastatin 10 mg daily, Lasix 20 mg daily.  Seizure disorder.  Patient is on Depakote DR 1250 mg at bedtime.  DJD/OA/chronic low back pain.  Tylenol, lidocaine patch daily and Voltaren gel.  GERD.  Patient is on Protonix 20 mg daily.  Prediabetes.  Hgb A1c 5.7% on 6/11/24.  Lifestyle modifications.  Neuropathy.  Continue gabapentin to 200 mg TID.  Vitamin D deficiency.  Patient started on vitamin D2 75021 units weekly x 8 weeks followed by vitamin D3 1000 units daily.   Vitamin B12 deficiency.  Not on supplement.  Intertrigo.  Involving undersurface of right breast, groin, sacrum.  Apply nystatin powder twice daily as needed.  Dry skin.  Bilateral feet.  Apply ammonium lactate twice daily.  Dry eyes.  Started on artificial tear drops twice daily 8/9/24.    The patient was discussed with Dr. Jarquin and he is in agreement with the above note.  "

## 2024-08-12 PROCEDURE — 99232 SBSQ HOSP IP/OBS MODERATE 35: CPT | Performed by: STUDENT IN AN ORGANIZED HEALTH CARE EDUCATION/TRAINING PROGRAM

## 2024-08-12 PROCEDURE — 80164 ASSAY DIPROPYLACETIC ACD TOT: CPT

## 2024-08-12 RX ORDER — RISPERIDONE 1 MG/1
1 TABLET, ORALLY DISINTEGRATING ORAL
Status: DISCONTINUED | OUTPATIENT
Start: 2024-08-12 | End: 2024-08-19

## 2024-08-12 RX ORDER — NYSTATIN 100000 [USP'U]/G
POWDER TOPICAL 2 TIMES DAILY
Status: DISCONTINUED | OUTPATIENT
Start: 2024-08-12 | End: 2024-08-19

## 2024-08-12 RX ADMIN — TRAZODONE HYDROCHLORIDE 50 MG: 50 TABLET ORAL at 21:14

## 2024-08-12 RX ADMIN — FUROSEMIDE 20 MG: 20 TABLET ORAL at 08:09

## 2024-08-12 RX ADMIN — Medication: at 19:30

## 2024-08-12 RX ADMIN — LISINOPRIL 20 MG: 20 TABLET ORAL at 08:09

## 2024-08-12 RX ADMIN — GABAPENTIN 300 MG: 300 CAPSULE ORAL at 21:12

## 2024-08-12 RX ADMIN — NYSTATIN: 100000 POWDER TOPICAL at 19:31

## 2024-08-12 RX ADMIN — GABAPENTIN 300 MG: 300 CAPSULE ORAL at 08:09

## 2024-08-12 RX ADMIN — DIVALPROEX SODIUM 1250 MG: 500 TABLET, FILM COATED, EXTENDED RELEASE ORAL at 21:12

## 2024-08-12 RX ADMIN — ATORVASTATIN CALCIUM 10 MG: 10 TABLET, FILM COATED ORAL at 15:24

## 2024-08-12 RX ADMIN — GLYCERIN 2 DROP: .002; .002; .01 SOLUTION/ DROPS OPHTHALMIC at 19:30

## 2024-08-12 RX ADMIN — CARIPRAZINE 1.5 MG: 1.5 CAPSULE, GELATIN COATED ORAL at 15:24

## 2024-08-12 RX ADMIN — PANTOPRAZOLE SODIUM 20 MG: 20 TABLET, DELAYED RELEASE ORAL at 06:20

## 2024-08-12 RX ADMIN — GLYCERIN 2 DROP: .002; .002; .01 SOLUTION/ DROPS OPHTHALMIC at 08:09

## 2024-08-12 RX ADMIN — RISPERIDONE 1 MG: 1 TABLET, ORALLY DISINTEGRATING ORAL at 21:14

## 2024-08-12 RX ADMIN — GABAPENTIN 300 MG: 300 CAPSULE ORAL at 15:24

## 2024-08-12 RX ADMIN — PRAZOSIN HYDROCHLORIDE 1 MG: 1 CAPSULE ORAL at 21:14

## 2024-08-12 NOTE — PROGRESS NOTES
08/12/24    Team Meeting   Meeting Type Daily Rounds   Team Members Present   Team Members Present Physician;Nurse;Occupational Therapist;   Physician Team Member Anneliese MCLEOD   Nursing Team Member Newton RN   Social Work Team Member Justice DE LA TORRE   OT Team Member Murphy CTRS   Patient/Family Present   Patient Present No   Patient's Family Present No   Papers, d/c finger foods, denies all, med compliant, return to [facility]

## 2024-08-12 NOTE — NURSING NOTE
Patient spends time in the community but does return to her room to nap at times. She attends select groups.  She denies depression, anxiety, suicidal thoughts, and thoughts of self harm today. She brightens in conversation. She has a positive attitude today and was happy to share that she is off finger food diet as of today. She is hopeful to continue her good behavior so she can get her regular clothing back tomorrow. She has no complaints of pain. Compliant with medications.

## 2024-08-12 NOTE — NURSING NOTE
Patient visible on the unit. She is bright and smiling. Social with staff and peers. Medication compliant. Denied anxiety,depression,SI,HI, or hallucinations. She stated she feels good and is on her best behavior because she is going to [facility]. She wants to be off of finger foods. Safety precautions maintained.

## 2024-08-12 NOTE — PROGRESS NOTES
"Progress Note - Leydi Khan 54 y.o. female MRN: [id number]    Unit/Bed#: -02 Encounter: [id number]        Subjective:   Patient seen and examined at bedside after reviewing the chart and discussing the case with the caring staff.      Patient examined at bedside.  Patient reports she has a toenail on left foot that has started to tear off.  Trimmed nail with nail clippers.  She tolerated well without any complications.  Denies any other complaints at this time.    Physical Exam   Vitals: Blood pressure 125/60, pulse 90, temperature 97.9 °F (36.6 °C), temperature source Temporal, resp. rate 18, height 5' 4\" (1.626 m), weight 136 kg (299 lb 6.4 oz), SpO2 97%.,Body mass index is 51.39 kg/m².  Constitutional: Patient in no acute distress.  HEENT: PERR, EOMI, MMM.  Cardiovascular: Normal rate and regular rhythm.  Pulmonary/Chest: Effort normal and breath sounds normal.  Abdomen: Soft, + BS, NT.    Assessment/Plan:  Leydi Khan is a(n) 54 y.o. year old female with MDD.     Cardiac with hx of HTN, HLD.  Continue lisinopril 20 mg daily, atorvastatin 10 mg daily, Lasix 20 mg daily.  Seizure disorder.  Patient is on Depakote DR 1250 mg at bedtime.  DJD/OA/chronic low back pain.  Tylenol, lidocaine patch daily and Voltaren gel.  GERD.  Patient is on Protonix 20 mg daily.  Prediabetes.  Hgb A1c 5.7% on 6/11/24.  Lifestyle modifications.  Neuropathy.  Continue gabapentin to 200 mg TID.  Vitamin D deficiency.  Patient started on vitamin D2 18237 units weekly x 8 weeks followed by vitamin D3 1000 units daily.   Vitamin B12 deficiency.  Not on supplement.  Intertrigo.  Involving undersurface of right breast, groin, sacrum.  Apply nystatin powder twice daily.  Dry skin.  Bilateral feet.  Apply ammonium lactate twice daily.  Dry eyes.  Started on artificial tear drops twice daily 8/9/24.    The patient was discussed with Dr. Jarquin and he is in agreement with the above note.  "

## 2024-08-12 NOTE — PROGRESS NOTES
Progress Note - Behavioral Health   Leydi Khan 54 y.o. female MRN: [id number]  Unit/Bed#: [facility] 200-02 Encounter: [id number]    Assessment & Plan   Principal Problem:    Schizoaffective disorder, depressive type (HCC)  Active Problems:    Primary hypertension    Hyperlipidemia    Class 3 severe obesity due to excess calories without serious comorbidity in adult (HCC)    PTSD (post-traumatic stress disorder)    Borderline personality disorder (HCC)    Bilateral leg edema      Behavior over the last 24 hours:  unchanged  Sleep: normal  Appetite: normal  Medication side effects: No  ROS: no complaints and all other systems are negative    Subjective: Leydi was seen today for psychiatric follow-up. Patient calm, cooperative. She is visible on the unit, social with peers. Patient is controlled on the unit with no recent self injurious behaviors. Patient is preoccupied on being discharge to [facility]. Patient made ware she will be discharging back to her group home when stable. She is compliant with her current medication regimen. She denied any SI/HI/AVH. She did not appear internally preoccupied.    Mental Status Evaluation:  Appearance:  age appropriate and overweight   Behavior:  calm   Speech:  normal pitch and normal volume   Mood:  Less labile   Affect:  Less labile   Thought Process:  goal directed   Associations: perseverative   Thought Content:  Ruminative thought   Perceptual Disturbances: Denied AVH, did not appear internally preoccupied   Risk Potential: Suicidal Ideations none at present  Homicidal Ideations none at present  Potential for Aggression No   Sensorium:  person, place, and time/date   Memory:  recent and remote memory grossly intact   Consciousness:  alert and awake    Attention: attention span appeared shorter than expected for age   Insight:  limited   Judgment: limited   Gait/Station: normal gait/station   Motor Activity: no abnormal movements     Progress Toward Goals: Unchanged. Patient is  ruminative in thought, she ruminates on being discharge from [facility]. She is compliant with his current medication regimen. She denied side effects from current psychotropic medication regimen. Will continue current psychotropic medication regimen. No discharge date at this time.     Recommended Treatment: Continue with group therapy, milieu therapy and occupational therapy.      Risks, benefits and possible side effects of Medications:   Risks, benefits, and possible side effects of medications explained to patient and patient verbalizes understanding.      Medications: all current active meds have been reviewed.    Labs: I have personally reviewed all pertinent laboratory/tests results. Most Recent Labs:   Lab Results   Component Value Date    WBC 6.93 08/07/2024    RBC 3.77 (L) 08/07/2024    HGB 12.0 08/07/2024    HCT 35.9 08/07/2024     08/07/2024    RDW 14.6 08/07/2024    NEUTROABS 4.34 04/20/2024    SODIUM 138 08/07/2024    K 4.4 08/07/2024    CL 98 08/07/2024    CO2 33 (H) 08/07/2024    BUN 10 08/07/2024    CREATININE 0.47 (L) 08/07/2024    GLUC 140 08/07/2024    GLUF 114 (H) 06/21/2024    CALCIUM 9.3 08/07/2024    AST 11 (L) 08/07/2024    ALT 17 08/07/2024    ALKPHOS 60 08/07/2024    TP 7.0 08/07/2024    ALB 3.9 08/07/2024    TBILI 0.49 08/07/2024    CHOLESTEROL 168 06/21/2024    HDL 32 (L) 06/21/2024    TRIG 156 (H) 06/21/2024    LDLCALC 105 (H) 06/21/2024    NONHDLC 136 06/21/2024    VALPROICTOT 51 07/04/2024    [id number] 2.179 08/07/2024    FREET4 0.65 06/11/2024    PREGSERUM Negative 04/28/2022    RPR Non-Reactive 12/13/2022    HGBA1C 5.7 (H) 06/11/2024     06/11/2024       Counseling / Coordination of Care  Total floor / unit time spent today 25 minutes.

## 2024-08-13 LAB — VALPROATE SERPL-MCNC: 56 UG/ML (ref 50–100)

## 2024-08-13 PROCEDURE — 99232 SBSQ HOSP IP/OBS MODERATE 35: CPT | Performed by: STUDENT IN AN ORGANIZED HEALTH CARE EDUCATION/TRAINING PROGRAM

## 2024-08-13 RX ORDER — MECLIZINE HCL 12.5 MG 12.5 MG/1
25 TABLET ORAL DAILY
Status: DISCONTINUED | OUTPATIENT
Start: 2024-08-13 | End: 2024-08-30 | Stop reason: HOSPADM

## 2024-08-13 RX ADMIN — RISPERIDONE 1 MG: 1 TABLET, ORALLY DISINTEGRATING ORAL at 20:59

## 2024-08-13 RX ADMIN — DIVALPROEX SODIUM 1250 MG: 500 TABLET, FILM COATED, EXTENDED RELEASE ORAL at 20:57

## 2024-08-13 RX ADMIN — FUROSEMIDE 20 MG: 20 TABLET ORAL at 08:30

## 2024-08-13 RX ADMIN — OLANZAPINE 5 MG: 5 TABLET, FILM COATED ORAL at 19:39

## 2024-08-13 RX ADMIN — CARIPRAZINE 1.5 MG: 1.5 CAPSULE, GELATIN COATED ORAL at 08:30

## 2024-08-13 RX ADMIN — MECLIZINE HYDROCHLORIDE 25 MG: 12.5 TABLET ORAL at 11:16

## 2024-08-13 RX ADMIN — GABAPENTIN 300 MG: 300 CAPSULE ORAL at 21:00

## 2024-08-13 RX ADMIN — GABAPENTIN 300 MG: 300 CAPSULE ORAL at 08:30

## 2024-08-13 RX ADMIN — Medication 1 APPLICATION: at 21:00

## 2024-08-13 RX ADMIN — PANTOPRAZOLE SODIUM 20 MG: 20 TABLET, DELAYED RELEASE ORAL at 05:51

## 2024-08-13 RX ADMIN — ACETAMINOPHEN 975 MG: 325 TABLET ORAL at 13:29

## 2024-08-13 RX ADMIN — LORAZEPAM 1 MG: 1 TABLET ORAL at 18:32

## 2024-08-13 RX ADMIN — GABAPENTIN 300 MG: 300 CAPSULE ORAL at 17:50

## 2024-08-13 RX ADMIN — LISINOPRIL 20 MG: 20 TABLET ORAL at 08:30

## 2024-08-13 RX ADMIN — ATORVASTATIN CALCIUM 10 MG: 10 TABLET, FILM COATED ORAL at 17:50

## 2024-08-13 RX ADMIN — TRAZODONE HYDROCHLORIDE 50 MG: 50 TABLET ORAL at 21:00

## 2024-08-13 NOTE — NURSING NOTE
Pt visible on unit and social with peers. Pt is calm, pleasant, and cooperative. Pt denies depression, anxiety, SI/HI/AVH. Pt was utilizing coloring as a coping skill and gifting peers with coloring pages. Pt attends group. Pt endorsed 9 out of 10 pain and was given Tylenol 975 at 1329. Continuous monitoring maintained.

## 2024-08-13 NOTE — NURSING NOTE
Patient continuing to have a good evening and has had no behaviors. She colored in the hallway with a peer without incident. Took HS medications without difficulty. Nystatin applied under right abdominal fold.

## 2024-08-13 NOTE — PROGRESS NOTES
Progress Note - Behavioral Health   Leydi Khan 54 y.o. female MRN: [id number]  Unit/Bed#: [facility] 200-02 Encounter: [id number]    Assessment & Plan   Principal Problem:    Schizoaffective disorder, depressive type (HCC)  Active Problems:    Primary hypertension    Hyperlipidemia    Class 3 severe obesity due to excess calories without serious comorbidity in adult (HCC)    PTSD (post-traumatic stress disorder)    Borderline personality disorder (HCC)    Bilateral leg edema      Behavior over the last 24 hours:  unchanged  Sleep: normal  Appetite: normal  Medication side effects: No  ROS: no complaints and all other systems are negative    Subjective: Leydi was seen today for psychiatric follow-up. Patient calm, she is visible on the unit, social with peers. Patient is labile in mood with periods of tearfulness. She reports depression, unable to give a reason why. According to nursing staff report, patient is compliant with her current medication regimen. She denied any thoughts of self harm, SI/HI/AVH. She did not appear internally preoccupied.     Mental Status Evaluation:  Appearance:  age appropriate and overweight   Behavior:  cooperative   Speech:  normal pitch and normal volume   Mood:   labile   Affect:   labile   Thought Process:  goal directed   Associations: perseverative   Thought Content:  Ruminative thought   Perceptual Disturbances: Denied AVH, did not appear internally preoccupied   Risk Potential: Suicidal Ideations none at present  Homicidal Ideations none at present  Potential for Aggression No   Sensorium:  person, place, and time/date   Memory:  recent and remote memory grossly intact   Consciousness:  alert and awake    Attention: attention span appeared shorter than expected for age   Insight:  limited   Judgment: limited   Gait/Station: normal gait/station   Motor Activity: no abnormal movements     Progress Toward Goals: Unchanged. Patient denies any thought of self harm, she is compliant with  her current psychotropic medication regimen. She denied any side effects from current psychotropic medication regimen. Will continue current psychotropic medication regimen. No discharge date at this time.     Recommended Treatment: Continue with group therapy, milieu therapy and occupational therapy.      Risks, benefits and possible side effects of Medications:   Risks, benefits, and possible side effects of medications explained to patient and patient verbalizes understanding.      Medications: all current active meds have been reviewed.    Labs: I have personally reviewed all pertinent laboratory/tests results. Most Recent Labs:   Lab Results   Component Value Date    WBC 6.93 08/07/2024    RBC 3.77 (L) 08/07/2024    HGB 12.0 08/07/2024    HCT 35.9 08/07/2024     08/07/2024    RDW 14.6 08/07/2024    NEUTROABS 4.34 04/20/2024    SODIUM 138 08/07/2024    K 4.4 08/07/2024    CL 98 08/07/2024    CO2 33 (H) 08/07/2024    BUN 10 08/07/2024    CREATININE 0.47 (L) 08/07/2024    GLUC 140 08/07/2024    GLUF 114 (H) 06/21/2024    CALCIUM 9.3 08/07/2024    AST 11 (L) 08/07/2024    ALT 17 08/07/2024    ALKPHOS 60 08/07/2024    TP 7.0 08/07/2024    ALB 3.9 08/07/2024    TBILI 0.49 08/07/2024    CHOLESTEROL 168 06/21/2024    HDL 32 (L) 06/21/2024    TRIG 156 (H) 06/21/2024    LDLCALC 105 (H) 06/21/2024    NONHDLC 136 06/21/2024    VALPROICTOT 56 08/12/2024    [id number] 2.179 08/07/2024    FREET4 0.65 06/11/2024    PREGSERUM Negative 04/28/2022    RPR Non-Reactive 12/13/2022    HGBA1C 5.7 (H) 06/11/2024     06/11/2024       Counseling / Coordination of Care  Total floor / unit time spent today 25 minutes.

## 2024-08-13 NOTE — PROGRESS NOTES
08/13/24    Team Meeting   Meeting Type Daily Rounds   Team Members Present   Team Members Present Physician;Nurse;Occupational Therapist;   Physician Team Member Anneliese MCLEOD   Nursing Team Member Newton RN   Social Work Team Member Justice DE LA TORRE   OT Team Member Murphy CTRS   Patient/Family Present   Patient Present No   Patient's Family Present No   Return to [facility], no d/c date, endorses dep, denies si.hi/avh, 1 item back at time, med adjustments

## 2024-08-13 NOTE — TREATMENT TEAM
"   08/13/24 1753   Smith Anxiety Scale   Anxious Mood 3   Tension 3   Fears 2   Insomnia 1   Intellectual 3   Depressed Mood 3   Somatic Complaints: Muscular 2   Somatic Complaints: Sensory 1   Cardiovascular Symptoms 0   Respiratory Symptoms 1   Gastrointestinal Symptoms 2   Genitourinary Symptoms 1   Autonomic Symptoms 3   Behavior at Interview 3   Smith Anxiety Score 28     Pt administered ativan 1 mg po at 1832 for severe anxiety. Pt was tearful and stated \"I have so much anxiety right now.\"   "

## 2024-08-13 NOTE — PROGRESS NOTES
"Progress Note - Leydi Khan 54 y.o. female MRN: [id number]    Unit/Bed#: [facility] 200-02 Encounter: [id number]        Subjective:   Patient seen and examined at bedside after reviewing the chart and discussing the case with the caring staff.      Patient examined at bedside.  Patient complaining of vertigo.  At home she takes meclizine every morning and requesting this.      Physical Exam   Vitals: Blood pressure 117/70, pulse 88, temperature 97.5 °F (36.4 °C), temperature source Temporal, resp. rate 18, height 5' 4\" (1.626 m), weight 136 kg (299 lb 6.4 oz), SpO2 91%.,Body mass index is 51.39 kg/m².  Constitutional: Patient in no acute distress.  HEENT: PERR, EOMI, MMM.  Cardiovascular: Normal rate and regular rhythm.  Pulmonary/Chest: Effort normal and breath sounds normal.  Abdomen: Soft, + BS, NT.    Assessment/Plan:  Leydi Khan is a(n) 54 y.o. year old female with MDD.     Cardiac with hx of HTN, HLD.  Continue lisinopril 20 mg daily, atorvastatin 10 mg daily, Lasix 20 mg daily.  Seizure disorder.  Patient is on Depakote DR 1250 mg at bedtime.  DJD/OA/chronic low back pain.  Tylenol, lidocaine patch daily and Voltaren gel.  GERD.  Patient is on Protonix 20 mg daily.  Prediabetes.  Hgb A1c 5.7% on 6/11/24.  Lifestyle modifications.  Neuropathy.  Continue gabapentin to 200 mg TID.  Vitamin D deficiency.  Patient started on vitamin D2 45085 units weekly x 8 weeks followed by vitamin D3 1000 units daily.   Vitamin B12 deficiency.  Not on supplement.  Intertrigo.  Involving undersurface of right breast, groin, sacrum.  Apply nystatin powder twice daily.  Dry skin.  Bilateral feet.  Apply ammonium lactate twice daily.  Dry eyes.  Started on artificial tear drops twice daily 8/9/24.  Vertigo.  Continue meclizine 25 mg daily.     The patient was discussed with Dr. Jarquin and he is in agreement with the above note.  "

## 2024-08-14 PROCEDURE — 99232 SBSQ HOSP IP/OBS MODERATE 35: CPT

## 2024-08-14 RX ADMIN — LISINOPRIL 20 MG: 20 TABLET ORAL at 08:38

## 2024-08-14 RX ADMIN — CARIPRAZINE 1.5 MG: 1.5 CAPSULE, GELATIN COATED ORAL at 08:39

## 2024-08-14 RX ADMIN — NYSTATIN: 100000 POWDER TOPICAL at 17:26

## 2024-08-14 RX ADMIN — GABAPENTIN 300 MG: 300 CAPSULE ORAL at 17:25

## 2024-08-14 RX ADMIN — ATORVASTATIN CALCIUM 10 MG: 10 TABLET, FILM COATED ORAL at 17:25

## 2024-08-14 RX ADMIN — GABAPENTIN 300 MG: 300 CAPSULE ORAL at 21:30

## 2024-08-14 RX ADMIN — PANTOPRAZOLE SODIUM 20 MG: 20 TABLET, DELAYED RELEASE ORAL at 06:32

## 2024-08-14 RX ADMIN — MECLIZINE HYDROCHLORIDE 25 MG: 12.5 TABLET ORAL at 08:38

## 2024-08-14 RX ADMIN — Medication: at 17:26

## 2024-08-14 RX ADMIN — LORAZEPAM 1 MG: 1 TABLET ORAL at 15:02

## 2024-08-14 RX ADMIN — RISPERIDONE 1 MG: 1 TABLET, ORALLY DISINTEGRATING ORAL at 21:30

## 2024-08-14 RX ADMIN — GABAPENTIN 300 MG: 300 CAPSULE ORAL at 08:38

## 2024-08-14 RX ADMIN — FUROSEMIDE 20 MG: 20 TABLET ORAL at 08:38

## 2024-08-14 RX ADMIN — TRAZODONE HYDROCHLORIDE 50 MG: 50 TABLET ORAL at 21:30

## 2024-08-14 RX ADMIN — DIVALPROEX SODIUM 1250 MG: 500 TABLET, FILM COATED, EXTENDED RELEASE ORAL at 21:29

## 2024-08-14 NOTE — NURSING NOTE
Patient observed sleeping during most Q 7 minute safety checks. No HI/AH/VH noted. Slept in recliner till 0330. [place] safe and returned to own bedroom. Patient shows no s/s of distress.  No complaints of pain or aspiration risks.  Non-labored breathing.  Monitoring continues.  Fluids at bedside to promote hydration.

## 2024-08-14 NOTE — NURSING NOTE
"Patient reports feeling \"unsafe.\" She took a spork from her dinner tray but did not use it to cut herself. It was removed by this nurse. She states she is also having thoughts to bang her head, punch herself, or bite herself. She is sitting in a recliner chair in the hallway by the nurse's station. She is in paper clothing for safety.   "

## 2024-08-14 NOTE — NURSING NOTE
Remains in recliner at nursing station for safety. No further suicidal thoughts after 2130. Patient continues to not feel safe. Patient refused VS this evening. Unable to give prazosin (MINIPRESS) capsule 1 mg.

## 2024-08-14 NOTE — NURSING NOTE
Upon reassessment of PRN medication, patient remains tearful and states she feels no less anxious or upset. Medication ineffective.

## 2024-08-14 NOTE — PROGRESS NOTES
08/14/24    Team Meeting   Meeting Type Daily Rounds   Team Members Present   Team Members Present Physician;;Nurse;;Occupational Therapist   Physician Team Member davian ross   Nursing Team Member juany parker   Social Work Team Member angelina pinzon   OT Team Member ginny pinzon   Patient/Family Present   Patient Present No   Patient's Family Present No   PRN for agitation, passive si, contracts for safety, slept, endorses dep, writing notes about peer, no d/c date

## 2024-08-14 NOTE — NURSING NOTE
Administered 1 mg of ativan for increased anxiety. Smith score 30.Patient does not feel safe . She is currently sitting in chair by nurses station to be monitored.

## 2024-08-14 NOTE — PLAN OF CARE
Problem: Risk for Self Injury/Neglect  Goal: Treatment Goal: Remain safe during length of stay, learn and adopt new coping skills, and be free of self-injurious ideation, impulses and acts at the time of discharge  Outcome: Progressing  Goal: Verbalize thoughts and feelings  Description: Interventions:  - Assess and re-assess patient's lethality and potential for self-injury  - Engage patient in 1:1 interactions, daily, for a minimum of 15 minutes  - Encourage patient to express feelings, fears, frustrations, hopes  - Establish rapport/trust with patient   Interventions:  - Assess and re-assess patient's level of risk   - Engage patient in 1:1 interactions, daily, for a minimum of 15 minutes   - Encourage patient to express feelings, fears, frustrations, hopes   Outcome: Progressing  Goal: Attend and participate in unit activities, including therapeutic, recreational, and educational groups  Description: Interventions:  - Provide therapeutic and educational activities daily, encourage attendance and participation, and document same in the medical record  - Obtain collateral information, encourage visitation and family involvement in care   Interventions:  - Provide therapeutic and educational activities daily, encourage attendance and participation, and document same in the medical record   Outcome: Progressing  Goal: Recognize maladaptive responses and adopt new coping mechanisms  Outcome: Progressing     Problem: Depression  Goal: Verbalize thoughts and feelings  Description: Interventions:  - Assess and re-assess patient's lethality and potential for self-injury  - Engage patient in 1:1 interactions, daily, for a minimum of 15 minutes  - Encourage patient to express feelings, fears, frustrations, hopes  - Establish rapport/trust with patient   Interventions:  - Assess and re-assess patient's level of risk   - Engage patient in 1:1 interactions, daily, for a minimum of 15 minutes   - Encourage patient to express  feelings, fears, frustrations, hopes   Outcome: Progressing  Goal: Attend and participate in unit activities, including therapeutic, recreational, and educational groups  Description: Interventions:  - Provide therapeutic and educational activities daily, encourage attendance and participation, and document same in the medical record  - Obtain collateral information, encourage visitation and family involvement in care   Interventions:  - Provide therapeutic and educational activities daily, encourage attendance and participation, and document same in the medical record   Outcome: Progressing  Goal: Treatment Goal: Demonstrate behavioral control of depressive symptoms, verbalize feelings of improved mood/affect, and adopt new coping skills prior to discharge  Outcome: Progressing  Goal: Refrain from harming self  Description: Interventions:  - Monitor patient closely, per order   - Supervise medication ingestion, monitor effects and side effects   Outcome: Progressing  Goal: Refrain from isolation  Description: Interventions:  - Develop a trusting relationship   - Encourage socialization   Outcome: Progressing  Goal: Refrain from self-neglect  Outcome: Progressing  Goal: Complete daily ADLs, including personal hygiene independently, as able  Description: Interventions:  - Observe, teach, and assist patient with ADLS  -  Monitor and promote a balance of rest/activity, with adequate nutrition and elimination   Outcome: Progressing     Problem: Anxiety  Goal: Anxiety is at manageable level  Description: Interventions:  - Assess and monitor patient's anxiety level.   - Monitor for signs and symptoms (heart palpitations, chest pain, shortness of breath, headaches, nausea, feeling jumpy, restlessness, irritable, apprehensive).   - Collaborate with interdisciplinary team and initiate plan and interventions as ordered.  - [place] patient to unit/surroundings  - Explain treatment plan  - Encourage participation in care  -  Encourage verbalization of concerns/fears  - Identify coping mechanisms  - Assist in developing anxiety-reducing skills  - Administer/offer alternative therapies  - Limit or eliminate stimulants  Outcome: Progressing

## 2024-08-14 NOTE — PROGRESS NOTES
"Progress Note - Behavioral Health   Leydi Khan 54 y.o. female MRN: [id number]  Unit/Bed#: [facility] 200-02 Encounter: [id number]    Assessment & Plan   Principal Problem:    Schizoaffective disorder, depressive type (HCC)  Active Problems:    Primary hypertension    Hyperlipidemia    Class 3 severe obesity due to excess calories without serious comorbidity in adult (HCC)    PTSD (post-traumatic stress disorder)    Borderline personality disorder (HCC)    Bilateral leg edema      Behavior over the last 24 hours:  unchanged  Sleep: normal  Appetite: normal  Medication side effects: No  ROS: no complaints and all other systems are negative    Subjective: Leydi was seen today for psychiatric follow-up. According to nursing staff report, patient endorsed thoughts to self harm yesterday, and required some PRN's which were effective. On assessment today patient is cooperative, she denies any thoughts to self harm, SI/HI/AVH. She is visible on the unit, social with peers, she is compliant with her current medication regimen. She did not appear internally preoccupied.    Mental Status Evaluation:  Appearance:  age appropriate and overweight   Behavior:  cooperative   Speech:  normal pitch and normal volume   Mood:  \"depressed\"   Affect:   labile   Thought Process:  goal directed   Associations: perseverative   Thought Content:  Ruminative thought   Perceptual Disturbances: Denied AVH, did not appear internally preoccupied   Risk Potential: Suicidal Ideations none at present  Homicidal Ideations none at present  Potential for Aggression No   Sensorium:  person, place, and time/date   Memory:  recent and remote memory grossly intact   Consciousness:  alert and awake    Attention: attention span appeared shorter than expected for age   Insight:  limited   Judgment: limited   Gait/Station: normal gait/station   Motor Activity: no abnormal movements     Progress Toward Goals: Unchanged. Patient is cooperative, denies any self " injurious thoughts. She is compliant with her current psychotropic medication regimen. She denied any SI/HI/AVH. She did not appear internally preoccupied.    Recommended Treatment: Continue with group therapy, milieu therapy and occupational therapy.      Risks, benefits and possible side effects of Medications:   Risks, benefits, and possible side effects of medications explained to patient and patient verbalizes understanding.      Medications: all current active meds have been reviewed.    Labs: I have personally reviewed all pertinent laboratory/tests results. Most Recent Labs:   Lab Results   Component Value Date    WBC 6.93 08/07/2024    RBC 3.77 (L) 08/07/2024    HGB 12.0 08/07/2024    HCT 35.9 08/07/2024     08/07/2024    RDW 14.6 08/07/2024    NEUTROABS 4.34 04/20/2024    SODIUM 138 08/07/2024    K 4.4 08/07/2024    CL 98 08/07/2024    CO2 33 (H) 08/07/2024    BUN 10 08/07/2024    CREATININE 0.47 (L) 08/07/2024    GLUC 140 08/07/2024    GLUF 114 (H) 06/21/2024    CALCIUM 9.3 08/07/2024    AST 11 (L) 08/07/2024    ALT 17 08/07/2024    ALKPHOS 60 08/07/2024    TP 7.0 08/07/2024    ALB 3.9 08/07/2024    TBILI 0.49 08/07/2024    CHOLESTEROL 168 06/21/2024    HDL 32 (L) 06/21/2024    TRIG 156 (H) 06/21/2024    LDLCALC 105 (H) 06/21/2024    NONHDLC 136 06/21/2024    VALPROICTOT 56 08/12/2024    [id number] 2.179 08/07/2024    FREET4 0.65 06/11/2024    PREGSERUM Negative 04/28/2022    RPR Non-Reactive 12/13/2022    HGBA1C 5.7 (H) 06/11/2024     06/11/2024       Counseling / Coordination of Care  Total floor / unit time spent today 25 minutes.

## 2024-08-14 NOTE — PROGRESS NOTES
"Progress Note - Leydi Khan 54 y.o. female MRN: [id number]    Unit/Bed#: [facility] 200-02 Encounter: [id number]        Subjective:   Patient seen and examined at bedside after reviewing the chart and discussing the case with the caring staff.      Patient examined at bedside.  Patient has no acute symptoms.     Physical Exam   Vitals: Blood pressure 123/70, pulse 83, temperature 97.9 °F (36.6 °C), temperature source Temporal, resp. rate 18, height 5' 4\" (1.626 m), weight 136 kg (299 lb 6.4 oz), SpO2 96%.,Body mass index is 51.39 kg/m².  Constitutional: Patient in no acute distress.  HEENT: PERR, EOMI, MMM.  Cardiovascular: Normal rate and regular rhythm.  Pulmonary/Chest: Effort normal and breath sounds normal.  Abdomen: Soft, + BS, NT.    Assessment/Plan:  Leydi Khan is a(n) 54 y.o. year old female with MDD.     Cardiac with hx of HTN, HLD.  Continue lisinopril 20 mg daily, atorvastatin 10 mg daily, Lasix 20 mg daily.  Seizure disorder.  Patient is on Depakote DR 1250 mg at bedtime.  DJD/OA/chronic low back pain.  Tylenol, lidocaine patch daily and Voltaren gel.  GERD.  Patient is on Protonix 20 mg daily.  Prediabetes.  Hgb A1c 5.7% on 6/11/24.  Lifestyle modifications.  Neuropathy.  Continue gabapentin to 200 mg TID.  Vitamin D deficiency.  Patient started on vitamin D2 65820 units weekly x 8 weeks followed by vitamin D3 1000 units daily.  Vitamin B12 deficiency.  Not on supplement.  Intertrigo.  Involving undersurface of right breast, groin, sacrum.  Apply nystatin powder twice daily.  Dry skin.  Bilateral feet.  Apply ammonium lactate twice daily.  Dry eyes.  Started on artificial tear drops twice daily 8/9/24.  Vertigo.  Continue meclizine 25 mg daily.  Overgrown toenails.  Podiatry consulted.    The patient was discussed with Dr. Jarquin and he is in agreement with the above note.  "

## 2024-08-14 NOTE — NURSING NOTE
Patient assessed at 1939. Tearful, having thoughts of hurting self, restless. Allowed to sit by nursing station for safety. Stated Ativan given earlier was not effective. Broset Violence Checklist 4 at this time. Given Zyprexa 5 mg PO. Reassessed at 2039. Stated she felt no better, however, Broset Violence Checklist was 2. Kept at nursing station, will be allowed to return to room when she feels safe.

## 2024-08-15 PROCEDURE — 99232 SBSQ HOSP IP/OBS MODERATE 35: CPT | Performed by: PSYCHIATRY & NEUROLOGY

## 2024-08-15 RX ORDER — MAGNESIUM HYDROXIDE/ALUMINUM HYDROXICE/SIMETHICONE 120; 1200; 1200 MG/30ML; MG/30ML; MG/30ML
30 SUSPENSION ORAL EVERY 4 HOURS PRN
Status: DISCONTINUED | OUTPATIENT
Start: 2024-08-15 | End: 2024-08-30 | Stop reason: HOSPADM

## 2024-08-15 RX ORDER — POLYETHYLENE GLYCOL 3350 17 G/17G
17 POWDER, FOR SOLUTION ORAL DAILY PRN
Status: DISCONTINUED | OUTPATIENT
Start: 2024-08-15 | End: 2024-08-30 | Stop reason: HOSPADM

## 2024-08-15 RX ORDER — AMOXICILLIN 250 MG
1 CAPSULE ORAL DAILY
Status: DISCONTINUED | OUTPATIENT
Start: 2024-08-15 | End: 2024-08-16

## 2024-08-15 RX ADMIN — MECLIZINE HYDROCHLORIDE 25 MG: 12.5 TABLET ORAL at 08:38

## 2024-08-15 RX ADMIN — PRAZOSIN HYDROCHLORIDE 1 MG: 1 CAPSULE ORAL at 22:03

## 2024-08-15 RX ADMIN — Medication: at 08:37

## 2024-08-15 RX ADMIN — CARIPRAZINE 1.5 MG: 1.5 CAPSULE, GELATIN COATED ORAL at 08:38

## 2024-08-15 RX ADMIN — TRAZODONE HYDROCHLORIDE 50 MG: 50 TABLET ORAL at 22:03

## 2024-08-15 RX ADMIN — LISINOPRIL 20 MG: 20 TABLET ORAL at 08:37

## 2024-08-15 RX ADMIN — GABAPENTIN 300 MG: 300 CAPSULE ORAL at 16:49

## 2024-08-15 RX ADMIN — ATORVASTATIN CALCIUM 10 MG: 10 TABLET, FILM COATED ORAL at 16:49

## 2024-08-15 RX ADMIN — GABAPENTIN 300 MG: 300 CAPSULE ORAL at 08:38

## 2024-08-15 RX ADMIN — SENNOSIDES AND DOCUSATE SODIUM 1 TABLET: 50; 8.6 TABLET ORAL at 12:46

## 2024-08-15 RX ADMIN — PANTOPRAZOLE SODIUM 20 MG: 20 TABLET, DELAYED RELEASE ORAL at 05:38

## 2024-08-15 RX ADMIN — DIVALPROEX SODIUM 1250 MG: 500 TABLET, FILM COATED, EXTENDED RELEASE ORAL at 22:04

## 2024-08-15 RX ADMIN — FUROSEMIDE 20 MG: 20 TABLET ORAL at 08:38

## 2024-08-15 RX ADMIN — RISPERIDONE 1 MG: 1 TABLET, ORALLY DISINTEGRATING ORAL at 22:04

## 2024-08-15 RX ADMIN — GABAPENTIN 300 MG: 300 CAPSULE ORAL at 22:03

## 2024-08-15 NOTE — PROGRESS NOTES
"Progress Note - Behavioral Health   Leydi Khan 54 y.o. female MRN: [id number]  Unit/Bed#: [facility] 200-02 Encounter: [id number]    Assessment & Plan   Principal Problem:    Schizoaffective disorder, depressive type (HCC)  Active Problems:    Primary hypertension    Hyperlipidemia    Class 3 severe obesity due to excess calories without serious comorbidity in adult (HCC)    PTSD (post-traumatic stress disorder)    Borderline personality disorder (HCC)    Bilateral leg edema      Behavior over the last 24 hours:  unchanged  Sleep: normal  Appetite: normal  Medication side effects: No  ROS: no complaints and all other systems are negative    Subjective: Leydi was seen today for psychiatric follow-up. Patient is calm, cooperative. She is visible on the unit. Patient reports feeling unsafe on the unit yesterday, but is feeling safe now, patient is able to contract for safety on the unit. Patient endorses depression. She denied any SI/HI/AVH. She did not appear internally preoccupied.    Mental Status Evaluation:  Appearance:  age appropriate and overweight   Behavior:  calm   Speech:  normal pitch and normal volume   Mood:  \"depressed\"   Affect:   labile   Thought Process:  goal directed   Associations: perseverative   Thought Content:  Intrusive thoughts   Perceptual Disturbances: Denied AVH, did not appear internally preoccupied   Risk Potential: Suicidal Ideations none at present  Homicidal Ideations none at present  Potential for Aggression No   Sensorium:  person, place, and time/date   Memory:  recent and remote memory grossly intact   Consciousness:  alert and awake    Attention: attention span appeared shorter than expected for age   Insight:  limited   Judgment: limited   Gait/Station: Seated in a chair   Motor Activity: no abnormal movements     Progress Toward Goals: Unchanged. Patient is calm, she denied any current thoughts to self injure. She is compliant with her current psychotropic medication regimen. Will " continue current psychotropic medication regimen. No  discharge date at this time.    Recommended Treatment: Continue with group therapy, milieu therapy and occupational therapy.      Risks, benefits and possible side effects of Medications:   Risks, benefits, and possible side effects of medications explained to patient and patient verbalizes understanding.      Medications: all current active meds have been reviewed.    Labs: I have personally reviewed all pertinent laboratory/tests results. Most Recent Labs:   Lab Results   Component Value Date    WBC 6.93 08/07/2024    RBC 3.77 (L) 08/07/2024    HGB 12.0 08/07/2024    HCT 35.9 08/07/2024     08/07/2024    RDW 14.6 08/07/2024    NEUTROABS 4.34 04/20/2024    SODIUM 138 08/07/2024    K 4.4 08/07/2024    CL 98 08/07/2024    CO2 33 (H) 08/07/2024    BUN 10 08/07/2024    CREATININE 0.47 (L) 08/07/2024    GLUC 140 08/07/2024    GLUF 114 (H) 06/21/2024    CALCIUM 9.3 08/07/2024    AST 11 (L) 08/07/2024    ALT 17 08/07/2024    ALKPHOS 60 08/07/2024    TP 7.0 08/07/2024    ALB 3.9 08/07/2024    TBILI 0.49 08/07/2024    CHOLESTEROL 168 06/21/2024    HDL 32 (L) 06/21/2024    TRIG 156 (H) 06/21/2024    LDLCALC 105 (H) 06/21/2024    NONHDLC 136 06/21/2024    VALPROICTOT 56 08/12/2024    [id number] 2.179 08/07/2024    FREET4 0.65 06/11/2024    PREGSERUM Negative 04/28/2022    RPR Non-Reactive 12/13/2022    HGBA1C 5.7 (H) 06/11/2024     06/11/2024       Counseling / Coordination of Care  Total floor / unit time spent today 25 minutes.

## 2024-08-15 NOTE — PLAN OF CARE
Problem: Ineffective Coping  Goal: Cooperates with admission process  Description: Interventions:   - Complete admission process  Outcome: Progressing  Goal: Identifies ineffective coping skills  Outcome: Progressing  Goal: Identifies healthy coping skills  Outcome: Progressing  Goal: Demonstrates healthy coping skills  Outcome: Progressing  Goal: Participates in unit activities  Description: Interventions:  - Provide therapeutic environment   - Provide required programming   - Redirect inappropriate behaviors   Outcome: Progressing  Goal: Patient/Family participate in treatment and DC plans  Description: Interventions:  - Provide therapeutic environment  Outcome: Progressing  Goal: Patient/Family verbalizes awareness of resources  Outcome: Progressing  Goal: Understands least restrictive measures  Description: Interventions:  - Utilize least restrictive behavior  Outcome: Progressing  Goal: Free from restraint events  Description: - Utilize least restrictive measures   - Provide behavioral interventions   - Redirect inappropriate behaviors   Outcome: Progressing     Problem: Risk for Self Injury/Neglect  Goal: Treatment Goal: Remain safe during length of stay, learn and adopt new coping skills, and be free of self-injurious ideation, impulses and acts at the time of discharge  Outcome: Progressing  Goal: Verbalize thoughts and feelings  Description: Interventions:  - Assess and re-assess patient's lethality and potential for self-injury  - Engage patient in 1:1 interactions, daily, for a minimum of 15 minutes  - Encourage patient to express feelings, fears, frustrations, hopes  - Establish rapport/trust with patient   Interventions:  - Assess and re-assess patient's level of risk   - Engage patient in 1:1 interactions, daily, for a minimum of 15 minutes   - Encourage patient to express feelings, fears, frustrations, hopes   Outcome: Progressing  Goal: Attend and participate in unit activities, including  therapeutic, recreational, and educational groups  Description: Interventions:  - Provide therapeutic and educational activities daily, encourage attendance and participation, and document same in the medical record  - Obtain collateral information, encourage visitation and family involvement in care   Interventions:  - Provide therapeutic and educational activities daily, encourage attendance and participation, and document same in the medical record   Outcome: Progressing  Goal: Recognize maladaptive responses and adopt new coping mechanisms  Outcome: Progressing     Problem: Depression  Goal: Verbalize thoughts and feelings  Description: Interventions:  - Assess and re-assess patient's lethality and potential for self-injury  - Engage patient in 1:1 interactions, daily, for a minimum of 15 minutes  - Encourage patient to express feelings, fears, frustrations, hopes  - Establish rapport/trust with patient   Interventions:  - Assess and re-assess patient's level of risk   - Engage patient in 1:1 interactions, daily, for a minimum of 15 minutes   - Encourage patient to express feelings, fears, frustrations, hopes   Outcome: Progressing  Goal: Attend and participate in unit activities, including therapeutic, recreational, and educational groups  Description: Interventions:  - Provide therapeutic and educational activities daily, encourage attendance and participation, and document same in the medical record  - Obtain collateral information, encourage visitation and family involvement in care   Interventions:  - Provide therapeutic and educational activities daily, encourage attendance and participation, and document same in the medical record   Outcome: Progressing  Goal: Treatment Goal: Demonstrate behavioral control of depressive symptoms, verbalize feelings of improved mood/affect, and adopt new coping skills prior to discharge  Outcome: Progressing  Goal: Refrain from harming self  Description: Interventions:  -  Monitor patient closely, per order   - Supervise medication ingestion, monitor effects and side effects   Outcome: Progressing  Goal: Refrain from isolation  Description: Interventions:  - Develop a trusting relationship   - Encourage socialization   Outcome: Progressing  Goal: Refrain from self-neglect  Outcome: Progressing  Goal: Complete daily ADLs, including personal hygiene independently, as able  Description: Interventions:  - Observe, teach, and assist patient with ADLS  -  Monitor and promote a balance of rest/activity, with adequate nutrition and elimination   Outcome: Progressing     Problem: Anxiety  Goal: Anxiety is at manageable level  Description: Interventions:  - Assess and monitor patient's anxiety level.   - Monitor for signs and symptoms (heart palpitations, chest pain, shortness of breath, headaches, nausea, feeling jumpy, restlessness, irritable, apprehensive).   - Collaborate with interdisciplinary team and initiate plan and interventions as ordered.  - [place] patient to unit/surroundings  - Explain treatment plan  - Encourage participation in care  - Encourage verbalization of concerns/fears  - Identify coping mechanisms  - Assist in developing anxiety-reducing skills  - Administer/offer alternative therapies  - Limit or eliminate stimulants  Outcome: Progressing

## 2024-08-15 NOTE — NURSING NOTE
Patient remains visible out of room due to SI no plan. Patient is tearful this morning. Patient lefty via coloring; allowed crayons for safety precautions. Patient changed to finger foods no utensils and remains in paper clothing. Sheets removed for safety precautions. Patient is compliant with medications this morning. Requested senna for constipation; medical aware. No acute behaviors noted. Childlike behaviors. Patient states no one cares for patient. Support provided and reassured services provided to patient for safety and caring staff. Patient will come to staff if feeling unsafe. Q 7 min safety checks maintained. Fall precautions maintained.

## 2024-08-15 NOTE — SOCIAL WORK
Cm met with pt in dining area for check in. Pt was tearful discussing returning to . Pt states she wants to live at [facility] with her boyfriend kvng.  Pt states that she is unsure what will change going to an [facility]. Pt states she prefers to be in the hospital bc she believes she belongs in a hospital.  CM provided redirection and encouraged pt use her coping skills.

## 2024-08-15 NOTE — NURSING NOTE
Patient sat in recliner at nurse's station throughout most of the evening. She told this nurse after her HS medications she was feeling safe to return to her room. She was allowed to sleep in her own bed. No behaviors throughout the rest of the evening. She is resting comfortably in bed.

## 2024-08-15 NOTE — PROGRESS NOTES
"Progress Note - Leydi Khan 54 y.o. female MRN: [id number]    Unit/Bed#: -02 Encounter: [id number]        Subjective:   Patient seen and examined at bedside after reviewing the chart and discussing the case with the caring staff.      Patient examined at bedside.  Patient complaining of foot pain due to overgrown toenails.  She otherwise denies any acute symptoms.    Physical Exam   Vitals: Blood pressure 146/67, pulse 82, temperature (!) 97.3 °F (36.3 °C), temperature source Temporal, resp. rate 16, height 5' 4\" (1.626 m), weight 136 kg (299 lb 6.4 oz), SpO2 99%.,Body mass index is 51.39 kg/m².  Constitutional: Patient in no acute distress.  HEENT: PERR, EOMI, MMM.  Cardiovascular: Normal rate and regular rhythm.  Pulmonary/Chest: Effort normal and breath sounds normal.  Abdomen: Soft, + BS, NT.    Assessment/Plan:  Leydi Khan is a(n) 54 y.o. year old female with MDD.    Cardiac with hx of HTN, HLD.  Continue lisinopril 20 mg daily, atorvastatin 10 mg daily, Lasix 20 mg daily.  Seizure disorder.  Patient is on Depakote DR 1250 mg at bedtime.  DJD/OA/chronic low back pain.  Tylenol, lidocaine patch daily and Voltaren gel.  GERD.  Patient is on Protonix 20 mg daily.  Prediabetes.  Hgb A1c 5.7% on 6/11/24.  Lifestyle modifications.  Neuropathy.  Continue gabapentin to 200 mg TID.  Vitamin D deficiency.  Patient started on vitamin D2 86483 units weekly x 8 weeks followed by vitamin D3 1000 units daily.  Vitamin B12 deficiency.  Not on supplement.  Intertrigo.  Involving undersurface of right breast, groin, sacrum.  Apply nystatin powder twice daily.  Dry skin.  Bilateral feet.  Apply ammonium lactate twice daily.  Dry eyes.  Started on artificial tear drops twice daily 8/9/24.  Vertigo.  Continue meclizine 25 mg daily.  Overgrown toenails.  Podiatry consulted.    The patient was discussed with Dr. Jarquin and he is in agreement with the above note.  "

## 2024-08-15 NOTE — PROGRESS NOTES
08/15/24    Team Meeting   Meeting Type Daily Rounds   Team Members Present   Team Members Present Physician;Nurse;Occupational Therapist;   Physician Team Member he ross   Nursing Team Member juany parker   Social Work Team Member angelina pinzon   OT Team Member ginny posadas   Patient/Family Present   Patient Present No   Patient's Family Present No   Si, papers, finger foods, crayons, PRN, tearful, no d/c date, monitor

## 2024-08-15 NOTE — SOCIAL WORK
Reagan spoke to [place] [phone]. Provided pt update. As per candy pt can return to  once stable and if she is to return to the hospital, pt will go to a hospital in [place]. Funded through [facility].

## 2024-08-16 PROCEDURE — 99232 SBSQ HOSP IP/OBS MODERATE 35: CPT | Performed by: PSYCHIATRY & NEUROLOGY

## 2024-08-16 RX ORDER — AMOXICILLIN 250 MG
1 CAPSULE ORAL 2 TIMES DAILY
Status: DISCONTINUED | OUTPATIENT
Start: 2024-08-16 | End: 2024-08-28

## 2024-08-16 RX ORDER — LACTULOSE 10 G/15ML
20 SOLUTION ORAL 2 TIMES DAILY PRN
Status: DISCONTINUED | OUTPATIENT
Start: 2024-08-16 | End: 2024-08-30 | Stop reason: HOSPADM

## 2024-08-16 RX ADMIN — TRAZODONE HYDROCHLORIDE 50 MG: 50 TABLET ORAL at 21:53

## 2024-08-16 RX ADMIN — SENNOSIDES AND DOCUSATE SODIUM 1 TABLET: 50; 8.6 TABLET ORAL at 17:14

## 2024-08-16 RX ADMIN — FUROSEMIDE 20 MG: 20 TABLET ORAL at 08:50

## 2024-08-16 RX ADMIN — RISPERIDONE 1 MG: 1 TABLET, ORALLY DISINTEGRATING ORAL at 21:54

## 2024-08-16 RX ADMIN — GABAPENTIN 300 MG: 300 CAPSULE ORAL at 08:50

## 2024-08-16 RX ADMIN — LISINOPRIL 20 MG: 20 TABLET ORAL at 08:50

## 2024-08-16 RX ADMIN — GABAPENTIN 300 MG: 300 CAPSULE ORAL at 17:13

## 2024-08-16 RX ADMIN — ATORVASTATIN CALCIUM 10 MG: 10 TABLET, FILM COATED ORAL at 17:14

## 2024-08-16 RX ADMIN — CARIPRAZINE 3 MG: 3 CAPSULE, GELATIN COATED ORAL at 08:52

## 2024-08-16 RX ADMIN — PRAZOSIN HYDROCHLORIDE 1 MG: 1 CAPSULE ORAL at 21:53

## 2024-08-16 RX ADMIN — SENNOSIDES AND DOCUSATE SODIUM 1 TABLET: 50; 8.6 TABLET ORAL at 08:50

## 2024-08-16 RX ADMIN — GABAPENTIN 300 MG: 300 CAPSULE ORAL at 21:53

## 2024-08-16 RX ADMIN — DIVALPROEX SODIUM 1250 MG: 500 TABLET, FILM COATED, EXTENDED RELEASE ORAL at 21:53

## 2024-08-16 RX ADMIN — MECLIZINE HYDROCHLORIDE 25 MG: 12.5 TABLET ORAL at 08:50

## 2024-08-16 NOTE — NURSING NOTE
"Pt visible on unit due to passive SI with no plan. Pt in paper scrubs and does not have a sheet on her bed due to passive SI. Pt tearful on assessment and stated \"no one cares about me.\" Pt endorses high anxiety and depression. Pt has passive SI. Denies HI/AVH. Pt reported she did not have a bowel movement for 5 days. Continuous monitoring maintained.   "

## 2024-08-16 NOTE — NURSING NOTE
Pt took all but one medication and spit out her last medication into a cup of water. It was difficult to tell which medication pt spit out.

## 2024-08-16 NOTE — PROGRESS NOTES
08/16/24    Team Meeting   Meeting Type Daily Rounds   Team Members Present   Team Members Present Physician;Nurse;;Occupational Therapist   Physician Team Member he ross   Nursing Team Member pernell parker   Social Work Team Member angelina pinzon   OT Team Member ginny ctrs   Patient/Family Present   Patient Present No   Patient's Family Present No   Return to [facility], visible, tearful, anx, attention seeking, spit out 1 pill, childlike, resistive to meds, encouragement, no d/c date

## 2024-08-16 NOTE — PROGRESS NOTES
Progress Note - Behavioral Health   Leydi Khan 54 y.o. female MRN: [id number]  Unit/Bed#: [facility] 200-02 Encounter: [id number]    Assessment & Plan   Principal Problem:    Schizoaffective disorder, depressive type (HCC)  Active Problems:    Primary hypertension    Hyperlipidemia    Class 3 severe obesity due to excess calories without serious comorbidity in adult (HCC)    PTSD (post-traumatic stress disorder)    Borderline personality disorder (HCC)    Bilateral leg edema      Behavior over the last 24 hours:  unchanged  Sleep: normal  Appetite: normal  Medication side effects: No  ROS: no complaints and all other systems are negative    Subjective: Leydi was seen today for psychiatric follow-up.  Patient is visible on the unit social with peers.  She is labile on the unit. patient endorses suicidal ideations without a plan.  Patient is able to contract for safety on the unit.  Availability of staff reinforced with patient.  She appears anxious.  She is compliant with her current medication regimen.  She denied HI/AVH.  She did not appear internally preoccupied.    Mental Status Evaluation:  Appearance:  age appropriate and overweight   Behavior:  cooperative   Speech:  normal pitch and normal volume   Mood:  anxious   Affect:   labile   Thought Process:  goal directed   Associations: perseverative   Thought Content:  Intrusive thoughts   Perceptual Disturbances: Denied AVH, did not appear internally preoccupied   Risk Potential: Suicidal Ideations none at present  Homicidal Ideations none at present  Potential for Aggression No   Sensorium:  person, place, and time/date   Memory:  recent and remote memory grossly intact   Consciousness:  alert and awake    Attention: attention span appeared shorter than expected for age   Insight:  limited   Judgment: limited   Gait/Station: normal gait/station   Motor Activity: no abnormal movements     Progress Toward Goals: Unchanged.  Patient continues to endorse passive suicidal  ideation without a plan.  She is able to contract for safety on the unit.  She is compliant with her current psychotropic medication regimen.  She denies side effect from current psychotropic medication regimen.  Will continue current psychotropic medication regimen.  No discharge at this time.    Recommended Treatment: Continue with group therapy, milieu therapy and occupational therapy.      Risks, benefits and possible side effects of Medications:   Risks, benefits, and possible side effects of medications explained to patient and patient verbalizes understanding.      Medications: all current active meds have been reviewed.    Labs: I have personally reviewed all pertinent laboratory/tests results. Most Recent Labs:   Lab Results   Component Value Date    WBC 6.93 08/07/2024    RBC 3.77 (L) 08/07/2024    HGB 12.0 08/07/2024    HCT 35.9 08/07/2024     08/07/2024    RDW 14.6 08/07/2024    NEUTROABS 4.34 04/20/2024    SODIUM 138 08/07/2024    K 4.4 08/07/2024    CL 98 08/07/2024    CO2 33 (H) 08/07/2024    BUN 10 08/07/2024    CREATININE 0.47 (L) 08/07/2024    GLUC 140 08/07/2024    GLUF 114 (H) 06/21/2024    CALCIUM 9.3 08/07/2024    AST 11 (L) 08/07/2024    ALT 17 08/07/2024    ALKPHOS 60 08/07/2024    TP 7.0 08/07/2024    ALB 3.9 08/07/2024    TBILI 0.49 08/07/2024    CHOLESTEROL 168 06/21/2024    HDL 32 (L) 06/21/2024    TRIG 156 (H) 06/21/2024    LDLCALC 105 (H) 06/21/2024    NONHDLC 136 06/21/2024    VALPROICTOT 56 08/12/2024    [id number] 2.179 08/07/2024    FREET4 0.65 06/11/2024    PREGSERUM Negative 04/28/2022    RPR Non-Reactive 12/13/2022    HGBA1C 5.7 (H) 06/11/2024     06/11/2024       Counseling / Coordination of Care  Total floor / unit time spent today 25 minutes.

## 2024-08-16 NOTE — PLAN OF CARE
Problem: Ineffective Coping  Goal: Participates in unit activities  Description: Interventions:  - Provide therapeutic environment   - Provide required programming   - Redirect inappropriate behaviors   Outcome: Progressing     Problem: Risk for Self Injury/Neglect  Goal: Attend and participate in unit activities, including therapeutic, recreational, and educational groups  Description: Interventions:  - Provide therapeutic and educational activities daily, encourage attendance and participation, and document same in the medical record  - Obtain collateral information, encourage visitation and family involvement in care   Interventions:  - Provide therapeutic and educational activities daily, encourage attendance and participation, and document same in the medical record   Outcome: Progressing     Problem: Depression  Goal: Attend and participate in unit activities, including therapeutic, recreational, and educational groups  Description: Interventions:  - Provide therapeutic and educational activities daily, encourage attendance and participation, and document same in the medical record  - Obtain collateral information, encourage visitation and family involvement in care   Interventions:  - Provide therapeutic and educational activities daily, encourage attendance and participation, and document same in the medical record   Outcome: Progressing   Patient out for groups has been having negative thoughts and feels depressed.

## 2024-08-16 NOTE — PROGRESS NOTES
"Progress Note - Leydi Khan 54 y.o. female MRN: [id number]    Unit/Bed#: -02 Encounter: [id number]        Subjective:   Patient seen and examined at bedside after reviewing the chart and discussing the case with the caring staff.      Patient examined at bedside.  Patient complains of feeling constipated.  Has not had a bowel movement for the past few days.  No abdominal pain, nausea, vomiting.    Physical Exam   Vitals: Blood pressure 129/74, pulse 80, temperature (!) 97.1 °F (36.2 °C), temperature source Temporal, resp. rate 16, height 5' 4\" (1.626 m), weight 136 kg (299 lb 6.4 oz), SpO2 100%.,Body mass index is 51.39 kg/m².  Constitutional: Patient in no acute distress.  HEENT: PERR, EOMI, MMM.  Cardiovascular: Normal rate and regular rhythm.  Pulmonary/Chest: Effort normal and breath sounds normal.  Abdomen: Soft, + BS, NT.    Assessment/Plan:  Leydi Khan is a(n) 54 y.o. year old female with MDD.    Cardiac with hx of HTN, HLD.  Continue lisinopril 20 mg daily, atorvastatin 10 mg daily, Lasix 20 mg daily.  Seizure disorder.  Patient is on Depakote ER 1250 mg at bedtime.  DJD/OA/chronic low back pain.  Tylenol, lidocaine patch daily and Voltaren gel.  GERD.  Patient is on Protonix 20 mg daily.  Prediabetes.  Hgb A1c 5.7% on 6/11/24.  Lifestyle modifications.  Neuropathy.  Continue gabapentin to 300 mg TID.  Vitamin D deficiency.  Patient started on vitamin D2 29382 units weekly x 8 weeks followed by vitamin D3 1000 units daily.  Vitamin B12 deficiency.  Not on supplement.  Intertrigo.  Involving undersurface of right breast, groin, sacrum.  Apply nystatin powder twice daily.  Dry skin.  Bilateral feet.  Apply ammonium lactate twice daily.  Dry eyes.  Started on artificial tear drops twice daily 8/9/24.  Vertigo.  Continue meclizine 25 mg daily.  Overgrown toenails.  Podiatry consulted.  Constipation.  Patient started on Senokot-S twice daily.  MiraLAX and lactulose as needed.    The patient was discussed " with Dr. Jarquin and he is in agreement with the above note.

## 2024-08-16 NOTE — NURSING NOTE
Patient remains visible out of room due to SI no plan. Patient is tearful this morning due to friend leaving and was resistive initially to medications. Patient lefty via coloring; allowed crayons for safety precautions. Patient changed to finger foods no utensils yesterday and remains in paper clothing. Sheets removed for safety precautions. Patient is compliant with medications this morning. No acute behaviors noted. Patient showered and feels better as day progresses then feels sad towards end of day. Childlike behaviors. Support provided and reassured services provided to patient for safety and caring staff. Patient will come to staff if feeling unsafe. Q 7 min safety checks maintained. Fall precautions maintained.

## 2024-08-17 PROCEDURE — 99232 SBSQ HOSP IP/OBS MODERATE 35: CPT | Performed by: HOSPITALIST

## 2024-08-17 RX ADMIN — ERGOCALCIFEROL 50000 UNITS: 1.25 CAPSULE, LIQUID FILLED ORAL at 09:08

## 2024-08-17 RX ADMIN — SENNOSIDES AND DOCUSATE SODIUM 1 TABLET: 50; 8.6 TABLET ORAL at 09:09

## 2024-08-17 RX ADMIN — LISINOPRIL 20 MG: 20 TABLET ORAL at 09:09

## 2024-08-17 RX ADMIN — CARIPRAZINE 3 MG: 3 CAPSULE, GELATIN COATED ORAL at 09:09

## 2024-08-17 RX ADMIN — GABAPENTIN 300 MG: 300 CAPSULE ORAL at 15:59

## 2024-08-17 RX ADMIN — PRAZOSIN HYDROCHLORIDE 1 MG: 1 CAPSULE ORAL at 21:49

## 2024-08-17 RX ADMIN — GLYCERIN 2 DROP: .002; .002; .01 SOLUTION/ DROPS OPHTHALMIC at 09:11

## 2024-08-17 RX ADMIN — ATORVASTATIN CALCIUM 10 MG: 10 TABLET, FILM COATED ORAL at 15:59

## 2024-08-17 RX ADMIN — GABAPENTIN 300 MG: 300 CAPSULE ORAL at 09:09

## 2024-08-17 RX ADMIN — DIVALPROEX SODIUM 1250 MG: 500 TABLET, FILM COATED, EXTENDED RELEASE ORAL at 21:50

## 2024-08-17 RX ADMIN — HYDROXYZINE HYDROCHLORIDE 25 MG: 25 TABLET ORAL at 16:49

## 2024-08-17 RX ADMIN — GABAPENTIN 300 MG: 300 CAPSULE ORAL at 21:50

## 2024-08-17 RX ADMIN — PANTOPRAZOLE SODIUM 20 MG: 20 TABLET, DELAYED RELEASE ORAL at 06:12

## 2024-08-17 RX ADMIN — RISPERIDONE 1 MG: 1 TABLET, ORALLY DISINTEGRATING ORAL at 21:50

## 2024-08-17 RX ADMIN — MECLIZINE HYDROCHLORIDE 25 MG: 12.5 TABLET ORAL at 09:09

## 2024-08-17 RX ADMIN — FUROSEMIDE 20 MG: 20 TABLET ORAL at 09:09

## 2024-08-17 RX ADMIN — TRAZODONE HYDROCHLORIDE 50 MG: 50 TABLET ORAL at 21:50

## 2024-08-17 NOTE — NURSING NOTE
Patient ate dinner in the dining room and socialized with peers. Patient is no longer tearful and can maintain safety on the unit. PRN Atarax 25mg effective

## 2024-08-17 NOTE — NURSING NOTE
"Patient appears flat; labile and childlike at times. Patient denies SI/HI or AVH, reports moderate anxiety and depression. Patient became tearful during morning med pass due to \"her friend getting discharged soon.\" PRN offered for anxiety, patient declined. Patient med compliant, social with peers, and visible on the unit. Patient is able to make needs know; emotional support provided and reassurance given as needed. Continual q7 min rounding and safety checks in place.   "

## 2024-08-17 NOTE — PROGRESS NOTES
Progress Note - Behavioral Health   Leydi Khan 54 y.o. female MRN: [id number]  Unit/Bed#: [facility] 200-02 Encounter: [id number]      Assessment & Plan   Principal Problem:    Schizoaffective disorder, depressive type (HCC)  Active Problems:    Primary hypertension    Hyperlipidemia    Class 3 severe obesity due to excess calories without serious comorbidity in adult (HCC)    PTSD (post-traumatic stress disorder)    Borderline personality disorder (HCC)    Bilateral leg edema      Subjective:  Patient was seen today for continuation of care, records reviewed and patient was discussed with the morning case review team.  Per staff, Leydi continues to endorse depression.  She is visible on the unit and social with select peers.  She is meal and medication compliant.     Leydi was seen today for psychiatric follow-up.  On assessment today, Leydi was seen in the hallway away from peers.  Leydi presents as child like and anxious.  She reports increased depression because her friend on the unit is discharging Monday.  She is endorsing passive SI with no plan or intent.  She contracts for safety on the unit and would talk to staff if feeling unsafe.  She denies any side effects from current medications.  Leydi also denies HI/AH/VH, and does not appear overtly manic nor does she appear internally preoccupied.  No overt delusions or paranoia are verbalized.      Recommended Treatment: Treatment plan and medication changes discussed and per the attending physician the plan is:    1.Continue with group therapy, milieu therapy and occupational therapy  2.Behavioral Health checks every 7 minutes  3.Continue frequent safety checks and vitals per unit protocol  4.Continue with [facility]IM medical management as indicated  5.Continue with current medication regimen: Vraylar was increased yesterday to 3mg PO daily for mood.   6.Will review labs in the a.m.  7.Disposition Planning: Discharge planning and efforts remain ongoing    Vitals:  Vitals:     08/17/24 0708   BP: 130/60   Pulse: 80   Resp: 18   Temp: 98.2 °F (36.8 °C)   SpO2: 96%       Laboratory Results:  I have personally reviewed all pertinent laboratory/tests results.  Most Recent Labs:   Lab Results   Component Value Date    WBC 6.93 08/07/2024    RBC 3.77 (L) 08/07/2024    HGB 12.0 08/07/2024    HCT 35.9 08/07/2024     08/07/2024    RDW 14.6 08/07/2024    TOTANEUTABS 5.06 08/07/2024    NEUTROABS 4.34 04/20/2024    SODIUM 138 08/07/2024    K 4.4 08/07/2024    CL 98 08/07/2024    CO2 33 (H) 08/07/2024    BUN 10 08/07/2024    CREATININE 0.47 (L) 08/07/2024    GLUC 140 08/07/2024    GLUF 114 (H) 06/21/2024    CALCIUM 9.3 08/07/2024    AST 11 (L) 08/07/2024    ALT 17 08/07/2024    ALKPHOS 60 08/07/2024    TP 7.0 08/07/2024    ALB 3.9 08/07/2024    TBILI 0.49 08/07/2024    CHOLESTEROL 168 06/21/2024    HDL 32 (L) 06/21/2024    TRIG 156 (H) 06/21/2024    LDLCALC 105 (H) 06/21/2024    NONHDLC 136 06/21/2024    VALPROICTOT 56 08/12/2024    [id number] 2.179 08/07/2024    FREET4 0.65 06/11/2024    PREGUR negative 11/06/2022    PREGSERUM Negative 04/28/2022    RPR Non-Reactive 12/13/2022    HGBA1C 5.7 (H) 06/11/2024     06/11/2024       Psychiatric Review of Systems:  Behavior over the last 24 hours:  unchanged.   Sleep: adequate  Appetite: adequate  Medication side effects: denies and none observed  ROS: no complaints, denies shortness of breath or chest pain and all other systems are negative for acute changes    Mental Status Evaluation:    Appearance:  disheveled, marginal hygiene, paper scrubs   Behavior:  cooperative, child like   Speech:  loud   Mood:  anxious   Affect:  labile   Thought Process:  concrete   Associations: concrete associations   Thought Content:  no overt delusions, negative thinking   Perceptual Disturbances: denies auditory or visual hallucinations when asked, does not appear responding to internal stimuli   Risk Potential: Suicidal ideation - Yes, fleeting suicidal  thoughts, contracts for safety on the unit, would talk to staff if not feeling safe on the unit  Homicidal ideation - None  Potential for aggression - No   Sensorium:  oriented to person, place, and situation   Memory:  recent memory intact   Consciousness:  alert and awake   Attention/Concentration: attention span and concentration appear shorter than expected for age   Insight:  limited   Judgment: limited   Gait/Station: normal gait/station   Motor Activity: no abnormal movements     Progress Toward Goals:   Leydi is progressing towards goals of inpatient psychiatric treatment by continued medication compliance and is attending therapeutic modalities on the milieu. However, the patient continues to require inpatient psychiatric hospitalization for continued medication management and titration to optimize symptom reduction, improve sleep hygiene, and demonstrate adequate self-care.    Risk of Harm to Self:   Nursing Suicide Risk Assessment Last 24 hours: C-SSRS Risk (Since Last Contact)  Calculated C-SSRS Risk Score (Since Last Contact): No Risk Indicated  Current Specific Risk Factors include: fleeting suicidal ideation, diagnosis of mood disorder  Protective Factors: ability to communicate with staff on the unit, able to contract for safety on the unit, taking medications as ordered on the unit, ability to adapt to change, responds to redirection  Based on today's assessment, Leydi presents the following risk of harm to self: low    Risk of Harm to Others:  Nursing Homicide Risk Assessment: Violence Risk to Others: Denies within past 6 months  Current Specific Risk Factors include: multiple stressors, social difficulties  Protective Factors: no current homicidal ideation, compliant with medications on the unit as ordered, compliant with unit milieu, follows staff redirection  Based on today's assessment, Leydi presents the following risk of harm to others: low    The following interventions are recommended: behavioral  checks every 7 minutes, continued hospitalization on locked unit        Behavioral Health Medications: all current active meds have been reviewed and continue current psychiatric medications.  Current Facility-Administered Medications   Medication Dose Route Frequency Provider Last Rate    acetaminophen  650 mg Oral Q4H PRN Aditya Gabrielle-Anom, CRNP      acetaminophen  650 mg Oral Q4H PRN Aditya Gabrielle-Anom, CRNP      acetaminophen  975 mg Oral Q6H PRN Aditya Gabrielle-Anom, CRNP      aluminum-magnesium hydroxide-simethicone  30 mL Oral Q4H PRN Sarah L Dagnall, PA-C      ammonium lactate   Topical BID Sarah L Dagnall, PA-C      Artificial Tears  2 drop Both Eyes BID Sarah L Daglanel, PA-C      atorvastatin  10 mg Oral Daily With Dinner Aditya GabrielleKellie, CRNP      benztropine  0.5 mg Oral Q4H PRN Max 6/day Aditya Gabrielle-Anom, CRNP      cariprazine  3 mg Oral Daily Aditya GabrielleKellie, CRMANJINDER      [START ON 9/29/2024] Cholecalciferol  1,000 Units Oral Daily Saarh L Roel, PA-C      divalproex sodium  1,250 mg Oral HS Aditya GabrielleKellie, CRNP      ergocalciferol  50,000 Units Oral Weekly Sarah L Woody, PA-C      furosemide  20 mg Oral Daily [facility]ity[facility], CRNP      gabapentin  300 mg Oral TID Renato Mcmanus MD      hydrOXYzine HCL  25 mg Oral Q6H PRN Max 4/day Aditya Gabrielle-Anom, CRNP      lactulose  20 g Oral BID PRN Sarah Aul, PA-C      lisinopril  20 mg Oral Daily Aditya Gabrielle-Anom, CRNP      LORazepam  1 mg Intramuscular Q6H PRN Max 3/day Aditya Gabrielle-Anom, CRNP      LORazepam  0.5 mg Oral Q6H PRN Max 4/day Aditya Gabrielle-Anom, CRNP      LORazepam  1 mg Oral Q6H PRN Max 3/day Aditya Gabrielle-Anom, CRNP      meclizine  25 mg Oral Daily Sarah L Dagnall, PA-C      nystatin   Topical BID Sarah L Dagnall, PA-C      OLANZapine  5 mg Intramuscular Q3H PRN Max 3/day Aditya Gabrielle-Anom, CRNP      OLANZapine  2.5 mg Oral Q4H PRN Max 6/day SHANI Hawk      OLANZapine  5 mg Oral Q4H PRN Max  3/day SHANI Hawk      OLANZapine  5 mg Oral Q3H PRN Max 3/day SHANI Hawk      pantoprazole  20 mg Oral Early Morning Sarah Mckay PA-C      polyethylene glycol  17 g Oral Daily PRN Sarah Mckay PA-C      prazosin  1 mg Oral HS Renato Mcmanus MD      risperiDONE  1 mg Oral HS Roxanne Coy MD      senna-docusate sodium  1 tablet Oral BID Sarah Mckay PA-C      traZODone  50 mg Oral HS SHANI Hawk         Risks / Benefits of Treatment:  Risks, benefits, and possible side effects of medications explained to patient and patient verbalizes understanding and agreement for treatment.    Counseling / Coordination of Care:  Patient's progress reviewed with nursing staff.  Medications, treatment progress and treatment plan reviewed with patient.  Supportive counseling provided to the patient.    Total floor/unit time spent today 25 minutes. Greater than 50% of total time was spent with the patient and / or family counseling and / or coordination of care. A description of the counseling / coordination of care: medication education, treatment plan, supportive therapy.

## 2024-08-17 NOTE — PLAN OF CARE
Problem: Ineffective Coping  Goal: Participates in unit activities  Description: Interventions:  - Provide therapeutic environment   - Provide required programming   - Redirect inappropriate behaviors   Outcome: Progressing     Problem: Risk for Self Injury/Neglect  Goal: Verbalize thoughts and feelings  Description: Interventions:  - Assess and re-assess patient's lethality and potential for self-injury  - Engage patient in 1:1 interactions, daily, for a minimum of 15 minutes  - Encourage patient to express feelings, fears, frustrations, hopes  - Establish rapport/trust with patient   Interventions:  - Assess and re-assess patient's level of risk   - Engage patient in 1:1 interactions, daily, for a minimum of 15 minutes   - Encourage patient to express feelings, fears, frustrations, hopes   Outcome: Progressing     Problem: Depression  Goal: Verbalize thoughts and feelings  Description: Interventions:  - Assess and re-assess patient's lethality and potential for self-injury  - Engage patient in 1:1 interactions, daily, for a minimum of 15 minutes  - Encourage patient to express feelings, fears, frustrations, hopes  - Establish rapport/trust with patient   Interventions:  - Assess and re-assess patient's level of risk   - Engage patient in 1:1 interactions, daily, for a minimum of 15 minutes   - Encourage patient to express feelings, fears, frustrations, hopes   Outcome: Progressing  Goal: Refrain from harming self  Description: Interventions:  - Monitor patient closely, per order   - Supervise medication ingestion, monitor effects and side effects   Outcome: Progressing     Problem: Anxiety  Goal: Anxiety is at manageable level  Description: Interventions:  - Assess and monitor patient's anxiety level.   - Monitor for signs and symptoms (heart palpitations, chest pain, shortness of breath, headaches, nausea, feeling jumpy, restlessness, irritable, apprehensive).   - Collaborate with interdisciplinary team and  initiate plan and interventions as ordered.  - [place] patient to unit/surroundings  - Explain treatment plan  - Encourage participation in care  - Encourage verbalization of concerns/fears  - Identify coping mechanisms  - Assist in developing anxiety-reducing skills  - Administer/offer alternative therapies  - Limit or eliminate stimulants  Outcome: Progressing

## 2024-08-17 NOTE — PROGRESS NOTES
"Progress Note - Leydi Khan 54 y.o. female MRN: [id number]    Unit/Bed#: -02 Encounter: [id number]        Subjective:   Patient seen and examined at bedside after reviewing the chart and discussing the case with the caring staff.      Patient examined at bedside.  Patient denies any acute symptoms.    Physical Exam   Vitals: Blood pressure 130/60, pulse 80, temperature 98.2 °F (36.8 °C), temperature source Temporal, resp. rate 18, height 5' 4\" (1.626 m), weight (!) 136 kg (300 lb 6.4 oz), SpO2 96%.,Body mass index is 51.56 kg/m².  Constitutional: Patient in no acute distress.  HEENT: PERR, EOMI, MMM.  Cardiovascular: Normal rate and regular rhythm.  Pulmonary/Chest: Effort normal and breath sounds normal.  Abdomen: Soft, + BS, NT.    Assessment/Plan:  Leydi Khan is a(n) 54 y.o. year old female with MDD.    Cardiac with hx of HTN, HLD.  Continue lisinopril 20 mg daily, atorvastatin 10 mg daily, Lasix 20 mg daily.  Seizure disorder.  Patient is on Depakote ER 1250 mg at bedtime.  DJD/OA/chronic low back pain.  Tylenol, lidocaine patch daily and Voltaren gel.  GERD.  Patient is on Protonix 20 mg daily.  Prediabetes.  Hgb A1c 5.7% on 6/11/24.  Lifestyle modifications.  Neuropathy.  Continue gabapentin to 300 mg TID.  Vitamin D deficiency.  Patient started on vitamin D2 77461 units weekly x 8 weeks followed by vitamin D3 1000 units daily.  Vitamin B12 deficiency.  Not on supplement.  Intertrigo.  Involving undersurface of right breast, groin, sacrum.  Apply nystatin powder twice daily.  Dry skin.  Bilateral feet.  Apply ammonium lactate twice daily.  Dry eyes.  Started on artificial tear drops twice daily 8/9/24.  Vertigo.  Continue meclizine 25 mg daily.  Overgrown toenails.  Podiatry consulted.  Constipation.  Patient started on Senokot-S twice daily.  MiraLAX and lactulose as needed.    The patient was discussed with Dr. Jarquin and he is in agreement with the above note.  "

## 2024-08-17 NOTE — NURSING NOTE
Patient out for snack then withdrawn to room. Tearful at times. Compliant with medications. Denies SI, HI, A/V/H. Endorses mild depression and anxiety. No behaviors. Continuous rounding maintained.

## 2024-08-17 NOTE — NURSING NOTE
Patient presented to the nurses station tearful saying she was going to hurt herself. Patient was upset about an interaction she had with a peer. Patient was redirected to sit down by the nurses station. Patient was offered reassurance and support. PRN Atarax 25 mg was given for a Ham score of 10. Patient remains at nurses station to deescalate. Will reassess

## 2024-08-18 LAB
BACTERIA UR QL AUTO: ABNORMAL /HPF
BILIRUB UR QL STRIP: NEGATIVE
CLARITY UR: CLEAR
COLOR UR: YELLOW
GLUCOSE UR STRIP-MCNC: NEGATIVE MG/DL
HGB UR QL STRIP.AUTO: NEGATIVE
KETONES UR STRIP-MCNC: NEGATIVE MG/DL
LEUKOCYTE ESTERASE UR QL STRIP: ABNORMAL
NITRITE UR QL STRIP: NEGATIVE
NON-SQ EPI CELLS URNS QL MICRO: ABNORMAL /HPF
PH UR STRIP.AUTO: 6 [PH]
PROT UR STRIP-MCNC: NEGATIVE MG/DL
RBC #/AREA URNS AUTO: ABNORMAL /HPF
SP GR UR STRIP.AUTO: 1.02
UROBILINOGEN UR QL STRIP.AUTO: 4 E.U./DL
WBC #/AREA URNS AUTO: ABNORMAL /HPF

## 2024-08-18 PROCEDURE — 81003 URINALYSIS AUTO W/O SCOPE: CPT

## 2024-08-18 PROCEDURE — 99232 SBSQ HOSP IP/OBS MODERATE 35: CPT | Performed by: HOSPITALIST

## 2024-08-18 PROCEDURE — 81001 URINALYSIS AUTO W/SCOPE: CPT

## 2024-08-18 RX ADMIN — SENNOSIDES AND DOCUSATE SODIUM 1 TABLET: 50; 8.6 TABLET ORAL at 08:13

## 2024-08-18 RX ADMIN — PRAZOSIN HYDROCHLORIDE 1 MG: 1 CAPSULE ORAL at 21:08

## 2024-08-18 RX ADMIN — FUROSEMIDE 20 MG: 20 TABLET ORAL at 08:14

## 2024-08-18 RX ADMIN — GABAPENTIN 300 MG: 300 CAPSULE ORAL at 21:08

## 2024-08-18 RX ADMIN — ATORVASTATIN CALCIUM 10 MG: 10 TABLET, FILM COATED ORAL at 18:06

## 2024-08-18 RX ADMIN — GABAPENTIN 300 MG: 300 CAPSULE ORAL at 18:05

## 2024-08-18 RX ADMIN — PANTOPRAZOLE SODIUM 20 MG: 20 TABLET, DELAYED RELEASE ORAL at 06:02

## 2024-08-18 RX ADMIN — DIVALPROEX SODIUM 1250 MG: 500 TABLET, FILM COATED, EXTENDED RELEASE ORAL at 21:08

## 2024-08-18 RX ADMIN — TRAZODONE HYDROCHLORIDE 50 MG: 50 TABLET ORAL at 21:08

## 2024-08-18 RX ADMIN — CARIPRAZINE 3 MG: 3 CAPSULE, GELATIN COATED ORAL at 08:13

## 2024-08-18 RX ADMIN — RISPERIDONE 1 MG: 1 TABLET, ORALLY DISINTEGRATING ORAL at 21:08

## 2024-08-18 RX ADMIN — MECLIZINE HYDROCHLORIDE 25 MG: 12.5 TABLET ORAL at 08:13

## 2024-08-18 RX ADMIN — NYSTATIN: 100000 POWDER TOPICAL at 18:07

## 2024-08-18 RX ADMIN — SENNOSIDES AND DOCUSATE SODIUM 1 TABLET: 50; 8.6 TABLET ORAL at 18:05

## 2024-08-18 RX ADMIN — GABAPENTIN 300 MG: 300 CAPSULE ORAL at 08:13

## 2024-08-18 RX ADMIN — LISINOPRIL 20 MG: 20 TABLET ORAL at 08:13

## 2024-08-18 NOTE — PLAN OF CARE
Problem: Risk for Self Injury/Neglect  Goal: Verbalize thoughts and feelings  Description: Interventions:  - Assess and re-assess patient's lethality and potential for self-injury  - Engage patient in 1:1 interactions, daily, for a minimum of 15 minutes  - Encourage patient to express feelings, fears, frustrations, hopes  - Establish rapport/trust with patient   Interventions:  - Assess and re-assess patient's level of risk   - Engage patient in 1:1 interactions, daily, for a minimum of 15 minutes   - Encourage patient to express feelings, fears, frustrations, hopes   Outcome: Progressing     Problem: Ineffective Coping  Goal: Cooperates with admission process  Description: Interventions:   - Complete admission process  Outcome: Completed

## 2024-08-18 NOTE — NURSING NOTE
Pt visible on unit and social with peers. Pt took pencil from another peer and threatened to stab herself with it. Pt was moved outside of the nurses station for observation. Continuous monitoring maintained.

## 2024-08-18 NOTE — PROGRESS NOTES
Progress Note - Behavioral Health   Leydi Khan 54 y.o. female MRN: [id number]  Unit/Bed#: [facility] 200-02 Encounter: [id number]      Assessment & Plan   Principal Problem:    Schizoaffective disorder, depressive type (HCC)  Active Problems:    Primary hypertension    Hyperlipidemia    Class 3 severe obesity due to excess calories without serious comorbidity in adult (HCC)    PTSD (post-traumatic stress disorder)    Borderline personality disorder (HCC)    Bilateral leg edema      Subjective:  Patient was seen today for continuation of care, records reviewed and patient was discussed with the morning case review team.  Per staff, Leydi continues with suicidal ideation with no plan.  She informed staff that she felt she was going to harm herself yesterday, she sat by nursing station and received PRN Atarax 25mg PO.  It was effective and patient was able to maintain safety on the unit.      Leydi was seen today for psychiatric follow-up.  On assessment today, Leydi was seen in the group room.  Leydi was tearful during the interview.  She reports continued depression she relates to a peer being discharged tomorrow.  She reports continued thoughts of self harm but is layo for safety on the unit.  She states she would talk to staff as she did yesterday if she is feeling unsafe.  She plans doing some coloring today for distraction.  She denies any auditory or visual hallucinations today and did not appear to be internally preoccupied.  She denies any homicidal ideation.  She has been compliant with medications and denies any side effects at this time.      Recommended Treatment: Treatment plan and medication changes discussed and per the attending physician the plan is:    1.Continue with group therapy, milieu therapy and occupational therapy  2.Behavioral Health checks every 7 minutes  3.Continue frequent safety checks and vitals per unit protocol  4.Continue with SLIM medical management as indicated  5.Continue with current  medication regimen  6.Will review labs in the a.m.  7.Disposition Planning: Discharge planning and efforts remain ongoing    Vitals:  Vitals:    08/18/24 0653   BP: 117/67   Pulse: 91   Resp: 15   Temp: 97.8 °F (36.6 °C)   SpO2: 93%       Laboratory Results:  I have personally reviewed all pertinent laboratory/tests results.  Most Recent Labs:   Lab Results   Component Value Date    WBC 6.93 08/07/2024    RBC 3.77 (L) 08/07/2024    HGB 12.0 08/07/2024    HCT 35.9 08/07/2024     08/07/2024    RDW 14.6 08/07/2024    TOTANEUTABS 5.06 08/07/2024    NEUTROABS 4.34 04/20/2024    SODIUM 138 08/07/2024    K 4.4 08/07/2024    CL 98 08/07/2024    CO2 33 (H) 08/07/2024    BUN 10 08/07/2024    CREATININE 0.47 (L) 08/07/2024    GLUC 140 08/07/2024    GLUF 114 (H) 06/21/2024    CALCIUM 9.3 08/07/2024    AST 11 (L) 08/07/2024    ALT 17 08/07/2024    ALKPHOS 60 08/07/2024    TP 7.0 08/07/2024    ALB 3.9 08/07/2024    TBILI 0.49 08/07/2024    CHOLESTEROL 168 06/21/2024    HDL 32 (L) 06/21/2024    TRIG 156 (H) 06/21/2024    LDLCALC 105 (H) 06/21/2024    NONHDLC 136 06/21/2024    VALPROICTOT 56 08/12/2024    TBO1RIPQMHHR 2.179 08/07/2024    FREET4 0.65 06/11/2024    PREGUR negative 11/06/2022    PREGSERUM Negative 04/28/2022    RPR Non-Reactive 12/13/2022    HGBA1C 5.7 (H) 06/11/2024     06/11/2024       Psychiatric Review of Systems:  Behavior over the last 24 hours:  unchanged.   Sleep: adequate  Appetite: adequate  Medication side effects: denies and none observed  ROS: no complaints, denies shortness of breath or chest pain and all other systems are negative for acute changes    Mental Status Evaluation:    Appearance:  disheveled, paper scrubs   Behavior:  cooperative, calm, responds to redirection, child like   Speech:  normal rate and volume   Mood:  anxious   Affect:  tearful   Thought Process:  concrete   Associations: concrete associations   Thought Content:  no overt delusions, negative thinking   Perceptual  Disturbances: denies auditory or visual hallucinations when asked, does not appear responding to internal stimuli   Risk Potential: Suicidal ideation - Yes, fleeting suicidal thoughts, contracts for safety on the unit, would talk to staff if not feeling safe on the unit  Homicidal ideation - None  Potential for aggression - No   Sensorium:  oriented to person, place, and situation   Memory:  recent memory intact   Consciousness:  alert and awake   Attention/Concentration: attention span and concentration appear shorter than expected for age   Insight:  limited   Judgment: limited   Gait/Station: normal gait/station   Motor Activity: no abnormal movements     Progress Toward Goals:   Leydi is progressing towards goals of inpatient psychiatric treatment by continued medication compliance and is attending therapeutic modalities on the milieu. However, the patient continues to require inpatient psychiatric hospitalization for continued medication management and titration to optimize symptom reduction, improve sleep hygiene, and demonstrate adequate self-care.    Risk of Harm to Self:   Nursing Suicide Risk Assessment Last 24 hours: C-SSRS Risk (Since Last Contact)  Calculated C-SSRS Risk Score (Since Last Contact): No Risk Indicated  Current Specific Risk Factors include: fleeting suicidal ideation, diagnosis of mood disorder  Protective Factors: ability to communicate with staff on the unit, able to contract for safety on the unit, taking medications as ordered on the unit, ability to adapt to change, responds to redirection  Based on today's assessment, Leydi presents the following risk of harm to self: low    Risk of Harm to Others:  Nursing Homicide Risk Assessment: Violence Risk to Others: Denies within past 6 months  Current Specific Risk Factors include: multiple stressors, social difficulties  Protective Factors: no current homicidal ideation, compliant with medications on the unit as ordered, compliant with unit  milieu, follows staff redirection  Based on today's assessment, Leydi presents the following risk of harm to others: low    The following interventions are recommended: behavioral checks every 7 minutes, continued hospitalization on locked unit        Behavioral Health Medications: all current active meds have been reviewed and continue current psychiatric medications.  Current Facility-Administered Medications   Medication Dose Route Frequency Provider Last Rate    acetaminophen  650 mg Oral Q4H PRN Aditya Gabrielle-Anom, CRNP      acetaminophen  650 mg Oral Q4H PRN Aditya Gabrielle-Anom, CRNP      acetaminophen  975 mg Oral Q6H PRN Aditya Gabrielle-Anom, CRNP      aluminum-magnesium hydroxide-simethicone  30 mL Oral Q4H PRN Sarah L Dagnall, PA-C      ammonium lactate   Topical BID Sarah L Dagnall, PA-C      Artificial Tears  2 drop Both Eyes BID Sarah L Dagnall, PA-C      atorvastatin  10 mg Oral Daily With Dinner Aditya Gabrielle-Anom, CRNP      benztropine  0.5 mg Oral Q4H PRN Max 6/day [facility]      cariprazine  3 mg Oral Daily [facility]-Anocyril, CRNP      [START ON 9/29/2024] Cholecalciferol  1,000 Units Oral Daily Sarah L Dagnall, PA-C      divalproex sodium  1,250 mg Oral HS [facility]-Juanis, CRNP      ergocalciferol  50,000 Units Oral Weekly Sarah L Dagnall, PA-C      furosemide  20 mg Oral Daily [facility]-Anom, CRNP      gabapentin  300 mg Oral TID Renato Mcmanus MD      hydrOXYzine HCL  25 mg Oral Q6H PRN Max 4/day [facility]-Anom, CRNP      lactulose  20 g Oral BID PRN Sarah L Dagnall, PA-C      lisinopril  20 mg Oral Daily [facility]-Anom, CRNP      LORazepam  1 mg Intramuscular Q6H PRN Max 3/day Aditya Gabrielle-Anom, CRNP      LORazepam  0.5 mg Oral Q6H PRN Max 4/day Aditya Gabrielle-Anom, CRNP      LORazepam  1 mg Oral Q6H PRN Max 3/day [facility]-Anom, CRNP      meclizine  25 mg Oral Daily Sarah L Dagnall, PA-C      nystatin   Topical BID Sarah L Dagnall, PA-C       OLANZapine  5 mg Intramuscular Q3H PRN Max 3/day Aditya Gabrielle-Desmondm, CRNP      OLANZapine  2.5 mg Oral Q4H PRN Max 6/day Aditya Gabrielle-Desmondm, CRNP      OLANZapine  5 mg Oral Q4H PRN Max 3/day Aditya Gabrielle-Anom, CRNP      OLANZapine  5 mg Oral Q3H PRN Max 3/day Aditya Gabrielle-Anom, CRNP      pantoprazole  20 mg Oral Early Morning Sarah Mckay PA-C      polyethylene glycol  17 g Oral Daily PRN Sarah Mckay PA-C      prazosin  1 mg Oral HS Renato Mcmanus MD      risperiDONE  1 mg Oral HS Roxanne Coy MD      senna-docusate sodium  1 tablet Oral BID Sarah Mckay PA-C      traZODone  50 mg Oral HS Aditya Farias, SHANI         Risks / Benefits of Treatment:  Risks, benefits, and possible side effects of medications explained to patient and patient verbalizes understanding and agreement for treatment.    Counseling / Coordination of Care:  Patient's progress reviewed with nursing staff.  Medications, treatment progress and treatment plan reviewed with patient.  Supportive counseling provided to the patient.    Total floor/unit time spent today 25 minutes. Greater than 50% of total time was spent with the patient and / or family counseling and / or coordination of care. A description of the counseling / coordination of care: medication education, treatment plan, supportive therapy.

## 2024-08-18 NOTE — NURSING NOTE
Pt visible on unit and social with select peers. Pt endorses high depression, moderate anxiety, and thoughts to harm self with a peers pencil. Pt reports her depression is due to lonliness. Pt endorsed thoughts of self harm but states she will let staff know if she feels like acting on harming herself.Pt denies HI/AVH. BLEPS Assessment complete. Pt verbalized burning during urination with a foul order. Pt vaginal area redness and itching.Physician notified.     B- Pt states her last bm was 8/17/2024. Normoactive bowel sounds. Denies GI symptoms. Soft and nontender.    L- Lungs clear b/l, chest expansion symmetrical.     E- +1/+2 pitting edema on left leg, +1 pitting on R Leg    P- palpable L and R pedal pulses    S- redness on abdominal fold and on her pubic area.

## 2024-08-18 NOTE — NURSING NOTE
Patient visible on the unit, social with peers. Participated in snack. Tearful at times. Endorses mild anxiety and depression. Denies SI, HI, A/V/H. Compliant with medications. Continuous rounding maintained.

## 2024-08-18 NOTE — PROGRESS NOTES
"Progress Note - Leydi Khan 54 y.o. female MRN: [id number]    Unit/Bed#: -02 Encounter: [id number]        Subjective:   Patient seen and examined at bedside after reviewing the chart and discussing the case with the caring staff.      Patient examined at bedside.  Patient denies any acute symptoms.    Physical Exam   Vitals: Blood pressure 117/67, pulse 91, temperature 97.8 °F (36.6 °C), temperature source Temporal, resp. rate 15, height 5' 4\" (1.626 m), weight (!) 136 kg (300 lb 6.4 oz), SpO2 93%.,Body mass index is 51.56 kg/m².  Constitutional: Patient in no acute distress.  HEENT: PERR, EOMI, MMM.  Cardiovascular: Normal rate and regular rhythm.  Pulmonary/Chest: Effort normal and breath sounds normal.  Abdomen: Soft, + BS, NT.    Assessment/Plan:  Leydi Khan is a(n) 54 y.o. year old female with MDD.    Cardiac with hx of HTN, HLD.  Continue lisinopril 20 mg daily, atorvastatin 10 mg daily, Lasix 20 mg daily.  Seizure disorder.  Patient is on Depakote ER 1250 mg at bedtime.  DJD/OA/chronic low back pain.  Tylenol, lidocaine patch daily and Voltaren gel.  GERD.  Patient is on Protonix 20 mg daily.  Prediabetes.  Hgb A1c 5.7% on 6/11/24.  Lifestyle modifications.  Neuropathy.  Continue gabapentin to 300 mg TID.  Vitamin D deficiency.  Patient started on vitamin D2 54933 units weekly x 8 weeks followed by vitamin D3 1000 units daily.  Vitamin B12 deficiency.  Not on supplement.  Intertrigo.  Involving undersurface of right breast, groin, sacrum.  Apply nystatin powder twice daily.  Dry skin.  Bilateral feet.  Apply ammonium lactate twice daily.  Dry eyes.  Started on artificial tear drops twice daily 8/9/24.  Vertigo.  Continue meclizine 25 mg daily.  Overgrown toenails.  Podiatry consulted.  Constipation.  Patient started on Senokot-S twice daily.  MiraLAX and lactulose as needed.    The patient was discussed with Dr. Jarquin and he is in agreement with the above note.  "

## 2024-08-19 PROCEDURE — 99232 SBSQ HOSP IP/OBS MODERATE 35: CPT | Performed by: PSYCHIATRY & NEUROLOGY

## 2024-08-19 RX ORDER — RISPERIDONE 1 MG/1
1 TABLET ORAL
Status: DISCONTINUED | OUTPATIENT
Start: 2024-08-19 | End: 2024-08-30 | Stop reason: HOSPADM

## 2024-08-19 RX ORDER — FLUCONAZOLE 150 MG/1
150 TABLET ORAL ONCE
Status: COMPLETED | OUTPATIENT
Start: 2024-08-19 | End: 2024-08-19

## 2024-08-19 RX ORDER — PHENAZOPYRIDINE HYDROCHLORIDE 100 MG/1
100 TABLET, FILM COATED ORAL
Status: COMPLETED | OUTPATIENT
Start: 2024-08-19 | End: 2024-08-21

## 2024-08-19 RX ORDER — NYSTATIN 100000 [USP'U]/G
POWDER TOPICAL 3 TIMES DAILY
Status: DISCONTINUED | OUTPATIENT
Start: 2024-08-19 | End: 2024-08-27

## 2024-08-19 RX ADMIN — DIVALPROEX SODIUM 1250 MG: 500 TABLET, FILM COATED, EXTENDED RELEASE ORAL at 21:09

## 2024-08-19 RX ADMIN — FLUCONAZOLE 150 MG: 150 TABLET ORAL at 16:26

## 2024-08-19 RX ADMIN — RISPERIDONE 1 MG: 1 TABLET, FILM COATED ORAL at 21:09

## 2024-08-19 RX ADMIN — FUROSEMIDE 20 MG: 20 TABLET ORAL at 08:10

## 2024-08-19 RX ADMIN — GABAPENTIN 300 MG: 300 CAPSULE ORAL at 21:09

## 2024-08-19 RX ADMIN — SENNOSIDES AND DOCUSATE SODIUM 1 TABLET: 50; 8.6 TABLET ORAL at 08:09

## 2024-08-19 RX ADMIN — NYSTATIN: 100000 POWDER TOPICAL at 16:26

## 2024-08-19 RX ADMIN — PANTOPRAZOLE SODIUM 20 MG: 20 TABLET, DELAYED RELEASE ORAL at 06:28

## 2024-08-19 RX ADMIN — GABAPENTIN 300 MG: 300 CAPSULE ORAL at 16:26

## 2024-08-19 RX ADMIN — CARIPRAZINE 3 MG: 3 CAPSULE, GELATIN COATED ORAL at 08:10

## 2024-08-19 RX ADMIN — ATORVASTATIN CALCIUM 10 MG: 10 TABLET, FILM COATED ORAL at 16:26

## 2024-08-19 RX ADMIN — LISINOPRIL 20 MG: 20 TABLET ORAL at 08:10

## 2024-08-19 RX ADMIN — GABAPENTIN 300 MG: 300 CAPSULE ORAL at 08:10

## 2024-08-19 RX ADMIN — PHENAZOPYRIDINE 100 MG: 100 TABLET ORAL at 16:26

## 2024-08-19 RX ADMIN — PHENAZOPYRIDINE 100 MG: 100 TABLET ORAL at 12:28

## 2024-08-19 RX ADMIN — PRAZOSIN HYDROCHLORIDE 1 MG: 1 CAPSULE ORAL at 21:09

## 2024-08-19 RX ADMIN — SENNOSIDES AND DOCUSATE SODIUM 1 TABLET: 50; 8.6 TABLET ORAL at 17:05

## 2024-08-19 RX ADMIN — MECLIZINE HYDROCHLORIDE 25 MG: 12.5 TABLET ORAL at 08:10

## 2024-08-19 RX ADMIN — NYSTATIN: 100000 POWDER TOPICAL at 08:10

## 2024-08-19 RX ADMIN — TRAZODONE HYDROCHLORIDE 50 MG: 50 TABLET ORAL at 21:09

## 2024-08-19 RX ADMIN — Medication: at 21:08

## 2024-08-19 NOTE — PLAN OF CARE
Problem: Ineffective Coping  Goal: Demonstrates healthy coping skills  Outcome: Not Progressing  Goal: Participates in unit activities  Description: Interventions:  - Provide therapeutic environment   - Provide required programming   - Redirect inappropriate behaviors   Outcome: Progressing     Problem: Risk for Self Injury/Neglect  Goal: Verbalize thoughts and feelings  Description: Interventions:  - Assess and re-assess patient's lethality and potential for self-injury  - Engage patient in 1:1 interactions, daily, for a minimum of 15 minutes  - Encourage patient to express feelings, fears, frustrations, hopes  - Establish rapport/trust with patient   Interventions:  - Assess and re-assess patient's level of risk   - Engage patient in 1:1 interactions, daily, for a minimum of 15 minutes   - Encourage patient to express feelings, fears, frustrations, hopes   Outcome: Progressing     Problem: Depression  Goal: Verbalize thoughts and feelings  Description: Interventions:  - Assess and re-assess patient's lethality and potential for self-injury  - Engage patient in 1:1 interactions, daily, for a minimum of 15 minutes  - Encourage patient to express feelings, fears, frustrations, hopes  - Establish rapport/trust with patient   Interventions:  - Assess and re-assess patient's level of risk   - Engage patient in 1:1 interactions, daily, for a minimum of 15 minutes   - Encourage patient to express feelings, fears, frustrations, hopes   Outcome: Progressing  Goal: Refrain from harming self  Description: Interventions:  - Monitor patient closely, per order   - Supervise medication ingestion, monitor effects and side effects   Outcome: Not Progressing     Problem: Anxiety  Goal: Anxiety is at manageable level  Description: Interventions:  - Assess and monitor patient's anxiety level.   - Monitor for signs and symptoms (heart palpitations, chest pain, shortness of breath, headaches, nausea, feeling jumpy, restlessness,  irritable, apprehensive).   - Collaborate with interdisciplinary team and initiate plan and interventions as ordered.  - [place] patient to unit/surroundings  - Explain treatment plan  - Encourage participation in care  - Encourage verbalization of concerns/fears  - Identify coping mechanisms  - Assist in developing anxiety-reducing skills  - Administer/offer alternative therapies  - Limit or eliminate stimulants  Outcome: Progressing

## 2024-08-19 NOTE — PROGRESS NOTES
"Progress Note - Behavioral Health   Leydi Khan 54 y.o. female MRN: [id number]  Unit/Bed#: [facility]-02 Encounter: [id number]    Assessment & Plan   Principal Problem:    Schizoaffective disorder, depressive type (HCC)  Active Problems:    Primary hypertension    Hyperlipidemia    Class 3 severe obesity due to excess calories without serious comorbidity in adult (HCC)    PTSD (post-traumatic stress disorder)    Borderline personality disorder (HCC)    Bilateral leg edema      Behavior over the last 24 hours:  unchanged  Sleep: normal  Appetite: normal  Medication side effects: No  ROS: no complaints and all other systems are negative    Subjective: Leydi was seen today for psychiatric follow-up. Patient is visible on the unit , social with peers. Patient reports a sad mood because his friend is being discharged. Patient reassured. She also endorsed fleeting suicidal thoughts, patient able to contract for safety on the unit. Availability of staff reinforced. Patient is compliant with her current medication regimen. She denied any HI/AVH. She did not appear internally preoccupied.    Mental Status Evaluation:  Appearance:  disheveled   Behavior:  cooperative   Speech:  normal pitch and normal volume   Mood:  \"sad\"   Affect:  labile   Thought Process:  concrete   Associations: concrete associations   Thought Content:  Negative thinking, ruminative thoughts   Perceptual Disturbances: Denied AVH,did not appear internally preoccupied   Risk Potential: Suicidal Ideations Yes, fleeting suicidal thoughts, contracts fro safety on the unit.  Homicidal Ideations none at present  Potential for Aggression No   Sensorium:  person, place, and time/date   Memory:  recent and remote memory grossly intact   Consciousness:  alert and awake    Attention: attention span appeared shorter than expected for age   Insight:  limited   Judgment: limited   Gait/Station: normal gait/station   Motor Activity: no abnormal movements     Progress " Toward Goals: Unchanged. Patient is labile, with poor coping skills. She is compliant with her current psychotropic medication regimen. She denied side effects from current psychotropic medication regimen. Will continue current psychotropic medication regimen. No discharge date at this time.    Recommended Treatment: Continue with group therapy, milieu therapy and occupational therapy.      Risks, benefits and possible side effects of Medications:   Risks, benefits, and possible side effects of medications explained to patient and patient verbalizes understanding.      Medications: all current active meds have been reviewed.    Labs: I have personally reviewed all pertinent laboratory/tests results. Most Recent Labs:   Lab Results   Component Value Date    WBC 6.93 08/07/2024    RBC 3.77 (L) 08/07/2024    HGB 12.0 08/07/2024    HCT 35.9 08/07/2024     08/07/2024    RDW 14.6 08/07/2024    NEUTROABS 4.34 04/20/2024    SODIUM 138 08/07/2024    K 4.4 08/07/2024    CL 98 08/07/2024    CO2 33 (H) 08/07/2024    BUN 10 08/07/2024    CREATININE 0.47 (L) 08/07/2024    GLUC 140 08/07/2024    GLUF 114 (H) 06/21/2024    CALCIUM 9.3 08/07/2024    AST 11 (L) 08/07/2024    ALT 17 08/07/2024    ALKPHOS 60 08/07/2024    TP 7.0 08/07/2024    ALB 3.9 08/07/2024    TBILI 0.49 08/07/2024    CHOLESTEROL 168 06/21/2024    HDL 32 (L) 06/21/2024    TRIG 156 (H) 06/21/2024    LDLCALC 105 (H) 06/21/2024    NONHDLC 136 06/21/2024    VALPROICTOT 56 08/12/2024    [id number] 2.179 08/07/2024    FREET4 0.65 06/11/2024    PREGSERUM Negative 04/28/2022    RPR Non-Reactive 12/13/2022    HGBA1C 5.7 (H) 06/11/2024     06/11/2024       Counseling / Coordination of Care  Total floor / unit time spent today 25 minutes.

## 2024-08-19 NOTE — PROGRESS NOTES
"Progress Note - Leydi Khan 54 y.o. female MRN: [id number]    Unit/Bed#: -02 Encounter: [id number]        Subjective:   Patient seen and examined at bedside after reviewing the chart and discussing the case with the caring staff.      Patient examined at bedside.  Patient was complaining of burning when urinating yesterday.  UA was ordered - no evidence of UTI.  She states she is having vaginal itching and continued burning when urinating today.      Physical Exam   Vitals: Blood pressure 127/71, pulse 87, temperature 97.5 °F (36.4 °C), temperature source Temporal, resp. rate 18, height 5' 4\" (1.626 m), weight (!) 136 kg (300 lb 6.4 oz), SpO2 95%.,Body mass index is 51.56 kg/m².  Constitutional: Patient in no acute distress.  HEENT: PERR, EOMI, MMM.  Cardiovascular: Normal rate and regular rhythm.  Pulmonary/Chest: Effort normal and breath sounds normal.  Abdomen: Soft, + BS, NT.    Assessment/Plan:  Leydi Khan is a(n) 54 y.o. year old female with MDD.    Cardiac with hx of HTN, HLD.  Continue lisinopril 20 mg daily, atorvastatin 10 mg daily, Lasix 20 mg daily.  Seizure disorder.  Patient is on Depakote ER 1250 mg at bedtime.  DJD/OA/chronic low back pain.  Tylenol, lidocaine patch daily and Voltaren gel.  GERD.  Patient is on Protonix 20 mg daily.  Prediabetes.  Hgb A1c 5.7% on 6/11/24.  Lifestyle modifications.  Neuropathy.  Continue gabapentin to 300 mg TID.  Vitamin D deficiency.  Patient started on vitamin D2 73288 units weekly x 8 weeks followed by vitamin D3 1000 units daily.  Vitamin B12 deficiency.  Not on supplement.  Intertrigo.  Involving undersurface of right breast, groin, sacrum.  Apply nystatin powder twice daily.  Dry skin.  Bilateral feet.  Apply ammonium lactate twice daily.  Dry eyes.  Started on artificial tear drops twice daily 8/9/24.  Vertigo.  Continue meclizine 25 mg daily.  Overgrown toenails.  Podiatry consulted.  Constipation.  Patient started on Senokot-S twice daily.  MiraLAX " and lactulose as needed.  Dysuria.  UA 8/18 without evidence of UTI.  Start Pyridium 100 mg TID x 2 days 8/19.  Possible vaginal candidiasis.  Ordered Diflucan 150 mg x 1 dose 8/19.    The patient was discussed with Dr. Jarquin and he is in agreement with the above note.

## 2024-08-19 NOTE — NURSING NOTE
Patient is visible in milieu for meals and groups. Patient is thankful upon interaction and states she isn't doing well due to friend leaving today and she likes them. Reassured patient if appropriate and ok with other peer to keep in touch; support provided. Patient is compliant with medications. Denies SI/HI/AVH. Endorses anxiety and depression due to situation. No acute behaviors noted. Remains in papers and finger foods and no colored pencils only crayons. Q 7 min safety checks maintained. Fall precautions maintained.

## 2024-08-19 NOTE — NURSING NOTE
Patient visible on the unit. Flat and depressed. Tearful at times. Denies Si, HI, A/V/H. Endorses moderate anxiety and depression. Attempted to conceal a spork after snack. Able to be redirected. Compliant with medications. Continuous rounding maintained.

## 2024-08-19 NOTE — PROGRESS NOTES
08/19/24   Team Meeting   Meeting Type Daily Rounds   Team Members Present   Team Members Present Physician;Nurse;Occupational Therapist;   Physician Team Member he ross   Nursing Team Member Aleta VALDEZ   Social Work Team Member angelina pinzon   OT Team Member ginny ctrs   Patient/Family Present   Patient Present No   Patient's Family Present No   Papers, crayons, fingers foods, visible, compliant with meds, tearful, [facility]

## 2024-08-20 PROCEDURE — 99232 SBSQ HOSP IP/OBS MODERATE 35: CPT

## 2024-08-20 RX ADMIN — CARIPRAZINE 3 MG: 3 CAPSULE, GELATIN COATED ORAL at 08:58

## 2024-08-20 RX ADMIN — MECLIZINE HYDROCHLORIDE 25 MG: 12.5 TABLET ORAL at 08:58

## 2024-08-20 RX ADMIN — LISINOPRIL 20 MG: 20 TABLET ORAL at 08:58

## 2024-08-20 RX ADMIN — SENNOSIDES AND DOCUSATE SODIUM 1 TABLET: 50; 8.6 TABLET ORAL at 17:30

## 2024-08-20 RX ADMIN — TRAZODONE HYDROCHLORIDE 50 MG: 50 TABLET ORAL at 21:36

## 2024-08-20 RX ADMIN — ACETAMINOPHEN 975 MG: 325 TABLET ORAL at 11:21

## 2024-08-20 RX ADMIN — GABAPENTIN 300 MG: 300 CAPSULE ORAL at 08:58

## 2024-08-20 RX ADMIN — ATORVASTATIN CALCIUM 10 MG: 10 TABLET, FILM COATED ORAL at 17:30

## 2024-08-20 RX ADMIN — GABAPENTIN 300 MG: 300 CAPSULE ORAL at 17:30

## 2024-08-20 RX ADMIN — SENNOSIDES AND DOCUSATE SODIUM 1 TABLET: 50; 8.6 TABLET ORAL at 08:58

## 2024-08-20 RX ADMIN — ACETAMINOPHEN 975 MG: 325 TABLET ORAL at 21:36

## 2024-08-20 RX ADMIN — RISPERIDONE 1 MG: 1 TABLET, FILM COATED ORAL at 21:36

## 2024-08-20 RX ADMIN — PHENAZOPYRIDINE 100 MG: 100 TABLET ORAL at 17:30

## 2024-08-20 RX ADMIN — PRAZOSIN HYDROCHLORIDE 1 MG: 1 CAPSULE ORAL at 21:35

## 2024-08-20 RX ADMIN — GABAPENTIN 300 MG: 300 CAPSULE ORAL at 21:35

## 2024-08-20 RX ADMIN — PANTOPRAZOLE SODIUM 20 MG: 20 TABLET, DELAYED RELEASE ORAL at 05:06

## 2024-08-20 RX ADMIN — DIVALPROEX SODIUM 1250 MG: 500 TABLET, FILM COATED, EXTENDED RELEASE ORAL at 21:35

## 2024-08-20 RX ADMIN — PHENAZOPYRIDINE 100 MG: 100 TABLET ORAL at 11:21

## 2024-08-20 RX ADMIN — PHENAZOPYRIDINE 100 MG: 100 TABLET ORAL at 08:58

## 2024-08-20 RX ADMIN — FUROSEMIDE 20 MG: 20 TABLET ORAL at 08:58

## 2024-08-20 NOTE — PLAN OF CARE
Problem: Ineffective Coping  Goal: Demonstrates healthy coping skills  Outcome: Not Progressing  Goal: Participates in unit activities  Description: Interventions:  - Provide therapeutic environment   - Provide required programming   - Redirect inappropriate behaviors   Outcome: Progressing     Problem: Risk for Self Injury/Neglect  Goal: Treatment Goal: Remain safe during length of stay, learn and adopt new coping skills, and be free of self-injurious ideation, impulses and acts at the time of discharge  Outcome: Not Progressing  Goal: Verbalize thoughts and feelings  Description: Interventions:  - Assess and re-assess patient's lethality and potential for self-injury  - Engage patient in 1:1 interactions, daily, for a minimum of 15 minutes  - Encourage patient to express feelings, fears, frustrations, hopes  - Establish rapport/trust with patient   Interventions:  - Assess and re-assess patient's level of risk   - Engage patient in 1:1 interactions, daily, for a minimum of 15 minutes   - Encourage patient to express feelings, fears, frustrations, hopes   Outcome: Progressing  Goal: Recognize maladaptive responses and adopt new coping mechanisms  Outcome: Not Progressing     Problem: Depression  Goal: Verbalize thoughts and feelings  Description: Interventions:  - Assess and re-assess patient's lethality and potential for self-injury  - Engage patient in 1:1 interactions, daily, for a minimum of 15 minutes  - Encourage patient to express feelings, fears, frustrations, hopes  - Establish rapport/trust with patient   Interventions:  - Assess and re-assess patient's level of risk   - Engage patient in 1:1 interactions, daily, for a minimum of 15 minutes   - Encourage patient to express feelings, fears, frustrations, hopes   Outcome: Progressing  Goal: Treatment Goal: Demonstrate behavioral control of depressive symptoms, verbalize feelings of improved mood/affect, and adopt new coping skills prior to  discharge  Outcome: Not Progressing     Problem: Anxiety  Goal: Anxiety is at manageable level  Description: Interventions:  - Assess and monitor patient's anxiety level.   - Monitor for signs and symptoms (heart palpitations, chest pain, shortness of breath, headaches, nausea, feeling jumpy, restlessness, irritable, apprehensive).   - Collaborate with interdisciplinary team and initiate plan and interventions as ordered.  - [place] patient to unit/surroundings  - Explain treatment plan  - Encourage participation in care  - Encourage verbalization of concerns/fears  - Identify coping mechanisms  - Assist in developing anxiety-reducing skills  - Administer/offer alternative therapies  - Limit or eliminate stimulants  Outcome: Progressing

## 2024-08-20 NOTE — PROGRESS NOTES
08/20/24    Team Meeting   Meeting Type Daily Rounds   Team Members Present   Team Members Present Physician;Occupational Therapist;Nurse;   Physician Team Member he ross   Nursing Team Member malachi parker   Social Work Team Member angelina pinzon   OT Team Member luis e posadas   Patient/Family Present   Patient Present No   Patient's Family Present No   Papers, no d/c date due to med adjustments, passive si, dep, slept, return to [facility]

## 2024-08-20 NOTE — NURSING NOTE
"Patient was observed to be visible in the community this evening.  She has been calm and cooperative; presents with an irritable edge.  She has been seeking out staff asking for the return of various items such as sheets and pencils.  Currently she denies SI, HI and hallucinations. Leydi was medication compliant at  with her PO medications, however she initially declined then she was agreeable to taking them a moment later. She declined Nystatin powder and eye drops, informing this writer \"I don't want them.\".  Patient remains in paper pants and shirt.  Continuous safety rounding in progress.   "

## 2024-08-20 NOTE — TREATMENT TEAM
08/20/24 1121   Pain Assessment   Pain Assessment Tool 0-10   Pain Score 7   Pain Location/Orientation Location: Back   Hospital Pain Intervention(s) Medication (See MAR)     975mg Tylenol administered at 1121 for severe back pain.

## 2024-08-20 NOTE — PLAN OF CARE
Problem: Ineffective Coping  Goal: Demonstrates healthy coping skills  Outcome: Progressing  Goal: Participates in unit activities  Description: Interventions:  - Provide therapeutic environment   - Provide required programming   - Redirect inappropriate behaviors   Outcome: Progressing     Problem: Risk for Self Injury/Neglect  Goal: Verbalize thoughts and feelings  Description: Interventions:  - Assess and re-assess patient's lethality and potential for self-injury  - Engage patient in 1:1 interactions, daily, for a minimum of 15 minutes  - Encourage patient to express feelings, fears, frustrations, hopes  - Establish rapport/trust with patient   Interventions:  - Assess and re-assess patient's level of risk   - Engage patient in 1:1 interactions, daily, for a minimum of 15 minutes   - Encourage patient to express feelings, fears, frustrations, hopes   Outcome: Progressing     Problem: Depression  Goal: Verbalize thoughts and feelings  Description: Interventions:  - Assess and re-assess patient's lethality and potential for self-injury  - Engage patient in 1:1 interactions, daily, for a minimum of 15 minutes  - Encourage patient to express feelings, fears, frustrations, hopes  - Establish rapport/trust with patient   Interventions:  - Assess and re-assess patient's level of risk   - Engage patient in 1:1 interactions, daily, for a minimum of 15 minutes   - Encourage patient to express feelings, fears, frustrations, hopes   Outcome: Progressing  Goal: Refrain from harming self  Description: Interventions:  - Monitor patient closely, per order   - Supervise medication ingestion, monitor effects and side effects   Outcome: Progressing  Goal: Refrain from isolation  Description: Interventions:  - Develop a trusting relationship   - Encourage socialization   Outcome: Progressing     Problem: Anxiety  Goal: Anxiety is at manageable level  Description: Interventions:  - Assess and monitor patient's anxiety level.   -  Monitor for signs and symptoms (heart palpitations, chest pain, shortness of breath, headaches, nausea, feeling jumpy, restlessness, irritable, apprehensive).   - Collaborate with interdisciplinary team and initiate plan and interventions as ordered.  - [place] patient to unit/surroundings  - Explain treatment plan  - Encourage participation in care  - Encourage verbalization of concerns/fears  - Identify coping mechanisms  - Assist in developing anxiety-reducing skills  - Administer/offer alternative therapies  - Limit or eliminate stimulants  Outcome: Progressing      Detail Level: Detailed Detail Level: Zone

## 2024-08-20 NOTE — PROGRESS NOTES
"Progress Note - Leydi Khan 54 y.o. female MRN: [id number]    Unit/Bed#: -02 Encounter: [id number]        Subjective:   Patient seen and examined at bedside after reviewing the chart and discussing the case with the caring staff.      Patient examined at bedside.  Patient has no acute symptoms.     Physical Exam   Vitals: Blood pressure 133/71, pulse 94, temperature 97.9 °F (36.6 °C), temperature source Temporal, resp. rate 18, height 5' 4\" (1.626 m), weight (!) 136 kg (300 lb 6.4 oz), SpO2 95%.,Body mass index is 51.56 kg/m².  Constitutional: Patient in no acute distress.  HEENT: PERR, EOMI, MMM.  Cardiovascular: Normal rate and regular rhythm.  Pulmonary/Chest: Effort normal and breath sounds normal.  Abdomen: Soft, + BS, NT.    Assessment/Plan:  Leydi Khan is a(n) 54 y.o. year old female with MDD.    Cardiac with hx of HTN, HLD.  Continue lisinopril 20 mg daily, atorvastatin 10 mg daily, Lasix 20 mg daily.  Seizure disorder.  Patient is on Depakote ER 1250 mg at bedtime.  DJD/OA/chronic low back pain.  Tylenol, lidocaine patch daily and Voltaren gel.  GERD.  Patient is on Protonix 20 mg daily.  Prediabetes.  Hgb A1c 5.7% on 6/11/24.  Lifestyle modifications.  Neuropathy.  Continue gabapentin to 300 mg TID.  Vitamin D deficiency.  Patient started on vitamin D2 75911 units weekly x 8 weeks followed by vitamin D3 1000 units daily.  Vitamin B12 deficiency.  Not on supplement.  Intertrigo.  Involving undersurface of right breast, groin, sacrum.  Apply nystatin powder twice daily.  Dry skin.  Bilateral feet.  Apply ammonium lactate twice daily.  Dry eyes.  Started on artificial tear drops twice daily 8/9/24.  Vertigo.  Continue meclizine 25 mg daily.  Overgrown toenails.  Podiatry consulted.  Constipation.  Patient started on Senokot-S twice daily.  MiraLAX and lactulose as needed.  Dysuria.  UA 8/18 without evidence of UTI.  Start Pyridium 100 mg TID x 2 days 8/19.  Possible vaginal candidiasis.  Ordered " Diflucan 150 mg x 1 dose 8/19.    The patient was discussed with Dr. Jarquin and he is in agreement with the above note.

## 2024-08-20 NOTE — PROGRESS NOTES
08/20/24 1221   Pain Assessment Post Intervention   Pain Assessment Tool Used: 0-10   Post Intervention Pain Score 7   Post Intervention Pain Location/Orientation Location: Back   Response to Interventions Patient reports no relief     Medication ineffective.

## 2024-08-20 NOTE — NURSING NOTE
Patient was pleasant and cooperative. Patient social with staff and peers. Staff support provided.  Q 7 minute safety checks maintained. Patient denied SI/HI. Patient reported she was having a good day and felt safe. Patient compliant with medications and groups. Patient verbalized she will come to safe with any concerns. Staff will continue to monitor and support.

## 2024-08-20 NOTE — PROGRESS NOTES
Progress Note - Behavioral Health   Leydi Khan 54 y.o. female MRN: [id number]  Unit/Bed#: [facility] 200-02 Encounter: [id number]    Assessment & Plan   Principal Problem:    Schizoaffective disorder, depressive type (HCC)  Active Problems:    Primary hypertension    Hyperlipidemia    Class 3 severe obesity due to excess calories without serious comorbidity in adult (HCC)    PTSD (post-traumatic stress disorder)    Borderline personality disorder (HCC)    Bilateral leg edema      Behavior over the last 24 hours:  unchanged  Sleep: normal  Appetite: normal  Medication side effects: No  ROS: no complaints and all other systems are negative    Subjective: Leydi was seen today for psychiatric follow-up. Patient remains labile on the unit. She is visible on the unit, social with peers. Patient endorses anxiety and depression, she denied any passive suicidal thoughts. She is compliant with her current psychotropic medication regimen. She denied any SI/HI/AVH. She did not appear internally preoccupied.    Mental Status Evaluation:  Appearance:  disheveled   Behavior:  cooperative   Speech:  normal pitch and normal volume   Mood:  labile   Affect:  labile   Thought Process:  concrete   Associations: concrete associations   Thought Content:  Negative thinking, ruminative thoughts   Perceptual Disturbances: Denied AVH,did not appear internally preoccupied   Risk Potential: Suicidal Ideations none at present  Homicidal Ideations none at present  Potential for Aggression No   Sensorium:  person, place, and time/date   Memory:  recent and remote memory grossly intact   Consciousness:  alert and awake    Attention: attention span appeared shorter than expected for age   Insight:  limited   Judgment: limited   Gait/Station: normal gait/station   Motor Activity: no abnormal movements     Progress Toward Goals: Unchanged. Patient is compliant with her current psychotropic medication regimen. She denied side effects from current  psychotropic medication regimen. Will continue current psychotropic medication regimen. No discharge date at this time.    Recommended Treatment: Continue with group therapy, milieu therapy and occupational therapy.      Risks, benefits and possible side effects of Medications:   Risks, benefits, and possible side effects of medications explained to patient and patient verbalizes understanding.      Medications: all current active meds have been reviewed.    Labs: I have personally reviewed all pertinent laboratory/tests results. Most Recent Labs:   Lab Results   Component Value Date    WBC 6.93 08/07/2024    RBC 3.77 (L) 08/07/2024    HGB 12.0 08/07/2024    HCT 35.9 08/07/2024     08/07/2024    RDW 14.6 08/07/2024    NEUTROABS 4.34 04/20/2024    SODIUM 138 08/07/2024    K 4.4 08/07/2024    CL 98 08/07/2024    CO2 33 (H) 08/07/2024    BUN 10 08/07/2024    CREATININE 0.47 (L) 08/07/2024    GLUC 140 08/07/2024    GLUF 114 (H) 06/21/2024    CALCIUM 9.3 08/07/2024    AST 11 (L) 08/07/2024    ALT 17 08/07/2024    ALKPHOS 60 08/07/2024    TP 7.0 08/07/2024    ALB 3.9 08/07/2024    TBILI 0.49 08/07/2024    CHOLESTEROL 168 06/21/2024    HDL 32 (L) 06/21/2024    TRIG 156 (H) 06/21/2024    LDLCALC 105 (H) 06/21/2024    NONHDLC 136 06/21/2024    VALPROICTOT 56 08/12/2024    [id number] 2.179 08/07/2024    FREET4 0.65 06/11/2024    PREGSERUM Negative 04/28/2022    RPR Non-Reactive 12/13/2022    HGBA1C 5.7 (H) 06/11/2024     06/11/2024       Counseling / Coordination of Care  Total floor / unit time spent today 25 minutes.

## 2024-08-21 PROCEDURE — 99232 SBSQ HOSP IP/OBS MODERATE 35: CPT | Performed by: PSYCHIATRY & NEUROLOGY

## 2024-08-21 RX ADMIN — GABAPENTIN 300 MG: 300 CAPSULE ORAL at 08:18

## 2024-08-21 RX ADMIN — LISINOPRIL 20 MG: 20 TABLET ORAL at 08:18

## 2024-08-21 RX ADMIN — RISPERIDONE 1 MG: 1 TABLET, FILM COATED ORAL at 22:00

## 2024-08-21 RX ADMIN — PANTOPRAZOLE SODIUM 20 MG: 20 TABLET, DELAYED RELEASE ORAL at 05:44

## 2024-08-21 RX ADMIN — GABAPENTIN 300 MG: 300 CAPSULE ORAL at 17:07

## 2024-08-21 RX ADMIN — DIVALPROEX SODIUM 1250 MG: 500 TABLET, FILM COATED, EXTENDED RELEASE ORAL at 21:59

## 2024-08-21 RX ADMIN — SENNOSIDES AND DOCUSATE SODIUM 1 TABLET: 50; 8.6 TABLET ORAL at 08:18

## 2024-08-21 RX ADMIN — FUROSEMIDE 20 MG: 20 TABLET ORAL at 08:18

## 2024-08-21 RX ADMIN — CARIPRAZINE 3 MG: 3 CAPSULE, GELATIN COATED ORAL at 08:18

## 2024-08-21 RX ADMIN — MECLIZINE HYDROCHLORIDE 25 MG: 12.5 TABLET ORAL at 08:18

## 2024-08-21 RX ADMIN — TRAZODONE HYDROCHLORIDE 50 MG: 50 TABLET ORAL at 21:59

## 2024-08-21 RX ADMIN — PHENAZOPYRIDINE 100 MG: 100 TABLET ORAL at 08:18

## 2024-08-21 RX ADMIN — ACETAMINOPHEN 975 MG: 325 TABLET ORAL at 13:17

## 2024-08-21 RX ADMIN — ATORVASTATIN CALCIUM 10 MG: 10 TABLET, FILM COATED ORAL at 17:07

## 2024-08-21 RX ADMIN — GABAPENTIN 300 MG: 300 CAPSULE ORAL at 22:00

## 2024-08-21 RX ADMIN — PRAZOSIN HYDROCHLORIDE 1 MG: 1 CAPSULE ORAL at 21:59

## 2024-08-21 NOTE — NURSING NOTE
Patient has constricted affect; labile. Patient brightens on approach. Denies SI/HI or AVH, reports no anxiety or depression. Patient is visible on the unit and social with peers; attended group. Med complaint, cooperative with staff, makes needs know, and completing ADLs independently.    Patient reporting pain in back: Rated pain 9 on a 0-10 scale. Tylenol 975 mg was given at 1317. PRN was effective, patient reports no pain.     Will continue to monitor. Continual q7 min monitoring and safety checks in place.

## 2024-08-21 NOTE — PLAN OF CARE
Problem: Ineffective Coping  Goal: Participates in unit activities  Description: Interventions:  - Provide therapeutic environment   - Provide required programming   - Redirect inappropriate behaviors   Outcome: Progressing     Problem: Risk for Self Injury/Neglect  Goal: Attend and participate in unit activities, including therapeutic, recreational, and educational groups  Description: Interventions:  - Provide therapeutic and educational activities daily, encourage attendance and participation, and document same in the medical record  - Obtain collateral information, encourage visitation and family involvement in care   Interventions:  - Provide therapeutic and educational activities daily, encourage attendance and participation, and document same in the medical record   Outcome: Progressing     Problem: Depression  Goal: Attend and participate in unit activities, including therapeutic, recreational, and educational groups  Description: Interventions:  - Provide therapeutic and educational activities daily, encourage attendance and participation, and document same in the medical record  - Obtain collateral information, encourage visitation and family involvement in care   Interventions:  - Provide therapeutic and educational activities daily, encourage attendance and participation, and document same in the medical record   Outcome: Progressing   Patient has been attending groups and sharing appropriately.

## 2024-08-21 NOTE — NUTRITION
08/21/24 1433   Biochemical Data,Medical Tests, and Procedures   Biochemical Data/Medical Tests/Procedures Lab values reviewed;Meds reviewed   Labs (Comment) No new nutrition related labs   Meds (Comment) lipitor, cogentin, vraylar, Vit D, depakote, lasix, neurontin, atarax, zestril, ativan, zyprexa, antivert, protonix, minipress, senna, desyrel   Nutrition-Focused Physical Exam   Nutrition-Focused Physical Exam Findings RN skin assessment reviewed;No skin issues documented   Medical-Related Concerns PMH reviewed   Adequacy of Intake   Nutrition Modality PO   Feeding Route   PO Independent   Current PO Intake   Current Diet Order Regular, finger foods thin liquids   Current Meal Intake %   Estimated calorie intake compared to estimated need Nutrient needs met.   PES Statement   Problem Continue previous diagnosis   Recommendations/Interventions   Malnutrition/BMI Present Yes   Provider already documenting morbid obesity Yes   Adult BMI Classifications Morbid Obesity 50-59.9   Summary Regular, finger foods thin liquids. No utensils. Meal completions 100%. 8/17 300#, BMI=51.56; 1# weight gain from admission 8/7 299#. Weight stable. No new nutrition related labs. Medications reviewed. Missing teeth. +1 BLE edema. LBM 8/20. Depressed, emotional per flowsheets. Skin intact. Reports good appetite. Possible discharge to [facility] next week per notes.   Interventions/Recommendations Continue current diet order   Education Assessment   Education Patient/caregiver not appropriate for education at this time   Patient Nutrition Goals   Goal Avoid weight gain;Improve to healthful diet   Goal Status Met;Extended   Timeframe to complete goal by next f/u   Nutrition Complexity Risk   Nutrition complexity level Sign off   Follow up date 09/11/24

## 2024-08-21 NOTE — PROGRESS NOTES
"Progress Note - Leydi Khan 54 y.o. female MRN: [id number]    Unit/Bed#: -02 Encounter: [id number]        Subjective:   Patient seen and examined at bedside after reviewing the chart and discussing the case with the caring staff.      Patient examined at bedside.  Patient has no acute symptoms.     Physical Exam   Vitals: Blood pressure 124/59, pulse 79, temperature 98.1 °F (36.7 °C), temperature source Temporal, resp. rate 16, height 5' 4\" (1.626 m), weight (!) 136 kg (300 lb 6.4 oz), SpO2 94%.,Body mass index is 51.56 kg/m².  Constitutional: Patient in no acute distress.  HEENT: PERR, EOMI, MMM.  Cardiovascular: Normal rate and regular rhythm.  Pulmonary/Chest: Effort normal and breath sounds normal.  Abdomen: Soft, + BS, NT.    Assessment/Plan:  Leydi Khan is a(n) 54 y.o. year old female with MDD.    Cardiac with hx of HTN, HLD.  Continue lisinopril 20 mg daily, atorvastatin 10 mg daily, Lasix 20 mg daily.  Seizure disorder.  Patient is on Depakote ER 1250 mg at bedtime.  DJD/OA/chronic low back pain.  Tylenol, lidocaine patch daily and Voltaren gel.  GERD.  Patient is on Protonix 20 mg daily.  Prediabetes.  Hgb A1c 5.7% on 6/11/24.  Lifestyle modifications.  Neuropathy.  Continue gabapentin to 300 mg TID.  Vitamin D deficiency.  Patient started on vitamin D2 62118 units weekly x 8 weeks followed by vitamin D3 1000 units daily.  Vitamin B12 deficiency.  Not on supplement.  Intertrigo.  Involving undersurface of right breast, groin, sacrum.  Apply nystatin powder twice daily.  Dry skin.  Bilateral feet.  Apply ammonium lactate twice daily.  Dry eyes.  Started on artificial tear drops twice daily 8/9/24.  Vertigo.  Continue meclizine 25 mg daily.  Overgrown toenails.  Podiatry consulted.  Constipation.  Patient started on Senokot-S twice daily.  MiraLAX and lactulose as needed.  Dysuria.  UA 8/18 without evidence of UTI.  Start Pyridium 100 mg TID x 2 days 8/19.  Possible vaginal candidiasis.  Ordered " Diflucan 150 mg x 1 dose 8/19.    The patient was discussed with Dr. Jarquin and he is in agreement with the above note.

## 2024-08-21 NOTE — PROGRESS NOTES
Progress Note - Behavioral Health   Leydi Khan 54 y.o. female MRN: [id number]  Unit/Bed#: [facility] 200-02 Encounter: [id number]    Assessment & Plan   Principal Problem:    Schizoaffective disorder, depressive type (HCC)  Active Problems:    Primary hypertension    Hyperlipidemia    Class 3 severe obesity due to excess calories without serious comorbidity in adult (HCC)    PTSD (post-traumatic stress disorder)    Borderline personality disorder (HCC)    Bilateral leg edema      Behavior over the last 24 hours:  unchanged  Sleep: normal  Appetite: normal  Medication side effects: No  ROS: no complaints and all other systems are negative    Subjective: Leydi was seen today for psychiatric follow-up.  Patient is calm, cooperative.  She is visible on the unit social with peers.  According to nursing staff report patient has not had any recent self-injurious behaviors or thoughts of passive suicidal ideations.  Her mood is controlled, she however appears disheveled.  She is compliant with her current medication regimen.  She denied any SI/HI/AVH.  She did not appear internally preoccupied.    Mental Status Evaluation:  Appearance:  disheveled and overweight   Behavior:  Cooperative child like at times   Speech:  normal pitch and normal volume   Mood:  labile   Affect:  labile   Thought Process:  concrete   Associations: concrete associations   Thought Content:  Negative, ruminative thoughts   Perceptual Disturbances: Denied AVH,did not appear internally preoccupied   Risk Potential: Suicidal Ideations none at present  Homicidal Ideations none at present  Potential for Aggression No   Sensorium:  person, place, and time/date   Memory:  recent and remote memory grossly intact   Consciousness:  alert and awake    Attention: attention span appeared shorter than expected for age   Insight:  limited   Judgment: limited   Gait/Station: normal gait/station   Motor Activity: no abnormal movements     Progress Toward Goals: Unchanged.   Patient is controlled on the unit with no recent behavioral outburst.  She is compliant with her current psychotropic medication regimen.  She denies side effects on current psychotropic medication regimen.  Will continue current psychotropic medication regimen.  No discharge date at this time.    Recommended Treatment: Continue with group therapy, milieu therapy and occupational therapy.      Risks, benefits and possible side effects of Medications:   Risks, benefits, and possible side effects of medications explained to patient and patient verbalizes understanding.      Medications: all current active meds have been reviewed.    Labs: I have personally reviewed all pertinent laboratory/tests results. Most Recent Labs:   Lab Results   Component Value Date    WBC 6.93 08/07/2024    RBC 3.77 (L) 08/07/2024    HGB 12.0 08/07/2024    HCT 35.9 08/07/2024     08/07/2024    RDW 14.6 08/07/2024    NEUTROABS 4.34 04/20/2024    SODIUM 138 08/07/2024    K 4.4 08/07/2024    CL 98 08/07/2024    CO2 33 (H) 08/07/2024    BUN 10 08/07/2024    CREATININE 0.47 (L) 08/07/2024    GLUC 140 08/07/2024    GLUF 114 (H) 06/21/2024    CALCIUM 9.3 08/07/2024    AST 11 (L) 08/07/2024    ALT 17 08/07/2024    ALKPHOS 60 08/07/2024    TP 7.0 08/07/2024    ALB 3.9 08/07/2024    TBILI 0.49 08/07/2024    CHOLESTEROL 168 06/21/2024    HDL 32 (L) 06/21/2024    TRIG 156 (H) 06/21/2024    LDLCALC 105 (H) 06/21/2024    NONHDLC 136 06/21/2024    VALPROICTOT 56 08/12/2024    [id number] 2.179 08/07/2024    FREET4 0.65 06/11/2024    PREGSERUM Negative 04/28/2022    RPR Non-Reactive 12/13/2022    HGBA1C 5.7 (H) 06/11/2024     06/11/2024       Counseling / Coordination of Care  Total floor / unit time spent today 25 minutes.    denies pain/discomfort

## 2024-08-21 NOTE — NURSING NOTE
"Patient was observed to be visible in the community this evening.  She states her anxiety and depression \"are good\".  Denies SI, HI and hallucinations. Positive for snack and fluids tonight.  She has been  pleasant and calm, informing this writer that she \"had a good day today.\"  Leydi was medication compliant at ; she also requested and received PRN Tylenol 975 mg for c/o 10/10 right foot pain for which she obtained relief.  Continuous safety rounding in progress.   "

## 2024-08-21 NOTE — PROGRESS NOTES
" 08/21/24    Team Meeting   Meeting Type Daily Rounds   Team Members Present   Team Members Present Physician;;Nurse;Occupational Therapist   Physician Team Member Dr Marietta MORALES; Jann MCLEOD   Nursing Team Member Honorio VALDEZ   Social Work Team Member Valentin VALADEZ   OT Team Member Sunita DINERO   Patient/Family Present   Patient Present No   Patient's Family Present No     201, no dc date due to med mgmt-possible dc next week, dc to [facility]; anx/dep is \"good\", denies all psych symptoms, no behaviors yest, appears bright, belongings to return after 7 consistent days of progress due to safety concerns.   "

## 2024-08-22 PROCEDURE — 99232 SBSQ HOSP IP/OBS MODERATE 35: CPT

## 2024-08-22 RX ADMIN — ATORVASTATIN CALCIUM 10 MG: 10 TABLET, FILM COATED ORAL at 17:13

## 2024-08-22 RX ADMIN — GABAPENTIN 300 MG: 300 CAPSULE ORAL at 20:57

## 2024-08-22 RX ADMIN — MECLIZINE HYDROCHLORIDE 25 MG: 12.5 TABLET ORAL at 08:33

## 2024-08-22 RX ADMIN — LISINOPRIL 20 MG: 20 TABLET ORAL at 08:33

## 2024-08-22 RX ADMIN — GABAPENTIN 300 MG: 300 CAPSULE ORAL at 17:13

## 2024-08-22 RX ADMIN — DICLOFENAC SODIUM 2 G: 10 GEL TOPICAL at 13:47

## 2024-08-22 RX ADMIN — FUROSEMIDE 20 MG: 20 TABLET ORAL at 08:33

## 2024-08-22 RX ADMIN — GABAPENTIN 300 MG: 300 CAPSULE ORAL at 08:33

## 2024-08-22 RX ADMIN — PRAZOSIN HYDROCHLORIDE 1 MG: 1 CAPSULE ORAL at 20:58

## 2024-08-22 RX ADMIN — RISPERIDONE 1 MG: 1 TABLET, FILM COATED ORAL at 20:57

## 2024-08-22 RX ADMIN — PANTOPRAZOLE SODIUM 20 MG: 20 TABLET, DELAYED RELEASE ORAL at 05:42

## 2024-08-22 RX ADMIN — TRAZODONE HYDROCHLORIDE 50 MG: 50 TABLET ORAL at 20:58

## 2024-08-22 RX ADMIN — CARIPRAZINE 3 MG: 3 CAPSULE, GELATIN COATED ORAL at 08:33

## 2024-08-22 RX ADMIN — DIVALPROEX SODIUM 1250 MG: 500 TABLET, FILM COATED, EXTENDED RELEASE ORAL at 20:53

## 2024-08-22 RX ADMIN — SENNOSIDES AND DOCUSATE SODIUM 1 TABLET: 50; 8.6 TABLET ORAL at 17:13

## 2024-08-22 NOTE — NURSING NOTE
Assumed care of this patient from prior shift nurse at 2315.  Patient is currently in bed, appears to be sleeping.  No acute behaviors observed.  Continuous safety rounding in progress.

## 2024-08-22 NOTE — PROGRESS NOTES
Progress Note - Behavioral Health   Leydi Khan 54 y.o. female MRN: [id number]  Unit/Bed#: [facility] 200-02 Encounter: [id number]    Assessment & Plan   Principal Problem:    Schizoaffective disorder, depressive type (HCC)  Active Problems:    Primary hypertension    Hyperlipidemia    Class 3 severe obesity due to excess calories without serious comorbidity in adult (HCC)    PTSD (post-traumatic stress disorder)    Borderline personality disorder (HCC)    Bilateral leg edema      Behavior over the last 24 hours:  unchanged  Sleep: normal  Appetite: normal  Medication side effects: No  ROS: no complaints and all other systems are negative    Subjective: Leydi was seen today for psychiatric follow-up.  Patient continues to exhibit a controlled mood on the unit with no recent self injurious behaviors os passive suicidal ideations. Patient is visible on the unit, social with peers. Her mood is labile. She is compliant with her current medication regimen. She denied any SI/HI/AVH. She did not appear internally preoccupied.    Mental Status Evaluation:  Appearance:  age appropriate and overweight   Behavior:  Cooperative, child like at times   Speech:  normal pitch and normal volume   Mood:  labile   Affect:  labile   Thought Process:  concrete   Associations: concrete associations   Thought Content:  No overt delusions   Perceptual Disturbances: Denied AVH,did not appear internally preoccupied   Risk Potential: Suicidal Ideations none at present  Homicidal Ideations none at present  Potential for Aggression No   Sensorium:  person, place, and time/date   Memory:  recent and remote memory grossly intact   Consciousness:  alert and awake    Attention: attention span appeared shorter than expected for age   Insight:  limited   Judgment: limited   Gait/Station: normal gait/station   Motor Activity: no abnormal movements     Progress Toward Goals: Unchanged.  Patient is controlled on the unit. She is compliant with her current  psychotropic medication regimen. She denied side effects from current psychotropic medication regimen. Will continue current psychotropic medication regimen. No discharge date at this time.    Recommended Treatment: Continue with group therapy, milieu therapy and occupational therapy.      Risks, benefits and possible side effects of Medications:   Risks, benefits, and possible side effects of medications explained to patient and patient verbalizes understanding.      Medications: all current active meds have been reviewed.    Labs: I have personally reviewed all pertinent laboratory/tests results. Most Recent Labs:   Lab Results   Component Value Date    WBC 6.93 08/07/2024    RBC 3.77 (L) 08/07/2024    HGB 12.0 08/07/2024    HCT 35.9 08/07/2024     08/07/2024    RDW 14.6 08/07/2024    NEUTROABS 4.34 04/20/2024    SODIUM 138 08/07/2024    K 4.4 08/07/2024    CL 98 08/07/2024    CO2 33 (H) 08/07/2024    BUN 10 08/07/2024    CREATININE 0.47 (L) 08/07/2024    GLUC 140 08/07/2024    GLUF 114 (H) 06/21/2024    CALCIUM 9.3 08/07/2024    AST 11 (L) 08/07/2024    ALT 17 08/07/2024    ALKPHOS 60 08/07/2024    TP 7.0 08/07/2024    ALB 3.9 08/07/2024    TBILI 0.49 08/07/2024    CHOLESTEROL 168 06/21/2024    HDL 32 (L) 06/21/2024    TRIG 156 (H) 06/21/2024    LDLCALC 105 (H) 06/21/2024    NONHDLC 136 06/21/2024    VALPROICTOT 56 08/12/2024    JVX1HBNNDHFE 2.179 08/07/2024    FREET4 0.65 06/11/2024    PREGSERUM Negative 04/28/2022    RPR Non-Reactive 12/13/2022    HGBA1C 5.7 (H) 06/11/2024     06/11/2024       Counseling / Coordination of Care  Total floor / unit time spent today 25 minutes.

## 2024-08-22 NOTE — NURSING NOTE
Pt visible on unit and social with select peers. Pt stated her depression was high and denies anxiety, SI/HI/AVH. Continuous monitoring maintained.

## 2024-08-22 NOTE — PROGRESS NOTES
08/22/24   Team Meeting   Meeting Type Daily Rounds   Team Members Present   Team Members Present Physician;Nurse;;Occupational Therapist   Physician Team Member Dr Marietta MORALES   Nursing Team Member Honorio RN   Social Work Team Member Valentin VALADEZ   OT Team Member Sunita CTRS   Patient/Family Present   Patient Present No   Patient's Family Present No     201, labile, wants sheets back, resistant to meds from certain nurse, denies all psych meds today, bright, no dc due to med mgmt, attention seeking.

## 2024-08-22 NOTE — NURSING NOTE
Patient was cooperative and pleasant this morning. She is medication compliant and is not presenting with any acute behaviors. Patient denies SI,HI and AVH with no depression or anxiety. Patient still remains labile but will brighten upon approach and is social with peers. Continuous q7 safety checks in place, will continue to monitor.

## 2024-08-22 NOTE — NURSING NOTE
Pt visible on unit and social with select peers. Pt affect is constricted and brightens with approach. Pt denies depression, anxiety, SI/HI/AVH. Pt states she had a good day today. Pt endorsed severe back pain that was not controlled with tylenol. Continuous monitoring maintained

## 2024-08-22 NOTE — SOCIAL WORK
met with patient to discuss CM role, discuss status, and to answer questions/concerns.     Patient was seen in day room eating lunch with another patient. Patient stated she was having a great day. Patient was calm, cooperative, and in behavioral control.    Patient had no questions or concerns at this time.

## 2024-08-22 NOTE — PROGRESS NOTES
"Progress Note - Leydi Khan 54 y.o. female MRN: [id number]    Unit/Bed#: -02 Encounter: [id number]        Subjective:   Patient seen and examined at bedside after reviewing the chart and discussing the case with the caring staff.      Patient examined at bedside.  Patient has no acute symptoms.     Physical Exam   Vitals: Blood pressure 121/60, pulse 84, temperature 97.6 °F (36.4 °C), temperature source Temporal, resp. rate 18, height 5' 4\" (1.626 m), weight (!) 136 kg (300 lb 6.4 oz), SpO2 93%.,Body mass index is 51.56 kg/m².  Constitutional: Patient in no acute distress.  HEENT: PERR, EOMI, MMM.  Cardiovascular: Normal rate and regular rhythm.  Pulmonary/Chest: Effort normal and breath sounds normal.  Abdomen: Soft, + BS, NT.    Assessment/Plan:  Leydi Khan is a(n) 54 y.o. year old female with MDD.    Cardiac with hx of HTN, HLD.  Continue lisinopril 20 mg daily, atorvastatin 10 mg daily, Lasix 20 mg daily.  Seizure disorder.  Patient is on Depakote ER 1250 mg at bedtime.  DJD/OA/chronic low back pain.  Tylenol, lidocaine patch daily and Voltaren gel.  GERD.  Patient is on Protonix 20 mg daily.  Prediabetes.  Hgb A1c 5.7% on 6/11/24.  Lifestyle modifications.  Neuropathy.  Continue gabapentin to 300 mg TID.  Vitamin D deficiency.  Patient started on vitamin D2 97614 units weekly x 8 weeks followed by vitamin D3 1000 units daily.  Vitamin B12 deficiency.  Not on supplement.  Intertrigo.  Involving undersurface of right breast, groin, sacrum.  Apply nystatin powder twice daily.  Dry skin.  Bilateral feet.  Apply ammonium lactate twice daily.  Dry eyes.  Started on artificial tear drops twice daily 8/9/24.  Vertigo.  Continue meclizine 25 mg daily.  Overgrown toenails.  Podiatry consulted.  Constipation.  Patient started on Senokot-S twice daily.  MiraLAX and lactulose as needed.  Dysuria.  UA 8/18 without evidence of UTI.  Start Pyridium 100 mg TID x 2 days 8/19.  Possible vaginal candidiasis.  Ordered " Diflucan 150 mg x 1 dose 8/19.    The patient was discussed with Dr. Jarquin and he is in agreement with the above note.

## 2024-08-23 PROCEDURE — 99232 SBSQ HOSP IP/OBS MODERATE 35: CPT

## 2024-08-23 RX ADMIN — PRAZOSIN HYDROCHLORIDE 1 MG: 1 CAPSULE ORAL at 21:03

## 2024-08-23 RX ADMIN — NYSTATIN 1 APPLICATION: 100000 POWDER TOPICAL at 08:42

## 2024-08-23 RX ADMIN — PANTOPRAZOLE SODIUM 20 MG: 20 TABLET, DELAYED RELEASE ORAL at 05:44

## 2024-08-23 RX ADMIN — Medication 1 APPLICATION: at 08:55

## 2024-08-23 RX ADMIN — Medication: at 21:05

## 2024-08-23 RX ADMIN — GABAPENTIN 300 MG: 300 CAPSULE ORAL at 15:52

## 2024-08-23 RX ADMIN — DIVALPROEX SODIUM 1250 MG: 500 TABLET, FILM COATED, EXTENDED RELEASE ORAL at 21:02

## 2024-08-23 RX ADMIN — GABAPENTIN 300 MG: 300 CAPSULE ORAL at 21:03

## 2024-08-23 RX ADMIN — CARIPRAZINE 3 MG: 3 CAPSULE, GELATIN COATED ORAL at 08:42

## 2024-08-23 RX ADMIN — TRAZODONE HYDROCHLORIDE 50 MG: 50 TABLET ORAL at 21:03

## 2024-08-23 RX ADMIN — MECLIZINE HYDROCHLORIDE 25 MG: 12.5 TABLET ORAL at 08:40

## 2024-08-23 RX ADMIN — GABAPENTIN 300 MG: 300 CAPSULE ORAL at 08:40

## 2024-08-23 RX ADMIN — ATORVASTATIN CALCIUM 10 MG: 10 TABLET, FILM COATED ORAL at 15:52

## 2024-08-23 RX ADMIN — RISPERIDONE 1 MG: 1 TABLET, FILM COATED ORAL at 21:03

## 2024-08-23 RX ADMIN — DICLOFENAC SODIUM 2 G: 10 GEL TOPICAL at 21:06

## 2024-08-23 RX ADMIN — NYSTATIN: 100000 POWDER TOPICAL at 15:53

## 2024-08-23 NOTE — PROGRESS NOTES
Progress Note - Behavioral Health   Leydi Khan 54 y.o. female MRN: [id number]  Unit/Bed#: [facility] 200-02 Encounter: [id number]    Assessment & Plan   Principal Problem:    Schizoaffective disorder, depressive type (HCC)  Active Problems:    Primary hypertension    Hyperlipidemia    Class 3 severe obesity due to excess calories without serious comorbidity in adult (HCC)    PTSD (post-traumatic stress disorder)    Borderline personality disorder (HCC)    Bilateral leg edema      Behavior over the last 24 hours:  unchanged  Sleep: normal  Appetite: normal  Medication side effects: No  ROS: no complaints and all other systems are negative    Subjective: Leydi was seen today for psychiatric follow-up.  Patient is calm, cooperative.  She is visible on the unit, social with peers.  Her mood is controlled with no recent self-injurious behaviors.  Patient can be childlike at times, she is easily redirectable.  She is compliant with her current medication regimen.  She denied any SI/HI/AVH.  She did not appear internally preoccupied.    Mental Status Evaluation:  Appearance:  age appropriate and overweight   Behavior:  Cooperative   Speech:  normal pitch and normal volume   Mood:  Less labile   Affect:  mood-congruent   Thought Process:  concrete   Associations: concrete associations   Thought Content:  No overt delusions   Perceptual Disturbances: Denied AVH,did not appear internally preoccupied   Risk Potential: Suicidal Ideations none at present  Homicidal Ideations none at present  Potential for Aggression No   Sensorium:  person, place, and time/date   Memory:  recent and remote memory grossly intact   Consciousness:  alert and awake    Attention: attention span appeared shorter than expected for age   Insight:  limited   Judgment: limited   Gait/Station: Seated in a chair   Motor Activity: no abnormal movements     Progress Toward Goals: Unchanged.  Patient is less labile on the unit. Her mood is controlled with no recent  self injurious behaviors. She is compliant with her current psychotropic medication regimen. She denied side effects from current psychotropic medication regimen. No discharge date at this time.    Recommended Treatment: Continue with group therapy, milieu therapy and occupational therapy.      Risks, benefits and possible side effects of Medications:   Risks, benefits, and possible side effects of medications explained to patient and patient verbalizes understanding.      Medications: all current active meds have been reviewed.    Labs: I have personally reviewed all pertinent laboratory/tests results. Most Recent Labs:   Lab Results   Component Value Date    WBC 6.93 08/07/2024    RBC 3.77 (L) 08/07/2024    HGB 12.0 08/07/2024    HCT 35.9 08/07/2024     08/07/2024    RDW 14.6 08/07/2024    NEUTROABS 4.34 04/20/2024    SODIUM 138 08/07/2024    K 4.4 08/07/2024    CL 98 08/07/2024    CO2 33 (H) 08/07/2024    BUN 10 08/07/2024    CREATININE 0.47 (L) 08/07/2024    GLUC 140 08/07/2024    GLUF 114 (H) 06/21/2024    CALCIUM 9.3 08/07/2024    AST 11 (L) 08/07/2024    ALT 17 08/07/2024    ALKPHOS 60 08/07/2024    TP 7.0 08/07/2024    ALB 3.9 08/07/2024    TBILI 0.49 08/07/2024    CHOLESTEROL 168 06/21/2024    HDL 32 (L) 06/21/2024    TRIG 156 (H) 06/21/2024    LDLCALC 105 (H) 06/21/2024    NONHDLC 136 06/21/2024    VALPROICTOT 56 08/12/2024    QJC5JPWOFBXA 2.179 08/07/2024    FREET4 0.65 06/11/2024    PREGSERUM Negative 04/28/2022    RPR Non-Reactive 12/13/2022    HGBA1C 5.7 (H) 06/11/2024     06/11/2024       Counseling / Coordination of Care  Total floor / unit time spent today 25 minutes.

## 2024-08-23 NOTE — PROGRESS NOTES
"Progress Note - Leydi Khan 54 y.o. female MRN: [id number]    Unit/Bed#: [facility] 200-02 Encounter: [id number]        Subjective:   Patient seen and examined at bedside after reviewing the chart and discussing the case with the caring staff.      Patient examined at bedside.  Patient has no acute symptoms.     Physical Exam   Vitals: Blood pressure 109/58, pulse 84, temperature 97.5 °F (36.4 °C), temperature source Temporal, resp. rate 16, height 5' 4\" (1.626 m), weight (!) 136 kg (300 lb 6.4 oz), SpO2 93%.,Body mass index is 51.56 kg/m².  Constitutional: Patient in no acute distress.  HEENT: PERR, EOMI, MMM.  Cardiovascular: Normal rate and regular rhythm.  Pulmonary/Chest: Effort normal and breath sounds normal.  Abdomen: Soft, + BS, NT.    Assessment/Plan:  Leydi Khan is a(n) 54 y.o. year old female with MDD.    Cardiac with hx of HTN, HLD.  Continue lisinopril 20 mg daily, atorvastatin 10 mg daily, Lasix 20 mg daily.  Seizure disorder.  Patient is on Depakote ER 1250 mg at bedtime.  DJD/OA/chronic low back pain.  Tylenol, lidocaine patch daily and Voltaren gel.  GERD.  Patient is on Protonix 20 mg daily.  Prediabetes.  Hgb A1c 5.7% on 6/11/24.  Lifestyle modifications.  Neuropathy.  Continue gabapentin to 300 mg TID.  Vitamin D deficiency.  Patient started on vitamin D2 87612 units weekly x 8 weeks followed by vitamin D3 1000 units daily.  Vitamin B12 deficiency.  Not on supplement.  Intertrigo.  Involving undersurface of right breast, groin, sacrum.  Apply nystatin powder twice daily.  Dry skin.  Bilateral feet.  Apply ammonium lactate twice daily.  Dry eyes.  Started on artificial tear drops twice daily 8/9/24.  Vertigo.  Continue meclizine 25 mg daily.  Overgrown toenails.  Podiatry consulted.  Constipation.  Patient started on Senokot-S twice daily.  MiraLAX and lactulose as needed.  Dysuria.  UA 8/18 without evidence of UTI.  Completed Pyridium 100 mg TID x 2 days (thru 8/21).  Possible vaginal candidiasis.  " Resolved after Diflucan 150 mg x 1 dose 8/19.    The patient was discussed with Dr. Jarquin and he is in agreement with the above note.

## 2024-08-23 NOTE — DISCHARGE INSTR - OTHER ORDERS
You are being discharged to  [facility] located at [address] [facility] [address]. Patient phone: [phone].    Triggers you have identified during your hospitalization that led to your admission of a distressed mood include ineffective coping skills and unstable mental health. Coping skills you have identified during your hospitalization include word searches, listen to music, shopping, coloring, doing nails, dancing. If you are unable to deal with your distressed mood alone please contact Sandhya Karina (Aunt) [phone] or [facility] staff at [phone]. If that is not effective and you continue to have a distressed mood, are overwhelmed, or in crisis, please contact (Crisis #) [place] and [facility]  or [place] [phone], dial 911 or go to the nearest emergency center.        [facility] [phone]   [facility] [phone]   *National Suicide Prevention Lifeline:  1-960.465.9048  *Alcohol Anonymous: 771.508.4099  *[facility] Drug and Alcohol Commission: 429.592.2292  *National [place] on Mental Illness (MONIKA) HELPLINE: 163.739.7829/Website: www.monika.org  *Substance Abuse and Mental Health Services Administration([facility]) National Helpline, which is a confidential, free, 24-hour-a-day, 365-day-a-year, information service for individuals and family members facing mental health and/or substance use disorders. This service provides referrals to local treatment facilities, support groups, and community-based organizations. Callers can also order free publications and other information.  Call 1-685.746.1670/Website: www.[facility].gov  *United Way 2-1-1: This is a toll free, confidential, 24-hour-a-day service which connects you to a community  in your area who can help you find services and resources that are available to you locally and provide critical services that can improve and save lives.  Call: 211  /Website: http://www.[employer].org/        Franca, or Lilliana, our Behavioral Health Nurse Navigators, will be calling you after your discharge, on the phone number that you provided.  They will be available as an additional support, if needed.   If you wish to speak with one of them, you may contact Franca at [phone] or Lilliana at [phone].  You are being discharged to

## 2024-08-23 NOTE — PROGRESS NOTES
08/23/24   Team Meeting   Meeting Type Daily Rounds   Team Members Present   Team Members Present Physician;;Nurse;Occupational Therapist   Physician Team Member Dr Marietta MORALES; Gabrielle MCLEOD   Nursing Team Member Honorio VALDEZ   Social Work Team Member Valentin VALADEZ   OT Team Member Sunita DINERO   Patient/Family Present   Patient Present No   Patient's Family Present No     201, no dc due to med mgmt; became depressed stating she wanted to hurt herself, attention seeking, upset over not having a certain staff member, staff splitting, moderate dep, low anx, poor sleep due to AH, better mood today.

## 2024-08-23 NOTE — PLAN OF CARE
Problem: Ineffective Coping  Goal: Participates in unit activities  Description: Interventions:  - Provide therapeutic environment   - Provide required programming   - Redirect inappropriate behaviors   Outcome: Progressing     Problem: Ineffective Coping  Goal: Understands least restrictive measures  Description: Interventions:  - Utilize least restrictive behavior  Outcome: Progressing     Problem: Ineffective Coping  Goal: Free from restraint events  Description: - Utilize least restrictive measures   - Provide behavioral interventions   - Redirect inappropriate behaviors   Outcome: Progressing     Problem: Risk for Self Injury/Neglect  Goal: Treatment Goal: Remain safe during length of stay, learn and adopt new coping skills, and be free of self-injurious ideation, impulses and acts at the time of discharge  Outcome: Progressing

## 2024-08-23 NOTE — NURSING NOTE
"Pt is pleasant & socializes with other patients. Pt c/o moderate depression & mild anxiety. Pt denies any hallucinations, suicidal or homicidal ideations. Pt states \" I feel safe today\". Q 7 min checks maintained to monitor pt's behavior & safety. Pt up ad saima & gait is steady. Pt is cooperative & compliant with medications.  "

## 2024-08-23 NOTE — NURSING NOTE
"Patient observed sitting by self in [place] with table coloring. Patient became increasing irritated when she did not get the nurse she wanted at change of shift. Patient wanted to sit closer to nurse's desk  and due to privacy she was not allowed. Patient  stated \"Fine, I guess I will be refusing my medication\". Patient did take medication from the nurse she wanted. Endorses severe anxiety and depression along with S/T. Writer believes all behavior due to not getting what she wanted. Ate snack in [place]. Q 7 continuous monitoring maintained.  "

## 2024-08-23 NOTE — NURSING NOTE
Presents with baseline affect,brightens upon approach:endorses moderate depression,mild anxiety;denies SI,HI,AH,VH.Leydi is visible on the unit,converses freely,makes needs known,is compliant with medications,unit rules,appetite is good,is independent with ambulation and ADLs,responds well to positive reinforcement.We discussed ways to increase coping skills.Will continue to educate,monitor,and provide safe,therapeutic milieu.   PAST MEDICAL HISTORY:  No pertinent past medical history

## 2024-08-24 PROCEDURE — 99232 SBSQ HOSP IP/OBS MODERATE 35: CPT | Performed by: PHYSICIAN ASSISTANT

## 2024-08-24 RX ADMIN — NYSTATIN 1 APPLICATION: 100000 POWDER TOPICAL at 21:01

## 2024-08-24 RX ADMIN — SENNOSIDES AND DOCUSATE SODIUM 1 TABLET: 50; 8.6 TABLET ORAL at 09:11

## 2024-08-24 RX ADMIN — MECLIZINE HYDROCHLORIDE 25 MG: 12.5 TABLET ORAL at 09:10

## 2024-08-24 RX ADMIN — Medication: at 09:11

## 2024-08-24 RX ADMIN — GABAPENTIN 300 MG: 300 CAPSULE ORAL at 21:00

## 2024-08-24 RX ADMIN — FUROSEMIDE 20 MG: 20 TABLET ORAL at 09:11

## 2024-08-24 RX ADMIN — TRAZODONE HYDROCHLORIDE 50 MG: 50 TABLET ORAL at 21:00

## 2024-08-24 RX ADMIN — NYSTATIN: 100000 POWDER TOPICAL at 09:11

## 2024-08-24 RX ADMIN — DIVALPROEX SODIUM 1250 MG: 500 TABLET, FILM COATED, EXTENDED RELEASE ORAL at 21:00

## 2024-08-24 RX ADMIN — PRAZOSIN HYDROCHLORIDE 1 MG: 1 CAPSULE ORAL at 21:00

## 2024-08-24 RX ADMIN — ATORVASTATIN CALCIUM 10 MG: 10 TABLET, FILM COATED ORAL at 17:06

## 2024-08-24 RX ADMIN — LISINOPRIL 20 MG: 20 TABLET ORAL at 09:10

## 2024-08-24 RX ADMIN — PANTOPRAZOLE SODIUM 20 MG: 20 TABLET, DELAYED RELEASE ORAL at 06:17

## 2024-08-24 RX ADMIN — ERGOCALCIFEROL 50000 UNITS: 1.25 CAPSULE, LIQUID FILLED ORAL at 09:11

## 2024-08-24 RX ADMIN — GABAPENTIN 300 MG: 300 CAPSULE ORAL at 09:11

## 2024-08-24 RX ADMIN — NYSTATIN: 100000 POWDER TOPICAL at 17:08

## 2024-08-24 RX ADMIN — GLYCERIN 2 DROP: .002; .002; .01 SOLUTION/ DROPS OPHTHALMIC at 09:11

## 2024-08-24 RX ADMIN — CARIPRAZINE 3 MG: 3 CAPSULE, GELATIN COATED ORAL at 09:11

## 2024-08-24 RX ADMIN — RISPERIDONE 1 MG: 1 TABLET, FILM COATED ORAL at 21:00

## 2024-08-24 RX ADMIN — GABAPENTIN 300 MG: 300 CAPSULE ORAL at 17:06

## 2024-08-24 NOTE — PLAN OF CARE
Problem: Ineffective Coping  Goal: Demonstrates healthy coping skills  Outcome: Progressing  Goal: Participates in unit activities  Description: Interventions:  - Provide therapeutic environment   - Provide required programming   - Redirect inappropriate behaviors   Outcome: Progressing     Problem: Risk for Self Injury/Neglect  Goal: Verbalize thoughts and feelings  Description: Interventions:  - Assess and re-assess patient's lethality and potential for self-injury  - Engage patient in 1:1 interactions, daily, for a minimum of 15 minutes  - Encourage patient to express feelings, fears, frustrations, hopes  - Establish rapport/trust with patient   Interventions:  - Assess and re-assess patient's level of risk   - Engage patient in 1:1 interactions, daily, for a minimum of 15 minutes   - Encourage patient to express feelings, fears, frustrations, hopes   Outcome: Progressing     Problem: Depression  Goal: Verbalize thoughts and feelings  Description: Interventions:  - Assess and re-assess patient's lethality and potential for self-injury  - Engage patient in 1:1 interactions, daily, for a minimum of 15 minutes  - Encourage patient to express feelings, fears, frustrations, hopes  - Establish rapport/trust with patient   Interventions:  - Assess and re-assess patient's level of risk   - Engage patient in 1:1 interactions, daily, for a minimum of 15 minutes   - Encourage patient to express feelings, fears, frustrations, hopes   Outcome: Progressing  Goal: Refrain from harming self  Description: Interventions:  - Monitor patient closely, per order   - Supervise medication ingestion, monitor effects and side effects   Outcome: Progressing     Problem: Anxiety  Goal: Anxiety is at manageable level  Description: Interventions:  - Assess and monitor patient's anxiety level.   - Monitor for signs and symptoms (heart palpitations, chest pain, shortness of breath, headaches, nausea, feeling jumpy, restlessness, irritable,  apprehensive).   - Collaborate with interdisciplinary team and initiate plan and interventions as ordered.  - [place] patient to unit/surroundings  - Explain treatment plan  - Encourage participation in care  - Encourage verbalization of concerns/fears  - Identify coping mechanisms  - Assist in developing anxiety-reducing skills  - Administer/offer alternative therapies  - Limit or eliminate stimulants  Outcome: Progressing

## 2024-08-24 NOTE — PROGRESS NOTES
"Progress Note - Behavioral Health     Leydi Khan 54 y.o. female MRN: [id number]   Unit/Bed#: [facility] 200-02 Encounter: [id number]  This note was not shared with the patient due to reasonable likelihood of causing patient harm    Behavior over the last 24 hours: unchanged.     Chart reviewed.  Leydi seen in her room; declines to come to office. States her mood is good, although she misses her boyfriend.  Says at times she feels \"shaky inside, like a bowl of jelly\" in response to anxiety.  States she gets anxious when she thinks that \"people don't care about me\". Sleep is good.     Sleep:  adequate  Appetite:  good  Medication side effects: denies   ROS: reports bladder \"pressure\" -will discuss with Dr Jarquin today.  Denies dysuria.  Denies abdominal pain, nausea, vomiting, dizziness.    Mental Status Evaluation:    Appearance:  Age appropriate   Behavior:  pleasant, cooperative   Speech:  normal rate and volume   Mood:  mildly anxious   Affect:  mood-congruent   Thought Process:  goal directed   Associations: concrete associations   Thought Content:  no overt delusions, cognitive distortions   Perceptual Disturbances: not observed   Risk Potential: Suicidal ideation - None at present  Homicidal ideation - None at present   Sensorium:  oriented to person, place, and time/date   Memory:  recent and remote memory grossly intact   Consciousness:  alert and awake   Attention: attention span and concentration are age appropriate   Insight:  limited   Judgment: limited   Gait/Station: in bed   Motor Activity: no abnormal movements     Vital signs in last 24 hours:    Temp:  [97.4 °F (36.3 °C)-97.7 °F (36.5 °C)] 97.4 °F (36.3 °C)  HR:  [71-82] 71  Resp:  [16-18] 16  BP: (117-126)/(64-73) 117/73    Laboratory results: I have personally reviewed all pertinent laboratory/tests results.        Assessment & Plan   Principal Problem:    Schizoaffective disorder, depressive type (HCC)  Active Problems:    Primary hypertension    " Hyperlipidemia    Class 3 severe obesity due to excess calories without serious comorbidity in adult (HCC)    PTSD (post-traumatic stress disorder)    Borderline personality disorder (HCC)    Bilateral leg edema    Recommended Treatment: cont present treatment    Planned medication and treatment changes: no med change: cont vraylar 3 mg/d, depakote er 1250 mg q bedtime, neurontin 300 mg tid, prazosin 1 mg q bedtime, risperdal 1 mg q bedtime, trazodone 50 mg q bedtime    All current active medications have been reviewed  Encourage group therapy, milieu therapy and occupational therapy  Behavioral Health checks every 7 minutes  Current Facility-Administered Medications   Medication Dose Route Frequency Provider Last Rate    acetaminophen  650 mg Oral Q4H PRN Aditya Farias, CRNP      acetaminophen  650 mg Oral Q4H PRN Aditya Farias, SHANI      acetaminophen  975 mg Oral Q6H PRN Aditya Farias, SHANI      aluminum-magnesium hydroxide-simethicone  30 mL Oral Q4H PRN Sarah L Dagtamir PA-C      ammonium lactate   Topical BID Sarah L Woody PA-C      Artificial Tears  2 drop Both Eyes BID Sarah Mckay PA-C      atorvastatin  10 mg Oral Daily With Dinner SHANI Hawk      benztropine  0.5 mg Oral Q4H PRN Max 6/day SHANI Hawk      cariprazine  3 mg Oral Daily SHANI Hawk      [START ON 9/29/2024] Cholecalciferol  1,000 Units Oral Daily Sarah L Dagtamir PA-C      Diclofenac Sodium  2 g Topical 4x Daily PRN JAMSYN Tsang-IKER      divalproex sodium  1,250 mg Oral HS SHANI Hawk      ergocalciferol  50,000 Units Oral Weekly Sarah L Woody PA-IKER      furosemide  20 mg Oral Daily Aditya Farias, SHANI      gabapentin  300 mg Oral TID Renato Mcmanus MD      hydrOXYzine HCL  25 mg Oral Q6H PRN Max 4/day Aditya Farias, SHANI      lactulose  20 g Oral BID PRN JASMYN Tsang-IKER      lisinopril  20 mg Oral Daily SHANI Hawk       LORazepam  1 mg Intramuscular Q6H PRN Max 3/day [facility], CRNP      LORazepam  0.5 mg Oral Q6H PRN Max 4/day [facility], CRNP      LORazepam  1 mg Oral Q6H PRN Max 3/day [facility], CRNP      meclizine  25 mg Oral Daily Sarah Mckay PA-C      nystatin   Topical TID Sarah Mckay PA-C      OLANZapine  5 mg Intramuscular Q3H PRN Max 3/day [facility], CRNP      OLANZapine  2.5 mg Oral Q4H PRN Max 6/day [facility], CRNP      OLANZapine  5 mg Oral Q4H PRN Max 3/day [facility], CRNP      OLANZapine  5 mg Oral Q3H PRN Max 3/day [facility], CRNP      pantoprazole  20 mg Oral Early Morning Sarah Mckay PA-C      polyethylene glycol  17 g Oral Daily PRN Sarah Mckay PA-C      prazosin  1 mg Oral HS Renato Mcmanus MD      risperiDONE  1 mg Oral HS [facility], CRNP      senna-docusate sodium  1 tablet Oral BID Sarah Mckay PA-C      traZODone  50 mg Oral HS [facility], CRNP         Risks / Benefits of Treatment:    Risks, benefits, and possible side effects of medications explained to patient and patient verbalizes understanding and agreement for treatment.    Counseling / Coordination of Care:    Total floor / unit time spent today 15 minutes. Greater than 50% of total time was spent with the patient and / or family counseling and / or coordination of care. A description of counseling / coordination of care:  Patient's progress discussed with staff in treatment team meeting.  Medications, treatment progress and treatment plan reviewed with patient.    Ena Wayne PA-C 08/24/24

## 2024-08-24 NOTE — NURSING NOTE
Since assumption of care at 2315, patient appears to have slept through the overnight hours without issue.  No complaints voiced.  No acute behaviors observed. Patient remains in bed sleeping at this time.  Continuous safety rounding in progress.

## 2024-08-24 NOTE — NURSING NOTE
Patient is visible in milieu for meals and group. Pleasant upon interaction. Denies anxiety and depression. Denies SI/HI/AVH. Patient states feeling great this morning. Patient is compliant with medications. No acute behaviors noted. Remains in paper clothes as agreed in treatment team. Crayons given to patient upon request. Patient complains of toenail curling under and causing pain. Medical aware. Q 7 min safety checks maintained. Fall precautions maintained.

## 2024-08-24 NOTE — PROGRESS NOTES
"Progress Note - Leydi Khan 54 y.o. female MRN: [id number]    Unit/Bed#: [facility] 200-02 Encounter: [id number]        Subjective:   Patient seen and examined at bedside after reviewing the chart and discussing the case with the caring staff.      Patient examined at bedside.  Patient has no acute symptoms.     Physical Exam   Vitals: Blood pressure 111/54, pulse 78, temperature 97.9 °F (36.6 °C), temperature source Temporal, resp. rate 16, height 5' 4\" (1.626 m), weight (!) 136 kg (300 lb 6.4 oz), SpO2 96%.,Body mass index is 51.56 kg/m².  Constitutional: Patient in no acute distress.  HEENT: PERR, EOMI, MMM.  Cardiovascular: Normal rate and regular rhythm.  Pulmonary/Chest: Effort normal and breath sounds normal.  Abdomen: Soft, + BS, NT.    Assessment/Plan:  Leydi Khan is a(n) 54 y.o. year old female with MDD.    Cardiac with hx of HTN, HLD.  Continue lisinopril 20 mg daily, atorvastatin 10 mg daily, Lasix 20 mg daily.  Seizure disorder.  Patient is on Depakote ER 1250 mg at bedtime.  DJD/OA/chronic low back pain.  Tylenol, lidocaine patch daily and Voltaren gel.  GERD.  Patient is on Protonix 20 mg daily.  Prediabetes.  Hgb A1c 5.7% on 6/11/24.  Lifestyle modifications.  Neuropathy.  Continue gabapentin to 300 mg TID.  Vitamin D deficiency.  Patient started on vitamin D2 63350 units weekly x 8 weeks followed by vitamin D3 1000 units daily.  Vitamin B12 deficiency.  Not on supplement.  Intertrigo.  Involving undersurface of right breast, groin, sacrum.  Apply nystatin powder twice daily.  Dry skin.  Bilateral feet.  Apply ammonium lactate twice daily.  Dry eyes.  Started on artificial tear drops twice daily 8/9/24.  Vertigo.  Continue meclizine 25 mg daily.  Overgrown toenails.  Podiatry consulted.  Constipation.  Patient started on Senokot-S twice daily.  MiraLAX and lactulose as needed.  "

## 2024-08-25 PROCEDURE — 99232 SBSQ HOSP IP/OBS MODERATE 35: CPT | Performed by: PHYSICIAN ASSISTANT

## 2024-08-25 RX ADMIN — NYSTATIN: 100000 POWDER TOPICAL at 08:57

## 2024-08-25 RX ADMIN — FUROSEMIDE 20 MG: 20 TABLET ORAL at 08:57

## 2024-08-25 RX ADMIN — LISINOPRIL 20 MG: 20 TABLET ORAL at 08:57

## 2024-08-25 RX ADMIN — GABAPENTIN 300 MG: 300 CAPSULE ORAL at 08:57

## 2024-08-25 RX ADMIN — GABAPENTIN 300 MG: 300 CAPSULE ORAL at 17:31

## 2024-08-25 RX ADMIN — NYSTATIN 1 APPLICATION: 100000 POWDER TOPICAL at 21:27

## 2024-08-25 RX ADMIN — Medication: at 08:57

## 2024-08-25 RX ADMIN — NYSTATIN: 100000 POWDER TOPICAL at 17:30

## 2024-08-25 RX ADMIN — Medication: at 17:30

## 2024-08-25 RX ADMIN — TRAZODONE HYDROCHLORIDE 50 MG: 50 TABLET ORAL at 21:27

## 2024-08-25 RX ADMIN — DIVALPROEX SODIUM 1250 MG: 500 TABLET, FILM COATED, EXTENDED RELEASE ORAL at 21:27

## 2024-08-25 RX ADMIN — MECLIZINE HYDROCHLORIDE 25 MG: 12.5 TABLET ORAL at 08:57

## 2024-08-25 RX ADMIN — RISPERIDONE 1 MG: 1 TABLET, FILM COATED ORAL at 21:27

## 2024-08-25 RX ADMIN — PRAZOSIN HYDROCHLORIDE 1 MG: 1 CAPSULE ORAL at 21:27

## 2024-08-25 RX ADMIN — GABAPENTIN 300 MG: 300 CAPSULE ORAL at 21:27

## 2024-08-25 RX ADMIN — SENNOSIDES AND DOCUSATE SODIUM 1 TABLET: 50; 8.6 TABLET ORAL at 17:31

## 2024-08-25 RX ADMIN — SENNOSIDES AND DOCUSATE SODIUM 1 TABLET: 50; 8.6 TABLET ORAL at 08:57

## 2024-08-25 RX ADMIN — PANTOPRAZOLE SODIUM 20 MG: 20 TABLET, DELAYED RELEASE ORAL at 06:28

## 2024-08-25 RX ADMIN — CARIPRAZINE 3 MG: 3 CAPSULE, GELATIN COATED ORAL at 08:57

## 2024-08-25 RX ADMIN — GLYCERIN 2 DROP: .002; .002; .01 SOLUTION/ DROPS OPHTHALMIC at 08:57

## 2024-08-25 RX ADMIN — ATORVASTATIN CALCIUM 10 MG: 10 TABLET, FILM COATED ORAL at 17:31

## 2024-08-25 NOTE — NURSING NOTE
Patient visible on the unit. Calm and cooperative. Participated in snack. Compliant with medications. Denies A/V/H/, SI, and HI. Endorses mild anxiety and depression. Sat at nurses station and colored. Will continue to monitor and access. Continuous rounding maintained.

## 2024-08-25 NOTE — PLAN OF CARE
Problem: DISCHARGE PLANNING  Goal: Discharge to home or other facility with appropriate resources  Description: INTERVENTIONS:  - Identify barriers to discharge w/patient and caregiver  - Arrange for needed discharge resources and transportation as appropriate  - Identify discharge learning needs (meds, wound care, etc.)  - Arrange for interpretive services to assist at discharge as needed  - Refer to Case Management Department for coordinating discharge planning if the patient needs post-hospital services based on physician/advanced practitioner order or complex needs related to functional status, cognitive ability, or social support system  Outcome: Progressing     Problem: Risk for Self Injury/Neglect  Goal: Treatment Goal: Remain safe during length of stay, learn and adopt new coping skills, and be free of self-injurious ideation, impulses and acts at the time of discharge  Outcome: Progressing  Goal: Verbalize thoughts and feelings  Description: Interventions:  - Assess and re-assess patient's lethality and potential for self-injury  - Engage patient in 1:1 interactions, daily, for a minimum of 15 minutes  - Encourage patient to express feelings, fears, frustrations, hopes  - Establish rapport/trust with patient   Interventions:  - Assess and re-assess patient's level of risk   - Engage patient in 1:1 interactions, daily, for a minimum of 15 minutes   - Encourage patient to express feelings, fears, frustrations, hopes   Outcome: Progressing  Goal: Attend and participate in unit activities, including therapeutic, recreational, and educational groups  Description: Interventions:  - Provide therapeutic and educational activities daily, encourage attendance and participation, and document same in the medical record  - Obtain collateral information, encourage visitation and family involvement in care   Interventions:  - Provide therapeutic and educational activities daily, encourage attendance and participation,  and document same in the medical record   Outcome: Progressing  Goal: Recognize maladaptive responses and adopt new coping mechanisms  Outcome: Progressing     Problem: Depression  Goal: Verbalize thoughts and feelings  Description: Interventions:  - Assess and re-assess patient's lethality and potential for self-injury  - Engage patient in 1:1 interactions, daily, for a minimum of 15 minutes  - Encourage patient to express feelings, fears, frustrations, hopes  - Establish rapport/trust with patient   Interventions:  - Assess and re-assess patient's level of risk   - Engage patient in 1:1 interactions, daily, for a minimum of 15 minutes   - Encourage patient to express feelings, fears, frustrations, hopes   Outcome: Progressing  Goal: Attend and participate in unit activities, including therapeutic, recreational, and educational groups  Description: Interventions:  - Provide therapeutic and educational activities daily, encourage attendance and participation, and document same in the medical record  - Obtain collateral information, encourage visitation and family involvement in care   Interventions:  - Provide therapeutic and educational activities daily, encourage attendance and participation, and document same in the medical record   Outcome: Progressing  Goal: Treatment Goal: Demonstrate behavioral control of depressive symptoms, verbalize feelings of improved mood/affect, and adopt new coping skills prior to discharge  Outcome: Progressing  Goal: Refrain from harming self  Description: Interventions:  - Monitor patient closely, per order   - Supervise medication ingestion, monitor effects and side effects   Outcome: Progressing  Goal: Refrain from isolation  Description: Interventions:  - Develop a trusting relationship   - Encourage socialization   Outcome: Progressing  Goal: Refrain from self-neglect  Outcome: Progressing  Goal: Complete daily ADLs, including personal hygiene independently, as  able  Description: Interventions:  - Observe, teach, and assist patient with ADLS  -  Monitor and promote a balance of rest/activity, with adequate nutrition and elimination   Outcome: Progressing     Problem: Anxiety  Goal: Anxiety is at manageable level  Description: Interventions:  - Assess and monitor patient's anxiety level.   - Monitor for signs and symptoms (heart palpitations, chest pain, shortness of breath, headaches, nausea, feeling jumpy, restlessness, irritable, apprehensive).   - Collaborate with interdisciplinary team and initiate plan and interventions as ordered.  - [place] patient to unit/surroundings  - Explain treatment plan  - Encourage participation in care  - Encourage verbalization of concerns/fears  - Identify coping mechanisms  - Assist in developing anxiety-reducing skills  - Administer/offer alternative therapies  - Limit or eliminate stimulants  Outcome: Progressing

## 2024-08-25 NOTE — NURSING NOTE
B - Patient is obese, abdomen is large. It is soft. Patient reports a BM yesterday. Bowel sounds positive x4.  L - Lungs clear to auscultation. Denies SOB or chest pain.  E - Patient has +3 pitting edema to BLLE. Encouraged to elevate her legs and she is not compliant.  P - CRT <3 seconds. PPP.  S - Slight redness under bilateral breasts and abdominal folds. Nystatin applied. Patient reminded to keep the areas clean and dry and verbalized understanding.

## 2024-08-25 NOTE — NURSING NOTE
Patient is visible in milieu for meals and group. Pleasant upon interaction. Denies anxiety and depression. Denies SI/HI/AVH. Patient states feeling great this morning. Patient is compliant with medications. No acute behaviors noted. Remains in paper clothes as agreed in treatment team. Crayons given to patient upon request. Patient complains of toenail curling under and causing pain. Medical aware. Patient had 30 minutes of using colored pencils with supervision at this time. Q 7 min safety checks maintained. Fall precautions maintained.   Home

## 2024-08-25 NOTE — PROGRESS NOTES
"Progress Note - Leydi Khan 54 y.o. female MRN: [id number]    Unit/Bed#: [facility] 200-02 Encounter: [id number]        Subjective:   Patient seen and examined at bedside after reviewing the chart and discussing the case with the caring staff.      Patient examined at bedside.  Patient has no acute symptoms.     Physical Exam   Vitals: Blood pressure 120/60, pulse 88, temperature 97.6 °F (36.4 °C), temperature source Temporal, resp. rate 18, height 5' 4\" (1.626 m), weight (!) 136 kg (300 lb 6.4 oz), SpO2 97%.,Body mass index is 51.56 kg/m².  Constitutional: Patient in no acute distress.  HEENT: PERR, EOMI, MMM.  Cardiovascular: Normal rate and regular rhythm.  Pulmonary/Chest: Effort normal and breath sounds normal.  Abdomen: Soft, + BS, NT.    Assessment/Plan:  Leydi Khan is a(n) 54 y.o. year old female with MDD.    Cardiac with hx of HTN, HLD.  Continue lisinopril 20 mg daily, atorvastatin 10 mg daily, Lasix 20 mg daily.  Seizure disorder.  Patient is on Depakote ER 1250 mg at bedtime.  DJD/OA/chronic low back pain.  Tylenol, lidocaine patch daily and Voltaren gel.  GERD.  Patient is on Protonix 20 mg daily.  Prediabetes.  Hgb A1c 5.7% on 6/11/24.  Lifestyle modifications.  Neuropathy.  Continue gabapentin to 300 mg TID.  Vitamin D deficiency.  Patient started on vitamin D2 90727 units weekly x 8 weeks followed by vitamin D3 1000 units daily.  Vitamin B12 deficiency.  Not on supplement.  Intertrigo.  Involving undersurface of right breast, groin, sacrum.  Apply nystatin powder twice daily.  Dry skin.  Bilateral feet.  Apply ammonium lactate twice daily.  Dry eyes.  Started on artificial tear drops twice daily 8/9/24.  Vertigo.  Continue meclizine 25 mg daily.  Constipation.  Patient started on Senokot-S twice daily.  MiraLAX and lactulose as needed.  "

## 2024-08-25 NOTE — PROGRESS NOTES
"Progress Note - Behavioral Health     Leydi Khan 54 y.o. female MRN: [id number]   Unit/Bed#: [facility] 200-02 Encounter: [id number]  This note was not shared with the patient due to reasonable likelihood of causing patient harm    Behavior over the last 24 hours: unchanged.      Leydi seen in hallway in front of nurses station.  Chart reviewed.  Leydi has been coloring a lot.  Asking to have colored pencils- \"I feel safe\".  Denies urges to self-injure. Denies voices.     Sleep:  good  Appetite:  good  Medication side effects: none reported  ROS: eyes burning (will ask for eye drops), bladder pressure- being monitored by medical team.     Mental Status Evaluation:    Appearance:  looks stated age   Behavior:  Childlike, cooperative   Speech:  normal rate and volume   Mood:  mildly anxious   Affect:  mood-congruent   Thought Process:  goal directed   Associations: concrete associations   Thought Content:  no overt delusions   Perceptual Disturbances: denies auditory hallucinations when asked   Risk Potential: Suicidal ideation - None at present  Homicidal ideation - None at present   Sensorium:  oriented to person, place, and time/date   Memory:  recent and remote memory grossly intact   Consciousness:  alert and awake   Attention: attention span and concentration are age appropriate   Insight:  limited   Judgment: limited   Gait/Station: Nesha chair   Motor Activity: no abnormal movements     Vital signs in last 24 hours:    Temp:  [97.6 °F (36.4 °C)-98.3 °F (36.8 °C)] 97.6 °F (36.4 °C)  HR:  [82-88] 88  Resp:  [16-18] 18  BP: (109-120)/(56-60) 120/60    Laboratory results: I have personally reviewed all pertinent laboratory/tests results.        Assessment & Plan   Principal Problem:    Schizoaffective disorder, depressive type (HCC)  Active Problems:    Primary hypertension    Hyperlipidemia    Class 3 severe obesity due to excess calories without serious comorbidity in adult (HCC)    PTSD (post-traumatic stress " disorder)    Borderline personality disorder (HCC)    Bilateral leg edema    Recommended Treatment: cont present treatment    Planned medication and treatment changes:  no change- vraylar 3 mg/d, depakote er 1250 mg/d, neurontin 300 mg tid, prazosin 1 mg q bedtime, risperdal 1 mg q bedtime, trazodone 50 mg q bedtime    All current active medications have been reviewed  Encourage group therapy, milieu therapy and occupational therapy  Behavioral Health checks every 7 minutes  Current Facility-Administered Medications   Medication Dose Route Frequency Provider Last Rate    acetaminophen  650 mg Oral Q4H PRN Aditya Anthony-Juanis, CRNP      acetaminophen  650 mg Oral Q4H PRN Aditya Farias, CRNP      acetaminophen  975 mg Oral Q6H PRN Aditya Farias, ISABELLNP      aluminum-magnesium hydroxide-simethicone  30 mL Oral Q4H PRN Sarah L Dagnall, PA-C      ammonium lactate   Topical BID Sarah L Dagnall, PA-C      Artificial Tears  2 drop Both Eyes BID Sarah L Dagnall, PA-C      atorvastatin  10 mg Oral Daily With Dinner SHANI Hawk      benztropine  0.5 mg Oral Q4H PRN Max 6/day SHANI Hawk      cariprazine  3 mg Oral Daily SHANI Hawk      [START ON 9/29/2024] Cholecalciferol  1,000 Units Oral Daily Sarah L Dagnall, PA-C      Diclofenac Sodium  2 g Topical 4x Daily PRN Sarah L Dagnall, PA-C      divalproex sodium  1,250 mg Oral HS SHANI Hawk      ergocalciferol  50,000 Units Oral Weekly Sarah L Dagnall, PA-C      furosemide  20 mg Oral Daily Aditya Farias, SHANI      gabapentin  300 mg Oral TID Renato Mcmanus MD      hydrOXYzine HCL  25 mg Oral Q6H PRN Max 4/day Aditya Farias, SHANI      lactulose  20 g Oral BID PRN Sarah L Daglanel, PA-C      lisinopril  20 mg Oral Daily Aditya Farias, SHANI      LORazepam  1 mg Intramuscular Q6H PRN Max 3/day SHANI Hawk      LORazepam  0.5 mg Oral Q6H PRN Max 4/day SHANI Hawk       LORazepam  1 mg Oral Q6H PRN Max 3/day [facility], CRNP      meclizine  25 mg Oral Daily Sarah Mckay PA-C      nystatin   Topical TID Sarah Mckay PA-C      OLANZapine  5 mg Intramuscular Q3H PRN Max 3/day [facility] Gabrielle-An[facility], CRNP      OLANZapine  2.5 mg Oral Q4H PRN Max 6/day [facility] Gabrielle-[facility], CRNP      OLANZapine  5 mg Oral Q4H PRN Max 3/day [facility] Gabrielle-Anom, CRNP      OLANZapine  5 mg Oral Q3H PRN Max 3/day [facility] Gabrielle-[facility]om, CRNP      pantoprazole  20 mg Oral Early Morning Sarah Mckay PA-C      polyethylene glycol  17 g Oral Daily PRN Sarah Mckay PA-C      prazosin  1 mg Oral HS Renato Mcmanus MD      risperiDONE  1 mg Oral HS [facility], CRNP      senna-docusate sodium  1 tablet Oral BID Sarah Mckay PA-C      traZODone  50 mg Oral HS [facility], CRNP         Risks / Benefits of Treatment:    Risks, benefits, and possible side effects of medications explained to patient. Patient has limited understanding of risks and benefits of treatment at this time, but agrees to take medications as prescribed.    Counseling / Coordination of Care:    Total floor / unit time spent today 15 minutes. Greater than 50% of total time was spent with the patient and / or family counseling and / or coordination of care. A description of counseling / coordination of care:  Patient's progress discussed with staff in treatment team meeting.  Medications, treatment progress and treatment plan reviewed with patient.    Ena Wayne PA-C 08/25/24

## 2024-08-26 PROCEDURE — 99231 SBSQ HOSP IP/OBS SF/LOW 25: CPT | Performed by: PSYCHIATRY & NEUROLOGY

## 2024-08-26 RX ADMIN — SENNOSIDES AND DOCUSATE SODIUM 1 TABLET: 50; 8.6 TABLET ORAL at 16:01

## 2024-08-26 RX ADMIN — HYDROXYZINE HYDROCHLORIDE 25 MG: 25 TABLET ORAL at 23:44

## 2024-08-26 RX ADMIN — CARIPRAZINE 3 MG: 3 CAPSULE, GELATIN COATED ORAL at 09:34

## 2024-08-26 RX ADMIN — GLYCERIN 2 DROP: .002; .002; .01 SOLUTION/ DROPS OPHTHALMIC at 16:01

## 2024-08-26 RX ADMIN — GABAPENTIN 300 MG: 300 CAPSULE ORAL at 16:01

## 2024-08-26 RX ADMIN — PANTOPRAZOLE SODIUM 20 MG: 20 TABLET, DELAYED RELEASE ORAL at 06:14

## 2024-08-26 RX ADMIN — MECLIZINE HYDROCHLORIDE 25 MG: 12.5 TABLET ORAL at 08:37

## 2024-08-26 RX ADMIN — ATORVASTATIN CALCIUM 10 MG: 10 TABLET, FILM COATED ORAL at 16:01

## 2024-08-26 RX ADMIN — RISPERIDONE 1 MG: 1 TABLET, FILM COATED ORAL at 21:09

## 2024-08-26 RX ADMIN — NYSTATIN: 100000 POWDER TOPICAL at 16:01

## 2024-08-26 RX ADMIN — FUROSEMIDE 20 MG: 20 TABLET ORAL at 08:38

## 2024-08-26 RX ADMIN — DIVALPROEX SODIUM 1250 MG: 500 TABLET, FILM COATED, EXTENDED RELEASE ORAL at 21:09

## 2024-08-26 RX ADMIN — SENNOSIDES AND DOCUSATE SODIUM 1 TABLET: 50; 8.6 TABLET ORAL at 08:37

## 2024-08-26 RX ADMIN — TRAZODONE HYDROCHLORIDE 50 MG: 50 TABLET ORAL at 21:09

## 2024-08-26 RX ADMIN — GABAPENTIN 300 MG: 300 CAPSULE ORAL at 08:37

## 2024-08-26 RX ADMIN — Medication: at 16:02

## 2024-08-26 RX ADMIN — LISINOPRIL 20 MG: 20 TABLET ORAL at 08:37

## 2024-08-26 RX ADMIN — GABAPENTIN 300 MG: 300 CAPSULE ORAL at 21:09

## 2024-08-26 NOTE — NURSING NOTE
Patient visible on the unit. Pleasant and cooperative.  Denies SI, HI, hallucinations, anxiety, and depression. Compliant with medications. Able to make needs known. Q 7 minute checks maintained. Will continue to monitor and access.

## 2024-08-26 NOTE — NURSING NOTE
Presents with baseline affect,denies depression,anxiety,SI,HI,AH,VH.Leydi is visible,makes needs known,is compliant with medications,unit rules,appetite is good and is independent with ADLs and ambulation.We discussed ways to increase coping skills/prevent falls.Will continue to educate,monitor,and provide safe,therapeutic milieu.

## 2024-08-26 NOTE — PROGRESS NOTES
Progress Note - Behavioral Health   Leydi Khan 54 y.o. female MRN: [id number]  Unit/Bed#: [facility] 200-02 Encounter: [id number]    Assessment & Plan   Principal Problem:    Schizoaffective disorder, depressive type (HCC)  Active Problems:    Primary hypertension    Hyperlipidemia    Class 3 severe obesity due to excess calories without serious comorbidity in adult (HCC)    PTSD (post-traumatic stress disorder)    Borderline personality disorder (HCC)    Bilateral leg edema      Behavior over the last 24 hours:  some improvement  Sleep: normal  Appetite: normal  Medication side effects: No  ROS: no complaints and all other systems are negative    Subjective: Leydi was seen today for psychiatric follow-up. Patient is calm, cooperative. She is visible on the unit, social with peers. She reports an improving mood. She is controlled on the unit with no recent self injurious behavioral. She is compliant with her current medication regimen. She denied any SI/HI/AVH. She did not appear internally preoccupied.    Mental Status Evaluation:  Appearance:  age appropriate and overweight   Behavior:  Child like   Speech:  normal pitch and normal volume   Mood:  improving   Affect:  mood-congruent   Thought Process:  goal directed   Associations: intact associations   Thought Content:  No overt delusions   Perceptual Disturbances: Denied AVH, did not appear internally preoccupied   Risk Potential: Suicidal Ideations none at present  Homicidal Ideations none at present  Potential for Aggression No   Sensorium:  person, place, and time/date   Memory:  recent and remote memory grossly intact   Consciousness:  alert and awake    Attention: attention span and concentration were age appropriate   Insight:  limited   Judgment: limited   Gait/Station: In bed   Motor Activity: no abnormal movements     Progress Toward Goals: Some improvement. Patient denied any thoughts to self injure. Her mood is controlled with no recent behaviors. She is  compliant with her current psychotropic medication regimen. She denied side effects from current psychotropic medication regimen. Will continue current psychotropic medication regimen. No discharge date at this time.    Recommended Treatment: Continue with group therapy, milieu therapy and occupational therapy.      Risks, benefits and possible side effects of Medications:   Risks, benefits, and possible side effects of medications explained to patient and patient verbalizes understanding.      Medications: all current active meds have been reviewed.    Labs: I have personally reviewed all pertinent laboratory/tests results. Most Recent Labs:   Lab Results   Component Value Date    WBC 6.93 08/07/2024    RBC 3.77 (L) 08/07/2024    HGB 12.0 08/07/2024    HCT 35.9 08/07/2024     08/07/2024    RDW 14.6 08/07/2024    NEUTROABS 4.34 04/20/2024    SODIUM 138 08/07/2024    K 4.4 08/07/2024    CL 98 08/07/2024    CO2 33 (H) 08/07/2024    BUN 10 08/07/2024    CREATININE 0.47 (L) 08/07/2024    GLUC 140 08/07/2024    GLUF 114 (H) 06/21/2024    CALCIUM 9.3 08/07/2024    AST 11 (L) 08/07/2024    ALT 17 08/07/2024    ALKPHOS 60 08/07/2024    TP 7.0 08/07/2024    ALB 3.9 08/07/2024    TBILI 0.49 08/07/2024    CHOLESTEROL 168 06/21/2024    HDL 32 (L) 06/21/2024    TRIG 156 (H) 06/21/2024    LDLCALC 105 (H) 06/21/2024    NONHDLC 136 06/21/2024    VALPROICTOT 56 08/12/2024    [id number] 2.179 08/07/2024    FREET4 0.65 06/11/2024    PREGSERUM Negative 04/28/2022    RPR Non-Reactive 12/13/2022    HGBA1C 5.7 (H) 06/11/2024     06/11/2024       Counseling / Coordination of Care  Total floor / unit time spent today 25 minutes.

## 2024-08-26 NOTE — PROGRESS NOTES
"Progress Note - Leydi Khan 54 y.o. female MRN: [id number]    Unit/Bed#: -02 Encounter: [id number]        Subjective:   Patient seen and examined at bedside after reviewing the chart and discussing the case with the caring staff.      Patient examined at bedside.  Patient has no acute symptoms.     Physical Exam   Vitals: Blood pressure 142/66, pulse 82, temperature (!) 97 °F (36.1 °C), temperature source Temporal, resp. rate 16, height 5' 4\" (1.626 m), weight (!) 136 kg (300 lb 6.4 oz), SpO2 93%.,Body mass index is 51.56 kg/m².  Constitutional: Patient in no acute distress.  HEENT: PERR, EOMI, MMM.  Cardiovascular: Normal rate and regular rhythm.  Pulmonary/Chest: Effort normal and breath sounds normal.  Abdomen: Soft, + BS, NT.    Assessment/Plan:  Leydi Khan is a(n) 54 y.o. year old female with MDD.    Cardiac with hx of HTN, HLD.  Continue lisinopril 20 mg daily, atorvastatin 10 mg daily, Lasix 20 mg daily.  Seizure disorder.  Patient is on Depakote ER 1250 mg at bedtime.  DJD/OA/chronic low back pain.  Tylenol, lidocaine patch daily and Voltaren gel.  GERD.  Patient is on Protonix 20 mg daily.  Prediabetes.  Hgb A1c 5.7% on 6/11/24.  Lifestyle modifications.  Neuropathy.  Continue gabapentin to 300 mg TID.  Vitamin D deficiency.  Patient started on vitamin D2 60438 units weekly x 8 weeks followed by vitamin D3 1000 units daily.  Vitamin B12 deficiency.  Not on supplement.  Intertrigo.  Involving undersurface of right breast, groin, sacrum.  Apply nystatin powder twice daily.  Dry skin.  Bilateral feet.  Apply ammonium lactate twice daily.  Dry eyes.  Started on artificial tear drops twice daily 8/9/24.  Vertigo.  Continue meclizine 25 mg daily.  Constipation.  Patient started on Senokot-S twice daily.  MiraLAX and lactulose as needed.  "

## 2024-08-26 NOTE — NURSING NOTE
Pt visible on uni and social with select peers. Pt brightens on approach and was calm, pleasant, and cooperative this morning. Pt denies anxiety, depression, SI/HI/AVH. Continuous monitoring maintained.

## 2024-08-26 NOTE — PLAN OF CARE
Problem: Ineffective Coping  Goal: Participates in unit activities  Description: Interventions:  - Provide therapeutic environment   - Provide required programming   - Redirect inappropriate behaviors   Outcome: Progressing     Problem: Risk for Self Injury/Neglect  Goal: Attend and participate in unit activities, including therapeutic, recreational, and educational groups  Description: Interventions:  - Provide therapeutic and educational activities daily, encourage attendance and participation, and document same in the medical record  - Obtain collateral information, encourage visitation and family involvement in care   Interventions:  - Provide therapeutic and educational activities daily, encourage attendance and participation, and document same in the medical record   Outcome: Progressing     Problem: Depression  Goal: Attend and participate in unit activities, including therapeutic, recreational, and educational groups  Description: Interventions:  - Provide therapeutic and educational activities daily, encourage attendance and participation, and document same in the medical record  - Obtain collateral information, encourage visitation and family involvement in care   Interventions:  - Provide therapeutic and educational activities daily, encourage attendance and participation, and document same in the medical record   Outcome: Progressing   Patient out for groups and is openly sharing with staff and peers.

## 2024-08-26 NOTE — PROGRESS NOTES
08/26/24   Team Meeting   Meeting Type Daily Rounds   Team Members Present   Team Members Present Physician;Occupational Therapist;Nurse;   Physician Team Member Dr Marietta CRISTOBAL   Nursing Team Member Miguel VALDEZ   Social Work Team Member Justice DE LA TORRE   OT Team Member Murphy DINERO   Patient/Family Present   Patient Present No   Patient's Family Present No   [facility] psych, baseline, d/c Thurs/Fri

## 2024-08-26 NOTE — PLAN OF CARE
Problem: Ineffective Coping  Goal: Identifies ineffective coping skills  Outcome: Progressing     Problem: Ineffective Coping  Goal: Free from restraint events  Description: - Utilize least restrictive measures   - Provide behavioral interventions   - Redirect inappropriate behaviors   Outcome: Progressing     Problem: Depression  Goal: Refrain from isolation  Description: Interventions:  - Develop a trusting relationship   - Encourage socialization   Outcome: Progressing

## 2024-08-27 PROCEDURE — 99231 SBSQ HOSP IP/OBS SF/LOW 25: CPT

## 2024-08-27 RX ORDER — AMMONIUM LACTATE 12 G/100G
LOTION TOPICAL 2 TIMES DAILY PRN
Status: DISCONTINUED | OUTPATIENT
Start: 2024-08-27 | End: 2024-08-30 | Stop reason: HOSPADM

## 2024-08-27 RX ORDER — NYSTATIN 100000 [USP'U]/G
POWDER TOPICAL 3 TIMES DAILY PRN
Status: DISCONTINUED | OUTPATIENT
Start: 2024-08-27 | End: 2024-08-30 | Stop reason: HOSPADM

## 2024-08-27 RX ADMIN — MECLIZINE HYDROCHLORIDE 25 MG: 12.5 TABLET ORAL at 08:30

## 2024-08-27 RX ADMIN — PANTOPRAZOLE SODIUM 20 MG: 20 TABLET, DELAYED RELEASE ORAL at 06:09

## 2024-08-27 RX ADMIN — GABAPENTIN 300 MG: 300 CAPSULE ORAL at 15:54

## 2024-08-27 RX ADMIN — FUROSEMIDE 20 MG: 20 TABLET ORAL at 08:30

## 2024-08-27 RX ADMIN — CARIPRAZINE 3 MG: 3 CAPSULE, GELATIN COATED ORAL at 08:30

## 2024-08-27 RX ADMIN — DIVALPROEX SODIUM 1250 MG: 500 TABLET, FILM COATED, EXTENDED RELEASE ORAL at 21:28

## 2024-08-27 RX ADMIN — GABAPENTIN 300 MG: 300 CAPSULE ORAL at 21:28

## 2024-08-27 RX ADMIN — ATORVASTATIN CALCIUM 10 MG: 10 TABLET, FILM COATED ORAL at 15:54

## 2024-08-27 RX ADMIN — TRAZODONE HYDROCHLORIDE 50 MG: 50 TABLET ORAL at 21:28

## 2024-08-27 RX ADMIN — SENNOSIDES AND DOCUSATE SODIUM 1 TABLET: 50; 8.6 TABLET ORAL at 15:54

## 2024-08-27 RX ADMIN — LISINOPRIL 20 MG: 20 TABLET ORAL at 08:30

## 2024-08-27 RX ADMIN — DICLOFENAC SODIUM 2 G: 10 GEL TOPICAL at 21:33

## 2024-08-27 RX ADMIN — PRAZOSIN HYDROCHLORIDE 1 MG: 1 CAPSULE ORAL at 21:27

## 2024-08-27 RX ADMIN — GABAPENTIN 300 MG: 300 CAPSULE ORAL at 08:30

## 2024-08-27 RX ADMIN — RISPERIDONE 1 MG: 1 TABLET, FILM COATED ORAL at 21:28

## 2024-08-27 RX ADMIN — SENNOSIDES AND DOCUSATE SODIUM 1 TABLET: 50; 8.6 TABLET ORAL at 08:30

## 2024-08-27 NOTE — PROGRESS NOTES
"Progress Note - Leydi Khan 54 y.o. female MRN: [id number]    Unit/Bed#: -02 Encounter: [id number]        Subjective:   Patient seen and examined at bedside after reviewing the chart and discussing the case with the caring staff.      Patient examined at bedside.  Patient has no acute symptoms.     Patient is a possible discharge on Friday 8/30/2024.      Physical Exam   Vitals: Blood pressure 119/64, pulse 80, temperature 97.8 °F (36.6 °C), temperature source Temporal, resp. rate 18, height 5' 4\" (1.626 m), weight (!) 136 kg (300 lb 6.4 oz), SpO2 95%.,Body mass index is 51.56 kg/m².  Constitutional: Patient in no acute distress.  HEENT: PERR, EOMI, MMM.  Cardiovascular: Normal rate and regular rhythm.  Pulmonary/Chest: Effort normal and breath sounds normal.  Abdomen: Soft, + BS, NT.    Assessment/Plan:  Leydi Khan is a(n) 54 y.o. year old female with MDD.    Cardiac with hx of HTN, HLD.  Continue lisinopril 20 mg daily, atorvastatin 10 mg daily, Lasix 20 mg daily.  Seizure disorder.  Patient is on Depakote ER 1250 mg at bedtime.  DJD/OA/chronic low back pain.  Tylenol, lidocaine patch daily and Voltaren gel.  GERD.  Patient is on Protonix 20 mg daily.  Prediabetes.  Hgb A1c 5.7% on 6/11/24.  Lifestyle modifications.  Neuropathy.  Continue gabapentin to 300 mg TID.  Vitamin D deficiency.  Patient started on vitamin D2 67087 units weekly x 8 weeks followed by vitamin D3 1000 units daily.  Vitamin B12 deficiency.  Not on supplement.  Intertrigo.  Involving undersurface of right breast, groin, sacrum.  Apply nystatin powder twice daily.  Dry skin.  Bilateral feet.  Apply ammonium lactate twice daily.  Dry eyes.  Started on artificial tear drops twice daily 8/9/24.  Vertigo.  Continue meclizine 25 mg daily.  Constipation.  Patient started on Senokot-S twice daily.  MiraLAX and lactulose as needed.    The patient was discussed with Dr. Jarquin and he is in agreement with the above note.  "

## 2024-08-27 NOTE — NURSING NOTE
Out coloring in hallway. Patient social. Moderate anxiety and depression. No suicidal ideations. No acute behaviors behaviors during shift.  No complaints of pain. Complaint with HS snack.  Fall protocol continues.  Care Plan reviewed and amended.  Safe environment provided.  Maintained on q 7 minute safety checks.

## 2024-08-27 NOTE — PROGRESS NOTES
08/27/24   Team Meeting   Meeting Type Daily Rounds   Team Members Present   Team Members Present Physician;Occupational Therapist;Nurse;   Physician Team Member Dr Marietta Hinton MD   Nursing Team Member Amelia VALDEZ   Social Work Team Member Justice DE LA TORRE   OT Team Member Murphy CTRS   Patient/Family Present   Patient Present No   Patient's Family Present No   Denies all, appropriate, med compliant, minimal behaviors, mild anx PRN effective, d/c Fri to [facility]

## 2024-08-27 NOTE — NURSING NOTE
No complaints of anxiety overnight. Patient appears to have slept (uninterrupted) overnight. No respiratory distress observed while sleeping. No acute behaviors this shift. Maintained on Q 7 min safety checks. Fall safety precautions remain in place.  Fluids at bedside to promote hydration.

## 2024-08-27 NOTE — SOCIAL WORK
Medications 08/27/24 08/28/24 08/29/24 08/30/24 08/31/24 09/01/24 09/02/24     Freq: 2 times daily Route: TP  Start: 08/08/24 1800 End: 08/27/24 0837   Admin Instructions:   For topical use ONLY; AVOID use on eyes, lips, or mucous membranes   Order specific questions:       [phone] [id number]-D/C'd              Dose: 2 drop  Freq: 2 times daily Route: Both Eyes  Start: 08/09/24 1045 End: 08/27/24 0837    [id number]) [id number]-D/C'd            atorvastatin (LIPITOR) tablet 10 mg  Dose: 10 mg  Freq: Daily with dinner Route: PO  Start: 08/08/24 1630    1630      1630      1630      1630      1630      1630         cariprazine (VRAYLAR) capsule 3 mg  Dose: 3 mg  Freq: Daily Route: PO  Indications Comment: Schizoaffective disorder, depressive type (HCC)  Start: 08/16/24 0900 0830 0900 0900 0900 0900          Cholecalciferol (VITAMIN D3) tablet 1,000 Units  Dose: 1,000 Units  Freq: Daily Route: PO  Start: 09/29/24 0900   Admin Instructions:   25 mcg = 1000 units of cholecalciferol (Vitamin D3)             divalproex sodium (DEPAKOTE ER) 24 hr tablet 1,250 mg  Dose: 1,250 mg  Freq: Daily at bedtime Route: PO  Start: 08/07/24 2200   Admin Instructions:   Swallow whole; do not crush, chew or split. Hazardous Agent, use appropriate precautions for handling and disposal.    2200 2200 2200 2200 2200          ergocalciferol (VITAMIN D2) capsule 50,000 Units  Dose: 50,000 Units  Freq: Weekly Route: PO  Start: 08/10/24 0900 End: 10/05/24 0859   Admin Instructions:   Swallow whole; do not crush, chew or empty contents.        0900          furosemide (LASIX) tablet 20 mg  Dose: 20 mg  Freq: Daily Route: PO  Start: 08/08/24 0900   Admin Instructions:   [facility]   Order specific questions:       0830 0900 0900 0900 0900 0900         gabapentin (NEURONTIN) capsule 300 mg  Dose: 300 mg  Freq: 3 times daily Route: PO  Start: 08/09/24 2100   Admin Instructions:    [facility]    0830     1600     2100      0900     1600     2100      0900     1600     2100      0900     1600     2100           lisinopril (ZESTRIL) tablet 20 mg  Dose: 20 mg  Freq: Daily Route: PO  Start: 08/08/24 0900   Admin Instructions:   [facility]   Order specific questions:       0830      0900      0900      0900      0900      0900         meclizine (ANTIVERT) tablet 25 mg  Dose: 25 mg  Freq: Daily Route: PO  Indications of Use: VERTIGO  Start: 08/13/24 1015    0830      0900      0900      0900      0900            Freq: 3 times daily Route: TP  Start: 08/19/24 1600 End: 08/27/24 0837   Admin Instructions:   [facility]    [phone] [id number]-D/C'd            pantoprazole (PROTONIX) EC tablet 20 mg  Dose: 20 mg  Freq: Daily (early morning) Route: PO  Start: 08/09/24 0600   Admin Instructions:   Swallow whole; do not crush, chew or split. [facility]    0609      0600 0600      0600           prazosin (MINIPRESS) capsule 1 mg  Dose: 1 mg  Freq: Daily at bedtime Route: PO  Start: 08/08/24 2200   Order specific questions:       2200 2200 2200 2200 2200 2200         risperiDONE (RisperDAL) tablet 1 mg  Dose: 1 mg  Freq: Daily at bedtime Route: PO  Indications Comment: schizoaffective disorder depressive type  Start: 08/19/24 2200   Admin Instructions:   Hazardous agent; use appropriate precautions for handling and disposal. [facility]    2200 2200 2200 2200 2200          senna-docusate sodium (SENOKOT S) 8.6-50 mg per tablet 1 tablet  Dose: 1 tablet  Freq: 2 times daily Route: PO  Start: 08/16/24 1800    0830     1800      0900     1800      0900     1800      0900     1800      0900     1800          traZODone (DESYREL) tablet 50 mg  Dose: 50 mg  Freq: Daily at bedtime Route: PO  Start: 08/07/24 2200   Admin Instructions:   [facility]    2200 2200 2200       2137

## 2024-08-27 NOTE — PROGRESS NOTES
Progress Note - Behavioral Health   Leydi Khan 54 y.o. female MRN: [id number]  Unit/Bed#: [facility] 200-02 Encounter: [id number]    Assessment & Plan   Principal Problem:    Schizoaffective disorder, depressive type (HCC)  Active Problems:    Primary hypertension    Hyperlipidemia    Class 3 severe obesity due to excess calories without serious comorbidity in adult (HCC)    PTSD (post-traumatic stress disorder)    Borderline personality disorder ([facility])    Bilateral leg edema      Behavior over the last 24 hours:  Unchanged  Sleep: normal  Appetite: normal  Medication side effects: No  ROS: no complaints and all other systems are negative    Subjective: Leydi was seen today for psychiatric follow-up. Patient remains calm, cooperative. She is visible on the unit, social with peers. Her mood is controlled with no recent self injurious thoughts. She is compliant with her current medication regimen. She denied any SI/HI/AVH. She did not appear internally preoccupied.    Mental Status Evaluation:  Appearance:  age appropriate   Behavior:  cooperative   Speech:  normal pitch and normal volume   Mood:  improving   Affect:  mood-congruent   Thought Process:  goal directed   Associations: intact associations   Thought Content:  No overt delusions   Perceptual Disturbances: Denied AVH, did not appear internally preoccupied   Risk Potential: Suicidal Ideations none at present  Homicidal Ideations none at present  Potential for Aggression No   Sensorium:  person, place, and time/date   Memory:  recent and remote memory grossly intact   Consciousness:  alert and awake    Attention: attention span and concentration were age appropriate   Insight:  limited   Judgment: fair   Gait/Station: normal gait/station   Motor Activity: no abnormal movements     Progress Toward Goals: Unchanged. Patient continues to be controlled on the unit. She is compliant with her current psychotropic medication regimen. Will continue current psychotropic  medication regimen. Anticipated discharge on 08/30/2024    Recommended Treatment: Continue with group therapy, milieu therapy and occupational therapy.      Risks, benefits and possible side effects of Medications:   Risks, benefits, and possible side effects of medications explained to patient and patient verbalizes understanding.      Medications: all current active meds have been reviewed.    Labs: I have personally reviewed all pertinent laboratory/tests results. Most Recent Labs:   Lab Results   Component Value Date    WBC 6.93 08/07/2024    RBC 3.77 (L) 08/07/2024    HGB 12.0 08/07/2024    HCT 35.9 08/07/2024     08/07/2024    RDW 14.6 08/07/2024    NEUTROABS 4.34 04/20/2024    SODIUM 138 08/07/2024    K 4.4 08/07/2024    CL 98 08/07/2024    CO2 33 (H) 08/07/2024    BUN 10 08/07/2024    CREATININE 0.47 (L) 08/07/2024    GLUC 140 08/07/2024    GLUF 114 (H) 06/21/2024    CALCIUM 9.3 08/07/2024    AST 11 (L) 08/07/2024    ALT 17 08/07/2024    ALKPHOS 60 08/07/2024    TP 7.0 08/07/2024    ALB 3.9 08/07/2024    TBILI 0.49 08/07/2024    CHOLESTEROL 168 06/21/2024    HDL 32 (L) 06/21/2024    TRIG 156 (H) 06/21/2024    LDLCALC 105 (H) 06/21/2024    NONHDLC 136 06/21/2024    VALPROICTOT 56 08/12/2024    [id number] 2.179 08/07/2024    FREET4 0.65 06/11/2024    PREGSERUM Negative 04/28/2022    RPR Non-Reactive 12/13/2022    HGBA1C 5.7 (H) 06/11/2024     06/11/2024       Counseling / Coordination of Care  Total floor / unit time spent today 25 minutes.

## 2024-08-27 NOTE — NURSING NOTE
Complained of mild anxiety. Smith Scale 7 at 2344. hydrOXYzine HCL (ATARAX) tablet 25 mg given at this time. Reassessed at 0037. Patient sleeping. Smith Scale 0. Medication effective.

## 2024-08-27 NOTE — PLAN OF CARE
Problem: Ineffective Coping  Goal: Identifies ineffective coping skills  Outcome: Progressing     Problem: Ineffective Coping  Goal: Demonstrates healthy coping skills  Outcome: Progressing     Problem: Risk for Self Injury/Neglect  Goal: Treatment Goal: Remain safe during length of stay, learn and adopt new coping skills, and be free of self-injurious ideation, impulses and acts at the time of discharge  Outcome: Progressing     Problem: Risk for Self Injury/Neglect  Goal: Verbalize thoughts and feelings  Description: Interventions:  - Assess and re-assess patient's lethality and potential for self-injury  - Engage patient in 1:1 interactions, daily, for a minimum of 15 minutes  - Encourage patient to express feelings, fears, frustrations, hopes  - Establish rapport/trust with patient   Interventions:  - Assess and re-assess patient's level of risk   - Engage patient in 1:1 interactions, daily, for a minimum of 15 minutes   - Encourage patient to express feelings, fears, frustrations, hopes   Outcome: Progressing     Problem: Depression  Goal: Verbalize thoughts and feelings  Description: Interventions:  - Assess and re-assess patient's lethality and potential for self-injury  - Engage patient in 1:1 interactions, daily, for a minimum of 15 minutes  - Encourage patient to express feelings, fears, frustrations, hopes  - Establish rapport/trust with patient   Interventions:  - Assess and re-assess patient's level of risk   - Engage patient in 1:1 interactions, daily, for a minimum of 15 minutes   - Encourage patient to express feelings, fears, frustrations, hopes   Outcome: Progressing     Problem: Depression  Goal: Treatment Goal: Demonstrate behavioral control of depressive symptoms, verbalize feelings of improved mood/affect, and adopt new coping skills prior to discharge  Outcome: Progressing     Problem: Depression  Goal: Refrain from isolation  Description: Interventions:  - Develop a trusting relationship    - Encourage socialization   Outcome: Progressing

## 2024-08-27 NOTE — PLAN OF CARE
Problem: Risk for Self Injury/Neglect  Goal: Treatment Goal: Remain safe during length of stay, learn and adopt new coping skills, and be free of self-injurious ideation, impulses and acts at the time of discharge  Outcome: Progressing  Goal: Verbalize thoughts and feelings  Description: Interventions:  - Assess and re-assess patient's lethality and potential for self-injury  - Engage patient in 1:1 interactions, daily, for a minimum of 15 minutes  - Encourage patient to express feelings, fears, frustrations, hopes  - Establish rapport/trust with patient   Interventions:  - Assess and re-assess patient's level of risk   - Engage patient in 1:1 interactions, daily, for a minimum of 15 minutes   - Encourage patient to express feelings, fears, frustrations, hopes   Outcome: Progressing  Goal: Attend and participate in unit activities, including therapeutic, recreational, and educational groups  Description: Interventions:  - Provide therapeutic and educational activities daily, encourage attendance and participation, and document same in the medical record  - Obtain collateral information, encourage visitation and family involvement in care   Interventions:  - Provide therapeutic and educational activities daily, encourage attendance and participation, and document same in the medical record   Outcome: Progressing  Goal: Recognize maladaptive responses and adopt new coping mechanisms  Outcome: Progressing     Problem: Depression  Goal: Verbalize thoughts and feelings  Description: Interventions:  - Assess and re-assess patient's lethality and potential for self-injury  - Engage patient in 1:1 interactions, daily, for a minimum of 15 minutes  - Encourage patient to express feelings, fears, frustrations, hopes  - Establish rapport/trust with patient   Interventions:  - Assess and re-assess patient's level of risk   - Engage patient in 1:1 interactions, daily, for a minimum of 15 minutes   - Encourage patient to express  feelings, fears, frustrations, hopes   Outcome: Progressing  Goal: Attend and participate in unit activities, including therapeutic, recreational, and educational groups  Description: Interventions:  - Provide therapeutic and educational activities daily, encourage attendance and participation, and document same in the medical record  - Obtain collateral information, encourage visitation and family involvement in care   Interventions:  - Provide therapeutic and educational activities daily, encourage attendance and participation, and document same in the medical record   Outcome: Progressing  Goal: Treatment Goal: Demonstrate behavioral control of depressive symptoms, verbalize feelings of improved mood/affect, and adopt new coping skills prior to discharge  Outcome: Progressing  Goal: Refrain from harming self  Description: Interventions:  - Monitor patient closely, per order   - Supervise medication ingestion, monitor effects and side effects   Outcome: Progressing  Goal: Refrain from isolation  Description: Interventions:  - Develop a trusting relationship   - Encourage socialization   Outcome: Progressing  Goal: Refrain from self-neglect  Outcome: Progressing  Goal: Complete daily ADLs, including personal hygiene independently, as able  Description: Interventions:  - Observe, teach, and assist patient with ADLS  -  Monitor and promote a balance of rest/activity, with adequate nutrition and elimination   Outcome: Progressing     Problem: Anxiety  Goal: Anxiety is at manageable level  Description: Interventions:  - Assess and monitor patient's anxiety level.   - Monitor for signs and symptoms (heart palpitations, chest pain, shortness of breath, headaches, nausea, feeling jumpy, restlessness, irritable, apprehensive).   - Collaborate with interdisciplinary team and initiate plan and interventions as ordered.  - [place] patient to unit/surroundings  - Explain treatment plan  - Encourage participation in care  -  Encourage verbalization of concerns/fears  - Identify coping mechanisms  - Assist in developing anxiety-reducing skills  - Administer/offer alternative therapies  - Limit or eliminate stimulants  Outcome: Progressing

## 2024-08-27 NOTE — NURSING NOTE
Presents with baseline affect: endorses mild anxiety/depression denies SI,HI,AH,VH.Leydi is visible,makes needs known,is compliant with medications,unit rules,appetite is good,is independent with ambulation and ADLs.WE discussed ways to prevent falls.Will continue to educate,monitor,and provide safe,therapeutic milieu.

## 2024-08-27 NOTE — SOCIAL WORK
Reagan spoke to Lydia at [facility] [phone] ext [phone]. Provided pt updated, Discussed d/c on Friday.  Pharmacy [facility] [phone] to be called in Thurs.  Pt will f/u with Dr Monae for med mgmt and Dana for talk therapy with 7 days of return home.

## 2024-08-27 NOTE — NURSING NOTE
Patient has been visible in the milieu throughout the morning. She is calm and cooperative. Needs redirection periodically but has not displayed any behavior today. She is compliant with her scheduled medications. Her appetite is good. She denies anxiety and depression at this time. She denies thoughts of self harm. She denies hallucinations. She is able to make make her needs know and offers no current complaints/ concerns. Plan of care continues. Q7 minute safety checks in progress.

## 2024-08-28 LAB — VALPROATE SERPL-MCNC: 69 UG/ML (ref 50–100)

## 2024-08-28 PROCEDURE — 80164 ASSAY DIPROPYLACETIC ACD TOT: CPT

## 2024-08-28 PROCEDURE — 99231 SBSQ HOSP IP/OBS SF/LOW 25: CPT | Performed by: PSYCHIATRY & NEUROLOGY

## 2024-08-28 RX ORDER — AMOXICILLIN 250 MG
1 CAPSULE ORAL 2 TIMES DAILY PRN
Status: DISCONTINUED | OUTPATIENT
Start: 2024-08-28 | End: 2024-08-30 | Stop reason: HOSPADM

## 2024-08-28 RX ORDER — FUROSEMIDE 20 MG
20 TABLET ORAL ONCE
Status: COMPLETED | OUTPATIENT
Start: 2024-08-28 | End: 2024-08-28

## 2024-08-28 RX ADMIN — LISINOPRIL 20 MG: 20 TABLET ORAL at 08:15

## 2024-08-28 RX ADMIN — TRAZODONE HYDROCHLORIDE 50 MG: 50 TABLET ORAL at 20:00

## 2024-08-28 RX ADMIN — RISPERIDONE 1 MG: 1 TABLET, FILM COATED ORAL at 19:59

## 2024-08-28 RX ADMIN — CARIPRAZINE 3 MG: 3 CAPSULE, GELATIN COATED ORAL at 08:15

## 2024-08-28 RX ADMIN — FUROSEMIDE 20 MG: 20 TABLET ORAL at 08:15

## 2024-08-28 RX ADMIN — PRAZOSIN HYDROCHLORIDE 1 MG: 1 CAPSULE ORAL at 20:00

## 2024-08-28 RX ADMIN — FUROSEMIDE 20 MG: 20 TABLET ORAL at 10:38

## 2024-08-28 RX ADMIN — SENNOSIDES AND DOCUSATE SODIUM 1 TABLET: 50; 8.6 TABLET ORAL at 08:15

## 2024-08-28 RX ADMIN — PANTOPRAZOLE SODIUM 20 MG: 20 TABLET, DELAYED RELEASE ORAL at 06:13

## 2024-08-28 RX ADMIN — GABAPENTIN 300 MG: 300 CAPSULE ORAL at 20:01

## 2024-08-28 RX ADMIN — DIVALPROEX SODIUM 1250 MG: 500 TABLET, FILM COATED, EXTENDED RELEASE ORAL at 19:59

## 2024-08-28 RX ADMIN — GABAPENTIN 300 MG: 300 CAPSULE ORAL at 15:42

## 2024-08-28 RX ADMIN — MECLIZINE HYDROCHLORIDE 25 MG: 12.5 TABLET ORAL at 08:15

## 2024-08-28 RX ADMIN — ATORVASTATIN CALCIUM 10 MG: 10 TABLET, FILM COATED ORAL at 15:42

## 2024-08-28 RX ADMIN — GABAPENTIN 300 MG: 300 CAPSULE ORAL at 08:15

## 2024-08-28 NOTE — PLAN OF CARE
Problem: Ineffective Coping  Goal: Participates in unit activities  Description: Interventions:  - Provide therapeutic environment   - Provide required programming   - Redirect inappropriate behaviors   Outcome: Progressing     Problem: Risk for Self Injury/Neglect  Goal: Attend and participate in unit activities, including therapeutic, recreational, and educational groups  Description: Interventions:  - Provide therapeutic and educational activities daily, encourage attendance and participation, and document same in the medical record  - Obtain collateral information, encourage visitation and family involvement in care   Interventions:  - Provide therapeutic and educational activities daily, encourage attendance and participation, and document same in the medical record   Outcome: Progressing     Problem: Depression  Goal: Attend and participate in unit activities, including therapeutic, recreational, and educational groups  Description: Interventions:  - Provide therapeutic and educational activities daily, encourage attendance and participation, and document same in the medical record  - Obtain collateral information, encourage visitation and family involvement in care   Interventions:  - Provide therapeutic and educational activities daily, encourage attendance and participation, and document same in the medical record   Outcome: Progressing   Patient out for groups and has been more controlled and appropriate.

## 2024-08-28 NOTE — PLAN OF CARE
Problem: Ineffective Coping  Goal: Participates in unit activities  Description: Interventions:  - Provide therapeutic environment   - Provide required programming   - Redirect inappropriate behaviors   Outcome: Progressing     Problem: Ineffective Coping  Goal: Understands least restrictive measures  Description: Interventions:  - Utilize least restrictive behavior  Outcome: Progressing     Problem: Risk for Self Injury/Neglect  Goal: Verbalize thoughts and feelings  Description: Interventions:  - Assess and re-assess patient's lethality and potential for self-injury  - Engage patient in 1:1 interactions, daily, for a minimum of 15 minutes  - Encourage patient to express feelings, fears, frustrations, hopes  - Establish rapport/trust with patient   Interventions:  - Assess and re-assess patient's level of risk   - Engage patient in 1:1 interactions, daily, for a minimum of 15 minutes   - Encourage patient to express feelings, fears, frustrations, hopes   Outcome: Progressing     Problem: Depression  Goal: Verbalize thoughts and feelings  Description: Interventions:  - Assess and re-assess patient's lethality and potential for self-injury  - Engage patient in 1:1 interactions, daily, for a minimum of 15 minutes  - Encourage patient to express feelings, fears, frustrations, hopes  - Establish rapport/trust with patient   Interventions:  - Assess and re-assess patient's level of risk   - Engage patient in 1:1 interactions, daily, for a minimum of 15 minutes   - Encourage patient to express feelings, fears, frustrations, hopes   Outcome: Progressing

## 2024-08-28 NOTE — NURSING NOTE
"States she is \"hearing voices\", the voice of her ex boyfriend telling her she \"is not a good person.\" Reassured reality.Will continue to monitor.  "

## 2024-08-28 NOTE — PLAN OF CARE
Problem: Ineffective Coping  Goal: Identifies ineffective coping skills  Outcome: Progressing  Goal: Identifies healthy coping skills  Outcome: Progressing  Goal: Demonstrates healthy coping skills  Outcome: Progressing  Goal: Participates in unit activities  Description: Interventions:  - Provide therapeutic environment   - Provide required programming   - Redirect inappropriate behaviors   Outcome: Progressing  Goal: Patient/Family participate in treatment and DC plans  Description: Interventions:  - Provide therapeutic environment  Outcome: Progressing  Goal: Patient/Family verbalizes awareness of resources  Outcome: Progressing  Goal: Understands least restrictive measures  Description: Interventions:  - Utilize least restrictive behavior  Outcome: Progressing  Goal: Free from restraint events  Description: - Utilize least restrictive measures   - Provide behavioral interventions   - Redirect inappropriate behaviors   Outcome: Progressing     Problem: Risk for Self Injury/Neglect  Goal: Treatment Goal: Remain safe during length of stay, learn and adopt new coping skills, and be free of self-injurious ideation, impulses and acts at the time of discharge  Outcome: Progressing  Goal: Verbalize thoughts and feelings  Description: Interventions:  - Assess and re-assess patient's lethality and potential for self-injury  - Engage patient in 1:1 interactions, daily, for a minimum of 15 minutes  - Encourage patient to express feelings, fears, frustrations, hopes  - Establish rapport/trust with patient   Interventions:  - Assess and re-assess patient's level of risk   - Engage patient in 1:1 interactions, daily, for a minimum of 15 minutes   - Encourage patient to express feelings, fears, frustrations, hopes   Outcome: Progressing  Goal: Attend and participate in unit activities, including therapeutic, recreational, and educational groups  Description: Interventions:  - Provide therapeutic and educational activities  daily, encourage attendance and participation, and document same in the medical record  - Obtain collateral information, encourage visitation and family involvement in care   Interventions:  - Provide therapeutic and educational activities daily, encourage attendance and participation, and document same in the medical record   Outcome: Progressing  Goal: Recognize maladaptive responses and adopt new coping mechanisms  Outcome: Progressing     Problem: Depression  Goal: Verbalize thoughts and feelings  Description: Interventions:  - Assess and re-assess patient's lethality and potential for self-injury  - Engage patient in 1:1 interactions, daily, for a minimum of 15 minutes  - Encourage patient to express feelings, fears, frustrations, hopes  - Establish rapport/trust with patient   Interventions:  - Assess and re-assess patient's level of risk   - Engage patient in 1:1 interactions, daily, for a minimum of 15 minutes   - Encourage patient to express feelings, fears, frustrations, hopes   Outcome: Progressing  Goal: Attend and participate in unit activities, including therapeutic, recreational, and educational groups  Description: Interventions:  - Provide therapeutic and educational activities daily, encourage attendance and participation, and document same in the medical record  - Obtain collateral information, encourage visitation and family involvement in care   Interventions:  - Provide therapeutic and educational activities daily, encourage attendance and participation, and document same in the medical record   Outcome: Progressing  Goal: Treatment Goal: Demonstrate behavioral control of depressive symptoms, verbalize feelings of improved mood/affect, and adopt new coping skills prior to discharge  Outcome: Progressing  Goal: Refrain from harming self  Description: Interventions:  - Monitor patient closely, per order   - Supervise medication ingestion, monitor effects and side effects   Outcome:  Progressing  Goal: Refrain from isolation  Description: Interventions:  - Develop a trusting relationship   - Encourage socialization   Outcome: Progressing  Goal: Refrain from self-neglect  Outcome: Progressing  Goal: Complete daily ADLs, including personal hygiene independently, as able  Description: Interventions:  - Observe, teach, and assist patient with ADLS  -  Monitor and promote a balance of rest/activity, with adequate nutrition and elimination   Outcome: Progressing     Problem: Anxiety  Goal: Anxiety is at manageable level  Description: Interventions:  - Assess and monitor patient's anxiety level.   - Monitor for signs and symptoms (heart palpitations, chest pain, shortness of breath, headaches, nausea, feeling jumpy, restlessness, irritable, apprehensive).   - Collaborate with interdisciplinary team and initiate plan and interventions as ordered.  - [place] patient to unit/surroundings  - Explain treatment plan  - Encourage participation in care  - Encourage verbalization of concerns/fears  - Identify coping mechanisms  - Assist in developing anxiety-reducing skills  - Administer/offer alternative therapies  - Limit or eliminate stimulants  Outcome: Progressing     Problem: Nutrition/Hydration-ADULT  Goal: Nutrient/Hydration intake appropriate for improving, restoring or maintaining nutritional needs  Description: Monitor and assess patient's nutrition/hydration status for malnutrition. Collaborate with interdisciplinary team and initiate plan and interventions as ordered.  Monitor patient's weight and dietary intake as ordered or per policy. Utilize nutrition screening tool and intervene as necessary. Determine patient's food preferences and provide high-protein, high-caloric foods as appropriate.     INTERVENTIONS:  - Monitor oral intake, urinary output, labs, and treatment plans  - Assess nutrition and hydration status and recommend course of action  - Evaluate amount of meals eaten  - Assist patient  with eating if necessary   - Allow adequate time for meals  - Recommend/ encourage appropriate diets, oral nutritional supplements, and vitamin/mineral supplements  - Order, calculate, and assess calorie counts as needed  - Recommend, monitor, and adjust tube feedings and TPN/PPN based on assessed needs  - Assess need for intravenous fluids  - Provide specific nutrition/hydration education as appropriate  - Include patient/family/caregiver in decisions related to nutrition  Outcome: Progressing

## 2024-08-28 NOTE — PROGRESS NOTES
Progress Note - Behavioral Health   Leydi Khan 54 y.o. female MRN: [id number]  Unit/Bed#: [facility] 200-02 Encounter: [id number]    Assessment & Plan   Principal Problem:    Schizoaffective disorder, depressive type (HCC)  Active Problems:    Primary hypertension    Hyperlipidemia    Class 3 severe obesity due to excess calories without serious comorbidity in adult (HCC)    PTSD (post-traumatic stress disorder)    Borderline personality disorder (HCC)    Bilateral leg edema      Behavior over the last 24 hours:  Unchanged  Sleep: normal  Appetite: normal  Medication side effects: No  ROS: no complaints and all other systems are negative    Subjective: Leydi was seen today for psychiatric follow-up. Patient calm, cooperative. She is visible on the unit social with peers. Patient is controlled on the unit wit no recent self injurious behaviors. She is compliant with her current medication regimen. Patient is less depressed and anxious. She is compliant with her current medication regimen. She denied any SI/HI/AVH. She did not appear internally preoccupied.    Mental Status Evaluation:  Appearance:  age appropriate and overweight   Behavior:  Calm, pleasant   Speech:  normal pitch and normal volume   Mood:  Less depressed   Affect:  mood-congruent   Thought Process:  goal directed   Associations: intact associations   Thought Content:  No overt delusions   Perceptual Disturbances: Denied AVH, did not appear internally preoccupied   Risk Potential: Suicidal Ideations none at present  Homicidal Ideations none at present  Potential for Aggression No   Sensorium:  person, place, and time/date   Memory:  recent and remote memory grossly intact   Consciousness:  alert and awake    Attention: attention span and concentration were age appropriate   Insight:  limited   Judgment: fair   Gait/Station: Seated in a chair   Motor Activity: no abnormal movements     Progress Toward Goals: Unchanged. Patient is less depressed, visible  social with no recent self injurious behaviors. She is compliant with her current psychotropic medication regimen. She denied side effects from current psychotropic medication regimen. No discharge date at this time.    Recommended Treatment: Continue with group therapy, milieu therapy and occupational therapy.      Risks, benefits and possible side effects of Medications:   Risks, benefits, and possible side effects of medications explained to patient and patient verbalizes understanding.      Medications: all current active meds have been reviewed.    Labs: I have personally reviewed all pertinent laboratory/tests results. Most Recent Labs:   Lab Results   Component Value Date    WBC 6.93 08/07/2024    RBC 3.77 (L) 08/07/2024    HGB 12.0 08/07/2024    HCT 35.9 08/07/2024     08/07/2024    RDW 14.6 08/07/2024    NEUTROABS 4.34 04/20/2024    SODIUM 138 08/07/2024    K 4.4 08/07/2024    CL 98 08/07/2024    CO2 33 (H) 08/07/2024    BUN 10 08/07/2024    CREATININE 0.47 (L) 08/07/2024    GLUC 140 08/07/2024    GLUF 114 (H) 06/21/2024    CALCIUM 9.3 08/07/2024    AST 11 (L) 08/07/2024    ALT 17 08/07/2024    ALKPHOS 60 08/07/2024    TP 7.0 08/07/2024    ALB 3.9 08/07/2024    TBILI 0.49 08/07/2024    CHOLESTEROL 168 06/21/2024    HDL 32 (L) 06/21/2024    TRIG 156 (H) 06/21/2024    LDLCALC 105 (H) 06/21/2024    NONHDLC 136 06/21/2024    VALPROICTOT 56 08/12/2024    ZDK7DAPEOEOB 2.179 08/07/2024    FREET4 0.65 06/11/2024    PREGSERUM Negative 04/28/2022    RPR Non-Reactive 12/13/2022    HGBA1C 5.7 (H) 06/11/2024     06/11/2024       Counseling / Coordination of Care  Total floor / unit time spent today 25 minutes.

## 2024-08-28 NOTE — PROGRESS NOTES
"Progress Note - Leydi Khan 54 y.o. female MRN: [id number]    Unit/Bed#: -02 Encounter: [id number]        Subjective:   Patient seen and examined at bedside after reviewing the chart and discussing the case with the caring staff.      Patient examined at bedside.  Patient complaining of leg swelling.  No pain, redness, warmth.    Patient is a possible discharge on Friday 8/30/2024.      Physical Exam   Vitals: Blood pressure 118/58, pulse 79, temperature (!) 97.4 °F (36.3 °C), temperature source Temporal, resp. rate 18, height 5' 4\" (1.626 m), weight (!) 136 kg (300 lb 6.4 oz), SpO2 95%.,Body mass index is 51.56 kg/m².  Constitutional: Patient in no acute distress.  HEENT: PERR, EOMI, MMM.  Cardiovascular: Normal rate and regular rhythm.  Pulmonary/Chest: Effort normal and breath sounds normal.  Abdomen: Soft, + BS, NT.  Skin:  +1 pitting edema bilateral lower extremities.  No erythema, warmth, calf tenderness.     Assessment/Plan:  Leydi Khan is a(n) 54 y.o. year old female with MDD.    Cardiac with hx of HTN, HLD.  Continue lisinopril 20 mg daily, atorvastatin 10 mg daily, Lasix 20 mg daily.  Seizure disorder.  Patient is on Depakote ER 1250 mg at bedtime.  DJD/OA/chronic low back pain.  Tylenol, lidocaine patch daily and Voltaren gel.  GERD.  Patient is on Protonix 20 mg daily.  Prediabetes.  Hgb A1c 5.7% on 6/11/24.  Lifestyle modifications.  Neuropathy.  Continue gabapentin to 300 mg TID.  Vitamin D deficiency.  Patient started on vitamin D2 98115 units weekly x 8 weeks followed by vitamin D3 1000 units daily.  Vitamin B12 deficiency.  Not on supplement.  Intertrigo.  Involving undersurface of right breast, groin, sacrum.  Apply nystatin powder twice daily as needed.  Vertigo.  Continue meclizine 25 mg daily.  Constipation.  Patient started on Senokot-S twice daily.  MiraLAX and lactulose as needed.  Bilateral leg swelling.  On Lasix 20 mg daily.  Give additional 20 mg x1 dose today.  Change to low " sodium diet.  Encouraged to elevate extremities.     The patient was discussed with Dr. Jarquin and he is in agreement with the above note.

## 2024-08-28 NOTE — PROGRESS NOTES
08/28/24    Team Meeting   Meeting Type Daily Rounds   Team Members Present   Team Members Present Physician;Occupational Therapist;Nurse;   Physician Team Member Dr Marietta MCLEOD   Nursing Team Member Newton RN   Social Work Team Member Justice DE LA TORRE   OT Team Member Murphy DINERO   Patient/Family Present   Patient Present No   Patient's Family Present No   D/c Fri [facility], psych f/u [facility], coloring, bright, denies all, slept

## 2024-08-28 NOTE — NURSING NOTE
"Patient has been visible in the milieu this morning. She is bright and pleasant. Engage sin conversation. Reports \"I'm doing really good\". She denies anxiety and depression at this time. Denies Si/Hi and hallucinations. She has been compliant with her scheduled medications. Her appetite is good. She is up and ambulatory without difficulty. She is able to make her needs known. Plan of care continues. Q7 minute safety checks in progress.   "

## 2024-08-28 NOTE — NURSING NOTE
"Presents with constricted affect,endorses high and anxiety and depression RT stating she is not sure she is ready for DC.Denies active SI(passive in nature),denies HI,AH,VH.Leydi handed in her pencils due to her passive \"bad thoughts\" is sitting in close proximity to nurses station.She converses freely with both staff and peers,makes needs known,is compliant with medications,unit rules,appetite is adequate,is independent with ambulation and requires some assistance in \"wiping\" post BM.We discussed  the power of positive thinking.Will continue to educate,monitor,and provide safe,therapeutic milieu.  "

## 2024-08-29 PROBLEM — D64.9 ANEMIA: Status: RESOLVED | Noted: 2023-11-03 | Resolved: 2024-08-29

## 2024-08-29 PROBLEM — F39 MOOD DISORDER (HCC): Status: ACTIVE | Noted: 2024-08-29

## 2024-08-29 PROBLEM — R45.851 DEPRESSION WITH SUICIDAL IDEATION: Status: ACTIVE | Noted: 2024-08-29

## 2024-08-29 PROBLEM — F39 MOOD DISORDER (HCC): Status: RESOLVED | Noted: 2024-08-29 | Resolved: 2024-08-29

## 2024-08-29 PROBLEM — F32.A DEPRESSION WITH SUICIDAL IDEATION: Status: RESOLVED | Noted: 2024-08-29 | Resolved: 2024-08-29

## 2024-08-29 PROBLEM — F32.A DEPRESSION WITH SUICIDAL IDEATION: Status: ACTIVE | Noted: 2024-08-29

## 2024-08-29 PROBLEM — R45.851 DEPRESSION WITH SUICIDAL IDEATION: Status: RESOLVED | Noted: 2024-08-29 | Resolved: 2024-08-29

## 2024-08-29 LAB
SARS-COV-2 AG UPPER RESP QL IA: NEGATIVE
SARS-COV-2 AG UPPER RESP QL IA: NORMAL

## 2024-08-29 PROCEDURE — 99232 SBSQ HOSP IP/OBS MODERATE 35: CPT | Performed by: PSYCHIATRY & NEUROLOGY

## 2024-08-29 PROCEDURE — 87811 SARS-COV-2 COVID19 W/OPTIC: CPT

## 2024-08-29 RX ORDER — ERGOCALCIFEROL 1.25 MG/1
50000 CAPSULE, LIQUID FILLED ORAL WEEKLY
Qty: 12 CAPSULE | Refills: 0 | Status: SHIPPED | OUTPATIENT
Start: 2024-08-29 | End: 2024-11-15

## 2024-08-29 RX ORDER — LORATADINE 10 MG/1
10 TABLET ORAL AS NEEDED
Start: 2024-08-29

## 2024-08-29 RX ORDER — MECLIZINE HYDROCHLORIDE 25 MG/1
25 TABLET ORAL DAILY
Qty: 30 TABLET | Refills: 0 | Status: SHIPPED | OUTPATIENT
Start: 2024-08-30

## 2024-08-29 RX ORDER — DIVALPROEX SODIUM 250 MG/1
1250 TABLET, EXTENDED RELEASE ORAL
Qty: 150 TABLET | Refills: 0 | Status: SHIPPED | OUTPATIENT
Start: 2024-08-29

## 2024-08-29 RX ORDER — ATORVASTATIN CALCIUM 10 MG/1
10 TABLET, FILM COATED ORAL
Qty: 30 TABLET | Refills: 0 | Status: SHIPPED | OUTPATIENT
Start: 2024-08-29

## 2024-08-29 RX ORDER — PRAZOSIN HYDROCHLORIDE 1 MG/1
1 CAPSULE ORAL
Qty: 30 CAPSULE | Refills: 0 | Status: SHIPPED | OUTPATIENT
Start: 2024-08-29

## 2024-08-29 RX ORDER — FUROSEMIDE 20 MG
20 TABLET ORAL DAILY
Qty: 30 TABLET | Refills: 0 | Status: SHIPPED | OUTPATIENT
Start: 2024-08-29

## 2024-08-29 RX ORDER — LISINOPRIL 20 MG/1
20 TABLET ORAL DAILY
Qty: 30 TABLET | Refills: 0 | Status: SHIPPED | OUTPATIENT
Start: 2024-08-29

## 2024-08-29 RX ORDER — PANTOPRAZOLE SODIUM 20 MG/1
20 TABLET, DELAYED RELEASE ORAL DAILY
Qty: 30 TABLET | Refills: 0 | Status: SHIPPED | OUTPATIENT
Start: 2024-08-29

## 2024-08-29 RX ORDER — RISPERIDONE 1 MG/1
1 TABLET ORAL
Qty: 30 TABLET | Refills: 0 | Status: SHIPPED | OUTPATIENT
Start: 2024-08-29

## 2024-08-29 RX ORDER — GABAPENTIN 300 MG/1
300 CAPSULE ORAL 3 TIMES DAILY
Qty: 90 CAPSULE | Refills: 0 | Status: SHIPPED | OUTPATIENT
Start: 2024-08-29

## 2024-08-29 RX ADMIN — PRAZOSIN HYDROCHLORIDE 1 MG: 1 CAPSULE ORAL at 21:29

## 2024-08-29 RX ADMIN — GABAPENTIN 300 MG: 300 CAPSULE ORAL at 15:18

## 2024-08-29 RX ADMIN — FUROSEMIDE 20 MG: 20 TABLET ORAL at 08:32

## 2024-08-29 RX ADMIN — GABAPENTIN 300 MG: 300 CAPSULE ORAL at 21:29

## 2024-08-29 RX ADMIN — LISINOPRIL 20 MG: 20 TABLET ORAL at 08:32

## 2024-08-29 RX ADMIN — GABAPENTIN 300 MG: 300 CAPSULE ORAL at 08:32

## 2024-08-29 RX ADMIN — CARIPRAZINE 3 MG: 3 CAPSULE, GELATIN COATED ORAL at 08:32

## 2024-08-29 RX ADMIN — RISPERIDONE 1 MG: 1 TABLET, FILM COATED ORAL at 21:29

## 2024-08-29 RX ADMIN — MECLIZINE HYDROCHLORIDE 25 MG: 12.5 TABLET ORAL at 08:32

## 2024-08-29 RX ADMIN — ACETAMINOPHEN 975 MG: 325 TABLET ORAL at 11:22

## 2024-08-29 RX ADMIN — PANTOPRAZOLE SODIUM 20 MG: 20 TABLET, DELAYED RELEASE ORAL at 05:02

## 2024-08-29 RX ADMIN — TRAZODONE HYDROCHLORIDE 50 MG: 50 TABLET ORAL at 21:29

## 2024-08-29 RX ADMIN — ATORVASTATIN CALCIUM 10 MG: 10 TABLET, FILM COATED ORAL at 15:18

## 2024-08-29 RX ADMIN — DIVALPROEX SODIUM 1250 MG: 500 TABLET, FILM COATED, EXTENDED RELEASE ORAL at 21:29

## 2024-08-29 NOTE — NURSING NOTE
Presents with baseline affect,pleasant,cooperative,endorses hmoderate to high depression RT slated DC tomorrow:denies anxiety,SI,HI,AH,VH.She is visible,makes needs known,is compliant with medications,unit rules,appetite is good,is independent with ambulation and ADLs with the exception of x 1 assist in wiping anal area post BM.We discussed s/s of impending psychological decompensation and when to report/request evaluation.Will continue to educate,monitor,and provide safe,therapeutic milieu.

## 2024-08-29 NOTE — PROGRESS NOTES
08/29/24     Team Meeting   Meeting Type Daily Rounds   Team Members Present   Team Members Present Physician;Occupational Therapist;Nurse;   Physician Team Member Dr Marietta MCLEOD   Nursing Team Member Newton RN   Social Work Team Member Justice DE LA TORRE   OT Team Member Murphy CTRS   Patient/Family Present   Patient Present No   Patient's Family Present No   D/c Fri [facility], psych f/u [facility], broken sleep, baseline, upset about d/c, visible, contracts for safety, mild anx, high dep, ah of x boyfriend, reviewed labs

## 2024-08-29 NOTE — PLAN OF CARE
Problem: Ineffective Coping  Goal: Demonstrates healthy coping skills  Outcome: Progressing     Problem: Risk for Self Injury/Neglect  Goal: Treatment Goal: Remain safe during length of stay, learn and adopt new coping skills, and be free of self-injurious ideation, impulses and acts at the time of discharge  Outcome: Progressing     Problem: Risk for Self Injury/Neglect  Goal: Verbalize thoughts and feelings  Description: Interventions:  - Assess and re-assess patient's lethality and potential for self-injury  - Engage patient in 1:1 interactions, daily, for a minimum of 15 minutes  - Encourage patient to express feelings, fears, frustrations, hopes  - Establish rapport/trust with patient   Interventions:  - Assess and re-assess patient's level of risk   - Engage patient in 1:1 interactions, daily, for a minimum of 15 minutes   - Encourage patient to express feelings, fears, frustrations, hopes   Outcome: Progressing     Problem: Depression  Goal: Verbalize thoughts and feelings  Description: Interventions:  - Assess and re-assess patient's lethality and potential for self-injury  - Engage patient in 1:1 interactions, daily, for a minimum of 15 minutes  - Encourage patient to express feelings, fears, frustrations, hopes  - Establish rapport/trust with patient   Interventions:  - Assess and re-assess patient's level of risk   - Engage patient in 1:1 interactions, daily, for a minimum of 15 minutes   - Encourage patient to express feelings, fears, frustrations, hopes   Outcome: Progressing     Problem: Depression  Goal: Refrain from harming self  Description: Interventions:  - Monitor patient closely, per order   - Supervise medication ingestion, monitor effects and side effects   Outcome: Progressing     Problem: Depression  Goal: Refrain from isolation  Description: Interventions:  - Develop a trusting relationship   - Encourage socialization   Outcome: Progressing

## 2024-08-29 NOTE — CMS CERTIFICATION NOTE
Recertification: Based upon physical, mental and social evaluations, I certify that inpatient psychiatric services continue to be medically necessary for this patient for a duration of 30 midnights for the treatment of Schizoaffective disorder, depressive type (HCC) Available alternative community resources still do not meet the patient's mental health care needs. I further attest that an established written individualized plan of care has been updated and is outlined in the patient's medical records.

## 2024-08-29 NOTE — NURSING NOTE
Patient had difficulty falling asleep with broken sleep on and off throughout the night. Patient slept most of the night with minimal interruptions. No s/s of distress.  Monitoring continues

## 2024-08-29 NOTE — PROGRESS NOTES
08/29/24 0846   Activity/Group Checklist   Group Admission/Discharge   Attendance Attended   Attendance Duration (min) 0-15   Interactions Interacted appropriately   Affect/Mood Appropriate   Goals Achieved Identified feelings;Identified triggers;Identified relapse prevention strategies;Discussed coping strategies;Discussed discharge plans;Identified resources and support systems;Able to listen to others;Able to engage in interactions;Able to reflect/comment on own behavior;Able to manage/cope with feelings;Able to self-disclose;Able to recieve feedback;Able to experience relief/decrease in symptoms     Patient agreeable to meet and complete relapse prevention plan with [facility].  Patient information from forms can be found in media.

## 2024-08-29 NOTE — NURSING NOTE
"Patient has been visible in the milieu this morning. She is calm and cooperative. Bright upon approach. She is compliant with her scheduled medications. Her appetite is good. She states \"I had a really rough night last night. I was having second thoughts about my discharge\". Pt reports feeling unsafe last night but is proud of herself for acknowledging this and turning in her colored pencils for safety purposes. She currently denies anxiety. She rate her depression as high due to her upcoming discharge. She denies any current thoughts of self harm. She is up and ambulatory without difficulty. She is able o make her needs known. Plan of care continues. Q7 minute safety checks in progress.   "

## 2024-08-29 NOTE — PROGRESS NOTES
Progress Note - Behavioral Health   Leydi Khan 54 y.o. female MRN: [id number]  Unit/Bed#: [facility] 200-02 Encounter: [id number]    Assessment & Plan   Principal Problem:    Schizoaffective disorder, depressive type (HCC)  Active Problems:    Primary hypertension    Hyperlipidemia    Class 3 severe obesity due to excess calories without serious comorbidity in adult (HCC)    PTSD (post-traumatic stress disorder)    Borderline personality disorder ([facility])    Bilateral leg edema      Behavior over the last 24 hours:  Unchanged  Sleep: normal  Appetite: normal  Medication side effects: No  ROS: no complaints and all other systems are negative    Subjective: Leydi was seen today for psychiatric follow-up. Patient continues to exhibit a controlled mood on the unit, with no recent behaviors. She is calm, cooperative, social with peers with adequate participation in the milieu.patient reports depression and anxiety related to her impending discharge, patient reassured. She is compliant with her current medication regimen. She denied any SI/HI/AVH. She did not appear internally preoccupied.    Mental Status Evaluation:  Appearance:  age appropriate   Behavior:  Calm, pleasant   Speech:  normal pitch and normal volume   Mood:  anxious and depressed   Affect:  mood-congruent   Thought Process:  goal directed   Associations: intact associations   Thought Content:  No overt delusions   Perceptual Disturbances: Denied AVH, did not appear internally preoccupied   Risk Potential: Suicidal Ideations none at present  Homicidal Ideations none at present  Potential for Aggression No   Sensorium:  person, place, and time/date   Memory:  recent and remote memory grossly intact   Consciousness:  alert and awake    Attention: attention span and concentration were age appropriate   Insight:  fair   Judgment: fair   Gait/Station: Seated in a chair   Motor Activity: no abnormal movements     Progress Toward Goals: Unchanged. Patient remains  controlled on the unit, with no recent behaviors. She is compliant with her current medication regimen. She denied side effects from her current psychotropic medication regimen. Will continue current medication regimen. Anticipated discharge on 8/30/2024.    Recommended Treatment: Continue with group therapy, milieu therapy and occupational therapy.      Risks, benefits and possible side effects of Medications:   Risks, benefits, and possible side effects of medications explained to patient and patient verbalizes understanding.      Medications: all current active meds have been reviewed.    Labs: I have personally reviewed all pertinent laboratory/tests results. Most Recent Labs:   Lab Results   Component Value Date    WBC 6.93 08/07/2024    RBC 3.77 (L) 08/07/2024    HGB 12.0 08/07/2024    HCT 35.9 08/07/2024     08/07/2024    RDW 14.6 08/07/2024    NEUTROABS 4.34 04/20/2024    SODIUM 138 08/07/2024    K 4.4 08/07/2024    CL 98 08/07/2024    CO2 33 (H) 08/07/2024    BUN 10 08/07/2024    CREATININE 0.47 (L) 08/07/2024    GLUC 140 08/07/2024    GLUF 114 (H) 06/21/2024    CALCIUM 9.3 08/07/2024    AST 11 (L) 08/07/2024    ALT 17 08/07/2024    ALKPHOS 60 08/07/2024    TP 7.0 08/07/2024    ALB 3.9 08/07/2024    TBILI 0.49 08/07/2024    CHOLESTEROL 168 06/21/2024    HDL 32 (L) 06/21/2024    TRIG 156 (H) 06/21/2024    LDLCALC 105 (H) 06/21/2024    NONHDLC 136 06/21/2024    VALPROICTOT 69 08/28/2024    [id number] 2.179 08/07/2024    FREET4 0.65 06/11/2024    PREGSERUM Negative 04/28/2022    RPR Non-Reactive 12/13/2022    HGBA1C 5.7 (H) 06/11/2024     06/11/2024       Counseling / Coordination of Care  Total floor / unit time spent today 25 minutes.

## 2024-08-29 NOTE — DISCHARGE SUMMARY
Discharge Summary - Behavioral Health   Leydi Khan 54 y.o. female MRN: [id number]  Unit/Bed#: [facility] 200-02 Encounter: [id number]     Admission Date: 8/7/2024         Discharge Date: 8/30/2024    Attending Psychiatrist: Renato Mcmanus MD    Reason for Admission/HPI: Mood disorder (HCC) [F39]  Depression with suicidal ideation [F32.A, R45.851]     According to H&P of Dr. Mcmanus    Patient is a 54 y.o. female presented with  a history of Schizoaffective Disorder who was admitted to the inpatient adult psychiatric unit on a voluntary 201 commitment basis due to depression, anxiety, and suicidal ideation.  He presented to ED from [facility] because of worsening of depressive symptoms with suicidal ideation. Patient seen by psych consultant who recommended inpatient admission.  On evaluation in the inpatient psychiatric unit Leydi presents mildly anxious restless but able to engage in appropriate conversation. She reports feeling comfortable in the unit since she knows the staff members from previous numerous admission She endorses worsening hopeless, helplessness, poor sleep and appetite, decrease energy and motivation with increased SI. She admits plan to either walk in front of the car or cut herself. She mention stabbing herself superficially with a fork few days ago. Denies any active plan or intent to hurt herself and she is able to contract for safety presently.  She also states her chronic hallucination has been getting louder and hear her ex-boyfriend voice and causing her having nightmares. Denies any recent alcohol or illicit drug use. Patient agreed to be compliant with medication and treatment plan in the unit.    Hospital Course: The patient was admitted to the inpatient psychiatric unit and started on every 7 minutes precautions. During the hospitalization the patient was attending individual therapy, group therapy, milieu therapy and occupational therapy.    Psychiatric medications were titrated over the  hospital stay. To address depressive symptoms, mood instability, psychotic symptoms, anxiety symptoms, nightmares, and insomnia the patient was started on mood stabilizer Depakote ER, antipsychotic medication Vraylar, anxiolytic medication Neurontin, hypnotic medication Trazodone, and medication to help with nightmares and flashbacks Prazosin. Medication doses were titrated during the hospital course. Prior to beginning of treatment medications risks and benefits and possible side effects including risk of liver impairment related to treatment with Depakote, risk of parkinsonian symptoms, Tardive Dyskinesia and metabolic syndrome related to treatment with antipsychotic medications, risk of cardiovascular events in elderly related to treatment with antipsychotic medications, and risk of impaired next-day mental alertness, complex sleep-related behavior and dependence related to treatment with hypnotic medications were reviewed with the patient. The patient verbalized understanding and agreement for treatment.     Patient's symptoms improved gradually over the hospital course. At the end of treatment the patient was doing well. Mood was stable at the time of discharge. The patient denied suicidal ideation, intent or plan at the time of discharge and denied homicidal ideation, intent or plan at the time of discharge. There was no overt psychosis at the time of discharge. Sleep and appetite were improved. The patient was tolerating medications and was not reporting any significant side effects at the time of discharge.    Since the patient was doing well at the end of the hospitalization, treatment team felt that the patient could be safely discharged to outpatient care.     The outpatient follow up with  [facility] for psych and PCP  was arranged by the unit  upon discharge.    Mental Status at time of Discharge:     Appearance:  age appropriate   Behavior:  Cooperative, pleasant   Speech:  normal pitch and  normal volume   Mood:  euthymic   Affect:  mood-congruent   Thought Process:  goal directed   Thought Content:  No overt delusions   Perceptual Disturbances: Denied AVH, did not appear internally preoccupied   Risk Potential: none   Sensorium:  person, place, and time/date   Cognition:  recent and remote memory grossly intact   Consciousness:  alert and awake    Attention: attention span and concentration were age appropriate   Insight:  fair   Judgment: fair   Gait/Station: normal gait/station   Motor Activity: no abnormal movements     Admission Diagnosis:Mood disorder ([facility]) [F39]  Depression with suicidal ideation [F32.A, R45.851]    Discharge Diagnosis:   Principal Problem:    Schizoaffective disorder, depressive type ([facility])  Active Problems:    Primary hypertension    Hyperlipidemia    Class 3 severe obesity due to excess calories without serious comorbidity in adult ([facility])    PTSD (post-traumatic stress disorder)    Borderline personality disorder ([facility])    Bilateral leg edema  Resolved Problems:    Mood disorder ([facility])    Depression with suicidal ideation        Lab results:  Admission on 08/07/2024   Component Date Value    MRSA Culture Only 08/08/2024 No Methicillin Resistant Staphlyococcus aureus (MRSA) isolated     Vit D, 25-Hydroxy 08/08/2024 19.3 (L)     Vitamin B-12 08/08/2024 433     Valproic Acid, Total 08/12/2024 56     Color, UA 08/18/2024 Yellow     Clarity, UA 08/18/2024 Clear     Specific Gravity, UA 08/18/2024 1.020     pH, UA 08/18/2024 6.0     Leukocytes, UA 08/18/2024 1+ (A)     Nitrite, UA 08/18/2024 Negative     Protein, UA 08/18/2024 Negative     Glucose, UA 08/18/2024 Negative     Ketones, UA 08/18/2024 Negative     Urobilinogen, UA 08/18/2024 4.0 (A)     Bilirubin, UA 08/18/2024 Negative     Occult Blood, UA 08/18/2024 Negative     RBC, UA 08/18/2024 None Seen     WBC, UA 08/18/2024 4-10 (A)     Epithelial Cells 08/18/2024 Occasional     Bacteria, UA 08/18/2024 Occasional     Valproic Acid,  Total 08/28/2024 69        Discharge Medications:  Current Discharge Medication List        START taking these medications    Details   cariprazine (VRAYLAR) 3 MG capsule Take 1 capsule (3 mg total) by mouth daily  Qty: 30 capsule, Refills: 0    Associated Diagnoses: Depression with suicidal ideation              Current Discharge Medication List        STOP taking these medications       melatonin 3 mg Comments:   Reason for Stopping:         clonazePAM (KlonoPIN) 1 mg tablet Comments:   Reason for Stopping:         desvenlafaxine (PRISTIQ) 100 mg 24 hr tablet Comments:   Reason for Stopping:         FLUoxetine (PROzac) 20 mg capsule Comments:   Reason for Stopping:         traZODone (DESYREL) 50 mg tablet Comments:   Reason for Stopping:                Current Discharge Medication List        CONTINUE these medications which have CHANGED    Details   divalproex sodium (DEPAKOTE ER) 250 mg 24 hr tablet Take 5 tablets (1,250 mg total) by mouth daily at bedtime  Qty: 150 tablet, Refills: 0    Associated Diagnoses: Schizoaffective disorder, depressive type (HCC); Mood disorder (HCC)      gabapentin (NEURONTIN) 300 mg capsule Take 1 capsule (300 mg total) by mouth 3 (three) times a day  Qty: 90 capsule, Refills: 0    Associated Diagnoses: Mood disorder (HCC)      prazosin (MINIPRESS) 1 mg capsule Take 1 capsule (1 mg total) by mouth daily at bedtime  Qty: 30 capsule, Refills: 0    Associated Diagnoses: PTSD (post-traumatic stress disorder)      risperiDONE (RisperDAL) 1 mg tablet Take 1 tablet (1 mg total) by mouth daily at bedtime  Qty: 30 tablet, Refills: 0    Associated Diagnoses: Schizoaffective disorder, depressive type (HCC)              Current Discharge Medication List        CONTINUE these medications which have NOT CHANGED    Details   acetaminophen (TYLENOL) 325 mg tablet Take 2 tablets (650 mg total) by mouth every 4 (four) hours as needed for mild pain  Qty: 90 tablet, Refills: 0    Associated Diagnoses:  Chronic midline low back pain without sciatica      ammonium lactate (LAC-HYDRIN) 12 % lotion Apply topically 2 (two) times a day  Qty: 225 g, Refills: 0    Associated Diagnoses: Dry skin      atorvastatin (LIPITOR) 10 mg tablet Take 1 tablet (10 mg total) by mouth daily at bedtime  Qty: 30 tablet, Refills: 0    Associated Diagnoses: Mixed hyperlipidemia      Cholecalciferol (VITAMIN D3) 1,000 units tablet Take 1 tablet (1,000 Units total) by mouth daily Do not start before September 21, 2024.  Qty: 30 tablet, Refills: 0    Associated Diagnoses: Vitamin D deficiency      ergocalciferol (VITAMIN D2) 50,000 units Take 1 capsule (50,000 Units total) by mouth once a week for 12 doses Do not start before July 6, 2024.  Qty: 12 capsule, Refills: 0    Associated Diagnoses: Vitamin D deficiency      furosemide (LASIX) 20 mg tablet Take 1 tablet (20 mg total) by mouth daily  Qty: 30 tablet, Refills: 0    Associated Diagnoses: Bilateral leg edema      Incontinence Supply Disposable (Depend Adjustable Underwear) MISC Use as needed (as needed) Depends 3XL  Qty: 1 each, Refills: 3    Associated Diagnoses: Incontinence in female      lisinopril (ZESTRIL) 20 mg tablet Take 1 tablet (20 mg total) by mouth daily  Qty: 30 tablet, Refills: 0    Associated Diagnoses: Primary hypertension      loratadine (CLARITIN) 10 mg tablet Take 1 tablet (10 mg total) by mouth daily  Qty: 30 tablet, Refills: 0    Associated Diagnoses: Seasonal allergic rhinitis due to pollen      meclizine (ANTIVERT) 12.5 MG tablet Take 1 tablet (12.5 mg total) by mouth every 8 (eight) hours as needed for dizziness  Qty: 30 tablet, Refills: 0    Associated Diagnoses: Dizziness      nystatin (MYCOSTATIN) powder Apply topically 2 (two) times a day  Qty: 30 g, Refills: 0    Associated Diagnoses: Intertrigo      pantoprazole (PROTONIX) 20 mg tablet Take 1 tablet (20 mg total) by mouth daily  Qty: 30 tablet, Refills: 0    Associated Diagnoses: Atypical chest pain       senna-docusate sodium (SENOKOT S) 8.6-50 mg per tablet Take 1 tablet by mouth daily at bedtime  Qty: 30 tablet, Refills: 0    Associated Diagnoses: Constipation              Discharge instructions/Information to patient and family:   See after visit summary for information provided to patient and family.      Provisions for Follow-Up Care:  See after visit summary for information related to follow-up care and any pertinent home health orders.      Discharge Statement     I spent 30 minutes discharging the patient. This time was spent on the day of discharge. I had direct contact with the patient on the day of discharge.     Additional documentation is required if more than 30 minutes were spent on discharge:    I reviewed with Leydi importance of compliance with medications and outpatient treatment after discharge.  I discussed the medication regimen and possible side effects of the medications with Leydi prior to discharge. At the time of discharge she was tolerating psychiatric medications.  I discussed outpatient follow up with Leydi.  I reviewed with Leydi crisis plan and safety plan upon discharge.

## 2024-08-29 NOTE — BH TRANSITION RECORD
Contact Information: If you have any questions, concerns, pended studies, tests and/or procedures, or emergencies regarding your inpatient behavioral health visit. Please contact [facility] [phone] and ask to speak to a , nurse or physician. A contact is available 24 hours/ 7 days a week at this number.     Summary of Procedures Performed During your Stay:  Below is a list of major procedures performed during your hospital stay and a summary of results:  - No major procedures performed.    Pending Studies (From admission, onward)      None          Please follow up on the above pending studies with your PCP and/or referring provider.

## 2024-08-29 NOTE — PROGRESS NOTES
"Progress Note - Leydi Khan 54 y.o. female MRN: [id number]    Unit/Bed#: -02 Encounter: [id number]        Subjective:   Patient seen and examined at bedside after reviewing the chart and discussing the case with the caring staff.      Patient examined at bedside.  Patient denies any acute symptoms.  Leg swelling improving.     Patient is a possible discharge on Friday 8/30/2024.      Physical Exam   Vitals: Blood pressure 125/60, pulse 84, temperature 97.6 °F (36.4 °C), temperature source Temporal, resp. rate 18, height 5' 4\" (1.626 m), weight (!) 136 kg (300 lb 6.4 oz), SpO2 98%.,Body mass index is 51.56 kg/m².  Constitutional: Patient in no acute distress.  HEENT: PERR, EOMI, MMM.  Cardiovascular: Normal rate and regular rhythm.  Pulmonary/Chest: Effort normal and breath sounds normal.  Abdomen: Soft, + BS, NT.  Skin:  +1 pitting edema bilateral lower extremities.  No erythema, warmth, calf tenderness.     Assessment/Plan:  Leydi Khan is a(n) 54 y.o. year old female with MDD.    Cardiac with hx of HTN, HLD.  Continue lisinopril 20 mg daily, atorvastatin 10 mg daily, Lasix 20 mg daily.  Seizure disorder.  Patient is on Depakote ER 1250 mg at bedtime.  DJD/OA/chronic low back pain.  Tylenol, lidocaine patch daily and Voltaren gel.  GERD.  Patient is on Protonix 20 mg daily.  Prediabetes.  Hgb A1c 5.7% on 6/11/24.  Lifestyle modifications.  Neuropathy.  Continue gabapentin to 300 mg TID.  Vitamin D deficiency.  Patient started on vitamin D2 11923 units weekly x 8 weeks followed by vitamin D3 1000 units daily.  Vitamin B12 deficiency.  Not on supplement.  Intertrigo.  Involving undersurface of right breast, groin, sacrum.  Apply nystatin powder twice daily as needed.  Vertigo.  Continue meclizine 25 mg daily.  Constipation.  Patient started on Senokot-S twice daily.  MiraLAX and lactulose as needed.  Bilateral leg swelling.  On Lasix 20 mg daily.  Extra 20 mg x1 dose given 8/28.  Low sodium diet.  Encouraged " to elevate extremities.     The patient was discussed with Dr. Jarquin and he is in agreement with the above note.

## 2024-08-30 ENCOUNTER — TRANSITIONAL CARE MANAGEMENT (OUTPATIENT)
Dept: FAMILY MEDICINE CLINIC | Facility: HOSPITAL | Age: 54
End: 2024-08-30

## 2024-08-30 VITALS
OXYGEN SATURATION: 94 % | DIASTOLIC BLOOD PRESSURE: 63 MMHG | BODY MASS INDEX: 50.02 KG/M2 | TEMPERATURE: 97.5 F | HEIGHT: 64 IN | HEART RATE: 81 BPM | RESPIRATION RATE: 16 BRPM | WEIGHT: 293 LBS | SYSTOLIC BLOOD PRESSURE: 134 MMHG

## 2024-08-30 PROCEDURE — 99238 HOSP IP/OBS DSCHRG MGMT 30/<: CPT

## 2024-08-30 RX ADMIN — CARIPRAZINE 3 MG: 3 CAPSULE, GELATIN COATED ORAL at 08:10

## 2024-08-30 RX ADMIN — PANTOPRAZOLE SODIUM 20 MG: 20 TABLET, DELAYED RELEASE ORAL at 05:23

## 2024-08-30 RX ADMIN — FUROSEMIDE 20 MG: 20 TABLET ORAL at 08:10

## 2024-08-30 RX ADMIN — MECLIZINE HYDROCHLORIDE 25 MG: 12.5 TABLET ORAL at 08:10

## 2024-08-30 RX ADMIN — GABAPENTIN 300 MG: 300 CAPSULE ORAL at 08:10

## 2024-08-30 RX ADMIN — LISINOPRIL 20 MG: 20 TABLET ORAL at 08:10

## 2024-08-30 NOTE — SOCIAL WORK
CM met with pt to review d/c plan and f/u supports. IMM explained to pt who expressed verbal confirmation of understanding and in agreement. Pt declined the right to appeal d/c at this time. Pt signed IMM and verbalized readiness to d/c home tomorrow.  Pt appeared in good spirits. She was coloring on unit. Pt denies all with some anx about d/c however expresses d/c readiness.

## 2024-08-30 NOTE — NURSING NOTE
"Patient has been visible in the milieu this morning. She is bright and pleasant. Social with staff and peers. Attends Am groups. She is compliant with her scheduled medications. Her appetite Is good. She endorses mild anxiety in regards to her discharge this morning but reports being \"excited\". Denies depression. Denies Si/Hi and hallucinations. She has been up and ambulatory without difficulty. She is able to make her needs known and offers no current complaints/ concerns. Plan of care continues. Q7 minute safety checks in progress.   "

## 2024-08-30 NOTE — PLAN OF CARE
Problem: Depression  Goal: Refrain from harming self  Description: Interventions:  - Monitor patient closely, per order   - Supervise medication ingestion, monitor effects and side effects   Outcome: Progressing     Problem: Anxiety  Goal: Anxiety is at manageable level  Description: Interventions:  - Assess and monitor patient's anxiety level.   - Monitor for signs and symptoms (heart palpitations, chest pain, shortness of breath, headaches, nausea, feeling jumpy, restlessness, irritable, apprehensive).   - Collaborate with interdisciplinary team and initiate plan and interventions as ordered.  - [place] patient to unit/surroundings  - Explain treatment plan  - Encourage participation in care  - Encourage verbalization of concerns/fears  - Identify coping mechanisms  - Assist in developing anxiety-reducing skills  - Administer/offer alternative therapies  - Limit or eliminate stimulants  Outcome: Progressing

## 2024-08-30 NOTE — NURSING NOTE
Pt ambulated from unit accompanied by staff. AVS reviewed w/ pt and she verbalizes understanding. All belongings sent w/ pt.

## 2024-08-30 NOTE — NURSING NOTE
Patient reporting high anxiety this evening. She is discharging tomorrow and states she is unsure why she is feeling anxious as she is looking forward to discharge. She denies depression, suicidal thoughts, and thoughts of self harm. Has not had any self-injurious behaviors. She has no pain. Compliant with medications.

## 2024-08-30 NOTE — PROGRESS NOTES
"Progress Note - Leydi Khan 54 y.o. female MRN: [id number]    Unit/Bed#: -02 Encounter: [id number]        Subjective:   Patient seen and examined at bedside after reviewing the chart and discussing the case with the caring staff.      Patient examined at bedside.  Patient denies any acute symptoms.      Patient is being discharged today Friday 8/30/2024.      Physical Exam   Vitals: Blood pressure 134/63, pulse 81, temperature 97.5 °F (36.4 °C), temperature source Temporal, resp. rate 16, height 5' 4\" (1.626 m), weight (!) 136 kg (300 lb 6.4 oz), SpO2 94%.,Body mass index is 51.56 kg/m².  Constitutional: Patient in no acute distress.  HEENT: PERR, EOMI, MMM.  Cardiovascular: Normal rate and regular rhythm.  Pulmonary/Chest: Effort normal and breath sounds normal.  Abdomen: Soft, + BS, NT.  Skin:  +1 pitting edema bilateral lower extremities.  No erythema, warmth, calf tenderness.     Assessment/Plan:  Leydi Khan is a(n) 54 y.o. year old female with MDD.    Medical clearance.  Patient is medically cleared for discharge.  All scripts were sent out for the patient.    Cardiac with hx of HTN, HLD.  Continue lisinopril 20 mg daily, atorvastatin 10 mg daily, Lasix 20 mg daily.  Seizure disorder.  Patient is on Depakote ER 1250 mg at bedtime.  DJD/OA/chronic low back pain.  Tylenol, lidocaine patch daily and Voltaren gel.  GERD.  Patient is on Protonix 20 mg daily.  Prediabetes.  Hgb A1c 5.7% on 6/11/24.  Lifestyle modifications.  Neuropathy.  Continue gabapentin to 300 mg TID.  Vitamin D deficiency.  Patient started on vitamin D2 31669 units weekly x 8 weeks followed by vitamin D3 1000 units daily.  Vitamin B12 deficiency.  Not on supplement.  Intertrigo.  Involving undersurface of right breast, groin, sacrum.  Apply nystatin powder twice daily as needed.  Vertigo.  Continue meclizine 25 mg daily.  Constipation.  Patient started on Senokot-S twice daily.  MiraLAX and lactulose as needed.  Bilateral leg swelling.  " On Lasix 20 mg daily.  Extra 20 mg x1 dose given 8/28.  Low sodium diet.  Encouraged to elevate extremities.     The patient was discussed with Dr. Jarquin and he is in agreement with the above note.

## 2024-08-30 NOTE — PROGRESS NOTES
08/30/24     Team Meeting   Meeting Type Daily Rounds   Team Members Present   Team Members Present Physician;Occupational Therapist;Nurse;   Physician Team Member Dr Adriano Hinton MD   Nursing Team Member Newton VALDEZ   Social Work Team Member Justice DE LA TORRE   OT Team Member Murphy DINERO   Patient/Family Present   Patient Present No   Patient's Family Present No   D/c today home to [facility], some anx about d/c, denies si/hi/dep/avh

## 2024-09-04 ENCOUNTER — TELEPHONE (OUTPATIENT)
Age: 54
End: 2024-09-04

## 2024-09-04 NOTE — TELEPHONE ENCOUNTER
Received call from facility regarding patient. They wanted provider to be aware that patients edema is getting worse again. States that this has happened before, and compression stockings were ordered however they did not fit. They said she would need probably the largest size. She is currently on 20mg lasix. Unsure if that should be adjusted. Patient has a TCM visit on Friday. Please advise.

## 2024-09-30 ENCOUNTER — DOCUMENTATION (OUTPATIENT)
Dept: CASE MANAGEMENT | Facility: HOSPITAL | Age: 54
End: 2024-09-30

## 2024-09-30 NOTE — BEHAVIORAL HEALTH HIGH UTILIZER
Patient Name:Leydi Khan MRN:  [id number]         : 1970     Age: 54 y.o.    Sex: female   Utilization History:  (# of ED visits & IP admits; reasons)  Pt had [facility] visits from 2024-2024. ED presentations were related to SI's with no plan or a plan to cut her wrists or throat, jump or walk in front of a car or traffic, hang or smother herself, bang her head or drink bleach.  Presentations were also related to AH, feelings that no one loves or cares about her, boyfriend break-ups, complaints about her group home and needs not being met, self-injurious superficial scratches or scrapes with pens/forks/paperclips/thumbtack/stapler, banging head and calling 911 on herself from her group home setting, wants someone to talk to in ED.     Medical presentations were related to back pain, chest pain, pain from falling, generalized body pain, UTI symptoms, bilateral leg swelling, dizziness.     Pt had multiple 30-day behavioral health readmissions.    Treatment Recommendations & Presentation:  Presentation in the ED:  Pt will typically call 911 for EMS/police to bring her to ED's or is accompanied by group home staff. ED visits were related to SI's with no plan or a plan to cut her wrists or throat, jump or walk in front of a car or traffic, hang or smother herself, bang her head or drink bleach. Visits were also related to AH, feelings that no one loves or cares about her, boyfriend break-ups, complaints about her group home and needs not being met, self-injurious superficial scratches or scrapes with pens/forks/paperclips/stapler, banging head and calling 911 on herself from her group home setting, wants someone to talk to in the ED.  Medical visits were related to back pain, chest pain, pain from falling, generalized body pain, UTI symptoms, bilateral leg swelling, dizziness.  Pt has superficially scratched or poked herself with a thumbtack on multiple occasions and has also utilized a plastic fork to  scratch herself. Pt also states she does not like living at her group [facility] and [facility]   Pt will call 911 on herself in order for EMS to bring her to ED's and tends to do this at certain times when group home staffing is more limited. If pt's boyfriend is being hospitalized then pt also wants hospitalization. Pt may seek hospitalization because she spent her allowance and is frustrated.             ED Recommendations: Following medical evaluation and treatment, allow pt cool down time for de-escalation and for provider to establish acute risk versus pt's chronic behavioral disturbances. Psychiatric consults should be by a [facility] psychiatric provider. Pt states she comes to the ED to have someone to talk to.  Contact [place] to develop a safe discharge plan back to [facility] [phone]. Specific contacts are: Letitia Lucia - Supervisor, Care Coordinator [phone]. [phone] or Fabio - Care Coordinator [phone] or Supervisor BRITT Betts - [phone] ext. [phone], or Therapist Dana from [facility] at [phone].        Home Medication Regimen: See most recent documentation in Epic, can verify medication with staff or nurse from [facility] [phone].          Recent Medical Work Ups:  (include Psych testing or ECT)      EKG's - 2023, 2024   Labs - 2023, 2024 Inpatient Recommendations: Collaborate with pt's community sources to develop a comprehensive discharge plan.           Outpatient recommendations:  Pt is to continue with treatment at [facility]. [facility] staff, care coordinators, and therapist should develop a crisis plan with pt and healthy coping skills in response to her distressful feelings.          Situation/Relevant Background Info: Pt is a 54 year old female with Cognitive Impairment, Bipolar I Disorder, and Borderline Personality Disorder along with an extensive  history of behavioral health inpatient admissions.  Pt resides in a group home setting at [facility]. Pt also has Care Coordinators and receives therapy and psychiatric care through the [facility].   Pt appears to derive secondary gains from ED visits/encounters as well inpatient behavioral health (BH) admissions; therefore, group home staff has advised hospital professionals that pt should have brief behavioral health stays/hospitalizations.   Pt states that she has a boyfriend who is a support system for her; staff confirms that pt has a boyfriend who also lives at [facility] and she will often mirror his issues - pt attempts to be hospitalized when her boyfriend is being hospitalized. Pt's romantic interests may change to other individuals at her group home.  Upon  admission, Pt often states that she does not want to be in her group home and does not want to be discharged back to her group home. Pt has a possibility of another living situation as per TIP coordinator but pt has to be stable in the community.  Pt was moved from [facility] to [facility] in 2023 and is still getting used to her new staff and housing situation. Therapist Dana states pt's most recent BH admit 11/2023 was due to pt spending her allowance on take out food and then being very upset that money was gone. They intend to work on her budgeting issues.                              Diagnoses/Significant Problems (Medical & Psychiatric):  Psychiatric:  Bipolar 1 Disorder, MDD, PTSD, Borderline Personality Disorder     Medical: Hyponatremia (HCC), Seizures (HCC), Obesity, Hyperlipidemia, HTN, Cognitive Impairment                 Drivers of repeated utilization:   Secondary gains from ED visits and inpatient BH stays, impulsivity, limited coping skills, attention seeking behaviors, wanting to be in hospital if her boyfriend is hospitalized, personality disordered  behaviors, unhappy with group home.                                                  Existing Community Resources & Supports:                 Aunt Sandhya Collins - [phone]    Patient Medical & Psychiatric Care Team:  [facility] [place] [phone] or [phone]  [facility] [phone]  Therapist Dana from [facility] - [phone]     [facility] - [phone]   Endocrinology, [place] - [phone]  [facility] - [phone] ext. [phone], supervisor     Care plan date: 09/30/24                   Author:  Brianna Coleman RN                 Date reviewed with patient:

## 2024-12-09 ENCOUNTER — DOCUMENTATION (OUTPATIENT)
Dept: CASE MANAGEMENT | Facility: HOSPITAL | Age: 54
End: 2024-12-09

## 2024-12-09 NOTE — BEHAVIORAL HEALTH HIGH UTILIZER
Patient Name:Leydi Khan MRN:  [id number]         : 1970     Age: 54 y.o.    Sex: female   Utilization History:  (# of ED visits & IP admits; reasons)  Pt had [facility] visits from 2024-2024. ED presentations were related to SI's with no plan or a plan to cut her wrists or throat, jump or walk in front of a car or traffic, hang or smother herself, bang her head or drink bleach.  Presentations were also related to AH, feelings that no one loves or cares about her, boyfriend break-ups, complaints about her group home and needs not being met, self-injurious superficial scratches or scrapes with pens/forks/paperclips/thumbtack/stapler, banging head and calling 911 on herself from her group home setting, wants someone to talk to in ED.     Medical presentations were related to back pain, chest pain, pain from falling, generalized body pain, UTI symptoms, bilateral leg swelling, dizziness.     Pt had multiple 30-day behavioral health readmissions.    Treatment Recommendations & Presentation:  Presentation in the ED:  Pt will typically call 911 for EMS/police to bring her to ED's or is accompanied by group home staff. ED visits were related to SI's with no plan or a plan to cut her wrists or throat, jump or walk in front of a car or traffic, hang or smother herself, bang her head or drink bleach. Visits were also related to AH, feelings that no one loves or cares about her, boyfriend break-ups, complaints about her group home and needs not being met, self-injurious superficial scratches or scrapes with pens/forks/paperclips/stapler, banging head and calling 911 on herself from her group home setting, wants someone to talk to in the ED.  Medical visits were related to back pain, chest pain, pain from falling, generalized body pain, UTI symptoms, bilateral leg swelling, dizziness.  Pt has superficially scratched or poked herself with a thumbtack on multiple occasions and has also utilized a plastic fork to  scratch herself. Pt also states she does not like living at her group home and [facility]   Pt will call 911 on herself in order for EMS to bring her to ED's and tends to do this at certain times when group home staffing is more limited. If pt's boyfriend is being hospitalized then pt also wants hospitalization. Pt may seek hospitalization because she spent her allowance and is frustrated.          ED Recommendations: Following medical evaluation and treatment, allow pt cool down time for de-escalation and for provider to establish acute risk versus pt's chronic behavioral disturbances. Psychiatric consults should be by a [facility] psychiatric provider. Pt states she comes to the ED to have someone to talk to.  Contact [place] to develop a safe discharge plan back to [facility] [phone]. Specific contacts are: Letitia Lucia - Supervisor, Care Coordinator [phone]. [phone] or Fabio - Care Coordinator [phone] or Supervisor BRITT Betts - [phone]. [phone], or Therapist Dana from [facility] at [phone].           Home Medication Regimen:  See most recent documentation in Epic, can verify medication with staff or nurse from [facility] [phone].          Recent Medical Work Ups:  (include Psych testing or ECT)     EKG's - 2023, 2024   Labs - 2023, 2024 Inpatient Recommendations: Collaborate with pt's community sources to develop a comprehensive discharge plan.           Outpatient recommendations:  Pt is to continue with treatment at [facility]. [facility] staff, care coordinators, and therapist should develop a crisis plan with pt and healthy coping skills in response to her distressful feelings.          Situation/Relevant Background Info: Pt is a 54 year old female with Cognitive Impairment, Bipolar I Disorder, and Borderline Personality Disorder along with an extensive  history of behavioral health inpatient admissions.  Pt resides in a group home setting at [facility]. Pt also has Care Coordinators and receives therapy and psychiatric care through the [facility].   Pt appears to derive secondary gains from ED visits/encounters as well inpatient behavioral health (BH) admissions; therefore, group home staff has advised hospital professionals that pt should have brief behavioral health stays/hospitalizations.   Pt states that she has a boyfriend who is a support system for her; staff confirms that pt has a boyfriend who also lives at [facility] and she will often mirror his issues - pt attempts to be hospitalized when her boyfriend is being hospitalized. Pt's romantic interests may change to other individuals at her group home.  Upon  admission, Pt often states that she does not want to be in her group home and does not want to be discharged back to her group home. Pt has a possibility of another living situation as per TIP coordinator but pt has to be stable in the community.  Pt was [facility] in [facility]23 and is still getting used to her new staff and housing situation. Therapist Dana states pt's most recent BH admit 11/2023 was due to pt spending her allowance on take out food and then being very upset that money was gone. They intend to work on her budgeting issues.                                    Diagnoses/Significant Problems (Medical & Psychiatric):    Psychiatric:  Bipolar 1 Disorder, MDD, PTSD, Borderline Personality Disorder     Medical: Hyponatremia (HCC), Seizures (HCC), Obesity, Hyperlipidemia, HTN, Cognitive Impairment                  Drivers of repeated utilization:  Secondary gains from ED visits and inpatient BH stays, impulsivity, limited coping skills, attention seeking behaviors, wanting to be in hospital if her boyfriend is hospitalized, personality disordered  behaviors, unhappy with group home.                                                      Existing Community Resources & Supports:                 Aunt Sandhya Collins - [phone]     Patient Medical & Psychiatric Care Team:  [facility] [place] [phone] or [phone]  [facility] [phone]  Therapist Dana from [facility] - [phone]     [facility] - [phone]   Endocrinology, [place] - [phone]  [facility] - [phone] ext. [phone], supervisor     Care plan date: 12/09/24                   Author:  Brianna Coleman RN                 Date reviewed with patient:

## 2024-12-09 NOTE — BEHAVIORAL HEALTH HIGH UTILIZER
Patient Name:Leydi Khan MRN:  [id number]         : 1970     Age: 54 y.o.    Sex: female   Utilization History:  (# of ED visits & IP admits; reasons)  Pt had [facility] visits from 2024-2024. ED presentations were related to SI's with no plan or a plan to cut her wrists or throat, jump or walk in front of a car or traffic, hang or smother herself, bang her head or drink bleach.  Presentations were also related to AH, feelings that no one loves or cares about her, boyfriend break-ups, complaints about her group home and needs not being met, self-injurious superficial scratches or scrapes with pens/forks/paperclips/thumbtack/stapler, banging head and calling 911 on herself from her group home setting, wants someone to talk to in ED.     Medical presentations were related to back pain, chest pain, pain from falling, generalized body pain, UTI symptoms, bilateral leg swelling, dizziness.     Pt had multiple 30-day behavioral health readmissions.   Treatment Recommendations & Presentation:  Presentation in the ED: Pt will typically call 911 for EMS/police to bring her to ED's or is accompanied by group home staff. ED visits were related to SI's with no plan or a plan to cut her wrists or throat, jump or walk in front of a car or traffic, hang or smother herself, bang her head or drink bleach. Visits were also related to AH, feelings that no one loves or cares about her, boyfriend break-ups, complaints about her group home and needs not being met, self-injurious superficial scratches or scrapes with pens/forks/paperclips/stapler, banging head and calling 911 on herself from her group home setting, wants someone to talk to in the ED.  Medical visits were related to back pain, chest pain, pain from falling, generalized body pain, UTI symptoms, bilateral leg swelling, dizziness.  Pt has superficially scratched or poked herself with a thumbtack on multiple occasions and has also utilized a plastic fork to scratch  herself. Pt also states she does not like living at her group home and [facility]   Pt will call 911 on herself in order for EMS to bring her to ED's and tends to do this at certain times when group home staffing is more limited. If pt's boyfriend is being hospitalized then pt also wants hospitalization. Pt may seek hospitalization because she spent her allowance and is frustrated.           ED Recommendations: Following medical evaluation and treatment, allow pt cool down time for de-escalation and for provider to establish acute risk versus pt's chronic behavioral disturbances. Psychiatric consults should be by a [facility] psychiatric provider. Pt states she comes to the ED to have someone to talk to.  Contact [place] to develop a safe discharge plan back [facility] [phone]. Specific contacts are: Letitia Lucia - Supervisor, Care Coordinator [phone] or Fabio - Care Coordinator [phone] or Supervisor BRITT Betts - [phone]. [phone], or Therapist Dana from [facility] at [phone].            Home Medication Regimen:  See most recent documentation in Epic, can verify medication with staff or nurse from [facility] [phone].     Recent Medical Work Ups:  (include Psych testing or ECT)     EKG's - 2023, 2024   Labs - 2023, 2024   Inpatient Recommendations:   Collaborate with pt's community sources to develop a comprehensive discharge plan.       Outpatient recommendations:   Pt is to continue with treatment at [facility]. [facility] staff, care coordinators, and therapist should develop a crisis plan with pt and healthy coping skills in response to her distressful feelings.        Situation/Relevant Background Info:      Pt is a 54 year old female with Cognitive Impairment, Bipolar I Disorder, and Borderline Personality Disorder along with an extensive history  of behavioral health inpatient admissions.  Pt resides in a group home setting at [facility]. Pt also has Care Coordinators and receives therapy and psychiatric care through the [facility].   Pt appears to derive secondary gains from ED visits/encounters as well inpatient behavioral health (BH) admissions; therefore, group home staff has advised hospital professionals that pt should have brief behavioral health stays/hospitalizations.   Pt states that she has a boyfriend who is a support system for her; staff confirms that pt has a boyfriend who also lives at [facility] and she will often mirror his issues - pt attempts to be hospitalized when her boyfriend is being hospitalized. Pt's romantic interests may change to other individuals at her group home.  Upon  admission, Pt often states that she does not want to be in her group home and does not want to be discharged back to her group home. Pt has a possibility of another living situation as per TIP coordinator but pt has to be stable in the community.  Pt was [facility] from [facility] to [facility] in 2023 and is still getting used to her new staff and housing situation. Therapist Dana states pt's most recent BH admit 11/2023 was due to pt spending her allowance on take out food and then being very upset that money was gone. They intend to work on her budgeting issues.        Diagnoses/Significant Problems (Medical & Psychiatric):    Psychiatric:  Bipolar 1 Disorder, MDD, PTSD, Borderline Personality Disorder     Medical: Hyponatremia (HCC), Seizures (HCC), Obesity, Hyperlipidemia, HTN, Cognitive Impairment             Drivers of repeated utilization:      Secondary gains from ED visits and inpatient BH stays, impulsivity, limited coping skills, attention seeking behaviors, wanting to be in hospital if her boyfriend is hospitalized, personality disordered behaviors, unhappy with group home.                                                   Existing Community Resources & Supports:                 Aunt Sandhya Collins - [phone]     Patient Medical & Psychiatric Care Team:     [facility] [place] [phone] or [phone]  [facility] [phone]  Therapist Dana from [facility] - [phone]     [facility] - [phone]   Endocrinology, [place] - [phone]  [facility] - [phone] ext. [phone], supervisor       Care plan date: 12/09/24                   Author:  Alba Azul, RN                 Date reviewed with patient:

## 2024-12-12 NOTE — NURSING NOTE
Female Patient is visible in milieu with peers for meals, then withdrawn to room, medication compliant and cooperative. Patient has high anxiety and high depression, passive SI, denies HI and hallucinations. Continued care and continual safety checks in progress.

## 2025-01-23 NOTE — NURSING NOTE
Patient appeared asleep without discomfort throughout the night  Will continue to monitor for safety till shift change  declines

## 2025-01-28 ENCOUNTER — DOCUMENTATION (OUTPATIENT)
Dept: CASE MANAGEMENT | Facility: HOSPITAL | Age: 55
End: 2025-01-28

## 2025-01-28 NOTE — BEHAVIORAL HEALTH HIGH UTILIZER
Patient Name:Leydi Khan MRN:  [id number]         : 1970     Age: 54 y.o.    Sex: female   Utilization History:  (# of ED visits & IP admits; reasons)  Pt had 17 [facility]-ED visits from 2024-2025. ED presentations were related to SI's with no plan or a plan to cut her wrists or throat, jump or walk in front of a car or traffic, hang or smother herself, bang her head or drink bleach.  Presentations were also related to AH, feelings that no one loves or cares about her, boyfriend break-ups, complaints about her group home and needs not being met, self-injurious superficial scratches or scrapes with pens/forks/paperclips/thumbtack/stapler, banging head and calling 911 on herself from her group home setting, wants someone to talk to in ED.     Medical presentations were related to back pain, chest pain, pain from falling, generalized body pain, UTI symptoms, bilateral leg swelling, dizziness.     Pt had multiple 30-day behavioral health readmissions.     Treatment Recommendations & Presentation:  Presentation in the ED: Pt will typically call 911 for EMS/police to bring her to ED's or is accompanied by group home staff. ED visits were related to SI's with no plan or a plan to cut her wrists or throat, jump or walk in front of a car or traffic, hang or smother herself, bang her head or drink bleach. Visits were also related to AH, feelings that no one loves or cares about her, boyfriend break-ups, complaints about her group home and needs not being met, self-injurious superficial scratches or scrapes with pens/forks/paperclips/stapler, banging head and calling 911 on herself from her group home setting, wants someone to talk to in the ED.  Medical visits were related to back pain, chest pain, pain from falling, generalized body pain, UTI symptoms, bilateral leg swelling, dizziness.  Pt has superficially scratched or poked herself with a thumbtack on multiple occasions and has also utilized a plastic fork to  scratch herself. Pt also states she does not like living at her group home and [facility]   Pt will call 911 on herself in order for EMS to bring her to ED's and tends to do this at certain times when group home staffing is more limited. If pt's boyfriend is being hospitalized then pt also wants hospitalization. Pt may seek hospitalization because she spent her allowance and is frustrated.           ED Recommendations: Following medical evaluation and treatment, allow pt cool down time for de-escalation and for provider to establish acute risk versus pt's chronic behavioral disturbances. Psychiatric consults should be by a [facility] psychiatric provider. Pt states she comes to the ED to have someone to talk to.  Contact [facility] to develop a safe discharge plan back to [facility] [phone]. Specific contacts are: Letitia Lucia - Supervisor, Care Coordinator [phone]. [phone] or Fabio - Care Coordinator [phone] or Supervisor BRITT Betts - [phone]. [phone], or Therapist Dana from [facility] at [phone].            Home Medication Regimen:  See most recent documentation in Epic, can verify medication with staff or nurse from [facility] [phone].     Recent Medical Work Ups:  (include Psych testing or ECT)     EKG's - 2023, 2024   Labs - 2023, 2024 Inpatient Recommendations: Collaborate with pt's community sources to develop a comprehensive discharge plan.         Outpatient recommendations: Pt is to continue with treatment at [facility] & [facility]. [facility] staff, care coordinators, and therapist should develop a crisis plan with pt and healthy coping skills in response to her distressful feelings.      Situation/Relevant Background Info:      Pt is a 54 year old female with Cognitive Impairment, Bipolar I Disorder, and Borderline Personality Disorder along with an extensive history  of behavioral health inpatient admissions.  Pt resides in a group home setting at [facility]. Pt also has Care Coordinators and receives therapy and psychiatric care through the [facility].   Pt appears to derive secondary gains from ED visits/encounters as well inpatient behavioral health (BH) admissions; therefore, group home staff has advised hospital professionals that pt should have brief behavioral health stays/hospitalizations.   Pt states that she has a boyfriend who is a support system for her; staff confirms that pt has a boyfriend who also lives at [facility] and she will often mirror his issues - pt attempts to be hospitalized when her boyfriend is being hospitalized. Pt's romantic interests may change to other individuals at her group home.  Upon  admission, Pt often states that she does not want to be in her group home and does not want to be discharged back to her group home. Pt has a possibility of another living situation as per TIP coordinator but pt has to be stable in the community.  Pt was moved from [facility] [place] to [facility] in 2023 and is still getting used to her new staff and housing situation. Therapist Dana states pt's most recent BH admit 11/2023 was due to pt spending her allowance on take out food and then being very upset that money was gone. They intend to work on her budgeting issues.        Diagnoses/Significant Problems (Medical & Psychiatric):  Psychiatric:  Bipolar 1 Disorder, MDD, PTSD, Borderline Personality Disorder     Medical: Hyponatremia (HCC), Seizures (HCC), Obesity, Hyperlipidemia, HTN, Cognitive Impairment                Drivers of repeated utilization:     Secondary gains from ED visits and inpatient BH stays, impulsivity, limited coping skills, attention seeking behaviors, wanting to be in hospital if her boyfriend is hospitalized, personality disordered behaviors, unhappy with group home.                                              Existing Community Resources & Supports:        Aunsoila Collins - [phone]                Patient Medical & Psychiatric Care Team:  [facility] [place] [phone] or [phone]- unable to confirm, call went to  01/2025  [facility] [place] [phone]  Therapist Dana from [facility] - [phone]    [facility] - [phone]- last appointment 08/2024   Endocrinology, [place] - [phone]- patient has canceled/ no showed to multiple appointments  Ivan Betts - [phone] ext. [phone], supervisor     Care plan date: 01/28/25                   Author:  Alba Azul, RN                 Date reviewed with patient:

## 2025-04-04 ENCOUNTER — DOCUMENTATION (OUTPATIENT)
Dept: CASE MANAGEMENT | Facility: HOSPITAL | Age: 55
End: 2025-04-04

## 2025-04-04 NOTE — BEHAVIORAL HEALTH HIGH UTILIZER
Patient Name:Leydi Khan MRN:  [id number]         : 1970     Age: 55 y.o.    Sex: female   Utilization History:  (# of ED visits & IP admits; reasons)  t had 17 [facility]-ED visits from 2024-2025. ED presentations were related to SI's with no plan or a plan to cut her wrists or throat, jump or walk in front of a car or traffic, hang or smother herself, bang her head or drink bleach.  Presentations were also related to AH, feelings that no one loves or cares about her, boyfriend break-ups, complaints about her group home and needs not being met, self-injurious superficial scratches or scrapes with pens/forks/paperclips/thumbtack/stapler, banging head and calling 911 on herself from her group home setting, wants someone to talk to in ED.     Medical presentations were related to back pain, chest pain, pain from falling, generalized body pain, UTI symptoms, bilateral leg swelling, dizziness.     Pt had multiple 30-day behavioral health readmissions.       Treatment Recommendations & Presentation:  Presentation in the ED: Pt will typically call 911 for EMS/police to bring her to ED's or is accompanied by group home staff. ED visits were related to SI's with no plan or a plan to cut her wrists or throat, jump or walk in front of a car or traffic, hang or smother herself, bang her head or drink bleach. Visits were also related to AH, feelings that no one loves or cares about her, boyfriend break-ups, complaints about her group home and needs not being met, self-injurious superficial scratches or scrapes with pens/forks/paperclips/stapler, banging head and calling 911 on herself from her group home setting, wants someone to talk to in the ED.  Medical visits were related to back pain, chest pain, pain from falling, generalized body pain, UTI symptoms, bilateral leg swelling, dizziness.  Pt has superficially scratched or poked herself with a thumbtack on multiple occasions and has also utilized a plastic fork to  scratch herself. Pt also states she does not like living at her group home and does not want to be discharged from inpt  stays.   Pt will call 911 on herself in order for EMS to bring her to ED's and tends to do this at certain times when group home staffing is more limited. If pt's boyfriend is being hospitalized then pt also wants hospitalization. Pt may seek hospitalization because she spent her allowance and is frustrated.             ED Recommendations:  Following medical evaluation and treatment, establish acute risk versus pt's chronic behavioral disturbances. Psychiatric consults should be by a [facility] psychiatric provider. Pt states she comes to the ED to have someone to talk to.  Contact [place] to develop a safe discharge plan back to [facility] [phone]. Specific contacts are: Letitia Lucia - Supervisor, Care Coordinator [phone] or Fabio - Care Coordinator [phone] or Supervisor BRITT Betts - [phone] ext. [phone], or Therapist Dana from [facility] at [phone].     Home Medication Regimen:See most recent documentation in Epic, can verify medication with staff or nurse from [facility] [phone].        Recent Medical Work Ups:  (include Psych testing or ECT)     Labs-2024  More recent work-ups from outpt providers Inpatient Recommendations:  Collaborate with pt's community sources to develop a comprehensive discharge plan         Outpatient recommendations:  Pt is to continue with treatment at [facility]. [facility] staff, care coordinators, and therapist should develop a crisis plan with pt and healthy coping skills in response to her distressful feelings.         Situation/Relevant Background Info:   Pt is a 55 year old female with Cognitive Impairment, Bipolar I Disorder, and Borderline Personality Disorder along with an extensive history of behavioral health inpatient admissions.  Pt  resides in a group home setting at [facility]. Pt also has Care Coordinators and receives therapy and psychiatric care through the [facility].   Pt appears to derive secondary gains from ED visits/encounters as well inpatient behavioral health (BH) admissions; therefore, group home staff has advised hospital professionals that pt should have brief behavioral health stays/hospitalizations.   Pt states that she has a boyfriend who is a support system for her; staff confirms that pt has a boyfriend who also lives at [facility] and she will often mirror his issues - pt attempts to be hospitalized when her boyfriend is being hospitalized. Pt's romantic interests may change to other individuals at her group home.  Upon BH admission, Pt often states that she does not want to be in her group home and does not want to be discharged back to her group home. Pt has a possibility of another living situation as per TIP coordinator but pt has to be stable in the community.  Pt was moved from [facility] to [facility] in 2023 and is still getting used to her new staff and housing situation. Therapist Dana states pt's most recent BH admit 11/2023 was due to pt spending her allowance on take out food and then being very upset that money was gone. They intend to work on her budgeting issues.            Diagnoses/Significant Problems (Medical & Psychiatric):      Psychiatric:  Bipolar 1 Disorder, MDD, PTSD, Borderline Personality Disorder     Medical: Hyponatremia (HCC), Seizures (HCC), Obesity, Hyperlipidemia, HTN, Cognitive Impairment                Drivers of repeated utilization: Secondary gains from ED visits and inpatient BH stays, impulsivity, limited coping skills, attention seeking behaviors, wanting to be in hospital if her boyfriend is hospitalized, personality disordered behaviors, unhappy with group home.                                                   Existing Community Resources & Supports:                 Aunsoila Collins - [phone]                 Patient Medical & Psychiatric Care Team:  [facility] [place] [phone] or [phone]- unable to confirm, call went to  01/2025  [facility] [place] [phone]  Therapist Dana from [facility] - [phone]    [facility] - [phone]- last appointment 08/2024   Endocrinology, [place] - [phone]- patient has canceled/ no showed to multiple appointments  Ivan Betts - [phone] ext. [phone], supervisor     Care plan date: 04/04/25                   Author:  Brianna Coleman RN                 Date reviewed with patient: